# Patient Record
Sex: FEMALE | Race: BLACK OR AFRICAN AMERICAN | NOT HISPANIC OR LATINO | Employment: OTHER | URBAN - METROPOLITAN AREA
[De-identification: names, ages, dates, MRNs, and addresses within clinical notes are randomized per-mention and may not be internally consistent; named-entity substitution may affect disease eponyms.]

---

## 2017-01-09 ENCOUNTER — HOSPITAL ENCOUNTER (EMERGENCY)
Facility: HOSPITAL | Age: 47
Discharge: HOME | End: 2017-01-09
Attending: EMERGENCY MEDICINE
Payer: MEDICARE

## 2017-01-09 VITALS
BODY MASS INDEX: 50.02 KG/M2 | HEIGHT: 64 IN | WEIGHT: 293 LBS | DIASTOLIC BLOOD PRESSURE: 89 MMHG | TEMPERATURE: 98 F | SYSTOLIC BLOOD PRESSURE: 165 MMHG | HEART RATE: 60 BPM | RESPIRATION RATE: 20 BRPM | OXYGEN SATURATION: 93 %

## 2017-01-09 DIAGNOSIS — R51.9 CHRONIC NONINTRACTABLE HEADACHE, UNSPECIFIED HEADACHE TYPE: Primary | ICD-10-CM

## 2017-01-09 DIAGNOSIS — G89.29 CHRONIC NONINTRACTABLE HEADACHE, UNSPECIFIED HEADACHE TYPE: Primary | ICD-10-CM

## 2017-01-09 LAB
ALBUMIN SERPL BCP-MCNC: 3.3 G/DL
ALP SERPL-CCNC: 74 U/L
ALT SERPL W/O P-5'-P-CCNC: 15 U/L
AMORPH CRY URNS QL MICRO: NORMAL
AMPHET+METHAMPHET UR QL: NEGATIVE
ANION GAP SERPL CALC-SCNC: 9 MMOL/L
AST SERPL-CCNC: 12 U/L
B-HCG UR QL: NEGATIVE
BACTERIA #/AREA URNS HPF: NORMAL /HPF
BARBITURATES UR QL SCN>200 NG/ML: NORMAL
BASOPHILS # BLD AUTO: 0.04 K/UL
BASOPHILS NFR BLD: 0.9 %
BENZODIAZ UR QL SCN>200 NG/ML: NEGATIVE
BILIRUB SERPL-MCNC: 0.3 MG/DL
BILIRUB UR QL STRIP: NEGATIVE
BUN SERPL-MCNC: 11 MG/DL
BZE UR QL SCN: NEGATIVE
CALCIUM SERPL-MCNC: 8.8 MG/DL
CANNABINOIDS UR QL SCN: NEGATIVE
CHLORIDE SERPL-SCNC: 110 MMOL/L
CLARITY UR: CLEAR
CO2 SERPL-SCNC: 23 MMOL/L
COLOR UR: YELLOW
CREAT SERPL-MCNC: 1.1 MG/DL
CREAT UR-MCNC: 84.1 MG/DL
CTP QC/QA: YES
DIFFERENTIAL METHOD: ABNORMAL
EOSINOPHIL # BLD AUTO: 0.3 K/UL
EOSINOPHIL NFR BLD: 6.1 %
ERYTHROCYTE [DISTWIDTH] IN BLOOD BY AUTOMATED COUNT: 19.4 %
EST. GFR  (AFRICAN AMERICAN): >60 ML/MIN/1.73 M^2
EST. GFR  (NON AFRICAN AMERICAN): >60 ML/MIN/1.73 M^2
GLUCOSE SERPL-MCNC: 110 MG/DL
GLUCOSE UR QL STRIP: NEGATIVE
HCT VFR BLD AUTO: 35.2 %
HGB BLD-MCNC: 10.5 G/DL
HGB UR QL STRIP: ABNORMAL
KETONES UR QL STRIP: NEGATIVE
LEUKOCYTE ESTERASE UR QL STRIP: NEGATIVE
LYMPHOCYTES # BLD AUTO: 1.7 K/UL
LYMPHOCYTES NFR BLD: 36.9 %
MCH RBC QN AUTO: 23.2 PG
MCHC RBC AUTO-ENTMCNC: 29.8 %
MCV RBC AUTO: 78 FL
METHADONE UR QL SCN>300 NG/ML: NEGATIVE
MICROSCOPIC COMMENT: NORMAL
MONOCYTES # BLD AUTO: 0.6 K/UL
MONOCYTES NFR BLD: 12.2 %
NEUTROPHILS # BLD AUTO: 2 K/UL
NEUTROPHILS NFR BLD: 43.9 %
NITRITE UR QL STRIP: NEGATIVE
OPIATES UR QL SCN: NEGATIVE
PCP UR QL SCN>25 NG/ML: NEGATIVE
PH UR STRIP: 6 [PH] (ref 5–8)
PLATELET # BLD AUTO: 371 K/UL
PMV BLD AUTO: 8.9 FL
POTASSIUM SERPL-SCNC: 3.8 MMOL/L
PROT SERPL-MCNC: 7 G/DL
PROT UR QL STRIP: NEGATIVE
RBC # BLD AUTO: 4.53 M/UL
RBC #/AREA URNS HPF: 0 /HPF (ref 0–4)
SODIUM SERPL-SCNC: 142 MMOL/L
SP GR UR STRIP: 1.01 (ref 1–1.03)
SQUAMOUS #/AREA URNS HPF: 80 /HPF
TOXICOLOGY INFORMATION: NORMAL
URN SPEC COLLECT METH UR: ABNORMAL
UROBILINOGEN UR STRIP-ACNC: NEGATIVE EU/DL
WBC # BLD AUTO: 4.58 K/UL

## 2017-01-09 PROCEDURE — 80053 COMPREHEN METABOLIC PANEL: CPT

## 2017-01-09 PROCEDURE — 96361 HYDRATE IV INFUSION ADD-ON: CPT

## 2017-01-09 PROCEDURE — 96375 TX/PRO/DX INJ NEW DRUG ADDON: CPT

## 2017-01-09 PROCEDURE — 85025 COMPLETE CBC W/AUTO DIFF WBC: CPT

## 2017-01-09 PROCEDURE — 25000003 PHARM REV CODE 250: Performed by: EMERGENCY MEDICINE

## 2017-01-09 PROCEDURE — 82570 ASSAY OF URINE CREATININE: CPT

## 2017-01-09 PROCEDURE — 81025 URINE PREGNANCY TEST: CPT | Performed by: EMERGENCY MEDICINE

## 2017-01-09 PROCEDURE — 63600175 PHARM REV CODE 636 W HCPCS: Performed by: EMERGENCY MEDICINE

## 2017-01-09 PROCEDURE — 96365 THER/PROPH/DIAG IV INF INIT: CPT

## 2017-01-09 PROCEDURE — 81000 URINALYSIS NONAUTO W/SCOPE: CPT

## 2017-01-09 PROCEDURE — 99284 EMERGENCY DEPT VISIT MOD MDM: CPT | Mod: 25

## 2017-01-09 RX ORDER — PROMETHAZINE HYDROCHLORIDE 25 MG/1
25 TABLET ORAL EVERY 6 HOURS PRN
Qty: 15 TABLET | Refills: 0 | Status: SHIPPED | OUTPATIENT
Start: 2017-01-09 | End: 2017-01-11

## 2017-01-09 RX ORDER — HYDROMORPHONE HYDROCHLORIDE 2 MG/ML
1 INJECTION, SOLUTION INTRAMUSCULAR; INTRAVENOUS; SUBCUTANEOUS
Status: COMPLETED | OUTPATIENT
Start: 2017-01-09 | End: 2017-01-09

## 2017-01-09 RX ORDER — PRIMIDONE 50 MG/1
TABLET ORAL
Qty: 90 TABLET | Refills: 0 | Status: SHIPPED | OUTPATIENT
Start: 2017-01-09 | End: 2017-01-17 | Stop reason: SDUPTHER

## 2017-01-09 RX ADMIN — PROMETHAZINE HYDROCHLORIDE 12.5 MG: 25 INJECTION INTRAMUSCULAR; INTRAVENOUS at 10:01

## 2017-01-09 RX ADMIN — HYDROMORPHONE HYDROCHLORIDE 1 MG: 2 INJECTION, SOLUTION INTRAMUSCULAR; INTRAVENOUS; SUBCUTANEOUS at 10:01

## 2017-01-09 RX ADMIN — SODIUM CHLORIDE 1000 ML: 0.9 INJECTION, SOLUTION INTRAVENOUS at 10:01

## 2017-01-09 NOTE — ED AVS SNAPSHOT
[facility]  [address] [place] [address]               Yanni Kaiser   2017  9:37 AM   ED    Description:  Female : 1970   Department:  [facility]           Your Care was Coordinated By:     Provider Role From To    Basilia Lauren MD Attending Provider 17 0937 --      Reason for Visit     Seizures           Diagnoses this Visit        Comments    Chronic nonintractable headache, unspecified headache type    -  Primary       ED Disposition     ED Disposition Condition Comment    Discharge             To Do List           Follow-up Information     Follow up with Darrion Fuentes MD. Schedule an appointment as soon as possible for a visit in 2 days.    Specialty:  Neurology    Contact information:    [address]  [address]  [place] [address]  [phone]         These Medications        Disp Refills Start End    promethazine (PHENERGAN) 25 MG tablet 15 tablet 0 2017     Take 1 tablet (25 mg total) by mouth every 6 (six) hours as needed for Nausea. - Oral    Pharmacy: [facility] [address] [place], [place] [address] AT [facility] Ph #: [phone]         [facility]     [facility] -  Assistance  Registered nurses in the [facility] provide clinical advisement, health education, appointment booking, and other advisory services.  Call for this free service at [phone].             Medications           Message regarding Medications     Verify the changes and/or additions to your medication regime listed below are the same as discussed with your clinician today.  If any of these changes or additions are incorrect, please notify your healthcare provider.        START taking these NEW medications        Refills    promethazine (PHENERGAN) 25 MG tablet 0    Sig: Take 1 tablet (25 mg total) by mouth every 6 (six) hours as needed for Nausea.    Class:  Print    Route: Oral      These medications were administered today        Dose Freq    hydromorphone (PF) injection 1 mg 1 mg ED 1 Time    Sig: Inject 0.5 mLs (1 mg total) into the vein ED 1 Time.    Class: Normal    Route: Intravenous    promethazine (PHENERGAN) 12.5 mg in dextrose 5 % 50 mL IVPB 12.5 mg ED 1 Time    Sig: Inject 12.5 mg into the vein ED 1 Time.    Class: Normal    Route: Intravenous    sodium chloride 0.9% bolus 1,000 mL 1,000 mL ED 1 Time    Sig: Inject 1,000 mLs into the vein ED 1 Time.    Class: Normal    Route: Intravenous           Verify that the below list of medications is an accurate representation of the medications you are currently taking.  If none reported, the list may be blank. If incorrect, please contact your healthcare provider. Carry this list with you in case of emergency.           Current Medications     LINZESS 290 mcg Cap TK ONE CAPSULE PO D ON AN EMPTY STOMACH    metformin (GLUCOPHAGE-XR) 500 MG 24 hr tablet TAKE 1 TABLET BY MOUTH DAILY WITH BREAKFAST    pantoprazole (PROTONIX) 20 MG tablet TK 1 T PO  D    TAZTIA  mg CpSR TK ONE CAPSULE PO D    topiramate (TOPAMAX) 200 MG Tab Take 1 tablet (200 mg total) by mouth 2 (two) times daily.    acetaZOLAMIDE (DIAMOX) 500 mg CpSR Take 500 mg by mouth 2 (two) times daily.    albuterol (PROVENTIL) 2.5 mg /3 mL (0.083 %) nebulizer solution Take 4 mLs (3.3333 mg total) by nebulization every 4 (four) hours as needed for Wheezing or Shortness of Breath.    atorvastatin (LIPITOR) 20 MG tablet Take 1 tablet (20 mg total) by mouth once daily.    butalbital-acetaminophen-caffeine -40 mg (FIORICET, ESGIC) -40 mg per tablet TK 1 T PO Q 8 H PRF PAIN OR HEADACHES    capsaicin 0.1 % Crea Apply 1 application topically 2 (two) times daily.    EPIPEN 2-RHODA 0.3 mg/0.3 mL (1:1,000) AtIn     fluticasone (FLONASE) 50 mcg/actuation nasal spray 1 spray by Each Nare route daily as needed.    gabapentin (NEURONTIN) 300 MG capsule Take 2  "capsules (600 mg total) by mouth 3 (three) times daily.    lamotrigine (LAMICTAL) 25 MG tablet Take 1 tablet (25 mg total) by mouth 2 (two) times daily.    losartan-hydrochlorothiazide 100-25 mg (HYZAAR) 100-25 mg per tablet Take 1 tablet by mouth once daily.    montelukast (SINGULAIR) 10 mg tablet Take by mouth once daily.     norethindrone (AYGESTIN) 5 mg Tab Take 1 tablet (5 mg total) by mouth once daily. TAKE D 1-10 Q MONTH    nystatin (MYCOSTATIN) powder Apply topically 2 (two) times daily.    nystatin-triamcinolone (MYCOLOG) ointment Apply topically 2 (two) times daily.    oxycodone (ROXICODONE) 15 MG Tab Take 1 tablet (15 mg total) by mouth every 6 (six) hours as needed.    pantoprazole (PROTONIX) 40 MG tablet once daily.     polyethylene glycol (COLYTE) 240-22.72-6.72 -5.84 gram SolR MIX AND DRINK UTD    primidone (MYSOLINE) 50 MG Tab Take 1 tablet (50 mg total) by mouth every evening.    primidone (MYSOLINE) 50 MG Tab TAKE 1 TABLET BY MOUTH EVERY EVENING    promethazine (PHENERGAN) 25 MG tablet Take 1 tablet (25 mg total) by mouth every 6 (six) hours as needed for Nausea.    SYMBICORT 160-4.5 mcg/actuation HFAA 2 puffs every 12 (twelve) hours.     venlafaxine (EFFEXOR-XR) 75 MG 24 hr capsule Take 1 capsule (75 mg total) by mouth every evening. Take half tablet           Clinical Reference Information           Your Vitals Were     BP Pulse Temp Resp Height Weight    143/86 52 97.8 °F (36.6 °C) (Oral) 20 5' 4" (1.626 m) 167.8 kg (370 lb)    Last Period SpO2 BMI          10/13/2016 (Approximate) 96% 63.51 kg/m2        Allergies as of 1/9/2017     No Known Allergies      Immunizations Administered on Date of Encounter - 1/9/2017     None      ED Micro, Lab, POCT     Start Ordered       Status Ordering Provider    01/09/17 0941 01/09/17 0940  POCT urine pregnancy  Once      Final result     01/09/17 0941 01/09/17 0940  Urinalysis  STAT      Final result     01/09/17 0941 01/09/17 0940    STAT,   Status:  " Canceled      Canceled     01/09/17 0940 01/09/17 0940  CBC auto differential  STAT      Final result     01/09/17 0940 01/09/17 0940  Comprehensive metabolic panel  STAT      Final result     01/09/17 0940 01/09/17 0940  Drug screen panel, emergency  Once      Final result     01/09/17 0940 01/09/17 0940  Urinalysis Microscopic  Once      Final result       ED Imaging Orders     None        Discharge Instructions         Take medications as directed.  Follow-up with your neurologist.  Return for any new or worsening complaints.  Self-Care for Headaches  Most headaches aren't serious and can be relieved with self-care. But some headaches may be a sign of another health problem like eye trouble or high blood pressure. To find the best treatment, learn what kind of headaches you get. For tension headaches, self-care will usually help. To treat migraines, ask your healthcare provider for advice. It is also possible to get both tension and migraine headaches. Self-care involves relieving the pain and avoiding headache triggers if you can.    Ways to reduce pain and tension  Try these steps:  · Apply a cold compress or ice pack to the pain site.  · Drink fluids. If nausea makes it hard to drink, try sucking on ice.  · Rest. Protect yourself from bright light and loud noises.  · Calm your emotions by imagining a peaceful scene.  · Massage tight neck, shoulder, and head muscles.  · To relax muscles, soak in a hot bath or use a hot shower.  Use medicines  Aspirin or aspirin substitutes, such as ibuprofen and acetaminophen, can relieve headache. Remember: Never give aspirin to anyone 18 years old or younger because of the risk of developing Reye syndrome. Use pain medicines only when necessary.  Track your headaches  Keeping a headache diary can help you and your healthcare provider identify what's causing your headaches:  · Note when each headache happens.  · Identify your activities and the foods you've eaten 6 to  "8 hours before the headache began.  · Look for any trends or "triggers."  Signs of tension headache  Any of the following can be signs:  · Dull pain or feeling of pressure in a tight band around your head  · Pain in your neck or shoulders  · Headache without a definite beginning or end  · Headache after an activity such as driving or working on a computer  Signs of migraine  Any of the following can be signs:  · Throbbing pain on one or both sides of your head  · Nausea or vomiting  · Extreme sensitivity to light, sound, and smells  · Bright spots, flashes, or other visual changes  · Pain or nausea so severe that you can't continue your daily activities  Call your healthcare provider   If you have any of the following symptoms, contact your healthcare provider:  · A headache that lingers after a recent injury or bump to the head.  · A fever with a stiff neck or pain when you bend your head toward your chest.  · A headache along with slurred speech, changes in your vision, or numbness or weakness in your arms or legs.  · A headache for longer than 3 days.  · Frequent headaches, especially in the morning.  · Headaches with seizures   · Seek immediate medical attention if you have a headache that you would call "the worst headache you have ever had."   © 3019-4289 [employer]. [address] [place] [address], [place] [address]. All rights reserved. This information is not intended as a substitute for professional medical care. Always follow your healthcare professional's instructions.          Your Scheduled Appointments     Mar 09, 2017  1:30 PM CST   Established Patient Visit with Rosalva Taylor DPM   [facility] ([place])    [address] [place], [place] [address] [place] [address] [phone]              [facility] Sign-Up     Activating your [facility] account is as easy as 1-2-3!     1) Visit [email or web address], select Sign Up Now, enter this activation code and your date of birth, then select " Next.  [email or web address]  Expires: 2/23/2017  1:23 PM      2) Create a username and password to use when you visit [facility] in the future and select a security question in case you lose your password and select Next.    3) Enter your e-mail address and click Sign Up!    Additional Information  If you have questions, please e-mail [email or web address] or call [phone] to talk to our [facility] staff. Remember, [facility] is NOT to be used for urgent needs. For medical emergencies, dial 911.         Smoking Cessation     If you would like to quit smoking:   You may be eligible for free services if you are a [place] resident and started smoking cigarettes before September 1, 1988.  Call the Smoking Cessation Trust (SCT) toll free at (774) 839-0990 or (555) 634-9079.   Call 4-440-QUIT-NOW if you do not meet the above criteria.             [facility] complies with applicable Federal civil rights laws and does not discriminate on the basis of race, color, national origin, age, disability, or sex.        Language Assistance Services     ATTENTION: Language assistance services are available, free of charge. Please call [phone].      ATENCIÓN: Si habla osmanellen, tiene a lee disposición servicios gratuitos de asistencia lingüística. Llame al [phone].     CHÚ Ý: N?u b?n nói Ti?ng Vi?t, có các d?ch v? h? tr? ngôn ng? mi?n phí dành cho b?n. G?i s? [phone].

## 2017-01-09 NOTE — DISCHARGE INSTRUCTIONS
"  Take medications as directed.  Follow-up with your neurologist.  Return for any new or worsening complaints.  Self-Care for Headaches  Most headaches aren't serious and can be relieved with self-care. But some headaches may be a sign of another health problem like eye trouble or high blood pressure. To find the best treatment, learn what kind of headaches you get. For tension headaches, self-care will usually help. To treat migraines, ask your healthcare provider for advice. It is also possible to get both tension and migraine headaches. Self-care involves relieving the pain and avoiding headache triggers if you can.    Ways to reduce pain and tension  Try these steps:  · Apply a cold compress or ice pack to the pain site.  · Drink fluids. If nausea makes it hard to drink, try sucking on ice.  · Rest. Protect yourself from bright light and loud noises.  · Calm your emotions by imagining a peaceful scene.  · Massage tight neck, shoulder, and head muscles.  · To relax muscles, soak in a hot bath or use a hot shower.  Use medicines  Aspirin or aspirin substitutes, such as ibuprofen and acetaminophen, can relieve headache. Remember: Never give aspirin to anyone 18 years old or younger because of the risk of developing Reye syndrome. Use pain medicines only when necessary.  Track your headaches  Keeping a headache diary can help you and your healthcare provider identify what's causing your headaches:  · Note when each headache happens.  · Identify your activities and the foods you've eaten 6 to 8 hours before the headache began.  · Look for any trends or "triggers."  Signs of tension headache  Any of the following can be signs:  · Dull pain or feeling of pressure in a tight band around your head  · Pain in your neck or shoulders  · Headache without a definite beginning or end  · Headache after an activity such as driving or working on a computer  Signs of migraine  Any of the following can be signs:  · Throbbing pain " "on one or both sides of your head  · Nausea or vomiting  · Extreme sensitivity to light, sound, and smells  · Bright spots, flashes, or other visual changes  · Pain or nausea so severe that you can't continue your daily activities  Call your healthcare provider   If you have any of the following symptoms, contact your healthcare provider:  · A headache that lingers after a recent injury or bump to the head.  · A fever with a stiff neck or pain when you bend your head toward your chest.  · A headache along with slurred speech, changes in your vision, or numbness or weakness in your arms or legs.  · A headache for longer than 3 days.  · Frequent headaches, especially in the morning.  · Headaches with seizures   · Seek immediate medical attention if you have a headache that you would call "the worst headache you have ever had."   © 3197-5836 [employer]. [address] [place] [address], [place] [address]. All rights reserved. This information is not intended as a substitute for professional medical care. Always follow your healthcare professional's instructions.        "

## 2017-01-09 NOTE — ED NOTES
Pt sleeping. Pt arouses and states she is still nauseated and rates pain 10/10 when asked. Pt then immediately falls back asleep. Will continue to monitor. Call light within reach.

## 2017-01-09 NOTE — ED TRIAGE NOTES
"Pt. With PMH of brain lesion presents with headache that began last night. Pt. Reports she takes Diamox and Topamax. Pt. Reports nausea and left side of body "feels numb". Pt. States, 'I have constant pain behind my left eye and I am supposed to see a neurologist soon". Pt. Reports the last episode of seizures was the day before yesterday. No trauma reported from seizure.   "

## 2017-01-09 NOTE — ED PROVIDER NOTES
Encounter Date: 1/9/2017    SCRIBE #1 NOTE: I, Mica Collins, am scribing for, and in the presence of,  Basilia Lauren MD. I have scribed the following portions of the note - Other sections scribed: HPI/ROS.       History     Chief Complaint   Patient presents with    Seizures     arrived via [facility],called to home for c/o witness seizure activity by , hx of seizure, c/o HA since last night, seizure while sitting on chair, no fall      Review of patient's allergies indicates:  No Known Allergies  HPI Comments: CC: Seizures    HPI: 46 y.o. F with idiopathic intracranial HTN, NID-DM, seizures, migraine HA, and HTN presents to the ED via EMS. Pt is c/o a constant, severe HA with intermittent L eye pain, nausea, and intermittent L-sided weakness since last night. Pt states symptoms align with her usual migraine HA. She reports daily migraine HA. Today's HA is not new or worse from baseline.  Pt's last seizure was 3 days ago. She reports both absence and tonic-clonic seizures. No fall. Pt's reports irregular menstrual cycles at baseline. Pt is scheduled for an LP under fluoroscopy soon and has f/u with opthalmology soon for the above. Also to f/u with NS for possible shunt placement. No prior tx today. Pt denies fever, chills, CP, SOB, and emesis. Her  is at bedside and is also checking in as a patient for different complaint    The history is provided by the patient and the EMS personnel.     Past Medical History   Diagnosis Date    Allergy     Asthma     Diabetes mellitus     Diabetes mellitus, type 2     GERD (gastroesophageal reflux disease)     Hypertension     Seizures     Sleep apnea      No past medical history pertinent negatives.  Past Surgical History   Procedure Laterality Date    Cholecystectomy      Sinus surgery      Gallbladder surgery       Family History   Problem Relation Age of Onset    Cancer Mother     Hypertension Mother     Diabetes Mother     Stroke Father     Heart  disease Father     COPD Father     Heart attack Father     Cancer Maternal Grandmother      Social History   Substance Use Topics    Smoking status: Current Every Day Smoker     Packs/day: 1.00     Years: 15.00     Types: Cigarettes     Last attempt to quit: 6/10/2015    Smokeless tobacco: Never Used    Alcohol use No     Review of Systems   Constitutional: Negative for activity change, appetite change, chills and fever.   HENT: Negative for congestion, dental problem, drooling, facial swelling, postnasal drip, rhinorrhea, sinus pressure, sore throat and trouble swallowing.    Eyes: Positive for pain (intermittent L eye pain, chronic). Negative for redness and visual disturbance.   Respiratory: Negative for apnea, cough, chest tightness and shortness of breath.    Cardiovascular: Negative for chest pain and palpitations.   Gastrointestinal: Positive for nausea. Negative for abdominal distention, abdominal pain, anal bleeding, constipation, diarrhea and vomiting.   Endocrine: Negative for polydipsia.   Genitourinary: Negative for difficulty urinating, dysuria, flank pain, frequency and hematuria.   Musculoskeletal: Negative for arthralgias, back pain, gait problem, joint swelling, myalgias, neck pain and neck stiffness.   Skin: Negative for color change, rash and wound.   Neurological: Positive for seizures and headaches. Negative for dizziness, tremors, syncope, weakness, light-headedness and numbness.   Hematological: Does not bruise/bleed easily.   Psychiatric/Behavioral: Negative for agitation, behavioral problems, confusion and decreased concentration.       Physical Exam   Initial Vitals   BP Pulse Resp Temp SpO2   01/09/17 0938 01/09/17 0938 01/09/17 0938 01/09/17 0938 01/09/17 0938   138/83 71 20 97.8 °F (36.6 °C) 99 %     Physical Exam    Nursing note and vitals reviewed.  Constitutional: She appears well-developed and well-nourished. She is not diaphoretic. No distress.   HENT:   Head: Normocephalic  and atraumatic.   Right Ear: External ear normal.   Left Ear: External ear normal.   Nose: Nose normal.   Mouth/Throat: Oropharynx is clear and moist. No oropharyngeal exudate.   Unable to identify any papilledema on exam   Eyes: Conjunctivae and EOM are normal. Pupils are equal, round, and reactive to light. Right eye exhibits no discharge. Left eye exhibits no discharge. No scleral icterus.   Neck: Normal range of motion. Neck supple. No thyromegaly present. No tracheal deviation present. No JVD present.   Cardiovascular: Normal rate, regular rhythm, normal heart sounds and intact distal pulses. Exam reveals no gallop and no friction rub.    No murmur heard.  Pulmonary/Chest: Breath sounds normal. No stridor. No respiratory distress. She has no wheezes. She has no rhonchi. She has no rales.   Abdominal: Soft. Bowel sounds are normal. She exhibits no distension. There is no tenderness. There is no rebound and no guarding.   Musculoskeletal: Normal range of motion. She exhibits no edema or tenderness.   Neurological: She is alert and oriented to person, place, and time. She has normal strength. No cranial nerve deficit or sensory deficit.   Skin: Skin is warm and dry. No erythema. No pallor.   Psychiatric: She has a normal mood and affect. Her behavior is normal. Thought content normal.         ED Course   Procedures  Labs Reviewed   CBC W/ AUTO DIFFERENTIAL - Abnormal; Notable for the following:        Result Value    Hemoglobin 10.5 (*)     Hematocrit 35.2 (*)     MCV 78 (*)     MCH 23.2 (*)     MCHC 29.8 (*)     RDW 19.4 (*)     Platelets 371 (*)     MPV 8.9 (*)     All other components within normal limits   COMPREHENSIVE METABOLIC PANEL - Abnormal; Notable for the following:     Albumin 3.3 (*)     All other components within normal limits   URINALYSIS - Abnormal; Notable for the following:     Occult Blood UA 1+ (*)     All other components within normal limits   DRUG SCREEN PANEL, URINE EMERGENCY   URINALYSIS  MICROSCOPIC   POCT URINE PREGNANCY             Medical Decision Makinyo F with multiple medical problems presents c/o HA and eye pain consistent with chronic symptoms. Last seizure three days ago per patient.  at bedside is unable to provide additional details and is sitting in a chair, occasionally speaking but mostly moaning. DDx includes but not limited to migraine HA, tension HA, benign ICH, dehydration, pregnancy, URI, seasonal allergies. I have considered but doubt sepsis, ICH, skull fx, CVA, meningitis. Labs reviewed. She is UPT negative. GIven single dose of pain medication and anti-emetics and she is asleep. Pain resolved. Has f/u with IR, neurology, NS and eye clinic. No indication for emergent LP, imaging or admission. Return precautions given. She agrees with plan.            Scribe Attestation:   Scribe #1: I performed the above scribed service and the documentation accurately describes the services I performed. I attest to the accuracy of the note.    Attending Attestation:           Physician Attestation for Scribe:  Physician Attestation Statement for Scribe #1: I, Basilia Lauren MD, reviewed documentation, as scribed by Mica Collins in my presence, and it is both accurate and complete.                 ED Course     Clinical Impression:   The encounter diagnosis was Chronic nonintractable headache, unspecified headache type.          Basilia Lauren MD  [phone]

## 2017-01-10 ENCOUNTER — TELEPHONE (OUTPATIENT)
Dept: NEUROLOGY | Facility: CLINIC | Age: 47
End: 2017-01-10

## 2017-01-11 ENCOUNTER — OFFICE VISIT (OUTPATIENT)
Dept: FAMILY MEDICINE | Facility: CLINIC | Age: 47
End: 2017-01-11
Payer: MEDICARE

## 2017-01-11 ENCOUNTER — LAB VISIT (OUTPATIENT)
Dept: LAB | Facility: HOSPITAL | Age: 47
End: 2017-01-11
Attending: FAMILY MEDICINE
Payer: MEDICARE

## 2017-01-11 VITALS
DIASTOLIC BLOOD PRESSURE: 88 MMHG | SYSTOLIC BLOOD PRESSURE: 126 MMHG | BODY MASS INDEX: 50.02 KG/M2 | TEMPERATURE: 99 F | WEIGHT: 293 LBS | OXYGEN SATURATION: 97 % | HEART RATE: 69 BPM | HEIGHT: 64 IN

## 2017-01-11 DIAGNOSIS — G93.2 PSEUDOTUMOR CEREBRI: Primary | Chronic | ICD-10-CM

## 2017-01-11 DIAGNOSIS — E11.69 COMBINED HYPERLIPIDEMIA ASSOCIATED WITH TYPE 2 DIABETES MELLITUS: ICD-10-CM

## 2017-01-11 DIAGNOSIS — E78.2 COMBINED HYPERLIPIDEMIA ASSOCIATED WITH TYPE 2 DIABETES MELLITUS: ICD-10-CM

## 2017-01-11 DIAGNOSIS — E11.9 CONTROLLED TYPE 2 DIABETES MELLITUS WITHOUT COMPLICATION, WITHOUT LONG-TERM CURRENT USE OF INSULIN: Chronic | ICD-10-CM

## 2017-01-11 DIAGNOSIS — M72.2 PLANTAR FASCIITIS OF RIGHT FOOT: ICD-10-CM

## 2017-01-11 DIAGNOSIS — M79.676 GREAT TOE PAIN, UNSPECIFIED LATERALITY: ICD-10-CM

## 2017-01-11 DIAGNOSIS — I10 ESSENTIAL HYPERTENSION: Chronic | ICD-10-CM

## 2017-01-11 LAB — URATE SERPL-MCNC: 5.7 MG/DL

## 2017-01-11 PROCEDURE — 3044F HG A1C LEVEL LT 7.0%: CPT | Mod: S$GLB,,, | Performed by: FAMILY MEDICINE

## 2017-01-11 PROCEDURE — 84550 ASSAY OF BLOOD/URIC ACID: CPT

## 2017-01-11 PROCEDURE — 83036 HEMOGLOBIN GLYCOSYLATED A1C: CPT

## 2017-01-11 PROCEDURE — 99999 PR PBB SHADOW E&M-EST. PATIENT-LVL III: CPT | Mod: PBBFAC,,, | Performed by: FAMILY MEDICINE

## 2017-01-11 PROCEDURE — 99214 OFFICE O/P EST MOD 30 MIN: CPT | Mod: S$GLB,,, | Performed by: FAMILY MEDICINE

## 2017-01-11 PROCEDURE — 4010F ACE/ARB THERAPY RXD/TAKEN: CPT | Mod: S$GLB,,, | Performed by: FAMILY MEDICINE

## 2017-01-11 PROCEDURE — 99499 UNLISTED E&M SERVICE: CPT | Mod: S$GLB,,, | Performed by: FAMILY MEDICINE

## 2017-01-11 PROCEDURE — 3079F DIAST BP 80-89 MM HG: CPT | Mod: S$GLB,,, | Performed by: FAMILY MEDICINE

## 2017-01-11 PROCEDURE — 1159F MED LIST DOCD IN RCRD: CPT | Mod: S$GLB,,, | Performed by: FAMILY MEDICINE

## 2017-01-11 PROCEDURE — 3074F SYST BP LT 130 MM HG: CPT | Mod: S$GLB,,, | Performed by: FAMILY MEDICINE

## 2017-01-11 PROCEDURE — 36415 COLL VENOUS BLD VENIPUNCTURE: CPT

## 2017-01-11 RX ORDER — ATORVASTATIN CALCIUM 20 MG/1
20 TABLET, FILM COATED ORAL DAILY
Qty: 90 TABLET | Refills: 1 | Status: SHIPPED | OUTPATIENT
Start: 2017-01-11 | End: 2018-01-22

## 2017-01-11 RX ORDER — METFORMIN HYDROCHLORIDE 500 MG/1
500 TABLET, EXTENDED RELEASE ORAL DAILY
Qty: 90 TABLET | Refills: 1 | Status: SHIPPED | OUTPATIENT
Start: 2017-01-11 | End: 2017-02-13

## 2017-01-11 RX ORDER — OXYCODONE HYDROCHLORIDE 15 MG/1
15 TABLET ORAL EVERY 6 HOURS PRN
Qty: 40 TABLET | Refills: 0 | Status: SHIPPED | OUTPATIENT
Start: 2017-01-11 | End: 2017-01-30 | Stop reason: SDUPTHER

## 2017-01-11 NOTE — PROGRESS NOTES
Routine Office Visit    Patient Name: Yanni Kaiser    : 1970  MRN: [id number]    Subjective:  Yanni is a 46 y.o. female who presents today for:    1. Headache  Patient presenting today with continuous headache that is worsened with light and sound.  She has been given fioricet in the past for migraines, but states it is not helping.  She has also been taking extra strength tylenol for headaches and that is not helping as well.  She has been diagnosed with migraines and pseudotumor cerebri.  She has already had to lumbar punctures to relieve pressure, but reports symptoms improving for only a few days.  She states that she is suppose to be referred to neurosurgery for possible shunt placement, but that her neurologist also wanted her to see ophthalmology and interventional radiology.  She denies any numbness, tingling, weakness, or changes in speech.  Patient does have epilepsy and states that her last seizure was a couple weeks ago.  She takes her medications as prescribed.  Her  reports that most of her seizures are while she sleeps.    2.  Right foot pain  Patient has been diagnosed with right plantar fasciitis in the past by podiatry.  Her podiatrist is currently on maternity leave, but patient wanted some pain medication to help with this until she can see her again.  She was offered injections, but patient declined.  She states that the pain gets severe at times.      Past Medical History  Past Medical History   Diagnosis Date    Allergy     Asthma     Diabetes mellitus     Diabetes mellitus, type 2     GERD (gastroesophageal reflux disease)     Hypertension     Seizures     Sleep apnea        Past Surgical History  Past Surgical History   Procedure Laterality Date    Cholecystectomy      Sinus surgery      Gallbladder surgery         Family History  Family History   Problem Relation Age of Onset    Cancer Mother     Hypertension Mother     Diabetes Mother     Stroke Father      Heart disease Father     COPD Father     Heart attack Father     Cancer Maternal Grandmother        Social History  Social History     Social History    Marital status:      Spouse name: N/A    Number of children: N/A    Years of education: N/A     Occupational History    Not on file.     Social History Main Topics    Smoking status: Current Every Day Smoker     Packs/day: 1.00     Years: 15.00     Types: Cigarettes     Last attempt to quit: 6/10/2015    Smokeless tobacco: Never Used    Alcohol use No    Drug use: No    Sexual activity: Yes     Partners: Male     Birth control/ protection: None     Other Topics Concern    Not on file     Social History Narrative       Current Medications  Current Outpatient Prescriptions on File Prior to Visit   Medication Sig Dispense Refill    acetaZOLAMIDE (DIAMOX) 500 mg CpSR Take 500 mg by mouth 2 (two) times daily.      butalbital-acetaminophen-caffeine -40 mg (FIORICET, ESGIC) -40 mg per tablet TK 1 T PO Q 8 H PRF PAIN OR HEADACHES  2    fluticasone (FLONASE) 50 mcg/actuation nasal spray 1 spray by Each Nare route daily as needed. 16 g 1    gabapentin (NEURONTIN) 300 MG capsule Take 2 capsules (600 mg total) by mouth 3 (three) times daily. 180 capsule 11    lamotrigine (LAMICTAL) 25 MG tablet Take 1 tablet (25 mg total) by mouth 2 (two) times daily. 60 tablet 11    LINZESS 290 mcg Cap TK ONE CAPSULE PO D ON AN EMPTY STOMACH  5    losartan-hydrochlorothiazide 100-25 mg (HYZAAR) 100-25 mg per tablet Take 1 tablet by mouth once daily. 90 tablet 4    montelukast (SINGULAIR) 10 mg tablet Take by mouth once daily.   5    pantoprazole (PROTONIX) 40 MG tablet once daily.   0    primidone (MYSOLINE) 50 MG Tab Take 1 tablet (50 mg total) by mouth every evening. 90 tablet 3    primidone (MYSOLINE) 50 MG Tab TAKE 1 TABLET BY MOUTH EVERY EVENING 90 tablet 0    SYMBICORT 160-4.5 mcg/actuation HFAA 2 puffs every 12 (twelve) hours.   2     TAZTIA  mg CpSR TK ONE CAPSULE PO D  2    topiramate (TOPAMAX) 200 MG Tab Take 1 tablet (200 mg total) by mouth 2 (two) times daily. 60 tablet 11    venlafaxine (EFFEXOR-XR) 75 MG 24 hr capsule Take 1 capsule (75 mg total) by mouth every evening. Take half tablet 90 capsule 3    [DISCONTINUED] atorvastatin (LIPITOR) 20 MG tablet Take 1 tablet (20 mg total) by mouth once daily. 90 tablet 1    [DISCONTINUED] oxycodone (ROXICODONE) 15 MG Tab Take 1 tablet (15 mg total) by mouth every 6 (six) hours as needed. 40 tablet 0    [DISCONTINUED] pantoprazole (PROTONIX) 20 MG tablet TK 1 T PO  D  3    albuterol (PROVENTIL) 2.5 mg /3 mL (0.083 %) nebulizer solution Take 4 mLs (3.3333 mg total) by nebulization every 4 (four) hours as needed for Wheezing or Shortness of Breath.  0    capsaicin 0.1 % Crea Apply 1 application topically 2 (two) times daily. 56.6 g 1    EPIPEN 2-RHODA 0.3 mg/0.3 mL (1:1,000) AtIn   1    norethindrone (AYGESTIN) 5 mg Tab Take 1 tablet (5 mg total) by mouth once daily. TAKE D 1-10 Q MONTH 10 tablet 12    nystatin-triamcinolone (MYCOLOG) ointment Apply topically 2 (two) times daily. 60 g 2    [DISCONTINUED] metformin (GLUCOPHAGE-XR) 500 MG 24 hr tablet TAKE 1 TABLET BY MOUTH DAILY WITH BREAKFAST 90 tablet 1    [DISCONTINUED] nystatin (MYCOSTATIN) powder Apply topically 2 (two) times daily. 60 g 3    [DISCONTINUED] polyethylene glycol (COLYTE) 240-22.72-6.72 -5.84 gram SolR MIX AND DRINK UTD  0    [DISCONTINUED] promethazine (PHENERGAN) 25 MG tablet Take 1 tablet (25 mg total) by mouth every 6 (six) hours as needed for Nausea. 15 tablet 0     No current facility-administered medications on file prior to visit.        Allergies   Review of patient's allergies indicates:  No Known Allergies    Review of Systems (Pertinent positives)  Review of Systems   Constitutional: Negative.    Eyes: Positive for photophobia.   Respiratory: Negative.    Cardiovascular: Negative.    Gastrointestinal:  "Negative.    Genitourinary: Negative.    Skin: Positive for itching and rash.   Neurological: Positive for headaches.         Visit Vitals    /88 (BP Location: Right arm, Patient Position: Sitting, BP Method: Manual)    Pulse 69    Temp 98.8 °F (37.1 °C) (Oral)    Ht 5' 4" (1.626 m)    Wt (!) 167.7 kg (369 lb 11.4 oz)    LMP 10/13/2016 (Approximate)    SpO2 97%    BMI 63.46 kg/m2       GENERAL APPEARANCE: in no apparent distress and well developed and well nourished  HEENT: PERRL, EOMI, Sclera clear, anicteric, Oropharynx clear, no lesions, Neck supple with midline trachea  NECK: normal, supple, no adenopathy, thyroid normal in size  RESPIRATORY: appears well, vitals normal, no respiratory distress, acyanotic, normal RR, chest clear, no wheezing, crepitations, rhonchi, normal symmetric air entry  HEART: regular rate and rhythm, S1, S2 normal, no murmur, click, rub or gallop.    ABDOMEN: abdomen is soft without tenderness, no masses, no hernias, no organomegaly, no rebound, no guarding. Suprapubic tenderness absent. No CVA tenderness.  NEUROLOGIC: normal without focal findings, CN II-XII are intact.    SKIN: no rashes, no wounds, no other lesions  PSYCH: Alert, oriented x 3, thought content appropriate, speech normal, pleasant and cooperative, good eye contact, well groomed    Assessment/Plan:  Yanni Kaiser is a 46 y.o. female who presents today for :    Yanni was seen today for establish care and headache.    Diagnoses and all orders for this visit:    Pseudotumor cerebri  -     oxycodone (ROXICODONE) 15 MG Tab; Take 1 tablet (15 mg total) by mouth every 6 (six) hours as needed.  -     Ambulatory referral to Neurology    Combined hyperlipidemia associated with type 2 diabetes mellitus  -     atorvastatin (LIPITOR) 20 MG tablet; Take 1 tablet (20 mg total) by mouth once daily.    Essential hypertension    Controlled type 2 diabetes mellitus without complication, without long-term current use " of insulin  -     metformin (GLUCOPHAGE-XR) 500 MG 24 hr tablet; Take 1 tablet (500 mg total) by mouth once daily.  -     Hemoglobin A1c; Future    Great toe pain, unspecified laterality  -     Uric acid; Future    Plantar fasciitis of right foot  -     oxycodone (ROXICODONE) 15 MG Tab; Take 1 tablet (15 mg total) by mouth every 6 (six) hours as needed.    Body mass index 60.0-69.9, adult      1.  Patient to take medications as prescribed  2.  Last a1c showed diabetes to be stable  3.  Will check uric acid and a1c today  4.  Refilled medications  5.  Referred to second neurologist for second opinion  6.  Headaches could be secondary to increased pressure from pseudotumor  7.  Blood pressure currently controlled  8.  Patient should be monitored closely for seizure activity    Carlyle Gordillo MD

## 2017-01-11 NOTE — MR AVS SNAPSHOT
[facility]  [address]  [place] [address]  Phone: [phone]                  Yanni Kaiser   2017 10:15 AM   Office Visit    Description:  Female : 1970   Provider:  Carlyle Gordillo MD   Department:  [facility]           Reason for Visit     Establish Care     Headache           Diagnoses this Visit        Comments    Pseudotumor cerebri    -  Primary     Combined hyperlipidemia associated with type 2 diabetes mellitus         Essential hypertension         Controlled type 2 diabetes mellitus without complication, without long-term current use of insulin                To Do List           Future Appointments        Provider Department Dept Phone    3/9/2017 1:30 PM Rosalva Taylor DPM [facility] - Podiatry [phone]      Goals (5 Years of Data)     None      Follow-Up and Disposition     Return in about 3 months (around 2017).       These Medications        Disp Refills Start End    atorvastatin (LIPITOR) 20 MG tablet 90 tablet 1 2017    Take 1 tablet (20 mg total) by mouth once daily. - Oral    Pharmacy: [facility] [address] AT [facility] Ph #: [phone]       Notes to Pharmacy: Discontinue 30 day supply    metformin (GLUCOPHAGE-XR) 500 MG 24 hr tablet 90 tablet 1 2017     Take 1 tablet (500 mg total) by mouth once daily. - Oral    Pharmacy: [facility] [address] AT [facility] Ph #: [phone]       oxycodone (ROXICODONE) 15 MG Tab 40 tablet 0 2017     Take 1 tablet (15 mg total) by mouth every 6 (six) hours as needed. - Oral    Pharmacy: [facility] [address] AT [facility] Ph #: [phone]         [facility] On Call     [facility] On Call Nurse Care Line -  Assistance  Registered nurses in the [facility] On Call Center provide clinical advisement,  health education, appointment booking, and other advisory services.  Call for this free service at [phone].             Medications           Message regarding Medications     Verify the changes and/or additions to your medication regime listed below are the same as discussed with your clinician today.  If any of these changes or additions are incorrect, please notify your healthcare provider.        CHANGE how you are taking these medications     Start Taking Instead of    metformin (GLUCOPHAGE-XR) 500 MG 24 hr tablet metformin (GLUCOPHAGE-XR) 500 MG 24 hr tablet    Dosage:  Take 1 tablet (500 mg total) by mouth once daily. Dosage:  TAKE 1 TABLET BY MOUTH DAILY WITH BREAKFAST    Reason for Change:  Reorder       STOP taking these medications     nystatin (MYCOSTATIN) powder Apply topically 2 (two) times daily.    promethazine (PHENERGAN) 25 MG tablet Take 1 tablet (25 mg total) by mouth every 6 (six) hours as needed for Nausea.    polyethylene glycol (COLYTE) 240-22.72-6.72 -5.84 gram SolR MIX AND DRINK UTD           Verify that the below list of medications is an accurate representation of the medications you are currently taking.  If none reported, the list may be blank. If incorrect, please contact your healthcare provider. Carry this list with you in case of emergency.           Current Medications     acetaZOLAMIDE (DIAMOX) 500 mg CpSR Take 500 mg by mouth 2 (two) times daily.    atorvastatin (LIPITOR) 20 MG tablet Take 1 tablet (20 mg total) by mouth once daily.    butalbital-acetaminophen-caffeine -40 mg (FIORICET, ESGIC) -40 mg per tablet TK 1 T PO Q 8 H PRF PAIN OR HEADACHES    fluticasone (FLONASE) 50 mcg/actuation nasal spray 1 spray by Each Nare route daily as needed.    FLUTICASONE/SALMETEROL (ADVAIR DISKUS INHL) Inhale into the lungs.    gabapentin (NEURONTIN) 300 MG capsule Take 2 capsules (600 mg total) by mouth 3 (three) times daily.    lamotrigine (LAMICTAL) 25 MG tablet Take 1  "tablet (25 mg total) by mouth 2 (two) times daily.    LINZESS 290 mcg Cap TK ONE CAPSULE PO D ON AN EMPTY STOMACH    losartan-hydrochlorothiazide 100-25 mg (HYZAAR) 100-25 mg per tablet Take 1 tablet by mouth once daily.    montelukast (SINGULAIR) 10 mg tablet Take by mouth once daily.     oxycodone (ROXICODONE) 15 MG Tab Take 1 tablet (15 mg total) by mouth every 6 (six) hours as needed.    pantoprazole (PROTONIX) 40 MG tablet once daily.     primidone (MYSOLINE) 50 MG Tab Take 1 tablet (50 mg total) by mouth every evening.    primidone (MYSOLINE) 50 MG Tab TAKE 1 TABLET BY MOUTH EVERY EVENING    SYMBICORT 160-4.5 mcg/actuation HFAA 2 puffs every 12 (twelve) hours.     TAZTIA  mg CpSR TK ONE CAPSULE PO D    topiramate (TOPAMAX) 200 MG Tab Take 1 tablet (200 mg total) by mouth 2 (two) times daily.    venlafaxine (EFFEXOR-XR) 75 MG 24 hr capsule Take 1 capsule (75 mg total) by mouth every evening. Take half tablet    albuterol (PROVENTIL) 2.5 mg /3 mL (0.083 %) nebulizer solution Take 4 mLs (3.3333 mg total) by nebulization every 4 (four) hours as needed for Wheezing or Shortness of Breath.    capsaicin 0.1 % Crea Apply 1 application topically 2 (two) times daily.    EPIPEN 2-RHODA 0.3 mg/0.3 mL (1:1,000) AtIn     metformin (GLUCOPHAGE-XR) 500 MG 24 hr tablet Take 1 tablet (500 mg total) by mouth once daily.    norethindrone (AYGESTIN) 5 mg Tab Take 1 tablet (5 mg total) by mouth once daily. TAKE D 1-10 Q MONTH    nystatin-triamcinolone (MYCOLOG) ointment Apply topically 2 (two) times daily.           Clinical Reference Information           Vital Signs - Last Recorded  Most recent update: 1/11/2017 10:23 AM by Kathleen Dhaliwal MA    BP Pulse Temp Ht    126/88 (BP Location: Right arm, Patient Position: Sitting, BP Method: Manual) 69 98.8 °F (37.1 °C) (Oral) 5' 4" (1.626 m)    Wt LMP SpO2 BMI    (!) 167.7 kg (369 lb 11.4 oz) 10/13/2016 (Approximate) 97% 63.46 kg/m2      Blood Pressure          Most Recent " Value    BP  126/88      Allergies as of 1/11/2017     No Known Allergies      Immunizations Administered on Date of Encounter - 1/11/2017     None      [facility] Sign-Up     Activating your [facility] account is as easy as 1-2-3!     1) Visit [email or web address], select Sign Up Now, enter this activation code and your date of birth, then select Next.  [email or web address]  Expires: 2/23/2017  1:23 PM      2) Create a username and password to use when you visit [facility] in the future and select a security question in case you lose your password and select Next.    3) Enter your e-mail address and click Sign Up!    Additional Information  If you have questions, please e-mail [email or web address] or call [phone] to talk to our [facility] staff. Remember, [facility] is NOT to be used for urgent needs. For medical emergencies, dial 911.         Smoking Cessation     If you would like to quit smoking:   You may be eligible for free services if you are a [place] resident and started smoking cigarettes before September 1, 1988.  Call the [employer]oking [employer]essation [employer] (S[employer]) toll free at (062) 878-4905 or (393) 095-7244.   Call 7-800-QUIT-NOW if you do not meet the above criteria.

## 2017-01-12 ENCOUNTER — TELEPHONE (OUTPATIENT)
Dept: NEUROLOGY | Facility: CLINIC | Age: 47
End: 2017-01-12

## 2017-01-12 LAB
ESTIMATED AVG GLUCOSE: 146 MG/DL
HBA1C MFR BLD HPLC: 6.7 %

## 2017-01-12 NOTE — TELEPHONE ENCOUNTER
----- Message from Sindhu Aceves sent at 1/12/2017  9:21 AM CST -----  Contact: self  Patient states need to speak with nurse to schedule procedure.   Please call pt for more detail info [phone]

## 2017-01-12 NOTE — TELEPHONE ENCOUNTER
Called Interventional Radiology spoke with May to schedule patient for LP. She will call patient to schedule.

## 2017-01-12 NOTE — TELEPHONE ENCOUNTER
Returned patient call, ESTRELLA to please call clinic and leave message with a good time to call her back today.

## 2017-01-13 ENCOUNTER — TELEPHONE (OUTPATIENT)
Dept: FAMILY MEDICINE | Facility: CLINIC | Age: 47
End: 2017-01-13

## 2017-01-13 ENCOUNTER — CLINICAL SUPPORT (OUTPATIENT)
Dept: OPHTHALMOLOGY | Facility: CLINIC | Age: 47
End: 2017-01-13
Payer: MEDICARE

## 2017-01-13 ENCOUNTER — TELEPHONE (OUTPATIENT)
Dept: NEUROLOGY | Facility: CLINIC | Age: 47
End: 2017-01-13

## 2017-01-13 ENCOUNTER — INITIAL CONSULT (OUTPATIENT)
Dept: OPHTHALMOLOGY | Facility: CLINIC | Age: 47
End: 2017-01-13
Payer: MEDICARE

## 2017-01-13 DIAGNOSIS — G93.2 IDIOPATHIC INTRACRANIAL HYPERTENSION: Primary | Chronic | ICD-10-CM

## 2017-01-13 DIAGNOSIS — H47.11 PAPILLEDEMA ASSOCIATED WITH INCREASED INTRACRANIAL PRESSURE: ICD-10-CM

## 2017-01-13 PROCEDURE — 92004 COMPRE OPH EXAM NEW PT 1/>: CPT | Mod: S$GLB,,, | Performed by: OPHTHALMOLOGY

## 2017-01-13 PROCEDURE — 92083 EXTENDED VISUAL FIELD XM: CPT | Mod: S$GLB,,, | Performed by: OPHTHALMOLOGY

## 2017-01-13 PROCEDURE — 92133 CPTRZD OPH DX IMG PST SGM ON: CPT | Mod: S$GLB,,, | Performed by: OPHTHALMOLOGY

## 2017-01-13 PROCEDURE — 99999 PR PBB SHADOW E&M-EST. PATIENT-LVL II: CPT | Mod: PBBFAC,,, | Performed by: OPHTHALMOLOGY

## 2017-01-13 NOTE — LETTER
January 13, 2017      Darrion Fuentes MD  [address]  [address]  [facility] [address]           [facility] - Ophthalmology  [address]  [place] [address]  Phone: [phone]  Fax: [phone]          Patient: Yanni Kaiser   MR Number: [id number]   YOB: 1970   Date of Visit: 1/13/2017       Dear Dr. Darrion Fuentes:    Thank you for referring Yanni Kaiser to me for evaluation. Attached you will find relevant portions of my assessment and plan of care.    If you have questions, please do not hesitate to call me. I look forward to following Yanni Kaiser along with you.    Sincerely,    Steve Canela MD    Enclosure  CC:  No Recipients    If you would like to receive this communication electronically, please contact [email or web address] or [phone] to request more information on [facility] access.    For providers and/or their staff who would like to refer a patient to [facility], please contact us through our one-stop-shop provider referral line, [facility], at [phone].    If you feel you have received this communication in error or would no longer like to receive these types of communications, please e-mail [email or web address]

## 2017-01-13 NOTE — PROGRESS NOTES
HPI     Referred by  ([facility])  Hx of migraines. Pt states dx w/(IIH) x years now.  Pt states OU decrease vision at dist and near. Pt states OS>OD burning and   painful past few weeks.  Constant headaches and left eye pain past few weeks. Vision seem to dim   and blurry.  LBS: undercontrol    I have personally interviewed the patient, reviewed the history and   examined the patient and agree with the technician's exam.    LP 3/9/2016 had opening pressure of 24 cm water. MRI 3/21/2016 normal. She   was first diagnosed with IIH in 2006. She had an eye exam six months ago;   she was told she had persistent optic disc swelling.       Last edited by Steve Canela MD on 1/13/2017  2:10 PM.     ROS     Positive for: Gastrointestinal, Neurological, Endocrine, Cardiovascular,   Eyes, Respiratory, Heme/Lymph    Negative for: Constitutional, Skin, Genitourinary, Musculoskeletal, HENT,   Psychiatric, Allergic/Imm    Last edited by Steve Canela MD on 1/13/2017  1:51 PM. (History)        Assessment /Plan     For exam results, see Encounter Report.    Idiopathic intracranial hypertension  -     Tate Visual Field - OU - Extended - Both Eyes  -     Posterior Segment OCT Optic Nerve- Both eyes    Papilledema associated with increased intracranial pressure  -     Posterior Segment OCT Optic Nerve- Both eyes      Both optic discs had mild haze of the disc margins. OCT testing demonstrated no edema in either disc. Ms. 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 The findings today would suggest that her intracranial pressure currently is normal. I will repeat her exam and visual field testing in six months.

## 2017-01-13 NOTE — PROGRESS NOTES
Reliable-------Fair---OD  Fixation-------Fair---OD  Coop---------Good---OD    24-2 SF HVF Done OD could not acquire OS severe blinking----[facility]  Dr. Canela

## 2017-01-13 NOTE — TELEPHONE ENCOUNTER
----- Message from Carlyle Gordillo MD sent at 1/12/2017 12:24 PM CST -----  Please let patient know that her diabetes is really well controlled and that her gout is not flared up.      Thanks,  Dr. Gordillo

## 2017-01-13 NOTE — TELEPHONE ENCOUNTER
Called to let patient know that we got her message about going to ER and that IR will be calling her to set up LP. LVM for her to please call clinic and to please leave message with a good time to reach her today.

## 2017-01-13 NOTE — TELEPHONE ENCOUNTER
----- Message from Anabel Waggoner sent at 1/13/2017  8:27 AM CST -----  Contact: SELF  Patient went to the ER for a migraine .She was told to inform her neurologist .     [phone]    LL

## 2017-01-13 NOTE — MR AVS SNAPSHOT
[facility] - Ophthalmology  [address]  [facility] [address]  Phone: [phone]  Fax: [phone]                  Yanni Kaiser   2017 1:00 PM   Initial consult    Description:  Female : 1970   Provider:  Steve Canela MD   Department:  [facility] - Ophthalmology           Reason for Visit     Concerns About Ocular Health           Diagnoses this Visit        Comments    Idiopathic intracranial hypertension    -  Primary     Papilledema associated with increased intracranial pressure                To Do List           Future Appointments        Provider Department Dept Phone    2/15/2017 9:40 AM MD Diego Lucas [facility] - Neurology [phone]    3/9/2017 1:30 PM Rosalva Taylor DPM [facility] - Podiatry [phone]      Goals (5 Years of Data)     None      Follow-Up and Disposition     Return in about 6 months (around 2017) for Exam and HVF.      [facility] On Call     [facility] On Call Nurse [facility] -  Assistance  Registered nurses in the [facility] On Call Center provide clinical advisement, health education, appointment booking, and other advisory services.  Call for this free service at [phone].             Medications           Message regarding Medications     Verify the changes and/or additions to your medication regime listed below are the same as discussed with your clinician today.  If any of these changes or additions are incorrect, please notify your healthcare provider.             Verify that the below list of medications is an accurate representation of the medications you are currently taking.  If none reported, the list may be blank. If incorrect, please contact your healthcare provider. Carry this list with you in case of emergency.           Current Medications     acetaZOLAMIDE (DIAMOX) 500 mg CpSR Take 500 mg by mouth 2 (two) times daily.    albuterol (PROVENTIL) 2.5 mg /3 mL (0.083 %) nebulizer solution Take 4 mLs (3.3333 mg total) by  nebulization every 4 (four) hours as needed for Wheezing or Shortness of Breath.    atorvastatin (LIPITOR) 20 MG tablet Take 1 tablet (20 mg total) by mouth once daily.    butalbital-acetaminophen-caffeine -40 mg (FIORICET, ESGIC) -40 mg per tablet TK 1 T PO Q 8 H PRF PAIN OR HEADACHES    capsaicin 0.1 % Crea Apply 1 application topically 2 (two) times daily.    EPIPEN 2-RHODA 0.3 mg/0.3 mL (1:1,000) AtIn     fluticasone (FLONASE) 50 mcg/actuation nasal spray 1 spray by Each Nare route daily as needed.    FLUTICASONE/SALMETEROL (ADVAIR DISKUS INHL) Inhale into the lungs.    gabapentin (NEURONTIN) 300 MG capsule Take 2 capsules (600 mg total) by mouth 3 (three) times daily.    lamotrigine (LAMICTAL) 25 MG tablet Take 1 tablet (25 mg total) by mouth 2 (two) times daily.    LINZESS 290 mcg Cap TK ONE CAPSULE PO D ON AN EMPTY STOMACH    losartan-hydrochlorothiazide 100-25 mg (HYZAAR) 100-25 mg per tablet Take 1 tablet by mouth once daily.    metformin (GLUCOPHAGE-XR) 500 MG 24 hr tablet Take 1 tablet (500 mg total) by mouth once daily.    montelukast (SINGULAIR) 10 mg tablet Take by mouth once daily.     oxycodone (ROXICODONE) 15 MG Tab Take 1 tablet (15 mg total) by mouth every 6 (six) hours as needed.    pantoprazole (PROTONIX) 40 MG tablet once daily.     primidone (MYSOLINE) 50 MG Tab Take 1 tablet (50 mg total) by mouth every evening.    primidone (MYSOLINE) 50 MG Tab TAKE 1 TABLET BY MOUTH EVERY EVENING    SYMBICORT 160-4.5 mcg/actuation HFAA 2 puffs every 12 (twelve) hours.     TAZTIA  mg CpSR TK ONE CAPSULE PO D    topiramate (TOPAMAX) 200 MG Tab Take 1 tablet (200 mg total) by mouth 2 (two) times daily.    venlafaxine (EFFEXOR-XR) 75 MG 24 hr capsule Take 1 capsule (75 mg total) by mouth every evening. Take half tablet    norethindrone (AYGESTIN) 5 mg Tab Take 1 tablet (5 mg total) by mouth once daily. TAKE D 1-10 Q MONTH    nystatin-triamcinolone (MYCOLOG) ointment Apply topically 2 (two) times  daily.           Clinical Reference Information           Vital Signs - Last Recorded     LMP                   10/13/2016 (Approximate)           Allergies as of 1/13/2017     No Known Allergies      Immunizations Administered on Date of Encounter - 1/13/2017     None      Orders Placed During Today's Visit      Normal Orders This Visit    Tate Visual Field - OU - Extended - Both Eyes     Posterior Segment OCT Optic Nerve- Both eyes       [facility] Sign-Up     Activating your [facility] account is as easy as 1-2-3!     1) Visit [email or web address], select Sign Up Now, enter this activation code and your date of birth, then select Next.  -[email or web address]  Expires: 2/23/2017  1:23 PM      2) Create a username and password to use when you visit [facility] in the future and select a security question in case you lose your password and select Next.    3) Enter your e-mail address and click Sign Up!    Additional Information  If you have questions, please e-mail [email or web address] or call [phone] to talk to our [facility] staff. Remember, [facility] is NOT to be used for urgent needs. For medical emergencies, dial 911.         Instructions    Repeat exam and visual field testing in six months.       Smoking Cessation     If you would like to quit smoking:   You may be eligible for free services if you are a [place] resident and started smoking cigarettes before September 1, 1988.  Call the Smoking Cessation Trust (SCT) toll free at (761) 205-5150 or (677) 230-3225.   Call 9-800-QUIT-NOW if you do not meet the above criteria.

## 2017-01-16 ENCOUNTER — HOSPITAL ENCOUNTER (EMERGENCY)
Facility: HOSPITAL | Age: 47
Discharge: HOME | End: 2017-01-16
Attending: EMERGENCY MEDICINE
Payer: MEDICARE

## 2017-01-16 ENCOUNTER — OFFICE VISIT (OUTPATIENT)
Dept: FAMILY MEDICINE | Facility: CLINIC | Age: 47
End: 2017-01-16
Payer: MEDICARE

## 2017-01-16 VITALS
HEART RATE: 90 BPM | OXYGEN SATURATION: 99 % | RESPIRATION RATE: 16 BRPM | TEMPERATURE: 98 F | HEIGHT: 64 IN | SYSTOLIC BLOOD PRESSURE: 117 MMHG | DIASTOLIC BLOOD PRESSURE: 59 MMHG | WEIGHT: 293 LBS | BODY MASS INDEX: 50.02 KG/M2

## 2017-01-16 VITALS
WEIGHT: 293 LBS | DIASTOLIC BLOOD PRESSURE: 92 MMHG | TEMPERATURE: 99 F | BODY MASS INDEX: 50.02 KG/M2 | HEART RATE: 109 BPM | HEIGHT: 64 IN | OXYGEN SATURATION: 94 % | SYSTOLIC BLOOD PRESSURE: 132 MMHG

## 2017-01-16 DIAGNOSIS — R06.2 WHEEZING: Primary | ICD-10-CM

## 2017-01-16 DIAGNOSIS — R06.02 SHORTNESS OF BREATH: ICD-10-CM

## 2017-01-16 DIAGNOSIS — Z72.0 TOBACCO USE: ICD-10-CM

## 2017-01-16 DIAGNOSIS — R06.02 SOB (SHORTNESS OF BREATH): ICD-10-CM

## 2017-01-16 DIAGNOSIS — J45.901 ASTHMA EXACERBATION: Primary | ICD-10-CM

## 2017-01-16 DIAGNOSIS — E11.8 CONTROLLED TYPE 2 DIABETES MELLITUS WITH COMPLICATION, WITHOUT LONG-TERM CURRENT USE OF INSULIN: Chronic | ICD-10-CM

## 2017-01-16 DIAGNOSIS — R06.4 LABORED BREATHING: ICD-10-CM

## 2017-01-16 LAB
ALBUMIN SERPL BCP-MCNC: 3.3 G/DL
ALP SERPL-CCNC: 77 U/L
ALT SERPL W/O P-5'-P-CCNC: 16 U/L
ANION GAP SERPL CALC-SCNC: 6 MMOL/L
AST SERPL-CCNC: 12 U/L
BASOPHILS # BLD AUTO: 0.04 K/UL
BASOPHILS NFR BLD: 0.6 %
BILIRUB SERPL-MCNC: 0.3 MG/DL
BUN SERPL-MCNC: 6 MG/DL
CALCIUM SERPL-MCNC: 8.8 MG/DL
CHLORIDE SERPL-SCNC: 111 MMOL/L
CO2 SERPL-SCNC: 23 MMOL/L
CREAT SERPL-MCNC: 1.1 MG/DL
DIFFERENTIAL METHOD: ABNORMAL
EOSINOPHIL # BLD AUTO: 0.3 K/UL
EOSINOPHIL NFR BLD: 4.4 %
ERYTHROCYTE [DISTWIDTH] IN BLOOD BY AUTOMATED COUNT: 20.4 %
EST. GFR  (AFRICAN AMERICAN): >60 ML/MIN/1.73 M^2
EST. GFR  (NON AFRICAN AMERICAN): >60 ML/MIN/1.73 M^2
GLUCOSE SERPL-MCNC: 118 MG/DL (ref 70–110)
GLUCOSE SERPL-MCNC: 134 MG/DL
HCT VFR BLD AUTO: 34 %
HGB BLD-MCNC: 10 G/DL
LYMPHOCYTES # BLD AUTO: 1 K/UL
LYMPHOCYTES NFR BLD: 16.5 %
MCH RBC QN AUTO: 22.9 PG
MCHC RBC AUTO-ENTMCNC: 29.4 %
MCV RBC AUTO: 78 FL
MONOCYTES # BLD AUTO: 0.4 K/UL
MONOCYTES NFR BLD: 6 %
NEUTROPHILS # BLD AUTO: 4.5 K/UL
NEUTROPHILS NFR BLD: 72 %
PLATELET # BLD AUTO: 315 K/UL
PMV BLD AUTO: 9.1 FL
POTASSIUM SERPL-SCNC: 3.8 MMOL/L
PROT SERPL-MCNC: 7.1 G/DL
RBC # BLD AUTO: 4.36 M/UL
SODIUM SERPL-SCNC: 140 MMOL/L
WBC # BLD AUTO: 6.19 K/UL

## 2017-01-16 PROCEDURE — 25000003 PHARM REV CODE 250: Performed by: EMERGENCY MEDICINE

## 2017-01-16 PROCEDURE — 99214 OFFICE O/P EST MOD 30 MIN: CPT | Mod: 25,S$GLB,, | Performed by: NURSE PRACTITIONER

## 2017-01-16 PROCEDURE — 94640 AIRWAY INHALATION TREATMENT: CPT | Mod: S$GLB,,, | Performed by: NURSE PRACTITIONER

## 2017-01-16 PROCEDURE — 93005 ELECTROCARDIOGRAM TRACING: CPT

## 2017-01-16 PROCEDURE — 3075F SYST BP GE 130 - 139MM HG: CPT | Mod: S$GLB,,, | Performed by: NURSE PRACTITIONER

## 2017-01-16 PROCEDURE — 63600175 PHARM REV CODE 636 W HCPCS: Performed by: EMERGENCY MEDICINE

## 2017-01-16 PROCEDURE — 4010F ACE/ARB THERAPY RXD/TAKEN: CPT | Mod: S$GLB,,, | Performed by: NURSE PRACTITIONER

## 2017-01-16 PROCEDURE — 96372 THER/PROPH/DIAG INJ SC/IM: CPT | Mod: 59,S$GLB,, | Performed by: NURSE PRACTITIONER

## 2017-01-16 PROCEDURE — 96375 TX/PRO/DX INJ NEW DRUG ADDON: CPT

## 2017-01-16 PROCEDURE — 80053 COMPREHEN METABOLIC PANEL: CPT

## 2017-01-16 PROCEDURE — 99285 EMERGENCY DEPT VISIT HI MDM: CPT | Mod: 25

## 2017-01-16 PROCEDURE — 96365 THER/PROPH/DIAG IV INF INIT: CPT

## 2017-01-16 PROCEDURE — 85025 COMPLETE CBC W/AUTO DIFF WBC: CPT

## 2017-01-16 PROCEDURE — 94644 CONT INHLJ TX 1ST HOUR: CPT

## 2017-01-16 PROCEDURE — 3044F HG A1C LEVEL LT 7.0%: CPT | Mod: S$GLB,,, | Performed by: NURSE PRACTITIONER

## 2017-01-16 PROCEDURE — 99999 PR PBB SHADOW E&M-EST. PATIENT-LVL IV: CPT | Mod: PBBFAC,,, | Performed by: NURSE PRACTITIONER

## 2017-01-16 PROCEDURE — 1159F MED LIST DOCD IN RCRD: CPT | Mod: S$GLB,,, | Performed by: NURSE PRACTITIONER

## 2017-01-16 PROCEDURE — 25000242 PHARM REV CODE 250 ALT 637 W/ HCPCS: Performed by: EMERGENCY MEDICINE

## 2017-01-16 PROCEDURE — 99499 UNLISTED E&M SERVICE: CPT | Mod: S$GLB,,, | Performed by: NURSE PRACTITIONER

## 2017-01-16 PROCEDURE — 82948 REAGENT STRIP/BLOOD GLUCOSE: CPT | Mod: S$GLB,,, | Performed by: NURSE PRACTITIONER

## 2017-01-16 PROCEDURE — 3078F DIAST BP <80 MM HG: CPT | Mod: S$GLB,,, | Performed by: NURSE PRACTITIONER

## 2017-01-16 RX ORDER — PREDNISONE 50 MG/1
50 TABLET ORAL DAILY
Qty: 4 TABLET | Refills: 0 | Status: SHIPPED | OUTPATIENT
Start: 2017-01-16 | End: 2017-01-20

## 2017-01-16 RX ORDER — BENZONATATE 100 MG/1
100 CAPSULE ORAL 3 TIMES DAILY PRN
Qty: 20 CAPSULE | Refills: 0 | Status: SHIPPED | OUTPATIENT
Start: 2017-01-16 | End: 2017-01-26

## 2017-01-16 RX ORDER — ALBUTEROL SULFATE 0.83 MG/ML
2.5 SOLUTION RESPIRATORY (INHALATION)
Status: COMPLETED | OUTPATIENT
Start: 2017-01-16 | End: 2017-01-16

## 2017-01-16 RX ORDER — ALBUTEROL SULFATE 2.5 MG/.5ML
15 SOLUTION RESPIRATORY (INHALATION) CONTINUOUS
Status: DISCONTINUED | OUTPATIENT
Start: 2017-01-16 | End: 2017-01-16 | Stop reason: HOSPADM

## 2017-01-16 RX ORDER — BUTALBITAL, ACETAMINOPHEN AND CAFFEINE 50; 325; 40 MG/1; MG/1; MG/1
2 TABLET ORAL
Status: COMPLETED | OUTPATIENT
Start: 2017-01-16 | End: 2017-01-16

## 2017-01-16 RX ORDER — IPRATROPIUM BROMIDE 0.5 MG/2.5ML
1 SOLUTION RESPIRATORY (INHALATION) CONTINUOUS
Status: DISCONTINUED | OUTPATIENT
Start: 2017-01-16 | End: 2017-01-16 | Stop reason: HOSPADM

## 2017-01-16 RX ORDER — METOCLOPRAMIDE HYDROCHLORIDE 5 MG/ML
10 INJECTION INTRAMUSCULAR; INTRAVENOUS
Status: COMPLETED | OUTPATIENT
Start: 2017-01-16 | End: 2017-01-16

## 2017-01-16 RX ORDER — LOSARTAN POTASSIUM AND HYDROCHLOROTHIAZIDE 12.5; 5 MG/1; MG/1
2 TABLET ORAL DAILY
Status: DISCONTINUED | OUTPATIENT
Start: 2017-01-17 | End: 2017-01-16

## 2017-01-16 RX ORDER — MAGNESIUM SULFATE 1 G/100ML
2 INJECTION INTRAVENOUS
Status: COMPLETED | OUTPATIENT
Start: 2017-01-16 | End: 2017-01-16

## 2017-01-16 RX ORDER — DOXYCYCLINE 100 MG/1
100 CAPSULE ORAL 2 TIMES DAILY
Qty: 20 CAPSULE | Refills: 0 | Status: SHIPPED | OUTPATIENT
Start: 2017-01-16 | End: 2017-01-23

## 2017-01-16 RX ORDER — HYDRALAZINE HYDROCHLORIDE 20 MG/ML
10 INJECTION INTRAMUSCULAR; INTRAVENOUS
Status: DISCONTINUED | OUTPATIENT
Start: 2017-01-16 | End: 2017-01-16

## 2017-01-16 RX ADMIN — MAGNESIUM SULFATE IN DEXTROSE 2 G: 10 INJECTION, SOLUTION INTRAVENOUS at 03:01

## 2017-01-16 RX ADMIN — ALBUTEROL SULFATE 15 MG: 2.5 SOLUTION RESPIRATORY (INHALATION) at 02:01

## 2017-01-16 RX ADMIN — IPRATROPIUM BROMIDE 1 MG: 0.5 SOLUTION RESPIRATORY (INHALATION) at 02:01

## 2017-01-16 RX ADMIN — ALBUTEROL SULFATE 2.5 MG: 0.83 SOLUTION RESPIRATORY (INHALATION) at 12:01

## 2017-01-16 RX ADMIN — BUTALBITAL, ACETAMINOPHEN AND CAFFEINE 2 TABLET: 50; 325; 40 TABLET ORAL at 03:01

## 2017-01-16 RX ADMIN — METOCLOPRAMIDE 10 MG: 5 INJECTION, SOLUTION INTRAMUSCULAR; INTRAVENOUS at 03:01

## 2017-01-16 RX ADMIN — SODIUM CHLORIDE 1000 ML: 0.9 INJECTION, SOLUTION INTRAVENOUS at 03:01

## 2017-01-16 NOTE — LETTER
January 16, 2017      Yanni Kaiser  [address] [place] [address]             [facility]  [address]  [place] [address]  Phone: [phone]  Fax: [phone] Ms. Kaiser    Was treated here on 01/16/2017    May Return to work/school on 01/18/2017    No Restrictions            [facility]

## 2017-01-16 NOTE — MR AVS SNAPSHOT
[facility]  [address] [place] [address]  Phone: [phone]  Fax: [phone]                  Yanni Kaiser   2017 12:15 PM   Office Visit    Description:  Female : 1970   Provider:  [facility]   Department:  [facility] - Family Medicine           Reason for Visit     Shortness of Breath     weezing     Cough           Diagnoses this Visit        Comments    Wheezing    -  Primary     SOB (shortness of breath)         Labored breathing         Controlled type 2 diabetes mellitus with complication, without long-term current use of insulin                To Do List           Future Appointments        Provider Department Dept Phone    2017 1:45 PM Carlyle Gordillo MD [facility] [phone]    2/15/2017 9:40 AM Riccardo Moore MD [facility] - Neurology [phone]    3/9/2017 1:30 PM Rosalva Taylor DPM [facility] Podiatry [phone]      Goals (5 Years of Data)     None      [facility] On [facility]     [facility] On Call Nurse [facility] - 24/7 Assistance  Registered nurses in the [facility] On Call Center provide clinical advisement, health education, appointment booking, and other advisory services.  Call for this free service at [phone].             Medications           Message regarding Medications     Verify the changes and/or additions to your medication regime listed below are the same as discussed with your clinician today.  If any of these changes or additions are incorrect, please notify your healthcare provider.        These medications were administered today        Dose Freq    albuterol nebulizer solution 2.5 mg 2.5 mg Clinic/HOD 1 time    Sig: Take 3 mLs (2.5 mg total) by nebulization one time.    Class: Normal    Route: Nebulization    methylPREDNISolone sod suc(PF) 125 mg/2 mL injection 125 mg 125 mg Clinic/HOD 1 time    Sig: Inject 125 mg into the vein one time.    Class: Normal    Route: Intravenous           Verify that  the below list of medications is an accurate representation of the medications you are currently taking.  If none reported, the list may be blank. If incorrect, please contact your healthcare provider. Carry this list with you in case of emergency.           Current Medications     acetaZOLAMIDE (DIAMOX) 500 mg CpSR Take 500 mg by mouth 2 (two) times daily.    albuterol (PROVENTIL) 2.5 mg /3 mL (0.083 %) nebulizer solution Take 4 mLs (3.3333 mg total) by nebulization every 4 (four) hours as needed for Wheezing or Shortness of Breath.    atorvastatin (LIPITOR) 20 MG tablet Take 1 tablet (20 mg total) by mouth once daily.    butalbital-acetaminophen-caffeine -40 mg (FIORICET, ESGIC) -40 mg per tablet TK 1 T PO Q 8 H PRF PAIN OR HEADACHES    EPIPEN 2-RHODA 0.3 mg/0.3 mL (1:1,000) AtIn     fluticasone (FLONASE) 50 mcg/actuation nasal spray 1 spray by Each Nare route daily as needed.    FLUTICASONE/SALMETEROL (ADVAIR DISKUS INHL) Inhale into the lungs.    gabapentin (NEURONTIN) 300 MG capsule Take 2 capsules (600 mg total) by mouth 3 (three) times daily.    lamotrigine (LAMICTAL) 25 MG tablet Take 1 tablet (25 mg total) by mouth 2 (two) times daily.    LINZESS 290 mcg Cap TK ONE CAPSULE PO D ON AN EMPTY STOMACH    losartan-hydrochlorothiazide 100-25 mg (HYZAAR) 100-25 mg per tablet Take 1 tablet by mouth once daily.    metformin (GLUCOPHAGE-XR) 500 MG 24 hr tablet Take 1 tablet (500 mg total) by mouth once daily.    montelukast (SINGULAIR) 10 mg tablet Take by mouth once daily.     oxycodone (ROXICODONE) 15 MG Tab Take 1 tablet (15 mg total) by mouth every 6 (six) hours as needed.    pantoprazole (PROTONIX) 40 MG tablet once daily.     primidone (MYSOLINE) 50 MG Tab Take 1 tablet (50 mg total) by mouth every evening.    SYMBICORT 160-4.5 mcg/actuation HFAA 2 puffs every 12 (twelve) hours.     TAZTIA  mg CpSR TK ONE CAPSULE PO D    topiramate (TOPAMAX) 200 MG Tab Take 1 tablet (200 mg total) by mouth 2  "(two) times daily.    venlafaxine (EFFEXOR-XR) 75 MG 24 hr capsule Take 1 capsule (75 mg total) by mouth every evening. Take half tablet    capsaicin 0.1 % Crea Apply 1 application topically 2 (two) times daily.    norethindrone (AYGESTIN) 5 mg Tab Take 1 tablet (5 mg total) by mouth once daily. TAKE D 1-10 Q MONTH    nystatin-triamcinolone (MYCOLOG) ointment Apply topically 2 (two) times daily.    primidone (MYSOLINE) 50 MG Tab TAKE 1 TABLET BY MOUTH EVERY EVENING           Clinical Reference Information           Vital Signs - Last Recorded  Most recent update: 1/16/2017 12:30 PM by Shoshana Canchola    BP Pulse Temp Ht Wt LMP    (!) 132/92 (BP Location: Right arm, Patient Position: Sitting) 109 98.9 °F (37.2 °C) (Oral) 5' 4" (1.626 m) (!) 166.7 kg (367 lb 9.9 oz) 10/13/2016 (Approximate)    SpO2 BMI             (!) 94% 63.1 kg/m2         Blood Pressure          Most Recent Value    BP  (!)  132/92      Allergies as of 1/16/2017     No Known Allergies      Immunizations Administered on Date of Encounter - 1/16/2017     None      Orders Placed During Today's Visit      Normal Orders This Visit    POCT glucose          1/16/2017 12:43 PM - Karely Chavez LPN      Component Results     Component Value Flag Ref Range Units Status    POC Glucose 118 (A) 70 - 110 mg/dL Final            Administrations This Visit     albuterol nebulizer solution 2.5 mg     Admin Date Action Dose Route Administered By             01/16/2017 Given 2.5 mg Nebulization Karely Chavez LPN                    methylPREDNISolone sod suc(PF) 125 mg/2 mL injection 125 mg     Admin Date Action Dose Route Administered By             01/16/2017 Given 125 mg Intravenous Karely Chavez LPN                      [facility] Sign-Up     Activating your [facility] account is as easy as 1-2-3!     1) Visit [email or web address], select Sign Up Now, enter this activation code and your date of birth, then select Next.  -[email or web address]  Expires: 2/23/2017  1:23 PM  "     2) Create a username and password to use when you visit [facility] in the future and select a security question in case you lose your password and select Next.    3) Enter your e-mail address and click Sign Up!    Additional Information  If you have questions, please e-mail [email or web address] or call [phone] to talk to our [facility] staff. Remember, [facility] is NOT to be used for urgent needs. For medical emergencies, dial 911.         Smoking Cessation     If you would like to quit smoking:   You may be eligible for free services if you are a [place] resident and started smoking cigarettes before September 1, 1988.  Call the Smoking Cessation Trust (SCT) toll free at (059) 660-7656 or ([phone] 090-1533.   Call 9-320-QUIT-NOW if you do not meet the above criteria.

## 2017-01-16 NOTE — ED TRIAGE NOTES
Pt arrived to ED via EMS from [facility] urgent care with c/o shortness of breath and cough secondary to an asthma exacerbation.   Pt states she has had a productive cough for three days and has white sputum.  Pt has audible wheezes and a raspy voice.  Pt is lethargic and states she also has head congestion.

## 2017-01-16 NOTE — ED PROVIDER NOTES
Encounter Date: 1/16/2017    SCRIBE #1 NOTE: I, Deepa Garcia, am scribing for, and in the presence of,  Bryant Acevedo MD. I have scribed the following portions of the note - Other sections scribed: HPI/ROS.       History     Chief Complaint   Patient presents with    Shortness of Breath     sent from [facility] for SOB r/t asthma, recieved 1 albuterol treatment and 125mg Solumederol     Review of patient's allergies indicates:  No Known Allergies  HPI Comments: CC: Shortness of Breath    HPI: This 46 y.o. female with asthma, GERD, hypertension, DM II and a PMHx of seizures presents to the ED c/o a three day history of worsening shortness of breath with associated wheezing, cough and bilateral leg swelling.  The patient reports she was sent from a [facility] clinic where she received one albuterol treatment and 125 mg of Solumedrol.  Treatment was also attempted at home with three breathing treatments per day and her three inhalers which provided no relief.  She smokes and has not been on steroids recently.  Her last attack was about two months ago and she has never been in ICU due to an asthma attack.  The patient denies fever, chills, emesis or any other associated symptoms.        The history is provided by the patient.     Past Medical History   Diagnosis Date    Allergy     Asthma     Diabetes mellitus     Diabetes mellitus, type 2     GERD (gastroesophageal reflux disease)     Hypertension     Seizures     Sleep apnea      No past medical history pertinent negatives.  Past Surgical History   Procedure Laterality Date    Cholecystectomy      Sinus surgery      Gallbladder surgery       Family History   Problem Relation Age of Onset    Cancer Mother     Hypertension Mother     Diabetes Mother     Stroke Father     Heart disease Father     COPD Father     Heart attack Father     Cancer Maternal Grandmother      Social History   Substance Use Topics    Smoking status: Current Every Day  Smoker     Packs/day: 1.00     Years: 15.00     Types: Cigarettes     Last attempt to quit: 6/10/2015    Smokeless tobacco: Never Used    Alcohol use No     Review of Systems   Constitutional: Negative for chills and fever.   HENT: Negative for ear pain and sore throat.    Eyes: Negative for visual disturbance.   Respiratory: Positive for cough, shortness of breath and wheezing.    Cardiovascular: Positive for leg swelling. Negative for chest pain.   Gastrointestinal: Negative for abdominal pain, nausea and vomiting.   Genitourinary: Negative for dysuria and hematuria.   Musculoskeletal: Negative for back pain.   Skin: Negative for rash.   Neurological: Positive for headaches.       Physical Exam   Initial Vitals   BP Pulse Resp Temp SpO2   01/16/17 1319 01/16/17 1319 01/16/17 1319 01/16/17 1319 01/16/17 1319   137/72 94 20 98.7 °F (37.1 °C) 92 %     Physical Exam    Nursing note and vitals reviewed.  Constitutional: She appears well-developed and well-nourished. She is not diaphoretic. No distress.   HENT:   Head: Normocephalic and atraumatic.   Right Ear: External ear normal.   Left Ear: External ear normal.   Nose: Nose normal.   Mouth/Throat: Oropharynx is clear and moist.   Eyes: Conjunctivae and EOM are normal. Pupils are equal, round, and reactive to light. Right eye exhibits no discharge. Left eye exhibits no discharge. No scleral icterus.   Neck: Normal range of motion. Neck supple.   Cardiovascular: Normal rate, regular rhythm, normal heart sounds and intact distal pulses.   No murmur heard.  Pulmonary/Chest: Breath sounds normal. No respiratory distress. She has no wheezes. She has no rhonchi. She has no rales.   Patient is in mild respiratory distress.  She has audible wheezing with expiration bilaterally.  Breath sounds symmetric.  There are scattered rhonchi.   Abdominal: Soft. Bowel sounds are normal. She exhibits no distension and no mass. There is no tenderness. There is no rebound and no  guarding.   Musculoskeletal: Normal range of motion. She exhibits no edema or tenderness.   No lower extremity edema.   Neurological: She is alert and oriented to person, place, and time. She has normal strength. No cranial nerve deficit or sensory deficit.   Skin: Skin is warm and dry. No rash noted. No erythema. No pallor.   Psychiatric: She has a normal mood and affect. Her behavior is normal. Judgment and thought content normal.         ED Course   Procedures  Labs Reviewed   CBC W/ AUTO DIFFERENTIAL - Abnormal; Notable for the following:        Result Value    Hemoglobin 10.0 (*)     Hematocrit 34.0 (*)     MCV 78 (*)     MCH 22.9 (*)     MCHC 29.4 (*)     RDW 20.4 (*)     MPV 9.1 (*)     Lymph% 16.5 (*)     All other components within normal limits   COMPREHENSIVE METABOLIC PANEL - Abnormal; Notable for the following:     Chloride 111 (*)     Glucose 134 (*)     Albumin 3.3 (*)     Anion Gap 6 (*)     All other components within normal limits     EKG Readings: (Independently Interpreted)   Initial Reading: No STEMI.   EKG reviewed and interpreted by me shows sinus rhythm at a rate of 86.  RI, QRS, QT intervals within normal limits.  Is a right axis deviation.  There is normal R-wave progression.  There areST segment or T-wave ischemic findings.       X-Rays:   Independently Interpreted Readings:   Other Readings:  Chest x-ray reviewed and interpreted by me shows no evidence of pulmonary edema, pneumothorax, or consolidations/ infiltrates.    Medical Decision Making:   ED Management:  This is the emergent evaluation of a 46-year-old female presents the emergency department complaining of shortness of breath and cough for 3 days.  Differential diagnosis at the time of initial evaluation included, but was not limited to: Asthma exacerbation, COPD, CHF, pneumonia, viral illness, pneumothorax.  I consider but doubt pulmonary embolus.  I also doubt ischemic cardiac causes.  The patient has audible wheezing on  examination.  She is coughing.    She improved significantly with IV fluids, magnesium, continuous DuoNeb treatments.  She also received Solu-Medrol at the outside facility.  I believe this is likely an asthma exacerbation in the setting of a viral URI.  I have advised the patient to continue all medications including using rescue inhaler as needed every 4 hours.  She is advised to use steroids.  Given her age and the fact that she is a smoker and COPD has not been ruled out, I will treat her also with antibiotics for chronic colonization.  She was advised to follow-up with her PCP this week and return of her symptoms worsen or she develops new symptoms.  The patient had an outlying reading of severe hypertension but when this was repeated correctly, her blood pressure has not significantly changed in the emergency department.                Scribe Attestation:   Scribe #1: I performed the above scribed service and the documentation accurately describes the services I performed. I attest to the accuracy of the note.    Attending Attestation:           Physician Attestation for Scribe:  Physician Attestation Statement for Scribe #1: I, Bryant Acevedo MD, reviewed documentation, as scribed by Deepa Garcia in my presence, and it is both accurate and complete.                 ED Course     Clinical Impression:   The primary encounter diagnosis was Asthma exacerbation. Diagnoses of Shortness of breath and Tobacco use were also pertinent to this visit.          Bryant Acevedo Jr., MD  01/16/17 1947

## 2017-01-16 NOTE — ED AVS SNAPSHOT
[facility]  [place] [address]               Yanni Kaiser   2017  2:19 PM   ED    Description:  Female : 1970   Department:  [facility]           Your Care was Coordinated By:     Provider Role From To    Bryant Acevedo Jr., MD Attending Provider 17 1423 --      Reason for Visit     Shortness of Breath           Diagnoses this Visit        Comments    Asthma exacerbation    -  Primary     Shortness of breath         Tobacco use           ED Disposition     None           To Do List           Follow-up Information     Follow up with Carlyle Gordillo MD. Schedule an appointment as soon as possible for a visit in 2 days.    Specialty:  Family Medicine    Why:  Follow-up with your PCP this week.  Please use albuterol breathing treatments every 4 hours as needed.  Continue steroids and antibiotics as prescribed.  Return for new or worsening symptoms such as worsening shortness of breath.    Contact information:    [address] [place] [address]  [phone]         These Medications        Disp Refills Start End    predniSONE (DELTASONE) 50 MG Tab 4 tablet 0 2017    Take 1 tablet (50 mg total) by mouth once daily. - Oral    Pharmacy: [facility] [address] [place] AT [facility] Ph #: [phone]       benzonatate (TESSALON) 100 MG capsule 20 capsule 0 2017    Take 1 capsule (100 mg total) by mouth 3 (three) times daily as needed for Cough. - Oral    Pharmacy: [facility] [address] [place] AT [facility] Ph #: [phone]       doxycycline (VIBRAMYCIN) 100 MG Cap 20 capsule 0 2017    Take 1 capsule (100 mg total) by mouth 2 (two) times daily. - Oral    Pharmacy: [facility] [address] [place] AT [facility] Ph #: [phone]         [facility]     [facility] Care [facility] - 24/7 Assistance  Registered nurses in the [facility] provide clinical advisement, health education, appointment booking, and other advisory services.  Call for this free service at [phone].             Medications           Message regarding Medications     Verify the changes and/or additions to your medication regime listed below are the same as discussed with your clinician today.  If any of these changes or additions are incorrect, please notify your healthcare provider.        START taking these NEW medications        Refills    predniSONE (DELTASONE) 50 MG Tab 0    Sig: Take 1 tablet (50 mg total) by mouth once daily.    Class: Print    Route: Oral    benzonatate (TESSALON) 100 MG capsule 0    Sig: Take 1 capsule (100 mg total) by mouth 3 (three) times daily as needed for Cough.    Class: Print    Route: Oral    doxycycline (VIBRAMYCIN) 100 MG Cap 0    Sig: Take 1 capsule (100 mg total) by mouth 2 (two) times daily.    Class: Print    Route: Oral      These medications were administered today        Dose Freq    albuterol sulfate nebulizer solution 15 mg 15 mg Continuous    Sig: Take 15 mg by nebulization continuous.    Class: Normal    Route: Nebulization    ipratropium 0.02 % nebulizer solution 1 mg 1 mg Continuous    Sig: Take 5 mLs (1 mg total) by nebulization continuous.    Class: Normal    Route: Nebulization    sodium chloride 0.9% bolus 1,000 mL 1,000 mL ED 1 Time    Sig: Inject 1,000 mLs into the vein ED 1 Time.    Class: Normal    Route: Intravenous    magnesium sulfate in dextrose IVPB (premix) 2 g 2 g ED 1 Time    Sig: Inject 200 mLs (2 g total) into the vein ED 1 Time.    Class: Normal    Route: Intravenous    butalbital-acetaminophen-caffeine -40 mg per tablet 2 tablet 2 tablet ED 1 Time    Sig: Take 2 tablets by mouth ED 1 Time.    Class: Normal    Route: Oral    metoclopramide HCl injection 10 mg 10 mg ED 1 Time    Sig:  Inject 2 mLs (10 mg total) into the vein ED 1 Time.    Class: Normal    Route: Intravenous      STOP taking these medications     venlafaxine (EFFEXOR-XR) 75 MG 24 hr capsule Take 1 capsule (75 mg total) by mouth every evening. Take half tablet           Verify that the below list of medications is an accurate representation of the medications you are currently taking.  If none reported, the list may be blank. If incorrect, please contact your healthcare provider. Carry this list with you in case of emergency.           Current Medications     acetaZOLAMIDE (DIAMOX) 500 mg CpSR Take 500 mg by mouth 2 (two) times daily.    albuterol (PROVENTIL) 2.5 mg /3 mL (0.083 %) nebulizer solution Take 4 mLs (3.3333 mg total) by nebulization every 4 (four) hours as needed for Wheezing or Shortness of Breath.    atorvastatin (LIPITOR) 20 MG tablet Take 1 tablet (20 mg total) by mouth once daily.    butalbital-acetaminophen-caffeine -40 mg (FIORICET, ESGIC) -40 mg per tablet TK 1 T PO Q 8 H PRF PAIN OR HEADACHES    capsaicin 0.1 % Crea Apply 1 application topically 2 (two) times daily.    EPIPEN 2-RHODA 0.3 mg/0.3 mL (1:1,000) AtIn     fluticasone (FLONASE) 50 mcg/actuation nasal spray 1 spray by Each Nare route daily as needed.    FLUTICASONE/SALMETEROL (ADVAIR DISKUS INHL) Inhale into the lungs.    gabapentin (NEURONTIN) 300 MG capsule Take 2 capsules (600 mg total) by mouth 3 (three) times daily.    lamotrigine (LAMICTAL) 25 MG tablet Take 1 tablet (25 mg total) by mouth 2 (two) times daily.    losartan-hydrochlorothiazide 100-25 mg (HYZAAR) 100-25 mg per tablet Take 1 tablet by mouth once daily.    metformin (GLUCOPHAGE-XR) 500 MG 24 hr tablet Take 1 tablet (500 mg total) by mouth once daily.    montelukast (SINGULAIR) 10 mg tablet Take by mouth once daily.     oxycodone (ROXICODONE) 15 MG Tab Take 1 tablet (15 mg total) by mouth every 6 (six) hours as needed.    pantoprazole (PROTONIX) 40 MG tablet once daily.      "primidone (MYSOLINE) 50 MG Tab Take 1 tablet (50 mg total) by mouth every evening.    primidone (MYSOLINE) 50 MG Tab TAKE 1 TABLET BY MOUTH EVERY EVENING    SYMBICORT 160-4.5 mcg/actuation HFAA 2 puffs every 12 (twelve) hours.     TAZTIA  mg CpSR TK ONE CAPSULE PO D    topiramate (TOPAMAX) 200 MG Tab Take 1 tablet (200 mg total) by mouth 2 (two) times daily.    albuterol sulfate nebulizer solution 15 mg Take 15 mg by nebulization continuous.    benzonatate (TESSALON) 100 MG capsule Take 1 capsule (100 mg total) by mouth 3 (three) times daily as needed for Cough.    doxycycline (VIBRAMYCIN) 100 MG Cap Take 1 capsule (100 mg total) by mouth 2 (two) times daily.    ipratropium 0.02 % nebulizer solution 1 mg Take 5 mLs (1 mg total) by nebulization continuous.    LINZESS 290 mcg Cap TK ONE CAPSULE PO D ON AN EMPTY STOMACH    norethindrone (AYGESTIN) 5 mg Tab Take 1 tablet (5 mg total) by mouth once daily. TAKE D 1-10 Q MONTH    nystatin-triamcinolone (MYCOLOG) ointment Apply topically 2 (two) times daily.    predniSONE (DELTASONE) 50 MG Tab Take 1 tablet (50 mg total) by mouth once daily.           Clinical Reference Information           Your Vitals Were     BP Pulse Temp Resp Height Weight    117/59 90 98.7 °F (37.1 °C) (Oral) 21 5' 4" (1.626 m) 167.8 kg (370 lb)    Last Period SpO2 BMI          10/13/2016 (Approximate) 99% 63.51 kg/m2        Allergies as of 1/16/2017     No Known Allergies      Immunizations Administered on Date of Encounter - 1/16/2017     None      ED Micro, Lab, POCT     Start Ordered       Status Ordering Provider    01/16/17 1432 01/16/17 1432  CBC auto differential  Once      Final result     01/16/17 1432 01/16/17 1432  Comprehensive metabolic panel  STAT      Final result       ED Imaging Orders     Start Ordered       Status Ordering Provider    01/16/17 1431 01/16/17 1432  X-Ray Chest AP Portable  1 time imaging      Final result       Discharge References/Attachments     ASTHMA, ACUTE " (ADULT) (ENGLISH)      Your Scheduled Appointments     Jan 17, 2017  1:45 PM CST   Established Patient Visit with Carlyle Gordillo MD   [facility] ([facility])    [address]  [place] [id number] [phone]            Feb 15, 2017  9:40 AM CST   Consult with Riccardo Moore MD   [facility] ([facility] )    [address]  [place] [id number] [phone]            Mar 09, 2017  1:30 PM CST   Established Patient Visit with Rosalva Taylor DPM   [facility] ([place])    [address]  [place] [address] [phone]              [facility] Sign-Up     Activating your [facility] account is as easy as 1-2-3!     1) Visit [email or web address], select Sign Up Now, enter this activation code and your date of birth, then select Next.  [email or web address]  Expires: 2/23/2017  1:23 PM      2) Create a username and password to use when you visit [facility] in the future and select a security question in case you lose your password and select Next.    3) Enter your e-mail address and click Sign Up!    Additional Information  If you have questions, please e-mail [email or web address] or call [phone] to talk to our [facility] staff. Remember, [facility] is NOT to be used for urgent needs. For medical emergencies, dial 911.         Smoking Cessation     If you would like to quit smoking:   You may be eligible for free services if you are a [place] resident and started smoking cigarettes before September 1, 1988.  Call the Smoking Cessation Trust ([employer]) toll free at ([phone] [phone] or ([phone] 186-2629.   Call 9-651-QUIT-NOW if you do not meet the above criteria.             [facility] complies with applicable Federal civil rights laws and does not discriminate on the basis of race, color, national origin, age, disability, or sex.        Language Assistance Services     ATTENTION: Language assistance services are available, free of charge.  Please call [phone].      ATENCIÓN: Si habla español, tiene a lee disposición servicios gratuitos de asistencia lingüística. Llame al [phone].     CHÚ Ý: N?u b?n nói Ti?ng Vi?t, có các d?ch v? h? tr? ngôn ng? mi?n phí dành cho b?n. G?i s? [phone].

## 2017-01-17 ENCOUNTER — OFFICE VISIT (OUTPATIENT)
Dept: FAMILY MEDICINE | Facility: CLINIC | Age: 47
End: 2017-01-17
Payer: MEDICARE

## 2017-01-17 VITALS
OXYGEN SATURATION: 97 % | DIASTOLIC BLOOD PRESSURE: 90 MMHG | BODY MASS INDEX: 50.02 KG/M2 | WEIGHT: 293 LBS | TEMPERATURE: 99 F | HEIGHT: 64 IN | SYSTOLIC BLOOD PRESSURE: 140 MMHG | HEART RATE: 106 BPM

## 2017-01-17 PROCEDURE — 3080F DIAST BP >= 90 MM HG: CPT | Mod: S$GLB,,, | Performed by: FAMILY MEDICINE

## 2017-01-17 PROCEDURE — 99999 PR PBB SHADOW E&M-EST. PATIENT-LVL III: CPT | Mod: PBBFAC,,, | Performed by: FAMILY MEDICINE

## 2017-01-17 PROCEDURE — 99499 UNLISTED E&M SERVICE: CPT | Mod: S$GLB,,, | Performed by: FAMILY MEDICINE

## 2017-01-17 PROCEDURE — 99214 OFFICE O/P EST MOD 30 MIN: CPT | Mod: 25,S$GLB,, | Performed by: FAMILY MEDICINE

## 2017-01-17 PROCEDURE — 94640 AIRWAY INHALATION TREATMENT: CPT | Mod: S$GLB,,, | Performed by: FAMILY MEDICINE

## 2017-01-17 PROCEDURE — 3077F SYST BP >= 140 MM HG: CPT | Mod: S$GLB,,, | Performed by: FAMILY MEDICINE

## 2017-01-17 PROCEDURE — 1159F MED LIST DOCD IN RCRD: CPT | Mod: S$GLB,,, | Performed by: FAMILY MEDICINE

## 2017-01-17 RX ORDER — IPRATROPIUM BROMIDE 0.5 MG/2.5ML
500 SOLUTION RESPIRATORY (INHALATION) 4 TIMES DAILY
Qty: 100 VIAL | Refills: 0 | Status: SHIPPED | OUTPATIENT
Start: 2017-01-17 | End: 2017-03-23

## 2017-01-17 RX ORDER — LEVALBUTEROL INHALATION SOLUTION 1.25 MG/3ML
1.25 SOLUTION RESPIRATORY (INHALATION)
Status: COMPLETED | OUTPATIENT
Start: 2017-01-17 | End: 2017-01-17

## 2017-01-17 RX ORDER — LEVALBUTEROL 1.25 MG/.5ML
1 SOLUTION, CONCENTRATE RESPIRATORY (INHALATION) EVERY 4 HOURS PRN
Qty: 100 EACH | Refills: 2 | Status: SHIPPED | OUTPATIENT
Start: 2017-01-17 | End: 2017-03-23

## 2017-01-17 RX ORDER — FLUTICASONE FUROATE AND VILANTEROL 200; 25 UG/1; UG/1
1 POWDER RESPIRATORY (INHALATION) DAILY
Qty: 1 EACH | Refills: 3 | Status: SHIPPED | OUTPATIENT
Start: 2017-01-17 | End: 2018-01-04 | Stop reason: ALTCHOICE

## 2017-01-17 RX ORDER — IPRATROPIUM BROMIDE 0.5 MG/2.5ML
0.5 SOLUTION RESPIRATORY (INHALATION)
Status: COMPLETED | OUTPATIENT
Start: 2017-01-17 | End: 2017-01-17

## 2017-01-17 RX ADMIN — LEVALBUTEROL INHALATION SOLUTION 1.25 MG: 1.25 SOLUTION RESPIRATORY (INHALATION) at 02:01

## 2017-01-17 RX ADMIN — IPRATROPIUM BROMIDE 0.5 MG: 0.5 SOLUTION RESPIRATORY (INHALATION) at 02:01

## 2017-01-17 NOTE — MR AVS SNAPSHOT
[facility]  [address]  [place] [address]  Phone: [phone]                  Yanni Kaiser   2017 1:45 PM   Office Visit    Description:  Female : 1970   Provider:  Carlyle Gordillo MD   Department:  [facility]           Reason for Visit     Asthma     Headache           Diagnoses this Visit        Comments    Moderate intermittent asthma, with acute exacerbation    -  Primary            To Do List           Future Appointments        Provider Department Dept Phone    2/15/2017 9:40 AM Riccardo Moore MD [facility] - Neurology [phone]    3/9/2017 1:30 PM Rosalva Taylor DPM [facility] [phone]      Goals (5 Years of Data)     None      Follow-Up and Disposition     Return if symptoms worsen or fail to improve.       These Medications        Disp Refills Start End    levalbuterol (XOPENEX) 1.25 mg/0.5 mL nebulizer solution 100 each 2 2017    Take 0.5 mLs (1.25 mg total) by nebulization every 4 (four) hours as needed for Wheezing. - Nebulization    Pharmacy: [facility] [address] [place] [address] [place] AT [facility] Ph #: [phone]       ipratropium (ATROVENT) 0.02 % nebulizer solution 100 vial 0 2017    Take 2.5 mLs (500 mcg total) by nebulization 4 (four) times daily. - Nebulization    Pharmacy: [facility] [address] [place] [address]  [facility] [place] AT [facility] Ph #: [phone]       fluticasone-vilanterol (BREO ELLIPTA) 200-25 mcg/dose DsDv diskus inhaler 1 each 3 2017     Inhale 1 puff into the lungs once daily. - Inhalation    Pharmacy: [facility] [address] [place] [address]  [facility] [place] AT [facility] Ph #: [phone]         [facility]     [facility] Nurse Care Line -  Assistance  Registered nurses in the [facility] provide clinical advisement, health education, appointment booking,  and other advisory services.  Call for this free service at [phone].             Medications           Message regarding Medications     Verify the changes and/or additions to your medication regime listed below are the same as discussed with your clinician today.  If any of these changes or additions are incorrect, please notify your healthcare provider.        START taking these NEW medications        Refills    levalbuterol (XOPENEX) 1.25 mg/0.5 mL nebulizer solution 2    Sig: Take 0.5 mLs (1.25 mg total) by nebulization every 4 (four) hours as needed for Wheezing.    Class: Normal    Route: Nebulization    ipratropium (ATROVENT) 0.02 % nebulizer solution 0    Sig: Take 2.5 mLs (500 mcg total) by nebulization 4 (four) times daily.    Class: Normal    Route: Nebulization    fluticasone-vilanterol (BREO ELLIPTA) 200-25 mcg/dose DsDv diskus inhaler 3    Sig: Inhale 1 puff into the lungs once daily.    Class: Normal    Route: Inhalation      These medications were administered today        Dose Freq    levalbuterol nebulizer solution 1.25 mg 1.25 mg Clinic/HOD 1 time    Sig: Take 3 mLs (1.25 mg total) by nebulization one time.    Class: Normal    Route: Nebulization    ipratropium 0.02 % nebulizer solution 0.5 mg 0.5 mg Clinic/HOD 1 time    Sig: Take 2.5 mLs (0.5 mg total) by nebulization one time.    Class: Normal    Route: Nebulization      STOP taking these medications     SYMBICORT 160-4.5 mcg/actuation HFAA 2 puffs every 12 (twelve) hours.            Verify that the below list of medications is an accurate representation of the medications you are currently taking.  If none reported, the list may be blank. If incorrect, please contact your healthcare provider. Carry this list with you in case of emergency.           Current Medications     acetaZOLAMIDE (DIAMOX) 500 mg CpSR Take 500 mg by mouth 2 (two) times daily.    albuterol (PROVENTIL) 2.5 mg /3 mL (0.083 %) nebulizer solution Take 4 mLs (3.3333 mg  total) by nebulization every 4 (four) hours as needed for Wheezing or Shortness of Breath.    atorvastatin (LIPITOR) 20 MG tablet Take 1 tablet (20 mg total) by mouth once daily.    benzonatate (TESSALON) 100 MG capsule Take 1 capsule (100 mg total) by mouth 3 (three) times daily as needed for Cough.    butalbital-acetaminophen-caffeine -40 mg (FIORICET, ESGIC) -40 mg per tablet TK 1 T PO Q 8 H PRF PAIN OR HEADACHES    capsaicin 0.1 % Crea Apply 1 application topically 2 (two) times daily.    doxycycline (VIBRAMYCIN) 100 MG Cap Take 1 capsule (100 mg total) by mouth 2 (two) times daily.    fluticasone (FLONASE) 50 mcg/actuation nasal spray 1 spray by Each Nare route daily as needed.    FLUTICASONE/SALMETEROL (ADVAIR DISKUS INHL) Inhale into the lungs.    gabapentin (NEURONTIN) 300 MG capsule Take 2 capsules (600 mg total) by mouth 3 (three) times daily.    lamotrigine (LAMICTAL) 25 MG tablet Take 1 tablet (25 mg total) by mouth 2 (two) times daily.    LINZESS 290 mcg Cap TK ONE CAPSULE PO D ON AN EMPTY STOMACH    losartan-hydrochlorothiazide 100-25 mg (HYZAAR) 100-25 mg per tablet Take 1 tablet by mouth once daily.    metformin (GLUCOPHAGE-XR) 500 MG 24 hr tablet Take 1 tablet (500 mg total) by mouth once daily.    montelukast (SINGULAIR) 10 mg tablet Take by mouth once daily.     oxycodone (ROXICODONE) 15 MG Tab Take 1 tablet (15 mg total) by mouth every 6 (six) hours as needed.    pantoprazole (PROTONIX) 40 MG tablet once daily.     predniSONE (DELTASONE) 50 MG Tab Take 1 tablet (50 mg total) by mouth once daily.    primidone (MYSOLINE) 50 MG Tab Take 1 tablet (50 mg total) by mouth every evening.    TAZTIA  mg CpSR TK ONE CAPSULE PO D    topiramate (TOPAMAX) 200 MG Tab Take 1 tablet (200 mg total) by mouth 2 (two) times daily.    EPIPEN 2-RHODA 0.3 mg/0.3 mL (1:1,000) AtIn     fluticasone-vilanterol (BREO ELLIPTA) 200-25 mcg/dose DsDv diskus inhaler Inhale 1 puff into the lungs once daily.     "ipratropium (ATROVENT) 0.02 % nebulizer solution Take 2.5 mLs (500 mcg total) by nebulization 4 (four) times daily.    levalbuterol (XOPENEX) 1.25 mg/0.5 mL nebulizer solution Take 0.5 mLs (1.25 mg total) by nebulization every 4 (four) hours as needed for Wheezing.    norethindrone (AYGESTIN) 5 mg Tab Take 1 tablet (5 mg total) by mouth once daily. TAKE D 1-10 Q MONTH    nystatin-triamcinolone (MYCOLOG) ointment Apply topically 2 (two) times daily.           Clinical Reference Information           Vital Signs - Last Recorded  Most recent update: 1/17/2017  2:14 PM by Kathleen Dhaliwal MA    BP Pulse Temp Ht    (!) 140/90 (BP Location: Right arm, Patient Position: Sitting, BP Method: Manual) 106 98.9 °F (37.2 °C) (Oral) 5' 4" (1.626 m)    Wt LMP SpO2 BMI    (!) 170.3 kg (375 lb 7.1 oz) 10/13/2016 (Approximate) 97% 64.44 kg/m2      Blood Pressure          Most Recent Value    BP  (!)  140/90      Allergies as of 1/17/2017     No Known Allergies      Immunizations Administered on Date of Encounter - 1/17/2017     None      Orders Placed During Today's Visit      Normal Orders This Visit    Ambulatory referral to Pulmonology       Administrations This Visit     ipratropium 0.02 % nebulizer solution 0.5 mg     Admin Date Action Dose Route Administered By             01/17/2017 Given 0.5 mg Nebulization Evelina Clark LPN                    levalbuterol nebulizer solution 1.25 mg     Admin Date Action Dose Route Administered By             01/17/2017 Given 1.25 mg Nebulization Evelina Clark LPN                      [facility] Sign-Up     Activating your [facility] account is as easy as 1-2-3!     1) Visit [email or web address], select Sign Up Now, enter this activation code and your date of birth, then select Next.  [email or web address]  Expires: 2/23/2017  1:23 PM      2) Create a username and password to use when you visit [facility] in the future and select a security question in case you lose your password and select " Next.    3) Enter your e-mail address and click Sign Up!    Additional Information  If you have questions, please e-mail [email or web address] or call [phone] to talk to our [facility] staff. Remember, [facility] is NOT to be used for urgent needs. For medical emergencies, dial 911.         Instructions    1.  Patient to stop symbicort and advair  2.  Start breo in 4 days and stop the albuterol and ipratropium  3.  Albuterol nebulizer every 4 hours  4.  Ipratropium nebulizer every 6 hours         Smoking Cessation     If you would like to quit smoking:   You may be eligible for free services if you are a [place] resident and started smoking cigarettes before September 1, 1988.  Call the Smoking Cessation Trust ([facility]) toll free at [phone] or [phone].   Call 7-896-QUIT-NOW if you do not meet the above criteria.

## 2017-01-17 NOTE — PROGRESS NOTES
Xopenex and Atrovent was given per order dose via nebulizer treatment. Patient tolerated will. Patient was advise to wait 15 min for any possible reactions.

## 2017-01-18 RX ORDER — MONTELUKAST SODIUM 10 MG/1
10 TABLET ORAL DAILY
Qty: 30 TABLET | Refills: 5 | Status: SHIPPED | OUTPATIENT
Start: 2017-01-18 | End: 2017-05-08

## 2017-01-18 NOTE — PROGRESS NOTES
"Routine Office Visit    Patient Name: Yanni Kaiser    : 1970  MRN: [id number]    Subjective:  Yanni is a 46 y.o. female who presents today for:    1. Shortness of breath  Patient states that she was in the ED yesterday for an asthma attack.  She states that she was given two bags of magnesium and several nebulizer treatments on top of steroids.  She states that all the medication kept her awake last night and kept her having tremors.  There has been no fevers or chills.  She states that it "has been so bad I haven't been able to smoke".  Patient was concerned about potentially having a seizure because she has not been able to sleep.  She continues to have significant shortness of breath.  She states that she has not taken any of her medications today due to being afraid it would keep her up at night.     Past Medical History  Past Medical History   Diagnosis Date    Allergy     Asthma     Diabetes mellitus     Diabetes mellitus, type 2     GERD (gastroesophageal reflux disease)     Hypertension     Seizures     Sleep apnea        Past Surgical History  Past Surgical History   Procedure Laterality Date    Cholecystectomy      Sinus surgery      Gallbladder surgery         Family History  Family History   Problem Relation Age of Onset    Cancer Mother     Hypertension Mother     Diabetes Mother     Stroke Father     Heart disease Father     COPD Father     Heart attack Father     Cancer Maternal Grandmother        Social History  Social History     Social History    Marital status:      Spouse name: N/A    Number of children: N/A    Years of education: N/A     Occupational History    Not on file.     Social History Main Topics    Smoking status: Current Every Day Smoker     Packs/day: 1.00     Years: 15.00     Types: Cigarettes     Last attempt to quit: 6/10/2015    Smokeless tobacco: Never Used    Alcohol use No    Drug use: No    Sexual activity: Yes     Partners: " Male     Birth control/ protection: None     Other Topics Concern    Not on file     Social History Narrative       Current Medications  Current Outpatient Prescriptions on File Prior to Visit   Medication Sig Dispense Refill    acetaZOLAMIDE (DIAMOX) 500 mg CpSR Take 500 mg by mouth 2 (two) times daily.      albuterol (PROVENTIL) 2.5 mg /3 mL (0.083 %) nebulizer solution Take 4 mLs (3.3333 mg total) by nebulization every 4 (four) hours as needed for Wheezing or Shortness of Breath.  0    atorvastatin (LIPITOR) 20 MG tablet Take 1 tablet (20 mg total) by mouth once daily. 90 tablet 1    benzonatate (TESSALON) 100 MG capsule Take 1 capsule (100 mg total) by mouth 3 (three) times daily as needed for Cough. 20 capsule 0    butalbital-acetaminophen-caffeine -40 mg (FIORICET, ESGIC) -40 mg per tablet TK 1 T PO Q 8 H PRF PAIN OR HEADACHES  2    capsaicin 0.1 % Crea Apply 1 application topically 2 (two) times daily. 56.6 g 1    doxycycline (VIBRAMYCIN) 100 MG Cap Take 1 capsule (100 mg total) by mouth 2 (two) times daily. 20 capsule 0    fluticasone (FLONASE) 50 mcg/actuation nasal spray 1 spray by Each Nare route daily as needed. 16 g 1    FLUTICASONE/SALMETEROL (ADVAIR DISKUS INHL) Inhale into the lungs.      gabapentin (NEURONTIN) 300 MG capsule Take 2 capsules (600 mg total) by mouth 3 (three) times daily. 180 capsule 11    lamotrigine (LAMICTAL) 25 MG tablet Take 1 tablet (25 mg total) by mouth 2 (two) times daily. 60 tablet 11    LINZESS 290 mcg Cap TK ONE CAPSULE PO D ON AN EMPTY STOMACH  5    losartan-hydrochlorothiazide 100-25 mg (HYZAAR) 100-25 mg per tablet Take 1 tablet by mouth once daily. 90 tablet 4    metformin (GLUCOPHAGE-XR) 500 MG 24 hr tablet Take 1 tablet (500 mg total) by mouth once daily. 90 tablet 1    montelukast (SINGULAIR) 10 mg tablet Take by mouth once daily.   5    oxycodone (ROXICODONE) 15 MG Tab Take 1 tablet (15 mg total) by mouth every 6 (six) hours as needed.  "40 tablet 0    pantoprazole (PROTONIX) 40 MG tablet once daily.   0    predniSONE (DELTASONE) 50 MG Tab Take 1 tablet (50 mg total) by mouth once daily. 4 tablet 0    primidone (MYSOLINE) 50 MG Tab Take 1 tablet (50 mg total) by mouth every evening. 90 tablet 3    TAZTIA  mg CpSR TK ONE CAPSULE PO D  2    topiramate (TOPAMAX) 200 MG Tab Take 1 tablet (200 mg total) by mouth 2 (two) times daily. 60 tablet 11    EPIPEN 2-RHODA 0.3 mg/0.3 mL (1:1,000) AtIn   1    norethindrone (AYGESTIN) 5 mg Tab Take 1 tablet (5 mg total) by mouth once daily. TAKE D 1-10 Q MONTH 10 tablet 12    nystatin-triamcinolone (MYCOLOG) ointment Apply topically 2 (two) times daily. 60 g 2     No current facility-administered medications on file prior to visit.        Allergies   Review of patient's allergies indicates:  No Known Allergies    Review of Systems (Pertinent positives)  Review of Systems   Constitutional: Negative.    HENT: Negative.    Eyes: Negative.    Respiratory: Positive for cough, sputum production, shortness of breath and wheezing. Negative for hemoptysis.    Cardiovascular: Negative.    Gastrointestinal: Negative.    Musculoskeletal: Negative.    Skin: Negative.          Visit Vitals    BP (!) 140/90 (BP Location: Right arm, Patient Position: Sitting, BP Method: Manual)    Pulse 106    Temp 98.9 °F (37.2 °C) (Oral)    Ht 5' 4" (1.626 m)    Wt (!) 170.3 kg (375 lb 7.1 oz)    LMP 10/13/2016 (Approximate)    SpO2 97%    BMI 64.44 kg/m2       GENERAL APPEARANCE: appears to be in distress from shortness of breath  HEENT: PERRL, EOMI, Sclera clear, anicteric, Oropharynx clear, no lesions, Neck supple with midline trachea  NECK: normal, supple, no adenopathy, thyroid normal in size  RESPIRATORY: appears short of breath and is coughing; wheezing throughout in all fields; no crackles; no retractions seen  HEART: regular rate and rhythm, S1, S2 normal, no murmur, click, rub or gallop.    ABDOMEN: abdomen is soft " without tenderness, no masses, no hernias, no organomegaly, no rebound, no guarding. Suprapubic tenderness absent. No CVA tenderness.  SKIN: no rashes, no wounds, no other lesions  PSYCH: Alert, oriented x 3, thought content appropriate, speech normal, pleasant and cooperative, good eye contact    Assessment/Plan:  Yanni Kaiser is a 46 y.o. female who presents today for :    Yanni was seen today for asthma and headache.    Diagnoses and all orders for this visit:    Moderate intermittent asthma, with acute exacerbation  -     levalbuterol nebulizer solution 1.25 mg; Take 3 mLs (1.25 mg total) by nebulization one time.  -     ipratropium 0.02 % nebulizer solution 0.5 mg; Take 2.5 mLs (0.5 mg total) by nebulization one time.  -     Ambulatory referral to Pulmonology  -     levalbuterol (XOPENEX) 1.25 mg/0.5 mL nebulizer solution; Take 0.5 mLs (1.25 mg total) by nebulization every 4 (four) hours as needed for Wheezing.  -     ipratropium (ATROVENT) 0.02 % nebulizer solution; Take 2.5 mLs (500 mcg total) by nebulization 4 (four) times daily.  -     fluticasone-vilanterol (BREO ELLIPTA) 200-25 mcg/dose DsDv diskus inhaler; Inhale 1 puff into the lungs once daily.      1.  Patient to take inhalers and nebulizers as prescribed  2.  Patient to follow up in 3 months or sooner if needed  3.  She is to go to the ED should symptoms worsen'  4.  Patient to stop taking both advair and symbicort  5.  Will start on breo per her request  6.  Patient to abstain from smoking  7.  Referring to pulmonology for further management of asthma    Carlyle Gordillo MD

## 2017-01-18 NOTE — TELEPHONE ENCOUNTER
Patient states that Dr. Gordillo has to sign off on one of her medications. Called [facility] and was told that one the patient's insurance company will not pay for the Xopenex until 01/20/2017 and the Atrovent will be in stock today. Patient advised.  Patient also requested a refill on Singulair.

## 2017-01-18 NOTE — TELEPHONE ENCOUNTER
----- Message from Edna Minor sent at 1/17/2017  4:30 PM CST -----  Contact: Self  Pt calling regarding medication. Please call [phone]

## 2017-01-19 ENCOUNTER — TELEPHONE (OUTPATIENT)
Dept: FAMILY MEDICINE | Facility: CLINIC | Age: 47
End: 2017-01-19

## 2017-01-19 NOTE — PROGRESS NOTES
Subjective:       Patient ID: Yanni Kaiser is a 46 y.o. female.    Chief Complaint: Shortness of Breath (x3 days); weezing (x3days); and Cough (x 3days)    Shortness of Breath   This is a recurrent problem. The current episode started in the past 7 days. The problem occurs constantly. The problem has been gradually worsening. Associated symptoms include coryza, headaches, leg swelling (bilateral) and wheezing. Pertinent negatives include no abdominal pain, chest pain, claudication, ear pain, fever, neck pain, rhinorrhea, sore throat, sputum production or swollen glands. The symptoms are aggravated by any activity. The patient has no known risk factors for DVT/PE. She has tried beta agonist inhalers for the symptoms. The treatment provided no relief. Her past medical history is significant for asthma.   Cough   This is a new problem. The current episode started in the past 7 days. The problem has been gradually worsening. The problem occurs every few minutes. Associated symptoms include headaches, nasal congestion, shortness of breath and wheezing. Pertinent negatives include no chest pain, chills, ear congestion, ear pain, fever, myalgias, postnasal drip, rhinorrhea, sore throat or weight loss. Nothing aggravates the symptoms. She has tried a beta-agonist inhaler for the symptoms. Her past medical history is significant for asthma.     Review of Systems   Constitutional: Negative for chills, fever and weight loss.   HENT: Negative for ear pain, postnasal drip, rhinorrhea, sinus pressure, sneezing and sore throat.    Respiratory: Positive for cough, chest tightness, shortness of breath and wheezing. Negative for sputum production.    Cardiovascular: Positive for leg swelling (bilateral). Negative for chest pain, palpitations and claudication.   Gastrointestinal: Negative for abdominal pain.   Musculoskeletal: Negative for myalgias and neck pain.   Skin: Negative for color change.   Neurological: Positive for  headaches. Negative for dizziness and weakness.   Hematological: Negative for adenopathy. Does not bruise/bleed easily.       Objective:      Physical Exam   Constitutional: She appears well-developed and well-nourished. No distress.   HENT:   Head: Normocephalic and atraumatic.   Right Ear: Tympanic membrane, external ear and ear canal normal.   Left Ear: Tympanic membrane, external ear and ear canal normal.   Nose: Mucosal edema present.   Mouth/Throat: Oropharynx is clear and moist. Mucous membranes are not dry. No oropharyngeal exudate, posterior oropharyngeal edema or posterior oropharyngeal erythema.   Cardiovascular: Normal rate and regular rhythm.    No murmur heard.  Pulmonary/Chest: Accessory muscle usage present. She is in respiratory distress (mild). She has wheezes (audible). She has rhonchi (scattered). She has no rales.   Lymphadenopathy:     She has no cervical adenopathy.   Skin: Skin is warm and dry.   Nursing note and vitals reviewed.      Assessment:       1. Wheezing    2. SOB (shortness of breath)    3. Labored breathing    4. Controlled type 2 diabetes mellitus with complication, without long-term current use of insulin        Plan:       Yanni was seen today for shortness of breath, weezing and cough.    Diagnoses and all orders for this visit:    Wheezing  -     albuterol nebulizer solution 2.5 mg; Take 3 mLs (2.5 mg total) by nebulization one time.  -     methylPREDNISolone sod suc(PF) 125 mg/2 mL injection 125 mg; Inject 125 mg into the vein one time.    SOB (shortness of breath)  -     albuterol nebulizer solution 2.5 mg; Take 3 mLs (2.5 mg total) by nebulization one time.  -     methylPREDNISolone sod suc(PF) 125 mg/2 mL injection 125 mg; Inject 125 mg into the vein one time.    Labored breathing  -     albuterol nebulizer solution 2.5 mg; Take 3 mLs (2.5 mg total) by nebulization one time.  -     methylPREDNISolone sod suc(PF) 125 mg/2 mL injection 125 mg; Inject 125 mg into the vein  one time.    Controlled type 2 diabetes mellitus with complication, without long-term current use of insulin  -     POCT glucose    Patient sent to ED for further evaluation of cough and SOB after given the a DuoNeb and solumedrol patient still continue to be in respiratory distress and audible wheezing bilaterally.

## 2017-01-20 ENCOUNTER — HOSPITAL ENCOUNTER (OUTPATIENT)
Dept: INTERVENTIONAL RADIOLOGY/VASCULAR | Facility: HOSPITAL | Age: 47
Discharge: HOME | End: 2017-01-20
Attending: NEUROLOGICAL SURGERY
Payer: MEDICARE

## 2017-01-20 DIAGNOSIS — G89.29 CHRONIC NONINTRACTABLE HEADACHE, UNSPECIFIED HEADACHE TYPE: ICD-10-CM

## 2017-01-20 DIAGNOSIS — R51.9 CHRONIC NONINTRACTABLE HEADACHE, UNSPECIFIED HEADACHE TYPE: ICD-10-CM

## 2017-01-20 DIAGNOSIS — G93.2 IIH (IDIOPATHIC INTRACRANIAL HYPERTENSION): ICD-10-CM

## 2017-01-20 NOTE — H&P
This patient exceeds the weight limit (300 lbs) for WB flouro table. She previously underwent LP at [facility]. She will be rescheduled there.

## 2017-01-25 ENCOUNTER — HOSPITAL ENCOUNTER (OUTPATIENT)
Dept: RADIOLOGY | Facility: HOSPITAL | Age: 47
Discharge: HOME | End: 2017-01-25
Attending: NEUROLOGICAL SURGERY
Payer: MEDICARE

## 2017-01-25 DIAGNOSIS — R56.9 CONVULSIONS, UNSPECIFIED CONVULSION TYPE: ICD-10-CM

## 2017-01-25 DIAGNOSIS — G93.2 IIH (IDIOPATHIC INTRACRANIAL HYPERTENSION): ICD-10-CM

## 2017-01-25 DIAGNOSIS — G89.29 CHRONIC NONINTRACTABLE HEADACHE, UNSPECIFIED HEADACHE TYPE: ICD-10-CM

## 2017-01-25 DIAGNOSIS — R51.9 CHRONIC NONINTRACTABLE HEADACHE, UNSPECIFIED HEADACHE TYPE: ICD-10-CM

## 2017-01-25 LAB
CLARITY CSF: CLEAR
COLOR CSF: COLORLESS
LYMPHOCYTES NFR CSF MANUAL: 72 %
MONOS+MACROS NFR CSF MANUAL: 21 %
NEUTROPHILS NFR CSF MANUAL: 7 %
PROT CSF-MCNC: 56 MG/DL
RBC # CSF: 146 /CU MM
SPECIMEN VOL CSF: 2 ML
WBC # CSF: 3 /CU MM

## 2017-01-25 PROCEDURE — 62270 DX LMBR SPI PNXR: CPT | Mod: TC

## 2017-01-25 PROCEDURE — 62270 DX LMBR SPI PNXR: CPT | Mod: ,,, | Performed by: RADIOLOGY

## 2017-01-25 PROCEDURE — 77003 FLUOROGUIDE FOR SPINE INJECT: CPT | Mod: 26,,, | Performed by: RADIOLOGY

## 2017-01-25 PROCEDURE — 89051 BODY FLUID CELL COUNT: CPT

## 2017-01-25 PROCEDURE — 84157 ASSAY OF PROTEIN OTHER: CPT

## 2017-01-25 NOTE — PROCEDURES
Radiology Post-Procedure Note    Pre Op Diagnosis: idiopathic intracranial hypertension    Post Op Diagnosis: Same    Procedure: lumbar puncture    Procedure performed by: Ugo Blackmon MD (resident); Elias Churchill MD (supervising staff)      Written Informed Consent Obtained: Yes    Opening Pressure: 14 cm H2O    Closing pressure: 12 cm H2O    Specimen Removed: 10 mL CSF    Estimated Blood Loss: Minimal    Findings: Following written informed consent and sterile prep and drape, a 22 gauge spinal needle was inserted at L2 - L3 intralaminar space under fluoroscopic surveillance.  10 mL clear CSF removed and sent to the lab for further analysis.  There were no complications.    Patient tolerated procedure well.    Ugo Blackmon MD  Resident  Department of Radiology  Pager: [phone]

## 2017-01-30 DIAGNOSIS — G93.2 PSEUDOTUMOR CEREBRI: Chronic | ICD-10-CM

## 2017-01-30 DIAGNOSIS — M72.2 PLANTAR FASCIITIS OF RIGHT FOOT: ICD-10-CM

## 2017-01-30 NOTE — TELEPHONE ENCOUNTER
----- Message from [facility] SVETLANA Collins sent at 1/30/2017  1:34 PM CST -----  Contact: self  Medication Refill. Pt can be reach at [phone]. Thanks!    oxycodone (ROXICODONE) 15 MG [facility] [address] [place] [address] EXPY AT [facility] OF [facility]

## 2017-01-31 RX ORDER — OXYCODONE HYDROCHLORIDE 15 MG/1
15 TABLET ORAL EVERY 6 HOURS PRN
Qty: 40 TABLET | Refills: 0 | Status: ON HOLD | OUTPATIENT
Start: 2017-01-31 | End: 2017-02-23 | Stop reason: HOSPADM

## 2017-02-06 ENCOUNTER — APPOINTMENT (OUTPATIENT)
Dept: RADIOLOGY | Facility: HOSPITAL | Age: 47
End: 2017-02-06
Attending: FAMILY MEDICINE
Payer: MEDICARE

## 2017-02-06 ENCOUNTER — OFFICE VISIT (OUTPATIENT)
Dept: FAMILY MEDICINE | Facility: CLINIC | Age: 47
End: 2017-02-06
Payer: MEDICARE

## 2017-02-06 VITALS
HEIGHT: 64 IN | TEMPERATURE: 98 F | BODY MASS INDEX: 50.02 KG/M2 | DIASTOLIC BLOOD PRESSURE: 88 MMHG | WEIGHT: 293 LBS | SYSTOLIC BLOOD PRESSURE: 140 MMHG | OXYGEN SATURATION: 94 % | HEART RATE: 78 BPM

## 2017-02-06 DIAGNOSIS — R10.9 RIGHT FLANK PAIN: ICD-10-CM

## 2017-02-06 DIAGNOSIS — R10.9 RIGHT FLANK PAIN: Primary | ICD-10-CM

## 2017-02-06 PROCEDURE — 74000 XR ABDOMEN AP 1 VIEW: CPT | Mod: TC,PN

## 2017-02-06 PROCEDURE — 74000 XR ABDOMEN AP 1 VIEW: CPT | Mod: 26,,, | Performed by: RADIOLOGY

## 2017-02-06 PROCEDURE — 3079F DIAST BP 80-89 MM HG: CPT | Mod: S$GLB,,, | Performed by: FAMILY MEDICINE

## 2017-02-06 PROCEDURE — 99999 PR PBB SHADOW E&M-EST. PATIENT-LVL III: CPT | Mod: PBBFAC,,, | Performed by: FAMILY MEDICINE

## 2017-02-06 PROCEDURE — 3077F SYST BP >= 140 MM HG: CPT | Mod: S$GLB,,, | Performed by: FAMILY MEDICINE

## 2017-02-06 PROCEDURE — 99214 OFFICE O/P EST MOD 30 MIN: CPT | Mod: S$GLB,,, | Performed by: FAMILY MEDICINE

## 2017-02-06 NOTE — MR AVS SNAPSHOT
[facility]  [address]  [place] [address]  Phone: [phone]                  Yanni Kaiser   2017 9:30 AM   Office Visit    Description:  Female : 1970   Provider:  Carlyle Gordillo MD   Department:  [facility]           Reason for Visit     Foot Pain     Back Pain           Diagnoses this Visit        Comments    Right flank pain    -  Primary            To Do List           Future Appointments        Provider Department Dept Phone    2/15/2017 9:40 AM Riccardo Moore MD [facility] - Neurology [phone]    3/9/2017 1:30 PM Rosalva Taylor DPM [facility] Podiatry [phone]    3/24/2017 10:30 AM Pj Harrell MD [facility] [phone]      Goals (5 Years of Data)     None      Follow-Up and Disposition     Return if symptoms worsen or fail to improve.      [facility] On Call     [facility] On Call Nurse Care Line -  Assistance  Registered nurses in the [facility] On Call Center provide clinical advisement, health education, appointment booking, and other advisory services.  Call for this free service at [phone].             Medications           Message regarding Medications     Verify the changes and/or additions to your medication regime listed below are the same as discussed with your clinician today.  If any of these changes or additions are incorrect, please notify your healthcare provider.        STOP taking these medications     albuterol (PROVENTIL) 2.5 mg /3 mL (0.083 %) nebulizer solution Take 4 mLs (3.3333 mg total) by nebulization every 4 (four) hours as needed for Wheezing or Shortness of Breath.           Verify that the below list of medications is an accurate representation of the medications you are currently taking.  If none reported, the list may be blank. If incorrect, please contact your healthcare provider. Carry this list with you in case of emergency.           Current Medications     acetaZOLAMIDE  (DIAMOX) 500 mg CpSR Take 500 mg by mouth 2 (two) times daily.    atorvastatin (LIPITOR) 20 MG tablet Take 1 tablet (20 mg total) by mouth once daily.    butalbital-acetaminophen-caffeine -40 mg (FIORICET, ESGIC) -40 mg per tablet TK 1 T PO Q 8 H PRF PAIN OR HEADACHES    fluticasone (FLONASE) 50 mcg/actuation nasal spray 1 spray by Each Nare route daily as needed.    fluticasone-vilanterol (BREO ELLIPTA) 200-25 mcg/dose DsDv diskus inhaler Inhale 1 puff into the lungs once daily.    gabapentin (NEURONTIN) 300 MG capsule Take 2 capsules (600 mg total) by mouth 3 (three) times daily.    lamotrigine (LAMICTAL) 25 MG tablet Take 1 tablet (25 mg total) by mouth 2 (two) times daily.    LINZESS 290 mcg Cap TK ONE CAPSULE PO D ON AN EMPTY STOMACH    losartan-hydrochlorothiazide 100-25 mg (HYZAAR) 100-25 mg per tablet Take 1 tablet by mouth once daily.    montelukast (SINGULAIR) 10 mg tablet Take 1 tablet (10 mg total) by mouth once daily.    oxycodone (ROXICODONE) 15 MG Tab Take 1 tablet (15 mg total) by mouth every 6 (six) hours as needed.    pantoprazole (PROTONIX) 40 MG tablet once daily.     primidone (MYSOLINE) 50 MG Tab Take 1 tablet (50 mg total) by mouth every evening.    TAZTIA  mg CpSR TK ONE CAPSULE PO D    topiramate (TOPAMAX) 200 MG Tab Take 1 tablet (200 mg total) by mouth 2 (two) times daily.    capsaicin 0.1 % Crea Apply 1 application topically 2 (two) times daily.    EPIPEN 2-RHODA 0.3 mg/0.3 mL (1:1,000) AtIn     FLUTICASONE/SALMETEROL (ADVAIR DISKUS INHL) Inhale into the lungs.    ipratropium (ATROVENT) 0.02 % nebulizer solution Take 2.5 mLs (500 mcg total) by nebulization 4 (four) times daily.    levalbuterol (XOPENEX) 1.25 mg/0.5 mL nebulizer solution Take 0.5 mLs (1.25 mg total) by nebulization every 4 (four) hours as needed for Wheezing.    metformin (GLUCOPHAGE-XR) 500 MG 24 hr tablet Take 1 tablet (500 mg total) by mouth once daily.    norethindrone (AYGESTIN) 5 mg Tab Take 1 tablet  "(5 mg total) by mouth once daily. TAKE D 1-10 Q MONTH    nystatin-triamcinolone (MYCOLOG) ointment Apply topically 2 (two) times daily.           Clinical Reference Information           Your Vitals Were     BP Pulse Temp Height Weight SpO2    140/88 (BP Location: Right arm, Patient Position: Sitting, BP Method: Manual) 78 98.4 °F (36.9 °C) (Oral) 5' 4" (1.626 m) 166.5 kg (367 lb 1.1 oz) 94%    BMI                63.01 kg/m2          Blood Pressure          Most Recent Value    BP  (!)  140/88      Allergies as of 2/6/2017     No Known Allergies      Immunizations Administered on Date of Encounter - 2/6/2017     None      Orders Placed During Today's Visit      Normal Orders This Visit    POCT URINE DIPSTICK WITHOUT MICROSCOPE     Future Labs/Procedures Expected by Expires    X-Ray Abdomen AP 1 View  2/6/2017 2/6/2018      [facility] Sign-Up     Activating your [facility] account is as easy as 1-2-3!     1) Visit [email or web address], select Sign Up Now, enter this activation code and your date of birth, then select Next.  [email or web address]  Expires: 2/23/2017  1:23 PM      2) Create a username and password to use when you visit [facility] in the future and select a security question in case you lose your password and select Next.    3) Enter your e-mail address and click Sign Up!    Additional Information  If you have questions, please e-mail [email or web address] or call [phone] to talk to our [facility] staff. Remember, [facility] is NOT to be used for urgent needs. For medical emergencies, dial 911.         Smoking Cessation     If you would like to quit smoking:   You may be eligible for free services if you are a [place] resident and started smoking cigarettes before September 1, 1988.  Call the Smoking Cessation [employer] (SCT) toll free at (912) 957-5277 or (888) 731-3628.   Call 8-796-QUIT-NOW if you do not meet the above criteria.            Language Assistance Services     ATTENTION: Language assistance " services are available, free of charge. Please call [phone].      ATENCIÓN: Si habla kirsty, tiene a lee disposición servicios gratuitos de asistencia lingüística. Llame al [phone].     CHÚ Ý: N?u b?n nói Ti?ng Vi?t, có các d?ch v? h? tr? ngôn ng? mi?n phí dành cho b?n. G?i s? [phone].         [facility] complies with applicable Federal civil rights laws and does not discriminate on the basis of race, color, national origin, age, disability, or sex.

## 2017-02-07 ENCOUNTER — TELEPHONE (OUTPATIENT)
Dept: FAMILY MEDICINE | Facility: CLINIC | Age: 47
End: 2017-02-07

## 2017-02-07 DIAGNOSIS — R10.9 FLANK PAIN, ACUTE: Primary | ICD-10-CM

## 2017-02-07 NOTE — TELEPHONE ENCOUNTER
Patient notified of charted report. Patient is requesting the Ultasound to be ordered.  She said that now the pain is on both sides.

## 2017-02-07 NOTE — PROGRESS NOTES
Routine Office Visit    Patient Name: Yanni Kaiser    : 1970  MRN: [id number]    Subjective:  Yanni is a 46 y.o. female who presents today for:    1. Right flank pain  Patient presenting with several days of right flank pain that is intermittent.  There has been no fevers or chills.  She denies any dysuria, increased frequency, or hematuria.  She states that the flank pain worsens when urinating.  She denies a history of kidney stones.      Past Medical History  Past Medical History   Diagnosis Date    Allergy     Asthma     Diabetes mellitus     Diabetes mellitus, type 2     GERD (gastroesophageal reflux disease)     Hypertension     Seizures     Sleep apnea        Past Surgical History  Past Surgical History   Procedure Laterality Date    Cholecystectomy      Sinus surgery      Gallbladder surgery         Family History  Family History   Problem Relation Age of Onset    Cancer Mother     Hypertension Mother     Diabetes Mother     Stroke Father     Heart disease Father     COPD Father     Heart attack Father     Cancer Maternal Grandmother        Social History  Social History     Social History    Marital status:      Spouse name: N/A    Number of children: N/A    Years of education: N/A     Occupational History    Not on file.     Social History Main Topics    Smoking status: Current Every Day Smoker     Packs/day: 1.00     Years: 15.00     Types: Cigarettes     Last attempt to quit: 6/10/2015    Smokeless tobacco: Never Used    Alcohol use No    Drug use: No    Sexual activity: Yes     Partners: Male     Birth control/ protection: None     Other Topics Concern    Not on file     Social History Narrative       Current Medications  Current Outpatient Prescriptions on File Prior to Visit   Medication Sig Dispense Refill    acetaZOLAMIDE (DIAMOX) 500 mg CpSR Take 500 mg by mouth 2 (two) times daily.      atorvastatin (LIPITOR) 20 MG tablet Take 1 tablet (20 mg  total) by mouth once daily. 90 tablet 1    butalbital-acetaminophen-caffeine -40 mg (FIORICET, ESGIC) -40 mg per tablet TK 1 T PO Q 8 H PRF PAIN OR HEADACHES  2    fluticasone (FLONASE) 50 mcg/actuation nasal spray 1 spray by Each Nare route daily as needed. 16 g 1    fluticasone-vilanterol (BREO ELLIPTA) 200-25 mcg/dose DsDv diskus inhaler Inhale 1 puff into the lungs once daily. 1 each 3    gabapentin (NEURONTIN) 300 MG capsule Take 2 capsules (600 mg total) by mouth 3 (three) times daily. 180 capsule 11    lamotrigine (LAMICTAL) 25 MG tablet Take 1 tablet (25 mg total) by mouth 2 (two) times daily. 60 tablet 11    LINZESS 290 mcg Cap TK ONE CAPSULE PO D ON AN EMPTY STOMACH  5    losartan-hydrochlorothiazide 100-25 mg (HYZAAR) 100-25 mg per tablet Take 1 tablet by mouth once daily. 90 tablet 4    montelukast (SINGULAIR) 10 mg tablet Take 1 tablet (10 mg total) by mouth once daily. 30 tablet 5    oxycodone (ROXICODONE) 15 MG Tab Take 1 tablet (15 mg total) by mouth every 6 (six) hours as needed. 40 tablet 0    pantoprazole (PROTONIX) 40 MG tablet once daily.   0    primidone (MYSOLINE) 50 MG Tab Take 1 tablet (50 mg total) by mouth every evening. 90 tablet 3    TAZTIA  mg CpSR TK ONE CAPSULE PO D  2    topiramate (TOPAMAX) 200 MG Tab Take 1 tablet (200 mg total) by mouth 2 (two) times daily. 60 tablet 11    capsaicin 0.1 % Crea Apply 1 application topically 2 (two) times daily. 56.6 g 1    EPIPEN 2-RHODA 0.3 mg/0.3 mL (1:1,000) AtIn   1    FLUTICASONE/SALMETEROL (ADVAIR DISKUS INHL) Inhale into the lungs.      ipratropium (ATROVENT) 0.02 % nebulizer solution Take 2.5 mLs (500 mcg total) by nebulization 4 (four) times daily. 100 vial 0    levalbuterol (XOPENEX) 1.25 mg/0.5 mL nebulizer solution Take 0.5 mLs (1.25 mg total) by nebulization every 4 (four) hours as needed for Wheezing. 100 each 2    metformin (GLUCOPHAGE-XR) 500 MG 24 hr tablet Take 1 tablet (500 mg total) by mouth  "once daily. 90 tablet 1    norethindrone (AYGESTIN) 5 mg Tab Take 1 tablet (5 mg total) by mouth once daily. TAKE D 1-10 Q MONTH 10 tablet 12    nystatin-triamcinolone (MYCOLOG) ointment Apply topically 2 (two) times daily. 60 g 2    [DISCONTINUED] albuterol (PROVENTIL) 2.5 mg /3 mL (0.083 %) nebulizer solution Take 4 mLs (3.3333 mg total) by nebulization every 4 (four) hours as needed for Wheezing or Shortness of Breath.  0     No current facility-administered medications on file prior to visit.        Allergies   Review of patient's allergies indicates:  No Known Allergies    Review of Systems (Pertinent positives)  Review of Systems   Constitutional: Negative.    HENT: Negative.    Eyes: Negative.    Respiratory: Negative.    Cardiovascular: Negative.    Gastrointestinal: Negative.    Genitourinary: Positive for flank pain. Negative for dysuria, frequency, hematuria and urgency.   Musculoskeletal: Negative for back pain, joint pain, myalgias and neck pain.   Skin: Negative.    Neurological: Negative.          Visit Vitals    BP (!) 140/88 (BP Location: Right arm, Patient Position: Sitting, BP Method: Manual)    Pulse 78    Temp 98.4 °F (36.9 °C) (Oral)    Ht 5' 4" (1.626 m)    Wt (!) 166.5 kg (367 lb 1.1 oz)    SpO2 (!) 94%    BMI 63.01 kg/m2       GENERAL APPEARANCE: in no apparent distress and well developed and well nourished  HEENT: PERRL, EOMI, Sclera clear, anicteric, Oropharynx clear, no lesions, Neck supple with midline trachea  NECK: normal, supple, no adenopathy, thyroid normal in size  RESPIRATORY: appears well, vitals normal, no respiratory distress, acyanotic, normal RR, chest clear, no wheezing, crepitations, rhonchi, normal symmetric air entry  HEART: regular rate and rhythm, S1, S2 normal, no murmur, click, rub or gallop.    ABDOMEN: abdomen is soft without tenderness, no masses, no hernias, no organomegaly, no rebound, no guarding. Suprapubic tenderness absent. No CVA tenderness.  SKIN: " no rashes, no wounds, no other lesions  PSYCH: Alert, oriented x 3, thought content appropriate, speech normal, pleasant and cooperative, good eye contact, well groomed    Assessment/Plan:  Yanni Kaiser is a 46 y.o. female who presents today for :    Yanni was seen today for foot pain and back pain.    Diagnoses and all orders for this visit:    Right flank pain  -     POCT URINE DIPSTICK WITHOUT MICROSCOPE  -     X-Ray Abdomen AP 1 View; Future      1.  Trace urine seen on urine dip  2.  KUB negative for evidence of stone  3.  Patient to call if symptoms persist  4.  Follow up as needed    Carlyle Gordillo MD

## 2017-02-07 NOTE — TELEPHONE ENCOUNTER
----- Message from Carlyle Gordillo MD sent at 2/7/2017  3:06 PM CST -----  Please let patient know that labs are negative.  If she continues to have flank pain, she may need an ultrasound.    Thanks,  Dr. Gordillo

## 2017-02-13 ENCOUNTER — HOSPITAL ENCOUNTER (OUTPATIENT)
Dept: RADIOLOGY | Facility: HOSPITAL | Age: 47
Discharge: HOME | End: 2017-02-13
Attending: FAMILY MEDICINE
Payer: MEDICARE

## 2017-02-13 DIAGNOSIS — R10.9 FLANK PAIN, ACUTE: ICD-10-CM

## 2017-02-13 PROCEDURE — 76770 US EXAM ABDO BACK WALL COMP: CPT | Mod: 26,,, | Performed by: RADIOLOGY

## 2017-02-13 PROCEDURE — 76770 US EXAM ABDO BACK WALL COMP: CPT | Mod: TC

## 2017-02-13 RX ORDER — METFORMIN HYDROCHLORIDE 500 MG/1
TABLET, EXTENDED RELEASE ORAL
Qty: 90 TABLET | Refills: 0 | Status: SHIPPED | OUTPATIENT
Start: 2017-02-13 | End: 2017-03-09 | Stop reason: SDUPTHER

## 2017-02-13 RX ORDER — GABAPENTIN 300 MG/1
CAPSULE ORAL
Qty: 180 CAPSULE | Refills: 0 | Status: SHIPPED | OUTPATIENT
Start: 2017-02-13 | End: 2017-02-21

## 2017-02-14 ENCOUNTER — TELEPHONE (OUTPATIENT)
Dept: FAMILY MEDICINE | Facility: CLINIC | Age: 47
End: 2017-02-14

## 2017-02-14 NOTE — TELEPHONE ENCOUNTER
----- Message from Carlyle Gordillo MD sent at 2/14/2017  8:13 AM CST -----  Please let patient know that her ultrasound was negative for any issues with her kidney.    Thanks,  Dr. Gordillo

## 2017-02-15 ENCOUNTER — OFFICE VISIT (OUTPATIENT)
Dept: NEUROLOGY | Facility: CLINIC | Age: 47
End: 2017-02-15
Payer: MEDICARE

## 2017-02-15 VITALS
WEIGHT: 293 LBS | HEART RATE: 83 BPM | SYSTOLIC BLOOD PRESSURE: 144 MMHG | HEIGHT: 64 IN | BODY MASS INDEX: 50.02 KG/M2 | DIASTOLIC BLOOD PRESSURE: 91 MMHG

## 2017-02-15 DIAGNOSIS — G93.2 IIH (IDIOPATHIC INTRACRANIAL HYPERTENSION): Primary | ICD-10-CM

## 2017-02-15 PROCEDURE — 3077F SYST BP >= 140 MM HG: CPT | Mod: S$GLB,,, | Performed by: PSYCHIATRY & NEUROLOGY

## 2017-02-15 PROCEDURE — 99499 UNLISTED E&M SERVICE: CPT | Mod: S$PBB,,, | Performed by: PSYCHIATRY & NEUROLOGY

## 2017-02-15 PROCEDURE — 99999 PR PBB SHADOW E&M-EST. PATIENT-LVL III: CPT | Mod: PBBFAC,,, | Performed by: PSYCHIATRY & NEUROLOGY

## 2017-02-15 PROCEDURE — 99213 OFFICE O/P EST LOW 20 MIN: CPT | Mod: S$GLB,,, | Performed by: PSYCHIATRY & NEUROLOGY

## 2017-02-15 PROCEDURE — 3080F DIAST BP >= 90 MM HG: CPT | Mod: S$GLB,,, | Performed by: PSYCHIATRY & NEUROLOGY

## 2017-02-15 NOTE — MR AVS SNAPSHOT
[facility] - Neurology  [address]  [facility] [address]  Phone: [phone]  Fax: [phone]                  Yanni Kaiser   2/15/2017 9:40 AM   Office Visit    Description:  Female : 1970   Provider:  Riccardo Moore MD   Department:  [facility]           Reason for Visit     Consult                To Do List           Future Appointments        Provider Department Dept Phone    2017 8:30 AM Rosalva Taylor DPM [facility] - Podiatry [phone]    3/24/2017 10:30 AM Pj Harrell MD [facility] - Sleep Clinic [phone]      Goals (5 Years of Data)     None      Follow-Up and Disposition     Return in about 3 months (around 5/15/2017).      [facility]     [facility] On Call Nurse [facility] -  Assistance  Registered nurses in the [facility] On Call Center provide clinical advisement, health education, appointment booking, and other advisory services.  Call for this free service at [phone].             Medications           Message regarding Medications     Verify the changes and/or additions to your medication regime listed below are the same as discussed with your clinician today.  If any of these changes or additions are incorrect, please notify your healthcare provider.             Verify that the below list of medications is an accurate representation of the medications you are currently taking.  If none reported, the list may be blank. If incorrect, please contact your healthcare provider. Carry this list with you in case of emergency.           Current Medications     acetaZOLAMIDE (DIAMOX) 500 mg CpSR Take 500 mg by mouth 2 (two) times daily.    atorvastatin (LIPITOR) 20 MG tablet Take 1 tablet (20 mg total) by mouth once daily.    butalbital-acetaminophen-caffeine -40 mg (FIORICET, ESGIC) -40 mg per tablet TK 1 T PO Q 8 H PRF PAIN OR HEADACHES    capsaicin 0.1 % Crea Apply 1 application topically 2 (two) times daily.    EPIPEN 2-RHODA 0.3  "mg/0.3 mL (1:1,000) AtIn     fluticasone (FLONASE) 50 mcg/actuation nasal spray 1 spray by Each Nare route daily as needed.    fluticasone-vilanterol (BREO ELLIPTA) 200-25 mcg/dose DsDv diskus inhaler Inhale 1 puff into the lungs once daily.    FLUTICASONE/SALMETEROL (ADVAIR DISKUS INHL) Inhale into the lungs.    gabapentin (NEURONTIN) 300 MG capsule Take 2 capsules (600 mg total) by mouth 3 (three) times daily.    gabapentin (NEURONTIN) 300 MG capsule TAKE 2 CAPSULES BY MOUTH THREE TIMES DAILY    ipratropium (ATROVENT) 0.02 % nebulizer solution Take 2.5 mLs (500 mcg total) by nebulization 4 (four) times daily.    lamotrigine (LAMICTAL) 25 MG tablet Take 1 tablet (25 mg total) by mouth 2 (two) times daily.    levalbuterol (XOPENEX) 1.25 mg/0.5 mL nebulizer solution Take 0.5 mLs (1.25 mg total) by nebulization every 4 (four) hours as needed for Wheezing.    LINZESS 290 mcg Cap TK ONE CAPSULE PO D ON AN EMPTY STOMACH    losartan-hydrochlorothiazide 100-25 mg (HYZAAR) 100-25 mg per tablet Take 1 tablet by mouth once daily.    metformin (GLUCOPHAGE-XR) 500 MG 24 hr tablet TAKE 1 TABLET BY MOUTH DAILY WITH BREAKFAST    montelukast (SINGULAIR) 10 mg tablet Take 1 tablet (10 mg total) by mouth once daily.    oxycodone (ROXICODONE) 15 MG Tab Take 1 tablet (15 mg total) by mouth every 6 (six) hours as needed.    pantoprazole (PROTONIX) 40 MG tablet once daily.     primidone (MYSOLINE) 50 MG Tab Take 1 tablet (50 mg total) by mouth every evening.    TAZTIA  mg CpSR TK ONE CAPSULE PO D    topiramate (TOPAMAX) 200 MG Tab Take 1 tablet (200 mg total) by mouth 2 (two) times daily.    norethindrone (AYGESTIN) 5 mg Tab Take 1 tablet (5 mg total) by mouth once daily. TAKE D 1-10 Q MONTH    nystatin-triamcinolone (MYCOLOG) ointment Apply topically 2 (two) times daily.           Clinical Reference Information           Your Vitals Were     BP Pulse Height Weight BMI    144/91 83 5' 4" (1.626 m) 165.2 kg (364 lb 3.2 oz) 62.51 " kg/m2      Blood Pressure          Most Recent Value    BP  (!)  144/91      Allergies as of 2/15/2017     No Known Allergies      Immunizations Administered on Date of Encounter - 2/15/2017     None      [facility] Sign-Up     Activating your [facility] account is as easy as 1-2-3!     1) Visit [email or web address], select Sign Up Now, enter this activation code and your date of birth, then select Next.  [email or web address]  Expires: 2/23/2017  1:23 PM      2) Create a username and password to use when you visit [facility] in the future and select a security question in case you lose your password and select Next.    3) Enter your e-mail address and click Sign Up!    Additional Information  If you have questions, please e-mail [email or web address] or call [phone] to talk to our [facility] staff. Remember, [facility] is NOT to be used for urgent needs. For medical emergencies, dial 911.         Smoking Cessation     If you would like to quit smoking:   You may be eligible for free services if you are a [place] resident and started smoking cigarettes before September 1, 1988.  Call the Smoking Cessation Trust ([facility]) toll free at [phone] or [phone].   Call 2-080-QUIT-NOW if you do not meet the above criteria.            Language Assistance Services     ATTENTION: Language assistance services are available, free of charge. Please call [phone].      ATENCIÓN: Si habla español, tiene a lee disposición servicios gratuitos de asistencia lingüística. Llame al [phone].     CHÚ Ý: N?u b?n nói Ti?ng Vi?t, có các d?ch v? h? tr? ngôn ng? mi?n phí dành cho b?n. G?i s? [phone].         [facility] complies with applicable Federal civil rights laws and does not discriminate on the basis of race, color, national origin, age, disability, or sex.

## 2017-02-15 NOTE — LETTER
February 15, 2017      Carlyle Gordillo MD  [address] [place] [address]           [facility]  [address]  [place] [address]  Phone: [phone]  Fax: [phone]          Patient: Yanni Kaiser   MR Number: [id number]   YOB: 1970   Date of Visit: 2/15/2017       Dear Dr. Carlyle KELLY Page:    Thank you for referring Yanni Kaiser to me for evaluation. Attached you will find relevant portions of my assessment and plan of care.    If you have questions, please do not hesitate to call me. I look forward to following Yanni Kaiser along with you.    Sincerely,    Riccardo Moore MD    Enclosure  CC:  No Recipients    If you would like to receive this communication electronically, please contact [email or web address] or [phone] to request more information on [facility] Link access.    For providers and/or their staff who would like to refer a patient to [facility], please contact us through our one-stop-shop provider referral line, [facility], at [phone].    If you feel you have received this communication in error or would no longer like to receive these types of communications, please e-mail [email or web address]

## 2017-02-15 NOTE — PROGRESS NOTES
[facility] - NEUROLOGY  [facility]    Date: February 15, 2017   Patient Name: Yanni Kaiser   MRN: [id number]   PCP: Carlyle Gordillo  Referring Provider: Carlyle Gordillo MD    Assessment:      This is Yanni Kaiser, 46 y.o. female with morbid obesity, history of IIH, and seizures.  LP 1/2017 with OP 14 and 3/2016 with OP 24, recent optho exam without evidence of ongoing increased ICP.  IIH does not appear to be an active problem despite ongoing daily headache.  We discussed the importance of weight loss and possible bariatric surgery given her history but she declined referral.  Her seizure history has not been clearly documented, would recommend EMU admit for characterization should symptoms change.      Plan:      Continue current meds, would not further adjust until sleep issues addressed- consider stopping LTG in the future  Referral for trigger point injections  Continue ophtho appointments for serial OCT to monitor symptoms     Return in 3 months       I discussed side effects of the medications. I asked the patient to  stop the medication if She notices serious adverse effects as we discussed and to seek immediate medical attention at an ER.     Riccardo Moore MD  [facility]   Department of Neurology    Subjective:   Baseline 5/10 headache with L>R shooting pains and photophobia.  Currently taking diamox, TPM, GBP, LTG although not sure of doses.  Endorses poor sleep due to frequent awakenings to urinate    Reports initial seizure was GTC on Julian Gr[place] day 2002.  Reports GTC typically occur from sleep but none in over a year.  Reports staring spells but does not know how frequent they.  Previously on keppra.    PAST MEDICAL HISTORY:  Past Medical History   Diagnosis Date    Allergy     Asthma     Diabetes mellitus     Diabetes mellitus, type 2     GERD (gastroesophageal reflux disease)     Hypertension     Seizures     Sleep apnea         PAST SURGICAL HISTORY:  Past Surgical History   Procedure Laterality Date    Cholecystectomy      Sinus surgery      Gallbladder surgery         CURRENT MEDS:  Current Outpatient Prescriptions   Medication Sig Dispense Refill    acetaZOLAMIDE (DIAMOX) 500 mg CpSR Take 500 mg by mouth 2 (two) times daily.      atorvastatin (LIPITOR) 20 MG tablet Take 1 tablet (20 mg total) by mouth once daily. 90 tablet 1    butalbital-acetaminophen-caffeine -40 mg (FIORICET, ESGIC) -40 mg per tablet TK 1 T PO Q 8 H PRF PAIN OR HEADACHES  2    capsaicin 0.1 % Crea Apply 1 application topically 2 (two) times daily. 56.6 g 1    EPIPEN 2-RHODA 0.3 mg/0.3 mL (1:1,000) AtIn   1    fluticasone (FLONASE) 50 mcg/actuation nasal spray 1 spray by Each Nare route daily as needed. 16 g 1    fluticasone-vilanterol (BREO ELLIPTA) 200-25 mcg/dose DsDv diskus inhaler Inhale 1 puff into the lungs once daily. 1 each 3    FLUTICASONE/SALMETEROL (ADVAIR DISKUS INHL) Inhale into the lungs.      gabapentin (NEURONTIN) 300 MG capsule Take 2 capsules (600 mg total) by mouth 3 (three) times daily. 180 capsule 11    gabapentin (NEURONTIN) 300 MG capsule TAKE 2 CAPSULES BY MOUTH THREE TIMES DAILY 180 capsule 0    ipratropium (ATROVENT) 0.02 % nebulizer solution Take 2.5 mLs (500 mcg total) by nebulization 4 (four) times daily. 100 vial 0    lamotrigine (LAMICTAL) 25 MG tablet Take 1 tablet (25 mg total) by mouth 2 (two) times daily. 60 tablet 11    levalbuterol (XOPENEX) 1.25 mg/0.5 mL nebulizer solution Take 0.5 mLs (1.25 mg total) by nebulization every 4 (four) hours as needed for Wheezing. 100 each 2    LINZESS 290 mcg Cap TK ONE CAPSULE PO D ON AN EMPTY STOMACH  5    losartan-hydrochlorothiazide 100-25 mg (HYZAAR) 100-25 mg per tablet Take 1 tablet by mouth once daily. 90 tablet 4    metformin (GLUCOPHAGE-XR) 500 MG 24 hr tablet TAKE 1 TABLET BY MOUTH DAILY WITH BREAKFAST 90 tablet 0    montelukast (SINGULAIR) 10 mg  "tablet Take 1 tablet (10 mg total) by mouth once daily. 30 tablet 5    oxycodone (ROXICODONE) 15 MG Tab Take 1 tablet (15 mg total) by mouth every 6 (six) hours as needed. 40 tablet 0    pantoprazole (PROTONIX) 40 MG tablet once daily.   0    primidone (MYSOLINE) 50 MG Tab Take 1 tablet (50 mg total) by mouth every evening. 90 tablet 3    TAZTIA  mg CpSR TK ONE CAPSULE PO D  2    topiramate (TOPAMAX) 200 MG Tab Take 1 tablet (200 mg total) by mouth 2 (two) times daily. 60 tablet 11    norethindrone (AYGESTIN) 5 mg Tab Take 1 tablet (5 mg total) by mouth once daily. TAKE D 1-10 Q MONTH 10 tablet 12    nystatin-triamcinolone (MYCOLOG) ointment Apply topically 2 (two) times daily. 60 g 2     No current facility-administered medications for this visit.        ALLERGIES:  Review of patient's allergies indicates:  No Known Allergies    FAMILY HISTORY:  Family History   Problem Relation Age of Onset    Cancer Mother     Hypertension Mother     Diabetes Mother     Stroke Father     Heart disease Father     COPD Father     Heart attack Father     Cancer Maternal Grandmother        SOCIAL HISTORY:  Social History   Substance Use Topics    Smoking status: Current Every Day Smoker     Packs/day: 1.00     Years: 15.00     Types: Cigarettes     Last attempt to quit: 6/10/2015    Smokeless tobacco: Never Used    Alcohol use No       Review of Systems:  12 review of systems is negative except for the symptoms mentioned in HPI.        Objective:     Vitals:    02/15/17 0914   BP: (!) 144/91   Pulse: 83   Weight: (!) 165.2 kg (364 lb 3.2 oz)   Height: 5' 4" (1.626 m)       General: NAD, well nourished   Eyes: no tearing, discharge, no erythema   ENT: moist mucous membranes of the oral cavity, nares patent    Neck: Supple, full range of motion  Cardiovascular: Warm and well perfused, pulses equal and symmetrical  Lungs: Normal work of breathing, normal chest wall excursions  Skin: No rash, lesions, or breakdown " on exposed skin  Psychiatry: frequently closes eyes, appears very apathetic   Abdomen: soft, non tender, non distended  Extremeties: No cyanosis, clubbing or edema.    Neurological   MENTAL STATUS: Alert and oriented to person, place, and time. Attention and concentration within normal limits. Speech without dysarthria, able to name and repeat without difficulty. Recent and remote memory within normal limits   CRANIAL NERVES: Visual fields intact. PERRL. EOMI. Facial sensation intact. Face symmetrical. Hearing grossly intact. Full shoulder shrug bilaterally. Tongue protrudes midline   SENSORY: Sensation is intact to light touch throughout.  Joint position perception intact. Negative Romberg.   MOTOR: Normal bulk and tone. No pronator drift.  Give-away weakness throughout but 5/5 deltoid, biceps, triceps, interosseous, hand  bilaterally. 5/5 iliopsoas, knee extension/flexion, foot dorsi/plantarflexion bilaterally.    CEREBELLAR/COORDINATION/GAIT: Gait steady with normal arm swing and stride length.

## 2017-02-20 ENCOUNTER — OFFICE VISIT (OUTPATIENT)
Dept: PODIATRY | Facility: CLINIC | Age: 47
DRG: 638 | End: 2017-02-20
Payer: MEDICARE

## 2017-02-20 VITALS — WEIGHT: 293 LBS | HEIGHT: 64 IN | BODY MASS INDEX: 50.02 KG/M2

## 2017-02-20 DIAGNOSIS — M20.5X9 HALLUX LIMITUS, UNSPECIFIED LATERALITY: ICD-10-CM

## 2017-02-20 DIAGNOSIS — M79.671 FOOT PAIN, BILATERAL: ICD-10-CM

## 2017-02-20 DIAGNOSIS — M72.2 PLANTAR FASCIITIS: ICD-10-CM

## 2017-02-20 DIAGNOSIS — M21.42 PES PLANUS OF BOTH FEET: ICD-10-CM

## 2017-02-20 DIAGNOSIS — M21.41 PES PLANUS OF BOTH FEET: ICD-10-CM

## 2017-02-20 DIAGNOSIS — E11.9 CONTROLLED TYPE 2 DIABETES MELLITUS WITHOUT COMPLICATION, WITHOUT LONG-TERM CURRENT USE OF INSULIN: Primary | ICD-10-CM

## 2017-02-20 DIAGNOSIS — M79.672 FOOT PAIN, BILATERAL: ICD-10-CM

## 2017-02-20 DIAGNOSIS — M20.41 HAMMER TOES OF BOTH FEET: ICD-10-CM

## 2017-02-20 DIAGNOSIS — M20.42 HAMMER TOES OF BOTH FEET: ICD-10-CM

## 2017-02-20 PROCEDURE — 20600 DRAIN/INJ JOINT/BURSA W/O US: CPT | Mod: S$GLB,,, | Performed by: PODIATRIST

## 2017-02-20 PROCEDURE — 1160F RVW MEDS BY RX/DR IN RCRD: CPT | Mod: S$GLB,,, | Performed by: PODIATRIST

## 2017-02-20 PROCEDURE — 4010F ACE/ARB THERAPY RXD/TAKEN: CPT | Mod: S$GLB,,, | Performed by: PODIATRIST

## 2017-02-20 PROCEDURE — 99999 PR PBB SHADOW E&M-EST. PATIENT-LVL III: CPT | Mod: PBBFAC,,, | Performed by: PODIATRIST

## 2017-02-20 PROCEDURE — 99499 UNLISTED E&M SERVICE: CPT | Mod: S$PBB,,, | Performed by: PODIATRIST

## 2017-02-20 PROCEDURE — 3044F HG A1C LEVEL LT 7.0%: CPT | Mod: S$GLB,,, | Performed by: PODIATRIST

## 2017-02-20 PROCEDURE — 99213 OFFICE O/P EST LOW 20 MIN: CPT | Mod: 25,S$GLB,, | Performed by: PODIATRIST

## 2017-02-20 RX ADMIN — TRIAMCINOLONE ACETONIDE 40 MG: 40 INJECTION, SUSPENSION INTRA-ARTICULAR; INTRAMUSCULAR at 09:02

## 2017-02-20 RX ADMIN — BUPIVACAINE HYDROCHLORIDE 7.5 MG: 5 INJECTION, SOLUTION EPIDURAL; INTRACAUDAL at 09:02

## 2017-02-20 RX ADMIN — DEXAMETHASONE SODIUM PHOSPHATE 4 MG: 4 INJECTION, SOLUTION INTRA-ARTICULAR; INTRALESIONAL; INTRAMUSCULAR; INTRAVENOUS; SOFT TISSUE at 09:02

## 2017-02-20 RX ADMIN — LIDOCAINE HYDROCHLORIDE 30 MG: 20 INJECTION, SOLUTION EPIDURAL; INFILTRATION; INTRACAUDAL; PERINEURAL at 09:02

## 2017-02-20 NOTE — MR AVS SNAPSHOT
[facility]  [address] [facility]  [place] [address]  Phone: [phone]                  Yanni Kaiser   2017 8:30 AM   Office Visit    Description:  Female : 1970   Provider:  Rosalva Taylor DPM   Department:  [facility]           Reason for Visit     Diabetes Mellitus     Diabetic Foot Exam     Nail Care     Foot Pain           Diagnoses this Visit        Comments    Controlled type 2 diabetes mellitus without complication, without long-term current use of insulin    -  Primary     Foot pain, bilateral         Pes planus of both feet         Plantar fasciitis         Hallux limitus, unspecified laterality         Hammer toes of both feet                To Do List           Future Appointments        Provider Department Dept Phone    2017 9:00 AM Carlyle Gordillo MD [facility] [phone]    2017 8:00 AM MICA Chowdhury [facility] Neurology [phone]    3/24/2017 10:30 AM Pj Harrell MD [facility] [phone]    5/15/2017 9:40 AM Riccardo Moore MD [facility] Neurology [phone]      Goals (5 Years of Data)     None      [facility]     [facility] On Call Nurse [facility] -  Assistance  Registered nurses in the [facility] Call Center provide clinical advisement, health education, appointment booking, and other advisory services.  Call for this free service at [phone].             Medications           Message regarding Medications     Verify the changes and/or additions to your medication regime listed below are the same as discussed with your clinician today.  If any of these changes or additions are incorrect, please notify your healthcare provider.             Verify that the below list of medications is an accurate representation of the medications you are currently taking.  If none reported, the list may be blank. If incorrect, please contact your healthcare provider. Carry this list with you in  case of emergency.           Current Medications     acetaZOLAMIDE (DIAMOX) 500 mg CpSR Take 500 mg by mouth 2 (two) times daily.    atorvastatin (LIPITOR) 20 MG tablet Take 1 tablet (20 mg total) by mouth once daily.    butalbital-acetaminophen-caffeine -40 mg (FIORICET, ESGIC) -40 mg per tablet TK 1 T PO Q 8 H PRF PAIN OR HEADACHES    capsaicin 0.1 % Crea Apply 1 application topically 2 (two) times daily.    EPIPEN 2-RHODA 0.3 mg/0.3 mL (1:1,000) AtIn     fluticasone (FLONASE) 50 mcg/actuation nasal spray 1 spray by Each Nare route daily as needed.    fluticasone-vilanterol (BREO ELLIPTA) 200-25 mcg/dose DsDv diskus inhaler Inhale 1 puff into the lungs once daily.    FLUTICASONE/SALMETEROL (ADVAIR DISKUS INHL) Inhale into the lungs.    gabapentin (NEURONTIN) 300 MG capsule Take 2 capsules (600 mg total) by mouth 3 (three) times daily.    gabapentin (NEURONTIN) 300 MG capsule TAKE 2 CAPSULES BY MOUTH THREE TIMES DAILY    ipratropium (ATROVENT) 0.02 % nebulizer solution Take 2.5 mLs (500 mcg total) by nebulization 4 (four) times daily.    lamotrigine (LAMICTAL) 25 MG tablet Take 1 tablet (25 mg total) by mouth 2 (two) times daily.    levalbuterol (XOPENEX) 1.25 mg/0.5 mL nebulizer solution Take 0.5 mLs (1.25 mg total) by nebulization every 4 (four) hours as needed for Wheezing.    LINZESS 290 mcg Cap TK ONE CAPSULE PO D ON AN EMPTY STOMACH    losartan-hydrochlorothiazide 100-25 mg (HYZAAR) 100-25 mg per tablet Take 1 tablet by mouth once daily.    metformin (GLUCOPHAGE-XR) 500 MG 24 hr tablet TAKE 1 TABLET BY MOUTH DAILY WITH BREAKFAST    montelukast (SINGULAIR) 10 mg tablet Take 1 tablet (10 mg total) by mouth once daily.    oxycodone (ROXICODONE) 15 MG Tab Take 1 tablet (15 mg total) by mouth every 6 (six) hours as needed.    pantoprazole (PROTONIX) 40 MG tablet once daily.     primidone (MYSOLINE) 50 MG Tab Take 1 tablet (50 mg total) by mouth every evening.    TAZTIA  mg CpSR TK ONE CAPSULE PO D  "   topiramate (TOPAMAX) 200 MG Tab Take 1 tablet (200 mg total) by mouth 2 (two) times daily.    norethindrone (AYGESTIN) 5 mg Tab Take 1 tablet (5 mg total) by mouth once daily. TAKE D 1-10 Q MONTH    nystatin-triamcinolone (MYCOLOG) ointment Apply topically 2 (two) times daily.           Clinical Reference Information           Your Vitals Were     Height Weight BMI          5' 4" (1.626 m) 165.1 kg (364 lb) 62.48 kg/m2        Allergies as of 2/20/2017     No Known Allergies      Immunizations Administered on Date of Encounter - 2/20/2017     None      Orders Placed During Today's Visit      Normal Orders This Visit    Ambulatory Referral to Physical/Occupational Therapy     DIABETIC SHOES FOR HOME USE       [facility] Sign-Up     Activating your [facility] account is as easy as 1-2-3!     1) Visit [email or web address], select Sign Up Now, enter this activation code and your date of birth, then select Next.  [email or web address]  Expires: 2/23/2017  1:23 PM      2) Create a username and password to use when you visit [facility] in the future and select a security question in case you lose your password and select Next.    3) Enter your e-mail address and click Sign Up!    Additional Information  If you have questions, please e-mail [email or web address] or call [phone] to talk to our [facility] staff. Remember, [facility] is NOT to be used for urgent needs. For medical emergencies, dial 911.         Instructions    Recommend lotions: eucerin, aquaphor, A&D ointment, gold bond for diabetics, sween    Shoe recommendations: (try 6pm.com, zappos.com , nordstromrack.com, or shoes.[facility] for discounted prices) you can visit DSW shoes in [place] as well    Asics (GT 1000 or gel foundations), new balance, saucony (stabil c3),  Gray (transcend), vionic, propet (tennis shoe)    soft brand, clarks, crocs, aerosoles, naturalizers, SAS, ecco, pearl, donna lew, rockports (dress shoes)    Vijustyn, volitiles, burkenstocks, fitflops, propet " (sandals)    Loye comfort thong sandals, crocs (house shoes)    Nail Home remedy:  Vicks Vapor rub OR Listerine and apple cider vinegar in a spray bottle to nails    Occasional soaks for 15-20 mins in luke warm water with 1 cup of listerine and 1 cup of apple cider vinegar are ok You may add several drops of oil of oregano or tea tree oil as well      Understanding Plantar Fasciitis    Plantar fasciitis is a condition that causes foot and heel pain. The plantar fascia is a tough band of tissue that runs across the bottom of the foot from the heel to the toes. This tissue pulls on the heel bone. It supports the arch of the foot as it pushes off the ground. If the tissue becomes irritated or red and swollen (inflamed), it is called plantar fasciitis.  How to say it  PLAN-tuhr fa-see-IY-tis   What causes plantar fasciitis?  Plantar fasciitis most often occurs from overusing the plantar fascia. The tissue may become damaged from activities that put repeated stress on the heel and foot. Or it may wear down over time with age and ankle stiffness. You are more likely to have plantar fasciitis if you:  · Do activities that require a lot of running, jumping, or dancing  · Have a job that requires being on your feet for long periods  · Are overweight or obese  · Have certain foot problems, such as a tight Achilles tendon, flat feet, or high arches  · Often wear poorly fitting shoes  Symptoms of plantar fasciitis  The condition most often causes pain in the heel and the bottom of the foot. The pain may occur when you take your first steps in the morning. It may get better as you walk throughout the day. But as you continue to put weight on the foot, the pain often returns. Pain may also occur after standing or sitting for long periods.  Treating plantar fasciitis  Treatments for plantar fasciitis include:  · Resting the foot. This involves limiting movements that make your foot hurt. You may also need to avoid certain sports  and types of work for a time.  · Using cold packs. Put an ice pack on the heel and foot to help reduce pain and swelling.  · Taking pain medicines. Prescription and over-the-counter pain medicines can help relieve pain and swelling.  · Using heel cups or foot inserts (orthotics). These are placed in the shoes to help support the heel or arch and cushion the heel. You may also be told to buy proper-fitting shoes with good arch support and cushioned soles.  · Taping the foot. This supports the arch and limits the movement of the plantar fascia to help relieve symptoms.  · Wearing a night splint. This stretches the plantar fascia and leg muscles while you sleep. This may help relieve pain.  · Doing exercises and physical therapy. These stretch and strengthen the plantar fascia and the muscles in the leg that support the heel and foot.  · Getting shots of medicine into the foot. These may help relieve symptoms for a time.  · Having surgery. This may be needed if other treatments fail to relieve symptoms. During surgery, the surgeon may partially cut the plantar fascia to release tension.  Possible complications of plantar fasciitis  Without proper care and treatment, healing may take longer than normal. Also, symptoms may continue or get worse. Over time, the plantar fascia may be damaged. This can make it hard to walk or even stand without pain.  When to call your healthcare provider  Call your healthcare provider right away if you have any of these:  · Fever of 100.4°F (38°C) or higher, or as directed  · Symptoms that dont get better with treatment, or get worse  · New symptoms, such as numbness, tingling, or weakness in the foot   © 5376-7395 [employer]. [address] [place] [address], [place] [address]. All rights reserved. This information is not intended as a substitute for professional medical care. Always follow your healthcare professional's instructions.        Treating Plantar Fasciitis  First, your  healthcare provider tries to determine the cause of your problem in order to suggest ways to relieve pain. If your pain is due to poor foot mechanics, custom-made shoe inserts (orthoses) may help.    Reduce symptoms  · To relieve mild symptoms, try aspirin, ibuprofen, or other medicines as directed. Rubbing ice on the affected area may also help.  · To reduce severe pain and swelling, your healthcare provider may prescribe pills or injections or a walking cast in some instances. Physical therapy, such as ultrasound or a daily stretching program, may also be recommended. Surgery is rarely required.  · To reduce symptoms caused by poor foot mechanics, your foot may be taped. This supports the arch and temporarily controls movement. Night splints may also help by stretching the fascia.  Control movement  If taping helps, your healthcare provider may prescribe orthoses. Built from plaster casts of your feet, these inserts control the way your foot moves. As a result, your symptoms should go away.  Reduce overuse  Every time your foot strikes the ground, the plantar fascia is stretched. You can reduce the strain on the plantar fascia and the possibility of overuse by following these suggestions:  · Lose any excess weight.  · Avoid running on hard or uneven ground.  · Use orthoses at all times in your shoes and house slippers.  If surgery is needed  Your healthcare provider may consider surgery if other types of treatment don't control your pain. During surgery, the plantar fascia is partially cut to release tension. As you heal, fibrous tissue fills the space between the heel bone and the plantar fascia.   © 3167-8935 [employer]. [address] [place] [address], [place] [address]. All rights reserved. This information is not intended as a substitute for professional medical care. Always follow your healthcare professional's instructions.        Wearing Proper Shoes                    You walk on your feet every  day, forcing them to support the weight of your body. Repeated stress on your feet can cause damage over time. The right shoes can help protect your feet. The wrong shoes can cause more foot problems. Read the information below to help you find a shoe that fits your foot needs.     A good shoe fit will cover your foot outline.       A shoe that doesnt cover the outline is a bad fit.      Whats Your Foot Shape?  To get a good fit, you need to know the shape of your foot. Do this simple test: While standing, place your foot on a piece of paper and trace around it. Is your foot straight or curved? Do you have a foot problem, such as a bunion, that causes your foot outline to show a bulge on the side of your big toe?  Finding Your Fit  Bring your foot outline to the shore store to help you find the right shoe. Place a shoe you like on top of the outline to see if it matches the shape. The shoe should cover the outline. (If you have a bunion, the shoe may not cover the bulge on the outline. Look for soft leather shoes to stretch over the bunion.) Once youve found a pair of proper shoes, put them on. Walk around. Be sure the shoes dont rub or pinch. If the shoes feel good, youve found your fit!  The Right Shoe for You  A good shoe has features that provide comfort and support. It must also be the right size and shape for your feet. Look for a shoe made of breathable fabric and lining, such as leather or canvas. Be sure that shoes have enough tread to prevent slipping. Go to a good shoe store for help finding the right shoe.  Good Shoe Features  An ideal shoe has the following:  · Laces for support. If tying laces is a problem for you, try shoes with Velcro fasteners or jenna.  · A front of the shoe (toe box) with ½ inch space in front of your longest toes.  · An arch shape that supports your foot.  · No more than 1½ inches of heel.  · A stiff, snug back of the shoe to keep your foot from sliding around.  · A smooth  lining with no rough seams.  Shoe Shopping Tips  Below are some dos and donts for when you go to the shoe store.  Do:  · Select the shoes that feel right. Wear them around the house. Then bring them to your foot doctor to check for fit. If they dont fit well, return them.  · Shop late in the day, when your feet will be slightly bigger.  · Each time you buy shoes, have both your feet measured while you are standing. Foot size changes with time.  · Pick shoes to suit their purpose. High heels are okay for an occasional night on the town. But for everyday wear, choose a more sensible shoe.  · Try on shoes while wearing any inserts specially made for your feet (orthoses).  · Try on both the right and left shoes. If your feet are different sizes, pick a pair that fits the larger foot.  Dont:  · Dont buy shoes based on shoe size alone. Always try on shoes, as sizes differ from brand to brand and within brands.  · Dont expect shoes to break in. If they dont fit at the store, dont buy them.  · Dont buy a shoe that doesnt match your foot shape.  What About Socks?  Always wear socks with shoes. Socks help absorb sweat and reduce friction and blistering. When shopping for shoes, choose soft, padded socks with seams that dont irritate your feet.  If You Have Foot Problems  Some foot problems cause deformities. This can make it hard to find a good fit. Look for shoes made of soft leather to stretch over the deformity. If you have bunions, buy shoes with a wider toe box. To fit hammertoes, look for shoes with a tall toe box. If you have arch problems, you may need inserts. In some cases, youll need to have custom footwear or orthoses made for your feet.  Suggested Footwear  Ask your healthcare provider what kind of footwear you need. He or she may recommend a certain brand or shoe store.  © 3475-1793 [employer]. [address] [place] [address], [place] [address]. All rights reserved. This information is not  intended as a substitute for professional medical care. Always follow your healthcare professional's instructions.        Toe Extension (Flexibility)    These instructions are for your right foot. Switch sides for your left foot.  1. Sit in a chair. Rest your right ankle on your left knee.  2. Hold your toes with your right hand. Gently bend the toes backward. Feel a stretch in the undersides of the toes and ball of the foot. Hold for 30 to 60 seconds.  3. Then gently bend the toes in the other direction. Gently press on them until your foot is pointed. Hold for 30 to 60 seconds.  4. Repeat 5 times, or as instructed.  © 1147-7271 [employer]. [address] [place] [address], [place] [address]. All rights reserved. This information is not intended as a substitute for professional medical care. Always follow your healthcare professional's instructions.               Smoking Cessation     If you would like to quit smoking:   You may be eligible for free services if you are a [place] resident and started smoking cigarettes before September 1, 1988.  Call the Smoking Cessation Trust ([facility]) toll free at (172) 431-5157 or (515) 650-9937.   Call 2-694-QUIT-NOW if you do not meet the above criteria.            Language Assistance Services     ATTENTION: Language assistance services are available, free of charge. Please call [phone].      ATENCIÓN: Si habla español, tiene a lee disposición servicios gratuitos de asistencia lingüística. Llame al [phone].     [facility] Ý: N?u b?n nói Ti?ng Vi?t, có các d?ch v? h? tr? ngôn ng? mi?n phí dành cho b?n. G?i s? [phone].         [facility] complies with applicable Federal civil rights laws and does not discriminate on the basis of race, color, national origin, age, disability, or sex.

## 2017-02-20 NOTE — PATIENT INSTRUCTIONS
Recommend lotions: eucerin, aquaphor, A&D ointment, gold bond for diabetics, sween    Shoe recommendations: (try [email or web address], [email or web address][email or web address] , [email or web address][email or web address], or shoes[employer] for discounted prices) you can visit [employer] shoes in [place] as well    Asics (GT 1000 or gel foundations), new balance, saucony (stabil c3),  Gray (transcend), vionic, propet (tennis shoe)    soft brand, clarks, crocs, aerosoles, naturalizers, SAS, ecco, pearl, donna lew, rockports (dress shoes)    Vionic, volitiles, burkenstocks, fitflops, propet (sandals)    Nike comfort thong sandals, crocs (house shoes)    Nail Home remedy:  Vicks Vapor rub OR Listerine and apple cider vinegar in a spray bottle to nails    Occasional soaks for 15-20 mins in luke warm water with 1 cup of listerine and 1 cup of apple cider vinegar are ok You may add several drops of oil of oregano or tea tree oil as well      Understanding Plantar Fasciitis    Plantar fasciitis is a condition that causes foot and heel pain. The plantar fascia is a tough band of tissue that runs across the bottom of the foot from the heel to the toes. This tissue pulls on the heel bone. It supports the arch of the foot as it pushes off the ground. If the tissue becomes irritated or red and swollen (inflamed), it is called plantar fasciitis.  How to say it  PLAN-tuhr fa-see-IY-[id number]   What causes plantar fasciitis?  Plantar fasciitis most often occurs from overusing the plantar fascia. The tissue may become damaged from activities that put repeated stress on the heel and foot. Or it may wear down over time with age and ankle stiffness. You are more likely to have plantar fasciitis if you:  · Do activities that require a lot of running, jumping, or dancing  · Have a job that requires being on your feet for long periods  · Are overweight or obese  · Have certain foot problems, such as a tight Achilles tendon, flat feet, or high arches  · Often wear poorly fitting shoes  Symptoms of plantar fasciitis  The  condition most often causes pain in the heel and the bottom of the foot. The pain may occur when you take your first steps in the morning. It may get better as you walk throughout the day. But as you continue to put weight on the foot, the pain often returns. Pain may also occur after standing or sitting for long periods.  Treating plantar fasciitis  Treatments for plantar fasciitis include:  · Resting the foot. This involves limiting movements that make your foot hurt. You may also need to avoid certain sports and types of work for a time.  · Using cold packs. Put an ice pack on the heel and foot to help reduce pain and swelling.  · Taking pain medicines. Prescription and over-the-counter pain medicines can help relieve pain and swelling.  · Using heel cups or foot inserts (orthotics). These are placed in the shoes to help support the heel or arch and cushion the heel. You may also be told to buy proper-fitting shoes with good arch support and cushioned soles.  · Taping the foot. This supports the arch and limits the movement of the plantar fascia to help relieve symptoms.  · Wearing a night splint. This stretches the plantar fascia and leg muscles while you sleep. This may help relieve pain.  · Doing exercises and physical therapy. These stretch and strengthen the plantar fascia and the muscles in the leg that support the heel and foot.  · Getting shots of medicine into the foot. These may help relieve symptoms for a time.  · Having surgery. This may be needed if other treatments fail to relieve symptoms. During surgery, the surgeon may partially cut the plantar fascia to release tension.  Possible complications of plantar fasciitis  Without proper care and treatment, healing may take longer than normal. Also, symptoms may continue or get worse. Over time, the plantar fascia may be damaged. This can make it hard to walk or even stand without pain.  When to call your healthcare provider  Call your healthcare  provider right away if you have any of these:  · Fever of 100.4°F (38°C) or higher, or as directed  · Symptoms that dont get better with treatment, or get worse  · New symptoms, such as numbness, tingling, or weakness in the foot   © 9911-6367 [employer]. [address] [place] [address], [place] [address]. All rights reserved. This information is not intended as a substitute for professional medical care. Always follow your healthcare professional's instructions.        Treating Plantar Fasciitis  First, your healthcare provider tries to determine the cause of your problem in order to suggest ways to relieve pain. If your pain is due to poor foot mechanics, custom-made shoe inserts (orthoses) may help.    Reduce symptoms  · To relieve mild symptoms, try aspirin, ibuprofen, or other medicines as directed. Rubbing ice on the affected area may also help.  · To reduce severe pain and swelling, your healthcare provider may prescribe pills or injections or a walking cast in some instances. Physical therapy, such as ultrasound or a daily stretching program, may also be recommended. Surgery is rarely required.  · To reduce symptoms caused by poor foot mechanics, your foot may be taped. This supports the arch and temporarily controls movement. Night splints may also help by stretching the fascia.  Control movement  If taping helps, your healthcare provider may prescribe orthoses. Built from plaster casts of your feet, these inserts control the way your foot moves. As a result, your symptoms should go away.  Reduce overuse  Every time your foot strikes the ground, the plantar fascia is stretched. You can reduce the strain on the plantar fascia and the possibility of overuse by following these suggestions:  · Lose any excess weight.  · Avoid running on hard or uneven ground.  · Use orthoses at all times in your shoes and house slippers.  If surgery is needed  Your healthcare provider may consider surgery if other types  of treatment don't control your pain. During surgery, the plantar fascia is partially cut to release tension. As you heal, fibrous tissue fills the space between the heel bone and the plantar fascia.   © 1005-6348 [employer]. [address] [place] [address], [place] [address]. All rights reserved. This information is not intended as a substitute for professional medical care. Always follow your healthcare professional's instructions.        Wearing Proper Shoes                    You walk on your feet every day, forcing them to support the weight of your body. Repeated stress on your feet can cause damage over time. The right shoes can help protect your feet. The wrong shoes can cause more foot problems. Read the information below to help you find a shoe that fits your foot needs.     A good shoe fit will cover your foot outline.       A shoe that doesnt cover the outline is a bad fit.      Whats Your Foot Shape?  To get a good fit, you need to know the shape of your foot. Do this simple test: While standing, place your foot on a piece of paper and trace around it. Is your foot straight or curved? Do you have a foot problem, such as a bunion, that causes your foot outline to show a bulge on the side of your big toe?  Finding Your Fit  Bring your foot outline to the [employer] store to help you find the right shoe. Place a shoe you like on top of the outline to see if it matches the shape. The shoe should cover the outline. (If you have a bunion, the shoe may not cover the bulge on the outline. Look for soft leather shoes to stretch over the bunion.) Once youve found a pair of proper shoes, put them on. Walk around. Be sure the shoes dont rub or pinch. If the shoes feel good, youve found your fit!  The Right Shoe for You  A good shoe has features that provide comfort and support. It must also be the right size and shape for your feet. Look for a shoe made of breathable fabric and lining, such as leather or  canvas. Be sure that shoes have enough tread to prevent slipping. Go to a good shoe store for help finding the right shoe.  Good Shoe Features  An ideal shoe has the following:  · Laces for support. If tying laces is a problem for you, try shoes with Velcro fasteners or jenna.  · A front of the shoe (toe box) with ½ inch space in front of your longest toes.  · An arch shape that supports your foot.  · No more than 1½ inches of heel.  · A stiff, snug back of the shoe to keep your foot from sliding around.  · A smooth lining with no rough seams.  Shoe Shopping Tips  Below are some dos and donts for when you go to the shoe store.  Do:  · Select the shoes that feel right. Wear them around the house. Then bring them to your foot doctor to check for fit. If they dont fit well, return them.  · Shop late in the day, when your feet will be slightly bigger.  · Each time you buy shoes, have both your feet measured while you are standing. Foot size changes with time.  · Pick shoes to suit their purpose. High heels are okay for an occasional night on the town. But for everyday wear, choose a more sensible shoe.  · Try on shoes while wearing any inserts specially made for your feet (orthoses).  · Try on both the right and left shoes. If your feet are different sizes, pick a pair that fits the larger foot.  Dont:  · Dont buy shoes based on shoe size alone. Always try on shoes, as sizes differ from brand to brand and within brands.  · Dont expect shoes to break in. If they dont fit at the store, dont buy them.  · Dont buy a shoe that doesnt match your foot shape.  What About Socks?  Always wear socks with shoes. Socks help absorb sweat and reduce friction and blistering. When shopping for shoes, choose soft, padded socks with seams that dont irritate your feet.  If You Have Foot Problems  Some foot problems cause deformities. This can make it hard to find a good fit. Look for shoes made of soft leather to stretch  over the deformity. If you have bunions, buy shoes with a wider toe box. To fit hammertoes, look for shoes with a tall toe box. If you have arch problems, you may need inserts. In some cases, youll need to have custom footwear or orthoses made for your feet.  Suggested Footwear  Ask your healthcare provider what kind of footwear you need. He or she may recommend a certain brand or shoe store.  © 4727-8747 [employer]. [address] [place] [address]. All rights reserved. This information is not intended as a substitute for professional medical care. Always follow your healthcare professional's instructions.        Toe Extension (Flexibility)    These instructions are for your right foot. Switch sides for your left foot.  1. Sit in a chair. Rest your right ankle on your left knee.  2. Hold your toes with your right hand. Gently bend the toes backward. Feel a stretch in the undersides of the toes and ball of the foot. Hold for 30 to 60 seconds.  3. Then gently bend the toes in the other direction. Gently press on them until your foot is pointed. Hold for 30 to 60 seconds.  4. Repeat 5 times, or as instructed.  © 6075-6467 [employer]. [address] [place] [address], [place] [address]. All rights reserved. This information is not intended as a substitute for professional medical care. Always follow your healthcare professional's instructions.

## 2017-02-20 NOTE — PROGRESS NOTES
Subjective:      Patient ID: Yanni Kaiser is a 46 y.o. female.    Chief Complaint: Diabetes Mellitus (pcp Dr. Gordillo 2-6-17); Diabetic Foot Exam; Nail Care; and Foot Pain (right foot )    Yanni is a 46 y.o. female who RTC for follow up of bilateral foot pain, R>>>L.  Plantar medial heel, especially with the first step in the morning. The pain is described as Aching and Throbbing. The onset of the pain was gradual and has worsened over the past several months. Yanni Kaiser rates the pain as 10/10. she denies a history of trauma. she relates pain began without inciting event    She also relates neuropathic pain to the feet throughout the day. She takes gabapentin 300mg TID.    Shoe gear: slide on shoes, no arch support    Patient admits to not being complaint with instruction.     This patient has documented high risk feet requiring routine maintenance secondary to diabetes mellitis and those secondary complications of diabetes, as mentioned..    PCP: Carlyle Gordillo MD    Date Last Seen by PCP:   Chief Complaint   Patient presents with    Diabetes Mellitus     pcp Dr. Gordillo 2-6-17    Diabetic Foot Exam    Nail Care    Foot Pain     right foot      Current shoe gear:  rx shoes    Hemoglobin A1C   Date Value Ref Range Status   02/21/2017 10.3 (H) 4.5 - 6.2 % Final     Comment:     According to ADA guidelines, hemoglobin A1C <7.0% represents  optimal control in non-pregnant diabetic patients.  Different  metrics may apply to specific populations.   Standards of Medical Care in Diabetes - 2016.  For the purpose of screening for the presence of diabetes:  <5.7%     Consistent with the absence of diabetes  5.7-6.4%  Consistent with increasing risk for diabetes   (prediabetes)  >or=6.5%  Consistent with diabetes  Currently no consensus exists for use of hemoglobin A1C  for diagnosis of diabetes for children.     01/11/2017 6.7 (H) 4.5 - 6.2 % Final     Comment:     According to ADA guidelines,  hemoglobin A1C <7.0% represents  optimal control in non-pregnant diabetic patients.  Different  metrics may apply to specific populations.   Standards of Medical Care in Diabetes - 2016.  For the purpose of screening for the presence of diabetes:  <5.7%     Consistent with the absence of diabetes  5.7-6.4%  Consistent with increasing risk for diabetes   (prediabetes)  >or=6.5%  Consistent with diabetes  Currently no consensus exists for use of hemoglobin A1C  for diagnosis of diabetes for children.     10/17/2016 6.6 (H) 4.5 - 6.2 % Final     Comment:     According to ADA guidelines, hemoglobin A1C <7.0% represents  optimal control in non-pregnant diabetic patients.  Different  metrics may apply to specific populations.   Standards of Medical Care in Diabetes - 2016.  For the purpose of screening for the presence of diabetes:  <5.7%     Consistent with the absence of diabetes  5.7-6.4%  Consistent with increasing risk for diabetes   (prediabetes)  >or=6.5%  Consistent with diabetes  Currently no consensus exists for use of hemoglobin A1C  for diagnosis of diabetes for children.       Patient Active Problem List   Diagnosis    Body mass index 60.0-69.9, adult    Mild intermittent asthma    SHARATH (obstructive sleep apnea)    Leg varices    Low back pain    Seizure disorder    Type 2 diabetes mellitus, controlled    Migraine    Morbid obesity with BMI of 60.0-69.9, adult    Tobacco abuse    Anemia of chronic disease    Mild protein malnutrition    Weakness    Allergic rhinitis    Idiopathic intracranial hypertension    Essential hypertension    Combined hyperlipidemia associated with type 2 diabetes mellitus    Depression    Seizure    Hyperlipidemia    GERD (gastroesophageal reflux disease)    Epilepsy    Plantar fasciitis of right foot    Papilledema associated with increased intracranial pressure    Diabetic ketoacidosis without coma associated with type 2 diabetes mellitus    DUSTIN (acute  kidney injury)     No current facility-administered medications on file prior to visit.      Current Outpatient Prescriptions on File Prior to Visit   Medication Sig Dispense Refill    acetaZOLAMIDE (DIAMOX) 500 mg CpSR Take 500 mg by mouth 2 (two) times daily.      atorvastatin (LIPITOR) 20 MG tablet Take 1 tablet (20 mg total) by mouth once daily. 90 tablet 1    butalbital-acetaminophen-caffeine -40 mg (FIORICET, ESGIC) -40 mg per tablet TK 1 T PO Q 8 H PRF PAIN OR HEADACHES  2    capsaicin 0.1 % Crea Apply 1 application topically 2 (two) times daily. 56.6 g 1    EPIPEN 2-RHODA 0.3 mg/0.3 mL (1:1,000) AtIn   1    fluticasone (FLONASE) 50 mcg/actuation nasal spray 1 spray by Each Nare route daily as needed. 16 g 1    fluticasone-vilanterol (BREO ELLIPTA) 200-25 mcg/dose DsDv diskus inhaler Inhale 1 puff into the lungs once daily. 1 each 3    gabapentin (NEURONTIN) 300 MG capsule Take 2 capsules (600 mg total) by mouth 3 (three) times daily. 180 capsule 11    ipratropium (ATROVENT) 0.02 % nebulizer solution Take 2.5 mLs (500 mcg total) by nebulization 4 (four) times daily. 100 vial 0    lamotrigine (LAMICTAL) 25 MG tablet Take 1 tablet (25 mg total) by mouth 2 (two) times daily. 60 tablet 11    levalbuterol (XOPENEX) 1.25 mg/0.5 mL nebulizer solution Take 0.5 mLs (1.25 mg total) by nebulization every 4 (four) hours as needed for Wheezing. 100 each 2    LINZESS 290 mcg Cap TK ONE CAPSULE PO D ON AN EMPTY STOMACH  5    losartan-hydrochlorothiazide 100-25 mg (HYZAAR) 100-25 mg per tablet Take 1 tablet by mouth once daily. 90 tablet 4    metformin (GLUCOPHAGE-XR) 500 MG 24 hr tablet TAKE 1 TABLET BY MOUTH DAILY WITH BREAKFAST 90 tablet 0    montelukast (SINGULAIR) 10 mg tablet Take 1 tablet (10 mg total) by mouth once daily. 30 tablet 5    oxycodone (ROXICODONE) 15 MG Tab Take 1 tablet (15 mg total) by mouth every 6 (six) hours as needed. 40 tablet 0    pantoprazole (PROTONIX) 40 MG tablet  once daily.   0    primidone (MYSOLINE) 50 MG Tab Take 1 tablet (50 mg total) by mouth every evening. 90 tablet 3    TAZTIA  mg CpSR TK ONE CAPSULE PO D  2    topiramate (TOPAMAX) 200 MG Tab Take 1 tablet (200 mg total) by mouth 2 (two) times daily. 60 tablet 11    norethindrone (AYGESTIN) 5 mg Tab Take 1 tablet (5 mg total) by mouth once daily. TAKE D 1-10 Q MONTH 10 tablet 12    nystatin-triamcinolone (MYCOLOG) ointment Apply topically 2 (two) times daily. 60 g 2     Review of patient's allergies indicates:  No Known Allergies  Past Surgical History   Procedure Laterality Date    Cholecystectomy      Sinus surgery      Gallbladder surgery       Family History   Problem Relation Age of Onset    Cancer Mother     Hypertension Mother     Diabetes Mother     Stroke Father     Heart disease Father     COPD Father     Heart attack Father     Cancer Maternal Grandmother      Social History     Social History    Marital status:      Spouse name: N/A    Number of children: N/A    Years of education: N/A     Occupational History    Not on file.     Social History Main Topics    Smoking status: Current Every Day Smoker     Packs/day: 1.00     Years: 15.00     Types: Cigarettes     Last attempt to quit: 6/10/2015    Smokeless tobacco: Never Used    Alcohol use No    Drug use: No    Sexual activity: Yes     Partners: Male     Birth control/ protection: None     Other Topics Concern    Not on file     Social History Narrative        Review of Systems   Constitution: Negative for chills, fever and weakness.   Cardiovascular: Positive for leg swelling. Negative for chest pain and claudication.   Respiratory: Negative for cough and shortness of breath.    Skin: Positive for dry skin and nail changes. Negative for itching and rash.   Musculoskeletal: Positive for arthritis, joint pain and myalgias. Negative for falls, joint swelling and muscle weakness.   Gastrointestinal: Negative for  "diarrhea, nausea and vomiting.   Neurological: Positive for numbness and paresthesias. Negative for tremors.   Psychiatric/Behavioral: Negative for altered mental status and hallucinations.           Objective:       Vitals:    02/20/17 0853   Weight: (!) 165.1 kg (364 lb)   Height: 5' 4" (1.626 m)   PainSc: 10-Worst pain ever   PainLoc: Foot       Physical Exam   Constitutional:   General: Pt. is well-developed, well-nourished, appears stated age, in no acute distress, alert and oriented x 3. No evidence of depression, anxiety, or agitation. Calm, cooperative, and communicative. Appropriate interactions and affect.       Cardiovascular:   Pulses:       Dorsalis pedis pulses are 1+ on the right side, and 1+ on the left side.        Posterior tibial pulses are 1+ on the right side, and 1+ on the left side.   Dorsalis pedis and posterior tibial pulses are diminished Bilaterally. Toes are cool to touch. Feet are warm proximally.There is decreased digital hair. Skin is atrophic, hyperpigmented, and mildly edematous       Musculoskeletal:        Right ankle: She exhibits decreased range of motion and swelling. No tenderness. No lateral malleolus, no medial malleolus, no CF ligament, no head of 5th metatarsal and no proximal fibula tenderness found. Achilles tendon exhibits no pain and no defect.        Left ankle: She exhibits decreased range of motion and swelling. No tenderness. No lateral malleolus, no medial malleolus, no head of 5th metatarsal and no proximal fibula tenderness found. Achilles tendon exhibits no pain and no defect.        Right foot: There is normal range of motion and no tenderness.        Left foot: There is tenderness (sub 2nd MTPJ). There is normal range of motion.   Biomechanical exam: Pain on palpation bilateral medial calcaneal tubercle at origin of plantar fascia. There is equinus deformity bilateral with decreased dorsiflexion at the ankle joint bilateral. No tenderness with compression of " heel. Negative tinels sign. Gait analysis reveals excessive pronation through midstance and propulsion with early heel off. Shoes reveals lateral heel counter wear bilateral     Muscle strength is 5/5 in all groups bilaterally.    Decreased stride, station of gait.  apropulsive toe off.  Increased angle and base of gait.    Patient has hammertoes of digits 2-5 bilateral partially reducible without symptom today.    Decreased first MPJ range of motion both weightbearing and nonweightbearing, no crepitus observed the first MP joint,+ dorsal flag sign. Mild  bunion deformity is observed .    Decreased arch height noted b/l.    Neurological: A sensory deficit is present.   [place] 5.07 monofilamant testing is diminished Maciej feet. Sharp/dull sensation diminished Bilaterally.     Paresthesias, and hyperesthesia bilateral feet with no clearly identified trigger or source.     Skin: Skin is warm, dry and intact. No abrasion, no ecchymosis, no lesion and no rash noted. She is not diaphoretic. No cyanosis or erythema. No pallor. Nails show no clubbing.   Toenails 1-5 bilaterally neatly trimmed and painted thickened by 2-3 mm,     Interdigital Spaces clean, dry and without evidence of break in skin integrity   Psychiatric: She has a normal mood and affect. Her speech is normal.   Nursing note and vitals reviewed.            Assessment:       Encounter Diagnoses   Name Primary?    Controlled type 2 diabetes mellitus without complication, without long-term current use of insulin Yes    Foot pain, bilateral     Pes planus of both feet     Plantar fasciitis     Hallux limitus, unspecified laterality     Hammer toes of both feet          Plan:       Yanni was seen today for diabetes mellitus, diabetic foot exam, nail care and foot pain.    Diagnoses and all orders for this visit:    Controlled type 2 diabetes mellitus without complication, without long-term current use of insulin  -     DIABETIC SHOES FOR HOME  USE    Foot pain, bilateral  -     Ambulatory Referral to Physical/Occupational Therapy    Pes planus of both feet  -     Ambulatory Referral to Physical/Occupational Therapy  -     DIABETIC SHOES FOR HOME USE    Plantar fasciitis  -     Ambulatory Referral to Physical/Occupational Therapy  -     DIABETIC SHOES FOR HOME USE    Hallux limitus, unspecified laterality  -     Ambulatory Referral to Physical/Occupational Therapy  -     DIABETIC SHOES FOR HOME USE    Hammer toes of both feet  -     Ambulatory Referral to Physical/Occupational Therapy  -     DIABETIC SHOES FOR HOME USE    Other orders  -     bupivacaine (PF) 0.5% (5 mg/ml) injection 7.5 mg; 1.5 mLs (7.5 mg total) by Intrapleural route once.  -     dexamethasone injection 4 mg; 1 mL (4 mg total) by Other route one time.  -     lidocaine (PF) 20 mg/ml (2%) injection 30 mg; 1.5 mLs (30 mg total) by Other route once.  -     triamcinolone acetonide injection 40 mg; 1 mL (40 mg total) by Other route one time.      I counseled the patient on her conditions, their implications and medical management.     - Shoe inspection. Diabetic Foot Education. Patient reminded of the importance of good nutrition and blood sugar control to help prevent podiatric complications of diabetes. Patient instructed on proper foot hygeine. We discussed wearing proper shoe gear, daily foot inspections, never walking without protective shoe gear, never putting sharp instruments to feet.     Discussed different treatment options for foot pain. I gave written and verbal instructions on stretching exercises. Patient expressed understanding. Discussed icing the affected area as needed and also wearing appropriate shoe gear and avoiding flats, slippers, sandals, and going barefoot. My recommendation for OTC supports is [facility]. Patient instructed on adequate icing techniques. Patient should ice the affected area at least once per day x 10 minutes for 10 days . I advised the patient  that extra icing would also be beneficial to ensure adequate anti inflammatory effect. We also discussed cortisone injections and NSAID therapy.     Patient would like injection today. Informed her that steroid can raise glucose. With patient's permission,  a cortisone injection was performed at the right foot, medial approach, using 3ccs of mixture of (1cc 1% plain Lidocaine : 1cc 0.5% Marcaine plain:  0.5cc kenalog-40 : 0.5cc dexamethasone).   This was well tolerated.         Referral made to PT    When I offered NSAID, patient asks for a refill of Roxicodone. I reminded patient that she was given this medication by her PCP and that it is not used to treat heel pain for a long term treatment    RTC in 8-10 weeks if no improvement

## 2017-02-21 ENCOUNTER — HOSPITAL ENCOUNTER (INPATIENT)
Facility: HOSPITAL | Age: 47
LOS: 2 days | Discharge: HOME | DRG: 638 | End: 2017-02-23
Attending: EMERGENCY MEDICINE | Admitting: INTERNAL MEDICINE
Payer: MEDICARE

## 2017-02-21 ENCOUNTER — TELEPHONE (OUTPATIENT)
Dept: FAMILY MEDICINE | Facility: CLINIC | Age: 47
End: 2017-02-21

## 2017-02-21 DIAGNOSIS — G43.909 MIGRAINE WITHOUT STATUS MIGRAINOSUS, NOT INTRACTABLE, UNSPECIFIED MIGRAINE TYPE: Chronic | ICD-10-CM

## 2017-02-21 DIAGNOSIS — R56.9 CONVULSIONS, UNSPECIFIED CONVULSION TYPE: ICD-10-CM

## 2017-02-21 DIAGNOSIS — E11.69 COMBINED HYPERLIPIDEMIA ASSOCIATED WITH TYPE 2 DIABETES MELLITUS: ICD-10-CM

## 2017-02-21 DIAGNOSIS — E78.2 COMBINED HYPERLIPIDEMIA ASSOCIATED WITH TYPE 2 DIABETES MELLITUS: ICD-10-CM

## 2017-02-21 DIAGNOSIS — G93.2 IDIOPATHIC INTRACRANIAL HYPERTENSION: Chronic | ICD-10-CM

## 2017-02-21 DIAGNOSIS — D63.8 ANEMIA OF CHRONIC DISEASE: Chronic | ICD-10-CM

## 2017-02-21 DIAGNOSIS — E44.1 MILD PROTEIN MALNUTRITION: Chronic | ICD-10-CM

## 2017-02-21 DIAGNOSIS — G40.909 SEIZURE DISORDER: Chronic | ICD-10-CM

## 2017-02-21 DIAGNOSIS — G47.33 OSA (OBSTRUCTIVE SLEEP APNEA): ICD-10-CM

## 2017-02-21 DIAGNOSIS — R53.1 WEAKNESS: ICD-10-CM

## 2017-02-21 DIAGNOSIS — J45.20 MILD INTERMITTENT ASTHMA WITHOUT COMPLICATION: ICD-10-CM

## 2017-02-21 DIAGNOSIS — E11.10 DIABETIC KETOACIDOSIS WITHOUT COMA ASSOCIATED WITH TYPE 2 DIABETES MELLITUS: Primary | ICD-10-CM

## 2017-02-21 DIAGNOSIS — N17.9 AKI (ACUTE KIDNEY INJURY): ICD-10-CM

## 2017-02-21 DIAGNOSIS — R20.0 NUMBNESS OF RIGHT HAND: ICD-10-CM

## 2017-02-21 DIAGNOSIS — Z72.0 TOBACCO ABUSE: Chronic | ICD-10-CM

## 2017-02-21 DIAGNOSIS — E66.01 MORBID OBESITY WITH BMI OF 60.0-69.9, ADULT: Chronic | ICD-10-CM

## 2017-02-21 DIAGNOSIS — R56.9 SEIZURE: ICD-10-CM

## 2017-02-21 DIAGNOSIS — E86.0 DEHYDRATION: ICD-10-CM

## 2017-02-21 DIAGNOSIS — M72.2 PLANTAR FASCIITIS OF RIGHT FOOT: ICD-10-CM

## 2017-02-21 DIAGNOSIS — I83.90: ICD-10-CM

## 2017-02-21 DIAGNOSIS — H47.11 PAPILLEDEMA ASSOCIATED WITH INCREASED INTRACRANIAL PRESSURE: ICD-10-CM

## 2017-02-21 DIAGNOSIS — I10 ESSENTIAL HYPERTENSION: Chronic | ICD-10-CM

## 2017-02-21 LAB
ALBUMIN SERPL BCP-MCNC: 3.8 G/DL
ALLENS TEST: ABNORMAL
ALP SERPL-CCNC: 125 U/L
ALT SERPL W/O P-5'-P-CCNC: 21 U/L
ANION GAP SERPL CALC-SCNC: 13 MMOL/L
ANION GAP SERPL CALC-SCNC: 9 MMOL/L
AST SERPL-CCNC: 11 U/L
B-HCG UR QL: NEGATIVE
B-OH-BUTYR BLD STRIP-SCNC: 2.3 MMOL/L
BACTERIA #/AREA URNS HPF: ABNORMAL /HPF
BASOPHILS # BLD AUTO: 0.01 K/UL
BASOPHILS NFR BLD: 0.1 %
BILIRUB SERPL-MCNC: 0.5 MG/DL
BILIRUB UR QL STRIP: NEGATIVE
BUN SERPL-MCNC: 16 MG/DL
BUN SERPL-MCNC: 19 MG/DL
CALCIUM SERPL-MCNC: 9.5 MG/DL
CALCIUM SERPL-MCNC: 9.6 MG/DL
CHLORIDE SERPL-SCNC: 105 MMOL/L
CHLORIDE SERPL-SCNC: 96 MMOL/L
CLARITY UR: CLEAR
CO2 SERPL-SCNC: 19 MMOL/L
CO2 SERPL-SCNC: 23 MMOL/L
COLOR UR: COLORLESS
CREAT SERPL-MCNC: 1.4 MG/DL
CREAT SERPL-MCNC: 1.8 MG/DL
CTP QC/QA: YES
DELSYS: ABNORMAL
DIFFERENTIAL METHOD: ABNORMAL
EOSINOPHIL # BLD AUTO: 0 K/UL
EOSINOPHIL NFR BLD: 0 %
ERYTHROCYTE [DISTWIDTH] IN BLOOD BY AUTOMATED COUNT: 19.5 %
EST. GFR  (AFRICAN AMERICAN): 39 ML/MIN/1.73 M^2
EST. GFR  (AFRICAN AMERICAN): 53 ML/MIN/1.73 M^2
EST. GFR  (NON AFRICAN AMERICAN): 34 ML/MIN/1.73 M^2
EST. GFR  (NON AFRICAN AMERICAN): 46 ML/MIN/1.73 M^2
GLUCOSE SERPL-MCNC: 466 MG/DL
GLUCOSE SERPL-MCNC: 852 MG/DL
GLUCOSE UR QL STRIP: ABNORMAL
HCO3 UR-SCNC: 19.9 MMOL/L (ref 24–28)
HCT VFR BLD AUTO: 37.6 %
HGB BLD-MCNC: 11.4 G/DL
HGB UR QL STRIP: NEGATIVE
KETONES UR QL STRIP: ABNORMAL
LEUKOCYTE ESTERASE UR QL STRIP: ABNORMAL
LYMPHOCYTES # BLD AUTO: 1.1 K/UL
LYMPHOCYTES NFR BLD: 14.4 %
MCH RBC QN AUTO: 24.2 PG
MCHC RBC AUTO-ENTMCNC: 30.3 %
MCV RBC AUTO: 80 FL
MICROSCOPIC COMMENT: ABNORMAL
MODE: ABNORMAL
MONOCYTES # BLD AUTO: 0.4 K/UL
MONOCYTES NFR BLD: 5.1 %
NEUTROPHILS # BLD AUTO: 6.2 K/UL
NEUTROPHILS NFR BLD: 80.4 %
NITRITE UR QL STRIP: NEGATIVE
PCO2 BLDA: 40.9 MMHG (ref 35–45)
PH SMN: 7.29 [PH] (ref 7.35–7.45)
PH UR STRIP: 6 [PH] (ref 5–8)
PLATELET # BLD AUTO: 348 K/UL
PMV BLD AUTO: 10 FL
PO2 BLDA: 71 MMHG (ref 80–100)
POC BE: -6 MMOL/L
POC SATURATED O2: 92 % (ref 95–100)
POC TCO2: 21 MMOL/L (ref 23–27)
POCT GLUCOSE: 370 MG/DL (ref 70–110)
POCT GLUCOSE: 394 MG/DL (ref 70–110)
POCT GLUCOSE: 431 MG/DL (ref 70–110)
POCT GLUCOSE: 444 MG/DL (ref 70–110)
POCT GLUCOSE: 453 MG/DL (ref 70–110)
POCT GLUCOSE: 485 MG/DL (ref 70–110)
POCT GLUCOSE: >500 MG/DL (ref 70–110)
POCT GLUCOSE: >500 MG/DL (ref 70–110)
POTASSIUM SERPL-SCNC: 4.3 MMOL/L
POTASSIUM SERPL-SCNC: 4.7 MMOL/L
PROT SERPL-MCNC: 7.6 G/DL
PROT UR QL STRIP: NEGATIVE
RBC # BLD AUTO: 4.72 M/UL
SAMPLE: ABNORMAL
SITE: ABNORMAL
SODIUM SERPL-SCNC: 128 MMOL/L
SODIUM SERPL-SCNC: 137 MMOL/L
SP GR UR STRIP: 1.03 (ref 1–1.03)
SP02: 95
SQUAMOUS #/AREA URNS HPF: ABNORMAL /HPF
URN SPEC COLLECT METH UR: ABNORMAL
UROBILINOGEN UR STRIP-ACNC: NEGATIVE EU/DL
WBC # BLD AUTO: 7.66 K/UL
WBC #/AREA URNS HPF: 20 /HPF (ref 0–5)
YEAST URNS QL MICRO: ABNORMAL

## 2017-02-21 PROCEDURE — 83036 HEMOGLOBIN GLYCOSYLATED A1C: CPT

## 2017-02-21 PROCEDURE — 80048 BASIC METABOLIC PNL TOTAL CA: CPT

## 2017-02-21 PROCEDURE — 96365 THER/PROPH/DIAG IV INF INIT: CPT | Mod: XS

## 2017-02-21 PROCEDURE — 96361 HYDRATE IV INFUSION ADD-ON: CPT

## 2017-02-21 PROCEDURE — 81025 URINE PREGNANCY TEST: CPT | Performed by: EMERGENCY MEDICINE

## 2017-02-21 PROCEDURE — 63600175 PHARM REV CODE 636 W HCPCS: Performed by: EMERGENCY MEDICINE

## 2017-02-21 PROCEDURE — 96376 TX/PRO/DX INJ SAME DRUG ADON: CPT

## 2017-02-21 PROCEDURE — 25000003 PHARM REV CODE 250: Performed by: EMERGENCY MEDICINE

## 2017-02-21 PROCEDURE — 99900035 HC TECH TIME PER 15 MIN (STAT)

## 2017-02-21 PROCEDURE — 96375 TX/PRO/DX INJ NEW DRUG ADDON: CPT

## 2017-02-21 PROCEDURE — 96366 THER/PROPH/DIAG IV INF ADDON: CPT

## 2017-02-21 PROCEDURE — 25000003 PHARM REV CODE 250: Performed by: INTERNAL MEDICINE

## 2017-02-21 PROCEDURE — 63600175 PHARM REV CODE 636 W HCPCS: Performed by: INTERNAL MEDICINE

## 2017-02-21 PROCEDURE — 82962 GLUCOSE BLOOD TEST: CPT

## 2017-02-21 PROCEDURE — 36600 WITHDRAWAL OF ARTERIAL BLOOD: CPT

## 2017-02-21 PROCEDURE — 85025 COMPLETE CBC W/AUTO DIFF WBC: CPT

## 2017-02-21 PROCEDURE — 99291 CRITICAL CARE FIRST HOUR: CPT | Mod: 25

## 2017-02-21 PROCEDURE — 81000 URINALYSIS NONAUTO W/SCOPE: CPT

## 2017-02-21 PROCEDURE — 82010 KETONE BODYS QUAN: CPT

## 2017-02-21 PROCEDURE — 82803 BLOOD GASES ANY COMBINATION: CPT

## 2017-02-21 PROCEDURE — 80053 COMPREHEN METABOLIC PANEL: CPT

## 2017-02-21 PROCEDURE — 21400001 HC TELEMETRY ROOM

## 2017-02-21 RX ORDER — ACETAZOLAMIDE 500 MG/1
500 CAPSULE, EXTENDED RELEASE ORAL 2 TIMES DAILY
Status: DISCONTINUED | OUTPATIENT
Start: 2017-02-21 | End: 2017-02-23 | Stop reason: HOSPADM

## 2017-02-21 RX ORDER — PANTOPRAZOLE SODIUM 40 MG/1
40 TABLET, DELAYED RELEASE ORAL DAILY
Status: DISCONTINUED | OUTPATIENT
Start: 2017-02-21 | End: 2017-02-23 | Stop reason: HOSPADM

## 2017-02-21 RX ORDER — OXYCODONE HYDROCHLORIDE 5 MG/1
5 TABLET ORAL EVERY 4 HOURS PRN
Status: DISCONTINUED | OUTPATIENT
Start: 2017-02-21 | End: 2017-02-21

## 2017-02-21 RX ORDER — GLUCAGON 1 MG
1 KIT INJECTION
Status: DISCONTINUED | OUTPATIENT
Start: 2017-02-21 | End: 2017-02-22

## 2017-02-21 RX ORDER — ONDANSETRON 2 MG/ML
4 INJECTION INTRAMUSCULAR; INTRAVENOUS
Status: COMPLETED | OUTPATIENT
Start: 2017-02-21 | End: 2017-02-21

## 2017-02-21 RX ORDER — INSULIN ASPART 100 [IU]/ML
1-18 INJECTION, SOLUTION INTRAVENOUS; SUBCUTANEOUS
Status: DISCONTINUED | OUTPATIENT
Start: 2017-02-21 | End: 2017-02-22

## 2017-02-21 RX ORDER — OXYCODONE HYDROCHLORIDE 5 MG/1
10 TABLET ORAL EVERY 4 HOURS PRN
Status: DISCONTINUED | OUTPATIENT
Start: 2017-02-21 | End: 2017-02-21

## 2017-02-21 RX ORDER — LAMOTRIGINE 25 MG/1
25 TABLET ORAL 2 TIMES DAILY
Status: DISCONTINUED | OUTPATIENT
Start: 2017-02-21 | End: 2017-02-23 | Stop reason: HOSPADM

## 2017-02-21 RX ORDER — POTASSIUM CHLORIDE 7.45 MG/ML
10 INJECTION INTRAVENOUS
Status: DISPENSED | OUTPATIENT
Start: 2017-02-21 | End: 2017-02-21

## 2017-02-21 RX ORDER — ENOXAPARIN SODIUM 100 MG/ML
40 INJECTION SUBCUTANEOUS EVERY 24 HOURS
Status: DISCONTINUED | OUTPATIENT
Start: 2017-02-21 | End: 2017-02-23 | Stop reason: HOSPADM

## 2017-02-21 RX ORDER — SODIUM CHLORIDE 9 MG/ML
INJECTION, SOLUTION INTRAVENOUS CONTINUOUS
Status: DISCONTINUED | OUTPATIENT
Start: 2017-02-21 | End: 2017-02-22

## 2017-02-21 RX ORDER — OXYCODONE HYDROCHLORIDE 5 MG/1
5 TABLET ORAL EVERY 6 HOURS PRN
Status: DISCONTINUED | OUTPATIENT
Start: 2017-02-21 | End: 2017-02-22

## 2017-02-21 RX ORDER — SODIUM CHLORIDE 9 MG/ML
1000 INJECTION, SOLUTION INTRAVENOUS
Status: COMPLETED | OUTPATIENT
Start: 2017-02-21 | End: 2017-02-21

## 2017-02-21 RX ORDER — GABAPENTIN 300 MG/1
600 CAPSULE ORAL 3 TIMES DAILY
Status: DISCONTINUED | OUTPATIENT
Start: 2017-02-21 | End: 2017-02-23

## 2017-02-21 RX ORDER — ATORVASTATIN CALCIUM 10 MG/1
20 TABLET, FILM COATED ORAL DAILY
Status: DISCONTINUED | OUTPATIENT
Start: 2017-02-21 | End: 2017-02-23 | Stop reason: HOSPADM

## 2017-02-21 RX ORDER — ONDANSETRON 2 MG/ML
4 INJECTION INTRAMUSCULAR; INTRAVENOUS EVERY 12 HOURS PRN
Status: DISCONTINUED | OUTPATIENT
Start: 2017-02-21 | End: 2017-02-23 | Stop reason: HOSPADM

## 2017-02-21 RX ORDER — POLYETHYLENE GLYCOL 3350 17 G/17G
17 POWDER, FOR SOLUTION ORAL DAILY
Status: DISCONTINUED | OUTPATIENT
Start: 2017-02-21 | End: 2017-02-23 | Stop reason: HOSPADM

## 2017-02-21 RX ORDER — PANTOPRAZOLE SODIUM 40 MG/1
40 TABLET, DELAYED RELEASE ORAL DAILY
Status: DISCONTINUED | OUTPATIENT
Start: 2017-02-21 | End: 2017-02-21

## 2017-02-21 RX ORDER — TOPIRAMATE 100 MG/1
200 TABLET, FILM COATED ORAL 2 TIMES DAILY
Status: DISCONTINUED | OUTPATIENT
Start: 2017-02-21 | End: 2017-02-23 | Stop reason: HOSPADM

## 2017-02-21 RX ORDER — LOSARTAN POTASSIUM AND HYDROCHLOROTHIAZIDE 12.5; 5 MG/1; MG/1
2 TABLET ORAL DAILY
Status: DISCONTINUED | OUTPATIENT
Start: 2017-02-21 | End: 2017-02-21

## 2017-02-21 RX ORDER — ACETAMINOPHEN 325 MG/1
650 TABLET ORAL EVERY 6 HOURS PRN
Status: DISCONTINUED | OUTPATIENT
Start: 2017-02-21 | End: 2017-02-23 | Stop reason: HOSPADM

## 2017-02-21 RX ADMIN — GABAPENTIN 600 MG: 300 CAPSULE ORAL at 01:02

## 2017-02-21 RX ADMIN — INSULIN ASPART 18 UNITS: 100 INJECTION, SOLUTION INTRAVENOUS; SUBCUTANEOUS at 10:02

## 2017-02-21 RX ADMIN — INSULIN HUMAN 12 UNITS: 100 INJECTION, SOLUTION PARENTERAL at 08:02

## 2017-02-21 RX ADMIN — INSULIN ASPART 15 UNITS: 100 INJECTION, SOLUTION INTRAVENOUS; SUBCUTANEOUS at 06:02

## 2017-02-21 RX ADMIN — ACETAZOLAMIDE 500 MG: 500 CAPSULE, EXTENDED RELEASE ORAL at 10:02

## 2017-02-21 RX ADMIN — OXYCODONE HYDROCHLORIDE 5 MG: 5 TABLET ORAL at 10:02

## 2017-02-21 RX ADMIN — ONDANSETRON 4 MG: 2 INJECTION INTRAMUSCULAR; INTRAVENOUS at 08:02

## 2017-02-21 RX ADMIN — OXYCODONE HYDROCHLORIDE 5 MG: 5 TABLET ORAL at 02:02

## 2017-02-21 RX ADMIN — SODIUM CHLORIDE 1000 ML: 0.9 INJECTION, SOLUTION INTRAVENOUS at 08:02

## 2017-02-21 RX ADMIN — SODIUM CHLORIDE, SODIUM LACTATE, POTASSIUM CHLORIDE, AND CALCIUM CHLORIDE 1000 ML: .6; .31; .03; .02 INJECTION, SOLUTION INTRAVENOUS at 07:02

## 2017-02-21 RX ADMIN — SODIUM CHLORIDE: 0.9 INJECTION, SOLUTION INTRAVENOUS at 02:02

## 2017-02-21 RX ADMIN — ENOXAPARIN SODIUM 40 MG: 100 INJECTION SUBCUTANEOUS at 01:02

## 2017-02-21 RX ADMIN — INSULIN DETEMIR 10 UNITS: 100 INJECTION, SOLUTION SUBCUTANEOUS at 10:02

## 2017-02-21 RX ADMIN — INSULIN HUMAN 12 UNITS: 100 INJECTION, SOLUTION PARENTERAL at 10:02

## 2017-02-21 RX ADMIN — GABAPENTIN 600 MG: 300 CAPSULE ORAL at 10:02

## 2017-02-21 RX ADMIN — LAMOTRIGINE 25 MG: 25 TABLET ORAL at 10:02

## 2017-02-21 RX ADMIN — TOPIRAMATE 200 MG: 100 TABLET, FILM COATED ORAL at 10:02

## 2017-02-21 RX ADMIN — INSULIN DETEMIR 10 UNITS: 100 INJECTION, SOLUTION SUBCUTANEOUS at 01:02

## 2017-02-21 RX ADMIN — POTASSIUM CHLORIDE 10 MEQ: 10 INJECTION, SOLUTION INTRAVENOUS at 09:02

## 2017-02-21 RX ADMIN — PANTOPRAZOLE SODIUM 40 MG: 40 TABLET, DELAYED RELEASE ORAL at 07:02

## 2017-02-21 RX ADMIN — ATORVASTATIN CALCIUM 20 MG: 10 TABLET, FILM COATED ORAL at 01:02

## 2017-02-21 NOTE — ED NOTES
Pt report recieved from Marely MORENO. PT in the room lying in bed. Resp. even and unlabored. Skin warm and dry. Pt. AAOx3. Pt in room talking on phone no NAD noted at this time will continue to monitor.

## 2017-02-21 NOTE — PROGRESS NOTES
Results for VANI CAMPUZANO (MRN [id number]) as of 2/21/2017 08:25   Ref. Range 2/21/2017 08:15   POC PH Latest Ref Range: 7.35 - 7.45  7.295 (L)   POC PCO2 Latest Ref Range: 35 - 45 mmHg 40.9   POC PO2 Latest Ref Range: 80 - 100 mmHg 71 (L)   POC BE Latest Ref Range: -2 to 2 mmol/L -6   POC HCO3 Latest Ref Range: 24 - 28 mmol/L 19.9 (L)   POC SATURATED O2 Latest Ref Range: 95 - 100 % 92 (L)   POC TCO2 Latest Ref Range: 23 - 27 mmol/L 21 (L)   Sample Unknown ARTERIAL   DelSys Unknown Room Air   Allens Test Unknown Pass   Site Unknown RB   Mode Unknown SPONT   Sp02 Unknown 95   Dr Mukherjee aware pt remains on room air

## 2017-02-21 NOTE — IP AVS SNAPSHOT
[place] [address] [place] [address]  Phone: [phone]           Patient Discharge Instructions     Our goal is to set you up for success. This packet includes information on your condition, medications, and your home care. It will help you to care for yourself so you don't get sicker and need to go back to the hospital.     Please ask your nurse if you have any questions.        There are many details to remember when preparing to leave the hospital. Here is what you will need to do:    1. Take your medicine. If you are prescribed medications, review your Medication List in the following pages. You may have new medications to  at the pharmacy and others that you'll need to stop taking. Review the instructions for how and when to take your medications. Talk with your doctor or nurses if you are unsure of what to do.     2. Go to your follow-up appointments. Specific follow-up information is listed in the following pages. Your may be contacted by a transition nurse or clinical provider about future appointments. Be sure we have all of the phone numbers to reach you, if needed. Please contact your provider's office if you are unable to make an appointment.     3. Watch for warning signs. Your doctor or nurse will give you detailed warning signs to watch for and when to call for assistance. These instructions may also include educational information about your condition. If you experience any of warning signs to your health, call your doctor.               [facility]  Unless otherwise directed by your provider, please contact [facility], our nurse care line that is available for 24/7 assistance.     [phone] (toll-free)    Registered nurses in the [facility] provide clinical advisement, health education, appointment booking, and other advisory services.                    ** Verify the list of medication(s) below is accurate and up to date.  Carry this with you in case of emergency. If your medications have changed, please notify your healthcare provider.             Medication List      START taking these medications        Additional Info                      blood sugar diagnostic Strp   Quantity:  200 each   Refills:  11   Dose:  1 each    Instructions:  1 each by Misc.(Non-Drug; Combo Route) route 4 (four) times daily before meals and nightly.     Begin Date    AM    Noon    PM    Bedtime       blood-glucose meter Misc   Quantity:  1 each   Refills:  0   Dose:  1 each    Instructions:  1 each by Misc.(Non-Drug; Combo Route) route 4 (four) times daily before meals and nightly.     Begin Date    AM    Noon    PM    Bedtime       insulin aspart 100 unit/mL injection   Commonly known as:  NOVOLOG   Quantity:  13.5 mL   Refills:  11   Dose:  15 Units    Instructions:  Inject 15 Units into the skin 3 (three) times daily before meals.     Begin Date    AM    Noon    PM    Bedtime       insulin detemir 100 unit/mL (3 mL) Inpn pen   Commonly known as:  LEVEMIR FLEXTOUCH   Quantity:  13.5 mL   Refills:  11   Dose:  45 Units    Last time this was given:  40 Units on 2/22/2017  9:41 PM   Instructions:  Inject 45 Units into the skin every evening.     Begin Date    AM    Noon    PM    Bedtime       lancets 25 gauge Misc   Quantity:  200 each   Refills:  11   Dose:  1 lancet    Instructions:  1 lancet by Misc.(Non-Drug; Combo Route) route 4 (four) times daily before meals and nightly.     Begin Date    AM    Noon    PM    Bedtime       losartan 100 MG tablet   Commonly known as:  COZAAR   Quantity:  90 tablet   Refills:  3   Dose:  100 mg    Last time this was given:  12.5 mg on 2/22/2017  9:55 AM   Instructions:  Take 1 tablet (100 mg total) by mouth once daily.     Begin Date    AM    Noon    PM    Bedtime         CHANGE how you take these medications        Additional Info                      gabapentin 300 MG capsule   Commonly known as:  NEURONTIN    Quantity:  180 capsule   Refills:  11   Dose:  600 mg   What changed:    - when to take this  - Another medication with the same name was removed. Continue taking this medication, and follow the directions you see here.    Last time this was given:  600 mg on 2/22/2017  9:43 PM   Instructions:  Take 2 capsules (600 mg total) by mouth every evening.     Begin Date    AM    Noon    PM    Bedtime         CONTINUE taking these medications        Additional Info                      acetaZOLAMIDE 500 mg Cpsr   Commonly known as:  DIAMOX   Refills:  0   Dose:  500 mg    Last time this was given:  500 mg on 2/23/2017  9:20 AM   Instructions:  Take 500 mg by mouth 2 (two) times daily.     Begin Date    AM    Noon    PM    Bedtime       atorvastatin 20 MG tablet   Commonly known as:  LIPITOR   Quantity:  90 tablet   Refills:  1   Dose:  20 mg   Comments:  Discontinue 30 day supply    Last time this was given:  20 mg on 2/23/2017  9:21 AM   Instructions:  Take 1 tablet (20 mg total) by mouth once daily.     Begin Date    AM    Noon    PM    Bedtime       butalbital-acetaminophen-caffeine -40 mg -40 mg per tablet   Commonly known as:  FIORICET, ESGIC   Refills:  2    Instructions:  TK 1 T PO Q 8 H PRF PAIN OR HEADACHES     Begin Date    AM    Noon    PM    Bedtime       capsaicin 0.1 % Crea   Quantity:  56.6 g   Refills:  1   Dose:  1 application    Instructions:  Apply 1 application topically 2 (two) times daily.     Begin Date    AM    Noon    PM    Bedtime       EPIPEN 2-RHODA 0.3 mg/0.3 mL Atin   Refills:  1   Generic drug:  epinephrine      Begin Date    AM    Noon    PM    Bedtime       fluticasone 50 mcg/actuation nasal spray   Commonly known as:  FLONASE   Quantity:  16 g   Refills:  1   Dose:  1 spray    Instructions:  1 spray by Each Nare route daily as needed.     Begin Date    AM    Noon    PM    Bedtime       fluticasone-vilanterol 200-25 mcg/dose Dsdv diskus inhaler   Commonly known as:  BREO ELLIPTA    Quantity:  1 each   Refills:  3   Dose:  1 puff    Instructions:  Inhale 1 puff into the lungs once daily.     Begin Date    AM    Noon    PM    Bedtime       ipratropium 0.02 % nebulizer solution   Commonly known as:  ATROVENT   Quantity:  100 vial   Refills:  0   Dose:  500 mcg    Instructions:  Take 2.5 mLs (500 mcg total) by nebulization 4 (four) times daily.     Begin Date    AM    Noon    PM    Bedtime       lamotrigine 25 MG tablet   Commonly known as:  LAMICTAL   Quantity:  60 tablet   Refills:  11   Dose:  25 mg    Last time this was given:  25 mg on 2/23/2017  9:21 AM   Instructions:  Take 1 tablet (25 mg total) by mouth 2 (two) times daily.     Begin Date    AM    Noon    PM    Bedtime       levalbuterol 1.25 mg/0.5 mL nebulizer solution   Commonly known as:  XOPENEX   Quantity:  100 each   Refills:  2   Dose:  1 ampule    Instructions:  Take 0.5 mLs (1.25 mg total) by nebulization every 4 (four) hours as needed for Wheezing.     Begin Date    AM    Noon    PM    Bedtime       LINZESS 290 mcg Cap   Refills:  5   Generic drug:  linaclotide    Instructions:  TK ONE CAPSULE PO D ON AN EMPTY STOMACH     Begin Date    AM    Noon    PM    Bedtime       metformin 500 MG 24 hr tablet   Commonly known as:  GLUCOPHAGE-XR   Quantity:  90 tablet   Refills:  0    Instructions:  TAKE 1 TABLET BY MOUTH DAILY WITH BREAKFAST     Begin Date    AM    Noon    PM    Bedtime       montelukast 10 mg tablet   Commonly known as:  SINGULAIR   Quantity:  30 tablet   Refills:  5   Dose:  10 mg    Instructions:  Take 1 tablet (10 mg total) by mouth once daily.     Begin Date    AM    Noon    PM    Bedtime       nystatin-triamcinolone ointment   Commonly known as:  MYCOLOG   Quantity:  60 g   Refills:  2    Instructions:  Apply topically 2 (two) times daily.     Begin Date    AM    Noon    PM    Bedtime       pantoprazole 40 MG tablet   Commonly known as:  PROTONIX   Refills:  0    Last time this was given:  40 mg on 2/23/2017  9:21  AM   Instructions:  once daily.     Begin Date    AM    Noon    PM    Bedtime       primidone 50 MG Tab   Commonly known as:  MYSOLINE   Quantity:  90 tablet   Refills:  3   Dose:  50 mg    Instructions:  Take 1 tablet (50 mg total) by mouth every evening.     Begin Date    AM    Noon    PM    Bedtime       topiramate 200 MG Tab   Commonly known as:  TOPAMAX   Quantity:  60 tablet   Refills:  11   Dose:  200 mg    Last time this was given:  200 mg on 2/23/2017  9:20 AM   Instructions:  Take 1 tablet (200 mg total) by mouth 2 (two) times daily.     Begin Date    AM    Noon    PM    Bedtime         STOP taking these medications     losartan-hydrochlorothiazide 100-25 mg 100-25 mg per tablet   Commonly known as:  HYZAAR       oxycodone 15 MG Tab   Commonly known as:  ROXICODONE         ASK your doctor about these medications        Additional Info                      norethindrone 5 mg Tab   Commonly known as:  AYGESTIN   Quantity:  10 tablet   Refills:  12   Dose:  5 mg    Instructions:  Take 1 tablet (5 mg total) by mouth once daily. TAKE D 1-10 Q MONTH     Begin Date    AM    Noon    PM    Bedtime       TAZTIA  MG Cpsr   Refills:  2   Generic drug:  diltiaZEM    Instructions:  TK ONE CAPSULE PO D     Begin Date    AM    Noon    PM    Bedtime            Where to Get Your Medications      [facility] [address] [place] [address] [address] [address]    Hours:  24-hours Phone:  [phone]     blood sugar diagnostic Strp    blood-glucose meter Misc    insulin aspart 100 unit/mL injection    insulin detemir 100 unit/mL (3 mL) Inpn pen    lancets 25 gauge Misc    losartan 100 MG tablet         Information about where to get these medications is not yet available     ! Ask your nurse or doctor about these medications     gabapentin 300 MG capsule                  Please bring to all follow up appointments:    1. A copy  of your discharge instructions.  2. All medicines you are currently taking in their original bottles.  3. Identification and insurance card.    Please arrive 15 minutes ahead of scheduled appointment time.    Please call 24 hours in advance if you must reschedule your appointment and/or time.        Your Scheduled Appointments     Feb 24, 2017  2:45 PM CST   Established Patient Visit with Carlyle Gordillo MD   [facility] ([facility])    [address]  [place] [address]   [phone]            Feb 27, 2017  8:00 AM CST   New Patient with MICA Chowdhury - Neurology ([facility] )    [address]  [place] [address]   [phone]            Mar 03, 2017  8:00 AM CST   New Physical Therapy Patient with Gosia Gastelum PT   [facility] ([facility])    [address]  [address]  [place] [address]   [phone]            Mar 24, 2017 10:30 AM CDT   New Patient with Pj Harrell MD   [facility]    [address]  [place] [address]   [phone]            May 15, 2017  9:40 AM CDT   Neurology - Established Patient with MD Diego Lucas steve - Neurology ([facility] )    [address]  [place] [address]   [phone]              Referrals     Future Orders    Ambulatory consult to Diabetic Education     Questions:    Diagnosis:      Reason For Referral (Please Check Appropriate Boxes):      Diabetes Self-Management Education Program:          Discharge References/Attachments     OXYCODONE TABLETS OR CAPSULES (ENGLISH)    HYPERGLYCEMIA (HIGH BLOOD SUGAR) (ENGLISH)    DIABETIC KETOACIDOSIS (ENGLISH)        Primary Diagnosis     Your primary diagnosis was:  Diabetic Ketoacidosis Without Coma Associated With Type 2 Diabetes Mellitus      Admission Information     Date & Time Provider Department [facility]    2/21/2017  6:44 AM Deepa Wilson MD [facility]  [id number]      Care Providers     Provider Role Specialty Primary office phone    Deepa Wilson MD Attending Provider Hospitalist [phone]      Your Vitals Were     BP Pulse Temp Resp Height Weight    132/81 (BP Location: Right arm, Patient Position: Lying, BP Method: Automatic) 90 99 °F (37.2 °C) (Oral) 18 5' 4" (1.626 m) 164.2 kg (361 lb 14.4 oz)    SpO2 BMI             97% 62.12 kg/m2         Recent Lab Values        4/1/2014 10/19/2015 2/1/2016 10/17/2016 1/11/2017 2/21/2017            8:55 AM  9:26 AM  8:56 AM  3:08 PM 11:29 AM  7:00 AM      A1C 6.4 (H) 6.1 6.5 (H) 6.6 (H) 6.7 (H) 10.3 (H)      Comment for A1C at  8:55 AM on 4/1/2014:           Increased risk for diabetes: 5.7 - 6.4         Diabetes: >6.4         Glycemic control for adults with diabetes: <7.0    Comment for A1C at  3:08 PM on 10/17/2016:  According to ADA guidelines, hemoglobin A1C <7.0% represents  optimal control in non-pregnant diabetic patients.  Different  metrics may apply to specific populations.   Standards of Medical Care in Diabetes - 2016.  For the purpose of screening for the presence of diabetes:  <5.7%     Consistent with the absence of diabetes  5.7-6.4%  Consistent with increasing risk for diabetes   (prediabetes)  >or=6.5%  Consistent with diabetes  Currently no consensus exists for use of hemoglobin A1C  for diagnosis of diabetes for children.      Comment for A1C at 11:29 AM on 1/11/2017:  According to ADA guidelines, hemoglobin A1C <7.0% represents  optimal control in non-pregnant diabetic patients.  Different  metrics may apply to specific populations.   Standards of Medical Care in Diabetes - 2016.  For the purpose of screening for the presence of diabetes:  <5.7%     Consistent with the absence of diabetes  5.7-6.4%  Consistent with increasing risk for diabetes   (prediabetes)  >or=6.5%  Consistent with diabetes  Currently no consensus exists for use of hemoglobin A1C  for diagnosis of diabetes for children.      " Comment for A1C at  7:00 AM on 2/21/2017:  According to ADA guidelines, hemoglobin A1C <7.0% represents  optimal control in non-pregnant diabetic patients.  Different  metrics may apply to specific populations.   Standards of Medical Care in Diabetes - 2016.  For the purpose of screening for the presence of diabetes:  <5.7%     Consistent with the absence of diabetes  5.7-6.4%  Consistent with increasing risk for diabetes   (prediabetes)  >or=6.5%  Consistent with diabetes  Currently no consensus exists for use of hemoglobin A1C  for diagnosis of diabetes for children.        Allergies as of 2/23/2017     No Known Allergies      Advance Directives     An advance directive is a document which, in the event you are no longer able to make decisions for yourself, tells your healthcare team what kind of treatment you do or do not want to receive, or who you would like to make those decisions for you.  If you do not currently have an advance directive, [facility] encourages you to create one.  For more information call:  [phone]), [phone] ([phone]),  or log on to [email or web address].        Smoking Cessation     If you would like to quit smoking:   You may be eligible for free services if you are a [place] resident and started smoking cigarettes before September 1, 1988.  Call the Smoking Cessation Trust ([facility]) toll free at (715) [phone] or ([phone] [phone].   Call 8-879-QUIT-NOW if you do not meet the above criteria.            Language Assistance Services     ATTENTION: Language assistance services are available, free of charge. Please call [phone].      ATENCIÓN: Si habla español, tiene a lee disposición servicios gratuitos de asistencia lingüística. Llame al [phone].     CHÚ Ý: N?u b?n nói Ti?ng Vi?t, có các d?ch v? h? tr? ngôn ng? mi?n phí dành cho b?n. G?i s? [phone].        Chronic Kindey Disease Education             Diabetes Discharge Instructions                                    [facility] Sign-Up     Activating your [facility] account is as easy as 1-2-3!     1) Visit [email or web address], select Sign Up Now, enter this activation code and your date of birth, then select Next.  [email or web address]  Expires: 4/9/2017  1:47 PM      2) Create a username and password to use when you visit [facility] in the future and select a security question in case you lose your password and select Next.    3) Enter your e-mail address and click Sign Up!    Additional Information  If you have questions, please e-mail [email or web address] or call [phone] to talk to our [facility] staff. Remember, [facility] is NOT to be used for urgent needs. For medical emergencies, dial 911.          [facility] complies with applicable Federal civil rights laws and does not discriminate on the basis of race, color, national origin, age, disability, or sex.

## 2017-02-21 NOTE — PLAN OF CARE
02/21/17 1711   Discharge Assessment   Assessment Type Discharge Planning Assessment   Confirmed/corrected address and phone number on facesheet? Yes   Assessment information obtained from? Patient   Communicated expected length of stay with patient/caregiver no   Type of Healthcare Directive Received (N/A)   Prior to hospitilization cognitive status: Alert/Oriented   Prior to hospitalization functional status: Independent   Current cognitive status: Alert/Oriented   Current Functional Status: Independent   Lives With spouse;child(christal), adult;grandchild(christal)   Able to Return to Prior Arrangements yes   Is patient able to care for self after discharge? Yes   How many people do you have in your home that can help with your care after discharge? 2   Who are your caregiver(s) and their phone number(s)? Spouse   Patient's perception of discharge disposition home or selfcare   Readmission Within The Last 30 Days no previous admission in last 30 days   Patient currently being followed by outpatient case management? No   Patient currently receives home health services? No   Does the patient currently use HME? No   Patient currently receives private duty nursing? No   Patient currently receives any other outside agency services? No   Do you have any problems affording any of your prescribed medications? No  (Sometimes)   Is the patient taking medications as prescribed? yes   Do you have any financial concerns preventing you from receiving the healthcare you need? No   Does the patient have transportation to healthcare appointments? Yes   Transportation Available family or friend will provide   On Dialysis? No   Does the patient receive services at the Coumadin Clinic? No   Are there any open cases? No   Discharge Plan A Home with family   Discharge Plan B Home with family   Patient/Family In Agreement With Plan yes

## 2017-02-21 NOTE — ED TRIAGE NOTES
Pt presents to Ed with c/o nausea, headaches, dry mouth, and frequent urination since Friday. Pt states today her cbg was 600 at home. Pt states she take metformin for her blood sugagr and recently got the script filled after being out 3-4 days. CBG in triage was 465 today

## 2017-02-21 NOTE — ED PROVIDER NOTES
Encounter Date: 2/21/2017    SCRIBE #1 NOTE: I, Beulah Sanchez, am scribing for, and in the presence of,  Gordo Mukherjee III, MD. I have scribed the following portions of the note - Other sections scribed: HPI and ROS.       History     Chief Complaint   Patient presents with    Hyperglycemia     Pt states her cbg was 600 at home and she feels nauseated and has been thirsty .     Review of patient's allergies indicates:  No Known Allergies  HPI Comments: CC: Hyperglycemia    HPI: This 46 y.o. Female who has asthma, GERD, HTN, Seizures, Sleep apnea, DM, and Hypercholesteremia presents to the ED for an evaluation for an elevated blood sugar.  Pt reports checking her blood sugar this morning at home and reports a CBG greater than 600.  Pt reports a 5 day h/o acute onset, constant increase in polydipsia and polyuria, which prompted her to check her blood sugar this morning.  Pt also reports of associated nausea and mild epigastric abdominal pain.   Pt denies fever, chills, emesis, diarrhea, constipation, chest pain, back pain, shortness of breath, rash, or any other associated symptoms.  No prior tx.  No alleviating factors.    The history is provided by the patient. No  was used.     Past Medical History   Diagnosis Date    Allergy     Asthma     Diabetes mellitus     Diabetes mellitus, type 2     GERD (gastroesophageal reflux disease)     High cholesterol     Hypertension     Seizures     Sleep apnea      No past medical history pertinent negatives.  Past Surgical History   Procedure Laterality Date    Cholecystectomy      Sinus surgery      Gallbladder surgery       Family History   Problem Relation Age of Onset    Cancer Mother     Hypertension Mother     Diabetes Mother     Stroke Father     Heart disease Father     COPD Father     Heart attack Father     Cancer Maternal Grandmother      Social History   Substance Use Topics    Smoking status: Current Every Day Smoker      Packs/day: 1.00     Years: 15.00     Types: Cigarettes     Last attempt to quit: 6/10/2015    Smokeless tobacco: Never Used    Alcohol use No     Review of Systems   Constitutional: Negative for chills and fever.   HENT: Negative for ear pain and sore throat.    Eyes: Negative for pain.   Respiratory: Negative for cough and shortness of breath.    Cardiovascular: Negative for chest pain.   Gastrointestinal: Positive for abdominal pain and nausea. Negative for diarrhea and vomiting.   Endocrine: Positive for polydipsia and polyuria.   Genitourinary: Negative for dysuria.   Musculoskeletal: Negative for back pain and neck pain.   Skin: Negative for rash.   Neurological: Negative for weakness, numbness and headaches.       Physical Exam   Initial Vitals   BP Pulse Resp Temp SpO2   02/21/17 0631 02/21/17 0631 02/21/17 0631 02/21/17 0631 02/21/17 0631   134/82 94 18 98.8 °F (37.1 °C) 94 %     Physical Exam    Nursing note and vitals reviewed.  Constitutional: She appears well-developed and well-nourished. She is not diaphoretic. No distress.   HENT:   Head: Normocephalic and atraumatic.   Nose: Nose normal.   Mouth/Throat: Oropharynx is clear and moist. No oropharyngeal exudate.   Eyes: Conjunctivae and EOM are normal. Pupils are equal, round, and reactive to light. No scleral icterus.   Neck: Normal range of motion. Neck supple. No thyromegaly present. No tracheal deviation present.   Cardiovascular: Normal rate, regular rhythm and normal heart sounds. Exam reveals no gallop and no friction rub.    No murmur heard.  Pulmonary/Chest: Breath sounds normal. No respiratory distress. She has no wheezes. She has no rhonchi. She has no rales.   Abdominal: Soft. Bowel sounds are normal. She exhibits no distension and no mass. There is no tenderness. There is no rebound and no guarding.   Musculoskeletal: Normal range of motion. She exhibits no edema or tenderness.   Lymphadenopathy:     She has no cervical adenopathy.    Neurological: She is alert and oriented to person, place, and time. She has normal strength. No cranial nerve deficit or sensory deficit.   Skin: Skin is warm and dry. No rash noted. No erythema. No pallor.   Psychiatric: She has a normal mood and affect. Her behavior is normal. Thought content normal.         ED Course   Critical Care  Date/Time: 2/21/2017 9:14 AM  Performed by: ORACIO HERRERA III  Authorized by: ORACIO HERRERA III   Direct patient critical care time: 20 minutes  Additional history critical care time: 3 minutes  Ordering / reviewing critical care time: 8 minutes  Documentation critical care time: 7 minutes  Consulting other physicians critical care time: 8 minutes  Total critical care time (exclusive of procedural time) : 46 minutes  Critical care time was exclusive of teaching time.  Critical care was necessary to treat or prevent imminent or life-threatening deterioration of the following conditions: metabolic crisis and endocrine crisis.        Labs Reviewed   CBC W/ AUTO DIFFERENTIAL - Abnormal; Notable for the following:        Result Value    Hemoglobin 11.4 (*)     MCV 80 (*)     MCH 24.2 (*)     MCHC 30.3 (*)     RDW 19.5 (*)     Gran% 80.4 (*)     Lymph% 14.4 (*)     All other components within normal limits   COMPREHENSIVE METABOLIC PANEL - Abnormal; Notable for the following:     Sodium 128 (*)     CO2 19 (*)     Glucose 852 (*)     Creatinine 1.8 (*)     eGFR if  39 (*)     eGFR if non  34 (*)     All other components within normal limits    Narrative:      Glucose  critical result(s) called and verbal readback obtained from   Marisa London, 02/21/2017 07:48   URINALYSIS - Abnormal; Notable for the following:     Color, UA Colorless (*)     Glucose, UA 3+ (*)     Ketones, UA 1+ (*)     Leukocytes, UA Trace (*)     All other components within normal limits   BETA - HYDROXYBUTYRATE, SERUM - Abnormal; Notable for the following:     Beta-Hydroxybutyrate  2.3 (*)     All other components within normal limits   URINALYSIS MICROSCOPIC - Abnormal; Notable for the following:     WBC, UA 20 (*)     All other components within normal limits   BASIC METABOLIC PANEL - Abnormal; Notable for the following:     Glucose 466 (*)     eGFR if  53 (*)     eGFR if non  46 (*)     All other components within normal limits    Narrative:      Glucose   critical result(s) called and verbal readback obtained   from Berto Goode , 02/21/2017 12:17   POCT GLUCOSE - Abnormal; Notable for the following:     POCT Glucose 485 (*)     All other components within normal limits   ISTAT PROCEDURE - Abnormal; Notable for the following:     POC PH 7.295 (*)     POC PO2 71 (*)     POC HCO3 19.9 (*)     POC SATURATED O2 92 (*)     POC TCO2 21 (*)     All other components within normal limits   POCT GLUCOSE - Abnormal; Notable for the following:     POCT Glucose >500 (*)     All other components within normal limits   POCT GLUCOSE - Abnormal; Notable for the following:     POCT Glucose >500 (*)     All other components within normal limits   POCT GLUCOSE - Abnormal; Notable for the following:     POCT Glucose 453 (*)     All other components within normal limits   POCT URINE PREGNANCY             Medical Decision Making:   Initial Assessment:   46-year-old female with diabetes on metformin presents complaining that she has dry mouth and polyuria and that she checked her blood glucose at home this morning and it was >600.  Physical examination essentially unremarkable, with well-appearing patient, no abdominal tenderness, normal breath sounds, no peripheral edema.   Differential Diagnosis:   Unlikely DKA, HHS, but will screen for these.  Will also screen for dehydration, left side abnormality, renal insufficiency.  ED Management:  Patient's laboratory evaluation reveals severe hyperglycemia with an elevated beta hydroxybutyrate level, and acidemia.  Will treat for  mild/early DKA with IV insulin, IV fluids, potassium repletion.  I have discussed this case with Dr. Hinojosa, and we agreed that this patient does not require ICU admission.             Scribe Attestation:   Scribe #1: I performed the above scribed service and the documentation accurately describes the services I performed. I attest to the accuracy of the note.    Attending Attestation:           Physician Attestation for Scribe:  Physician Attestation Statement for Scribe #1: I, Gordo Mukherjee III, MD, reviewed documentation, as scribed by Beulah Sanchez in my presence, and it is both accurate and complete.                 ED Course     Clinical Impression:   The primary encounter diagnosis was Diabetic ketoacidosis without coma associated with type 2 diabetes mellitus. Diagnoses of Dehydration and Convulsions, unspecified convulsion type were also pertinent to this visit.          Gordo Mukherjee III, MD  02/24/17 0312

## 2017-02-21 NOTE — NURSING
Received patient from ER to room via WC. Patient accompanied by transport and family. Transferred patient to bed. Evaluated general patient appearance and condition. Admit assessment initiated. Tele monitoring initiated. Saline lock at R Hand and RAC Intact. No apparent distress noted at this time.

## 2017-02-22 PROBLEM — R20.0 NUMBNESS OF RIGHT HAND: Status: ACTIVE | Noted: 2017-02-22

## 2017-02-22 LAB
ALBUMIN SERPL BCP-MCNC: 3.4 G/DL
ALP SERPL-CCNC: 105 U/L
ALT SERPL W/O P-5'-P-CCNC: 18 U/L
ANION GAP SERPL CALC-SCNC: 7 MMOL/L
ANION GAP SERPL CALC-SCNC: 7 MMOL/L
AST SERPL-CCNC: 10 U/L
BASOPHILS # BLD AUTO: 0.04 K/UL
BASOPHILS NFR BLD: 0.5 %
BILIRUB SERPL-MCNC: 0.3 MG/DL
BUN SERPL-MCNC: 20 MG/DL
BUN SERPL-MCNC: 20 MG/DL
CALCIUM SERPL-MCNC: 8.7 MG/DL
CALCIUM SERPL-MCNC: 8.7 MG/DL
CHLORIDE SERPL-SCNC: 105 MMOL/L
CHLORIDE SERPL-SCNC: 105 MMOL/L
CO2 SERPL-SCNC: 25 MMOL/L
CO2 SERPL-SCNC: 25 MMOL/L
CREAT SERPL-MCNC: 1.3 MG/DL
CREAT SERPL-MCNC: 1.3 MG/DL
DIFFERENTIAL METHOD: ABNORMAL
EOSINOPHIL # BLD AUTO: 0.1 K/UL
EOSINOPHIL NFR BLD: 1.2 %
ERYTHROCYTE [DISTWIDTH] IN BLOOD BY AUTOMATED COUNT: 20.1 %
EST. GFR  (AFRICAN AMERICAN): 58 ML/MIN/1.73 M^2
EST. GFR  (AFRICAN AMERICAN): 58 ML/MIN/1.73 M^2
EST. GFR  (NON AFRICAN AMERICAN): 50 ML/MIN/1.73 M^2
EST. GFR  (NON AFRICAN AMERICAN): 50 ML/MIN/1.73 M^2
ESTIMATED AVG GLUCOSE: 249 MG/DL
GLUCOSE SERPL-MCNC: 364 MG/DL
GLUCOSE SERPL-MCNC: 364 MG/DL
HBA1C MFR BLD HPLC: 10.3 %
HCT VFR BLD AUTO: 34.5 %
HGB BLD-MCNC: 10.5 G/DL
LYMPHOCYTES # BLD AUTO: 2.3 K/UL
LYMPHOCYTES NFR BLD: 27.7 %
MCH RBC QN AUTO: 23.8 PG
MCHC RBC AUTO-ENTMCNC: 30.4 %
MCV RBC AUTO: 78 FL
MONOCYTES # BLD AUTO: 0.8 K/UL
MONOCYTES NFR BLD: 9.9 %
NEUTROPHILS # BLD AUTO: 5.1 K/UL
NEUTROPHILS NFR BLD: 60.2 %
PLATELET # BLD AUTO: 385 K/UL
PMV BLD AUTO: 10 FL
POCT GLUCOSE: 348 MG/DL (ref 70–110)
POCT GLUCOSE: 395 MG/DL (ref 70–110)
POCT GLUCOSE: 415 MG/DL (ref 70–110)
POCT GLUCOSE: 445 MG/DL (ref 70–110)
POCT GLUCOSE: 446 MG/DL (ref 70–110)
POCT GLUCOSE: 466 MG/DL (ref 70–110)
POCT GLUCOSE: >500 MG/DL (ref 70–110)
POTASSIUM SERPL-SCNC: 3.9 MMOL/L
POTASSIUM SERPL-SCNC: 3.9 MMOL/L
PROT SERPL-MCNC: 7.3 G/DL
RBC # BLD AUTO: 4.41 M/UL
SODIUM SERPL-SCNC: 137 MMOL/L
SODIUM SERPL-SCNC: 137 MMOL/L
WBC # BLD AUTO: 8.39 K/UL

## 2017-02-22 PROCEDURE — 63600175 PHARM REV CODE 636 W HCPCS: Performed by: INTERNAL MEDICINE

## 2017-02-22 PROCEDURE — 63600175 PHARM REV CODE 636 W HCPCS: Performed by: EMERGENCY MEDICINE

## 2017-02-22 PROCEDURE — 25000003 PHARM REV CODE 250: Performed by: EMERGENCY MEDICINE

## 2017-02-22 PROCEDURE — 25000003 PHARM REV CODE 250: Performed by: INTERNAL MEDICINE

## 2017-02-22 PROCEDURE — 63600175 PHARM REV CODE 636 W HCPCS: Performed by: HOSPITALIST

## 2017-02-22 PROCEDURE — 80053 COMPREHEN METABOLIC PANEL: CPT

## 2017-02-22 PROCEDURE — 25000003 PHARM REV CODE 250: Performed by: HOSPITALIST

## 2017-02-22 PROCEDURE — 21400001 HC TELEMETRY ROOM

## 2017-02-22 PROCEDURE — 85025 COMPLETE CBC W/AUTO DIFF WBC: CPT

## 2017-02-22 PROCEDURE — 36415 COLL VENOUS BLD VENIPUNCTURE: CPT

## 2017-02-22 RX ORDER — DIPHENHYDRAMINE HCL 50 MG
50 CAPSULE ORAL ONCE
Status: COMPLETED | OUTPATIENT
Start: 2017-02-22 | End: 2017-02-22

## 2017-02-22 RX ORDER — OXYCODONE HYDROCHLORIDE 5 MG/1
5 TABLET ORAL EVERY 6 HOURS PRN
Status: DISCONTINUED | OUTPATIENT
Start: 2017-02-22 | End: 2017-02-23

## 2017-02-22 RX ORDER — LIDOCAINE HYDROCHLORIDE 20 MG/ML
1.5 INJECTION, SOLUTION EPIDURAL; INFILTRATION; INTRACAUDAL; PERINEURAL ONCE
Status: COMPLETED | OUTPATIENT
Start: 2017-02-22 | End: 2017-02-20

## 2017-02-22 RX ORDER — IBUPROFEN 200 MG
16 TABLET ORAL
Status: DISCONTINUED | OUTPATIENT
Start: 2017-02-22 | End: 2017-02-23 | Stop reason: HOSPADM

## 2017-02-22 RX ORDER — IBUPROFEN 200 MG
24 TABLET ORAL
Status: DISCONTINUED | OUTPATIENT
Start: 2017-02-22 | End: 2017-02-23 | Stop reason: HOSPADM

## 2017-02-22 RX ORDER — INSULIN ASPART 100 [IU]/ML
12 INJECTION, SOLUTION INTRAVENOUS; SUBCUTANEOUS
Status: DISCONTINUED | OUTPATIENT
Start: 2017-02-22 | End: 2017-02-23 | Stop reason: HOSPADM

## 2017-02-22 RX ORDER — GLUCAGON 1 MG
1 KIT INJECTION
Status: DISCONTINUED | OUTPATIENT
Start: 2017-02-22 | End: 2017-02-23 | Stop reason: HOSPADM

## 2017-02-22 RX ORDER — DIPHENHYDRAMINE HCL 12.5MG/5ML
6.25 ELIXIR ORAL EVERY 6 HOURS PRN
Status: DISCONTINUED | OUTPATIENT
Start: 2017-02-22 | End: 2017-02-23 | Stop reason: HOSPADM

## 2017-02-22 RX ORDER — INSULIN ASPART 100 [IU]/ML
1-10 INJECTION, SOLUTION INTRAVENOUS; SUBCUTANEOUS
Status: DISCONTINUED | OUTPATIENT
Start: 2017-02-22 | End: 2017-02-23 | Stop reason: HOSPADM

## 2017-02-22 RX ORDER — BUPIVACAINE HYDROCHLORIDE 5 MG/ML
1.5 INJECTION, SOLUTION EPIDURAL; INTRACAUDAL ONCE
Status: COMPLETED | OUTPATIENT
Start: 2017-02-22 | End: 2017-02-20

## 2017-02-22 RX ORDER — DEXAMETHASONE SODIUM PHOSPHATE 4 MG/ML
4 INJECTION, SOLUTION INTRA-ARTICULAR; INTRALESIONAL; INTRAMUSCULAR; INTRAVENOUS; SOFT TISSUE
Status: COMPLETED | OUTPATIENT
Start: 2017-02-22 | End: 2017-02-20

## 2017-02-22 RX ORDER — TRIAMCINOLONE ACETONIDE 40 MG/ML
40 INJECTION, SUSPENSION INTRA-ARTICULAR; INTRAMUSCULAR
Status: COMPLETED | OUTPATIENT
Start: 2017-02-22 | End: 2017-02-20

## 2017-02-22 RX ADMIN — GABAPENTIN 600 MG: 300 CAPSULE ORAL at 05:02

## 2017-02-22 RX ADMIN — INSULIN HUMAN 11 UNITS: 100 INJECTION, SOLUTION PARENTERAL at 02:02

## 2017-02-22 RX ADMIN — INSULIN ASPART 18 UNITS: 100 INJECTION, SOLUTION INTRAVENOUS; SUBCUTANEOUS at 10:02

## 2017-02-22 RX ADMIN — LOSARTAN POTASSIUM 12.5 MG: 25 TABLET, FILM COATED ORAL at 09:02

## 2017-02-22 RX ADMIN — LAMOTRIGINE 25 MG: 25 TABLET ORAL at 09:02

## 2017-02-22 RX ADMIN — PANTOPRAZOLE SODIUM 40 MG: 40 TABLET, DELAYED RELEASE ORAL at 09:02

## 2017-02-22 RX ADMIN — ACETAZOLAMIDE 500 MG: 500 CAPSULE, EXTENDED RELEASE ORAL at 10:02

## 2017-02-22 RX ADMIN — INSULIN ASPART 10 UNITS: 100 INJECTION, SOLUTION INTRAVENOUS; SUBCUTANEOUS at 04:02

## 2017-02-22 RX ADMIN — GABAPENTIN 600 MG: 300 CAPSULE ORAL at 01:02

## 2017-02-22 RX ADMIN — DIPHENHYDRAMINE HYDROCHLORIDE 50 MG: 50 CAPSULE ORAL at 02:02

## 2017-02-22 RX ADMIN — GABAPENTIN 600 MG: 300 CAPSULE ORAL at 09:02

## 2017-02-22 RX ADMIN — TOPIRAMATE 200 MG: 100 TABLET, FILM COATED ORAL at 09:02

## 2017-02-22 RX ADMIN — SODIUM CHLORIDE: 0.9 INJECTION, SOLUTION INTRAVENOUS at 12:02

## 2017-02-22 RX ADMIN — INSULIN ASPART 12 UNITS: 100 INJECTION, SOLUTION INTRAVENOUS; SUBCUTANEOUS at 01:02

## 2017-02-22 RX ADMIN — INSULIN ASPART 8 UNITS: 100 INJECTION, SOLUTION INTRAVENOUS; SUBCUTANEOUS at 09:02

## 2017-02-22 RX ADMIN — INSULIN ASPART 5 UNITS: 100 INJECTION, SOLUTION INTRAVENOUS; SUBCUTANEOUS at 01:02

## 2017-02-22 RX ADMIN — ATORVASTATIN CALCIUM 20 MG: 10 TABLET, FILM COATED ORAL at 09:02

## 2017-02-22 RX ADMIN — ENOXAPARIN SODIUM 40 MG: 100 INJECTION SUBCUTANEOUS at 01:02

## 2017-02-22 RX ADMIN — INSULIN ASPART 12 UNITS: 100 INJECTION, SOLUTION INTRAVENOUS; SUBCUTANEOUS at 04:02

## 2017-02-22 RX ADMIN — ACETAZOLAMIDE 500 MG: 500 CAPSULE, EXTENDED RELEASE ORAL at 09:02

## 2017-02-22 NOTE — NURSING
Spoke with Dr Rubin and explained what patient reports to me about her itching, glucose of 445. Orders received for benadryl 50 PO once, and REGULAR insulin 11 units now, recheck accucheck one hour after dose.

## 2017-02-22 NOTE — SUBJECTIVE & OBJECTIVE
Past Medical History   Diagnosis Date    Allergy     Asthma     Diabetes mellitus     Diabetes mellitus, type 2     GERD (gastroesophageal reflux disease)     High cholesterol     Hypertension     Seizures     Sleep apnea        Past Surgical History   Procedure Laterality Date    Cholecystectomy      Sinus surgery      Gallbladder surgery         Review of patient's allergies indicates:  No Known Allergies    No current facility-administered medications on file prior to encounter.      Current Outpatient Prescriptions on File Prior to Encounter   Medication Sig    acetaZOLAMIDE (DIAMOX) 500 mg CpSR Take 500 mg by mouth 2 (two) times daily.    atorvastatin (LIPITOR) 20 MG tablet Take 1 tablet (20 mg total) by mouth once daily.    butalbital-acetaminophen-caffeine -40 mg (FIORICET, ESGIC) -40 mg per tablet TK 1 T PO Q 8 H PRF PAIN OR HEADACHES    fluticasone (FLONASE) 50 mcg/actuation nasal spray 1 spray by Each Nare route daily as needed.    fluticasone-vilanterol (BREO ELLIPTA) 200-25 mcg/dose DsDv diskus inhaler Inhale 1 puff into the lungs once daily.    gabapentin (NEURONTIN) 300 MG capsule Take 2 capsules (600 mg total) by mouth 3 (three) times daily.    ipratropium (ATROVENT) 0.02 % nebulizer solution Take 2.5 mLs (500 mcg total) by nebulization 4 (four) times daily.    lamotrigine (LAMICTAL) 25 MG tablet Take 1 tablet (25 mg total) by mouth 2 (two) times daily.    levalbuterol (XOPENEX) 1.25 mg/0.5 mL nebulizer solution Take 0.5 mLs (1.25 mg total) by nebulization every 4 (four) hours as needed for Wheezing.    LINZESS 290 mcg Cap TK ONE CAPSULE PO D ON AN EMPTY STOMACH    losartan-hydrochlorothiazide 100-25 mg (HYZAAR) 100-25 mg per tablet Take 1 tablet by mouth once daily.    metformin (GLUCOPHAGE-XR) 500 MG 24 hr tablet TAKE 1 TABLET BY MOUTH DAILY WITH BREAKFAST    montelukast (SINGULAIR) 10 mg tablet Take 1 tablet (10 mg total) by mouth once daily.    oxycodone  (ROXICODONE) 15 MG Tab Take 1 tablet (15 mg total) by mouth every 6 (six) hours as needed.    pantoprazole (PROTONIX) 40 MG tablet once daily.     primidone (MYSOLINE) 50 MG Tab Take 1 tablet (50 mg total) by mouth every evening.    TAZTIA  mg CpSR TK ONE CAPSULE PO D    topiramate (TOPAMAX) 200 MG Tab Take 1 tablet (200 mg total) by mouth 2 (two) times daily.    capsaicin 0.1 % Crea Apply 1 application topically 2 (two) times daily.    EPIPEN 2-RHODA 0.3 mg/0.3 mL (1:1,000) AtIn     norethindrone (AYGESTIN) 5 mg Tab Take 1 tablet (5 mg total) by mouth once daily. TAKE D 1-10 Q MONTH    nystatin-triamcinolone (MYCOLOG) ointment Apply topically 2 (two) times daily.    [DISCONTINUED] FLUTICASONE/SALMETEROL (ADVAIR DISKUS INHL) Inhale into the lungs.    [DISCONTINUED] gabapentin (NEURONTIN) 300 MG capsule TAKE 2 CAPSULES BY MOUTH THREE TIMES DAILY     Family History     Problem Relation (Age of Onset)    COPD Father    Cancer Mother, Maternal Grandmother    Diabetes Mother    Heart attack Father    Heart disease Father    Hypertension Mother    Stroke Father        Social History Main Topics    Smoking status: Current Every Day Smoker     Packs/day: 1.00     Years: 15.00     Types: Cigarettes     Last attempt to quit: 6/10/2015    Smokeless tobacco: Never Used    Alcohol use No    Drug use: No    Sexual activity: Yes     Partners: Male     Birth control/ protection: None     Review of Systems   Constitutional: Negative for chills and fever.   HENT: Negative.    Eyes: Negative.    Respiratory: Negative.    Cardiovascular: Negative.    Gastrointestinal: Positive for abdominal pain and nausea. Negative for diarrhea and vomiting.   Endocrine: Positive for polydipsia and polyuria.   Genitourinary: Negative.    Musculoskeletal: Negative.    Allergic/Immunologic: Negative.    Neurological: Negative.    Hematological: Negative.    Psychiatric/Behavioral: Negative.      Objective:     Vital Signs (Most  Recent):  Temp: 97.3 °F (36.3 °C) (02/21/17 1700)  Pulse: 77 (02/21/17 1700)  Resp: 19 (02/21/17 1700)  BP: 118/72 (02/21/17 1700)  SpO2: 96 % (02/21/17 1053) Vital Signs (24h Range):  Temp:  [96.4 °F (35.8 °C)-98.8 °F (37.1 °C)] 97.3 °F (36.3 °C)  Pulse:  [77-94] 77  Resp:  [18-20] 19  SpO2:  [94 %-96 %] 96 %  BP: ()/(52-93) 118/72     Weight: (!) 164.7 kg (363 lb)  Body mass index is 62.31 kg/(m^2).    Physical Exam   Constitutional: She is oriented to person, place, and time. She appears well-developed. No distress.   HENT:   Head: Normocephalic and atraumatic.   Mouth/Throat: Oropharynx is clear and moist.   Eyes: Conjunctivae and EOM are normal. No scleral icterus.   Neck: Normal range of motion. Neck supple.   Cardiovascular: Normal rate and regular rhythm.    Pulmonary/Chest: Effort normal and breath sounds normal.   Abdominal: Soft. Bowel sounds are normal. She exhibits no distension. There is no tenderness.   Musculoskeletal: Normal range of motion.   Neurological: She is alert and oriented to person, place, and time.   Skin: Skin is warm and dry. She is not diaphoretic. No pallor.   Psychiatric: She has a normal mood and affect. Her behavior is normal. Judgment and thought content normal.   Vitals reviewed.       Significant Labs: All pertinent labs within the past 24 hours have been reviewed.    Significant Imaging: I have reviewed all pertinent imaging results/findings within the past 24 hours.

## 2017-02-22 NOTE — NURSING
"Offered patient her night medicine, but she wants to go walk to visit a friend instead. Patient then tells me, "Why you didn't come when I called? I want to be unhooked form the iv I called 3 times." Apologized to patient and explained I was on the phone with the doctor. Patient does not appear happy.   "

## 2017-02-22 NOTE — NURSING
Patient requesting private room,  and family member with patient. Notified Charge Nurse. Patient aware there are no open rooms at this time and we would accommodate her if possible.

## 2017-02-22 NOTE — ASSESSMENT & PLAN NOTE
A mild case of DKA that responded well to insulin given in the ED. Currently adjusting insulin for better BG control. Goal is 140-180 while inpatient. A1c not reliable given chronic anemia that is at baseline. Hold HCTZ as it can also cause hyperglycemia

## 2017-02-22 NOTE — ASSESSMENT & PLAN NOTE
This was addressed as outpatient. Not interested in evaluation by bariatrics. Will need nutrition consult as outpatient. Would benefit from [facility] weight loss program

## 2017-02-22 NOTE — SUBJECTIVE & OBJECTIVE
Interval History: right hand numbness, but no paresthesia or pain. IV access in on area of complaint. Hyperglycemia    Review of Systems   Respiratory: Negative.    Cardiovascular: Negative.    Gastrointestinal: Negative.    Endocrine: Negative.    Neurological:        Paresthesia     Objective:     Vital Signs (Most Recent):  Temp: 97.4 °F (36.3 °C) (02/22/17 1652)  Pulse: 83 (02/22/17 1652)  Resp: 20 (02/22/17 1652)  BP: 124/86 (02/22/17 1652)  SpO2: 96 % (02/22/17 1652) Vital Signs (24h Range):  Temp:  [97.4 °F (36.3 °C)-98.8 °F (37.1 °C)] 97.4 °F (36.3 °C)  Pulse:  [76-86] 83  Resp:  [18-20] 20  SpO2:  [91 %-97 %] 96 %  BP: (109-136)/(54-90) 124/86     Weight: (!) 163.3 kg (360 lb)  Body mass index is 61.79 kg/(m^2).    Intake/Output Summary (Last 24 hours) at 02/22/17 1739  Last data filed at 02/22/17 0933   Gross per 24 hour   Intake           3472.5 ml   Output              400 ml   Net           3072.5 ml      Physical Exam   Constitutional: She is oriented to person, place, and time. She appears well-developed. No distress.   Cardiovascular: Normal rate and regular rhythm.    Pulmonary/Chest: Effort normal and breath sounds normal.   Abdominal: Soft. Bowel sounds are normal.   Musculoskeletal: Normal range of motion. She exhibits edema (mild, on right dorsum of hand).   Neurological: She is alert and oriented to person, place, and time.   Numbness on dorsum of right hand   Skin: She is not diaphoretic.   Vitals reviewed.      Significant Labs: All pertinent labs within the past 24 hours have been reviewed.    Significant Imaging: I have reviewed all pertinent imaging results/findings within the past 24 hours.

## 2017-02-22 NOTE — ASSESSMENT & PLAN NOTE
This was addressed as outpatient. Not interested in evaluation by bariatrics. Will need nutrition consult as outpatient. Would benefit from [place] weight loss program

## 2017-02-22 NOTE — H&P
[facility] Medicine  History & Physical    Patient Name: Yanni Kaiser  MRN: [id number]  Admission Date: 2/21/2017  Attending Physician: Deepa Wilson MD   Primary Care Provider: Carlyle Gordillo MD         Patient information was obtained from patient and ER records.     Subjective:     Principal Problem:Diabetic ketoacidosis without coma associated with type 2 diabetes mellitus    Chief Complaint:   Chief Complaint   Patient presents with    Hyperglycemia     Pt states her cbg was 600 at home and she feels nauseated and has been thirsty .        HPI: 46 year old female who is morbidly obese and has diabetes mellitus type 2 (reported controlled), idiopathic intracranial HTN, seizure disorder, essential HTN and mild asthma who presented with nausea and abdominal discomfort. This was associated with one week of polyuria and polydipsia. Was found to be severely hyperglycemic to 800s and with a mild case of DKA that responded to insulin aspart given in the ED. Also with mild elevation of Cr from baseline. Is on HCTZ and losartan for BP control, and on metformin for DM2. Last A1c appeared to show controlled diabetes, however patient has chronic anemia. Denied any other symptoms.    Past Medical History   Diagnosis Date    Allergy     Asthma     Diabetes mellitus     Diabetes mellitus, type 2     GERD (gastroesophageal reflux disease)     High cholesterol     Hypertension     Seizures     Sleep apnea        Past Surgical History   Procedure Laterality Date    Cholecystectomy      Sinus surgery      Gallbladder surgery         Review of patient's allergies indicates:  No Known Allergies    No current facility-administered medications on file prior to encounter.      Current Outpatient Prescriptions on File Prior to Encounter   Medication Sig    acetaZOLAMIDE (DIAMOX) 500 mg CpSR Take 500 mg by mouth 2 (two) times daily.    atorvastatin (LIPITOR) 20 MG tablet Take 1  tablet (20 mg total) by mouth once daily.    butalbital-acetaminophen-caffeine -40 mg (FIORICET, ESGIC) -40 mg per tablet TK 1 T PO Q 8 H PRF PAIN OR HEADACHES    fluticasone (FLONASE) 50 mcg/actuation nasal spray 1 spray by Each Nare route daily as needed.    fluticasone-vilanterol (BREO ELLIPTA) 200-25 mcg/dose DsDv diskus inhaler Inhale 1 puff into the lungs once daily.    gabapentin (NEURONTIN) 300 MG capsule Take 2 capsules (600 mg total) by mouth 3 (three) times daily.    ipratropium (ATROVENT) 0.02 % nebulizer solution Take 2.5 mLs (500 mcg total) by nebulization 4 (four) times daily.    lamotrigine (LAMICTAL) 25 MG tablet Take 1 tablet (25 mg total) by mouth 2 (two) times daily.    levalbuterol (XOPENEX) 1.25 mg/0.5 mL nebulizer solution Take 0.5 mLs (1.25 mg total) by nebulization every 4 (four) hours as needed for Wheezing.    LINZESS 290 mcg Cap TK ONE CAPSULE PO D ON AN EMPTY STOMACH    losartan-hydrochlorothiazide 100-25 mg (HYZAAR) 100-25 mg per tablet Take 1 tablet by mouth once daily.    metformin (GLUCOPHAGE-XR) 500 MG 24 hr tablet TAKE 1 TABLET BY MOUTH DAILY WITH BREAKFAST    montelukast (SINGULAIR) 10 mg tablet Take 1 tablet (10 mg total) by mouth once daily.    oxycodone (ROXICODONE) 15 MG Tab Take 1 tablet (15 mg total) by mouth every 6 (six) hours as needed.    pantoprazole (PROTONIX) 40 MG tablet once daily.     primidone (MYSOLINE) 50 MG Tab Take 1 tablet (50 mg total) by mouth every evening.    TAZTIA  mg CpSR TK ONE CAPSULE PO D    topiramate (TOPAMAX) 200 MG Tab Take 1 tablet (200 mg total) by mouth 2 (two) times daily.    capsaicin 0.1 % Crea Apply 1 application topically 2 (two) times daily.    EPIPEN 2-RHODA 0.3 mg/0.3 mL (1:1,000) AtIn     norethindrone (AYGESTIN) 5 mg Tab Take 1 tablet (5 mg total) by mouth once daily. TAKE D 1-10 Q MONTH    nystatin-triamcinolone (MYCOLOG) ointment Apply topically 2 (two) times daily.    [DISCONTINUED]  FLUTICASONE/SALMETEROL (ADVAIR DISKUS INHL) Inhale into the lungs.    [DISCONTINUED] gabapentin (NEURONTIN) 300 MG capsule TAKE 2 CAPSULES BY MOUTH THREE TIMES DAILY     Family History     Problem Relation (Age of Onset)    COPD Father    Cancer Mother, Maternal Grandmother    Diabetes Mother    Heart attack Father    Heart disease Father    Hypertension Mother    Stroke Father        Social History Main Topics    Smoking status: Current Every Day Smoker     Packs/day: 1.00     Years: 15.00     Types: Cigarettes     Last attempt to quit: 6/10/2015    Smokeless tobacco: Never Used    Alcohol use No    Drug use: No    Sexual activity: Yes     Partners: Male     Birth control/ protection: None     Review of Systems   Constitutional: Negative for chills and fever.   HENT: Negative.    Eyes: Negative.    Respiratory: Negative.    Cardiovascular: Negative.    Gastrointestinal: Positive for abdominal pain and nausea. Negative for diarrhea and vomiting.   Endocrine: Positive for polydipsia and polyuria.   Genitourinary: Negative.    Musculoskeletal: Negative.    Allergic/Immunologic: Negative.    Neurological: Negative.    Hematological: Negative.    Psychiatric/Behavioral: Negative.      Objective:     Vital Signs (Most Recent):  Temp: 97.3 °F (36.3 °C) (02/21/17 1700)  Pulse: 77 (02/21/17 1700)  Resp: 19 (02/21/17 1700)  BP: 118/72 (02/21/17 1700)  SpO2: 96 % (02/21/17 1053) Vital Signs (24h Range):  Temp:  [96.4 °F (35.8 °C)-98.8 °F (37.1 °C)] 97.3 °F (36.3 °C)  Pulse:  [77-94] 77  Resp:  [18-20] 19  SpO2:  [94 %-96 %] 96 %  BP: ()/(52-93) 118/72     Weight: (!) 164.7 kg (363 lb)  Body mass index is 62.31 kg/(m^2).    Physical Exam   Constitutional: She is oriented to person, place, and time. She appears well-developed. No distress.   HENT:   Head: Normocephalic and atraumatic.   Mouth/Throat: Oropharynx is clear and moist.   Eyes: Conjunctivae and EOM are normal. No scleral icterus.   Neck: Normal range of  motion. Neck supple.   Cardiovascular: Normal rate and regular rhythm.    Pulmonary/Chest: Effort normal and breath sounds normal.   Abdominal: Soft. Bowel sounds are normal. She exhibits no distension. There is no tenderness.   Musculoskeletal: Normal range of motion.   Neurological: She is alert and oriented to person, place, and time.   Skin: Skin is warm and dry. She is not diaphoretic. No pallor.   Psychiatric: She has a normal mood and affect. Her behavior is normal. Judgment and thought content normal.   Vitals reviewed.       Significant Labs: All pertinent labs within the past 24 hours have been reviewed.    Significant Imaging: I have reviewed all pertinent imaging results/findings within the past 24 hours.    Assessment/Plan:     * Diabetic ketoacidosis without coma associated with type 2 diabetes mellitus  A mild case of DKA that responded well to insulin given in the ED. Currently adjusting insulin for better BG control. Goal is 140-180 while inpatient. A1c not reliable given chronic anemia that is at baseline. Hold HCTZ as it can also cause hyperglycemia      DUSTIN (acute kidney injury)  Likely pre-renal. IVFs      Seizure disorder  Continue home meds      Migraine  Continue home meds      Morbid obesity with BMI of 60.0-69.9, adult  This was addressed as outpatient. Not interested in evaluation by bariatrics. Will need nutrition consult as outpatient. Would benefit from [place] weight loss program      Anemia of chronic disease  At baseline      Idiopathic intracranial hypertension  Continue home meds      Essential hypertension  Hold losartan and HCTZ for now. BP currently at goal      GERD (gastroesophageal reflux disease)  Home PPI      VTE Risk Mitigation         Ordered     enoxaparin injection 40 mg  Daily     Route:  Subcutaneous        02/21/17 1319     Medium Risk of VTE  Once      02/21/17 1319     Place sequential compression device  Until discontinued      02/21/17 1319     Place DANA hose   Until discontinued      02/21/17 2203        Deepa Hinojosa MD  Department of Hospital Medicine   [facility]

## 2017-02-22 NOTE — NURSING
"Patient called me back in to the room to report she is itching. Awaiting call from Dr Rubin. Explained this to patient. Patient states, "But I'm itching, you need to do something." Asked patient if there was something specific I could do for her other than medicine. She said "I don't know." Explained to patient I would let her know what Dr Rubin wants to do as soon as he calls back.  "

## 2017-02-22 NOTE — NURSING
Patient states she is finished walking around and ready to be hooked up to her iv fluids. States she is not ready for her pain medicine. Plan to bring it in with her night medicines at 2200, patient agrees with plan.

## 2017-02-22 NOTE — PLAN OF CARE
Problem: Diabetes, Type 2 (Adult)  Intervention: Support/Optimize Psychosocial Response to Condition    02/21/17 2011   Coping/Psychosocial Interventions   Supportive Measures active listening utilized;counseling provided   Environmental Support calm environment promoted       Intervention: Optimize Glycemic Control    02/21/17 2011   Nutrition Interventions   Glycemic Management blood glucose monitoring;supplemental insulin given;oral hydration promoted         Goal: Signs and Symptoms of Listed Potential Problems Will be Absent, Minimized or Managed (Diabetes, Type 2)  Signs and symptoms of listed potential problems will be absent, minimized or managed by discharge/transition of care (reference Diabetes, Type 2 (Adult) CPG).   Outcome: Ongoing (interventions implemented as appropriate)    02/21/17 2011   Diabetes, Type 2   Problems Assessed (Type 2 Diabetes) all   Problems Present (Type 2 Diabetes) hyperglycemia

## 2017-02-22 NOTE — ASSESSMENT & PLAN NOTE
A mild case of DKA that responded well to insulin given in the ED. Currently adjusting insulin for better BG control. Goal is 140-180 while inpatient. A1c not reliable given chronic anemia that is at baseline, but it is actually higher this time, meeting criteria for insulin as outaptient. Ok to also continue metformin at discharge. Hold HCTZ as it can also cause hyperglycemia

## 2017-02-22 NOTE — NURSING
"Patient complains of itching, requesting the nurse to bring her something. Accucheck done, patient glucose 441. Education handouts given to patient on ketoacidosis, patient not receptive to teaching. Body cream given to patient. Patient threw the cream on her bedside table. States, "What you going to do, rub cream all over my body? I'm itching, bring me something." Patient states she does not have allergies to medicine. Received oxycodone @ 22:45. Patient does not think she is itching because of the oxycodone or her sugar. "Im itching because you gave me insulin. I'm peeing because of the medicine you giving me. Why I need these fluids." Informed patient I will call the doctor to let them know what she feels.   "

## 2017-02-22 NOTE — PROGRESS NOTES
[facility] Medicine  Progress Note    Patient Name: Yanni Kaiser  MRN: [id number]  Patient Class: IP- Inpatient   Admission Date: 2/21/2017  Length of Stay: 1 days  Attending Physician: Deepa Wilson MD  Primary Care Provider: Carlyle Gordillo MD        Subjective:     Principal Problem:Diabetic ketoacidosis without coma associated with type 2 diabetes mellitus    HPI:  46 year old female who is morbidly obese and has diabetes mellitus type 2 (reported controlled), idiopathic intracranial HTN, seizure disorder, essential HTN and mild asthma who presented with nausea and abdominal discomfort. This was associated with one week of polyuria and polydipsia. Was found to be severely hyperglycemic to 800s and with a mild case of DKA that responded to insulin aspart given in the ED. Also with mild elevation of Cr from baseline. Is on HCTZ and losartan for BP control, and on metformin for DM2. Last A1c appeared to show controlled diabetes, however patient has chronic anemia. Denied any other symptoms.    Hospital Course:  Ms Kaiser presented with a mild case of DKA and significant hyperglycemia. Is on metformin only but has shown not to be compliant with much of her medical therapy. Is morbidly obese and is not on a specific diet. Gap and metabolic acidosis both quickly responded to 24 units of aspart given in the ED. Hyperglycemia remained an issue    Interval History: right hand numbness, but no paresthesia or pain. IV access in on area of complaint. Hyperglycemia    Review of Systems   Respiratory: Negative.    Cardiovascular: Negative.    Gastrointestinal: Negative.    Endocrine: Negative.    Neurological:        Paresthesia     Objective:     Vital Signs (Most Recent):  Temp: 97.4 °F (36.3 °C) (02/22/17 1652)  Pulse: 83 (02/22/17 1652)  Resp: 20 (02/22/17 1652)  BP: 124/86 (02/22/17 1652)  SpO2: 96 % (02/22/17 1652) Vital Signs (24h Range):  Temp:  [97.4 °F (36.3 °C)-98.8 °F (37.1  °C)] 97.4 °F (36.3 °C)  Pulse:  [76-86] 83  Resp:  [18-20] 20  SpO2:  [91 %-97 %] 96 %  BP: (109-136)/(54-90) 124/86     Weight: (!) 163.3 kg (360 lb)  Body mass index is 61.79 kg/(m^2).    Intake/Output Summary (Last 24 hours) at 02/22/17 1739  Last data filed at 02/22/17 0933   Gross per 24 hour   Intake           3472.5 ml   Output              400 ml   Net           3072.5 ml      Physical Exam   Constitutional: She is oriented to person, place, and time. She appears well-developed. No distress.   Cardiovascular: Normal rate and regular rhythm.    Pulmonary/Chest: Effort normal and breath sounds normal.   Abdominal: Soft. Bowel sounds are normal.   Musculoskeletal: Normal range of motion. She exhibits edema (mild, on right dorsum of hand).   Neurological: She is alert and oriented to person, place, and time.   Numbness on dorsum of right hand   Skin: She is not diaphoretic.   Vitals reviewed.      Significant Labs: All pertinent labs within the past 24 hours have been reviewed.    Significant Imaging: I have reviewed all pertinent imaging results/findings within the past 24 hours.    Assessment/Plan:      * Diabetic ketoacidosis without coma associated with type 2 diabetes mellitus  A mild case of DKA that responded well to insulin given in the ED. Currently adjusting insulin for better BG control. Goal is 140-180 while inpatient. A1c not reliable given chronic anemia that is at baseline, but it is actually higher this time, meeting criteria for insulin as outaptient. Ok to also continue metformin at discharge. Hold HCTZ as it can also cause hyperglycemia      DUSTIN (acute kidney injury)  Likely pre-renal. IVFs      Mild intermittent asthma  No acute issues. Continue home meds      Seizure disorder  Continue home meds      Migraine  Continue home meds      Morbid obesity with BMI of 60.0-69.9, adult  This was addressed as outpatient. Not interested in evaluation by bariatrics. Will need nutrition consult as  outpatient. Would benefit from [place] weight loss program      Anemia of chronic disease  At baseline      Idiopathic intracranial hypertension  Continue home meds      Essential hypertension  Restart losartan. Hold HCTZ for now. BP currently at goal      GERD (gastroesophageal reflux disease)  Home PPI      Numbness of right hand  Present at area where IV access is at. Will discontinue access and re-assess      VTE Risk Mitigation         Ordered     enoxaparin injection 40 mg  Daily     Route:  Subcutaneous        02/21/17 1319     Medium Risk of VTE  Once      02/21/17 1319     Place sequential compression device  Until discontinued      02/21/17 1319     Place DANA hose  Until discontinued      02/21/17 1319        Likely home tomorrow. Will need insulin, glucose meter, lancets, strips and diabetes education. Instructed patient to do own insulin injection while inpatient. Nursing aware    Deepa Hinojosa MD  Department of Hospital Medicine   [facility]

## 2017-02-23 VITALS
DIASTOLIC BLOOD PRESSURE: 81 MMHG | BODY MASS INDEX: 50.02 KG/M2 | SYSTOLIC BLOOD PRESSURE: 132 MMHG | RESPIRATION RATE: 18 BRPM | OXYGEN SATURATION: 97 % | HEART RATE: 90 BPM | WEIGHT: 293 LBS | HEIGHT: 64 IN | TEMPERATURE: 99 F

## 2017-02-23 LAB
ANION GAP SERPL CALC-SCNC: 8 MMOL/L
BUN SERPL-MCNC: 14 MG/DL
CALCIUM SERPL-MCNC: 8.6 MG/DL
CHLORIDE SERPL-SCNC: 107 MMOL/L
CO2 SERPL-SCNC: 26 MMOL/L
CREAT SERPL-MCNC: 1.2 MG/DL
EST. GFR  (AFRICAN AMERICAN): >60 ML/MIN/1.73 M^2
EST. GFR  (NON AFRICAN AMERICAN): 55 ML/MIN/1.73 M^2
GLUCOSE SERPL-MCNC: 284 MG/DL
POCT GLUCOSE: 314 MG/DL (ref 70–110)
POCT GLUCOSE: 372 MG/DL (ref 70–110)
POCT GLUCOSE: 381 MG/DL (ref 70–110)
POCT GLUCOSE: 416 MG/DL (ref 70–110)
POTASSIUM SERPL-SCNC: 4 MMOL/L
SODIUM SERPL-SCNC: 141 MMOL/L

## 2017-02-23 PROCEDURE — 80048 BASIC METABOLIC PNL TOTAL CA: CPT

## 2017-02-23 PROCEDURE — 25000003 PHARM REV CODE 250: Performed by: INTERNAL MEDICINE

## 2017-02-23 PROCEDURE — 36415 COLL VENOUS BLD VENIPUNCTURE: CPT

## 2017-02-23 PROCEDURE — 25000003 PHARM REV CODE 250: Performed by: EMERGENCY MEDICINE

## 2017-02-23 PROCEDURE — 63600175 PHARM REV CODE 636 W HCPCS: Performed by: EMERGENCY MEDICINE

## 2017-02-23 RX ORDER — GABAPENTIN 300 MG/1
600 CAPSULE ORAL NIGHTLY
Qty: 180 CAPSULE | Refills: 11
Start: 2017-02-23 | End: 2018-02-22

## 2017-02-23 RX ORDER — DEXTROSE 4 G
1 TABLET,CHEWABLE ORAL
Qty: 1 EACH | Refills: 0 | Status: SHIPPED | OUTPATIENT
Start: 2017-02-23 | End: 2017-03-06 | Stop reason: SDUPTHER

## 2017-02-23 RX ORDER — LOSARTAN POTASSIUM 25 MG/1
100 TABLET ORAL DAILY
Qty: 120 TABLET | Refills: 0 | Status: SHIPPED | OUTPATIENT
Start: 2017-02-23 | End: 2017-02-23 | Stop reason: HOSPADM

## 2017-02-23 RX ORDER — LOSARTAN POTASSIUM 100 MG/1
100 TABLET ORAL DAILY
Qty: 90 TABLET | Refills: 3 | Status: SHIPPED | OUTPATIENT
Start: 2017-02-23 | End: 2018-03-26 | Stop reason: SDUPTHER

## 2017-02-23 RX ORDER — INSULIN ASPART 100 [IU]/ML
15 INJECTION, SOLUTION INTRAVENOUS; SUBCUTANEOUS
Qty: 13.5 ML | Refills: 11 | Status: SHIPPED | OUTPATIENT
Start: 2017-02-23 | End: 2017-04-05 | Stop reason: ALTCHOICE

## 2017-02-23 RX ADMIN — INSULIN ASPART 12 UNITS: 100 INJECTION, SOLUTION INTRAVENOUS; SUBCUTANEOUS at 09:02

## 2017-02-23 RX ADMIN — INSULIN ASPART 10 UNITS: 100 INJECTION, SOLUTION INTRAVENOUS; SUBCUTANEOUS at 12:02

## 2017-02-23 RX ADMIN — ENOXAPARIN SODIUM 40 MG: 100 INJECTION SUBCUTANEOUS at 12:02

## 2017-02-23 RX ADMIN — ACETAMINOPHEN 650 MG: 325 TABLET ORAL at 01:02

## 2017-02-23 RX ADMIN — TOPIRAMATE 200 MG: 100 TABLET, FILM COATED ORAL at 09:02

## 2017-02-23 RX ADMIN — ATORVASTATIN CALCIUM 20 MG: 10 TABLET, FILM COATED ORAL at 09:02

## 2017-02-23 RX ADMIN — PANTOPRAZOLE SODIUM 40 MG: 40 TABLET, DELAYED RELEASE ORAL at 09:02

## 2017-02-23 RX ADMIN — LAMOTRIGINE 25 MG: 25 TABLET ORAL at 09:02

## 2017-02-23 RX ADMIN — ACETAZOLAMIDE 500 MG: 500 CAPSULE, EXTENDED RELEASE ORAL at 09:02

## 2017-02-23 RX ADMIN — INSULIN ASPART 12 UNITS: 100 INJECTION, SOLUTION INTRAVENOUS; SUBCUTANEOUS at 12:02

## 2017-02-23 RX ADMIN — INSULIN ASPART 8 UNITS: 100 INJECTION, SOLUTION INTRAVENOUS; SUBCUTANEOUS at 09:02

## 2017-02-23 NOTE — NURSING
In preparation for discharge, d/c'ed patient's tele monitor, NSR prior to removal, d/c'ed patient's saline lock, applied pressure to site, secured site with tape and gauze. Discharge instructions given to patient and spouse at bedside. Patient verbalized understanding of instructions. Patient states willingness to comply.

## 2017-02-23 NOTE — PROGRESS NOTES
Notified Dr. Rubin of blood sugar of 466 and explained that there are two orders for HS Aspart insulin. Ordered to give pt 18 Units. Orders imitated immediately.

## 2017-02-23 NOTE — NURSING
Patient escorted by RN to family vehicle for discharge home. Patient accompanied by spouse. No apparent distress noted.

## 2017-02-23 NOTE — PLAN OF CARE
Problem: Diabetes, Type 2 (Adult)  Intervention: Support/Optimize Psychosocial Response to Condition    02/22/17 0451 02/22/17 2055   Coping/Psychosocial Interventions   Supportive Measures active listening utilized;counseling provided;positive reinforcement provided;relaxation techniques promoted;self-reflection promoted;self-care encouraged;self-responsibility promoted;verbalization of feelings encouraged --    Environmental Support --  calm environment promoted;environmental consistency promoted;personal routine supported       Intervention: Optimize Glycemic Control    02/22/17 2055   Nutrition Interventions   Glycemic Management blood glucose monitoring;supplemental insulin given         Goal: Signs and Symptoms of Listed Potential Problems Will be Absent, Minimized or Managed (Diabetes, Type 2)  Signs and symptoms of listed potential problems will be absent, minimized or managed by discharge/transition of care (reference Diabetes, Type 2 (Adult) CPG).   Outcome: Ongoing (interventions implemented as appropriate)    02/22/17 2055   Diabetes, Type 2   Problems Assessed (Type 2 Diabetes) all   Problems Present (Type 2 Diabetes) hyperglycemia

## 2017-02-23 NOTE — PROGRESS NOTES
Upon entering room to give morning meds pt needed a sternal rub to awake. AAO X 4. States that she is experiencing numbness and tingling to left leg and now both hands are numb. +2 all pulses, weak hand . Pt not able to perform flexion or plantar flexion. Stuttering still noted in speech. Notified Dr. Wilson. Ordered STAT MRI. Orders initiated.

## 2017-02-23 NOTE — NURSING
Call received from [facility], [place] ([phone]). Asked to verify order for Insulin Asparte wether flexpen or vial. Spoke with MD Hinojosa, confirmed order is for flexpen. [place] notified.

## 2017-02-23 NOTE — PROGRESS NOTES
PCT reported that pt says that she feels like she is about to have seizer. Upon entering room pt non responsive to pain, non-verbal, pupils reactive, all VS within normal limits, no change on telemetry, blood sugar 376. Sat pt up to 90 degree position, oxygen applied via NC @ 2 lpm. When pt was able to respond to questions, AAO X 4, slurred speech and stuttering noted, which is new. Hand  weaker than earlier, with right weaker than left. VS taken again after episode, BP elevated at 169/113, all other vitals WNL. Notified Dr. Rubin of events. Ordered CT of head without contrast. Orders initiated immediately.  called to come to hospital. Accompanied pt downstairs with charge nurse, Neal Trejo RN. Holding pt's hand for comfort and twitching noted intermediately. Pt states that she feels like her right hand is jumping and it still feels numb.  waiting in room when came back to floor from radiology. Asked if pt normally stutters when upset. Mr. Kaiser states that his wife only stutters after having a seizure. Pt's VS taken, bed alarm active, yellow non-skid socks in place, call bell at side.  remains at bedside, atentative to pt.

## 2017-02-23 NOTE — PLAN OF CARE
02/23/17 1614   Final Note   Assessment Type Final Discharge Note   Discharge Disposition Home   Hospital Follow Up  Appt(s) scheduled? Yes   Offered [facility] -- Bedside Delivery? n/a   Discharge/Hospital Encounter Summary to ([facility]) PCP n/a   Referral to Outpatient Case Management complete? Yes  (Patient will need referral to outpatient case management)   Right Care Referral Info   Post Acute Recommendation No Care     SW unable to meet with patient to provide prices for prescriptions due to patient ready to leave hospital and not willing to wait.

## 2017-02-23 NOTE — PROGRESS NOTES
PHOEBE contacted [facility] @ [phone] to obtain prices for prescriptions, PHOEBE spoke to Halina Simons pens $3.70  Novolog pens $3.70  Glucometer no charge  Test Strips $15.00  Lancets $4.00  Losartan no charge    Nurse Ruthann came to PHOEBE and stated patient no longer wanted to wait and was ready to discharge out of the hospital, Ruthann stated patient called and scheduled all of her own doctor appointments.  informed nurse Beavers she can let patient go because insulin was confirmed with [facility] pharmacy and total cost will be $26.44.

## 2017-02-23 NOTE — PLAN OF CARE
Problem: Fall Risk (Adult)  Intervention: Reduce Risk/Promote Restraint Free Environment    17 115   Safety Interventions   Environmental Safety Modification assistive device/personal items within reach;clutter free environment maintained;lighting adjusted   Prevent  Drop/Fall   Safety/Security Measures bed alarm set;family to remain at bedside       Intervention: Review Medications/Identify Contributors to Fall Risk    17 115   Safety Interventions   Medication Review/Management medications reviewed       Intervention: Patient Rounds    17 1100   Safety Interventions   Patient Rounds bed in low position;bed wheels locked;call light in reach;clutter free environment maintained;ID band on;placement of personal items at bedside;toileting offered;visualized patient       Intervention: Safety Promotion/Fall Prevention    17 1100   Safety Interventions   Safety Promotion/Fall Prevention nonskid shoes/slippers when out of bed;side rails raised x 2         Goal: Identify Related Risk Factors and Signs and Symptoms  Related risk factors and signs and symptoms are identified upon initiation of Human Response Clinical Practice Guideline (CPG)   Outcome: Ongoing (interventions implemented as appropriate)    17   Fall Risk   Related Risk Factors (Fall Risk) age-related changes;fatigue/slow reaction;gait/mobility problems;environment unfamiliar       Goal: Absence of Falls  Patient will demonstrate the desired outcomes by discharge/transition of care.   Outcome: Ongoing (interventions implemented as appropriate)    17   Fall Risk (Adult)   Absence of Falls making progress toward outcome

## 2017-02-23 NOTE — NURSING
Report received from Ambika ENLSON LPN. Patient resting in bed, appears asleep. Patient awakened for assessment, patient lethargic, responds to touch, patient complains of headache, tingling in Left leg and right hand. Notified MD schwarz that stat MRI cannot be completed due to patients weight and of patients condition. MD to assess patient. Patient and spouse instructed to stay in bed to prevent falls while lethargic. Vitals stable, no acute distress noted. Will continue to monitor.

## 2017-02-24 ENCOUNTER — OFFICE VISIT (OUTPATIENT)
Dept: FAMILY MEDICINE | Facility: CLINIC | Age: 47
End: 2017-02-24
Payer: MEDICARE

## 2017-02-24 ENCOUNTER — LAB VISIT (OUTPATIENT)
Dept: LAB | Facility: HOSPITAL | Age: 47
End: 2017-02-24
Attending: FAMILY MEDICINE
Payer: MEDICARE

## 2017-02-24 ENCOUNTER — OUTPATIENT CASE MANAGEMENT (OUTPATIENT)
Dept: ADMINISTRATIVE | Facility: OTHER | Age: 47
End: 2017-02-24

## 2017-02-24 VITALS
OXYGEN SATURATION: 96 % | HEIGHT: 64 IN | TEMPERATURE: 99 F | DIASTOLIC BLOOD PRESSURE: 76 MMHG | RESPIRATION RATE: 17 BRPM | BODY MASS INDEX: 50.02 KG/M2 | WEIGHT: 293 LBS | SYSTOLIC BLOOD PRESSURE: 140 MMHG | HEART RATE: 86 BPM

## 2017-02-24 DIAGNOSIS — Z91.199 MEDICAL NON-COMPLIANCE: Primary | ICD-10-CM

## 2017-02-24 DIAGNOSIS — E11.21 TYPE 2 DIABETES WITH NEPHROPATHY: Primary | ICD-10-CM

## 2017-02-24 DIAGNOSIS — E11.10 DIABETIC KETOACIDOSIS WITHOUT COMA ASSOCIATED WITH TYPE 2 DIABETES MELLITUS: Primary | ICD-10-CM

## 2017-02-24 DIAGNOSIS — E11.21 TYPE 2 DIABETES WITH NEPHROPATHY: ICD-10-CM

## 2017-02-24 LAB
ALBUMIN SERPL BCP-MCNC: 3.2 G/DL
ALP SERPL-CCNC: 94 U/L
ALT SERPL W/O P-5'-P-CCNC: 22 U/L
ANION GAP SERPL CALC-SCNC: 5 MMOL/L
AST SERPL-CCNC: 14 U/L
BILIRUB SERPL-MCNC: 0.3 MG/DL
BUN SERPL-MCNC: 12 MG/DL
CALCIUM SERPL-MCNC: 8.9 MG/DL
CHLORIDE SERPL-SCNC: 109 MMOL/L
CO2 SERPL-SCNC: 25 MMOL/L
CREAT SERPL-MCNC: 1.2 MG/DL
EST. GFR  (AFRICAN AMERICAN): >60 ML/MIN/1.73 M^2
EST. GFR  (NON AFRICAN AMERICAN): 55 ML/MIN/1.73 M^2
GLUCOSE SERPL-MCNC: 263 MG/DL
POTASSIUM SERPL-SCNC: 3.6 MMOL/L
PROT SERPL-MCNC: 7 G/DL
SODIUM SERPL-SCNC: 139 MMOL/L

## 2017-02-24 PROCEDURE — 99214 OFFICE O/P EST MOD 30 MIN: CPT | Mod: S$GLB,,, | Performed by: FAMILY MEDICINE

## 2017-02-24 PROCEDURE — 99499 UNLISTED E&M SERVICE: CPT | Mod: S$PBB,,, | Performed by: FAMILY MEDICINE

## 2017-02-24 PROCEDURE — 86341 ISLET CELL ANTIBODY: CPT

## 2017-02-24 PROCEDURE — 4010F ACE/ARB THERAPY RXD/TAKEN: CPT | Mod: S$GLB,,, | Performed by: FAMILY MEDICINE

## 2017-02-24 PROCEDURE — 3046F HEMOGLOBIN A1C LEVEL >9.0%: CPT | Mod: S$GLB,,, | Performed by: FAMILY MEDICINE

## 2017-02-24 PROCEDURE — 36415 COLL VENOUS BLD VENIPUNCTURE: CPT

## 2017-02-24 PROCEDURE — 1160F RVW MEDS BY RX/DR IN RCRD: CPT | Mod: S$GLB,,, | Performed by: FAMILY MEDICINE

## 2017-02-24 PROCEDURE — 99999 PR PBB SHADOW E&M-EST. PATIENT-LVL III: CPT | Mod: PBBFAC,,, | Performed by: FAMILY MEDICINE

## 2017-02-24 PROCEDURE — 3077F SYST BP >= 140 MM HG: CPT | Mod: S$GLB,,, | Performed by: FAMILY MEDICINE

## 2017-02-24 PROCEDURE — 86341 ISLET CELL ANTIBODY: CPT | Mod: 91

## 2017-02-24 PROCEDURE — 3078F DIAST BP <80 MM HG: CPT | Mod: S$GLB,,, | Performed by: FAMILY MEDICINE

## 2017-02-24 PROCEDURE — 80053 COMPREHEN METABOLIC PANEL: CPT

## 2017-02-24 PROCEDURE — 84681 ASSAY OF C-PEPTIDE: CPT

## 2017-02-24 NOTE — PROGRESS NOTES
Please note the following patient has been assigned to Makenzie Delgado RN, CCM, with Outpatient Complex Care Mgmt for screening.    Please contact [facility] at ext [phone] with questions.    Thank you    Ansley Conde, SSC

## 2017-02-24 NOTE — DISCHARGE SUMMARY
[facility] Hospital Medicine  Discharge Summary      Patient Name: Yanni Kaiser  MRN: [id number]  Admission Date: 2/21/2017  Hospital Length of Stay: 2 days  Discharge Date and Time:  02/23/2017 2:53 PM  Attending Physician: Ness att. providers found   Discharging Provider: Deepa Hinojosa MD  Primary Care Provider: Carlyle Gordillo MD      HPI:   46 year old female who is morbidly obese and has diabetes mellitus type 2 (reported controlled), idiopathic intracranial HTN, seizure disorder, essential HTN and mild asthma who presented with nausea and abdominal discomfort. This was associated with one week of polyuria and polydipsia. Was found to be severely hyperglycemic to 800s and with a mild case of DKA that responded to insulin aspart given in the ED. Also with mild elevation of Cr from baseline. Is on HCTZ and losartan for BP control, and on metformin for DM2. Last A1c appeared to show controlled diabetes, however patient has chronic anemia. Denied any other symptoms.    * No surgery found *      Indwelling Lines/Drains at time of discharge:   Lines/Drains/Airways          No matching active lines, drains, or airways        Hospital Course:   Ms Kaiser presented with a mild case of DKA and significant hyperglycemia. Is on metformin only but has shown not to be compliant with much of her medical therapy. Is morbidly obese and is not on a specific diet. HgbA1c 10.3%. Gap and metabolic acidosis both quickly responded to 24 units of aspart given in the ED. Hyperglycemia remained an issue, therefore insulin continued to be adjusted. Participated on self injections while inpatient. Unfortunately we do not have inpatient diabetes education, therefore referred as outpatient. Understands how to do self injection and to monitor BG. Her  is diabetic himself and is familiar with insulin use. Discharged on 45 U of long acting and 15 U of short acting, flex pen. Glucose meter, lancets and strips sent  to pharmacy. Is to follow up with PCP at date listed below.    Of note, Ms Kaiser became very lethargic night prior to discharge. Per  Ms Kaiser does not take narcotics and gabapentin is QHS, not TID as it was given here. A head CT was done stat without acute abnormalities seen. This resolved on its own. Narcotics discontinued and gabapentin dose adjusted. This was no longer an issue.      Consults:         Pending Diagnostic Studies:     None        Final Active Diagnoses:    Diagnosis Date Noted POA    PRINCIPAL PROBLEM:  Diabetic ketoacidosis without coma associated with type 2 diabetes mellitus [E13.10] 02/21/2017 Yes    DUSTIN (acute kidney injury) [N17.9] 02/21/2017 Yes    Numbness of right hand [R20.0] 02/22/2017 Yes    GERD (gastroesophageal reflux disease) [K21.9] 07/30/2016 Yes     Chronic    Idiopathic intracranial hypertension [G93.2] 02/04/2016 Yes     Chronic    Essential hypertension [I10] 02/04/2016 Yes     Chronic    Anemia of chronic disease [D63.8] 06/10/2015 Yes     Chronic    Seizure disorder [G40.909] 09/03/2014 Yes     Chronic    Migraine [G43.909] 09/03/2014 Yes     Chronic    Morbid obesity with BMI of 60.0-69.9, adult [E66.01, Z68.44] 09/03/2014 Not Applicable     Chronic    Mild intermittent asthma [J45.20] 03/27/2014 Yes      Problems Resolved During this Admission:    Diagnosis Date Noted Date Resolved POA      No new Assessment & Plan notes have been filed under this hospital service since the last note was generated.  Service: Hospital Medicine      Disposition: Home or Self Care    Follow Up:    Patient Instructions:     Ambulatory consult to Diabetic Education   Referral Priority: Routine Referral Type: Consultation   Referral Reason: Specialty Services Required    Requested Specialty: Diabetes    Number of Visits Requested: 1        Medications:  Reconciled Home Medications:   Discharge Medication List as of 2/23/2017  2:35 PM      START taking these medications     Details   blood sugar diagnostic Strp 1 each by Misc.(Non-Drug; Combo Route) route 4 (four) times daily before meals and nightly., Starting 2/23/2017, Until Discontinued, Normal      blood-glucose meter Misc 1 each by Misc.(Non-Drug; Combo Route) route 4 (four) times daily before meals and nightly., Starting 2/23/2017, Until Fri 2/23/18, Normal      insulin aspart (NOVOLOG) 100 unit/mL injection Inject 15 Units into the skin 3 (three) times daily before meals., Starting 2/23/2017, Until Fri 2/23/18, Normal      insulin detemir (LEVEMIR FLEXTOUCH) 100 unit/mL (3 mL) SubQ InPn pen Inject 45 Units into the skin every evening., Starting 2/23/2017, Until Fri 2/23/18, Normal      lancets 25 gauge Misc 1 lancet by Misc.(Non-Drug; Combo Route) route 4 (four) times daily before meals and nightly., Starting 2/23/2017, Until Discontinued, Normal         CONTINUE these medications which have CHANGED    Details   gabapentin (NEURONTIN) 300 MG capsule Take 2 capsules (600 mg total) by mouth every evening., Starting 2/23/2017, Until Fri 2/23/18, No Print      losartan (COZAAR) 100 MG tablet Take 1 tablet (100 mg total) by mouth once daily., Starting 2/23/2017, Until Fri 2/23/18, Normal         CONTINUE these medications which have NOT CHANGED    Details   acetaZOLAMIDE (DIAMOX) 500 mg CpSR Take 500 mg by mouth 2 (two) times daily., Until Discontinued, Historical Med      atorvastatin (LIPITOR) 20 MG tablet Take 1 tablet (20 mg total) by mouth once daily., Starting 1/11/2017, Until Thu 1/11/18, Normal      butalbital-acetaminophen-caffeine -40 mg (FIORICET, ESGIC) -40 mg per tablet TK 1 T PO Q 8 H PRF PAIN OR HEADACHES, Historical Med      fluticasone (FLONASE) 50 mcg/actuation nasal spray 1 spray by Each Nare route daily as needed., Starting 2/27/2016, Until Discontinued, Normal      fluticasone-vilanterol (BREO ELLIPTA) 200-25 mcg/dose DsDv diskus inhaler Inhale 1 puff into the lungs once daily., Starting 1/17/2017,  Until Discontinued, Normal      ipratropium (ATROVENT) 0.02 % nebulizer solution Take 2.5 mLs (500 mcg total) by nebulization 4 (four) times daily., Starting 1/17/2017, Until Wed 1/17/18, Normal      lamotrigine (LAMICTAL) 25 MG tablet Take 1 tablet (25 mg total) by mouth 2 (two) times daily., Starting 12/20/2016, Until Wed 12/20/17, Normal      levalbuterol (XOPENEX) 1.25 mg/0.5 mL nebulizer solution Take 0.5 mLs (1.25 mg total) by nebulization every 4 (four) hours as needed for Wheezing., Starting 1/17/2017, Until Wed 1/17/18, Normal      LINZESS 290 mcg Cap TK ONE CAPSULE PO D ON AN EMPTY STOMACH, Historical Med      metformin (GLUCOPHAGE-XR) 500 MG 24 hr tablet TAKE 1 TABLET BY MOUTH DAILY WITH BREAKFAST, Normal      montelukast (SINGULAIR) 10 mg tablet Take 1 tablet (10 mg total) by mouth once daily., Starting 1/18/2017, Until Discontinued, Normal      pantoprazole (PROTONIX) 40 MG tablet once daily. , Starting 6/23/2015, Until Discontinued, Historical Med      primidone (MYSOLINE) 50 MG Tab Take 1 tablet (50 mg total) by mouth every evening., Starting 12/20/2016, Until Discontinued, Normal      TAZTIA  mg CpSR TK ONE CAPSULE PO D, Historical Med      topiramate (TOPAMAX) 200 MG Tab Take 1 tablet (200 mg total) by mouth 2 (two) times daily., Starting 12/20/2016, Until Wed 12/20/17, Normal      capsaicin 0.1 % Crea Apply 1 application topically 2 (two) times daily., Starting 11/10/2016, Until Discontinued, Normal      EPIPEN 2-RHODA 0.3 mg/0.3 mL (1:1,000) AtIn Starting 12/3/2015, Until Discontinued, Historical Med      norethindrone (AYGESTIN) 5 mg Tab Take 1 tablet (5 mg total) by mouth once daily. TAKE D 1-10 Q MONTH, Starting 8/23/2016, Until Thu 9/22/16, Normal      nystatin-triamcinolone (MYCOLOG) ointment Apply topically 2 (two) times daily., Starting 10/17/2016, Until Wed 11/16/16, Normal         STOP taking these medications       losartan-hydrochlorothiazide 100-25 mg (HYZAAR) 100-25 mg per tablet  Comments:   Reason for Stopping:         oxycodone (ROXICODONE) 15 MG Tab Comments:   Reason for Stopping:             Time spent on the discharge of patient: > 35 minutes    Deepa Hinojosa MD  Department of Hospital Medicine  [facility]

## 2017-02-27 ENCOUNTER — PATIENT OUTREACH (OUTPATIENT)
Dept: ADMINISTRATIVE | Facility: CLINIC | Age: 47
End: 2017-02-27
Payer: MEDICARE

## 2017-02-27 RX ORDER — VENLAFAXINE HYDROCHLORIDE 75 MG/1
75 CAPSULE, EXTENDED RELEASE ORAL DAILY
COMMUNITY
End: 2017-12-19 | Stop reason: SDUPTHER

## 2017-02-27 NOTE — Clinical Note
Patient reporting she has no needles for new insulin pens; Please reach out to patient. She can be reached at .  Thanking you in advance,  Respectfully,  Annie Rodríguez RN [facility]

## 2017-02-27 NOTE — Clinical Note
Please forward this important TCC information to your provider in order to maximize the post discharge care delivery of this patient.  C3 nurse attempted to contact Yanni Schwartz Job  for a TCC post hospital discharge follow up call. No answer. C3 nurse left a voice message and OOC # given. The patient does not have a HOSFU appointment with a Provider within 7-14 days post hospital discharge date 2/23. Please contact patient and schedule follow up appointment using HOSFU visit type on or before 3/9.  Respectfully, Annie Rodríguez RN  [facility] C3 [email or web address]

## 2017-02-27 NOTE — PROGRESS NOTES
C3 nurse attempted to contact patient. No answer. The following message was left for the patient to return the call:  Good MORNING I am a nurse calling on behalf of [facility] from the [facility].  This is a Transitional Care Call for Yanni Kaiesr . When you have a moment please contact us at [phone] or [phone] Monday through Friday, between the hours of 8 am to 4 pm. We look forward to speaking with you. On behalf of [facility] have a nice day.    The patient does not have a scheduled HOSFU appointment within 7-14 days post hospital discharge date 2/23. Message sent to Physician staff to assist with HOSFU appointment scheduling.

## 2017-02-27 NOTE — PATIENT INSTRUCTIONS
Discharge Instructions for Diabetic Foot Ulcers  You have been diagnosed with foot ulcers related to diabetes, a disease that increases your chances of developing foot problems. Any minor problem can become infected. Infections can become life-threatening if they are not treated. Infections in the bones can also travel throughout your foot and up your leg.  Your doctor wants you to practice good diabetic foot care. Along with your doctors care, this can ensure that hot spots, small cracks, or sores are treated before they get infected. If infection is already present, medications may be prescribed. Follow the tips on this sheet to take better care of your feet.  After Surgery  If you have had surgery, change your dressings every 12 hours to prevent infection. Regular wound care helps keep your foot free of infection and aids healing.  Wash your hands.  Put on disposable gloves if your foot is infected.  Gently remove the old dressing and discard it in a plastic bag.  Take off the gloves.  Wash your hands again.  Put on new gloves.  Clean and dress the wound as directed by your doctor or nurse.  Discard any used materials or trash in a plastic bag before placing in a trash can.  Dispose of sharp objects in specially designated sharps containers.  Daily Foot Check   With a mirror, look at the bottom of your feet every day. This way, you can catch small skin changes before they turn into bigger problems, such as ulcers, or before they become infected.  Call your doctor right away if you notice any of the following: hot spots, red streaks, swelling, cracks, sores, injuries, or foreign objects embedded in your foot.  Before putting on shoes, check the soles and insides for ayana or splinters. Remove these as they could break the skin or put added pressure on your feet.  Foot Care  Wash your feet every day; use lukewarm (not hot) water and mild soap. Make sure to wash between your toes.  Use a soft towel to dry your  feet well, especially between the toes. Pat gently; don't rub.  Apply a cream or lanolin lotion, especially on the heels, to keep the skin smooth. If it is cracked, talk to your doctor about how to treat it.  Dust your feet with nonmedicated powder before putting on shoes, socks, or stockings. This will help keep them dry.  Talk to your doctor before treating calluses, corns, or bunions.  To prevent ingrown toenails, cut toenails straight across. Try soaking your toenails in warm water to soften them before cutting.  Try to keep your feet from getting too hot or too cold.  Don't go barefoot.  Avoid rough surfaces or surfaces with sharp objects.  Don't wear shoes that are too tight or uncomfortable.  Don't test the temperature of the bathtub water with your feet if you have diminished sensation.  Follow your doctors instructions about walking and other activity. There may be some restrictions depending on the condition of your feet.  Take all medications exactly as directed.  Follow-Up  Make a follow-up appointment as directed by our staff. Your doctor may refer you to a special wound-care center for treatment.    When to Call Your Doctor  Call your doctor immediately if you have any of the following:  Fever above 101.0°F  Redness, swelling, or pain in the foot that doesn't go away  Numbness or tingling in any part of your foot  Chills, light-headedness, or fainting  Odor from wounds or swollen areas   © 3295-5859 [employer]. [address] [place] [address], [place] [address]. All rights reserved. This information is not intended as a substitute for professional medical care. Always follow your healthcare professional's instructions.

## 2017-03-01 DIAGNOSIS — G89.29 CHRONIC LOW BACK PAIN WITHOUT SCIATICA, UNSPECIFIED BACK PAIN LATERALITY: Primary | ICD-10-CM

## 2017-03-01 DIAGNOSIS — B37.31 VAGINAL CANDIDIASIS: ICD-10-CM

## 2017-03-01 DIAGNOSIS — M54.50 CHRONIC LOW BACK PAIN WITHOUT SCIATICA, UNSPECIFIED BACK PAIN LATERALITY: Primary | ICD-10-CM

## 2017-03-01 LAB — GAD65 AB SER-SCNC: 0 NMOL/L

## 2017-03-01 RX ORDER — OXYCODONE AND ACETAMINOPHEN 5; 325 MG/1; MG/1
1 TABLET ORAL EVERY 8 HOURS PRN
Qty: 90 TABLET | Refills: 0 | Status: SHIPPED | OUTPATIENT
Start: 2017-03-01 | End: 2017-04-05

## 2017-03-01 RX ORDER — FLUCONAZOLE 150 MG/1
150 TABLET ORAL ONCE
Qty: 1 TABLET | Refills: 0 | Status: SHIPPED | OUTPATIENT
Start: 2017-03-01 | End: 2017-03-01

## 2017-03-01 NOTE — PROGRESS NOTES
Routine Office Visit    Patient Name: Yanni Kaiser    : 1970  MRN: [id number]    Subjective:  Yanni is a 46 y.o. female who presents today for:    1. Follow up  Patient recently admitted to the hospital for first time with DKA.  Patient states that she has never had her blood sugar get over 300 and doesn't know why it did this time.  She was under the impression that something may have been missed when she had her physical.  Since her physical patient has had trouble with asthma exacerbations requiring steroid therapy.  She states that her asthma is now well controlled.  She has no other complaints at this time.  She is currently taking insulin which she has not had to take before.  Her blood sugars are still not coming below 200mg/dl.  She denies any polydipsia or polyuria.  No chest pain or shortness of breath.     Past Medical History  Past Medical History:   Diagnosis Date    Allergy     Asthma     Diabetes mellitus     Diabetes mellitus, type 2     GERD (gastroesophageal reflux disease)     High cholesterol     Hypertension     Seizures     Sleep apnea        Past Surgical History  Past Surgical History:   Procedure Laterality Date    CHOLECYSTECTOMY      GALLBLADDER SURGERY      SINUS SURGERY         Family History  Family History   Problem Relation Age of Onset    Cancer Mother     Hypertension Mother     Diabetes Mother     Stroke Father     Heart disease Father     COPD Father     Heart attack Father     Cancer Maternal Grandmother        Social History  Social History     Social History    Marital status:      Spouse name: N/A    Number of children: N/A    Years of education: N/A     Occupational History    Not on file.     Social History Main Topics    Smoking status: Current Every Day Smoker     Packs/day: 1.00     Years: 15.00     Types: Cigarettes     Last attempt to quit: 6/10/2015    Smokeless tobacco: Never Used    Alcohol use No    Drug use: No     Sexual activity: Yes     Partners: Male     Birth control/ protection: None     Other Topics Concern    Not on file     Social History Narrative       Current Medications  Current Outpatient Prescriptions on File Prior to Visit   Medication Sig Dispense Refill    acetaZOLAMIDE (DIAMOX) 500 mg CpSR Take 500 mg by mouth 2 (two) times daily.      atorvastatin (LIPITOR) 20 MG tablet Take 1 tablet (20 mg total) by mouth once daily. 90 tablet 1    blood sugar diagnostic Strp 1 each by Misc.(Non-Drug; Combo Route) route 4 (four) times daily before meals and nightly. 200 each 11    blood-glucose meter Misc 1 each by Misc.(Non-Drug; Combo Route) route 4 (four) times daily before meals and nightly. 1 each 0    butalbital-acetaminophen-caffeine -40 mg (FIORICET, ESGIC) -40 mg per tablet TK 1 T PO Q 8 H PRF PAIN OR HEADACHES  2    capsaicin 0.1 % Crea Apply 1 application topically 2 (two) times daily. 56.6 g 1    EPIPEN 2-RHODA 0.3 mg/0.3 mL (1:1,000) AtIn   1    fluticasone (FLONASE) 50 mcg/actuation nasal spray 1 spray by Each Nare route daily as needed. 16 g 1    fluticasone-vilanterol (BREO ELLIPTA) 200-25 mcg/dose DsDv diskus inhaler Inhale 1 puff into the lungs once daily. 1 each 3    gabapentin (NEURONTIN) 300 MG capsule Take 2 capsules (600 mg total) by mouth every evening. 180 capsule 11    insulin aspart (NOVOLOG) 100 unit/mL injection Inject 15 Units into the skin 3 (three) times daily before meals. 13.5 mL 11    insulin detemir (LEVEMIR FLEXTOUCH) 100 unit/mL (3 mL) SubQ InPn pen Inject 45 Units into the skin every evening. 13.5 mL 11    ipratropium (ATROVENT) 0.02 % nebulizer solution Take 2.5 mLs (500 mcg total) by nebulization 4 (four) times daily. 100 vial 0    lamotrigine (LAMICTAL) 25 MG tablet Take 1 tablet (25 mg total) by mouth 2 (two) times daily. 60 tablet 11    lancets 25 gauge Misc 1 lancet by Misc.(Non-Drug; Combo Route) route 4 (four) times daily before meals and nightly. 200  "each 11    levalbuterol (XOPENEX) 1.25 mg/0.5 mL nebulizer solution Take 0.5 mLs (1.25 mg total) by nebulization every 4 (four) hours as needed for Wheezing. 100 each 2    LINZESS 290 mcg Cap TK ONE CAPSULE PO D ON AN EMPTY STOMACH  5    losartan (COZAAR) 100 MG tablet Take 1 tablet (100 mg total) by mouth once daily. 90 tablet 3    metformin (GLUCOPHAGE-XR) 500 MG 24 hr tablet TAKE 1 TABLET BY MOUTH DAILY WITH BREAKFAST 90 tablet 0    montelukast (SINGULAIR) 10 mg tablet Take 1 tablet (10 mg total) by mouth once daily. 30 tablet 5    norethindrone (AYGESTIN) 5 mg Tab Take 1 tablet (5 mg total) by mouth once daily. TAKE D 1-10 Q MONTH 10 tablet 12    pantoprazole (PROTONIX) 40 MG tablet once daily.   0    primidone (MYSOLINE) 50 MG Tab Take 1 tablet (50 mg total) by mouth every evening. 90 tablet 3    TAZTIA  mg CpSR TK ONE CAPSULE PO D  2    topiramate (TOPAMAX) 200 MG Tab Take 1 tablet (200 mg total) by mouth 2 (two) times daily. 60 tablet 11    nystatin-triamcinolone (MYCOLOG) ointment Apply topically 2 (two) times daily. 60 g 2     No current facility-administered medications on file prior to visit.        Allergies   Review of patient's allergies indicates:  No Known Allergies    Review of Systems (Pertinent positives)  Review of Systems   Constitutional: Negative.    HENT: Negative.    Eyes: Negative.    Respiratory: Negative.    Cardiovascular: Negative.    Gastrointestinal: Negative.    Genitourinary: Negative.    Musculoskeletal: Negative.    Skin: Negative.          BP (!) 140/76 (BP Location: Left arm, Patient Position: Sitting, BP Method: Manual)  Pulse 86  Temp 98.7 °F (37.1 °C) (Oral)   Resp 17  Ht 5' 4" (1.626 m)  Wt (!) 163 kg (359 lb 5.6 oz)  LMP 10/24/2016  SpO2 96%  BMI 61.68 kg/m2    GENERAL APPEARANCE: in no apparent distress and well developed and well nourished  HEENT: PERRLA, EOMI, Sclera clear, anicteric, Oropharynx clear, no lesions, Neck supple with midline " trachea  NECK: normal, supple, no adenopathy, thyroid normal in size  RESPIRATORY: appears well, vitals normal, no respiratory distress, acyanotic, normal RR, chest clear, no wheezing, crepitations, rhonchi, normal symmetric air entry  HEART: regular rate and rhythm, S1, S2 normal, no murmur, click, rub or gallop.    ABDOMEN: abdomen is soft without tenderness, no masses, no hernias, no organomegaly, no rebound, no guarding. Suprapubic tenderness absent. No CVA tenderness.  SKIN: no rashes, no wounds, no other lesions  PSYCH: Alert, oriented x 3, thought content appropriate, speech normal, pleasant and cooperative, good eye contact, well groomed    Assessment/Plan:  Yanni Kaiser is a 46 y.o. female who presents today for :    Yanni was seen today for hospital follow up.    Diagnoses and all orders for this visit:    Type 2 diabetes with nephropathy  -     Comprehensive metabolic panel; Future  -     C-PEPTIDE; Future  -     GLUTAMIC ACID DECARBOXYLASE; Future  -     ANTI-ISLET CELL ANTIBODY; Future  -     Ambulatory referral to Endocrinology      1.  Labs done today  2.  Referral placed to endocrine NP  3.  She is to continue insulin as prescribed  4.  Will adjust as needed  5.  Will likely need to see diabetic educator for education of diet    Carlyle Gordillo MD

## 2017-03-02 ENCOUNTER — TELEPHONE (OUTPATIENT)
Dept: FAMILY MEDICINE | Facility: CLINIC | Age: 47
End: 2017-03-02

## 2017-03-02 LAB — C PEPTIDE SERPL-MCNC: 1.9 NG/ML

## 2017-03-02 NOTE — TELEPHONE ENCOUNTER
Attempt made to contact patient voicemail unavailable on contact number in message and no answer on mobile.

## 2017-03-02 NOTE — TELEPHONE ENCOUNTER
----- Message from Annie Rodríguez RN sent at 2/27/2017  4:23 PM CST -----  Patient reporting she has no needles for new insulin pens; Please reach out to patient. She can be reached at .    Thanking you in advance,    Respectfully,   Annie Rodríguez RN  [facility]

## 2017-03-03 LAB — PANC ISLET CELL IGG SER-ACNC: <5 JDF UNITS

## 2017-03-07 RX ORDER — LANCETS
1 EACH MISCELLANEOUS 4 TIMES DAILY
Qty: 200 EACH | Refills: 3 | Status: SHIPPED | OUTPATIENT
Start: 2017-03-07 | End: 2017-03-21 | Stop reason: SDUPTHER

## 2017-03-07 RX ORDER — DEXTROSE 4 G
1 TABLET,CHEWABLE ORAL
Qty: 1 EACH | Refills: 0 | Status: SHIPPED | OUTPATIENT
Start: 2017-03-07 | End: 2017-05-16 | Stop reason: SDUPTHER

## 2017-03-08 ENCOUNTER — TELEPHONE (OUTPATIENT)
Dept: FAMILY MEDICINE | Facility: CLINIC | Age: 47
End: 2017-03-08

## 2017-03-08 ENCOUNTER — CLINICAL SUPPORT (OUTPATIENT)
Dept: REHABILITATION | Facility: HOSPITAL | Age: 47
End: 2017-03-08
Attending: PODIATRIST
Payer: MEDICARE

## 2017-03-08 ENCOUNTER — TELEPHONE (OUTPATIENT)
Dept: DIABETES | Facility: CLINIC | Age: 47
End: 2017-03-08

## 2017-03-08 DIAGNOSIS — M62.81 MUSCLE WEAKNESS: ICD-10-CM

## 2017-03-08 DIAGNOSIS — M72.2 PLANTAR FASCIITIS, BILATERAL: Primary | ICD-10-CM

## 2017-03-08 DIAGNOSIS — M62.89 MUSCLE TIGHTNESS: ICD-10-CM

## 2017-03-08 DIAGNOSIS — N18.30 TYPE 2 DIABETES MELLITUS WITH STAGE 3 CHRONIC KIDNEY DISEASE, WITHOUT LONG-TERM CURRENT USE OF INSULIN: Primary | ICD-10-CM

## 2017-03-08 DIAGNOSIS — E11.22 TYPE 2 DIABETES MELLITUS WITH STAGE 3 CHRONIC KIDNEY DISEASE, WITHOUT LONG-TERM CURRENT USE OF INSULIN: Primary | ICD-10-CM

## 2017-03-08 PROBLEM — E11.10 DIABETIC KETOACIDOSIS WITHOUT COMA ASSOCIATED WITH TYPE 2 DIABETES MELLITUS: Status: RESOLVED | Noted: 2017-02-21 | Resolved: 2017-03-08

## 2017-03-08 PROCEDURE — 97110 THERAPEUTIC EXERCISES: CPT | Mod: PN

## 2017-03-08 PROCEDURE — G8979 MOBILITY GOAL STATUS: HCPCS | Mod: CK,PN

## 2017-03-08 PROCEDURE — 97161 PT EVAL LOW COMPLEX 20 MIN: CPT | Mod: PN

## 2017-03-08 PROCEDURE — G8978 MOBILITY CURRENT STATUS: HCPCS | Mod: CL,PN

## 2017-03-08 NOTE — PLAN OF CARE
"  TIME RECORD    Date: 03/08/2017    Start Time:  1120  Stop Time:  1200      OUTPATIENT PHYSICAL THERAPY   PATIENT EVALUATION  Onset Date: about 1 year prior  Primary Diagnosis:   1. Muscle tightness       Treatment Diagnosis: decreased ROM, weakness of foot, poor balance, muscle balance  Past Medical History:   Diagnosis Date    Allergy     Asthma     Diabetes mellitus     Diabetes mellitus, type 2     GERD (gastroesophageal reflux disease)     High cholesterol     Hypertension     Seizures     Sleep apnea      Precautions: none  Prior Therapy: for feet and lower back   Medications: Yanni Kaiser has a current medication list which includes the following prescription(s): acetazolamide, atorvastatin, blood sugar diagnostic, blood-glucose meter, butalbital-acetaminophen-caffeine -40 mg, capsaicin, epipen 2-ronan, fluticasone, fluticasone-vilanterol, gabapentin, insulin aspart, insulin detemir, ipratropium, lamotrigine, lancets, lancets, levalbuterol, linzess, losartan, metformin, montelukast, norethindrone, nystatin-triamcinolone, oxycodone-acetaminophen, pantoprazole, primidone, taztia xt, topiramate, and venlafaxine.  Nutrition:  Obese  History of Present Illness: Pt with 1 year history of bilateral plantar foot pain. Pt wears diabetic shoes with custom mold of the sole, has been wearing since 2015. Pt denies treatment for foot pain prior. Pt with injection into the R foot with good relief.   Prior Level of Function: Independent  Social History: on disability - playing with grandchildren   Place of Residence (Steps/Adaptations): lives with daughter and  and grandson - 1 story home - 0 steps  Functional Deficits Leading to Referral/Nature of Injury: difficulty with sit to stand transitions, walking for long periods of time, standing for long periods of time  Patient Therapy Goals: " get my feet strong than what they are"    Subjective     Yanni Kaiser states her pain has " gradually been increasing, however, pain is intermittent. Pt reporting she thinks her LLE is weaker than the RLE. Pt reports pain will wake her up at night, she must take pain medication or rub her feet to get back to sleep. Pt denies drop foot, loss of B/B control, unexplained weight gain/loss, fever, chills, or night.     Pain:  Location: plantar surface of bilateral feet   Description: Aching, Tingling and Sharp  Activities Which Increase Pain: Standing, Walking and Getting out of bed/chair  Activities Which Decrease Pain: tylenol, rest, ice  Pain Scale: 4/10 at best 4/10 now  10/10 at worst    Objective     Observation/posture: bilateral navicular drop, endomorph body type  Ankle    Right     Left    Pain/Dysfunction with Movement      AROM  PROM  MMT  AROM  PROM  MMT     Plantarflexion  45  4+ 3 15 4-    Dorsiflexion  -10 WNL 4 45  4    Inversion  35 40 4 27 35* 4    Eversion  0 5* 4- 12 15 4-      toe ROM and strength: ROM WNL   R toe extension: 3+/5; flexion: 4-/5   L toe extension: 3+/5; flexion: 4-/5  Sensation/Reflexes: grossly intact to light touch throughout BLE  Palpation:   L foot: tenderness to palpation along medial aspect of foot, peroneus group, extensor digitorum longus   R foot: plantar surface of calcaneous, medial aspect of foot  Tests:                  SLS: x5 seconds RLE, x2 second LLE (knee buckling)  Gait: mild antalgic gait favoring R LE  knee screen:  R L   Flex 4 3+   Ext 4 4-  FOTO: 63% limitation  Current G-Code:  CL 60-80%  Goal G-Code:  CK 40-60%    today's treatment x25 minutes:   education: educated in evaluation findings and POC.  Educated in expectations of treatment, provider's contact information (email and phone) given to pt for communication of any questions and concerns.   HEP instruction and ther ex: instructed and performed following:    Seated ankle ABCs x2 rounds    Seated towel scrunch 2x10    Seated great to extension stretch 2x20 seconds    Standing gastroc  stretch 2x30 seconds leaning against table (runner stretch)        Assessment       Initial Assessment (Pertinent finding, problem list and factors affecting outcome): Ms. Shruthi Kaiser is a 47 y/o female referred to outpatient PT for bilateral foot pain. Pt presenting with mild limitations in bilateral ankle ROM with provocation of pain, weakness of bilateral ankle and toes and LLE (comapred to RLE), poor balance, muscle tightness in BLE, and bilateral navicular drop. Pt signs and symptoms consistent with referring diagnosis. Pt would benefit from skilled PT to address above stated problems, as well as, achieve pt goals within a timely manner. Pt has set realistic goals and has verbalized good understanding and agreement with reported diagnosis, prognosis and treatment. Pt demonstrates no additional cultural, spiritual or educational need and currently has no barriers to learning.      History  Co-morbidities and personal factors that may impact the plan of care Examination  Body Structures and Functions, activity limitations and participation restrictions that may impact the plan of care Clinical Presentation   Decision Making/ Complexity Score   Co-morbidities:      obesity, HTN, seizure disorder            Personal Factors:   Assist in taking care of her grandson Body Regions: bilateral LE and bilateral feet  weakness, gait instability, impaired balance, pain, decreased ROM, impaired joint extensibility and impaired muscle length  Body Systems:   MSK      Activity limitations:   Unable to walk >30 minutes, stand >30 minute, squat, lift    Participation Restrictions: (W/D/S)  diffiulcty running errands for family  Unable to work secondary to pain       stable  low             Rehab Potiential: fair    Short Term Goals (4 Weeks):   1. PT will report 20% reduction in bilateral foot pain for ease with playing with grandson  2. Pt will demonstrate 1/3 MMT improvement in bilateral ankle and foot strength for ease with  assisting with ADL's  3. Pt will demonstrate SLS on each foot x10 seconds for ease with climbing stairs  Long Term Goals (8 Weeks):   1. Pt will report 50% reduction in bilateral foot pain for ease with ADL's  2. PT will demonstrate full ROM and strength in bilateral feet/ankles for ease with taking grandson to the park  3. Pt will report being independent with HEP for maintenance of improvements gained during therapy sessions  4. Pt will demonstrate SLS on ea LE x15 seconds for ease with playing with grandson  5. Pt will score <60% limitation on the FOTO for ease with return to participation within the community    Plan     Certification Period: 3/8/17 to 6/8/17  Recommended Treatment Plan: 2 times per week for 8 weeks: Gait Training, Manual Therapy, Moist Heat/ Ice, Neuromuscular Re-ed, Patient Education, Therapeutic Exercise and Ultrasound/Phonophoresis  Other Recommendations: none      Therapist: Lesli Montelongo, PT    I CERTIFY THE NEED FOR THESE SERVICES FURNISHED UNDER THIS PLAN OF TREATMENT AND WHILE UNDER MY CARE    Physician's comments: ________________________________________________________________________________________________________________________________________________      Physician's Name: ___________________________________

## 2017-03-08 NOTE — TELEPHONE ENCOUNTER
----- Message from Carlyle Gordillo MD sent at 3/8/2017  2:57 PM CST -----  Please let patient know she can take the fluid pill she has for the next week, but I will want to get blood work on her to make sure kidney function is still ok.    Thanks,  Dr. Gordillo

## 2017-03-08 NOTE — PROGRESS NOTES
"  TIME RECORD    Date: 03/08/2017    Start Time:  1120  Stop Time:  1200      OUTPATIENT PHYSICAL THERAPY   PATIENT EVALUATION  Onset Date: about 1 year prior  Primary Diagnosis:   1. Muscle tightness       Treatment Diagnosis: decreased ROM, weakness of foot, poor balance, muscle balance  Past Medical History:   Diagnosis Date    Allergy     Asthma     Diabetes mellitus     Diabetes mellitus, type 2     GERD (gastroesophageal reflux disease)     High cholesterol     Hypertension     Seizures     Sleep apnea      Precautions: none  Prior Therapy: for feet and lower back   Medications: Yanni Kaiser has a current medication list which includes the following prescription(s): acetazolamide, atorvastatin, blood sugar diagnostic, blood-glucose meter, butalbital-acetaminophen-caffeine -40 mg, capsaicin, epipen 2-ronan, fluticasone, fluticasone-vilanterol, gabapentin, insulin aspart, insulin detemir, ipratropium, lamotrigine, lancets, lancets, levalbuterol, linzess, losartan, metformin, montelukast, norethindrone, nystatin-triamcinolone, oxycodone-acetaminophen, pantoprazole, primidone, taztia xt, topiramate, and venlafaxine.  Nutrition:  Obese  History of Present Illness: Pt with 1 year history of bilateral plantar foot pain. Pt wears diabetic shoes with custom mold of the sole, has been wearing since 2015. Pt denies treatment for foot pain prior. Pt with injection into the R foot with good relief.   Prior Level of Function: Independent  Social History: on disability - playing with grandchildren   Place of Residence (Steps/Adaptations): lives with daughter and  and grandson - 1 story home - 0 steps  Functional Deficits Leading to Referral/Nature of Injury: difficulty with sit to stand transitions, walking for long periods of time, standing for long periods of time  Patient Therapy Goals: " get my feet strong than what they are"    Subjective     Yanni Kaiser states her pain has " gradually been increasing, however, pain is intermittent. Pt reporting she thinks her LLE is weaker than the RLE. Pt reports pain will wake her up at night, she must take pain medication or rub her feet to get back to sleep. Pt denies drop foot, loss of B/B control, unexplained weight gain/loss, fever, chills, or night.     Pain:  Location: plantar surface of bilateral feet   Description: Aching, Tingling and Sharp  Activities Which Increase Pain: Standing, Walking and Getting out of bed/chair  Activities Which Decrease Pain: tylenol, rest, ice  Pain Scale: 4/10 at best 4/10 now  10/10 at worst    Objective     Observation/posture: bilateral navicular drop, endomorph body type  Ankle    Right     Left    Pain/Dysfunction with Movement      AROM  PROM  MMT  AROM  PROM  MMT     Plantarflexion  45  4+ 3 15 4-    Dorsiflexion  -10 WNL 4 45  4    Inversion  35 40 4 27 35* 4    Eversion  0 5* 4- 12 15 4-      toe ROM and strength: ROM WNL   R toe extension: 3+/5; flexion: 4-/5   L toe extension: 3+/5; flexion: 4-/5  Sensation/Reflexes: grossly intact to light touch throughout BLE  Palpation:   L foot: tenderness to palpation along medial aspect of foot, peroneus group, extensor digitorum longus   R foot: plantar surface of calcaneous, medial aspect of foot  Tests:                  SLS: x5 seconds RLE, x2 second LLE (knee buckling)  Gait: mild antalgic gait favoring R LE  knee screen:  R L   Flex 4 3+   Ext 4 4-  FOTO: 63% limitation  Current G-Code:  CL 60-80%  Goal G-Code:  CK 40-60%    today's treatment x25 minutes:   education: educated in evaluation findings and POC.  Educated in expectations of treatment, provider's contact information (email and phone) given to pt for communication of any questions and concerns.   HEP instruction and ther ex: instructed and performed following:    Seated ankle ABCs x2 rounds    Seated towel scrunch 2x10    Seated great to extension stretch 2x20 seconds    Standing gastroc  stretch 2x30 seconds leaning against table (runner stretch)        Assessment       Initial Assessment (Pertinent finding, problem list and factors affecting outcome): Ms. Shruthi Kaiser is a 45 y/o female referred to outpatient PT for bilateral foot pain. Pt presenting with mild limitations in bilateral ankle ROM with provocation of pain, weakness of bilateral ankle and toes and LLE (comapred to RLE), poor balance, muscle tightness in BLE, and bilateral navicular drop. Pt signs and symptoms consistent with referring diagnosis. Pt would benefit from skilled PT to address above stated problems, as well as, achieve pt goals within a timely manner. Pt has set realistic goals and has verbalized good understanding and agreement with reported diagnosis, prognosis and treatment. Pt demonstrates no additional cultural, spiritual or educational need and currently has no barriers to learning.      History  Co-morbidities and personal factors that may impact the plan of care Examination  Body Structures and Functions, activity limitations and participation restrictions that may impact the plan of care Clinical Presentation   Decision Making/ Complexity Score   Co-morbidities:      obesity, HTN, seizure disorder            Personal Factors:   Assist in taking care of her grandson Body Regions: bilateral LE and bilateral feet  weakness, gait instability, impaired balance, pain, decreased ROM, impaired joint extensibility and impaired muscle length  Body Systems:   MSK      Activity limitations:   Unable to walk >30 minutes, stand >30 minute, squat, lift    Participation Restrictions: (W/D/S)  diffiulcty running errands for family  Unable to work secondary to pain       stable  low             Rehab Potiential: fair    Short Term Goals (4 Weeks):   1. PT will report 20% reduction in bilateral foot pain for ease with playing with grandson  2. Pt will demonstrate 1/3 MMT improvement in bilateral ankle and foot strength for ease with  assisting with ADL's  3. Pt will demonstrate SLS on each foot x10 seconds for ease with climbing stairs  Long Term Goals (8 Weeks):   1. Pt will report 50% reduction in bilateral foot pain for ease with ADL's  2. PT will demonstrate full ROM and strength in bilateral feet/ankles for ease with taking grandson to the park  3. Pt will report being independent with HEP for maintenance of improvements gained during therapy sessions  4. Pt will demonstrate SLS on ea LE x15 seconds for ease with playing with grandson  5. Pt will score <60% limitation on the FOTO for ease with return to participation within the community    Plan     Certification Period: 3/8/17 to 6/8/17  Recommended Treatment Plan: 2 times per week for 8 weeks: Gait Training, Manual Therapy, Moist Heat/ Ice, Neuromuscular Re-ed, Patient Education, Therapeutic Exercise and Ultrasound/Phonophoresis  Other Recommendations: none      Therapist: Lesli Montelongo, PT    I CERTIFY THE NEED FOR THESE SERVICES FURNISHED UNDER THIS PLAN OF TREATMENT AND WHILE UNDER MY CARE    Physician's comments: ________________________________________________________________________________________________________________________________________________      Physician's Name: ___________________________________

## 2017-03-09 ENCOUNTER — OUTPATIENT CASE MANAGEMENT (OUTPATIENT)
Dept: ADMINISTRATIVE | Facility: OTHER | Age: 47
End: 2017-03-09

## 2017-03-09 ENCOUNTER — OFFICE VISIT (OUTPATIENT)
Dept: DIABETES | Facility: CLINIC | Age: 47
End: 2017-03-09
Payer: MEDICARE

## 2017-03-09 VITALS
DIASTOLIC BLOOD PRESSURE: 64 MMHG | SYSTOLIC BLOOD PRESSURE: 106 MMHG | WEIGHT: 293 LBS | BODY MASS INDEX: 50.02 KG/M2 | HEIGHT: 64 IN | HEART RATE: 66 BPM

## 2017-03-09 DIAGNOSIS — I10 ESSENTIAL HYPERTENSION: ICD-10-CM

## 2017-03-09 DIAGNOSIS — E11.65 UNCONTROLLED TYPE 2 DIABETES MELLITUS WITH COMPLICATION, UNSPECIFIED LONG TERM INSULIN USE STATUS: Primary | ICD-10-CM

## 2017-03-09 DIAGNOSIS — G47.30 SLEEP APNEA, UNSPECIFIED TYPE: ICD-10-CM

## 2017-03-09 DIAGNOSIS — Z72.0 TOBACCO ABUSE: ICD-10-CM

## 2017-03-09 DIAGNOSIS — G62.9 NEUROPATHY: ICD-10-CM

## 2017-03-09 DIAGNOSIS — E11.8 UNCONTROLLED TYPE 2 DIABETES MELLITUS WITH COMPLICATION, UNSPECIFIED LONG TERM INSULIN USE STATUS: Primary | ICD-10-CM

## 2017-03-09 DIAGNOSIS — E66.01 MORBID OBESITY, UNSPECIFIED OBESITY TYPE: ICD-10-CM

## 2017-03-09 DIAGNOSIS — H53.9 VISUAL DISTURBANCE: ICD-10-CM

## 2017-03-09 PROCEDURE — 3078F DIAST BP <80 MM HG: CPT | Mod: S$GLB,,, | Performed by: NURSE PRACTITIONER

## 2017-03-09 PROCEDURE — 99204 OFFICE O/P NEW MOD 45 MIN: CPT | Mod: S$GLB,,, | Performed by: NURSE PRACTITIONER

## 2017-03-09 PROCEDURE — 3074F SYST BP LT 130 MM HG: CPT | Mod: S$GLB,,, | Performed by: NURSE PRACTITIONER

## 2017-03-09 PROCEDURE — 1160F RVW MEDS BY RX/DR IN RCRD: CPT | Mod: S$GLB,,, | Performed by: NURSE PRACTITIONER

## 2017-03-09 PROCEDURE — 4010F ACE/ARB THERAPY RXD/TAKEN: CPT | Mod: S$GLB,,, | Performed by: NURSE PRACTITIONER

## 2017-03-09 PROCEDURE — 99999 PR PBB SHADOW E&M-EST. PATIENT-LVL III: CPT | Mod: PBBFAC,,, | Performed by: NURSE PRACTITIONER

## 2017-03-09 PROCEDURE — 99499 UNLISTED E&M SERVICE: CPT | Mod: S$GLB,,, | Performed by: NURSE PRACTITIONER

## 2017-03-09 PROCEDURE — 3046F HEMOGLOBIN A1C LEVEL >9.0%: CPT | Mod: S$GLB,,, | Performed by: NURSE PRACTITIONER

## 2017-03-09 RX ORDER — METFORMIN HYDROCHLORIDE 500 MG/1
1000 TABLET, EXTENDED RELEASE ORAL 2 TIMES DAILY WITH MEALS
Qty: 360 TABLET | Refills: 0 | Status: SHIPPED | OUTPATIENT
Start: 2017-03-09 | End: 2019-01-11 | Stop reason: SDUPTHER

## 2017-03-09 NOTE — PATIENT INSTRUCTIONS
1. Starting with dinner tonight, resume metformin  mg.   2. Reduce Novolog to 10 units before meals starting with dinner with tonight.   3. Reduce Levemir to 35 units every night starting tonight.   4. Continue to test glucoses before meals and bedtime.   5. Send a glucose log in 1 week.  6. Call with any issues, especially if your glucoses are less than 70 for no known reason.

## 2017-03-09 NOTE — LETTER
March 9, 2017    Yanni Kaiser  [address] [facility] [address] [place] [id number]             [facility] [address] [place] [address] Dear Mrs. Kaiser:    I have enclosed the education materials we discussed during our recent phone conversation.       If you have any questions or concerns, please don't hesitate to call.    Sincerely,        Makenzie Delgado RN

## 2017-03-09 NOTE — PROGRESS NOTES
"CC: This 46 y.o. Black or  female  is here for evaluation of  T2DM along with comorbidities indicated in the Visit Diagnosis section of this encounter.    HPI: Yanni Kaiser was diagnosed with T2DM by her eye doctor ~2010. Metformin started at the time of diagnosis.     New to Endocrine. Referred by Dr. Gordillo. Two weeks ago, she was admitted to [facility] on 2/21/17 for 2 days for DKA w/ BG of 800 upon admit. She was discharged on insulin and has been on it since. Had not been on any other meds for diabetes other than metformin 500 mg/day until now. However, she stopped taking metformin at the same time that she started insulin.   Prior to DKA episode she had received steroids at an ED visit on 1/16/17 for an asthma attack. Then she got a steroid shot for her foot pain on 2/20/17.   Smokes 0.5 ppd.   Followed by Dr. Horner, Neurology, for idiopathic intracranial HTN and chronic HA.      PMXH: asthma, pseduotumor cerebri,  seizures without recent episodes, sleep apnea not on CPAP d/t discomfort. Anemia.     LAST DIABETES EDUCATION: never     HOSPITALIZED FOR DIABETES -  Yes - in 2017 for the first time, as above.    PRESCRIBED DIABETES MEDICATIONS: Levemir Flextouch 45 units every night at 10 pm, Novolog Flexpen 15 units ac    Misses medication doses - No    DM COMPLICATIONS: peripheral neuropathy    SIGNIFICANT DIABETES MED HISTORY: denies    SELF MONITORING BLOOD GLUCOSE: tests bg 4x/day.             HYPOGLYCEMIC EPISODES: denies     CURRENT DIET: 3 meals/day. Drinks sprite zeros and water.     CURRENT EXERCISE: none      /64  Pulse 66  Ht 5' 4" (1.626 m)  Wt (!) 162.8 kg (358 lb 14.5 oz)  LMP 10/24/2016  BMI 61.61 kg/m2      ROS:   CONSTITUTIONAL: Appetite good, denies fatigue  SKIN: No rash or pruritis   EYES: + visual disturbances osman with recent rise in glucoses   RESPIRATORY: No shortness of breath or cough  CARDIAC: No chest pain or palpitations  GI: + occ nausea; " no vomiting, or diarrhea  : + urinary frequency on Diamox; no dysuria   MS: + foot pain   NEURO: + paresthesias to feet; no tremors.   PSYCH: No depression  OTHER: n/a      PHYSICAL EXAM:  GENERAL: Well developed, well nourished. No acute distress.   PSYCH: AAOx3, appropriate mood and affect, conversant, well-groomed. Judgement and insight good.   NEURO: Cranial nerves grossly intact. Speech clear, no tremor.   NECK: Trachea midline, no thyromegaly or lymphadenopathy.   CHEST: Respirations even and unlabored. CTA bilaterally.  CARDIOVASCULAR: Regular rate and rhythm. No bruits. No murmur. Trace pitting pretibial edema.   ABDOMEN: Soft, non-tender, non-distended. Bowel sounds present.   MS: Gait steady. No clubbing.   SKIN: Normal skin turgor. Skin warm and dry. No areas of breakdown. + nuchal acanthosis nigricans.      Hemoglobin A1C   Date Value Ref Range Status   02/21/2017 10.3 (H) 4.5 - 6.2 % Final     Comment:     According to ADA guidelines, hemoglobin A1C <7.0% represents  optimal control in non-pregnant diabetic patients.  Different  metrics may apply to specific populations.   Standards of Medical Care in Diabetes - 2016.  For the purpose of screening for the presence of diabetes:  <5.7%     Consistent with the absence of diabetes  5.7-6.4%  Consistent with increasing risk for diabetes   (prediabetes)  >or=6.5%  Consistent with diabetes  Currently no consensus exists for use of hemoglobin A1C  for diagnosis of diabetes for children.     01/11/2017 6.7 (H) 4.5 - 6.2 % Final     Comment:     According to ADA guidelines, hemoglobin A1C <7.0% represents  optimal control in non-pregnant diabetic patients.  Different  metrics may apply to specific populations.   Standards of Medical Care in Diabetes - 2016.  For the purpose of screening for the presence of diabetes:  <5.7%     Consistent with the absence of diabetes  5.7-6.4%  Consistent with increasing risk for diabetes   (prediabetes)  >or=6.5%  Consistent  with diabetes  Currently no consensus exists for use of hemoglobin A1C  for diagnosis of diabetes for children.     10/17/2016 6.6 (H) 4.5 - 6.2 % Final     Comment:     According to ADA guidelines, hemoglobin A1C <7.0% represents  optimal control in non-pregnant diabetic patients.  Different  metrics may apply to specific populations.   Standards of Medical Care in Diabetes - 2016.  For the purpose of screening for the presence of diabetes:  <5.7%     Consistent with the absence of diabetes  5.7-6.4%  Consistent with increasing risk for diabetes   (prediabetes)  >or=6.5%  Consistent with diabetes  Currently no consensus exists for use of hemoglobin A1C  for diagnosis of diabetes for children.             Chemistry        Component Value Date/Time     02/24/2017 1504    K 3.6 02/24/2017 1504     02/24/2017 1504    CO2 25 02/24/2017 1504    BUN 12 02/24/2017 1504    CREATININE 1.2 02/24/2017 1504     (H) 02/24/2017 1504        Component Value Date/Time    CALCIUM 8.9 02/24/2017 1504    ALKPHOS 94 02/24/2017 1504    AST 14 02/24/2017 1504    ALT 22 02/24/2017 1504    BILITOT 0.3 02/24/2017 1504             Ref. Range 2/22/2017 05:23 2/23/2017 06:55 2/24/2017 15:04   BUN, Bld Latest Ref Range: 6 - 20 mg/dL 20 14 12   Creatinine Latest Ref Range: 0.5 - 1.4 mg/dL 1.3 1.2 1.2   eGFR if non African American Latest Ref Range: >60 mL/min/1.73 m^2 50 (A) 55 (A) 55 (A)   eGFR if African American Latest Ref Range: >60 mL/min/1.73 m^2 58 (A) >60 >60       Lab Results   Component Value Date    LDLCALC 80.0 07/30/2016        Ref. Range 7/30/2016 05:34   Cholesterol Latest Ref Range: 120 - 199 mg/dL 145   HDL Latest Ref Range: 40 - 75 mg/dL 52   LDL Cholesterol Latest Ref Range: 63.0 - 159.0 mg/dL 80.0   Total Cholesterol/HDL Ratio Latest Ref Range: 2.0 - 5.0  2.8   Triglycerides Latest Ref Range: 30 - 150 mg/dL 65     Lab Results   Component Value Date    MICALBCREAT 3.7 10/19/2015           STANDARDS of CARE:         ASA:               ACEI/ARB:         Statin:         Last eye exam:       ASSESSMENT and PLAN:    A1C goal: <7%  Fasting/premeal blood glucose goal:   2 hour post-meal blood glucose goal: less than 180      1. Uncontrolled type 2 diabetes mellitus with complication, unspecified long term insulin use status  Recent DKA episode d/t recent steroid therapy. She continues to be in a highly insulin resistant, glucose-toxic state. Will continue insulin and add metformin back with goal of weaning off of insulin. Advised pt -     1. Starting with dinner tonight, resume metformin  mg.   2. Reduce Novolog to 10 units before meals starting with dinner with tonight.   3. Reduce Levemir to 35 units every night starting tonight.   4. Continue to test glucoses before meals and bedtime.   5. Send a glucose log in 1 week.  6. Call with any issues, especially if your glucoses are less than 70 for no known reason.     rtc in 4 weeks.      2. Neuropathy  Followed by Podiatry  Improve glycemic control.      3. Essential hypertension  controlled   4. Morbid obesity, unspecified obesity type  Increases insulin resistance.      5. Tobacco abuse  Advised her to stop smoking.    6. Sleep apnea, unspecified type  Encouraged her to get refitted for a mask. Reviewed complications of untreated SHARATH.    7. Visual disturbance  Improve glycemic control. Will likely return to baseline with glycemic control.        No orders of the defined types were placed in this encounter.       Return in about 4 weeks (around 4/6/2017).     Thank you very much for allowing me to participate in Yanni Kaiser's care.

## 2017-03-09 NOTE — PATIENT INSTRUCTIONS
Hypoglycemia (Low Blood Sugar)     Fast-acting sugar includes a cup of nonfat milk.     Too little sugar (glucose) in your blood is called hypoglycemia or low blood sugar. Low blood sugar usually means anything lower than 70 mg/dL. Talk with your healthcare provider about your target range and what level is too low for you. Diabetes itself doesnt cause low blood sugar. But some of the treatments for diabetes, such as pills or insulin, may raise your risk for it. Low blood sugar may cause you to pass out or have a seizure. So always treat low blood sugar right away, but don't overeat.  Special note: Always carry a source of fast-acting sugar and a snack in case of hypoglycemia.   What you may notice  If you have low blood sugar, you may have one or more of these symptoms:  · Shakiness or dizziness  · Cold, clammy skin or sweating  · Feelings of hunger  · Headache  · Nervousness  · A hard, fast heartbeat  · Weakness  · Confusion or irritability  · Blurred vision  · Having nightmares or waking up confused or sweating  · Numbness or tingling in the lips or tongue  What you should do  Here are tips to follow if you have hypoglycemia:   · First check your blood sugar. If it is too low (out of your target range), eat or drink 15 to 20 grams of fast-acting sugar. This may be 3 to 4 glucose tablets, 4 ounces (half a cup) of fruit juice or regular (nondiet) soda, 8 ounces (1 cup) of fat-free milk, or 1 tablespoon of honey. Dont take more than this, or your blood sugar may go too high.  · Wait 15 minutes. Then recheck your blood sugar if you can.  · If your blood sugar is still too low, repeat the steps above and check your blood sugar again. If your blood sugar still has not returned to your target range, contact your healthcare provider or seek emergency care.  · Once your blood sugar returns to target range, eat a snack or meal.  Preventing low blood sugar  Things you can do include the following:   · If your condition  needs a strict treatment plan, eat your meals and snacks at the same times each day. Dont skip meals!  · If your treatment plan lets you change when you eat and what you eat, learn how to change the time and dose of your rapid-acting insulin to match this.   · Ask your healthcare provider if it is safe for you to drink alcohol. Never drink on an empty stomach.  · Take your medicine at the prescribed times.  · Always carry a source of fast-acting sugar and a snack when youre away from home.  Other things to do  Additional tips include the following:  · Carry a medical ID card, a compact USB drive, or wear a medical alert bracelet or necklace. It should say that you have diabetes. It should also say what to do if you pass out or have a seizure.  · Make sure your family, friends, and coworkers know the signs of low blood sugar. Tell them what to do if your blood sugar falls very low and you cant treat yourself.  · Keep a glucagon emergency kit handy. Be sure your family, friends, and coworkers know how and when to use it. Check it regularly and replace the glucagon before it expires.  · Talk with your health care team about other things you can do to prevent low blood sugar.     If you have unexplained hypoglycemia or hypoglycemia several times, call your healthcare provider.   Date Last Reviewed: 5/1/2016 © 2000-2016 [employer]. [address] [place] [address], [place] [address]. All rights reserved. This information is not intended as a substitute for professional medical care. Always follow your healthcare professional's instructions.        Diabetes with High Blood Sugar  You have been treated for high blood sugar (hyperglycemia). This may be because of an infection or other illness, eating too many sweets or starches, or not taking enough insulin.  Home care  Monitor and write down your blood sugar level at least twice a day. Do this before breakfast and before dinner. If you take insulin, record your  "routine insulin dose as well. Also record any additional doses required based on your sliding scale. Do this for the next 3 to 5 days.  High blood sugar may cause symptoms that you can learn to recognize, such as:  · Frequent urination  · Thirst  · Dizziness  · Headache  · Shortness of breath  · Breath that smells fruity  · Nausea or vomiting  · Abdominal pain  · Drowsiness or loss of consciousness  If you have high-blood-sugar symptoms, use a blood or urine test to find out what your blood sugar level is. If it is above your usual range, use the "sliding scale" regular insulin dose prescribed by your healthcare provider. If you were not given a range for your insulin dose, contact your healthcare provider for more advice.  Follow-up care  Follow up with your healthcare provider, or as advised. You may need to meet with your healthcare provider in the next week. You will likely review your blood sugar records together. You may also talk to your provider about adjusting your dose of insulin or other medicine for blood sugar.  When to seek medical advice  Call your healthcare provider right away if these occur:  · High blood sugar symptoms (Symptoms are described above.)  · Blood sugar over 300 mg/dl If you cant reach your healthcare provider, go to a hospital emergency room or urgent care center  Date Last Reviewed: 6/1/2016  © 7156-6337 [employer]. [address] [place] [address], [place] [address]. All rights reserved. This information is not intended as a substitute for professional medical care. Always follow your healthcare professional's instructions.        Long-Term Complications of Diabetes    Diabetes can cause health problems over time. These are called complications. They are more likely to happen if your blood sugar is often too high. Over time, high blood sugar can damage blood vessels in your body. It is important to keep your blood sugar in your target range. This can help prevent or delay " complications from diabetes.  Possible complications  Complications of diabetes include:  · Eye problems, including damage to the blood vessels in the eyes (retinopathy), pressure in the eye (glaucoma), and clouding of the eyes lens (a cataract). Eye problems can eventually lead to irreversible blindness.   · Tooth and gum problems (periodontal disease), causing loss of teeth and bone  · Blood vessel (vascular) disease leading to circulation problems, heart attack or stroke, or a need for amputation of a limb   · Problems with sexual function leading to erectile dysfunction in men and sexual discomfort in women   · Kidney disease (nephropathy) can eventually lead to kidney failure, which may require dialysis or kidney transplant   · Nerve problems (neuropathy), causing pain or loss of feeling in your feet and other parts of your body, potentially leading to an amputation of a limb   · High blood pressure (hypertension), putting strain on your heart and blood vessels  · Serious infections, possibly leading to loss of toes, feet, or limbs  How to avoid complications  The serious consequences of these complications may be avoidable for most people with diabetes by managing your blood glucose, blood pressure, and cholesterol levels. This can help you feel better and stay healthy. You can manage diabetes by tracking your blood sugar. You can also eat healthy and exercise to avoid gaining weight. And you should take medicine if directed by your healthcare provider.  Date Last Reviewed: 5/1/2016 © 2000-2016 [email or web address]. [address] [place] [address], [place] [address]. All rights reserved. This information is not intended as a substitute for professional medical care. Always follow your healthcare professional's instructions.        Managing Diabetes: The A1C Test       Healthy red blood cells have some glucose stuck to them. A high A1C means that unhealthy amounts of glucose are stuck to the cells.   What is the  A1C test?  Using your meter helps you track your blood sugar every day. But your glucose meter tells you the value at the time of testing only. You also need to know if your treatment plan is keeping you healthy over time. The hemoglobin A1C (or glycated hemoglobin) test can help. This test measures your average blood sugar level over a few months. A higher A1C result means that you have a higher risk of developing complications.  The A1C test  The A1C is a blood test done by your healthcare provider. You will likely have an A1C test every 3 to 6 months.  Your blood glucose goal  A1C has been shown as a percentage. But it can also be shown as a number representing the estimated Average Glucose (eAG). Unlike the A1C percentage, eAG is a number similar to the numbers listed on your daily glucose monitor. Both A1C and eAG measure the amount of glucose stuck to a protein called hemoglobin in red blood cells. Your healthcare provider will help you figure out what your ideal A1C or eAG should be. Your target number will depend on your age, general health, and other factors. If your current number is too high, your treatment plan may need changes, such as different medicines.  Sample results  Most people aim for an A1c lower than 7%. Thats an eAG less than 154 mg/dL. Or, your healthcare provider may want you to aim for an A1C of 6%. Thats an eAG of 126 mg/dL.     Glucose calculator  Visit http://[email or web address].diabetes.org/diapro/glucose_calc for a chart that helps convert your A1C percentages into eAG numbers.   Date Last Reviewed: 6/1/2016  © 4224-3656 [employer]. [address] [place] [address], [place] [address]. All rights reserved. This information is not intended as a substitute for professional medical care. Always follow your healthcare professional's instructions.        Diabetes: Keeping Feet Healthy     Have your feet checked every time you see your healthcare provider, and at least once a year.      Diabetes can damage nerves in your feet and cause neuropathy. This condition makes it hard for you to feel injuries or sore spots. Diabetes can also change blood flow, making it harder for small problems, like a blister, to heal properly. In fact, minor injuries can quickly become serious infections that send you to the hospital. Practice self-care to protect your feet and keep them healthy.   Take special care  Here are tips for taking special care of your feet:  · Inspect your feet daily for problems such as redness, blisters, cracks, dry skin, or numbness. Use a mirror to see the bottoms of your feet. Or, ask for help.  · Manage your diabetes. Monitor and control your blood sugar. Take all your medicines as prescribed.  · Avoid walking barefoot, even indoors. Always wear socks inside your shoes.   · Wash your feet with warm water and mild soap. Dry well, especially between toes.  · Dont treat corns or calluses yourself. Talk to your healthcare provider or podiatrist (a healthcare provider who specializes in foot care) if you need assistance trimming your toenails.  · Use moisturizing cream or lotion if you have dry skin, but dont use it between toes.  · Dont use heating pads on your feet. If you have neuropathy, you could get a burn and not feel it.  · Stop smoking. Smoking restricts blood flow and can make it harder for wounds to heal.  · Don't use sharp blades to trim your nails. Use a nail clipper and file instead.   Have regular checkups  Foot problems can develop quickly. So be sure to follow your healthcare teams schedule for regular checkups. During office visits, take off your shoes and socks as soon as you get in the exam room. Ask your healthcare provider to examine your feet for problems. This will make it easier to find and treat small skin irritations before they get worse. Regular checkups can also help keep track of the blood flow and feeling in your feet. If you have neuropathy, you may  need to have checkups more often.  Wear proper footwear  Wearing proper footwear is very important. If areas of your feet have been damaged by too much pressure, your healthcare provider may recommend changing your footwear. In some cases, avoiding high heels or tight work boots may be all thats needed. Or, your healthcare provider may recommend special shoes or custom inserts. These help protect your feet and keep existing irritations from getting worse. If you need special footwear, ask your healthcare provider if you qualify for Medicare's custom-molded and extra-depth diabetic shoe and insert program.   Make sure shoes and socks fit  Any pair of shoes--new or old--should feel comfortable as soon as you put them on. There shouldnt be any rubbing when you walk. Wear the right shoe for any activity. For instance, a running shoe is designed to keep your feet injury-free while jogging. Buy shoes at the end of the day, when your feet are larger. Make sure they provide support without feeling too loose. Make sure your socks fit, too. Wear soft, seamless, well-padded socks for activity. Cotton or microfiber socks are best to help to absorb sweat. To protect your feet, avoid shoes that are open-toed or open-heeled. If you have questions about what kinds of shoes and socks are best, talk to your healthcare team.  Get regular exercise  Regular exercise improves blood flow in your feet. It also increases foot strength and flexibility. Gentle exercises, like walking or riding a stationary bicycle, are best. You can also do special foot exercises. Just be sure to talk with your healthcare provider before starting any exercise program. Also mention if any exercise causes pain, redness, or other signs of foot problems.     Note: If you have any kind of break in the skin of your foot or ankle, keep the area clean. Then call your healthcare provider--especially if the area doesnt appear to be healing.   Date Last Reviewed:  6/1/2016 © 2000-2016 [employer]. [address] [place] [address], [place] [address]. All rights reserved. This information is not intended as a substitute for professional medical care. Always follow your healthcare professional's instructions.        Diabetes: Sick-Day Plan  Infections, the flu, and even a cold, can cause your blood sugar to rise. And, eating less, nausea, and vomiting may cause your blood glucose to fall (hypoglycemia). Ask your healthcare provider to help you develop a sick-day plan. The following information can help.    Donts  Don'ts include the following:  · Diabetes medicines. Dont stop taking your diabetes medicine.  · Other medicines. Dont take other medicines, such as those for colds or the flu, without checking with your healthcare provider.  Dos  Do's include the following:  · Eating. Stick to your meal plan. If you cant eat, try fruit juice, regular gelatin, or frozen juice bars as directed by your healthcare provider.  · Drinking. Drink at least 1 glass of liquid every hour. If youre eating, these liquids should be sugar-free.  · Blood glucose. Check your blood sugar as often as directed by your healthcare provider. You may need to check it more often than usual.  · Blood or urine ketones. Check your blood or urine for ketones. Ketones are the waste from burning fat instead of glucose for energy. Ketones are a warning sign of ketoacidosis. Ketoacidosis is a medical emergency.  · Diabetes medicines.  ¨ Adjust your insulin according to your sick-day plan. Don't skip insulin. You need insulin even if you can't eat your normal meals.  ¨ If you take pills for diabetes (oral medicines), take your normal dose unless your healthcare provider tells you something different.  · Sugar-free medicines. Look for sugar-free cough drops and other medicines. Ask your healthcare provider if its OK for you to take these.  · Getting help. If you're alone, ask someone to check on you several  times a day.  Try to get all these supplies together before you need them.  Call your healthcare provider  Call your healthcare provider if you have any of the following:  · You vomit or have diarrhea for more than 6 hours.  · Your blood glucose level is higher than usual or over 250 mg/dL after you have taken extra insulin (if recommended in your sick-day plan).  · You take oral medicine for diabetes, and your blood sugar is higher than usual or over 250 mg/dL, before a meal and stays that high for more than 24 hours.  · Your blood glucose is lower than usual or less than 70 mg/dL  · You have moderate to large amounts of ketones in your blood or urine.  · You arent better after 2 days.  Date Last Reviewed: 6/1/2016  © 4260-8693 [employer]. [address] [place] [address], [place] [address]. All rights reserved. This information is not intended as a substitute for professional medical care. Always follow your healthcare professional's instructions.

## 2017-03-09 NOTE — MR AVS SNAPSHOT
[facility]  [address]  [address]  [place] [address]  Phone: [phone]  Fax: [phone]                  Yanni Kaiser   3/9/2017 10:00 AM   Office Visit    Description:  Female : 1970   Provider:  Jessica Dwyer NP   Department:  [facility]                To Do List           Future Appointments        Provider Department Dept Phone    3/10/2017 9:30 AM Claudia Haney PTA [facility] [phone]    3/15/2017 2:00 PM Lesli Montelongo, PT [facility] [phone]    3/17/2017 1:30 PM Claudia Haney PTA [facility] [phone]    3/22/2017 1:30 PM Lesli Montelongo, PT [facility] [phone]    3/24/2017 10:30 AM Pj Harrell MD [facility] [phone]      Goals (5 Years of Data)     None      Follow-Up and Disposition     Return in about 4 weeks (around 2017).       These Medications        Disp Refills Start End    metformin (GLUCOPHAGE-XR) 500 MG 24 hr tablet 360 tablet 0 3/9/2017     Take 2 tablets (1,000 mg total) by mouth 2 (two) times daily with meals. - Oral    Pharmacy: [facility] [address] [place] [address] [place]PY AT [facility] Ph #: [phone]         [facility] On [facility]     [facility] Care [facility] -  Assistance  Registered nurses in the [facility] Center provide clinical advisement, health education, appointment booking, and other advisory services.  Call for this free service at [phone].             Medications           Message regarding Medications     Verify the changes and/or additions to your medication regime listed below are the same as discussed with your clinician today.  If any of these changes or additions are incorrect, please notify your healthcare provider.        CHANGE how you are taking these medications     Start Taking Instead of    metformin  (GLUCOPHAGE-XR) 500 MG 24 hr tablet metformin (GLUCOPHAGE-XR) 500 MG 24 hr tablet    Dosage:  Take 2 tablets (1,000 mg total) by mouth 2 (two) times daily with meals. Dosage:  TAKE 1 TABLET BY MOUTH DAILY WITH BREAKFAST    Reason for Change:  Reorder       STOP taking these medications     norethindrone (AYGESTIN) 5 mg Tab Take 1 tablet (5 mg total) by mouth once daily. TAKE D 1-10 Q MONTH           Verify that the below list of medications is an accurate representation of the medications you are currently taking.  If none reported, the list may be blank. If incorrect, please contact your healthcare provider. Carry this list with you in case of emergency.           Current Medications     acetaZOLAMIDE (DIAMOX) 500 mg CpSR Take 500 mg by mouth 2 (two) times daily.    atorvastatin (LIPITOR) 20 MG tablet Take 1 tablet (20 mg total) by mouth once daily.    blood sugar diagnostic Strp 1 each by Misc.(Non-Drug; Combo Route) route 4 (four) times daily before meals and nightly. ACCU CHEK ELVIA PLUS METER    blood-glucose meter Misc 1 each by Misc.(Non-Drug; Combo Route) route 4 (four) times daily before meals and nightly. ACCU CHEK ELVIA PLUS METER    butalbital-acetaminophen-caffeine -40 mg (FIORICET, ESGIC) -40 mg per tablet TK 1 T PO Q 8 H PRF PAIN OR HEADACHES    capsaicin 0.1 % Crea Apply 1 application topically 2 (two) times daily.    EPIPEN 2-RHODA 0.3 mg/0.3 mL (1:1,000) AtIn     fluticasone (FLONASE) 50 mcg/actuation nasal spray 1 spray by Each Nare route daily as needed.    fluticasone-vilanterol (BREO ELLIPTA) 200-25 mcg/dose DsDv diskus inhaler Inhale 1 puff into the lungs once daily.    gabapentin (NEURONTIN) 300 MG capsule Take 2 capsules (600 mg total) by mouth every evening.    insulin aspart (NOVOLOG) 100 unit/mL injection Inject 15 Units into the skin 3 (three) times daily before meals.    insulin detemir (LEVEMIR FLEXTOUCH) 100 unit/mL (3 mL) SubQ InPn pen Inject 45 Units into the skin every  "evening.    ipratropium (ATROVENT) 0.02 % nebulizer solution Take 2.5 mLs (500 mcg total) by nebulization 4 (four) times daily.    lamotrigine (LAMICTAL) 25 MG tablet Take 1 tablet (25 mg total) by mouth 2 (two) times daily.    lancets (ACCU-CHEK SOFTCLIX LANCETS) Misc 1 Device by Misc.(Non-Drug; Combo Route) route 4 (four) times daily.    lancets 25 gauge Misc 1 lancet by Misc.(Non-Drug; Combo Route) route 4 (four) times daily before meals and nightly.    levalbuterol (XOPENEX) 1.25 mg/0.5 mL nebulizer solution Take 0.5 mLs (1.25 mg total) by nebulization every 4 (four) hours as needed for Wheezing.    LINZESS 290 mcg Cap TK ONE CAPSULE PO D ON AN EMPTY STOMACH    losartan (COZAAR) 100 MG tablet Take 1 tablet (100 mg total) by mouth once daily.    metformin (GLUCOPHAGE-XR) 500 MG 24 hr tablet Take 2 tablets (1,000 mg total) by mouth 2 (two) times daily with meals.    montelukast (SINGULAIR) 10 mg tablet Take 1 tablet (10 mg total) by mouth once daily.    nystatin-triamcinolone (MYCOLOG) ointment Apply topically 2 (two) times daily.    oxycodone-acetaminophen (PERCOCET) 5-325 mg per tablet Take 1 tablet by mouth every 8 (eight) hours as needed for Pain.    pantoprazole (PROTONIX) 40 MG tablet once daily.     primidone (MYSOLINE) 50 MG Tab Take 1 tablet (50 mg total) by mouth every evening.    TAZTIA  mg CpSR TK ONE CAPSULE PO D    topiramate (TOPAMAX) 200 MG Tab Take 1 tablet (200 mg total) by mouth 2 (two) times daily.    venlafaxine (EFFEXOR-XR) 75 MG 24 hr capsule Take 75 mg by mouth once daily.           Clinical Reference Information           Your Vitals Were     BP Pulse Height Weight Last Period BMI    106/64 66 5' 4" (1.626 m) 162.8 kg (358 lb 14.5 oz) 10/24/2016 61.61 kg/m2      Blood Pressure          Most Recent Value    BP  106/64      Allergies as of 3/9/2017     No Known Allergies      Immunizations Administered on Date of Encounter - 3/9/2017     None      [facility] Sign-Up     Activating your " [facility] account is as easy as 1-2-3!     1) Visit [email or web address], select Sign Up Now, enter this activation code and your date of birth, then select Next.  [email or web address]  Expires: 4/9/2017  1:47 PM      2) Create a username and password to use when you visit [facility] in the future and select a security question in case you lose your password and select Next.    3) Enter your e-mail address and click Sign Up!    Additional Information  If you have questions, please e-mail [email or web address] or call [phone] to talk to our [facility] staff. Remember, [facility] is NOT to be used for urgent needs. For medical emergencies, dial 911.         Instructions    1. Starting with dinner tonight, resume metformin  mg.   2. Reduce Novolog to 10 units before meals starting with dinner with tonight.   3. Reduce Levemir to 35 units every night starting tonight.   4. Continue to test glucoses before meals and bedtime.   5. Send a glucose log in 1 week.  6. Call with any issues, especially if your glucoses are less than 70 for no known reason.          Smoking Cessation     If you would like to quit smoking:   You may be eligible for free services if you are a [place] resident and started smoking cigarettes before September 1, 1988.  Call the Smoking Cessation Trust ([facility]) toll free at (434) [phone] or ([phone].   Call 1-800-QUIT-NOW if you do not meet the above criteria.            Language Assistance Services     ATTENTION: Language assistance services are available, free of charge. Please call [phone].      ATENCIÓN: Si habla español, tiene a lee disposición servicios gratuitos de asistencia lingüística. Llame al [phone].     CHÚ Ý: N?u b?n nói Ti?ng Vi?t, có các d?ch v? h? tr? ngôn ng? mi?n phí dành cho b?n. G?i s? [phone].         [facility] complies with applicable Federal civil rights laws and does not discriminate on the basis of race, color, national  origin, age, disability, or sex.

## 2017-03-09 NOTE — PROGRESS NOTES
Assessment completed with pt. Discussed pt's A1C of 10.3, up from 6.7 1 month ago. Pt states she received steroids for Asthma exacerbation and also had a steroid shot for her foot during that time. She states she has not changed her diet. She states she tries to take her medications as directed. She consistently takes her seizure medications. Pt admits to forgetting refills at times. Discussed mail order pharmacy. Pt states she used in the past and she doesn't like it. She would prefer to continue using [facility]. Pt monitors her blood sugar 4x/day. Pt smokes about 1/2 ppd. She states she cut back from 1 ppd. Discussed smoking cessation program. Pt states she took Chantix in the past and had hallucinations. She has the phone number for the program and will consider making an appt. Pt had appt with endocrine this morning. Will mail education materials to pt and follow up in 1 week.

## 2017-03-09 NOTE — LETTER
March 9, 2017      Carlyle Gordillo MD  [address] [facility] [address]           [place] [facility]  [address] [facility]  [address]  [facility] [address]  Phone: [phone]  Fax: [phone]          Patient: Yanni Kaiser   MR Number: [id number]   YOB: 1970   Date of Visit: 3/9/2017       Dear Dr. Carlyle KELLY Page:    Thank you for referring Yanni Kaiser to me for evaluation. Attached you will find relevant portions of my assessment and plan of care.    If you have questions, please do not hesitate to call me. I look forward to following Yanni Kaiser along with you.    Sincerely,    Jessica Dwyer, ARPITA    Enclosure  CC:  No Recipients    If you would like to receive this communication electronically, please contact [email or web address] or [phone] to request more information on [employer] [employer] access.    For providers and/or their staff who would like to refer a patient to [facility], please contact us through our one-stop-shop provider referral line, [facility], at [phone].    If you feel you have received this communication in error or would no longer like to receive these types of communications, please e-mail [email or web address]

## 2017-03-10 ENCOUNTER — CLINICAL SUPPORT (OUTPATIENT)
Dept: REHABILITATION | Facility: HOSPITAL | Age: 47
End: 2017-03-10
Attending: PODIATRIST
Payer: MEDICARE

## 2017-03-10 DIAGNOSIS — M62.89 MUSCLE TIGHTNESS: Primary | ICD-10-CM

## 2017-03-10 DIAGNOSIS — M72.2 PLANTAR FASCIITIS, BILATERAL: ICD-10-CM

## 2017-03-10 DIAGNOSIS — M62.81 MUSCLE WEAKNESS: ICD-10-CM

## 2017-03-10 PROCEDURE — 97110 THERAPEUTIC EXERCISES: CPT | Mod: PN

## 2017-03-10 PROCEDURE — 97140 MANUAL THERAPY 1/> REGIONS: CPT | Mod: 59,PN

## 2017-03-10 NOTE — PROGRESS NOTES
Physical Therapy Daily Note     Name: Yanni Kaiser  Clinic Number: [id number]  Diagnosis:   Encounter Diagnoses   Name Primary?    Muscle tightness Yes    Muscle weakness     Plantar fasciitis, bilateral      Physician: Rosalva Taylor DPM  Precautions: standard  Visit #: 2 of 20  PTA Visit #: 1    Time In: 0923  Time Out: 1020  Total Treatment Time: 53 minutes (1:1 with PTA 40 minutes of treatment session)    Subjective     Pt reports: Patient states her feet actually feel okay this morning. Patient states her left leg is a little sore. Patient reports compliance with HEP so far.  Pain Scale: Yanni rates pain on a scale of 0-10 to be 0 currently in bilateral feet.     Objective     All exercises performed on BLE    Yanni received individual therapeutic exercises to develop strength, endurance, ROM, flexibility and posture for 45 minutes including:  Upright bike x 5 minutes   Standing gastroc stretch 2x30 seconds leaning against table (runner stretch)  4 way ankle (YTB) 2x10 all 4 directions  Seated ankle ABCs x 2 rounds  Seated arch doming 2x10  Seated towel scrunch 2x10  Seated marble  x 1 trial  Seated great to extension stretch 2x20 seconds  Seated heel raises 2x10  Seated tennis ball roll x 2 minutes    Yanni received the following manual therapy techniques: Soft tissue Mobilization and manual stretching were applied to the: bilateral plantar fascia for 10 minutes including:    Written Home Exercises Provided: Reviewed current HEP with patient.   Pt demo good understanding of the education provided. Yanni demonstrated good return demonstration of activities.     Education provided re:Yanni verbalized good understanding of education provided.   No spiritual or educational barriers to learning provided    Assessment     Yanni tolerated treatment well today. Patient presents with increased discomfort with direct palpation to  bilateral plantar fascia (R>L) with positive response to manual therapy techniques noted. Patient able to progress to gentle ankle strengthening exercises today with verbal and tactile cueing needed to correct hip compensation and promote isolated ankle motion. Patient demonstrates initial difficulty with marble pick ups indication decreased toe flexion ROM but improvements noted as exercise progressed.   This is a 46 y.o. female referred to outpatient physical therapy and presents with a medical diagnosis of bilateral foot pain and demonstrates limitations as described in the problem list. Pt prognosis is Good. Pt will continue to benefit from skilled outpatient physical therapy to address the deficits listed in the problem list, provide pt/family education and to maximize pt's level of independence in the home and community environment.     Goals as follows:  Short Term Goals (4 Weeks):   1. PT will report 20% reduction in bilateral foot pain for ease with playing with grandson  2. Pt will demonstrate 1/3 MMT improvement in bilateral ankle and foot strength for ease with assisting with ADL's  3. Pt will demonstrate SLS on each foot x10 seconds for ease with climbing stairs  Long Term Goals (8 Weeks):   1. Pt will report 50% reduction in bilateral foot pain for ease with ADL's  2. PT will demonstrate full ROM and strength in bilateral feet/ankles for ease with taking grandson to the park  3. Pt will report being independent with HEP for maintenance of improvements gained during therapy sessions  4. Pt will demonstrate SLS on ea LE x15 seconds for ease with playing with grandson  5. Pt will score <60% limitation on the FOTO for ease with return to participation within the community     Plan     Continue with established Plan of Care towards PT goals.    Therapist: Claudia Haney, PTA  3/10/2017

## 2017-03-16 ENCOUNTER — OUTPATIENT CASE MANAGEMENT (OUTPATIENT)
Dept: ADMINISTRATIVE | Facility: OTHER | Age: 47
End: 2017-03-16

## 2017-03-16 ENCOUNTER — TELEPHONE (OUTPATIENT)
Dept: ENDOCRINOLOGY | Facility: CLINIC | Age: 47
End: 2017-03-16

## 2017-03-16 NOTE — PROGRESS NOTES
"Follow up with pt. Pt is monitoring blood sugar and keeping a log. She spoke to Jessica Dwyer NP today and new orders given. Pt states she is afraid to increase Metformin to 2000 mg/day. She states she is going to continue 1000 mg bid and keep monitoring. She states, "I can see if I was taking steroids, but I am not". She states Jessica is aware that she is going to stay at the previous Metformin dose. Pt reports her blood sugars are . Pt cont with PT. She states she is working on following diabetic diet. Will continue to follow.   "

## 2017-03-16 NOTE — TELEPHONE ENCOUNTER
bg log received. Spoke to pt and suggested increasing metformin er from 1000 mg/day to 2000mg/day and titrate Novolog and Levemir doses down. However, she would like to get off of insulin more quickly. Advised her instead to increase metformin ER to 1000 mg bid, stop Novolog, and continue Lvemir 35 units hs. She will send bg log on Monday.

## 2017-03-17 ENCOUNTER — OUTPATIENT CASE MANAGEMENT (OUTPATIENT)
Dept: ADMINISTRATIVE | Facility: OTHER | Age: 47
End: 2017-03-17

## 2017-03-17 NOTE — LETTER
March 17, 2017    Yanni Kaiser  [facility] [address] [place] [address]             Outpatient Case Management  [facility] [address] Dear Yanni Kaiser:    We understand that receiving many services from different doctors and healthcare providers is overwhelming. There are appointments to make, transportation to arrange, dietary instructions to understand, and new medications to obtain.    This is where [facility] can help.     You are eligible to receive Outpatient Case Management services when you have healthcare needs that require the coordination of many providers, treatments, and services. You also qualify if you need assistance with a new treatment plan.     There is no charge for this support. You may have been referred to this program from your doctor(s), hospital staff member(s), or insurance company but you always have a choice to participate or not participate. To participate, you must give us your permission to be enrolled.     When you are enrolled in the [facility] program, the  who is assigned to you is    Makenzie Delgado, RN  [phone]    Depending on your needs and wishes, your  may speak with you by phone, visit you at your place of living (for example your home, skilled nursing facility, or rehabilitation facility), or meet you at your doctors office.     Your  will tell you why you have been selected to participate in the program and will complete an assessment of your needs. Then a personalized plan of care will be developed with you and or your caregiver.             Here are examples of the services your [facility]  provides.     Coordinate communication among multiple providers.   Arrange for transportation, doctors visits, durable medical equipment, home care services, and special clinics.    Provide coaching on how to manage your health  condition.    Answer questions about your health condition.   Help you understand your doctors treatment plan.    Provide additional instruction about your health condition, treatments, and medications.    Help you obtain information about your insurance coverage.    Advocate for your individual needs.    Request a Licensed Clinical  (LCSW) to visit you if you need their services. LCSWs help with long term planning (discussing placement options, advanced planning directives), financial planning, and assistance (for example rent, utilities, medication funding).     Your  will coordinate their activities with other outpatient services you are receiving. All services provided by [facility]  are coordinated with and communicated to your primary care physician.    Our goal is to help you manage your health condition(s) safely within your living environment, whether that is your home or a medical facility. We want to help you function at the healthiest and highest level possible.     Sincerely,      Martin Morales MD  Medical Director    Enclosures:    Frequently Asked Questions  Patient Rights and Responsibilities   Reporting a Grievance or Complaint  Consent/Release of Information  Stamped Addressed Envelope                  Frequently Asked Questions about [facility]    What is [facility]?  Outpatient Case management is not Home healthcare services. [facility]  do not provide hands-on care. [facility]  will work with your doctor to arrange for home health services, if needed. Home health services have a limited duration and there are some restrictions as to who can get these services. There is no prescribed limit to the amount of time you receive [facility] services. [facility]  are not agents of your insurance company. However,  [facility]  can help you obtain information from your insurance company.     Who are the [facility] ?  [facility]  are Registered Nurses and Social Workers. It is important to remember that you and your  are a team that works together with your primary care physician to create your individualized plan of care. The ultimate goal of your care plan is to help you implement your doctors treatment plan and to help you function at the highest level of health possible.     What are my rights as a patient?  It is important for you to know and understand your rights and responsibilities while receiving services from the [facility] program. We have enclosed a complete description of your rights and responsibilities. You can help to make your care more effective when you understand your right and responsibilities.     What is needed to be enrolled in the program?  You are only enrolled in the [facility] Program when you give us your consent to participate. You will find enclosed a consent form. You are receiving this letter because you or your caregivers have given us a verbal consent to enroll you in [facility] Management Program. We ask that you sign and return the enclosed written consent in the stamped self-addressed envelope.                           Patient Rights and Responsibilities    We consider you a partner in your care. When you are well informed, participate in treatment decisions and communicate openly with your doctor and other healthcare professionals, you help make your care more effective.     While you are in the Outpatient Case Management Program, your rights include the following:     You have a right to be provided services in a non-discriminatory manner in accordance with the provisions of Title VI of the Civil Rights Act of 1964, Section 504 of the Rehabilitation  Act of 1973, the Age Discrimination Act of 1975, the Americans with Disabilities Act as well as any other applicable Federal and State laws and regulations.     You have the right to a reasonable, timely response to your request or need for care, as well as the right to considerate and respectful care including an environment that preserves dignity and contributes to a positive self-image. You are responsible for being considerate and respectful of our staff.     You have a right to information regarding patient rights, advocacy services and complaint mechanisms, and the right to prompt resolution of any complaint. You or a designee has the right to participate in the resolution of ethical issues surrounding your care. You have a right to file a complaint if you feel that your rights have been infringed, without fear or penalty from [facility] or the federal government. You may file a complaint with the Director of Outpatient Case Management by calling [phone]. At any time, you may lodge a grievance with the [facility] and [facility] by calling [phone], or the Joint Commission on Accreditation of Healthcare Organizations at [phone].     You, or someone acting on your behalf, have the right to understandable information on your health status, treatment and progress in order to make decisions. You have the right to know the nature, risks and alternatives to treatment. You have the right to be informed, when appropriate, regarding the outcome of the care that has been provided. You have the right to refuse treatment to the extent permitted by law, and the right to be informed of the alternatives and consequences of refusing treatment.     You, in collaboration with your physician, have the right to make decisions regarding care and the right to participate in the development and implementation of the plan of care and effective pain management. You have the right to know the name and  professional status of those responsible for the delivery of your care and treatment.       You have a right, within legal guidelines, to have a guardian, next-of-kin or legal designee exercises your patient rights when you are unable to do so. You have the right for your wishes regarding end-of-life decisions to be addressed by the healthcare team through advance directives. You have the right to personal privacy and confidentiality and to expect confidentiality of all records and communications pertaining to your care.      You have the right to receive communications about your health information confidentially. You have the right to request restrictions on the uses and disclosures of your health information. You have the right to inspect, copy, request amendments and receive an accounting of to whom we have disclosed your health information.     You have the right to be provided with interpretation services if you do not speak English; to alternative communication techniques if you are hearing or vision impaired; and to have any other resources needed on your behalf to ensure effective communication. These services are provided free of charge.     You have a right to personal safety (free from mental, physical, sexual and verbal abuse, neglect and exploitation). You have the right to access protective and advocacy services.     Advance Directives  A Patient Advocate is available to meet with patients to answer questions regarding advance directives.    Living Will  A document that outlines what medical treatment the patient does or does not want in the event the patient becomes unable to make those decisions at the appropriate time.    Durable Medical Power of   A document by which the patient designates an individual to be responsible for making medical decisions in the event the patient becomes unable to do so.    HIPAA Notice of Privacy Practices  Your medical information is governed by federal  privacy laws. HIPAA protects private medical information and how that information is disclosed. If you have a question regarding the HIPAA Notice of Privacy Practices, or if you believe your privacy rights have been violated, you may call our designated hotline at ([phone].            Quality Improvement  Because we consistently strive to improve the care and service provided to our patients, we welcome your feedback. Your comments are an important part of our quality improvement process, as we like to know what we are doing right and which areas are in need of improvement. Our policy is to listen, be responsive and provide you with an appropriate and timely follow-up to your questions or concerns. Our goal is active patient and family involvement in all aspects of the care process.                                                                                  Reporting a Grievance or Complaint    During your time with the [facility] team you may have a grievance or complaint with our services. Your Patients Bill of Rights gives patients, families, and caregivers the right to express concerns and grievances and the right to expect a reasonable and timely response.     Your presentation of your concerns is not viewed negatively. It is an opportunity for us to improve the quality of our care and services we provide to you.     You may report your concerns directly to your , or you can phone in a complaint to:     Director of Outpatient Case Management  [phone]    You may also send a complaint letter to:    Director of Outpatient Case Management Services  [address] [place] [address]    Tell us the details of your complaint and provide us with a contact phone number so we can contact you to obtain additional information. We will return a call to you within two business days of our receipt of your complaint, and to request additional information as  needed. If you choose to mail a letter, your complaint may take a few days longer to reach us.     All grievances will be addressed as quickly as possible. A grievance or complaint that involves situations or practices which place patients in immediate danger will be addressed as an urgent matter. We will work to resolve all other complaints within seven days of receipt. By that time, you will receive a phone call with either the resolution of your complaint, or a plan for corrective action. A formal written response will be sent to you within 30 days of receipt of your grievance.     If a resolution cannot be completed within 30 days, a letter will be sent to you or your family member with an estimated time for the final response.    Additionally, all patients have the right to file complaints with external agencies, without exception. Complaints/grievances can be addressed to the following agencies:            Patient Safety or Quality of Care Concerns  Office of Quality Monitoring   [facility] [place] [address]  [phone] Toll Free    HIPPA Privacy/Security Concerns  Office for Civil Rights Region IV  [place] Department of Health & Human Services  [address] [place] [address], [address]  [place] [address] [phone] Phone  [phone] TDD  [phone] Toll Free    Medicare/Medicaid Billing Concerns  [place] for Medicare & Medicaid Services  Region [address]  [address] [place] [address], [address]  [place] [address] [phone] Phone  [phone] Toll Free    General Concerns  [place] [facility] ([facility])  [phone] Toll Free Complaint Hotline                                                              Consent Form/Release of Information    By signing--     (1) I agree I have read the Outpatient Case Management information provided to me;     (2) I agree to voluntarily participate in the Outpatient Case Management program;     (3) I understand I  must consent to participation in the Outpatient Case Management program during my first interview with my ;    (4) I consent to the discussion and release of my personal health information to my healthcare team (including my personal physician, my medical home care team, any specialty physician(s), and my [facility] team);     (5) I agree my consent is valid for the length of time I am receiving Outpatient Case Management;    (6) I agree to referrals to community resources which my Case Management team recommends for me. I agree to the release of my personal information and personal health information as necessary to referral sources.    ___________________________________________________________________  Patients Printed Name     ___________________________________________________________________  Patients Signature       Date    If patient is in being cared for, please complete this section:     ___________________________________________________________________  Printed Name of Person Caring For Patient   Relationship To Patient    ___________________________________________________________________   Signature of Person Caring For Patient     Date    PLEASE SIGN AND RETURN IN THE ENCLOSED PRE-ADDRESSED ENVELOPE.

## 2017-03-17 NOTE — PROGRESS NOTES
An OPCM welcome Packet and consent form was created and mailed to the patient on 3/17/17        Thank you,  Claudia Hastings, [facility]  OPCM Ext. [phone]

## 2017-03-21 RX ORDER — LANCETS
1 EACH MISCELLANEOUS 4 TIMES DAILY
Qty: 200 EACH | Refills: 3 | Status: SHIPPED | OUTPATIENT
Start: 2017-03-21 | End: 2023-01-26 | Stop reason: SDUPTHER

## 2017-03-22 ENCOUNTER — CLINICAL SUPPORT (OUTPATIENT)
Dept: REHABILITATION | Facility: HOSPITAL | Age: 47
End: 2017-03-22
Attending: PODIATRIST
Payer: MEDICARE

## 2017-03-22 ENCOUNTER — TELEPHONE (OUTPATIENT)
Dept: ENDOCRINOLOGY | Facility: CLINIC | Age: 47
End: 2017-03-22

## 2017-03-22 ENCOUNTER — TELEPHONE (OUTPATIENT)
Dept: FAMILY MEDICINE | Facility: CLINIC | Age: 47
End: 2017-03-22

## 2017-03-22 DIAGNOSIS — M62.81 MUSCLE WEAKNESS: ICD-10-CM

## 2017-03-22 DIAGNOSIS — M62.89 MUSCLE TIGHTNESS: Primary | ICD-10-CM

## 2017-03-22 DIAGNOSIS — M72.2 PLANTAR FASCIITIS, BILATERAL: ICD-10-CM

## 2017-03-22 PROCEDURE — 97110 THERAPEUTIC EXERCISES: CPT | Mod: PN

## 2017-03-22 NOTE — PROGRESS NOTES
Physical Therapy Daily Note     Name: Yanni Kaiser  Clinic Number: [id number]  Diagnosis:   Encounter Diagnoses   Name Primary?    Muscle tightness Yes    Plantar fasciitis, bilateral     Muscle weakness      Physician: Rosalva Taylor DPM  Precautions: standard  Visit #: 3 of 20  PTA Visit #: 1    Time In: 1340  Time Out: 1430  Total Treatment Time: 50 minutes (1:1 with PT 30 minutes of treatment session)    Subjective     Pt reports: Patient states she is tired as she has been running around all morning. Patient states her hands and feet are swollen. Patient reports compliance with HEP so far.  Pain Scale: Yanni rates pain on a scale of 0-10 to be 5 currently in bilateral feet.     Objective     All exercises performed on BLE    Yanni received individual therapeutic exercises to develop strength, endurance, ROM, flexibility and posture for 50 minutes including:  Upright bike x 5 minutes   Standing gastroc stretch 2x30 seconds leaning against table (runner stretch)  4 way ankle (YTB) 2x10 all 4 directions  Seated ankle ABCs x 2 rounds  Seated arch doming 2x10  Seated towel scrunch 2x10  Seated marble  x 1 trial  Seated windshield wipers 2x10  Seated great to extension stretch 2x20 seconds  Seated heel raises 2x10  Seated tennis ball roll x 2 minutes    Yanni received the following manual therapy techniques: Soft tissue Mobilization and manual stretching were applied to the: bilateral plantar fascia for 0 minutes including:    Written Home Exercises Provided: Reviewed current HEP with patient.   Pt demo good understanding of the education provided. Yanni demonstrated good return demonstration of activities.     Education provided re:Yanni verbalized good understanding of education provided.   No spiritual or educational barriers to learning provided    Assessment     Yanni tolerated treatment well today. Patient with excellent  tolerance to all exercises performed this session with training effect achieved and without the provocation of pain. Pt reports greatest relief of discomfort with self myofascial release using tennis ball. Pt presents with improved toe ROM and strength with marble pick ups compared to previous session. Plan to continue improving ankle and foot strength and ROM in the following sessions as tolerated.   This is a 46 y.o. female referred to outpatient physical therapy and presents with a medical diagnosis of bilateral foot pain and demonstrates limitations as described in the problem list. Pt prognosis is Good. Pt will continue to benefit from skilled outpatient physical therapy to address the deficits listed in the problem list, provide pt/family education and to maximize pt's level of independence in the home and community environment.     Goals as follows:  Short Term Goals (4 Weeks):   1. PT will report 20% reduction in bilateral foot pain for ease with playing with grandson  2. Pt will demonstrate 1/3 MMT improvement in bilateral ankle and foot strength for ease with assisting with ADL's  3. Pt will demonstrate SLS on each foot x10 seconds for ease with climbing stairs  Long Term Goals (8 Weeks):   1. Pt will report 50% reduction in bilateral foot pain for ease with ADL's  2. PT will demonstrate full ROM and strength in bilateral feet/ankles for ease with taking grandson to the park  3. Pt will report being independent with HEP for maintenance of improvements gained during therapy sessions  4. Pt will demonstrate SLS on ea LE x15 seconds for ease with playing with grandson  5. Pt will score <60% limitation on the FOTO for ease with return to participation within the community     Plan     Continue with established Plan of Care towards PT goals.    Therapist: Lseli Montelongo, PT  3/22/2017

## 2017-03-22 NOTE — TELEPHONE ENCOUNTER
bg log received. Despite instructions from prior phone call last week, pt is actually taking novolog 6 units ac and metformin 500 mg bid with no Levemir.   Advised her to stop Novolog and levemir as well. Increase metformin er to 2 tabs bid. Start this evening. Send bg log on Monday or on Friday if bgs are uncontrolled. She verbalized understanding.

## 2017-03-23 ENCOUNTER — OFFICE VISIT (OUTPATIENT)
Dept: FAMILY MEDICINE | Facility: CLINIC | Age: 47
End: 2017-03-23
Payer: MEDICARE

## 2017-03-23 ENCOUNTER — APPOINTMENT (OUTPATIENT)
Dept: RADIOLOGY | Facility: HOSPITAL | Age: 47
End: 2017-03-23
Attending: FAMILY MEDICINE
Payer: MEDICARE

## 2017-03-23 ENCOUNTER — TELEPHONE (OUTPATIENT)
Dept: FAMILY MEDICINE | Facility: CLINIC | Age: 47
End: 2017-03-23

## 2017-03-23 VITALS
DIASTOLIC BLOOD PRESSURE: 76 MMHG | WEIGHT: 293 LBS | TEMPERATURE: 100 F | RESPIRATION RATE: 18 BRPM | HEIGHT: 64 IN | BODY MASS INDEX: 50.02 KG/M2 | SYSTOLIC BLOOD PRESSURE: 138 MMHG | OXYGEN SATURATION: 96 % | HEART RATE: 108 BPM

## 2017-03-23 DIAGNOSIS — J45.901 ASTHMA EXACERBATION: Primary | ICD-10-CM

## 2017-03-23 DIAGNOSIS — J45.901 ASTHMA EXACERBATION: ICD-10-CM

## 2017-03-23 PROCEDURE — 3078F DIAST BP <80 MM HG: CPT | Mod: S$GLB,,, | Performed by: FAMILY MEDICINE

## 2017-03-23 PROCEDURE — 71020 XR CHEST PA AND LATERAL: CPT | Mod: TC,PN

## 2017-03-23 PROCEDURE — 1160F RVW MEDS BY RX/DR IN RCRD: CPT | Mod: S$GLB,,, | Performed by: FAMILY MEDICINE

## 2017-03-23 PROCEDURE — 99999 PR PBB SHADOW E&M-EST. PATIENT-LVL III: CPT | Mod: PBBFAC,,, | Performed by: FAMILY MEDICINE

## 2017-03-23 PROCEDURE — 71020 XR CHEST PA AND LATERAL: CPT | Mod: 26,,, | Performed by: RADIOLOGY

## 2017-03-23 PROCEDURE — 99214 OFFICE O/P EST MOD 30 MIN: CPT | Mod: 25,S$GLB,, | Performed by: FAMILY MEDICINE

## 2017-03-23 PROCEDURE — 99499 UNLISTED E&M SERVICE: CPT | Mod: S$GLB,,, | Performed by: FAMILY MEDICINE

## 2017-03-23 PROCEDURE — 94640 AIRWAY INHALATION TREATMENT: CPT | Mod: S$GLB,,, | Performed by: FAMILY MEDICINE

## 2017-03-23 PROCEDURE — 3075F SYST BP GE 130 - 139MM HG: CPT | Mod: S$GLB,,, | Performed by: FAMILY MEDICINE

## 2017-03-23 RX ORDER — GUAIFENESIN 1200 MG/1
1 TABLET, EXTENDED RELEASE ORAL 2 TIMES DAILY
Qty: 30 TABLET | Refills: 0 | Status: SHIPPED | OUTPATIENT
Start: 2017-03-23 | End: 2017-04-02

## 2017-03-23 RX ORDER — ALBUTEROL SULFATE 1.25 MG/3ML
1.25 SOLUTION RESPIRATORY (INHALATION) EVERY 6 HOURS PRN
Qty: 100 ML | Refills: 0 | Status: SHIPPED | OUTPATIENT
Start: 2017-03-23 | End: 2017-11-28 | Stop reason: SDUPTHER

## 2017-03-23 RX ORDER — IPRATROPIUM BROMIDE AND ALBUTEROL SULFATE 2.5; .5 MG/3ML; MG/3ML
3 SOLUTION RESPIRATORY (INHALATION) EVERY 6 HOURS PRN
Qty: 100 ML | Refills: 0 | Status: SHIPPED | OUTPATIENT
Start: 2017-03-23 | End: 2018-01-04 | Stop reason: SDUPTHER

## 2017-03-23 RX ORDER — AZITHROMYCIN 250 MG/1
TABLET, FILM COATED ORAL
Qty: 6 TABLET | Refills: 0 | Status: SHIPPED | OUTPATIENT
Start: 2017-03-23 | End: 2017-03-28

## 2017-03-23 RX ORDER — PREDNISONE 20 MG/1
40 TABLET ORAL DAILY
Qty: 10 TABLET | Refills: 0 | Status: SHIPPED | OUTPATIENT
Start: 2017-03-23 | End: 2017-03-28

## 2017-03-23 RX ORDER — IPRATROPIUM BROMIDE AND ALBUTEROL SULFATE 2.5; .5 MG/3ML; MG/3ML
3 SOLUTION RESPIRATORY (INHALATION)
Status: COMPLETED | OUTPATIENT
Start: 2017-03-23 | End: 2017-03-23

## 2017-03-23 RX ADMIN — IPRATROPIUM BROMIDE AND ALBUTEROL SULFATE 3 ML: 2.5; .5 SOLUTION RESPIRATORY (INHALATION) at 02:03

## 2017-03-23 NOTE — TELEPHONE ENCOUNTER
----- Message from Carlyle Gordillo MD sent at 3/23/2017  3:05 PM CDT -----  Please let patient know that xrays were normal with no evidence of pneumonia.    Thanks,  Dr. Gordillo

## 2017-03-23 NOTE — MR AVS SNAPSHOT
[facility]  [address] [place] [place] [address]  Phone: [phone]                  Yanni Kaiser   3/23/2017 2:00 PM   Office Visit    Description:  Female : 1970   Provider:  Carlyle Gordillo MD   Department:  [facility]           Reason for Visit     URI           Diagnoses this Visit        Comments    Asthma exacerbation    -  Primary            To Do List           Future Appointments        Provider Department Dept Phone    3/23/2017 3:00 PM [facility] [place] - Radiology [phone]    3/24/2017 10:30 AM Pj Harrell MD [facility] - Sleep Clinic [phone]    3/24/2017 1:30 PM Lesli Montelongo, PT [facility] [phone]    3/29/2017 1:30 PM Lesli Montelongo, PT [facility] [phone]    3/31/2017 1:30 PM Lesli Montelongo, PT [facility] [phone]      Goals (5 Years of Data)     Patient/caregiver will have an action plan in place to manage and prevent complications of diabetes prior to discharge from [facility].     Notes - Note edited  3/16/2017  2:17 PM by Makenzie Delgado RN    Overall Time to Completion  2 months from 2017    Short Term Goals  Patient/caregiver will measure and record the capillary blood glucose 4 times per day for 1 week.  Interventions   · Recognize and provide educational material (OMI).  · Assess for availability of working glucometer in home setting.   Status  · Partially met   · Met    Patient/caregiver will replace carbohydrate with fruit/vegetable for 2 meals per day within 1 week.  Interventions   · Recognize and provide educational material (TRINIDADMES).   Status  · Partially met   · Met    Patient/caregiver will verbalize 3 red flags of Hypoglycemia Hyperglycemia and know when to contact Physician within 2 weeks.  Interventions   · Recognize and provide educational material (KRAMES).   Status  · Partially met    Patient/caregiver will verbalize 3 signs  and symptoms of Hypotension Hypertension within 2 weeks.   Interventions   · Recognize and provide educational material (OMI).   Status  · Partially met    Patient/caregiver will verbalize 3 ways of preventing complications due to disease process within 2 weeks.  Interventions   · Recognize and provide educational material (OMI).  · Encourage Dietary Compliance.  · Review eating/nutrition habits.  · Complete medication reconciliation.  · Encourage Medication Compliance.   Status  · Partially met          Patient/caregiver will have an action plan in place to manage and prevent complications of falls prior to discharge from [facility].     Notes - Note edited  3/16/2017  2:17 PM by Makenzie Delgado RN    Overall Time to Completion  2 months from 03/09/2017    Short Term Goals  Patient/caregiver will verbalize importance of having a safe home environment by keeping room free of clutter, making sure rooms are well lit and removing throw rugs within 1 week.  Interventions   · Recognize and provide educational material (OMI).   Status  · Partially met   · Met            Follow-Up and Disposition     Return if symptoms worsen or fail to improve.       These Medications        Disp Refills Start End    predniSONE (DELTASONE) 20 MG tablet 10 tablet 0 3/23/2017 3/28/2017    Take 2 tablets (40 mg total) by mouth once daily. - Oral    Pharmacy: [facility] [address] [place] [address] [place] AT [facility] Ph #: [phone]       azithromycin (Z-RHODA) 250 MG tablet 6 tablet 0 3/23/2017 3/28/2017    Take 2 tablets by mouth on day 1; Take 1 tablet by mouth on days 2-5    Pharmacy: [facility] [address] [place] [address] [place] AT [facility] Ph #: [phone]       albuterol-ipratropium 2.5mg-0.5mg/3mL (DUO-NEB) 0.5 mg-3 mg(2.5 mg base)/3 mL nebulizer solution 100 mL 0 3/23/2017 3/23/2018    Take 3 mLs by nebulization every 6 (six) hours as needed for Wheezing or  Shortness of Breath. Rescue - Nebulization    Pharmacy: [address] [place] AT [facility] Ph #: [phone]       albuterol (ACCUNEB) 1.25 mg/3 mL Nebu 100 mL 0 3/23/2017 3/23/2018    Take 3 mLs (1.25 mg total) by nebulization every 6 (six) hours as needed. Rescue - Nebulization    Pharmacy: [address] [place] AT [facility] Ph #: [phone]       guaifenesin (MUCINEX) 1,200 mg Ta12 30 tablet 0 3/23/2017 4/2/2017    Take 1 tablet by mouth 2 (two) times daily. - Oral    Pharmacy: [address] [place] AT [facility] Ph #: [phone]         [facility]     [facility] On Call Nurse [facility] - 24/7 Assistance  Registered nurses in the [facility] On Call Center provide clinical advisement, health education, appointment booking, and other advisory services.  Call for this free service at [phone].             Medications           Message regarding Medications     Verify the changes and/or additions to your medication regime listed below are the same as discussed with your clinician today.  If any of these changes or additions are incorrect, please notify your healthcare provider.        START taking these NEW medications        Refills    predniSONE (DELTASONE) 20 MG tablet 0    Sig: Take 2 tablets (40 mg total) by mouth once daily.    Class: Normal    Route: Oral    azithromycin (Z-RHODA) 250 MG tablet 0    Sig: Take 2 tablets by mouth on day 1; Take 1 tablet by mouth on days 2-5    Class: Normal    albuterol-ipratropium 2.5mg-0.5mg/3mL (DUO-NEB) 0.5 mg-3 mg(2.5 mg base)/3 mL nebulizer solution 0    Sig: Take 3 mLs by nebulization every 6 (six) hours as needed for Wheezing or Shortness of Breath. Rescue    Class: Normal    Route: Nebulization    albuterol (ACCUNEB) 1.25 mg/3 mL Nebu 0    Sig: Take 3 mLs (1.25 mg total) by nebulization every 6 (six) hours as  needed. Rescue    Class: Normal    Route: Nebulization    guaifenesin (MUCINEX) 1,200 mg Ta12 0    Sig: Take 1 tablet by mouth 2 (two) times daily.    Class: Normal    Route: Oral      These medications were administered today        Dose Freq    albuterol-ipratropium 2.5mg-0.5mg/3mL nebulizer solution 3 mL 3 mL Clinic/HOD 1 time    Sig: Take 3 mLs by nebulization one time.    Class: Normal    Route: Nebulization      STOP taking these medications     ipratropium (ATROVENT) 0.02 % nebulizer solution Take 2.5 mLs (500 mcg total) by nebulization 4 (four) times daily.    levalbuterol (XOPENEX) 1.25 mg/0.5 mL nebulizer solution Take 0.5 mLs (1.25 mg total) by nebulization every 4 (four) hours as needed for Wheezing.           Verify that the below list of medications is an accurate representation of the medications you are currently taking.  If none reported, the list may be blank. If incorrect, please contact your healthcare provider. Carry this list with you in case of emergency.           Current Medications     acetaZOLAMIDE (DIAMOX) 500 mg CpSR Take 500 mg by mouth 2 (two) times daily.    atorvastatin (LIPITOR) 20 MG tablet Take 1 tablet (20 mg total) by mouth once daily.    blood sugar diagnostic Strp 1 each by Misc.(Non-Drug; Combo Route) route 4 (four) times daily before meals and nightly. ACCU CHEK ELVIA PLUS METER    blood-glucose meter Misc 1 each by Misc.(Non-Drug; Combo Route) route 4 (four) times daily before meals and nightly. ACCU CHEK ELVIA PLUS METER    butalbital-acetaminophen-caffeine -40 mg (FIORICET, ESGIC) -40 mg per tablet TK 1 T PO Q 8 H PRF PAIN OR HEADACHES    capsaicin 0.1 % Crea Apply 1 application topically 2 (two) times daily.    EPIPEN 2-RHODA 0.3 mg/0.3 mL (1:1,000) AtIn     fluticasone (FLONASE) 50 mcg/actuation nasal spray 1 spray by Each Nare route daily as needed.    fluticasone-vilanterol (BREO ELLIPTA) 200-25 mcg/dose DsDv diskus inhaler Inhale 1 puff into the lungs once  daily.    gabapentin (NEURONTIN) 300 MG capsule Take 2 capsules (600 mg total) by mouth every evening.    insulin aspart (NOVOLOG) 100 unit/mL injection Inject 15 Units into the skin 3 (three) times daily before meals.    insulin detemir (LEVEMIR FLEXTOUCH) 100 unit/mL (3 mL) SubQ InPn pen Inject 45 Units into the skin every evening.    lamotrigine (LAMICTAL) 25 MG tablet Take 1 tablet (25 mg total) by mouth 2 (two) times daily.    lancets (ACCU-CHEK SOFTCLIX LANCETS) Misc 1 Device by Misc.(Non-Drug; Combo Route) route 4 (four) times daily.    lancets 25 gauge Misc 1 lancet by Misc.(Non-Drug; Combo Route) route 4 (four) times daily before meals and nightly.    LINZESS 290 mcg Cap TK ONE CAPSULE PO D ON AN EMPTY STOMACH    losartan (COZAAR) 100 MG tablet Take 1 tablet (100 mg total) by mouth once daily.    metformin (GLUCOPHAGE-XR) 500 MG 24 hr tablet Take 2 tablets (1,000 mg total) by mouth 2 (two) times daily with meals.    montelukast (SINGULAIR) 10 mg tablet Take 1 tablet (10 mg total) by mouth once daily.    oxycodone-acetaminophen (PERCOCET) 5-325 mg per tablet Take 1 tablet by mouth every 8 (eight) hours as needed for Pain.    pantoprazole (PROTONIX) 40 MG tablet once daily.     primidone (MYSOLINE) 50 MG Tab Take 1 tablet (50 mg total) by mouth every evening.    TAZTIA  mg CpSR TK ONE CAPSULE PO D    topiramate (TOPAMAX) 200 MG Tab Take 1 tablet (200 mg total) by mouth 2 (two) times daily.    venlafaxine (EFFEXOR-XR) 75 MG 24 hr capsule Take 75 mg by mouth once daily.    albuterol (ACCUNEB) 1.25 mg/3 mL Nebu Take 3 mLs (1.25 mg total) by nebulization every 6 (six) hours as needed. Rescue    albuterol-ipratropium 2.5mg-0.5mg/3mL (DUO-NEB) 0.5 mg-3 mg(2.5 mg base)/3 mL nebulizer solution Take 3 mLs by nebulization every 6 (six) hours as needed for Wheezing or Shortness of Breath. Rescue    azithromycin (Z-RHODA) 250 MG tablet Take 2 tablets by mouth on day 1; Take 1 tablet by mouth on days 2-5     "guaifenesin (MUCINEX) 1,200 mg Ta12 Take 1 tablet by mouth 2 (two) times daily.    nystatin-triamcinolone (MYCOLOG) ointment Apply topically 2 (two) times daily.    predniSONE (DELTASONE) 20 MG tablet Take 2 tablets (40 mg total) by mouth once daily.           Clinical Reference Information           Your Vitals Were     BP Pulse Temp Resp Height Weight    138/76 (BP Location: Left arm, Patient Position: Sitting, BP Method: Manual) 108 99.7 °F (37.6 °C) (Oral) 18 5' 4" (1.626 m) 166 kg (365 lb 15.4 oz)    Last Period SpO2 BMI          10/24/2016 96% 62.82 kg/m2        Blood Pressure          Most Recent Value    BP  138/76      Allergies as of 3/23/2017     No Known Allergies      Immunizations Administered on Date of Encounter - 3/23/2017     None      Orders Placed During Today's Visit      Normal Orders This Visit    POCT Influenza A/B     Future Labs/Procedures Expected by Expires    X-Ray Chest PA And Lateral  3/23/2017 3/23/2018      Administrations This Visit     albuterol-ipratropium 2.5mg-0.5mg/3mL nebulizer solution 3 mL     Admin Date Action Dose Route Administered By             03/23/2017 Given 3 mL Nebulization Mariam Tran LPN                      [facility] Sign-Up     Activating your [facility] account is as easy as 1-2-3!     1) Visit [email or web address], select Sign Up Now, enter this activation code and your date of birth, then select Next.  [email or web address]  Expires: 4/9/2017  2:47 PM      2) Create a username and password to use when you visit [facility] in the future and select a security question in case you lose your password and select Next.    3) Enter your e-mail address and click Sign Up!    Additional Information  If you have questions, please e-mail [email or web address] or call [phone] to talk to our [facility] staff. Remember, [facility] is NOT to be used for urgent needs. For medical emergencies, dial 911.         Smoking Cessation     If you would like to quit smoking:   You may be " eligible for free services if you are a [place] resident and started smoking cigarettes before September 1, 1988.  Call the Smoking Cessation Trust ([facility]) toll free at (714) [phone] or (269) [phone].   Call 1-800-QUIT-NOW if you do not meet the above criteria.            Language Assistance Services     ATTENTION: Language assistance services are available, free of charge. Please call [phone].      ATENCIÓN: Si habla español, tiene a lee disposición servicios gratuitos de asistencia lingüística. Llame al [phone].     CHÚ Ý: N?u b?n nói Ti?ng Vi?t, có các d?ch v? h? tr? ngôn ng? mi?n phí dành cho b?n. G?i s? [phone].         [facility] complies with applicable Federal civil rights laws and does not discriminate on the basis of race, color, national origin, age, disability, or sex.

## 2017-03-23 NOTE — PROGRESS NOTES
Patient was administered breathing treatment as ordered, patient tolerated well. No complaints, no reactions noted.

## 2017-03-24 ENCOUNTER — OFFICE VISIT (OUTPATIENT)
Dept: SLEEP MEDICINE | Facility: CLINIC | Age: 47
End: 2017-03-24
Payer: MEDICARE

## 2017-03-24 VITALS
HEART RATE: 76 BPM | DIASTOLIC BLOOD PRESSURE: 79 MMHG | OXYGEN SATURATION: 98 % | WEIGHT: 293 LBS | SYSTOLIC BLOOD PRESSURE: 119 MMHG | BODY MASS INDEX: 61.68 KG/M2

## 2017-03-24 DIAGNOSIS — Z72.0 TOBACCO ABUSE: ICD-10-CM

## 2017-03-24 DIAGNOSIS — J45.40 ASTHMA, MODERATE PERSISTENT, POORLY-CONTROLLED: Primary | ICD-10-CM

## 2017-03-24 DIAGNOSIS — E66.01 MORBID OBESITY DUE TO EXCESS CALORIES: ICD-10-CM

## 2017-03-24 PROCEDURE — 99999 PR PBB SHADOW E&M-EST. PATIENT-LVL III: CPT | Mod: PBBFAC,,, | Performed by: INTERNAL MEDICINE

## 2017-03-24 PROCEDURE — 1160F RVW MEDS BY RX/DR IN RCRD: CPT | Mod: S$GLB,,, | Performed by: INTERNAL MEDICINE

## 2017-03-24 PROCEDURE — 99204 OFFICE O/P NEW MOD 45 MIN: CPT | Mod: S$GLB,,, | Performed by: INTERNAL MEDICINE

## 2017-03-24 PROCEDURE — 3078F DIAST BP <80 MM HG: CPT | Mod: S$GLB,,, | Performed by: INTERNAL MEDICINE

## 2017-03-24 PROCEDURE — 99499 UNLISTED E&M SERVICE: CPT | Mod: S$GLB,,, | Performed by: INTERNAL MEDICINE

## 2017-03-24 PROCEDURE — 3074F SYST BP LT 130 MM HG: CPT | Mod: S$GLB,,, | Performed by: INTERNAL MEDICINE

## 2017-03-24 NOTE — PROGRESS NOTES
Yanni Kaiser  was seen as a new patient at the request  Page, Carlyle KELLY MD for the evaluation of  Cough and sob.    CHIEF COMPLAINT:  Asthma      HISTORY OF PRESENT ILLNESS: Yanni Kaiser is a 46 y.o. female with pmh of obesity, asthma, dm2, htn, tobacco abuse, seizure d/o, gerd, dyslipidemia, ibs, dyslipidemia, pseudo tumor cerebri is here for pulmonary evaluation.  Patient smoke 1/2 since age 22.  Patient was diagnosed with asthma during childhood.  Patient is currently on breo and albuterol.  Patient with more frequent exacerbation during spring season.  Patient presented to pcp yesterday with cough and sob require prednisone, albuterol and azithromycin.  Per patient dyspnea and cough improve.  +chills but denied fever.  +nasal congestion.  GERD control with prilosec.  +alvarez x 2 blocks.      PAST MEDICAL HISTORY:    Active Ambulatory Problems     Diagnosis Date Noted    Body mass index 60.0-69.9, adult 03/27/2014    Mild intermittent asthma 03/27/2014    SHARATH (obstructive sleep apnea) 03/27/2014    Leg varices 03/27/2014    Low back pain 05/08/2014    Seizure disorder 09/03/2014    Type 2 diabetes mellitus, controlled 09/03/2014    Migraine 09/03/2014    Morbid obesity with BMI of 60.0-69.9, adult 09/03/2014    Tobacco abuse 09/03/2014    Anemia of chronic disease 06/10/2015    Mild protein malnutrition 06/10/2015    Weakness 06/28/2015    Allergic rhinitis 02/04/2016    Idiopathic intracranial hypertension 02/04/2016    Essential hypertension 02/04/2016    Combined hyperlipidemia associated with type 2 diabetes mellitus 02/04/2016    Depression 04/29/2016    Seizure 07/30/2016    Hyperlipidemia 07/30/2016    GERD (gastroesophageal reflux disease) 07/30/2016    Epilepsy 07/30/2016    Plantar fasciitis, bilateral 01/11/2017    Papilledema associated with increased intracranial pressure 01/13/2017    DUSTIN (acute kidney injury) 02/21/2017    Numbness of right hand 02/22/2017     Muscle tightness 2017    Muscle weakness 2017    Type 2 diabetes mellitus with stage 3 chronic kidney disease, without long-term current use of insulin 2017     Resolved Ambulatory Problems     Diagnosis Date Noted    Essential hypertension, benign 2014    Body mass index 60.0-69.9, adult 2014    Type 2 diabetes mellitus with renal manifestations, controlled 2014    Chronic kidney disease, stage II (mild) 2014    Asthma with acute exacerbation 2015    Acute chest pain 2016    Altered mental status 2016    Diabetic ketoacidosis without coma associated with type 2 diabetes mellitus 2017     Past Medical History:   Diagnosis Date    Allergy     Asthma     Diabetes mellitus, type 2     GERD (gastroesophageal reflux disease)     High cholesterol     Hypertension     Pseudotumor cerebri     Seizures     Sleep apnea                 PAST SURGICAL HISTORY:    Past Surgical History:   Procedure Laterality Date     SECTION      CHOLECYSTECTOMY      SINUS SURGERY           FAMILY HISTORY:                Family History   Problem Relation Age of Onset    Cancer Mother     Hypertension Mother     Diabetes Mother     Stroke Father     Heart disease Father     COPD Father     Heart attack Father     Cancer Maternal Grandmother        SOCIAL HISTORY:          Tobacco:   History   Smoking Status    Current Every Day Smoker    Packs/day: 1.00    Years: 15.00    Types: Cigarettes    Last attempt to quit: 6/10/2015   Smokeless Tobacco    Never Used     alcohol use:    History   Alcohol Use No               Occupation:  unemployed    ALLERGIES:  Review of patient's allergies indicates:  No Known Allergies    CURRENT MEDICATIONS:    Current Outpatient Prescriptions   Medication Sig Dispense Refill    acetaZOLAMIDE (DIAMOX) 500 mg CpSR Take 500 mg by mouth 2 (two) times daily.      albuterol (ACCUNEB) 1.25 mg/3 mL Nebu Take 3 mLs  (1.25 mg total) by nebulization every 6 (six) hours as needed. Rescue 100 mL 0    albuterol-ipratropium 2.5mg-0.5mg/3mL (DUO-NEB) 0.5 mg-3 mg(2.5 mg base)/3 mL nebulizer solution Take 3 mLs by nebulization every 6 (six) hours as needed for Wheezing or Shortness of Breath. Rescue 100 mL 0    atorvastatin (LIPITOR) 20 MG tablet Take 1 tablet (20 mg total) by mouth once daily. 90 tablet 1    azithromycin (Z-RHODA) 250 MG tablet Take 2 tablets by mouth on day 1; Take 1 tablet by mouth on days 2-5 6 tablet 0    blood sugar diagnostic Strp 1 each by Misc.(Non-Drug; Combo Route) route 4 (four) times daily before meals and nightly. ACCU CHEK ELVIA PLUS METER 200 each 3    blood-glucose meter Misc 1 each by Misc.(Non-Drug; Combo Route) route 4 (four) times daily before meals and nightly. ACCU CHEK ELVIA PLUS METER 1 each 0    butalbital-acetaminophen-caffeine -40 mg (FIORICET, ESGIC) -40 mg per tablet TK 1 T PO Q 8 H PRF PAIN OR HEADACHES  2    capsaicin 0.1 % Crea Apply 1 application topically 2 (two) times daily. 56.6 g 1    EPIPEN 2-RHODA 0.3 mg/0.3 mL (1:1,000) AtIn   1    fluticasone (FLONASE) 50 mcg/actuation nasal spray 1 spray by Each Nare route daily as needed. 16 g 1    fluticasone-vilanterol (BREO ELLIPTA) 200-25 mcg/dose DsDv diskus inhaler Inhale 1 puff into the lungs once daily. 1 each 3    gabapentin (NEURONTIN) 300 MG capsule Take 2 capsules (600 mg total) by mouth every evening. 180 capsule 11    guaifenesin (MUCINEX) 1,200 mg Ta12 Take 1 tablet by mouth 2 (two) times daily. 30 tablet 0    insulin aspart (NOVOLOG) 100 unit/mL injection Inject 15 Units into the skin 3 (three) times daily before meals. 13.5 mL 11    insulin detemir (LEVEMIR FLEXTOUCH) 100 unit/mL (3 mL) SubQ InPn pen Inject 45 Units into the skin every evening. 13.5 mL 11    lamotrigine (LAMICTAL) 25 MG tablet Take 1 tablet (25 mg total) by mouth 2 (two) times daily. 60 tablet 11    lancets (ACCU-CHEK SOFTCLIX LANCETS)  Misc 1 Device by Misc.(Non-Drug; Combo Route) route 4 (four) times daily. 200 each 3    lancets 25 gauge Misc 1 lancet by Misc.(Non-Drug; Combo Route) route 4 (four) times daily before meals and nightly. 200 each 11    LINZESS 290 mcg Cap TK ONE CAPSULE PO D ON AN EMPTY STOMACH  5    losartan (COZAAR) 100 MG tablet Take 1 tablet (100 mg total) by mouth once daily. 90 tablet 3    metformin (GLUCOPHAGE-XR) 500 MG 24 hr tablet Take 2 tablets (1,000 mg total) by mouth 2 (two) times daily with meals. 360 tablet 0    montelukast (SINGULAIR) 10 mg tablet Take 1 tablet (10 mg total) by mouth once daily. 30 tablet 5    oxycodone-acetaminophen (PERCOCET) 5-325 mg per tablet Take 1 tablet by mouth every 8 (eight) hours as needed for Pain. 90 tablet 0    pantoprazole (PROTONIX) 40 MG tablet once daily.   0    predniSONE (DELTASONE) 20 MG tablet Take 2 tablets (40 mg total) by mouth once daily. 10 tablet 0    primidone (MYSOLINE) 50 MG Tab Take 1 tablet (50 mg total) by mouth every evening. 90 tablet 3    TAZTIA  mg CpSR TK ONE CAPSULE PO D  2    topiramate (TOPAMAX) 200 MG Tab Take 1 tablet (200 mg total) by mouth 2 (two) times daily. 60 tablet 11    venlafaxine (EFFEXOR-XR) 75 MG 24 hr capsule Take 75 mg by mouth once daily.      nystatin-triamcinolone (MYCOLOG) ointment Apply topically 2 (two) times daily. 60 g 2     No current facility-administered medications for this visit.                   REVIEW OF SYSTEMS:     Pulmonary related symptoms as per HPI.  Gen:  no weight loss, no fever, no night sweat  HEENT:  + visual changes, no sore throat, no hearing loss  CV:  No chest pain, no orthopnea, no PND. Intermittent palpitation  GI:  no melena, no hematochezia, no diarhea, no constipation.  :  no dysuria, no hematuria, no hesistancy, no dribbling  Neuro:  no syncope, +occasional vertigo, no tinitus  Psych:  No homocide or suicide ideation; no depression.  Endocrine:  No heat or cold intolerance.  Sleep:   Was diagnosed with carole at [place] but did not tolerate titration  Otherwise, a balance of systems reviewed is negative.          PHYSICAL EXAM:  Vitals:    03/24/17 1045   BP: 119/79   Pulse: 76   SpO2: 98%   Weight: (!) 163 kg (359 lb 5.6 oz)   PainSc: 0-No pain     Body mass index is 61.68 kg/(m^2).     GENERAL:  well develop; no apparent distress  HEENT:  no nasal congestion; no discharge noted; class 4 modified mallampatti.   NECK:  supple; no palpable masses.  CARDIO: regular rate and rhythm  PULM:  +wheezing bilaterally; no intercostals retractions; no accessory muscle usage   ABDOMEN:  soft nontender/nondistended.  +bowel sound  EXTREMITIES no cce  NEURO:  CN II-XII intact.  5/5 motor in all extremities.  sensation grossly intact   to light touch.  PSYCH:  normal affect.  Alert and oriented x 4    LABS  Pulmonary Functions Testing Results: none  ABG 2/21/17 7.29/41/71/20 on room air  CXR:  3/23/17 no effusion or consolidation  CT CHEST:  None  4/29/16  CONCLUSIONS     1 - Normal left ventricular systolic function (EF 60-65%).     2 - The estimated PA systolic pressure is 36 mmHg.     3 - Trivial mitral regurgitation.     4 - Trivial tricuspid regurgitation.     5 - Trivial pulmonic regurgitation.   No evidence of stress induced myocardial ischemia.     Prior sleep study not available.      ASSESSMENT    ICD-10-CM ICD-9-CM    1. Asthma, moderate persistent, poorly-controlled J45.40 493.90    2. Tobacco abuse Z72.0 305.1    3. Morbid obesity due to excess calories E66.01 278.01        PLAN:  carole - s/p sleep study at .  Will try to get record from .      Obesity - aware of need for drastic weight loss.  Patient is candidate for bariatric surgery.  Will refer.    Asthma - encourage smoking cessation.  Continue with prednisone and azithromycin.  Continue with breo.  Optimize nasal congestion. May consider allergy.      Sinusitis - currently on azithromycin.  Sinus irrigation      Patient will Return in about  8 weeks (around 5/19/2017).      CC: Send copy of this note to Carlyle Gordillo MD

## 2017-03-24 NOTE — MR AVS SNAPSHOT
[facility]  [address] [place] [address]  Phone: [phone]  Fax: [phone]                  Yanni Kaiser   3/24/2017 10:30 AM   Office Visit    Description:  Female : 1970   Provider:  Pj Harrell MD   Department:  [facility]           Reason for Visit     Asthma           Diagnoses this Visit        Comments    Asthma, moderate persistent, poorly-controlled    -  Primary     Tobacco abuse         Morbid obesity due to excess calories                To Do List           Future Appointments        Provider Department Dept Phone    3/24/2017 1:30 PM Lesli Montelongo, PT [facility] [phone]    3/29/2017 1:30 PM Lesli Montelongo, PT [facility] [phone]    3/31/2017 1:30 PM Lesli Montelongo, PT [facility] [phone]    2017 10:00 AM Jessica Dwyer NP [place] - Endo/Diabetes [phone]    5/15/2017 9:40 AM Riccardo Moore MD [facility] - Neurology [phone]      Goals (5 Years of Data)     Patient/caregiver will have an action plan in place to manage and prevent complications of diabetes prior to discharge from [facility].     Notes - Note edited  3/16/2017  2:17 PM by Makenzie Delgado RN    Overall Time to Completion  2 months from 2017    Short Term Goals  Patient/caregiver will measure and record the capillary blood glucose 4 times per day for 1 week.  Interventions   · Recognize and provide educational material (OMI).  · Assess for availability of working glucometer in home setting.   Status  · Partially met   · Met    Patient/caregiver will replace carbohydrate with fruit/vegetable for 2 meals per day within 1 week.  Interventions   · Recognize and provide educational material (OMI).   Status  · Partially met   · Met    Patient/caregiver will verbalize 3 red flags of Hypoglycemia Hyperglycemia and know when to contact Physician within 2 weeks.  Interventions    · Recognize and provide educational material (KRAMES).   Status  · Partially met    Patient/caregiver will verbalize 3 signs and symptoms of Hypotension Hypertension within 2 weeks.   Interventions   · Recognize and provide educational material (KRAMES).   Status  · Partially met    Patient/caregiver will verbalize 3 ways of preventing complications due to disease process within 2 weeks.  Interventions   · Recognize and provide educational material (KRAMES).  · Encourage Dietary Compliance.  · Review eating/nutrition habits.  · Complete medication reconciliation.  · Encourage Medication Compliance.   Status  · Partially met          Patient/caregiver will have an action plan in place to manage and prevent complications of falls prior to discharge from [facility].     Notes - Note edited  3/16/2017  2:17 PM by Makenzie Delgado RN    Overall Time to Completion  2 months from 03/09/2017    Short Term Goals  Patient/caregiver will verbalize importance of having a safe home environment by keeping room free of clutter, making sure rooms are well lit and removing throw rugs within 1 week.  Interventions   · Recognize and provide educational material (KRAMES).   Status  · Partially met   · Met            Follow-Up and Disposition     Return in about 8 weeks (around 5/19/2017).      [facility]     [facility] On [facility] Nurse Care [facility] - 24/7 Assistance  Registered nurses in the [facility] provide clinical advisement, health education, appointment booking, and other advisory services.  Call for this free service at [phone].             Medications           Message regarding Medications     Verify the changes and/or additions to your medication regime listed below are the same as discussed with your clinician today.  If any of these changes or additions are incorrect, please notify your healthcare provider.             Verify that the below list of medications is an accurate representation of the medications you  are currently taking.  If none reported, the list may be blank. If incorrect, please contact your healthcare provider. Carry this list with you in case of emergency.           Current Medications     acetaZOLAMIDE (DIAMOX) 500 mg CpSR Take 500 mg by mouth 2 (two) times daily.    albuterol (ACCUNEB) 1.25 mg/3 mL Nebu Take 3 mLs (1.25 mg total) by nebulization every 6 (six) hours as needed. Rescue    albuterol-ipratropium 2.5mg-0.5mg/3mL (DUO-NEB) 0.5 mg-3 mg(2.5 mg base)/3 mL nebulizer solution Take 3 mLs by nebulization every 6 (six) hours as needed for Wheezing or Shortness of Breath. Rescue    atorvastatin (LIPITOR) 20 MG tablet Take 1 tablet (20 mg total) by mouth once daily.    azithromycin (Z-RHODA) 250 MG tablet Take 2 tablets by mouth on day 1; Take 1 tablet by mouth on days 2-5    blood sugar diagnostic Strp 1 each by Misc.(Non-Drug; Combo Route) route 4 (four) times daily before meals and nightly. ACCU CHEK ELVIA PLUS METER    blood-glucose meter Misc 1 each by Misc.(Non-Drug; Combo Route) route 4 (four) times daily before meals and nightly. ACCU CHEK ELVIA PLUS METER    butalbital-acetaminophen-caffeine -40 mg (FIORICET, ESGIC) -40 mg per tablet TK 1 T PO Q 8 H PRF PAIN OR HEADACHES    capsaicin 0.1 % Crea Apply 1 application topically 2 (two) times daily.    EPIPEN 2-RHODA 0.3 mg/0.3 mL (1:1,000) AtIn     fluticasone (FLONASE) 50 mcg/actuation nasal spray 1 spray by Each Nare route daily as needed.    fluticasone-vilanterol (BREO ELLIPTA) 200-25 mcg/dose DsDv diskus inhaler Inhale 1 puff into the lungs once daily.    gabapentin (NEURONTIN) 300 MG capsule Take 2 capsules (600 mg total) by mouth every evening.    guaifenesin (MUCINEX) 1,200 mg Ta12 Take 1 tablet by mouth 2 (two) times daily.    insulin aspart (NOVOLOG) 100 unit/mL injection Inject 15 Units into the skin 3 (three) times daily before meals.    insulin detemir (LEVEMIR FLEXTOUCH) 100 unit/mL (3 mL) SubQ InPn pen Inject 45 Units into  the skin every evening.    lamotrigine (LAMICTAL) 25 MG tablet Take 1 tablet (25 mg total) by mouth 2 (two) times daily.    lancets (ACCU-CHEK SOFTCLIX LANCETS) Misc 1 Device by Misc.(Non-Drug; Combo Route) route 4 (four) times daily.    lancets 25 gauge Misc 1 lancet by Misc.(Non-Drug; Combo Route) route 4 (four) times daily before meals and nightly.    LINZESS 290 mcg Cap TK ONE CAPSULE PO D ON AN EMPTY STOMACH    losartan (COZAAR) 100 MG tablet Take 1 tablet (100 mg total) by mouth once daily.    metformin (GLUCOPHAGE-XR) 500 MG 24 hr tablet Take 2 tablets (1,000 mg total) by mouth 2 (two) times daily with meals.    montelukast (SINGULAIR) 10 mg tablet Take 1 tablet (10 mg total) by mouth once daily.    oxycodone-acetaminophen (PERCOCET) 5-325 mg per tablet Take 1 tablet by mouth every 8 (eight) hours as needed for Pain.    pantoprazole (PROTONIX) 40 MG tablet once daily.     predniSONE (DELTASONE) 20 MG tablet Take 2 tablets (40 mg total) by mouth once daily.    primidone (MYSOLINE) 50 MG Tab Take 1 tablet (50 mg total) by mouth every evening.    TAZTIA  mg CpSR TK ONE CAPSULE PO D    topiramate (TOPAMAX) 200 MG Tab Take 1 tablet (200 mg total) by mouth 2 (two) times daily.    venlafaxine (EFFEXOR-XR) 75 MG 24 hr capsule Take 75 mg by mouth once daily.    nystatin-triamcinolone (MYCOLOG) ointment Apply topically 2 (two) times daily.           Clinical Reference Information           Your Vitals Were     BP Pulse Weight Last Period SpO2 BMI    119/79 76 163 kg (359 lb 5.6 oz) 10/24/2016 98% 61.68 kg/m2      Blood Pressure          Most Recent Value    BP  119/79      Allergies as of 3/24/2017     No Known Allergies      Immunizations Administered on Date of Encounter - 3/24/2017     None      Qonfdarwin Sign-Up     Activating your [facility] account is as easy as 1-2-3!     1) Visit [email or web address], select Sign Up Now, enter this activation code and your date of birth, then select  Next.  [email or web address]  Expires: 4/9/2017  2:47 PM      2) Create a username and password to use when you visit [facility] in the future and select a security question in case you lose your password and select Next.    3) Enter your e-mail address and click Sign Up!    Additional Information  If you have questions, please e-mail [email or web address] or call [phone] to talk to our [facility] staff. Remember, [facility] is NOT to be used for urgent needs. For medical emergencies, dial 911.         Instructions    1.  NeilMed Sinus Rinse Regular Kit    Recipe 1/4 teaspoon of salt and 1/4 teaspoon of baking soda in distilled water.  Be sure to tilt head forward as shown in picture.         Bariatric surgery [phone]        Smoking Cessation     If you would like to quit smoking:   You may be eligible for free services if you are a [place] resident and started smoking cigarettes before September 1, 1988.  Call the Smoking Cessation Trust ([facility]) toll free at ([phone]) 716-2395 or [phone].   Call 7-476-QUIT-NOW if you do not meet the above criteria.            Language Assistance Services     ATTENTION: Language assistance services are available, free of charge. Please call [phone].      ATENCIÓN: Si habla español, tiene a lee disposición servicios gratuitos de asistencia lingüística. Llame al [phone].     CHÚ Ý: N?u b?n nói Ti?ng Vi?t, có các d?ch v? h? tr? ngôn ng? mi?n phí dành cho b?n. G?i s? [phone].         [facility] complies with applicable Federal civil rights laws and does not discriminate on the basis of race, color, national origin, age, disability, or sex.

## 2017-03-24 NOTE — LETTER
March 24, 2017      Carlyle Gordillo MD  [address]  [place] [address]           [facility]  [address]  [place] [address]  Phone: [phone]  Fax: [phone]          Patient: Yanni Kaiser   MR Number: [id number]   YOB: 1970   Date of Visit: 3/24/2017       Dear Dr. Carlyle KELLY Page:    Thank you for referring Yanni Kaiser to me for evaluation. Attached you will find relevant portions of my assessment and plan of care.    If you have questions, please do not hesitate to call me. I look forward to following Yanni Kaiser along with you.    Sincerely,    Pj Harrell MD    Enclosure  CC:  No Recipients    If you would like to receive this communication electronically, please contact [email or web address] or [phone] to request more information on [employer] Link access.    For providers and/or their staff who would like to refer a patient to [facility], please contact us through our one-stop-shop provider referral line, [facility], at [phone].    If you feel you have received this communication in error or would no longer like to receive these types of communications, please e-mail [email or web address]

## 2017-03-24 NOTE — PATIENT INSTRUCTIONS
1.  NeilMed Sinus Rinse Regular Kit    Recipe 1/4 teaspoon of salt and 1/4 teaspoon of baking soda in distilled water.  Be sure to tilt head forward as shown in picture.         Bariatric surgery [phone]

## 2017-03-29 ENCOUNTER — TELEPHONE (OUTPATIENT)
Dept: ENDOCRINOLOGY | Facility: CLINIC | Age: 47
End: 2017-03-29

## 2017-03-29 ENCOUNTER — TELEPHONE (OUTPATIENT)
Dept: FAMILY MEDICINE | Facility: CLINIC | Age: 47
End: 2017-03-29

## 2017-03-29 NOTE — TELEPHONE ENCOUNTER
She completed a 5 day course of prednisone for her asthma exacerbation and has been using Novolog ss (Use on premeal readings: 150-200=+2, 201-250=+4; 251-300=+6; 301-350=+8, over 350=+10 units). Advised her to continue metformin 1000 mg bid, Novolog ss; and no Levemir. Keep appt with me next week. She verbalized understanding.

## 2017-03-29 NOTE — TELEPHONE ENCOUNTER
Spoke with patient and she informed me that this morning when she took her blood sugar (fasting) her level was 103. She said that she ate a egg and sausage  Croissant and a hash brown. She took her Metformin and that is all she ate today. Her blood sugar at 4:18pm is now 289. Patient denies any s/s of Hyperglycemia. Patient was informed that provider has gone for the day but will give him the message. Informed her to continue to monitor blood sugar and if blood sugar continue to escalate, she will need to be assess at the emergency room.

## 2017-03-30 NOTE — TELEPHONE ENCOUNTER
She should do her sliding scale for anything great than 200mg/dl.  If continues to have high blood sugars, she needs to let Jessica know.    Thanks,  Dr. Gordillo

## 2017-03-31 ENCOUNTER — TELEPHONE (OUTPATIENT)
Dept: FAMILY MEDICINE | Facility: CLINIC | Age: 47
End: 2017-03-31

## 2017-03-31 ENCOUNTER — OUTPATIENT CASE MANAGEMENT (OUTPATIENT)
Dept: ADMINISTRATIVE | Facility: OTHER | Age: 47
End: 2017-03-31

## 2017-03-31 NOTE — TELEPHONE ENCOUNTER
Spoke with Hanh- pharmacist at [facility], she states patient received three bottles of the singular on 02/17/17.     Left message for patient to contact clinic in regards of prescription and ankles and feet swelling.

## 2017-03-31 NOTE — TELEPHONE ENCOUNTER
----- Message from Makenzie Delgado RN sent at 3/31/2017  1:34 PM CDT -----  Yanni Christianson reports swelling to her feet and ankles. She said she called the office to report and hasn't heard back yet.     Also, [facility] is refusing to refill Singulair. They are telling her it's too early but she said she last filled on 2/18/17 for a 1 month supply.     Please advise patient.     Thanks,     Makenzie Delgado RN, [facility]  Outpatient Case Management  [facility]

## 2017-03-31 NOTE — PROGRESS NOTES
Follow up with pt. She states she has not been contacted by Bariatrics yet. She reports she has been sick with sinus problems and has been cancelling therapy. She completed Prednisone. She is monitoring her blood sugars daily. She takes Metformin 100 mg bid with S/S Novolog. Pt states she continues to smoke but has cut back since she's been sick. Pt reports edema to bilateral lower extremities. She also reports the pharmacy told her it's too early to fill Singulair. She states her last refill was 2/18. Will message Dr. Gordillo regarding above.

## 2017-04-04 ENCOUNTER — TELEPHONE (OUTPATIENT)
Dept: ENDOCRINOLOGY | Facility: CLINIC | Age: 47
End: 2017-04-04

## 2017-04-04 NOTE — TELEPHONE ENCOUNTER
Called and confirmed appointment with patient's  for Wed 4/5/17 at 10:00a. Patient's  verbalized confirmation. Also left voicemail on patient's cell phone.

## 2017-04-05 ENCOUNTER — OFFICE VISIT (OUTPATIENT)
Dept: ENDOCRINOLOGY | Facility: CLINIC | Age: 47
End: 2017-04-05
Payer: MEDICARE

## 2017-04-05 ENCOUNTER — OFFICE VISIT (OUTPATIENT)
Dept: FAMILY MEDICINE | Facility: CLINIC | Age: 47
End: 2017-04-05
Payer: MEDICARE

## 2017-04-05 ENCOUNTER — LAB VISIT (OUTPATIENT)
Dept: LAB | Facility: HOSPITAL | Age: 47
End: 2017-04-05
Attending: FAMILY MEDICINE
Payer: MEDICARE

## 2017-04-05 VITALS
SYSTOLIC BLOOD PRESSURE: 144 MMHG | DIASTOLIC BLOOD PRESSURE: 86 MMHG | HEART RATE: 76 BPM | HEIGHT: 64 IN | WEIGHT: 293 LBS | BODY MASS INDEX: 50.02 KG/M2

## 2017-04-05 VITALS
BODY MASS INDEX: 50.02 KG/M2 | HEIGHT: 64 IN | TEMPERATURE: 99 F | RESPIRATION RATE: 20 BRPM | OXYGEN SATURATION: 98 % | WEIGHT: 293 LBS | HEART RATE: 73 BPM | SYSTOLIC BLOOD PRESSURE: 118 MMHG | DIASTOLIC BLOOD PRESSURE: 84 MMHG

## 2017-04-05 DIAGNOSIS — G44.039 EPISODIC PAROXYSMAL HEMICRANIA, NOT INTRACTABLE: ICD-10-CM

## 2017-04-05 DIAGNOSIS — G47.30 SLEEP APNEA, UNSPECIFIED TYPE: ICD-10-CM

## 2017-04-05 DIAGNOSIS — R06.02 SHORTNESS OF BREATH: ICD-10-CM

## 2017-04-05 DIAGNOSIS — R23.2 HOT FLASHES: ICD-10-CM

## 2017-04-05 DIAGNOSIS — R53.83 FATIGUE, UNSPECIFIED TYPE: ICD-10-CM

## 2017-04-05 DIAGNOSIS — J45.20 MILD INTERMITTENT ASTHMA WITHOUT COMPLICATION: ICD-10-CM

## 2017-04-05 DIAGNOSIS — R60.0 LOCALIZED EDEMA: ICD-10-CM

## 2017-04-05 DIAGNOSIS — M79.641 RIGHT HAND PAIN: ICD-10-CM

## 2017-04-05 DIAGNOSIS — G44.039 EPISODIC PAROXYSMAL HEMICRANIA, NOT INTRACTABLE: Primary | ICD-10-CM

## 2017-04-05 DIAGNOSIS — G93.2 IIH (IDIOPATHIC INTRACRANIAL HYPERTENSION): ICD-10-CM

## 2017-04-05 DIAGNOSIS — E11.8 CONTROLLED TYPE 2 DIABETES MELLITUS WITH COMPLICATION, WITHOUT LONG-TERM CURRENT USE OF INSULIN: Primary | ICD-10-CM

## 2017-04-05 LAB — TSH SERPL DL<=0.005 MIU/L-ACNC: 1.84 UIU/ML

## 2017-04-05 PROCEDURE — 99213 OFFICE O/P EST LOW 20 MIN: CPT | Mod: S$GLB,,, | Performed by: NURSE PRACTITIONER

## 2017-04-05 PROCEDURE — 99999 PR PBB SHADOW E&M-EST. PATIENT-LVL IV: CPT | Mod: PBBFAC,,, | Performed by: NURSE PRACTITIONER

## 2017-04-05 PROCEDURE — 4010F ACE/ARB THERAPY RXD/TAKEN: CPT | Mod: S$GLB,,, | Performed by: NURSE PRACTITIONER

## 2017-04-05 PROCEDURE — 99499 UNLISTED E&M SERVICE: CPT | Mod: S$GLB,,, | Performed by: NURSE PRACTITIONER

## 2017-04-05 PROCEDURE — 84443 ASSAY THYROID STIM HORMONE: CPT

## 2017-04-05 PROCEDURE — 36415 COLL VENOUS BLD VENIPUNCTURE: CPT

## 2017-04-05 PROCEDURE — 83002 ASSAY OF GONADOTROPIN (LH): CPT

## 2017-04-05 PROCEDURE — 1160F RVW MEDS BY RX/DR IN RCRD: CPT | Mod: S$GLB,,, | Performed by: NURSE PRACTITIONER

## 2017-04-05 PROCEDURE — 83001 ASSAY OF GONADOTROPIN (FSH): CPT

## 2017-04-05 PROCEDURE — 3079F DIAST BP 80-89 MM HG: CPT | Mod: S$GLB,,, | Performed by: FAMILY MEDICINE

## 2017-04-05 PROCEDURE — 1160F RVW MEDS BY RX/DR IN RCRD: CPT | Mod: S$GLB,,, | Performed by: FAMILY MEDICINE

## 2017-04-05 PROCEDURE — 99215 OFFICE O/P EST HI 40 MIN: CPT | Mod: S$GLB,,, | Performed by: FAMILY MEDICINE

## 2017-04-05 PROCEDURE — 3046F HEMOGLOBIN A1C LEVEL >9.0%: CPT | Mod: S$GLB,,, | Performed by: NURSE PRACTITIONER

## 2017-04-05 PROCEDURE — 99999 PR PBB SHADOW E&M-EST. PATIENT-LVL IV: CPT | Mod: PBBFAC,,, | Performed by: FAMILY MEDICINE

## 2017-04-05 PROCEDURE — 3077F SYST BP >= 140 MM HG: CPT | Mod: S$GLB,,, | Performed by: NURSE PRACTITIONER

## 2017-04-05 PROCEDURE — 3074F SYST BP LT 130 MM HG: CPT | Mod: S$GLB,,, | Performed by: FAMILY MEDICINE

## 2017-04-05 PROCEDURE — 3079F DIAST BP 80-89 MM HG: CPT | Mod: S$GLB,,, | Performed by: NURSE PRACTITIONER

## 2017-04-05 PROCEDURE — 99499 UNLISTED E&M SERVICE: CPT | Mod: S$GLB,,, | Performed by: FAMILY MEDICINE

## 2017-04-05 RX ORDER — OXYCODONE AND ACETAMINOPHEN 10; 325 MG/1; MG/1
1 TABLET ORAL EVERY 4 HOURS PRN
Qty: 60 TABLET | Refills: 0 | Status: SHIPPED | OUTPATIENT
Start: 2017-04-05 | End: 2017-04-05 | Stop reason: SDUPTHER

## 2017-04-05 RX ORDER — ALBUTEROL SULFATE 90 UG/1
2 AEROSOL, METERED RESPIRATORY (INHALATION) EVERY 6 HOURS PRN
Qty: 18 G | Refills: 0 | Status: SHIPPED | OUTPATIENT
Start: 2017-04-05 | End: 2017-11-28 | Stop reason: SDUPTHER

## 2017-04-05 RX ORDER — OXYCODONE AND ACETAMINOPHEN 10; 325 MG/1; MG/1
1 TABLET ORAL EVERY 4 HOURS PRN
Qty: 60 TABLET | Refills: 0 | Status: SHIPPED | OUTPATIENT
Start: 2017-04-05 | End: 2017-05-26 | Stop reason: SDUPTHER

## 2017-04-05 RX ORDER — FUROSEMIDE 20 MG/1
20 TABLET ORAL DAILY
Qty: 5 TABLET | Refills: 0 | Status: SHIPPED | OUTPATIENT
Start: 2017-04-05 | End: 2017-07-17

## 2017-04-05 NOTE — MR AVS SNAPSHOT
[facility]  [address]  [place] [address]  Phone: [phone]                  Yanni Kaiser   2017 2:00 PM   Office Visit    Description:  Female : 1970   Provider:  Carlyle Gordillo MD   Department:  [facility]           Reason for Visit     Headache     Cough           Diagnoses this Visit        Comments    Episodic paroxysmal hemicrania, not intractable    -  Primary     Fatigue, unspecified type         Hot flashes         IIH (idiopathic intracranial hypertension)         Localized edema         Mild intermittent asthma without complication         Right hand pain                To Do List           Future Appointments        Provider Department Dept Phone    5/15/2017 9:40 AM Riccardo Moore MD [facility] - Neurology [phone]    2017 1:45 PM Rosalva Taylor DPM [facility] - Podiatry [phone]      Goals (5 Years of Data)              3/31/17    Patient/caregiver will have an action plan in place to manage and prevent complications of diabetes prior to discharge from [facility].   On track    Notes - Note edited  3/16/2017  2:17 PM by Makenzie Delgado RN    Overall Time to Completion  2 months from 2017    Short Term Goals  Patient/caregiver will measure and record the capillary blood glucose 4 times per day for 1 week.  Interventions   · Recognize and provide educational material (OMI).  · Assess for availability of working glucometer in home setting.   Status  · Partially met   · Met    Patient/caregiver will replace carbohydrate with fruit/vegetable for 2 meals per day within 1 week.  Interventions   · Recognize and provide educational material (OMI).   Status  · Partially met   · Met    Patient/caregiver will verbalize 3 red flags of Hypoglycemia Hyperglycemia and know when to contact Physician within 2 weeks.  Interventions   · Recognize and provide educational material (OMI).   Status  · Partially  met    Patient/caregiver will verbalize 3 signs and symptoms of Hypotension Hypertension within 2 weeks.   Interventions   · Recognize and provide educational material (OMI).   Status  · Partially met    Patient/caregiver will verbalize 3 ways of preventing complications due to disease process within 2 weeks.  Interventions   · Recognize and provide educational material (OMI).  · Encourage Dietary Compliance.  · Review eating/nutrition habits.  · Complete medication reconciliation.  · Encourage Medication Compliance.   Status  · Partially met          Patient/caregiver will have an action plan in place to manage and prevent complications of falls prior to discharge from [facility].   On track    Notes - Note edited  3/16/2017  2:17 PM by Makenzie Delgado RN    Overall Time to Completion  2 months from 03/09/2017    Short Term Goals  Patient/caregiver will verbalize importance of having a safe home environment by keeping room free of clutter, making sure rooms are well lit and removing throw rugs within 1 week.  Interventions   · Recognize and provide educational material (OMI).   Status  · Partially met   · Met            Follow-Up and Disposition     Return if symptoms worsen or fail to improve.       These Medications        Disp Refills Start End    furosemide (LASIX) 20 MG tablet 5 tablet 0 4/5/2017 4/10/2017    Take 1 tablet (20 mg total) by mouth once daily. - Oral    Pharmacy: [email or web address] [address] [place] [address] [place] AT [facility] Ph #: [phone]       albuterol 90 mcg/actuation inhaler 18 g 0 4/5/2017 4/5/2018    Inhale 2 puffs into the lungs every 6 (six) hours as needed for Wheezing. Rescue - Inhalation    Pharmacy: [email or web address] [address] [place] [address] [address] [place] AT [facility] Ph #: [phone]       oxycodone-acetaminophen (PERCOCET)  mg per tablet 60 tablet 0 4/5/2017     Take 1 tablet by mouth every 4 (four) hours as needed for  Pain. - Oral    Pharmacy: [facility] [id number]  [place] [address] [place] AT [facility] Ph #: [phone]         [facility]     [facility] On Call Nurse Care Line - 24/7 Assistance  Unless otherwise directed by your provider, please contact [facility], our nurse care line that is available for 24/7 assistance.     Registered nurses in the [facility] provide: appointment scheduling, clinical advisement, health education, and other advisory services.  Call: [phone] (toll free)               Medications           Message regarding Medications     Verify the changes and/or additions to your medication regime listed below are the same as discussed with your clinician today.  If any of these changes or additions are incorrect, please notify your healthcare provider.        START taking these NEW medications        Refills    furosemide (LASIX) 20 MG tablet 0    Sig: Take 1 tablet (20 mg total) by mouth once daily.    Class: Normal    Route: Oral    albuterol 90 mcg/actuation inhaler 0    Sig: Inhale 2 puffs into the lungs every 6 (six) hours as needed for Wheezing. Rescue    Class: Normal    Route: Inhalation    oxycodone-acetaminophen (PERCOCET)  mg per tablet 0    Sig: Take 1 tablet by mouth every 4 (four) hours as needed for Pain.    Class: Print    Route: Oral      STOP taking these medications     butalbital-acetaminophen-caffeine -40 mg (FIORICET, ESGIC) -40 mg per tablet TK 1 T PO Q 8 H PRF PAIN OR HEADACHES    oxycodone-acetaminophen (PERCOCET) 5-325 mg per tablet Take 1 tablet by mouth every 8 (eight) hours as needed for Pain.           Verify that the below list of medications is an accurate representation of the medications you are currently taking.  If none reported, the list may be blank. If incorrect, please contact your healthcare provider. Carry this list with you in case of emergency.           Current Medications     acetaZOLAMIDE  (DIAMOX) 500 mg CpSR Take 500 mg by mouth 2 (two) times daily.    albuterol (ACCUNEB) 1.25 mg/3 mL Nebu Take 3 mLs (1.25 mg total) by nebulization every 6 (six) hours as needed. Rescue    albuterol-ipratropium 2.5mg-0.5mg/3mL (DUO-NEB) 0.5 mg-3 mg(2.5 mg base)/3 mL nebulizer solution Take 3 mLs by nebulization every 6 (six) hours as needed for Wheezing or Shortness of Breath. Rescue    atorvastatin (LIPITOR) 20 MG tablet Take 1 tablet (20 mg total) by mouth once daily.    blood sugar diagnostic Strp 1 each by Misc.(Non-Drug; Combo Route) route 4 (four) times daily before meals and nightly. ACCU CHEK ELVIA PLUS METER    blood-glucose meter Misc 1 each by Misc.(Non-Drug; Combo Route) route 4 (four) times daily before meals and nightly. ACCU CHEK ELVIA PLUS METER    capsaicin 0.1 % Crea Apply 1 application topically 2 (two) times daily.    EPIPEN 2-RHODA 0.3 mg/0.3 mL (1:1,000) AtIn     fluticasone (FLONASE) 50 mcg/actuation nasal spray 1 spray by Each Nare route daily as needed.    fluticasone-vilanterol (BREO ELLIPTA) 200-25 mcg/dose DsDv diskus inhaler Inhale 1 puff into the lungs once daily.    gabapentin (NEURONTIN) 300 MG capsule Take 2 capsules (600 mg total) by mouth every evening.    lamotrigine (LAMICTAL) 25 MG tablet Take 1 tablet (25 mg total) by mouth 2 (two) times daily.    lancets (ACCU-CHEK SOFTCLIX LANCETS) Misc 1 Device by Misc.(Non-Drug; Combo Route) route 4 (four) times daily.    lancets 25 gauge Misc 1 lancet by Misc.(Non-Drug; Combo Route) route 4 (four) times daily before meals and nightly.    LINZESS 290 mcg Cap TK ONE CAPSULE PO D ON AN EMPTY STOMACH    losartan (COZAAR) 100 MG tablet Take 1 tablet (100 mg total) by mouth once daily.    metformin (GLUCOPHAGE-XR) 500 MG 24 hr tablet Take 2 tablets (1,000 mg total) by mouth 2 (two) times daily with meals.    montelukast (SINGULAIR) 10 mg tablet Take 1 tablet (10 mg total) by mouth once daily.    pantoprazole (PROTONIX) 40 MG tablet once daily.      "primidone (MYSOLINE) 50 MG Tab Take 1 tablet (50 mg total) by mouth every evening.    TAZTIA  mg CpSR TK ONE CAPSULE PO D    topiramate (TOPAMAX) 200 MG Tab Take 1 tablet (200 mg total) by mouth 2 (two) times daily.    venlafaxine (EFFEXOR-XR) 75 MG 24 hr capsule Take 75 mg by mouth once daily.    albuterol 90 mcg/actuation inhaler Inhale 2 puffs into the lungs every 6 (six) hours as needed for Wheezing. Rescue    furosemide (LASIX) 20 MG tablet Take 1 tablet (20 mg total) by mouth once daily.    nystatin-triamcinolone (MYCOLOG) ointment Apply topically 2 (two) times daily.    oxycodone-acetaminophen (PERCOCET)  mg per tablet Take 1 tablet by mouth every 4 (four) hours as needed for Pain.           Clinical Reference Information           Your Vitals Were     BP Pulse Temp Resp Height Weight    118/84 (BP Location: Right arm, Patient Position: Sitting, BP Method: Manual) 73 98.7 °F (37.1 °C) (Oral) 20 5' 4" (1.626 m) 163.7 kg (361 lb)    Last Period SpO2 BMI          (Exact Date) 98% 61.97 kg/m2        Blood Pressure          Most Recent Value    BP  118/84      Allergies as of 4/5/2017     No Known Allergies      Immunizations Administered on Date of Encounter - 4/5/2017     None      Orders Placed During Today's Visit      Normal Orders This Visit    Ambulatory referral to Orthopedics     Future Labs/Procedures Expected by Expires    Follicle stimulating hormone  4/5/2017 6/4/2018    Luteinizing hormone  4/5/2017 6/4/2018    TSH  4/5/2017 4/5/2018      [facility] Sign-Up     Activating your [facility] account is as easy as 1-2-3!     1) Visit [email or web address], select Sign Up Now, enter this activation code and your date of birth, then select Next.  [email or web address]  Expires: 4/9/2017  2:47 PM      2) Create a username and password to use when you visit [facility] in the future and select a security question in case you lose your password and select Next.    3) Enter your e-mail address and click Sign " Up!    Additional Information  If you have questions, please e-mail [email or web address] or call [phone] to talk to our [facility] staff. Remember, [facility] is NOT to be used for urgent needs. For medical emergencies, dial 911.         Smoking Cessation     If you would like to quit smoking:   You may be eligible for free services if you are a [place] resident and started smoking cigarettes before September 1, 1988.  Call the Smoking Cessation Trust ([facility]) toll free at [phone] or [phone].   Call 8-800-QUIT-NOW if you do not meet the above criteria.   Contact us via email: [email or web address]   View our website for more information: [email or web address]        Language Assistance Services     ATTENTION: Language assistance services are available, free of charge. Please call [phone].      ATENCIÓN: Si habla español, tiene a lee disposición servicios gratuitos de asistencia lingüística. Llame al [phone].     CHÚ Ý: N?u b?n nói Ti?ng Vi?t, có các d?ch v? h? tr? ngôn ng? mi?n phí dành cho b?n. G?i s? [phone].         [facility] complies with applicable Federal civil rights laws and does not discriminate on the basis of race, color, national origin, age, disability, or sex.

## 2017-04-05 NOTE — PATIENT INSTRUCTIONS
A1C goal: <7%   Fasting/premeal blood glucose goal:   2 hour post-meal blood glucose goal: less than 180

## 2017-04-05 NOTE — PROGRESS NOTES
CC: This 46 y.o. Black or  female  is here for evaluation of  T2DM along with comorbidities indicated in the Visit Diagnosis section of this encounter.    HPI: Yanni Kaiser was diagnosed with T2DM by her eye doctor ~2010. Metformin started at the time of diagnosis.     Initial visit on 3/9/17  New to Endocrine. Referred by Dr. Gordillo. Two weeks ago, she was admitted to [facility] on 2/21/17 for 2 days for DKA w/ BG of 800 upon admit. She was discharged on insulin and has been on it since. Had not been on any other meds for diabetes other than metformin 500 mg/day until now. However, she stopped taking metformin at the same time that she started insulin.   Prior to DKA episode she had received steroids at an ED visit on 1/16/17 for an asthma attack. Then she got a steroid shot for her foot pain on 2/20/17.   Smokes 0.5 ppd.   Followed by Dr. Horner, Neurology, for idiopathic intracranial HTN and chronic HA.    Plan Recent DKA episode d/t recent steroid therapy. She continues to be in a highly insulin resistant, glucose-toxic state. Will continue insulin and add metformin back with goal of weaning off of insulin. Advised pt -   1. Starting with dinner tonight, resume metformin  mg.   2. Reduce Novolog to 10 units before meals starting with dinner with tonight.   3. Reduce Levemir to 35 units every night starting tonight.   4. Continue to test glucoses before meals and bedtime.   5. Send a glucose log in 1 week.  6. Call with any issues, especially if your glucoses are less than 70 for no known reason.   rtc in 4 weeks.     Interval history  Vision has improved but still gets worse when bg rises. Yesterday forgot metformin and had snoball. bg maria isabel to 238.     PMXH: asthma, pseduotumor cerebri,  seizures without recent episodes, sleep apnea not on CPAP d/t discomfort. Anemia.     LAST DIABETES EDUCATION: never     HOSPITALIZED FOR DIABETES -  Yes - in 2017 for the first time, as  "above.     PRESCRIBED DIABETES MEDICATIONS: metformin er 1000 mg bid     Misses medication doses - No    DM COMPLICATIONS: peripheral neuropathy    SIGNIFICANT DIABETES MED HISTORY: denies    SELF MONITORING BLOOD GLUCOSE: tests bg 4x/day. Glucoses mostly 90-130s. Highest was in the 200s r/t dietary indiscretions or shortly after prednisone course.       HYPOGLYCEMIC EPISODES: denies     CURRENT DIET: 3 meals/day. Drinks sprite zeros and water.     CURRENT EXERCISE: none      BP (!) 144/86 (BP Location: Right arm, Patient Position: Sitting, BP Method: Automatic)  Pulse 76  Ht 5' 4" (1.626 m)  Wt (!) 163.9 kg (361 lb 5.3 oz)  BMI 62.02 kg/m2      ROS:   CONSTITUTIONAL: Appetite good, denies fatigue  EYES: no visual disturbances unless bgs are high   RESPIRATORY: + shortness of breath or cough  OTHER: n/a      PHYSICAL EXAM:  GENERAL: Well developed, well nourished. No acute distress.   PSYCH: AAOx3, appropriate mood and affect, conversant, well-groomed. Judgement and insight good.   NEURO: Cranial nerves grossly intact. Speech clear, no tremor.       Hemoglobin A1C   Date Value Ref Range Status   02/21/2017 10.3 (H) 4.5 - 6.2 % Final     Comment:     According to ADA guidelines, hemoglobin A1C <7.0% represents  optimal control in non-pregnant diabetic patients.  Different  metrics may apply to specific populations.   Standards of Medical Care in Diabetes - 2016.  For the purpose of screening for the presence of diabetes:  <5.7%     Consistent with the absence of diabetes  5.7-6.4%  Consistent with increasing risk for diabetes   (prediabetes)  >or=6.5%  Consistent with diabetes  Currently no consensus exists for use of hemoglobin A1C  for diagnosis of diabetes for children.     01/11/2017 6.7 (H) 4.5 - 6.2 % Final     Comment:     According to ADA guidelines, hemoglobin A1C <7.0% represents  optimal control in non-pregnant diabetic patients.  Different  metrics may apply to specific populations.   Standards of " Medical Care in Diabetes - 2016.  For the purpose of screening for the presence of diabetes:  <5.7%     Consistent with the absence of diabetes  5.7-6.4%  Consistent with increasing risk for diabetes   (prediabetes)  >or=6.5%  Consistent with diabetes  Currently no consensus exists for use of hemoglobin A1C  for diagnosis of diabetes for children.     10/17/2016 6.6 (H) 4.5 - 6.2 % Final     Comment:     According to ADA guidelines, hemoglobin A1C <7.0% represents  optimal control in non-pregnant diabetic patients.  Different  metrics may apply to specific populations.   Standards of Medical Care in Diabetes - 2016.  For the purpose of screening for the presence of diabetes:  <5.7%     Consistent with the absence of diabetes  5.7-6.4%  Consistent with increasing risk for diabetes   (prediabetes)  >or=6.5%  Consistent with diabetes  Currently no consensus exists for use of hemoglobin A1C  for diagnosis of diabetes for children.             Chemistry        Component Value Date/Time     02/24/2017 1504    K 3.6 02/24/2017 1504     02/24/2017 1504    CO2 25 02/24/2017 1504    BUN 12 02/24/2017 1504    CREATININE 1.2 02/24/2017 1504     (H) 02/24/2017 1504        Component Value Date/Time    CALCIUM 8.9 02/24/2017 1504    ALKPHOS 94 02/24/2017 1504    AST 14 02/24/2017 1504    ALT 22 02/24/2017 1504    BILITOT 0.3 02/24/2017 1504             Ref. Range 2/22/2017 05:23 2/23/2017 06:55 2/24/2017 15:04   BUN, Bld Latest Ref Range: 6 - 20 mg/dL 20 14 12   Creatinine Latest Ref Range: 0.5 - 1.4 mg/dL 1.3 1.2 1.2   eGFR if non African American Latest Ref Range: >60 mL/min/1.73 m^2 50 (A) 55 (A) 55 (A)   eGFR if African American Latest Ref Range: >60 mL/min/1.73 m^2 58 (A) >60 >60       Lab Results   Component Value Date    LDLCALC 80.0 07/30/2016        Ref. Range 7/30/2016 05:34   Cholesterol Latest Ref Range: 120 - 199 mg/dL 145   HDL Latest Ref Range: 40 - 75 mg/dL 52   LDL Cholesterol Latest Ref Range:  63.0 - 159.0 mg/dL 80.0   Total Cholesterol/HDL Ratio Latest Ref Range: 2.0 - 5.0  2.8   Triglycerides Latest Ref Range: 30 - 150 mg/dL 65     Lab Results   Component Value Date    LIZYLBCLESLIEAT 3.7 10/19/2015           STANDARDS of CARE:        ASA:               ACEI/ARB:         Statin:         Last eye exam:       ASSESSMENT and PLAN:    A1C goal: <7%  Fasting/premeal blood glucose goal:   2 hour post-meal blood glucose goal: less than 180    1. Controlled type 2 diabetes mellitus with complication, without long-term current use of insulin  Continue metformin. No insulin needed. Maintain good die. rtc prn and will transfer pt back to Dr. Gordillo for chronic DM management.    2. Sleep apnea, unspecified type  F/u with Dr. Harrell.    3. Shortness of breath  She has a f/u appt with Dr. Gordillo today already.          No orders of the defined types were placed in this encounter.       Return if symptoms worsen or fail to improve.

## 2017-04-05 NOTE — PROGRESS NOTES
Routine Office Visit    Patient Name: Yanni Kaiser    : 1970  MRN: [id number]    Subjective:  Yanni is a 46 y.o. female who presents today for:    1. Edema in legs  Patient presenting with edema in bilateral lower extremities.  She states that she was on losartan-hctz for the the fluid, but was taken off due to blood pressure getting too low.  There has been no chest pain.  Patient does smoke regularly and does have asthma.  She does not take any CCB's for HTN.  She does not take her inhalers as prescribed resulting in uncontrolled asthma (in addition to being a smoker).      2.  Left sided headache  Patient states that she has IIH and his followed by neurology.  She states that she has been having persistent headaches that effect the left side of her head and face.  There has been no blurred vision, numbness, or weakness.  She states that she has had 2 LP's in the past to remove pressure.  She states that she takes Diamox twice daily as prescribed.  She states that she probably needs to see her neurologist again soon.      3.  Asthma  Patient states that she sees Dr. hart with pulmonary and that he told her to stay on Breo.  She states that the Breo is not working because it did not help when she had her asthma exacerbation a few weeks ago.  She states that she only takes it when she gets short of breath.  She states that she will usually take Advair (not Breo), singulair, and albuterol when she begins to get short of breath and that usually helps.  She continues to have fatigue since her last exacerbation and also has a productive cough.  She is not taking mucinex anymore    4.  Hot flashes  Patient has been having irregularies in her menstrual cycle and is now having hot flashes.  She is seen by OBGYN and was told she may need a hysterectomy, but has not done so yet.  She states that she is scheduled to see OBGYN soon.  She denies heavy periods or chronic pelvic pain.      6.  Right hand  pain  Patient presenting today with right hand pain that she states is mainly in the palm, thumb, and first 3 fingers.  She states that there are days she cant make a fist due to weakness.  She has never been tested for carpal tunnel.  She denies any injury to the hand.  There is no numbness or tingling.      Past Medical History  Past Medical History:   Diagnosis Date    Allergy     Asthma     Diabetes mellitus, type 2     GERD (gastroesophageal reflux disease)     High cholesterol     Hypertension     Pseudotumor cerebri     Seizures     Sleep apnea        Past Surgical History  Past Surgical History:   Procedure Laterality Date     SECTION      CHOLECYSTECTOMY      SINUS SURGERY         Family History  Family History   Problem Relation Age of Onset    Cancer Mother     Hypertension Mother     Diabetes Mother     Stroke Father     Heart disease Father     COPD Father     Heart attack Father     Cancer Maternal Grandmother        Social History  Social History     Social History    Marital status:      Spouse name: N/A    Number of children: N/A    Years of education: N/A     Occupational History    Not on file.     Social History Main Topics    Smoking status: Current Every Day Smoker     Packs/day: 1.00     Years: 15.00     Types: Cigarettes     Last attempt to quit: 6/10/2015    Smokeless tobacco: Never Used    Alcohol use No    Drug use: No    Sexual activity: Yes     Partners: Male     Birth control/ protection: None     Other Topics Concern    Not on file     Social History Narrative     I have reviewed history    Current Medications  Current Outpatient Prescriptions on File Prior to Visit   Medication Sig Dispense Refill    acetaZOLAMIDE (DIAMOX) 500 mg CpSR Take 500 mg by mouth 2 (two) times daily.      albuterol (ACCUNEB) 1.25 mg/3 mL Nebu Take 3 mLs (1.25 mg total) by nebulization every 6 (six) hours as needed. Rescue 100 mL 0    albuterol-ipratropium  2.5mg-0.5mg/3mL (DUO-NEB) 0.5 mg-3 mg(2.5 mg base)/3 mL nebulizer solution Take 3 mLs by nebulization every 6 (six) hours as needed for Wheezing or Shortness of Breath. Rescue 100 mL 0    atorvastatin (LIPITOR) 20 MG tablet Take 1 tablet (20 mg total) by mouth once daily. 90 tablet 1    blood sugar diagnostic Strp 1 each by Misc.(Non-Drug; Combo Route) route 4 (four) times daily before meals and nightly. ACCU CHEK ELVIA PLUS METER 200 each 3    blood-glucose meter Misc 1 each by Misc.(Non-Drug; Combo Route) route 4 (four) times daily before meals and nightly. ACCU CHEK ELVIA PLUS METER 1 each 0    capsaicin 0.1 % Crea Apply 1 application topically 2 (two) times daily. 56.6 g 1    EPIPEN 2-RHODA 0.3 mg/0.3 mL (1:1,000) AtIn   1    fluticasone (FLONASE) 50 mcg/actuation nasal spray 1 spray by Each Nare route daily as needed. 16 g 1    fluticasone-vilanterol (BREO ELLIPTA) 200-25 mcg/dose DsDv diskus inhaler Inhale 1 puff into the lungs once daily. 1 each 3    gabapentin (NEURONTIN) 300 MG capsule Take 2 capsules (600 mg total) by mouth every evening. 180 capsule 11    lamotrigine (LAMICTAL) 25 MG tablet Take 1 tablet (25 mg total) by mouth 2 (two) times daily. 60 tablet 11    lancets (ACCU-CHEK SOFTCLIX LANCETS) Misc 1 Device by Misc.(Non-Drug; Combo Route) route 4 (four) times daily. 200 each 3    lancets 25 gauge Misc 1 lancet by Misc.(Non-Drug; Combo Route) route 4 (four) times daily before meals and nightly. 200 each 11    LINZESS 290 mcg Cap TK ONE CAPSULE PO D ON AN EMPTY STOMACH  5    losartan (COZAAR) 100 MG tablet Take 1 tablet (100 mg total) by mouth once daily. 90 tablet 3    metformin (GLUCOPHAGE-XR) 500 MG 24 hr tablet Take 2 tablets (1,000 mg total) by mouth 2 (two) times daily with meals. 360 tablet 0    montelukast (SINGULAIR) 10 mg tablet Take 1 tablet (10 mg total) by mouth once daily. 30 tablet 5    pantoprazole (PROTONIX) 40 MG tablet once daily.   0    primidone (MYSOLINE) 50 MG Tab  "Take 1 tablet (50 mg total) by mouth every evening. 90 tablet 3    TAZTIA  mg CpSR TK ONE CAPSULE PO D  2    topiramate (TOPAMAX) 200 MG Tab Take 1 tablet (200 mg total) by mouth 2 (two) times daily. 60 tablet 11    venlafaxine (EFFEXOR-XR) 75 MG 24 hr capsule Take 75 mg by mouth once daily.      [DISCONTINUED] butalbital-acetaminophen-caffeine -40 mg (FIORICET, ESGIC) -40 mg per tablet TK 1 T PO Q 8 H PRF PAIN OR HEADACHES  2    [DISCONTINUED] oxycodone-acetaminophen (PERCOCET) 5-325 mg per tablet Take 1 tablet by mouth every 8 (eight) hours as needed for Pain. 90 tablet 0    nystatin-triamcinolone (MYCOLOG) ointment Apply topically 2 (two) times daily. 60 g 2    [DISCONTINUED] insulin aspart (NOVOLOG) 100 unit/mL injection Inject 15 Units into the skin 3 (three) times daily before meals. 13.5 mL 11    [DISCONTINUED] insulin detemir (LEVEMIR FLEXTOUCH) 100 unit/mL (3 mL) SubQ InPn pen Inject 45 Units into the skin every evening. 13.5 mL 11     No current facility-administered medications on file prior to visit.      I have reviewed medications    Allergies   Review of patient's allergies indicates:  No Known Allergies    Review of Systems (Pertinent positives)  Review of Systems   Constitutional: Positive for malaise/fatigue.   HENT: Positive for congestion.    Eyes: Negative.    Respiratory: Positive for cough and sputum production. Negative for hemoptysis and wheezing.    Cardiovascular: Positive for leg swelling.   Gastrointestinal: Negative.    Musculoskeletal: Negative.    Skin: Negative.    Neurological: Positive for headaches.         /84 (BP Location: Right arm, Patient Position: Sitting, BP Method: Manual)  Pulse 73  Temp 98.7 °F (37.1 °C) (Oral)   Resp 20  Ht 5' 4" (1.626 m)  Wt (!) 163.7 kg (361 lb)  LMP  (Exact Date)  SpO2 98%  BMI 61.97 kg/m2    GENERAL APPEARANCE: in no apparent distress and well developed and well nourished  HEENT: PERRL, EOMI, Sclera clear, " anicteric, Oropharynx clear, no lesions, Neck supple with midline trachea  NECK: normal, supple, no adenopathy, thyroid normal in size  RESPIRATORY: appears well, vitals normal, no respiratory distress, acyanotic, normal RR, chest clear, no wheezing, crepitations, rhonchi, normal symmetric air entry  HEART: regular rate and rhythm, S1, S2 normal, no murmur, click, rub or gallop.    ABDOMEN: abdomen is soft without tenderness, no masses, no hernias, no organomegaly, no rebound, no guarding. Suprapubic tenderness absent. No CVA tenderness.  NEUROLOGIC: normal without focal findings, CN II-XII are intact.    Extremities: warm/well perfused.  No abnormal hair patterns.  No clubbing, cyanosis; +1 edema in bilateral lower extremities; negative Tinnels in right hand  SKIN: no rashes, no wounds, no other lesions  PSYCH: Alert, oriented x 3, thought content appropriate, speech normal, pleasant and cooperative, good eye contact, well groomed    Assessment/Plan:  Yanni Kaiser is a 46 y.o. female who presents today for :    Yanni was seen today for headache and cough.    Diagnoses and all orders for this visit:    Episodic paroxysmal hemicrania, not intractable  -     TSH; Future  -     Follicle stimulating hormone; Future  -     Luteinizing hormone; Future  -     Discontinue: oxycodone-acetaminophen (PERCOCET)  mg per tablet; Take 1 tablet by mouth every 4 (four) hours as needed for Pain.  -     oxycodone-acetaminophen (PERCOCET)  mg per tablet; Take 1 tablet by mouth every 4 (four) hours as needed for Pain.    Fatigue, unspecified type  -     TSH; Future  -     Follicle stimulating hormone; Future  -     Luteinizing hormone; Future    Hot flashes  -     TSH; Future  -     Follicle stimulating hormone; Future  -     Luteinizing hormone; Future    IIH (idiopathic intracranial hypertension)  -     Discontinue: oxycodone-acetaminophen (PERCOCET)  mg per tablet; Take 1 tablet by mouth every 4 (four)  hours as needed for Pain.  -     oxycodone-acetaminophen (PERCOCET)  mg per tablet; Take 1 tablet by mouth every 4 (four) hours as needed for Pain.    Localized edema  -     furosemide (LASIX) 20 MG tablet; Take 1 tablet (20 mg total) by mouth once daily.    Mild intermittent asthma without complication  -     albuterol 90 mcg/actuation inhaler; Inhale 2 puffs into the lungs every 6 (six) hours as needed for Wheezing. Rescue    Right hand pain  -     Ambulatory referral to Orthopedics      1.  Labs to be done today  2.  Patient has IIH followed by neurology.  Was told to contact neurologist for persistent headaches  3.  Lasix for edema  4.  Albuterol for asthma and encouraged her to quit smoking   5.  mucinex for persistent cough  6.  She is to follow up with pulmonary for asthma  7.  Referring to ortho for right hand pain    Carlyle Gordillo MD

## 2017-04-06 LAB
FSH SERPL-ACNC: 61.5 MIU/ML
LH SERPL-ACNC: 31 MIU/ML

## 2017-04-07 ENCOUNTER — LAB VISIT (OUTPATIENT)
Dept: LAB | Facility: HOSPITAL | Age: 47
End: 2017-04-07
Attending: FAMILY MEDICINE
Payer: MEDICARE

## 2017-04-07 DIAGNOSIS — N18.30 TYPE 2 DIABETES MELLITUS WITH STAGE 3 CHRONIC KIDNEY DISEASE, WITHOUT LONG-TERM CURRENT USE OF INSULIN: ICD-10-CM

## 2017-04-07 DIAGNOSIS — E11.22 TYPE 2 DIABETES MELLITUS WITH STAGE 3 CHRONIC KIDNEY DISEASE, WITHOUT LONG-TERM CURRENT USE OF INSULIN: ICD-10-CM

## 2017-04-07 LAB
ALBUMIN SERPL BCP-MCNC: 3.2 G/DL
ALP SERPL-CCNC: 63 U/L
ALT SERPL W/O P-5'-P-CCNC: 14 U/L
ANION GAP SERPL CALC-SCNC: 8 MMOL/L
AST SERPL-CCNC: 11 U/L
BILIRUB SERPL-MCNC: 0.3 MG/DL
BUN SERPL-MCNC: 12 MG/DL
CALCIUM SERPL-MCNC: 8.8 MG/DL
CHLORIDE SERPL-SCNC: 109 MMOL/L
CO2 SERPL-SCNC: 24 MMOL/L
CREAT SERPL-MCNC: 1.1 MG/DL
EST. GFR  (AFRICAN AMERICAN): >60 ML/MIN/1.73 M^2
EST. GFR  (NON AFRICAN AMERICAN): >60 ML/MIN/1.73 M^2
GLUCOSE SERPL-MCNC: 83 MG/DL
POTASSIUM SERPL-SCNC: 4.3 MMOL/L
PROT SERPL-MCNC: 7.2 G/DL
SODIUM SERPL-SCNC: 141 MMOL/L

## 2017-04-07 PROCEDURE — 36415 COLL VENOUS BLD VENIPUNCTURE: CPT

## 2017-04-07 PROCEDURE — 80053 COMPREHEN METABOLIC PANEL: CPT

## 2017-04-10 ENCOUNTER — OUTPATIENT CASE MANAGEMENT (OUTPATIENT)
Dept: ADMINISTRATIVE | Facility: OTHER | Age: 47
End: 2017-04-10

## 2017-04-10 NOTE — LETTER
April 10, 2017    Yanni Kaiser  [address] [facility] [address] [place] [id number]             [facility] [address] [place] [address] Dear Ms. Kaiser:    As per our conversation today, I am sending you the phone number for [facility]'s smoking cessation program. Please call [phone] to schedule an appointment.       If you have any questions or concerns, please don't hesitate to call.  My number is [phone].    Sincerely,        Makenzie Delgado RN

## 2017-04-10 NOTE — PROGRESS NOTES
Follow up with pt. Pt states she was taken off insulin. She is only taking Metformin now. She states she was released back to Dr. Gordillo for diabetes management. She state she is have sinus congestion, sinus headache noted blood when she blows her nose. She states she has a call to Dr. Gordillo's office. Pt continue to smoke. Discussed importance of quitting to improve her resp status. Pt agrees to schedule appt with smoking cessation. She states she is in the car and cannot write the number. She requests the information be mailed to her home. Will place pt in monitoring status.

## 2017-04-11 ENCOUNTER — TELEPHONE (OUTPATIENT)
Dept: FAMILY MEDICINE | Facility: CLINIC | Age: 47
End: 2017-04-11

## 2017-04-11 NOTE — TELEPHONE ENCOUNTER
----- Message from Carlyle Gordillo MD sent at 4/11/2017  2:00 PM CDT -----  Please let patient know that it is normal to have nose bleeds especially if blowing her nose a lot.  She should get over the counter nasal saline spray and this will help.    Thanks,  Dr. Gordillo     ----- Message -----     From: Evelina Clark LPN     Sent: 4/10/2017  12:20 PM       To: Carlyle Gordillo MD        ----- Message -----     From: Bridget Oseguera     Sent: 4/10/2017   9:33 AM       To: Rand Tadeo Staff    Patient states maybe sinuses are bleeding from the inside. Patient states there were streaks of blood in her mucus. Please call at [phone] Thank you!

## 2017-04-12 NOTE — TELEPHONE ENCOUNTER
Patient notified of charted test result and recommendation for nose bleed. Patient said that she has been using the over the counter nasal saline and this has helped.

## 2017-04-12 NOTE — TELEPHONE ENCOUNTER
----- Message from Carlyle Gordillo MD sent at 4/12/2017  7:34 AM CDT -----  Please let patient know all of her labs are normal.    Thanks,  Dr. Gordillo

## 2017-04-15 ENCOUNTER — HOSPITAL ENCOUNTER (EMERGENCY)
Facility: HOSPITAL | Age: 47
Discharge: HOME | End: 2017-04-15
Attending: EMERGENCY MEDICINE
Payer: MEDICARE

## 2017-04-15 VITALS
HEIGHT: 64 IN | WEIGHT: 293 LBS | TEMPERATURE: 98 F | OXYGEN SATURATION: 98 % | RESPIRATION RATE: 20 BRPM | DIASTOLIC BLOOD PRESSURE: 82 MMHG | SYSTOLIC BLOOD PRESSURE: 134 MMHG | BODY MASS INDEX: 50.02 KG/M2 | HEART RATE: 76 BPM

## 2017-04-15 DIAGNOSIS — G40.909 SEIZURE DISORDER: Primary | Chronic | ICD-10-CM

## 2017-04-15 LAB
ALBUMIN SERPL BCP-MCNC: 3.2 G/DL
ALP SERPL-CCNC: 66 U/L
ALT SERPL W/O P-5'-P-CCNC: 23 U/L
AMPHET+METHAMPHET UR QL: NEGATIVE
ANION GAP SERPL CALC-SCNC: 10 MMOL/L
AST SERPL-CCNC: 18 U/L
B-HCG UR QL: NEGATIVE
B-HCG UR QL: NEGATIVE
BARBITURATES UR QL SCN>200 NG/ML: NORMAL
BASOPHILS # BLD AUTO: 0.06 K/UL
BASOPHILS NFR BLD: 0.8 %
BENZODIAZ UR QL SCN>200 NG/ML: NEGATIVE
BILIRUB SERPL-MCNC: 0.2 MG/DL
BUN SERPL-MCNC: 8 MG/DL
BZE UR QL SCN: NEGATIVE
CALCIUM SERPL-MCNC: 8.9 MG/DL
CANNABINOIDS UR QL SCN: NEGATIVE
CHLORIDE SERPL-SCNC: 110 MMOL/L
CO2 SERPL-SCNC: 22 MMOL/L
CREAT SERPL-MCNC: 0.9 MG/DL
CREAT UR-MCNC: 164.6 MG/DL
CTP QC/QA: YES
DIFFERENTIAL METHOD: ABNORMAL
EOSINOPHIL # BLD AUTO: 0.5 K/UL
EOSINOPHIL NFR BLD: 7.3 %
ERYTHROCYTE [DISTWIDTH] IN BLOOD BY AUTOMATED COUNT: 18.7 %
EST. GFR  (AFRICAN AMERICAN): >60 ML/MIN/1.73 M^2
EST. GFR  (NON AFRICAN AMERICAN): >60 ML/MIN/1.73 M^2
GLUCOSE SERPL-MCNC: 98 MG/DL
HCT VFR BLD AUTO: 31.8 %
HGB BLD-MCNC: 9.8 G/DL
LYMPHOCYTES # BLD AUTO: 1.7 K/UL
LYMPHOCYTES NFR BLD: 23.5 %
MAGNESIUM SERPL-MCNC: 2 MG/DL
MCH RBC QN AUTO: 24.4 PG
MCHC RBC AUTO-ENTMCNC: 30.8 %
MCV RBC AUTO: 79 FL
METHADONE UR QL SCN>300 NG/ML: NEGATIVE
MONOCYTES # BLD AUTO: 0.9 K/UL
MONOCYTES NFR BLD: 12 %
NEUTROPHILS # BLD AUTO: 4.2 K/UL
NEUTROPHILS NFR BLD: 56.4 %
OPIATES UR QL SCN: NEGATIVE
PCP UR QL SCN>25 NG/ML: NEGATIVE
PLATELET # BLD AUTO: 387 K/UL
PMV BLD AUTO: 9.3 FL
POTASSIUM SERPL-SCNC: 3.9 MMOL/L
PROT SERPL-MCNC: 6.4 G/DL
RBC # BLD AUTO: 4.02 M/UL
SODIUM SERPL-SCNC: 142 MMOL/L
TOXICOLOGY INFORMATION: NORMAL
WBC # BLD AUTO: 7.39 K/UL

## 2017-04-15 PROCEDURE — 99284 EMERGENCY DEPT VISIT MOD MDM: CPT | Mod: 25

## 2017-04-15 PROCEDURE — 85025 COMPLETE CBC W/AUTO DIFF WBC: CPT

## 2017-04-15 PROCEDURE — 80307 DRUG TEST PRSMV CHEM ANLYZR: CPT

## 2017-04-15 PROCEDURE — 96374 THER/PROPH/DIAG INJ IV PUSH: CPT

## 2017-04-15 PROCEDURE — 82570 ASSAY OF URINE CREATININE: CPT

## 2017-04-15 PROCEDURE — 81025 URINE PREGNANCY TEST: CPT | Performed by: EMERGENCY MEDICINE

## 2017-04-15 PROCEDURE — 81025 URINE PREGNANCY TEST: CPT

## 2017-04-15 PROCEDURE — 63600175 PHARM REV CODE 636 W HCPCS: Performed by: EMERGENCY MEDICINE

## 2017-04-15 PROCEDURE — 83735 ASSAY OF MAGNESIUM: CPT

## 2017-04-15 PROCEDURE — 80053 COMPREHEN METABOLIC PANEL: CPT

## 2017-04-15 RX ORDER — BUTALBITAL, ACETAMINOPHEN AND CAFFEINE 50; 325; 40 MG/1; MG/1; MG/1
1 TABLET ORAL EVERY 4 HOURS PRN
COMMUNITY
End: 2017-05-25

## 2017-04-15 RX ORDER — AMOXICILLIN AND CLAVULANATE POTASSIUM 875; 125 MG/1; MG/1
1 TABLET, FILM COATED ORAL
COMMUNITY
End: 2017-04-19

## 2017-04-15 RX ORDER — HYDROMORPHONE HYDROCHLORIDE 2 MG/ML
1 INJECTION, SOLUTION INTRAMUSCULAR; INTRAVENOUS; SUBCUTANEOUS
Status: COMPLETED | OUTPATIENT
Start: 2017-04-15 | End: 2017-04-15

## 2017-04-15 RX ADMIN — HYDROMORPHONE HYDROCHLORIDE 1 MG: 2 INJECTION INTRAMUSCULAR; INTRAVENOUS; SUBCUTANEOUS at 05:04

## 2017-04-15 NOTE — DISCHARGE INSTRUCTIONS
Discharge Instructions for Epilepsy  You have been diagnosed with epilepsy, a disorder of recurring seizures. When you have a seizure, an electrical disturbance happens in your brain. There are different kinds of seizures, and each patient may have one or many types of seizures. Here are some guidelines for you and your family.  If you have a seizure  Ask friends and family members to learn seizure management. Also, tell them to do the following if you have a seizure:  · Clear the area to prevent injury.  · Position you on a flat, carpeted surface, if possible.  · Dont try to restrain you.  · Dont put anything in your mouth.  · Turn you onto your side if you start to vomit.  · Keep track of the date and time the seizure started, how long it lasted, whether or not you lost consciousness, a description of your body movements, what provoked the seizure (if known), and any injuries you suffered. Using a watch may help keep correct time of events.    · Stay with you until you regain consciousness.  · Call 911 if the seizure is longer than 5 minutes, if there are multiple seizures, or if you do not begin to wake up after the seizure stops.    Activities  Following are some things to consider:  · Enjoy your normal activities. Most people with epilepsy lead normal lives.  · Avoid hazardous activities, such as mountain climbing or scuba diving. A seizure under these conditions could lead to a fatal accident.  · Do not swim alone or participate in other similar activities without others nearby.  · Ask your healthcare provider about any restrictions on driving or other activities.  · Check with your state department of public safety to learn whether there are any driving limitations based on your condition.  Other home care  Other considerations:  · Take your medicine exactly as directed. Skipping doses can affect the way your body handles the medicine, which could cause you to have a seizure.  · Dont drink alcohol or use  any medicine without talking with your healthcare provider first.  · Seizure medicines may interact with other medicines. Make sure all of your healthcare providers have a list of all your medicines.   · Birth control pills may not work as effectively when taking seizure medicines. Ask your healthcare provider if a change in birth control is needed.   · Wear a medical alert pendant or bracelet that alerts others to your condition, especially if you are allergic to seizure medicine.  · Join a local support group. Ask your healthcare provider for names and phone numbers.  .  When to seek medical attention  Tell your family members or friends to call 911 right away if you have:  · Seizure that lasts more than 5 minutes  · Multiple seizures in a row  · No recovery of consciousness after the seizure stops  Otherwise, have them call your healthcare provider right away if you have:  · Seizures that are getting longer and worse  · Seizures that are different from those youve had in the past  · Seizures strong enough to cause injury  · Skin rash  · Fever of 100.4°F (38°C) or higher, or as directed by your healthcare provider   Date Last Reviewed: 11/5/2015  © 9059-5424 [employer]. [address] [place] [address], [place] [address]. All rights reserved. This information is not intended as a substitute for professional medical care. Always follow your healthcare professional's instructions.

## 2017-04-15 NOTE — ED PROVIDER NOTES
Encounter Date: 4/15/2017    SCRIBE #1 NOTE: I, Deepa Garcia, am scribing for, and in the presence of,  Flako Birght MD. I have scribed the following portions of the note - Other sections scribed: HPI/ROS.       History     Chief Complaint   Patient presents with    Seizures     seizure x 1, pt postical on EMS arrival     Review of patient's allergies indicates:  No Known Allergies  HPI Comments: CC: Seizures    HPI: This 46 y.o. female with pseudotumor cerebri, seizures, GERD, hypertension, asthma, DM and high cholesterol presents to the ED via EMS with her  after a seizure that occurred just prior to arrival.  The family reports the patient was not responding as usual and had a syncopal episode.  Per EMS she has been lethargic since arrival.  The  states this is the typical for her during a seizure.  He also reports she had been c/o a headache for the past 4 days.  No reported treatment was attempted prior to arrival.  No other symptoms reported.       The history is provided by the spouse.     Past Medical History:   Diagnosis Date    Allergy     Asthma     Diabetes mellitus, type 2     GERD (gastroesophageal reflux disease)     High cholesterol     Hypertension     Pseudotumor cerebri     Seizures     Sleep apnea      Past Surgical History:   Procedure Laterality Date     SECTION      CHOLECYSTECTOMY      SINUS SURGERY       Family History   Problem Relation Age of Onset    Cancer Mother     Hypertension Mother     Diabetes Mother     Stroke Father     Heart disease Father     COPD Father     Heart attack Father     Cancer Maternal Grandmother      Social History   Substance Use Topics    Smoking status: Current Every Day Smoker     Packs/day: 1.00     Years: 15.00     Types: Cigarettes     Last attempt to quit: 6/10/2015    Smokeless tobacco: Never Used    Alcohol use No     Review of Systems   Constitutional: Negative for fever.   HENT: Negative for ear pain.     Eyes: Negative for visual disturbance.   Respiratory: Negative for cough and shortness of breath.    Cardiovascular: Negative for chest pain.   Gastrointestinal: Negative for nausea and vomiting.   Genitourinary: Negative for difficulty urinating and dysuria.   Musculoskeletal: Negative for back pain.   Skin: Negative for rash.   Neurological: Positive for seizures, syncope and headaches.       Physical Exam   Initial Vitals   BP Pulse Resp Temp SpO2   04/15/17 1406 04/15/17 1406 04/15/17 1406 04/15/17 1406 04/15/17 1406   144/91 77 16 98.8 °F (37.1 °C) 94 %     Physical Exam    Nursing note and vitals reviewed.  Constitutional:   Obese, postictal, chronically ill-appearing female   HENT:   Head: Normocephalic and atraumatic.   Eyes: EOM are normal. Pupils are equal, round, and reactive to light.   Cardiovascular: Normal rate, regular rhythm, normal heart sounds and intact distal pulses.   Pulmonary/Chest: Breath sounds normal. No respiratory distress. She has no wheezes. She has no rhonchi. She has no rales.   Abdominal: Soft. Bowel sounds are normal. She exhibits no distension. There is no tenderness. There is no rebound and no guarding.   Musculoskeletal: Normal range of motion. She exhibits no edema or tenderness.   Skin: Skin is warm and dry.   Psychiatric: She has a normal mood and affect.         ED Course   Procedures  Labs Reviewed   CBC W/ AUTO DIFFERENTIAL - Abnormal; Notable for the following:        Result Value    Hemoglobin 9.8 (*)     Hematocrit 31.8 (*)     MCV 79 (*)     MCH 24.4 (*)     MCHC 30.8 (*)     RDW 18.7 (*)     Platelets 387 (*)     All other components within normal limits   COMPREHENSIVE METABOLIC PANEL - Abnormal; Notable for the following:     CO2 22 (*)     Albumin 3.2 (*)     All other components within normal limits   MAGNESIUM   DRUG SCREEN PANEL, URINE EMERGENCY   PREGNANCY TEST, URINE RAPID   POCT URINE PREGNANCY            MEDICAL DECISION MAKING    This is an emergent  evaluation of the patient's symptoms.  Differential diagnoses includes: Epilepsy, intracranial hemorrhage, hyponatremia, drug intoxication. Room air pulse oximetry interpreted by me as normal. A decision was made to obtain the patient's old medical records.  If they were available, these records were reviewed.  Significant findings include prior evaluation for pseudotumor cerebri.  Additional history obtained from EMS and patient's family.  No leukocytosis or evidence of anemia.  Unremarkable metabolic panel.  She is not pregnant.  CT performed for further evaluation. No acute findings noted. This is an acute exacerbation of the patient's chronic seizure disorder. Recommend outpatient follow as needed.             Scribe Attestation:   Scribe #1: I performed the above scribed service and the documentation accurately describes the services I performed. I attest to the accuracy of the note.    Attending Attestation:           Physician Attestation for Scribe:  Physician Attestation Statement for Scribe #1: I, Flako Bright MD, reviewed documentation, as scribed by Deepa Garcia in my presence, and it is both accurate and complete.                 ED Course     Clinical Impression:   The encounter diagnosis was Seizure disorder.          Flako Bright MD  04/15/17 [id number]

## 2017-04-15 NOTE — ED AVS SNAPSHOT
[facility]  [address] [place] [address]               Yanni Kaiser   4/15/2017  2:17 PM   ED    Description:  Female : 1970   Department:  [facility]           Your Care was Coordinated By:     Provider Role From To    Flako Bright MD Attending Provider 04/15/17 1418 --      Reason for Visit     Seizures           Diagnoses this Visit        Comments    Seizure disorder    -  Primary       ED Disposition     None           To Do List           Follow-up Information     Follow up with Carlyle Gordillo MD. Call in 2 days.    Specialty:  Family Medicine    Contact information:    [address] [place] [address]  [phone]        [facility]     [facility] On Call Nurse Care Line -  Assistance  Unless otherwise directed by your provider, please contact [facility], our nurse care line that is available for  assistance.     Registered nurses in the [facility] provide: appointment scheduling, clinical advisement, health education, and other advisory services.  Call: [phone] (toll free)               Medications           Message regarding Medications     Verify the changes and/or additions to your medication regime listed below are the same as discussed with your clinician today.  If any of these changes or additions are incorrect, please notify your healthcare provider.             Verify that the below list of medications is an accurate representation of the medications you are currently taking.  If none reported, the list may be blank. If incorrect, please contact your healthcare provider. Carry this list with you in case of emergency.           Current Medications     amoxicillin-clavulanate 875-125mg (AUGMENTIN) 875-125 mg per tablet Take 1 tablet by mouth every 12 (twelve) hours.    atorvastatin (LIPITOR) 20 MG tablet Take 1 tablet (20 mg total) by mouth once daily.     butalbital-acetaminophen-caffeine -40 mg (FIORICET, ESGIC) -40 mg per tablet Take 1 tablet by mouth every 4 (four) hours as needed for Pain.    furosemide (LASIX) 20 MG tablet Take 1 tablet (20 mg total) by mouth once daily.    gabapentin (NEURONTIN) 300 MG capsule Take 2 capsules (600 mg total) by mouth every evening.    lamotrigine (LAMICTAL) 25 MG tablet Take 1 tablet (25 mg total) by mouth 2 (two) times daily.    montelukast (SINGULAIR) 10 mg tablet Take 1 tablet (10 mg total) by mouth once daily.    pantoprazole (PROTONIX) 40 MG tablet once daily.     primidone (MYSOLINE) 50 MG Tab Take 1 tablet (50 mg total) by mouth every evening.    topiramate (TOPAMAX) 200 MG Tab Take 1 tablet (200 mg total) by mouth 2 (two) times daily.    venlafaxine (EFFEXOR-XR) 75 MG 24 hr capsule Take 75 mg by mouth once daily.    acetaZOLAMIDE (DIAMOX) 500 mg CpSR Take 500 mg by mouth 2 (two) times daily.    albuterol (ACCUNEB) 1.25 mg/3 mL Nebu Take 3 mLs (1.25 mg total) by nebulization every 6 (six) hours as needed. Rescue    albuterol 90 mcg/actuation inhaler Inhale 2 puffs into the lungs every 6 (six) hours as needed for Wheezing. Rescue    albuterol-ipratropium 2.5mg-0.5mg/3mL (DUO-NEB) 0.5 mg-3 mg(2.5 mg base)/3 mL nebulizer solution Take 3 mLs by nebulization every 6 (six) hours as needed for Wheezing or Shortness of Breath. Rescue    blood sugar diagnostic Strp 1 each by Misc.(Non-Drug; Combo Route) route 4 (four) times daily before meals and nightly. ACCU CHEK ELVIA PLUS METER    blood-glucose meter Misc 1 each by Misc.(Non-Drug; Combo Route) route 4 (four) times daily before meals and nightly. ACCU CHEK ELVIA PLUS METER    capsaicin 0.1 % Crea Apply 1 application topically 2 (two) times daily.    EPIPEN 2-RHODA 0.3 mg/0.3 mL (1:1,000) AtIn     fluticasone (FLONASE) 50 mcg/actuation nasal spray 1 spray by Each Nare route daily as needed.    fluticasone-vilanterol (BREO ELLIPTA) 200-25 mcg/dose DsDv diskus inhaler  "Inhale 1 puff into the lungs once daily.    lancets (ACCU-CHEK SOFTCLIX LANCETS) Misc 1 Device by Misc.(Non-Drug; Combo Route) route 4 (four) times daily.    lancets 25 gauge Misc 1 lancet by Misc.(Non-Drug; Combo Route) route 4 (four) times daily before meals and nightly.    LINZESS 290 mcg Cap TK ONE CAPSULE PO D ON AN EMPTY STOMACH    losartan (COZAAR) 100 MG tablet Take 1 tablet (100 mg total) by mouth once daily.    metformin (GLUCOPHAGE-XR) 500 MG 24 hr tablet Take 2 tablets (1,000 mg total) by mouth 2 (two) times daily with meals.    nystatin-triamcinolone (MYCOLOG) ointment Apply topically 2 (two) times daily.    oxycodone-acetaminophen (PERCOCET)  mg per tablet Take 1 tablet by mouth every 4 (four) hours as needed for Pain.    TAZTIA  mg CpSR TK ONE CAPSULE PO D           Clinical Reference Information           Your Vitals Were     BP Pulse Temp Resp Height Weight    151/70 80 98.8 °F (37.1 °C) (Oral) 18 5' 4" (1.626 m) 160.1 kg (353 lb)    Last Period SpO2 BMI          (Exact Date) 98% 60.59 kg/m2        Allergies as of 4/15/2017     No Known Allergies      Immunizations Administered on Date of Encounter - 4/15/2017     None      ED Micro, Lab, POCT     Start Ordered       Status Ordering Provider    04/15/17 1422 04/15/17 1421  CBC W/ AUTO DIFFERENTIAL  STAT      Final result     04/15/17 1422 04/15/17 1421  Comprehensive metabolic panel  STAT      Final result     04/15/17 1422 04/15/17 1421  Magnesium  STAT      Final result     04/15/17 1422 04/15/17 1421  Drug screen panel, emergency  STAT      In process     04/15/17 1422 04/15/17 1421  Pregnancy, urine rapid  STAT      In process       ED Imaging Orders     Start Ordered       Status Ordering Provider    04/15/17 1422 04/15/17 1421  CT head without contrast  1 time imaging      In process         Discharge Instructions         Discharge Instructions for Epilepsy  You have been diagnosed with epilepsy, a disorder of recurring seizures. " When you have a seizure, an electrical disturbance happens in your brain. There are different kinds of seizures, and each patient may have one or many types of seizures. Here are some guidelines for you and your family.  If you have a seizure  Ask friends and family members to learn seizure management. Also, tell them to do the following if you have a seizure:  · Clear the area to prevent injury.  · Position you on a flat, carpeted surface, if possible.  · Dont try to restrain you.  · Dont put anything in your mouth.  · Turn you onto your side if you start to vomit.  · Keep track of the date and time the seizure started, how long it lasted, whether or not you lost consciousness, a description of your body movements, what provoked the seizure (if known), and any injuries you suffered. Using a watch may help keep correct time of events.    · Stay with you until you regain consciousness.  · Call 911 if the seizure is longer than 5 minutes, if there are multiple seizures, or if you do not begin to wake up after the seizure stops.    Activities  Following are some things to consider:  · Enjoy your normal activities. Most people with epilepsy lead normal lives.  · Avoid hazardous activities, such as mountain climbing or scuba diving. A seizure under these conditions could lead to a fatal accident.  · Do not swim alone or participate in other similar activities without others nearby.  · Ask your healthcare provider about any restrictions on driving or other activities.  · Check with your state department of public safety to learn whether there are any driving limitations based on your condition.  Other home care  Other considerations:  · Take your medicine exactly as directed. Skipping doses can affect the way your body handles the medicine, which could cause you to have a seizure.  · Dont drink alcohol or use any medicine without talking with your healthcare provider first.  · Seizure medicines may interact with other  medicines. Make sure all of your healthcare providers have a list of all your medicines.   · Birth control pills may not work as effectively when taking seizure medicines. Ask your healthcare provider if a change in birth control is needed.   · Wear a medical alert pendant or bracelet that alerts others to your condition, especially if you are allergic to seizure medicine.  · Join a local support group. Ask your healthcare provider for names and phone numbers.  .  When to seek medical attention  Tell your family members or friends to call 911 right away if you have:  · Seizure that lasts more than 5 minutes  · Multiple seizures in a row  · No recovery of consciousness after the seizure stops  Otherwise, have them call your healthcare provider right away if you have:  · Seizures that are getting longer and worse  · Seizures that are different from those youve had in the past  · Seizures strong enough to cause injury  · Skin rash  · Fever of 100.4°F (38°C) or higher, or as directed by your healthcare provider   Date Last Reviewed: 11/5/2015  © 4175-9100 [employer]. [address] [place] [address]. All rights reserved. This information is not intended as a substitute for professional medical care. Always follow your healthcare professional's instructions.          Your Scheduled Appointments     May 15, 2017  9:40 AM CDT   Neurology - Established Patient with MD Diego Lucas - Neurology ([facility] )    [address]  [facility] [address]   [phone]            Jun 05, 2017  1:45 PM CDT   Established Patient Visit with Rosalva Taylor DPM   [facility] - [facility]iatry ([facility])    [address] [facility] [place]  [place] [address] [phone]              [facility] Sign-Up     Activating your [facility] account is as easy as 1-2-3!     1) Visit [email or web address], select Sign Up Now, enter this activation code and your date of birth, then select  Next.  [email or web address]  Expires: 5/30/2017  4:38 PM      2) Create a username and password to use when you visit [facility] in the future and select a security question in case you lose your password and select Next.    3) Enter your e-mail address and click Sign Up!    Additional Information  If you have questions, please e-mail [email or web address] or call [phone] to talk to our [facility] staff. Remember, [facility] is NOT to be used for urgent needs. For medical emergencies, dial 911.         Smoking Cessation     If you would like to quit smoking:   You may be eligible for free services if you are a [place] resident and started smoking cigarettes before September 1, 1988.  Call the Smoking Cessation Trust (SCT) toll free at (930) 078-8368 or (448) 358-0311.   Call 1-800-QUIT-NOW if you do not meet the above criteria.   Contact us via email: [email or web address]   View our website for more information: [email or web address]         [facility] complies with applicable Federal civil rights laws and does not discriminate on the basis of race, color, national origin, age, disability, or sex.        Language Assistance Services     ATTENTION: Language assistance services are available, free of charge. Please call [phone].      ATENCIÓN: Si habla español, tiene a lee disposición servicios gratuitos de asistencia lingüística. Llame al [phone].     CHÚ Ý: N?u b?n nói Ti?ng Vi?t, có các d?ch v? h? tr? ngôn ng? mi?n phí dành cho b?n. G?i s? [phone].

## 2017-04-15 NOTE — ED TRIAGE NOTES
"Pt arrived to ED via EMS from home.  Pt family states she has had a headache for a week and had a "grand mal seizure" that involved slight shaking of her arm and loss of consciousness for about a minute.  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  Pt has Hx of seizures, DM, HLP, and HTN.  Pt was responsive to voice but would not answer questions in triage.   "

## 2017-04-19 ENCOUNTER — OFFICE VISIT (OUTPATIENT)
Dept: FAMILY MEDICINE | Facility: CLINIC | Age: 47
End: 2017-04-19
Payer: MEDICARE

## 2017-04-19 VITALS
TEMPERATURE: 98 F | SYSTOLIC BLOOD PRESSURE: 134 MMHG | BODY MASS INDEX: 50.02 KG/M2 | OXYGEN SATURATION: 96 % | HEIGHT: 64 IN | RESPIRATION RATE: 20 BRPM | HEART RATE: 67 BPM | WEIGHT: 293 LBS | DIASTOLIC BLOOD PRESSURE: 82 MMHG

## 2017-04-19 DIAGNOSIS — J32.1 CHRONIC FRONTAL SINUSITIS: Primary | ICD-10-CM

## 2017-04-19 DIAGNOSIS — T63.441A BEE STING, ACCIDENTAL OR UNINTENTIONAL, INITIAL ENCOUNTER: ICD-10-CM

## 2017-04-19 PROCEDURE — 1160F RVW MEDS BY RX/DR IN RCRD: CPT | Mod: S$GLB,,, | Performed by: FAMILY MEDICINE

## 2017-04-19 PROCEDURE — 99999 PR PBB SHADOW E&M-EST. PATIENT-LVL III: CPT | Mod: PBBFAC,,, | Performed by: FAMILY MEDICINE

## 2017-04-19 PROCEDURE — 99499 UNLISTED E&M SERVICE: CPT | Mod: S$GLB,,, | Performed by: FAMILY MEDICINE

## 2017-04-19 PROCEDURE — 3079F DIAST BP 80-89 MM HG: CPT | Mod: S$GLB,,, | Performed by: FAMILY MEDICINE

## 2017-04-19 PROCEDURE — 99214 OFFICE O/P EST MOD 30 MIN: CPT | Mod: S$GLB,,, | Performed by: FAMILY MEDICINE

## 2017-04-19 PROCEDURE — 3075F SYST BP GE 130 - 139MM HG: CPT | Mod: S$GLB,,, | Performed by: FAMILY MEDICINE

## 2017-04-19 RX ORDER — HYDROCORTISONE 25 MG/G
CREAM TOPICAL 2 TIMES DAILY
Qty: 20 G | Refills: 0 | Status: SHIPPED | OUTPATIENT
Start: 2017-04-19 | End: 2018-01-22

## 2017-04-19 RX ORDER — CEFDINIR 300 MG/1
300 CAPSULE ORAL 2 TIMES DAILY
Qty: 20 CAPSULE | Refills: 0 | Status: SHIPPED | OUTPATIENT
Start: 2017-04-19 | End: 2017-04-25

## 2017-04-19 RX ORDER — DESLORATADINE 5 MG/1
5 TABLET ORAL DAILY
Qty: 30 TABLET | Refills: 11 | Status: SHIPPED | OUTPATIENT
Start: 2017-04-19 | End: 2017-06-05

## 2017-04-19 NOTE — PROGRESS NOTES
Routine Office Visit    Patient Name: Yanni Kaiser    : 1970  MRN: [id number]    Subjective:  Yanni is a 46 y.o. female who presents today for:    1. Facial pain and headache  Patient presenting today with continued facial pain and congestion for the past 3-4 weeks.  She states that she is also having trouble with seasonal allergies and asthma.  She has been using her nebulizer machine more frequently.  She is followed by Dr. Harrell with pulmonary and states she just saw him.  She states that the congestion has worsened since she was last seen.  No fevers or chills.  No shortness of breath today.     Past Medical History  Past Medical History:   Diagnosis Date    Allergy     Asthma     Diabetes mellitus, type 2     GERD (gastroesophageal reflux disease)     High cholesterol     Hypertension     Pseudotumor cerebri     Seizures     Sleep apnea        Past Surgical History  Past Surgical History:   Procedure Laterality Date     SECTION      CHOLECYSTECTOMY      SINUS SURGERY         Family History  Family History   Problem Relation Age of Onset    Cancer Mother     Hypertension Mother     Diabetes Mother     Stroke Father     Heart disease Father     COPD Father     Heart attack Father     Cancer Maternal Grandmother        Social History  Social History     Social History    Marital status:      Spouse name: N/A    Number of children: N/A    Years of education: N/A     Occupational History    Not on file.     Social History Main Topics    Smoking status: Current Every Day Smoker     Packs/day: 1.00     Years: 15.00     Types: Cigarettes     Last attempt to quit: 6/10/2015    Smokeless tobacco: Never Used    Alcohol use No    Drug use: No    Sexual activity: Yes     Partners: Male     Birth control/ protection: None     Other Topics Concern    Not on file     Social History Narrative       Current Medications  Current Outpatient Prescriptions on File Prior to  Visit   Medication Sig Dispense Refill    acetaZOLAMIDE (DIAMOX) 500 mg CpSR Take 500 mg by mouth 2 (two) times daily.      albuterol (ACCUNEB) 1.25 mg/3 mL Nebu Take 3 mLs (1.25 mg total) by nebulization every 6 (six) hours as needed. Rescue 100 mL 0    albuterol 90 mcg/actuation inhaler Inhale 2 puffs into the lungs every 6 (six) hours as needed for Wheezing. Rescue 18 g 0    albuterol-ipratropium 2.5mg-0.5mg/3mL (DUO-NEB) 0.5 mg-3 mg(2.5 mg base)/3 mL nebulizer solution Take 3 mLs by nebulization every 6 (six) hours as needed for Wheezing or Shortness of Breath. Rescue 100 mL 0    atorvastatin (LIPITOR) 20 MG tablet Take 1 tablet (20 mg total) by mouth once daily. 90 tablet 1    blood sugar diagnostic Strp 1 each by Misc.(Non-Drug; Combo Route) route 4 (four) times daily before meals and nightly. ACCU CHEK ELVIA PLUS METER 200 each 3    blood-glucose meter Misc 1 each by Misc.(Non-Drug; Combo Route) route 4 (four) times daily before meals and nightly. ACCU CHEK ELVIA PLUS METER 1 each 0    butalbital-acetaminophen-caffeine -40 mg (FIORICET, ESGIC) -40 mg per tablet Take 1 tablet by mouth every 4 (four) hours as needed for Pain.      capsaicin 0.1 % Crea Apply 1 application topically 2 (two) times daily. 56.6 g 1    EPIPEN 2-RHODA 0.3 mg/0.3 mL (1:1,000) AtIn   1    fluticasone (FLONASE) 50 mcg/actuation nasal spray 1 spray by Each Nare route daily as needed. 16 g 1    fluticasone-vilanterol (BREO ELLIPTA) 200-25 mcg/dose DsDv diskus inhaler Inhale 1 puff into the lungs once daily. 1 each 3    gabapentin (NEURONTIN) 300 MG capsule Take 2 capsules (600 mg total) by mouth every evening. 180 capsule 11    lamotrigine (LAMICTAL) 25 MG tablet Take 1 tablet (25 mg total) by mouth 2 (two) times daily. 60 tablet 11    lancets (ACCU-CHEK SOFTCLIX LANCETS) Misc 1 Device by Misc.(Non-Drug; Combo Route) route 4 (four) times daily. 200 each 3    lancets 25 gauge Misc 1 lancet by Misc.(Non-Drug; Combo  Route) route 4 (four) times daily before meals and nightly. 200 each 11    LINZESS 290 mcg Cap TK ONE CAPSULE PO D ON AN EMPTY STOMACH  5    losartan (COZAAR) 100 MG tablet Take 1 tablet (100 mg total) by mouth once daily. 90 tablet 3    metformin (GLUCOPHAGE-XR) 500 MG 24 hr tablet Take 2 tablets (1,000 mg total) by mouth 2 (two) times daily with meals. 360 tablet 0    montelukast (SINGULAIR) 10 mg tablet Take 1 tablet (10 mg total) by mouth once daily. 30 tablet 5    oxycodone-acetaminophen (PERCOCET)  mg per tablet Take 1 tablet by mouth every 4 (four) hours as needed for Pain. 60 tablet 0    pantoprazole (PROTONIX) 40 MG tablet once daily.   0    primidone (MYSOLINE) 50 MG Tab Take 1 tablet (50 mg total) by mouth every evening. 90 tablet 3    TAZTIA  mg CpSR TK ONE CAPSULE PO D  2    topiramate (TOPAMAX) 200 MG Tab Take 1 tablet (200 mg total) by mouth 2 (two) times daily. 60 tablet 11    venlafaxine (EFFEXOR-XR) 75 MG 24 hr capsule Take 75 mg by mouth once daily.      [DISCONTINUED] amoxicillin-clavulanate 875-125mg (AUGMENTIN) 875-125 mg per tablet Take 1 tablet by mouth every 12 (twelve) hours.      furosemide (LASIX) 20 MG tablet Take 1 tablet (20 mg total) by mouth once daily. 5 tablet 0    nystatin-triamcinolone (MYCOLOG) ointment Apply topically 2 (two) times daily. 60 g 2     No current facility-administered medications on file prior to visit.        Allergies   Review of patient's allergies indicates:  No Known Allergies    Review of Systems (Pertinent positives)  Review of Systems   Constitutional: Positive for malaise/fatigue. Negative for fever.   HENT: Positive for congestion.    Eyes: Negative.    Respiratory: Negative.    Cardiovascular: Negative.    Gastrointestinal: Negative.    Genitourinary: Negative.    Musculoskeletal: Negative.    Skin: Negative.    Neurological: Positive for headaches.         /82 (BP Location: Right arm, Patient Position: Sitting, BP Method:  "Manual)  Pulse 67  Temp 98.3 °F (36.8 °C) (Oral)   Resp 20  Ht 5' 4" (1.626 m)  Wt (!) 162 kg (357 lb 2.3 oz)  LMP  (Exact Date)  SpO2 96%  BMI 61.3 kg/m2    GENERAL APPEARANCE: in no apparent distress and well developed and well nourished  HEENT: PERRL, EOMI, Sclera clear, anicteric, Oropharynx clear, no lesions, Neck supple with midline trachea; pain on palpation of frontal sinuses  NECK: normal, supple, no adenopathy, thyroid normal in size  RESPIRATORY: appears well, vitals normal, no respiratory distress, acyanotic, normal RR, chest clear, no wheezing, crepitations, rhonchi, normal symmetric air entry  HEART: regular rate and rhythm, S1, S2 normal, no murmur, click, rub or gallop.    ABDOMEN: abdomen is soft without tenderness, no masses, no hernias, no organomegaly, no rebound, no guarding. Suprapubic tenderness absent. No CVA tenderness.  SKIN: no rashes, no wounds, no other lesions (patient reports being stung by a bee in the lobby, but no evidence on exam)  PSYCH: Alert, oriented x 3, thought content appropriate, speech normal, pleasant and cooperative, good eye contact, well groomed    Assessment/Plan:  Yanni Kaiser is a 46 y.o. female who presents today for :    Yanni was seen today for eye pain, er follow-up and headache.    Diagnoses and all orders for this visit:    Chronic frontal sinusitis  -     cefdinir (OMNICEF) 300 MG capsule; Take 1 capsule (300 mg total) by mouth 2 (two) times daily.  -     desloratadine (CLARINEX) 5 mg tablet; Take 1 tablet (5 mg total) by mouth once daily.    Bee sting, accidental or unintentional, initial encounter  -     hydrocortisone 2.5 % cream; Apply topically 2 (two) times daily.      1.  Patient to take medications as prescribed  2. Hydrocortisone cream for possible bee sting  3.  Follow up in 3 months or sooner if needed  4.  Patient told to go to the ED should jeevan Gordillo MD    "

## 2017-04-19 NOTE — MR AVS SNAPSHOT
[facility]  [address]  [place] [address]  Phone: [phone]                  Yanni Kaiser   2017 8:15 AM   Office Visit    Description:  Female : 1970   Provider:  Carlyle Gordillo MD   Department:  [facility]           Reason for Visit     Eye Pain     ER Follow-up     Headache           Diagnoses this Visit        Comments    Chronic frontal sinusitis    -  Primary     Bee sting, accidental or unintentional, initial encounter                To Do List           Future Appointments        Provider Department Dept Phone    5/15/2017 9:40 AM Riccardo Moore MD [facility] - Neurology [phone]    2017 1:45 PM Rosalva Taylor DPM [facility] - Podiatry [phone]      Goals (5 Years of Data)              3/31/17    Patient/caregiver will accept life style changes to manage and improve asthma prior to discharge from OPCM.       Notes - Note created  4/10/2017 12:08 PM by Makenzie Delgado RN    Overall Time to Completion  2 months from 04/10/2017    Short Term Goals  Patient/caregiver will have contact information for identified community resources IE:Smoking Cessation Dept. for follow-up within 1 month.  Interventions   · Encourage Smoking Cessation.   · Mail information on smoking cessation program.    Status  · Partially met          Patient/caregiver will have an action plan in place to manage and prevent complications of diabetes prior to discharge from OPCM.   On track    Notes - Note edited  3/16/2017  2:17 PM by Makenzie Delgado RN    Overall Time to Completion  2 months from 2017    Short Term Goals  Patient/caregiver will measure and record the capillary blood glucose 4 times per day for 1 week.  Interventions   · Recognize and provide educational material (OMI).  · Assess for availability of working glucometer in home setting.   Status  · Partially met   · Met    Patient/caregiver will replace carbohydrate with fruit/vegetable  for 2 meals per day within 1 week.  Interventions   · Recognize and provide educational material (KRAMES).   Status  · Partially met   · Met    Patient/caregiver will verbalize 3 red flags of Hypoglycemia Hyperglycemia and know when to contact Physician within 2 weeks.  Interventions   · Recognize and provide educational material (KRAMES).   Status  · Partially met    Patient/caregiver will verbalize 3 signs and symptoms of Hypotension Hypertension within 2 weeks.   Interventions   · Recognize and provide educational material (KRAMES).   Status  · Partially met    Patient/caregiver will verbalize 3 ways of preventing complications due to disease process within 2 weeks.  Interventions   · Recognize and provide educational material (KRAMES).  · Encourage Dietary Compliance.  · Review eating/nutrition habits.  · Complete medication reconciliation.  · Encourage Medication Compliance.   Status  · Partially met          Patient/caregiver will have an action plan in place to manage and prevent complications of falls prior to discharge from OPCM.   On track    Notes - Note edited  3/16/2017  2:17 PM by Makenzie Delgado RN    Overall Time to Completion  2 months from 03/09/2017    Short Term Goals  Patient/caregiver will verbalize importance of having a safe home environment by keeping room free of clutter, making sure rooms are well lit and removing throw rugs within 1 week.  Interventions   · Recognize and provide educational material (KRAMES).   Status  · Partially met   · Met            Follow-Up and Disposition     Return if symptoms worsen or fail to improve.       These Medications        Disp Refills Start End    cefdinir (OMNICEF) 300 MG capsule 20 capsule 0 4/19/2017 4/29/2017    Take 1 capsule (300 mg total) by mouth 2 (two) times daily. - Oral    Pharmacy: [facility] [address] [place] [address] [address] AT [facility] Ph #: [phone]       hydrocortisone 2.5 % cream 20 g 0  4/19/2017 4/29/2017    Apply topically 2 (two) times daily. - Topical (Top)    Pharmacy: [facility] [address] AT [facility] Ph #: [phone]       desloratadine (CLARINEX) 5 mg tablet 30 tablet 11 4/19/2017 4/19/2018    Take 1 tablet (5 mg total) by mouth once daily. - Oral    Pharmacy: [facility] [address] AT [facility] Ph #: [phone]         [facility]     [facility] Nurse Care Line - 24/7 Assistance  Unless otherwise directed by your provider, please contact [facility], our nurse care line that is available for 24/7 assistance.     Registered nurses in the [facility] Center provide: appointment scheduling, clinical advisement, health education, and other advisory services.  Call: [phone] (toll free)               Medications           Message regarding Medications     Verify the changes and/or additions to your medication regime listed below are the same as discussed with your clinician today.  If any of these changes or additions are incorrect, please notify your healthcare provider.        START taking these NEW medications        Refills    cefdinir (OMNICEF) 300 MG capsule 0    Sig: Take 1 capsule (300 mg total) by mouth 2 (two) times daily.    Class: Normal    Route: Oral    hydrocortisone 2.5 % cream 0    Sig: Apply topically 2 (two) times daily.    Class: Normal    Route: Topical (Top)    desloratadine (CLARINEX) 5 mg tablet 11    Sig: Take 1 tablet (5 mg total) by mouth once daily.    Class: Normal    Route: Oral      STOP taking these medications     amoxicillin-clavulanate 875-125mg (AUGMENTIN) 875-125 mg per tablet Take 1 tablet by mouth every 12 (twelve) hours.           Verify that the below list of medications is an accurate representation of the medications you are currently taking.  If none reported, the list may be blank. If incorrect, please contact your  healthcare provider. Carry this list with you in case of emergency.           Current Medications     acetaZOLAMIDE (DIAMOX) 500 mg CpSR Take 500 mg by mouth 2 (two) times daily.    albuterol (ACCUNEB) 1.25 mg/3 mL Nebu Take 3 mLs (1.25 mg total) by nebulization every 6 (six) hours as needed. Rescue    albuterol 90 mcg/actuation inhaler Inhale 2 puffs into the lungs every 6 (six) hours as needed for Wheezing. Rescue    albuterol-ipratropium 2.5mg-0.5mg/3mL (DUO-NEB) 0.5 mg-3 mg(2.5 mg base)/3 mL nebulizer solution Take 3 mLs by nebulization every 6 (six) hours as needed for Wheezing or Shortness of Breath. Rescue    atorvastatin (LIPITOR) 20 MG tablet Take 1 tablet (20 mg total) by mouth once daily.    blood sugar diagnostic Strp 1 each by Misc.(Non-Drug; Combo Route) route 4 (four) times daily before meals and nightly. ACCU CHEK ELVIA PLUS METER    blood-glucose meter Misc 1 each by Misc.(Non-Drug; Combo Route) route 4 (four) times daily before meals and nightly. ACCU CHEK ELVIA PLUS METER    butalbital-acetaminophen-caffeine -40 mg (FIORICET, ESGIC) -40 mg per tablet Take 1 tablet by mouth every 4 (four) hours as needed for Pain.    capsaicin 0.1 % Crea Apply 1 application topically 2 (two) times daily.    EPIPEN 2-RHODA 0.3 mg/0.3 mL (1:1,000) AtIn     fluticasone (FLONASE) 50 mcg/actuation nasal spray 1 spray by Each Nare route daily as needed.    fluticasone-vilanterol (BREO ELLIPTA) 200-25 mcg/dose DsDv diskus inhaler Inhale 1 puff into the lungs once daily.    gabapentin (NEURONTIN) 300 MG capsule Take 2 capsules (600 mg total) by mouth every evening.    lamotrigine (LAMICTAL) 25 MG tablet Take 1 tablet (25 mg total) by mouth 2 (two) times daily.    lancets (ACCU-CHEK SOFTCLIX LANCETS) Misc 1 Device by Misc.(Non-Drug; Combo Route) route 4 (four) times daily.    lancets 25 gauge Misc 1 lancet by Misc.(Non-Drug; Combo Route) route 4 (four) times daily before meals and nightly.    LINZESS 290 mcg Cap  "TK ONE CAPSULE PO D ON AN EMPTY STOMACH    losartan (COZAAR) 100 MG tablet Take 1 tablet (100 mg total) by mouth once daily.    metformin (GLUCOPHAGE-XR) 500 MG 24 hr tablet Take 2 tablets (1,000 mg total) by mouth 2 (two) times daily with meals.    montelukast (SINGULAIR) 10 mg tablet Take 1 tablet (10 mg total) by mouth once daily.    oxycodone-acetaminophen (PERCOCET)  mg per tablet Take 1 tablet by mouth every 4 (four) hours as needed for Pain.    pantoprazole (PROTONIX) 40 MG tablet once daily.     primidone (MYSOLINE) 50 MG Tab Take 1 tablet (50 mg total) by mouth every evening.    TAZTIA  mg CpSR TK ONE CAPSULE PO D    topiramate (TOPAMAX) 200 MG Tab Take 1 tablet (200 mg total) by mouth 2 (two) times daily.    venlafaxine (EFFEXOR-XR) 75 MG 24 hr capsule Take 75 mg by mouth once daily.    cefdinir (OMNICEF) 300 MG capsule Take 1 capsule (300 mg total) by mouth 2 (two) times daily.    desloratadine (CLARINEX) 5 mg tablet Take 1 tablet (5 mg total) by mouth once daily.    furosemide (LASIX) 20 MG tablet Take 1 tablet (20 mg total) by mouth once daily.    hydrocortisone 2.5 % cream Apply topically 2 (two) times daily.    nystatin-triamcinolone (MYCOLOG) ointment Apply topically 2 (two) times daily.           Clinical Reference Information           Your Vitals Were     BP Pulse Temp Resp Height Weight    134/82 (BP Location: Right arm, Patient Position: Sitting, BP Method: Manual) 67 98.3 °F (36.8 °C) (Oral) 20 5' 4" (1.626 m) 162 kg (357 lb 2.3 oz)    Last Period SpO2 BMI          (Exact Date) 96% 61.3 kg/m2        Blood Pressure          Most Recent Value    BP  134/82      Allergies as of 4/19/2017     No Known Allergies      Immunizations Administered on Date of Encounter - 4/19/2017     None      [facility] Sign-Up     Activating your [facility] account is as easy as 1-2-3!     1) Visit [email or web address], select Sign Up Now, enter this activation code and your date of birth, then select " Next.  [email or web address]  Expires: 5/30/2017  4:38 PM      2) Create a username and password to use when you visit [facility] in the future and select a security question in case you lose your password and select Next.    3) Enter your e-mail address and click Sign Up!    Additional Information  If you have questions, please e-mail [email or web address] or call [phone] to talk to our [facility] staff. Remember, [facility] is NOT to be used for urgent needs. For medical emergencies, dial 911.         Smoking Cessation     If you would like to quit smoking:   You may be eligible for free services if you are a [place] resident and started smoking cigarettes before September 1, 1988.  Call the Smoking Cessation Trust (SCT) toll free at (421) 774-2091 or (229) 597-9080.   Call 1-800-QUIT-NOW if you do not meet the above criteria.   Contact us via email: [email or web address]   View our website for more information: [email or web address]        Language Assistance Services     ATTENTION: Language assistance services are available, free of charge. Please call [phone].      ATENCIÓN: Si habla español, tiene a lee disposición servicios gratuitos de asistencia lingüística. Llame al [phone].     CHÚ Ý: N?u b?n nói Ti?ng Vi?t, có các d?ch v? h? tr? ngôn ng? mi?n phí dành cho b?n. G?i s? [phone].         [facility] complies with applicable Federal civil rights laws and does not discriminate on the basis of race, color, national origin, age, disability, or sex.

## 2017-04-21 ENCOUNTER — TELEPHONE (OUTPATIENT)
Dept: FAMILY MEDICINE | Facility: CLINIC | Age: 47
End: 2017-04-21

## 2017-04-21 NOTE — TELEPHONE ENCOUNTER
----- Message from Elizabeth Grullon sent at 4/21/2017  7:47 AM CDT -----  Contact: self  Patient requesting a call back regarding forms for Social Security. Please contact her at [phone].    Thanks!

## 2017-04-25 ENCOUNTER — OFFICE VISIT (OUTPATIENT)
Dept: FAMILY MEDICINE | Facility: CLINIC | Age: 47
End: 2017-04-25
Payer: MEDICARE

## 2017-04-25 VITALS
SYSTOLIC BLOOD PRESSURE: 126 MMHG | HEIGHT: 64 IN | OXYGEN SATURATION: 97 % | TEMPERATURE: 99 F | BODY MASS INDEX: 50.02 KG/M2 | WEIGHT: 293 LBS | HEART RATE: 89 BPM | DIASTOLIC BLOOD PRESSURE: 80 MMHG

## 2017-04-25 DIAGNOSIS — Z23 IMMUNIZATION DUE: ICD-10-CM

## 2017-04-25 DIAGNOSIS — S90.811A ABRASION, RIGHT FOOT, INITIAL ENCOUNTER: ICD-10-CM

## 2017-04-25 DIAGNOSIS — J32.1 CHRONIC FRONTAL SINUSITIS: Primary | ICD-10-CM

## 2017-04-25 DIAGNOSIS — Z12.31 ENCOUNTER FOR SCREENING MAMMOGRAM FOR MALIGNANT NEOPLASM OF BREAST: ICD-10-CM

## 2017-04-25 DIAGNOSIS — R10.13 EPIGASTRIC PAIN: ICD-10-CM

## 2017-04-25 DIAGNOSIS — Z23 NEED FOR 23-POLYVALENT PNEUMOCOCCAL POLYSACCHARIDE VACCINE: ICD-10-CM

## 2017-04-25 DIAGNOSIS — Z12.39 BREAST CANCER SCREENING: ICD-10-CM

## 2017-04-25 PROCEDURE — 3079F DIAST BP 80-89 MM HG: CPT | Mod: S$GLB,,, | Performed by: FAMILY MEDICINE

## 2017-04-25 PROCEDURE — 99499 UNLISTED E&M SERVICE: CPT | Mod: S$GLB,,, | Performed by: FAMILY MEDICINE

## 2017-04-25 PROCEDURE — 99215 OFFICE O/P EST HI 40 MIN: CPT | Mod: S$GLB,,, | Performed by: FAMILY MEDICINE

## 2017-04-25 PROCEDURE — 90732 PPSV23 VACC 2 YRS+ SUBQ/IM: CPT | Mod: S$GLB,,, | Performed by: FAMILY MEDICINE

## 2017-04-25 PROCEDURE — 3074F SYST BP LT 130 MM HG: CPT | Mod: S$GLB,,, | Performed by: FAMILY MEDICINE

## 2017-04-25 PROCEDURE — G0009 ADMIN PNEUMOCOCCAL VACCINE: HCPCS | Mod: S$GLB,,, | Performed by: FAMILY MEDICINE

## 2017-04-25 PROCEDURE — 1160F RVW MEDS BY RX/DR IN RCRD: CPT | Mod: S$GLB,,, | Performed by: FAMILY MEDICINE

## 2017-04-25 PROCEDURE — 99999 PR PBB SHADOW E&M-EST. PATIENT-LVL IV: CPT | Mod: PBBFAC,,, | Performed by: FAMILY MEDICINE

## 2017-04-25 RX ORDER — MUPIROCIN 20 MG/G
OINTMENT TOPICAL 2 TIMES DAILY
Qty: 22 G | Refills: 0 | Status: SHIPPED | OUTPATIENT
Start: 2017-04-25 | End: 2017-05-08

## 2017-04-25 RX ORDER — LEVOFLOXACIN 750 MG/1
750 TABLET ORAL DAILY
Qty: 10 TABLET | Refills: 0 | Status: SHIPPED | OUTPATIENT
Start: 2017-04-25 | End: 2017-05-05

## 2017-04-25 NOTE — PROGRESS NOTES
Patient received Pneumococcal 23 vaccine and tolerated it well. Patient received VIS information. Advise to wait 15 min for any possible reactions.

## 2017-04-25 NOTE — MR AVS SNAPSHOT
[facility]  [address]  [place] [address]  Phone: [phone]                  Yanni Kaiser   2017 1:15 PM   Office Visit    Description:  Female : 1970   Provider:  Carlyle Gordillo MD   Department:  [facility]           Reason for Visit     Follow-up           Diagnoses this Visit        Comments    Chronic frontal sinusitis    -  Primary     Immunization due         Breast cancer screening         Encounter for screening mammogram for malignant neoplasm of breast         Epigastric pain                To Do List           Future Appointments        Provider Department Dept Phone    5/15/2017 9:40 AM Riccardo Moore MD [facility] - Neurology [phone]    2017 1:45 PM [facility] Taylor DPM [facility] Podiatry [phone]      Goals (5 Years of Data)              3/31/17    Patient/caregiver will accept life style changes to manage and improve asthma prior to discharge from OPCM.       Notes - Note created  4/10/2017 12:08 PM by Makenzie Delgado RN    Overall Time to Completion  2 months from 04/10/2017    Short Term Goals  Patient/caregiver will have contact information for identified community resources IE:Smoking Cessation Dept. for follow-up within 1 month.  Interventions   · Encourage Smoking Cessation.   · Mail information on smoking cessation program.    Status  · Partially met          Patient/caregiver will have an action plan in place to manage and prevent complications of diabetes prior to discharge from OPCM.   On track    Notes - Note edited  3/16/2017  2:17 PM by Makenzie Delgado RN    Overall Time to Completion  2 months from 2017    Short Term Goals  Patient/caregiver will measure and record the capillary blood glucose 4 times per day for 1 week.  Interventions   · Recognize and provide educational material (OMI).  · Assess for availability of working glucometer in home setting.   Status  · Partially met    · Met    Patient/caregiver will replace carbohydrate with fruit/vegetable for 2 meals per day within 1 week.  Interventions   · Recognize and provide educational material (KRAMES).   Status  · Partially met   · Met    Patient/caregiver will verbalize 3 red flags of Hypoglycemia Hyperglycemia and know when to contact Physician within 2 weeks.  Interventions   · Recognize and provide educational material (KRAMES).   Status  · Partially met    Patient/caregiver will verbalize 3 signs and symptoms of Hypotension Hypertension within 2 weeks.   Interventions   · Recognize and provide educational material (KRAMES).   Status  · Partially met    Patient/caregiver will verbalize 3 ways of preventing complications due to disease process within 2 weeks.  Interventions   · Recognize and provide educational material (KRAMES).  · Encourage Dietary Compliance.  · Review eating/nutrition habits.  · Complete medication reconciliation.  · Encourage Medication Compliance.   Status  · Partially met          Patient/caregiver will have an action plan in place to manage and prevent complications of falls prior to discharge from OPCM.   On track    Notes - Note edited  3/16/2017  2:17 PM by Makenzie Delgado RN    Overall Time to Completion  2 months from 03/09/2017    Short Term Goals  Patient/caregiver will verbalize importance of having a safe home environment by keeping room free of clutter, making sure rooms are well lit and removing throw rugs within 1 week.  Interventions   · Recognize and provide educational material (KRAMES).   Status  · Partially met   · Met            Follow-Up and Disposition     Return if symptoms worsen or fail to improve.       These Medications        Disp Refills Start End    levoFLOXacin (LEVAQUIN) 750 MG tablet 10 tablet 0 4/25/2017 5/5/2017    Take 1 tablet (750 mg total) by mouth once daily. - Oral    Pharmacy: [facility] [address] [place] [address] [address] AT [facility]   #: [phone]         [facility]     [facility] On Call Nurse Care Line - 24/7 Assistance  Unless otherwise directed by your provider, please contact [facility], our nurse care line that is available for 24/7 assistance.     Registered nurses in the [facility] provide: appointment scheduling, clinical advisement, health education, and other advisory services.  Call: [phone] (toll free)               Medications           Message regarding Medications     Verify the changes and/or additions to your medication regime listed below are the same as discussed with your clinician today.  If any of these changes or additions are incorrect, please notify your healthcare provider.        START taking these NEW medications        Refills    levoFLOXacin (LEVAQUIN) 750 MG tablet 0    Sig: Take 1 tablet (750 mg total) by mouth once daily.    Class: Normal    Route: Oral      STOP taking these medications     cefdinir (OMNICEF) 300 MG capsule Take 1 capsule (300 mg total) by mouth 2 (two) times daily.           Verify that the below list of medications is an accurate representation of the medications you are currently taking.  If none reported, the list may be blank. If incorrect, please contact your healthcare provider. Carry this list with you in case of emergency.           Current Medications     acetaZOLAMIDE (DIAMOX) 500 mg CpSR Take 500 mg by mouth 2 (two) times daily.    albuterol (ACCUNEB) 1.25 mg/3 mL Nebu Take 3 mLs (1.25 mg total) by nebulization every 6 (six) hours as needed. Rescue    albuterol 90 mcg/actuation inhaler Inhale 2 puffs into the lungs every 6 (six) hours as needed for Wheezing. Rescue    albuterol-ipratropium 2.5mg-0.5mg/3mL (DUO-NEB) 0.5 mg-3 mg(2.5 mg base)/3 mL nebulizer solution Take 3 mLs by nebulization every 6 (six) hours as needed for Wheezing or Shortness of Breath. Rescue    atorvastatin (LIPITOR) 20 MG tablet Take 1 tablet (20 mg total) by mouth once daily.     blood sugar diagnostic Strp 1 each by Misc.(Non-Drug; Combo Route) route 4 (four) times daily before meals and nightly. ACCU CHEK ELVIA PLUS METER    blood-glucose meter Misc 1 each by Misc.(Non-Drug; Combo Route) route 4 (four) times daily before meals and nightly. ACCU CHEK ELVIA PLUS METER    butalbital-acetaminophen-caffeine -40 mg (FIORICET, ESGIC) -40 mg per tablet Take 1 tablet by mouth every 4 (four) hours as needed for Pain.    capsaicin 0.1 % Crea Apply 1 application topically 2 (two) times daily.    desloratadine (CLARINEX) 5 mg tablet Take 1 tablet (5 mg total) by mouth once daily.    EPIPEN 2-RHODA 0.3 mg/0.3 mL (1:1,000) AtIn     fluticasone (FLONASE) 50 mcg/actuation nasal spray 1 spray by Each Nare route daily as needed.    fluticasone-vilanterol (BREO ELLIPTA) 200-25 mcg/dose DsDv diskus inhaler Inhale 1 puff into the lungs once daily.    gabapentin (NEURONTIN) 300 MG capsule Take 2 capsules (600 mg total) by mouth every evening.    hydrocortisone 2.5 % cream Apply topically 2 (two) times daily.    lamotrigine (LAMICTAL) 25 MG tablet Take 1 tablet (25 mg total) by mouth 2 (two) times daily.    lancets (ACCU-CHEK SOFTCLIX LANCETS) Misc 1 Device by Misc.(Non-Drug; Combo Route) route 4 (four) times daily.    lancets 25 gauge Misc 1 lancet by Misc.(Non-Drug; Combo Route) route 4 (four) times daily before meals and nightly.    LINZESS 290 mcg Cap TK ONE CAPSULE PO D ON AN EMPTY STOMACH    losartan (COZAAR) 100 MG tablet Take 1 tablet (100 mg total) by mouth once daily.    metformin (GLUCOPHAGE-XR) 500 MG 24 hr tablet Take 2 tablets (1,000 mg total) by mouth 2 (two) times daily with meals.    montelukast (SINGULAIR) 10 mg tablet Take 1 tablet (10 mg total) by mouth once daily.    oxycodone-acetaminophen (PERCOCET)  mg per tablet Take 1 tablet by mouth every 4 (four) hours as needed for Pain.    pantoprazole (PROTONIX) 40 MG tablet once daily.     primidone (MYSOLINE) 50 MG Tab Take 1  "tablet (50 mg total) by mouth every evening.    TAZTIA  mg CpSR TK ONE CAPSULE PO D    topiramate (TOPAMAX) 200 MG Tab Take 1 tablet (200 mg total) by mouth 2 (two) times daily.    venlafaxine (EFFEXOR-XR) 75 MG 24 hr capsule Take 75 mg by mouth once daily.    furosemide (LASIX) 20 MG tablet Take 1 tablet (20 mg total) by mouth once daily.    levoFLOXacin (LEVAQUIN) 750 MG tablet Take 1 tablet (750 mg total) by mouth once daily.    nystatin-triamcinolone (MYCOLOG) ointment Apply topically 2 (two) times daily.           Clinical Reference Information           Your Vitals Were     BP Pulse Temp Height Weight Last Period    126/80 89 99.3 °F (37.4 °C) (Oral) 5' 4" (1.626 m) 164.1 kg (361 lb 12.4 oz) (Exact Date)    SpO2 BMI             97% 62.1 kg/m2         Blood Pressure          Most Recent Value    BP  126/80      Allergies as of 4/25/2017     No Known Allergies      Immunizations Administered on Date of Encounter - 4/25/2017     Name Date Dose VIS Date Route    Pneumococcal Polysaccharide - 23 Valent  Incomplete 0.5 mL 4/24/2015 Intramuscular      Orders Placed During Today's Visit      Normal Orders This Visit    Ambulatory referral to ENT     Pneumococcal Polysaccharide Vaccine (23 Valent) (SQ/IM)     Future Labs/Procedures Expected by Expires    Lipase  4/25/2017 4/25/2018    Mammo Digital Screening Bilat with CAD  4/25/2017 6/25/2018      [facility] Sign-Up     Activating your [facility] account is as easy as 1-2-3!     1) Visit [email or web address], select Sign Up Now, enter this activation code and your date of birth, then select Next.  [email or web address]  Expires: 5/30/2017  4:38 PM      2) Create a username and password to use when you visit [facility] in the future and select a security question in case you lose your password and select Next.    3) Enter your e-mail address and click Sign Up!    Additional Information  If you have questions, please e-mail [email or web address] or call [phone] to talk to " our [facility] staff. Remember, [facility] is NOT to be used for urgent needs. For medical emergencies, dial 911.         Smoking Cessation     If you would like to quit smoking:   You may be eligible for free services if you are a [place] resident and started smoking cigarettes before September 1, 1988.  Call the Smoking Cessation Trust ([facility]) toll free at (929) 990-3695 or (554) 453-8484.   Call 1-800-QUIT-NOW if you do not meet the above criteria.   Contact us via email: [email or web address]   View our website for more information: [email or web address]        Language Assistance Services     ATTENTION: Language assistance services are available, free of charge. Please call [phone].      ATENCIÓN: Si merry kirsty, tiene a lee disposición servicios gratuitos de asistencia lingüística. Llame al [phone].     [facility] Ý: N?u b?n nói Ti?ng Vi?t, có các d?ch v? h? tr? ngôn ng? mi?n phí dành cho b?n. G?i s? [phone].         [facility] complies with applicable Federal civil rights laws and does not discriminate on the basis of race, color, national origin, age, disability, or sex.

## 2017-04-26 ENCOUNTER — HOSPITAL ENCOUNTER (OUTPATIENT)
Dept: RADIOLOGY | Facility: HOSPITAL | Age: 47
Discharge: HOME | End: 2017-04-26
Attending: FAMILY MEDICINE
Payer: MEDICARE

## 2017-04-26 DIAGNOSIS — Z12.31 ENCOUNTER FOR SCREENING MAMMOGRAM FOR MALIGNANT NEOPLASM OF BREAST: ICD-10-CM

## 2017-04-26 DIAGNOSIS — Z12.39 BREAST CANCER SCREENING: ICD-10-CM

## 2017-04-26 PROCEDURE — 77063 BREAST TOMOSYNTHESIS BI: CPT | Mod: 26,,, | Performed by: RADIOLOGY

## 2017-04-26 PROCEDURE — 77067 SCR MAMMO BI INCL CAD: CPT | Mod: TC

## 2017-04-26 PROCEDURE — 77067 SCR MAMMO BI INCL CAD: CPT | Mod: 26,,, | Performed by: RADIOLOGY

## 2017-04-26 NOTE — PROGRESS NOTES
"Routine Office Visit    Patient Name: Yanni Kaiser    : 1970  MRN: [id number]    Subjective:  Yanni is a 46 y.o. female who presents today for:    1. Sinuses  Patient presenting today with continued right frontal sinus pain.  She states that none of her medications have helped.  She did have a CT of the head done last month showing acute on chronic sinusitis and states that since then she has been having this pain.  She was started on Omnicef last week, but no improvement.  There has been no fevers, but she has had fatigue.  She does have a history of a sinus surgery done by Dr. Mendes several years ago, but she is not sure which procedure was done.  She states that it had helped out a lot until recently.     2.  Stomach pain  Patient presenting with several days of upper abdominal pain that is sharp.  She states that it is a sharp pain that is continuous.  It is worsened with movement and mildly improves at rest.  There has been no n/v/d.  There has been no blood in stool.  She denies any fevers or chills.  She does have a history of GERD, but states this feels different.  There has been no weight loss.    3.  Foot laceration  Patient states that she has a cut on the bottom of her right foot.  She states that the skin "just split".  The cut is painful when she walks stating that it stings a lot.  There has been no redness around it.  She is concerned due to her diabetes.  She denies any drainage from the wound.     Past Medical History  Past Medical History:   Diagnosis Date    Allergy     Asthma     Diabetes mellitus, type 2     GERD (gastroesophageal reflux disease)     High cholesterol     Hypertension     Pseudotumor cerebri     Seizures     Sleep apnea        Past Surgical History  Past Surgical History:   Procedure Laterality Date     SECTION      CHOLECYSTECTOMY      SINUS SURGERY         Family History  Family History   Problem Relation Age of Onset    Cancer Mother     " Hypertension Mother     Diabetes Mother     Stroke Father     Heart disease Father     COPD Father     Heart attack Father     Cancer Maternal Grandmother        Social History  Social History     Social History    Marital status:      Spouse name: N/A    Number of children: N/A    Years of education: N/A     Occupational History    Not on file.     Social History Main Topics    Smoking status: Current Every Day Smoker     Packs/day: 1.00     Years: 15.00     Types: Cigarettes     Last attempt to quit: 6/10/2015    Smokeless tobacco: Never Used    Alcohol use No    Drug use: No    Sexual activity: Yes     Partners: Male     Birth control/ protection: None     Other Topics Concern    Not on file     Social History Narrative     Medical, social, and surgical histories reviewed    Current Medications  Current Outpatient Prescriptions on File Prior to Visit   Medication Sig Dispense Refill    acetaZOLAMIDE (DIAMOX) 500 mg CpSR Take 500 mg by mouth 2 (two) times daily.      albuterol (ACCUNEB) 1.25 mg/3 mL Nebu Take 3 mLs (1.25 mg total) by nebulization every 6 (six) hours as needed. Rescue 100 mL 0    albuterol 90 mcg/actuation inhaler Inhale 2 puffs into the lungs every 6 (six) hours as needed for Wheezing. Rescue 18 g 0    albuterol-ipratropium 2.5mg-0.5mg/3mL (DUO-NEB) 0.5 mg-3 mg(2.5 mg base)/3 mL nebulizer solution Take 3 mLs by nebulization every 6 (six) hours as needed for Wheezing or Shortness of Breath. Rescue 100 mL 0    atorvastatin (LIPITOR) 20 MG tablet Take 1 tablet (20 mg total) by mouth once daily. 90 tablet 1    blood sugar diagnostic Strp 1 each by Misc.(Non-Drug; Combo Route) route 4 (four) times daily before meals and nightly. ACCU CHEK ELVIA PLUS METER 200 each 3    blood-glucose meter Misc 1 each by Misc.(Non-Drug; Combo Route) route 4 (four) times daily before meals and nightly. ACCU CHEK ELVIA PLUS METER 1 each 0    butalbital-acetaminophen-caffeine -40 mg  (FIORICET, ESGIC) -40 mg per tablet Take 1 tablet by mouth every 4 (four) hours as needed for Pain.      capsaicin 0.1 % Crea Apply 1 application topically 2 (two) times daily. 56.6 g 1    desloratadine (CLARINEX) 5 mg tablet Take 1 tablet (5 mg total) by mouth once daily. 30 tablet 11    EPIPEN 2-RHODA 0.3 mg/0.3 mL (1:1,000) AtIn   1    fluticasone (FLONASE) 50 mcg/actuation nasal spray 1 spray by Each Nare route daily as needed. 16 g 1    fluticasone-vilanterol (BREO ELLIPTA) 200-25 mcg/dose DsDv diskus inhaler Inhale 1 puff into the lungs once daily. 1 each 3    gabapentin (NEURONTIN) 300 MG capsule Take 2 capsules (600 mg total) by mouth every evening. 180 capsule 11    hydrocortisone 2.5 % cream Apply topically 2 (two) times daily. 20 g 0    lamotrigine (LAMICTAL) 25 MG tablet Take 1 tablet (25 mg total) by mouth 2 (two) times daily. 60 tablet 11    lancets (ACCU-CHEK SOFTCLIX LANCETS) Misc 1 Device by Misc.(Non-Drug; Combo Route) route 4 (four) times daily. 200 each 3    lancets 25 gauge Misc 1 lancet by Misc.(Non-Drug; Combo Route) route 4 (four) times daily before meals and nightly. 200 each 11    LINZESS 290 mcg Cap TK ONE CAPSULE PO D ON AN EMPTY STOMACH  5    losartan (COZAAR) 100 MG tablet Take 1 tablet (100 mg total) by mouth once daily. 90 tablet 3    metformin (GLUCOPHAGE-XR) 500 MG 24 hr tablet Take 2 tablets (1,000 mg total) by mouth 2 (two) times daily with meals. 360 tablet 0    montelukast (SINGULAIR) 10 mg tablet Take 1 tablet (10 mg total) by mouth once daily. 30 tablet 5    oxycodone-acetaminophen (PERCOCET)  mg per tablet Take 1 tablet by mouth every 4 (four) hours as needed for Pain. 60 tablet 0    pantoprazole (PROTONIX) 40 MG tablet once daily.   0    primidone (MYSOLINE) 50 MG Tab Take 1 tablet (50 mg total) by mouth every evening. 90 tablet 3    TAZTIA  mg CpSR TK ONE CAPSULE PO D  2    topiramate (TOPAMAX) 200 MG Tab Take 1 tablet (200 mg total) by  "mouth 2 (two) times daily. 60 tablet 11    venlafaxine (EFFEXOR-XR) 75 MG 24 hr capsule Take 75 mg by mouth once daily.      furosemide (LASIX) 20 MG tablet Take 1 tablet (20 mg total) by mouth once daily. 5 tablet 0    nystatin-triamcinolone (MYCOLOG) ointment Apply topically 2 (two) times daily. 60 g 2     No current facility-administered medications on file prior to visit.      Medications reviewed    Allergies   Review of patient's allergies indicates:  No Known Allergies    Review of Systems (Pertinent positives)  Review of Systems   Constitutional: Positive for malaise/fatigue.   HENT: Positive for congestion.    Respiratory: Positive for cough.    Cardiovascular: Negative.    Gastrointestinal: Positive for abdominal pain and nausea. Negative for blood in stool, constipation, diarrhea, melena and vomiting.   Genitourinary: Negative.    Musculoskeletal: Negative.    Skin:        +cut on right foot   Neurological: Positive for headaches. Negative for tingling, tremors, sensory change and speech change.         /80  Pulse 89  Temp 99.3 °F (37.4 °C) (Oral)   Ht 5' 4" (1.626 m)  Wt (!) 164.1 kg (361 lb 12.4 oz)  LMP  (Exact Date)  SpO2 97%  BMI 62.1 kg/m2    GENERAL APPEARANCE: in no apparent distress and well developed and well nourished  HEENT: PERRL, EOMI, Sclera clear, anicteric, Oropharynx clear, no lesions, Neck supple with midline trachea; pain on palpation of right frontal sinus  NECK: normal, supple, no adenopathy, thyroid normal in size  RESPIRATORY: appears well, vitals normal, no respiratory distress, acyanotic, normal RR, chest clear, no wheezing, crepitations, rhonchi, normal symmetric air entry  HEART: regular rate and rhythm, S1, S2 normal, no murmur, click, rub or gallop.    ABDOMEN: abdomen is soft with epigastric tenderness, no masses, no hernias, no organomegaly, no rebound, no guarding. Suprapubic tenderness absent. No CVA tenderness.  NEUROLOGIC: normal without focal findings, " CN II-XII are intact.   Extremities: warm/well perfused.  No abnormal hair patterns.  No clubbing, cyanosis 1+ edema bilaterally in lower extremities.  no muscular tenderness noted, full range of motion without pain.   SKIN: no rashes, no wounds, no other lesions  PSYCH: Alert, oriented x 3, thought content appropriate, speech normal, pleasant and cooperative, good eye contact, well groomed      Assessment/Plan:  Yanni Kaiser is a 46 y.o. female who presents today for :    Yanni was seen today for follow-up.    Diagnoses and all orders for this visit:    Chronic frontal sinusitis  -     Ambulatory referral to ENT  -     levoFLOXacin (LEVAQUIN) 750 MG tablet; Take 1 tablet (750 mg total) by mouth once daily.    Immunization due  -     Pneumococcal Polysaccharide Vaccine (23 Valent) (SQ/IM)    Breast cancer screening  -     Mammo Digital Screening Bilat with Tomosynthesis_CAD; Future    Encounter for screening mammogram for malignant neoplasm of breast   -     Mammo Digital Screening Bilat with Tomosynthesis_CAD; Future    Epigastric pain  -     Lipase; Future    Abrasion, right foot, initial encounter  -     mupirocin (BACTROBAN) 2 % ointment; Apply topically 2 (two) times daily.    Need for 23-polyvalent pneumococcal polysaccharide vaccine      1.  Stop omnicef and start levaquin as patients symptoms have not improved  2.  Refer to ENT for chronic sinusitis eval  3.  Mammogram ordered as she is due  4.  Due to persistent epigastric pain will get lipase done  5.  For abrasion on foot, will give bactroban to be used twice daily  6.  Patient to follow up as needed  7.  VSS  8.  Patient due for mammogram, so will order that today  9.  With her smoking history and dx of asthma i recommended strongly for pneumovax and she will get that done    Carlyle Gordillo MD

## 2017-04-27 ENCOUNTER — TELEPHONE (OUTPATIENT)
Dept: FAMILY MEDICINE | Facility: CLINIC | Age: 47
End: 2017-04-27

## 2017-04-27 NOTE — TELEPHONE ENCOUNTER
----- Message from Carlyle Gordillo MD sent at 4/27/2017  2:41 PM CDT -----  Please let patient know that mammogram was negative.    Thanks,  Dr. Gordillo

## 2017-05-01 ENCOUNTER — OFFICE VISIT (OUTPATIENT)
Dept: PODIATRY | Facility: CLINIC | Age: 47
End: 2017-05-01
Payer: MEDICARE

## 2017-05-01 VITALS — BODY MASS INDEX: 50.02 KG/M2 | WEIGHT: 293 LBS | HEIGHT: 64 IN

## 2017-05-01 DIAGNOSIS — R23.4 FISSURE IN SKIN OF FOOT: ICD-10-CM

## 2017-05-01 DIAGNOSIS — R20.2 PARESTHESIAS: ICD-10-CM

## 2017-05-01 DIAGNOSIS — M21.40 PES PLANUS, UNSPECIFIED LATERALITY: ICD-10-CM

## 2017-05-01 DIAGNOSIS — M79.672 HEEL PAIN, BILATERAL: ICD-10-CM

## 2017-05-01 DIAGNOSIS — R60.0 BILATERAL LOWER EXTREMITY EDEMA: ICD-10-CM

## 2017-05-01 DIAGNOSIS — M79.671 HEEL PAIN, BILATERAL: ICD-10-CM

## 2017-05-01 PROCEDURE — 3046F HEMOGLOBIN A1C LEVEL >9.0%: CPT | Mod: S$GLB,,, | Performed by: PODIATRIST

## 2017-05-01 PROCEDURE — 99499 UNLISTED E&M SERVICE: CPT | Mod: S$GLB,,, | Performed by: PODIATRIST

## 2017-05-01 PROCEDURE — 99999 PR PBB SHADOW E&M-EST. PATIENT-LVL II: CPT | Mod: PBBFAC,,, | Performed by: PODIATRIST

## 2017-05-01 PROCEDURE — 4010F ACE/ARB THERAPY RXD/TAKEN: CPT | Mod: S$GLB,,, | Performed by: PODIATRIST

## 2017-05-01 PROCEDURE — 1160F RVW MEDS BY RX/DR IN RCRD: CPT | Mod: S$GLB,,, | Performed by: PODIATRIST

## 2017-05-01 PROCEDURE — 99213 OFFICE O/P EST LOW 20 MIN: CPT | Mod: S$GLB,,, | Performed by: PODIATRIST

## 2017-05-01 RX ORDER — INSULIN ASPART 100 [IU]/ML
INJECTION, SOLUTION INTRAVENOUS; SUBCUTANEOUS
Refills: 11 | COMMUNITY
Start: 2017-04-25 | End: 2017-05-08

## 2017-05-01 RX ORDER — TRAMADOL HYDROCHLORIDE 50 MG/1
50 TABLET ORAL EVERY 6 HOURS PRN
Qty: 40 TABLET | Refills: 0 | Status: SHIPPED | OUTPATIENT
Start: 2017-05-01 | End: 2017-05-31

## 2017-05-01 NOTE — PROGRESS NOTES
Subjective:      Patient ID: Yanni Kaiser is a 46 y.o. female.    Chief Complaint: Foot Pain (bilateral arch and ankle pain/burning/ sticking pain Pcp Dr. Gordillo 04/25/2017); Ankle Pain; and Heel Pain    Yanni is a 46 y.o. female who RTC for follow up of bilateral foot and heel pain. Pain has improved slightly with icing, voltaren gel however she only uses this occasionally. Only thing that helps improve pain is Roxicodone. Also has small fissure/cut on the R heel which hurts. Present for 2 weeks. No other complaints today. Previous heel injection (R) helped some but now the area is worse pain than before the injection.      This patient has documented high risk feet requiring routine maintenance secondary to diabetes mellitis and those secondary complications of diabetes, as mentioned..    PCP: Carlyle Gordillo MD    Date Last Seen by PCP:   Chief Complaint   Patient presents with    Foot Pain     bilateral arch and ankle pain/burning/ sticking pain Pcp Dr. Gordillo 04/25/2017    Ankle Pain    Heel Pain     Current shoe gear:  Tennis shoes    Hemoglobin A1C   Date Value Ref Range Status   02/21/2017 10.3 (H) 4.5 - 6.2 % Final     Comment:     According to ADA guidelines, hemoglobin A1C <7.0% represents  optimal control in non-pregnant diabetic patients.  Different  metrics may apply to specific populations.   Standards of Medical Care in Diabetes - 2016.  For the purpose of screening for the presence of diabetes:  <5.7%     Consistent with the absence of diabetes  5.7-6.4%  Consistent with increasing risk for diabetes   (prediabetes)  >or=6.5%  Consistent with diabetes  Currently no consensus exists for use of hemoglobin A1C  for diagnosis of diabetes for children.     01/11/2017 6.7 (H) 4.5 - 6.2 % Final     Comment:     According to ADA guidelines, hemoglobin A1C <7.0% represents  optimal control in non-pregnant diabetic patients.  Different  metrics may apply to specific populations.   Standards of  Medical Care in Diabetes - 2016.  For the purpose of screening for the presence of diabetes:  <5.7%     Consistent with the absence of diabetes  5.7-6.4%  Consistent with increasing risk for diabetes   (prediabetes)  >or=6.5%  Consistent with diabetes  Currently no consensus exists for use of hemoglobin A1C  for diagnosis of diabetes for children.     10/17/2016 6.6 (H) 4.5 - 6.2 % Final     Comment:     According to ADA guidelines, hemoglobin A1C <7.0% represents  optimal control in non-pregnant diabetic patients.  Different  metrics may apply to specific populations.   Standards of Medical Care in Diabetes - 2016.  For the purpose of screening for the presence of diabetes:  <5.7%     Consistent with the absence of diabetes  5.7-6.4%  Consistent with increasing risk for diabetes   (prediabetes)  >or=6.5%  Consistent with diabetes  Currently no consensus exists for use of hemoglobin A1C  for diagnosis of diabetes for children.       Patient Active Problem List   Diagnosis    Body mass index 60.0-69.9, adult    Mild intermittent asthma    SHARATH (obstructive sleep apnea)    Leg varices    Low back pain    Seizure disorder    Type 2 diabetes mellitus, controlled    Migraine    Morbid obesity with BMI of 60.0-69.9, adult    Tobacco abuse    Anemia of chronic disease    Mild protein malnutrition    Weakness    Allergic rhinitis    Idiopathic intracranial hypertension    Essential hypertension    Combined hyperlipidemia associated with type 2 diabetes mellitus    Depression    Seizure    Hyperlipidemia    GERD (gastroesophageal reflux disease)    Epilepsy    Plantar fasciitis, bilateral    Papilledema associated with increased intracranial pressure    DUSTIN (acute kidney injury)    Numbness of right hand    Muscle tightness    Muscle weakness    Type 2 diabetes mellitus with stage 3 chronic kidney disease, without long-term current use of insulin     Current Outpatient Prescriptions on File Prior  to Visit   Medication Sig Dispense Refill    acetaZOLAMIDE (DIAMOX) 500 mg CpSR Take 500 mg by mouth 2 (two) times daily.      albuterol (ACCUNEB) 1.25 mg/3 mL Nebu Take 3 mLs (1.25 mg total) by nebulization every 6 (six) hours as needed. Rescue 100 mL 0    albuterol 90 mcg/actuation inhaler Inhale 2 puffs into the lungs every 6 (six) hours as needed for Wheezing. Rescue 18 g 0    albuterol-ipratropium 2.5mg-0.5mg/3mL (DUO-NEB) 0.5 mg-3 mg(2.5 mg base)/3 mL nebulizer solution Take 3 mLs by nebulization every 6 (six) hours as needed for Wheezing or Shortness of Breath. Rescue 100 mL 0    atorvastatin (LIPITOR) 20 MG tablet Take 1 tablet (20 mg total) by mouth once daily. 90 tablet 1    blood sugar diagnostic Strp 1 each by Misc.(Non-Drug; Combo Route) route 4 (four) times daily before meals and nightly. ACCU CHEK ELVIA PLUS METER 200 each 3    blood-glucose meter Misc 1 each by Misc.(Non-Drug; Combo Route) route 4 (four) times daily before meals and nightly. ACCU CHEK ELVIA PLUS METER 1 each 0    butalbital-acetaminophen-caffeine -40 mg (FIORICET, ESGIC) -40 mg per tablet Take 1 tablet by mouth every 4 (four) hours as needed for Pain.      capsaicin 0.1 % Crea Apply 1 application topically 2 (two) times daily. 56.6 g 1    desloratadine (CLARINEX) 5 mg tablet Take 1 tablet (5 mg total) by mouth once daily. 30 tablet 11    EPIPEN 2-RHODA 0.3 mg/0.3 mL (1:1,000) AtIn   1    fluticasone (FLONASE) 50 mcg/actuation nasal spray 1 spray by Each Nare route daily as needed. 16 g 1    fluticasone-vilanterol (BREO ELLIPTA) 200-25 mcg/dose DsDv diskus inhaler Inhale 1 puff into the lungs once daily. 1 each 3    gabapentin (NEURONTIN) 300 MG capsule Take 2 capsules (600 mg total) by mouth every evening. 180 capsule 11    lamotrigine (LAMICTAL) 25 MG tablet Take 1 tablet (25 mg total) by mouth 2 (two) times daily. 60 tablet 11    lancets (ACCU-CHEK SOFTCLIX LANCETS) Misc 1 Device by Misc.(Non-Drug; Combo  Route) route 4 (four) times daily. 200 each 3    lancets 25 gauge Misc 1 lancet by Misc.(Non-Drug; Combo Route) route 4 (four) times daily before meals and nightly. 200 each 11    levoFLOXacin (LEVAQUIN) 750 MG tablet Take 1 tablet (750 mg total) by mouth once daily. 10 tablet 0    LINZESS 290 mcg Cap TK ONE CAPSULE PO D ON AN EMPTY STOMACH  5    losartan (COZAAR) 100 MG tablet Take 1 tablet (100 mg total) by mouth once daily. 90 tablet 3    metformin (GLUCOPHAGE-XR) 500 MG 24 hr tablet Take 2 tablets (1,000 mg total) by mouth 2 (two) times daily with meals. 360 tablet 0    montelukast (SINGULAIR) 10 mg tablet Take 1 tablet (10 mg total) by mouth once daily. 30 tablet 5    mupirocin (BACTROBAN) 2 % ointment Apply topically 2 (two) times daily. 22 g 0    oxycodone-acetaminophen (PERCOCET)  mg per tablet Take 1 tablet by mouth every 4 (four) hours as needed for Pain. 60 tablet 0    pantoprazole (PROTONIX) 40 MG tablet once daily.   0    primidone (MYSOLINE) 50 MG Tab Take 1 tablet (50 mg total) by mouth every evening. 90 tablet 3    TAZTIA  mg CpSR TK ONE CAPSULE PO D  2    topiramate (TOPAMAX) 200 MG Tab Take 1 tablet (200 mg total) by mouth 2 (two) times daily. 60 tablet 11    venlafaxine (EFFEXOR-XR) 75 MG 24 hr capsule Take 75 mg by mouth once daily.      furosemide (LASIX) 20 MG tablet Take 1 tablet (20 mg total) by mouth once daily. 5 tablet 0    hydrocortisone 2.5 % cream Apply topically 2 (two) times daily. 20 g 0    nystatin-triamcinolone (MYCOLOG) ointment Apply topically 2 (two) times daily. 60 g 2     No current facility-administered medications on file prior to visit.      Review of patient's allergies indicates:  No Known Allergies  Past Surgical History:   Procedure Laterality Date     SECTION      CHOLECYSTECTOMY      SINUS SURGERY       Family History   Problem Relation Age of Onset    Cancer Mother     Hypertension Mother     Diabetes Mother     Stroke  "Father     Heart disease Father     COPD Father     Heart attack Father     Cancer Maternal Grandmother      Social History     Social History    Marital status:      Spouse name: N/A    Number of children: N/A    Years of education: N/A     Occupational History    Not on file.     Social History Main Topics    Smoking status: Current Every Day Smoker     Packs/day: 1.00     Years: 15.00     Types: Cigarettes     Last attempt to quit: 6/10/2015    Smokeless tobacco: Never Used    Alcohol use No    Drug use: No    Sexual activity: Yes     Partners: Male     Birth control/ protection: None     Other Topics Concern    Not on file     Social History Narrative        Review of Systems   Constitution: Negative for chills, fever and weakness.   Cardiovascular: Positive for leg swelling. Negative for chest pain and claudication.   Respiratory: Negative for cough and shortness of breath.    Skin: Positive for dry skin and nail changes. Negative for itching and rash.   Musculoskeletal: Positive for arthritis, joint pain and myalgias. Negative for falls, joint swelling and muscle weakness.   Gastrointestinal: Negative for diarrhea, nausea and vomiting.   Neurological: Positive for numbness and paresthesias. Negative for tremors.   Psychiatric/Behavioral: Negative for altered mental status and hallucinations.           Objective:       Vitals:    05/01/17 1533   Weight: (!) 163.7 kg (361 lb)   Height: 5' 4" (1.626 m)   PainSc: 10-Worst pain ever   PainLoc: Foot       Physical Exam   Constitutional:   General: Pt. is well-developed, well-nourished, appears stated age, in no acute distress, alert and oriented x 3. No evidence of depression, anxiety, or agitation. Calm, cooperative, and communicative. Appropriate interactions and affect.       Cardiovascular:   Pulses:       Dorsalis pedis pulses are 1+ on the right side, and 1+ on the left side.        Posterior tibial pulses are 1+ on the right side, and 1+ " on the left side.   Dorsalis pedis and posterior tibial pulses are diminished Bilaterally. Toes are cool to touch. Feet are warm proximally.There is decreased digital hair. Skin is atrophic, hyperpigmented, and mildly edematous       Musculoskeletal:        Right ankle: She exhibits decreased range of motion and swelling. No tenderness. No lateral malleolus, no medial malleolus, no CF ligament, no head of 5th metatarsal and no proximal fibula tenderness found. Achilles tendon exhibits no pain and no defect.        Left ankle: She exhibits decreased range of motion and swelling. No tenderness. No lateral malleolus, no medial malleolus, no head of 5th metatarsal and no proximal fibula tenderness found. Achilles tendon exhibits no pain and no defect.        Right foot: There is tenderness (Plantar medial calcaneal tuber. mild pain to palpation of 5th met hase and resistance to eversion). There is normal range of motion.        Left foot: There is tenderness (plantar calcaneal medial tuber. mild pain with palpation of 5th met base and eversion resistance. ). There is normal range of motion.   Biomechanical exam: Pain on palpation bilateral medial calcaneal tubercle at origin of plantar fascia. There is equinus deformity bilateral with decreased dorsiflexion at the ankle joint bilateral. No tenderness with compression of heel. Negative tinels sign. Gait analysis reveals excessive pronation through midstance and propulsion with early heel off. Shoes reveals lateral heel counter wear bilateral     Muscle strength is 5/5 in all groups bilaterally.    Decreased stride, station of gait.  apropulsive toe off.  Increased angle and base of gait.    Patient has hammertoes of digits 2-5 bilateral partially reducible without symptom today.    Decreased first MPJ range of motion both weightbearing and nonweightbearing, no crepitus observed the first MP joint,+ dorsal flag sign. Mild  bunion deformity is observed .    Decreased arch  height noted b/l.    Neurological: A sensory deficit is present.   Petal-Arjun 5.07 monofilamant testing is diminished Maciej feet. Sharp/dull sensation diminished Bilaterally.     Paresthesias, and hyperesthesia bilateral feet with no clearly identified trigger or source.     Skin: Skin is warm, dry and intact. No abrasion, no ecchymosis, no lesion and no rash noted. She is not diaphoretic. No cyanosis or erythema. No pallor. Nails show no clubbing.   Toenails 1-5 bilaterally neatly trimmed and painted thickened by 2-3 mm,     Interdigital Spaces clean, dry and without evidence of break in skin integrity    Small < 1cm fissure noted to plantar medial R heel. Stable without signs of infection. No erythema or drainage noted.    Psychiatric: She has a normal mood and affect. Her speech is normal.   Nursing note and vitals reviewed.            Assessment:       Encounter Diagnoses   Name Primary?    Uncontrolled type 2 diabetes with neuropathy Yes    Heel pain, bilateral     Pes planus, unspecified laterality     Fissure in skin of foot - Right Foot     Bilateral lower extremity edema     Paresthesias          Plan:       Yanni was seen today for foot pain, ankle pain and heel pain.    Diagnoses and all orders for this visit:    Uncontrolled type 2 diabetes with neuropathy    Heel pain, bilateral    Pes planus, unspecified laterality    Fissure in skin of foot - Right Foot    Bilateral lower extremity edema    Paresthesias    Other orders  -     tramadol (ULTRAM) 50 mg tablet; Take 1 tablet (50 mg total) by mouth every 6 (six) hours as needed for Pain.      I counseled the patient on her conditions, their implications and medical management.     - Shoe inspection. Diabetic Foot Education. Patient reminded of the importance of good nutrition and blood sugar control to help prevent podiatric complications of diabetes. Patient instructed on proper foot hygeine. We discussed wearing proper shoe gear, daily foot  inspections, never walking without protective shoe gear, never putting sharp instruments to feet. A1c moderately elevated. Discuss with PCP.     Discussed different treatment options for foot pain. I gave written and verbal instructions on stretching exercises. Patient expressed understanding. Discussed icing the affected area as needed and also wearing appropriate shoe gear and avoiding flats, slippers, sandals, and going barefoot. My recommendation for OTC supports is [employer]. Patient instructed on adequate icing techniques. Patient should ice the affected area at least once per day x 10 minutes for 10 days . I advised the patient that extra icing would also be beneficial to ensure adequate anti inflammatory effect. We also discussed cortisone injections and NSAID therapy. She would like to avoid injection today.     Rx tramadol 50mg q6-8h for pain.  Advised to take as directed only when pain is severe. I reminded patient that this will not be refilled for long term treatment. If this type of pain medication is required for heel pain, she will be referred to PT for further treatment. She verbalized understanding.     RTC in 8-10 weeks if no improvement

## 2017-05-01 NOTE — MR AVS SNAPSHOT
[facility]  [address] [facility]  [place] [address]  Phone: [phone]                  Yanni Kaiser   2017 3:15 PM   Office Visit    Description:  Female : 1970   Provider:  Vladimir Fay DPM   Department:  [facility]           Reason for Visit     Foot Pain     Ankle Pain     Heel Pain           Diagnoses this Visit        Comments    Uncontrolled type 2 diabetes with neuropathy    -  Primary     Heel pain, bilateral         Pes planus, unspecified laterality                To Do List           Future Appointments        Provider Department Dept Phone    5/15/2017 9:40 AM Riccardo Moore MD [facility] [phone]    2017 1:45 PM Rosalva Taylor DPM [facility] [phone]    7/3/2017 2:15 PM Rosalva Taylor DPM [facility] [phone]      Goals (5 Years of Data)              3/31/17    Patient/caregiver will accept life style changes to manage and improve asthma prior to discharge from [facility].       Notes - Note created  4/10/2017 12:08 PM by Makenzie Delgado RN    Overall Time to Completion  2 months from 04/10/2017    Short Term Goals  Patient/caregiver will have contact information for identified community resources IE:Smoking Cessation Dept. for follow-up within 1 month.  Interventions   · Encourage Smoking Cessation.   · Mail information on smoking cessation program.    Status  · Partially met          Patient/caregiver will have an action plan in place to manage and prevent complications of diabetes prior to discharge from [facility].   On track    Notes - Note edited  3/16/2017  2:17 PM by Makenzie Delgado RN    Overall Time to Completion  2 months from 2017    Short Term Goals  Patient/caregiver will measure and record the capillary blood glucose 4 times per day for 1 week.  Interventions   · Recognize and provide educational material (OMI).  · Assess for availability of working glucometer in home setting.   Status  · Partially met    · Met    Patient/caregiver will replace carbohydrate with fruit/vegetable for 2 meals per day within 1 week.  Interventions   · Recognize and provide educational material (KRAMES).   Status  · Partially met   · Met    Patient/caregiver will verbalize 3 red flags of Hypoglycemia Hyperglycemia and know when to contact Physician within 2 weeks.  Interventions   · Recognize and provide educational material (KRAMES).   Status  · Partially met    Patient/caregiver will verbalize 3 signs and symptoms of Hypotension Hypertension within 2 weeks.   Interventions   · Recognize and provide educational material (KRAMES).   Status  · Partially met    Patient/caregiver will verbalize 3 ways of preventing complications due to disease process within 2 weeks.  Interventions   · Recognize and provide educational material (KRAMES).  · Encourage Dietary Compliance.  · Review eating/nutrition habits.  · Complete medication reconciliation.  · Encourage Medication Compliance.   Status  · Partially met          Patient/caregiver will have an action plan in place to manage and prevent complications of falls prior to discharge from [facility].   On track    Notes - Note edited  3/16/2017  2:17 PM by Makenzie Delgado RN    Overall Time to Completion  2 months from 03/09/2017    Short Term Goals  Patient/caregiver will verbalize importance of having a safe home environment by keeping room free of clutter, making sure rooms are well lit and removing throw rugs within 1 week.  Interventions   · Recognize and provide educational material (TRINIDADMES).   Status  · Partially met   · Met             These Medications        Disp Refills Start End    tramadol (ULTRAM) 50 mg tablet 40 tablet 0 5/1/2017 5/31/2017    Take 1 tablet (50 mg total) by mouth every 6 (six) hours as needed for Pain. - Oral    Pharmacy: [facility] [address] [place] [address] EXPY AT [facility] Ph #: [phone]         [facility]     [facility] Nurse Care [facility] - 24/7 Assistance  Unless otherwise directed by your provider, please contact [facility], our nurse care line that is available for 24/7 assistance.     Registered nurses in the [facility] provide: appointment scheduling, clinical advisement, health education, and other advisory services.  Call: [phone] (toll free)               Medications           Message regarding Medications     Verify the changes and/or additions to your medication regime listed below are the same as discussed with your clinician today.  If any of these changes or additions are incorrect, please notify your healthcare provider.        START taking these NEW medications        Refills    tramadol (ULTRAM) 50 mg tablet 0    Sig: Take 1 tablet (50 mg total) by mouth every 6 (six) hours as needed for Pain.    Class: Print    Route: Oral           Verify that the below list of medications is an accurate representation of the medications you are currently taking.  If none reported, the list may be blank. If incorrect, please contact your healthcare provider. Carry this list with you in case of emergency.           Current Medications     acetaZOLAMIDE (DIAMOX) 500 mg CpSR Take 500 mg by mouth 2 (two) times daily.    albuterol (ACCUNEB) 1.25 mg/3 mL Nebu Take 3 mLs (1.25 mg total) by nebulization every 6 (six) hours as needed. Rescue    albuterol 90 mcg/actuation inhaler Inhale 2 puffs into the lungs every 6 (six) hours as needed for Wheezing. Rescue    albuterol-ipratropium 2.5mg-0.5mg/3mL (DUO-NEB) 0.5 mg-3 mg(2.5 mg base)/3 mL nebulizer solution Take 3 mLs by nebulization every 6 (six) hours as needed for Wheezing or Shortness of Breath. Rescue    atorvastatin (LIPITOR) 20 MG tablet Take 1 tablet (20 mg total) by mouth once daily.    blood sugar diagnostic Strp 1 each by Misc.(Non-Drug; Combo Route) route 4 (four) times daily before meals and nightly. ACCU CHEK ELVIA PLUS METER    blood-glucose meter  Misc 1 each by Misc.(Non-Drug; Combo Route) route 4 (four) times daily before meals and nightly. ACCU CHEK ELVIA PLUS METER    butalbital-acetaminophen-caffeine -40 mg (FIORICET, ESGIC) -40 mg per tablet Take 1 tablet by mouth every 4 (four) hours as needed for Pain.    capsaicin 0.1 % Crea Apply 1 application topically 2 (two) times daily.    desloratadine (CLARINEX) 5 mg tablet Take 1 tablet (5 mg total) by mouth once daily.    EPIPEN 2-RHODA 0.3 mg/0.3 mL (1:1,000) AtIn     fluticasone (FLONASE) 50 mcg/actuation nasal spray 1 spray by Each Nare route daily as needed.    fluticasone-vilanterol (BREO ELLIPTA) 200-25 mcg/dose DsDv diskus inhaler Inhale 1 puff into the lungs once daily.    furosemide (LASIX) 20 MG tablet Take 1 tablet (20 mg total) by mouth once daily.    gabapentin (NEURONTIN) 300 MG capsule Take 2 capsules (600 mg total) by mouth every evening.    hydrocortisone 2.5 % cream Apply topically 2 (two) times daily.    lamotrigine (LAMICTAL) 25 MG tablet Take 1 tablet (25 mg total) by mouth 2 (two) times daily.    lancets (ACCU-CHEK SOFTCLIX LANCETS) Misc 1 Device by Misc.(Non-Drug; Combo Route) route 4 (four) times daily.    lancets 25 gauge Misc 1 lancet by Misc.(Non-Drug; Combo Route) route 4 (four) times daily before meals and nightly.    levoFLOXacin (LEVAQUIN) 750 MG tablet Take 1 tablet (750 mg total) by mouth once daily.    LINZESS 290 mcg Cap TK ONE CAPSULE PO D ON AN EMPTY STOMACH    losartan (COZAAR) 100 MG tablet Take 1 tablet (100 mg total) by mouth once daily.    metformin (GLUCOPHAGE-XR) 500 MG 24 hr tablet Take 2 tablets (1,000 mg total) by mouth 2 (two) times daily with meals.    montelukast (SINGULAIR) 10 mg tablet Take 1 tablet (10 mg total) by mouth once daily.    mupirocin (BACTROBAN) 2 % ointment Apply topically 2 (two) times daily.    NOVOLOG FLEXPEN 100 unit/mL InPn pen ADM 15 UNITS SC TID B MEALS    nystatin-triamcinolone (MYCOLOG) ointment Apply topically 2 (two) times  "daily.    oxycodone-acetaminophen (PERCOCET)  mg per tablet Take 1 tablet by mouth every 4 (four) hours as needed for Pain.    pantoprazole (PROTONIX) 40 MG tablet once daily.     primidone (MYSOLINE) 50 MG Tab Take 1 tablet (50 mg total) by mouth every evening.    TAZTIA  mg CpSR TK ONE CAPSULE PO D    topiramate (TOPAMAX) 200 MG Tab Take 1 tablet (200 mg total) by mouth 2 (two) times daily.    tramadol (ULTRAM) 50 mg tablet Take 1 tablet (50 mg total) by mouth every 6 (six) hours as needed for Pain.    venlafaxine (EFFEXOR-XR) 75 MG 24 hr capsule Take 75 mg by mouth once daily.           Clinical Reference Information           Your Vitals Were     Height Weight Last Period BMI       5' 4" (1.626 m) 163.7 kg (361 lb) (Exact Date) 61.97 kg/m2       Allergies as of 5/1/2017     No Known Allergies      Immunizations Administered on Date of Encounter - 5/1/2017     None      [facility] Sign-Up     Activating your [facility] account is as easy as 1-2-3!     1) Visit [email or web address], select Sign Up Now, enter this activation code and your date of birth, then select Next.  [email or web address]  Expires: 5/30/2017  4:38 PM      2) Create a username and password to use when you visit [facility] in the future and select a security question in case you lose your password and select Next.    3) Enter your e-mail address and click Sign Up!    Additional Information  If you have questions, please e-mail [email or web address] or call [phone] to talk to our [facility] staff. Remember, [facility] is NOT to be used for urgent needs. For medical emergencies, dial 911.         Smoking Cessation     If you would like to quit smoking:   You may be eligible for free services if you are a [place] resident and started smoking cigarettes before September 1, 1988.  Call the Smoking Cessation Trust ([facility]) toll free at ([phone] or [phone].   Call 4-800-QUIT-NOW if you do not meet the above criteria.   Contact us via " email: [email or web address]   View our website for more information: [email or web address]        Language Assistance Services     ATTENTION: Language assistance services are available, free of charge. Please call [phone].      ATENCIÓN: Si habla kirsty, tiene a lee disposición servicios gratuitos de asistencia lingüística. Llame al [phone].     CHÚ Ý: N?u b?n nói Ti?ng Vi?t, có các d?ch v? h? tr? ngôn ng? mi?n phí dành cho b?n. G?i s? [phone].         [facility] complies with applicable Federal civil rights laws and does not discriminate on the basis of race, color, national origin, age, disability, or sex.

## 2017-05-08 ENCOUNTER — OFFICE VISIT (OUTPATIENT)
Dept: FAMILY MEDICINE | Facility: CLINIC | Age: 47
End: 2017-05-08
Payer: MEDICARE

## 2017-05-08 VITALS
HEIGHT: 64 IN | RESPIRATION RATE: 18 BRPM | HEART RATE: 82 BPM | OXYGEN SATURATION: 97 % | BODY MASS INDEX: 50.02 KG/M2 | WEIGHT: 293 LBS | TEMPERATURE: 98 F

## 2017-05-08 DIAGNOSIS — R25.1 TREMOR OF BOTH HANDS: Primary | ICD-10-CM

## 2017-05-08 PROCEDURE — 1160F RVW MEDS BY RX/DR IN RCRD: CPT | Mod: S$GLB,,, | Performed by: FAMILY MEDICINE

## 2017-05-08 PROCEDURE — 99999 PR PBB SHADOW E&M-EST. PATIENT-LVL III: CPT | Mod: PBBFAC,,, | Performed by: FAMILY MEDICINE

## 2017-05-08 PROCEDURE — 99214 OFFICE O/P EST MOD 30 MIN: CPT | Mod: S$GLB,,, | Performed by: FAMILY MEDICINE

## 2017-05-08 RX ORDER — PROPRANOLOL HYDROCHLORIDE 20 MG/1
20 TABLET ORAL 2 TIMES DAILY
Qty: 60 TABLET | Refills: 0 | Status: SHIPPED | OUTPATIENT
Start: 2017-05-08 | End: 2018-08-21

## 2017-05-10 ENCOUNTER — OUTPATIENT CASE MANAGEMENT (OUTPATIENT)
Dept: ADMINISTRATIVE | Facility: OTHER | Age: 47
End: 2017-05-10

## 2017-05-10 DIAGNOSIS — M79.673 FOOT PAIN: Primary | ICD-10-CM

## 2017-05-10 NOTE — PROGRESS NOTES
Follow up with pt. Reviewed upcoming appts. Noted Dr. Gordillo ordered PT/OT today. Pt has appt with Dr. Lew for sinusitis. PT has wound/fissure to right foot. Bactroban ordered. Reinforced teaching on foot care. Pt instructed to keep feet moisturized and to avoid walking without shoes. Pt verbalized understanding. Will follow up in 2 weeks.

## 2017-05-19 ENCOUNTER — TELEPHONE (OUTPATIENT)
Dept: PULMONOLOGY | Facility: CLINIC | Age: 47
End: 2017-05-19

## 2017-05-19 ENCOUNTER — TELEPHONE (OUTPATIENT)
Dept: CRITICAL CARE MEDICINE | Facility: HOSPITAL | Age: 47
End: 2017-05-19

## 2017-05-19 DIAGNOSIS — G47.33 OSA (OBSTRUCTIVE SLEEP APNEA): Primary | ICD-10-CM

## 2017-05-19 NOTE — TELEPHONE ENCOUNTER
Spoken with pt stating she will give the clinic a call back to give the number to the sleep clinic were she had her sleep study done

## 2017-05-19 NOTE — TELEPHONE ENCOUNTER
----- Message from Pj Harrell MD sent at 5/1/2017  9:16 AM CDT -----  Regarding: FW: sleep record from [facility]  Please attempt to contact [facility]/patient to get record of sleep study.  Please document your findings.    thanks  ----- Message -----     From: Pj Harrell MD     Sent: 3/24/2017  11:15 AM       To: Pj Harrell MD  Subject: sleep record from [facility]

## 2017-05-21 NOTE — PROGRESS NOTES
"Routine Office Visit    Patient Name: Yanni Kaiser    : 1970  MRN: [id number]    Subjective:  Yanni is a 46 y.o. female who presents today for:    1. Tremor in both hands  Patient presenting today with tremor in both hands.  She states that this episode has been ongoing for several days.  She has been having this off and on for several years.  She has discussed it with her neurologist, but no cause has been found per patient.  She states that she drops things on occasion after holding them for "a while".  There is no numbness or tingling.  The tremor occurs at random and she doesn't know anything that makes them occur or makes them go away.  She states she has never taken any medication to help them.  She is scheduled to see her neurologist in the next couple of weeks.      Past Medical History  Past Medical History:   Diagnosis Date    Allergy     Asthma     Diabetes mellitus, type 2     GERD (gastroesophageal reflux disease)     High cholesterol     Hypertension     Pseudotumor cerebri     Seizures     Sleep apnea        Past Surgical History  Past Surgical History:   Procedure Laterality Date     SECTION      CHOLECYSTECTOMY      SINUS SURGERY         Family History  Family History   Problem Relation Age of Onset    Cancer Mother     Hypertension Mother     Diabetes Mother     Stroke Father     Heart disease Father     COPD Father     Heart attack Father     Cancer Maternal Grandmother        Social History  Social History     Social History    Marital status:      Spouse name: N/A    Number of children: N/A    Years of education: N/A     Occupational History    Not on file.     Social History Main Topics    Smoking status: Current Every Day Smoker     Packs/day: 1.00     Years: 15.00     Types: Cigarettes     Last attempt to quit: 6/10/2015    Smokeless tobacco: Never Used    Alcohol use No    Drug use: No    Sexual activity: Yes     Partners: Male     " Birth control/ protection: None     Other Topics Concern    Not on file     Social History Narrative    No narrative on file       Current Medications  Current Outpatient Prescriptions on File Prior to Visit   Medication Sig Dispense Refill    acetaZOLAMIDE (DIAMOX) 500 mg CpSR Take 500 mg by mouth 2 (two) times daily.      albuterol (ACCUNEB) 1.25 mg/3 mL Nebu Take 3 mLs (1.25 mg total) by nebulization every 6 (six) hours as needed. Rescue 100 mL 0    albuterol 90 mcg/actuation inhaler Inhale 2 puffs into the lungs every 6 (six) hours as needed for Wheezing. Rescue 18 g 0    albuterol-ipratropium 2.5mg-0.5mg/3mL (DUO-NEB) 0.5 mg-3 mg(2.5 mg base)/3 mL nebulizer solution Take 3 mLs by nebulization every 6 (six) hours as needed for Wheezing or Shortness of Breath. Rescue 100 mL 0    atorvastatin (LIPITOR) 20 MG tablet Take 1 tablet (20 mg total) by mouth once daily. 90 tablet 1    blood sugar diagnostic Strp 1 each by Misc.(Non-Drug; Combo Route) route 4 (four) times daily before meals and nightly. ACCU CHEK ELVIA PLUS METER 200 each 3    butalbital-acetaminophen-caffeine -40 mg (FIORICET, ESGIC) -40 mg per tablet Take 1 tablet by mouth every 4 (four) hours as needed for Pain.      capsaicin 0.1 % Crea Apply 1 application topically 2 (two) times daily. 56.6 g 1    fluticasone (FLONASE) 50 mcg/actuation nasal spray 1 spray by Each Nare route daily as needed. 16 g 1    fluticasone-vilanterol (BREO ELLIPTA) 200-25 mcg/dose DsDv diskus inhaler Inhale 1 puff into the lungs once daily. 1 each 3    gabapentin (NEURONTIN) 300 MG capsule Take 2 capsules (600 mg total) by mouth every evening. 180 capsule 11    lamotrigine (LAMICTAL) 25 MG tablet Take 1 tablet (25 mg total) by mouth 2 (two) times daily. 60 tablet 11    lancets (ACCU-CHEK SOFTCLIX LANCETS) Misc 1 Device by Misc.(Non-Drug; Combo Route) route 4 (four) times daily. 200 each 3    lancets 25 gauge Misc 1 lancet by Misc.(Non-Drug; Combo  Route) route 4 (four) times daily before meals and nightly. 200 each 11    LINZESS 290 mcg Cap TK ONE CAPSULE PO D ON AN EMPTY STOMACH  5    losartan (COZAAR) 100 MG tablet Take 1 tablet (100 mg total) by mouth once daily. 90 tablet 3    metformin (GLUCOPHAGE-XR) 500 MG 24 hr tablet Take 2 tablets (1,000 mg total) by mouth 2 (two) times daily with meals. 360 tablet 0    oxycodone-acetaminophen (PERCOCET)  mg per tablet Take 1 tablet by mouth every 4 (four) hours as needed for Pain. 60 tablet 0    pantoprazole (PROTONIX) 40 MG tablet once daily.   0    primidone (MYSOLINE) 50 MG Tab Take 1 tablet (50 mg total) by mouth every evening. 90 tablet 3    TAZTIA  mg CpSR TK ONE CAPSULE PO D  2    topiramate (TOPAMAX) 200 MG Tab Take 1 tablet (200 mg total) by mouth 2 (two) times daily. 60 tablet 11    tramadol (ULTRAM) 50 mg tablet Take 1 tablet (50 mg total) by mouth every 6 (six) hours as needed for Pain. 40 tablet 0    venlafaxine (EFFEXOR-XR) 75 MG 24 hr capsule Take 75 mg by mouth once daily.      desloratadine (CLARINEX) 5 mg tablet Take 1 tablet (5 mg total) by mouth once daily. 30 tablet 11    EPIPEN 2-RHODA 0.3 mg/0.3 mL (1:1,000) AtIn   1    furosemide (LASIX) 20 MG tablet Take 1 tablet (20 mg total) by mouth once daily. 5 tablet 0    hydrocortisone 2.5 % cream Apply topically 2 (two) times daily. 20 g 0    nystatin-triamcinolone (MYCOLOG) ointment Apply topically 2 (two) times daily. 60 g 2     No current facility-administered medications on file prior to visit.        Allergies   Review of patient's allergies indicates:  No Known Allergies    Review of Systems (Pertinent positives)  Review of Systems   Constitutional: Negative.    Eyes: Negative.    Respiratory: Negative.    Cardiovascular: Negative.    Gastrointestinal: Negative.    Musculoskeletal: Negative.    Skin: Negative.    Neurological: Positive for tremors and headaches.         Pulse 82   Temp 98 °F (36.7 °C) (Oral)   Resp  "18   Ht 5' 4" (1.626 m)   Wt (!) 163 kg (359 lb 5.6 oz)   LMP 04/26/2017 (Approximate)   SpO2 97%   BMI 61.68 kg/m²     GENERAL APPEARANCE: in no apparent distress and well developed and well nourished  HEENT: PERRL, EOMI, Sclera clear, anicteric, Oropharynx clear, no lesions, Neck supple with midline trachea  NECK: normal, supple, no adenopathy, thyroid normal in size  RESPIRATORY: appears well, vitals normal, no respiratory distress, acyanotic, normal RR, chest clear, no wheezing, crepitations, rhonchi, normal symmetric air entry  HEART: regular rate and rhythm, S1, S2 normal, no murmur, click, rub or gallop.    ABDOMEN: abdomen is soft without tenderness, no masses, no hernias, no organomegaly, no rebound, no guarding. Suprapubic tenderness absent. No CVA tenderness.  NEUROLOGIC: normal without focal findings, CN II-XII are intact. Strength 5/5 in all extremities; tremor occurs at random during exam and appears to disappear when patient is distracted  SKIN: no rashes, no wounds, no other lesions  PSYCH: Alert, oriented x 3, thought content appropriate, speech normal, pleasant and cooperative, good eye contact, well groomed,    Assessment/Plan:  Yanni Kaiser is a 46 y.o. female who presents today for :    Yanni was seen today for back pain and shaking.    Diagnoses and all orders for this visit:    Tremor of both hands  -     propranolol (INDERAL) 20 MG tablet; Take 1 tablet (20 mg total) by mouth 2 (two) times daily.      1.  Patient to take propanolol for tremors until seen by neurology  2.  Patient has paperwork for long term disability, but was told again I don't do long term disability  3.  She is to discuss her tremors with neurology  4.  She is to follow up as needed  5.  Discussed that propanolol may worsen asthma symptoms, so she is to let me know if any of her symptoms worsen    Carlyle Gordillo MD    "

## 2017-05-23 ENCOUNTER — OUTPATIENT CASE MANAGEMENT (OUTPATIENT)
Dept: ADMINISTRATIVE | Facility: OTHER | Age: 47
End: 2017-05-23

## 2017-05-24 ENCOUNTER — OUTPATIENT CASE MANAGEMENT (OUTPATIENT)
Dept: ADMINISTRATIVE | Facility: OTHER | Age: 47
End: 2017-05-24

## 2017-05-24 NOTE — PROGRESS NOTES
Please note this patient was mailed a Patient Satisfaction Discharge Survey on 5-24-17        Thank you,      Mirela Encarnacion, SSC

## 2017-05-25 ENCOUNTER — OFFICE VISIT (OUTPATIENT)
Dept: NEUROLOGY | Facility: CLINIC | Age: 47
End: 2017-05-25
Payer: MEDICARE

## 2017-05-25 VITALS
WEIGHT: 293 LBS | HEART RATE: 84 BPM | BODY MASS INDEX: 50.02 KG/M2 | DIASTOLIC BLOOD PRESSURE: 91 MMHG | SYSTOLIC BLOOD PRESSURE: 140 MMHG | HEIGHT: 64 IN

## 2017-05-25 DIAGNOSIS — G93.2 IDIOPATHIC INTRACRANIAL HYPERTENSION: Chronic | ICD-10-CM

## 2017-05-25 DIAGNOSIS — G40.909 SEIZURE DISORDER: Primary | Chronic | ICD-10-CM

## 2017-05-25 DIAGNOSIS — G43.909 MIGRAINE WITHOUT STATUS MIGRAINOSUS, NOT INTRACTABLE, UNSPECIFIED MIGRAINE TYPE: Chronic | ICD-10-CM

## 2017-05-25 DIAGNOSIS — G40.909 NONINTRACTABLE EPILEPSY WITHOUT STATUS EPILEPTICUS, UNSPECIFIED EPILEPSY TYPE: Chronic | ICD-10-CM

## 2017-05-25 PROCEDURE — 99999 PR PBB SHADOW E&M-EST. PATIENT-LVL III: CPT | Mod: PBBFAC,,, | Performed by: PSYCHIATRY & NEUROLOGY

## 2017-05-25 PROCEDURE — 99499 UNLISTED E&M SERVICE: CPT | Mod: S$GLB,,, | Performed by: PSYCHIATRY & NEUROLOGY

## 2017-05-25 PROCEDURE — 99213 OFFICE O/P EST LOW 20 MIN: CPT | Mod: S$GLB,,, | Performed by: PSYCHIATRY & NEUROLOGY

## 2017-05-25 NOTE — LETTER
May 25, 2017      Carlyle Gordillo MD  [address] [place] [address]           [facility]  [address] [facility] [address]  Phone: [phone]  Fax: [phone]          Patient: Yanni Kaiser   MR Number: [id number]   YOB: 1970   Date of Visit: 5/25/2017       Dear Dr. Carlyle KELLY Page:    Thank you for referring Yanni Kaiser to me for evaluation. Attached you will find relevant portions of my assessment and plan of care.    If you have questions, please do not hesitate to call me. I look forward to following Yanni Kaiser along with you.    Sincerely,    Riccardo Moore MD    Enclosure  CC:  No Recipients    If you would like to receive this communication electronically, please contact [email or web address] or [phone] to request more information on [facility] access.    For providers and/or their staff who would like to refer a patient to [facility], please contact us through our one-stop-shop provider referral line, [facility], at [phone].    If you feel you have received this communication in error or would no longer like to receive these types of communications, please e-mail [email or web address]

## 2017-05-25 NOTE — PATIENT INSTRUCTIONS
Stop primidone (mysoline) and lamictal (lamotrigine)    Follow up for epilepsy monitoring unit admit

## 2017-05-25 NOTE — PROGRESS NOTES
"[facility] - NEUROLOGY  [facility] [place]    Date: May 25, 2017   Patient Name: Yanni Kaiser   MRN: [id number]   PCP: Carlyle Gordillo  Referring Provider: Carlyle Gordillo MD    Assessment:      This is Yanni Kaiser, 46 y.o. female with morbid obesity, history of IIH, and seizures.  LP 1/2017 with OP 14 and 3/2016 with OP 24, recent optho exam without evidence of ongoing increased ICP.  IIH does not appear to be an active problem despite ongoing daily headache which has a medication over use component.  Her excessive sedation is likely due in part to SHARATH which is untreated, currently following with sleep, but her medication regiment is unnecessarily extensive.  Will attempt to wean medications.  Her seizure history has not been clearly documented, will plan for EMU admit for characterization.    Will need to re-address bariatric surgery, previously declined referral    Plan:      Stop primidone and lamictal, consider stopping gabapentin on EMU discharge  Continue to follow with sleep for SHARATH  Continue ophtho appointments for serial OCT to monitor symptoms     Return after EMU       I discussed side effects of the medications. I asked the patient to  stop the medication if She notices serious adverse effects as we discussed and to seek immediate medical attention at an ER.     Riccardo Moore MD  [facility]   Department of Neurology    Subjective:   ED presentation for syncopal episode 4/2017, admit for DKA 2/2017 which she attributes to steroids for asthma exacerbation  Reports staring spell about every two weeks, unsure when last convulsion was  Continues with headaches "every other day", takes daily tylenol and excedrine migraine about twice a week  Unsure what medication has been most helpful for headache    2/2017  Baseline 5/10 headache with L>R shooting pains and photophobia.  Currently taking diamox, TPM, GBP, LTG although not sure of doses.  " Endorses poor sleep due to frequent awakenings to urinate  Reports initial seizure was GTC on Julian Gr[place] day 2002.  Reports GTC typically occur from sleep but none in over a year.  Reports staring spells but does not know how frequent they.  Previously on keppra.    PAST MEDICAL HISTORY:  Past Medical History:   Diagnosis Date    Allergy     Asthma     Diabetes mellitus, type 2     GERD (gastroesophageal reflux disease)     High cholesterol     Hypertension     Pseudotumor cerebri     Seizures     Sleep apnea        PAST SURGICAL HISTORY:  Past Surgical History:   Procedure Laterality Date     SECTION      CHOLECYSTECTOMY      SINUS SURGERY         CURRENT MEDS:  Current Outpatient Prescriptions   Medication Sig Dispense Refill    ACCU-CHEK ELVIA PLUS METER Misc TEST FOUR TIMES DAILY BEFORE MEALS AND EVERY NIGHT 90 each 1    acetaZOLAMIDE (DIAMOX) 500 mg CpSR Take 500 mg by mouth 2 (two) times daily.      albuterol (ACCUNEB) 1.25 mg/3 mL Nebu Take 3 mLs (1.25 mg total) by nebulization every 6 (six) hours as needed. Rescue 100 mL 0    albuterol 90 mcg/actuation inhaler Inhale 2 puffs into the lungs every 6 (six) hours as needed for Wheezing. Rescue 18 g 0    albuterol-ipratropium 2.5mg-0.5mg/3mL (DUO-NEB) 0.5 mg-3 mg(2.5 mg base)/3 mL nebulizer solution Take 3 mLs by nebulization every 6 (six) hours as needed for Wheezing or Shortness of Breath. Rescue 100 mL 0    atorvastatin (LIPITOR) 20 MG tablet Take 1 tablet (20 mg total) by mouth once daily. 90 tablet 1    blood sugar diagnostic Strp 1 each by Misc.(Non-Drug; Combo Route) route 4 (four) times daily before meals and nightly. ACCU CHEK ELVIA PLUS METER 200 each 3    butalbital-acetaminophen-caffeine -40 mg (FIORICET, ESGIC) -40 mg per tablet Take 1 tablet by mouth every 4 (four) hours as needed for Pain.      capsaicin 0.1 % Crea Apply 1 application topically 2 (two) times daily. 56.6 g 1    desloratadine (CLARINEX) 5 mg  tablet Take 1 tablet (5 mg total) by mouth once daily. 30 tablet 11    EPIPEN 2-RHODA 0.3 mg/0.3 mL (1:1,000) AtIn   1    fluticasone (FLONASE) 50 mcg/actuation nasal spray 1 spray by Each Nare route daily as needed. 16 g 1    fluticasone-vilanterol (BREO ELLIPTA) 200-25 mcg/dose DsDv diskus inhaler Inhale 1 puff into the lungs once daily. 1 each 3    gabapentin (NEURONTIN) 300 MG capsule Take 2 capsules (600 mg total) by mouth every evening. 180 capsule 11    lamotrigine (LAMICTAL) 25 MG tablet Take 1 tablet (25 mg total) by mouth 2 (two) times daily. 60 tablet 11    lancets (ACCU-CHEK SOFTCLIX LANCETS) Misc 1 Device by Misc.(Non-Drug; Combo Route) route 4 (four) times daily. 200 each 3    lancets 25 gauge Misc 1 lancet by Misc.(Non-Drug; Combo Route) route 4 (four) times daily before meals and nightly. 200 each 11    LINZESS 290 mcg Cap TK ONE CAPSULE PO D ON AN EMPTY STOMACH  5    losartan (COZAAR) 100 MG tablet Take 1 tablet (100 mg total) by mouth once daily. 90 tablet 3    metformin (GLUCOPHAGE-XR) 500 MG 24 hr tablet Take 2 tablets (1,000 mg total) by mouth 2 (two) times daily with meals. 360 tablet 0    oxycodone-acetaminophen (PERCOCET)  mg per tablet Take 1 tablet by mouth every 4 (four) hours as needed for Pain. 60 tablet 0    pantoprazole (PROTONIX) 40 MG tablet once daily.   0    primidone (MYSOLINE) 50 MG Tab Take 1 tablet (50 mg total) by mouth every evening. 90 tablet 3    propranolol (INDERAL) 20 MG tablet Take 1 tablet (20 mg total) by mouth 2 (two) times daily. 60 tablet 0    TAZTIA  mg CpSR TK ONE CAPSULE PO D  2    topiramate (TOPAMAX) 200 MG Tab Take 1 tablet (200 mg total) by mouth 2 (two) times daily. 60 tablet 11    tramadol (ULTRAM) 50 mg tablet Take 1 tablet (50 mg total) by mouth every 6 (six) hours as needed for Pain. 40 tablet 0    venlafaxine (EFFEXOR-XR) 75 MG 24 hr capsule Take 75 mg by mouth once daily.      furosemide (LASIX) 20 MG tablet Take 1 tablet  "(20 mg total) by mouth once daily. 5 tablet 0    hydrocortisone 2.5 % cream Apply topically 2 (two) times daily. 20 g 0    nystatin-triamcinolone (MYCOLOG) ointment Apply topically 2 (two) times daily. 60 g 2     No current facility-administered medications for this visit.        ALLERGIES:  Review of patient's allergies indicates:  No Known Allergies    FAMILY HISTORY:  Family History   Problem Relation Age of Onset    Cancer Mother     Hypertension Mother     Diabetes Mother     Stroke Father     Heart disease Father     COPD Father     Heart attack Father     Cancer Maternal Grandmother        SOCIAL HISTORY:  Social History   Substance Use Topics    Smoking status: Current Every Day Smoker     Packs/day: 1.00     Years: 15.00     Types: Cigarettes     Last attempt to quit: 6/10/2015    Smokeless tobacco: Never Used    Alcohol use No       Review of Systems:  12 review of systems is negative except for the symptoms mentioned in HPI.        Objective:     Vitals:    05/25/17 0908   BP: (!) 140/91   Pulse: 84   Weight: (!) 165.2 kg (364 lb 3.2 oz)   Height: 5' 4" (1.626 m)       General: NAD, well nourished   Eyes: no tearing, discharge, no erythema   ENT: moist mucous membranes of the oral cavity, nares patent    Neck: Supple, full range of motion  Cardiovascular: Warm and well perfused, pulses equal and symmetrical  Lungs: Normal work of breathing, normal chest wall excursions  Skin: No rash, lesions, or breakdown on exposed skin  Psychiatry: frequently closes eyes, appears very apathetic   Abdomen: soft, non tender, non distended  Extremeties: No cyanosis, clubbing or edema.    Neurological   MENTAL STATUS: Alert and oriented to person, place, and time. Attention and concentration within normal limits. Speech without dysarthria, able to name and repeat without difficulty. Recent and remote memory within normal limits   CRANIAL NERVES: Visual fields intact. PERRL. EOMI. Facial sensation intact. Face " symmetrical. Hearing grossly intact. Full shoulder shrug bilaterally. Tongue protrudes midline.    Keeps eyes mostly closed with right > left eyelid fluttering  SENSORY: Sensation is intact to light touch throughout.  MOTOR: Normal bulk and tone. No pronator drift.  Give-away weakness throughout but 5/5 deltoid, biceps, triceps, interosseous, hand  bilaterally. 5/5 iliopsoas, knee extension/flexion, foot dorsi/plantarflexion bilaterally.  Variable postural tremor of bilateral upper extremities.  CEREBELLAR/COORDINATION/GAIT: Gait steady with normal arm swing and stride length.

## 2017-05-26 ENCOUNTER — CLINICAL SUPPORT (OUTPATIENT)
Dept: REHABILITATION | Facility: HOSPITAL | Age: 47
End: 2017-05-26
Attending: FAMILY MEDICINE
Payer: MEDICARE

## 2017-05-26 ENCOUNTER — TELEPHONE (OUTPATIENT)
Dept: FAMILY MEDICINE | Facility: CLINIC | Age: 47
End: 2017-05-26

## 2017-05-26 ENCOUNTER — OFFICE VISIT (OUTPATIENT)
Dept: FAMILY MEDICINE | Facility: CLINIC | Age: 47
End: 2017-05-26
Payer: MEDICARE

## 2017-05-26 VITALS
OXYGEN SATURATION: 97 % | BODY MASS INDEX: 50.02 KG/M2 | HEIGHT: 64 IN | RESPIRATION RATE: 16 BRPM | DIASTOLIC BLOOD PRESSURE: 90 MMHG | WEIGHT: 293 LBS | HEART RATE: 77 BPM | TEMPERATURE: 98 F | SYSTOLIC BLOOD PRESSURE: 144 MMHG

## 2017-05-26 DIAGNOSIS — G93.2 IIH (IDIOPATHIC INTRACRANIAL HYPERTENSION): ICD-10-CM

## 2017-05-26 DIAGNOSIS — M62.81 MUSCLE WEAKNESS: ICD-10-CM

## 2017-05-26 DIAGNOSIS — G44.039 EPISODIC PAROXYSMAL HEMICRANIA, NOT INTRACTABLE: ICD-10-CM

## 2017-05-26 DIAGNOSIS — M79.602 LEFT ARM PAIN: ICD-10-CM

## 2017-05-26 DIAGNOSIS — M79.672 PAIN IN BOTH FEET: Primary | ICD-10-CM
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
## 2017-05-26 DIAGNOSIS — M72.2 PLANTAR FASCIITIS, BILATERAL: ICD-10-CM

## 2017-05-26 DIAGNOSIS — M79.671 PAIN IN BOTH FEET: Primary | ICD-10-CM

## 2017-05-26 PROBLEM — M62.89 MUSCLE TIGHTNESS: Status: RESOLVED | Noted: 2017-03-08 | Resolved: 2017-05-26

## 2017-05-26 PROCEDURE — G8978 MOBILITY CURRENT STATUS: HCPCS | Mod: CL,PN | Performed by: PHYSICAL THERAPIST

## 2017-05-26 PROCEDURE — 97161 PT EVAL LOW COMPLEX 20 MIN: CPT | Mod: PN | Performed by: PHYSICAL THERAPIST

## 2017-05-26 PROCEDURE — 99214 OFFICE O/P EST MOD 30 MIN: CPT | Mod: S$GLB,,, | Performed by: FAMILY MEDICINE

## 2017-05-26 PROCEDURE — G8979 MOBILITY GOAL STATUS: HCPCS | Mod: CK,PN | Performed by: PHYSICAL THERAPIST

## 2017-05-26 PROCEDURE — 97110 THERAPEUTIC EXERCISES: CPT | Mod: PN | Performed by: PHYSICAL THERAPIST

## 2017-05-26 PROCEDURE — 99999 PR PBB SHADOW E&M-EST. PATIENT-LVL IV: CPT | Mod: PBBFAC,,, | Performed by: FAMILY MEDICINE

## 2017-05-26 RX ORDER — OXYCODONE AND ACETAMINOPHEN 10; 325 MG/1; MG/1
1 TABLET ORAL EVERY 4 HOURS PRN
Qty: 60 TABLET | Refills: 0 | Status: SHIPPED | OUTPATIENT
Start: 2017-05-26 | End: 2017-06-05

## 2017-05-26 RX ORDER — TIZANIDINE 4 MG/1
4 TABLET ORAL EVERY 8 HOURS
Qty: 30 TABLET | Refills: 0 | Status: SHIPPED | OUTPATIENT
Start: 2017-05-26 | End: 2017-06-05

## 2017-05-26 NOTE — PLAN OF CARE
TIME RECORD    Date: 2017    Start Time:  1350  Stop Time:  1440      OUTPATIENT PHYSICAL THERAPY   PATIENT EVALUATION  Onset Date: about 1 year prior  Primary Diagnosis:   Foot pain  Treatment Diagnosis: decreased ROM, weakness of foot, poor balance, muscle balance  Past Medical History:   Diagnosis Date    Allergy     Asthma     Diabetes mellitus, type 2     GERD (gastroesophageal reflux disease)     High cholesterol     Hypertension     Pseudotumor cerebri     Seizures     Sleep apnea      Past Surgical History:   Procedure Laterality Date     SECTION      CHOLECYSTECTOMY      SINUS SURGERY       Precautions: falls  Prior Therapy: yes, for feet and lower back   Medications: Yanni Kaiser has a current medication list which includes the following prescription(s): accu-chek nereida plus meter, acetazolamide, albuterol, albuterol, albuterol-ipratropium 2.5mg-0.5mg/3ml, atorvastatin, blood sugar diagnostic, capsaicin, desloratadine, epipen 2-ronan, fluticasone, fluticasone-vilanterol, furosemide, gabapentin, hydrocortisone, lancets, lancets, linzess, losartan, metformin, nystatin-triamcinolone, oxycodone-acetaminophen, pantoprazole, propranolol, taztia xt, topiramate, tramadol, and venlafaxine.  Nutrition:  Obese  History of Present Illness: Pt with 1 year history of bilateral plantar foot pain.   Prior Level of Function: Independent  Social History: on disability - playing with grandchildren   Place of Residence (Steps/Adaptations): lives with daughter and  and grandson - 1 story home - 0 steps  Functional Deficits Leading to Referral/Nature of Injury: difficulty with sit to stand transitions, walking for long periods of time, standing for long periods of time  Patient Therapy Goals: reports she tried to get out of coming back to therapy but was recommended by Dr. Lisa Kaiser states her pain in B feet increasing since last time in PT. She reports  some improvements in PT but was unable to continue due to asthma flare up. Pt reports 3 falls in the last month. Pt reporting she thinks her LLE is weaker than the RLE causing her to put more weight on the right foot. Pt denies drop foot, loss of B/B control, unexplained weight gain/loss, fever, chills, or night. She is due for a new pair of diabetic shoes. She requested that I send today's progress note to her podiatrist Dr. Taylor.     Pain:  Location: plantar surface of bilateral feet, R>L  Description: Aching, Tingling and Sharp  Activities Which Increase Pain: Standing, Walking and Getting out of bed/chair  Activities Which Decrease Pain: nothing  Pain Scale: 4/10 at best 4/10 now  10/10 at worst    Objective     Observation/posture: bilateral navicular drop, endomorph body type, BLE edema   Sensation/Reflexes: grossly intact to light touch throughout BLE  Palpation:   L foot: tenderness to palpation along medial aspect of foot, peroneus group, extensor digitorum longus   R foot: plantar surface of calcaneous, medial aspect of foot  Ankle    Right     Left    Pain/Dysfunction with Movement      AROM  PROM  MMT  AROM  PROM  MMT     Plantarflexion  45 45 4+ 45 45 4-    Dorsiflexion  -15 WNL 4- 3 15 4    Inversion  20 40 2+ 30 40 4    Eversion  5 15 3+ 12 15 4-      toe ROM and strength: ROM WNL   R toe extension: 3+/5; flexion: 4-/5   L toe extension: 3+/5; flexion: 4-/5  hip screen:  R L   Flex 2+ 2   ER 4- 3+   IR 4 4-  Knee screen:   Flex 4 3+   Ext 4 4-    Balance:                 SLS: unable to perform B, LOB with min A to recover (<10 sec indicates high fall risk)  Tandem stance: unable to perform B  Gait: antalgic gait R  FOTO: 72% limitation  Treatment: Physical Therapist educated patient in a home exercise program and issued handout. Patient demonstrating good understanding of education provided and able to return demonstration of correct technique. Patient given the following exercises:       Seated ankle  ABCs x2 rounds    Seated towel crunches 2x10    Seated great to extension stretch 3x30 seconds    Seated gastroc stretch 2x30     Ankle PF (YTB) 2x10     Assessment       Initial Assessment: Ms. Shruthi Kaiser is a 47 y/o female referred to outpatient PT for bilateral foot pain. Pt presenting with the following impairments upon initial evaluation: decreased bilateral ankle ROM, weakness of bilateral ankles and LE's, poor balance, muscle tightness in BLE, and bilateral navicular drop.  Pt would benefit from skilled PT to address above stated problems and maximize functional independence. Pt has set realistic goals and has verbalized good understanding and agreement with reported diagnosis, prognosis and treatment. Pt demonstrates no additional cultural, spiritual or educational need and currently has no barriers to learning.      History  Co-morbidities and personal factors that may impact the plan of care Examination  Body Structures and Functions, activity limitations and participation restrictions that may impact the plan of care Clinical Presentation   Decision Making/ Complexity Score   Co-morbidities:      obesity, HTN, seizure disorder            Personal Factors:   Assist in taking care of her grandson Body Regions: bilateral LE and bilateral feet    Body Systems:   Musculoskeletal:  weakness, gait instability, impaired balance, pain, decreased ROM, impaired joint extensibility and impaired muscle length    Activity limitations:   Unable to walk >30 minutes, stand >30 minute, squat, lift    Participation Restrictions: (W/D/S)  diffiulcty running errands for family  Unable to work secondary to pain       stable  low     Current G-Code:  CL 60-80%  Goal G-Code:  CK 40-60%    Rehab Potiential: fair    Short Term Goals (4 Weeks):   1. PT will report 20% reduction in bilateral foot pain for ease with playing with grandson  2. Pt will demonstrate 1/3 MMT improvement in bilateral ankle and foot strength for  ease with assisting with ADL's  3. Pt will demonstrate SLS on each foot x10 seconds for ease with climbing stairs and decreased risk for falls  4. Patient to be independent with home exercise program for improved self management of condition  Long Term Goals (8 Weeks):   1. Pt will demonstrate improvements in B hip/knee strength by 1/3 MMT for ease with ADL's such as stair climbing  2. PT will demonstrate full ROM and strength in bilateral feet/ankles for ease with taking grandson to the park  3. Pt will report being independent with HEP for maintenance of improvements gained during therapy sessions  4. Pt will score <60% limitation on the FOTO for ease with return to participation within the community    Plan     Certification Period: 5/26/2017 to 7/26/2017  Recommended Treatment Plan: 2 times per week for 8 weeks: Gait Training, Manual Therapy, Moist Heat/ Ice, Neuromuscular Re-ed, Patient Education, Therapeutic Exercise and Ultrasound/Phonophoresis  Other Recommendations: none      Therapist: Alivia Trinh, PT

## 2017-05-26 NOTE — PROGRESS NOTES
Routine Office Visit    Patient Name: Yanni Kaiser    : 1970  MRN: [id number]    Subjective:  Yanni is a 46 y.o. female who presents today for:    1. Fall  Patient presenting today after falling out of the bed 2 nights ago.  States that this is the 3rd time this month she has woken up on the floor while asleep.  She states that she did make her neurologist aware of this yesterday.  Today, she is mainly concerned with pain in her right arm.  There has been no no swelling and no decreased ROM, but pain is worsened with movement of the arm.      Past Medical History  Past Medical History:   Diagnosis Date    Allergy     Asthma     Diabetes mellitus, type 2     GERD (gastroesophageal reflux disease)     High cholesterol     Hypertension     Pseudotumor cerebri     Seizures     Sleep apnea        Past Surgical History  Past Surgical History:   Procedure Laterality Date     SECTION      CHOLECYSTECTOMY      SINUS SURGERY         Family History  Family History   Problem Relation Age of Onset    Cancer Mother     Hypertension Mother     Diabetes Mother     Stroke Father     Heart disease Father     COPD Father     Heart attack Father     Cancer Maternal Grandmother        Social History  Social History     Social History    Marital status:      Spouse name: N/A    Number of children: N/A    Years of education: N/A     Occupational History    Not on file.     Social History Main Topics    Smoking status: Current Every Day Smoker     Packs/day: 1.00     Years: 15.00     Types: Cigarettes     Last attempt to quit: 6/10/2015    Smokeless tobacco: Never Used    Alcohol use No    Drug use: No    Sexual activity: Yes     Partners: Male     Birth control/ protection: None     Other Topics Concern    Not on file     Social History Narrative    No narrative on file       Current Medications  Current Outpatient Prescriptions on File Prior to Visit   Medication Sig Dispense  Refill    ACCU-CHEK ELVIA PLUS METER Misc TEST FOUR TIMES DAILY BEFORE MEALS AND EVERY NIGHT 90 each 1    acetaZOLAMIDE (DIAMOX) 500 mg CpSR Take 500 mg by mouth 2 (two) times daily.      albuterol (ACCUNEB) 1.25 mg/3 mL Nebu Take 3 mLs (1.25 mg total) by nebulization every 6 (six) hours as needed. Rescue 100 mL 0    albuterol 90 mcg/actuation inhaler Inhale 2 puffs into the lungs every 6 (six) hours as needed for Wheezing. Rescue 18 g 0    albuterol-ipratropium 2.5mg-0.5mg/3mL (DUO-NEB) 0.5 mg-3 mg(2.5 mg base)/3 mL nebulizer solution Take 3 mLs by nebulization every 6 (six) hours as needed for Wheezing or Shortness of Breath. Rescue 100 mL 0    atorvastatin (LIPITOR) 20 MG tablet Take 1 tablet (20 mg total) by mouth once daily. 90 tablet 1    blood sugar diagnostic Strp 1 each by Misc.(Non-Drug; Combo Route) route 4 (four) times daily before meals and nightly. ACCU CHEK ELVIA PLUS METER 200 each 3    capsaicin 0.1 % Crea Apply 1 application topically 2 (two) times daily. 56.6 g 1    desloratadine (CLARINEX) 5 mg tablet Take 1 tablet (5 mg total) by mouth once daily. 30 tablet 11    fluticasone (FLONASE) 50 mcg/actuation nasal spray 1 spray by Each Nare route daily as needed. 16 g 1    fluticasone-vilanterol (BREO ELLIPTA) 200-25 mcg/dose DsDv diskus inhaler Inhale 1 puff into the lungs once daily. 1 each 3    gabapentin (NEURONTIN) 300 MG capsule Take 2 capsules (600 mg total) by mouth every evening. 180 capsule 11    hydrocortisone 2.5 % cream Apply topically 2 (two) times daily. 20 g 0    lancets (ACCU-CHEK SOFTCLIX LANCETS) Misc 1 Device by Misc.(Non-Drug; Combo Route) route 4 (four) times daily. 200 each 3    lancets 25 gauge Misc 1 lancet by Misc.(Non-Drug; Combo Route) route 4 (four) times daily before meals and nightly. 200 each 11    LINZESS 290 mcg Cap TK ONE CAPSULE PO D ON AN EMPTY STOMACH  5    losartan (COZAAR) 100 MG tablet Take 1 tablet (100 mg total) by mouth once daily. 90 tablet  "3    metformin (GLUCOPHAGE-XR) 500 MG 24 hr tablet Take 2 tablets (1,000 mg total) by mouth 2 (two) times daily with meals. 360 tablet 0    pantoprazole (PROTONIX) 40 MG tablet once daily.   0    propranolol (INDERAL) 20 MG tablet Take 1 tablet (20 mg total) by mouth 2 (two) times daily. 60 tablet 0    TAZTIA  mg CpSR TK ONE CAPSULE PO D  2    topiramate (TOPAMAX) 200 MG Tab Take 1 tablet (200 mg total) by mouth 2 (two) times daily. 60 tablet 11    tramadol (ULTRAM) 50 mg tablet Take 1 tablet (50 mg total) by mouth every 6 (six) hours as needed for Pain. 40 tablet 0    venlafaxine (EFFEXOR-XR) 75 MG 24 hr capsule Take 75 mg by mouth once daily.      [DISCONTINUED] oxycodone-acetaminophen (PERCOCET)  mg per tablet Take 1 tablet by mouth every 4 (four) hours as needed for Pain. 60 tablet 0    EPIPEN 2-RHODA 0.3 mg/0.3 mL (1:1,000) AtIn   1    furosemide (LASIX) 20 MG tablet Take 1 tablet (20 mg total) by mouth once daily. 5 tablet 0    nystatin-triamcinolone (MYCOLOG) ointment Apply topically 2 (two) times daily. 60 g 2     No current facility-administered medications on file prior to visit.        Allergies   Review of patient's allergies indicates:  No Known Allergies    Review of Systems (Pertinent positives)  Review of Systems   Constitutional: Negative.    HENT: Negative.    Eyes: Negative.    Respiratory: Negative.    Cardiovascular: Negative.    Musculoskeletal: Positive for myalgias.   Skin: Negative.    Neurological: Negative for dizziness, tingling, sensory change and loss of consciousness.         BP (!) 144/90 (BP Location: Left arm, Patient Position: Sitting, BP Method: Manual)   Pulse 77   Temp 98.3 °F (36.8 °C) (Oral)   Resp 16   Ht 5' 4" (1.626 m)   Wt (!) 165.5 kg (364 lb 13.8 oz)   LMP 04/26/2017 (Approximate)   SpO2 97%   BMI 62.63 kg/m²     GENERAL APPEARANCE: in no apparent distress and well developed and well nourished  HEENT: PERRL, EOMI, Sclera clear, anicteric, " Oropharynx clear, no lesions, Neck supple with midline trachea  NECK: normal, supple, no adenopathy, thyroid normal in size  RESPIRATORY: appears well, vitals normal, no respiratory distress, acyanotic, normal RR, chest clear, no wheezing, crepitations, rhonchi, normal symmetric air entry  HEART: regular rate and rhythm, S1, S2 normal, no murmur, click, rub or gallop.    ABDOMEN: abdomen is soft without tenderness, no masses, no hernias, no organomegaly, no rebound, no guarding. Suprapubic tenderness absent. No CVA tenderness.  NEUROLOGIC: normal without focal findings, CN II-XII are intact.    Extremities: warm/well perfused.  No abnormal hair patterns.  No clubbing, cyanosis or edema.  Pain on palpation of left lateral bicep; no deformities  SKIN: no rashes, no wounds, no other lesions  PSYCH: Alert, oriented x 3, thought content appropriate, speech normal, pleasant and cooperative, good eye contact, well groomed    Assessment/Plan:  Yanni Kaiser is a 46 y.o. female who presents today for :    Yanni was seen today for fall, diarrhea and headache.    Diagnoses and all orders for this visit:    Fall, initial encounter    Left arm pain  -     X-Ray Humerus 2 View Left; Future  -     oxycodone-acetaminophen (PERCOCET)  mg per tablet; Take 1 tablet by mouth every 4 (four) hours as needed for Pain.  -     tizanidine (ZANAFLEX) 4 MG tablet; Take 1 tablet (4 mg total) by mouth every 8 (eight) hours.    Episodic paroxysmal hemicrania, not intractable  -     oxycodone-acetaminophen (PERCOCET)  mg per tablet; Take 1 tablet by mouth every 4 (four) hours as needed for Pain.    IIH (idiopathic intracranial hypertension)  -     oxycodone-acetaminophen (PERCOCET)  mg per tablet; Take 1 tablet by mouth every 4 (four) hours as needed for Pain.        1.  Xray to be done today  2.  Pain medicaiton as prescribed  3.  Follow up as needed  4.  She is to keep appointment for sleep study as scheduled  5.   Patient to call with any concerns    Carlyle Gordillo MD

## 2017-05-26 NOTE — TELEPHONE ENCOUNTER
----- Message from Carlyle Gordillo MD sent at 5/26/2017  2:21 PM CDT -----  Please let patient know that xray was normal with no evidence of break    Thanks,  Dr. Gordillo

## 2017-05-28 ENCOUNTER — HOSPITAL ENCOUNTER (EMERGENCY)
Facility: OTHER | Age: 47
Discharge: HOME | End: 2017-05-28
Attending: EMERGENCY MEDICINE
Payer: MEDICARE

## 2017-05-28 VITALS
WEIGHT: 293 LBS | HEIGHT: 64 IN | BODY MASS INDEX: 50.02 KG/M2 | HEART RATE: 80 BPM | SYSTOLIC BLOOD PRESSURE: 142 MMHG | TEMPERATURE: 98 F | RESPIRATION RATE: 16 BRPM | OXYGEN SATURATION: 97 % | DIASTOLIC BLOOD PRESSURE: 94 MMHG

## 2017-05-28 DIAGNOSIS — M79.673 FOOT PAIN: ICD-10-CM
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
## 2017-05-28 DIAGNOSIS — S90.31XA CONTUSION OF RIGHT FOOT, INITIAL ENCOUNTER: ICD-10-CM

## 2017-05-28 PROCEDURE — 25000003 PHARM REV CODE 250: Performed by: EMERGENCY MEDICINE

## 2017-05-28 PROCEDURE — 99283 EMERGENCY DEPT VISIT LOW MDM: CPT

## 2017-05-28 RX ORDER — HYDROCODONE BITARTRATE AND ACETAMINOPHEN 5; 325 MG/1; MG/1
1 TABLET ORAL
Status: COMPLETED | OUTPATIENT
Start: 2017-05-28 | End: 2017-05-28

## 2017-05-28 RX ORDER — ACETAMINOPHEN 325 MG/1
650 TABLET ORAL
Status: COMPLETED | OUTPATIENT
Start: 2017-05-28 | End: 2017-05-28

## 2017-05-28 RX ORDER — IBUPROFEN 600 MG/1
600 TABLET ORAL EVERY 8 HOURS PRN
Qty: 15 TABLET | Refills: 0 | Status: SHIPPED | OUTPATIENT
Start: 2017-05-28 | End: 2017-07-24

## 2017-05-28 RX ADMIN — HYDROCODONE BITARTRATE AND ACETAMINOPHEN 1 TABLET: 5; 325 TABLET ORAL at 07:05

## 2017-05-28 RX ADMIN — ACETAMINOPHEN 650 MG: 325 TABLET ORAL at 04:05

## 2017-05-28 NOTE — ED PROVIDER NOTES
Encounter Date: 2017       History     Chief Complaint   Patient presents with    Fall     Pt states her R foot/ankle gave out and she fell. No loc. Denies head trauma. Pt has no other complaints other than R heel/foot pain.      Review of patient's allergies indicates:  No Known Allergies  Ms Kaiser reports fall ~10:30am at [employer]. Reports mechanical fall, states landed on ankle and foot. Reports she went home and took one of her 's morphine pills and ate then took a nap and awoke and continued to have pain.  Reports pain in ankle diffusely and heel of foot.  Worse when bearing weight on foot.  Denies numbness or tingling.  Remembers all events.  Denies loss of consciousness or head injury      The history is provided by the patient.     Past Medical History:   Diagnosis Date    Allergy     Asthma     Diabetes mellitus, type 2     GERD (gastroesophageal reflux disease)     High cholesterol     Hypertension     Pseudotumor cerebri     Seizures     Sleep apnea      Past Surgical History:   Procedure Laterality Date     SECTION      CHOLECYSTECTOMY      SINUS SURGERY       Family History   Problem Relation Age of Onset    Cancer Mother     Hypertension Mother     Diabetes Mother     Stroke Father     Heart disease Father     COPD Father     Heart attack Father     Cancer Maternal Grandmother      Social History   Substance Use Topics    Smoking status: Current Every Day Smoker     Packs/day: 1.00     Years: 15.00     Types: Cigarettes     Last attempt to quit: 6/10/2015    Smokeless tobacco: Never Used    Alcohol use No     Review of Systems   Constitutional: Negative.    Gastrointestinal: Negative.    Musculoskeletal: Positive for gait problem.        Positive R medial ankle and heel pain.    Skin: Negative.    All other systems reviewed and are negative.      Physical Exam     Initial Vitals [17 1555]   BP Pulse Resp Temp SpO2   (!) 149/87 88 17 98 °F (36.7 °C) 99 %      Physical Exam    Nursing note and vitals reviewed.  Constitutional: She appears well-developed and well-nourished. She is not diaphoretic. No distress.   HENT:   Head: Normocephalic and atraumatic.   Eyes: EOM are normal. Pupils are equal, round, and reactive to light.   Neck: Normal range of motion. Neck supple.   Pulmonary/Chest: No respiratory distress.   Musculoskeletal: She exhibits tenderness. She exhibits no edema.   Pain with full active range of motion and right ankle.  No swelling, deformity, perfusion problems.  Normal 2+ DP pulses bilaterally.  Positive diffuse tenderness to palpation to right heel and right medial inferior ankle.   Neurological: She is alert and oriented to person, place, and time.   Skin: Skin is warm and dry. Capillary refill takes less than 2 seconds.   Psychiatric: She has a normal mood and affect. Her behavior is normal. Thought content normal.         ED Course   Procedures  Labs Reviewed - No data to display          Medical Decision Making:   Differential Diagnosis:   Fx. Contusion, sprain, straing  Clinical Tests:   Radiological Study: Ordered and Reviewed  ED Management:  Xray negative for fx. Pt given ace wrap and crutches. Questions answered. She will f/u w pcp for recheck. Will treat as contusion, as it sound more like the mechanism given clinical picture. We discussed worrisome signs that should prmpt need to return to er should they occur. There is no indication for further emergent intervention or evaluation at this time.        Imaging Results          X-Ray Foot Complete Left (Final result)  Result time 05/28/17 18:18:13    Final result by Manuel Fritz MD (05/28/17 18:18:13)                 Narrative:    Study Desc:   XR FOOT COMPLETE 3 VIEW RIGHT  Clinical History: Twisted ankle and foot.  Comparison: None     FINDINGS:  3 views of the right foot are performed.     No acute fracture identified.  Small plantar calcaneal enthesophyte is noted.  Normal   alignment  without dislocation.     No regional soft tissue swelling. No radiopaque foreign bodies or soft tissue gas.     IMPRESSION:  No acute osseous abnormality of the right foot.     SL 24: Signed by: Manuel Fritz MD  2017-05-28 18:18:11[-0500]                             X-Ray Ankle Complete Right (Final result)  Result time 05/28/17 17:14:31    Final result by Koko Reynolds MD (05/28/17 17:14:31)                 Narrative:    Study Desc:   XR ANKLE COMPLETE 3 VIEW RIGHT  Clinical History: Twisted ankle.     3 views right ankle.     Findings:  There is no acute fracture or dislocation.  There is mild soft tissue swelling   surrounding the right ankle.  Ankle mortise appears symmetric on these nonstress views.    Small bony densities noted in the anterior and posterior surface of the tibia most likely   chronic.  There is a small plantar calcaneal spur.  The visualized joint spaces appear   intact.     If there is persistent clinical concern, follow-up radiographs or MRI is recommended.     Impression:  No acute fractures.     SL: 24 Signed by: Koko Reynolds MD  2017-05-28 17:14:30 [CDT]                                             ED Course     Clinical Impression:   The primary encounter diagnosis was Fall. Diagnoses of Contusion of right foot, initial encounter and Foot pain were also pertinent to this visit.          Jo Milligan MD  06/01/17 0537       Jo Milligan MD  06/01/17 0537

## 2017-05-29 DIAGNOSIS — G40.219 PARTIAL SYMPTOMATIC EPILEPSY WITH COMPLEX PARTIAL SEIZURES, INTRACTABLE, WITHOUT STATUS EPILEPTICUS: Primary | ICD-10-CM

## 2017-06-05 ENCOUNTER — LAB VISIT (OUTPATIENT)
Dept: LAB | Facility: HOSPITAL | Age: 47
End: 2017-06-05
Attending: FAMILY MEDICINE
Payer: MEDICARE

## 2017-06-05 ENCOUNTER — OFFICE VISIT (OUTPATIENT)
Dept: PODIATRY | Facility: CLINIC | Age: 47
End: 2017-06-05
Payer: MEDICARE

## 2017-06-05 VITALS — HEIGHT: 64 IN | WEIGHT: 293 LBS | BODY MASS INDEX: 50.02 KG/M2

## 2017-06-05 DIAGNOSIS — M79.672 HEEL PAIN, BILATERAL: ICD-10-CM

## 2017-06-05 DIAGNOSIS — M79.671 HEEL PAIN, BILATERAL: ICD-10-CM

## 2017-06-05 DIAGNOSIS — M21.40 PES PLANUS, UNSPECIFIED LATERALITY: ICD-10-CM

## 2017-06-05 DIAGNOSIS — R23.4 FISSURE IN SKIN OF FOOT: ICD-10-CM

## 2017-06-05 DIAGNOSIS — L29.9 ITCHING: Primary | ICD-10-CM

## 2017-06-05 DIAGNOSIS — R10.13 EPIGASTRIC PAIN: ICD-10-CM

## 2017-06-05 LAB — LIPASE SERPL-CCNC: 43 U/L

## 2017-06-05 PROCEDURE — 99999 PR PBB SHADOW E&M-EST. PATIENT-LVL II: CPT | Mod: PBBFAC,,, | Performed by: PODIATRIST

## 2017-06-05 PROCEDURE — 99499 UNLISTED E&M SERVICE: CPT | Mod: S$GLB,,, | Performed by: PODIATRIST

## 2017-06-05 PROCEDURE — 4010F ACE/ARB THERAPY RXD/TAKEN: CPT | Mod: S$GLB,,, | Performed by: PODIATRIST

## 2017-06-05 PROCEDURE — 99214 OFFICE O/P EST MOD 30 MIN: CPT | Mod: S$GLB,,, | Performed by: PODIATRIST

## 2017-06-05 PROCEDURE — 3046F HEMOGLOBIN A1C LEVEL >9.0%: CPT | Mod: S$GLB,,, | Performed by: PODIATRIST

## 2017-06-05 RX ORDER — HYDROXYZINE PAMOATE 50 MG/1
50 CAPSULE ORAL EVERY 8 HOURS PRN
Qty: 30 CAPSULE | Refills: 1 | Status: SHIPPED | OUTPATIENT
Start: 2017-06-05 | End: 2017-09-01 | Stop reason: SDUPTHER

## 2017-06-05 RX ORDER — MONTELUKAST SODIUM 10 MG/1
TABLET ORAL
Refills: 1 | COMMUNITY
Start: 2017-06-01 | End: 2018-01-04 | Stop reason: ALTCHOICE

## 2017-06-05 NOTE — PROGRESS NOTES
"Subjective:      Patient ID: Yanni Kaiser is a 46 y.o. female.    Chief Complaint: Foot Pain (right foot pcp Dr. Gordillo 5-26-17)    Yanni is a 46 y.o. female who RTC for follow up of bilateral foot and heel pain. Nothing helping with pain, tramadol dispensed by my colleague did not provide relief. PT is "ok."  Only thing that helped improve pain in the past is Roxicodone.     Extreme pain, moaning, 4 falls in last month, EEG scheduled with neurology next month     This patient has documented high risk feet requiring routine maintenance secondary to diabetes mellitis and those secondary complications of diabetes, as mentioned..    PCP: Carlyle Gordillo MD    Date Last Seen by PCP:   Chief Complaint   Patient presents with    Foot Pain     right foot pcp Dr. Gordillo 5-26-17     Current shoe gear:  barbi    Hemoglobin A1C   Date Value Ref Range Status   02/21/2017 10.3 (H) 4.5 - 6.2 % Final     Comment:     According to ADA guidelines, hemoglobin A1C <7.0% represents  optimal control in non-pregnant diabetic patients.  Different  metrics may apply to specific populations.   Standards of Medical Care in Diabetes - 2016.  For the purpose of screening for the presence of diabetes:  <5.7%     Consistent with the absence of diabetes  5.7-6.4%  Consistent with increasing risk for diabetes   (prediabetes)  >or=6.5%  Consistent with diabetes  Currently no consensus exists for use of hemoglobin A1C  for diagnosis of diabetes for children.     01/11/2017 6.7 (H) 4.5 - 6.2 % Final     Comment:     According to ADA guidelines, hemoglobin A1C <7.0% represents  optimal control in non-pregnant diabetic patients.  Different  metrics may apply to specific populations.   Standards of Medical Care in Diabetes - 2016.  For the purpose of screening for the presence of diabetes:  <5.7%     Consistent with the absence of diabetes  5.7-6.4%  Consistent with increasing risk for diabetes   (prediabetes)  >or=6.5%  Consistent with " diabetes  Currently no consensus exists for use of hemoglobin A1C  for diagnosis of diabetes for children.     10/17/2016 6.6 (H) 4.5 - 6.2 % Final     Comment:     According to ADA guidelines, hemoglobin A1C <7.0% represents  optimal control in non-pregnant diabetic patients.  Different  metrics may apply to specific populations.   Standards of Medical Care in Diabetes - 2016.  For the purpose of screening for the presence of diabetes:  <5.7%     Consistent with the absence of diabetes  5.7-6.4%  Consistent with increasing risk for diabetes   (prediabetes)  >or=6.5%  Consistent with diabetes  Currently no consensus exists for use of hemoglobin A1C  for diagnosis of diabetes for children.       Patient Active Problem List   Diagnosis    Body mass index 60.0-69.9, adult    Mild intermittent asthma    SHARATH (obstructive sleep apnea)    Leg varices    Low back pain    Seizure disorder    Type 2 diabetes mellitus, controlled    Migraine    Morbid obesity with BMI of 60.0-69.9, adult    Tobacco abuse    Anemia of chronic disease    Mild protein malnutrition    Weakness    Allergic rhinitis    Idiopathic intracranial hypertension    Essential hypertension    Combined hyperlipidemia associated with type 2 diabetes mellitus    Depression    Seizure    Hyperlipidemia    GERD (gastroesophageal reflux disease)    Epilepsy    Plantar fasciitis, bilateral    Papilledema associated with increased intracranial pressure    DUSTIN (acute kidney injury)    Numbness of right hand    Type 2 diabetes mellitus with stage 3 chronic kidney disease, without long-term current use of insulin     Current Outpatient Prescriptions on File Prior to Visit   Medication Sig Dispense Refill    ACCU-CHEK ELVIA PLUS METER Misc TEST FOUR TIMES DAILY BEFORE MEALS AND EVERY NIGHT 90 each 1    acetaZOLAMIDE (DIAMOX) 500 mg CpSR Take 500 mg by mouth 2 (two) times daily.      albuterol (ACCUNEB) 1.25 mg/3 mL Nebu Take 3 mLs (1.25 mg  total) by nebulization every 6 (six) hours as needed. Rescue 100 mL 0    albuterol 90 mcg/actuation inhaler Inhale 2 puffs into the lungs every 6 (six) hours as needed for Wheezing. Rescue 18 g 0    albuterol-ipratropium 2.5mg-0.5mg/3mL (DUO-NEB) 0.5 mg-3 mg(2.5 mg base)/3 mL nebulizer solution Take 3 mLs by nebulization every 6 (six) hours as needed for Wheezing or Shortness of Breath. Rescue 100 mL 0    atorvastatin (LIPITOR) 20 MG tablet Take 1 tablet (20 mg total) by mouth once daily. 90 tablet 1    blood sugar diagnostic Strp 1 each by Misc.(Non-Drug; Combo Route) route 4 (four) times daily before meals and nightly. ACCU CHEK ELVIA PLUS METER 200 each 3    capsaicin 0.1 % Crea Apply 1 application topically 2 (two) times daily. 56.6 g 1    EPIPEN 2-RHODA 0.3 mg/0.3 mL (1:1,000) AtIn   1    fluticasone (FLONASE) 50 mcg/actuation nasal spray 1 spray by Each Nare route daily as needed. 16 g 1    fluticasone-vilanterol (BREO ELLIPTA) 200-25 mcg/dose DsDv diskus inhaler Inhale 1 puff into the lungs once daily. 1 each 3    gabapentin (NEURONTIN) 300 MG capsule Take 2 capsules (600 mg total) by mouth every evening. 180 capsule 11    ibuprofen (ADVIL,MOTRIN) 600 MG tablet Take 1 tablet (600 mg total) by mouth every 8 (eight) hours as needed for Pain. 15 tablet 0    lancets (ACCU-CHEK SOFTCLIX LANCETS) Misc 1 Device by Misc.(Non-Drug; Combo Route) route 4 (four) times daily. 200 each 3    lancets 25 gauge Misc 1 lancet by Misc.(Non-Drug; Combo Route) route 4 (four) times daily before meals and nightly. 200 each 11    LINZESS 290 mcg Cap TK ONE CAPSULE PO D ON AN EMPTY STOMACH  5    losartan (COZAAR) 100 MG tablet Take 1 tablet (100 mg total) by mouth once daily. 90 tablet 3    metformin (GLUCOPHAGE-XR) 500 MG 24 hr tablet Take 2 tablets (1,000 mg total) by mouth 2 (two) times daily with meals. 360 tablet 0    pantoprazole (PROTONIX) 40 MG tablet once daily.   0    propranolol (INDERAL) 20 MG tablet Take 1  tablet (20 mg total) by mouth 2 (two) times daily. 60 tablet 0    TAZTIA  mg CpSR TK ONE CAPSULE PO D  2    [] tizanidine (ZANAFLEX) 4 MG tablet Take 1 tablet (4 mg total) by mouth every 8 (eight) hours. 30 tablet 0    topiramate (TOPAMAX) 200 MG Tab Take 1 tablet (200 mg total) by mouth 2 (two) times daily. 60 tablet 11    venlafaxine (EFFEXOR-XR) 75 MG 24 hr capsule Take 75 mg by mouth once daily.      furosemide (LASIX) 20 MG tablet Take 1 tablet (20 mg total) by mouth once daily. 5 tablet 0    hydrocortisone 2.5 % cream Apply topically 2 (two) times daily. 20 g 0    nystatin-triamcinolone (MYCOLOG) ointment Apply topically 2 (two) times daily. 60 g 2     No current facility-administered medications on file prior to visit.      Review of patient's allergies indicates:  No Known Allergies  Past Surgical History:   Procedure Laterality Date     SECTION      CHOLECYSTECTOMY      SINUS SURGERY       Family History   Problem Relation Age of Onset    Cancer Mother     Hypertension Mother     Diabetes Mother     Stroke Father     Heart disease Father     COPD Father     Heart attack Father     Cancer Maternal Grandmother      Social History     Social History    Marital status:      Spouse name: N/A    Number of children: N/A    Years of education: N/A     Occupational History    Not on file.     Social History Main Topics    Smoking status: Current Every Day Smoker     Packs/day: 1.00     Years: 15.00     Types: Cigarettes     Last attempt to quit: 6/10/2015    Smokeless tobacco: Never Used    Alcohol use No    Drug use: No    Sexual activity: Yes     Partners: Male     Birth control/ protection: None     Other Topics Concern    Not on file     Social History Narrative    No narrative on file        Review of Systems   Constitution: Negative for chills, fever and weakness.   Cardiovascular: Positive for leg swelling. Negative for chest pain and claudication.  "  Respiratory: Negative for cough and shortness of breath.    Skin: Positive for dry skin and nail changes. Negative for itching and rash.   Musculoskeletal: Positive for arthritis, joint pain and myalgias. Negative for falls, joint swelling and muscle weakness.   Gastrointestinal: Negative for diarrhea, nausea and vomiting.   Neurological: Positive for numbness and paresthesias. Negative for tremors.   Psychiatric/Behavioral: Negative for altered mental status and hallucinations.           Objective:       Vitals:    06/05/17 1403   Weight: (!) 156.9 kg (346 lb)   Height: 5' 4" (1.626 m)   PainSc: 10-Worst pain ever   PainLoc: Foot       Physical Exam   Constitutional:   General: Pt. is well-developed, well-nourished, appears stated age, in no acute distress, alert and oriented x 3. No evidence of depression, anxiety, or agitation. Calm, cooperative, and communicative. Appropriate interactions and affect.       Cardiovascular:   Pulses:       Dorsalis pedis pulses are 1+ on the right side, and 1+ on the left side.        Posterior tibial pulses are 1+ on the right side, and 1+ on the left side.   Dorsalis pedis and posterior tibial pulses are diminished Bilaterally. Toes are cool to touch. Feet are warm proximally.There is decreased digital hair. Skin is atrophic, hyperpigmented, and mildly edematous       Musculoskeletal:        Right ankle: She exhibits decreased range of motion and swelling. No tenderness. No lateral malleolus, no medial malleolus, no CF ligament, no head of 5th metatarsal and no proximal fibula tenderness found. Achilles tendon exhibits no pain and no defect.        Left ankle: She exhibits decreased range of motion and swelling. No tenderness. No lateral malleolus, no medial malleolus, no head of 5th metatarsal and no proximal fibula tenderness found. Achilles tendon exhibits no pain and no defect.        Right foot: There is tenderness (Plantar medial calcaneal tuber. mild pain to palpation " of 5th met hase and resistance to eversion). There is normal range of motion.        Left foot: There is tenderness (plantar calcaneal medial tuber. mild pain with palpation of 5th met base and eversion resistance. ). There is normal range of motion.   Biomechanical exam: Pain on palpation bilateral medial calcaneal tubercle at origin of plantar fascia. There is equinus deformity bilateral with decreased dorsiflexion at the ankle joint bilateral. No tenderness with compression of heel. Negative tinels sign. Gait analysis reveals excessive pronation through midstance and propulsion with early heel off. Shoes reveals lateral heel counter wear bilateral     Muscle strength is 5/5 in all groups bilaterally.    Decreased stride, station of gait.  apropulsive toe off.  Increased angle and base of gait.    Patient has hammertoes of digits 2-5 bilateral partially reducible without symptom today.    Decreased first MPJ range of motion both weightbearing and nonweightbearing, no crepitus observed the first MP joint,+ dorsal flag sign. Mild  bunion deformity is observed .    Decreased arch height noted b/l.    Neurological: A sensory deficit is present.   [place]jun 5.07 monofilamant testing is diminished Maciej feet. Sharp/dull sensation diminished Bilaterally.     Paresthesias, and hyperesthesia bilateral feet with no clearly identified trigger or source.     Skin: Skin is warm, dry and intact. No abrasion, no ecchymosis, no lesion and no rash noted. She is not diaphoretic. No cyanosis or erythema. No pallor. Nails show no clubbing.   Toenails 1-5 bilaterally neatly trimmed and painted thickened by 2-3 mm,     Interdigital Spaces clean, dry and without evidence of break in skin integrity    Small < 1cm fissure noted to plantar medial R heel. Stable without signs of infection. No erythema or drainage noted.    Psychiatric: She has a normal mood and affect. Her speech is normal.   Nursing note and vitals reviewed.             Assessment:       Encounter Diagnoses   Name Primary?    Uncontrolled type 2 diabetes with neuropathy Yes    Heel pain, bilateral     Pes planus, unspecified laterality     Fissure in skin of foot - Right Foot          Plan:       Yanni was seen today for foot pain.    Diagnoses and all orders for this visit:    Uncontrolled type 2 diabetes with neuropathy  -     MRI Lower Extremity W WO Contrast Right; Future    Heel pain, bilateral  -     MRI Lower Extremity W WO Contrast Right; Future    Pes planus, unspecified laterality  -     MRI Lower Extremity W WO Contrast Right; Future    Fissure in skin of foot - Right Foot  -     MRI Lower Extremity W WO Contrast Right; Future      I counseled the patient on her conditions, their implications and medical management.     - Shoe inspection. Diabetic Foot Education. Patient reminded of the importance of good nutrition and blood sugar control to help prevent podiatric complications of diabetes. Patient instructed on proper foot hygeine. We discussed wearing proper shoe gear, daily foot inspections, never walking without protective shoe gear, never putting sharp instruments to feet. A1c elevation may lay a role in prolonged PF    Discussed different treatment options for foot pain. I gave written and verbal instructions on stretching exercises. Patient expressed understanding. Discussed icing the affected area as needed and also wearing appropriate shoe gear and avoiding flats, slippers, sandals, and going barefoot. My recommendation for OTC supports is [employer]. Patient instructed on adequate icing techniques. Patient should ice the affected area at least once per day x 10 minutes for 10 days . I advised the patient that extra icing would also be beneficial to ensure adequate anti inflammatory effect. We also discussed cortisone injections and NSAID therapy. She would like to avoid injection today.     At this point patient requires surgical consult with  my colleague and MRI to evaluated soft tissue structure (xrays unremarkable).  I cannot continue to prescribe Roxicodone for plantar fasciitis.

## 2017-06-06 ENCOUNTER — DOCUMENTATION ONLY (OUTPATIENT)
Dept: REHABILITATION | Facility: HOSPITAL | Age: 47
End: 2017-06-06

## 2017-06-06 ENCOUNTER — TELEPHONE (OUTPATIENT)
Dept: FAMILY MEDICINE | Facility: CLINIC | Age: 47
End: 2017-06-06

## 2017-06-06 DIAGNOSIS — E11.9 DIABETES MELLITUS WITHOUT COMPLICATION: ICD-10-CM

## 2017-06-06 NOTE — TELEPHONE ENCOUNTER
----- Message from Bhavik Orellana sent at 6/5/2017  4:10 PM CDT -----  [facility]  Called regarding incomplete orders that were received for pt. Breanne can be reached  [phone].

## 2017-06-06 NOTE — PROGRESS NOTES
Name: Yanni Kaiser   [facility] Number: [id number]   Age: 46 y.o.   Diagnosis: Foot pain  Physician: Dr. Taylor  Original Orders : PT eval and treat  Initial visit: 5/26/2017  Date of Last visit: 5/26/2017  Date of Discharge Note:  6/6/2017  Total Visits Received: 1  Missed Visits: 1    Subjective: Pt no showed for today's visit. Per phone call, pt wishes to be discharged from therapy at this time. She has an MRI scheduled for her foot and says that she might need surgery.     Objective:see initial eval on 5/26/17 for status at time of last visit    Assessment:Goals not met as pt did not return for follow up care.    Discharge reason : Pt returned to MD for further medical care    Discharge plan :Continue HEP and Pt to follow-up with MD as planned    Plan:  This patient is discharged from Physical Therapy Services.     Alivia Trinh, PT  6/6/2017

## 2017-06-06 NOTE — TELEPHONE ENCOUNTER
Contacted Breanne, from shoe store re: diabetic shoes for pt. They will send us form to be filled out by Dr. Gordillo and request orders from Dr. Taylor. We will then send packet to [phone] attn: Breanne.

## 2017-06-06 NOTE — TELEPHONE ENCOUNTER
----- Message from Carlyle Gordillo MD sent at 6/6/2017  8:25 AM CDT -----  Please let patient know that labs are normal.    Thanks,  Dr. Gordillo

## 2017-06-07 ENCOUNTER — OUTPATIENT CASE MANAGEMENT (OUTPATIENT)
Dept: ADMINISTRATIVE | Facility: OTHER | Age: 47
End: 2017-06-07

## 2017-06-07 NOTE — PROGRESS NOTES
For your Information:    Please note the following patient has been assigned to Makenzie Delgado RN with [facility] for screening.    Reason:  High Risk    Diabetes mellitus without complication [E11.9]    [phone] questions.    Thank you    Mirela Encarnacion, SSC

## 2017-06-08 ENCOUNTER — OUTPATIENT CASE MANAGEMENT (OUTPATIENT)
Dept: ADMINISTRATIVE | Facility: OTHER | Age: 47
End: 2017-06-08

## 2017-06-08 NOTE — LETTER
June 8, 2017    Efrainmichelleivan Roon  [address] [facility] [address] [place] [id number]             [facility] [address] [place] [address] Dear Mrs. Kaiser:    I have enclosed the education materials we discussed during our recent phone conversation.       If you have any questions or concerns, please don't hesitate to call.  I can be reached at [phone].    Sincerely,        Makenzie Delgado RN

## 2017-06-08 NOTE — PATIENT INSTRUCTIONS
Know the Signs and Symptoms of Depression  Everyone feels down at times. The blues are a natural part of life. But an unhappy period thats intense or lasts for more than a couple of weeks can be a sign of depression. Depression is a serious illness. It can disrupt the lives of family and friends. If you know someone you think may be depressed, find out what you can do to help.    Recognizing signs of depression  People who are depressed may:  · Feel unhappy, sad, blue, down, or miserable nearly every day  · Feel helpless, hopeless, or worthless  · Lose interest in hobbies, friends, and activities that used to give pleasure  · Not sleep well or sleep too much  · Gain or lose weight  · Feel low on energy or constantly tired  · Have a hard time concentrating or making decisions  · Lose interest in sex  · Have physical symptoms, such as stomachaches, headaches, or backaches  Know the serious signals  Warning signals for suicide include:  · Threats or talk of suicide  · Statements such as I wont be a problem much longer or Nothing matters  · Giving away possessions or making a will or  arrangements  · Buying a gun or other weapon  · Sudden, unexplained cheerfulness or calm after a period of depression  If you notice any of these signs, get help right away. Call a health care professional, mental health clinic, or suicide hotline and ask what action to take. In an emergency, dont hesitate to call the police.  Resources:  · National Institutes of Mental Zrjvml695-732-7342uko.[facility].nih.gov  · National [place] on Mental Jvirgkb770-020-3768evt.tate.org   · Mental Health Demwtbr111-036-9905pqd.nmha.org  · National Suicide Azpfixg547-936-1029 (800-SUICIDE)  · National Suicide Prevention Vyhhanyd500-316-1665 (137-360-HXNP)www.wuicidepreventionlifeline.org   Date Last Reviewed: 2015  © 8874-9566 [employer]. [address] [place] [address], [place] [address]. All rights reserved. This information is not  intended as a substitute for professional medical care. Always follow your healthcare professional's instructions.        Depression: Tips to Help Yourself  As your health care providers help treat your depression, you can also help yourself. Keep in mind that your illness affects you emotionally, physically, mentally, and socially. So full recovery will take time. Take care of your body and your soul, and be patient with yourself as you get better.    Be with others  Dont isolate yourself--youll only feel worse. Try to be with other people. And take part in fun activities when you can. Go to a movie, Docstocgame, Voodoo service, or social event. Talk openly with people you can trust. And accept help when its offered.  Keep your perspective  · Depression can cloud your judgment. So wait until you feel better before making major life decisions, such as changing jobs, moving, or getting  or .  · This illness is not your fault. Dont blame yourself for your depression.  · Recovering from depression is a process. Dont be discouraged if it takes some time to feel better.  · Depression saps your energy and concentration. So you wont be able to do all the things you used to do. Set small goals and do what you can.  Take care of your body  People with depression often lose the desire to take care of themselves. That only makes their problems worse. During treatment and afterward, make a point to:  · Exercise. Its a great way to take care of your body. And studies have shown that exercise helps fight depression.  · Avoid drugs and alcohol. These may ease the pain in the short term. But theyll only make your problems worse in the long run.  · Get relief from stress. Ask your healthcare provider for relaxation exercises and techniques to help relieve stress.  · Eat right. A balanced and healthy diet helps keep your body healthy.  Date Last Reviewed: 1/19/2015  © 8354-5383 [employer] [address]  [place] [address]. All rights reserved. This information is not intended as a substitute for professional medical care. Always follow your healthcare professional's instructions.

## 2017-06-08 NOTE — PROGRESS NOTES
"Chart reviewed. Pt known to this RNCM. Initial assessment completed. Pt states she is depressed and sad about all the medications she is taking and her health problems. Pt states she is no longer monitoring her blood sugars because "I just don't care anymore". Pt is taking Effexor for depression. PHQ 9 score of 19. Will notify Dr. Gordillo. Pt states she has thoughts that she would be better off dead but does not have a suicide plan and states she will not do anything to harm herself. Provided pt with the phone number for the crisis line and for Cassie Rockweiler, LCSW. Will refer pt to \[facility]\"" LCSW. Pt agrees to schedule appt for counseling within the next week. Pt agrees to monitor her blood sugar 2 x/day for the next week. Will education materials to pt.   "

## 2017-06-09 ENCOUNTER — OUTPATIENT CASE MANAGEMENT (OUTPATIENT)
Dept: ADMINISTRATIVE | Facility: OTHER | Age: 47
End: 2017-06-09

## 2017-06-09 NOTE — PROGRESS NOTES
Please note an Outpatient Complex Care Management welcome packet and consent form was created and mailed to the patient on 6-9-17    Please contact [facility] at [phone]. [phone] with any questions.    Thank you,      Mirela Encarnacion, SSC

## 2017-06-09 NOTE — LETTER
June 9, 2017    Yanni Kaiser  [address] [facility] [address] [place] [phone]             Outpatient Case Management  [facility] [facility] [address] [address] Dear Yanni Kaiser:    We understand that receiving many services from different doctors and healthcare providers is overwhelming. There are appointments to make, transportation to arrange, dietary instructions to understand, and new medications to obtain.    This is where [facility] can help.     You are eligible to receive Outpatient Case Management services when you have healthcare needs that require the coordination of many providers, treatments, and services. You also qualify if you need assistance with a new treatment plan.     There is no charge for this support. You may have been referred to this program from your doctor(s), hospital staff member(s), or insurance company but you always have a choice to participate or not participate. To participate, you must give us your permission to be enrolled.     When you are enrolled in the [facility] program, the  who is assigned to you is                                               Makenzie Delgado, RN                                                [phone]      Depending on your needs and wishes, your  may speak with you by phone, visit you at your place of living (for example your home, skilled nursing facility, or rehabilitation facility), or meet you at your doctors office.     Your  will tell you why you have been selected to participate in the program and will complete an assessment of your needs. Then a personalized plan of care will be developed with you and or your caregiver.             Here are examples of the services your [facility]  provides.     Coordinate communication among multiple providers.   Arrange for transportation, doctors visits, durable medical equipment, home  care services, and special clinics.    Provide coaching on how to manage your health condition.    Answer questions about your health condition.   Help you understand your doctors treatment plan.    Provide additional instruction about your health condition, treatments, and medications.    Help you obtain information about your insurance coverage.    Advocate for your individual needs.    Request a Licensed Clinical  (LCSW) to visit you if you need their services. LCSWs help with long term planning (discussing placement options, advanced planning directives), financial planning, and assistance (for example rent, utilities, medication funding).     Your  will coordinate their activities with other outpatient services you are receiving. All services provided by [facility]  are coordinated with and communicated to your primary care physician.    Our goal is to help you manage your health condition(s) safely within your living environment, whether that is your home or a medical facility. We want to help you function at the healthiest and highest level possible.     Sincerely,      Martin Morales MD  Medical Director    Enclosures:    Frequently Asked Questions  Patient Rights and Responsibilities   Reporting a Grievance or Complaint  Consent/Release of Information  Stamped Addressed Envelope                  Frequently Asked Questions about [facility]    What is [facility]?  Outpatient Case management is not Home healthcare services. [facility]  do not provide hands-on care. [facility]  will work with your doctor to arrange for home health services, if needed. Home health services have a limited duration and there are some restrictions as to who can get these services. There is no prescribed limit to the amount of time you receive [facility] services.  [facility]  are not agents of your insurance company. However, [facility]  can help you obtain information from your insurance company.     Who are the [facility] ?  [facility]  are Registered Nurses and Social Workers. It is important to remember that you and your  are a team that works together with your primary care physician to create your individualized plan of care. The ultimate goal of your care plan is to help you implement your doctors treatment plan and to help you function at the highest level of health possible.     What are my rights as a patient?  It is important for you to know and understand your rights and responsibilities while receiving services from the [facility] program. We have enclosed a complete description of your rights and responsibilities. You can help to make your care more effective when you understand your right and responsibilities.     What is needed to be enrolled in the program?  You are only enrolled in the [facility] Program when you give us your consent to participate. You will find enclosed a consent form. You are receiving this letter because you or your caregivers have given us a verbal consent to enroll you in [facility] Management Program. We ask that you sign and return the enclosed written consent in the stamped self-addressed envelope.                           Patient Rights and Responsibilities    We consider you a partner in your care. When you are well informed, participate in treatment decisions and communicate openly with your doctor and other healthcare professionals, you help make your care more effective.     While you are in the Outpatient Case Management Program, your rights include the following:     You have a right to be provided services in a non-discriminatory manner in accordance with the  provisions of Title VI of the Civil Rights Act of 1964, Section 504 of the Rehabilitation Act of 1973, the Age Discrimination Act of 1975, the Americans with Disabilities Act as well as any other applicable Federal and State laws and regulations.     You have the right to a reasonable, timely response to your request or need for care, as well as the right to considerate and respectful care including an environment that preserves dignity and contributes to a positive self-image. You are responsible for being considerate and respectful of our staff.     You have a right to information regarding patient rights, advocacy services and complaint mechanisms, and the right to prompt resolution of any complaint. You or a designee has the right to participate in the resolution of ethical issues surrounding your care. You have a right to file a complaint if you feel that your rights have been infringed, without fear or penalty from [facility] or the federal government. You may file a complaint with the Director of Outpatient Case Management by calling [phone]. At any time, you may lodge a grievance with the [facility]als by calling [phone], or the Joint Commission on Accreditation of Healthcare Organizations at [phone].     You, or someone acting on your behalf, have the right to understandable information on your health status, treatment and progress in order to make decisions. You have the right to know the nature, risks and alternatives to treatment. You have the right to be informed, when appropriate, regarding the outcome of the care that has been provided. You have the right to refuse treatment to the extent permitted by law, and the right to be informed of the alternatives and consequences of refusing treatment.     You, in collaboration with your physician, have the right to make decisions regarding care and the right to participate in the development and implementation of  the plan of care and effective pain management. You have the right to know the name and professional status of those responsible for the delivery of your care and treatment.       You have a right, within legal guidelines, to have a guardian, next-of-kin or legal designee exercises your patient rights when you are unable to do so. You have the right for your wishes regarding end-of-life decisions to be addressed by the healthcare team through advance directives. You have the right to personal privacy and confidentiality and to expect confidentiality of all records and communications pertaining to your care.      You have the right to receive communications about your health information confidentially. You have the right to request restrictions on the uses and disclosures of your health information. You have the right to inspect, copy, request amendments and receive an accounting of to whom we have disclosed your health information.     You have the right to be provided with interpretation services if you do not speak English; to alternative communication techniques if you are hearing or vision impaired; and to have any other resources needed on your behalf to ensure effective communication. These services are provided free of charge.     You have a right to personal safety (free from mental, physical, sexual and verbal abuse, neglect and exploitation). You have the right to access protective and advocacy services.     Advance Directives  A Patient Advocate is available to meet with patients to answer questions regarding advance directives.    Living Will  A document that outlines what medical treatment the patient does or does not want in the event the patient becomes unable to make those decisions at the appropriate time.    Durable Medical Power of   A document by which the patient designates an individual to be responsible for making medical decisions in the event the patient becomes unable to do  so.    HIPAA Notice of Privacy Practices  Your medical information is governed by federal privacy laws. HIPAA protects private medical information and how that information is disclosed. If you have a question regarding the HIPAA Notice of Privacy Practices, or if you believe your privacy rights have been violated, you may call our designated hotline at [phone].            Quality Improvement  Because we consistently strive to improve the care and service provided to our patients, we welcome your feedback. Your comments are an important part of our quality improvement process, as we like to know what we are doing right and which areas are in need of improvement. Our policy is to listen, be responsive and provide you with an appropriate and timely follow-up to your questions or concerns. Our goal is active patient and family involvement in all aspects of the care process.                                                                                  Reporting a Grievance or Complaint    During your time with the [facility] team you may have a grievance or complaint with our services. Your Patients Bill of Rights gives patients, families, and caregivers the right to express concerns and grievances and the right to expect a reasonable and timely response.     Your presentation of your concerns is not viewed negatively. It is an opportunity for us to improve the quality of our care and services we provide to you.     You may report your concerns directly to your , or you can phone in a complaint to:     Director of Outpatient Case Management  [phone]    You may also send a complaint letter to:    Director of Outpatient Case Management Services  [address] [place] [id number]    Tell us the details of your complaint and provide us with a contact phone number so we can contact you to obtain additional information. We will return a call to you within two  business days of our receipt of your complaint, and to request additional information as needed. If you choose to mail a letter, your complaint may take a few days longer to reach us.     All grievances will be addressed as quickly as possible. A grievance or complaint that involves situations or practices which place patients in immediate danger will be addressed as an urgent matter. We will work to resolve all other complaints within seven days of receipt. By that time, you will receive a phone call with either the resolution of your complaint, or a plan for corrective action. A formal written response will be sent to you within 30 days of receipt of your grievance.     If a resolution cannot be completed within 30 days, a letter will be sent to you or your family member with an estimated time for the final response.    Additionally, all patients have the right to file complaints with external agencies, without exception. Complaints/grievances can be addressed to the following agencies:            Patient Safety or Quality of Care Concerns  Office of Quality Monitoring   [place] [address]  [phone] Toll Free    HIPPA Privacy/Security Concerns  Office for Civil Rights Region IV  [place] Department of Health & Human Services  [address] [facility], [address]  [place], [place] [address] [phone] Phone  [phone] TDD  [phone] Toll Free    Medicare/Medicaid Billing Concerns  [place] for Medicare & Medicaid Services  Region [address]  [address] [place] [address], [address]  [place] [address] [phone] Phone  [phone] Toll Free    General Concerns  [facility] and [facility] in the [place] ([facility])  [phone] Toll Free Complaint Hotline                                                                Consent Form/Release of Information    By signing--     (1) I agree I have read the Outpatient Case Management information provided to me;     (2) I agree to  voluntarily participate in the Outpatient Case Management program;     (3) I understand I must consent to participation in the Outpatient Case Management program during my first interview with my ;    (4) I consent to the discussion and release of my personal health information to my healthcare team (including my personal physician, my medical home care team, any specialty physician(s), and my [facility] team);     (5) I agree my consent is valid for the length of time I am receiving Outpatient Case Management;    (6) I agree to referrals to community resources which my Case Management team recommends for me. I agree to the release of my personal information and personal health information as necessary to referral sources.    ___________________________________________________________________  Patients Printed Name     ___________________________________________________________________  Patients Signature       Date    If patient is in being cared for, please complete this section:     ___________________________________________________________________  Printed Name of Person Caring For Patient   Relationship To Patient    ___________________________________________________________________   Signature of Person Caring For Patient     Date    PLEASE SIGN AND RETURN IN THE ENCLOSED PRE-ADDRESSED ENVELOPE.

## 2017-06-12 ENCOUNTER — TELEPHONE (OUTPATIENT)
Dept: ADMINISTRATIVE | Facility: HOSPITAL | Age: 47
End: 2017-06-12

## 2017-06-12 NOTE — TELEPHONE ENCOUNTER
----- Message from Mirela Encarnacion MA sent at 6/7/2017 12:27 PM CDT -----  For your Information:    Please note the following patient has been assigned to Makenzie Delgado RN with Outpatient Complex Care Mgmt for screening.    Reason:  High Risk    Diabetes mellitus without complication [E11.9]    Please contact [facility] at ext [phone] with questions.    Thank you    ROBERTO Vargas

## 2017-06-13 ENCOUNTER — OUTPATIENT CASE MANAGEMENT (OUTPATIENT)
Dept: ADMINISTRATIVE | Facility: OTHER | Age: 47
End: 2017-06-13

## 2017-06-13 NOTE — PROGRESS NOTES
Chart reviewed.  Patient referred by [facility] Makenzie Delgado for psychosocial support.  Patient scored 19 on the PHQ9 assessment completed by Ms. Sandy with patient on 6/8/17.  Patient states that she has thought about dying in the past two weeks but she does not have an active plan.  Patient has hx of attempted suicide x3, with the last episode being approximately 5 years ago.  Patient gave a verbal contract that she would not harm herself.  She denies H/I.  Patient said she takes Effexor but has not noticed that it is helpful.  She said she estelita through prayer. [facility].  Patient was encouraged to participate in all mental/medical health appointments. She was also give the [employer] Crisis Intervention Hotline telephone number. Patient encouraged to call 911 if she believes she is going to harm herself or others.  Support group information mailed to patient.  Patient states that she lives with her adult daughter and her minor grandchildren.  She said her daughter is on maternity leave presently and able to provide transportation and assist with patient's needs.  Patient reports financial difficulty with affording utilities.  Community resources provided telephonically and mailed to patient.  [employer] literature on depression also mailed.  Patient states that she will contact [facility] to register at the number provided.  [facility] via [employer] mail.  LCSW will continue to follow.

## 2017-06-15 DIAGNOSIS — F32.A DEPRESSION, UNSPECIFIED DEPRESSION TYPE: Primary | ICD-10-CM

## 2017-06-15 NOTE — PROGRESS NOTES
Per case management request, a referral to psychiatry has been placed due to patients increased depression.  Patient was referred to [facility] at last office visit and was given contact information as to how to schedule an appointment.  Referral to Dr. De Paz has been placed as she is on numerous medications that could potentially react with one another.    Carlyle Gordillo MD

## 2017-06-20 ENCOUNTER — TELEPHONE (OUTPATIENT)
Dept: PODIATRY | Facility: CLINIC | Age: 47
End: 2017-06-20

## 2017-06-20 ENCOUNTER — HOSPITAL ENCOUNTER (OUTPATIENT)
Dept: RADIOLOGY | Facility: HOSPITAL | Age: 47
Discharge: HOME | End: 2017-06-20
Attending: PODIATRIST
Payer: MEDICARE

## 2017-06-20 DIAGNOSIS — M79.671 HEEL PAIN, BILATERAL: ICD-10-CM

## 2017-06-20 DIAGNOSIS — M21.40 PES PLANUS, UNSPECIFIED LATERALITY: ICD-10-CM

## 2017-06-20 DIAGNOSIS — M79.672 HEEL PAIN, BILATERAL: ICD-10-CM

## 2017-06-20 DIAGNOSIS — R23.4 FISSURE IN SKIN OF FOOT: ICD-10-CM

## 2017-06-20 PROCEDURE — 73718 MRI LOWER EXTREMITY W/O DYE: CPT | Mod: TC,RT

## 2017-06-20 PROCEDURE — 73718 MRI LOWER EXTREMITY W/O DYE: CPT | Mod: 26,RT,, | Performed by: RADIOLOGY

## 2017-06-21 ENCOUNTER — OFFICE VISIT (OUTPATIENT)
Dept: FAMILY MEDICINE | Facility: CLINIC | Age: 47
End: 2017-06-21
Payer: MEDICARE

## 2017-06-21 ENCOUNTER — OUTPATIENT CASE MANAGEMENT (OUTPATIENT)
Dept: ADMINISTRATIVE | Facility: OTHER | Age: 47
End: 2017-06-21

## 2017-06-21 ENCOUNTER — OFFICE VISIT (OUTPATIENT)
Dept: PODIATRY | Facility: CLINIC | Age: 47
End: 2017-06-21
Payer: MEDICARE

## 2017-06-21 VITALS
OXYGEN SATURATION: 97 % | WEIGHT: 293 LBS | HEART RATE: 76 BPM | HEIGHT: 64 IN | TEMPERATURE: 99 F | DIASTOLIC BLOOD PRESSURE: 86 MMHG | RESPIRATION RATE: 18 BRPM | BODY MASS INDEX: 50.02 KG/M2 | SYSTOLIC BLOOD PRESSURE: 124 MMHG

## 2017-06-21 VITALS — HEIGHT: 64 IN | BODY MASS INDEX: 50.02 KG/M2 | WEIGHT: 293 LBS

## 2017-06-21 DIAGNOSIS — M54.50 CHRONIC BILATERAL LOW BACK PAIN WITHOUT SCIATICA: Primary | ICD-10-CM

## 2017-06-21 DIAGNOSIS — M79.671 CHRONIC HEEL PAIN, RIGHT: ICD-10-CM

## 2017-06-21 DIAGNOSIS — M72.2 PLANTAR FASCIITIS: Primary | ICD-10-CM

## 2017-06-21 DIAGNOSIS — G89.29 CHRONIC HEEL PAIN, RIGHT: ICD-10-CM

## 2017-06-21 DIAGNOSIS — N18.30 TYPE 2 DIABETES MELLITUS WITH STAGE 3 CHRONIC KIDNEY DISEASE, WITHOUT LONG-TERM CURRENT USE OF INSULIN: ICD-10-CM

## 2017-06-21 DIAGNOSIS — M72.2 PLANTAR FASCIITIS, BILATERAL: ICD-10-CM

## 2017-06-21 DIAGNOSIS — F33.3 SEVERE EPISODE OF RECURRENT MAJOR DEPRESSIVE DISORDER, WITH PSYCHOTIC FEATURES: ICD-10-CM

## 2017-06-21 DIAGNOSIS — G89.29 CHRONIC BILATERAL LOW BACK PAIN WITHOUT SCIATICA: Primary | ICD-10-CM

## 2017-06-21 DIAGNOSIS — M62.9 NONTRAUMATIC TEAR OF PLANTAR FASCIA: ICD-10-CM

## 2017-06-21 DIAGNOSIS — E11.22 TYPE 2 DIABETES MELLITUS WITH STAGE 3 CHRONIC KIDNEY DISEASE, WITHOUT LONG-TERM CURRENT USE OF INSULIN: ICD-10-CM

## 2017-06-21 PROCEDURE — 3046F HEMOGLOBIN A1C LEVEL >9.0%: CPT | Mod: S$GLB,,, | Performed by: FAMILY MEDICINE

## 2017-06-21 PROCEDURE — 99215 OFFICE O/P EST HI 40 MIN: CPT | Mod: S$GLB,,, | Performed by: FAMILY MEDICINE

## 2017-06-21 PROCEDURE — 4010F ACE/ARB THERAPY RXD/TAKEN: CPT | Mod: S$GLB,,, | Performed by: PODIATRIST

## 2017-06-21 PROCEDURE — 2022F DILAT RTA XM EVC RTNOPTHY: CPT | Mod: S$GLB,,, | Performed by: FAMILY MEDICINE

## 2017-06-21 PROCEDURE — 99499 UNLISTED E&M SERVICE: CPT | Mod: S$GLB,,, | Performed by: FAMILY MEDICINE

## 2017-06-21 PROCEDURE — 99999 PR PBB SHADOW E&M-EST. PATIENT-LVL IV: CPT | Mod: PBBFAC,,, | Performed by: FAMILY MEDICINE

## 2017-06-21 PROCEDURE — 99499 UNLISTED E&M SERVICE: CPT | Mod: S$GLB,,, | Performed by: PODIATRIST

## 2017-06-21 PROCEDURE — 3046F HEMOGLOBIN A1C LEVEL >9.0%: CPT | Mod: S$GLB,,, | Performed by: PODIATRIST

## 2017-06-21 PROCEDURE — 99999 PR PBB SHADOW E&M-EST. PATIENT-LVL II: CPT | Mod: PBBFAC,,, | Performed by: PODIATRIST

## 2017-06-21 PROCEDURE — 4010F ACE/ARB THERAPY RXD/TAKEN: CPT | Mod: S$GLB,,, | Performed by: FAMILY MEDICINE

## 2017-06-21 PROCEDURE — 99213 OFFICE O/P EST LOW 20 MIN: CPT | Mod: S$GLB,,, | Performed by: PODIATRIST

## 2017-06-21 RX ORDER — QUETIAPINE FUMARATE 25 MG/1
25 TABLET, FILM COATED ORAL NIGHTLY
Qty: 30 TABLET | Refills: 0 | Status: SHIPPED | OUTPATIENT
Start: 2017-06-21 | End: 2018-09-10 | Stop reason: SDUPTHER

## 2017-06-21 RX ORDER — HYDROXYZINE PAMOATE 50 MG/1
CAPSULE ORAL
Refills: 1 | Status: ON HOLD | COMMUNITY
Start: 2017-06-05 | End: 2017-07-10 | Stop reason: HOSPADM

## 2017-06-21 NOTE — PROGRESS NOTES
Routine Office Visit    Patient Name: Yanni Kaiser    : 1970  MRN: [id number]    Subjective:  Yanni is a 46 y.o. female who presents today for:    1. Foot and back pain  Patient presenting with chronic lower back pain and bilateral foot pain.  She has been seen by podiatry and has been recommended to either see pain management or have surgery (per patient reporting).  She states that the pain has been severe.  She has has not had any weakness in the lower extremities.  There has been no incontinence or loss of bowel control.  Patient is morbidly obese and states she does know that her weight does contribute to her pain.  She does suffer with diabetes and would like to see pain management first before having surgery as she is afraid of getting infections in her feet after having the surgery.    2.  Depression  Patient presenting today with complaints of severe depression.  I was contacted also by case management who had concerns as patient had reported having thoughts of harming herself.  Patient and her  report her being admitted to a psychiatric hospital in the past due to depression and suicidal thoughts.  Patient reports being on multiple different antidepressants in the past.  She states that she doesn't remember what effects each had, but that the Effexor she is on now has helped control her hallucinations.  She states that when she is not taking the medication she has visual hallucinations.  She states that her depression got so bad last week that she almost checked herself into [facility].  Patient is scheduled to see [facility] on 2017 and states she will be fine until then.  She states that she doesn't feel like doing anything.      Past Medical History  Past Medical History:   Diagnosis Date    Allergy     Asthma     Diabetes mellitus, type 2     GERD (gastroesophageal reflux disease)     High cholesterol     Hypertension     Pseudotumor cerebri      Seizures     Sleep apnea        Past Surgical History  Past Surgical History:   Procedure Laterality Date     SECTION      CHOLECYSTECTOMY      SINUS SURGERY         Family History  Family History   Problem Relation Age of Onset    Cancer Mother     Hypertension Mother     Diabetes Mother     Stroke Father     Heart disease Father     COPD Father     Heart attack Father     Cancer Maternal Grandmother        Social History  Social History     Social History    Marital status:      Spouse name: N/A    Number of children: N/A    Years of education: N/A     Occupational History    Not on file.     Social History Main Topics    Smoking status: Current Every Day Smoker     Packs/day: 1.00     Years: 15.00     Types: Cigarettes     Last attempt to quit: 6/10/2015    Smokeless tobacco: Never Used    Alcohol use No    Drug use: No    Sexual activity: Yes     Partners: Male     Birth control/ protection: None     Other Topics Concern    Not on file     Social History Narrative    No narrative on file       Current Medications  Current Outpatient Prescriptions on File Prior to Visit   Medication Sig Dispense Refill    ACCU-CHEK ELVIA PLUS METER Misc TEST FOUR TIMES DAILY BEFORE MEALS AND EVERY NIGHT 90 each 1    acetaZOLAMIDE (DIAMOX) 500 mg CpSR Take 500 mg by mouth 2 (two) times daily.      albuterol (ACCUNEB) 1.25 mg/3 mL Nebu Take 3 mLs (1.25 mg total) by nebulization every 6 (six) hours as needed. Rescue 100 mL 0    albuterol 90 mcg/actuation inhaler Inhale 2 puffs into the lungs every 6 (six) hours as needed for Wheezing. Rescue 18 g 0    albuterol-ipratropium 2.5mg-0.5mg/3mL (DUO-NEB) 0.5 mg-3 mg(2.5 mg base)/3 mL nebulizer solution Take 3 mLs by nebulization every 6 (six) hours as needed for Wheezing or Shortness of Breath. Rescue 100 mL 0    atorvastatin (LIPITOR) 20 MG tablet Take 1 tablet (20 mg total) by mouth once daily. 90 tablet 1    blood sugar diagnostic Strp 1  each by Misc.(Non-Drug; Combo Route) route 4 (four) times daily before meals and nightly. ACCU CHEK ELVIA PLUS METER 200 each 3    capsaicin 0.1 % Crea Apply 1 application topically 2 (two) times daily. 56.6 g 1    EPIPEN 2-RHODA 0.3 mg/0.3 mL (1:1,000) AtIn   1    fluticasone (FLONASE) 50 mcg/actuation nasal spray 1 spray by Each Nare route daily as needed. 16 g 1    fluticasone-vilanterol (BREO ELLIPTA) 200-25 mcg/dose DsDv diskus inhaler Inhale 1 puff into the lungs once daily. 1 each 3    gabapentin (NEURONTIN) 300 MG capsule Take 2 capsules (600 mg total) by mouth every evening. 180 capsule 11    hydrocortisone 2.5 % cream Apply topically 2 (two) times daily. 20 g 0    hydrOXYzine pamoate (VISTARIL) 50 MG Cap Take 1 capsule (50 mg total) by mouth every 8 (eight) hours as needed (itching). 30 capsule 1    hydrOXYzine pamoate (VISTARIL) 50 MG Cap   1    ibuprofen (ADVIL,MOTRIN) 600 MG tablet Take 1 tablet (600 mg total) by mouth every 8 (eight) hours as needed for Pain. 15 tablet 0    lancets (ACCU-CHEK SOFTCLIX LANCETS) Misc 1 Device by Misc.(Non-Drug; Combo Route) route 4 (four) times daily. 200 each 3    lancets 25 gauge Misc 1 lancet by Misc.(Non-Drug; Combo Route) route 4 (four) times daily before meals and nightly. 200 each 11    LINZESS 290 mcg Cap TK ONE CAPSULE PO D ON AN EMPTY STOMACH  5    losartan (COZAAR) 100 MG tablet Take 1 tablet (100 mg total) by mouth once daily. 90 tablet 3    metformin (GLUCOPHAGE-XR) 500 MG 24 hr tablet Take 2 tablets (1,000 mg total) by mouth 2 (two) times daily with meals. 360 tablet 0    montelukast (SINGULAIR) 10 mg tablet   1    pantoprazole (PROTONIX) 40 MG tablet once daily.   0    propranolol (INDERAL) 20 MG tablet Take 1 tablet (20 mg total) by mouth 2 (two) times daily. 60 tablet 0    topiramate (TOPAMAX) 200 MG Tab Take 1 tablet (200 mg total) by mouth 2 (two) times daily. 60 tablet 11    venlafaxine (EFFEXOR-XR) 75 MG 24 hr capsule Take 75 mg by  "mouth once daily.      furosemide (LASIX) 20 MG tablet Take 1 tablet (20 mg total) by mouth once daily. 5 tablet 0    nystatin-triamcinolone (MYCOLOG) ointment Apply topically 2 (two) times daily. 60 g 2    TAZTIA  mg CpSR TK ONE CAPSULE PO D  2     No current facility-administered medications on file prior to visit.        Allergies   Review of patient's allergies indicates:  No Known Allergies    Review of Systems (Pertinent positives)  Review of Systems   Constitutional: Negative.    HENT: Negative.    Eyes: Negative.    Respiratory: Negative.    Cardiovascular: Negative.    Gastrointestinal: Negative.    Musculoskeletal: Positive for back pain and myalgias.   Skin: Negative.    Psychiatric/Behavioral: Positive for depression. Negative for suicidal ideas.         /86 (BP Location: Left arm, Patient Position: Sitting, BP Method: Manual)   Pulse 76   Temp 98.6 °F (37 °C) (Oral)   Resp 18   Ht 5' 4" (1.626 m)   Wt (!) 160.9 kg (354 lb 11.5 oz)   SpO2 97%   BMI 60.89 kg/m²     GENERAL APPEARANCE: in no apparent distress and well developed and well nourished  HEENT: PERRL, EOMI, Sclera clear, anicteric, Oropharynx clear, no lesions, Neck supple with midline trachea  NECK: normal, supple, no adenopathy, thyroid normal in size  RESPIRATORY: appears well, vitals normal, no respiratory distress, acyanotic, normal RR, chest clear, no wheezing, crepitations, rhonchi, normal symmetric air entry  HEART: regular rate and rhythm, S1, S2 normal, no murmur, click, rub or gallop.    ABDOMEN: abdomen is soft without tenderness, no masses, no hernias, no organomegaly, no rebound, no guarding. Suprapubic tenderness absent. No CVA tenderness.  MS:  Pain on palpation across lower back, but not on palpation of spinous processes; pain on palpation of bilateral plantar surfaces  SKIN: no rashes, no wounds, no other lesions  PSYCH: Alert, oriented x 3, thought content appropriate, speech normal, pleasant and " cooperative, good eye contact, well groomed, no SI/HI at this time    Assessment/Plan:  Yanni Kaiser is a 46 y.o. female who presents today for :    Yanni was seen today for pre-op exam and back pain.    Diagnoses and all orders for this visit:    Chronic bilateral low back pain without sciatica  -     Ambulatory Referral to Pain Clinic    Plantar fasciitis, bilateral  -     Ambulatory Referral to Pain Clinic    Severe episode of recurrent major depressive disorder, with psychotic features  -     quetiapine (SEROQUEL) 25 MG Tab; Take 1 tablet (25 mg total) by mouth every evening.    Type 2 diabetes mellitus with stage 3 chronic kidney disease, without long-term current use of insulin  -     Hemoglobin A1c; Future      1.  Patient referred to pain management and will hold on surgery at this time  2.  Patient started on seroquel for depression/hallucinations  3.  She has a referral to see Dr. De Paz and is to call and schedule appointment as soon as possible  4.  She is to keep appointment with Cassandra Rockweiler, LCSW on July 5, 2017   5.  Patient to go to the ED or call 911 should she begin to have SI or HI again  6.  She is to contineu Effexor as prescribed  7.  Patient to have a1c done on Monday and follow up with endocrine as scheduled      Was with patient for one hour today all of which was face to face.    Carlyle Gordillo MD

## 2017-06-21 NOTE — PROGRESS NOTES
Subjective:      Patient ID: Yanni Kaiser is a 46 y.o. female.    Chief Complaint: Follow-up (bilateral / pt states MRI was done on right foot only ) and Foot Pain    Yanni is a 46 y.o. female who RTC for follow up of bilateral heel pain R>L. Here for further assessment, treatment and to discuss MRI results. Continues to have 8-10/10 pain in the R heel. Left heel now starting to bother her. Has been through PT for several weeks as well as a steroid injection in the past both of which helped temporarily. Relates pain continuous to the R heel and worse when she walks on it. Also relates swelling. Has not gotten DM shoes yet.      This patient has documented high risk feet requiring routine maintenance secondary to diabetes mellitis and those secondary complications of diabetes, as mentioned..    PCP: Carlyle Gordillo MD    Date Last Seen by PCP:   Chief Complaint   Patient presents with    Follow-up     bilateral / pt states MRI was done on right foot only     Foot Pain     Current shoe gear:  miCab    Hemoglobin A1C   Date Value Ref Range Status   02/21/2017 10.3 (H) 4.5 - 6.2 % Final     Comment:     According to ADA guidelines, hemoglobin A1C <7.0% represents  optimal control in non-pregnant diabetic patients.  Different  metrics may apply to specific populations.   Standards of Medical Care in Diabetes - 2016.  For the purpose of screening for the presence of diabetes:  <5.7%     Consistent with the absence of diabetes  5.7-6.4%  Consistent with increasing risk for diabetes   (prediabetes)  >or=6.5%  Consistent with diabetes  Currently no consensus exists for use of hemoglobin A1C  for diagnosis of diabetes for children.     01/11/2017 6.7 (H) 4.5 - 6.2 % Final     Comment:     According to ADA guidelines, hemoglobin A1C <7.0% represents  optimal control in non-pregnant diabetic patients.  Different  metrics may apply to specific populations.   Standards of Medical Care in Diabetes - 2016.  For  the purpose of screening for the presence of diabetes:  <5.7%     Consistent with the absence of diabetes  5.7-6.4%  Consistent with increasing risk for diabetes   (prediabetes)  >or=6.5%  Consistent with diabetes  Currently no consensus exists for use of hemoglobin A1C  for diagnosis of diabetes for children.     10/17/2016 6.6 (H) 4.5 - 6.2 % Final     Comment:     According to ADA guidelines, hemoglobin A1C <7.0% represents  optimal control in non-pregnant diabetic patients.  Different  metrics may apply to specific populations.   Standards of Medical Care in Diabetes - 2016.  For the purpose of screening for the presence of diabetes:  <5.7%     Consistent with the absence of diabetes  5.7-6.4%  Consistent with increasing risk for diabetes   (prediabetes)  >or=6.5%  Consistent with diabetes  Currently no consensus exists for use of hemoglobin A1C  for diagnosis of diabetes for children.       Patient Active Problem List   Diagnosis    Body mass index 60.0-69.9, adult    Mild intermittent asthma    SHARATH (obstructive sleep apnea)    Leg varices    Low back pain    Seizure disorder    Type 2 diabetes mellitus, controlled    Migraine    Morbid obesity with BMI of 60.0-69.9, adult    Tobacco abuse    Anemia of chronic disease    Mild protein malnutrition    Weakness    Allergic rhinitis    Idiopathic intracranial hypertension    Essential hypertension    Combined hyperlipidemia associated with type 2 diabetes mellitus    Depression    Seizure    Hyperlipidemia    GERD (gastroesophageal reflux disease)    Epilepsy    Plantar fasciitis, bilateral    Papilledema associated with increased intracranial pressure    DUSTIN (acute kidney injury)    Numbness of right hand    Type 2 diabetes mellitus with stage 3 chronic kidney disease, without long-term current use of insulin     Current Outpatient Prescriptions on File Prior to Visit   Medication Sig Dispense Refill    ACCU-CHEK ELVIA PLUS METER [facility]  TEST FOUR TIMES DAILY BEFORE MEALS AND EVERY NIGHT 90 each 1    acetaZOLAMIDE (DIAMOX) 500 mg CpSR Take 500 mg by mouth 2 (two) times daily.      albuterol (ACCUNEB) 1.25 mg/3 mL Nebu Take 3 mLs (1.25 mg total) by nebulization every 6 (six) hours as needed. Rescue 100 mL 0    albuterol 90 mcg/actuation inhaler Inhale 2 puffs into the lungs every 6 (six) hours as needed for Wheezing. Rescue 18 g 0    albuterol-ipratropium 2.5mg-0.5mg/3mL (DUO-NEB) 0.5 mg-3 mg(2.5 mg base)/3 mL nebulizer solution Take 3 mLs by nebulization every 6 (six) hours as needed for Wheezing or Shortness of Breath. Rescue 100 mL 0    atorvastatin (LIPITOR) 20 MG tablet Take 1 tablet (20 mg total) by mouth once daily. 90 tablet 1    blood sugar diagnostic Strp 1 each by Misc.(Non-Drug; Combo Route) route 4 (four) times daily before meals and nightly. ACCU CHEK ELVIA PLUS METER 200 each 3    capsaicin 0.1 % Crea Apply 1 application topically 2 (two) times daily. 56.6 g 1    EPIPEN 2-RHODA 0.3 mg/0.3 mL (1:1,000) AtIn   1    fluticasone (FLONASE) 50 mcg/actuation nasal spray 1 spray by Each Nare route daily as needed. 16 g 1    fluticasone-vilanterol (BREO ELLIPTA) 200-25 mcg/dose DsDv diskus inhaler Inhale 1 puff into the lungs once daily. 1 each 3    furosemide (LASIX) 20 MG tablet Take 1 tablet (20 mg total) by mouth once daily. 5 tablet 0    gabapentin (NEURONTIN) 300 MG capsule Take 2 capsules (600 mg total) by mouth every evening. 180 capsule 11    hydrocortisone 2.5 % cream Apply topically 2 (two) times daily. 20 g 0    hydrOXYzine pamoate (VISTARIL) 50 MG Cap Take 1 capsule (50 mg total) by mouth every 8 (eight) hours as needed (itching). 30 capsule 1    ibuprofen (ADVIL,MOTRIN) 600 MG tablet Take 1 tablet (600 mg total) by mouth every 8 (eight) hours as needed for Pain. 15 tablet 0    lancets (ACCU-CHEK SOFTCLIX LANCETS) Misc 1 Device by Misc.(Non-Drug; Combo Route) route 4 (four) times daily. 200 each 3    lancets 25 gauge  Misc 1 lancet by Misc.(Non-Drug; Combo Route) route 4 (four) times daily before meals and nightly. 200 each 11    LINZESS 290 mcg Cap TK ONE CAPSULE PO D ON AN EMPTY STOMACH  5    losartan (COZAAR) 100 MG tablet Take 1 tablet (100 mg total) by mouth once daily. 90 tablet 3    metformin (GLUCOPHAGE-XR) 500 MG 24 hr tablet Take 2 tablets (1,000 mg total) by mouth 2 (two) times daily with meals. 360 tablet 0    montelukast (SINGULAIR) 10 mg tablet   1    nystatin-triamcinolone (MYCOLOG) ointment Apply topically 2 (two) times daily. 60 g 2    pantoprazole (PROTONIX) 40 MG tablet once daily.   0    propranolol (INDERAL) 20 MG tablet Take 1 tablet (20 mg total) by mouth 2 (two) times daily. 60 tablet 0    TAZTIA  mg CpSR TK ONE CAPSULE PO D  2    topiramate (TOPAMAX) 200 MG Tab Take 1 tablet (200 mg total) by mouth 2 (two) times daily. 60 tablet 11    venlafaxine (EFFEXOR-XR) 75 MG 24 hr capsule Take 75 mg by mouth once daily.       No current facility-administered medications on file prior to visit.      Review of patient's allergies indicates:  No Known Allergies  Past Surgical History:   Procedure Laterality Date     SECTION      CHOLECYSTECTOMY      SINUS SURGERY       Family History   Problem Relation Age of Onset    Cancer Mother     Hypertension Mother     Diabetes Mother     Stroke Father     Heart disease Father     COPD Father     Heart attack Father     Cancer Maternal Grandmother      Social History     Social History    Marital status:      Spouse name: N/A    Number of children: N/A    Years of education: N/A     Occupational History    Not on file.     Social History Main Topics    Smoking status: Current Every Day Smoker     Packs/day: 1.00     Years: 15.00     Types: Cigarettes     Last attempt to quit: 6/10/2015    Smokeless tobacco: Never Used    Alcohol use No    Drug use: No    Sexual activity: Yes     Partners: Male     Birth control/ protection:  "None     Other Topics Concern    Not on file     Social History Narrative    No narrative on file        Review of Systems   Constitution: Negative for chills, fever and weakness.   Cardiovascular: Positive for leg swelling. Negative for chest pain and claudication.   Respiratory: Negative for cough and shortness of breath.    Skin: Positive for dry skin and nail changes. Negative for itching and rash.   Musculoskeletal: Positive for arthritis, joint pain and myalgias. Negative for falls, joint swelling and muscle weakness.   Gastrointestinal: Negative for diarrhea, nausea and vomiting.   Neurological: Positive for numbness and paresthesias. Negative for tremors.   Psychiatric/Behavioral: Negative for altered mental status and hallucinations.           Objective:       Vitals:    06/21/17 1022   Weight: (!) 156.9 kg (346 lb)   Height: 5' 4" (1.626 m)   PainSc: 10-Worst pain ever   PainLoc: Foot       Physical Exam   Constitutional:   General: Pt. is well-developed, well-nourished, appears stated age, in no acute distress, alert and oriented x 3. No evidence of depression, anxiety, or agitation. Calm, cooperative, and communicative. Appropriate interactions and affect.       Cardiovascular:   Pulses:       Dorsalis pedis pulses are 1+ on the right side, and 1+ on the left side.        Posterior tibial pulses are 1+ on the right side, and 1+ on the left side.   Dorsalis pedis and posterior tibial pulses are diminished Bilaterally. Toes are cool to touch. Feet are warm proximally.There is decreased digital hair. Skin is atrophic, hyperpigmented, and mildly edematous       Musculoskeletal:        Right ankle: She exhibits decreased range of motion and swelling. No tenderness. No lateral malleolus, no medial malleolus, no CF ligament, no head of 5th metatarsal and no proximal fibula tenderness found. Achilles tendon exhibits no pain and no defect.        Left ankle: She exhibits decreased range of motion and swelling. " No tenderness. No lateral malleolus, no medial malleolus, no head of 5th metatarsal and no proximal fibula tenderness found. Achilles tendon exhibits no pain and no defect.        Right foot: There is tenderness (Plantar medial calcaneal tuber. mild pain to palpation of 5th met hase and resistance to eversion). There is normal range of motion.        Left foot: There is tenderness (plantar calcaneal medial tuber. mild pain with palpation of 5th met base and eversion resistance. ). There is normal range of motion.   Biomechanical exam: Pain on palpation bilateral medial calcaneal tubercle at origin of plantar fascia. There is equinus deformity bilateral with decreased dorsiflexion at the ankle joint bilateral. No tenderness with compression of heel. Negative tinels sign. Gait analysis reveals excessive pronation through midstance and propulsion with early heel off. Shoes reveals lateral heel counter wear bilateral     Muscle strength is 5/5 in all groups bilaterally.    Decreased stride, station of gait.  apropulsive toe off.  Increased angle and base of gait.    Patient has hammertoes of digits 2-5 bilateral partially reducible without symptom today.    Decreased first MPJ range of motion both weightbearing and nonweightbearing, no crepitus observed the first MP joint,+ dorsal flag sign. Mild  bunion deformity is observed .    Decreased arch height noted b/l.    Neurological: A sensory deficit is present.   Bison-Arjun 5.07 monofilamant testing is diminished Maciej feet. Sharp/dull sensation diminished Bilaterally.     Paresthesias, and hyperesthesia bilateral feet with no clearly identified trigger or source.     Skin: Skin is warm, dry and intact. No abrasion, no ecchymosis, no lesion and no rash noted. She is not diaphoretic. No cyanosis or erythema. No pallor. Nails show no clubbing.   Toenails 1-5 bilaterally neatly trimmed and painted thickened by 2-3 mm,     Interdigital Spaces clean, dry and without  evidence of break in skin integrity    Small < 1cm fissure noted to plantar medial R heel. Stable without signs of infection. No erythema or drainage noted.    Psychiatric: She has a normal mood and affect. Her speech is normal.   Nursing note and vitals reviewed.            Assessment:       Encounter Diagnoses   Name Primary?    Plantar fasciitis Yes    Chronic heel pain, right     Nontraumatic tear of plantar fascia     Uncontrolled type 2 diabetes with neuropathy          Plan:       Yanni was seen today for follow-up and foot pain.    Diagnoses and all orders for this visit:    Plantar fasciitis    Chronic heel pain, right    Nontraumatic tear of plantar fascia    Uncontrolled type 2 diabetes with neuropathy      I counseled the patient on her conditions, their implications and medical management.     - Shoe inspection. Diabetic Foot Education. Patient reminded of the importance of good nutrition and blood sugar control to help prevent podiatric complications of diabetes. Patient instructed on proper foot hygeine. We discussed wearing proper shoe gear, daily foot inspections, never walking without protective shoe gear, never putting sharp instruments to feet. A1c elevation may lay a role in prolonged PF    Discussed different treatment options for foot pain. I gave written and verbal instructions on stretching exercises. Patient expressed understanding. Discussed icing the affected area as needed and also wearing appropriate shoe gear and avoiding flats, slippers, sandals, and going barefoot. My recommendation for OTC supports is [employer]. Patient instructed on adequate icing techniques. Patient should ice the affected area at least once per day x 10 minutes for 10 days . I advised the patient that extra icing would also be beneficial to ensure adequate anti inflammatory effect. We also discussed cortisone injections and NSAID therapy.     Also discussed pain management consult to help aide in  pain control. Her  would like to hold off on pain management and consider surgery since this has been bothering her for some time. Patient is unsure if she would like to consider surgery.     Long discussion with patient regarding surgery for Plantar fasciotomy. All perioperative expectations, risks, alternatives and complications of such a procedure discussed in detail. She verbalized understanding and she will think about starting the process for surgery. She will get clearance from PCP as soon as she is ready and return after for further discussion about surgery and to sign consents, schedule case.     She would like to avoid injection today. She will continue previous and above treatment options for now.     RTC once surgical clearance obtained per PCP and neurologist.

## 2017-06-21 NOTE — PROGRESS NOTES
Chart reviewed.  Attempt to contact patient.  Left voice mail message requesting return call.  LCSW will call back at a later date.

## 2017-06-22 ENCOUNTER — OUTPATIENT CASE MANAGEMENT (OUTPATIENT)
Dept: ADMINISTRATIVE | Facility: OTHER | Age: 47
End: 2017-06-22

## 2017-06-26 ENCOUNTER — LAB VISIT (OUTPATIENT)
Dept: LAB | Facility: HOSPITAL | Age: 47
End: 2017-06-26
Attending: FAMILY MEDICINE
Payer: MEDICARE

## 2017-06-26 DIAGNOSIS — N18.30 TYPE 2 DIABETES MELLITUS WITH STAGE 3 CHRONIC KIDNEY DISEASE, WITHOUT LONG-TERM CURRENT USE OF INSULIN: ICD-10-CM

## 2017-06-26 DIAGNOSIS — E11.22 TYPE 2 DIABETES MELLITUS WITH STAGE 3 CHRONIC KIDNEY DISEASE, WITHOUT LONG-TERM CURRENT USE OF INSULIN: ICD-10-CM

## 2017-06-26 PROCEDURE — 83036 HEMOGLOBIN GLYCOSYLATED A1C: CPT

## 2017-06-26 PROCEDURE — 36415 COLL VENOUS BLD VENIPUNCTURE: CPT | Mod: PN

## 2017-06-27 ENCOUNTER — OUTPATIENT CASE MANAGEMENT (OUTPATIENT)
Dept: ADMINISTRATIVE | Facility: OTHER | Age: 47
End: 2017-06-27

## 2017-06-28 ENCOUNTER — OUTPATIENT CASE MANAGEMENT (OUTPATIENT)
Dept: ADMINISTRATIVE | Facility: OTHER | Age: 47
End: 2017-06-28

## 2017-06-28 ENCOUNTER — TELEPHONE (OUTPATIENT)
Dept: FAMILY MEDICINE | Facility: CLINIC | Age: 47
End: 2017-06-28

## 2017-06-28 LAB
ESTIMATED AVG GLUCOSE: 126 MG/DL
HBA1C MFR BLD HPLC: 6 %

## 2017-06-28 NOTE — PROGRESS NOTES
Spoke with pt. Reviewed upcoming appts. Pt states she forgot the time for the appt with LCSW. Call placed to dept. Pt's appt is 7/5 at 11 am. Pt states she will be there. Pt had Hgb A1C drawn on 6/26. A1C is down to 6.0 from 10.3. Postive reinforcement provided. Pt states she has been doing better with diet and monitoring her blood sugars. Encouraged pt to continue. Pt cannot state s/s hypo/hyperglycemia.     Interventions performed:   Review upcoming appts.   Call psych to confirm appt time for pt on 7/5  Reviewed BG monitoring  Reviewed diabetic diet   Reviewed s/s hypo/hyperglycemia    Plan:     Follow up on appt with LCSW  Continue education

## 2017-06-28 NOTE — TELEPHONE ENCOUNTER
----- Message from Carlyle Gordillo MD sent at 6/28/2017 11:41 AM CDT -----  Please let patient know that her blood sugars are back to normal.    Thanks,  Dr. Gordillo

## 2017-06-30 ENCOUNTER — OUTPATIENT CASE MANAGEMENT (OUTPATIENT)
Dept: ADMINISTRATIVE | Facility: OTHER | Age: 47
End: 2017-06-30

## 2017-06-30 NOTE — PROGRESS NOTES
Chart reviewed.  Referral to Dr. De Paz, [facility], by Dr. Gordillo noted.  Telephonic follow-up with patient.  Patient has received and reviewed resource information mailed to her.  She had no questions about the information.  Patient reminded of her upcoming appointment with Cassandra Rockweiller on 7/5/17 at 11:00 AM.  Patient said she intends on making the appointment.  Patient's other upcoming appointments reviewed.  Patient has questions about one of them.  She was directed to the appointment desk for an answer to her question.  Patient said she has not yet hear from Dr. Díaz about scheduling an appointment.  Patient was encouraged to call Dr. Díaz's office to schedule the visit.  She said she had the phone number.  Patent states that she is doing okay with depression.  Patient informed that Dr. Gordillo has referred patient to Dr. De Paz.  The telephone number for [facility] provided and patient will call to schedule appointment.  Patient encouraged to keep her upcoming appointment with Ms. Rockweiller and discuss the referral to Dr. De Paz at that time.    Follow-up to:  Ensure that mental health appointments are scheduled and attended.  Provide ongoing support as needed.

## 2017-07-03 ENCOUNTER — TELEPHONE (OUTPATIENT)
Dept: PODIATRY | Facility: CLINIC | Age: 47
End: 2017-07-03

## 2017-07-05 ENCOUNTER — TELEPHONE (OUTPATIENT)
Dept: NEUROLOGY | Facility: CLINIC | Age: 47
End: 2017-07-05

## 2017-07-05 ENCOUNTER — OFFICE VISIT (OUTPATIENT)
Dept: PSYCHIATRY | Facility: CLINIC | Age: 47
DRG: 101 | End: 2017-07-05
Payer: MEDICARE

## 2017-07-05 DIAGNOSIS — F33.3 SEVERE EPISODE OF RECURRENT MAJOR DEPRESSIVE DISORDER, WITH PSYCHOTIC FEATURES: Primary | ICD-10-CM

## 2017-07-05 DIAGNOSIS — F41.1 ANXIETY STATE, UNSPECIFIED: ICD-10-CM

## 2017-07-05 PROCEDURE — 90791 PSYCH DIAGNOSTIC EVALUATION: CPT | Mod: S$GLB,,, | Performed by: SOCIAL WORKER

## 2017-07-05 NOTE — TELEPHONE ENCOUNTER
Spoke to pt and advised EMU 7/6 is approved and instructions will be emailed as requested. Pt confirmed appt.

## 2017-07-05 NOTE — PROGRESS NOTES
"Psychiatry Initial Visit (PhD/LCSW)  Diagnostic Interview - CPT 30588    Date: 7/5/2017     Site: [facility]    Referral source: Dr. Gordillo    Clinical status of patient: Outpatient    Yanni Kaiser, a 46 y.o. female, for initial evaluation visit.  Met with patient.    Chief complaint/reason for encounter: depression and anxiety    History of present illness: Patient is a referral from Dr. Gordillo due to increased depression. Broke down with Dr. Gordillo and told him that she didn't want to be "here" anymore. Reports that she was tired. Reports that she has been suffering with depression for a long time. Endorses feelings of hopelessness, helplessness, and sadness. Reports that she has tried to kill herself 3 times before. Reports that all three times were by taking pills. Denies current thoughts of suicide. Reports that she has been in the psychiatric hospital twice at [facility]. Was seeing a psychiatrist in the past but is no long seeing anyone. Previously seeing doctor at [facility]. Reports in the past she would have crying spells but isn't crying recently. States that she feels like she wants to cry but can't. Reports that when she is upset she will hit things (walls, doors, self). Endorses problems with thinking, slowed thoughts, can't concentration. Reports that she doesn't know if she has hallucinations, reports she doesn't know if she is hearing things. Diagnosed in the past with anxiety and depression. Endorses restlessness.     Reports that sleep is "not good". Reports that she has "spells" where she doesn't sleep. Reports trouble with falling asleep and staying asleep. Wakes up and can't go back to sleep. Has to sleep with the TV on, states that if it is off her thoughts are all over the place. Appetite is "messed up". Appetite will be non-existent for a couple days. Denies problems with ADL's. Discussed process and frequency of therapy. Discussed limits of confidentiality.       Pain: " "noncontributory    Symptoms:   · Mood: depressed mood, diminished interest, insomnia, fatigue and social isolation  · Anxiety: excessive anxiety/worry, restlessness/keyed up and irritability  · Substance abuse: denied  · Cognitive functioning: denied  · Health behaviors: noncontributory    Psychiatric history: prior inpatient treatment, psychotropic management by PCP, prior suicide attempt(s) and has participated in counseling/psychotherapy on an outpatient basis in the past    Medical history: diabetes    Family history of psychiatric illness:  - schizophrenia    Social history (marriage, employment, etc.): Born and raised in [place]. Childhood was "fine". Raised by both parents. Parents worked. 1 older brother, 1 half brother. 9th grade education, got GED. Associates degree in accounting/business administration. Dropped out of school because she had a baby at 17.  x1, 4 children (29, 27, 25, 24). Lives in [place] with , youngest daughter and 2 children live with patient. Use to work in customer service, various jobs. Draws SSI and disability,  also has SSI and disability. Financial stressors, "always need more money". Never arrested, never in . Patient reports no hobbies. Goes to [employer], [employer]    Substance use:   Alcohol: none   Drugs: none   Tobacco: smoke, 1.5 ppd, has been down to 1/2 pack a day   Caffeine: occasional coffee    Current medications and drug reactions (include OTC, herbal): see medication list    Strengths and liabilities: Strength: Patient accepts guidance/feedback, Liability: Patient lacks coping skills.    Current Evaluation:     Mental Status Exam:  General Appearance:  unremarkable, age appropriate, obese   Speech: slowed, delayed, soft      Level of Cooperation: guarded      Thought Processes: blocked   Mood: depressed      Thought Content: normal, no suicidality, no homicidality, delusions, or paranoia   Affect: congruent and appropriate "   Orientation: Oriented x3   Memory: recent >  intact, remote >  intact   Attention Span & Concentration: intact   Fund of General Knowledge: intact and appropriate to age and level of education   Abstract Reasoning: did not assess   Judgment & Insight: limited     Language  intact     Diagnostic Impression - Plan:       ICD-10-CM ICD-9-CM   1. Severe episode of recurrent major depressive disorder, with psychotic features F33.3 296.34   2. Anxiety state, unspecified F41.1 300.00       Plan:individual psychotherapy    Return to Clinic: 2 weeks, 1 month    Length of Service (minutes): 45     Cassandra Rockweiler, LCSW

## 2017-07-06 ENCOUNTER — HOSPITAL ENCOUNTER (INPATIENT)
Facility: HOSPITAL | Age: 47
LOS: 4 days | Discharge: HOME | DRG: 101 | End: 2017-07-10
Attending: PSYCHIATRY & NEUROLOGY | Admitting: PSYCHIATRY & NEUROLOGY
Payer: MEDICARE

## 2017-07-06 DIAGNOSIS — G40.219 COMPLEX PARTIAL EPILEPSY, INTRACTABLE: ICD-10-CM

## 2017-07-06 DIAGNOSIS — R51.9 CHRONIC NONINTRACTABLE HEADACHE, UNSPECIFIED HEADACHE TYPE: ICD-10-CM

## 2017-07-06 DIAGNOSIS — G40.909 SEIZURE DISORDER: Primary | Chronic | ICD-10-CM

## 2017-07-06 DIAGNOSIS — G40.219 PARTIAL SYMPTOMATIC EPILEPSY WITH COMPLEX PARTIAL SEIZURES, INTRACTABLE, WITHOUT STATUS EPILEPTICUS: ICD-10-CM

## 2017-07-06 DIAGNOSIS — G89.29 CHRONIC NONINTRACTABLE HEADACHE, UNSPECIFIED HEADACHE TYPE: ICD-10-CM

## 2017-07-06 DIAGNOSIS — G93.2 IIH (IDIOPATHIC INTRACRANIAL HYPERTENSION): ICD-10-CM

## 2017-07-06 DIAGNOSIS — R56.9 SEIZURES: ICD-10-CM

## 2017-07-06 DIAGNOSIS — G40.219 LOCALZ-RLTD SYMPTOMATIC EPILEPSY W CMPLX PART SZ, INTRACT, WO STATUS: ICD-10-CM

## 2017-07-06 LAB
ALBUMIN SERPL BCP-MCNC: 3.2 G/DL
ALP SERPL-CCNC: 77 U/L
ALT SERPL W/O P-5'-P-CCNC: 12 U/L
ANION GAP SERPL CALC-SCNC: 7 MMOL/L
AST SERPL-CCNC: 10 U/L
B-HCG UR QL: NEGATIVE
BASOPHILS # BLD AUTO: 0.05 K/UL
BASOPHILS NFR BLD: 0.9 %
BILIRUB SERPL-MCNC: 0.4 MG/DL
BILIRUB UR QL STRIP: NEGATIVE
BUN SERPL-MCNC: 12 MG/DL
CALCIUM SERPL-MCNC: 8.9 MG/DL
CHLORIDE SERPL-SCNC: 112 MMOL/L
CLARITY UR REFRACT.AUTO: CLEAR
CO2 SERPL-SCNC: 24 MMOL/L
COLOR UR AUTO: YELLOW
CREAT SERPL-MCNC: 1.1 MG/DL
DIFFERENTIAL METHOD: ABNORMAL
EOSINOPHIL # BLD AUTO: 0.5 K/UL
EOSINOPHIL NFR BLD: 8.6 %
ERYTHROCYTE [DISTWIDTH] IN BLOOD BY AUTOMATED COUNT: 19.5 %
EST. GFR  (AFRICAN AMERICAN): >60 ML/MIN/1.73 M^2
EST. GFR  (NON AFRICAN AMERICAN): >60 ML/MIN/1.73 M^2
GLUCOSE SERPL-MCNC: 95 MG/DL
GLUCOSE UR QL STRIP: NEGATIVE
HCT VFR BLD AUTO: 33.3 %
HGB BLD-MCNC: 10.1 G/DL
HGB UR QL STRIP: NEGATIVE
KETONES UR QL STRIP: NEGATIVE
LEUKOCYTE ESTERASE UR QL STRIP: NEGATIVE
LYMPHOCYTES # BLD AUTO: 1.8 K/UL
LYMPHOCYTES NFR BLD: 32.8 %
MCH RBC QN AUTO: 23.8 PG
MCHC RBC AUTO-ENTMCNC: 30.3 %
MCV RBC AUTO: 79 FL
MONOCYTES # BLD AUTO: 0.5 K/UL
MONOCYTES NFR BLD: 9.6 %
NEUTROPHILS # BLD AUTO: 2.6 K/UL
NEUTROPHILS NFR BLD: 47.9 %
NITRITE UR QL STRIP: NEGATIVE
PH UR STRIP: 5 [PH] (ref 5–8)
PLATELET # BLD AUTO: 310 K/UL
PMV BLD AUTO: 8.6 FL
POCT GLUCOSE: 112 MG/DL (ref 70–110)
POTASSIUM SERPL-SCNC: 3.6 MMOL/L
PROT SERPL-MCNC: 7.1 G/DL
PROT UR QL STRIP: NEGATIVE
RBC # BLD AUTO: 4.24 M/UL
SODIUM SERPL-SCNC: 143 MMOL/L
SP GR UR STRIP: 1.01 (ref 1–1.03)
URN SPEC COLLECT METH UR: NORMAL
UROBILINOGEN UR STRIP-ACNC: NEGATIVE EU/DL
WBC # BLD AUTO: 5.33 K/UL

## 2017-07-06 PROCEDURE — 95957 EEG DIGITAL ANALYSIS: CPT

## 2017-07-06 PROCEDURE — 25000003 PHARM REV CODE 250: Performed by: PHYSICIAN ASSISTANT

## 2017-07-06 PROCEDURE — 81003 URINALYSIS AUTO W/O SCOPE: CPT

## 2017-07-06 PROCEDURE — 95951 PR EEG MONITORING/VIDEORECORD: CPT | Mod: 26,,, | Performed by: PSYCHIATRY & NEUROLOGY

## 2017-07-06 PROCEDURE — 99233 SBSQ HOSP IP/OBS HIGH 50: CPT | Mod: ,,, | Performed by: PSYCHIATRY & NEUROLOGY

## 2017-07-06 PROCEDURE — 36415 COLL VENOUS BLD VENIPUNCTURE: CPT

## 2017-07-06 PROCEDURE — 81025 URINE PREGNANCY TEST: CPT

## 2017-07-06 PROCEDURE — 80053 COMPREHEN METABOLIC PANEL: CPT

## 2017-07-06 PROCEDURE — 95951 HC EEG MONITORING/VIDEO RECORD: CPT

## 2017-07-06 PROCEDURE — 93005 ELECTROCARDIOGRAM TRACING: CPT

## 2017-07-06 PROCEDURE — 20600001 HC STEP DOWN PRIVATE ROOM

## 2017-07-06 PROCEDURE — 80307 DRUG TEST PRSMV CHEM ANLYZR: CPT

## 2017-07-06 PROCEDURE — 93010 ELECTROCARDIOGRAM REPORT: CPT | Mod: ,,, | Performed by: INTERNAL MEDICINE

## 2017-07-06 PROCEDURE — 85025 COMPLETE CBC W/AUTO DIFF WBC: CPT

## 2017-07-06 RX ORDER — ONDANSETRON 2 MG/ML
4 INJECTION INTRAMUSCULAR; INTRAVENOUS EVERY 12 HOURS PRN
Status: DISCONTINUED | OUTPATIENT
Start: 2017-07-06 | End: 2017-07-10 | Stop reason: HOSPADM

## 2017-07-06 RX ORDER — VENLAFAXINE HYDROCHLORIDE 75 MG/1
75 CAPSULE, EXTENDED RELEASE ORAL NIGHTLY
Status: DISCONTINUED | OUTPATIENT
Start: 2017-07-06 | End: 2017-07-10 | Stop reason: HOSPADM

## 2017-07-06 RX ORDER — MONTELUKAST SODIUM 10 MG/1
10 TABLET ORAL NIGHTLY
Status: DISCONTINUED | OUTPATIENT
Start: 2017-07-06 | End: 2017-07-10 | Stop reason: HOSPADM

## 2017-07-06 RX ORDER — INSULIN ASPART 100 [IU]/ML
0-5 INJECTION, SOLUTION INTRAVENOUS; SUBCUTANEOUS
Status: DISCONTINUED | OUTPATIENT
Start: 2017-07-06 | End: 2017-07-10 | Stop reason: HOSPADM

## 2017-07-06 RX ORDER — QUETIAPINE FUMARATE 25 MG/1
25 TABLET, FILM COATED ORAL NIGHTLY
Status: DISCONTINUED | OUTPATIENT
Start: 2017-07-06 | End: 2017-07-07

## 2017-07-06 RX ORDER — IBUPROFEN 200 MG
16 TABLET ORAL
Status: DISCONTINUED | OUTPATIENT
Start: 2017-07-06 | End: 2017-07-10 | Stop reason: HOSPADM

## 2017-07-06 RX ORDER — PANTOPRAZOLE SODIUM 40 MG/1
40 TABLET, DELAYED RELEASE ORAL DAILY
Status: DISCONTINUED | OUTPATIENT
Start: 2017-07-07 | End: 2017-07-10 | Stop reason: HOSPADM

## 2017-07-06 RX ORDER — IBUPROFEN 200 MG
24 TABLET ORAL
Status: DISCONTINUED | OUTPATIENT
Start: 2017-07-06 | End: 2017-07-10 | Stop reason: HOSPADM

## 2017-07-06 RX ORDER — ATORVASTATIN CALCIUM 20 MG/1
20 TABLET, FILM COATED ORAL NIGHTLY
Status: DISCONTINUED | OUTPATIENT
Start: 2017-07-06 | End: 2017-07-10 | Stop reason: HOSPADM

## 2017-07-06 RX ORDER — ONDANSETRON 8 MG/1
8 TABLET, ORALLY DISINTEGRATING ORAL EVERY 8 HOURS PRN
Status: DISCONTINUED | OUTPATIENT
Start: 2017-07-06 | End: 2017-07-10 | Stop reason: HOSPADM

## 2017-07-06 RX ORDER — GLUCAGON 1 MG
1 KIT INJECTION
Status: DISCONTINUED | OUTPATIENT
Start: 2017-07-06 | End: 2017-07-10 | Stop reason: HOSPADM

## 2017-07-06 RX ORDER — ENOXAPARIN SODIUM 100 MG/ML
40 INJECTION SUBCUTANEOUS EVERY 24 HOURS
Status: DISCONTINUED | OUTPATIENT
Start: 2017-07-06 | End: 2017-07-10 | Stop reason: HOSPADM

## 2017-07-06 RX ORDER — PROPRANOLOL HYDROCHLORIDE 10 MG/1
20 TABLET ORAL NIGHTLY
Status: DISCONTINUED | OUTPATIENT
Start: 2017-07-06 | End: 2017-07-10 | Stop reason: HOSPADM

## 2017-07-06 RX ORDER — SODIUM CHLORIDE 0.9 % (FLUSH) 0.9 %
3 SYRINGE (ML) INJECTION EVERY 8 HOURS
Status: DISCONTINUED | OUTPATIENT
Start: 2017-07-06 | End: 2017-07-10 | Stop reason: HOSPADM

## 2017-07-06 RX ORDER — IBUPROFEN 200 MG
1 TABLET ORAL DAILY
Status: DISCONTINUED | OUTPATIENT
Start: 2017-07-07 | End: 2017-07-10 | Stop reason: HOSPADM

## 2017-07-06 RX ORDER — ACETAMINOPHEN 325 MG/1
650 TABLET ORAL EVERY 4 HOURS PRN
Status: DISCONTINUED | OUTPATIENT
Start: 2017-07-06 | End: 2017-07-10 | Stop reason: HOSPADM

## 2017-07-06 RX ORDER — LOSARTAN POTASSIUM 50 MG/1
100 TABLET ORAL NIGHTLY
Status: DISCONTINUED | OUTPATIENT
Start: 2017-07-06 | End: 2017-07-10 | Stop reason: HOSPADM

## 2017-07-06 RX ORDER — DOCUSATE SODIUM 100 MG/1
100 CAPSULE, LIQUID FILLED ORAL DAILY PRN
Status: DISCONTINUED | OUTPATIENT
Start: 2017-07-06 | End: 2017-07-10 | Stop reason: HOSPADM

## 2017-07-06 RX ORDER — ACETAZOLAMIDE 500 MG/1
500 CAPSULE, EXTENDED RELEASE ORAL 2 TIMES DAILY
Status: DISCONTINUED | OUTPATIENT
Start: 2017-07-06 | End: 2017-07-10 | Stop reason: HOSPADM

## 2017-07-06 RX ADMIN — ATORVASTATIN CALCIUM 20 MG: 20 TABLET, FILM COATED ORAL at 10:07

## 2017-07-06 RX ADMIN — QUETIAPINE FUMARATE 25 MG: 25 TABLET, FILM COATED ORAL at 10:07

## 2017-07-06 RX ADMIN — LOSARTAN POTASSIUM 100 MG: 50 TABLET, FILM COATED ORAL at 10:07

## 2017-07-06 RX ADMIN — ACETAZOLAMIDE 500 MG: 500 CAPSULE, EXTENDED RELEASE ORAL at 10:07

## 2017-07-06 RX ADMIN — VENLAFAXINE HYDROCHLORIDE 75 MG: 75 CAPSULE, EXTENDED RELEASE ORAL at 10:07

## 2017-07-06 RX ADMIN — MONTELUKAST SODIUM 10 MG: 10 TABLET, FILM COATED ORAL at 10:07

## 2017-07-06 RX ADMIN — PROPRANOLOL HYDROCHLORIDE 20 MG: 10 TABLET ORAL at 10:07

## 2017-07-06 NOTE — HPI
History of Present Illness:  The patient is a 46 y.o.  RHAAF with a PMH of HTN, HLD, Diabetes, SHARATH, IIH, migraines, Asthma, and Tobacco abuse  The patient was accompanied by her significant other who provided some of the history.      Patient started having evens in 2002. She reports 2 types of events. The first type she describes as whole body stiffening and jerking during sleep. Endorses tounge biting and incontinence with these events. She has not had one of these events in over 2 years. The second type she describes as staring spells. She reports that these occur a couple times a month. She is currently on Topamax 200 mg BID and Gabapentin 600 mg BID.     Seizure Triggers  Sleep Deprivation - None  Other medications - None  Psych/stress - None  Photic stimulation - None  Hyperventilation - None  Medical Problems - None  Menses - No  Sensory Stimulation (light, sound, etc) - None  Missed dose of meds - None    AED Treatments  Present regimen  Topamax  Gabapentin    Prior treatments  Lamictal     Not tried  acetazolamide (Diamox, AZM)  amantadine  carbamazepine (Tegretol, CBZ)  clobezam (Onfi or Frizium, CLB)  ethosuximide (Zarontin, ESM)  eslicarbazine (Aptiom, ESL)  felbamate (felbatol, FBM)  lacosamide (Vimpat, LCS)   levetiracetam (Keppra, LEV)  methsuximide (Celontin, MSM)  methyphenytoin (Mesantion, MHT)  oxcarbazepine (Trileptal OXC)  perampanel (Fycompa, FCP)   phenobarbital (Pb)  phenytoin (Dilantin, PHT)  pregabalin (Lyrica, PGB)  primidone (Mysoline, PRM)  retigabine (Potiga, RTG)  rufinamide (Banzel, RUF)  tiagabine (Gabatril,  TGB)  viagabatrin, (Sabril, VGB)  vagal nerve stimulator (VNS)  valproic acid (Depakote, VPA)  zonisamide (Zonegran, ZNA)  Benzodiazepines  diazepam - rectal (Diastatl)  diazepam - oral (Valium, DZ)  clonazepam (Klonopin, CZP)  clorazepate (Tranxene, CLZ)  Ativan  Brain Stimulation  Vagal Nerve Stimulation-n/a  DBS- n/a    Compliance method  Memory - yes  Mom or Spouse -  Yes  Pill Box - no  Matthew calendar - no  Turn over medication bottle - no  Phone alarm - no    Seizure Evaluation  EEG Routine -   7/15/15  Interpretation: This is a normal EEG in an awake, drowsy, and asleep adult.  Please be aware that a normal EEG does not exclude the possibility of an underlying seizure disorder.  EEG Ambulatory -   EEG\Video Monitoring -   MRI/MRA -   3/21/16  Unremarkable motion limited MRI brain specifically without evidence for acute infarction or enhancing lesion.  CT/CTA Scan -   PET Scan -   Neuropsychological evaluation -   DEXA Scan    Potential Epilepsy Risk Factors:   Pregnancy/Labor/Delivery - full term uncomplicated pregnancy labor and vaginal delivery  Febrile seizures - none  Head injury  - none  CNS infection - none     Stroke - none  Family Hx of Sz - none

## 2017-07-06 NOTE — ASSESSMENT & PLAN NOTE
Focal seizures with rare secondary generalization vs non-epilepticevents     - Start VEEG  - Hold Topamax and gabapentin   - Seizure precautions  - Activation procedures per protocol  - IV Valium PRN for GTC greater than 5 min - hospital medicine to call epilepsy on call before administering

## 2017-07-06 NOTE — PROGRESS NOTES
Pt arrived to rm 747. Pt oriented to room, call light within reach. EEG monitoring already in place. VSS. NAD.

## 2017-07-06 NOTE — H&P
[facility]  Neurology-Epilepsy  History & Physical    Patient Name: Yanni Kaiser  MRN: [id number]   Admission Date: (Not on file)  Code Status: Prior   Attending Provider: VERENA Reed MD   Primary Care Physician: Carlyle Gordillo MD  Principal Problem:Localz-rltd symptomatic epilepsy w cmplx part sz, intract, wo status    Subjective:     History of Present Illness:  The patient is a 46 y.o.  RHAAF with a PMH of HTN, HLD, Diabetes, SHARATH, IIH, migraines, Asthma, and Tobacco abuse  The patient was accompanied by her significant other who provided some of the history.      Patient started having evens in 2002. She reports 2 types of events. The first type she describes as whole body stiffening and jerking during sleep. Endorses tounge biting and incontinence with these events. She has not had one of these events in over 2 years. The second type she describes as staring spells. She reports that these occur a couple times a month. She si currently on Topamax 200 mg BID and Gabapentin 600 mg BID.     Seizure Triggers  Sleep Deprivation - None  Other medications - None  Psych/stress - None  Photic stimulation - None  Hyperventilation - None  Medical Problems - None  Menses - No  Sensory Stimulation (light, sound, etc) - None  Missed dose of meds - None    AED Treatments  Present regimen  Topamax  Gabapentin    Prior treatments  Lamictal     Not tried  acetazolamide (Diamox, AZM)  amantadine  carbamazepine (Tegretol, CBZ)  clobezam (Onfi or Frizium, CLB)  ethosuximide (Zarontin, ESM)  eslicarbazine (Aptiom, ESL)  felbamate (felbatol, FBM)  lacosamide (Vimpat, LCS)   levetiracetam (Keppra, LEV)  methsuximide (Celontin, MSM)  methyphenytoin (Mesantion, MHT)  oxcarbazepine (Trileptal OXC)  perampanel (Fycompa, FCP)   phenobarbital (Pb)  phenytoin (Dilantin, PHT)  pregabalin (Lyrica, PGB)  primidone (Mysoline, PRM)  retigabine (Potiga, RTG)  rufinamide (Banzel, RUF)  tiagabine (Gabatril,   TGB)  viagabatrin, (Sabril, VGB)  vagal nerve stimulator (VNS)  valproic acid (Depakote, VPA)  zonisamide (Zonegran, ZNA)  Benzodiazepines  diazepam - rectal (Diastatl)  diazepam - oral (Valium, DZ)  clonazepam (Klonopin, CZP)  clorazepate (Tranxene, CLZ)  Ativan  Brain Stimulation  Vagal Nerve Stimulation-n/a  DBS- n/a    Compliance method  Memory - yes  Mom or Spouse - Yes  Pill Box - no  Matthew calendar - no  Turn over medication bottle - no  Phone alarm - no    Seizure Evaluation  EEG Routine -   7/15/15  Interpretation: This is a normal EEG in an awake, drowsy, and asleep adult.  Please be aware that a normal EEG does not exclude the possibility of an underlying seizure disorder.  EEG Ambulatory -   EEG\Video Monitoring -   MRI/MRA -   3/21/16  Unremarkable motion limited MRI brain specifically without evidence for acute infarction or enhancing lesion.  CT/CTA Scan -   PET Scan -   Neuropsychological evaluation -   DEXA Scan    Potential Epilepsy Risk Factors:   Pregnancy/Labor/Delivery - full term uncomplicated pregnancy labor and vaginal delivery  Febrile seizures - none  Head injury  - none  CNS infection - none     Stroke - none  Family Hx of Sz - none      Past Medical History:   Diagnosis Date    Allergy     Asthma     Diabetes mellitus, type 2     GERD (gastroesophageal reflux disease)     High cholesterol     Hypertension     Pseudotumor cerebri     Seizures     Sleep apnea        Past Surgical History:   Procedure Laterality Date     SECTION      CHOLECYSTECTOMY      SINUS SURGERY         Review of patient's allergies indicates:  No Known Allergies    No current facility-administered medications on file prior to encounter.      Current Outpatient Prescriptions on File Prior to Encounter   Medication Sig    acetaZOLAMIDE (DIAMOX) 500 mg CpSR Take 500 mg by mouth 2 (two) times daily.    atorvastatin (LIPITOR) 20 MG tablet Take 1 tablet (20 mg total) by mouth once daily.    gabapentin  (NEURONTIN) 300 MG capsule Take 2 capsules (600 mg total) by mouth every evening.    LINZESS 290 mcg Cap TK ONE CAPSULE PO D ON AN EMPTY STOMACH    losartan (COZAAR) 100 MG tablet Take 1 tablet (100 mg total) by mouth once daily.    metformin (GLUCOPHAGE-XR) 500 MG 24 hr tablet Take 2 tablets (1,000 mg total) by mouth 2 (two) times daily with meals.    pantoprazole (PROTONIX) 40 MG tablet once daily.     propranolol (INDERAL) 20 MG tablet Take 1 tablet (20 mg total) by mouth 2 (two) times daily.    topiramate (TOPAMAX) 200 MG Tab Take 1 tablet (200 mg total) by mouth 2 (two) times daily.    venlafaxine (EFFEXOR-XR) 75 MG 24 hr capsule Take 75 mg by mouth once daily.    ACCU-CHEK ELVIA PLUS METER Misc TEST FOUR TIMES DAILY BEFORE MEALS AND EVERY NIGHT    albuterol (ACCUNEB) 1.25 mg/3 mL Nebu Take 3 mLs (1.25 mg total) by nebulization every 6 (six) hours as needed. Rescue    albuterol 90 mcg/actuation inhaler Inhale 2 puffs into the lungs every 6 (six) hours as needed for Wheezing. Rescue    albuterol-ipratropium 2.5mg-0.5mg/3mL (DUO-NEB) 0.5 mg-3 mg(2.5 mg base)/3 mL nebulizer solution Take 3 mLs by nebulization every 6 (six) hours as needed for Wheezing or Shortness of Breath. Rescue    blood sugar diagnostic Strp 1 each by Misc.(Non-Drug; Combo Route) route 4 (four) times daily before meals and nightly. ACCU CHEK EVLIA PLUS METER    capsaicin 0.1 % Crea Apply 1 application topically 2 (two) times daily.    EPIPEN 2-RHODA 0.3 mg/0.3 mL (1:1,000) AtIn     fluticasone (FLONASE) 50 mcg/actuation nasal spray 1 spray by Each Nare route daily as needed.    fluticasone-vilanterol (BREO ELLIPTA) 200-25 mcg/dose DsDv diskus inhaler Inhale 1 puff into the lungs once daily.    furosemide (LASIX) 20 MG tablet Take 1 tablet (20 mg total) by mouth once daily.    hydrocortisone 2.5 % cream Apply topically 2 (two) times daily.    ibuprofen (ADVIL,MOTRIN) 600 MG tablet Take 1 tablet (600 mg total) by mouth every 8  (eight) hours as needed for Pain.    lancets (ACCU-CHEK SOFTCLIX LANCETS) Misc 1 Device by Misc.(Non-Drug; Combo Route) route 4 (four) times daily.    lancets 25 gauge Misc 1 lancet by Misc.(Non-Drug; Combo Route) route 4 (four) times daily before meals and nightly.    nystatin-triamcinolone (MYCOLOG) ointment Apply topically 2 (two) times daily.    TAZTIA  mg CpSR TK ONE CAPSULE PO D     Continuous Infusions:    Family History     Problem Relation (Age of Onset)    COPD Father    Cancer Mother, Maternal Grandmother    Diabetes Mother    Heart attack Father    Heart disease Father    Hypertension Mother    Stroke Father        Social History Main Topics    Smoking status: Current Every Day Smoker     Packs/day: 1.00     Years: 15.00     Types: Cigarettes     Last attempt to quit: 6/10/2015    Smokeless tobacco: Never Used    Alcohol use No    Drug use: No    Sexual activity: Yes     Partners: Male     Birth control/ protection: None     Review of Systems   Constitutional: Negative for chills and fever.   HENT: Negative for congestion and sinus pressure.    Eyes: Negative for discharge and visual disturbance.   Respiratory: Negative for cough and shortness of breath.    Cardiovascular: Negative for chest pain and palpitations.   Gastrointestinal: Negative for nausea and vomiting.   Endocrine: Negative for cold intolerance and heat intolerance.   Genitourinary: Negative for dysuria and hematuria.   Musculoskeletal: Negative for arthralgias and myalgias.   Neurological: Positive for seizures. Negative for dizziness, tremors, weakness and headaches.   Psychiatric/Behavioral: Negative for agitation and behavioral problems.     Objective:     Vital Signs (Most Recent):    Vital Signs (24h Range):  BP: ()/()   Arterial Line BP: ()/()         There is no height or weight on file to calculate BMI.    Physical Exam   Constitutional: She is oriented to person, place, and time. She appears well-developed and  well-nourished.   HENT:   Head: Normocephalic and atraumatic.   Eyes: EOM are normal. Pupils are equal, round, and reactive to light.   Cardiovascular: Normal rate and regular rhythm.    Pulmonary/Chest: Effort normal. She has wheezes.   Abdominal: Soft.   Neurological: She is alert and oriented to person, place, and time. She has a normal Finger-Nose-Finger Test.   Skin: Skin is warm and dry.   Psychiatric: She has a normal mood and affect. Her speech is normal.       NEUROLOGICAL EXAMINATION:     MENTAL STATUS   Oriented to person, place, and time.   Attention: normal. Concentration: normal.   Speech: speech is normal   Level of consciousness: alert  Knowledge: good.   Normal comprehension.     CRANIAL NERVES   Cranial nerves II through XII intact.     CN III, IV, VI   Pupils are equal, round, and reactive to light.  Extraocular motions are normal.     MOTOR EXAM   Muscle bulk: normal  Overall muscle tone: normal    Strength   Right neck flexion: 4/5  Left neck flexion: 4/5  Right neck extension: 4/5  Left neck extension: 4/5  Right deltoid: 4/5  Left deltoid: 4/5  Right biceps: 4/5  Left biceps: 4/5  Right triceps: 4/5  Left triceps: 4/5  Right wrist flexion: 4/5  Left wrist flexion: 4/5  Right wrist extension: 4/5  Left wrist extension: 4/5  Right interossei: 4/5  Left interossei: 4/5  Right abdominals: 4/5  Left abdominals: 4/5  Right iliopsoas: 4/5  Left iliopsoas: 4/5  Right quadriceps: 4/5  Left quadriceps: 4/5  Right hamstrin/5  Left hamstrin/5  Right glutei: 4/5  Left glutei: 4/5  Right anterior tibial: 4/5  Left anterior tibial: 4/5  Right posterior tibial: 4/5  Left posterior tibial: 4/5  Right peroneal: 4/5  Left peroneal: 4/5  Right gastroc: 4/5  Left gastroc: 4/5    SENSORY EXAM   Light touch normal.   Proprioception normal.     GAIT AND COORDINATION     Gait  Gait: wide-based     Coordination   Finger to nose coordination: normal    Tremor   Resting tremor: absent  Intention tremor:  absent  Action tremor: absent    Significant Labs: CBC: No results for input(s): WBC, HGB, HCT, PLT in the last 48 hours.  CMP: No results for input(s): GLU, NA, K, CL, CO2, BUN, CREATININE, CALCIUM, MG, PROT, ALBUMIN, BILITOT, ALKPHOS, AST, ALT, ANIONGAP, EGFRNONAA in the last 48 hours.    Significant Studies: I have reviewed and interpreted all pertinent imaging results/findings within the past 24 hours.    Assessment and Plan:     * Localz-rltd symptomatic epilepsy w cmplx part sz, intract, wo status    Focal seizures with rare secondary generalization vs non-epilepticevents     - Start VEEG  - Hold Topamax and gabapentin   - Seizure precautions  - Activation procedures per protocol  - IV Valium PRN for GTC greater than 5 min - hospital medicine to call epilepsy on call before administering            Type 2 diabetes mellitus with stage 3 chronic kidney disease, without long-term current use of insulin    - On Metformin 500 mg BID at home, holding will put on low dose sliding scale for now         GERD (gastroesophageal reflux disease)    - Continue home Protonix 40 mg daily        Hyperlipidemia    - Continue home Lipitor 20 mg QHS        Depression    - Continue home Seroquel 25 mg QHS        Essential hypertension    - Continue home propranolol 20 mg QHS  - Continue home losartan 100 mg QHS        Migraine    - Holding Topamax for EEG monitoring  - Continue Effexor 75 mg QHS  - Continue Diamox 500 mg BID        SHARATH (obstructive sleep apnea)    - Patient not on CPAP at home            VTE Risk Mitigation     None          Nicolle Zhou PA-C  Neurology-Epilepsy  [facility]

## 2017-07-06 NOTE — HOSPITAL COURSE
Patient admitted to EMU for event characterization. Topamax and gabapentin held on admit. NO events thus far. No seizures or epileptic discharges on EEG.  7/7-7/8- No events- sleep deprived  7/8-7/9- no events  7/9-7/10- no events- patient requesting to leave EMU. We discussed that it is important to capture events. Patient stated she is too uncomfortable and does not want to stay. She is adamant about leaving.

## 2017-07-06 NOTE — SUBJECTIVE & OBJECTIVE
Past Medical History:   Diagnosis Date    Allergy     Asthma     Diabetes mellitus, type 2     GERD (gastroesophageal reflux disease)     High cholesterol     Hypertension     Pseudotumor cerebri     Seizures     Sleep apnea        Past Surgical History:   Procedure Laterality Date     SECTION      CHOLECYSTECTOMY      SINUS SURGERY         Review of patient's allergies indicates:  No Known Allergies    No current facility-administered medications on file prior to encounter.      Current Outpatient Prescriptions on File Prior to Encounter   Medication Sig    acetaZOLAMIDE (DIAMOX) 500 mg CpSR Take 500 mg by mouth 2 (two) times daily.    atorvastatin (LIPITOR) 20 MG tablet Take 1 tablet (20 mg total) by mouth once daily.    gabapentin (NEURONTIN) 300 MG capsule Take 2 capsules (600 mg total) by mouth every evening.    LINZESS 290 mcg Cap TK ONE CAPSULE PO D ON AN EMPTY STOMACH    losartan (COZAAR) 100 MG tablet Take 1 tablet (100 mg total) by mouth once daily.    metformin (GLUCOPHAGE-XR) 500 MG 24 hr tablet Take 2 tablets (1,000 mg total) by mouth 2 (two) times daily with meals.    pantoprazole (PROTONIX) 40 MG tablet once daily.     propranolol (INDERAL) 20 MG tablet Take 1 tablet (20 mg total) by mouth 2 (two) times daily.    topiramate (TOPAMAX) 200 MG Tab Take 1 tablet (200 mg total) by mouth 2 (two) times daily.    venlafaxine (EFFEXOR-XR) 75 MG 24 hr capsule Take 75 mg by mouth once daily.    ACCU-CHEK ELVIA PLUS METER Misc TEST FOUR TIMES DAILY BEFORE MEALS AND EVERY NIGHT    albuterol (ACCUNEB) 1.25 mg/3 mL Nebu Take 3 mLs (1.25 mg total) by nebulization every 6 (six) hours as needed. Rescue    albuterol 90 mcg/actuation inhaler Inhale 2 puffs into the lungs every 6 (six) hours as needed for Wheezing. Rescue    albuterol-ipratropium 2.5mg-0.5mg/3mL (DUO-NEB) 0.5 mg-3 mg(2.5 mg base)/3 mL nebulizer solution Take 3 mLs by nebulization every 6 (six) hours as needed for Wheezing  or Shortness of Breath. Rescue    blood sugar diagnostic Strp 1 each by Misc.(Non-Drug; Combo Route) route 4 (four) times daily before meals and nightly. ACCU CHEK ELVIA PLUS METER    capsaicin 0.1 % Crea Apply 1 application topically 2 (two) times daily.    EPIPEN 2-RHODA 0.3 mg/0.3 mL (1:1,000) AtIn     fluticasone (FLONASE) 50 mcg/actuation nasal spray 1 spray by Each Nare route daily as needed.    fluticasone-vilanterol (BREO ELLIPTA) 200-25 mcg/dose DsDv diskus inhaler Inhale 1 puff into the lungs once daily.    furosemide (LASIX) 20 MG tablet Take 1 tablet (20 mg total) by mouth once daily.    hydrocortisone 2.5 % cream Apply topically 2 (two) times daily.    ibuprofen (ADVIL,MOTRIN) 600 MG tablet Take 1 tablet (600 mg total) by mouth every 8 (eight) hours as needed for Pain.    lancets (ACCU-CHEK SOFTCLIX LANCETS) Misc 1 Device by Misc.(Non-Drug; Combo Route) route 4 (four) times daily.    lancets 25 gauge Misc 1 lancet by Misc.(Non-Drug; Combo Route) route 4 (four) times daily before meals and nightly.    nystatin-triamcinolone (MYCOLOG) ointment Apply topically 2 (two) times daily.    TAZTIA  mg CpSR TK ONE CAPSULE PO D     Continuous Infusions:    Family History     Problem Relation (Age of Onset)    COPD Father    Cancer Mother, Maternal Grandmother    Diabetes Mother    Heart attack Father    Heart disease Father    Hypertension Mother    Stroke Father        Social History Main Topics    Smoking status: Current Every Day Smoker     Packs/day: 1.00     Years: 15.00     Types: Cigarettes     Last attempt to quit: 6/10/2015    Smokeless tobacco: Never Used    Alcohol use No    Drug use: No    Sexual activity: Yes     Partners: Male     Birth control/ protection: None     Review of Systems   Constitutional: Negative for chills and fever.   HENT: Negative for congestion and sinus pressure.    Eyes: Negative for discharge and visual disturbance.   Respiratory: Negative for cough and  shortness of breath.    Cardiovascular: Negative for chest pain and palpitations.   Gastrointestinal: Negative for nausea and vomiting.   Endocrine: Negative for cold intolerance and heat intolerance.   Genitourinary: Negative for dysuria and hematuria.   Musculoskeletal: Negative for arthralgias and myalgias.   Neurological: Positive for seizures. Negative for dizziness, tremors, weakness and headaches.   Psychiatric/Behavioral: Negative for agitation and behavioral problems.     Objective:     Vital Signs (Most Recent):    Vital Signs (24h Range):  BP: ()/()   Arterial Line BP: ()/()         There is no height or weight on file to calculate BMI.    Physical Exam   Constitutional: She is oriented to person, place, and time. She appears well-developed and well-nourished.   HENT:   Head: Normocephalic and atraumatic.   Eyes: EOM are normal. Pupils are equal, round, and reactive to light.   Cardiovascular: Normal rate and regular rhythm.    Pulmonary/Chest: Effort normal. She has wheezes.   Abdominal: Soft.   Neurological: She is alert and oriented to person, place, and time. She has a normal Finger-Nose-Finger Test.   Skin: Skin is warm and dry.   Psychiatric: She has a normal mood and affect. Her speech is normal.       NEUROLOGICAL EXAMINATION:     MENTAL STATUS   Oriented to person, place, and time.   Attention: normal. Concentration: normal.   Speech: speech is normal   Level of consciousness: alert  Knowledge: good.   Normal comprehension.     CRANIAL NERVES   Cranial nerves II through XII intact.     CN III, IV, VI   Pupils are equal, round, and reactive to light.  Extraocular motions are normal.     MOTOR EXAM   Muscle bulk: normal  Overall muscle tone: normal    Strength   Right neck flexion: 4/5  Left neck flexion: 4/5  Right neck extension: 4/5  Left neck extension: 4/5  Right deltoid: 4/5  Left deltoid: 4/5  Right biceps: 4/5  Left biceps: 4/5  Right triceps: 4/5  Left triceps: 4/5  Right wrist flexion:  4/5  Left wrist flexion: 4/5  Right wrist extension: 4/5  Left wrist extension: 4/5  Right interossei: 4/5  Left interossei: 4/5  Right abdominals: 4/5  Left abdominals: 4/5  Right iliopsoas: 4/5  Left iliopsoas: 4/5  Right quadriceps: 4/5  Left quadriceps: 4/5  Right hamstrin/5  Left hamstrin/5  Right glutei: 4/5  Left glutei: 4/5  Right anterior tibial: 4/5  Left anterior tibial: 4/5  Right posterior tibial: 4/5  Left posterior tibial: 4/5  Right peroneal: 4/5  Left peroneal: 4/5  Right gastroc: 4/5  Left gastroc: 4/5    SENSORY EXAM   Light touch normal.   Proprioception normal.     GAIT AND COORDINATION     Gait  Gait: wide-based     Coordination   Finger to nose coordination: normal    Tremor   Resting tremor: absent  Intention tremor: absent  Action tremor: absent    Significant Labs: CBC: No results for input(s): WBC, HGB, HCT, PLT in the last 48 hours.  CMP: No results for input(s): GLU, NA, K, CL, CO2, BUN, CREATININE, CALCIUM, MG, PROT, ALBUMIN, BILITOT, ALKPHOS, AST, ALT, ANIONGAP, EGFRNONAA in the last 48 hours.    Significant Studies: I have reviewed and interpreted all pertinent imaging results/findings within the past 24 hours.

## 2017-07-07 LAB
AMPHET+METHAMPHET UR QL: NEGATIVE
BARBITURATES UR QL SCN>200 NG/ML: NEGATIVE
BENZODIAZ UR QL SCN>200 NG/ML: NEGATIVE
BZE UR QL SCN: NEGATIVE
CANNABINOIDS UR QL SCN: NEGATIVE
CREAT UR-MCNC: 139 MG/DL
METHADONE UR QL SCN>300 NG/ML: NEGATIVE
OPIATES UR QL SCN: NEGATIVE
PCP UR QL SCN>25 NG/ML: NEGATIVE
POCT GLUCOSE: 100 MG/DL (ref 70–110)
POCT GLUCOSE: 108 MG/DL (ref 70–110)
POCT GLUCOSE: 138 MG/DL (ref 70–110)
POCT GLUCOSE: 90 MG/DL (ref 70–110)
TOXICOLOGY INFORMATION: NORMAL

## 2017-07-07 PROCEDURE — 20600001 HC STEP DOWN PRIVATE ROOM

## 2017-07-07 PROCEDURE — 95951 PR EEG MONITORING/VIDEORECORD: CPT | Mod: 26,,, | Performed by: PSYCHIATRY & NEUROLOGY

## 2017-07-07 PROCEDURE — 63600175 PHARM REV CODE 636 W HCPCS: Performed by: PHYSICIAN ASSISTANT

## 2017-07-07 PROCEDURE — 25000003 PHARM REV CODE 250: Performed by: PHYSICIAN ASSISTANT

## 2017-07-07 PROCEDURE — 95951 HC EEG MONITORING/VIDEO RECORD: CPT

## 2017-07-07 PROCEDURE — 99233 SBSQ HOSP IP/OBS HIGH 50: CPT | Mod: ,,, | Performed by: PSYCHIATRY & NEUROLOGY

## 2017-07-07 PROCEDURE — 95957 EEG DIGITAL ANALYSIS: CPT

## 2017-07-07 RX ORDER — DIPHENHYDRAMINE HCL 50 MG
50 CAPSULE ORAL ONCE
Status: COMPLETED | OUTPATIENT
Start: 2017-07-08 | End: 2017-07-08

## 2017-07-07 RX ORDER — TRAMADOL HYDROCHLORIDE 50 MG/1
100 TABLET ORAL ONCE
Status: COMPLETED | OUTPATIENT
Start: 2017-07-08 | End: 2017-07-08

## 2017-07-07 RX ORDER — QUETIAPINE FUMARATE 25 MG/1
25 TABLET, FILM COATED ORAL NIGHTLY
Status: DISCONTINUED | OUTPATIENT
Start: 2017-07-08 | End: 2017-07-10 | Stop reason: HOSPADM

## 2017-07-07 RX ADMIN — ATORVASTATIN CALCIUM 20 MG: 20 TABLET, FILM COATED ORAL at 09:07

## 2017-07-07 RX ADMIN — ACETAMINOPHEN 650 MG: 325 TABLET ORAL at 12:07

## 2017-07-07 RX ADMIN — ENOXAPARIN SODIUM 40 MG: 100 INJECTION SUBCUTANEOUS at 05:07

## 2017-07-07 RX ADMIN — Medication 3 ML: at 09:07

## 2017-07-07 RX ADMIN — Medication 3 ML: at 02:07

## 2017-07-07 RX ADMIN — LOSARTAN POTASSIUM 100 MG: 50 TABLET, FILM COATED ORAL at 09:07

## 2017-07-07 RX ADMIN — PROPRANOLOL HYDROCHLORIDE 20 MG: 10 TABLET ORAL at 09:07

## 2017-07-07 RX ADMIN — ACETAZOLAMIDE 500 MG: 500 CAPSULE, EXTENDED RELEASE ORAL at 09:07

## 2017-07-07 RX ADMIN — MONTELUKAST SODIUM 10 MG: 10 TABLET, FILM COATED ORAL at 09:07

## 2017-07-07 RX ADMIN — PANTOPRAZOLE SODIUM 40 MG: 40 TABLET, DELAYED RELEASE ORAL at 10:07

## 2017-07-07 RX ADMIN — ACETAZOLAMIDE 500 MG: 500 CAPSULE, EXTENDED RELEASE ORAL at 12:07

## 2017-07-07 RX ADMIN — NICOTINE 1 PATCH: 21 PATCH, EXTENDED RELEASE TRANSDERMAL at 10:07

## 2017-07-07 NOTE — NURSING
Called central tele to request continuous pulse ox attachment.    0828: Spoke to MERA Zhou PA-C re: awaiting continuous pulse ox from central tele. Made her aware that tele stated they would send sensor up when they have one available. States to place pt on continuous pulse ox as soon as sensor becomes available. Will continue to monitor.

## 2017-07-07 NOTE — PROGRESS NOTES
[facility]  Neurology-Epilepsy  Progress Note    Patient Name: Yanni Kaiser  MRN: [id number]  Admission Date: 7/6/2017  Hospital Length of Stay: 1 days  Code Status: Full Code   Attending Provider: VERENA Reed MD  Primary Care Physician: Carlyle Gordillo MD   Principal Problem:Localz-rltd symptomatic epilepsy w cmplx part sz, intract, wo status    Subjective:     Hospital Course:   Patient admitted to EMU for event characterization. Topamax and gabapentin held on admit. NO evenst thus far. No seizures or epileptic discharges on EEG.    Interval History: No acute events overnight.     Current Facility-Administered Medications   Medication Dose Route Frequency Provider Last Rate Last Dose    acetaminophen tablet 650 mg  650 mg Oral Q4H PRN Nicolle Zhou PA-C   650 mg at 07/07/17 1204    acetaZOLAMIDE 12 hr capsule 500 mg  500 mg Oral BID Nicolle Zhou PA-C   500 mg at 07/07/17 1204    atorvastatin tablet 20 mg  20 mg Oral QHS MONIKA Wade-C   20 mg at 07/06/17 2226    dextrose 50% injection 12.5 g  12.5 g Intravenous PRN Nicolle Zhou PA-C        dextrose 50% injection 25 g  25 g Intravenous PRN Nicolle hZou PA-C        docusate sodium capsule 100 mg  100 mg Oral Daily PRN Nicolle Zhou PA-C        enoxaparin injection 40 mg  40 mg Subcutaneous Daily Nicolle Zhou PA-C        glucagon (human recombinant) injection 1 mg  1 mg Intramuscular PRN Nicolle Zhou PA-C        glucose chewable tablet 16 g  16 g Oral PRN Nicolle Zhou PA-C        glucose chewable tablet 24 g  24 g Oral PRN Nicolle Zhou PA-C        insulin aspart pen 0-5 Units  0-5 Units Subcutaneous QID (AC + HS) PRN Nicolle Zhou PA-C        losartan tablet 100 mg  100 mg Oral QHS Nicolle Zhou PA-C   100 mg at 07/06/17 2224    montelukast tablet 10 mg  10 mg Oral QHS Nicolle Zohu PA-C   10 mg at 07/06/17 2225    nicotine 21 mg/24 hr 1 patch  1  patch Transdermal Daily Nicolle Zhou PA-C   1 patch at 07/07/17 1017    ondansetron disintegrating tablet 8 mg  8 mg Oral Q8H PRN Nicolle Zhou PA-C        ondansetron injection 4 mg  4 mg Intravenous Q12H PRN Nicolle Zhou PA-C        pantoprazole EC tablet 40 mg  40 mg Oral Daily Nicolle Zhou PA-C   40 mg at 07/07/17 1016    propranolol tablet 20 mg  20 mg Oral QHS Nicolle Zhou PA-C   20 mg at 07/06/17 2225    quetiapine tablet 25 mg  25 mg Oral QHS Nicolle Zhou PA-C   25 mg at 07/06/17 2225    sodium chloride 0.9% flush 3 mL  3 mL Intravenous Q8H Nicolle Zhou PA-C        venlafaxine 24 hr capsule 75 mg  75 mg Oral QHS Nicolle Zhou PA-C   75 mg at 07/06/17 2225     Continuous Infusions:     Review of Systems   Constitutional: Negative for chills and fever.   Eyes: Negative for discharge and visual disturbance.   Respiratory: Negative for cough and shortness of breath.    Cardiovascular: Negative for chest pain and palpitations.   Neurological: Negative for dizziness, tremors, seizures, weakness and headaches.     Objective:     Vital Signs (Most Recent):  Temp: 98.1 °F (36.7 °C) (07/07/17 1204)  Pulse: 71 (07/07/17 1204)  Resp: 18 (07/07/17 1204)  BP: (!) 107/52 (07/07/17 1204)  SpO2: (!) 91 % (07/07/17 1204) Vital Signs (24h Range):  Temp:  [97.6 °F (36.4 °C)-98.6 °F (37 °C)] 98.1 °F (36.7 °C)  Pulse:  [55-87] 71  Resp:  [18-20] 18  SpO2:  [91 %-100 %] 91 %  BP: ()/(50-79) 107/52        There is no height or weight on file to calculate BMI.    Physical Exam   Constitutional: She is oriented to person, place, and time. She appears well-developed and well-nourished.   HENT:   Head: Normocephalic and atraumatic.   Eyes: EOM are normal. Pupils are equal, round, and reactive to light.   Cardiovascular: Normal rate and regular rhythm.    Pulmonary/Chest: Effort normal.   Neurological: She is alert and oriented to person, place, and time. She has a normal  Finger-Nose-Finger Test.   Psychiatric: Her speech is normal.   Nursing note and vitals reviewed.      NEUROLOGICAL EXAMINATION:     MENTAL STATUS   Oriented to person, place, and time.   Speech: speech is normal   Level of consciousness: alert    CRANIAL NERVES   Cranial nerves II through XII intact.     CN III, IV, VI   Pupils are equal, round, and reactive to light.  Extraocular motions are normal.     MOTOR EXAM   Muscle bulk: normal  Overall muscle tone: normal    Strength   Right neck flexion: 4/5  Left neck flexion: 4/5  Right neck extension: 4/5  Left neck extension: 4/5  Right deltoid: 4/5  Left deltoid: 4/5  Right biceps: 4/5  Left biceps: 4/5  Right triceps: 4/5  Left triceps: 4/5  Right wrist flexion: 4/5  Left wrist flexion: 4/5  Right wrist extension: 4/5  Left wrist extension: 4/5  Right interossei: 4/5  Left interossei: 4/5  Right abdominals: 4/5  Left abdominals: 4/5  Right iliopsoas: 4/5  Left iliopsoas: 4/5  Right quadriceps: 4/5  Left quadriceps: 4/5  Right hamstrin/5  Left hamstrin/5  Right glutei: 4/5  Left glutei: 4/5  Right anterior tibial: 4/5  Left anterior tibial: 4/5  Right posterior tibial: 4/5  Left posterior tibial: 4/5  Right peroneal: 4/5  Left peroneal: 4/5  Right gastroc: 4/5  Left gastroc: 4/5    SENSORY EXAM   Light touch normal.     GAIT AND COORDINATION     Gait  Gait: wide-based     Coordination   Finger to nose coordination: normal    Tremor   Resting tremor: absent  Intention tremor: absent  Action tremor: absent      Significant Labs:   CBC:   Recent Labs  Lab 17  1804   WBC 5.33   HGB 10.1*   HCT 33.3*        CMP:   Recent Labs  Lab 17  1804   GLU 95      K 3.6   *   CO2 24   BUN 12   CREATININE 1.1   CALCIUM 8.9   PROT 7.1   ALBUMIN 3.2*   BILITOT 0.4   ALKPHOS 77   AST 10   ALT 12   ANIONGAP 7*   EGFRNONAA >60.0       Significant Studies: EEG: I have reviewed all pertinent results/findings within the past 24 hours    Assessment and  Plan:     * Localz-rltd symptomatic epilepsy w cmplx part sz, intract, wo status    Focal seizures with rare secondary generalization vs non-epileptic events     EE/6-: no seizures, no epileptic discharges     - Continue VEEG  - Hold Topamax and gabapentin   - Seizure precautions  - Activation procedures per protocol - sleep deprivation tonight, benadryl 50 mg and tramadol 100 mg tomorrow morning   - IV Valium PRN for GTC greater than 5 min - hospital medicine to call epilepsy on call before administering            Type 2 diabetes mellitus with stage 3 chronic kidney disease, without long-term current use of insulin    - On Metformin 500 mg BID at home, holding will put on low dose sliding scale for now         GERD (gastroesophageal reflux disease)    - Continue home Protonix 40 mg daily        Hyperlipidemia    - Continue home Lipitor 20 mg QHS        Depression    - Holding home Seroquel 25 mg QHS for sleep deprivation tonight        Essential hypertension    - Continue home propranolol 20 mg QHS  - Continue home losartan 100 mg QHS        Migraine    - Holding Topamax for EEG monitoring  - Continue Effexor 75 mg QHS  - Continue Diamox 500 mg BID        SHARATH (obstructive sleep apnea)    - Patient not on CPAP at home            VTE Risk Mitigation         Ordered     enoxaparin injection 40 mg  Daily     Route:  Subcutaneous        17     Medium Risk of VTE  Once      17 1746     Place DANA hose  Until discontinued      17 174     Place sequential compression device  Until discontinued      17          Nicolle Zhou PA-C  Neurology-Epilepsy  [facility]

## 2017-07-07 NOTE — ASSESSMENT & PLAN NOTE
Focal seizures with rare secondary generalization vs non-epileptic events     EE/6-: no seizures, no epileptic discharges     - Continue VEEG  - Hold Topamax and gabapentin   - Seizure precautions  - Activation procedures per protocol - sleep deprivation tonight, benadryl 50 mg and tramadol 100 mg tomorrow morning   - IV Valium PRN for GTC greater than 5 min - hospital medicine to call epilepsy on call before administering

## 2017-07-07 NOTE — SUBJECTIVE & OBJECTIVE
Interval History: No acute events overnight.     Current Facility-Administered Medications   Medication Dose Route Frequency Provider Last Rate Last Dose    acetaminophen tablet 650 mg  650 mg Oral Q4H PRN Nicolle Zhou PA-C   650 mg at 07/07/17 1204    acetaZOLAMIDE 12 hr capsule 500 mg  500 mg Oral BID Nicolle Zhou PA-C   500 mg at 07/07/17 1204    atorvastatin tablet 20 mg  20 mg Oral QHS Nicolle Zhou PA-C   20 mg at 07/06/17 2226    dextrose 50% injection 12.5 g  12.5 g Intravenous PRN Nicolle Zhou PA-C        dextrose 50% injection 25 g  25 g Intravenous PRN Nicolle Zhou PA-C        docusate sodium capsule 100 mg  100 mg Oral Daily PRN Nicolle Zhou PA-C        enoxaparin injection 40 mg  40 mg Subcutaneous Daily Nicolle Zhou PA-C        glucagon (human recombinant) injection 1 mg  1 mg Intramuscular PRN Nicolle Zhou PA-C        glucose chewable tablet 16 g  16 g Oral PRN Nicolle Zhou PA-C        glucose chewable tablet 24 g  24 g Oral PRN Nicolle Zhou PA-C        insulin aspart pen 0-5 Units  0-5 Units Subcutaneous QID (AC + HS) PRN Nicolle Zhou PA-C        losartan tablet 100 mg  100 mg Oral QHS Nicolle Zhou PA-C   100 mg at 07/06/17 2224    montelukast tablet 10 mg  10 mg Oral QHS Nicolle Zhou PA-C   10 mg at 07/06/17 2225    nicotine 21 mg/24 hr 1 patch  1 patch Transdermal Daily Nicolle Zhou PA-C   1 patch at 07/07/17 1017    ondansetron disintegrating tablet 8 mg  8 mg Oral Q8H PRN Nicolle Zhou PA-C        ondansetron injection 4 mg  4 mg Intravenous Q12H PRN Nicolle Zhou PA-C        pantoprazole EC tablet 40 mg  40 mg Oral Daily Nicolle Zhou PA-C   40 mg at 07/07/17 1016    propranolol tablet 20 mg  20 mg Oral QHS Nicolle Zhou PA-C   20 mg at 07/06/17 2225    quetiapine tablet 25 mg  25 mg Oral QHS Nicolle Zhou PA-C   25 mg at 07/06/17 7609    sodium chloride 0.9% flush 3 mL   3 mL Intravenous Q8H Nicolle Zhou PA-C        venlafaxine 24 hr capsule 75 mg  75 mg Oral QHS Nicolle Zhou PA-C   75 mg at 07/06/17 2225     Continuous Infusions:     Review of Systems   Constitutional: Negative for chills and fever.   Eyes: Negative for discharge and visual disturbance.   Respiratory: Negative for cough and shortness of breath.    Cardiovascular: Negative for chest pain and palpitations.   Neurological: Negative for dizziness, tremors, seizures, weakness and headaches.     Objective:     Vital Signs (Most Recent):  Temp: 98.1 °F (36.7 °C) (07/07/17 1204)  Pulse: 71 (07/07/17 1204)  Resp: 18 (07/07/17 1204)  BP: (!) 107/52 (07/07/17 1204)  SpO2: (!) 91 % (07/07/17 1204) Vital Signs (24h Range):  Temp:  [97.6 °F (36.4 °C)-98.6 °F (37 °C)] 98.1 °F (36.7 °C)  Pulse:  [55-87] 71  Resp:  [18-20] 18  SpO2:  [91 %-100 %] 91 %  BP: ()/(50-79) 107/52        There is no height or weight on file to calculate BMI.    Physical Exam   Constitutional: She is oriented to person, place, and time. She appears well-developed and well-nourished.   HENT:   Head: Normocephalic and atraumatic.   Eyes: EOM are normal. Pupils are equal, round, and reactive to light.   Cardiovascular: Normal rate and regular rhythm.    Pulmonary/Chest: Effort normal.   Neurological: She is alert and oriented to person, place, and time. She has a normal Finger-Nose-Finger Test.   Psychiatric: Her speech is normal.   Nursing note and vitals reviewed.      NEUROLOGICAL EXAMINATION:     MENTAL STATUS   Oriented to person, place, and time.   Speech: speech is normal   Level of consciousness: alert    CRANIAL NERVES   Cranial nerves II through XII intact.     CN III, IV, VI   Pupils are equal, round, and reactive to light.  Extraocular motions are normal.     MOTOR EXAM   Muscle bulk: normal  Overall muscle tone: normal    Strength   Right neck flexion: 4/5  Left neck flexion: 4/5  Right neck extension: 4/5  Left neck  extension: 4/5  Right deltoid: 4/5  Left deltoid: 4/5  Right biceps: 4/5  Left biceps: 4/5  Right triceps: 4/5  Left triceps: 4/5  Right wrist flexion: 4/5  Left wrist flexion: 4/5  Right wrist extension: 4/5  Left wrist extension: 4/5  Right interossei: 4/5  Left interossei: 4/5  Right abdominals: 4/5  Left abdominals: 4/5  Right iliopsoas: 4/5  Left iliopsoas: 4/5  Right quadriceps: 4/5  Left quadriceps: 4/5  Right hamstrin/5  Left hamstrin/5  Right glutei: 4/5  Left glutei: 4/5  Right anterior tibial: 4/5  Left anterior tibial: 4/5  Right posterior tibial: 4/5  Left posterior tibial: 4/5  Right peroneal: 4/5  Left peroneal: 4/5  Right gastroc: 4/5  Left gastroc: 4/5    SENSORY EXAM   Light touch normal.     GAIT AND COORDINATION     Gait  Gait: wide-based     Coordination   Finger to nose coordination: normal    Tremor   Resting tremor: absent  Intention tremor: absent  Action tremor: absent      Significant Labs:   CBC:   Recent Labs  Lab 17  1804   WBC 5.33   HGB 10.1*   HCT 33.3*        CMP:   Recent Labs  Lab 17  1804   GLU 95      K 3.6   *   CO2 24   BUN 12   CREATININE 1.1   CALCIUM 8.9   PROT 7.1   ALBUMIN 3.2*   BILITOT 0.4   ALKPHOS 77   AST 10   ALT 12   ANIONGAP 7*   EGFRNONAA >60.0       Significant Studies: EEG: I have reviewed all pertinent results/findings within the past 24 hours

## 2017-07-07 NOTE — NURSING
Spoke to MD with epilepsy re: pt's  requesting to see MDs. States he will made MICA Valverde, aware. WCTM.

## 2017-07-07 NOTE — PLAN OF CARE
Problem: Patient Care Overview  Goal: Plan of Care Review  Outcome: Ongoing (interventions implemented as appropriate)  Pt on continuous eeg monitoring, no complaints at this time

## 2017-07-07 NOTE — PLAN OF CARE
07/07/17 1114   Discharge Assessment   Assessment Type Discharge Planning Assessment   Confirmed/corrected address and phone number on facesheet? Yes   Assessment information obtained from? Patient   Expected Length of Stay (days) 3   Communicated expected length of stay with patient/caregiver yes   Prior to hospitilization cognitive status: Alert/Oriented   Prior to hospitalization functional status: Independent   Current cognitive status: Alert/Oriented   Current Functional Status: Independent   Arrived From admitted as an inpatient   Lives With spouse   Able to Return to Prior Arrangements yes   Is patient able to care for self after discharge? Yes   How many people do you have in your home that can help with your care after discharge? 1   Who are your caregiver(s) and their phone number(s)? Mathieu Kaiser, [phone]   Patient's perception of discharge disposition home or selfcare   Readmission Within The Last 30 Days no previous admission in last 30 days   Patient currently being followed by outpatient case management? No   Patient currently receives home health services? No   Does the patient currently use HME? No   Patient currently receives private duty nursing? N/A   Patient currently receives any other outside agency services? No   Equipment Currently Used at Home none   Do you have any problems affording any of your prescribed medications? No   Is the patient taking medications as prescribed? yes   Do you have any financial concerns preventing you from receiving the healthcare you need? No   Does the patient have transportation to healthcare appointments? Yes   Transportation Available family or friend will provide   On Dialysis? No   Does the patient receive services at the Coumadin Clinic? No   Are there any open cases? No   Discharge Plan A Home with family   Discharge Plan B Home with family;Home Health   Patient/Family In Agreement With Plan yes     Carlyle Gordillo MD  [id number] [place]  [id number]  Extended Emergency Contact Information  Primary Emergency Contact: Mathieu Kaiser  Address: [address] [place] [address] [address] [place] [id number] [place]  Home Phone: [phone]  Mobile Phone: [phone]  Relation: Spouse  Payor: [employer] MANAGED MEDICARE / Plan: [employer] MEDICARE HMO / Product Type: Capitation /   Future Appointments  Date Time Provider Department Center   7/17/2017 10:00 AM SERG ROTH [facility]

## 2017-07-08 LAB
POCT GLUCOSE: 101 MG/DL (ref 70–110)
POCT GLUCOSE: 127 MG/DL (ref 70–110)
POCT GLUCOSE: 86 MG/DL (ref 70–110)

## 2017-07-08 PROCEDURE — 25000003 PHARM REV CODE 250: Performed by: PHYSICIAN ASSISTANT

## 2017-07-08 PROCEDURE — 63600175 PHARM REV CODE 636 W HCPCS: Performed by: PHYSICIAN ASSISTANT

## 2017-07-08 PROCEDURE — 95957 EEG DIGITAL ANALYSIS: CPT

## 2017-07-08 PROCEDURE — 95951 HC EEG MONITORING/VIDEO RECORD: CPT

## 2017-07-08 PROCEDURE — 99233 SBSQ HOSP IP/OBS HIGH 50: CPT | Mod: ,,, | Performed by: PSYCHIATRY & NEUROLOGY

## 2017-07-08 PROCEDURE — 95951 PR EEG MONITORING/VIDEORECORD: CPT | Mod: 26,,, | Performed by: PSYCHIATRY & NEUROLOGY

## 2017-07-08 PROCEDURE — 20600001 HC STEP DOWN PRIVATE ROOM

## 2017-07-08 RX ADMIN — ENOXAPARIN SODIUM 40 MG: 100 INJECTION SUBCUTANEOUS at 04:07

## 2017-07-08 RX ADMIN — PANTOPRAZOLE SODIUM 40 MG: 40 TABLET, DELAYED RELEASE ORAL at 09:07

## 2017-07-08 RX ADMIN — ACETAMINOPHEN 650 MG: 325 TABLET ORAL at 09:07

## 2017-07-08 RX ADMIN — PROPRANOLOL HYDROCHLORIDE 20 MG: 10 TABLET ORAL at 09:07

## 2017-07-08 RX ADMIN — ATORVASTATIN CALCIUM 20 MG: 20 TABLET, FILM COATED ORAL at 09:07

## 2017-07-08 RX ADMIN — MONTELUKAST SODIUM 10 MG: 10 TABLET, FILM COATED ORAL at 09:07

## 2017-07-08 RX ADMIN — Medication 3 ML: at 07:07

## 2017-07-08 RX ADMIN — QUETIAPINE FUMARATE 25 MG: 25 TABLET, FILM COATED ORAL at 09:07

## 2017-07-08 RX ADMIN — DIPHENHYDRAMINE HYDROCHLORIDE 50 MG: 50 CAPSULE ORAL at 07:07

## 2017-07-08 RX ADMIN — TRAMADOL HYDROCHLORIDE 100 MG: 50 TABLET, FILM COATED ORAL at 07:07

## 2017-07-08 RX ADMIN — ACETAZOLAMIDE 500 MG: 500 CAPSULE, EXTENDED RELEASE ORAL at 09:07

## 2017-07-08 RX ADMIN — Medication 3 ML: at 04:07

## 2017-07-08 RX ADMIN — LOSARTAN POTASSIUM 100 MG: 50 TABLET, FILM COATED ORAL at 09:07

## 2017-07-08 RX ADMIN — ACETAMINOPHEN 650 MG: 325 TABLET ORAL at 04:07

## 2017-07-08 RX ADMIN — NICOTINE 1 PATCH: 21 PATCH, EXTENDED RELEASE TRANSDERMAL at 07:07

## 2017-07-08 RX ADMIN — VENLAFAXINE HYDROCHLORIDE 75 MG: 75 CAPSULE, EXTENDED RELEASE ORAL at 09:07

## 2017-07-08 RX ADMIN — Medication 3 ML: at 09:07

## 2017-07-08 NOTE — NURSING
Spoke to Dr. Fry re: if pt needs hospitalist consult while on epilepsy unit. MD states Epilepsy Team is managing all care for pt at this time. Also discussed if pt needs labs ordered, MD states not at this time. Will continue to monitor.

## 2017-07-08 NOTE — PLAN OF CARE
"Problem: Patient Care Overview  Goal: Plan of Care Review  Outcome: Ongoing (interventions implemented as appropriate)  POC reviewed with pt, pt verbalizes understanding. Pt aaox4, sleeping intermittently through the day, arousable to repeated voice and touch- baseline per pt's , who stated "I always have to shake her to wake her up." Attempted to keep pt awake this afternoon, pt ordered sleep deprivation til 0400. Pt aware. Pt with c/o HA this AM, pain controlled with tylenol per prn order. Pt on tele, SB in the 50s-NSR. Pt on RA. Pt tolerating diabetic diet, CBGs AC&HS. Pt with IV to L arm SL. Pt voiding in toilet, LBM yesterday per pt. Pt OOB with SBA. Pt currently resting in bed, call bell within reach,  at bedside, bed alarm on, will continue to monitor.      "

## 2017-07-08 NOTE — SUBJECTIVE & OBJECTIVE
Interval History: Was sleep deprived last night. Patient denies any acute events overnight.      Current Facility-Administered Medications   Medication Dose Route Frequency Provider Last Rate Last Dose    acetaminophen tablet 650 mg  650 mg Oral Q4H PRN Nicolle Zhou PA-C   650 mg at 07/08/17 0948    acetaZOLAMIDE 12 hr capsule 500 mg  500 mg Oral BID Nicolle Zhou PA-C   500 mg at 07/07/17 2142    atorvastatin tablet 20 mg  20 mg Oral QHS Nicolle Zhou PA-C   20 mg at 07/07/17 2141    dextrose 50% injection 12.5 g  12.5 g Intravenous PRN Nicolle Zhou PA-C        dextrose 50% injection 25 g  25 g Intravenous PRN Nicolle Zhou PA-C        docusate sodium capsule 100 mg  100 mg Oral Daily PRN Nicolle Zhou PA-C        enoxaparin injection 40 mg  40 mg Subcutaneous Daily Nicolle Zhou PA-C   40 mg at 07/07/17 1759    glucagon (human recombinant) injection 1 mg  1 mg Intramuscular PRN Nicolle Zhou PA-C        glucose chewable tablet 16 g  16 g Oral PRN Nicolle Zhou PA-C        glucose chewable tablet 24 g  24 g Oral PRN Nicolle Zhou PA-C        insulin aspart pen 0-5 Units  0-5 Units Subcutaneous QID (AC + HS) PRN Nicolle Zhou PA-C        losartan tablet 100 mg  100 mg Oral QHS Nicolle Zhou PA-C   100 mg at 07/07/17 2142    montelukast tablet 10 mg  10 mg Oral QHS Nicolle Zhou PA-C   10 mg at 07/07/17 2142    nicotine 21 mg/24 hr 1 patch  1 patch Transdermal Daily Nicolle Zhou PA-C   1 patch at 07/08/17 0737    ondansetron disintegrating tablet 8 mg  8 mg Oral Q8H PRN Nicolle Zhou PA-C        ondansetron injection 4 mg  4 mg Intravenous Q12H PRN Nicolle Zhou PA-C        pantoprazole EC tablet 40 mg  40 mg Oral Daily Nicolle Zhou PA-C   40 mg at 07/08/17 0948    propranolol tablet 20 mg  20 mg Oral QHS Nicolle Zhou PA-C   20 mg at 07/07/17 2142    quetiapine tablet 25 mg  25 mg Oral QHS Nicolle PETTIT  MICA Zhou        sodium chloride 0.9% flush 3 mL  3 mL Intravenous Q8H Nicolle E. MICA Zhou   3 mL at 07/08/17 0703    venlafaxine 24 hr capsule 75 mg  75 mg Oral QHS Nicolle PETTIT MICA Zhou   75 mg at 07/06/17 2225     Continuous Infusions:     Review of Systems   Constitutional: Negative for chills and fever.   Eyes: Negative for discharge and visual disturbance.   Respiratory: Negative for cough and shortness of breath.    Cardiovascular: Negative for chest pain and palpitations.   Neurological: Negative for dizziness, tremors, seizures, weakness and headaches.     Objective:     Vital Signs (Most Recent):  Temp: 97.9 °F (36.6 °C) (07/08/17 0723)  Pulse: (!) 53 (07/08/17 1100)  Resp: 14 (07/08/17 0723)  BP: 135/86 (07/08/17 0723)  SpO2: 96 % (07/08/17 1100) Vital Signs (24h Range):  Temp:  [97.9 °F (36.6 °C)-99 °F (37.2 °C)] 97.9 °F (36.6 °C)  Pulse:  [53-71] 53  Resp:  [14-18] 14  SpO2:  [91 %-100 %] 96 %  BP: (102-135)/(52-86) 135/86        There is no height or weight on file to calculate BMI.    Physical Exam   Constitutional: She is oriented to person, place, and time. She appears well-developed and well-nourished.   HENT:   Head: Normocephalic and atraumatic.   Eyes: EOM are normal. Pupils are equal, round, and reactive to light.   Cardiovascular: Normal rate and regular rhythm.    Pulmonary/Chest: Effort normal.   Neurological: She is alert and oriented to person, place, and time. She has normal strength. She has a normal Finger-Nose-Finger Test.   Psychiatric: Her speech is normal.   Nursing note and vitals reviewed.      NEUROLOGICAL EXAMINATION:     MENTAL STATUS   Oriented to person, place, and time.   Speech: speech is normal   Level of consciousness: alert    CRANIAL NERVES   Cranial nerves II through XII intact.     CN III, IV, VI   Pupils are equal, round, and reactive to light.  Extraocular motions are normal.     MOTOR EXAM   Muscle bulk: normal  Overall muscle tone: normal    Strength    Strength 5/5 throughout.   Right neck flexion: 4/5  Left neck flexion: 4/5  Right neck extension: 4/5  Left neck extension: 4/5  Right deltoid: 4/5  Left deltoid: 4/5  Right biceps: 4/5  Left biceps: 4/5  Right triceps: 4/5  Left triceps: 4/5  Right wrist flexion: 4/5  Left wrist flexion: 4/5  Right wrist extension: 4/5  Left wrist extension: 4/5  Right interossei: 4/5  Left interossei: 4/5  Right abdominals: 4/5  Left abdominals: 4/5  Right iliopsoas: 4/5  Left iliopsoas: 4/5  Right quadriceps: 4/5  Left quadriceps: 4/5  Right hamstrin/5  Left hamstrin/5  Right glutei: 4/5  Left glutei: 4/5  Right anterior tibial: 4/5  Left anterior tibial: 4/5  Right posterior tibial: 4/5  Left posterior tibial: 4/5  Right peroneal: 4/5  Left peroneal: 4/5  Right gastroc: 4/5  Left gastroc: 4/5       Effort based motor exam.      SENSORY EXAM   Light touch normal.     GAIT AND COORDINATION     Gait  Gait: wide-based     Coordination   Finger to nose coordination: normal    Tremor   Resting tremor: absent  Intention tremor: absent  Action tremor: absent      Significant Labs:   Recent Lab Results       17  0729 17  2150 17  1758 17  1318      POCT Glucose 101 90 138(H) 100         All pertinent lab results from the past 24 hours have been reviewed.    Significant Studies: I have reviewed all pertinent imaging results/findings within the past 24 hours.

## 2017-07-08 NOTE — ASSESSMENT & PLAN NOTE
Focal seizures with rare secondary generalization vs non-epileptic events     EE/6-: no seizures, no epileptic discharges   - no seizures    - Continue VEEG  - Hold Topamax and gabapentin   - Seizure precautions  - IV Valium PRN for GTC greater than 5 min - hospital medicine to call epilepsy on call before administering

## 2017-07-08 NOTE — PROGRESS NOTES
[facility]  Neurology-Epilepsy  Progress Note    Patient Name: Yanni Kaiser  MRN: [id number]  Admission Date: 7/6/2017  Hospital Length of Stay: 2 days  Code Status: Full Code   Attending Provider: VERENA Reed MD  Primary Care Physician: Carlyle Gordillo MD   Principal Problem:Localz-rltd symptomatic epilepsy w cmplx part sz, intract, wo status    Subjective:     Hospital Course:   Patient admitted to EMU for event characterization. Topamax and gabapentin held on admit. NO events thus far. No seizures or epileptic discharges on EEG.    Interval History: Was sleep deprived last night. Patient denies any acute events overnight.      Current Facility-Administered Medications   Medication Dose Route Frequency Provider Last Rate Last Dose    acetaminophen tablet 650 mg  650 mg Oral Q4H PRN Nicolle Zhou PA-C   650 mg at 07/08/17 0948    acetaZOLAMIDE 12 hr capsule 500 mg  500 mg Oral BID Nicolle Zhou PA-C   500 mg at 07/07/17 2142    atorvastatin tablet 20 mg  20 mg Oral QHS MONIKA Wade-PINEDA   20 mg at 07/07/17 2141    dextrose 50% injection 12.5 g  12.5 g Intravenous PRN Nicolle Zhou PA-C        dextrose 50% injection 25 g  25 g Intravenous PRN Nicolle Zhou PA-C        docusate sodium capsule 100 mg  100 mg Oral Daily PRN Nicolle Zhou PA-C        enoxaparin injection 40 mg  40 mg Subcutaneous Daily Nicolle Zhou PA-C   40 mg at 07/07/17 1759    glucagon (human recombinant) injection 1 mg  1 mg Intramuscular PRN Nicolle Zhou PA-C        glucose chewable tablet 16 g  16 g Oral PRN Nicolle Zhou PA-C        glucose chewable tablet 24 g  24 g Oral PRN Nicolle Zhou PA-C        insulin aspart pen 0-5 Units  0-5 Units Subcutaneous QID (AC + HS) PRN Nicolle Zhou PA-C        losartan tablet 100 mg  100 mg Oral QHS Nicolle Zhou PA-C   100 mg at 07/07/17 2142    montelukast tablet 10 mg  10 mg Oral QHS Nicolle PETTIT  MICA Zhou   10 mg at 07/07/17 2142    nicotine 21 mg/24 hr 1 patch  1 patch Transdermal Daily Nicolle Zhou PA-C   1 patch at 07/08/17 0737    ondansetron disintegrating tablet 8 mg  8 mg Oral Q8H PRN Nicolle Zhou PA-C        ondansetron injection 4 mg  4 mg Intravenous Q12H PRN Nicolle Zhou PA-C        pantoprazole EC tablet 40 mg  40 mg Oral Daily Nicolle Zhou PA-C   40 mg at 07/08/17 0948    propranolol tablet 20 mg  20 mg Oral QHS Nicolle Zhou PA-C   20 mg at 07/07/17 2142    quetiapine tablet 25 mg  25 mg Oral QHS Nicolle Zhou PA-C        sodium chloride 0.9% flush 3 mL  3 mL Intravenous Q8H Nicolle Zhou PA-C   3 mL at 07/08/17 0703    venlafaxine 24 hr capsule 75 mg  75 mg Oral QHS Nicolle Zhou PA-C   75 mg at 07/06/17 2225     Continuous Infusions:     Review of Systems   Constitutional: Negative for chills and fever.   Eyes: Negative for discharge and visual disturbance.   Respiratory: Negative for cough and shortness of breath.    Cardiovascular: Negative for chest pain and palpitations.   Neurological: Negative for dizziness, tremors, seizures, weakness and headaches.     Objective:     Vital Signs (Most Recent):  Temp: 97.9 °F (36.6 °C) (07/08/17 0723)  Pulse: (!) 53 (07/08/17 1100)  Resp: 14 (07/08/17 0723)  BP: 135/86 (07/08/17 0723)  SpO2: 96 % (07/08/17 1100) Vital Signs (24h Range):  Temp:  [97.9 °F (36.6 °C)-99 °F (37.2 °C)] 97.9 °F (36.6 °C)  Pulse:  [53-71] 53  Resp:  [14-18] 14  SpO2:  [91 %-100 %] 96 %  BP: (102-135)/(52-86) 135/86        There is no height or weight on file to calculate BMI.    Physical Exam   Constitutional: She is oriented to person, place, and time. She appears well-developed and well-nourished.   HENT:   Head: Normocephalic and atraumatic.   Eyes: EOM are normal. Pupils are equal, round, and reactive to light.   Cardiovascular: Normal rate and regular rhythm.    Pulmonary/Chest: Effort normal.   Neurological: She is  alert and oriented to person, place, and time. She has normal strength. She has a normal Finger-Nose-Finger Test.   Psychiatric: Her speech is normal.   Nursing note and vitals reviewed.      NEUROLOGICAL EXAMINATION:     MENTAL STATUS   Oriented to person, place, and time.   Speech: speech is normal   Level of consciousness: alert    CRANIAL NERVES   Cranial nerves II through XII intact.     CN III, IV, VI   Pupils are equal, round, and reactive to light.  Extraocular motions are normal.     MOTOR EXAM   Muscle bulk: normal  Overall muscle tone: normal    Strength   Strength 5/5 throughout.   Right neck flexion: 4/5  Left neck flexion: 4/5  Right neck extension: 4/5  Left neck extension: 4/5  Right deltoid: 4/5  Left deltoid: 4/5  Right biceps: 4/5  Left biceps: 4/5  Right triceps: 4/5  Left triceps: 4/5  Right wrist flexion: 4/5  Left wrist flexion: 4/5  Right wrist extension: 4/5  Left wrist extension: 4/5  Right interossei: 4/5  Left interossei: 4/5  Right abdominals: 4/5  Left abdominals: 4/5  Right iliopsoas: 4/5  Left iliopsoas: 4/5  Right quadriceps: 4/5  Left quadriceps: 4/5  Right hamstrin/5  Left hamstrin/5  Right glutei: 4/5  Left glutei: 4/5  Right anterior tibial: 4/5  Left anterior tibial: 4/5  Right posterior tibial: 4/5  Left posterior tibial: 4/5  Right peroneal: 4/5  Left peroneal: 4/5  Right gastroc: 4/5  Left gastroc: 4/5       Effort based motor exam.      SENSORY EXAM   Light touch normal.     GAIT AND COORDINATION     Gait  Gait: wide-based     Coordination   Finger to nose coordination: normal    Tremor   Resting tremor: absent  Intention tremor: absent  Action tremor: absent      Significant Labs:   Recent Lab Results       17  0729 17  2150 17  1758 17  1318      POCT Glucose 101 90 138(H) 100         All pertinent lab results from the past 24 hours have been reviewed.    Significant Studies: I have reviewed all pertinent imaging results/findings within the  past 24 hours.    Assessment and Plan:     Type 2 diabetes mellitus with stage 3 chronic kidney disease, without long-term current use of insulin    - On Metformin 500 mg BID at home, holding will put on low dose sliding scale for now         GERD (gastroesophageal reflux disease)    - Continue home Protonix 40 mg daily        Hyperlipidemia    - Continue home Lipitor 20 mg QHS        Depression    - Holding home Seroquel 25 mg QHS for sleep deprivation tonight        Essential hypertension    - Continue home propranolol 20 mg QHS  - Continue home losartan 100 mg QHS        Migraine    - Holding Topamax for EEG monitoring  - Continue Effexor 75 mg QHS  - Continue Diamox 500 mg BID        SHARATH (obstructive sleep apnea)    - Patient not on CPAP at home        * Localz-rltd symptomatic epilepsy w cmplx part sz, intract, wo status    Focal seizures with rare secondary generalization vs non-epileptic events     EE/6-: no seizures, no epileptic discharges   - no seizures    - Continue VEEG  - Hold Topamax and gabapentin   - Seizure precautions  - IV Valium PRN for GTC greater than 5 min - hospital medicine to call epilepsy on call before administering                VTE Risk Mitigation         Ordered     enoxaparin injection 40 mg  Daily     Route:  Subcutaneous        17     Medium Risk of VTE  Once      17 1746     Place DANA hose  Until discontinued      17 174     Place sequential compression device  Until discontinued      17 174          Herb Fry MD  Neurology-Epilepsy  [facility]

## 2017-07-09 LAB
POCT GLUCOSE: 109 MG/DL (ref 70–110)
POCT GLUCOSE: 96 MG/DL (ref 70–110)
POCT GLUCOSE: 99 MG/DL (ref 70–110)

## 2017-07-09 PROCEDURE — 95957 EEG DIGITAL ANALYSIS: CPT

## 2017-07-09 PROCEDURE — 99233 SBSQ HOSP IP/OBS HIGH 50: CPT | Mod: ,,, | Performed by: PSYCHIATRY & NEUROLOGY

## 2017-07-09 PROCEDURE — 95951 PR EEG MONITORING/VIDEORECORD: CPT | Mod: 26,,, | Performed by: PSYCHIATRY & NEUROLOGY

## 2017-07-09 PROCEDURE — 95951 HC EEG MONITORING/VIDEO RECORD: CPT

## 2017-07-09 PROCEDURE — 63600175 PHARM REV CODE 636 W HCPCS: Performed by: PHYSICIAN ASSISTANT

## 2017-07-09 PROCEDURE — 20600001 HC STEP DOWN PRIVATE ROOM

## 2017-07-09 PROCEDURE — 25000003 PHARM REV CODE 250: Performed by: PHYSICIAN ASSISTANT

## 2017-07-09 RX ADMIN — Medication 3 ML: at 10:07

## 2017-07-09 RX ADMIN — Medication 3 ML: at 06:07

## 2017-07-09 RX ADMIN — NICOTINE 1 PATCH: 21 PATCH, EXTENDED RELEASE TRANSDERMAL at 08:07

## 2017-07-09 RX ADMIN — ACETAZOLAMIDE 500 MG: 500 CAPSULE, EXTENDED RELEASE ORAL at 09:07

## 2017-07-09 RX ADMIN — PANTOPRAZOLE SODIUM 40 MG: 40 TABLET, DELAYED RELEASE ORAL at 08:07

## 2017-07-09 RX ADMIN — LOSARTAN POTASSIUM 100 MG: 50 TABLET, FILM COATED ORAL at 09:07

## 2017-07-09 RX ADMIN — QUETIAPINE FUMARATE 25 MG: 25 TABLET, FILM COATED ORAL at 09:07

## 2017-07-09 RX ADMIN — ACETAZOLAMIDE 500 MG: 500 CAPSULE, EXTENDED RELEASE ORAL at 10:07

## 2017-07-09 RX ADMIN — ATORVASTATIN CALCIUM 20 MG: 20 TABLET, FILM COATED ORAL at 09:07

## 2017-07-09 RX ADMIN — ENOXAPARIN SODIUM 40 MG: 100 INJECTION SUBCUTANEOUS at 05:07

## 2017-07-09 RX ADMIN — VENLAFAXINE HYDROCHLORIDE 75 MG: 75 CAPSULE, EXTENDED RELEASE ORAL at 09:07

## 2017-07-09 RX ADMIN — Medication 3 ML: at 03:07

## 2017-07-09 NOTE — PROGRESS NOTES
[facility]  Neurology-Epilepsy  Progress Note    Patient Name: Yanni Kaiser  MRN: [id number]  Admission Date: 7/6/2017  Hospital Length of Stay: 3 days  Code Status: Full Code   Attending Provider: VERENA Reed MD  Primary Care Physician: Carlyle Gordillo MD   Principal Problem:Localz-rltd symptomatic epilepsy w cmplx part sz, intract, wo status    Subjective:     Hospital Course:   Patient admitted to EMU for event characterization. Topamax and gabapentin held on admit. NO events thus far. No seizures or epileptic discharges on EEG.  7/7-7/8- No events- sleep deprived  7/8-7/9- no events    Interval History: Patient resting comfortably in bed. No events concerning for seizure    Current Facility-Administered Medications   Medication Dose Route Frequency Provider Last Rate Last Dose    acetaminophen tablet 650 mg  650 mg Oral Q4H PRN Nicolle Zhou PA-C   650 mg at 07/08/17 1622    acetaZOLAMIDE 12 hr capsule 500 mg  500 mg Oral BID Nicolle Zhou PA-C   500 mg at 07/09/17 1000    atorvastatin tablet 20 mg  20 mg Oral QHS Nicolle Zhou PA-C   20 mg at 07/08/17 2145    dextrose 50% injection 12.5 g  12.5 g Intravenous PRN Nicolle Zhou PA-C        dextrose 50% injection 25 g  25 g Intravenous PRN Nicolle Zhou PA-C        docusate sodium capsule 100 mg  100 mg Oral Daily PRN Nicolle Zhou PA-C        enoxaparin injection 40 mg  40 mg Subcutaneous Daily Nicolle Zhou PA-C   40 mg at 07/08/17 1623    glucagon (human recombinant) injection 1 mg  1 mg Intramuscular PRN Nicolle Zhou PA-C        glucose chewable tablet 16 g  16 g Oral PRN Nicolle Zhou PA-C        glucose chewable tablet 24 g  24 g Oral PRN Nicolle Zhou PA-C        insulin aspart pen 0-5 Units  0-5 Units Subcutaneous QID (AC + HS) PRN Nicolle Zhou PA-C        losartan tablet 100 mg  100 mg Oral QHS Nicolle Zhou PA-C   100 mg at 07/08/17 2145     montelukast tablet 10 mg  10 mg Oral QHS Nicolle Zhou PA-C   10 mg at 07/08/17 2145    nicotine 21 mg/24 hr 1 patch  1 patch Transdermal Daily Nicolle Zhou PA-C   1 patch at 07/09/17 0829    ondansetron disintegrating tablet 8 mg  8 mg Oral Q8H PRN Nicolle Zhou PA-C        ondansetron injection 4 mg  4 mg Intravenous Q12H PRN Nicolle Zhou PA-C        pantoprazole EC tablet 40 mg  40 mg Oral Daily Nicolle Zhou PA-C   40 mg at 07/09/17 0829    propranolol tablet 20 mg  20 mg Oral QHS Nicolle Zhou PA-C   20 mg at 07/08/17 2145    quetiapine tablet 25 mg  25 mg Oral QHS Nicolle Zhou PA-C   25 mg at 07/08/17 2145    sodium chloride 0.9% flush 3 mL  3 mL Intravenous Q8H Nicolle Zhou PA-C   3 mL at 07/09/17 0622    venlafaxine 24 hr capsule 75 mg  75 mg Oral QHS Nicolle Zhou PA-C   75 mg at 07/08/17 2145     Continuous Infusions:     Review of Systems  Objective:     Vital Signs (Most Recent):  Temp: 96.7 °F (35.9 °C) (07/09/17 0800)  Pulse: (!) 58 (07/09/17 0800)  Resp: 16 (07/09/17 0800)  BP: 135/68 (07/09/17 0800)  SpO2: 97 % (07/09/17 0800) Vital Signs (24h Range):  Temp:  [96.7 °F (35.9 °C)-98.5 °F (36.9 °C)] 96.7 °F (35.9 °C)  Pulse:  [53-69] 58  Resp:  [16-18] 16  SpO2:  [94 %-98 %] 97 %  BP: (124-166)/(58-96) 135/68        There is no height or weight on file to calculate BMI.    Physical Exam        Constitutional  Well-developed, well-nourished, appears stated age, drowsy this am.       Cardiovascular  Radial pulses 2+ and symmetric, no LE edema bilaterally   Neurological    * Mental status      - Orientation  Oriented to person, place, time, and situation     - Memory   Intact recent and remote     - Attention/concentration  Attentive, vigilant during exam     - Language  Naming & repetition intact, +2-step commands     - Fund of knowledge  Aware of current events     - Executive  Well-organized thoughts     - Other                                          * Motor  Muscle bulk normal, strength 5/5 throughout   * Sensory   Intact to temperature and vibration throughout   * Coordination  No dysmetria with finger-to-nose or heel-to-shin   * Gait  did not test   * Deep tendon reflexes  2+ and symmetric throughout          Significant Labs: None    Significant Studies: I have reviewed all pertinent imaging results/findings within the past 24 hours.    Assessment and Plan:     Type 2 diabetes mellitus with stage 3 chronic kidney disease, without long-term current use of insulin    - On Metformin 500 mg BID at home, holding will put on low dose sliding scale for now         GERD (gastroesophageal reflux disease)    - Continue home Protonix 40 mg daily        Hyperlipidemia    - Continue home Lipitor 20 mg QHS        Depression    - Holding home Seroquel 25 mg QHS for sleep deprivation tonight        Essential hypertension    - Continue home propranolol 20 mg QHS  - Continue home losartan 100 mg QHS        Migraine    - Holding Topamax for EEG monitoring  - Continue Effexor 75 mg QHS  - Continue Diamox 500 mg BID        SHARATH (obstructive sleep apnea)    - Patient not on CPAP at home        * Localz-rltd symptomatic epilepsy w cmplx part sz, intract, wo status    Focal seizures with rare secondary generalization vs non-epileptic events     EE/6-: no seizures, no epileptic discharges   -- no seizures  -- no seizures    - Continue VEEG  - Hold Topamax and gabapentin   - Seizure precautions  - IV Valium PRN for GTC greater than 5 min - hospital medicine to call epilepsy on call before administering                VTE Risk Mitigation         Ordered     enoxaparin injection 40 mg  Daily     Route:  Subcutaneous        17     Medium Risk of VTE  Once      17 174     Place DANA hose  Until discontinued      17 174     Place sequential compression device  Until discontinued      17          Herb Fry,  MD  Neurology-Epilepsy  [facility]

## 2017-07-09 NOTE — ASSESSMENT & PLAN NOTE
Focal seizures with rare secondary generalization vs non-epileptic events     EE/6-: no seizures, no epileptic discharges   - no seizures  -- no seizures    - Continue VEEG  - Hold Topamax and gabapentin   - Seizure precautions  - IV Valium PRN for GTC greater than 5 min - hospital medicine to call epilepsy on call before administering

## 2017-07-09 NOTE — PLAN OF CARE
"Problem: Patient Care Overview  Goal: Plan of Care Review  Outcome: Ongoing (interventions implemented as appropriate)  POC reviewed with pt, pt verbalizes understanding. Pt aaox4, slept through most of the morning and early afternoon, arousable to repeated voice and touch- baseline per pt's , who stated yesterday "I always have to shake her to wake her up." Pt awake and alert this evening. No seizure events this shift. Pt with c/o HA, controlled with tylenol per prn order, [facility] care, room kept quiet and dark per pt request. Pt on tele, SB in the 50s-NSR. Pt on RA. Pt tolerating diabetic diet, CBGs AC&HS. Pt with IV to R arm SL. Pt voiding in toilet. Pt OOB with SBA. Pt currently resting in bed, call bell within reach, bed alarm on, will continue to monitor.      "

## 2017-07-09 NOTE — NURSING
Patient rounds made more than every hour.  No complaint of headaches or seizure activity this shift.  No complaints of cardiac or respiratory problems this shift.  Vital signs stable.  Afebrile.  Assisted to the bathroom several tomes this shift.  Awake most of the night playing games on her cell phone.  Cooperative throughout the shift.

## 2017-07-09 NOTE — SUBJECTIVE & OBJECTIVE
Interval History: Patient resting comfortably in bed. No events concerning for seizure    Current Facility-Administered Medications   Medication Dose Route Frequency Provider Last Rate Last Dose    acetaminophen tablet 650 mg  650 mg Oral Q4H PRN Nicolle Zhou PA-C   650 mg at 07/08/17 1622    acetaZOLAMIDE 12 hr capsule 500 mg  500 mg Oral BID Nicolle Zhou PA-C   500 mg at 07/09/17 1000    atorvastatin tablet 20 mg  20 mg Oral QHS Nicolle Zhou PA-C   20 mg at 07/08/17 2145    dextrose 50% injection 12.5 g  12.5 g Intravenous PRN Nicolle Zhou PA-C        dextrose 50% injection 25 g  25 g Intravenous PRN Nicolle Zhou PA-C        docusate sodium capsule 100 mg  100 mg Oral Daily PRN Nicolle Zhou PA-C        enoxaparin injection 40 mg  40 mg Subcutaneous Daily Nicolle Zhou PA-C   40 mg at 07/08/17 1623    glucagon (human recombinant) injection 1 mg  1 mg Intramuscular PRN Nicolle Zhou PA-C        glucose chewable tablet 16 g  16 g Oral PRN Nicolle Zhou PA-C        glucose chewable tablet 24 g  24 g Oral PRN Nicolle Zhou PA-C        insulin aspart pen 0-5 Units  0-5 Units Subcutaneous QID (AC + HS) PRN Nicolle Zhou PA-C        losartan tablet 100 mg  100 mg Oral QHS Nicolle Zhou PA-C   100 mg at 07/08/17 2145    montelukast tablet 10 mg  10 mg Oral QHS Nicolle Zhou PA-C   10 mg at 07/08/17 2145    nicotine 21 mg/24 hr 1 patch  1 patch Transdermal Daily Nicolle Zhou PA-C   1 patch at 07/09/17 0829    ondansetron disintegrating tablet 8 mg  8 mg Oral Q8H PRN Nicolle Zhou PA-C        ondansetron injection 4 mg  4 mg Intravenous Q12H PRN Nicolle Zhou PA-C        pantoprazole EC tablet 40 mg  40 mg Oral Daily Nicolle Zhou PA-C   40 mg at 07/09/17 0829    propranolol tablet 20 mg  20 mg Oral QHS Nicolle Zhou PA-C   20 mg at 07/08/17 2145    quetiapine tablet 25 mg  25 mg Oral QHS Nicolle Zhou,  PA-C   25 mg at 07/08/17 2145    sodium chloride 0.9% flush 3 mL  3 mL Intravenous Q8H Nicolle PETTIT Abelardo, PA-C   3 mL at 07/09/17 0622    venlafaxine 24 hr capsule 75 mg  75 mg Oral QHS Nicolle Zhou MICA   75 mg at 07/08/17 2145     Continuous Infusions:     Review of Systems  Objective:     Vital Signs (Most Recent):  Temp: 96.7 °F (35.9 °C) (07/09/17 0800)  Pulse: (!) 58 (07/09/17 0800)  Resp: 16 (07/09/17 0800)  BP: 135/68 (07/09/17 0800)  SpO2: 97 % (07/09/17 0800) Vital Signs (24h Range):  Temp:  [96.7 °F (35.9 °C)-98.5 °F (36.9 °C)] 96.7 °F (35.9 °C)  Pulse:  [53-69] 58  Resp:  [16-18] 16  SpO2:  [94 %-98 %] 97 %  BP: (124-166)/(58-96) 135/68        There is no height or weight on file to calculate BMI.    Physical Exam        Constitutional  Well-developed, well-nourished, appears stated age, drowsy this am.       Cardiovascular  Radial pulses 2+ and symmetric, no LE edema bilaterally   Neurological    * Mental status      - Orientation  Oriented to person, place, time, and situation     - Memory   Intact recent and remote     - Attention/concentration  Attentive, vigilant during exam     - Language  Naming & repetition intact, +2-step commands     - Fund of knowledge  Aware of current events     - Executive  Well-organized thoughts     - Other                                         * Motor  Muscle bulk normal, strength 5/5 throughout   * Sensory   Intact to temperature and vibration throughout   * Coordination  No dysmetria with finger-to-nose or heel-to-shin   * Gait  did not test   * Deep tendon reflexes  2+ and symmetric throughout          Significant Labs: None    Significant Studies: I have reviewed all pertinent imaging results/findings within the past 24 hours.

## 2017-07-10 VITALS
DIASTOLIC BLOOD PRESSURE: 69 MMHG | HEART RATE: 70 BPM | HEIGHT: 64 IN | OXYGEN SATURATION: 100 % | RESPIRATION RATE: 18 BRPM | WEIGHT: 293 LBS | SYSTOLIC BLOOD PRESSURE: 122 MMHG | BODY MASS INDEX: 50.02 KG/M2 | TEMPERATURE: 96 F

## 2017-07-10 PROBLEM — G40.219 LOCALZ-RLTD SYMPTOMATIC EPILEPSY W CMPLX PART SZ, INTRACT, WO STATUS: Status: ACTIVE | Noted: 2017-07-10

## 2017-07-10 LAB
POCT GLUCOSE: 101 MG/DL (ref 70–110)
POCT GLUCOSE: 108 MG/DL (ref 70–110)

## 2017-07-10 PROCEDURE — 99239 HOSP IP/OBS DSCHRG MGMT >30: CPT | Mod: ,,, | Performed by: PSYCHIATRY & NEUROLOGY

## 2017-07-10 PROCEDURE — 25000003 PHARM REV CODE 250: Performed by: PHYSICIAN ASSISTANT

## 2017-07-10 PROCEDURE — 95813 EEG EXTND MNTR 61-119 MIN: CPT | Mod: 26,,, | Performed by: PSYCHIATRY & NEUROLOGY

## 2017-07-10 RX ORDER — TOPIRAMATE 100 MG/1
300 TABLET, FILM COATED ORAL 2 TIMES DAILY
Qty: 90 TABLET | Refills: 11 | OUTPATIENT
Start: 2017-07-10 | End: 2018-08-09 | Stop reason: SDUPTHER

## 2017-07-10 RX ORDER — TOPIRAMATE 100 MG/1
300 TABLET, FILM COATED ORAL 2 TIMES DAILY
Qty: 90 TABLET | Refills: 11 | Status: SHIPPED | OUTPATIENT
Start: 2017-07-10 | End: 2017-07-10

## 2017-07-10 RX ADMIN — ACETAMINOPHEN 650 MG: 325 TABLET ORAL at 03:07

## 2017-07-10 RX ADMIN — Medication 3 ML: at 06:07

## 2017-07-10 RX ADMIN — NICOTINE 1 PATCH: 21 PATCH, EXTENDED RELEASE TRANSDERMAL at 09:07

## 2017-07-10 RX ADMIN — PANTOPRAZOLE SODIUM 40 MG: 40 TABLET, DELAYED RELEASE ORAL at 09:07

## 2017-07-10 RX ADMIN — ACETAZOLAMIDE 500 MG: 500 CAPSULE, EXTENDED RELEASE ORAL at 09:07

## 2017-07-10 NOTE — PROCEDURES
EEG Extended Monitoring greater than one hour  Date/Time: 7/9/2017 7:49 PM  Performed by: SUSANNAH VALENZUELA  Authorized by: VERENA ACEVEDO       EPILEPSY MONITORING UNIT  EEG/VIDEO TELEMETRY REPORT  DATE OF SERVICE: 7/717-7/9/17  EEG NUMBER: -2, 3  REQUESTED BY: Brianna   [place] [facility] [place]      Electroencephalographic (EEG) is recorded with electrodes placed according to the International 10-20 placement system.  Thirty Two (32) channels of digital signal including the T1 and T2 electrodes are simultaneously recorded from the scalp and also including EKG, EMG  and/or eye movement monitors.  Recording band pass was 0.1 to 512 hz.  Digital video recording of the patient is simultaneously recorded with the EEG.  The patient is instructed report clinical symptoms which may occur during the recording session.  EEG and video recording is stored and archived in digital format. Activation procedures which include photic stimulation, hyperventilation and instructing patients to perform simple task are done in selected patients.         The EEG is displayed on a monitor screen and can be reformatted into different montages for evaluation.  The entire recoding is submitted for computer assisted analysis to detect spike and electrographic seizure activity.  The entire recording is visually reviewed and the times identified by computer analysis as being spikes or seizures are reviewed again.  Compresses spectral analysis (CSA) is also performed on the activity recorded from each individual channel.  This is displayed as a power display of frequencies from 0 to 30 Hz over time.   The CSA analysis is done and displayed continuously.  This is reviewed for asymmetries in power between homologous areas of the scalp and for presence of changes in power which canbe seen when seizures occur.  Sections of suspected abnormalities on the CSA is then compared with the original EEG recording.      [employer] software  was also utilized in the review of this study.  This software suite analyzes the EEG recording in multiple domains.  Coherence and rhythmicity is computed to identify EEG sections which may contain organized seizures.  Each channel undergoes analysis to detect presence of spike and sharp waves which have special and morphological characteristic of epileptic activity.  The routine EEG recording is converted from spacial into frequency domain.  This is then displayed comparing homologous areas to identify areas of significant asymmetry.  Algorithm to identify non-cortically generated artifact is used to separate eye movement, EMG and other artifact from the EEG.      Recording Times  Start on 7/7/17 at 07:00:25  Stop on 7/9/17 at 06:59:58  A total of 47 hours and 59 minutes of EEG/Video telemetry was recorded.    ELECTROENCEPHALOGRAM  INTERICTAL: The record shows a good  organization at rest, consisting of a 25-45 uV  10 Hz posterior dominant rhythm with good  reactivity. There is mild bilateral beta activity.    Drowsiness is characterized by attenuation of the background, vertex waves, and bilateral theta slowing. Stage II sleep is characterized by slowing, vertex waves, and symmetric sleep spindles. Slow wave and REM sleep are recorded.    EKG recording shows a sinus rhythm.    There is no push button or clinical event.    ICTAL: No electrographic seizures are recorded.    INTERPRETATION:  ELECTROENCEPHALOGRAM: This is a normal adult EEG captured in the awake, drowsy, and asleep states. Please note that a typical patient event is not captured in this portion of the recording.    Satya Marte MD  Department of Neurology  [facility]

## 2017-07-10 NOTE — ASSESSMENT & PLAN NOTE
Focal seizures with rare secondary generalization vs non-epileptic events     EE/6-: no seizures, no epileptic discharges   - no seizures  -- no seizures  -7/10- no seizures- requesting to be discharged.     - d/c VEEG  - restart Topamax and gabapentin upon d/c  - Increasing topamax to 300 mg BID- gradual increase with plan explained to patient in writing.   F/u with Dr. Venegas in 6 weeks  - avoid medications that lower seizure threshold

## 2017-07-10 NOTE — SUBJECTIVE & OBJECTIVE
Interval History: Patient with no events overnight into this am. Requesting to leave and is adamant about not staying additional day.     Current Facility-Administered Medications   Medication Dose Route Frequency Provider Last Rate Last Dose    acetaminophen tablet 650 mg  650 mg Oral Q4H PRN Nicolle Zhou PA-C   650 mg at 07/10/17 0343    acetaZOLAMIDE 12 hr capsule 500 mg  500 mg Oral BID Nicolle Zhou PA-C   500 mg at 07/10/17 0931    atorvastatin tablet 20 mg  20 mg Oral QHS Nicolle Zhou PA-C   20 mg at 07/09/17 2151    dextrose 50% injection 12.5 g  12.5 g Intravenous PRN Nicolle Zhou PA-C        dextrose 50% injection 25 g  25 g Intravenous PRN Nicolle Zhou PA-C        docusate sodium capsule 100 mg  100 mg Oral Daily PRN Nicolle Zhou PA-C        enoxaparin injection 40 mg  40 mg Subcutaneous Daily Nicolle Zhou PA-C   40 mg at 07/09/17 1711    glucagon (human recombinant) injection 1 mg  1 mg Intramuscular PRN Nicolle Zhou PA-C        glucose chewable tablet 16 g  16 g Oral PRN Nicolle Zhou PA-C        glucose chewable tablet 24 g  24 g Oral PRN Nicolle Zhou PA-C        insulin aspart pen 0-5 Units  0-5 Units Subcutaneous QID (AC + HS) PRN Nicolle Zhou PA-C        losartan tablet 100 mg  100 mg Oral QHS Nicolle Zhou PA-C   100 mg at 07/09/17 2150    montelukast tablet 10 mg  10 mg Oral QHS Nicolle Zhou PA-C   10 mg at 07/08/17 2145    nicotine 21 mg/24 hr 1 patch  1 patch Transdermal Daily Nicolle Zhou PA-C   1 patch at 07/10/17 0932    ondansetron disintegrating tablet 8 mg  8 mg Oral Q8H PRN Nicolle Zhou PA-C        ondansetron injection 4 mg  4 mg Intravenous Q12H PRN Nicolle Zhou PA-C        pantoprazole EC tablet 40 mg  40 mg Oral Daily Nicolle Zhou PA-C   40 mg at 07/10/17 0931    propranolol tablet 20 mg  20 mg Oral QHS Nicolle Zhou PA-C   20 mg at 07/08/17 2145    quetiapine  tablet 25 mg  25 mg Oral QHS Nicolle Zhou PA-C   25 mg at 07/09/17 2151    sodium chloride 0.9% flush 3 mL  3 mL Intravenous Q8H iNcolle Zhou PA-C   3 mL at 07/10/17 0600    venlafaxine 24 hr capsule 75 mg  75 mg Oral QHS Nicolle Zhou PA-C   75 mg at 07/09/17 2151     Continuous Infusions:     Review of Systems   Gastrointestinal: Positive for nausea.   Musculoskeletal: Positive for back pain.   Neurological: Positive for headaches.   All other systems reviewed and are negative.    Objective:     Vital Signs (Most Recent):  Temp: 96.4 °F (35.8 °C) (07/10/17 0826)  Pulse: 69 (07/10/17 0826)  Resp: 18 (07/10/17 0826)  BP: 122/69 (07/10/17 0826)  SpO2: 100 % (07/10/17 0826) Vital Signs (24h Range):  Temp:  [96.4 °F (35.8 °C)-98.2 °F (36.8 °C)] 96.4 °F (35.8 °C)  Pulse:  [63-74] 69  Resp:  [17-20] 18  SpO2:  [95 %-100 %] 100 %  BP: (104-133)/(59-76) 122/69     Weight: (!) 160.6 kg (354 lb)  Body mass index is 60.76 kg/m².    Physical Exam    Constitutional  Morbidly obese, mild distress due to discomfort       Cardiovascular  Radial pulses 2+ and symmetric, no LE edema bilaterally   Neurological    * Mental status      - Orientation  Oriented to person, place, time, and situation     - Memory   Intact recent and remote     - Attention/concentration  Attentive, vigilant during exam     - Language  Naming & repetition intact, +2-step commands     - Fund of knowledge  Aware of current events     - Executive  Well-organized thoughts     - Other                                         * Motor  Muscle bulk normal, strength 5/5 throughout   * Sensory   Intact to temperature and vibration throughout   * Coordination  No dysmetria with finger-to-nose or heel-to-shin   * Gait  not performed   * Deep tendon reflexes  1+ and symmetric throughout               Significant Labs:   Recent Lab Results       07/09/17  2308 07/09/17  1822      POCT Glucose 109 108           Significant Studies: I have reviewed all  pertinent imaging results/findings within the past 24 hours.

## 2017-07-10 NOTE — ASSESSMENT & PLAN NOTE
- Patient not on CPAP at home, does not want to use machine  - Urged patient multiple times during stay that she needs to use CPAP.

## 2017-07-10 NOTE — ASSESSMENT & PLAN NOTE
Focal seizures with rare secondary generalization vs non-epileptic events     EE/6-: no seizures, no epileptic discharges   - no seizures  -- no seizures  -7/10- no seizures- requesting to be discharged.     - d/c VEEG  - restart Topamax and gabapentin upon d/c  - Increasing topamax to 300 mg BID.  F/u with Dr. Venegas in 6 weeks  - avoid medications that lower seizure threshold

## 2017-07-10 NOTE — NURSING
Patient resting comfortably in bed, discharge instructions given by MURTAZA Buckner, patient currently awaiting transportation home. Giovanna Bradford RN

## 2017-07-10 NOTE — DISCHARGE SUMMARY
[facility]  Neurology-Epilepsy  Discharge Summary      Patient Name: Yanni Kaiser  MRN: [id number]  Admission Date: 7/6/2017  Hospital Length of Stay: 4 days  Discharge Date and Time: No discharge date for patient encounter.  Attending Physician: Neal Duncan Jr., MD   Discharging Provider: Herb Fry MD  Primary Care Physician: Carlyle Gordillo MD    HPI:   History of Present Illness:  The patient is a 46 y.o.  RHAAF with a PMH of HTN, HLD, Diabetes, SHARATH, IIH, migraines, Asthma, and Tobacco abuse  The patient was accompanied by her significant other who provided some of the history.      Patient started having evens in 2002. She reports 2 types of events. The first type she describes as whole body stiffening and jerking during sleep. Endorses tounge biting and incontinence with these events. She has not had one of these events in over 2 years. The second type she describes as staring spells. She reports that these occur a couple times a month. She is currently on Topamax 200 mg BID and Gabapentin 600 mg BID.     Seizure Triggers  Sleep Deprivation - None  Other medications - None  Psych/stress - None  Photic stimulation - None  Hyperventilation - None  Medical Problems - None  Menses - No  Sensory Stimulation (light, sound, etc) - None  Missed dose of meds - None    AED Treatments  Present regimen  Topamax  Gabapentin    Prior treatments  Lamictal     Not tried  acetazolamide (Diamox, AZM)  amantadine  carbamazepine (Tegretol, CBZ)  clobezam (Onfi or Frizium, CLB)  ethosuximide (Zarontin, ESM)  eslicarbazine (Aptiom, ESL)  felbamate (felbatol, FBM)  lacosamide (Vimpat, LCS)   levetiracetam (Keppra, LEV)  methsuximide (Celontin, MSM)  methyphenytoin (Mesantion, MHT)  oxcarbazepine (Trileptal OXC)  perampanel (Fycompa, FCP)   phenobarbital (Pb)  phenytoin (Dilantin, PHT)  pregabalin (Lyrica, PGB)  primidone (Mysoline, PRM)  retigabine (Potiga, RTG)  rufinamide (Banzel,  RUF)  tiagabine (Gabatril,  TGB)  viagabatrin, (Sabril, VGB)  vagal nerve stimulator (VNS)  valproic acid (Depakote, VPA)  zonisamide (Zonegran, ZNA)  Benzodiazepines  diazepam - rectal (Diastatl)  diazepam - oral (Valium, DZ)  clonazepam (Klonopin, CZP)  clorazepate (Tranxene, CLZ)  Ativan  Brain Stimulation  Vagal Nerve Stimulation-n/a  DBS- n/a    Compliance method  Memory - yes  Mom or Spouse - Yes  Pill Box - no  Matthew calendar - no  Turn over medication bottle - no  Phone alarm - no    Seizure Evaluation  EEG Routine -   7/15/15  Interpretation: This is a normal EEG in an awake, drowsy, and asleep adult.  Please be aware that a normal EEG does not exclude the possibility of an underlying seizure disorder.  EEG Ambulatory -   EEG\Video Monitoring -   MRI/MRA -   3/21/16  Unremarkable motion limited MRI brain specifically without evidence for acute infarction or enhancing lesion.  CT/CTA Scan -   PET Scan -   Neuropsychological evaluation -   DEXA Scan    Potential Epilepsy Risk Factors:   Pregnancy/Labor/Delivery - full term uncomplicated pregnancy labor and vaginal delivery  Febrile seizures - none  Head injury  - none  CNS infection - none     Stroke - none  Family Hx of Sz - none      * No surgery found *     Indwelling Lines/Drains at time of discharge:   Lines/Drains/Airways          No matching active lines, drains, or airways        Hospital Course:   Patient admitted to EMU for event characterization. Topamax and gabapentin held on admit. NO events thus far. No seizures or epileptic discharges on EEG.  7/7-7/8- No events- sleep deprived  7/8-7/9- no events  7/9-7/10- no events- patient requesting to leave EMU. We discussed that it is important to capture events. Patient stated she is too uncomfortable and does not want to stay. She is adamant about leaving.     Consults:     Significant Labs: All pertinent lab results from the past 24 hours have been reviewed.    Significant Studies: I have reviewed all  pertinent imaging results/findings within the past 24 hours.    Pending Diagnostic Studies:     None        Final Active Diagnoses:    Diagnosis Date Noted POA    PRINCIPAL PROBLEM:  Localz-rltd symptomatic epilepsy w cmplx part sz, intract, wo status [G40.219] 07/10/2017 Yes    Complex partial epilepsy, intractable [G40.219] 2017 Yes    Type 2 diabetes mellitus with stage 3 chronic kidney disease, without long-term current use of insulin [E11.22, N18.3] 2017 Yes    Hyperlipidemia [E78.5] 2016 Yes     Chronic    GERD (gastroesophageal reflux disease) [K21.9] 2016 Yes     Chronic    Depression [F32.9] 2016 Yes    Essential hypertension [I10] 2016 Yes     Chronic    Migraine [G43.909] 2014 Yes     Chronic    SHARATH (obstructive sleep apnea) [G47.33] 2014 Yes      Problems Resolved During this Admission:    Diagnosis Date Noted Date Resolved POA       GERD (gastroesophageal reflux disease)    - Continue home Protonix 40 mg daily        Hyperlipidemia    - Continue home Lipitor 20 mg QHS        Depression    - restart home seroquel upon discharge        Essential hypertension    - Continue home propranolol 20 mg QHS  - Continue home losartan 100 mg QHS        Migraine    - Restart topamax upon discharge  - Continue Effexor 75 mg QHS  - Continue Diamox 500 mg BID        SHARATH (obstructive sleep apnea)    - Patient not on CPAP at home, does not want to use machine  - Urged patient multiple times during stay that she needs to use CPAP.         * Localz-rltd symptomatic epilepsy w cmplx part sz, intract, wo status    Focal seizures with rare secondary generalization vs non-epileptic events     EE/6-: no seizures, no epileptic discharges   -- no seizures  -- no seizures  -7/10- no seizures- requesting to be discharged.     - d/c VEEG  - restart Topamax and gabapentin upon d/c  - Increasing topamax to 300 mg BID.  F/u with Dr. Venegas in 6 weeks  -  avoid medications that lower seizure threshold              Discharged Condition: fair    Disposition: Home or Self Care    Follow Up:  Follow-up Information     Riccardo Moore MD In 6 weeks.    Specialty:  Neurology  Contact information:  [address]  [facility] [address] [phone]                 Patient Instructions:     Diet general   Order Specific Question Answer Comments   Additional restrictions: Cardiac (Low Na/Chol)      Activity as tolerated     Call MD for:  persistent nausea and vomiting or diarrhea     Call MD for:  severe uncontrolled pain     Call MD for:  difficulty breathing or increased cough     Call MD for:  persistent dizziness, light-headedness, or visual disturbances     Call MD for:  increased confusion or weakness         Medications:  Reconciled Home Medications:   Current Discharge Medication List      CONTINUE these medications which have CHANGED    Details   topiramate (TOPAMAX) 100 MG tablet Take 3 tablets (300 mg total) by mouth 2 (two) times daily. Please start taking 2 tablet (200 mg) twice a day for the next week. Then increase to additional tablet in evenings (3 tablets at night and 2 in morning) for 1 week. Then increase to 3 tablets in morning and 3 tablets in evening.  Qty: 90 tablet, Refills: 11    Associated Diagnoses: IIH (idiopathic intracranial hypertension); Chronic nonintractable headache, unspecified headache type         CONTINUE these medications which have NOT CHANGED    Details   acetaZOLAMIDE (DIAMOX) 500 mg CpSR Take 500 mg by mouth 2 (two) times daily.      atorvastatin (LIPITOR) 20 MG tablet Take 1 tablet (20 mg total) by mouth once daily.  Qty: 90 tablet, Refills: 1    Comments: Discontinue 30 day supply  Associated Diagnoses: Combined hyperlipidemia associated with type 2 diabetes mellitus      gabapentin (NEURONTIN) 300 MG capsule Take 2 capsules (600 mg total) by mouth every evening.  Qty: 180 capsule, Refills: 11    Associated Diagnoses:  Convulsions, unspecified convulsion type      hydrOXYzine pamoate (VISTARIL) 50 MG Cap Take 1 capsule (50 mg total) by mouth every 8 (eight) hours as needed (itching).  Qty: 30 capsule, Refills: 1    Associated Diagnoses: Itching      LINZESS 290 mcg Cap TK ONE CAPSULE PO D ON AN EMPTY STOMACH  Refills: 5      losartan (COZAAR) 100 MG tablet Take 1 tablet (100 mg total) by mouth once daily.  Qty: 90 tablet, Refills: 3      metformin (GLUCOPHAGE-XR) 500 MG 24 hr tablet Take 2 tablets (1,000 mg total) by mouth 2 (two) times daily with meals.  Qty: 360 tablet, Refills: 0      montelukast (SINGULAIR) 10 mg tablet Refills: 1      pantoprazole (PROTONIX) 40 MG tablet once daily.   Refills: 0      propranolol (INDERAL) 20 MG tablet Take 1 tablet (20 mg total) by mouth 2 (two) times daily.  Qty: 60 tablet, Refills: 0    Associated Diagnoses: Tremor of both hands      quetiapine (SEROQUEL) 25 MG Tab Take 1 tablet (25 mg total) by mouth every evening.  Qty: 30 tablet, Refills: 0    Associated Diagnoses: Severe episode of recurrent major depressive disorder, with psychotic features      venlafaxine (EFFEXOR-XR) 75 MG 24 hr capsule Take 75 mg by mouth once daily.      ACCU-CHEK ELVIA PLUS METER Misc TEST FOUR TIMES DAILY BEFORE MEALS AND EVERY NIGHT  Qty: 90 each, Refills: 1      albuterol (ACCUNEB) 1.25 mg/3 mL Nebu Take 3 mLs (1.25 mg total) by nebulization every 6 (six) hours as needed. Rescue  Qty: 100 mL, Refills: 0    Associated Diagnoses: Asthma exacerbation      albuterol 90 mcg/actuation inhaler Inhale 2 puffs into the lungs every 6 (six) hours as needed for Wheezing. Rescue  Qty: 18 g, Refills: 0    Associated Diagnoses: Mild intermittent asthma without complication      albuterol-ipratropium 2.5mg-0.5mg/3mL (DUO-NEB) 0.5 mg-3 mg(2.5 mg base)/3 mL nebulizer solution Take 3 mLs by nebulization every 6 (six) hours as needed for Wheezing or Shortness of Breath. Rescue  Qty: 100 mL, Refills: 0    Associated Diagnoses:  Asthma exacerbation      blood sugar diagnostic Strp 1 each by Misc.(Non-Drug; Combo Route) route 4 (four) times daily before meals and nightly. ACCU CHEK ELVIA PLUS METER  Qty: 200 each, Refills: 3      capsaicin 0.1 % Crea Apply 1 application topically 2 (two) times daily.  Qty: 56.6 g, Refills: 1    Associated Diagnoses: Foot pain, bilateral      EPIPEN 2-RHODA 0.3 mg/0.3 mL (1:1,000) AtIn Refills: 1      fluticasone (FLONASE) 50 mcg/actuation nasal spray 1 spray by Each Nare route daily as needed.  Qty: 16 g, Refills: 1    Associated Diagnoses: Nasal congestion      fluticasone-vilanterol (BREO ELLIPTA) 200-25 mcg/dose DsDv diskus inhaler Inhale 1 puff into the lungs once daily.  Qty: 1 each, Refills: 3    Associated Diagnoses: Moderate intermittent asthma, with acute exacerbation      furosemide (LASIX) 20 MG tablet Take 1 tablet (20 mg total) by mouth once daily.  Qty: 5 tablet, Refills: 0    Associated Diagnoses: Localized edema      hydrocortisone 2.5 % cream Apply topically 2 (two) times daily.  Qty: 20 g, Refills: 0    Associated Diagnoses: Bee sting, accidental or unintentional, initial encounter      ibuprofen (ADVIL,MOTRIN) 600 MG tablet Take 1 tablet (600 mg total) by mouth every 8 (eight) hours as needed for Pain.  Qty: 15 tablet, Refills: 0      !! lancets (ACCU-CHEK SOFTCLIX LANCETS) Misc 1 Device by Misc.(Non-Drug; Combo Route) route 4 (four) times daily.  Qty: 200 each, Refills: 3      !! lancets 25 gauge Misc 1 lancet by Misc.(Non-Drug; Combo Route) route 4 (four) times daily before meals and nightly.  Qty: 200 each, Refills: 11      nystatin-triamcinolone (MYCOLOG) ointment Apply topically 2 (two) times daily.  Qty: 60 g, Refills: 2    Associated Diagnoses: Intertrigo      TAZTIA  mg CpSR TK ONE CAPSULE PO D  Refills: 2       !! - Potential duplicate medications found. Please discuss with provider.        Time spent on the discharge of patient: 45 minutes    Herb Fry,  MD  Neurology-Epilepsy  [facility]

## 2017-07-11 NOTE — PROCEDURES
Date of service  7/9/2017-7/10/2017    EEG #:  EMU -4  EMU -5    Introduction  Electroencephalographic (EEG) is recorded with electrodes placed according to the International 10-20 placement system.  Thirty two (32) channels  of digital signal (sampling rate of 512/sec), including T1 and T2, were simultaneously recorded from the scalp and may include EKG, EMG, and/or eye monitors.  Recording band pass was 0.1 to 512 Hz.  Digital video recording of the patient is simultaneously recorded with the EEG.  The patient is instructed to report clinical symptoms which may occur during the recording session.  EEG and video recording are stored and archived in digital format.  Activation procedures, which include photic stimulation, hyperventilation and instructing patients to perform simple tasks, are done in selected patients.    The EEG is displayed on a monitor screen and can be reviewed using different montages.  Computer-assisted analysis is employed to detect spike and electrographic seizure activity.   The entire record is submitted for computer analysis.  The entire recording is visually reviewed, and the times identified by computer analysis as being spikes or seizures are reviewed again.      Compressed spectral analysis (CSA) is also performed on the activity recorded from each individual channel.  This is displayed as a power display of frequencies from 0 to 30 Hz over time.   The CSA is reviewed looking for asymmetries in power between homologous areas of the scalp, then compared with the original EEG recording.      [employer] software was also utilized in the review of this study.  This software suite analyzes the EEG recording in multiple domains.  Coherence and rhythmicity is computed to identify EEG sections which may contain organized seizures.  Each channel undergoes analysis to detect presence of spike and sharp waves which have special and morphological characteristic of epileptic activity.  The  routine EEG recording is converted from spacial into frequency domain.  This is then displayed comparing homologous areas to identify areas of significant asymmetry.  Algorithm to identify non-cortically generated artifact is used to separate eye movement, EMG and other artifact from the EEG.      Recording Times  Start on 7/9/17 at 07:00:22  Stop on 7/10/17 at 07:00:02  Start on 7/10/17 at 07:00:25  Stop on 7/10/17 at 10:55:45  A total of 27 hours, 4 minutes, and 43 seconds of EEG/video telemetry was recorded.  This portion of the study represents the final portion of an ongoing evaluation.    Findings  The patient's background consists of a posteriorly dominant 10 Hz alpha rhythm, which is well formed, well modulated, and abolishes with eye opening.  Anteriorly, the patient's background consists of predominantly beta range frequencies.  There is no focal slowing.  There are no focal, lateralized, or epileptiform transients.  No electrographic seizures are seen.    During the course of the recording, the patient is noted to be awake, and subsequently becomes drowsy.  Ultimately, stage N3 sleep is appreciated.    Hyperventilation and photic stimulation did not result in pathologic changes to the EEG.    EKG demonstrates sinus rhythm.    The patient had no clinical events during this portion of the recording.    Interpretation  This is a normal LTM-EEG study demonstrating the awake, drowsy, and asleep states.  This recording provides no evidence of epilepsy.  Please be aware that a normal video EEG monitoring study that does not capture the events of interest cannot fully exclude the possibility of an underlying seizure disorder.    Comprehensive Summary  As noted above, this study represents a continuation of an ongoing recording.  Review of the reports related to prior portions of this evaluation indicate that the patient had a normal EEG with no clinical events.  Taken together, this is a normal LTM-EEG study  demonstrating the awake, drowsy, and asleep states.  This recording provides no evidence of epilepsy.  Please be aware that a normal video EEG monitoring study that does not capture the events of interest cannot definitively the possibility of an underlying seizure disorder.

## 2017-07-12 ENCOUNTER — PATIENT OUTREACH (OUTPATIENT)
Dept: ADMINISTRATIVE | Facility: CLINIC | Age: 47
End: 2017-07-12

## 2017-07-12 NOTE — PATIENT INSTRUCTIONS
Discharge Instructions for Epilepsy  You have been diagnosed with epilepsy, a disorder of recurring seizures. When you have a seizure, an electrical disturbance happens in your brain. There are different kinds of seizures, and each patient may have one or many types of seizures. Here are some guidelines for you and your family.  If you have a seizure  Ask friends and family members to learn seizure management. Also, tell them to do the following if you have a seizure:  · Clear the area to prevent injury.  · Position you on a flat, carpeted surface, if possible.  · Dont try to restrain you.  · Dont put anything in your mouth.  · Turn you onto your side if you start to vomit.  · Keep track of the date and time the seizure started, how long it lasted, whether or not you lost consciousness, a description of your body movements, what provoked the seizure (if known), and any injuries you suffered. Using a watch may help keep correct time of events.    · Stay with you until you regain consciousness.  · Call 911 if the seizure is longer than 5 minutes, if there are multiple seizures, or if you do not begin to wake up after the seizure stops.    Activities  Following are some things to consider:  · Enjoy your normal activities. Most people with epilepsy lead normal lives.  · Avoid hazardous activities, such as mountain climbing or scuba diving. A seizure under these conditions could lead to a fatal accident.  · Do not swim alone or participate in other similar activities without others nearby.  · Ask your healthcare provider about any restrictions on driving or other activities.  · Check with your state department of public safety to learn whether there are any driving limitations based on your condition.  Other home care  Other considerations:  · Take your medicine exactly as directed. Skipping doses can affect the way your body handles the medicine, which could cause you to have a seizure.  · Dont drink alcohol or use  any medicine without talking with your healthcare provider first.  · Seizure medicines may interact with other medicines. Make sure all of your healthcare providers have a list of all your medicines.   · Birth control pills may not work as effectively when taking seizure medicines. Ask your healthcare provider if a change in birth control is needed.   · Wear a medical alert pendant or bracelet that alerts others to your condition, especially if you are allergic to seizure medicine.  · Join a local support group. Ask your healthcare provider for names and phone numbers.  .  When to seek medical attention  Tell your family members or friends to call 911 right away if you have:  · Seizure that lasts more than 5 minutes  · Multiple seizures in a row  · No recovery of consciousness after the seizure stops  Otherwise, have them call your healthcare provider right away if you have:  · Seizures that are getting longer and worse  · Seizures that are different from those youve had in the past  · Seizures strong enough to cause injury  · Skin rash  · Fever of 100.4°F (38°C) or higher, or as directed by your healthcare provider   Date Last Reviewed: 11/5/2015  © 1508-7825 [employer]. [address] [place] [address], [place] [address]. All rights reserved. This information is not intended as a substitute for professional medical care. Always follow your healthcare professional's instructions.

## 2017-07-13 ENCOUNTER — OUTPATIENT CASE MANAGEMENT (OUTPATIENT)
Dept: ADMINISTRATIVE | Facility: OTHER | Age: 47
End: 2017-07-13

## 2017-07-13 NOTE — PROGRESS NOTES
Follow up with pt. Pt was admitted to EMU. Pt did not stay to complete testing. Pt denies seizures since she has been home and is taking medications as prescribed. Pt is scheduled for HRA on Monday. She states she will be there.    Interventions performed:      Encourage compliance with treatment plan  Discuss blood glucose monitoring   Educate on hyperglycemia and hypoglycemia    Plan:   Follow up on understanding of education provided.  Follow up appts

## 2017-07-16 NOTE — PROGRESS NOTES
"Yanni Kaiser presented for a  Medicare AWV and comprehensive Health Risk Assessment today. The following components were reviewed and updated:    · Medical history  · Family History  · Social history  · Allergies and Current Medications  · Health Risk Assessment  · Health Maintenance  · Care Team     ** See Completed Assessments for Annual Wellness Visit within the encounter summary.**       The following assessments were completed:  · Living Situation  · CAGE  · Depression Screening  · Timed Get Up and Go  · Whisper Test  · Cognitive Function Screening  · Nutrition Screening  · ADL Screening  · PAQ Screening    Vitals:    07/17/17 1001   BP: 134/82   BP Location: Right arm   Patient Position: Sitting   BP Method: Manual   Pulse: 83   Temp: 98.6 °F (37 °C)   TempSrc: Oral   SpO2: 97%   Weight: (!) 161.6 kg (356 lb 4.2 oz)   Height: 5' 4" (1.626 m)     Body mass index is 61.15 kg/m².  Physical Exam   Constitutional: She is oriented to person, place, and time. She appears well-developed and well-nourished.   HENT:   Head: Normocephalic and atraumatic.   Cardiovascular: Normal rate, regular rhythm and normal heart sounds.    Pulmonary/Chest: Effort normal and breath sounds normal. No respiratory distress. She has no decreased breath sounds.   Neurological: She is alert and oriented to person, place, and time.   Skin: She is not diaphoretic. No pallor.   Psychiatric: She has a normal mood and affect. Her speech is normal and behavior is normal.         Diagnoses and health risks identified today and associated recommendations/orders:    1. Encounter for preventive health examination    2. Partial symptomatic epilepsy with complex partial seizures, intractable, without status epilepticus    3. Essential hypertension    4. Hyperlipidemia, unspecified hyperlipidemia type    5. Controlled type 2 diabetes mellitus with complication, without long-term current use of insulin    6. Type 2 diabetes mellitus with stage 3 " chronic kidney disease, without long-term current use of insulin    7. Seizure disorder    8. Morbid obesity with BMI of 60.0-69.9, adult    9. Localz-rltd symptomatic epilepsy w cmplx part sz, intract, wo status    10. Gastroesophageal reflux disease without esophagitis      Problem List Items Addressed This Visit     Type 2 diabetes mellitus, controlled (Chronic)    Current Assessment & Plan     Stable and controlled. Continue current treatment plan as previously prescribed with your PCP.   The patient is asked to make an attempt to improve diet and exercise patterns to aid in medical management of this problem.  Followed by PCP           Type 2 diabetes mellitus with stage 3 chronic kidney disease, without long-term current use of insulin    Current Assessment & Plan     Stable and controlled. Continue current treatment plan as previously prescribed with your PCP.   The patient is asked to make an attempt to improve diet and exercise patterns to aid in medical management of this problem.  Followed by PCP           Seizure disorder (Chronic)    Current Assessment & Plan     Stable and controlled. Continue current treatment plan as previously prescribed with your PCP.   Taking medications as prescribed           Morbid obesity with BMI of 60.0-69.9, adult (Chronic)    Current Assessment & Plan     The patient is asked to make an attempt to improve diet and exercise patterns to aid in medical management of this problem.           Localz-rltd symptomatic epilepsy w cmplx part sz, intract, wo status    Current Assessment & Plan     Stable and controlled. Continue current treatment plan as previously prescribed with your PCP.   Continue current medication regimen             Hyperlipidemia (Chronic)    Current Assessment & Plan     Stable and controlled. Continue current treatment plan as previously prescribed with your PCP.   The patient is asked to make an attempt to improve diet and exercise patterns to aid in  medical management of this problem.             GERD (gastroesophageal reflux disease) (Chronic)    Current Assessment & Plan     Stable and controlled. Continue current treatment plan as previously prescribed with your PCP.              Essential hypertension (Chronic)    Current Assessment & Plan     Stable and controlled. Continue current treatment plan as previously prescribed with your PCP.   The patient is asked to make an attempt to improve diet and exercise patterns to aid in medical management of this problem.             Complex partial epilepsy, intractable    Current Assessment & Plan     Stable and controlled. Continue current treatment plan as previously prescribed with your PCP.   Taking medications as prescribed             Other Visit Diagnoses     Encounter for preventive health examination    -  Primary          Provided Vertrice with a 5-10 year written screening schedule and personal prevention plan. Recommendations were developed using the USPSTF age appropriate recommendations. Education, counseling, and referrals were provided as needed. After Visit Summary printed and given to patient which includes a list of additional screenings\tests needed.    Return in about 1 year (around 7/17/2018).    KAMILA Stewart-C

## 2017-07-17 ENCOUNTER — TELEPHONE (OUTPATIENT)
Dept: NEUROLOGY | Facility: CLINIC | Age: 47
End: 2017-07-17

## 2017-07-17 ENCOUNTER — OUTPATIENT CASE MANAGEMENT (OUTPATIENT)
Dept: ADMINISTRATIVE | Facility: OTHER | Age: 47
End: 2017-07-17

## 2017-07-17 ENCOUNTER — OFFICE VISIT (OUTPATIENT)
Dept: FAMILY MEDICINE | Facility: CLINIC | Age: 47
End: 2017-07-17
Payer: MEDICARE

## 2017-07-17 VITALS
TEMPERATURE: 99 F | HEART RATE: 83 BPM | SYSTOLIC BLOOD PRESSURE: 134 MMHG | WEIGHT: 293 LBS | HEIGHT: 64 IN | BODY MASS INDEX: 50.02 KG/M2 | DIASTOLIC BLOOD PRESSURE: 82 MMHG | OXYGEN SATURATION: 97 %

## 2017-07-17 DIAGNOSIS — E66.01 MORBID OBESITY WITH BMI OF 60.0-69.9, ADULT: Chronic | ICD-10-CM

## 2017-07-17 DIAGNOSIS — N18.30 TYPE 2 DIABETES MELLITUS WITH STAGE 3 CHRONIC KIDNEY DISEASE, WITHOUT LONG-TERM CURRENT USE OF INSULIN: ICD-10-CM

## 2017-07-17 DIAGNOSIS — E11.8 CONTROLLED TYPE 2 DIABETES MELLITUS WITH COMPLICATION, WITHOUT LONG-TERM CURRENT USE OF INSULIN: Chronic | ICD-10-CM

## 2017-07-17 DIAGNOSIS — I10 ESSENTIAL HYPERTENSION: Chronic | ICD-10-CM

## 2017-07-17 DIAGNOSIS — G40.909 SEIZURE DISORDER: Chronic | ICD-10-CM

## 2017-07-17 DIAGNOSIS — E78.5 HYPERLIPIDEMIA, UNSPECIFIED HYPERLIPIDEMIA TYPE: Chronic | ICD-10-CM

## 2017-07-17 DIAGNOSIS — E11.22 TYPE 2 DIABETES MELLITUS WITH STAGE 3 CHRONIC KIDNEY DISEASE, WITHOUT LONG-TERM CURRENT USE OF INSULIN: ICD-10-CM

## 2017-07-17 DIAGNOSIS — Z00.00 ENCOUNTER FOR PREVENTIVE HEALTH EXAMINATION: Primary | ICD-10-CM

## 2017-07-17 DIAGNOSIS — G40.219 LOCALZ-RLTD SYMPTOMATIC EPILEPSY W CMPLX PART SZ, INTRACT, WO STATUS: ICD-10-CM

## 2017-07-17 DIAGNOSIS — G40.219 PARTIAL SYMPTOMATIC EPILEPSY WITH COMPLEX PARTIAL SEIZURES, INTRACTABLE, WITHOUT STATUS EPILEPTICUS: ICD-10-CM

## 2017-07-17 DIAGNOSIS — K21.9 GASTROESOPHAGEAL REFLUX DISEASE WITHOUT ESOPHAGITIS: Chronic | ICD-10-CM

## 2017-07-17 PROCEDURE — G0439 PPPS, SUBSEQ VISIT: HCPCS | Mod: S$GLB,,, | Performed by: NURSE PRACTITIONER

## 2017-07-17 PROCEDURE — 99999 PR PBB SHADOW E&M-EST. PATIENT-LVL V: CPT | Mod: PBBFAC,,, | Performed by: NURSE PRACTITIONER

## 2017-07-17 RX ORDER — INSULIN ASPART 100 [IU]/ML
INJECTION, SOLUTION INTRAVENOUS; SUBCUTANEOUS
Refills: 11 | Status: ON HOLD | COMMUNITY
Start: 2017-07-05 | End: 2018-09-04

## 2017-07-17 NOTE — PROGRESS NOTES
"Met with pt at A appt at clinic. Pt drowsy during visit. Pt states she is drowsy all day when she takes Seroquel. Pt had appt with Rockweiler, LCSW. Pt has not scheduled appt with psych. Pt states she has no energy. Pt feels she has no motivation to take care of herself. She is noncompliant with treatment plan. She is taking Topamax for seizures. She states "I don't think I've had any seizures recently".     Interventions performed:   Encourage pt to schedule appt with psych  Encourage follow up with counseling  Encourage compliance with medications and appts  Collaborate with GIGI    Plan:     Follow up on psych appt  Continue education on diabetes.   "

## 2017-07-17 NOTE — TELEPHONE ENCOUNTER
----- Message from Satya Ravi sent at 7/14/2017  9:56 AM CDT -----  Contact: Cassie with Pharmacy [phone]  Caller is calling to get clarity on the direction ( topiramate (TOPAMAX) 100 MG tablet )     [facility] [address] [place], [place] [address] AT [address]  [place] [address]  Phone: [phone] Fax: [phone]

## 2017-07-17 NOTE — ASSESSMENT & PLAN NOTE
Stable and controlled. Continue current treatment plan as previously prescribed with your PCP.   Taking medications as prescribed

## 2017-07-17 NOTE — PATIENT INSTRUCTIONS
Counseling and Referral of Other Preventative  (Italic type indicates deductible and co-insurance are waived)    Patient Name: Yanni Kaiser  Today's Date: 7/17/2017      SERVICE LIMITATIONS RECOMMENDATION    Vaccines    · Pneumococcal (once after 65)    · Influenza (annually)    · Hepatitis B (if medium/high risk)    · Prevnar 13      Hepatitis B medium/high risk factors:       - End-stage renal disease       - Hemophiliacs who received Factor VII or         IX concentrates       - Clients of institutions for the mentally             retarded       - Persons who live in the same house as          a HepB carrier       - Homosexual men       - Illicit injectable drug abusers     Pneumococcal: N/A     Influenza: Done, repeat in one year     Hepatitis B: N/A     Prevnar 13: Done 06/13/2015    Mammogram (biennial age 50-74)  Annually (age 40 or over)  Last done 04/26/2017, recommend to repeat every 1  years    Pap (up to age 70 and after 70 if unknown history or abnormal study last 10 years)    Last pap smear was 08/23/2016.      The USPSTF recommends against screening for cervical cancer in women older than age 65 years who have had adequate prior screening and are not otherwise at high risk for cervical cancer.      Colorectal cancer screening (to age 75)    · Fecal occult blood test (annual)  · Flexible sigmoidoscopy (5y)  · Screening colonoscopy (10y)  · Barium enema   N/A    Diabetes self-management training (no USPSTF recommendations)  Requires referral by treating physician for patient with diabetes or renal disease. 10 hours of initial DSMT sessions of no less than 30 minutes each in a continuous 12-month period. 2 hours of follow-up DSMT in subsequent years.  N/A    Bone mass measurements (age 65 & older, biennial)  Requires diagnosis related to osteoporosis or estrogen deficiency. Biennial benefit unless patient has history of long-term glucocorticoid  N/A    Glaucoma screening (no USPSTF recommendation)   Diabetes mellitus, family history   , age 50 or over    American, age 65 or over  Annual eye exams    Medical nutrition therapy for diabetes or renal disease (no recommended schedule)  Requires referral by treating physician for patient with diabetes or renal disease or kidney transplant within the past 3 years.  Can be provided in same year as diabetes self-management training (DSMT), and CMS recommends medical nutrition therapy take place after DSMT. Up to 3 hours for initial year and 2 hours in subsequent years.  N/A    Cardiovascular screening blood tests (every 5 years)  · Fasting lipid panel  Order as a panel if possible  Last done 07/30/2016, recommend to repeat every 1  years    Diabetes screening tests (at least every 3 years, Medicare covers annually or at 6-month intervals for prediabetic patients)  · Fasting blood sugar (FBS) or glucose tolerance test (GTT)  Patient must be diagnosed with one of the following:       - Hypertension       - Dyslipidemia       - Obesity (BMI 30kg/m2)       - Previous elevated impaired FBS or GTT       ... or any two of the following:       - Overweight (BMI 25 but <30)       - Family history of diabetes       - Age 65 or older       - History of gestational diabetes or birth of baby weighing more than 9 pounds  Last done 07/06/2017, recommend to repeat every 1  years    Abdominal aortic aneurysm screening (once)  · Sonogram   Limited to patients who meet one of the following criteria:       - Men who are 65-75 years old and have smoked more than 100 cigarette in their lifetime       - Anyone with a family history of abdominal aortic aneurysm       - Anyone recommended for screening by the USPSTF  N/A    HIV screening (annually for increased risk patients)  · HIV-1 and HIV-2 by EIA, or MP, rapid antibody test or oral mucosa transudate  Patients must be at increased risk for HIV infection per USPSTF guidelines or pregnant. Tests covered annually for  patient at increased risk or as requested by the patient. Pregnant patients may receive up to 3 tests during pregnancy.  Risks discussed, screening is not recommended    Smoking cessation counseling (up to 8 sessions per year)  Patients must be asymptomatic of tobacco-related conditions to receive as a preventative service.  Recommended    Subsequent annual wellness visit  At least 12 months since last AWV  Return in one year     The following information is provided to all patients.  This information is to help you find resources for any of the problems found today that may be affecting your health:                Living healthy guide: www.[facility].[place]      Understanding Diabetes: www.diabetes.org      Eating healthy: www.cdc.gov/healthyweight      [facility] home safety checklist: www.cdc.gov/steadi/patient.html      Agency on Aging: www.goea.[place]      Alcoholics anonymous (AA): www.aa.org      Physical Activity: www.janis.nih.gov/qk8oswu      Tobacco use: www.quitwithusla.org

## 2017-07-17 NOTE — ASSESSMENT & PLAN NOTE
Stable and controlled. Continue current treatment plan as previously prescribed with your PCP.   The patient is asked to make an attempt to improve diet and exercise patterns to aid in medical management of this problem.  Followed by PCP

## 2017-07-17 NOTE — ASSESSMENT & PLAN NOTE
Stable and controlled. Continue current treatment plan as previously prescribed with your PCP.   Continue current medication regimen

## 2017-07-18 ENCOUNTER — OUTPATIENT CASE MANAGEMENT (OUTPATIENT)
Dept: ADMINISTRATIVE | Facility: OTHER | Age: 47
End: 2017-07-18

## 2017-07-18 NOTE — PROGRESS NOTES
"Chart reviewed.  Telephonic follow-up with patient.  Patient reports that she is still "somewhat" depressed but denies S/I/H/I.  Patient visited LCSW Rockweiller on 7/5/17 for psychotherapy, but has not scheduled the follow-up visit.  Patient will call to schedule follow-up appointment with Ms. Rockweiller within 72 hours.  Patient will discuss whether or not she should schedule an appointment with Dr. De Paz, [facility], at her next appointment with Ms. Rockweiller. Patient reports that she is compliant with medication.  Education provided on depression and anxiety.  Examples of ways to reduce s/s of depression and anxiety discussed.  Patient will devise a plan to engage in more activities/socialization prior to next follow-up call.  Patient reminded to utilize the [facility] Crisis Intervention Hotline as needed.  Patient advised to call 911 and go to the hospital if she has S/I/H/I.      Follow-up to:  Ensure that mental health appointments are scheduled and attended.  Review patient's plan to engage in more activities/socialization to reduce s/s depression/anxiety.  Provide ongoing support as needed.    "

## 2017-07-24 ENCOUNTER — OFFICE VISIT (OUTPATIENT)
Dept: FAMILY MEDICINE | Facility: CLINIC | Age: 47
End: 2017-07-24
Payer: MEDICARE

## 2017-07-24 VITALS
DIASTOLIC BLOOD PRESSURE: 80 MMHG | BODY MASS INDEX: 50.02 KG/M2 | WEIGHT: 293 LBS | RESPIRATION RATE: 14 BRPM | HEIGHT: 64 IN | SYSTOLIC BLOOD PRESSURE: 134 MMHG | HEART RATE: 80 BPM | TEMPERATURE: 98 F | OXYGEN SATURATION: 98 %

## 2017-07-24 DIAGNOSIS — M72.2 BILATERAL PLANTAR FASCIITIS: Primary | ICD-10-CM

## 2017-07-24 PROCEDURE — 99999 PR PBB SHADOW E&M-EST. PATIENT-LVL III: CPT | Mod: PBBFAC,,, | Performed by: FAMILY MEDICINE

## 2017-07-24 PROCEDURE — 99214 OFFICE O/P EST MOD 30 MIN: CPT | Mod: S$GLB,,, | Performed by: FAMILY MEDICINE

## 2017-07-24 RX ORDER — HYDROCODONE BITARTRATE AND IBUPROFEN 7.5; 2 MG/1; MG/1
1 TABLET, FILM COATED ORAL EVERY 8 HOURS PRN
Qty: 30 TABLET | Refills: 0 | Status: SHIPPED | OUTPATIENT
Start: 2017-07-24 | End: 2017-07-27 | Stop reason: SINTOL

## 2017-07-25 ENCOUNTER — OUTPATIENT CASE MANAGEMENT (OUTPATIENT)
Dept: ADMINISTRATIVE | Facility: OTHER | Age: 47
End: 2017-07-25

## 2017-07-25 NOTE — PROGRESS NOTES
Chart reviewed.  Attempted to contact patient for follow-up but patient's phone was not working properly (a ring and then a fast busy signal).  LCSW will attempt to contact at a later date.    Follow-up to:  Ensure that mental health appointments are scheduled and attended.  Review patient's plan to engage in more activities/socialization to reduce s/s depression/anxiety.  Provide ongoing support as needed.

## 2017-07-25 NOTE — PROGRESS NOTES
Routine Office Visit    Patient Name: Yanni Kaiser    : 1970  MRN: [id number]    Subjective:  Yanni is a 46 y.o. female who presents today for:    1. Bilateral foot pain  Patient presenting today with bilateral foot pain.  She is seen by podiatry and states that she is suppose to have surgery in the next few months to fix her plantar fasciitis.  She had an MRI of her right foot that showed multiple problems with the foot including torn fibers and plantar fasciitis.  She states that the pain is causing her to walk differently and that is hurting her back.  There has been no sudden weakness or incontinence.  She states that ibuprofen has not helped with with the pain and she has been out of controlled substances for the pain.      Past Medical History  Past Medical History:   Diagnosis Date    Allergy     Asthma     Diabetes mellitus, type 2     GERD (gastroesophageal reflux disease)     High cholesterol     Hypertension     Pseudotumor cerebri     Seizures     Sleep apnea     Type 2 diabetes mellitus        Past Surgical History  Past Surgical History:   Procedure Laterality Date     SECTION      CHOLECYSTECTOMY      SINUS SURGERY         Family History  Family History   Problem Relation Age of Onset    Cancer Mother     Hypertension Mother     Diabetes Mother     Stroke Father     Heart disease Father     COPD Father     Heart attack Father     Cancer Maternal Grandmother        Social History  Social History     Social History    Marital status:      Spouse name: N/A    Number of children: N/A    Years of education: N/A     Occupational History    Not on file.     Social History Main Topics    Smoking status: Current Every Day Smoker     Packs/day: 1.00     Years: 15.00     Types: Cigarettes     Last attempt to quit: 6/10/2015    Smokeless tobacco: Never Used    Alcohol use No    Drug use: No    Sexual activity: Yes     Partners: Male     Birth control/  protection: None     Other Topics Concern    Not on file     Social History Narrative    No narrative on file       Current Medications  Current Outpatient Prescriptions on File Prior to Visit   Medication Sig Dispense Refill    ACCU-CHEK ELVIA PLUS METER Misc TEST FOUR TIMES DAILY BEFORE MEALS AND EVERY NIGHT 90 each 1    acetaZOLAMIDE (DIAMOX) 500 mg CpSR Take 500 mg by mouth 2 (two) times daily.      albuterol (ACCUNEB) 1.25 mg/3 mL Nebu Take 3 mLs (1.25 mg total) by nebulization every 6 (six) hours as needed. Rescue 100 mL 0    albuterol 90 mcg/actuation inhaler Inhale 2 puffs into the lungs every 6 (six) hours as needed for Wheezing. Rescue 18 g 0    albuterol-ipratropium 2.5mg-0.5mg/3mL (DUO-NEB) 0.5 mg-3 mg(2.5 mg base)/3 mL nebulizer solution Take 3 mLs by nebulization every 6 (six) hours as needed for Wheezing or Shortness of Breath. Rescue 100 mL 0    atorvastatin (LIPITOR) 20 MG tablet Take 1 tablet (20 mg total) by mouth once daily. 90 tablet 1    blood sugar diagnostic Strp 1 each by Misc.(Non-Drug; Combo Route) route 4 (four) times daily before meals and nightly. ACCU CHEK ELVIA PLUS METER 200 each 3    capsaicin 0.1 % Crea Apply 1 application topically 2 (two) times daily. 56.6 g 1    fluticasone (FLONASE) 50 mcg/actuation nasal spray 1 spray by Each Nare route daily as needed. 16 g 1    fluticasone-vilanterol (BREO ELLIPTA) 200-25 mcg/dose DsDv diskus inhaler Inhale 1 puff into the lungs once daily. 1 each 3    gabapentin (NEURONTIN) 300 MG capsule Take 2 capsules (600 mg total) by mouth every evening. 180 capsule 11    hydrOXYzine pamoate (VISTARIL) 50 MG Cap Take 1 capsule (50 mg total) by mouth every 8 (eight) hours as needed (itching). 30 capsule 1    lancets (ACCU-CHEK SOFTCLIX LANCETS) Misc 1 Device by Misc.(Non-Drug; Combo Route) route 4 (four) times daily. 200 each 3    lancets 25 gauge Misc 1 lancet by Misc.(Non-Drug; Combo Route) route 4 (four) times daily before meals and  nightly. 200 each 11    LINZESS 290 mcg Cap TK ONE CAPSULE PO D ON AN EMPTY STOMACH  5    losartan (COZAAR) 100 MG tablet Take 1 tablet (100 mg total) by mouth once daily. 90 tablet 3    metformin (GLUCOPHAGE-XR) 500 MG 24 hr tablet Take 2 tablets (1,000 mg total) by mouth 2 (two) times daily with meals. 360 tablet 0    montelukast (SINGULAIR) 10 mg tablet   1    NOVOLOG FLEXPEN 100 unit/mL InPn pen ADM 15 UNITS SC TID B MEALS  11    pantoprazole (PROTONIX) 40 MG tablet once daily.   0    propranolol (INDERAL) 20 MG tablet Take 1 tablet (20 mg total) by mouth 2 (two) times daily. 60 tablet 0    quetiapine (SEROQUEL) 25 MG Tab Take 1 tablet (25 mg total) by mouth every evening. 30 tablet 0    TAZTIA  mg CpSR TK ONE CAPSULE PO D  2    topiramate (TOPAMAX) 100 MG tablet Take 3 tablets (300 mg total) by mouth 2 (two) times daily. Please start taking 2 tablet (200 mg) twice a day for the next week. Then increase to additional tablet in evenings (3 tablets at night and 2 in morning) for 1 week. Then increase to 3 tablets in morning and 3 tablets in evening. 90 tablet 11    venlafaxine (EFFEXOR-XR) 75 MG 24 hr capsule Take 75 mg by mouth once daily.      [DISCONTINUED] ibuprofen (ADVIL,MOTRIN) 600 MG tablet Take 1 tablet (600 mg total) by mouth every 8 (eight) hours as needed for Pain. 15 tablet 0    hydrocortisone 2.5 % cream Apply topically 2 (two) times daily. 20 g 0     No current facility-administered medications on file prior to visit.        Allergies   Review of patient's allergies indicates:  No Known Allergies    Review of Systems (Pertinent positives)  Review of Systems   Constitutional: Negative.    HENT: Negative.    Eyes: Negative.    Respiratory: Negative.    Cardiovascular: Negative.    Gastrointestinal: Negative.    Musculoskeletal: Positive for back pain and myalgias.   Skin: Negative.    Neurological: Negative.          /80 (BP Location: Right arm, Patient Position: Sitting, BP  "Method: Manual)   Pulse 80   Temp 98.3 °F (36.8 °C) (Oral)   Resp 14   Ht 5' 4" (1.626 m)   Wt (!) 160.1 kg (352 lb 15.3 oz)   LMP  (LMP Unknown)   SpO2 98%   BMI 60.58 kg/m²     GENERAL APPEARANCE: in no apparent distress and well developed and well nourished  HEENT: PERRL, EOMI, Sclera clear, anicteric, Oropharynx clear, no lesions, Neck supple with midline trachea  NECK: normal, supple, no adenopathy, thyroid normal in size  RESPIRATORY: appears well, vitals normal, no respiratory distress, acyanotic, normal RR, chest clear, no wheezing, crepitations, rhonchi, normal symmetric air entry  HEART: regular rate and rhythm, S1, S2 normal, no murmur, click, rub or gallop.    ABDOMEN: abdomen is soft without tenderness, no masses, no hernias, no organomegaly, no rebound, no guarding. Suprapubic tenderness absent. No CVA tenderness.  MS:  Pain on palpation of plantar surface of bilateral feet  SKIN: no rashes, no wounds, no other lesions  PSYCH: Alert, oriented x 3, thought content appropriate, speech normal, pleasant and cooperative, good eye contact, well groomed1.     Assessment/Plan:  Yanni Kaiser is a 46 y.o. female who presents today for :    Yanni was seen today for back pain and headache.    Diagnoses and all orders for this visit:    Bilateral plantar fasciitis  -     hydrocodone-ibuprofen 7.5-200 mg (VICOPROFEN) 7.5-200 mg per tablet; Take 1 tablet by mouth every 8 (eight) hours as needed for Pain.    1.  Patient to take medications as prescribed  2.  Follow up with podiatry  3.  She is to call with any concerns      Carlyle Gordillo MD      "

## 2017-07-26 ENCOUNTER — TELEPHONE (OUTPATIENT)
Dept: FAMILY MEDICINE | Facility: CLINIC | Age: 47
End: 2017-07-26

## 2017-07-26 NOTE — TELEPHONE ENCOUNTER
----- Message from Jenniffer Mack sent at 7/26/2017 11:37 AM CDT -----  Contact: SELF  Pt stated that the script for hydrocodone-ibuprofen 7.5-200 mg (VICOPROFEN) 7.5-200 mg per tablet is making her sick. Please advise. [phone]

## 2017-07-27 DIAGNOSIS — M72.2 PLANTAR FASCIITIS, BILATERAL: Primary | ICD-10-CM

## 2017-07-27 RX ORDER — HYDROCODONE BITARTRATE AND ACETAMINOPHEN 5; 325 MG/1; MG/1
1 TABLET ORAL EVERY 8 HOURS PRN
Qty: 90 TABLET | Refills: 0 | Status: SHIPPED | OUTPATIENT
Start: 2017-07-27 | End: 2017-09-01

## 2017-07-27 NOTE — TELEPHONE ENCOUNTER
Patient came into the office with c/o of nausea and vomiting while take Vicophren; she stated that she can not take it. Dr. Gordillo reordered a script of Tres.

## 2017-07-31 ENCOUNTER — OUTPATIENT CASE MANAGEMENT (OUTPATIENT)
Dept: ADMINISTRATIVE | Facility: OTHER | Age: 47
End: 2017-07-31

## 2017-07-31 NOTE — PROGRESS NOTES
"Chart reviewed.  Telephonic follow-up with patient.  Patient states that she continues to have "ups and downs" with depression/anxiety but feels she knows how to cope during the "downs."  Patient said she did not call Cassandra Rockweiller to schedule and an appointment because she did not have time. She said she felt the last meeting was beneficial and she would like to schedule a follow-up visit. The telephone number was again provided and patient will call to schedule appointment.  Patient will discuss whether or not she should schedule an appointment with Dr. De Paz, [facility], at her next appointment with Ms. Rockweiller.  Patient said she has devised a plan to engage in more activities/socialization.  She said she has been to Worship every day this week and is again singing in the choir.      Follow-up to:  Ensure that mental health appointments are scheduled and attended.  Provide ongoing support as needed.    "

## 2017-08-03 ENCOUNTER — TELEPHONE (OUTPATIENT)
Dept: FAMILY MEDICINE | Facility: CLINIC | Age: 47
End: 2017-08-03

## 2017-08-03 ENCOUNTER — OUTPATIENT CASE MANAGEMENT (OUTPATIENT)
Dept: ADMINISTRATIVE | Facility: OTHER | Age: 47
End: 2017-08-03

## 2017-08-03 DIAGNOSIS — E11.9 TYPE 2 DIABETES MELLITUS WITHOUT COMPLICATION: ICD-10-CM

## 2017-08-03 NOTE — TELEPHONE ENCOUNTER
----- Message from Makenzie Delgado RN sent at 8/3/2017  1:44 PM CDT -----  I just spoke with Ian Berry. She is faxing a form that needs Dr. Gordillo's signature, time and date so she can order pt's shoes. She is faxing it over today.     Thanks,     Makenzie Delgado RN, [facility]  Outpatient Case Management  [facility]

## 2017-08-10 RX ORDER — TRAMADOL HYDROCHLORIDE 50 MG/1
TABLET ORAL
Qty: 40 TABLET | Refills: 0 | OUTPATIENT
Start: 2017-08-10

## 2017-08-11 ENCOUNTER — OUTPATIENT CASE MANAGEMENT (OUTPATIENT)
Dept: ADMINISTRATIVE | Facility: OTHER | Age: 47
End: 2017-08-11

## 2017-08-11 NOTE — PROGRESS NOTES
Chart reviewed.  Telephonic follow-up with patient.  [facility] but there was no answer.  Patient states that she is coping adequately at this time.  She denies S/I/H/I.  [facility] informed patient that she would send an Epic message to Ms. Rockweiller and request that she contact patient to schedule a follow-up visit.  Epic in-basket message sent to Cassandra Rockweiller.  All resources/referrals are in place.  All goals have been met.  Discharge discussed with patient.  Patient will call if additional support is needed in the future.  Case closed.  OPCM RN notified via Epic mail.

## 2017-08-16 ENCOUNTER — OFFICE VISIT (OUTPATIENT)
Dept: PSYCHIATRY | Facility: CLINIC | Age: 47
End: 2017-08-16
Payer: MEDICARE

## 2017-08-16 DIAGNOSIS — F33.3 SEVERE EPISODE OF RECURRENT MAJOR DEPRESSIVE DISORDER, WITH PSYCHOTIC FEATURES: Primary | ICD-10-CM

## 2017-08-16 DIAGNOSIS — F41.1 ANXIETY STATE, UNSPECIFIED: ICD-10-CM

## 2017-08-16 PROCEDURE — 99499 UNLISTED E&M SERVICE: CPT | Mod: S$PBB,,, | Performed by: SOCIAL WORKER

## 2017-08-16 PROCEDURE — 90834 PSYTX W PT 45 MINUTES: CPT | Mod: S$PBB,,, | Performed by: SOCIAL WORKER

## 2017-08-16 PROCEDURE — 90834 PSYTX W PT 45 MINUTES: CPT | Mod: PBBFAC,PO | Performed by: SOCIAL WORKER

## 2017-08-16 NOTE — PROGRESS NOTES
"Individual Psychotherapy (PhD/LCSW)    8/16/2017    Site:  [facility]         Therapeutic Intervention: Met with patient.  Outpatient - Insight oriented psychotherapy 45 min - CPT code 96499 and Outpatient - Supportive psychotherapy 45 min - CPT Code 49307    Chief complaint/reason for encounter: depression and anxiety     Interval history and content of current session: Patient returned to clinic for follow up visit. Patient reports that she is doing "okay". Stated that she has been struggling with sleep a lot recently. Reports that when she does get to sleep she is having very upsetting nightmares. Reports that she is unable to remember the dream but emotions are so high that she is unable to return to sleep. Discussed that she has been having this issue for about a year. Reports that she didn't start taking any new medication at the time. Denies any incident happening that has her upset. Reports that she wakes up at 3 and is unable to return to bed. Gets up and does stuff around the house. States that she finally gets tired between 1-3 PM and takes a nap. Reports that then later she lies in bed before being able to fall asleep.  Discussed the way habits are formed. Discussed how to get the best sleep. Discussed moving nap earlier in order to not interfere with night time sleep. Discussed getting a dream journal to write down her dreams and feelings that accompany them. Discussed meditation apps.     Treatment plan:  · Target symptoms: depression, anxiety   · Why chosen therapy is appropriate versus another modality: relevant to diagnosis, evidence based practice  · Outcome monitoring methods: self-report, observation  · Therapeutic intervention type: insight oriented psychotherapy, supportive psychotherapy    Risk parameters:  Patient reports no suicidal ideation  Patient reports no homicidal ideation  Patient reports no self-injurious behavior  Patient reports no violent behavior    Verbal deficits: " None    Patient's response to intervention:  The patient's response to intervention is accepting.    Progress toward goals and other mental status changes:  The patient's progress toward goals is fair .    Diagnosis:     ICD-10-CM ICD-9-CM   1. Severe episode of recurrent major depressive disorder, with psychotic features F33.3 296.34   2. Anxiety state, unspecified F41.1 300.00       Plan:  individual psychotherapy    Return to clinic: 2 weeks, 1 month    Length of Service (minutes): 45     Cassandra Rockweiler, LCSW

## 2017-08-21 ENCOUNTER — OUTPATIENT CASE MANAGEMENT (OUTPATIENT)
Dept: ADMINISTRATIVE | Facility: OTHER | Age: 47
End: 2017-08-21

## 2017-08-21 NOTE — PROGRESS NOTES
All needs addressed. Order for diabetic shoes signed. Pt had 1st appt with LCSW for counseling. Pt has [facility] contact information for any future needs. Pt dis-enrolled from [facility]

## 2017-08-30 NOTE — PROCEDURES
EPILEPSY MONITORING UNIT  EEG/VIDEO TELEMETRY REPORT    DATE OF PROCEDURE:  07/06/2017 through 07/07/2017.    EEG#:  [id number].      METHODOLOGY:   Electroencephalographic (EEG) is recorded with electrodes placed   according to the International 10-20 placement system.  Thirty Two (32) channels   of digital signal, including T1 and T2 electrodes, are simultaneously recorded   from the scalp and may also include EKG, EMG and/or eye movement monitors.    Recording band pass was 0.1 to 512 Hz.  Digital video recording of the patient   is simultaneously recorded with the EEG.  The patient is instructed to report   clinical symptoms which may occur during the recording session.  EEG and video   recording are stored and archived in digital format. Activation procedures,   which include photic stimulation, hyperventilation and instructing patients to   perform simple tasks, are done in selected patients.   The EEG is displayed on a monitor screen and can be reformatted into different   montages for evaluation.  The entire recoding is submitted for computer-assisted   analysis to detect spike and electrographic seizure activity.  The entire   recording is visually reviewed, and the times identified by computer analysis as   being spikes or seizures are reviewed again.  Compressed spectral analysis   (CSA) is also performed on the activity recorded from each individual channel.    This is displayed as a power display of frequencies from 0 to 30 Hz over time.     The CSA analysis is done and displayed continuously.  This is reviewed for   asymmetries in power between homologous areas of the scalp and for presence of   changes in power which can be seen when seizures occur.  Sections of suspected   abnormalities on the CSA are then compared with the original EEG recording.     [employer] software was also utilized in the review of this study.  This software   suite analyzes the EEG recording in multiple domains.  Coherence and  rhythmicity   are computed to identify EEG sections which may contain organized seizures.    Each channel undergoes analysis to detect presence of spike and sharp waves   which have special and morphological characteristics of epileptic activity.  The   routine EEG recording is converted from special into frequency domain.  This is   then displayed comparing homologous areas to identify areas of significant   asymmetry.  Algorithm to identify non-cortically generated artifact is used to   separate artifact from the EEG.      RECORDING TIMES:  Start on 07/06/2017 at 17:41:20.  Stop on 07/07/2017 at  07:00:02.  A total of 13 hours 17 minutes and 54 seconds of EEG/video telemetry was   recorded.    FINDINGS:  The patient's background consists of a posteriorly dominant 10 Hz   alpha rhythm, which is well formed, well modulated and abolishes with eye   opening.  Anteriorly, the patient's background consists of mixed alpha to beta   range frequencies.  There is no focal slowing.  There are no focal, lateralized,   or epileptiform transients.  No electrographic seizures are seen.  At various   times during the study, the patient is noted to be in awake, drowsy and asleep   states with stage N2 sleep being observed.  Provocative maneuvers including   hyperventilation and photic stimulation were not noted on the record is having   been performed.    EKG demonstrates sinus rhythm.    INTERPRETATION:  This is a normal long-term video EEG monitoring study in an   awake, drowsy, and asleep adult.  None of the patient's events of interest   occurred during this portion of the recording.  Please see the reports for   subsequent parts of the study.      NICK  dd: 08/29/2017 17:21:10 (CDT)  td: 08/29/2017 22:35:09 (CDT)  Doc ID   #[id number]  Job ID #[id number]    CC:

## 2017-09-01 ENCOUNTER — OFFICE VISIT (OUTPATIENT)
Dept: FAMILY MEDICINE | Facility: CLINIC | Age: 47
End: 2017-09-01
Payer: MEDICARE

## 2017-09-01 VITALS
OXYGEN SATURATION: 98 % | RESPIRATION RATE: 20 BRPM | HEIGHT: 64 IN | SYSTOLIC BLOOD PRESSURE: 122 MMHG | HEART RATE: 80 BPM | TEMPERATURE: 99 F | WEIGHT: 293 LBS | BODY MASS INDEX: 50.02 KG/M2 | DIASTOLIC BLOOD PRESSURE: 76 MMHG

## 2017-09-01 DIAGNOSIS — M62.830 MUSCLE SPASM OF BACK: Primary | ICD-10-CM

## 2017-09-01 DIAGNOSIS — M54.41 CHRONIC BILATERAL LOW BACK PAIN WITH BILATERAL SCIATICA: ICD-10-CM

## 2017-09-01 DIAGNOSIS — G89.29 CHRONIC BILATERAL LOW BACK PAIN WITH BILATERAL SCIATICA: ICD-10-CM

## 2017-09-01 DIAGNOSIS — L30.9 DERMATITIS: ICD-10-CM

## 2017-09-01 DIAGNOSIS — E11.8 CONTROLLED TYPE 2 DIABETES MELLITUS WITH COMPLICATION, WITHOUT LONG-TERM CURRENT USE OF INSULIN: Chronic | ICD-10-CM

## 2017-09-01 DIAGNOSIS — M54.42 CHRONIC BILATERAL LOW BACK PAIN WITH BILATERAL SCIATICA: ICD-10-CM

## 2017-09-01 DIAGNOSIS — J32.1 CHRONIC FRONTAL SINUSITIS: ICD-10-CM

## 2017-09-01 PROBLEM — N17.9 AKI (ACUTE KIDNEY INJURY): Status: RESOLVED | Noted: 2017-02-21 | Resolved: 2017-09-01

## 2017-09-01 LAB — GLUCOSE SERPL-MCNC: 122 MG/DL (ref 70–110)

## 2017-09-01 PROCEDURE — 3074F SYST BP LT 130 MM HG: CPT | Mod: S$GLB,,, | Performed by: FAMILY MEDICINE

## 2017-09-01 PROCEDURE — 3078F DIAST BP <80 MM HG: CPT | Mod: S$GLB,,, | Performed by: FAMILY MEDICINE

## 2017-09-01 PROCEDURE — 99999 PR PBB SHADOW E&M-EST. PATIENT-LVL IV: CPT | Mod: PBBFAC,,, | Performed by: FAMILY MEDICINE

## 2017-09-01 PROCEDURE — 82948 REAGENT STRIP/BLOOD GLUCOSE: CPT | Mod: S$GLB,,, | Performed by: FAMILY MEDICINE

## 2017-09-01 PROCEDURE — 3008F BODY MASS INDEX DOCD: CPT | Mod: S$GLB,,, | Performed by: FAMILY MEDICINE

## 2017-09-01 PROCEDURE — 99214 OFFICE O/P EST MOD 30 MIN: CPT | Mod: S$GLB,,, | Performed by: FAMILY MEDICINE

## 2017-09-01 PROCEDURE — 99499 UNLISTED E&M SERVICE: CPT | Mod: S$GLB,,, | Performed by: FAMILY MEDICINE

## 2017-09-01 RX ORDER — AMOXICILLIN AND CLAVULANATE POTASSIUM 875; 125 MG/1; MG/1
1 TABLET, FILM COATED ORAL 2 TIMES DAILY
Qty: 20 TABLET | Refills: 0 | Status: SHIPPED | OUTPATIENT
Start: 2017-09-01 | End: 2017-09-11

## 2017-09-01 RX ORDER — TIZANIDINE HYDROCHLORIDE 6 MG/1
6 CAPSULE, GELATIN COATED ORAL 3 TIMES DAILY
Qty: 30 CAPSULE | Refills: 0 | Status: SHIPPED | OUTPATIENT
Start: 2017-09-01 | End: 2017-09-11

## 2017-09-01 RX ORDER — HYDROXYZINE PAMOATE 50 MG/1
100 CAPSULE ORAL EVERY 8 HOURS PRN
Qty: 90 CAPSULE | Refills: 0 | Status: SHIPPED | OUTPATIENT
Start: 2017-09-01 | End: 2018-01-04

## 2017-09-01 RX ORDER — OXYCODONE AND ACETAMINOPHEN 7.5; 325 MG/1; MG/1
1 TABLET ORAL EVERY 8 HOURS PRN
Qty: 90 TABLET | Refills: 0 | Status: SHIPPED | OUTPATIENT
Start: 2017-09-01 | End: 2017-11-08

## 2017-09-01 NOTE — PROGRESS NOTES
"Routine Office Visit    Patient Name: Yanni Kaiser    : 1970  MRN: [id number]    Subjective:  Yanni is a 47 y.o. female who presents today for:    1. Congestion  Patient presenting with one week of sinus congestion and pain.  There has been no fever, but she has had fatigue.  She has a chronic cough that has worsened.  She is diagnosed with asthma and does continue to smoke.  She has not been using her inhalers as prescribed.  There has been some wheezing in the past few days, but not today.  No shortness of breath at this time.  Her cough today has been dry.  No hemoptysis or cough induced emesis.    2.  Lower back pain  Patient has chronic low back pain that has been exacerbated the past few days.  She states that the pain radiates into both buttock. There has been no weakness or incontinence.  She states that she has not taken anything other than ibuprofen for the pain.  There has been no numbness or tingling in her legs.      3.  Chronic itching  Patient suffers with chronic recurring itching "of my whole body".  She does have diabetes that has been well controlled recently.  She denies any known food or environmental allergies.  There have been no hives.  She states that occasionally her skin will be red, but that resolves spontaneously.    Past Medical History  Past Medical History:   Diagnosis Date    Allergy     Asthma     Diabetes mellitus, type 2     GERD (gastroesophageal reflux disease)     High cholesterol     Hypertension     Pseudotumor cerebri     Seizures     Sleep apnea     Type 2 diabetes mellitus        Past Surgical History  Past Surgical History:   Procedure Laterality Date     SECTION      CHOLECYSTECTOMY      SINUS SURGERY         Family History  Family History   Problem Relation Age of Onset    Cancer Mother     Hypertension Mother     Diabetes Mother     Stroke Father     Heart disease Father     COPD Father     Heart attack Father     Cancer " Maternal Grandmother        Social History  Social History     Social History    Marital status:      Spouse name: N/A    Number of children: N/A    Years of education: N/A     Occupational History    Not on file.     Social History Main Topics    Smoking status: Current Every Day Smoker     Packs/day: 1.00     Years: 15.00     Types: Cigarettes     Last attempt to quit: 6/10/2015    Smokeless tobacco: Never Used    Alcohol use No    Drug use: No    Sexual activity: Yes     Partners: Male     Birth control/ protection: None     Other Topics Concern    Not on file     Social History Narrative    No narrative on file       Current Medications  Current Outpatient Prescriptions on File Prior to Visit   Medication Sig Dispense Refill    ACCU-CHEK ELVIA PLUS METER Misc TEST FOUR TIMES DAILY BEFORE MEALS AND EVERY NIGHT 90 each 1    acetaZOLAMIDE (DIAMOX) 500 mg CpSR Take 500 mg by mouth 2 (two) times daily.      albuterol (ACCUNEB) 1.25 mg/3 mL Nebu Take 3 mLs (1.25 mg total) by nebulization every 6 (six) hours as needed. Rescue 100 mL 0    albuterol 90 mcg/actuation inhaler Inhale 2 puffs into the lungs every 6 (six) hours as needed for Wheezing. Rescue 18 g 0    albuterol-ipratropium 2.5mg-0.5mg/3mL (DUO-NEB) 0.5 mg-3 mg(2.5 mg base)/3 mL nebulizer solution Take 3 mLs by nebulization every 6 (six) hours as needed for Wheezing or Shortness of Breath. Rescue 100 mL 0    atorvastatin (LIPITOR) 20 MG tablet Take 1 tablet (20 mg total) by mouth once daily. 90 tablet 1    blood sugar diagnostic Strp 1 each by Misc.(Non-Drug; Combo Route) route 4 (four) times daily before meals and nightly. ACCU CHEK ELVIA PLUS METER 200 each 3    capsaicin 0.1 % Crea Apply 1 application topically 2 (two) times daily. 56.6 g 1    fluticasone (FLONASE) 50 mcg/actuation nasal spray 1 spray by Each Nare route daily as needed. 16 g 1    fluticasone-vilanterol (BREO ELLIPTA) 200-25 mcg/dose DsDv diskus inhaler Inhale 1  puff into the lungs once daily. 1 each 3    gabapentin (NEURONTIN) 300 MG capsule Take 2 capsules (600 mg total) by mouth every evening. 180 capsule 11    lancets (ACCU-CHEK SOFTCLIX LANCETS) Misc 1 Device by Misc.(Non-Drug; Combo Route) route 4 (four) times daily. 200 each 3    lancets 25 gauge Misc 1 lancet by Misc.(Non-Drug; Combo Route) route 4 (four) times daily before meals and nightly. 200 each 11    LINZESS 290 mcg Cap TK ONE CAPSULE PO D ON AN EMPTY STOMACH  5    losartan (COZAAR) 100 MG tablet Take 1 tablet (100 mg total) by mouth once daily. 90 tablet 3    metformin (GLUCOPHAGE-XR) 500 MG 24 hr tablet Take 2 tablets (1,000 mg total) by mouth 2 (two) times daily with meals. 360 tablet 0    montelukast (SINGULAIR) 10 mg tablet   1    NOVOLOG FLEXPEN 100 unit/mL InPn pen ADM 15 UNITS SC TID B MEALS  11    pantoprazole (PROTONIX) 40 MG tablet once daily.   0    propranolol (INDERAL) 20 MG tablet Take 1 tablet (20 mg total) by mouth 2 (two) times daily. 60 tablet 0    quetiapine (SEROQUEL) 25 MG Tab Take 1 tablet (25 mg total) by mouth every evening. 30 tablet 0    TAZTIA  mg CpSR TK ONE CAPSULE PO D  2    topiramate (TOPAMAX) 100 MG tablet Take 3 tablets (300 mg total) by mouth 2 (two) times daily. Please start taking 2 tablet (200 mg) twice a day for the next week. Then increase to additional tablet in evenings (3 tablets at night and 2 in morning) for 1 week. Then increase to 3 tablets in morning and 3 tablets in evening. 90 tablet 11    venlafaxine (EFFEXOR-XR) 75 MG 24 hr capsule Take 75 mg by mouth once daily.      [DISCONTINUED] hydrocodone-acetaminophen 5-325mg (NORCO) 5-325 mg per tablet Take 1 tablet by mouth every 8 (eight) hours as needed for Pain. 90 tablet 0    [DISCONTINUED] hydrOXYzine pamoate (VISTARIL) 50 MG Cap Take 1 capsule (50 mg total) by mouth every 8 (eight) hours as needed (itching). 30 capsule 1    hydrocortisone 2.5 % cream Apply topically 2 (two) times daily.  "20 g 0     No current facility-administered medications on file prior to visit.        Allergies   Review of patient's allergies indicates:  No Known Allergies    Review of Systems (Pertinent positives)  Review of Systems   Constitutional: Positive for malaise/fatigue.   HENT: Positive for congestion.    Eyes: Negative.    Respiratory: Positive for cough and wheezing. Negative for hemoptysis, sputum production and shortness of breath.    Cardiovascular: Negative.    Gastrointestinal: Negative.    Musculoskeletal: Positive for back pain and myalgias. Negative for falls, joint pain and neck pain.   Skin: Negative.    Neurological: Negative.          /76 (BP Location: Right arm, Patient Position: Sitting, BP Method: Large (Manual))   Pulse 80   Temp 98.7 °F (37.1 °C) (Oral)   Resp 20   Ht 5' 4" (1.626 m)   Wt (!) 156.7 kg (345 lb 7.4 oz)   SpO2 98%   BMI 59.30 kg/m²     GENERAL APPEARANCE: in no apparent distress and well developed and well nourished  HEENT: PERRL, EOMI, Sclera clear, anicteric, Oropharynx clear, no lesions, Neck supple with midline trachea  NECK: normal, supple, no adenopathy, thyroid normal in size  RESPIRATORY: appears well, vitals normal, no respiratory distress, acyanotic, normal RR, chest clear, no wheezing, crepitations, rhonchi, normal symmetric air entry  HEART: regular rate and rhythm, S1, S2 normal, no murmur, click, rub or gallop.    ABDOMEN: abdomen is soft without tenderness, no masses, no hernias, no organomegaly, no rebound, no guarding. Suprapubic tenderness absent. No CVA tenderness.  NEUROLOGIC: normal without focal findings, CN II-XII are intact.  Sensation in tact  MS:  Pain on palpation of paraspinal muscles and psoas muscles  SKIN: no rashes, no wounds, no other lesions  PSYCH: Alert, oriented x 3, thought content appropriate, speech normal, pleasant and cooperative, good eye contact, well groomed    Assessment/Plan:  Yanni Kaiser is a 47 y.o. female who " presents today for :    Yanni was seen today for chest congestion and rash.    Diagnoses and all orders for this visit:    Muscle spasm of back  -     tizanidine (ZANAFLEX) 6 mg capsule; Take 1 capsule (6 mg total) by mouth 3 (three) times daily.    Dermatitis  -     hydrOXYzine pamoate (VISTARIL) 50 MG Cap; Take 2 capsules (100 mg total) by mouth every 8 (eight) hours as needed (itching).  -     Ambulatory referral to Dermatology    Chronic frontal sinusitis  -     amoxicillin-clavulanate 875-125mg (AUGMENTIN) 875-125 mg per tablet; Take 1 tablet by mouth 2 (two) times daily.    Chronic bilateral low back pain with bilateral sciatica  -     oxycodone-acetaminophen (PERCOCET) 7.5-325 mg per tablet; Take 1 tablet by mouth every 8 (eight) hours as needed for Pain.      1.  Lung exam benign  2.  Will refill medication for chronic itching and refer to derm  3. augmentin for sinus infection  4.  Percocet as prescribed for chronic back pain  5.  She is to call with any concerns.

## 2017-09-05 ENCOUNTER — TELEPHONE (OUTPATIENT)
Dept: FAMILY MEDICINE | Facility: CLINIC | Age: 47
End: 2017-09-05

## 2017-09-05 NOTE — TELEPHONE ENCOUNTER
----- Message from Edna Minor sent at 9/5/2017 10:12 AM CDT -----  Contact: Luiz  Pharmacist calling to change med below. Please call [phone].      tizanidine (ZANAFLEX) 6 mg capsule

## 2017-09-05 NOTE — TELEPHONE ENCOUNTER
Tizanidine 6mg is not covered by the [employer] Plan but cyclobenzaprine is. Please send an rx of the covered medication to the [facility]'s on file.

## 2017-09-06 ENCOUNTER — TELEPHONE (OUTPATIENT)
Dept: FAMILY MEDICINE | Facility: CLINIC | Age: 47
End: 2017-09-06

## 2017-09-06 NOTE — TELEPHONE ENCOUNTER
----- Message from Iesha Oseguera sent at 9/6/2017  9:39 AM CDT -----  Contact: Chandrakant Dee   Patient's insurance will not cover the strength for patient's medication.     tizanidine (ZANAFLEX) 6 mg capsule      Luiz [phone]

## 2017-09-06 NOTE — TELEPHONE ENCOUNTER
[facility] called stating they need to change the patient's zanaflex to 2mg...taking 3 pills 3 times a day,given 90 pills for a 10 day supply for the insurance company to cover it. Per Dr. Gordillo he ok me to change it. Sending to him to sign off. Thanks

## 2017-09-18 DIAGNOSIS — E11.9 CONTROLLED TYPE 2 DIABETES MELLITUS WITHOUT COMPLICATION, WITHOUT LONG-TERM CURRENT USE OF INSULIN: Chronic | ICD-10-CM

## 2017-09-18 RX ORDER — METFORMIN HYDROCHLORIDE 500 MG/1
TABLET, EXTENDED RELEASE ORAL
Qty: 90 TABLET | Refills: 0 | Status: SHIPPED | OUTPATIENT
Start: 2017-09-18 | End: 2018-01-22 | Stop reason: DRUGHIGH

## 2017-09-21 ENCOUNTER — OFFICE VISIT (OUTPATIENT)
Dept: FAMILY MEDICINE | Facility: CLINIC | Age: 47
End: 2017-09-21
Payer: MEDICARE

## 2017-09-21 ENCOUNTER — APPOINTMENT (OUTPATIENT)
Dept: RADIOLOGY | Facility: HOSPITAL | Age: 47
End: 2017-09-21
Attending: FAMILY MEDICINE
Payer: MEDICARE

## 2017-09-21 VITALS
DIASTOLIC BLOOD PRESSURE: 88 MMHG | BODY MASS INDEX: 50.02 KG/M2 | SYSTOLIC BLOOD PRESSURE: 120 MMHG | TEMPERATURE: 98 F | WEIGHT: 293 LBS | OXYGEN SATURATION: 95 % | HEART RATE: 82 BPM | RESPIRATION RATE: 24 BRPM | HEIGHT: 64 IN

## 2017-09-21 DIAGNOSIS — Z72.0 TOBACCO ABUSE: Chronic | ICD-10-CM

## 2017-09-21 DIAGNOSIS — N18.30 TYPE 2 DIABETES MELLITUS WITH STAGE 3 CHRONIC KIDNEY DISEASE, WITHOUT LONG-TERM CURRENT USE OF INSULIN: ICD-10-CM

## 2017-09-21 DIAGNOSIS — J41.0 SIMPLE CHRONIC BRONCHITIS: Primary | ICD-10-CM

## 2017-09-21 DIAGNOSIS — Z23 IMMUNIZATION DUE: ICD-10-CM

## 2017-09-21 DIAGNOSIS — E11.22 TYPE 2 DIABETES MELLITUS WITH STAGE 3 CHRONIC KIDNEY DISEASE, WITHOUT LONG-TERM CURRENT USE OF INSULIN: ICD-10-CM

## 2017-09-21 DIAGNOSIS — J41.0 SIMPLE CHRONIC BRONCHITIS: ICD-10-CM

## 2017-09-21 DIAGNOSIS — Z23 NEEDS FLU SHOT: ICD-10-CM

## 2017-09-21 PROCEDURE — 90686 IIV4 VACC NO PRSV 0.5 ML IM: CPT | Mod: S$GLB,,, | Performed by: FAMILY MEDICINE

## 2017-09-21 PROCEDURE — 3044F HG A1C LEVEL LT 7.0%: CPT | Mod: S$GLB,,, | Performed by: FAMILY MEDICINE

## 2017-09-21 PROCEDURE — 71020 XR CHEST PA AND LATERAL: CPT | Mod: TC,PN

## 2017-09-21 PROCEDURE — 3079F DIAST BP 80-89 MM HG: CPT | Mod: S$GLB,,, | Performed by: FAMILY MEDICINE

## 2017-09-21 PROCEDURE — 99999 PR PBB SHADOW E&M-EST. PATIENT-LVL III: CPT | Mod: PBBFAC,,, | Performed by: FAMILY MEDICINE

## 2017-09-21 PROCEDURE — 99499 UNLISTED E&M SERVICE: CPT | Mod: S$GLB,,, | Performed by: FAMILY MEDICINE

## 2017-09-21 PROCEDURE — 71020 XR CHEST PA AND LATERAL: CPT | Mod: 26,,, | Performed by: RADIOLOGY

## 2017-09-21 PROCEDURE — 99214 OFFICE O/P EST MOD 30 MIN: CPT | Mod: S$GLB,,, | Performed by: FAMILY MEDICINE

## 2017-09-21 PROCEDURE — 4010F ACE/ARB THERAPY RXD/TAKEN: CPT | Mod: S$GLB,,, | Performed by: FAMILY MEDICINE

## 2017-09-21 PROCEDURE — 3008F BODY MASS INDEX DOCD: CPT | Mod: S$GLB,,, | Performed by: FAMILY MEDICINE

## 2017-09-21 PROCEDURE — G0008 ADMIN INFLUENZA VIRUS VAC: HCPCS | Mod: S$GLB,,, | Performed by: FAMILY MEDICINE

## 2017-09-21 PROCEDURE — 3074F SYST BP LT 130 MM HG: CPT | Mod: S$GLB,,, | Performed by: FAMILY MEDICINE

## 2017-09-21 RX ORDER — FLUCONAZOLE 150 MG/1
150 TABLET ORAL ONCE
Qty: 1 TABLET | Refills: 0 | Status: SHIPPED | OUTPATIENT
Start: 2017-09-21 | End: 2017-09-21

## 2017-09-21 RX ORDER — AZITHROMYCIN 250 MG/1
TABLET, FILM COATED ORAL
Qty: 6 TABLET | Refills: 0 | Status: SHIPPED | OUTPATIENT
Start: 2017-09-21 | End: 2017-09-26

## 2017-09-21 NOTE — PROGRESS NOTES
Routine Office Visit    Patient Name: Yanni Kaiser    : 1970  MRN: [id number]    Subjective:  Yanni is a 47 y.o. female who presents today for:    1. Cough and fatigue  Patient presenting today with cough and fatigue since .  No documented fevers.  The cough is occasionally productive for phlegm, but no hemoptysis or cough induced emesis.  She is asthmatic and does continue to smoke.  She has not been using inhalers as prescribed.  She states that she has been taking all of there other medications as prescribed.  She does complain of muscle aches as well.  There has been no n/v/d.     Past Medical History  Past Medical History:   Diagnosis Date    Allergy     Asthma     Diabetes mellitus, type 2     GERD (gastroesophageal reflux disease)     High cholesterol     Hypertension     Pseudotumor cerebri     Seizures     Sleep apnea     Type 2 diabetes mellitus        Past Surgical History  Past Surgical History:   Procedure Laterality Date     SECTION      CHOLECYSTECTOMY      SINUS SURGERY         Family History  Family History   Problem Relation Age of Onset    Cancer Mother     Hypertension Mother     Diabetes Mother     Stroke Father     Heart disease Father     COPD Father     Heart attack Father     Cancer Maternal Grandmother        Social History  Social History     Social History    Marital status:      Spouse name: N/A    Number of children: N/A    Years of education: N/A     Occupational History    Not on file.     Social History Main Topics    Smoking status: Current Every Day Smoker     Packs/day: 1.00     Years: 15.00     Types: Cigarettes     Last attempt to quit: 6/10/2015    Smokeless tobacco: Never Used    Alcohol use No    Drug use: No    Sexual activity: Yes     Partners: Male     Birth control/ protection: None     Other Topics Concern    Not on file     Social History Narrative    No narrative on file       Current  Medications  Current Outpatient Prescriptions on File Prior to Visit   Medication Sig Dispense Refill    ACCU-CHEK ELVIA PLUS METER Misc TEST FOUR TIMES DAILY BEFORE MEALS AND EVERY NIGHT 90 each 1    acetaZOLAMIDE (DIAMOX) 500 mg CpSR Take 500 mg by mouth 2 (two) times daily.      albuterol (ACCUNEB) 1.25 mg/3 mL Nebu Take 3 mLs (1.25 mg total) by nebulization every 6 (six) hours as needed. Rescue 100 mL 0    albuterol 90 mcg/actuation inhaler Inhale 2 puffs into the lungs every 6 (six) hours as needed for Wheezing. Rescue 18 g 0    albuterol-ipratropium 2.5mg-0.5mg/3mL (DUO-NEB) 0.5 mg-3 mg(2.5 mg base)/3 mL nebulizer solution Take 3 mLs by nebulization every 6 (six) hours as needed for Wheezing or Shortness of Breath. Rescue 100 mL 0    atorvastatin (LIPITOR) 20 MG tablet Take 1 tablet (20 mg total) by mouth once daily. 90 tablet 1    blood sugar diagnostic Strp 1 each by Misc.(Non-Drug; Combo Route) route 4 (four) times daily before meals and nightly. ACCU CHEK ELVIA PLUS METER 200 each 3    capsaicin 0.1 % Crea Apply 1 application topically 2 (two) times daily. 56.6 g 1    fluticasone (FLONASE) 50 mcg/actuation nasal spray 1 spray by Each Nare route daily as needed. 16 g 1    fluticasone-vilanterol (BREO ELLIPTA) 200-25 mcg/dose DsDv diskus inhaler Inhale 1 puff into the lungs once daily. 1 each 3    gabapentin (NEURONTIN) 300 MG capsule Take 2 capsules (600 mg total) by mouth every evening. 180 capsule 11    hydrOXYzine pamoate (VISTARIL) 50 MG Cap Take 2 capsules (100 mg total) by mouth every 8 (eight) hours as needed (itching). 90 capsule 0    lancets (ACCU-CHEK SOFTCLIX LANCETS) Misc 1 Device by Misc.(Non-Drug; Combo Route) route 4 (four) times daily. 200 each 3    lancets 25 gauge Misc 1 lancet by Misc.(Non-Drug; Combo Route) route 4 (four) times daily before meals and nightly. 200 each 11    LINZESS 290 mcg Cap TK ONE CAPSULE PO D ON AN EMPTY STOMACH  5    losartan (COZAAR) 100 MG tablet  Take 1 tablet (100 mg total) by mouth once daily. 90 tablet 3    metformin (GLUCOPHAGE-XR) 500 MG 24 hr tablet Take 2 tablets (1,000 mg total) by mouth 2 (two) times daily with meals. 360 tablet 0    metformin (GLUCOPHAGE-XR) 500 MG 24 hr tablet TAKE 1 TABLET(500 MG) BY MOUTH EVERY DAY 90 tablet 0    montelukast (SINGULAIR) 10 mg tablet   1    NOVOLOG FLEXPEN 100 unit/mL InPn pen ADM 15 UNITS SC TID B MEALS  11    oxycodone-acetaminophen (PERCOCET) 7.5-325 mg per tablet Take 1 tablet by mouth every 8 (eight) hours as needed for Pain. 90 tablet 0    pantoprazole (PROTONIX) 40 MG tablet once daily.   0    propranolol (INDERAL) 20 MG tablet Take 1 tablet (20 mg total) by mouth 2 (two) times daily. 60 tablet 0    quetiapine (SEROQUEL) 25 MG Tab Take 1 tablet (25 mg total) by mouth every evening. 30 tablet 0    TAZTIA  mg CpSR TK ONE CAPSULE PO D  2    topiramate (TOPAMAX) 100 MG tablet Take 3 tablets (300 mg total) by mouth 2 (two) times daily. Please start taking 2 tablet (200 mg) twice a day for the next week. Then increase to additional tablet in evenings (3 tablets at night and 2 in morning) for 1 week. Then increase to 3 tablets in morning and 3 tablets in evening. 90 tablet 11    venlafaxine (EFFEXOR-XR) 75 MG 24 hr capsule Take 75 mg by mouth once daily.      hydrocortisone 2.5 % cream Apply topically 2 (two) times daily. 20 g 0     No current facility-administered medications on file prior to visit.        Allergies   Review of patient's allergies indicates:  No Known Allergies    Review of Systems (Pertinent positives)  Review of Systems   Constitutional: Positive for malaise/fatigue. Negative for chills and fever.   HENT: Negative.    Eyes: Negative.    Respiratory: Positive for cough and sputum production. Negative for hemoptysis, shortness of breath and wheezing.    Cardiovascular: Negative.    Gastrointestinal: Negative.    Genitourinary: Negative.    Musculoskeletal: Positive for  "myalgias.   Skin: Negative.          /88   Pulse 82   Temp 98.3 °F (36.8 °C) (Oral)   Resp (!) 24   Ht 5' 4" (1.626 m)   Wt (!) 158 kg (348 lb 5.2 oz)   SpO2 95%   BMI 59.79 kg/m²     GENERAL APPEARANCE: in no apparent distress and well developed and well nourished  HEENT: PERRL, EOMI, Sclera clear, anicteric, Oropharynx clear, no lesions, Neck supple with midline trachea  NECK: normal, supple, no adenopathy, thyroid normal in size  RESPIRATORY: appears well, vitals normal, no respiratory distress, acyanotic, normal RR, chest clear, no wheezing, crepitations, rhonchi, normal symmetric air entry  HEART: regular rate and rhythm, S1, S2 normal, no murmur, click, rub or gallop.    ABDOMEN: abdomen is soft without tenderness, no masses, no hernias, no organomegaly, no rebound, no guarding. Suprapubic tenderness absent. No CVA tenderness.  SKIN: no rashes, no wounds, no other lesions  PSYCH: Alert, oriented x 3, thought content appropriate, speech normal, pleasant and cooperative, good eye contact, well groomed    Assessment/Plan:  Yanni Kaiser is a 47 y.o. female who presents today for :    Yanni was seen today for cough and results.    Diagnoses and all orders for this visit:    Simple chronic bronchitis  -     X-Ray Chest PA And Lateral; Future  -     azithromycin (Z-RHODA) 250 MG tablet; Take 2 tablets by mouth on day 1; Take 1 tablet by mouth on days 2-5  -     CBC auto differential; Future  -     fluconazole (DIFLUCAN) 150 MG Tab; Take 1 tablet (150 mg total) by mouth once.    Type 2 diabetes mellitus with stage 3 chronic kidney disease, without long-term current use of insulin  -     Lipid panel; Future    Tobacco abuse    Immunization due  -     Influenza - Quadrivalent (3 years & older) (PF)    Needs flu shot      1.  Labs and xray to be done today  2.  Flu shot given as she has no fever and is at high risk for complications  3.  Follow up in 3 months or sooner if needed  4.  She was " instructed to use inhalers as prescribed    Carlyle Gordillo MD

## 2017-09-22 ENCOUNTER — TELEPHONE (OUTPATIENT)
Dept: FAMILY MEDICINE | Facility: CLINIC | Age: 47
End: 2017-09-22

## 2017-09-22 NOTE — TELEPHONE ENCOUNTER
----- Message from Carlyle Gordillo MD sent at 9/21/2017  1:58 PM CDT -----  Please let patient know that her labs were fine and that her xray was negative.    Dr. Gordillo

## 2017-10-10 ENCOUNTER — TELEPHONE (OUTPATIENT)
Dept: FAMILY MEDICINE | Facility: CLINIC | Age: 47
End: 2017-10-10

## 2017-10-10 DIAGNOSIS — Z02.71 DISABILITY EXAMINATION: Primary | ICD-10-CM

## 2017-10-10 NOTE — TELEPHONE ENCOUNTER
Pt. Is needing referral for Occupational Medicine for Dr. Bower. Per patient, she checked with insurance and Dr. Bower is covered under her insurance.

## 2017-10-11 ENCOUNTER — TELEPHONE (OUTPATIENT)
Dept: FAMILY MEDICINE | Facility: CLINIC | Age: 47
End: 2017-10-11

## 2017-10-11 NOTE — TELEPHONE ENCOUNTER
Pt. Called re: paperwork for student loan company stating she is disabled. Per Dr. Gordillo, he does not consider her disabled therefore he cannot fill out the form. Told patient to give form to  Dr. Díaz, her pain management doctor to fill out.

## 2017-10-11 NOTE — TELEPHONE ENCOUNTER
----- Message from Deepa Gutiérrez sent at 10/11/2017  3:20 PM CDT -----  Contact: self  Pt calling to speak with Manda about paperwork. Pt states its not about referral and she knows what she is talking about. Pt was very rude. Please call [phone]

## 2017-11-08 ENCOUNTER — OFFICE VISIT (OUTPATIENT)
Dept: FAMILY MEDICINE | Facility: CLINIC | Age: 47
End: 2017-11-08
Payer: MEDICARE

## 2017-11-08 VITALS
BODY MASS INDEX: 50.02 KG/M2 | RESPIRATION RATE: 14 BRPM | HEIGHT: 64 IN | TEMPERATURE: 98 F | HEART RATE: 79 BPM | OXYGEN SATURATION: 95 % | WEIGHT: 293 LBS

## 2017-11-08 DIAGNOSIS — J32.0 CHRONIC MAXILLARY SINUSITIS: Primary | ICD-10-CM

## 2017-11-08 DIAGNOSIS — E11.22 TYPE 2 DIABETES MELLITUS WITH STAGE 3 CHRONIC KIDNEY DISEASE, WITH LONG-TERM CURRENT USE OF INSULIN: ICD-10-CM

## 2017-11-08 DIAGNOSIS — L75.2: ICD-10-CM

## 2017-11-08 DIAGNOSIS — Z79.4 TYPE 2 DIABETES MELLITUS WITH STAGE 3 CHRONIC KIDNEY DISEASE, WITH LONG-TERM CURRENT USE OF INSULIN: ICD-10-CM

## 2017-11-08 DIAGNOSIS — N18.30 TYPE 2 DIABETES MELLITUS WITH STAGE 3 CHRONIC KIDNEY DISEASE, WITH LONG-TERM CURRENT USE OF INSULIN: ICD-10-CM

## 2017-11-08 DIAGNOSIS — G89.29 CHRONIC LEFT-SIDED LOW BACK PAIN WITHOUT SCIATICA: ICD-10-CM

## 2017-11-08 DIAGNOSIS — M54.50 CHRONIC LEFT-SIDED LOW BACK PAIN WITHOUT SCIATICA: ICD-10-CM

## 2017-11-08 PROCEDURE — 99499 UNLISTED E&M SERVICE: CPT | Mod: S$GLB,,, | Performed by: FAMILY MEDICINE

## 2017-11-08 PROCEDURE — 99214 OFFICE O/P EST MOD 30 MIN: CPT | Mod: S$GLB,,, | Performed by: FAMILY MEDICINE

## 2017-11-08 PROCEDURE — 99999 PR PBB SHADOW E&M-EST. PATIENT-LVL V: CPT | Mod: PBBFAC,,, | Performed by: FAMILY MEDICINE

## 2017-11-08 RX ORDER — MOMETASONE FUROATE 1 MG/G
CREAM TOPICAL
COMMUNITY
Start: 2017-09-20 | End: 2019-08-19

## 2017-11-08 RX ORDER — TIZANIDINE 4 MG/1
4 TABLET ORAL EVERY 6 HOURS PRN
Qty: 30 TABLET | Refills: 1 | Status: SHIPPED | OUTPATIENT
Start: 2017-11-08 | End: 2017-11-18

## 2017-11-08 RX ORDER — DEXTROSE 4 G
TABLET,CHEWABLE ORAL
Qty: 90 EACH | Refills: 1 | Status: SHIPPED | OUTPATIENT
Start: 2017-11-08 | End: 2023-01-26 | Stop reason: SDUPTHER

## 2017-11-08 RX ORDER — ZONISAMIDE 50 MG/1
CAPSULE ORAL
COMMUNITY
Start: 2017-10-31 | End: 2018-08-21

## 2017-11-08 RX ORDER — OXYCODONE AND ACETAMINOPHEN 10; 325 MG/1; MG/1
1 TABLET ORAL EVERY 6 HOURS PRN
COMMUNITY
Start: 2017-10-31 | End: 2018-10-19

## 2017-11-08 RX ORDER — PROPRANOLOL HYDROCHLORIDE 10 MG/1
TABLET ORAL
COMMUNITY
Start: 2017-09-05 | End: 2017-11-08

## 2017-11-08 RX ORDER — ZONISAMIDE 25 MG/1
CAPSULE ORAL
COMMUNITY
Start: 2017-09-05 | End: 2017-11-08

## 2017-11-08 RX ORDER — TIZANIDINE 2 MG/1
TABLET ORAL
COMMUNITY
Start: 2017-09-06 | End: 2017-11-08

## 2017-11-08 RX ORDER — LEVOFLOXACIN 750 MG/1
750 TABLET ORAL DAILY
Qty: 10 TABLET | Refills: 0 | Status: SHIPPED | OUTPATIENT
Start: 2017-11-08 | End: 2018-01-04 | Stop reason: ALTCHOICE

## 2017-11-08 RX ORDER — TRIAMCINOLONE ACETONIDE 1 MG/G
OINTMENT TOPICAL
COMMUNITY
Start: 2017-09-12 | End: 2018-08-08 | Stop reason: CLARIF

## 2017-11-08 NOTE — PROGRESS NOTES
Routine Office Visit    Patient Name: Yanni Kaiser    : 1970  MRN: [id number]    Subjective:  Yanni is a 47 y.o. female who presents today for:    1. Sinus infection  Patient is a 46 yo AAF who smokes and suffers with asthma.  She presents today with recurring sinus infection and itching skin.  Was seen by dermatology and told she needed to see an allergist.  She continues to smoke daily and take benadryl as we as use topical emolient for itching.  Patient is diabetic and states her blood sugars have been doing well on her current regimen.  Patient has been using her families glucometers to check as hers is broken.        2.  Right lower back pain  Patient is having recurring right lower back pain.  She states that the pain is like a spasm in the lower back.  It hurts to press on the area.  No changes in urinary habits.  No fevers or chills.  No blood seen in urine.    Past Medical History  Past Medical History:   Diagnosis Date    Allergy     Asthma     Diabetes mellitus, type 2     GERD (gastroesophageal reflux disease)     High cholesterol     Hypertension     Pseudotumor cerebri     Seizures     Sleep apnea     Type 2 diabetes mellitus        Past Surgical History  Past Surgical History:   Procedure Laterality Date     SECTION      CHOLECYSTECTOMY      SINUS SURGERY         Family History  Family History   Problem Relation Age of Onset    Cancer Mother     Hypertension Mother     Diabetes Mother     Stroke Father     Heart disease Father     COPD Father     Heart attack Father     Cancer Maternal Grandmother        Social History  Social History     Social History    Marital status:      Spouse name: N/A    Number of children: N/A    Years of education: N/A     Occupational History    Not on file.     Social History Main Topics    Smoking status: Current Every Day Smoker     Packs/day: 1.00     Years: 15.00     Types: Cigarettes     Last attempt to quit:  6/10/2015    Smokeless tobacco: Never Used    Alcohol use No    Drug use: No    Sexual activity: Yes     Partners: Male     Birth control/ protection: None     Other Topics Concern    Not on file     Social History Narrative    No narrative on file       Current Medications  Current Outpatient Prescriptions on File Prior to Visit   Medication Sig Dispense Refill    acetaZOLAMIDE (DIAMOX) 500 mg CpSR Take 500 mg by mouth 2 (two) times daily.      albuterol (ACCUNEB) 1.25 mg/3 mL Nebu Take 3 mLs (1.25 mg total) by nebulization every 6 (six) hours as needed. Rescue 100 mL 0    albuterol 90 mcg/actuation inhaler Inhale 2 puffs into the lungs every 6 (six) hours as needed for Wheezing. Rescue 18 g 0    albuterol-ipratropium 2.5mg-0.5mg/3mL (DUO-NEB) 0.5 mg-3 mg(2.5 mg base)/3 mL nebulizer solution Take 3 mLs by nebulization every 6 (six) hours as needed for Wheezing or Shortness of Breath. Rescue 100 mL 0    atorvastatin (LIPITOR) 20 MG tablet Take 1 tablet (20 mg total) by mouth once daily. 90 tablet 1    blood sugar diagnostic Strp 1 each by Misc.(Non-Drug; Combo Route) route 4 (four) times daily before meals and nightly. ACCU CHEK ELVIA PLUS METER 200 each 3    capsaicin 0.1 % Crea Apply 1 application topically 2 (two) times daily. 56.6 g 1    fluticasone (FLONASE) 50 mcg/actuation nasal spray 1 spray by Each Nare route daily as needed. 16 g 1    fluticasone-vilanterol (BREO ELLIPTA) 200-25 mcg/dose DsDv diskus inhaler Inhale 1 puff into the lungs once daily. 1 each 3    gabapentin (NEURONTIN) 300 MG capsule Take 2 capsules (600 mg total) by mouth every evening. 180 capsule 11    hydrOXYzine pamoate (VISTARIL) 50 MG Cap Take 2 capsules (100 mg total) by mouth every 8 (eight) hours as needed (itching). 90 capsule 0    lancets (ACCU-CHEK SOFTCLIX LANCETS) Misc 1 Device by Misc.(Non-Drug; Combo Route) route 4 (four) times daily. 200 each 3    lancets 25 gauge Misc 1 lancet by Misc.(Non-Drug; Combo  Route) route 4 (four) times daily before meals and nightly. 200 each 11    LINZESS 290 mcg Cap TK ONE CAPSULE PO D ON AN EMPTY STOMACH  5    losartan (COZAAR) 100 MG tablet Take 1 tablet (100 mg total) by mouth once daily. 90 tablet 3    metformin (GLUCOPHAGE-XR) 500 MG 24 hr tablet Take 2 tablets (1,000 mg total) by mouth 2 (two) times daily with meals. 360 tablet 0    metformin (GLUCOPHAGE-XR) 500 MG 24 hr tablet TAKE 1 TABLET(500 MG) BY MOUTH EVERY DAY 90 tablet 0    montelukast (SINGULAIR) 10 mg tablet   1    NOVOLOG FLEXPEN 100 unit/mL InPn pen ADM 15 UNITS SC TID B MEALS  11    pantoprazole (PROTONIX) 40 MG tablet once daily.   0    propranolol (INDERAL) 20 MG tablet Take 1 tablet (20 mg total) by mouth 2 (two) times daily. 60 tablet 0    quetiapine (SEROQUEL) 25 MG Tab Take 1 tablet (25 mg total) by mouth every evening. 30 tablet 0    TAZTIA  mg CpSR TK ONE CAPSULE PO D  2    topiramate (TOPAMAX) 100 MG tablet Take 3 tablets (300 mg total) by mouth 2 (two) times daily. Please start taking 2 tablet (200 mg) twice a day for the next week. Then increase to additional tablet in evenings (3 tablets at night and 2 in morning) for 1 week. Then increase to 3 tablets in morning and 3 tablets in evening. 90 tablet 11    venlafaxine (EFFEXOR-XR) 75 MG 24 hr capsule Take 75 mg by mouth once daily.      [DISCONTINUED] ACCU-CHEK ELVIA PLUS METER Misc TEST FOUR TIMES DAILY BEFORE MEALS AND EVERY NIGHT 90 each 1    hydrocortisone 2.5 % cream Apply topically 2 (two) times daily. 20 g 0    [DISCONTINUED] oxycodone-acetaminophen (PERCOCET) 7.5-325 mg per tablet Take 1 tablet by mouth every 8 (eight) hours as needed for Pain. 90 tablet 0     No current facility-administered medications on file prior to visit.        Allergies   Review of patient's allergies indicates:  No Known Allergies    Review of Systems (Pertinent positives)  Review of Systems   Constitutional: Negative.    HENT: Positive for congestion  "and sinus pain.    Eyes: Negative.    Respiratory: Negative.    Cardiovascular: Negative.    Gastrointestinal: Negative.    Genitourinary: Negative.    Musculoskeletal: Positive for back pain and myalgias.   Skin: Negative.          Pulse 79   Temp 97.9 °F (36.6 °C) (Oral)   Resp 14   Ht 5' 4" (1.626 m)   Wt (!) 156.5 kg (345 lb 0.3 oz)   SpO2 95%   BMI 59.22 kg/m²     GENERAL APPEARANCE: in no apparent distress and well developed and well nourished  HEENT: PERRL, EOMI, Sclera clear, anicteric, Oropharynx clear, no lesions, Neck supple with midline trachea  NECK: normal, supple, no adenopathy, thyroid normal in size  RESPIRATORY: appears well, vitals normal, no respiratory distress, acyanotic, normal RR, chest clear, no wheezing, crepitations, rhonchi, normal symmetric air entry  HEART: regular rate and rhythm, S1, S2 normal, no murmur, click, rub or gallop.    ABDOMEN: abdomen is soft without tenderness, no masses, no hernias, no organomegaly, no rebound, no guarding. Suprapubic tenderness absent. No CVA tenderness.  NEUROLOGIC: normal without focal findings, CN II-XII are intact.  r  SKIN: no rashes, no wounds, no other lesions  PSYCH: Alert, oriented x 3, thought content appropriate, speech normal, pleasant and cooperative, good eye contact, well groomed    Assessment/Plan:  Yanni Kaiser is a 47 y.o. female who presents today for :    Yanni was seen today for follow-up, sinusitis, back pain and chest pain.    Diagnoses and all orders for this visit:    Chronic maxillary sinusitis  -     Ambulatory referral/consult to Allergy  -     levoFLOXacin (LEVAQUIN) 750 MG tablet; Take 1 tablet (750 mg total) by mouth once daily.    Chronic itching papular eruption of axillae and pubes  -     Ambulatory referral/consult to Allergy    Chronic left-sided low back pain without sciatica  -     tiZANidine (ZANAFLEX) 4 MG tablet; Take 1 tablet (4 mg total) by mouth every 6 (six) hours as needed.    Type 2 " diabetes mellitus with stage 3 chronic kidney disease, with long-term current use of insulin  -     blood-glucose meter (ACCU-CHEK ELVIA PLUS METER) Misc; TEST FOUR TIMES DAILY BEFORE MEALS AND EVERY NIGHT      1.  Patient to take all medications as prescribed  2.  Refer to allergy for rash and recurring sinusitis  3.  Encouraged to quit smoking again  4.  Glucometer ordered  5.  She is to take zanaflex

## 2017-11-28 ENCOUNTER — HOSPITAL ENCOUNTER (EMERGENCY)
Facility: OTHER | Age: 47
Discharge: HOME | End: 2017-11-28
Attending: INTERNAL MEDICINE
Payer: MEDICARE

## 2017-11-28 VITALS
RESPIRATION RATE: 22 BRPM | DIASTOLIC BLOOD PRESSURE: 64 MMHG | OXYGEN SATURATION: 99 % | TEMPERATURE: 98 F | SYSTOLIC BLOOD PRESSURE: 141 MMHG | HEART RATE: 76 BPM

## 2017-11-28 DIAGNOSIS — J45.901 EXACERBATION OF ASTHMA, UNSPECIFIED ASTHMA SEVERITY, UNSPECIFIED WHETHER PERSISTENT: Primary | ICD-10-CM

## 2017-11-28 PROCEDURE — 63600175 PHARM REV CODE 636 W HCPCS: Performed by: INTERNAL MEDICINE

## 2017-11-28 PROCEDURE — 99283 EMERGENCY DEPT VISIT LOW MDM: CPT | Mod: 25

## 2017-11-28 PROCEDURE — 25000242 PHARM REV CODE 250 ALT 637 W/ HCPCS: Performed by: INTERNAL MEDICINE

## 2017-11-28 PROCEDURE — 94640 AIRWAY INHALATION TREATMENT: CPT

## 2017-11-28 PROCEDURE — 96372 THER/PROPH/DIAG INJ SC/IM: CPT

## 2017-11-28 RX ORDER — KETOROLAC TROMETHAMINE 30 MG/ML
60 INJECTION, SOLUTION INTRAMUSCULAR; INTRAVENOUS
Status: COMPLETED | OUTPATIENT
Start: 2017-11-28 | End: 2017-11-28

## 2017-11-28 RX ORDER — IPRATROPIUM BROMIDE 0.5 MG/2.5ML
0.5 SOLUTION RESPIRATORY (INHALATION) ONCE
Status: DISCONTINUED | OUTPATIENT
Start: 2017-11-28 | End: 2017-11-28

## 2017-11-28 RX ORDER — IPRATROPIUM BROMIDE 0.5 MG/2.5ML
0.5 SOLUTION RESPIRATORY (INHALATION) ONCE
Status: COMPLETED | OUTPATIENT
Start: 2017-11-28 | End: 2017-11-28

## 2017-11-28 RX ORDER — ALBUTEROL SULFATE 90 UG/1
2 AEROSOL, METERED RESPIRATORY (INHALATION) EVERY 6 HOURS PRN
Qty: 1 INHALER | Refills: 0 | Status: SHIPPED | OUTPATIENT
Start: 2017-11-28 | End: 2018-01-04 | Stop reason: SDUPTHER

## 2017-11-28 RX ORDER — PREDNISONE 20 MG/1
60 TABLET ORAL DAILY
Qty: 15 TABLET | Refills: 0 | Status: SHIPPED | OUTPATIENT
Start: 2017-11-28 | End: 2017-12-03

## 2017-11-28 RX ORDER — LEVALBUTEROL 1.25 MG/.5ML
1.25 SOLUTION, CONCENTRATE RESPIRATORY (INHALATION) ONCE
Status: DISCONTINUED | OUTPATIENT
Start: 2017-11-28 | End: 2017-11-28

## 2017-11-28 RX ORDER — PREDNISONE 20 MG/1
60 TABLET ORAL
Status: COMPLETED | OUTPATIENT
Start: 2017-11-28 | End: 2017-11-28

## 2017-11-28 RX ORDER — LEVALBUTEROL 1.25 MG/.5ML
1.25 SOLUTION, CONCENTRATE RESPIRATORY (INHALATION) ONCE
Status: COMPLETED | OUTPATIENT
Start: 2017-11-28 | End: 2017-11-28

## 2017-11-28 RX ADMIN — KETOROLAC TROMETHAMINE 60 MG: 60 INJECTION, SOLUTION INTRAMUSCULAR at 08:11

## 2017-11-28 RX ADMIN — LEVALBUTEROL 1.25 MG: 1.25 SOLUTION, CONCENTRATE RESPIRATORY (INHALATION) at 07:11

## 2017-11-28 RX ADMIN — IPRATROPIUM BROMIDE 0.5 MG: 0.5 SOLUTION RESPIRATORY (INHALATION) at 07:11

## 2017-11-28 RX ADMIN — PREDNISONE 60 MG: 20 TABLET ORAL at 07:11

## 2017-11-29 NOTE — ED PROVIDER NOTES
Encounter Date: 2017       History     Chief Complaint   Patient presents with    Shortness of Breath     47-year-old female presents to the emergency department with sudden onset of cough and wheezing.  Patient brought her  to the emergency Department secondary to chest pain and during triage of her , she began to cough and developed mild wheezes.      The history is provided by the patient. No  was used.   Shortness of Breath   This is a recurrent problem. The average episode lasts 10 minutes. The problem occurs continuously.The current episode started less than 1 hour ago. The problem has been gradually improving. Associated symptoms include cough and wheezing. Pertinent negatives include no fever, no chest pain and no leg swelling. It is unknown what precipitated the problem. She has tried nothing for the symptoms. She has had prior ED visits. Associated medical issues include asthma.     Review of patient's allergies indicates:  No Known Allergies  Past Medical History:   Diagnosis Date    Allergy     Asthma     Diabetes mellitus, type 2     GERD (gastroesophageal reflux disease)     High cholesterol     Hypertension     Pseudotumor cerebri     Seizures     Sleep apnea     Type 2 diabetes mellitus      Past Surgical History:   Procedure Laterality Date     SECTION      CHOLECYSTECTOMY      SINUS SURGERY       Family History   Problem Relation Age of Onset    Cancer Mother     Hypertension Mother     Diabetes Mother     Stroke Father     Heart disease Father     COPD Father     Heart attack Father     Cancer Maternal Grandmother      Social History   Substance Use Topics    Smoking status: Current Every Day Smoker     Packs/day: 1.00     Years: 15.00     Types: Cigarettes     Last attempt to quit: 6/10/2015    Smokeless tobacco: Never Used    Alcohol use No     Review of Systems   Constitutional: Negative for fever.   Respiratory: Positive  for cough, shortness of breath and wheezing. Negative for chest tightness.    Cardiovascular: Negative for chest pain, palpitations and leg swelling.   All other systems reviewed and are negative.      Physical Exam     Initial Vitals   BP Pulse Resp Temp SpO2   11/28/17 1935 11/28/17 1931 11/28/17 1931 11/28/17 1935 11/28/17 1935   (!) 140/88 78 20 98.2 °F (36.8 °C) 100 %      MAP       11/28/17 1935       105.33         Physical Exam    Nursing note and vitals reviewed.  Constitutional: She appears well-developed.   Obese   HENT:   Head: Normocephalic and atraumatic.   Right Ear: External ear normal.   Left Ear: External ear normal.   Nose: Nose normal.   Mouth/Throat: Oropharynx is clear and moist. No oropharyngeal exudate.   Eyes: EOM are normal.   Neck: Normal range of motion.   Cardiovascular: Normal rate and regular rhythm.   Pulmonary/Chest: She has wheezes (Occasional expiratory).   Abdominal: Soft. Bowel sounds are normal.   Musculoskeletal: Normal range of motion.   Neurological: She is alert and oriented to person, place, and time. She has normal strength.   Skin: Skin is warm.   Psychiatric: She has a normal mood and affect. Her behavior is normal. Thought content normal.         ED Course   Procedures  Labs Reviewed - No data to display          Medical Decision Making:   Initial Assessment:   47-year-old female presents to the emergency department with sudden onset of cough and wheezing.  Patient brought her  to the emergency Department secondary to chest pain and during triage of her , she began to cough and developed mild wheezes.    Differential Diagnosis:   Asthma exacerbation  Pneumonia  Bronchitis  ALLERGIC reaction  ED Management:  Patient was given albuterol/Atrovent nebulizer treatment as well as prednisone in the emergency department.  Symptoms resolved to discharge and oxygen sats remained  % on room air.  She was given a prescription for an albuterol HFA as well as a  prednisone burst.  She was advised to follow-up with her primary care physician tomorrow for reevaluation.                   ED Course      Clinical Impression:   The encounter diagnosis was Exacerbation of asthma, unspecified asthma severity, unspecified whether persistent.    Disposition:   Disposition: Discharged  Condition: Stable                        Eduardo Quezada MD  11/28/17 2040

## 2017-11-30 ENCOUNTER — OFFICE VISIT (OUTPATIENT)
Dept: FAMILY MEDICINE | Facility: CLINIC | Age: 47
End: 2017-11-30
Payer: MEDICARE

## 2017-11-30 VITALS
TEMPERATURE: 98 F | DIASTOLIC BLOOD PRESSURE: 82 MMHG | SYSTOLIC BLOOD PRESSURE: 120 MMHG | BODY MASS INDEX: 50.02 KG/M2 | RESPIRATION RATE: 16 BRPM | HEART RATE: 72 BPM | HEIGHT: 64 IN | OXYGEN SATURATION: 98 % | WEIGHT: 293 LBS

## 2017-11-30 DIAGNOSIS — G44.221 CHRONIC TENSION-TYPE HEADACHE, INTRACTABLE: Primary | ICD-10-CM

## 2017-11-30 PROCEDURE — 96372 THER/PROPH/DIAG INJ SC/IM: CPT | Mod: S$GLB,,, | Performed by: FAMILY MEDICINE

## 2017-11-30 PROCEDURE — 99999 PR PBB SHADOW E&M-EST. PATIENT-LVL IV: CPT | Mod: PBBFAC,,, | Performed by: FAMILY MEDICINE

## 2017-11-30 PROCEDURE — 99214 OFFICE O/P EST MOD 30 MIN: CPT | Mod: 25,S$GLB,, | Performed by: FAMILY MEDICINE

## 2017-11-30 RX ORDER — DIPHENHYDRAMINE HYDROCHLORIDE 50 MG/ML
25 INJECTION INTRAMUSCULAR; INTRAVENOUS
Status: COMPLETED | OUTPATIENT
Start: 2017-11-30 | End: 2017-11-30

## 2017-11-30 RX ORDER — KETOROLAC TROMETHAMINE 30 MG/ML
30 INJECTION, SOLUTION INTRAMUSCULAR; INTRAVENOUS
Status: COMPLETED | OUTPATIENT
Start: 2017-11-30 | End: 2017-11-30

## 2017-11-30 RX ORDER — OMEPRAZOLE 20 MG/1
CAPSULE, DELAYED RELEASE ORAL
COMMUNITY
Start: 2017-11-08 | End: 2018-01-22

## 2017-11-30 RX ADMIN — KETOROLAC TROMETHAMINE 30 MG: 30 INJECTION, SOLUTION INTRAMUSCULAR; INTRAVENOUS at 10:11

## 2017-11-30 RX ADMIN — DIPHENHYDRAMINE HYDROCHLORIDE 25 MG: 50 INJECTION INTRAMUSCULAR; INTRAVENOUS at 10:11

## 2017-11-30 NOTE — PROGRESS NOTES
Dephenhydrmine and Ketorolac 30mg.....was given to the pt as ordered by the MD. The patient tolerated well, I advised the patient to wait 15 minuets to observe for any vaccine reactions. The pt. Expressed an understanding.

## 2017-11-30 NOTE — PROGRESS NOTES
Routine Office Visit    Patient Name: Yanni Kaiser    : 1970  MRN: [id number]    Subjective:  Yanni is a 47 y.o. female who presents today for:    1. Headache  Patient presenting today with recurring headaches x 1 month.  She alvarez suffer with ICH and is followed by Dr. Hays (per patient).  There has been no blurred vision, weakness, or numbness.  She states that she has been taking excedrin migraine for her headache that has helped.  She has been hospitalized for seizures and for her ICH in the past.  No worsening of headaches recently.  She does also take percocet as needed for chronic foot pain.  She has not notified her neurologist of these recurring headaches in the past month.     Past Medical History  Past Medical History:   Diagnosis Date    Allergy     Asthma     Diabetes mellitus, type 2     GERD (gastroesophageal reflux disease)     High cholesterol     Hypertension     Pseudotumor cerebri     Seizures     Sleep apnea     Type 2 diabetes mellitus        Past Surgical History  Past Surgical History:   Procedure Laterality Date     SECTION      CHOLECYSTECTOMY      SINUS SURGERY         Family History  Family History   Problem Relation Age of Onset    Cancer Mother     Hypertension Mother     Diabetes Mother     Stroke Father     Heart disease Father     COPD Father     Heart attack Father     Cancer Maternal Grandmother        Social History  Social History     Social History    Marital status:      Spouse name: N/A    Number of children: N/A    Years of education: N/A     Occupational History    Not on file.     Social History Main Topics    Smoking status: Current Every Day Smoker     Packs/day: 1.00     Years: 15.00     Types: Cigarettes     Last attempt to quit: 6/10/2015    Smokeless tobacco: Never Used    Alcohol use No    Drug use: No    Sexual activity: Yes     Partners: Male     Birth control/ protection: None     Other Topics Concern     Not on file     Social History Narrative    No narrative on file       Current Medications  Current Outpatient Prescriptions on File Prior to Visit   Medication Sig Dispense Refill    acetaZOLAMIDE (DIAMOX) 500 mg CpSR Take 500 mg by mouth 2 (two) times daily.      albuterol 90 mcg/actuation inhaler Inhale 2 puffs into the lungs every 6 (six) hours as needed for Wheezing. Rescue 1 Inhaler 0    albuterol-ipratropium 2.5mg-0.5mg/3mL (DUO-NEB) 0.5 mg-3 mg(2.5 mg base)/3 mL nebulizer solution Take 3 mLs by nebulization every 6 (six) hours as needed for Wheezing or Shortness of Breath. Rescue 100 mL 0    atorvastatin (LIPITOR) 20 MG tablet Take 1 tablet (20 mg total) by mouth once daily. 90 tablet 1    blood sugar diagnostic Strp 1 each by Misc.(Non-Drug; Combo Route) route 4 (four) times daily before meals and nightly. ACCU CHEK ELVIA PLUS METER 200 each 3    blood-glucose meter (ACCU-CHEK ELVIA PLUS METER) Misc TEST FOUR TIMES DAILY BEFORE MEALS AND EVERY NIGHT 90 each 1    capsaicin 0.1 % Crea Apply 1 application topically 2 (two) times daily. 56.6 g 1    fluticasone (FLONASE) 50 mcg/actuation nasal spray 1 spray by Each Nare route daily as needed. 16 g 1    fluticasone-vilanterol (BREO ELLIPTA) 200-25 mcg/dose DsDv diskus inhaler Inhale 1 puff into the lungs once daily. 1 each 3    gabapentin (NEURONTIN) 300 MG capsule Take 2 capsules (600 mg total) by mouth every evening. 180 capsule 11    hydrOXYzine pamoate (VISTARIL) 50 MG Cap Take 2 capsules (100 mg total) by mouth every 8 (eight) hours as needed (itching). 90 capsule 0    lancets (ACCU-CHEK SOFTCLIX LANCETS) Misc 1 Device by Misc.(Non-Drug; Combo Route) route 4 (four) times daily. 200 each 3    lancets 25 gauge Misc 1 lancet by Misc.(Non-Drug; Combo Route) route 4 (four) times daily before meals and nightly. 200 each 11    levoFLOXacin (LEVAQUIN) 750 MG tablet Take 1 tablet (750 mg total) by mouth once daily. 10 tablet 0    LINZESS 290 mcg  Cap TK ONE CAPSULE PO D ON AN EMPTY STOMACH  5    losartan (COZAAR) 100 MG tablet Take 1 tablet (100 mg total) by mouth once daily. 90 tablet 3    metformin (GLUCOPHAGE-XR) 500 MG 24 hr tablet Take 2 tablets (1,000 mg total) by mouth 2 (two) times daily with meals. 360 tablet 0    metformin (GLUCOPHAGE-XR) 500 MG 24 hr tablet TAKE 1 TABLET(500 MG) BY MOUTH EVERY DAY 90 tablet 0    mometasone 0.1% (ELOCON) 0.1 % cream       montelukast (SINGULAIR) 10 mg tablet   1    NOVOLOG FLEXPEN 100 unit/mL InPn pen ADM 15 UNITS SC TID B MEALS  11    oxyCODONE-acetaminophen (PERCOCET)  mg per tablet       predniSONE (DELTASONE) 20 MG tablet Take 3 tablets (60 mg total) by mouth once daily. 15 tablet 0    propranolol (INDERAL) 20 MG tablet Take 1 tablet (20 mg total) by mouth 2 (two) times daily. 60 tablet 0    quetiapine (SEROQUEL) 25 MG Tab Take 1 tablet (25 mg total) by mouth every evening. 30 tablet 0    TAZTIA  mg CpSR TK ONE CAPSULE PO D  2    topiramate (TOPAMAX) 100 MG tablet Take 3 tablets (300 mg total) by mouth 2 (two) times daily. Please start taking 2 tablet (200 mg) twice a day for the next week. Then increase to additional tablet in evenings (3 tablets at night and 2 in morning) for 1 week. Then increase to 3 tablets in morning and 3 tablets in evening. 90 tablet 11    triamcinolone acetonide 0.1% (KENALOG) 0.1 % ointment       venlafaxine (EFFEXOR-XR) 75 MG 24 hr capsule Take 75 mg by mouth once daily.      zonisamide (ZONEGRAN) 50 MG Cap       hydrocortisone 2.5 % cream Apply topically 2 (two) times daily. 20 g 0    [DISCONTINUED] pantoprazole (PROTONIX) 40 MG tablet once daily.   0     No current facility-administered medications on file prior to visit.        Allergies   Review of patient's allergies indicates:  No Known Allergies    Review of Systems (Pertinent positives)  Review of Systems   Constitutional: Negative.    HENT: Negative.    Eyes: Positive for photophobia.  "  Respiratory: Negative.    Cardiovascular: Negative.    Gastrointestinal: Negative.    Musculoskeletal: Negative.    Skin: Negative.    Neurological: Positive for headaches.         /82 (BP Location: Right arm, Patient Position: Sitting, BP Method: Large (Manual))   Pulse 72   Temp 98.3 °F (36.8 °C) (Oral)   Resp 16   Ht 5' 4" (1.626 m)   Wt (!) 157.1 kg (346 lb 5.5 oz)   SpO2 98%   BMI 59.45 kg/m²     GENERAL APPEARANCE: in no apparent distress and well developed and well nourished  HEENT: PERRLA, EOMI, Sclera clear, anicteric, Oropharynx clear, no lesions, Neck supple with midline trachea  NECK: normal, supple, no adenopathy, thyroid normal in size  RESPIRATORY: appears well, vitals normal, no respiratory distress, acyanotic, normal RR, chest clear, no wheezing, crepitations, rhonchi, normal symmetric air entry  HEART: regular rate and rhythm, S1, S2 normal, no murmur, click, rub or gallop.    ABDOMEN: abdomen is soft without tenderness, no masses, no hernias, no organomegaly, no rebound, no guarding. Suprapubic tenderness absent. No CVA tenderness.  NEUROLOGIC: normal without focal findings, CN II-XII are intact.   SKIN: no rashes, no wounds, no other lesions  PSYCH: Alert, oriented x 3, thought content appropriate, speech normal, pleasant and cooperative, good eye contact, well groomed.    Assessment/Plan:  Yanni Kaiser is a 47 y.o. female who presents today for :    Yanni was seen today for headache and hospital followup.    Diagnoses and all orders for this visit:    Chronic tension-type headache, intractable  -     ketorolac injection 30 mg; Inject 30 mg into the muscle one time.  -     diphenhydrAMINE injection 25 mg; Inject 0.5 mLs (25 mg total) into the muscle one time.  -     Ambulatory referral to Neurology      1.  toradol and benadryl for the headache  2.  Will refer to Dr. Ochoa for evaluation of headache  3.  She is to call with any concerns  4.  She is to notify her current " neurologist with these symptoms      Carlyle Gordillo MD

## 2017-12-19 DIAGNOSIS — G89.29 CHRONIC NONINTRACTABLE HEADACHE, UNSPECIFIED HEADACHE TYPE: ICD-10-CM

## 2017-12-19 DIAGNOSIS — R51.9 CHRONIC NONINTRACTABLE HEADACHE, UNSPECIFIED HEADACHE TYPE: ICD-10-CM

## 2017-12-19 DIAGNOSIS — G93.2 IIH (IDIOPATHIC INTRACRANIAL HYPERTENSION): ICD-10-CM

## 2017-12-19 RX ORDER — VENLAFAXINE HYDROCHLORIDE 75 MG/1
CAPSULE, EXTENDED RELEASE ORAL
Qty: 90 CAPSULE | Refills: 0 | Status: SHIPPED | OUTPATIENT
Start: 2017-12-19 | End: 2018-06-21 | Stop reason: SDUPTHER

## 2017-12-19 RX ORDER — VENLAFAXINE HYDROCHLORIDE 75 MG/1
75 CAPSULE, EXTENDED RELEASE ORAL DAILY
Qty: 90 CAPSULE | Refills: 3 | Status: SHIPPED | OUTPATIENT
Start: 2017-12-19 | End: 2018-01-22 | Stop reason: SDUPTHER

## 2017-12-20 ENCOUNTER — OFFICE VISIT (OUTPATIENT)
Dept: FAMILY MEDICINE | Facility: CLINIC | Age: 47
End: 2017-12-20
Payer: MEDICARE

## 2017-12-20 VITALS
HEIGHT: 64 IN | WEIGHT: 293 LBS | RESPIRATION RATE: 36 BRPM | DIASTOLIC BLOOD PRESSURE: 80 MMHG | SYSTOLIC BLOOD PRESSURE: 136 MMHG | OXYGEN SATURATION: 98 % | BODY MASS INDEX: 50.02 KG/M2 | HEART RATE: 84 BPM | TEMPERATURE: 99 F

## 2017-12-20 DIAGNOSIS — E11.22 TYPE 2 DIABETES MELLITUS WITH STAGE 3 CHRONIC KIDNEY DISEASE, WITH LONG-TERM CURRENT USE OF INSULIN: ICD-10-CM

## 2017-12-20 DIAGNOSIS — J45.901 ACUTE EXACERBATION OF COPD WITH ASTHMA: Primary | ICD-10-CM

## 2017-12-20 DIAGNOSIS — J44.1 ACUTE EXACERBATION OF COPD WITH ASTHMA: Primary | ICD-10-CM

## 2017-12-20 DIAGNOSIS — Z79.4 TYPE 2 DIABETES MELLITUS WITH STAGE 3 CHRONIC KIDNEY DISEASE, WITH LONG-TERM CURRENT USE OF INSULIN: ICD-10-CM

## 2017-12-20 DIAGNOSIS — N18.30 TYPE 2 DIABETES MELLITUS WITH STAGE 3 CHRONIC KIDNEY DISEASE, WITH LONG-TERM CURRENT USE OF INSULIN: ICD-10-CM

## 2017-12-20 DIAGNOSIS — R51.9 SINUS HEADACHE: ICD-10-CM

## 2017-12-20 LAB — GLUCOSE SERPL-MCNC: 129 MG/DL (ref 70–110)

## 2017-12-20 PROCEDURE — 82948 REAGENT STRIP/BLOOD GLUCOSE: CPT | Mod: S$GLB,,, | Performed by: FAMILY MEDICINE

## 2017-12-20 PROCEDURE — 99999 PR PBB SHADOW E&M-EST. PATIENT-LVL IV: CPT | Mod: PBBFAC,,, | Performed by: FAMILY MEDICINE

## 2017-12-20 PROCEDURE — 94640 AIRWAY INHALATION TREATMENT: CPT | Mod: S$GLB,,, | Performed by: FAMILY MEDICINE

## 2017-12-20 PROCEDURE — 99214 OFFICE O/P EST MOD 30 MIN: CPT | Mod: 25,S$GLB,, | Performed by: FAMILY MEDICINE

## 2017-12-20 PROCEDURE — 96372 THER/PROPH/DIAG INJ SC/IM: CPT | Mod: 59,S$GLB,, | Performed by: FAMILY MEDICINE

## 2017-12-20 PROCEDURE — 99499 UNLISTED E&M SERVICE: CPT | Mod: S$PBB,,, | Performed by: FAMILY MEDICINE

## 2017-12-20 RX ORDER — TIZANIDINE 4 MG/1
4 TABLET ORAL EVERY 8 HOURS
COMMUNITY
Start: 2017-12-10 | End: 2019-08-19

## 2017-12-20 RX ORDER — IBUPROFEN 600 MG/1
600 TABLET ORAL
Status: COMPLETED | OUTPATIENT
Start: 2017-12-20 | End: 2017-12-20

## 2017-12-20 RX ORDER — AZITHROMYCIN 250 MG/1
TABLET, FILM COATED ORAL
Qty: 6 TABLET | Refills: 0 | Status: SHIPPED | OUTPATIENT
Start: 2017-12-20 | End: 2017-12-25

## 2017-12-20 RX ORDER — CEFTRIAXONE 1 G/1
1 INJECTION, POWDER, FOR SOLUTION INTRAMUSCULAR; INTRAVENOUS
Status: COMPLETED | OUTPATIENT
Start: 2017-12-20 | End: 2017-12-20

## 2017-12-20 RX ORDER — ALBUTEROL SULFATE 1.25 MG/3ML
1.25 SOLUTION RESPIRATORY (INHALATION)
Status: COMPLETED | OUTPATIENT
Start: 2017-12-20 | End: 2017-12-20

## 2017-12-20 RX ADMIN — CEFTRIAXONE 1 G: 1 INJECTION, POWDER, FOR SOLUTION INTRAMUSCULAR; INTRAVENOUS at 04:12

## 2017-12-20 RX ADMIN — IBUPROFEN 600 MG: 600 TABLET ORAL at 04:12

## 2017-12-20 RX ADMIN — ALBUTEROL SULFATE 1.25 MG: 1.25 SOLUTION RESPIRATORY (INHALATION) at 04:12

## 2017-12-20 NOTE — PROGRESS NOTES
Albuterol nebulizer treatment was given to the pt. As directed by the md. The pt. Tolerated well.   Ceftriazone 1gm was administered to the pt. As ordered by the provider. The pt. Was notified to wait 15 min before leaving the office observe for a vaccine reaction. The pt. Expressed an understanding.   600 mg of ibuprofen was given to the pt. Po. The pt. Tolerated well.

## 2017-12-21 NOTE — PROGRESS NOTES
Routine Office Visit    Patient Name: Yanni Kaiser    : 1970  MRN: [id number]    Subjective:  Yanni is a 47 y.o. female who presents today for:    1. Cough and shortness of breath  Patient presenting today with shortness of breath and productive cough for over a week.  She has been using her inhalers and nebulizers as directed per patient.  She tried to get seen by me last week, but was unable to due to my schedule being full.  She states that she was told to go to a provider at our [facility] clinic, but decided to go home instead.  She states that she was going to go to the ED had her symptoms worsened.  She has been diagnosed with asthma in the past, but does continue to smoke.  There has been no hemoptysis or cough induced emesis.  Patient states that she last smoked yesterday.      Past Medical History  Past Medical History:   Diagnosis Date    Allergy     Asthma     Diabetes mellitus, type 2     GERD (gastroesophageal reflux disease)     High cholesterol     Hypertension     Pseudotumor cerebri     Seizures     Sleep apnea     Type 2 diabetes mellitus        Past Surgical History  Past Surgical History:   Procedure Laterality Date     SECTION      CHOLECYSTECTOMY      SINUS SURGERY         Family History  Family History   Problem Relation Age of Onset    Cancer Mother     Hypertension Mother     Diabetes Mother     Stroke Father     Heart disease Father     COPD Father     Heart attack Father     Cancer Maternal Grandmother        Social History  Social History     Social History    Marital status:      Spouse name: N/A    Number of children: N/A    Years of education: N/A     Occupational History    Not on file.     Social History Main Topics    Smoking status: Current Every Day Smoker     Packs/day: 1.00     Years: 15.00     Types: Cigarettes     Last attempt to quit: 6/10/2015    Smokeless tobacco: Never Used    Alcohol use No    Drug use: No     Sexual activity: Yes     Partners: Male     Birth control/ protection: None     Other Topics Concern    Not on file     Social History Narrative    No narrative on file       Current Medications  Current Outpatient Prescriptions on File Prior to Visit   Medication Sig Dispense Refill    acetaZOLAMIDE (DIAMOX) 500 mg CpSR Take 500 mg by mouth 2 (two) times daily.      albuterol 90 mcg/actuation inhaler Inhale 2 puffs into the lungs every 6 (six) hours as needed for Wheezing. Rescue 1 Inhaler 0    albuterol-ipratropium 2.5mg-0.5mg/3mL (DUO-NEB) 0.5 mg-3 mg(2.5 mg base)/3 mL nebulizer solution Take 3 mLs by nebulization every 6 (six) hours as needed for Wheezing or Shortness of Breath. Rescue 100 mL 0    atorvastatin (LIPITOR) 20 MG tablet Take 1 tablet (20 mg total) by mouth once daily. 90 tablet 1    blood sugar diagnostic Strp 1 each by Misc.(Non-Drug; Combo Route) route 4 (four) times daily before meals and nightly. ACCU CHEK ELVIA PLUS METER 200 each 3    blood-glucose meter (ACCU-CHEK ELVIA PLUS METER) Misc TEST FOUR TIMES DAILY BEFORE MEALS AND EVERY NIGHT 90 each 1    capsaicin 0.1 % Crea Apply 1 application topically 2 (two) times daily. 56.6 g 1    fluticasone (FLONASE) 50 mcg/actuation nasal spray 1 spray by Each Nare route daily as needed. 16 g 1    fluticasone-vilanterol (BREO ELLIPTA) 200-25 mcg/dose DsDv diskus inhaler Inhale 1 puff into the lungs once daily. 1 each 3    gabapentin (NEURONTIN) 300 MG capsule Take 2 capsules (600 mg total) by mouth every evening. 180 capsule 11    hydrOXYzine pamoate (VISTARIL) 50 MG Cap Take 2 capsules (100 mg total) by mouth every 8 (eight) hours as needed (itching). 90 capsule 0    lancets (ACCU-CHEK SOFTCLIX LANCETS) Misc 1 Device by Misc.(Non-Drug; Combo Route) route 4 (four) times daily. 200 each 3    lancets 25 gauge Misc 1 lancet by Misc.(Non-Drug; Combo Route) route 4 (four) times daily before meals and nightly. 200 each 11    levoFLOXacin  (LEVAQUIN) 750 MG tablet Take 1 tablet (750 mg total) by mouth once daily. 10 tablet 0    LINZESS 290 mcg Cap TK ONE CAPSULE PO D ON AN EMPTY STOMACH  5    losartan (COZAAR) 100 MG tablet Take 1 tablet (100 mg total) by mouth once daily. 90 tablet 3    metformin (GLUCOPHAGE-XR) 500 MG 24 hr tablet Take 2 tablets (1,000 mg total) by mouth 2 (two) times daily with meals. 360 tablet 0    metformin (GLUCOPHAGE-XR) 500 MG 24 hr tablet TAKE 1 TABLET(500 MG) BY MOUTH EVERY DAY 90 tablet 0    mometasone 0.1% (ELOCON) 0.1 % cream       montelukast (SINGULAIR) 10 mg tablet   1    NOVOLOG FLEXPEN 100 unit/mL InPn pen ADM 15 UNITS SC TID B MEALS  11    omeprazole (PRILOSEC) 20 MG capsule       oxyCODONE-acetaminophen (PERCOCET)  mg per tablet       propranolol (INDERAL) 20 MG tablet Take 1 tablet (20 mg total) by mouth 2 (two) times daily. 60 tablet 0    quetiapine (SEROQUEL) 25 MG Tab Take 1 tablet (25 mg total) by mouth every evening. 30 tablet 0    TAZTIA  mg CpSR TK ONE CAPSULE PO D  2    topiramate (TOPAMAX) 100 MG tablet Take 3 tablets (300 mg total) by mouth 2 (two) times daily. Please start taking 2 tablet (200 mg) twice a day for the next week. Then increase to additional tablet in evenings (3 tablets at night and 2 in morning) for 1 week. Then increase to 3 tablets in morning and 3 tablets in evening. 90 tablet 11    triamcinolone acetonide 0.1% (KENALOG) 0.1 % ointment       venlafaxine (EFFEXOR-XR) 75 MG 24 hr capsule TAKE ONE CAPSULE BY MOUTH EVERY EVENING 90 capsule 0    venlafaxine (EFFEXOR-XR) 75 MG 24 hr capsule Take 1 capsule (75 mg total) by mouth once daily. 90 capsule 3    zonisamide (ZONEGRAN) 50 MG Cap       hydrocortisone 2.5 % cream Apply topically 2 (two) times daily. 20 g 0     No current facility-administered medications on file prior to visit.        Allergies   Review of patient's allergies indicates:  No Known Allergies    Review of Systems (Pertinent  "positives)  Review of Systems   Constitutional: Negative.    HENT: Positive for congestion.    Eyes: Negative.    Respiratory: Positive for cough, sputum production, shortness of breath and wheezing. Negative for hemoptysis.    Cardiovascular: Negative.    Gastrointestinal: Negative.    Genitourinary: Negative.    Musculoskeletal: Negative.          /80 (BP Location: Right arm, Patient Position: Sitting, BP Method: Large (Manual))   Pulse 84   Temp 98.6 °F (37 °C) (Oral)   Resp (!) 36   Ht 5' 4" (1.626 m)   Wt (!) 155.5 kg (342 lb 13 oz)   SpO2 98%   BMI 58.84 kg/m²     GENERAL APPEARANCE: in no apparent distress and well developed and well nourished  HEENT: PERRL, EOMI, Sclera clear, anicteric, Oropharynx clear, no lesions, Neck supple with midline trachea  NECK: normal, supple, no adenopathy, thyroid normal in size  RESPIRATORY: appears well, vitals normal, no respiratory distress, acyanotic, normal RR, mild wheezing throughout lung fields.  No retractions  HEART: regular rate and rhythm, S1, S2 normal, no murmur, click, rub or gallop.    ABDOMEN: abdomen is soft without tenderness, no masses, no hernias, no organomegaly, no rebound, no guarding. Suprapubic tenderness absent. No CVA tenderness.  SKIN: no rashes, no wounds, no other lesions  PSYCH: Alert, oriented x 3, thought content appropriate, speech normal, pleasant and cooperative, good eye contact, well groomed    Assessment/Plan:  Yanni Kaiser is a 47 y.o. female who presents today for :    Yanni was seen today for cough.    Diagnoses and all orders for this visit:    Acute exacerbation of COPD with asthma  -     azithromycin (Z-RHODA) 250 MG tablet; Take 2 tablets by mouth on day 1; Take 1 tablet by mouth on days 2-5  -     Ambulatory referral to Pulmonology  -     albuterol nebulizer solution 1.25 mg; Take 3 mLs (1.25 mg total) by nebulization one time.  -     cefTRIAXone injection 1 g; Inject 1 g into the muscle one time.    Type 2 " diabetes mellitus with stage 3 chronic kidney disease, with long-term current use of insulin  -     POCT Glucose    Sinus headache  -     ibuprofen tablet 600 mg; Take 1 tablet (600 mg total) by mouth one time.    1.  Patient had multiple coughing fits while this MD was present in the room, but none after I exited  2.  Due to previous admissions will treat at an exacerbation and educated on the need to quit smoking  3.  Refer to pulm as patient is taking inhalers, but I feel she is not taking the correctly  4.  Glucose <130mg/dl today in the office  5.  Patient complained of a headache that is likely from such forceful coughing  6.  Patient to follow up as needed    Carlyle Gordillo MD

## 2018-01-04 ENCOUNTER — LAB VISIT (OUTPATIENT)
Dept: LAB | Facility: HOSPITAL | Age: 48
End: 2018-01-04
Attending: STUDENT IN AN ORGANIZED HEALTH CARE EDUCATION/TRAINING PROGRAM
Payer: MEDICARE

## 2018-01-04 ENCOUNTER — OFFICE VISIT (OUTPATIENT)
Dept: ALLERGY | Facility: CLINIC | Age: 48
End: 2018-01-04
Payer: MEDICARE

## 2018-01-04 VITALS
BODY MASS INDEX: 50.02 KG/M2 | WEIGHT: 293 LBS | DIASTOLIC BLOOD PRESSURE: 72 MMHG | HEIGHT: 64 IN | HEART RATE: 72 BPM | OXYGEN SATURATION: 99 % | SYSTOLIC BLOOD PRESSURE: 140 MMHG

## 2018-01-04 DIAGNOSIS — J31.0 RHINITIS, UNSPECIFIED CHRONICITY, UNSPECIFIED TYPE: ICD-10-CM

## 2018-01-04 DIAGNOSIS — J45.41 MODERATE PERSISTENT ASTHMA WITH EXACERBATION: ICD-10-CM

## 2018-01-04 DIAGNOSIS — L29.9 ITCHING: ICD-10-CM

## 2018-01-04 DIAGNOSIS — L50.3 DERMATOGRAPHISM: ICD-10-CM

## 2018-01-04 DIAGNOSIS — Z72.0 TOBACCO ABUSE: ICD-10-CM

## 2018-01-04 DIAGNOSIS — J45.41 MODERATE PERSISTENT ASTHMA WITH ACUTE EXACERBATION: ICD-10-CM

## 2018-01-04 PROCEDURE — 99204 OFFICE O/P NEW MOD 45 MIN: CPT | Mod: S$GLB,,, | Performed by: STUDENT IN AN ORGANIZED HEALTH CARE EDUCATION/TRAINING PROGRAM

## 2018-01-04 PROCEDURE — 86003 ALLG SPEC IGE CRUDE XTRC EA: CPT

## 2018-01-04 PROCEDURE — 82785 ASSAY OF IGE: CPT

## 2018-01-04 PROCEDURE — 36415 COLL VENOUS BLD VENIPUNCTURE: CPT

## 2018-01-04 PROCEDURE — 99499 UNLISTED E&M SERVICE: CPT | Mod: S$GLB,,, | Performed by: STUDENT IN AN ORGANIZED HEALTH CARE EDUCATION/TRAINING PROGRAM

## 2018-01-04 PROCEDURE — 99999 PR PBB SHADOW E&M-EST. PATIENT-LVL IV: CPT | Mod: PBBFAC,,, | Performed by: STUDENT IN AN ORGANIZED HEALTH CARE EDUCATION/TRAINING PROGRAM

## 2018-01-04 PROCEDURE — 86003 ALLG SPEC IGE CRUDE XTRC EA: CPT | Mod: 59

## 2018-01-04 RX ORDER — CETIRIZINE HYDROCHLORIDE 10 MG/1
10 TABLET ORAL 2 TIMES DAILY
Qty: 60 TABLET | Refills: 3 | Status: SHIPPED | OUTPATIENT
Start: 2018-01-04 | End: 2018-12-18 | Stop reason: ALTCHOICE

## 2018-01-04 RX ORDER — MOMETASONE FUROATE 50 UG/1
2 SPRAY, METERED NASAL DAILY
Qty: 1 EACH | Refills: 11 | Status: SHIPPED | OUTPATIENT
Start: 2018-01-04 | End: 2018-10-19

## 2018-01-04 RX ORDER — IPRATROPIUM BROMIDE AND ALBUTEROL SULFATE 2.5; .5 MG/3ML; MG/3ML
3 SOLUTION RESPIRATORY (INHALATION) EVERY 6 HOURS PRN
Qty: 100 ML | Refills: 0 | Status: ON HOLD | OUTPATIENT
Start: 2018-01-04 | End: 2018-09-06 | Stop reason: SDUPTHER

## 2018-01-04 RX ORDER — PANTOPRAZOLE SODIUM 40 MG/1
40 TABLET, DELAYED RELEASE ORAL DAILY
Status: ON HOLD | COMMUNITY
End: 2018-09-06 | Stop reason: HOSPADM

## 2018-01-04 RX ORDER — ALBUTEROL SULFATE 90 UG/1
2 AEROSOL, METERED RESPIRATORY (INHALATION) EVERY 6 HOURS PRN
Qty: 1 INHALER | Refills: 0 | Status: SHIPPED | OUTPATIENT
Start: 2018-01-04 | End: 2018-09-10 | Stop reason: SDUPTHER

## 2018-01-04 RX ORDER — BUDESONIDE AND FORMOTEROL FUMARATE DIHYDRATE 160; 4.5 UG/1; UG/1
2 AEROSOL RESPIRATORY (INHALATION) EVERY 12 HOURS
Qty: 1 INHALER | Refills: 5 | Status: SHIPPED | OUTPATIENT
Start: 2018-01-04 | End: 2018-09-10 | Stop reason: SDUPTHER

## 2018-01-04 NOTE — PROGRESS NOTES
Allergy Clinic Note  [facility]    Subjective:      Patient ID: Yanni Kaiser is a 47 y.o. female.    Chief Complaint: Itching and Nasal Congestion      Referring Provider: Self, Aaareferral    History of Present Illness: 48 yo female with asthma is here following an E.D. visit for same 11/28/2017.  She is accompanied by her  and is a fair historian    Patient's chief complaint is diffuse itching for 6-12 months.  The itching is sometimes followed by redness of the skin or raised linear welts.  Itching is only partially controlled by high doses of Benadryl, 75 mg at a time.    She also reports hyperpigmented skin in areas of previous scratching, EKG leads, or IV sites. These dark areas can last anywhere from several months to several years.    Workup has included dermatology evaluation including skin scraping for scabies and patch testing, both reportedly normal.    With respect to her asthma she reports current asthma symptoms 2-3 times a week with nighttime awakenings approximately twice a week.  She states she is taking Breo, Singulair and Advair as well as albuterol MDI and albuterol ipratropium by nebulizer.      She also reports rhinitis symptoms manifested by nasal congestion, itchy nose, itchy eyes, and itchy ears.  She has taken fluticasone in the past with partial benefit at unknown dose.  She is on no nasal sprays at present.    Additional History:   Past medical history is significant for  psuedotumor cerebri, HTN, DM, and seizure disorder. She is s/p sinus surgery.  Family history is significant for diabetes, hypertension, COPD, and heart attack.  Client reports  reports that she has been smoking Cigarettes.  She has a 15.00 pack-year smoking history. She has never used smokeless tobacco.  She is , disabled, and lives in [place].    Patient Active Problem List   Diagnosis    Body mass index 60.0-69.9, adult    Mild intermittent asthma    SHARATH (obstructive sleep  apnea)    Leg varices    Low back pain    Seizure disorder    Type 2 diabetes mellitus, controlled    Migraine    Morbid obesity with BMI of 60.0-69.9, adult    Tobacco abuse    Exacerbation of asthma    Anemia of chronic disease    Mild protein malnutrition    Weakness    Allergic rhinitis    Idiopathic intracranial hypertension    Essential hypertension    Combined hyperlipidemia associated with type 2 diabetes mellitus    Depression    Seizure    Hyperlipidemia    GERD (gastroesophageal reflux disease)    Epilepsy    Plantar fasciitis, bilateral    Papilledema associated with increased intracranial pressure    Numbness of right hand    Type 2 diabetes mellitus with stage 3 chronic kidney disease, with long-term current use of insulin    Localz-rltd symptomatic epilepsy w cmplx part sz, intract, wo status    Complex partial epilepsy, intractable     Current Outpatient Prescriptions on File Prior to Visit   Medication Sig Dispense Refill    acetaZOLAMIDE (DIAMOX) 500 mg CpSR Take 500 mg by mouth 2 (two) times daily.      atorvastatin (LIPITOR) 20 MG tablet Take 1 tablet (20 mg total) by mouth once daily. 90 tablet 1    blood sugar diagnostic Strp 1 each by Misc.(Non-Drug; Combo Route) route 4 (four) times daily before meals and nightly. ACCU CHEK ELVIA PLUS METER 200 each 3    blood-glucose meter (ACCU-CHEK ELVIA PLUS METER) Misc TEST FOUR TIMES DAILY BEFORE MEALS AND EVERY NIGHT 90 each 1    capsaicin 0.1 % Crea Apply 1 application topically 2 (two) times daily. 56.6 g 1    gabapentin (NEURONTIN) 300 MG capsule Take 2 capsules (600 mg total) by mouth every evening. 180 capsule 11    lancets (ACCU-CHEK SOFTCLIX LANCETS) Misc 1 Device by Misc.(Non-Drug; Combo Route) route 4 (four) times daily. 200 each 3    lancets 25 gauge Misc 1 lancet by Misc.(Non-Drug; Combo Route) route 4 (four) times daily before meals and nightly. 200 each 11    LINZESS 290 mcg Cap TK ONE CAPSULE PO D ON AN  EMPTY STOMACH  5    losartan (COZAAR) 100 MG tablet Take 1 tablet (100 mg total) by mouth once daily. 90 tablet 3    metformin (GLUCOPHAGE-XR) 500 MG 24 hr tablet Take 2 tablets (1,000 mg total) by mouth 2 (two) times daily with meals. 360 tablet 0    metformin (GLUCOPHAGE-XR) 500 MG 24 hr tablet TAKE 1 TABLET(500 MG) BY MOUTH EVERY DAY 90 tablet 0    mometasone 0.1% (ELOCON) 0.1 % cream       NOVOLOG FLEXPEN 100 unit/mL InPn pen ADM 15 UNITS SC TID B MEALS  11    quetiapine (SEROQUEL) 25 MG Tab Take 1 tablet (25 mg total) by mouth every evening. 30 tablet 0    tiZANidine (ZANAFLEX) 4 MG tablet       topiramate (TOPAMAX) 100 MG tablet Take 3 tablets (300 mg total) by mouth 2 (two) times daily. Please start taking 2 tablet (200 mg) twice a day for the next week. Then increase to additional tablet in evenings (3 tablets at night and 2 in morning) for 1 week. Then increase to 3 tablets in morning and 3 tablets in evening. 90 tablet 11    triamcinolone acetonide 0.1% (KENALOG) 0.1 % ointment       venlafaxine (EFFEXOR-XR) 75 MG 24 hr capsule TAKE ONE CAPSULE BY MOUTH EVERY EVENING 90 capsule 0    venlafaxine (EFFEXOR-XR) 75 MG 24 hr capsule Take 1 capsule (75 mg total) by mouth once daily. 90 capsule 3    zonisamide (ZONEGRAN) 50 MG Cap       [DISCONTINUED] albuterol 90 mcg/actuation inhaler Inhale 2 puffs into the lungs every 6 (six) hours as needed for Wheezing. Rescue 1 Inhaler 0    [DISCONTINUED] albuterol-ipratropium 2.5mg-0.5mg/3mL (DUO-NEB) 0.5 mg-3 mg(2.5 mg base)/3 mL nebulizer solution Take 3 mLs by nebulization every 6 (six) hours as needed for Wheezing or Shortness of Breath. Rescue 100 mL 0    [DISCONTINUED] fluticasone-vilanterol (BREO ELLIPTA) 200-25 mcg/dose DsDv diskus inhaler Inhale 1 puff into the lungs once daily. 1 each 3    hydrocortisone 2.5 % cream Apply topically 2 (two) times daily. 20 g 0    omeprazole (PRILOSEC) 20 MG capsule       oxyCODONE-acetaminophen (PERCOCET)   "mg per tablet       propranolol (INDERAL) 20 MG tablet Take 1 tablet (20 mg total) by mouth 2 (two) times daily. 60 tablet 0    TAZTIA  mg CpSR TK ONE CAPSULE PO D  2    [DISCONTINUED] fluticasone (FLONASE) 50 mcg/actuation nasal spray 1 spray by Each Nare route daily as needed. 16 g 1    [DISCONTINUED] hydrOXYzine pamoate (VISTARIL) 50 MG Cap Take 2 capsules (100 mg total) by mouth every 8 (eight) hours as needed (itching). 90 capsule 0    [DISCONTINUED] levoFLOXacin (LEVAQUIN) 750 MG tablet Take 1 tablet (750 mg total) by mouth once daily. 10 tablet 0    [DISCONTINUED] montelukast (SINGULAIR) 10 mg tablet   1     No current facility-administered medications on file prior to visit.          Review of Systems   Constitutional: Negative for chills, fever and malaise/fatigue.   HENT: Positive for congestion. Negative for ear discharge.    Eyes: Negative for pain and discharge.   Respiratory: Positive for cough, shortness of breath and wheezing. Negative for hemoptysis and stridor.    Cardiovascular: Negative for chest pain and palpitations.   Gastrointestinal: Negative for abdominal pain, blood in stool, nausea and vomiting.   Genitourinary: Negative for dysuria, flank pain and hematuria.   Skin: Positive for itching and rash.   Neurological: Negative for seizures and loss of consciousness.       Objective:   BP (!) 140/72 (BP Location: Right arm, Patient Position: Sitting, BP Method: Large (Manual))   Pulse 72   Ht 5' 4" (1.626 m)   Wt (!) 159.7 kg (352 lb 1.2 oz)   SpO2 99%   BMI 60.43 kg/m²     Patient demonstrates extremely poor use of her metered-dose inhaler    Physical Exam   Constitutional: She is well-developed, well-nourished, and in no distress. No distress.   Obese   HENT:   Right Ear: Tympanic membrane normal.   Left Ear: Tympanic membrane normal.   Nose: No nasal deformity. No epistaxis.  No foreign bodies.   Mouth/Throat: No uvula swelling. No oropharyngeal exudate or tonsillar " abscesses.   Cobblestoning on posterior tongue.  ALLERGIC crease on nose.   Eyes: Conjunctivae are normal. Right eye exhibits no discharge. Left eye exhibits no discharge.   Cardiovascular:   Heart sounds barely audible regular rate and rhythm   Pulmonary/Chest: Effort normal and breath sounds normal. No stridor. She has no wheezes. She has no rales.   Abdominal: Soft. There is no tenderness.   Musculoskeletal: She exhibits no edema.   Lymphadenopathy:     She has no cervical adenopathy.   Neurological: She is alert. GCS score is 15.   Skin: Skin is warm and dry. Rash noted.   1.  Area of redness after scratching left antecubital fossa  2.  multiple areas of hyperpigmentation, including flat, linear marks on back and circular macules on chest and arms.   Psychiatric: Affect and judgment normal.     Data:   Record review:  In ED, occasional expiratory wheezes documented. Pulse 78, sat % on unknown O2.    Eo count 500 on September 2017 CBC.    CxR (PA and lat, 9/21/2017): normal by report.    No PFT's available in EPIC     Assessment:     1. Itching    2. Moderate persistent asthma with acute exacerbation    3. Rhinitis, unspecified chronicity, unspecified type    4. Dermatographism    5. Tobacco abuse    6. Moderate persistent asthma with exacerbation          Comorbidities:  Poor inhaler technique  Question of adherence  HTN  Beta blocker  DM  Smoking  GERD    Plan:     Yanni was seen today for itching and nasal congestion.    Diagnoses and all orders for this visit:    Itching  -     Shrimp IgE; Future  -     Crayfish, freshwater IgE; Future  -     Crab IgE; Future  -     Lobster IgE; Future  -     cetirizine (ZYRTEC) 10 MG tablet; Take 1 tablet (10 mg total) by mouth 2 (two) times daily.         - Continue Benadryl as needed    Moderate persistent asthma, uncontrolled  -     albuterol 90 mcg/actuation inhaler; Inhale 2 puffs into the lungs every 6 (six) hours as needed for Wheezing. Rescue  -      budesonide-formoterol 160-4.5 mcg (SYMBICORT) 160-4.5 mcg/actuation HFAA; Inhale 2 puffs into the lungs every 12 (twelve) hours. Controller  -     mometasone (NASONEX) 50 mcg/actuation nasal spray; 2 sprays by Nasal route once daily.  -     albuterol-ipratropium 2.5mg-0.5mg/3mL (DUO-NEB) 0.5 mg-3 mg(2.5 mg base)/3 mL nebulizer solution; Take 3 mLs by nebulization every 6 (six) hours as needed for Wheezing or Shortness of Breath. Rescue    Rhinitis, unspecified chronicity, unspecified type  -     IgE; Future  -     Dermatophagoides Leland; Future  -     Dermatophagoides Pteronyssinus; Future  -     [place]; Future  -     Valentino; Future  -     [place]; Future  -     English Plantain; Future  -     Oak Pecan; Future  -     Marsh Elder; Future  -     Ragweed; Future  -     Alternaria; Future  -     Aspergillus; Future  -     Cat; Future  -     Cockroach; Future  -     Dog; Future    Dermatographism - noted.  Treat as for itching above.    Tobacco abuse  Quit smoking    Incorrect use of inhaler  MD taught use.  Will review again next visit.          Patient Instructions   Allergy testing today      ITCHING  Zyrtec = cetirizine:  1 tablet twice a day  Extra Benadryl as often as needed.      ASTHMA  Red Symbicort 2 puffs twice a day  Blue Ventolin or machine as often as needed  (Do not use Advair, Breo or Singulair for now.)    NOSE SYMPTOMS  Mometasone nasal spray:  2 squirts each nostril twice a day   Remember to aim out toward your ear.   Needs to be used regularly 5-14 days for full effect.      Follow up in 1-3 weeks.  Bring all inhalers with you.      Return in about 2 weeks (around 1/18/2018).    Rafia Hart MD

## 2018-01-04 NOTE — PATIENT INSTRUCTIONS
Allergy testing today      ITCHING  Zyrtec = cetirizine:  1 tablet twice a day  Extra Benadryl as often as needed.      ASTHMA  Red Symbicort 2 puffs twice a day  Blue Ventolin or machine as often as needed  (Do not use Advair, Breo or Singulair for now.)    NOSE SYMPTOMS  Mometasone nasal spray:  2 squirts each nostril twice a day   Remember to aim out toward your ear.   Needs to be used regularly 5-14 days for full effect.      Follow up in 1-3 weeks.  Bring all inhalers with you.

## 2018-01-05 DIAGNOSIS — E11.9 TYPE 2 DIABETES MELLITUS WITHOUT COMPLICATION: ICD-10-CM

## 2018-01-05 LAB — IGE SERPL-ACNC: 59 IU/ML

## 2018-01-08 LAB
A ALTERNATA IGE QN: <0.35 KU/L
A FUMIGATUS IGE QN: <0.35 KU/L
BERMUDA GRASS IGE QN: <0.35 KU/L
CAT DANDER IGE QN: <0.35 KU/L
CEDAR IGE QN: <0.35 KU/L
CRAB IGE QN: <0.35 KU/L
CRAWFISH IGE QN: <0.35 KU/L
D FARINAE IGE QN: 0.43 KU/L
D PTERONYSS IGE QN: 0.43 KU/L
DEPRECATED A ALTERNATA IGE RAST QL: NORMAL
DEPRECATED A FUMIGATUS IGE RAST QL: NORMAL
DEPRECATED BERMUDA GRASS IGE RAST QL: NORMAL
DEPRECATED CAT DANDER IGE RAST QL: NORMAL
DEPRECATED CEDAR IGE RAST QL: NORMAL
DEPRECATED CRAB IGE RAST QL: NORMAL
DEPRECATED CRAWFISH IGE RAST QL: NORMAL
DEPRECATED D FARINAE IGE RAST QL: ABNORMAL
DEPRECATED D PTERONYSS IGE RAST QL: ABNORMAL
DEPRECATED DOG DANDER IGE RAST QL: NORMAL
DEPRECATED ENGL PLANTAIN IGE RAST QL: NORMAL
DEPRECATED LOBSTER IGE RAST QL: NORMAL
DEPRECATED MARSH ELDER IGE RAST QL: NORMAL
DEPRECATED ROACH IGE RAST QL: NORMAL
DEPRECATED SHRIMP IGE RAST QL: NORMAL
DEPRECATED TIMOTHY IGE RAST QL: NORMAL
DEPRECATED WHITE OAK IGE RAST QL: NORMAL
DOG DANDER IGE QN: <0.35 KU/L
ENGL PLANTAIN IGE QN: <0.35 KU/L
LOBSTER IGE QN: <0.35 KU/L
MARSH ELDER IGE QN: <0.35 KU/L
RAGWEED, WESTERN IGE: <0.35 KU/L
RAGWEED, WESTERN, CLASS: NORMAL
ROACH IGE QN: <0.35 KU/L
SHRIMP IGE QN: <0.35 KU/L
TIMOTHY IGE QN: <0.35 KU/L
WHITE OAK IGE QN: <0.35 KU/L

## 2018-01-11 ENCOUNTER — PES CALL (OUTPATIENT)
Dept: ADMINISTRATIVE | Facility: CLINIC | Age: 48
End: 2018-01-11

## 2018-01-22 ENCOUNTER — OFFICE VISIT (OUTPATIENT)
Dept: FAMILY MEDICINE | Facility: CLINIC | Age: 48
End: 2018-01-22
Payer: MEDICARE

## 2018-01-22 ENCOUNTER — LAB VISIT (OUTPATIENT)
Dept: LAB | Facility: HOSPITAL | Age: 48
End: 2018-01-22
Attending: FAMILY MEDICINE
Payer: MEDICARE

## 2018-01-22 VITALS
BODY MASS INDEX: 50.02 KG/M2 | HEART RATE: 84 BPM | DIASTOLIC BLOOD PRESSURE: 96 MMHG | TEMPERATURE: 98 F | WEIGHT: 293 LBS | HEIGHT: 64 IN | SYSTOLIC BLOOD PRESSURE: 150 MMHG | RESPIRATION RATE: 16 BRPM | OXYGEN SATURATION: 97 %

## 2018-01-22 DIAGNOSIS — H47.11 PAPILLEDEMA ASSOCIATED WITH INCREASED INTRACRANIAL PRESSURE: ICD-10-CM

## 2018-01-22 DIAGNOSIS — J41.0 SIMPLE CHRONIC BRONCHITIS: ICD-10-CM

## 2018-01-22 DIAGNOSIS — E78.2 COMBINED HYPERLIPIDEMIA ASSOCIATED WITH TYPE 2 DIABETES MELLITUS: ICD-10-CM

## 2018-01-22 DIAGNOSIS — E11.22 TYPE 2 DIABETES MELLITUS WITH STAGE 3 CHRONIC KIDNEY DISEASE, WITH LONG-TERM CURRENT USE OF INSULIN: ICD-10-CM

## 2018-01-22 DIAGNOSIS — N18.30 TYPE 2 DIABETES MELLITUS WITH STAGE 3 CHRONIC KIDNEY DISEASE, WITH LONG-TERM CURRENT USE OF INSULIN: ICD-10-CM

## 2018-01-22 DIAGNOSIS — I10 ESSENTIAL HYPERTENSION: Chronic | ICD-10-CM

## 2018-01-22 DIAGNOSIS — E11.9 TYPE 2 DIABETES MELLITUS WITHOUT COMPLICATION: ICD-10-CM

## 2018-01-22 DIAGNOSIS — G40.219 LOCALZ-RLTD SYMPTOMATIC EPILEPSY W CMPLX PART SZ, INTRACT, WO STATUS: ICD-10-CM

## 2018-01-22 DIAGNOSIS — F33.42 RECURRENT MAJOR DEPRESSIVE DISORDER, IN FULL REMISSION: ICD-10-CM

## 2018-01-22 DIAGNOSIS — Z79.4 TYPE 2 DIABETES MELLITUS WITH STAGE 3 CHRONIC KIDNEY DISEASE, WITH LONG-TERM CURRENT USE OF INSULIN: ICD-10-CM

## 2018-01-22 DIAGNOSIS — E66.01 MORBID OBESITY WITH BMI OF 60.0-69.9, ADULT: Chronic | ICD-10-CM

## 2018-01-22 DIAGNOSIS — G40.909 NONINTRACTABLE EPILEPSY WITHOUT STATUS EPILEPTICUS, UNSPECIFIED EPILEPSY TYPE: Chronic | ICD-10-CM

## 2018-01-22 DIAGNOSIS — E11.69 COMBINED HYPERLIPIDEMIA ASSOCIATED WITH TYPE 2 DIABETES MELLITUS: ICD-10-CM

## 2018-01-22 DIAGNOSIS — E44.1 MILD PROTEIN MALNUTRITION: Chronic | ICD-10-CM

## 2018-01-22 DIAGNOSIS — E11.8 CONTROLLED TYPE 2 DIABETES MELLITUS WITH COMPLICATION, WITHOUT LONG-TERM CURRENT USE OF INSULIN: Chronic | ICD-10-CM

## 2018-01-22 DIAGNOSIS — Z72.0 TOBACCO ABUSE: Chronic | ICD-10-CM

## 2018-01-22 DIAGNOSIS — G40.909 SEIZURE DISORDER: Chronic | ICD-10-CM

## 2018-01-22 DIAGNOSIS — G47.33 OSA (OBSTRUCTIVE SLEEP APNEA): ICD-10-CM

## 2018-01-22 DIAGNOSIS — Z00.00 ENCOUNTER FOR PREVENTIVE HEALTH EXAMINATION: ICD-10-CM

## 2018-01-22 DIAGNOSIS — E78.5 HYPERLIPIDEMIA, UNSPECIFIED HYPERLIPIDEMIA TYPE: Chronic | ICD-10-CM

## 2018-01-22 DIAGNOSIS — D63.8 ANEMIA OF CHRONIC DISEASE: Chronic | ICD-10-CM

## 2018-01-22 LAB
ANION GAP SERPL CALC-SCNC: 2 MMOL/L
BUN SERPL-MCNC: 12 MG/DL
CALCIUM SERPL-MCNC: 8.8 MG/DL
CHLORIDE SERPL-SCNC: 111 MMOL/L
CHOLEST SERPL-MCNC: 201 MG/DL
CHOLEST/HDLC SERPL: 3.6 {RATIO}
CO2 SERPL-SCNC: 27 MMOL/L
CREAT SERPL-MCNC: 1.1 MG/DL
EST. GFR  (AFRICAN AMERICAN): >60 ML/MIN/1.73 M^2
EST. GFR  (NON AFRICAN AMERICAN): >60 ML/MIN/1.73 M^2
GLUCOSE SERPL-MCNC: 96 MG/DL
HDLC SERPL-MCNC: 56 MG/DL
HDLC SERPL: 27.9 %
LDLC SERPL CALC-MCNC: 124.2 MG/DL
NONHDLC SERPL-MCNC: 145 MG/DL
POTASSIUM SERPL-SCNC: 4.2 MMOL/L
SODIUM SERPL-SCNC: 140 MMOL/L
TRIGL SERPL-MCNC: 104 MG/DL

## 2018-01-22 PROCEDURE — 99999 PR PBB SHADOW E&M-EST. PATIENT-LVL III: CPT | Mod: PBBFAC,,, | Performed by: NURSE PRACTITIONER

## 2018-01-22 PROCEDURE — 80061 LIPID PANEL: CPT

## 2018-01-22 PROCEDURE — 99499 UNLISTED E&M SERVICE: CPT | Mod: S$GLB,,, | Performed by: NURSE PRACTITIONER

## 2018-01-22 PROCEDURE — 80048 BASIC METABOLIC PNL TOTAL CA: CPT

## 2018-01-22 PROCEDURE — 36415 COLL VENOUS BLD VENIPUNCTURE: CPT | Mod: PN

## 2018-01-22 PROCEDURE — 83036 HEMOGLOBIN GLYCOSYLATED A1C: CPT

## 2018-01-22 PROCEDURE — G0402 INITIAL PREVENTIVE EXAM: HCPCS | Mod: S$GLB,,, | Performed by: NURSE PRACTITIONER

## 2018-01-22 RX ORDER — ACETAZOLAMIDE 500 MG/1
500 CAPSULE, EXTENDED RELEASE ORAL 2 TIMES DAILY
Qty: 60 CAPSULE | Refills: 2 | Status: SHIPPED | OUTPATIENT
Start: 2018-01-22 | End: 2018-06-21 | Stop reason: SDUPTHER

## 2018-01-22 RX ORDER — BUPROPION HYDROCHLORIDE 150 MG/1
150 TABLET ORAL DAILY
Qty: 30 TABLET | Refills: 0 | Status: SHIPPED | OUTPATIENT
Start: 2018-01-22 | End: 2018-09-10

## 2018-01-22 NOTE — PROGRESS NOTES
"Yanni Kaiser presented for a  Medicare AWV and comprehensive Health Risk Assessment today. The following components were reviewed and updated:    · Medical history  · Family History  · Social history  · Allergies and Current Medications  · Health Risk Assessment  · Health Maintenance  · Care Team     ** See Completed Assessments for Annual Wellness Visit within the encounter summary.**       The following assessments were completed:  · Living Situation  · CAGE  · Depression Screening  · Timed Get Up and Go  · Whisper Test  · Cognitive Function Screening  · Nutrition Screening  · ADL Screening  · PAQ Screening    Vitals:    01/22/18 0856   BP: (!) 150/96   Pulse: 84   Resp: 16   Temp: 98.3 °F (36.8 °C)   TempSrc: Oral   SpO2: 97%   Weight: (!) 161.5 kg (356 lb 0.7 oz)   Height: 5' 4" (1.626 m)     Body mass index is 61.11 kg/m².  Physical Exam    She reports feeling more depressed over the last 1-2 months, denies SI/HI, she stays in bed all day and "just wants to sleep"  She also reports increased snacking late at night. She states her dad passed away in 2011 just before Christmas, and his birthday is in November, and he's been "on her mind" lately.     Diagnoses and health risks identified today and associated recommendations/orders:  Anemia of chronic disease  Comments:  controlled, continue to monitor    Hyperlipidemia, unspecified hyperlipidemia type  Comments:  controlled, continue to monitor    Essential hypertension  Comments:  controlled, continue to monitor, continue current treatment losartan    Nonintractable epilepsy without status epilepticus, unspecified epilepsy type  Comments:  controlled, continue to monitor, continue current treatment topamax, propanolol    Localz-rltd symptomatic epilepsy w cmplx part sz, intract, wo status  Comments:  controlled, continue to monitor, continue current treatment topamax, propanolol    Recurrent major depressive disorder, in full remission  Comments:  controlled, " continue to monitor, continue current treatment effexor and seroquel daily, add wellbutrin  Orders:  -     buPROPion (WELLBUTRIN XL) 150 MG TB24 tablet; Take 1 tablet (150 mg total) by mouth once daily.  Dispense: 30 tablet; Refill: 0  She will f/u with her PCP next week to assess the effects.    SHARATH (obstructive sleep apnea)  Comments:  controlled, continue to monitor, continue current treatment    Controlled type 2 diabetes mellitus with complication, without long-term current use of insulin  Comments:  controlled, continue to monitor, continue current treatment metformin daily, novolog prn    Type 2 diabetes mellitus with stage 3 chronic kidney disease, with long-term current use of insulin  Comments:  controlled, continue to monitor, continue current treatment metformin daily, novolog prn  Orders:  -     Basic metabolic panel; Future; Expected date: 01/22/2018    Seizure disorder  Comments:  controlled, continue to monitor, continue current treatment propanolol    Papilledema associated with increased intracranial pressure  Comments:  controlled, continue to monitor, continue current treatment diamox  Orders:  -     acetaZOLAMIDE (DIAMOX) 500 mg CpSR; Take 1 capsule (500 mg total) by mouth 2 (two) times daily.  Dispense: 60 capsule; Refill: 2    Mild protein malnutrition  Comments:  controlled, continue to monitor    Morbid obesity with BMI of 60.0-69.9, adult  Comments:  Encouraged healthy diet and exercise    Combined hyperlipidemia associated with type 2 diabetes mellitus  Comments:  controlled, continue to monitor  Orders:  -     Lipid panel; Future; Expected date: 01/22/2018    Simple chronic bronchitis  Comments:  controlled, continue to monitor, continue current treatment: symbicort, and nebs prn    Tobacco abuse  Comments:  Encouraged cessation          Provided Efraintrice with a 5-10 year written screening schedule and personal prevention plan. Recommendations were developed using the USPSTF age  appropriate recommendations. Education, counseling, and referrals were provided as needed. After Visit Summary printed and given to patient which includes a list of additional screenings\tests needed.    No Follow-up on file.    LARISSA JensenC

## 2018-01-23 LAB
ESTIMATED AVG GLUCOSE: 120 MG/DL
HBA1C MFR BLD HPLC: 5.8 %

## 2018-01-23 NOTE — PROGRESS NOTES
Allergy Clinic Note  [facility]    Subjective:      Patient ID: Yanni Kaiser is a 47 y.o. female.    Chief Complaint: Follow-up        History of Present Illness: Yanni Kaiser is a 47 y.o. female  has a past medical history of rhinitis (mostly nonallergic) and GERD. Client returns at my request to follow up symptoms of asthma, itching and rash and to review test results.      At last visit, for her itching,  she was prescribed Zyrtec 10 mg twice a day with extra Benadryl if needed.  She reports her itching is unchanged on this regimen.  She continues to have outbreaks of pink patches at areas of clubbing and tightness.  She has had no new brown areas.  On her skin she is using an unknown salve calls petrolatum with benefit.    For her asthma she was instructed to use Symbicort 2 puffs twice a day and was taught correct use of inhaler.  She states greater than 50% compliance with her controller medication.  She is  overall improved with no nebulizer use this week, no nocturnal awakenings.  She estimates her exercise tolerance at maybe 1-2 blocks.    For her rhinitis she was instructed to use mometasone (Nasonex) 2 squirts each nostril twice a day.  She continues to complain of head congestion, awakening with puffy and crusty eyelids bilaterally, and nasal congestion.  She also complains of cough at the level of her throat.  She states weather is her major trigger.    Additional History:   Past medical history is unchanged and significant for  diabetes and GERD.  She is status post sinus surgery  Client   reports that she has been smoking Cigarettes.  She has a 15.00 pack-year smoking history. She has never used smokeless tobacco.      Patient Active Problem List   Diagnosis    Mild intermittent asthma    SHARATH (obstructive sleep apnea)    Leg varices    Low back pain    Seizure disorder    Type 2 diabetes mellitus, controlled    Migraine    Morbid obesity with BMI of 60.0-69.9,  adult    Tobacco abuse    Exacerbation of asthma    Anemia of chronic disease    Mild protein malnutrition    Weakness    Allergic rhinitis    Idiopathic intracranial hypertension    Essential hypertension    Combined hyperlipidemia associated with type 2 diabetes mellitus    Depression    Hyperlipidemia    GERD (gastroesophageal reflux disease)    Epilepsy    Plantar fasciitis, bilateral    Numbness of right hand    Type 2 diabetes mellitus with stage 3 chronic kidney disease, with long-term current use of insulin    Localz-rltd symptomatic epilepsy w cmplx part sz, intract, wo status    Complex partial epilepsy, intractable    Recurrent major depressive disorder, in full remission    Simple chronic bronchitis     Current Outpatient Prescriptions on File Prior to Visit   Medication Sig Dispense Refill    acetaZOLAMIDE (DIAMOX) 500 mg CpSR Take 1 capsule (500 mg total) by mouth 2 (two) times daily. 60 capsule 2    albuterol 90 mcg/actuation inhaler Inhale 2 puffs into the lungs every 6 (six) hours as needed for Wheezing. Rescue 1 Inhaler 0    albuterol-ipratropium 2.5mg-0.5mg/3mL (DUO-NEB) 0.5 mg-3 mg(2.5 mg base)/3 mL nebulizer solution Take 3 mLs by nebulization every 6 (six) hours as needed for Wheezing or Shortness of Breath. Rescue 100 mL 0    blood sugar diagnostic Strp 1 each by Misc.(Non-Drug; Combo Route) route 4 (four) times daily before meals and nightly. ACCU CHEK ELVIA PLUS METER 200 each 3    blood-glucose meter (ACCU-CHEK ELVIA PLUS METER) Misc TEST FOUR TIMES DAILY BEFORE MEALS AND EVERY NIGHT 90 each 1    budesonide-formoterol 160-4.5 mcg (SYMBICORT) 160-4.5 mcg/actuation HFAA Inhale 2 puffs into the lungs every 12 (twelve) hours. Controller 1 Inhaler 5    buPROPion (WELLBUTRIN XL) 150 MG TB24 tablet Take 1 tablet (150 mg total) by mouth once daily. 30 tablet 0    cetirizine (ZYRTEC) 10 MG tablet Take 1 tablet (10 mg total) by mouth 2 (two) times daily. 60 tablet 3     gabapentin (NEURONTIN) 300 MG capsule Take 2 capsules (600 mg total) by mouth every evening. 180 capsule 11    lancets (ACCU-CHEK SOFTCLIX LANCETS) Misc 1 Device by Misc.(Non-Drug; Combo Route) route 4 (four) times daily. 200 each 3    lancets 25 gauge Misc 1 lancet by Misc.(Non-Drug; Combo Route) route 4 (four) times daily before meals and nightly. 200 each 11    LINZESS 290 mcg Cap TK ONE CAPSULE PO D ON AN EMPTY STOMACH  5    losartan (COZAAR) 100 MG tablet Take 1 tablet (100 mg total) by mouth once daily. 90 tablet 3    metformin (GLUCOPHAGE-XR) 500 MG 24 hr tablet Take 2 tablets (1,000 mg total) by mouth 2 (two) times daily with meals. 360 tablet 0    mometasone (NASONEX) 50 mcg/actuation nasal spray 2 sprays by Nasal route once daily. 1 each 11    mometasone 0.1% (ELOCON) 0.1 % cream       NOVOLOG FLEXPEN 100 unit/mL InPn pen ADM 15 UNITS SC TID B MEALS  11    oxyCODONE-acetaminophen (PERCOCET)  mg per tablet       pantoprazole (PROTONIX) 40 MG tablet Take 40 mg by mouth once daily.      propranolol (INDERAL) 20 MG tablet Take 1 tablet (20 mg total) by mouth 2 (two) times daily. 60 tablet 0    quetiapine (SEROQUEL) 25 MG Tab Take 1 tablet (25 mg total) by mouth every evening. 30 tablet 0    TAZTIA  mg CpSR TK ONE CAPSULE PO D  2    tiZANidine (ZANAFLEX) 4 MG tablet       topiramate (TOPAMAX) 100 MG tablet Take 3 tablets (300 mg total) by mouth 2 (two) times daily. Please start taking 2 tablet (200 mg) twice a day for the next week. Then increase to additional tablet in evenings (3 tablets at night and 2 in morning) for 1 week. Then increase to 3 tablets in morning and 3 tablets in evening. 90 tablet 11    triamcinolone acetonide 0.1% (KENALOG) 0.1 % ointment       venlafaxine (EFFEXOR-XR) 75 MG 24 hr capsule TAKE ONE CAPSULE BY MOUTH EVERY EVENING 90 capsule 0    zonisamide (ZONEGRAN) 50 MG Cap       capsaicin 0.1 % Crea Apply 1 application topically 2 (two) times daily. 56.6 g 1  "    No current facility-administered medications on file prior to visit.          Review of Systems   Constitutional: Negative for chills, fever and malaise/fatigue.   HENT: Positive for congestion. Negative for ear discharge.    Eyes: Positive for discharge. Negative for pain.   Respiratory: Positive for cough. Negative for hemoptysis, shortness of breath, wheezing and stridor.    Cardiovascular: Negative for chest pain and palpitations.   Gastrointestinal: Negative for abdominal pain, blood in stool, nausea and vomiting.   Genitourinary: Negative for dysuria, flank pain and hematuria.   Musculoskeletal: Positive for back pain.   Skin: Positive for itching and rash.   Neurological: Negative for seizures and loss of consciousness.       Objective:   /84 (BP Location: Left arm, Patient Position: Sitting)   Ht 5' 4" (1.626 m)   Wt (!) 159.6 kg (351 lb 13.7 oz)   BMI 60.40 kg/m²       Physical Exam   Constitutional: She is oriented to person, place, and time and well-developed, well-nourished, and in no distress.   HENT:   TMs clear bilaterally.  Nares pink with no significant swelling.  Oropharynx benign without exudate.  Tongue is not coated.  There is cobblestoning on her posterior tongue.   Eyes: Conjunctivae are normal. Pupils are equal, round, and reactive to light.   Neck: Neck supple.   Cardiovascular: Normal rate and regular rhythm.    Pulmonary/Chest: Effort normal. No respiratory distress. She has no wheezes. She has no rales.   Breath sounds decreased   Musculoskeletal: She exhibits no deformity.   Lymphadenopathy:     She has no cervical adenopathy.   Neurological: She is alert and oriented to person, place, and time.   Skin: No rash noted. No erythema.   Psychiatric: Affect normal.       Data: Specific IgE by Immunocap method is negative for crustaceans and for all aeroallergens except class I reactivity to dust mites      Assessment:     1. Persistent asthma without complication, unspecified " asthma severity    2. Itching    3. Other chronic rhinitis        Plan:     Yanni was seen today for follow-up.    Diagnoses and all orders for this visit:    Persistent asthma without complication, unspecified asthma severity  -Control improving.  Technique improving.  Continue Symbicort 2 puffs twice a day.  Refilled albuterol inhaler.  Anticipate pulmonary function testing at future visit    Itching - unchanged  Will increase H1 antihistamines to high-dose Atarax at bedtime with or without Zyrtec.  Patient requests 50 mg tablets (rather than my usual 25 mg) in order to decrease the number of pills taken.  -     Discontinue: hydrOXYzine HCl (ATARAX) 25 MG tablet; 1 to 8 tablets at bedtime.  Start with 3 tablets.  Increase or decrease as needed until itching is controlled or a maximum of 8 tablets.  -     hydrOXYzine (ATARAX) 50 MG tablet; Take 1-4 at night for itching.    Rhinitis-mostly nonallergic; low level mite sensitivity-still asthmatic   Dust proof pillow cover - patient states she already has one.  Continue Nasonex  Optional Neomed sinus rinse every morning        Patient Instructions   Not allergic to seafood  Mildly allergic to house dust mites.  Asthma improved.      Recommend:    1.  No food restrictions    2.  For asthma:  Continue Symbicort 2 puffs twice a day no matter what  For rescue, use pump or machine as often as needed    3.  For nose and eyes  Get a dust proof cover.  Continue nasal spray  Optional: Add Neillmed sinus rinse in the morning (before your nose spray)   Can use packets or can make your own salt water solution.     Nasal rinse recipe   1 cup DISTILLED water (warm or room temperature)   1/2 teaspoon salt   1/4 teaspoon salt     (It's important not to use tap or regular bottled water.  If you don't have distilled  water, you can boil tap water to sterilize it, then let it cool.)    4.  For itching  At bedtime, take hydroxyzine (50mg) 1-4 tablets   Recommend starting at 1-2 tablets  tonight.  Continue Zyrtec if it is helping.  Stop if it is not.  Extra Benadryl as needed.    Return in 4-6 weeks  Return sooner if itching is not controlled.                  Follow-up in about 4 weeks (around 2/22/2018).   Call or return sooner if itching is not controlled.    Rafia Hart MD    Education: Reviewed test results in detail  Dust avoidance measures packet given.  Nasal rinse method taught by demonstration  Written instructions for saline solution with distilled water given.  Reviewed inhaler technique.  Plan to review again next visit.

## 2018-01-25 ENCOUNTER — OFFICE VISIT (OUTPATIENT)
Dept: ALLERGY | Facility: CLINIC | Age: 48
End: 2018-01-25
Payer: MEDICARE

## 2018-01-25 VITALS
BODY MASS INDEX: 50.02 KG/M2 | WEIGHT: 293 LBS | HEIGHT: 64 IN | SYSTOLIC BLOOD PRESSURE: 128 MMHG | DIASTOLIC BLOOD PRESSURE: 84 MMHG

## 2018-01-25 DIAGNOSIS — J31.0 OTHER CHRONIC RHINITIS: ICD-10-CM

## 2018-01-25 DIAGNOSIS — L29.9 ITCHING: ICD-10-CM

## 2018-01-25 DIAGNOSIS — J45.909 PERSISTENT ASTHMA WITHOUT COMPLICATION, UNSPECIFIED ASTHMA SEVERITY: Primary | ICD-10-CM

## 2018-01-25 PROCEDURE — 99499 UNLISTED E&M SERVICE: CPT | Mod: S$GLB,,, | Performed by: STUDENT IN AN ORGANIZED HEALTH CARE EDUCATION/TRAINING PROGRAM

## 2018-01-25 PROCEDURE — 99999 PR PBB SHADOW E&M-EST. PATIENT-LVL III: CPT | Mod: PBBFAC,,, | Performed by: STUDENT IN AN ORGANIZED HEALTH CARE EDUCATION/TRAINING PROGRAM

## 2018-01-25 PROCEDURE — 99214 OFFICE O/P EST MOD 30 MIN: CPT | Mod: S$GLB,,, | Performed by: STUDENT IN AN ORGANIZED HEALTH CARE EDUCATION/TRAINING PROGRAM

## 2018-01-25 RX ORDER — HYDROXYZINE HYDROCHLORIDE 50 MG/1
TABLET, FILM COATED ORAL
Qty: 120 TABLET | Refills: 3 | Status: SHIPPED | OUTPATIENT
Start: 2018-01-25 | End: 2019-01-11 | Stop reason: SDUPTHER

## 2018-01-25 RX ORDER — HYDROXYZINE HYDROCHLORIDE 25 MG/1
TABLET, FILM COATED ORAL
Qty: 240 TABLET | Refills: 3 | Status: SHIPPED | OUTPATIENT
Start: 2018-01-25 | End: 2018-01-25

## 2018-01-25 NOTE — PATIENT INSTRUCTIONS
Not allergic to seafood  Mildly allergic to house dust mites.  Asthma improved.      Recommend:    1.  No food restrictions    2.  For asthma:  Continue Symbicort 2 puffs twice a day no matter what  For rescue, use pump or machine as often as needed    3.  For nose and eyes  Get a dust proof cover.  Continue nasal spray  Optional: Add Neillmed sinus rinse in the morning (before your nose spray)   Can use packets or can make your own salt water solution.     Nasal rinse recipe   1 cup DISTILLED water (warm or room temperature)   1/2 teaspoon salt   1/4 teaspoon salt     (It's important not to use tap or regular bottled water.  If you don't have distilled  water, you can boil tap water to sterilize it, then let it cool.)    4.  For itching  At bedtime, take hydroxyzine (50mg) 1-4 tablets   Recommend starting at 1-2 tablets tonight.  Continue Zyrtec if it is helping.  Stop if it is not.  Extra Benadryl as needed.    Return in 4-6 weeks  Return sooner if itching is not controlled.

## 2018-01-30 ENCOUNTER — TELEPHONE (OUTPATIENT)
Dept: FAMILY MEDICINE | Facility: CLINIC | Age: 48
End: 2018-01-30

## 2018-01-30 NOTE — TELEPHONE ENCOUNTER
----- Message from Carlyle Gordillo MD sent at 1/25/2018  8:45 AM CST -----  Please let patient know that labs look good.    Thanks,  Dr. Gordillo

## 2018-01-31 ENCOUNTER — OFFICE VISIT (OUTPATIENT)
Dept: FAMILY MEDICINE | Facility: CLINIC | Age: 48
End: 2018-01-31
Payer: MEDICARE

## 2018-01-31 VITALS
TEMPERATURE: 98 F | HEART RATE: 73 BPM | DIASTOLIC BLOOD PRESSURE: 90 MMHG | OXYGEN SATURATION: 98 % | RESPIRATION RATE: 20 BRPM | WEIGHT: 293 LBS | BODY MASS INDEX: 60.17 KG/M2 | SYSTOLIC BLOOD PRESSURE: 142 MMHG

## 2018-01-31 DIAGNOSIS — J32.1 CHRONIC FRONTAL SINUSITIS: Primary | ICD-10-CM

## 2018-01-31 PROCEDURE — 99999 PR PBB SHADOW E&M-EST. PATIENT-LVL III: CPT | Mod: PBBFAC,,, | Performed by: FAMILY MEDICINE

## 2018-01-31 PROCEDURE — 99499 UNLISTED E&M SERVICE: CPT | Mod: S$PBB,,, | Performed by: FAMILY MEDICINE

## 2018-01-31 PROCEDURE — S0119 ONDANSETRON 4 MG: HCPCS | Mod: S$GLB,,, | Performed by: FAMILY MEDICINE

## 2018-01-31 PROCEDURE — 99214 OFFICE O/P EST MOD 30 MIN: CPT | Mod: S$GLB,,, | Performed by: FAMILY MEDICINE

## 2018-01-31 PROCEDURE — 3008F BODY MASS INDEX DOCD: CPT | Mod: S$GLB,,, | Performed by: FAMILY MEDICINE

## 2018-01-31 RX ORDER — ONDANSETRON 4 MG/1
4 TABLET, ORALLY DISINTEGRATING ORAL
Status: COMPLETED | OUTPATIENT
Start: 2018-01-31 | End: 2018-01-31

## 2018-01-31 RX ORDER — CEFDINIR 300 MG/1
300 CAPSULE ORAL 2 TIMES DAILY
Qty: 20 CAPSULE | Refills: 0 | Status: SHIPPED | OUTPATIENT
Start: 2018-01-31 | End: 2018-02-10

## 2018-01-31 RX ADMIN — ONDANSETRON 4 MG: 4 TABLET, ORALLY DISINTEGRATING ORAL at 09:01

## 2018-01-31 NOTE — PROGRESS NOTES
Routine Office Visit    Patient Name: Yanni Kaiser    : 1970  MRN: [id number]    Subjective:  Yanni is a 47 y.o. female who presents today for:    1. Sinus pressure and cough  Patient presenting today with exacerbation of chronic sinusitis.  Patient suffers from asthma and smokes regularly.  She has been having these symptoms for several days now and does have some fatigue associated.  There has been no fevers or chills.  No muscle aches.  She states that the fatigue comes and goes.      Past Medical History  Past Medical History:   Diagnosis Date    Allergy     Anemia     Asthma     Depression     Diabetes mellitus, type 2     Diabetes with neurologic complications     GERD (gastroesophageal reflux disease)     High cholesterol     Hypertension     Pseudotumor cerebri     Seizures     Sleep apnea     Type 2 diabetes mellitus        Past Surgical History  Past Surgical History:   Procedure Laterality Date     SECTION      CHOLECYSTECTOMY      SINUS SURGERY         Family History  Family History   Problem Relation Age of Onset    Cancer Mother     Hypertension Mother     Diabetes Mother     Stroke Father     Heart disease Father     COPD Father     Heart attack Father     Cancer Maternal Grandmother        Social History  Social History     Social History    Marital status:      Spouse name: N/A    Number of children: N/A    Years of education: N/A     Occupational History    Not on file.     Social History Main Topics    Smoking status: Current Every Day Smoker     Packs/day: 1.00     Years: 15.00     Types: Cigarettes     Last attempt to quit: 6/10/2015    Smokeless tobacco: Never Used    Alcohol use No    Drug use: No    Sexual activity: Yes     Partners: Male     Birth control/ protection: None     Other Topics Concern    Not on file     Social History Narrative    No narrative on file       Current Medications  Current Outpatient Prescriptions on  File Prior to Visit   Medication Sig Dispense Refill    acetaZOLAMIDE (DIAMOX) 500 mg CpSR Take 1 capsule (500 mg total) by mouth 2 (two) times daily. 60 capsule 2    albuterol 90 mcg/actuation inhaler Inhale 2 puffs into the lungs every 6 (six) hours as needed for Wheezing. Rescue 1 Inhaler 0    albuterol-ipratropium 2.5mg-0.5mg/3mL (DUO-NEB) 0.5 mg-3 mg(2.5 mg base)/3 mL nebulizer solution Take 3 mLs by nebulization every 6 (six) hours as needed for Wheezing or Shortness of Breath. Rescue 100 mL 0    blood sugar diagnostic Strp 1 each by Misc.(Non-Drug; Combo Route) route 4 (four) times daily before meals and nightly. ACCU CHEK ELVIA PLUS METER 200 each 3    blood-glucose meter (ACCU-CHEK ELVIA PLUS METER) Misc TEST FOUR TIMES DAILY BEFORE MEALS AND EVERY NIGHT 90 each 1    budesonide-formoterol 160-4.5 mcg (SYMBICORT) 160-4.5 mcg/actuation HFAA Inhale 2 puffs into the lungs every 12 (twelve) hours. Controller 1 Inhaler 5    buPROPion (WELLBUTRIN XL) 150 MG TB24 tablet Take 1 tablet (150 mg total) by mouth once daily. 30 tablet 0    capsaicin 0.1 % Crea Apply 1 application topically 2 (two) times daily. 56.6 g 1    cetirizine (ZYRTEC) 10 MG tablet Take 1 tablet (10 mg total) by mouth 2 (two) times daily. 60 tablet 3    gabapentin (NEURONTIN) 300 MG capsule Take 2 capsules (600 mg total) by mouth every evening. 180 capsule 11    hydrOXYzine (ATARAX) 50 MG tablet Take 1-4 at night for itching. 120 tablet 3    lancets (ACCU-CHEK SOFTCLIX LANCETS) Misc 1 Device by Misc.(Non-Drug; Combo Route) route 4 (four) times daily. 200 each 3    lancets 25 gauge Misc 1 lancet by Misc.(Non-Drug; Combo Route) route 4 (four) times daily before meals and nightly. 200 each 11    LINZESS 290 mcg Cap TK ONE CAPSULE PO D ON AN EMPTY STOMACH  5    losartan (COZAAR) 100 MG tablet Take 1 tablet (100 mg total) by mouth once daily. 90 tablet 3    metformin (GLUCOPHAGE-XR) 500 MG 24 hr tablet Take 2 tablets (1,000 mg total) by  mouth 2 (two) times daily with meals. 360 tablet 0    mometasone (NASONEX) 50 mcg/actuation nasal spray 2 sprays by Nasal route once daily. 1 each 11    mometasone 0.1% (ELOCON) 0.1 % cream       NOVOLOG FLEXPEN 100 unit/mL InPn pen ADM 15 UNITS SC TID B MEALS  11    oxyCODONE-acetaminophen (PERCOCET)  mg per tablet       pantoprazole (PROTONIX) 40 MG tablet Take 40 mg by mouth once daily.      propranolol (INDERAL) 20 MG tablet Take 1 tablet (20 mg total) by mouth 2 (two) times daily. 60 tablet 0    quetiapine (SEROQUEL) 25 MG Tab Take 1 tablet (25 mg total) by mouth every evening. 30 tablet 0    TAZTIA  mg CpSR TK ONE CAPSULE PO D  2    tiZANidine (ZANAFLEX) 4 MG tablet       topiramate (TOPAMAX) 100 MG tablet Take 3 tablets (300 mg total) by mouth 2 (two) times daily. Please start taking 2 tablet (200 mg) twice a day for the next week. Then increase to additional tablet in evenings (3 tablets at night and 2 in morning) for 1 week. Then increase to 3 tablets in morning and 3 tablets in evening. 90 tablet 11    triamcinolone acetonide 0.1% (KENALOG) 0.1 % ointment       venlafaxine (EFFEXOR-XR) 75 MG 24 hr capsule TAKE ONE CAPSULE BY MOUTH EVERY EVENING 90 capsule 0    zonisamide (ZONEGRAN) 50 MG Cap        No current facility-administered medications on file prior to visit.        Allergies   Review of patient's allergies indicates:  No Known Allergies    Review of Systems (Pertinent positives)  Review of Systems   Constitutional: Positive for malaise/fatigue.   HENT: Positive for congestion and sinus pain.    Eyes: Negative.    Respiratory: Negative.    Cardiovascular: Negative.    Gastrointestinal: Negative.    Musculoskeletal: Negative.    Skin: Negative.          BP (!) 142/90 (BP Location: Right arm, Patient Position: Sitting, BP Method: X-Large (Manual))   Pulse 73   Temp 98.2 °F (36.8 °C) (Oral)   Resp 20   Wt (!) 159 kg (350 lb 8.5 oz)   SpO2 98%   BMI 60.17 kg/m²     GENERAL  APPEARANCE: in no apparent distress and well developed and well nourished  HEENT: PERRL, EOMI, Sclera clear, anicteric, Oropharynx clear, no lesions, Neck supple with midline trachea; pain on palpation of frontal sinuses  NECK: normal, supple, no adenopathy, thyroid normal in size  RESPIRATORY: appears well, vitals normal, no respiratory distress, acyanotic, normal RR, mild wheezing heard throughout lung fields  HEART: regular rate and rhythm, S1, S2 normal, no murmur, click, rub or gallop.    ABDOMEN: abdomen is soft without tenderness, no masses, no hernias, no organomegaly, no rebound, no guarding. Suprapubic tenderness absent. No CVA tenderness.  SKIN: no rashes, no wounds, no other lesions  PSYCH: Alert, oriented x 3, thought content appropriate, speech normal, pleasant and cooperative, good eye contact, well groomed    Assessment/Plan:  Yanni Kaiser is a 47 y.o. female who presents today for :    Yanni was seen today for follow-up.    Diagnoses and all orders for this visit:    Chronic frontal sinusitis  -     cefdinir (OMNICEF) 300 MG capsule; Take 1 capsule (300 mg total) by mouth 2 (two) times daily.  -     ondansetron disintegrating tablet 4 mg; Take 1 tablet (4 mg total) by mouth one time.      1.  Patient is having some nausea in the office today, so will give zofran  2.  omnicef for sinusitis  3.  Follow up as needed  4.  Encouraged smoking cessation    Carlyle Gordillo MD

## 2018-01-31 NOTE — PROGRESS NOTES
(9:35 AM) - Zofran 4 mg ODT tab was given to pt by mouth. Tolerated well. Left office after administration.

## 2018-02-22 ENCOUNTER — OFFICE VISIT (OUTPATIENT)
Dept: NEUROLOGY | Facility: CLINIC | Age: 48
End: 2018-02-22
Payer: MEDICARE

## 2018-02-22 VITALS
WEIGHT: 293 LBS | SYSTOLIC BLOOD PRESSURE: 136 MMHG | HEART RATE: 83 BPM | HEIGHT: 64 IN | DIASTOLIC BLOOD PRESSURE: 92 MMHG | BODY MASS INDEX: 50.02 KG/M2

## 2018-02-22 DIAGNOSIS — G43.009 MIGRAINE WITHOUT AURA AND WITHOUT STATUS MIGRAINOSUS, NOT INTRACTABLE: Primary | Chronic | ICD-10-CM

## 2018-02-22 PROCEDURE — 99499 UNLISTED E&M SERVICE: CPT | Mod: S$PBB,,, | Performed by: PSYCHIATRY & NEUROLOGY

## 2018-02-22 PROCEDURE — 3008F BODY MASS INDEX DOCD: CPT | Mod: S$GLB,,, | Performed by: PSYCHIATRY & NEUROLOGY

## 2018-02-22 PROCEDURE — 99213 OFFICE O/P EST LOW 20 MIN: CPT | Mod: S$GLB,,, | Performed by: PSYCHIATRY & NEUROLOGY

## 2018-02-22 PROCEDURE — 99999 PR PBB SHADOW E&M-EST. PATIENT-LVL III: CPT | Mod: PBBFAC,,, | Performed by: PSYCHIATRY & NEUROLOGY

## 2018-02-22 RX ORDER — MELOXICAM 15 MG/1
15 TABLET ORAL DAILY PRN
Qty: 15 TABLET | Refills: 5 | Status: SHIPPED | OUTPATIENT
Start: 2018-02-22 | End: 2018-03-17 | Stop reason: SDUPTHER

## 2018-02-22 RX ORDER — LANOLIN ALCOHOL/MO/W.PET/CERES
400 CREAM (GRAM) TOPICAL DAILY
Qty: 90 TABLET | Refills: 3 | Status: SHIPPED | OUTPATIENT
Start: 2018-02-22 | End: 2018-08-21 | Stop reason: SDUPTHER

## 2018-02-22 NOTE — PROGRESS NOTES
"[facility] - NEUROLOGY  [facility] [place]    Date: February 22, 2018   Patient Name: Yanni Kaiser   MRN: [id number]   PCP: Carlyle Gordillo  Referring Provider: Self, Aaareferral    Assessment:      This is Yanni Kaiser, 47 y.o. female with morbid obesity, history of IIH, and seizures.  LP 1/2017 with OP 14 and 3/2016 with OP 24, optho exam without evidence of ongoing increased ICP.  IIH does not appear to be an active problem despite ongoing  headache which has a medication over use component with underlying migraines.  Her excessive sedation is likely due in part to SHARATH which is untreated, currently following with sleep, but her medication regiment is unnecessarily extensive.  She had a non-diagnostic EMU admit 7/2017.    Plan:      Mobic and zanaflex prn  Stop GBP, may be worsening edema    Follow up 6 months       I discussed side effects of the medications. I asked the patient to  stop the medication if she notices serious adverse effects as we discussed and to seek immediate medical attention at an ER.     Riccardo Moore MD  [facility]   Department of Neurology    Subjective:   Presents today due to left periorbital headache with +photo/phonophobia and nausea beginning two days ago, has taken tylenol without relief.  Reports this will happen about once a month.    5/2017  ED presentation for syncopal episode 4/2017, admit for DKA 2/2017 which she attributes to steroids for asthma exacerbation  Reports staring spell about every two weeks, unsure when last convulsion was  Continues with headaches "every other day", takes daily tylenol and excedrine migraine about twice a week  Unsure what medication has been most helpful for headache    2/2017  Baseline 5/10 headache with L>R shooting pains and photophobia.  Currently taking diamox, TPM, GBP, LTG although not sure of doses.  Endorses poor sleep due to frequent awakenings to urinate  Reports initial " seizure was GTC on [place] day 2002.  Reports GTC typically occur from sleep but none in over a year.  Reports staring spells but does not know how frequent they.  Previously on keppra.    PAST MEDICAL HISTORY:  Past Medical History:   Diagnosis Date    Allergy     Anemia     Asthma     Depression     Diabetes mellitus, type 2     Diabetes with neurologic complications     GERD (gastroesophageal reflux disease)     High cholesterol     Hypertension     Pseudotumor cerebri     Seizures     Sleep apnea     Type 2 diabetes mellitus        PAST SURGICAL HISTORY:  Past Surgical History:   Procedure Laterality Date     SECTION      CHOLECYSTECTOMY      SINUS SURGERY         CURRENT MEDS:  Current Outpatient Prescriptions   Medication Sig Dispense Refill    acetaZOLAMIDE (DIAMOX) 500 mg CpSR Take 1 capsule (500 mg total) by mouth 2 (two) times daily. 60 capsule 2    albuterol 90 mcg/actuation inhaler Inhale 2 puffs into the lungs every 6 (six) hours as needed for Wheezing. Rescue 1 Inhaler 0    albuterol-ipratropium 2.5mg-0.5mg/3mL (DUO-NEB) 0.5 mg-3 mg(2.5 mg base)/3 mL nebulizer solution Take 3 mLs by nebulization every 6 (six) hours as needed for Wheezing or Shortness of Breath. Rescue 100 mL 0    blood sugar diagnostic Strp 1 each by Misc.(Non-Drug; Combo Route) route 4 (four) times daily before meals and nightly. ACCU CHEK ELVIA PLUS METER 200 each 3    blood-glucose meter (ACCU-CHEK ELVIA PLUS METER) Misc TEST FOUR TIMES DAILY BEFORE MEALS AND EVERY NIGHT 90 each 1    budesonide-formoterol 160-4.5 mcg (SYMBICORT) 160-4.5 mcg/actuation HFAA Inhale 2 puffs into the lungs every 12 (twelve) hours. Controller 1 Inhaler 5    buPROPion (WELLBUTRIN XL) 150 MG TB24 tablet Take 1 tablet (150 mg total) by mouth once daily. 30 tablet 0    capsaicin 0.1 % Crea Apply 1 application topically 2 (two) times daily. 56.6 g 1    cetirizine (ZYRTEC) 10 MG tablet Take 1 tablet (10 mg total) by mouth 2  (two) times daily. 60 tablet 3    gabapentin (NEURONTIN) 300 MG capsule Take 2 capsules (600 mg total) by mouth every evening. 180 capsule 11    hydrOXYzine (ATARAX) 50 MG tablet Take 1-4 at night for itching. 120 tablet 3    lancets (ACCU-CHEK SOFTCLIX LANCETS) Misc 1 Device by Misc.(Non-Drug; Combo Route) route 4 (four) times daily. 200 each 3    lancets 25 gauge Misc 1 lancet by Misc.(Non-Drug; Combo Route) route 4 (four) times daily before meals and nightly. 200 each 11    LINZESS 290 mcg Cap TK ONE CAPSULE PO D ON AN EMPTY STOMACH  5    losartan (COZAAR) 100 MG tablet Take 1 tablet (100 mg total) by mouth once daily. 90 tablet 3    metformin (GLUCOPHAGE-XR) 500 MG 24 hr tablet Take 2 tablets (1,000 mg total) by mouth 2 (two) times daily with meals. 360 tablet 0    mometasone (NASONEX) 50 mcg/actuation nasal spray 2 sprays by Nasal route once daily. 1 each 11    mometasone 0.1% (ELOCON) 0.1 % cream       NOVOLOG FLEXPEN 100 unit/mL InPn pen ADM 15 UNITS SC TID B MEALS  11    oxyCODONE-acetaminophen (PERCOCET)  mg per tablet       pantoprazole (PROTONIX) 40 MG tablet Take 40 mg by mouth once daily.      propranolol (INDERAL) 20 MG tablet Take 1 tablet (20 mg total) by mouth 2 (two) times daily. 60 tablet 0    quetiapine (SEROQUEL) 25 MG Tab Take 1 tablet (25 mg total) by mouth every evening. 30 tablet 0    TAZTIA  mg CpSR TK ONE CAPSULE PO D  2    tiZANidine (ZANAFLEX) 4 MG tablet       topiramate (TOPAMAX) 100 MG tablet Take 3 tablets (300 mg total) by mouth 2 (two) times daily. Please start taking 2 tablet (200 mg) twice a day for the next week. Then increase to additional tablet in evenings (3 tablets at night and 2 in morning) for 1 week. Then increase to 3 tablets in morning and 3 tablets in evening. 90 tablet 11    triamcinolone acetonide 0.1% (KENALOG) 0.1 % ointment       venlafaxine (EFFEXOR-XR) 75 MG 24 hr capsule TAKE ONE CAPSULE BY MOUTH EVERY EVENING 90 capsule 0     "zonisamide (ZONEGRAN) 50 MG Cap        No current facility-administered medications for this visit.        ALLERGIES:  Review of patient's allergies indicates:  No Known Allergies    FAMILY HISTORY:  Family History   Problem Relation Age of Onset    Cancer Mother     Hypertension Mother     Diabetes Mother     Stroke Father     Heart disease Father     COPD Father     Heart attack Father     Cancer Maternal Grandmother        SOCIAL HISTORY:  Social History   Substance Use Topics    Smoking status: Current Every Day Smoker     Packs/day: 1.00     Years: 15.00     Types: Cigarettes     Last attempt to quit: 6/10/2015    Smokeless tobacco: Never Used    Alcohol use No       Review of Systems:  12 review of systems is negative except for the symptoms mentioned in HPI.        Objective:     Vitals:    02/22/18 0823   BP: (!) 136/92   Pulse: 83   Weight: (!) 162 kg (357 lb 2.3 oz)   Height: 5' 4" (1.626 m)       General: NAD, well nourished   Eyes: no tearing, discharge, no erythema   ENT: moist mucous membranes of the oral cavity, nares patent    Neck: Supple, full range of motion  Cardiovascular: Warm and well perfused, pulses equal and symmetrical  Lungs: Normal work of breathing, normal chest wall excursions  Skin: No rash, lesions, or breakdown on exposed skin  Psychiatry: frequently closes eyes, appears very apathetic   Abdomen: soft, non tender, non distended  Extremeties: No cyanosis, clubbing or edema.    Neurological   MENTAL STATUS: Alert and oriented to person, place, and time. Attention and concentration within normal limits. Speech without dysarthria, able to name and repeat without difficulty. Recent and remote memory within normal limits   CRANIAL NERVES: Visual fields intact. PERRL, unable to visualize fundus. EOMI. Facial sensation intact. Face symmetrical. Hearing grossly intact. Full shoulder shrug bilaterally. Tongue protrudes midline.    SENSORY: Sensation is intact to light touch " throughout.  MOTOR: Normal bulk and tone. No pronator drift.  Give-away weakness throughout but 5/5 deltoid, biceps, triceps, interosseous, hand  bilaterally. 5/5 iliopsoas, knee extension/flexion, foot dorsi/plantarflexion bilaterally.  Variable postural tremor of bilateral upper extremities.  CEREBELLAR/COORDINATION/GAIT: Gait steady with normal arm swing and stride length.

## 2018-02-28 ENCOUNTER — OFFICE VISIT (OUTPATIENT)
Dept: FAMILY MEDICINE | Facility: CLINIC | Age: 48
End: 2018-02-28
Payer: MEDICARE

## 2018-02-28 VITALS
BODY MASS INDEX: 50.02 KG/M2 | RESPIRATION RATE: 12 BRPM | OXYGEN SATURATION: 98 % | TEMPERATURE: 98 F | SYSTOLIC BLOOD PRESSURE: 126 MMHG | HEART RATE: 63 BPM | HEIGHT: 64 IN | DIASTOLIC BLOOD PRESSURE: 84 MMHG | WEIGHT: 293 LBS

## 2018-02-28 DIAGNOSIS — R60.0 BILATERAL LEG EDEMA: Primary | ICD-10-CM

## 2018-02-28 PROCEDURE — 99214 OFFICE O/P EST MOD 30 MIN: CPT | Mod: S$GLB,,, | Performed by: FAMILY MEDICINE

## 2018-02-28 PROCEDURE — 99999 PR PBB SHADOW E&M-EST. PATIENT-LVL III: CPT | Mod: PBBFAC,,, | Performed by: FAMILY MEDICINE

## 2018-02-28 RX ORDER — HYDROCHLOROTHIAZIDE 12.5 MG/1
12.5 TABLET ORAL DAILY
Qty: 30 TABLET | Refills: 0 | Status: SHIPPED | OUTPATIENT
Start: 2018-02-28 | End: 2018-03-28

## 2018-02-28 NOTE — PROGRESS NOTES
"Routine Office Visit    Patient Name: Yanni Kaiser    : 1970  MRN: [id number]    Subjective:  Yanni is a 47 y.o. female who presents today for:    1. Fluid  Patient presenting today for excess fluid in hands and feet.  She states that she was seen by neurology for chronic pain and was given mobic.  She states that he told her to follow up with me for a "water pill".  Patient has not had shortness of breath recently.  She does not have a history of CAD, but does have diabetes and is morbidly obese. She states she would like a water pill as her feet are very swollen and barely fit in her shoes.  There hs been no chest pain, blurred vision, or sudden weakness.    Past Medical History  Past Medical History:   Diagnosis Date    Allergy     Anemia     Asthma     Depression     Diabetes mellitus, type 2     Diabetes with neurologic complications     GERD (gastroesophageal reflux disease)     High cholesterol     Hypertension     Pseudotumor cerebri     Seizures     Sleep apnea     Type 2 diabetes mellitus        Past Surgical History  Past Surgical History:   Procedure Laterality Date     SECTION      CHOLECYSTECTOMY      SINUS SURGERY         Family History  Family History   Problem Relation Age of Onset    Cancer Mother     Hypertension Mother     Diabetes Mother     Stroke Father     Heart disease Father     COPD Father     Heart attack Father     Cancer Maternal Grandmother        Social History  Social History     Social History    Marital status:      Spouse name: N/A    Number of children: N/A    Years of education: N/A     Occupational History    Not on file.     Social History Main Topics    Smoking status: Current Every Day Smoker     Packs/day: 1.00     Years: 15.00     Types: Cigarettes     Last attempt to quit: 6/10/2015    Smokeless tobacco: Never Used    Alcohol use No    Drug use: No    Sexual activity: Yes     Partners: Male     Birth control/ " protection: None     Other Topics Concern    Not on file     Social History Narrative    No narrative on file       Current Medications  Current Outpatient Prescriptions on File Prior to Visit   Medication Sig Dispense Refill    acetaZOLAMIDE (DIAMOX) 500 mg CpSR Take 1 capsule (500 mg total) by mouth 2 (two) times daily. 60 capsule 2    albuterol 90 mcg/actuation inhaler Inhale 2 puffs into the lungs every 6 (six) hours as needed for Wheezing. Rescue 1 Inhaler 0    albuterol-ipratropium 2.5mg-0.5mg/3mL (DUO-NEB) 0.5 mg-3 mg(2.5 mg base)/3 mL nebulizer solution Take 3 mLs by nebulization every 6 (six) hours as needed for Wheezing or Shortness of Breath. Rescue 100 mL 0    blood sugar diagnostic Strp 1 each by Misc.(Non-Drug; Combo Route) route 4 (four) times daily before meals and nightly. ACCU CHEK ELVIA PLUS METER 200 each 3    blood-glucose meter (ACCU-CHEK ELVIA PLUS METER) Mis TEST FOUR TIMES DAILY BEFORE MEALS AND EVERY NIGHT 90 each 1    budesonide-formoterol 160-4.5 mcg (SYMBICORT) 160-4.5 mcg/actuation HFAA Inhale 2 puffs into the lungs every 12 (twelve) hours. Controller 1 Inhaler 5    buPROPion (WELLBUTRIN XL) 150 MG TB24 tablet Take 1 tablet (150 mg total) by mouth once daily. 30 tablet 0    capsaicin 0.1 % Crea Apply 1 application topically 2 (two) times daily. 56.6 g 1    cetirizine (ZYRTEC) 10 MG tablet Take 1 tablet (10 mg total) by mouth 2 (two) times daily. 60 tablet 3    hydrOXYzine (ATARAX) 50 MG tablet Take 1-4 at night for itching. 120 tablet 3    lancets (ACCU-CHEK SOFTCLIX LANCETS) Misc 1 Device by Misc.(Non-Drug; Combo Route) route 4 (four) times daily. 200 each 3    lancets 25 gauge Misc 1 lancet by Misc.(Non-Drug; Combo Route) route 4 (four) times daily before meals and nightly. 200 each 11    LINZESS 290 mcg Cap TK ONE CAPSULE PO D ON AN EMPTY STOMACH  5    magnesium oxide (MAG-OX) 400 mg tablet Take 1 tablet (400 mg total) by mouth once daily. 90 tablet 3    meloxicam  (MOBIC) 15 MG tablet Take 1 tablet (15 mg total) by mouth daily as needed (Headache). 15 tablet 5    metformin (GLUCOPHAGE-XR) 500 MG 24 hr tablet Take 2 tablets (1,000 mg total) by mouth 2 (two) times daily with meals. 360 tablet 0    mometasone (NASONEX) 50 mcg/actuation nasal spray 2 sprays by Nasal route once daily. 1 each 11    mometasone 0.1% (ELOCON) 0.1 % cream       NOVOLOG FLEXPEN 100 unit/mL InPn pen ADM 15 UNITS SC TID B MEALS  11    oxyCODONE-acetaminophen (PERCOCET)  mg per tablet       pantoprazole (PROTONIX) 40 MG tablet Take 40 mg by mouth once daily.      propranolol (INDERAL) 20 MG tablet Take 1 tablet (20 mg total) by mouth 2 (two) times daily. 60 tablet 0    quetiapine (SEROQUEL) 25 MG Tab Take 1 tablet (25 mg total) by mouth every evening. 30 tablet 0    TAZTIA  mg CpSR TK ONE CAPSULE PO D  2    tiZANidine (ZANAFLEX) 4 MG tablet       topiramate (TOPAMAX) 100 MG tablet Take 3 tablets (300 mg total) by mouth 2 (two) times daily. Please start taking 2 tablet (200 mg) twice a day for the next week. Then increase to additional tablet in evenings (3 tablets at night and 2 in morning) for 1 week. Then increase to 3 tablets in morning and 3 tablets in evening. 90 tablet 11    triamcinolone acetonide 0.1% (KENALOG) 0.1 % ointment       venlafaxine (EFFEXOR-XR) 75 MG 24 hr capsule TAKE ONE CAPSULE BY MOUTH EVERY EVENING 90 capsule 0    zonisamide (ZONEGRAN) 50 MG Cap       losartan (COZAAR) 100 MG tablet Take 1 tablet (100 mg total) by mouth once daily. 90 tablet 3     No current facility-administered medications on file prior to visit.        Allergies   Review of patient's allergies indicates:  No Known Allergies    Review of Systems (Pertinent positives)  Review of Systems   Constitutional: Negative.    HENT: Negative.    Respiratory: Negative.    Cardiovascular: Positive for leg swelling.   Gastrointestinal: Negative.    Genitourinary: Negative.    Musculoskeletal:  "Positive for back pain and myalgias.   Skin: Negative.    Neurological: Negative.          /84 (BP Location: Right arm, Patient Position: Sitting, BP Method: Medium (Manual))   Pulse 63   Temp 98 °F (36.7 °C) (Oral)   Resp 12   Ht 5' 4" (1.626 m)   Wt (!) 162 kg (357 lb 2.3 oz)   SpO2 98%   BMI 61.30 kg/m²     GENERAL APPEARANCE: in no apparent distress and well developed and well nourished  HEENT: PERRL, EOMI, Sclera clear, anicteric, Oropharynx clear, no lesions, Neck supple with midline trachea  NECK: normal, supple, no adenopathy, thyroid normal in size  RESPIRATORY: appears well, vitals normal, no respiratory distress, acyanotic, normal RR, chest clear, no wheezing, crepitations, rhonchi, normal symmetric air entry  HEART: regular rate and rhythm, S1, S2 normal, no murmur, click, rub or gallop.    ABDOMEN: abdomen is soft without tenderness, no masses, no hernias, no organomegaly, no rebound, no guarding. Suprapubic tenderness absent. No CVA tenderness.  NEUROLOGIC: normal without focal findings, CN II-XII are intact.    Extremities: warm/well perfused.  No abnormal hair patterns.  No clubbing, cyanosis; 2+ edema in bilateral lower extremities  SKIN: no rashes, no wounds, no other lesions  PSYCH: Alert, oriented x 3, thought content appropriate, speech normal, pleasant and cooperative, good eye contact, well groomed    Assessment/Plan:  Yanni Kaiser is a 47 y.o. female who presents today for :    Yanni was seen today for leg swelling, cough, vaginal pain and back pain.    Diagnoses and all orders for this visit:    Bilateral leg edema  -     hydroCHLOROthiazide (HYDRODIURIL) 12.5 MG Tab; Take 1 tablet (12.5 mg total) by mouth once daily.      1.  hctz for swelling  2.  Follow up in 4 weeks for monitoring of kidney function  3.  Encouraged weight loss and she would like medication for this.  Will need to consider options given her extensive medication regimen  4.  She is to call with any " concerns      Carlyle Gordillo MD

## 2018-03-07 ENCOUNTER — OFFICE VISIT (OUTPATIENT)
Dept: PODIATRY | Facility: CLINIC | Age: 48
End: 2018-03-07
Payer: MEDICARE

## 2018-03-07 VITALS
WEIGHT: 293 LBS | SYSTOLIC BLOOD PRESSURE: 116 MMHG | BODY MASS INDEX: 50.02 KG/M2 | HEIGHT: 64 IN | DIASTOLIC BLOOD PRESSURE: 78 MMHG

## 2018-03-07 DIAGNOSIS — R20.2 PARESTHESIAS: ICD-10-CM

## 2018-03-07 DIAGNOSIS — M20.5X2 HALLUX LIMITUS, ACQUIRED, LEFT: ICD-10-CM

## 2018-03-07 DIAGNOSIS — G89.29 CHRONIC HEEL PAIN, RIGHT: ICD-10-CM

## 2018-03-07 DIAGNOSIS — M21.40 PES PLANUS, UNSPECIFIED LATERALITY: ICD-10-CM

## 2018-03-07 DIAGNOSIS — M20.5X1 HALLUX LIMITUS, ACQUIRED, RIGHT: ICD-10-CM

## 2018-03-07 DIAGNOSIS — E11.49 TYPE II DIABETES MELLITUS WITH NEUROLOGICAL MANIFESTATIONS: Primary | ICD-10-CM

## 2018-03-07 DIAGNOSIS — M72.2 PLANTAR FASCIITIS: ICD-10-CM

## 2018-03-07 DIAGNOSIS — M79.671 CHRONIC HEEL PAIN, RIGHT: ICD-10-CM

## 2018-03-07 DIAGNOSIS — M20.42 HAMMER TOES OF BOTH FEET: ICD-10-CM

## 2018-03-07 DIAGNOSIS — M20.41 HAMMER TOES OF BOTH FEET: ICD-10-CM

## 2018-03-07 DIAGNOSIS — R09.89 DECREASED PEDAL PULSES: ICD-10-CM

## 2018-03-07 PROCEDURE — 99999 PR PBB SHADOW E&M-EST. PATIENT-LVL III: CPT | Mod: PBBFAC,,, | Performed by: PODIATRIST

## 2018-03-07 PROCEDURE — 3074F SYST BP LT 130 MM HG: CPT | Mod: CPTII,S$GLB,, | Performed by: PODIATRIST

## 2018-03-07 PROCEDURE — 99214 OFFICE O/P EST MOD 30 MIN: CPT | Mod: S$GLB,,, | Performed by: PODIATRIST

## 2018-03-07 PROCEDURE — 3078F DIAST BP <80 MM HG: CPT | Mod: CPTII,S$GLB,, | Performed by: PODIATRIST

## 2018-03-07 PROCEDURE — 99499 UNLISTED E&M SERVICE: CPT | Mod: S$GLB,,, | Performed by: PODIATRIST

## 2018-03-07 NOTE — PATIENT INSTRUCTIONS
Recommend lotions: eucerin, eucerin for diabetics, aquaphor, A&D ointment, gold bond for diabetics, sween, [place]'s Bees all purpose baby ointment,  urea 40 with aloe (found on amazon.com)    Shoe recommendations: (try 6pm.com, zappos.com , nordstromrack.Downtyme, or shoes.Downtyme for discounted prices) you can visit [employer]SW shoes in [place]  or [facility] in the [facility] (there are also several shoe brand outlets in the [facility])    Asics (GT 2000 or gel foundations), new balance stability type shoes, saucony (stabil c3),  Gray (GTS or Beast or transcend), vionic, propet (tennis shoe)    sofft brand, clarks, crocs, aerosoles, naturalizers, SAS, ecco, born, donna lwe, rockports (dress shoes)    Vionic, burkenstocks, fitflops, propet (sandals)  Nike comfort thong sandals, crocs, propet (house shoes)    Nail Home remedy:  Vicks Vapor rub to nails for easier managability    Occasional soaks for 15-20 mins in luke warm water with 1 cup of listerine and 1 cup of apple cider vinegar are ok You may add several drops of oil of oregano or tea tree oil as well        Diabetes: Inspecting Your Feet  Diabetes increases your chances of developing foot problems. So inspect your feet every day. This helps you find small skin irritations before they become serious infections. If you have trouble seeing the bottoms of your feet, use a mirror or ask a family member or friend to help.     Pressure spots on the bottom of the foot are common areas where problems develop.   How to check your feet  Below are tips to help you look for foot problems. Try to check your feet at the same time each day, such as when you get out of bed in the morning:  · Check the top of each foot. The tops of toes, back of the heel, and outer edge of the foot can get a lot of rubbing from poor-fitting shoes.  · Check the bottom of each foot. Daily wear and tear often leads to problems at pressure spots.  · Check the toes and nails. Fungal infections often occur  between toes. Toenail problems can also be a sign of fungal infections or lead to breaks in the skin.  · Check your shoes, too. Loose objects inside a shoe can injure the foot. Use your hand to feel inside your shoes for things like ayana, loose stitching, or rough areas that could irritate your skin.  Warning signs  Look for any color changes in the foot. Redness with streaks can signal a severe infection, which needs immediate medical attention. Tell your doctor right away if you have any of these problems:  · Swelling, sometimes with color changes, may be a sign of poor blood flow or infection. Symptoms include tenderness and an increase in the size of your foot.  · Warm or hot areas on your feet may be signs of infection. A foot that is cold may not be getting enough blood.  · Sensations such as burning, tingling, or pins and needles can be signs of a problem. Also check for areas that may be numb.  · Hot spots are caused by friction or pressure. Look for hot spots in areas that get a lot of rubbing. Hot spots can turn into blisters, calluses, or sores.  · Cracks and sores are caused by dry or irritated skin. They are a sign that the skin is breaking down, which can lead to infection.  · Toenail problems to watch for include nails growing into the skin (ingrown toenail) and causing redness or pain. Thick, yellow, or discolored nails can signal a fungal infection.  · Drainage and odor can develop from untreated sores and ulcers. Call your doctor right away if you notice white or yellow drainage, bleeding, or unpleasant odor.   © 3349-6443 [employer]. [address] [place] [address]. All rights reserved. This information is not intended as a substitute for professional medical care. Always follow your healthcare professional's instructions.        Step-by-Step:  Inspecting Your Feet (Diabetes)    Date Last Reviewed: 10/1/2016  © 9512-9270 [employer]. [address] [place] [address]  Road, [place]IKA [place] [address]. All rights reserved. This information is not intended as a substitute for professional medical care. Always follow your healthcare professional's instructions.

## 2018-03-07 NOTE — PROGRESS NOTES
Subjective:      Patient ID: Yanni Kaiser is a 47 y.o. female.    Chief Complaint: Diabetes Mellitus (pcp Dr. Gordillo 2-28-18); Diabetic Foot Exam; and Nail Care    Yanni Kaiser is a 47 y.o. femalewho presents to the clinic for evaluation and treatment of high risk feet. Yanni Kaiser   has a past medical history of Allergy; Anemia; Asthma; Depression; Diabetes mellitus, type 2; Diabetes with neurologic complications; GERD (gastroesophageal reflux disease); High cholesterol; Hypertension; Pseudotumor cerebri; Seizures; Sleep apnea; and Type 2 diabetes mellitus.   The patient's chief complaint is the feeling of swelling to the feet and continuedright heel pain.     PCP: Carlyle Gordillo MD    Date Last Seen by PCP:   Chief Complaint   Patient presents with    Diabetes Mellitus     pcp Dr. Gordillo 2-28-18    Diabetic Foot Exam    Nail Care       Current shoe gear:   champions boat shoes with inserts     This patient has documented high risk feet requiring routine maintenance secondary to diabetes mellitis and those secondary complications of diabetes, as mentioned..      Hemoglobin A1C   Date Value Ref Range Status   01/22/2018 5.8 (H) 4.0 - 5.6 % Final     Comment:     According to ADA guidelines, hemoglobin A1c <7.0% represents  optimal control in non-pregnant diabetic patients. Different  metrics may apply to specific patient populations.   Standards of Medical Care in Diabetes-2016.  For the purpose of screening for the presence of diabetes:  <5.7%     Consistent with the absence of diabetes  5.7-6.4%  Consistent with increasing risk for diabetes   (prediabetes)  >or=6.5%  Consistent with diabetes  Currently, no consensus exists for use of hemoglobin A1c  for diagnosis of diabetes for children.  This Hemoglobin A1c assay has significant interference with fetal   hemoglobin   (HbF). The results are invalid for patients with abnormal amounts of   HbF,   including those with known Hereditary  Persistence   of Fetal Hemoglobin. Heterozygous hemoglobin variants (HbAS, HbAC,   HbAD, HbAE, HbA2) do not significantly interfere with this assay;   however, presence of multiple variants in a sample may impact the %   interference.     06/26/2017 6.0 (H) 4.0 - 5.6 % Final     Comment:     According to ADA guidelines, hemoglobin A1c <7.0% represents  optimal control in non-pregnant diabetic patients. Different  metrics may apply to specific patient populations.   Standards of Medical Care in Diabetes-2016.  For the purpose of screening for the presence of diabetes:  <5.7%     Consistent with the absence of diabetes  5.7-6.4%  Consistent with increasing risk for diabetes   (prediabetes)  >or=6.5%  Consistent with diabetes  Currently, no consensus exists for use of hemoglobin A1c  for diagnosis of diabetes for children.  This Hemoglobin A1c assay has significant interference with fetal   hemoglobin   (HbF). The results are invalid for patients with abnormal amounts of   HbF,   including those with known Hereditary Persistence   of Fetal Hemoglobin. Heterozygous hemoglobin variants (HbAS, HbAC,   HbAD, HbAE, HbA2) do not significantly interfere with this assay;   however, presence of multiple variants in a sample may impact the %   interference.     02/21/2017 10.3 (H) 4.5 - 6.2 % Final     Comment:     According to ADA guidelines, hemoglobin A1C <7.0% represents  optimal control in non-pregnant diabetic patients.  Different  metrics may apply to specific populations.   Standards of Medical Care in Diabetes - 2016.  For the purpose of screening for the presence of diabetes:  <5.7%     Consistent with the absence of diabetes  5.7-6.4%  Consistent with increasing risk for diabetes   (prediabetes)  >or=6.5%  Consistent with diabetes  Currently no consensus exists for use of hemoglobin A1C  for diagnosis of diabetes for children.       Patient Active Problem List   Diagnosis    Mild intermittent asthma    SHARATH  (obstructive sleep apnea)    Leg varices    Low back pain    Seizure disorder    Type 2 diabetes mellitus, controlled    Migraine    Morbid obesity with BMI of 60.0-69.9, adult    Tobacco abuse    Exacerbation of asthma    Anemia of chronic disease    Mild protein malnutrition    Weakness    Allergic rhinitis    Idiopathic intracranial hypertension    Essential hypertension    Combined hyperlipidemia associated with type 2 diabetes mellitus    Depression    Hyperlipidemia    GERD (gastroesophageal reflux disease)    Epilepsy    Plantar fasciitis, bilateral    Numbness of right hand    Type 2 diabetes mellitus with stage 3 chronic kidney disease, with long-term current use of insulin    Localz-rltd symptomatic epilepsy w cmplx part sz, intract, wo status    Complex partial epilepsy, intractable    Recurrent major depressive disorder, in full remission    Simple chronic bronchitis     Current Outpatient Prescriptions on File Prior to Visit   Medication Sig Dispense Refill    acetaZOLAMIDE (DIAMOX) 500 mg CpSR Take 1 capsule (500 mg total) by mouth 2 (two) times daily. 60 capsule 2    albuterol 90 mcg/actuation inhaler Inhale 2 puffs into the lungs every 6 (six) hours as needed for Wheezing. Rescue 1 Inhaler 0    albuterol-ipratropium 2.5mg-0.5mg/3mL (DUO-NEB) 0.5 mg-3 mg(2.5 mg base)/3 mL nebulizer solution Take 3 mLs by nebulization every 6 (six) hours as needed for Wheezing or Shortness of Breath. Rescue 100 mL 0    blood sugar diagnostic Strp 1 each by Misc.(Non-Drug; Combo Route) route 4 (four) times daily before meals and nightly. ACCU CHEK ELVIA PLUS METER 200 each 3    blood-glucose meter (ACCU-CHEK ELVIA PLUS METER) Misc TEST FOUR TIMES DAILY BEFORE MEALS AND EVERY NIGHT 90 each 1    budesonide-formoterol 160-4.5 mcg (SYMBICORT) 160-4.5 mcg/actuation HFAA Inhale 2 puffs into the lungs every 12 (twelve) hours. Controller 1 Inhaler 5    buPROPion (WELLBUTRIN XL) 150 MG TB24  tablet Take 1 tablet (150 mg total) by mouth once daily. 30 tablet 0    capsaicin 0.1 % Crea Apply 1 application topically 2 (two) times daily. 56.6 g 1    cetirizine (ZYRTEC) 10 MG tablet Take 1 tablet (10 mg total) by mouth 2 (two) times daily. 60 tablet 3    hydroCHLOROthiazide (HYDRODIURIL) 12.5 MG Tab Take 1 tablet (12.5 mg total) by mouth once daily. 30 tablet 0    hydrOXYzine (ATARAX) 50 MG tablet Take 1-4 at night for itching. 120 tablet 3    lancets (ACCU-CHEK SOFTCLIX LANCETS) Misc 1 Device by Misc.(Non-Drug; Combo Route) route 4 (four) times daily. 200 each 3    lancets 25 gauge Misc 1 lancet by Misc.(Non-Drug; Combo Route) route 4 (four) times daily before meals and nightly. 200 each 11    LINZESS 290 mcg Cap TK ONE CAPSULE PO D ON AN EMPTY STOMACH  5    magnesium oxide (MAG-OX) 400 mg tablet Take 1 tablet (400 mg total) by mouth once daily. 90 tablet 3    meloxicam (MOBIC) 15 MG tablet Take 1 tablet (15 mg total) by mouth daily as needed (Headache). 15 tablet 5    metformin (GLUCOPHAGE-XR) 500 MG 24 hr tablet Take 2 tablets (1,000 mg total) by mouth 2 (two) times daily with meals. 360 tablet 0    mometasone (NASONEX) 50 mcg/actuation nasal spray 2 sprays by Nasal route once daily. 1 each 11    mometasone 0.1% (ELOCON) 0.1 % cream       NOVOLOG FLEXPEN 100 unit/mL InPn pen ADM 15 UNITS SC TID B MEALS  11    oxyCODONE-acetaminophen (PERCOCET)  mg per tablet       pantoprazole (PROTONIX) 40 MG tablet Take 40 mg by mouth once daily.      propranolol (INDERAL) 20 MG tablet Take 1 tablet (20 mg total) by mouth 2 (two) times daily. 60 tablet 0    quetiapine (SEROQUEL) 25 MG Tab Take 1 tablet (25 mg total) by mouth every evening. 30 tablet 0    TAZTIA  mg CpSR TK ONE CAPSULE PO D  2    tiZANidine (ZANAFLEX) 4 MG tablet       topiramate (TOPAMAX) 100 MG tablet Take 3 tablets (300 mg total) by mouth 2 (two) times daily. Please start taking 2 tablet (200 mg) twice a day for the next  week. Then increase to additional tablet in evenings (3 tablets at night and 2 in morning) for 1 week. Then increase to 3 tablets in morning and 3 tablets in evening. 90 tablet 11    triamcinolone acetonide 0.1% (KENALOG) 0.1 % ointment       venlafaxine (EFFEXOR-XR) 75 MG 24 hr capsule TAKE ONE CAPSULE BY MOUTH EVERY EVENING 90 capsule 0    zonisamide (ZONEGRAN) 50 MG Cap       losartan (COZAAR) 100 MG tablet Take 1 tablet (100 mg total) by mouth once daily. 90 tablet 3     No current facility-administered medications on file prior to visit.      Review of patient's allergies indicates:  No Known Allergies  Past Surgical History:   Procedure Laterality Date     SECTION      CHOLECYSTECTOMY      SINUS SURGERY       Family History   Problem Relation Age of Onset    Cancer Mother     Hypertension Mother     Diabetes Mother     Stroke Father     Heart disease Father     COPD Father     Heart attack Father     Cancer Maternal Grandmother      Social History     Social History    Marital status:      Spouse name: N/A    Number of children: N/A    Years of education: N/A     Occupational History    Not on file.     Social History Main Topics    Smoking status: Current Every Day Smoker     Packs/day: 1.00     Years: 15.00     Types: Cigarettes     Last attempt to quit: 6/10/2015    Smokeless tobacco: Never Used    Alcohol use No    Drug use: No    Sexual activity: Yes     Partners: Male     Birth control/ protection: None     Other Topics Concern    Not on file     Social History Narrative    No narrative on file        Review of Systems   Constitution: Negative for chills, fever and weakness.   Cardiovascular: Positive for leg swelling. Negative for chest pain and claudication.   Respiratory: Negative for cough and shortness of breath.    Skin: Positive for dry skin and nail changes. Negative for itching and rash.   Musculoskeletal: Positive for arthritis, joint pain, muscle cramps  "and myalgias. Negative for falls, joint swelling and muscle weakness.   Gastrointestinal: Negative for diarrhea, nausea and vomiting.   Neurological: Positive for numbness and paresthesias. Negative for tremors.   Psychiatric/Behavioral: Negative for altered mental status and hallucinations.           Objective:       Vitals:    03/07/18 0848   BP: 116/78   Weight: (!) 161.9 kg (357 lb)   Height: 5' 4" (1.626 m)   PainSc: 10-Worst pain ever   PainLoc: Foot       Physical Exam   Constitutional:   General: Pt. is well-developed, well-nourished, appears stated age, in no acute distress, alert and oriented x 3. No evidence of depression, anxiety, or agitation. Calm, cooperative, and communicative. Appropriate interactions and affect.       Cardiovascular:   Pulses:       Dorsalis pedis pulses are 1+ on the right side, and 1+ on the left side.        Posterior tibial pulses are 1+ on the right side, and 1+ on the left side.   Dorsalis pedis and posterior tibial pulses are diminished Bilaterally. Toes are cool to touch. Feet are warm proximally.There is decreased digital hair. Skin is atrophic, hyperpigmented, and mildly edematous       Musculoskeletal:        Right ankle: She exhibits decreased range of motion and swelling. No tenderness. No lateral malleolus, no medial malleolus, no CF ligament, no head of 5th metatarsal and no proximal fibula tenderness found. Achilles tendon exhibits no pain and no defect.        Left ankle: She exhibits decreased range of motion and swelling. No tenderness. No lateral malleolus, no medial malleolus, no head of 5th metatarsal and no proximal fibula tenderness found. Achilles tendon exhibits no pain and no defect.        Right foot: There is tenderness (Plantar medial calcaneal tuber. mild pain to palpation of 5th met hase and resistance to eversion). There is normal range of motion.        Left foot: There is tenderness (plantar calcaneal medial tuber. mild pain with palpation of 5th " met base and eversion resistance. ). There is normal range of motion.   Biomechanical exam: Pain on palpation bilateral medial calcaneal tubercle at origin of plantar fascia. There is equinus deformity bilateral with decreased dorsiflexion at the ankle joint bilateral. No tenderness with compression of heel. Negative tinels sign. Gait analysis reveals excessive pronation through midstance and propulsion with early heel off. Shoes reveals lateral heel counter wear bilateral     Muscle strength is 5/5 in all groups bilaterally.    Decreased stride, station of gait.  apropulsive toe off.  Increased angle and base of gait.    Patient has hammertoes of digits 2-5 bilateral partially reducible without symptom today.    Decreased first MPJ range of motion both weightbearing and nonweightbearing, no crepitus observed the first MP joint,+ dorsal flag sign. Mild  bunion deformity is observed .    Decreased arch height noted b/l.    Neurological: A sensory deficit is present.   Nunez-Arjun 5.07 monofilamant testing is diminished Maciej feet. Sharp/dull sensation diminished Bilaterally.     Paresthesias, and hyperesthesia bilateral feet with no clearly identified trigger or source.     Skin: Skin is warm, dry and intact. No abrasion, no ecchymosis, no lesion and no rash noted. She is not diaphoretic. No cyanosis or erythema. No pallor. Nails show no clubbing.   Toenails 1-5 bilaterally neatly trimmed and painted thickened by 2-3 mm,     Interdigital Spaces clean, dry and without evidence of break in skin integrity    Small < 1cm fissure noted to plantar medial R heel. Stable without signs of infection. No erythema or drainage noted.    Psychiatric: She has a normal mood and affect. Her speech is normal.   Nursing note and vitals reviewed.            Assessment:       Encounter Diagnoses   Name Primary?    Type II diabetes mellitus with neurological manifestations Yes    Chronic heel pain, right     Plantar fasciitis      Pes planus, unspecified laterality     Hammer toes of both feet     Hallux limitus, acquired, right     Hallux limitus, acquired, left     Paresthesias     Decreased pedal pulses          Plan:       Yanni was seen today for diabetes mellitus, diabetic foot exam and nail care.    Diagnoses and all orders for this visit:    Type II diabetes mellitus with neurological manifestations  -     US Ankle Brachial Indices Ext LTD WO Str; Future  -     DIABETIC SHOES FOR HOME USE    Chronic heel pain, right  -     US Ankle Brachial Indices Ext LTD WO Str; Future  -     DIABETIC SHOES FOR HOME USE    Plantar fasciitis  -     US Ankle Brachial Indices Ext LTD WO Str; Future  -     DIABETIC SHOES FOR HOME USE    Pes planus, unspecified laterality  -     US Ankle Brachial Indices Ext LTD WO Str; Future  -     DIABETIC SHOES FOR HOME USE    Hammer toes of both feet  -     US Ankle Brachial Indices Ext LTD WO Str; Future  -     DIABETIC SHOES FOR HOME USE    Hallux limitus, acquired, right  -     US Ankle Brachial Indices Ext LTD WO Str; Future  -     DIABETIC SHOES FOR HOME USE    Hallux limitus, acquired, left  -     US Ankle Brachial Indices Ext LTD WO Str; Future  -     DIABETIC SHOES FOR HOME USE    Paresthesias    Decreased pedal pulses  -     US Ankle Brachial Indices Ext LTD WO Str; Future  -     DIABETIC SHOES FOR HOME USE      I counseled the patient on her conditions, their implications and medical management.     - Shoe inspection. Diabetic Foot Education. Patient reminded of the importance of good nutrition and blood sugar control to help prevent podiatric complications of diabetes. Patient instructed on proper foot hygeine. We discussed wearing proper shoe gear, daily foot inspections, never walking without protective shoe gear, never putting sharp instruments to feet.     Discussed different treatment options for foot pain. I gave written and verbal instructions on stretching exercises. Patient expressed  understanding. Discussed icing the affected area as needed and also wearing appropriate shoe gear and avoiding flats, slippers, sandals, and going barefoot. My recommendation for OTC supports is [employer]. Patient instructed on adequate icing techniques. Patient should ice the affected area at least once per day x 10 minutes for 10 days . I advised the patient that extra icing would also be beneficial to ensure adequate anti inflammatory effect. We also discussed cortisone injections and NSAID therapy. She would like to avoid injection today.     Rx diabetic shoes for protection and support    RC to evaluate decreased pedal pulses    RTC in 6-12 months, sooner PRN

## 2018-03-12 ENCOUNTER — HOSPITAL ENCOUNTER (OUTPATIENT)
Dept: RADIOLOGY | Facility: HOSPITAL | Age: 48
Discharge: HOME | End: 2018-03-12
Attending: PODIATRIST
Payer: MEDICARE

## 2018-03-12 DIAGNOSIS — M20.41 HAMMER TOES OF BOTH FEET: ICD-10-CM

## 2018-03-12 DIAGNOSIS — M79.671 CHRONIC HEEL PAIN, RIGHT: ICD-10-CM

## 2018-03-12 DIAGNOSIS — M21.40 PES PLANUS, UNSPECIFIED LATERALITY: ICD-10-CM

## 2018-03-12 DIAGNOSIS — G89.29 CHRONIC HEEL PAIN, RIGHT: ICD-10-CM

## 2018-03-12 DIAGNOSIS — M20.5X2 HALLUX LIMITUS, ACQUIRED, LEFT: ICD-10-CM

## 2018-03-12 DIAGNOSIS — M20.5X1 HALLUX LIMITUS, ACQUIRED, RIGHT: ICD-10-CM

## 2018-03-12 DIAGNOSIS — E11.49 TYPE II DIABETES MELLITUS WITH NEUROLOGICAL MANIFESTATIONS: ICD-10-CM

## 2018-03-12 DIAGNOSIS — R09.89 DECREASED PEDAL PULSES: ICD-10-CM

## 2018-03-12 DIAGNOSIS — M20.42 HAMMER TOES OF BOTH FEET: ICD-10-CM

## 2018-03-12 DIAGNOSIS — M72.2 PLANTAR FASCIITIS: ICD-10-CM

## 2018-03-12 PROCEDURE — 93922 UPR/L XTREMITY ART 2 LEVELS: CPT | Mod: 26,,, | Performed by: RADIOLOGY

## 2018-03-12 PROCEDURE — 93922 UPR/L XTREMITY ART 2 LEVELS: CPT | Mod: TC

## 2018-03-13 ENCOUNTER — TELEPHONE (OUTPATIENT)
Dept: PODIATRY | Facility: CLINIC | Age: 48
End: 2018-03-13

## 2018-03-13 NOTE — TELEPHONE ENCOUNTER
----- Message from Rosalva Taylor DPM sent at 3/12/2018  6:09 PM CDT -----  Please inform patient of RC results:   normal

## 2018-03-15 DIAGNOSIS — E11.9 TYPE 2 DIABETES MELLITUS WITHOUT COMPLICATION: ICD-10-CM

## 2018-03-17 DIAGNOSIS — G43.009 MIGRAINE WITHOUT AURA AND WITHOUT STATUS MIGRAINOSUS, NOT INTRACTABLE: Chronic | ICD-10-CM

## 2018-03-19 RX ORDER — MELOXICAM 15 MG/1
TABLET ORAL
Qty: 90 TABLET | Refills: 1 | Status: SHIPPED | OUTPATIENT
Start: 2018-03-19 | End: 2018-07-30

## 2018-03-26 RX ORDER — LOSARTAN POTASSIUM 100 MG/1
100 TABLET ORAL DAILY
Qty: 90 TABLET | Refills: 0 | Status: SHIPPED | OUTPATIENT
Start: 2018-03-26 | End: 2018-07-02 | Stop reason: SDUPTHER

## 2018-03-28 ENCOUNTER — OFFICE VISIT (OUTPATIENT)
Dept: FAMILY MEDICINE | Facility: CLINIC | Age: 48
End: 2018-03-28
Payer: MEDICARE

## 2018-03-28 VITALS
DIASTOLIC BLOOD PRESSURE: 88 MMHG | OXYGEN SATURATION: 94 % | SYSTOLIC BLOOD PRESSURE: 124 MMHG | HEART RATE: 65 BPM | TEMPERATURE: 98 F | HEIGHT: 64 IN

## 2018-03-28 DIAGNOSIS — R60.0 BILATERAL LEG EDEMA: Primary | ICD-10-CM

## 2018-03-28 PROCEDURE — 99214 OFFICE O/P EST MOD 30 MIN: CPT | Mod: S$GLB,,, | Performed by: FAMILY MEDICINE

## 2018-03-28 PROCEDURE — 3079F DIAST BP 80-89 MM HG: CPT | Mod: CPTII,S$GLB,, | Performed by: FAMILY MEDICINE

## 2018-03-28 PROCEDURE — 99999 PR PBB SHADOW E&M-EST. PATIENT-LVL III: CPT | Mod: PBBFAC,,, | Performed by: FAMILY MEDICINE

## 2018-03-28 PROCEDURE — 3074F SYST BP LT 130 MM HG: CPT | Mod: CPTII,S$GLB,, | Performed by: FAMILY MEDICINE

## 2018-03-28 RX ORDER — HYDROCHLOROTHIAZIDE 25 MG/1
25 TABLET ORAL DAILY
Qty: 30 TABLET | Refills: 11 | Status: SHIPPED | OUTPATIENT
Start: 2018-03-28 | End: 2019-11-15

## 2018-03-28 NOTE — PROGRESS NOTES
Routine Office Visit    Patient Name: Yanni Kaiser    : 1970  MRN: [id number]    Subjective:  Yanni is a 47 y.o. female who presents today for:    1. Swelling  Patient presenting with full body muscle aches and swelling.  She has been taking her hctz, but no increased fluid intake.  She took one of her husbands diuretics and didn't help.  She was told not to do that again.  Patient is morbidly obese and does not stay active.  She states that her swelling is all fluid and is not related to her weight.     Past Medical History  Past Medical History:   Diagnosis Date    Allergy     Anemia     Asthma     Depression     Diabetes mellitus, type 2     Diabetes with neurologic complications     GERD (gastroesophageal reflux disease)     High cholesterol     Hypertension     Pseudotumor cerebri     Seizures     Sleep apnea     Type 2 diabetes mellitus        Past Surgical History  Past Surgical History:   Procedure Laterality Date     SECTION      CHOLECYSTECTOMY      SINUS SURGERY         Family History  Family History   Problem Relation Age of Onset    Cancer Mother     Hypertension Mother     Diabetes Mother     Stroke Father     Heart disease Father     COPD Father     Heart attack Father     Cancer Maternal Grandmother        Social History  Social History     Social History    Marital status:      Spouse name: N/A    Number of children: N/A    Years of education: N/A     Occupational History    Not on file.     Social History Main Topics    Smoking status: Current Every Day Smoker     Packs/day: 1.00     Years: 15.00     Types: Cigarettes     Last attempt to quit: 6/10/2015    Smokeless tobacco: Never Used    Alcohol use No    Drug use: No    Sexual activity: Yes     Partners: Male     Birth control/ protection: None     Other Topics Concern    Not on file     Social History Narrative    No narrative on file       Current Medications  Current Outpatient  Prescriptions on File Prior to Visit   Medication Sig Dispense Refill    acetaZOLAMIDE (DIAMOX) 500 mg CpSR Take 1 capsule (500 mg total) by mouth 2 (two) times daily. 60 capsule 2    albuterol 90 mcg/actuation inhaler Inhale 2 puffs into the lungs every 6 (six) hours as needed for Wheezing. Rescue 1 Inhaler 0    albuterol-ipratropium 2.5mg-0.5mg/3mL (DUO-NEB) 0.5 mg-3 mg(2.5 mg base)/3 mL nebulizer solution Take 3 mLs by nebulization every 6 (six) hours as needed for Wheezing or Shortness of Breath. Rescue 100 mL 0    blood sugar diagnostic Strp 1 each by Misc.(Non-Drug; Combo Route) route 4 (four) times daily before meals and nightly. ACCU CHEK ELVIA PLUS METER 200 each 3    blood-glucose meter (ACCU-CHEK ELVIA PLUS METER) Misc TEST FOUR TIMES DAILY BEFORE MEALS AND EVERY NIGHT 90 each 1    budesonide-formoterol 160-4.5 mcg (SYMBICORT) 160-4.5 mcg/actuation HFAA Inhale 2 puffs into the lungs every 12 (twelve) hours. Controller 1 Inhaler 5    buPROPion (WELLBUTRIN XL) 150 MG TB24 tablet Take 1 tablet (150 mg total) by mouth once daily. 30 tablet 0    capsaicin 0.1 % Crea Apply 1 application topically 2 (two) times daily. 56.6 g 1    cetirizine (ZYRTEC) 10 MG tablet Take 1 tablet (10 mg total) by mouth 2 (two) times daily. 60 tablet 3    hydrOXYzine (ATARAX) 50 MG tablet Take 1-4 at night for itching. 120 tablet 3    lancets (ACCU-CHEK SOFTCLIX LANCETS) Misc 1 Device by Misc.(Non-Drug; Combo Route) route 4 (four) times daily. 200 each 3    lancets 25 gauge Misc 1 lancet by Misc.(Non-Drug; Combo Route) route 4 (four) times daily before meals and nightly. 200 each 11    LINZESS 290 mcg Cap TK ONE CAPSULE PO D ON AN EMPTY STOMACH  5    losartan (COZAAR) 100 MG tablet Take 1 tablet (100 mg total) by mouth once daily. 90 tablet 0    magnesium oxide (MAG-OX) 400 mg tablet Take 1 tablet (400 mg total) by mouth once daily. 90 tablet 3    meloxicam (MOBIC) 15 MG tablet TAKE 1 TABLET(15 MG) BY MOUTH DAILY AS  NEEDED FOR HEADACHE 90 tablet 1    metformin (GLUCOPHAGE-XR) 500 MG 24 hr tablet Take 2 tablets (1,000 mg total) by mouth 2 (two) times daily with meals. 360 tablet 0    mometasone (NASONEX) 50 mcg/actuation nasal spray 2 sprays by Nasal route once daily. 1 each 11    mometasone 0.1% (ELOCON) 0.1 % cream       NOVOLOG FLEXPEN 100 unit/mL InPn pen ADM 15 UNITS SC TID B MEALS  11    oxyCODONE-acetaminophen (PERCOCET)  mg per tablet       pantoprazole (PROTONIX) 40 MG tablet Take 40 mg by mouth once daily.      propranolol (INDERAL) 20 MG tablet Take 1 tablet (20 mg total) by mouth 2 (two) times daily. 60 tablet 0    quetiapine (SEROQUEL) 25 MG Tab Take 1 tablet (25 mg total) by mouth every evening. 30 tablet 0    TAZTIA  mg CpSR TK ONE CAPSULE PO D  2    tiZANidine (ZANAFLEX) 4 MG tablet       topiramate (TOPAMAX) 100 MG tablet Take 3 tablets (300 mg total) by mouth 2 (two) times daily. Please start taking 2 tablet (200 mg) twice a day for the next week. Then increase to additional tablet in evenings (3 tablets at night and 2 in morning) for 1 week. Then increase to 3 tablets in morning and 3 tablets in evening. 90 tablet 11    triamcinolone acetonide 0.1% (KENALOG) 0.1 % ointment       venlafaxine (EFFEXOR-XR) 75 MG 24 hr capsule TAKE ONE CAPSULE BY MOUTH EVERY EVENING 90 capsule 0    zonisamide (ZONEGRAN) 50 MG Cap       [DISCONTINUED] hydroCHLOROthiazide (HYDRODIURIL) 12.5 MG Tab Take 1 tablet (12.5 mg total) by mouth once daily. 30 tablet 0     No current facility-administered medications on file prior to visit.        Allergies   Review of patient's allergies indicates:  No Known Allergies    Review of Systems (Pertinent positives)  Review of Systems   Constitutional: Negative.    HENT: Negative.    Eyes: Negative.    Respiratory: Negative.    Cardiovascular: Positive for leg swelling.   Gastrointestinal: Negative.    Musculoskeletal: Negative.    Skin: Negative.          There were no  vitals taken for this visit.    GENERAL APPEARANCE: in no apparent distress and well developed and well nourished  HEENT: PERRL, EOMI, Sclera clear, anicteric, Oropharynx clear, no lesions, Neck supple with midline trachea  NECK: normal, supple, no adenopathy, thyroid normal in size  RESPIRATORY: appears well, vitals normal, no respiratory distress, acyanotic, normal RR, chest clear, no wheezing, crepitations, rhonchi, normal symmetric air entry  HEART: regular rate and rhythm, S1, S2 normal, no murmur, click, rub or gallop.    ABDOMEN: abdomen is soft without tenderness, no masses, no hernias, no organomegaly, no rebound, no guarding. Suprapubic tenderness absent. No CVA tenderness.  NEUROLOGIC: normal without focal findings, CN II-XII are intact.    Extremities: warm/well perfused.  No abnormal hair patterns. 1+ edema in bilateral lower extremities  SKIN: no rashes, no wounds, no other lesions  PSYCH: Alert, oriented x 3, thought content appropriate, speech normal, pleasant and cooperative, good eye contact, well groomed    Assessment/Plan:  Yanni Kaiser is a 47 y.o. female who presents today for :    Diagnoses and all orders for this visit:    Bilateral leg edema  -     Comprehensive metabolic panel; Future  -     CK; Future  -     hydroCHLOROthiazide (HYDRODIURIL) 25 MG tablet; Take 1 tablet (25 mg total) by mouth once daily.      1.  Labs to be done today  2.  Instructed patient to not use her husbands heart failure diuretics  3.  Very little edema noticed on exam  4.  Encouraged weight loss  5.  Labs to be done in the next week  6.  Follow up as needed    Carlyle Gordillo MD

## 2018-04-02 DIAGNOSIS — G43.009 MIGRAINE WITHOUT AURA AND WITHOUT STATUS MIGRAINOSUS, NOT INTRACTABLE: Chronic | ICD-10-CM

## 2018-04-02 RX ORDER — MELOXICAM 15 MG/1
TABLET ORAL
Qty: 90 TABLET | Refills: 1 | Status: SHIPPED | OUTPATIENT
Start: 2018-04-02 | End: 2020-05-13

## 2018-04-05 ENCOUNTER — LAB VISIT (OUTPATIENT)
Dept: LAB | Facility: HOSPITAL | Age: 48
End: 2018-04-05
Attending: FAMILY MEDICINE
Payer: MEDICARE

## 2018-04-05 DIAGNOSIS — R60.0 BILATERAL LEG EDEMA: ICD-10-CM

## 2018-04-05 LAB
ALBUMIN SERPL BCP-MCNC: 3.4 G/DL
ALP SERPL-CCNC: 67 U/L
ALT SERPL W/O P-5'-P-CCNC: 10 U/L
ANION GAP SERPL CALC-SCNC: 5 MMOL/L
AST SERPL-CCNC: 9 U/L
BILIRUB SERPL-MCNC: 0.3 MG/DL
BUN SERPL-MCNC: 17 MG/DL
CALCIUM SERPL-MCNC: 8.9 MG/DL
CHLORIDE SERPL-SCNC: 112 MMOL/L
CK SERPL-CCNC: 96 U/L
CO2 SERPL-SCNC: 24 MMOL/L
CREAT SERPL-MCNC: 1.3 MG/DL
EST. GFR  (AFRICAN AMERICAN): 57 ML/MIN/1.73 M^2
EST. GFR  (NON AFRICAN AMERICAN): 50 ML/MIN/1.73 M^2
GLUCOSE SERPL-MCNC: 80 MG/DL
POTASSIUM SERPL-SCNC: 4.3 MMOL/L
PROT SERPL-MCNC: 6.8 G/DL
SODIUM SERPL-SCNC: 141 MMOL/L

## 2018-04-05 PROCEDURE — 36415 COLL VENOUS BLD VENIPUNCTURE: CPT | Mod: PN

## 2018-04-05 PROCEDURE — 80053 COMPREHEN METABOLIC PANEL: CPT

## 2018-04-05 PROCEDURE — 82550 ASSAY OF CK (CPK): CPT

## 2018-04-10 ENCOUNTER — TELEPHONE (OUTPATIENT)
Dept: FAMILY MEDICINE | Facility: CLINIC | Age: 48
End: 2018-04-10

## 2018-04-10 NOTE — TELEPHONE ENCOUNTER
----- Message from Carlyle Gordillo MD sent at 4/7/2018  4:43 PM CDT -----  Please let patient know that she needs to increase water intake as she is mildly dehydrated.  Otherwise, labs were all fine    Thanks,  Dr. Gordillo

## 2018-04-11 DIAGNOSIS — G93.2 IIH (IDIOPATHIC INTRACRANIAL HYPERTENSION): ICD-10-CM

## 2018-04-11 DIAGNOSIS — G89.29 CHRONIC NONINTRACTABLE HEADACHE, UNSPECIFIED HEADACHE TYPE: ICD-10-CM

## 2018-04-11 DIAGNOSIS — R51.9 CHRONIC NONINTRACTABLE HEADACHE, UNSPECIFIED HEADACHE TYPE: ICD-10-CM

## 2018-04-11 RX ORDER — TOPIRAMATE 100 MG/1
TABLET, FILM COATED ORAL
Qty: 540 TABLET | Refills: 9 | OUTPATIENT
Start: 2018-04-11

## 2018-05-04 DIAGNOSIS — G89.29 CHRONIC NONINTRACTABLE HEADACHE, UNSPECIFIED HEADACHE TYPE: ICD-10-CM

## 2018-05-04 DIAGNOSIS — G93.2 IIH (IDIOPATHIC INTRACRANIAL HYPERTENSION): ICD-10-CM

## 2018-05-04 DIAGNOSIS — R51.9 CHRONIC NONINTRACTABLE HEADACHE, UNSPECIFIED HEADACHE TYPE: ICD-10-CM

## 2018-05-04 RX ORDER — TOPIRAMATE 100 MG/1
TABLET, FILM COATED ORAL
Qty: 540 TABLET | Refills: 9 | OUTPATIENT
Start: 2018-05-04

## 2018-05-29 ENCOUNTER — OFFICE VISIT (OUTPATIENT)
Dept: FAMILY MEDICINE | Facility: CLINIC | Age: 48
End: 2018-05-29
Payer: MEDICARE

## 2018-05-29 VITALS
DIASTOLIC BLOOD PRESSURE: 76 MMHG | HEART RATE: 66 BPM | SYSTOLIC BLOOD PRESSURE: 110 MMHG | TEMPERATURE: 98 F | OXYGEN SATURATION: 97 % | HEIGHT: 64 IN

## 2018-05-29 DIAGNOSIS — R10.84 GENERALIZED ABDOMINAL PAIN: Primary | ICD-10-CM

## 2018-05-29 PROCEDURE — 3074F SYST BP LT 130 MM HG: CPT | Mod: CPTII,S$GLB,, | Performed by: FAMILY MEDICINE

## 2018-05-29 PROCEDURE — 99999 PR PBB SHADOW E&M-EST. PATIENT-LVL III: CPT | Mod: PBBFAC,,, | Performed by: FAMILY MEDICINE

## 2018-05-29 PROCEDURE — 99213 OFFICE O/P EST LOW 20 MIN: CPT | Mod: S$GLB,,, | Performed by: FAMILY MEDICINE

## 2018-05-29 PROCEDURE — 3078F DIAST BP <80 MM HG: CPT | Mod: CPTII,S$GLB,, | Performed by: FAMILY MEDICINE

## 2018-05-29 RX ORDER — ZONISAMIDE 100 MG/1
CAPSULE ORAL
Refills: 0 | COMMUNITY
Start: 2018-04-17 | End: 2018-08-21

## 2018-05-29 RX ORDER — ZONISAMIDE 25 MG/1
CAPSULE ORAL
Refills: 1 | COMMUNITY
Start: 2018-04-11 | End: 2018-08-21

## 2018-05-29 NOTE — PATIENT INSTRUCTIONS
Viral Gastroenteritis (Adult)    Gastroenteritis is commonly called the stomach flu. It is most often caused by a virus that affects the stomach and intestinal tract and usually lasts from 2 to 7 days. Common viruses causing gastroenteritis include norovirus, rotavirus, and hepatitis A. Non-viral causes of gastroenteritis include bacteria, parasites, and toxins.  The danger from repeated vomiting or diarrhea is dehydration. This is the loss of too much fluid from the body. When this occurs, body fluids must be replaced. Antibiotics do not help with this illness because it is usually viral.Simple home treatment will be helpful.  Symptoms of viral gastroenteritis may include:  · Watery, loose stools  · Stomach pain or abdominal cramps  · Fever and chills  · Nausea and vomiting  · Loss of bowel control  · Headache  Home care  Gastroenteritis is transmitted by contact with the stool or vomit of an infected person. This can occur from person to person or from contact with a contaminated surface.  Follow these guidelines when caring for yourself at home:  · If symptoms are severe, rest at home for the next 24 hours or until you are feeling better.  · Wash your hands with soap and water or use alcohol-based  to prevent the spread of infection. Wash your hands after touching anyone who is sick.  · Wash your hands or use alcohol-based  after using the toilet and before meals. Clean the toilet after each use.  Remember these tips when preparing food:  · People with diarrhea should not prepare or serve food to others. When preparing foods, wash your hands before and after.  · Wash your hands after using cutting boards, countertops, knives, or utensils that have been in contact with raw food.  · Keep uncooked meats away from cooked and ready-to-eat foods.  Medicine  You may use acetaminophen or NSAID medicines like ibuprofen or naproxen to control fever unless another medicine was given. If you have chronic  liver or kidney disease, talk with your healthcare provider before using these medicines. Also talk with your provider if you've had a stomach ulcer or gastrointestinal bleeding. Don't give aspirin to anyone under 18 years of age who is ill with a fever. It may cause severe liver damage. Don't use NSAIDS is you are already taking one for another condition (like arthritis) or are on aspirin (such as for heart disease or after a stroke).  If medicine for vomiting or diarrhea are prescribed, take these only as directed. Do not take over-the-counter medicines for vomiting or diarrhea unless instructed by your healthcare provider.  Diet  Follow these guidelines for food:  · Water and liquids are important so you don't get dehydrated. Drink a small amount at a time or suck on ice chips if you are vomiting.  · If you eat, avoid fatty, greasy, spicy, or fried foods.  · Don't eat dairy if you have diarrhea. This can make diarrhea worse.  · Avoid tobacco, alcohol, and caffeine which may worsen symptoms.  During the first 24 hours (the first full day), follow the diet below:  · Beverages. Sports drinks, soft drinks without caffeine, ginger ale, mineral water (plain or flavored), decaffeinated tea and coffee. If you are very dehydrated, sports drinks aren't a good choice. They have too much sugar and not enough electrolytes. In this case, commercially available products called oral rehydration solutions, are best.  · Soups. Eat clear broth, consommé, and bouillon.  · Desserts. Eat gelatin, popsicles, and fruit juice bars.  During the next 24 hours (the second day), you may add the following to the above:  · Hot cereal, plain toast, bread, rolls, and crackers  · Plain noodles, rice, mashed potatoes, chicken noodle or rice soup  · Unsweetened canned fruit (avoid pineapple), bananas  · Limit fat intake to less than 15 grams per day. Do this by avoiding margarine, butter, oils, mayonnaise, sauces, gravies, fried foods, peanut  butter, meat, poultry, and fish.  · Limit fiber and avoid raw or cooked vegetables, fresh fruits (except bananas), and bran cereals.  · Limit caffeine and chocolate. Don't use spices or seasonings other than salt.  · Limit dairy products.  · Avoid alcohol.  During the next 24 hours:  · Gradually resume a normal diet as you feel better and your symptoms improve.  · If at any time it starts getting worse again, go back to clear liquids until you feel better.  Follow-up care  Follow up with your healthcare provider, or as advised. Call your provider if you don't get better within 24 hours or if diarrhea lasts more than a week. Also follow up if you are unable to keep down liquids and get dehydrated. If a stool (diarrhea) sample was taken, call as directed for the results.  Call 911  Call 911 if any of these occur:  · Trouble breathing  · Chest pain  · Confused  · Severe drowsiness or trouble awakening  · Fainting or loss of consciousness  · Rapid heart rate  · Seizure  · Stiff neck  When to seek medical advice  Call your healthcare provider right away if any of these occur:  · Abdominal pain that gets worse  · Continued vomiting (unable to keep liquids down)  · Frequent diarrhea (more than 5 times a day)  · Blood in vomit or stool (black or red color)  · Dark urine, reduced urine output, or extreme thirst  · Weakness or dizziness  · Drowsiness  · Fever of 100.4°F (38°C) oral or higher that does not get better with fever medicine  · New rash  Date Last Reviewed: 1/3/2016  © 6833-8185 [employer]. [address] [place] [address], [place] [address]. All rights reserved. This information is not intended as a substitute for professional medical care. Always follow your healthcare professional's instructions.

## 2018-05-29 NOTE — PROGRESS NOTES
Chief Complaint   Patient presents with    Abdominal Pain     since last week       HPI    Yanni Kaiser is 47 y.o. female. The encounter diagnosis was Generalized abdominal pain.    47 year old female comes to clinic with compliant of abdominal pain.  Patient reports that she has been having pain top left quadrant for about 1 week.  She reports pain is cramping in nature.  She has been taking Linzess and having regular bowel movements.  She denies blood or mucous with these stools.  She notes decreased appetite, nausea without vomiting.  She reports only been able to keep down liquids and chips.     Patient reports excessive fatigue that has worsened over the last week.  She also reports discolored urine with foul odor.  Patient does admit that her grandchildren did have a diarrheal illness about 1 week ago as well.    Review of Systems   Constitutional: Positive for fatigue. Negative for activity change, chills, diaphoresis and fever.   Respiratory: Negative for shortness of breath.    Cardiovascular: Negative for chest pain.   Gastrointestinal: Positive for abdominal pain and nausea. Negative for blood in stool, constipation, diarrhea and vomiting.   Genitourinary: Positive for dysuria. Negative for decreased urine volume, frequency, urgency and vaginal discharge.   Musculoskeletal: Negative for gait problem.   Psychiatric/Behavioral: Negative for suicidal ideas.           Current Outpatient Prescriptions:     acetaZOLAMIDE (DIAMOX) 500 mg CpSR, Take 1 capsule (500 mg total) by mouth 2 (two) times daily., Disp: 60 capsule, Rfl: 2    albuterol 90 mcg/actuation inhaler, Inhale 2 puffs into the lungs every 6 (six) hours as needed for Wheezing. Rescue, Disp: 1 Inhaler, Rfl: 0    albuterol-ipratropium 2.5mg-0.5mg/3mL (DUO-NEB) 0.5 mg-3 mg(2.5 mg base)/3 mL nebulizer solution, Take 3 mLs by nebulization every 6 (six) hours as needed for Wheezing or Shortness of Breath. Rescue, Disp: 100 mL, Rfl: 0    blood  sugar diagnostic Strp, 1 each by Misc.(Non-Drug; Combo Route) route 4 (four) times daily before meals and nightly. ACCU CHEK ELVIA PLUS METER, Disp: 200 each, Rfl: 3    blood-glucose meter (ACCU-CHEK ELVIA PLUS METER) [facility] [place], TEST FOUR TIMES DAILY BEFORE MEALS AND EVERY NIGHT, Disp: 90 each, Rfl: 1    budesonide-formoterol 160-4.5 mcg (SYMBICORT) 160-4.5 mcg/actuation HFAA, Inhale 2 puffs into the lungs every 12 (twelve) hours. Controller, Disp: 1 Inhaler, Rfl: 5    buPROPion (WELLBUTRIN XL) 150 MG TB24 tablet, Take 1 tablet (150 mg total) by mouth once daily., Disp: 30 tablet, Rfl: 0    capsaicin 0.1 % Crea, Apply 1 application topically 2 (two) times daily., Disp: 56.6 g, Rfl: 1    cetirizine (ZYRTEC) 10 MG tablet, Take 1 tablet (10 mg total) by mouth 2 (two) times daily., Disp: 60 tablet, Rfl: 3    hydroCHLOROthiazide (HYDRODIURIL) 25 MG tablet, Take 1 tablet (25 mg total) by mouth once daily., Disp: 30 tablet, Rfl: 11    hydrOXYzine (ATARAX) 50 MG tablet, Take 1-4 at night for itching., Disp: 120 tablet, Rfl: 3    lancets (ACCU-CHEK SOFTCLIX LANCETS) Misc, 1 Device by Misc.(Non-Drug; Combo Route) route 4 (four) times daily., Disp: 200 each, Rfl: 3    lancets 25 gauge Misc, 1 lancet by Misc.(Non-Drug; Combo Route) route 4 (four) times daily before meals and nightly., Disp: 200 each, Rfl: 11    LINZESS 290 mcg Cap, TK ONE CAPSULE PO D ON AN EMPTY STOMACH, Disp: , Rfl: 5    losartan (COZAAR) 100 MG tablet, Take 1 tablet (100 mg total) by mouth once daily., Disp: 90 tablet, Rfl: 0    magnesium oxide (MAG-OX) 400 mg tablet, Take 1 tablet (400 mg total) by mouth once daily., Disp: 90 tablet, Rfl: 3    meloxicam (MOBIC) 15 MG tablet, TAKE 1 TABLET(15 MG) BY MOUTH DAILY AS NEEDED FOR HEADACHE, Disp: 90 tablet, Rfl: 1    meloxicam (MOBIC) 15 MG tablet, TAKE 1 TABLET(15 MG) BY MOUTH DAILY AS NEEDED FOR HEADACHE, Disp: 90 tablet, Rfl: 1    metformin (GLUCOPHAGE-XR) 500 MG 24 hr tablet, Take 2 tablets (1,000 mg  "total) by mouth 2 (two) times daily with meals., Disp: 360 tablet, Rfl: 0    mometasone (NASONEX) 50 mcg/actuation nasal spray, 2 sprays by Nasal route once daily., Disp: 1 each, Rfl: 11    mometasone 0.1% (ELOCON) 0.1 % cream, , Disp: , Rfl:     NOVOLOG FLEXPEN 100 unit/mL InPn pen, ADM 15 UNITS SC TID B MEALS, Disp: , Rfl: 11    oxyCODONE-acetaminophen (PERCOCET)  mg per tablet, , Disp: , Rfl:     pantoprazole (PROTONIX) 40 MG tablet, Take 40 mg by mouth once daily., Disp: , Rfl:     propranolol (INDERAL) 20 MG tablet, Take 1 tablet (20 mg total) by mouth 2 (two) times daily., Disp: 60 tablet, Rfl: 0    quetiapine (SEROQUEL) 25 MG Tab, Take 1 tablet (25 mg total) by mouth every evening., Disp: 30 tablet, Rfl: 0    TAZTIA  mg CpSR, TK ONE CAPSULE PO D, Disp: , Rfl: 2    tiZANidine (ZANAFLEX) 4 MG tablet, , Disp: , Rfl:     topiramate (TOPAMAX) 100 MG tablet, Take 3 tablets (300 mg total) by mouth 2 (two) times daily. Please start taking 2 tablet (200 mg) twice a day for the next week. Then increase to additional tablet in evenings (3 tablets at night and 2 in morning) for 1 week. Then increase to 3 tablets in morning and 3 tablets in evening., Disp: 90 tablet, Rfl: 11    triamcinolone acetonide 0.1% (KENALOG) 0.1 % ointment, , Disp: , Rfl:     venlafaxine (EFFEXOR-XR) 75 MG 24 hr capsule, TAKE ONE CAPSULE BY MOUTH EVERY EVENING, Disp: 90 capsule, Rfl: 0    zonisamide (ZONEGRAN) 100 MG Cap, TK ONE C PO TID, Disp: , Rfl: 0    zonisamide (ZONEGRAN) 25 MG Cap, , Disp: , Rfl: 1    zonisamide (ZONEGRAN) 50 MG Cap, , Disp: , Rfl:       Blood pressure 110/76, pulse 66, temperature 98 °F (36.7 °C), height 5' 4" (1.626 m), SpO2 97 %.    Physical Exam   Constitutional: Vital signs are normal. She appears well-developed.   Cardiovascular: Normal heart sounds.    No murmur heard.  Pulmonary/Chest: Effort normal and breath sounds normal.   Psychiatric: She has a normal mood and affect. Her behavior is " normal.       Lab Visit on 04/05/2018   Component Date Value Ref Range Status    Sodium 04/05/2018 141  136 - 145 mmol/L Final    Potassium 04/05/2018 4.3  3.5 - 5.1 mmol/L Final    Chloride 04/05/2018 112* 95 - 110 mmol/L Final    CO2 04/05/2018 24  23 - 29 mmol/L Final    Glucose 04/05/2018 80  70 - 110 mg/dL Final    BUN, Bld 04/05/2018 17  6 - 20 mg/dL Final    Creatinine 04/05/2018 1.3  0.5 - 1.4 mg/dL Final    Calcium 04/05/2018 8.9  8.7 - 10.5 mg/dL Final    Total Protein 04/05/2018 6.8  6.0 - 8.4 g/dL Final    Albumin 04/05/2018 3.4* 3.5 - 5.2 g/dL Final    Total Bilirubin 04/05/2018 0.3  0.1 - 1.0 mg/dL Final    Alkaline Phosphatase 04/05/2018 67  55 - 135 U/L Final    AST 04/05/2018 9* 10 - 40 U/L Final    ALT 04/05/2018 10  10 - 44 U/L Final    Anion Gap 04/05/2018 5* 8 - 16 mmol/L Final    eGFR if African American 04/05/2018 57* >60 mL/min/1.73 m^2 Final    eGFR if non African American 04/05/2018 50* >60 mL/min/1.73 m^2 Final    CPK 04/05/2018 96  20 - 180 U/L Final   ]    Assessment:    1. Generalized abdominal pain          Yanni was seen today for abdominal pain.    Diagnoses and all orders for this visit:    Generalized abdominal pain  -     POCT URINE DIPSTICK WITHOUT MICROSCOPE  - New problem.  Obtain urine to rule out UTI.  Likely AGE due to multiple sick contacts with diarrheal illness.  - Instructed patient to remain well hydrated.  Use Imodium if diarrhea develops. [place] diet to reduce nausea.      FOLLOW UP: Follow-up in about 1 week (around 6/5/2018), or if symptoms worsen or fail to improve.

## 2018-05-30 ENCOUNTER — LAB VISIT (OUTPATIENT)
Dept: LAB | Facility: HOSPITAL | Age: 48
End: 2018-05-30
Attending: FAMILY MEDICINE
Payer: MEDICARE

## 2018-05-30 DIAGNOSIS — E11.9 TYPE 2 DIABETES MELLITUS WITHOUT COMPLICATION: ICD-10-CM

## 2018-05-30 PROCEDURE — 82043 UR ALBUMIN QUANTITATIVE: CPT

## 2018-05-31 DIAGNOSIS — G93.2 IIH (IDIOPATHIC INTRACRANIAL HYPERTENSION): ICD-10-CM

## 2018-05-31 DIAGNOSIS — G89.29 CHRONIC NONINTRACTABLE HEADACHE, UNSPECIFIED HEADACHE TYPE: ICD-10-CM

## 2018-05-31 DIAGNOSIS — R51.9 CHRONIC NONINTRACTABLE HEADACHE, UNSPECIFIED HEADACHE TYPE: ICD-10-CM

## 2018-05-31 LAB
CREAT UR-MCNC: 147 MG/DL
MICROALBUMIN UR DL<=1MG/L-MCNC: 5 UG/ML
MICROALBUMIN/CREATININE RATIO: 3.4 UG/MG

## 2018-05-31 RX ORDER — TOPIRAMATE 100 MG/1
TABLET, FILM COATED ORAL
Qty: 90 TABLET | Refills: 0 | OUTPATIENT
Start: 2018-05-31

## 2018-06-07 DIAGNOSIS — G93.2 IIH (IDIOPATHIC INTRACRANIAL HYPERTENSION): ICD-10-CM

## 2018-06-07 DIAGNOSIS — R51.9 CHRONIC NONINTRACTABLE HEADACHE, UNSPECIFIED HEADACHE TYPE: ICD-10-CM

## 2018-06-07 DIAGNOSIS — G89.29 CHRONIC NONINTRACTABLE HEADACHE, UNSPECIFIED HEADACHE TYPE: ICD-10-CM

## 2018-06-12 RX ORDER — TOPIRAMATE 100 MG/1
TABLET, FILM COATED ORAL
Qty: 90 TABLET | Refills: 0 | OUTPATIENT
Start: 2018-06-12

## 2018-06-14 ENCOUNTER — TELEPHONE (OUTPATIENT)
Dept: FAMILY MEDICINE | Facility: CLINIC | Age: 48
End: 2018-06-14

## 2018-06-14 NOTE — TELEPHONE ENCOUNTER
----- Message from Mariely Sabillon sent at 6/14/2018 10:20 AM CDT -----  Contact: Yanni [phone]  Patient is requesting a call back. She would like an appointment today if possible. She is experiencing some body aches and she thinks her asthma is giving her issues. Please call at your earliest convenience.

## 2018-06-18 ENCOUNTER — NURSE TRIAGE (OUTPATIENT)
Dept: ADMINISTRATIVE | Facility: CLINIC | Age: 48
End: 2018-06-18

## 2018-06-18 NOTE — TELEPHONE ENCOUNTER
Reason for Disposition   MILD difficulty breathing (e.g., minimal/no SOB at rest, SOB with walking, pulse < 100) of new onset or worse than normal    Protocols used: ST BREATHING DIFFICULTY-A-OH  pt reports shortness of breath on and off for a week or so. Shortness of breath gets worse with exertion which patient states is new for her. Pt has been using her inhaler often as well. Advised that she needed to be see now. Contact patient to further advise

## 2018-06-19 ENCOUNTER — TELEPHONE (OUTPATIENT)
Dept: FAMILY MEDICINE | Facility: CLINIC | Age: 48
End: 2018-06-19

## 2018-06-19 ENCOUNTER — HOSPITAL ENCOUNTER (EMERGENCY)
Facility: HOSPITAL | Age: 48
Discharge: HOME | End: 2018-06-19
Attending: EMERGENCY MEDICINE
Payer: MEDICARE

## 2018-06-19 VITALS
DIASTOLIC BLOOD PRESSURE: 86 MMHG | BODY MASS INDEX: 50.02 KG/M2 | OXYGEN SATURATION: 97 % | TEMPERATURE: 98 F | WEIGHT: 293 LBS | HEIGHT: 64 IN | SYSTOLIC BLOOD PRESSURE: 166 MMHG | RESPIRATION RATE: 20 BRPM | HEART RATE: 82 BPM

## 2018-06-19 DIAGNOSIS — G93.2 IDIOPATHIC INTRACRANIAL HYPERTENSION: ICD-10-CM

## 2018-06-19 DIAGNOSIS — J45.901 SEVERE ASTHMA WITH ACUTE EXACERBATION, UNSPECIFIED WHETHER PERSISTENT: Primary | ICD-10-CM

## 2018-06-19 DIAGNOSIS — R06.00 DYSPNEA: ICD-10-CM

## 2018-06-19 LAB
ALBUMIN SERPL BCP-MCNC: 3.1 G/DL
ALLENS TEST: ABNORMAL
ALP SERPL-CCNC: 70 U/L
ALT SERPL W/O P-5'-P-CCNC: 11 U/L
ANION GAP SERPL CALC-SCNC: 10 MMOL/L
AST SERPL-CCNC: 11 U/L
B-HCG UR QL: NEGATIVE
BASOPHILS # BLD AUTO: 0.05 K/UL
BASOPHILS NFR BLD: 0.9 %
BILIRUB SERPL-MCNC: 0.4 MG/DL
BUN SERPL-MCNC: 17 MG/DL
CALCIUM SERPL-MCNC: 8.9 MG/DL
CHLORIDE SERPL-SCNC: 113 MMOL/L
CO2 SERPL-SCNC: 19 MMOL/L
CREAT SERPL-MCNC: 1.2 MG/DL
CTP QC/QA: YES
D DIMER PPP IA.FEU-MCNC: 0.62 MG/L FEU
DELSYS: ABNORMAL
DIFFERENTIAL METHOD: ABNORMAL
EOSINOPHIL # BLD AUTO: 0.5 K/UL
EOSINOPHIL NFR BLD: 9.7 %
ERYTHROCYTE [DISTWIDTH] IN BLOOD BY AUTOMATED COUNT: 16.8 %
ERYTHROCYTE [SEDIMENTATION RATE] IN BLOOD BY WESTERGREN METHOD: 20 MM/H
EST. GFR  (AFRICAN AMERICAN): >60 ML/MIN/1.73 M^2
EST. GFR  (NON AFRICAN AMERICAN): 55 ML/MIN/1.73 M^2
FLOW: 8
GLUCOSE CSF-MCNC: 80 MG/DL
GLUCOSE SERPL-MCNC: 106 MG/DL
HCO3 UR-SCNC: 22.6 MMOL/L (ref 24–28)
HCT VFR BLD AUTO: 35.7 %
HGB BLD-MCNC: 11.3 G/DL
LYMPHOCYTES # BLD AUTO: 2.3 K/UL
LYMPHOCYTES NFR BLD: 40.5 %
MCH RBC QN AUTO: 27.2 PG
MCHC RBC AUTO-ENTMCNC: 31.7 G/DL
MCV RBC AUTO: 86 FL
MODE: ABNORMAL
MONOCYTES # BLD AUTO: 0.5 K/UL
MONOCYTES NFR BLD: 9.4 %
NEUTROPHILS # BLD AUTO: 2.2 K/UL
NEUTROPHILS NFR BLD: 39.5 %
PCO2 BLDA: 41.9 MMHG (ref 35–45)
PH SMN: 7.34 [PH] (ref 7.35–7.45)
PLATELET # BLD AUTO: 328 K/UL
PMV BLD AUTO: 9.1 FL
PO2 BLDA: 159 MMHG (ref 80–100)
POC BE: -3 MMOL/L
POC SATURATED O2: 99 % (ref 95–100)
POC TCO2: 24 MMOL/L (ref 23–27)
POCT GLUCOSE: 163 MG/DL (ref 70–110)
POTASSIUM SERPL-SCNC: 3.8 MMOL/L
PROT CSF-MCNC: 54 MG/DL
PROT SERPL-MCNC: 6.8 G/DL
RBC # BLD AUTO: 4.15 M/UL
SAMPLE: ABNORMAL
SITE: ABNORMAL
SODIUM SERPL-SCNC: 142 MMOL/L
SP02: 100
WBC # BLD AUTO: 5.56 K/UL

## 2018-06-19 PROCEDURE — 93005 ELECTROCARDIOGRAM TRACING: CPT

## 2018-06-19 PROCEDURE — 94644 CONT INHLJ TX 1ST HOUR: CPT

## 2018-06-19 PROCEDURE — 99285 EMERGENCY DEPT VISIT HI MDM: CPT | Mod: 25

## 2018-06-19 PROCEDURE — 85379 FIBRIN DEGRADATION QUANT: CPT

## 2018-06-19 PROCEDURE — 63600175 PHARM REV CODE 636 W HCPCS: Performed by: EMERGENCY MEDICINE

## 2018-06-19 PROCEDURE — 25000242 PHARM REV CODE 250 ALT 637 W/ HCPCS: Performed by: EMERGENCY MEDICINE

## 2018-06-19 PROCEDURE — 84157 ASSAY OF PROTEIN OTHER: CPT

## 2018-06-19 PROCEDURE — 89051 BODY FLUID CELL COUNT: CPT

## 2018-06-19 PROCEDURE — 62272 THER SPI PNXR DRG CSF: CPT

## 2018-06-19 PROCEDURE — 99000 SPECIMEN HANDLING OFFICE-LAB: CPT

## 2018-06-19 PROCEDURE — 87070 CULTURE OTHR SPECIMN AEROBIC: CPT | Mod: 59

## 2018-06-19 PROCEDURE — 87205 SMEAR GRAM STAIN: CPT

## 2018-06-19 PROCEDURE — 81025 URINE PREGNANCY TEST: CPT | Performed by: EMERGENCY MEDICINE

## 2018-06-19 PROCEDURE — 25000003 PHARM REV CODE 250: Performed by: EMERGENCY MEDICINE

## 2018-06-19 PROCEDURE — 93010 ELECTROCARDIOGRAM REPORT: CPT | Mod: ,,, | Performed by: INTERNAL MEDICINE

## 2018-06-19 PROCEDURE — 36600 WITHDRAWAL OF ARTERIAL BLOOD: CPT | Mod: 59

## 2018-06-19 PROCEDURE — 94761 N-INVAS EAR/PLS OXIMETRY MLT: CPT

## 2018-06-19 PROCEDURE — 82962 GLUCOSE BLOOD TEST: CPT

## 2018-06-19 PROCEDURE — 87040 BLOOD CULTURE FOR BACTERIA: CPT | Mod: 59

## 2018-06-19 PROCEDURE — 25500020 PHARM REV CODE 255: Performed by: EMERGENCY MEDICINE

## 2018-06-19 PROCEDURE — 85025 COMPLETE CBC W/AUTO DIFF WBC: CPT

## 2018-06-19 PROCEDURE — 96375 TX/PRO/DX INJ NEW DRUG ADDON: CPT | Mod: 59

## 2018-06-19 PROCEDURE — 80053 COMPREHEN METABOLIC PANEL: CPT

## 2018-06-19 PROCEDURE — 82803 BLOOD GASES ANY COMBINATION: CPT

## 2018-06-19 PROCEDURE — 82945 GLUCOSE OTHER FLUID: CPT

## 2018-06-19 PROCEDURE — 27000221 HC OXYGEN, UP TO 24 HOURS

## 2018-06-19 PROCEDURE — 99900035 HC TECH TIME PER 15 MIN (STAT)

## 2018-06-19 PROCEDURE — 96365 THER/PROPH/DIAG IV INF INIT: CPT | Mod: 59

## 2018-06-19 RX ORDER — BUTALBITAL, ACETAMINOPHEN AND CAFFEINE 50; 325; 40 MG/1; MG/1; MG/1
1 TABLET ORAL EVERY 4 HOURS PRN
Qty: 18 TABLET | Refills: 0 | Status: SHIPPED | OUTPATIENT
Start: 2018-06-19 | End: 2018-06-22

## 2018-06-19 RX ORDER — PREDNISONE 20 MG/1
60 TABLET ORAL DAILY
Qty: 6 TABLET | Refills: 0 | Status: SHIPPED | OUTPATIENT
Start: 2018-06-19 | End: 2018-06-21

## 2018-06-19 RX ORDER — ALBUTEROL SULFATE 2.5 MG/.5ML
15 SOLUTION RESPIRATORY (INHALATION)
Status: COMPLETED | OUTPATIENT
Start: 2018-06-19 | End: 2018-06-19

## 2018-06-19 RX ORDER — ACETAMINOPHEN 500 MG
1000 TABLET ORAL
Status: COMPLETED | OUTPATIENT
Start: 2018-06-19 | End: 2018-06-19

## 2018-06-19 RX ORDER — FUROSEMIDE 10 MG/ML
40 INJECTION INTRAMUSCULAR; INTRAVENOUS
Status: DISCONTINUED | OUTPATIENT
Start: 2018-06-19 | End: 2018-06-19

## 2018-06-19 RX ORDER — FUROSEMIDE 10 MG/ML
20 INJECTION INTRAMUSCULAR; INTRAVENOUS
Status: COMPLETED | OUTPATIENT
Start: 2018-06-19 | End: 2018-06-19

## 2018-06-19 RX ORDER — MAGNESIUM SULFATE HEPTAHYDRATE 40 MG/ML
2 INJECTION, SOLUTION INTRAVENOUS ONCE
Status: COMPLETED | OUTPATIENT
Start: 2018-06-19 | End: 2018-06-19

## 2018-06-19 RX ORDER — LIDOCAINE HYDROCHLORIDE 20 MG/ML
5 INJECTION, SOLUTION INFILTRATION; PERINEURAL
Status: COMPLETED | OUTPATIENT
Start: 2018-06-19 | End: 2018-06-19

## 2018-06-19 RX ORDER — METHYLPREDNISOLONE SOD SUCC 125 MG
125 VIAL (EA) INJECTION
Status: COMPLETED | OUTPATIENT
Start: 2018-06-19 | End: 2018-06-19

## 2018-06-19 RX ADMIN — METHYLPREDNISOLONE SODIUM SUCCINATE 125 MG: 125 INJECTION, POWDER, FOR SOLUTION INTRAMUSCULAR; INTRAVENOUS at 06:06

## 2018-06-19 RX ADMIN — ALBUTEROL SULFATE 15 MG: 2.5 SOLUTION RESPIRATORY (INHALATION) at 06:06

## 2018-06-19 RX ADMIN — LIDOCAINE HYDROCHLORIDE 5 ML: 20 INJECTION, SOLUTION INFILTRATION; PERINEURAL at 11:06

## 2018-06-19 RX ADMIN — FUROSEMIDE 20 MG: 10 INJECTION, SOLUTION INTRAMUSCULAR; INTRAVENOUS at 12:06

## 2018-06-19 RX ADMIN — MAGNESIUM SULFATE IN WATER 2 G: 40 INJECTION, SOLUTION INTRAVENOUS at 06:06

## 2018-06-19 RX ADMIN — IOHEXOL 100 ML: 350 INJECTION, SOLUTION INTRAVENOUS at 09:06

## 2018-06-19 RX ADMIN — ACETAMINOPHEN 1000 MG: 500 TABLET, FILM COATED ORAL at 08:06

## 2018-06-19 NOTE — ED NOTES
"Pt. Rating headache 7/10, currently still having some blurred vision but states "not as much as before", Dr. Contreras made aware.   "

## 2018-06-19 NOTE — ED TRIAGE NOTES
Pt presents to er with complaints of sob. States that she had an asthma attack last night and had to use her inhaler last night. States that she has a cough and coughs up clear to thick white mucus intermittently

## 2018-06-19 NOTE — TELEPHONE ENCOUNTER
Patient came to office seeking an appointment. No appointments were available at the time patient requested. Patient office visit scheduled for 6/27. Patient insisted on an earlier appointment. Patient has OV scheduled for 10am on 6/20/18 at [facility] with ARPITA Forde.  No further questions or concerns at this time.

## 2018-06-19 NOTE — ED NOTES
Care handoff received from JOSH Heredia. Pt. Currently undergoing breathing tx, reports headache 10/10 and tightness in chest 7/10, informed pt. Will let MD know pain levels. Pt. Calm and cooperative, family at bedside.

## 2018-06-19 NOTE — ED PROVIDER NOTES
"Encounter Date: 2018    SCRIBE #1 NOTE: I, NoemíHeather Zeferino, am scribing for, and in the presence of,  Will Brown MD. I have scribed the following portions of the note - Other sections scribed: HPI, ROS, PE.       History     Chief Complaint   Patient presents with    Shortness of Breath     pt reports SOB and dry cough ongoing for 1 week; pt has hx of asthma and has been using nebulizer without relief; last treatment was last night; pt has audible wheezing;     Asthma     CC: SOB    46 y/o female with anemia, asthma, DM type II with neurologic complications, GERD, high cholesterol, HTN, Anemia, Pseudotumor cerebri, seizures, , cholecystectomy and sinus surgery presents to the ED for emergent evaluation of SOB, cough, and wheezing. The symptoms are severe (10/10). The patient reports hx of hospital admission due to asthma. Otherwise, the HPI and ROS are limited due to acuity of conditions.    Upon reevaluation, the patient reports x1 wk hx of SOB and productive "cloudy yellow/white" cough. The patient also reports subjective fever, chills, diaphoresis at night, voice hoarseness, bilateral leg pain, and x2 day hx of dizziness with standing. The patient noticed x1 ep of hemoptysis a few days ago that resolved. The patient reports smoking cigarettes. The patient denies drinking EtOH. The patient denies hx of surgeries. No other symptoms reported.      The history is provided by the patient. No  was used.     Review of patient's allergies indicates:  No Known Allergies  Past Medical History:   Diagnosis Date    Allergy     Anemia     Asthma     Depression     Diabetes mellitus, type 2     Diabetes with neurologic complications     GERD (gastroesophageal reflux disease)     High cholesterol     Hypertension     Pseudotumor cerebri     Seizures     Sleep apnea     Type 2 diabetes mellitus      Past Surgical History:   Procedure Laterality Date     SECTION   " "   CHOLECYSTECTOMY      SINUS SURGERY       Family History   Problem Relation Age of Onset    Cancer Mother     Hypertension Mother     Diabetes Mother     Stroke Father     Heart disease Father     COPD Father     Heart attack Father     Cancer Maternal Grandmother      Social History   Substance Use Topics    Smoking status: Current Every Day Smoker     Packs/day: 1.00     Years: 15.00     Types: Cigarettes     Last attempt to quit: 6/10/2015    Smokeless tobacco: Never Used    Alcohol use No     Review of Systems   Constitutional: Positive for chills, diaphoresis (at night) and fever (subjective).   HENT: Positive for voice change (hoarseness). Negative for congestion, ear pain, rhinorrhea and sore throat.    Eyes: Negative for redness.   Respiratory: Positive for cough (productive "cloudy white/yellow"), shortness of breath and wheezing.         (+) x1 ep of hemoptysis   Cardiovascular: Negative for chest pain.   Gastrointestinal: Negative for abdominal pain, diarrhea, nausea and vomiting.   Genitourinary: Negative for decreased urine volume, difficulty urinating, dysuria, frequency, hematuria and urgency.   Musculoskeletal: Negative for back pain and neck pain.        (+) bilateral leg pain   Skin: Negative for rash.   Neurological: Positive for dizziness (with standing). Negative for headaches.       Physical Exam     Initial Vitals [06/19/18 0629]   BP Pulse Resp Temp SpO2   (!) 142/96 75 (!) 24 99.1 °F (37.3 °C) 97 %      MAP       --         Physical Exam    Nursing note and vitals reviewed.  Constitutional: She appears well-developed and well-nourished. She appears distressed.   Patient unable to speak in full sentences.   HENT:   Right Ear: External ear normal.   Left Ear: External ear normal.   Nose: Nose normal.   Mouth/Throat: Oropharynx is clear and moist.   Eyes: Conjunctivae and EOM are normal. Pupils are equal, round, and reactive to light.   Neck: Normal range of motion. Neck supple. "   Cardiovascular: Normal rate, regular rhythm and normal heart sounds.   Pulmonary/Chest: She has wheezes (bilateral expiratory).   Abdominal: Soft. Bowel sounds are normal.   Musculoskeletal: Normal range of motion.   Neurological: She is alert and oriented to person, place, and time. She has normal strength.   Non-focal neurologic exam.   Skin: Skin is warm and dry.   Psychiatric: She has a normal mood and affect. Her behavior is normal. Judgment and thought content normal.         ED Course   Lumbar Puncture  Date/Time: 6/19/2018 12:04 PM  Location procedure was performed: [facility] EMERGENCY DEPARTMENT  Performed by: SELAM TAVAREZ  Authorized by: SELAM TAVAREZ   Pre-operative diagnosis:  IIH  Post-operative diagnosis: IIH  Consent Done: Yes  Indications: treatment of IIH.  Anesthesia: local infiltration    Anesthesia:  Local Anesthetic: lidocaine 2% without epinephrine  Anesthetic total: 2 mL  Patient sedated: no  Lumbar space: L3-L4 interspace  Patient's position: sitting  Needle gauge: 20  Needle type: julianne point  Needle length: 3.5 in  Number of attempts: 2  Opening pressure: 295 cm H2O  Closing pressure: 170 cm H2O  Fluid appearance: clear  Tubes of fluid: 4  Total volume: 7 ml  Post-procedure: adhesive bandage applied  Complications: No  Specimens: No  Implants: No  Patient tolerance: Patient tolerated the procedure well with no immediate complications        Labs Reviewed   CBC W/ AUTO DIFFERENTIAL - Abnormal; Notable for the following:        Result Value    Hemoglobin 11.3 (*)     Hematocrit 35.7 (*)     MCHC 31.7 (*)     RDW 16.8 (*)     MPV 9.1 (*)     Eosinophil% 9.7 (*)     All other components within normal limits   COMPREHENSIVE METABOLIC PANEL - Abnormal; Notable for the following:     Chloride 113 (*)     CO2 19 (*)     Albumin 3.1 (*)     eGFR if non  55 (*)     All other components within normal limits   D DIMER, QUANTITATIVE - Abnormal; Notable for the following:      D-Dimer 0.62 (*)     All other components within normal limits   ISTAT PROCEDURE - Abnormal; Notable for the following:     POC PH 7.340 (*)     POC PO2 159 (*)     POC HCO3 22.6 (*)     All other components within normal limits   POCT GLUCOSE - Abnormal; Notable for the following:     POCT Glucose 163 (*)     All other components within normal limits   CULTURE, BLOOD   CULTURE, BLOOD   CULTURE, CSF  (INCLUDES STAIN)   GLUCOSE, CSF   PROTEIN, CSF   CSF CELL COUNT WITH DIFFERENTIAL   FREEZE AND HOLD -    POCT URINE PREGNANCY          Imaging Results          CTA Chest Non-Coronary (PE Study) (Final result)  Result time 06/19/18 09:57:40    Final result by Armond Julian DO (06/19/18 09:57:40)                 Impression:      No evidence for central pulmonary embolism please note there is severe limitation of the segmental pulmonary arteries specifically in the upper and lower lobes cannot exclude segmental pulmonary emboli.  Clinical correlation and follow-up as warranted.    Heterogeneous mosaic ground-glass attenuation in the lungs which may represent underlying small airway disease.  No focal lung consolidation.  No pleural effusion or pneumothorax.    Scattered prominent axillary lymph nodes more prominent on the right.      Electronically signed by: Armond Julian DO  Date:    06/19/2018  Time:    09:57             Narrative:    EXAMINATION:  CTA CHEST NON CORONARY    CLINICAL HISTORY:  Chest pain, acute, PE suspected, intermed prob, positive D-dimer;    TECHNIQUE:  Low dose axial images, sagittal and coronal reformations were obtained from the thoracic inlet to the lung bases following the IV administration of 100 mL of Omnipaque 350.  Contrast timing was optimized to evaluate the pulmonary arteries.  MIP images were performed.    COMPARISON:  Chest x-ray earlier same day 06/19/2018    FINDINGS:  There is no evidence for central pulmonary embolism.  Please note there is severe motion limitation of the upper  and lower lobe segmental pulmonary arteries cannot exclude segmental pulmonary artery embolism in light of the motion.    There is no focal lung consolidation.  There is patchy ground-glass attenuation in the lungs which may represent small airway disease.  Few linear opacities in lung bases suggesting atelectasis versus scarring.  There is no hilar or mediastinal mass or adenopathy.    Surgical clips in the gallbladder fossa compatible with prior cholecystectomy.  No aneurysmal dilatation of the thoracic aorta.  Few nonspecific axillary lymph nodes bilaterally.  No evidence for acute fracture or subluxation visualized spine                               X-Ray Chest AP Portable (Final result)  Result time 06/19/18 08:22:59    Final result by Guillermo Correa MD (06/19/18 08:22:59)                 Impression:      No acute abnormality.  No significant change.      Electronically signed by: Guillermo Correa MD  Date:    06/19/2018  Time:    08:22             Narrative:    EXAMINATION:  XR CHEST AP PORTABLE    CLINICAL HISTORY:  Asthma;    TECHNIQUE:  Single frontal view of the chest was performed.    COMPARISON:  September 21, 2017    FINDINGS:  Lungs are clear.  No effusion or pneumothorax.  No displaced fracture.                              X-Rays:   Independently Interpreted Readings:   Other Readings:  EXAMINATION:  XR CHEST AP PORTABLE    CLINICAL HISTORY:  Asthma;    TECHNIQUE:  Single frontal view of the chest was performed.    COMPARISON:  September 21, 2017    FINDINGS:  Lungs are clear.  No effusion or pneumothorax.  No displaced fracture.  Impression       No acute abnormality.  No significant change.      Electronically signed by: Guillermo Correa MD  Date: 06/19/2018  Time: 08:22    EXAMINATION:  CTA CHEST NON CORONARY    CLINICAL HISTORY:  Chest pain, acute, PE suspected, intermed prob, positive D-dimer;    TECHNIQUE:  Low dose axial images, sagittal and coronal reformations were obtained from the thoracic  inlet to the lung bases following the IV administration of 100 mL of Omnipaque 350.  Contrast timing was optimized to evaluate the pulmonary arteries.  MIP images were performed.    COMPARISON:  Chest x-ray earlier same day 06/19/2018    FINDINGS:  There is no evidence for central pulmonary embolism.  Please note there is severe motion limitation of the upper and lower lobe segmental pulmonary arteries cannot exclude segmental pulmonary artery embolism in light of the motion.    There is no focal lung consolidation.  There is patchy ground-glass attenuation in the lungs which may represent small airway disease.  Few linear opacities in lung bases suggesting atelectasis versus scarring.  There is no hilar or mediastinal mass or adenopathy.    Surgical clips in the gallbladder fossa compatible with prior cholecystectomy.  No aneurysmal dilatation of the thoracic aorta.  Few nonspecific axillary lymph nodes bilaterally.  No evidence for acute fracture or subluxation visualized spine  Impression       No evidence for central pulmonary embolism please note there is severe limitation of the segmental pulmonary arteries specifically in the upper and lower lobes cannot exclude segmental pulmonary emboli.  Clinical correlation and follow-up as warranted.    Heterogeneous mosaic ground-glass attenuation in the lungs which may represent underlying small airway disease.  No focal lung consolidation.  No pleural effusion or pneumothorax.    Scattered prominent axillary lymph nodes more prominent on the right.      Electronically signed by: Armond Julian DO  Date: 06/19/2018  Time: 09:57          Medical Decision Making:   History:   Old Medical Records: I decided to obtain old medical records.  Clinical Tests:   Lab Tests: Ordered and Reviewed       <> Summary of Lab: Results for VANI CAMPUZANO (MRN [id number]) as of 6/19/2018 10:04    6/19/2018 06:55  WBC: 5.56  RBC: 4.15  Hemoglobin: 11.3 (L)  Hematocrit: 35.7 (L)  MCV:  86  MCH: 27.2  MCHC: 31.7 (L)  RDW: 16.8 (H)  Platelets: 328  MPV: 9.1 (L)  Gran%: 39.5  Gran # (ANC): 2.2  Lymph%: 40.5  Lymph #: 2.3  Mono%: 9.4  Mono #: 0.5  Eosinophil%: 9.7 (H)  Eos #: 0.5  Basophil%: 0.9  Baso #: 0.05  D-Dimer: 0.62 (H)  Sodium: 142  Potassium: 3.8  Chloride: 113 (H)  CO2: 19 (L)  Anion Gap: 10  BUN, Bld: 17  Creatinine: 1.2  eGFR if non : 55 (A)  eGFR if African American: >60  Glucose: 106  Calcium: 8.9  Alkaline Phosphatase: 70  Total Protein: 6.8  Albumin: 3.1 (L)  Total Bilirubin: 0.4  AST: 11  ALT: 11    6/19/2018 07:19  POC PH: 7.340 (L)  POC PCO2: 41.9  POC PO2: 159 (H)  POC BE: -3  POC HCO3: 22.6 (L)  POC SATURATED O2: 99  POC TCO2: 24  Flow: 8  Sample: ARTERIAL  DelSys: Aero Mask  Allens Test: Pass  Site: RR  Mode: SPONT  Rate: 20    6/19/2018 07:25  CULTURE, BLOOD: Rpt    6/19/2018 07:30  CULTURE, BLOOD: Rpt    6/19/2018 09:12  Preg Test, Ur: Negative  Results for VANI CAMPUZANO (MRN [id number]) as of 6/19/2018 13:29    6/19/2018 12:04  Color, CSF: Colorless  Heme [place]: 4.0  Appearance, CSF: Clear  WBC, CSF: 4  Glucose, CSF: 80 (H)  Protein, CSF: 54 (H)    Radiological Study: Ordered  Medical Tests: Ordered  ED Management:  0853h feeling better chest clear   1100h patient had a severe headache 9/10 especially moving her eyes. She has a H/O IIH and has been tapped for this before.  1203h Patient had an LP and had an OP of 295.   Patient has an adequate amount of symbicort.  1236h H/A down to a 7 and blurred vision improving.  1329h CSF cell count 4 WBC glucose and protein high not consistent with meningitis (even though I did the tap to take of CSF)            Scribe Attestation:   Scribe #1: I performed the above scribed service and the documentation accurately describes the services I performed. I attest to the accuracy of the note.    Attending Attestation:           Physician Attestation for Scribe:  Physician Attestation Statement for Scribe #1: Will MARTINEZ  SANDRA Brown MD, reviewed documentation, as scribed by Denise Mcgarry in my presence, and it is both accurate and complete.                    Clinical Impression:   The primary encounter diagnosis was Severe asthma with acute exacerbation, unspecified whether persistent. Diagnoses of Dyspnea and Idiopathic intracranial hypertension were also pertinent to this visit.      Disposition:   Disposition: Discharged  Condition: Stable                        Will Contreras MD  06/19/18 7935

## 2018-06-20 ENCOUNTER — OFFICE VISIT (OUTPATIENT)
Dept: FAMILY MEDICINE | Facility: CLINIC | Age: 48
End: 2018-06-20
Payer: MEDICARE

## 2018-06-20 ENCOUNTER — TELEPHONE (OUTPATIENT)
Dept: FAMILY MEDICINE | Facility: CLINIC | Age: 48
End: 2018-06-20

## 2018-06-20 ENCOUNTER — HOSPITAL ENCOUNTER (OUTPATIENT)
Dept: RADIOLOGY | Facility: HOSPITAL | Age: 48
Discharge: HOME | End: 2018-06-20
Attending: NURSE PRACTITIONER
Payer: MEDICARE

## 2018-06-20 VITALS
TEMPERATURE: 98 F | DIASTOLIC BLOOD PRESSURE: 83 MMHG | HEIGHT: 64 IN | BODY MASS INDEX: 50.02 KG/M2 | WEIGHT: 293 LBS | SYSTOLIC BLOOD PRESSURE: 128 MMHG | OXYGEN SATURATION: 97 % | HEART RATE: 81 BPM

## 2018-06-20 DIAGNOSIS — E78.5 HYPERLIPIDEMIA, UNSPECIFIED HYPERLIPIDEMIA TYPE: Chronic | ICD-10-CM

## 2018-06-20 DIAGNOSIS — G44.201 ACUTE INTRACTABLE TENSION-TYPE HEADACHE: ICD-10-CM

## 2018-06-20 DIAGNOSIS — G97.1 HEADACHE AFTER SPINAL PUNCTURE: Primary | ICD-10-CM

## 2018-06-20 DIAGNOSIS — I10 ESSENTIAL HYPERTENSION: Chronic | ICD-10-CM

## 2018-06-20 DIAGNOSIS — Z12.39 SCREENING FOR MALIGNANT NEOPLASM OF BREAST: ICD-10-CM

## 2018-06-20 DIAGNOSIS — J45.21 MILD INTERMITTENT ASTHMA WITH ACUTE EXACERBATION: ICD-10-CM

## 2018-06-20 DIAGNOSIS — Z79.4 TYPE 2 DIABETES MELLITUS WITH STAGE 3 CHRONIC KIDNEY DISEASE, WITH LONG-TERM CURRENT USE OF INSULIN: ICD-10-CM

## 2018-06-20 DIAGNOSIS — E11.22 TYPE 2 DIABETES MELLITUS WITH STAGE 3 CHRONIC KIDNEY DISEASE, WITH LONG-TERM CURRENT USE OF INSULIN: ICD-10-CM

## 2018-06-20 DIAGNOSIS — E11.8 CONTROLLED TYPE 2 DIABETES MELLITUS WITH COMPLICATION, WITHOUT LONG-TERM CURRENT USE OF INSULIN: Chronic | ICD-10-CM

## 2018-06-20 DIAGNOSIS — N18.30 TYPE 2 DIABETES MELLITUS WITH STAGE 3 CHRONIC KIDNEY DISEASE, WITH LONG-TERM CURRENT USE OF INSULIN: ICD-10-CM

## 2018-06-20 DIAGNOSIS — G43.009 MIGRAINE WITHOUT AURA AND WITHOUT STATUS MIGRAINOSUS, NOT INTRACTABLE: Chronic | ICD-10-CM

## 2018-06-20 LAB
CLARITY CSF: CLEAR
COLOR CSF: COLORLESS
POCT GLUCOSE: 172 MG/DL (ref 70–110)
RBC # CSF: 106 /CU MM
SPECIMEN VOL CSF: 4 ML
WBC # CSF: 4 /CU MM

## 2018-06-20 PROCEDURE — 77067 SCR MAMMO BI INCL CAD: CPT | Mod: TC,PO

## 2018-06-20 PROCEDURE — 77063 BREAST TOMOSYNTHESIS BI: CPT | Mod: 26,,, | Performed by: RADIOLOGY

## 2018-06-20 PROCEDURE — 3074F SYST BP LT 130 MM HG: CPT | Mod: CPTII,S$GLB,, | Performed by: NURSE PRACTITIONER

## 2018-06-20 PROCEDURE — 99499 UNLISTED E&M SERVICE: CPT | Mod: S$GLB,,, | Performed by: NURSE PRACTITIONER

## 2018-06-20 PROCEDURE — 99214 OFFICE O/P EST MOD 30 MIN: CPT | Mod: S$GLB,,, | Performed by: NURSE PRACTITIONER

## 2018-06-20 PROCEDURE — 99999 PR PBB SHADOW E&M-EST. PATIENT-LVL V: CPT | Mod: PBBFAC,,, | Performed by: NURSE PRACTITIONER

## 2018-06-20 PROCEDURE — 3079F DIAST BP 80-89 MM HG: CPT | Mod: CPTII,S$GLB,, | Performed by: NURSE PRACTITIONER

## 2018-06-20 PROCEDURE — 3008F BODY MASS INDEX DOCD: CPT | Mod: CPTII,S$GLB,, | Performed by: NURSE PRACTITIONER

## 2018-06-20 PROCEDURE — 3044F HG A1C LEVEL LT 7.0%: CPT | Mod: CPTII,S$GLB,, | Performed by: NURSE PRACTITIONER

## 2018-06-20 PROCEDURE — 77067 SCR MAMMO BI INCL CAD: CPT | Mod: 26,,, | Performed by: RADIOLOGY

## 2018-06-20 NOTE — PROGRESS NOTES
History of Present Illness   Yanni Kaiser is a 47 y.o. woman with medical history as listed below who presents today for ER follow-up, asthma exacerbation. She presented with a one week history of asthma flare with wheezing, SOB, and productive cough despite using her home rescue inhalers. I have reviewed all labs and procedures from the ER visit. She had CBC, CMP, D dimer, and blood cultures. Her D dimer was elevated prompting a CT of the chest and chest x-ray which were negative for PE or other cardiopulmonary processes. She also had a lumbar puncture performed given her headaches which revealed clear CSF with elevated glucose similar to prior. Her EKG was unchanged from prior. She received IV magnesium, solumedrol, and nebulizer treatment. She was discharged home with oral prednisone and Fioricet for headaches and instructed to follow-up with her PCP office.    She states since discharge, her asthma flare has improved. She reports continued headaches consistent with tension type headaches. These headaches are unchanged from prior. She reports some dizziness and sensitivity to light with headaches. She has no nausea or vomiting. She also states her blood sugars have been elevated with the steroids. She has a follow-up with her neurologist scheduled for tomorrow morning. She has no additional complaints and is otherwise healthy on today's visit.      Past Medical History:   Diagnosis Date    Allergy     Anemia     Asthma     Depression     Diabetes mellitus, type 2     Diabetes with neurologic complications     GERD (gastroesophageal reflux disease)     High cholesterol     Hypertension     Pseudotumor cerebri     Seizures     Sleep apnea     Type 2 diabetes mellitus        Past Surgical History:   Procedure Laterality Date    BREAST CYST ASPIRATION       SECTION      CHOLECYSTECTOMY      SINUS SURGERY         Social History     Social History    Marital status:      Spouse  "name: N/A    Number of children: N/A    Years of education: N/A     Social History Main Topics    Smoking status: Current Every Day Smoker     Packs/day: 1.00     Years: 15.00     Types: Cigarettes     Last attempt to quit: 6/10/2015    Smokeless tobacco: Never Used    Alcohol use No    Drug use: No    Sexual activity: Yes     Partners: Male     Birth control/ protection: None     Other Topics Concern    None     Social History Narrative    None       Family History   Problem Relation Age of Onset    Cancer Mother     Hypertension Mother     Diabetes Mother     Stroke Father     Heart disease Father     COPD Father     Heart attack Father     Cancer Maternal Grandmother     Breast cancer Maternal Grandmother        Review of Systems  Review of Systems   Constitutional: Negative for chills, fever and malaise/fatigue.   HENT: Positive for congestion.    Eyes: Negative for blurred vision and double vision.   Respiratory: Positive for cough. Negative for shortness of breath and wheezing.    Cardiovascular: Negative for chest pain, palpitations and leg swelling.   Gastrointestinal: Negative for nausea and vomiting.   Neurological: Positive for dizziness and headaches. Negative for tingling, sensory change, focal weakness and loss of consciousness.     A complete review of systems was otherwise negative.    Physical Exam  /83 (BP Location: Right arm, Patient Position: Sitting, BP Method: X-Large (Manual))   Pulse 81   Temp 98.4 °F (36.9 °C) (Oral)   Ht 5' 4" (1.626 m)   Wt (!) 161.5 kg (356 lb)   SpO2 97%   BMI 61.11 kg/m²   General appearance: alert, appears stated age, cooperative and no distress  Eyes: negative findings: lids and lashes normal, conjunctivae and sclerae normal, pupils equal, round, reactive to light and accomodation and negative for nystagmus  Lungs: clear to auscultation bilaterally and no wheezes, rales, or rhonchi  Heart: regular rate and rhythm, S1, S2 normal, no " murmur, click, rub or gallop  Extremities: extremities normal, atraumatic, no cyanosis or edema  Pulses: 2+ and symmetric  Skin: Skin color, texture, turgor normal. No rashes or lesions  Lymph nodes: Cervical, supraclavicular, and axillary nodes normal.  Neurologic: Mental status: Alert, oriented, thought content appropriate  Cranial nerves: normal  Sensory: normal  Motor:grossly normal  Reflexes: 2+ and symmetric  Coordination: normal  Gait: Normal    Assessment/Plan  Headache after spinal puncture  We discussed that her symptoms are actually more consistent with tension type headache, not complication with her lumbar puncture.  Take your headache medication and rest in a dark, quiet room.  Stay well hydrated.  Follow-up with your neurologist as scheduled tomorrow.  ER precautions discussed with patient, no red flags on exam today.  Unremarkable neuro exam today, with appropriate mentation.    Acute intractable tension-type headache  As above.    Migraine without aura and without status migrainosus, not intractable  As above.    Mild intermittent asthma with acute exacerbation  Doing with since discharge.  Continue the prednisone, monitor CBG and contact me if >250 while on steroids.    Essential hypertension  The current medical regimen is effective;  continue present plan and medications.    Hyperlipidemia, unspecified hyperlipidemia type  The current medical regimen is effective;  continue present plan and medications.    Controlled type 2 diabetes mellitus with complication, without long-term current use of insulin  The current medical regimen is effective;  continue present plan and medications.  Monitor CBG while on steroids.    Type 2 diabetes mellitus with stage 3 chronic kidney disease, with long-term current use of insulin  As above.    Screening for malignant neoplasm of breast  Due for screening, ordered today.  -     Cancel: Mammo Digital Screening Bilateral With CAD; Future; Expected date:  06/20/2018    She has verbalized understanding and is in agreement with plan of care.    Follow-up in about 1 day (around 6/21/2018) for follow-up with neurology.

## 2018-06-20 NOTE — PATIENT INSTRUCTIONS
Tension Headache    A muscle tension headache is a very common cause of head pain. Its also called a stress headache. When some people are under stress, they tense the muscles of their shoulder, neck, and scalp without knowing it. If this tension lasts long enough, a headache can occur. A tension headache can be quite painful. It can last for hours or even days.  Home care  Follow these tips when caring for yourself at home:  · Dont drive yourself home if you were given pain medicine for your headache. Instead, have someone else drive you home. Try to sleep when you get home. You should feel much better when you wake up.  · Put heat on the back of your neck to help ease neck spasm.  · Drink only clear liquids or eat a light diet until your symptoms get better. This will help you avoid nausea or vomiting.  How to prevent headaches  · Figure out what is causing stress in your life. Learn new ways to handle your stress. Ideas include regular exercise, biofeedback, self-hypnosis, yoga, and meditation. Talk with your healthcare provider to find out more information about managing stress. Many books and digital media are also available on this subject.  · Take time out at the first sign of a tension headache, if possible. Take yourself out of the stressful situation. Find a quiet, comfortable place to sit or lie down and let yourself relax. Heat and deep massage of the tight areas in the neck and shoulders may help ease muscle spasm. You may also get relief from a medicine like ibuprofen or a prescribed muscle relaxant.  Follow-up care  Follow up with your healthcare provider, or as advised. Talk with your provider if you have frequent headaches. He or she can figure out a treatment plan. Ask if you can have medicine to take at home the next time you get a bad headache. This may keep you from having to visit the emergency department in the future. You may need to see a headache specialist (neurologist) if you continue  to have headaches.  When to seek medical advice  Call your healthcare provider right away if any of these occur:  · Your head pain gets worse during sexual intercourse or strenuous activity  · Your head pain doesnt get better within 24 hours  · You arent able to keep liquids down (repeated vomiting)  · Fever of 100.4ºF (38ºC) or higher, or as directed by your healthcare provider  · Stiff neck  · Extreme drowsiness, confusion, or fainting  · Dizziness or dizziness with spinning sensation (vertigo)  · Weakness in an arm or leg or one side of your face  · You have difficulty speaking  · Your vision changes  Date Last Reviewed: 8/1/2016  © 4315-9103 [employer]. [address] [place] [address], [place] [address]. All rights reserved. This information is not intended as a substitute for professional medical care. Always follow your healthcare professional's instructions.

## 2018-06-21 ENCOUNTER — OFFICE VISIT (OUTPATIENT)
Dept: NEUROLOGY | Facility: CLINIC | Age: 48
End: 2018-06-21
Payer: MEDICARE

## 2018-06-21 VITALS — HEIGHT: 64 IN | BODY MASS INDEX: 50.02 KG/M2 | WEIGHT: 293 LBS

## 2018-06-21 DIAGNOSIS — G89.29 CHRONIC NONINTRACTABLE HEADACHE, UNSPECIFIED HEADACHE TYPE: ICD-10-CM

## 2018-06-21 DIAGNOSIS — G93.2 IIH (IDIOPATHIC INTRACRANIAL HYPERTENSION): ICD-10-CM

## 2018-06-21 DIAGNOSIS — H47.11 PAPILLEDEMA ASSOCIATED WITH INCREASED INTRACRANIAL PRESSURE: ICD-10-CM

## 2018-06-21 DIAGNOSIS — M54.50 CHRONIC BILATERAL LOW BACK PAIN WITHOUT SCIATICA: ICD-10-CM

## 2018-06-21 DIAGNOSIS — G93.2 IDIOPATHIC INTRACRANIAL HYPERTENSION: Primary | Chronic | ICD-10-CM

## 2018-06-21 DIAGNOSIS — I82.90 THROMBUS: ICD-10-CM

## 2018-06-21 DIAGNOSIS — R51.9 CHRONIC NONINTRACTABLE HEADACHE, UNSPECIFIED HEADACHE TYPE: ICD-10-CM

## 2018-06-21 DIAGNOSIS — G89.29 CHRONIC BILATERAL LOW BACK PAIN WITHOUT SCIATICA: ICD-10-CM

## 2018-06-21 DIAGNOSIS — G08: ICD-10-CM

## 2018-06-21 PROCEDURE — 99999 PR PBB SHADOW E&M-EST. PATIENT-LVL III: CPT | Mod: PBBFAC,,, | Performed by: PSYCHIATRY & NEUROLOGY

## 2018-06-21 PROCEDURE — 99499 UNLISTED E&M SERVICE: CPT | Mod: S$GLB,,, | Performed by: PSYCHIATRY & NEUROLOGY

## 2018-06-21 PROCEDURE — 3008F BODY MASS INDEX DOCD: CPT | Mod: CPTII,S$GLB,, | Performed by: PSYCHIATRY & NEUROLOGY

## 2018-06-21 PROCEDURE — 99213 OFFICE O/P EST LOW 20 MIN: CPT | Mod: S$GLB,,, | Performed by: PSYCHIATRY & NEUROLOGY

## 2018-06-21 RX ORDER — ACETAZOLAMIDE 500 MG/1
500 CAPSULE, EXTENDED RELEASE ORAL 2 TIMES DAILY
Qty: 360 CAPSULE | Refills: 1 | Status: SHIPPED | OUTPATIENT
Start: 2018-06-21 | End: 2020-08-19

## 2018-06-21 RX ORDER — VENLAFAXINE HYDROCHLORIDE 75 MG/1
75 CAPSULE, EXTENDED RELEASE ORAL NIGHTLY
Qty: 90 CAPSULE | Refills: 1 | Status: SHIPPED | OUTPATIENT
Start: 2018-06-21 | End: 2018-12-27 | Stop reason: SDUPTHER

## 2018-06-21 NOTE — LETTER
June 21, 2018      Carlyle Gordillo MD  [address] [place] [address]           [facility]  [address]  [place] [address]  Phone: [phone]  Fax: [phone]          Patient: Yanni Kaiser   MR Number: [id number]   YOB: 1970   Date of Visit: 6/21/2018       Dear Dr. Carlyle KELLY Page:    Thank you for referring Yanni Kaiser to me for evaluation. Attached you will find relevant portions of my assessment and plan of care.    If you have questions, please do not hesitate to call me. I look forward to following Yanni Kaiser along with you.    Sincerely,    Riccardo Moore MD    Enclosure  CC:  No Recipients    If you would like to receive this communication electronically, please contact [email or web address] or [phone] to request more information on [employer] Link access.    For providers and/or their staff who would like to refer a patient to [facility], please contact us through our one-stop-shop provider referral line, [facility], at [phone].    If you feel you have received this communication in error or would no longer like to receive these types of communications, please e-mail [email or web address]

## 2018-06-21 NOTE — PROGRESS NOTES
"[facility] - NEUROLOGY  [facility] [place]    Date: June 21, 2018   Patient Name: Yanni Kaiser   MRN: [id number]   PCP: Carlyle Gordillo  Referring Provider: Carlyle Gordillo MD    Assessment:      This is Yanni Kaiser, 47 y.o. female with morbid obesity, history of IIH, and seizures.  LP  3/2016 with OP 24, 1/2017 with OP 14, 6/2018 with OP 29.5 although it should be noted that the patient was not positioned appropriately for accurate measurement of ICP.  Optho exam 1/2017 without evidence of ongoing increased ICP.  I would recommend against repeating LP in the future unless there is some indication to suspect change in ICP, repeated LP will put her at risk for low pressure headache and further back pain.  Ongoing headache more likely related to migraines which has a medication over use component.      Her excessive sedation is likely due in part to SHARATH which is untreated, currently following with sleep, but her medication regiment is unnecessarily extensive.  She had a non-diagnostic EMU admit 7/2017.    Plan:      MRV brain to assess for stenosis  MRI lumbar  Increase diamox to 1000mg bid  Continue TPM 300mg bid  Will need repeat visual fields and NFL testing  Mobic and zanaflex prn    Patient was sent to the ED for further assessment and management of asthma    Follow up 6 months       I discussed side effects of the medications. I asked the patient to stop the medication if she notices serious adverse effects as we discussed and to seek immediate medical attention at an ER.     Riccardo Moore MD  [facility]   Department of Neurology    Subjective:   Patient has had severe worsening of asthma for the past two weeks and underwent LP when she presented to the ED for shortness of breath two days  Reports headache with pain on eye movement  Unable to speak in full sentences  Reports she has issues with "too much fluid" causing back pain among other issues " "and states this is responsible for her weight    2018  Presents today due to left periorbital headache with +photo/phonophobia and nausea beginning two days ago, has taken tylenol without relief.  Reports this will happen about once a month.    2017  ED presentation for syncopal episode 2017, admit for DKA 2017 which she attributes to steroids for asthma exacerbation  Reports staring spell about every two weeks, unsure when last convulsion was  Continues with headaches "every other day", takes daily tylenol and excedrine migraine about twice a week  Unsure what medication has been most helpful for headache    2017  Baseline 5/10 headache with L>R shooting pains and photophobia.  Currently taking diamox, TPM, GBP, LTG although not sure of doses.  Endorses poor sleep due to frequent awakenings to urinate  Reports initial seizure was GTC on Julian Gras day 2002.  Reports GTC typically occur from sleep but none in over a year.  Reports staring spells but does not know how frequent they are.  Previously on keppra.    PAST MEDICAL HISTORY:  Past Medical History:   Diagnosis Date    Allergy     Anemia     Asthma     Depression     Diabetes mellitus, type 2     Diabetes with neurologic complications     GERD (gastroesophageal reflux disease)     High cholesterol     Hypertension     Pseudotumor cerebri     Seizures     Sleep apnea     Type 2 diabetes mellitus        PAST SURGICAL HISTORY:  Past Surgical History:   Procedure Laterality Date    BREAST CYST ASPIRATION       SECTION      CHOLECYSTECTOMY      SINUS SURGERY         CURRENT MEDS:  Current Outpatient Prescriptions   Medication Sig Dispense Refill    acetaZOLAMIDE (DIAMOX) 500 mg CpSR Take 1 capsule (500 mg total) by mouth 2 (two) times daily. 360 capsule 1    albuterol 90 mcg/actuation inhaler Inhale 2 puffs into the lungs every 6 (six) hours as needed for Wheezing. Rescue 1 Inhaler 0    albuterol-ipratropium 2.5mg-0.5mg/3mL " (DUO-NEB) 0.5 mg-3 mg(2.5 mg base)/3 mL nebulizer solution Take 3 mLs by nebulization every 6 (six) hours as needed for Wheezing or Shortness of Breath. Rescue 100 mL 0    blood sugar diagnostic Strp 1 each by Misc.(Non-Drug; Combo Route) route 4 (four) times daily before meals and nightly. ACCU CHEK ELVIA PLUS METER 200 each 3    blood-glucose meter (ACCU-CHEK ELVIA PLUS METER) [facility] TEST FOUR TIMES DAILY BEFORE MEALS AND EVERY NIGHT 90 each 1    budesonide-formoterol 160-4.5 mcg (SYMBICORT) 160-4.5 mcg/actuation HFAA Inhale 2 puffs into the lungs every 12 (twelve) hours. Controller 1 Inhaler 5    buPROPion (WELLBUTRIN XL) 150 MG TB24 tablet Take 1 tablet (150 mg total) by mouth once daily. 30 tablet 0    butalbital-acetaminophen-caffeine -40 mg (FIORICET, ESGIC) -40 mg per tablet Take 1 tablet by mouth every 4 (four) hours as needed for Pain. 18 tablet 0    capsaicin 0.1 % Crea Apply 1 application topically 2 (two) times daily. 56.6 g 1    cetirizine (ZYRTEC) 10 MG tablet Take 1 tablet (10 mg total) by mouth 2 (two) times daily. 60 tablet 3    hydroCHLOROthiazide (HYDRODIURIL) 25 MG tablet Take 1 tablet (25 mg total) by mouth once daily. 30 tablet 11    hydrOXYzine (ATARAX) 50 MG tablet Take 1-4 at night for itching. 120 tablet 3    lancets (ACCU-CHEK SOFTCLIX LANCETS) Misc 1 Device by Misc.(Non-Drug; Combo Route) route 4 (four) times daily. 200 each 3    lancets 25 gauge Misc 1 lancet by Misc.(Non-Drug; Combo Route) route 4 (four) times daily before meals and nightly. 200 each 11    LINZESS 290 mcg Cap TK ONE CAPSULE PO D ON AN EMPTY STOMACH  5    losartan (COZAAR) 100 MG tablet Take 1 tablet (100 mg total) by mouth once daily. 90 tablet 0    magnesium oxide (MAG-OX) 400 mg tablet Take 1 tablet (400 mg total) by mouth once daily. 90 tablet 3    meloxicam (MOBIC) 15 MG tablet TAKE 1 TABLET(15 MG) BY MOUTH DAILY AS NEEDED FOR HEADACHE 90 tablet 1    meloxicam (MOBIC) 15 MG tablet TAKE 1  TABLET(15 MG) BY MOUTH DAILY AS NEEDED FOR HEADACHE 90 tablet 1    metformin (GLUCOPHAGE-XR) 500 MG 24 hr tablet Take 2 tablets (1,000 mg total) by mouth 2 (two) times daily with meals. 360 tablet 0    mometasone (NASONEX) 50 mcg/actuation nasal spray 2 sprays by Nasal route once daily. 1 each 11    mometasone 0.1% (ELOCON) 0.1 % cream       NOVOLOG FLEXPEN 100 unit/mL InPn pen ADM 15 UNITS SC TID B MEALS  11    oxyCODONE-acetaminophen (PERCOCET)  mg per tablet       pantoprazole (PROTONIX) 40 MG tablet Take 40 mg by mouth once daily.      predniSONE (DELTASONE) 20 MG tablet Take 3 tablets (60 mg total) by mouth once daily. With food and start this prescription on 6/20/2018 6 tablet 0    propranolol (INDERAL) 20 MG tablet Take 1 tablet (20 mg total) by mouth 2 (two) times daily. 60 tablet 0    quetiapine (SEROQUEL) 25 MG Tab Take 1 tablet (25 mg total) by mouth every evening. 30 tablet 0    TAZTIA  mg CpSR TK ONE CAPSULE PO D  2    tiZANidine (ZANAFLEX) 4 MG tablet       topiramate (TOPAMAX) 100 MG tablet Take 3 tablets (300 mg total) by mouth 2 (two) times daily. Please start taking 2 tablet (200 mg) twice a day for the next week. Then increase to additional tablet in evenings (3 tablets at night and 2 in morning) for 1 week. Then increase to 3 tablets in morning and 3 tablets in evening. 90 tablet 11    triamcinolone acetonide 0.1% (KENALOG) 0.1 % ointment       venlafaxine (EFFEXOR-XR) 75 MG 24 hr capsule Take 1 capsule (75 mg total) by mouth every evening. 90 capsule 1    zonisamide (ZONEGRAN) 100 MG Cap TK ONE C PO TID  0    zonisamide (ZONEGRAN) 25 MG Cap   1    zonisamide (ZONEGRAN) 50 MG Cap        No current facility-administered medications for this visit.        ALLERGIES:  Review of patient's allergies indicates:  No Known Allergies    FAMILY HISTORY:  Family History   Problem Relation Age of Onset    Cancer Mother     Hypertension Mother     Diabetes Mother     Stroke  "Father     Heart disease Father     COPD Father     Heart attack Father     Cancer Maternal Grandmother     Breast cancer Maternal Grandmother        SOCIAL HISTORY:  Social History   Substance Use Topics    Smoking status: Current Every Day Smoker     Packs/day: 1.00     Years: 15.00     Types: Cigarettes     Last attempt to quit: 6/10/2015    Smokeless tobacco: Never Used    Alcohol use No       Review of Systems:  12 review of systems is negative except for the symptoms mentioned in HPI.        Objective:     Vitals:    06/21/18 1348   Weight: (!) 162.6 kg (358 lb 7.5 oz)   Height: 5' 4" (1.626 m)       General: + distraught  Eyes: no tearing, discharge, no erythema   ENT: moist mucous membranes of the oral cavity, nares patent    Neck: Supple, full range of motion  Cardiovascular: Warm and well perfused, pulses equal and symmetrical  Lungs: decreased air movement in left lung  Skin: No rash, lesions, or breakdown on exposed skin  Psychiatry: frequently closes eyes, appears very apathetic   Abdomen: soft, non tender, non distended  Extremeties: No cyanosis, clubbing or edema.    Neurological   MENTAL STATUS: Alert and oriented to person, place, and time. Attention and concentration within normal limits. Speech without dysarthria, able to name and repeat without difficulty. Recent and remote memory within normal limits   CRANIAL NERVES: Visual fields intact. PERRL, unable to visualize fundus. EOMI. Facial sensation intact. Face symmetrical. Hearing grossly intact. Full shoulder shrug bilaterally. Tongue protrudes midline.    SENSORY: Sensation is intact to light touch throughout.  MOTOR: Normal bulk and tone. No pronator drift.  Give-away weakness throughout but 5/5 deltoid, biceps, triceps, interosseous, hand  bilaterally. 5/5 iliopsoas, knee extension/flexion, foot dorsi/plantarflexion bilaterally.  Variable postural tremor of bilateral upper extremities.  CEREBELLAR/COORDINATION/GAIT: Gait steady " with normal arm swing and stride length.

## 2018-06-22 LAB
BACTERIA CSF CULT: NO GROWTH
GRAM STN SPEC: NORMAL

## 2018-06-24 LAB
BACTERIA BLD CULT: NORMAL
BACTERIA BLD CULT: NORMAL

## 2018-06-27 ENCOUNTER — TELEPHONE (OUTPATIENT)
Dept: FAMILY MEDICINE | Facility: CLINIC | Age: 48
End: 2018-06-27

## 2018-06-27 ENCOUNTER — LAB VISIT (OUTPATIENT)
Dept: LAB | Facility: HOSPITAL | Age: 48
End: 2018-06-27
Attending: FAMILY MEDICINE
Payer: MEDICARE

## 2018-06-27 ENCOUNTER — OFFICE VISIT (OUTPATIENT)
Dept: FAMILY MEDICINE | Facility: CLINIC | Age: 48
End: 2018-06-27
Payer: MEDICARE

## 2018-06-27 VITALS
RESPIRATION RATE: 28 BRPM | DIASTOLIC BLOOD PRESSURE: 73 MMHG | WEIGHT: 293 LBS | TEMPERATURE: 98 F | SYSTOLIC BLOOD PRESSURE: 119 MMHG | HEART RATE: 69 BPM | BODY MASS INDEX: 50.02 KG/M2 | HEIGHT: 64 IN | OXYGEN SATURATION: 98 %

## 2018-06-27 DIAGNOSIS — Z79.891 CHRONIC PRESCRIPTION OPIATE USE: ICD-10-CM

## 2018-06-27 DIAGNOSIS — J45.21 MILD INTERMITTENT ASTHMA WITH ACUTE EXACERBATION: ICD-10-CM

## 2018-06-27 DIAGNOSIS — G93.2 IDIOPATHIC INTRACRANIAL HYPERTENSION: Chronic | ICD-10-CM

## 2018-06-27 DIAGNOSIS — Z72.0 TOBACCO ABUSE: Chronic | ICD-10-CM

## 2018-06-27 DIAGNOSIS — J45.21 MILD INTERMITTENT ASTHMA WITH ACUTE EXACERBATION: Primary | ICD-10-CM

## 2018-06-27 LAB — BNP SERPL-MCNC: 47 PG/ML

## 2018-06-27 PROCEDURE — 36415 COLL VENOUS BLD VENIPUNCTURE: CPT | Mod: PN

## 2018-06-27 PROCEDURE — 99999 PR PBB SHADOW E&M-EST. PATIENT-LVL IV: CPT | Mod: PBBFAC,,, | Performed by: FAMILY MEDICINE

## 2018-06-27 PROCEDURE — 83880 ASSAY OF NATRIURETIC PEPTIDE: CPT

## 2018-06-27 PROCEDURE — 99214 OFFICE O/P EST MOD 30 MIN: CPT | Mod: S$GLB,,, | Performed by: FAMILY MEDICINE

## 2018-06-27 PROCEDURE — 80307 DRUG TEST PRSMV CHEM ANLYZR: CPT

## 2018-06-27 PROCEDURE — 3078F DIAST BP <80 MM HG: CPT | Mod: CPTII,S$GLB,, | Performed by: FAMILY MEDICINE

## 2018-06-27 PROCEDURE — 3074F SYST BP LT 130 MM HG: CPT | Mod: CPTII,S$GLB,, | Performed by: FAMILY MEDICINE

## 2018-06-27 RX ORDER — TIOTROPIUM BROMIDE 18 UG/1
18 CAPSULE ORAL; RESPIRATORY (INHALATION) DAILY
Qty: 30 CAPSULE | Refills: 3 | Status: ON HOLD | OUTPATIENT
Start: 2018-06-27 | End: 2018-09-06 | Stop reason: HOSPADM

## 2018-06-27 NOTE — TELEPHONE ENCOUNTER
Patient made aware of the need for the MRI prior to her Neuro Surgery consult. Patient verbalized understanding. The referrals dept will send patient appointment letter reminder for upcoming appointment with Neuro Surgery. No further questions or concerns at this time.

## 2018-06-27 NOTE — PROGRESS NOTES
"Routine Office Visit    Patient Name: Yanni Kaiser    : 1970  MRN: [id number]    Subjective:  Yanni is a 47 y.o. female who presents today for:    1. Asthma  Patient following up today after being seen in the ED for asthma exacerbation.  She states that she doesn't feel much better.  She states that she is using symbicort twice a day and albuterol as needed.  She continues to smoke and is willing to try smoking cessation stating she knows this is not helping her asthma.  She denies any wheezing at this time, but does feel tight.      2.  Intracranial HTN  Patient is seen by neurology for her intracranial HTN and states she has now been told by the ED and neurology that she needs a shunt.  She is requesting a referral to neurosurgery to discuss her options.  She states that she has a spinal tap recently and that the pressure was "very high">      Past Medical History  Past Medical History:   Diagnosis Date    Allergy     Anemia     Asthma     Depression     Diabetes mellitus, type 2     Diabetes with neurologic complications     GERD (gastroesophageal reflux disease)     High cholesterol     Hypertension     Pseudotumor cerebri     Seizures     Sleep apnea     Type 2 diabetes mellitus        Past Surgical History  Past Surgical History:   Procedure Laterality Date    BREAST CYST ASPIRATION       SECTION      CHOLECYSTECTOMY      SINUS SURGERY         Family History  Family History   Problem Relation Age of Onset    Cancer Mother     Hypertension Mother     Diabetes Mother     Stroke Father     Heart disease Father     COPD Father     Heart attack Father     Cancer Maternal Grandmother     Breast cancer Maternal Grandmother        Social History  Social History     Social History    Marital status:      Spouse name: N/A    Number of children: N/A    Years of education: N/A     Occupational History    Not on file.     Social History Main Topics    Smoking " status: Current Every Day Smoker     Packs/day: 1.00     Years: 15.00     Types: Cigarettes     Last attempt to quit: 6/10/2015    Smokeless tobacco: Never Used    Alcohol use No    Drug use: No    Sexual activity: Yes     Partners: Male     Birth control/ protection: None     Other Topics Concern    Not on file     Social History Narrative    No narrative on file       Current Medications  Current Outpatient Prescriptions on File Prior to Visit   Medication Sig Dispense Refill    acetaZOLAMIDE (DIAMOX) 500 mg CpSR Take 1 capsule (500 mg total) by mouth 2 (two) times daily. 360 capsule 1    albuterol 90 mcg/actuation inhaler Inhale 2 puffs into the lungs every 6 (six) hours as needed for Wheezing. Rescue 1 Inhaler 0    albuterol-ipratropium 2.5mg-0.5mg/3mL (DUO-NEB) 0.5 mg-3 mg(2.5 mg base)/3 mL nebulizer solution Take 3 mLs by nebulization every 6 (six) hours as needed for Wheezing or Shortness of Breath. Rescue 100 mL 0    blood sugar diagnostic Strp 1 each by Misc.(Non-Drug; Combo Route) route 4 (four) times daily before meals and nightly. ACCU CHEK ELVIA PLUS METER 200 each 3    blood-glucose meter (ACCU-CHEK ELVIA PLUS METER) Misc TEST FOUR TIMES DAILY BEFORE MEALS AND EVERY NIGHT 90 each 1    budesonide-formoterol 160-4.5 mcg (SYMBICORT) 160-4.5 mcg/actuation HFAA Inhale 2 puffs into the lungs every 12 (twelve) hours. Controller 1 Inhaler 5    buPROPion (WELLBUTRIN XL) 150 MG TB24 tablet Take 1 tablet (150 mg total) by mouth once daily. 30 tablet 0    capsaicin 0.1 % Crea Apply 1 application topically 2 (two) times daily. 56.6 g 1    cetirizine (ZYRTEC) 10 MG tablet Take 1 tablet (10 mg total) by mouth 2 (two) times daily. 60 tablet 3    hydroCHLOROthiazide (HYDRODIURIL) 25 MG tablet Take 1 tablet (25 mg total) by mouth once daily. 30 tablet 11    hydrOXYzine (ATARAX) 50 MG tablet Take 1-4 at night for itching. 120 tablet 3    lancets (ACCU-CHEK SOFTCLIX LANCETS) Misc 1 Device by  Misc.(Non-Drug; Combo Route) route 4 (four) times daily. 200 each 3    lancets 25 gauge Misc 1 lancet by Misc.(Non-Drug; Combo Route) route 4 (four) times daily before meals and nightly. 200 each 11    LINZESS 290 mcg Cap TK ONE CAPSULE PO D ON AN EMPTY STOMACH  5    losartan (COZAAR) 100 MG tablet Take 1 tablet (100 mg total) by mouth once daily. 90 tablet 0    magnesium oxide (MAG-OX) 400 mg tablet Take 1 tablet (400 mg total) by mouth once daily. 90 tablet 3    meloxicam (MOBIC) 15 MG tablet TAKE 1 TABLET(15 MG) BY MOUTH DAILY AS NEEDED FOR HEADACHE 90 tablet 1    meloxicam (MOBIC) 15 MG tablet TAKE 1 TABLET(15 MG) BY MOUTH DAILY AS NEEDED FOR HEADACHE 90 tablet 1    metformin (GLUCOPHAGE-XR) 500 MG 24 hr tablet Take 2 tablets (1,000 mg total) by mouth 2 (two) times daily with meals. 360 tablet 0    mometasone (NASONEX) 50 mcg/actuation nasal spray 2 sprays by Nasal route once daily. 1 each 11    mometasone 0.1% (ELOCON) 0.1 % cream       NOVOLOG FLEXPEN 100 unit/mL InPn pen ADM 15 UNITS SC TID B MEALS  11    oxyCODONE-acetaminophen (PERCOCET)  mg per tablet       pantoprazole (PROTONIX) 40 MG tablet Take 40 mg by mouth once daily.      TAZTIA  mg CpSR TK ONE CAPSULE PO D  2    tiZANidine (ZANAFLEX) 4 MG tablet       topiramate (TOPAMAX) 100 MG tablet Take 3 tablets (300 mg total) by mouth 2 (two) times daily. Please start taking 2 tablet (200 mg) twice a day for the next week. Then increase to additional tablet in evenings (3 tablets at night and 2 in morning) for 1 week. Then increase to 3 tablets in morning and 3 tablets in evening. 90 tablet 11    triamcinolone acetonide 0.1% (KENALOG) 0.1 % ointment       venlafaxine (EFFEXOR-XR) 75 MG 24 hr capsule Take 1 capsule (75 mg total) by mouth every evening. 90 capsule 1    zonisamide (ZONEGRAN) 100 MG Cap TK ONE C PO TID  0    zonisamide (ZONEGRAN) 25 MG Cap   1    zonisamide (ZONEGRAN) 50 MG Cap       propranolol (INDERAL) 20 MG  "tablet Take 1 tablet (20 mg total) by mouth 2 (two) times daily. 60 tablet 0    quetiapine (SEROQUEL) 25 MG Tab Take 1 tablet (25 mg total) by mouth every evening. 30 tablet 0     No current facility-administered medications on file prior to visit.        Allergies   Review of patient's allergies indicates:  No Known Allergies    Review of Systems (Pertinent positives)  Review of Systems   Constitutional: Negative.    HENT: Negative.    Eyes: Negative.    Respiratory: Positive for cough and shortness of breath. Negative for hemoptysis and sputum production.    Cardiovascular: Positive for leg swelling.   Gastrointestinal: Negative.    Musculoskeletal: Positive for joint pain and myalgias.   Skin: Negative.          /73 (BP Location: Right arm, Patient Position: Sitting, BP Method: Large (Automatic))   Pulse 69   Temp 98.1 °F (36.7 °C) (Oral)   Resp (!) 28   Ht 5' 4" (1.626 m)   Wt (!) 165.9 kg (365 lb 11.9 oz)   SpO2 98%   BMI 62.78 kg/m²     GENERAL APPEARANCE: in no apparent distress and well developed and well nourished  HEENT: PERRL, EOMI, Sclera clear, anicteric, Oropharynx clear, no lesions, Neck supple with midline trachea  NECK: normal, supple, no adenopathy, thyroid normal in size  RESPIRATORY: appears well, vitals normal, no respiratory distress, acyanotic, normal RR, chest clear, no wheezing, crepitations, rhonchi, normal symmetric air entry  HEART: regular rate and rhythm, S1, S2 normal, no murmur, click, rub or gallop.    ABDOMEN: abdomen is soft without tenderness, no masses, no hernias, no organomegaly, no rebound, no guarding. Suprapubic tenderness absent. No CVA tenderness.  SKIN: no rashes, no wounds, no other lesions  PSYCH: Alert, oriented x 3, thought content appropriate, speech normal, pleasant and cooperative, slow eye opening when talking    Assessment/Plan:  Yanni Kaiser is a 47 y.o. female who presents today for :    Yanni was seen today for e.r. followup and " edema.    Diagnoses and all orders for this visit:    Mild intermittent asthma with acute exacerbation  -     Ambulatory referral to Smoking Cessation Program  -     tiotropium (SPIRIVA) 18 mcg inhalation capsule; Inhale 1 capsule (18 mcg total) into the lungs once daily. Controller  -     Brain natriuretic peptide; Future    Tobacco abuse  -     Ambulatory referral to Smoking Cessation Program    Chronic prescription opiate use  -     DRUGS OF ABUSE SCREEN, BLOOD; Future    Idiopathic intracranial hypertension  -     Ambulatory consult to Neurosurgery      1.  Labs to be done today  2.  Due to patient behavior in office will get toxicology  3.  Refer to neurosurgery per her request  4.  Referred to smoking cessation per patient request  5.  Follow up as needed    Carlyle Gordillo MD      **Patient has discontinued seeing psych stating it wasn't helping.**

## 2018-06-27 NOTE — TELEPHONE ENCOUNTER
LVM instructing patient to call the clinic in regards to her Neuro surgery referral. Patient will need to have MRI done before she can be seen by the Neuro surgeon. Patient has had 2 appointments to have MRI done. Patient has cancelled both appointments.

## 2018-06-29 LAB
AMPHETAMINES SERPL QL: NEGATIVE
BARBITURATES SERPL QL SCN: POSITIVE
BENZODIAZ SERPL QL: NEGATIVE
CANNABINOIDS SERPL QL: NEGATIVE
COCAINE, BLOOD: NEGATIVE
ETHANOL SERPL-MCNC: NEGATIVE MG/DL
METHADONE SERPL QL SCN: NEGATIVE
OPIATES SERPL QL SCN: NEGATIVE
PCP SERPL QL SCN: NEGATIVE
PROPOXYPH SERPL QL: NEGATIVE

## 2018-07-02 ENCOUNTER — CLINICAL SUPPORT (OUTPATIENT)
Dept: SMOKING CESSATION | Facility: CLINIC | Age: 48
End: 2018-07-02
Payer: COMMERCIAL

## 2018-07-02 DIAGNOSIS — F17.200 NICOTINE DEPENDENCE: Primary | ICD-10-CM

## 2018-07-02 PROCEDURE — 99404 PREV MED CNSL INDIV APPRX 60: CPT | Mod: S$GLB,,,

## 2018-07-02 RX ORDER — IBUPROFEN 200 MG
1 TABLET ORAL DAILY
Qty: 14 PATCH | Refills: 0 | Status: SHIPPED | OUTPATIENT
Start: 2018-07-02 | End: 2018-07-16

## 2018-07-02 NOTE — Clinical Note
Patient will be participating in biweekly tobacco cessation meetings and will begin the prescribed tobacco cessation medication regime of  21 mg nicotine patch QD .  She currently smokes 20 cigarettes per day.  Pt started on rate reduction and wait time of 15 min prior to smoking. Pt's exhaled carbon monoxide level was  9 ppm as per [employer]. (non- smoker = 0-5 ppm.) Will see pt back in office in 2 weeks.

## 2018-07-03 RX ORDER — LOSARTAN POTASSIUM 100 MG/1
TABLET ORAL
Qty: 90 TABLET | Refills: 0 | Status: SHIPPED | OUTPATIENT
Start: 2018-07-03 | End: 2018-10-02 | Stop reason: SDUPTHER

## 2018-07-10 ENCOUNTER — TELEPHONE (OUTPATIENT)
Dept: GASTROENTEROLOGY | Facility: CLINIC | Age: 48
End: 2018-07-10

## 2018-07-11 ENCOUNTER — HOSPITAL ENCOUNTER (OUTPATIENT)
Dept: RADIOLOGY | Facility: HOSPITAL | Age: 48
Discharge: HOME | End: 2018-07-11
Attending: PSYCHIATRY & NEUROLOGY
Payer: MEDICARE

## 2018-07-11 ENCOUNTER — OFFICE VISIT (OUTPATIENT)
Dept: OBSTETRICS AND GYNECOLOGY | Facility: CLINIC | Age: 48
End: 2018-07-11
Payer: MEDICARE

## 2018-07-11 VITALS
HEIGHT: 64 IN | WEIGHT: 293 LBS | DIASTOLIC BLOOD PRESSURE: 86 MMHG | BODY MASS INDEX: 50.02 KG/M2 | SYSTOLIC BLOOD PRESSURE: 124 MMHG

## 2018-07-11 DIAGNOSIS — G93.2 IIH (IDIOPATHIC INTRACRANIAL HYPERTENSION): ICD-10-CM

## 2018-07-11 DIAGNOSIS — N91.2 AMENORRHEA: ICD-10-CM

## 2018-07-11 DIAGNOSIS — G08: ICD-10-CM

## 2018-07-11 DIAGNOSIS — I82.90 THROMBUS: ICD-10-CM

## 2018-07-11 DIAGNOSIS — Z01.419 ENCOUNTER FOR GYNECOLOGICAL EXAMINATION WITHOUT ABNORMAL FINDING: Primary | ICD-10-CM

## 2018-07-11 DIAGNOSIS — R10.2 PELVIC PAIN IN FEMALE: ICD-10-CM

## 2018-07-11 LAB
BILIRUB UR QL STRIP: NEGATIVE
C TRACH DNA SPEC QL NAA+PROBE: NOT DETECTED
CLARITY UR REFRACT.AUTO: CLEAR
COLOR UR AUTO: YELLOW
GLUCOSE UR QL STRIP: NEGATIVE
HGB UR QL STRIP: NEGATIVE
KETONES UR QL STRIP: NEGATIVE
LEUKOCYTE ESTERASE UR QL STRIP: NEGATIVE
N GONORRHOEA DNA SPEC QL NAA+PROBE: NOT DETECTED
NITRITE UR QL STRIP: NEGATIVE
PH UR STRIP: 5 [PH] (ref 5–8)
PROT UR QL STRIP: NEGATIVE
SP GR UR STRIP: 1.03 (ref 1–1.03)
URN SPEC COLLECT METH UR: NORMAL
UROBILINOGEN UR STRIP-ACNC: 2 EU/DL

## 2018-07-11 PROCEDURE — 99999 PR PBB SHADOW E&M-EST. PATIENT-LVL III: CPT | Mod: PBBFAC,,, | Performed by: OBSTETRICS & GYNECOLOGY

## 2018-07-11 PROCEDURE — 87086 URINE CULTURE/COLONY COUNT: CPT

## 2018-07-11 PROCEDURE — 70544 MR ANGIOGRAPHY HEAD W/O DYE: CPT | Mod: 26,,, | Performed by: RADIOLOGY

## 2018-07-11 PROCEDURE — 88175 CYTOPATH C/V AUTO FLUID REDO: CPT

## 2018-07-11 PROCEDURE — G0101 CA SCREEN;PELVIC/BREAST EXAM: HCPCS | Mod: S$GLB,,, | Performed by: OBSTETRICS & GYNECOLOGY

## 2018-07-11 PROCEDURE — 70544 MR ANGIOGRAPHY HEAD W/O DYE: CPT | Mod: TC

## 2018-07-11 PROCEDURE — 81003 URINALYSIS AUTO W/O SCOPE: CPT

## 2018-07-11 PROCEDURE — 87491 CHLMYD TRACH DNA AMP PROBE: CPT

## 2018-07-11 RX ORDER — NAPROXEN 500 MG/1
500 TABLET ORAL 3 TIMES DAILY PRN
Qty: 30 TABLET | Refills: 3 | Status: ON HOLD | OUTPATIENT
Start: 2018-07-11 | End: 2018-09-06 | Stop reason: HOSPADM

## 2018-07-12 LAB — BACTERIA UR CULT: NORMAL

## 2018-07-13 ENCOUNTER — HOSPITAL ENCOUNTER (OUTPATIENT)
Dept: RADIOLOGY | Facility: OTHER | Age: 48
Discharge: HOME | End: 2018-07-13
Attending: OBSTETRICS & GYNECOLOGY
Payer: MEDICARE

## 2018-07-13 DIAGNOSIS — R10.2 PELVIC PAIN IN FEMALE: ICD-10-CM

## 2018-07-13 PROCEDURE — 76856 US EXAM PELVIC COMPLETE: CPT | Mod: 26,,, | Performed by: INTERNAL MEDICINE

## 2018-07-13 PROCEDURE — 76830 TRANSVAGINAL US NON-OB: CPT | Mod: 26,,, | Performed by: INTERNAL MEDICINE

## 2018-07-13 PROCEDURE — 76830 TRANSVAGINAL US NON-OB: CPT | Mod: TC

## 2018-07-13 PROCEDURE — 76856 US EXAM PELVIC COMPLETE: CPT | Mod: TC

## 2018-07-16 ENCOUNTER — TELEPHONE (OUTPATIENT)
Dept: OBSTETRICS AND GYNECOLOGY | Facility: CLINIC | Age: 48
End: 2018-07-16

## 2018-07-16 NOTE — TELEPHONE ENCOUNTER
----- Message from Glenn Black Jr., MD sent at 7/16/2018 12:57 PM CDT -----  CALL WITH [facility] U/S. PROLACTIN HORMONE ELEVATED, WILL REPEAT 12 MONTHS.  [facility] MENOPAUSAL.

## 2018-07-30 ENCOUNTER — OFFICE VISIT (OUTPATIENT)
Dept: FAMILY MEDICINE | Facility: CLINIC | Age: 48
End: 2018-07-30
Payer: MEDICARE

## 2018-07-30 ENCOUNTER — LAB VISIT (OUTPATIENT)
Dept: LAB | Facility: HOSPITAL | Age: 48
End: 2018-07-30
Attending: FAMILY MEDICINE
Payer: MEDICARE

## 2018-07-30 VITALS
TEMPERATURE: 98 F | RESPIRATION RATE: 14 BRPM | HEART RATE: 73 BPM | BODY MASS INDEX: 50.02 KG/M2 | SYSTOLIC BLOOD PRESSURE: 120 MMHG | OXYGEN SATURATION: 96 % | HEIGHT: 64 IN | DIASTOLIC BLOOD PRESSURE: 84 MMHG | WEIGHT: 293 LBS

## 2018-07-30 DIAGNOSIS — N61.1 ABSCESS OF RIGHT BREAST: ICD-10-CM

## 2018-07-30 DIAGNOSIS — E11.8 CONTROLLED TYPE 2 DIABETES MELLITUS WITH COMPLICATION, WITHOUT LONG-TERM CURRENT USE OF INSULIN: Chronic | ICD-10-CM

## 2018-07-30 DIAGNOSIS — M79.10 MYALGIA: ICD-10-CM

## 2018-07-30 DIAGNOSIS — J32.1 CHRONIC FRONTAL SINUSITIS: Primary | ICD-10-CM

## 2018-07-30 PROCEDURE — 36415 COLL VENOUS BLD VENIPUNCTURE: CPT | Mod: PN

## 2018-07-30 PROCEDURE — 3008F BODY MASS INDEX DOCD: CPT | Mod: CPTII,S$GLB,, | Performed by: FAMILY MEDICINE

## 2018-07-30 PROCEDURE — 96372 THER/PROPH/DIAG INJ SC/IM: CPT | Mod: S$GLB,,, | Performed by: FAMILY MEDICINE

## 2018-07-30 PROCEDURE — 83036 HEMOGLOBIN GLYCOSYLATED A1C: CPT

## 2018-07-30 PROCEDURE — 99214 OFFICE O/P EST MOD 30 MIN: CPT | Mod: 25,S$GLB,, | Performed by: FAMILY MEDICINE

## 2018-07-30 PROCEDURE — 3074F SYST BP LT 130 MM HG: CPT | Mod: CPTII,S$GLB,, | Performed by: FAMILY MEDICINE

## 2018-07-30 PROCEDURE — 3044F HG A1C LEVEL LT 7.0%: CPT | Mod: CPTII,S$GLB,, | Performed by: FAMILY MEDICINE

## 2018-07-30 PROCEDURE — 3079F DIAST BP 80-89 MM HG: CPT | Mod: CPTII,S$GLB,, | Performed by: FAMILY MEDICINE

## 2018-07-30 PROCEDURE — 99999 PR PBB SHADOW E&M-EST. PATIENT-LVL IV: CPT | Mod: PBBFAC,,, | Performed by: FAMILY MEDICINE

## 2018-07-30 RX ORDER — DOXYCYCLINE 100 MG/1
100 CAPSULE ORAL EVERY 12 HOURS
Qty: 20 CAPSULE | Refills: 0 | Status: SHIPPED | OUTPATIENT
Start: 2018-07-30 | End: 2018-08-09

## 2018-07-30 RX ORDER — PROMETHAZINE HYDROCHLORIDE 25 MG/ML
25 INJECTION, SOLUTION INTRAMUSCULAR; INTRAVENOUS
Status: COMPLETED | OUTPATIENT
Start: 2018-07-30 | End: 2018-07-30

## 2018-07-30 RX ADMIN — PROMETHAZINE HYDROCHLORIDE 25 MG: 25 INJECTION, SOLUTION INTRAMUSCULAR; INTRAVENOUS at 10:07

## 2018-07-30 NOTE — PROGRESS NOTES
"Routine Office Visit    Patient Name: Yanni Kaiser    : 1970  MRN: [id number]    Subjective:  Yanni is a 47 y.o. female who presents today for:    1. Congestion  Patient presenting today with congestion "for forever".  She states that sh has been taking her daily antihistamine and it is not helping.  She is getting headaches above her left eye that are constant.  She does have asthma and does continue to smoke.  She complains of intermittent dry cough.  No fevers, chills, or sweats.  She complains of feeling swollen constantly and body aches all over.  She states she doesn't know what is causing all of this pain.  She had labs done in the past to evaluate muscle inflammation that were negative.      2.  Right breast bump  Patient complaining of bump under right breast that has drained twice now.  No current drainage.  No fevers, chills, or sweats.  She states she has had abscesses before.  No trauma to the effected area.  She states that it is tender to palpation.      Past Medical History  Past Medical History:   Diagnosis Date    Allergy     Anemia     Asthma     Depression     Diabetes mellitus, type 2     Diabetes with neurologic complications     GERD (gastroesophageal reflux disease)     High cholesterol     Hypertension     Pseudotumor cerebri     Seizures     Sleep apnea     Type 2 diabetes mellitus        Past Surgical History  Past Surgical History:   Procedure Laterality Date    BREAST CYST ASPIRATION       SECTION      CHOLECYSTECTOMY      SINUS SURGERY         Family History  Family History   Problem Relation Age of Onset    Cancer Mother     Hypertension Mother     Diabetes Mother     Stroke Father     Heart disease Father     COPD Father     Heart attack Father     Cancer Maternal Grandmother     Breast cancer Maternal Grandmother        Social History  Social History     Social History    Marital status:      Spouse name: N/A    Number of " children: N/A    Years of education: N/A     Occupational History    Not on file.     Social History Main Topics    Smoking status: Current Every Day Smoker     Packs/day: 1.00     Years: 15.00     Types: Cigarettes    Smokeless tobacco: Never Used    Alcohol use No    Drug use: No    Sexual activity: Yes     Partners: Male     Birth control/ protection: None     Other Topics Concern    Not on file     Social History Narrative    No narrative on file       Current Medications  Current Outpatient Prescriptions on File Prior to Visit   Medication Sig Dispense Refill    acetaZOLAMIDE (DIAMOX) 500 mg CpSR Take 1 capsule (500 mg total) by mouth 2 (two) times daily. 360 capsule 1    albuterol 90 mcg/actuation inhaler Inhale 2 puffs into the lungs every 6 (six) hours as needed for Wheezing. Rescue 1 Inhaler 0    albuterol-ipratropium 2.5mg-0.5mg/3mL (DUO-NEB) 0.5 mg-3 mg(2.5 mg base)/3 mL nebulizer solution Take 3 mLs by nebulization every 6 (six) hours as needed for Wheezing or Shortness of Breath. Rescue 100 mL 0    blood sugar diagnostic Strp 1 each by Misc.(Non-Drug; Combo Route) route 4 (four) times daily before meals and nightly. ACCU CHEK ELVIA PLUS METER 200 each 3    blood-glucose meter (ACCU-CHEK ELVIA PLUS METER) Misc TEST FOUR TIMES DAILY BEFORE MEALS AND EVERY NIGHT 90 each 1    budesonide-formoterol 160-4.5 mcg (SYMBICORT) 160-4.5 mcg/actuation HFAA Inhale 2 puffs into the lungs every 12 (twelve) hours. Controller 1 Inhaler 5    buPROPion (WELLBUTRIN XL) 150 MG TB24 tablet Take 1 tablet (150 mg total) by mouth once daily. 30 tablet 0    capsaicin 0.1 % Crea Apply 1 application topically 2 (two) times daily. 56.6 g 1    cetirizine (ZYRTEC) 10 MG tablet Take 1 tablet (10 mg total) by mouth 2 (two) times daily. 60 tablet 3    hydroCHLOROthiazide (HYDRODIURIL) 25 MG tablet Take 1 tablet (25 mg total) by mouth once daily. 30 tablet 11    hydrOXYzine (ATARAX) 50 MG tablet Take 1-4 at night for  itching. 120 tablet 3    lancets (ACCU-CHEK SOFTCLIX LANCETS) Misc 1 Device by Misc.(Non-Drug; Combo Route) route 4 (four) times daily. 200 each 3    lancets 25 gauge Misc 1 lancet by Misc.(Non-Drug; Combo Route) route 4 (four) times daily before meals and nightly. 200 each 11    LINZESS 290 mcg Cap TK ONE CAPSULE PO D ON AN EMPTY STOMACH  5    losartan (COZAAR) 100 MG tablet TAKE 1 TABLET BY MOUTH EVERY DAY 90 tablet 0    magnesium oxide (MAG-OX) 400 mg tablet Take 1 tablet (400 mg total) by mouth once daily. 90 tablet 3    meloxicam (MOBIC) 15 MG tablet TAKE 1 TABLET(15 MG) BY MOUTH DAILY AS NEEDED FOR HEADACHE 90 tablet 1    metformin (GLUCOPHAGE-XR) 500 MG 24 hr tablet Take 2 tablets (1,000 mg total) by mouth 2 (two) times daily with meals. 360 tablet 0    mometasone (NASONEX) 50 mcg/actuation nasal spray 2 sprays by Nasal route once daily. 1 each 11    mometasone 0.1% (ELOCON) 0.1 % cream       naproxen (NAPROSYN) 500 MG tablet Take 1 tablet (500 mg total) by mouth 3 (three) times daily as needed. 30 tablet 3    NOVOLOG FLEXPEN 100 unit/mL InPn pen ADM 15 UNITS SC TID B MEALS  11    oxyCODONE-acetaminophen (PERCOCET)  mg per tablet       pantoprazole (PROTONIX) 40 MG tablet Take 40 mg by mouth once daily.      TAZTIA  mg CpSR TK ONE CAPSULE PO D  2    tiotropium (SPIRIVA) 18 mcg inhalation capsule Inhale 1 capsule (18 mcg total) into the lungs once daily. Controller 30 capsule 3    tiZANidine (ZANAFLEX) 4 MG tablet       triamcinolone acetonide 0.1% (KENALOG) 0.1 % ointment       venlafaxine (EFFEXOR-XR) 75 MG 24 hr capsule Take 1 capsule (75 mg total) by mouth every evening. 90 capsule 1    zonisamide (ZONEGRAN) 100 MG Cap TK ONE C PO TID  0    zonisamide (ZONEGRAN) 25 MG Cap   1    zonisamide (ZONEGRAN) 50 MG Cap       [DISCONTINUED] meloxicam (MOBIC) 15 MG tablet TAKE 1 TABLET(15 MG) BY MOUTH DAILY AS NEEDED FOR HEADACHE 90 tablet 1    propranolol (INDERAL) 20 MG tablet  "Take 1 tablet (20 mg total) by mouth 2 (two) times daily. 60 tablet 0    quetiapine (SEROQUEL) 25 MG Tab Take 1 tablet (25 mg total) by mouth every evening. 30 tablet 0    topiramate (TOPAMAX) 100 MG tablet Take 3 tablets (300 mg total) by mouth 2 (two) times daily. Please start taking 2 tablet (200 mg) twice a day for the next week. Then increase to additional tablet in evenings (3 tablets at night and 2 in morning) for 1 week. Then increase to 3 tablets in morning and 3 tablets in evening. 90 tablet 11     No current facility-administered medications on file prior to visit.        Allergies   Review of patient's allergies indicates:  No Known Allergies    Review of Systems (Pertinent positives)  Review of Systems   Constitutional: Positive for malaise/fatigue.   HENT: Positive for congestion and sinus pain.    Eyes: Negative.    Respiratory: Positive for cough. Negative for hemoptysis, sputum production, shortness of breath and wheezing.    Cardiovascular: Positive for leg swelling.   Gastrointestinal: Negative.    Musculoskeletal: Positive for myalgias.   Skin:        +bump under right breast         /84 (BP Location: Left arm, Patient Position: Sitting, BP Method: Large (Manual))   Pulse 73   Temp 98.2 °F (36.8 °C) (Oral)   Resp 14   Ht 5' 4" (1.626 m)   Wt (!) 163.9 kg (361 lb 5.3 oz)   SpO2 96%   BMI 62.02 kg/m²     GENERAL APPEARANCE: in no apparent distress and well developed and well nourished  HEENT: PERRL, EOMI, Sclera clear, anicteric, Oropharynx clear, no lesions, Neck supple with midline trachea  NECK: normal, supple, no adenopathy, thyroid normal in size  RESPIRATORY: appears well, vitals normal, no respiratory distress, acyanotic, normal RR, chest clear, no wheezing, crepitations, rhonchi, normal symmetric air entry  HEART: regular rate and rhythm, S1, S2 normal, no murmur, click, rub or gallop.    ABDOMEN: abdomen is soft without tenderness, no masses, no hernias, no organomegaly, no " rebound, no guarding. Suprapubic tenderness absent. No CVA tenderness.  NEUROLOGIC: normal without focal findings, CN II-XII are intact.   Extremities: warm/well perfused.  No abnormal hair patterns.  No clubbing, cyanosis +1 edema in bilateral lower extremities  SKIN: abscess under right breast that is firm. No drainage.  No fluctuance  PSYCH: Alert, oriented x 3, thought content appropriate, speech normal, pleasant and cooperative, good eye contact, well groomed    Assessment/Plan:  Yanni Kaiser is a 47 y.o. female who presents today for :    Yanni was seen today for nausea, headache, sinus problem and cyst.    Diagnoses and all orders for this visit:    Chronic frontal sinusitis  -     doxycycline (VIBRAMYCIN) 100 MG Cap; Take 1 capsule (100 mg total) by mouth every 12 (twelve) hours. for 10 days  -     promethazine injection 25 mg; Inject 1 mL (25 mg total) into the muscle one time.    Controlled type 2 diabetes mellitus with complication, without long-term current use of insulin  -     Hemoglobin A1c; Future    Myalgia  -     Ambulatory referral to Rheumatology    Abscess of right breast      1.  Doxycycline for sinusitis and abscess  2.  Labs to be done today  3.  Refer to myalgias for chronic myalgias  4.  Follow up as needed    Carlyle Gordillo MD

## 2018-07-31 LAB
ESTIMATED AVG GLUCOSE: 120 MG/DL
HBA1C MFR BLD HPLC: 5.8 %

## 2018-08-01 ENCOUNTER — TELEPHONE (OUTPATIENT)
Dept: SMOKING CESSATION | Facility: CLINIC | Age: 48
End: 2018-08-01

## 2018-08-01 NOTE — TELEPHONE ENCOUNTER
[facility]- called to check on patient, no show for intake appointment. Left message to call back to reschedule [phone] or  [phone].

## 2018-08-06 ENCOUNTER — OFFICE VISIT (OUTPATIENT)
Dept: NEUROSURGERY | Facility: CLINIC | Age: 48
End: 2018-08-06
Payer: MEDICARE

## 2018-08-06 VITALS
HEART RATE: 73 BPM | TEMPERATURE: 99 F | BODY MASS INDEX: 62.14 KG/M2 | DIASTOLIC BLOOD PRESSURE: 71 MMHG | SYSTOLIC BLOOD PRESSURE: 141 MMHG | WEIGHT: 293 LBS

## 2018-08-06 DIAGNOSIS — Z72.0 TOBACCO ABUSE: Chronic | ICD-10-CM

## 2018-08-06 DIAGNOSIS — G43.009 MIGRAINE WITHOUT AURA AND WITHOUT STATUS MIGRAINOSUS, NOT INTRACTABLE: Primary | Chronic | ICD-10-CM

## 2018-08-06 DIAGNOSIS — E66.01 MORBID OBESITY WITH BMI OF 60.0-69.9, ADULT: Chronic | ICD-10-CM

## 2018-08-06 PROCEDURE — 3008F BODY MASS INDEX DOCD: CPT | Mod: CPTII,S$GLB,, | Performed by: NEUROLOGICAL SURGERY

## 2018-08-06 PROCEDURE — 3077F SYST BP >= 140 MM HG: CPT | Mod: CPTII,S$GLB,, | Performed by: NEUROLOGICAL SURGERY

## 2018-08-06 PROCEDURE — 3078F DIAST BP <80 MM HG: CPT | Mod: CPTII,S$GLB,, | Performed by: NEUROLOGICAL SURGERY

## 2018-08-06 PROCEDURE — 99204 OFFICE O/P NEW MOD 45 MIN: CPT | Mod: S$GLB,,, | Performed by: NEUROLOGICAL SURGERY

## 2018-08-06 PROCEDURE — 99999 PR PBB SHADOW E&M-EST. PATIENT-LVL III: CPT | Mod: PBBFAC,,, | Performed by: NEUROLOGICAL SURGERY

## 2018-08-06 NOTE — LETTER
August 8, 2018      Carlyle Gordillo MD  [address]  [place] [address]           [facility] - Neurosurgery 7th Fl  [address]  [facility] [address]  Phone: [phone]          Patient: Yanni Kaiser   MR Number: [id number]   YOB: 1970   Date of Visit: 8/6/2018       Dear Dr. Carlyle KELLY Page:    Thank you for referring Yanni Kaiser to me for evaluation. Attached you will find relevant portions of my assessment and plan of care.    If you have questions, please do not hesitate to call me. I look forward to following Yanni Kaiser along with you.    Sincerely,    Armen Hameed MD    Enclosure  CC:  No Recipients    If you would like to receive this communication electronically, please contact [email or web address] or [phone] to request more information on [employer] access.    For providers and/or their staff who would like to refer a patient to [facility], please contact us through our one-stop-shop provider referral line, [facility] , at [phone].    If you feel you have received this communication in error or would no longer like to receive these types of communications, please e-mail [email or web address]

## 2018-08-06 NOTE — PROGRESS NOTES
"History & Physical    I, Charanjit Collins, attest that this documentation has been prepared under the direction and in the presence of Armen Hameed MD.    08/08/2018    Chief Complaint   Patient presents with    Follow-up       History of Present Illness:  Yanni Kaiser is a 48 y.o. patient who presents by referral from Dr. Carlyle Gordillo MD for rule out of pseudotumor.     Patient seen previously by Dr. Moore and Dr. Gordillo for her headaches. Patient's headaches have been present since 2002 when she was hospitalized for seizures and associated pseudotumor. Patient complains of headaches that are mostly 8/10 in intensity but can be 10/10, associated with photophobia and phonophobia and neck pain. The headaches are descried as throbbing and stabbing to the left parietal area. Patient also reports intermittent "foggy" vision despite bifocal glasses. Patient is taking diamox (since 2002), Topamax, bupropion, as well as percocet (prescribed for foot pain).   Patient had an eye exam in 2017 that did not show any papilledema, but blurred disk edges, suggesting prior papilledema. She has had several equivocal spinal taps with normal ICPs. . Most recently patient underwent MRV and was referred to me for further evaluation.     Review of patient's allergies indicates:  No Known Allergies    Current Outpatient Prescriptions   Medication Sig Dispense Refill    acetaZOLAMIDE (DIAMOX) 500 mg CpSR Take 1 capsule (500 mg total) by mouth 2 (two) times daily. 360 capsule 1    albuterol 90 mcg/actuation inhaler Inhale 2 puffs into the lungs every 6 (six) hours as needed for Wheezing. Rescue 1 Inhaler 0    albuterol-ipratropium 2.5mg-0.5mg/3mL (DUO-NEB) 0.5 mg-3 mg(2.5 mg base)/3 mL nebulizer solution Take 3 mLs by nebulization every 6 (six) hours as needed for Wheezing or Shortness of Breath. Rescue 100 mL 0    blood sugar diagnostic Strp 1 each by Misc.(Non-Drug; Combo Route) route 4 (four) times daily before meals " and nightly. ACCU CHEK ELVIA PLUS METER 200 each 3    blood-glucose meter (ACCU-CHEK ELVIA PLUS METER) [facility] TEST FOUR TIMES DAILY BEFORE MEALS AND EVERY NIGHT 90 each 1    budesonide-formoterol 160-4.5 mcg (SYMBICORT) 160-4.5 mcg/actuation HFAA Inhale 2 puffs into the lungs every 12 (twelve) hours. Controller 1 Inhaler 5    buPROPion (WELLBUTRIN XL) 150 MG TB24 tablet Take 1 tablet (150 mg total) by mouth once daily. 30 tablet 0    capsaicin 0.1 % Crea Apply 1 application topically 2 (two) times daily. 56.6 g 1    cetirizine (ZYRTEC) 10 MG tablet Take 1 tablet (10 mg total) by mouth 2 (two) times daily. 60 tablet 3    doxycycline (VIBRAMYCIN) 100 MG Cap Take 1 capsule (100 mg total) by mouth every 12 (twelve) hours. for 10 days 20 capsule 0    hydroCHLOROthiazide (HYDRODIURIL) 25 MG tablet Take 1 tablet (25 mg total) by mouth once daily. 30 tablet 11    hydrOXYzine (ATARAX) 50 MG tablet Take 1-4 at night for itching. 120 tablet 3    lancets (ACCU-CHEK SOFTCLIX LANCETS) Misc 1 Device by Misc.(Non-Drug; Combo Route) route 4 (four) times daily. 200 each 3    lancets 25 gauge Misc 1 lancet by Misc.(Non-Drug; Combo Route) route 4 (four) times daily before meals and nightly. 200 each 11    LINZESS 290 mcg Cap TK ONE CAPSULE PO D ON AN EMPTY STOMACH  5    losartan (COZAAR) 100 MG tablet TAKE 1 TABLET BY MOUTH EVERY DAY 90 tablet 0    magnesium oxide (MAG-OX) 400 mg tablet Take 1 tablet (400 mg total) by mouth once daily. 90 tablet 3    meloxicam (MOBIC) 15 MG tablet TAKE 1 TABLET(15 MG) BY MOUTH DAILY AS NEEDED FOR HEADACHE 90 tablet 1    metformin (GLUCOPHAGE-XR) 500 MG 24 hr tablet Take 2 tablets (1,000 mg total) by mouth 2 (two) times daily with meals. 360 tablet 0    mometasone (NASONEX) 50 mcg/actuation nasal spray 2 sprays by Nasal route once daily. 1 each 11    mometasone 0.1% (ELOCON) 0.1 % cream       naproxen (NAPROSYN) 500 MG tablet Take 1 tablet (500 mg total) by mouth 3 (three) times daily as  needed. 30 tablet 3    NOVOLOG FLEXPEN 100 unit/mL InPn pen ADM 15 UNITS SC TID B MEALS  11    oxyCODONE-acetaminophen (PERCOCET)  mg per tablet       pantoprazole (PROTONIX) 40 MG tablet Take 40 mg by mouth once daily.      TAZTIA  mg CpSR TK ONE CAPSULE PO D  2    tiotropium (SPIRIVA) 18 mcg inhalation capsule Inhale 1 capsule (18 mcg total) into the lungs once daily. Controller 30 capsule 3    tiZANidine (ZANAFLEX) 4 MG tablet       venlafaxine (EFFEXOR-XR) 75 MG 24 hr capsule Take 1 capsule (75 mg total) by mouth every evening. 90 capsule 1    zonisamide (ZONEGRAN) 100 MG Cap TK ONE C PO TID  0    zonisamide (ZONEGRAN) 25 MG Cap   1    zonisamide (ZONEGRAN) 50 MG Cap       propranolol (INDERAL) 20 MG tablet Take 1 tablet (20 mg total) by mouth 2 (two) times daily. 60 tablet 0    quetiapine (SEROQUEL) 25 MG Tab Take 1 tablet (25 mg total) by mouth every evening. 30 tablet 0    topiramate (TOPAMAX) 100 MG tablet Take 3 tablets (300 mg total) by mouth 2 (two) times daily. Please start taking 2 tablet (200 mg) twice a day for the next week. Then increase to additional tablet in evenings (3 tablets at night and 2 in morning) for 1 week. Then increase to 3 tablets in morning and 3 tablets in evening. 90 tablet 11     Current Facility-Administered Medications   Medication Dose Route Frequency Provider Last Rate Last Dose    dexamethasone injection 2 mg  2 mg Other Once Laura Barnes DPM           Past Medical History:   Diagnosis Date    Allergy     Anemia     Asthma     Depression     Diabetes mellitus, type 2     Diabetes with neurologic complications     GERD (gastroesophageal reflux disease)     High cholesterol     Hypertension     Pseudotumor cerebri     Seizures     Sleep apnea     Type 2 diabetes mellitus        Past Surgical History:   Procedure Laterality Date    BREAST CYST ASPIRATION       SECTION      CHOLECYSTECTOMY      SINUS SURGERY         Family  History   Problem Relation Age of Onset    Cancer Mother     Hypertension Mother     Diabetes Mother     Stroke Father     Heart disease Father     COPD Father     Heart attack Father     Cancer Maternal Grandmother     Breast cancer Maternal Grandmother        Social History   Substance Use Topics    Smoking status: Current Every Day Smoker     Packs/day: 1.00     Years: 15.00     Types: Cigarettes    Smokeless tobacco: Current User    Alcohol use No        Review of Systems:  Review of Systems   Constitutional: Negative for fever.   HENT: Negative for sore throat.         Positive for phonophobia.    Eyes: Positive for photophobia.   Respiratory: Negative for shortness of breath.    Cardiovascular: Negative for chest pain.   Gastrointestinal: Negative for nausea.   Genitourinary: Negative for dysuria.   Musculoskeletal: Negative for back pain.   Skin: Negative for rash.   Neurological: Positive for headaches. Negative for weakness.   Hematological: Does not bruise/bleed easily.       Vital Signs (Most Recent)  Temp: 98.8 °F (37.1 °C) (08/06/18 1330)  Pulse: 73 (08/06/18 1330)  BP: (!) 141/71 (08/06/18 1330)     (!) 164.2 kg (362 lb)       Physical Exam:  Physical Exam:    Constitutional: She appears well-developed.     Eyes: Pupils are equal, round, and reactive to light. EOM are normal. Right eye exhibits no discharge. Left eye exhibits no discharge.     Abdominal: Soft.     Skin: Skin displays no rash on trunk and no rash on extremities.     Psych/Behavior: She is alert. She is oriented to person, place, and time.     Musculoskeletal:        Neck: There is no tenderness.        Back: Range of motion is full.        Right Upper Extremities: Range of motion is full. Muscle strength is 5/5. Tone is normal.        Left Upper Extremities: Range of motion is full. Muscle strength is 5/5. Tone is normal.       Right Lower Extremities: Range of motion is full. Muscle strength is 5/5. Tone is normal.         Left Lower Extremities: Range of motion is full. Muscle strength is 5/5. Tone is normal.     Neurological:        Coordination: She has a normal Romberg Test and normal tandem walking coordination.        Sensory: There is no sensory deficit in the trunk. There is no sensory deficit in the extremities.        DTRs: DTRs are DTRS NORMAL AND SYMMETRICnormal and symmetric. Tricep reflexes are 2+ on the right side and 2+ on the left side. Bicep reflexes are 2+ on the right side and 2+ on the left side. Brachioradialis reflexes are 2+ on the right side and 2+ on the left side. Patellar reflexes are 2+ on the right side and 2+ on the left side. Achilles reflexes are 2+ on the right side and 2+ on the left side. She displays no Babinski's sign on the right side. She displays no Babinski's sign on the left side.        Cranial nerves: Cranial nerve(s) II, III, IV, V, VI, VII, VIII, IX, X, XI and XII are intact.     Laboratory  CBC: Reviewed  CMP: Reviewed    Diagnostic Results:  MRI: Reviewed   MRV Brain Without Contrast, dated 711/2018: Reviewed personally and explained them to the patient.   FINDINGS:  No evidence of dural venous sinus thrombosis. No definite tapering at the transverse/sigmoid sinus junctions to suggest increased intracranial pressure (pseudotumor cerebri).      ASSESSMENT/PLAN:       ICD-10-CM ICD-9-CM   1. Migraine without aura and without status migrainosus, not intractable G43.009 346.10   2. Tobacco abuse Z72.0 305.1   3. Morbid obesity with BMI of 60.0-69.9, adult E66.01 278.01    Z68.44 V85.44       PLAN:    1. Rule out for pseudotumor. At this point, she has had several equivocal LPs, all of them indicating normal ICPs. Patient also has had ophthalmology evaluation on 01/17 (showing no papilledema but optic nerve blurring suggesting previous papilledema). Patient has also been taking Diamox since 2002. At this point, considering all of the above I will refer to Ophthalmology for a new fundoscopic  exam. I have also explained that the most definitive way to evaluate if indeed there is high intracranial pressure is to place a bolt monitor and stop the diamox. Risks/benefits of bolt ICP monitor have been explained. Patient is unsure if she wants to proceed with this since I will have to shave part of her head to do the procedure. I do not recommend any further LPs for the same reasons of Dr. Moore notes on 2/22/2018.     2. I spent 35 minutes with the patient, 50% of time in counselingabout the above mentioned issues.    3. I have encouraged the patient to loose weight and to stop smoking. Patient is not interested at this time.     Yanni was seen today for follow-up.    Diagnoses and all orders for this visit:    Migraine without aura and without status migrainosus, not intractable    Tobacco abuse    Morbid obesity with BMI of 60.0-69.9, adult      I, Dr. Armen Hameed, personally performed the services described in this documentation. All medical record entries made by the scribe were at my direction and in my presence.  I have reviewed the chart and agree that the record reflects my personal performance and is accurate and complete. Armen Hameed MD.  11:03 AM 08/08/2018

## 2018-08-07 ENCOUNTER — OFFICE VISIT (OUTPATIENT)
Dept: PODIATRY | Facility: CLINIC | Age: 48
End: 2018-08-07
Payer: MEDICARE

## 2018-08-07 VITALS
SYSTOLIC BLOOD PRESSURE: 136 MMHG | DIASTOLIC BLOOD PRESSURE: 86 MMHG | HEIGHT: 64 IN | WEIGHT: 293 LBS | BODY MASS INDEX: 50.02 KG/M2

## 2018-08-07 DIAGNOSIS — M21.40 PES PLANUS, UNSPECIFIED LATERALITY: ICD-10-CM

## 2018-08-07 DIAGNOSIS — M72.2 PLANTAR FASCIITIS: ICD-10-CM

## 2018-08-07 DIAGNOSIS — E11.49 TYPE II DIABETES MELLITUS WITH NEUROLOGICAL MANIFESTATIONS: Primary | ICD-10-CM

## 2018-08-07 PROCEDURE — 3075F SYST BP GE 130 - 139MM HG: CPT | Mod: CPTII,,, | Performed by: PODIATRIST

## 2018-08-07 PROCEDURE — 3044F HG A1C LEVEL LT 7.0%: CPT | Mod: CPTII,,, | Performed by: PODIATRIST

## 2018-08-07 PROCEDURE — 3079F DIAST BP 80-89 MM HG: CPT | Mod: CPTII,,, | Performed by: PODIATRIST

## 2018-08-07 PROCEDURE — 3008F BODY MASS INDEX DOCD: CPT | Mod: CPTII,,, | Performed by: PODIATRIST

## 2018-08-07 PROCEDURE — 20550 NJX 1 TENDON SHEATH/LIGAMENT: CPT | Mod: S$PBB,LT,, | Performed by: PODIATRIST

## 2018-08-07 PROCEDURE — 99999 PR PBB SHADOW E&M-EST. PATIENT-LVL V: CPT | Mod: PBBFAC,,, | Performed by: PODIATRIST

## 2018-08-07 PROCEDURE — 99212 OFFICE O/P EST SF 10 MIN: CPT | Mod: 25,S$PBB,, | Performed by: PODIATRIST

## 2018-08-07 PROCEDURE — 20550 NJX 1 TENDON SHEATH/LIGAMENT: CPT | Mod: PBBFAC,PO | Performed by: PODIATRIST

## 2018-08-07 RX ADMIN — TRIAMCINOLONE ACETONIDE 20 MG: 40 INJECTION, SUSPENSION INTRA-ARTICULAR; INTRAMUSCULAR at 04:08

## 2018-08-07 RX ADMIN — LIDOCAINE HYDROCHLORIDE 20 MG: 20 INJECTION, SOLUTION EPIDURAL; INFILTRATION; INTRACAUDAL; PERINEURAL at 04:08

## 2018-08-08 ENCOUNTER — OFFICE VISIT (OUTPATIENT)
Dept: RHEUMATOLOGY | Facility: CLINIC | Age: 48
End: 2018-08-08
Payer: MEDICARE

## 2018-08-08 ENCOUNTER — HOSPITAL ENCOUNTER (OUTPATIENT)
Dept: RADIOLOGY | Facility: HOSPITAL | Age: 48
Discharge: HOME | End: 2018-08-08
Attending: INTERNAL MEDICINE
Payer: MEDICARE

## 2018-08-08 VITALS
DIASTOLIC BLOOD PRESSURE: 85 MMHG | SYSTOLIC BLOOD PRESSURE: 153 MMHG | HEIGHT: 64 IN | WEIGHT: 293 LBS | BODY MASS INDEX: 50.02 KG/M2 | HEART RATE: 80 BPM

## 2018-08-08 DIAGNOSIS — M25.50 ARTHRALGIA, UNSPECIFIED JOINT: ICD-10-CM

## 2018-08-08 DIAGNOSIS — M79.10 MYALGIA: ICD-10-CM

## 2018-08-08 DIAGNOSIS — R53.83 FATIGUE, UNSPECIFIED TYPE: ICD-10-CM

## 2018-08-08 DIAGNOSIS — G89.29 CHRONIC NONINTRACTABLE HEADACHE, UNSPECIFIED HEADACHE TYPE: ICD-10-CM

## 2018-08-08 DIAGNOSIS — Z55.9 EDUCATIONAL CIRCUMSTANCE: ICD-10-CM

## 2018-08-08 DIAGNOSIS — G62.9 NEUROPATHY: ICD-10-CM

## 2018-08-08 DIAGNOSIS — M54.42 CHRONIC BILATERAL LOW BACK PAIN WITH LEFT-SIDED SCIATICA: ICD-10-CM

## 2018-08-08 DIAGNOSIS — Z11.4 ENCOUNTER FOR SCREENING FOR HIV: ICD-10-CM

## 2018-08-08 DIAGNOSIS — R29.898 WEAKNESS OF LEFT LEG: ICD-10-CM

## 2018-08-08 DIAGNOSIS — F17.200 CURRENT SMOKER: ICD-10-CM

## 2018-08-08 DIAGNOSIS — M48.00 SPINAL STENOSIS, UNSPECIFIED SPINAL REGION: ICD-10-CM

## 2018-08-08 DIAGNOSIS — R51.9 CHRONIC NONINTRACTABLE HEADACHE, UNSPECIFIED HEADACHE TYPE: ICD-10-CM

## 2018-08-08 DIAGNOSIS — G89.29 CHRONIC BILATERAL LOW BACK PAIN WITH LEFT-SIDED SCIATICA: ICD-10-CM

## 2018-08-08 DIAGNOSIS — M25.50 ARTHRALGIA, UNSPECIFIED JOINT: Primary | ICD-10-CM

## 2018-08-08 DIAGNOSIS — G93.2 IIH (IDIOPATHIC INTRACRANIAL HYPERTENSION): ICD-10-CM

## 2018-08-08 PROCEDURE — 3079F DIAST BP 80-89 MM HG: CPT | Mod: CPTII,GC,S$GLB, | Performed by: INTERNAL MEDICINE

## 2018-08-08 PROCEDURE — 3077F SYST BP >= 140 MM HG: CPT | Mod: CPTII,GC,S$GLB, | Performed by: INTERNAL MEDICINE

## 2018-08-08 PROCEDURE — 77077 JOINT SURVEY SINGLE VIEW: CPT | Mod: 26,,, | Performed by: RADIOLOGY

## 2018-08-08 PROCEDURE — 77077 JOINT SURVEY SINGLE VIEW: CPT | Mod: TC

## 2018-08-08 PROCEDURE — 99999 PR PBB SHADOW E&M-EST. PATIENT-LVL V: CPT | Mod: PBBFAC,GC,, | Performed by: INTERNAL MEDICINE

## 2018-08-08 PROCEDURE — 99204 OFFICE O/P NEW MOD 45 MIN: CPT | Mod: GC,S$GLB,, | Performed by: INTERNAL MEDICINE

## 2018-08-08 PROCEDURE — 3008F BODY MASS INDEX DOCD: CPT | Mod: CPTII,GC,S$GLB, | Performed by: INTERNAL MEDICINE

## 2018-08-08 RX ORDER — TOPIRAMATE 100 MG/1
TABLET, FILM COATED ORAL
Qty: 90 TABLET | Refills: 0 | OUTPATIENT
Start: 2018-08-08

## 2018-08-08 RX ORDER — TRIAMCINOLONE ACETONIDE 40 MG/ML
20 INJECTION, SUSPENSION INTRA-ARTICULAR; INTRAMUSCULAR ONCE
Status: DISCONTINUED | OUTPATIENT
Start: 2018-08-08 | End: 2018-08-08

## 2018-08-08 RX ORDER — DEXAMETHASONE SODIUM PHOSPHATE 4 MG/ML
2 INJECTION, SOLUTION INTRA-ARTICULAR; INTRALESIONAL; INTRAMUSCULAR; INTRAVENOUS; SOFT TISSUE ONCE
Status: DISCONTINUED | OUTPATIENT
Start: 2018-08-08 | End: 2018-09-06 | Stop reason: HOSPADM

## 2018-08-08 RX ORDER — TRIAMCINOLONE ACETONIDE 40 MG/ML
20 INJECTION, SUSPENSION INTRA-ARTICULAR; INTRAMUSCULAR ONCE
Status: COMPLETED | OUTPATIENT
Start: 2018-08-08 | End: 2018-08-07

## 2018-08-08 RX ORDER — LIDOCAINE HYDROCHLORIDE 20 MG/ML
1 INJECTION, SOLUTION EPIDURAL; INFILTRATION; INTRACAUDAL; PERINEURAL ONCE
Status: COMPLETED | OUTPATIENT
Start: 2018-08-08 | End: 2018-08-07

## 2018-08-08 SDOH — SOCIAL DETERMINANTS OF HEALTH (SDOH): PROBLEMS RELATED TO EDUCATION AND LITERACY, UNSPECIFIED: Z55.9

## 2018-08-08 ASSESSMENT — ROUTINE ASSESSMENT OF PATIENT INDEX DATA (RAPID3)
TOTAL RAPID3 SCORE: 8.33
WHEN YOU AWAKENED IN THE MORNING OVER THE LAST WEEK, PLEASE INDICATE THE AMOUNT OF TIME IT TAKES UNTIL YOU ARE AS LIMBER AS YOU WILL BE FOR THE DAY: 4 HOURS
PATIENT GLOBAL ASSESSMENT SCORE: 9
AM STIFFNESS SCORE: 1, YES
PSYCHOLOGICAL DISTRESS SCORE: 6.6
PAIN SCORE: 10
FATIGUE SCORE: 10
MDHAQ FUNCTION SCORE: 1.8

## 2018-08-08 NOTE — PROGRESS NOTES
Subjective:      Patient ID: Yanni Kaiser is a 48 y.o. female.    Chief Complaint: Foot Pain (left worse/ pcp page )    Yanni Kaiser is a 48 y.o. femalewho presents to the clinic for evaluation and treatment of high risk feet. Yanni Kaiser   has a past medical history of Allergy; Anemia; Asthma; Depression; Diabetes mellitus, type 2; Diabetes with neurologic complications; GERD (gastroesophageal reflux disease); High cholesterol; Hypertension; Pseudotumor cerebri; Seizures; Sleep apnea; and Type 2 diabetes mellitus.   The patient's chief complaint is the feeling of swelling to the feet and continuedright heel pain.     8/8/18: Presents for worsening LEFT heel pain. Reports pain to right heel has improved. Currently on percocet prescribed by pain management.     PCP: Carlyle Gordillo MD    Date Last Seen by PCP:   Chief Complaint   Patient presents with    Foot Pain     left worse/ pcp page        Current shoe gear:   Blackfootat shoes with inserts     This patient has documented high risk feet requiring routine maintenance secondary to diabetes mellitis and those secondary complications of diabetes, as mentioned..      Hemoglobin A1C   Date Value Ref Range Status   07/30/2018 5.8 (H) 4.0 - 5.6 % Final     Comment:     ADA Screening Guidelines:  5.7-6.4%  Consistent with prediabetes  >or=6.5%  Consistent with diabetes  High levels of fetal hemoglobin interfere with the HbA1C  assay. Heterozygous hemoglobin variants (HbS, HgC, etc)do  not significantly interfere with this assay.   However, presence of multiple variants may affect accuracy.     01/22/2018 5.8 (H) 4.0 - 5.6 % Final     Comment:     According to ADA guidelines, hemoglobin A1c <7.0% represents  optimal control in non-pregnant diabetic patients. Different  metrics may apply to specific patient populations.   Standards of Medical Care in Diabetes-2016.  For the purpose of screening for the presence of diabetes:  <5.7%      Consistent with the absence of diabetes  5.7-6.4%  Consistent with increasing risk for diabetes   (prediabetes)  >or=6.5%  Consistent with diabetes  Currently, no consensus exists for use of hemoglobin A1c  for diagnosis of diabetes for children.  This Hemoglobin A1c assay has significant interference with fetal   hemoglobin   (HbF). The results are invalid for patients with abnormal amounts of   HbF,   including those with known Hereditary Persistence   of Fetal Hemoglobin. Heterozygous hemoglobin variants (HbAS, HbAC,   HbAD, HbAE, HbA2) do not significantly interfere with this assay;   however, presence of multiple variants in a sample may impact the %   interference.     06/26/2017 6.0 (H) 4.0 - 5.6 % Final     Comment:     According to ADA guidelines, hemoglobin A1c <7.0% represents  optimal control in non-pregnant diabetic patients. Different  metrics may apply to specific patient populations.   Standards of Medical Care in Diabetes-2016.  For the purpose of screening for the presence of diabetes:  <5.7%     Consistent with the absence of diabetes  5.7-6.4%  Consistent with increasing risk for diabetes   (prediabetes)  >or=6.5%  Consistent with diabetes  Currently, no consensus exists for use of hemoglobin A1c  for diagnosis of diabetes for children.  This Hemoglobin A1c assay has significant interference with fetal   hemoglobin   (HbF). The results are invalid for patients with abnormal amounts of   HbF,   including those with known Hereditary Persistence   of Fetal Hemoglobin. Heterozygous hemoglobin variants (HbAS, HbAC,   HbAD, HbAE, HbA2) do not significantly interfere with this assay;   however, presence of multiple variants in a sample may impact the %   interference.       Patient Active Problem List   Diagnosis    Mild intermittent asthma    SHARATH (obstructive sleep apnea)    Leg varices    Low back pain    Seizure disorder    Type 2 diabetes mellitus, controlled    Migraine    Morbid obesity  with BMI of 60.0-69.9, adult    Tobacco abuse    Exacerbation of asthma    Anemia of chronic disease    Mild protein malnutrition    Weakness    Allergic rhinitis    Idiopathic intracranial hypertension    Essential hypertension    Combined hyperlipidemia associated with type 2 diabetes mellitus    Depression    Hyperlipidemia    GERD (gastroesophageal reflux disease)    Epilepsy    Plantar fasciitis, bilateral    Numbness of right hand    Type 2 diabetes mellitus with stage 3 chronic kidney disease, with long-term current use of insulin    Localz-rltd symptomatic epilepsy w cmplx part sz, intract, wo status    Complex partial epilepsy, intractable    Recurrent major depressive disorder, in full remission    Simple chronic bronchitis     Current Outpatient Prescriptions on File Prior to Visit   Medication Sig Dispense Refill    acetaZOLAMIDE (DIAMOX) 500 mg CpSR Take 1 capsule (500 mg total) by mouth 2 (two) times daily. 360 capsule 1    albuterol 90 mcg/actuation inhaler Inhale 2 puffs into the lungs every 6 (six) hours as needed for Wheezing. Rescue 1 Inhaler 0    albuterol-ipratropium 2.5mg-0.5mg/3mL (DUO-NEB) 0.5 mg-3 mg(2.5 mg base)/3 mL nebulizer solution Take 3 mLs by nebulization every 6 (six) hours as needed for Wheezing or Shortness of Breath. Rescue 100 mL 0    blood sugar diagnostic Strp 1 each by Misc.(Non-Drug; Combo Route) route 4 (four) times daily before meals and nightly. ACCU CHEK ELVIA PLUS METER 200 each 3    blood-glucose meter (ACCU-CHEK ELVIA PLUS METER) Misc TEST FOUR TIMES DAILY BEFORE MEALS AND EVERY NIGHT 90 each 1    budesonide-formoterol 160-4.5 mcg (SYMBICORT) 160-4.5 mcg/actuation HFAA Inhale 2 puffs into the lungs every 12 (twelve) hours. Controller 1 Inhaler 5    buPROPion (WELLBUTRIN XL) 150 MG TB24 tablet Take 1 tablet (150 mg total) by mouth once daily. 30 tablet 0    capsaicin 0.1 % Crea Apply 1 application topically 2 (two) times daily. 56.6 g 1     cetirizine (ZYRTEC) 10 MG tablet Take 1 tablet (10 mg total) by mouth 2 (two) times daily. 60 tablet 3    doxycycline (VIBRAMYCIN) 100 MG Cap Take 1 capsule (100 mg total) by mouth every 12 (twelve) hours. for 10 days 20 capsule 0    hydroCHLOROthiazide (HYDRODIURIL) 25 MG tablet Take 1 tablet (25 mg total) by mouth once daily. 30 tablet 11    hydrOXYzine (ATARAX) 50 MG tablet Take 1-4 at night for itching. 120 tablet 3    lancets (ACCU-CHEK SOFTCLIX LANCETS) Misc 1 Device by Misc.(Non-Drug; Combo Route) route 4 (four) times daily. 200 each 3    lancets 25 gauge Misc 1 lancet by Misc.(Non-Drug; Combo Route) route 4 (four) times daily before meals and nightly. 200 each 11    LINZESS 290 mcg Cap TK ONE CAPSULE PO D ON AN EMPTY STOMACH  5    losartan (COZAAR) 100 MG tablet TAKE 1 TABLET BY MOUTH EVERY DAY 90 tablet 0    magnesium oxide (MAG-OX) 400 mg tablet Take 1 tablet (400 mg total) by mouth once daily. 90 tablet 3    meloxicam (MOBIC) 15 MG tablet TAKE 1 TABLET(15 MG) BY MOUTH DAILY AS NEEDED FOR HEADACHE 90 tablet 1    metformin (GLUCOPHAGE-XR) 500 MG 24 hr tablet Take 2 tablets (1,000 mg total) by mouth 2 (two) times daily with meals. 360 tablet 0    mometasone (NASONEX) 50 mcg/actuation nasal spray 2 sprays by Nasal route once daily. 1 each 11    mometasone 0.1% (ELOCON) 0.1 % cream       naproxen (NAPROSYN) 500 MG tablet Take 1 tablet (500 mg total) by mouth 3 (three) times daily as needed. 30 tablet 3    NOVOLOG FLEXPEN 100 unit/mL InPn pen ADM 15 UNITS SC TID B MEALS  11    oxyCODONE-acetaminophen (PERCOCET)  mg per tablet       pantoprazole (PROTONIX) 40 MG tablet Take 40 mg by mouth once daily.      TAZTIA  mg CpSR TK ONE CAPSULE PO D  2    tiotropium (SPIRIVA) 18 mcg inhalation capsule Inhale 1 capsule (18 mcg total) into the lungs once daily. Controller 30 capsule 3    tiZANidine (ZANAFLEX) 4 MG tablet       venlafaxine (EFFEXOR-XR) 75 MG 24 hr capsule Take 1 capsule (75  mg total) by mouth every evening. 90 capsule 1    zonisamide (ZONEGRAN) 100 MG Cap TK ONE C PO TID  0    zonisamide (ZONEGRAN) 25 MG Cap   1    zonisamide (ZONEGRAN) 50 MG Cap       [DISCONTINUED] triamcinolone acetonide 0.1% (KENALOG) 0.1 % ointment       propranolol (INDERAL) 20 MG tablet Take 1 tablet (20 mg total) by mouth 2 (two) times daily. 60 tablet 0    quetiapine (SEROQUEL) 25 MG Tab Take 1 tablet (25 mg total) by mouth every evening. 30 tablet 0    topiramate (TOPAMAX) 100 MG tablet Take 3 tablets (300 mg total) by mouth 2 (two) times daily. Please start taking 2 tablet (200 mg) twice a day for the next week. Then increase to additional tablet in evenings (3 tablets at night and 2 in morning) for 1 week. Then increase to 3 tablets in morning and 3 tablets in evening. 90 tablet 11     No current facility-administered medications on file prior to visit.      Review of patient's allergies indicates:  No Known Allergies  Past Surgical History:   Procedure Laterality Date    BREAST CYST ASPIRATION       SECTION      CHOLECYSTECTOMY      SINUS SURGERY       Family History   Problem Relation Age of Onset    Cancer Mother     Hypertension Mother     Diabetes Mother     Stroke Father     Heart disease Father     COPD Father     Heart attack Father     Cancer Maternal Grandmother     Breast cancer Maternal Grandmother      Social History     Social History    Marital status:      Spouse name: N/A    Number of children: N/A    Years of education: N/A     Occupational History    Not on file.     Social History Main Topics    Smoking status: Current Every Day Smoker     Packs/day: 1.00     Years: 15.00     Types: Cigarettes    Smokeless tobacco: Current User    Alcohol use No    Drug use: No    Sexual activity: Yes     Partners: Male     Birth control/ protection: None     Other Topics Concern    Not on file     Social History Narrative    No narrative on file       "  Review of Systems   Constitution: Negative for chills, fever and weakness.   Cardiovascular: Positive for leg swelling. Negative for chest pain and claudication.   Respiratory: Negative for cough and shortness of breath.    Skin: Positive for dry skin and nail changes. Negative for itching and rash.   Musculoskeletal: Positive for arthritis, joint pain, muscle cramps and myalgias. Negative for falls, joint swelling and muscle weakness.   Gastrointestinal: Negative for diarrhea, nausea and vomiting.   Neurological: Positive for numbness and paresthesias. Negative for tremors.   Psychiatric/Behavioral: Negative for altered mental status and hallucinations.           Objective:       Vitals:    08/07/18 1526   BP: 136/86   Weight: (!) 164.2 kg (361 lb 15.9 oz)   Height: 5' 4" (1.626 m)   PainSc: 10-Worst pain ever   PainLoc: Foot       Physical Exam   Constitutional:   General: Pt. is well-developed, well-nourished, appears stated age, in no acute distress, alert and oriented x 3. No evidence of depression, anxiety, or agitation. Calm, cooperative, and communicative. Appropriate interactions and affect.       Cardiovascular:   Pulses:       Dorsalis pedis pulses are 1+ on the right side, and 1+ on the left side.        Posterior tibial pulses are 1+ on the right side, and 1+ on the left side.   Dorsalis pedis and posterior tibial pulses are diminished Bilaterally. Toes are cool to touch. Feet are warm proximally.There is decreased digital hair. Skin is atrophic, hyperpigmented, and mildly edematous       Musculoskeletal:        Right ankle: She exhibits decreased range of motion and swelling. No tenderness. No lateral malleolus, no medial malleolus, no CF ligament, no head of 5th metatarsal and no proximal fibula tenderness found. Achilles tendon exhibits no pain and no defect.        Left ankle: She exhibits decreased range of motion and swelling. No tenderness. No lateral malleolus, no medial malleolus, no head of " 5th metatarsal and no proximal fibula tenderness found. Achilles tendon exhibits no pain and no defect.        Right foot: There is tenderness (Plantar medial calcaneal tuber. mild pain to palpation of 5th met hase and resistance to eversion). There is normal range of motion.        Left foot: There is tenderness (plantar calcaneal medial tuber. mild pain with palpation of 5th met base and eversion resistance. ). There is normal range of motion.   Biomechanical exam: Pain on palpation bilateral medial calcaneal tubercle at origin of plantar fascia. L>R,  There is equinus deformity bilateral with decreased dorsiflexion at the ankle joint bilateral. No tenderness with compression of heel. Negative tinels sign.     Muscle strength is 5/5 in all groups bilaterally.    Decreased stride, station of gait.  apropulsive toe off.  Increased angle and base of gait.    Patient has hammertoes of digits 2-5 bilateral partially reducible without symptom today.    Decreased first MPJ range of motion both weightbearing and nonweightbearing, no crepitus observed the first MP joint,+ dorsal flag sign. Mild  bunion deformity is observed .    Decreased arch height noted b/l.    Neurological: A sensory deficit is present.   [place]jun 5.07 monofilamant testing is diminished Maciej feet. Sharp/dull sensation diminished Bilaterally.     Paresthesias, and hyperesthesia bilateral feet with no clearly identified trigger or source.     Skin: Skin is warm, dry and intact. No abrasion, no ecchymosis, no lesion and no rash noted. She is not diaphoretic. No cyanosis or erythema. No pallor. Nails show no clubbing.   Toenails 1-5 bilaterally neatly trimmed and painted thickened by 2-3 mm,     Interdigital Spaces clean, dry and without evidence of break in skin integrity    Small < 1cm fissure noted to plantar medial R heel. Stable without signs of infection. No erythema or drainage noted.    Psychiatric: She has a normal mood and affect. Her  speech is normal.   Nursing note and vitals reviewed.            Assessment:       Encounter Diagnoses   Name Primary?    Type II diabetes mellitus with neurological manifestations Yes    Pes planus, unspecified laterality     Plantar fasciitis - Left Foot          Plan:       Yanni was seen today for foot pain.    Diagnoses and all orders for this visit:    Type II diabetes mellitus with neurological manifestations    Pes planus, unspecified laterality    Plantar fasciitis - Left Foot    Other orders  -     dexamethasone injection 2 mg; 0.5 mLs (2 mg total) by Other route once.  -     triamcinolone acetonide injection 20 mg; 0.5 mLs (20 mg total) by Other route once.  -     lidocaine (PF) 20 mg/ml (2%) injection 20 mg; 1 mL (20 mg total) by Other route once.  -     Discontinue: triamcinolone acetonide injection 20 mg; 0.5 mLs (20 mg total) by Other route once.      I counseled the patient on her conditions, their implications and medical management.     - Shoe inspection. Diabetic Foot Education. Patient reminded of the importance of good nutrition and blood sugar control to help prevent podiatric complications of diabetes. Patient instructed on proper foot hygeine. We discussed wearing proper shoe gear, daily foot inspections, never walking without protective shoe gear, never putting sharp instruments to feet.     Discussed different treatment options for foot pain. I gave written and verbal instructions on stretching exercises. Patient expressed understanding. Discussed icing the affected area as needed and also wearing appropriate shoe gear and avoiding flats, slippers, sandals, and going barefoot. My recommendation for OTC supports is [employer]. Patient instructed on adequate icing techniques. Patient should ice the affected area at least once per day x 10 minutes for 10 days . I advised the patient that extra icing would also be beneficial to ensure adequate anti inflammatory effect. We also  discussed cortisone injections and NSAID therapy. She would like to injection today.       Patient would like injection today.  With patient's permission,  a cortisone injection was performed at the Left plantar foot, medial approach, using 3ccs of mixture of (1cc 1% plain Lidocaine : 1cc 0.75% Marcaine plain:  0.5cc kenalog-40 : 0.5cc dexamethasone).   This was well tolerated.         RTC in 3 months, sooner PRN    Laura Barnes DPM
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
## 2018-08-08 NOTE — PATIENT INSTRUCTIONS
Please call [phone] for psychiatry to speak with someone to set up an appointment.

## 2018-08-08 NOTE — PROGRESS NOTES
RHEUMATOLOGY ATTENDING:  Patient presented, personally interviewed and examined, medical records reviewed.  48 yr old lady with multiple morbidities (that include DMII, SHARATH, seizure disorder, MDD, migraine HBP, dyslipidemia, among others), sent for consultation on pain all over. Worse pain is low back (w LSS on CT); has non restorative sleep, fatigue and depression. Denies sxs c/w CTD;  Exam with morbid obesity (BMI 62.55) & multiple tender points; no synovitis.  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  Doubt inflammatory connective tissue disorder  Lumbar spinal stenosis and severe neuroforaminal narrowing on CT LS  Rx: in an abundance of caution we will w/u further with labs and imaging.  Discussed psych evaluation and patient is willing to call and make an appointment..  Suggest consider switching from venlafaxine to duloxetine.  We will contact patient if she needs to be seen again by us.   Findings discussed with patient and Dr. Contreras whose note and assessment I agree with.

## 2018-08-09 DIAGNOSIS — G93.2 IIH (IDIOPATHIC INTRACRANIAL HYPERTENSION): ICD-10-CM

## 2018-08-09 DIAGNOSIS — R51.9 CHRONIC NONINTRACTABLE HEADACHE, UNSPECIFIED HEADACHE TYPE: ICD-10-CM

## 2018-08-09 DIAGNOSIS — G89.29 CHRONIC NONINTRACTABLE HEADACHE, UNSPECIFIED HEADACHE TYPE: ICD-10-CM

## 2018-08-09 RX ORDER — TOPIRAMATE 100 MG/1
300 TABLET, FILM COATED ORAL 2 TIMES DAILY
Qty: 180 TABLET | Refills: 5 | Status: SHIPPED | OUTPATIENT
Start: 2018-08-09 | End: 2018-08-10 | Stop reason: SDUPTHER

## 2018-08-09 NOTE — TELEPHONE ENCOUNTER
----- Message from Herb Gray sent at 8/9/2018 10:32 AM CDT -----  Rx Refill/Request     Is this a Refill or New Rx:  Refill  Rx Name and Strength:  topiramate (TOPAMAX) 100 MG tablet  Preferred Pharmacy with phone number: See below  Communication Preference: [phone]  Additional Information:     [facility] [address] [place] [address] [place] [address] AT [address]  [place] [address]  Phone: [phone] Fax: [phone]

## 2018-08-10 DIAGNOSIS — R51.9 CHRONIC NONINTRACTABLE HEADACHE, UNSPECIFIED HEADACHE TYPE: ICD-10-CM

## 2018-08-10 DIAGNOSIS — G89.29 CHRONIC NONINTRACTABLE HEADACHE, UNSPECIFIED HEADACHE TYPE: ICD-10-CM

## 2018-08-10 DIAGNOSIS — G93.2 IIH (IDIOPATHIC INTRACRANIAL HYPERTENSION): ICD-10-CM

## 2018-08-10 RX ORDER — TOPIRAMATE 100 MG/1
300 TABLET, FILM COATED ORAL 2 TIMES DAILY
Qty: 180 TABLET | Refills: 5 | Status: SHIPPED | OUTPATIENT
Start: 2018-08-10 | End: 2018-08-21 | Stop reason: SDUPTHER

## 2018-08-15 ENCOUNTER — TELEPHONE (OUTPATIENT)
Dept: OBSTETRICS AND GYNECOLOGY | Facility: CLINIC | Age: 48
End: 2018-08-15

## 2018-08-15 NOTE — TELEPHONE ENCOUNTER
----- Message from Echo Jeffers sent at 8/15/2018  4:38 PM CDT -----  Contact: self  Pt needing a call back, she is having some bleeding and have questions and/or concerns and can be reached at [phone].

## 2018-08-15 NOTE — TELEPHONE ENCOUNTER
Spoke with pt. Pt states she has been bleeding today. When pt's wipes she is bleeding. Not heavy. Pt offered appt tomorrow at [facility] location. Pt verbalized understanding and appt booked.

## 2018-08-16 ENCOUNTER — TELEPHONE (OUTPATIENT)
Dept: RHEUMATOLOGY | Facility: HOSPITAL | Age: 48
End: 2018-08-16

## 2018-08-16 ENCOUNTER — OFFICE VISIT (OUTPATIENT)
Dept: OBSTETRICS AND GYNECOLOGY | Facility: CLINIC | Age: 48
End: 2018-08-16
Payer: MEDICARE

## 2018-08-16 VITALS
SYSTOLIC BLOOD PRESSURE: 126 MMHG | BODY MASS INDEX: 50.02 KG/M2 | WEIGHT: 293 LBS | DIASTOLIC BLOOD PRESSURE: 92 MMHG | HEIGHT: 64 IN

## 2018-08-16 DIAGNOSIS — N93.8 DUB (DYSFUNCTIONAL UTERINE BLEEDING): Primary | ICD-10-CM

## 2018-08-16 LAB
B-HCG UR QL: NEGATIVE
CTP QC/QA: YES

## 2018-08-16 PROCEDURE — 99999 PR PBB SHADOW E&M-EST. PATIENT-LVL III: CPT | Mod: PBBFAC,,, | Performed by: OBSTETRICS & GYNECOLOGY

## 2018-08-16 PROCEDURE — 88305 TISSUE EXAM BY PATHOLOGIST: CPT | Performed by: PATHOLOGY

## 2018-08-16 PROCEDURE — 3008F BODY MASS INDEX DOCD: CPT | Mod: CPTII,S$GLB,, | Performed by: OBSTETRICS & GYNECOLOGY

## 2018-08-16 PROCEDURE — 3074F SYST BP LT 130 MM HG: CPT | Mod: CPTII,S$GLB,, | Performed by: OBSTETRICS & GYNECOLOGY

## 2018-08-16 PROCEDURE — 88305 TISSUE EXAM BY PATHOLOGIST: CPT | Mod: 26,,, | Performed by: PATHOLOGY

## 2018-08-16 PROCEDURE — 99214 OFFICE O/P EST MOD 30 MIN: CPT | Mod: 25,S$GLB,, | Performed by: OBSTETRICS & GYNECOLOGY

## 2018-08-16 PROCEDURE — 81025 URINE PREGNANCY TEST: CPT | Mod: S$GLB,,, | Performed by: OBSTETRICS & GYNECOLOGY

## 2018-08-16 PROCEDURE — 3080F DIAST BP >= 90 MM HG: CPT | Mod: CPTII,S$GLB,, | Performed by: OBSTETRICS & GYNECOLOGY

## 2018-08-16 PROCEDURE — 58100 BIOPSY OF UTERUS LINING: CPT | Mod: S$GLB,,, | Performed by: OBSTETRICS & GYNECOLOGY

## 2018-08-16 RX ORDER — HYDROXYZINE HYDROCHLORIDE 25 MG/1
TABLET, FILM COATED ORAL
Refills: 3 | Status: ON HOLD | COMMUNITY
Start: 2018-08-08 | End: 2018-09-06 | Stop reason: HOSPADM

## 2018-08-16 NOTE — PROCEDURES
Biopsy (Gynecological)  Date/Time: 2018 2:08 PM  Performed by: Glenn Black Jr., MD  Authorized by: Glenn Black Jr., MD     Consent Done?:  Yes (Written)  Local anesthesia used?: No      Biopsy Location:  Uterus  Estimated blood loss (cc):  0   Patient tolerated the procedure well with no immediate complications.        CC: ENDOMETRIAL BIOPSPY    Yanni Kaiser is a 48 y.o. female  presents for an endometrial biopsy secondary to AUB.   UPT is negative.    PRE ENDOMETRIAL BIOPSY COUNSELING:  The patient was informed of the risk of bleeding, infection, uterine perforation and pain and that the test will rule-out endometrial cancer with accuracy greater than 95%. She was counseled on the alternatives to endometrial biopsy and agrees to proceed.    TIME OUT PERFORMED.  The cervix was visualized with a speculum.  A single tooth tenaculum was placed on the anterior lip prior to the biopsy.  A sterile endometrial pipelle was passed without difficulty to a depth of 4 cm.  Scant endometrial tissue was obtained.    The specimen was placed in formalyn and sent to Pathology for histology evaluation. The patient tolerated the procedure well    ASSESSMENT: AUB    POST ENDOMETRIAL BIOPSY COUNSELING:  Manage post biopsy cramping with NSAIDs or Tyleno.  Expect spotting or light bleeding for a few days.  Report bleeding heavier than a period, fever > 101.0 F, worsening pain or a foul smelling vaginal discharge.    Counseling lasted approximately 15 minutes and all her questions were answered.    FOLLOW-UP: Pending biopsy results.

## 2018-08-16 NOTE — TELEPHONE ENCOUNTER
Spoke with patient on the phone and relayed labs and clinical picture not consistent with connective tissue disease.  Most likely cause of pain is central sensitivity. Pt informed to take vitamin D3 7072-5416 units a day for her low vitamin D. patient to follow up with psych to consider switching to cymbalta and follow up with her PCP.

## 2018-08-21 ENCOUNTER — OFFICE VISIT (OUTPATIENT)
Dept: SLEEP MEDICINE | Facility: CLINIC | Age: 48
End: 2018-08-21
Payer: MEDICARE

## 2018-08-21 ENCOUNTER — OFFICE VISIT (OUTPATIENT)
Dept: NEUROLOGY | Facility: CLINIC | Age: 48
End: 2018-08-21
Payer: MEDICARE

## 2018-08-21 VITALS
HEIGHT: 64 IN | WEIGHT: 293 LBS | SYSTOLIC BLOOD PRESSURE: 162 MMHG | BODY MASS INDEX: 50.02 KG/M2 | HEART RATE: 75 BPM | DIASTOLIC BLOOD PRESSURE: 103 MMHG

## 2018-08-21 DIAGNOSIS — G47.33 OSA (OBSTRUCTIVE SLEEP APNEA): Primary | ICD-10-CM

## 2018-08-21 DIAGNOSIS — G89.29 CHRONIC NONINTRACTABLE HEADACHE, UNSPECIFIED HEADACHE TYPE: ICD-10-CM

## 2018-08-21 DIAGNOSIS — R51.9 CHRONIC NONINTRACTABLE HEADACHE, UNSPECIFIED HEADACHE TYPE: ICD-10-CM

## 2018-08-21 DIAGNOSIS — G43.009 MIGRAINE WITHOUT AURA AND WITHOUT STATUS MIGRAINOSUS, NOT INTRACTABLE: Primary | Chronic | ICD-10-CM

## 2018-08-21 DIAGNOSIS — G47.33 OSA (OBSTRUCTIVE SLEEP APNEA): ICD-10-CM

## 2018-08-21 DIAGNOSIS — G89.29 OTHER CHRONIC PAIN: ICD-10-CM

## 2018-08-21 DIAGNOSIS — T14.91XA SUICIDE ATTEMPT: ICD-10-CM

## 2018-08-21 PROBLEM — G40.219 COMPLEX PARTIAL EPILEPSY, INTRACTABLE: Status: RESOLVED | Noted: 2017-07-06 | Resolved: 2018-08-21

## 2018-08-21 PROBLEM — G40.219 LOCALZ-RLTD SYMPTOMATIC EPILEPSY W CMPLX PART SZ, INTRACT, WO STATUS: Status: RESOLVED | Noted: 2017-07-10 | Resolved: 2018-08-21

## 2018-08-21 PROCEDURE — 99214 OFFICE O/P EST MOD 30 MIN: CPT | Mod: S$GLB,,, | Performed by: PSYCHIATRY & NEUROLOGY

## 2018-08-21 PROCEDURE — 99999 PR PBB SHADOW E&M-EST. PATIENT-LVL III: CPT | Mod: PBBFAC,,, | Performed by: PSYCHIATRY & NEUROLOGY

## 2018-08-21 PROCEDURE — 3080F DIAST BP >= 90 MM HG: CPT | Mod: CPTII,S$GLB,, | Performed by: PSYCHIATRY & NEUROLOGY

## 2018-08-21 PROCEDURE — 3008F BODY MASS INDEX DOCD: CPT | Mod: CPTII,S$GLB,, | Performed by: PSYCHIATRY & NEUROLOGY

## 2018-08-21 PROCEDURE — 99204 OFFICE O/P NEW MOD 45 MIN: CPT | Mod: S$GLB,,, | Performed by: PSYCHIATRY & NEUROLOGY

## 2018-08-21 PROCEDURE — 99499 UNLISTED E&M SERVICE: CPT | Mod: S$GLB,,, | Performed by: PSYCHIATRY & NEUROLOGY

## 2018-08-21 PROCEDURE — 3077F SYST BP >= 140 MM HG: CPT | Mod: CPTII,S$GLB,, | Performed by: PSYCHIATRY & NEUROLOGY

## 2018-08-21 RX ORDER — SUMATRIPTAN 50 MG/1
TABLET, FILM COATED ORAL
Qty: 9 TABLET | Refills: 5 | Status: SHIPPED | OUTPATIENT
Start: 2018-08-21 | End: 2019-01-11 | Stop reason: SDUPTHER

## 2018-08-21 RX ORDER — TOPIRAMATE 100 MG/1
300 TABLET, FILM COATED ORAL 2 TIMES DAILY
Qty: 180 TABLET | Refills: 5 | Status: SHIPPED | OUTPATIENT
Start: 2018-08-21 | End: 2019-04-02 | Stop reason: SDUPTHER

## 2018-08-21 RX ORDER — LANOLIN ALCOHOL/MO/W.PET/CERES
400 CREAM (GRAM) TOPICAL DAILY
Qty: 90 TABLET | Refills: 3 | Status: SHIPPED | OUTPATIENT
Start: 2018-08-21 | End: 2018-09-10 | Stop reason: ALTCHOICE

## 2018-08-21 NOTE — PROGRESS NOTES
"[facility] - NEUROLOGY  [facility] [place]    Date: August 21, 2018   Patient Name: Yanni Kaiser   MRN: [id number]   PCP: Carlyle Gordillo  Referring Provider: No ref. provider found    Assessment:      This is Yanni Kaiser, 48 y.o. female with morbid obesity, history of IIH, and seizures.  LP  3/2016 with OP 24, 1/2017 with OP 14, 6/2018 with OP 29.5 although it should be noted that the patient was not positioned appropriately for accurate measurement of ICP.  Optho exam 1/2017 without evidence of ongoing increased ICP.  I would recommend against repeating LP in the future unless there is some indication to suspect change in ICP, repeated LP will put her at risk for low pressure headache and further back pain.  Ongoing headache more likely related to migraines with contribution of untreated SHARATH.  We discussed treatment for migraines including oral pps medication, botox, and Aimovig.    She had a non-diagnostic EMU admit 7/2017, low suspicion for epilepsy.    Plan:      Start aimovig  Referral to sleep medicine  MRI lumbar  Continue diamox to 1000mg bid  Continue TPM 300mg bid, Mg supplment  Will need repeat visual fields and NFL testing per neurosurgery  Imitrex, mobic, and zanaflex prn    Follow up 3 months       I discussed side effects of the medications. I asked the patient to stop the medication if she notices serious adverse effects as we discussed and to seek immediate medical attention at an ER.     Riccardo Moore MD  [facility]   Department of Neurology    Subjective:   Reports daily left sided headache with scalp sensitivity for the past several months, reports she presents back early after being by rheumatology that diffuse pain is due to "nerves"    Patient checked in 15 minutes past time for 20 minute appointment    6/2018  Patient has had severe worsening of asthma for the past two weeks and underwent LP when she presented to the ED for " "shortness of breath two days  Reports headache with pain on eye movement  Unable to speak in full sentences  Reports she has issues with "too much fluid" causing back pain among other issues and states this is responsible for her weight    2018  Presents today due to left periorbital headache with +photo/phonophobia and nausea beginning two days ago, has taken tylenol without relief.  Reports this will happen about once a month.    2017  ED presentation for syncopal episode 2017, admit for DKA 2017 which she attributes to steroids for asthma exacerbation  Reports staring spell about every two weeks, unsure when last convulsion was  Continues with headaches "every other day", takes daily tylenol and excedrine migraine about twice a week  Unsure what medication has been most helpful for headache    2017  Baseline 5/10 headache with L>R shooting pains and photophobia.  Currently taking diamox, TPM, GBP, LTG although not sure of doses.  Endorses poor sleep due to frequent awakenings to urinate  Reports initial seizure was GTC on Julian Gr[place] day 2002.  Reports GTC typically occur from sleep but none in over a year.  Reports staring spells but does not know how frequent they are.  Previously on keppra.    PAST MEDICAL HISTORY:  Past Medical History:   Diagnosis Date    Allergy     Anemia     Asthma     Depression     Diabetes mellitus, type 2     Diabetes with neurologic complications     GERD (gastroesophageal reflux disease)     High cholesterol     Hyperprolactinemia     Hypertension     Pseudotumor cerebri     Seizures     Sleep apnea     Type 2 diabetes mellitus        PAST SURGICAL HISTORY:  Past Surgical History:   Procedure Laterality Date    BREAST CYST ASPIRATION       SECTION      X 1    CHOLECYSTECTOMY      SINUS SURGERY         CURRENT MEDS:  Current Outpatient Medications   Medication Sig Dispense Refill    acetaZOLAMIDE (DIAMOX) 500 mg CpSR Take 1 capsule (500 mg total) " by mouth 2 (two) times daily. 360 capsule 1    albuterol 90 mcg/actuation inhaler Inhale 2 puffs into the lungs every 6 (six) hours as needed for Wheezing. Rescue 1 Inhaler 0    albuterol-ipratropium 2.5mg-0.5mg/3mL (DUO-NEB) 0.5 mg-3 mg(2.5 mg base)/3 mL nebulizer solution Take 3 mLs by nebulization every 6 (six) hours as needed for Wheezing or Shortness of Breath. Rescue 100 mL 0    blood sugar diagnostic Strp 1 each by Misc.(Non-Drug; Combo Route) route 4 (four) times daily before meals and nightly. ACCU CHEK ELVIA PLUS METER 200 each 3    blood-glucose meter (ACCU-CHEK ELVIA PLUS METER) [facility] [place] TEST FOUR TIMES DAILY BEFORE MEALS AND EVERY NIGHT 90 each 1    budesonide-formoterol 160-4.5 mcg (SYMBICORT) 160-4.5 mcg/actuation HFAA Inhale 2 puffs into the lungs every 12 (twelve) hours. Controller 1 Inhaler 5    buPROPion (WELLBUTRIN XL) 150 MG TB24 tablet Take 1 tablet (150 mg total) by mouth once daily. 30 tablet 0    capsaicin 0.1 % Crea Apply 1 application topically 2 (two) times daily. 56.6 g 1    cetirizine (ZYRTEC) 10 MG tablet Take 1 tablet (10 mg total) by mouth 2 (two) times daily. 60 tablet 3    hydroCHLOROthiazide (HYDRODIURIL) 25 MG tablet Take 1 tablet (25 mg total) by mouth once daily. 30 tablet 11    hydrOXYzine (ATARAX) 50 MG tablet Take 1-4 at night for itching. 120 tablet 3    hydrOXYzine HCl (ATARAX) 25 MG tablet TK 1 TO 8 TS PO HS. START WITH 3 TS THEN INCREASE OR DECREASE AS NEEDED UNTIL ITCHING IS CONTROLLED OR MAX OF 8 TABLETS  3    lancets (ACCU-CHEK SOFTCLIX LANCETS) Misc 1 Device by Misc.(Non-Drug; Combo Route) route 4 (four) times daily. 200 each 3    lancets 25 gauge Misc 1 lancet by Misc.(Non-Drug; Combo Route) route 4 (four) times daily before meals and nightly. 200 each 11    LINZESS 290 mcg Cap TK ONE CAPSULE PO D ON AN EMPTY STOMACH  5    losartan (COZAAR) 100 MG tablet TAKE 1 TABLET BY MOUTH EVERY DAY 90 tablet 0    magnesium oxide (MAG-OX) 400 mg tablet Take 1 tablet  (400 mg total) by mouth once daily. 90 tablet 3    meloxicam (MOBIC) 15 MG tablet TAKE 1 TABLET(15 MG) BY MOUTH DAILY AS NEEDED FOR HEADACHE 90 tablet 1    metformin (GLUCOPHAGE-XR) 500 MG 24 hr tablet Take 2 tablets (1,000 mg total) by mouth 2 (two) times daily with meals. 360 tablet 0    mometasone (NASONEX) 50 mcg/actuation nasal spray 2 sprays by Nasal route once daily. 1 each 11    mometasone 0.1% (ELOCON) 0.1 % cream       naproxen (NAPROSYN) 500 MG tablet Take 1 tablet (500 mg total) by mouth 3 (three) times daily as needed. 30 tablet 3    NOVOLOG FLEXPEN 100 unit/mL InPn pen ADM 15 UNITS SC TID B MEALS  11    oxyCODONE-acetaminophen (PERCOCET)  mg per tablet       pantoprazole (PROTONIX) 40 MG tablet Take 40 mg by mouth once daily.      propranolol (INDERAL) 20 MG tablet Take 1 tablet (20 mg total) by mouth 2 (two) times daily. 60 tablet 0    quetiapine (SEROQUEL) 25 MG Tab Take 1 tablet (25 mg total) by mouth every evening. 30 tablet 0    TAZTIA  mg CpSR TK ONE CAPSULE PO D  2    tiotropium (SPIRIVA) 18 mcg inhalation capsule Inhale 1 capsule (18 mcg total) into the lungs once daily. Controller 30 capsule 3    tiZANidine (ZANAFLEX) 4 MG tablet       topiramate (TOPAMAX) 100 MG tablet Take 3 tablets (300 mg total) by mouth 2 (two) times daily. 180 tablet 5    venlafaxine (EFFEXOR-XR) 75 MG 24 hr capsule Take 1 capsule (75 mg total) by mouth every evening. 90 capsule 1    zonisamide (ZONEGRAN) 100 MG Cap TK ONE C PO TID  0    zonisamide (ZONEGRAN) 25 MG Cap   1    zonisamide (ZONEGRAN) 50 MG Cap        Current Facility-Administered Medications   Medication Dose Route Frequency Provider Last Rate Last Dose    dexamethasone injection 2 mg  2 mg Other Once Laura Barnes DPM           ALLERGIES:  Review of patient's allergies indicates:  No Known Allergies    FAMILY HISTORY:  Family History   Problem Relation Age of Onset    Cancer Mother     Hypertension Mother     Diabetes  Mother     Stroke Father     Heart disease Father     COPD Father     Heart attack Father     Cancer Maternal Grandmother     Breast cancer Maternal Grandmother        SOCIAL HISTORY:  Social History     Tobacco Use    Smoking status: Current Every Day Smoker     Packs/day: 1.00     Years: 15.00     Pack years: 15.00     Types: Cigarettes    Smokeless tobacco: Current User   Substance Use Topics    Alcohol use: No     Alcohol/week: 0.0 oz    Drug use: No       Review of Systems:  12 review of systems is negative except for the symptoms mentioned in HPI.        Objective:     There were no vitals filed for this visit.    General: + distraught  Eyes: no tearing, discharge, no erythema   ENT: moist mucous membranes of the oral cavity, nares patent    Neck: Supple, full range of motion  Cardiovascular: Warm and well perfused, pulses equal and symmetrical  Lungs: decreased air movement in left lung  Skin: No rash, lesions, or breakdown on exposed skin  Psychiatry: frequently closes eyes, appears very apathetic   Abdomen: soft, non tender, non distended  Extremeties: No cyanosis, clubbing or edema.    Neurological   MENTAL STATUS: Alert and oriented to person, place, and time. Attention and concentration within normal limits. Speech without dysarthria, able to name and repeat without difficulty. Recent and remote memory within normal limits   CRANIAL NERVES: Visual fields intact. PERRL, unable to visualize fundus. EOMI. Facial sensation intact. Face symmetrical. Hearing grossly intact. Full shoulder shrug bilaterally. Tongue protrudes midline.    SENSORY: Sensation is intact to light touch throughout.  MOTOR: Normal bulk and tone. No pronator drift.  Give-away weakness throughout but 5/5 deltoid, biceps, triceps, interosseous, hand  bilaterally. 5/5 iliopsoas, knee extension/flexion, foot dorsi/plantarflexion bilaterally.  Variable postural tremor of bilateral upper  extremities.  CEREBELLAR/COORDINATION/GAIT: Gait steady with normal arm swing and stride length.

## 2018-08-21 NOTE — PATIENT INSTRUCTIONS
YOU WILL BE CONTACTED ABOUT STARTING AIMOVIG MONTHLY INJECTIONS FOR MIGRAINE    RESUME TOPMAX 300MG TWICE DAILY AND MAGNESIUM 400MG DAILY    FOLLOW UP FOR MRI LUMBAR SPINE    FOLLOW UP WITH SLEEP MEDICINE TO DISCUSS CPAP AS SLEEP APNEA IS LIKELY CONTRIBUTING TO YOUR HEADACHES    USE IMITREX AS NEEDED

## 2018-08-21 NOTE — PROGRESS NOTES
Yanni Kaiser  was seen at the request of  Self, Aaareferral for sleep evaluation.    08/21/2018 INITIAL HISTORY OF PRESENT ILLNESS:  Yanni Kaiser is a 48 y.o. female is here to be evaluated for a sleep disorder.       CHIEF COMPLAINT:      The patient's complaints include excessive daytime sleepiness, excessive daytime fatigue, snoring,  witnessed breathing pauses,  gasping for air in sleep and interrupted sleep.  She was fisrt diagnosed with SHARATH over 10 years ago; most recent study 2-3 years ago in [place] ([facility]). She could not tolerate CPAp during titration and did not follow up.  Still reports significant daytime sleepiness. At some point she saw Dr. Harrell - for asthma. PSG was requested from [facility], but I do not see it in [facility].  The patient is very reluctant to repeat a sleep study (found the experience traumatic), and would like us to keep trying to obtain the results from [facility].       EPWORTH SLEEPINESS SCALE 8/21/2018   Sitting and reading 3   Watching TV 2   Sitting, inactive in a public place (e.g. a theatre or a meeting) 1   As a passenger in a car for an hour without a break 3   Lying down to rest in the afternoon when circumstances permit 2   Sitting and talking to someone 1   Sitting quietly after a lunch without alcohol 3   In a car, while stopped for a few minutes in traffic 2   Total score 17       SLEEP ROUTINE AND LIFESTYLE 08/21/2018 :  Sleep Clinic New Patient 8/21/2018   What time do you go to bed on a week day? (Give a range) 930-10pm   What time do you go to bed on a day off? (Give a range) 930-10pm   How long does it take you to fall asleep? (Give a range) instant-never   On average, how many times per night do you wake up? 3-4   How long does it take you to fall back into sleep? (Give a range) instant   What time do you wake up to start your day on a week day? (Give a range) 4am   What time do you wake up to start your day on a day off? (Give a  range) 7-8am   What time do you get out of bed? (Give a range) 7-8   On average, how many hours do you sleep? 4-5   On average, how many naps do you take per day? 0   Rate your sleep quality from 0 to 5 (0-poor, 5-great). 3   Have you experienced:  Weight gain?   How much weight have you lost or gained (in lbs.) in the last year? 10   On average, how many times per night do you go to the bathroom?  3   Have you ever had a sleep study/CPAP machine/surgery for sleep apnea? Yes   Have you ever had a CPAP machine for sleep apnea? Yes   Have you ever had surgery for sleep apnea? No       Sleep Clinic ROS  8/21/2018   Difficulty breathing through the nose?  Sometimes   Sore throat or dry mouth in the morning? Yes   Irregular or very fast heart beat?  No   Shortness of breath?  Yes   Acid reflux? Yes   Body aches and pains?  Yes   Morning headaches? Yes   Dizziness? Yes   Mood changes?  Yes   Do you exercise?  No   Do you feel like moving your legs a lot?  Yes           Social History:      Occupation:  Bed partner:        PREVIOUS SLEEP STUDIES:   Trying to obtainfrom [facility].      DME:       PAST MEDICAL HISTORY:    Active Ambulatory Problems     Diagnosis Date Noted    Mild intermittent asthma 03/27/2014    SHARATH (obstructive sleep apnea) 03/27/2014    Leg varices 03/27/2014    Low back pain 05/08/2014    Type 2 diabetes mellitus, controlled 09/03/2014    Migraine 09/03/2014    Morbid obesity with BMI of 60.0-69.9, adult 09/03/2014    Tobacco abuse 09/03/2014    Exacerbation of asthma 06/09/2015    Anemia of chronic disease 06/10/2015    Mild protein malnutrition 06/10/2015    Weakness 06/28/2015    Allergic rhinitis 02/04/2016    Idiopathic intracranial hypertension 02/04/2016    Essential hypertension 02/04/2016    Combined hyperlipidemia associated with type 2 diabetes mellitus 02/04/2016    Depression 04/29/2016    Hyperlipidemia 07/30/2016    GERD (gastroesophageal  reflux disease) 2016    Epilepsy 2016    Plantar fasciitis, bilateral 2017    Numbness of right hand 2017    Type 2 diabetes mellitus with stage 3 chronic kidney disease, with long-term current use of insulin 2017    Recurrent major depressive disorder, in full remission 2018    Simple chronic bronchitis 2018    Other chronic pain 2018    Suicide attempt 2018     Resolved Ambulatory Problems     Diagnosis Date Noted    Body mass index 60.0-69.9, adult 2014    Essential hypertension, benign 2014    Body mass index 60.0-69.9, adult 2014    Type 2 diabetes mellitus with renal manifestations, controlled 2014    Chronic kidney disease, stage II (mild) 2014    Seizure disorder 2014    Acute chest pain 2016    Altered mental status 2016    Seizure 2016    Papilledema associated with increased intracranial pressure 2017    Diabetic ketoacidosis without coma associated with type 2 diabetes mellitus 2017    DUSTIN (acute kidney injury) 2017    Muscle tightness 2017    Muscle weakness 2017    Localz-rltd symptomatic epilepsy w cmplx part sz, intract, wo status 07/10/2017    Complex partial epilepsy, intractable 2017     Past Medical History:   Diagnosis Date    Allergy     Anemia     Asthma     Depression     Diabetes mellitus, type 2     Diabetes with neurologic complications     GERD (gastroesophageal reflux disease)     High cholesterol     Hyperprolactinemia     Hypertension     Pseudotumor cerebri     Seizures     Sleep apnea     Type 2 diabetes mellitus                 PAST SURGICAL HISTORY:    Past Surgical History:   Procedure Laterality Date    BREAST CYST ASPIRATION       SECTION      X 1    CHOLECYSTECTOMY      SINUS SURGERY           FAMILY HISTORY:                Family History   Problem Relation Age of Onset    Cancer Mother      Hypertension Mother     Diabetes Mother     Stroke Father     Heart disease Father     COPD Father     Heart attack Father     Cancer Maternal Grandmother     Breast cancer Maternal Grandmother        SOCIAL HISTORY:          Tobacco:   Social History     Tobacco Use   Smoking Status Current Every Day Smoker    Packs/day: 1.00    Years: 15.00    Pack years: 15.00    Types: Cigarettes   Smokeless Tobacco Current User       alcohol use:    Social History     Substance and Sexual Activity   Alcohol Use No    Alcohol/week: 0.0 oz                   ALLERGIES:  Review of patient's allergies indicates:  No Known Allergies    CURRENT MEDICATIONS:    Current Outpatient Medications   Medication Sig Dispense Refill    acetaZOLAMIDE (DIAMOX) 500 mg CpSR Take 1 capsule (500 mg total) by mouth 2 (two) times daily. 360 capsule 1    albuterol 90 mcg/actuation inhaler Inhale 2 puffs into the lungs every 6 (six) hours as needed for Wheezing. Rescue 1 Inhaler 0    albuterol-ipratropium 2.5mg-0.5mg/3mL (DUO-NEB) 0.5 mg-3 mg(2.5 mg base)/3 mL nebulizer solution Take 3 mLs by nebulization every 6 (six) hours as needed for Wheezing or Shortness of Breath. Rescue 100 mL 0    blood sugar diagnostic Strp 1 each by Misc.(Non-Drug; Combo Route) route 4 (four) times daily before meals and nightly. ACCU CHEK ELVIA PLUS METER 200 each 3    blood-glucose meter (ACCU-CHEK ELVIA PLUS METER) Misc TEST FOUR TIMES DAILY BEFORE MEALS AND EVERY NIGHT 90 each 1    budesonide-formoterol 160-4.5 mcg (SYMBICORT) 160-4.5 mcg/actuation HFAA Inhale 2 puffs into the lungs every 12 (twelve) hours. Controller 1 Inhaler 5    buPROPion (WELLBUTRIN XL) 150 MG TB24 tablet Take 1 tablet (150 mg total) by mouth once daily. 30 tablet 0    capsaicin 0.1 % Crea Apply 1 application topically 2 (two) times daily. 56.6 g 1    cetirizine (ZYRTEC) 10 MG tablet Take 1 tablet (10 mg total) by mouth 2 (two) times daily. 60 tablet 3    hydroCHLOROthiazide  (HYDRODIURIL) 25 MG tablet Take 1 tablet (25 mg total) by mouth once daily. 30 tablet 11    hydrOXYzine (ATARAX) 50 MG tablet Take 1-4 at night for itching. 120 tablet 3    hydrOXYzine HCl (ATARAX) 25 MG tablet TK 1 TO 8 TS PO HS. START WITH 3 TS THEN INCREASE OR DECREASE AS NEEDED UNTIL ITCHING IS CONTROLLED OR MAX OF 8 TABLETS  3    lancets (ACCU-CHEK SOFTCLIX LANCETS) Misc 1 Device by Misc.(Non-Drug; Combo Route) route 4 (four) times daily. 200 each 3    lancets 25 gauge Misc 1 lancet by Misc.(Non-Drug; Combo Route) route 4 (four) times daily before meals and nightly. 200 each 11    LINZESS 290 mcg Cap TK ONE CAPSULE PO D ON AN EMPTY STOMACH  5    losartan (COZAAR) 100 MG tablet TAKE 1 TABLET BY MOUTH EVERY DAY 90 tablet 0    magnesium oxide (MAG-OX) 400 mg tablet Take 1 tablet (400 mg total) by mouth once daily. 90 tablet 3    meloxicam (MOBIC) 15 MG tablet TAKE 1 TABLET(15 MG) BY MOUTH DAILY AS NEEDED FOR HEADACHE 90 tablet 1    metformin (GLUCOPHAGE-XR) 500 MG 24 hr tablet Take 2 tablets (1,000 mg total) by mouth 2 (two) times daily with meals. 360 tablet 0    mometasone (NASONEX) 50 mcg/actuation nasal spray 2 sprays by Nasal route once daily. 1 each 11    mometasone 0.1% (ELOCON) 0.1 % cream       naproxen (NAPROSYN) 500 MG tablet Take 1 tablet (500 mg total) by mouth 3 (three) times daily as needed. 30 tablet 3    NOVOLOG FLEXPEN 100 unit/mL InPn pen ADM 15 UNITS SC TID B MEALS  11    oxyCODONE-acetaminophen (PERCOCET)  mg per tablet       pantoprazole (PROTONIX) 40 MG tablet Take 40 mg by mouth once daily.      sumatriptan (IMITREX) 50 MG tablet Take 1 tab at onset of headaches.  If no improvement in 2 hours, take another.  Do not take more than 2 tabs in 24 hours. 9 tablet 5    TAZTIA  mg CpSR TK ONE CAPSULE PO D  2    tiotropium (SPIRIVA) 18 mcg inhalation capsule Inhale 1 capsule (18 mcg total) into the lungs once daily. Controller 30 capsule 3    tiZANidine (ZANAFLEX) 4  MG tablet       topiramate (TOPAMAX) 100 MG tablet Take 3 tablets (300 mg total) by mouth 2 (two) times daily. 180 tablet 5    venlafaxine (EFFEXOR-XR) 75 MG 24 hr capsule Take 1 capsule (75 mg total) by mouth every evening. 90 capsule 1    quetiapine (SEROQUEL) 25 MG Tab Take 1 tablet (25 mg total) by mouth every evening. 30 tablet 0     Current Facility-Administered Medications   Medication Dose Route Frequency Provider Last Rate Last Dose    dexamethasone injection 2 mg  2 mg Other Once Laura Barnes DPM                          PHYSICAL EXAM:  There were no vitals taken for this visit.  GENERAL: Overweight body habitus, well groomed.  HEENT:   HEENT:  Conjunctivae are non-erythematous; Pupils equal, round, and reactive to light; Nose is symmetrical; Nasal mucosa is pink and moist; Septum is midline; Inferior turbinates are hypertrophied; Nasal airflow is diminshed; Posterior pharynx is pink; Modified Mallampati:III-IV; Posterior palate is elevated; Tonsils not visualized; Uvula is wide and elongated;Tongue is enlarged; Dentition is fair; No TMJ tenderness; Jaw opening and protrusion without click and without discomfort.  NECK: Supple. Neck circumference is 15 inches. No thyromegaly. No palpable nodes.     SKIN: On face and neck: No abrasions, no rashes, no lesions.  No subcutaneous nodules are palpable.  RESPIRATORY: Chest is clear to auscultation.  Normal chest expansion and non-labored breathing at rest.  CARDIOVASCULAR: Normal S1, S2.  No murmurs, gallops or rubs. No carotid bruits bilaterally.  No edema. No clubbing. No cyanosis.    NEURO: Oriented to time, place and person. Normal attention span and concentration. Gait normal.    PSYCH: Affect is full. Mood is normal  MUSCULOSKELETAL: Moves 4 extremities. Gait normal.         Using [facility]: pending      ASSESSMENT:    1. SHARATH (obstructive sleep apnea). The patient symptomatically has  excessive daytime sleepiness, snoring,  witnessed breathing  "pauses, excessive daytime fatigue, gasping for air in sleep, interrupted sleep and nocturia  with exam findings of "a crowded oral airway and elevated body mass index. The patient has medical co-morbidities of diabetes, migraine, seizure disorder which can be worsened by SHARATH. This warrants treatment.The patient's complaints include excessive daytime sleepiness, excessive daytime fatigue, snoring,  witnessed breathing pauses,  gasping for air in sleep and interrupted sleep.  She was fisrt diagnosed with SHARATH over 10 years ago; most recent study 2-3 years ago in [place] ([facility]). She could not tolerate CPAp during titration and did not follow up.  Still reports significant daytime sleepiness. At some point she saw Dr. Harrell - for asthma. PSG was requested from [facility], but I do not see it in [facility].  The patient is very reluctant to repeat a sleep study (found the experience traumatic), and would like us to keep trying to obtain the results from [facility].           PLAN:    We will repeat our request for sleep study report from [place] [facility] and contact the patient for the follow up.      More than 25 minutes of this 45 minutes visit was spent in counseling: during our discussion today, we talked about the etiology of  SHARATH (CPAP, OA for SHARATH, positional therapy, weight loss) as well as the potential ramifications of untreated sleep apnea, which could include daytime sleepiness, hypertension, heart disease and/or stroke.  We discussed potential treatment options, which could include weight loss, body positioning, continuous positive airway pressure (CPAP), or referral for surgical consideration. Meanwhile, she  is urged to avoid supine sleep, weight gain and alcoholic beverages since all of these can worsen SHARATH.     Precautions: The patient was advised to abstain from driving should he feel sleepy or drowsy.    Follow up: MD Elizalde)  in 2 months.     Thank you for allowing me " the opportunity to participate in the care of your patient.    This visit summary will be sent to referring provider via [employer]

## 2018-08-28 ENCOUNTER — HOSPITAL ENCOUNTER (OUTPATIENT)
Dept: RADIOLOGY | Facility: HOSPITAL | Age: 48
Discharge: HOME | End: 2018-08-28
Attending: PSYCHIATRY & NEUROLOGY
Payer: MEDICARE

## 2018-08-28 DIAGNOSIS — G89.29 CHRONIC BILATERAL LOW BACK PAIN WITHOUT SCIATICA: ICD-10-CM

## 2018-08-28 DIAGNOSIS — M54.50 CHRONIC BILATERAL LOW BACK PAIN WITHOUT SCIATICA: ICD-10-CM

## 2018-08-28 PROCEDURE — 72148 MRI LUMBAR SPINE W/O DYE: CPT | Mod: 26,,, | Performed by: RADIOLOGY

## 2018-08-28 PROCEDURE — 72148 MRI LUMBAR SPINE W/O DYE: CPT | Mod: TC

## 2018-09-03 ENCOUNTER — HOSPITAL ENCOUNTER (INPATIENT)
Facility: HOSPITAL | Age: 48
LOS: 3 days | Discharge: HOME | DRG: 189 | End: 2018-09-06
Attending: EMERGENCY MEDICINE | Admitting: HOSPITALIST
Payer: MEDICARE

## 2018-09-03 DIAGNOSIS — R09.02 HYPOXEMIA: ICD-10-CM

## 2018-09-03 DIAGNOSIS — R06.02 SHORTNESS OF BREATH: ICD-10-CM

## 2018-09-03 DIAGNOSIS — J45.901 ASTHMA WITH SEVERE EXACERBATION: Primary | ICD-10-CM

## 2018-09-03 DIAGNOSIS — R05.9 COUGH: ICD-10-CM

## 2018-09-03 DIAGNOSIS — R07.9 CHEST PAIN: ICD-10-CM

## 2018-09-03 DIAGNOSIS — R50.9 FEVER, UNSPECIFIED FEVER CAUSE: ICD-10-CM

## 2018-09-03 DIAGNOSIS — J45.41 MODERATE PERSISTENT ASTHMA WITH EXACERBATION: ICD-10-CM

## 2018-09-03 PROCEDURE — 25000242 PHARM REV CODE 250 ALT 637 W/ HCPCS: Performed by: PHYSICIAN ASSISTANT

## 2018-09-03 PROCEDURE — 99291 CRITICAL CARE FIRST HOUR: CPT | Mod: 25

## 2018-09-03 PROCEDURE — 80048 BASIC METABOLIC PNL TOTAL CA: CPT

## 2018-09-03 PROCEDURE — 12000002 HC ACUTE/MED SURGE SEMI-PRIVATE ROOM

## 2018-09-03 PROCEDURE — 94640 AIRWAY INHALATION TREATMENT: CPT

## 2018-09-03 PROCEDURE — 85025 COMPLETE CBC W/AUTO DIFF WBC: CPT

## 2018-09-03 RX ORDER — IPRATROPIUM BROMIDE AND ALBUTEROL SULFATE 2.5; .5 MG/3ML; MG/3ML
3 SOLUTION RESPIRATORY (INHALATION)
Status: DISCONTINUED | OUTPATIENT
Start: 2018-09-03 | End: 2018-09-04

## 2018-09-03 RX ORDER — IPRATROPIUM BROMIDE AND ALBUTEROL SULFATE 2.5; .5 MG/3ML; MG/3ML
3 SOLUTION RESPIRATORY (INHALATION)
Status: COMPLETED | OUTPATIENT
Start: 2018-09-03 | End: 2018-09-03

## 2018-09-03 RX ADMIN — IPRATROPIUM BROMIDE AND ALBUTEROL SULFATE 3 ML: .5; 2.5 SOLUTION RESPIRATORY (INHALATION) at 11:09

## 2018-09-04 PROBLEM — J45.901 ASTHMA WITH SEVERE EXACERBATION: Status: ACTIVE | Noted: 2018-09-04

## 2018-09-04 PROBLEM — R07.9 CHEST PAIN: Status: ACTIVE | Noted: 2018-09-04

## 2018-09-04 PROBLEM — F11.20 OPIOID DEPENDENCE: Status: ACTIVE | Noted: 2018-09-04

## 2018-09-04 PROBLEM — J96.01 ACUTE RESPIRATORY FAILURE WITH HYPOXIA: Status: ACTIVE | Noted: 2018-09-04

## 2018-09-04 PROBLEM — M48.061 LUMBAR STENOSIS: Status: ACTIVE | Noted: 2018-09-04

## 2018-09-04 LAB
ALLENS TEST: ABNORMAL
ANION GAP SERPL CALC-SCNC: 11 MMOL/L
ANION GAP SERPL CALC-SCNC: 9 MMOL/L
BASOPHILS # BLD AUTO: 0 K/UL
BASOPHILS # BLD AUTO: 0.01 K/UL
BASOPHILS NFR BLD: 0 %
BASOPHILS NFR BLD: 0.2 %
BUN SERPL-MCNC: 10 MG/DL
BUN SERPL-MCNC: 10 MG/DL
CALCIUM SERPL-MCNC: 8.6 MG/DL
CALCIUM SERPL-MCNC: 8.7 MG/DL
CHLORIDE SERPL-SCNC: 108 MMOL/L
CHLORIDE SERPL-SCNC: 109 MMOL/L
CO2 SERPL-SCNC: 19 MMOL/L
CO2 SERPL-SCNC: 20 MMOL/L
CREAT SERPL-MCNC: 1.1 MG/DL
CREAT SERPL-MCNC: 1.1 MG/DL
DELSYS: ABNORMAL
DIFFERENTIAL METHOD: ABNORMAL
DIFFERENTIAL METHOD: ABNORMAL
EOSINOPHIL # BLD AUTO: 0 K/UL
EOSINOPHIL # BLD AUTO: 0.3 K/UL
EOSINOPHIL NFR BLD: 0.2 %
EOSINOPHIL NFR BLD: 4.7 %
ERYTHROCYTE [DISTWIDTH] IN BLOOD BY AUTOMATED COUNT: 16 %
ERYTHROCYTE [DISTWIDTH] IN BLOOD BY AUTOMATED COUNT: 16.1 %
ERYTHROCYTE [SEDIMENTATION RATE] IN BLOOD BY WESTERGREN METHOD: 21 MM/H
EST. GFR  (AFRICAN AMERICAN): >60 ML/MIN/1.73 M^2
EST. GFR  (AFRICAN AMERICAN): >60 ML/MIN/1.73 M^2
EST. GFR  (NON AFRICAN AMERICAN): >60 ML/MIN/1.73 M^2
EST. GFR  (NON AFRICAN AMERICAN): >60 ML/MIN/1.73 M^2
FLOW: 4
FLUAV AG SPEC QL IA: NEGATIVE
FLUBV AG SPEC QL IA: NEGATIVE
GLUCOSE SERPL-MCNC: 117 MG/DL
GLUCOSE SERPL-MCNC: 212 MG/DL
HCO3 UR-SCNC: 21.6 MMOL/L (ref 24–28)
HCT VFR BLD AUTO: 36.5 %
HCT VFR BLD AUTO: 36.8 %
HGB BLD-MCNC: 11.7 G/DL
HGB BLD-MCNC: 11.8 G/DL
LYMPHOCYTES # BLD AUTO: 0.5 K/UL
LYMPHOCYTES # BLD AUTO: 0.9 K/UL
LYMPHOCYTES NFR BLD: 15.8 %
LYMPHOCYTES NFR BLD: 7.1 %
MCH RBC QN AUTO: 27.1 PG
MCH RBC QN AUTO: 27.1 PG
MCHC RBC AUTO-ENTMCNC: 32.1 G/DL
MCHC RBC AUTO-ENTMCNC: 32.1 G/DL
MCV RBC AUTO: 84 FL
MCV RBC AUTO: 85 FL
MODE: ABNORMAL
MONOCYTES # BLD AUTO: 0 K/UL
MONOCYTES # BLD AUTO: 0.5 K/UL
MONOCYTES NFR BLD: 0.5 %
MONOCYTES NFR BLD: 9.3 %
NEUTROPHILS # BLD AUTO: 4 K/UL
NEUTROPHILS # BLD AUTO: 5.8 K/UL
NEUTROPHILS NFR BLD: 69.8 %
NEUTROPHILS NFR BLD: 92.2 %
PCO2 BLDA: 42.6 MMHG (ref 35–45)
PH SMN: 7.31 [PH] (ref 7.35–7.45)
PLATELET # BLD AUTO: 289 K/UL
PLATELET # BLD AUTO: 347 K/UL
PMV BLD AUTO: 8.9 FL
PMV BLD AUTO: 9.6 FL
PO2 BLDA: 76 MMHG (ref 80–100)
POC BE: -4 MMOL/L
POC SATURATED O2: 94 % (ref 95–100)
POC TCO2: 23 MMOL/L (ref 23–27)
POCT GLUCOSE: 169 MG/DL (ref 70–110)
POCT GLUCOSE: 187 MG/DL (ref 70–110)
POCT GLUCOSE: 194 MG/DL (ref 70–110)
POTASSIUM SERPL-SCNC: 4.1 MMOL/L
POTASSIUM SERPL-SCNC: 4.1 MMOL/L
RBC # BLD AUTO: 4.31 M/UL
RBC # BLD AUTO: 4.36 M/UL
SAMPLE: ABNORMAL
SITE: ABNORMAL
SODIUM SERPL-SCNC: 137 MMOL/L
SODIUM SERPL-SCNC: 139 MMOL/L
SP02: 91
SPECIMEN SOURCE: NORMAL
TROPONIN I SERPL DL<=0.01 NG/ML-MCNC: <0.006 NG/ML
WBC # BLD AUTO: 5.69 K/UL
WBC # BLD AUTO: 6.32 K/UL

## 2018-09-04 PROCEDURE — 36600 WITHDRAWAL OF ARTERIAL BLOOD: CPT

## 2018-09-04 PROCEDURE — 94644 CONT INHLJ TX 1ST HOUR: CPT

## 2018-09-04 PROCEDURE — 20000000 HC ICU ROOM

## 2018-09-04 PROCEDURE — 96365 THER/PROPH/DIAG IV INF INIT: CPT

## 2018-09-04 PROCEDURE — 25000003 PHARM REV CODE 250: Performed by: EMERGENCY MEDICINE

## 2018-09-04 PROCEDURE — 82803 BLOOD GASES ANY COMBINATION: CPT

## 2018-09-04 PROCEDURE — 63600175 PHARM REV CODE 636 W HCPCS: Performed by: INTERNAL MEDICINE

## 2018-09-04 PROCEDURE — 85025 COMPLETE CBC W/AUTO DIFF WBC: CPT

## 2018-09-04 PROCEDURE — 93010 ELECTROCARDIOGRAM REPORT: CPT | Mod: ,,, | Performed by: INTERNAL MEDICINE

## 2018-09-04 PROCEDURE — S4991 NICOTINE PATCH NONLEGEND: HCPCS | Performed by: INTERNAL MEDICINE

## 2018-09-04 PROCEDURE — 80048 BASIC METABOLIC PNL TOTAL CA: CPT

## 2018-09-04 PROCEDURE — 63600175 PHARM REV CODE 636 W HCPCS: Performed by: EMERGENCY MEDICINE

## 2018-09-04 PROCEDURE — 96372 THER/PROPH/DIAG INJ SC/IM: CPT | Mod: 59

## 2018-09-04 PROCEDURE — 99900035 HC TECH TIME PER 15 MIN (STAT)

## 2018-09-04 PROCEDURE — 96375 TX/PRO/DX INJ NEW DRUG ADDON: CPT

## 2018-09-04 PROCEDURE — 94640 AIRWAY INHALATION TREATMENT: CPT

## 2018-09-04 PROCEDURE — 25000003 PHARM REV CODE 250: Performed by: INTERNAL MEDICINE

## 2018-09-04 PROCEDURE — 27000221 HC OXYGEN, UP TO 24 HOURS

## 2018-09-04 PROCEDURE — 25000242 PHARM REV CODE 250 ALT 637 W/ HCPCS: Performed by: EMERGENCY MEDICINE

## 2018-09-04 PROCEDURE — 93005 ELECTROCARDIOGRAM TRACING: CPT

## 2018-09-04 PROCEDURE — 84484 ASSAY OF TROPONIN QUANT: CPT

## 2018-09-04 PROCEDURE — 96376 TX/PRO/DX INJ SAME DRUG ADON: CPT

## 2018-09-04 PROCEDURE — 87400 INFLUENZA A/B EACH AG IA: CPT

## 2018-09-04 PROCEDURE — 96366 THER/PROPH/DIAG IV INF ADDON: CPT

## 2018-09-04 RX ORDER — TIZANIDINE 2 MG/1
2 TABLET ORAL EVERY 8 HOURS PRN
Status: DISCONTINUED | OUTPATIENT
Start: 2018-09-04 | End: 2018-09-06 | Stop reason: HOSPADM

## 2018-09-04 RX ORDER — ENOXAPARIN SODIUM 100 MG/ML
30 INJECTION SUBCUTANEOUS EVERY 24 HOURS
Status: DISCONTINUED | OUTPATIENT
Start: 2018-09-04 | End: 2018-09-04 | Stop reason: DRUGHIGH

## 2018-09-04 RX ORDER — IPRATROPIUM BROMIDE AND ALBUTEROL SULFATE 2.5; .5 MG/3ML; MG/3ML
3 SOLUTION RESPIRATORY (INHALATION) EVERY 4 HOURS
Status: DISCONTINUED | OUTPATIENT
Start: 2018-09-04 | End: 2018-09-05

## 2018-09-04 RX ORDER — ENOXAPARIN SODIUM 100 MG/ML
40 INJECTION SUBCUTANEOUS EVERY 12 HOURS
Status: DISCONTINUED | OUTPATIENT
Start: 2018-09-04 | End: 2018-09-06 | Stop reason: HOSPADM

## 2018-09-04 RX ORDER — SODIUM CHLORIDE 9 MG/ML
INJECTION, SOLUTION INTRAVENOUS CONTINUOUS
Status: DISCONTINUED | OUTPATIENT
Start: 2018-09-04 | End: 2018-09-04

## 2018-09-04 RX ORDER — ACETAZOLAMIDE 500 MG/1
500 CAPSULE, EXTENDED RELEASE ORAL 2 TIMES DAILY
Status: DISCONTINUED | OUTPATIENT
Start: 2018-09-04 | End: 2018-09-06 | Stop reason: HOSPADM

## 2018-09-04 RX ORDER — LOSARTAN POTASSIUM 25 MG/1
50 TABLET ORAL DAILY
Status: DISCONTINUED | OUTPATIENT
Start: 2018-09-04 | End: 2018-09-06 | Stop reason: HOSPADM

## 2018-09-04 RX ORDER — IBUPROFEN 200 MG
24 TABLET ORAL
Status: DISCONTINUED | OUTPATIENT
Start: 2018-09-04 | End: 2018-09-06 | Stop reason: HOSPADM

## 2018-09-04 RX ORDER — HYDROCHLOROTHIAZIDE 25 MG/1
25 TABLET ORAL DAILY
Status: DISCONTINUED | OUTPATIENT
Start: 2018-09-04 | End: 2018-09-06 | Stop reason: HOSPADM

## 2018-09-04 RX ORDER — METHYLPREDNISOLONE SOD SUCC 125 MG
125 VIAL (EA) INJECTION
Status: COMPLETED | OUTPATIENT
Start: 2018-09-04 | End: 2018-09-04

## 2018-09-04 RX ORDER — INSULIN ASPART 100 [IU]/ML
0-5 INJECTION, SOLUTION INTRAVENOUS; SUBCUTANEOUS
Status: DISCONTINUED | OUTPATIENT
Start: 2018-09-04 | End: 2018-09-06 | Stop reason: HOSPADM

## 2018-09-04 RX ORDER — ALBUTEROL SULFATE 2.5 MG/.5ML
2.5 SOLUTION RESPIRATORY (INHALATION)
Status: DISCONTINUED | OUTPATIENT
Start: 2018-09-04 | End: 2018-09-05

## 2018-09-04 RX ORDER — VENLAFAXINE HYDROCHLORIDE 75 MG/1
75 CAPSULE, EXTENDED RELEASE ORAL NIGHTLY
Status: DISCONTINUED | OUTPATIENT
Start: 2018-09-04 | End: 2018-09-06 | Stop reason: HOSPADM

## 2018-09-04 RX ORDER — GLUCAGON 1 MG
1 KIT INJECTION
Status: DISCONTINUED | OUTPATIENT
Start: 2018-09-04 | End: 2018-09-06 | Stop reason: HOSPADM

## 2018-09-04 RX ORDER — ALBUTEROL SULFATE 2.5 MG/.5ML
2.5 SOLUTION RESPIRATORY (INHALATION)
Status: COMPLETED | OUTPATIENT
Start: 2018-09-04 | End: 2018-09-04

## 2018-09-04 RX ORDER — ONDANSETRON 2 MG/ML
4 INJECTION INTRAMUSCULAR; INTRAVENOUS EVERY 8 HOURS PRN
Status: DISCONTINUED | OUTPATIENT
Start: 2018-09-04 | End: 2018-09-06 | Stop reason: HOSPADM

## 2018-09-04 RX ORDER — ACETAMINOPHEN 500 MG
1000 TABLET ORAL
Status: COMPLETED | OUTPATIENT
Start: 2018-09-04 | End: 2018-09-04

## 2018-09-04 RX ORDER — TOPIRAMATE 100 MG/1
300 TABLET, FILM COATED ORAL 2 TIMES DAILY
Status: DISCONTINUED | OUTPATIENT
Start: 2018-09-04 | End: 2018-09-06 | Stop reason: HOSPADM

## 2018-09-04 RX ORDER — QUETIAPINE FUMARATE 25 MG/1
25 TABLET, FILM COATED ORAL NIGHTLY
Status: DISCONTINUED | OUTPATIENT
Start: 2018-09-04 | End: 2018-09-06 | Stop reason: HOSPADM

## 2018-09-04 RX ORDER — CETIRIZINE HYDROCHLORIDE 10 MG/1
10 TABLET ORAL 2 TIMES DAILY PRN
Status: DISCONTINUED | OUTPATIENT
Start: 2018-09-04 | End: 2018-09-06 | Stop reason: HOSPADM

## 2018-09-04 RX ORDER — IBUPROFEN 400 MG/1
400 TABLET ORAL EVERY 6 HOURS PRN
Status: DISCONTINUED | OUTPATIENT
Start: 2018-09-04 | End: 2018-09-06 | Stop reason: HOSPADM

## 2018-09-04 RX ORDER — MAGNESIUM SULFATE HEPTAHYDRATE 40 MG/ML
2 INJECTION, SOLUTION INTRAVENOUS
Status: COMPLETED | OUTPATIENT
Start: 2018-09-04 | End: 2018-09-04

## 2018-09-04 RX ORDER — EPINEPHRINE 1 MG/ML
0.3 INJECTION, SOLUTION INTRACARDIAC; INTRAMUSCULAR; INTRAVENOUS; SUBCUTANEOUS
Status: COMPLETED | OUTPATIENT
Start: 2018-09-04 | End: 2018-09-04

## 2018-09-04 RX ORDER — HYDROXYZINE HYDROCHLORIDE 10 MG/1
10 TABLET, FILM COATED ORAL 3 TIMES DAILY PRN
Status: DISCONTINUED | OUTPATIENT
Start: 2018-09-04 | End: 2018-09-06 | Stop reason: HOSPADM

## 2018-09-04 RX ORDER — IBUPROFEN 200 MG
16 TABLET ORAL
Status: DISCONTINUED | OUTPATIENT
Start: 2018-09-04 | End: 2018-09-06 | Stop reason: HOSPADM

## 2018-09-04 RX ORDER — FAMOTIDINE 20 MG/1
20 TABLET, FILM COATED ORAL 2 TIMES DAILY
Status: DISCONTINUED | OUTPATIENT
Start: 2018-09-04 | End: 2018-09-06 | Stop reason: HOSPADM

## 2018-09-04 RX ORDER — OXYCODONE AND ACETAMINOPHEN 7.5; 325 MG/1; MG/1
1 TABLET ORAL EVERY 6 HOURS PRN
Status: DISCONTINUED | OUTPATIENT
Start: 2018-09-04 | End: 2018-09-06 | Stop reason: HOSPADM

## 2018-09-04 RX ORDER — IBUPROFEN 200 MG
1 TABLET ORAL DAILY
Status: DISCONTINUED | OUTPATIENT
Start: 2018-09-04 | End: 2018-09-06 | Stop reason: HOSPADM

## 2018-09-04 RX ORDER — ACETAMINOPHEN 325 MG/1
650 TABLET ORAL EVERY 8 HOURS PRN
Status: DISCONTINUED | OUTPATIENT
Start: 2018-09-04 | End: 2018-09-06 | Stop reason: HOSPADM

## 2018-09-04 RX ORDER — ALBUTEROL SULFATE 2.5 MG/.5ML
10 SOLUTION RESPIRATORY (INHALATION) CONTINUOUS
Status: DISCONTINUED | OUTPATIENT
Start: 2018-09-04 | End: 2018-09-04

## 2018-09-04 RX ORDER — INSULIN ASPART 100 [IU]/ML
5 INJECTION, SOLUTION INTRAVENOUS; SUBCUTANEOUS
Status: DISCONTINUED | OUTPATIENT
Start: 2018-09-04 | End: 2018-09-05

## 2018-09-04 RX ADMIN — TOPIRAMATE 300 MG: 100 TABLET, FILM COATED ORAL at 09:09

## 2018-09-04 RX ADMIN — VENLAFAXINE HYDROCHLORIDE 75 MG: 75 CAPSULE, EXTENDED RELEASE ORAL at 09:09

## 2018-09-04 RX ADMIN — ENOXAPARIN SODIUM 40 MG: 100 INJECTION SUBCUTANEOUS at 09:09

## 2018-09-04 RX ADMIN — ALBUTEROL SULFATE 2.5 MG: 2.5 SOLUTION RESPIRATORY (INHALATION) at 12:09

## 2018-09-04 RX ADMIN — TIZANIDINE 2 MG: 2 TABLET ORAL at 09:09

## 2018-09-04 RX ADMIN — INSULIN DETEMIR 10 UNITS: 100 INJECTION, SOLUTION SUBCUTANEOUS at 09:09

## 2018-09-04 RX ADMIN — FAMOTIDINE 20 MG: 20 TABLET ORAL at 09:09

## 2018-09-04 RX ADMIN — IPRATROPIUM BROMIDE AND ALBUTEROL SULFATE 3 ML: .5; 2.5 SOLUTION RESPIRATORY (INHALATION) at 03:09

## 2018-09-04 RX ADMIN — METHYLPREDNISOLONE SODIUM SUCCINATE 125 MG: 125 INJECTION, POWDER, FOR SOLUTION INTRAMUSCULAR; INTRAVENOUS at 12:09

## 2018-09-04 RX ADMIN — NICOTINE 1 PATCH: 21 PATCH, EXTENDED RELEASE TRANSDERMAL at 01:09

## 2018-09-04 RX ADMIN — METHYLPREDNISOLONE SODIUM SUCCINATE 40 MG: 40 INJECTION, POWDER, FOR SOLUTION INTRAMUSCULAR; INTRAVENOUS at 08:09

## 2018-09-04 RX ADMIN — IPRATROPIUM BROMIDE AND ALBUTEROL SULFATE 3 ML: .5; 2.5 SOLUTION RESPIRATORY (INHALATION) at 07:09

## 2018-09-04 RX ADMIN — LOSARTAN POTASSIUM 50 MG: 25 TABLET, FILM COATED ORAL at 01:09

## 2018-09-04 RX ADMIN — IBUPROFEN 400 MG: 400 TABLET ORAL at 08:09

## 2018-09-04 RX ADMIN — ALBUTEROL SULFATE 10 MG: 2.5 SOLUTION RESPIRATORY (INHALATION) at 02:09

## 2018-09-04 RX ADMIN — ACETAZOLAMIDE 500 MG: 500 CAPSULE, EXTENDED RELEASE ORAL at 09:09

## 2018-09-04 RX ADMIN — METHYLPREDNISOLONE SODIUM SUCCINATE 40 MG: 40 INJECTION, POWDER, FOR SOLUTION INTRAMUSCULAR; INTRAVENOUS at 09:09

## 2018-09-04 RX ADMIN — IPRATROPIUM BROMIDE AND ALBUTEROL SULFATE 3 ML: .5; 2.5 SOLUTION RESPIRATORY (INHALATION) at 11:09

## 2018-09-04 RX ADMIN — INSULIN ASPART 5 UNITS: 100 INJECTION, SOLUTION INTRAVENOUS; SUBCUTANEOUS at 04:09

## 2018-09-04 RX ADMIN — EPINEPHRINE 0.3 MG: 1 INJECTION, SOLUTION, CONCENTRATE INTRAVENOUS at 01:09

## 2018-09-04 RX ADMIN — ACETAMINOPHEN 1000 MG: 500 TABLET, FILM COATED ORAL at 02:09

## 2018-09-04 RX ADMIN — QUETIAPINE FUMARATE 25 MG: 25 TABLET ORAL at 09:09

## 2018-09-04 RX ADMIN — ACETAMINOPHEN 650 MG: 325 TABLET, FILM COATED ORAL at 03:09

## 2018-09-04 RX ADMIN — MAGNESIUM SULFATE IN WATER 2 G: 40 INJECTION, SOLUTION INTRAVENOUS at 01:09

## 2018-09-04 RX ADMIN — HYDROCHLOROTHIAZIDE 25 MG: 25 TABLET ORAL at 01:09

## 2018-09-04 RX ADMIN — ACETAZOLAMIDE 500 MG: 500 CAPSULE, EXTENDED RELEASE ORAL at 01:09

## 2018-09-04 RX ADMIN — INSULIN ASPART 5 UNITS: 100 INJECTION, SOLUTION INTRAVENOUS; SUBCUTANEOUS at 02:09

## 2018-09-04 RX ADMIN — TOPIRAMATE 300 MG: 100 TABLET, FILM COATED ORAL at 01:09

## 2018-09-04 RX ADMIN — SODIUM CHLORIDE: 0.9 INJECTION, SOLUTION INTRAVENOUS at 04:09

## 2018-09-04 RX ADMIN — METHYLPREDNISOLONE SODIUM SUCCINATE 40 MG: 40 INJECTION, POWDER, FOR SOLUTION INTRAMUSCULAR; INTRAVENOUS at 01:09

## 2018-09-04 RX ADMIN — ONDANSETRON 4 MG: 2 INJECTION INTRAMUSCULAR; INTRAVENOUS at 09:09

## 2018-09-04 NOTE — NURSING
Patient arrived from the ED via stretcher by JOSH Bales. Patient had cardiac monitor on in use while being transported to the ICU. Patient was able to transport from the stretcher to the bed, SOB and use of abdominal muscles noted. Patient assessed upon arrival, vital signs stable, afebrile, expiratory wheezing, on 5L of O2, frequent coughing, dry.

## 2018-09-04 NOTE — ASSESSMENT & PLAN NOTE
Still smoking  Will offer nicotine patch  Will discuss cessation once able to participate more with our discussion

## 2018-09-04 NOTE — H&P
[facility] [place] [facility]  History & Physical    Patient Name: Yanni Kaiser  MRN: [id number]  Admission Date: 9/3/2018  Attending Physician: Deepa Wilson MD   Primary Care Provider: Carlyle Gordillo MD         Patient information was obtained from patient, past medical records and ER records.     Subjective:     Principal Problem:Acute respiratory failure with hypoxia    Chief Complaint:   Chief Complaint   Patient presents with    Asthma     O2 sats 89% at triage, unable to speak at triage, RR 36        HPI: 49 yo female with SHARATH (not on CPAP), morbid obesity, tobacco use disorder, asthma, diabetes mellitus type 2, hypertension, narcotic dependence, chronic back pain (Lumbar spine stenosis), depression, migraine headaches, pseudotumor cerebri who presented with worsening shortness of breath of one day in evolution. Patient stated she was recently exposed to children who had cough and sore throat. She stated she developed sore throat and cough, which eventually progressed to wheezing and shortness of breath despite use of bronchodilators. Also reported left sided chest pain. No Hx of MI or respiratory failure requiring intubation. Still smokes. Not on CPAP at home due to intolerance to titration and did not follow up (at [facility]). Is chronically fatigued and sleepy during daytime. Is on percocet and muscle relaxants for chronic back pain. Denied rhinorrhea, rash, confusion, LOC, hemoptysis, nausea, vomiting, abdominal pain, diarrhea.     In the ED, patient was noted to be febrile to 101.1F, in distress, hypertensive and hypoxic to 89%. ABG with metabolic acidosis and mild acidemia with no respiratory compensation. Evidently wheezing. Placed on a mask (not sure if simple or non rebreather, but per chart review is definitely not BiPAP or CPAP), given solumedrol, continuous nebs, epinephrine 0.3 mg IM and Mag 2 mg IV. No EKG or troponin ordered in the ED. Patient showed some  improvement after this. Patient was admitted to ICU for close observation.     Past Medical History:   Diagnosis Date    Allergy     Anemia     Asthma     Depression     Diabetes mellitus, type 2     Diabetes with neurologic complications     GERD (gastroesophageal reflux disease)     High cholesterol     Hyperprolactinemia     Hypertension     Pseudotumor cerebri     Seizures     Sleep apnea     Type 2 diabetes mellitus        Past Surgical History:   Procedure Laterality Date    BREAST CYST ASPIRATION       SECTION      X 1    CHOLECYSTECTOMY      SINUS SURGERY         Review of patient's allergies indicates:  No Known Allergies    No current facility-administered medications on file prior to encounter.      Current Outpatient Medications on File Prior to Encounter   Medication Sig    albuterol 90 mcg/actuation inhaler Inhale 2 puffs into the lungs every 6 (six) hours as needed for Wheezing. Rescue    albuterol-ipratropium 2.5mg-0.5mg/3mL (DUO-NEB) 0.5 mg-3 mg(2.5 mg base)/3 mL nebulizer solution Take 3 mLs by nebulization every 6 (six) hours as needed for Wheezing or Shortness of Breath. Rescue    acetaZOLAMIDE (DIAMOX) 500 mg CpSR Take 1 capsule (500 mg total) by mouth 2 (two) times daily.    blood sugar diagnostic Strp 1 each by Misc.(Non-Drug; Combo Route) route 4 (four) times daily before meals and nightly. ACCU CHEK ELVIA PLUS METER    blood-glucose meter (ACCU-CHEK ELVIA PLUS METER) Misc TEST FOUR TIMES DAILY BEFORE MEALS AND EVERY NIGHT    budesonide-formoterol 160-4.5 mcg (SYMBICORT) 160-4.5 mcg/actuation HFAA Inhale 2 puffs into the lungs every 12 (twelve) hours. Controller    buPROPion (WELLBUTRIN XL) 150 MG TB24 tablet Take 1 tablet (150 mg total) by mouth once daily.    capsaicin 0.1 % Crea Apply 1 application topically 2 (two) times daily.    cetirizine (ZYRTEC) 10 MG tablet Take 1 tablet (10 mg total) by mouth 2 (two) times daily.    hydroCHLOROthiazide  (HYDRODIURIL) 25 MG tablet Take 1 tablet (25 mg total) by mouth once daily.    hydrOXYzine (ATARAX) 50 MG tablet Take 1-4 at night for itching.    hydrOXYzine HCl (ATARAX) 25 MG tablet TK 1 TO 8 TS PO HS. START WITH 3 TS THEN INCREASE OR DECREASE AS NEEDED UNTIL ITCHING IS CONTROLLED OR MAX OF 8 TABLETS    lancets (ACCU-CHEK SOFTCLIX LANCETS) Misc 1 Device by Misc.(Non-Drug; Combo Route) route 4 (four) times daily.    lancets 25 gauge Misc 1 lancet by Misc.(Non-Drug; Combo Route) route 4 (four) times daily before meals and nightly.    LINZESS 290 mcg Cap TK ONE CAPSULE PO D ON AN EMPTY STOMACH    losartan (COZAAR) 100 MG tablet TAKE 1 TABLET BY MOUTH EVERY DAY    magnesium oxide (MAG-OX) 400 mg tablet Take 1 tablet (400 mg total) by mouth once daily.    meloxicam (MOBIC) 15 MG tablet TAKE 1 TABLET(15 MG) BY MOUTH DAILY AS NEEDED FOR HEADACHE    metformin (GLUCOPHAGE-XR) 500 MG 24 hr tablet Take 2 tablets (1,000 mg total) by mouth 2 (two) times daily with meals.    mometasone (NASONEX) 50 mcg/actuation nasal spray 2 sprays by Nasal route once daily.    mometasone 0.1% (ELOCON) 0.1 % cream     naproxen (NAPROSYN) 500 MG tablet Take 1 tablet (500 mg total) by mouth 3 (three) times daily as needed.    NOVOLOG FLEXPEN 100 unit/mL InPn pen ADM 15 UNITS SC TID B MEALS    oxyCODONE-acetaminophen (PERCOCET)  mg per tablet     pantoprazole (PROTONIX) 40 MG tablet Take 40 mg by mouth once daily.    quetiapine (SEROQUEL) 25 MG Tab Take 1 tablet (25 mg total) by mouth every evening.    sumatriptan (IMITREX) 50 MG tablet Take 1 tab at onset of headaches.  If no improvement in 2 hours, take another.  Do not take more than 2 tabs in 24 hours.    TAZTIA  mg CpSR TK ONE CAPSULE PO D    tiotropium (SPIRIVA) 18 mcg inhalation capsule Inhale 1 capsule (18 mcg total) into the lungs once daily. Controller    tiZANidine (ZANAFLEX) 4 MG tablet     topiramate (TOPAMAX) 100 MG tablet Take 3 tablets (300 mg  total) by mouth 2 (two) times daily.    venlafaxine (EFFEXOR-XR) 75 MG 24 hr capsule Take 1 capsule (75 mg total) by mouth every evening.     Family History     Problem Relation (Age of Onset)    Breast cancer Maternal Grandmother    COPD Father    Cancer Mother, Maternal Grandmother    Diabetes Mother    Heart attack Father    Heart disease Father    Hypertension Mother    Stroke Father        Tobacco Use    Smoking status: Current Every Day Smoker     Packs/day: 1.00     Years: 15.00     Pack years: 15.00     Types: Cigarettes    Smokeless tobacco: Current User   Substance and Sexual Activity    Alcohol use: No     Alcohol/week: 0.0 oz    Drug use: No    Sexual activity: Yes     Partners: Male     Birth control/protection: None     Review of Systems   Constitutional: Positive for fever.   HENT: Positive for sore throat.    Respiratory: Positive for cough, shortness of breath and wheezing.    Cardiovascular: Positive for chest pain. Negative for palpitations and leg swelling.   Gastrointestinal: Negative.    Genitourinary: Negative.    Musculoskeletal: Negative.    Neurological: Negative.    Hematological: Negative.    Psychiatric/Behavioral: Negative.      Objective:     Vital Signs (Most Recent):  Temp: 98.3 °F (36.8 °C) (09/04/18 0711)  Pulse: 90 (09/04/18 0845)  Resp: (!) 23 (09/04/18 0745)  BP: (!) 144/92 (09/04/18 0845)  SpO2: 96 % (09/04/18 0845) Vital Signs (24h Range):  Temp:  [98.3 °F (36.8 °C)-101.1 °F (38.4 °C)] 98.3 °F (36.8 °C)  Pulse:  [] 90  Resp:  [18-36] 23  SpO2:  [87 %-96 %] 96 %  BP: (139-180)/() 144/92     Weight: (!) 172.8 kg (380 lb 15.3 oz)  Body mass index is 65.39 kg/m².    Physical Exam   Constitutional: She is oriented to person, place, and time. She appears well-developed. No distress.   Morbid obesity  Non toxic appearing   Eyes: Conjunctivae and EOM are normal.   Neck: Normal range of motion.   Cardiovascular: Normal rate and regular rhythm.   Pulmonary/Chest: No  stridor. No respiratory distress. She has wheezes. She has no rales.   Speaking mid sentences with use of accessory muscles   On low flow NC   Abdominal: Soft. Bowel sounds are normal.   Musculoskeletal: Normal range of motion. She exhibits no edema.   Neurological: She is alert and oriented to person, place, and time.   Skin: Skin is warm and dry. Capillary refill takes less than 2 seconds. She is not diaphoretic.   Psychiatric: She has a normal mood and affect. Her behavior is normal. Judgment and thought content normal.   Nursing note and vitals reviewed.        CRANIAL NERVES     CN III, IV, VI   Extraocular motions are normal.        Significant Labs: All pertinent labs within the past 24 hours have been reviewed.    Significant Imaging: I have reviewed all pertinent imaging results/findings within the past 24 hours.  I have reviewed and interpreted all pertinent imaging results/findings within the past 24 hours.    Assessment/Plan:     * Acute respiratory failure with hypoxia    Secondary to severe asthma exacerbation secondary to viral URI  Hypoxia improved with bronchodilators, supplemental O2, Magnesium IV and solumedrol, but still wheezing plenty  Will continue with solumedrol 40 mg TID, duo-nebs every 4 hours and supplemental O2  Will add BiPAP for night use but I am concerned patient will refuse this given her experience with CPAP   Continuous O2 sats  Patient is chronically on muscles relaxants, seroquel and narcotics. Use judiciously to avoid hypoventilation  Needs to lose weight (BMI > 60)  Observe in ICU overnight.               Asthma with severe exacerbation    As above  Plan to resume home symbicort and spiriva once better from wheezing and SOB          Chest pain    May be secondary to reactive airways disease but given her underlying DM2 and cardiovascular disease, MI needs to be ruled out. Will obtain EKG and troponin now.           Tobacco use disorder, moderate, dependence    Still  "smoking  Will offer nicotine patch  Will discuss cessation once able to participate more with our discussion          SHARATH (obstructive sleep apnea)    Not compliant with CPAP due to "traumatic experience" with CPAP titration as outpatient  This was done at [facility]  Will try BiPAP tonight.          Lumbar stenosis    No acute issues  Bed rest for now  Home Percocet and muscle relaxant judiciously           Opioid dependence    As above          Type 2 diabetes mellitus with stage 3 chronic kidney disease, with long-term current use of insulin    Renal function stable  Hold metformin while inpatient  Start insulin and adjust as needed for goal BG < 180  Is on solumedrol for severe asthma exacerbation          GERD (gastroesophageal reflux disease)    No acute issues  Agree with famotidine          Depression    No acute issues  Resume effexor and seroquel (patient not taking wellbutrin)            Combined hyperlipidemia associated with type 2 diabetes mellitus    Resume statin          Essential hypertension    Initially hypertensive but now better  Will resume home losartan and HCTZ          Idiopathic intracranial hypertension    No acute issues  Resume home diamox          Morbid obesity with BMI of 60.0-69.9, adult    Evidently causing a negative impact in patient's health  Needs to address weight loss options with PCP          Migraine    No acute issues  Resume home topamx            VTE Risk Mitigation (From admission, onward)        Ordered     enoxaparin injection 40 mg  Every 12 hours      09/04/18 0514     IP VTE HIGH RISK PATIENT  Once      09/04/18 0356        Critical care time spent on the evaluation and treatment of severe organ dysfunction, review of pertinent labs and imaging studies, discussions with consulting providers and discussions with patient/family: > 35 minutes.     Deepa Hinojosa MD  Department of Hospital Medicine   [facility] [place]  "

## 2018-09-04 NOTE — ASSESSMENT & PLAN NOTE
Secondary to severe asthma exacerbation secondary to viral URI  Hypoxia improved with bronchodilators, supplemental O2, Magnesium IV and solumedrol, but still wheezing plenty  Will continue with solumedrol 40 mg TID, duo-nebs every 4 hours and supplemental O2  Will add BiPAP for night use but I am concerned patient will refuse this given her experience with CPAP   Continuous O2 sats  Patient is chronically on muscles relaxants, seroquel and narcotics. Use judiciously to avoid hypoventilation  Needs to lose weight (BMI > 60)  Observe in ICU overnight.

## 2018-09-04 NOTE — HPI
47 yo female with SHARATH (not on CPAP), morbid obesity, tobacco use disorder, asthma, diabetes mellitus type 2, hypertension, narcotic dependence, chronic back pain (Lumbar spine stenosis), depression, migraine headaches, pseudotumor cerebri who presented with worsening shortness of breath of one day in evolution. Patient stated she was recently exposed to children who had cough and sore throat. She stated she developed sore throat and cough, which eventually progressed to wheezing and shortness of breath despite use of bronchodilators. Also reported left sided chest pain. No Hx of MI or respiratory failure requiring intubation. Still smokes. Not on CPAP at home due to intolerance to titration and did not follow up (at [facility]). Is chronically fatigued and sleepy during daytime. Is on percocet and muscle relaxants for chronic back pain. Denied rhinorrhea, rash, confusion, LOC, hemoptysis, nausea, vomiting, abdominal pain, diarrhea.     In the ED, patient was noted to be febrile to 101.1F, in distress, hypertensive and hypoxic to 89%. ABG with metabolic acidosis and mild acidemia with no respiratory compensation. Evidently wheezing. Placed on a mask (not sure if simple or non rebreather, but per chart review is definitely not BiPAP or CPAP), given solumedrol, continuous nebs, epinephrine 0.3 mg IM and Mag 2 mg IV. No EKG or troponin ordered in the ED. Patient showed some improvement after this. Patient was admitted to ICU for close observation.

## 2018-09-04 NOTE — ASSESSMENT & PLAN NOTE
Renal function stable  Hold metformin while inpatient  Start insulin and adjust as needed for goal BG < 180  Is on solumedrol for severe asthma exacerbation

## 2018-09-04 NOTE — ED PROVIDER NOTES
Encounter Date: 9/3/2018    SCRIBE #1 NOTE: I, Glenndieudonne Corbin II, am scribing for, and in the presence of,  Jerome Sánchez MD. I have scribed the following portions of the note - Other sections scribed: HPI, ROS, PE.       History     Chief Complaint   Patient presents with    Asthma     O2 sats 89% at triage, unable to speak at triage, RR 36     CC: Asthma     HPI: This 48 y.o. female presents to the ED c/o acute onset, asthma with cough, SOB, wheezing, headache, nausea, sore throat, congestion, and fever x1 day. Pt reports her flu like symptoms have aggravated her asthma. She reports using her inhaler along with breathing treatments and reports no alleviation. Pt denies vomiting, diarrhea, back pain, and syncope.       PMHx: allergy, asthma, GERD, HTN, seizures, sleep apnea, NIDDM, high cholesterol, pseudotumor cerebri, depression, anemia, Hyperprolactinemia      The history is provided by the patient. No  was used.     Review of patient's allergies indicates:  No Known Allergies  Past Medical History:   Diagnosis Date    Allergy     Anemia     Asthma     Depression     Diabetes mellitus, type 2     Diabetes with neurologic complications     GERD (gastroesophageal reflux disease)     High cholesterol     Hyperprolactinemia     Hypertension     Pseudotumor cerebri     Seizures     Sleep apnea     Type 2 diabetes mellitus      Past Surgical History:   Procedure Laterality Date    BREAST CYST ASPIRATION       SECTION      X 1    CHOLECYSTECTOMY      SINUS SURGERY       Family History   Problem Relation Age of Onset    Cancer Mother     Hypertension Mother     Diabetes Mother     Stroke Father     Heart disease Father     COPD Father     Heart attack Father     Cancer Maternal Grandmother     Breast cancer Maternal Grandmother      Social History     Tobacco Use    Smoking status: Current Every Day Smoker     Packs/day: 1.00     Years: 15.00     Pack years:  15.00     Types: Cigarettes    Smokeless tobacco: Current User   Substance Use Topics    Alcohol use: No     Alcohol/week: 0.0 oz    Drug use: No     Review of Systems   Constitutional: Positive for fever. Negative for chills.   HENT: Negative for congestion, ear pain, rhinorrhea and sore throat.    Eyes: Negative for pain and visual disturbance.   Respiratory: Positive for cough, shortness of breath and wheezing.    Cardiovascular: Negative for chest pain.   Gastrointestinal: Negative for abdominal pain, diarrhea, nausea and vomiting.   Genitourinary: Negative for dysuria.   Musculoskeletal: Negative for back pain and neck pain.   Skin: Negative for rash.   Neurological: Positive for headaches.       Physical Exam     Initial Vitals [09/03/18 2337]   BP Pulse Resp Temp SpO2   (!) 139/99 (!) 117 (!) 36 (!) 101.1 °F (38.4 °C) (!) 89 %      MAP       --         Physical Exam    Nursing note and vitals reviewed.  Constitutional: She appears well-developed and well-nourished. She is cooperative.  Non-toxic appearance. No distress.   HENT:   Head: Normocephalic and atraumatic.   Mouth/Throat: Oropharynx is clear and moist.   Eyes: Conjunctivae and EOM are normal. Pupils are equal, round, and reactive to light.   Neck: Normal range of motion and full passive range of motion without pain. Neck supple. No thyromegaly present. No JVD present.   Cardiovascular: Normal rate, regular rhythm, normal heart sounds and normal pulses.   Pulmonary/Chest: Effort normal. No respiratory distress. She has wheezes. She has no rales.   Coarse breath sounds bilaterally   Abdominal: Soft. Normal appearance and bowel sounds are normal. She exhibits no distension and no mass. There is no tenderness.   Musculoskeletal: Normal range of motion.   Neurological: She is alert and oriented to person, place, and time. She has normal strength. No cranial nerve deficit or sensory deficit.   Skin: Skin is warm, dry and intact. No rash noted.    Psychiatric: She has a normal mood and affect. Her speech is normal and behavior is normal. Judgment and thought content normal.         ED Course   Critical Care  Date/Time: 9/4/2018 2:38 AM  Performed by: Jerome Sánchez MD  Authorized by: Jerome Sánchez MD   Direct patient critical care time: 15 minutes  Additional history critical care time: 5 minutes  Ordering / reviewing critical care time: 10 minutes  Documentation critical care time: 10 minutes  Consulting other physicians critical care time: 10 minutes  Consult with family critical care time: 5 minutes  Total critical care time (exclusive of procedural time) : 55 minutes  Critical care was time spent personally by me on the following activities: discussions with consultants, examination of patient, ordering and performing treatments and interventions, re-evaluation of patient's condition, review of old charts, pulse oximetry and ordering and review of laboratory studies.        Labs Reviewed   CBC W/ AUTO DIFFERENTIAL - Abnormal; Notable for the following components:       Result Value    Hemoglobin 11.8 (*)     Hematocrit 36.8 (*)     RDW 16.0 (*)     Lymph # 0.9 (*)     Lymph% 15.8 (*)     All other components within normal limits   BASIC METABOLIC PANEL - Abnormal; Notable for the following components:    CO2 19 (*)     Glucose 117 (*)     Calcium 8.6 (*)     All other components within normal limits   ISTAT PROCEDURE - Abnormal; Notable for the following components:    POC PH 7.313 (*)     POC PO2 76 (*)     POC HCO3 21.6 (*)     POC SATURATED O2 94 (*)     All other components within normal limits   INFLUENZA A AND B ANTIGEN          Imaging Results          X-Ray Chest 1 View (Final result)  Result time 09/04/18 01:17:40    Final result by Tiago Mendenhall MD (09/04/18 01:17:40)                 Impression:      No acute cardiopulmonary process identified.      Electronically signed by: Tiago Mendenhall MD  Date:    09/04/2018  Time:    01:17              Narrative:    EXAMINATION:  XR CHEST 1 VIEW    CLINICAL HISTORY:  Cough    TECHNIQUE:  Single frontal view of the chest was performed.    COMPARISON:  06/19/2018.    FINDINGS:  Cardiac silhouette is normal in size.  Lungs are slightly hypoinflated.  No evidence of focal consolidative process, pneumothorax, or significant effusion.  No acute osseous abnormality identified.                                 Medical Decision Making:   Differential Diagnosis:   Differential diagnosis includes asthma, pneumonia, and bronchitis.  Clinical Tests:   Lab Tests: Reviewed  The following lab test(s) were unremarkable: CBC and BMP  Radiological Study: Reviewed  ED Management:  Still wheezing after multiple nebs. WIll give epinephrine and magnesium. Will admit. D/w Dr. Rubin. Symptoms c/w URI and asthma. No sepsis workup necessary.        improvement in WOB but still has course wheezing mostly on right. Hypoxic in 5 liters on ABG with normal pCO2 on ABG which is concerning for hypoventilation. Will admit to ICU. D/w Dr. Rubin.            Scribe Attestation:   Scribe #1: I performed the above scribed service and the documentation accurately describes the services I performed. I attest to the accuracy of the note.    Attending Attestation:           Physician Attestation for Scribe:  Physician Attestation Statement for Scribe #1: I, Jerome Sánchez MD, reviewed documentation, as scribed by Glenn Corbin II in my presence, and it is both accurate and complete.                    Clinical Impression:   The primary encounter diagnosis was Asthma with severe exacerbation. Diagnoses of Cough, Fever, unspecified fever cause, Hypoxemia, and Shortness of breath were also pertinent to this visit.      Disposition:   Disposition: Admitted  Condition: Serious                        Jerome Sánchez MD  09/04/18 0239

## 2018-09-04 NOTE — ASSESSMENT & PLAN NOTE
"Not compliant with CPAP due to "traumatic experience" with CPAP titration as outpatient  [facility]    "

## 2018-09-04 NOTE — ED TRIAGE NOTES
Arrived via personal transportation. SOB that started yesterday. PMH of Asthma. Left sided chest pain that started yesterday. Also reports nausea

## 2018-09-04 NOTE — ASSESSMENT & PLAN NOTE
May be secondary to reactive airways disease but given her underlying DM2 and cardiovascular disease, MI needs to be ruled out. Will obtain EKG and troponin now.

## 2018-09-04 NOTE — NURSING
Patient awake, alert and oriented to person, place, time and situation. Patient vital signs are stable, afebrile, pain assessed on scale of 0-10. Patient family at the bedside throughout the day. Patient remains free of falls, injuries and breakdown throughout shift.

## 2018-09-04 NOTE — ASSESSMENT & PLAN NOTE
Evidently causing a negative impact in patient's health  Needs to address weight loss options with PCP

## 2018-09-04 NOTE — SUBJECTIVE & OBJECTIVE
Past Medical History:   Diagnosis Date    Allergy     Anemia     Asthma     Depression     Diabetes mellitus, type 2     Diabetes with neurologic complications     GERD (gastroesophageal reflux disease)     High cholesterol     Hyperprolactinemia     Hypertension     Pseudotumor cerebri     Seizures     Sleep apnea     Type 2 diabetes mellitus        Past Surgical History:   Procedure Laterality Date    BREAST CYST ASPIRATION       SECTION      X 1    CHOLECYSTECTOMY      SINUS SURGERY         Review of patient's allergies indicates:  No Known Allergies    No current facility-administered medications on file prior to encounter.      Current Outpatient Medications on File Prior to Encounter   Medication Sig    albuterol 90 mcg/actuation inhaler Inhale 2 puffs into the lungs every 6 (six) hours as needed for Wheezing. Rescue    albuterol-ipratropium 2.5mg-0.5mg/3mL (DUO-NEB) 0.5 mg-3 mg(2.5 mg base)/3 mL nebulizer solution Take 3 mLs by nebulization every 6 (six) hours as needed for Wheezing or Shortness of Breath. Rescue    acetaZOLAMIDE (DIAMOX) 500 mg CpSR Take 1 capsule (500 mg total) by mouth 2 (two) times daily.    blood sugar diagnostic Strp 1 each by Misc.(Non-Drug; Combo Route) route 4 (four) times daily before meals and nightly. ACCU CHEK ELVIA PLUS METER    blood-glucose meter (ACCU-CHEK ELVIA PLUS METER) Misc TEST FOUR TIMES DAILY BEFORE MEALS AND EVERY NIGHT    budesonide-formoterol 160-4.5 mcg (SYMBICORT) 160-4.5 mcg/actuation HFAA Inhale 2 puffs into the lungs every 12 (twelve) hours. Controller    buPROPion (WELLBUTRIN XL) 150 MG TB24 tablet Take 1 tablet (150 mg total) by mouth once daily.    capsaicin 0.1 % Crea Apply 1 application topically 2 (two) times daily.    cetirizine (ZYRTEC) 10 MG tablet Take 1 tablet (10 mg total) by mouth 2 (two) times daily.    hydroCHLOROthiazide (HYDRODIURIL) 25 MG tablet Take 1 tablet (25 mg total) by mouth once daily.     hydrOXYzine (ATARAX) 50 MG tablet Take 1-4 at night for itching.    hydrOXYzine HCl (ATARAX) 25 MG tablet TK 1 TO 8 TS PO HS. START WITH 3 TS THEN INCREASE OR DECREASE AS NEEDED UNTIL ITCHING IS CONTROLLED OR MAX OF 8 TABLETS    lancets (ACCU-CHEK SOFTCLIX LANCETS) Misc 1 Device by Misc.(Non-Drug; Combo Route) route 4 (four) times daily.    lancets 25 gauge Misc 1 lancet by Misc.(Non-Drug; Combo Route) route 4 (four) times daily before meals and nightly.    LINZESS 290 mcg Cap TK ONE CAPSULE PO D ON AN EMPTY STOMACH    losartan (COZAAR) 100 MG tablet TAKE 1 TABLET BY MOUTH EVERY DAY    magnesium oxide (MAG-OX) 400 mg tablet Take 1 tablet (400 mg total) by mouth once daily.    meloxicam (MOBIC) 15 MG tablet TAKE 1 TABLET(15 MG) BY MOUTH DAILY AS NEEDED FOR HEADACHE    metformin (GLUCOPHAGE-XR) 500 MG 24 hr tablet Take 2 tablets (1,000 mg total) by mouth 2 (two) times daily with meals.    mometasone (NASONEX) 50 mcg/actuation nasal spray 2 sprays by Nasal route once daily.    mometasone 0.1% (ELOCON) 0.1 % cream     naproxen (NAPROSYN) 500 MG tablet Take 1 tablet (500 mg total) by mouth 3 (three) times daily as needed.    NOVOLOG FLEXPEN 100 unit/mL InPn pen ADM 15 UNITS SC TID B MEALS    oxyCODONE-acetaminophen (PERCOCET)  mg per tablet     pantoprazole (PROTONIX) 40 MG tablet Take 40 mg by mouth once daily.    quetiapine (SEROQUEL) 25 MG Tab Take 1 tablet (25 mg total) by mouth every evening.    sumatriptan (IMITREX) 50 MG tablet Take 1 tab at onset of headaches.  If no improvement in 2 hours, take another.  Do not take more than 2 tabs in 24 hours.    TAZTIA  mg CpSR TK ONE CAPSULE PO D    tiotropium (SPIRIVA) 18 mcg inhalation capsule Inhale 1 capsule (18 mcg total) into the lungs once daily. Controller    tiZANidine (ZANAFLEX) 4 MG tablet     topiramate (TOPAMAX) 100 MG tablet Take 3 tablets (300 mg total) by mouth 2 (two) times daily.    venlafaxine (EFFEXOR-XR) 75 MG 24 hr  capsule Take 1 capsule (75 mg total) by mouth every evening.     Family History     Problem Relation (Age of Onset)    Breast cancer Maternal Grandmother    COPD Father    Cancer Mother, Maternal Grandmother    Diabetes Mother    Heart attack Father    Heart disease Father    Hypertension Mother    Stroke Father        Tobacco Use    Smoking status: Current Every Day Smoker     Packs/day: 1.00     Years: 15.00     Pack years: 15.00     Types: Cigarettes    Smokeless tobacco: Current User   Substance and Sexual Activity    Alcohol use: No     Alcohol/week: 0.0 oz    Drug use: No    Sexual activity: Yes     Partners: Male     Birth control/protection: None     Review of Systems   Constitutional: Positive for fever.   HENT: Positive for sore throat.    Respiratory: Positive for cough, shortness of breath and wheezing.    Cardiovascular: Positive for chest pain. Negative for palpitations and leg swelling.   Gastrointestinal: Negative.    Genitourinary: Negative.    Musculoskeletal: Negative.    Neurological: Negative.    Hematological: Negative.    Psychiatric/Behavioral: Negative.      Objective:     Vital Signs (Most Recent):  Temp: 98.3 °F (36.8 °C) (09/04/18 0711)  Pulse: 90 (09/04/18 0845)  Resp: (!) 23 (09/04/18 0745)  BP: (!) 144/92 (09/04/18 0845)  SpO2: 96 % (09/04/18 0845) Vital Signs (24h Range):  Temp:  [98.3 °F (36.8 °C)-101.1 °F (38.4 °C)] 98.3 °F (36.8 °C)  Pulse:  [] 90  Resp:  [18-36] 23  SpO2:  [87 %-96 %] 96 %  BP: (139-180)/() 144/92     Weight: (!) 172.8 kg (380 lb 15.3 oz)  Body mass index is 65.39 kg/m².    Physical Exam   Constitutional: She is oriented to person, place, and time. She appears well-developed. No distress.   Morbid obesity  Non toxic appearing   Eyes: Conjunctivae and EOM are normal.   Neck: Normal range of motion.   Cardiovascular: Normal rate and regular rhythm.   Pulmonary/Chest: No stridor. No respiratory distress. She has wheezes. She has no rales.   Speaking  mid sentences with use of accessory muscles   On low flow NC   Abdominal: Soft. Bowel sounds are normal.   Musculoskeletal: Normal range of motion. She exhibits no edema.   Neurological: She is alert and oriented to person, place, and time.   Skin: Skin is warm and dry. Capillary refill takes less than 2 seconds. She is not diaphoretic.   Psychiatric: She has a normal mood and affect. Her behavior is normal. Judgment and thought content normal.   Nursing note and vitals reviewed.        CRANIAL NERVES     CN III, IV, VI   Extraocular motions are normal.        Significant Labs: All pertinent labs within the past 24 hours have been reviewed.    Significant Imaging: I have reviewed all pertinent imaging results/findings within the past 24 hours.  I have reviewed and interpreted all pertinent imaging results/findings within the past 24 hours.

## 2018-09-05 LAB
POCT GLUCOSE: 155 MG/DL (ref 70–110)
POCT GLUCOSE: 191 MG/DL (ref 70–110)
POCT GLUCOSE: 192 MG/DL (ref 70–110)
POCT GLUCOSE: 218 MG/DL (ref 70–110)

## 2018-09-05 PROCEDURE — 25000003 PHARM REV CODE 250: Performed by: INTERNAL MEDICINE

## 2018-09-05 PROCEDURE — 25000003 PHARM REV CODE 250: Performed by: EMERGENCY MEDICINE

## 2018-09-05 PROCEDURE — S4991 NICOTINE PATCH NONLEGEND: HCPCS | Performed by: INTERNAL MEDICINE

## 2018-09-05 PROCEDURE — 63600175 PHARM REV CODE 636 W HCPCS: Performed by: INTERNAL MEDICINE

## 2018-09-05 PROCEDURE — 94761 N-INVAS EAR/PLS OXIMETRY MLT: CPT

## 2018-09-05 PROCEDURE — 93005 ELECTROCARDIOGRAM TRACING: CPT

## 2018-09-05 PROCEDURE — 25000242 PHARM REV CODE 250 ALT 637 W/ HCPCS: Performed by: INTERNAL MEDICINE

## 2018-09-05 PROCEDURE — 25000242 PHARM REV CODE 250 ALT 637 W/ HCPCS: Performed by: EMERGENCY MEDICINE

## 2018-09-05 PROCEDURE — 94640 AIRWAY INHALATION TREATMENT: CPT

## 2018-09-05 PROCEDURE — 12000002 HC ACUTE/MED SURGE SEMI-PRIVATE ROOM

## 2018-09-05 PROCEDURE — 63600175 PHARM REV CODE 636 W HCPCS: Performed by: EMERGENCY MEDICINE

## 2018-09-05 RX ORDER — PREDNISONE 20 MG/1
20 TABLET ORAL DAILY
Status: DISCONTINUED | OUTPATIENT
Start: 2018-09-05 | End: 2018-09-05

## 2018-09-05 RX ORDER — FLUTICASONE FUROATE AND VILANTEROL 200; 25 UG/1; UG/1
1 POWDER RESPIRATORY (INHALATION) DAILY
Status: DISCONTINUED | OUTPATIENT
Start: 2018-09-05 | End: 2018-09-06 | Stop reason: HOSPADM

## 2018-09-05 RX ORDER — IPRATROPIUM BROMIDE AND ALBUTEROL SULFATE 2.5; .5 MG/3ML; MG/3ML
3 SOLUTION RESPIRATORY (INHALATION) EVERY 4 HOURS PRN
Status: DISCONTINUED | OUTPATIENT
Start: 2018-09-05 | End: 2018-09-06 | Stop reason: HOSPADM

## 2018-09-05 RX ORDER — PREDNISONE 20 MG/1
20 TABLET ORAL DAILY
Status: DISCONTINUED | OUTPATIENT
Start: 2018-09-05 | End: 2018-09-06 | Stop reason: HOSPADM

## 2018-09-05 RX ADMIN — TOPIRAMATE 300 MG: 100 TABLET, FILM COATED ORAL at 08:09

## 2018-09-05 RX ADMIN — PREDNISONE 20 MG: 20 TABLET ORAL at 01:09

## 2018-09-05 RX ADMIN — ACETAZOLAMIDE 500 MG: 500 CAPSULE, EXTENDED RELEASE ORAL at 08:09

## 2018-09-05 RX ADMIN — INSULIN ASPART 5 UNITS: 100 INJECTION, SOLUTION INTRAVENOUS; SUBCUTANEOUS at 07:09

## 2018-09-05 RX ADMIN — METHYLPREDNISOLONE SODIUM SUCCINATE 40 MG: 40 INJECTION, POWDER, FOR SOLUTION INTRAMUSCULAR; INTRAVENOUS at 06:09

## 2018-09-05 RX ADMIN — ENOXAPARIN SODIUM 40 MG: 100 INJECTION SUBCUTANEOUS at 09:09

## 2018-09-05 RX ADMIN — CETIRIZINE HYDROCHLORIDE 10 MG: 10 TABLET, FILM COATED ORAL at 08:09

## 2018-09-05 RX ADMIN — ENOXAPARIN SODIUM 40 MG: 100 INJECTION SUBCUTANEOUS at 08:09

## 2018-09-05 RX ADMIN — FAMOTIDINE 20 MG: 20 TABLET ORAL at 08:09

## 2018-09-05 RX ADMIN — FAMOTIDINE 20 MG: 20 TABLET ORAL at 09:09

## 2018-09-05 RX ADMIN — QUETIAPINE FUMARATE 25 MG: 25 TABLET ORAL at 09:09

## 2018-09-05 RX ADMIN — IPRATROPIUM BROMIDE AND ALBUTEROL SULFATE 3 ML: .5; 2.5 SOLUTION RESPIRATORY (INHALATION) at 04:09

## 2018-09-05 RX ADMIN — HYDROCHLOROTHIAZIDE 25 MG: 25 TABLET ORAL at 08:09

## 2018-09-05 RX ADMIN — VENLAFAXINE HYDROCHLORIDE 75 MG: 75 CAPSULE, EXTENDED RELEASE ORAL at 09:09

## 2018-09-05 RX ADMIN — LOSARTAN POTASSIUM 50 MG: 25 TABLET, FILM COATED ORAL at 08:09

## 2018-09-05 RX ADMIN — NICOTINE 1 PATCH: 21 PATCH, EXTENDED RELEASE TRANSDERMAL at 08:09

## 2018-09-05 RX ADMIN — IPRATROPIUM BROMIDE AND ALBUTEROL SULFATE 3 ML: .5; 2.5 SOLUTION RESPIRATORY (INHALATION) at 07:09

## 2018-09-05 RX ADMIN — TOPIRAMATE 300 MG: 100 TABLET, FILM COATED ORAL at 09:09

## 2018-09-05 RX ADMIN — INSULIN ASPART 1 UNITS: 100 INJECTION, SOLUTION INTRAVENOUS; SUBCUTANEOUS at 09:09

## 2018-09-05 RX ADMIN — FLUTICASONE FUROATE AND VILANTEROL TRIFENATATE 1 PUFF: 200; 25 POWDER RESPIRATORY (INHALATION) at 11:09

## 2018-09-05 NOTE — SUBJECTIVE & OBJECTIVE
Interval History: still wheezing but feels better and able to ambulate without difficulty. Is at RA. States her sinuses are dry. No complaints of chest pain toda. EKG and trop done yesterday did not suggest ACS.     Review of Systems   Constitutional: Negative for fever.   HENT: Negative for rhinorrhea and sore throat.    Respiratory: Positive for wheezing. Negative for cough and shortness of breath.    Cardiovascular: Negative for chest pain, palpitations and leg swelling.   Gastrointestinal: Negative.    Genitourinary: Negative.    Musculoskeletal: Negative.    Neurological: Negative.    Hematological: Negative.    Psychiatric/Behavioral: Negative.      Objective:     Vital Signs (Most Recent):  Temp: 98.6 °F (37 °C) (09/05/18 1115)  Pulse: 91 (09/05/18 1200)  Resp: 20 (09/05/18 1200)  BP: 131/77 (09/05/18 1200)  SpO2: 97 % (09/05/18 1215) Vital Signs (24h Range):  Temp:  [98.2 °F (36.8 °C)-98.6 °F (37 °C)] 98.6 °F (37 °C)  Pulse:  [] 91  Resp:  [12-45] 20  SpO2:  [90 %-100 %] 97 %  BP: (118-164)/() 131/77     Weight: (!) 165.2 kg (364 lb 3.2 oz)  Body mass index is 62.51 kg/m².    Intake/Output Summary (Last 24 hours) at 9/5/2018 1349  Last data filed at 9/5/2018 1200  Gross per 24 hour   Intake 350 ml   Output --   Net 350 ml      Physical Exam   Constitutional: She is oriented to person, place, and time. She appears well-developed. No distress.   Morbid obesity  Non toxic appearing   Eyes: Conjunctivae and EOM are normal.   Neck: Normal range of motion.   Cardiovascular: Normal rate and regular rhythm.   Pulmonary/Chest: Effort normal. No stridor. No respiratory distress. She has wheezes. She has no rales.   Speaking full sentences with no accessory muscle use  At room air   Abdominal: Soft. Bowel sounds are normal.   Musculoskeletal: Normal range of motion. She exhibits no edema.   Neurological: She is alert and oriented to person, place, and time.   Skin: Skin is warm and dry. Capillary refill  takes less than 2 seconds. She is not diaphoretic.   Psychiatric: She has a normal mood and affect. Her behavior is normal. Judgment and thought content normal.   Nursing note and vitals reviewed.      Significant Labs: All pertinent labs within the past 24 hours have been reviewed.    Significant Imaging: I have reviewed all pertinent imaging results/findings within the past 24 hours.  I have reviewed and interpreted all pertinent imaging results/findings within the past 24 hours.

## 2018-09-05 NOTE — PROGRESS NOTES
[facility]  Progress Note    Patient Name: Yanni Kaiser  MRN: [id number]  Patient Class: IP- Inpatient   Admission Date: 9/3/2018  Length of Stay: 1 days  Attending Physician: Deepa Wilson MD  Primary Care Provider: Carlyle Gordillo MD        Subjective:     Principal Problem:Acute respiratory failure with hypoxia    HPI:  47 yo female with SHARATH (not on CPAP), morbid obesity, tobacco use disorder, asthma, diabetes mellitus type 2, hypertension, narcotic dependence, chronic back pain (Lumbar spine stenosis), depression, migraine headaches, pseudotumor cerebri who presented with worsening shortness of breath of one day in evolution. Patient stated she was recently exposed to children who had cough and sore throat. She stated she developed sore throat and cough, which eventually progressed to wheezing and shortness of breath despite use of bronchodilators. Also reported left sided chest pain. No Hx of MI or respiratory failure requiring intubation. Still smokes. Not on CPAP at home due to intolerance to titration and did not follow up (at [facility]). Is chronically fatigued and sleepy during daytime. Is on percocet and muscle relaxants for chronic back pain. Denied rhinorrhea, rash, confusion, LOC, hemoptysis, nausea, vomiting, abdominal pain, diarrhea.     In the ED, patient was noted to be febrile to 101.1F, in distress, hypertensive and hypoxic to 89%. ABG with metabolic acidosis and mild acidemia with no respiratory compensation. Evidently wheezing. Placed on a mask (not sure if simple or non rebreather, but per chart review is definitely not BiPAP or CPAP), given solumedrol, continuous nebs, epinephrine 0.3 mg IM and Mag 2 mg IV. No EKG or troponin ordered in the ED. Patient showed some improvement after this. Patient was admitted to ICU for close observation.     Hospital Course:  Ms Kaiser presented with acute respiratory failure with hypoxia secondary to asthma  exacerbation. Likely exacerbated by viral upper respiratory tract infection. Also an active cigarette smoker, is morbidly obese and has SHARATH not treated with CPAP. Respiratory distress improved with supplemental O2, solumedrol, continuous nebs, epinephrine 0.3 mg IM and Mag 2 mg IV but still with plenty of residual wheezing. Admitted to ICU for close observation. Continued on scheduled duo-nebs q 4 hrs, solumedrol 40 mg TID and supplemental O2 via low flow NC, and left on bed rest status. Weaned off NC to room air. Still wheezing but overall felt better and able to ambulate short distances. Is very motivated to quit smoking and will enroll in smoking cessation program (she already contacted them to make an appointment). Steroids de-escalated to prednisone 20 mg daily (40 mg would make BG too high), ICS/LABA and changed duo-nebs to PRN. Stepped down to floor on 9/5.         Interval History: still wheezing but feels better and able to ambulate without difficulty. Is at RA. States her sinuses are dry. No complaints of chest pain toda. EKG and trop done yesterday did not suggest ACS.     Review of Systems   Constitutional: Negative for fever.   HENT: Negative for rhinorrhea and sore throat.    Respiratory: Positive for wheezing. Negative for cough and shortness of breath.    Cardiovascular: Negative for chest pain, palpitations and leg swelling.   Gastrointestinal: Negative.    Genitourinary: Negative.    Musculoskeletal: Negative.    Neurological: Negative.    Hematological: Negative.    Psychiatric/Behavioral: Negative.      Objective:     Vital Signs (Most Recent):  Temp: 98.6 °F (37 °C) (09/05/18 1115)  Pulse: 91 (09/05/18 1200)  Resp: 20 (09/05/18 1200)  BP: 131/77 (09/05/18 1200)  SpO2: 97 % (09/05/18 1215) Vital Signs (24h Range):  Temp:  [98.2 °F (36.8 °C)-98.6 °F (37 °C)] 98.6 °F (37 °C)  Pulse:  [] 91  Resp:  [12-45] 20  SpO2:  [90 %-100 %] 97 %  BP: (118-164)/() 131/77     Weight: (!) 165.2 kg  (364 lb 3.2 oz)  Body mass index is 62.51 kg/m².    Intake/Output Summary (Last 24 hours) at 9/5/2018 1349  Last data filed at 9/5/2018 1200  Gross per 24 hour   Intake 350 ml   Output --   Net 350 ml      Physical Exam   Constitutional: She is oriented to person, place, and time. She appears well-developed. No distress.   Morbid obesity  Non toxic appearing   Eyes: Conjunctivae and EOM are normal.   Neck: Normal range of motion.   Cardiovascular: Normal rate and regular rhythm.   Pulmonary/Chest: Effort normal. No stridor. No respiratory distress. She has wheezes. She has no rales.   Speaking full sentences with no accessory muscle use  At room air   Abdominal: Soft. Bowel sounds are normal.   Musculoskeletal: Normal range of motion. She exhibits no edema.   Neurological: She is alert and oriented to person, place, and time.   Skin: Skin is warm and dry. Capillary refill takes less than 2 seconds. She is not diaphoretic.   Psychiatric: She has a normal mood and affect. Her behavior is normal. Judgment and thought content normal.   Nursing note and vitals reviewed.      Significant Labs: All pertinent labs within the past 24 hours have been reviewed.    Significant Imaging: I have reviewed all pertinent imaging results/findings within the past 24 hours.  I have reviewed and interpreted all pertinent imaging results/findings within the past 24 hours.    Assessment/Plan:      * Acute respiratory failure with hypoxia    due to severe asthma exacerbation secondary to viral URI  Hypoxia improved with bronchodilators, supplemental O2, Magnesium IV and solumedrol  Will de-esc steroids to prednisone 20 mg (40 mg would make BG too high), change duo-nebs to PRN and start LABA/ICS.   Smoking cessation very important. Is motivated to quit  Allow patient to ambulate to see how she feels at room air  Needs to lose weight (BMI > 60)                Asthma with severe exacerbation    As above            Chest pain    May be  "secondary to reactive airways disease   EKG and trop do not suggest ACS          Tobacco use disorder, moderate, dependence    Still smoking but very motivated to quit  She called smoking cessation program to make appointment  Will prescribe nicotine patch on discharge          SHARATH (obstructive sleep apnea)    Not compliant with CPAP due to "traumatic experience" with CPAP titration as outpatient  This was done at [facility]            Lumbar stenosis    No acute issues  Bed rest for now  Home Percocet and muscle relaxant judiciously           Opioid dependence    As above          Type 2 diabetes mellitus with stage 3 chronic kidney disease, with long-term current use of insulin    Renal function stable  Hold metformin while inpatient  insulin and adjust as needed for goal BG < 180  Is on steroids for severe asthma exacerbation          GERD (gastroesophageal reflux disease)    No acute issues  Agree with famotidine          Depression    No acute issues  Resume effexor and seroquel (patient not taking wellbutrin)            Combined hyperlipidemia associated with type 2 diabetes mellitus    Resume statin          Essential hypertension    Initially hypertensive but now better  Continue home losartan and HCTZ          Idiopathic intracranial hypertension    No acute issues  Resume home diamox          Morbid obesity with BMI of 60.0-69.9, adult    Evidently causing a negative impact in patient's health  Needs to address weight loss options with PCP          Migraine    No acute issues  Resume home topamx            VTE Risk Mitigation (From admission, onward)        Ordered     enoxaparin injection 40 mg  Every 12 hours      09/04/18 0514     IP VTE HIGH RISK PATIENT  Once      09/04/18 0356        Stable for step down to floor today    Critical care time spent on the evaluation and treatment of severe organ dysfunction, review of pertinent labs and imaging studies, discussions with consulting providers and " discussions with patient/family: > 35 minutes.    Deepa Hinojosa MD  Department of Hospital Medicine   [facility]

## 2018-09-05 NOTE — PLAN OF CARE
Problem: Patient Care Overview  Goal: Plan of Care Review  Outcome: Ongoing (interventions implemented as appropriate)   09/05/18 1241   Coping/Psychosocial   Plan Of Care Reviewed With patient       Problem: Asthma (Adult)  Goal: Signs and Symptoms of Listed Potential Problems Will be Absent, Minimized or Managed (Asthma)  Signs and symptoms of listed potential problems will be absent, minimized or managed by discharge/transition of care (reference Asthma (Adult) CPG).  Outcome: Ongoing (interventions implemented as appropriate)   09/05/18 0624   Asthma   Problems Assessed (Asthma) hypoxia/hypoxemia;recurrent exacerbation   Problems Present (Asthma) other (see comments)  (SOB with exertion)

## 2018-09-05 NOTE — ASSESSMENT & PLAN NOTE
due to severe asthma exacerbation secondary to viral URI  Hypoxia improved with bronchodilators, supplemental O2, Magnesium IV and solumedrol  Will de-esc steroids to prednisone 20 mg (40 mg would make BG too high), change duo-nebs to PRN and start LABA/ICS.   Smoking cessation very important. Is motivated to quit  Allow patient to ambulate to see how she feels at room air  Needs to lose weight (BMI > 60)

## 2018-09-05 NOTE — NURSING TRANSFER
Nursing Transfer Note      9/5/2018   @ 1350  Transfer To: 415b    Transfer via bed    Transfer with none    Transported by transport    Medicines sent: yes    Chart send with patient: Yes    Notified: spouse    Patient reassessed at:upon arrival     Upon arrival to floor: see arrival note

## 2018-09-05 NOTE — ASSESSMENT & PLAN NOTE
"Not compliant with CPAP due to "traumatic experience" with CPAP titration as outpatient  This was done at [facility]"

## 2018-09-05 NOTE — ASSESSMENT & PLAN NOTE
Renal function stable  Hold metformin while inpatient  insulin and adjust as needed for goal BG < 180  Is on steroids for severe asthma exacerbation

## 2018-09-05 NOTE — ASSESSMENT & PLAN NOTE
Still smoking but very motivated to quit  She called smoking cessation program to make appointment  Will prescribe nicotine patch on discharge

## 2018-09-05 NOTE — PLAN OF CARE
Problem: Patient Care Overview  Goal: Plan of Care Review  Outcome: Ongoing (interventions implemented as appropriate)  Plan of care reviewed. No falls/injuries this shift. Skin intact. Pt transferring to med surg room 415 b. On room air. Tolerating po diet. Able to ambulate independently with nurse in room. Pt with wheezing auscultated, per md medications adjusted.

## 2018-09-05 NOTE — PLAN OF CARE
"   09/05/18 1400   Discharge Assessment   Assessment Type Discharge Planning Assessment   Confirmed/corrected address and phone number on facesheet? Yes   Assessment information obtained from? Patient   Expected Length of Stay (days) 2   Communicated expected length of stay with patient/caregiver yes   Prior to hospitilization cognitive status: Alert/Oriented   Prior to hospitalization functional status: Independent   Current cognitive status: Alert/Oriented   Current Functional Status: Needs Assistance   Facility Arrived From: home   Lives With spouse   Able to Return to Prior Arrangements yes   Is patient able to care for self after discharge? Yes   Who are your caregiver(s) and their phone number(s)?  Mathieu [phone], daughters if calls them   Patient's perception of discharge disposition home or selfcare   Readmission Within The Last 30 Days no previous admission in last 30 days   Patient currently being followed by outpatient case management? No   Patient currently receives any other outside agency services? No   Equipment Currently Used at Home glucometer;nebulizer   Do you have any problems affording any of your prescribed medications? No   Is the patient taking medications as prescribed? yes   Does the patient have transportation home? Yes   Transportation Available family or friend will provide   Discharge Plan A Home with family   Discharge Plan B Other  (to be determined if condition changes)   Patient/Family In Agreement With Plan yes   PHOEBE met with patient and  in ICU, explained role of SW/CM with treatment team, provided contact information with the "discharge planning begins on admission" checklist handout.   Confirmed information in demographics: updating.   PHOEBE reviewed the discharge planning folder, explained to leave in room with patient so that team/patient can place all written discharge information throughout hospital stay. Provided education regarding the importance of using written " discharge information to help manage health care at home.   SW provided education regarding the importance of obtaining, taking all medications at discharge. Preferred pharmacy is   [id number] - [place] [address] AT [facility] & [address]  [place] [address]  Phone: [phone] Fax: [phone]    [facility] - [facility] [address]  [address] [facility] [address]  Phone: [phone] Fax: [phone]    [facility] [place] - [address] [facility] [address] [facility] [address]  [address]  [facility] [address]  Phone: [phone] Fax: [phone]  .  SW provided education regarding importance of going to all follow up appointments to help manage health care at home.     SW provided written education regarding symptoms of problems with asthma to seek medical care. Patient able to articulate all listed     SW will follow in ICU and assist as needed.

## 2018-09-06 VITALS
HEIGHT: 64 IN | WEIGHT: 293 LBS | HEART RATE: 92 BPM | OXYGEN SATURATION: 92 % | BODY MASS INDEX: 50.02 KG/M2 | TEMPERATURE: 99 F | RESPIRATION RATE: 18 BRPM | SYSTOLIC BLOOD PRESSURE: 148 MMHG | DIASTOLIC BLOOD PRESSURE: 90 MMHG

## 2018-09-06 PROBLEM — J96.01 ACUTE RESPIRATORY FAILURE WITH HYPOXIA: Status: RESOLVED | Noted: 2018-09-04 | Resolved: 2018-09-06

## 2018-09-06 LAB
POCT GLUCOSE: 122 MG/DL (ref 70–110)
POCT GLUCOSE: 154 MG/DL (ref 70–110)
POCT GLUCOSE: 92 MG/DL (ref 70–110)

## 2018-09-06 PROCEDURE — 63600175 PHARM REV CODE 636 W HCPCS: Performed by: INTERNAL MEDICINE

## 2018-09-06 PROCEDURE — 94640 AIRWAY INHALATION TREATMENT: CPT

## 2018-09-06 PROCEDURE — S4991 NICOTINE PATCH NONLEGEND: HCPCS | Performed by: INTERNAL MEDICINE

## 2018-09-06 PROCEDURE — 94761 N-INVAS EAR/PLS OXIMETRY MLT: CPT

## 2018-09-06 PROCEDURE — 25000003 PHARM REV CODE 250: Performed by: INTERNAL MEDICINE

## 2018-09-06 PROCEDURE — 25000003 PHARM REV CODE 250: Performed by: EMERGENCY MEDICINE

## 2018-09-06 PROCEDURE — 63600175 PHARM REV CODE 636 W HCPCS: Performed by: EMERGENCY MEDICINE

## 2018-09-06 PROCEDURE — 25000242 PHARM REV CODE 250 ALT 637 W/ HCPCS: Performed by: INTERNAL MEDICINE

## 2018-09-06 RX ORDER — IPRATROPIUM BROMIDE AND ALBUTEROL SULFATE 2.5; .5 MG/3ML; MG/3ML
3 SOLUTION RESPIRATORY (INHALATION) EVERY 6 HOURS PRN
Qty: 100 ML | Refills: 3 | Status: SHIPPED | OUTPATIENT
Start: 2018-09-06 | End: 2021-01-06

## 2018-09-06 RX ORDER — PREDNISONE 20 MG/1
20 TABLET ORAL DAILY
Qty: 4 TABLET | Refills: 0 | Status: SHIPPED | OUTPATIENT
Start: 2018-09-07 | End: 2018-09-10 | Stop reason: ALTCHOICE

## 2018-09-06 RX ORDER — IBUPROFEN 200 MG
1 TABLET ORAL DAILY
Refills: 0 | COMMUNITY
Start: 2018-09-07 | End: 2018-09-06

## 2018-09-06 RX ORDER — IBUPROFEN 200 MG
1 TABLET ORAL DAILY
Qty: 30 PATCH | Refills: 0 | Status: SHIPPED | OUTPATIENT
Start: 2018-09-07 | End: 2018-10-07

## 2018-09-06 RX ADMIN — ACETAZOLAMIDE 500 MG: 500 CAPSULE, EXTENDED RELEASE ORAL at 06:09

## 2018-09-06 RX ADMIN — IPRATROPIUM BROMIDE AND ALBUTEROL SULFATE 3 ML: .5; 2.5 SOLUTION RESPIRATORY (INHALATION) at 06:09

## 2018-09-06 RX ADMIN — ENOXAPARIN SODIUM 40 MG: 100 INJECTION SUBCUTANEOUS at 08:09

## 2018-09-06 RX ADMIN — NICOTINE 1 PATCH: 21 PATCH, EXTENDED RELEASE TRANSDERMAL at 08:09

## 2018-09-06 RX ADMIN — FAMOTIDINE 20 MG: 20 TABLET ORAL at 08:09

## 2018-09-06 RX ADMIN — LOSARTAN POTASSIUM 50 MG: 25 TABLET, FILM COATED ORAL at 08:09

## 2018-09-06 RX ADMIN — IPRATROPIUM BROMIDE AND ALBUTEROL SULFATE 3 ML: .5; 2.5 SOLUTION RESPIRATORY (INHALATION) at 01:09

## 2018-09-06 RX ADMIN — PREDNISONE 20 MG: 20 TABLET ORAL at 08:09

## 2018-09-06 RX ADMIN — TOPIRAMATE 300 MG: 100 TABLET, FILM COATED ORAL at 08:09

## 2018-09-06 RX ADMIN — ACETAZOLAMIDE 500 MG: 500 CAPSULE, EXTENDED RELEASE ORAL at 08:09

## 2018-09-06 RX ADMIN — HYDROCHLOROTHIAZIDE 25 MG: 25 TABLET ORAL at 08:09

## 2018-09-06 NOTE — PLAN OF CARE
09/06/18 1118   Discharge Reassessment   Assessment Type Discharge Planning Reassessment   Do you have any problems affording any of your prescribed medications? No   Discharge Plan A Home with family   Discharge Plan B (ICU stepdown. Monitoring.)   Patient choice form signed by patient/caregiver N/A   Can the patient answer the patient profile reliably? Yes, cognitively intact   How does the patient rate their overall health at the present time? Good   Describe the patient's ability to walk at the present time. No restrictions   How often would a person be available to care for the patient? Whenever needed   Number of comorbid conditions (as recorded on the chart) Four

## 2018-09-06 NOTE — PLAN OF CARE
Problem: Patient Care Overview  Goal: Plan of Care Review  Outcome: Ongoing (interventions implemented as appropriate)  Resting well this shift. Wheezing improved but cough still present. Elevated blood sugar managed with scheduled and sliding scale insulin. VSSAF. Remains free from falls or trauma.

## 2018-09-06 NOTE — PHYSICIAN QUERY
PT Name: Yanni Kaiser  MR #: [id number]    Physician Query Form - Asthma Clarification      CDS/: Nadine Butler RN CDI      Contact information: [email or web address]  This form is a permanent document in the medical record.    Query Date: September 6, 2018    By submitting this query, we are merely seeking further clarification of documentation. Please utilize your independent clinical judgment when addressing the question(s) below.    The Medical Record contains the following:     Indicators Supporting Clinical Findings Location in Medical Record   X Asthma or Reactive Airway Disease documented Asthma with severe exacerbation    As above  Plan to resume home symbicort and spiriva once better from wheezing and SOB   H&P   X Acute/Chronic Illness SHARATH (not on CPAP)  morbid obesity   tobacco use disorder  Asthma  diabetes mellitus type 2   hypertension   narcotic dependence  chronic back pain (Lumbar spine stenosis),  Depression  migraine headaches   pseudotumor cerebri      H&P   X Radiology Findings Cardiac silhouette is normal in size.  Lungs are slightly hypoinflated.  No evidence of focal consolidative process, pneumothorax, or significant effusion.  No acute osseous abnormality identified.   Chest xray 9/4   X RR     Blood Gases     O2 sats RR - 36  O2 sat 89% room air    9/4 0223  PH - 7.313  PCO2 - 42.6  PO2 - 76  HCO3 - 21.6  Sat 94 Initial er vital signs   X BiPAP/Intubation/Supplemental O2 Will add BiPAP for night use but I am concerned patient will refuse this given her experience with CPAP and  Supplemental O2   H&P   X SOB, Wheezing, Productive Cough, Use of Accessory Muscles, Respiratory Distress, Hypoxia, etc. Pulmonary/Chest: No stridor. No respiratory distress. She has wheezes. She has no rales.   Speaking mid sentences with use of accessory muscles      H&P   X Treatment Hypoxia improved with bronchodilators, supplemental O2, Magnesium IV and solumedrol, but still wheezing  plenty  Will continue with solumedrol 40 mg TID, duo-nebs every 4 hours and supplemental O2  Will add BiPAP for night use but I am concerned patient will refuse this given her experience with CPAP   Continuous O2 sats  Patient is chronically on muscles relaxants, seroquel and narcotics. Use judiciously to avoid hypoventilation  Needs to lose weight (BMI > 60)  Observe in ICU overnight.  H&P    Other     Provider, please specify diagnosis or diagnoses associated with above clinical findings.      [  ]  Mild intermittent asthma, with acute exacerbation  [  ]  Mild intermittent asthma, with status asthmaticus      [  ]  Mild persistent asthma, with acute exacerbation  [  ]  Mild persistent asthma, with status asthmaticus      [  ]  Moderate persistent asthma, with acute exacerbation  [  ]  Moderate persistent asthma, with status asthmaticus      [  ]  Severe persistent asthma, with acute exacerbation  [  ]  Severe persistent asthma, with status asthmaticus      [ x ]  Other asthma type (please specify): persistent astham with unknown severity presenting with status asthmaticus _______________________________________________    [  ]  Clinically Undetermined    Please document in your progress notes daily for the duration of treatment until resolved and include in your discharge summary.

## 2018-09-06 NOTE — PLAN OF CARE
Problem: Patient Care Overview  Goal: Plan of Care Review  Outcome: Ongoing (interventions implemented as appropriate)  Continue with respiratory treatments. Ambulating around unit. Coughs frequently- deep productive cough. No c/o pain or discomfort.

## 2018-09-07 NOTE — NURSING
Discharge teaching done with patient and family. Verbalizes understanding. Paper prescriptions given. PIV discontinued. Refused wheelchair transport. Ambulated off unit with family. NAD.

## 2018-09-10 ENCOUNTER — PATIENT OUTREACH (OUTPATIENT)
Dept: ADMINISTRATIVE | Facility: CLINIC | Age: 48
End: 2018-09-10

## 2018-09-10 ENCOUNTER — OFFICE VISIT (OUTPATIENT)
Dept: FAMILY MEDICINE | Facility: CLINIC | Age: 48
End: 2018-09-10
Payer: MEDICARE

## 2018-09-10 ENCOUNTER — TELEPHONE (OUTPATIENT)
Dept: OBSTETRICS AND GYNECOLOGY | Facility: CLINIC | Age: 48
End: 2018-09-10

## 2018-09-10 VITALS
TEMPERATURE: 98 F | SYSTOLIC BLOOD PRESSURE: 157 MMHG | WEIGHT: 293 LBS | OXYGEN SATURATION: 95 % | HEART RATE: 66 BPM | BODY MASS INDEX: 50.02 KG/M2 | HEIGHT: 64 IN | RESPIRATION RATE: 16 BRPM | DIASTOLIC BLOOD PRESSURE: 92 MMHG

## 2018-09-10 DIAGNOSIS — I10 ESSENTIAL HYPERTENSION: Chronic | ICD-10-CM

## 2018-09-10 DIAGNOSIS — F17.200 TOBACCO USE DISORDER, MODERATE, DEPENDENCE: ICD-10-CM

## 2018-09-10 DIAGNOSIS — F11.20 UNCOMPLICATED OPIOID DEPENDENCE: ICD-10-CM

## 2018-09-10 DIAGNOSIS — F33.3 SEVERE EPISODE OF RECURRENT MAJOR DEPRESSIVE DISORDER, WITH PSYCHOTIC FEATURES: ICD-10-CM

## 2018-09-10 DIAGNOSIS — Z23 IMMUNIZATION DUE: ICD-10-CM

## 2018-09-10 DIAGNOSIS — N93.8 DUB (DYSFUNCTIONAL UTERINE BLEEDING): Primary | ICD-10-CM

## 2018-09-10 DIAGNOSIS — E66.01 MORBID OBESITY WITH BMI OF 60.0-69.9, ADULT: Chronic | ICD-10-CM

## 2018-09-10 DIAGNOSIS — J45.50 SEVERE PERSISTENT ASTHMA, UNSPECIFIED WHETHER COMPLICATED: Primary | ICD-10-CM

## 2018-09-10 PROCEDURE — 99499 UNLISTED E&M SERVICE: CPT | Mod: S$GLB,,, | Performed by: FAMILY MEDICINE

## 2018-09-10 PROCEDURE — 90686 IIV4 VACC NO PRSV 0.5 ML IM: CPT | Mod: PBBFAC,PN

## 2018-09-10 PROCEDURE — 99496 TRANSJ CARE MGMT HIGH F2F 7D: CPT | Mod: PBBFAC,PN | Performed by: FAMILY MEDICINE

## 2018-09-10 PROCEDURE — 99214 OFFICE O/P EST MOD 30 MIN: CPT | Mod: PBBFAC,PN | Performed by: FAMILY MEDICINE

## 2018-09-10 PROCEDURE — 99496 TRANSJ CARE MGMT HIGH F2F 7D: CPT | Mod: S$PBB,,, | Performed by: FAMILY MEDICINE

## 2018-09-10 PROCEDURE — 99999 PR PBB SHADOW E&M-EST. PATIENT-LVL IV: CPT | Mod: PBBFAC,,, | Performed by: FAMILY MEDICINE

## 2018-09-10 RX ORDER — TIOTROPIUM BROMIDE 18 UG/1
18 CAPSULE ORAL; RESPIRATORY (INHALATION) DAILY
Qty: 30 CAPSULE | Refills: 11 | Status: SHIPPED | OUTPATIENT
Start: 2018-09-10 | End: 2019-11-15

## 2018-09-10 RX ORDER — QUETIAPINE FUMARATE 25 MG/1
25 TABLET, FILM COATED ORAL NIGHTLY
Qty: 30 TABLET | Refills: 0 | Status: SHIPPED | OUTPATIENT
Start: 2018-09-10 | End: 2018-09-10 | Stop reason: SDUPTHER

## 2018-09-10 RX ORDER — BUDESONIDE AND FORMOTEROL FUMARATE DIHYDRATE 160; 4.5 UG/1; UG/1
2 AEROSOL RESPIRATORY (INHALATION) EVERY 12 HOURS
Qty: 1 INHALER | Refills: 5 | Status: SHIPPED | OUTPATIENT
Start: 2018-09-10 | End: 2019-03-22 | Stop reason: SDUPTHER

## 2018-09-10 RX ORDER — ALBUTEROL SULFATE 90 UG/1
2 AEROSOL, METERED RESPIRATORY (INHALATION) EVERY 6 HOURS PRN
Qty: 1 INHALER | Refills: 0 | Status: SHIPPED | OUTPATIENT
Start: 2018-09-10 | End: 2018-09-10 | Stop reason: SDUPTHER

## 2018-09-10 RX ORDER — ALBUTEROL SULFATE 90 UG/1
2 AEROSOL, METERED RESPIRATORY (INHALATION) EVERY 6 HOURS PRN
Qty: 1 INHALER | Refills: 0 | Status: SHIPPED | OUTPATIENT
Start: 2018-09-10 | End: 2019-03-22 | Stop reason: SDUPTHER

## 2018-09-10 RX ORDER — QUETIAPINE FUMARATE 25 MG/1
TABLET, FILM COATED ORAL
Qty: 90 TABLET | Refills: 0 | Status: SHIPPED | OUTPATIENT
Start: 2018-09-10 | End: 2018-12-08 | Stop reason: SDUPTHER

## 2018-09-10 NOTE — PATIENT INSTRUCTIONS
Care for COPD  You have been diagnosed with chronic obstructive pulmonary disease (COPD). This is a name given to a group of diseases that limit the flow of air in and out of your lungs, making it harder to breathe. With COPD, you are also more likely to get lung infections. COPD includes chronic bronchitis and emphysema, which are most often caused by heavy, long-term cigarette smoking.  Home Care  Break the smoking habit.  Enroll in a stop-smoking program to increase your chances of success.  Ask your doctor about medications or other methods to help you quit.  Ask family members to quit smoking as well.  Don't allow people to smoke in your home or when they are around you.  Protect yourself from infection.  Wash your hands often. Do your best to keep your hands away from your face. Most germs are spread from your hands to your mouth.  Get a flu shot every year. Ask your doctor about a pneumonia vaccination.  Avoid crowds. It's especially important to do this in the winter when more people have colds and flu.  Get enough sleep, exercise regularly, and eat a balanced diet.  Learn postural drainage and percussion, techniques that can help you cough up extra mucus. This extra mucus can trap germs in your lungs.  Take your medications exactly as directed. Don't skip doses.  Stress can make COPD worse. Use these stress-management techniques:  Find a quiet place and sit or lie in a comfortable position.  Close your eyes and perform breathing exercises for several minutes.  Follow-Up  Make a follow-up appointment as directed by our staff.    When to Call Your Doctor  Call your doctor immediately if you have any of the following:  Shortness of breath, wheezing, or coughing  Increased mucus  Yellow, green, bloody, or smelly mucus  Fever or chills  Tightness in your chest that does not go away with rest or medication  An irregular heartbeat  Swollen ankles                                     © [id number] [address]  [place] [address]. All rights reserved. This information is not intended as a substitute for professional medical care. Always follow your healthcare professional's instructions.

## 2018-09-10 NOTE — PROGRESS NOTES
Transitional Care Note  Subjective:       Patient ID: Yanni Kaiser is a 48 y.o. female.  Chief Complaint: hospital followup (asthma) and Chest Pain (comes and goes. sometimes dull and sometimes sharp )    Family and/or Caretaker present at visit?  No.  Diagnostic tests reviewed/disposition: No diagnosic tests pending after this hospitalization.  Disease/illness education: Chronic asthma  Home health/community services discussion/referrals: Patient does not have home health established from hospital visit.  They do not need home health.  If needed, we will set up home health for the patient.   Establishment or re-establishment of referral orders for community resources: No other necessary community resources.   Discussion with other health care providers: No discussion with other health care providers necessary.   Patient presenting today after being admitted to ICU for asthma exacerbation.  Since discharge she reports not smoking at all.  She has been using her patches and is wearing one today.  She has not used any inhalers stating none were prescribed at the time of discharge.  She states that she feels much better.  She did get short of breath yesterday after getting overheated at home.  She states that she used her rescue inhaler and felt better.  She has been coughing up phlegm (no blood) since being in the hospital. No fevers, chills, or sweats.  She is scheduled to see smoking cessation Wednesday.        Review of Systems   Constitutional: Negative for fever.   HENT: Negative for congestion, rhinorrhea and trouble swallowing.    Eyes: Negative for discharge.   Respiratory: Positive for cough. Negative for choking, chest tightness, shortness of breath and wheezing.    Cardiovascular: Negative for chest pain and leg swelling.   Gastrointestinal: Negative for constipation, diarrhea, nausea and vomiting.   Musculoskeletal: Positive for arthralgias and joint swelling.   Neurological: Negative for  dizziness, facial asymmetry and headaches.       Objective:      Physical Exam   Constitutional: She is oriented to person, place, and time.   HENT:   Head: Normocephalic and atraumatic.   Mouth/Throat: Oropharynx is clear and moist.   Eyes: Conjunctivae and EOM are normal. Pupils are equal, round, and reactive to light.   Neck: Normal range of motion. Neck supple.   Cardiovascular: Normal rate, regular rhythm and normal heart sounds.   Pulmonary/Chest: No stridor. No respiratory distress. She has wheezes. She exhibits no tenderness.   Abdominal: Soft. Bowel sounds are normal.   Neurological: She is alert and oriented to person, place, and time.   Skin: Skin is warm and dry.       Assessment:       1. Severe persistent asthma, unspecified whether complicated    2. Morbid obesity with BMI of 60.0-69.9, adult    3. Tobacco use disorder, moderate, dependence    4. Uncomplicated opioid dependence    5. Essential hypertension    6. Immunization due    7. Severe episode of recurrent major depressive disorder, with psychotic features        Plan:       1.  Inhalers refilled  2.  Flu vaccine given  3.  Monitor blood sugars as directed and notify me if they are not improving by Friday  4.  Take all medications as prescribed  5.  Attend smoking cessation classes ass cheduled  6.  Encouraged weight loss  7.  Refilled Seroquel per her request.  She denies any SI/HI or AH/VH at this time      Carlyle Gordillo MD

## 2018-09-12 ENCOUNTER — CLINICAL SUPPORT (OUTPATIENT)
Dept: SMOKING CESSATION | Facility: CLINIC | Age: 48
End: 2018-09-12
Payer: COMMERCIAL

## 2018-09-12 DIAGNOSIS — F17.200 NICOTINE DEPENDENCE: Primary | ICD-10-CM

## 2018-09-12 PROCEDURE — 99404 PREV MED CNSL INDIV APPRX 60: CPT | Mod: S$GLB,,,

## 2018-09-12 PROCEDURE — 99999 PR PBB SHADOW E&M-EST. PATIENT-LVL I: CPT | Mod: PBBFAC,,,

## 2018-09-13 ENCOUNTER — TELEPHONE (OUTPATIENT)
Dept: FAMILY MEDICINE | Facility: CLINIC | Age: 48
End: 2018-09-13

## 2018-09-15 NOTE — PROGRESS NOTES
Patient will be participating in biweekly tobacco cessation meetings and will continue the prescribed tobacco cessation medication regime of  21 mg nicotine patch QD .  She is currently tobacco free she used patches while she was in the hospital. . Pt's exhaled carbon monoxide level was  0 ppm as per Smokerlyzer. (non- smoker = 0-5 ppm.) Will see pt back in office in 2 weeks.

## 2018-09-18 ENCOUNTER — OFFICE VISIT (OUTPATIENT)
Dept: PODIATRY | Facility: CLINIC | Age: 48
End: 2018-09-18
Payer: MEDICARE

## 2018-09-18 VITALS
DIASTOLIC BLOOD PRESSURE: 76 MMHG | BODY MASS INDEX: 50.02 KG/M2 | SYSTOLIC BLOOD PRESSURE: 132 MMHG | WEIGHT: 293 LBS | HEIGHT: 64 IN

## 2018-09-18 DIAGNOSIS — M79.673 CHRONIC HEEL PAIN, UNSPECIFIED LATERALITY: ICD-10-CM

## 2018-09-18 DIAGNOSIS — M72.2 PLANTAR FASCIITIS: Primary | ICD-10-CM

## 2018-09-18 DIAGNOSIS — E11.49 TYPE II DIABETES MELLITUS WITH NEUROLOGICAL MANIFESTATIONS: ICD-10-CM

## 2018-09-18 DIAGNOSIS — G89.29 CHRONIC HEEL PAIN, UNSPECIFIED LATERALITY: ICD-10-CM

## 2018-09-18 PROCEDURE — 3044F HG A1C LEVEL LT 7.0%: CPT | Mod: CPTII,,, | Performed by: PODIATRIST

## 2018-09-18 PROCEDURE — 3075F SYST BP GE 130 - 139MM HG: CPT | Mod: CPTII,,, | Performed by: PODIATRIST

## 2018-09-18 PROCEDURE — 99213 OFFICE O/P EST LOW 20 MIN: CPT | Mod: S$PBB,,, | Performed by: PODIATRIST

## 2018-09-18 PROCEDURE — 3078F DIAST BP <80 MM HG: CPT | Mod: CPTII,,, | Performed by: PODIATRIST

## 2018-09-18 PROCEDURE — 99215 OFFICE O/P EST HI 40 MIN: CPT | Mod: PBBFAC,PO | Performed by: PODIATRIST

## 2018-09-18 PROCEDURE — 99999 PR PBB SHADOW E&M-EST. PATIENT-LVL V: CPT | Mod: PBBFAC,,, | Performed by: PODIATRIST

## 2018-09-18 PROCEDURE — 3008F BODY MASS INDEX DOCD: CPT | Mod: CPTII,,, | Performed by: PODIATRIST

## 2018-09-19 ENCOUNTER — CLINICAL SUPPORT (OUTPATIENT)
Dept: SMOKING CESSATION | Facility: CLINIC | Age: 48
End: 2018-09-19
Payer: COMMERCIAL

## 2018-09-19 DIAGNOSIS — F17.200 NICOTINE DEPENDENCE: Primary | ICD-10-CM

## 2018-09-19 PROCEDURE — 99999 PR PBB SHADOW E&M-EST. PATIENT-LVL I: CPT | Mod: PBBFAC,,,

## 2018-09-19 PROCEDURE — 99403 PREV MED CNSL INDIV APPRX 45: CPT | Mod: S$GLB,,,

## 2018-09-19 RX ORDER — DM/P-EPHED/ACETAMINOPH/DOXYLAM 30-7.5/3
2 LIQUID (ML) ORAL
Qty: 168 LOZENGE | Refills: 0 | Status: SHIPPED | OUTPATIENT
Start: 2018-09-19 | End: 2019-08-19

## 2018-09-19 RX ORDER — DM/P-EPHED/ACETAMINOPH/DOXYLAM 30-7.5/3
2 LIQUID (ML) ORAL
Qty: 168 LOZENGE | Refills: 0 | Status: SHIPPED | OUTPATIENT
Start: 2018-09-19 | End: 2018-09-19

## 2018-09-19 NOTE — Clinical Note
Patient started smoking again, she also has not worn her patch since Sunday . Pt continues to smoke 10 cigs/day. Patient contributed her smoking again to stress and anxiety.  Reviewed with patient diaphragmatic breathing and encouraged her to practice whenever she feels anxious or wants a cigarette. She agrees to start wearing her patches again and also added 2 mg nicotine lozenge to use in place of a cigarette for breakthrough crave. Reviewed coping strategies/habitual behavior/relapse prevention with patient. Exhaled carbon monoxide level was 5 ppm per Smokerlyzer  ( non- smoker = 0-5 ppm .)  Will see pt back in office next week.

## 2018-09-19 NOTE — PROGRESS NOTES
Individual Follow-Up Form    9/19/2018    Quit Date:     Clinical Status of Patient: Outpatient    Length of Service: 45 minutes    Continuing Medication: no    Other Medications:      Target Symptoms: Withdrawal and medication side effects. The following were rated moderate (3) to severe (4) on TCRS:  · Moderate (3): irritable, crave  · Severe (4): none     Comments: Patient started smoking again, she also has not worn her patch since Sunday . Pt continues to smoke 10 cigs/day. Patient contributed her smoking again to stress and anxiety.  Reviewed with patient diaphragmatic breathing and encouraged her to practice whenever she feels anxious or wants a cigarette. She agrees to start wearing her patches again and also added 2 mg nicotine lozenge to use in place of a cigarette for breakthrough crave. Reviewed coping strategies/habitual behavior/relapse prevention with patient. Exhaled carbon monoxide level was 5 ppm per Smokerlyzer  ( non- smoker = 0-5 ppm .)  Will see pt back in office next week.       Diagnosis: F17.200    Next Visit: 1 week

## 2018-09-20 NOTE — PROGRESS NOTES
Subjective:      Patient ID: Yanni Kaiser is a 48 y.o. female.    Chief Complaint: Foot Pain (bilateral foot pain/pcp Dr Gordillo) and Foot Problem    Yanni Kaiser is a 48 y.o. femalewho presents to the clinic for evaluation and treatment of high risk feet. Yanni Kaiser   has a past medical history of Allergy, Anemia, Asthma, Depression, Diabetes mellitus, type 2, Diabetes with neurologic complications, GERD (gastroesophageal reflux disease), High cholesterol, Hyperprolactinemia, Hypertension, Pseudotumor cerebri, Seizures, Sleep apnea, Type 2 diabetes mellitus, and Type 2 diabetes mellitus, controlled (9/3/2014).   The patient's chief complaint is the feeling of swelling to the feet and continuedright heel pain.     8/8/18: Presents for worsening LEFT heel pain. Reports pain to right heel has improved. Currently on percocet prescribed by pain management.     9/20/18: Reports pain is 20% improved to left heel. States she has started wearing tennis shoes. S/p injection LEFT heel. Reports pain to right heel well controlled.     PCP: Carlyle Gordillo MD    Date Last Seen by PCP:   Chief Complaint   Patient presents with    Foot Pain     bilateral foot pain/pcp Dr Gordillo    Foot Problem       Current shoe gear:   Grapeword boat shoes with inserts     This patient has documented high risk feet requiring routine maintenance secondary to diabetes mellitis and those secondary complications of diabetes, as mentioned..      Hemoglobin A1C   Date Value Ref Range Status   07/30/2018 5.8 (H) 4.0 - 5.6 % Final     Comment:     ADA Screening Guidelines:  5.7-6.4%  Consistent with prediabetes  >or=6.5%  Consistent with diabetes  High levels of fetal hemoglobin interfere with the HbA1C  assay. Heterozygous hemoglobin variants (HbS, HgC, etc)do  not significantly interfere with this assay.   However, presence of multiple variants may affect accuracy.     01/22/2018 5.8 (H) 4.0 - 5.6 % Final     Comment:      According to ADA guidelines, hemoglobin A1c <7.0% represents  optimal control in non-pregnant diabetic patients. Different  metrics may apply to specific patient populations.   Standards of Medical Care in Diabetes-2016.  For the purpose of screening for the presence of diabetes:  <5.7%     Consistent with the absence of diabetes  5.7-6.4%  Consistent with increasing risk for diabetes   (prediabetes)  >or=6.5%  Consistent with diabetes  Currently, no consensus exists for use of hemoglobin A1c  for diagnosis of diabetes for children.  This Hemoglobin A1c assay has significant interference with fetal   hemoglobin   (HbF). The results are invalid for patients with abnormal amounts of   HbF,   including those with known Hereditary Persistence   of Fetal Hemoglobin. Heterozygous hemoglobin variants (HbAS, HbAC,   HbAD, HbAE, HbA2) do not significantly interfere with this assay;   however, presence of multiple variants in a sample may impact the %   interference.     06/26/2017 6.0 (H) 4.0 - 5.6 % Final     Comment:     According to ADA guidelines, hemoglobin A1c <7.0% represents  optimal control in non-pregnant diabetic patients. Different  metrics may apply to specific patient populations.   Standards of Medical Care in Diabetes-2016.  For the purpose of screening for the presence of diabetes:  <5.7%     Consistent with the absence of diabetes  5.7-6.4%  Consistent with increasing risk for diabetes   (prediabetes)  >or=6.5%  Consistent with diabetes  Currently, no consensus exists for use of hemoglobin A1c  for diagnosis of diabetes for children.  This Hemoglobin A1c assay has significant interference with fetal   hemoglobin   (HbF). The results are invalid for patients with abnormal amounts of   HbF,   including those with known Hereditary Persistence   of Fetal Hemoglobin. Heterozygous hemoglobin variants (HbAS, HbAC,   HbAD, HbAE, HbA2) do not significantly interfere with this assay;   however, presence of multiple  variants in a sample may impact the %   interference.       Patient Active Problem List   Diagnosis    Mild intermittent asthma    SHARATH (obstructive sleep apnea)    Leg varices    Low back pain    Migraine    Morbid obesity with BMI of 60.0-69.9, adult    Tobacco use disorder, moderate, dependence    Exacerbation of asthma    Anemia of chronic disease    Mild protein malnutrition    Weakness    Allergic rhinitis    Idiopathic intracranial hypertension    Essential hypertension    Combined hyperlipidemia associated with type 2 diabetes mellitus    Depression    Hyperlipidemia    GERD (gastroesophageal reflux disease)    Epilepsy    Plantar fasciitis, bilateral    Numbness of right hand    Type 2 diabetes mellitus with stage 3 chronic kidney disease, with long-term current use of insulin    Recurrent major depressive disorder, in full remission    Simple chronic bronchitis    Other chronic pain    Suicide attempt    Asthma with severe exacerbation    Opioid dependence    Lumbar stenosis    Chest pain     Current Outpatient Medications on File Prior to Visit   Medication Sig Dispense Refill    acetaZOLAMIDE (DIAMOX) 500 mg CpSR Take 1 capsule (500 mg total) by mouth 2 (two) times daily. 360 capsule 1    albuterol 90 mcg/actuation inhaler Inhale 2 puffs into the lungs every 6 (six) hours as needed for Wheezing. Rescue 1 Inhaler 0    albuterol-ipratropium (DUO-NEB) 2.5 mg-0.5 mg/3 mL nebulizer solution Take 3 mLs by nebulization every 6 (six) hours as needed for Wheezing or Shortness of Breath. Rescue 100 mL 3    blood sugar diagnostic Strp 1 each by Misc.(Non-Drug; Combo Route) route 4 (four) times daily before meals and nightly. ACCU CHEK ELVIA PLUS METER 200 each 3    blood-glucose meter (ACCU-CHEK ELVIA PLUS METER) Misc TEST FOUR TIMES DAILY BEFORE MEALS AND EVERY NIGHT 90 each 1    budesonide-formoterol 160-4.5 mcg (SYMBICORT) 160-4.5 mcg/actuation HFAA Inhale 2 puffs into the  lungs every 12 (twelve) hours. Controller 1 Inhaler 5    capsaicin 0.1 % Crea Apply 1 application topically 2 (two) times daily. 56.6 g 1    cetirizine (ZYRTEC) 10 MG tablet Take 1 tablet (10 mg total) by mouth 2 (two) times daily. 60 tablet 3    hydroCHLOROthiazide (HYDRODIURIL) 25 MG tablet Take 1 tablet (25 mg total) by mouth once daily. 30 tablet 11    hydrOXYzine (ATARAX) 50 MG tablet Take 1-4 at night for itching. 120 tablet 3    insulin detemir U-100 (LEVEMIR FLEXTOUCH) 100 unit/mL (3 mL) SubQ InPn pen Inject 5 Units into the skin every evening. 1.5 mL 11    lancets (ACCU-CHEK SOFTCLIX LANCETS) Misc 1 Device by Misc.(Non-Drug; Combo Route) route 4 (four) times daily. 200 each 3    LINZESS 290 mcg Cap TK ONE CAPSULE PO D ON AN EMPTY STOMACH  5    losartan (COZAAR) 100 MG tablet TAKE 1 TABLET BY MOUTH EVERY DAY 90 tablet 0    meloxicam (MOBIC) 15 MG tablet TAKE 1 TABLET(15 MG) BY MOUTH DAILY AS NEEDED FOR HEADACHE 90 tablet 1    metformin (GLUCOPHAGE-XR) 500 MG 24 hr tablet Take 2 tablets (1,000 mg total) by mouth 2 (two) times daily with meals. 360 tablet 0    mometasone (NASONEX) 50 mcg/actuation nasal spray 2 sprays by Nasal route once daily. 1 each 11    mometasone 0.1% (ELOCON) 0.1 % cream       nicotine (NICODERM CQ) 21 mg/24 hr Place 1 patch onto the skin once daily. 30 patch 0    oxyCODONE-acetaminophen (PERCOCET)  mg per tablet 1 tablet every 6 (six) hours as needed.       QUEtiapine (SEROQUEL) 25 MG Tab TAKE 1 TABLET(25 MG) BY MOUTH EVERY EVENING 90 tablet 0    sumatriptan (IMITREX) 50 MG tablet Take 1 tab at onset of headaches.  If no improvement in 2 hours, take another.  Do not take more than 2 tabs in 24 hours. 9 tablet 5    tiotropium (SPIRIVA) 18 mcg inhalation capsule Inhale 1 capsule (18 mcg total) into the lungs once daily. Controller 30 capsule 11    tiZANidine (ZANAFLEX) 4 MG tablet Take 4 mg by mouth every 8 (eight) hours.       topiramate (TOPAMAX) 100 MG tablet  Take 3 tablets (300 mg total) by mouth 2 (two) times daily. 180 tablet 5    venlafaxine (EFFEXOR-XR) 75 MG 24 hr capsule Take 1 capsule (75 mg total) by mouth every evening. 90 capsule 1     No current facility-administered medications on file prior to visit.      Review of patient's allergies indicates:  No Known Allergies  Past Surgical History:   Procedure Laterality Date    BREAST CYST ASPIRATION       SECTION      X 1    CHOLECYSTECTOMY      SINUS SURGERY       Family History   Problem Relation Age of Onset    Cancer Mother     Hypertension Mother     Diabetes Mother     Stroke Father     Heart disease Father     COPD Father     Heart attack Father     Cancer Maternal Grandmother     Breast cancer Maternal Grandmother      Social History     Socioeconomic History    Marital status:      Spouse name: Not on file    Number of children: Not on file    Years of education: Not on file    Highest education level: Not on file   Social Needs    Financial resource strain: Not on file    Food insecurity - worry: Not on file    Food insecurity - inability: Not on file    Transportation needs - medical: Not on file    Transportation needs - non-medical: Not on file   Occupational History    Not on file   Tobacco Use    Smoking status: Current Every Day Smoker     Packs/day: 1.00     Years: 15.00     Pack years: 15.00     Types: Cigarettes    Smokeless tobacco: Never Used   Substance and Sexual Activity    Alcohol use: No     Alcohol/week: 0.0 oz    Drug use: No    Sexual activity: Yes     Partners: Male     Birth control/protection: None   Other Topics Concern    Not on file   Social History Narrative    Not on file        Review of Systems   Constitution: Negative for chills, fever and weakness.   Cardiovascular: Positive for leg swelling. Negative for chest pain and claudication.   Respiratory: Negative for cough and shortness of breath.    Skin: Positive for dry skin and  "nail changes. Negative for itching and rash.   Musculoskeletal: Positive for arthritis, joint pain, muscle cramps and myalgias. Negative for falls, joint swelling and muscle weakness.   Gastrointestinal: Negative for diarrhea, nausea and vomiting.   Neurological: Positive for numbness and paresthesias. Negative for tremors.   Psychiatric/Behavioral: Negative for altered mental status and hallucinations.           Objective:       Vitals:    09/18/18 1514   BP: 132/76   Weight: (!) 165.1 kg (364 lb)   Height: 5' 4" (1.626 m)   PainSc:   6       Physical Exam   Constitutional:   General: Pt. is well-developed, well-nourished, appears stated age, in no acute distress, alert and oriented x 3. No evidence of depression, anxiety, or agitation. Calm, cooperative, and communicative. Appropriate interactions and affect.       Cardiovascular:   Pulses:       Dorsalis pedis pulses are 1+ on the right side, and 1+ on the left side.        Posterior tibial pulses are 1+ on the right side, and 1+ on the left side.   Dorsalis pedis and posterior tibial pulses are diminished Bilaterally. Toes are cool to touch. Feet are warm proximally.There is decreased digital hair. Skin is atrophic, hyperpigmented, and mildly edematous       Musculoskeletal:        Right ankle: She exhibits decreased range of motion and swelling. No tenderness. No lateral malleolus, no medial malleolus, no CF ligament, no head of 5th metatarsal and no proximal fibula tenderness found. Achilles tendon exhibits no pain and no defect.        Left ankle: She exhibits decreased range of motion and swelling. No tenderness. No lateral malleolus, no medial malleolus, no head of 5th metatarsal and no proximal fibula tenderness found. Achilles tendon exhibits no pain and no defect.        Right foot: There is tenderness (Plantar medial calcaneal tuber. mild pain to palpation of 5th met hase and resistance to eversion). There is normal range of motion.        Left foot: " There is tenderness (plantar calcaneal medial tuber. mild pain with palpation of 5th met base and eversion resistance. ). There is normal range of motion.   Biomechanical exam: Pain on palpation bilateral medial calcaneal tubercle at origin of plantar fascia. L>R,  There is equinus deformity bilateral with decreased dorsiflexion at the ankle joint bilateral. No tenderness with compression of heel. Negative tinels sign.     Muscle strength is 5/5 in all groups bilaterally.    Decreased stride, station of gait.  apropulsive toe off.  Increased angle and base of gait.    Patient has hammertoes of digits 2-5 bilateral partially reducible without symptom today.    Decreased first MPJ range of motion both weightbearing and nonweightbearing, no crepitus observed the first MP joint,+ dorsal flag sign. Mild  bunion deformity is observed .    Decreased arch height noted b/l.    Neurological: A sensory deficit is present.   [place]jun 5.07 monofilamant testing is diminished Maciej feet. Sharp/dull sensation diminished Bilaterally.     Paresthesias, and hyperesthesia bilateral feet with no clearly identified trigger or source.     Skin: Skin is warm, dry and intact. No abrasion, no ecchymosis, no lesion and no rash noted. She is not diaphoretic. No cyanosis or erythema. No pallor. Nails show no clubbing.   Toenails 1-5 bilaterally neatly trimmed and painted thickened by 2-3 mm,     Interdigital Spaces clean, dry and without evidence of break in skin integrity    Small < 1cm fissure noted to plantar medial R heel. Stable without signs of infection. No erythema or drainage noted.    Psychiatric: She has a normal mood and affect. Her speech is normal.   Nursing note and vitals reviewed.            Assessment:       Encounter Diagnoses   Name Primary?    Plantar fasciitis Yes    Type II diabetes mellitus with neurological manifestations     Chronic heel pain, unspecified laterality          Plan:       Yanni was seen today  for foot pain and foot problem.    Diagnoses and all orders for this visit:    Plantar fasciitis  -     Ambulatory Referral to Physical/Occupational Therapy    Type II diabetes mellitus with neurological manifestations    Chronic heel pain, unspecified laterality      I counseled the patient on her conditions, their implications and medical management.     Pain to Left heel improved.     - Shoe inspection. Diabetic Foot Education. Patient reminded of the importance of good nutrition and blood sugar control to help prevent podiatric complications of diabetes. Patient instructed on proper foot hygeine. We discussed wearing proper shoe gear, daily foot inspections, never walking without protective shoe gear, never putting sharp instruments to feet.     Discussed different treatment options for foot pain. I gave written and verbal instructions on stretching exercises. Patient expressed understanding. Discussed icing the affected area as needed and also wearing appropriate shoe gear and avoiding flats, slippers, sandals, and going barefoot. My recommendation for OTC supports is [employer]. Patient instructed on adequate icing techniques. Patient should ice the affected area at least once per day x 10 minutes for 10 days . I advised the patient that extra icing would also be beneficial to ensure adequate anti inflammatory effect. We also discussed cortisone injections and NSAID therapy.       Referral placed to PT      RTC in 6 weeks. sooner PRSVETLANA Barnes DPM

## 2018-09-24 NOTE — PATIENT INSTRUCTIONS
.avs    Plantar Fasciitis  Plantar fasciitis is a painful swelling of the plantar fascia. The plantar fascia is a thick, fibrous layer of tissue. It covers the bones on the bottom of your foot. And it supports the foot bones in an arched position.  This can happen gradually or suddenly. It usually affects one foot at a time. Heel pain can be sharp, like a knife sticking into the bottom of your foot. You may feel pain after exercising, long-distance jogging, stair climbing, long periods of standing, or after standing up.  Risk factors include: non-active lifestyle, arthritis, diabetes, obesity or recent weight gain, flat foot, high arch. Wearing high heels, loose shoes, or shoes with poor arch support for long periods of time adds to the risk. This problem is commonly found in runners and dancers. It also found in people who stand on hard surfaces for long periods of time.  Foot pain from this condition is usually worse in the morning. But it improves with walking. By the end of the day there may be a dull aching. Treatment requires short-term rest and controlling swelling. It may take up to 9 months before all symptoms go away. Rarely, a steroid injection into the foot, or surgery, may be needed.  Home care  · If you are overweight, lose weight to help healing.  · Choose supportive shoes with good arch support and shock absorbency. Replace athletic shoes when they become worn out. Dont walk or run barefoot.  · Premade or custom-fitted shoe inserts may be helpful. Inserts made of silicone seem to be the most effective. Custom-made inserts can be provided by a podiatrist or foot specialist, physical therapist, or orthopedist.  · Premade or custom-made night splints keep the heel stretched out while you sleep. They may prevent morning pain.  · Avoid activities that stress the feet: jogging, prolonged standing or walking, contact sports, etc.  · First thing in the morning and before sports, stretch the bottom of your  feet. Gently flex your ankle so the toes move toward your knee.  · Icing may help control heel pain. Apply an ice pack to the heel for 10-20 minutes as a preventive. Or ice your heel after a severe flare-up of symptoms. You may repeat this every 1-2 hours as needed.  · You may use over-the-counter pain medicine to control pain, unless another medicine was prescribed. Anti-inflammatory pain medicines, such as ibuprofen or naproxen, may work better than acetaminophen. If you have chronic liver or kidney disease or ever had a stomach ulcer or GI bleeding, talk with your healthcare provider before using these medicines.  Follow-up care  Follow up with your healthcare provider, physical therapist, or podiatrist or foot specialist as advised.  Call for an appointment if pain worsens or there is no relief after a few weeks of home treatment. Shoe inserts, a night splint, or a special boot may be required.  If X-rays were taken, you will be told of any new findings that may affect your care.  When to seek medical advice  Call your healthcare provider right away if any of these occur:  · Foot swelling  · Redness with increasing pain  Date Last Reviewed: 11/21/2015  © 0166-1711 [employer]. [address] [place] [address], [place] [address]. All rights reserved. This information is not intended as a substitute for professional medical care. Always follow your healthcare professional's instructions.        Treating Plantar Fasciitis  First, your healthcare provider tries to determine the cause of your problem in order to suggest ways to relieve pain. If your pain is due to poor foot mechanics, custom-made shoe inserts (orthoses) may help.    Reduce symptoms  · To relieve mild symptoms, try aspirin, ibuprofen, or other medicines as directed. Rubbing ice on the affected area may also help.  · To reduce severe pain and swelling, your healthcare provider may prescribe pills or injections or a walking cast in some instances.  Physical therapy, such as ultrasound or a daily stretching program, may also be recommended. Surgery is rarely required.  · To reduce symptoms caused by poor foot mechanics, your foot may be taped. This supports the arch and temporarily controls movement. Night splints may also help by stretching the fascia.  Control movement  If taping helps, your healthcare provider may prescribe orthoses. Built from plaster casts of your feet, these inserts control the way your foot moves. As a result, your symptoms should go away.  Reduce overuse  Every time your foot strikes the ground, the plantar fascia is stretched. You can reduce the strain on the plantar fascia and the possibility of overuse by following these suggestions:  · Lose any excess weight.  · Avoid running on hard or uneven ground.  · Use orthoses at all times in your shoes and house slippers.  If surgery is needed  Your healthcare provider may consider surgery if other types of treatment don't control your pain. During surgery, the plantar fascia is partially cut to release tension. As you heal, fibrous tissue fills the space between the heel bone and the plantar fascia.   Date Last Reviewed: 10/14/2015  © 2164-2982 [employer]. [address] [place] [address]. All rights reserved. This information is not intended as a substitute for professional medical care. Always follow your healthcare professional's instructions.         Patient declined information

## 2018-09-25 ENCOUNTER — TELEPHONE (OUTPATIENT)
Dept: GASTROENTEROLOGY | Facility: CLINIC | Age: 48
End: 2018-09-25

## 2018-09-25 NOTE — DISCHARGE SUMMARY
[facility] [place] [facility]  Discharge Summary      Patient Name: Yanni Kaiser  MRN: [id number]  Admission Date: 9/3/2018  Hospital Length of Stay: 3 days  Discharge Date and Time: 9/6/2018  Attending Physician: No att. providers found   Discharging Provider: Annabelle Munguia MD  Primary Care Provider: Carlyle Gordillo MD      HPI:   47 yo female with SHARATH (not on CPAP), morbid obesity, tobacco use disorder, asthma, diabetes mellitus type 2, hypertension, narcotic dependence, chronic back pain (Lumbar spine stenosis), depression, migraine headaches, pseudotumor cerebri who presented with worsening shortness of breath of one day in evolution. Patient stated she was recently exposed to children who had cough and sore throat. She stated she developed sore throat and cough, which eventually progressed to wheezing and shortness of breath despite use of bronchodilators. Also reported left sided chest pain. No Hx of MI or respiratory failure requiring intubation. Still smokes. Not on CPAP at home due to intolerance to titration and did not follow up (at [facility]). Is chronically fatigued and sleepy during daytime. Is on percocet and muscle relaxants for chronic back pain. Denied rhinorrhea, rash, confusion, LOC, hemoptysis, nausea, vomiting, abdominal pain, diarrhea.     In the ED, patient was noted to be febrile to 101.1F, in distress, hypertensive and hypoxic to 89%. ABG with metabolic acidosis and mild acidemia with no respiratory compensation. Evidently wheezing. Placed on a mask (not sure if simple or non rebreather, but per chart review is definitely not BiPAP or CPAP), given solumedrol, continuous nebs, epinephrine 0.3 mg IM and Mag 2 mg IV. No EKG or troponin ordered in the ED. Patient showed some improvement after this. Patient was admitted to ICU for close observation.     * No surgery found *      Hospital Course:   Ms Kaiser presented with acute respiratory failure with hypoxia  secondary to asthma exacerbation. Likely exacerbated by viral upper respiratory tract infection. Also an active cigarette smoker, is morbidly obese and has SHARATH not treated with CPAP. Respiratory distress improved with supplemental O2, solumedrol, continuous nebs, epinephrine 0.3 mg IM and Mag 2 mg IV but still with plenty of residual wheezing. Admitted to ICU for close observation. Continued on scheduled duo-nebs q 4 hrs, solumedrol 40 mg TID and supplemental O2 via low flow NC, and left on bed rest status. Weaned off NC to room air. Still wheezing but overall felt better and able to ambulate short distances. Is very motivated to quit smoking and will enroll in smoking cessation program (she already contacted them to make an appointment). Steroids de-escalated to prednisone 20 mg daily (40 mg would make BG too high), ICS/LABA and changed duo-nebs to PRN. Stepped down to floor on 9/5.     9/6- pt did well with weaning of steroids and ok for dc home; pt given prescription for nebulizer solution and increased insulin regimen to basal-bolus insulin with oral agents; needs close follow up PCP and pulmonology; Counseled on smoking cessation.      Consults:     No new Assessment & Plan notes have been filed under this hospital service since the last note was generated.  Service: Hospital Medicine    Final Active Diagnoses:    Diagnosis Date Noted POA    Asthma with severe exacerbation [J45.901] 09/04/2018 Yes    Opioid dependence [F11.20] 09/04/2018 Yes    Lumbar stenosis [M48.061] 09/04/2018 Yes    Chest pain [R07.9] 09/04/2018 Yes    Type 2 diabetes mellitus with stage 3 chronic kidney disease, with long-term current use of insulin [E11.22, N18.3, Z79.4] 03/08/2017 Not Applicable    GERD (gastroesophageal reflux disease) [K21.9] 07/30/2016 Yes     Chronic    Depression [F32.9] 04/29/2016 Yes    Combined hyperlipidemia associated with type 2 diabetes mellitus [E11.69, E78.2] 02/04/2016 Yes    Essential  hypertension [I10] 02/04/2016 Yes     Chronic    Idiopathic intracranial hypertension [G93.2] 02/04/2016 Yes     Chronic    Migraine [G43.909] 09/03/2014 Yes     Chronic    Tobacco use disorder, moderate, dependence [F17.200] 09/03/2014 Yes    Morbid obesity with BMI of 60.0-69.9, adult [E66.01, Z68.44] 09/03/2014 Not Applicable     Chronic    SHARATH (obstructive sleep apnea) [G47.33] 03/27/2014 Yes      Problems Resolved During this Admission:    Diagnosis Date Noted Date Resolved POA    PRINCIPAL PROBLEM:  Acute respiratory failure with hypoxia [J96.01] 09/04/2018 09/06/2018 Yes    Type 2 diabetes mellitus, controlled [E11.9] 09/03/2014 09/04/2018 Yes     Chronic       Discharged Condition: good    Disposition: Home or Self Care    Follow Up:  Follow-up Information     Carlyle Gordillo MD.    Specialty:  Family Medicine  Contact information:  [address] [place] [address]  [place]NIS [address] [phone]                 Patient Instructions:      Diet diabetic     Diet Cardiac     Notify your health care provider if you experience any of the following:  temperature >100.4     Notify your health care provider if you experience any of the following:  difficulty breathing or increased cough     Activity as tolerated           Pending Diagnostic Studies:     None         Medications:  Reconciled Home Medications:      Medication List      START taking these medications    insulin detemir U-100 100 unit/mL (3 mL) Inpn pen  Commonly known as:  LEVEMIR FLEXTOUCH  Inject 5 Units into the skin every evening.     nicotine 21 mg/24 hr  Commonly known as:  NICODERM CQ  Place 1 patch onto the skin once daily.        CHANGE how you take these medications    hydrOXYzine 50 MG tablet  Commonly known as:  ATARAX  Take 1-4 at night for itching.  What changed:  Another medication with the same name was removed. Continue taking this medication, and follow the directions you see here.     lancets Misc  Commonly known as:  ACCU-CHEK SOFTCLIX  LANCETS  1 Device by Misc.(Non-Drug; Combo Route) route 4 (four) times daily.  What changed:  Another medication with the same name was removed. Continue taking this medication, and follow the directions you see here.        CONTINUE taking these medications    acetaZOLAMIDE 500 mg Cpsr  Commonly known as:  DIAMOX  Take 1 capsule (500 mg total) by mouth 2 (two) times daily.     albuterol-ipratropium 2.5 mg-0.5 mg/3 mL nebulizer solution  Commonly known as:  DUO-NEB  Take 3 mLs by nebulization every 6 (six) hours as needed for Wheezing or Shortness of Breath. Rescue     blood sugar diagnostic Strp  1 each by Misc.(Non-Drug; Combo Route) route 4 (four) times daily before meals and nightly. ACCU CHEK ELVIA PLUS METER     blood-glucose meter Misc  Commonly known as:  ACCU-CHEK ELVIA PLUS METER  TEST FOUR TIMES DAILY BEFORE MEALS AND EVERY NIGHT     capsaicin 0.1 % Crea  Apply 1 application topically 2 (two) times daily.     cetirizine 10 MG tablet  Commonly known as:  ZYRTEC  Take 1 tablet (10 mg total) by mouth 2 (two) times daily.     hydroCHLOROthiazide 25 MG tablet  Commonly known as:  HYDRODIURIL  Take 1 tablet (25 mg total) by mouth once daily.     LINZESS 290 mcg Cap  Generic drug:  linaclotide  TK ONE CAPSULE PO D ON AN EMPTY STOMACH     losartan 100 MG tablet  Commonly known as:  COZAAR  TAKE 1 TABLET BY MOUTH EVERY DAY     meloxicam 15 MG tablet  Commonly known as:  MOBIC  TAKE 1 TABLET(15 MG) BY MOUTH DAILY AS NEEDED FOR HEADACHE     metFORMIN 500 MG 24 hr tablet  Commonly known as:  GLUCOPHAGE-XR  Take 2 tablets (1,000 mg total) by mouth 2 (two) times daily with meals.     * mometasone 0.1% 0.1 % cream  Commonly known as:  ELOCON     * mometasone 50 mcg/actuation nasal spray  Commonly known as:  NASONEX  2 sprays by Nasal route once daily.     oxyCODONE-acetaminophen  mg per tablet  Commonly known as:  PERCOCET  1 tablet every 6 (six) hours as needed.     sumatriptan 50 MG tablet  Commonly known as:   IMITREX  Take 1 tab at onset of headaches.  If no improvement in 2 hours, take another.  Do not take more than 2 tabs in 24 hours.     tiZANidine 4 MG tablet  Commonly known as:  ZANAFLEX  Take 4 mg by mouth every 8 (eight) hours.     topiramate 100 MG tablet  Commonly known as:  TOPAMAX  Take 3 tablets (300 mg total) by mouth 2 (two) times daily.     venlafaxine 75 MG 24 hr capsule  Commonly known as:  EFFEXOR-XR  Take 1 capsule (75 mg total) by mouth every evening.         * This list has 2 medication(s) that are the same as other medications prescribed for you. Read the directions carefully, and ask your doctor or other care provider to review them with you.            STOP taking these medications    buPROPion 150 MG TB24 tablet  Commonly known as:  WELLBUTRIN XL     naproxen 500 MG tablet  Commonly known as:  NAPROSYN     NovoLOG Flexpen U-100 Insulin 100 unit/mL Inpn pen  Generic drug:  insulin aspart U-100     pantoprazole 40 MG tablet  Commonly known as:  PROTONIX     TAZTIA  MG Cs24  Generic drug:  diltiaZEM     tiotropium 18 mcg inhalation capsule  Commonly known as:  SPIRIVA            Indwelling Lines/Drains at time of discharge:   Lines/Drains/Airways          None          Time spent on the discharge of patient: 35minutes  Patient was seen and examined on the date of discharge and determined to be suitable for discharge.         Annabelle Munguia MD  Department of Hospital Medicine  [facility]

## 2018-09-25 NOTE — TELEPHONE ENCOUNTER
----- Message from Chelsey Mcnulty sent at 9/25/2018  3:23 PM CDT -----  Contact: [facility]- [phone]  Narayan Polanco called to check the status of the pts referral to see Dr. Sweeney- being referred by Dr. Trejo for gastroparesis- please contact Leanna at [phone]

## 2018-09-27 ENCOUNTER — CLINICAL SUPPORT (OUTPATIENT)
Dept: SMOKING CESSATION | Facility: CLINIC | Age: 48
End: 2018-09-27
Payer: COMMERCIAL

## 2018-09-27 DIAGNOSIS — F17.200 NICOTINE DEPENDENCE: Primary | ICD-10-CM

## 2018-09-27 PROCEDURE — 99407 BEHAV CHNG SMOKING > 10 MIN: CPT | Mod: S$GLB,,,

## 2018-10-03 RX ORDER — LOSARTAN POTASSIUM 100 MG/1
TABLET ORAL
Qty: 90 TABLET | Refills: 0 | Status: SHIPPED | OUTPATIENT
Start: 2018-10-03 | End: 2019-01-03 | Stop reason: SDUPTHER

## 2018-10-04 ENCOUNTER — CLINICAL SUPPORT (OUTPATIENT)
Dept: REHABILITATION | Facility: HOSPITAL | Age: 48
End: 2018-10-04
Attending: PODIATRIST
Payer: MEDICARE

## 2018-10-04 ENCOUNTER — CLINICAL SUPPORT (OUTPATIENT)
Dept: SMOKING CESSATION | Facility: CLINIC | Age: 48
End: 2018-10-04
Payer: COMMERCIAL

## 2018-10-04 DIAGNOSIS — M79.672 FOOT PAIN, BILATERAL: ICD-10-CM

## 2018-10-04 DIAGNOSIS — F17.200 NICOTINE DEPENDENCE: Primary | ICD-10-CM

## 2018-10-04 DIAGNOSIS — R26.89 ANTALGIC GAIT: ICD-10-CM

## 2018-10-04 DIAGNOSIS — M62.81 MUSCLE WEAKNESS: Primary | ICD-10-CM

## 2018-10-04 DIAGNOSIS — M79.671 FOOT PAIN, BILATERAL: ICD-10-CM

## 2018-10-04 DIAGNOSIS — M62.89 MUSCLE TIGHTNESS: ICD-10-CM

## 2018-10-04 PROCEDURE — 99999 PR PBB SHADOW E&M-EST. PATIENT-LVL I: CPT | Mod: PBBFAC,,,

## 2018-10-04 PROCEDURE — 99402 PREV MED CNSL INDIV APPRX 30: CPT | Mod: S$GLB,,,

## 2018-10-04 PROCEDURE — 97110 THERAPEUTIC EXERCISES: CPT | Mod: PN

## 2018-10-04 PROCEDURE — G8978 MOBILITY CURRENT STATUS: HCPCS | Mod: CL,PN

## 2018-10-04 PROCEDURE — G8979 MOBILITY GOAL STATUS: HCPCS | Mod: CK,PN

## 2018-10-04 PROCEDURE — 97161 PT EVAL LOW COMPLEX 20 MIN: CPT | Mod: PN

## 2018-10-04 NOTE — PLAN OF CARE
Physical Therapy Evaluation    Name: Yanni Kaiser  [facility] Number: [id number]    Diagnosis:   Encounter Diagnoses   Name Primary?    Muscle weakness Yes    Muscle tightness     Foot pain, bilateral     Antalgic gait      Physician: Laura Barnes DPM  Treatment Orders: PT Eval and Treat    Past Medical History:   Diagnosis Date    Allergy     Anemia     Asthma     Depression     Diabetes mellitus, type 2     Diabetes with neurologic complications     GERD (gastroesophageal reflux disease)     High cholesterol     Hyperprolactinemia     Hypertension     Pseudotumor cerebri     Seizures     Sleep apnea     Type 2 diabetes mellitus     Type 2 diabetes mellitus, controlled 9/3/2014     Current Outpatient Medications   Medication Sig    acetaZOLAMIDE (DIAMOX) 500 mg CpSR Take 1 capsule (500 mg total) by mouth 2 (two) times daily.    albuterol 90 mcg/actuation inhaler Inhale 2 puffs into the lungs every 6 (six) hours as needed for Wheezing. Rescue    albuterol-ipratropium (DUO-NEB) 2.5 mg-0.5 mg/3 mL nebulizer solution Take 3 mLs by nebulization every 6 (six) hours as needed for Wheezing or Shortness of Breath. Rescue    blood sugar diagnostic Strp 1 each by Misc.(Non-Drug; Combo Route) route 4 (four) times daily before meals and nightly. ACCU CHEK ELVIA PLUS METER    blood-glucose meter (ACCU-CHEK ELVIA PLUS METER) Misc TEST FOUR TIMES DAILY BEFORE MEALS AND EVERY NIGHT    budesonide-formoterol 160-4.5 mcg (SYMBICORT) 160-4.5 mcg/actuation HFAA Inhale 2 puffs into the lungs every 12 (twelve) hours. Controller    capsaicin 0.1 % Crea Apply 1 application topically 2 (two) times daily.    cetirizine (ZYRTEC) 10 MG tablet Take 1 tablet (10 mg total) by mouth 2 (two) times daily.    hydroCHLOROthiazide (HYDRODIURIL) 25 MG tablet Take 1 tablet (25 mg total) by mouth once daily.    hydrOXYzine (ATARAX) 50 MG tablet Take 1-4 at night for itching.    insulin detemir U-100 (LEVEMIR FLEXTOUCH)  100 unit/mL (3 mL) SubQ InPn pen Inject 5 Units into the skin every evening.    lancets (ACCU-CHEK SOFTCLIX LANCETS) Misc 1 Device by Misc.(Non-Drug; Combo Route) route 4 (four) times daily.    LINZESS 290 mcg Cap TK ONE CAPSULE PO D ON AN EMPTY STOMACH    losartan (COZAAR) 100 MG tablet TAKE 1 TABLET BY MOUTH EVERY DAY    meloxicam (MOBIC) 15 MG tablet TAKE 1 TABLET(15 MG) BY MOUTH DAILY AS NEEDED FOR HEADACHE    metformin (GLUCOPHAGE-XR) 500 MG 24 hr tablet Take 2 tablets (1,000 mg total) by mouth 2 (two) times daily with meals.    mometasone (NASONEX) 50 mcg/actuation nasal spray 2 sprays by Nasal route once daily.    mometasone 0.1% (ELOCON) 0.1 % cream     nicotine (NICODERM CQ) 21 mg/24 hr Place 1 patch onto the skin once daily.    nicotine polacrilex 2 MG Lozg Take 1 lozenge (2 mg total) by mouth as needed.    oxyCODONE-acetaminophen (PERCOCET)  mg per tablet 1 tablet every 6 (six) hours as needed.     QUEtiapine (SEROQUEL) 25 MG Tab TAKE 1 TABLET(25 MG) BY MOUTH EVERY EVENING    sumatriptan (IMITREX) 50 MG tablet Take 1 tab at onset of headaches.  If no improvement in 2 hours, take another.  Do not take more than 2 tabs in 24 hours.    tiotropium (SPIRIVA) 18 mcg inhalation capsule Inhale 1 capsule (18 mcg total) into the lungs once daily. Controller    tiZANidine (ZANAFLEX) 4 MG tablet Take 4 mg by mouth every 8 (eight) hours.     topiramate (TOPAMAX) 100 MG tablet Take 3 tablets (300 mg total) by mouth 2 (two) times daily.    venlafaxine (EFFEXOR-XR) 75 MG 24 hr capsule Take 1 capsule (75 mg total) by mouth every evening.     No current facility-administered medications for this visit.      Review of patient's allergies indicates:  No Known Allergies  Precautions: Standard     Evaluation Date: 10/4/2018   Visit # authorized: 1  Authorization period: 9/18/2019      Lisa Liao is a 48 y.o. female that presents to [facility] secondary to bilateral foot pain.  Patient reports it stops and comes back and currently it is painful.       Patient c/o: continuous/intermittent symptoms  Radicular symptoms: present on the bottom of the feet, patient does have a history of diabetes   Onset:: insidious/sudden/gradual   Pain Scale: Rates pain on a scale of 0-10 to be 8 at worst; 8 currently; 5 at best .  Aggravating factors: I don't know, all I know what makes it flares up and it starts to hurt - all day pain, but when she first wakes up in the morning it is hard to walk on, if I get up out a chair or sit down period (mostly the left)   Relieving factors: sometimes just goes away on its own   Previous treatment: no help with therapy in the past   Imaging: none present in chart for current episode   Past surgical history: Gallbladder   Functional deficits: Everything, daily activities, sometimes it hurts to even put a shoe on the foot   Prior level of function: Over a year with these symptoms, patient reports that there are no changes in her activities in the last year   Occupation: Patient does not work in a couple of years   Environment: Patient indicates problems with her feet and knees   No cultural or spiritual barriers identified to treatment or learning.  Patient's goals: See if it helps my feet     Objective     Observation/posture: bilateral navicular drop, endomorph body type, BLE edema   Sensation/Reflexes: grossly intact to light touch throughout BLE  Palpation:   L foot: tenderness to palpation along medial aspect of foot, peroneus group, extensor digitorum longus   R foot: plantar surface of calcaneous, medial aspect of foot  Ankle    Right     Left    Pain/Dysfunction with Movement      AROM  PROM  MMT  AROM  PROM  MMT     Plantarflexion  45 45 4+ 45 45 4-    Dorsiflexion  -15 WNL 4+ -3 5 4-    Inversion  20 40 4+ 40 40 4-    Eversion  5 15 4+  0 15 4-      toe ROM and strength: ROM WNL   R toe extension: 3+/5; flexion: 4-/5   L toe extension: 3+/5; flexion: 4-/5  hip  screen:  R L   Flex 4- 3-   ER 4- 3+   IR 4- 3+  Knee screen:    Flex 4- 4-   Ext 4- 4-    Balance:                 SLS: unable to perform B, LOB with min A to recover (<10 sec indicates high fall risk)  Tandem stance: unable to perform B  Gait: antalgic gait L    Functional Limitations Reports - G Codes  Category: Mobility   Intake 70% Current Status CL -    Predicted 53% Goal Status+ CK -     PT Evaluation Completed? Yes  Discussed Plan of Care with patient: Yes    Treatment: Physical Therapist educated patient in a home exercise program and issued handout. Patient demonstrating good understanding of education provided and able to return demonstration of correct technique. Patient given the following exercises:       Seated ankle ABCs x2 rounds    Seated towel crunches 2x10    Seated great to extension stretch 3x30 seconds    Seated gastroc stretch 2x30     Ankle PF (YTB) 2x10     Assessment       Initial Assessment: Ms. Shruthi Kaiser is a 45 y/o female referred to outpatient PT for bilateral foot pain. Pt presenting with the following impairments upon initial evaluation: decreased bilateral ankle ROM, weakness of bilateral ankles and LE's, poor balance, muscle tightness in BLE, and bilateral navicular drop. Patient has been seen for two additional episodes of physical therapy without much improvements, according to patient. Patient was given HEP following today's treatment to stretch ankle and LE. Patient will benefit from activities to strength LE to improve base of support as well as improve her balance. Patient was educated on the impact of her weight on the joints of her LE and feet and was encouraged to try and get into a fitness routine to improve her standing tolerance. Patient was also encouraged to stretch first thing in the morning in order to improve her gait and reduce pain symptoms.  Pt would benefit from skilled PT to address above stated problems and maximize functional independence. Pt  has set realistic goals and has verbalized good understanding and agreement with reported diagnosis, prognosis and treatment. Pt demonstrates no additional cultural, spiritual or educational need and currently has no barriers to learning.      History  Co-morbidities and personal factors that may impact the plan of care Examination  Body Structures and Functions, activity limitations and participation restrictions that may impact the plan of care Clinical Presentation   Decision Making/ Complexity Score   Co-morbidities:      obesity, HTN, seizure disorder            Personal Factors:   Assist in taking care of her grandson Body Regions: bilateral LE and bilateral feet    Body Systems:   Musculoskeletal:  weakness, gait instability, impaired balance, pain, decreased ROM, impaired joint extensibility and impaired muscle length    Activity limitations:   Unable to walk >30 minutes, stand >30 minute, squat, lift    Participation Restrictions: (W/D/S)  diffiulcty running errands for family  Unable to work secondary to pain       stable  low     Prognosis: Fair     Medical necessity is demonstrated by the following IMPAIRMENTS/PROBLEM LIST:     1) Increase in pain level limiting function   2) Decreased ROM of left ankle   3) Decreased mobility and function   4) Muscle weakness    5) Lack of HEP   6) Poor balance     Short Term Goals (4 Weeks):   1. Patient will report 20% reduction in bilateral foot pain for ease with playing with grandson  2. Pt will demonstrate 1/3 MMT improvement in bilateral ankle and foot strength for ease with assisting with ADL's  3. Pt will demonstrate SLS on each foot x10 seconds for ease with climbing stairs and decreased risk for falls  4. Patient to be independent with home exercise program for improved self management of condition  Long Term Goals (8 Weeks):   1. Pt will demonstrate improvements in B hip/knee strength by 1/3 MMT for ease with ADL's such as stair climbing  2. PT will  demonstrate full ROM and strength in bilateral feet/ankles for ease with taking grandson to the park  3. Pt will report being independent with HEP for maintenance of improvements gained during therapy sessions  4. Pt will score <60% limitation on the FOTO for ease with return to participation within the community    Plan     Patient will be treated by physical therapy 1-2 times a week for 12 weeks for Electrical Stimulation PRN, Iontophoresis (with dexamethasone PRN), Manual Therapy, Moist Heat/ Ice, Neuromuscular Re-ed, Patient Education, Therapeutic Activites, Therapeutic Exercise and Other therapeutic taping, dry needling, aquatic therapy to achieve established goals. Yanni may at times be seen by a PTA as part of the Rehab Team.      Cont PT 1-2 times a week for 12 weeks during the certification period (10/4/2018 - 1/4/2018) PN Due: (11/4/2018)       I certify the need for these services furnished under this plan of treatment and while under my care.______________________________ Physician/Referring Practitioner  Date of Signature      Nick Valencia, PT  10/4/2018

## 2018-10-04 NOTE — PROGRESS NOTES
See full Physical Therapy Evaluation in POC     Review of patient's allergies indicates:  No Known Allergies  Precautions: Standard     Evaluation Date: 10/4/2018   Visit # authorized: 1  Authorization period: 9/18/2019    Treatment: Physical Therapist educated patient in a home exercise program and issued handout. Patient demonstrating good understanding of education provided and able to return demonstration of correct technique. Patient given the following exercises:       Seated ankle ABCs x2 rounds    Seated towel crunches 2x10    Seated great to extension stretch 3x30 seconds    Seated gastroc stretch 2x30     Ankle PF (YTB) 2x10     Prognosis: Fair     Medical necessity is demonstrated by the following IMPAIRMENTS/PROBLEM LIST:     1) Increase in pain level limiting function   2) Decreased ROM of left ankle   3) Decreased mobility and function   4) Muscle weakness    5) Lack of HEP   6) Poor balance     Short Term Goals (4 Weeks):   1. Patient will report 20% reduction in bilateral foot pain for ease with playing with grandson  2. Pt will demonstrate 1/3 MMT improvement in bilateral ankle and foot strength for ease with assisting with ADL's  3. Pt will demonstrate SLS on each foot x10 seconds for ease with climbing stairs and decreased risk for falls  4. Patient to be independent with home exercise program for improved self management of condition  Long Term Goals (8 Weeks):   1. Pt will demonstrate improvements in B hip/knee strength by 1/3 MMT for ease with ADL's such as stair climbing  2. PT will demonstrate full ROM and strength in bilateral feet/ankles for ease with taking grandson to the park  3. Pt will report being independent with HEP for maintenance of improvements gained during therapy sessions  4. Pt will score <60% limitation on the FOTO for ease with return to participation within the community    Plan     Cont PT 1-2 times a week for 12 weeks during the certification period (10/4/2018 -  1/4/2018) PN Due: (11/4/2018)

## 2018-10-04 NOTE — Clinical Note
Pt continues to smoke, she said she doesn't know how many per day.   Pt remains on tobacco cessation medication of  21 mg nicotine patch QD and 2 mg nicotine lozenge  PRN (1-2 per hour in place of cigarettes.)  But admits she does not use them consistently.  She has a lot of family stressors at this time, new grand baby born yesterday and in NICU.  Tried best to motivate patient to get back on program , she seemed a little more motivated , she agreed to start attending group when suggested this could help her stay on track.   Reviewed coping strategies/habitual behavior/relapse prevention with patient. Exhaled carbon monoxide level was 4 ppm per [employer]  ( non- smoker = 0-5 ppm .)  Will see pt back in group next week.  She understands she can call any time.

## 2018-10-04 NOTE — PROGRESS NOTES
Individual Follow-Up Form    10/4/2018    Quit Date:     Clinical Status of Patient: Outpatient    Length of Service: 30 minutes    Continuing Medication: yes  Patches    Other Medications:      Target Symptoms: Withdrawal and medication side effects. The following were rated moderate (3) to severe (4) on TCRS:  · Moderate (3):none  · Severe (4): none    Comments: Pt continues to smoke, she said she doesn't know how many per day.   Pt remains on tobacco cessation medication of  21 mg nicotine patch QD and 2 mg nicotine lozenge  PRN (1-2 per hour in place of cigarettes.)  But admits she does not use them consistently.  She has a lot of family stressors at this time, new grand baby born yesterday and in NICU.  Tried best to motivate patient to get back on program , she seemed a little more motivated , she agreed to start attending group when suggested this could help her stay on track.   Reviewed coping strategies/habitual behavior/relapse prevention with patient. Exhaled carbon monoxide level was 4 ppm per [employer]  ( non- smoker = 0-5 ppm .)  Will see pt back in group next week.   She understands she can call any time.       Diagnosis: F17.200    Next Visit: 2 weeks

## 2018-10-08 ENCOUNTER — CLINICAL SUPPORT (OUTPATIENT)
Dept: SMOKING CESSATION | Facility: CLINIC | Age: 48
End: 2018-10-08
Payer: COMMERCIAL

## 2018-10-08 DIAGNOSIS — F17.200 NICOTINE DEPENDENCE: Primary | ICD-10-CM

## 2018-10-08 PROCEDURE — 99406 BEHAV CHNG SMOKING 3-10 MIN: CPT | Mod: S$GLB,,,

## 2018-10-10 ENCOUNTER — OFFICE VISIT (OUTPATIENT)
Dept: OBSTETRICS AND GYNECOLOGY | Facility: CLINIC | Age: 48
End: 2018-10-10
Payer: MEDICARE

## 2018-10-10 ENCOUNTER — ANESTHESIA EVENT (OUTPATIENT)
Dept: SURGERY | Facility: OTHER | Age: 48
End: 2018-10-10
Payer: MEDICARE

## 2018-10-10 ENCOUNTER — HOSPITAL ENCOUNTER (OUTPATIENT)
Dept: PREADMISSION TESTING | Facility: OTHER | Age: 48
Discharge: HOME | End: 2018-10-10
Attending: OBSTETRICS & GYNECOLOGY
Payer: MEDICARE

## 2018-10-10 VITALS
WEIGHT: 293 LBS | SYSTOLIC BLOOD PRESSURE: 142 MMHG | HEART RATE: 67 BPM | HEIGHT: 64 IN | TEMPERATURE: 98 F | OXYGEN SATURATION: 97 % | BODY MASS INDEX: 50.02 KG/M2 | DIASTOLIC BLOOD PRESSURE: 91 MMHG

## 2018-10-10 DIAGNOSIS — N93.8 DUB (DYSFUNCTIONAL UTERINE BLEEDING): Primary | ICD-10-CM

## 2018-10-10 LAB
ANION GAP SERPL CALC-SCNC: 7 MMOL/L
BASOPHILS # BLD AUTO: 0.03 K/UL
BASOPHILS NFR BLD: 0.9 %
BUN SERPL-MCNC: 12 MG/DL
CALCIUM SERPL-MCNC: 8.8 MG/DL
CHLORIDE SERPL-SCNC: 109 MMOL/L
CO2 SERPL-SCNC: 23 MMOL/L
CREAT SERPL-MCNC: 1.1 MG/DL
DIFFERENTIAL METHOD: ABNORMAL
EOSINOPHIL # BLD AUTO: 0.2 K/UL
EOSINOPHIL NFR BLD: 6 %
ERYTHROCYTE [DISTWIDTH] IN BLOOD BY AUTOMATED COUNT: 16.1 %
EST. GFR  (AFRICAN AMERICAN): >60 ML/MIN/1.73 M^2
EST. GFR  (NON AFRICAN AMERICAN): >60 ML/MIN/1.73 M^2
GLUCOSE SERPL-MCNC: 101 MG/DL
HCT VFR BLD AUTO: 38 %
HGB BLD-MCNC: 11.6 G/DL
LYMPHOCYTES # BLD AUTO: 1.5 K/UL
LYMPHOCYTES NFR BLD: 42.1 %
MCH RBC QN AUTO: 26.4 PG
MCHC RBC AUTO-ENTMCNC: 30.5 G/DL
MCV RBC AUTO: 86 FL
MONOCYTES # BLD AUTO: 0.4 K/UL
MONOCYTES NFR BLD: 10.9 %
NEUTROPHILS # BLD AUTO: 1.4 K/UL
NEUTROPHILS NFR BLD: 40.1 %
PLATELET # BLD AUTO: 319 K/UL
PMV BLD AUTO: 9.2 FL
POTASSIUM SERPL-SCNC: 3.7 MMOL/L
RBC # BLD AUTO: 4.4 M/UL
SODIUM SERPL-SCNC: 139 MMOL/L
WBC # BLD AUTO: 3.49 K/UL

## 2018-10-10 PROCEDURE — 85025 COMPLETE CBC W/AUTO DIFF WBC: CPT

## 2018-10-10 PROCEDURE — 36415 COLL VENOUS BLD VENIPUNCTURE: CPT

## 2018-10-10 PROCEDURE — 80048 BASIC METABOLIC PNL TOTAL CA: CPT

## 2018-10-10 PROCEDURE — 99999 PR PBB SHADOW E&M-EST. PATIENT-LVL I: CPT | Mod: PBBFAC,,, | Performed by: OBSTETRICS & GYNECOLOGY

## 2018-10-10 PROCEDURE — 99211 OFF/OP EST MAY X REQ PHY/QHP: CPT | Mod: PBBFAC | Performed by: OBSTETRICS & GYNECOLOGY

## 2018-10-10 PROCEDURE — 99499 UNLISTED E&M SERVICE: CPT | Mod: S$PBB,,, | Performed by: OBSTETRICS & GYNECOLOGY

## 2018-10-10 RX ORDER — SODIUM CHLORIDE, SODIUM LACTATE, POTASSIUM CHLORIDE, CALCIUM CHLORIDE 600; 310; 30; 20 MG/100ML; MG/100ML; MG/100ML; MG/100ML
INJECTION, SOLUTION INTRAVENOUS CONTINUOUS
Status: CANCELLED | OUTPATIENT
Start: 2018-10-10

## 2018-10-10 RX ORDER — FAMOTIDINE 20 MG/1
20 TABLET, FILM COATED ORAL
Status: CANCELLED | OUTPATIENT
Start: 2018-10-10 | End: 2018-10-10

## 2018-10-10 RX ORDER — LIDOCAINE HYDROCHLORIDE 10 MG/ML
1 INJECTION, SOLUTION EPIDURAL; INFILTRATION; INTRACAUDAL; PERINEURAL ONCE
Status: CANCELLED | OUTPATIENT
Start: 2018-10-10 | End: 2018-10-10

## 2018-10-10 RX ORDER — MISOPROSTOL 200 UG/1
TABLET ORAL
Qty: 4 TABLET | Refills: 0 | Status: SHIPPED | OUTPATIENT
Start: 2018-10-10 | End: 2018-10-19

## 2018-10-10 RX ORDER — LIDOCAINE HYDROCHLORIDE 10 MG/ML
0.5 INJECTION, SOLUTION EPIDURAL; INFILTRATION; INTRACAUDAL; PERINEURAL ONCE
Status: CANCELLED | OUTPATIENT
Start: 2018-10-10 | End: 2018-10-10

## 2018-10-10 RX ORDER — ALBUTEROL SULFATE 0.83 MG/ML
2.5 SOLUTION RESPIRATORY (INHALATION)
Status: CANCELLED | OUTPATIENT
Start: 2018-10-10 | End: 2018-10-10

## 2018-10-10 RX ORDER — MIDAZOLAM HYDROCHLORIDE 1 MG/ML
2 INJECTION INTRAMUSCULAR; INTRAVENOUS ONCE AS NEEDED
Status: DISCONTINUED | OUTPATIENT
Start: 2018-10-16 | End: 2018-10-11 | Stop reason: HOSPADM

## 2018-10-10 NOTE — ANESTHESIA PREPROCEDURE EVALUATION
10/10/2018  Yanni Kaiser is a 48 y.o., female.    Anesthesia Evaluation    I have reviewed the Patient Summary Reports.    I have reviewed the Nursing Notes.   I have reviewed the Medications.     Review of Systems  Anesthesia Hx:  No problems with previous Anesthesia  Denies Family Hx of Anesthesia complications.    Social:  Smoker    Hematology/Oncology:     Oncology Normal    -- Anemia:   EENT/Dental:   chronic allergic rhinitis   Cardiovascular:   Hypertension, well controlled    Pulmonary:   Asthma moderate Sleep Apnea    Renal/:   Chronic Renal Disease, CRI    Hepatic/GI:   GERD, well controlled    Musculoskeletal:  Musculoskeletal Normal    Neurological:   Headaches Seizures (pseudotumor cerebri), well controlled    Endocrine:   Diabetes, well controlled    Dermatological:  Skin Normal    Psych:   Psychiatric History depression          Physical Exam  General:  Morbid Obesity    Airway/Jaw/Neck:  Airway Findings: Mouth Opening: Normal Tongue: Normal  General Airway Assessment: Adult  Mallampati: II  TM Distance: Normal, at least 6 cm  Jaw/Neck Findings:     Neck ROM: Normal ROM      Dental:  Dental Findings: In tact             Anesthesia Plan  Type of Anesthesia, risks & benefits discussed:  Anesthesia Type:  general  Patient's Preference:   Intra-op Monitoring Plan: standard ASA monitors  Intra-op Monitoring Plan Comments:   Post Op Pain Control Plan:   Post Op Pain Control Plan Comments:   Induction:   IV  Beta Blocker:         Informed Consent: Patient understands risks and agrees with Anesthesia plan.  Questions answered. Anesthesia consent signed with patient.  ASA Score: 3     Day of Surgery Review of History & Physical:    H&P update referred to the surgeon.     Anesthesia Plan Notes: Labs ordered, breathing tx ordered.    Day of surgery update: hct 38        Ready For Surgery From  Anesthesia Perspective.

## 2018-10-10 NOTE — H&P (VIEW-ONLY)
PT HERE WITH NO BLEEDING X 3 YEARS AND STARTED BLEEDING YESTERDAY. SOME CRAMPS. NOT HEAVY. NO CLOTS.  NOW NO BLEEDING BUT CON'T WITH LOW ABD CRAMPS.  HAS  SX'S IN GENERAL ARE SEVERE.       7/11/2018    TSH 1.108   hCG Quant <1.2   FSH 36.20   Prolactin 42.2 (H)      07/11/18 U/S  FINDINGS:  There is poor visualization of the uterus and pelvic structures due to patient body habitus and on transabdominal imaging incomplete bladder distension.  The uterus is enlarged in size and measures 12.8 x 6.8 x 7.8 cm.  The endometrial stripe is not visualized.  The ovaries are not visualized.   Impression      Nonvisualization of the ovaries.  Limited evaluation of the uterus demonstrates enlargement.      ROS:  GENERAL: No fever, chills, fatigability or weight loss.  VULVAR: No pain, no lesions and no itching.  VAGINAL: No relaxation, no itching, no discharge, no abnormal bleeding and no lesions.  ABDOMEN: No abdominal pain. Denies nausea. Denies vomiting. No diarrhea. No constipation  BREAST: Denies pain. No lumps. No discharge.  URINARY: No incontinence, no nocturia, no frequency and no dysuria.  CARDIOVASCULAR: No chest pain. No shortness of breath. No leg cramps.  NEUROLOGICAL: No headaches. No vision changes.  The remainder of the review of systems was negative.    PE:  General Appearance: obese And Well developed. Well nourished. In no acute distress.  Vulva: Lesions: No.  Urethral Meatus: Normal size. Normal location. No lesions. No prolapse.  Urethra: No masses. No tenderness. No prolapse. No scarring.  Bladder: No masses. No tenderness.  Vagina: Mucosa NI:yes discharge no, atrophy no, cystocele no or rectocele no.  MOD MENSES  Cervix: Lesion: no  Stenotic: yes Cervical motion tenderness: no  Uterus:  UTD BMI  Adnexa: UTD BMI  Abdomen: UTD BMI  CHEST: CTA B  HEART: RRR  EXT: NO EDEMA        PROCEDURES:    -FINAL PATHOLOGIC DIAGNOSIS  Endometrium, biopsy:  - Minute fragment of benign ectocervical tissue.  - No  endometrium identified.  - See comment.  COMMENT: There is no endometrium identified within this biopsy.    PLAN:      DIAGNOSIS:  1. DUB (dysfunctional uterine bleeding)    2. Body mass index 60.0-69.9, adult        20 MIN D/W PT ON RISKS D&C HSCOPE

## 2018-10-10 NOTE — DISCHARGE INSTRUCTIONS
PRE-ADMIT TESTING -  [phone]    [address]  [place] [facility]          Your surgery has been scheduled at [facility]. We are pleased to have the opportunity to serve you. For Further Information please call [phone].    On the day of surgery please report to the Information Desk on the 1st floor.    · CONTACT YOUR PHYSICIAN'S OFFICE THE DAY PRIOR TO YOUR SURGERY TO OBTAIN YOUR ARRIVAL TIME.     · The evening before surgery do not eat anything after 9 p.m. ( this includes hard candy, chewing gum and mints).  You may only have GATORADE, POWERADE AND WATER  from 9 p.m. until you leave your home.   DO NOT DRINK ANY LIQUIDS ON THE WAY TO THE HOSPITAL.      SPECIAL MEDICATION INSTRUCTIONS: TAKE medications checked off by the Anesthesiologist on your Medication List.    Angiogram Patients: Take medications as instructed by your physician, including aspirin.     Surgery Patients:    If you take ASPIRIN - Your PHYSICIAN/SURGEON will need to inform you IF/OR when you need to stop taking aspirin prior to your surgery.     Do Not take any medications containing IBUPROFEN.  Do Not Wear any make-up or dark nail polish   (especially eye make-up) to surgery. If you come to surgery with makeup on you will be required to remove the makeup or nail polish.    Do not shave your surgical area at least 5 days prior to your surgery. The surgical prep will be performed at the hospital according to Infection Control regulations.    Leave all valuables at home.   Do Not wear any jewelry or watches, including any metal in body piercings.  Contact Lens must be removed before surgery. Either do not wear the contact lens or bring a case and solution for storage.  Please bring a container for eyeglasses or dentures as required.  Bring any paperwork your physician has provided, such as consent forms,  history and physicals, doctor's orders, etc.   Bring comfortable clothes that are loose fitting to wear upon  discharge. Take into consideration the type of surgery being performed.  Maintain your diet as advised per your physician the day prior to surgery.      Adequate rest the night before surgery is advised.   Park in the Parking lot behind the hospital or in the [facility] across the street from the parking lot. Parking is complimentary.  If you will be discharged the same day as your procedure, please arrange for a responsible adult to drive you home or to accompany you if traveling by taxi.   YOU WILL NOT BE PERMITTED TO DRIVE OR TO LEAVE THE HOSPITAL ALONE AFTER SURGERY.   It is strongly recommended that you arrange for someone to remain with you for the first 24 hrs following your surgery.       Thank you for your cooperation.  The Staff of [facility].        Bathing Instructions                                                                 Please shower the evening before and morning of your procedure with    ANTIBACTERIAL SOAP. ( DIAL, etc )  Concentrate on the surgical area   for at least 3 minutes and rinse completely. Dry off as usual.   Do not use any deodorant, powder, body lotions, perfume, after shave or    cologne.

## 2018-10-16 ENCOUNTER — ANESTHESIA (OUTPATIENT)
Dept: SURGERY | Facility: OTHER | Age: 48
End: 2018-10-16
Payer: MEDICARE

## 2018-10-16 ENCOUNTER — HOSPITAL ENCOUNTER (OUTPATIENT)
Facility: OTHER | Age: 48
Discharge: HOME | End: 2018-10-16
Attending: OBSTETRICS & GYNECOLOGY | Admitting: OBSTETRICS & GYNECOLOGY
Payer: MEDICARE

## 2018-10-16 VITALS
HEIGHT: 64 IN | BODY MASS INDEX: 50.02 KG/M2 | RESPIRATION RATE: 16 BRPM | HEART RATE: 77 BPM | SYSTOLIC BLOOD PRESSURE: 151 MMHG | TEMPERATURE: 98 F | WEIGHT: 293 LBS | DIASTOLIC BLOOD PRESSURE: 81 MMHG | OXYGEN SATURATION: 97 %

## 2018-10-16 DIAGNOSIS — N93.8 DYSFUNCTIONAL UTERINE BLEEDING: ICD-10-CM

## 2018-10-16 DIAGNOSIS — Z98.890 S/P D&C (STATUS POST DILATION AND CURETTAGE): Primary | ICD-10-CM

## 2018-10-16 DIAGNOSIS — N93.8 DUB (DYSFUNCTIONAL UTERINE BLEEDING): ICD-10-CM

## 2018-10-16 LAB
B-HCG UR QL: NEGATIVE
CTP QC/QA: YES
POCT GLUCOSE: 108 MG/DL (ref 70–110)

## 2018-10-16 PROCEDURE — 25000242 PHARM REV CODE 250 ALT 637 W/ HCPCS: Performed by: ANESTHESIOLOGY

## 2018-10-16 PROCEDURE — 37000009 HC ANESTHESIA EA ADD 15 MINS: Performed by: OBSTETRICS & GYNECOLOGY

## 2018-10-16 PROCEDURE — 25000003 PHARM REV CODE 250: Performed by: ANESTHESIOLOGY

## 2018-10-16 PROCEDURE — 71000015 HC POSTOP RECOV 1ST HR: Performed by: OBSTETRICS & GYNECOLOGY

## 2018-10-16 PROCEDURE — 82962 GLUCOSE BLOOD TEST: CPT | Performed by: OBSTETRICS & GYNECOLOGY

## 2018-10-16 PROCEDURE — 63600175 PHARM REV CODE 636 W HCPCS: Performed by: ANESTHESIOLOGY

## 2018-10-16 PROCEDURE — 63600175 PHARM REV CODE 636 W HCPCS: Performed by: NURSE ANESTHETIST, CERTIFIED REGISTERED

## 2018-10-16 PROCEDURE — 00952 ANES VAG PX HYSTSC&/HSG: CPT | Performed by: OBSTETRICS & GYNECOLOGY

## 2018-10-16 PROCEDURE — 37000008 HC ANESTHESIA 1ST 15 MINUTES: Performed by: OBSTETRICS & GYNECOLOGY

## 2018-10-16 PROCEDURE — 94640 AIRWAY INHALATION TREATMENT: CPT

## 2018-10-16 PROCEDURE — 94761 N-INVAS EAR/PLS OXIMETRY MLT: CPT

## 2018-10-16 PROCEDURE — C1782 MORCELLATOR: HCPCS | Performed by: OBSTETRICS & GYNECOLOGY

## 2018-10-16 PROCEDURE — 71000033 HC RECOVERY, INTIAL HOUR: Performed by: OBSTETRICS & GYNECOLOGY

## 2018-10-16 PROCEDURE — 27201423 OPTIME MED/SURG SUP & DEVICES STERILE SUPPLY: Performed by: OBSTETRICS & GYNECOLOGY

## 2018-10-16 PROCEDURE — 88305 TISSUE EXAM BY PATHOLOGIST: CPT | Mod: 59 | Performed by: PATHOLOGY

## 2018-10-16 PROCEDURE — 36000707: Performed by: OBSTETRICS & GYNECOLOGY

## 2018-10-16 PROCEDURE — 81025 URINE PREGNANCY TEST: CPT | Performed by: ANESTHESIOLOGY

## 2018-10-16 PROCEDURE — 58558 HYSTEROSCOPY BIOPSY: CPT | Mod: ,,, | Performed by: OBSTETRICS & GYNECOLOGY

## 2018-10-16 PROCEDURE — 88305 TISSUE EXAM BY PATHOLOGIST: CPT | Mod: 26,,, | Performed by: PATHOLOGY

## 2018-10-16 PROCEDURE — 71000016 HC POSTOP RECOV ADDL HR: Performed by: OBSTETRICS & GYNECOLOGY

## 2018-10-16 PROCEDURE — 25000003 PHARM REV CODE 250: Performed by: NURSE ANESTHETIST, CERTIFIED REGISTERED

## 2018-10-16 PROCEDURE — 25000003 PHARM REV CODE 250: Performed by: OBSTETRICS & GYNECOLOGY

## 2018-10-16 PROCEDURE — 36000706: Performed by: OBSTETRICS & GYNECOLOGY

## 2018-10-16 RX ORDER — ONDANSETRON 2 MG/ML
INJECTION INTRAMUSCULAR; INTRAVENOUS
Status: DISCONTINUED | OUTPATIENT
Start: 2018-10-16 | End: 2018-10-16

## 2018-10-16 RX ORDER — ONDANSETRON 8 MG/1
8 TABLET, ORALLY DISINTEGRATING ORAL EVERY 8 HOURS PRN
Status: DISCONTINUED | OUTPATIENT
Start: 2018-10-16 | End: 2018-10-16 | Stop reason: HOSPADM

## 2018-10-16 RX ORDER — FENTANYL CITRATE 50 UG/ML
25 INJECTION, SOLUTION INTRAMUSCULAR; INTRAVENOUS EVERY 5 MIN PRN
Status: DISCONTINUED | OUTPATIENT
Start: 2018-10-16 | End: 2018-10-16 | Stop reason: HOSPADM

## 2018-10-16 RX ORDER — ACETAMINOPHEN 10 MG/ML
INJECTION, SOLUTION INTRAVENOUS
Status: DISCONTINUED | OUTPATIENT
Start: 2018-10-16 | End: 2018-10-16

## 2018-10-16 RX ORDER — SODIUM CHLORIDE, SODIUM LACTATE, POTASSIUM CHLORIDE, CALCIUM CHLORIDE 600; 310; 30; 20 MG/100ML; MG/100ML; MG/100ML; MG/100ML
INJECTION, SOLUTION INTRAVENOUS CONTINUOUS
Status: DISCONTINUED | OUTPATIENT
Start: 2018-10-16 | End: 2018-10-16 | Stop reason: HOSPADM

## 2018-10-16 RX ORDER — ALBUTEROL SULFATE 0.83 MG/ML
2.5 SOLUTION RESPIRATORY (INHALATION)
Status: COMPLETED | OUTPATIENT
Start: 2018-10-16 | End: 2018-10-16

## 2018-10-16 RX ORDER — IBUPROFEN 800 MG/1
800 TABLET ORAL EVERY 8 HOURS PRN
Qty: 30 TABLET | Refills: 0 | Status: SHIPPED | OUTPATIENT
Start: 2018-10-16 | End: 2018-10-19

## 2018-10-16 RX ORDER — LIDOCAINE HYDROCHLORIDE 10 MG/ML
0.5 INJECTION, SOLUTION EPIDURAL; INFILTRATION; INTRACAUDAL; PERINEURAL ONCE
Status: DISCONTINUED | OUTPATIENT
Start: 2018-10-16 | End: 2018-10-16 | Stop reason: HOSPADM

## 2018-10-16 RX ORDER — LIDOCAINE HYDROCHLORIDE 10 MG/ML
1 INJECTION, SOLUTION EPIDURAL; INFILTRATION; INTRACAUDAL; PERINEURAL ONCE
Status: DISCONTINUED | OUTPATIENT
Start: 2018-10-16 | End: 2018-10-16 | Stop reason: HOSPADM

## 2018-10-16 RX ORDER — DIPHENHYDRAMINE HCL 25 MG
25 CAPSULE ORAL EVERY 4 HOURS PRN
Status: DISCONTINUED | OUTPATIENT
Start: 2018-10-16 | End: 2018-10-16 | Stop reason: HOSPADM

## 2018-10-16 RX ORDER — HYDROMORPHONE HYDROCHLORIDE 2 MG/ML
0.4 INJECTION, SOLUTION INTRAMUSCULAR; INTRAVENOUS; SUBCUTANEOUS EVERY 5 MIN PRN
Status: DISCONTINUED | OUTPATIENT
Start: 2018-10-16 | End: 2018-10-16 | Stop reason: HOSPADM

## 2018-10-16 RX ORDER — MIDAZOLAM HYDROCHLORIDE 1 MG/ML
INJECTION INTRAMUSCULAR; INTRAVENOUS
Status: DISCONTINUED | OUTPATIENT
Start: 2018-10-16 | End: 2018-10-16

## 2018-10-16 RX ORDER — SODIUM CHLORIDE 0.9 % (FLUSH) 0.9 %
3 SYRINGE (ML) INJECTION
Status: DISCONTINUED | OUTPATIENT
Start: 2018-10-16 | End: 2018-10-16 | Stop reason: HOSPADM

## 2018-10-16 RX ORDER — OXYCODONE HYDROCHLORIDE 5 MG/1
5 TABLET ORAL
Status: DISCONTINUED | OUTPATIENT
Start: 2018-10-16 | End: 2018-10-16 | Stop reason: HOSPADM

## 2018-10-16 RX ORDER — ONDANSETRON 2 MG/ML
4 INJECTION INTRAMUSCULAR; INTRAVENOUS DAILY PRN
Status: DISCONTINUED | OUTPATIENT
Start: 2018-10-16 | End: 2018-10-16 | Stop reason: HOSPADM

## 2018-10-16 RX ORDER — OXYCODONE AND ACETAMINOPHEN 5; 325 MG/1; MG/1
1 TABLET ORAL EVERY 4 HOURS PRN
Qty: 10 TABLET | Refills: 0 | Status: SHIPPED | OUTPATIENT
Start: 2018-10-16 | End: 2019-01-11

## 2018-10-16 RX ORDER — HYDROCODONE BITARTRATE AND ACETAMINOPHEN 5; 325 MG/1; MG/1
1 TABLET ORAL EVERY 4 HOURS PRN
Status: DISCONTINUED | OUTPATIENT
Start: 2018-10-16 | End: 2018-10-16 | Stop reason: HOSPADM

## 2018-10-16 RX ORDER — ROCURONIUM BROMIDE 10 MG/ML
INJECTION, SOLUTION INTRAVENOUS
Status: DISCONTINUED | OUTPATIENT
Start: 2018-10-16 | End: 2018-10-16

## 2018-10-16 RX ORDER — SUCCINYLCHOLINE CHLORIDE 20 MG/ML
INJECTION INTRAMUSCULAR; INTRAVENOUS
Status: DISCONTINUED | OUTPATIENT
Start: 2018-10-16 | End: 2018-10-16

## 2018-10-16 RX ORDER — FENTANYL CITRATE 50 UG/ML
INJECTION, SOLUTION INTRAMUSCULAR; INTRAVENOUS
Status: DISCONTINUED | OUTPATIENT
Start: 2018-10-16 | End: 2018-10-16

## 2018-10-16 RX ORDER — FAMOTIDINE 20 MG/1
20 TABLET, FILM COATED ORAL
Status: COMPLETED | OUTPATIENT
Start: 2018-10-16 | End: 2018-10-16

## 2018-10-16 RX ORDER — MEPERIDINE HYDROCHLORIDE 50 MG/ML
12.5 INJECTION INTRAMUSCULAR; INTRAVENOUS; SUBCUTANEOUS ONCE AS NEEDED
Status: DISCONTINUED | OUTPATIENT
Start: 2018-10-16 | End: 2018-10-16 | Stop reason: HOSPADM

## 2018-10-16 RX ORDER — PROPOFOL 10 MG/ML
VIAL (ML) INTRAVENOUS
Status: DISCONTINUED | OUTPATIENT
Start: 2018-10-16 | End: 2018-10-16

## 2018-10-16 RX ORDER — LIDOCAINE HCL/PF 100 MG/5ML
SYRINGE (ML) INTRAVENOUS
Status: DISCONTINUED | OUTPATIENT
Start: 2018-10-16 | End: 2018-10-16

## 2018-10-16 RX ORDER — DIPHENHYDRAMINE HYDROCHLORIDE 50 MG/ML
12.5 INJECTION INTRAMUSCULAR; INTRAVENOUS EVERY 30 MIN PRN
Status: DISCONTINUED | OUTPATIENT
Start: 2018-10-16 | End: 2018-10-16 | Stop reason: HOSPADM

## 2018-10-16 RX ORDER — DIPHENHYDRAMINE HYDROCHLORIDE 50 MG/ML
25 INJECTION INTRAMUSCULAR; INTRAVENOUS EVERY 4 HOURS PRN
Status: DISCONTINUED | OUTPATIENT
Start: 2018-10-16 | End: 2018-10-16 | Stop reason: HOSPADM

## 2018-10-16 RX ADMIN — ONDANSETRON HYDROCHLORIDE 4 MG: 2 INJECTION INTRAMUSCULAR; INTRAVENOUS at 11:10

## 2018-10-16 RX ADMIN — ACETAMINOPHEN 1000 MG: 10 INJECTION, SOLUTION INTRAVENOUS at 10:10

## 2018-10-16 RX ADMIN — FAMOTIDINE 20 MG: 20 TABLET ORAL at 09:10

## 2018-10-16 RX ADMIN — SODIUM CHLORIDE, SODIUM LACTATE, POTASSIUM CHLORIDE, AND CALCIUM CHLORIDE: 600; 310; 30; 20 INJECTION, SOLUTION INTRAVENOUS at 09:10

## 2018-10-16 RX ADMIN — PROPOFOL 200 MG: 10 INJECTION, EMULSION INTRAVENOUS at 09:10

## 2018-10-16 RX ADMIN — MIDAZOLAM HYDROCHLORIDE 2 MG: 1 INJECTION, SOLUTION INTRAMUSCULAR; INTRAVENOUS at 09:10

## 2018-10-16 RX ADMIN — SUCCINYLCHOLINE CHLORIDE 160 MG: 20 INJECTION, SOLUTION INTRAMUSCULAR; INTRAVENOUS at 09:10

## 2018-10-16 RX ADMIN — LIDOCAINE HYDROCHLORIDE 100 MG: 20 INJECTION, SOLUTION INTRAVENOUS at 09:10

## 2018-10-16 RX ADMIN — ROCURONIUM BROMIDE 5 MG: 10 INJECTION, SOLUTION INTRAVENOUS at 09:10

## 2018-10-16 RX ADMIN — ONDANSETRON 4 MG: 2 INJECTION INTRAMUSCULAR; INTRAVENOUS at 10:10

## 2018-10-16 RX ADMIN — FENTANYL CITRATE 50 MCG: 50 INJECTION, SOLUTION INTRAMUSCULAR; INTRAVENOUS at 09:10

## 2018-10-16 RX ADMIN — CARBOXYMETHYLCELLULOSE SODIUM 2 DROP: 2.5 SOLUTION/ DROPS OPHTHALMIC at 09:10

## 2018-10-16 RX ADMIN — ALBUTEROL SULFATE 2.5 MG: 2.5 SOLUTION RESPIRATORY (INHALATION) at 08:10

## 2018-10-16 NOTE — OP NOTE
OPERATIVE REPORT    PREOPERATIVE DIAGNOSIS  1. Dysfunctional uterine bleeding    POSTOPERATIVE DIAGNOSIS  1. S/P D&C/Hysteroscopy    PROCEDURE:  1. Hysteroscopy  2. Dilation and curettage     SURGEON: Glenn Black MD    ASSISTANT: Giovanna Fraser MD - PGY1    ANESTHESIA: General    COMPLICATIONS: None    EBL: Minimal, less than 10 cc    IV FLUIDS: 200  cc    URINE OUTPUT: 50  cc drained via in-and-out catheterization prior to procedure    Hysteroscopy fluid deficit: 10cc    SPECIMENS:  1. Endometrial curettage    PROCEDURE:   Patient was taken to the operating room where general anesthesia was administered and found to be adequate.  She was placed in the dorsal lithotomy position using Los stirrups, then prepped and draped in the usual sterile fashion. Bladder was drained via in-and-out catheterization prior to procedure.  A surgical timeout was performed with patient's name, date of birth, procedure to be performed, and allergies verbalized. All OR staff in agreement. No preoperative antibiotics were administered as none were indicated.    Attention was then turned to the vagina where a weighted sterile speculum was placed in the posterior aspect, and a right angle retractor was placed in the anterior aspect.  THE CVX WAS VERY HIGH AND DIFFICULT TO ISOLATE INITIALLY. The anterior lip of the cervix was grasped with a single tooth tenaculum.  The uterus was sounded to approximately 10 cm. The cervix was sufficiently dilated to accommodate the hysteroscope. The 5mm hysteroscope was advanced through the cervical os and the uterus wa s distended with normal saline.  The endometrium was inspected and found to be normal appearing.  The tubal ostia were identified without difficulty, and appeared normal. The hysteroscope was withdrawn without difficulty.     The uterus was then curetted in a clockwise fashion until gritty feeling was noted in all aspects of the uterus. The endometrial scrapings were sent to pathology.  The tenaculum was removed and hemostasis was noted at the puncture sites in the cervix.     Sponge and instrument counts were correct x 2. The patient tolerated the procedure well and was awakened without difficulty. She was taken to the recovery room in stable condition.      Giovanna Fraser MD  OBGYN PGY-1      Glenn Black MD

## 2018-10-16 NOTE — DISCHARGE INSTRUCTIONS
Home Care Instruction D&C Hysteroscopy             ACTIVITY LEVEL:  If you received sedation and/or an anesthetic, you may feel sleepy for several hours. Rest until you feel more  awake. Gradually resume your normal activities.    DIET:  At home, continue with liquids. If there is no nausea, you may eat a soft diet and gradually resume a regular diet.    BATHING:  You may shower  as desired in one day.  You should avoid tub baths, hot tubs and swimming pools until seen by your physician for a post-op follow up.    CARE:  You can expect watery or bloody vaginal discharge for several days. Do not use tampons, please only use pads. Sexual activity is restricted as advised by your doctor.    MEDICATIONS:  You will receive instructions for any pain and/or antibiotic prescriptions. Pain medication should be taken only if needed and as directed. Antibiotics, if ordered, should be taken as directed until the entire prescription is completed.    ADDITIONAL INFORMATION:  __________________________________________________________________________________________  WHEN TO CALL THE DOCTOR:   For any heavy vaginal bleeding (soaking through more than one pad an hour for 2 consecutive hours)   Fever over 101°F (38.4°C)   Any lower abdominal pain not relieved by the pain mediation  RETURN APPOINTMENT:  __________________________________________________________________________________________  FOR EMERGENCIES:  If any unusual problems or difficulties occur, contact  __________________ or the resident at [phone] (page ) or the Clinic office, [phone].      Discharge Instructions: After Your Surgery  Youve just had surgery. During surgery, you were given medicine called anesthesia to keep you relaxed and free of pain. After surgery, you may have some pain or nausea. This is common. Here are some tips for feeling better and getting well after surgery.     Stay on schedule with your medicine.   Going  home  Your healthcare provider will show you how to take care of yourself when you go home. He or she will also answer your questions. Have an adult family member or friend drive you home. For the first 24 hours after your surgery:  · Do not drive or use heavy equipment.  · Do not make important decisions or sign legal papers.  · Do not drink alcohol.  · Have someone stay with you, if needed. He or she can watch for problems and help keep you safe.  Be sure to go to all follow-up visits with your healthcare provider. And rest after your surgery for as long as your healthcare provider tells you to.    Coping with pain  If you have pain after surgery, pain medicine will help you feel better. Take it as told, before pain becomes severe. Also, ask your healthcare provider or pharmacist about other ways to control pain. This might be with heat, ice, or relaxation. And follow any other instructions your surgeon or nurse gives you.    Tips for taking pain medicine  To get the best relief possible, remember these points:  · Pain medicines can upset your stomach. Taking them with a little food may help.  · Most pain relievers taken by mouth need at least 20 to 30 minutes to start to work.  · Taking medicine on a schedule can help you remember to take it. Try to time your medicine so that you can take it before starting an activity. This might be before you get dressed, go for a walk, or sit down for dinner.  · Constipation is a common side effect of pain medicines. Call your healthcare provider before taking any medicines such as laxatives or stool softeners to help ease constipation. Also ask if you should skip any foods. Drinking lots of fluids and eating foods such as fruits and vegetables that are high in fiber can also help. Remember, do not take laxatives unless your surgeon has prescribed them.  · Drinking alcohol and taking pain medicine can cause dizziness and slow your breathing. It can even be deadly. Do not drink  alcohol while taking pain medicine.  · Pain medicine can make you react more slowly to things. Do not drive or run machinery while taking pain medicine.  Your healthcare provider may tell you to take acetaminophen to help ease your pain. Ask him or her how much you are supposed to take each day. Acetaminophen or other pain relievers may interact with your prescription medicines or other over-the-counter (OTC) medicines. Some prescription medicines have acetaminophen and other ingredients. Using both prescription and OTC acetaminophen for pain can cause you to overdose. Read the labels on your OTC medicines with care. This will help you to clearly know the list of ingredients, how much to take, and any warnings. It may also help you not take too much acetaminophen. If you have questions or do not understand the information, ask your pharmacist or healthcare provider to explain it to you before you take the OTC medicine.    Managing nausea  Some people have an upset stomach after surgery. This is often because of anesthesia, pain, or pain medicine, or the stress of surgery. These tips will help you handle nausea and eat healthy foods as you get better. If you were on a special food plan before surgery, ask your healthcare provider if you should follow it while you get better. These tips may help:  · Do not push yourself to eat. Your body will tell you when to eat and how much.  · Start off with clear liquids and soup. They are easier to digest.  · Next try semi-solid foods, such as mashed potatoes, applesauce, and gelatin, as you feel ready.  · Slowly move to solid foods. Dont eat fatty, rich, or spicy foods at first.  · Do not force yourself to have 3 large meals a day. Instead eat smaller amounts more often.  · Take pain medicines with a small amount of solid food, such as crackers or toast, to avoid nausea.     Call your surgeon if  · You still have pain an hour after taking medicine. The medicine may not be  strong enough.  · You feel too sleepy, dizzy, or groggy. The medicine may be too strong.  · You have side effects like nausea, vomiting, or skin changes, such as rash, itching, or hives.       If you have obstructive sleep apnea  You were given anesthesia medicine during surgery to keep you comfortable and free of pain. After surgery, you may have more apnea spells because of this medicine and other medicines you were given. The spells may last longer than usual.   At home:  · Keep using the continuous positive airway pressure (CPAP) device when you sleep. Unless your healthcare provider tells you not to, use it when you sleep, day or night. CPAP is a common device used to treat obstructive sleep apnea.  · Talk with your provider before taking any pain medicine, muscle relaxants, or sedatives. Your provider will tell you about the possible dangers of taking these medicines.    Date Last Reviewed: 12/1/2016  © 0957-2568 [employer]. [address] [place] [address], [place] [address]. All rights reserved. This information is not intended as a substitute for professional medical care. Always follow your healthcare professional's instructions.

## 2018-10-16 NOTE — TRANSFER OF CARE
"Anesthesia Transfer of Care Note    Patient: Yanni Kaiser    Procedure(s) Performed: Procedure(s) (LRB):  HYSTEROSCOPY, WITH DILATION AND CURETTAGE OF UTERUS (N/A)    Patient location: PACU    Anesthesia Type: general    Transport from OR: Transported from OR on 2-3 L/min O2 by NC with adequate spontaneous ventilation    Post pain: adequate analgesia    Post assessment: no apparent anesthetic complications and tolerated procedure well    Post vital signs: stable    Level of consciousness: awake, alert and oriented    Nausea/Vomiting: no nausea/vomiting    Complications: none    Transfer of care protocol was followed      Last vitals:   Visit Vitals  BP (!) 176/79 (BP Location: Left arm, Patient Position: Sitting)   Pulse 71   Temp 36.8 °C (98.2 °F) (Oral)   Resp 18   Ht 5' 4" (1.626 m)   Wt (!) 160.6 kg (353 lb 15.9 oz)   LMP  (Within Months)   SpO2 95%   Breastfeeding? No   BMI 60.76 kg/m²     "

## 2018-10-16 NOTE — INTERVAL H&P NOTE
The patient has been examined and the H&P has been reviewed:    I concur with the findings and no changes have occurred since H&P was written.    Anesthesia/Surgery risks, benefits and alternative options discussed and understood by patient/family.    Nothing new since last visit with Dr. Black. All questions answered. To OR for planned procedure.    Active Hospital Problems    Diagnosis  POA    Dysfunctional uterine bleeding [N93.8]  Yes    DUB (dysfunctional uterine bleeding) [N93.8]  Yes      Resolved Hospital Problems   No resolved problems to display.     SEEN AND EXAMINED BY ME  AGREE WITH ABOVE    Glenn Black MD

## 2018-10-16 NOTE — DISCHARGE SUMMARY
[facility]  Brief Operative Note     SUMMARY     Surgery Date: 10/16/2018     Surgeon(s) and Role:     * Glenn Black Jr., MD - Primary    Assisting Surgeon: None    Pre-op Diagnosis:  DUB (dysfunctional uterine bleeding) [N93.8]    Post-op Diagnosis:  Post-Op Diagnosis Codes:     * DUB (dysfunctional uterine bleeding) [N93.8]    Procedure(s) (LRB):  HYSTEROSCOPY, WITH DILATION AND CURETTAGE OF UTERUS (N/A)    Anesthesia: General    Description of the findings of the procedure:   - - The patient was placed in lithotomy position and external genitalia was noted to be normal appearing.  - A weighted speculum was placed into the vagina with visualization of normal cervix.   - A single tooth tenaculum was placed on the anterior lip of the cervix and the uterus was sounded to 10 cm.  - The cervix was sufficiently dilated to accommodate the hysteroscope.  - The hysteroscope was introduced into the uterus. The endometrium was normal appearing. - 3 small polyps were noted. The first was fundal near the left tubal ostia. The next was on the anterior wall of the uterus. The third was near the endocervical junction.   - The truclear incisor device was introduced into the uterine cavity with removal of all 3 polyps. The specimens were sent to pathology.  - A sharp curette was advanced into the uterine cavity with scrapings of all 4 quadrants collected. Endometrial curette sent to pathology.  - The hysteroscope was again introduced into the uterine cavity with visualization of normal appearing endometrium and bilateral fallopian tube ostia.  - The hysteroscope was removed. The tenaculum was removed with hemostasis noted at puncture sites.    Estimated Blood Loss: Minimal, less than 10 cc    Specimens:   Specimen (12h ago, onward)    Start     Ordered    10/16/18 1024  Specimen to Pathology - Surgery  Once     Comments:  1. Uterine polyp2. Endometrial curettage     Start Status   10/16/18 1024 Collected  (10/16/18 1151)       10/16/18 1036          Discharge Note    SUMMARY     Admit Date: 10/16/2018    Discharge Date and Time:  10/16/2018 6:11 PM    Final Diagnosis: Post-Op Diagnosis Codes:     * DUB (dysfunctional uterine bleeding) [N93.8]    Disposition: Home or Self Care    Follow Up/Patient Instructions: Follow up with Dr. Black in 2 weeks.    Medications:  Reconciled Home Medications:      Medication List      START taking these medications    ibuprofen 800 MG tablet  Commonly known as:  ADVIL,MOTRIN  Take 1 tablet (800 mg total) by mouth every 8 (eight) hours as needed for Pain.        CHANGE how you take these medications    * oxyCODONE-acetaminophen  mg per tablet  Commonly known as:  PERCOCET  1 tablet every 6 (six) hours as needed.  What changed:  Another medication with the same name was added. Make sure you understand how and when to take each.     * oxyCODONE-acetaminophen 5-325 mg per tablet  Commonly known as:  PERCOCET  Take 1 tablet by mouth every 4 (four) hours as needed for Pain.  What changed:  You were already taking a medication with the same name, and this prescription was added. Make sure you understand how and when to take each.         * This list has 2 medication(s) that are the same as other medications prescribed for you. Read the directions carefully, and ask your doctor or other care provider to review them with you.            CONTINUE taking these medications    acetaZOLAMIDE 500 mg Cpsr  Commonly known as:  DIAMOX  Take 1 capsule (500 mg total) by mouth 2 (two) times daily.     albuterol 90 mcg/actuation inhaler  Commonly known as:  PROVENTIL/VENTOLIN HFA  Inhale 2 puffs into the lungs every 6 (six) hours as needed for Wheezing. Rescue     albuterol-ipratropium 2.5 mg-0.5 mg/3 mL nebulizer solution  Commonly known as:  DUO-NEB  Take 3 mLs by nebulization every 6 (six) hours as needed for Wheezing or Shortness of Breath. Rescue     blood sugar diagnostic Strp  1 each by  Misc.(Non-Drug; Combo Route) route 4 (four) times daily before meals and nightly. ACCU CHEK ELVIA PLUS METER     blood-glucose meter Misc  Commonly known as:  ACCU-CHEK ELVIA PLUS METER  TEST FOUR TIMES DAILY BEFORE MEALS AND EVERY NIGHT     budesonide-formoterol 160-4.5 mcg 160-4.5 mcg/actuation Hfaa  Commonly known as:  SYMBICORT  Inhale 2 puffs into the lungs every 12 (twelve) hours. Controller     capsaicin 0.1 % Crea  Apply 1 application topically 2 (two) times daily.     cetirizine 10 MG tablet  Commonly known as:  ZYRTEC  Take 1 tablet (10 mg total) by mouth 2 (two) times daily.     hydroCHLOROthiazide 25 MG tablet  Commonly known as:  HYDRODIURIL  Take 1 tablet (25 mg total) by mouth once daily.     hydrOXYzine 50 MG tablet  Commonly known as:  ATARAX  Take 1-4 at night for itching.     lancets Misc  Commonly known as:  ACCU-CHEK SOFTCLIX LANCETS  1 Device by Misc.(Non-Drug; Combo Route) route 4 (four) times daily.     LINZESS 290 mcg Cap  Generic drug:  linaclotide  TK ONE CAPSULE PO D ON AN EMPTY STOMACH     losartan 100 MG tablet  Commonly known as:  COZAAR  TAKE 1 TABLET BY MOUTH EVERY DAY     meloxicam 15 MG tablet  Commonly known as:  MOBIC  TAKE 1 TABLET(15 MG) BY MOUTH DAILY AS NEEDED FOR HEADACHE     metFORMIN 500 MG 24 hr tablet  Commonly known as:  GLUCOPHAGE-XR  Take 2 tablets (1,000 mg total) by mouth 2 (two) times daily with meals.     miSOPROStol 200 MCG Tab  Commonly known as:  CYTOTEC  Take 13, 9, 5, 1 hour before procedure     * mometasone 0.1% 0.1 % cream  Commonly known as:  ELOCON     * mometasone 50 mcg/actuation nasal spray  Commonly known as:  NASONEX  2 sprays by Nasal route once daily.     nicotine polacrilex 2 MG Lozg  Take 1 lozenge (2 mg total) by mouth as needed.     QUEtiapine 25 MG Tab  Commonly known as:  SEROQUEL  TAKE 1 TABLET(25 MG) BY MOUTH EVERY EVENING     sumatriptan 50 MG tablet  Commonly known as:  IMITREX  Take 1 tab at onset of headaches.  If no improvement in 2  hours, take another.  Do not take more than 2 tabs in 24 hours.     tiotropium 18 mcg inhalation capsule  Commonly known as:  SPIRIVA  Inhale 1 capsule (18 mcg total) into the lungs once daily. Controller     tiZANidine 4 MG tablet  Commonly known as:  ZANAFLEX  Take 4 mg by mouth every 8 (eight) hours.     topiramate 100 MG tablet  Commonly known as:  TOPAMAX  Take 3 tablets (300 mg total) by mouth 2 (two) times daily.     venlafaxine 75 MG 24 hr capsule  Commonly known as:  EFFEXOR-XR  Take 1 capsule (75 mg total) by mouth every evening.         * This list has 2 medication(s) that are the same as other medications prescribed for you. Read the directions carefully, and ask your doctor or other care provider to review them with you.              Discharge Procedure Orders   Diet general     Call MD for:   Order Comments: Heavy vaginal bleeding saturating more than 1 pad per hour for at least 2 hours.     Call MD for:  extreme fatigue     Call MD for:  persistent dizziness or light-headedness     Call MD for:  hives     Call MD for:  redness, tenderness, or signs of infection (pain, swelling, redness, odor or green/yellow discharge around incision site)     Call MD for:  difficulty breathing, headache or visual disturbances     Call MD for:  severe uncontrolled pain     Call MD for:  persistent nausea and vomiting     Call MD for:  temperature >100.4     No dressing needed     Activity as tolerated        Giovanna Fraser MD  OBGYN PGY-1      Glenn Black MD

## 2018-10-16 NOTE — ANESTHESIA POSTPROCEDURE EVALUATION
"Anesthesia Post Evaluation    Patient: Yanni Kaiser    Procedure(s) Performed: Procedure(s) (LRB):  HYSTEROSCOPY, WITH DILATION AND CURETTAGE OF UTERUS (N/A)    Final Anesthesia Type: general  Patient location during evaluation: PACU  Patient participation: Yes- Able to Participate  Level of consciousness: awake and alert  Post-procedure vital signs: reviewed and stable  Pain management: adequate  Airway patency: patent  PONV status at discharge: nausea (controlled)  Anesthetic complications: no      Cardiovascular status: blood pressure returned to baseline and stable  Respiratory status: unassisted, spontaneous ventilation and room air  Hydration status: euvolemic  Follow-up not needed.        Visit Vitals  BP (!) 156/82   Pulse 79   Temp 36.6 °C (97.8 °F) (Oral)   Resp 18   Ht 5' 4" (1.626 m)   Wt (!) 160.6 kg (353 lb 15.9 oz)   LMP  (Within Months)   SpO2 95%   Breastfeeding? No   BMI 60.76 kg/m²       Pain/Nida Score: Pain Assessment Performed: Yes (10/16/2018  8:45 AM)  Presence of Pain: denies (10/16/2018 11:17 AM)  Nida Score: 9 (10/16/2018 11:17 AM)        "

## 2018-10-19 ENCOUNTER — OFFICE VISIT (OUTPATIENT)
Dept: FAMILY MEDICINE | Facility: CLINIC | Age: 48
End: 2018-10-19
Payer: MEDICARE

## 2018-10-19 VITALS
HEART RATE: 71 BPM | WEIGHT: 293 LBS | TEMPERATURE: 99 F | DIASTOLIC BLOOD PRESSURE: 94 MMHG | BODY MASS INDEX: 50.02 KG/M2 | RESPIRATION RATE: 20 BRPM | SYSTOLIC BLOOD PRESSURE: 143 MMHG | HEIGHT: 64 IN | OXYGEN SATURATION: 96 %

## 2018-10-19 DIAGNOSIS — F17.200 TOBACCO USE DISORDER, MODERATE, DEPENDENCE: ICD-10-CM

## 2018-10-19 DIAGNOSIS — J30.2 SEASONAL ALLERGIES: Primary | ICD-10-CM

## 2018-10-19 DIAGNOSIS — J30.2 SEASONAL ALLERGIES: ICD-10-CM

## 2018-10-19 PROCEDURE — 3077F SYST BP >= 140 MM HG: CPT | Mod: CPTII,,, | Performed by: FAMILY MEDICINE

## 2018-10-19 PROCEDURE — 99999 PR PBB SHADOW E&M-EST. PATIENT-LVL III: CPT | Mod: PBBFAC,,, | Performed by: FAMILY MEDICINE

## 2018-10-19 PROCEDURE — 99213 OFFICE O/P EST LOW 20 MIN: CPT | Mod: PBBFAC,PN | Performed by: FAMILY MEDICINE

## 2018-10-19 PROCEDURE — 3080F DIAST BP >= 90 MM HG: CPT | Mod: CPTII,,, | Performed by: FAMILY MEDICINE

## 2018-10-19 PROCEDURE — 3008F BODY MASS INDEX DOCD: CPT | Mod: CPTII,,, | Performed by: FAMILY MEDICINE

## 2018-10-19 PROCEDURE — 99214 OFFICE O/P EST MOD 30 MIN: CPT | Mod: S$PBB,,, | Performed by: FAMILY MEDICINE

## 2018-10-19 RX ORDER — FLUTICASONE PROPIONATE 50 MCG
1 SPRAY, SUSPENSION (ML) NASAL DAILY
Qty: 16 G | Refills: 5 | Status: SHIPPED | OUTPATIENT
Start: 2018-10-19 | End: 2019-03-13

## 2018-10-19 RX ORDER — MONTELUKAST SODIUM 10 MG/1
10 TABLET ORAL NIGHTLY
Qty: 30 TABLET | Refills: 0 | Status: SHIPPED | OUTPATIENT
Start: 2018-10-19 | End: 2018-10-19 | Stop reason: SDUPTHER

## 2018-10-19 RX ORDER — NAPROXEN 500 MG/1
TABLET ORAL
Refills: 3 | COMMUNITY
Start: 2018-10-02 | End: 2018-11-14

## 2018-10-19 RX ORDER — PANTOPRAZOLE SODIUM 40 MG/1
TABLET, DELAYED RELEASE ORAL
Refills: 5 | COMMUNITY
Start: 2018-10-02 | End: 2019-02-05 | Stop reason: DRUGHIGH

## 2018-10-19 RX ORDER — MONTELUKAST SODIUM 10 MG/1
TABLET ORAL
Qty: 90 TABLET | Refills: 0 | Status: SHIPPED | OUTPATIENT
Start: 2018-10-19 | End: 2019-01-11

## 2018-10-19 NOTE — PROGRESS NOTES
Routine Office Visit    Patient Name: Yanni Kaiser    : 1970  MRN: [id number]    Subjective:  Yanni is a 48 y.o. female who presents today for:    1. Congestion and eye itching  Patient presenting today with 3-4 days of congestion, sneezing, itching, and itchy eyes.  She states that this started after she had a D&C.  There has been no other symptoms.  No wheezing or shortness of breath.  She has been taking benadryl, but no significant relief.  Patient does suffer with asthma and seasonal allergies.  She recently restarted smoking again.    Past Medical History  Past Medical History:   Diagnosis Date    Allergy     Anemia     Asthma     Depression     Diabetes with neurologic complications     GERD (gastroesophageal reflux disease)     High cholesterol     Hyperprolactinemia     Hypertension     Pseudotumor cerebri     Seizures     Sleep apnea        Past Surgical History  Past Surgical History:   Procedure Laterality Date    BREAST CYST ASPIRATION       SECTION      X 1    CHOLECYSTECTOMY      HYSTEROSCOPY WITH DILATION AND CURETTAGE OF UTERUS N/A 10/16/2018    Procedure: HYSTEROSCOPY, WITH DILATION AND CURETTAGE OF UTERUS;  Surgeon: Glenn Black Jr., MD;  Location: [facility];  Service: OB/GYN;  Laterality: N/A;    HYSTEROSCOPY, WITH DILATION AND CURETTAGE OF UTERUS N/A 10/16/2018    Performed by Glenn Black Jr., MD at [facility]    SINUS SURGERY         Family History  Family History   Problem Relation Age of Onset    Cancer Mother     Hypertension Mother     Diabetes Mother     Stroke Father     Heart disease Father     COPD Father     Heart attack Father     Cancer Maternal Grandmother     Breast cancer Maternal Grandmother        Social History  Social History     Socioeconomic History    Marital status:      Spouse name: Not on file    Number of children: Not on file    Years of education: Not on file    Highest education level: Not on file    Social Needs    Financial resource strain: Not on file    Food insecurity - worry: Not on file    Food insecurity - inability: Not on file    Transportation needs - medical: Not on file    Transportation needs - non-medical: Not on file   Occupational History    Not on file   Tobacco Use    Smoking status: Current Every Day Smoker     Packs/day: 1.00     Years: 15.00     Pack years: 15.00     Types: Cigarettes    Smokeless tobacco: Never Used   Substance and Sexual Activity    Alcohol use: No     Alcohol/week: 0.0 oz    Drug use: No    Sexual activity: Yes     Partners: Male     Birth control/protection: None   Other Topics Concern    Not on file   Social History Narrative    Not on file       Current Medications  Current Outpatient Medications on File Prior to Visit   Medication Sig Dispense Refill    acetaZOLAMIDE (DIAMOX) 500 mg CpSR Take 1 capsule (500 mg total) by mouth 2 (two) times daily. 360 capsule 1    albuterol 90 mcg/actuation inhaler Inhale 2 puffs into the lungs every 6 (six) hours as needed for Wheezing. Rescue 1 Inhaler 0    albuterol-ipratropium (DUO-NEB) 2.5 mg-0.5 mg/3 mL nebulizer solution Take 3 mLs by nebulization every 6 (six) hours as needed for Wheezing or Shortness of Breath. Rescue 100 mL 3    blood sugar diagnostic Strp 1 each by Misc.(Non-Drug; Combo Route) route 4 (four) times daily before meals and nightly. ACCU CHEK ELVIA PLUS METER 200 each 3    blood-glucose meter (ACCU-CHEK ELVIA PLUS METER) Misc TEST FOUR TIMES DAILY BEFORE MEALS AND EVERY NIGHT 90 each 1    budesonide-formoterol 160-4.5 mcg (SYMBICORT) 160-4.5 mcg/actuation HFAA Inhale 2 puffs into the lungs every 12 (twelve) hours. Controller 1 Inhaler 5    capsaicin 0.1 % Crea Apply 1 application topically 2 (two) times daily. 56.6 g 1    cetirizine (ZYRTEC) 10 MG tablet Take 1 tablet (10 mg total) by mouth 2 (two) times daily. 60 tablet 3    hydroCHLOROthiazide (HYDRODIURIL) 25 MG tablet Take 1 tablet  (25 mg total) by mouth once daily. 30 tablet 11    hydrOXYzine (ATARAX) 50 MG tablet Take 1-4 at night for itching. 120 tablet 3    lancets (ACCU-CHEK SOFTCLIX LANCETS) Misc 1 Device by Misc.(Non-Drug; Combo Route) route 4 (four) times daily. 200 each 3    LINZESS 290 mcg Cap TK ONE CAPSULE PO D ON AN EMPTY STOMACH  5    losartan (COZAAR) 100 MG tablet TAKE 1 TABLET BY MOUTH EVERY DAY 90 tablet 0    meloxicam (MOBIC) 15 MG tablet TAKE 1 TABLET(15 MG) BY MOUTH DAILY AS NEEDED FOR HEADACHE 90 tablet 1    metformin (GLUCOPHAGE-XR) 500 MG 24 hr tablet Take 2 tablets (1,000 mg total) by mouth 2 (two) times daily with meals. 360 tablet 0    mometasone 0.1% (ELOCON) 0.1 % cream       naproxen (NAPROSYN) 500 MG tablet   3    nicotine polacrilex 2 MG Lozg Take 1 lozenge (2 mg total) by mouth as needed. 168 lozenge 0    oxyCODONE-acetaminophen (PERCOCET) 5-325 mg per tablet Take 1 tablet by mouth every 4 (four) hours as needed for Pain. 10 tablet 0    pantoprazole (PROTONIX) 40 MG tablet TK 1 T PO QD 1 HOUR B MEALS  5    QUEtiapine (SEROQUEL) 25 MG Tab TAKE 1 TABLET(25 MG) BY MOUTH EVERY EVENING 90 tablet 0    sumatriptan (IMITREX) 50 MG tablet Take 1 tab at onset of headaches.  If no improvement in 2 hours, take another.  Do not take more than 2 tabs in 24 hours. 9 tablet 5    tiotropium (SPIRIVA) 18 mcg inhalation capsule Inhale 1 capsule (18 mcg total) into the lungs once daily. Controller 30 capsule 11    tiZANidine (ZANAFLEX) 4 MG tablet Take 4 mg by mouth every 8 (eight) hours.       topiramate (TOPAMAX) 100 MG tablet Take 3 tablets (300 mg total) by mouth 2 (two) times daily. 180 tablet 5    venlafaxine (EFFEXOR-XR) 75 MG 24 hr capsule Take 1 capsule (75 mg total) by mouth every evening. 90 capsule 1    [DISCONTINUED] mometasone (NASONEX) 50 mcg/actuation nasal spray 2 sprays by Nasal route once daily. 1 each 11    [DISCONTINUED] oxyCODONE-acetaminophen (PERCOCET)  mg per tablet 1 tablet every  "6 (six) hours as needed.       [DISCONTINUED] ibuprofen (ADVIL,MOTRIN) 800 MG tablet Take 1 tablet (800 mg total) by mouth every 8 (eight) hours as needed for Pain. 30 tablet 0    [DISCONTINUED] miSOPROStol (CYTOTEC) 200 MCG Tab Take 13, 9, 5, 1 hour before procedure 4 tablet 0     No current facility-administered medications on file prior to visit.        Allergies   Review of patient's allergies indicates:  No Known Allergies    Review of Systems (Pertinent positives)  Review of Systems   Constitutional: Positive for malaise/fatigue.   HENT: Positive for congestion.    Eyes: Negative.    Respiratory: Positive for cough. Negative for hemoptysis, sputum production, shortness of breath and wheezing.    Cardiovascular: Negative.    Gastrointestinal: Negative.    Skin: Positive for itching.         BP (!) 143/94 (BP Location: Left arm, Patient Position: Sitting, BP Method: Medium (Manual))   Pulse 71   Temp 98.7 °F (37.1 °C) (Oral)   Resp 20   Ht 5' 4" (1.626 m)   Wt (!) 169 kg (372 lb 9.2 oz)   SpO2 96%   BMI 63.95 kg/m²     GENERAL APPEARANCE: in no apparent distress and well developed and well nourished  HEENT: PERRL, EOMI, Sclera clear, anicteric, Oropharynx clear, no lesions, Neck supple with midline trachea  NECK: normal, supple, no adenopathy, thyroid normal in size  RESPIRATORY: appears well, vitals normal, no respiratory distress, acyanotic, normal RR, chest clear, no wheezing, crepitations, rhonchi, normal symmetric air entry  HEART: regular rate and rhythm, S1, S2 normal, no murmur, click, rub or gallop.    ABDOMEN: abdomen is soft without tenderness, no masses, no hernias, no organomegaly, no rebound, no guarding. Suprapubic tenderness absent. No CVA tenderness.  SKIN: no rashes, no wounds, no other lesions  PSYCH: Alert, oriented x 3, thought content appropriate, speech normal, pleasant and cooperative, good eye contact, well groomed    Assessment/Plan:  Yanni Kaiser is a 48 y.o. female " who presents today for :    Yanni was seen today for cough and nausea.    Diagnoses and all orders for this visit:    Seasonal allergies  -     fluticasone (FLONASE) 50 mcg/actuation nasal spray; 1 spray (50 mcg total) by Each Nare route once daily.  -     Discontinue: montelukast (SINGULAIR) 10 mg tablet; Take 1 tablet (10 mg total) by mouth every evening.    Tobacco use disorder, moderate, dependence      1.  flonase and singulair for symptom relief  2.  Continue regular inhalers  3.  Educated on the need for smoking cessation  4.  Call with any concerns      Carlyle Gordillo MD

## 2018-10-22 ENCOUNTER — CLINICAL SUPPORT (OUTPATIENT)
Dept: SMOKING CESSATION | Facility: CLINIC | Age: 48
End: 2018-10-22
Payer: COMMERCIAL

## 2018-10-22 DIAGNOSIS — F17.200 NICOTINE DEPENDENCE: Primary | ICD-10-CM

## 2018-10-22 PROCEDURE — 99407 BEHAV CHNG SMOKING > 10 MIN: CPT | Mod: S$GLB,,,

## 2018-10-22 RX ORDER — IBUPROFEN 200 MG
1 TABLET ORAL DAILY
Qty: 28 PATCH | Refills: 0 | Status: SHIPPED | OUTPATIENT
Start: 2018-10-22 | End: 2018-11-21

## 2018-10-24 ENCOUNTER — CLINICAL SUPPORT (OUTPATIENT)
Dept: SMOKING CESSATION | Facility: CLINIC | Age: 48
End: 2018-10-24
Payer: COMMERCIAL

## 2018-10-24 DIAGNOSIS — F17.200 NICOTINE DEPENDENCE: Primary | ICD-10-CM

## 2018-10-24 PROCEDURE — 99404 PREV MED CNSL INDIV APPRX 60: CPT | Mod: S$GLB,,,

## 2018-10-24 PROCEDURE — 99999 PR PBB SHADOW E&M-EST. PATIENT-LVL I: CPT | Mod: PBBFAC,,,

## 2018-10-24 NOTE — PROGRESS NOTES
Individual Follow-Up Form    10/24/2018    Quit Date:     Clinical Status of Patient: Outpatient    Length of Service: 60 minutes    Continuing Medication: yes  Patches    Other Medications: nicotine lozenge     Target Symptoms: Withdrawal and medication side effects. The following were  rated moderate (3) to severe (4) on TCRS:  · Moderate (3): none   · Severe (4): none    Comments: Pt continues to smoke  Pt remains on tobacco cessation medication of  21 mg nicotine patch QD and 2 mg nicotine lozenge PRN (1-2 per hour in place of cigarettes.) Had a long discussion with patient that she has to quit having cigarettes around her.  Discussed her reasons for wanting to quit , again encouraged her to come to group or to have her visit more frequently, discussed strategies that will help her with her habits.  Reviewed coping strategies/habitual behavior/relapse prevention with patient.  Will see pt back in office in 2 weeks and group next week.  She understands she can call me any time.       Diagnosis: F17.200    Next Visit: 2 weeks

## 2018-10-24 NOTE — Clinical Note
Pt continues to smoke  Pt remains on tobacco cessation medication of  21 mg nicotine patch QD and 2 mg nicotine lozenge PRN (1-2 per hour in place of cigarettes.) Had a long discussion with patient that she has to quit having cigarettes around her.  Discussed her reasons for wanting to quit , again encouraged her to come to group or to have her visit more frequently, discussed strategies that will help her with her habits.  Reviewed coping strategies/habitual behavior/relapse prevention with patient.  Will see pt back in office in 2 weeks and group next week.  She understands she can call me any time.

## 2018-10-25 ENCOUNTER — TELEPHONE (OUTPATIENT)
Dept: OBSTETRICS AND GYNECOLOGY | Facility: CLINIC | Age: 48
End: 2018-10-25

## 2018-10-25 RX ORDER — MEDROXYPROGESTERONE ACETATE 10 MG/1
10 TABLET ORAL DAILY
Qty: 12 TABLET | Refills: 12 | Status: SHIPPED | OUTPATIENT
Start: 2018-10-25 | End: 2019-08-19 | Stop reason: ALTCHOICE

## 2018-10-25 NOTE — TELEPHONE ENCOUNTER
----- Message from Glenn Black Jr., MD sent at 10/25/2018 12:37 PM CDT -----  CALL PT WITH PATHOLOGY. SHE NEEDS TO TAKE PROGESTERONE THE SAME 12 DAYS Q MONTH. I SENT RX.

## 2018-10-30 ENCOUNTER — OFFICE VISIT (OUTPATIENT)
Dept: PODIATRY | Facility: CLINIC | Age: 48
End: 2018-10-30
Payer: MEDICARE

## 2018-10-30 VITALS — HEIGHT: 64 IN | BODY MASS INDEX: 50.02 KG/M2 | WEIGHT: 293 LBS

## 2018-10-30 DIAGNOSIS — E11.49 TYPE II DIABETES MELLITUS WITH NEUROLOGICAL MANIFESTATIONS: ICD-10-CM

## 2018-10-30 DIAGNOSIS — M79.673 CHRONIC HEEL PAIN, UNSPECIFIED LATERALITY: ICD-10-CM

## 2018-10-30 DIAGNOSIS — G89.29 CHRONIC HEEL PAIN, UNSPECIFIED LATERALITY: ICD-10-CM

## 2018-10-30 DIAGNOSIS — M72.2 PLANTAR FASCIITIS: Primary | ICD-10-CM

## 2018-10-30 PROCEDURE — 99999 PR PBB SHADOW E&M-EST. PATIENT-LVL IV: CPT | Mod: PBBFAC,,, | Performed by: PODIATRIST

## 2018-10-30 PROCEDURE — 99212 OFFICE O/P EST SF 10 MIN: CPT | Mod: S$PBB,,, | Performed by: PODIATRIST

## 2018-10-30 PROCEDURE — 3008F BODY MASS INDEX DOCD: CPT | Mod: CPTII,,, | Performed by: PODIATRIST

## 2018-10-30 PROCEDURE — 99214 OFFICE O/P EST MOD 30 MIN: CPT | Mod: PBBFAC,PO | Performed by: PODIATRIST

## 2018-10-30 PROCEDURE — 3044F HG A1C LEVEL LT 7.0%: CPT | Mod: CPTII,,, | Performed by: PODIATRIST

## 2018-10-31 NOTE — PROGRESS NOTES
Subjective:      Patient ID: Yanni Kaiser is a 48 y.o. female.    Chief Complaint: Foot Pain (PCP Dr Gordillo) and Foot Problem    Yanni Kaiser is a 48 y.o. femalewho presents to the clinic for evaluation and treatment of high risk feet. Yanni Kaiser   has a past medical history of Allergy, Anemia, Asthma, Depression, Diabetes with neurologic complications, GERD (gastroesophageal reflux disease), High cholesterol, Hyperprolactinemia, Hypertension, Pseudotumor cerebri, Seizures, and Sleep apnea.   The patient's chief complaint is the feeling of swelling to the feet and continuedright heel pain.     8/8/18: Presents for worsening LEFT heel pain. Reports pain to right heel has improved. Currently on percocet prescribed by pain management.     9/20/18: Reports pain is 20% improved to left heel. States she has started wearing tennis shoes. S/p injection LEFT heel. Reports pain to right heel well controlled.     10/30/18: Reports Continued pain to B/L heel L>R. Completed one session of physical therapy however was not able to f/u as her  had recent surgery. Also reports discoloration of skin to site of prior left heel injection.     PCP: Carlyle Gordillo MD    Date Last Seen by PCP:   Chief Complaint   Patient presents with    Foot Pain     PCP Dr Gordillo    Foot Problem       Current shoe gear:   champions boat shoes with inserts     This patient has documented high risk feet requiring routine maintenance secondary to diabetes mellitis and those secondary complications of diabetes, as mentioned..      Hemoglobin A1C   Date Value Ref Range Status   07/30/2018 5.8 (H) 4.0 - 5.6 % Final     Comment:     ADA Screening Guidelines:  5.7-6.4%  Consistent with prediabetes  >or=6.5%  Consistent with diabetes  High levels of fetal hemoglobin interfere with the HbA1C  assay. Heterozygous hemoglobin variants (HbS, HgC, etc)do  not significantly interfere with this assay.   However, presence of multiple  variants may affect accuracy.     01/22/2018 5.8 (H) 4.0 - 5.6 % Final     Comment:     According to ADA guidelines, hemoglobin A1c <7.0% represents  optimal control in non-pregnant diabetic patients. Different  metrics may apply to specific patient populations.   Standards of Medical Care in Diabetes-2016.  For the purpose of screening for the presence of diabetes:  <5.7%     Consistent with the absence of diabetes  5.7-6.4%  Consistent with increasing risk for diabetes   (prediabetes)  >or=6.5%  Consistent with diabetes  Currently, no consensus exists for use of hemoglobin A1c  for diagnosis of diabetes for children.  This Hemoglobin A1c assay has significant interference with fetal   hemoglobin   (HbF). The results are invalid for patients with abnormal amounts of   HbF,   including those with known Hereditary Persistence   of Fetal Hemoglobin. Heterozygous hemoglobin variants (HbAS, HbAC,   HbAD, HbAE, HbA2) do not significantly interfere with this assay;   however, presence of multiple variants in a sample may impact the %   interference.     06/26/2017 6.0 (H) 4.0 - 5.6 % Final     Comment:     According to ADA guidelines, hemoglobin A1c <7.0% represents  optimal control in non-pregnant diabetic patients. Different  metrics may apply to specific patient populations.   Standards of Medical Care in Diabetes-2016.  For the purpose of screening for the presence of diabetes:  <5.7%     Consistent with the absence of diabetes  5.7-6.4%  Consistent with increasing risk for diabetes   (prediabetes)  >or=6.5%  Consistent with diabetes  Currently, no consensus exists for use of hemoglobin A1c  for diagnosis of diabetes for children.  This Hemoglobin A1c assay has significant interference with fetal   hemoglobin   (HbF). The results are invalid for patients with abnormal amounts of   HbF,   including those with known Hereditary Persistence   of Fetal Hemoglobin. Heterozygous hemoglobin variants (HbAS, HbAC,   HbAD, HbAE,  HbA2) do not significantly interfere with this assay;   however, presence of multiple variants in a sample may impact the %   interference.       Patient Active Problem List   Diagnosis    Mild intermittent asthma    SHARATH (obstructive sleep apnea)    Leg varices    Low back pain    Migraine    Morbid obesity with BMI of 60.0-69.9, adult    Tobacco use disorder, moderate, dependence    Exacerbation of asthma    Anemia of chronic disease    Mild protein malnutrition    Weakness    Allergic rhinitis    Idiopathic intracranial hypertension    Essential hypertension    Combined hyperlipidemia associated with type 2 diabetes mellitus    Depression    Hyperlipidemia    GERD (gastroesophageal reflux disease)    Epilepsy    Plantar fasciitis, bilateral    Numbness of right hand    Muscle tightness    Muscle weakness    Type 2 diabetes mellitus with stage 3 chronic kidney disease, with long-term current use of insulin    Recurrent major depressive disorder, in full remission    Simple chronic bronchitis    Other chronic pain    Suicide attempt    Asthma with severe exacerbation    Opioid dependence    Lumbar stenosis    Chest pain    Foot pain, bilateral    Antalgic gait    Dysfunctional uterine bleeding    DUB (dysfunctional uterine bleeding)    S/P D&C (status post dilation and curettage)     Current Outpatient Medications on File Prior to Visit   Medication Sig Dispense Refill    acetaZOLAMIDE (DIAMOX) 500 mg CpSR Take 1 capsule (500 mg total) by mouth 2 (two) times daily. 360 capsule 1    albuterol 90 mcg/actuation inhaler Inhale 2 puffs into the lungs every 6 (six) hours as needed for Wheezing. Rescue 1 Inhaler 0    albuterol-ipratropium (DUO-NEB) 2.5 mg-0.5 mg/3 mL nebulizer solution Take 3 mLs by nebulization every 6 (six) hours as needed for Wheezing or Shortness of Breath. Rescue 100 mL 3    blood sugar diagnostic Strp 1 each by Misc.(Non-Drug; Combo Route) route 4 (four) times  daily before meals and nightly. ACCU CHEK ELVIA PLUS METER 200 each 3    blood-glucose meter (ACCU-CHEK ELVIA PLUS METER) Misc TEST FOUR TIMES DAILY BEFORE MEALS AND EVERY NIGHT 90 each 1    budesonide-formoterol 160-4.5 mcg (SYMBICORT) 160-4.5 mcg/actuation HFAA Inhale 2 puffs into the lungs every 12 (twelve) hours. Controller 1 Inhaler 5    capsaicin 0.1 % Crea Apply 1 application topically 2 (two) times daily. 56.6 g 1    cetirizine (ZYRTEC) 10 MG tablet Take 1 tablet (10 mg total) by mouth 2 (two) times daily. 60 tablet 3    fluticasone (FLONASE) 50 mcg/actuation nasal spray 1 spray (50 mcg total) by Each Nare route once daily. 16 g 5    hydroCHLOROthiazide (HYDRODIURIL) 25 MG tablet Take 1 tablet (25 mg total) by mouth once daily. 30 tablet 11    hydrOXYzine (ATARAX) 50 MG tablet Take 1-4 at night for itching. 120 tablet 3    lancets (ACCU-CHEK SOFTCLIX LANCETS) Misc 1 Device by Misc.(Non-Drug; Combo Route) route 4 (four) times daily. 200 each 3    LINZESS 290 mcg Cap TK ONE CAPSULE PO D ON AN EMPTY STOMACH  5    losartan (COZAAR) 100 MG tablet TAKE 1 TABLET BY MOUTH EVERY DAY 90 tablet 0    medroxyPROGESTERone (PROVERA) 10 MG tablet Take 1 tablet (10 mg total) by mouth once daily. TAKE DAY 1-12 Q MONTH 12 tablet 12    meloxicam (MOBIC) 15 MG tablet TAKE 1 TABLET(15 MG) BY MOUTH DAILY AS NEEDED FOR HEADACHE 90 tablet 1    metformin (GLUCOPHAGE-XR) 500 MG 24 hr tablet Take 2 tablets (1,000 mg total) by mouth 2 (two) times daily with meals. 360 tablet 0    mometasone 0.1% (ELOCON) 0.1 % cream       montelukast (SINGULAIR) 10 mg tablet TAKE 1 TABLET(10 MG) BY MOUTH EVERY EVENING 90 tablet 0    naproxen (NAPROSYN) 500 MG tablet   3    nicotine (NICODERM CQ) 21 mg/24 hr Place 1 patch onto the skin once daily. 28 patch 0    nicotine polacrilex 2 MG Lozg Take 1 lozenge (2 mg total) by mouth as needed. 168 lozenge 0    oxyCODONE-acetaminophen (PERCOCET) 5-325 mg per tablet Take 1 tablet by mouth  every 4 (four) hours as needed for Pain. 10 tablet 0    pantoprazole (PROTONIX) 40 MG tablet TK 1 T PO QD 1 HOUR B MEALS  5    QUEtiapine (SEROQUEL) 25 MG Tab TAKE 1 TABLET(25 MG) BY MOUTH EVERY EVENING 90 tablet 0    sumatriptan (IMITREX) 50 MG tablet Take 1 tab at onset of headaches.  If no improvement in 2 hours, take another.  Do not take more than 2 tabs in 24 hours. 9 tablet 5    tiotropium (SPIRIVA) 18 mcg inhalation capsule Inhale 1 capsule (18 mcg total) into the lungs once daily. Controller 30 capsule 11    tiZANidine (ZANAFLEX) 4 MG tablet Take 4 mg by mouth every 8 (eight) hours.       topiramate (TOPAMAX) 100 MG tablet Take 3 tablets (300 mg total) by mouth 2 (two) times daily. 180 tablet 5    venlafaxine (EFFEXOR-XR) 75 MG 24 hr capsule Take 1 capsule (75 mg total) by mouth every evening. 90 capsule 1     No current facility-administered medications on file prior to visit.      Review of patient's allergies indicates:  No Known Allergies  Past Surgical History:   Procedure Laterality Date    BREAST CYST ASPIRATION       SECTION      X 1    CHOLECYSTECTOMY      HYSTEROSCOPY WITH DILATION AND CURETTAGE OF UTERUS N/A 10/16/2018    Procedure: HYSTEROSCOPY, WITH DILATION AND CURETTAGE OF UTERUS;  Surgeon: Glenn Black Jr., MD;  Location: [facility];  Service: OB/GYN;  Laterality: N/A;    HYSTEROSCOPY, WITH DILATION AND CURETTAGE OF UTERUS N/A 10/16/2018    Performed by Glenn Black Jr., MD at [facility], [place], operated by [facility] OR    SINUS SURGERY       Family History   Problem Relation Age of Onset    Cancer Mother     Hypertension Mother     Diabetes Mother     Stroke Father     Heart disease Father     COPD Father     Heart attack Father     Cancer Maternal Grandmother     Breast cancer Maternal Grandmother      Social History     Socioeconomic History    Marital status:      Spouse name: Not on file    Number of children: Not on file    Years of education: Not on file    Highest education  "level: Not on file   Social Needs    Financial resource strain: Not on file    Food insecurity - worry: Not on file    Food insecurity - inability: Not on file    Transportation needs - medical: Not on file    Transportation needs - non-medical: Not on file   Occupational History    Not on file   Tobacco Use    Smoking status: Current Every Day Smoker     Packs/day: 1.00     Years: 15.00     Pack years: 15.00     Types: Cigarettes    Smokeless tobacco: Never Used   Substance and Sexual Activity    Alcohol use: No     Alcohol/week: 0.0 oz    Drug use: No    Sexual activity: Yes     Partners: Male     Birth control/protection: None   Other Topics Concern    Not on file   Social History Narrative    Not on file        Review of Systems   Constitution: Negative for chills, fever and weakness.   Cardiovascular: Positive for leg swelling. Negative for chest pain and claudication.   Respiratory: Negative for cough and shortness of breath.    Skin: Positive for dry skin and nail changes. Negative for itching and rash.   Musculoskeletal: Positive for arthritis, joint pain, muscle cramps and myalgias. Negative for falls, joint swelling and muscle weakness.   Gastrointestinal: Negative for diarrhea, nausea and vomiting.   Neurological: Positive for numbness and paresthesias. Negative for tremors.   Psychiatric/Behavioral: Negative for altered mental status and hallucinations.           Objective:       Vitals:    10/30/18 1500   Weight: (!) 168.7 kg (372 lb)   Height: 5' 4" (1.626 m)   PainSc: 0-No pain       Physical Exam   Constitutional:   General: Pt. is well-developed, well-nourished, appears stated age, in no acute distress, alert and oriented x 3. No evidence of depression, anxiety, or agitation. Calm, cooperative, and communicative. Appropriate interactions and affect.       Cardiovascular:   Pulses:       Dorsalis pedis pulses are 1+ on the right side, and 1+ on the left side.        Posterior tibial " pulses are 1+ on the right side, and 1+ on the left side.   Dorsalis pedis and posterior tibial pulses are diminished Bilaterally. Toes are cool to touch. Feet are warm proximally.There is decreased digital hair. Skin is atrophic, hyperpigmented, and mildly edematous       Musculoskeletal:        Right ankle: She exhibits decreased range of motion and swelling. No tenderness. No lateral malleolus, no medial malleolus, no CF ligament, no head of 5th metatarsal and no proximal fibula tenderness found. Achilles tendon exhibits no pain and no defect.        Left ankle: She exhibits decreased range of motion and swelling. No tenderness. No lateral malleolus, no medial malleolus, no head of 5th metatarsal and no proximal fibula tenderness found. Achilles tendon exhibits no pain and no defect.        Right foot: There is tenderness (Plantar medial calcaneal tuber. mild pain to palpation of 5th met hase and resistance to eversion). There is normal range of motion.        Left foot: There is tenderness (plantar calcaneal medial tuber. mild pain with palpation of 5th met base and eversion resistance. ). There is normal range of motion.   Biomechanical exam: Pain on palpation bilateral medial calcaneal tubercle at origin of plantar fascia. L>R,  There is equinus deformity bilateral with decreased dorsiflexion at the ankle joint bilateral. No tenderness with compression of heel. Negative tinels sign.     Muscle strength is 5/5 in all groups bilaterally.    Decreased stride, station of gait.  apropulsive toe off.  Increased angle and base of gait.    Patient has hammertoes of digits 2-5 bilateral partially reducible without symptom today.    Decreased first MPJ range of motion both weightbearing and nonweightbearing, no crepitus observed the first MP joint,+ dorsal flag sign. Mild  bunion deformity is observed .    Decreased arch height noted b/l.    Neurological: A sensory deficit is present.   [place] 5.07  monofilamant testing is diminished Maciej feet. Sharp/dull sensation diminished Bilaterally.     Paresthesias, and hyperesthesia bilateral feet with no clearly identified trigger or source.     Skin: Skin is warm, dry and intact. No abrasion, no ecchymosis, no lesion and no rash noted. She is not diaphoretic. No cyanosis or erythema. No pallor. Nails show no clubbing.   Toenails 1-5 bilaterally neatly trimmed and painted thickened by 2-3 mm,     Interdigital Spaces clean, dry and without evidence of break in skin integrity    Small < 1cm fissure noted to plantar medial R heel. Stable without signs of infection. No erythema or drainage noted.     Hyperpigmentation noted to left medial heel. No ulceration noted. No drainage noted.    Psychiatric: She has a normal mood and affect. Her speech is normal.   Nursing note and vitals reviewed.            Assessment:       Encounter Diagnoses   Name Primary?    Plantar fasciitis Yes    Type II diabetes mellitus with neurological manifestations     Chronic heel pain, unspecified laterality          Plan:       Yanni was seen today for foot pain and foot problem.    Diagnoses and all orders for this visit:    Plantar fasciitis    Type II diabetes mellitus with neurological manifestations    Chronic heel pain, unspecified laterality      I counseled the patient on her conditions, their implications and medical management.       - Shoe inspection. Diabetic Foot Education. Patient reminded of the importance of good nutrition and blood sugar control to help prevent podiatric complications of diabetes. Patient instructed on proper foot hygeine. We discussed wearing proper shoe gear, daily foot inspections, never walking without protective shoe gear, never putting sharp instruments to feet.     Discussed different treatment options for foot pain. I gave written and verbal instructions on stretching exercises. Patient expressed understanding. Discussed icing the affected area as  needed and also wearing appropriate shoe gear and avoiding flats, slippers, sandals, and going barefoot. My recommendation for OTC supports is [employer]. Patient instructed on adequate icing techniques. Patient should ice the affected area at least once per day x 10 minutes for 10 days . I advised the patient that extra icing would also be beneficial to ensure adequate anti inflammatory effect. We also discussed cortisone injections and NSAID therapy.     Hyperpigmentation noted to left medial heel at site of prior injection, instructed to continue to monitor area.     CAM boot dispensed and applied to affected foot. Advised to use at all times while weightbearing and ambulating even for few minutes. Advised to use sneaker style shoe in opposite foot to maintain balance. Ok to remove  at night but reapply if patient is to get up in the middle of the night. Verbalized understanding. Advised to use for 3 -4 weeks.     Night splint dispensed.     Continue with PT    RTC in 6 weeks. sooner PRN    Laura Barnes DPM

## 2018-11-05 ENCOUNTER — LAB VISIT (OUTPATIENT)
Dept: LAB | Facility: HOSPITAL | Age: 48
End: 2018-11-05
Payer: MEDICARE

## 2018-11-05 ENCOUNTER — TELEPHONE (OUTPATIENT)
Dept: GASTROENTEROLOGY | Facility: CLINIC | Age: 48
End: 2018-11-05

## 2018-11-05 ENCOUNTER — NUTRITION (OUTPATIENT)
Dept: GASTROENTEROLOGY | Facility: CLINIC | Age: 48
End: 2018-11-05
Payer: MEDICARE

## 2018-11-05 ENCOUNTER — OFFICE VISIT (OUTPATIENT)
Dept: GASTROENTEROLOGY | Facility: CLINIC | Age: 48
End: 2018-11-05
Payer: MEDICARE

## 2018-11-05 VITALS
HEIGHT: 64 IN | HEART RATE: 67 BPM | DIASTOLIC BLOOD PRESSURE: 89 MMHG | BODY MASS INDEX: 50.02 KG/M2 | SYSTOLIC BLOOD PRESSURE: 156 MMHG | WEIGHT: 293 LBS

## 2018-11-05 DIAGNOSIS — D50.9 IRON DEFICIENCY ANEMIA, UNSPECIFIED IRON DEFICIENCY ANEMIA TYPE: ICD-10-CM

## 2018-11-05 DIAGNOSIS — Z80.0 FAMILY HISTORY OF COLON CANCER: ICD-10-CM

## 2018-11-05 DIAGNOSIS — K21.9 GASTROESOPHAGEAL REFLUX DISEASE WITHOUT ESOPHAGITIS: ICD-10-CM

## 2018-11-05 DIAGNOSIS — R68.81 EARLY SATIETY: ICD-10-CM

## 2018-11-05 DIAGNOSIS — R14.0 ABDOMINAL BLOATING: ICD-10-CM

## 2018-11-05 DIAGNOSIS — R13.19 ESOPHAGEAL DYSPHAGIA: ICD-10-CM

## 2018-11-05 DIAGNOSIS — E66.01 MORBID OBESITY WITH BMI OF 60.0-69.9, ADULT: ICD-10-CM

## 2018-11-05 DIAGNOSIS — R07.9 CHEST PAIN, UNSPECIFIED TYPE: Primary | ICD-10-CM

## 2018-11-05 DIAGNOSIS — R11.0 NAUSEA: ICD-10-CM

## 2018-11-05 DIAGNOSIS — K59.09 CONSTIPATION, CHRONIC: ICD-10-CM

## 2018-11-05 DIAGNOSIS — R10.12 LUQ ABDOMINAL PAIN: ICD-10-CM

## 2018-11-05 DIAGNOSIS — K21.9 GASTROESOPHAGEAL REFLUX DISEASE WITHOUT ESOPHAGITIS: Primary | ICD-10-CM

## 2018-11-05 DIAGNOSIS — R07.9 CHEST PAIN, UNSPECIFIED TYPE: ICD-10-CM

## 2018-11-05 LAB
ALBUMIN SERPL BCP-MCNC: 3.4 G/DL
ALP SERPL-CCNC: 64 U/L
ALT SERPL W/O P-5'-P-CCNC: 11 U/L
ANION GAP SERPL CALC-SCNC: 6 MMOL/L
AST SERPL-CCNC: 11 U/L
BASOPHILS # BLD AUTO: 0.1 K/UL
BASOPHILS NFR BLD: 1.9 %
BILIRUB SERPL-MCNC: 0.3 MG/DL
BUN SERPL-MCNC: 13 MG/DL
CALCIUM SERPL-MCNC: 9 MG/DL
CHLORIDE SERPL-SCNC: 105 MMOL/L
CO2 SERPL-SCNC: 24 MMOL/L
CREAT SERPL-MCNC: 0.9 MG/DL
DIFFERENTIAL METHOD: ABNORMAL
EOSINOPHIL # BLD AUTO: 0.8 K/UL
EOSINOPHIL NFR BLD: 14.3 %
ERYTHROCYTE [DISTWIDTH] IN BLOOD BY AUTOMATED COUNT: 16.1 %
EST. GFR  (AFRICAN AMERICAN): >60 ML/MIN/1.73 M^2
EST. GFR  (NON AFRICAN AMERICAN): >60 ML/MIN/1.73 M^2
FERRITIN SERPL-MCNC: 7 NG/ML
GLUCOSE SERPL-MCNC: 93 MG/DL
HCT VFR BLD AUTO: 36.9 %
HGB BLD-MCNC: 11.2 G/DL
IGA SERPL-MCNC: 214 MG/DL
IMM GRANULOCYTES # BLD AUTO: 0 K/UL
IMM GRANULOCYTES NFR BLD AUTO: 0 %
IRON SERPL-MCNC: 31 UG/DL
LYMPHOCYTES # BLD AUTO: 2 K/UL
LYMPHOCYTES NFR BLD: 38.1 %
MCH RBC QN AUTO: 26.8 PG
MCHC RBC AUTO-ENTMCNC: 30.4 G/DL
MCV RBC AUTO: 88 FL
MONOCYTES # BLD AUTO: 0.5 K/UL
MONOCYTES NFR BLD: 8.5 %
NEUTROPHILS # BLD AUTO: 2 K/UL
NEUTROPHILS NFR BLD: 37.2 %
NRBC BLD-RTO: 0 /100 WBC
PLATELET # BLD AUTO: 309 K/UL
PMV BLD AUTO: 9.4 FL
POTASSIUM SERPL-SCNC: 3.6 MMOL/L
PROT SERPL-MCNC: 7.1 G/DL
RBC # BLD AUTO: 4.18 M/UL
SATURATED IRON: 8 %
SODIUM SERPL-SCNC: 135 MMOL/L
TOTAL IRON BINDING CAPACITY: 408 UG/DL
TRANSFERRIN SERPL-MCNC: 276 MG/DL
TSH SERPL DL<=0.005 MIU/L-ACNC: 1.23 UIU/ML
WBC # BLD AUTO: 5.3 K/UL

## 2018-11-05 PROCEDURE — 99205 OFFICE O/P NEW HI 60 MIN: CPT | Mod: S$GLB,,, | Performed by: NURSE PRACTITIONER

## 2018-11-05 PROCEDURE — 3079F DIAST BP 80-89 MM HG: CPT | Mod: CPTII,S$GLB,, | Performed by: NURSE PRACTITIONER

## 2018-11-05 PROCEDURE — 82728 ASSAY OF FERRITIN: CPT

## 2018-11-05 PROCEDURE — 83540 ASSAY OF IRON: CPT

## 2018-11-05 PROCEDURE — 3077F SYST BP >= 140 MM HG: CPT | Mod: CPTII,S$GLB,, | Performed by: NURSE PRACTITIONER

## 2018-11-05 PROCEDURE — 36415 COLL VENOUS BLD VENIPUNCTURE: CPT

## 2018-11-05 PROCEDURE — 85025 COMPLETE CBC W/AUTO DIFF WBC: CPT

## 2018-11-05 PROCEDURE — 84443 ASSAY THYROID STIM HORMONE: CPT

## 2018-11-05 PROCEDURE — 99999 PR PBB SHADOW E&M-EST. PATIENT-LVL IV: CPT | Mod: PBBFAC,,, | Performed by: NURSE PRACTITIONER

## 2018-11-05 PROCEDURE — 80053 COMPREHEN METABOLIC PANEL: CPT

## 2018-11-05 PROCEDURE — 99999 PR PBB SHADOW E&M-EST. PATIENT-LVL I: CPT | Mod: PBBFAC,,,

## 2018-11-05 PROCEDURE — 3008F BODY MASS INDEX DOCD: CPT | Mod: CPTII,S$GLB,, | Performed by: NURSE PRACTITIONER

## 2018-11-05 PROCEDURE — 83516 IMMUNOASSAY NONANTIBODY: CPT

## 2018-11-05 PROCEDURE — 82784 ASSAY IGA/IGD/IGG/IGM EACH: CPT

## 2018-11-05 RX ORDER — ONDANSETRON HYDROCHLORIDE 8 MG/1
8 TABLET, FILM COATED ORAL EVERY 8 HOURS PRN
Qty: 90 TABLET | Refills: 5 | Status: SHIPPED | OUTPATIENT
Start: 2018-11-05 | End: 2019-07-29

## 2018-11-05 NOTE — TELEPHONE ENCOUNTER
GI procedures:  Cardiac evaluation/clearance for chest pain first. Then,    Day 1: EGD/colonoscopy. +/- pyloric botox. 2 nd floor d/t elevated BMI, gastroparesis. 3 days full liquid diet.    TTjeremy NP

## 2018-11-05 NOTE — PATIENT INSTRUCTIONS
Eliminate all forms of dairy/lactose (milk, cheese, butter, creamers, ice cream etc) and artificial sweeteners (splenda, equal, stevia, truvia, crystal lite, diet sodas, sugar free candy/gum etc) from your diet for 14 days to see if the gas and bloating improve.      Take over the counter FDgard for nausea and abdominal pain.     Stop  Linzess and start trulance for constipation.

## 2018-11-05 NOTE — PROGRESS NOTES
[facility] Consultation Note    Reason for Consult:    Chief Complaint   Patient presents with    Heartburn    Chest Pain    Dysphagia    Nausea    Abdominal Pain    Gas    Bloated    Constipation    Weight Loss         PCP:   Carlyle Gordillo   [address] [facility] GASTRO[facility]*    Referring MD:  Flako Trejo Md  [address] [facility]  [address]  [facility]  [place] [address]      HPI:  Yanni Kaiser is a 48 y.o. female with a PMH of anemia, arthritis, back pain, plantar fascitis, asthma,  anxiety, MDD, DM2 , HTN, migraines, seizures, SHARATH referred to motility clinic for second opinion regarding the following problems:    GERD.  Patient reports bothersome heartburn  Retrosternal pyrosis:yes  Regurgitation:yes  Belching:yes  Onset: over 5 years  Frequency: every other day  Improve with: some improvement with protonix 40 mg daily and tums prn. Some improvement with prilosec OTC 40 mg once daily in the past. Has not tried nexium, dexilant, aciphex, unsure if she has tried prevacid.   Upright symptoms: sometimes  Nocturnal symptoms:  usually  Hoarseness:no  Cough:no  Throat clearing:no  Food Triggers:spicy and highly seasoned foods, red gravy.   Caffeine intake:1-2 cans tea daily.   Sleeps with head of the bed elevated.   Avoids eating prior to bedtime.      Chest pain. Reports bothersome chest pain. pressure Does not occur with activity. No SOB. No diaphoresis.   Onset: few weeks  Frequency: every other day       Triggers (cold fluids, caffeine, smoking, ETOH):feels like has to belch  Consumes mostly soft foods and liquids: regular diet  Negative cardiac workup:  none    Improvement with belching  Has not tried:   nifedipine   diltiazem   isosorrbide dinitrate   peppermint oil   sildenafil   trazodone   Botox    Dysphagia.  Reports difficulty swallowing.  Onset of symptoms:5 yrs  Problems with  solids:mostly  Problems with liquids:sometimes  Choking while eating:no  Coughing while eating:no  Location: upper esophagus  Frequency:  Almost daily  Improves with:washing with liquids  History of food impactions requiring ED visit:none  History of allergies:no  History of seasonal allergies:yes  History of food allergies:no  History of eczema:no    Nausea.    Frequency:daily  Onset: over 5 yrs    Early satiety.  Frequency:daily  Onset:over 5 years    No vomiting    Improves with:  Sometimes better with chewing spearmint gum  Inconsistent results with with zofran PRN  Some improvement with phenergan IV  Has not tried compazine    Cannot recall if reglan helped; unable to tolerate d/t hand twitiching  Has not tried domperidone  Has not tried scopolamine patch.      Gastroparesis diagnosed:  Possible 2016 with brandon  Etiology:    Dm-yes type 2  Idopathic  Postsurgical- s/s began after nichole  Parkinsonism-no  Amyloidosis-no  Paraneoplastic disease-no  Scleroderma-no  Mesenteric ischemia-no    History of:  Prior gastric or bariatric surgery: no  Diabetes mellitus: yes  Thyroid dysfunction: no  Neurological disease: no   Autoimmune disorders: no    Number of GP related hospitalization in the past year: none  Number of GP related ED visit in the past year: none    Interventions: (pyloroplasty, botox, gastric stimulator, gastroparesis diet):none    Curenlty on following medications that may affect gastric emptying:   Narcotics (morphine, oxycodone, tapentadol, less with tramadol):percocet 5-325 mg once weekly for planar fasciitis since 2/2018  Anticholinergics: no  Cyclosporine:no  Diabetic medications (GLP-1 analogs, Exenatide, Lixisenatide, Livaglutide, Albiglutide, DulaglutatidePramlintide - SymlinPen (injection)):no    DM 2. Dx 10 year ago. BS running 90-120s. Last hba1c 5.8 in 7/2018 per pcp. Previously followed by endocrinologist. Taking metformin    Abdominal pain. Reports abdominal pain  Character:dull ache  and pressure  Location:LUQ  Frequency: daily   Duration:several hours  Onset:over 5 yrs ago  Worse with:meals  Improves with:nothing in particular  Associated with Bm: no  Nocturnal pain: yes  Has not tried IBgard,  Bentyl, Levsin, Levbid.  Antidepressants:seroquel, effexor  Using narcotic pain medication: percocet  Nsaids: meloxicam 15 mg HS    Gas and bloating.   Bloating: yes  Excessive gas: yes  Abdominal distension: unsure  Symptoms get worse after meals:sometimes  Symptoms get worse as the day progresses:  no  Consumes lactose:tries to avoid, but eat ice cream sometimes  Consumes artificial sugars:none    Constipation.  Reports bothersome constipation.  Lumpy and hard, difficult to pass stools:bristol type 1,7  Urgency and sensation of incomplete evacuation:yes  Straining during defecation:yes  Sensation of anorectal blockage:yes  Rectal prolapse:possibly sometimes  Fewer than three spontaneous bowel movements per week:yes  Frequency:1-2 BM/week  Symptoms started: 5 years ago  Uses manual maneuvers to facilitate defecation:no    Problems in early childhood: no  History of perineal trauma: no    Previous evaluation: no  Previous pelvic muscle retraining: no    No improvement with miralax once daily x couple months  Some  improvement with Linzess 290 mcg daily  No improvement with  Amitiza ?dose? BID  Has not tried Trulance, lactulose, senna, colase, fiber  Takes daily probiotics x 1 month.    Weight fluctuates.     H/o h. Pylori in 2016. Unsure if treated    Anemia. Low hgb.     Fatty liver.     Family history of colon cancer. Mother dx in 50s, MGM dx ?    Anxiety. Depression. Some improvement with seroquel 25 mg HS, also takes effexor 75 mg HS (for HAs). Followed by PCP. Has seen psych in the past.     Insomnia. Not on meds. Diagnosed with SHARATH. Not on cpap/does not like it.     Migraines. Seizures. Some improvement with tompiramate 300 mg  BID, meloxicam 15 mg daily, imitrex 50 mg PRN, also taking effexor 75  mg HS, once monthly shot (?emovig?). Per neruology.    Plantar fascitis. Taking percocet 5-325 mg once weekly since 2/2018. Was on a different narcotic for one month prior to this. Followed by pain management and podietry    Joint pain. Joint swelling. Knees. Hands. Back.  Seen by rheumatology w/ negative work up.    Denies diarrhea, BRBPR, melena    Total visit time was 90 minutes, more than 50% of which was spent in face-to-face counseling with patient regarding symptoms, diagnostic results, prognosis, risks and benefits of treatment options, instructions for management, importance of compliance with chosen treatment options, risk factor reduction, stress reduction, coping strategies.      Previous Studies:   EGD 12/9/16: nl esophagus. Gastric erythema and granularity (gastritis + hpylori). Nl duodenum.   SmartPill 10/5/16: GET delayed 5 hr 50 min, sbtt nl 5 hr 8min, CTT nl 12 hr 13 min, SLBTT nl 19 hr 22 min, WGTT nl 25 hr 13 min  CT abd/pelvis 4/29/16: unremarkable  * colon 2016:?  Abd us 3/11/08: technically limited study. fatty liver. s/p nichole  Abd us 3/5/08: GB stones w/ + bullards sign.     Labs:  10/10/18: hgb low 11.6, RDW high 16.1, BMP unremarkable  8/8/18 : vit d low 12  7/11/18: TSH nl  6/19/18: hgb low 11.3, RDW high 16.8, cl high 113, albumin low 3.1  CCP ab IgG-  RF-  Taylor-  Sjogrens taylor -  Hep A ab IgG+  Hep c ab-  Hep b s ab-  Hep b c ab-  Hep b sag-  Quant gold tb -    Relevant Surgeries:  Cholecystectomy (3/7/2008)    * per pt report    ROS:  ROS   Constitutional: + fever, chills, night sweats, and weight loss  ENT: + congestion, rhinorrhea, and chronic sinus problems  CV: +Chest pain, no palpitations  Pulm: + cough, + shortness of breath  Ophtho: + blurry vision, + eye redness  GI: see HPI  Derm: No rash  Heme: No lymphadenopathy, + bruising  MSK: + joint pain, + joint swelling, no Raynauds  : + dysuria, + frequent urination, no blood in urine  Endo: + hot or cold intolerance  Neuro: No  dizziness, no syncope, no seizure  Psych: No anxiety, + depression        Medical History:   Past Medical History:   Diagnosis Date    Allergy     Anemia     Asthma     Depression     Diabetes with neurologic complications     GERD (gastroesophageal reflux disease)     High cholesterol     Hyperprolactinemia     Hypertension     Pseudotumor cerebri     Seizures     Sleep apnea         Surgical History:   Past Surgical History:   Procedure Laterality Date    BREAST CYST ASPIRATION       SECTION      X 1    CHOLECYSTECTOMY      COLONOSCOPY      HYSTEROSCOPY WITH DILATION AND CURETTAGE OF UTERUS N/A 10/16/2018    Procedure: HYSTEROSCOPY, WITH DILATION AND CURETTAGE OF UTERUS;  Surgeon: Glenn Black Jr., MD;  Location: [facility] OR;  Service: OB/GYN;  Laterality: N/A;    HYSTEROSCOPY, WITH DILATION AND CURETTAGE OF UTERUS N/A 10/16/2018    Performed by Glenn Black Jr., MD at [facility] OR    SINUS SURGERY      UPPER GASTROINTESTINAL ENDOSCOPY          Family History:   Family History   Problem Relation Age of Onset    Hypertension Mother     Diabetes Mother     Colon cancer Mother 50    Colon polyps Mother     Heart disease Father     COPD Father     Heart attack Father     Hypertension Father     Ulcers Father     Cancer Maternal Grandmother     Breast cancer Maternal Grandmother     Colon cancer Maternal Grandmother     Colon polyps Maternal Grandmother     Celiac disease Neg Hx     Cirrhosis Neg Hx     Crohn's disease Neg Hx     Cystic fibrosis Neg Hx     Esophageal cancer Neg Hx     Hemochromatosis Neg Hx     Inflammatory bowel disease Neg Hx     Irritable bowel syndrome Neg Hx     Liver cancer Neg Hx     Liver disease Neg Hx     Rectal cancer Neg Hx     Stomach cancer Neg Hx     Ulcerative colitis Neg Hx     Jonnie's disease Neg Hx     Tuberculosis Neg Hx     Lymphoma Neg Hx     Scleroderma Neg Hx     Rheum arthritis Neg Hx     Melanoma Neg Hx     Multiple  sclerosis Neg Hx     Psoriasis Neg Hx     Lupus Neg Hx     Skin cancer Neg Hx         Social History:   Social History     Socioeconomic History    Marital status:      Spouse name: None    Number of children: None    Years of education: None    Highest education level: None   Social Needs    Financial resource strain: None    Food insecurity - worry: None    Food insecurity - inability: None    Transportation needs - medical: None    Transportation needs - non-medical: None   Occupational History    None   Tobacco Use    Smoking status: Current Every Day Smoker     Packs/day: 1.00     Years: 15.00     Pack years: 15.00     Types: Cigarettes    Smokeless tobacco: Never Used   Substance and Sexual Activity    Alcohol use: No     Alcohol/week: 0.0 oz    Drug use: No    Sexual activity: Yes     Partners: Male     Birth control/protection: None   Other Topics Concern    None   Social History Narrative    None        Review of patient's allergies indicates:  No Known Allergies    Current Outpatient Medications   Medication Sig Dispense Refill    acetaZOLAMIDE (DIAMOX) 500 mg CpSR Take 1 capsule (500 mg total) by mouth 2 (two) times daily. 360 capsule 1    albuterol 90 mcg/actuation inhaler Inhale 2 puffs into the lungs every 6 (six) hours as needed for Wheezing. Rescue 1 Inhaler 0    albuterol-ipratropium (DUO-NEB) 2.5 mg-0.5 mg/3 mL nebulizer solution Take 3 mLs by nebulization every 6 (six) hours as needed for Wheezing or Shortness of Breath. Rescue 100 mL 3    blood sugar diagnostic Strp 1 each by Misc.(Non-Drug; Combo Route) route 4 (four) times daily before meals and nightly. ACCU CHEK ELVIA PLUS METER 200 each 3    blood-glucose meter (ACCU-CHEK ELVIA PLUS METER) Misc TEST FOUR TIMES DAILY BEFORE MEALS AND EVERY NIGHT 90 each 1    budesonide-formoterol 160-4.5 mcg (SYMBICORT) 160-4.5 mcg/actuation HFAA Inhale 2 puffs into the lungs every 12 (twelve) hours. Controller 1 Inhaler 5     capsaicin 0.1 % Crea Apply 1 application topically 2 (two) times daily. 56.6 g 1    cetirizine (ZYRTEC) 10 MG tablet Take 1 tablet (10 mg total) by mouth 2 (two) times daily. 60 tablet 3    fluticasone (FLONASE) 50 mcg/actuation nasal spray 1 spray (50 mcg total) by Each Nare route once daily. 16 g 5    hydroCHLOROthiazide (HYDRODIURIL) 25 MG tablet Take 1 tablet (25 mg total) by mouth once daily. 30 tablet 11    hydrOXYzine (ATARAX) 50 MG tablet Take 1-4 at night for itching. 120 tablet 3    lancets (ACCU-CHEK SOFTCLIX LANCETS) Misc 1 Device by Misc.(Non-Drug; Combo Route) route 4 (four) times daily. 200 each 3    losartan (COZAAR) 100 MG tablet TAKE 1 TABLET BY MOUTH EVERY DAY 90 tablet 0    medroxyPROGESTERone (PROVERA) 10 MG tablet Take 1 tablet (10 mg total) by mouth once daily. TAKE DAY 1-12 Q MONTH 12 tablet 12    meloxicam (MOBIC) 15 MG tablet TAKE 1 TABLET(15 MG) BY MOUTH DAILY AS NEEDED FOR HEADACHE 90 tablet 1    metformin (GLUCOPHAGE-XR) 500 MG 24 hr tablet Take 2 tablets (1,000 mg total) by mouth 2 (two) times daily with meals. 360 tablet 0    mometasone 0.1% (ELOCON) 0.1 % cream       montelukast (SINGULAIR) 10 mg tablet TAKE 1 TABLET(10 MG) BY MOUTH EVERY EVENING 90 tablet 0    nicotine (NICODERM CQ) 21 mg/24 hr Place 1 patch onto the skin once daily. 28 patch 0    nicotine polacrilex 2 MG Lozg Take 1 lozenge (2 mg total) by mouth as needed. 168 lozenge 0    oxyCODONE-acetaminophen (PERCOCET) 5-325 mg per tablet Take 1 tablet by mouth every 4 (four) hours as needed for Pain. 10 tablet 0    pantoprazole (PROTONIX) 40 MG tablet TK 1 T PO QD 1 HOUR B MEALS  5    QUEtiapine (SEROQUEL) 25 MG Tab TAKE 1 TABLET(25 MG) BY MOUTH EVERY EVENING 90 tablet 0    sumatriptan (IMITREX) 50 MG tablet Take 1 tab at onset of headaches.  If no improvement in 2 hours, take another.  Do not take more than 2 tabs in 24 hours. 9 tablet 5    tiotropium (SPIRIVA) 18 mcg inhalation capsule Inhale 1 capsule (18  "mcg total) into the lungs once daily. Controller 30 capsule 11    tiZANidine (ZANAFLEX) 4 MG tablet Take 4 mg by mouth every 8 (eight) hours.       topiramate (TOPAMAX) 100 MG tablet Take 3 tablets (300 mg total) by mouth 2 (two) times daily. 180 tablet 5    venlafaxine (EFFEXOR-XR) 75 MG 24 hr capsule Take 1 capsule (75 mg total) by mouth every evening. 90 capsule 1    naproxen (NAPROSYN) 500 MG tablet   3    ondansetron (ZOFRAN) 8 MG tablet Take 1 tablet (8 mg total) by mouth every 8 (eight) hours as needed for Nausea. 90 tablet 5    plecanatide (TRULANCE) 3 mg Tab Take 3 mg by mouth once daily. 30 tablet 6     No current facility-administered medications for this visit.         Objective Findings:  Vital Signs:  BP (!) 156/89   Pulse 67   Ht 5' 4" (1.626 m)   Wt (!) 168.5 kg (371 lb 7.6 oz)   BMI 63.76 kg/m²   Body mass index is 63.76 kg/m².    Physical Exam:  General appearance: alert, cooperative, no distress  HENT: Normocephalic, atraumatic, neck symmetrical, no nasal discharge  Eyes: conjunctivae/corneas clear, PERRL, EOM's intact  Lungs: clear to auscultation bilaterally, no dullness to percussion bilaterally  Heart: regular rate and rhythm without rub; no displacement of the PMI  Abdomen: soft, non-tender; bowel sounds normoactive; no organomegaly  Extremities: extremities symmetric; no clubbing, cyanosis, or edema  Integument: Skin color, texture, turgor normal; no rashes; hair distrubution normal  Neurologic: Alert and oriented X 3, normal strength, normal coordination and gait  Psychiatric: no pressured speech; normal affect; no evidence of impaired cognition    Labs:  Lab Results   Component Value Date    WBC 5.30 11/05/2018    HGB 11.2 (L) 11/05/2018    HCT 36.9 (L) 11/05/2018    MCV 88 11/05/2018     11/05/2018     Lab Results   Component Value Date    FERRITIN 7 (L) 11/05/2018     Lab Results   Component Value Date     (L) 11/05/2018    K 3.6 11/05/2018     11/05/2018    " CO2 24 11/05/2018    GLU 93 11/05/2018    BUN 13 11/05/2018    CREATININE 0.9 11/05/2018    CALCIUM 9.0 11/05/2018    PROT 7.1 11/05/2018    ALBUMIN 3.4 (L) 11/05/2018    BILITOT 0.3 11/05/2018    ALKPHOS 64 11/05/2018    AST 11 11/05/2018    ALT 11 11/05/2018     Lab Results   Component Value Date    TSH 1.229 11/05/2018     Lab Results   Component Value Date    SEDRATE 55 (H) 08/08/2018     Lab Results   Component Value Date    CRP 11.4 (H) 08/08/2018     Lab Results   Component Value Date    HGBA1C 5.8 (H) 07/30/2018           Assessment and Plan:  Yanni Kaiser is a 48 y.o. female with a PMH of anemia, arthritis, back pain, plantar fascitis, asthma,  anxiety, MDD, DM2 , HTN, migraines, seizures, SHARATH referred to motility clinic for second opinion regarding the following problems:    GERD.    Some improvement with prilosec OTC 40 mg once daily in the past.  -Continue pantoprazole 40 mg daily.   -Will consider increasing PPI to BID  -Check EGD w/ e/g/d bx and pyloric botox. High risk case. 2nd floor procedure due to BMI>50, gastroparesis. To be scheduled after chest pain cleared per cardiology.  -Has not tried nexium, dexilant, aciphex, unsure if she has tried prevacid.      Chest pain. Reports bothersome chest pain. Occurring for a few weeks. No previous cardiac work up.   Improvement with belching  -Referral to cardiology prior to procedures.    Dysphagia.    -EGD    Nausea.  Early satiety. No vomiting. Dm Gastroparesis (smartPill in 2016 with dalayed  gastric emptying).    Is on percocet weekly  Unable to tolerate reglan due to had twitching.   Sometimes better with chewing spearmint gum  Some improvement with phenergan IV  -Start zofran 8 mg TID  -Check labs  -EGD w botox  -Discussed pathophysiology, prognosis, workup and treatment of gastroparesis, including surgical options.  Provided handout.   -Discussed gastroparesis diet, provided handout.    -Referral to GI dietitian  -Has not tried compazine,  domperidone, scopolamine patch        DM type 2. Diagnosed 10 year ago. BS running 90-120s. Last hba1c 5.8 in 7/2018.   Taking metformin 1000 mg BID.   Previously followed by endocrinologist, currently followed by PCP.     Abdominal pain. Nocturnal pain.  On seroquel, effexor  On percocet  On melaxoicam  -Start trulance  -Start OTC FDgard  -Check CT abd/pelvis  -EGD  -Check labs  -Has not tried IBgard, Bentyl, Levsin, Levbid.    Gas and bloating. ?distenition?  Avoids artificial sugars.  -Eliminate lactose  -Will consider send out SIBO testing    Constipation.  Normal colon transit.    On daily probiotic x 1 month.  Will advise to stop at next visit  No improvement with miralax once daily  No improvement with  Amitiza ?dose? BID  -Stop Linzess 290 mcg daily  -Start trulance 3 mg daily. If not covered, will RX linzess 290 mcg daily and miralax 2-4 times daily  -Check labs  -Will consider MRI defecography  -Will consider ARM    Possible prolapse.  -Will consider MRD at next visit     Weight fluctuates.     H/o h. Pylori in 2016. Unsure if treated.  -Check EGD. If biopsies negative for h pylori, will check stools for h pylori off PPI.     Anemia. Low hgb.   -Check EGD/colonoscopy  -Check labs    CT w possible diverticulitis vs epiploic appendagitis. Will start augmentin tid x 10days to treat possible diverticulitis.  This will also cover SIBO.    Pls schedule EGD/colon in >10 weeks to assure healing on diverticulitis    Fatty liver.     Family history of colon cancer. Mother dx in 50s, MGM ?dx ?  -Check colonoscopy    Anxiety. Depression.   Some improvement with seroquel 25 mg HS, also takes effexor 75 mg HS (for HAs).   Has seen psych in the past. Followed by PCP.    Insomnia. Not on meds. Diagnosed with SHARATH. Not on cpap/does not like it.     Migraines. Seizures. Some improvement with tompiramate 300 mg  BID, meloxicam 15 mg daily, imitrex 50 mg PRN, also taking effexor 75 mg HS, and aimovig once monthly Per  neruology.    Plantar fascitis. Taking percocet 5-325 mg once weekly since 2/2018. Was on a different narcotic for one month prior to this.   -Discussed the role of narcotic pain medication in motility and functional gastrointestinal disorders.  Will attempt to limit narcotic use.   -Followed by pain management and podietry    Joint pain. Joint swelling. Knees. Hands. Back. ESR/CRP high  Seen by rheumatology w/ negative work up.    Follow-up in about 3 months (around 2/5/2019) for Motility w/ ARPITA Saldaña.    1. Chest pain, unspecified type    2. Esophageal dysphagia    3. Gastroesophageal reflux disease without esophagitis    4. LUQ abdominal pain    5. Nausea    6. Early satiety    7. Abdominal bloating    8. Constipation, chronic    9. Iron deficiency anemia, unspecified iron deficiency anemia type    10. Family history of colon cancer          Order summary:  Orders Placed This Encounter    CT Abdomen Pelvis W Wo Contrast    TSH    CBC auto differential    Comprehensive metabolic panel    TISSUE TRANSGLUTAMINASE (TTG), IGA    IgA    Iron and TIBC    Ferritin    Ambulatory referral to Cardiology    ondansetron (ZOFRAN) 8 MG tablet    plecanatide (TRULANCE) 3 mg Tab    Case request GI: EGD (ESOPHAGOGASTRODUODENOSCOPY), COLONOSCOPY         Thank you so much for allowing me to participate in the care of Yanni Kaiser          BEENA Carpenter, FNP-C    I have personally reviewed history, performed physical exam, and educated the patient.  I have reviewed and agree with today's findings and the care plan outlined by Ciara Pinto NP.       Jagruti Sweeney MD

## 2018-11-06 ENCOUNTER — OFFICE VISIT (OUTPATIENT)
Dept: CARDIOLOGY | Facility: CLINIC | Age: 48
End: 2018-11-06
Payer: MEDICARE

## 2018-11-06 ENCOUNTER — HOSPITAL ENCOUNTER (OUTPATIENT)
Dept: RADIOLOGY | Facility: HOSPITAL | Age: 48
Discharge: HOME | End: 2018-11-06
Attending: NURSE PRACTITIONER
Payer: MEDICARE

## 2018-11-06 VITALS
RESPIRATION RATE: 15 BRPM | DIASTOLIC BLOOD PRESSURE: 78 MMHG | WEIGHT: 293 LBS | HEART RATE: 82 BPM | BODY MASS INDEX: 50.02 KG/M2 | SYSTOLIC BLOOD PRESSURE: 124 MMHG | HEIGHT: 64 IN | OXYGEN SATURATION: 98 %

## 2018-11-06 DIAGNOSIS — E78.2 COMBINED HYPERLIPIDEMIA ASSOCIATED WITH TYPE 2 DIABETES MELLITUS: ICD-10-CM

## 2018-11-06 DIAGNOSIS — R14.0 ABDOMINAL BLOATING: ICD-10-CM

## 2018-11-06 DIAGNOSIS — K59.09 CONSTIPATION, CHRONIC: ICD-10-CM

## 2018-11-06 DIAGNOSIS — E11.22 TYPE 2 DIABETES MELLITUS WITH STAGE 3 CHRONIC KIDNEY DISEASE, WITH LONG-TERM CURRENT USE OF INSULIN: ICD-10-CM

## 2018-11-06 DIAGNOSIS — I10 ESSENTIAL HYPERTENSION: Chronic | ICD-10-CM

## 2018-11-06 DIAGNOSIS — E66.01 MORBID OBESITY WITH BMI OF 60.0-69.9, ADULT: Chronic | ICD-10-CM

## 2018-11-06 DIAGNOSIS — Z79.4 TYPE 2 DIABETES MELLITUS WITH STAGE 3 CHRONIC KIDNEY DISEASE, WITH LONG-TERM CURRENT USE OF INSULIN: ICD-10-CM

## 2018-11-06 DIAGNOSIS — E11.69 COMBINED HYPERLIPIDEMIA ASSOCIATED WITH TYPE 2 DIABETES MELLITUS: ICD-10-CM

## 2018-11-06 DIAGNOSIS — R07.9 CHEST PAIN, UNSPECIFIED TYPE: ICD-10-CM

## 2018-11-06 DIAGNOSIS — N18.30 TYPE 2 DIABETES MELLITUS WITH STAGE 3 CHRONIC KIDNEY DISEASE, WITH LONG-TERM CURRENT USE OF INSULIN: ICD-10-CM

## 2018-11-06 DIAGNOSIS — R11.0 NAUSEA: ICD-10-CM

## 2018-11-06 DIAGNOSIS — R06.09 DOE (DYSPNEA ON EXERTION): ICD-10-CM

## 2018-11-06 DIAGNOSIS — F17.200 TOBACCO USE DISORDER, MODERATE, DEPENDENCE: ICD-10-CM

## 2018-11-06 DIAGNOSIS — Z01.810 PREOP CARDIOVASCULAR EXAM: Primary | ICD-10-CM

## 2018-11-06 DIAGNOSIS — R68.81 EARLY SATIETY: ICD-10-CM

## 2018-11-06 DIAGNOSIS — R10.12 LUQ ABDOMINAL PAIN: ICD-10-CM

## 2018-11-06 DIAGNOSIS — G47.33 OSA (OBSTRUCTIVE SLEEP APNEA): ICD-10-CM

## 2018-11-06 PROCEDURE — 25500020 PHARM REV CODE 255: Performed by: NURSE PRACTITIONER

## 2018-11-06 PROCEDURE — 99214 OFFICE O/P EST MOD 30 MIN: CPT | Mod: S$GLB,,, | Performed by: INTERNAL MEDICINE

## 2018-11-06 PROCEDURE — 3008F BODY MASS INDEX DOCD: CPT | Mod: CPTII,S$GLB,, | Performed by: INTERNAL MEDICINE

## 2018-11-06 PROCEDURE — 3078F DIAST BP <80 MM HG: CPT | Mod: CPTII,S$GLB,, | Performed by: INTERNAL MEDICINE

## 2018-11-06 PROCEDURE — 3074F SYST BP LT 130 MM HG: CPT | Mod: CPTII,S$GLB,, | Performed by: INTERNAL MEDICINE

## 2018-11-06 PROCEDURE — 74177 CT ABD & PELVIS W/CONTRAST: CPT | Mod: 26,,, | Performed by: RADIOLOGY

## 2018-11-06 PROCEDURE — 74177 CT ABD & PELVIS W/CONTRAST: CPT | Mod: TC

## 2018-11-06 PROCEDURE — 99999 PR PBB SHADOW E&M-EST. PATIENT-LVL III: CPT | Mod: PBBFAC,,, | Performed by: INTERNAL MEDICINE

## 2018-11-06 PROCEDURE — 93000 ELECTROCARDIOGRAM COMPLETE: CPT | Mod: S$GLB,,, | Performed by: INTERNAL MEDICINE

## 2018-11-06 PROCEDURE — 3044F HG A1C LEVEL LT 7.0%: CPT | Mod: CPTII,S$GLB,, | Performed by: INTERNAL MEDICINE

## 2018-11-06 RX ADMIN — IOHEXOL 100 ML: 350 INJECTION, SOLUTION INTRAVENOUS at 12:11

## 2018-11-06 RX ADMIN — IOHEXOL 15 ML: 350 INJECTION, SOLUTION INTRAVENOUS at 12:11

## 2018-11-06 RX ADMIN — IOHEXOL 15 ML: 350 INJECTION, SOLUTION INTRAVENOUS at 11:11

## 2018-11-06 NOTE — PROGRESS NOTES
CARDIOVASCULAR PROGRESS NOTE    REASON FOR CONSULT:   Yanni Kaiser is a 48 y.o. female who presents for eval of CP.    PCP: Page  Req: Millie (GI NP)  HISTORY OF PRESENT ILLNESS:   Last seen by Dr. Rivero 16.  Had neg YAS at that time.    The patient presents at the request of the GI service for evaluation of chest pain.  She is somewhat circuitous in her description of her chest discomfort, but does describe chest pressure occurring over the last month.  This tends to occur both at rest and with exertion, as well as associated with dyspnea.  She does describe significant nausea, although this appears to be related more to food as opposed to exertion.  She denies any palpitations, lightheadedness, dizziness, or syncope.  There has been no PND, orthopnea, lower extremity edema.  She denies melena, hematuria, or claudicant symptoms.    CARDIOVASCULAR HISTORY:   none    PAST MEDICAL HISTORY:     Past Medical History:   Diagnosis Date    Allergy     Anemia     Asthma     Depression     Diabetes with neurologic complications     GERD (gastroesophageal reflux disease)     High cholesterol     Hyperprolactinemia     Hypertension     Pseudotumor cerebri     Seizures     Sleep apnea        PAST SURGICAL HISTORY:     Past Surgical History:   Procedure Laterality Date    BREAST CYST ASPIRATION       SECTION      X 1    CHOLECYSTECTOMY      COLONOSCOPY      HYSTEROSCOPY WITH DILATION AND CURETTAGE OF UTERUS N/A 10/16/2018    Procedure: HYSTEROSCOPY, WITH DILATION AND CURETTAGE OF UTERUS;  Surgeon: Glenn Black Jr., MD;  Location: [facility];  Service: OB/GYN;  Laterality: N/A;    HYSTEROSCOPY, WITH DILATION AND CURETTAGE OF UTERUS N/A 10/16/2018    Performed by Glenn Black Jr., MD at [facility] OR    SINUS SURGERY      UPPER GASTROINTESTINAL ENDOSCOPY         ALLERGIES AND MEDICATION:   Review of patient's allergies indicates:  No Known Allergies     Medication List           Accurate as  of 11/6/18  3:19 PM. If you have any questions, ask your nurse or doctor.               CONTINUE taking these medications    acetaZOLAMIDE 500 mg Cpsr  Commonly known as:  DIAMOX  Take 1 capsule (500 mg total) by mouth 2 (two) times daily.     albuterol 90 mcg/actuation inhaler  Commonly known as:  PROVENTIL/VENTOLIN HFA  Inhale 2 puffs into the lungs every 6 (six) hours as needed for Wheezing. Rescue     albuterol-ipratropium 2.5 mg-0.5 mg/3 mL nebulizer solution  Commonly known as:  DUO-NEB  Take 3 mLs by nebulization every 6 (six) hours as needed for Wheezing or Shortness of Breath. Rescue     blood sugar diagnostic Strp  1 each by Misc.(Non-Drug; Combo Route) route 4 (four) times daily before meals and nightly. ACCU CHEK ELVIA PLUS METER     blood-glucose meter Misc  Commonly known as:  ACCU-CHEK ELVIA PLUS METER  TEST FOUR TIMES DAILY BEFORE MEALS AND EVERY NIGHT     budesonide-formoterol 160-4.5 mcg 160-4.5 mcg/actuation Hfaa  Commonly known as:  SYMBICORT  Inhale 2 puffs into the lungs every 12 (twelve) hours. Controller     capsaicin 0.1 % Crea  Apply 1 application topically 2 (two) times daily.     cetirizine 10 MG tablet  Commonly known as:  ZYRTEC  Take 1 tablet (10 mg total) by mouth 2 (two) times daily.     fluticasone 50 mcg/actuation nasal spray  Commonly known as:  FLONASE  1 spray (50 mcg total) by Each Nare route once daily.     hydroCHLOROthiazide 25 MG tablet  Commonly known as:  HYDRODIURIL  Take 1 tablet (25 mg total) by mouth once daily.     hydrOXYzine 50 MG tablet  Commonly known as:  ATARAX  Take 1-4 at night for itching.     lancets Misc  Commonly known as:  ACCU-CHEK SOFTCLIX LANCETS  1 Device by Misc.(Non-Drug; Combo Route) route 4 (four) times daily.     losartan 100 MG tablet  Commonly known as:  COZAAR  TAKE 1 TABLET BY MOUTH EVERY DAY     medroxyPROGESTERone 10 MG tablet  Commonly known as:  PROVERA  Take 1 tablet (10 mg total) by mouth once daily. TAKE DAY 1-12 Q MONTH     meloxicam  15 MG tablet  Commonly known as:  MOBIC  TAKE 1 TABLET(15 MG) BY MOUTH DAILY AS NEEDED FOR HEADACHE     metFORMIN 500 MG 24 hr tablet  Commonly known as:  GLUCOPHAGE-XR  Take 2 tablets (1,000 mg total) by mouth 2 (two) times daily with meals.     mometasone 0.1% 0.1 % cream  Commonly known as:  ELOCON     montelukast 10 mg tablet  Commonly known as:  SINGULAIR  TAKE 1 TABLET(10 MG) BY MOUTH EVERY EVENING     naproxen 500 MG tablet  Commonly known as:  NAPROSYN     nicotine 21 mg/24 hr  Commonly known as:  NICODERM CQ  Place 1 patch onto the skin once daily.     nicotine polacrilex 2 MG Lozg  Take 1 lozenge (2 mg total) by mouth as needed.     ondansetron 8 MG tablet  Commonly known as:  ZOFRAN  Take 1 tablet (8 mg total) by mouth every 8 (eight) hours as needed for Nausea.     oxyCODONE-acetaminophen 5-325 mg per tablet  Commonly known as:  PERCOCET  Take 1 tablet by mouth every 4 (four) hours as needed for Pain.     pantoprazole 40 MG tablet  Commonly known as:  PROTONIX     plecanatide 3 mg Tab  Commonly known as:  TRULANCE  Take 3 mg by mouth once daily.     QUEtiapine 25 MG Tab  Commonly known as:  SEROQUEL  TAKE 1 TABLET(25 MG) BY MOUTH EVERY EVENING     sumatriptan 50 MG tablet  Commonly known as:  IMITREX  Take 1 tab at onset of headaches.  If no improvement in 2 hours, take another.  Do not take more than 2 tabs in 24 hours.     tiotropium 18 mcg inhalation capsule  Commonly known as:  SPIRIVA  Inhale 1 capsule (18 mcg total) into the lungs once daily. Controller     tiZANidine 4 MG tablet  Commonly known as:  ZANAFLEX     topiramate 100 MG tablet  Commonly known as:  TOPAMAX  Take 3 tablets (300 mg total) by mouth 2 (two) times daily.     venlafaxine 75 MG 24 hr capsule  Commonly known as:  EFFEXOR-XR  Take 1 capsule (75 mg total) by mouth every evening.            SOCIAL HISTORY:     Social History     Socioeconomic History    Marital status:      Spouse name: Not on file    Number of children:  Not on file    Years of education: Not on file    Highest education level: Not on file   Social Needs    Financial resource strain: Not on file    Food insecurity - worry: Not on file    Food insecurity - inability: Not on file    Transportation needs - medical: Not on file    Transportation needs - non-medical: Not on file   Occupational History    Not on file   Tobacco Use    Smoking status: Current Every Day Smoker     Packs/day: 1.00     Years: 15.00     Pack years: 15.00     Types: Cigarettes    Smokeless tobacco: Never Used   Substance and Sexual Activity    Alcohol use: No     Alcohol/week: 0.0 oz    Drug use: No    Sexual activity: Yes     Partners: Male     Birth control/protection: None   Other Topics Concern    Not on file   Social History Narrative    Not on file       FAMILY HISTORY:     Family History   Problem Relation Age of Onset    Hypertension Mother     Diabetes Mother     Colon cancer Mother 50    Colon polyps Mother     Heart disease Father     COPD Father     Heart attack Father     Hypertension Father     Ulcers Father     Cancer Maternal Grandmother     Breast cancer Maternal Grandmother     Colon cancer Maternal Grandmother     Colon polyps Maternal Grandmother     Celiac disease Neg Hx     Cirrhosis Neg Hx     Crohn's disease Neg Hx     Cystic fibrosis Neg Hx     Esophageal cancer Neg Hx     Hemochromatosis Neg Hx     Inflammatory bowel disease Neg Hx     Irritable bowel syndrome Neg Hx     Liver cancer Neg Hx     Liver disease Neg Hx     Rectal cancer Neg Hx     Stomach cancer Neg Hx     Ulcerative colitis Neg Hx     Jonnie's disease Neg Hx     Tuberculosis Neg Hx     Lymphoma Neg Hx     Scleroderma Neg Hx     Rheum arthritis Neg Hx     Melanoma Neg Hx     Multiple sclerosis Neg Hx     Psoriasis Neg Hx     Lupus Neg Hx     Skin cancer Neg Hx        REVIEW OF SYSTEMS:   Review of Systems   Constitutional: Negative for chills, diaphoresis  "and fever.   HENT: Negative for nosebleeds.    Eyes: Negative for blurred vision, double vision and photophobia.   Respiratory: Positive for shortness of breath. Negative for hemoptysis and wheezing.    Cardiovascular: Positive for chest pain. Negative for palpitations, orthopnea, claudication, leg swelling and PND.   Gastrointestinal: Negative for abdominal pain, blood in stool, heartburn, melena, nausea and vomiting.   Genitourinary: Negative for flank pain and hematuria.   Musculoskeletal: Negative for falls, myalgias and neck pain.   Skin: Negative for rash.   Neurological: Negative for dizziness, seizures, loss of consciousness, weakness and headaches.   Endo/Heme/Allergies: Negative for polydipsia. Does not bruise/bleed easily.   Psychiatric/Behavioral: Negative for depression and memory loss. The patient is not nervous/anxious.        PHYSICAL EXAM:     Vitals:    11/06/18 1502   BP: 124/78   Pulse: 82   Resp: 15    Body mass index is 63.08 kg/m².  Weight: (!) 166.7 kg (367 lb 8.1 oz)   Height: 5' 4" (162.6 cm)     Physical Exam   Constitutional: She is oriented to person, place, and time. She appears well-developed and well-nourished. She is cooperative.  Non-toxic appearance. No distress.   HENT:   Head: Normocephalic and atraumatic.   Eyes: Conjunctivae and EOM are normal. Pupils are equal, round, and reactive to light. No scleral icterus.   Neck: Trachea normal and normal range of motion. Neck supple. Normal carotid pulses and no JVD present. Carotid bruit is not present. No neck rigidity. No tracheal deviation and no edema present. No thyromegaly present.   Cardiovascular: Normal rate, regular rhythm, S1 normal and S2 normal. PMI is not displaced. Exam reveals no gallop and no friction rub.   No murmur heard.  Pulses:       Carotid pulses are 2+ on the right side, and 2+ on the left side.  Pulmonary/Chest: Effort normal and breath sounds normal. No stridor. No respiratory distress. She has no wheezes. " She has no rales. She exhibits no tenderness.   Abdominal: Soft. She exhibits no distension. There is no hepatosplenomegaly.   obese   Musculoskeletal: She exhibits edema. She exhibits no tenderness.   Feet:   Right Foot:   Skin Integrity: Negative for ulcer.   Left Foot:   Skin Integrity: Negative for ulcer.   Neurological: She is alert and oriented to person, place, and time. No cranial nerve deficit.   Skin: Skin is warm and dry. No rash noted. No erythema.   Psychiatric: She has a normal mood and affect. Her speech is normal and behavior is normal.   Vitals reviewed.      DATA:   EKG: (personally reviewed tracing)  11/6/18 SR 82, PRWP, NSSTTW changes, similar to 9/4/18    Laboratory:  CBC:  Recent Labs   Lab 09/04/18  1030 10/10/18  0953 11/05/18  1205   WHITE BLOOD CELL COUNT 6.32 3.49 L 5.30   HEMOGLOBIN 11.7 L 11.6 L 11.2 L   HEMATOCRIT 36.5 L 38.0 36.9 L   PLATELETS 289 319 309       CHEMISTRIES:  Recent Labs   Lab 07/29/16  1438 07/30/16  0534  04/15/17  1455  09/04/18  1030 10/10/18  0953 11/05/18  1205   GLUCOSE 113 H 99   < > 98   < > 212 H 101 93   SODIUM 137 137   < > 142   < > 137 139 135 L   POTASSIUM 3.6 3.3 L   < > 3.9   < > 4.1 3.7 3.6   BUN BLD 10 10   < > 8   < > 10 12 13   CREATININE 1.1 1.1   < > 0.9   < > 1.1 1.1 0.9   EGFR IF AFRICAN AMERICAN >60 >60   < > >60   < > >60 >60 >60.0   EGFR IF NON- >60 >60   < > >60   < > >60 >60 >60.0   CALCIUM 9.2 8.9   < > 8.9   < > 8.7 8.8 9.0   MAGNESIUM 2.1 2.1  --  2.0  --   --   --   --     < > = values in this interval not displayed.       CARDIAC BIOMARKERS:  Recent Labs   Lab 07/30/16  0534 07/30/16  1313 10/24/16  0331 04/05/18  0950 08/08/18  1149 09/04/18  1030   CPK  --  182 H  --  96 122  --    TROPONIN I <0.006  --  <0.006  --   --  <0.006       COAGS:  Recent Labs   Lab 04/28/16  1525 07/29/16  1438 10/24/16  0331   INR 1.0 1.0 1.0       LIPIDS/LFTS:  Recent Labs   Lab 07/30/16  0534  09/21/17  0920 01/22/18  0942   06/19/18  0655 08/08/18  1149 11/05/18  1205   CHOLESTEROL 145  --  228 H 201 H  --   --   --   --    TRIGLYCERIDES 65  --  101 104  --   --   --   --    HDL 52  --  51 56  --   --   --   --    LDL CHOLESTEROL 80.0  --  156.8 124.2  --   --   --   --    NON-HDL CHOLESTEROL 93  --  177 145  --   --   --   --    AST 12   < >  --   --    < > 11 9 L 11   ALT 12   < >  --   --    < > 11 9 L 11    < > = values in this interval not displayed.       Cardiovascular Testing:  DSE 4/29/16  The patient received a graduated infusion of Dobutamine beginning at 10.00 mcg/Kg/min to a peak dose of 40 mcg/Kg/min, achieving a peak heart rate of 151 bpm, which is 90% of the age predicted maximum heart rate. The patient also received a total of 0.85 mg of Atropine.   Peak Imaging:  Images taken at peak infusion showed left ventricular function augmenting.   No dobutamine induced wall motion abnormalities were identified.   CONCLUSIONS     1 - Normal left ventricular systolic function (EF 60-65%).     2 - The estimated PA systolic pressure is 36 mmHg.     3 - Trivial mitral regurgitation.     4 - Trivial tricuspid regurgitation.     5 - Trivial pulmonic regurgitation.   No evidence of stress induced myocardial ischemia.    RC 3/12/18  #1. Right RC: 1.09. This is within normal limits.  #2. Left RC:1.04. This is within normal limits.    ASSESSMENT:   # preop for EGD  # CP, atyp  # NOWAK  # preop for EGD  # HTN, controlled  # DM  # BMI 63  # tob abuse, currently in smoking cessation class    PLAN:   Cont med rx  Mei MPI  Echo  Consider statin rx given hx of DM  Consider SHARATH eval if not performed in past  Diet/exericse/weight loss, ?bariatric eval  RTC 1 month for review of above  No prohibitive cardiac contraindication to EGD.  Above testing does not need to delay EGD procedure if deemed urgent.      Abdiaziz Flores MD, FACC

## 2018-11-06 NOTE — LETTER
November 6, 2018      Carlyle Gordillo MD  [address]  [place] [address]           [facility]  [address]  [place] [address]  Phone: [phone]          Patient: Yanni Kaiser   MR Number: [id number]   YOB: 1970   Date of Visit: 11/6/2018       Dear Ciara Pinto:    Thank you for referring Yanni Kaiser to me for evaluation. Attached you will find relevant portions of my assessment and plan of care.    If you have questions, please do not hesitate to call me. I look forward to following Yanni Kaiser along with you.    Sincerely,    Abdiaziz Flores MD    Enclosure  CC:  Ciara Pinto NP    If you would like to receive this communication electronically, please contact [email or web address] or [phone] to request more information on [employer] access.    For providers and/or their staff who would like to refer a patient to [facility], please contact us through our one-stop-shop provider referral line, [facility] , at [phone].    If you feel you have received this communication in error or would no longer like to receive these types of communications, please e-mail [email or web address]

## 2018-11-07 LAB — TTG IGA SER-ACNC: 5 UNITS

## 2018-11-09 ENCOUNTER — TELEPHONE (OUTPATIENT)
Dept: GASTROENTEROLOGY | Facility: CLINIC | Age: 48
End: 2018-11-09

## 2018-11-09 DIAGNOSIS — Z12.11 SCREEN FOR COLON CANCER: Primary | ICD-10-CM

## 2018-11-09 DIAGNOSIS — Z12.11 COLON CANCER SCREENING: ICD-10-CM

## 2018-11-09 RX ORDER — AMOXICILLIN AND CLAVULANATE POTASSIUM 500; 125 MG/1; MG/1
1 TABLET, FILM COATED ORAL 3 TIMES DAILY
Qty: 30 TABLET | Refills: 0 | Status: SHIPPED | OUTPATIENT
Start: 2018-11-09 | End: 2018-11-19

## 2018-11-09 RX ORDER — POLYETHYLENE GLYCOL 3350, SODIUM SULFATE ANHYDROUS, SODIUM BICARBONATE, SODIUM CHLORIDE, POTASSIUM CHLORIDE 236; 22.74; 6.74; 5.86; 2.97 G/4L; G/4L; G/4L; G/4L; G/4L
4 POWDER, FOR SOLUTION ORAL ONCE
Qty: 4000 ML | Refills: 0 | Status: SHIPPED | OUTPATIENT
Start: 2018-11-09 | End: 2018-11-09

## 2018-11-09 NOTE — TELEPHONE ENCOUNTER
CT w possible diverticulitis vs epiploic appendagitis.     Spoke w pt.  She reports chronic abd pain.  Mostly LUQ.  Some diffuse pain.  Paint became more intense and frequent 2-4 months ago.  No fever.  Recently developed chills.      Will start augmentin tid x 10days to treat possible diverticulitis.  This will also cover SIBO.      Pls schedule EGD/colon in >10 weeks to assure healing on diverticulitis

## 2018-11-11 DIAGNOSIS — J30.2 SEASONAL ALLERGIES: ICD-10-CM

## 2018-11-12 ENCOUNTER — NUTRITION (OUTPATIENT)
Dept: GASTROENTEROLOGY | Facility: CLINIC | Age: 48
End: 2018-11-12
Payer: MEDICARE

## 2018-11-12 VITALS — HEIGHT: 64 IN | BODY MASS INDEX: 50.02 KG/M2 | WEIGHT: 293 LBS

## 2018-11-12 DIAGNOSIS — R68.81 EARLY SATIETY: ICD-10-CM

## 2018-11-12 DIAGNOSIS — Z79.4 TYPE 2 DIABETES MELLITUS WITH STAGE 3 CHRONIC KIDNEY DISEASE, WITH LONG-TERM CURRENT USE OF INSULIN: ICD-10-CM

## 2018-11-12 DIAGNOSIS — E66.01 MORBID OBESITY WITH BMI OF 60.0-69.9, ADULT: ICD-10-CM

## 2018-11-12 DIAGNOSIS — F32.A DEPRESSION, UNSPECIFIED DEPRESSION TYPE: ICD-10-CM

## 2018-11-12 DIAGNOSIS — K21.9 GASTROESOPHAGEAL REFLUX DISEASE WITHOUT ESOPHAGITIS: Primary | ICD-10-CM

## 2018-11-12 DIAGNOSIS — E11.22 TYPE 2 DIABETES MELLITUS WITH STAGE 3 CHRONIC KIDNEY DISEASE, WITH LONG-TERM CURRENT USE OF INSULIN: ICD-10-CM

## 2018-11-12 DIAGNOSIS — K59.09 CONSTIPATION, CHRONIC: ICD-10-CM

## 2018-11-12 DIAGNOSIS — N18.30 TYPE 2 DIABETES MELLITUS WITH STAGE 3 CHRONIC KIDNEY DISEASE, WITH LONG-TERM CURRENT USE OF INSULIN: ICD-10-CM

## 2018-11-12 DIAGNOSIS — R14.0 ABDOMINAL BLOATING: ICD-10-CM

## 2018-11-12 DIAGNOSIS — R13.19 ESOPHAGEAL DYSPHAGIA: ICD-10-CM

## 2018-11-12 DIAGNOSIS — D50.9 IRON DEFICIENCY ANEMIA, UNSPECIFIED IRON DEFICIENCY ANEMIA TYPE: ICD-10-CM

## 2018-11-12 PROCEDURE — 99999 PR PBB SHADOW E&M-EST. PATIENT-LVL II: CPT | Mod: PBBFAC,,,

## 2018-11-12 RX ORDER — MONTELUKAST SODIUM 10 MG/1
TABLET ORAL
Qty: 30 TABLET | Refills: 0 | Status: SHIPPED | OUTPATIENT
Start: 2018-11-12 | End: 2018-12-08 | Stop reason: SDUPTHER

## 2018-11-12 NOTE — PROGRESS NOTES
Referring Provider :  Jagruti Sweeney MD   Patient 20 minutes late for 45 min appt   Type of Visit : an initial evaluation  Reason for Visit:  GERD and gastroparesis and reports difficulty with swallowing dry foods but sounds more like combination of mouth drying effects of meds , inadequate fluid intake and choice of dry foods - chips for snacks     Clinical Signs and Symptoms:   reflux symptoms, nausea and delayed gastri emptying     Bowel :   constipated    Work up:   GERD.  Patient reports bothersome heartburn  Retrosternal pyrosis:yes  Regurgitation:yes  Belching:yes  Onset: over 5 years  Frequency: every other day  Improve with: some improvement with protonix 40 mg daily and tums prn. Some improvement with prilosec OTC 40 mg once daily in the past. Has not tried nexium, dexilant, aciphex, unsure if she has tried prevacid.   Upright symptoms: sometimes  Nocturnal symptoms:  usually  Hoarseness:no  Cough:no  Throat clearing:no  Food Triggers:spicy and highly seasoned foods, red gravy.   Caffeine intake:1-2 cans tea daily.   Sleeps with head of the bed elevated.   Avoids eating prior to bedtime.       Chest pain. Reports bothersome chest pain. pressure Does not occur with activity. No SOB. No diaphoresis.   Onset: few weeks  Frequency: every other day       Triggers (cold fluids, caffeine, smoking, ETOH):feels like has to belch  Consumes mostly soft foods and liquids: regular diet  Negative cardiac workup:  none     Improvement with belching  Has not tried:   nifedipine   diltiazem   isosorrbide dinitrate   peppermint oil   sildenafil   trazodone   Botox     Dysphagia.  Reports difficulty swallowing.  Onset of symptoms:5 yrs  Problems with solids:mostly  Problems with liquids:sometimes  Choking while eating:no  Coughing while eating:no  Location: upper esophagus  Frequency:  Almost daily  Improves with:washing with liquids  History of food impactions requiring ED visit:none  History of allergies:no  History of  seasonal allergies:yes  History of food allergies:no  History of eczema:no     Nausea.    Frequency:daily  Onset: over 5 yrs     Early satiety.  Frequency:daily  Onset:over 5 years     No vomiting     Improves with:  Sometimes better with chewing spearmint gum  Inconsistent results with with zofran PRN  Some improvement with phenergan IV  Has not tried compazine    Cannot recall if reglan helped; unable to tolerate d/t hand twitiching  Has not tried domperidone  Has not tried scopolamine patch.       Gastroparesis diagnosed:  Possible 2016 with brandon  Etiology:    Dm-yes type 2  Idopathic  Postsurgical- s/s began after nichole  Parkinsonism-no  Amyloidosis-no  Paraneoplastic disease-no  Scleroderma-no  Mesenteric ischemia-no     History of:  Prior gastric or bariatric surgery: no  Diabetes mellitus: yes  Thyroid dysfunction: no  Neurological disease: no   Autoimmune disorders: no       Pertinent Social History:  Marital Status:   Occupation: disabled due to seizure disorder   Activity/nutrition: limited by plantar fasciitis for which she is wearing a surgical boot today   Living situation: with family , daughter, 3 grandchildren for whom she cares (3yrs, 18 mo, 23 months )   Tobacco/alcohol/caffeine: caffeine intake: 2 cans/bottles of caffeinated soda pop per day(s)    Previous Medical History:  Past Medical History:   Diagnosis Date    Allergy     Anemia     Asthma     Depression     Diabetes with neurologic complications     GERD (gastroesophageal reflux disease)     High cholesterol     Hyperprolactinemia     Hypertension     Pseudotumor cerebri     Seizures     Sleep apnea        Previous Surgical History:  Past Surgical History:   Procedure Laterality Date    BREAST CYST ASPIRATION       SECTION      X 1    CHOLECYSTECTOMY      COLONOSCOPY      HYSTEROSCOPY WITH DILATION AND CURETTAGE OF UTERUS N/A 10/16/2018    Procedure: HYSTEROSCOPY, WITH DILATION AND CURETTAGE OF  UTERUS;  Surgeon: Glenn Black Jr., MD;  Location: [facility] OR;  Service: OB/GYN;  Laterality: N/A;    HYSTEROSCOPY, WITH DILATION AND CURETTAGE OF UTERUS N/A 10/16/2018    Performed by Glenn Black Jr., MD at [facility] OR    SINUS SURGERY      UPPER GASTROINTESTINAL ENDOSCOPY         Medication:    Current Outpatient Medications:     acetaZOLAMIDE (DIAMOX) 500 mg CpSR, Take 1 capsule (500 mg total) by mouth 2 (two) times daily., Disp: 360 capsule, Rfl: 1    albuterol 90 mcg/actuation inhaler, Inhale 2 puffs into the lungs every 6 (six) hours as needed for Wheezing. Rescue, Disp: 1 Inhaler, Rfl: 0    albuterol-ipratropium (DUO-NEB) 2.5 mg-0.5 mg/3 mL nebulizer solution, Take 3 mLs by nebulization every 6 (six) hours as needed for Wheezing or Shortness of Breath. Rescue, Disp: 100 mL, Rfl: 3    amoxicillin-clavulanate 500-125mg (AUGMENTIN) 500-125 mg Tab, Take 1 tablet (500 mg total) by mouth 3 (three) times daily. for 10 days, Disp: 30 tablet, Rfl: 0    blood sugar diagnostic Strp, 1 each by Misc.(Non-Drug; Combo Route) route 4 (four) times daily before meals and nightly. ACCU CHEK ELVIA PLUS METER, Disp: 200 each, Rfl: 3    blood-glucose meter (ACCU-CHEK ELVIA PLUS METER) Misc, TEST FOUR TIMES DAILY BEFORE MEALS AND EVERY NIGHT, Disp: 90 each, Rfl: 1    budesonide-formoterol 160-4.5 mcg (SYMBICORT) 160-4.5 mcg/actuation HFAA, Inhale 2 puffs into the lungs every 12 (twelve) hours. Controller, Disp: 1 Inhaler, Rfl: 5    capsaicin 0.1 % Crea, Apply 1 application topically 2 (two) times daily., Disp: 56.6 g, Rfl: 1    cetirizine (ZYRTEC) 10 MG tablet, Take 1 tablet (10 mg total) by mouth 2 (two) times daily., Disp: 60 tablet, Rfl: 3    fluticasone (FLONASE) 50 mcg/actuation nasal spray, 1 spray (50 mcg total) by Each Nare route once daily., Disp: 16 g, Rfl: 5    hydroCHLOROthiazide (HYDRODIURIL) 25 MG tablet, Take 1 tablet (25 mg total) by mouth once daily., Disp: 30 tablet, Rfl: 11    hydrOXYzine (ATARAX)  50 MG tablet, Take 1-4 at night for itching., Disp: 120 tablet, Rfl: 3    lancets (ACCU-CHEK SOFTCLIX LANCETS) Misc, 1 Device by Misc.(Non-Drug; Combo Route) route 4 (four) times daily., Disp: 200 each, Rfl: 3    losartan (COZAAR) 100 MG tablet, TAKE 1 TABLET BY MOUTH EVERY DAY, Disp: 90 tablet, Rfl: 0    medroxyPROGESTERone (PROVERA) 10 MG tablet, Take 1 tablet (10 mg total) by mouth once daily. TAKE DAY 1-12 Q MONTH, Disp: 12 tablet, Rfl: 12    meloxicam (MOBIC) 15 MG tablet, TAKE 1 TABLET(15 MG) BY MOUTH DAILY AS NEEDED FOR HEADACHE, Disp: 90 tablet, Rfl: 1    metformin (GLUCOPHAGE-XR) 500 MG 24 hr tablet, Take 2 tablets (1,000 mg total) by mouth 2 (two) times daily with meals., Disp: 360 tablet, Rfl: 0    mometasone 0.1% (ELOCON) 0.1 % cream, , Disp: , Rfl:     montelukast (SINGULAIR) 10 mg tablet, TAKE 1 TABLET(10 MG) BY MOUTH EVERY EVENING, Disp: 90 tablet, Rfl: 0    montelukast (SINGULAIR) 10 mg tablet, TAKE 1 TABLET(10 MG) BY MOUTH EVERY EVENING, Disp: 30 tablet, Rfl: 0    naproxen (NAPROSYN) 500 MG tablet, , Disp: , Rfl: 3    nicotine (NICODERM CQ) 21 mg/24 hr, Place 1 patch onto the skin once daily., Disp: 28 patch, Rfl: 0    nicotine polacrilex 2 MG Lozg, Take 1 lozenge (2 mg total) by mouth as needed., Disp: 168 lozenge, Rfl: 0    ondansetron (ZOFRAN) 8 MG tablet, Take 1 tablet (8 mg total) by mouth every 8 (eight) hours as needed for Nausea., Disp: 90 tablet, Rfl: 5    oxyCODONE-acetaminophen (PERCOCET) 5-325 mg per tablet, Take 1 tablet by mouth every 4 (four) hours as needed for Pain., Disp: 10 tablet, Rfl: 0    pantoprazole (PROTONIX) 40 MG tablet, TK 1 T PO QD 1 HOUR B MEALS, Disp: , Rfl: 5    plecanatide (TRULANCE) 3 mg Tab, Take 3 mg by mouth once daily., Disp: 30 tablet, Rfl: 6    QUEtiapine (SEROQUEL) 25 MG Tab, TAKE 1 TABLET(25 MG) BY MOUTH EVERY EVENING, Disp: 90 tablet, Rfl: 0    sumatriptan (IMITREX) 50 MG tablet, Take 1 tab at onset of headaches.  If no improvement in 2  hours, take another.  Do not take more than 2 tabs in 24 hours., Disp: 9 tablet, Rfl: 5    tiotropium (SPIRIVA) 18 mcg inhalation capsule, Inhale 1 capsule (18 mcg total) into the lungs once daily. Controller, Disp: 30 capsule, Rfl: 11    tiZANidine (ZANAFLEX) 4 MG tablet, Take 4 mg by mouth every 8 (eight) hours. , Disp: , Rfl:     topiramate (TOPAMAX) 100 MG tablet, Take 3 tablets (300 mg total) by mouth 2 (two) times daily., Disp: 180 tablet, Rfl: 5    venlafaxine (EFFEXOR-XR) 75 MG 24 hr capsule, Take 1 capsule (75 mg total) by mouth every evening., Disp: 90 capsule, Rfl: 1      Vitamin/Supplements/Herbs: none     Potential interactions with food    Allergies:  Review of patient's allergies indicates:  No Known Allergies    Labs:Last Labs:  Does not regularly check BG   Glucose   Date Value Ref Range Status   11/05/2018 93 70 - 110 mg/dL Final   10/10/2018 101 70 - 110 mg/dL Final     BUN, Bld   Date Value Ref Range Status   11/05/2018 13 6 - 20 mg/dL Final   10/10/2018 12 6 - 20 mg/dL Final     Creatinine   Date Value Ref Range Status   11/05/2018 0.9 0.5 - 1.4 mg/dL Final   10/10/2018 1.1 0.5 - 1.4 mg/dL Final     Sodium   Date Value Ref Range Status   11/05/2018 135 (L) 136 - 145 mmol/L Final   10/10/2018 139 136 - 145 mmol/L Final     Potassium   Date Value Ref Range Status   11/05/2018 3.6 3.5 - 5.1 mmol/L Final   10/10/2018 3.7 3.5 - 5.1 mmol/L Final     Phosphorus   Date Value Ref Range Status   07/30/2016 3.5 2.7 - 4.5 mg/dL Final   06/11/2015 2.1 (L) 2.7 - 4.5 mg/dL Final     Calcium   Date Value Ref Range Status   11/05/2018 9.0 8.7 - 10.5 mg/dL Final   10/10/2018 8.8 8.7 - 10.5 mg/dL Final     No results found for: PREALBUMIN  Total Protein   Date Value Ref Range Status   11/05/2018 7.1 6.0 - 8.4 g/dL Final   08/08/2018 7.2 6.0 - 8.4 g/dL Final     Cholesterol   Date Value Ref Range Status   01/22/2018 201 (H) 120 - 199 mg/dL Final     Comment:     The National Cholesterol Education Program (NCEP)  has set the  following guidelines (reference ranges) for Cholesterol:  Optimal.....................<200 mg/dL  Borderline High.............200-239 mg/dL  High........................> or = 240 mg/dL     09/21/2017 228 (H) 120 - 199 mg/dL Final     Comment:     The National Cholesterol Education Program (NCEP) has set the  following guidelines (reference ranges) for Cholesterol:  Optimal.....................<200 mg/dL  Borderline High.............200-239 mg/dL  High........................> or = 240 mg/dL       Hemoglobin A1C   Date Value Ref Range Status   07/30/2018 5.8 (H) 4.0 - 5.6 % Final     Comment:     ADA Screening Guidelines:  5.7-6.4%  Consistent with prediabetes  >or=6.5%  Consistent with diabetes  High levels of fetal hemoglobin interfere with the HbA1C  assay. Heterozygous hemoglobin variants (HbS, HgC, etc)do  not significantly interfere with this assay.   However, presence of multiple variants may affect accuracy.     01/22/2018 5.8 (H) 4.0 - 5.6 % Final     Comment:     According to ADA guidelines, hemoglobin A1c <7.0% represents  optimal control in non-pregnant diabetic patients. Different  metrics may apply to specific patient populations.   Standards of Medical Care in Diabetes-2016.  For the purpose of screening for the presence of diabetes:  <5.7%     Consistent with the absence of diabetes  5.7-6.4%  Consistent with increasing risk for diabetes   (prediabetes)  >or=6.5%  Consistent with diabetes  Currently, no consensus exists for use of hemoglobin A1c  for diagnosis of diabetes for children.  This Hemoglobin A1c assay has significant interference with fetal   hemoglobin   (HbF). The results are invalid for patients with abnormal amounts of   HbF,   including those with known Hereditary Persistence   of Fetal Hemoglobin. Heterozygous hemoglobin variants (HbAS, HbAC,   HbAD, HbAE, HbA2) do not significantly interfere with this assay;   however, presence of multiple variants in a sample may  "impact the %   interference.       Hemoglobin   Date Value Ref Range Status   2018 11.2 (L) 12.0 - 16.0 g/dL Final   10/10/2018 11.6 (L) 12.0 - 16.0 g/dL Final     Hematocrit   Date Value Ref Range Status   2018 36.9 (L) 37.0 - 48.5 % Final   10/10/2018 38.0 37.0 - 48.5 % Final     Iron   Date Value Ref Range Status   2018 31 30 - 160 ug/dL Final     No components found for: FROLATE  Vit D, 25-Hydroxy   Date Value Ref Range Status   2018 12 (L) 30 - 96 ng/mL Final     Comment:     Vitamin D deficiency.........<10 ng/mL                              Vitamin D insufficiency......10-29 ng/mL       Vitamin D sufficiency........> or equal to 30 ng/mL  Vitamin D toxicity............>100 ng/mL       WBC   Date Value Ref Range Status   2018 5.30 3.90 - 12.70 K/uL Final   10/10/2018 3.49 (L) 3.90 - 12.70 K/uL Final       : 1970  Age: 48 y.o.    Anthropometrics    Estimated body mass index is 62.93 kg/m² as calculated from the following:    Height as of this encounter: 5' 4" (1.626 m).    Weight as of this encounter: 166.3 kg (366 lb 10 oz).    For Adults 20 Years and Older:    BMI Weight Status   Below 18.5 Underweight   18.6-24.9 Normal/Healthy   25.0-29.9 Overweight   30.0 & Above Obese     Ideal Weight Range for Your Height: 5'4" = 110 - 144 lbs    Weight History:  Wt Readings from Last 12 Encounters:   18 (!) 166.3 kg (366 lb 10 oz)   18 (!) 166.7 kg (367 lb 8.1 oz)   18 (!) 168.5 kg (371 lb 7.6 oz)   10/30/18 (!) 168.7 kg (372 lb)   10/19/18 (!) 169 kg (372 lb 9.2 oz)   10/15/18 (!) 160.6 kg (353 lb 15.9 oz)   10/10/18 (!) 160.6 kg (354 lb)   18 (!) 165.1 kg (364 lb)   09/10/18 (!) 165.4 kg (364 lb 10.3 oz)   18 (!) 165.2 kg (364 lb 3.2 oz)   18 (!) 162.3 kg (357 lb 12.9 oz)   18 (!) 163.9 kg (361 lb 5.3 oz)   ]      Weight Changes/Trend:   on diuretics +/-  5#  Currently stable      Estimated Nutrition Needs:   Energy Needs:  (MSJ x  PAL) " 1800 calories   Protein Needs: ( gm Protein/kg) 80 grams   Fluid Needs:   (1 mL/calorie) 1800cc     Nutrition Focused Physical Findings:   Morbid obesity ; belching as we sat talking ; difficulty resolving plantar fasciitis due to caring for little ones and weight        Nutrition History   Uses chewing gum to settle stomach ; drinks using straws,  consumes snack foods ( cookies, chips) to maintain wakefulness ; occasional chocolate, reports milk poorly tolerated ( gas and diarrhea) but tolerates cheese      Meal Pattern: 1 meal per day but multiple snacks   Breakfast: may drink sweet tea or sprite all day and skip meal due to constant Reflux symptoms   Lunch: main meal prepared and consumed midday - beans , rice gravy   Snacks: chips and cookies consumed in evening while reading due to poor sleep habits   Beverage Intake: sweetened tea, sprite     Food Intolerance: Milk, fatty foods, mint, dry foods due to limited fluid intake     Meal Preparation: self   Dining Out: rarely     Dentition: Difficulty chewing or swallowing? Swallowing dry boluses unless adequately moistened   Activity Level/Physical Limitations : plantar fasciitis pain limits mobility     Motivation to make Changes :   precontemplative- food is source of comfort ; discussed mental health support options at no cost on [place]     Anticipated barriers to making changes and/ or expected compliance   social stressors, household issues and poor sleep habits  Food and nutrition-related knowledge deficit   Emotional eating       Nutrition Diagnosis  Nutrition Problem  Altered GI Function  Inadequate fluid intake  Related to (etiology):   GERD  Gastroparesis   Signs and Symptoms (as evidenced by):   Gulping air when consuming liquids by using straw or consuming meals rapidly   Use or portable snacks consumed at a fast pace with inadequate moistening   Symptoms are clearly aggravated by stressful situations.    Nutrition Diagnosis Status:    New      Recommendations/Interventions/Goals:  Decrease soda or juice intake, increase water intake, reduce fast food intake and consider use of meal replacement to better manage Ge reflux, volume regulation of meals and gastric emptying   caffeine reduction, dietary changes, elevate HOB, NPO after supper, tobacco cessation, weight loss    Follow up with sleep specialist regarding SHARATH     Discussed approaches to managing Gastroparesis - liquids empty more quickly than solids ; CPAP machine may improve reflux symptoms, sleep and food choices to maintain alertness ; stress management and boundary setting with others will improve tendency for emotional eating     Texture-modified diet and Diets modified for specific foods or ingedients   Reviewed guidelines for GE Reflux and Gastroparesis - liquids empty more quickly ; use of crock pot, avoidance of dry , whole grain foods ; adequate moistening of foods ; meal replacments discussed   Reviewed drug: nutrient interaction with Topamax - adequate fluid necessary to prevent kidney stones       Comprehension: of recommendations :   Good based upon verbalization of no straws, chewing gum non mint ; sugar free liquids ; meal replacement would provide a go to if reflux is a problem from overeating high fat meals   Will follow up with seeing one of several free/low cost mental health community services   Will see in a month due to time limitations today with lateness     Monitoring : will follow up in one month   Routed to Dr Sweeney     Consultation Time:60 minutes.      Follow Up:1 months.

## 2018-11-14 ENCOUNTER — OFFICE VISIT (OUTPATIENT)
Dept: OBSTETRICS AND GYNECOLOGY | Facility: CLINIC | Age: 48
End: 2018-11-14
Payer: MEDICARE

## 2018-11-14 VITALS
SYSTOLIC BLOOD PRESSURE: 130 MMHG | BODY MASS INDEX: 50.02 KG/M2 | DIASTOLIC BLOOD PRESSURE: 96 MMHG | WEIGHT: 293 LBS | HEIGHT: 64 IN

## 2018-11-14 DIAGNOSIS — N85.01 SIMPLE ENDOMETRIAL HYPERPLASIA: ICD-10-CM

## 2018-11-14 DIAGNOSIS — Z98.890 POST-OPERATIVE STATE: Primary | ICD-10-CM

## 2018-11-14 PROCEDURE — 99024 POSTOP FOLLOW-UP VISIT: CPT | Mod: HCNC,S$GLB,, | Performed by: OBSTETRICS & GYNECOLOGY

## 2018-11-14 PROCEDURE — 99999 PR PBB SHADOW E&M-EST. PATIENT-LVL II: CPT | Mod: PBBFAC,,, | Performed by: OBSTETRICS & GYNECOLOGY

## 2018-11-14 RX ORDER — POLYETHYLENE GLYCOL-3350 AND ELECTROLYTES 236; 6.74; 5.86; 2.97; 22.74 G/274.31G; G/274.31G; G/274.31G; G/274.31G; G/274.31G
POWDER, FOR SOLUTION ORAL
Refills: 0 | COMMUNITY
Start: 2018-11-09 | End: 2019-08-19

## 2018-11-14 RX ORDER — NAPROXEN 500 MG/1
500 TABLET ORAL 2 TIMES DAILY
Qty: 30 TABLET | Refills: 3 | Status: ON HOLD | OUTPATIENT
Start: 2018-11-14 | End: 2019-03-21 | Stop reason: HOSPADM

## 2018-11-14 NOTE — PROGRESS NOTES
PT HERE S/P D&C H-SCOPE IS DOING WELL WITH NO BLEEDING.  TOOK PROGESTERONE D 1-12 WITH NO BLEEDING. DOES HAVE CRAMPS.    -FINAL PATHOLOGIC DIAGNOSIS  1. ENDOMETRIUM: BENIGN ENDOMETRIAL POLYP WITH SIMPLE HYPERPLASIA; NO EVIDENCE OF  ATYPIA OR MALIGNANCY.  2. ENDOMETRIUM: FRAGMENTS OF DISORDERED PROLIFERATIVE ENDOMETRIUM; NO EVIDENCE OF  ATYPIA OR MALIGNANCY.      PE:  General Appearance: obese And Well developed. Well nourished. In no acute distress.  No tenderness.      PROCEDURES:    PLAN:     DIAGNOSIS:  1. Post-operative state    2. Simple endometrial hyperplasia        MEDICATIONS & ORDERS:       FOLLOW-UP: With me ANNUAL.

## 2018-11-16 ENCOUNTER — HOSPITAL ENCOUNTER (OUTPATIENT)
Dept: RADIOLOGY | Facility: HOSPITAL | Age: 48
Discharge: HOME | End: 2018-11-16
Attending: INTERNAL MEDICINE
Payer: MEDICARE

## 2018-11-16 ENCOUNTER — HOSPITAL ENCOUNTER (OUTPATIENT)
Dept: CARDIOLOGY | Facility: HOSPITAL | Age: 48
Discharge: HOME | End: 2018-11-16
Attending: INTERNAL MEDICINE
Payer: MEDICARE

## 2018-11-16 VITALS — WEIGHT: 293 LBS | BODY MASS INDEX: 50.02 KG/M2 | HEIGHT: 64 IN

## 2018-11-16 DIAGNOSIS — R06.09 DOE (DYSPNEA ON EXERTION): ICD-10-CM

## 2018-11-16 DIAGNOSIS — R07.9 CHEST PAIN, UNSPECIFIED TYPE: ICD-10-CM

## 2018-11-16 DIAGNOSIS — Z01.810 PREOP CARDIOVASCULAR EXAM: ICD-10-CM

## 2018-11-16 LAB
AORTIC ROOT ANNULUS: 2.88 CM
AORTIC VALVE CUSP SEPERATION: 2.21 CM
ASCENDING AORTA: 2.87 CM
AV MEAN GRADIENT: 5.26 MMHG
AV PEAK GRADIENT: 10.63 MMHG
AV VALVE AREA: 2.63 CM2
BSA FOR ECHO PROCEDURE: 2.74 M2
CV ECHO LV RWT: 0.44 CM
CV STRESS BASE HR: 56
DIASTOLIC BLOOD PRESSURE: 94
DOP CALC AO PEAK VEL: 1.63 M/S
DOP CALC AO VTI: 33.96 CM
DOP CALC LVOT AREA: 3.08 CM2
DOP CALC LVOT DIAMETER: 1.98 CM
DOP CALC LVOT STROKE VOLUME: 89.31 CM3
DOP CALCLVOT PEAK VEL VTI: 29.02 CM
E WAVE DECELERATION TIME: 205.72 MSEC
E/A RATIO: 1.03
E/E' RATIO: 9.38
ECHO LV POSTERIOR WALL: 1.17 CM (ref 0.6–1.1)
FRACTIONAL SHORTENING: 32 % (ref 28–44)
INTERVENTRICULAR SEPTUM: 1.2 CM (ref 0.6–1.1)
IVRT: 0.14 MSEC
LA MAJOR: 5.45 CM
LA MINOR: 5.39 CM
LA WIDTH: 4.31 CM
LEFT ATRIUM SIZE: 4.83 CM
LEFT ATRIUM VOLUME INDEX: 35 ML/M2
LEFT ATRIUM VOLUME: 95.9 CM3
LEFT INTERNAL DIMENSION IN SYSTOLE: 3.62 CM (ref 2.1–4)
LEFT VENTRICLE DIASTOLIC VOLUME INDEX: 49.8 ML/M2
LEFT VENTRICLE DIASTOLIC VOLUME: 136.44 ML
LEFT VENTRICLE MASS INDEX: 92.6 G/M2
LEFT VENTRICLE SYSTOLIC VOLUME INDEX: 20.1 ML/M2
LEFT VENTRICLE SYSTOLIC VOLUME: 55.1 ML
LEFT VENTRICULAR INTERNAL DIMENSION IN DIASTOLE: 5.32 CM (ref 3.5–6)
LEFT VENTRICULAR MASS: 253.68 G
LV LATERAL E/E' RATIO: 9.38
LV SEPTAL E/E' RATIO: 9.38
MV PEAK A VEL: 0.73 M/S
MV PEAK E VEL: 0.75 M/S
NUC REST EJECTION FRACTION: 67
OHS CV CPX 85 PERCENT MAX PREDICTED HEART RATE MALE: 141
OHS CV CPX MAX PREDICTED HEART RATE: 166
OHS CV CPX PATIENT IS FEMALE: 1
OHS CV CPX PATIENT IS MALE: 0
OHS CV CPX PEAK DIASTOLIC BLOOD PRESSURE: 93 MMHG
OHS CV CPX PEAK HEAR RATE: 88
OHS CV CPX PEAK RATE PRESSURE PRODUCT: NORMAL
OHS CV CPX PEAK SYSTOLIC BLOOD PRESSURE: 152
OHS CV CPX PERCENT MAX PREDICTED HEART RATE ACHIEVED: 53
OHS CV CPX RATE PRESSURE PRODUCT PRESENTING: 7840
PISA TR MAX VEL: 1.1 M/S
PV PEAK VELOCITY: 0.96 CM/S
RA MAJOR: 4.85 CM
RA PRESSURE: 3 MMHG
RA WIDTH: 3.6 CM
RIGHT VENTRICULAR END-DIASTOLIC DIMENSION: 3.27 CM
RV TISSUE DOPPLER FREE WALL SYSTOLIC VELOCITY 1 (APICAL 4 CHAMBER VIEW): 11.57 M/S
SINUS: 3.05 CM
STJ: 2.61 CM
SYSTOLIC BLOOD PRESSURE: 140
TDI LATERAL: 0.08
TDI SEPTAL: 0.08
TDI: 0.08
TR MAX PG: 4.84 MMHG
TRICUSPID ANNULAR PLANE SYSTOLIC EXCURSION: 2.43 CM
TV REST PULMONARY ARTERY PRESSURE: 7.84 MMHG

## 2018-11-16 PROCEDURE — 93306 TTE W/DOPPLER COMPLETE: CPT | Mod: 26,HCNC,, | Performed by: INTERNAL MEDICINE

## 2018-11-16 PROCEDURE — 93016 CV STRESS TEST SUPVJ ONLY: CPT | Mod: HCNC,,, | Performed by: INTERNAL MEDICINE

## 2018-11-16 PROCEDURE — 63600175 PHARM REV CODE 636 W HCPCS: Mod: HCNC

## 2018-11-16 PROCEDURE — 78452 HT MUSCLE IMAGE SPECT MULT: CPT | Mod: 26,HCNC,, | Performed by: INTERNAL MEDICINE

## 2018-11-16 PROCEDURE — 93018 CV STRESS TEST I&R ONLY: CPT | Mod: HCNC,,, | Performed by: INTERNAL MEDICINE

## 2018-11-16 PROCEDURE — 93306 TTE W/DOPPLER COMPLETE: CPT | Mod: HCNC

## 2018-11-16 PROCEDURE — 78452 HT MUSCLE IMAGE SPECT MULT: CPT | Mod: HCNC

## 2018-11-16 RX ORDER — REGADENOSON 0.08 MG/ML
INJECTION, SOLUTION INTRAVENOUS
Status: COMPLETED
Start: 2018-11-16 | End: 2018-11-16

## 2018-11-16 RX ADMIN — REGADENOSON: 0.08 INJECTION, SOLUTION INTRAVENOUS at 09:11

## 2018-12-04 ENCOUNTER — NUTRITION (OUTPATIENT)
Dept: GASTROENTEROLOGY | Facility: CLINIC | Age: 48
End: 2018-12-04
Payer: MEDICARE

## 2018-12-04 VITALS — HEIGHT: 64 IN | BODY MASS INDEX: 50.02 KG/M2 | WEIGHT: 293 LBS

## 2018-12-04 DIAGNOSIS — N18.30 TYPE 2 DIABETES MELLITUS WITH STAGE 3 CHRONIC KIDNEY DISEASE, WITH LONG-TERM CURRENT USE OF INSULIN: ICD-10-CM

## 2018-12-04 DIAGNOSIS — F32.A DEPRESSION, UNSPECIFIED DEPRESSION TYPE: ICD-10-CM

## 2018-12-04 DIAGNOSIS — K21.9 GASTROESOPHAGEAL REFLUX DISEASE WITHOUT ESOPHAGITIS: Primary | ICD-10-CM

## 2018-12-04 DIAGNOSIS — E11.22 TYPE 2 DIABETES MELLITUS WITH STAGE 3 CHRONIC KIDNEY DISEASE, WITH LONG-TERM CURRENT USE OF INSULIN: ICD-10-CM

## 2018-12-04 DIAGNOSIS — E66.01 MORBID OBESITY WITH BMI OF 60.0-69.9, ADULT: ICD-10-CM

## 2018-12-04 DIAGNOSIS — Z79.4 TYPE 2 DIABETES MELLITUS WITH STAGE 3 CHRONIC KIDNEY DISEASE, WITH LONG-TERM CURRENT USE OF INSULIN: ICD-10-CM

## 2018-12-04 PROCEDURE — 99999 PR PBB SHADOW E&M-EST. PATIENT-LVL I: CPT | Mod: PBBFAC,,,

## 2018-12-04 NOTE — PROGRESS NOTES
Nutrition Assessment for Medical Nutrition Therapy    Referring professional: Jagruti Sweeney MD  Follow up appt from initial of      Reason for Visit:  GERD and gastroparesis and reports difficulty with swallowing dry foods but sounds more like combination of mouth drying effects of meds , inadequate fluid intake and choice of dry foods - chips for snacks      Clinical Signs and Symptoms:   reflux symptoms, nausea and delayed gastri emptying      Bowel :   Constipated due to inadequate fluid intake     Pertinent Social History: stressors with family expectations and little time for self care continue to exacerbate GI issues . Discussed at length benefits of seeing mental health support and provided low cost/free resources to follow up   Medical History:     Past Medical History:   Diagnosis Date    Allergy     Anemia     Asthma     Depression     Diabetes with neurologic complications     GERD (gastroesophageal reflux disease)     High cholesterol     Hyperprolactinemia     Hypertension     Pseudotumor cerebri     Seizures     Simple endometrial hyperplasia 2018    Sleep apnea     Smoker        Past Surgical History:   Procedure Laterality Date    BREAST CYST ASPIRATION       SECTION      X 1    CHOLECYSTECTOMY      COLONOSCOPY      HYSTEROSCOPY WITH DILATION AND CURETTAGE OF UTERUS N/A 10/16/2018    [facility]    HYSTEROSCOPY, WITH DILATION AND CURETTAGE OF UTERUS N/A 10/16/2018    Performed by Glenn Black Jr[facility] at [facility] OR    SINUS SURGERY      UPPER GASTROINTESTINAL ENDOSCOPY            Pertinent Medications:   Current Outpatient Medications on File Prior to Visit   Medication Sig Dispense Refill    acetaZOLAMIDE (DIAMOX) 500 mg CpSR Take 1 capsule (500 mg total) by mouth 2 (two) times daily. 360 capsule 1    albuterol 90 mcg/actuation inhaler Inhale 2 puffs into the lungs every 6 (six) hours as needed for Wheezing. Rescue 1 Inhaler 0    albuterol-ipratropium (DUO-NEB) 2.5  mg-0.5 mg/3 mL nebulizer solution Take 3 mLs by nebulization every 6 (six) hours as needed for Wheezing or Shortness of Breath. Rescue 100 mL 3    blood sugar diagnostic Strp 1 each by Misc.(Non-Drug; Combo Route) route 4 (four) times daily before meals and nightly. ACCU CHEK ELVIA PLUS METER 200 each 3    blood-glucose meter (ACCU-CHEK ELVIA PLUS METER) [facility] [place] TIMES DAILY BEFORE MEALS AND EVERY NIGHT 90 each 1    budesonide-formoterol 160-4.5 mcg (SYMBICORT) 160-4.5 mcg/actuation HFAA Inhale 2 puffs into the lungs every 12 (twelve) hours. Controller 1 Inhaler 5    capsaicin 0.1 % Crea Apply 1 application topically 2 (two) times daily. 56.6 g 1    cetirizine (ZYRTEC) 10 MG tablet Take 1 tablet (10 mg total) by mouth 2 (two) times daily. 60 tablet 3    fluticasone (FLONASE) 50 mcg/actuation nasal spray 1 spray (50 mcg total) by Each Nare route once daily. 16 g 5    GAVILYTE-G 236-22.74-6.74 -5.86 gram suspension   0    hydroCHLOROthiazide (HYDRODIURIL) 25 MG tablet Take 1 tablet (25 mg total) by mouth once daily. 30 tablet 11    hydrOXYzine (ATARAX) 50 MG tablet Take 1-4 at night for itching. 120 tablet 3    lancets (ACCU-CHEK SOFTCLIX LANCETS) Misc 1 Device by Misc.(Non-Drug; Combo Route) route 4 (four) times daily. 200 each 3    losartan (COZAAR) 100 MG tablet TAKE 1 TABLET BY MOUTH EVERY DAY 90 tablet 0    medroxyPROGESTERone (PROVERA) 10 MG tablet Take 1 tablet (10 mg total) by mouth once daily. TAKE DAY 1-12 Q MONTH 12 tablet 12    meloxicam (MOBIC) 15 MG tablet TAKE 1 TABLET(15 MG) BY MOUTH DAILY AS NEEDED FOR HEADACHE 90 tablet 1    metformin (GLUCOPHAGE-XR) 500 MG 24 hr tablet Take 2 tablets (1,000 mg total) by mouth 2 (two) times daily with meals. 360 tablet 0    mometasone 0.1% (ELOCON) 0.1 % cream       montelukast (SINGULAIR) 10 mg tablet TAKE 1 TABLET(10 MG) BY MOUTH EVERY EVENING 90 tablet 0    montelukast (SINGULAIR) 10 mg tablet TAKE 1 TABLET(10 MG) BY MOUTH EVERY EVENING 30  tablet 0    naproxen (EC NAPROSYN) 500 MG EC tablet Take 1 tablet (500 mg total) by mouth 2 (two) times daily. 30 tablet 3    nicotine polacrilex 2 MG Lozg Take 1 lozenge (2 mg total) by mouth as needed. 168 lozenge 0    ondansetron (ZOFRAN) 8 MG tablet Take 1 tablet (8 mg total) by mouth every 8 (eight) hours as needed for Nausea. 90 tablet 5    oxyCODONE-acetaminophen (PERCOCET) 5-325 mg per tablet Take 1 tablet by mouth every 4 (four) hours as needed for Pain. 10 tablet 0    pantoprazole (PROTONIX) 40 MG tablet TK 1 T PO QD 1 HOUR B MEALS  5    plecanatide (TRULANCE) 3 mg Tab Take 3 mg by mouth once daily. 30 tablet 6    QUEtiapine (SEROQUEL) 25 MG Tab TAKE 1 TABLET(25 MG) BY MOUTH EVERY EVENING 90 tablet 0    sumatriptan (IMITREX) 50 MG tablet Take 1 tab at onset of headaches.  If no improvement in 2 hours, take another.  Do not take more than 2 tabs in 24 hours. 9 tablet 5    tiotropium (SPIRIVA) 18 mcg inhalation capsule Inhale 1 capsule (18 mcg total) into the lungs once daily. Controller 30 capsule 11    tiZANidine (ZANAFLEX) 4 MG tablet Take 4 mg by mouth every 8 (eight) hours.       topiramate (TOPAMAX) 100 MG tablet Take 3 tablets (300 mg total) by mouth 2 (two) times daily. 180 tablet 5    venlafaxine (EFFEXOR-XR) 75 MG 24 hr capsule Take 1 capsule (75 mg total) by mouth every evening. 90 capsule 1     No current facility-administered medications on file prior to visit.        Vitamins/Supplements/Herbs: taking gummy vits     Food intolerances or allergies: milk and red gravy ; tried meal replacement Premier Protein but did not like the milk base; discussed premier protein clear to try   Review of patient's allergies indicates:  No Known Allergies    Labs:      Chemistry        Component Value Date/Time     (L) 11/05/2018 1205    K 3.6 11/05/2018 1205     11/05/2018 1205    CO2 24 11/05/2018 1205    BUN 13 11/05/2018 1205    CREATININE 0.9 11/05/2018 1205    GLU 93 11/05/2018 1205         Component Value Date/Time    CALCIUM 9.0 11/05/2018 1205    ALKPHOS 64 11/05/2018 1205    AST 11 11/05/2018 1205    ALT 11 11/05/2018 1205    BILITOT 0.3 11/05/2018 1205    ESTGFRAFRICA >60.0 11/05/2018 1205    EGFRNONAA >60.0 11/05/2018 1205          Lab Results   Component Value Date    CHOL 201 (H) 01/22/2018    CHOL 228 (H) 09/21/2017    CHOL 145 07/30/2016     Lab Results   Component Value Date    HDL 56 01/22/2018    HDL 51 09/21/2017    HDL 52 07/30/2016     Lab Results   Component Value Date    LDLCALC 124.2 01/22/2018    LDLCALC 156.8 09/21/2017    LDLCALC 80.0 07/30/2016     Lab Results   Component Value Date    TRIG 104 01/22/2018    TRIG 101 09/21/2017    TRIG 65 07/30/2016     Lab Results   Component Value Date    CHOLHDL 27.9 01/22/2018    CHOLHDL 22.4 09/21/2017    CHOLHDL 35.9 07/30/2016     Lab Results   Component Value Date    HGBA1C 5.8 (H) 07/30/2018                  Last 3 Weights:   Wt Readings from Last 3 Encounters:   12/04/18 (!) 168.7 kg (371 lb 14.7 oz)   11/16/18 (!) 166.5 kg (367 lb)   11/14/18 (!) 165.8 kg (365 lb 8.4 oz)     BMI: Body mass index is 63.84 kg/m².    Weight status: consistently increasing secondary to using salty snack foods to cope with stressors     MNT Recommendations/Interventions/Goals:  Premier Clear meal replacement to try   Low sugar liquids /water   Crock pot use to add moisture to foods and to enhance digestibility   Reviewed community mental health resources again and encouraged follow up     Comprehensiongood   Patient demonstrated understanding: clear understanding of food as coping mechanism for stressors and reviewed ways to calm self without food     Nutrition follow-up:  As needed , issues are more mental health than nutrition but will continue to provide nutrition supports and encouragement   Routed   Counseling time: 45 Minutes

## 2018-12-06 ENCOUNTER — OFFICE VISIT (OUTPATIENT)
Dept: CARDIOLOGY | Facility: CLINIC | Age: 48
End: 2018-12-06
Payer: MEDICARE

## 2018-12-06 VITALS
SYSTOLIC BLOOD PRESSURE: 134 MMHG | BODY MASS INDEX: 50.02 KG/M2 | RESPIRATION RATE: 15 BRPM | OXYGEN SATURATION: 98 % | DIASTOLIC BLOOD PRESSURE: 78 MMHG | HEIGHT: 64 IN | HEART RATE: 86 BPM | WEIGHT: 293 LBS

## 2018-12-06 DIAGNOSIS — E11.69 COMBINED HYPERLIPIDEMIA ASSOCIATED WITH TYPE 2 DIABETES MELLITUS: ICD-10-CM

## 2018-12-06 DIAGNOSIS — F17.200 TOBACCO USE DISORDER, MODERATE, DEPENDENCE: ICD-10-CM

## 2018-12-06 DIAGNOSIS — I10 ESSENTIAL HYPERTENSION: Chronic | ICD-10-CM

## 2018-12-06 DIAGNOSIS — Z79.4 TYPE 2 DIABETES MELLITUS WITH STAGE 3 CHRONIC KIDNEY DISEASE, WITH LONG-TERM CURRENT USE OF INSULIN: ICD-10-CM

## 2018-12-06 DIAGNOSIS — E66.01 MORBID OBESITY WITH BMI OF 60.0-69.9, ADULT: Chronic | ICD-10-CM

## 2018-12-06 DIAGNOSIS — E78.2 COMBINED HYPERLIPIDEMIA ASSOCIATED WITH TYPE 2 DIABETES MELLITUS: ICD-10-CM

## 2018-12-06 DIAGNOSIS — E11.22 TYPE 2 DIABETES MELLITUS WITH STAGE 3 CHRONIC KIDNEY DISEASE, WITH LONG-TERM CURRENT USE OF INSULIN: ICD-10-CM

## 2018-12-06 DIAGNOSIS — Z01.810 PREOP CARDIOVASCULAR EXAM: ICD-10-CM

## 2018-12-06 DIAGNOSIS — N18.30 TYPE 2 DIABETES MELLITUS WITH STAGE 3 CHRONIC KIDNEY DISEASE, WITH LONG-TERM CURRENT USE OF INSULIN: ICD-10-CM

## 2018-12-06 DIAGNOSIS — R07.9 CHEST PAIN, UNSPECIFIED TYPE: Primary | ICD-10-CM

## 2018-12-06 PROCEDURE — 99999 PR PBB SHADOW E&M-EST. PATIENT-LVL III: CPT | Mod: PBBFAC,HCNC,, | Performed by: INTERNAL MEDICINE

## 2018-12-06 PROCEDURE — 99214 OFFICE O/P EST MOD 30 MIN: CPT | Mod: HCNC,S$GLB,, | Performed by: INTERNAL MEDICINE

## 2018-12-06 PROCEDURE — 99213 OFFICE O/P EST LOW 20 MIN: CPT | Mod: PBBFAC,PO | Performed by: INTERNAL MEDICINE

## 2018-12-06 NOTE — PROGRESS NOTES
CARDIOVASCULAR PROGRESS NOTE    REASON FOR CONSULT:   Yanni Kaiser is a 48 y.o. female who presents for f/u of CP, testing.    PCP: Page  Req: Millie (GI NP)  HISTORY OF PRESENT ILLNESS:   The patient returns for follow-up of her testing.  She continues to complain of intercurrent nonexertional chest discomfort.  She has had no shortness of breath.  She also complains of generalized edema.  She has had no palpitations, lightheadedness, dizziness, or syncope.  She denies PND, orthopnea, melena, hematuria, or claudicant symptoms.  EGD is currently scheduled for next month.    CARDIOVASCULAR HISTORY:   none    PAST MEDICAL HISTORY:     Past Medical History:   Diagnosis Date    Allergy     Anemia     Asthma     Depression     Diabetes with neurologic complications     GERD (gastroesophageal reflux disease)     High cholesterol     Hyperprolactinemia     Hypertension     Pseudotumor cerebri     Seizures     Simple endometrial hyperplasia 2018    Sleep apnea     Smoker        PAST SURGICAL HISTORY:     Past Surgical History:   Procedure Laterality Date    BREAST CYST ASPIRATION       SECTION      X 1    CHOLECYSTECTOMY      COLONOSCOPY      HYSTEROSCOPY WITH DILATION AND CURETTAGE OF UTERUS N/A 10/16/2018    [facility]    HYSTEROSCOPY, WITH DILATION AND CURETTAGE OF UTERUS N/A 10/16/2018    Performed by Glenn Black Jr., MD at [facility] OR    SINUS SURGERY      UPPER GASTROINTESTINAL ENDOSCOPY         ALLERGIES AND MEDICATION:   Review of patient's allergies indicates:  No Known Allergies     Medication List           Accurate as of 18  3:27 PM. If you have any questions, ask your nurse or doctor.               CONTINUE taking these medications    acetaZOLAMIDE 500 mg Cpsr  Commonly known as:  DIAMOX  Take 1 capsule (500 mg total) by mouth 2 (two) times daily.     albuterol 90 mcg/actuation inhaler  Commonly known as:  PROVENTIL/VENTOLIN HFA  Inhale 2 puffs into the lungs every 6 (six)  hours as needed for Wheezing. Rescue     albuterol-ipratropium 2.5 mg-0.5 mg/3 mL nebulizer solution  Commonly known as:  DUO-NEB  Take 3 mLs by nebulization every 6 (six) hours as needed for Wheezing or Shortness of Breath. Rescue     blood sugar diagnostic Strp  1 each by Misc.(Non-Drug; Combo Route) route 4 (four) times daily before meals and nightly. ACCU CHEK ELVIA PLUS METER     blood-glucose meter Misc  Commonly known as:  ACCU-CHEK ELVIA PLUS METER  TEST FOUR TIMES DAILY BEFORE MEALS AND EVERY NIGHT     budesonide-formoterol 160-4.5 mcg 160-4.5 mcg/actuation Hfaa  Commonly known as:  SYMBICORT  Inhale 2 puffs into the lungs every 12 (twelve) hours. Controller     capsaicin 0.1 % Crea  Apply 1 application topically 2 (two) times daily.     cetirizine 10 MG tablet  Commonly known as:  ZYRTEC  Take 1 tablet (10 mg total) by mouth 2 (two) times daily.     fluticasone 50 mcg/actuation nasal spray  Commonly known as:  FLONASE  1 spray (50 mcg total) by Each Nare route once daily.     GAVILYTE-G 236-22.74-6.74 -5.86 gram suspension  Generic drug:  polyethylene glycol     hydroCHLOROthiazide 25 MG tablet  Commonly known as:  HYDRODIURIL  Take 1 tablet (25 mg total) by mouth once daily.     hydrOXYzine 50 MG tablet  Commonly known as:  ATARAX  Take 1-4 at night for itching.     lancets Misc  Commonly known as:  ACCU-CHEK SOFTCLIX LANCETS  1 Device by Misc.(Non-Drug; Combo Route) route 4 (four) times daily.     losartan 100 MG tablet  Commonly known as:  COZAAR  TAKE 1 TABLET BY MOUTH EVERY DAY     medroxyPROGESTERone 10 MG tablet  Commonly known as:  PROVERA  Take 1 tablet (10 mg total) by mouth once daily. TAKE DAY 1-12 Q MONTH     meloxicam 15 MG tablet  Commonly known as:  MOBIC  TAKE 1 TABLET(15 MG) BY MOUTH DAILY AS NEEDED FOR HEADACHE     metFORMIN 500 MG 24 hr tablet  Commonly known as:  GLUCOPHAGE-XR  Take 2 tablets (1,000 mg total) by mouth 2 (two) times daily with meals.     mometasone 0.1% 0.1 %  cream  Commonly known as:  ELOCON     * montelukast 10 mg tablet  Commonly known as:  SINGULAIR  TAKE 1 TABLET(10 MG) BY MOUTH EVERY EVENING     * montelukast 10 mg tablet  Commonly known as:  SINGULAIR  TAKE 1 TABLET(10 MG) BY MOUTH EVERY EVENING     naproxen 500 MG EC tablet  Commonly known as:  EC NAPROSYN  Take 1 tablet (500 mg total) by mouth 2 (two) times daily.     nicotine polacrilex 2 MG Lozg  Take 1 lozenge (2 mg total) by mouth as needed.     ondansetron 8 MG tablet  Commonly known as:  ZOFRAN  Take 1 tablet (8 mg total) by mouth every 8 (eight) hours as needed for Nausea.     oxyCODONE-acetaminophen 5-325 mg per tablet  Commonly known as:  PERCOCET  Take 1 tablet by mouth every 4 (four) hours as needed for Pain.     pantoprazole 40 MG tablet  Commonly known as:  PROTONIX     plecanatide 3 mg Tab  Commonly known as:  TRULANCE  Take 3 mg by mouth once daily.     QUEtiapine 25 MG Tab  Commonly known as:  SEROQUEL  TAKE 1 TABLET(25 MG) BY MOUTH EVERY EVENING     sumatriptan 50 MG tablet  Commonly known as:  IMITREX  Take 1 tab at onset of headaches.  If no improvement in 2 hours, take another.  Do not take more than 2 tabs in 24 hours.     tiotropium 18 mcg inhalation capsule  Commonly known as:  SPIRIVA  Inhale 1 capsule (18 mcg total) into the lungs once daily. Controller     tiZANidine 4 MG tablet  Commonly known as:  ZANAFLEX     topiramate 100 MG tablet  Commonly known as:  TOPAMAX  Take 3 tablets (300 mg total) by mouth 2 (two) times daily.     venlafaxine 75 MG 24 hr capsule  Commonly known as:  EFFEXOR-XR  Take 1 capsule (75 mg total) by mouth every evening.         * This list has 2 medication(s) that are the same as other medications prescribed for you. Read the directions carefully, and ask your doctor or other care provider to review them with you.                SOCIAL HISTORY:     Social History     Socioeconomic History    Marital status:      Spouse name: Not on file    Number of  children: Not on file    Years of education: Not on file    Highest education level: Not on file   Social Needs    Financial resource strain: Not on file    Food insecurity - worry: Not on file    Food insecurity - inability: Not on file    Transportation needs - medical: Not on file    Transportation needs - non-medical: Not on file   Occupational History    Not on file   Tobacco Use    Smoking status: Current Every Day Smoker     Packs/day: 1.00     Years: 15.00     Pack years: 15.00     Types: Cigarettes    Smokeless tobacco: Never Used   Substance and Sexual Activity    Alcohol use: No     Alcohol/week: 0.0 oz    Drug use: No    Sexual activity: Yes     Partners: Male     Birth control/protection: None   Other Topics Concern    Not on file   Social History Narrative    Not on file       FAMILY HISTORY:     Family History   Problem Relation Age of Onset    Hypertension Mother     Diabetes Mother     Colon cancer Mother 50    Colon polyps Mother     Heart disease Father     COPD Father     Heart attack Father     Hypertension Father     Ulcers Father     Cancer Maternal Grandmother     Breast cancer Maternal Grandmother     Colon cancer Maternal Grandmother     Colon polyps Maternal Grandmother     Celiac disease Neg Hx     Cirrhosis Neg Hx     Crohn's disease Neg Hx     Cystic fibrosis Neg Hx     Esophageal cancer Neg Hx     Hemochromatosis Neg Hx     Inflammatory bowel disease Neg Hx     Irritable bowel syndrome Neg Hx     Liver cancer Neg Hx     Liver disease Neg Hx     Rectal cancer Neg Hx     Stomach cancer Neg Hx     Ulcerative colitis Neg Hx     Jonnie's disease Neg Hx     Tuberculosis Neg Hx     Lymphoma Neg Hx     Scleroderma Neg Hx     Rheum arthritis Neg Hx     Melanoma Neg Hx     Multiple sclerosis Neg Hx     Psoriasis Neg Hx     Lupus Neg Hx     Skin cancer Neg Hx        REVIEW OF SYSTEMS:   Review of Systems   Constitutional: Negative for chills,  "diaphoresis and fever.   HENT: Negative for nosebleeds.    Eyes: Negative for blurred vision, double vision and photophobia.   Respiratory: Negative for hemoptysis, shortness of breath and wheezing.    Cardiovascular: Positive for chest pain and leg swelling. Negative for palpitations, orthopnea, claudication and PND.   Gastrointestinal: Negative for abdominal pain, blood in stool, heartburn, melena, nausea and vomiting.   Genitourinary: Negative for flank pain and hematuria.   Musculoskeletal: Negative for falls, myalgias and neck pain.   Skin: Negative for rash.   Neurological: Negative for dizziness, seizures, loss of consciousness, weakness and headaches.   Endo/Heme/Allergies: Negative for polydipsia. Does not bruise/bleed easily.   Psychiatric/Behavioral: Negative for depression and memory loss. The patient is not nervous/anxious.        PHYSICAL EXAM:     Vitals:    12/06/18 1504   BP: 134/78   Pulse: 86   Resp: 15    Body mass index is 63.73 kg/m².  Weight: (!) 168.4 kg (371 lb 4.1 oz)   Height: 5' 4" (162.6 cm)     Physical Exam   Constitutional: She is oriented to person, place, and time. She appears well-developed and well-nourished. She is cooperative.  Non-toxic appearance. No distress.   HENT:   Head: Normocephalic and atraumatic.   Eyes: Conjunctivae and EOM are normal. Pupils are equal, round, and reactive to light. No scleral icterus.   Neck: Trachea normal and normal range of motion. Neck supple. Normal carotid pulses and no JVD present. Carotid bruit is not present. No neck rigidity. No tracheal deviation and no edema present. No thyromegaly present.   Cardiovascular: Normal rate, regular rhythm, S1 normal and S2 normal. PMI is not displaced. Exam reveals no gallop and no friction rub.   No murmur heard.  Pulses:       Carotid pulses are 2+ on the right side, and 2+ on the left side.  Pulmonary/Chest: Effort normal and breath sounds normal. No stridor. No respiratory distress. She has no wheezes. " She has no rales. She exhibits no tenderness.   Abdominal: Soft. She exhibits no distension. There is no hepatosplenomegaly.   obese   Musculoskeletal: She exhibits edema. She exhibits no tenderness.   Feet:   Right Foot:   Skin Integrity: Negative for ulcer.   Left Foot:   Skin Integrity: Negative for ulcer.   Neurological: She is alert and oriented to person, place, and time. No cranial nerve deficit.   Skin: Skin is warm and dry. No rash noted. No erythema.   Psychiatric: She has a normal mood and affect. Her speech is normal and behavior is normal.   Vitals reviewed.      DATA:   EKG: (personally reviewed tracing)  11/6/18 SR 82, PRWP, NSSTTW changes, similar to 9/4/18    Laboratory:  CBC:  Recent Labs   Lab 09/04/18  1030 10/10/18  0953 11/05/18  1205   WHITE BLOOD CELL COUNT 6.32 3.49 L 5.30   HEMOGLOBIN 11.7 L 11.6 L 11.2 L   HEMATOCRIT 36.5 L 38.0 36.9 L   PLATELETS 289 319 309       CHEMISTRIES:  Recent Labs   Lab 07/29/16  1438 07/30/16  0534  04/15/17  1455  09/04/18  1030 10/10/18  0953 11/05/18  1205   GLUCOSE 113 H 99   < > 98   < > 212 H 101 93   SODIUM 137 137   < > 142   < > 137 139 135 L   POTASSIUM 3.6 3.3 L   < > 3.9   < > 4.1 3.7 3.6   BUN BLD 10 10   < > 8   < > 10 12 13   CREATININE 1.1 1.1   < > 0.9   < > 1.1 1.1 0.9   EGFR IF AFR[place] AMERICAN >60 >60   < > >60   < > >60 >60 >60.0   EGFR IF NON- >60 >60   < > >60   < > >60 >60 >60.0   CALCIUM 9.2 8.9   < > 8.9   < > 8.7 8.8 9.0   MAGNESIUM 2.1 2.1  --  2.0  --   --   --   --     < > = values in this interval not displayed.       CARDIAC BIOMARKERS:  Recent Labs   Lab 07/30/16  0534 07/30/16  1313 10/24/16  0331 04/05/18  0950 08/08/18  1149 09/04/18  1030   CPK  --  182 H  --  96 122  --    TROPONIN I <0.006  --  <0.006  --   --  <0.006       COAGS:  Recent Labs   Lab 04/28/16  1525 07/29/16  1438 10/24/16  0331   INR 1.0 1.0 1.0       LIPIDS/LFTS:  Recent Labs   Lab 07/30/16  0534  09/21/17  0920 01/22/18  0942   06/19/18  0655 08/08/18  1149 11/05/18  1205   CHOLESTEROL 145  --  228 H 201 H  --   --   --   --    TRIGLYCERIDES 65  --  101 104  --   --   --   --    HDL 52  --  51 56  --   --   --   --    LDL CHOLESTEROL 80.0  --  156.8 124.2  --   --   --   --    NON-HDL CHOLESTEROL 93  --  177 145  --   --   --   --    AST 12   < >  --   --    < > 11 9 L 11   ALT 12   < >  --   --    < > 11 9 L 11    < > = values in this interval not displayed.       Cardiovascular Testing:  L MPI 11/16/18 (images pers rev)  · The perfusion scan is free of evidence from myocardial ischemia or injury.  · There is a mild intensity defect in the wall of the left ventricle, secondary to breast attenuation.  · An ejection fraction of 67 % at rest    Echo 11/16/18  · Normal left ventricular systolic function. The estimated ejection fraction is 60%  · Normal LV diastolic function.  · Normal right ventricular systolic function.  · Trace mitral regurgitation.  · Moderate left atrial enlargement.    DSE 4/29/16  The patient received a graduated infusion of Dobutamine beginning at 10.00 mcg/Kg/min to a peak dose of 40 mcg/Kg/min, achieving a peak heart rate of 151 bpm, which is 90% of the age predicted maximum heart rate. The patient also received a total of 0.85 mg of Atropine.   Peak Imaging:  Images taken at peak infusion showed left ventricular function augmenting.   No dobutamine induced wall motion abnormalities were identified.   CONCLUSIONS     1 - Normal left ventricular systolic function (EF 60-65%).     2 - The estimated PA systolic pressure is 36 mmHg.     3 - Trivial mitral regurgitation.     4 - Trivial tricuspid regurgitation.     5 - Trivial pulmonic regurgitation.   No evidence of stress induced myocardial ischemia.    RC 3/12/18  #1. Right RC: 1.09. This is within normal limits.  #2. Left RC:1.04. This is within normal limits.    ASSESSMENT:   # CP, atyp.  MPI/echo 11/2018 neg.  # preop for EGD.  Pt is at low cardiac risk for this  procedure and anesthesia as deemed necessary.  # HTN, controlled  # DM  # BMI 64, up 1 unit vs last OV  # tob abuse, currently in smoking cessation class    PLAN:   Cont med rx  Consider statin rx given hx of DM  Consider SHARATH eval if not performed in past  Consider substitution of lasix for HCTZ if swelling (?subjective) persists   Diet/exericse/weight loss, ?bariatric eval  RTC prn    Abdiaziz Flores MD, FACC

## 2018-12-08 DIAGNOSIS — J30.2 SEASONAL ALLERGIES: ICD-10-CM

## 2018-12-08 DIAGNOSIS — F33.3 SEVERE EPISODE OF RECURRENT MAJOR DEPRESSIVE DISORDER, WITH PSYCHOTIC FEATURES: ICD-10-CM

## 2018-12-10 RX ORDER — MONTELUKAST SODIUM 10 MG/1
TABLET ORAL
Qty: 30 TABLET | Refills: 0 | Status: SHIPPED | OUTPATIENT
Start: 2018-12-10 | End: 2019-02-12 | Stop reason: SDUPTHER

## 2018-12-10 RX ORDER — QUETIAPINE FUMARATE 25 MG/1
TABLET, FILM COATED ORAL
Qty: 90 TABLET | Refills: 0 | Status: SHIPPED | OUTPATIENT
Start: 2018-12-10 | End: 2019-01-11 | Stop reason: SDUPTHER

## 2018-12-18 ENCOUNTER — OFFICE VISIT (OUTPATIENT)
Dept: FAMILY MEDICINE | Facility: CLINIC | Age: 48
End: 2018-12-18
Payer: MEDICARE

## 2018-12-18 VITALS
HEART RATE: 80 BPM | SYSTOLIC BLOOD PRESSURE: 144 MMHG | TEMPERATURE: 100 F | HEIGHT: 64 IN | RESPIRATION RATE: 20 BRPM | OXYGEN SATURATION: 98 % | WEIGHT: 293 LBS | DIASTOLIC BLOOD PRESSURE: 80 MMHG | BODY MASS INDEX: 50.02 KG/M2

## 2018-12-18 DIAGNOSIS — J32.1 CHRONIC FRONTAL SINUSITIS: Primary | ICD-10-CM

## 2018-12-18 PROCEDURE — 3079F DIAST BP 80-89 MM HG: CPT | Mod: CPTII,HCNC,S$GLB, | Performed by: NURSE PRACTITIONER

## 2018-12-18 PROCEDURE — 3077F SYST BP >= 140 MM HG: CPT | Mod: CPTII,HCNC,S$GLB, | Performed by: NURSE PRACTITIONER

## 2018-12-18 PROCEDURE — 3008F BODY MASS INDEX DOCD: CPT | Mod: CPTII,HCNC,S$GLB, | Performed by: NURSE PRACTITIONER

## 2018-12-18 PROCEDURE — 99999 PR PBB SHADOW E&M-EST. PATIENT-LVL III: CPT | Mod: PBBFAC,HCNC,, | Performed by: NURSE PRACTITIONER

## 2018-12-18 PROCEDURE — 99214 OFFICE O/P EST MOD 30 MIN: CPT | Mod: HCNC,S$GLB,, | Performed by: NURSE PRACTITIONER

## 2018-12-18 RX ORDER — AMOXICILLIN AND CLAVULANATE POTASSIUM 875; 125 MG/1; MG/1
1 TABLET, FILM COATED ORAL EVERY 12 HOURS
Qty: 20 TABLET | Refills: 0 | Status: SHIPPED | OUTPATIENT
Start: 2018-12-18 | End: 2018-12-28

## 2018-12-18 RX ORDER — LEVOCETIRIZINE DIHYDROCHLORIDE 5 MG/1
5 TABLET, FILM COATED ORAL NIGHTLY
Qty: 30 TABLET | Refills: 11 | Status: SHIPPED | OUTPATIENT
Start: 2018-12-18 | End: 2019-11-28 | Stop reason: SDUPTHER

## 2018-12-18 NOTE — PROGRESS NOTES
"Routine Office Visit    Patient Name: Yanni Kaiser    : 1970  MRN: [id number]    Chief Complaint:  Congestion, Sinus Pain    Subjective:  Yanni is a 48 y.o. female who presents today for:    1. One day history of ears hurting, sore throat, nonproductive cough, chills, and sinus pain. Associated symptoms include "a little" shortness of breath and "a little" wheezing. She has tried theraflu and throat lozenges for the symptoms. She states that she has some burning "at the back of my sinuses." She states that she has grandchildren that are sick at home. She denies any fevers at home. She does continue to smoke 3-4 cigarettes per day.     Past Medical History  Past Medical History:   Diagnosis Date    Allergy     Anemia     Asthma     Depression     Diabetes with neurologic complications     GERD (gastroesophageal reflux disease)     High cholesterol     Hyperprolactinemia     Hypertension     Pseudotumor cerebri     Seizures     Simple endometrial hyperplasia 2018    Sleep apnea     Smoker        Past Surgical History  Past Surgical History:   Procedure Laterality Date    BREAST CYST ASPIRATION       SECTION      X 1    CHOLECYSTECTOMY      COLONOSCOPY      HYSTEROSCOPY WITH DILATION AND CURETTAGE OF UTERUS N/A 10/16/2018    [facility]    HYSTEROSCOPY, WITH DILATION AND CURETTAGE OF UTERUS N/A 10/16/2018    Performed by [facility] Jr[facility] at [facility] OR    SINUS SURGERY      UPPER GASTROINTESTINAL ENDOSCOPY         Family History  Family History   Problem Relation Age of Onset    Hypertension Mother     Diabetes Mother     Colon cancer Mother 50    Colon polyps Mother     Heart disease Father     COPD Father     Heart attack Father     Hypertension Father     Ulcers Father     Cancer Maternal Grandmother     Breast cancer Maternal Grandmother     Colon cancer Maternal Grandmother     Colon polyps Maternal Grandmother     Celiac disease Neg Hx     Cirrhosis Neg Hx  "    Crohn's disease Neg Hx     Cystic fibrosis Neg Hx     Esophageal cancer Neg Hx     Hemochromatosis Neg Hx     Inflammatory bowel disease Neg Hx     Irritable bowel syndrome Neg Hx     Liver cancer Neg Hx     Liver disease Neg Hx     Rectal cancer Neg Hx     Stomach cancer Neg Hx     Ulcerative colitis Neg Hx     Jonnie's disease Neg Hx     Tuberculosis Neg Hx     Lymphoma Neg Hx     Scleroderma Neg Hx     Rheum arthritis Neg Hx     Melanoma Neg Hx     Multiple sclerosis Neg Hx     Psoriasis Neg Hx     Lupus Neg Hx     Skin cancer Neg Hx        Social History  Social History     Socioeconomic History    Marital status:      Spouse name: Not on file    Number of children: Not on file    Years of education: Not on file    Highest education level: Not on file   Social Needs    Financial resource strain: Not on file    Food insecurity - worry: Not on file    Food insecurity - inability: Not on file    Transportation needs - medical: Not on file    Transportation needs - non-medical: Not on file   Occupational History    Not on file   Tobacco Use    Smoking status: Current Every Day Smoker     Packs/day: 1.00     Years: 15.00     Pack years: 15.00     Types: Cigarettes    Smokeless tobacco: Never Used   Substance and Sexual Activity    Alcohol use: No     Alcohol/week: 0.0 oz    Drug use: No    Sexual activity: Yes     Partners: Male     Birth control/protection: None   Other Topics Concern    Not on file   Social History Narrative    Not on file       Current Medications  Current Outpatient Medications on File Prior to Visit   Medication Sig Dispense Refill    acetaZOLAMIDE (DIAMOX) 500 mg CpSR Take 1 capsule (500 mg total) by mouth 2 (two) times daily. 360 capsule 1    albuterol 90 mcg/actuation inhaler Inhale 2 puffs into the lungs every 6 (six) hours as needed for Wheezing. Rescue 1 Inhaler 0    albuterol-ipratropium (DUO-NEB) 2.5 mg-0.5 mg/3 mL nebulizer solution  Take 3 mLs by nebulization every 6 (six) hours as needed for Wheezing or Shortness of Breath. Rescue 100 mL 3    blood sugar diagnostic Strp 1 each by Misc.(Non-Drug; Combo Route) route 4 (four) times daily before meals and nightly. ACCU CHEK ELVIA PLUS METER 200 each 3    blood-glucose meter (ACCU-CHEK ELVIA PLUS METER) [facility] [place] TEST FOUR TIMES DAILY BEFORE MEALS AND EVERY NIGHT 90 each 1    budesonide-formoterol 160-4.5 mcg (SYMBICORT) 160-4.5 mcg/actuation HFAA Inhale 2 puffs into the lungs every 12 (twelve) hours. Controller 1 Inhaler 5    fluticasone (FLONASE) 50 mcg/actuation nasal spray 1 spray (50 mcg total) by Each Nare route once daily. 16 g 5    GAVILYTE-G 236-22.74-6.74 -5.86 gram suspension   0    hydroCHLOROthiazide (HYDRODIURIL) 25 MG tablet Take 1 tablet (25 mg total) by mouth once daily. 30 tablet 11    hydrOXYzine (ATARAX) 50 MG tablet Take 1-4 at night for itching. 120 tablet 3    lancets (ACCU-CHEK SOFTCLIX LANCETS) Misc 1 Device by Misc.(Non-Drug; Combo Route) route 4 (four) times daily. 200 each 3    losartan (COZAAR) 100 MG tablet TAKE 1 TABLET BY MOUTH EVERY DAY 90 tablet 0    medroxyPROGESTERone (PROVERA) 10 MG tablet Take 1 tablet (10 mg total) by mouth once daily. TAKE DAY 1-12 Q MONTH 12 tablet 12    metformin (GLUCOPHAGE-XR) 500 MG 24 hr tablet Take 2 tablets (1,000 mg total) by mouth 2 (two) times daily with meals. 360 tablet 0    mometasone 0.1% (ELOCON) 0.1 % cream       montelukast (SINGULAIR) 10 mg tablet TAKE 1 TABLET(10 MG) BY MOUTH EVERY EVENING 90 tablet 0    naproxen (EC NAPROSYN) 500 MG EC tablet Take 1 tablet (500 mg total) by mouth 2 (two) times daily. 30 tablet 3    nicotine polacrilex 2 MG Lozg Take 1 lozenge (2 mg total) by mouth as needed. 168 lozenge 0    ondansetron (ZOFRAN) 8 MG tablet Take 1 tablet (8 mg total) by mouth every 8 (eight) hours as needed for Nausea. 90 tablet 5    oxyCODONE-acetaminophen (PERCOCET) 5-325 mg per tablet Take 1 tablet by mouth  every 4 (four) hours as needed for Pain. 10 tablet 0    pantoprazole (PROTONIX) 40 MG tablet TK 1 T PO QD 1 HOUR B MEALS  5    QUEtiapine (SEROQUEL) 25 MG Tab TAKE 1 TABLET(25 MG) BY MOUTH EVERY EVENING 90 tablet 0    sumatriptan (IMITREX) 50 MG tablet Take 1 tab at onset of headaches.  If no improvement in 2 hours, take another.  Do not take more than 2 tabs in 24 hours. 9 tablet 5    tiotropium (SPIRIVA) 18 mcg inhalation capsule Inhale 1 capsule (18 mcg total) into the lungs once daily. Controller 30 capsule 11    tiZANidine (ZANAFLEX) 4 MG tablet Take 4 mg by mouth every 8 (eight) hours.       topiramate (TOPAMAX) 100 MG tablet Take 3 tablets (300 mg total) by mouth 2 (two) times daily. 180 tablet 5    venlafaxine (EFFEXOR-XR) 75 MG 24 hr capsule Take 1 capsule (75 mg total) by mouth every evening. 90 capsule 1    [DISCONTINUED] cetirizine (ZYRTEC) 10 MG tablet Take 1 tablet (10 mg total) by mouth 2 (two) times daily. 60 tablet 3    capsaicin 0.1 % Crea Apply 1 application topically 2 (two) times daily. 56.6 g 1    meloxicam (MOBIC) 15 MG tablet TAKE 1 TABLET(15 MG) BY MOUTH DAILY AS NEEDED FOR HEADACHE 90 tablet 1    montelukast (SINGULAIR) 10 mg tablet TAKE 1 TABLET(10 MG) BY MOUTH EVERY EVENING 30 tablet 0    plecanatide (TRULANCE) 3 mg Tab Take 3 mg by mouth once daily. 30 tablet 6     No current facility-administered medications on file prior to visit.        Allergies   Review of patient's allergies indicates:  No Known Allergies    Review of Systems (Pertinent positives)  Review of Systems   Constitutional: Positive for chills, fever and malaise/fatigue. Negative for diaphoresis and weight loss.   HENT: Positive for congestion, sinus pain and sore throat.    Eyes: Negative.    Respiratory: Positive for cough, shortness of breath and wheezing. Negative for hemoptysis, sputum production and stridor.    Cardiovascular: Negative for chest pain, palpitations, orthopnea, claudication and PND.  "  Gastrointestinal: Negative for abdominal pain, diarrhea, heartburn, nausea and vomiting.   Genitourinary: Negative.    Neurological: Negative.  Negative for weakness.   Endo/Heme/Allergies: Negative.    Psychiatric/Behavioral: Negative.      BP (!) 144/80 (BP Location: Right arm, Patient Position: Sitting, BP Method: Large (Manual))   Pulse 80   Temp 99.6 °F (37.6 °C) (Oral)   Resp 20   Ht 5' 4" (1.626 m)   Wt (!) 168 kg (370 lb 6 oz)   LMP 09/12/2018   SpO2 98%   BMI 63.57 kg/m²     Physical Exam   Constitutional: She is oriented to person, place, and time. She appears well-developed and well-nourished. She appears distressed.   HENT:   Head: Normocephalic and atraumatic.   Right Ear: Tympanic membrane, external ear and ear canal normal.   Left Ear: Tympanic membrane, external ear and ear canal normal.   Nose: Mucosal edema, rhinorrhea and sinus tenderness present. Right sinus exhibits frontal sinus tenderness. Right sinus exhibits no maxillary sinus tenderness. Left sinus exhibits frontal sinus tenderness. Left sinus exhibits no maxillary sinus tenderness.   Mouth/Throat: Uvula is midline and mucous membranes are normal. Posterior oropharyngeal erythema present.   Eyes: Conjunctivae are normal. Pupils are equal, round, and reactive to light.   Neck: Normal range of motion. Neck supple.   Cardiovascular: Normal rate, regular rhythm, normal heart sounds and intact distal pulses. Exam reveals no gallop and no friction rub.   No murmur heard.  Pulmonary/Chest: Effort normal and breath sounds normal. No stridor. No respiratory distress. She has no wheezes. She has no rales. She exhibits no tenderness.   Abdominal: Soft. Bowel sounds are normal.   Musculoskeletal: Normal range of motion.   Neurological: She is alert and oriented to person, place, and time.   Skin: Skin is warm and dry. Capillary refill takes less than 2 seconds. She is not diaphoretic.        Assessment/Plan:  Yanni Kaiser is a 48 " y.o. female who presents today for :    Yanni was seen today for nasal congestion, cough and sore throat.    Diagnoses and all orders for this visit:    Chronic frontal sinusitis  -     amoxicillin-clavulanate 875-125mg (AUGMENTIN) 875-125 mg per tablet; Take 1 tablet by mouth every 12 (twelve) hours. for 10 days  -     levocetirizine (XYZAL) 5 MG tablet; Take 1 tablet (5 mg total) by mouth every evening.  -     POCT Rapid Strep A  -     POCT Influenza A/B    BS clear - no appreciable wheezing/rhonci/rales on exam. Normal RR and sats.   Frontal sinuses very tender, will treat as exacerbation of chronic sinusitis.  Drink plenty of fluids, alternate tylenol/ibuprofen for aches/pain, get plenty of rest.  Rapid POCT tests negative.   F/U with PCP as needed.        My collaborating physician is Dr. Jose Rausch.

## 2018-12-26 ENCOUNTER — OFFICE VISIT (OUTPATIENT)
Dept: PODIATRY | Facility: CLINIC | Age: 48
End: 2018-12-26
Payer: MEDICARE

## 2018-12-26 VITALS — BODY MASS INDEX: 50.02 KG/M2 | WEIGHT: 293 LBS | HEIGHT: 64 IN

## 2018-12-26 DIAGNOSIS — G89.29 CHRONIC HEEL PAIN, UNSPECIFIED LATERALITY: ICD-10-CM

## 2018-12-26 DIAGNOSIS — E11.49 TYPE II DIABETES MELLITUS WITH NEUROLOGICAL MANIFESTATIONS: Primary | ICD-10-CM

## 2018-12-26 DIAGNOSIS — M79.673 CHRONIC HEEL PAIN, UNSPECIFIED LATERALITY: ICD-10-CM

## 2018-12-26 DIAGNOSIS — M72.2 PLANTAR FASCIITIS: ICD-10-CM

## 2018-12-26 PROCEDURE — 99213 OFFICE O/P EST LOW 20 MIN: CPT | Mod: HCNC,S$GLB,, | Performed by: PODIATRIST

## 2018-12-26 PROCEDURE — 99999 PR PBB SHADOW E&M-EST. PATIENT-LVL IV: CPT | Mod: PBBFAC,HCNC,, | Performed by: PODIATRIST

## 2018-12-26 PROCEDURE — 3008F BODY MASS INDEX DOCD: CPT | Mod: CPTII,HCNC,S$GLB, | Performed by: PODIATRIST

## 2018-12-26 PROCEDURE — 3044F HG A1C LEVEL LT 7.0%: CPT | Mod: CPTII,HCNC,S$GLB, | Performed by: PODIATRIST

## 2018-12-26 NOTE — PROGRESS NOTES
Subjective:      Patient ID: Yanni Kaiser is a 48 y.o. female.    Chief Complaint: Foot Pain (PCP Dr Gordillo ) and Foot Problem    Yanni Kaiser is a 48 y.o. femalewho presents to the clinic for evaluation and treatment of high risk feet. Yanni Kaiser   has a past medical history of Allergy, Anemia, Asthma, Depression, Diabetes with neurologic complications, GERD (gastroesophageal reflux disease), High cholesterol, Hyperprolactinemia, Hypertension, Pseudotumor cerebri, Seizures, Simple endometrial hyperplasia (2018), Sleep apnea, and Smoker.   The patient's chief complaint is the feeling of swelling to the feet and continuedright heel pain.     8/8/18: Presents for worsening LEFT heel pain. Reports pain to right heel has improved. Currently on percocet prescribed by pain management.     9/20/18: Reports pain is 20% improved to left heel. States she has started wearing tennis shoes. S/p injection LEFT heel. Reports pain to right heel well controlled.     10/30/18: Reports Continued pain to B/L heel L>R. Completed one session of physical therapy however was not able to f/u as her  had recent surgery. Also reports discoloration of skin to site of prior left heel injection.     12/26/18: Reports continued left heel pain 8/10. Has not been able to go to PT due to personal issues. Presents today in diabetic she states are 4 years old. Also reports discoloration to skin right ankle.     PCP: Carlyle Gordillo MD    Date Last Seen by PCP:   Chief Complaint   Patient presents with    Foot Pain     PCP Dr Gordillo     Foot Problem       Current shoe gear:   champions boat shoes with inserts     This patient has documented high risk feet requiring routine maintenance secondary to diabetes mellitis and those secondary complications of diabetes, as mentioned..      Hemoglobin A1C   Date Value Ref Range Status   07/30/2018 5.8 (H) 4.0 - 5.6 % Final     Comment:     ADA Screening Guidelines:  5.7-6.4%   Consistent with prediabetes  >or=6.5%  Consistent with diabetes  High levels of fetal hemoglobin interfere with the HbA1C  assay. Heterozygous hemoglobin variants (HbS, HgC, etc)do  not significantly interfere with this assay.   However, presence of multiple variants may affect accuracy.     01/22/2018 5.8 (H) 4.0 - 5.6 % Final     Comment:     According to ADA guidelines, hemoglobin A1c <7.0% represents  optimal control in non-pregnant diabetic patients. Different  metrics may apply to specific patient populations.   Standards of Medical Care in Diabetes-2016.  For the purpose of screening for the presence of diabetes:  <5.7%     Consistent with the absence of diabetes  5.7-6.4%  Consistent with increasing risk for diabetes   (prediabetes)  >or=6.5%  Consistent with diabetes  Currently, no consensus exists for use of hemoglobin A1c  for diagnosis of diabetes for children.  This Hemoglobin A1c assay has significant interference with fetal   hemoglobin   (HbF). The results are invalid for patients with abnormal amounts of   HbF,   including those with known Hereditary Persistence   of Fetal Hemoglobin. Heterozygous hemoglobin variants (HbAS, HbAC,   HbAD, HbAE, HbA2) do not significantly interfere with this assay;   however, presence of multiple variants in a sample may impact the %   interference.     06/26/2017 6.0 (H) 4.0 - 5.6 % Final     Comment:     According to ADA guidelines, hemoglobin A1c <7.0% represents  optimal control in non-pregnant diabetic patients. Different  metrics may apply to specific patient populations.   Standards of Medical Care in Diabetes-2016.  For the purpose of screening for the presence of diabetes:  <5.7%     Consistent with the absence of diabetes  5.7-6.4%  Consistent with increasing risk for diabetes   (prediabetes)  >or=6.5%  Consistent with diabetes  Currently, no consensus exists for use of hemoglobin A1c  for diagnosis of diabetes for children.  This Hemoglobin A1c assay has  significant interference with fetal   hemoglobin   (HbF). The results are invalid for patients with abnormal amounts of   HbF,   including those with known Hereditary Persistence   of Fetal Hemoglobin. Heterozygous hemoglobin variants (HbAS, HbAC,   HbAD, HbAE, HbA2) do not significantly interfere with this assay;   however, presence of multiple variants in a sample may impact the %   interference.       Patient Active Problem List   Diagnosis    Mild intermittent asthma    SHARATH (obstructive sleep apnea)    Leg varices    Low back pain    Migraine    Morbid obesity with BMI of 60.0-69.9, adult    Tobacco use disorder, moderate, dependence    Exacerbation of asthma    Anemia of chronic disease    Mild protein malnutrition    Weakness    Allergic rhinitis    Idiopathic intracranial hypertension    Essential hypertension    Combined hyperlipidemia associated with type 2 diabetes mellitus    Depression    Hyperlipidemia    GERD (gastroesophageal reflux disease)    Epilepsy    Plantar fasciitis, bilateral    Numbness of right hand    Muscle tightness    Muscle weakness    Type 2 diabetes mellitus with stage 3 chronic kidney disease, with long-term current use of insulin    Recurrent major depressive disorder, in full remission    Simple chronic bronchitis    Other chronic pain    Suicide attempt    Asthma with severe exacerbation    Opioid dependence    Lumbar stenosis    Chest pain    Foot pain, bilateral    Antalgic gait    Dysfunctional uterine bleeding    DUB (dysfunctional uterine bleeding)    S/P D&C (status post dilation and curettage)    Simple endometrial hyperplasia     Current Outpatient Medications on File Prior to Visit   Medication Sig Dispense Refill    acetaZOLAMIDE (DIAMOX) 500 mg CpSR Take 1 capsule (500 mg total) by mouth 2 (two) times daily. 360 capsule 1    albuterol 90 mcg/actuation inhaler Inhale 2 puffs into the lungs every 6 (six) hours as needed for  Wheezing. Rescue 1 Inhaler 0    albuterol-ipratropium (DUO-NEB) 2.5 mg-0.5 mg/3 mL nebulizer solution Take 3 mLs by nebulization every 6 (six) hours as needed for Wheezing or Shortness of Breath. Rescue 100 mL 3    amoxicillin-clavulanate 875-125mg (AUGMENTIN) 875-125 mg per tablet Take 1 tablet by mouth every 12 (twelve) hours. for 10 days 20 tablet 0    blood sugar diagnostic Strp 1 each by Misc.(Non-Drug; Combo Route) route 4 (four) times daily before meals and nightly. ACCU CHEK ELVIA PLUS METER 200 each 3    blood-glucose meter (ACCU-CHEK ELVIA PLUS METER) [facility] [place] FOUR TIMES DAILY BEFORE MEALS AND EVERY NIGHT 90 each 1    budesonide-formoterol 160-4.5 mcg (SYMBICORT) 160-4.5 mcg/actuation HFAA Inhale 2 puffs into the lungs every 12 (twelve) hours. Controller 1 Inhaler 5    capsaicin 0.1 % Crea Apply 1 application topically 2 (two) times daily. 56.6 g 1    fluticasone (FLONASE) 50 mcg/actuation nasal spray 1 spray (50 mcg total) by Each Nare route once daily. 16 g 5    GAVILYTE-G 236-22.74-6.74 -5.86 gram suspension   0    hydroCHLOROthiazide (HYDRODIURIL) 25 MG tablet Take 1 tablet (25 mg total) by mouth once daily. 30 tablet 11    hydrOXYzine (ATARAX) 50 MG tablet Take 1-4 at night for itching. 120 tablet 3    lancets (ACCU-CHEK SOFTCLIX LANCETS) Misc 1 Device by Misc.(Non-Drug; Combo Route) route 4 (four) times daily. 200 each 3    levocetirizine (XYZAL) 5 MG tablet Take 1 tablet (5 mg total) by mouth every evening. 30 tablet 11    losartan (COZAAR) 100 MG tablet TAKE 1 TABLET BY MOUTH EVERY DAY 90 tablet 0    medroxyPROGESTERone (PROVERA) 10 MG tablet Take 1 tablet (10 mg total) by mouth once daily. TAKE DAY 1-12 Q MONTH 12 tablet 12    meloxicam (MOBIC) 15 MG tablet TAKE 1 TABLET(15 MG) BY MOUTH DAILY AS NEEDED FOR HEADACHE 90 tablet 1    metformin (GLUCOPHAGE-XR) 500 MG 24 hr tablet Take 2 tablets (1,000 mg total) by mouth 2 (two) times daily with meals. 360 tablet 0    mometasone 0.1%  (ELOCON) 0.1 % cream       montelukast (SINGULAIR) 10 mg tablet TAKE 1 TABLET(10 MG) BY MOUTH EVERY EVENING 90 tablet 0    montelukast (SINGULAIR) 10 mg tablet TAKE 1 TABLET(10 MG) BY MOUTH EVERY EVENING 30 tablet 0    naproxen (EC NAPROSYN) 500 MG EC tablet Take 1 tablet (500 mg total) by mouth 2 (two) times daily. 30 tablet 3    nicotine polacrilex 2 MG Lozg Take 1 lozenge (2 mg total) by mouth as needed. 168 lozenge 0    ondansetron (ZOFRAN) 8 MG tablet Take 1 tablet (8 mg total) by mouth every 8 (eight) hours as needed for Nausea. 90 tablet 5    oxyCODONE-acetaminophen (PERCOCET) 5-325 mg per tablet Take 1 tablet by mouth every 4 (four) hours as needed for Pain. 10 tablet 0    pantoprazole (PROTONIX) 40 MG tablet TK 1 T PO QD 1 HOUR B MEALS  5    plecanatide (TRULANCE) 3 mg Tab Take 3 mg by mouth once daily. 30 tablet 6    QUEtiapine (SEROQUEL) 25 MG Tab TAKE 1 TABLET(25 MG) BY MOUTH EVERY EVENING 90 tablet 0    sumatriptan (IMITREX) 50 MG tablet Take 1 tab at onset of headaches.  If no improvement in 2 hours, take another.  Do not take more than 2 tabs in 24 hours. 9 tablet 5    tiotropium (SPIRIVA) 18 mcg inhalation capsule Inhale 1 capsule (18 mcg total) into the lungs once daily. Controller 30 capsule 11    tiZANidine (ZANAFLEX) 4 MG tablet Take 4 mg by mouth every 8 (eight) hours.       topiramate (TOPAMAX) 100 MG tablet Take 3 tablets (300 mg total) by mouth 2 (two) times daily. 180 tablet 5    venlafaxine (EFFEXOR-XR) 75 MG 24 hr capsule Take 1 capsule (75 mg total) by mouth every evening. 90 capsule 1     No current facility-administered medications on file prior to visit.      Review of patient's allergies indicates:  No Known Allergies  Past Surgical History:   Procedure Laterality Date    BREAST CYST ASPIRATION       SECTION      X 1    CHOLECYSTECTOMY      COLONOSCOPY      HYSTEROSCOPY, WITH DILATION AND CURETTAGE OF UTERUS N/A 10/16/2018    Performed by Glenn Black  [facility]    SINUS SURGERY      UPPER GASTROINTESTINAL ENDOSCOPY       Family History   Problem Relation Age of Onset    Hypertension Mother     Diabetes Mother     Colon cancer Mother 50    Colon polyps Mother     Heart disease Father     COPD Father     Heart attack Father     Hypertension Father     Ulcers Father     Cancer Maternal Grandmother     Breast cancer Maternal Grandmother     Colon cancer Maternal Grandmother     Colon polyps Maternal Grandmother     Celiac disease Neg Hx     Cirrhosis Neg Hx     Crohn's disease Neg Hx     Cystic fibrosis Neg Hx     Esophageal cancer Neg Hx     Hemochromatosis Neg Hx     Inflammatory bowel disease Neg Hx     Irritable bowel syndrome Neg Hx     Liver cancer Neg Hx     Liver disease Neg Hx     Rectal cancer Neg Hx     Stomach cancer Neg Hx     Ulcerative colitis Neg Hx     Jonnie's disease Neg Hx     Tuberculosis Neg Hx     Lymphoma Neg Hx     Scleroderma Neg Hx     Rheum arthritis Neg Hx     Melanoma Neg Hx     Multiple sclerosis Neg Hx     Psoriasis Neg Hx     Lupus Neg Hx     Skin cancer Neg Hx      Social History     Socioeconomic History    Marital status:      Spouse name: Not on file    Number of children: Not on file    Years of education: Not on file    Highest education level: Not on file   Social Needs    Financial resource strain: Not on file    Food insecurity - worry: Not on file    Food insecurity - inability: Not on file    Transportation needs - medical: Not on file    Transportation needs - non-medical: Not on file   Occupational History    Not on file   Tobacco Use    Smoking status: Current Every Day Smoker     Packs/day: 1.00     Years: 15.00     Pack years: 15.00     Types: Cigarettes    Smokeless tobacco: Never Used   Substance and Sexual Activity    Alcohol use: No     Alcohol/week: 0.0 oz    Drug use: No    Sexual activity: Yes     Partners: Male     Birth control/protection:  "None   Other Topics Concern    Not on file   Social History Narrative    Not on file        Review of Systems   Constitution: Negative for chills, fever and weakness.   Cardiovascular: Positive for leg swelling. Negative for chest pain and claudication.   Respiratory: Negative for cough and shortness of breath.    Skin: Positive for dry skin and nail changes. Negative for itching and rash.   Musculoskeletal: Positive for arthritis, joint pain, muscle cramps and myalgias. Negative for falls, joint swelling and muscle weakness.   Gastrointestinal: Negative for diarrhea, nausea and vomiting.   Neurological: Positive for numbness and paresthesias. Negative for tremors.   Psychiatric/Behavioral: Negative for altered mental status and hallucinations.           Objective:       Vitals:    12/26/18 0936   Weight: (!) 167.8 kg (370 lb)   Height: 5' 4" (1.626 m)   PainSc: 0-No pain       Physical Exam   Constitutional:   General: Pt. is well-developed, well-nourished, appears stated age, in no acute distress, alert and oriented x 3. No evidence of depression, anxiety, or agitation. Calm, cooperative, and communicative. Appropriate interactions and affect.       Cardiovascular:   Pulses:       Dorsalis pedis pulses are 1+ on the right side, and 1+ on the left side.        Posterior tibial pulses are 1+ on the right side, and 1+ on the left side.   Dorsalis pedis and posterior tibial pulses are diminished Bilaterally. Toes are cool to touch. Feet are warm proximally.There is decreased digital hair. Skin is atrophic, hyperpigmented, and mildly edematous       Musculoskeletal:        Right ankle: She exhibits decreased range of motion and swelling. No tenderness. No lateral malleolus, no medial malleolus, no CF ligament, no head of 5th metatarsal and no proximal fibula tenderness found. Achilles tendon exhibits no pain and no defect.        Left ankle: She exhibits decreased range of motion and swelling. No tenderness. No " lateral malleolus, no medial malleolus, no head of 5th metatarsal and no proximal fibula tenderness found. Achilles tendon exhibits no pain and no defect.        Right foot: There is tenderness (Plantar medial calcaneal tuber. mild pain to palpation of 5th met hase and resistance to eversion). There is normal range of motion.        Left foot: There is tenderness (plantar calcaneal medial tuber. mild pain with palpation of 5th met base and eversion resistance. ). There is normal range of motion.   Biomechanical exam: Pain on palpation bilateral medial calcaneal tubercle at origin of plantar fascia. L>R,  There is equinus deformity bilateral with decreased dorsiflexion at the ankle joint bilateral. No tenderness with compression of heel. Negative tinels sign.     Muscle strength is 5/5 in all groups bilaterally.    Decreased stride, station of gait.  apropulsive toe off.  Increased angle and base of gait.    Patient has hammertoes of digits 2-5 bilateral partially reducible without symptom today.    Decreased first MPJ range of motion both weightbearing and nonweightbearing, no crepitus observed the first MP joint,+ dorsal flag sign. Mild  bunion deformity is observed .    Decreased arch height noted b/l.    Neurological: A sensory deficit is present.   [place] 5.07 monofilamant testing is diminished Maciej feet. Sharp/dull sensation diminished Bilaterally.     Paresthesias, and hyperesthesia bilateral feet with no clearly identified trigger or source.     Skin: Skin is warm, dry and intact. No abrasion, no ecchymosis, no lesion and no rash noted. She is not diaphoretic. No cyanosis or erythema. No pallor. Nails show no clubbing.   Toenails 1-5 bilaterally neatly trimmed and painted thickened by 2-3 mm,     Interdigital Spaces clean, dry and without evidence of break in skin integrity      Hyperpigmentation noted to left medial heel. No ulceration noted. No drainage noted.    Psychiatric: She has a normal mood  and affect. Her speech is normal.   Nursing note and vitals reviewed.            Assessment:       Encounter Diagnoses   Name Primary?    Type II diabetes mellitus with neurological manifestations Yes    Chronic heel pain, unspecified laterality     Plantar fasciitis          Plan:       Yanni was seen today for foot pain and foot problem.    Diagnoses and all orders for this visit:    Type II diabetes mellitus with neurological manifestations  -     DIABETIC SHOES FOR HOME USE    Chronic heel pain, unspecified laterality  -     DIABETIC SHOES FOR HOME USE    Plantar fasciitis  -     DIABETIC SHOES FOR HOME USE      I counseled the patient on her conditions, their implications and medical management.     Rx diabetic shoes with custom molded inserts to be worn at all times while ambulating. Prescription provided with list of local retailers.     Heel lifts dispensed.     - Shoe inspection. Diabetic Foot Education. Patient reminded of the importance of good nutrition and blood sugar control to help prevent podiatric complications of diabetes. Patient instructed on proper foot hygeine. We discussed wearing proper shoe gear, daily foot inspections, never walking without protective shoe gear, never putting sharp instruments to feet.     Discussed different treatment options for foot pain. I gave written and verbal instructions on stretching exercises. Patient expressed understanding. Discussed icing the affected area as needed and also wearing appropriate shoe gear and avoiding flats, slippers, sandals, and going barefoot. My recommendation for OTC supports is [employer]. Patient instructed on adequate icing techniques. Patient should ice the affected area at least once per day x 10 minutes for 10 days . I advised the patient that extra icing would also be beneficial to ensure adequate anti inflammatory effect. We also discussed cortisone injections and NSAID therapy.     Use previously dispensed CAM boot  PRN.     Night splint dispensed.- continue     Continue with PT    RTC in 3 months sooner PRN    aLura Barnes DPM

## 2018-12-27 DIAGNOSIS — R51.9 CHRONIC NONINTRACTABLE HEADACHE, UNSPECIFIED HEADACHE TYPE: ICD-10-CM

## 2018-12-27 DIAGNOSIS — G89.29 CHRONIC NONINTRACTABLE HEADACHE, UNSPECIFIED HEADACHE TYPE: ICD-10-CM

## 2018-12-27 DIAGNOSIS — G93.2 IIH (IDIOPATHIC INTRACRANIAL HYPERTENSION): ICD-10-CM

## 2018-12-27 RX ORDER — VENLAFAXINE HYDROCHLORIDE 75 MG/1
CAPSULE, EXTENDED RELEASE ORAL
Qty: 90 CAPSULE | Refills: 1 | Status: SHIPPED | OUTPATIENT
Start: 2018-12-27 | End: 2019-04-02 | Stop reason: SDUPTHER

## 2019-01-03 ENCOUNTER — CLINICAL SUPPORT (OUTPATIENT)
Dept: SMOKING CESSATION | Facility: CLINIC | Age: 49
End: 2019-01-03
Payer: COMMERCIAL

## 2019-01-03 DIAGNOSIS — F17.200 NICOTINE DEPENDENCE: Primary | ICD-10-CM

## 2019-01-03 PROCEDURE — 99407 BEHAV CHNG SMOKING > 10 MIN: CPT | Mod: S$GLB,,, | Performed by: INTERNAL MEDICINE

## 2019-01-03 PROCEDURE — 99407 PR TOBACCO USE CESSATION INTENSIVE >10 MINUTES: ICD-10-PCS | Mod: S$GLB,,, | Performed by: INTERNAL MEDICINE

## 2019-01-03 RX ORDER — LOSARTAN POTASSIUM 100 MG/1
TABLET ORAL
Qty: 90 TABLET | Refills: 0 | Status: SHIPPED | OUTPATIENT
Start: 2019-01-03 | End: 2019-04-04 | Stop reason: SDUPTHER

## 2019-01-03 NOTE — PROGRESS NOTES
Spoke with patient today in regard to smoking cessation progress for 3/6 month telephone follow up, she states not tobacco free. Patient states she has cut down on her cigarette intake to about 0.5 ppd using the patch. Commended patient on the accomplishment thus far. She states not ready to return to the program at this time. Informed patient of benefit period, future follow up, and contact information if any further help or support is needed. Will complete smart form for 3 and 6 month follow up on Quit attempt #1.

## 2019-01-10 ENCOUNTER — PATIENT MESSAGE (OUTPATIENT)
Dept: ADMINISTRATIVE | Facility: OTHER | Age: 49
End: 2019-01-10

## 2019-01-11 ENCOUNTER — OFFICE VISIT (OUTPATIENT)
Dept: FAMILY MEDICINE | Facility: CLINIC | Age: 49
End: 2019-01-11
Payer: MEDICARE

## 2019-01-11 VITALS
OXYGEN SATURATION: 95 % | TEMPERATURE: 99 F | HEIGHT: 64 IN | HEART RATE: 83 BPM | RESPIRATION RATE: 16 BRPM | WEIGHT: 293 LBS | SYSTOLIC BLOOD PRESSURE: 140 MMHG | DIASTOLIC BLOOD PRESSURE: 90 MMHG | BODY MASS INDEX: 50.02 KG/M2

## 2019-01-11 DIAGNOSIS — R51.9 CHRONIC NONINTRACTABLE HEADACHE, UNSPECIFIED HEADACHE TYPE: ICD-10-CM

## 2019-01-11 DIAGNOSIS — E11.9 TYPE 2 DIABETES MELLITUS: ICD-10-CM

## 2019-01-11 DIAGNOSIS — G89.29 CHRONIC NONINTRACTABLE HEADACHE, UNSPECIFIED HEADACHE TYPE: ICD-10-CM

## 2019-01-11 DIAGNOSIS — F33.3 SEVERE EPISODE OF RECURRENT MAJOR DEPRESSIVE DISORDER, WITH PSYCHOTIC FEATURES: ICD-10-CM

## 2019-01-11 DIAGNOSIS — J32.1 CHRONIC FRONTAL SINUSITIS: Primary | ICD-10-CM

## 2019-01-11 PROCEDURE — 99999 PR PBB SHADOW E&M-EST. PATIENT-LVL III: CPT | Mod: PBBFAC,HCNC,, | Performed by: FAMILY MEDICINE

## 2019-01-11 PROCEDURE — 3008F BODY MASS INDEX DOCD: CPT | Mod: CPTII,HCNC,S$GLB, | Performed by: FAMILY MEDICINE

## 2019-01-11 PROCEDURE — 99214 PR OFFICE/OUTPT VISIT, EST, LEVL IV, 30-39 MIN: ICD-10-PCS | Mod: HCNC,S$GLB,, | Performed by: FAMILY MEDICINE

## 2019-01-11 PROCEDURE — 3077F PR MOST RECENT SYSTOLIC BLOOD PRESSURE >= 140 MM HG: ICD-10-PCS | Mod: CPTII,HCNC,S$GLB, | Performed by: FAMILY MEDICINE

## 2019-01-11 PROCEDURE — 3077F SYST BP >= 140 MM HG: CPT | Mod: CPTII,HCNC,S$GLB, | Performed by: FAMILY MEDICINE

## 2019-01-11 PROCEDURE — 3008F PR BODY MASS INDEX (BMI) DOCUMENTED: ICD-10-PCS | Mod: CPTII,HCNC,S$GLB, | Performed by: FAMILY MEDICINE

## 2019-01-11 PROCEDURE — 99214 OFFICE O/P EST MOD 30 MIN: CPT | Mod: HCNC,S$GLB,, | Performed by: FAMILY MEDICINE

## 2019-01-11 PROCEDURE — 99999 PR PBB SHADOW E&M-EST. PATIENT-LVL III: ICD-10-PCS | Mod: PBBFAC,HCNC,, | Performed by: FAMILY MEDICINE

## 2019-01-11 PROCEDURE — 3080F PR MOST RECENT DIASTOLIC BLOOD PRESSURE >= 90 MM HG: ICD-10-PCS | Mod: CPTII,HCNC,S$GLB, | Performed by: FAMILY MEDICINE

## 2019-01-11 PROCEDURE — 3080F DIAST BP >= 90 MM HG: CPT | Mod: CPTII,HCNC,S$GLB, | Performed by: FAMILY MEDICINE

## 2019-01-11 RX ORDER — DOXYCYCLINE 100 MG/1
100 CAPSULE ORAL EVERY 12 HOURS
Qty: 20 CAPSULE | Refills: 0 | Status: SHIPPED | OUTPATIENT
Start: 2019-01-11 | End: 2019-01-21

## 2019-01-11 RX ORDER — QUETIAPINE FUMARATE 25 MG/1
TABLET, FILM COATED ORAL
Qty: 90 TABLET | Refills: 0 | Status: SHIPPED | OUTPATIENT
Start: 2019-01-11 | End: 2019-06-03 | Stop reason: SDUPTHER

## 2019-01-11 RX ORDER — HYDROXYZINE HYDROCHLORIDE 50 MG/1
TABLET, FILM COATED ORAL
Qty: 120 TABLET | Refills: 3 | Status: SHIPPED | OUTPATIENT
Start: 2019-01-11 | End: 2019-06-03 | Stop reason: SDUPTHER

## 2019-01-11 RX ORDER — METFORMIN HYDROCHLORIDE 500 MG/1
1000 TABLET, EXTENDED RELEASE ORAL 2 TIMES DAILY WITH MEALS
Qty: 360 TABLET | Refills: 0 | Status: SHIPPED | OUTPATIENT
Start: 2019-01-11 | End: 2019-04-04 | Stop reason: SDUPTHER

## 2019-01-11 RX ORDER — SUMATRIPTAN 50 MG/1
TABLET, FILM COATED ORAL
Qty: 9 TABLET | Refills: 5 | Status: SHIPPED | OUTPATIENT
Start: 2019-01-11 | End: 2020-01-27 | Stop reason: SDUPTHER

## 2019-01-11 RX ORDER — OXYCODONE AND ACETAMINOPHEN 10; 325 MG/1; MG/1
TABLET ORAL
Refills: 0 | Status: ON HOLD | COMMUNITY
Start: 2018-12-21 | End: 2020-01-18 | Stop reason: HOSPADM

## 2019-01-11 NOTE — PROGRESS NOTES
Routine Office Visit    Patient Name: Yanni Kaiser    : 1970  MRN: [id number]    Subjective:  Yanni is a 48 y.o. female who presents today for:    1. Sinus pain  Patient presenting today with continued left frontal sinus pain.  She was treated for allergies several weeks ago, but states this was not present at this time.  She has not had any fevers.  She does smoke despite having asthma.  There has been no rashes, n/v/d.      Past Medical History  Past Medical History:   Diagnosis Date    Allergy     Anemia     Asthma     Depression     Diabetes with neurologic complications     GERD (gastroesophageal reflux disease)     High cholesterol     Hyperprolactinemia     Hypertension     Pseudotumor cerebri     Seizures     Simple endometrial hyperplasia 2018    Sleep apnea     Smoker        Past Surgical History  Past Surgical History:   Procedure Laterality Date    BREAST CYST ASPIRATION       SECTION      X 1    CHOLECYSTECTOMY      COLONOSCOPY      HYSTEROSCOPY, WITH DILATION AND CURETTAGE OF UTERUS N/A 10/16/2018    Performed by Glenn Black Jr., MD at [facility] ([facility]    SINUS SURGERY      UPPER GASTROINTESTINAL ENDOSCOPY         Family History  Family History   Problem Relation Age of Onset    Hypertension Mother     Diabetes Mother     Colon cancer Mother 50    Colon polyps Mother     Heart disease Father     COPD Father     Heart attack Father     Hypertension Father     Ulcers Father     Cancer Maternal Grandmother     Breast cancer Maternal Grandmother     Colon cancer Maternal Grandmother     Colon polyps Maternal Grandmother     Celiac disease Neg Hx     Cirrhosis Neg Hx     Crohn's disease Neg Hx     Cystic fibrosis Neg Hx     Esophageal cancer Neg Hx     Hemochromatosis Neg Hx     Inflammatory bowel disease Neg Hx     Irritable bowel syndrome Neg Hx     Liver cancer Neg Hx     Liver disease Neg Hx     Rectal cancer Neg Hx     Stomach cancer Neg  Hx     Ulcerative colitis Neg Hx     Jonnie's disease Neg Hx     Tuberculosis Neg Hx     Lymphoma Neg Hx     Scleroderma Neg Hx     Rheum arthritis Neg Hx     Melanoma Neg Hx     Multiple sclerosis Neg Hx     Psoriasis Neg Hx     Lupus Neg Hx     Skin cancer Neg Hx        Social History  Social History     Socioeconomic History    Marital status:      Spouse name: Not on file    Number of children: Not on file    Years of education: Not on file    Highest education level: Not on file   Social Needs    Financial resource strain: Not on file    Food insecurity - worry: Not on file    Food insecurity - inability: Not on file    Transportation needs - medical: Not on file    Transportation needs - non-medical: Not on file   Occupational History    Not on file   Tobacco Use    Smoking status: Current Every Day Smoker     Packs/day: 1.00     Years: 15.00     Pack years: 15.00     Types: Cigarettes    Smokeless tobacco: Never Used   Substance and Sexual Activity    Alcohol use: No     Alcohol/week: 0.0 oz    Drug use: No    Sexual activity: Yes     Partners: Male     Birth control/protection: None   Other Topics Concern    Not on file   Social History Narrative    Not on file       Current Medications  Current Outpatient Medications on File Prior to Visit   Medication Sig Dispense Refill    acetaZOLAMIDE (DIAMOX) 500 mg CpSR Take 1 capsule (500 mg total) by mouth 2 (two) times daily. 360 capsule 1    albuterol 90 mcg/actuation inhaler Inhale 2 puffs into the lungs every 6 (six) hours as needed for Wheezing. Rescue 1 Inhaler 0    albuterol-ipratropium (DUO-NEB) 2.5 mg-0.5 mg/3 mL nebulizer solution Take 3 mLs by nebulization every 6 (six) hours as needed for Wheezing or Shortness of Breath. Rescue 100 mL 3    blood sugar diagnostic Strp 1 each by Misc.(Non-Drug; Combo Route) route 4 (four) times daily before meals and nightly. ACCU CHEK ELVIA PLUS METER 200 each 3    blood-glucose  meter (ACCU-CHEK ELVIA PLUS METER) Misc TEST FOUR TIMES DAILY BEFORE MEALS AND EVERY NIGHT 90 each 1    budesonide-formoterol 160-4.5 mcg (SYMBICORT) 160-4.5 mcg/actuation HFAA Inhale 2 puffs into the lungs every 12 (twelve) hours. Controller 1 Inhaler 5    capsaicin 0.1 % Crea Apply 1 application topically 2 (two) times daily. 56.6 g 1    fluticasone (FLONASE) 50 mcg/actuation nasal spray 1 spray (50 mcg total) by Each Nare route once daily. 16 g 5    GAVILYTE-G 236-22.74-6.74 -5.86 gram suspension   0    hydroCHLOROthiazide (HYDRODIURIL) 25 MG tablet Take 1 tablet (25 mg total) by mouth once daily. 30 tablet 11    lancets (ACCU-CHEK SOFTCLIX LANCETS) Misc 1 Device by Misc.(Non-Drug; Combo Route) route 4 (four) times daily. 200 each 3    levocetirizine (XYZAL) 5 MG tablet Take 1 tablet (5 mg total) by mouth every evening. 30 tablet 11    losartan (COZAAR) 100 MG tablet TAKE 1 TABLET BY MOUTH EVERY DAY 90 tablet 0    medroxyPROGESTERone (PROVERA) 10 MG tablet Take 1 tablet (10 mg total) by mouth once daily. TAKE DAY 1-12 Q MONTH 12 tablet 12    mometasone 0.1% (ELOCON) 0.1 % cream       naproxen (EC NAPROSYN) 500 MG EC tablet Take 1 tablet (500 mg total) by mouth 2 (two) times daily. 30 tablet 3    nicotine polacrilex 2 MG Lozg Take 1 lozenge (2 mg total) by mouth as needed. 168 lozenge 0    ondansetron (ZOFRAN) 8 MG tablet Take 1 tablet (8 mg total) by mouth every 8 (eight) hours as needed for Nausea. 90 tablet 5    oxyCODONE-acetaminophen (PERCOCET)  mg per tablet TAKE 1 TABLET BY MOUTH EVERY 8 HOURS AS NEEDED. MAY CAUSE DROWSINESS  0    pantoprazole (PROTONIX) 40 MG tablet TK 1 T PO QD 1 HOUR B MEALS  5    tiotropium (SPIRIVA) 18 mcg inhalation capsule Inhale 1 capsule (18 mcg total) into the lungs once daily. Controller 30 capsule 11    tiZANidine (ZANAFLEX) 4 MG tablet Take 4 mg by mouth every 8 (eight) hours.       topiramate (TOPAMAX) 100 MG tablet Take 3 tablets (300 mg total) by  mouth 2 (two) times daily. 180 tablet 5    venlafaxine (EFFEXOR-XR) 75 MG 24 hr capsule TAKE 1 CAPSULE(75 MG) BY MOUTH EVERY EVENING 90 capsule 1    [DISCONTINUED] hydrOXYzine (ATARAX) 50 MG tablet Take 1-4 at night for itching. 120 tablet 3    [DISCONTINUED] metformin (GLUCOPHAGE-XR) 500 MG 24 hr tablet Take 2 tablets (1,000 mg total) by mouth 2 (two) times daily with meals. 360 tablet 0    [DISCONTINUED] QUEtiapine (SEROQUEL) 25 MG Tab TAKE 1 TABLET(25 MG) BY MOUTH EVERY EVENING 90 tablet 0    meloxicam (MOBIC) 15 MG tablet TAKE 1 TABLET(15 MG) BY MOUTH DAILY AS NEEDED FOR HEADACHE 90 tablet 1    montelukast (SINGULAIR) 10 mg tablet TAKE 1 TABLET(10 MG) BY MOUTH EVERY EVENING 30 tablet 0    plecanatide (TRULANCE) 3 mg Tab Take 3 mg by mouth once daily. 30 tablet 6    [DISCONTINUED] montelukast (SINGULAIR) 10 mg tablet TAKE 1 TABLET(10 MG) BY MOUTH EVERY EVENING 90 tablet 0    [DISCONTINUED] oxyCODONE-acetaminophen (PERCOCET) 5-325 mg per tablet Take 1 tablet by mouth every 4 (four) hours as needed for Pain. 10 tablet 0    [DISCONTINUED] sumatriptan (IMITREX) 50 MG tablet Take 1 tab at onset of headaches.  If no improvement in 2 hours, take another.  Do not take more than 2 tabs in 24 hours. 9 tablet 5     No current facility-administered medications on file prior to visit.        Allergies   Review of patient's allergies indicates:  No Known Allergies    Review of Systems (Pertinent positives)  Review of Systems   Constitutional: Positive for malaise/fatigue.   HENT: Positive for ear pain, sinus pain and sore throat.    Eyes: Negative.    Respiratory: Positive for sputum production and shortness of breath. Negative for hemoptysis.    Cardiovascular: Negative for chest pain, leg swelling and PND.   Gastrointestinal: Negative for abdominal pain and vomiting.   Musculoskeletal: Positive for neck pain.   Skin: Negative for rash.   Neurological: Positive for headaches.         BP (!) 140/90 (BP Location: Left  "arm, Patient Position: Sitting, BP Method: Medium (Manual))   Pulse 83   Temp 99 °F (37.2 °C) (Oral)   Resp 16   Ht 5' 4" (1.626 m)   Wt (!) 167.6 kg (369 lb 7.9 oz)   SpO2 95%   BMI 63.42 kg/m²     GENERAL APPEARANCE: in no apparent distress and well developed and well nourished  HEENT: PERRLA, EOMI, Sclera clear, anicteric, Oropharynx clear, no lesions, Neck supple with midline trachea; pain on palpation of left frontal sinus  NECK: normal, supple, no adenopathy, thyroid normal in size  RESPIRATORY: appears well, vitals normal, no respiratory distress, acyanotic, normal RR, chest clear, no wheezing, crepitations, rhonchi, normal symmetric air entry  HEART: regular rate and rhythm, S1, S2 normal, no murmur, click, rub or gallop.    ABDOMEN: abdomen is soft without tenderness, no masses, no hernias, no organomegaly, no rebound, no guarding. Suprapubic tenderness absent. No CVA tenderness.  SKIN: no rashes, no wounds, no other lesions  PSYCH: Alert, oriented x 3, thought content appropriate, speech normal, pleasant and cooperative, good eye contact, well groomed    Assessment/Plan:  Yanni Kaiser is a 48 y.o. female who presents today for :    Yanni was seen today for cough, fatigue, arm pain, edema and headache.    Diagnoses and all orders for this visit:    Chronic frontal sinusitis  -     doxycycline (VIBRAMYCIN) 100 MG Cap; Take 1 capsule (100 mg total) by mouth every 12 (twelve) hours. for 10 days    Chronic nonintractable headache, unspecified headache type  -     sumatriptan (IMITREX) 50 MG tablet; Take 1 tab at onset of headaches.  If no improvement in 2 hours, take another.  Do not take more than 2 tabs in 24 hours.    Severe episode of recurrent major depressive disorder, with psychotic features  -     QUEtiapine (SEROQUEL) 25 MG Tab; TAKE 1 TABLET(25 MG) BY MOUTH EVERY EVENING    Other orders  -     metFORMIN (GLUCOPHAGE-XR) 500 MG 24 hr tablet; Take 2 tablets (1,000 mg total) by mouth 2 " (two) times daily with meals.  -     hydrOXYzine (ATARAX) 50 MG tablet; Take 1-4 at night for itching.      1.  Doxycycline for sinusitis  2.  imitrex refilled  3.  seroquel refilled  4.  Call Friday if feeling better and will order labs for fatigue      Carlyle Gordillo MD

## 2019-01-13 ENCOUNTER — ANESTHESIA EVENT (OUTPATIENT)
Dept: ENDOSCOPY | Facility: HOSPITAL | Age: 49
End: 2019-01-13
Payer: MEDICARE

## 2019-01-14 ENCOUNTER — ANESTHESIA (OUTPATIENT)
Dept: ENDOSCOPY | Facility: HOSPITAL | Age: 49
End: 2019-01-14
Payer: MEDICARE

## 2019-01-14 ENCOUNTER — HOSPITAL ENCOUNTER (OUTPATIENT)
Facility: HOSPITAL | Age: 49
Discharge: HOME | End: 2019-01-14
Attending: INTERNAL MEDICINE | Admitting: INTERNAL MEDICINE
Payer: MEDICARE

## 2019-01-14 VITALS
BODY MASS INDEX: 50.02 KG/M2 | OXYGEN SATURATION: 96 % | RESPIRATION RATE: 18 BRPM | DIASTOLIC BLOOD PRESSURE: 84 MMHG | HEIGHT: 64 IN | HEART RATE: 78 BPM | TEMPERATURE: 98 F | WEIGHT: 293 LBS | SYSTOLIC BLOOD PRESSURE: 142 MMHG

## 2019-01-14 DIAGNOSIS — D50.9 ANEMIA, IRON DEFICIENCY: ICD-10-CM

## 2019-01-14 DIAGNOSIS — D50.9 IRON DEFICIENCY ANEMIA, UNSPECIFIED IRON DEFICIENCY ANEMIA TYPE: Primary | ICD-10-CM

## 2019-01-14 LAB
B-HCG UR QL: NEGATIVE
CTP QC/QA: YES
POCT GLUCOSE: 120 MG/DL (ref 70–110)
POCT GLUCOSE: 93 MG/DL (ref 70–110)

## 2019-01-14 PROCEDURE — D9220A PRA ANESTHESIA: Mod: CRNA,,, | Performed by: NURSE ANESTHETIST, CERTIFIED REGISTERED

## 2019-01-14 PROCEDURE — 45380 COLONOSCOPY AND BIOPSY: CPT | Mod: GC,,, | Performed by: INTERNAL MEDICINE

## 2019-01-14 PROCEDURE — 82962 GLUCOSE BLOOD TEST: CPT | Performed by: INTERNAL MEDICINE

## 2019-01-14 PROCEDURE — 43239 EGD BIOPSY SINGLE/MULTIPLE: CPT | Performed by: INTERNAL MEDICINE

## 2019-01-14 PROCEDURE — 25000242 PHARM REV CODE 250 ALT 637 W/ HCPCS: Performed by: NURSE ANESTHETIST, CERTIFIED REGISTERED

## 2019-01-14 PROCEDURE — 25000003 PHARM REV CODE 250: Performed by: NURSE ANESTHETIST, CERTIFIED REGISTERED

## 2019-01-14 PROCEDURE — 00813 ANES UPR LWR GI NDSC PX: CPT | Performed by: INTERNAL MEDICINE

## 2019-01-14 PROCEDURE — 45380 PR COLONOSCOPY,BIOPSY: ICD-10-PCS | Mod: GC,,, | Performed by: INTERNAL MEDICINE

## 2019-01-14 PROCEDURE — 88305 TISSUE SPECIMEN TO PATHOLOGY - SURGERY: ICD-10-PCS | Mod: 26,,, | Performed by: PATHOLOGY

## 2019-01-14 PROCEDURE — 63600175 PHARM REV CODE 636 W HCPCS: Performed by: NURSE ANESTHETIST, CERTIFIED REGISTERED

## 2019-01-14 PROCEDURE — 88305 TISSUE EXAM BY PATHOLOGIST: CPT | Mod: 59 | Performed by: PATHOLOGY

## 2019-01-14 PROCEDURE — D9220A PRA ANESTHESIA: Mod: ANES,,, | Performed by: ANESTHESIOLOGY

## 2019-01-14 PROCEDURE — D9220A PRA ANESTHESIA: ICD-10-PCS | Mod: CRNA,,, | Performed by: NURSE ANESTHETIST, CERTIFIED REGISTERED

## 2019-01-14 PROCEDURE — 43239 EGD BIOPSY SINGLE/MULTIPLE: CPT | Mod: 51,,, | Performed by: INTERNAL MEDICINE

## 2019-01-14 PROCEDURE — 27201012 HC FORCEPS, HOT/COLD, DISP: Performed by: INTERNAL MEDICINE

## 2019-01-14 PROCEDURE — 43239 PR EGD, FLEX, W/BIOPSY, SGL/MULTI: ICD-10-PCS | Mod: 51,,, | Performed by: INTERNAL MEDICINE

## 2019-01-14 PROCEDURE — 81025 URINE PREGNANCY TEST: CPT | Performed by: INTERNAL MEDICINE

## 2019-01-14 PROCEDURE — 37000009 HC ANESTHESIA EA ADD 15 MINS: Performed by: INTERNAL MEDICINE

## 2019-01-14 PROCEDURE — 25000003 PHARM REV CODE 250: Performed by: INTERNAL MEDICINE

## 2019-01-14 PROCEDURE — 45380 COLONOSCOPY AND BIOPSY: CPT | Performed by: INTERNAL MEDICINE

## 2019-01-14 PROCEDURE — 37000008 HC ANESTHESIA 1ST 15 MINUTES: Performed by: INTERNAL MEDICINE

## 2019-01-14 PROCEDURE — D9220A PRA ANESTHESIA: ICD-10-PCS | Mod: ANES,,, | Performed by: ANESTHESIOLOGY

## 2019-01-14 PROCEDURE — 88305 TISSUE EXAM BY PATHOLOGIST: CPT | Mod: 26,,, | Performed by: PATHOLOGY

## 2019-01-14 RX ORDER — SODIUM CHLORIDE 0.9 % (FLUSH) 0.9 %
3 SYRINGE (ML) INJECTION
Status: DISCONTINUED | OUTPATIENT
Start: 2019-01-14 | End: 2019-01-14 | Stop reason: HOSPADM

## 2019-01-14 RX ORDER — ROCURONIUM BROMIDE 10 MG/ML
INJECTION, SOLUTION INTRAVENOUS
Status: DISCONTINUED | OUTPATIENT
Start: 2019-01-14 | End: 2019-01-14

## 2019-01-14 RX ORDER — LIDOCAINE HCL/PF 100 MG/5ML
SYRINGE (ML) INTRAVENOUS
Status: DISCONTINUED | OUTPATIENT
Start: 2019-01-14 | End: 2019-01-14

## 2019-01-14 RX ORDER — FENTANYL CITRATE 50 UG/ML
INJECTION, SOLUTION INTRAMUSCULAR; INTRAVENOUS
Status: DISCONTINUED | OUTPATIENT
Start: 2019-01-14 | End: 2019-01-14

## 2019-01-14 RX ORDER — SUCCINYLCHOLINE CHLORIDE 20 MG/ML
INJECTION INTRAMUSCULAR; INTRAVENOUS
Status: DISCONTINUED | OUTPATIENT
Start: 2019-01-14 | End: 2019-01-14

## 2019-01-14 RX ORDER — EPHEDRINE SULFATE 50 MG/ML
INJECTION, SOLUTION INTRAVENOUS
Status: DISCONTINUED | OUTPATIENT
Start: 2019-01-14 | End: 2019-01-14

## 2019-01-14 RX ORDER — DEXAMETHASONE SODIUM PHOSPHATE 4 MG/ML
INJECTION, SOLUTION INTRA-ARTICULAR; INTRALESIONAL; INTRAMUSCULAR; INTRAVENOUS; SOFT TISSUE
Status: DISCONTINUED | OUTPATIENT
Start: 2019-01-14 | End: 2019-01-14

## 2019-01-14 RX ORDER — PHENYLEPHRINE HYDROCHLORIDE 10 MG/ML
INJECTION INTRAVENOUS
Status: DISCONTINUED | OUTPATIENT
Start: 2019-01-14 | End: 2019-01-14

## 2019-01-14 RX ORDER — GLYCOPYRROLATE 0.2 MG/ML
INJECTION INTRAMUSCULAR; INTRAVENOUS
Status: DISCONTINUED | OUTPATIENT
Start: 2019-01-14 | End: 2019-01-14

## 2019-01-14 RX ORDER — ALBUTEROL SULFATE 90 UG/1
AEROSOL, METERED RESPIRATORY (INHALATION)
Status: DISCONTINUED | OUTPATIENT
Start: 2019-01-14 | End: 2019-01-14

## 2019-01-14 RX ORDER — SODIUM CHLORIDE 9 MG/ML
INJECTION, SOLUTION INTRAVENOUS CONTINUOUS
Status: DISCONTINUED | OUTPATIENT
Start: 2019-01-14 | End: 2019-01-14 | Stop reason: HOSPADM

## 2019-01-14 RX ORDER — ONDANSETRON 2 MG/ML
INJECTION INTRAMUSCULAR; INTRAVENOUS
Status: DISCONTINUED | OUTPATIENT
Start: 2019-01-14 | End: 2019-01-14

## 2019-01-14 RX ORDER — PROPOFOL 10 MG/ML
VIAL (ML) INTRAVENOUS
Status: DISCONTINUED | OUTPATIENT
Start: 2019-01-14 | End: 2019-01-14

## 2019-01-14 RX ADMIN — ALBUTEROL SULFATE 2 PUFF: 90 AEROSOL, METERED RESPIRATORY (INHALATION) at 11:01

## 2019-01-14 RX ADMIN — GLYCOPYRROLATE 0.2 MG: 0.2 INJECTION, SOLUTION INTRAMUSCULAR; INTRAVENOUS at 11:01

## 2019-01-14 RX ADMIN — EPHEDRINE SULFATE 10 MG: 50 INJECTION, SOLUTION INTRAMUSCULAR; INTRAVENOUS; SUBCUTANEOUS at 11:01

## 2019-01-14 RX ADMIN — SODIUM CHLORIDE: 0.9 INJECTION, SOLUTION INTRAVENOUS at 09:01

## 2019-01-14 RX ADMIN — PHENYLEPHRINE HYDROCHLORIDE 100 MCG: 10 INJECTION INTRAVENOUS at 11:01

## 2019-01-14 RX ADMIN — ONDANSETRON 4 MG: 2 INJECTION INTRAMUSCULAR; INTRAVENOUS at 11:01

## 2019-01-14 RX ADMIN — VASOPRESSIN 1 UNITS: 20 INJECTION INTRAVENOUS at 11:01

## 2019-01-14 RX ADMIN — SUCCINYLCHOLINE CHLORIDE 180 MG: 20 INJECTION, SOLUTION INTRAMUSCULAR; INTRAVENOUS at 10:01

## 2019-01-14 RX ADMIN — SODIUM CHLORIDE: 0.9 INJECTION, SOLUTION INTRAVENOUS at 11:01

## 2019-01-14 RX ADMIN — LIDOCAINE HYDROCHLORIDE 100 MG: 20 INJECTION, SOLUTION INTRAVENOUS at 10:01

## 2019-01-14 RX ADMIN — ROCURONIUM BROMIDE 5 MG: 10 INJECTION, SOLUTION INTRAVENOUS at 10:01

## 2019-01-14 RX ADMIN — ALBUTEROL SULFATE 2 PUFF: 90 AEROSOL, METERED RESPIRATORY (INHALATION) at 10:01

## 2019-01-14 RX ADMIN — FENTANYL CITRATE 100 MCG: 50 INJECTION, SOLUTION INTRAMUSCULAR; INTRAVENOUS at 10:01

## 2019-01-14 RX ADMIN — PROPOFOL 200 MG: 10 INJECTION, EMULSION INTRAVENOUS at 10:01

## 2019-01-14 RX ADMIN — EPHEDRINE SULFATE 10 MG: 50 INJECTION, SOLUTION INTRAMUSCULAR; INTRAVENOUS; SUBCUTANEOUS at 10:01

## 2019-01-14 RX ADMIN — DEXAMETHASONE SODIUM PHOSPHATE 8 MG: 4 INJECTION, SOLUTION INTRAMUSCULAR; INTRAVENOUS at 10:01

## 2019-01-14 NOTE — DISCHARGE INSTRUCTIONS

## 2019-01-14 NOTE — PROGRESS NOTES
Plan of care reviewed with pt & spouse, both verbalized understanding, pt progressing with plan of care, denies nausea & pain, tolerating PO, reviewed all DC instructions, home meds,when to call MD, when to follow-up, answered questions.

## 2019-01-14 NOTE — [employer] PATIENT INSTRUCTIONS
Discharge Summary/Instructions after an Endoscopic Procedure  Patient Name: Yanni Kaiser  Patient MRN: [id number]  Patient YOB: 1970 Monday, January 14, 2019  Jagruti Sweeney MD  RESTRICTIONS:  During your procedure today, you received medications for sedation.  These   medications may affect your judgment, balance and coordination.  Therefore,   for 24 hours, you have the following restrictions:   - DO NOT drive a car, operate machinery, make legal/financial decisions,   sign important papers or drink alcohol.    ACTIVITY:  Today: no heavy lifting, straining or running due to procedural   sedation/anesthesia.  The following day: return to full activity including work.  DIET:  Eat and drink normally unless instructed otherwise.     TREATMENT FOR COMMON SIDE EFFECTS:  - Mild abdominal pain, nausea, belching, bloating or excessive gas:  rest,   eat lightly and use a heating pad.  - Sore Throat: treat with throat lozenges and/or gargle with warm salt   water.  - Because air was used during the procedure, expelling large amounts of air   from your rectum or belching is normal.  - If a bowel prep was taken, you may not have a bowel movement for 1-3 days.    This is normal.  SYMPTOMS TO WATCH FOR AND REPORT TO YOUR PHYSICIAN:  1. Abdominal pain or bloating, other than gas cramps.  2. Chest pain.  3. Back pain.  4. Signs of infection such as: chills or fever occurring within 24 hours   after the procedure.  5. Rectal bleeding, which would show as bright red, maroon, or black stools.   (A tablespoon of blood from the rectum is not serious, especially if   hemorrhoids are present.)  6. Vomiting.  7. Weakness or dizziness.  GO DIRECTLY TO THE NEAREST EMERGENCY ROOM IF YOU HAVE ANY OF THE FOLLOWING:      Difficulty breathing              Chills and/or fever over 101 F   Persistent vomiting and/or vomiting blood   Severe abdominal pain   Severe chest pain   Black, tarry stools   Bleeding- more than one  tablespoon   Any other symptom or condition that you feel may need urgent attention  Your doctor recommends these additional instructions:  If any biopsies were taken, your doctors clinic will contact you in 1 to 2   weeks with any results.  - Discharge patient to home (ambulatory).   - Resume previous diet today.   - Repeat colonoscopy in 5 years for surveillance, Flex sig with polypectomy   of any of the sigmoid or rectosigmoid polyps come back as adenomas.  - Await pathology results.   - The findings and recommendations were discussed with the patient's family.     - Patient has a contact number available for emergencies.  The signs and   symptoms of potential delayed complications were discussed with the   patient.  Return to normal activities tomorrow.  Written discharge   instructions were provided to the patient.   - Continue present medications.  For questions, problems or results please call your physician - Jagruti Sweeney MD at Work:  [phone].  [facility], EMERGENCY ROOM PHONE NUMBER: [phone]  IF A COMPLICATION OR EMERGENCY SITUATION ARISES AND YOU ARE UNABLE TO REACH   YOUR PHYSICIAN - GO DIRECTLY TO THE EMERGENCY ROOM.  Jagruti Sweeney MD  1/14/2019 12:05:20 PM  This report has been verified and signed electronically.  [employer]

## 2019-01-14 NOTE — ANESTHESIA POSTPROCEDURE EVALUATION
"Anesthesia Post Evaluation    Patient: Yanni Kaiser    Procedure(s) Performed: Procedure(s) (LRB):  EGD (ESOPHAGOGASTRODUODENOSCOPY) (N/A)  COLONOSCOPY (N/A)    Final Anesthesia Type: general  Patient location during evaluation: PACU  Patient participation: Yes- Able to Participate  Level of consciousness: awake and alert  Post-procedure vital signs: reviewed and stable  Pain management: adequate  Airway patency: patent  PONV status at discharge: No PONV  Anesthetic complications: no      Cardiovascular status: blood pressure returned to baseline and hemodynamically stable  Respiratory status: unassisted, spontaneous ventilation and room air  Hydration status: euvolemic  Follow-up not needed.        Visit Vitals  BP (!) 142/84   Pulse 78   Temp 36.6 °C (97.9 °F)   Resp 18   Ht 5' 4" (1.626 m)   Wt (!) 169.2 kg (373 lb)   SpO2 96%   Breastfeeding? No   BMI 64.03 kg/m²       Pain/Nida Score: Nida Score: 10 (1/14/2019  1:29 PM)        "

## 2019-01-14 NOTE — [employer]ATION PATIENT INSTRUCTIONS
Discharge Summary/Instructions after an Endoscopic Procedure  Patient Name: Yanni Kaiser  Patient MRN: [id number]  Patient YOB: 1970 Monday, January 14, 2019  Jagruti Sweeney MD  RESTRICTIONS:  During your procedure today, you received medications for sedation.  These   medications may affect your judgment, balance and coordination.  Therefore,   for 24 hours, you have the following restrictions:   - DO NOT drive a car, operate machinery, make legal/financial decisions,   sign important papers or drink alcohol.    ACTIVITY:  Today: no heavy lifting, straining or running due to procedural   sedation/anesthesia.  The following day: return to full activity including work.  DIET:  Eat and drink normally unless instructed otherwise.     TREATMENT FOR COMMON SIDE EFFECTS:  - Mild abdominal pain, nausea, belching, bloating or excessive gas:  rest,   eat lightly and use a heating pad.  - Sore Throat: treat with throat lozenges and/or gargle with warm salt   water.  - Because air was used during the procedure, expelling large amounts of air   from your rectum or belching is normal.  - If a bowel prep was taken, you may not have a bowel movement for 1-3 days.    This is normal.  SYMPTOMS TO WATCH FOR AND REPORT TO YOUR PHYSICIAN:  1. Abdominal pain or bloating, other than gas cramps.  2. Chest pain.  3. Back pain.  4. Signs of infection such as: chills or fever occurring within 24 hours   after the procedure.  5. Rectal bleeding, which would show as bright red, maroon, or black stools.   (A tablespoon of blood from the rectum is not serious, especially if   hemorrhoids are present.)  6. Vomiting.  7. Weakness or dizziness.  GO DIRECTLY TO THE NEAREST EMERGENCY ROOM IF YOU HAVE ANY OF THE FOLLOWING:      Difficulty breathing              Chills and/or fever over 101 F   Persistent vomiting and/or vomiting blood   Severe abdominal pain   Severe chest pain   Black, tarry stools   Bleeding- more than one  tablespoon   Any other symptom or condition that you feel may need urgent attention  Your doctor recommends these additional instructions:  If any biopsies were taken, your doctors clinic will contact you in 1 to 2   weeks with any results.  - Await pathology results.   - Discharge patient to home (with escort).   - Resume previous diet.   - Continue present medications.   - Perform a colonoscopy today.   - The findings and recommendations were discussed with the patient.   - Patient has a contact number available for emergencies.  The signs and   symptoms of potential delayed complications were discussed with the   patient.  Return to normal activities tomorrow.  Written discharge   instructions were provided to the patient.  For questions, problems or results please call your physician - Jagruti Sweeney MD at Work:  [phone].  [facility], EMERGENCY ROOM PHONE NUMBER: [phone]  IF A COMPLICATION OR EMERGENCY SITUATION ARISES AND YOU ARE UNABLE TO REACH   YOUR PHYSICIAN - GO DIRECTLY TO THE EMERGENCY ROOM.  Jagruti Sweeney MD  1/14/2019 11:05:00 AM  This report has been verified and signed electronically.  [employer]

## 2019-01-14 NOTE — ANESTHESIA PREPROCEDURE EVALUATION
01/14/2019  Pre-operative evaluation for Procedure(s) (LRB):  EGD (ESOPHAGOGASTRODUODENOSCOPY) (N/A)  COLONOSCOPY (N/A)    Yanni Kaisre is a 48 y.o. female morbid obesity, SHARATH, asthma, CKD, DMII, tobacco abuse, seizure d/o.     Patient Active Problem List   Diagnosis    Mild intermittent asthma    SHARATH (obstructive sleep apnea)    Leg varices    Low back pain    Migraine    Morbid obesity with BMI of 60.0-69.9, adult    Tobacco use disorder, moderate, dependence    Exacerbation of asthma    Anemia of chronic disease    Mild protein malnutrition    Weakness    Allergic rhinitis    Idiopathic intracranial hypertension    Essential hypertension    Combined hyperlipidemia associated with type 2 diabetes mellitus    Depression    Hyperlipidemia    GERD (gastroesophageal reflux disease)    Epilepsy    Plantar fasciitis, bilateral    Numbness of right hand    Muscle tightness    Muscle weakness    Type 2 diabetes mellitus with stage 3 chronic kidney disease, with long-term current use of insulin    Recurrent major depressive disorder, in full remission    Simple chronic bronchitis    Other chronic pain    Suicide attempt    Asthma with severe exacerbation    Opioid dependence    Lumbar stenosis    Chest pain    Foot pain, bilateral    Antalgic gait    Dysfunctional uterine bleeding    DUB (dysfunctional uterine bleeding)    S/P D&C (status post dilation and curettage)    Simple endometrial hyperplasia       Review of patient's allergies indicates:  No Known Allergies    No current facility-administered medications on file prior to encounter.      Current Outpatient Medications on File Prior to Encounter   Medication Sig Dispense Refill    acetaZOLAMIDE (DIAMOX) 500 mg CpSR Take 1 capsule (500 mg total) by mouth 2 (two) times daily. 360 capsule 1    albuterol 90  mcg/actuation inhaler Inhale 2 puffs into the lungs every 6 (six) hours as needed for Wheezing. Rescue 1 Inhaler 0    albuterol-ipratropium (DUO-NEB) 2.5 mg-0.5 mg/3 mL nebulizer solution Take 3 mLs by nebulization every 6 (six) hours as needed for Wheezing or Shortness of Breath. Rescue 100 mL 3    blood sugar diagnostic Strp 1 each by Misc.(Non-Drug; Combo Route) route 4 (four) times daily before meals and nightly. ACCU CHEK ELVIA PLUS METER 200 each 3    blood-glucose meter (ACCU-CHEK ELVIA PLUS METER) Mis TEST FOUR TIMES DAILY BEFORE MEALS AND EVERY NIGHT 90 each 1    budesonide-formoterol 160-4.5 mcg (SYMBICORT) 160-4.5 mcg/actuation HFAA Inhale 2 puffs into the lungs every 12 (twelve) hours. Controller 1 Inhaler 5    capsaicin 0.1 % Crea Apply 1 application topically 2 (two) times daily. 56.6 g 1    fluticasone (FLONASE) 50 mcg/actuation nasal spray 1 spray (50 mcg total) by Each Nare route once daily. 16 g 5    hydroCHLOROthiazide (HYDRODIURIL) 25 MG tablet Take 1 tablet (25 mg total) by mouth once daily. 30 tablet 11    lancets (ACCU-CHEK SOFTCLIX LANCETS) Misc 1 Device by Misc.(Non-Drug; Combo Route) route 4 (four) times daily. 200 each 3    medroxyPROGESTERone (PROVERA) 10 MG tablet Take 1 tablet (10 mg total) by mouth once daily. TAKE DAY 1-12 Q MONTH 12 tablet 12    meloxicam (MOBIC) 15 MG tablet TAKE 1 TABLET(15 MG) BY MOUTH DAILY AS NEEDED FOR HEADACHE 90 tablet 1    mometasone 0.1% (ELOCON) 0.1 % cream       nicotine polacrilex 2 MG Lozg Take 1 lozenge (2 mg total) by mouth as needed. 168 lozenge 0    ondansetron (ZOFRAN) 8 MG tablet Take 1 tablet (8 mg total) by mouth every 8 (eight) hours as needed for Nausea. 90 tablet 5    pantoprazole (PROTONIX) 40 MG tablet TK 1 T PO QD 1 HOUR B MEALS  5    plecanatide (TRULANCE) 3 mg Tab Take 3 mg by mouth once daily. 30 tablet 6    tiotropium (SPIRIVA) 18 mcg inhalation capsule Inhale 1 capsule (18 mcg total) into the lungs once daily.  Controller 30 capsule 11    tiZANidine (ZANAFLEX) 4 MG tablet Take 4 mg by mouth every 8 (eight) hours.       topiramate (TOPAMAX) 100 MG tablet Take 3 tablets (300 mg total) by mouth 2 (two) times daily. 180 tablet 5       Past Surgical History:   Procedure Laterality Date    BREAST CYST ASPIRATION       SECTION      X 1    CHOLECYSTECTOMY      COLONOSCOPY      HYSTEROSCOPY, WITH DILATION AND CURETTAGE OF UTERUS N/A 10/16/2018    Performed by [facility] OR    SINUS SURGERY      UPPER GASTROINTESTINAL ENDOSCOPY         Social History     Socioeconomic History    Marital status:      Spouse name: Not on file    Number of children: Not on file    Years of education: Not on file    Highest education level: Not on file   Social Needs    Financial resource strain: Not on file    Food insecurity - worry: Not on file    Food insecurity - inability: Not on file    Transportation needs - medical: Not on file    Transportation needs - non-medical: Not on file   Occupational History    Not on file   Tobacco Use    Smoking status: Current Every Day Smoker     Packs/day: 1.00     Years: 15.00     Pack years: 15.00     Types: Cigarettes    Smokeless tobacco: Never Used   Substance and Sexual Activity    Alcohol use: No     Alcohol/week: 0.0 oz    Drug use: No    Sexual activity: Yes     Partners: Male     Birth control/protection: None   Other Topics Concern    Not on file   Social History Narrative    Not on file         CBC: No results for input(s): WBC, RBC, HGB, HCT, PLT, MCV, MCH, MCHC in the last 72 hours.    CMP: No results for input(s): NA, K, CL, CO2, BUN, CREATININE, GLU, MG, PHOS, CALCIUM, ALBUMIN, PROT, ALKPHOS, ALT, AST, BILITOT in the last 72 hours.    INR  No results for input(s): PT, INR, PROTIME, APTT in the last 72 hours.        Diagnostic Studies:      EKD Echo:  Results for orders placed or performed during the hospital encounter of  06/09/15   2D echo with color flow doppler   Result Value Ref Range    QEF 60 55 - 65    Diastolic Dysfunction No     Est. PA Systolic Pressure 12.73     Tricuspid Valve Regurgitation TRIVIAL          Anesthesia Evaluation    I have reviewed the Patient Summary Reports.     I have reviewed the Medications.     Review of Systems  Anesthesia Hx:  History of prior surgery of interest to airway management or planning: Denies Family Hx of Anesthesia complications.   Denies Personal Hx of Anesthesia complications.       Physical Exam  General:  Morbid Obesity    Airway/Jaw/Neck:  Airway Findings: Mouth Opening: Normal Tongue: Large  General Airway Assessment: Adult  Mallampati: II  Improves to II with phonation.  TM Distance: Normal, at least 6 cm  Jaw/Neck Findings:  Neck ROM: Normal ROM      Dental:  Dental Findings: In tact   Chest/Lungs:  Chest/Lungs Findings: Clear to auscultation, Normal Respiratory Rate         Mental Status:  Mental Status Findings:  Cooperative, Alert and Oriented         Anesthesia Plan  Type of Anesthesia, risks & benefits discussed:  Anesthesia Type:  general  Patient's Preference:   Intra-op Monitoring Plan: standard ASA monitors  Intra-op Monitoring Plan Comments:   Post Op Pain Control Plan: multimodal analgesia  Post Op Pain Control Plan Comments:   Induction:   IV  Beta Blocker:  Patient is not currently on a Beta-Blocker (No further documentation required).       Informed Consent: Patient understands risks and agrees with Anesthesia plan.  Questions answered. Anesthesia consent signed with patient.  ASA Score: 3     Day of Surgery Review of History & Physical:    H&P update referred to the provider.     Anesthesia Plan Notes: Plan is for GETA 2/2 reactive airway non compliant with inhaled medications, active tobacco abuse and hx/o significant gastroparesis. Inhaled albuterol prior to induction.         Ready For Surgery From Anesthesia Perspective.

## 2019-01-14 NOTE — TRANSFER OF CARE
"Anesthesia Transfer of Care Note    Patient: Yanni Kaiser    Procedure(s) Performed: Procedure(s) (LRB):  EGD (ESOPHAGOGASTRODUODENOSCOPY) (N/A)  COLONOSCOPY (N/A)    Patient location: [facility]    Anesthesia Type: general    Transport from OR: Transported from OR on 6-10 L/min O2 by face mask with adequate spontaneous ventilation    Post pain: adequate analgesia    Post assessment: no apparent anesthetic complications and tolerated procedure well    Post vital signs: stable    Level of consciousness: awake, alert and oriented    Nausea/Vomiting: no nausea/vomiting    Complications: none    Transfer of care protocol was followed      Last vitals:   Visit Vitals  /76   Pulse 65   Temp 36.6 °C (97.9 °F)   Resp 17   Ht 5' 4" (1.626 m)   Wt (!) 169.2 kg (373 lb)   SpO2 99%   Breastfeeding? No   BMI 64.03 kg/m²     "

## 2019-01-14 NOTE — PROGRESS NOTES
Report given to JOSH Cortez. Pt is ready to go, waiting for MD Sweeney to see pt at bedside & discharge order.

## 2019-01-17 ENCOUNTER — PATIENT MESSAGE (OUTPATIENT)
Dept: ADMINISTRATIVE | Facility: OTHER | Age: 49
End: 2019-01-17

## 2019-01-24 ENCOUNTER — TELEPHONE (OUTPATIENT)
Dept: FAMILY MEDICINE | Facility: CLINIC | Age: 49
End: 2019-01-24

## 2019-01-24 NOTE — TELEPHONE ENCOUNTER
----- Message from Pat Romero sent at 1/24/2019  8:58 AM CST -----  Contact: pt  Name of Who is Calling: pt      What is the request in detail: pt needs blood work orders. Please call pt      Can the clinic reply by [facility]: no      What Number to Call Back if not in [facility]: [phone]

## 2019-01-25 DIAGNOSIS — E11.9 TYPE 2 DIABETES MELLITUS WITHOUT COMPLICATION: ICD-10-CM

## 2019-01-30 ENCOUNTER — LAB VISIT (OUTPATIENT)
Dept: LAB | Facility: HOSPITAL | Age: 49
End: 2019-01-30
Attending: FAMILY MEDICINE
Payer: MEDICARE

## 2019-01-30 DIAGNOSIS — E11.9 TYPE 2 DIABETES MELLITUS WITHOUT COMPLICATION: ICD-10-CM

## 2019-01-30 LAB
CHOLEST SERPL-MCNC: 221 MG/DL
CHOLEST/HDLC SERPL: 3.5 {RATIO}
HDLC SERPL-MCNC: 64 MG/DL
HDLC SERPL: 29 %
LDLC SERPL CALC-MCNC: 136 MG/DL
NONHDLC SERPL-MCNC: 157 MG/DL
TRIGL SERPL-MCNC: 105 MG/DL

## 2019-01-30 PROCEDURE — 36415 COLL VENOUS BLD VENIPUNCTURE: CPT | Mod: HCNC,PN

## 2019-01-30 PROCEDURE — 80061 LIPID PANEL: CPT | Mod: HCNC

## 2019-02-05 ENCOUNTER — OFFICE VISIT (OUTPATIENT)
Dept: GASTROENTEROLOGY | Facility: CLINIC | Age: 49
End: 2019-02-05
Payer: MEDICARE

## 2019-02-05 VITALS
BODY MASS INDEX: 50.02 KG/M2 | WEIGHT: 293 LBS | HEART RATE: 72 BPM | SYSTOLIC BLOOD PRESSURE: 170 MMHG | DIASTOLIC BLOOD PRESSURE: 93 MMHG | HEIGHT: 64 IN

## 2019-02-05 DIAGNOSIS — R10.12 LUQ ABDOMINAL PAIN: ICD-10-CM

## 2019-02-05 DIAGNOSIS — Z86.19 HISTORY OF HELICOBACTER PYLORI INFECTION: Primary | ICD-10-CM

## 2019-02-05 DIAGNOSIS — K59.09 CHRONIC CONSTIPATION: ICD-10-CM

## 2019-02-05 DIAGNOSIS — R68.81 EARLY SATIETY: ICD-10-CM

## 2019-02-05 DIAGNOSIS — R11.0 NAUSEA: ICD-10-CM

## 2019-02-05 DIAGNOSIS — R13.19 ESOPHAGEAL DYSPHAGIA: ICD-10-CM

## 2019-02-05 DIAGNOSIS — R07.9 CHEST PAIN, UNSPECIFIED TYPE: ICD-10-CM

## 2019-02-05 DIAGNOSIS — K59.00 CONSTIPATION, UNSPECIFIED CONSTIPATION TYPE: ICD-10-CM

## 2019-02-05 DIAGNOSIS — R14.0 BLOATING: ICD-10-CM

## 2019-02-05 DIAGNOSIS — K21.9 GASTROESOPHAGEAL REFLUX DISEASE WITHOUT ESOPHAGITIS: ICD-10-CM

## 2019-02-05 PROCEDURE — 3080F PR MOST RECENT DIASTOLIC BLOOD PRESSURE >= 90 MM HG: ICD-10-PCS | Mod: HCNC,CPTII,S$GLB, | Performed by: NURSE PRACTITIONER

## 2019-02-05 PROCEDURE — 99215 OFFICE O/P EST HI 40 MIN: CPT | Mod: HCNC,S$GLB,, | Performed by: NURSE PRACTITIONER

## 2019-02-05 PROCEDURE — 3008F BODY MASS INDEX DOCD: CPT | Mod: HCNC,CPTII,S$GLB, | Performed by: NURSE PRACTITIONER

## 2019-02-05 PROCEDURE — 3077F PR MOST RECENT SYSTOLIC BLOOD PRESSURE >= 140 MM HG: ICD-10-PCS | Mod: HCNC,CPTII,S$GLB, | Performed by: NURSE PRACTITIONER

## 2019-02-05 PROCEDURE — 99999 PR PBB SHADOW E&M-EST. PATIENT-LVL V: CPT | Mod: PBBFAC,HCNC,, | Performed by: NURSE PRACTITIONER

## 2019-02-05 PROCEDURE — 99999 PR PBB SHADOW E&M-EST. PATIENT-LVL V: ICD-10-PCS | Mod: PBBFAC,HCNC,, | Performed by: NURSE PRACTITIONER

## 2019-02-05 PROCEDURE — 3008F PR BODY MASS INDEX (BMI) DOCUMENTED: ICD-10-PCS | Mod: HCNC,CPTII,S$GLB, | Performed by: NURSE PRACTITIONER

## 2019-02-05 PROCEDURE — 3080F DIAST BP >= 90 MM HG: CPT | Mod: HCNC,CPTII,S$GLB, | Performed by: NURSE PRACTITIONER

## 2019-02-05 PROCEDURE — 99215 PR OFFICE/OUTPT VISIT, EST, LEVL V, 40-54 MIN: ICD-10-PCS | Mod: HCNC,S$GLB,, | Performed by: NURSE PRACTITIONER

## 2019-02-05 PROCEDURE — 3077F SYST BP >= 140 MM HG: CPT | Mod: HCNC,CPTII,S$GLB, | Performed by: NURSE PRACTITIONER

## 2019-02-05 RX ORDER — PANTOPRAZOLE SODIUM 40 MG/1
40 TABLET, DELAYED RELEASE ORAL 2 TIMES DAILY
Qty: 180 TABLET | Refills: 3 | Status: SHIPPED | OUTPATIENT
Start: 2019-02-05 | End: 2019-05-06

## 2019-02-05 NOTE — PATIENT INSTRUCTIONS
For reflux increase protonix 40 mg to twice daily.    For nausea, take zofran three times daily.     For abdominal pain, take over the counter FDgard as needed.     Start the gastroparesis diet: 6 small soft-liquid meals daily. Avoiding fibrous foods (like raw vegetables, salads), and fatty foods. This will help nausea and abdominal pain.    For constipation take linzess 290 mcg daily and over the counter miralax 4 times daily.    I have ordered a breath test to see if you have intestinal bacterial overgrowth.     I have ordered a stool study to test for h. Pylori bacteria.    H. Pylori testing  This stool specimen is to check if you have the H. Pylori bacterial infection in your digestive tract.    Stool H. Pylori antigen needs to be done off the following medications for the following amount of time to ensure accuracy:    1. Off all Antibiotics for 4 (Four) weeks before stool collection.     2. Off all Proton Pump Inhibitors medications for 2 (Two) weeks before collecting stool for H. Pylori Antigen:    Nexium (esomeprazole)  Prilosec (omeprazole)   Protonix (pantoprazole)  Prevacid (lansoprazole)  Aciphex (rabeprazole)  Dexilant (dexlansoprazole)   Zegerid     3. Off all H2 blockers medications for 2 (Two) weeks before collecting stool for H. Pylori Antigen:    Zantac (ranitidine)  Tagamet (cimetadine)  Axid (nizatidine)   Pepcid (famotidine)    4. Off Pepto-Bismol for 4 (Four) weeks prior to collecting stool for H. Pylori Antigen.    You may  take Tums, Mylanta, or Maalox for reflux signs and symptoms as needed until stools collected.     Once you have turned in your specimen you may resume taking your medications as directed.

## 2019-02-05 NOTE — LETTER
February 5, 2019      Flako Trejo MD  [address] [place] [address]  [address]  [facility]  [address] [place] [address]           [facility]  [address]  [facility] [address]  Phone: [phone]  Fax: [phone]          Patient: Yanni Kaiser   MR Number: [id number]   YOB: 1970   Date of Visit: 2/5/2019       Dear Dr. Flako Trejo:    Thank you for referring Yanni Kaiser to me for evaluation. Attached you will find relevant portions of my assessment and plan of care.    If you have questions, please do not hesitate to call me. I look forward to following Yanni Kaiser along with you.    Sincerely,    Ciara Pinto, NP    Enclosure  CC:  No Recipients    If you would like to receive this communication electronically, please contact [email or web address] or [phone] to request more information on [employer] Link access.    For providers and/or their staff who would like to refer a patient to [facility], please contact us through our one-stop-shop provider referral line, [facility], at [phone].    If you feel you have received this communication in error or would no longer like to receive these types of communications, please e-mail [email or web address]

## 2019-02-05 NOTE — PROGRESS NOTES
[facility] Consultation Note    Reason for Consult:    Chief Complaint   Patient presents with    Heartburn    Chest Pain    Dysphagia    Nausea    Abdominal Pain    Gas    Bloated    Constipation         PCP:   Carlyle Gordillo   [address] [facility]*    Referring MD:  Flako Trejo Md  [address] [place] [address]  [address]  [facility]  [place] [address]      HPI:  Yanni Kaiser is a 48 y.o. female with a PMH of anemia, arthritis, back pain, plantar fascitis, asthma,  anxiety, MDD, DM2 , HTN, migraines, seizures, SHARATH referred to motility clinic for second opinion regarding the following problems:    GERD.  Worse. Taking protonix 40 mg once daily.  Retrosternal pyrosis:yes  Regurgitation:yes  Belching:yes  Onset: over 5 years  Frequency: every other day  Improve with: some improvement with protonix 40 mg daily and tums prn. Some improvement with prilosec OTC 40 mg once daily in the past. Has not tried nexium, dexilant, aciphex, unsure if she has tried prevacid.   Upright symptoms: sometimes  Nocturnal symptoms:  usually    Chest pain. Reports bothersome chest pain. pressure Does not occur with activity. No SOB. No diaphoresis.   Onset: few weeks  Frequency: every other day       Triggers (cold fluids, caffeine, smoking, ETOH):feels like has to belch  Consumes mostly soft foods and liquids: regular diet  Negative cardiac workup: yes    Improvement with belching  Has not tried:   nifedipine   diltiazem   isosorrbide dinitrate   peppermint oil   sildenafil   trazodone   Botox    Dysphagia.  Reports difficulty swallowing.  Onset of symptoms:5 yrs  Problems with solids:mostly  Problems with liquids:sometimes  Choking while eating:no  Coughing while eating:no  Location: upper esophagus  Frequency:  Almost daily  Improves with:washing with liquids    Nausea.  Unchanged.  Frequency:daily  Onset: over 5  yrs    Early satiety. Unchanged.   Frequency:daily  Onset:over 5 years    No vomiting    Improves with:  Sometimes better with chewing spearmint gum  Some improvement with zofran once daily  Some improvement with phenergan IV  Has not tried compazine    Cannot recall if reglan helped; unable to tolerate d/t hand twitiching  Has not tried domperidone  Has not tried scopolamine patch.      Gastroparesis diagnosed:  Possible 2016 with smartpill  Likely r/t DM 2    Number of GP related hospitalization in the past year: none  Number of GP related ED visit in the past year: none    Interventions: (pyloroplasty, botox, gastric stimulator, gastroparesis diet):none    Curenlty on following medications that may affect gastric emptying:   Narcotics (morphine, oxycodone, tapentadol, less with tramadol):percocet 5-325 mg once weekly for planar fasciitis since 2/2018  Anticholinergics: no  Cyclosporine:no  Diabetic medications (GLP-1 analogs, Exenatide, Lixisenatide, Livaglutide, Albiglutide, DulaglutatidePramlintide - SymlinPen (injection)):no    DM 2. Dx 10 year ago. BS running 90-120s. Last hba1c 5.8 in 7/2018 per pcp. Previously followed by endocrinologist. Taking metformin 1,000 mg BID    Abdominal pain. Reports abdominal pain. Worse.    Character:dull ache and pressure  Location:LUQ  Frequency: daily   Duration:several hours  Onset:over 5 yrs ago  Worse with:meals  Improves with:nothing in particular  Associated with Bm: no  Nocturnal pain: yes  Has not tried IBgard, Bentyl, Levsin, Levbid.  Antidepressants:seroquel, effexor  Using narcotic pain medication: percocet  Nsaids: meloxicam 15 mg HS  Not on GP diet. Has seen GI dietitian. Has not tried FDgard. Trulance  is not covered. Did not restart linzess with miralax as advised.     Gas and bloating. Unchanged. No improvement with eliminating dairy.   Bloating: yes  Excessive gas: yes  Abdominal distension: unsure  Symptoms get worse after meals:sometimes  Symptoms get worse  as the day progresses:  no  consumes artificial sugars:none    Constipation.  Reports bothersome constipation. Still bad. Worse with cheese.   Lumpy and hard, difficult to pass stools:bristol type 1  Urgency and sensation of incomplete evacuation:yes  Straining during defecation:yes  Sensation of anorectal blockage:yes  Rectal prolapse:possibly sometimes  Fewer than three spontaneous bowel movements per week:yes  Frequency:1-2 BM/week  Symptoms started: 5 years ago  Uses manual maneuvers to facilitate defecation:no    Problems in early childhood: no  History of perineal trauma: no    Previous evaluation: no  Previous pelvic muscle retraining: no    No improvement with miralax once daily x couple months  Some  improvement with Linzess 290 mcg daily  No improvement with  Amitiza ?dose? BID  Has not tried lactulose, senna, colase, fiber  Takes daily probiotics x 1 month.  No trulance d/t insurance did not cover. Has not restarted linzess/ miralax as advised.    Weight fluctuates.     H/o h. Pylori in 2016. Unsure if treated  EGD bx negative for h pylori.    Anemia. Low hgb. EGD/colon negative.    Diverticulitis on CT in 11/2018. Treated with abx. Subsequent colonoscopy okay.    Fatty liver.     Family history of colon cancer. Mother dx in 50s, MGM dx ?    Anxiety. Depression. Some improvement with seroquel 25 mg HS, also takes effexor 75 mg HS (for HAs). Followed by PCP. Has seen psych in the past.     Insomnia. Not on meds. Diagnosed with SHARATH. Not on cpap/does not like it.     Migraines. Seizures. Some improvement with tompiramate 300 mg  BID, meloxicam 15 mg daily, imitrex 50 mg PRN, also taking effexor 75 mg HS, and aimovig once monthly inj. Per neruology.    Plantar fascitis. Taking percocet 5-325 mg once weekly since 2/2018. Was on a different narcotic for one month prior to this. Followed by pain management and podietry    Joint pain. Joint swelling. Knees. Hands. Back.  Seen by rheumatology w/ negative work  up.    Denies diarrhea, BRBPR, melena    Total visit time was 40 minutes, more than 50% of which was spent in face-to-face counseling with patient regarding symptoms, diagnostic results, prognosis, risks and benefits of treatment options, instructions for management, importance of compliance with chosen treatment options, risk factor reduction, stress reduction, coping strategies.      Previous Studies:   Colonoscopy 1/14/19: EPTC. Hemorrhoids. 3 mm cecal polyp (TA). Med lipoma 14 mm in AC. Recto sigmoid and cecal tics Two 3-5 mm SC polyps (hp). Multiple small polyps in rectum and recto sigmoid colon (hp). Rpt 5 yrs.   EGD 1/14/19: nl esophagus (-). Clear gastric fluid. Erosive gastropathy (-). Nl duodenum (-).   Ct a/p 11/6/18: Slight pericolonic fat stranding at the junction of the sigmoid and descending colon concerning for mild uncomplicated diverticulitis or epiploic appendagitis.Other incidental findings as above including cholecystectomy, mild focal fatty infiltration liver, tiny splenule, and osseous degenerative changes.  EGD 12/9/16: nl esophagus. Gastric erythema and granularity (gastritis + hpylori). Nl duodenum.   SmartPill 10/5/16: GET delayed 5 hr 50 min, sbtt nl 5 hr 8min, CTT nl 12 hr 13 min, SLBTT nl 19 hr 22 min, WGTT nl 25 hr 13 min  CT abd/pelvis 4/29/16: unremarkable  * colon 2016:?  Abd us 3/11/08: technically limited study. fatty liver. s/p nichole  Abd us 3/5/08: GB stones w/ + bullards sign.     Labs:  10/10/18: hgb low 11.6, RDW high 16.1, BMP unremarkable  8/8/18 : vit d low 12  7/11/18: TSH nl  6/19/18: hgb low 11.3, RDW high 16.8, cl high 113, albumin low 3.1  CCP ab IgG-  RF-  Taylor-  Sjogrens taylor -  Hep A ab IgG+  Hep c ab-  Hep b s ab-  Hep b c ab-  Hep b sag-  Quant gold tb -    Relevant Surgeries:  Cholecystectomy (3/7/2008)    * per pt report    ROS:  ROS   Constitutional: + fever, chills, night sweats, and weight loss  ENT: + congestion, rhinorrhea, and chronic sinus problems  CV:  +Chest pain, no palpitations  Pulm: + cough, + shortness of breath  Ophtho: + blurry vision, + eye redness  GI: see HPI  Derm: No rash  Heme: No lymphadenopathy, + bruising  MSK: + joint pain, + joint swelling, no Raynauds  : + dysuria, + frequent urination, no blood in urine  Endo: + hot or cold intolerance  Neuro: No dizziness, no syncope, no seizure  Psych: No anxiety, + depression        Medical History:   Past Medical History:   Diagnosis Date    Allergy     Anemia     Asthma     Depression     Diabetes with neurologic complications     GERD (gastroesophageal reflux disease)     High cholesterol     Hyperprolactinemia     Hypertension     Pseudotumor cerebri     Seizures     Simple endometrial hyperplasia 2018    Sleep apnea     Smoker         Surgical History:   Past Surgical History:   Procedure Laterality Date    BREAST CYST ASPIRATION       SECTION      X 1    CHOLECYSTECTOMY      COLONOSCOPY      COLONOSCOPY N/A 2019    Performed by [facility]weeney [facility] at [facility] ENDO (2ND FLR)    EGD (ESOPHAGOGASTRODUODENOSCOPY) N/A 2019    Performed by [facility] at [facility] END[facility] (2ND FLR)    HYSTEROSCOPY, WITH DILATION AND CURETTAGE OF UTERUS N/A 10/16/2018    Performed by Glenn Black Jr., MD at [facility] [place], operated by [facility] OR    SINUS SURGERY      UPPER GASTROINTESTINAL ENDOSCOPY          Family History:   Family History   Problem Relation Age of Onset    Hypertension Mother     Diabetes Mother     Colon cancer Mother 50    Colon polyps Mother     Heart disease Father     COPD Father     Heart attack Father     Hypertension Father     Ulcers Father     Cancer Maternal Grandmother     Breast cancer Maternal Grandmother     Colon cancer Maternal Grandmother     Colon polyps Maternal Grandmother     Celiac disease Neg Hx     Cirrhosis Neg Hx     Crohn's disease Neg Hx     Cystic fibrosis Neg Hx     Esophageal cancer Neg Hx     Hemochromatosis Neg Hx     Inflammatory bowel  disease Neg Hx     Irritable bowel syndrome Neg Hx     Liver cancer Neg Hx     Liver disease Neg Hx     Rectal cancer Neg Hx     Stomach cancer Neg Hx     Ulcerative colitis Neg Hx     Jonnie's disease Neg Hx     Tuberculosis Neg Hx     Lymphoma Neg Hx     Scleroderma Neg Hx     Rheum arthritis Neg Hx     Melanoma Neg Hx     Multiple sclerosis Neg Hx     Psoriasis Neg Hx     Lupus Neg Hx     Skin cancer Neg Hx         Social History:   Social History     Socioeconomic History    Marital status:      Spouse name: None    Number of children: None    Years of education: None    Highest education level: None   Social Needs    Financial resource strain: None    Food insecurity - worry: None    Food insecurity - inability: None    Transportation needs - medical: None    Transportation needs - non-medical: None   Occupational History    None   Tobacco Use    Smoking status: Current Every Day Smoker     Packs/day: 1.00     Years: 15.00     Pack years: 15.00     Types: Cigarettes    Smokeless tobacco: Never Used   Substance and Sexual Activity    Alcohol use: No     Alcohol/week: 0.0 oz    Drug use: No    Sexual activity: Yes     Partners: Male     Birth control/protection: None   Other Topics Concern    None   Social History Narrative    None        Review of patient's allergies indicates:  No Known Allergies    Current Outpatient Medications   Medication Sig Dispense Refill    acetaZOLAMIDE (DIAMOX) 500 mg CpSR Take 1 capsule (500 mg total) by mouth 2 (two) times daily. 360 capsule 1    albuterol 90 mcg/actuation inhaler Inhale 2 puffs into the lungs every 6 (six) hours as needed for Wheezing. Rescue 1 Inhaler 0    albuterol-ipratropium (DUO-NEB) 2.5 mg-0.5 mg/3 mL nebulizer solution Take 3 mLs by nebulization every 6 (six) hours as needed for Wheezing or Shortness of Breath. Rescue 100 mL 3    blood sugar diagnostic Strp 1 each by Misc.(Non-Drug; Combo Route) route 4 (four)  times daily before meals and nightly. ACCU CHEK ELVIA PLUS METER 200 each 3    blood-glucose meter (ACCU-CHEK ELVIA PLUS METER) Misc TEST FOUR TIMES DAILY BEFORE MEALS AND EVERY NIGHT 90 each 1    budesonide-formoterol 160-4.5 mcg (SYMBICORT) 160-4.5 mcg/actuation HFAA Inhale 2 puffs into the lungs every 12 (twelve) hours. Controller 1 Inhaler 5    capsaicin 0.1 % Crea Apply 1 application topically 2 (two) times daily. 56.6 g 1    fluticasone (FLONASE) 50 mcg/actuation nasal spray 1 spray (50 mcg total) by Each Nare route once daily. 16 g 5    GAVILYTE-G 236-22.74-6.74 -5.86 gram suspension   0    hydroCHLOROthiazide (HYDRODIURIL) 25 MG tablet Take 1 tablet (25 mg total) by mouth once daily. 30 tablet 11    hydrOXYzine (ATARAX) 50 MG tablet Take 1-4 at night for itching. 120 tablet 3    lancets (ACCU-CHEK SOFTCLIX LANCETS) Misc 1 Device by Misc.(Non-Drug; Combo Route) route 4 (four) times daily. 200 each 3    levocetirizine (XYZAL) 5 MG tablet Take 1 tablet (5 mg total) by mouth every evening. 30 tablet 11    losartan (COZAAR) 100 MG tablet TAKE 1 TABLET BY MOUTH EVERY DAY 90 tablet 0    medroxyPROGESTERone (PROVERA) 10 MG tablet Take 1 tablet (10 mg total) by mouth once daily. TAKE DAY 1-12 Q MONTH 12 tablet 12    meloxicam (MOBIC) 15 MG tablet TAKE 1 TABLET(15 MG) BY MOUTH DAILY AS NEEDED FOR HEADACHE 90 tablet 1    metFORMIN (GLUCOPHAGE-XR) 500 MG 24 hr tablet Take 2 tablets (1,000 mg total) by mouth 2 (two) times daily with meals. 360 tablet 0    mometasone 0.1% (ELOCON) 0.1 % cream       montelukast (SINGULAIR) 10 mg tablet TAKE 1 TABLET(10 MG) BY MOUTH EVERY EVENING 30 tablet 0    naproxen (EC NAPROSYN) 500 MG EC tablet Take 1 tablet (500 mg total) by mouth 2 (two) times daily. 30 tablet 3    nicotine polacrilex 2 MG Lozg Take 1 lozenge (2 mg total) by mouth as needed. 168 lozenge 0    ondansetron (ZOFRAN) 8 MG tablet Take 1 tablet (8 mg total) by mouth every 8 (eight) hours as needed for  "Nausea. 90 tablet 5    oxyCODONE-acetaminophen (PERCOCET)  mg per tablet TAKE 1 TABLET BY MOUTH EVERY 8 HOURS AS NEEDED. MAY CAUSE DROWSINESS  0    QUEtiapine (SEROQUEL) 25 MG Tab TAKE 1 TABLET(25 MG) BY MOUTH EVERY EVENING 90 tablet 0    sumatriptan (IMITREX) 50 MG tablet Take 1 tab at onset of headaches.  If no improvement in 2 hours, take another.  Do not take more than 2 tabs in 24 hours. 9 tablet 5    tiotropium (SPIRIVA) 18 mcg inhalation capsule Inhale 1 capsule (18 mcg total) into the lungs once daily. Controller 30 capsule 11    tiZANidine (ZANAFLEX) 4 MG tablet Take 4 mg by mouth every 8 (eight) hours.       topiramate (TOPAMAX) 100 MG tablet Take 3 tablets (300 mg total) by mouth 2 (two) times daily. 180 tablet 5    venlafaxine (EFFEXOR-XR) 75 MG 24 hr capsule TAKE 1 CAPSULE(75 MG) BY MOUTH EVERY EVENING 90 capsule 1    linaclotide (LINZESS) 290 mcg Cap Take 1 capsule (290 mcg total) by mouth once daily. 90 capsule 3    pantoprazole (PROTONIX) 40 MG tablet Take 1 tablet (40 mg total) by mouth 2 (two) times daily. 180 tablet 3     No current facility-administered medications for this visit.         Objective Findings:  Vital Signs:  BP (!) 170/93   Pulse 72   Ht 5' 4" (1.626 m)   Wt (!) 168.4 kg (371 lb 4.1 oz)   BMI 63.73 kg/m²   Body mass index is 63.73 kg/m².    Physical Exam:  General appearance: alert, cooperative, no distress  HENT: Normocephalic, atraumatic, neck symmetrical, no nasal discharge  Eyes: conjunctivae/corneas clear, PERRL, EOM's intact  Lungs: clear to auscultation bilaterally, no dullness to percussion bilaterally  Heart: regular rate and rhythm without rub; no displacement of the PMI  Abdomen: soft, non-tender; bowel sounds normoactive; no organomegaly  Extremities: extremities symmetric; no clubbing, cyanosis, or edema  Integument: Skin color, texture, turgor normal; no rashes; hair distrubution normal  Neurologic: Alert and oriented X 3, normal strength, normal " coordination and gait  Psychiatric: no pressured speech; normal affect; no evidence of impaired cognition    Labs:  Lab Results   Component Value Date    WBC 5.30 11/05/2018    HGB 11.2 (L) 11/05/2018    HCT 36.9 (L) 11/05/2018    MCV 88 11/05/2018     11/05/2018     Lab Results   Component Value Date    FERRITIN 7 (L) 11/05/2018     Lab Results   Component Value Date     (L) 11/05/2018    K 3.6 11/05/2018     11/05/2018    CO2 24 11/05/2018    GLU 93 11/05/2018    BUN 13 11/05/2018    CREATININE 0.9 11/05/2018    CALCIUM 9.0 11/05/2018    PROT 7.1 11/05/2018    ALBUMIN 3.4 (L) 11/05/2018    BILITOT 0.3 11/05/2018    ALKPHOS 64 11/05/2018    AST 11 11/05/2018    ALT 11 11/05/2018     Lab Results   Component Value Date    TSH 1.229 11/05/2018     Lab Results   Component Value Date    SEDRATE 55 (H) 08/08/2018     Lab Results   Component Value Date    CRP 11.4 (H) 08/08/2018     Lab Results   Component Value Date    HGBA1C 5.8 (H) 07/30/2018           Assessment and Plan:  Yanni Kaiser is a 48 y.o. female with a PMH of anemia, arthritis, back pain, plantar fascitis, asthma,  anxiety, MDD, DM2 , HTN, migraines, seizures, SHARATH referred to motility clinic for second opinion regarding the following problems:    GERD.  EGD negative.  Some improvement with prilosec OTC 40 mg once daily in the past.  -Increase pantoprazole 40 mg to BID.   -Has not tried nexium, dexilant, aciphex, unsure if she has tried prevacid.      Chest pain. Reports bothersome chest pain. Occurring for a few weeks. Negative cardiac work up  Improvement with belching  -PPI BID  -Will consider esophageal manometry if no improvement with above.    Dysphagia.  EGD negative.  -PPI BID  -Will consider esophageal motility testing and esophagram with 13 mm BT if no improvement with above.    Nausea.  Early satiety. No vomiting. Dm Gastroparesis (smartPill in 2016 with dalayed  gastric emptying).  EGD with gastric fluid and pill in  stomach.   Is on percocet weekly  Unable to tolerate reglan due to had twitching.   Sometimes better with chewing spearmint gum  Some improvement with phenergan IV  -increase zofran 8 mg to TID  -Again discussed gastroparesis diet .  -Has seen GI dietitian  -Has not tried compazine, domperidone, scopolamine patch        DM type 2. Diagnosed 10 year ago. BS running 90-120s. Last hba1c 5.8 in 7/2018.   Taking metformin 1000 mg BID.   Previously followed by endocrinologist, currently followed by PCP.     Abdominal pain. Nocturnal pain. EGD negative. TTG/IgA negative. CT with diverticulitis.  On seroquel, effexor  On percocet  On melaxoicam  Insurance will not cover trulance.  -Start OTC FDgard as previously advised  -Restart linzess 290 mcg daily  -Has not tried IBgard, Bentyl, Levsin, Levbid.    Gas and bloating. ?distenition?  Avoids artificial sugars.  No improvement with lactose elimination  -Check send out SIBO testing    Constipation.  Normal colon transit.  Labs negative.   On daily probiotic x 1 month.  Will advise to stop at next visit  No improvement with miralax once daily  No improvement with  Amitiza ?dose? BID  -Insurance does not cover trulance.  -Start linzess 290 mcg daily and miralax 4 times daily   -Will consider MRI defecography  -Will consider ARM    Possible prolapse.  -Will consider MRI Defecogram at next visit     Weight fluctuates.     History of  h. Pylori in 2016. Unsure if treated. EGD negative.  -Check stools for h pylori off PPI.     Anemia. Low hgb. Labs with persistent, mild KASH. EGD/colon negative      CT w possible diverticulitis vs epiploic appendagitis. Treated with Augmentin tid x 10days to treat possible diverticulitis. This will also cover SIBO.  Had EGD/colon in >10 weeks after to assure healing of diverticulitis    Fatty liver.     Family history of colon cancer. Mother dx in 50s, MGM ?dx ?. Colonoscopy with 3 mm cecal TA and hyperplastic polyps. Repeat in 5 years  (2024).    Anxiety. Depression.   Some improvement with seroquel 25 mg HS, also takes effexor 75 mg HS (for HAs).   Has seen psych in the past. Followed by PCP.    Insomnia. Not on meds. Diagnosed with SHARATH. Not on cpap/does not like it.     Migraines. Seizures. Some improvement with tompiramate 300 mg  BID, meloxicam 15 mg daily, imitrex 50 mg PRN, also taking effexor 75 mg HS, and aimovig once monthly Per neruology.    Plantar fascitis. Taking percocet 5-325 mg once weekly since 2/2018. Was on a different narcotic for one month prior to this.   -Discussed the role of narcotic pain medication in motility and functional gastrointestinal disorders.  Will attempt to limit narcotic use.   -Followed by pain management and podietry    Joint pain. Joint swelling. Knees. Hands. Back. ESR/CRP high  Seen by rheumatology w/ negative work up.    Follow-up in about 2 months (around 4/5/2019) for Motility w Dr. Sweeney.    1. History of Helicobacter pylori infection    2. Chronic constipation    3. Gastroesophageal reflux disease without esophagitis    4. Esophageal dysphagia    5. Bloating    6. Nausea    7. Early satiety    8. Constipation, unspecified constipation type    9. LUQ abdominal pain    10. Chest pain, unspecified type          Order summary:  Orders Placed This Encounter    H. pylori antigen, stool    linaclotide (LINZESS) 290 mcg Cap    pantoprazole (PROTONIX) 40 MG tablet         Thank you so much for allowing me to participate in the care of Efrainmichelleivan Efren Pinto, APRN, FNP-C

## 2019-02-12 ENCOUNTER — LAB VISIT (OUTPATIENT)
Dept: LAB | Facility: HOSPITAL | Age: 49
End: 2019-02-12
Attending: NURSE PRACTITIONER
Payer: MEDICARE

## 2019-02-12 DIAGNOSIS — Z86.19 HISTORY OF HELICOBACTER PYLORI INFECTION: ICD-10-CM

## 2019-02-12 DIAGNOSIS — J30.2 SEASONAL ALLERGIES: ICD-10-CM

## 2019-02-12 PROCEDURE — 87338 HPYLORI STOOL AG IA: CPT | Mod: HCNC

## 2019-02-12 RX ORDER — MONTELUKAST SODIUM 10 MG/1
TABLET ORAL
Qty: 30 TABLET | Refills: 0 | Status: SHIPPED | OUTPATIENT
Start: 2019-02-12 | End: 2019-03-03 | Stop reason: SDUPTHER

## 2019-02-15 ENCOUNTER — PES CALL (OUTPATIENT)
Dept: ADMINISTRATIVE | Facility: CLINIC | Age: 49
End: 2019-02-15

## 2019-02-16 LAB — H PYLORI AG STL QL IA: DETECTED

## 2019-02-28 ENCOUNTER — DOCUMENTATION ONLY (OUTPATIENT)
Dept: GASTROENTEROLOGY | Facility: CLINIC | Age: 49
End: 2019-02-28

## 2019-02-28 ENCOUNTER — PATIENT MESSAGE (OUTPATIENT)
Dept: GASTROENTEROLOGY | Facility: CLINIC | Age: 49
End: 2019-02-28

## 2019-02-28 DIAGNOSIS — A04.8 H. PYLORI INFECTION: Primary | ICD-10-CM

## 2019-02-28 RX ORDER — CLARITHROMYCIN 500 MG/1
500 TABLET, FILM COATED ORAL 2 TIMES DAILY
Qty: 28 TABLET | Refills: 0 | Status: SHIPPED | OUTPATIENT
Start: 2019-02-28 | End: 2019-03-14

## 2019-02-28 RX ORDER — AMOXICILLIN 500 MG/1
1000 CAPSULE ORAL 2 TIMES DAILY
Qty: 56 CAPSULE | Refills: 0 | Status: SHIPPED | OUTPATIENT
Start: 2019-02-28 | End: 2019-03-14

## 2019-03-03 DIAGNOSIS — J30.2 SEASONAL ALLERGIES: ICD-10-CM

## 2019-03-04 ENCOUNTER — TELEPHONE (OUTPATIENT)
Dept: GASTROENTEROLOGY | Facility: CLINIC | Age: 49
End: 2019-03-04

## 2019-03-04 DIAGNOSIS — A04.8 H. PYLORI INFECTION: Primary | ICD-10-CM

## 2019-03-04 RX ORDER — MONTELUKAST SODIUM 10 MG/1
TABLET ORAL
Qty: 30 TABLET | Refills: 0 | Status: SHIPPED | OUTPATIENT
Start: 2019-03-04 | End: 2020-05-13

## 2019-03-04 NOTE — TELEPHONE ENCOUNTER
----- Message from Jennifer Mcfarlane sent at 3/4/2019  9:48 AM CST -----  Contact: America Dee#[phone]  Needs Advice    Reason for call:drug interaction with BIAXIN and Seroquel   Communication Preference:call    Additional Information:

## 2019-03-07 RX ORDER — METRONIDAZOLE 250 MG/1
250 TABLET ORAL 4 TIMES DAILY
Qty: 40 TABLET | Refills: 0 | Status: SHIPPED | OUTPATIENT
Start: 2019-03-07 | End: 2019-03-17

## 2019-03-07 RX ORDER — TETRACYCLINE HYDROCHLORIDE 250 MG/1
500 CAPSULE ORAL EVERY 6 HOURS
Qty: 80 CAPSULE | Refills: 0 | Status: SHIPPED | OUTPATIENT
Start: 2019-03-07 | End: 2019-03-14

## 2019-03-07 NOTE — TELEPHONE ENCOUNTER
Please inform pt: therapy to treat h. Pylori changed from clarithromycin and amoxicillin (since there is a potential drug interaction between clarithromycin and Seroquel)  to flagyl, tetracycline, pepto bismol and continue twice daily protonix.     Thanks,  Tiff, NP

## 2019-03-13 ENCOUNTER — OFFICE VISIT (OUTPATIENT)
Dept: FAMILY MEDICINE | Facility: CLINIC | Age: 49
End: 2019-03-13
Payer: MEDICARE

## 2019-03-13 VITALS
OXYGEN SATURATION: 100 % | TEMPERATURE: 98 F | HEIGHT: 64 IN | DIASTOLIC BLOOD PRESSURE: 84 MMHG | RESPIRATION RATE: 16 BRPM | SYSTOLIC BLOOD PRESSURE: 135 MMHG | HEART RATE: 74 BPM | WEIGHT: 293 LBS | BODY MASS INDEX: 50.02 KG/M2

## 2019-03-13 DIAGNOSIS — Z91.09 ENVIRONMENTAL ALLERGIES: Primary | ICD-10-CM

## 2019-03-13 DIAGNOSIS — J31.0 CHRONIC RHINITIS: ICD-10-CM

## 2019-03-13 PROCEDURE — 3075F PR MOST RECENT SYSTOLIC BLOOD PRESS GE 130-139MM HG: ICD-10-PCS | Mod: HCNC,CPTII,S$GLB, | Performed by: NURSE PRACTITIONER

## 2019-03-13 PROCEDURE — 3079F PR MOST RECENT DIASTOLIC BLOOD PRESSURE 80-89 MM HG: ICD-10-PCS | Mod: HCNC,CPTII,S$GLB, | Performed by: NURSE PRACTITIONER

## 2019-03-13 PROCEDURE — 3008F PR BODY MASS INDEX (BMI) DOCUMENTED: ICD-10-PCS | Mod: HCNC,CPTII,S$GLB, | Performed by: NURSE PRACTITIONER

## 2019-03-13 PROCEDURE — 99214 PR OFFICE/OUTPT VISIT, EST, LEVL IV, 30-39 MIN: ICD-10-PCS | Mod: HCNC,S$GLB,, | Performed by: NURSE PRACTITIONER

## 2019-03-13 PROCEDURE — 3008F BODY MASS INDEX DOCD: CPT | Mod: HCNC,CPTII,S$GLB, | Performed by: NURSE PRACTITIONER

## 2019-03-13 PROCEDURE — 99999 PR PBB SHADOW E&M-EST. PATIENT-LVL IV: ICD-10-PCS | Mod: PBBFAC,HCNC,, | Performed by: NURSE PRACTITIONER

## 2019-03-13 PROCEDURE — 3075F SYST BP GE 130 - 139MM HG: CPT | Mod: HCNC,CPTII,S$GLB, | Performed by: NURSE PRACTITIONER

## 2019-03-13 PROCEDURE — 99214 OFFICE O/P EST MOD 30 MIN: CPT | Mod: HCNC,S$GLB,, | Performed by: NURSE PRACTITIONER

## 2019-03-13 PROCEDURE — 99999 PR PBB SHADOW E&M-EST. PATIENT-LVL IV: CPT | Mod: PBBFAC,HCNC,, | Performed by: NURSE PRACTITIONER

## 2019-03-13 PROCEDURE — 3079F DIAST BP 80-89 MM HG: CPT | Mod: HCNC,CPTII,S$GLB, | Performed by: NURSE PRACTITIONER

## 2019-03-13 RX ORDER — FLUTICASONE PROPIONATE 50 MCG
1 SPRAY, SUSPENSION (ML) NASAL DAILY
Qty: 15.8 ML | Refills: 2 | Status: SHIPPED | OUTPATIENT
Start: 2019-03-13 | End: 2019-06-18 | Stop reason: SDUPTHER

## 2019-03-13 NOTE — PROGRESS NOTES
Routine Office Visit    Patient Name: Yanni Kaiser    : 1970  MRN: [id number]    Chief Complaint:  Sinus Pain    Subjective:  Yanni is a 48 y.o. female who presents today for:    1.  Sinus pain - patient reports today with a 2 day history of sinus pain. She states the pain started 2 days ago and worsened yesterday.  The pain is frontal and she feels it when she puts her glasses on.  She also reports some nasal congestion and postnasal drip as well, but denies any runny nose sore throat or cough.  Denies fevers or chills.  She has gotten the flu shot this year and denies any sick contacts.  She does report a history of allergies states she takes Claritin and Singulair daily.  She has not seen an ENT in some time.  Of note she is on amoxicillin 1 g 2 times per day for H. pylori infection.    Past Medical History  Past Medical History:   Diagnosis Date    Allergy     Anemia     Asthma     Depression     Diabetes with neurologic complications     GERD (gastroesophageal reflux disease)     High cholesterol     Hyperprolactinemia     Hypertension     Pseudotumor cerebri     Seizures     Simple endometrial hyperplasia 2018    Sleep apnea     Smoker        Past Surgical History  Past Surgical History:   Procedure Laterality Date    BREAST CYST ASPIRATION       SECTION      X 1    CHOLECYSTECTOMY      COLONOSCOPY      COLONOSCOPY N/A 2019    [facility] at [facility] (2ND FLR)    EGD (ESOPHAGOGASTRODUODENOSCOPY) N/A 2019    Performed by [facility] at [facility] ENDO (2ND FLR)    HYSTEROSCOPY, WITH DILATION AND CURETTAGE OF UTERUS N/A 10/16/2018    Performed by Glenn Black Jr., [facility] at [facility] OR    SINUS SURGERY      UPPER GASTROINTESTINAL ENDOSCOPY         Family History  Family History   Problem Relation Age of Onset    Hypertension Mother     Diabetes Mother     Colon cancer Mother 50    Colon polyps Mother     Heart disease Father      COPD Father     Heart attack Father     Hypertension Father     Ulcers Father     Cancer Maternal Grandmother     Breast cancer Maternal Grandmother     Colon cancer Maternal Grandmother     Colon polyps Maternal Grandmother     Celiac disease Neg Hx     Cirrhosis Neg Hx     Crohn's disease Neg Hx     Cystic fibrosis Neg Hx     Esophageal cancer Neg Hx     Hemochromatosis Neg Hx     Inflammatory bowel disease Neg Hx     Irritable bowel syndrome Neg Hx     Liver cancer Neg Hx     Liver disease Neg Hx     Rectal cancer Neg Hx     Stomach cancer Neg Hx     Ulcerative colitis Neg Hx     Jonnie's disease Neg Hx     Tuberculosis Neg Hx     Lymphoma Neg Hx     Scleroderma Neg Hx     Rheum arthritis Neg Hx     Melanoma Neg Hx     Multiple sclerosis Neg Hx     Psoriasis Neg Hx     Lupus Neg Hx     Skin cancer Neg Hx        Social History  Social History     Socioeconomic History    Marital status:      Spouse name: Not on file    Number of children: Not on file    Years of education: Not on file    Highest education level: Not on file   Social Needs    Financial resource strain: Not on file    Food insecurity - worry: Not on file    Food insecurity - inability: Not on file    Transportation needs - medical: Not on file    Transportation needs - non-medical: Not on file   Occupational History    Not on file   Tobacco Use    Smoking status: Current Every Day Smoker     Packs/day: 1.00     Years: 15.00     Pack years: 15.00     Types: Cigarettes    Smokeless tobacco: Never Used   Substance and Sexual Activity    Alcohol use: No     Alcohol/week: 0.0 oz    Drug use: No    Sexual activity: Yes     Partners: Male     Birth control/protection: None   Other Topics Concern    Not on file   Social History Narrative    Not on file       Current Medications  Current Outpatient Medications on File Prior to Visit   Medication Sig Dispense Refill    acetaZOLAMIDE (DIAMOX) 500 mg CpSR  Take 1 capsule (500 mg total) by mouth 2 (two) times daily. 360 capsule 1    albuterol 90 mcg/actuation inhaler Inhale 2 puffs into the lungs every 6 (six) hours as needed for Wheezing. Rescue 1 Inhaler 0    albuterol-ipratropium (DUO-NEB) 2.5 mg-0.5 mg/3 mL nebulizer solution Take 3 mLs by nebulization every 6 (six) hours as needed for Wheezing or Shortness of Breath. Rescue 100 mL 3    amoxicillin (AMOXIL) 500 MG capsule Take 2 capsules (1,000 mg total) by mouth 2 (two) times daily. for 14 days 56 capsule 0    bismuth subsalicylate (BISMUTH) 262 mg Tab Take 2 tablets by mouth 4 (four) times daily. for 10 days 80 tablet 0    blood sugar diagnostic Strp 1 each by Misc.(Non-Drug; Combo Route) route 4 (four) times daily before meals and nightly. ACCU CHEK ELVIA PLUS METER 200 each 3    blood-glucose meter (ACCU-CHEK ELVIA PLUS METER) Misc TEST FOUR TIMES DAILY BEFORE MEALS AND EVERY NIGHT 90 each 1    budesonide-formoterol 160-4.5 mcg (SYMBICORT) 160-4.5 mcg/actuation HFAA Inhale 2 puffs into the lungs every 12 (twelve) hours. Controller 1 Inhaler 5    capsaicin 0.1 % Crea Apply 1 application topically 2 (two) times daily. 56.6 g 1    clarithromycin (BIAXIN) 500 MG tablet Take 1 tablet (500 mg total) by mouth 2 (two) times daily. for 14 days 28 tablet 0    GAVILYTE-G 236-22.74-6.74 -5.86 gram suspension   0    hydroCHLOROthiazide (HYDRODIURIL) 25 MG tablet Take 1 tablet (25 mg total) by mouth once daily. 30 tablet 11    hydrOXYzine (ATARAX) 50 MG tablet Take 1-4 at night for itching. 120 tablet 3    lancets (ACCU-CHEK SOFTCLIX LANCETS) Misc 1 Device by Misc.(Non-Drug; Combo Route) route 4 (four) times daily. 200 each 3    levocetirizine (XYZAL) 5 MG tablet Take 1 tablet (5 mg total) by mouth every evening. 30 tablet 11    linaclotide (LINZESS) 290 mcg Cap Take 1 capsule (290 mcg total) by mouth once daily. 90 capsule 3    losartan (COZAAR) 100 MG tablet TAKE 1 TABLET BY MOUTH EVERY DAY 90 tablet 0     medroxyPROGESTERone (PROVERA) 10 MG tablet Take 1 tablet (10 mg total) by mouth once daily. TAKE DAY 1-12 Q MONTH 12 tablet 12    meloxicam (MOBIC) 15 MG tablet TAKE 1 TABLET(15 MG) BY MOUTH DAILY AS NEEDED FOR HEADACHE 90 tablet 1    metFORMIN (GLUCOPHAGE-XR) 500 MG 24 hr tablet Take 2 tablets (1,000 mg total) by mouth 2 (two) times daily with meals. 360 tablet 0    metroNIDAZOLE (FLAGYL) 250 MG tablet Take 1 tablet (250 mg total) by mouth 4 (four) times daily. for 10 days 40 tablet 0    mometasone 0.1% (ELOCON) 0.1 % cream       montelukast (SINGULAIR) 10 mg tablet TAKE 1 TABLET(10 MG) BY MOUTH EVERY EVENING 30 tablet 0    naproxen (EC NAPROSYN) 500 MG EC tablet Take 1 tablet (500 mg total) by mouth 2 (two) times daily. 30 tablet 3    nicotine polacrilex 2 MG Lozg Take 1 lozenge (2 mg total) by mouth as needed. 168 lozenge 0    ondansetron (ZOFRAN) 8 MG tablet Take 1 tablet (8 mg total) by mouth every 8 (eight) hours as needed for Nausea. 90 tablet 5    oxyCODONE-acetaminophen (PERCOCET)  mg per tablet TAKE 1 TABLET BY MOUTH EVERY 8 HOURS AS NEEDED. MAY CAUSE DROWSINESS  0    pantoprazole (PROTONIX) 40 MG tablet Take 1 tablet (40 mg total) by mouth 2 (two) times daily. 180 tablet 3    QUEtiapine (SEROQUEL) 25 MG Tab TAKE 1 TABLET(25 MG) BY MOUTH EVERY EVENING 90 tablet 0    sumatriptan (IMITREX) 50 MG tablet Take 1 tab at onset of headaches.  If no improvement in 2 hours, take another.  Do not take more than 2 tabs in 24 hours. 9 tablet 5    tetracycline (ACHROMYCIN,SUMYCIN) 250 MG capsule Take 2 capsules (500 mg total) by mouth every 6 (six) hours. for 10 days 80 capsule 0    tiotropium (SPIRIVA) 18 mcg inhalation capsule Inhale 1 capsule (18 mcg total) into the lungs once daily. Controller 30 capsule 11    tiZANidine (ZANAFLEX) 4 MG tablet Take 4 mg by mouth every 8 (eight) hours.       topiramate (TOPAMAX) 100 MG tablet Take 3 tablets (300 mg total) by mouth 2 (two) times daily. 180 tablet  "5    venlafaxine (EFFEXOR-XR) 75 MG 24 hr capsule TAKE 1 CAPSULE(75 MG) BY MOUTH EVERY EVENING 90 capsule 1    [DISCONTINUED] fluticasone (FLONASE) 50 mcg/actuation nasal spray 1 spray (50 mcg total) by Each Nare route once daily. 16 g 5     No current facility-administered medications on file prior to visit.        Allergies   Review of patient's allergies indicates:  No Known Allergies    Review of Systems (Pertinent positives)  Review of Systems   Constitutional: Negative for chills and fever.   HENT: Positive for congestion and sinus pain. Negative for ear discharge, ear pain, hearing loss, nosebleeds, sore throat and tinnitus.    Eyes: Negative.    Respiratory: Negative for cough, hemoptysis, sputum production, shortness of breath, wheezing and stridor.    Cardiovascular: Negative.    Gastrointestinal: Negative.    Genitourinary: Negative.    Musculoskeletal: Negative.    Neurological: Negative.    Endo/Heme/Allergies: Negative.    Psychiatric/Behavioral: Negative.        /84 (BP Location: Right arm, Patient Position: Sitting, BP Method: Large (Automatic))   Pulse 74   Temp 97.6 °F (36.4 °C)   Resp 16   Ht 5' 4" (1.626 m)   Wt (!) 168.6 kg (371 lb 12.8 oz)   SpO2 100%   BMI 63.82 kg/m²     Physical Exam   Constitutional: She is oriented to person, place, and time. She appears well-developed and well-nourished. No distress.   HENT:   Head: Normocephalic and atraumatic.   Right Ear: Tympanic membrane, external ear and ear canal normal.   Left Ear: Tympanic membrane, external ear and ear canal normal.   Nose: No mucosal edema or rhinorrhea. Right sinus exhibits frontal sinus tenderness. Right sinus exhibits no maxillary sinus tenderness. Left sinus exhibits frontal sinus tenderness. Left sinus exhibits no maxillary sinus tenderness.   Mouth/Throat: Uvula is midline, oropharynx is clear and moist and mucous membranes are normal.   No cervical LA   Eyes: Conjunctivae and EOM are normal. Pupils are " equal, round, and reactive to light.   Neck: Normal range of motion. Neck supple.   Cardiovascular: Normal rate, regular rhythm, normal heart sounds and intact distal pulses. Exam reveals no gallop and no friction rub.   No murmur heard.  Pulmonary/Chest: Effort normal and breath sounds normal. No stridor. No respiratory distress. She has no wheezes. She has no rales. She exhibits no tenderness.   Abdominal: Soft. Bowel sounds are normal. She exhibits no distension and no mass. There is no tenderness. There is no rebound and no guarding.   Musculoskeletal: Normal range of motion.   Neurological: She is alert and oriented to person, place, and time.   Skin: Skin is warm and dry. Capillary refill takes less than 2 seconds. She is not diaphoretic.        Assessment/Plan:  Yanni Kaiser is a 48 y.o. female who presents today for :    Yanni was seen today for cough, nasal congestion, otalgia, sinus problem and sinusitis.    Diagnoses and all orders for this visit:    Environmental allergies  -     fluticasone (FLONASE) 50 mcg/actuation nasal spray; 1 spray (50 mcg total) by Each Nare route once daily.  -     Ambulatory referral to ENT    Chronic rhinitis  -     fluticasone (FLONASE) 50 mcg/actuation nasal spray; 1 spray (50 mcg total) by Each Nare route once daily.  -     Ambulatory referral to ENT     No bacterial nidus on exam.  She does have a history of chronic allergies and has not seen an ENT in some time.  Will refer.  Will not treat with antibiotics at this time as she is already on amoxicillin daily.  She also reports not taking Flonase consistently.  Will fill that today, emphasized the importance of taking the Flonase every single day.  Continue Claritin and Singulair.  Drink plenty of water.  Follow-up if no relief in 1-2 weeks.        This office note has been dictated.  This dictation has been generated using M-Modal Fluency Direct dictation; some phonetic errors may occur.   My collaborating  physician is Dr. Jose Rausch.

## 2019-03-14 ENCOUNTER — TELEPHONE (OUTPATIENT)
Dept: GASTROENTEROLOGY | Facility: CLINIC | Age: 49
End: 2019-03-14

## 2019-03-14 DIAGNOSIS — A04.8 H. PYLORI INFECTION: Primary | ICD-10-CM

## 2019-03-14 RX ORDER — DOXYCYCLINE 100 MG/1
100 CAPSULE ORAL EVERY 12 HOURS
Qty: 28 CAPSULE | Refills: 0 | Status: SHIPPED | OUTPATIENT
Start: 2019-03-14 | End: 2019-03-28

## 2019-03-14 NOTE — TELEPHONE ENCOUNTER
Yes. I have sent a RX for doxycyline to replace the tetracycline. This should be more affordable    Cher Keys NP  ----- Message from Asia Mishra MA sent at 3/13/2019  2:27 PM CDT -----  Contact: [facility]       ----- Message -----  From: Maria Fernanda Damian  Sent: 3/13/2019   1:37 PM  To: Millie Urbina Staff    Millie    Needs Advice    Reason for call: states tetracycline is too expensive - can something else be called in         Communication Preference: [facility]     Additional Information:

## 2019-03-20 ENCOUNTER — HOSPITAL ENCOUNTER (OUTPATIENT)
Facility: HOSPITAL | Age: 49
Discharge: HOME | End: 2019-03-21
Attending: EMERGENCY MEDICINE | Admitting: HOSPITALIST
Payer: MEDICARE

## 2019-03-20 DIAGNOSIS — J45.21 EXACERBATION OF INTERMITTENT ASTHMA, UNSPECIFIED ASTHMA SEVERITY: ICD-10-CM

## 2019-03-20 DIAGNOSIS — R06.82 TACHYPNEA: ICD-10-CM

## 2019-03-20 DIAGNOSIS — J45.901 EXACERBATION OF ASTHMA, UNSPECIFIED ASTHMA SEVERITY, UNSPECIFIED WHETHER PERSISTENT: Primary | ICD-10-CM

## 2019-03-20 DIAGNOSIS — R06.02 SHORTNESS OF BREATH: ICD-10-CM

## 2019-03-20 LAB
ALBUMIN SERPL BCP-MCNC: 3.2 G/DL
ALP SERPL-CCNC: 79 U/L
ALT SERPL W/O P-5'-P-CCNC: 13 U/L
ANION GAP SERPL CALC-SCNC: 6 MMOL/L
AST SERPL-CCNC: 13 U/L
BASOPHILS # BLD AUTO: 0.04 K/UL
BASOPHILS NFR BLD: 1.1 %
BILIRUB SERPL-MCNC: 0.4 MG/DL
BNP SERPL-MCNC: <10 PG/ML
BUN SERPL-MCNC: 9 MG/DL
CALCIUM SERPL-MCNC: 9 MG/DL
CHLORIDE SERPL-SCNC: 109 MMOL/L
CO2 SERPL-SCNC: 25 MMOL/L
CREAT SERPL-MCNC: 1.3 MG/DL
DIFFERENTIAL METHOD: ABNORMAL
EOSINOPHIL # BLD AUTO: 0.3 K/UL
EOSINOPHIL NFR BLD: 7.6 %
ERYTHROCYTE [DISTWIDTH] IN BLOOD BY AUTOMATED COUNT: 16 %
EST. GFR  (AFRICAN AMERICAN): 57 ML/MIN/1.73 M^2
EST. GFR  (NON AFRICAN AMERICAN): 49 ML/MIN/1.73 M^2
GLUCOSE SERPL-MCNC: 119 MG/DL
HCT VFR BLD AUTO: 38.3 %
HGB BLD-MCNC: 12.1 G/DL
INR PPP: 1
LACTATE SERPL-SCNC: 1.1 MMOL/L
LYMPHOCYTES # BLD AUTO: 1 K/UL
LYMPHOCYTES NFR BLD: 26.6 %
MAGNESIUM SERPL-MCNC: 2.1 MG/DL
MCH RBC QN AUTO: 27.8 PG
MCHC RBC AUTO-ENTMCNC: 31.6 G/DL
MCV RBC AUTO: 88 FL
MONOCYTES # BLD AUTO: 0.5 K/UL
MONOCYTES NFR BLD: 13.7 %
NEUTROPHILS # BLD AUTO: 1.9 K/UL
NEUTROPHILS NFR BLD: 51 %
PLATELET # BLD AUTO: 298 K/UL
PMV BLD AUTO: 9.1 FL
POCT GLUCOSE: 179 MG/DL (ref 70–110)
POCT GLUCOSE: 239 MG/DL (ref 70–110)
POTASSIUM SERPL-SCNC: 3.5 MMOL/L
PROT SERPL-MCNC: 7.1 G/DL
PROTHROMBIN TIME: 10.2 SEC
RBC # BLD AUTO: 4.35 M/UL
SODIUM SERPL-SCNC: 140 MMOL/L
TROPONIN I SERPL DL<=0.01 NG/ML-MCNC: 0.01 NG/ML
TROPONIN I SERPL DL<=0.01 NG/ML-MCNC: 0.01 NG/ML
WBC # BLD AUTO: 3.8 K/UL

## 2019-03-20 PROCEDURE — 93010 ELECTROCARDIOGRAM REPORT: CPT | Mod: HCNC,,, | Performed by: INTERNAL MEDICINE

## 2019-03-20 PROCEDURE — 96375 TX/PRO/DX INJ NEW DRUG ADDON: CPT | Mod: HCNC

## 2019-03-20 PROCEDURE — 36415 COLL VENOUS BLD VENIPUNCTURE: CPT | Mod: HCNC

## 2019-03-20 PROCEDURE — 25000003 PHARM REV CODE 250: Mod: HCNC | Performed by: EMERGENCY MEDICINE

## 2019-03-20 PROCEDURE — G0378 HOSPITAL OBSERVATION PER HR: HCPCS | Mod: HCNC

## 2019-03-20 PROCEDURE — 25000242 PHARM REV CODE 250 ALT 637 W/ HCPCS: Mod: HCNC | Performed by: EMERGENCY MEDICINE

## 2019-03-20 PROCEDURE — 99291 CRITICAL CARE FIRST HOUR: CPT | Mod: 25,HCNC

## 2019-03-20 PROCEDURE — 83605 ASSAY OF LACTIC ACID: CPT | Mod: HCNC

## 2019-03-20 PROCEDURE — 94640 AIRWAY INHALATION TREATMENT: CPT | Mod: HCNC

## 2019-03-20 PROCEDURE — 83880 ASSAY OF NATRIURETIC PEPTIDE: CPT | Mod: HCNC

## 2019-03-20 PROCEDURE — 96360 HYDRATION IV INFUSION INIT: CPT | Mod: 59,HCNC

## 2019-03-20 PROCEDURE — 93005 ELECTROCARDIOGRAM TRACING: CPT | Mod: HCNC

## 2019-03-20 PROCEDURE — 93010 EKG 12-LEAD: ICD-10-PCS | Mod: HCNC,,, | Performed by: INTERNAL MEDICINE

## 2019-03-20 PROCEDURE — 25000242 PHARM REV CODE 250 ALT 637 W/ HCPCS: Mod: HCNC | Performed by: NURSE PRACTITIONER

## 2019-03-20 PROCEDURE — 96361 HYDRATE IV INFUSION ADD-ON: CPT | Mod: HCNC

## 2019-03-20 PROCEDURE — 80053 COMPREHEN METABOLIC PANEL: CPT | Mod: HCNC

## 2019-03-20 PROCEDURE — 87040 BLOOD CULTURE FOR BACTERIA: CPT | Mod: 59,HCNC

## 2019-03-20 PROCEDURE — 83036 HEMOGLOBIN GLYCOSYLATED A1C: CPT | Mod: HCNC

## 2019-03-20 PROCEDURE — 63600175 PHARM REV CODE 636 W HCPCS: Mod: HCNC | Performed by: EMERGENCY MEDICINE

## 2019-03-20 PROCEDURE — 96368 THER/DIAG CONCURRENT INF: CPT | Mod: HCNC

## 2019-03-20 PROCEDURE — 85610 PROTHROMBIN TIME: CPT | Mod: HCNC

## 2019-03-20 PROCEDURE — 96365 THER/PROPH/DIAG IV INF INIT: CPT | Mod: HCNC

## 2019-03-20 PROCEDURE — 83735 ASSAY OF MAGNESIUM: CPT | Mod: HCNC

## 2019-03-20 PROCEDURE — 63600175 PHARM REV CODE 636 W HCPCS: Mod: HCNC

## 2019-03-20 PROCEDURE — 96366 THER/PROPH/DIAG IV INF ADDON: CPT | Mod: HCNC

## 2019-03-20 PROCEDURE — 99900035 HC TECH TIME PER 15 MIN (STAT): Mod: HCNC

## 2019-03-20 PROCEDURE — 96376 TX/PRO/DX INJ SAME DRUG ADON: CPT

## 2019-03-20 PROCEDURE — 94761 N-INVAS EAR/PLS OXIMETRY MLT: CPT | Mod: HCNC

## 2019-03-20 PROCEDURE — 94644 CONT INHLJ TX 1ST HOUR: CPT | Mod: HCNC

## 2019-03-20 PROCEDURE — 96372 THER/PROPH/DIAG INJ SC/IM: CPT | Mod: 59

## 2019-03-20 PROCEDURE — 84484 ASSAY OF TROPONIN QUANT: CPT | Mod: 91,HCNC

## 2019-03-20 PROCEDURE — 94645 CONT INHLJ TX EACH ADDL HOUR: CPT | Mod: HCNC

## 2019-03-20 PROCEDURE — 25000003 PHARM REV CODE 250: Mod: HCNC | Performed by: PHYSICIAN ASSISTANT

## 2019-03-20 PROCEDURE — 85025 COMPLETE CBC W/AUTO DIFF WBC: CPT | Mod: HCNC

## 2019-03-20 PROCEDURE — 27000221 HC OXYGEN, UP TO 24 HOURS: Mod: HCNC

## 2019-03-20 RX ORDER — IBUPROFEN 200 MG
1 TABLET ORAL DAILY
Status: DISCONTINUED | OUTPATIENT
Start: 2019-03-21 | End: 2019-03-21 | Stop reason: HOSPADM

## 2019-03-20 RX ORDER — IPRATROPIUM BROMIDE 0.5 MG/2.5ML
1 SOLUTION RESPIRATORY (INHALATION)
Status: COMPLETED | OUTPATIENT
Start: 2019-03-20 | End: 2019-03-20

## 2019-03-20 RX ORDER — MAGNESIUM SULFATE HEPTAHYDRATE 40 MG/ML
INJECTION, SOLUTION INTRAVENOUS
Status: COMPLETED
Start: 2019-03-20 | End: 2019-03-20

## 2019-03-20 RX ORDER — ACETAZOLAMIDE 500 MG/1
500 CAPSULE, EXTENDED RELEASE ORAL 2 TIMES DAILY
Status: DISCONTINUED | OUTPATIENT
Start: 2019-03-20 | End: 2019-03-20

## 2019-03-20 RX ORDER — ONDANSETRON 4 MG/1
4 TABLET, ORALLY DISINTEGRATING ORAL EVERY 6 HOURS PRN
Status: DISCONTINUED | OUTPATIENT
Start: 2019-03-20 | End: 2019-03-21 | Stop reason: HOSPADM

## 2019-03-20 RX ORDER — ACETAZOLAMIDE 500 MG/1
500 CAPSULE, EXTENDED RELEASE ORAL 2 TIMES DAILY
Status: DISCONTINUED | OUTPATIENT
Start: 2019-03-20 | End: 2019-03-21 | Stop reason: HOSPADM

## 2019-03-20 RX ORDER — LOSARTAN POTASSIUM 25 MG/1
100 TABLET ORAL DAILY
Status: DISCONTINUED | OUTPATIENT
Start: 2019-03-21 | End: 2019-03-21 | Stop reason: HOSPADM

## 2019-03-20 RX ORDER — ALBUTEROL SULFATE 2.5 MG/.5ML
10 SOLUTION RESPIRATORY (INHALATION)
Status: COMPLETED | OUTPATIENT
Start: 2019-03-20 | End: 2019-03-20

## 2019-03-20 RX ORDER — IBUPROFEN 200 MG
16 TABLET ORAL
Status: DISCONTINUED | OUTPATIENT
Start: 2019-03-20 | End: 2019-03-21 | Stop reason: HOSPADM

## 2019-03-20 RX ORDER — METRONIDAZOLE 500 MG/1
500 TABLET ORAL 4 TIMES DAILY
Status: DISCONTINUED | OUTPATIENT
Start: 2019-03-20 | End: 2019-03-21 | Stop reason: HOSPADM

## 2019-03-20 RX ORDER — PANTOPRAZOLE SODIUM 40 MG/1
40 TABLET, DELAYED RELEASE ORAL 2 TIMES DAILY
Status: DISCONTINUED | OUTPATIENT
Start: 2019-03-20 | End: 2019-03-21 | Stop reason: HOSPADM

## 2019-03-20 RX ORDER — FLUTICASONE PROPIONATE 50 MCG
1 SPRAY, SUSPENSION (ML) NASAL DAILY
Status: DISCONTINUED | OUTPATIENT
Start: 2019-03-21 | End: 2019-03-21 | Stop reason: HOSPADM

## 2019-03-20 RX ORDER — METHYLPREDNISOLONE SOD SUCC 125 MG
125 VIAL (EA) INJECTION
Status: COMPLETED | OUTPATIENT
Start: 2019-03-20 | End: 2019-03-20

## 2019-03-20 RX ORDER — SODIUM CHLORIDE 0.9 % (FLUSH) 0.9 %
5 SYRINGE (ML) INJECTION
Status: DISCONTINUED | OUTPATIENT
Start: 2019-03-20 | End: 2019-03-21 | Stop reason: HOSPADM

## 2019-03-20 RX ORDER — PREDNISONE 20 MG/1
60 TABLET ORAL DAILY
Status: DISCONTINUED | OUTPATIENT
Start: 2019-03-21 | End: 2019-03-20

## 2019-03-20 RX ORDER — ACETAMINOPHEN 500 MG
500 TABLET ORAL EVERY 6 HOURS PRN
Status: DISCONTINUED | OUTPATIENT
Start: 2019-03-20 | End: 2019-03-21 | Stop reason: HOSPADM

## 2019-03-20 RX ORDER — INSULIN ASPART 100 [IU]/ML
1-10 INJECTION, SOLUTION INTRAVENOUS; SUBCUTANEOUS
Status: DISCONTINUED | OUTPATIENT
Start: 2019-03-20 | End: 2019-03-21 | Stop reason: HOSPADM

## 2019-03-20 RX ORDER — HYDROCHLOROTHIAZIDE 25 MG/1
25 TABLET ORAL DAILY
Status: DISCONTINUED | OUTPATIENT
Start: 2019-03-21 | End: 2019-03-21 | Stop reason: HOSPADM

## 2019-03-20 RX ORDER — TOPIRAMATE 100 MG/1
300 TABLET, FILM COATED ORAL 2 TIMES DAILY
Status: DISCONTINUED | OUTPATIENT
Start: 2019-03-20 | End: 2019-03-21 | Stop reason: HOSPADM

## 2019-03-20 RX ORDER — DOXYCYCLINE HYCLATE 100 MG
100 TABLET ORAL EVERY 12 HOURS
Status: DISCONTINUED | OUTPATIENT
Start: 2019-03-20 | End: 2019-03-21 | Stop reason: HOSPADM

## 2019-03-20 RX ORDER — GLUCAGON 1 MG
1 KIT INJECTION
Status: DISCONTINUED | OUTPATIENT
Start: 2019-03-20 | End: 2019-03-21 | Stop reason: HOSPADM

## 2019-03-20 RX ORDER — MAGNESIUM SULFATE HEPTAHYDRATE 40 MG/ML
2 INJECTION, SOLUTION INTRAVENOUS
Status: COMPLETED | OUTPATIENT
Start: 2019-03-20 | End: 2019-03-20

## 2019-03-20 RX ORDER — METHYLPREDNISOLONE SOD SUCC 125 MG
40 VIAL (EA) INJECTION EVERY 6 HOURS
Status: DISCONTINUED | OUTPATIENT
Start: 2019-03-21 | End: 2019-03-21 | Stop reason: HOSPADM

## 2019-03-20 RX ORDER — IBUPROFEN 200 MG
24 TABLET ORAL
Status: DISCONTINUED | OUTPATIENT
Start: 2019-03-20 | End: 2019-03-21 | Stop reason: HOSPADM

## 2019-03-20 RX ORDER — METHYLPREDNISOLONE SOD SUCC 125 MG
VIAL (EA) INJECTION
Status: COMPLETED
Start: 2019-03-20 | End: 2019-03-20

## 2019-03-20 RX ORDER — VENLAFAXINE HYDROCHLORIDE 75 MG/1
75 CAPSULE, EXTENDED RELEASE ORAL DAILY
Status: DISCONTINUED | OUTPATIENT
Start: 2019-03-21 | End: 2019-03-21 | Stop reason: HOSPADM

## 2019-03-20 RX ORDER — IPRATROPIUM BROMIDE AND ALBUTEROL SULFATE 2.5; .5 MG/3ML; MG/3ML
3 SOLUTION RESPIRATORY (INHALATION)
Status: COMPLETED | OUTPATIENT
Start: 2019-03-20 | End: 2019-03-21

## 2019-03-20 RX ORDER — QUETIAPINE FUMARATE 25 MG/1
50 TABLET, FILM COATED ORAL NIGHTLY
Status: DISCONTINUED | OUTPATIENT
Start: 2019-03-20 | End: 2019-03-21 | Stop reason: HOSPADM

## 2019-03-20 RX ORDER — FLUTICASONE FUROATE AND VILANTEROL 100; 25 UG/1; UG/1
1 POWDER RESPIRATORY (INHALATION) DAILY
Status: DISCONTINUED | OUTPATIENT
Start: 2019-03-21 | End: 2019-03-20

## 2019-03-20 RX ADMIN — DOXYCYCLINE HYCLATE 100 MG: 100 TABLET, COATED ORAL at 08:03

## 2019-03-20 RX ADMIN — SODIUM CHLORIDE 500 ML: 0.9 INJECTION, SOLUTION INTRAVENOUS at 02:03

## 2019-03-20 RX ADMIN — IPRATROPIUM BROMIDE 1 MG: 0.5 SOLUTION RESPIRATORY (INHALATION) at 02:03

## 2019-03-20 RX ADMIN — AZITHROMYCIN MONOHYDRATE 500 MG: 500 INJECTION, POWDER, LYOPHILIZED, FOR SOLUTION INTRAVENOUS at 06:03

## 2019-03-20 RX ADMIN — METHYLPREDNISOLONE SODIUM SUCCINATE 125 MG: 125 INJECTION, POWDER, FOR SOLUTION INTRAMUSCULAR; INTRAVENOUS at 03:03

## 2019-03-20 RX ADMIN — METHYLPREDNISOLONE SODIUM SUCCINATE 40 MG: 125 INJECTION, POWDER, FOR SOLUTION INTRAMUSCULAR; INTRAVENOUS at 11:03

## 2019-03-20 RX ADMIN — ACETAZOLAMIDE 500 MG: 500 CAPSULE, EXTENDED RELEASE ORAL at 10:03

## 2019-03-20 RX ADMIN — IPRATROPIUM BROMIDE AND ALBUTEROL SULFATE 3 ML: .5; 3 SOLUTION RESPIRATORY (INHALATION) at 09:03

## 2019-03-20 RX ADMIN — ALBUTEROL SULFATE 10 MG: 2.5 SOLUTION RESPIRATORY (INHALATION) at 02:03

## 2019-03-20 RX ADMIN — CEFTRIAXONE 2 G: 2 INJECTION, SOLUTION INTRAVENOUS at 04:03

## 2019-03-20 RX ADMIN — IPRATROPIUM BROMIDE 1 MG: 0.5 SOLUTION RESPIRATORY (INHALATION) at 05:03

## 2019-03-20 RX ADMIN — MAGNESIUM SULFATE HEPTAHYDRATE 2 G: 40 INJECTION, SOLUTION INTRAVENOUS at 03:03

## 2019-03-20 RX ADMIN — QUETIAPINE FUMARATE 50 MG: 25 TABLET ORAL at 08:03

## 2019-03-20 RX ADMIN — TOPIRAMATE 300 MG: 100 TABLET, FILM COATED ORAL at 08:03

## 2019-03-20 RX ADMIN — PANTOPRAZOLE SODIUM 40 MG: 40 TABLET, DELAYED RELEASE ORAL at 08:03

## 2019-03-20 RX ADMIN — ACETAMINOPHEN 500 MG: 500 TABLET, FILM COATED ORAL at 11:03

## 2019-03-20 RX ADMIN — ALBUTEROL SULFATE 10 MG: 2.5 SOLUTION RESPIRATORY (INHALATION) at 05:03

## 2019-03-20 RX ADMIN — INSULIN ASPART 2 UNITS: 100 INJECTION, SOLUTION INTRAVENOUS; SUBCUTANEOUS at 10:03

## 2019-03-20 RX ADMIN — METRONIDAZOLE 500 MG: 500 TABLET ORAL at 08:03

## 2019-03-20 RX ADMIN — Medication 125 MG: at 03:03

## 2019-03-20 NOTE — SUBJECTIVE & OBJECTIVE
Past Medical History:   Diagnosis Date    Allergy     Anemia     Asthma     Cigarette smoker     Depression     Diabetes with neurologic complications     GERD (gastroesophageal reflux disease)     High cholesterol     Hyperprolactinemia     Hypertension     Obese body habitus     Pseudotumor cerebri     Seizures     Simple endometrial hyperplasia 2018    Sleep apnea     Smoker        Past Surgical History:   Procedure Laterality Date    BREAST CYST ASPIRATION       SECTION      X 1    CHOLECYSTECTOMY      COLONOSCOPY      COLONOSCOPY N/A 2019    Performed by [facility] at [facility] ENDO (2ND FLR)    EGD (ESOPHAGOGASTRODUODENOSCOPY) N/A 2019    Performed by [facility]ey [facility] at [facility] ENDO (2ND FLR)    HYSTEROSCOPY, WITH DILATION AND CURETTAGE OF UTERUS N/A 10/16/2018    Performed by Glenn Black Jr., [facility] [place] [place]    SINUS SURGERY      UPPER GASTROINTESTINAL ENDOSCOPY         Review of patient's allergies indicates:  No Known Allergies    No current facility-administered medications on file prior to encounter.      Current Outpatient Medications on File Prior to Encounter   Medication Sig    albuterol 90 mcg/actuation inhaler Inhale 2 puffs into the lungs every 6 (six) hours as needed for Wheezing. Rescue    albuterol-ipratropium (DUO-NEB) 2.5 mg-0.5 mg/3 mL nebulizer solution Take 3 mLs by nebulization every 6 (six) hours as needed for Wheezing or Shortness of Breath. Rescue    blood sugar diagnostic Strp 1 each by Misc.(Non-Drug; Combo Route) route 4 (four) times daily before meals and nightly. ACCU CHEK ELVIA PLUS METER    blood-glucose meter (ACCU-CHEK ELVIA PLUS METER) Misc TEST FOUR TIMES DAILY BEFORE MEALS AND EVERY NIGHT    budesonide-formoterol 160-4.5 mcg (SYMBICORT) 160-4.5 mcg/actuation HFAA Inhale 2 puffs into the lungs every 12 (twelve) hours. Controller    capsaicin 0.1 % Crea Apply 1 application topically 2 (two) times daily.    doxycycline  (VIBRAMYCIN) 100 MG Cap Take 1 capsule (100 mg total) by mouth every 12 (twelve) hours. for 14 days    fluticasone (FLONASE) 50 mcg/actuation nasal spray 1 spray (50 mcg total) by Each Nare route once daily.    GAVILYTE-G 236-22.74-6.74 -5.86 gram suspension     hydroCHLOROthiazide (HYDRODIURIL) 25 MG tablet Take 1 tablet (25 mg total) by mouth once daily.    hydrOXYzine (ATARAX) 50 MG tablet Take 1-4 at night for itching.    lancets (ACCU-CHEK SOFTCLIX LANCETS) Misc 1 Device by Misc.(Non-Drug; Combo Route) route 4 (four) times daily.    levocetirizine (XYZAL) 5 MG tablet Take 1 tablet (5 mg total) by mouth every evening.    linaclotide (LINZESS) 290 mcg Cap Take 1 capsule (290 mcg total) by mouth once daily.    losartan (COZAAR) 100 MG tablet TAKE 1 TABLET BY MOUTH EVERY DAY    medroxyPROGESTERone (PROVERA) 10 MG tablet Take 1 tablet (10 mg total) by mouth once daily. TAKE DAY 1-12 Q MONTH    metFORMIN (GLUCOPHAGE-XR) 500 MG 24 hr tablet Take 2 tablets (1,000 mg total) by mouth 2 (two) times daily with meals.    mometasone 0.1% (ELOCON) 0.1 % cream     montelukast (SINGULAIR) 10 mg tablet TAKE 1 TABLET(10 MG) BY MOUTH EVERY EVENING    ondansetron (ZOFRAN) 8 MG tablet Take 1 tablet (8 mg total) by mouth every 8 (eight) hours as needed for Nausea.    oxyCODONE-acetaminophen (PERCOCET)  mg per tablet TAKE 1 TABLET BY MOUTH EVERY 8 HOURS AS NEEDED. MAY CAUSE DROWSINESS    pantoprazole (PROTONIX) 40 MG tablet Take 1 tablet (40 mg total) by mouth 2 (two) times daily.    QUEtiapine (SEROQUEL) 25 MG Tab TAKE 1 TABLET(25 MG) BY MOUTH EVERY EVENING    sumatriptan (IMITREX) 50 MG tablet Take 1 tab at onset of headaches.  If no improvement in 2 hours, take another.  Do not take more than 2 tabs in 24 hours.    tiotropium (SPIRIVA) 18 mcg inhalation capsule Inhale 1 capsule (18 mcg total) into the lungs once daily. Controller    tiZANidine (ZANAFLEX) 4 MG tablet Take 4 mg by mouth every 8 (eight) hours.      topiramate (TOPAMAX) 100 MG tablet Take 3 tablets (300 mg total) by mouth 2 (two) times daily.    venlafaxine (EFFEXOR-XR) 75 MG 24 hr capsule TAKE 1 CAPSULE(75 MG) BY MOUTH EVERY EVENING    acetaZOLAMIDE (DIAMOX) 500 mg CpSR Take 1 capsule (500 mg total) by mouth 2 (two) times daily.    meloxicam (MOBIC) 15 MG tablet TAKE 1 TABLET(15 MG) BY MOUTH DAILY AS NEEDED FOR HEADACHE    naproxen (EC NAPROSYN) 500 MG EC tablet Take 1 tablet (500 mg total) by mouth 2 (two) times daily.    nicotine polacrilex 2 MG Lozg Take 1 lozenge (2 mg total) by mouth as needed.     Family History     Problem Relation (Age of Onset)    Breast cancer Maternal Grandmother    COPD Father    Cancer Maternal Grandmother    Colon cancer Mother (50), Maternal Grandmother    Colon polyps Mother, Maternal Grandmother    Diabetes Mother    Heart attack Father    Heart disease Father    Hypertension Mother, Father    Ulcers Father        Tobacco Use    Smoking status: Current Every Day Smoker     Packs/day: 1.00     Years: 15.00     Pack years: 15.00     Types: Cigarettes    Smokeless tobacco: Never Used    Tobacco comment: 1 pack every other day   Substance and Sexual Activity    Alcohol use: No     Alcohol/week: 0.0 oz    Drug use: No    Sexual activity: Yes     Partners: Male     Birth control/protection: None     Review of Systems   Constitutional: Negative for chills and fever.   HENT: Negative for nosebleeds and tinnitus.    Eyes: Negative for photophobia and visual disturbance.   Respiratory: Positive for cough, shortness of breath and wheezing.    Cardiovascular: Negative for chest pain, palpitations and leg swelling.   Gastrointestinal: Positive for nausea. Negative for abdominal distention, abdominal pain, diarrhea and vomiting.   Genitourinary: Negative for dysuria, flank pain and hematuria.   Musculoskeletal: Negative for gait problem and joint swelling.   Skin: Negative for rash and wound.   Neurological: Negative for  seizures and syncope.     Objective:     Vital Signs (Most Recent):  Temp: 98.7 °F (37.1 °C) (03/20/19 1412)  Pulse: 78 (03/20/19 1731)  Resp: 18 (03/20/19 1701)  BP: (!) 123/55 (03/20/19 1731)  SpO2: 100 % (03/20/19 1731) Vital Signs (24h Range):  Temp:  [98.7 °F (37.1 °C)] 98.7 °F (37.1 °C)  Pulse:  [] 78  Resp:  [18-30] 18  SpO2:  [96 %-100 %] 100 %  BP: (123-172)/(55-91) 123/55     Weight: (!) 159.2 kg (351 lb)  Body mass index is 60.25 kg/m².    Physical Exam   Constitutional: She is oriented to person, place, and time. She appears well-developed and well-nourished. No distress.   On supplemental oxygen  Morbidly obese   HENT:   Head: Normocephalic and atraumatic.   Eyes: Conjunctivae and EOM are normal. Right eye exhibits no discharge. Left eye exhibits no discharge.   Neck: Normal range of motion. No thyromegaly present.   Cardiovascular: Normal rate and regular rhythm.   No murmur heard.  Pulmonary/Chest: No respiratory distress. She has wheezes.   Poor air movement at bases. Expiratory wheezes in mid lung fields and upper lung fields  Speaking in short sentences  Increased work of breathing   Abdominal: Soft. Bowel sounds are normal. She exhibits no distension and no mass. There is no tenderness.   Musculoskeletal: She exhibits no edema or deformity.   Neurological: She is alert and oriented to person, place, and time.   Skin: Skin is warm and dry.   Psychiatric: She has a normal mood and affect. Her behavior is normal.   Nursing note and vitals reviewed.        CRANIAL NERVES     CN III, IV, VI   Extraocular motions are normal.        Significant Labs:   CBC:   Recent Labs   Lab 03/20/19  1413   WBC 3.80*   HGB 12.1   HCT 38.3        CMP:   Recent Labs   Lab 03/20/19  1413      K 3.5      CO2 25   *   BUN 9   CREATININE 1.3   CALCIUM 9.0   PROT 7.1   ALBUMIN 3.2*   BILITOT 0.4   ALKPHOS 79   AST 13   ALT 13   ANIONGAP 6*   EGFRNONAA 49*     Cardiac Markers:   Recent Labs    Lab 03/20/19  1413   BNP <10     Coagulation:   Recent Labs   Lab 03/20/19  1413   INR 1.0     Lactic Acid:   Recent Labs   Lab 03/20/19  1648   LACTATE 1.1     Troponin:   Recent Labs   Lab 03/20/19  1413   TROPONINI 0.007       Significant Imaging:   Imaging Results          X-Ray Chest AP Portable (Final result)  Result time 03/20/19 15:10:13    Final result by Bob Noe MD (03/20/19 15:10:13)                 Impression:      Findings suggestive of a minimal subsegmental atelectasis or a developing consolidation in the medial right base.      Electronically signed by: Bob Noe MD  Date:    03/20/2019  Time:    15:10             Narrative:    EXAMINATION:  XR CHEST AP PORTABLE    CLINICAL HISTORY:  Shortness of breath    TECHNIQUE:  Single frontal view of the chest was performed.    COMPARISON:  Chest 09/04/2018    FINDINGS:  Heart size within normal limits.  Mediastinum is unremarkable.  Slight increased coarse markings in the medial aspect of the right lung base, suspicious for a minimal right basilar atelectasis or a developing consolidation.  Rest of the right lung is clear.  Left lung is clear.  There is no pleural effusion.  The visualized bony thorax is within normal limits.

## 2019-03-20 NOTE — ASSESSMENT & PLAN NOTE
. Sliding scale insulin, hypoglycemic protocol, POCT glucose checks. Diabetic diet. Will monitor BG closely while on steroids for asthma.

## 2019-03-20 NOTE — ASSESSMENT & PLAN NOTE
Recently diagnosed with H. Pylori outpt. On doxy for treatment and flagyl for treatment started by outpt GI. Will continue as will provide coverage for possible PNA seen on chest x-ray as well.

## 2019-03-20 NOTE — ED PROVIDER NOTES
Encounter Date: 3/20/2019    This is a SORT/MSE of a 48 y.o. female presenting to the ED with c/o shortness of breath with cough. Patient with difficulty speaking due to SOB. Patient distressed on exam. Care will be transferred to an alternate provider when patient is roomed for a full evaluation and final disposition. BEENA Mcdaniel, FNP-C 3/20/2019 2:00 PM    SCRIBE #1 NOTE: I, Pili Chavez , am scribing for, and in the presence of,  [place] MD . I have scribed the following portions of the note - Other sections scribed: HPI, ROS, PE.       History     Chief Complaint   Patient presents with    Shortness of Breath     Pt. arrives to ED SOB and gasping for air, oxygen saturation 99% audible wheezes heard. Pt. directly roomed, respiratory notfied. Reports hx of asthma and SOB has gotten increasingly worse in last few days. Denies any n/v/d/     CC: Shortness of Breath    HPI: 49 y/o F who  has a past medical history of Allergy, Anemia, Asthma, Depression, Diabetes with neurologic complications, GERD (gastroesophageal reflux disease), High cholesterol, Hyperprolactinemia, Hypertension, Pseudotumor cerebri, Seizures, Simple endometrial hyperplasia (2018), Sleep apnea, and Smoker presents to the ED with acute onset of shortness of breath, cough, wheezing, congestion, and chills which began one day ago. Pt has had similar symptoms in the past and believes her symptoms are due to her asthma. Pt states she has had asthma for more than ten years. Pt denies any new cleaning material. No abnormal outside activity. No know allergies. Past surgeries include Caesarean Section and gall bladder removal. Pt did get her flu shot this year. Pt is a smoker. She denies alcohol or drug use. No fever/chills, nausea/emesis, abdominal/back pain, headache/dizziness, weakness/numbness, urinary symptoms, abnormal bowels.     11/2018  ECHO  · Normal left ventricular systolic function. The estimated ejection fraction is 60%  · Normal LV  diastolic function.  · Normal right ventricular systolic function.  · Trace mitral regurgitation.  · Moderate left atrial enlargement.    Stress test  · The perfusion scan is free of evidence from myocardial ischemia or injury.  · There is a mild intensity defect in the wall of the left ventricle, secondary to breast attenuation.  · An ejection fraction of 67 % at rest      The history is provided by the patient. No  was used.     Review of patient's allergies indicates:  No Known Allergies  Past Medical History:   Diagnosis Date    Allergy     Anemia     Asthma     Depression     Diabetes with neurologic complications     GERD (gastroesophageal reflux disease)     High cholesterol     Hyperprolactinemia     Hypertension     Pseudotumor cerebri     Seizures     Simple endometrial hyperplasia 2018    Sleep apnea     Smoker      Past Surgical History:   Procedure Laterality Date    BREAST CYST ASPIRATION       SECTION      X 1    CHOLECYSTECTOMY      COLONOSCOPY      COLONOSCOPY N/A 2019    Performed by Jagruti Sweeney MD at [facility]O (2ND FLR)    EGD (ESOPHAGOGASTRODUODENOSCOPY) N/A 2019    Performed by Jagruti [facility]weeney MD at [facility] ENDO (2ND FLR)    HYSTEROSCOPY, WITH DILATION AND CURETTAGE OF UTERUS N/A 10/16/2018    [facility] by Glenn Black Jr., [facility] at [facility] OR    SINUS SURGERY      UPPER GASTROINTESTINAL ENDOSCOPY       Family History   Problem Relation Age of Onset    Hypertension Mother     Diabetes Mother     Colon cancer Mother 50    Colon polyps Mother     Heart disease Father     COPD Father     Heart attack Father     Hypertension Father     Ulcers Father     Cancer Maternal Grandmother     Breast cancer Maternal Grandmother     Colon cancer Maternal Grandmother     Colon polyps Maternal Grandmother     Celiac disease Neg Hx     Cirrhosis Neg Hx     Crohn's disease Neg Hx     Cystic fibrosis Neg Hx     Esophageal cancer Neg Hx      Hemochromatosis Neg Hx     Inflammatory bowel disease Neg Hx     Irritable bowel syndrome Neg Hx     Liver cancer Neg Hx     Liver disease Neg Hx     Rectal cancer Neg Hx     Stomach cancer Neg Hx     Ulcerative colitis Neg Hx     Jonnie's disease Neg Hx     Tuberculosis Neg Hx     Lymphoma Neg Hx     Scleroderma Neg Hx     Rheum arthritis Neg Hx     Melanoma Neg Hx     Multiple sclerosis Neg Hx     Psoriasis Neg Hx     Lupus Neg Hx     Skin cancer Neg Hx      Social History     Tobacco Use    Smoking status: Current Every Day Smoker     Packs/day: 1.00     Years: 15.00     Pack years: 15.00     Types: Cigarettes    Smokeless tobacco: Never Used    Tobacco comment: 1 pack every other day   Substance Use Topics    Alcohol use: No     Alcohol/week: 0.0 oz    Drug use: No     Review of Systems   Constitutional: Positive for chills. Negative for appetite change and fever.   HENT: Positive for congestion. Negative for rhinorrhea and sore throat.    Eyes: Negative for visual disturbance.   Respiratory: Positive for cough, shortness of breath and wheezing.    Cardiovascular: Negative for chest pain.   Gastrointestinal: Negative for abdominal pain.   Genitourinary: Negative for dysuria.   Musculoskeletal: Negative for gait problem.   Skin: Negative for rash.   Neurological: Negative for syncope.       Physical Exam     Initial Vitals   BP Pulse Resp Temp SpO2   03/20/19 1409 03/20/19 1409 03/20/19 1412 03/20/19 1412 03/20/19 1409   (!) 172/91 82 (!) 30 98.7 °F (37.1 °C) 96 %      MAP       --                Physical Exam    Nursing note and vitals reviewed.  Constitutional: She is not diaphoretic. She is Obese .   Talking in three or four sentences.    HENT:   Head: Normocephalic and atraumatic.   Mouth/Throat: Oropharynx is clear and moist.   Eyes: EOM are normal. Pupils are equal, round, and reactive to light. No scleral icterus.   Neck: Normal range of motion. Neck supple. No JVD present.    Cardiovascular: Normal rate and regular rhythm.   Pulmonary/Chest: No stridor. She is in respiratory distress (Mild-Moderate). She has decreased breath sounds. She has wheezes (Bilateral ).   Abdominal: Soft. Bowel sounds are normal. She exhibits no distension. There is no tenderness.   Musculoskeletal: Normal range of motion. She exhibits no edema or tenderness.   No leg swellings.    Neurological: She is alert and oriented to person, place, and time. She has normal strength. No cranial nerve deficit or sensory deficit.   Skin: Skin is warm and dry.   Psychiatric: She has a normal mood and affect.         ED Course   Procedures  Labs Reviewed   CBC W/ AUTO DIFFERENTIAL - Abnormal; Notable for the following components:       Result Value    WBC 3.80 (*)     MCHC 31.6 (*)     RDW 16.0 (*)     MPV 9.1 (*)     All other components within normal limits   COMPREHENSIVE METABOLIC PANEL - Abnormal; Notable for the following components:    Glucose 119 (*)     Albumin 3.2 (*)     Anion Gap 6 (*)     eGFR if  57 (*)     eGFR if non  49 (*)     All other components within normal limits   CULTURE, BLOOD   CULTURE, BLOOD   TROPONIN I   B-TYPE NATRIURETIC PEPTIDE   PROTIME-INR   MAGNESIUM   LACTIC ACID, PLASMA   POCT URINE PREGNANCY     EKG Readings: (Independently Interpreted)   EKG done at 2:19 p.m. showing normal sinus rhythm rate of 77.  Flat T-waves diffusely.  Normal axis and QRS.  Compared to previous EKG and unchanged.  Nonspecific EKG.       Imaging Results          X-Ray Chest AP Portable (Final result)  Result time 03/20/19 15:10:13    Final result by Bob Noe MD (03/20/19 15:10:13)                 Impression:      Findings suggestive of a minimal subsegmental atelectasis or a developing consolidation in the medial right base.      Electronically signed by: Bob Noe MD  Date:    03/20/2019  Time:    15:10             Narrative:    EXAMINATION:  XR CHEST AP PORTABLE    CLINICAL  HISTORY:  Shortness of breath    TECHNIQUE:  Single frontal view of the chest was performed.    COMPARISON:  Chest 09/04/2018    FINDINGS:  Heart size within normal limits.  Mediastinum is unremarkable.  Slight increased coarse markings in the medial aspect of the right lung base, suspicious for a minimal right basilar atelectasis or a developing consolidation.  Rest of the right lung is clear.  Left lung is clear.  There is no pleural effusion.  The visualized bony thorax is within normal limits.                                 Medical Decision Making:   Initial Assessment:   48 year old patient presenting with sob and wheezing consistent with an asthma exacerbation.  Patient has no signs of AMS but is using accessory muscle use with high risk of respiratory compromise. Patient has received 10 mg of albuterol and 1 mg of ipratropium. Patient received 125 mg of steroids. Patient also received 2 mg of Magnesium. Considered bipap and will use if respiratory status worsens. Patient also received 500 mL of fluids while in the Emergency Department. Discussed patient with Will and admitted patient to Dr Alford service.     Also considered but less likely:     Pneumonia: no fever, unilateral ronchi, or signs concerning of pneumonia.  Chest x-ray either showed atelectasis or developing pneumonia.  I lean towards atelectasis but will cover patient with ceftriaxone and azithromycin.  COPD: no history of COPD but smoking hx. possible  ACS: presentation atypical and more consistent with asthma  Pneumothorax: bilateral breath sounds and wheezing bilaterally  CHF: no signs of fluid overload and no history    Still wheezing after continuous treatment so giving more breathing treatments.  Patient agreeable with admission.  Added on lactic acid and blood culture after seeing chest x-ray.  Do not believe patient is septic.    Please put in 35 minutes of critical care due to patient having a high risk of respiratory failure.    Separate from teaching and exclusive of procedure and ekg time  Includes:  Time at bedside  Time reviewing test results  Time discussing case with staff  Time documenting the medical record  Time spent with family members  Time spent with consults  Management    Patient understands and agrees with plan.  All questions answered.     Clinical Tests:   Lab Tests: Ordered and Reviewed  Radiological Study: Reviewed and Ordered  Medical Tests: Ordered and Reviewed            Scribe Attestation:   Scribe #1: I performed the above scribed service and the documentation accurately describes the services I performed. I attest to the accuracy of the note.    Attending Attestation:           Physician Attestation for Scribe:  Physician Attestation Statement for Scribe #1: I, [place] MD , reviewed documentation, as scribed by Pili Chavez  in my presence, and it is both accurate and complete.                    Clinical Impression:       ICD-10-CM ICD-9-CM   1. Exacerbation of asthma, unspecified asthma severity, unspecified whether persistent J45.901 493.92   2. Shortness of breath R06.02 786.05   3. Tachypnea R06.82 786.06                                [place] MD  03/20/19 1616

## 2019-03-20 NOTE — HPI
Yanni Kaiser 48 y.o. age female with idiopathic intracranial hypertension, HTN, DM2, asthma, CKD, and depression presents to the hospital with a chief complaint of SOB. She began to develop SOB yesterday, but reports it progressively worsened to the point where she has difficulty ambulating in the room. She is not on home O2. She reports her trigger for asthma is when she becomes overheated. She has grandchildren who have been sick recently. She has no pets, but does have carpet at home. She reports this is similar in presentation to prior asthma exacerbations. She reports her symptoms have improved somewhat now. She finds she is able to talk some now. Patient endorses cough, SOB, wheezing, and nausea. She denies fever, chest pain, abdominal pain, dysuria, syncope.     In the ED, chest x-ray with concern for possible developing consolidation, still with increased work of breathing after steroids, magnesium, and breathing treatment, troponin negative, and EKG of NSR.

## 2019-03-20 NOTE — HOSPITAL COURSE
Yanni Kaiser 48 y.o. female admitted to observation for asthma. In the ED, chest x-ray with concern for possible developing consolidation, still with increased work of breathing after steroids, magnesium, and breathing treatment, troponin negative, and EKG of NSR.  Symptoms significantly improved over night and patient ready for discharge home.  O2 saturation remains 94-90% on room air.  Patient felt breathing at baseline. Patient stable for discharged home on prednisone and DuoNeb.

## 2019-03-20 NOTE — ED TRIAGE NOTES
Pt arrived via personal transportation from home. CC of SOB. Pt reports that she has hx of asthma and began to feel bad a few days ago. Pt reports that she is unable to speak without feeling SOB. Pt also reports that she was not able to get  Relief with home treatments.

## 2019-03-20 NOTE — ASSESSMENT & PLAN NOTE
No lipid panel on file. No statin on home medication list. Patient unsure of what she takes at home. Lipid panel pending. Will start statin when panel results.

## 2019-03-20 NOTE — PROGRESS NOTES
Pt states she is post menopausal and there is no chance of pregnancy. Pt was shielded for exam. -LGF

## 2019-03-20 NOTE — ASSESSMENT & PLAN NOTE
Greater than 3 minutes spent counseling patient on dangers of continued tobacco use. Will provide prn nicotine patch.

## 2019-03-21 ENCOUNTER — PATIENT MESSAGE (OUTPATIENT)
Dept: OPTOMETRY | Facility: CLINIC | Age: 49
End: 2019-03-21

## 2019-03-21 ENCOUNTER — NURSE TRIAGE (OUTPATIENT)
Dept: ADMINISTRATIVE | Facility: CLINIC | Age: 49
End: 2019-03-21

## 2019-03-21 VITALS
WEIGHT: 293 LBS | OXYGEN SATURATION: 96 % | RESPIRATION RATE: 18 BRPM | SYSTOLIC BLOOD PRESSURE: 169 MMHG | BODY MASS INDEX: 50.02 KG/M2 | HEART RATE: 85 BPM | DIASTOLIC BLOOD PRESSURE: 89 MMHG | HEIGHT: 64 IN | TEMPERATURE: 98 F

## 2019-03-21 DIAGNOSIS — J45.50 SEVERE PERSISTENT ASTHMA, UNSPECIFIED WHETHER COMPLICATED: ICD-10-CM

## 2019-03-21 LAB
ALBUMIN SERPL BCP-MCNC: 3.1 G/DL
ALP SERPL-CCNC: 80 U/L
ALT SERPL W/O P-5'-P-CCNC: 13 U/L
ANION GAP SERPL CALC-SCNC: 8 MMOL/L
AST SERPL-CCNC: 9 U/L
BASOPHILS # BLD AUTO: 0.01 K/UL
BASOPHILS NFR BLD: 0.2 %
BILIRUB SERPL-MCNC: 0.2 MG/DL
BUN SERPL-MCNC: 10 MG/DL
CALCIUM SERPL-MCNC: 9 MG/DL
CHLORIDE SERPL-SCNC: 109 MMOL/L
CO2 SERPL-SCNC: 22 MMOL/L
CREAT SERPL-MCNC: 1.2 MG/DL
DIFFERENTIAL METHOD: ABNORMAL
EOSINOPHIL # BLD AUTO: 0 K/UL
EOSINOPHIL NFR BLD: 0 %
ERYTHROCYTE [DISTWIDTH] IN BLOOD BY AUTOMATED COUNT: 16.2 %
EST. GFR  (AFRICAN AMERICAN): >60 ML/MIN/1.73 M^2
EST. GFR  (NON AFRICAN AMERICAN): 54 ML/MIN/1.73 M^2
ESTIMATED AVG GLUCOSE: 140 MG/DL
GLUCOSE SERPL-MCNC: 289 MG/DL
HBA1C MFR BLD HPLC: 6.5 %
HCT VFR BLD AUTO: 38.2 %
HGB BLD-MCNC: 11.8 G/DL
LYMPHOCYTES # BLD AUTO: 0.6 K/UL
LYMPHOCYTES NFR BLD: 11.4 %
MCH RBC QN AUTO: 27.3 PG
MCHC RBC AUTO-ENTMCNC: 30.9 G/DL
MCV RBC AUTO: 88 FL
MONOCYTES # BLD AUTO: 0.1 K/UL
MONOCYTES NFR BLD: 1.7 %
NEUTROPHILS # BLD AUTO: 4.5 K/UL
NEUTROPHILS NFR BLD: 86.7 %
PLATELET # BLD AUTO: 305 K/UL
PMV BLD AUTO: 9.2 FL
POCT GLUCOSE: 263 MG/DL (ref 70–110)
POCT GLUCOSE: 285 MG/DL (ref 70–110)
POTASSIUM SERPL-SCNC: 4.2 MMOL/L
PROT SERPL-MCNC: 7.1 G/DL
RBC # BLD AUTO: 4.33 M/UL
SODIUM SERPL-SCNC: 139 MMOL/L
TROPONIN I SERPL DL<=0.01 NG/ML-MCNC: <0.006 NG/ML
WBC # BLD AUTO: 5.19 K/UL

## 2019-03-21 PROCEDURE — 25000003 PHARM REV CODE 250: Mod: HCNC | Performed by: EMERGENCY MEDICINE

## 2019-03-21 PROCEDURE — 36415 COLL VENOUS BLD VENIPUNCTURE: CPT | Mod: HCNC

## 2019-03-21 PROCEDURE — 25000242 PHARM REV CODE 250 ALT 637 W/ HCPCS: Mod: HCNC | Performed by: EMERGENCY MEDICINE

## 2019-03-21 PROCEDURE — 94760 N-INVAS EAR/PLS OXIMETRY 1: CPT | Mod: HCNC

## 2019-03-21 PROCEDURE — 94640 AIRWAY INHALATION TREATMENT: CPT | Mod: HCNC

## 2019-03-21 PROCEDURE — 63600175 PHARM REV CODE 636 W HCPCS: Mod: HCNC | Performed by: PHYSICIAN ASSISTANT

## 2019-03-21 PROCEDURE — S4991 NICOTINE PATCH NONLEGEND: HCPCS | Mod: HCNC | Performed by: PHYSICIAN ASSISTANT

## 2019-03-21 PROCEDURE — 96376 TX/PRO/DX INJ SAME DRUG ADON: CPT

## 2019-03-21 PROCEDURE — G0378 HOSPITAL OBSERVATION PER HR: HCPCS | Mod: HCNC

## 2019-03-21 PROCEDURE — 25000003 PHARM REV CODE 250: Mod: HCNC | Performed by: PHYSICIAN ASSISTANT

## 2019-03-21 PROCEDURE — 80053 COMPREHEN METABOLIC PANEL: CPT | Mod: HCNC

## 2019-03-21 PROCEDURE — 25000003 PHARM REV CODE 250: Mod: HCNC | Performed by: INTERNAL MEDICINE

## 2019-03-21 PROCEDURE — 85025 COMPLETE CBC W/AUTO DIFF WBC: CPT | Mod: HCNC

## 2019-03-21 RX ORDER — METRONIDAZOLE 500 MG/1
500 TABLET ORAL 4 TIMES DAILY
Start: 2019-03-21 | End: 2019-08-19 | Stop reason: ALTCHOICE

## 2019-03-21 RX ORDER — PREDNISONE 20 MG/1
40 TABLET ORAL DAILY
Qty: 10 TABLET | Refills: 0 | Status: SHIPPED | OUTPATIENT
Start: 2019-03-21 | End: 2019-03-26

## 2019-03-21 RX ORDER — TRAMADOL HYDROCHLORIDE 50 MG/1
50 TABLET ORAL EVERY 6 HOURS PRN
Status: DISCONTINUED | OUTPATIENT
Start: 2019-03-21 | End: 2019-03-21 | Stop reason: HOSPADM

## 2019-03-21 RX ADMIN — NICOTINE 1 PATCH: 21 PATCH, EXTENDED RELEASE TRANSDERMAL at 08:03

## 2019-03-21 RX ADMIN — IPRATROPIUM BROMIDE AND ALBUTEROL SULFATE 3 ML: .5; 3 SOLUTION RESPIRATORY (INHALATION) at 07:03

## 2019-03-21 RX ADMIN — VENLAFAXINE HYDROCHLORIDE 75 MG: 75 CAPSULE, EXTENDED RELEASE ORAL at 08:03

## 2019-03-21 RX ADMIN — ACETAZOLAMIDE 500 MG: 500 CAPSULE, EXTENDED RELEASE ORAL at 08:03

## 2019-03-21 RX ADMIN — FLUTICASONE PROPIONATE 50 MCG: 50 SPRAY, METERED NASAL at 08:03

## 2019-03-21 RX ADMIN — TRAMADOL HYDROCHLORIDE 50 MG: 50 TABLET, FILM COATED ORAL at 12:03

## 2019-03-21 RX ADMIN — IPRATROPIUM BROMIDE AND ALBUTEROL SULFATE 3 ML: .5; 3 SOLUTION RESPIRATORY (INHALATION) at 11:03

## 2019-03-21 RX ADMIN — HYDROCHLOROTHIAZIDE 25 MG: 25 TABLET ORAL at 08:03

## 2019-03-21 RX ADMIN — METRONIDAZOLE 500 MG: 500 TABLET ORAL at 08:03

## 2019-03-21 RX ADMIN — DOXYCYCLINE HYCLATE 100 MG: 100 TABLET, COATED ORAL at 08:03

## 2019-03-21 RX ADMIN — LOSARTAN POTASSIUM 100 MG: 25 TABLET, FILM COATED ORAL at 08:03

## 2019-03-21 RX ADMIN — PANTOPRAZOLE SODIUM 40 MG: 40 TABLET, DELAYED RELEASE ORAL at 08:03

## 2019-03-21 RX ADMIN — TOPIRAMATE 300 MG: 100 TABLET, FILM COATED ORAL at 08:03

## 2019-03-21 RX ADMIN — METHYLPREDNISOLONE SODIUM SUCCINATE 40 MG: 125 INJECTION, POWDER, FOR SOLUTION INTRAMUSCULAR; INTRAVENOUS at 06:03

## 2019-03-21 NOTE — ASSESSMENT & PLAN NOTE
Patient reports symptoms similar to previous exacerbations. Improved with steroids, magnesium, and duo-nebs in ED. Still with increased work of breathing.  Continue steroids, duo-nebs. PRN oxygen.  Will continue home doxy used for H. Pylori outpt. Will cover for possible pneumonia seen on chest x-ray

## 2019-03-21 NOTE — H&P
[facility] Medicine  History & Physical    Patient Name: Yanni Kaiser  MRN: [id number]  Admission Date: 3/20/2019  Attending Physician: Wei Alford MD   Primary Care Provider: Carlyle Gordillo MD         Patient information was obtained from patient, past medical records and ER records.     Subjective:     Principal Problem:Exacerbation of asthma    Chief Complaint:   Chief Complaint   Patient presents with    Shortness of Breath     Pt. arrives to ED SOB and gasping for air, oxygen saturation 99% audible wheezes heard. Pt. directly roomed, respiratory notfied. Reports hx of asthma and SOB has gotten increasingly worse in last few days. Denies any n/v/d/        HPI: Yanni Kaiser 48 y.o. age female with idiopathic intracranial hypertension, HTN, DM2, asthma, CKD, and depression presents to the hospital with a chief complaint of SOB. She began to develop SOB yesterday, but reports it progressively worsened to the point where she has difficulty ambulating in the room. She is not on home O2. She reports her trigger for asthma is when she becomes overheated. She has grandchildren who have been sick recently. She has no pets, but does have carpet at home. She reports this is similar in presentation to prior asthma exacerbations. She reports her symptoms have improved somewhat now. She finds she is able to talk some now. Patient endorses cough, SOB, wheezing, and nausea. She denies fever, chest pain, abdominal pain, dysuria, syncope.     In the ED, chest x-ray with concern for possible developing consolidation, still with increased work of breathing after steroids, magnesium, and breathing treatment, troponin negative, and EKG of NSR.     Past Medical History:   Diagnosis Date    Allergy     Anemia     Asthma     Cigarette smoker     Depression     Diabetes with neurologic complications     GERD (gastroesophageal reflux disease)     High cholesterol      Hyperprolactinemia     Hypertension     Obese body habitus     Pseudotumor cerebri     Seizures     Simple endometrial hyperplasia 2018    Sleep apnea     Smoker        Past Surgical History:   Procedure Laterality Date    BREAST CYST ASPIRATION       SECTION      X 1    CHOLECYSTECTOMY      COLONOSCOPY      COLONOSCOPY N/A 2019    Performed by Jagruti Sweeney MD at [facility] (2ND FLR)    EGD (ESOPHAGOGASTRODUODENOSCOPY) N/A 2019    Performed by Jagruti Sweeney MD at [facility] (2ND FLR)    HYSTEROSCOPY, WITH DILATION AND CURETTAGE OF UTERUS N/A 10/16/2018    Per[facility] by Glenn Black Jr., [facility] at [facility] OR    SINUS SURGERY      UPPER GASTROINTESTINAL ENDOSCOPY         Review of patient's allergies indicates:  No Known Allergies    No current facility-administered medications on file prior to encounter.      Current Outpatient Medications on File Prior to Encounter   Medication Sig    albuterol 90 mcg/actuation inhaler Inhale 2 puffs into the lungs every 6 (six) hours as needed for Wheezing. Rescue    albuterol-ipratropium (DUO-NEB) 2.5 mg-0.5 mg/3 mL nebulizer solution Take 3 mLs by nebulization every 6 (six) hours as needed for Wheezing or Shortness of Breath. Rescue    blood sugar diagnostic Strp 1 each by Misc.(Non-Drug; Combo Route) route 4 (four) times daily before meals and nightly. ACCU CHEK ELVIA PLUS METER    blood-glucose meter (ACCU-CHEK ELVIA PLUS METER) Misc TEST FOUR TIMES DAILY BEFORE MEALS AND EVERY NIGHT    budesonide-formoterol 160-4.5 mcg (SYMBICORT) 160-4.5 mcg/actuation HFAA Inhale 2 puffs into the lungs every 12 (twelve) hours. Controller    capsaicin 0.1 % Crea Apply 1 application topically 2 (two) times daily.    doxycycline (VIBRAMYCIN) 100 MG Cap Take 1 capsule (100 mg total) by mouth every 12 (twelve) hours. for 14 days    fluticasone (FLONASE) 50 mcg/actuation nasal spray 1 spray (50 mcg total) by Each Nare route once daily.    GAVILYTE-G  236-22.74-6.74 -5.86 gram suspension     hydroCHLOROthiazide (HYDRODIURIL) 25 MG tablet Take 1 tablet (25 mg total) by mouth once daily.    hydrOXYzine (ATARAX) 50 MG tablet Take 1-4 at night for itching.    lancets (ACCU-CHEK SOFTCLIX LANCETS) Misc 1 Device by Misc.(Non-Drug; Combo Route) route 4 (four) times daily.    levocetirizine (XYZAL) 5 MG tablet Take 1 tablet (5 mg total) by mouth every evening.    linaclotide (LINZESS) 290 mcg Cap Take 1 capsule (290 mcg total) by mouth once daily.    losartan (COZAAR) 100 MG tablet TAKE 1 TABLET BY MOUTH EVERY DAY    medroxyPROGESTERone (PROVERA) 10 MG tablet Take 1 tablet (10 mg total) by mouth once daily. TAKE DAY 1-12 Q MONTH    metFORMIN (GLUCOPHAGE-XR) 500 MG 24 hr tablet Take 2 tablets (1,000 mg total) by mouth 2 (two) times daily with meals.    mometasone 0.1% (ELOCON) 0.1 % cream     montelukast (SINGULAIR) 10 mg tablet TAKE 1 TABLET(10 MG) BY MOUTH EVERY EVENING    ondansetron (ZOFRAN) 8 MG tablet Take 1 tablet (8 mg total) by mouth every 8 (eight) hours as needed for Nausea.    oxyCODONE-acetaminophen (PERCOCET)  mg per tablet TAKE 1 TABLET BY MOUTH EVERY 8 HOURS AS NEEDED. MAY CAUSE DROWSINESS    pantoprazole (PROTONIX) 40 MG tablet Take 1 tablet (40 mg total) by mouth 2 (two) times daily.    QUEtiapine (SEROQUEL) 25 MG Tab TAKE 1 TABLET(25 MG) BY MOUTH EVERY EVENING    sumatriptan (IMITREX) 50 MG tablet Take 1 tab at onset of headaches.  If no improvement in 2 hours, take another.  Do not take more than 2 tabs in 24 hours.    tiotropium (SPIRIVA) 18 mcg inhalation capsule Inhale 1 capsule (18 mcg total) into the lungs once daily. Controller    tiZANidine (ZANAFLEX) 4 MG tablet Take 4 mg by mouth every 8 (eight) hours.     topiramate (TOPAMAX) 100 MG tablet Take 3 tablets (300 mg total) by mouth 2 (two) times daily.    venlafaxine (EFFEXOR-XR) 75 MG 24 hr capsule TAKE 1 CAPSULE(75 MG) BY MOUTH EVERY EVENING    acetaZOLAMIDE (DIAMOX)  500 mg CpSR Take 1 capsule (500 mg total) by mouth 2 (two) times daily.    meloxicam (MOBIC) 15 MG tablet TAKE 1 TABLET(15 MG) BY MOUTH DAILY AS NEEDED FOR HEADACHE    naproxen (EC NAPROSYN) 500 MG EC tablet Take 1 tablet (500 mg total) by mouth 2 (two) times daily.    nicotine polacrilex 2 MG Lozg Take 1 lozenge (2 mg total) by mouth as needed.     Family History     Problem Relation (Age of Onset)    Breast cancer Maternal Grandmother    COPD Father    Cancer Maternal Grandmother    Colon cancer Mother (50), Maternal Grandmother    Colon polyps Mother, Maternal Grandmother    Diabetes Mother    Heart attack Father    Heart disease Father    Hypertension Mother, Father    Ulcers Father        Tobacco Use    Smoking status: Current Every Day Smoker     Packs/day: 1.00     Years: 15.00     Pack years: 15.00     Types: Cigarettes    Smokeless tobacco: Never Used    Tobacco comment: 1 pack every other day   Substance and Sexual Activity    Alcohol use: No     Alcohol/week: 0.0 oz    Drug use: No    Sexual activity: Yes     Partners: Male     Birth control/protection: None     Review of Systems   Constitutional: Negative for chills and fever.   HENT: Negative for nosebleeds and tinnitus.    Eyes: Negative for photophobia and visual disturbance.   Respiratory: Positive for cough, shortness of breath and wheezing.    Cardiovascular: Negative for chest pain, palpitations and leg swelling.   Gastrointestinal: Positive for nausea. Negative for abdominal distention, abdominal pain, diarrhea and vomiting.   Genitourinary: Negative for dysuria, flank pain and hematuria.   Musculoskeletal: Negative for gait problem and joint swelling.   Skin: Negative for rash and wound.   Neurological: Negative for seizures and syncope.     Objective:     Vital Signs (Most Recent):  Temp: 98.7 °F (37.1 °C) (03/20/19 1412)  Pulse: 78 (03/20/19 1731)  Resp: 18 (03/20/19 1701)  BP: (!) 123/55 (03/20/19 1731)  SpO2: 100 % (03/20/19 1731)  Vital Signs (24h Range):  Temp:  [98.7 °F (37.1 °C)] 98.7 °F (37.1 °C)  Pulse:  [] 78  Resp:  [18-30] 18  SpO2:  [96 %-100 %] 100 %  BP: (123-172)/(55-91) 123/55     Weight: (!) 159.2 kg (351 lb)  Body mass index is 60.25 kg/m².    Physical Exam   Constitutional: She is oriented to person, place, and time. She appears well-developed and well-nourished. No distress.   On supplemental oxygen  Morbidly obese   HENT:   Head: Normocephalic and atraumatic.   Eyes: Conjunctivae and EOM are normal. Right eye exhibits no discharge. Left eye exhibits no discharge.   Neck: Normal range of motion. No thyromegaly present.   Cardiovascular: Normal rate and regular rhythm.   No murmur heard.  Pulmonary/Chest: No respiratory distress. She has wheezes.   Poor air movement at bases. Expiratory wheezes in mid lung fields and upper lung fields  Speaking in short sentences  Increased work of breathing   Abdominal: Soft. Bowel sounds are normal. She exhibits no distension and no mass. There is no tenderness.   Musculoskeletal: She exhibits no edema or deformity.   Neurological: She is alert and oriented to person, place, and time.   Skin: Skin is warm and dry.   Psychiatric: She has a normal mood and affect. Her behavior is normal.   Nursing note and vitals reviewed.        CRANIAL NERVES     CN III, IV, VI   Extraocular motions are normal.        Significant Labs:   CBC:   Recent Labs   Lab 03/20/19  1413   WBC 3.80*   HGB 12.1   HCT 38.3        CMP:   Recent Labs   Lab 03/20/19  1413      K 3.5      CO2 25   *   BUN 9   CREATININE 1.3   CALCIUM 9.0   PROT 7.1   ALBUMIN 3.2*   BILITOT 0.4   ALKPHOS 79   AST 13   ALT 13   ANIONGAP 6*   EGFRNONAA 49*     Cardiac Markers:   Recent Labs   Lab 03/20/19  1413   BNP <10     Coagulation:   Recent Labs   Lab 03/20/19  1413   INR 1.0     Lactic Acid:   Recent Labs   Lab 03/20/19  1648   LACTATE 1.1     Troponin:   Recent Labs   Lab 03/20/19  1413   TROPONINI 0.007        Significant Imaging:   Imaging Results          X-Ray Chest AP Portable (Final result)  Result time 03/20/19 15:10:13    Final result by Bob Noe MD (03/20/19 15:10:13)                 Impression:      Findings suggestive of a minimal subsegmental atelectasis or a developing consolidation in the medial right base.      Electronically signed by: Bob Noe MD  Date:    03/20/2019  Time:    15:10             Narrative:    EXAMINATION:  XR CHEST AP PORTABLE    CLINICAL HISTORY:  Shortness of breath    TECHNIQUE:  Single frontal view of the chest was performed.    COMPARISON:  Chest 09/04/2018    FINDINGS:  Heart size within normal limits.  Mediastinum is unremarkable.  Slight increased coarse markings in the medial aspect of the right lung base, suspicious for a minimal right basilar atelectasis or a developing consolidation.  Rest of the right lung is clear.  Left lung is clear.  There is no pleural effusion.  The visualized bony thorax is within normal limits.                                  Assessment/Plan:     * Exacerbation of asthma    Patient reports symptoms similar to previous exacerbations. Improved with steroids, magnesium, and duo-nebs in ED. Still with increased work of breathing.  Continue steroids, duo-nebs. PRN oxygen.  Will continue home doxy used for H. Pylori outpt. Will cover for possible pneumonia seen on chest x-ray     Type 2 diabetes mellitus with stage 3 chronic kidney disease, with long-term current use of insulin    . Sliding scale insulin, hypoglycemic protocol, POCT glucose checks. Diabetic diet. Will monitor BG closely while on steroids for asthma.     Epilepsy    Continue home topamax       GERD (gastroesophageal reflux disease)    Recently diagnosed with H. Pylori outpt. On doxy for treatment and flagyl for treatment started by outpt GI. Will continue as will provide coverage for possible PNA seen on chest x-ray as well.     Hyperlipidemia    No lipid panel on file.  No statin on home medication list. Patient unsure of what she takes at home. Lipid panel pending. Will start statin when panel results.       Depression    Continue home effexor and seroquel     Essential hypertension    Well controlled continue home losartan and hctz       Idiopathic intracranial hypertension    Continue home Acetazolamide       Tobacco use disorder, moderate, dependence    Greater than 3 minutes spent counseling patient on dangers of continued tobacco use. Will provide prn nicotine patch.       SHARATH (obstructive sleep apnea)    Not on cpap at home due to inability to tolerate mask. Declines tonight. Wishes to use nasal cannula         VTE Risk Mitigation (From admission, onward)        Ordered     IP VTE HIGH RISK PATIENT  Once      03/20/19 1816     Place sequential compression device  Until discontinued      03/20/19 1816     Place DANA hose  Until discontinued      03/20/19 1816         VTE: DANA/SCD  Code: full  Diet: diabetic  Dispo: pending improvement in respiratory status    Will Monsalve PA-C  Department of Hospital Medicine   [facility]

## 2019-03-21 NOTE — PROGRESS NOTES
[facility]    WRITTEN HEALTHCARE AND DISCHARGE INFORMATION    Follow-up Information     Carlyle Gordillo MD On 3/28/2019.    Specialties:  Family Medicine, Wound Care  Why:  @9:00am, for Hospital Follow up  Contact information:  [place] [address]  [phone]                     Help at Home           [phone]  After discharge for assistance [facility] On Call Nurse Care Line 24/7  Assistance     Things You are responsible For To Manage Your Care At Home:  1.    Getting your prescriptions filled   2.    Taking your medications as directed, DO NOT MISS ANY DOSES!  3.    Going to your follow-up doctor appointment. This is important because it  allow the doctor to monitor your progress and determine if  any changes need to made to your treatment plan.     Thank you for choosing [facility] for your care.  Please answer any calls you may receive from [facility] we want to continue to support you as you manage your healthcare needs. [facility] is happy to have the opportunity to serve you.      Sincerely,  Your [facility] Team,  JOSH Gould, ACM-RN; [facility]  [phone]

## 2019-03-21 NOTE — PLAN OF CARE
"   03/21/19 1140   Post-Acute Status   Post-Acute Authorization Other   Other Status No Post-Acute Service Needs   NurseAntohny notified that all CM needs are met    EDUCATION:  Ms Kaiser provided with educational information on GI.  Information reviewed and placed in :My Healthcare Packet" to be brought home for her to use as resource after discharge.  Information included:  signs and symptoms to look for and call the doctor if experiencing, and symptoms that may indicate a medical emergency: CALL 911.      All questions answered.  Teach back method used.    Patient stated, "I will come back if I bleed again from the rectum or vomit blood".        "

## 2019-03-21 NOTE — PLAN OF CARE
To patient's room to discuss patient managing her care at home.      TN Role Explained.  Patient identified by using 2 identifiers:  Name and date of birth    Patient stated that her  WILL HELP AT HOME WITH her RECOVERY.      TN name and contact info placed on the communication board    Preferred Pharmacy:  [id number] - [place] - [address]  [place] [address]  Phone: [phone] Fax: [phone]    [facility] - [place], [place] - [address]  [address] [address]  Phone: [phone] Fax: [phone]    [facility] - [place] - [address]  [address]  [place] [address]  Phone: [phone] Fax: [phone]    [facility] [place] - [place] - [address]  [address]  [place] [address]  Phone: [phone] Fax: [phone]       03/21/19 1100   Discharge Assessment   Assessment Type Discharge Planning Assessment   Confirmed/corrected address and phone number on facesheet? Yes   Assessment information obtained from? Patient   Expected Length of Stay (days) 2   Communicated expected length of stay with patient/caregiver yes   Prior to hospitilization cognitive status: Alert/Oriented   Prior to hospitalization functional status: Independent   Current cognitive status: Alert/Oriented   Current Functional Status: Independent   Lives With spouse   Able to Return to Prior Arrangements yes   Is patient able to care for self after discharge? Yes   Patient's perception of discharge disposition home or selfcare   Readmission Within the Last 30 Days no previous admission in last 30 days   Patient currently being followed by outpatient case management? No   Patient currently receives any other outside agency services? No   Equipment Currently Used at Home nebulizer;glucometer   Do you have any problems affording any of your prescribed  medications? No   Is the patient taking medications as prescribed? yes   Does the patient have transportation home? Yes   Transportation Anticipated family or friend will provide   Does the patient receive services at the [facility]? No   Discharge Plan A Home with family   DME Needed Upon Discharge  none   Patient/Family in Agreement with Plan yes

## 2019-03-21 NOTE — PLAN OF CARE
03/21/19 1808   Final Note   Assessment Type Final Discharge Note   Anticipated Discharge Disposition Home   What phone number can be called within the next 1-3 days to see how you are doing after discharge? ([phone])   Hospital Follow Up  Appt(s) scheduled? Yes   Discharge plans and expectations educations in teach back method with documentation complete? Yes   Right Care Referral Info   Post Acute Recommendation No Care

## 2019-03-21 NOTE — ASSESSMENT & PLAN NOTE
Not on cpap at home due to inability to tolerate mask. Declines tonight. Wishes to use nasal cannula

## 2019-03-22 DIAGNOSIS — J45.50 SEVERE PERSISTENT ASTHMA, UNSPECIFIED WHETHER COMPLICATED: ICD-10-CM

## 2019-03-22 RX ORDER — BUDESONIDE AND FORMOTEROL FUMARATE DIHYDRATE 160; 4.5 UG/1; UG/1
2 AEROSOL RESPIRATORY (INHALATION) EVERY 12 HOURS
Qty: 1 INHALER | Refills: 5 | Status: SHIPPED | OUTPATIENT
Start: 2019-03-22 | End: 2021-03-05 | Stop reason: SDUPTHER

## 2019-03-22 RX ORDER — ALBUTEROL SULFATE 90 UG/1
2 AEROSOL, METERED RESPIRATORY (INHALATION) EVERY 6 HOURS PRN
Qty: 1 INHALER | Refills: 0 | Status: SHIPPED | OUTPATIENT
Start: 2019-03-22 | End: 2019-03-22 | Stop reason: SDUPTHER

## 2019-03-22 RX ORDER — ALBUTEROL SULFATE 90 UG/1
AEROSOL, METERED RESPIRATORY (INHALATION)
Qty: 54 G | Refills: 0 | Status: SHIPPED | OUTPATIENT
Start: 2019-03-22 | End: 2019-10-28 | Stop reason: SDUPTHER

## 2019-03-22 NOTE — DISCHARGE SUMMARY
[facility] Hospital Medicine  Discharge Summary      Patient Name: Yanni Kaiser  MRN: [id number]  Admission Date: 3/20/2019  Hospital Length of Stay: 0 days  Discharge Date and Time:  03/21/2019 7:26 PM  Attending Physician: No att. providers found   Discharging Provider: Kiesha Dwyer NP  Primary Care Provider: Carlyle Gordillo MD      HPI:   Yanni Kaiser 48 y.o. age female with idiopathic intracranial hypertension, HTN, DM2, asthma, CKD, and depression presents to the hospital with a chief complaint of SOB. She began to develop SOB yesterday, but reports it progressively worsened to the point where she has difficulty ambulating in the room. She is not on home O2. She reports her trigger for asthma is when she becomes overheated. She has grandchildren who have been sick recently. She has no pets, but does have carpet at home. She reports this is similar in presentation to prior asthma exacerbations. She reports her symptoms have improved somewhat now. She finds she is able to talk some now. Patient endorses cough, SOB, wheezing, and nausea. She denies fever, chest pain, abdominal pain, dysuria, syncope.     In the ED, chest x-ray with concern for possible developing consolidation, still with increased work of breathing after steroids, magnesium, and breathing treatment, troponin negative, and EKG of NSR.     * No surgery found *      Hospital Course:   Yanni Kaiser 48 y.o. female admitted to observation for asthma. In the ED, chest x-ray with concern for possible developing consolidation, still with increased work of breathing after steroids, magnesium, and breathing treatment, troponin negative, and EKG of NSR.  Symptoms significantly improved over night and patient ready for discharge home.  O2 saturation remains 94-90% on room air.  Patient felt breathing at baseline. Patient stable for discharged home on prednisone 40 mg x5 days and continue home DuoNeb and  Symbicort.      Consults:     No new Assessment & Plan notes have been filed under this hospital service since the last note was generated.  Service: Hospital Medicine    Final Active Diagnoses:    Diagnosis Date Noted POA    PRINCIPAL PROBLEM:  Exacerbation of asthma [J45.901] 06/09/2015 Yes    Type 2 diabetes mellitus with stage 3 chronic kidney disease, with long-term current use of insulin [E11.22, N18.3, Z79.4] 03/08/2017 Not Applicable    GERD (gastroesophageal reflux disease) [K21.9] 07/30/2016 Yes     Chronic    Epilepsy [G40.909] 07/30/2016 Yes     Chronic    Hyperlipidemia [E78.5] 07/30/2016 Yes     Chronic    Depression [F32.9] 04/29/2016 Yes    Essential hypertension [I10] 02/04/2016 Yes     Chronic    Idiopathic intracranial hypertension [G93.2] 02/04/2016 Yes     Chronic    Tobacco use disorder, moderate, dependence [F17.200] 09/03/2014 Yes    SHARATH (obstructive sleep apnea) [G47.33] 03/27/2014 Yes      Problems Resolved During this Admission:       Discharged Condition: good    Disposition: Home or Self Care    Follow Up:  Follow-up Information     Carlyle Gordillo MD On 3/28/2019.    Specialties:  Family Medicine, Wound Care  Why:  @9:00am, for Hospital Follow up  Contact information:  [address]  [place] [address] [phone]                 Patient Instructions:      Notify your health care provider if you experience any of the following:  temperature >100.4     Notify your health care provider if you experience any of the following:  difficulty breathing or increased cough     Activity as tolerated         Pending Diagnostic Studies:     None         Medications:  Reconciled Home Medications:      Medication List      START taking these medications    metroNIDAZOLE 500 MG tablet  Commonly known as:  FLAGYL  Take 1 tablet (500 mg total) by mouth 4 (four) times daily. Home medication     predniSONE 20 MG tablet  Commonly known as:  DELTASONE  Take 2 tablets (40 mg total) by mouth once  daily. for 5 days        CONTINUE taking these medications    acetaZOLAMIDE 500 mg Cpsr  Commonly known as:  DIAMOX  Take 1 capsule (500 mg total) by mouth 2 (two) times daily.     albuterol 90 mcg/actuation inhaler  Commonly known as:  PROVENTIL/VENTOLIN HFA  Inhale 2 puffs into the lungs every 6 (six) hours as needed for Wheezing. Rescue     albuterol-ipratropium 2.5 mg-0.5 mg/3 mL nebulizer solution  Commonly known as:  DUO-NEB  Take 3 mLs by nebulization every 6 (six) hours as needed for Wheezing or Shortness of Breath. Rescue     blood sugar diagnostic Strp  1 each by Misc.(Non-Drug; Combo Route) route 4 (four) times daily before meals and nightly. ACCU CHEK ELVIA PLUS METER     blood-glucose meter Misc  Commonly known as:  ACCU-CHEK ELVIA PLUS METER  TEST FOUR TIMES DAILY BEFORE MEALS AND EVERY NIGHT     budesonide-formoterol 160-4.5 mcg 160-4.5 mcg/actuation Hfaa  Commonly known as:  SYMBICORT  Inhale 2 puffs into the lungs every 12 (twelve) hours. Controller     capsaicin 0.1 % Crea  Apply 1 application topically 2 (two) times daily.     doxycycline 100 MG Cap  Commonly known as:  VIBRAMYCIN  Take 1 capsule (100 mg total) by mouth every 12 (twelve) hours. for 14 days     fluticasone 50 mcg/actuation nasal spray  Commonly known as:  FLONASE  1 spray (50 mcg total) by Each Nare route once daily.     GAVILYTE-G 236-22.74-6.74 -5.86 gram suspension  Generic drug:  polyethylene glycol     hydroCHLOROthiazide 25 MG tablet  Commonly known as:  HYDRODIURIL  Take 1 tablet (25 mg total) by mouth once daily.     hydrOXYzine 50 MG tablet  Commonly known as:  ATARAX  Take 1-4 at night for itching.     lancets Misc  Commonly known as:  ACCU-CHEK SOFTCLIX LANCETS  1 Device by Misc.(Non-Drug; Combo Route) route 4 (four) times daily.     levocetirizine 5 MG tablet  Commonly known as:  XYZAL  Take 1 tablet (5 mg total) by mouth every evening.     linaclotide 290 mcg Cap  Commonly known as:  LINZESS  Take 1 capsule (290 mcg  total) by mouth once daily.     losartan 100 MG tablet  Commonly known as:  COZAAR  TAKE 1 TABLET BY MOUTH EVERY DAY     medroxyPROGESTERone 10 MG tablet  Commonly known as:  PROVERA  Take 1 tablet (10 mg total) by mouth once daily. TAKE DAY 1-12 Q MONTH     meloxicam 15 MG tablet  Commonly known as:  MOBIC  TAKE 1 TABLET(15 MG) BY MOUTH DAILY AS NEEDED FOR HEADACHE     metFORMIN 500 MG 24 hr tablet  Commonly known as:  GLUCOPHAGE-XR  Take 2 tablets (1,000 mg total) by mouth 2 (two) times daily with meals.     mometasone 0.1% 0.1 % cream  Commonly known as:  ELOCON     montelukast 10 mg tablet  Commonly known as:  SINGULAIR  TAKE 1 TABLET(10 MG) BY MOUTH EVERY EVENING     nicotine polacrilex 2 MG Lozg  Take 1 lozenge (2 mg total) by mouth as needed.     ondansetron 8 MG tablet  Commonly known as:  ZOFRAN  Take 1 tablet (8 mg total) by mouth every 8 (eight) hours as needed for Nausea.     oxyCODONE-acetaminophen  mg per tablet  Commonly known as:  PERCOCET  TAKE 1 TABLET BY MOUTH EVERY 8 HOURS AS NEEDED. MAY CAUSE DROWSINESS     pantoprazole 40 MG tablet  Commonly known as:  PROTONIX  Take 1 tablet (40 mg total) by mouth 2 (two) times daily.     QUEtiapine 25 MG Tab  Commonly known as:  SEROQUEL  TAKE 1 TABLET(25 MG) BY MOUTH EVERY EVENING     sumatriptan 50 MG tablet  Commonly known as:  IMITREX  Take 1 tab at onset of headaches.  If no improvement in 2 hours, take another.  Do not take more than 2 tabs in 24 hours.     tiotropium 18 mcg inhalation capsule  Commonly known as:  SPIRIVA  Inhale 1 capsule (18 mcg total) into the lungs once daily. Controller     tiZANidine 4 MG tablet  Commonly known as:  ZANAFLEX  Take 4 mg by mouth every 8 (eight) hours.     topiramate 100 MG tablet  Commonly known as:  TOPAMAX  Take 3 tablets (300 mg total) by mouth 2 (two) times daily.     venlafaxine 75 MG 24 hr capsule  Commonly known as:  EFFEXOR-XR  TAKE 1 CAPSULE(75 MG) BY MOUTH EVERY EVENING        STOP taking these  medications    naproxen 500 MG EC tablet  Commonly known as:  EC NAPROSYN            Indwelling Lines/Drains at time of discharge:   Lines/Drains/Airways          None          Time spent on the discharge of patient: 30 minutes  Patient was seen and examined on the date of discharge and determined to be suitable for discharge.         Kiesha Dwyer NP  Department of Hospital Medicine  [facility]

## 2019-03-22 NOTE — TELEPHONE ENCOUNTER
Have patient monitor blood sugars over the weekend and watch her carb intake.  She needs to call Monday and let me know how she is doing.  If she goes over 300, she will need to go to the ED for hydration    Thanks,  Dr. Gordillo

## 2019-03-22 NOTE — TELEPHONE ENCOUNTER
Was discharged earlier today, had been in the hospital for her asthma.  Her glucose is 397 now.  Wants to know if we can send her a rx for insulin.  She has not been on insulin since September.  She is thirsty, no other symptoms of hyperglycemia.  She is on metformin 1000mg po bid currently, but was on insulin aspart per ss while in the hospital.    Paged Dr. Conrado Guerra, on call provider for FM, awaiting response.  He will not write a rx for insulin, wants her to drink water, recheck in an hour, an dif greater than 350, she should go in to the ER.  Advised patient of the recommendations.    Reason for Disposition   [1] Blood glucose > 300 mg/dl (16.7 mmol/l) AND [2] two or more times in a row    Protocols used: DIABETES - HIGH BLOOD SUGAR-A-AH

## 2019-03-22 NOTE — TELEPHONE ENCOUNTER
"I spoke to the pt. She reports that she feels "so-so" her last glucose at 3:00am was 246. Please advise.  "

## 2019-03-22 NOTE — TELEPHONE ENCOUNTER
I spoke to the pt and she verbalizes understanding of below message. Pt needs refills of her Inhalers.

## 2019-03-25 LAB
BACTERIA BLD CULT: NORMAL
BACTERIA BLD CULT: NORMAL

## 2019-03-26 ENCOUNTER — OFFICE VISIT (OUTPATIENT)
Dept: OPTOMETRY | Facility: CLINIC | Age: 49
End: 2019-03-26
Payer: COMMERCIAL

## 2019-03-26 ENCOUNTER — OFFICE VISIT (OUTPATIENT)
Dept: PODIATRY | Facility: CLINIC | Age: 49
End: 2019-03-26
Payer: MEDICARE

## 2019-03-26 VITALS — HEIGHT: 64 IN | BODY MASS INDEX: 50.02 KG/M2 | WEIGHT: 293 LBS

## 2019-03-26 DIAGNOSIS — E11.49 TYPE II DIABETES MELLITUS WITH NEUROLOGICAL MANIFESTATIONS: Primary | ICD-10-CM

## 2019-03-26 DIAGNOSIS — M79.673 CHRONIC HEEL PAIN, UNSPECIFIED LATERALITY: ICD-10-CM

## 2019-03-26 DIAGNOSIS — M21.40 PES PLANUS, UNSPECIFIED LATERALITY: ICD-10-CM

## 2019-03-26 DIAGNOSIS — G89.29 CHRONIC HEEL PAIN, UNSPECIFIED LATERALITY: ICD-10-CM

## 2019-03-26 DIAGNOSIS — M21.619 BUNION: ICD-10-CM

## 2019-03-26 DIAGNOSIS — M20.40 HAMMER TOE, UNSPECIFIED LATERALITY: ICD-10-CM

## 2019-03-26 DIAGNOSIS — H52.7 REFRACTIVE ERROR: ICD-10-CM

## 2019-03-26 DIAGNOSIS — G93.2 IDIOPATHIC INTRACRANIAL HYPERTENSION: Chronic | ICD-10-CM

## 2019-03-26 DIAGNOSIS — Z01.00 DIABETIC EYE EXAM: Primary | ICD-10-CM

## 2019-03-26 DIAGNOSIS — E11.9 DIABETIC EYE EXAM: Primary | ICD-10-CM

## 2019-03-26 DIAGNOSIS — H04.123 DRY EYE SYNDROME, BILATERAL: ICD-10-CM

## 2019-03-26 LAB
LEFT EYE DM RETINOPATHY: NEGATIVE
RIGHT EYE DM RETINOPATHY: NEGATIVE

## 2019-03-26 PROCEDURE — 99999 PR PBB SHADOW E&M-EST. PATIENT-LVL IV: CPT | Mod: PBBFAC,HCNC,, | Performed by: PODIATRIST

## 2019-03-26 PROCEDURE — 99499 UNLISTED E&M SERVICE: CPT | Mod: HCNC,S$GLB,, | Performed by: PODIATRIST

## 2019-03-26 PROCEDURE — 99213 OFFICE O/P EST LOW 20 MIN: CPT | Mod: HCNC,S$GLB,, | Performed by: PODIATRIST

## 2019-03-26 PROCEDURE — 99999 PR PBB SHADOW E&M-EST. PATIENT-LVL I: CPT | Mod: PBBFAC,HCNC,, | Performed by: OPTOMETRIST

## 2019-03-26 PROCEDURE — 92015 DETERMINE REFRACTIVE STATE: CPT | Mod: HCNC,S$GLB,, | Performed by: OPTOMETRIST

## 2019-03-26 PROCEDURE — 99999 PR PBB SHADOW E&M-EST. PATIENT-LVL IV: ICD-10-PCS | Mod: PBBFAC,HCNC,, | Performed by: PODIATRIST

## 2019-03-26 PROCEDURE — 3008F BODY MASS INDEX DOCD: CPT | Mod: HCNC,CPTII,S$GLB, | Performed by: PODIATRIST

## 2019-03-26 PROCEDURE — 3008F PR BODY MASS INDEX (BMI) DOCUMENTED: ICD-10-PCS | Mod: HCNC,CPTII,S$GLB, | Performed by: PODIATRIST

## 2019-03-26 PROCEDURE — 99499 RISK ADDL DX/OHS AUDIT: ICD-10-PCS | Mod: HCNC,S$GLB,, | Performed by: PODIATRIST

## 2019-03-26 PROCEDURE — 3044F PR MOST RECENT HEMOGLOBIN A1C LEVEL <7.0%: ICD-10-PCS | Mod: HCNC,CPTII,S$GLB, | Performed by: PODIATRIST

## 2019-03-26 PROCEDURE — 92014 PR EYE EXAM, EST PATIENT,COMPREHESV: ICD-10-PCS | Mod: HCNC,S$GLB,, | Performed by: OPTOMETRIST

## 2019-03-26 PROCEDURE — 99999 PR PBB SHADOW E&M-EST. PATIENT-LVL I: ICD-10-PCS | Mod: PBBFAC,HCNC,, | Performed by: OPTOMETRIST

## 2019-03-26 PROCEDURE — 92015 PR REFRACTION: ICD-10-PCS | Mod: HCNC,S$GLB,, | Performed by: OPTOMETRIST

## 2019-03-26 PROCEDURE — 92014 COMPRE OPH EXAM EST PT 1/>: CPT | Mod: HCNC,S$GLB,, | Performed by: OPTOMETRIST

## 2019-03-26 PROCEDURE — 99213 PR OFFICE/OUTPT VISIT, EST, LEVL III, 20-29 MIN: ICD-10-PCS | Mod: HCNC,S$GLB,, | Performed by: PODIATRIST

## 2019-03-26 PROCEDURE — 3044F HG A1C LEVEL LT 7.0%: CPT | Mod: HCNC,CPTII,S$GLB, | Performed by: PODIATRIST

## 2019-03-26 NOTE — PROGRESS NOTES
Subjective:       Patient ID: Yanni Kaiser is a 48 y.o. female      Chief Complaint   Patient presents with    Diabetic Eye Exam     History of Present Illness  Dls: 1/13/17 Dr. Canela     49 y/o female presents today for diabetic eye exam.   Pt c/o blurry vision at distance and near ou. Pt wears pal's.      this am     No tearing  +itching  + burning  No pain  + ha's  + floaters  No flashes    Eye meds  otc visine ou prn    Hemoglobin A1C       Date                     Value               Ref Range           Status        03/20/2019               6.5 (H)             4.0 - 5.6 %         Final        07/30/2018               5.8 (H)             4.0 - 5.6 %         Final        01/22/2018               5.8 (H)             4.0 - 5.6 %         Final   ----------    IIH x 2002, pt being followed by neurology, last seen August 2018        Assessment/Plan:     1. Diabetic eye exam  [facility] vision.    No diabetic retinopathy. Discussed with pt the effects of diabetes on vision, importance of good blood sugar control, compliance with meds, and follow up care with PCP. Return in 1 year for dilated eye exam, sooner PRN.    2. Refractive error  Educated patient on refractive error and discussed lens options. Dispensed updated spectacle Rx. Educated about adaptation period to new specs.    Eyeglass Final Rx     Eyeglass Final Rx       Sphere Cylinder Axis Add    Right -0.25 +0.25 015 +1.75    Left -0.50 Sphere  +1.75    Expiration Date:  3/26/2020                3. Dry eye syndrome, bilateral  Recommend artificial tears (Systane/Refresh/Blink/Thera Tears). 1 drop 4x per day and PRN. Discussed different drop options - AT/PFAT/gel/ointment. Chronicity of disease and treatment discussed.    4.Idiopathic intracranial hypertension  Since 2002 per pt. Stable compared to previous exam. Pt being followed by neurology. States no changes in vision. Monitor.      Follow up in about 1 year (around 3/26/2020) for Diabetic Eye  Exam.

## 2019-03-26 NOTE — PROGRESS NOTES
Subjective:      Patient ID: Yanni Kaiser is a 48 y.o. female.    Chief Complaint: Diabetes Mellitus (right foot top of foot (PCP  Page 1/11/19)); Diabetic Foot Exam; and Nail Care    Yanni Kaiser is a 48 y.o. femalewho presents to the clinic for evaluation and treatment of high risk feet. Yanni Kaiser   has a past medical history of Allergy, Anemia, Asthma, Cigarette smoker, Depression, Diabetes with neurologic complications, GERD (gastroesophageal reflux disease), High cholesterol, Hyperprolactinemia, Hypertension, Obese body habitus, Pseudotumor cerebri, Seizures, Simple endometrial hyperplasia (2018), Sleep apnea, and Smoker.   The patient's chief complaint is the feeling of swelling to the feet and continuedright heel pain.     8/8/18: Presents for worsening LEFT heel pain. Reports pain to right heel has improved. Currently on percocet prescribed by pain management.     9/20/18: Reports pain is 20% improved to left heel. States she has started wearing tennis shoes. S/p injection LEFT heel. Reports pain to right heel well controlled.     10/30/18: Reports Continued pain to B/L heel L>R. Completed one session of physical therapy however was not able to f/u as her  had recent surgery. Also reports discoloration of skin to site of prior left heel injection.     12/26/18: Reports continued left heel pain 8/10. Has not been able to go to PT due to personal issues. Presents today in diabetic she states are 4 years old. Also reports discoloration to skin right ankle.     3/26/19: Reports continued pain to left heel. Declines PT. Presents in diabetic shoes once again that are 2 years old.     PCP: Carlyle Gordillo MD    Date Last Seen by PCP:   Chief Complaint   Patient presents with    Diabetes Mellitus     right foot top of foot (PCP  Page 1/11/19)    Diabetic Foot Exam    Nail Care       Current shoe gear:   champions boat shoes with inserts     This patient has documented high  risk feet requiring routine maintenance secondary to diabetes mellitis and those secondary complications of diabetes, as mentioned..      Hemoglobin A1C   Date Value Ref Range Status   03/20/2019 6.5 (H) 4.0 - 5.6 % Final     Comment:     ADA Screening Guidelines:  5.7-6.4%  Consistent with prediabetes  >or=6.5%  Consistent with diabetes  High levels of fetal hemoglobin interfere with the HbA1C  assay. Heterozygous hemoglobin variants (HbS, HgC, etc)do  not significantly interfere with this assay.   However, presence of multiple variants may affect accuracy.     07/30/2018 5.8 (H) 4.0 - 5.6 % Final     Comment:     ADA Screening Guidelines:  5.7-6.4%  Consistent with prediabetes  >or=6.5%  Consistent with diabetes  High levels of fetal hemoglobin interfere with the HbA1C  assay. Heterozygous hemoglobin variants (HbS, HgC, etc)do  not significantly interfere with this assay.   However, presence of multiple variants may affect accuracy.     01/22/2018 5.8 (H) 4.0 - 5.6 % Final     Comment:     According to ADA guidelines, hemoglobin A1c <7.0% represents  optimal control in non-pregnant diabetic patients. Different  metrics may apply to specific patient populations.   Standards of Medical Care in Diabetes-2016.  For the purpose of screening for the presence of diabetes:  <5.7%     Consistent with the absence of diabetes  5.7-6.4%  Consistent with increasing risk for diabetes   (prediabetes)  >or=6.5%  Consistent with diabetes  Currently, no consensus exists for use of hemoglobin A1c  for diagnosis of diabetes for children.  This Hemoglobin A1c assay has significant interference with fetal   hemoglobin   (HbF). The results are invalid for patients with abnormal amounts of   HbF,   including those with known Hereditary Persistence   of Fetal Hemoglobin. Heterozygous hemoglobin variants (HbAS, HbAC,   HbAD, HbAE, HbA2) do not significantly interfere with this assay;   however, presence of multiple variants in a sample may  impact the %   interference.       Patient Active Problem List   Diagnosis    Mild intermittent asthma    SHARATH (obstructive sleep apnea)    Leg varices    Low back pain    Migraine    Morbid obesity with BMI of 60.0-69.9, adult    Tobacco use disorder, moderate, dependence    Exacerbation of asthma    Anemia of chronic disease    Mild protein malnutrition    Weakness    Allergic rhinitis    Idiopathic intracranial hypertension    Essential hypertension    Combined hyperlipidemia associated with type 2 diabetes mellitus    Depression    Hyperlipidemia    GERD (gastroesophageal reflux disease)    Epilepsy    Plantar fasciitis, bilateral    Numbness of right hand    Muscle tightness    Muscle weakness    Type 2 diabetes mellitus with stage 3 chronic kidney disease, with long-term current use of insulin    Recurrent major depressive disorder, in full remission    Simple chronic bronchitis    Other chronic pain    Suicide attempt    Asthma with severe exacerbation    Opioid dependence    Lumbar stenosis    Chest pain    Foot pain, bilateral    Antalgic gait    Dysfunctional uterine bleeding    DUB (dysfunctional uterine bleeding)    S/P D&C (status post dilation and curettage)    Simple endometrial hyperplasia    Anemia, iron deficiency     Current Outpatient Medications on File Prior to Visit   Medication Sig Dispense Refill    acetaZOLAMIDE (DIAMOX) 500 mg CpSR Take 1 capsule (500 mg total) by mouth 2 (two) times daily. 360 capsule 1    albuterol (PROVENTIL/VENTOLIN HFA) 90 mcg/actuation inhaler INHALE 2 PUFFS BY MOUTH EVERY 6 HOURS AS NEEDED FOR WHEEZING 54 g 0    albuterol-ipratropium (DUO-NEB) 2.5 mg-0.5 mg/3 mL nebulizer solution Take 3 mLs by nebulization every 6 (six) hours as needed for Wheezing or Shortness of Breath. Rescue 100 mL 3    blood sugar diagnostic Strp 1 each by Misc.(Non-Drug; Combo Route) route 4 (four) times daily before meals and nightly. ACCU CHEK ELVIA  PLUS METER 200 each 3    blood-glucose meter (ACCU-CHEK ELVIA PLUS METER) Misc TEST FOUR TIMES DAILY BEFORE MEALS AND EVERY NIGHT 90 each 1    budesonide-formoterol 160-4.5 mcg (SYMBICORT) 160-4.5 mcg/actuation HFAA Inhale 2 puffs into the lungs every 12 (twelve) hours. Controller 1 Inhaler 5    capsaicin 0.1 % Crea Apply 1 application topically 2 (two) times daily. 56.6 g 1    doxycycline (VIBRAMYCIN) 100 MG Cap Take 1 capsule (100 mg total) by mouth every 12 (twelve) hours. for 14 days 28 capsule 0    fluticasone (FLONASE) 50 mcg/actuation nasal spray 1 spray (50 mcg total) by Each Nare route once daily. 15.8 mL 2    GAVILYTE-G 236-22.74-6.74 -5.86 gram suspension   0    hydroCHLOROthiazide (HYDRODIURIL) 25 MG tablet Take 1 tablet (25 mg total) by mouth once daily. 30 tablet 11    hydrOXYzine (ATARAX) 50 MG tablet Take 1-4 at night for itching. 120 tablet 3    lancets (ACCU-CHEK SOFTCLIX LANCETS) Misc 1 Device by Misc.(Non-Drug; Combo Route) route 4 (four) times daily. 200 each 3    levocetirizine (XYZAL) 5 MG tablet Take 1 tablet (5 mg total) by mouth every evening. 30 tablet 11    linaclotide (LINZESS) 290 mcg Cap Take 1 capsule (290 mcg total) by mouth once daily. 90 capsule 3    losartan (COZAAR) 100 MG tablet TAKE 1 TABLET BY MOUTH EVERY DAY 90 tablet 0    medroxyPROGESTERone (PROVERA) 10 MG tablet Take 1 tablet (10 mg total) by mouth once daily. TAKE DAY 1-12 Q MONTH 12 tablet 12    meloxicam (MOBIC) 15 MG tablet TAKE 1 TABLET(15 MG) BY MOUTH DAILY AS NEEDED FOR HEADACHE 90 tablet 1    metFORMIN (GLUCOPHAGE-XR) 500 MG 24 hr tablet Take 2 tablets (1,000 mg total) by mouth 2 (two) times daily with meals. 360 tablet 0    metroNIDAZOLE (FLAGYL) 500 MG tablet Take 1 tablet (500 mg total) by mouth 4 (four) times daily. Home medication      mometasone 0.1% (ELOCON) 0.1 % cream       montelukast (SINGULAIR) 10 mg tablet TAKE 1 TABLET(10 MG) BY MOUTH EVERY EVENING 30 tablet 0    nicotine polacrilex  2 MG Lozg Take 1 lozenge (2 mg total) by mouth as needed. 168 lozenge 0    ondansetron (ZOFRAN) 8 MG tablet Take 1 tablet (8 mg total) by mouth every 8 (eight) hours as needed for Nausea. 90 tablet 5    oxyCODONE-acetaminophen (PERCOCET)  mg per tablet TAKE 1 TABLET BY MOUTH EVERY 8 HOURS AS NEEDED. MAY CAUSE DROWSINESS  0    pantoprazole (PROTONIX) 40 MG tablet Take 1 tablet (40 mg total) by mouth 2 (two) times daily. 180 tablet 3    predniSONE (DELTASONE) 20 MG tablet Take 2 tablets (40 mg total) by mouth once daily. for 5 days 10 tablet 0    QUEtiapine (SEROQUEL) 25 MG Tab TAKE 1 TABLET(25 MG) BY MOUTH EVERY EVENING 90 tablet 0    sumatriptan (IMITREX) 50 MG tablet Take 1 tab at onset of headaches.  If no improvement in 2 hours, take another.  Do not take more than 2 tabs in 24 hours. 9 tablet 5    tiotropium (SPIRIVA) 18 mcg inhalation capsule Inhale 1 capsule (18 mcg total) into the lungs once daily. Controller 30 capsule 11    tiZANidine (ZANAFLEX) 4 MG tablet Take 4 mg by mouth every 8 (eight) hours.       topiramate (TOPAMAX) 100 MG tablet Take 3 tablets (300 mg total) by mouth 2 (two) times daily. 180 tablet 5    venlafaxine (EFFEXOR-XR) 75 MG 24 hr capsule TAKE 1 CAPSULE(75 MG) BY MOUTH EVERY EVENING 90 capsule 1     No current facility-administered medications on file prior to visit.      Review of patient's allergies indicates:  No Known Allergies  Past Surgical History:   Procedure Laterality Date    BREAST CYST ASPIRATION       SECTION      X 1    CHOLECYSTECTOMY      COLONOSCOPY      COLONOSCOPY N/A 2019    Performed [facility] (2ND F[facility])    EGD (ESOPHAGOGASTRODUODENOSCOPY) N/A 2019    Performed by [facility] at [facility] ENDO (2ND FLR)    HYSTEROSCOPY, WITH DILATION AND CURETTAGE OF UTERUS N/A 10/16/2018    [facility]    SINUS SURGERY      UPPER GASTROINTESTINAL ENDOSCOPY       Family History    Problem Relation Age of Onset    Hypertension Mother     Diabetes Mother     Colon cancer Mother 50    Colon polyps Mother     Heart disease Father     COPD Father     Heart attack Father     Hypertension Father     Ulcers Father     Cancer Maternal Grandmother     Breast cancer Maternal Grandmother     Colon cancer Maternal Grandmother     Colon polyps Maternal Grandmother     Celiac disease Neg Hx     Cirrhosis Neg Hx     Crohn's disease Neg Hx     Cystic fibrosis Neg Hx     Esophageal cancer Neg Hx     Hemochromatosis Neg Hx     Inflammatory bowel disease Neg Hx     Irritable bowel syndrome Neg Hx     Liver cancer Neg Hx     Liver disease Neg Hx     Rectal cancer Neg Hx     Stomach cancer Neg Hx     Ulcerative colitis Neg Hx     Jonnie's disease Neg Hx     Tuberculosis Neg Hx     Lymphoma Neg Hx     Scleroderma Neg Hx     Rheum arthritis Neg Hx     Melanoma Neg Hx     Multiple sclerosis Neg Hx     Psoriasis Neg Hx     Lupus Neg Hx     Skin cancer Neg Hx      Social History     Socioeconomic History    Marital status:      Spouse name: Not on file    Number of children: Not on file    Years of education: Not on file    Highest education level: Not on file   Occupational History    Not on file   Social Needs    Financial resource strain: Not on file    Food insecurity:     Worry: Not on file     Inability: Not on file    Transportation needs:     Medical: Not on file     Non-medical: Not on file   Tobacco Use    Smoking status: Current Every Day Smoker     Packs/day: 1.00     Years: 15.00     Pack years: 15.00     Types: Cigarettes    Smokeless tobacco: Never Used    Tobacco comment: 1 pack every other day   Substance and Sexual Activity    Alcohol use: No     Alcohol/week: 0.0 oz    Drug use: No    Sexual activity: Yes     Partners: Male     Birth control/protection: None   Lifestyle    Physical activity:     Days per week: Not on file     Minutes per  "session: Not on file    Stress: Not on file   Relationships    Social connections:     Talks on phone: Not on file     Gets together: Not on file     Attends L[employer] day service: Not on file     Active member of club or organization: Not on file     Attends meetings of clubs or organizations: Not on file     Relationship status: Not on file    Intimate partner violence:     Fear of current or ex partner: Not on file     Emotionally abused: Not on file     Physically abused: Not on file     Forced sexual activity: Not on file   Other Topics Concern    Not on file   Social History Narrative    Not on file        Review of Systems   Constitution: Negative for chills and fever.   Cardiovascular: Positive for leg swelling. Negative for chest pain and claudication.   Respiratory: Negative for cough and shortness of breath.    Skin: Positive for dry skin and nail changes. Negative for itching and rash.   Musculoskeletal: Positive for arthritis, joint pain, muscle cramps and myalgias. Negative for falls, joint swelling and muscle weakness.   Gastrointestinal: Negative for diarrhea, nausea and vomiting.   Neurological: Positive for numbness and paresthesias. Negative for tremors and weakness.   Psychiatric/Behavioral: Negative for altered mental status and hallucinations.           Objective:       Vitals:    03/26/19 0854   Weight: (!) 170.1 kg (375 lb)   Height: 5' 4" (1.626 m)   PainSc:   6       Physical Exam   Constitutional:   General: Pt. is well-developed, well-nourished, appears stated age, in no acute distress, alert and oriented x 3. No evidence of depression, anxiety, or agitation. Calm, cooperative, and communicative. Appropriate interactions and affect.       Cardiovascular:   Pulses:       Dorsalis pedis pulses are 1+ on the right side, and 1+ on the left side.        Posterior tibial pulses are 1+ on the right side, and 1+ on the left side.   Dorsalis pedis and posterior tibial pulses are diminished " Bilaterally. Toes are cool to touch. Feet are warm proximally.There is decreased digital hair. Skin is atrophic, hyperpigmented, and mildly edematous       Musculoskeletal:        Right ankle: She exhibits decreased range of motion and swelling. No tenderness. No lateral malleolus, no medial malleolus, no CF ligament, no head of 5th metatarsal and no proximal fibula tenderness found. Achilles tendon exhibits no pain and no defect.        Left ankle: She exhibits decreased range of motion and swelling. No tenderness. No lateral malleolus, no medial malleolus, no head of 5th metatarsal and no proximal fibula tenderness found. Achilles tendon exhibits no pain and no defect.        Right foot: There is tenderness (Plantar medial calcaneal tuber. mild pain to palpation of 5th met hase and resistance to eversion). There is normal range of motion.        Left foot: There is tenderness (plantar calcaneal medial tuber. mild pain with palpation of 5th met base and eversion resistance. ). There is normal range of motion.   Biomechanical exam: Pain on palpation bilateral medial calcaneal tubercle at origin of plantar fascia. L>R,  There is equinus deformity bilateral with decreased dorsiflexion at the ankle joint bilateral. No tenderness with compression of heel. Negative tinels sign.     Muscle strength is 5/5 in all groups bilaterally.    Decreased stride, station of gait.  apropulsive toe off.  Increased angle and base of gait.    Patient has hammertoes of digits 2-5 bilateral partially reducible without symptom today.    Decreased first MPJ range of motion both weightbearing and nonweightbearing, no crepitus observed the first MP joint,+ dorsal flag sign. Mild  bunion deformity is observed .    Decreased arch height noted b/l.    Neurological: A sensory deficit is present.   [place]-Arjun 5.07 monofilamant testing is diminished Maciej feet. Sharp/dull sensation diminished Bilaterally.     Paresthesias, and hyperesthesia  bilateral feet with no clearly identified trigger or source.     Skin: Skin is warm, dry and intact. No abrasion, no ecchymosis, no lesion and no rash noted. She is not diaphoretic. No cyanosis or erythema. No pallor. Nails show no clubbing.   Toenails 1-5 bilaterally neatly trimmed and painted thickened by 2-3 mm,     Interdigital Spaces clean, dry and without evidence of break in skin integrity      Hyperpigmentation noted to left medial heel. No ulceration noted. No drainage noted.    Psychiatric: She has a normal mood and affect. Her speech is normal.   Nursing note and vitals reviewed.            Assessment:       Encounter Diagnoses   Name Primary?    Type II diabetes mellitus with neurological manifestations Yes    Chronic heel pain, unspecified laterality     Pes planus, unspecified laterality     Bunion     Hammer toe, unspecified laterality          Plan:       Yanni was seen today for diabetes mellitus, diabetic foot exam and nail care.    Diagnoses and all orders for this visit:    Type II diabetes mellitus with neurological manifestations  -     DIABETIC SHOES FOR HOME USE    Chronic heel pain, unspecified laterality  -     DIABETIC SHOES FOR HOME USE    Pes planus, unspecified laterality    Bunion  -     DIABETIC SHOES FOR HOME USE    Hammer toe, unspecified laterality  -     DIABETIC SHOES FOR HOME USE      I counseled the patient on her conditions, their implications and medical management.     Declines PT, CAM boot.     Rx diabetic shoes with custom molded inserts to be worn at all times while ambulating. Prescription provided with list of local retailers.     - Shoe inspection. Diabetic Foot Education. Patient reminded of the importance of good nutrition and blood sugar control to help prevent podiatric complications of diabetes. Patient instructed on proper foot hygeine. We discussed wearing proper shoe gear, daily foot inspections, never walking without protective shoe gear, never putting  sharp instruments to feet.     Discussed different treatment options for foot pain. I gave written and verbal instructions on stretching exercises. Patient expressed understanding. Discussed icing the affected area as needed and also wearing appropriate shoe gear and avoiding flats, slippers, sandals, and going barefoot. My recommendation for OTC supports is [employer]. Patient instructed on adequate icing techniques. Patient should ice the affected area at least once per day x 10 minutes for 10 days . I advised the patient that extra icing would also be beneficial to ensure adequate anti inflammatory effect. We also discussed cortisone injections and NSAID therapy.       Night splint dispensed.- continue     RTC in 6 weeks  sooner PRN    Laura Barnes DPM

## 2019-03-26 NOTE — PROGRESS NOTES
Patient, Yanni Kaiser (MRN #[id number]), presented with a recorded BMI of 64.37 kg/m^2 consistent with the definition of morbid obesity (ICD-10 E66.01). The patient's morbid obesity was monitored, evaluated, addressed and/or treated. This addendum to the medical record is made on 03/26/2019.

## 2019-03-28 ENCOUNTER — OFFICE VISIT (OUTPATIENT)
Dept: FAMILY MEDICINE | Facility: CLINIC | Age: 49
End: 2019-03-28
Payer: MEDICARE

## 2019-03-28 VITALS
RESPIRATION RATE: 17 BRPM | BODY MASS INDEX: 50.02 KG/M2 | SYSTOLIC BLOOD PRESSURE: 118 MMHG | OXYGEN SATURATION: 96 % | HEIGHT: 64 IN | HEART RATE: 85 BPM | TEMPERATURE: 99 F | DIASTOLIC BLOOD PRESSURE: 84 MMHG | WEIGHT: 293 LBS

## 2019-03-28 DIAGNOSIS — G43.009 MIGRAINE WITHOUT AURA AND WITHOUT STATUS MIGRAINOSUS, NOT INTRACTABLE: Chronic | ICD-10-CM

## 2019-03-28 DIAGNOSIS — N18.30 TYPE 2 DIABETES MELLITUS WITH STAGE 3 CHRONIC KIDNEY DISEASE, WITH LONG-TERM CURRENT USE OF INSULIN: ICD-10-CM

## 2019-03-28 DIAGNOSIS — E11.22 TYPE 2 DIABETES MELLITUS WITH STAGE 3 CHRONIC KIDNEY DISEASE, WITH LONG-TERM CURRENT USE OF INSULIN: ICD-10-CM

## 2019-03-28 DIAGNOSIS — Z79.4 TYPE 2 DIABETES MELLITUS WITH STAGE 3 CHRONIC KIDNEY DISEASE, WITH LONG-TERM CURRENT USE OF INSULIN: ICD-10-CM

## 2019-03-28 DIAGNOSIS — G47.33 OSA (OBSTRUCTIVE SLEEP APNEA): ICD-10-CM

## 2019-03-28 DIAGNOSIS — I10 ESSENTIAL HYPERTENSION: Chronic | ICD-10-CM

## 2019-03-28 DIAGNOSIS — J45.41 MODERATE PERSISTENT ASTHMA WITH EXACERBATION: Primary | ICD-10-CM

## 2019-03-28 DIAGNOSIS — F33.42 RECURRENT MAJOR DEPRESSIVE DISORDER, IN FULL REMISSION: ICD-10-CM

## 2019-03-28 DIAGNOSIS — F11.20 UNCOMPLICATED OPIOID DEPENDENCE: ICD-10-CM

## 2019-03-28 DIAGNOSIS — F17.200 TOBACCO USE DISORDER, MODERATE, DEPENDENCE: ICD-10-CM

## 2019-03-28 DIAGNOSIS — G40.909 NONINTRACTABLE EPILEPSY WITHOUT STATUS EPILEPTICUS, UNSPECIFIED EPILEPSY TYPE: Chronic | ICD-10-CM

## 2019-03-28 DIAGNOSIS — M48.061 SPINAL STENOSIS OF LUMBAR REGION, UNSPECIFIED WHETHER NEUROGENIC CLAUDICATION PRESENT: ICD-10-CM

## 2019-03-28 DIAGNOSIS — E66.01 MORBID OBESITY WITH BMI OF 60.0-69.9, ADULT: Chronic | ICD-10-CM

## 2019-03-28 DIAGNOSIS — E44.1 MILD PROTEIN MALNUTRITION: Chronic | ICD-10-CM

## 2019-03-28 DIAGNOSIS — G93.2 IDIOPATHIC INTRACRANIAL HYPERTENSION: Chronic | ICD-10-CM

## 2019-03-28 DIAGNOSIS — E78.5 HYPERLIPIDEMIA, UNSPECIFIED HYPERLIPIDEMIA TYPE: Chronic | ICD-10-CM

## 2019-03-28 PROBLEM — J41.0 SIMPLE CHRONIC BRONCHITIS: Status: RESOLVED | Noted: 2018-01-22 | Resolved: 2019-03-28

## 2019-03-28 PROBLEM — R20.0 NUMBNESS OF RIGHT HAND: Status: RESOLVED | Noted: 2017-02-22 | Resolved: 2019-03-28

## 2019-03-28 PROBLEM — J45.901 ASTHMA WITH SEVERE EXACERBATION: Status: RESOLVED | Noted: 2018-09-04 | Resolved: 2019-03-28

## 2019-03-28 PROCEDURE — 99214 PR OFFICE/OUTPT VISIT, EST, LEVL IV, 30-39 MIN: ICD-10-PCS | Mod: HCNC,S$GLB,, | Performed by: FAMILY MEDICINE

## 2019-03-28 PROCEDURE — 3044F HG A1C LEVEL LT 7.0%: CPT | Mod: HCNC,CPTII,S$GLB, | Performed by: FAMILY MEDICINE

## 2019-03-28 PROCEDURE — 3044F PR MOST RECENT HEMOGLOBIN A1C LEVEL <7.0%: ICD-10-PCS | Mod: HCNC,CPTII,S$GLB, | Performed by: FAMILY MEDICINE

## 2019-03-28 PROCEDURE — 3074F PR MOST RECENT SYSTOLIC BLOOD PRESSURE < 130 MM HG: ICD-10-PCS | Mod: HCNC,CPTII,S$GLB, | Performed by: FAMILY MEDICINE

## 2019-03-28 PROCEDURE — 99999 PR PBB SHADOW E&M-EST. PATIENT-LVL IV: CPT | Mod: PBBFAC,HCNC,, | Performed by: FAMILY MEDICINE

## 2019-03-28 PROCEDURE — 3079F DIAST BP 80-89 MM HG: CPT | Mod: HCNC,CPTII,S$GLB, | Performed by: FAMILY MEDICINE

## 2019-03-28 PROCEDURE — 99999 PR PBB SHADOW E&M-EST. PATIENT-LVL IV: ICD-10-PCS | Mod: PBBFAC,HCNC,, | Performed by: FAMILY MEDICINE

## 2019-03-28 PROCEDURE — 3008F BODY MASS INDEX DOCD: CPT | Mod: HCNC,CPTII,S$GLB, | Performed by: FAMILY MEDICINE

## 2019-03-28 PROCEDURE — 99499 UNLISTED E&M SERVICE: CPT | Mod: HCNC,S$GLB,, | Performed by: FAMILY MEDICINE

## 2019-03-28 PROCEDURE — 99499 RISK ADDL DX/OHS AUDIT: ICD-10-PCS | Mod: HCNC,S$GLB,, | Performed by: FAMILY MEDICINE

## 2019-03-28 PROCEDURE — 3079F PR MOST RECENT DIASTOLIC BLOOD PRESSURE 80-89 MM HG: ICD-10-PCS | Mod: HCNC,CPTII,S$GLB, | Performed by: FAMILY MEDICINE

## 2019-03-28 PROCEDURE — 3074F SYST BP LT 130 MM HG: CPT | Mod: HCNC,CPTII,S$GLB, | Performed by: FAMILY MEDICINE

## 2019-03-28 PROCEDURE — 99214 OFFICE O/P EST MOD 30 MIN: CPT | Mod: HCNC,S$GLB,, | Performed by: FAMILY MEDICINE

## 2019-03-28 PROCEDURE — 3008F PR BODY MASS INDEX (BMI) DOCUMENTED: ICD-10-PCS | Mod: HCNC,CPTII,S$GLB, | Performed by: FAMILY MEDICINE

## 2019-03-28 NOTE — PROGRESS NOTES
Routine Office Visit    Patient Name: Yanni Kaiser    : 1970  MRN: [id number]    Subjective:  Yanni is a 48 y.o. female who presents today for:    1. Sinus pain  Patient presenting today with continued left frontal sinus pain.  She was recently admitted for asthma exacerbation and continues to smoke.  She was treated for allergies several weeks ago, but states this was not present at this time.  She has not had any fevers.  She does smoke despite having asthma.  There has been no rashes, n/v/d.      Past Medical History  Past Medical History:   Diagnosis Date    Allergy     Anemia     Asthma     Cigarette smoker     Depression     Diabetes with neurologic complications     GERD (gastroesophageal reflux disease)     High cholesterol     Hyperprolactinemia     Hypertension     Obese body habitus     Pseudotumor cerebri     Seizures     Simple endometrial hyperplasia 2018    Sleep apnea     Smoker        Past Surgical History  Past Surgical History:   Procedure Laterality Date    BREAST CYST ASPIRATION       SECTION      X 1    CHOLECYSTECTOMY      COLONOSCOPY      COLONOSCOPY N/A 2019    Performed by Jagruti Sweeney MD at [facility] (2ND FLR)    EGD (ESOPHAGOGASTRODUODENOSCOPY) N/A 2019    Performed by Jagruti Sweeney MD at [facility] (2ND FLR)    HYSTEROSCOPY, WITH DILATION AND CURETTAGE OF UTERUS N/A 10/16/2018    Performed by Glenn Black Jr., [facility] at [facility]    RETINAL LASER PROCEDURE Left     SINUS SURGERY      UPPER GASTROINTESTINAL ENDOSCOPY         Family History  Family History   Problem Relation Age of Onset    Hypertension Mother     Diabetes Mother     Colon cancer Mother 50    Colon polyps Mother     Heart disease Father     COPD Father     Heart attack Father     Hypertension Father     Ulcers Father     Cancer Maternal Grandmother     Breast cancer Maternal Grandmother     Colon cancer Maternal Grandmother     Colon polyps  Maternal Grandmother     No Known Problems Paternal Grandmother     No Known Problems Sister     No Known Problems Brother     No Known Problems Maternal Aunt     No Known Problems Maternal Uncle     No Known Problems Paternal Aunt     No Known Problems Paternal Uncle     No Known Problems Maternal Grandfather     No Known Problems Paternal Grandfather     Celiac disease Neg Hx     Cirrhosis Neg Hx     Crohn's disease Neg Hx     Cystic fibrosis Neg Hx     Esophageal cancer Neg Hx     Hemochromatosis Neg Hx     Inflammatory bowel disease Neg Hx     Irritable bowel syndrome Neg Hx     Liver cancer Neg Hx     Liver disease Neg Hx     Rectal cancer Neg Hx     Stomach cancer Neg Hx     Ulcerative colitis Neg Hx     Jonnie's disease Neg Hx     Tuberculosis Neg Hx     Lymphoma Neg Hx     Scleroderma Neg Hx     Rheum arthritis Neg Hx     Melanoma Neg Hx     Multiple sclerosis Neg Hx     Psoriasis Neg Hx     Lupus Neg Hx     Skin cancer Neg Hx     Amblyopia Neg Hx     Blindness Neg Hx     Cataracts Neg Hx     Glaucoma Neg Hx     Macular degeneration Neg Hx     Retinal detachment Neg Hx     Strabismus Neg Hx     Stroke Neg Hx     Thyroid disease Neg Hx        Social History  Social History     Socioeconomic History    Marital status:      Spouse name: Not on file    Number of children: Not on file    Years of education: Not on file    Highest education level: Not on file   Occupational History    Not on file   Social Needs    Financial resource strain: Not on file    Food insecurity:     Worry: Not on file     Inability: Not on file    Transportation needs:     Medical: Not on file     Non-medical: Not on file   Tobacco Use    Smoking status: Current Every Day Smoker     Packs/day: 1.00     Years: 15.00     Pack years: 15.00     Types: Cigarettes    Smokeless tobacco: Never Used    Tobacco comment: 1 pack every other day   Substance and Sexual Activity    Alcohol  use: No     Alcohol/week: 0.0 oz    Drug use: No    Sexual activity: Yes     Partners: Male     Birth control/protection: None   Lifestyle    Physical activity:     Days per week: Not on file     Minutes per session: Not on file    Stress: Not on file   Relationships    Social connections:     Talks on phone: Not on file     Gets together: Not on file     Attends Anabaptism service: Not on file     Active member of club or organization: Not on file     Attends meetings of clubs or organizations: Not on file     Relationship status: Not on file    Intimate partner violence:     Fear of current or ex partner: Not on file     Emotionally abused: Not on file     Physically abused: Not on file     Forced sexual activity: Not on file   Other Topics Concern    Not on file   Social History Narrative    Not on file       Current Medications  Current Outpatient Medications on File Prior to Visit   Medication Sig Dispense Refill    acetaZOLAMIDE (DIAMOX) 500 mg CpSR Take 1 capsule (500 mg total) by mouth 2 (two) times daily. 360 capsule 1    albuterol (PROVENTIL/VENTOLIN HFA) 90 mcg/actuation inhaler INHALE 2 PUFFS BY MOUTH EVERY 6 HOURS AS NEEDED FOR WHEEZING 54 g 0    albuterol-ipratropium (DUO-NEB) 2.5 mg-0.5 mg/3 mL nebulizer solution Take 3 mLs by nebulization every 6 (six) hours as needed for Wheezing or Shortness of Breath. Rescue 100 mL 3    blood sugar diagnostic Strp 1 each by Misc.(Non-Drug; Combo Route) route 4 (four) times daily before meals and nightly. ACCU CHEK ELVIA PLUS METER 200 each 3    blood-glucose meter (ACCU-CHEK ELVIA PLUS METER) Misc TEST FOUR TIMES DAILY BEFORE MEALS AND EVERY NIGHT 90 each 1    budesonide-formoterol 160-4.5 mcg (SYMBICORT) 160-4.5 mcg/actuation HFAA Inhale 2 puffs into the lungs every 12 (twelve) hours. Controller 1 Inhaler 5    capsaicin 0.1 % Crea Apply 1 application topically 2 (two) times daily. 56.6 g 1    doxycycline (VIBRAMYCIN) 100 MG Cap Take 1 capsule (100  mg total) by mouth every 12 (twelve) hours. for 14 days 28 capsule 0    fluticasone (FLONASE) 50 mcg/actuation nasal spray 1 spray (50 mcg total) by Each Nare route once daily. 15.8 mL 2    GAVILYTE-G 236-22.74-6.74 -5.86 gram suspension   0    hydroCHLOROthiazide (HYDRODIURIL) 25 MG tablet Take 1 tablet (25 mg total) by mouth once daily. 30 tablet 11    hydrOXYzine (ATARAX) 50 MG tablet Take 1-4 at night for itching. 120 tablet 3    lancets (ACCU-CHEK SOFTCLIX LANCETS) Misc 1 Device by Misc.(Non-Drug; Combo Route) route 4 (four) times daily. 200 each 3    levocetirizine (XYZAL) 5 MG tablet Take 1 tablet (5 mg total) by mouth every evening. 30 tablet 11    linaclotide (LINZESS) 290 mcg Cap Take 1 capsule (290 mcg total) by mouth once daily. 90 capsule 3    losartan (COZAAR) 100 MG tablet TAKE 1 TABLET BY MOUTH EVERY DAY 90 tablet 0    medroxyPROGESTERone (PROVERA) 10 MG tablet Take 1 tablet (10 mg total) by mouth once daily. TAKE DAY 1-12 Q MONTH 12 tablet 12    meloxicam (MOBIC) 15 MG tablet TAKE 1 TABLET(15 MG) BY MOUTH DAILY AS NEEDED FOR HEADACHE 90 tablet 1    metFORMIN (GLUCOPHAGE-XR) 500 MG 24 hr tablet Take 2 tablets (1,000 mg total) by mouth 2 (two) times daily with meals. 360 tablet 0    metroNIDAZOLE (FLAGYL) 500 MG tablet Take 1 tablet (500 mg total) by mouth 4 (four) times daily. Home medication      mometasone 0.1% (ELOCON) 0.1 % cream       montelukast (SINGULAIR) 10 mg tablet TAKE 1 TABLET(10 MG) BY MOUTH EVERY EVENING 30 tablet 0    nicotine polacrilex 2 MG Lozg Take 1 lozenge (2 mg total) by mouth as needed. 168 lozenge 0    ondansetron (ZOFRAN) 8 MG tablet Take 1 tablet (8 mg total) by mouth every 8 (eight) hours as needed for Nausea. 90 tablet 5    oxyCODONE-acetaminophen (PERCOCET)  mg per tablet TAKE 1 TABLET BY MOUTH EVERY 8 HOURS AS NEEDED. MAY CAUSE DROWSINESS  0    pantoprazole (PROTONIX) 40 MG tablet Take 1 tablet (40 mg total) by mouth 2 (two) times daily. 180  "tablet 3    QUEtiapine (SEROQUEL) 25 MG Tab TAKE 1 TABLET(25 MG) BY MOUTH EVERY EVENING 90 tablet 0    sumatriptan (IMITREX) 50 MG tablet Take 1 tab at onset of headaches.  If no improvement in 2 hours, take another.  Do not take more than 2 tabs in 24 hours. 9 tablet 5    tiotropium (SPIRIVA) 18 mcg inhalation capsule Inhale 1 capsule (18 mcg total) into the lungs once daily. Controller 30 capsule 11    tiZANidine (ZANAFLEX) 4 MG tablet Take 4 mg by mouth every 8 (eight) hours.       topiramate (TOPAMAX) 100 MG tablet Take 3 tablets (300 mg total) by mouth 2 (two) times daily. 180 tablet 5    venlafaxine (EFFEXOR-XR) 75 MG 24 hr capsule TAKE 1 CAPSULE(75 MG) BY MOUTH EVERY EVENING 90 capsule 1     No current facility-administered medications on file prior to visit.        Allergies   Review of patient's allergies indicates:  No Known Allergies    Review of Systems (Pertinent positives)  Review of Systems   Constitutional: Positive for malaise/fatigue.   HENT: Positive for congestion, ear pain, sinus pain and sore throat.    Eyes: Negative.    Respiratory: Positive for cough, sputum production and shortness of breath. Negative for hemoptysis.    Cardiovascular: Negative for chest pain, leg swelling and PND.   Gastrointestinal: Negative for abdominal pain and vomiting.   Musculoskeletal: Positive for neck pain.   Skin: Negative for rash.   Neurological: Positive for headaches.         /84 (BP Location: Left arm, Patient Position: Sitting, BP Method: Medium (Manual))   Pulse 85   Temp 98.7 °F (37.1 °C) (Oral)   Resp 17   Ht 5' 4" (1.626 m)   Wt (!) 165.3 kg (364 lb 6.7 oz)   LMP  (LMP Unknown)   SpO2 96%   BMI 62.55 kg/m²     GENERAL APPEARANCE: in no apparent distress and well developed and well nourished  HEENT: PERRL, EOMI, Sclera clear, anicteric, Oropharynx clear, no lesions, Neck supple with midline trachea; pain on palpation of left frontal sinus  NECK: normal, supple, no adenopathy, thyroid " normal in size  RESPIRATORY: appears well, vitals normal, no respiratory distress, acyanotic, normal RR, diffuse wheezing without retractions on exam  HEART: regular rate and rhythm, S1, S2 normal, no murmur, click, rub or gallop.    ABDOMEN: abdomen is soft without tenderness, no masses, no hernias, no organomegaly, no rebound, no guarding. Suprapubic tenderness absent. No CVA tenderness.  SKIN: no rashes, no wounds, no other lesions  PSYCH: Alert, oriented x 3, thought content appropriate, speech normal, pleasant and cooperative, good eye contact, well groomed    Assessment/Plan:  Yanni Kaiser is a 48 y.o. female who presents today for :    Yanni was seen today for asthma and fatigue.    Diagnoses and all orders for this visit:    Moderate persistent asthma with exacerbation  -     X-Ray Chest PA And Lateral; Future    SHARATH (obstructive sleep apnea)    Tobacco use disorder, moderate, dependence    Morbid obesity with BMI of 60.0-69.9, adult    Mild protein malnutrition    Type 2 diabetes mellitus with stage 3 chronic kidney disease, with long-term current use of insulin    Hyperlipidemia, unspecified hyperlipidemia type    Essential hypertension    Recurrent major depressive disorder, in full remission    Uncomplicated opioid dependence    Idiopathic intracranial hypertension    Spinal stenosis of lumbar region, unspecified whether neurogenic claudication present    Migraine without aura and without status migrainosus, not intractable    Nonintractable epilepsy without status epilepticus, unspecified epilepsy type      1.  Just finished doxycycline  2.  Will consider augmentin  3.  Will wait to see results of cxr to determine treatment options  4.  Follow up with pulmonary as scheduled  5.  Follow up with neurology for ICH and epilepsy   6.  Pain management for spinal stenosis      MD Carlyle Conde MD

## 2019-04-02 DIAGNOSIS — G89.29 CHRONIC NONINTRACTABLE HEADACHE, UNSPECIFIED HEADACHE TYPE: ICD-10-CM

## 2019-04-02 DIAGNOSIS — G93.2 IIH (IDIOPATHIC INTRACRANIAL HYPERTENSION): ICD-10-CM

## 2019-04-02 DIAGNOSIS — R51.9 CHRONIC NONINTRACTABLE HEADACHE, UNSPECIFIED HEADACHE TYPE: ICD-10-CM

## 2019-04-02 RX ORDER — VENLAFAXINE HYDROCHLORIDE 75 MG/1
CAPSULE, EXTENDED RELEASE ORAL
Qty: 90 CAPSULE | Refills: 3 | Status: SHIPPED | OUTPATIENT
Start: 2019-04-02 | End: 2019-12-30

## 2019-04-02 RX ORDER — TOPIRAMATE 100 MG/1
TABLET, FILM COATED ORAL
Qty: 180 TABLET | Refills: 11 | Status: SHIPPED | OUTPATIENT
Start: 2019-04-02 | End: 2020-08-19

## 2019-04-04 RX ORDER — LOSARTAN POTASSIUM 100 MG/1
100 TABLET ORAL DAILY
Qty: 90 TABLET | Refills: 0 | Status: SHIPPED | OUTPATIENT
Start: 2019-04-04 | End: 2019-07-01 | Stop reason: SDUPTHER

## 2019-04-04 RX ORDER — METFORMIN HYDROCHLORIDE 500 MG/1
1000 TABLET, EXTENDED RELEASE ORAL 2 TIMES DAILY WITH MEALS
Qty: 360 TABLET | Refills: 0 | Status: SHIPPED | OUTPATIENT
Start: 2019-04-04 | End: 2019-07-01 | Stop reason: SDUPTHER

## 2019-05-03 NOTE — PROGRESS NOTES
Spoke with pt. Pt c/o bilateral foot pain. Pt has dx of plantar fasciitis. Pt is taking Norco for pain. Pt states the pain is worse today. Pt is walking around a furniture store. Pt encouraged to use ice as directed by MD. Pt states she has not been contacted by [facility] regarding her diabetic shoes. Spoke to Kristi with [facility]. She states she is waiting for a signature, time and date from pt's PCP. She will refax today. Message sent to Dr. Gordillo.     Interventions performed:   Contact [facility]  Facilitate diabetic shoes.     Plan:   Continue diabetic education  Follow up on diabetic shoes.    WDL

## 2019-05-07 ENCOUNTER — OFFICE VISIT (OUTPATIENT)
Dept: PODIATRY | Facility: CLINIC | Age: 49
End: 2019-05-07
Payer: MEDICARE

## 2019-05-07 VITALS — HEIGHT: 64 IN | WEIGHT: 293 LBS | BODY MASS INDEX: 50.02 KG/M2

## 2019-05-07 DIAGNOSIS — G89.29 CHRONIC HEEL PAIN, UNSPECIFIED LATERALITY: ICD-10-CM

## 2019-05-07 DIAGNOSIS — E11.49 TYPE II DIABETES MELLITUS WITH NEUROLOGICAL MANIFESTATIONS: Primary | ICD-10-CM

## 2019-05-07 DIAGNOSIS — M79.672 CHRONIC HEEL PAIN, LEFT: ICD-10-CM

## 2019-05-07 DIAGNOSIS — M79.673 CHRONIC HEEL PAIN, UNSPECIFIED LATERALITY: ICD-10-CM

## 2019-05-07 DIAGNOSIS — G89.29 CHRONIC HEEL PAIN, LEFT: ICD-10-CM

## 2019-05-07 PROCEDURE — 99213 OFFICE O/P EST LOW 20 MIN: CPT | Mod: HCNC,S$GLB,, | Performed by: PODIATRIST

## 2019-05-07 PROCEDURE — 99999 PR PBB SHADOW E&M-EST. PATIENT-LVL IV: ICD-10-PCS | Mod: PBBFAC,HCNC,, | Performed by: PODIATRIST

## 2019-05-07 PROCEDURE — 3044F HG A1C LEVEL LT 7.0%: CPT | Mod: HCNC,CPTII,S$GLB, | Performed by: PODIATRIST

## 2019-05-07 PROCEDURE — 99213 PR OFFICE/OUTPT VISIT, EST, LEVL III, 20-29 MIN: ICD-10-PCS | Mod: HCNC,S$GLB,, | Performed by: PODIATRIST

## 2019-05-07 PROCEDURE — 99999 PR PBB SHADOW E&M-EST. PATIENT-LVL IV: CPT | Mod: PBBFAC,HCNC,, | Performed by: PODIATRIST

## 2019-05-07 PROCEDURE — 3044F PR MOST RECENT HEMOGLOBIN A1C LEVEL <7.0%: ICD-10-PCS | Mod: HCNC,CPTII,S$GLB, | Performed by: PODIATRIST

## 2019-05-07 PROCEDURE — 3008F BODY MASS INDEX DOCD: CPT | Mod: HCNC,CPTII,S$GLB, | Performed by: PODIATRIST

## 2019-05-07 PROCEDURE — 3008F PR BODY MASS INDEX (BMI) DOCUMENTED: ICD-10-PCS | Mod: HCNC,CPTII,S$GLB, | Performed by: PODIATRIST

## 2019-05-07 RX ORDER — DICLOFENAC SODIUM 10 MG/G
2 GEL TOPICAL DAILY
Qty: 100 G | Refills: 3 | Status: SHIPPED | OUTPATIENT
Start: 2019-05-07 | End: 2020-03-22 | Stop reason: SDUPTHER

## 2019-05-07 NOTE — PROGRESS NOTES
Subjective:      Patient ID: Yanin Kaiser is a 48 y.o. female.    Chief Complaint: Diabetes Mellitus (bialteral top inside of foot pain (PCP  Page 3/28/19)); Diabetic Foot Exam; and Nail Care    Yanni Kaiser is a 48 y.o. femalewho presents to the clinic for evaluation and treatment of high risk feet. Yanni Kaiser   has a past medical history of Allergy, Anemia, Asthma, Cigarette smoker, Depression, Diabetes with neurologic complications, GERD (gastroesophageal reflux disease), High cholesterol, Hyperprolactinemia, Hypertension, Obese body habitus, Pseudotumor cerebri, Seizures, Simple endometrial hyperplasia (2018), Sleep apnea, and Smoker.   The patient's chief complaint is the feeling of swelling to the feet and continuedright heel pain.     8/8/18: Presents for worsening LEFT heel pain. Reports pain to right heel has improved. Currently on percocet prescribed by pain management.     9/20/18: Reports pain is 20% improved to left heel. States she has started wearing tennis shoes. S/p injection LEFT heel. Reports pain to right heel well controlled.     10/30/18: Reports Continued pain to B/L heel L>R. Completed one session of physical therapy however was not able to f/u as her  had recent surgery. Also reports discoloration of skin to site of prior left heel injection.     12/26/18: Reports continued left heel pain 8/10. Has not been able to go to PT due to personal issues. Presents today in diabetic she states are 4 years old. Also reports discoloration to skin right ankle.     3/26/19: Reports continued pain to left heel. Declines PT. Presents in diabetic shoes once again that are 2 years old.     5/7/19: Presents for f/u continued pain to left heel. Has not obtained DM shoes. Declines PT.     PCP: Carlyle Gordillo MD    Date Last Seen by PCP:   Chief Complaint   Patient presents with    Diabetes Mellitus     bialteral top inside of foot pain (PCP  Page 3/28/19)    Diabetic  Foot Exam    Nail Care       Current shoe gear:   champions boat shoes with inserts     This patient has documented high risk feet requiring routine maintenance secondary to diabetes mellitis and those secondary complications of diabetes, as mentioned..      Hemoglobin A1C   Date Value Ref Range Status   03/20/2019 6.5 (H) 4.0 - 5.6 % Final     Comment:     ADA Screening Guidelines:  5.7-6.4%  Consistent with prediabetes  >or=6.5%  Consistent with diabetes  High levels of fetal hemoglobin interfere with the HbA1C  assay. Heterozygous hemoglobin variants (HbS, HgC, etc)do  not significantly interfere with this assay.   However, presence of multiple variants may affect accuracy.     07/30/2018 5.8 (H) 4.0 - 5.6 % Final     Comment:     ADA Screening Guidelines:  5.7-6.4%  Consistent with prediabetes  >or=6.5%  Consistent with diabetes  High levels of fetal hemoglobin interfere with the HbA1C  assay. Heterozygous hemoglobin variants (HbS, HgC, etc)do  not significantly interfere with this assay.   However, presence of multiple variants may affect accuracy.     01/22/2018 5.8 (H) 4.0 - 5.6 % Final     Comment:     According to ADA guidelines, hemoglobin A1c <7.0% represents  optimal control in non-pregnant diabetic patients. Different  metrics may apply to specific patient populations.   Standards of Medical Care in Diabetes-2016.  For the purpose of screening for the presence of diabetes:  <5.7%     Consistent with the absence of diabetes  5.7-6.4%  Consistent with increasing risk for diabetes   (prediabetes)  >or=6.5%  Consistent with diabetes  Currently, no consensus exists for use of hemoglobin A1c  for diagnosis of diabetes for children.  This Hemoglobin A1c assay has significant interference with fetal   hemoglobin   (HbF). The results are invalid for patients with abnormal amounts of   HbF,   including those with known Hereditary Persistence   of Fetal Hemoglobin. Heterozygous hemoglobin variants (HbAS, HbAC,    HbAD, HbAE, HbA2) do not significantly interfere with this assay;   however, presence of multiple variants in a sample may impact the %   interference.       Patient Active Problem List   Diagnosis    SHARATH (obstructive sleep apnea)    Leg varices    Low back pain    Migraine    Morbid obesity with BMI of 60.0-69.9, adult    Tobacco use disorder, moderate, dependence    Anemia of chronic disease    Mild protein malnutrition    Weakness    Allergic rhinitis    Idiopathic intracranial hypertension    Essential hypertension    Combined hyperlipidemia associated with type 2 diabetes mellitus    Hyperlipidemia    GERD (gastroesophageal reflux disease)    Epilepsy    Plantar fasciitis, bilateral    Muscle tightness    Muscle weakness    Type 2 diabetes mellitus with stage 3 chronic kidney disease, with long-term current use of insulin    Recurrent major depressive disorder, in full remission    Other chronic pain    Suicide attempt    Opioid dependence    Lumbar stenosis    Chest pain    Foot pain, bilateral    Antalgic gait    Dysfunctional uterine bleeding    DUB (dysfunctional uterine bleeding)    S/P D&C (status post dilation and curettage)    Simple endometrial hyperplasia    Anemia, iron deficiency    Refractive error     Current Outpatient Medications on File Prior to Visit   Medication Sig Dispense Refill    acetaZOLAMIDE (DIAMOX) 500 mg CpSR Take 1 capsule (500 mg total) by mouth 2 (two) times daily. 360 capsule 1    albuterol (PROVENTIL/VENTOLIN HFA) 90 mcg/actuation inhaler INHALE 2 PUFFS BY MOUTH EVERY 6 HOURS AS NEEDED FOR WHEEZING 54 g 0    albuterol-ipratropium (DUO-NEB) 2.5 mg-0.5 mg/3 mL nebulizer solution Take 3 mLs by nebulization every 6 (six) hours as needed for Wheezing or Shortness of Breath. Rescue 100 mL 3    blood sugar diagnostic Strp 1 each by Misc.(Non-Drug; Combo Route) route 4 (four) times daily before meals and nightly. ACCU CHEK ELVIA PLUS METER 200  each 3    blood-glucose meter (ACCU-CHEK ELVIA PLUS METER) Misc TEST FOUR TIMES DAILY BEFORE MEALS AND EVERY NIGHT 90 each 1    budesonide-formoterol 160-4.5 mcg (SYMBICORT) 160-4.5 mcg/actuation HFAA Inhale 2 puffs into the lungs every 12 (twelve) hours. Controller 1 Inhaler 5    capsaicin 0.1 % Crea Apply 1 application topically 2 (two) times daily. 56.6 g 1    fluticasone (FLONASE) 50 mcg/actuation nasal spray 1 spray (50 mcg total) by Each Nare route once daily. 15.8 mL 2    GAVILYTE-G 236-22.74-6.74 -5.86 gram suspension   0    hydrOXYzine (ATARAX) 50 MG tablet Take 1-4 at night for itching. 120 tablet 3    lancets (ACCU-CHEK SOFTCLIX LANCETS) Misc 1 Device by Misc.(Non-Drug; Combo Route) route 4 (four) times daily. 200 each 3    levocetirizine (XYZAL) 5 MG tablet Take 1 tablet (5 mg total) by mouth every evening. 30 tablet 11    linaclotide (LINZESS) 290 mcg Cap Take 1 capsule (290 mcg total) by mouth once daily. 90 capsule 3    losartan (COZAAR) 100 MG tablet Take 1 tablet (100 mg total) by mouth once daily. 90 tablet 0    medroxyPROGESTERone (PROVERA) 10 MG tablet Take 1 tablet (10 mg total) by mouth once daily. TAKE DAY 1-12 Q MONTH 12 tablet 12    meloxicam (MOBIC) 15 MG tablet TAKE 1 TABLET(15 MG) BY MOUTH DAILY AS NEEDED FOR HEADACHE 90 tablet 1    metFORMIN (GLUCOPHAGE-XR) 500 MG 24 hr tablet Take 2 tablets (1,000 mg total) by mouth 2 (two) times daily with meals. 360 tablet 0    metroNIDAZOLE (FLAGYL) 500 MG tablet Take 1 tablet (500 mg total) by mouth 4 (four) times daily. Home medication      mometasone 0.1% (ELOCON) 0.1 % cream       montelukast (SINGULAIR) 10 mg tablet TAKE 1 TABLET(10 MG) BY MOUTH EVERY EVENING 30 tablet 0    nicotine polacrilex 2 MG Lozg Take 1 lozenge (2 mg total) by mouth as needed. 168 lozenge 0    ondansetron (ZOFRAN) 8 MG tablet Take 1 tablet (8 mg total) by mouth every 8 (eight) hours as needed for Nausea. 90 tablet 5    oxyCODONE-acetaminophen (PERCOCET)   mg per tablet TAKE 1 TABLET BY MOUTH EVERY 8 HOURS AS NEEDED. MAY CAUSE DROWSINESS  0    QUEtiapine (SEROQUEL) 25 MG Tab TAKE 1 TABLET(25 MG) BY MOUTH EVERY EVENING 90 tablet 0    sumatriptan (IMITREX) 50 MG tablet Take 1 tab at onset of headaches.  If no improvement in 2 hours, take another.  Do not take more than 2 tabs in 24 hours. 9 tablet 5    tiotropium (SPIRIVA) 18 mcg inhalation capsule Inhale 1 capsule (18 mcg total) into the lungs once daily. Controller 30 capsule 11    tiZANidine (ZANAFLEX) 4 MG tablet Take 4 mg by mouth every 8 (eight) hours.       topiramate (TOPAMAX) 100 MG tablet TAKE 3 TABLETS BY MOUTH TWICE DAILY 180 tablet 11    venlafaxine (EFFEXOR-XR) 75 MG 24 hr capsule TAKE 1 CAPSULE(75 MG) BY MOUTH EVERY EVENING 90 capsule 3    hydroCHLOROthiazide (HYDRODIURIL) 25 MG tablet Take 1 tablet (25 mg total) by mouth once daily. 30 tablet 11    pantoprazole (PROTONIX) 40 MG tablet Take 1 tablet (40 mg total) by mouth 2 (two) times daily. 180 tablet 3     No current facility-administered medications on file prior to visit.      Review of patient's allergies indicates:  No Known Allergies  Past Surgical History:   Procedure Laterality Date    BREAST CYST ASPIRATION       SECTION      X 1    CHOLECYSTECTOMY      COLONOSCOPY      COLONOSCOPY N/A 2019    Performed by [facility] at [facility] (2ND F[facility])    EGD (ESOPHAGOGASTRODUODENOSCOPY) N/A 2019    Performed by [facility] at [facility] ENDO (2ND FLR)    HYSTEROSCOPY, WITH DILATION AND CURETTAGE OF UTERUS N/A 10/16/2018    Performed by Glenn Black Jr., [facility] at [facility] [place]    RETINAL LASER PROCEDURE Left     SINUS SURGERY      UPPER GASTROINTESTINAL ENDOSCOPY       Family History   Problem Relation Age of Onset    Hypertension Mother     Diabetes Mother     Colon cancer Mother 50    Colon polyps Mother     Heart disease Father     COPD Father     Heart attack Father     Hypertension Father      Ulcers Father     Cancer Maternal Grandmother     Breast cancer Maternal Grandmother     Colon cancer Maternal Grandmother     Colon polyps Maternal Grandmother     No Known Problems Paternal Grandmother     No Known Problems Sister     No Known Problems Brother     No Known Problems Maternal Aunt     No Known Problems Maternal Uncle     No Known Problems Paternal Aunt     No Known Problems Paternal Uncle     No Known Problems Maternal Grandfather     No Known Problems Paternal Grandfather     Celiac disease Neg Hx     Cirrhosis Neg Hx     Crohn's disease Neg Hx     Cystic fibrosis Neg Hx     Esophageal cancer Neg Hx     Hemochromatosis Neg Hx     Inflammatory bowel disease Neg Hx     Irritable bowel syndrome Neg Hx     Liver cancer Neg Hx     Liver disease Neg Hx     Rectal cancer Neg Hx     Stomach cancer Neg Hx     Ulcerative colitis Neg Hx     Jonnie's disease Neg Hx     Tuberculosis Neg Hx     Lymphoma Neg Hx     Scleroderma Neg Hx     Rheum arthritis Neg Hx     Melanoma Neg Hx     Multiple sclerosis Neg Hx     Psoriasis Neg Hx     Lupus Neg Hx     Skin cancer Neg Hx     Amblyopia Neg Hx     Blindness Neg Hx     Cataracts Neg Hx     Glaucoma Neg Hx     Macular degeneration Neg Hx     Retinal detachment Neg Hx     Strabismus Neg Hx     Stroke Neg Hx     Thyroid disease Neg Hx      Social History     Socioeconomic History    Marital status:      Spouse name: Not on file    Number of children: Not on file    Years of education: Not on file    Highest education level: Not on file   Occupational History    Not on file   Social Needs    Financial resource strain: Not on file    Food insecurity:     Worry: Not on file     Inability: Not on file    Transportation needs:     Medical: Not on file     Non-medical: Not on file   Tobacco Use    Smoking status: Current Every Day Smoker     Packs/day: 1.00     Years: 15.00     Pack years: 15.00     Types: Cigarettes  "   Smokeless tobacco: Never Used    Tobacco comment: 1 pack every other day   Substance and Sexual Activity    Alcohol use: No     Alcohol/week: 0.0 oz    Drug use: No    Sexual activity: Yes     Partners: Male     Birth control/protection: None   Lifestyle    Physical activity:     Days per week: Not on file     Minutes per session: Not on file    Stress: Not on file   Relationships    Social connections:     Talks on phone: Not on file     Gets together: Not on file     Attends Rastafari service: Not on file     Active member of club or organization: Not on file     Attends meetings of clubs or organizations: Not on file     Relationship status: Not on file   Other Topics Concern    Not on file   Social History Narrative    Not on file        Review of Systems   Constitution: Negative for chills and fever.   Cardiovascular: Positive for leg swelling. Negative for chest pain and claudication.   Respiratory: Negative for cough and shortness of breath.    Skin: Positive for dry skin and nail changes. Negative for itching and rash.   Musculoskeletal: Positive for arthritis, joint pain, muscle cramps and myalgias. Negative for falls, joint swelling and muscle weakness.   Gastrointestinal: Negative for diarrhea, nausea and vomiting.   Neurological: Positive for numbness and paresthesias. Negative for tremors and weakness.   Psychiatric/Behavioral: Negative for altered mental status and hallucinations.           Objective:       Vitals:    05/07/19 1035   Weight: (!) 165.1 kg (364 lb)   Height: 5' 4" (1.626 m)   PainSc:   8       Physical Exam   Constitutional:   General: Pt. is well-developed, well-nourished, appears stated age, in no acute distress, alert and oriented x 3. No evidence of depression, anxiety, or agitation. Calm, cooperative, and communicative. Appropriate interactions and affect.       Cardiovascular:   Pulses:       Dorsalis pedis pulses are 1+ on the right side, and 1+ on the left side.      "   Posterior tibial pulses are 1+ on the right side, and 1+ on the left side.   Dorsalis pedis and posterior tibial pulses are diminished Bilaterally. Toes are cool to touch. Feet are warm proximally.There is decreased digital hair. Skin is atrophic, hyperpigmented, and mildly edematous       Musculoskeletal:        Right ankle: She exhibits decreased range of motion and swelling. No tenderness. No lateral malleolus, no medial malleolus, no CF ligament, no head of 5th metatarsal and no proximal fibula tenderness found. Achilles tendon exhibits no pain and no defect.        Left ankle: She exhibits decreased range of motion and swelling. No tenderness. No lateral malleolus, no medial malleolus, no head of 5th metatarsal and no proximal fibula tenderness found. Achilles tendon exhibits no pain and no defect.        Right foot: There is tenderness (Plantar medial calcaneal tuber. mild pain to palpation of 5th met hase and resistance to eversion). There is normal range of motion.        Left foot: There is tenderness (plantar calcaneal medial tuber. mild pain with palpation of 5th met base and eversion resistance. ). There is normal range of motion.   Biomechanical exam: Pain on palpation bilateral medial calcaneal tubercle at origin of plantar fascia. L>R,  There is equinus deformity bilateral with decreased dorsiflexion at the ankle joint bilateral. No tenderness with compression of heel. Negative tinels sign.     Muscle strength is 5/5 in all groups bilaterally.    Decreased stride, station of gait.  apropulsive toe off.  Increased angle and base of gait.    Patient has hammertoes of digits 2-5 bilateral partially reducible without symptom today.    Decreased first MPJ range of motion both weightbearing and nonweightbearing, no crepitus observed the first MP joint,+ dorsal flag sign. Mild  bunion deformity is observed .    Decreased arch height noted b/l.    Neurological: A sensory deficit is present.    [place]-Arjun 5.07 monofilamant testing is diminished Maciej feet. Sharp/dull sensation diminished Bilaterally.     Paresthesias, and hyperesthesia bilateral feet with no clearly identified trigger or source.     Skin: Skin is warm, dry and intact. No abrasion, no ecchymosis, no lesion and no rash noted. She is not diaphoretic. No cyanosis or erythema. No pallor. Nails show no clubbing.   Toenails 1-5 bilaterally neatly trimmed and painted thickened by 2-3 mm,     Interdigital Spaces clean, dry and without evidence of break in skin integrity      Hyperpigmentation noted to left medial heel. No ulceration noted. No drainage noted.    Psychiatric: She has a normal mood and affect. Her speech is normal.   Nursing note and vitals reviewed.            Assessment:       Encounter Diagnoses   Name Primary?    Type II diabetes mellitus with neurological manifestations Yes    Chronic heel pain, unspecified laterality     Chronic heel pain, left          Plan:       Yanni was seen today for diabetes mellitus, diabetic foot exam and nail care.    Diagnoses and all orders for this visit:    Type II diabetes mellitus with neurological manifestations  -     MRI Foot ([facility]) Left Without Contrast; Future    Chronic heel pain, unspecified laterality    Chronic heel pain, left  -     MRI Foot ([facility]) Left Without Contrast; Future    Other orders  -     diclofenac sodium (VOLTAREN) 1 % Gel; Apply 2 g topically once daily.      I counseled the patient on her conditions, their implications and medical management.     Declines PT, CAM boot., injection.     MRI left foot ordered to eval for plantar fasciitis.     Declines surgical intervention currently.     Rx diabetic shoes with custom molded inserts to be worn at all times while ambulating. Prescription provided with list of local retailers.     - Shoe inspection. Diabetic Foot Education. Patient reminded of the importance of good nutrition and blood sugar control to  help prevent podiatric complications of diabetes. Patient instructed on proper foot hygeine. We discussed wearing proper shoe gear, daily foot inspections, never walking without protective shoe gear, never putting sharp instruments to feet.     Discussed different treatment options for foot pain. I gave written and verbal instructions on stretching exercises. Patient expressed understanding. Discussed icing the affected area as needed and also wearing appropriate shoe gear and avoiding flats, slippers, sandals, and going barefoot. My recommendation for OTC supports is [employer]. Patient instructed on adequate icing techniques. Patient should ice the affected area at least once per day x 10 minutes for 10 days . I advised the patient that extra icing would also be beneficial to ensure adequate anti inflammatory effect. We also discussed cortisone injections and NSAID therapy.       Rx Voltaren gel to be applied to affected area up to 3-4 x daily as needed for pain      RTC in 6 weeks  sooner PRN with Dr. Salas

## 2019-05-10 ENCOUNTER — OFFICE VISIT (OUTPATIENT)
Dept: OTOLARYNGOLOGY | Facility: CLINIC | Age: 49
End: 2019-05-10
Payer: MEDICARE

## 2019-05-10 VITALS
HEIGHT: 64 IN | DIASTOLIC BLOOD PRESSURE: 74 MMHG | SYSTOLIC BLOOD PRESSURE: 122 MMHG | WEIGHT: 293 LBS | BODY MASS INDEX: 50.02 KG/M2

## 2019-05-10 DIAGNOSIS — J32.9 CHRONIC SINUSITIS, UNSPECIFIED LOCATION: Primary | ICD-10-CM

## 2019-05-10 PROCEDURE — 99204 OFFICE O/P NEW MOD 45 MIN: CPT | Mod: S$GLB,,, | Performed by: OTOLARYNGOLOGY

## 2019-05-10 PROCEDURE — 3078F PR MOST RECENT DIASTOLIC BLOOD PRESSURE < 80 MM HG: ICD-10-PCS | Mod: CPTII,S$GLB,, | Performed by: OTOLARYNGOLOGY

## 2019-05-10 PROCEDURE — 3074F SYST BP LT 130 MM HG: CPT | Mod: CPTII,S$GLB,, | Performed by: OTOLARYNGOLOGY

## 2019-05-10 PROCEDURE — 99204 PR OFFICE/OUTPT VISIT, NEW, LEVL IV, 45-59 MIN: ICD-10-PCS | Mod: S$GLB,,, | Performed by: OTOLARYNGOLOGY

## 2019-05-10 PROCEDURE — 3074F PR MOST RECENT SYSTOLIC BLOOD PRESSURE < 130 MM HG: ICD-10-PCS | Mod: CPTII,S$GLB,, | Performed by: OTOLARYNGOLOGY

## 2019-05-10 PROCEDURE — 3078F DIAST BP <80 MM HG: CPT | Mod: CPTII,S$GLB,, | Performed by: OTOLARYNGOLOGY

## 2019-05-10 PROCEDURE — 3008F BODY MASS INDEX DOCD: CPT | Mod: CPTII,S$GLB,, | Performed by: OTOLARYNGOLOGY

## 2019-05-10 PROCEDURE — 3008F PR BODY MASS INDEX (BMI) DOCUMENTED: ICD-10-PCS | Mod: CPTII,S$GLB,, | Performed by: OTOLARYNGOLOGY

## 2019-05-10 NOTE — LETTER
May 20, 2019      Noe Prado, NP  [address]  [facility] [address]           [facility]  [address] [facility] [address]  [place] [address]  Phone: [phone]          Patient: Yanni Kaiser   MR Number: [id number]   YOB: 1970   Date of Visit: 5/10/2019       Dear Noe Prado:    Thank you for referring Yanni Kaiser to me for evaluation. Attached you will find relevant portions of my assessment and plan of care.    If you have questions, please do not hesitate to call me. I look forward to following Yanni Kaiser along with you.    Sincerely,    Gayle Mercado MD    Enclosure  CC:  No Recipients    If you would like to receive this communication electronically, please contact [email or web address] or [phone] to request more information on [employer] Link access.    For providers and/or their staff who would like to refer a patient to [facility], please contact us through our one-stop-shop provider referral line, [facility], at [phone].    If you feel you have received this communication in error or would no longer like to receive these types of communications, please e-mail [email or web address]

## 2019-05-13 ENCOUNTER — HOSPITAL ENCOUNTER (OUTPATIENT)
Dept: RADIOLOGY | Facility: HOSPITAL | Age: 49
Discharge: HOME | End: 2019-05-13
Attending: OTOLARYNGOLOGY
Payer: MEDICARE

## 2019-05-13 DIAGNOSIS — J32.9 CHRONIC SINUSITIS, UNSPECIFIED LOCATION: ICD-10-CM

## 2019-05-13 PROCEDURE — 70486 CT MEDTRONIC SINUSES WITHOUT: ICD-10-PCS | Mod: 26,HCNC,, | Performed by: RADIOLOGY

## 2019-05-13 PROCEDURE — 70486 CT MAXILLOFACIAL W/O DYE: CPT | Mod: 26,HCNC,, | Performed by: RADIOLOGY

## 2019-05-13 PROCEDURE — 70486 CT MAXILLOFACIAL W/O DYE: CPT | Mod: TC,HCNC

## 2019-05-17 ENCOUNTER — OFFICE VISIT (OUTPATIENT)
Dept: GASTROENTEROLOGY | Facility: CLINIC | Age: 49
End: 2019-05-17
Payer: MEDICARE

## 2019-05-17 ENCOUNTER — TELEPHONE (OUTPATIENT)
Dept: ENDOSCOPY | Facility: HOSPITAL | Age: 49
End: 2019-05-17

## 2019-05-17 ENCOUNTER — LAB VISIT (OUTPATIENT)
Dept: LAB | Facility: HOSPITAL | Age: 49
End: 2019-05-17
Attending: INTERNAL MEDICINE
Payer: MEDICARE

## 2019-05-17 ENCOUNTER — TELEPHONE (OUTPATIENT)
Dept: GASTROENTEROLOGY | Facility: CLINIC | Age: 49
End: 2019-05-17

## 2019-05-17 DIAGNOSIS — R13.10 DYSPHAGIA, UNSPECIFIED TYPE: ICD-10-CM

## 2019-05-17 DIAGNOSIS — R11.2 NAUSEA AND VOMITING, INTRACTABILITY OF VOMITING NOT SPECIFIED, UNSPECIFIED VOMITING TYPE: ICD-10-CM

## 2019-05-17 DIAGNOSIS — R14.0 BLOATING: ICD-10-CM

## 2019-05-17 DIAGNOSIS — A04.8 H. PYLORI INFECTION: ICD-10-CM

## 2019-05-17 DIAGNOSIS — K21.9 GASTROESOPHAGEAL REFLUX DISEASE WITHOUT ESOPHAGITIS: ICD-10-CM

## 2019-05-17 DIAGNOSIS — R10.9 ABDOMINAL PAIN, UNSPECIFIED ABDOMINAL LOCATION: ICD-10-CM

## 2019-05-17 DIAGNOSIS — K59.00 CONSTIPATION, UNSPECIFIED CONSTIPATION TYPE: ICD-10-CM

## 2019-05-17 DIAGNOSIS — R13.10 DYSPHAGIA, UNSPECIFIED TYPE: Primary | ICD-10-CM

## 2019-05-17 DIAGNOSIS — D50.9 IRON DEFICIENCY ANEMIA, UNSPECIFIED IRON DEFICIENCY ANEMIA TYPE: ICD-10-CM

## 2019-05-17 DIAGNOSIS — K21.9 GASTROESOPHAGEAL REFLUX DISEASE, ESOPHAGITIS PRESENCE NOT SPECIFIED: Primary | ICD-10-CM

## 2019-05-17 LAB
BASOPHILS # BLD AUTO: 0.07 K/UL (ref 0–0.2)
BASOPHILS NFR BLD: 1.5 % (ref 0–1.9)
DIFFERENTIAL METHOD: ABNORMAL
EOSINOPHIL # BLD AUTO: 0.6 K/UL (ref 0–0.5)
EOSINOPHIL NFR BLD: 11.9 % (ref 0–8)
ERYTHROCYTE [DISTWIDTH] IN BLOOD BY AUTOMATED COUNT: 14.5 % (ref 11.5–14.5)
FERRITIN SERPL-MCNC: 9 NG/ML (ref 20–300)
HCT VFR BLD AUTO: 40.2 % (ref 37–48.5)
HGB BLD-MCNC: 12.3 G/DL (ref 12–16)
IMM GRANULOCYTES # BLD AUTO: 0.01 K/UL (ref 0–0.04)
IMM GRANULOCYTES NFR BLD AUTO: 0.2 % (ref 0–0.5)
IRON SERPL-MCNC: 36 UG/DL (ref 30–160)
LYMPHOCYTES # BLD AUTO: 1.8 K/UL (ref 1–4.8)
LYMPHOCYTES NFR BLD: 39.7 % (ref 18–48)
MCH RBC QN AUTO: 27.8 PG (ref 27–31)
MCHC RBC AUTO-ENTMCNC: 30.6 G/DL (ref 32–36)
MCV RBC AUTO: 91 FL (ref 82–98)
MONOCYTES # BLD AUTO: 0.6 K/UL (ref 0.3–1)
MONOCYTES NFR BLD: 12.8 % (ref 4–15)
NEUTROPHILS # BLD AUTO: 1.6 K/UL (ref 1.8–7.7)
NEUTROPHILS NFR BLD: 33.9 % (ref 38–73)
NRBC BLD-RTO: 0 /100 WBC
PLATELET # BLD AUTO: 281 K/UL (ref 150–350)
PMV BLD AUTO: 8.7 FL (ref 9.2–12.9)
PREALB SERPL-MCNC: 14 MG/DL (ref 20–43)
RBC # BLD AUTO: 4.43 M/UL (ref 4–5.4)
SATURATED IRON: 10 % (ref 20–50)
TOTAL IRON BINDING CAPACITY: 366 UG/DL (ref 250–450)
TRANSFERRIN SERPL-MCNC: 247 MG/DL (ref 200–375)
WBC # BLD AUTO: 4.61 K/UL (ref 3.9–12.7)

## 2019-05-17 PROCEDURE — 99999 PR PBB SHADOW E&M-EST. PATIENT-LVL V: CPT | Mod: PBBFAC,HCNC,, | Performed by: INTERNAL MEDICINE

## 2019-05-17 PROCEDURE — 99215 PR OFFICE/OUTPT VISIT, EST, LEVL V, 40-54 MIN: ICD-10-PCS | Mod: HCNC,S$GLB,, | Performed by: INTERNAL MEDICINE

## 2019-05-17 PROCEDURE — 82728 ASSAY OF FERRITIN: CPT | Mod: HCNC

## 2019-05-17 PROCEDURE — 84134 ASSAY OF PREALBUMIN: CPT | Mod: HCNC

## 2019-05-17 PROCEDURE — 99499 UNLISTED E&M SERVICE: CPT | Mod: HCNC,S$GLB,, | Performed by: INTERNAL MEDICINE

## 2019-05-17 PROCEDURE — 85025 COMPLETE CBC W/AUTO DIFF WBC: CPT | Mod: HCNC

## 2019-05-17 PROCEDURE — 99999 PR PBB SHADOW E&M-EST. PATIENT-LVL V: ICD-10-PCS | Mod: PBBFAC,HCNC,, | Performed by: INTERNAL MEDICINE

## 2019-05-17 PROCEDURE — 83540 ASSAY OF IRON: CPT | Mod: HCNC

## 2019-05-17 PROCEDURE — 36415 COLL VENOUS BLD VENIPUNCTURE: CPT | Mod: HCNC

## 2019-05-17 PROCEDURE — 99215 OFFICE O/P EST HI 40 MIN: CPT | Mod: HCNC,S$GLB,, | Performed by: INTERNAL MEDICINE

## 2019-05-17 PROCEDURE — 99499 RISK ADDL DX/OHS AUDIT: ICD-10-PCS | Mod: HCNC,S$GLB,, | Performed by: INTERNAL MEDICINE

## 2019-05-17 NOTE — PATIENT INSTRUCTIONS
-Try gaviscon with alginate.  Chew 1-2 tablets after meals and at bedtime as needed (up to 4x a day).  For best results follow by a half glass of water or other liquid.   Gaviscon is available in liquid formulation.  Take 1-4 tablespoons 4x a day for Regular Strength and 1-4 teaspoonfuls 4x a day for Extra Strength.  The special european formulation with alginate appears more effective and is usually available on [employer].  You can try regular Gaviscon if you are unable to find the one with alginate      -Try FDguard 1 tablet three times per day as needed for abdominal pain, nausea, satiety, vomiting.  You can purchase this over the counter. Look for coupons on line.   -Stop probiotic   -Follow up with sleep specialist to help improve your sleep hygiene and start treatment for sleep apnea     TESTS  -Esophageal manometry and  esophagogram  to evaluate esophageal function  -BRAVO Ph test off acid suppressing medication to check level of acidity in your esophagus  -Check  Stool test for h. Pylori off protonix for 4 weeks

## 2019-05-17 NOTE — TELEPHONE ENCOUNTER
MOTILITY CLINIC PROCEDURE ORDERS    CLEARANCE FOR PROCEDURES:  No needed     PROCEDURES  EGD with EndoFlip +/- Botox  Esophageal manometry with impedance - dysphagia   Esophageal manometry with impedance - belching   EGD with BRAVO 48 hours     FLOOR:  4th Floor  -manometry  2nd Floor - egd    Reason for 2nd Floor:   Gastroparesis   BMI>50    PREP  Full liquid diet 5 days     MEDICATIONS   pH Studies  OFF PPI/H2 Blocker     Motility Studies (esophageal manometry/anorectal manometry)  Hold Narcotics x3 days     ORDER OF TESTING:  Day 1: manometry/esophagogram  Day 2: EGD w BRAVO  (at least 5 days later)  Can separate  - pt lives locally

## 2019-05-17 NOTE — TELEPHONE ENCOUNTER
Contacted patient regarding scheduling esophageal manometry and egd procedures ordered. No answer. Message left  With my direct contact info provided for patient to return call to schedule procedures.

## 2019-05-17 NOTE — TELEPHONE ENCOUNTER
Attempted to schedule patient from lobby. Patient left, will contact patient to schedule procedures ordered.

## 2019-05-17 NOTE — PROGRESS NOTES
[facility] Consultation Note    Reason for Consult:    Chief Complaint   Patient presents with    Heartburn    Chest Pain    Nausea    Abdominal Pain    Gas    Bloated    Diarrhea    Constipation         PCP:   Carlyle Gordillo       Referring MD:  Flako Trejo Md  [address] [place] [address]  [address]  [facility]  [place] [address]    HPI:  Yanni Kaiser is a 48 y.o. female with a PMH of anemia, arthritis, back pain, plantar fascitis, asthma,  anxiety, MDD, DM2 , HTN, migraines, seizures, SHARATH referred to motility clinic for second opinion regarding the following problems:    GERD. Not doing well.   Taking protonix 40 mg BID  Retrosternal pyrosis:yes  Regurgitation:yes  Belching:yes  Onset: over 5 years  Frequency: every day    Chest pain. Still w bothersome chest pain and pressure  Frequency: every day  Improvement with belching  Cardiology jameson negative.  Stress test negative. Cards alvarez snot think this is cardiac     Dysphagia.  Still w difficulty swallowing.  Onset of symptoms:5 yrs  Problems with solids:mostly  Location: upper esophagus  Frequency:  Almost daily    Nausea.  Daily, w every meal   Early satiety. daily  No vomiting  Onset:over 5 years    Diet: trying to follow GP diet. Seen by dietitian     MEDS:   zofran once daily prn, which helps  Did not try FDguard      Gastroparesis diagnosed:  Possible 2016 with smartpill    Number of GP related hospitalization in the past year: none  Number of GP related ED visit in the past year: none    Curenlty on following medications that may affect gastric emptying:   Narcotics (morphine, oxycodone, tapentadol, less with tramadol):percocet 5-325 mg once weekly for planar fasciitis since 2/2018.  Tried to limit     DM 2. A1C increased 6.5 Possibly related to steroid use w asthma episode   Dx 10 year ago.   BS running up to 120s.   Previously followed by endocrinologist.   Taking metformin  1,000 mg BID    Abdominal pain.   LUQ pain resolved.   w hp treatmetn and Linzess   Now w mostly epigastric pain   Onset:over 5 yrs ago  Using narcotic pain medication: percocet  Nsaids: meloxicam 15 mg HS - have not been using   Has not tried FDgard.     Gas and bloating. Not better   No improvement with eliminating dairy.   Bloating: yes  Excessive gas: yes    Constipation. Improved.  Sometimes gets diarrhea   [place] 3 from 1  BM every 2-3 per week   Still feels that is not evacuating compete   5 years ago  Some  improvement with Linzess 290 mcg daily  Takes daily probiotics x 1 month.    Weight fluctuates.     H/o h. Pylori in 2016. Unsure if treated in 2016. Hp stool + 2018. Treated w doxy, flagyl, pepto and ppi x 14 days recently.  Completed the entire course. Pain improved somewhat, but not completely resoled.      Anemia. No over bleeding.  No vaginal bleeding. EGD/colon negative.    Diverticulitis on CT in 11/2018. Treated with abx. Subsequent colonoscopy okay. Maybe this made sx better     Fatty liver.     Family history of colon cancer. Mother dx in 50s, MGM dx ?    Anxiety. Depression.   Some improvement with seroquel 25 mg HS, also takes effexor 75 mg HS (for HAs).   Followed by PCP. Has seen psych in the past.     Insomnia. Not on meds. Diagnosed with SHARATH. Not on cpap/does not like it.     Migraines. Seizures. Some improvement with tompiramate 300 mg  BID, meloxicam 15 mg daily, imitrex 50 mg PRN, also taking effexor 75 mg HS, and aimovig once monthly inj. Per neruology.    Plantar fascitis. Taking percocet 5-325 mg once weekly since 2/2018. Was on a different narcotic for one month prior to this. Followed by pain management and podietry    Joint pain. Joint swelling. Knees. Hands. Back.  Seen by rheumatology w/ negative work up.    Denies diarrhea, BRBPR, melena    Total visit time was 40 minutes, more than 50% of which was spent in face-to-face counseling with patient regarding symptoms, diagnostic  results, prognosis, risks and benefits of treatment options, instructions for management, importance of compliance with chosen treatment options, risk factor reduction, stress reduction, coping strategies.      Previous Studies:   Colonoscopy 1/14/19: EPTC. Hemorrhoids. 3 mm cecal polyp (TA). Med lipoma 14 mm in AC. Recto sigmoid and cecal tics Two 3-5 mm SC polyps (hp). Multiple small polyps in rectum and recto sigmoid colon (hp). Rpt 5 yrs.   EGD 1/14/19: nl esophagus (-). Clear gastric fluid. Erosive gastropathy (-). Nl duodenum (-).   Ct a/p 11/6/18: Slight pericolonic fat stranding at the junction of the sigmoid and descending colon concerning for mild uncomplicated diverticulitis or epiploic appendagitis.Other incidental findings as above including cholecystectomy, mild focal fatty infiltration liver, tiny splenule, and osseous degenerative changes.  EGD 12/9/16: nl esophagus. Gastric erythema and granularity (gastritis + hpylori). Nl duodenum.   SmartPill 10/5/16: GET delayed 5 hr 50 min, sbtt nl 5 hr 8min, CTT nl 12 hr 13 min, SLBTT nl 19 hr 22 min, WGTT nl 25 hr 13 min  CT abd/pelvis 4/29/16: unremarkable  * colon 2016:?  Abd us 3/11/08: technically limited study. fatty liver. s/p nichole  Abd us 3/5/08: GB stones w/ + bullards sign.     Labs:  10/10/18: hgb low 11.6, RDW high 16.1, BMP unremarkable  8/8/18 : vit d low 12  7/11/18: TSH nl  6/19/18: hgb low 11.3, RDW high 16.8, cl high 113, albumin low 3.1  CCP ab IgG-  RF-  Taylor-  Sjogrens taylor -  Hep A ab IgG+  Hep c ab-  Hep b s ab-  Hep b c ab-  Hep b sag-  Quant gold tb -    Relevant Surgeries:  Cholecystectomy (3/7/2008)    * per pt report    ROS:  ROS   Constitutional: no fever, chills, night sweats, and weight loss  ENT: + congestion, rhinorrhea, and chronic sinus problems  CV: +Chest pain, no palpitations  Pulm: + cough, + shortness of breath  Ophtho: + blurry vision, + eye redness  GI: see HPI  Derm: No rash  Heme: No lymphadenopathy, + bruising  MSK:  + joint pain, + joint swelling, no Raynauds  : + dysuria, + frequent urination, no blood in urine  Endo: + hot or cold intolerance  Neuro: No dizziness, no syncope, no seizure  Psych: No anxiety, + depression        Medical History:   Past Medical History:   Diagnosis Date    Allergy     Anemia     Asthma     Cigarette smoker     Depression     Diabetes with neurologic complications     GERD (gastroesophageal reflux disease)     High cholesterol     Hyperprolactinemia     Hypertension     Obese body habitus     Pseudotumor cerebri     Seizures     Simple endometrial hyperplasia 2018    Sleep apnea     Smoker         Surgical History:   Past Surgical History:   Procedure Laterality Date    BREAST CYST ASPIRATION       SECTION      X 1    CHOLECYSTECTOMY      COLONOSCOPY      COLONOSCOPY N/A 2019    Performed by [facility] at [facility] (2ND FLR)    EGD (ESOPHAGOGASTRODUODENOSCOPY) N/A 2019    Per[facility] by [facility] at [facility] (2ND FLR)    HYSTEROSCOPY, WITH DILATION AND CURETTAGE OF UTERUS N/A 10/16/2018    [facility] by Glenn Black Jr., [facility] at [facility]    RETINAL LASER PROCEDURE Left     SINUS SURGERY      UPPER GASTROINTESTINAL ENDOSCOPY          Family History:   Family History   Problem Relation Age of Onset    Hypertension Mother     Diabetes Mother     Colon cancer Mother 50    Colon polyps Mother     Heart disease Father     COPD Father     Heart attack Father     Hypertension Father     Ulcers Father     Cancer Maternal Grandmother     Breast cancer Maternal Grandmother     Colon cancer Maternal Grandmother     Colon polyps Maternal Grandmother     No Known Problems Paternal Grandmother     No Known Problems Sister     No Known Problems Brother     No Known Problems Maternal Aunt     No Known Problems Maternal Uncle     No Known Problems Paternal Aunt     No Known Problems Paternal Uncle     No Known Problems Maternal  Grandfather     No Known Problems Paternal Grandfather     Celiac disease Neg Hx     Cirrhosis Neg Hx     Crohn's disease Neg Hx     Cystic fibrosis Neg Hx     Esophageal cancer Neg Hx     Hemochromatosis Neg Hx     Inflammatory bowel disease Neg Hx     Irritable bowel syndrome Neg Hx     Liver cancer Neg Hx     Liver disease Neg Hx     Rectal cancer Neg Hx     Stomach cancer Neg Hx     Ulcerative colitis Neg Hx     Jonnie's disease Neg Hx     Tuberculosis Neg Hx     Lymphoma Neg Hx     Scleroderma Neg Hx     Rheum arthritis Neg Hx     Melanoma Neg Hx     Multiple sclerosis Neg Hx     Psoriasis Neg Hx     Lupus Neg Hx     Skin cancer Neg Hx     Amblyopia Neg Hx     Blindness Neg Hx     Cataracts Neg Hx     Glaucoma Neg Hx     Macular degeneration Neg Hx     Retinal detachment Neg Hx     Strabismus Neg Hx     Stroke Neg Hx     Thyroid disease Neg Hx         Social History:   Social History     Socioeconomic History    Marital status:      Spouse name: Not on file    Number of children: Not on file    Years of education: Not on file    Highest education level: Not on file   Occupational History    Not on file   Social Needs    Financial resource strain: Not on file    Food insecurity:     Worry: Not on file     Inability: Not on file    Transportation needs:     Medical: Not on file     Non-medical: Not on file   Tobacco Use    Smoking status: Current Every Day Smoker     Packs/day: 1.00     Years: 15.00     Pack years: 15.00     Types: Cigarettes    Smokeless tobacco: Never Used    Tobacco comment: 1 pack every other day   Substance and Sexual Activity    Alcohol use: No     Alcohol/week: 0.0 oz    Drug use: No    Sexual activity: Yes     Partners: Male     Birth control/protection: None   Lifestyle    Physical activity:     Days per week: Not on file     Minutes per session: Not on file    Stress: Not on file   Relationships    Social connections:     Talks on  phone: Not on file     Gets together: Not on file     Attends Yarsani service: Not on file     Active member of club or organization: Not on file     Attends meetings of clubs or organizations: Not on file     Relationship status: Not on file   Other Topics Concern    Not on file   Social History Narrative    Not on file        Review of patient's allergies indicates:  No Known Allergies    Current Outpatient Medications   Medication Sig Dispense Refill    acetaZOLAMIDE (DIAMOX) 500 mg CpSR Take 1 capsule (500 mg total) by mouth 2 (two) times daily. 360 capsule 1    albuterol (PROVENTIL/VENTOLIN HFA) 90 mcg/actuation inhaler INHALE 2 PUFFS BY MOUTH EVERY 6 HOURS AS NEEDED FOR WHEEZING 54 g 0    albuterol-ipratropium (DUO-NEB) 2.5 mg-0.5 mg/3 mL nebulizer solution Take 3 mLs by nebulization every 6 (six) hours as needed for Wheezing or Shortness of Breath. Rescue 100 mL 3    blood sugar diagnostic Strp 1 each by Misc.(Non-Drug; Combo Route) route 4 (four) times daily before meals and nightly. ACCU CHEK ELVIA PLUS METER 200 each 3    blood-glucose meter (ACCU-CHEK ELVIA PLUS METER) Misc TEST FOUR TIMES DAILY BEFORE MEALS AND EVERY NIGHT 90 each 1    budesonide-formoterol 160-4.5 mcg (SYMBICORT) 160-4.5 mcg/actuation HFAA Inhale 2 puffs into the lungs every 12 (twelve) hours. Controller 1 Inhaler 5    capsaicin 0.1 % Crea Apply 1 application topically 2 (two) times daily. 56.6 g 1    diclofenac sodium (VOLTAREN) 1 % Gel Apply 2 g topically once daily. 100 g 3    fluticasone (FLONASE) 50 mcg/actuation nasal spray 1 spray (50 mcg total) by Each Nare route once daily. 15.8 mL 2    GAVILYTE-G 236-22.74-6.74 -5.86 gram suspension   0    hydroCHLOROthiazide (HYDRODIURIL) 25 MG tablet Take 1 tablet (25 mg total) by mouth once daily. 30 tablet 11    hydrOXYzine (ATARAX) 50 MG tablet Take 1-4 at night for itching. 120 tablet 3    lancets (ACCU-CHEK SOFTCLIX LANCETS) Misc 1 Device by Misc.(Non-Drug; Combo Route)  route 4 (four) times daily. 200 each 3    levocetirizine (XYZAL) 5 MG tablet Take 1 tablet (5 mg total) by mouth every evening. 30 tablet 11    linaclotide (LINZESS) 290 mcg Cap Take 1 capsule (290 mcg total) by mouth once daily. 90 capsule 3    losartan (COZAAR) 100 MG tablet Take 1 tablet (100 mg total) by mouth once daily. 90 tablet 0    medroxyPROGESTERone (PROVERA) 10 MG tablet Take 1 tablet (10 mg total) by mouth once daily. TAKE DAY 1-12 Q MONTH 12 tablet 12    meloxicam (MOBIC) 15 MG tablet TAKE 1 TABLET(15 MG) BY MOUTH DAILY AS NEEDED FOR HEADACHE 90 tablet 1    metFORMIN (GLUCOPHAGE-XR) 500 MG 24 hr tablet Take 2 tablets (1,000 mg total) by mouth 2 (two) times daily with meals. 360 tablet 0    metroNIDAZOLE (FLAGYL) 500 MG tablet Take 1 tablet (500 mg total) by mouth 4 (four) times daily. Home medication      mometasone 0.1% (ELOCON) 0.1 % cream       montelukast (SINGULAIR) 10 mg tablet TAKE 1 TABLET(10 MG) BY MOUTH EVERY EVENING 30 tablet 0    nicotine polacrilex 2 MG Lozg Take 1 lozenge (2 mg total) by mouth as needed. 168 lozenge 0    ondansetron (ZOFRAN) 8 MG tablet Take 1 tablet (8 mg total) by mouth every 8 (eight) hours as needed for Nausea. 90 tablet 5    oxyCODONE-acetaminophen (PERCOCET)  mg per tablet TAKE 1 TABLET BY MOUTH EVERY 8 HOURS AS NEEDED. MAY CAUSE DROWSINESS  0    pantoprazole (PROTONIX) 40 MG tablet Take 1 tablet (40 mg total) by mouth 2 (two) times daily. 180 tablet 3    QUEtiapine (SEROQUEL) 25 MG Tab TAKE 1 TABLET(25 MG) BY MOUTH EVERY EVENING 90 tablet 0    sumatriptan (IMITREX) 50 MG tablet Take 1 tab at onset of headaches.  If no improvement in 2 hours, take another.  Do not take more than 2 tabs in 24 hours. 9 tablet 5    tiotropium (SPIRIVA) 18 mcg inhalation capsule Inhale 1 capsule (18 mcg total) into the lungs once daily. Controller 30 capsule 11    tiZANidine (ZANAFLEX) 4 MG tablet Take 4 mg by mouth every 8 (eight) hours.       topiramate  (TOPAMAX) 100 MG tablet TAKE 3 TABLETS BY MOUTH TWICE DAILY 180 tablet 11    venlafaxine (EFFEXOR-XR) 75 MG 24 hr capsule TAKE 1 CAPSULE(75 MG) BY MOUTH EVERY EVENING 90 capsule 3     No current facility-administered medications for this visit.         Objective Findings:  Vital Signs:  LMP  (LMP Unknown)   There is no height or weight on file to calculate BMI.    Physical Exam:  General appearance: alert, cooperative, no distress  HENT: Normocephalic, atraumatic, neck symmetrical, no nasal discharge  Eyes: conjunctivae/corneas clear, PERRL, EOM's intact  Lungs: clear to auscultation bilaterally, no dullness to percussion bilaterally  Heart: regular rate and rhythm without rub; no displacement of the PMI  Abdomen: soft, non-tender; bowel sounds normoactive; no organomegaly  Extremities: extremities symmetric; no clubbing, cyanosis, or edema  Integument: Skin color, texture, turgor normal; no rashes; hair distrubution normal  Neurologic: Alert and oriented X 3, normal strength, normal coordination and gait  Psychiatric: no pressured speech; normal affect; no evidence of impaired cognition    Labs:  Lab Results   Component Value Date    WBC 4.61 05/17/2019    HGB 12.3 05/17/2019    HCT 40.2 05/17/2019    MCV 91 05/17/2019     05/17/2019     Lab Results   Component Value Date    FERRITIN 9 (L) 05/17/2019     Lab Results   Component Value Date     03/21/2019    K 4.2 03/21/2019     03/21/2019    CO2 22 (L) 03/21/2019     (H) 03/21/2019    BUN 10 03/21/2019    CREATININE 1.2 03/21/2019    CALCIUM 9.0 03/21/2019    PROT 7.1 03/21/2019    ALBUMIN 3.1 (L) 03/21/2019    BILITOT 0.2 03/21/2019    ALKPHOS 80 03/21/2019    AST 9 (L) 03/21/2019    ALT 13 03/21/2019     Lab Results   Component Value Date    TSH 1.229 11/05/2018     Lab Results   Component Value Date    SEDRATE 55 (H) 08/08/2018     Lab Results   Component Value Date    CRP 11.4 (H) 08/08/2018     Lab Results   Component Value Date     HGBA1C 6.5 (H) 03/20/2019           Assessment and Plan:  Yanni Kaiser is a 48 y.o. female with a PMH of anemia, arthritis, back pain, plantar fascitis, asthma,  anxiety, MDD, DM2 , HTN, migraines, seizures, SHARATH referred to motility clinic for second opinion regarding the following problems:    GERD. Refractory  Some improvement with prilosec OTC 40 mg once daily in the past.  Some improvement w pantoprazole 40 mg to BID.   -Add gaviscon w alginate   -EGD w BRAVO pff PPI and EoE bx EndoFlip    Chest pain. Reports bothersome chest pain. Occurring for a few weeks. Negative cardiac work up  Improvement with belching  -PPI BID  -EGD w BRAVO pff PPI   -Manometry     Dysphagia.  EGD negative.  -PPI BID  -EGD w EoE bx EndoFlip  -Manometry   -esophagram with 13 mm BT     Nausea.  Early satiety. No vomiting. DM Gastroparesis (smartPill in 2016 with dalayed  gastric emptying).  EGD with gastric fluid and pill in stomach.   on percocet weekly  Unable to tolerate reglan due to twitching.   Some improvement with phenergan IV  -zofran 8 mg TID prn   -FDgard prn   -Again discussed gastroparesis diet. Has seen GI dietitian        DM type 2. Diagnosed 10 year ago. BS running 90-120s. Last hba1c 6.5  On metformin 1000 mg BID.   Previously followed by endocrinologist, currently followed by PCP.     Abdominal pain.  EGD negative. TTG/IgA negative.   Recent CT with possible diverticulitis, txed w abx - LLQ pain resolved  Still w some epigastric pain   On seroquel, effexor  On percocet  On melaxoicam  -FDgard prn     Gas and bloating.   Avoids artificial sugars.  No improvement with lactose elimination  Stop probiotic   Recently treated with Augmentin tid x 10days to treat possible diverticulitis.    Constipation.  Normal colon transit.    No improvement with miralax once daily  No improvement with  Amitiza ?dose? BID  Insurance does not cover trulance.  -linzess 290 mcg daily  -miralax 4 times daily prn   -Will consider MRI  defecography  -Will consider ARM    Possible prolapse. Pt denies at this time   -Will consider MRI Defecogram at next visit     Weight fluctuates.     H. Pylori in 2016. Unsure if treated. EGD negative.  Recent stools + s/p doxy/flagyl/pepto/ppix 14 days   -Check stools for h pylori off PPI.     Anemia. Low hgb. Labs with persistent, mild KASH. EGD/colon negative  -labs     CT w possible diverticulitis vs epiploic appendagitis. Treated with Augmentin tid x 10days.  Colon negative.  LUQ pain resolved after treatment.     Fatty liver.     Family history of colon cancer. Mother dx in 50s, MGM ?dx ?. Colonoscopy with 3 mm cecal TA and hyperplastic polyps.   Repeat in 5 years (2024).    Anxiety. Depression.   Some improvement with seroquel 25 mg HS, also takes effexor 75 mg HS (for HAs).   Has seen psych in the past. Followed by PCP.    Insomnia. Not on meds. Diagnosed with SHARATH. Not on cpap/does not like it.   -Disucussed the importance of sleep in functional GI disorders.  Will consider seeing a sleep specialist and CBT for sleep  -Fu w sleep     Migraines. Seizures.   Some improvement with tompiramate 300 mg  BID, meloxicam 15 mg daily, imitrex 50 mg PRN, also taking effexor 75 mg HS, and aimovig once monthly Per neruology.    Plantar fascitis.   Stilltaking percocet 5-325 mg once weekly since 2/2018. Was on a different narcotic for one month prior to this.  -Again discussed the role of narcotic pain medication in motility and functional gastrointestinal disorders.  Will attempt to limit narcotic use.   -Followed by pain management and podiatry    Joint pain. Joint swelling. Knees. Hands. Back. ESR/CRP high  Seen by rheumatology w/ negative work up.    Follow up in about 2 months (around 7/17/2019) for CARLOS adan NP, Dr. Sweeney 5 months .    1. Gastroesophageal reflux disease, esophagitis presence not specified    2. Dysphagia, unspecified type    3. Iron deficiency anemia, unspecified iron deficiency anemia type    4.  H. pylori infection    5. Nausea and vomiting, intractability of vomiting not specified, unspecified vomiting type    6. Gastroesophageal reflux disease without esophagitis    7. Bloating    8. Abdominal pain, unspecified abdominal location    9. Constipation, unspecified constipation type          Order summary:  Orders Placed This Encounter    H. pylori antigen, stool    CBC auto differential    Ferritin    Iron and TIBC    Prealbumin    Case request GI: ESOPHAGOGASTRODUODENOSCOPY (EGD) with BRAVO    Case request GI: MANOMETRY-ESOPHAGEAL-WITH IMPEDANCE         Thank you so much for allowing me to participate in the care of Yanni Sweeney MD

## 2019-05-20 ENCOUNTER — TELEPHONE (OUTPATIENT)
Dept: GASTROENTEROLOGY | Facility: CLINIC | Age: 49
End: 2019-05-20

## 2019-05-20 NOTE — PROGRESS NOTES
Chief Complaint   Patient presents with    Other     Sinus /Allergy and above eyes pressure and facial swelling/ Neck Pain         48 y.o. female presents with chronic history of sinus pressure and pain. She reports a previous sinus surgery by Dr. Eaton about 10 years ago. She had some improvement after the surgery, but she is now having several sinus infections every year requiring multiple courses of antibiotics. No recent CT sinus. On Flonase, nasal saline, and Singulair.      Review of Systems     Constitutional: Negative for fatigue and unexpected weight change.   HENT: per HPI.  Eyes: Negative for visual disturbance.   Respiratory: Negative for shortness of breath, hemoptysis  Cardiovascular: Negative for chest pain and palpitations.   Genitourinary: Negative for dysuria and difficulty urinating.   Musculoskeletal: Negative for decreased ROM, back pain.   Skin: Negative for rash, sunburn, itching.   Neurological: Negative for dizziness and seizures.   Hematological: Negative for adenopathy. Does not bruise/bleed easily.   Psychiatric/Behavioral: Negative for agitation. The patient is not nervous/anxious.   Endocrine: Negative for rapid weight loss/weight gain, heat/cold intolerance.     Past Medical History:   Diagnosis Date    Allergy     Anemia     Asthma     Cigarette smoker     Depression     Diabetes with neurologic complications     GERD (gastroesophageal reflux disease)     High cholesterol     Hyperprolactinemia     Hypertension     Obese body habitus     Pseudotumor cerebri     Seizures     Simple endometrial hyperplasia 2018    Sleep apnea     Smoker        Past Surgical History:   Procedure Laterality Date    BREAST CYST ASPIRATION       SECTION      X 1    CHOLECYSTECTOMY      COLONOSCOPY      COLONOSCOPY N/A 2019    Performed by Jagruti Sweeney MD at [facility] ENDO (2ND FLR)    EGD (ESOPHAGOGASTRODUODENOSCOPY) N/A 2019    Performed by Jagruti Sweeney MD at [facility]  ENDO (2ND FLR)    HYSTEROSCOPY, WITH DILATION AND CURETTAGE OF UTERUS N/A 10/16/2018    Performed by Glenn Black Jr[facility] at [facility] OR    RETINAL LASER PROCEDURE Left     SINUS SURGERY      UPPER GASTROINTESTINAL ENDOSCOPY         family history includes Breast cancer in her maternal grandmother; COPD in her father; Cancer in her maternal grandmother; Colon cancer in her maternal grandmother; Colon cancer (age of onset: 50) in her mother; Colon polyps in her maternal grandmother and mother; Diabetes in her mother; Heart attack in her father; Heart disease in her father; Hypertension in her father and mother; No Known Problems in her brother, maternal aunt, maternal grandfather, maternal uncle, paternal aunt, paternal grandfather, paternal grandmother, paternal uncle, and sister; Ulcers in her father.    Pt  reports that she has been smoking cigarettes.  She has a 15.00 pack-year smoking history. She has never used smokeless tobacco. She reports that she does not drink alcohol or use drugs.    Review of patient's allergies indicates:  No Known Allergies     Physical Exam    Vitals:    05/10/19 1511   BP: 122/74     Body mass index is 63.06 kg/m².    Physical Exam   Constitutional: She is oriented to person, place, and time. She appears well-developed and well-nourished. No distress.   HENT:   Head: Normocephalic and atraumatic.   Right Ear: Hearing, tympanic membrane, external ear and ear canal normal. Tympanic membrane mobility is normal. No middle ear effusion. No decreased hearing is noted.   Left Ear: Hearing, tympanic membrane, external ear and ear canal normal. Tympanic membrane mobility is normal.  No middle ear effusion. No decreased hearing is noted.   Nose: Mucosal edema present.   Mouth/Throat: Oropharynx is clear and moist.   Bilateral nasal cavities inspected, no polyps or purulent fluid seen.   Eyes: Pupils are equal, round, and reactive to light. Conjunctivae, EOM and lids are normal. Right eye  exhibits no discharge. Left eye exhibits no discharge.   Neck: Trachea normal, normal range of motion and phonation normal. Neck supple. No tracheal tenderness present. No tracheal deviation, no edema and no erythema present. No thyroid mass and no thyromegaly present.   Cardiovascular: Normal heart sounds.   Pulmonary/Chest: Breath sounds normal. No stridor.   Abdominal: Soft.   Lymphadenopathy:     She has no cervical adenopathy.   Neurological: She is alert and oriented to person, place, and time.   Skin: Skin is warm and dry. No rash noted. She is not diaphoretic. No erythema. No pallor.   Psychiatric: She has a normal mood and affect.   Nursing note and vitals reviewed.        Assessment     1. Chronic sinusitis, unspecified location          Plan  In summary, Ms. Kaiser is a 48 year old female with chronic sinusitis complaints and s/p ESS 10 years ago. Will obtain new CT sinus and she will follow up with either Dr. Stuart or Dr. Prado once the study is complete. All questions were answered and she is in agreement with the plan.

## 2019-05-20 NOTE — TELEPHONE ENCOUNTER
Lab results along w recommendations given to pt. Pt verbalized understanding. Dietician appt arranged    Pt would like for endoscopy schedulers to give her a call back.

## 2019-06-03 DIAGNOSIS — F33.3 SEVERE EPISODE OF RECURRENT MAJOR DEPRESSIVE DISORDER, WITH PSYCHOTIC FEATURES: ICD-10-CM

## 2019-06-03 RX ORDER — QUETIAPINE FUMARATE 25 MG/1
TABLET, FILM COATED ORAL
Qty: 90 TABLET | Refills: 0 | Status: SHIPPED | OUTPATIENT
Start: 2019-06-03 | End: 2019-09-03 | Stop reason: SDUPTHER

## 2019-06-03 RX ORDER — HYDROXYZINE HYDROCHLORIDE 50 MG/1
TABLET, FILM COATED ORAL
Qty: 120 TABLET | Refills: 3 | Status: SHIPPED | OUTPATIENT
Start: 2019-06-03 | End: 2019-11-29 | Stop reason: CLARIF

## 2019-06-11 ENCOUNTER — TELEPHONE (OUTPATIENT)
Dept: RADIOLOGY | Facility: HOSPITAL | Age: 49
End: 2019-06-11

## 2019-06-12 ENCOUNTER — HOSPITAL ENCOUNTER (OUTPATIENT)
Dept: RADIOLOGY | Facility: HOSPITAL | Age: 49
Discharge: HOME | End: 2019-06-12
Attending: PODIATRIST | Admitting: INTERNAL MEDICINE
Payer: MEDICARE

## 2019-06-12 ENCOUNTER — HOSPITAL ENCOUNTER (OUTPATIENT)
Dept: RADIOLOGY | Facility: HOSPITAL | Age: 49
Discharge: HOME | End: 2019-06-12
Attending: INTERNAL MEDICINE | Admitting: INTERNAL MEDICINE
Payer: MEDICARE

## 2019-06-12 DIAGNOSIS — E11.49 TYPE II DIABETES MELLITUS WITH NEUROLOGICAL MANIFESTATIONS: ICD-10-CM

## 2019-06-12 DIAGNOSIS — R13.10 DYSPHAGIA, UNSPECIFIED TYPE: ICD-10-CM

## 2019-06-12 DIAGNOSIS — M79.672 CHRONIC HEEL PAIN, LEFT: ICD-10-CM

## 2019-06-12 DIAGNOSIS — G89.29 CHRONIC HEEL PAIN, LEFT: ICD-10-CM

## 2019-06-12 PROCEDURE — 73718 MRI LOWER EXTREMITY W/O DYE: CPT | Mod: TC,HCNC,LT

## 2019-06-12 PROCEDURE — 73718 MRI LOWER EXTREMITY W/O DYE: CPT | Mod: 26,HCNC,LT, | Performed by: RADIOLOGY

## 2019-06-12 PROCEDURE — 74220 FL ESOPHAGRAM COMPLETE: ICD-10-PCS | Mod: 26,HCNC,, | Performed by: RADIOLOGY

## 2019-06-12 PROCEDURE — 74220 X-RAY XM ESOPHAGUS 1CNTRST: CPT | Mod: 26,HCNC,, | Performed by: RADIOLOGY

## 2019-06-12 PROCEDURE — 74220 X-RAY XM ESOPHAGUS 1CNTRST: CPT | Mod: TC,HCNC

## 2019-06-12 PROCEDURE — 73718 MRI FOOT (HINDFOOT) LEFT WITHOUT CONTRAST: ICD-10-PCS | Mod: 26,HCNC,LT, | Performed by: RADIOLOGY

## 2019-06-14 ENCOUNTER — TELEPHONE (OUTPATIENT)
Dept: BARIATRICS | Facility: CLINIC | Age: 49
End: 2019-06-14

## 2019-06-14 NOTE — TELEPHONE ENCOUNTER
Spoke with patient esther tapia to schedule her for a consult appointment with Dr. Salcedo for thursdays 04/11/2019 11:30am

## 2019-06-18 DIAGNOSIS — Z91.09 ENVIRONMENTAL ALLERGIES: ICD-10-CM

## 2019-06-18 DIAGNOSIS — J31.0 CHRONIC RHINITIS: ICD-10-CM

## 2019-06-19 RX ORDER — FLUTICASONE PROPIONATE 50 MCG
1 SPRAY, SUSPENSION (ML) NASAL DAILY
Qty: 15.8 ML | Refills: 2 | Status: SHIPPED | OUTPATIENT
Start: 2019-06-19 | End: 2020-08-19

## 2019-06-27 ENCOUNTER — OFFICE VISIT (OUTPATIENT)
Dept: FAMILY MEDICINE | Facility: CLINIC | Age: 49
End: 2019-06-27
Payer: MEDICARE

## 2019-06-27 VITALS
TEMPERATURE: 99 F | BODY MASS INDEX: 50.02 KG/M2 | HEART RATE: 72 BPM | RESPIRATION RATE: 17 BRPM | DIASTOLIC BLOOD PRESSURE: 76 MMHG | SYSTOLIC BLOOD PRESSURE: 132 MMHG | OXYGEN SATURATION: 95 % | HEIGHT: 64 IN | WEIGHT: 293 LBS

## 2019-06-27 DIAGNOSIS — R51.9 CHRONIC NONINTRACTABLE HEADACHE, UNSPECIFIED HEADACHE TYPE: Primary | ICD-10-CM

## 2019-06-27 DIAGNOSIS — G89.29 CHRONIC NONINTRACTABLE HEADACHE, UNSPECIFIED HEADACHE TYPE: Primary | ICD-10-CM

## 2019-06-27 DIAGNOSIS — R07.9 INTERMITTENT CHEST PAIN: ICD-10-CM

## 2019-06-27 PROCEDURE — 99406 BEHAV CHNG SMOKING 3-10 MIN: CPT | Mod: HCNC,S$GLB,, | Performed by: FAMILY MEDICINE

## 2019-06-27 PROCEDURE — 99999 PR PBB SHADOW E&M-EST. PATIENT-LVL IV: CPT | Mod: PBBFAC,HCNC,, | Performed by: FAMILY MEDICINE

## 2019-06-27 PROCEDURE — 3075F PR MOST RECENT SYSTOLIC BLOOD PRESS GE 130-139MM HG: ICD-10-PCS | Mod: HCNC,CPTII,S$GLB, | Performed by: FAMILY MEDICINE

## 2019-06-27 PROCEDURE — 3008F PR BODY MASS INDEX (BMI) DOCUMENTED: ICD-10-PCS | Mod: HCNC,CPTII,S$GLB, | Performed by: FAMILY MEDICINE

## 2019-06-27 PROCEDURE — 99999 PR PBB SHADOW E&M-EST. PATIENT-LVL IV: ICD-10-PCS | Mod: PBBFAC,HCNC,, | Performed by: FAMILY MEDICINE

## 2019-06-27 PROCEDURE — 3075F SYST BP GE 130 - 139MM HG: CPT | Mod: HCNC,CPTII,S$GLB, | Performed by: FAMILY MEDICINE

## 2019-06-27 PROCEDURE — 99214 OFFICE O/P EST MOD 30 MIN: CPT | Mod: HCNC,25,S$GLB, | Performed by: FAMILY MEDICINE

## 2019-06-27 PROCEDURE — 3078F PR MOST RECENT DIASTOLIC BLOOD PRESSURE < 80 MM HG: ICD-10-PCS | Mod: HCNC,CPTII,S$GLB, | Performed by: FAMILY MEDICINE

## 2019-06-27 PROCEDURE — 99406 PR TOBACCO USE CESSATION INTERMEDIATE 3-10 MINUTES: ICD-10-PCS | Mod: HCNC,S$GLB,, | Performed by: FAMILY MEDICINE

## 2019-06-27 PROCEDURE — 3008F BODY MASS INDEX DOCD: CPT | Mod: HCNC,CPTII,S$GLB, | Performed by: FAMILY MEDICINE

## 2019-06-27 PROCEDURE — 3078F DIAST BP <80 MM HG: CPT | Mod: HCNC,CPTII,S$GLB, | Performed by: FAMILY MEDICINE

## 2019-06-27 PROCEDURE — 99214 PR OFFICE/OUTPT VISIT, EST, LEVL IV, 30-39 MIN: ICD-10-PCS | Mod: HCNC,25,S$GLB, | Performed by: FAMILY MEDICINE

## 2019-06-30 NOTE — PROGRESS NOTES
"Routine Office Visit    Patient Name: Yanni Kaiser    : 1970  MRN: [id number]    Subjective:  Yanni is a 48 y.o. female who presents today for:    1. Headaches and chest pain  Patient presenting today with complaints of recurring headaches and left sided chest pain.  She states that the chest pain is recurring and happens at random.  She states it feels like a stabbing pain and she complains of increased swelling "throughout my body".  Patient is morbidly obese with history of asthma (she does continue to smoke 0.5-1ppd), type 2 DM that is controlled, pseudotumor cerebri (that has been followed by Dr. Hays), and HTN.  She states that the headaches happen several times a week and are severe.  They are mainly in the front of the head and cause photophobia and phonophobia.  There has been no change in headaches despite history of spinal taps and medications prescribed by neurology.  Patient does have a history of seizure disorder, but per neurology note no EEG has been positive for seizure activity.  Patient would like to get a second opinion about her headaches and would like to see cardiology about her chest pain.  When she does have either a headache or chest pain there is no associated numbness, tingling, weakness, slurred speech, or changes in vision.      Past Medical History  Past Medical History:   Diagnosis Date    Allergy     Anemia     Asthma     Cigarette smoker     Depression     Diabetes with neurologic complications     GERD (gastroesophageal reflux disease)     High cholesterol     Hyperprolactinemia     Hypertension     Obese body habitus     Pseudotumor cerebri     Seizures     Simple endometrial hyperplasia 2018    Sleep apnea     Smoker        Past Surgical History  Past Surgical History:   Procedure Laterality Date    BREAST CYST ASPIRATION       SECTION      X 1    CHOLECYSTECTOMY      COLONOSCOPY      COLONOSCOPY N/A 2019    Performed by Jagruti" FREYA Sweeney MD at [facility] ENDO (2ND FLR)    EGD (ESOPHAGOGASTRODUODENOSCOPY) N/A 1/14/2019    Performed by Jagruti Sweeney MD at [facility] ENDO (2ND FLR)    HYSTEROSCOPY, WITH DILATION AND CURETTAGE OF UTERUS N/A 10/16/2018    Performed by Glenn [facility] at [facility] OR    RETINAL LASER PROCEDURE Left     SINUS SURGERY      UPPER GASTROINTESTINAL ENDOSCOPY         Family History  Family History   Problem Relation Age of Onset    Hypertension Mother     Diabetes Mother     Colon cancer Mother 50    Colon polyps Mother     Heart disease Father     COPD Father     Heart attack Father     Hypertension Father     Ulcers Father     Cancer Maternal Grandmother     Breast cancer Maternal Grandmother     Colon cancer Maternal Grandmother     Colon polyps Maternal Grandmother     No Known Problems Paternal Grandmother     No Known Problems Sister     No Known Problems Brother     No Known Problems Maternal Aunt     No Known Problems Maternal Uncle     No Known Problems Paternal Aunt     No Known Problems Paternal Uncle     No Known Problems Maternal Grandfather     No Known Problems Paternal Grandfather     Celiac disease Neg Hx     Cirrhosis Neg Hx     Crohn's disease Neg Hx     Cystic fibrosis Neg Hx     Esophageal cancer Neg Hx     Hemochromatosis Neg Hx     Inflammatory bowel disease Neg Hx     Irritable bowel syndrome Neg Hx     Liver cancer Neg Hx     Liver disease Neg Hx     Rectal cancer Neg Hx     Stomach cancer Neg Hx     Ulcerative colitis Neg Hx     Jonnie's disease Neg Hx     Tuberculosis Neg Hx     Lymphoma Neg Hx     Scleroderma Neg Hx     Rheum arthritis Neg Hx     Melanoma Neg Hx     Multiple sclerosis Neg Hx     Psoriasis Neg Hx     Lupus Neg Hx     Skin cancer Neg Hx     Amblyopia Neg Hx     Blindness Neg Hx     Cataracts Neg Hx     Glaucoma Neg Hx     Macular degeneration Neg Hx     Retinal detachment Neg Hx     Strabismus Neg Hx     Stroke Neg Hx      Thyroid disease Neg Hx        Social History  Social History     Socioeconomic History    Marital status:      Spouse name: Not on file    Number of children: Not on file    Years of education: Not on file    Highest education level: Not on file   Occupational History    Not on file   Social Needs    Financial resource strain: Not on file    Food insecurity:     Worry: Not on file     Inability: Not on file    Transportation needs:     Medical: Not on file     Non-medical: Not on file   Tobacco Use    Smoking status: Current Every Day Smoker     Packs/day: 1.00     Years: 15.00     Pack years: 15.00     Types: Cigarettes    Smokeless tobacco: Never Used    Tobacco comment: 1 pack every other day   Substance and Sexual Activity    Alcohol use: No     Alcohol/week: 0.0 oz    Drug use: No    Sexual activity: Yes     Partners: Male     Birth control/protection: None   Lifestyle    Physical activity:     Days per week: Not on file     Minutes per session: Not on file    Stress: Not on file   Relationships    Social connections:     Talks on phone: Not on file     Gets together: Not on file     Attends [employer] service: Not on file     Active member of club or organization: Not on file     Attends meetings of clubs or organizations: Not on file     Relationship status: Not on file   Other Topics Concern    Not on file   Social History Narrative    Not on file       Current Medications  Current Outpatient Medications on File Prior to Visit   Medication Sig Dispense Refill    acetaZOLAMIDE (DIAMOX) 500 mg CpSR Take 1 capsule (500 mg total) by mouth 2 (two) times daily. 360 capsule 1    albuterol (PROVENTIL/VENTOLIN HFA) 90 mcg/actuation inhaler INHALE 2 PUFFS BY MOUTH EVERY 6 HOURS AS NEEDED FOR WHEEZING 54 g 0    albuterol-ipratropium (DUO-NEB) 2.5 mg-0.5 mg/3 mL nebulizer solution Take 3 mLs by nebulization every 6 (six) hours as needed for Wheezing or Shortness of Breath. Rescue 100 mL 3     blood sugar diagnostic Strp 1 each by Misc.(Non-Drug; Combo Route) route 4 (four) times daily before meals and nightly. ACCU CHEK ELVIA PLUS METER 200 each 3    blood-glucose meter (ACCU-CHEK ELVIA PLUS METER) [facility] TEST FOUR TIMES DAILY BEFORE MEALS AND EVERY NIGHT 90 each 1    budesonide-formoterol 160-4.5 mcg (SYMBICORT) 160-4.5 mcg/actuation HFAA Inhale 2 puffs into the lungs every 12 (twelve) hours. Controller 1 Inhaler 5    capsaicin 0.1 % Crea Apply 1 application topically 2 (two) times daily. 56.6 g 1    diclofenac sodium (VOLTAREN) 1 % Gel Apply 2 g topically once daily. 100 g 3    fluticasone propionate (FLONASE) 50 mcg/actuation nasal spray 1 spray (50 mcg total) by Each Nare route once daily. 15.8 mL 2    hydroCHLOROthiazide (HYDRODIURIL) 25 MG tablet Take 1 tablet (25 mg total) by mouth once daily. 30 tablet 11    hydrOXYzine (ATARAX) 50 MG tablet Take 1-4 at night for itching. 120 tablet 3    lancets (ACCU-CHEK SOFTCLIX LANCETS) Misc 1 Device by Misc.(Non-Drug; Combo Route) route 4 (four) times daily. 200 each 3    linaclotide (LINZESS) 290 mcg Cap Take 1 capsule (290 mcg total) by mouth once daily. 90 capsule 3    losartan (COZAAR) 100 MG tablet Take 1 tablet (100 mg total) by mouth once daily. 90 tablet 0    meloxicam (MOBIC) 15 MG tablet TAKE 1 TABLET(15 MG) BY MOUTH DAILY AS NEEDED FOR HEADACHE 90 tablet 1    metFORMIN (GLUCOPHAGE-XR) 500 MG 24 hr tablet Take 2 tablets (1,000 mg total) by mouth 2 (two) times daily with meals. 360 tablet 0    mometasone 0.1% (ELOCON) 0.1 % cream       ondansetron (ZOFRAN) 8 MG tablet Take 1 tablet (8 mg total) by mouth every 8 (eight) hours as needed for Nausea. 90 tablet 5    oxyCODONE-acetaminophen (PERCOCET)  mg per tablet TAKE 1 TABLET BY MOUTH EVERY 8 HOURS AS NEEDED. MAY CAUSE DROWSINESS  0    QUEtiapine (SEROQUEL) 25 MG Tab TAKE 1 TABLET(25 MG) BY MOUTH EVERY EVENING 90 tablet 0    sumatriptan (IMITREX) 50 MG tablet Take 1 tab at onset  of headaches.  If no improvement in 2 hours, take another.  Do not take more than 2 tabs in 24 hours. 9 tablet 5    tiotropium (SPIRIVA) 18 mcg inhalation capsule Inhale 1 capsule (18 mcg total) into the lungs once daily. Controller 30 capsule 11    tiZANidine (ZANAFLEX) 4 MG tablet Take 4 mg by mouth every 8 (eight) hours.       topiramate (TOPAMAX) 100 MG tablet TAKE 3 TABLETS BY MOUTH TWICE DAILY 180 tablet 11    venlafaxine (EFFEXOR-XR) 75 MG 24 hr capsule TAKE 1 CAPSULE(75 MG) BY MOUTH EVERY EVENING 90 capsule 3    GAVILYTE-G 236-22.74-6.74 -5.86 gram suspension   0    levocetirizine (XYZAL) 5 MG tablet Take 1 tablet (5 mg total) by mouth every evening. 30 tablet 11    medroxyPROGESTERone (PROVERA) 10 MG tablet Take 1 tablet (10 mg total) by mouth once daily. TAKE DAY 1-12 Q MONTH 12 tablet 12    metroNIDAZOLE (FLAGYL) 500 MG tablet Take 1 tablet (500 mg total) by mouth 4 (four) times daily. Home medication      montelukast (SINGULAIR) 10 mg tablet TAKE 1 TABLET(10 MG) BY MOUTH EVERY EVENING 30 tablet 0    nicotine polacrilex 2 MG Lozg Take 1 lozenge (2 mg total) by mouth as needed. 168 lozenge 0    pantoprazole (PROTONIX) 40 MG tablet Take 1 tablet (40 mg total) by mouth 2 (two) times daily. 180 tablet 3     No current facility-administered medications on file prior to visit.        Allergies   Review of patient's allergies indicates:  No Known Allergies    Review of Systems (Pertinent positives)  Review of Systems   Constitutional: Negative.    HENT: Negative.    Eyes: Positive for photophobia.   Respiratory: Negative.    Cardiovascular: Positive for chest pain and leg swelling.   Gastrointestinal: Negative.    Musculoskeletal: Positive for back pain and myalgias.   Skin: Negative.    Neurological: Positive for headaches. Negative for dizziness, tingling, tremors, sensory change and speech change.         /76 (BP Location: Left arm, Patient Position: Sitting, BP Method: Large (Automatic))    "Pulse 72   Temp 98.7 °F (37.1 °C) (Oral)   Resp 17   Ht 5' 4.02" (1.626 m)   Wt (!) 167.9 kg (370 lb 2.4 oz)   LMP  (LMP Unknown)   SpO2 95%   BMI 63.50 kg/m²     GENERAL APPEARANCE: in no apparent distress and well developed and well nourished  HEENT: PERRL, EOMI, Sclera clear, anicteric, Oropharynx clear, no lesions, Neck supple with midline trachea  NECK: normal, supple, no adenopathy, thyroid normal in size  RESPIRATORY: appears well, vitals normal, no respiratory distress, acyanotic, normal RR, chest clear, no wheezing, crepitations, rhonchi, normal symmetric air entry  HEART: regular rate and rhythm, S1, S2 normal, no murmur, click, rub or gallop.    ABDOMEN: abdomen is soft without tenderness, no masses, no hernias, no organomegaly, no rebound, no guarding. Suprapubic tenderness absent. No CVA tenderness.  NEUROLOGIC: normal without focal findings, CN II-XII are intact.    Extremities: warm/well perfused.  No abnormal hair patterns.  No clubbing, cyanosis; +1 edema in bilateral lower extremities  SKIN: no rashes, no wounds, no other lesions  PSYCH: Alert, oriented x 3, thought content appropriate, speech normal, pleasant and cooperative, good eye contact, well groomed, recall good, concentration/judgement good and apparently average intelligence.    Assessment/Plan:  Yanni Kaiser is a 48 y.o. female who presents today for :    Yanni was seen today for fatigue, migraine and chest pain.    Diagnoses and all orders for this visit:    Chronic nonintractable headache, unspecified headache type  -     Ambulatory referral to Neurology  -     Comprehensive metabolic panel; Future    Intermittent chest pain  -     Ambulatory referral to Cardiology  -     Troponin I; Future      1.  Blood pressure stable today   2.  Patient informed that her morbid obesity, smoking, and diabetes all put her at increased risk of cardiovascular disease and stroke.   -discusses going back to smoking cessation as well as " other alternatives such as hypnosis to help quit smoking   - patient will try to cut back on number of cigarettes she smokes per day as she is aware per past admission that her smoking flares her asthma and can be life threatening   - 5 minutes spent discussing smoking cessation  3.  She requests second opinion about headaches, so referral to Dr. Amador placed  4.  Cardiology referral placed  5.  She is to go to the ED for any future chest pain when it is occurring as nothing is present today  7.  She was instructed to quit smoking as she is an asthmatic as well  8.  Patient is high risk for life threatening events given her numerous medical conditions      Carlyle Gordillo MD

## 2019-07-01 ENCOUNTER — CLINICAL SUPPORT (OUTPATIENT)
Dept: SMOKING CESSATION | Facility: CLINIC | Age: 49
End: 2019-07-01
Payer: COMMERCIAL

## 2019-07-01 ENCOUNTER — PATIENT OUTREACH (OUTPATIENT)
Dept: ADMINISTRATIVE | Facility: OTHER | Age: 49
End: 2019-07-01

## 2019-07-01 DIAGNOSIS — F17.200 NICOTINE DEPENDENCE: Primary | ICD-10-CM

## 2019-07-01 DIAGNOSIS — Z12.31 ENCOUNTER FOR SCREENING MAMMOGRAM FOR MALIGNANT NEOPLASM OF BREAST: Primary | ICD-10-CM

## 2019-07-01 PROCEDURE — 99407 PR TOBACCO USE CESSATION INTENSIVE >10 MINUTES: ICD-10-PCS | Mod: S$GLB,,,

## 2019-07-01 PROCEDURE — 99407 BEHAV CHNG SMOKING > 10 MIN: CPT | Mod: S$GLB,,,

## 2019-07-01 RX ORDER — METFORMIN HYDROCHLORIDE 500 MG/1
1000 TABLET, EXTENDED RELEASE ORAL 2 TIMES DAILY WITH MEALS
Qty: 360 TABLET | Refills: 0 | Status: SHIPPED | OUTPATIENT
Start: 2019-07-01 | End: 2019-09-27 | Stop reason: SDUPTHER

## 2019-07-01 RX ORDER — LOSARTAN POTASSIUM 100 MG/1
100 TABLET ORAL DAILY
Qty: 90 TABLET | Refills: 0 | Status: SHIPPED | OUTPATIENT
Start: 2019-07-01 | End: 2019-09-27 | Stop reason: SDUPTHER

## 2019-07-01 NOTE — PROGRESS NOTES
Spoke with patient today in regard to smoking cessation progress 12 month phone follow up, states not tobacco free. Patient is not interested in returning to the smoking cessation program at this time. Will call when ready to schedule. Informed patient of benefit period, and contact information. Will complete smart form for 12 month phone follow up on Quit attempt #1.

## 2019-07-03 ENCOUNTER — OFFICE VISIT (OUTPATIENT)
Dept: PODIATRY | Facility: CLINIC | Age: 49
End: 2019-07-03
Payer: MEDICARE

## 2019-07-03 VITALS — BODY MASS INDEX: 50.02 KG/M2 | WEIGHT: 293 LBS | HEIGHT: 64 IN

## 2019-07-03 DIAGNOSIS — E11.51 DIABETES MELLITUS WITH PERIPHERAL CIRCULATORY DISORDER: ICD-10-CM

## 2019-07-03 DIAGNOSIS — E11.49 TYPE II DIABETES MELLITUS WITH NEUROLOGICAL MANIFESTATIONS: Primary | ICD-10-CM

## 2019-07-03 DIAGNOSIS — M21.619 BUNION: ICD-10-CM

## 2019-07-03 DIAGNOSIS — M20.40 HAMMER TOE, UNSPECIFIED LATERALITY: ICD-10-CM

## 2019-07-03 DIAGNOSIS — Q66.6 PES VALGUS: ICD-10-CM

## 2019-07-03 DIAGNOSIS — M72.2 PLANTAR FASCIITIS: ICD-10-CM

## 2019-07-03 PROCEDURE — 20550 NJX 1 TENDON SHEATH/LIGAMENT: CPT | Mod: HCNC,LT,S$GLB,

## 2019-07-03 PROCEDURE — 3008F PR BODY MASS INDEX (BMI) DOCUMENTED: ICD-10-PCS | Mod: HCNC,CPTII,S$GLB,

## 2019-07-03 PROCEDURE — 99214 PR OFFICE/OUTPT VISIT, EST, LEVL IV, 30-39 MIN: ICD-10-PCS | Mod: 25,HCNC,S$GLB,

## 2019-07-03 PROCEDURE — 99214 OFFICE O/P EST MOD 30 MIN: CPT | Mod: 25,HCNC,S$GLB,

## 2019-07-03 PROCEDURE — 3044F HG A1C LEVEL LT 7.0%: CPT | Mod: HCNC,CPTII,S$GLB,

## 2019-07-03 PROCEDURE — 3008F BODY MASS INDEX DOCD: CPT | Mod: HCNC,CPTII,S$GLB,

## 2019-07-03 PROCEDURE — 99999 PR PBB SHADOW E&M-EST. PATIENT-LVL III: CPT | Mod: PBBFAC,HCNC,,

## 2019-07-03 PROCEDURE — 99999 PR PBB SHADOW E&M-EST. PATIENT-LVL III: ICD-10-PCS | Mod: PBBFAC,HCNC,,

## 2019-07-03 PROCEDURE — 99499 RISK ADDL DX/OHS AUDIT: ICD-10-PCS | Mod: HCNC,S$GLB,,

## 2019-07-03 PROCEDURE — 3044F PR MOST RECENT HEMOGLOBIN A1C LEVEL <7.0%: ICD-10-PCS | Mod: HCNC,CPTII,S$GLB,

## 2019-07-03 PROCEDURE — 99499 UNLISTED E&M SERVICE: CPT | Mod: HCNC,S$GLB,,

## 2019-07-03 PROCEDURE — 20550 PR INJECT TENDON SHEATH/LIGAMENT: ICD-10-PCS | Mod: HCNC,LT,S$GLB,

## 2019-07-03 NOTE — LETTER
July 9, 2019      No Recipients           [facility]  [address] [address]  Phone: [phone]  Fax: [phone]          Patient: Yanni Kaiser   MR Number: [id number]   YOB: 1970   Date of Visit: 7/3/2019       Dear :    Thank you for referring Yanni Kaiser to me for evaluation. Attached you will find relevant portions of my assessment and plan of care.    If you have questions, please do not hesitate to call me. I look forward to following Yanni Kaiser along with you.    Sincerely,    Will Encarnacion, SANCHEZ    Enclosure  CC:  No Recipients    If you would like to receive this communication electronically, please contact [email or web address] or [phone] to request more information on [employer] access.    For providers and/or their staff who would like to refer a patient to [facility], please contact us through our one-stop-shop provider referral line, Yovani Oneal, at [phone].    If you feel you have received this communication in error or would no longer like to receive these types of communications, please e-mail [email or web address]

## 2019-07-03 NOTE — PROGRESS NOTES
Subjective:      Patient ID: Yanni Kaiser is a 48 y.o. female.    Chief Complaint: Foot Pain (Bilateral); Plantar Fasciitis; and Diabetes Mellitus (PCP: Carlyle Gordillo 06/27/2019  A1C: 03/20/2019 / 6.5)    Yanni is a 48 y.o. female who presents to the clinic for second opinion regarding heel pain right foot, being treated by Dr. Barnes for plantar fasciitis, tried orthotics, topical and oral pain meds, night splint, stretching.  MRI did show plantar fasciitis.  She is also diabetic with neuropathy.  . Yanni has a past medical history of Allergy, Anemia, Asthma, Cigarette smoker, Depression, Diabetes with neurologic complications, GERD (gastroesophageal reflux disease), High cholesterol, Hyperprolactinemia, Hypertension, Obese body habitus, Pseudotumor cerebri, Seizures, Simple endometrial hyperplasia (2018), Sleep apnea, and Smoker. This patient has documented high risk feet requiring routine maintenance secondary to peripheral neuropathy.    PCP: Carlyle Gordillo MD        Current shoe gear:  Affected Foot: Tennis shoes     Unaffected Foot: Tennis shoes    Hemoglobin A1C   Date Value Ref Range Status   03/20/2019 6.5 (H) 4.0 - 5.6 % Final     Comment:     ADA Screening Guidelines:  5.7-6.4%  Consistent with prediabetes  >or=6.5%  Consistent with diabetes  High levels of fetal hemoglobin interfere with the HbA1C  assay. Heterozygous hemoglobin variants (HbS, HgC, etc)do  not significantly interfere with this assay.   However, presence of multiple variants may affect accuracy.     07/30/2018 5.8 (H) 4.0 - 5.6 % Final     Comment:     ADA Screening Guidelines:  5.7-6.4%  Consistent with prediabetes  >or=6.5%  Consistent with diabetes  High levels of fetal hemoglobin interfere with the HbA1C  assay. Heterozygous hemoglobin variants (HbS, HgC, etc)do  not significantly interfere with this assay.   However, presence of multiple variants may affect accuracy.     01/22/2018 5.8 (H) 4.0 - 5.6 % Final      Comment:     According to ADA guidelines, hemoglobin A1c <7.0% represents  optimal control in non-pregnant diabetic patients. Different  metrics may apply to specific patient populations.   Standards of Medical Care in Diabetes-2016.  For the purpose of screening for the presence of diabetes:  <5.7%     Consistent with the absence of diabetes  5.7-6.4%  Consistent with increasing risk for diabetes   (prediabetes)  >or=6.5%  Consistent with diabetes  Currently, no consensus exists for use of hemoglobin A1c  for diagnosis of diabetes for children.  This Hemoglobin A1c assay has significant interference with fetal   hemoglobin   (HbF). The results are invalid for patients with abnormal amounts of   HbF,   including those with known Hereditary Persistence   of Fetal Hemoglobin. Heterozygous hemoglobin variants (HbAS, HbAC,   HbAD, HbAE, HbA2) do not significantly interfere with this assay;   however, presence of multiple variants in a sample may impact the %   interference.         Past Medical History:   Diagnosis Date    Allergy     Anemia     Asthma     Cigarette smoker     Depression     Diabetes with neurologic complications     GERD (gastroesophageal reflux disease)     High cholesterol     Hyperprolactinemia     Hypertension     Obese body habitus     Pseudotumor cerebri     Seizures     Simple endometrial hyperplasia 2018    Sleep apnea     Smoker        Past Surgical History:   Procedure Laterality Date    BREAST CYST ASPIRATION       SECTION      X 1    CHOLECYSTECTOMY      COLONOSCOPY      COLONOSCOPY N/A 2019    Performed by [facility] at [facility] (2ND FLR)    EGD (ESOPHAGOGASTRODUODENOSCOPY) N/A 2019    Performed by [facility] at [facility] ENDO (2ND FLR)    HYSTEROSCOPY, WITH DILATION AND CURETTAGE OF UTERUS N/A 10/16/2018    [facility] Glenn [facility] Jr[facility] at [facility] OR    RETINAL LASER PROCEDURE Left     SINUS SURGERY      UPPER GASTROINTESTINAL  ENDOSCOPY         Family History   Problem Relation Age of Onset    Hypertension Mother     Diabetes Mother     Colon cancer Mother 50    Colon polyps Mother     Heart disease Father     COPD Father     Heart attack Father     Hypertension Father     Ulcers Father     Cancer Maternal Grandmother     Breast cancer Maternal Grandmother     Colon cancer Maternal Grandmother     Colon polyps Maternal Grandmother     No Known Problems Paternal Grandmother     No Known Problems Sister     No Known Problems Brother     No Known Problems Maternal Aunt     No Known Problems Maternal Uncle     No Known Problems Paternal Aunt     No Known Problems Paternal Uncle     No Known Problems Maternal Grandfather     No Known Problems Paternal Grandfather     Celiac disease Neg Hx     Cirrhosis Neg Hx     Crohn's disease Neg Hx     Cystic fibrosis Neg Hx     Esophageal cancer Neg Hx     Hemochromatosis Neg Hx     Inflammatory bowel disease Neg Hx     Irritable bowel syndrome Neg Hx     Liver cancer Neg Hx     Liver disease Neg Hx     Rectal cancer Neg Hx     Stomach cancer Neg Hx     Ulcerative colitis Neg Hx     Jonnie's disease Neg Hx     Tuberculosis Neg Hx     Lymphoma Neg Hx     Scleroderma Neg Hx     Rheum arthritis Neg Hx     Melanoma Neg Hx     Multiple sclerosis Neg Hx     Psoriasis Neg Hx     Lupus Neg Hx     Skin cancer Neg Hx     Amblyopia Neg Hx     Blindness Neg Hx     Cataracts Neg Hx     Glaucoma Neg Hx     Macular degeneration Neg Hx     Retinal detachment Neg Hx     Strabismus Neg Hx     Stroke Neg Hx     Thyroid disease Neg Hx        Social History     Socioeconomic History    Marital status:      Spouse name: Not on file    Number of children: Not on file    Years of education: Not on file    Highest education level: Not on file   Occupational History    Not on file   Social Needs    Financial resource strain: Not on file    Food insecurity:      Worry: Not on file     Inability: Not on file    Transportation needs:     Medical: Not on file     Non-medical: Not on file   Tobacco Use    Smoking status: Current Every Day Smoker     Packs/day: 1.00     Years: 15.00     Pack years: 15.00     Types: Cigarettes    Smokeless tobacco: Never Used    Tobacco comment: 1 pack every other day   Substance and Sexual Activity    Alcohol use: No     Alcohol/week: 0.0 oz    Drug use: No    Sexual activity: Yes     Partners: Male     Birth control/protection: None   Lifestyle    Physical activity:     Days per week: Not on file     Minutes per session: Not on file    Stress: Not on file   Relationships    Social connections:     Talks on phone: Not on file     Gets together: Not on file     Attends Worship service: Not on file     Active member of club or organization: Not on file     Attends meetings of clubs or organizations: Not on file     Relationship status: Not on file   Other Topics Concern    Not on file   Social History Narrative    Not on file       Current Outpatient Medications   Medication Sig Dispense Refill    acetaZOLAMIDE (DIAMOX) 500 mg CpSR Take 1 capsule (500 mg total) by mouth 2 (two) times daily. 360 capsule 1    albuterol (PROVENTIL/VENTOLIN HFA) 90 mcg/actuation inhaler INHALE 2 PUFFS BY MOUTH EVERY 6 HOURS AS NEEDED FOR WHEEZING 54 g 0    albuterol-ipratropium (DUO-NEB) 2.5 mg-0.5 mg/3 mL nebulizer solution Take 3 mLs by nebulization every 6 (six) hours as needed for Wheezing or Shortness of Breath. Rescue 100 mL 3    blood sugar diagnostic Strp 1 each by Misc.(Non-Drug; Combo Route) route 4 (four) times daily before meals and nightly. ACCU CHEK ELVIA PLUS METER 200 each 3    blood-glucose meter (ACCU-CHEK ELVIA PLUS METER) Misc TEST FOUR TIMES DAILY BEFORE MEALS AND EVERY NIGHT 90 each 1    budesonide-formoterol 160-4.5 mcg (SYMBICORT) 160-4.5 mcg/actuation HFAA Inhale 2 puffs into the lungs every 12 (twelve) hours. Controller 1  Inhaler 5    capsaicin 0.1 % Crea Apply 1 application topically 2 (two) times daily. 56.6 g 1    diclofenac sodium (VOLTAREN) 1 % Gel Apply 2 g topically once daily. 100 g 3    fluticasone propionate (FLONASE) 50 mcg/actuation nasal spray 1 spray (50 mcg total) by Each Nare route once daily. 15.8 mL 2    GAVILYTE-G 236-22.74-6.74 -5.86 gram suspension   0    hydrOXYzine (ATARAX) 50 MG tablet Take 1-4 at night for itching. 120 tablet 3    lancets (ACCU-CHEK SOFTCLIX LANCETS) Misc 1 Device by Misc.(Non-Drug; Combo Route) route 4 (four) times daily. 200 each 3    levocetirizine (XYZAL) 5 MG tablet Take 1 tablet (5 mg total) by mouth every evening. 30 tablet 11    linaclotide (LINZESS) 290 mcg Cap Take 1 capsule (290 mcg total) by mouth once daily. 90 capsule 3    losartan (COZAAR) 100 MG tablet Take 1 tablet (100 mg total) by mouth once daily. 90 tablet 0    medroxyPROGESTERone (PROVERA) 10 MG tablet Take 1 tablet (10 mg total) by mouth once daily. TAKE DAY 1-12 Q MONTH 12 tablet 12    meloxicam (MOBIC) 15 MG tablet TAKE 1 TABLET(15 MG) BY MOUTH DAILY AS NEEDED FOR HEADACHE 90 tablet 1    metFORMIN (GLUCOPHAGE-XR) 500 MG 24 hr tablet Take 2 tablets (1,000 mg total) by mouth 2 (two) times daily with meals. 360 tablet 0    metroNIDAZOLE (FLAGYL) 500 MG tablet Take 1 tablet (500 mg total) by mouth 4 (four) times daily. Home medication      mometasone 0.1% (ELOCON) 0.1 % cream       montelukast (SINGULAIR) 10 mg tablet TAKE 1 TABLET(10 MG) BY MOUTH EVERY EVENING 30 tablet 0    nicotine polacrilex 2 MG Lozg Take 1 lozenge (2 mg total) by mouth as needed. 168 lozenge 0    ondansetron (ZOFRAN) 8 MG tablet Take 1 tablet (8 mg total) by mouth every 8 (eight) hours as needed for Nausea. 90 tablet 5    oxyCODONE-acetaminophen (PERCOCET)  mg per tablet TAKE 1 TABLET BY MOUTH EVERY 8 HOURS AS NEEDED. MAY CAUSE DROWSINESS  0    QUEtiapine (SEROQUEL) 25 MG Tab TAKE 1 TABLET(25 MG) BY MOUTH EVERY EVENING 90  tablet 0    sumatriptan (IMITREX) 50 MG tablet Take 1 tab at onset of headaches.  If no improvement in 2 hours, take another.  Do not take more than 2 tabs in 24 hours. 9 tablet 5    tiotropium (SPIRIVA) 18 mcg inhalation capsule Inhale 1 capsule (18 mcg total) into the lungs once daily. Controller 30 capsule 11    tiZANidine (ZANAFLEX) 4 MG tablet Take 4 mg by mouth every 8 (eight) hours.       topiramate (TOPAMAX) 100 MG tablet TAKE 3 TABLETS BY MOUTH TWICE DAILY 180 tablet 11    venlafaxine (EFFEXOR-XR) 75 MG 24 hr capsule TAKE 1 CAPSULE(75 MG) BY MOUTH EVERY EVENING 90 capsule 3    hydroCHLOROthiazide (HYDRODIURIL) 25 MG tablet Take 1 tablet (25 mg total) by mouth once daily. 30 tablet 11    pantoprazole (PROTONIX) 40 MG tablet Take 1 tablet (40 mg total) by mouth 2 (two) times daily. 180 tablet 3     No current facility-administered medications for this visit.        Review of patient's allergies indicates:  No Known Allergies      ROS  ROS:  Constitution: Negative for chills, fever, weakness and malaise/fatigue.   HEENT: Negative for headaches.   Cardiovascular: Negative for chest pain and claudication.   Respiratory: Negative for cough and shortness of breath.   Musculoskeletal: (+) for foot pain.  Negative for muscle cramps and muscle weakness.   Gastrointestinal: Negative for nausea and vomiting.   Neurological:(+) for numbness, tingling and paresthesias.   Dermatological:  - for skin rash, (--) for keratosis, (--) for fungal toenails, (--) for wound.          Objective:      Physical Exam  Constitutional:  Patient is oriented to person, place, and time. Vital signs are normal.  Appears well-developed and well-nourished.     Vascular:  Dorsalis pedis pulses are 1/4 on the right side, and 1/4 on the left side.   Posterior tibial pulses are 1/4 on the right side, and 1/4 on the left side.   (--) digital hair growth, capillary fill time to all toes <3 seconds, toes are cool touch, trace pedal  swelling    Skin/Dermatological:  Skin is warm and intact.  No cyanosis or clubbing.  No rashes noted.  No open wounds. Nails trim without thickening.  (--) keratosis     Musculoskeletal:      Pes planus, left > right plantar medial calcaneal tubercle pain.   Pedal rom within normal limits.  (+) ankle joint DF restriction with both knee flexed and extened.    Neurological:  (+) deficits to sharp/dull, light touch or vibratory sensation bilateral feet, ten points tested.   Muscle strength to tibialis anterior, extensor hallucis longus, extensor digitorum longus, peroneal muscles, flexor hallucis/digotorum longus, posterior tibial and gastrosoleal complex is 5/5, normal tone without assymmetry   Patellar reflexes are 2+ on the right side and 2+ on the left side.  Achilles reflexes are 2+ on the right side and 2+ on the left side.         EXAMINATION:  MRI FOOT (HINDFOOT) LEFT WITHOUT CONTRAST    CLINICAL HISTORY:  eval for plantar fasciitis;  Type 2 diabetes mellitus with other diabetic neurological complication    TECHNIQUE:  MRI left ankle performed per routine protocol without contrast.    COMPARISON:  Left foot radiograph 05/28/2017    FINDINGS:  Ligaments: Anterior and posterior inferior tibiofibular ligaments are intact.  Anterior talofibular, calcaneofibular and posterior talofibular ligaments are intact.  Deep and superficial components of deltoid ligament are intact.  Spring ligament complex and Lisfranc ligament are intact.    Tendons: Medial ankle flexor, dorsal ankle extensor, and peroneus tendons are intact.  The Achilles tendon appears thickened with increased signal which may reflect tendinosis or a partial tear.    Joints: Mild effusions at the tibiotalar and posterior subtalar joints.  Tibiotalar and subtalar cartilage maintained.    Bones:  No fracture or infiltrative process.  Mild degenerative change at the TMT joint with joint space narrowing and subchondral cystic change.    Miscellaneous: Sinus  tarsi and tarsal tunnel are unremarkable.  There is thickening and increased signal of the proximal plantar fascia with mild edema at its calcaneal insertion.  There is nonspecific circumferential subcutaneous edema about the distal left lower extremity and ankle.      Impression       Plantar fasciitis.    Achilles tendinosis versus partial tear.    Mild 5th tarsometatarsal degenerative change.    Nonspecific left lower extremity and ankle subcutaneous edema.    Electronically signed by resident: Mariam Butts  Date: 06/12/2019  Time: 16:32    Electronically signed by: Chrystal Vo MD  Date: 06/12/2019  Time: 16:47           Assessment:       Encounter Diagnoses   Name Primary?    Type II diabetes mellitus with neurological manifestations Yes    Plantar fasciitis     Pes valgus     Bunion     Hammer toe, unspecified laterality     Diabetes mellitus with peripheral circulatory disorder          Plan:       Yanni was seen today for foot pain, plantar fasciitis and diabetes mellitus.    Diagnoses and all orders for this visit:    Type II diabetes mellitus with neurological manifestations    Plantar fasciitis    Pes valgus    Bunion    Hammer toe, unspecified laterality    Diabetes mellitus with peripheral circulatory disorder      I counseled the patient on her conditions, their implications and medical management.    Shoe inspection. Diabetic Foot Education. Patient reminded of the importance of good nutrition and blood sugar control to help prevent podiatric complications of diabetes. Patient instructed on proper foot hygeine. We discussed wearing proper shoe gear, daily foot inspections, never walking without protective shoe gear, never putting sharp instruments to feet.  We also discussed padding and shoes with high toe boxes for foot deformities.      Reviewed MRI and pertinent findings.     Plantar fasciitis left foot: We discussed the etiology of the problem and the patient was given literature  regarding plantar fasciitis and stretching.  With the patient's permission, an injection of 12 mg of kenalog, 4 mg of dexamethasone phosphate and 1 cc of lidocaine 1% and 1 cc of marcaine 0.5% into the left medial plantar heel.  Patient tolerated well. We also discussed proper shoe gear.  .  Spenco orthotic supports were .  We discussed the possibility of another injection or physical therapy or surgery should this not resolve the problem.  Return to clinic 2 weeks.        Will Salas DPM

## 2019-07-08 ENCOUNTER — TELEPHONE (OUTPATIENT)
Dept: FAMILY MEDICINE | Facility: CLINIC | Age: 49
End: 2019-07-08

## 2019-07-09 RX ORDER — DEXAMETHASONE SODIUM PHOSPHATE 4 MG/ML
4 INJECTION, SOLUTION INTRA-ARTICULAR; INTRALESIONAL; INTRAMUSCULAR; INTRAVENOUS; SOFT TISSUE
Status: COMPLETED | OUTPATIENT
Start: 2019-07-09 | End: 2019-07-09

## 2019-07-09 RX ADMIN — DEXAMETHASONE SODIUM PHOSPHATE 4 MG: 4 INJECTION, SOLUTION INTRA-ARTICULAR; INTRALESIONAL; INTRAMUSCULAR; INTRAVENOUS; SOFT TISSUE at 01:07

## 2019-07-11 ENCOUNTER — INITIAL CONSULT (OUTPATIENT)
Dept: BARIATRICS | Facility: CLINIC | Age: 49
End: 2019-07-11
Payer: MEDICARE

## 2019-07-11 VITALS
WEIGHT: 293 LBS | SYSTOLIC BLOOD PRESSURE: 130 MMHG | BODY MASS INDEX: 50.02 KG/M2 | HEIGHT: 64 IN | HEART RATE: 79 BPM | DIASTOLIC BLOOD PRESSURE: 70 MMHG

## 2019-07-11 DIAGNOSIS — F17.200 TOBACCO USE DISORDER, MODERATE, DEPENDENCE: ICD-10-CM

## 2019-07-11 DIAGNOSIS — M48.061 SPINAL STENOSIS OF LUMBAR REGION, UNSPECIFIED WHETHER NEUROGENIC CLAUDICATION PRESENT: ICD-10-CM

## 2019-07-11 DIAGNOSIS — E78.2 COMBINED HYPERLIPIDEMIA ASSOCIATED WITH TYPE 2 DIABETES MELLITUS: ICD-10-CM

## 2019-07-11 DIAGNOSIS — I10 ESSENTIAL HYPERTENSION: ICD-10-CM

## 2019-07-11 DIAGNOSIS — E11.22 TYPE 2 DIABETES MELLITUS WITH STAGE 3 CHRONIC KIDNEY DISEASE, WITH LONG-TERM CURRENT USE OF INSULIN: ICD-10-CM

## 2019-07-11 DIAGNOSIS — Z79.4 TYPE 2 DIABETES MELLITUS WITH STAGE 3 CHRONIC KIDNEY DISEASE, WITH LONG-TERM CURRENT USE OF INSULIN: ICD-10-CM

## 2019-07-11 DIAGNOSIS — E66.01 CLASS 3 SEVERE OBESITY DUE TO EXCESS CALORIES WITH SERIOUS COMORBIDITY AND BODY MASS INDEX (BMI) OF 60.0 TO 69.9 IN ADULT: ICD-10-CM

## 2019-07-11 DIAGNOSIS — K21.9 GASTROESOPHAGEAL REFLUX DISEASE WITHOUT ESOPHAGITIS: ICD-10-CM

## 2019-07-11 DIAGNOSIS — F11.20 UNCOMPLICATED OPIOID DEPENDENCE: ICD-10-CM

## 2019-07-11 DIAGNOSIS — E11.69 COMBINED HYPERLIPIDEMIA ASSOCIATED WITH TYPE 2 DIABETES MELLITUS: ICD-10-CM

## 2019-07-11 DIAGNOSIS — N18.30 TYPE 2 DIABETES MELLITUS WITH STAGE 3 CHRONIC KIDNEY DISEASE, WITH LONG-TERM CURRENT USE OF INSULIN: ICD-10-CM

## 2019-07-11 PROCEDURE — 99999 PR PBB SHADOW E&M-EST. PATIENT-LVL III: CPT | Mod: PBBFAC,HCNC,, | Performed by: INTERNAL MEDICINE

## 2019-07-11 PROCEDURE — 3008F BODY MASS INDEX DOCD: CPT | Mod: HCNC,CPTII,S$GLB, | Performed by: INTERNAL MEDICINE

## 2019-07-11 PROCEDURE — 3044F PR MOST RECENT HEMOGLOBIN A1C LEVEL <7.0%: ICD-10-PCS | Mod: HCNC,CPTII,S$GLB, | Performed by: INTERNAL MEDICINE

## 2019-07-11 PROCEDURE — 3075F PR MOST RECENT SYSTOLIC BLOOD PRESS GE 130-139MM HG: ICD-10-PCS | Mod: HCNC,CPTII,S$GLB, | Performed by: INTERNAL MEDICINE

## 2019-07-11 PROCEDURE — 3044F HG A1C LEVEL LT 7.0%: CPT | Mod: HCNC,CPTII,S$GLB, | Performed by: INTERNAL MEDICINE

## 2019-07-11 PROCEDURE — 3078F PR MOST RECENT DIASTOLIC BLOOD PRESSURE < 80 MM HG: ICD-10-PCS | Mod: HCNC,CPTII,S$GLB, | Performed by: INTERNAL MEDICINE

## 2019-07-11 PROCEDURE — 99999 PR PBB SHADOW E&M-EST. PATIENT-LVL III: ICD-10-PCS | Mod: PBBFAC,HCNC,, | Performed by: INTERNAL MEDICINE

## 2019-07-11 PROCEDURE — 99215 PR OFFICE/OUTPT VISIT, EST, LEVL V, 40-54 MIN: ICD-10-PCS | Mod: HCNC,S$GLB,, | Performed by: INTERNAL MEDICINE

## 2019-07-11 PROCEDURE — 99215 OFFICE O/P EST HI 40 MIN: CPT | Mod: HCNC,S$GLB,, | Performed by: INTERNAL MEDICINE

## 2019-07-11 PROCEDURE — 3075F SYST BP GE 130 - 139MM HG: CPT | Mod: HCNC,CPTII,S$GLB, | Performed by: INTERNAL MEDICINE

## 2019-07-11 PROCEDURE — 3008F PR BODY MASS INDEX (BMI) DOCUMENTED: ICD-10-PCS | Mod: HCNC,CPTII,S$GLB, | Performed by: INTERNAL MEDICINE

## 2019-07-11 PROCEDURE — 3078F DIAST BP <80 MM HG: CPT | Mod: HCNC,CPTII,S$GLB, | Performed by: INTERNAL MEDICINE

## 2019-07-11 RX ORDER — PANTOPRAZOLE SODIUM 40 MG/1
40 TABLET, DELAYED RELEASE ORAL DAILY
Refills: 3 | COMMUNITY
Start: 2019-06-01 | End: 2021-03-08

## 2019-07-11 NOTE — PATIENT INSTRUCTIONS
Start Ozempic once a week. Start with 0.25mg once a week x 4 weeks, then 0.5 mg weekly.     Decrease portions as soon as you start Ozempic. Some nausea in the first 2 weeks is not unusual.     If you get pain across the upper abdomen and around to your back, please call the office.     Contact your health insurance about your free gym membership, specifically if there is a location with a pool.     Increase low impact activity as tolerated.  Avoid high impact activity, very heavy lifting or other exercise regimens that may cause discomfort.       Protein and fiber in every meal.    3 meals a day made up of the following:  Unlimited green vegetables, tomatoes, mushrooms, spaghetti squash, cauliflower, meat, poultry, seafood, eggs and hard cheeses.   Milk and plain yogurt  Dressings, seasonings, condiments, etc should have less than 2 g sugars.   Beans (1-1.5 cups) or nuts (1/4 cup) can have 1 x a day.   1-2 servings of citrus fruits, berries, pineapple or melon a day (1/2 cup)  Avoid fried foods    Avoid/limt sweets, grains, rice, pasta, potatoes, bread, corn, peas, oatmeal, grits, tortillas, crackers, chips    No soda, sweet tea, juices or lemonade. This including added sugar in coffee and tea. All drinks should be 5 calories or less.     Www.dietdoctor.com for recipes. Moderate carb intake      Meal Ideas for Regular Bariatric Diet  *Recipes and products available at www.bariatriceating.com      Breakfast: (15-20g protein)    - Egg white omelet: 2 egg whites or ½ cup Egg Beaters. (Optional proteins: cheese, shrimp, black beans, chicken, sliced turkey) (Optional veggies: tomatoes, salsa, spinach, mushrooms, onions, green peppers, or small slice avocado)     - Egg and sausage: 1 egg or ¼ cup Egg Beaters (any variety), with 1 amanda or 2 links of Turkey sausage or Veggie breakfast sausage (IndaBox or Atmosferiq)    - Crust-less breakfast quiche: To make a glass pie dish, mix 4oz part skim Ricotta, 1 cup skim milk,  and 2 eggs as your base. Add protein: shredded cheese, sliced lean ham or turkey, turkey davidson/sausage. Add veggies: tomato, onion, green onion, mushroom, green pepper, spinach, etc.    - Yogurt parfait: Mix 1 - 6oz container Dannon Light N Fit vanilla yogurt, with ¼ cup Kashi Go Lean cereal    - Cottage cheese and fruit: ½ cup part-skim cottage cheese or ricotta cheese topped with fresh fruit or sugar free preserves     - Julienne Darnell's Vanilla Egg custard* (add 2 Tbsp instant coffee granules to make Cappuccino Custard*)    - Hi-Protein café latte (skim milk, decaf coffee, 1 scoop protein powder). Optional to add Sugar free syrup or extract flavoring.    Lunch: (20-30g protein)    - ½ cup Black bean soup (Homemade or Progresso), with ¼ cup shredded low-fat cheese. Top with chopped tomato or fresh salsa.     - Lean deli turkey breast and low-fat sliced cheese, mustard or light loredo to moisten, rolled up together, or wrapped in a Chon lettuce leaf    - Chicken salad made from dinner leftovers, moisten with low-fat salad dressing or light loredo. Also try leftover salmon, shrimp, tuna or boiled eggs. Serve ½ cup over dark green salad    - Fat-free canned refried beans, topped with ¼ cup shredded low-fat cheese. Top with chopped tomato or fresh salsa.     - Greek salad: Top mixed greens with 1-2oz grilled chicken, tomatoes, red onions, 2-3 kalamata olives, and sprinkle lightly with feta cheese. Spritz with Balsamic vinegar to taste.     - Crust-less lunch quiche: To make a glass pie dish, mix 4oz part skim Ricotta, 1 cup skim milk, and 2 eggs as your base. Add protein: shredded cheese, sliced lean ham or turkey, shrimp, chicken. Add veggies: tomato, onion, green onion, mushroom, green pepper, spinach, artichoke, broccoli, etc.    - Pizza bake: tomato sauce, low-fat shredded mozzarella and turkey pepperoni or Moroccan davidson. Add any veggies.    - Cucumber crab bites: Spread ¼ cup crab dip (lump crabmeat + light cream  cheese and green onions) over sliced cucumber.     - Chicken with light spinach and artichoke dip*: Puree in : 6oz cooked and drained spinach, 2 cloves garlic, 1 can cannelloni beans, ½ cup chopped green onions, 1 can drained artichoke hearts (not marinated in oil), lemon juice and basil. Mix in 2oz chopped up chicken.    Supper: (20-30g protein)    - Serve grilled fish over dark green salad tossed with low-fat dressing, served with grilled asparagus lucas     - Rotisserie chicken salad: served with sliced strawberries, walnuts, fat-free feta cheese crumbles and 1 tbsp Pedrozas Own Light Raspberry [place] Boro Vinaigrette    - Shrimp cocktail: Dip cold boiled shrimp in homemade low-sugar cocktail sauce (1/2 cup Alec One Carb ketchup, 2 tbsp horseradish, 1/4 tsp hot sauce, 1 tsp Worcestershire sauce, 1 tbsp freshly-squeezed lemon juice). Serve with dark green salad, walnuts, and crumbled blue cheese drizzled with olive oil and Balsamic vinegar    - Tuna Melt: Spread tuna salad onto 2 thick slices of tomato. Top with low-fat cheese and broil until cheese is melted. May also be made with chicken salad of shrimp salad. [place] with different types of cheeses.    - Homemade low-fat Chili using extra lean ground beef or ground turkey. Top with shredded cheese and salsa as desired. May add dollop fat-free sour cream if desired    - Dinner Omelet with shrimp or chicken and onion, green peppers and chives.    - No noodle lasagna: Use sliced zucchini or eggplant in place of noodles.  Layer with part skim ricotta cheese and low sugar meat sauce (use very lean ground beef or ground turkey).    - Mexican chicken bake: Bake chunks of chicken breast or thigh with taco seasoning, Pace brand enchilada sauce, green onions and low-fat cheese. Serve with ¼ cup black beans or fat free refried beans topped with chopped tomatoes or salsa.    - Luis frozen meatballs, simmered in Classico Marinara sauce. Different flavors  of salsa or spaghetti sauce create different dishes! Sprinkle with parmesan cheese. Serve with grilled or steamed veggies, or a dark green salad.    - Simmer boneless skinless chicken thigh chunks in Classico Marinara sauce or roasted salsa until tender with chopped onion, bell pepper, garlic, mushrooms, spinach, etc.     - Hamburger, without the bun, dressed the way you like. Served with grilled or steamed veggies.    - Eggplant parmesan: Bake slices of eggplant at 350 degrees for 15 minutes. Layer tomato sauce, sliced eggplant and low-fat mozzarella cheese in a baking dish and cover with foil. Bake 30-40 more minutes or until bubbly. Uncover and bake at 400 degrees for about 15 more minutes, or until top is slightly crisp.    - Fish tacos: grilled/baked white fish, wrapped in Chon lettuce leaf, topped with salsa, shredded low-fat cheese, and light coleslaw.    Snacks: (100-200 calories; >5g protein)    - 1 low-fat cheese stick with 8 cherry tomatoes or 1 serving fresh fruit  - 4 thin slices fat-free turkey breast and 1 slice low-fat cheese  - 4 thin slices fat-free honey ham with wedge of melon  - 1/4 cup unsalted nuts with ½ cup fruit  - 6-oz container Dannon Light n Fit vanilla yogurt, topped with 1oz unsalted nuts         - apple, celery or baby carrots spread with 2 Tbsp natural peanut butter or almond butter   - apple slices with 1 oz slice low-fat cheese  - celery, cucumber, bell pepper or baby carrots dipped in ¼ cup hummus bean spread or light spinach and artichoke dip (*recipe in lunch section)  - 100 calorie bag microwave light popcorn with 3 tbsp grated parmesan cheese  - Sourav Links Beef Steak - 14g protein! (similar to beef jerky)  - 2 wedges [place] Cow - Light Herb & Garlic Cheese with sliced cucumber or green bell pepper  - 1/2 cup low-fat cottage cheese with ¼ cup fruit or ¼ cup salsa  - RTD Protein drinks: Atkins, Low Carb Slim Fast, EAS light, Muscle Milk Light, etc.  - Homemade Protein  drinks: [facility] Soy95, Isopure, Nectar, UNJURY, Whey Gourmet, etc. Mix 1 scoop powder with 8oz skim/1% milk or light soymilk.  - Protein bars: Atkins, EAS, Pure Protein, Think Thin, Detour, etc. Must have 0-4 grams sugar - Read the label.    Takeout Options: No more than twice/week  Deli - Salads (no pasta or rice), meats, cheeses. Roasted chicken. Lox (salmon)    Mexican - Platters which don't include tortillas, chips, or rice. Go easy on the beans. Example: Fajitas without the tortillas. Ask the  not to bring chips to the table if they are too tempting.    Greek - Meat or fish and vegetable, but no bread or rice. Including hummus, baba ganoush, etc, is OK. Most sit-down Greek restaurants can provide you with cucumber slices for dipping instead of carter bread.    Fast Food (Avoid as much as possible) - Salads (no croutons and limit salad dressing to 2 tbsp), grilled chicken sandwich without the bun and ask for no loredo. Americas low fat chili or Taco Bell pintos and cheese.    BBQ - The meats are fine if you ask for sauces on the side, but most of the traditional side dishes are loaded with carbs. Nito slaw, baked beans and BBQ sauce are typically made with sugar.    Chinese - Nothing deep-fried, no rice or noodles. Many Chinese sauces have starch and sugar in them, so you'll have to use your judgement. If you find that these sauces trigger cravings, or cause Dumping, you can ask for the sauce to be made without sugar or just use soy sauce.

## 2019-07-11 NOTE — LETTER
July 15, 2019      Alejandro Díaz MD  [address] [place] [address]           [facility]  [address]  [facility] [address]  Phone: [phone]  Fax: [phone]          Patient: Yanni Kaiser   MR Number: [id number]   YOB: 1970   Date of Visit: 7/11/2019       Dear Dr. Alejandro Díaz:    Thank you for referring Yanni Kaiser to me for evaluation. Attached you will find relevant portions of my assessment and plan of care.    If you have questions, please do not hesitate to call me. I look forward to following Yanni Kaiser along with you.    Sincerely,    Deepa Salcedo MD    Enclosure  CC:  No Recipients    If you would like to receive this communication electronically, please contact [email or web address] or [phone] to request more information on [employer] Link access.    For providers and/or their staff who would like to refer a patient to [facility], please contact us through our one-stop-shop provider referral line, [facility], at [phone].    If you feel you have received this communication in error or would no longer like to receive these types of communications, please e-mail [email or web address]

## 2019-07-11 NOTE — PROGRESS NOTES
Subjective:       Patient ID: Yanni Kaiser is a 48 y.o. female.    Chief Complaint: Consult    CC: Weight- States Dr. Díaz is concerned about it    Current attempts at weight loss: New pt to me, referred by Alejandro Díaz MD  [address] [facility] [place] [address] , with Patient Active Problem List:     SHARATH (obstructive sleep apnea)     Leg varices     Low back pain     Migraine     Morbid obesity with BMI of 60.0-69.9, adult     Tobacco use disorder, moderate, dependence     Anemia of chronic disease     Mild protein malnutrition     Weakness     Allergic rhinitis     Idiopathic intracranial hypertension     Essential hypertension     Combined hyperlipidemia associated with type 2 diabetes mellitus     Hyperlipidemia     GERD (gastroesophageal reflux disease)     Epilepsy     Plantar fasciitis, bilateral     Muscle tightness     Muscle weakness     Type 2 diabetes mellitus with stage 3 chronic kidney disease, with long-term current use of insulin- A1c increasing on Metfomin     Recurrent major depressive disorder, in full remission     Other chronic pain     Suicide attempt- Patient has hx of attempted suicide x3, with the last episode being approximately 7 years ago     Opioid dependence     Lumbar stenosis     Chest pain     Foot pain, bilateral     Antalgic gait     Dysfunctional uterine bleeding     DUB (dysfunctional uterine bleeding)     S/P D&C (status post dilation and curettage)     Simple endometrial hyperplasia     Anemia, iron deficiency     Refractive error     Lab Results       Component                Value               Date                       HGBA1C                   6.5 (H)             03/20/2019                 HGBA1C                   5.8 (H)             07/30/2018                 HGBA1C                   5.8 (H)             01/22/2018            Lab Results       Component                Value               Date                       MICROALBUR               7.2                "  04/01/2014                 LDLCALC                  136.0               01/30/2019                 CREATININE               1.2                 03/21/2019                     Previous diet attempts: Eating better. Going to nutritionist. OTC supplements.     History of medication for loss:   checked today. On chronic narcotics. On 300 mg topiramate and metformin currently.     Heaviest weight: 372#    Lightest weight: 200#, but not too sure.     Goal weight:  200#      Last eye exam:           March 2019. No glaucoma                            Provider:    Typical eating patterns: Disabled. Lives with  and daughter.  cooks. He has DM (sees me- on LCHF diet). Does not eat pork.   Breakfast: watermelon. May skip.     Lunch: may skip. May go out- Icelandic food- librado.     Dinner: if cooking- chicken with a vegetable.     Snacks: denies.     Beverages: snapple, tea and water. Coffee sometimes with creamer and sugar. Denies ETOH.     Willingness to change:  10/10    EKG:Normal sinus rhythm  Nonspecific T wave abnormality  Abnormal ECG  When compared with ECG of 16-NOV-2018 09:21,  No significant change was found    BMR: [id number]    Review of Systems   Constitutional: Negative for chills and fever.   Respiratory: Positive for apnea and shortness of breath.         Does not use CPAP machine. States did not like it.    Cardiovascular: Positive for chest pain and leg swelling.        With asthma sx   Gastrointestinal: Positive for constipation and diarrhea.        + GERD   Endocrine: Positive for cold intolerance.   Genitourinary: Negative for difficulty urinating and dysuria.           Musculoskeletal: Positive for arthralgias and back pain.   Neurological: Positive for dizziness and light-headedness.   Psychiatric/Behavioral: Positive for dysphoric mood. The patient is not nervous/anxious.        Objective:     /70   Pulse 79   Ht 5' 4" (1.626 m)   Wt (!) 168.9 kg (372 lb 5.7 oz)   LMP  (LMP " Unknown)   BMI 63.91 kg/m²     Physical Exam   Constitutional: She is oriented to person, place, and time. She appears well-developed. No distress.   Morbidly obese     HENT:   Head: Normocephalic and atraumatic.   Eyes: Pupils are equal, round, and reactive to light. EOM are normal. No scleral icterus.   Neck: Normal range of motion. Neck supple. No thyromegaly present.   Cardiovascular: Normal rate and normal heart sounds. Exam reveals no gallop and no friction rub.   No murmur heard.  Pulmonary/Chest: Effort normal and breath sounds normal. No respiratory distress. She has no wheezes.   Abdominal: Soft. Bowel sounds are normal. She exhibits no distension. There is no tenderness.   Musculoskeletal: Normal range of motion. She exhibits no edema.   Neurological: She is alert and oriented to person, place, and time. No cranial nerve deficit.   Skin: Skin is warm and dry. No erythema.   Psychiatric: She has a normal mood and affect. Her behavior is normal. Judgment normal.   Vitals reviewed.      Assessment:       1. Class 3 severe obesity due to excess calories with serious comorbidity and body mass index (BMI) of 60.0 to 69.9 in adult    2. Type 2 diabetes mellitus with stage 3 chronic kidney disease, with long-term current use of insulin    3. Essential hypertension    4. Gastroesophageal reflux disease without esophagitis    5. Spinal stenosis of lumbar region, unspecified whether neurogenic claudication present    6. Uncomplicated opioid dependence    7. Combined hyperlipidemia associated with type 2 diabetes mellitus    8. Tobacco use disorder, moderate, dependence        Plan:         1. Class 3 severe obesity due to excess calories with serious comorbidity and body mass index (BMI) of 60.0 to 69.9 in adult  Contact your health insurance about your free gym membership, specifically if there is a location with a pool.     Increase low impact activity as tolerated.  Avoid high impact activity, very heavy lifting  or other exercise regimens that may cause discomfort.       Protein and fiber in every meal.    3 meals a day made up of the following:  Unlimited green vegetables, tomatoes, mushrooms, spaghetti squash, cauliflower, meat, poultry, seafood, eggs and hard cheeses.   Milk and plain yogurt  Dressings, seasonings, condiments, etc should have less than 2 g sugars.   Beans (1-1.5 cups) or nuts (1/4 cup) can have 1 x a day.   1-2 servings of citrus fruits, berries, pineapple or melon a day (1/2 cup)  Avoid fried foods    Avoid/limt sweets, grains, rice, pasta, potatoes, bread, corn, peas, oatmeal, grits, tortillas, crackers, chips    No soda, sweet tea, juices or lemonade. This including added sugar in coffee and tea. All drinks should be 5 calories or less.     [email or web address] for recipes. Moderate carb intake          2. Type 2 diabetes mellitus with stage 3 chronic kidney disease, with long-term current use of insulin    - semaglutide (OZEMPIC) 0.25 mg or 0.5 mg(2 mg/1.5 mL) PnIj; Inject 0.5 mg into the skin every 7 days.  Dispense: 1.5 mL; Refill: 3  Start Ozempic once a week. Start with 0.25mg once a week x 4 weeks, then 0.5 mg weekly.     Decrease portions as soon as you start Ozempic. Some nausea in the first 2 weeks is not unusual.     If you get pain across the upper abdomen and around to your back, please call the office.       3. Essential hypertension  The current medical regimen is effective;  continue present plan and medications. Expect improvement with weight loss.     4. Gastroesophageal reflux disease without esophagitis  Expect improvement with weight loss  Attempt to wean PPI once 10% TBW lost.        6. Spinal stenosis of lumbar region, unspecified whether neurogenic claudication present  Increase low impact activity as tolerated.  Avoid high impact activity, very heavy lifting or other exercise regimens that may cause discomfort.      7. Uncomplicated opioid dependence  Avoid Contrave     8.  Combined hyperlipidemia associated with type 2 diabetes mellitus  Expect improvement with weight loss. Recheck after 10% TBW lost.      9. Tobacco use disorder, moderate, dependence  Did discuss with pt that she could consider weight loss surgery, but she would need to quit smoking.

## 2019-07-16 ENCOUNTER — HOSPITAL ENCOUNTER (OUTPATIENT)
Dept: RADIOLOGY | Facility: HOSPITAL | Age: 49
Discharge: HOME | End: 2019-07-16
Attending: FAMILY MEDICINE
Payer: MEDICARE

## 2019-07-16 DIAGNOSIS — Z12.31 ENCOUNTER FOR SCREENING MAMMOGRAM FOR MALIGNANT NEOPLASM OF BREAST: ICD-10-CM

## 2019-07-16 PROCEDURE — 77067 SCR MAMMO BI INCL CAD: CPT | Mod: TC,HCNC,PO

## 2019-07-16 PROCEDURE — 77063 MAMMO DIGITAL SCREENING BILAT WITH TOMOSYNTHESIS_CAD: ICD-10-PCS | Mod: 26,HCNC,, | Performed by: RADIOLOGY

## 2019-07-16 PROCEDURE — 77063 BREAST TOMOSYNTHESIS BI: CPT | Mod: 26,HCNC,, | Performed by: RADIOLOGY

## 2019-07-16 PROCEDURE — 77067 SCR MAMMO BI INCL CAD: CPT | Mod: 26,HCNC,, | Performed by: RADIOLOGY

## 2019-07-16 PROCEDURE — 77067 MAMMO DIGITAL SCREENING BILAT WITH TOMOSYNTHESIS_CAD: ICD-10-PCS | Mod: 26,HCNC,, | Performed by: RADIOLOGY

## 2019-07-29 ENCOUNTER — OFFICE VISIT (OUTPATIENT)
Dept: GASTROENTEROLOGY | Facility: CLINIC | Age: 49
End: 2019-07-29
Payer: MEDICARE

## 2019-07-29 VITALS
HEIGHT: 64 IN | WEIGHT: 293 LBS | RESPIRATION RATE: 16 BRPM | HEART RATE: 63 BPM | DIASTOLIC BLOOD PRESSURE: 100 MMHG | SYSTOLIC BLOOD PRESSURE: 146 MMHG | BODY MASS INDEX: 50.02 KG/M2

## 2019-07-29 DIAGNOSIS — R13.19 ESOPHAGEAL DYSPHAGIA: ICD-10-CM

## 2019-07-29 DIAGNOSIS — R07.89 OTHER CHEST PAIN: ICD-10-CM

## 2019-07-29 DIAGNOSIS — K59.00 CONSTIPATION, UNSPECIFIED CONSTIPATION TYPE: ICD-10-CM

## 2019-07-29 DIAGNOSIS — R11.2 NAUSEA AND VOMITING, INTRACTABILITY OF VOMITING NOT SPECIFIED, UNSPECIFIED VOMITING TYPE: ICD-10-CM

## 2019-07-29 DIAGNOSIS — G47.00 INSOMNIA, UNSPECIFIED TYPE: Primary | ICD-10-CM

## 2019-07-29 DIAGNOSIS — K21.9 GASTROESOPHAGEAL REFLUX DISEASE, ESOPHAGITIS PRESENCE NOT SPECIFIED: ICD-10-CM

## 2019-07-29 DIAGNOSIS — R14.0 BLOATING: ICD-10-CM

## 2019-07-29 DIAGNOSIS — G47.33 OSA (OBSTRUCTIVE SLEEP APNEA): ICD-10-CM

## 2019-07-29 DIAGNOSIS — R10.10 UPPER ABDOMINAL PAIN: ICD-10-CM

## 2019-07-29 PROCEDURE — 99499 RISK ADDL DX/OHS AUDIT: ICD-10-PCS | Mod: HCNC,S$GLB,, | Performed by: NURSE PRACTITIONER

## 2019-07-29 PROCEDURE — 3077F SYST BP >= 140 MM HG: CPT | Mod: HCNC,CPTII,S$GLB, | Performed by: NURSE PRACTITIONER

## 2019-07-29 PROCEDURE — 3080F PR MOST RECENT DIASTOLIC BLOOD PRESSURE >= 90 MM HG: ICD-10-PCS | Mod: HCNC,CPTII,S$GLB, | Performed by: NURSE PRACTITIONER

## 2019-07-29 PROCEDURE — 99499 UNLISTED E&M SERVICE: CPT | Mod: HCNC,S$GLB,, | Performed by: NURSE PRACTITIONER

## 2019-07-29 PROCEDURE — 99215 OFFICE O/P EST HI 40 MIN: CPT | Mod: HCNC,S$GLB,, | Performed by: NURSE PRACTITIONER

## 2019-07-29 PROCEDURE — 99215 PR OFFICE/OUTPT VISIT, EST, LEVL V, 40-54 MIN: ICD-10-PCS | Mod: HCNC,S$GLB,, | Performed by: NURSE PRACTITIONER

## 2019-07-29 PROCEDURE — 3080F DIAST BP >= 90 MM HG: CPT | Mod: HCNC,CPTII,S$GLB, | Performed by: NURSE PRACTITIONER

## 2019-07-29 PROCEDURE — 3008F BODY MASS INDEX DOCD: CPT | Mod: HCNC,CPTII,S$GLB, | Performed by: NURSE PRACTITIONER

## 2019-07-29 PROCEDURE — 99999 PR PBB SHADOW E&M-EST. PATIENT-LVL V: CPT | Mod: PBBFAC,HCNC,, | Performed by: NURSE PRACTITIONER

## 2019-07-29 PROCEDURE — 99999 PR PBB SHADOW E&M-EST. PATIENT-LVL V: ICD-10-PCS | Mod: PBBFAC,HCNC,, | Performed by: NURSE PRACTITIONER

## 2019-07-29 PROCEDURE — 3008F PR BODY MASS INDEX (BMI) DOCUMENTED: ICD-10-PCS | Mod: HCNC,CPTII,S$GLB, | Performed by: NURSE PRACTITIONER

## 2019-07-29 PROCEDURE — 3077F PR MOST RECENT SYSTOLIC BLOOD PRESSURE >= 140 MM HG: ICD-10-PCS | Mod: HCNC,CPTII,S$GLB, | Performed by: NURSE PRACTITIONER

## 2019-07-29 RX ORDER — ONDANSETRON HYDROCHLORIDE 8 MG/1
8 TABLET, FILM COATED ORAL EVERY 8 HOURS PRN
Qty: 90 TABLET | Refills: 5 | Status: SHIPPED | OUTPATIENT
Start: 2019-07-29 | End: 2020-03-23 | Stop reason: SDUPTHER

## 2019-07-29 NOTE — LETTER
July 31, 2019      Carlyle Gordillo MD  [address]  [place] [address]           [facility] - Gastroenterology  [address]  [place] [address]  Phone: [phone]  Fax: [phone]          Patient: Yanni Kaiser   MR Number: [id number]   YOB: 1970   Date of Visit: 7/29/2019       Dear Dr. Carlyle KELLY Page:    Thank you for referring Yanni Kaiser to me for evaluation. Attached you will find relevant portions of my assessment and plan of care.    If you have questions, please do not hesitate to call me. I look forward to following Yanni Kaiser along with you.    Sincerely,    Ciara Pinto, NP    Enclosure  CC:  No Recipients    If you would like to receive this communication electronically, please contact [email or web address] or [phone] to request more information on [employer] Link access.    For providers and/or their staff who would like to refer a patient to [facility], please contact us through our one-stop-shop provider referral line, [facility] , at [phone].    If you feel you have received this communication in error or would no longer like to receive these types of communications, please e-mail [email or web address]

## 2019-07-29 NOTE — PROGRESS NOTES
[facility] Consultation Note    Reason for Consult:    Chief Complaint   Patient presents with    Heartburn    Chest Pain    Dysphagia    Nausea    Abdominal Pain    Diarrhea    Constipation    Bloated    Gas         PCP:   Carlyle Gordillo       Referring MD:  Flako Trejo Md  [address] [place] [address]  [address]  [facility]  [place] [address]    HPI:  Yanni Kaiser is a 48 y.o. female with a PMH of anemia, arthritis, back pain, plantar fascitis, asthma,  anxiety, MDD, DM2 , HTN, migraines, seizures, SHARATH referred to motility clinic for second opinion regarding the following problems:    GERD. Bothersome reflux. Has not tried gaviscon w alginate as previously advised  Taking protonix 40 mg BID  Retrosternal pyrosis:yes  Regurgitation:yes  Belching:yes  Onset: over 5 years  Frequency: every day    Chest pain. Some improvement, but still occuring  Frequency: no longer daily; now occurring 2-3 days weekly  Improvement with belching  Cardiology jameson negative.  Stress test negative. Cards does not think this is cardiac     Dysphagia.  Still w difficulty swallowing.  Unchanged.  Onset of symptoms:5 yrs  Problems with solids:mostly  Location: upper esophagus  Frequency:  Almost daily  Attempting swallow precautions.    Nausea.  Daily, w every meal, unchanged  Early satiety. daily  No vomiting  Onset:over 5 years agp    Diet: trying to follow GP diet. Seen by dietitian     MEDS:   zofran almost  daily prn, which helps  Did not try FDguard as previously advised     Gastroparesis diagnosed:  Possibly in 2016 with smartpill    Number of GP related hospitalization in the past year: none  Number of GP related ED visit in the past year: none    Curenlty on following medications that may affect gastric emptying:   Narcotics (morphine, oxycodone, tapentadol, less with tramadol):percocet 5-325 mg once weekly for planar fasciitis since 2/2018.  Tries to  "limit     DM 2. Last A1C increased 6.5 (PT thinks possibly related to steroid use w asthma episode)  Dx 10 year ago.   BS running up to 90s.   Previously followed by endocrinologist.   Taking metformin 1,000 mg BID    Abdominal pain.   LUQ pain previously resolved after course of abx for suspected diverticulitis.  Now with epigastric and LUQ abd pain occurring almost daily, not as severe   Previously improved w hp treatment and Linzess   Onset:over 5 yrs ago  Using narcotic pain medication: percocet  Nsaids: meloxicam 15 mg HS - have not been using   Has not tried FDgard as previously advised    Gas and bloating. unchanged   No improvement with eliminating dairy.   Bloating: yes  Excessive gas: yes  Stopped probiotic    Constipation. Improved.  No longer gets diarrhea   Creola 4 or 3, previously type 1  BM 4 days per week   Still feels that is not evacuating competely   5 years ago  Some  improvement with Linzess 290 mcg daily and miralax few days weekly\  No longer taking probiotic    Weight fluctuates. Protein low. Is not taking protein shakes TID as advised d/t "tasted bad".    H/o h. Pylori in 2016. Unsure if treated in 2016. Hp stool + 2018. Treated w doxy, flagyl, pepto and ppi x 14 days recently.  Completed the entire course. Pain improved somewhat, but not completely resoled.  Has not turned in stool sample to test for cure as previously advised.    Anemia. No over bleeding.  No vaginal bleeding. EGD/colon negative.    Diverticulitis on CT in 11/2018. Treated with abx. Subsequent colonoscopy okay. Maybe this made sx better     Fatty liver.     Family history of colon cancer. Mother dx in 50s, MGM dx ?    Anxiety. Depression.   Some improvement with seroquel 25 mg HS, also takes effexor 75 mg HS (for HAs).   Followed by PCP. Has seen psych in the past.     Insomnia. Not on meds. Diagnosed with SHARATH. Not on cpap/does not like it.     Migraines. Seizures. Some improvement with tompiramate 300 mg  BID, meloxicam " 15 mg daily, imitrex 50 mg PRN, also taking effexor 75 mg HS, and aimovig once monthly inj. Per neruology.    Plantar fascitis. Taking percocet 5-325 mg once weekly since 2/2018. Was on a different narcotic for one month prior to this. Followed by pain management and podietry    Joint pain. Joint swelling. Knees. Hands. Back.  Seen by rheumatology w/ negative work up.    Denies diarrhea, BRBPR, melena    Total visit time was 40 minutes, more than 50% of which was spent in face-to-face counseling with patient regarding symptoms, diagnostic results, prognosis, risks and benefits of treatment options, instructions for management, importance of compliance with chosen treatment options, risk factor reduction, stress reduction, coping strategies.      Previous Studies:   Esophagram 06/12/2019:  Mild esophageal dysmotility.  13 mm barium tablet passed into the stomach after multiple sips of water and mildly delayed transit the GEJ.  Colonoscopy 1/14/19: EPTC. Hemorrhoids. 3 mm cecal polyp (TA). Med lipoma 14 mm in AC. Recto sigmoid and cecal tics Two 3-5 mm SC polyps (hp). Multiple small polyps in rectum and recto sigmoid colon (hp). Rpt 5 yrs.   EGD 1/14/19: nl esophagus (-). Clear gastric fluid. Erosive gastropathy (-). Nl duodenum (-).   Ct a/p 11/6/18: Slight pericolonic fat stranding at the junction of the sigmoid and descending colon concerning for mild uncomplicated diverticulitis or epiploic appendagitis.Other incidental findings as above including cholecystectomy, mild focal fatty infiltration liver, tiny splenule, and osseous degenerative changes.  EGD 12/9/16: nl esophagus. Gastric erythema and granularity (gastritis + hpylori). Nl duodenum.   SmartPill 10/5/16: GET delayed 5 hr 50 min, sbtt nl 5 hr 8min, CTT nl 12 hr 13 min, SLBTT nl 19 hr 22 min, WGTT nl 25 hr 13 min  CT abd/pelvis 4/29/16: unremarkable  * colon 2016:?  Abd us 3/11/08: technically limited study. fatty liver. s/p nichole  Abd us 3/5/08: GB stones  w/ + bullards sign.     Labs:  10/10/18: hgb low 11.6, RDW high 16.1, BMP unremarkable  18 : vit d low 12  18: TSH nl  18: hgb low 11.3, RDW high 16.8, cl high 113, albumin low 3.1  CCP ab IgG-  RF-  Taylor-  Sjogrens taylor -  Hep A ab IgG+  Hep c ab-  Hep b s ab-  Hep b c ab-  Hep b sag-  Quant gold tb -    Relevant Surgeries:  Cholecystectomy (3/7/2008)    * per pt report    ROS:  ROS   Constitutional: no fever, nochills, +night sweats, no weight loss  ENT: no congestion,no  rhinorrhea, + chronic sinus problems  CV: +Chest pain, no palpitations  Pulm: Nocough, + shortness of breath  Ophtho: + blurry vision, no eye redness  GI: see HPI  Derm: No rash  Heme: No lymphadenopathy, + bruising  MSK: + joint pain, + joint swelling, no Raynauds  : + dysuria, + frequent urination, no blood in urine  Endo: + hot or cold intolerance  Neuro: +dizziness, no syncope, no seizure  Psych: No anxiety, + depression        Medical History:   Past Medical History:   Diagnosis Date    Allergy     Anemia     Asthma     Cigarette smoker     Depression     Diabetes with neurologic complications     GERD (gastroesophageal reflux disease)     High cholesterol     Hyperprolactinemia     Hypertension     Obese body habitus     Pseudotumor cerebri     Seizures     Simple endometrial hyperplasia 2018    Sleep apnea     Smoker         Surgical History:   Past Surgical History:   Procedure Laterality Date    BREAST CYST ASPIRATION       SECTION      X 1    CHOLECYSTECTOMY      COLONOSCOPY      COLONOSCOPY N/A 2019    Performed by Jagruti Sweeney MD at [facility] ENDO (2ND FLR)    EGD (ESOPHAGOGASTRODUODENOSCOPY) N/A 2019    Performed by Jag[facility]weeney MD at [facility] ENDO (2ND FLR)    HYSTEROSCOPY, WITH DILATION AND CURETTAGE OF UTERUS N/A 10/16/2018    [facility] Glenn Black Jr., [facility] at [facility] OR    RETINAL LASER PROCEDURE Left     SINUS SURGERY      UPPER GASTROINTESTINAL ENDOSCOPY           Family History:   Family History   Problem Relation Age of Onset    Hypertension Mother     Diabetes Mother     Colon cancer Mother 50    Colon polyps Mother     Heart disease Father     COPD Father     Heart attack Father     Hypertension Father     Ulcers Father     Cancer Maternal Grandmother     Breast cancer Maternal Grandmother     Colon cancer Maternal Grandmother     Colon polyps Maternal Grandmother     No Known Problems Paternal Grandmother     No Known Problems Sister     No Known Problems Brother     No Known Problems Maternal Aunt     No Known Problems Maternal Uncle     No Known Problems Paternal Aunt     No Known Problems Paternal Uncle     No Known Problems Maternal Grandfather     No Known Problems Paternal Grandfather     Celiac disease Neg Hx     Cirrhosis Neg Hx     Crohn's disease Neg Hx     Cystic fibrosis Neg Hx     Esophageal cancer Neg Hx     Hemochromatosis Neg Hx     Inflammatory bowel disease Neg Hx     Irritable bowel syndrome Neg Hx     Liver cancer Neg Hx     Liver disease Neg Hx     Rectal cancer Neg Hx     Stomach cancer Neg Hx     Ulcerative colitis Neg Hx     Jnonie's disease Neg Hx     Tuberculosis Neg Hx     Lymphoma Neg Hx     Scleroderma Neg Hx     Rheum arthritis Neg Hx     Melanoma Neg Hx     Multiple sclerosis Neg Hx     Psoriasis Neg Hx     Lupus Neg Hx     Skin cancer Neg Hx     Amblyopia Neg Hx     Blindness Neg Hx     Cataracts Neg Hx     Glaucoma Neg Hx     Macular degeneration Neg Hx     Retinal detachment Neg Hx     Strabismus Neg Hx     Stroke Neg Hx     Thyroid disease Neg Hx         Social History:   Social History     Socioeconomic History    Marital status:      Spouse name: Not on file    Number of children: Not on file    Years of education: Not on file    Highest education level: Not on file   Occupational History    Not on file   Social Needs    Financial resource strain: Not on file     Food insecurity:     Worry: Not on file     Inability: Not on file    Transportation needs:     Medical: Not on file     Non-medical: Not on file   Tobacco Use    Smoking status: Current Every Day Smoker     Packs/day: 1.00     Years: 15.00     Pack years: 15.00     Types: Cigarettes    Smokeless tobacco: Never Used    Tobacco comment: 1 pack every other day   Substance and Sexual Activity    Alcohol use: No     Alcohol/week: 0.0 oz    Drug use: No    Sexual activity: Yes     Partners: Male     Birth control/protection: None   Lifestyle    Physical activity:     Days per week: Not on file     Minutes per session: Not on file    Stress: Not on file   Relationships    Social connections:     Talks on phone: Not on file     Gets together: Not on file     Attends Voodoo service: Not on file     Active member of club or organization: Not on file     Attends meetings of clubs or organizations: Not on file     Relationship status: Not on file   Other Topics Concern    Not on file   Social History Narrative    Not on file        Review of patient's allergies indicates:  No Known Allergies    Current Outpatient Medications   Medication Sig Dispense Refill    acetaZOLAMIDE (DIAMOX) 500 mg CpSR Take 1 capsule (500 mg total) by mouth 2 (two) times daily. 360 capsule 1    albuterol (PROVENTIL/VENTOLIN HFA) 90 mcg/actuation inhaler INHALE 2 PUFFS BY MOUTH EVERY 6 HOURS AS NEEDED FOR WHEEZING 54 g 0    albuterol-ipratropium (DUO-NEB) 2.5 mg-0.5 mg/3 mL nebulizer solution Take 3 mLs by nebulization every 6 (six) hours as needed for Wheezing or Shortness of Breath. Rescue 100 mL 3    blood sugar diagnostic Strp 1 each by Misc.(Non-Drug; Combo Route) route 4 (four) times daily before meals and nightly. ACCU CHEK ELVIA PLUS METER 200 each 3    blood-glucose meter (ACCU-CHEK ELVIA PLUS METER) Misc TEST FOUR TIMES DAILY BEFORE MEALS AND EVERY NIGHT 90 each 1    budesonide-formoterol 160-4.5 mcg (SYMBICORT) 160-4.5  mcg/actuation HFAA Inhale 2 puffs into the lungs every 12 (twelve) hours. Controller 1 Inhaler 5    capsaicin 0.1 % Crea Apply 1 application topically 2 (two) times daily. 56.6 g 1    diclofenac sodium (VOLTAREN) 1 % Gel Apply 2 g topically once daily. 100 g 3    fluticasone propionate (FLONASE) 50 mcg/actuation nasal spray 1 spray (50 mcg total) by Each Nare route once daily. 15.8 mL 2    hydroCHLOROthiazide (HYDRODIURIL) 25 MG tablet Take 1 tablet (25 mg total) by mouth once daily. 30 tablet 11    hydrOXYzine (ATARAX) 50 MG tablet Take 1-4 at night for itching. 120 tablet 3    lancets (ACCU-CHEK SOFTCLIX LANCETS) Misc 1 Device by Misc.(Non-Drug; Combo Route) route 4 (four) times daily. 200 each 3    levocetirizine (XYZAL) 5 MG tablet Take 1 tablet (5 mg total) by mouth every evening. 30 tablet 11    linaclotide (LINZESS) 290 mcg Cap Take 1 capsule (290 mcg total) by mouth once daily. 90 capsule 3    losartan (COZAAR) 100 MG tablet Take 1 tablet (100 mg total) by mouth once daily. 90 tablet 0    medroxyPROGESTERone (PROVERA) 10 MG tablet Take 1 tablet (10 mg total) by mouth once daily. TAKE DAY 1-12 Q MONTH 12 tablet 12    meloxicam (MOBIC) 15 MG tablet TAKE 1 TABLET(15 MG) BY MOUTH DAILY AS NEEDED FOR HEADACHE 90 tablet 1    metFORMIN (GLUCOPHAGE-XR) 500 MG 24 hr tablet Take 2 tablets (1,000 mg total) by mouth 2 (two) times daily with meals. 360 tablet 0    montelukast (SINGULAIR) 10 mg tablet TAKE 1 TABLET(10 MG) BY MOUTH EVERY EVENING 30 tablet 0    nicotine polacrilex 2 MG Lozg Take 1 lozenge (2 mg total) by mouth as needed. 168 lozenge 0    oxyCODONE-acetaminophen (PERCOCET)  mg per tablet TAKE 1 TABLET BY MOUTH EVERY 8 HOURS AS NEEDED. MAY CAUSE DROWSINESS  0    pantoprazole (PROTONIX) 40 MG tablet   3    QUEtiapine (SEROQUEL) 25 MG Tab TAKE 1 TABLET(25 MG) BY MOUTH EVERY EVENING 90 tablet 0    semaglutide (OZEMPIC) 0.25 mg or 0.5 mg(2 mg/1.5 mL) PnIrma Inject 0.5 mg into the skin every  "7 days. 1.5 mL 3    sumatriptan (IMITREX) 50 MG tablet Take 1 tab at onset of headaches.  If no improvement in 2 hours, take another.  Do not take more than 2 tabs in 24 hours. 9 tablet 5    tiotropium (SPIRIVA) 18 mcg inhalation capsule Inhale 1 capsule (18 mcg total) into the lungs once daily. Controller 30 capsule 11    tiZANidine (ZANAFLEX) 4 MG tablet Take 4 mg by mouth every 8 (eight) hours.       topiramate (TOPAMAX) 100 MG tablet TAKE 3 TABLETS BY MOUTH TWICE DAILY 180 tablet 11    venlafaxine (EFFEXOR-XR) 75 MG 24 hr capsule TAKE 1 CAPSULE(75 MG) BY MOUTH EVERY EVENING 90 capsule 3    GAVILYTE-G 236-22.74-6.74 -5.86 gram suspension   0    meloxicam (MOBIC) 7.5 MG tablet TAKE 1 TABLET(7.5 MG) BY MOUTH EVERY DAY 90 tablet 0    metroNIDAZOLE (FLAGYL) 500 MG tablet Take 1 tablet (500 mg total) by mouth 4 (four) times daily. Home medication      mometasone 0.1% (ELOCON) 0.1 % cream       ondansetron (ZOFRAN) 8 MG tablet Take 1 tablet (8 mg total) by mouth every 8 (eight) hours as needed for Nausea. 90 tablet 5     No current facility-administered medications for this visit.         Objective Findings:  Vital Signs:  BP (!) 146/100 (BP Location: Right arm, Patient Position: Sitting)   Pulse 63   Resp 16   Ht 5' 4" (1.626 m)   Wt (!) 165.9 kg (365 lb 11.9 oz)   LMP  (LMP Unknown)   BMI 62.78 kg/m²   Body mass index is 62.78 kg/m².    Physical Exam:  General appearance: alert, cooperative, no distress  HENT: Normocephalic, atraumatic, neck symmetrical, no nasal discharge  Eyes: conjunctivae/corneas clear, PERRL, EOM's intact  Lungs: clear to auscultation bilaterally, no dullness to percussion bilaterally  Heart: regular rate and rhythm without rub; no displacement of the PMI  Abdomen: soft, non-tender; bowel sounds normoactive; no organomegaly  Extremities: extremities symmetric; no clubbing, cyanosis, or edema  Integument: Skin color, texture, turgor normal; no rashes; hair distrubution " normal  Neurologic: Alert and oriented X 3, normal strength, normal coordination and gait  Psychiatric: no pressured speech; normal affect; no evidence of impaired cognition    Labs:  Lab Results   Component Value Date    WBC 4.61 05/17/2019    HGB 12.3 05/17/2019    HCT 40.2 05/17/2019    MCV 91 05/17/2019     05/17/2019     Lab Results   Component Value Date    FERRITIN 9 (L) 05/17/2019     Lab Results   Component Value Date     03/21/2019    K 4.2 03/21/2019     03/21/2019    CO2 22 (L) 03/21/2019     (H) 03/21/2019    BUN 10 03/21/2019    CREATININE 1.2 03/21/2019    CALCIUM 9.0 03/21/2019    PROT 7.1 03/21/2019    ALBUMIN 3.1 (L) 03/21/2019    BILITOT 0.2 03/21/2019    ALKPHOS 80 03/21/2019    AST 9 (L) 03/21/2019    ALT 13 03/21/2019     Lab Results   Component Value Date    TSH 1.229 11/05/2018     Lab Results   Component Value Date    SEDRATE 55 (H) 08/08/2018     Lab Results   Component Value Date    CRP 11.4 (H) 08/08/2018     Lab Results   Component Value Date    HGBA1C 6.5 (H) 03/20/2019           Assessment and Plan:  Yanni Kaiser is a 48 y.o. female with a PMH of anemia, arthritis, back pain, plantar fascitis, asthma,  anxiety, MDD, DM2 , HTN, migraines, seizures, SHARATH referred to motility clinic for second opinion regarding the following problems:    GERD. Refractory  Some improvement with prilosec OTC 40 mg once daily in the past.  Some improvement w pantoprazole 40 mg to BID.   -Add gaviscon w alginate as previously advised   -EGD w BRAVO pff PPI and EoE bx EndoFlip as previously ordered.    Chest pain. Reports bothersome chest pain. Negative cardiac work up  Improvement with belching  -Cont PPI BID  -EGD w BRAVO pff PPI as previously ordered  -Manometry as previously ordered    Dysphagia.  EGD negative. esophagram with mild esophageal dysmotility; 13 mm barium tablet passed into the stomach after multiple sips of water and mildly delayed transit the GEJ.  -Cont PPI  BID  -EGD w EoE bx EndoFlip as previously ordered.  -Manometry as previously ordered; endoscopically placed    Nausea.  Early satiety. No vomiting. DM Gastroparesis (smartPill in 2016 with dalayed  gastric emptying).  EGD with gastric fluid and pill in stomach.   on percocet weekly  Unable to tolerate reglan due to twitching.   Some improvement with phenergan IV  -Cont zofran 8 mg TID prn   -start FDgard prn as previously adivsed  -Cont  gastroparesis diet. Has seen GI dietitian        DM type 2. Diagnosed 10 year ago. BS running 90s. Last hba1c 6.5  On metformin 1000 mg BID.   Previously followed by endocrinologist, currently followed by PCP.     Abdominal pain.  EGD negative. TTG/IgA negative.   Previous CT with possible diverticulitis, txed w abx - LLQ pain resolved  Still w some epigastric and LUQ pain   On seroquel, effexor  On percocet  On melaxoicam  -start FDgard prn as previously adivsed    Gas and bloating.   Avoids artificial sugars.  No improvement with lactose elimination  No longer on probiotic.  Previously treated with Augmentin tid x 10days to treat possible diverticulitis.  -Check sibo breath test    Constipation.  Normal colon transit.  Better.  No improvement with miralax once daily  No improvement with  Amitiza ?dose? BID  Insurance does not cover trulance.  -Cont linzess 290 mcg daily  -Cont miralax 4 times daily prn   -Will consider MRI defecography  -Will consider ARM    Possible prolapse. Pt denies at this time   -Will consider MRI Defecogram     Weight fluctuates. Pre albumin low. PT to speak with  bariatric MD about recommendations on high protein shakes TID. Can see GI dietitian for help with this if needed.    H. Pylori in 2016. Unsure if treated. EGD negative.  Recent stools + s/p doxy/flagyl/pepto/ppix 14 days   -Check stools for h pylori off PPI as previously ordered    Anemia. Low hgb. Labs with persistent, mild KASH. EGD/colon negative  -Cont Iron BID    CT w possible diverticulitis  vs epiploic appendagitis. Treated with Augmentin tid x 10days.  Colon negative.  LUQ pain resolved after treatment.     Fatty liver.     Family history of colon cancer. Mother dx in 50s, MGM ?dx ?. Colonoscopy with 3 mm cecal TA and hyperplastic polyps.   Repeat in 5 years (2024).    Anxiety. Depression.   Some improvement with seroquel 25 mg HS, also takes effexor 75 mg HS (for HAs).   Has seen psych in the past. Followed by PCP.    Insomnia. Not on meds. Diagnosed with SHARATH. Not on cpap/does not like it.   -Disucussed the importance of sleep in functional GI disorders.  Will consider seeing a sleep specialist and CBT for sleep  -Fu w sleep; referral placed    Migraines. Seizures.   Some improvement with tompiramate 300 mg  BID, meloxicam 15 mg daily, imitrex 50 mg PRN, also taking effexor 75 mg HS, and aimovig once monthly Per neruology.    Plantar fascitis.   Stilltaking percocet 5-325 mg once weekly since 2/2018. Was on a different narcotic for one month prior to this.  -Again discussed the role of narcotic pain medication in motility and functional gastrointestinal disorders.  Will attempt to limit narcotic use.   -Followed by pain management and podiatry    Joint pain. Joint swelling. Knees. Hands. Back. ESR/CRP high  Seen by rheumatology w/ negative work up.    Follow up for Motility w Dr. Sweeney as previously scheduled..    1. Insomnia, unspecified type    2. SHARATH (obstructive sleep apnea)    3. Nausea and vomiting, intractability of vomiting not specified, unspecified vomiting type    4. Gastroesophageal reflux disease, esophagitis presence not specified    5. Bloating    6. Constipation, unspecified constipation type    7. Esophageal dysphagia    8. Other chest pain    9. Upper abdominal pain          Order summary:  Orders Placed This Encounter    Ambulatory referral to Sleep Disorders    ondansetron (ZOFRAN) 8 MG tablet         Thank you so much for allowing me to participate in the care of Yanni  Efren Pinto, APRN, FNP-C

## 2019-07-29 NOTE — PATIENT INSTRUCTIONS
Continue pantoprazole/protonix 40 mg twice daily for reflux. Add over the counter gaviscon with alginate 1-4 times daily as needed for reflux. You can order it online at amazon.com.    Move forward with your EGD, pH testing, and esophageal manometry as previously ordered.    Start over the counter FDgard as needed for abdominal pain, fullness, nausea. Continue zofran 8 mg three times daily as needed as well.    Continue the gastroparesis diet.     I have ordered a breath test for intestinal bacterial overgrowth.    Speak to your bariatric MD about high protein shakes that you can take (three times daily) since your protein level was low. We can also have you see your dietitian for help with this.    Start over the counter iron 325 mg twice daily since your iron level was low.    Turn in your stool sample for h. Pylori bacterial infection as previously advised.     I have referred you to a sleep specialist because treating insomnia will help improve your GI/gastro symptoms. Discuss alternatives to the CPAP with them to treat the sleep apnea.     Attempt to limit narcotic pain medication use because narcotic pain medications can negatively impact your GI tract and make your symptoms worse.

## 2019-07-30 ENCOUNTER — TELEPHONE (OUTPATIENT)
Dept: GASTROENTEROLOGY | Facility: CLINIC | Age: 49
End: 2019-07-30

## 2019-07-30 ENCOUNTER — OFFICE VISIT (OUTPATIENT)
Dept: PODIATRY | Facility: CLINIC | Age: 49
End: 2019-07-30
Payer: MEDICARE

## 2019-07-30 VITALS — BODY MASS INDEX: 50.02 KG/M2 | WEIGHT: 293 LBS | HEIGHT: 64 IN

## 2019-07-30 DIAGNOSIS — S86.311A PERONEAL TENDON TEAR, RIGHT, INITIAL ENCOUNTER: ICD-10-CM

## 2019-07-30 DIAGNOSIS — M67.88 LEFT PERONEAL TENDINOSIS: ICD-10-CM

## 2019-07-30 DIAGNOSIS — M72.2 PLANTAR FASCIITIS: ICD-10-CM

## 2019-07-30 DIAGNOSIS — S86.311A PERONEAL TENDON TEAR, RIGHT, INITIAL ENCOUNTER: Primary | ICD-10-CM

## 2019-07-30 PROCEDURE — 99214 OFFICE O/P EST MOD 30 MIN: CPT | Mod: HCNC,S$GLB,,

## 2019-07-30 PROCEDURE — 99499 RISK ADDL DX/OHS AUDIT: ICD-10-PCS | Mod: HCNC,S$GLB,,

## 2019-07-30 PROCEDURE — 99214 PR OFFICE/OUTPT VISIT, EST, LEVL IV, 30-39 MIN: ICD-10-PCS | Mod: HCNC,S$GLB,,

## 2019-07-30 PROCEDURE — 99999 PR PBB SHADOW E&M-EST. PATIENT-LVL IV: ICD-10-PCS | Mod: PBBFAC,HCNC,,

## 2019-07-30 PROCEDURE — 3008F BODY MASS INDEX DOCD: CPT | Mod: HCNC,CPTII,S$GLB,

## 2019-07-30 PROCEDURE — 99999 PR PBB SHADOW E&M-EST. PATIENT-LVL IV: CPT | Mod: PBBFAC,HCNC,,

## 2019-07-30 PROCEDURE — 3008F PR BODY MASS INDEX (BMI) DOCUMENTED: ICD-10-PCS | Mod: HCNC,CPTII,S$GLB,

## 2019-07-30 PROCEDURE — 99499 UNLISTED E&M SERVICE: CPT | Mod: HCNC,S$GLB,,

## 2019-07-30 RX ORDER — MELOXICAM 7.5 MG/1
TABLET ORAL
Qty: 90 TABLET | Refills: 0 | Status: SHIPPED | OUTPATIENT
Start: 2019-07-30 | End: 2019-11-22

## 2019-07-30 RX ORDER — MELOXICAM 7.5 MG/1
7.5 TABLET ORAL DAILY
Qty: 30 TABLET | Refills: 0 | Status: SHIPPED | OUTPATIENT
Start: 2019-07-30 | End: 2019-07-30 | Stop reason: SDUPTHER

## 2019-07-30 NOTE — PROGRESS NOTES
Subjective:      Patient ID: Yanni Kaiser is a 49 y.o. female.    Chief Complaint: Follow-up (Foot pain, Bilateral)    Patient returns to the clinic reporting continued b/l foot pain, had an injection for left plantar fasciitis which dod not fully resolve his problem and has persistent peroneal tendon pain, mri was negative for tears in these tendons.  Had a left hindfoot MRI showing Achilles tendinopathy and PF however achilles not bothering him.  He is also reporting lateral right foot pain as well as plantar fascial pain.  Been using the orthotics, icing, stretching and resting but not improving.    7/3/19 Yanni is a 49 y.o. female who presents to the clinic for second opinion regarding heel pain right foot, being treated by Dr. Barnes for plantar fasciitis, tried orthotics, topical and oral pain meds, night splint, stretching.  MRI did show plantar fasciitis.  She is also diabetic with neuropathy.  . Yanni has a past medical history of Allergy, Anemia, Asthma, Cigarette smoker, Depression, Diabetes with neurologic complications, GERD (gastroesophageal reflux disease), High cholesterol, Hyperprolactinemia, Hypertension, Obese body habitus, Pseudotumor cerebri, Seizures, Simple endometrial hyperplasia (2018), Sleep apnea, and Smoker. This patient has documented high risk feet requiring routine maintenance secondary to peripheral neuropathy.    PCP: Carlyle Gordillo MD        Current shoe gear:  Affected Foot: Tennis shoes     Unaffected Foot: Tennis shoes    Hemoglobin A1C   Date Value Ref Range Status   03/20/2019 6.5 (H) 4.0 - 5.6 % Final     Comment:     ADA Screening Guidelines:  5.7-6.4%  Consistent with prediabetes  >or=6.5%  Consistent with diabetes  High levels of fetal hemoglobin interfere with the HbA1C  assay. Heterozygous hemoglobin variants (HbS, HgC, etc)do  not significantly interfere with this assay.   However, presence of multiple variants may affect accuracy.     07/30/2018 5.8 (H)  4.0 - 5.6 % Final     Comment:     ADA Screening Guidelines:  5.7-6.4%  Consistent with prediabetes  >or=6.5%  Consistent with diabetes  High levels of fetal hemoglobin interfere with the HbA1C  assay. Heterozygous hemoglobin variants (HbS, HgC, etc)do  not significantly interfere with this assay.   However, presence of multiple variants may affect accuracy.     2018 5.8 (H) 4.0 - 5.6 % Final     Comment:     According to ADA guidelines, hemoglobin A1c <7.0% represents  optimal control in non-pregnant diabetic patients. Different  metrics may apply to specific patient populations.   Standards of Medical Care in Diabetes-2016.  For the purpose of screening for the presence of diabetes:  <5.7%     Consistent with the absence of diabetes  5.7-6.4%  Consistent with increasing risk for diabetes   (prediabetes)  >or=6.5%  Consistent with diabetes  Currently, no consensus exists for use of hemoglobin A1c  for diagnosis of diabetes for children.  This Hemoglobin A1c assay has significant interference with fetal   hemoglobin   (HbF). The results are invalid for patients with abnormal amounts of   HbF,   including those with known Hereditary Persistence   of Fetal Hemoglobin. Heterozygous hemoglobin variants (HbAS, HbAC,   HbAD, HbAE, HbA2) do not significantly interfere with this assay;   however, presence of multiple variants in a sample may impact the %   interference.         Past Medical History:   Diagnosis Date    Allergy     Anemia     Asthma     Cigarette smoker     Depression     Diabetes with neurologic complications     GERD (gastroesophageal reflux disease)     High cholesterol     Hyperprolactinemia     Hypertension     Obese body habitus     Pseudotumor cerebri     Seizures     Simple endometrial hyperplasia 2018    Sleep apnea     Smoker        Past Surgical History:   Procedure Laterality Date    BREAST CYST ASPIRATION       SECTION      X 1    CHOLECYSTECTOMY       COLONOSCOPY      COLONOSCOPY N/A 1/14/2019    Performed by Jagruti Sweeney MD at [facility] (2ND FLR)    EGD (ESOPHAGOGASTRODUODENOSCOPY) N/A 1/14/2019    Performed by Jag[facility]weeney MD at [facility] (2ND FLR)    HYSTEROSCOPY, WITH DILATION AND CURETTAGE OF UTERUS N/A 10/16/2018    [facility] Glenn [facility] Jr[facility] at [facility]    RETINAL LASER PROCEDURE Left     SINUS SURGERY      UPPER GASTROINTESTINAL ENDOSCOPY         Family History   Problem Relation Age of Onset    Hypertension Mother     Diabetes Mother     Colon cancer Mother 50    Colon polyps Mother     Heart disease Father     COPD Father     Heart attack Father     Hypertension Father     Ulcers Father     Cancer Maternal Grandmother     Breast cancer Maternal Grandmother     Colon cancer Maternal Grandmother     Colon polyps Maternal Grandmother     No Known Problems Paternal Grandmother     No Known Problems Sister     No Known Problems Brother     No Known Problems Maternal Aunt     No Known Problems Maternal Uncle     No Known Problems Paternal Aunt     No Known Problems Paternal Uncle     No Known Problems Maternal Grandfather     No Known Problems Paternal Grandfather     Celiac disease Neg Hx     Cirrhosis Neg Hx     Crohn's disease Neg Hx     Cystic fibrosis Neg Hx     Esophageal cancer Neg Hx     Hemochromatosis Neg Hx     Inflammatory bowel disease Neg Hx     Irritable bowel syndrome Neg Hx     Liver cancer Neg Hx     Liver disease Neg Hx     Rectal cancer Neg Hx     Stomach cancer Neg Hx     Ulcerative colitis Neg Hx     Jonnie's disease Neg Hx     Tuberculosis Neg Hx     Lymphoma Neg Hx     Scleroderma Neg Hx     Rheum arthritis Neg Hx     Melanoma Neg Hx     Multiple sclerosis Neg Hx     Psoriasis Neg Hx     Lupus Neg Hx     Skin cancer Neg Hx     Amblyopia Neg Hx     Blindness Neg Hx     Cataracts Neg Hx     Glaucoma Neg Hx     Macular degeneration Neg Hx     Retinal detachment Neg  Hx     Strabismus Neg Hx     Stroke Neg Hx     Thyroid disease Neg Hx        Social History     Socioeconomic History    Marital status:      Spouse name: Not on file    Number of children: Not on file    Years of education: Not on file    Highest education level: Not on file   Occupational History    Not on file   Social Needs    Financial resource strain: Not on file    Food insecurity:     Worry: Not on file     Inability: Not on file    Transportation needs:     Medical: Not on file     Non-medical: Not on file   Tobacco Use    Smoking status: Current Every Day Smoker     Packs/day: 1.00     Years: 15.00     Pack years: 15.00     Types: Cigarettes    Smokeless tobacco: Never Used    Tobacco comment: 1 pack every other day   Substance and Sexual Activity    Alcohol use: No     Alcohol/week: 0.0 oz    Drug use: No    Sexual activity: Yes     Partners: Male     Birth control/protection: None   Lifestyle    Physical activity:     Days per week: Not on file     Minutes per session: Not on file    Stress: Not on file   Relationships    Social connections:     Talks on phone: Not on file     Gets together: Not on file     Attends [employer] service: Not on file     Active member of club or organization: Not on file     Attends meetings of clubs or organizations: Not on file     Relationship status: Not on file   Other Topics Concern    Not on file   Social History Narrative    Not on file       Current Outpatient Medications   Medication Sig Dispense Refill    acetaZOLAMIDE (DIAMOX) 500 mg CpSR Take 1 capsule (500 mg total) by mouth 2 (two) times daily. 360 capsule 1    albuterol (PROVENTIL/VENTOLIN HFA) 90 mcg/actuation inhaler INHALE 2 PUFFS BY MOUTH EVERY 6 HOURS AS NEEDED FOR WHEEZING 54 g 0    albuterol-ipratropium (DUO-NEB) 2.5 mg-0.5 mg/3 mL nebulizer solution Take 3 mLs by nebulization every 6 (six) hours as needed for Wheezing or Shortness of Breath. Rescue 100 mL 3    blood  sugar diagnostic Strp 1 each by Misc.(Non-Drug; Combo Route) route 4 (four) times daily before meals and nightly. ACCU CHEK ELVIA PLUS METER 200 each 3    blood-glucose meter (ACCU-CHEK ELVIA PLUS METER) [facility] [place] TEST FOUR TIMES DAILY BEFORE MEALS AND EVERY NIGHT 90 each 1    budesonide-formoterol 160-4.5 mcg (SYMBICORT) 160-4.5 mcg/actuation HFAA Inhale 2 puffs into the lungs every 12 (twelve) hours. Controller 1 Inhaler 5    capsaicin 0.1 % Crea Apply 1 application topically 2 (two) times daily. 56.6 g 1    diclofenac sodium (VOLTAREN) 1 % Gel Apply 2 g topically once daily. 100 g 3    fluticasone propionate (FLONASE) 50 mcg/actuation nasal spray 1 spray (50 mcg total) by Each Nare route once daily. 15.8 mL 2    GAVILYTE-G 236-22.74-6.74 -5.86 gram suspension   0    hydrOXYzine (ATARAX) 50 MG tablet Take 1-4 at night for itching. 120 tablet 3    lancets (ACCU-CHEK SOFTCLIX LANCETS) Misc 1 Device by Misc.(Non-Drug; Combo Route) route 4 (four) times daily. 200 each 3    levocetirizine (XYZAL) 5 MG tablet Take 1 tablet (5 mg total) by mouth every evening. 30 tablet 11    linaclotide (LINZESS) 290 mcg Cap Take 1 capsule (290 mcg total) by mouth once daily. 90 capsule 3    losartan (COZAAR) 100 MG tablet Take 1 tablet (100 mg total) by mouth once daily. 90 tablet 0    medroxyPROGESTERone (PROVERA) 10 MG tablet Take 1 tablet (10 mg total) by mouth once daily. TAKE DAY 1-12 Q MONTH 12 tablet 12    meloxicam (MOBIC) 15 MG tablet TAKE 1 TABLET(15 MG) BY MOUTH DAILY AS NEEDED FOR HEADACHE 90 tablet 1    metFORMIN (GLUCOPHAGE-XR) 500 MG 24 hr tablet Take 2 tablets (1,000 mg total) by mouth 2 (two) times daily with meals. 360 tablet 0    metroNIDAZOLE (FLAGYL) 500 MG tablet Take 1 tablet (500 mg total) by mouth 4 (four) times daily. Home medication      mometasone 0.1% (ELOCON) 0.1 % cream       montelukast (SINGULAIR) 10 mg tablet TAKE 1 TABLET(10 MG) BY MOUTH EVERY EVENING 30 tablet 0    nicotine polacrilex 2  MG Lozg Take 1 lozenge (2 mg total) by mouth as needed. 168 lozenge 0    ondansetron (ZOFRAN) 8 MG tablet Take 1 tablet (8 mg total) by mouth every 8 (eight) hours as needed for Nausea. 90 tablet 5    oxyCODONE-acetaminophen (PERCOCET)  mg per tablet TAKE 1 TABLET BY MOUTH EVERY 8 HOURS AS NEEDED. MAY CAUSE DROWSINESS  0    pantoprazole (PROTONIX) 40 MG tablet   3    QUEtiapine (SEROQUEL) 25 MG Tab TAKE 1 TABLET(25 MG) BY MOUTH EVERY EVENING 90 tablet 0    semaglutide (OZEMPIC) 0.25 mg or 0.5 mg(2 mg/1.5 mL) PnIj Inject 0.5 mg into the skin every 7 days. 1.5 mL 3    sumatriptan (IMITREX) 50 MG tablet Take 1 tab at onset of headaches.  If no improvement in 2 hours, take another.  Do not take more than 2 tabs in 24 hours. 9 tablet 5    tiotropium (SPIRIVA) 18 mcg inhalation capsule Inhale 1 capsule (18 mcg total) into the lungs once daily. Controller 30 capsule 11    tiZANidine (ZANAFLEX) 4 MG tablet Take 4 mg by mouth every 8 (eight) hours.       topiramate (TOPAMAX) 100 MG tablet TAKE 3 TABLETS BY MOUTH TWICE DAILY 180 tablet 11    venlafaxine (EFFEXOR-XR) 75 MG 24 hr capsule TAKE 1 CAPSULE(75 MG) BY MOUTH EVERY EVENING 90 capsule 3    hydroCHLOROthiazide (HYDRODIURIL) 25 MG tablet Take 1 tablet (25 mg total) by mouth once daily. 30 tablet 11    meloxicam (MOBIC) 7.5 MG tablet TAKE 1 TABLET(7.5 MG) BY MOUTH EVERY DAY 90 tablet 0     No current facility-administered medications for this visit.        Review of patient's allergies indicates:  No Known Allergies      ROS  ROS:  Constitution: Negative for chills, fever, weakness and malaise/fatigue.   HEENT: Negative for headaches.   Cardiovascular: Negative for chest pain and claudication.   Respiratory: Negative for cough and shortness of breath.   Musculoskeletal: (+) for foot pain.  Negative for muscle cramps and muscle weakness.   Gastrointestinal: Negative for nausea and vomiting.   Neurological:(+) for numbness, tingling and paresthesias.    Dermatological:  - for skin rash, (--) for keratosis, (--) for fungal toenails, (--) for wound.          Objective:      Physical Exam  Constitutional:  Patient is oriented to person, place, and time. Vital signs are normal.  Appears well-developed and well-nourished.     Vascular:  Dorsalis pedis pulses are 1/4 on the right side, and 1/4 on the left side.   Posterior tibial pulses are 1/4 on the right side, and 1/4 on the left side.   (--) digital hair growth, capillary fill time to all toes <3 seconds, toes are cool touch, trace pedal swelling    Skin/Dermatological:  Skin is warm and intact.  No cyanosis or clubbing.  No rashes noted.  No open wounds. Nails trim without thickening.  (--) keratosis     Musculoskeletal:      Pes planus, left > right plantar medial calcaneal tubercle pain.   Pedal rom within normal limits.  (+) ankle joint DF restriction with both knee flexed and extened.  B/l PB/PL swelling and pain with inversion and eversion, tenderness distal to malleolus to PB insertion, no plantar pain except medial tubercles b/l  No achilles tenderness but thickened    Neurological:  (+) deficits to sharp/dull, light touch or vibratory sensation bilateral feet, ten points tested.   Muscle strength to tibialis anterior, extensor hallucis longus, extensor digitorum longus, peroneal muscles, flexor hallucis/digotorum longus, posterior tibial and gastrosoleal complex is 5/5, normal tone without assymmetry   Patellar reflexes are 2+ on the right side and 2+ on the left side.  Achilles reflexes are 2+ on the right side and 2+ on the left side.         EXAMINATION:  MRI FOOT (HINDFOOT) LEFT WITHOUT CONTRAST    CLINICAL HISTORY:  eval for plantar fasciitis;  Type 2 diabetes mellitus with other diabetic neurological complication    TECHNIQUE:  MRI left ankle performed per routine protocol without contrast.    COMPARISON:  Left foot radiograph 05/28/2017    FINDINGS:  Ligaments: Anterior and posterior inferior  tibiofibular ligaments are intact.  Anterior talofibular, calcaneofibular and posterior talofibular ligaments are intact.  Deep and superficial components of deltoid ligament are intact.  Spring ligament complex and Lisfranc ligament are intact.    Tendons: Medial ankle flexor, dorsal ankle extensor, and peroneus tendons are intact.  The Achilles tendon appears thickened with increased signal which may reflect tendinosis or a partial tear.    Joints: Mild effusions at the tibiotalar and posterior subtalar joints.  Tibiotalar and subtalar cartilage maintained.    Bones:  No fracture or infiltrative process.  Mild degenerative change at the TMT joint with joint space narrowing and subchondral cystic change.    Miscellaneous: Sinus tarsi and tarsal tunnel are unremarkable.  There is thickening and increased signal of the proximal plantar fascia with mild edema at its calcaneal insertion.  There is nonspecific circumferential subcutaneous edema about the distal left lower extremity and ankle.      Impression       Plantar fasciitis.    Achilles tendinosis versus partial tear.    Mild 5th tarsometatarsal degenerative change.    Nonspecific left lower extremity and ankle subcutaneous edema.    Electronically signed by resident: Mariam Butts  Date: 06/12/2019  Time: 16:32    Electronically signed by: Chrystal Vo MD  Date: 06/12/2019  Time: 16:47           Assessment:       Encounter Diagnoses   Name Primary?    Peroneal tendon tear, right, initial encounter Yes    Plantar fasciitis     Left peroneal tendinosis          Plan:       Yanni was seen today for follow-up.    Diagnoses and all orders for this visit:    Peroneal tendon tear, right, initial encounter  -     MRI Foot (Hindfoot) Right Without Contrast; Future  -     Discontinue: meloxicam (MOBIC) 7.5 MG tablet; Take 1 tablet (7.5 mg total) by mouth once daily.    Plantar fasciitis  -     MRI Foot (Hindfoot) Right Without Contrast; Future  -      Discontinue: meloxicam (MOBIC) 7.5 MG tablet; Take 1 tablet (7.5 mg total) by mouth once daily.  -     Case Request Operating Room: FASCIOTOMY, PLANTAR, ENDOSCOPIC, REPAIR, TENDON, LOWER EXTREMITY  PB/PL tendon repair secondary with PRP injection    Left peroneal tendinosis  -     Case Request Operating Room: FASCIOTOMY, PLANTAR, ENDOSCOPIC, REPAIR, TENDON, LOWER EXTREMITY  PB/PL tendon repair secondary with PRP injection      I counseled the patient on her conditions, their implications and medical management.    Shoe inspection. Diabetic Foot Education. Patient reminded of the importance of good nutrition and blood sugar control to help prevent podiatric complications of diabetes. Patient instructed on proper foot hygeine. We discussed wearing proper shoe gear, daily foot inspections, never walking without protective shoe gear, never putting sharp instruments to feet.  We also discussed padding and shoes with high toe boxes for foot deformities.    Reviewed  Left MRI again and pertinent findings.  Discussed clinical findings.    Decision for surgery was made for left foot. Ordered right hindfoot to assess PF and possible tendon tear.  We discussed the following procedure PB/PL tendon secondary repair with needling (97140) and PRP injection (67857) and an endoscopic plantar fasciotomy [address] [facility].  6 weeks HWB in boot.  The risks, benefits, complications, treatment options, expected outcomes, post-operative care including rupture of tendon, hematoma, complex regional pain syndrome, wound dehiscence, deep veinous thrombosis, pulmonary embolus, infection, need for revision surgery, permanent numbness, loss of limb and restrictions were discussed with the patient who verbalized understanding.  No guarantees implied or given.  The patient will proceed with the planned procedure.      Plantar fasciitis left foot: We discussed the etiology of the problem and the patient was given literature regarding plantar  fasciitis and stretching.  With the patient's permission, an injection of 12 mg of kenalog, 4 mg of dexamethasone phosphate and 1 cc of lidocaine 1% and 1 cc of marcaine 0.5% into the left medial plantar heel.  Patient tolerated well. We also discussed proper shoe gear.   Spenco orthotic supports were .  We discussed the possibility of another injection or physical therapy or surgery should this not resolve the problem.  Return to clinic 2 weeks.        Will Salas DPM

## 2019-08-01 ENCOUNTER — TELEPHONE (OUTPATIENT)
Dept: GASTROENTEROLOGY | Facility: CLINIC | Age: 49
End: 2019-08-01

## 2019-08-01 NOTE — TELEPHONE ENCOUNTER
MOTILITY CLINIC PROCEDURE ORDERS    CLEARANCE FOR PROCEDURES:  Not needed       PROCEDURES  EGD w BRAVO w endo flip  esophageal manometry, placed endoscopically    FLOOR:    2nd Floor    Reason for 2nd Floor:   Gastroparesis      BMI>50      PREP  Full liquid diet 5 days       MEDICATIONS   PH testing off PPI  Motility Studies (esophageal manometry/anorectal manometry)  Hold Narcotics x 1 days   Hold TCA x 1 days  Propofol only. No fentanyl or benzodiazepine during sedation. If additional sedation needed, discuss with Dr. Sweeney.    ORDER OF TESTING:  PT local. Can separate tests.

## 2019-08-05 RX ORDER — LIDOCAINE HYDROCHLORIDE 10 MG/ML
1 INJECTION, SOLUTION EPIDURAL; INFILTRATION; INTRACAUDAL; PERINEURAL ONCE
Status: CANCELLED | OUTPATIENT
Start: 2019-08-05 | End: 2019-08-05

## 2019-08-08 ENCOUNTER — TELEPHONE (OUTPATIENT)
Dept: ENDOSCOPY | Facility: HOSPITAL | Age: 49
End: 2019-08-08

## 2019-08-08 DIAGNOSIS — K21.9 GASTROESOPHAGEAL REFLUX DISEASE, ESOPHAGITIS PRESENCE NOT SPECIFIED: ICD-10-CM

## 2019-08-08 DIAGNOSIS — R14.2 BELCHING: ICD-10-CM

## 2019-08-08 DIAGNOSIS — R13.19 ESOPHAGEAL DYSPHAGIA: Primary | ICD-10-CM

## 2019-08-08 NOTE — TELEPHONE ENCOUNTER
Patient is scheduled for EGD with Bravo/endoflip on 11/5/19 with Dr. Sweeney.    Patient offered sooner date, but she requested to procedures to be scheduled in November due to upcoming MRI and foot surgery scheduled in October.    Patient refused to have Esophageal Manometry procedure scheduled, she stated she doesn't want to have procedure done.

## 2019-08-09 DIAGNOSIS — R13.19 ESOPHAGEAL DYSPHAGIA: Primary | ICD-10-CM

## 2019-08-09 NOTE — TELEPHONE ENCOUNTER
Ciara Pinto NP  You; Bria Quezada MA 11 minutes ago (3:17 PM)      Okay. I will place order and addend procedure orders to reflect esophageal manometry with endoscopic placement.     Thanks,   ARPITA Kwon    Routing comment

## 2019-08-09 NOTE — TELEPHONE ENCOUNTER
Pt states she is def not for someone putting anything down her nose while she is awake. Pt agrees w the latter.

## 2019-08-15 NOTE — TELEPHONE ENCOUNTER
MOTILITY CLINIC PROCEDURE ORDERS    CLEARANCE FOR PROCEDURES:  Not needed       PROCEDURES  EGD with EndoFlip +/- Botox  Esophageal manometry with impedance - dysphagia   Esophageal manometry with impedance - belching   Endoscopic placement of manometry probe  pH Impedance 24 hours off PPI    FLOOR:  4th Floor -manometry  2nd Floor-egd    Reason for 2nd Floor:   Gastroparesis   BMI>50  PREP  Full liquid diet 5 days       MEDICATIONS   pH Studies  OFF PPI/H2 Blocker     Motility Studies (esophageal manometry/anorectal manometry)  Hold Narcotics x 1 days   Hold TCA x 1 days  Propofol only. No fentanyl or benzodiazepine during sedation. If additional sedation needed, discuss with Dr. Sweeney.    ORDER OF TESTING:  Day 1: EGD w endoscopic placement of manometry catheter. PH impedence  Day 2:return ph impedence

## 2019-08-15 NOTE — TELEPHONE ENCOUNTER
Yes please.    Thank you,  Ainsley      Message   Received: Today   Message Contents   ARPITA Carpenter, RN   Caller: Unspecified (1 week ago,  9:07 AM)             Yes, lets change bravo to pH impedence off PPI. Do I need to redo the orders and telephone note?     Thanks,   Cher, NP

## 2019-08-15 NOTE — TELEPHONE ENCOUNTER
Ciara,    I received the message to schedule the manometry probe to be placed endoscopically, but the patient is also scheduled for EGD with Bravo placement.  The manometry probe and the Bravo capsule are unable to be placed at same time.  Would you like to change the Bravo order to 24 hr ph impedance?  Please advise.    Thank you,  Ainsley

## 2019-08-16 NOTE — TELEPHONE ENCOUNTER
Will do.    Thanks,  Ainsley Pinto NP  You 11 minutes ago (1:47 PM)      Proceed with egd and manometry. We will figure out pH testing later.     Thanks,   ARPITA Kwon    Routing comment

## 2019-08-19 ENCOUNTER — OFFICE VISIT (OUTPATIENT)
Dept: FAMILY MEDICINE | Facility: CLINIC | Age: 49
End: 2019-08-19
Payer: MEDICARE

## 2019-08-19 VITALS
SYSTOLIC BLOOD PRESSURE: 138 MMHG | BODY MASS INDEX: 50.02 KG/M2 | HEART RATE: 70 BPM | OXYGEN SATURATION: 97 % | WEIGHT: 293 LBS | RESPIRATION RATE: 16 BRPM | TEMPERATURE: 99 F | HEIGHT: 64 IN | DIASTOLIC BLOOD PRESSURE: 86 MMHG

## 2019-08-19 DIAGNOSIS — E11.22 TYPE 2 DIABETES MELLITUS WITH STAGE 3 CHRONIC KIDNEY DISEASE, WITH LONG-TERM CURRENT USE OF INSULIN: ICD-10-CM

## 2019-08-19 DIAGNOSIS — T14.91XA SUICIDE ATTEMPT: ICD-10-CM

## 2019-08-19 DIAGNOSIS — G93.2 IDIOPATHIC INTRACRANIAL HYPERTENSION: Chronic | ICD-10-CM

## 2019-08-19 DIAGNOSIS — M48.061 SPINAL STENOSIS OF LUMBAR REGION, UNSPECIFIED WHETHER NEUROGENIC CLAUDICATION PRESENT: ICD-10-CM

## 2019-08-19 DIAGNOSIS — G40.909 NONINTRACTABLE EPILEPSY WITHOUT STATUS EPILEPTICUS, UNSPECIFIED EPILEPSY TYPE: Chronic | ICD-10-CM

## 2019-08-19 DIAGNOSIS — F11.20 UNCOMPLICATED OPIOID DEPENDENCE: ICD-10-CM

## 2019-08-19 DIAGNOSIS — Z98.890 S/P D&C (STATUS POST DILATION AND CURETTAGE): ICD-10-CM

## 2019-08-19 DIAGNOSIS — E78.2 MIXED HYPERLIPIDEMIA: Chronic | ICD-10-CM

## 2019-08-19 DIAGNOSIS — R26.89 ANTALGIC GAIT: ICD-10-CM

## 2019-08-19 DIAGNOSIS — H52.7 REFRACTIVE ERROR: ICD-10-CM

## 2019-08-19 DIAGNOSIS — E44.1 MILD PROTEIN MALNUTRITION: Chronic | ICD-10-CM

## 2019-08-19 DIAGNOSIS — Z00.00 ENCOUNTER FOR PREVENTIVE HEALTH EXAMINATION: Primary | ICD-10-CM

## 2019-08-19 DIAGNOSIS — N18.30 TYPE 2 DIABETES MELLITUS WITH STAGE 3 CHRONIC KIDNEY DISEASE, WITH LONG-TERM CURRENT USE OF INSULIN: ICD-10-CM

## 2019-08-19 DIAGNOSIS — D63.8 ANEMIA OF CHRONIC DISEASE: Chronic | ICD-10-CM

## 2019-08-19 DIAGNOSIS — K21.9 GASTROESOPHAGEAL REFLUX DISEASE WITHOUT ESOPHAGITIS: Chronic | ICD-10-CM

## 2019-08-19 DIAGNOSIS — E66.01 MORBID OBESITY WITH BMI OF 60.0-69.9, ADULT: Chronic | ICD-10-CM

## 2019-08-19 DIAGNOSIS — I83.90 VARICOSE VEINS OF LOWER EXTREMITY, UNSPECIFIED LATERALITY, UNSPECIFIED WHETHER COMPLICATED: ICD-10-CM

## 2019-08-19 DIAGNOSIS — Z79.4 TYPE 2 DIABETES MELLITUS WITH STAGE 3 CHRONIC KIDNEY DISEASE, WITH LONG-TERM CURRENT USE OF INSULIN: ICD-10-CM

## 2019-08-19 DIAGNOSIS — I10 ESSENTIAL HYPERTENSION: Chronic | ICD-10-CM

## 2019-08-19 DIAGNOSIS — N85.01 SIMPLE ENDOMETRIAL HYPERPLASIA: ICD-10-CM

## 2019-08-19 DIAGNOSIS — Z23 NEED FOR TETANUS BOOSTER: ICD-10-CM

## 2019-08-19 DIAGNOSIS — G43.009 MIGRAINE WITHOUT AURA AND WITHOUT STATUS MIGRAINOSUS, NOT INTRACTABLE: Chronic | ICD-10-CM

## 2019-08-19 DIAGNOSIS — F33.42 RECURRENT MAJOR DEPRESSIVE DISORDER, IN FULL REMISSION: ICD-10-CM

## 2019-08-19 DIAGNOSIS — M72.2 PLANTAR FASCIITIS, BILATERAL: ICD-10-CM

## 2019-08-19 DIAGNOSIS — G47.33 OSA (OBSTRUCTIVE SLEEP APNEA): ICD-10-CM

## 2019-08-19 DIAGNOSIS — F17.210 CIGARETTE NICOTINE DEPENDENCE WITHOUT COMPLICATION: ICD-10-CM

## 2019-08-19 PROBLEM — N93.8 DUB (DYSFUNCTIONAL UTERINE BLEEDING): Status: RESOLVED | Noted: 2018-10-16 | Resolved: 2019-08-19

## 2019-08-19 PROCEDURE — 3044F PR MOST RECENT HEMOGLOBIN A1C LEVEL <7.0%: ICD-10-PCS | Mod: HCNC,CPTII,S$GLB, | Performed by: NURSE PRACTITIONER

## 2019-08-19 PROCEDURE — G0439 PPPS, SUBSEQ VISIT: HCPCS | Mod: HCNC,S$GLB,, | Performed by: NURSE PRACTITIONER

## 2019-08-19 PROCEDURE — 3075F SYST BP GE 130 - 139MM HG: CPT | Mod: HCNC,CPTII,S$GLB, | Performed by: NURSE PRACTITIONER

## 2019-08-19 PROCEDURE — 90714 TD VACCINE GREATER THAN OR EQUAL TO 7YO PRESERVATIVE FREE IM: ICD-10-PCS | Mod: HCNC,S$GLB,, | Performed by: NURSE PRACTITIONER

## 2019-08-19 PROCEDURE — 99999 PR PBB SHADOW E&M-EST. PATIENT-LVL IV: ICD-10-PCS | Mod: PBBFAC,HCNC,, | Performed by: NURSE PRACTITIONER

## 2019-08-19 PROCEDURE — 3075F PR MOST RECENT SYSTOLIC BLOOD PRESS GE 130-139MM HG: ICD-10-PCS | Mod: HCNC,CPTII,S$GLB, | Performed by: NURSE PRACTITIONER

## 2019-08-19 PROCEDURE — 3079F PR MOST RECENT DIASTOLIC BLOOD PRESSURE 80-89 MM HG: ICD-10-PCS | Mod: HCNC,CPTII,S$GLB, | Performed by: NURSE PRACTITIONER

## 2019-08-19 PROCEDURE — G0439 PR MEDICARE ANNUAL WELLNESS SUBSEQUENT VISIT: ICD-10-PCS | Mod: HCNC,S$GLB,, | Performed by: NURSE PRACTITIONER

## 2019-08-19 PROCEDURE — 99999 PR PBB SHADOW E&M-EST. PATIENT-LVL IV: CPT | Mod: PBBFAC,HCNC,, | Performed by: NURSE PRACTITIONER

## 2019-08-19 PROCEDURE — 90471 TD VACCINE GREATER THAN OR EQUAL TO 7YO PRESERVATIVE FREE IM: ICD-10-PCS | Mod: HCNC,S$GLB,, | Performed by: NURSE PRACTITIONER

## 2019-08-19 PROCEDURE — 3044F HG A1C LEVEL LT 7.0%: CPT | Mod: HCNC,CPTII,S$GLB, | Performed by: NURSE PRACTITIONER

## 2019-08-19 PROCEDURE — 90714 TD VACC NO PRESV 7 YRS+ IM: CPT | Mod: HCNC,S$GLB,, | Performed by: NURSE PRACTITIONER

## 2019-08-19 PROCEDURE — 90471 IMMUNIZATION ADMIN: CPT | Mod: HCNC,S$GLB,, | Performed by: NURSE PRACTITIONER

## 2019-08-19 PROCEDURE — 3079F DIAST BP 80-89 MM HG: CPT | Mod: HCNC,CPTII,S$GLB, | Performed by: NURSE PRACTITIONER

## 2019-08-19 RX ORDER — ATORVASTATIN CALCIUM 40 MG/1
40 TABLET, FILM COATED ORAL DAILY
Qty: 90 TABLET | Refills: 3 | Status: SHIPPED | OUTPATIENT
Start: 2019-08-19 | End: 2021-03-05 | Stop reason: SDUPTHER

## 2019-08-19 NOTE — PROGRESS NOTES
"Yanni Kaiser presented for a  Medicare AWV and comprehensive Health Risk Assessment today. The following components were reviewed and updated:    · Medical history  · Family History  · Social history  · Allergies and Current Medications  · Health Risk Assessment  · Health Maintenance  · Care Team     ** See Completed Assessments for Annual Wellness Visit within the encounter summary.**       The following assessments were completed:  · Living Situation  · CAGE  · Depression Screening  · Timed Get Up and Go  · Whisper Test  · Cognitive Function Screening  · Nutrition Screening  · ADL Screening  · PAQ Screening      Vitals:    08/19/19 1018 08/19/19 1105   BP:  138/86   BP Location:  Left arm   Patient Position:  Sitting   BP Method:  Large (Manual)   Pulse: 70    Resp: 16    Temp: 98.9 °F (37.2 °C)    TempSrc: Oral    SpO2: 97%    Weight: (!) 164.6 kg (362 lb 14 oz)    Height: 5' 4" (1.626 m)      Body mass index is 62.29 kg/m².      Protective Sensation (w/ 10 gram monofilament):  Right: Decreased  Left: Decreased    Visual Inspection:  Normal -  Bilateral    Pedal Pulses:   Right: Present  Left: Present    Posterior tibialis:   Right:Present  Left: Present      Diagnoses and health risks identified today and associated recommendations/orders:    1. Encounter for preventive health examination  Provided Yanni with a 5-10 year written screening schedule and personal prevention plan. Recommendations were developed using the USPSTF age appropriate recommendations. Education, counseling, and referrals were provided as needed. After Visit Summary printed and given to patient which includes a list of additional screenings\tests needed.    2. Mixed hyperlipidemia  - atorvastatin (LIPITOR) 40 MG tablet; Take 1 tablet (40 mg total) by mouth once daily.  Dispense: 90 tablet; Refill: 3  - See ASCVD risk score in problem list    3. Essential hypertension  - Hypertension Digital Medicine (HDMP) Enrollment Order  - " Hypertension Digital Medicine (HDMP): Assign Onboarding Questionnaires  - Discussed getting supplies and set up with O-  - BP controlled presently - reviewed anti-hypertensive regimen - continue current therapy    4. Need for tetanus booster  - (In Office Administered) Td Vaccine - Preservative Free  - Counseled regarding Td vaccination - administered    5. Migraine without aura and without status migrainosus, not intractable  - Stable. Continue present management.     6. Idiopathic intracranial hypertension  - Stable. Continue present management.     7. Nonintractable epilepsy without status epilepticus, unspecified epilepsy type  - Stable. Continue present management.     8. Spinal stenosis of lumbar region, unspecified whether neurogenic claudication present  - Stable. Continue present management.     9. Recurrent major depressive disorder, in full remission  - Stable. Continue present management.     10. Suicide attempt  - Noted. Years ago. Has been seen by psychiatry in the past.     11. Uncomplicated opioid dependence  - Stable. Continue present management.     12. Refractive error  - Stable. Followed by Dr. EVONNE Bosch.    13. Varicose veins of lower extremity, unspecified laterality, unspecified whether complicated  - Stable. Continue present management.     14. S/P D&C (status post dilation and curettage)  - Stable.     15. Simple endometrial hyperplasia  - Stable. Continue present management.     16. Anemia of chronic disease  - Stable. Continue present management.     17. Morbid obesity with BMI of 60.0-69.9, adult  - Stable. Continue present management.   - Counseled to exercise 150 minutes per week  - We discussed dietary interventions for the management of weight and reduction of risk for chronic metabolic disease    18. Mild protein malnutrition  - Stable. Continue present management.     19. Type 2 diabetes mellitus with stage 3 chronic kidney disease, with long-term current use of insulin  -  Stable. Continue present management.     20. Gastroesophageal reflux disease without esophagitis  - Stable. Continue present management.     21. Plantar fasciitis, bilateral  - Stable. Scheduled for plantar fascitis surgery 10/9/2019    22. SHARATH (obstructive sleep apnea)  - Stable. Continue present management.     23. Cigarette nicotine dependence without complication  - The patient was counseled on the dangers of tobacco use, and was Counseled for 3-10 minutes.. The patient was advised to quit and reluctant to quit Reviewed strategies to maximize success, including Chantix, Wellbutrin, Patches, Nicotine gum and Nicotine Lozenges.     24. Antalgic gait  - Stable. Scheduled for plantar fasciitis surgery 10/9/2019    I offered to discuss end of life issues, including information on how to make advance directives that the patient could use to name someone who would make medical decisions on their behalf if they became too ill to make themselves.    ___Patient declined  _X_Patient is interested, I provided paper work and offered to discuss.      Follow up in about 1 year (around 8/19/2020) for Annual Wellness Visit.    Eleanor Grimm, DNP

## 2019-08-19 NOTE — PATIENT INSTRUCTIONS
Counseling and Referral of Other Preventative  (Italic type indicates deductible and co-insurance are waived)    Patient Name: Yanni Kaiser  Today's Date: 8/19/2019    Health Maintenance       Date Due Completion Date    Low Dose Statin 08/04/1993 ---    Foot Exam 03/07/2019 3/7/2018 (Done)    Override on 3/7/2018: Done    Influenza Vaccine (1) 08/01/2019 9/10/2018    TETANUS VACCINE 12/18/2019 (Originally 8/4/1990) ---    Hemoglobin A1c 09/20/2019 3/20/2019    Override on 5/1/2015: Done    Lipid Panel 01/30/2020 1/30/2019    Eye Exam 03/26/2020 3/26/2019    Override on 3/26/2019: Done    Mammogram 07/16/2020 7/16/2019    Pap Smear with HPV Cotest 07/11/2021 7/11/2018    Override on 8/23/2016: Done        Orders Placed This Encounter   Procedures    (In Office Administered) Td Vaccine - Preservative Free    Hypertension Digital Medicine (HDMP) Enrollment Order     The following information is provided to all patients.  This information is to help you find resources for any of the problems found today that may be affecting your health:                Living healthy guide: www.[facility].[place].gov      Understanding Diabetes: www.diabetes.org      Eating healthy: www.cdc.gov/healthyweight      CDC home safety checklist: www.cdc.gov/steadi/patient.html      Agency on Aging: www.goea.[place].[facility]      Alcoholics anonymous (AA): www.aa.org      Physical Activity: www.janis.nih.gov/kf2rgvj      Tobacco use: www.quitwithusla.org

## 2019-08-22 ENCOUNTER — HOSPITAL ENCOUNTER (OUTPATIENT)
Dept: RADIOLOGY | Facility: HOSPITAL | Age: 49
Discharge: HOME | End: 2019-08-22
Payer: MEDICARE

## 2019-08-22 DIAGNOSIS — S86.311A PERONEAL TENDON TEAR, RIGHT, INITIAL ENCOUNTER: ICD-10-CM

## 2019-08-22 DIAGNOSIS — M72.2 PLANTAR FASCIITIS: ICD-10-CM

## 2019-08-22 PROCEDURE — 73718 MRI LOWER EXTREMITY W/O DYE: CPT | Mod: TC,HCNC,RT

## 2019-08-22 PROCEDURE — 73718 MRI LOWER EXTREMITY W/O DYE: CPT | Mod: 26,HCNC,RT, | Performed by: RADIOLOGY

## 2019-08-22 PROCEDURE — 73718 MRI FOOT (HINDFOOT) RIGHT WITHOUT CONTRAST: ICD-10-PCS | Mod: 26,HCNC,RT, | Performed by: RADIOLOGY

## 2019-08-29 ENCOUNTER — NURSE TRIAGE (OUTPATIENT)
Dept: ADMINISTRATIVE | Facility: CLINIC | Age: 49
End: 2019-08-29

## 2019-08-29 NOTE — TELEPHONE ENCOUNTER
Reason for Disposition   Systolic BP >= 160 OR Diastolic >= 100, and any cardiac or neurologic symptoms (e.g., chest pain, difficulty breathing, unsteady gait, blurred vision)    Additional Information   Negative: Sounds like a life-threatening emergency to the triager   Negative: Pregnant > 20 weeks and new hand or face swelling   Negative: Pregnant > 20 weeks and BP > 140/90    Protocols used: HIGH BLOOD PRESSURE-A-OH

## 2019-08-29 NOTE — TELEPHONE ENCOUNTER
Called patient several times to assess symptoms, no answer, LVM instructing patient to go to the ED for worsening symptoms.

## 2019-09-03 DIAGNOSIS — F33.3 SEVERE EPISODE OF RECURRENT MAJOR DEPRESSIVE DISORDER, WITH PSYCHOTIC FEATURES: ICD-10-CM

## 2019-09-03 RX ORDER — QUETIAPINE FUMARATE 25 MG/1
TABLET, FILM COATED ORAL
Qty: 90 TABLET | Refills: 0 | Status: SHIPPED | OUTPATIENT
Start: 2019-09-03 | End: 2019-11-28 | Stop reason: SDUPTHER

## 2019-09-06 ENCOUNTER — PATIENT MESSAGE (OUTPATIENT)
Dept: FAMILY MEDICINE | Facility: CLINIC | Age: 49
End: 2019-09-06

## 2019-09-06 ENCOUNTER — OFFICE VISIT (OUTPATIENT)
Dept: FAMILY MEDICINE | Facility: CLINIC | Age: 49
End: 2019-09-06
Payer: MEDICARE

## 2019-09-06 VITALS
RESPIRATION RATE: 16 BRPM | SYSTOLIC BLOOD PRESSURE: 140 MMHG | TEMPERATURE: 98 F | HEART RATE: 75 BPM | OXYGEN SATURATION: 98 % | DIASTOLIC BLOOD PRESSURE: 88 MMHG

## 2019-09-06 DIAGNOSIS — M79.672 ACUTE FOOT PAIN, LEFT: Primary | ICD-10-CM

## 2019-09-06 DIAGNOSIS — M72.2 PLANTAR FASCIITIS, BILATERAL: ICD-10-CM

## 2019-09-06 PROCEDURE — 99214 PR OFFICE/OUTPT VISIT, EST, LEVL IV, 30-39 MIN: ICD-10-PCS | Mod: HCNC,S$GLB,, | Performed by: NURSE PRACTITIONER

## 2019-09-06 PROCEDURE — 99214 OFFICE O/P EST MOD 30 MIN: CPT | Mod: HCNC,S$GLB,, | Performed by: NURSE PRACTITIONER

## 2019-09-06 PROCEDURE — 99999 PR PBB SHADOW E&M-EST. PATIENT-LVL III: CPT | Mod: PBBFAC,HCNC,, | Performed by: NURSE PRACTITIONER

## 2019-09-06 PROCEDURE — 3079F PR MOST RECENT DIASTOLIC BLOOD PRESSURE 80-89 MM HG: ICD-10-PCS | Mod: HCNC,CPTII,S$GLB, | Performed by: NURSE PRACTITIONER

## 2019-09-06 PROCEDURE — 3079F DIAST BP 80-89 MM HG: CPT | Mod: HCNC,CPTII,S$GLB, | Performed by: NURSE PRACTITIONER

## 2019-09-06 PROCEDURE — 99999 PR PBB SHADOW E&M-EST. PATIENT-LVL III: ICD-10-PCS | Mod: PBBFAC,HCNC,, | Performed by: NURSE PRACTITIONER

## 2019-09-06 PROCEDURE — 3077F PR MOST RECENT SYSTOLIC BLOOD PRESSURE >= 140 MM HG: ICD-10-PCS | Mod: HCNC,CPTII,S$GLB, | Performed by: NURSE PRACTITIONER

## 2019-09-06 PROCEDURE — 3077F SYST BP >= 140 MM HG: CPT | Mod: HCNC,CPTII,S$GLB, | Performed by: NURSE PRACTITIONER

## 2019-09-06 NOTE — PROGRESS NOTES
"Routine Office Visit    Patient Name: Yanni Kaiser    : 1970  MRN: [id number]    Chief Complaint:  Ft pain    Subjective:  Yanni is a 49 y.o. female who presents today for:    1.  Ft pain - patient reports today for acute left foot pain. She states that she was working out yesterday and stepped off of weights and states my foot went pop."  States that since the injury she has had constant pain to the foot that is bearable at rest but is mostly present and worsens to 10/10 in severity when walking.  Pain is aching in nature and does not radiate.  Denies any fevers or chills.  Denies hitting her head or any other parts of her body when she stepped down from the weights at her gym.  For the symptoms she has tried leftover Percocet as well as an over-the-counter cream but she has not been taking Mobic which was given to her previously.  She saw a podiatrist for her foot problems he recommended surgery but she states that she was not able to get the surgery done because her podiatrist passed away.  She has an appointment with another podiatrist in early October for her foot issues per her report.    Past Medical History  Past Medical History:   Diagnosis Date    Allergy     Anemia     Asthma     Back pain     Chronic bronchitis     Cigarette smoker     Depression     Diabetes with neurologic complications     DUB (dysfunctional uterine bleeding) 10/16/2018    GERD (gastroesophageal reflux disease)     High cholesterol     Hyperprolactinemia     Hypertension     Obese body habitus     Pseudotumor cerebri     Renal manifestation of secondary diabetes mellitus     Respiratory failure     Seizures     Simple endometrial hyperplasia 2018    Sleep apnea     Smoker     Tobacco dependence        Past Surgical History  Past Surgical History:   Procedure Laterality Date    BREAST CYST ASPIRATION       SECTION      X 1    CHOLECYSTECTOMY      COLONOSCOPY      COLONOSCOPY N/A " 1/14/2019    Per[facility] by [facility] (2ND F[facility])    EGD (ESOPHAGOGASTRODUODENOSCOPY) N/A 1/14/2019    [facility] at [facility] (2ND FLR)    HYSTEROSCOPY, WITH DILATION AND CURETTAGE OF UTERUS N/A 10/16/2018    [facility] Glenn [facility] Jr., [facility] at [facility] OR    RETINAL LASER PROCEDURE Left     SINUS SURGERY      UPPER GASTROINTESTINAL ENDOSCOPY         Family History  Family History   Problem Relation Age of Onset    Hypertension Mother     Diabetes Mother     Colon cancer Mother 50    Colon polyps Mother     Heart disease Father     COPD Father     Heart attack Father     Hypertension Father     Ulcers Father     Cancer Maternal Grandmother     Breast cancer Maternal Grandmother     Colon cancer Maternal Grandmother     Colon polyps Maternal Grandmother     No Known Problems Paternal Grandmother     No Known Problems Brother     No Known Problems Maternal Aunt     No Known Problems Maternal Uncle     No Known Problems Paternal Aunt     No Known Problems Paternal Uncle     No Known Problems Maternal Grandfather     No Known Problems Paternal Grandfather     Celiac disease Neg Hx     Cirrhosis Neg Hx     Crohn's disease Neg Hx     Cystic fibrosis Neg Hx     Esophageal cancer Neg Hx     Hemochromatosis Neg Hx     Inflammatory bowel disease Neg Hx     Irritable bowel syndrome Neg Hx     Liver cancer Neg Hx     Liver disease Neg Hx     Rectal cancer Neg Hx     Stomach cancer Neg Hx     Ulcerative colitis Neg Hx     Jonnie's disease Neg Hx     Tuberculosis Neg Hx     Lymphoma Neg Hx     Scleroderma Neg Hx     Rheum arthritis Neg Hx     Melanoma Neg Hx     Multiple sclerosis Neg Hx     Psoriasis Neg Hx     Lupus Neg Hx     Skin cancer Neg Hx     Amblyopia Neg Hx     Blindness Neg Hx     Cataracts Neg Hx     Glaucoma Neg Hx     Macular degeneration Neg Hx     Retinal detachment Neg Hx     Strabismus Neg Hx     Stroke Neg Hx      Thyroid disease Neg Hx        Social History  Social History     Socioeconomic History    Marital status:      Spouse name: Not on file    Number of children: Not on file    Years of education: Not on file    Highest education level: Not on file   Occupational History    Not on file   Social Needs    Financial resource strain: Not on file    Food insecurity:     Worry: Not on file     Inability: Not on file    Transportation needs:     Medical: Not on file     Non-medical: Not on file   Tobacco Use    Smoking status: Current Every Day Smoker     Packs/day: 1.00     Years: 27.00     Pack years: 27.00     Types: Cigarettes     Start date: 1/1/1992    Smokeless tobacco: Never Used   Substance and Sexual Activity    Alcohol use: No     Alcohol/week: 0.0 oz    Drug use: No    Sexual activity: Yes     Partners: Male     Birth control/protection: None   Lifestyle    Physical activity:     Days per week: Not on file     Minutes per session: Not on file    Stress: Not on file   Relationships    Social connections:     Talks on phone: Not on file     Gets together: Not on file     Attends [employer]ical service: Not on file     Active member of club or organization: Not on file     Attends meetings of clubs or organizations: Not on file     Relationship status: Not on file   Other Topics Concern    Not on file   Social History Narrative    Not on file       Current Medications  Current Outpatient Medications on File Prior to Visit   Medication Sig Dispense Refill    acetaZOLAMIDE (DIAMOX) 500 mg CpSR Take 1 capsule (500 mg total) by mouth 2 (two) times daily. 360 capsule 1    albuterol (PROVENTIL/VENTOLIN HFA) 90 mcg/actuation inhaler INHALE 2 PUFFS BY MOUTH EVERY 6 HOURS AS NEEDED FOR WHEEZING 54 g 0    albuterol-ipratropium (DUO-NEB) 2.5 mg-0.5 mg/3 mL nebulizer solution Take 3 mLs by nebulization every 6 (six) hours as needed for Wheezing or Shortness of Breath. Rescue 100 mL 3    atorvastatin  (LIPITOR) 40 MG tablet Take 1 tablet (40 mg total) by mouth once daily. 90 tablet 3    blood sugar diagnostic Strp 1 each by Misc.(Non-Drug; Combo Route) route 4 (four) times daily before meals and nightly. ACCU CHEK ELVIA PLUS METER 200 each 3    blood-glucose meter (ACCU-CHEK ELVIA PLUS METER) [facility] [place] TEST FOUR TIMES DAILY BEFORE MEALS AND EVERY NIGHT 90 each 1    budesonide-formoterol 160-4.5 mcg (SYMBICORT) 160-4.5 mcg/actuation HFAA Inhale 2 puffs into the lungs every 12 (twelve) hours. Controller 1 Inhaler 5    diclofenac sodium (VOLTAREN) 1 % Gel Apply 2 g topically once daily. 100 g 3    fluticasone propionate (FLONASE) 50 mcg/actuation nasal spray 1 spray (50 mcg total) by Each Nare route once daily. 15.8 mL 2    hydrOXYzine (ATARAX) 50 MG tablet Take 1-4 at night for itching. 120 tablet 3    lancets (ACCU-CHEK SOFTCLIX LANCETS) Misc 1 Device by Misc.(Non-Drug; Combo Route) route 4 (four) times daily. 200 each 3    levocetirizine (XYZAL) 5 MG tablet Take 1 tablet (5 mg total) by mouth every evening. 30 tablet 11    linaclotide (LINZESS) 290 mcg Cap Take 1 capsule (290 mcg total) by mouth once daily. 90 capsule 3    losartan (COZAAR) 100 MG tablet Take 1 tablet (100 mg total) by mouth once daily. 90 tablet 0    meloxicam (MOBIC) 15 MG tablet TAKE 1 TABLET(15 MG) BY MOUTH DAILY AS NEEDED FOR HEADACHE 90 tablet 1    meloxicam (MOBIC) 7.5 MG tablet TAKE 1 TABLET(7.5 MG) BY MOUTH EVERY DAY 90 tablet 0    metFORMIN (GLUCOPHAGE-XR) 500 MG 24 hr tablet Take 2 tablets (1,000 mg total) by mouth 2 (two) times daily with meals. 360 tablet 0    montelukast (SINGULAIR) 10 mg tablet TAKE 1 TABLET(10 MG) BY MOUTH EVERY EVENING 30 tablet 0    ondansetron (ZOFRAN) 8 MG tablet Take 1 tablet (8 mg total) by mouth every 8 (eight) hours as needed for Nausea. 90 tablet 5    oxyCODONE-acetaminophen (PERCOCET)  mg per tablet TAKE 1 TABLET BY MOUTH EVERY 8 HOURS AS NEEDED. MAY CAUSE DROWSINESS  0    pantoprazole  (PROTONIX) 40 MG tablet   3    QUEtiapine (SEROQUEL) 25 MG Tab TAKE 1 TABLET(25 MG) BY MOUTH EVERY EVENING 90 tablet 0    semaglutide (OZEMPIC) 0.25 mg or 0.5 mg(2 mg/1.5 mL) PnIj Inject 0.5 mg into the skin every 7 days. 1.5 mL 3    sumatriptan (IMITREX) 50 MG tablet Take 1 tab at onset of headaches.  If no improvement in 2 hours, take another.  Do not take more than 2 tabs in 24 hours. 9 tablet 5    tiotropium (SPIRIVA) 18 mcg inhalation capsule Inhale 1 capsule (18 mcg total) into the lungs once daily. Controller 30 capsule 11    topiramate (TOPAMAX) 100 MG tablet TAKE 3 TABLETS BY MOUTH TWICE DAILY 180 tablet 11    venlafaxine (EFFEXOR-XR) 75 MG 24 hr capsule TAKE 1 CAPSULE(75 MG) BY MOUTH EVERY EVENING 90 capsule 3    hydroCHLOROthiazide (HYDRODIURIL) 25 MG tablet Take 1 tablet (25 mg total) by mouth once daily. 30 tablet 11     No current facility-administered medications on file prior to visit.        Allergies   Review of patient's allergies indicates:  No Known Allergies    Review of Systems (Pertinent positives)  Review of Systems   Constitutional: Negative for chills, diaphoresis, fever, malaise/fatigue and weight loss.   HENT: Negative.    Eyes: Negative.    Respiratory: Negative.    Cardiovascular: Negative.    Gastrointestinal: Negative.    Genitourinary: Negative.    Musculoskeletal: Positive for joint pain and myalgias. Negative for back pain, falls and neck pain.   Skin: Negative.    Neurological: Negative.    Endo/Heme/Allergies: Negative.    Psychiatric/Behavioral: Negative.        BP (!) 140/88 (BP Location: Left arm, Patient Position: Sitting)   Pulse 75   Temp 98.1 °F (36.7 °C)   LMP 08/17/2019   SpO2 98%     Physical Exam   Constitutional: She is oriented to person, place, and time. She appears well-developed and well-nourished. No distress.   Eyes: Pupils are equal, round, and reactive to light. Conjunctivae and EOM are normal.   Neck: Normal range of motion. Neck supple.    Cardiovascular: Normal rate, regular rhythm, normal heart sounds and intact distal pulses. Exam reveals no gallop and no friction rub.   No murmur heard.  Pulses:       Popliteal pulses are 2+ on the right side, and 2+ on the left side.        Dorsalis pedis pulses are 2+ on the right side, and 2+ on the left side.   Pulmonary/Chest: Effort normal and breath sounds normal. No stridor. No respiratory distress. She has no wheezes. She has no rales. She exhibits no tenderness.   Abdominal: Soft. Bowel sounds are normal.   Musculoskeletal: Normal range of motion.        Right ankle: Normal.        Left ankle: Normal.        Right foot: Normal.        Left foot: There is tenderness. There is normal range of motion, no bony tenderness, no swelling, normal capillary refill, no crepitus, no deformity and no laceration.        Feet:    Left foot - normal strength and range of motion and normal strength of range of motion to left ankle; no visible bruising no bony tenderness; 2+ pulses in left foot no skin breakdown   Neurological: She is alert and oriented to person, place, and time.   Skin: Skin is warm and dry. Capillary refill takes less than 2 seconds. She is not diaphoretic.   Vitals reviewed.       Assessment/Plan:  Yanni Kaiser is a 49 y.o. female who presents today for :    Yanni was seen today for foot pain.    Diagnoses and all orders for this visit:    Acute foot pain, left  -     X-Ray Foot Complete Left; Future    Plantar fasciitis, bilateral     X-ray of the foot showed no fractures or dislocations.  The ankle and foot have normal strength and range of motion and and are neurovascularly intact.  Recommended RICE as well as the Mobic she was given previously. Patient was educated regarding the side effects of NSAIDs, including GI ulceration, kidney dysfunction, GI bleeding, and stomach upset.  Patient was instructed to stop the medication and call the clinic if the patient experiences any stomach  upset, black tarry stools, bloody stools, or vomiting while taking this medication.  She is wearing flats today so I recommended her to wear a more supportive shoe which offered some compression.  Can use ice or heat for the symptoms as well.  If the symptoms last 2-3 weeks despite conservative treatment patient should contact her podiatrist's office to seek a sooner follow-up appointment.  Patient verbalized understanding of instructions.        This office note has been dictated.  This dictation has been generated using M-Modal Fluency Direct dictation; some phonetic errors may occur.   My collaborating physician is Dr. Jose Rausch.

## 2019-09-12 ENCOUNTER — OFFICE VISIT (OUTPATIENT)
Dept: PODIATRY | Facility: CLINIC | Age: 49
End: 2019-09-12
Payer: MEDICARE

## 2019-09-12 ENCOUNTER — HOSPITAL ENCOUNTER (OUTPATIENT)
Dept: RADIOLOGY | Facility: HOSPITAL | Age: 49
Discharge: HOME | End: 2019-09-12
Attending: PODIATRIST
Payer: MEDICARE

## 2019-09-12 VITALS — HEIGHT: 64 IN | WEIGHT: 293 LBS | BODY MASS INDEX: 50.02 KG/M2

## 2019-09-12 DIAGNOSIS — R60.9 SWELLING: ICD-10-CM

## 2019-09-12 DIAGNOSIS — M79.672 LEFT FOOT PAIN: ICD-10-CM

## 2019-09-12 DIAGNOSIS — M79.672 LEFT FOOT PAIN: Primary | ICD-10-CM

## 2019-09-12 PROCEDURE — 73720 MRI FOOT (HINDFOOT) LEFT W W/O CONTRAST: ICD-10-PCS | Mod: 26,HCNC,LT, | Performed by: RADIOLOGY

## 2019-09-12 PROCEDURE — 3008F PR BODY MASS INDEX (BMI) DOCUMENTED: ICD-10-PCS | Mod: HCNC,CPTII,S$GLB, | Performed by: PODIATRIST

## 2019-09-12 PROCEDURE — 73720 MRI LWR EXTREMITY W/O&W/DYE: CPT | Mod: TC,HCNC,LT

## 2019-09-12 PROCEDURE — 3008F BODY MASS INDEX DOCD: CPT | Mod: HCNC,CPTII,S$GLB, | Performed by: PODIATRIST

## 2019-09-12 PROCEDURE — A9585 GADOBUTROL INJECTION: HCPCS | Mod: HCNC | Performed by: PODIATRIST

## 2019-09-12 PROCEDURE — 25500020 PHARM REV CODE 255: Mod: HCNC | Performed by: PODIATRIST

## 2019-09-12 PROCEDURE — 99999 PR PBB SHADOW E&M-EST. PATIENT-LVL III: CPT | Mod: PBBFAC,HCNC,, | Performed by: PODIATRIST

## 2019-09-12 PROCEDURE — 73720 MRI LWR EXTREMITY W/O&W/DYE: CPT | Mod: 26,HCNC,LT, | Performed by: RADIOLOGY

## 2019-09-12 PROCEDURE — 99213 OFFICE O/P EST LOW 20 MIN: CPT | Mod: HCNC,S$GLB,, | Performed by: PODIATRIST

## 2019-09-12 PROCEDURE — 99213 PR OFFICE/OUTPT VISIT, EST, LEVL III, 20-29 MIN: ICD-10-PCS | Mod: HCNC,S$GLB,, | Performed by: PODIATRIST

## 2019-09-12 PROCEDURE — 99999 PR PBB SHADOW E&M-EST. PATIENT-LVL III: ICD-10-PCS | Mod: PBBFAC,HCNC,, | Performed by: PODIATRIST

## 2019-09-12 RX ORDER — GADOBUTROL 604.72 MG/ML
10 INJECTION INTRAVENOUS
Status: COMPLETED | OUTPATIENT
Start: 2019-09-12 | End: 2019-09-12

## 2019-09-12 RX ADMIN — GADOBUTROL 10 ML: 604.72 INJECTION INTRAVENOUS at 12:09

## 2019-09-19 NOTE — PROGRESS NOTES
"Subjective:      Patient ID: Yanni Kaiser is a 49 y.o. female.    Chief Complaint: Foot Pain (Painful knot plantar of left foot) and Heel Pain (Left foot)    Pt presents c/o left heel pain. Pt states she head a "pop" noise that was followed by extreme pain a few days ago.     Review of Systems   Constitution: Negative for chills, fever and malaise/fatigue.   HENT: Negative for hearing loss.    Cardiovascular: Negative for claudication.   Respiratory: Negative for shortness of breath.    Skin: Negative for flushing and rash.   Musculoskeletal: Negative for joint pain and myalgias.   Neurological: Negative for loss of balance, numbness, paresthesias and sensory change.   Psychiatric/Behavioral: Negative for altered mental status.           Objective:      Physical Exam   Constitutional: She is oriented to person, place, and time. She appears well-developed and well-nourished.   Cardiovascular:   Pulses:       Dorsalis pedis pulses are 2+ on the right side, and 2+ on the left side.        Posterior tibial pulses are 2+ on the right side, and 2+ on the left side.   Musculoskeletal:        Right knee: She exhibits no swelling and no ecchymosis.        Left knee: She exhibits no swelling and no ecchymosis.        Right ankle: She exhibits normal range of motion, no swelling, no ecchymosis and normal pulse. No lateral malleolus, no medial malleolus and no head of 5th metatarsal tenderness found. Achilles tendon exhibits no pain, no defect and normal Copeland's test results.        Left ankle: She exhibits normal range of motion, no swelling, no ecchymosis and normal pulse. No lateral malleolus, no medial malleolus and no head of 5th metatarsal tenderness found. Achilles tendon exhibits no pain and normal Copeland's test results.        Right lower leg: She exhibits no tenderness, no bony tenderness, no swelling, no edema and no deformity.        Left lower leg: She exhibits no tenderness, no swelling and no edema. "        Right foot: There is normal range of motion and no deformity.        Left foot: There is normal range of motion and no deformity.   Pain in left posterior heel, some localized edema noted  No palpable dell noted   Feet:   Right Foot:   Protective Sensation: 5 sites tested. 5 sites sensed.   Left Foot:   Protective Sensation: 5 sites tested. 5 sites sensed.   Neurological: She is alert and oriented to person, place, and time.   Gross sensation intact to b/L lower extremities   Skin: Skin is warm. Capillary refill takes more than 3 seconds. No abrasion, no bruising, no burn and no ecchymosis noted.   No open lesions noted to b/L lower extremities.      Psychiatric: She has a normal mood and affect. Her speech is normal and behavior is normal. She is attentive.             Assessment:       Encounter Diagnoses   Name Primary?    Left foot pain Yes    Swelling          Plan:       Yanni was seen today for foot pain and heel pain.    Diagnoses and all orders for this visit:    Left foot pain  -     MRI Foot ([facility]) Left W W/O Contrast; Future    Swelling  -     MRI Foot ([facility]) Left W W/O Contrast; Future      I counseled the patient on her conditions, their implications and medical management.      MRI ordered for left foot  Pt advised on RICE and OTC NSAIDs for associated pain.     Pt dispensed CAM boot to be worn at all times while ambulating  Pt will RTC after MRI  .

## 2019-09-23 ENCOUNTER — OFFICE VISIT (OUTPATIENT)
Dept: FAMILY MEDICINE | Facility: CLINIC | Age: 49
End: 2019-09-23
Payer: MEDICARE

## 2019-09-23 VITALS
OXYGEN SATURATION: 98 % | BODY MASS INDEX: 50.02 KG/M2 | SYSTOLIC BLOOD PRESSURE: 122 MMHG | HEIGHT: 64 IN | WEIGHT: 293 LBS | TEMPERATURE: 99 F | DIASTOLIC BLOOD PRESSURE: 75 MMHG | HEART RATE: 74 BPM | RESPIRATION RATE: 17 BRPM

## 2019-09-23 DIAGNOSIS — R10.13 EPIGASTRIC PAIN: Primary | ICD-10-CM

## 2019-09-23 PROCEDURE — 3074F SYST BP LT 130 MM HG: CPT | Mod: HCNC,CPTII,S$GLB, | Performed by: FAMILY MEDICINE

## 2019-09-23 PROCEDURE — 3074F PR MOST RECENT SYSTOLIC BLOOD PRESSURE < 130 MM HG: ICD-10-PCS | Mod: HCNC,CPTII,S$GLB, | Performed by: FAMILY MEDICINE

## 2019-09-23 PROCEDURE — 3008F BODY MASS INDEX DOCD: CPT | Mod: HCNC,CPTII,S$GLB, | Performed by: FAMILY MEDICINE

## 2019-09-23 PROCEDURE — 99214 PR OFFICE/OUTPT VISIT, EST, LEVL IV, 30-39 MIN: ICD-10-PCS | Mod: HCNC,S$GLB,, | Performed by: FAMILY MEDICINE

## 2019-09-23 PROCEDURE — 3078F PR MOST RECENT DIASTOLIC BLOOD PRESSURE < 80 MM HG: ICD-10-PCS | Mod: HCNC,CPTII,S$GLB, | Performed by: FAMILY MEDICINE

## 2019-09-23 PROCEDURE — 99999 PR PBB SHADOW E&M-EST. PATIENT-LVL III: ICD-10-PCS | Mod: PBBFAC,HCNC,, | Performed by: FAMILY MEDICINE

## 2019-09-23 PROCEDURE — 99214 OFFICE O/P EST MOD 30 MIN: CPT | Mod: HCNC,S$GLB,, | Performed by: FAMILY MEDICINE

## 2019-09-23 PROCEDURE — 3078F DIAST BP <80 MM HG: CPT | Mod: HCNC,CPTII,S$GLB, | Performed by: FAMILY MEDICINE

## 2019-09-23 PROCEDURE — 3008F PR BODY MASS INDEX (BMI) DOCUMENTED: ICD-10-PCS | Mod: HCNC,CPTII,S$GLB, | Performed by: FAMILY MEDICINE

## 2019-09-23 PROCEDURE — 99999 PR PBB SHADOW E&M-EST. PATIENT-LVL III: CPT | Mod: PBBFAC,HCNC,, | Performed by: FAMILY MEDICINE

## 2019-09-23 NOTE — PROGRESS NOTES
Routine Office Visit    Patient Name: Yanni Kaiser    : 1970  MRN: [id number]    Subjective:  Yanni is a 49 y.o. female who presents today for:    1. Epigastric pain  Patient is having recurring epigastric pain since .  In , labs were ordered, but she never had them done.  She has chronic GERD and belches a lot.  She states that now she just has the burning pain in middle of her epigastric area.  She does get nauseated from time to time.  No unexplained weight loss.  She states she was placed on Ozempic in  and that along with exercise has allowed for her to lose weight.      Past Medical History  Past Medical History:   Diagnosis Date    Allergy     Anemia     Asthma     Back pain     Chronic bronchitis     Cigarette smoker     Depression     Diabetes with neurologic complications     DUB (dysfunctional uterine bleeding) 10/16/2018    GERD (gastroesophageal reflux disease)     High cholesterol     Hyperprolactinemia     Hypertension     Obese body habitus     Pseudotumor cerebri     Renal manifestation of secondary diabetes mellitus     Respiratory failure     Seizures     Simple endometrial hyperplasia 2018    Sleep apnea     Smoker     Tobacco dependence        Past Surgical History  Past Surgical History:   Procedure Laterality Date    BREAST CYST ASPIRATION       SECTION      X 1    CHOLECYSTECTOMY      COLONOSCOPY      COLONOSCOPY N/A 2019    Performed by Jagruti Sweeney MD at [facility] ENDO (2ND FLR)    EGD (ESOPHAGOGASTRODUODENOSCOPY) N/A 2019    Performed by Jagr[facility]weeney MD at [facility] ENDO (2ND FLR)    HYSTEROSCOPY, WITH DILATION AND CURETTAGE OF UTERUS N/A 10/16/2018    Performed by Glenn Black Jr., [facility] at [facility] OR    RETINAL LASER PROCEDURE Left     SINUS SURGERY      UPPER GASTROINTESTINAL ENDOSCOPY         Family History  Family History   Problem Relation Age of Onset    Hypertension Mother     Diabetes Mother     Colon  cancer Mother 50    Colon polyps Mother     Heart disease Father     COPD Father     Heart attack Father     Hypertension Father     Ulcers Father     Cancer Maternal Grandmother     Breast cancer Maternal Grandmother     Colon cancer Maternal Grandmother     Colon polyps Maternal Grandmother     No Known Problems Paternal Grandmother     No Known Problems Brother     No Known Problems Maternal Aunt     No Known Problems Maternal Uncle     No Known Problems Paternal Aunt     No Known Problems Paternal Uncle     No Known Problems Maternal Grandfather     No Known Problems Paternal Grandfather     Celiac disease Neg Hx     Cirrhosis Neg Hx     Crohn's disease Neg Hx     Cystic fibrosis Neg Hx     Esophageal cancer Neg Hx     Hemochromatosis Neg Hx     Inflammatory bowel disease Neg Hx     Irritable bowel syndrome Neg Hx     Liver cancer Neg Hx     Liver disease Neg Hx     Rectal cancer Neg Hx     Stomach cancer Neg Hx     Ulcerative colitis Neg Hx     Jonnie's disease Neg Hx     Tuberculosis Neg Hx     Lymphoma Neg Hx     Scleroderma Neg Hx     Rheum arthritis Neg Hx     Melanoma Neg Hx     Multiple sclerosis Neg Hx     Psoriasis Neg Hx     Lupus Neg Hx     Skin cancer Neg Hx     Amblyopia Neg Hx     Blindness Neg Hx     Cataracts Neg Hx     Glaucoma Neg Hx     Macular degeneration Neg Hx     Retinal detachment Neg Hx     Strabismus Neg Hx     Stroke Neg Hx     Thyroid disease Neg Hx        Social History  Social History     Socioeconomic History    Marital status:      Spouse name: Not on file    Number of children: Not on file    Years of education: Not on file    Highest education level: Not on file   Occupational History    Not on file   Social Needs    Financial resource strain: Not on file    Food insecurity:     Worry: Not on file     Inability: Not on file    Transportation needs:     Medical: Not on file     Non-medical: Not on file   Tobacco  Use    Smoking status: Current Every Day Smoker     Packs/day: 1.00     Years: 27.00     Pack years: 27.00     Types: Cigarettes     Start date: 1/1/1992    Smokeless tobacco: Never Used   Substance and Sexual Activity    Alcohol use: No     Alcohol/week: 0.0 standard drinks    Drug use: No    Sexual activity: Yes     Partners: Male     Birth control/protection: None   Lifestyle    Physical activity:     Days per week: Not on file     Minutes per session: Not on file    Stress: Not on file   Relationships    Social connections:     Talks on phone: Not on file     Gets together: Not on file     Attends Oriental orthodox service: Not on file     Active member of club or organization: Not on file     Attends meetings of clubs or organizations: Not on file     Relationship status: Not on file   Other Topics Concern    Not on file   Social History Narrative    Not on file       Current Medications  Current Outpatient Medications on File Prior to Visit   Medication Sig Dispense Refill    acetaZOLAMIDE (DIAMOX) 500 mg CpSR Take 1 capsule (500 mg total) by mouth 2 (two) times daily. 360 capsule 1    albuterol (PROVENTIL/VENTOLIN HFA) 90 mcg/actuation inhaler INHALE 2 PUFFS BY MOUTH EVERY 6 HOURS AS NEEDED FOR WHEEZING 54 g 0    albuterol-ipratropium (DUO-NEB) 2.5 mg-0.5 mg/3 mL nebulizer solution Take 3 mLs by nebulization every 6 (six) hours as needed for Wheezing or Shortness of Breath. Rescue 100 mL 3    atorvastatin (LIPITOR) 40 MG tablet Take 1 tablet (40 mg total) by mouth once daily. 90 tablet 3    blood sugar diagnostic Strp 1 each by Misc.(Non-Drug; Combo Route) route 4 (four) times daily before meals and nightly. ACCU CHEK ELVIA PLUS METER 200 each 3    fluticasone propionate (FLONASE) 50 mcg/actuation nasal spray 1 spray (50 mcg total) by Each Nare route once daily. 15.8 mL 2    hydroCHLOROthiazide (HYDRODIURIL) 25 MG tablet Take 1 tablet (25 mg total) by mouth once daily. 30 tablet 11    hydrOXYzine  (ATARAX) 50 MG tablet Take 1-4 at night for itching. 120 tablet 3    lancets (ACCU-CHEK SOFTCLIX LANCETS) Misc 1 Device by Misc.(Non-Drug; Combo Route) route 4 (four) times daily. 200 each 3    levocetirizine (XYZAL) 5 MG tablet Take 1 tablet (5 mg total) by mouth every evening. 30 tablet 11    linaclotide (LINZESS) 290 mcg Cap Take 1 capsule (290 mcg total) by mouth once daily. 90 capsule 3    losartan (COZAAR) 100 MG tablet Take 1 tablet (100 mg total) by mouth once daily. 90 tablet 0    meloxicam (MOBIC) 15 MG tablet TAKE 1 TABLET(15 MG) BY MOUTH DAILY AS NEEDED FOR HEADACHE 90 tablet 1    meloxicam (MOBIC) 7.5 MG tablet TAKE 1 TABLET(7.5 MG) BY MOUTH EVERY DAY 90 tablet 0    metFORMIN (GLUCOPHAGE-XR) 500 MG 24 hr tablet Take 2 tablets (1,000 mg total) by mouth 2 (two) times daily with meals. 360 tablet 0    montelukast (SINGULAIR) 10 mg tablet TAKE 1 TABLET(10 MG) BY MOUTH EVERY EVENING 30 tablet 0    ondansetron (ZOFRAN) 8 MG tablet Take 1 tablet (8 mg total) by mouth every 8 (eight) hours as needed for Nausea. 90 tablet 5    oxyCODONE-acetaminophen (PERCOCET)  mg per tablet TAKE 1 TABLET BY MOUTH EVERY 8 HOURS AS NEEDED. MAY CAUSE DROWSINESS  0    pantoprazole (PROTONIX) 40 MG tablet   3    QUEtiapine (SEROQUEL) 25 MG Tab TAKE 1 TABLET(25 MG) BY MOUTH EVERY EVENING 90 tablet 0    semaglutide (OZEMPIC) 0.25 mg or 0.5 mg(2 mg/1.5 mL) PnIj Inject 0.5 mg into the skin every 7 days. 1.5 mL 3    sumatriptan (IMITREX) 50 MG tablet Take 1 tab at onset of headaches.  If no improvement in 2 hours, take another.  Do not take more than 2 tabs in 24 hours. 9 tablet 5    tiotropium (SPIRIVA) 18 mcg inhalation capsule Inhale 1 capsule (18 mcg total) into the lungs once daily. Controller 30 capsule 11    topiramate (TOPAMAX) 100 MG tablet TAKE 3 TABLETS BY MOUTH TWICE DAILY 180 tablet 11    venlafaxine (EFFEXOR-XR) 75 MG 24 hr capsule TAKE 1 CAPSULE(75 MG) BY MOUTH EVERY EVENING 90 capsule 3     "blood-glucose meter (ACCU-CHEK ELVIA PLUS METER) Misc TEST FOUR TIMES DAILY BEFORE MEALS AND EVERY NIGHT 90 each 1    budesonide-formoterol 160-4.5 mcg (SYMBICORT) 160-4.5 mcg/actuation HFAA Inhale 2 puffs into the lungs every 12 (twelve) hours. Controller 1 Inhaler 5    diclofenac sodium (VOLTAREN) 1 % Gel Apply 2 g topically once daily. 100 g 3     No current facility-administered medications on file prior to visit.        Allergies   Review of patient's allergies indicates:  No Known Allergies    Review of Systems (Pertinent positives)  Review of Systems   Constitutional: Negative.    HENT: Negative.    Eyes: Negative.    Respiratory: Negative.    Cardiovascular: Negative.    Gastrointestinal: Positive for heartburn and nausea. Negative for blood in stool, constipation, diarrhea and melena.   Skin: Negative.    Neurological: Negative.            /75 (BP Location: Left arm, Patient Position: Sitting, BP Method: Medium (Automatic))   Pulse 74   Temp 98.7 °F (37.1 °C) (Oral)   Resp 17   Ht 5' 4.02" (1.626 m)   Wt (!) 159.2 kg (350 lb 15.6 oz)   LMP 08/17/2019   SpO2 98%   BMI 60.21 kg/m²     GENERAL APPEARANCE: in no apparent distress and well developed and well nourished  HEENT: PERRLA, EOMI, Sclera clear, anicteric, Oropharynx clear, no lesions, Neck supple with midline trachea  NECK: normal, supple, no adenopathy, thyroid normal in size  RESPIRATORY: appears well, vitals normal, no respiratory distress, acyanotic, normal RR, chest clear, no wheezing, crepitations, rhonchi, normal symmetric air entry  HEART: regular rate and rhythm, S1, S2 normal, no murmur, click, rub or gallop.    ABDOMEN: abdomen is soft without tenderness, no masses, no hernias, no organomegaly, no rebound,  SKIN: no rashes, no wounds, no other lesions  PSYCH: Alert, oriented x 3, thought content appropriate, speech normal, pleasant and cooperative, good eye contact, well groomed,    Assessment/Plan:  Yanni Kaiser is a " 49 y.o. female who presents today for :    Yanni was seen today for hypertension and chest pain.    Diagnoses and all orders for this visit:    Epigastric pain  -     Comprehensive metabolic panel; Future  -     Lipase; Future      1.  Lab closed today, but patient states she will come by in the morning  2.  She was told to notify  Me if symptoms worsen  3. Symptoms could be related to Ozempic, but need to verify no pancreatitis is occurring    Carlyle Gordillo MD

## 2019-09-24 ENCOUNTER — LAB VISIT (OUTPATIENT)
Dept: LAB | Facility: HOSPITAL | Age: 49
End: 2019-09-24
Attending: FAMILY MEDICINE
Payer: MEDICARE

## 2019-09-24 ENCOUNTER — PATIENT OUTREACH (OUTPATIENT)
Dept: ADMINISTRATIVE | Facility: OTHER | Age: 49
End: 2019-09-24

## 2019-09-24 DIAGNOSIS — E11.22 TYPE 2 DIABETES MELLITUS WITH STAGE 3 CHRONIC KIDNEY DISEASE, WITH LONG-TERM CURRENT USE OF INSULIN: Primary | ICD-10-CM

## 2019-09-24 DIAGNOSIS — N18.30 TYPE 2 DIABETES MELLITUS WITH STAGE 3 CHRONIC KIDNEY DISEASE, WITH LONG-TERM CURRENT USE OF INSULIN: Primary | ICD-10-CM

## 2019-09-24 DIAGNOSIS — Z79.4 TYPE 2 DIABETES MELLITUS WITH STAGE 3 CHRONIC KIDNEY DISEASE, WITH LONG-TERM CURRENT USE OF INSULIN: Primary | ICD-10-CM

## 2019-09-24 DIAGNOSIS — R10.13 EPIGASTRIC PAIN: ICD-10-CM

## 2019-09-24 LAB
ALBUMIN SERPL BCP-MCNC: 3.3 G/DL (ref 3.5–5.2)
ALP SERPL-CCNC: 63 U/L (ref 55–135)
ALT SERPL W/O P-5'-P-CCNC: 15 U/L (ref 10–44)
ANION GAP SERPL CALC-SCNC: 5 MMOL/L (ref 8–16)
AST SERPL-CCNC: 13 U/L (ref 10–40)
BILIRUB SERPL-MCNC: 0.3 MG/DL (ref 0.1–1)
BUN SERPL-MCNC: 10 MG/DL (ref 6–20)
CALCIUM SERPL-MCNC: 8.6 MG/DL (ref 8.7–10.5)
CHLORIDE SERPL-SCNC: 109 MMOL/L (ref 95–110)
CO2 SERPL-SCNC: 27 MMOL/L (ref 23–29)
CREAT SERPL-MCNC: 1.4 MG/DL (ref 0.5–1.4)
EST. GFR  (AFRICAN AMERICAN): 52 ML/MIN/1.73 M^2
EST. GFR  (NON AFRICAN AMERICAN): 45 ML/MIN/1.73 M^2
GLUCOSE SERPL-MCNC: 95 MG/DL (ref 70–110)
LIPASE SERPL-CCNC: 33 U/L (ref 4–60)
POTASSIUM SERPL-SCNC: 3.8 MMOL/L (ref 3.5–5.1)
PROT SERPL-MCNC: 6.6 G/DL (ref 6–8.4)
SODIUM SERPL-SCNC: 141 MMOL/L (ref 136–145)

## 2019-09-24 PROCEDURE — 80053 COMPREHEN METABOLIC PANEL: CPT | Mod: HCNC

## 2019-09-24 PROCEDURE — 83690 ASSAY OF LIPASE: CPT | Mod: HCNC

## 2019-09-24 PROCEDURE — 36415 COLL VENOUS BLD VENIPUNCTURE: CPT | Mod: HCNC,PN

## 2019-09-26 ENCOUNTER — OFFICE VISIT (OUTPATIENT)
Dept: PODIATRY | Facility: CLINIC | Age: 49
End: 2019-09-26
Payer: MEDICARE

## 2019-09-26 DIAGNOSIS — M72.2 PLANTAR FASCIITIS: Primary | ICD-10-CM

## 2019-09-26 PROCEDURE — 99999 PR PBB SHADOW E&M-EST. PATIENT-LVL II: ICD-10-PCS | Mod: PBBFAC,HCNC,, | Performed by: PODIATRIST

## 2019-09-26 PROCEDURE — 99214 PR OFFICE/OUTPT VISIT, EST, LEVL IV, 30-39 MIN: ICD-10-PCS | Mod: HCNC,S$GLB,, | Performed by: PODIATRIST

## 2019-09-26 PROCEDURE — 99999 PR PBB SHADOW E&M-EST. PATIENT-LVL II: CPT | Mod: PBBFAC,HCNC,, | Performed by: PODIATRIST

## 2019-09-26 PROCEDURE — 99214 OFFICE O/P EST MOD 30 MIN: CPT | Mod: HCNC,S$GLB,, | Performed by: PODIATRIST

## 2019-09-27 RX ORDER — LOSARTAN POTASSIUM 100 MG/1
100 TABLET ORAL DAILY
Qty: 90 TABLET | Refills: 0 | Status: SHIPPED | OUTPATIENT
Start: 2019-09-27 | End: 2019-12-30

## 2019-09-27 RX ORDER — METFORMIN HYDROCHLORIDE 500 MG/1
1000 TABLET, EXTENDED RELEASE ORAL 2 TIMES DAILY WITH MEALS
Qty: 360 TABLET | Refills: 0 | Status: SHIPPED | OUTPATIENT
Start: 2019-09-27 | End: 2019-12-30

## 2019-09-29 NOTE — PROGRESS NOTES
"Subjective:      Patient ID: Yanni Kaiser is a 49 y.o. female.    Chief Complaint: Pre-op Exam (pt would like to discuss surgery)    Pt presents c/o left heel pain. Pt states she head a "pop" noise that was followed by extreme pain a few days ago.     Review of Systems   Constitution: Negative for chills, fever and malaise/fatigue.   HENT: Negative for hearing loss.    Cardiovascular: Negative for claudication.   Respiratory: Negative for shortness of breath.    Skin: Negative for flushing and rash.   Musculoskeletal: Negative for joint pain and myalgias.   Neurological: Negative for loss of balance, numbness, paresthesias and sensory change.   Psychiatric/Behavioral: Negative for altered mental status.           Objective:      Physical Exam   Constitutional: She is oriented to person, place, and time. She appears well-developed and well-nourished.   Cardiovascular:   Pulses:       Dorsalis pedis pulses are 2+ on the right side, and 2+ on the left side.        Posterior tibial pulses are 2+ on the right side, and 2+ on the left side.   Musculoskeletal:        Right knee: She exhibits no swelling and no ecchymosis.        Left knee: She exhibits no swelling and no ecchymosis.        Right ankle: She exhibits normal range of motion, no swelling, no ecchymosis and normal pulse. No lateral malleolus, no medial malleolus and no head of 5th metatarsal tenderness found. Achilles tendon exhibits no pain, no defect and normal Copeland's test results.        Left ankle: She exhibits normal range of motion, no swelling, no ecchymosis and normal pulse. No lateral malleolus, no medial malleolus and no head of 5th metatarsal tenderness found. Achilles tendon exhibits no pain and normal Copeland's test results.        Right lower leg: She exhibits no tenderness, no bony tenderness, no swelling, no edema and no deformity.        Left lower leg: She exhibits no tenderness, no swelling and no edema.        Right foot: There " is normal range of motion and no deformity.        Left foot: There is normal range of motion and no deformity.   Pain in left posterior heel, some localized edema noted tenderness noted to the plantar medial calcaneal tubercle, left at the insertion site of the plantar fascia.  No palpable dell noted   Feet:   Right Foot:   Protective Sensation: 5 sites tested. 5 sites sensed.   Left Foot:   Protective Sensation: 5 sites tested. 5 sites sensed.   Neurological: She is alert and oriented to person, place, and time.   Gross sensation intact to b/L lower extremities   Skin: Skin is warm. Capillary refill takes more than 3 seconds. No abrasion, no bruising, no burn and no ecchymosis noted.   No open lesions noted to b/L lower extremities.      Psychiatric: She has a normal mood and affect. Her speech is normal and behavior is normal. She is attentive.             Assessment:       Encounter Diagnosis   Name Primary?    Plantar fasciitis - Left Foot Yes         Plan:       Yanni was seen today for pre-op exam.    Diagnoses and all orders for this visit:    Plantar fasciitis - Left Foot      I counseled the patient on her conditions, their implications and medical management.      The patient has decided to forego any further conservative treatment and opted for surgical intervention.  Alternative treatments, and benefits of surgery were discussed with the patient.  Risks and complications, including but not limited to: pain, swelling, numbness, infection, failure to achieve the stated goals, recurrence of problem, nerve and blood vessel damage, scar tissue formation, further need of surgery, loss of limb or life, was discussed in detail with the patient.  All questions asked were answered, and written informed consent was sign and received. The patient will undergo plantar fasciectomy left foot.    .

## 2019-10-02 ENCOUNTER — LAB VISIT (OUTPATIENT)
Dept: LAB | Facility: HOSPITAL | Age: 49
End: 2019-10-02
Attending: INTERNAL MEDICINE
Payer: MEDICARE

## 2019-10-02 DIAGNOSIS — A04.8 H. PYLORI INFECTION: ICD-10-CM

## 2019-10-02 PROCEDURE — 87338 HPYLORI STOOL AG IA: CPT | Mod: HCNC

## 2019-10-06 LAB — H PYLORI AG STL QL IA: DETECTED

## 2019-10-07 ENCOUNTER — TELEPHONE (OUTPATIENT)
Dept: GASTROENTEROLOGY | Facility: CLINIC | Age: 49
End: 2019-10-07

## 2019-10-07 DIAGNOSIS — A04.8 H. PYLORI INFECTION: Primary | ICD-10-CM

## 2019-10-07 RX ORDER — ESOMEPRAZOLE MAGNESIUM 40 MG/1
40 CAPSULE, DELAYED RELEASE ORAL
Qty: 28 CAPSULE | Refills: 0 | Status: SHIPPED | OUTPATIENT
Start: 2019-10-07 | End: 2019-10-28

## 2019-10-07 RX ORDER — CLARITHROMYCIN 500 MG/1
500 TABLET, FILM COATED ORAL 2 TIMES DAILY
Qty: 28 TABLET | Refills: 0 | Status: SHIPPED | OUTPATIENT
Start: 2019-10-07 | End: 2019-10-21

## 2019-10-07 RX ORDER — AMOXICILLIN 500 MG/1
1000 CAPSULE ORAL 2 TIMES DAILY
Qty: 56 CAPSULE | Refills: 0 | Status: SHIPPED | OUTPATIENT
Start: 2019-10-07 | End: 2019-10-21

## 2019-10-07 NOTE — TELEPHONE ENCOUNTER
"Pls let pt know that H. Pylori test is positive.      H. pylori infection occurs when a type of bacteria called "H. pylori" infects a person's stomach.  Many people have H. pylori infection. Most of the time, H. pylori infection does not lead to any problems or cause any symptoms. But in some people, H. pylori infection leads to problems that can cause symptoms. These problems can include: ulcers (open sores on the lining of a person's stomach or small intestine) and stomach cancer.  These conditions can sometimes cause pain in the upper belly, bloating, nausea, or vomiting. Doctors do not know why H. pylori infection leads to problems in some people and not others.      It is important that we eliminate this infection and I would like to start     Triple therapy   the following treatment for 14 days:  Omeprazole 40 mg twice daily (she should hold pantoprazole or any other PPI  x 14 days)   Clarithromycin 500 mg twice daily  Amoxicillin 1 g twice daily  Metronidazole 500 mg twice daily (can be substituted for amoxicillin in penicillin-allergic individuals.     We will need to check stool H. Pylori to assure the infection has resolved.      Please arrange next available follow up appointment with mid level provider.      Dr. Sweeney           "

## 2019-10-08 ENCOUNTER — TELEPHONE (OUTPATIENT)
Dept: PODIATRY | Facility: CLINIC | Age: 49
End: 2019-10-08

## 2019-10-08 NOTE — TELEPHONE ENCOUNTER
----- Message from Abena Kiser sent at 10/8/2019  9:20 AM CDT -----  Contact: Self   Pt would like to know when her surgery will be.     [phone]

## 2019-10-08 NOTE — TELEPHONE ENCOUNTER
Called ariela back to let her know that surgery is planned for Nov.18 at 1:00 pm, arrival time will be 11:00 am. Ariela told me her skin open up around her heel, told her that this is a totally new issue and she may want to call for an appointment to see Dr. Orourke. She understand and will do so

## 2019-10-15 ENCOUNTER — PATIENT OUTREACH (OUTPATIENT)
Dept: ADMINISTRATIVE | Facility: OTHER | Age: 49
End: 2019-10-15

## 2019-10-16 ENCOUNTER — OFFICE VISIT (OUTPATIENT)
Dept: PODIATRY | Facility: CLINIC | Age: 49
End: 2019-10-16
Payer: MEDICARE

## 2019-10-16 VITALS — HEART RATE: 78 BPM | SYSTOLIC BLOOD PRESSURE: 139 MMHG | DIASTOLIC BLOOD PRESSURE: 98 MMHG

## 2019-10-16 DIAGNOSIS — R23.4 FISSURE IN SKIN OF FOOT: Primary | ICD-10-CM

## 2019-10-16 PROCEDURE — 99999 PR PBB SHADOW E&M-EST. PATIENT-LVL IV: ICD-10-PCS | Mod: PBBFAC,HCNC,, | Performed by: PODIATRIST

## 2019-10-16 PROCEDURE — 99213 PR OFFICE/OUTPT VISIT, EST, LEVL III, 20-29 MIN: ICD-10-PCS | Mod: HCNC,S$GLB,, | Performed by: PODIATRIST

## 2019-10-16 PROCEDURE — 99999 PR PBB SHADOW E&M-EST. PATIENT-LVL IV: CPT | Mod: PBBFAC,HCNC,, | Performed by: PODIATRIST

## 2019-10-16 PROCEDURE — 3075F SYST BP GE 130 - 139MM HG: CPT | Mod: HCNC,CPTII,S$GLB, | Performed by: PODIATRIST

## 2019-10-16 PROCEDURE — 3075F PR MOST RECENT SYSTOLIC BLOOD PRESS GE 130-139MM HG: ICD-10-PCS | Mod: HCNC,CPTII,S$GLB, | Performed by: PODIATRIST

## 2019-10-16 PROCEDURE — 3080F DIAST BP >= 90 MM HG: CPT | Mod: HCNC,CPTII,S$GLB, | Performed by: PODIATRIST

## 2019-10-16 PROCEDURE — 3080F PR MOST RECENT DIASTOLIC BLOOD PRESSURE >= 90 MM HG: ICD-10-PCS | Mod: HCNC,CPTII,S$GLB, | Performed by: PODIATRIST

## 2019-10-16 PROCEDURE — 99213 OFFICE O/P EST LOW 20 MIN: CPT | Mod: HCNC,S$GLB,, | Performed by: PODIATRIST

## 2019-10-23 NOTE — PROGRESS NOTES
Subjective:      Patient ID: Yanni Kaiser is a 49 y.o. female.    Chief Complaint: Diabetic Foot Exam (Pcp  Page 9/23/19) and Foot Pain    Pt presents today c/o a painful area on her left heel.     Review of Systems   Constitution: Negative for chills, fever and malaise/fatigue.   HENT: Negative for hearing loss.    Cardiovascular: Negative for claudication.   Respiratory: Negative for shortness of breath.    Skin: Positive for dry skin. Negative for flushing and rash.   Musculoskeletal: Negative for joint pain and myalgias.   Neurological: Positive for paresthesias. Negative for loss of balance, numbness and sensory change.   Psychiatric/Behavioral: Negative for altered mental status.           Objective:      Physical Exam   Cardiovascular:   Pulses:       Dorsalis pedis pulses are 2+ on the right side, and 2+ on the left side.        Posterior tibial pulses are 2+ on the right side, and 2+ on the left side.   No edema noted to b/L LEs   Musculoskeletal:   Adequate joint ROM noted to all lower extremity muscle groups with no pain or crepitation noted. Muscle strength is 5/5 in all groups bilaterally.     Feet:   Right Foot:   Protective Sensation: 5 sites tested. 5 sites sensed.   Left Foot:   Protective Sensation: 5 sites tested. 5 sites sensed.   Skin:   Heel fissure noted to left foot, superficial opening noted.              Assessment:       Encounter Diagnosis   Name Primary?    Fissure in skin of foot Yes         Plan:       Yanni was seen today for diabetic foot exam and foot pain.    Diagnoses and all orders for this visit:    Fissure in skin of foot      I counseled the patient on her conditions, their implications and medical management.    Pt advised she has an open fissure on her left heel.   Abx ointment and band-aid applied to left heel.   Pt advised to keep covered until it heals.   Pt is scheduled sx with Dr HARDY next month.   Call or return to clinic prn if these symptoms worsen or  fail to improve as anticipated.      .

## 2019-10-28 ENCOUNTER — OFFICE VISIT (OUTPATIENT)
Dept: FAMILY MEDICINE | Facility: CLINIC | Age: 49
End: 2019-10-28
Payer: MEDICARE

## 2019-10-28 VITALS
BODY MASS INDEX: 50.02 KG/M2 | WEIGHT: 293 LBS | HEART RATE: 65 BPM | OXYGEN SATURATION: 99 % | RESPIRATION RATE: 17 BRPM | HEIGHT: 64 IN | DIASTOLIC BLOOD PRESSURE: 84 MMHG | TEMPERATURE: 99 F | SYSTOLIC BLOOD PRESSURE: 132 MMHG

## 2019-10-28 DIAGNOSIS — Z79.4 TYPE 2 DIABETES MELLITUS WITH STAGE 3 CHRONIC KIDNEY DISEASE, WITH LONG-TERM CURRENT USE OF INSULIN: ICD-10-CM

## 2019-10-28 DIAGNOSIS — J45.50 SEVERE PERSISTENT ASTHMA, UNSPECIFIED WHETHER COMPLICATED: ICD-10-CM

## 2019-10-28 DIAGNOSIS — N18.30 TYPE 2 DIABETES MELLITUS WITH STAGE 3 CHRONIC KIDNEY DISEASE, WITH LONG-TERM CURRENT USE OF INSULIN: ICD-10-CM

## 2019-10-28 DIAGNOSIS — Z23 IMMUNIZATION DUE: ICD-10-CM

## 2019-10-28 DIAGNOSIS — E11.22 TYPE 2 DIABETES MELLITUS WITH STAGE 3 CHRONIC KIDNEY DISEASE, WITH LONG-TERM CURRENT USE OF INSULIN: ICD-10-CM

## 2019-10-28 DIAGNOSIS — Z01.818 PREOP GENERAL PHYSICAL EXAM: Primary | ICD-10-CM

## 2019-10-28 DIAGNOSIS — E66.01 MORBID OBESITY WITH BMI OF 50.0-59.9, ADULT: ICD-10-CM

## 2019-10-28 PROCEDURE — 3044F PR MOST RECENT HEMOGLOBIN A1C LEVEL <7.0%: ICD-10-PCS | Mod: HCNC,CPTII,S$GLB, | Performed by: FAMILY MEDICINE

## 2019-10-28 PROCEDURE — 3044F HG A1C LEVEL LT 7.0%: CPT | Mod: HCNC,CPTII,S$GLB, | Performed by: FAMILY MEDICINE

## 2019-10-28 PROCEDURE — 3075F SYST BP GE 130 - 139MM HG: CPT | Mod: HCNC,CPTII,S$GLB, | Performed by: FAMILY MEDICINE

## 2019-10-28 PROCEDURE — 99499 RISK ADDL DX/OHS AUDIT: ICD-10-PCS | Mod: HCNC,S$GLB,, | Performed by: FAMILY MEDICINE

## 2019-10-28 PROCEDURE — 99999 PR PBB SHADOW E&M-EST. PATIENT-LVL IV: CPT | Mod: PBBFAC,HCNC,, | Performed by: FAMILY MEDICINE

## 2019-10-28 PROCEDURE — 3008F PR BODY MASS INDEX (BMI) DOCUMENTED: ICD-10-PCS | Mod: HCNC,CPTII,S$GLB, | Performed by: FAMILY MEDICINE

## 2019-10-28 PROCEDURE — 90686 IIV4 VACC NO PRSV 0.5 ML IM: CPT | Mod: HCNC,S$GLB,, | Performed by: FAMILY MEDICINE

## 2019-10-28 PROCEDURE — 3079F DIAST BP 80-89 MM HG: CPT | Mod: HCNC,CPTII,S$GLB, | Performed by: FAMILY MEDICINE

## 2019-10-28 PROCEDURE — 3079F PR MOST RECENT DIASTOLIC BLOOD PRESSURE 80-89 MM HG: ICD-10-PCS | Mod: HCNC,CPTII,S$GLB, | Performed by: FAMILY MEDICINE

## 2019-10-28 PROCEDURE — 3008F BODY MASS INDEX DOCD: CPT | Mod: HCNC,CPTII,S$GLB, | Performed by: FAMILY MEDICINE

## 2019-10-28 PROCEDURE — 3075F PR MOST RECENT SYSTOLIC BLOOD PRESS GE 130-139MM HG: ICD-10-PCS | Mod: HCNC,CPTII,S$GLB, | Performed by: FAMILY MEDICINE

## 2019-10-28 PROCEDURE — 99499 UNLISTED E&M SERVICE: CPT | Mod: HCNC,S$GLB,, | Performed by: FAMILY MEDICINE

## 2019-10-28 PROCEDURE — 99214 OFFICE O/P EST MOD 30 MIN: CPT | Mod: 25,HCNC,S$GLB, | Performed by: FAMILY MEDICINE

## 2019-10-28 PROCEDURE — G0008 ADMIN INFLUENZA VIRUS VAC: HCPCS | Mod: HCNC,S$GLB,, | Performed by: FAMILY MEDICINE

## 2019-10-28 PROCEDURE — G0008 FLU VACCINE (QUAD) GREATER THAN OR EQUAL TO 3YO PRESERVATIVE FREE IM: ICD-10-PCS | Mod: HCNC,S$GLB,, | Performed by: FAMILY MEDICINE

## 2019-10-28 PROCEDURE — 90686 FLU VACCINE (QUAD) GREATER THAN OR EQUAL TO 3YO PRESERVATIVE FREE IM: ICD-10-PCS | Mod: HCNC,S$GLB,, | Performed by: FAMILY MEDICINE

## 2019-10-28 PROCEDURE — 99214 PR OFFICE/OUTPT VISIT, EST, LEVL IV, 30-39 MIN: ICD-10-PCS | Mod: 25,HCNC,S$GLB, | Performed by: FAMILY MEDICINE

## 2019-10-28 PROCEDURE — 99999 PR PBB SHADOW E&M-EST. PATIENT-LVL IV: ICD-10-PCS | Mod: PBBFAC,HCNC,, | Performed by: FAMILY MEDICINE

## 2019-10-28 RX ORDER — ALBUTEROL SULFATE 90 UG/1
2 AEROSOL, METERED RESPIRATORY (INHALATION) EVERY 6 HOURS PRN
Qty: 54 G | Refills: 0 | Status: SHIPPED | OUTPATIENT
Start: 2019-10-28 | End: 2020-09-25 | Stop reason: SDUPTHER

## 2019-10-28 NOTE — PROGRESS NOTES
Routine Office Visit    Patient Name: Yanni Kasier    : 1970  MRN: [id number]    Subjective:  Yanni is a 49 y.o. female who presents today for:    1. preop  Patient presenting today for preop exam as she is scheduled to have fasciotomy by Dr. Orourke next month.  Patient is a non-compliant COPD patient that does continue to smoke.  She has not been using her inhalers correctly.  She has a cold today, but states she feels well overall.  Patient also does have type 2 DM that is controlled.  She has been losing weight intentionally has has lost 30 pounds in 3 months.  She is followed by Dr. Salcedo for weight loss.  Patient denies any chest pain at this time.  She has no blurred vision, weakness, or paresthesias.      Past Medical History  Past Medical History:   Diagnosis Date    Allergy     Anemia     Asthma     Back pain     Chronic bronchitis     Cigarette smoker     Depression     Diabetes with neurologic complications     DUB (dysfunctional uterine bleeding) 10/16/2018    GERD (gastroesophageal reflux disease)     High cholesterol     Hyperprolactinemia     Hypertension     Obese body habitus     Pseudotumor cerebri     Renal manifestation of secondary diabetes mellitus     Respiratory failure     Seizures     Simple endometrial hyperplasia 2018    Sleep apnea     Smoker     Tobacco dependence        Past Surgical History  Past Surgical History:   Procedure Laterality Date    BREAST CYST ASPIRATION       SECTION      X 1    CHOLECYSTECTOMY      COLONOSCOPY      COLONOSCOPY N/A 2019    Procedure: COLONOSCOPY;  Surgeon: Jagruti Sweeney MD;  Location: [address]);  Service: Endoscopy;  Laterality: N/A;  BMI is 63/gastroparesis/3 days full liquid diet 1 day clear liquid see telephone encounter by Ciara Brown     ESOPHAGOGASTRODUODENOSCOPY N/A 2019    Procedure: EGD (ESOPHAGOGASTRODUODENOSCOPY);  Surgeon: Jagruti Sweeney MD;  Location: [facility]  ENDO (2ND FLR);  Service: Endoscopy;  Laterality: N/A;  BMI is 63/gastroparesis/3 days full liquid diet 1 day clear liquid see telephone encounter by Ciara limon    HYSTEROSCOPY WITH DILATION AND CURETTAGE OF UTERUS N/A 10/16/2018    [facility]    RETINAL LASER PROCEDURE Left     SINUS SURGERY      UPPER GASTROINTESTINAL ENDOSCOPY         Family History  Family History   Problem Relation Age of Onset    Hypertension Mother     Diabetes Mother     Colon cancer Mother 50    Colon polyps Mother     Heart disease Father     COPD Father     Heart attack Father     Hypertension Father     Ulcers Father     Cancer Maternal Grandmother     Breast cancer Maternal Grandmother     Colon cancer Maternal Grandmother     Colon polyps Maternal Grandmother     No Known Problems Paternal Grandmother     No Known Problems Brother     No Known Problems Maternal Aunt     No Known Problems Maternal Uncle     No Known Problems Paternal Aunt     No Known Problems Paternal Uncle     No Known Problems Maternal Grandfather     No Known Problems Paternal Grandfather     Celiac disease Neg Hx     Cirrhosis Neg Hx     Crohn's disease Neg Hx     Cystic fibrosis Neg Hx     Esophageal cancer Neg Hx     Hemochromatosis Neg Hx     Inflammatory bowel disease Neg Hx     Irritable bowel syndrome Neg Hx     Liver cancer Neg Hx     Liver disease Neg Hx     Rectal cancer Neg Hx     Stomach cancer Neg Hx     Ulcerative colitis Neg Hx     Jonnie's disease Neg Hx     Tuberculosis Neg Hx     Lymphoma Neg Hx     Scleroderma Neg Hx     Rheum arthritis Neg Hx     Melanoma Neg Hx     Multiple sclerosis Neg Hx     Psoriasis Neg Hx     Lupus Neg Hx     Skin cancer Neg Hx     Amblyopia Neg Hx     Blindness Neg Hx     Cataracts Neg Hx     Glaucoma Neg Hx     Macular degeneration Neg Hx     Retinal detachment Neg Hx     Strabismus Neg Hx     Stroke Neg Hx     Thyroid disease Neg Hx        Social  History  Social History     Socioeconomic History    Marital status:      Spouse name: Not on file    Number of children: Not on file    Years of education: Not on file    Highest education level: Not on file   Occupational History    Not on file   Social Needs    Financial resource strain: Not on file    Food insecurity:     Worry: Not on file     Inability: Not on file    Transportation needs:     Medical: Not on file     Non-medical: Not on file   Tobacco Use    Smoking status: Current Every Day Smoker     Packs/day: 1.00     Years: 27.00     Pack years: 27.00     Types: Cigarettes     Start date: 1/1/1992    Smokeless tobacco: Never Used   Substance and Sexual Activity    Alcohol use: No     Alcohol/week: 0.0 standard drinks    Drug use: No    Sexual activity: Yes     Partners: Male     Birth control/protection: None   Lifestyle    Physical activity:     Days per week: Not on file     Minutes per session: Not on file    Stress: Not on file   Relationships    Social connections:     Talks on phone: Not on file     Gets together: Not on file     Attends Yazidism service: Not on file     Active member of club or organization: Not on file     Attends meetings of clubs or organizations: Not on file     Relationship status: Not on file   Other Topics Concern    Not on file   Social History Narrative    Not on file       Current Medications  Current Outpatient Medications on File Prior to Visit   Medication Sig Dispense Refill    acetaZOLAMIDE (DIAMOX) 500 mg CpSR Take 1 capsule (500 mg total) by mouth 2 (two) times daily. 360 capsule 1    albuterol-ipratropium (DUO-NEB) 2.5 mg-0.5 mg/3 mL nebulizer solution Take 3 mLs by nebulization every 6 (six) hours as needed for Wheezing or Shortness of Breath. Rescue 100 mL 3    atorvastatin (LIPITOR) 40 MG tablet Take 1 tablet (40 mg total) by mouth once daily. 90 tablet 3    blood sugar diagnostic Strp 1 each by Misc.(Non-Drug; Combo Route) route  4 (four) times daily before meals and nightly. ACCU CHEK ELVIA PLUS METER 200 each 3    blood-glucose meter (ACCU-CHEK ELVIA PLUS METER) Misc TEST FOUR TIMES DAILY BEFORE MEALS AND EVERY NIGHT 90 each 1    budesonide-formoterol 160-4.5 mcg (SYMBICORT) 160-4.5 mcg/actuation HFAA Inhale 2 puffs into the lungs every 12 (twelve) hours. Controller 1 Inhaler 5    diclofenac sodium (VOLTAREN) 1 % Gel Apply 2 g topically once daily. 100 g 3    fluticasone propionate (FLONASE) 50 mcg/actuation nasal spray 1 spray (50 mcg total) by Each Nare route once daily. 15.8 mL 2    hydroCHLOROthiazide (HYDRODIURIL) 25 MG tablet Take 1 tablet (25 mg total) by mouth once daily. 30 tablet 11    hydrOXYzine (ATARAX) 50 MG tablet Take 1-4 at night for itching. 120 tablet 3    lancets (ACCU-CHEK SOFTCLIX LANCETS) Misc 1 Device by Misc.(Non-Drug; Combo Route) route 4 (four) times daily. 200 each 3    levocetirizine (XYZAL) 5 MG tablet Take 1 tablet (5 mg total) by mouth every evening. 30 tablet 11    linaclotide (LINZESS) 290 mcg Cap Take 1 capsule (290 mcg total) by mouth once daily. 90 capsule 3    losartan (COZAAR) 100 MG tablet Take 1 tablet (100 mg total) by mouth once daily. 90 tablet 0    meloxicam (MOBIC) 15 MG tablet TAKE 1 TABLET(15 MG) BY MOUTH DAILY AS NEEDED FOR HEADACHE 90 tablet 1    meloxicam (MOBIC) 7.5 MG tablet TAKE 1 TABLET(7.5 MG) BY MOUTH EVERY DAY 90 tablet 0    metFORMIN (GLUCOPHAGE-XR) 500 MG 24 hr tablet Take 2 tablets (1,000 mg total) by mouth 2 (two) times daily with meals. 360 tablet 0    montelukast (SINGULAIR) 10 mg tablet TAKE 1 TABLET(10 MG) BY MOUTH EVERY EVENING 30 tablet 0    ondansetron (ZOFRAN) 8 MG tablet Take 1 tablet (8 mg total) by mouth every 8 (eight) hours as needed for Nausea. 90 tablet 5    oxyCODONE-acetaminophen (PERCOCET)  mg per tablet TAKE 1 TABLET BY MOUTH EVERY 8 HOURS AS NEEDED. MAY CAUSE DROWSINESS  0    pantoprazole (PROTONIX) 40 MG tablet   3    QUEtiapine  "(SEROQUEL) 25 MG Tab TAKE 1 TABLET(25 MG) BY MOUTH EVERY EVENING 90 tablet 0    semaglutide (OZEMPIC) 0.25 mg or 0.5 mg(2 mg/1.5 mL) PnIj Inject 0.5 mg into the skin every 7 days. 1.5 mL 3    sumatriptan (IMITREX) 50 MG tablet Take 1 tab at onset of headaches.  If no improvement in 2 hours, take another.  Do not take more than 2 tabs in 24 hours. 9 tablet 5    tiotropium (SPIRIVA) 18 mcg inhalation capsule Inhale 1 capsule (18 mcg total) into the lungs once daily. Controller 30 capsule 11    topiramate (TOPAMAX) 100 MG tablet TAKE 3 TABLETS BY MOUTH TWICE DAILY 180 tablet 11    venlafaxine (EFFEXOR-XR) 75 MG 24 hr capsule TAKE 1 CAPSULE(75 MG) BY MOUTH EVERY EVENING 90 capsule 3    [DISCONTINUED] albuterol (PROVENTIL/VENTOLIN HFA) 90 mcg/actuation inhaler INHALE 2 PUFFS BY MOUTH EVERY 6 HOURS AS NEEDED FOR WHEEZING 54 g 0    [DISCONTINUED] esomeprazole (NEXIUM) 40 MG capsule Take 1 capsule (40 mg total) by mouth 2 (two) times daily before meals. for 14 days 28 capsule 0     No current facility-administered medications on file prior to visit.        Allergies   Review of patient's allergies indicates:  No Known Allergies    Review of Systems (Pertinent positives)  Review of Systems   Constitutional: Negative.  Negative for chills, diaphoresis and fever.   HENT: Positive for congestion. Negative for sore throat.    Eyes: Negative.    Respiratory: Positive for cough and sputum production. Negative for hemoptysis, shortness of breath and wheezing.    Cardiovascular: Negative.    Gastrointestinal: Negative.    Skin: Negative.    Neurological: Negative.          /84 (BP Location: Left arm, Patient Position: Sitting, BP Method: X-Large (Manual))   Pulse 65   Temp 98.7 °F (37.1 °C) (Oral)   Resp 17   Ht 5' 4.02" (1.626 m)   Wt (!) 157.6 kg (347 lb 7.1 oz)   LMP 08/17/2019   SpO2 99%   BMI 59.61 kg/m²     GENERAL APPEARANCE: in no apparent distress and well developed and well nourished  HEENT: PERDEBORAH, " EOMI, Sclera clear, anicteric, Oropharynx clear, no lesions, Neck supple with midline trachea  NECK: normal, supple, no adenopathy, thyroid normal in size  RESPIRATORY: appears well, vitals normal, no respiratory distress, acyanotic, normal RR, chest clear, no wheezing, crepitations, rhonchi, normal symmetric air entry  HEART: regular rate and rhythm, S1, S2 normal, no murmur, click, rub or gallop.    ABDOMEN: abdomen is soft without tenderness, no masses, no hernias, no organomegaly, no rebound, no guarding. Suprapubic tenderness absent. No CVA tenderness.  NEUROLOGIC: normal without focal findings, CN II-XII are intact.   SKIN: no rashes, no wounds, no other lesions  PSYCH: Alert, oriented x 3, thought content appropriate, speech normal, pleasant and cooperative, good eye contact, well groomed    Assessment/Plan:  Yanni Kaiser is a 49 y.o. female who presents today for :    Yanni was seen today for pre-op exam.    Diagnoses and all orders for this visit:    Preop general physical exam  -     Comprehensive metabolic panel; Future  -     Hemoglobin A1c; Future  -     CBC auto differential; Future  -     SCHEDULED EKG 12-LEAD (to Muse); Future    Type 2 diabetes mellitus with stage 3 chronic kidney disease, with long-term current use of insulin  -     Comprehensive metabolic panel; Future  -     Hemoglobin A1c; Future    Morbid obesity with BMI of 50.0-59.9, adult  -     CBC auto differential; Future    Immunization due  -     Influenza - Quadrivalent (PF)    Severe persistent asthma, unspecified whether complicated  -     albuterol (PROVENTIL/VENTOLIN HFA) 90 mcg/actuation inhaler; Inhale 2 puffs into the lungs every 6 (six) hours as needed. Rescue      1.  Labs and EKG ordered  2.  Flu shot given  3.  Refilled albuterol  4.  paient encouraged to quit smoking yet again  5.  She is at risk for respiratory complications from general anesthesia given underlying COPD and continued use of tobaccos  products      Carlyle Gordillo MD

## 2019-10-29 ENCOUNTER — PATIENT OUTREACH (OUTPATIENT)
Dept: ADMINISTRATIVE | Facility: OTHER | Age: 49
End: 2019-10-29

## 2019-10-29 ENCOUNTER — NURSE TRIAGE (OUTPATIENT)
Dept: ADMINISTRATIVE | Facility: CLINIC | Age: 49
End: 2019-10-29

## 2019-10-29 NOTE — TELEPHONE ENCOUNTER
Pt states she received flu vaccine to L deltoid yesterday and today c/o itching to L side of body. Pt advised per protocol and pt verbalizes understanding.     Reason for Disposition   Taking prescription medication that could cause itching (e.g., codeine/morphine/other opiates, aspirin)    Additional Information   Negative: Difficulty with breathing or swallowing starts within 2 hours after injection   Negative: Difficult to awaken or acting confused (e.g., disoriented, slurred speech)   Negative: Unresponsive, passed out, or very weak   Negative: Sounds like a life-threatening emergency to the triager   Negative: Fever > 103 F (39.4 C)   Negative: Fever > 101 F (38.3 C) and over 60 years of age   Negative: Fever > 100.0 F (37.8 C) and has diabetes mellitus or a weak immune system (e.g., HIV positive, cancer chemotherapy, organ transplant, splenectomy, chronic steroids)   Negative: Fever > 100.0 F (37.8 C) and bedridden (e.g., nursing home patient, stroke, chronic illness, recovering from surgery)   Negative: Measles vaccine and purple/blood-colored rash (onset day 6-12)   Negative: Redness or red streak around the injection site begins > 48 hours after shot   Negative: Fever present > 3 days (72 hours)   Negative: Smallpox vaccine and eye pain, eye redness, or rash on eyelids   Negative: Deep lump follows (in 2 to 8 weeks) Td or TDaP shot, and becomes tender to the touch   Negative: Patient wants to be seen   Negative: Life-threatening reaction (anaphylaxis) in the past to similar substance (e.g., food, insect bite/sting, chemical, etc.) and < 2 hours since exposure   Negative: Difficulty breathing or wheezing   Negative: Difficulty swallowing or slurred speech and sudden onset   Negative: Sounds like a life-threatening emergency to the triager   Negative: Patient sounds very sick or weak to the triager   Negative: MODERATE-SEVERE widespread itching (i.e., interferes with sleep, normal  activities or school) and not improved after 24 hours of itching Care Advice   Negative: Patient wants to be seen    Protocols used: ITCHING - WIDESPREAD-A-OH, IMMUNIZATION [id number]

## 2019-10-29 NOTE — TELEPHONE ENCOUNTER
I spoke to the pt and she reports itching and burning to the left side of her body that started early this morning. Pt received her flu shot on her left deltoid yesterday. Denies any rash but reports slight swelling to her deltoid. Pt has taken two Benadryl with no relief. Pt advised because Benadryl is not helping and she is having increasing sx she should go to the UC or ER for evaluation.

## 2019-11-01 ENCOUNTER — HOSPITAL ENCOUNTER (OUTPATIENT)
Dept: CARDIOLOGY | Facility: HOSPITAL | Age: 49
Discharge: HOME | End: 2019-11-01
Attending: FAMILY MEDICINE
Payer: MEDICARE

## 2019-11-01 DIAGNOSIS — Z01.818 PREOP GENERAL PHYSICAL EXAM: ICD-10-CM

## 2019-11-01 PROCEDURE — 93010 ELECTROCARDIOGRAM REPORT: CPT | Mod: HCNC,,, | Performed by: INTERNAL MEDICINE

## 2019-11-01 PROCEDURE — 93005 ELECTROCARDIOGRAM TRACING: CPT | Mod: HCNC

## 2019-11-01 PROCEDURE — 93010 EKG 12-LEAD: ICD-10-PCS | Mod: HCNC,,, | Performed by: INTERNAL MEDICINE

## 2019-11-05 ENCOUNTER — ANESTHESIA EVENT (OUTPATIENT)
Dept: ENDOSCOPY | Facility: HOSPITAL | Age: 49
End: 2019-11-05
Payer: MEDICARE

## 2019-11-05 ENCOUNTER — ANESTHESIA (OUTPATIENT)
Dept: ENDOSCOPY | Facility: HOSPITAL | Age: 49
End: 2019-11-05
Payer: MEDICARE

## 2019-11-05 ENCOUNTER — HOSPITAL ENCOUNTER (OUTPATIENT)
Facility: HOSPITAL | Age: 49
Discharge: HOME | End: 2019-11-05
Attending: INTERNAL MEDICINE | Admitting: INTERNAL MEDICINE
Payer: MEDICARE

## 2019-11-05 VITALS
WEIGHT: 293 LBS | DIASTOLIC BLOOD PRESSURE: 98 MMHG | HEIGHT: 64 IN | HEART RATE: 73 BPM | TEMPERATURE: 99 F | SYSTOLIC BLOOD PRESSURE: 156 MMHG | OXYGEN SATURATION: 100 % | RESPIRATION RATE: 20 BRPM | BODY MASS INDEX: 50.02 KG/M2

## 2019-11-05 DIAGNOSIS — K21.9 GASTROESOPHAGEAL REFLUX DISEASE WITHOUT ESOPHAGITIS: Primary | Chronic | ICD-10-CM

## 2019-11-05 DIAGNOSIS — R13.10 DYSPHAGIA: ICD-10-CM

## 2019-11-05 LAB
POCT GLUCOSE: 68 MG/DL (ref 70–110)
POCT GLUCOSE: 76 MG/DL (ref 70–110)
POCT GLUCOSE: 77 MG/DL (ref 70–110)

## 2019-11-05 PROCEDURE — D9220A PRA ANESTHESIA: ICD-10-PCS | Mod: HCNC,ANES,, | Performed by: ANESTHESIOLOGY

## 2019-11-05 PROCEDURE — D9220A PRA ANESTHESIA: Mod: HCNC,ANES,, | Performed by: ANESTHESIOLOGY

## 2019-11-05 PROCEDURE — 43239 PR EGD, FLEX, W/BIOPSY, SGL/MULTI: ICD-10-PCS | Mod: HCNC,,, | Performed by: INTERNAL MEDICINE

## 2019-11-05 PROCEDURE — 25000003 PHARM REV CODE 250: Mod: HCNC | Performed by: INTERNAL MEDICINE

## 2019-11-05 PROCEDURE — D9220A PRA ANESTHESIA: Mod: HCNC,CRNA,, | Performed by: NURSE ANESTHETIST, CERTIFIED REGISTERED

## 2019-11-05 PROCEDURE — 88305 TISSUE SPECIMEN TO PATHOLOGY - SURGERY: ICD-10-PCS | Mod: 26,HCNC,, | Performed by: PATHOLOGY

## 2019-11-05 PROCEDURE — 43239 EGD BIOPSY SINGLE/MULTIPLE: CPT | Mod: HCNC | Performed by: INTERNAL MEDICINE

## 2019-11-05 PROCEDURE — 63600175 PHARM REV CODE 636 W HCPCS: Mod: HCNC | Performed by: NURSE ANESTHETIST, CERTIFIED REGISTERED

## 2019-11-05 PROCEDURE — 25000003 PHARM REV CODE 250: Mod: HCNC | Performed by: ANESTHESIOLOGY

## 2019-11-05 PROCEDURE — 91040 ESOPH BALLOON DISTENSION TST: CPT | Mod: HCNC | Performed by: INTERNAL MEDICINE

## 2019-11-05 PROCEDURE — 37000009 HC ANESTHESIA EA ADD 15 MINS: Mod: HCNC | Performed by: INTERNAL MEDICINE

## 2019-11-05 PROCEDURE — 82962 GLUCOSE BLOOD TEST: CPT | Mod: HCNC | Performed by: INTERNAL MEDICINE

## 2019-11-05 PROCEDURE — D9220A PRA ANESTHESIA: ICD-10-PCS | Mod: HCNC,CRNA,, | Performed by: NURSE ANESTHETIST, CERTIFIED REGISTERED

## 2019-11-05 PROCEDURE — 88305 TISSUE EXAM BY PATHOLOGIST: CPT | Mod: HCNC | Performed by: PATHOLOGY

## 2019-11-05 PROCEDURE — 43239 EGD BIOPSY SINGLE/MULTIPLE: CPT | Mod: HCNC,,, | Performed by: INTERNAL MEDICINE

## 2019-11-05 PROCEDURE — 37000008 HC ANESTHESIA 1ST 15 MINUTES: Mod: HCNC | Performed by: INTERNAL MEDICINE

## 2019-11-05 PROCEDURE — 00731 ANES UPR GI NDSC PX NOS: CPT | Mod: HCNC | Performed by: INTERNAL MEDICINE

## 2019-11-05 PROCEDURE — 63600175 PHARM REV CODE 636 W HCPCS: Mod: HCNC | Performed by: INTERNAL MEDICINE

## 2019-11-05 PROCEDURE — 88305 TISSUE EXAM BY PATHOLOGIST: CPT | Mod: 26,HCNC,, | Performed by: PATHOLOGY

## 2019-11-05 PROCEDURE — 27201012 HC FORCEPS, HOT/COLD, DISP: Mod: HCNC | Performed by: INTERNAL MEDICINE

## 2019-11-05 RX ORDER — LIDOCAINE HYDROCHLORIDE 20 MG/ML
JELLY TOPICAL ONCE
Status: COMPLETED | OUTPATIENT
Start: 2019-11-05 | End: 2019-11-05

## 2019-11-05 RX ORDER — SODIUM CHLORIDE 0.9 % (FLUSH) 0.9 %
10 SYRINGE (ML) INJECTION
Status: DISCONTINUED | OUTPATIENT
Start: 2019-11-05 | End: 2019-11-05 | Stop reason: HOSPADM

## 2019-11-05 RX ORDER — PROPOFOL 10 MG/ML
VIAL (ML) INTRAVENOUS
Status: DISCONTINUED | OUTPATIENT
Start: 2019-11-05 | End: 2019-11-05

## 2019-11-05 RX ORDER — PROPOFOL 10 MG/ML
VIAL (ML) INTRAVENOUS CONTINUOUS PRN
Status: DISCONTINUED | OUTPATIENT
Start: 2019-11-05 | End: 2019-11-05

## 2019-11-05 RX ORDER — SODIUM CHLORIDE 9 MG/ML
INJECTION, SOLUTION INTRAVENOUS CONTINUOUS
Status: DISCONTINUED | OUTPATIENT
Start: 2019-11-05 | End: 2019-11-05 | Stop reason: HOSPADM

## 2019-11-05 RX ORDER — LIDOCAINE HCL/PF 100 MG/5ML
SYRINGE (ML) INTRAVENOUS
Status: DISCONTINUED | OUTPATIENT
Start: 2019-11-05 | End: 2019-11-05

## 2019-11-05 RX ADMIN — PROPOFOL 200 MCG/KG/MIN: 10 INJECTION, EMULSION INTRAVENOUS at 02:11

## 2019-11-05 RX ADMIN — LIDOCAINE HYDROCHLORIDE 60 MG: 20 INJECTION, SOLUTION INTRAVENOUS at 02:11

## 2019-11-05 RX ADMIN — PROPOFOL 200 MG: 10 INJECTION, EMULSION INTRAVENOUS at 02:11

## 2019-11-05 RX ADMIN — DEXTROSE 200 ML/HR: 10 SOLUTION INTRAVENOUS at 02:11

## 2019-11-05 RX ADMIN — LIDOCAINE HYDROCHLORIDE 10 ML: 20 JELLY TOPICAL at 02:11

## 2019-11-05 RX ADMIN — SODIUM CHLORIDE: 0.9 INJECTION, SOLUTION INTRAVENOUS at 01:11

## 2019-11-05 NOTE — [employer] PATIENT INSTRUCTIONS
Discharge Summary/Instructions after an Endoscopic Procedure  Patient Name: Yanni Kaiser  Patient MRN: [id number]  Patient YOB: 1970 Tuesday, November 05, 2019  Jagruti Sweeney MD  RESTRICTIONS:  During your procedure today, you received medications for sedation.  These   medications may affect your judgment, balance and coordination.  Therefore,   for 24 hours, you have the following restrictions:   - DO NOT drive a car, operate machinery, make legal/financial decisions,   sign important papers or drink alcohol.    ACTIVITY:  Today: no heavy lifting, straining or running due to procedural   sedation/anesthesia.  The following day: return to full activity including work.  DIET:  Eat and drink normally unless instructed otherwise.     TREATMENT FOR COMMON SIDE EFFECTS:  - Mild abdominal pain, nausea, belching, bloating or excessive gas:  rest,   eat lightly and use a heating pad.  - Sore Throat: treat with throat lozenges and/or gargle with warm salt   water.  - Because air was used during the procedure, expelling large amounts of air   from your rectum or belching is normal.  - If a bowel prep was taken, you may not have a bowel movement for 1-3 days.    This is normal.  SYMPTOMS TO WATCH FOR AND REPORT TO YOUR PHYSICIAN:  1. Abdominal pain or bloating, other than gas cramps.  2. Chest pain.  3. Back pain.  4. Signs of infection such as: chills or fever occurring within 24 hours   after the procedure.  5. Rectal bleeding, which would show as bright red, maroon, or black stools.   (A tablespoon of blood from the rectum is not serious, especially if   hemorrhoids are present.)  6. Vomiting.  7. Weakness or dizziness.  GO DIRECTLY TO THE NEAREST EMERGENCY ROOM IF YOU HAVE ANY OF THE FOLLOWING:      Difficulty breathing              Chills and/or fever over 101 F   Persistent vomiting and/or vomiting blood   Severe abdominal pain   Severe chest pain   Black, tarry stools   Bleeding- more than one  tablespoon   Any other symptom or condition that you feel may need urgent attention  Your doctor recommends these additional instructions:  If any biopsies were taken, your doctors clinic will contact you in 1 to 2   weeks with any results.  - Discharge patient to home (with escort).   - Resume previous diet.   - Continue present medications.   - Await pathology results.   - Return to my office as previously scheduled.   - The findings and recommendations were discussed with the patient.   - Patient has a contact number available for emergencies.  The signs and   symptoms of potential delayed complications were discussed with the   patient.  Return to normal activities tomorrow.  Written discharge   instructions were provided to the patient.  For questions, problems or results please call your physician - Jagruti Sweeney MD at Work:  [phone].  [facility], EMERGENCY ROOM PHONE NUMBER: [phone]  IF A COMPLICATION OR EMERGENCY SITUATION ARISES AND YOU ARE UNABLE TO REACH   YOUR PHYSICIAN - GO DIRECTLY TO THE EMERGENCY ROOM.  Jagruti Sweeney MD  11/5/2019 2:57:37 PM  This report has been verified and signed electronically.  [employer]

## 2019-11-05 NOTE — ANESTHESIA PREPROCEDURE EVALUATION
2019  Yanni Kaiser is a 49 y.o., female here for EGD    Past Medical History:   Diagnosis Date    Allergy     Anemia     Asthma     Back pain     Chronic bronchitis     Cigarette smoker     Depression     Diabetes with neurologic complications     DUB (dysfunctional uterine bleeding) 10/16/2018    GERD (gastroesophageal reflux disease)     High cholesterol     Hyperprolactinemia     Hypertension     Obese body habitus     Pseudotumor cerebri     Renal manifestation of secondary diabetes mellitus     Respiratory failure     Seizures     Simple endometrial hyperplasia 2018    Sleep apnea     Smoker     Tobacco dependence      Past Surgical History:   Procedure Laterality Date    BREAST CYST ASPIRATION       SECTION      X 1    CHOLECYSTECTOMY      COLONOSCOPY      COLONOSCOPY N/A 2019    Procedure: COLONOSCOPY;  Surgeon: Jagruti Sweeney MD;  Location: [facility] ([address] [place] [address]);  Service: Endoscopy;  Laterality: N/A;  BMI is 63/gastroparesis/3 days full liquid diet 1 day clear liquid see telephone encounter by Ciara Brown     ESOPHAGOGASTRODUODENOSCOPY N/A 2019    Procedure: EGD (ESOPHAGOGASTRODUODENOSCOPY);  Surgeon: Jagruti Sweeney MD;  Location: [facility] ([address] [place] [address]);  Service: Endoscopy;  Laterality: N/A;  BMI is 63/gastroparesis/3 days full liquid diet 1 day clear liquid see telephone encounter by Ciara Pinto [facility]    HYSTEROSCOPY WITH DILATION AND CURETTAGE OF UTERUS N/A 10/16/2018    [facility]    RETINAL LASER PROCEDURE Left     SINUS SURGERY      UPPER GASTROINTESTINAL ENDOSCOPY       No current facility-administered medications on file prior to encounter.      Current Outpatient Medications on File Prior to Encounter   Medication Sig Dispense Refill    acetaZOLAMIDE (DIAMOX) 500 mg CpSR Take 1 capsule (500 mg total) by mouth 2 (two) times  daily. 360 capsule 1    albuterol-ipratropium (DUO-NEB) 2.5 mg-0.5 mg/3 mL nebulizer solution Take 3 mLs by nebulization every 6 (six) hours as needed for Wheezing or Shortness of Breath. Rescue 100 mL 3    blood sugar diagnostic Strp 1 each by Misc.(Non-Drug; Combo Route) route 4 (four) times daily before meals and nightly. ACCU CHEK ELVIA PLUS METER 200 each 3    blood-glucose meter (ACCU-CHEK ELVIA PLUS METER) [facility] [place] TEST FOUR TIMES DAILY BEFORE MEALS AND EVERY NIGHT 90 each 1    budesonide-formoterol 160-4.5 mcg (SYMBICORT) 160-4.5 mcg/actuation HFAA Inhale 2 puffs into the lungs every 12 (twelve) hours. Controller 1 Inhaler 5    diclofenac sodium (VOLTAREN) 1 % Gel Apply 2 g topically once daily. 100 g 3    fluticasone propionate (FLONASE) 50 mcg/actuation nasal spray 1 spray (50 mcg total) by Each Nare route once daily. 15.8 mL 2    hydroCHLOROthiazide (HYDRODIURIL) 25 MG tablet Take 1 tablet (25 mg total) by mouth once daily. 30 tablet 11    hydrOXYzine (ATARAX) 50 MG tablet Take 1-4 at night for itching. 120 tablet 3    lancets (ACCU-CHEK SOFTCLIX LANCETS) Misc 1 Device by Misc.(Non-Drug; Combo Route) route 4 (four) times daily. 200 each 3    levocetirizine (XYZAL) 5 MG tablet Take 1 tablet (5 mg total) by mouth every evening. 30 tablet 11    linaclotide (LINZESS) 290 mcg Cap Take 1 capsule (290 mcg total) by mouth once daily. 90 capsule 3    meloxicam (MOBIC) 15 MG tablet TAKE 1 TABLET(15 MG) BY MOUTH DAILY AS NEEDED FOR HEADACHE 90 tablet 1    meloxicam (MOBIC) 7.5 MG tablet TAKE 1 TABLET(7.5 MG) BY MOUTH EVERY DAY 90 tablet 0    montelukast (SINGULAIR) 10 mg tablet TAKE 1 TABLET(10 MG) BY MOUTH EVERY EVENING 30 tablet 0    ondansetron (ZOFRAN) 8 MG tablet Take 1 tablet (8 mg total) by mouth every 8 (eight) hours as needed for Nausea. 90 tablet 5    oxyCODONE-acetaminophen (PERCOCET)  mg per tablet TAKE 1 TABLET BY MOUTH EVERY 8 HOURS AS NEEDED. MAY CAUSE DROWSINESS  0    pantoprazole  (PROTONIX) 40 MG tablet   3    semaglutide (OZEMPIC) 0.25 mg or 0.5 mg(2 mg/1.5 mL) PnIj Inject 0.5 mg into the skin every 7 days. 1.5 mL 3    sumatriptan (IMITREX) 50 MG tablet Take 1 tab at onset of headaches.  If no improvement in 2 hours, take another.  Do not take more than 2 tabs in 24 hours. 9 tablet 5    tiotropium (SPIRIVA) 18 mcg inhalation capsule Inhale 1 capsule (18 mcg total) into the lungs once daily. Controller 30 capsule 11    topiramate (TOPAMAX) 100 MG tablet TAKE 3 TABLETS BY MOUTH TWICE DAILY 180 tablet 11    venlafaxine (EFFEXOR-XR) 75 MG 24 hr capsule TAKE 1 CAPSULE(75 MG) BY MOUTH EVERY EVENING 90 capsule 3     Anesthesia Evaluation    I have reviewed the Patient Summary Reports.    I have reviewed the Nursing Notes.   I have reviewed the Medications.     Review of Systems  Anesthesia Hx:  No problems with previous Anesthesia  History of prior surgery of interest to airway management or planning: Previous anesthesia: General   Cardiovascular:   Hypertension  Functional Capacity good / => 4 METS    Pulmonary:   Asthma Sleep Apnea    Renal/:   Chronic Renal Disease    Hepatic/GI:   GERD    Neurological:   Headaches Seizures    Endocrine:   Diabetes    Psych:   Psychiatric History          Physical Exam  General:  Well nourished, Morbid Obesity    Airway/Jaw/Neck:  Airway Findings: Mouth Opening: Normal Tongue: Normal  General Airway Assessment: Adult  Mallampati: III  TM Distance: 4 - 6 cm  Jaw/Neck Findings:  Neck ROM: Normal ROM     Eyes/Ears/Nose:  EYES/EARS/NOSE FINDINGS: Normal   Dental:  Dental Findings: In tact    Chest/Lungs:  Chest/Lungs Findings: Clear to auscultation, Normal Respiratory Rate     Heart/Vascular:  Heart Findings: Rate: Normal  Rhythm: Regular Rhythm  Sounds: Normal  Vascular Findings: Normal    Abdomen:  Abdomen Findings:  Normal, Soft, Nontender      Skin:  Skin Findings: Normal    Mental Status:  Mental Status Findings:  Cooperative, Alert and Oriented          Anesthesia Plan  Type of Anesthesia, risks & benefits discussed:  Anesthesia Type:  general  Patient's Preference:   Intra-op Monitoring Plan: standard ASA monitors  Intra-op Monitoring Plan Comments:   Post Op Pain Control Plan: multimodal analgesia, IV/PO Opioids PRN and per primary service following discharge from PACU  Post Op Pain Control Plan Comments:   Induction:   IV  Beta Blocker:  Patient is not currently on a Beta-Blocker (No further documentation required).       Informed Consent: Patient understands risks and agrees with Anesthesia plan.  Questions answered. Anesthesia consent signed with patient.  ASA Score: 3     Day of Surgery Review of History & Physical:    H&P update referred to the surgeon.         Ready For Surgery From Anesthesia Perspective.

## 2019-11-05 NOTE — H&P
Short Stay Endoscopy History and Physical    PCP - Carlyle Gordillo MD     Procedure - EGD/endoFlip  ASA - per anesthesia  Mallampati - per anesthesia  History of Anesthesia problems - no  Family history Anesthesia problems -  no   Plan of anesthesia - General    HPI:  This is a 49 y.o. female here for evaluation of dysphagia, gerd:     ROS:  Constitutional: No fevers, chills, No weight loss  CV: No chest pain  Pulm: No cough, No shortness of breath  Ophtho: No vision changes  GI: see HPI  Derm: No rash    Medical History:  has a past medical history of Allergy, Anemia, Asthma, Back pain, Chronic bronchitis, Cigarette smoker, Depression, Diabetes with neurologic complications, DUB (dysfunctional uterine bleeding) (10/16/2018), GERD (gastroesophageal reflux disease), High cholesterol, Hyperprolactinemia, Hypertension, Obese body habitus, Pseudotumor cerebri, Renal manifestation of secondary diabetes mellitus, Respiratory failure, Seizures, Simple endometrial hyperplasia (), Sleep apnea, Smoker, and Tobacco dependence.    Surgical History:  has a past surgical history that includes Cholecystectomy; Sinus surgery;  section; Breast cyst aspiration; Hysteroscopy with dilation and curettage of uterus (N/A, 10/16/2018); Colonoscopy; Upper gastrointestinal endoscopy; Esophagogastroduodenoscopy (N/A, 2019); Colonoscopy (N/A, 2019); and Retinal laser procedure (Left).    Family History: family history includes Breast cancer in her maternal grandmother; COPD in her father; Cancer in her maternal grandmother; Colon cancer in her maternal grandmother; Colon cancer (age of onset: 50) in her mother; Colon polyps in her maternal grandmother and mother; Diabetes in her mother; Heart attack in her father; Heart disease in her father; Hypertension in her father and mother; No Known Problems in her brother, maternal aunt, maternal grandfather, maternal uncle, paternal aunt, paternal grandfather, paternal  grandmother, and paternal uncle; Ulcers in her father.. Otherwise no colon cancer, inflammatory bowel disease, or GI malignancies.    Social History:  reports that she has been smoking cigarettes. She started smoking about 27 years ago. She has a 27.00 pack-year smoking history. She has never used smokeless tobacco. She reports that she does not drink alcohol or use drugs.    Review of patient's allergies indicates:  No Known Allergies    Medications:   Medications Prior to Admission   Medication Sig Dispense Refill Last Dose    albuterol (PROVENTIL/VENTOLIN HFA) 90 mcg/actuation inhaler Inhale 2 puffs into the lungs every 6 (six) hours as needed. Rescue 54 g 0 11/4/2019 at Unknown time    budesonide-formoterol 160-4.5 mcg (SYMBICORT) 160-4.5 mcg/actuation HFAA Inhale 2 puffs into the lungs every 12 (twelve) hours. Controller 1 Inhaler 5 11/4/2019 at Unknown time    fluticasone propionate (FLONASE) 50 mcg/actuation nasal spray 1 spray (50 mcg total) by Each Nare route once daily. 15.8 mL 2 Past Week at Unknown time    hydrOXYzine (ATARAX) 50 MG tablet Take 1-4 at night for itching. 120 tablet 3 11/4/2019 at Unknown time    losartan (COZAAR) 100 MG tablet Take 1 tablet (100 mg total) by mouth once daily. 90 tablet 0 11/4/2019 at Unknown time    meloxicam (MOBIC) 15 MG tablet TAKE 1 TABLET(15 MG) BY MOUTH DAILY AS NEEDED FOR HEADACHE 90 tablet 1 Past Week at Unknown time    meloxicam (MOBIC) 7.5 MG tablet TAKE 1 TABLET(7.5 MG) BY MOUTH EVERY DAY 90 tablet 0 Past Week at Unknown time    montelukast (SINGULAIR) 10 mg tablet TAKE 1 TABLET(10 MG) BY MOUTH EVERY EVENING 30 tablet 0 11/4/2019 at Unknown time    ondansetron (ZOFRAN) 8 MG tablet Take 1 tablet (8 mg total) by mouth every 8 (eight) hours as needed for Nausea. 90 tablet 5 11/4/2019 at Unknown time    pantoprazole (PROTONIX) 40 MG tablet   3 11/4/2019 at Unknown time    QUEtiapine (SEROQUEL) 25 MG Tab TAKE 1 TABLET(25 MG) BY MOUTH EVERY EVENING 90  tablet 0 11/4/2019 at Unknown time    topiramate (TOPAMAX) 100 MG tablet TAKE 3 TABLETS BY MOUTH TWICE DAILY 180 tablet 11 11/4/2019 at Unknown time    venlafaxine (EFFEXOR-XR) 75 MG 24 hr capsule TAKE 1 CAPSULE(75 MG) BY MOUTH EVERY EVENING 90 capsule 3 11/4/2019 at Unknown time    acetaZOLAMIDE (DIAMOX) 500 mg CpSR Take 1 capsule (500 mg total) by mouth 2 (two) times daily. 360 capsule 1 10/22/2019    albuterol-ipratropium (DUO-NEB) 2.5 mg-0.5 mg/3 mL nebulizer solution Take 3 mLs by nebulization every 6 (six) hours as needed for Wheezing or Shortness of Breath. Rescue 100 mL 3 Taking    atorvastatin (LIPITOR) 40 MG tablet Take 1 tablet (40 mg total) by mouth once daily. 90 tablet 3 Unknown at Unknown time    blood sugar diagnostic Strp 1 each by Misc.(Non-Drug; Combo Route) route 4 (four) times daily before meals and nightly. ACCU CHEK ELVIA PLUS METER 200 each 3 Taking    blood-glucose meter (ACCU-CHEK ELVIA PLUS METER) Misc TEST FOUR TIMES DAILY BEFORE MEALS AND EVERY NIGHT 90 each 1 Taking    diclofenac sodium (VOLTAREN) 1 % Gel Apply 2 g topically once daily. 100 g 3 Unknown at Unknown time    hydroCHLOROthiazide (HYDRODIURIL) 25 MG tablet Take 1 tablet (25 mg total) by mouth once daily. 30 tablet 11 Unknown at Unknown time    lancets (ACCU-CHEK SOFTCLIX LANCETS) Misc 1 Device by Misc.(Non-Drug; Combo Route) route 4 (four) times daily. 200 each 3 Taking    levocetirizine (XYZAL) 5 MG tablet Take 1 tablet (5 mg total) by mouth every evening. 30 tablet 11 Unknown at Unknown time    linaclotide (LINZESS) 290 mcg Cap Take 1 capsule (290 mcg total) by mouth once daily. 90 capsule 3 11/2/2019    metFORMIN (GLUCOPHAGE-XR) 500 MG 24 hr tablet Take 2 tablets (1,000 mg total) by mouth 2 (two) times daily with meals. 360 tablet 0 11/1/2019    oxyCODONE-acetaminophen (PERCOCET)  mg per tablet TAKE 1 TABLET BY MOUTH EVERY 8 HOURS AS NEEDED. MAY CAUSE DROWSINESS  0 11/1/2019    semaglutide (OZEMPIC)  0.25 mg or 0.5 mg(2 mg/1.5 mL) PnIj Inject 0.5 mg into the skin every 7 days. 1.5 mL 3 10/30/2019    sumatriptan (IMITREX) 50 MG tablet Take 1 tab at onset of headaches.  If no improvement in 2 hours, take another.  Do not take more than 2 tabs in 24 hours. 9 tablet 5 11/2/2019    tiotropium (SPIRIVA) 18 mcg inhalation capsule Inhale 1 capsule (18 mcg total) into the lungs once daily. Controller 30 capsule 11 Unknown at Unknown time       Physical Exam:    Vital Signs:   Vitals:    11/05/19 1250   BP: (!) 176/93   Pulse: 64   Resp: 17   Temp: 98.1 °F (36.7 °C)       General Appearance: Well appearing in no acute distress  Eyes:    No scleral icterus  Lungs: CTA anteriorly  Heart:  Regular rate, S1, S2 normal, no murmurs heard.  Abdomen: Soft, non tender, non distended with normal bowel sounds. No hepatosplenomegaly, ascites, or mass.  Extremities: No edema  Skin: No rash    Labs:  Lab Results   Component Value Date    WBC 4.01 11/01/2019    HGB 11.8 (L) 11/01/2019    HCT 37.2 11/01/2019     11/01/2019    CHOL 221 (H) 01/30/2019    TRIG 105 01/30/2019    HDL 64 01/30/2019    ALT 12 11/01/2019    AST 10 11/01/2019     11/01/2019    K 3.6 11/01/2019     (H) 11/01/2019    CREATININE 1.4 11/01/2019    BUN 14 11/01/2019    CO2 22 (L) 11/01/2019    TSH 1.229 11/05/2018    INR 1.0 03/20/2019    HGBA1C 5.6 11/01/2019    MICROALBUR 7.2 04/01/2014       I have explained the risks and benefits of endoscopy procedures to the patient including but not limited to bleeding, perforation, infection, and death.      Jagruti Sweeney MD

## 2019-11-05 NOTE — OR NURSING
Pt blood glucose is 68, then 77, asymptomatic, VSS. Anes. Aware. New orders received to hang 100ml D10 with a goal of pt's blood glucose to be 100. Per anes.

## 2019-11-05 NOTE — PLAN OF CARE
Motility catheter placed while patient under anesthesia for EGD. Patient complained of discomfort from catheter in recovery. Dr. Sweeney and myself spoke to patient about completing manometry and expedited recovery. When patient had been recovered and given discharge instructions, she stated that she would not be able to complete test. Motility catheter removed at 1525 and Dr. Sweeney notified.

## 2019-11-05 NOTE — TRANSFER OF CARE
"Anesthesia Transfer of Care Note    Patient: Yanni Kaiser    Procedure(s) Performed: Procedure(s) (LRB):  ESOPHAGOGASTRODUODENOSCOPY (EGD) (N/A)  MANOMETRY-ESOPHAGEAL-WITH IMPEDANCE (N/A)    Patient location: PACU    Anesthesia Type: general    Transport from OR: Transported from OR on 2-3 L/min O2 by NC with adequate spontaneous ventilation    Post pain: adequate analgesia    Post assessment: no apparent anesthetic complications and tolerated procedure well    Post vital signs: stable    Level of consciousness: awake and alert    Nausea/Vomiting: no nausea/vomiting    Complications: none    Transfer of care protocol was followed      Last vitals:   Visit Vitals  BP (!) 176/93 (BP Location: Left arm, Patient Position: Lying)   Pulse 64   Temp 36.7 °C (98.1 °F) (Temporal)   Resp 17   Ht 5' 4" (1.626 m)   Wt (!) 157.4 kg (347 lb)   LMP 08/17/2019   SpO2 100%   Breastfeeding? No   BMI 59.56 kg/m²     "

## 2019-11-05 NOTE — PROGRESS NOTES
Haritha RN with GI manometry at bedside to take patient for testing. Patient refused to keep tube in and asked for Haritha RN to take tube out immediately. Manometry was removed, and patient discharged. Left via wheelchair.

## 2019-11-05 NOTE — PLAN OF CARE
Patient tolerated procedure/anesthesia well, vss, no complications or concerns. Patient complains of irritation and a choking sensation from manometry tubing. Patient coughing up clear sputum. Patient denies nausea.  Consents with chart. RN reviewed discharge instructions with patient and spouse at bedside,verbalized understanding. IV discontinued.  JOSH Mg with GI manometry at bedside to take patient for testing.

## 2019-11-05 NOTE — DISCHARGE INSTRUCTIONS
Upper GI Endoscopy     During endoscopy, a long, flexible tube is used to view the inside of your upper GI tract.      Upper GI endoscopy allows your healthcare provider to look directly into the beginning of your gastrointestinal (GI) tract. The esophagus, stomach, and duodenum (the first part of the small intestine) make up the upper GI tract.   Before the exam  Follow these and any other instructions you are given before your endoscopy. If you dont follow the healthcare providers instructions carefully, the test may need to be canceled or done over:  · Don't eat or drink anything after midnight the night before your exam. If your exam is in the afternoon, drink only clear liquids in the morning. Don't eat or drink anything for 8 hours before the exam. In some cases, you may be able to take medicines with sips of water until 2 hours before the procedure. Speak with your healthcare provider about this.   · Bring your X-rays and any other test results you have.  · Because you will be sedated, arrange for an adult to drive you home after the exam.  · Tell your healthcare provider before the exam if you are taking any medicines or have any medical problems.  The procedure  Here is what to expect:  · You will lie on the endoscopy table. Usually patients lie on the left side.  · You will be monitored and given oxygen.  · Your throat may be numbed with a spray or gargle. You are given medicine through an intravenous (IV) line that will help you relax and remain comfortable. You may be awake or asleep during the procedure.  · The healthcare provider will put the endoscope in your mouth and down your esophagus. It is thinner than most pieces of food that you swallow. It will not affect your breathing. The medicine helps keep you from gagging.  · Air is put into your GI tract to expand it. It can make you burp.  · During the procedure, the healthcare provider can take biopsies (tissue samples), remove abnormalities,  such as polyps, or treat abnormalities through a variety of devices placed through the endoscope. You will not feel this.   · The endoscope carries images of your upper GI tract to a video screen. If you are awake, you may be able to look at the images.  · After the procedure is done, you will rest for a time. An adult must drive you home.  When to call your healthcare provider  Contact your healthcare provider if you have:  · Black or tarry stools, or blood in your stool  · Fever  · Pain in your belly that does not go away  · Nausea and vomiting, or vomiting blood   Date Last Reviewed: 7/1/2016  © 3082-5916 [employer]. [address] [place] [address], [place] [address]. All rights reserved. This information is not intended as a substitute for professional medical care. Always follow your healthcare professional's instructions.        Recovery After Procedural Sedation (Adult)  You have been given medicine by vein to make you sleep during your surgery. This may have included both a pain medicine and sleeping medicine. Most of the effects have worn off. But you may still have some drowsiness for the next 6 to 8 hours.  Home care  Follow these guidelines when you get home:  · For the next 8 hours, you should be watched by a responsible adult. This person should make sure your condition is not getting worse.  · Don't drink any alcohol for the next 24 hours.  · Don't drive, operate dangerous machinery, or make important business or personal decisions during the next 24 hours.  Note: Your healthcare provider may tell you not to take any medicine by mouth for pain or sleep in the next 4 hours. These medicines may react with the medicines you were given in the hospital. This could cause a much stronger response than usual.  Follow-up care  Follow up with your healthcare provider if you are not alert and back to your usual level of activity within 12 hours.  When to seek medical advice  Call your healthcare provider  right away if any of these occur:  · Drowsiness gets worse  · Weakness or dizziness gets worse  · Repeated vomiting  · You can't be awakened   Date Last Reviewed: 10/18/2016  © 0742-7558 [employer]. [address] [place] [address], [place] [address]. All rights reserved. This information is not intended as a substitute for professional medical care. Always follow your healthcare professional's instructions.

## 2019-11-06 NOTE — ANESTHESIA POSTPROCEDURE EVALUATION
Anesthesia Post Evaluation    Patient: Yanni Kaiser    Procedure(s) Performed: Procedure(s) (LRB):  ESOPHAGOGASTRODUODENOSCOPY (EGD) (N/A)  MANOMETRY-ESOPHAGEAL-WITH IMPEDANCE (N/A)    Final Anesthesia Type: general  Patient location during evaluation: PACU  Patient participation: Yes- Able to Participate  Level of consciousness: awake and alert and oriented  Post-procedure vital signs: reviewed and stable  Pain management: adequate  Airway patency: patent  PONV status at discharge: No PONV  Anesthetic complications: no      Cardiovascular status: blood pressure returned to baseline  Respiratory status: unassisted, spontaneous ventilation and room air  Hydration status: euvolemic  Follow-up not needed.          Vitals Value Taken Time   /91 11/5/2019  3:20 PM   Temp 37.1 °C (98.7 °F) 11/5/2019  2:59 PM   Pulse 71 11/5/2019  3:22 PM   Resp 20 11/5/2019  3:15 PM   SpO2 100 % 11/5/2019  3:22 PM   Vitals shown include unvalidated device data.      No case tracking events are documented in the log.      Pain/Nida Score: Nida Score: 10 (11/5/2019  3:19 PM)

## 2019-11-08 ENCOUNTER — OFFICE VISIT (OUTPATIENT)
Dept: FAMILY MEDICINE | Facility: CLINIC | Age: 49
End: 2019-11-08
Payer: MEDICARE

## 2019-11-08 VITALS
TEMPERATURE: 98 F | DIASTOLIC BLOOD PRESSURE: 88 MMHG | BODY MASS INDEX: 50.02 KG/M2 | WEIGHT: 293 LBS | HEART RATE: 68 BPM | OXYGEN SATURATION: 97 % | SYSTOLIC BLOOD PRESSURE: 138 MMHG | RESPIRATION RATE: 17 BRPM | HEIGHT: 64 IN

## 2019-11-08 DIAGNOSIS — R21 RASH: ICD-10-CM

## 2019-11-08 DIAGNOSIS — J32.8 OTHER CHRONIC SINUSITIS: Primary | ICD-10-CM

## 2019-11-08 PROCEDURE — 3079F PR MOST RECENT DIASTOLIC BLOOD PRESSURE 80-89 MM HG: ICD-10-PCS | Mod: HCNC,CPTII,S$GLB, | Performed by: FAMILY MEDICINE

## 2019-11-08 PROCEDURE — 3008F BODY MASS INDEX DOCD: CPT | Mod: HCNC,CPTII,S$GLB, | Performed by: FAMILY MEDICINE

## 2019-11-08 PROCEDURE — 3075F PR MOST RECENT SYSTOLIC BLOOD PRESS GE 130-139MM HG: ICD-10-PCS | Mod: HCNC,CPTII,S$GLB, | Performed by: FAMILY MEDICINE

## 2019-11-08 PROCEDURE — 99214 PR OFFICE/OUTPT VISIT, EST, LEVL IV, 30-39 MIN: ICD-10-PCS | Mod: HCNC,S$GLB,, | Performed by: FAMILY MEDICINE

## 2019-11-08 PROCEDURE — 99999 PR PBB SHADOW E&M-EST. PATIENT-LVL III: CPT | Mod: PBBFAC,HCNC,, | Performed by: FAMILY MEDICINE

## 2019-11-08 PROCEDURE — 3008F PR BODY MASS INDEX (BMI) DOCUMENTED: ICD-10-PCS | Mod: HCNC,CPTII,S$GLB, | Performed by: FAMILY MEDICINE

## 2019-11-08 PROCEDURE — 99214 OFFICE O/P EST MOD 30 MIN: CPT | Mod: HCNC,S$GLB,, | Performed by: FAMILY MEDICINE

## 2019-11-08 PROCEDURE — 3075F SYST BP GE 130 - 139MM HG: CPT | Mod: HCNC,CPTII,S$GLB, | Performed by: FAMILY MEDICINE

## 2019-11-08 PROCEDURE — 99999 PR PBB SHADOW E&M-EST. PATIENT-LVL III: ICD-10-PCS | Mod: PBBFAC,HCNC,, | Performed by: FAMILY MEDICINE

## 2019-11-08 PROCEDURE — 3079F DIAST BP 80-89 MM HG: CPT | Mod: HCNC,CPTII,S$GLB, | Performed by: FAMILY MEDICINE

## 2019-11-08 RX ORDER — TRIAMCINOLONE ACETONIDE 1 MG/G
OINTMENT TOPICAL 2 TIMES DAILY
Qty: 30 G | Refills: 0 | Status: SHIPPED | OUTPATIENT
Start: 2019-11-08 | End: 2020-05-13

## 2019-11-08 RX ORDER — DOXYCYCLINE 100 MG/1
100 CAPSULE ORAL EVERY 12 HOURS
Qty: 20 CAPSULE | Refills: 0 | Status: SHIPPED | OUTPATIENT
Start: 2019-11-08 | End: 2019-11-18

## 2019-11-08 RX ORDER — PREDNISONE 20 MG/1
40 TABLET ORAL DAILY
Qty: 10 TABLET | Refills: 0 | Status: SHIPPED | OUTPATIENT
Start: 2019-11-08 | End: 2019-11-13

## 2019-11-08 NOTE — PROGRESS NOTES
Routine Office Visit    Patient Name: Yanni Kaiser    : 1970  MRN: [id number]    Subjective:  Yanni is a 49 y.o. female who presents today for:    1. Cough and congestion  Patient presenting today with several days of sinus pressure, post nasal drip, cough, and wheezing.  She reports to be using inhalers as needed instead of daily.  She continues to smoke despite being told to stop.  She has not had a fevers, chills, sweats, or body aches, but has been having itching on her arms.      Past Medical History  Past Medical History:   Diagnosis Date    Allergy     Anemia     Asthma     Back pain     Chronic bronchitis     Cigarette smoker     Depression     Diabetes with neurologic complications     DUB (dysfunctional uterine bleeding) 10/16/2018    GERD (gastroesophageal reflux disease)     High cholesterol     Hyperprolactinemia     Hypertension     Obese body habitus     Pseudotumor cerebri     Renal manifestation of secondary diabetes mellitus     Respiratory failure     Seizures     Simple endometrial hyperplasia     Sleep apnea     Smoker     Tobacco dependence        Past Surgical History  Past Surgical History:   Procedure Laterality Date    BREAST CYST ASPIRATION       SECTION      X 1    CHOLECYSTECTOMY      COLONOSCOPY      COLONOSCOPY N/A 2019    Procedure: COLONOSCOPY;  Surgeon: Jagruti Sweeney MD;  Location: [address]);  Service: Endoscopy;  Laterality: N/A;  BMI is 63/gastroparesis/3 days full liquid diet 1 day clear liquid see telephone encounter by Ciara limon    ESOPHAGEAL MANOMETRY WITH MEASUREMENT OF IMPEDANCE N/A 2019    Procedure: MANOMETRY-ESOPHAGEAL-WITH IMPEDANCE;  Surgeon: Jagruti Sweeney MD;  Location: [address]);  Service: Endoscopy;  Laterality: N/A;  Hold Narcotics x 1 days   Hold TCA x 1 days  Propofol only. No fentanyl or benzodiazepine during sedation. If additional sedation needed, discuss with  Dr. Sweeney.  10/29 - pt confirmed appt    ESOPHAGOGASTRODUODENOSCOPY N/A 1/14/2019    Procedure: EGD (ESOPHAGOGASTRODUODENOSCOPY);  Surgeon: Jagruti Sweeney MD;  Location: [facility] (2ND FLR);  Service: Endoscopy;  Laterality: N/A;  BMI is 63/gastroparesis/3 days full liquid diet 1 day clear liquid see telephone encounter by Ciara limon    ESOPHAGOGASTRODUODENOSCOPY N/A 11/5/2019    Procedure: ESOPHAGOGASTRODUODENOSCOPY (EGD);  Surgeon: Jagruti Sweeney MD;  Location: [facility] (2ND FLR);  Service: Endoscopy;  Laterality: N/A;  EGD with EndoFlip /+/- Botox  2nd floor for gastroparesis/BMI 63 **367lbs**  Bariatric Stretcher needed  Manometry probe to be placed endoscopically during EGD procedure  Due to change in protocol, Full liquid diet for 3 days/ 1 day of clear liquids  Hi    HYSTEROSCOPY WITH DILATION AND CURETTAGE OF UTERUS N/A 10/16/2018    [facility]    RETINAL LASER PROCEDURE Left     SINUS SURGERY      UPPER GASTROINTESTINAL ENDOSCOPY         Family History  Family History   Problem Relation Age of Onset    Hypertension Mother     Diabetes Mother     Colon cancer Mother 50    Colon polyps Mother     Heart disease Father     COPD Father     Heart attack Father     Hypertension Father     Ulcers Father     Cancer Maternal Grandmother     Breast cancer Maternal Grandmother     Colon cancer Maternal Grandmother     Colon polyps Maternal Grandmother     No Known Problems Paternal Grandmother     No Known Problems Brother     No Known Problems Maternal Aunt     No Known Problems Maternal Uncle     No Known Problems Paternal Aunt     No Known Problems Paternal Uncle     No Known Problems Maternal Grandfather     No Known Problems Paternal Grandfather     Celiac disease Neg Hx     Cirrhosis Neg Hx     Crohn's disease Neg Hx     Cystic fibrosis Neg Hx     Esophageal cancer Neg Hx     Hemochromatosis Neg Hx     Inflammatory bowel disease Neg Hx     Irritable bowel syndrome Neg Hx      Liver cancer Neg Hx     Liver disease Neg Hx     Rectal cancer Neg Hx     Stomach cancer Neg Hx     Ulcerative colitis Neg Hx     Jonnie's disease Neg Hx     Tuberculosis Neg Hx     Lymphoma Neg Hx     Scleroderma Neg Hx     Rheum arthritis Neg Hx     Melanoma Neg Hx     Multiple sclerosis Neg Hx     Psoriasis Neg Hx     Lupus Neg Hx     Skin cancer Neg Hx     Amblyopia Neg Hx     Blindness Neg Hx     Cataracts Neg Hx     Glaucoma Neg Hx     Macular degeneration Neg Hx     Retinal detachment Neg Hx     Strabismus Neg Hx     Stroke Neg Hx     Thyroid disease Neg Hx        Social History  Social History     Socioeconomic History    Marital status:      Spouse name: Not on file    Number of children: Not on file    Years of education: Not on file    Highest education level: Not on file   Occupational History    Not on file   Social Needs    Financial resource strain: Not on file    Food insecurity:     Worry: Not on file     Inability: Not on file    Transportation needs:     Medical: Not on file     Non-medical: Not on file   Tobacco Use    Smoking status: Current Every Day Smoker     Packs/day: 1.00     Years: 27.00     Pack years: 27.00     Types: Cigarettes     Start date: 1/1/1992    Smokeless tobacco: Never Used   Substance and Sexual Activity    Alcohol use: No     Alcohol/week: 0.0 standard drinks    Drug use: No    Sexual activity: Yes     Partners: Male     Birth control/protection: None   Lifestyle    Physical activity:     Days per week: Not on file     Minutes per session: Not on file    Stress: Not on file   Relationships    Social connections:     Talks on phone: Not on file     Gets together: Not on file     Attends Orthodox service: Not on file     Active member of club or organization: Not on file     Attends meetings of clubs or organizations: Not on file     Relationship status: Not on file   Other Topics Concern    Not on file   Social History  Narrative    Not on file       Current Medications  Current Outpatient Medications on File Prior to Visit   Medication Sig Dispense Refill    acetaZOLAMIDE (DIAMOX) 500 mg CpSR Take 1 capsule (500 mg total) by mouth 2 (two) times daily. 360 capsule 1    albuterol (PROVENTIL/VENTOLIN HFA) 90 mcg/actuation inhaler Inhale 2 puffs into the lungs every 6 (six) hours as needed. Rescue 54 g 0    albuterol-ipratropium (DUO-NEB) 2.5 mg-0.5 mg/3 mL nebulizer solution Take 3 mLs by nebulization every 6 (six) hours as needed for Wheezing or Shortness of Breath. Rescue 100 mL 3    atorvastatin (LIPITOR) 40 MG tablet Take 1 tablet (40 mg total) by mouth once daily. 90 tablet 3    blood sugar diagnostic Strp 1 each by Misc.(Non-Drug; Combo Route) route 4 (four) times daily before meals and nightly. ACCU CHEK ELVIA PLUS METER 200 each 3    blood-glucose meter (ACCU-CHEK ELVIA PLUS METER) [facility] TEST FOUR TIMES DAILY BEFORE MEALS AND EVERY NIGHT 90 each 1    budesonide-formoterol 160-4.5 mcg (SYMBICORT) 160-4.5 mcg/actuation HFAA Inhale 2 puffs into the lungs every 12 (twelve) hours. Controller 1 Inhaler 5    diclofenac sodium (VOLTAREN) 1 % Gel Apply 2 g topically once daily. 100 g 3    fluticasone propionate (FLONASE) 50 mcg/actuation nasal spray 1 spray (50 mcg total) by Each Nare route once daily. 15.8 mL 2    hydroCHLOROthiazide (HYDRODIURIL) 25 MG tablet Take 1 tablet (25 mg total) by mouth once daily. 30 tablet 11    hydrOXYzine (ATARAX) 50 MG tablet Take 1-4 at night for itching. 120 tablet 3    lancets (ACCU-CHEK SOFTCLIX LANCETS) Misc 1 Device by Misc.(Non-Drug; Combo Route) route 4 (four) times daily. 200 each 3    levocetirizine (XYZAL) 5 MG tablet Take 1 tablet (5 mg total) by mouth every evening. 30 tablet 11    linaclotide (LINZESS) 290 mcg Cap Take 1 capsule (290 mcg total) by mouth once daily. 90 capsule 3    losartan (COZAAR) 100 MG tablet Take 1 tablet (100 mg total) by mouth once daily. 90 tablet 0     meloxicam (MOBIC) 15 MG tablet TAKE 1 TABLET(15 MG) BY MOUTH DAILY AS NEEDED FOR HEADACHE 90 tablet 1    meloxicam (MOBIC) 7.5 MG tablet TAKE 1 TABLET(7.5 MG) BY MOUTH EVERY DAY 90 tablet 0    metFORMIN (GLUCOPHAGE-XR) 500 MG 24 hr tablet Take 2 tablets (1,000 mg total) by mouth 2 (two) times daily with meals. 360 tablet 0    montelukast (SINGULAIR) 10 mg tablet TAKE 1 TABLET(10 MG) BY MOUTH EVERY EVENING 30 tablet 0    ondansetron (ZOFRAN) 8 MG tablet Take 1 tablet (8 mg total) by mouth every 8 (eight) hours as needed for Nausea. 90 tablet 5    oxyCODONE-acetaminophen (PERCOCET)  mg per tablet TAKE 1 TABLET BY MOUTH EVERY 8 HOURS AS NEEDED. MAY CAUSE DROWSINESS  0    pantoprazole (PROTONIX) 40 MG tablet   3    QUEtiapine (SEROQUEL) 25 MG Tab TAKE 1 TABLET(25 MG) BY MOUTH EVERY EVENING 90 tablet 0    semaglutide (OZEMPIC) 0.25 mg or 0.5 mg(2 mg/1.5 mL) PnIj Inject 0.5 mg into the skin every 7 days. 1.5 mL 3    sumatriptan (IMITREX) 50 MG tablet Take 1 tab at onset of headaches.  If no improvement in 2 hours, take another.  Do not take more than 2 tabs in 24 hours. 9 tablet 5    tiotropium (SPIRIVA) 18 mcg inhalation capsule Inhale 1 capsule (18 mcg total) into the lungs once daily. Controller 30 capsule 11    topiramate (TOPAMAX) 100 MG tablet TAKE 3 TABLETS BY MOUTH TWICE DAILY 180 tablet 11    venlafaxine (EFFEXOR-XR) 75 MG 24 hr capsule TAKE 1 CAPSULE(75 MG) BY MOUTH EVERY EVENING 90 capsule 3     No current facility-administered medications on file prior to visit.        Allergies   Review of patient's allergies indicates:  No Known Allergies    Review of Systems (Pertinent positives)  Review of Systems   Constitutional: Negative.    HENT: Positive for congestion and sinus pain.    Eyes: Negative.    Respiratory: Positive for cough, sputum production and wheezing. Negative for shortness of breath.    Cardiovascular: Negative.    Gastrointestinal: Negative.    Musculoskeletal: Negative.   "  Skin: Negative.    Neurological: Negative.          /88 (BP Location: Left arm, Patient Position: Sitting, BP Method: X-Large (Manual))   Pulse 68   Temp 98.1 °F (36.7 °C) (Oral)   Resp 17   Ht 5' 4.02" (1.626 m)   Wt (!) 156.6 kg (345 lb 3.9 oz)   LMP 08/17/2019   SpO2 97%   BMI 59.23 kg/m²     GENERAL APPEARANCE: in no apparent distress and well developed and well nourished  HEENT: PERRL, EOMI, Sclera clear, anicteric, Oropharynx clear, no lesions, Neck supple with midline trachea  NECK: normal, supple, no adenopathy, thyroid normal in size  RESPIRATORY: appears well, vitals normal, no respiratory distress, acyanotic, normal RR, mild wheezing throughout, but moving air well; no retractions  HEART: regular rate and rhythm, S1, S2 normal, no murmur, click, rub or gallop.    ABDOMEN: abdomen is soft without tenderness, no masses, no hernias, no organomegaly, no rebound, no guarding. Suprapubic tenderness absent. No CVA tenderness.  SKIN: no rashes, no wounds, no other lesions  PSYCH: Alert, oriented x 3, thought content appropriate, speech normal, pleasant and cooperative, good eye contact, well groomed    Assessment/Plan:  Yanni Kaiser is a 49 y.o. female who presents today for :    Yanni was seen today for cough and nasal congestion.    Diagnoses and all orders for this visit:    Other chronic sinusitis  -     doxycycline (VIBRAMYCIN) 100 MG Cap; Take 1 capsule (100 mg total) by mouth every 12 (twelve) hours. for 10 days  -     predniSONE (DELTASONE) 20 MG tablet; Take 2 tablets (40 mg total) by mouth once daily. for 5 days    Rash  -     triamcinolone acetonide 0.1% (KENALOG) 0.1 % ointment; Apply topically 2 (two) times daily. for 7 days      1.  Patient to take medications as prescribed  2.  Offer breathing treatment while here, but states she will do one at home  3.  She is to go to the ED for worsening symptoms  4.  Follow up as needed      Carlyle Gordillo MD    "

## 2019-11-13 ENCOUNTER — TELEPHONE (OUTPATIENT)
Dept: PODIATRY | Facility: CLINIC | Age: 49
End: 2019-11-13

## 2019-11-13 NOTE — TELEPHONE ENCOUNTER
Called pat to remind her on her upcoming surgery on 11/18/19 surgery time 1:00 pm, arrival time is 11:00 am. Told her pre op nurse will call her 1-2 days before surgery with all instructions and go over her meds with her

## 2019-11-15 ENCOUNTER — ANESTHESIA EVENT (OUTPATIENT)
Dept: SURGERY | Facility: HOSPITAL | Age: 49
End: 2019-11-15
Payer: MEDICARE

## 2019-11-15 NOTE — PRE-PROCEDURE INSTRUCTIONS
PREOP INSTRUCTIONS:No solid food ,milk or milk products for 8 hours prior to procedure.Clear liquids are allowed up to 2 hours before  procedure.Clear liquids are:water,apple juice,pedialyte,gatorade,& jello.Shower instructions as well as directions to the 2nd floor [facility] Center were given.Patient encouraged to wear loose fitting,comfortable clothing.Medication instructions for pm prior to and am of procedure reviewed.Instructed patient to avoid taking vitamins,supplements,aspirin and ibuprofen the morning of surgery.Patient stated an understanding.    Patient denies any side effects or issues with anesthesia or sedation.

## 2019-11-18 ENCOUNTER — ANESTHESIA (OUTPATIENT)
Dept: SURGERY | Facility: HOSPITAL | Age: 49
End: 2019-11-18
Payer: MEDICARE

## 2019-11-18 ENCOUNTER — HOSPITAL ENCOUNTER (OUTPATIENT)
Facility: HOSPITAL | Age: 49
Discharge: HOME | End: 2019-11-18
Attending: PODIATRIST | Admitting: PODIATRIST
Payer: MEDICARE

## 2019-11-18 VITALS
HEART RATE: 71 BPM | DIASTOLIC BLOOD PRESSURE: 70 MMHG | HEIGHT: 64 IN | BODY MASS INDEX: 50.02 KG/M2 | RESPIRATION RATE: 19 BRPM | OXYGEN SATURATION: 96 % | TEMPERATURE: 97 F | WEIGHT: 293 LBS | SYSTOLIC BLOOD PRESSURE: 138 MMHG

## 2019-11-18 DIAGNOSIS — M72.2 PLANTAR FASCIITIS: ICD-10-CM

## 2019-11-18 LAB
POCT GLUCOSE: 75 MG/DL (ref 70–110)
POCT GLUCOSE: 78 MG/DL (ref 70–110)

## 2019-11-18 PROCEDURE — 71000045 HC DOSC ROUTINE RECOVERY EA ADD'L HR: Mod: HCNC | Performed by: PODIATRIST

## 2019-11-18 PROCEDURE — D9220A PRA ANESTHESIA: ICD-10-PCS | Mod: HCNC,CRNA,, | Performed by: NURSE ANESTHETIST, CERTIFIED REGISTERED

## 2019-11-18 PROCEDURE — 82962 GLUCOSE BLOOD TEST: CPT | Mod: HCNC | Performed by: PODIATRIST

## 2019-11-18 PROCEDURE — D9220A PRA ANESTHESIA: Mod: HCNC,ANES,, | Performed by: ANESTHESIOLOGY

## 2019-11-18 PROCEDURE — 94761 N-INVAS EAR/PLS OXIMETRY MLT: CPT | Mod: HCNC

## 2019-11-18 PROCEDURE — D9220A PRA ANESTHESIA: ICD-10-PCS | Mod: HCNC,ANES,, | Performed by: ANESTHESIOLOGY

## 2019-11-18 PROCEDURE — 28008 INCISION OF FOOT FASCIA: CPT | Mod: HCNC,LT,, | Performed by: PODIATRIST

## 2019-11-18 PROCEDURE — 37000008 HC ANESTHESIA 1ST 15 MINUTES: Mod: HCNC | Performed by: PODIATRIST

## 2019-11-18 PROCEDURE — 36000706: Mod: HCNC | Performed by: PODIATRIST

## 2019-11-18 PROCEDURE — 20605 PR DRAIN/INJECT INTERMEDIATE JOINT/BURSA: ICD-10-PCS | Mod: 51,HCNC,LT, | Performed by: PODIATRIST

## 2019-11-18 PROCEDURE — 25000003 PHARM REV CODE 250: Mod: HCNC | Performed by: NURSE ANESTHETIST, CERTIFIED REGISTERED

## 2019-11-18 PROCEDURE — 20605 DRAIN/INJ JOINT/BURSA W/O US: CPT | Mod: 51,HCNC,LT, | Performed by: PODIATRIST

## 2019-11-18 PROCEDURE — 71000015 HC POSTOP RECOV 1ST HR: Mod: HCNC | Performed by: PODIATRIST

## 2019-11-18 PROCEDURE — 25000242 PHARM REV CODE 250 ALT 637 W/ HCPCS: Mod: HCNC

## 2019-11-18 PROCEDURE — 36000707: Mod: HCNC | Performed by: PODIATRIST

## 2019-11-18 PROCEDURE — 71000044 HC DOSC ROUTINE RECOVERY FIRST HOUR: Mod: HCNC | Performed by: PODIATRIST

## 2019-11-18 PROCEDURE — D9220A PRA ANESTHESIA: Mod: HCNC,CRNA,, | Performed by: NURSE ANESTHETIST, CERTIFIED REGISTERED

## 2019-11-18 PROCEDURE — 25000003 PHARM REV CODE 250: Mod: HCNC | Performed by: PODIATRIST

## 2019-11-18 PROCEDURE — 71000016 HC POSTOP RECOV ADDL HR: Mod: HCNC | Performed by: PODIATRIST

## 2019-11-18 PROCEDURE — 28008 PR INCISION OF FOOT/TOE FASCIA: ICD-10-PCS | Mod: HCNC,LT,, | Performed by: PODIATRIST

## 2019-11-18 PROCEDURE — 20550 NJX 1 TENDON SHEATH/LIGAMENT: CPT | Mod: 59,HCNC,LT, | Performed by: PODIATRIST

## 2019-11-18 PROCEDURE — 63600175 PHARM REV CODE 636 W HCPCS: Mod: HCNC | Performed by: ANESTHESIOLOGY

## 2019-11-18 PROCEDURE — 37000009 HC ANESTHESIA EA ADD 15 MINS: Mod: HCNC | Performed by: PODIATRIST

## 2019-11-18 PROCEDURE — 94640 AIRWAY INHALATION TREATMENT: CPT | Mod: HCNC

## 2019-11-18 PROCEDURE — 01470 ANES PX NRV MSC LW L/A/F NOS: CPT | Mod: HCNC | Performed by: PODIATRIST

## 2019-11-18 PROCEDURE — 63600175 PHARM REV CODE 636 W HCPCS: Mod: HCNC | Performed by: NURSE ANESTHETIST, CERTIFIED REGISTERED

## 2019-11-18 PROCEDURE — 20550 PR INJECT TENDON SHEATH/LIGAMENT: ICD-10-PCS | Mod: 59,HCNC,LT, | Performed by: PODIATRIST

## 2019-11-18 RX ORDER — DIPHENHYDRAMINE HYDROCHLORIDE 50 MG/ML
25 INJECTION INTRAMUSCULAR; INTRAVENOUS EVERY 6 HOURS PRN
Status: DISCONTINUED | OUTPATIENT
Start: 2019-11-18 | End: 2019-11-18 | Stop reason: HOSPADM

## 2019-11-18 RX ORDER — CEFAZOLIN SODIUM 1 G/3ML
INJECTION, POWDER, FOR SOLUTION INTRAMUSCULAR; INTRAVENOUS
Status: DISCONTINUED | OUTPATIENT
Start: 2019-11-18 | End: 2019-11-18

## 2019-11-18 RX ORDER — TRAMADOL HYDROCHLORIDE 100 MG/1
100 TABLET, EXTENDED RELEASE ORAL DAILY PRN
Qty: 14 TABLET | Refills: 0 | Status: SHIPPED | OUTPATIENT
Start: 2019-11-18 | End: 2020-05-13

## 2019-11-18 RX ORDER — PROPOFOL 10 MG/ML
VIAL (ML) INTRAVENOUS CONTINUOUS PRN
Status: DISCONTINUED | OUTPATIENT
Start: 2019-11-18 | End: 2019-11-18

## 2019-11-18 RX ORDER — SODIUM CHLORIDE 0.9 % (FLUSH) 0.9 %
3 SYRINGE (ML) INJECTION
Status: DISCONTINUED | OUTPATIENT
Start: 2019-11-18 | End: 2019-11-18 | Stop reason: HOSPADM

## 2019-11-18 RX ORDER — PROPOFOL 10 MG/ML
VIAL (ML) INTRAVENOUS
Status: DISCONTINUED | OUTPATIENT
Start: 2019-11-18 | End: 2019-11-18

## 2019-11-18 RX ORDER — MIDAZOLAM HYDROCHLORIDE 1 MG/ML
INJECTION, SOLUTION INTRAMUSCULAR; INTRAVENOUS
Status: DISCONTINUED | OUTPATIENT
Start: 2019-11-18 | End: 2019-11-18

## 2019-11-18 RX ORDER — IPRATROPIUM BROMIDE AND ALBUTEROL SULFATE 2.5; .5 MG/3ML; MG/3ML
SOLUTION RESPIRATORY (INHALATION)
Status: COMPLETED
Start: 2019-11-18 | End: 2019-11-18

## 2019-11-18 RX ORDER — LIDOCAINE HYDROCHLORIDE 10 MG/ML
INJECTION, SOLUTION EPIDURAL; INFILTRATION; INTRACAUDAL; PERINEURAL
Status: DISCONTINUED | OUTPATIENT
Start: 2019-11-18 | End: 2019-11-18 | Stop reason: HOSPADM

## 2019-11-18 RX ORDER — ONDANSETRON 2 MG/ML
INJECTION INTRAMUSCULAR; INTRAVENOUS
Status: DISCONTINUED | OUTPATIENT
Start: 2019-11-18 | End: 2019-11-18

## 2019-11-18 RX ORDER — LIDOCAINE HCL/PF 100 MG/5ML
SYRINGE (ML) INTRAVENOUS
Status: DISCONTINUED | OUTPATIENT
Start: 2019-11-18 | End: 2019-11-18

## 2019-11-18 RX ORDER — FENTANYL CITRATE 50 UG/ML
INJECTION, SOLUTION INTRAMUSCULAR; INTRAVENOUS
Status: DISCONTINUED | OUTPATIENT
Start: 2019-11-18 | End: 2019-11-18

## 2019-11-18 RX ORDER — SODIUM CHLORIDE 9 MG/ML
INJECTION, SOLUTION INTRAVENOUS CONTINUOUS PRN
Status: DISCONTINUED | OUTPATIENT
Start: 2019-11-18 | End: 2019-11-18

## 2019-11-18 RX ORDER — FENTANYL CITRATE 50 UG/ML
25 INJECTION, SOLUTION INTRAMUSCULAR; INTRAVENOUS EVERY 5 MIN PRN
Status: DISCONTINUED | OUTPATIENT
Start: 2019-11-18 | End: 2019-11-18 | Stop reason: HOSPADM

## 2019-11-18 RX ORDER — HYDROMORPHONE HYDROCHLORIDE 1 MG/ML
0.2 INJECTION, SOLUTION INTRAMUSCULAR; INTRAVENOUS; SUBCUTANEOUS EVERY 5 MIN PRN
Status: DISCONTINUED | OUTPATIENT
Start: 2019-11-18 | End: 2019-11-18 | Stop reason: HOSPADM

## 2019-11-18 RX ORDER — IPRATROPIUM BROMIDE AND ALBUTEROL SULFATE 2.5; .5 MG/3ML; MG/3ML
3 SOLUTION RESPIRATORY (INHALATION) ONCE
Status: COMPLETED | OUTPATIENT
Start: 2019-11-18 | End: 2019-11-18

## 2019-11-18 RX ADMIN — SODIUM CHLORIDE, SODIUM GLUCONATE, SODIUM ACETATE, POTASSIUM CHLORIDE, MAGNESIUM CHLORIDE, SODIUM PHOSPHATE, DIBASIC, AND POTASSIUM PHOSPHATE: .53; .5; .37; .037; .03; .012; .00082 INJECTION, SOLUTION INTRAVENOUS at 01:11

## 2019-11-18 RX ADMIN — FENTANYL CITRATE 25 MCG: 50 INJECTION, SOLUTION INTRAMUSCULAR; INTRAVENOUS at 01:11

## 2019-11-18 RX ADMIN — PROPOFOL 75 MCG/KG/MIN: 10 INJECTION, EMULSION INTRAVENOUS at 01:11

## 2019-11-18 RX ADMIN — FENTANYL CITRATE 25 MCG: 50 INJECTION INTRAMUSCULAR; INTRAVENOUS at 02:11

## 2019-11-18 RX ADMIN — PROPOFOL 40 MG: 10 INJECTION, EMULSION INTRAVENOUS at 01:11

## 2019-11-18 RX ADMIN — ONDANSETRON 4 MG: 2 INJECTION INTRAMUSCULAR; INTRAVENOUS at 01:11

## 2019-11-18 RX ADMIN — CEFAZOLIN 3 G: 330 INJECTION, POWDER, FOR SOLUTION INTRAMUSCULAR; INTRAVENOUS at 01:11

## 2019-11-18 RX ADMIN — FENTANYL CITRATE 25 MCG: 50 INJECTION INTRAMUSCULAR; INTRAVENOUS at 03:11

## 2019-11-18 RX ADMIN — PROPOFOL 30 MG: 10 INJECTION, EMULSION INTRAVENOUS at 01:11

## 2019-11-18 RX ADMIN — SODIUM CHLORIDE: 0.9 INJECTION, SOLUTION INTRAVENOUS at 12:11

## 2019-11-18 RX ADMIN — IPRATROPIUM BROMIDE AND ALBUTEROL SULFATE 3 ML: 2.5; .5 SOLUTION RESPIRATORY (INHALATION) at 03:11

## 2019-11-18 RX ADMIN — MIDAZOLAM HYDROCHLORIDE 2 MG: 1 INJECTION, SOLUTION INTRAMUSCULAR; INTRAVENOUS at 12:11

## 2019-11-18 RX ADMIN — LIDOCAINE HYDROCHLORIDE 100 MG: 20 INJECTION, SOLUTION INTRAVENOUS at 01:11

## 2019-11-18 RX ADMIN — IPRATROPIUM BROMIDE AND ALBUTEROL SULFATE 3 ML: .5; 3 SOLUTION RESPIRATORY (INHALATION) at 03:11

## 2019-11-18 NOTE — OP NOTE
Operative Note       Surgery Date: 11/18/2019     Surgeon(s) and Role:     * Valentino Orourke DPM - Primary     * Riccardo Ricci MD - Resident - Assisting    Pre-op Diagnosis:  Plantar fasciitis [M72.2]    Post-op Diagnosis: Post-Op Diagnosis Codes:     * Plantar fasciitis [M72.2]    Procedure(s) (LRB):  FASCIOTOMY, PLANTAR (Left)   Steroid injection of bursal sac left ankle and right plantar fascia    Anesthesia: General/MAC    Procedure in Detail/Findings:  The patient was brought to the operating room on a stretcher in a supine position. Following the successful induction of MAC anesthesia an ankle tourniquet was placed on the Left ankle.  An ankle block with 20cc of of 0.5% bupivicaine plain, and 1%Lidocaine plain was given.  The Foot was then prepped  in a sterile manner.    The Left foot was exsanguinated, and tourniquet was inflated to 250mmHg.  Attention was directed to the Left foot where a Linear incision was made with a #15 blade on the plantar foot anterior to origin of the plantar fascia on the calcaneous.  Blunt disection with tenotomy scissors was done to reveal the plantar fascia.  Rongeur was used to remove fatty tissue  Bleeding vessels were bovied as needed. A freer was used to distract the medial band of the plantar fascia, and a 15 blade was used to cut the plantar fascia.  When fasciotomy was confirmed with inspection, the wound was washed with copious amounts of saline.    Next it was injected with 1 mL Kenalog injection.  Next the left bursal sac of the retro talar head was injected with 1 mL of Kenalog.       Skin was reapproximated with3-0 monocryl and 3-0 nylon.  Tourniquet was let down.   skin glue was applied, and wound dressed with zeroform, 4x4's,kerlix, and ace    The right heel was then injected with 1 mL Kenalog.    The patient tolerated the procedure and anesthesia well. The patient was transported to recovery with vital signs stable and vascular status intact.       The patient  tolerated the procedure and anesthesia well. He was transferred to the recovery room with vital signs stable, vascular status intact, and capillary refill time < 3 seconds to the distal aspect of both feet. Following a period of post op monitoring, the patient will be discharged home with the following instructions:   1. Keep dressing dry and intact until Podiatry follow up.   2. Weight bearing as tolerated using camboot  3. All necessary prescriptions ordered and medical management will continue.       Estimated Blood Loss:less then 5mL         Specimens (From admission, onward)    None        Implants: * No implants in log *           Disposition: PACU - hemodynamically stable.           Condition: Good    Attestation:  I was present and scrubbed for the entire procedure.

## 2019-11-18 NOTE — DISCHARGE SUMMARY
[facility]  Podiatry  Discharge Summary      Patient Name: Yanni Kaiser  MRN: [id number]  Admission Date: 11/18/2019  Hospital Length of Stay: 0 days  Discharge Date and Time:  11/18/2019 2:08 PM  Attending Physician: Valentino Orourke DPM   Discharging Provider: Riccardo Cadena MD  Primary Care Provider: Carlyle Gordillo MD    HPI: Yanni Kaiser is a 49 y.o. female who  has a past medical history of Allergy, Anemia, Asthma, Back pain, Chronic bronchitis, Cigarette smoker, Depression, Diabetes with neurologic complications, DUB (dysfunctional uterine bleeding), GERD (gastroesophageal reflux disease), High cholesterol, Hyperprolactinemia, Hypertension, Obese body habitus, Pseudotumor cerebri, Renal manifestation of secondary diabetes mellitus, Respiratory failure, Seizures, Simple endometrial hyperplasia, Sleep apnea, Smoker, and Tobacco dependence.    Patient placed in outpatient surgery for below procedures.  Patient denies F/C/N/V      Procedure(s) (LRB):  FASCIOTOMY, PLANTAR (Left)      Hospital Course: Had above procedures done without complication          Significant Diagnostic Studies: Labs: All labs within the past 24 hours have been reviewed    Pending Diagnostic Studies:     None        Final Active Diagnoses:    Diagnosis Date Noted POA    Plantar fasciitis [M72.2] 11/18/2019 Yes      Problems Resolved During this Admission:      Discharged Condition: good    Disposition: Home or Self Care    Follow Up:    Patient Instructions:      Diet general     Ice to affected area     Keep surgical extremity elevated     Call MD for:  extreme fatigue     Call MD for:  persistent dizziness or light-headedness     Call MD for:  hives     Call MD for:  redness, tenderness, or signs of infection (pain, swelling, redness, odor or green/yellow discharge around incision site)     Call MD for:  difficulty breathing, headache or visual disturbances     Call MD for:  severe uncontrolled pain      Call MD for:  persistent nausea and vomiting     Call MD for:  temperature >100.4     Sponge bath only until clinic visit     Leave dressing on - Keep it clean, dry, and intact until clinic visit     Weight bearing restrictions (specify)   Order Comments: Weight bearing to cam boot for distances less then 30 feet     Medications:  Reconciled Home Medications:      Medication List      START taking these medications    traMADol 100 MG Tb24  Commonly known as:  ULTRAM-ER  Take 1 tablet (100 mg total) by mouth daily as needed.        CONTINUE taking these medications    acetaZOLAMIDE 500 mg Cpsr  Commonly known as:  DIAMOX  Take 1 capsule (500 mg total) by mouth 2 (two) times daily.     albuterol 90 mcg/actuation inhaler  Commonly known as:  PROVENTIL/VENTOLIN HFA  Inhale 2 puffs into the lungs every 6 (six) hours as needed. Rescue     albuterol-ipratropium 2.5 mg-0.5 mg/3 mL nebulizer solution  Commonly known as:  DUO-NEB  Take 3 mLs by nebulization every 6 (six) hours as needed for Wheezing or Shortness of Breath. Rescue     atorvastatin 40 MG tablet  Commonly known as:  LIPITOR  Take 1 tablet (40 mg total) by mouth once daily.     blood sugar diagnostic Strp  1 each by Misc.(Non-Drug; Combo Route) route 4 (four) times daily before meals and nightly. ACCU CHEK ELVIA PLUS METER     blood-glucose meter Misc  Commonly known as:  Accu-Chek Elvia Plus Meter  TEST FOUR TIMES DAILY BEFORE MEALS AND EVERY NIGHT     budesonide-formoterol 160-4.5 mcg 160-4.5 mcg/actuation Hfaa  Commonly known as:  SYMBICORT  Inhale 2 puffs into the lungs every 12 (twelve) hours. Controller     diclofenac sodium 1 % Gel  Commonly known as:  Voltaren  Apply 2 g topically once daily.     doxycycline 100 MG Cap  Commonly known as:  VIBRAMYCIN  Take 1 capsule (100 mg total) by mouth every 12 (twelve) hours. for 10 days     fluticasone propionate 50 mcg/actuation nasal spray  Commonly known as:  FLONASE  1 spray (50 mcg total) by Each Nare route  once daily.     hydrOXYzine 50 MG tablet  Commonly known as:  ATARAX  Take 1-4 at night for itching.     lancets Misc  Commonly known as:  Accu-Chek Softclix Lancets  1 Device by Misc.(Non-Drug; Combo Route) route 4 (four) times daily.     levocetirizine 5 MG tablet  Commonly known as:  XYZAL  Take 1 tablet (5 mg total) by mouth every evening.     linaCLOtide 290 mcg Cap capsule  Commonly known as:  Linzess  Take 1 capsule (290 mcg total) by mouth once daily.     losartan 100 MG tablet  Commonly known as:  COZAAR  Take 1 tablet (100 mg total) by mouth once daily.     * meloxicam 15 MG tablet  Commonly known as:  MOBIC  TAKE 1 TABLET(15 MG) BY MOUTH DAILY AS NEEDED FOR HEADACHE     * meloxicam 7.5 MG tablet  Commonly known as:  MOBIC  TAKE 1 TABLET(7.5 MG) BY MOUTH EVERY DAY     metFORMIN 500 MG 24 hr tablet  Commonly known as:  GLUCOPHAGE-XR  Take 2 tablets (1,000 mg total) by mouth 2 (two) times daily with meals.     montelukast 10 mg tablet  Commonly known as:  SINGULAIR  TAKE 1 TABLET(10 MG) BY MOUTH EVERY EVENING     ondansetron 8 MG tablet  Commonly known as:  Zofran  Take 1 tablet (8 mg total) by mouth every 8 (eight) hours as needed for Nausea.     oxyCODONE-acetaminophen  mg per tablet  Commonly known as:  PERCOCET  TAKE 1 TABLET BY MOUTH EVERY 8 HOURS AS NEEDED. MAY CAUSE DROWSINESS     pantoprazole 40 MG tablet  Commonly known as:  PROTONIX     QUEtiapine 25 MG Tab  Commonly known as:  SEROQUEL  TAKE 1 TABLET(25 MG) BY MOUTH EVERY EVENING     semaglutide 0.25 mg or 0.5 mg(2 mg/1.5 mL) Pnij  Commonly known as:  Ozempic  Inject 0.5 mg into the skin every 7 days.     sumatriptan 50 MG tablet  Commonly known as:  IMITREX  Take 1 tab at onset of headaches.  If no improvement in 2 hours, take another.  Do not take more than 2 tabs in 24 hours.     topiramate 100 MG tablet  Commonly known as:  TOPAMAX  TAKE 3 TABLETS BY MOUTH TWICE DAILY     triamcinolone acetonide 0.1% 0.1 % ointment  Commonly known as:   KENALOG  Apply topically 2 (two) times daily. for 7 days     venlafaxine 75 MG 24 hr capsule  Commonly known as:  EFFEXOR-XR  TAKE 1 CAPSULE(75 MG) BY MOUTH EVERY EVENING         * This list has 2 medication(s) that are the same as other medications prescribed for you. Read the directions carefully, and ask your doctor or other care provider to review them with you.              Time spent on the discharge of patient: 30 minutes    Riccardo Cadena MD  Podiatry  [facility]

## 2019-11-18 NOTE — H&P
"Subjective:      Patient ID: Yanni Kaiser is a 49 y.o. female.    Chief Complaint: No chief complaint on file.    Pt presents c/o left heel pain. Pt states she head a "pop" noise that was followed by extreme pain a few days ago.     11/18 patient here for surgery, denies F/c/N/v    Review of Systems   Constitution: Negative for chills, fever and malaise/fatigue.   HENT: Negative for hearing loss.    Cardiovascular: Negative for claudication.   Respiratory: Negative for shortness of breath.    Skin: Negative for flushing and rash.   Musculoskeletal: Negative for joint pain and myalgias.   Neurological: Negative for loss of balance, numbness, paresthesias and sensory change.   Psychiatric/Behavioral: Negative for altered mental status.           Objective:      Physical Exam   Constitutional: She is oriented to person, place, and time. She appears well-developed and well-nourished.   Cardiovascular:   Pulses:       Dorsalis pedis pulses are 2+ on the right side, and 2+ on the left side.        Posterior tibial pulses are 2+ on the right side, and 2+ on the left side.   Musculoskeletal:        Right knee: She exhibits no swelling and no ecchymosis.        Left knee: She exhibits no swelling and no ecchymosis.        Right ankle: She exhibits normal range of motion, no swelling, no ecchymosis and normal pulse. No lateral malleolus, no medial malleolus and no head of 5th metatarsal tenderness found. Achilles tendon exhibits no pain, no defect and normal Copeland's test results.        Left ankle: She exhibits normal range of motion, no swelling, no ecchymosis and normal pulse. No lateral malleolus, no medial malleolus and no head of 5th metatarsal tenderness found. Achilles tendon exhibits no pain and normal Copeland's test results.        Right lower leg: She exhibits no tenderness, no bony tenderness, no swelling, no edema and no deformity.        Left lower leg: She exhibits no tenderness, no swelling and no " edema.        Right foot: There is normal range of motion and no deformity.        Left foot: There is normal range of motion and no deformity.   Pain in left posterior heel, some localized edema noted tenderness noted to the plantar medial calcaneal tubercle, left at the insertion site of the plantar fascia.  No palpable dell noted   Feet:   Right Foot:   Protective Sensation: 5 sites tested. 5 sites sensed.   Left Foot:   Protective Sensation: 5 sites tested. 5 sites sensed.   Neurological: She is alert and oriented to person, place, and time.   Gross sensation intact to b/L lower extremities   Skin: Skin is warm. Capillary refill takes more than 3 seconds. No abrasion, no bruising, no burn and no ecchymosis noted.   No open lesions noted to b/L lower extremities.      Psychiatric: She has a normal mood and affect. Her speech is normal and behavior is normal. She is attentive.             Assessment:       Encounter Diagnosis   Name Primary?    Plantar fasciitis - Left Foot Yes         Plan:       Yanni was seen today for pre-op exam.    Diagnoses and all orders for this visit:    Plantar fasciitis - Left Foot      I counseled the patient on her conditions, their implications and medical management.      The patient has decided to forego any further conservative treatment and opted for surgical intervention.  Alternative treatments, and benefits of surgery were discussed with the patient.  Risks and complications, including but not limited to: pain, swelling, numbness, infection, failure to achieve the stated goals, recurrence of problem, nerve and blood vessel damage, scar tissue formation, further need of surgery, loss of limb or life, was discussed in detail with the patient.  All questions asked were answered, and written informed consent was sign and received. The patient will undergo plantar fasciectomy left foot.      11/18  Plan to do Left foot EPF with b/l heel injection.  .

## 2019-11-18 NOTE — PROGRESS NOTES
1400 Pt LW=961/119 KSU=261, HR=68. MD Hayes notified per CRNA and planned to come to [facility].    1407 While MD Hayes at BS, pt complained of chest pain, pointing to left breast area. All VSS and telemetry reading wnl. Pt still mildly sedated. Plan to give prn pain meds and re-evaluate. WCTM    1420 Pt has awaken complaining of mild pain to the surgical foot. Pt starts to cough aggressively and is noted to start wheezing. Pt has hx of asthma and chronic bronchitis> MD Damico with anesthesia made aware. Order for Duoneb placed STAT. Ethel in PACU made aware, en route.

## 2019-11-18 NOTE — ANESTHESIA PREPROCEDURE EVALUATION
11/18/2019  Pre-operative evaluation for Procedure(s) (LRB):  FASCIOTOMY, PLANTAR (Left)    Yanni Kaiser is a 49 y.o. female     Patient Active Problem List   Diagnosis    SHARATH (obstructive sleep apnea)    Leg varices    Migraine    Morbid obesity with BMI of 50.0-59.9, adult    Cigarette nicotine dependence without complication    Anemia of chronic disease    Mild protein malnutrition    Idiopathic intracranial hypertension    Essential hypertension    Hyperlipidemia    GERD (gastroesophageal reflux disease)    Epilepsy    Plantar fasciitis, bilateral    Type 2 diabetes mellitus with stage 3 chronic kidney disease, with long-term current use of insulin    Recurrent major depressive disorder, in full remission    Suicide attempt    Opioid dependence    Lumbar stenosis    Antalgic gait    S/P D&C (status post dilation and curettage)    Simple endometrial hyperplasia    Refractive error    Dysphagia       Review of patient's allergies indicates:  No Known Allergies    No current facility-administered medications on file prior to encounter.      Current Outpatient Medications on File Prior to Encounter   Medication Sig Dispense Refill    acetaZOLAMIDE (DIAMOX) 500 mg CpSR Take 1 capsule (500 mg total) by mouth 2 (two) times daily. 360 capsule 1    albuterol-ipratropium (DUO-NEB) 2.5 mg-0.5 mg/3 mL nebulizer solution Take 3 mLs by nebulization every 6 (six) hours as needed for Wheezing or Shortness of Breath. Rescue 100 mL 3    atorvastatin (LIPITOR) 40 MG tablet Take 1 tablet (40 mg total) by mouth once daily. 90 tablet 3    budesonide-formoterol 160-4.5 mcg (SYMBICORT) 160-4.5 mcg/actuation HFAA Inhale 2 puffs into the lungs every 12 (twelve) hours. Controller 1 Inhaler 5    diclofenac sodium (VOLTAREN) 1 % Gel Apply 2 g topically once daily. 100 g 3    fluticasone  propionate (FLONASE) 50 mcg/actuation nasal spray 1 spray (50 mcg total) by Each Nare route once daily. 15.8 mL 2    hydrOXYzine (ATARAX) 50 MG tablet Take 1-4 at night for itching. 120 tablet 3    linaclotide (LINZESS) 290 mcg Cap Take 1 capsule (290 mcg total) by mouth once daily. 90 capsule 3    losartan (COZAAR) 100 MG tablet Take 1 tablet (100 mg total) by mouth once daily. 90 tablet 0    meloxicam (MOBIC) 15 MG tablet TAKE 1 TABLET(15 MG) BY MOUTH DAILY AS NEEDED FOR HEADACHE 90 tablet 1    meloxicam (MOBIC) 7.5 MG tablet TAKE 1 TABLET(7.5 MG) BY MOUTH EVERY DAY 90 tablet 0    metFORMIN (GLUCOPHAGE-XR) 500 MG 24 hr tablet Take 2 tablets (1,000 mg total) by mouth 2 (two) times daily with meals. 360 tablet 0    montelukast (SINGULAIR) 10 mg tablet TAKE 1 TABLET(10 MG) BY MOUTH EVERY EVENING 30 tablet 0    ondansetron (ZOFRAN) 8 MG tablet Take 1 tablet (8 mg total) by mouth every 8 (eight) hours as needed for Nausea. 90 tablet 5    oxyCODONE-acetaminophen (PERCOCET)  mg per tablet TAKE 1 TABLET BY MOUTH EVERY 8 HOURS AS NEEDED. MAY CAUSE DROWSINESS  0    pantoprazole (PROTONIX) 40 MG tablet   3    QUEtiapine (SEROQUEL) 25 MG Tab TAKE 1 TABLET(25 MG) BY MOUTH EVERY EVENING 90 tablet 0    semaglutide (OZEMPIC) 0.25 mg or 0.5 mg(2 mg/1.5 mL) PnIj Inject 0.5 mg into the skin every 7 days. 1.5 mL 3    sumatriptan (IMITREX) 50 MG tablet Take 1 tab at onset of headaches.  If no improvement in 2 hours, take another.  Do not take more than 2 tabs in 24 hours. 9 tablet 5    topiramate (TOPAMAX) 100 MG tablet TAKE 3 TABLETS BY MOUTH TWICE DAILY 180 tablet 11    venlafaxine (EFFEXOR-XR) 75 MG 24 hr capsule TAKE 1 CAPSULE(75 MG) BY MOUTH EVERY EVENING 90 capsule 3    blood sugar diagnostic Strp 1 each by Misc.(Non-Drug; Combo Route) route 4 (four) times daily before meals and nightly. ACCU CHEK ELVIA PLUS METER 200 each 3    blood-glucose meter (ACCU-CHEK ELVIA PLUS METER) Misc TEST FOUR TIMES DAILY  BEFORE MEALS AND EVERY NIGHT 90 each 1    lancets (ACCU-CHEK SOFTCLIX LANCETS) Misc 1 Device by Misc.(Non-Drug; Combo Route) route 4 (four) times daily. 200 each 3    levocetirizine (XYZAL) 5 MG tablet Take 1 tablet (5 mg total) by mouth every evening. 30 tablet 11       Past Surgical History:   Procedure Laterality Date    BREAST CYST ASPIRATION       SECTION      X 1    CHOLECYSTECTOMY      COLONOSCOPY      COLONOSCOPY N/A 2019    Procedure: COLONOSCOPY;  Surgeon: Jagruti Sweeney MD;  Location: [facility] (2ND FLR);  Service: Endoscopy;  Laterality: N/A;  BMI is 63/gastroparesis/3 days full liquid diet 1 day clear liquid see telephone encounter by Ciara Brown     ESOPHAGEAL MANOMETRY WITH MEASUREMENT OF IMPEDANCE N/A 2019    Procedure: MANOMETRY-ESOPHAGEAL-WITH IMPEDANCE;  Surgeon: Jagruti Sweeney MD;  Location: [facility] (2ND FLR);  Service: Endoscopy;  Laterality: N/A;  Hold Narcotics x 1 days   Hold TCA x 1 days  Propofol only. No fentanyl or benzodiazepine during sedation. If additional sedation needed, discuss with Dr. Sweeney.  10/29 - pt confirmed appt    ESOPHAGOGASTRODUODENOSCOPY N/A 2019    Procedure: EGD (ESOPHAGOGASTRODUODENOSCOPY);  Surgeon: Jagruti Sweeney MD;  Location: [facility] (2ND FLR);  Service: Endoscopy;  Laterality: N/A;  BMI is 63/gastroparesis/3 days full liquid diet 1 day clear liquid see telephone encounter by Ciara limon    ESOPHAGOGASTRODUODENOSCOPY N/A 2019    Procedure: ESOPHAGOGASTRODUODENOSCOPY (EGD);  Surgeon: Jagruti Sweeney MD;  Location: [facility] (2ND FLR);  Service: Endoscopy;  Laterality: N/A;  EGD with EndoFlip /+/- Botox  2nd floor for gastroparesis/BMI 63 **367lbs**  Bariatric Stretcher needed  Manometry probe to be placed endoscopically during EGD procedure  Due to change in protocol, Full liquid diet for 3 days/ 1 day of clear liquids  Hi    HYSTEROSCOPY WITH DILATION AND CURETTAGE OF UTERUS N/A 10/16/2018    [facility]     RETINAL LASER PROCEDURE Left     SINUS SURGERY      UPPER GASTROINTESTINAL ENDOSCOPY         Social History     Socioeconomic History    Marital status:      Spouse name: Not on file    Number of children: Not on file    Years of education: Not on file    Highest education level: Not on file   Occupational History    Not on file   Social Needs    Financial resource strain: Not on file    Food insecurity:     Worry: Not on file     Inability: Not on file    Transportation needs:     Medical: Not on file     Non-medical: Not on file   Tobacco Use    Smoking status: Current Every Day Smoker     Packs/day: 1.00     Years: 27.00     Pack years: 27.00     Types: Cigarettes     Start date: 1/1/1992    Smokeless tobacco: Never Used   Substance and Sexual Activity    Alcohol use: No     Alcohol/week: 0.0 standard drinks    Drug use: No    Sexual activity: Yes     Partners: Male     Birth control/protection: None   Lifestyle    Physical activity:     Days per week: Not on file     Minutes per session: Not on file    Stress: Not on file   Relationships    Social connections:     Talks on phone: Not on file     Gets together: Not on file     Attends Faith service: Not on file     Active member of club or organization: Not on file     Attends meetings of clubs or organizations: Not on file     Relationship status: Not on file   Other Topics Concern    Not on file   Social History Narrative    Not on file           2D Echo:  Results for orders placed or performed during the hospital encounter of 06/09/15   2D echo with color flow doppler   Result Value Ref Range    QEF 60 55 - 65    Diastolic Dysfunction No     Est. PA Systolic Pressure 12.73     Tricuspid Valve Regurgitation TRIVIAL          Anesthesia Evaluation    I have reviewed the Patient Summary Reports.    I have reviewed the Nursing Notes.   I have reviewed the Medications.     Review of Systems  Anesthesia Hx:  No problems with  previous Anesthesia    Cardiovascular:   Hypertension    Pulmonary:   Asthma Sleep Apnea    Renal/:   Chronic Renal Disease, CRI    Hepatic/GI:   GERD    Neurological:   Seizures    Endocrine:   Diabetes, type 2        Physical Exam  General:  Obesity    Airway/Jaw/Neck:  Airway Findings: Mouth Opening: Normal Tongue: Normal  General Airway Assessment: Adult  Mallampati: II  TM Distance: Normal, at least 6 cm       Chest/Lungs:  Chest/Lungs Findings: Clear to auscultation, Normal Respiratory Rate     Heart/Vascular:  Heart Findings: Rate: Normal  Rhythm: Regular Rhythm             Anesthesia Plan  Type of Anesthesia, risks & benefits discussed:  Anesthesia Type:  general, MAC  Patient's Preference:   Intra-op Monitoring Plan: standard ASA monitors  Intra-op Monitoring Plan Comments:   Post Op Pain Control Plan: IV/PO Opioids PRN  Post Op Pain Control Plan Comments:   Induction:   IV  Beta Blocker:         Informed Consent: Patient understands risks and agrees with Anesthesia plan.  Questions answered. Anesthesia consent signed with patient.  ASA Score: 3     Day of Surgery Review of History & Physical:            Ready For Surgery From Anesthesia Perspective.

## 2019-11-18 NOTE — DISCHARGE INSTRUCTIONS
Endoscopic Plantar Fasciotomy (EPF): After Surgery  Although you may feel fine when you are discharged, it is best to have someone drive you home. Your doctor may want you to rest and recover at home for a few days. Ask your doctor when you can start walking again. If a compression dressing is used to control swelling, you may need to wear a special surgical shoe. You may also need to wear a short leg brace for up to 3 weeks.  Recovering at home    Expect your foot to feel numb right after the surgery. Then, as the local anesthesia wears off, youll probably feel some pain. To limit pain and swelling, ice the foot for 10 to 15 minutes several times a day. Also, raise the foot above heart level as often as you can. If you've been given pain medicines, take them as directed  Your first post-op visit  Your doctor may want to see you the first 1 to 2 weeks after surgery. During this post-op visit, your incisions will be checked to make sure they are healing. The compression dressing may be replaced with a smaller surgical dressing. If this occurs, you can probably start wearing tennis shoes.  When to call your healthcare provider   Call your healthcare provider if you have any of these:  · The dressing is too tight or there is marked swelling or numbness of the toes  · Pain despite taking medicines  · More than a few drops of blood at an incision site  · Signs of an infection, including fever, chills, and redness near an incision  · Skin discoloration beyond the dressing   Date Last Reviewed: 7/1/2016  © 5216-7862 [employer]. [address] [place] [address], [place] [address]. All rights reserved. This information is not intended as a substitute for professional medical care. Always follow your healthcare professional's instructions.            Foot Surgery: Plantar Fascia Problems    Strained plantar fascia  Some heel problems can result from poor foot mechanics. If your foot moves incorrectly, your ligaments  and tendons can become strained, causing pain and swelling.  Your plantar fascia is the ligament that extends from the heel of your foot to the ball of your foot. If your foot flattens too much or too little as you move, you may strain your plantar fascia. The bottom of your foot may hurt when you stand up after resting, or after prolonged movement. While the vast majority of people with plantar fascia pain can be helped with nonsurgical treatments, surgery can be considered if these treatments fail.  Plantar fascia release  To release your plantar fascia from tension, it can be partially cut near the heel bone. To keep you from walking on your foot, you may have to use crutches for a few weeks. As you heal, fibrous tissue fills the space between your heel bone and your plantar fascia.    Plantar heel spur  A heel spur may form when your plantar fascia tugs on your heel bone. The heel spur can then cause painful walking. Also, a nerve may sometimes become trapped. This can also cause or increase foot pain and swelling.  Spur removal  Your plantar fascia first may be released. Then, if the nerve is trapped, it also can be released. If the heel spur affects your walking, it may be removed. You may need crutches for a few weeks. As you heal, fibrous tissue will grow between your heel bone and your plantar fascia.  Date Last Reviewed: 10/15/2015  © 6409-5229 [employer]. [address] [place], [place] [id number]. All rights reserved. This information is not intended as a substitute for professional medical care. Always follow your healthcare professional's instructions.        PATIENT INSTRUCTIONS  POST-ANESTHESIA    IMMEDIATELY FOLLOWING SURGERY:  Do not drive or operate machinery for the first twenty four hours after surgery.  Do not make any important decisions for twenty four hours after surgery or while taking narcotic pain medications or sedatives.  If you develop intractable nausea and vomiting or a  severe headache please notify your doctor immediately.    FOLLOW-UP:  Please make an appointment with your surgeon as instructed. You do not need to follow up with anesthesia unless specifically instructed to do so.    WOUND CARE INSTRUCTIONS (if applicable):  Keep a dry clean dressing on the anesthesia/puncture wound site if there is drainage.  Once the wound has quit draining you may leave it open to air.  Generally you should leave the bandage intact for twenty four hours unless there is drainage.  If the epidural site drains for more than 36-48 hours please call the anesthesia department.    QUESTIONS?:  Please feel free to call your physician or the hospital  if you have any questions, and they will be happy to assist you.       [facility] Anesthesia Department  [address] [facility]  [phone]

## 2019-11-18 NOTE — TRANSFER OF CARE
"Anesthesia Transfer of Care Note    Patient: Yanni Kaiser    Procedure(s) Performed: Procedure(s) (LRB):  FASCIOTOMY, PLANTAR (Left)    Patient location: PACU    Anesthesia Type: general    Transport from OR: Transported from OR on 6-10 L/min O2 by face mask with adequate spontaneous ventilation    Post pain: adequate analgesia    Post assessment: no apparent anesthetic complications and tolerated procedure well    Post vital signs: stable    Level of consciousness: awake    Nausea/Vomiting: no nausea/vomiting    Complications: none    Transfer of care protocol was followed      Last vitals:   Visit Vitals  BP (!) 163/86   Pulse 70   Temp 36.7 °C (98.1 °F) (Oral)   Resp 18   Ht 5' 4" (1.626 m)   Wt (!) 156.5 kg (345 lb)   LMP 08/17/2019   SpO2 100%   Breastfeeding? No   BMI 59.22 kg/m²     "

## 2019-11-19 NOTE — ANESTHESIA POSTPROCEDURE EVALUATION
Anesthesia Post Evaluation    Patient: Yanni Kaiser    Procedure(s) Performed: Procedure(s) (LRB):  FASCIOTOMY, PLANTAR (Left)    Final Anesthesia Type: general    Patient location during evaluation: PACU  Patient participation: Yes- Able to Participate  Level of consciousness: awake and alert  Post-procedure vital signs: reviewed and stable  Pain management: adequate  Airway patency: patent    PONV status at discharge: No PONV  Anesthetic complications: no      Cardiovascular status: hemodynamically stable  Respiratory status: unassisted  Follow-up not needed.          Vitals Value Taken Time   /70 11/18/2019  4:17 PM   Temp 36.3 °C (97.3 °F) 11/18/2019  4:25 PM   Pulse 70 11/18/2019  4:28 PM   Resp 13 11/18/2019  4:27 PM   SpO2 98 % 11/18/2019  4:28 PM   Vitals shown include unvalidated device data.      No case tracking events are documented in the log.      Pain/Nida Score: Pain Rating Prior to Med Admin: 10 (11/18/2019  3:40 PM)  Pain Rating Post Med Admin: 8 (11/18/2019  4:25 PM)

## 2019-11-22 ENCOUNTER — OFFICE VISIT (OUTPATIENT)
Dept: BARIATRICS | Facility: CLINIC | Age: 49
End: 2019-11-22
Payer: MEDICARE

## 2019-11-22 VITALS
BODY MASS INDEX: 50.02 KG/M2 | SYSTOLIC BLOOD PRESSURE: 106 MMHG | WEIGHT: 293 LBS | HEIGHT: 64 IN | DIASTOLIC BLOOD PRESSURE: 62 MMHG | HEART RATE: 78 BPM

## 2019-11-22 DIAGNOSIS — Z79.4 TYPE 2 DIABETES MELLITUS WITH STAGE 3 CHRONIC KIDNEY DISEASE, WITH LONG-TERM CURRENT USE OF INSULIN: Primary | ICD-10-CM

## 2019-11-22 DIAGNOSIS — E11.22 TYPE 2 DIABETES MELLITUS WITH STAGE 3 CHRONIC KIDNEY DISEASE, WITH LONG-TERM CURRENT USE OF INSULIN: Primary | ICD-10-CM

## 2019-11-22 DIAGNOSIS — N18.30 TYPE 2 DIABETES MELLITUS WITH STAGE 3 CHRONIC KIDNEY DISEASE, WITH LONG-TERM CURRENT USE OF INSULIN: Primary | ICD-10-CM

## 2019-11-22 DIAGNOSIS — E66.01 CLASS 3 SEVERE OBESITY DUE TO EXCESS CALORIES WITH SERIOUS COMORBIDITY AND BODY MASS INDEX (BMI) OF 50.0 TO 59.9 IN ADULT: ICD-10-CM

## 2019-11-22 PROCEDURE — 99213 OFFICE O/P EST LOW 20 MIN: CPT | Mod: HCNC,S$GLB,, | Performed by: INTERNAL MEDICINE

## 2019-11-22 PROCEDURE — 3044F HG A1C LEVEL LT 7.0%: CPT | Mod: HCNC,CPTII,S$GLB, | Performed by: INTERNAL MEDICINE

## 2019-11-22 PROCEDURE — 99999 PR PBB SHADOW E&M-EST. PATIENT-LVL III: ICD-10-PCS | Mod: PBBFAC,HCNC,, | Performed by: INTERNAL MEDICINE

## 2019-11-22 PROCEDURE — 3078F PR MOST RECENT DIASTOLIC BLOOD PRESSURE < 80 MM HG: ICD-10-PCS | Mod: HCNC,CPTII,S$GLB, | Performed by: INTERNAL MEDICINE

## 2019-11-22 PROCEDURE — 3008F BODY MASS INDEX DOCD: CPT | Mod: HCNC,CPTII,S$GLB, | Performed by: INTERNAL MEDICINE

## 2019-11-22 PROCEDURE — 99213 PR OFFICE/OUTPT VISIT, EST, LEVL III, 20-29 MIN: ICD-10-PCS | Mod: HCNC,S$GLB,, | Performed by: INTERNAL MEDICINE

## 2019-11-22 PROCEDURE — 99999 PR PBB SHADOW E&M-EST. PATIENT-LVL III: CPT | Mod: PBBFAC,HCNC,, | Performed by: INTERNAL MEDICINE

## 2019-11-22 PROCEDURE — 3044F PR MOST RECENT HEMOGLOBIN A1C LEVEL <7.0%: ICD-10-PCS | Mod: HCNC,CPTII,S$GLB, | Performed by: INTERNAL MEDICINE

## 2019-11-22 PROCEDURE — 3074F SYST BP LT 130 MM HG: CPT | Mod: HCNC,CPTII,S$GLB, | Performed by: INTERNAL MEDICINE

## 2019-11-22 PROCEDURE — 3078F DIAST BP <80 MM HG: CPT | Mod: HCNC,CPTII,S$GLB, | Performed by: INTERNAL MEDICINE

## 2019-11-22 PROCEDURE — 3008F PR BODY MASS INDEX (BMI) DOCUMENTED: ICD-10-PCS | Mod: HCNC,CPTII,S$GLB, | Performed by: INTERNAL MEDICINE

## 2019-11-22 PROCEDURE — 3074F PR MOST RECENT SYSTOLIC BLOOD PRESSURE < 130 MM HG: ICD-10-PCS | Mod: HCNC,CPTII,S$GLB, | Performed by: INTERNAL MEDICINE

## 2019-11-22 NOTE — PATIENT INSTRUCTIONS
Start Ozempic 1mg once a week.      Decrease portions as soon as you start Ozempic. Some nausea in the first 2 weeks is not unusual.     If you get pain across the upper abdomen and around to your back, please call the office.       Increase activity as cleared by podiatry/ortho.     No soda, sweet tea, juices or lemonade. All drinks should be 5 calories or less.      3 meals a day made up of the following:  Unlimited green vegetables, tomatoes, mushrooms, spaghetti squash, cauliflower, meat, poultry, seafood, eggs and hard cheeses.   Milk and plain yogurt  Dressings, seasonings, condiments, etc should have less than 2 g sugars.   Beans (1-1.5 cups) or nuts (1/4 cup) can have 1 x a day.   1-2 servings of citrus fruits, berries, pineapple or melon a day (1/2 cup)  Avoid fried foods    No sweets, grains, rice, pasta, potatoes, bread, corn, peas, oatmeal, grits, tortillas, crackers, chips    No soda, sweet tea, juices or lemonade. All drinks should be 5 calories or less.     Www.dietdoctor.Digitel for recipes. Moderate carb intake        Helpful tips to survive the holidays:  - Dont save yourself for the meal: Make sure you continue to eat high protein small meals every 3-4 hours to ensure to do not become over-hungry. Avoid temptation by not showing up to a holiday party or gathering hungry.   - Plan ahead. Bring a dish to a party if you know there may not be an appropriate option.   - Choose sugar-free drinks: Stick to water or other sugar-free beverages and make sure you are getting 6-8 cups of fluid each day (but not with meals!). Avoid alcohol, carbonated beverages, and high-fat/high-sugar beverages like hot chocolate and eggnog. Try sugar-free hot cocoa made with skim milk or water, or sugar-free spiced tea to add some holiday flair to your beverage (see sugar-free mulled cider recipe below)  - Take your time: Eat mindfully. Dont graze on food throughout the day. Sit down to enjoy your small meals. Chew slowly  and thoroughly. Cut your food into small pieces before eating.  - Listen to your body: Stop eating as soon as you feel full. Do not feel pressured to try certain (or all) foods or to eat all of the food on your plate. Listen to your hunger cues.   - REMEMBER: Make your holidays about spending time with family and friends instead of focusing gatherings around food.  - Keep up your exercise routine: Make sure you continue to get regular exercise throughout the holiday season. Encourage friends and family to be active by taking a walk together after a meal, to look at holiday lights, or to window-shop.    Good Holiday Meal Options:  - Roasted Turkey, NO skin. Use low sodium broth instead of gravy.   - Stuffed Bell Peppers made WITHOUT bread crumbs or Rice. Try using parmesan cheese instead  - Gumbo, NO rice. Try picking out mostly the meat/seafood and vegetables with little broth.   - Green Bean Casserole made with 98% fat free cream of mushroom soup and crushed almonds/pecans instead of fried onions  - Side salad w/ low fat dressing. Try a different kind of salad maybe use Kale or spinach.   - Roasted non-starchy vegetables like brussel sprouts, broccoli, green beans, zucchini, butternut squash, cauliflower  - Cauliflower Mash (steam or roast cauliflower, puree w/ low fat cheese, dash of fat free milk and 2-3 sprays of I cant believe its not butter spray. Add garlic powder and black pepper to season). Use Low sodium broth instead of gravy.   - Try Loaded Cauliflower Mash (Make cauliflower like above cauliflower mash. Top with diced turkey davidson, ¼ cup low fat cheddar cheese and bake @ 350* F for 5-10 minutes, until cheese is melted. Top with minced chives, black pepper and garlic to taste).   - Homemade cranberry sauce using Splenda or another alternative sweetener. Boil fresh cranberries and add splenda to taste. Boil until cranberries break open and then simmer until it reaches the consistency you want (less time  for more watery sauce and simmer for longer to create a thicker sauce).   - Deviled eggs: make using low fat loredo, mustard, DILL relish (not sweet relish).   - Vegetable tray w/ Greek yogurt Ranch Dip. Mix 1 packet of [place] ranch dip mix w/ 16 oz low fat plain greek yogurt.     Good Holiday Dessert Options:  - High protein Pumpkin Cheesecake (see recipe below)  - Pumpkin Whip (see recipe below)  - Quest Apple Pie or Cinnamon Roll flavored protein bar (warm in microwave for 10-15 seconds)  - Eggnog Protein shake (see recipe below)  - Fresh fruit w/ low fat cheese  - Sugar-free Jello Parfaits. Layer Red and Green sugar-free jello in cups and top w/ 2 tbsp Sugar-free cool-whip    Pumpkin Cheesecake    8 ounces fat free cream cheese, softened   2 scoops of vanilla protein powder (<4 g sugar per serving)   ¼ tsp Fine salt   2 eggs, at room temperature   1/3 cup fat free sour cream  1/3 cup fat free half and half  1 15 -ounce can pure pumpkin puree   1 tablespoon pumpkin pie spice, plus more for dusting   Unsalted nuts, crushed  *Add splenda to taste    Directions     1. Preheat the oven to 300 degrees F. Line 18 muffin cups with paper liners. Sprinkle 1 tsp crushed unsalted nuts at the bottom of each of muffin cup liner.     2. In a large bowl, beat the cream cheese, vanilla protein powder and 1/4 teaspoon fine salt on medium-high speed until smooth and creamy, 2 to 3 minutes. Scrape down the sides, reduce speed to low and beat in the eggs, 1 at a time, until combined. Beat in 1/3 cup fat free sour cream and fat free half and half. Stir in the pumpkin puree and pumpkin pie spice until smooth. Divide evenly among cookie-lined paper cups, filling almost all the way to the top.     3. Bake until the filling is just set, 40 to 45 minutes. A sharp knife inserted into the center will come out moist, but clean. Cool completely in tins on a wire rack. Refrigerate until cold, 4 hours, or overnight. Top with a dusting of  pumpkin pie spice.    Recipe altered from the following recipe: http://www.[employer].com/recipes/food-network-nora/mini-pumpkin-cheesecakes-recipe.print.html?oc=linkback    Pumpkin Whip    Box of sugar-free vanilla pudding  Can of pumpkin puree  Pumpkin Pie spice (sprinkle to taste)  ½ cup of sugar-free Cool Whip    Directions:  Make sugar-free pudding according to package directions using fat free or 1% milk. Stir in pumpkin and cool whip. Add pumpkin pie spice to taste.     Egg Nog Protein shake    8 oz skim or 1% milk  1 scoop vanilla protein powder  1 tbsp sugar-free vanilla pudding mix  ½ tsp butter flavor extract  ½ tsp rum extract  ½ tsp cinnamon     Shake together or blend with ice and serve.     Sugar-Free Mulled Cider    3 oz diet cran-apple juice  6 oz water  1 packet sugar-free apple cider mix  ½ tsp apple pie spice  ½ tsp butter flavor extract  1 tbsp Sugar-free Syrup    Mix together. Warm if needed and serve w/ orange wedge and cinnamon stick.

## 2019-11-22 NOTE — PROGRESS NOTES
"Subjective:       Patient ID: Yanni Kaiser is a 49 y.o. female.    Chief Complaint: Follow-up    Pt here today for follow-up. Has lost 28 lbs. Started LCHF diet and ozempic. She is in a low ortho boot. Had a foot surgery. State she is not eating much starch. Has not given up sweet tea. Denies SE with ozempic     Follow-up   Associated symptoms include arthralgias and chest pain. Pertinent negatives include no chills or fever.     Review of Systems   Constitutional: Negative for chills and fever.   Respiratory: Positive for apnea and shortness of breath.         Does not use CPAP machine. States did not like it.    Cardiovascular: Positive for chest pain and leg swelling.        With asthma sx   Gastrointestinal: Positive for constipation and diarrhea.        + GERD   Endocrine: Positive for cold intolerance.   Genitourinary: Negative for difficulty urinating and dysuria.           Musculoskeletal: Positive for arthralgias and back pain.   Neurological: Positive for dizziness and light-headedness.   Psychiatric/Behavioral: Positive for dysphoric mood. The patient is not nervous/anxious.        Objective:     /62   Pulse 78   Ht 5' 4" (1.626 m)   Wt (!) 156.1 kg (344 lb 2.2 oz)   LMP 08/17/2019   BMI 59.07 kg/m²     Physical Exam   Constitutional: She is oriented to person, place, and time. She appears well-developed. No distress.   Morbidly obese     HENT:   Head: Normocephalic and atraumatic.   Eyes: Pupils are equal, round, and reactive to light. EOM are normal. No scleral icterus.   Neck: Normal range of motion. Neck supple.   Cardiovascular: Normal rate.   Pulmonary/Chest: Effort normal.   Musculoskeletal: Normal range of motion. She exhibits no edema.   Neurological: She is alert and oriented to person, place, and time. No cranial nerve deficit.   Skin: Skin is warm and dry. No erythema.   Psychiatric: She has a normal mood and affect. Her behavior is normal. Judgment normal.   Vitals " reviewed.      Assessment:       1. Type 2 diabetes mellitus with stage 3 chronic kidney disease, with long-term current use of insulin    2. Class 3 severe obesity due to excess calories with serious comorbidity and body mass index (BMI) of 50.0 to 59.9 in adult        Plan:         1. Type 2 diabetes mellitus with stage 3 chronic kidney disease, with long-term current use of insulin    - semaglutide (OZEMPIC) 1 mg/dose (2 mg/1.5 mL) PnIj; Inject 1 mg subq weekly.  Dispense: 3 mL; Refill: 3  Start Ozempic 1mg once a week.      Decrease portions as soon as you start Ozempic. Some nausea in the first 2 weeks is not unusual.     If you get pain across the upper abdomen and around to your back, please call the office.         2. Class 3 severe obesity due to excess calories with serious comorbidity and body mass index (BMI) of 50.0 to 59.9 in adult   Increase activity as cleared by podiatry/ortho.     No soda, sweet tea, juices or lemonade. All drinks should be 5 calories or less.      3 meals a day made up of the following:  Unlimited green vegetables, tomatoes, mushrooms, spaghetti squash, cauliflower, meat, poultry, seafood, eggs and hard cheeses.   Milk and plain yogurt  Dressings, seasonings, condiments, etc should have less than 2 g sugars.   Beans (1-1.5 cups) or nuts (1/4 cup) can have 1 x a day.   1-2 servings of citrus fruits, berries, pineapple or melon a day (1/2 cup)  Avoid fried foods    No sweets, grains, rice, pasta, potatoes, bread, corn, peas, oatmeal, grits, tortillas, crackers, chips    No soda, sweet tea, juices or lemonade. All drinks should be 5 calories or less.     Www.dietdoctor.com for recipes. Moderate carb intake    Holiday tips given

## 2019-11-25 ENCOUNTER — OFFICE VISIT (OUTPATIENT)
Dept: PODIATRY | Facility: CLINIC | Age: 49
End: 2019-11-25
Payer: MEDICARE

## 2019-11-25 VITALS
BODY MASS INDEX: 50.02 KG/M2 | HEART RATE: 57 BPM | WEIGHT: 293 LBS | HEIGHT: 64 IN | SYSTOLIC BLOOD PRESSURE: 153 MMHG | DIASTOLIC BLOOD PRESSURE: 94 MMHG

## 2019-11-25 DIAGNOSIS — M72.2 PLANTAR FASCIITIS: Primary | ICD-10-CM

## 2019-11-25 PROCEDURE — 99024 PR POST-OP FOLLOW-UP VISIT: ICD-10-PCS | Mod: HCNC,S$GLB,, | Performed by: PODIATRIST

## 2019-11-25 PROCEDURE — 99999 PR PBB SHADOW E&M-EST. PATIENT-LVL III: CPT | Mod: PBBFAC,HCNC,, | Performed by: PODIATRIST

## 2019-11-25 PROCEDURE — 99024 POSTOP FOLLOW-UP VISIT: CPT | Mod: HCNC,S$GLB,, | Performed by: PODIATRIST

## 2019-11-25 PROCEDURE — 99999 PR PBB SHADOW E&M-EST. PATIENT-LVL III: ICD-10-PCS | Mod: PBBFAC,HCNC,, | Performed by: PODIATRIST

## 2019-11-25 RX ORDER — OXYCODONE AND ACETAMINOPHEN 5; 325 MG/1; MG/1
1 TABLET ORAL EVERY 12 HOURS PRN
Qty: 40 TABLET | Refills: 0 | Status: SHIPPED | OUTPATIENT
Start: 2019-11-25 | End: 2020-04-30

## 2019-11-28 DIAGNOSIS — F33.3 SEVERE EPISODE OF RECURRENT MAJOR DEPRESSIVE DISORDER, WITH PSYCHOTIC FEATURES: ICD-10-CM

## 2019-11-28 DIAGNOSIS — J32.1 CHRONIC FRONTAL SINUSITIS: ICD-10-CM

## 2019-11-29 DIAGNOSIS — J32.1 CHRONIC FRONTAL SINUSITIS: ICD-10-CM

## 2019-11-29 RX ORDER — LEVOCETIRIZINE DIHYDROCHLORIDE 5 MG/1
TABLET, FILM COATED ORAL
Qty: 30 TABLET | Refills: 0 | Status: SHIPPED | OUTPATIENT
Start: 2019-11-29 | End: 2019-11-29 | Stop reason: SDUPTHER

## 2019-11-29 RX ORDER — LEVOCETIRIZINE DIHYDROCHLORIDE 5 MG/1
TABLET, FILM COATED ORAL
Qty: 90 TABLET | Refills: 0 | Status: SHIPPED | OUTPATIENT
Start: 2019-11-29 | End: 2020-02-26

## 2019-11-29 RX ORDER — QUETIAPINE FUMARATE 25 MG/1
TABLET, FILM COATED ORAL
Qty: 90 TABLET | Refills: 0 | Status: SHIPPED | OUTPATIENT
Start: 2019-11-29 | End: 2020-02-26

## 2019-11-30 NOTE — PROGRESS NOTES
Approximately 5 days status post plantar fasciectomy.  She is doing well with some discomfort however ambulating the boot.  She has been resting icing and elevating keeping the area clean and dry.  Incision healing well no signs of infection.  Follow-up in 1 week.

## 2019-12-02 ENCOUNTER — OFFICE VISIT (OUTPATIENT)
Dept: PODIATRY | Facility: CLINIC | Age: 49
End: 2019-12-02
Payer: MEDICARE

## 2019-12-02 VITALS
DIASTOLIC BLOOD PRESSURE: 98 MMHG | HEART RATE: 69 BPM | BODY MASS INDEX: 50.02 KG/M2 | HEIGHT: 64 IN | SYSTOLIC BLOOD PRESSURE: 151 MMHG | WEIGHT: 293 LBS

## 2019-12-02 DIAGNOSIS — M72.2 PLANTAR FASCIITIS: Primary | ICD-10-CM

## 2019-12-02 PROCEDURE — 99999 PR PBB SHADOW E&M-EST. PATIENT-LVL III: CPT | Mod: PBBFAC,HCNC,, | Performed by: PODIATRIST

## 2019-12-02 PROCEDURE — 99024 POSTOP FOLLOW-UP VISIT: CPT | Mod: HCNC,S$GLB,, | Performed by: PODIATRIST

## 2019-12-02 PROCEDURE — 99024 PR POST-OP FOLLOW-UP VISIT: ICD-10-PCS | Mod: HCNC,S$GLB,, | Performed by: PODIATRIST

## 2019-12-02 PROCEDURE — 99999 PR PBB SHADOW E&M-EST. PATIENT-LVL III: ICD-10-PCS | Mod: PBBFAC,HCNC,, | Performed by: PODIATRIST

## 2019-12-04 NOTE — PROGRESS NOTES
Approximately 2 weeks status post plantar fasciectomy.  She is doing well with some discomfort however ambulating the boot.  She has been resting icing and elevating keeping the area clean and dry.  Incision healing well no signs of infection.  Follow-up in 1 week.

## 2019-12-09 ENCOUNTER — OFFICE VISIT (OUTPATIENT)
Dept: PODIATRY | Facility: CLINIC | Age: 49
End: 2019-12-09
Payer: MEDICARE

## 2019-12-09 VITALS
BODY MASS INDEX: 50.02 KG/M2 | WEIGHT: 293 LBS | HEIGHT: 64 IN | SYSTOLIC BLOOD PRESSURE: 134 MMHG | HEART RATE: 60 BPM | DIASTOLIC BLOOD PRESSURE: 85 MMHG

## 2019-12-09 DIAGNOSIS — M72.2 PLANTAR FASCIITIS: Primary | ICD-10-CM

## 2019-12-09 PROCEDURE — 99999 PR PBB SHADOW E&M-EST. PATIENT-LVL III: ICD-10-PCS | Mod: PBBFAC,HCNC,, | Performed by: PODIATRIST

## 2019-12-09 PROCEDURE — 99024 POSTOP FOLLOW-UP VISIT: CPT | Mod: HCNC,S$GLB,, | Performed by: PODIATRIST

## 2019-12-09 PROCEDURE — 99999 PR PBB SHADOW E&M-EST. PATIENT-LVL III: CPT | Mod: PBBFAC,HCNC,, | Performed by: PODIATRIST

## 2019-12-09 PROCEDURE — 99024 PR POST-OP FOLLOW-UP VISIT: ICD-10-PCS | Mod: HCNC,S$GLB,, | Performed by: PODIATRIST

## 2019-12-10 NOTE — PROGRESS NOTES
Approximately 3 weeks status post plantar fasciectomy.  She is doing well with some discomfort however ambulating the boot.  She has been resting icing and elevating keeping the area clean and dry.  Incision healing well no signs of infection.  Follow-up in 1 week for suture removal.

## 2019-12-16 ENCOUNTER — TELEPHONE (OUTPATIENT)
Dept: GASTROENTEROLOGY | Facility: CLINIC | Age: 49
End: 2019-12-16

## 2019-12-16 ENCOUNTER — PATIENT MESSAGE (OUTPATIENT)
Dept: GASTROENTEROLOGY | Facility: CLINIC | Age: 49
End: 2019-12-16

## 2019-12-16 ENCOUNTER — OFFICE VISIT (OUTPATIENT)
Dept: GASTROENTEROLOGY | Facility: CLINIC | Age: 49
End: 2019-12-16
Payer: MEDICARE

## 2019-12-16 VITALS
BODY MASS INDEX: 50.02 KG/M2 | HEART RATE: 67 BPM | HEIGHT: 64 IN | SYSTOLIC BLOOD PRESSURE: 140 MMHG | DIASTOLIC BLOOD PRESSURE: 94 MMHG | WEIGHT: 293 LBS

## 2019-12-16 DIAGNOSIS — R11.2 NAUSEA AND VOMITING, INTRACTABILITY OF VOMITING NOT SPECIFIED, UNSPECIFIED VOMITING TYPE: ICD-10-CM

## 2019-12-16 DIAGNOSIS — K21.9 GASTROESOPHAGEAL REFLUX DISEASE WITHOUT ESOPHAGITIS: ICD-10-CM

## 2019-12-16 DIAGNOSIS — K21.9 GASTROESOPHAGEAL REFLUX DISEASE, ESOPHAGITIS PRESENCE NOT SPECIFIED: Primary | ICD-10-CM

## 2019-12-16 DIAGNOSIS — A04.8 H. PYLORI INFECTION: ICD-10-CM

## 2019-12-16 DIAGNOSIS — D64.9 ANEMIA, UNSPECIFIED TYPE: ICD-10-CM

## 2019-12-16 DIAGNOSIS — R07.89 NON-CARDIAC CHEST PAIN: ICD-10-CM

## 2019-12-16 DIAGNOSIS — R13.10 DYSPHAGIA, UNSPECIFIED TYPE: ICD-10-CM

## 2019-12-16 DIAGNOSIS — R10.9 ABDOMINAL PAIN, UNSPECIFIED ABDOMINAL LOCATION: ICD-10-CM

## 2019-12-16 DIAGNOSIS — K59.00 CONSTIPATION, UNSPECIFIED CONSTIPATION TYPE: ICD-10-CM

## 2019-12-16 DIAGNOSIS — R14.0 BLOATING: ICD-10-CM

## 2019-12-16 DIAGNOSIS — G47.33 OSA ON CPAP: ICD-10-CM

## 2019-12-16 PROCEDURE — 99215 PR OFFICE/OUTPT VISIT, EST, LEVL V, 40-54 MIN: ICD-10-PCS | Mod: HCNC,S$GLB,, | Performed by: INTERNAL MEDICINE

## 2019-12-16 PROCEDURE — 3080F PR MOST RECENT DIASTOLIC BLOOD PRESSURE >= 90 MM HG: ICD-10-PCS | Mod: HCNC,CPTII,S$GLB, | Performed by: INTERNAL MEDICINE

## 2019-12-16 PROCEDURE — 99499 RISK ADDL DX/OHS AUDIT: ICD-10-PCS | Mod: HCNC,S$GLB,, | Performed by: INTERNAL MEDICINE

## 2019-12-16 PROCEDURE — 99999 PR PBB SHADOW E&M-EST. PATIENT-LVL V: CPT | Mod: PBBFAC,HCNC,, | Performed by: INTERNAL MEDICINE

## 2019-12-16 PROCEDURE — 3008F BODY MASS INDEX DOCD: CPT | Mod: HCNC,CPTII,S$GLB, | Performed by: INTERNAL MEDICINE

## 2019-12-16 PROCEDURE — 99999 PR PBB SHADOW E&M-EST. PATIENT-LVL V: ICD-10-PCS | Mod: PBBFAC,HCNC,, | Performed by: INTERNAL MEDICINE

## 2019-12-16 PROCEDURE — 3077F PR MOST RECENT SYSTOLIC BLOOD PRESSURE >= 140 MM HG: ICD-10-PCS | Mod: HCNC,CPTII,S$GLB, | Performed by: INTERNAL MEDICINE

## 2019-12-16 PROCEDURE — 99499 UNLISTED E&M SERVICE: CPT | Mod: HCNC,S$GLB,, | Performed by: INTERNAL MEDICINE

## 2019-12-16 PROCEDURE — 3077F SYST BP >= 140 MM HG: CPT | Mod: HCNC,CPTII,S$GLB, | Performed by: INTERNAL MEDICINE

## 2019-12-16 PROCEDURE — 3008F PR BODY MASS INDEX (BMI) DOCUMENTED: ICD-10-PCS | Mod: HCNC,CPTII,S$GLB, | Performed by: INTERNAL MEDICINE

## 2019-12-16 PROCEDURE — 3080F DIAST BP >= 90 MM HG: CPT | Mod: HCNC,CPTII,S$GLB, | Performed by: INTERNAL MEDICINE

## 2019-12-16 PROCEDURE — 99215 OFFICE O/P EST HI 40 MIN: CPT | Mod: HCNC,S$GLB,, | Performed by: INTERNAL MEDICINE

## 2019-12-16 NOTE — PROGRESS NOTES
[facility] Consultation Note    Reason for Consult:    Chief Complaint   Patient presents with    Nausea     vomiting    Gas     bloating    Constipation    Weight Loss         PCP:   Carlyle Gordillo       Referring MD:  Flako Trejo Md  [address] [place] [address]  [address]  [facility], [place], operated by [facility]  [place] [address]    HPI:  Yanni Kaiser is a 49 y.o. female with a PMH of anemia, arthritis, back pain, plantar fascitis, asthma,  anxiety, MDD, DM2 , HTN, migraines, seizures, SHARATH referred to motility clinic for second opinion regarding the following problems:    GERD. Still having some reflux.   Retrosternal pyrosis:yes  Regurgitation:yes  Belching:yes bothersome and painful  Sometimes occurs in sleep   Frequency: 2-3 per month on meds   Protonix 40 mg BID   Has not tried gaviscon w alginate as previously advised  Uses tums prn   Sleeps w head of the bed elevated   Does not eat prior to sleep      Chest pain. Not frequent at this point.  Occasionally w swallowing.   Frequency: few per week   Improvement with belching  Cardiology jameson negative.  Stress test negative. Cards does not think this is cardiac     Dysphagia.  Still w difficulty swallowing.   Problems with solids:mostly  Location: upper esophagus  Frequency:  Few per month, not really gettign stuck that often anymore    Unable to tolerate manometry catheter after it was placed endoscopically     Nausea.  Few per week   Early satiety. daily  No vomiting    Diet: Following GP diet - still needs to work on it. Seen by dietitian     MEDS:   zofran 4mg -  almost  daily prn  Did not try FDguard as previously advised     Gastroparesis diagnosed:  Possibly in 2016 with smartpill    Number of GP related hospitalization : none  Number of GP related ED visit: none    Curenlty on following medications that may affect gastric emptying:   Narcotics (morphine, oxycodone, tapentadol, less with tramadol): no longer  "taking percocet    DM 2. Last A1C increased 5.8  Dx 10 year ago.   BS running up to 90s.   Previously followed by endocrinologist.   Taking metformin 1,000 mg BID  Started ozempic for weight loss in May.  Not sure if this made symptoms worse, but not gettign much better     Abdominal pain. Better  LUQ pain previously resolved after course of abx for suspected diverticulitis.  No longer using meloxicam 15 mg HS   Has not tried FDgard as previously advised    Gas and bloating. still occurs   No improvement with  No art sugars, dairy, probiotic    Constipation. Improved.  No longer gets diarrhea   [place] 4 or 3, previously type 1  BM 1 per week   Still feels that is not evacuating competely   MEDS:   No longer taking Linzess 290 mcg bc causes diarrhea.  Takes it prn only    No longer taking miralax bc of diarrhea     Weight fluctuates. Protein low. Is not taking protein shakes TID as advised d/t "tasted bad".    H/o h. Pylori in 2016. Unsure if treated in 2016. Hp stool + 2018. Treated w doxy, flagyl, pepto and ppi x 14 days recently. Hp + 2019.  Completed the entire course.    Anemia. No over bleeding.  No vaginal bleeding. EGD/colon negative.    Diverticulitis on CT in 11/2018. Treated with abx. Subsequent colonoscopy okay. Maybe this made sx better     Fatty liver.     Family history of colon cancer. Mother dx in 50s, MGM dx ?    Anxiety. Depression.   Some improvement with seroquel 25 mg HS, also takes effexor 75 mg HS (for HAs).   Followed by PCP. Has seen psych in the past.     Insomnia. Not on meds. Diagnosed with SHARATH. Not on cpap/does not like it.     Migraines. Seizures. Some improvement with tompiramate 300 mg  BID, meloxicam 15 mg daily, imitrex 50 mg PRN, also taking effexor 75 mg HS, and aimovig once monthly inj. Per neruology.    Plantar fascitis. No longer taking percocet 5-325 mg.  Had foot surgery w imrpovemetn. Followed by pain management and podietry    Joint pain. Joint swelling. Knees. Hands. " Back.  Seen by rheumatology w/ negative work up.    Denies diarrhea, BRBPR, melena    Total visit time was 40 minutes, more than 50% of which was spent in face-to-face counseling with patient regarding symptoms, diagnostic results, prognosis, risks and benefits of treatment options, instructions for management, importance of compliance with chosen treatment options, risk factor reduction, stress reduction, coping strategies.      Previous Studies:   EGD 11/5/2019: - Normal esophagus (P/D bx-). Z-line regular, 40 cm from the incisors. Normal gastroesophageal junction. Normal stomach.  Normal examined duodenum. FLIP imaging performed, consistent with normal esophageal contractility.  Esophagram 06/12/2019:  Mild esophageal dysmotility.  13 mm barium tablet passed into the stomach after multiple sips of water and mildly delayed transit the GEJ.  Colonoscopy 1/14/19: EPTC. Hemorrhoids. 3 mm cecal polyp (TA). Med lipoma 14 mm in AC. Recto sigmoid and cecal tics Two 3-5 mm SC polyps (hp). Multiple small polyps in rectum and recto sigmoid colon (hp). Rpt 5 yrs.   EGD 1/14/19: nl esophagus (-). Clear gastric fluid. Erosive gastropathy (-). Nl duodenum (-).   Ct a/p 11/6/18: Slight pericolonic fat stranding at the junction of the sigmoid and descending colon concerning for mild uncomplicated diverticulitis or epiploic appendagitis.Other incidental findings as above including cholecystectomy, mild focal fatty infiltration liver, tiny splenule, and osseous degenerative changes.  EGD 12/9/16: nl esophagus. Gastric erythema and granularity (gastritis + hpylori). Nl duodenum.   SmartPill 10/5/16: GET delayed 5 hr 50 min, sbtt nl 5 hr 8min, CTT nl 12 hr 13 min, SLBTT nl 19 hr 22 min, WGTT nl 25 hr 13 min  CT abd/pelvis 4/29/16: unremarkable  * colon 2016:?  Abd us 3/11/08: technically limited study. fatty liver. s/p nichole  Abd us 3/5/08: GB stones w/ + bullards sign.     Labs:  10/10/18: hgb low 11.6, RDW high 16.1, BMP  unremarkable  18 : vit d low 12  18: TSH nl  18: hgb low 11.3, RDW high 16.8, cl high 113, albumin low 3.1  CCP ab IgG-  RF-  Taylor-  Sjogrens taylor -  Hep A ab IgG+  Hep c ab-  Hep b s ab-  Hep b c ab-  Hep b sag-  Quant gold tb -    Relevant Surgeries:  Cholecystectomy (3/7/2008)    * per pt report    ROS:  ROS   Constitutional: no fever, + chills, +night sweats, no weight loss  ENT: + congestion, +  rhinorrhea, + chronic sinus problems  CV: +Chest pain, no palpitations  Pulm: + cough, + shortness of breath  Ophtho: + blurry vision, no eye redness  GI: see HPI  Derm: No rash  Heme: No lymphadenopathy, + bruising  MSK: + joint pain, + joint swelling, no Raynauds  : + dysuria, + frequent urination, no blood in urine  Endo: + hot or cold intolerance  Neuro: no dizziness, no syncope, no seizure  Psych: + anxiety, + depression        Medical History:   Past Medical History:   Diagnosis Date    Allergy     Anemia     Asthma     Back pain     Chronic bronchitis     Cigarette smoker     Depression     Diabetes with neurologic complications     DUB (dysfunctional uterine bleeding) 10/16/2018    GERD (gastroesophageal reflux disease)     High cholesterol     Hyperprolactinemia     Hypertension     Obese body habitus     Pseudotumor cerebri     Renal manifestation of secondary diabetes mellitus     Respiratory failure     Seizures     Simple endometrial hyperplasia 2018    Sleep apnea     Smoker     Tobacco dependence         Surgical History:   Past Surgical History:   Procedure Laterality Date    BREAST CYST ASPIRATION       SECTION      X 1    CHOLECYSTECTOMY      COLONOSCOPY      COLONOSCOPY N/A 2019    Procedure: COLONOSCOPY;  Surgeon: Jagruti Sweeney MD;  Location: [facility] ([address] [place] [address]);  Service: Endoscopy;  Laterality: N/A;  BMI is 63/gastroparesis/3 days full liquid diet 1 day clear liquid see telephone encounter by Ciara limon    ESOPHAGEAL MANOMETRY WITH  MEASUREMENT OF IMPEDANCE N/A 11/5/2019    Procedure: MANOMETRY-ESOPHAGEAL-WITH IMPEDANCE;  Surgeon: Jagruti Sweeney MD;  Location: [facility] (2ND FLR);  Service: Endoscopy;  Laterality: N/A;  Hold Narcotics x 1 days   Hold TCA x 1 days  Propofol only. No fentanyl or benzodiazepine during sedation. If additional sedation needed, discuss with Dr. Sweeney.  10/29 - pt confirmed appt    ESOPHAGOGASTRODUODENOSCOPY N/A 1/14/2019    Procedure: EGD (ESOPHAGOGASTRODUODENOSCOPY);  Surgeon: Jagruti Sweeney MD;  Location: [facility] (2ND FLR);  Service: Endoscopy;  Laterality: N/A;  BMI is 63/gastroparesis/3 days full liquid diet 1 day clear liquid see telephone encounter by Ciara limon    ESOPHAGOGASTRODUODENOSCOPY N/A 11/5/2019    Procedure: ESOPHAGOGASTRODUODENOSCOPY (EGD);  Surgeon: Jagruti Sweeney MD;  [facility]  Service: Endoscopy;  Laterality: N/A;  EGD with EndoFlip /+/- Botox  2nd floor for gastroparesis/BMI 63 **367lbs**  Bariatric Stretcher needed  Manometry probe to be placed endoscopically during EGD procedure  Due to change in protocol, Full liquid diet for 3 days/ 1 day of clear liquids  Hi    FOOT FRACTURE SURGERY Left     HYSTEROSCOPY WITH DILATION AND CURETTAGE OF UTERUS N/A 10/16/2018    KJB    PLANTAR FASCIOTOMY Left 11/18/2019    Procedure: FASCIOTOMY, PLANTAR;  Surgeon: Valentino Orourke DPM;  Location: [facility] OR [address] [place] [address];  Service: Podiatry;  Laterality: Left;    RETINAL LASER PROCEDURE Left     SINUS SURGERY      UPPER GASTROINTESTINAL ENDOSCOPY          Family History:   Family History   Problem Relation Age of Onset    Hypertension Mother     Diabetes Mother     Colon cancer Mother 50    Colon polyps Mother     Heart disease Father     COPD Father     Heart attack Father     Hypertension Father     Ulcers Father     Cancer Maternal Grandmother     Breast cancer Maternal Grandmother     Colon cancer Maternal Grandmother     Colon polyps Maternal  Grandmother     No Known Problems Paternal Grandmother     No Known Problems Brother     No Known Problems Maternal Aunt     No Known Problems Maternal Uncle     No Known Problems Paternal Aunt     No Known Problems Paternal Uncle     No Known Problems Maternal Grandfather     No Known Problems Paternal Grandfather     Celiac disease Neg Hx     Cirrhosis Neg Hx     Crohn's disease Neg Hx     Cystic fibrosis Neg Hx     Esophageal cancer Neg Hx     Hemochromatosis Neg Hx     Inflammatory bowel disease Neg Hx     Irritable bowel syndrome Neg Hx     Liver cancer Neg Hx     Liver disease Neg Hx     Rectal cancer Neg Hx     Stomach cancer Neg Hx     Ulcerative colitis Neg Hx     Jonnie's disease Neg Hx     Tuberculosis Neg Hx     Lymphoma Neg Hx     Scleroderma Neg Hx     Rheum arthritis Neg Hx     Melanoma Neg Hx     Multiple sclerosis Neg Hx     Psoriasis Neg Hx     Lupus Neg Hx     Skin cancer Neg Hx     Amblyopia Neg Hx     Blindness Neg Hx     Cataracts Neg Hx     Glaucoma Neg Hx     Macular degeneration Neg Hx     Retinal detachment Neg Hx     Strabismus Neg Hx     Stroke Neg Hx     Thyroid disease Neg Hx         Social History:   Social History     Socioeconomic History    Marital status:      Spouse name: Not on file    Number of children: Not on file    Years of education: Not on file    Highest education level: Not on file   Occupational History    Not on file   Social Needs    Financial resource strain: Not on file    Food insecurity:     Worry: Not on file     Inability: Not on file    Transportation needs:     Medical: Not on file     Non-medical: Not on file   Tobacco Use    Smoking status: Current Every Day Smoker     Packs/day: 1.00     Years: 27.00     Pack years: 27.00     Types: Cigarettes     Start date: 1/1/1992    Smokeless tobacco: Never Used    Tobacco comment: 1/2 a pack if her days are good   Substance and Sexual Activity    Alcohol use:  No     Alcohol/week: 0.0 standard drinks    Drug use: No    Sexual activity: Yes     Partners: Male     Birth control/protection: None   Lifestyle    Physical activity:     Days per week: Not on file     Minutes per session: Not on file    Stress: Not on file   Relationships    Social connections:     Talks on phone: Not on file     Gets together: Not on file     Attends Hindu service: Not on file     Active member of club or organization: Not on file     Attends meetings of clubs or organizations: Not on file     Relationship status: Not on file   Other Topics Concern    Not on file   Social History Narrative    Not on file        Review of patient's allergies indicates:  No Known Allergies    Current Outpatient Medications   Medication Sig Dispense Refill    acetaZOLAMIDE (DIAMOX) 500 mg CpSR Take 1 capsule (500 mg total) by mouth 2 (two) times daily. 360 capsule 1    albuterol (PROVENTIL/VENTOLIN HFA) 90 mcg/actuation inhaler Inhale 2 puffs into the lungs every 6 (six) hours as needed. Rescue 54 g 0    atorvastatin (LIPITOR) 40 MG tablet Take 1 tablet (40 mg total) by mouth once daily. 90 tablet 3    blood sugar diagnostic Strp 1 each by Misc.(Non-Drug; Combo Route) route 4 (four) times daily before meals and nightly. ACCU CHEK ELVIA PLUS METER 200 each 3    blood-glucose meter (ACCU-CHEK ELVIA PLUS METER) [facility] TEST FOUR TIMES DAILY BEFORE MEALS AND EVERY NIGHT 90 each 1    budesonide-formoterol 160-4.5 mcg (SYMBICORT) 160-4.5 mcg/actuation HFAA Inhale 2 puffs into the lungs every 12 (twelve) hours. Controller 1 Inhaler 5    diclofenac sodium (VOLTAREN) 1 % Gel Apply 2 g topically once daily. 100 g 3    fluticasone propionate (FLONASE) 50 mcg/actuation nasal spray 1 spray (50 mcg total) by Each Nare route once daily. 15.8 mL 2    lancets (ACCU-CHEK SOFTCLIX LANCETS) Misc 1 Device by Misc.(Non-Drug; Combo Route) route 4 (four) times daily. 200 each 3    levocetirizine (XYZAL) 5 MG tablet TAKE  1 TABLET(5 MG) BY MOUTH EVERY EVENING 90 tablet 0    losartan (COZAAR) 100 MG tablet Take 1 tablet (100 mg total) by mouth once daily. 90 tablet 0    meloxicam (MOBIC) 15 MG tablet TAKE 1 TABLET(15 MG) BY MOUTH DAILY AS NEEDED FOR HEADACHE 90 tablet 1    metFORMIN (GLUCOPHAGE-XR) 500 MG 24 hr tablet Take 2 tablets (1,000 mg total) by mouth 2 (two) times daily with meals. 360 tablet 0    montelukast (SINGULAIR) 10 mg tablet TAKE 1 TABLET(10 MG) BY MOUTH EVERY EVENING 30 tablet 0    ondansetron (ZOFRAN) 8 MG tablet Take 1 tablet (8 mg total) by mouth every 8 (eight) hours as needed for Nausea. 90 tablet 5    oxyCODONE-acetaminophen (PERCOCET)  mg per tablet TAKE 1 TABLET BY MOUTH EVERY 8 HOURS AS NEEDED. MAY CAUSE DROWSINESS  0    oxyCODONE-acetaminophen (PERCOCET) 5-325 mg per tablet Take 1 tablet by mouth every 12 (twelve) hours as needed for Pain. 40 tablet 0    pantoprazole (PROTONIX) 40 MG tablet Take 40 mg by mouth once daily.   3    QUEtiapine (SEROQUEL) 25 MG Tab TAKE 1 TABLET(25 MG) BY MOUTH EVERY EVENING 90 tablet 0    semaglutide (OZEMPIC) 1 mg/dose (2 mg/1.5 mL) PnIj Inject 1 mg subq weekly. 3 mL 3    sumatriptan (IMITREX) 50 MG tablet Take 1 tab at onset of headaches.  If no improvement in 2 hours, take another.  Do not take more than 2 tabs in 24 hours. 9 tablet 5    topiramate (TOPAMAX) 100 MG tablet TAKE 3 TABLETS BY MOUTH TWICE DAILY 180 tablet 11    traMADol (ULTRAM-ER) 100 MG Tb24 Take 1 tablet (100 mg total) by mouth daily as needed. 14 tablet 0    venlafaxine (EFFEXOR-XR) 75 MG 24 hr capsule TAKE 1 CAPSULE(75 MG) BY MOUTH EVERY EVENING 90 capsule 3    albuterol-ipratropium (DUO-NEB) 2.5 mg-0.5 mg/3 mL nebulizer solution Take 3 mLs by nebulization every 6 (six) hours as needed for Wheezing or Shortness of Breath. Rescue 100 mL 3    prucalopride 2 mg Tab Take one tablet by mouth once daily. 30 tablet 6    triamcinolone acetonide 0.1% (KENALOG) 0.1 % ointment Apply topically 2  "(two) times daily. for 7 days 30 g 0     No current facility-administered medications for this visit.         Objective Findings:  Vital Signs:  BP (!) 140/94   Pulse 67   Ht 5' 4" (1.626 m)   Wt (!) 157.7 kg (347 lb 10.7 oz)   LMP 08/17/2019   BMI 59.68 kg/m²   Body mass index is 59.68 kg/m².    Physical Exam:  General appearance: alert, cooperative, no distress  HENT: Normocephalic, atraumatic, neck symmetrical, no nasal discharge  Eyes: conjunctivae/corneas clear, PERRL, EOM's intact  Lungs: clear to auscultation bilaterally, no dullness to percussion bilaterally  Heart: regular rate and rhythm without rub; no displacement of the PMI  Abdomen: soft, non-tender; bowel sounds normoactive; no organomegaly  Extremities: extremities symmetric; no clubbing, cyanosis, or edema  Integument: Skin color, texture, turgor normal; no rashes; hair distrubution normal  Neurologic: Alert and oriented X 3, normal strength, normal coordination and gait  Psychiatric: no pressured speech; normal affect; no evidence of impaired cognition    Labs:  Lab Results   Component Value Date    WBC 4.01 11/01/2019    HGB 11.8 (L) 11/01/2019    HCT 37.2 11/01/2019    MCV 91 11/01/2019     11/01/2019     Lab Results   Component Value Date    FERRITIN 9 (L) 05/17/2019     Lab Results   Component Value Date     11/01/2019    K 3.6 11/01/2019     (H) 11/01/2019    CO2 22 (L) 11/01/2019    GLU 92 11/01/2019    BUN 14 11/01/2019    CREATININE 1.4 11/01/2019    CALCIUM 8.7 11/01/2019    PROT 6.6 11/01/2019    ALBUMIN 3.5 11/01/2019    BILITOT 0.3 11/01/2019    ALKPHOS 61 11/01/2019    AST 10 11/01/2019    ALT 12 11/01/2019     Lab Results   Component Value Date    TSH 1.229 11/05/2018     Lab Results   Component Value Date    SEDRATE 55 (H) 08/08/2018     Lab Results   Component Value Date    CRP 11.4 (H) 08/08/2018     Lab Results   Component Value Date    HGBA1C 5.6 11/01/2019           Assessment and Plan:  Yanni Schwartz " Job is a 49 y.o. female with a PMH of anemia, arthritis, back pain, plantar fascitis, asthma,  anxiety, MDD, DM2 , HTN, migraines, seizures, SHARATH referred to motility clinic for second opinion regarding the following problems:    GERD. Refractory  Some improvement with prilosec OTC 40 mg once daily in the past.  -pantoprazole 40 mg to BID.   -Tums prn   -EGD w BRAVO off PPI +/- prophylactic dilation     Chest pain, sometimes w swallowing. Negative cardiac work up.  EGD w EndoFlip and esophagogram negative   Improvement with belching  Unable to comeplete manometry  -Cont PPI BID    Dysphagia.  EGD w EndoFlip and esophagogram negative. Esophagram with mild esophageal dysmotility; 13 mm barium tablet passed into the stomach after multiple sips of water and mildly delayed transit the GEJ.  Unable to comeplete manometry  -Cont PPI BID  -EGD w BRAVO +/- dilation     Nausea.  Early satiety. No vomiting. DM Gastroparesis (smartPill in 2016 with dalayed  gastric emptying).  EGD with gastric fluid and pill in stomach.   No longer on percocet  Unable to tolerate reglan due to twitching.   Some improvement with phenergan IV  -Cont zofran 8 mg TID prn   -Start FDgard prn as previously adivsed  -Cont  gastroparesis diet. Fu w GI dietitian  -Start prucalopride       -Discuss   -Will consider adding neuromodulator    DM type 2. Diagnosed 10 year ago. BS running 90s. Last hba1c 5.8  On metformin 1000 mg BID.   Previously followed by endocrinologist, currently followed by PCP.   -Discuss ozempic use w PCP/endocrinology - this may be havign negative impact on Gi symptoms/motility      Abdominal pain.    Improved - epigastric and LUQ pain   Previous CT with possible diverticulitis, txed w abx - LLQ pain resolved  On seroquel, effexor  No longer on percocet and melaxoicam  -start FDgard prn as previously adivsed    Gas and bloating.   Avoids artificial sugars.  No improvement with lactose elimination  No longer on  probiotic.  Previously treated with Augmentin tid x 10days to treat possible diverticulitis.  -Unclear if comeplted sibo breath test    Constipation.  Normal colon transit. .  No improvement with miralax once daily  No improvement with  Amitiza ?dose? BID  Insurance does not cover trulance.  Unable to tolerate linzess 290 mcg due to diarrhea   Unable to tolerate miralax due to diarrhea   -Start prucalopride   -Will consider MRI defecography  -Will consider ARM    Possible prolapse. Pt denies at this time   -Will consider MRI Defecogram     Weight fluctuates. Pre albumin/albumin low. Trying to loose weight   Unable to tolerate high protein shakes   -Fu w GI dietitian  -Start MVA daily     H. Pylori in 2016. Unsure if treated. EGD negative.    2/12/2019 stools + - doxy/flagyl/pepto/ppix 14 days   10/2019 stool + - Amox, clarith, PPI   -Check stools for h pylori off PPI    Anemia. Low hgb. Labs with persistent, mild KASH. EGD/colon negative  -Start iron BID  -Will check at next visit and consider VCE w ref GI     CT w possible diverticulitis vs epiploic appendagitis. Treated with Augmentin tid x 10days.  Colon negative.  LUQ pain resolved after treatment.     Fatty liver.   -Defer to ref GI     Family history of colon cancer. Mother dx in 50s, MGM ?dx ?. Colonoscopy with 3 mm cecal TA and hyperplastic polyps.   Repeat in 5 years (2024).    Anxiety. Depression.   Some improvement with seroquel 25 mg HS, also takes effexor 75 mg HS (for HAs).   Has seen psych in the past. Followed by PCP.    Insomnia. Not on meds. Diagnosed with SHARATH. Not on cpap/does not like it.   -Again disucussed the importance of sleep in functional GI disorders.  Will consider seeing a sleep specialist and CBT for sleep  -Fu w sleep - referral placed    Migraines. Seizures.   Some improvement with tompiramate 300 mg  BID, meloxicam 15 mg daily, imitrex 50 mg PRN, also taking effexor 75 mg HS, and aimovig once monthly Per neruology.    Plantar  fascitis. Had foot surgery w improvement  No longer taking percocet  -Followed by pain management and podiatry    Joint pain. Joint swelling. Knees. Hands. Back. ESR/CRP high  Seen by rheumatology w/ negative work up.    Follow up in about 3 months (around 3/16/2020) for Ciara Pinto NP, Dr. Sweeney in 6 months .    1. Gastroesophageal reflux disease, esophagitis presence not specified    2. SHARATH on CPAP    3. H. pylori infection    4. Constipation, unspecified constipation type    5. Dysphagia, unspecified type    6. Non-cardiac chest pain    7. Gastroesophageal reflux disease without esophagitis    8. Nausea and vomiting, intractability of vomiting not specified, unspecified vomiting type    9. Bloating    10. Abdominal pain, unspecified abdominal location    11. Anemia, unspecified type          Order summary:  Orders Placed This Encounter    H. pylori antigen, stool    Ambulatory referral to Sleep Disorders    prucalopride 2 mg Tab    Case request GI: ESOPHAGOGASTRODUODENOSCOPY (EGD) with BRAVO         Thank you so much for allowing me to participate in the care of Efrainkayla Sweeney MD

## 2019-12-16 NOTE — TELEPHONE ENCOUNTER
----- Message from Lavinia Harden sent at 12/16/2019  9:53 AM CST -----  Contact: pharmacy  [phone]-please call pharmacy on above patient at number in message have question about medication thanks.

## 2019-12-16 NOTE — TELEPHONE ENCOUNTER
Christelle from pharmacy called to let provider know that motegrity is non formulary and insurance won't cover.

## 2019-12-16 NOTE — PATIENT INSTRUCTIONS
-Complete EGD with BRAVO off protonix  For one week    -See GI dietitian to work on gastroparesis diet    -Try FDguard 1 tablet three times per day as needed for abdominal pain, nausea, satiety, vomiting.  You can purchase this over the counter. Look for coupons on line.   -Discuss ozempic with your PCP or Endocrinologist. this medication usually makes gastroparesis worse   -Check H. Pylori stool test off PPI   1. Off all Antibiotics for 4 (Four) weeks before stool collection.      2. Off all Proton Pump Inhibitors medications for 2 (Two) weeks before collecting stool for H. Pylori Antigen:  :  Nexium (esomeprazole)  Prilosec (omeprazole)   Protonix (pantoprazole)  Prevacid (lansoprazole)  Aciphex (rabeprazole)  Dexilant (dexlansoprazole)    Zegerid     3. Off all H2 blockers medications for 2 (Two) weeks before collecting stool for H. Pylori Antigen:    Zantac (ranitidine)  Tagamet (cimetadine)  Axid (nizatidine)   Pepcid (famotidine)    4. Off Pepto-Bismol for 4 (four) weeks prior to collecting stool for H. Pylori Antigen.    -I am referring you to Dr. Johnson to make sure you do not have a sleep disorder and to help improve your sleep hygiene   - Stop linzess  .-Start prucalopride 2mg daily (motegrity) for constipation.    -The most common side effects of prucalopride/motegrity include: headache, stomach area (abdominal) pain or bloating, nausea, diarrhea, dizziness, vomiting, gas and fatigue. If reported, diarrhea or headache typically occurred within the first week of treatment and resolved within a few days of continued use.   This medication may also have impact on your mood.  Stop taking Motegrity right away and tell your healthcare provider immediately if your depression gets worse, you feel sad, hopeless or begin to have thoughts of suicide, thoughts of hurting yourself or have tried to hurt yourself.   -If you need help with prescription coverage call  [phone] or look for information at  [email or web address]  Your labs show low iron level anemia. Please start over the counter iron 325 (65 Fe) MG twice per day   -Take women multivitamin daily

## 2019-12-16 NOTE — TELEPHONE ENCOUNTER
MOTILITY CLINIC PROCEDURE ORDERS    CLEARANCE FOR PROCEDURES:  No needed     PROCEDURES  EGD with BRAVO 48 hours     FLOOR:  2nd Floor    Reason for 2nd Floor:   Gastroparesis     PREP  Full liquid diet 3 days     MEDICATIONS  OFF PPI/H2 Blocker     Motility Studies (esophageal manometry/anorectal manometry)  Hold Narcotics x 1 days if able   Hold TCA x 1 days if able  Propofol only during sedation.  Discuss with Dr. Sweeney if additional sedation needed.

## 2019-12-19 ENCOUNTER — TELEPHONE (OUTPATIENT)
Dept: PHARMACY | Facility: CLINIC | Age: 49
End: 2019-12-19

## 2019-12-19 DIAGNOSIS — K59.00 CONSTIPATION, UNSPECIFIED CONSTIPATION TYPE: Primary | ICD-10-CM

## 2019-12-19 DIAGNOSIS — K59.00 CONSTIPATION, UNSPECIFIED CONSTIPATION TYPE: ICD-10-CM

## 2019-12-19 RX ORDER — LACTULOSE 10 G/15ML
10 SOLUTION ORAL; RECTAL 3 TIMES DAILY PRN
Qty: 473 ML | Refills: 3 | Status: SHIPPED | OUTPATIENT
Start: 2019-12-19 | End: 2019-12-19

## 2019-12-19 RX ORDER — LACTULOSE 10 G/15ML
SOLUTION ORAL; RECTAL
Qty: 473 ML | Refills: 6 | Status: SHIPPED | OUTPATIENT
Start: 2019-12-19 | End: 2020-07-14

## 2019-12-19 NOTE — TELEPHONE ENCOUNTER
Please let pt know that insurance did not approve prucalopride.  They want her to try lactulose first.  She can try lactulose daily and increase it up to TID as needed for constipationShe can also look into help with prescription coverage call  [phone] or look for information at [email or web address]

## 2019-12-20 ENCOUNTER — PATIENT MESSAGE (OUTPATIENT)
Dept: GASTROENTEROLOGY | Facility: CLINIC | Age: 49
End: 2019-12-20

## 2019-12-24 ENCOUNTER — OFFICE VISIT (OUTPATIENT)
Dept: PODIATRY | Facility: CLINIC | Age: 49
End: 2019-12-24
Payer: MEDICARE

## 2019-12-24 VITALS
SYSTOLIC BLOOD PRESSURE: 143 MMHG | HEIGHT: 64 IN | DIASTOLIC BLOOD PRESSURE: 92 MMHG | BODY MASS INDEX: 50.02 KG/M2 | HEART RATE: 68 BPM | WEIGHT: 293 LBS

## 2019-12-24 DIAGNOSIS — M72.2 PLANTAR FASCIITIS: Primary | ICD-10-CM

## 2019-12-24 PROCEDURE — 99999 PR PBB SHADOW E&M-EST. PATIENT-LVL III: ICD-10-PCS | Mod: PBBFAC,HCNC,, | Performed by: PODIATRIST

## 2019-12-24 PROCEDURE — 99999 PR PBB SHADOW E&M-EST. PATIENT-LVL III: CPT | Mod: PBBFAC,HCNC,, | Performed by: PODIATRIST

## 2019-12-24 PROCEDURE — 99024 PR POST-OP FOLLOW-UP VISIT: ICD-10-PCS | Mod: HCNC,S$GLB,, | Performed by: PODIATRIST

## 2019-12-24 PROCEDURE — 99024 POSTOP FOLLOW-UP VISIT: CPT | Mod: HCNC,S$GLB,, | Performed by: PODIATRIST

## 2019-12-27 DIAGNOSIS — G93.2 IIH (IDIOPATHIC INTRACRANIAL HYPERTENSION): ICD-10-CM

## 2019-12-27 DIAGNOSIS — G89.29 CHRONIC NONINTRACTABLE HEADACHE, UNSPECIFIED HEADACHE TYPE: ICD-10-CM

## 2019-12-27 DIAGNOSIS — R51.9 CHRONIC NONINTRACTABLE HEADACHE, UNSPECIFIED HEADACHE TYPE: ICD-10-CM

## 2019-12-29 NOTE — PROGRESS NOTES
Approximately 4 weeks status post plantar fasciectomy.  She is doing well with some discomfort however which she describes as more surgical pain rather than heel pain compared to preoperatively.  She is working herself out of the walking boot.  She will follow up in 2 weeks.  May consider physical therapy at that time.

## 2019-12-30 RX ORDER — VENLAFAXINE HYDROCHLORIDE 75 MG/1
CAPSULE, EXTENDED RELEASE ORAL
Qty: 90 CAPSULE | Refills: 3 | Status: SHIPPED | OUTPATIENT
Start: 2019-12-30 | End: 2020-07-15 | Stop reason: ALTCHOICE

## 2019-12-30 RX ORDER — METFORMIN HYDROCHLORIDE 500 MG/1
TABLET, EXTENDED RELEASE ORAL
Qty: 360 TABLET | Refills: 0 | Status: SHIPPED | OUTPATIENT
Start: 2019-12-30 | End: 2020-03-27

## 2019-12-30 RX ORDER — LOSARTAN POTASSIUM 100 MG/1
TABLET ORAL
Qty: 90 TABLET | Refills: 0 | Status: SHIPPED | OUTPATIENT
Start: 2019-12-30 | End: 2020-01-27 | Stop reason: SDUPTHER

## 2020-01-13 ENCOUNTER — OFFICE VISIT (OUTPATIENT)
Dept: PODIATRY | Facility: CLINIC | Age: 50
End: 2020-01-13
Payer: MEDICARE

## 2020-01-13 ENCOUNTER — TELEPHONE (OUTPATIENT)
Dept: PODIATRY | Facility: CLINIC | Age: 50
End: 2020-01-13

## 2020-01-13 VITALS
HEIGHT: 64 IN | BODY MASS INDEX: 50.02 KG/M2 | WEIGHT: 293 LBS | HEART RATE: 80 BPM | DIASTOLIC BLOOD PRESSURE: 86 MMHG | SYSTOLIC BLOOD PRESSURE: 135 MMHG

## 2020-01-13 DIAGNOSIS — M72.2 PLANTAR FASCIITIS: Primary | ICD-10-CM

## 2020-01-13 PROCEDURE — 99024 PR POST-OP FOLLOW-UP VISIT: ICD-10-PCS | Mod: HCNC,S$GLB,, | Performed by: PODIATRIST

## 2020-01-13 PROCEDURE — 99024 POSTOP FOLLOW-UP VISIT: CPT | Mod: HCNC,S$GLB,, | Performed by: PODIATRIST

## 2020-01-13 PROCEDURE — 99999 PR PBB SHADOW E&M-EST. PATIENT-LVL III: CPT | Mod: PBBFAC,HCNC,, | Performed by: PODIATRIST

## 2020-01-13 PROCEDURE — 99999 PR PBB SHADOW E&M-EST. PATIENT-LVL III: ICD-10-PCS | Mod: PBBFAC,HCNC,, | Performed by: PODIATRIST

## 2020-01-13 RX ORDER — OXYCODONE AND ACETAMINOPHEN 5; 325 MG/1; MG/1
1 TABLET ORAL
Qty: 30 TABLET | Refills: 0 | Status: ON HOLD | OUTPATIENT
Start: 2020-01-13 | End: 2020-01-18 | Stop reason: HOSPADM

## 2020-01-13 RX ORDER — METHYLPREDNISOLONE 4 MG/1
TABLET ORAL
Qty: 1 PACKAGE | Refills: 1 | Status: ON HOLD | OUTPATIENT
Start: 2020-01-13 | End: 2020-01-18 | Stop reason: HOSPADM

## 2020-01-13 NOTE — TELEPHONE ENCOUNTER
Wrote the name from ariela down and brought it over to clinic to inform them that pat is running late due to traffic

## 2020-01-13 NOTE — TELEPHONE ENCOUNTER
----- Message from Charly Galicia sent at 1/13/2020 10:53 AM CST -----  Contact: pt: [phone]  Pt called to notify clinic that she's going to be late to today's appt due to traffic       Please contact pt: [phone]

## 2020-01-14 ENCOUNTER — TELEPHONE (OUTPATIENT)
Dept: GASTROENTEROLOGY | Facility: CLINIC | Age: 50
End: 2020-01-14

## 2020-01-16 ENCOUNTER — OFFICE VISIT (OUTPATIENT)
Dept: FAMILY MEDICINE | Facility: CLINIC | Age: 50
DRG: 202 | End: 2020-01-16
Payer: MEDICARE

## 2020-01-16 ENCOUNTER — HOSPITAL ENCOUNTER (INPATIENT)
Facility: HOSPITAL | Age: 50
LOS: 2 days | Discharge: HOME | DRG: 202 | End: 2020-01-18
Attending: EMERGENCY MEDICINE | Admitting: HOSPITALIST
Payer: MEDICARE

## 2020-01-16 VITALS
OXYGEN SATURATION: 95 % | HEART RATE: 131 BPM | DIASTOLIC BLOOD PRESSURE: 96 MMHG | BODY MASS INDEX: 50.02 KG/M2 | WEIGHT: 293 LBS | HEIGHT: 64 IN | SYSTOLIC BLOOD PRESSURE: 146 MMHG | RESPIRATION RATE: 17 BRPM

## 2020-01-16 DIAGNOSIS — Z79.4 TYPE 2 DIABETES MELLITUS WITH STAGE 3 CHRONIC KIDNEY DISEASE, WITH LONG-TERM CURRENT USE OF INSULIN: ICD-10-CM

## 2020-01-16 DIAGNOSIS — N18.30 TYPE 2 DIABETES MELLITUS WITH STAGE 3 CHRONIC KIDNEY DISEASE, WITH LONG-TERM CURRENT USE OF INSULIN: ICD-10-CM

## 2020-01-16 DIAGNOSIS — J44.1 COPD EXACERBATION: ICD-10-CM

## 2020-01-16 DIAGNOSIS — J10.1 INFLUENZA A: Primary | ICD-10-CM

## 2020-01-16 DIAGNOSIS — J45.41 MODERATE PERSISTENT ASTHMA WITH ACUTE EXACERBATION: Primary | ICD-10-CM

## 2020-01-16 DIAGNOSIS — R05.9 COUGH: ICD-10-CM

## 2020-01-16 DIAGNOSIS — E11.22 TYPE 2 DIABETES MELLITUS WITH STAGE 3 CHRONIC KIDNEY DISEASE, WITH LONG-TERM CURRENT USE OF INSULIN: ICD-10-CM

## 2020-01-16 PROBLEM — R06.03 ACUTE RESPIRATORY DISTRESS: Status: ACTIVE | Noted: 2020-01-16

## 2020-01-16 LAB
ALBUMIN SERPL BCP-MCNC: 3.4 G/DL (ref 3.5–5.2)
ALLENS TEST: ABNORMAL
ALP SERPL-CCNC: 150 U/L (ref 55–135)
ALT SERPL W/O P-5'-P-CCNC: 233 U/L (ref 10–44)
ANION GAP SERPL CALC-SCNC: 8 MMOL/L (ref 8–16)
AST SERPL-CCNC: 190 U/L (ref 10–40)
BASOPHILS # BLD AUTO: 0.02 K/UL (ref 0–0.2)
BASOPHILS NFR BLD: 0.4 % (ref 0–1.9)
BILIRUB SERPL-MCNC: 0.3 MG/DL (ref 0.1–1)
BNP SERPL-MCNC: <10 PG/ML (ref 0–99)
BUN SERPL-MCNC: 17 MG/DL (ref 6–20)
CALCIUM SERPL-MCNC: 9.5 MG/DL (ref 8.7–10.5)
CHLORIDE SERPL-SCNC: 110 MMOL/L (ref 95–110)
CO2 SERPL-SCNC: 20 MMOL/L (ref 23–29)
CREAT SERPL-MCNC: 1.8 MG/DL (ref 0.5–1.4)
CTP QC/QA: YES
DIFFERENTIAL METHOD: ABNORMAL
EOSINOPHIL # BLD AUTO: 0 K/UL (ref 0–0.5)
EOSINOPHIL NFR BLD: 0.4 % (ref 0–8)
ERYTHROCYTE [DISTWIDTH] IN BLOOD BY AUTOMATED COUNT: 15.8 % (ref 11.5–14.5)
EST. GFR  (AFRICAN AMERICAN): 38 ML/MIN/1.73 M^2
EST. GFR  (NON AFRICAN AMERICAN): 33 ML/MIN/1.73 M^2
GLUCOSE SERPL-MCNC: 75 MG/DL (ref 70–110)
HCO3 UR-SCNC: 22.4 MMOL/L (ref 24–28)
HCT VFR BLD AUTO: 43.1 % (ref 37–48.5)
HGB BLD-MCNC: 13.6 G/DL (ref 12–16)
IMM GRANULOCYTES # BLD AUTO: 0.02 K/UL (ref 0–0.04)
IMM GRANULOCYTES NFR BLD AUTO: 0.4 % (ref 0–0.5)
LACTATE SERPL-SCNC: 1.5 MMOL/L (ref 0.5–2.2)
LIPASE SERPL-CCNC: 57 U/L (ref 4–60)
LYMPHOCYTES # BLD AUTO: 0.5 K/UL (ref 1–4.8)
LYMPHOCYTES NFR BLD: 8.6 % (ref 18–48)
MCH RBC QN AUTO: 29.2 PG (ref 27–31)
MCHC RBC AUTO-ENTMCNC: 31.6 G/DL (ref 32–36)
MCV RBC AUTO: 93 FL (ref 82–98)
MONOCYTES # BLD AUTO: 0.6 K/UL (ref 0.3–1)
MONOCYTES NFR BLD: 11.2 % (ref 4–15)
NEUTROPHILS # BLD AUTO: 4.2 K/UL (ref 1.8–7.7)
NEUTROPHILS NFR BLD: 79 % (ref 38–73)
NRBC BLD-RTO: 0 /100 WBC
PCO2 BLDA: 48.5 MMHG (ref 35–45)
PH SMN: 7.27 [PH] (ref 7.35–7.45)
PLATELET # BLD AUTO: 242 K/UL (ref 150–350)
PMV BLD AUTO: 8.6 FL (ref 9.2–12.9)
PO2 BLDA: 17 MMHG (ref 40–60)
POC BE: -5 MMOL/L
POC MOLECULAR INFLUENZA A AGN: POSITIVE
POC MOLECULAR INFLUENZA B AGN: NEGATIVE
POC SATURATED O2: 19 % (ref 95–100)
POC TCO2: 24 MMOL/L (ref 24–29)
POCT GLUCOSE: 204 MG/DL (ref 70–110)
POTASSIUM SERPL-SCNC: 3.5 MMOL/L (ref 3.5–5.1)
PROT SERPL-MCNC: 7.6 G/DL (ref 6–8.4)
RBC # BLD AUTO: 4.65 M/UL (ref 4–5.4)
SAMPLE: ABNORMAL
SITE: ABNORMAL
SODIUM SERPL-SCNC: 138 MMOL/L (ref 136–145)
TROPONIN I SERPL DL<=0.01 NG/ML-MCNC: 0.01 NG/ML (ref 0–0.03)
TROPONIN I SERPL DL<=0.01 NG/ML-MCNC: <0.006 NG/ML (ref 0–0.03)
WBC # BLD AUTO: 5.35 K/UL (ref 3.9–12.7)

## 2020-01-16 PROCEDURE — 25000003 PHARM REV CODE 250: Mod: HCNC | Performed by: HOSPITALIST

## 2020-01-16 PROCEDURE — 83690 ASSAY OF LIPASE: CPT | Mod: HCNC

## 2020-01-16 PROCEDURE — 99999 PR PBB SHADOW E&M-EST. PATIENT-LVL III: CPT | Mod: PBBFAC,HCNC,, | Performed by: FAMILY MEDICINE

## 2020-01-16 PROCEDURE — 99215 PR OFFICE/OUTPT VISIT, EST, LEVL V, 40-54 MIN: ICD-10-PCS | Mod: HCNC,25,S$GLB, | Performed by: FAMILY MEDICINE

## 2020-01-16 PROCEDURE — 93005 ELECTROCARDIOGRAM TRACING: CPT | Mod: HCNC

## 2020-01-16 PROCEDURE — 3077F SYST BP >= 140 MM HG: CPT | Mod: HCNC,CPTII,S$GLB, | Performed by: FAMILY MEDICINE

## 2020-01-16 PROCEDURE — 94640 AIRWAY INHALATION TREATMENT: CPT | Mod: HCNC

## 2020-01-16 PROCEDURE — 25000003 PHARM REV CODE 250: Mod: HCNC | Performed by: EMERGENCY MEDICINE

## 2020-01-16 PROCEDURE — 94640 PR INHAL RX, AIRWAY OBST/DX SPUTUM INDUCT: ICD-10-PCS | Mod: HCNC,S$GLB,, | Performed by: FAMILY MEDICINE

## 2020-01-16 PROCEDURE — 99999 PR PBB SHADOW E&M-EST. PATIENT-LVL III: ICD-10-PCS | Mod: PBBFAC,HCNC,, | Performed by: FAMILY MEDICINE

## 2020-01-16 PROCEDURE — 99900035 HC TECH TIME PER 15 MIN (STAT): Mod: HCNC

## 2020-01-16 PROCEDURE — 93010 ELECTROCARDIOGRAM REPORT: CPT | Mod: HCNC,,, | Performed by: INTERNAL MEDICINE

## 2020-01-16 PROCEDURE — 63600175 PHARM REV CODE 636 W HCPCS: Mod: HCNC | Performed by: HOSPITALIST

## 2020-01-16 PROCEDURE — 99215 OFFICE O/P EST HI 40 MIN: CPT | Mod: HCNC,25,S$GLB, | Performed by: FAMILY MEDICINE

## 2020-01-16 PROCEDURE — 94640 AIRWAY INHALATION TREATMENT: CPT | Mod: HCNC,S$GLB,, | Performed by: FAMILY MEDICINE

## 2020-01-16 PROCEDURE — 96375 TX/PRO/DX INJ NEW DRUG ADDON: CPT | Mod: HCNC

## 2020-01-16 PROCEDURE — 63600175 PHARM REV CODE 636 W HCPCS: Mod: HCNC | Performed by: EMERGENCY MEDICINE

## 2020-01-16 PROCEDURE — 3008F PR BODY MASS INDEX (BMI) DOCUMENTED: ICD-10-PCS | Mod: HCNC,CPTII,S$GLB, | Performed by: FAMILY MEDICINE

## 2020-01-16 PROCEDURE — 85025 COMPLETE CBC W/AUTO DIFF WBC: CPT | Mod: HCNC

## 2020-01-16 PROCEDURE — 87502 INFLUENZA DNA AMP PROBE: CPT | Mod: HCNC

## 2020-01-16 PROCEDURE — 83880 ASSAY OF NATRIURETIC PEPTIDE: CPT | Mod: HCNC

## 2020-01-16 PROCEDURE — 80053 COMPREHEN METABOLIC PANEL: CPT | Mod: HCNC

## 2020-01-16 PROCEDURE — 25000242 PHARM REV CODE 250 ALT 637 W/ HCPCS: Mod: HCNC | Performed by: EMERGENCY MEDICINE

## 2020-01-16 PROCEDURE — 84484 ASSAY OF TROPONIN QUANT: CPT | Mod: HCNC

## 2020-01-16 PROCEDURE — 99285 EMERGENCY DEPT VISIT HI MDM: CPT | Mod: 25,HCNC

## 2020-01-16 PROCEDURE — 93010 EKG 12-LEAD: ICD-10-PCS | Mod: HCNC,,, | Performed by: INTERNAL MEDICINE

## 2020-01-16 PROCEDURE — 3044F PR MOST RECENT HEMOGLOBIN A1C LEVEL <7.0%: ICD-10-PCS | Mod: HCNC,CPTII,S$GLB, | Performed by: FAMILY MEDICINE

## 2020-01-16 PROCEDURE — 83605 ASSAY OF LACTIC ACID: CPT | Mod: HCNC

## 2020-01-16 PROCEDURE — 3077F PR MOST RECENT SYSTOLIC BLOOD PRESSURE >= 140 MM HG: ICD-10-PCS | Mod: HCNC,CPTII,S$GLB, | Performed by: FAMILY MEDICINE

## 2020-01-16 PROCEDURE — 11000001 HC ACUTE MED/SURG PRIVATE ROOM: Mod: HCNC

## 2020-01-16 PROCEDURE — 82803 BLOOD GASES ANY COMBINATION: CPT | Mod: HCNC

## 2020-01-16 PROCEDURE — 96374 THER/PROPH/DIAG INJ IV PUSH: CPT | Mod: HCNC

## 2020-01-16 PROCEDURE — 3044F HG A1C LEVEL LT 7.0%: CPT | Mod: HCNC,CPTII,S$GLB, | Performed by: FAMILY MEDICINE

## 2020-01-16 PROCEDURE — 3080F DIAST BP >= 90 MM HG: CPT | Mod: HCNC,CPTII,S$GLB, | Performed by: FAMILY MEDICINE

## 2020-01-16 PROCEDURE — 3008F BODY MASS INDEX DOCD: CPT | Mod: HCNC,CPTII,S$GLB, | Performed by: FAMILY MEDICINE

## 2020-01-16 PROCEDURE — 3080F PR MOST RECENT DIASTOLIC BLOOD PRESSURE >= 90 MM HG: ICD-10-PCS | Mod: HCNC,CPTII,S$GLB, | Performed by: FAMILY MEDICINE

## 2020-01-16 RX ORDER — ONDANSETRON 2 MG/ML
4 INJECTION INTRAMUSCULAR; INTRAVENOUS
Status: COMPLETED | OUTPATIENT
Start: 2020-01-16 | End: 2020-01-16

## 2020-01-16 RX ORDER — ENOXAPARIN SODIUM 100 MG/ML
40 INJECTION SUBCUTANEOUS EVERY 24 HOURS
Status: DISCONTINUED | OUTPATIENT
Start: 2020-01-16 | End: 2020-01-18 | Stop reason: HOSPADM

## 2020-01-16 RX ORDER — LOSARTAN POTASSIUM 25 MG/1
100 TABLET ORAL DAILY
Status: DISCONTINUED | OUTPATIENT
Start: 2020-01-17 | End: 2020-01-18 | Stop reason: HOSPADM

## 2020-01-16 RX ORDER — ACETAMINOPHEN 325 MG/1
650 TABLET ORAL EVERY 4 HOURS PRN
Status: DISCONTINUED | OUTPATIENT
Start: 2020-01-16 | End: 2020-01-18 | Stop reason: HOSPADM

## 2020-01-16 RX ORDER — INSULIN ASPART 100 [IU]/ML
1-10 INJECTION, SOLUTION INTRAVENOUS; SUBCUTANEOUS
Status: DISCONTINUED | OUTPATIENT
Start: 2020-01-16 | End: 2020-01-18 | Stop reason: HOSPADM

## 2020-01-16 RX ORDER — METHYLPREDNISOLONE SOD SUCC 125 MG
80 VIAL (EA) INJECTION EVERY 8 HOURS
Status: DISCONTINUED | OUTPATIENT
Start: 2020-01-16 | End: 2020-01-18 | Stop reason: HOSPADM

## 2020-01-16 RX ORDER — TOPIRAMATE 100 MG/1
300 TABLET, FILM COATED ORAL 2 TIMES DAILY
Status: DISCONTINUED | OUTPATIENT
Start: 2020-01-16 | End: 2020-01-18 | Stop reason: HOSPADM

## 2020-01-16 RX ORDER — SODIUM CHLORIDE 9 MG/ML
INJECTION, SOLUTION INTRAVENOUS CONTINUOUS
Status: DISCONTINUED | OUTPATIENT
Start: 2020-01-16 | End: 2020-01-17

## 2020-01-16 RX ORDER — AZITHROMYCIN 250 MG/1
500 TABLET, FILM COATED ORAL
Status: COMPLETED | OUTPATIENT
Start: 2020-01-16 | End: 2020-01-16

## 2020-01-16 RX ORDER — OXYCODONE AND ACETAMINOPHEN 5; 325 MG/1; MG/1
1 TABLET ORAL EVERY 4 HOURS PRN
Status: DISCONTINUED | OUTPATIENT
Start: 2020-01-16 | End: 2020-01-18 | Stop reason: HOSPADM

## 2020-01-16 RX ORDER — FLUTICASONE FUROATE AND VILANTEROL 100; 25 UG/1; UG/1
1 POWDER RESPIRATORY (INHALATION) DAILY
Status: DISCONTINUED | OUTPATIENT
Start: 2020-01-17 | End: 2020-01-18 | Stop reason: HOSPADM

## 2020-01-16 RX ORDER — LEVOFLOXACIN 5 MG/ML
750 INJECTION, SOLUTION INTRAVENOUS
Status: DISCONTINUED | OUTPATIENT
Start: 2020-01-16 | End: 2020-01-18 | Stop reason: HOSPADM

## 2020-01-16 RX ORDER — GUAIFENESIN/DEXTROMETHORPHAN 100-10MG/5
5 SYRUP ORAL EVERY 6 HOURS
Status: DISCONTINUED | OUTPATIENT
Start: 2020-01-16 | End: 2020-01-18 | Stop reason: HOSPADM

## 2020-01-16 RX ORDER — IPRATROPIUM BROMIDE 0.5 MG/2.5ML
1 SOLUTION RESPIRATORY (INHALATION) ONCE
Status: COMPLETED | OUTPATIENT
Start: 2020-01-16 | End: 2020-01-16

## 2020-01-16 RX ORDER — QUETIAPINE FUMARATE 25 MG/1
25 TABLET, FILM COATED ORAL NIGHTLY
Status: DISCONTINUED | OUTPATIENT
Start: 2020-01-16 | End: 2020-01-18 | Stop reason: HOSPADM

## 2020-01-16 RX ORDER — ALBUTEROL SULFATE 2.5 MG/.5ML
5 SOLUTION RESPIRATORY (INHALATION)
Status: COMPLETED | OUTPATIENT
Start: 2020-01-16 | End: 2020-01-16

## 2020-01-16 RX ORDER — HYDROXYZINE HYDROCHLORIDE 50 MG/1
TABLET, FILM COATED ORAL
COMMUNITY
Start: 2019-12-30 | End: 2020-05-13

## 2020-01-16 RX ORDER — IPRATROPIUM BROMIDE AND ALBUTEROL SULFATE 2.5; .5 MG/3ML; MG/3ML
3 SOLUTION RESPIRATORY (INHALATION)
Status: DISCONTINUED | OUTPATIENT
Start: 2020-01-16 | End: 2020-01-16 | Stop reason: HOSPADM

## 2020-01-16 RX ORDER — GLUCAGON 1 MG
1 KIT INJECTION
Status: DISCONTINUED | OUTPATIENT
Start: 2020-01-16 | End: 2020-01-18 | Stop reason: HOSPADM

## 2020-01-16 RX ORDER — METHYLPREDNISOLONE SOD SUCC 125 MG
80 VIAL (EA) INJECTION
Status: COMPLETED | OUTPATIENT
Start: 2020-01-16 | End: 2020-01-16

## 2020-01-16 RX ORDER — CETIRIZINE HYDROCHLORIDE 5 MG/1
5 TABLET ORAL DAILY
Status: DISCONTINUED | OUTPATIENT
Start: 2020-01-17 | End: 2020-01-18 | Stop reason: HOSPADM

## 2020-01-16 RX ORDER — IPRATROPIUM BROMIDE AND ALBUTEROL SULFATE 2.5; .5 MG/3ML; MG/3ML
3 SOLUTION RESPIRATORY (INHALATION)
Status: DISCONTINUED | OUTPATIENT
Start: 2020-01-16 | End: 2020-01-18 | Stop reason: HOSPADM

## 2020-01-16 RX ORDER — ONDANSETRON 2 MG/ML
8 INJECTION INTRAMUSCULAR; INTRAVENOUS EVERY 6 HOURS PRN
Status: DISCONTINUED | OUTPATIENT
Start: 2020-01-16 | End: 2020-01-18 | Stop reason: HOSPADM

## 2020-01-16 RX ORDER — ACETAZOLAMIDE 500 MG/1
500 CAPSULE, EXTENDED RELEASE ORAL 2 TIMES DAILY
Status: DISCONTINUED | OUTPATIENT
Start: 2020-01-16 | End: 2020-01-17

## 2020-01-16 RX ORDER — IBUPROFEN 200 MG
24 TABLET ORAL
Status: DISCONTINUED | OUTPATIENT
Start: 2020-01-16 | End: 2020-01-18 | Stop reason: HOSPADM

## 2020-01-16 RX ORDER — IBUPROFEN 200 MG
16 TABLET ORAL
Status: DISCONTINUED | OUTPATIENT
Start: 2020-01-16 | End: 2020-01-18 | Stop reason: HOSPADM

## 2020-01-16 RX ORDER — PANTOPRAZOLE SODIUM 40 MG/1
40 TABLET, DELAYED RELEASE ORAL DAILY
Status: DISCONTINUED | OUTPATIENT
Start: 2020-01-17 | End: 2020-01-18 | Stop reason: HOSPADM

## 2020-01-16 RX ORDER — ACETAMINOPHEN 500 MG
1000 TABLET ORAL
Status: COMPLETED | OUTPATIENT
Start: 2020-01-16 | End: 2020-01-16

## 2020-01-16 RX ORDER — SODIUM CHLORIDE 0.9 % (FLUSH) 0.9 %
10 SYRINGE (ML) INJECTION
Status: DISCONTINUED | OUTPATIENT
Start: 2020-01-16 | End: 2020-01-16

## 2020-01-16 RX ORDER — OSELTAMIVIR PHOSPHATE 30 MG/1
30 CAPSULE ORAL 2 TIMES DAILY
Status: DISCONTINUED | OUTPATIENT
Start: 2020-01-16 | End: 2020-01-17

## 2020-01-16 RX ORDER — BENZONATATE 100 MG/1
200 CAPSULE ORAL 3 TIMES DAILY PRN
Status: DISCONTINUED | OUTPATIENT
Start: 2020-01-16 | End: 2020-01-18 | Stop reason: HOSPADM

## 2020-01-16 RX ORDER — METHYLPREDNISOLONE SOD SUCC 125 MG
80 VIAL (EA) INJECTION 2 TIMES DAILY
Status: DISCONTINUED | OUTPATIENT
Start: 2020-01-16 | End: 2020-01-16

## 2020-01-16 RX ORDER — POLYETHYLENE GLYCOL 3350 17 G/17G
17 POWDER, FOR SOLUTION ORAL 2 TIMES DAILY PRN
Status: DISCONTINUED | OUTPATIENT
Start: 2020-01-16 | End: 2020-01-18 | Stop reason: HOSPADM

## 2020-01-16 RX ORDER — ATORVASTATIN CALCIUM 40 MG/1
40 TABLET, FILM COATED ORAL DAILY
Status: DISCONTINUED | OUTPATIENT
Start: 2020-01-17 | End: 2020-01-18 | Stop reason: HOSPADM

## 2020-01-16 RX ADMIN — GUAIFENESIN AND DEXTROMETHORPHAN 5 ML: 100; 10 SYRUP ORAL at 06:01

## 2020-01-16 RX ADMIN — LEVOFLOXACIN 750 MG: 750 INJECTION, SOLUTION INTRAVENOUS at 06:01

## 2020-01-16 RX ADMIN — CEFTRIAXONE 1 G: 1 INJECTION, SOLUTION INTRAVENOUS at 03:01

## 2020-01-16 RX ADMIN — ACETAMINOPHEN 1000 MG: 500 TABLET ORAL at 02:01

## 2020-01-16 RX ADMIN — ENOXAPARIN SODIUM 40 MG: 100 INJECTION SUBCUTANEOUS at 07:01

## 2020-01-16 RX ADMIN — QUETIAPINE FUMARATE 25 MG: 25 TABLET ORAL at 09:01

## 2020-01-16 RX ADMIN — BENZONATATE 200 MG: 100 CAPSULE ORAL at 09:01

## 2020-01-16 RX ADMIN — IPRATROPIUM BROMIDE AND ALBUTEROL SULFATE 3 ML: 2.5; .5 SOLUTION RESPIRATORY (INHALATION) at 11:01

## 2020-01-16 RX ADMIN — IPRATROPIUM BROMIDE AND ALBUTEROL SULFATE 3 ML: .5; 3 SOLUTION RESPIRATORY (INHALATION) at 07:01

## 2020-01-16 RX ADMIN — AZITHROMYCIN MONOHYDRATE 500 MG: 250 TABLET ORAL at 03:01

## 2020-01-16 RX ADMIN — ONDANSETRON HYDROCHLORIDE 4 MG: 2 SOLUTION INTRAMUSCULAR; INTRAVENOUS at 12:01

## 2020-01-16 RX ADMIN — IPRATROPIUM BROMIDE 1 MG: 0.5 SOLUTION RESPIRATORY (INHALATION) at 12:01

## 2020-01-16 RX ADMIN — METHYLPREDNISOLONE SODIUM SUCCINATE 80 MG: 125 INJECTION, POWDER, FOR SOLUTION INTRAMUSCULAR; INTRAVENOUS at 02:01

## 2020-01-16 RX ADMIN — TOPIRAMATE 300 MG: 100 TABLET, FILM COATED ORAL at 09:01

## 2020-01-16 RX ADMIN — OXYCODONE HYDROCHLORIDE AND ACETAMINOPHEN 1 TABLET: 5; 325 TABLET ORAL at 09:01

## 2020-01-16 RX ADMIN — ALBUTEROL SULFATE 5 MG: 2.5 SOLUTION RESPIRATORY (INHALATION) at 12:01

## 2020-01-16 RX ADMIN — SODIUM CHLORIDE: 0.9 INJECTION, SOLUTION INTRAVENOUS at 06:01

## 2020-01-16 NOTE — HPI
"50 y/o female presents with shortness of breath.  Patient complaining of 3 days of fatigue, nausea, vomiting, diarrhea and shortness of breath.  Patient is a known asthmatic who does continue to smoke despite being admitted to ICU more than once for exacerbations.  She states the last time she smoked was "a couple days ago".  She has been having vomiting and diarrhea multiple times a day for 3 days.  She has been using her rescue inhaler multiple times with no improvement.  She feels tight and hears herself wheezing.  Also complaining of a productive cough.  She has been feeling hot and cold, but has not checked her temperature. She does have body aches as well.  She presented to ER where she was noted to be febrile and tachycardic.  No other complaints.  "

## 2020-01-16 NOTE — PROGRESS NOTES
"Routine Office Visit    Patient Name: Yanni Kaiser    : 1970  MRN: [id number]    Subjective:  Yanni is a 49 y.o. female who presents today for:    1. Shortness of breath, fatigue, n/v/d  Patient presenting today with 3 days of fatigue, n/v/d, and shortness of breath.  Patient is a known asthmatic who does continue to smoke despite being admitted to ICU more than once for exacerbations.  She states the last time she smoked was "a couple days ago".  She has been having vomiting and diarrhea multiple times a day for 3 days.  She has been using her rescue inhaler multiple times.  She feels tight and hears herself wheezing.  The cough is productive.  She has been feeling hot and cold, but has not checked her temperature.  Patient denies seeing blood in emesis or stool.  She does have body aches as well.  Patient is a controlled diabetic, but states she hasn't checked blood sugars in the past couple of days.    Past Medical History  Past Medical History:   Diagnosis Date    Allergy     Anemia     Asthma     Back pain     Chronic bronchitis     Cigarette smoker     Depression     Diabetes with neurologic complications     DUB (dysfunctional uterine bleeding) 10/16/2018    GERD (gastroesophageal reflux disease)     High cholesterol     Hyperprolactinemia     Hypertension     Obese body habitus     Pseudotumor cerebri     Renal manifestation of secondary diabetes mellitus     Respiratory failure     Seizures     Simple endometrial hyperplasia 2018    Sleep apnea     Smoker     Tobacco dependence        Past Surgical History  Past Surgical History:   Procedure Laterality Date    BREAST CYST ASPIRATION       SECTION      X 1    CHOLECYSTECTOMY      COLONOSCOPY      COLONOSCOPY N/A 2019    Procedure: COLONOSCOPY;  Surgeon: Jagruti Sweeney MD;  Location: [address]);  Service: Endoscopy;  Laterality: N/A;  BMI is 63/gastroparesis/3 days full liquid diet 1 day clear " liquid see telephone encounter by Ciara Pinto [facility]    ESOPHAGEAL MANOMETRY WITH MEASUREMENT OF IMPEDANCE N/A 11/5/2019    Procedure: MANOMETRY-ESOPHAGEAL-WITH IMPEDANCE;  Surgeon: Jagruti Sweeney MD;  Location: [facility] (2ND FLR);  Service: Endoscopy;  Laterality: N/A;  Hold Narcotics x 1 days   Hold TCA x 1 days  Propofol only. No fentanyl or benzodiazepine during sedation. If additional sedation needed, discuss with Dr. Sweeney.  10/29 - pt confirmed appt    ESOPHAGOGASTRODUODENOSCOPY N/A 1/14/2019    Procedure: EGD (ESOPHAGOGASTRODUODENOSCOPY);  Surgeon: Jagruti Sweeney MD;  Location: [facility] (2ND FLR);  Service: Endoscopy;  Laterality: N/A;  BMI is 63/gastroparesis/3 days full liquid diet 1 day clear liquid see telephone encounter by Ciara Pinto [facility]    ESOPHAGOGASTRODUODENOSCOPY N/A 11/5/2019    Procedure: ESOPHAGOGASTRODUODENOSCOPY (EGD);  Surgeon: Jagruti Sweeney MD;  Location: [facility] ([address] [place] [address]);  Service: Endoscopy;  Laterality: N/A;  EGD with EndoFlip /+/- Botox  2nd floor for gastroparesis/BMI 63 **367lbs**  Bariatric Stretcher needed  Manometry probe to be placed endoscopically during EGD procedure  Due to change in protocol, Full liquid diet for 3 days/ 1 day of clear liquids  Hi    FOOT FRACTURE SURGERY Left     HYSTEROSCOPY WITH DILATION AND CURETTAGE OF UTERUS N/A 10/16/2018    [facility]    PLANTAR FASCIOTOMY Left 11/18/2019    Procedure: FASCIOTOMY, PLANTAR;  Surgeon: Valentino Orourke DPM;  Location: [facility] OR [address] [place] [address];  Service: Podiatry;  Laterality: Left;    RETINAL LASER PROCEDURE Left     SINUS SURGERY      UPPER GASTROINTESTINAL ENDOSCOPY         Family History  Family History   Problem Relation Age of Onset    Hypertension Mother     Diabetes Mother     Colon cancer Mother 50    Colon polyps Mother     Heart disease Father     COPD Father     Heart attack Father     Hypertension Father     Ulcers Father     Cancer Maternal Grandmother     Breast cancer Maternal  Grandmother     Colon cancer Maternal Grandmother     Colon polyps Maternal Grandmother     No Known Problems Paternal Grandmother     No Known Problems Brother     No Known Problems Maternal Aunt     No Known Problems Maternal Uncle     No Known Problems Paternal Aunt     No Known Problems Paternal Uncle     No Known Problems Maternal Grandfather     No Known Problems Paternal Grandfather     Celiac disease Neg Hx     Cirrhosis Neg Hx     Crohn's disease Neg Hx     Cystic fibrosis Neg Hx     Esophageal cancer Neg Hx     Hemochromatosis Neg Hx     Inflammatory bowel disease Neg Hx     Irritable bowel syndrome Neg Hx     Liver cancer Neg Hx     Liver disease Neg Hx     Rectal cancer Neg Hx     Stomach cancer Neg Hx     Ulcerative colitis Neg Hx     Jonnie's disease Neg Hx     Tuberculosis Neg Hx     Lymphoma Neg Hx     Scleroderma Neg Hx     Rheum arthritis Neg Hx     Melanoma Neg Hx     Multiple sclerosis Neg Hx     Psoriasis Neg Hx     Lupus Neg Hx     Skin cancer Neg Hx     Amblyopia Neg Hx     Blindness Neg Hx     Cataracts Neg Hx     Glaucoma Neg Hx     Macular degeneration Neg Hx     Retinal detachment Neg Hx     Strabismus Neg Hx     Stroke Neg Hx     Thyroid disease Neg Hx        Social History  Social History     Socioeconomic History    Marital status:      Spouse name: Not on file    Number of children: Not on file    Years of education: Not on file    Highest education level: Not on file   Occupational History    Not on file   Social Needs    Financial resource strain: Not on file    Food insecurity:     Worry: Not on file     Inability: Not on file    Transportation needs:     Medical: Not on file     Non-medical: Not on file   Tobacco Use    Smoking status: Current Every Day Smoker     Packs/day: 1.00     Years: 27.00     Pack years: 27.00     Types: Cigarettes     Start date: 1/1/1992    Smokeless tobacco: Never Used    Tobacco comment: 1/2 therese  pack if her days are good   Substance and Sexual Activity    Alcohol use: No     Alcohol/week: 0.0 standard drinks    Drug use: No    Sexual activity: Yes     Partners: Male     Birth control/protection: None   Lifestyle    Physical activity:     Days per week: Not on file     Minutes per session: Not on file    Stress: Not on file   Relationships    Social connections:     Talks on phone: Not on file     Gets together: Not on file     Attends [employer] service: Not on file     Active member of club or organization: Not on file     Attends meetings of clubs or organizations: Not on file     Relationship status: Not on file   Other Topics Concern    Not on file   Social History Narrative    Not on file       Current Medications  Current Outpatient Medications on File Prior to Visit   Medication Sig Dispense Refill    acetaZOLAMIDE (DIAMOX) 500 mg CpSR Take 1 capsule (500 mg total) by mouth 2 (two) times daily. 360 capsule 1    albuterol (PROVENTIL/VENTOLIN HFA) 90 mcg/actuation inhaler Inhale 2 puffs into the lungs every 6 (six) hours as needed. Rescue 54 g 0    albuterol-ipratropium (DUO-NEB) 2.5 mg-0.5 mg/3 mL nebulizer solution Take 3 mLs by nebulization every 6 (six) hours as needed for Wheezing or Shortness of Breath. Rescue 100 mL 3    atorvastatin (LIPITOR) 40 MG tablet Take 1 tablet (40 mg total) by mouth once daily. 90 tablet 3    blood sugar diagnostic Strp 1 each by Misc.(Non-Drug; Combo Route) route 4 (four) times daily before meals and nightly. ACCU CHEK ELVIA PLUS METER 200 each 3    blood-glucose meter (ACCU-CHEK ELVIA PLUS METER) Misc TEST FOUR TIMES DAILY BEFORE MEALS AND EVERY NIGHT 90 each 1    budesonide-formoterol 160-4.5 mcg (SYMBICORT) 160-4.5 mcg/actuation HFAA Inhale 2 puffs into the lungs every 12 (twelve) hours. Controller 1 Inhaler 5    diclofenac sodium (VOLTAREN) 1 % Gel Apply 2 g topically once daily. 100 g 3    fluticasone propionate (FLONASE) 50 mcg/actuation nasal  spray 1 spray (50 mcg total) by Each Nare route once daily. 15.8 mL 2    hydrOXYzine (ATARAX) 50 MG tablet       lactulose (CHRONULAC) 10 gram/15 mL solution TAKE 15 MLS BY MOUTH THREE TIMES DAILY AS NEEDED 473 mL 6    lancets (ACCU-CHEK SOFTCLIX LANCETS) Misc 1 Device by Misc.(Non-Drug; Combo Route) route 4 (four) times daily. 200 each 3    levocetirizine (XYZAL) 5 MG tablet TAKE 1 TABLET(5 MG) BY MOUTH EVERY EVENING 90 tablet 0    losartan (COZAAR) 100 MG tablet TAKE 1 TABLET(100 MG) BY MOUTH EVERY DAY 90 tablet 0    meloxicam (MOBIC) 15 MG tablet TAKE 1 TABLET(15 MG) BY MOUTH DAILY AS NEEDED FOR HEADACHE 90 tablet 1    metFORMIN (GLUCOPHAGE-XR) 500 MG 24 hr tablet TAKE 2 TABLETS(1000 MG) BY MOUTH TWICE DAILY WITH MEALS 360 tablet 0    methylPREDNISolone (MEDROL DOSEPACK) 4 mg tablet use as directed 1 Package 1    montelukast (SINGULAIR) 10 mg tablet TAKE 1 TABLET(10 MG) BY MOUTH EVERY EVENING 30 tablet 0    ondansetron (ZOFRAN) 8 MG tablet Take 1 tablet (8 mg total) by mouth every 8 (eight) hours as needed for Nausea. 90 tablet 5    oxyCODONE-acetaminophen (PERCOCET)  mg per tablet TAKE 1 TABLET BY MOUTH EVERY 8 HOURS AS NEEDED. MAY CAUSE DROWSINESS  0    oxyCODONE-acetaminophen (PERCOCET) 5-325 mg per tablet Take 1 tablet by mouth every 12 (twelve) hours as needed for Pain. 40 tablet 0    oxyCODONE-acetaminophen (PERCOCET) 5-325 mg per tablet Take 1 tablet by mouth every 24 hours as needed for Pain. 30 tablet 0    pantoprazole (PROTONIX) 40 MG tablet Take 40 mg by mouth once daily.   3    prucalopride 2 mg Tab Take one tablet by mouth once daily. 30 tablet 6    QUEtiapine (SEROQUEL) 25 MG Tab TAKE 1 TABLET(25 MG) BY MOUTH EVERY EVENING 90 tablet 0    semaglutide (OZEMPIC) 1 mg/dose (2 mg/1.5 mL) PnIj Inject 1 mg subq weekly. 3 mL 3    sumatriptan (IMITREX) 50 MG tablet Take 1 tab at onset of headaches.  If no improvement in 2 hours, take another.  Do not take more than 2 tabs in 24 hours.  "9 tablet 5    topiramate (TOPAMAX) 100 MG tablet TAKE 3 TABLETS BY MOUTH TWICE DAILY 180 tablet 11    traMADol (ULTRAM-ER) 100 MG Tb24 Take 1 tablet (100 mg total) by mouth daily as needed. 14 tablet 0    triamcinolone acetonide 0.1% (KENALOG) 0.1 % ointment Apply topically 2 (two) times daily. for 7 days 30 g 0    venlafaxine (EFFEXOR-XR) 75 MG 24 hr capsule TAKE 1 CAPSULE(75 MG) BY MOUTH EVERY EVENING 90 capsule 3     No current facility-administered medications on file prior to visit.        Allergies   Review of patient's allergies indicates:  No Known Allergies    Review of Systems (Pertinent positives)  Review of Systems   Constitutional: Positive for chills and malaise/fatigue. Negative for fever and weight loss.   HENT: Negative for ear pain and sore throat.    Eyes: Negative.    Respiratory: Positive for cough, shortness of breath and wheezing. Negative for hemoptysis.    Cardiovascular: Negative for chest pain.   Gastrointestinal: Negative for heartburn.   Genitourinary: Negative.    Musculoskeletal: Positive for myalgias.   Skin: Negative for rash.   Neurological: Positive for headaches.         BP (!) 146/96 (BP Location: Left arm, Patient Position: Sitting, BP Method: Large (Automatic))   Pulse (!) 131   Resp 17   Ht 5' 4.02" (1.626 m)   Wt (!) 151.7 kg (334 lb 7 oz)   LMP 08/17/2019   SpO2 95%   BMI 57.38 kg/m²     GENERAL APPEARANCE: in no apparent distress and well developed and well nourished  HEENT: PERRL, EOMI, Sclera clear, anicteric, Oropharynx clear, no lesions, Neck supple with midline trachea  NECK: normal, supple, no adenopathy, thyroid normal in size  RESPIRATORY: appears well, vitals normal, no respiratory distress, acyanotic, normal RR, chest clear, no wheezing, crepitations, rhonchi, normal symmetric air entry  HEART: regular rate and rhythm, S1, S2 normal, no murmur, click, rub or gallop.    ABDOMEN: abdomen is soft without tenderness, no masses, no hernias, no organomegaly, " no rebound, no guarding. Suprapubic tenderness absent. No CVA tenderness.  NEUROLOGIC: normal without focal findings, CN II-XII are intact.    Extremities: warm/well perfused.  No abnormal hair patterns.  No clubbing, cyanosis or edema.    SKIN: no rashes, no wounds, no other lesions  PSYCH: Alert, oriented x 3, thought content appropriate, speech normal, pleasant and cooperative, good eye contact, well groomed    Assessment/Plan:  Yanni Kaiser is a 49 y.o. female who presents today for :    Yanni was seen today for cough, generalized body aches, vomiting and chills.    Diagnoses and all orders for this visit:    Moderate persistent asthma with acute exacerbation  -     albuterol-ipratropium 2.5 mg-0.5 mg/3 mL nebulizer solution 3 mL    Type 2 diabetes mellitus with stage 3 chronic kidney disease, with long-term current use of insulin      1.  duoneb given in office and patient stated she was beginning to feel weak  2.  Pulse ox stayed at 94% or higher while here  3.  Patient took zofran this morning  4.  She states she feels like she is going to faint  5.  Called EMS and will send to ED.  Report called to Dr. Bob  6.  Cannot r/o flu or other underlying cause to exacerbation in addition to her smoking      Carlyle Gordillo MD

## 2020-01-16 NOTE — ED PROVIDER NOTES
Encounter Date: 2020    SCRIBE #1 NOTE: I, Beulah Sanchez, am scribing for, and in the presence of,  Curly Méndez MD. I have scribed the following portions of the note - Other sections scribed: HPI, ROS, PE.       History     Chief Complaint   Patient presents with    Cough     EMS reports flu like symptoms x 3 days. cough, sob, and fever in triage. seen at clinic today and sent to ED for further eval      This is a 49 y.o female who has Asthma, GERD, HTN, Seizures, and Chronic bronchitis presents to the ED for an evaluation for a cough x 2 weeks.  Patient also reports of associated nasal congestion, post nasal drip, nausea, emesis, upper abdominal discomfort, and fatigue.  She reports she has been attempting treatment with a cough suppressant, nebulizing treatments, and her rescue inhaler.  She reports being evaluated by her PCP and states she was advised to come to the ED for further evaluation.  She states during her initial evaluation at her PCP, she received a nebulizing treatment.  Patient denies fever, chills, or any other associated symptoms. No alleviating factors.    The history is provided by the patient.     Review of patient's allergies indicates:  No Known Allergies  Past Medical History:   Diagnosis Date    Allergy     Anemia     Asthma     Back pain     Chronic bronchitis     Cigarette smoker     Depression     Diabetes with neurologic complications     DUB (dysfunctional uterine bleeding) 10/16/2018    GERD (gastroesophageal reflux disease)     High cholesterol     Hyperprolactinemia     Hypertension     Influenza A 2020    Obese body habitus     Pseudotumor cerebri     Renal manifestation of secondary diabetes mellitus     Respiratory failure     Seizures     Simple endometrial hyperplasia 2018    Sleep apnea     Smoker     Tobacco dependence      Past Surgical History:   Procedure Laterality Date    BREAST CYST ASPIRATION       SECTION      X 1     CHOLECYSTECTOMY      COLONOSCOPY      COLONOSCOPY N/A 1/14/2019    Procedure: COLONOSCOPY;  Surgeon: Jagruti Sweeney MD;  Location: [facility] (2ND FLR);  Service: Endoscopy;  Laterality: N/A;  BMI is 63/gastroparesis/3 days full liquid diet 1 day clear liquid see telephone encounter by Ciarasteve Pinto [facility]    ESOPHAGEAL MANOMETRY WITH MEASUREMENT OF IMPEDANCE N/A 11/5/2019    Procedure: MANOMETRY-ESOPHAGEAL-WITH IMPEDANCE;  Surgeon: Jagruti Sweeney MD;  Location: [facility] (2ND FLR);  Service: Endoscopy;  Laterality: N/A;  Hold Narcotics x 1 days   Hold TCA x 1 days  Propofol only. No fentanyl or benzodiazepine during sedation. If additional sedation needed, discuss with Dr. Sweeney.  10/29 - pt confirmed appt    ESOPHAGOGASTRODUODENOSCOPY N/A 1/14/2019    Procedure: EGD (ESOPHAGOGASTRODUODENOSCOPY);  Surgeon: Jagruti Sweeney MD;  Location: [facility] ([address] [place] [address]);  Service: Endoscopy;  Laterality: N/A;  BMI is 63/gastroparesis/3 days full liquid diet 1 day clear liquid see telephone encounter by Ciara Pinto [facility]    ESOPHAGOGASTRODUODENOSCOPY N/A 11/5/2019    Procedure: ESOPHAGOGASTRODUODENOSCOPY (EGD);  Surgeon: Jagruti Sweeney MD;  Location: [facility] ([address] [place] [address]);  Service: Endoscopy;  Laterality: N/A;  EGD with EndoFlip /+/- Botox  2nd floor for gastroparesis/BMI 63 **367lbs**  Bariatric Stretcher needed  Manometry probe to be placed endoscopically during EGD procedure  Due to change in protocol, Full liquid diet for 3 days/ 1 day of clear liquids  Hi    FOOT FRACTURE SURGERY Left     HYSTEROSCOPY WITH DILATION AND CURETTAGE OF UTERUS N/A 10/16/2018    KJB    PLANTAR FASCIOTOMY Left 11/18/2019    Procedure: FASCIOTOMY, PLANTAR;  Surgeon: Valentino Orourke DPM;  Location: [facility] OR [address] [place] [address];  Service: Podiatry;  Laterality: Left;    RETINAL LASER PROCEDURE Left     SINUS SURGERY      UPPER GASTROINTESTINAL ENDOSCOPY       Family History   Problem Relation Age of Onset    Hypertension Mother     Diabetes  Mother     Colon cancer Mother 50    Colon polyps Mother     Heart disease Father     COPD Father     Heart attack Father     Hypertension Father     Ulcers Father     Cancer Maternal Grandmother     Breast cancer Maternal Grandmother     Colon cancer Maternal Grandmother     Colon polyps Maternal Grandmother     No Known Problems Paternal Grandmother     No Known Problems Brother     No Known Problems Maternal Aunt     No Known Problems Maternal Uncle     No Known Problems Paternal Aunt     No Known Problems Paternal Uncle     No Known Problems Maternal Grandfather     No Known Problems Paternal Grandfather     Celiac disease Neg Hx     Cirrhosis Neg Hx     Crohn's disease Neg Hx     Cystic fibrosis Neg Hx     Esophageal cancer Neg Hx     Hemochromatosis Neg Hx     Inflammatory bowel disease Neg Hx     Irritable bowel syndrome Neg Hx     Liver cancer Neg Hx     Liver disease Neg Hx     Rectal cancer Neg Hx     Stomach cancer Neg Hx     Ulcerative colitis Neg Hx     Jonnie's disease Neg Hx     Tuberculosis Neg Hx     Lymphoma Neg Hx     Scleroderma Neg Hx     Rheum arthritis Neg Hx     Melanoma Neg Hx     Multiple sclerosis Neg Hx     Psoriasis Neg Hx     Lupus Neg Hx     Skin cancer Neg Hx     Amblyopia Neg Hx     Blindness Neg Hx     Cataracts Neg Hx     Glaucoma Neg Hx     Macular degeneration Neg Hx     Retinal detachment Neg Hx     Strabismus Neg Hx     Stroke Neg Hx     Thyroid disease Neg Hx      Social History     Tobacco Use    Smoking status: Current Every Day Smoker     Packs/day: 1.00     Years: 27.00     Pack years: 27.00     Types: Cigarettes     Start date: 1/1/1992    Smokeless tobacco: Never Used    Tobacco comment: 1/2 a pack if her days are good   Substance Use Topics    Alcohol use: Yes     Alcohol/week: 0.0 standard drinks     Comment: socially    Drug use: No     Review of Systems   Constitutional: Positive for fatigue.   HENT: Positive  for congestion and postnasal drip.    Eyes: Negative.    Respiratory: Positive for cough.    Cardiovascular: Negative.    Gastrointestinal: Positive for abdominal pain, nausea and vomiting.   Genitourinary: Negative.    Musculoskeletal: Negative.    Skin: Negative.    Neurological: Negative.        Physical Exam     Initial Vitals [01/16/20 1211]   BP Pulse Resp Temp SpO2   137/64 104 (!) 24 (!) 100.7 °F (38.2 °C) 97 %      MAP       --         Physical Exam    Constitutional: She appears well-developed and well-nourished. She is not diaphoretic. No distress.   HENT:   Head: Normocephalic and atraumatic.   Nose: Nose normal.   Eyes: EOM are normal. Pupils are equal, round, and reactive to light.   Neck: Normal range of motion. Neck supple. No JVD present.   Cardiovascular: Normal rate, regular rhythm and normal heart sounds.   No murmur heard.  Pulmonary/Chest: No stridor. No respiratory distress. She has wheezes. She has no rales.   Patient has wheezes to bilateral lower lung fields.   Abdominal: Soft. Bowel sounds are normal. She exhibits no distension. There is tenderness.   Patient has mild epigastric tenderness.   Musculoskeletal: Normal range of motion. She exhibits no edema or tenderness.   Neurological: She is alert and oriented to person, place, and time. No cranial nerve deficit.   Skin: Skin is warm and dry. Capillary refill takes less than 2 seconds. No rash noted. No erythema.         ED Course   Procedures  Labs Reviewed   CBC W/ AUTO DIFFERENTIAL - Abnormal; Notable for the following components:       Result Value    Mean Corpuscular Hemoglobin Conc 31.6 (*)     RDW 15.8 (*)     MPV 8.6 (*)     Lymph # 0.5 (*)     Gran% 79.0 (*)     Lymph% 8.6 (*)     All other components within normal limits   COMPREHENSIVE METABOLIC PANEL - Abnormal; Notable for the following components:    CO2 20 (*)     Creatinine 1.8 (*)     Albumin 3.4 (*)     Alkaline Phosphatase 150 (*)      (*)      (*)     eGFR  if  38 (*)     eGFR if non  33 (*)     All other components within normal limits   POCT INFLUENZA A/B MOLECULAR - Abnormal; Notable for the following components:    POC Molecular Influenza A Ag Positive (*)     POC Molecular Influenza B Ag Negative (*)     All other components within normal limits   ISTAT PROCEDURE - Abnormal; Notable for the following components:    POC PH 7.273 (*)     POC PCO2 48.5 (*)     POC PO2 17 (*)     POC HCO3 22.4 (*)     POC SATURATED O2 19 (*)     All other components within normal limits   LACTIC ACID, PLASMA   LIPASE   B-TYPE NATRIURETIC PEPTIDE   TROPONIN I   POCT GLUCOSE MONITORING CONTINUOUS        ECG Results          EKG 12-lead (Final result)  Result time 01/16/20 20:35:06    Final result by Interface, Lab In [facility] (01/16/20 20:35:06)                 Narrative:    Test Reason : R05,    Vent. Rate : 101 BPM     Atrial Rate : 101 BPM     P-R Int : 144 ms          QRS Dur : 076 ms      QT Int : 308 ms       P-R-T Axes : 036 028 027 degrees     QTc Int : 399 ms    Sinus tachycardia  Otherwise normal ECG  When compared with ECG of 01-NOV-2019 09:50,  Significant changes have occurred  Confirmed by Micah JUNIOR, Moi PADILLA ([id number]) on 1/16/2020 8:35:01 PM    Referred By: [email or web address]   SELF           Confirmed By:Moi Obrien MD                            Imaging Results          X-Ray Chest AP Portable (Final result)  Result time 01/16/20 15:00:12    Final result by Gordo Brenner MD (01/16/20 15:00:12)                 Impression:      See above      Electronically signed by: Gordo Brenner MD  Date:    01/16/2020  Time:    15:00             Narrative:    EXAMINATION:  XR CHEST AP PORTABLE    CLINICAL HISTORY:  Cough    TECHNIQUE:  Single frontal view of the chest was performed.    COMPARISON:  N 03/28/2019 one    FINDINGS:  Heart size normal.  Small subsegmental atelectatic changes noted at the right lung base.  Otherwise the lungs are clear.  No pleural  effusion                                            Scribe Attestation:   Scribe #1: I performed the above scribed service and the documentation accurately describes the services I performed. I attest to the accuracy of the note.      Pt presentation consistent with COPD exacerbation, Influenza A positive with concerns for post-viral pneumonia.  Pt noted to be tachycardic, tachypneic, and febrile.  Given IVF bolus, tylenol, albuterol neb, trial of Bipap, however patient not able to tolerate it.  Started on tamiflu, azithromycin/rocephin after blood cultures taken.  Pt noting to be improved upon reassessment, and discussed findings and plan for further treatment.  Discussed with inpatient team, and patient transferred in stable and improved condition to the floor.                        Clinical Impression:       ICD-10-CM ICD-9-CM   1. Influenza A J10.1 487.1   2. Cough R05 786.2   3. COPD exacerbation J44.1 491.21              I, Curly Méndez M.D., personally performed the services described in this documentation. All medical record entries made by the scribe were at my direction and in my presence. I have reviewed the chart and agree that the record reflects my personal performance and is accurate and complete.               Curly Méndez MD  01/17/20 1220

## 2020-01-16 NOTE — ED TRIAGE NOTES
"Pt presents to the ED via EMS reporting a cough x 3 weeks along with flu-like symptoms. Pt reports n/v/d along with epigastric abdominal pain and pt reports she "can't keep anything down." Pt also reporting chest pain along with SOB. Pt was given duo-neb tx PTA. Also reporting generalized weakness along with fevers and chills. Pt AAOx4.  "

## 2020-01-16 NOTE — SUBJECTIVE & OBJECTIVE
Past Medical History:   Diagnosis Date    Allergy     Anemia     Asthma     Back pain     Chronic bronchitis     Cigarette smoker     Depression     Diabetes with neurologic complications     DUB (dysfunctional uterine bleeding) 10/16/2018    GERD (gastroesophageal reflux disease)     High cholesterol     Hyperprolactinemia     Hypertension     Influenza A 2020    Obese body habitus     Pseudotumor cerebri     Renal manifestation of secondary diabetes mellitus     Respiratory failure     Seizures     Simple endometrial hyperplasia     Sleep apnea     Smoker     Tobacco dependence        Past Surgical History:   Procedure Laterality Date    BREAST CYST ASPIRATION       SECTION      X 1    CHOLECYSTECTOMY      COLONOSCOPY      COLONOSCOPY N/A 2019    Procedure: COLONOSCOPY;  Surgeon: Jagruti Sweeney MD;  Location: [facility] (2ND [facility]);  Service: Endoscopy;  Laterality: N/A;  BMI is 63/gastroparesis/3 days full liquid diet 1 day clear liquid see telephone encounter by Ciara Brown     ESOPHAGEAL MANOMETRY WITH MEASUREMENT OF IMPEDANCE N/A 2019    Procedure: MANOMETRY-ESOPHAGEAL-WITH IMPEDANCE;  Surgeon: Jagruti Sweeney MD;  Location: [facility] (2[facility]);  Service: Endoscopy;  Laterality: N/A;  Hold Narcotics x 1 days   Hold TCA x 1 days  Propofol only. No fentanyl or benzodiazepine during sedation. If additional sedation needed, discuss with Dr. Sweeney.  10/29 - pt confirmed appt    ESOPHAGOGASTRODUODENOSCOPY N/A 2019    Procedure: EGD (ESOPHAGOGASTRODUODENOSCOPY);  Surgeon: Jagruti Sweeney MD;  Location: [facility] ([facility]);  Service: Endoscopy;  Laterality: N/A;  BMI is 63/gastroparesis/3 days full liquid diet 1 day clear liquid see telephone encounter by Ciara limon    ESOPHAGOGASTRODUODENOSCOPY N/A 2019    Procedure: ESOPHAGOGASTRODUODENOSCOPY (EGD);  Surgeon: Jagruti Sweeney MD;  Location: [facility] (2[facility]);  Service:  Endoscopy;  Laterality: N/A;  EGD with EndoFlip /+/- Botox  2nd floor for gastroparesis/BMI 63 **367lbs**  Bariatric Stretcher needed  Manometry probe to be placed endoscopically during EGD procedure  Due to change in protocol, Full liquid diet for 3 days/ 1 day of clear liquids  Hi    FOOT FRACTURE SURGERY Left     HYSTEROSCOPY WITH DILATION AND CURETTAGE OF UTERUS N/A 10/16/2018    K[facility]    PLANTAR FASCIOTOMY Left 11/18/2019    Procedure: FASCIOTOMY, PLANTAR;  Surgeon: Valentino Orourke DPM;  Location: [facility] OR [address] [place] [address];  Service: Podiatry;  Laterality: Left;    RETINAL LASER PROCEDURE Left     SINUS SURGERY      UPPER GASTROINTESTINAL ENDOSCOPY         Review of patient's allergies indicates:  No Known Allergies    No current facility-administered medications on file prior to encounter.      Current Outpatient Medications on File Prior to Encounter   Medication Sig    acetaZOLAMIDE (DIAMOX) 500 mg CpSR Take 1 capsule (500 mg total) by mouth 2 (two) times daily.    albuterol (PROVENTIL/VENTOLIN HFA) 90 mcg/actuation inhaler Inhale 2 puffs into the lungs every 6 (six) hours as needed. Rescue    albuterol-ipratropium (DUO-NEB) 2.5 mg-0.5 mg/3 mL nebulizer solution Take 3 mLs by nebulization every 6 (six) hours as needed for Wheezing or Shortness of Breath. Rescue    atorvastatin (LIPITOR) 40 MG tablet Take 1 tablet (40 mg total) by mouth once daily.    blood sugar diagnostic Strp 1 each by Misc.(Non-Drug; Combo Route) route 4 (four) times daily before meals and nightly. ACCU CHEK ELVIA PLUS METER    blood-glucose meter (ACCU-CHEK ELVIA PLUS METER) Misc TEST FOUR TIMES DAILY BEFORE MEALS AND EVERY NIGHT    budesonide-formoterol 160-4.5 mcg (SYMBICORT) 160-4.5 mcg/actuation HFAA Inhale 2 puffs into the lungs every 12 (twelve) hours. Controller    diclofenac sodium (VOLTAREN) 1 % Gel Apply 2 g topically once daily.    fluticasone propionate (FLONASE) 50 mcg/actuation nasal spray 1 spray (50 mcg total) by  Each Nare route once daily.    hydrOXYzine (ATARAX) 50 MG tablet     lactulose (CHRONULAC) 10 gram/15 mL solution TAKE 15 MLS BY MOUTH THREE TIMES DAILY AS NEEDED    lancets (ACCU-CHEK SOFTCLIX LANCETS) Misc 1 Device by Misc.(Non-Drug; Combo Route) route 4 (four) times daily.    levocetirizine (XYZAL) 5 MG tablet TAKE 1 TABLET(5 MG) BY MOUTH EVERY EVENING    losartan (COZAAR) 100 MG tablet TAKE 1 TABLET(100 MG) BY MOUTH EVERY DAY    meloxicam (MOBIC) 15 MG tablet TAKE 1 TABLET(15 MG) BY MOUTH DAILY AS NEEDED FOR HEADACHE    metFORMIN (GLUCOPHAGE-XR) 500 MG 24 hr tablet TAKE 2 TABLETS(1000 MG) BY MOUTH TWICE DAILY WITH MEALS    methylPREDNISolone (MEDROL DOSEPACK) 4 mg tablet use as directed    montelukast (SINGULAIR) 10 mg tablet TAKE 1 TABLET(10 MG) BY MOUTH EVERY EVENING    ondansetron (ZOFRAN) 8 MG tablet Take 1 tablet (8 mg total) by mouth every 8 (eight) hours as needed for Nausea.    oxyCODONE-acetaminophen (PERCOCET)  mg per tablet TAKE 1 TABLET BY MOUTH EVERY 8 HOURS AS NEEDED. MAY CAUSE DROWSINESS    oxyCODONE-acetaminophen (PERCOCET) 5-325 mg per tablet Take 1 tablet by mouth every 12 (twelve) hours as needed for Pain.    oxyCODONE-acetaminophen (PERCOCET) 5-325 mg per tablet Take 1 tablet by mouth every 24 hours as needed for Pain.    pantoprazole (PROTONIX) 40 MG tablet Take 40 mg by mouth once daily.     prucalopride 2 mg Tab Take one tablet by mouth once daily.    QUEtiapine (SEROQUEL) 25 MG Tab TAKE 1 TABLET(25 MG) BY MOUTH EVERY EVENING    semaglutide (OZEMPIC) 1 mg/dose (2 mg/1.5 mL) PnIj Inject 1 mg subq weekly.    sumatriptan (IMITREX) 50 MG tablet Take 1 tab at onset of headaches.  If no improvement in 2 hours, take another.  Do not take more than 2 tabs in 24 hours.    topiramate (TOPAMAX) 100 MG tablet TAKE 3 TABLETS BY MOUTH TWICE DAILY    traMADol (ULTRAM-ER) 100 MG Tb24 Take 1 tablet (100 mg total) by mouth daily as needed.    triamcinolone acetonide 0.1% (KENALOG)  0.1 % ointment Apply topically 2 (two) times daily. for 7 days    venlafaxine (EFFEXOR-XR) 75 MG 24 hr capsule TAKE 1 CAPSULE(75 MG) BY MOUTH EVERY EVENING     Family History     Problem Relation (Age of Onset)    Breast cancer Maternal Grandmother    COPD Father    Cancer Maternal Grandmother    Colon cancer Mother (50), Maternal Grandmother    Colon polyps Mother, Maternal Grandmother    Diabetes Mother    Heart attack Father    Heart disease Father    Hypertension Mother, Father    No Known Problems Paternal Grandmother, Brother, Maternal Aunt, Maternal Uncle, Paternal Aunt, Paternal Uncle, Maternal Grandfather, Paternal Grandfather    Ulcers Father        Tobacco Use    Smoking status: Current Every Day Smoker     Packs/day: 1.00     Years: 27.00     Pack years: 27.00     Types: Cigarettes     Start date: 1/1/1992    Smokeless tobacco: Never Used    Tobacco comment: 1/2 a pack if her days are good   Substance and Sexual Activity    Alcohol use: Yes     Alcohol/week: 0.0 standard drinks     Comment: socially    Drug use: No    Sexual activity: Yes     Partners: Male     Birth control/protection: None     Review of Systems   Constitutional: Positive for chills and fatigue.   HENT: Negative for ear discharge and ear pain.    Eyes: Negative for pain and itching.   Respiratory: Positive for cough and shortness of breath.    Cardiovascular: Negative for chest pain and palpitations.   Gastrointestinal: Positive for nausea and vomiting.   Endocrine: Negative for polyphagia and polyuria.   Genitourinary: Negative for difficulty urinating and dysuria.   Musculoskeletal: Positive for arthralgias and myalgias.   Skin: Negative for rash and wound.   Neurological: Negative for seizures and syncope.   Psychiatric/Behavioral: Negative for agitation and hallucinations.     Objective:     Vital Signs (Most Recent):  Temp: 100.1 °F (37.8 °C) (01/16/20 1731)  Pulse: 104 (01/16/20 1731)  Resp: 18 (01/16/20 1731)  BP: 113/65  (01/16/20 1731)  SpO2: 96 % (01/16/20 1731) Vital Signs (24h Range):  Temp:  [100.1 °F (37.8 °C)-102.6 °F (39.2 °C)] 100.1 °F (37.8 °C)  Pulse:  [] 104  Resp:  [17-24] 18  SpO2:  [95 %-100 %] 96 %  BP: (113-146)/(64-96) 113/65     Weight: (!) 155.1 kg (342 lb)  Body mass index is 58.7 kg/m².    Physical Exam   Constitutional: She is oriented to person, place, and time. No distress.   HENT:   Head: Normocephalic and atraumatic.   Eyes: Conjunctivae are normal. Right eye exhibits no discharge. Left eye exhibits no discharge.   Neck: Neck supple. No tracheal deviation present.   Cardiovascular: Normal heart sounds.   Tachycardic   Pulmonary/Chest: She is in respiratory distress.   Tachypnea  Bilateral expiratory wheezing   Abdominal: Soft. Bowel sounds are normal.   Musculoskeletal: Normal range of motion. She exhibits no deformity.   Neurological: She is alert and oriented to person, place, and time.   Skin: Skin is warm and dry. She is not diaphoretic.           Significant Labs:   BMP:   Recent Labs   Lab 01/16/20  1242   GLU 75      K 3.5      CO2 20*   BUN 17   CREATININE 1.8*   CALCIUM 9.5     CBC:   Recent Labs   Lab 01/16/20  1242   WBC 5.35   HGB 13.6   HCT 43.1          Significant Imaging: I have reviewed all pertinent imaging results/findings within the past 24 hours.

## 2020-01-17 PROBLEM — J45.901 MODERATE ASTHMA WITH ACUTE EXACERBATION: Status: ACTIVE | Noted: 2020-01-17

## 2020-01-17 LAB
ALBUMIN SERPL BCP-MCNC: 2.9 G/DL (ref 3.5–5.2)
ALP SERPL-CCNC: 130 U/L (ref 55–135)
ALT SERPL W/O P-5'-P-CCNC: 158 U/L (ref 10–44)
ANION GAP SERPL CALC-SCNC: 6 MMOL/L (ref 8–16)
AST SERPL-CCNC: 69 U/L (ref 10–40)
BASOPHILS # BLD AUTO: 0 K/UL (ref 0–0.2)
BASOPHILS NFR BLD: 0 % (ref 0–1.9)
BILIRUB SERPL-MCNC: 0.2 MG/DL (ref 0.1–1)
BUN SERPL-MCNC: 17 MG/DL (ref 6–20)
CALCIUM SERPL-MCNC: 8.7 MG/DL (ref 8.7–10.5)
CHLORIDE SERPL-SCNC: 110 MMOL/L (ref 95–110)
CO2 SERPL-SCNC: 22 MMOL/L (ref 23–29)
CREAT SERPL-MCNC: 1.3 MG/DL (ref 0.5–1.4)
DIFFERENTIAL METHOD: ABNORMAL
EOSINOPHIL # BLD AUTO: 0 K/UL (ref 0–0.5)
EOSINOPHIL NFR BLD: 0 % (ref 0–8)
ERYTHROCYTE [DISTWIDTH] IN BLOOD BY AUTOMATED COUNT: 15.7 % (ref 11.5–14.5)
EST. GFR  (AFRICAN AMERICAN): 56 ML/MIN/1.73 M^2
EST. GFR  (NON AFRICAN AMERICAN): 49 ML/MIN/1.73 M^2
GLUCOSE SERPL-MCNC: 136 MG/DL (ref 70–110)
HCT VFR BLD AUTO: 40.7 % (ref 37–48.5)
HGB BLD-MCNC: 12.8 G/DL (ref 12–16)
IMM GRANULOCYTES # BLD AUTO: 0.01 K/UL (ref 0–0.04)
IMM GRANULOCYTES NFR BLD AUTO: 0.3 % (ref 0–0.5)
LYMPHOCYTES # BLD AUTO: 0.4 K/UL (ref 1–4.8)
LYMPHOCYTES NFR BLD: 11.8 % (ref 18–48)
MCH RBC QN AUTO: 29.6 PG (ref 27–31)
MCHC RBC AUTO-ENTMCNC: 31.4 G/DL (ref 32–36)
MCV RBC AUTO: 94 FL (ref 82–98)
MONOCYTES # BLD AUTO: 0.1 K/UL (ref 0.3–1)
MONOCYTES NFR BLD: 4.2 % (ref 4–15)
NEUTROPHILS # BLD AUTO: 2.8 K/UL (ref 1.8–7.7)
NEUTROPHILS NFR BLD: 83.7 % (ref 38–73)
NRBC BLD-RTO: 0 /100 WBC
PLATELET # BLD AUTO: 231 K/UL (ref 150–350)
PMV BLD AUTO: 8.9 FL (ref 9.2–12.9)
POCT GLUCOSE: 128 MG/DL (ref 70–110)
POCT GLUCOSE: 160 MG/DL (ref 70–110)
POCT GLUCOSE: 161 MG/DL (ref 70–110)
POTASSIUM SERPL-SCNC: 4.1 MMOL/L (ref 3.5–5.1)
PROT SERPL-MCNC: 6.9 G/DL (ref 6–8.4)
RBC # BLD AUTO: 4.33 M/UL (ref 4–5.4)
SODIUM SERPL-SCNC: 138 MMOL/L (ref 136–145)
TROPONIN I SERPL DL<=0.01 NG/ML-MCNC: <0.006 NG/ML (ref 0–0.03)
WBC # BLD AUTO: 3.31 K/UL (ref 3.9–12.7)

## 2020-01-17 PROCEDURE — 85025 COMPLETE CBC W/AUTO DIFF WBC: CPT | Mod: HCNC

## 2020-01-17 PROCEDURE — 25000003 PHARM REV CODE 250: Mod: HCNC | Performed by: HOSPITALIST

## 2020-01-17 PROCEDURE — 11000001 HC ACUTE MED/SURG PRIVATE ROOM: Mod: HCNC

## 2020-01-17 PROCEDURE — 36415 COLL VENOUS BLD VENIPUNCTURE: CPT | Mod: HCNC

## 2020-01-17 PROCEDURE — 94761 N-INVAS EAR/PLS OXIMETRY MLT: CPT | Mod: HCNC

## 2020-01-17 PROCEDURE — 84484 ASSAY OF TROPONIN QUANT: CPT | Mod: HCNC

## 2020-01-17 PROCEDURE — 94640 AIRWAY INHALATION TREATMENT: CPT | Mod: HCNC

## 2020-01-17 PROCEDURE — 80053 COMPREHEN METABOLIC PANEL: CPT | Mod: HCNC

## 2020-01-17 PROCEDURE — 63600175 PHARM REV CODE 636 W HCPCS: Mod: HCNC | Performed by: HOSPITALIST

## 2020-01-17 PROCEDURE — 25000242 PHARM REV CODE 250 ALT 637 W/ HCPCS: Mod: HCNC | Performed by: HOSPITALIST

## 2020-01-17 PROCEDURE — 25000242 PHARM REV CODE 250 ALT 637 W/ HCPCS: Mod: HCNC | Performed by: EMERGENCY MEDICINE

## 2020-01-17 PROCEDURE — 25000003 PHARM REV CODE 250: Mod: HCNC | Performed by: EMERGENCY MEDICINE

## 2020-01-17 RX ORDER — OSELTAMIVIR PHOSPHATE 75 MG/1
75 CAPSULE ORAL 2 TIMES DAILY
Status: DISCONTINUED | OUTPATIENT
Start: 2020-01-17 | End: 2020-01-18 | Stop reason: HOSPADM

## 2020-01-17 RX ADMIN — METHYLPREDNISOLONE SODIUM SUCCINATE 80 MG: 125 INJECTION, POWDER, FOR SOLUTION INTRAMUSCULAR; INTRAVENOUS at 09:01

## 2020-01-17 RX ADMIN — METHYLPREDNISOLONE SODIUM SUCCINATE 80 MG: 125 INJECTION, POWDER, FOR SOLUTION INTRAMUSCULAR; INTRAVENOUS at 12:01

## 2020-01-17 RX ADMIN — CETIRIZINE HYDROCHLORIDE 5 MG: 5 TABLET, FILM COATED ORAL at 09:01

## 2020-01-17 RX ADMIN — IPRATROPIUM BROMIDE AND ALBUTEROL SULFATE 3 ML: .5; 3 SOLUTION RESPIRATORY (INHALATION) at 11:01

## 2020-01-17 RX ADMIN — GUAIFENESIN AND DEXTROMETHORPHAN 5 ML: 100; 10 SYRUP ORAL at 06:01

## 2020-01-17 RX ADMIN — LEVOFLOXACIN 750 MG: 750 INJECTION, SOLUTION INTRAVENOUS at 05:01

## 2020-01-17 RX ADMIN — IPRATROPIUM BROMIDE AND ALBUTEROL SULFATE 3 ML: .5; 3 SOLUTION RESPIRATORY (INHALATION) at 02:01

## 2020-01-17 RX ADMIN — ENOXAPARIN SODIUM 40 MG: 100 INJECTION SUBCUTANEOUS at 05:01

## 2020-01-17 RX ADMIN — FLUTICASONE FUROATE AND VILANTEROL TRIFENATATE 1 PUFF: 100; 25 POWDER RESPIRATORY (INHALATION) at 08:01

## 2020-01-17 RX ADMIN — LOSARTAN POTASSIUM 100 MG: 25 TABLET ORAL at 09:01

## 2020-01-17 RX ADMIN — GUAIFENESIN AND DEXTROMETHORPHAN 5 ML: 100; 10 SYRUP ORAL at 05:01

## 2020-01-17 RX ADMIN — IPRATROPIUM BROMIDE AND ALBUTEROL SULFATE 3 ML: .5; 3 SOLUTION RESPIRATORY (INHALATION) at 07:01

## 2020-01-17 RX ADMIN — QUETIAPINE FUMARATE 25 MG: 25 TABLET ORAL at 08:01

## 2020-01-17 RX ADMIN — OSELTAMIVIR PHOSPHATE 30 MG: 30 CAPSULE ORAL at 12:01

## 2020-01-17 RX ADMIN — TOPIRAMATE 300 MG: 100 TABLET, FILM COATED ORAL at 09:01

## 2020-01-17 RX ADMIN — ACETAZOLAMIDE 500 MG: 500 CAPSULE, EXTENDED RELEASE ORAL at 09:01

## 2020-01-17 RX ADMIN — ATORVASTATIN CALCIUM 40 MG: 40 TABLET, FILM COATED ORAL at 09:01

## 2020-01-17 RX ADMIN — PANTOPRAZOLE SODIUM 40 MG: 40 TABLET, DELAYED RELEASE ORAL at 09:01

## 2020-01-17 RX ADMIN — IPRATROPIUM BROMIDE AND ALBUTEROL SULFATE 3 ML: .5; 3 SOLUTION RESPIRATORY (INHALATION) at 08:01

## 2020-01-17 RX ADMIN — GUAIFENESIN AND DEXTROMETHORPHAN 5 ML: 100; 10 SYRUP ORAL at 12:01

## 2020-01-17 RX ADMIN — METHYLPREDNISOLONE SODIUM SUCCINATE 80 MG: 125 INJECTION, POWDER, FOR SOLUTION INTRAMUSCULAR; INTRAVENOUS at 05:01

## 2020-01-17 RX ADMIN — OXYCODONE HYDROCHLORIDE AND ACETAMINOPHEN 1 TABLET: 5; 325 TABLET ORAL at 06:01

## 2020-01-17 RX ADMIN — ACETAZOLAMIDE 500 MG: 500 CAPSULE, EXTENDED RELEASE ORAL at 12:01

## 2020-01-17 RX ADMIN — OSELTAMIVIR PHOSPHATE 30 MG: 30 CAPSULE ORAL at 09:01

## 2020-01-17 RX ADMIN — OSELTAMIVIR PHOSPHATE 75 MG: 75 CAPSULE ORAL at 08:01

## 2020-01-17 RX ADMIN — METHYLPREDNISOLONE SODIUM SUCCINATE 80 MG: 125 INJECTION, POWDER, FOR SOLUTION INTRAMUSCULAR; INTRAVENOUS at 01:01

## 2020-01-17 RX ADMIN — TOPIRAMATE 300 MG: 100 TABLET, FILM COATED ORAL at 08:01

## 2020-01-17 NOTE — ASSESSMENT & PLAN NOTE
Patient presents in acute respiratory distress.  Met sepsis criteria on presentation with fever, tachycardia and tachypnea.  Symptoms probably combination of moderate intermittent asthma with acute exacerbation and Influenza A.  Provide nebs, oxygen, IV steroids and Levaquin.  Tamiflu for influenza.  Anti tussives and symptomatic care.

## 2020-01-17 NOTE — H&P
"[facility] Medicine  History & Physical    Patient Name: Yanni Kaiser  MRN: [id number]  Admission Date: 1/16/2020  Attending Physician: Wei Alford MD   Primary Care Provider: Carlyle Gordillo MD         Patient information was obtained from patient and ER records.     Subjective:     Principal Problem:Acute respiratory distress    Chief Complaint:   Chief Complaint   Patient presents with    Cough     EMS reports flu like symptoms x 3 days. cough, sob, and fever in triage. seen at clinic today and sent to ED for further eval         HPI: 48 y/o female presents with shortness of breath.  Patient complaining of 3 days of fatigue, nausea, vomiting, diarrhea and shortness of breath.  Patient is a known asthmatic who does continue to smoke despite being admitted to ICU more than once for exacerbations.  She states the last time she smoked was "a couple days ago".  She has been having vomiting and diarrhea multiple times a day for 3 days.  She has been using her rescue inhaler multiple times with no improvement.  She feels tight and hears herself wheezing.   Also complaining of a productive cough.  She has been feeling hot and cold, but has not checked her temperature. She does have body aches as well.  She presented to ER where she was noted to be febrile and tachycardic.  No other complaints.    Past Medical History:   Diagnosis Date    Allergy     Anemia     Asthma     Back pain     Chronic bronchitis     Cigarette smoker     Depression     Diabetes with neurologic complications     DUB (dysfunctional uterine bleeding) 10/16/2018    GERD (gastroesophageal reflux disease)     High cholesterol     Hyperprolactinemia     Hypertension     Influenza A 01/16/2020    Obese body habitus     Pseudotumor cerebri     Renal manifestation of secondary diabetes mellitus     Respiratory failure     Seizures     Simple endometrial hyperplasia 2018    Sleep apnea     " Smoker     Tobacco dependence        Past Surgical History:   Procedure Laterality Date    BREAST CYST ASPIRATION       SECTION      X 1    CHOLECYSTECTOMY      COLONOSCOPY      COLONOSCOPY N/A 2019    Procedure: COLONOSCOPY;  Surgeon: Jagruti Sweeney MD;  [facility]  Service: Endoscopy;  Laterality: N/A;  BMI is 63/gastroparesis/3 days full liquid diet 1 day clear liquid see telephone encounter by [facility]    ESOPHAGEAL MANOMETRY WITH MEASUREMENT OF IMPEDANCE N/A 2019    Procedure: MANOMETRY-ESOPHAGEAL-WITH IMPEDANCE;  Surgeon: Jagruti Sweeney MD;  Location: [facility] ([facility]  Service: Endoscopy;  Laterality: N/A;  Hold Narcotics x 1 days   Hold TCA x 1 days  Propofol only. No fentanyl or benzodiazepine during sedation. If additional sedation needed, discuss with Dr. Sweeney.  10/29 - pt confirmed appt    ESOPHAGOGASTRODUODENOSCOPY N/A 2019    Procedure: EGD (ESOPHAGOGASTRODUODENOSCOPY);  Surgeon: Jagruti Sweeney MD;  Location: [facility] ([address] [place] [address]);  Service: Endoscopy;  Laterality: N/A;  BMI is 63/gastroparesis/3 days full liquid diet 1 day clear liquid see telephone encounter by [facility]    ESOPHAGOGASTRODUODENOSCOPY N/A 2019    Procedure: ESOPHAGOGASTRODUODENOSCOPY (EGD);  Surgeon: Jagruti Sweeney MD;  Location: [facility] ([address] [place] [address]);  Service: Endoscopy;  Laterality: N/A;  EGD with EndoFlip /+/- Botox  2nd floor for gastroparesis/BMI 63 **367lbs**  Bariatric Stretcher needed  Manometry probe to be placed endoscopically during EGD procedure  Due to change in protocol, Full liquid diet for 3 days/ 1 day of clear liquids  Hi    FOOT FRACTURE SURGERY Left     HYSTEROSCOPY WITH DILATION AND CURETTAGE OF UTERUS N/A 10/16/2018    KJB    PLANTAR FASCIOTOMY Left 2019    Procedure: FASCIOTOMY, PLANTAR;  Surgeon: Valentino Orourke DPM;  Location: [facility] OR [address] [place] [address];  Service: Podiatry;  Laterality: Left;    RETINAL LASER PROCEDURE  Left     SINUS SURGERY      UPPER GASTROINTESTINAL ENDOSCOPY         Review of patient's allergies indicates:  No Known Allergies    No current facility-administered medications on file prior to encounter.      Current Outpatient Medications on File Prior to Encounter   Medication Sig    acetaZOLAMIDE (DIAMOX) 500 mg CpSR Take 1 capsule (500 mg total) by mouth 2 (two) times daily.    albuterol (PROVENTIL/VENTOLIN HFA) 90 mcg/actuation inhaler Inhale 2 puffs into the lungs every 6 (six) hours as needed. Rescue    albuterol-ipratropium (DUO-NEB) 2.5 mg-0.5 mg/3 mL nebulizer solution Take 3 mLs by nebulization every 6 (six) hours as needed for Wheezing or Shortness of Breath. Rescue    atorvastatin (LIPITOR) 40 MG tablet Take 1 tablet (40 mg total) by mouth once daily.    blood sugar diagnostic Strp 1 each by Misc.(Non-Drug; Combo Route) route 4 (four) times daily before meals and nightly. ACCU CHEK ELVIA PLUS METER    blood-glucose meter (ACCU-CHEK ELVIA PLUS METER) Misc TEST FOUR TIMES DAILY BEFORE MEALS AND EVERY NIGHT    budesonide-formoterol 160-4.5 mcg (SYMBICORT) 160-4.5 mcg/actuation HFAA Inhale 2 puffs into the lungs every 12 (twelve) hours. Controller    diclofenac sodium (VOLTAREN) 1 % Gel Apply 2 g topically once daily.    fluticasone propionate (FLONASE) 50 mcg/actuation nasal spray 1 spray (50 mcg total) by Each Nare route once daily.    hydrOXYzine (ATARAX) 50 MG tablet     lactulose (CHRONULAC) 10 gram/15 mL solution TAKE 15 MLS BY MOUTH THREE TIMES DAILY AS NEEDED    lancets (ACCU-CHEK SOFTCLIX LANCETS) Misc 1 Device by Misc.(Non-Drug; Combo Route) route 4 (four) times daily.    levocetirizine (XYZAL) 5 MG tablet TAKE 1 TABLET(5 MG) BY MOUTH EVERY EVENING    losartan (COZAAR) 100 MG tablet TAKE 1 TABLET(100 MG) BY MOUTH EVERY DAY    meloxicam (MOBIC) 15 MG tablet TAKE 1 TABLET(15 MG) BY MOUTH DAILY AS NEEDED FOR HEADACHE    metFORMIN (GLUCOPHAGE-XR) 500 MG 24 hr tablet TAKE 2  TABLETS(1000 MG) BY MOUTH TWICE DAILY WITH MEALS    methylPREDNISolone (MEDROL DOSEPACK) 4 mg tablet use as directed    montelukast (SINGULAIR) 10 mg tablet TAKE 1 TABLET(10 MG) BY MOUTH EVERY EVENING    ondansetron (ZOFRAN) 8 MG tablet Take 1 tablet (8 mg total) by mouth every 8 (eight) hours as needed for Nausea.    oxyCODONE-acetaminophen (PERCOCET)  mg per tablet TAKE 1 TABLET BY MOUTH EVERY 8 HOURS AS NEEDED. MAY CAUSE DROWSINESS    oxyCODONE-acetaminophen (PERCOCET) 5-325 mg per tablet Take 1 tablet by mouth every 12 (twelve) hours as needed for Pain.    oxyCODONE-acetaminophen (PERCOCET) 5-325 mg per tablet Take 1 tablet by mouth every 24 hours as needed for Pain.    pantoprazole (PROTONIX) 40 MG tablet Take 40 mg by mouth once daily.     prucalopride 2 mg Tab Take one tablet by mouth once daily.    QUEtiapine (SEROQUEL) 25 MG Tab TAKE 1 TABLET(25 MG) BY MOUTH EVERY EVENING    semaglutide (OZEMPIC) 1 mg/dose (2 mg/1.5 mL) PnIj Inject 1 mg subq weekly.    sumatriptan (IMITREX) 50 MG tablet Take 1 tab at onset of headaches.  If no improvement in 2 hours, take another.  Do not take more than 2 tabs in 24 hours.    topiramate (TOPAMAX) 100 MG tablet TAKE 3 TABLETS BY MOUTH TWICE DAILY    traMADol (ULTRAM-ER) 100 MG Tb24 Take 1 tablet (100 mg total) by mouth daily as needed.    triamcinolone acetonide 0.1% (KENALOG) 0.1 % ointment Apply topically 2 (two) times daily. for 7 days    venlafaxine (EFFEXOR-XR) 75 MG 24 hr capsule TAKE 1 CAPSULE(75 MG) BY MOUTH EVERY EVENING     Family History     Problem Relation (Age of Onset)    Breast cancer Maternal Grandmother    COPD Father    Cancer Maternal Grandmother    Colon cancer Mother (50), Maternal Grandmother    Colon polyps Mother, Maternal Grandmother    Diabetes Mother    Heart attack Father    Heart disease Father    Hypertension Mother, Father    No Known Problems Paternal Grandmother, Brother, Maternal Aunt, Maternal Uncle, Paternal Aunt,  Paternal Uncle, Maternal Grandfather, Paternal Grandfather    Ulcers Father        Tobacco Use    Smoking status: Current Every Day Smoker     Packs/day: 1.00     Years: 27.00     Pack years: 27.00     Types: Cigarettes     Start date: 1/1/1992    Smokeless tobacco: Never Used    Tobacco comment: 1/2 a pack if her days are good   Substance and Sexual Activity    Alcohol use: Yes     Alcohol/week: 0.0 standard drinks     Comment: socially    Drug use: No    Sexual activity: Yes     Partners: Male     Birth control/protection: None     Review of Systems   Constitutional: Positive for chills and fatigue.   HENT: Negative for ear discharge and ear pain.    Eyes: Negative for pain and itching.   Respiratory: Positive for cough and shortness of breath.    Cardiovascular: Negative for chest pain and palpitations.   Gastrointestinal: Positive for nausea and vomiting.   Endocrine: Negative for polyphagia and polyuria.   Genitourinary: Negative for difficulty urinating and dysuria.   Musculoskeletal: Positive for arthralgias and myalgias.   Skin: Negative for rash and wound.   Neurological: Negative for seizures and syncope.   Psychiatric/Behavioral: Negative for agitation and hallucinations.     Objective:     Vital Signs (Most Recent):  Temp: 100.1 °F (37.8 °C) (01/16/20 1731)  Pulse: 104 (01/16/20 1731)  Resp: 18 (01/16/20 1731)  BP: 113/65 (01/16/20 1731)  SpO2: 96 % (01/16/20 1731) Vital Signs (24h Range):  Temp:  [100.1 °F (37.8 °C)-102.6 °F (39.2 °C)] 100.1 °F (37.8 °C)  Pulse:  [] 104  Resp:  [17-24] 18  SpO2:  [95 %-100 %] 96 %  BP: (113-146)/(64-96) 113/65     Weight: (!) 155.1 kg (342 lb)  Body mass index is 58.7 kg/m².    Physical Exam   Constitutional: She is oriented to person, place, and time. No distress.   HENT:   Head: Normocephalic and atraumatic.   Eyes: Conjunctivae are normal. Right eye exhibits no discharge. Left eye exhibits no discharge.   Neck: Neck supple. No tracheal deviation present.    Cardiovascular: Normal heart sounds.   Tachycardic   Pulmonary/Chest: She is in respiratory distress.   Tachypnea  Bilateral expiratory wheezing   Abdominal: Soft. Bowel sounds are normal.   Musculoskeletal: Normal range of motion. She exhibits no deformity.   Neurological: She is alert and oriented to person, place, and time.   Skin: Skin is warm and dry. She is not diaphoretic.           Significant Labs:   BMP:   Recent Labs   Lab 01/16/20  1242   GLU 75      K 3.5      CO2 20*   BUN 17   CREATININE 1.8*   CALCIUM 9.5     CBC:   Recent Labs   Lab 01/16/20  1242   WBC 5.35   HGB 13.6   HCT 43.1          Significant Imaging: I have reviewed all pertinent imaging results/findings within the past 24 hours.    Assessment/Plan:     * Acute respiratory distress  Patient presents in acute respiratory distress.  Met sepsis criteria on presentation with fever, tachycardia and tachypnea.  Symptoms probably combination of moderate intermittent asthma with acute exacerbation and Influenza A.  Provide nebs, oxygen, IV steroids and Levaquin.  Tamiflu for influenza.  Anti tussives and symptomatic care.        Stage 3 chronic kidney disease  Creat slightly higher than baseline.  Avoid nephrotoxic medications.      Influenza A  Start Tamiflu    Type 2 diabetes mellitus with stage 3 chronic kidney disease, with long-term current use of insulin  Hold home Metformin.  Glucose may worsen while on steroids.  Diabetic diet and insulin sliding scale.      Hyperlipidemia  Continue home Statin.      Essential hypertension  Resume home Losartan.  Low Na diet.      Idiopathic intracranial hypertension  Resume home Acetazolamide.       Anemia of chronic disease  H/H similar to previous labs.  No evidence of bleeding.    Cigarette nicotine dependence without complication  Spent more than 3 minutes on smoking cessation counseling.      Morbid obesity with BMI of 50.0-59.9, adult          VTE Risk Mitigation (From admission,  onward)         Ordered     IP VTE HIGH RISK PATIENT  Once      01/16/20 1729     Place sequential compression device  Until discontinued      01/16/20 1729                   Wei Alford MD  Department of Hospital Medicine   [facility]

## 2020-01-17 NOTE — HOSPITAL COURSE
"50 y/o female presents with shortness of breath.  Patient complaining of 3 days of fatigue, nausea, vomiting, diarrhea and shortness of breath.  Patient is a known asthmatic who does continue to smoke despite being admitted to ICU more than once for exacerbations.  She states the last time she smoked was "a couple days ago".  She has been having vomiting and diarrhea multiple times a day for 3 days.  She has been using her rescue inhaler multiple times with no improvement.  She feels tight and hears herself wheezing.  Also complaining of a productive cough.  She has been feeling hot and cold, but has not checked her temperature. She does have body aches as well.  She presented to ER where she was noted to be febrile and tachycardic.she is on nebulizer and IV solumedrol for Asthma exacerbation,also on Tamiflu for influenza,her respiratody symtoms resolved,at DC time she was stable on RA,she has been discharged home with Tamiflu and PO Abx and follow up with PCP in next few days.  "

## 2020-01-17 NOTE — PLAN OF CARE
"SW met with patient to complete discharge needs assessment. SW reviewed with patient contents of "[facility]" including "help at home", "things patient responsible for to manage her health at home" and "preferences". Patient was able to verbalize her help at home is her spouse Mathieu. PHOEBE discussed with patient the things she will be responsible for to manage her health at home would be going on doctor appointments, taking medications as prescribed, and getting prescriptions filled. SW wrote name and phone number on white communication board. Patient prefers morning doctor appointments.          [id number] - [place], [address] AT [facility] OF [facility] & [address]  [place] [address]  Phone: [phone] Fax: [phone]    [facility] - [facility] [address] [facility]  [address] [facility] [address]  Phone: [phone] Fax: [phone]    [facility] - [place] - [address]  [address]  [place] [address]  Phone: [phone] Fax: [phone]    [facility] [place] - [place] - [address]  [address]  [place] [address]  Phone: [phone] Fax: [phone]         01/17/20 1456   Discharge Assessment   Assessment Type Discharge Planning Assessment   Confirmed/corrected address and phone number on facesheet? Yes   Assessment information obtained from? Patient   Communicated expected length of stay with patient/caregiver no   Prior to hospitilization cognitive status: Alert/Oriented;No Deficits   Prior to hospitalization functional status: Independent   Current cognitive status: Alert/Oriented;No Deficits   Current Functional Status: Independent   Facility Arrived From:   (Home)   Lives With spouse   Able to Return to Prior Arrangements yes   Is patient able to care for self after discharge? Yes   Who are your caregiver(s) and their phone number(s)?   (Mathieu (spouse) " [phone])   Patient's perception of discharge disposition home or selfcare   Readmission Within the Last 30 Days no previous admission in last 30 days   Patient currently being followed by outpatient case management? No   Patient currently receives any other outside agency services? No   Equipment Currently Used at Home glucometer;nebulizer   Do you have any problems affording any of your prescribed medications? No   Is the patient taking medications as prescribed? yes   Does the patient have transportation home? Yes   Transportation Anticipated car, drives self;family or friend will provide   Dialysis Name and Scheduled days   (N/A)   Does the patient receive services at the [facility]madin Clinic? No   Discharge Plan A Home with family  (PCP F/U)   Discharge Plan B Home with family   DME Needed Upon Discharge  none   Patient/Family in Agreement with Plan yes   Does the patient have transportation to healthcare appointments? Yes

## 2020-01-17 NOTE — PROGRESS NOTES
Pharmacist Renal Dose Adjustment Note    Yanni Kaiser is a 49 y.o. female being treated with the medication Lovenox    Patient Data:    Vital Signs (Most Recent):  Temp: 100.1 °F (37.8 °C) (01/16/20 1731)  Pulse: 104 (01/16/20 1731)  Resp: 18 (01/16/20 1731)  BP: 113/65 (01/16/20 1731)  SpO2: 96 % (01/16/20 1731) Vital Signs (72h Range):  Temp:  [100.1 °F (37.8 °C)-102.6 °F (39.2 °C)]   Pulse:  []   Resp:  [17-24]   BP: (113-146)/(64-96)   SpO2:  [95 %-100 %]      Recent Labs   Lab 01/16/20  1242   CREATININE 1.8*     Serum creatinine: 1.8 mg/dL (H) 01/16/20 1242  Estimated creatinine clearance: 57.9 mL/min (A)    Medication:Lovenox dose: 30 mg frequency q24h will be changed to medication:Lovenox dose:40mg frequency:q24h    Pharmacist's Name: Raul Augustine  Pharmacist's Extension:[phone]

## 2020-01-17 NOTE — SUBJECTIVE & OBJECTIVE
Past Medical History:   Diagnosis Date    Allergy     Anemia     Asthma     Back pain     Chronic bronchitis     Cigarette smoker     Depression     Diabetes with neurologic complications     DUB (dysfunctional uterine bleeding) 10/16/2018    GERD (gastroesophageal reflux disease)     High cholesterol     Hyperprolactinemia     Hypertension     Influenza A 2020    Obese body habitus     Pseudotumor cerebri     Renal manifestation of secondary diabetes mellitus     Respiratory failure     Seizures     Simple endometrial hyperplasia     Sleep apnea     Smoker     Tobacco dependence        Past Surgical History:   Procedure Laterality Date    BREAST CYST ASPIRATION       SECTION      X 1    CHOLECYSTECTOMY      COLONOSCOPY      COLONOSCOPY N/A 2019    Procedure: COLONOSCOPY;  Surgeon: Jagruti Sweeney MD;  Location: [facility] ([facility]);  Service: Endoscopy;  Laterality: N/A;  BMI is 63/gastroparesis/3 days full liquid diet 1 day clear liquid see telephone encounter by Ciara Brown     ESOPHAGEAL MANOMETRY WITH MEASUREMENT OF IMPEDANCE N/A 2019    Procedure: MANOMETRY-ESOPHAGEAL-WITH IMPEDANCE;  Surgeon: Jagruti Sweeney MD;  Location: [facility] ([facility]);  Service: Endoscopy;  Laterality: N/A;  Hold Narcotics x 1 days   Hold TCA x 1 days  Propofol only. No fentanyl or benzodiazepine during sedation. If additional sedation needed, discuss with Dr. Sweeney.  10/29 - pt confirmed appt    ESOPHAGOGASTRODUODENOSCOPY N/A 2019    Procedure: EGD (ESOPHAGOGASTRODUODENOSCOPY);  Surgeon: Jagruti Sweeney MD;  Location: [facility] ([facility]);  Service: Endoscopy;  Laterality: N/A;  BMI is 63/gastroparesis/3 days full liquid diet 1 day clear liquid see telephone encounter by Ciara limon    ESOPHAGOGASTRODUODENOSCOPY N/A 2019    Procedure: ESOPHAGOGASTRODUODENOSCOPY (EGD);  Surgeon: Jagruti Sweeney MD;  Location: [facility] ([facility]);  Service:  Endoscopy;  Laterality: N/A;  EGD with EndoFlip /+/- Botox  2nd floor for gastroparesis/BMI 63 **367lbs**  Bariatric Stretcher needed  Manometry probe to be placed endoscopically during EGD procedure  Due to change in protocol, Full liquid diet for 3 days/ 1 day of clear liquids  Hi    FOOT FRACTURE SURGERY Left     HYSTEROSCOPY WITH DILATION AND CURETTAGE OF UTERUS N/A 10/16/2018    KJB    PLANTAR FASCIOTOMY Left 11/18/2019    Procedure: FASCIOTOMY, PLANTAR;  Surgeon: aVlentino Orourke DPM;  Location: [facility] OR [address] [place] [address];  Service: Podiatry;  Laterality: Left;    RETINAL LASER PROCEDURE Left     SINUS SURGERY      UPPER GASTROINTESTINAL ENDOSCOPY         Review of patient's allergies indicates:  No Known Allergies    No current facility-administered medications on file prior to encounter.      Current Outpatient Medications on File Prior to Encounter   Medication Sig    acetaZOLAMIDE (DIAMOX) 500 mg CpSR Take 1 capsule (500 mg total) by mouth 2 (two) times daily.    albuterol (PROVENTIL/VENTOLIN HFA) 90 mcg/actuation inhaler Inhale 2 puffs into the lungs every 6 (six) hours as needed. Rescue    albuterol-ipratropium (DUO-NEB) 2.5 mg-0.5 mg/3 mL nebulizer solution Take 3 mLs by nebulization every 6 (six) hours as needed for Wheezing or Shortness of Breath. Rescue    atorvastatin (LIPITOR) 40 MG tablet Take 1 tablet (40 mg total) by mouth once daily.    blood sugar diagnostic Strp 1 each by Misc.(Non-Drug; Combo Route) route 4 (four) times daily before meals and nightly. ACCU CHEK ELVIA PLUS METER    blood-glucose meter (ACCU-CHEK ELVIA PLUS METER) Misc TEST FOUR TIMES DAILY BEFORE MEALS AND EVERY NIGHT    budesonide-formoterol 160-4.5 mcg (SYMBICORT) 160-4.5 mcg/actuation HFAA Inhale 2 puffs into the lungs every 12 (twelve) hours. Controller    diclofenac sodium (VOLTAREN) 1 % Gel Apply 2 g topically once daily.    fluticasone propionate (FLONASE) 50 mcg/actuation nasal spray 1 spray (50 mcg total) by  Each Nare route once daily.    hydrOXYzine (ATARAX) 50 MG tablet     lactulose (CHRONULAC) 10 gram/15 mL solution TAKE 15 MLS BY MOUTH THREE TIMES DAILY AS NEEDED    lancets (ACCU-CHEK SOFTCLIX LANCETS) Misc 1 Device by Misc.(Non-Drug; Combo Route) route 4 (four) times daily.    levocetirizine (XYZAL) 5 MG tablet TAKE 1 TABLET(5 MG) BY MOUTH EVERY EVENING    losartan (COZAAR) 100 MG tablet TAKE 1 TABLET(100 MG) BY MOUTH EVERY DAY    meloxicam (MOBIC) 15 MG tablet TAKE 1 TABLET(15 MG) BY MOUTH DAILY AS NEEDED FOR HEADACHE    metFORMIN (GLUCOPHAGE-XR) 500 MG 24 hr tablet TAKE 2 TABLETS(1000 MG) BY MOUTH TWICE DAILY WITH MEALS    methylPREDNISolone (MEDROL DOSEPACK) 4 mg tablet use as directed    montelukast (SINGULAIR) 10 mg tablet TAKE 1 TABLET(10 MG) BY MOUTH EVERY EVENING    ondansetron (ZOFRAN) 8 MG tablet Take 1 tablet (8 mg total) by mouth every 8 (eight) hours as needed for Nausea.    oxyCODONE-acetaminophen (PERCOCET)  mg per tablet TAKE 1 TABLET BY MOUTH EVERY 8 HOURS AS NEEDED. MAY CAUSE DROWSINESS    oxyCODONE-acetaminophen (PERCOCET) 5-325 mg per tablet Take 1 tablet by mouth every 12 (twelve) hours as needed for Pain.    oxyCODONE-acetaminophen (PERCOCET) 5-325 mg per tablet Take 1 tablet by mouth every 24 hours as needed for Pain.    pantoprazole (PROTONIX) 40 MG tablet Take 40 mg by mouth once daily.     prucalopride 2 mg Tab Take one tablet by mouth once daily.    QUEtiapine (SEROQUEL) 25 MG Tab TAKE 1 TABLET(25 MG) BY MOUTH EVERY EVENING    semaglutide (OZEMPIC) 1 mg/dose (2 mg/1.5 mL) PnIj Inject 1 mg subq weekly.    sumatriptan (IMITREX) 50 MG tablet Take 1 tab at onset of headaches.  If no improvement in 2 hours, take another.  Do not take more than 2 tabs in 24 hours.    topiramate (TOPAMAX) 100 MG tablet TAKE 3 TABLETS BY MOUTH TWICE DAILY    traMADol (ULTRAM-ER) 100 MG Tb24 Take 1 tablet (100 mg total) by mouth daily as needed.    triamcinolone acetonide 0.1% (KENALOG)  0.1 % ointment Apply topically 2 (two) times daily. for 7 days    venlafaxine (EFFEXOR-XR) 75 MG 24 hr capsule TAKE 1 CAPSULE(75 MG) BY MOUTH EVERY EVENING     Family History     Problem Relation (Age of Onset)    Breast cancer Maternal Grandmother    COPD Father    Cancer Maternal Grandmother    Colon cancer Mother (50), Maternal Grandmother    Colon polyps Mother, Maternal Grandmother    Diabetes Mother    Heart attack Father    Heart disease Father    Hypertension Mother, Father    No Known Problems Paternal Grandmother, Brother, Maternal Aunt, Maternal Uncle, Paternal Aunt, Paternal Uncle, Maternal Grandfather, Paternal Grandfather    Ulcers Father        Tobacco Use    Smoking status: Current Every Day Smoker     Packs/day: 1.00     Years: 27.00     Pack years: 27.00     Types: Cigarettes     Start date: 1/1/1992    Smokeless tobacco: Never Used    Tobacco comment: 1/2 a pack if her days are good   Substance and Sexual Activity    Alcohol use: Yes     Alcohol/week: 0.0 standard drinks     Comment: socially    Drug use: No    Sexual activity: Yes     Partners: Male     Birth control/protection: None     Review of Systems   Constitutional: Positive for chills. Negative for fatigue.   HENT: Negative for ear discharge and ear pain.    Eyes: Negative for pain and itching.   Respiratory: Positive for cough.    Cardiovascular: Negative for chest pain and palpitations.   Gastrointestinal: Negative for nausea and vomiting.   Endocrine: Negative for polyphagia and polyuria.   Genitourinary: Negative for difficulty urinating and dysuria.   Musculoskeletal: Negative for arthralgias and myalgias.   Skin: Negative for rash and wound.   Neurological: Negative for seizures and syncope.   Psychiatric/Behavioral: Negative for agitation and hallucinations.     Objective:     Vital Signs (Most Recent):  Temp: 97.8 °F (36.6 °C) (01/17/20 0745)  Pulse: 60 (01/17/20 0859)  Resp: 16 (01/17/20 0859)  BP: (!) 157/94 (01/17/20  0745)  SpO2: 98 % (01/17/20 0859) Vital Signs (24h Range):  Temp:  [97.8 °F (36.6 °C)-102.6 °F (39.2 °C)] 97.8 °F (36.6 °C)  Pulse:  [] 60  Resp:  [16-24] 16  SpO2:  [94 %-100 %] 98 %  BP: (109-157)/(57-96) 157/94     Weight: (!) 155.1 kg (342 lb)  Body mass index is 58.7 kg/m².    Physical Exam   Constitutional: She is oriented to person, place, and time. No distress.   HENT:   Head: Normocephalic and atraumatic.   Eyes: Conjunctivae are normal. Right eye exhibits no discharge. Left eye exhibits no discharge.   Neck: Neck supple. No tracheal deviation present.   Cardiovascular: Normal rate and normal heart sounds.   Pulmonary/Chest: No respiratory distress.   Mild expiratory wheezing,   Abdominal: Soft. Bowel sounds are normal.   Musculoskeletal: Normal range of motion. She exhibits no deformity.   Neurological: She is alert and oriented to person, place, and time.   Skin: Skin is warm and dry. She is not diaphoretic.           Significant Labs:   BMP:   Recent Labs   Lab 01/17/20  0447   *      K 4.1      CO2 22*   BUN 17   CREATININE 1.3   CALCIUM 8.7     CBC:   Recent Labs   Lab 01/16/20  1242 01/17/20  0447   WBC 5.35 3.31*   HGB 13.6 12.8   HCT 43.1 40.7    231       Significant Imaging: I have reviewed all pertinent imaging results/findings within the past 24 hours.

## 2020-01-17 NOTE — ASSESSMENT & PLAN NOTE
Hold home Metformin.  Glucose may worsen while on steroids.  Diabetic diet and insulin sliding scale.

## 2020-01-17 NOTE — PLAN OF CARE
PHOEBE scheduled PCP follow-up with Dr. Gordillo on 1/27/20 @ 1:20pm.         01/17/20 1413   Post-Acute Status   Post-Acute Authorization Other   Other Status No Post-Acute Service Needs

## 2020-01-17 NOTE — ASSESSMENT & PLAN NOTE
she is on nebulizer and IV solumedrol for Asthma exacerbation,also on Tamiflu for influenza,no sign of respiratory distress at this time,stable on RA

## 2020-01-17 NOTE — PROGRESS NOTES
"[facility]  Progress Note    Patient Name: Yanni Kaiser  MRN: [id number]  Patient Class: IP- Inpatient   Admission Date: 1/16/2020  Length of Stay: 1 days  Attending Physician: Al Fernández MD  Primary Care Provider: Carlyle Gordillo MD        Subjective:     Principal Problem:Acute respiratory distress        HPI:  48 y/o female presents with shortness of breath.  Patient complaining of 3 days of fatigue, nausea, vomiting, diarrhea and shortness of breath.  Patient is a known asthmatic who does continue to smoke despite being admitted to ICU more than once for exacerbations.  She states the last time she smoked was "a couple days ago".  She has been having vomiting and diarrhea multiple times a day for 3 days.  She has been using her rescue inhaler multiple times with no improvement.  She feels tight and hears herself wheezing.   Also complaining of a productive cough.  She has been feeling hot and cold, but has not checked her temperature. She does have body aches as well.  She presented to ER where she was noted to be febrile and tachycardic.  No other complaints.    Overview/Hospital Course:  48 y/o female presents with shortness of breath.  Patient complaining of 3 days of fatigue, nausea, vomiting, diarrhea and shortness of breath.  Patient is a known asthmatic who does continue to smoke despite being admitted to ICU more than once for exacerbations.  She states the last time she smoked was "a couple days ago".  She has been having vomiting and diarrhea multiple times a day for 3 days.  She has been using her rescue inhaler multiple times with no improvement.  She feels tight and hears herself wheezing.  Also complaining of a productive cough.  She has been feeling hot and cold, but has not checked her temperature. She does have body aches as well.  She presented to ER where she was noted to be febrile and tachycardic.she is on nebulizer and IV solumedrol " for Asthma exacerbation,also on Tamiflu for influenza,no sign of respiratory distress at this time,stable on RA.    Past Medical History:   Diagnosis Date    Allergy     Anemia     Asthma     Back pain     Chronic bronchitis     Cigarette smoker     Depression     Diabetes with neurologic complications     DUB (dysfunctional uterine bleeding) 10/16/2018    GERD (gastroesophageal reflux disease)     High cholesterol     Hyperprolactinemia     Hypertension     Influenza A 2020    Obese body habitus     Pseudotumor cerebri     Renal manifestation of secondary diabetes mellitus     Respiratory failure     Seizures     Simple endometrial hyperplasia 2018    Sleep apnea     Smoker     Tobacco dependence        Past Surgical History:   Procedure Laterality Date    BREAST CYST ASPIRATION       SECTION      X 1    CHOLECYSTECTOMY      COLONOSCOPY      COLONOSCOPY N/A 2019    Procedure: COLONOSCOPY;  Surgeon: Jagruti Sweeney MD;  Location: [facility] ([address] [place] [address]);  Service: Endoscopy;  Laterality: N/A;  BMI is 63/gastroparesis/3 days full liquid diet 1 day clear liquid see telephone encounter by Ciara limon    ESOPHAGEAL MANOMETRY WITH MEASUREMENT OF IMPEDANCE N/A 2019    Procedure: MANOMETRY-ESOPHAGEAL-WITH IMPEDANCE;  Surgeon: Jagruti Sweeney MD;  Location: [facility] ([address] [place] [address]);  Service: Endoscopy;  Laterality: N/A;  Hold Narcotics x 1 days   Hold TCA x 1 days  Propofol only. No fentanyl or benzodiazepine during sedation. If additional sedation needed, discuss with Dr. Sweeney.  10/29 - pt confirmed appt    ESOPHAGOGASTRODUODENOSCOPY N/A 2019    Procedure: EGD (ESOPHAGOGASTRODUODENOSCOPY);  Surgeon: Jagruti Sweeney MD;  Location: [facility] ([address] [place] [address]);  Service: Endoscopy;  Laterality: N/A;  BMI is 63/gastroparesis/3 days full liquid diet 1 day clear liquid see telephone encounter by Ciara limon    ESOPHAGOGASTRODUODENOSCOPY N/A 2019    Procedure:  ESOPHAGOGASTRODUODENOSCOPY (EGD);  Surgeon: Jagruti Sweeney MD;  Location: [facility] ([facility]);  Service: Endoscopy;  Laterality: N/A;  EGD with EndoFlip /+/- Botox  2nd floor for gastroparesis/BMI 63 **367lbs**  Bariatric Stretcher needed  Manometry probe to be placed endoscopically during EGD procedure  Due to change in protocol, Full liquid diet for 3 days/ 1 day of clear liquids  Hi    FOOT FRACTURE SURGERY Left     HYSTEROSCOPY WITH DILATION AND CURETTAGE OF UTERUS N/A 10/16/2018    KJB    PLANTAR FASCIOTOMY Left 11/18/2019    Procedure: FASCIOTOMY, PLANTAR;  Surgeon: Valentino Orourke DPM;  [facility];  Service: Podiatry;  Laterality: Left;    RETINAL LASER PROCEDURE Left     SINUS SURGERY      UPPER GASTROINTESTINAL ENDOSCOPY         Review of patient's allergies indicates:  No Known Allergies    No current facility-administered medications on file prior to encounter.      Current Outpatient Medications on File Prior to Encounter   Medication Sig    acetaZOLAMIDE (DIAMOX) 500 mg CpSR Take 1 capsule (500 mg total) by mouth 2 (two) times daily.    albuterol (PROVENTIL/VENTOLIN HFA) 90 mcg/actuation inhaler Inhale 2 puffs into the lungs every 6 (six) hours as needed. Rescue    albuterol-ipratropium (DUO-NEB) 2.5 mg-0.5 mg/3 mL nebulizer solution Take 3 mLs by nebulization every 6 (six) hours as needed for Wheezing or Shortness of Breath. Rescue    atorvastatin (LIPITOR) 40 MG tablet Take 1 tablet (40 mg total) by mouth once daily.    blood sugar diagnostic Strp 1 each by Misc.(Non-Drug; Combo Route) route 4 (four) times daily before meals and nightly. ACCU CHEK ELVIA PLUS METER    blood-glucose meter (ACCU-CHEK ELVIA PLUS METER) Misc TEST FOUR TIMES DAILY BEFORE MEALS AND EVERY NIGHT    budesonide-formoterol 160-4.5 mcg (SYMBICORT) 160-4.5 mcg/actuation HFAA Inhale 2 puffs into the lungs every 12 (twelve) hours. Controller    diclofenac sodium (VOLTAREN) 1 % Gel Apply 2 g topically  once daily.    fluticasone propionate (FLONASE) 50 mcg/actuation nasal spray 1 spray (50 mcg total) by Each Nare route once daily.    hydrOXYzine (ATARAX) 50 MG tablet     lactulose (CHRONULAC) 10 gram/15 mL solution TAKE 15 MLS BY MOUTH THREE TIMES DAILY AS NEEDED    lancets (ACCU-CHEK SOFTCLIX LANCETS) Misc 1 Device by Misc.(Non-Drug; Combo Route) route 4 (four) times daily.    levocetirizine (XYZAL) 5 MG tablet TAKE 1 TABLET(5 MG) BY MOUTH EVERY EVENING    losartan (COZAAR) 100 MG tablet TAKE 1 TABLET(100 MG) BY MOUTH EVERY DAY    meloxicam (MOBIC) 15 MG tablet TAKE 1 TABLET(15 MG) BY MOUTH DAILY AS NEEDED FOR HEADACHE    metFORMIN (GLUCOPHAGE-XR) 500 MG 24 hr tablet TAKE 2 TABLETS(1000 MG) BY MOUTH TWICE DAILY WITH MEALS    methylPREDNISolone (MEDROL DOSEPACK) 4 mg tablet use as directed    montelukast (SINGULAIR) 10 mg tablet TAKE 1 TABLET(10 MG) BY MOUTH EVERY EVENING    ondansetron (ZOFRAN) 8 MG tablet Take 1 tablet (8 mg total) by mouth every 8 (eight) hours as needed for Nausea.    oxyCODONE-acetaminophen (PERCOCET)  mg per tablet TAKE 1 TABLET BY MOUTH EVERY 8 HOURS AS NEEDED. MAY CAUSE DROWSINESS    oxyCODONE-acetaminophen (PERCOCET) 5-325 mg per tablet Take 1 tablet by mouth every 12 (twelve) hours as needed for Pain.    oxyCODONE-acetaminophen (PERCOCET) 5-325 mg per tablet Take 1 tablet by mouth every 24 hours as needed for Pain.    pantoprazole (PROTONIX) 40 MG tablet Take 40 mg by mouth once daily.     prucalopride 2 mg Tab Take one tablet by mouth once daily.    QUEtiapine (SEROQUEL) 25 MG Tab TAKE 1 TABLET(25 MG) BY MOUTH EVERY EVENING    semaglutide (OZEMPIC) 1 mg/dose (2 mg/1.5 mL) PnIj Inject 1 mg subq weekly.    sumatriptan (IMITREX) 50 MG tablet Take 1 tab at onset of headaches.  If no improvement in 2 hours, take another.  Do not take more than 2 tabs in 24 hours.    topiramate (TOPAMAX) 100 MG tablet TAKE 3 TABLETS BY MOUTH TWICE DAILY    traMADol (ULTRAM-ER) 100  MG Tb24 Take 1 tablet (100 mg total) by mouth daily as needed.    triamcinolone acetonide 0.1% (KENALOG) 0.1 % ointment Apply topically 2 (two) times daily. for 7 days    venlafaxine (EFFEXOR-XR) 75 MG 24 hr capsule TAKE 1 CAPSULE(75 MG) BY MOUTH EVERY EVENING     Family History     Problem Relation (Age of Onset)    Breast cancer Maternal Grandmother    COPD Father    Cancer Maternal Grandmother    Colon cancer Mother (50), Maternal Grandmother    Colon polyps Mother, Maternal Grandmother    Diabetes Mother    Heart attack Father    Heart disease Father    Hypertension Mother, Father    No Known Problems Paternal Grandmother, Brother, Maternal Aunt, Maternal Uncle, Paternal Aunt, Paternal Uncle, Maternal Grandfather, Paternal Grandfather    Ulcers Father        Tobacco Use    Smoking status: Current Every Day Smoker     Packs/day: 1.00     Years: 27.00     Pack years: 27.00     Types: Cigarettes     Start date: 1/1/1992    Smokeless tobacco: Never Used    Tobacco comment: 1/2 a pack if her days are good   Substance and Sexual Activity    Alcohol use: Yes     Alcohol/week: 0.0 standard drinks     Comment: socially    Drug use: No    Sexual activity: Yes     Partners: Male     Birth control/protection: None     Review of Systems   Constitutional: Positive for chills. Negative for fatigue.   HENT: Negative for ear discharge and ear pain.    Eyes: Negative for pain and itching.   Respiratory: Positive for cough.    Cardiovascular: Negative for chest pain and palpitations.   Gastrointestinal: Negative for nausea and vomiting.   Endocrine: Negative for polyphagia and polyuria.   Genitourinary: Negative for difficulty urinating and dysuria.   Musculoskeletal: Negative for arthralgias and myalgias.   Skin: Negative for rash and wound.   Neurological: Negative for seizures and syncope.   Psychiatric/Behavioral: Negative for agitation and hallucinations.     Objective:     Vital Signs (Most Recent):  Temp: 97.8 °F  (36.6 °C) (01/17/20 0745)  Pulse: 60 (01/17/20 0859)  Resp: 16 (01/17/20 0859)  BP: (!) 157/94 (01/17/20 0745)  SpO2: 98 % (01/17/20 0859) Vital Signs (24h Range):  Temp:  [97.8 °F (36.6 °C)-102.6 °F (39.2 °C)] 97.8 °F (36.6 °C)  Pulse:  [] 60  Resp:  [16-24] 16  SpO2:  [94 %-100 %] 98 %  BP: (109-157)/(57-96) 157/94     Weight: (!) 155.1 kg (342 lb)  Body mass index is 58.7 kg/m².    Physical Exam   Constitutional: She is oriented to person, place, and time. No distress.   HENT:   Head: Normocephalic and atraumatic.   Eyes: Conjunctivae are normal. Right eye exhibits no discharge. Left eye exhibits no discharge.   Neck: Neck supple. No tracheal deviation present.   Cardiovascular: Normal rate and normal heart sounds.   Pulmonary/Chest: No respiratory distress.   Mild expiratory wheezing,   Abdominal: Soft. Bowel sounds are normal.   Musculoskeletal: Normal range of motion. She exhibits no deformity.   Neurological: She is alert and oriented to person, place, and time.   Skin: Skin is warm and dry. She is not diaphoretic.           Significant Labs:   BMP:   Recent Labs   Lab 01/17/20  0447   *      K 4.1      CO2 22*   BUN 17   CREATININE 1.3   CALCIUM 8.7     CBC:   Recent Labs   Lab 01/16/20  1242 01/17/20  0447   WBC 5.35 3.31*   HGB 13.6 12.8   HCT 43.1 40.7    231       Significant Imaging: I have reviewed all pertinent imaging results/findings within the past 24 hours.      Assessment/Plan:      * Acute respiratory distress  Patient presents in acute respiratory distress.  Met sepsis criteria on presentation with fever, tachycardia and tachypnea.  Symptoms probably combination of moderate intermittent asthma with acute exacerbation and Influenza A.  Provide nebs, oxygen, IV steroids and Levaquin.  Tamiflu for influenza.  Anti tussives and symptomatic care.  she is on nebulizer and IV solumedrol for Asthma exacerbation,also on Tamiflu for influenza,no sign of respiratory  distress at this time,stable on RA      Moderate asthma with acute exacerbation  she is on nebulizer and IV solumedrol for Asthma exacerbation,also on Tamiflu for influenza,no sign of respiratory distress at this time,stable on RA      Stage 3 chronic kidney disease  Creat slightly higher than baseline.  Avoid nephrotoxic medications.      Influenza A  Start Tamiflu    Type 2 diabetes mellitus with stage 3 chronic kidney disease, with long-term current use of insulin  Hold home Metformin.  Glucose may worsen while on steroids.  Diabetic diet and insulin sliding scale.      Hyperlipidemia  Continue home Statin.      Essential hypertension  Resume home Losartan.  Low Na diet.      Idiopathic intracranial hypertension  Resume home Acetazolamide.       Anemia of chronic disease  H/H similar to previous labs.  No evidence of bleeding.    Cigarette nicotine dependence without complication  Spent more than 3 minutes on smoking cessation counseling.      Morbid obesity with BMI of 50.0-59.9, adult          VTE Risk Mitigation (From admission, onward)         Ordered     enoxaparin injection 40 mg  Daily      01/16/20 1805     IP VTE HIGH RISK PATIENT  Once      01/16/20 1729     Place sequential compression device  Until discontinued      01/16/20 1729                      Al Fernández MD  Department of Hospital Medicine   [facility]

## 2020-01-17 NOTE — ASSESSMENT & PLAN NOTE
Patient presents in acute respiratory distress.  Met sepsis criteria on presentation with fever, tachycardia and tachypnea.  Symptoms probably combination of moderate intermittent asthma with acute exacerbation and Influenza A.  Provide nebs, oxygen, IV steroids and Levaquin.  Tamiflu for influenza.  Anti tussives and symptomatic care.  she is on nebulizer and IV solumedrol for Asthma exacerbation,also on Tamiflu for influenza,no sign of respiratory distress at this time,stable on RA

## 2020-01-18 VITALS
SYSTOLIC BLOOD PRESSURE: 170 MMHG | TEMPERATURE: 98 F | HEIGHT: 64 IN | OXYGEN SATURATION: 98 % | HEART RATE: 51 BPM | RESPIRATION RATE: 18 BRPM | BODY MASS INDEX: 50.02 KG/M2 | WEIGHT: 293 LBS | DIASTOLIC BLOOD PRESSURE: 85 MMHG

## 2020-01-18 LAB
ALBUMIN SERPL BCP-MCNC: 2.9 G/DL (ref 3.5–5.2)
ALP SERPL-CCNC: 114 U/L (ref 55–135)
ALT SERPL W/O P-5'-P-CCNC: 108 U/L (ref 10–44)
ANION GAP SERPL CALC-SCNC: 9 MMOL/L (ref 8–16)
AST SERPL-CCNC: 24 U/L (ref 10–40)
BASOPHILS # BLD AUTO: 0 K/UL (ref 0–0.2)
BASOPHILS NFR BLD: 0 % (ref 0–1.9)
BILIRUB SERPL-MCNC: 0.2 MG/DL (ref 0.1–1)
BUN SERPL-MCNC: 16 MG/DL (ref 6–20)
CALCIUM SERPL-MCNC: 8.7 MG/DL (ref 8.7–10.5)
CHLORIDE SERPL-SCNC: 111 MMOL/L (ref 95–110)
CO2 SERPL-SCNC: 18 MMOL/L (ref 23–29)
CREAT SERPL-MCNC: 1.2 MG/DL (ref 0.5–1.4)
DIFFERENTIAL METHOD: ABNORMAL
EOSINOPHIL # BLD AUTO: 0 K/UL (ref 0–0.5)
EOSINOPHIL NFR BLD: 0 % (ref 0–8)
ERYTHROCYTE [DISTWIDTH] IN BLOOD BY AUTOMATED COUNT: 15.7 % (ref 11.5–14.5)
EST. GFR  (AFRICAN AMERICAN): >60 ML/MIN/1.73 M^2
EST. GFR  (NON AFRICAN AMERICAN): 54 ML/MIN/1.73 M^2
GLUCOSE SERPL-MCNC: 140 MG/DL (ref 70–110)
HCT VFR BLD AUTO: 41.3 % (ref 37–48.5)
HGB BLD-MCNC: 12.9 G/DL (ref 12–16)
IMM GRANULOCYTES # BLD AUTO: 0.03 K/UL (ref 0–0.04)
IMM GRANULOCYTES NFR BLD AUTO: 0.4 % (ref 0–0.5)
LYMPHOCYTES # BLD AUTO: 0.6 K/UL (ref 1–4.8)
LYMPHOCYTES NFR BLD: 7.7 % (ref 18–48)
MCH RBC QN AUTO: 29.1 PG (ref 27–31)
MCHC RBC AUTO-ENTMCNC: 31.2 G/DL (ref 32–36)
MCV RBC AUTO: 93 FL (ref 82–98)
MONOCYTES # BLD AUTO: 0.4 K/UL (ref 0.3–1)
MONOCYTES NFR BLD: 4.7 % (ref 4–15)
NEUTROPHILS # BLD AUTO: 6.9 K/UL (ref 1.8–7.7)
NEUTROPHILS NFR BLD: 87.2 % (ref 38–73)
NRBC BLD-RTO: 0 /100 WBC
PLATELET # BLD AUTO: 258 K/UL (ref 150–350)
PMV BLD AUTO: 8.9 FL (ref 9.2–12.9)
POCT GLUCOSE: 117 MG/DL (ref 70–110)
POCT GLUCOSE: 128 MG/DL (ref 70–110)
POCT GLUCOSE: 156 MG/DL (ref 70–110)
POTASSIUM SERPL-SCNC: 3.9 MMOL/L (ref 3.5–5.1)
PROT SERPL-MCNC: 6.8 G/DL (ref 6–8.4)
RBC # BLD AUTO: 4.43 M/UL (ref 4–5.4)
SODIUM SERPL-SCNC: 138 MMOL/L (ref 136–145)
WBC # BLD AUTO: 7.95 K/UL (ref 3.9–12.7)

## 2020-01-18 PROCEDURE — 94640 AIRWAY INHALATION TREATMENT: CPT | Mod: HCNC

## 2020-01-18 PROCEDURE — 25000003 PHARM REV CODE 250: Mod: HCNC | Performed by: HOSPITALIST

## 2020-01-18 PROCEDURE — 25000242 PHARM REV CODE 250 ALT 637 W/ HCPCS: Mod: HCNC | Performed by: EMERGENCY MEDICINE

## 2020-01-18 PROCEDURE — 36415 COLL VENOUS BLD VENIPUNCTURE: CPT | Mod: HCNC

## 2020-01-18 PROCEDURE — 80053 COMPREHEN METABOLIC PANEL: CPT | Mod: HCNC

## 2020-01-18 PROCEDURE — 85025 COMPLETE CBC W/AUTO DIFF WBC: CPT | Mod: HCNC

## 2020-01-18 PROCEDURE — 63600175 PHARM REV CODE 636 W HCPCS: Mod: HCNC | Performed by: HOSPITALIST

## 2020-01-18 RX ORDER — AMOXICILLIN AND CLAVULANATE POTASSIUM 875; 125 MG/1; MG/1
1 TABLET, FILM COATED ORAL 2 TIMES DAILY
Qty: 14 TABLET | Refills: 0 | Status: SHIPPED | OUTPATIENT
Start: 2020-01-18 | End: 2020-01-25

## 2020-01-18 RX ORDER — OSELTAMIVIR PHOSPHATE 75 MG/1
75 CAPSULE ORAL 2 TIMES DAILY
Qty: 8 CAPSULE | Refills: 0 | Status: SHIPPED | OUTPATIENT
Start: 2020-01-18 | End: 2020-01-22

## 2020-01-18 RX ADMIN — TOPIRAMATE 300 MG: 100 TABLET, FILM COATED ORAL at 09:01

## 2020-01-18 RX ADMIN — CETIRIZINE HYDROCHLORIDE 5 MG: 5 TABLET, FILM COATED ORAL at 09:01

## 2020-01-18 RX ADMIN — OSELTAMIVIR PHOSPHATE 75 MG: 75 CAPSULE ORAL at 09:01

## 2020-01-18 RX ADMIN — ATORVASTATIN CALCIUM 40 MG: 40 TABLET, FILM COATED ORAL at 09:01

## 2020-01-18 RX ADMIN — LOSARTAN POTASSIUM 100 MG: 25 TABLET ORAL at 09:01

## 2020-01-18 RX ADMIN — FLUTICASONE FUROATE AND VILANTEROL TRIFENATATE 1 PUFF: 100; 25 POWDER RESPIRATORY (INHALATION) at 08:01

## 2020-01-18 RX ADMIN — METHYLPREDNISOLONE SODIUM SUCCINATE 80 MG: 125 INJECTION, POWDER, FOR SOLUTION INTRAMUSCULAR; INTRAVENOUS at 06:01

## 2020-01-18 RX ADMIN — GUAIFENESIN AND DEXTROMETHORPHAN 5 ML: 100; 10 SYRUP ORAL at 06:01

## 2020-01-18 RX ADMIN — IPRATROPIUM BROMIDE AND ALBUTEROL SULFATE 3 ML: .5; 3 SOLUTION RESPIRATORY (INHALATION) at 08:01

## 2020-01-18 RX ADMIN — GUAIFENESIN AND DEXTROMETHORPHAN 5 ML: 100; 10 SYRUP ORAL at 12:01

## 2020-01-18 RX ADMIN — PANTOPRAZOLE SODIUM 40 MG: 40 TABLET, DELAYED RELEASE ORAL at 09:01

## 2020-01-18 NOTE — DISCHARGE SUMMARY
"[facility] Hospital Medicine  Discharge Summary      Patient Name: Yanni Kaiser  MRN: [id number]  Admission Date: 1/16/2020  Hospital Length of Stay: 2 days  Discharge Date and Time:  01/18/2020 9:25 AM  Attending Physician: Al Fernández MD   Discharging Provider: Al Fernández MD  Primary Care Provider: Carlyle Gordillo MD      HPI:   50 y/o female presents with shortness of breath.  Patient complaining of 3 days of fatigue, nausea, vomiting, diarrhea and shortness of breath.  Patient is a known asthmatic who does continue to smoke despite being admitted to ICU more than once for exacerbations.  She states the last time she smoked was "a couple days ago".  She has been having vomiting and diarrhea multiple times a day for 3 days.  She has been using her rescue inhaler multiple times with no improvement.  She feels tight and hears herself wheezing.   Also complaining of a productive cough.  She has been feeling hot and cold, but has not checked her temperature. She does have body aches as well.  She presented to ER where she was noted to be febrile and tachycardic.  No other complaints.    * No surgery found *      Hospital Course:   50 y/o female presents with shortness of breath.  Patient complaining of 3 days of fatigue, nausea, vomiting, diarrhea and shortness of breath.  Patient is a known asthmatic who does continue to smoke despite being admitted to ICU more than once for exacerbations.  She states the last time she smoked was "a couple days ago".  She has been having vomiting and diarrhea multiple times a day for 3 days.  She has been using her rescue inhaler multiple times with no improvement.  She feels tight and hears herself wheezing.  Also complaining of a productive cough.  She has been feeling hot and cold, but has not checked her temperature. She does have body aches as well.  She presented to ER where she was noted to be febrile and tachycardic.she is on " nebulizer and IV solumedrol for Asthma exacerbation,also on Tamiflu for influenza,her respiratody symtoms resolved,at DC time she was stable on RA,she has been discharged home with Tamiflu and PO Abx and follow up with PCP in next few days.     Consults:     No new Assessment & Plan notes have been filed under this hospital service since the last note was generated.  Service: Hospital Medicine    Final Active Diagnoses:    Diagnosis Date Noted POA    PRINCIPAL PROBLEM:  Acute respiratory distress [R06.03] 01/16/2020 Yes    Moderate asthma with acute exacerbation [J45.901] 01/17/2020 Yes    Influenza A [J10.1] 01/16/2020 Yes    Stage 3 chronic kidney disease [N18.3] 01/16/2020 Yes     Chronic    Type 2 diabetes mellitus with stage 3 chronic kidney disease, with long-term current use of insulin [E11.22, N18.3, Z79.4] 03/08/2017 Not Applicable    Hyperlipidemia [E78.5] 07/30/2016 Yes     Chronic    Essential hypertension [I10] 02/04/2016 Yes     Chronic    Idiopathic intracranial hypertension [G93.2] 02/04/2016 Yes     Chronic    Anemia of chronic disease [D63.8] 06/10/2015 Yes     Chronic    Cigarette nicotine dependence without complication [F17.210] 09/03/2014 Yes    Morbid obesity with BMI of 50.0-59.9, adult [E66.01, Z68.43] 09/03/2014 Not Applicable      Problems Resolved During this Admission:       Discharged Condition: stable    Disposition: Home or Self Care    Follow Up:  Follow-up Information     Carlyle Gordillo MD. Go on 1/27/2020.    Specialties:  Family Medicine, Wound Care  Why:  Outpatient Services, Primary Care Follow-up Appointment, Please arrive to clinic for 1:20PM  Contact information:  [place] [id number] [phone]             Carylle Gordillo MD In 1 week.    Specialties:  Family Medicine, Wound Care  Contact information:  [place] [id number] [phone]                 Patient Instructions:      Activity as tolerated       Significant Diagnostic  Studies: Labs:   BMP:   Recent Labs   Lab 01/16/20  1242 01/17/20  0447 01/18/20  0429   GLU 75 136* 140*    138 138   K 3.5 4.1 3.9    110 111*   CO2 20* 22* 18*   BUN 17 17 16   CREATININE 1.8* 1.3 1.2   CALCIUM 9.5 8.7 8.7   , CMP   Recent Labs   Lab 01/16/20  1242 01/17/20  0447 01/18/20  0429    138 138   K 3.5 4.1 3.9    110 111*   CO2 20* 22* 18*   GLU 75 136* 140*   BUN 17 17 16   CREATININE 1.8* 1.3 1.2   CALCIUM 9.5 8.7 8.7   PROT 7.6 6.9 6.8   ALBUMIN 3.4* 2.9* 2.9*   BILITOT 0.3 0.2 0.2   ALKPHOS 150* 130 114   * 69* 24   * 158* 108*   ANIONGAP 8 6* 9   ESTGFRAFRICA 38* 56* >60   EGFRNONAA 33* 49* 54*    and CBC   Recent Labs   Lab 01/16/20  1242 01/17/20  0447 01/18/20  0429   WBC 5.35 3.31* 7.95   HGB 13.6 12.8 12.9   HCT 43.1 40.7 41.3    231 258     Microbiology:   Blood Culture   Lab Results   Component Value Date    LABBLOO No growth after 5 days. 03/20/2019    LABBLOO No growth after 5 days. 03/20/2019     Radiology: X-Ray: CXR: X-Ray Chest 1 View (CXR): No results found for this visit on 01/16/20. and X-Ray Chest PA and Lateral (CXR): No results found for this visit on 01/16/20.    Pending Diagnostic Studies:     None         Medications:  Reconciled Home Medications:      Medication List      START taking these medications    amoxicillin-clavulanate 875-125mg 875-125 mg per tablet  Commonly known as:  AUGMENTIN  Take 1 tablet by mouth 2 (two) times daily. for 7 days     oseltamivir 75 MG capsule  Commonly known as:  TAMIFLU  Take 1 capsule (75 mg total) by mouth 2 (two) times daily. for 4 days        CHANGE how you take these medications    oxyCODONE-acetaminophen 5-325 mg per tablet  Commonly known as:  PERCOCET  Take 1 tablet by mouth every 12 (twelve) hours as needed for Pain.  What changed:  Another medication with the same name was removed. Continue taking this medication, and follow the directions you see here.        CONTINUE taking these  medications    acetaZOLAMIDE 500 mg Cpsr  Commonly known as:  DIAMOX  Take 1 capsule (500 mg total) by mouth 2 (two) times daily.     albuterol 90 mcg/actuation inhaler  Commonly known as:  PROVENTIL/VENTOLIN HFA  Inhale 2 puffs into the lungs every 6 (six) hours as needed. Rescue     albuterol-ipratropium 2.5 mg-0.5 mg/3 mL nebulizer solution  Commonly known as:  DUO-NEB  Take 3 mLs by nebulization every 6 (six) hours as needed for Wheezing or Shortness of Breath. Rescue     atorvastatin 40 MG tablet  Commonly known as:  LIPITOR  Take 1 tablet (40 mg total) by mouth once daily.     blood sugar diagnostic Strp  1 each by Misc.(Non-Drug; Combo Route) route 4 (four) times daily before meals and nightly. ACCU CHEK ELVIA PLUS METER     blood-glucose meter Misc  Commonly known as:  Accu-Chek Elvia Plus Meter  TEST FOUR TIMES DAILY BEFORE MEALS AND EVERY NIGHT     budesonide-formoterol 160-4.5 mcg 160-4.5 mcg/actuation Hfaa  Commonly known as:  SYMBICORT  Inhale 2 puffs into the lungs every 12 (twelve) hours. Controller     diclofenac sodium 1 % Gel  Commonly known as:  Voltaren  Apply 2 g topically once daily.     fluticasone propionate 50 mcg/actuation nasal spray  Commonly known as:  FLONASE  1 spray (50 mcg total) by Each Nare route once daily.     hydrOXYzine 50 MG tablet  Commonly known as:  ATARAX     lactulose 10 gram/15 mL solution  Commonly known as:  CHRONULAC  TAKE 15 MLS BY MOUTH THREE TIMES DAILY AS NEEDED     lancets Misc  Commonly known as:  Accu-Chek Softclix Lancets  1 Device by Misc.(Non-Drug; Combo Route) route 4 (four) times daily.     levocetirizine 5 MG tablet  Commonly known as:  XYZAL  TAKE 1 TABLET(5 MG) BY MOUTH EVERY EVENING     losartan 100 MG tablet  Commonly known as:  COZAAR  TAKE 1 TABLET(100 MG) BY MOUTH EVERY DAY     meloxicam 15 MG tablet  Commonly known as:  MOBIC  TAKE 1 TABLET(15 MG) BY MOUTH DAILY AS NEEDED FOR HEADACHE     metFORMIN 500 MG 24 hr tablet  Commonly known as:   GLUCOPHAGE-XR  TAKE 2 TABLETS(1000 MG) BY MOUTH TWICE DAILY WITH MEALS     montelukast 10 mg tablet  Commonly known as:  SINGULAIR  TAKE 1 TABLET(10 MG) BY MOUTH EVERY EVENING     ondansetron 8 MG tablet  Commonly known as:  Zofran  Take 1 tablet (8 mg total) by mouth every 8 (eight) hours as needed for Nausea.     pantoprazole 40 MG tablet  Commonly known as:  PROTONIX  Take 40 mg by mouth once daily.     prucalopride 2 mg Tab  Take one tablet by mouth once daily.     QUEtiapine 25 MG Tab  Commonly known as:  SEROQUEL  TAKE 1 TABLET(25 MG) BY MOUTH EVERY EVENING     semaglutide 1 mg/dose (2 mg/1.5 mL) Pnij  Commonly known as:  Ozempic  Inject 1 mg subq weekly.     sumatriptan 50 MG tablet  Commonly known as:  IMITREX  Take 1 tab at onset of headaches.  If no improvement in 2 hours, take another.  Do not take more than 2 tabs in 24 hours.     topiramate 100 MG tablet  Commonly known as:  TOPAMAX  TAKE 3 TABLETS BY MOUTH TWICE DAILY     traMADol 100 MG Tb24  Commonly known as:  ULTRAM-ER  Take 1 tablet (100 mg total) by mouth daily as needed.     triamcinolone acetonide 0.1% 0.1 % ointment  Commonly known as:  KENALOG  Apply topically 2 (two) times daily. for 7 days     venlafaxine 75 MG 24 hr capsule  Commonly known as:  EFFEXOR-XR  TAKE 1 CAPSULE(75 MG) BY MOUTH EVERY EVENING        STOP taking these medications    methylPREDNISolone 4 mg tablet  Commonly known as:  MEDROL DOSEPACK            Indwelling Lines/Drains at time of discharge:   Lines/Drains/Airways     None                 Time spent on the discharge of patient: over 30  minutes  Patient was seen and examined on the date of discharge and determined to be suitable for discharge.         Al Fernández MD  Department of Hospital Medicine  [facility]

## 2020-01-18 NOTE — PLAN OF CARE
Reviewed Plan of Care with patient.  Patient verbalized understanding.  Pt is receiving 2L/NC.  No resp distress noted.  Pt  exhibits non-productive coughing.  Pt is on precautions for flu.  VSS. NAD noted.  Safety measures in place.  No falls on injuries this shift.

## 2020-01-18 NOTE — PLAN OF CARE
01/18/20 1050   Final Note   Assessment Type Final Discharge Note   Anticipated Discharge Disposition Home   What phone number can be called within the next 1-3 days to see how you are doing after discharge?   ([phone])   Hospital Follow Up  Appt(s) scheduled? Yes   Discharge plans and expectations educations in teach back method with documentation complete? Yes   Right Care Referral Info   Post Acute Recommendation No Care

## 2020-01-18 NOTE — PROGRESS NOTES
[facility] CASE MANAGEMENT                  WRITTEN DISCHARGE INFORMATION      APPOINTMENTS AND RESOURCES TO HELP YOU MANAGE YOUR CARE AT HOME BASED ON YOUR PREFERENCES:  (If an appointment is not scheduled for you when you leave the hospital, call your doctor to schedule a follow up visit within a week)    Follow-up Information     Carlyle Gordillo MD. Go on 1/27/2020.    Specialties:  Family Medicine, Wound Care  Why:  Outpatient Services, Primary Care Follow-up Appointment, Please arrive to clinic for 1:20PM  Contact information:  [address] [place] [id number]  [phone]                                Healthy Living Instructions to HELP MANAGE YOUR CARE AT HOME:  Things You are responsible for:  1.    Getting your prescriptions filled   2.    Taking your medications as directed, DO NOT MISS ANY DOSES!  3.    Following the diet and exercise recommended by your doctor  4.    Going to your follow-up doctor appointment. This is important because it allows the doctor to monitor your progress and determine if any changes need to made to your treatment plan.  5. If you have any questions about MANAGING YOUR CARE AT HOME Call the Nurse Care Line for 24/7 Assistance [phone]       Please answer any calls you may receive from [facility]. We want to continue to support you as you manage your healthcare needs. [facility] is happy to have the opportunity to serve you.      Thank you for choosing [facility] for your healthcare needs!  Your [facility] Case Management Team,

## 2020-01-19 NOTE — PROGRESS NOTES
Approximately 6 weeks status post plantar fasciectomy.  She is doing well with some discomfort however which she describes as more surgical pain rather than heel pain compared to preoperatively.  She is working herself out of the walking boot.  She will follow up in 2 weeks.  Will order physical therapy at this point secondary to ongoing discomfort.  Follow-up after physical therapy.

## 2020-01-21 ENCOUNTER — NURSE TRIAGE (OUTPATIENT)
Dept: ADMINISTRATIVE | Facility: CLINIC | Age: 50
End: 2020-01-21

## 2020-01-21 NOTE — TELEPHONE ENCOUNTER
Unable to contact patient. Patient was advised by Nurse On call to return to the ED for current symptoms.

## 2020-01-21 NOTE — TELEPHONE ENCOUNTER
Called pt for [facility]CC call. C/o upper abd pain that has worsened since discharge. Rates 9/10. Denies n/v, but does report decreased appetite. Instructed to go to ER now. Pt agrees. Message to pcp  Reason for Disposition   Pain is mainly in upper abdomen (if needed ask: 'is it mainly above the belly button?')   SEVERE abdominal pain (e.g., excruciating)    Additional Information   Negative: Passed out (i.e., fainted, collapsed and was not responding)   Negative: Shock suspected (e.g., cold/pale/clammy skin, too weak to stand, low BP, rapid pulse)   Negative: Sounds like a life-threatening emergency to the triager   Negative: Chest pain   Negative: Passed out (i.e., fainted, collapsed and was not responding)   Negative: Shock suspected (e.g., cold/pale/clammy skin, too weak to stand, low BP, rapid pulse)   Negative: Visible sweat on face or sweat is dripping down   Negative: Chest pain    Protocols used: ABDOMINAL PAIN - FEMALE-A-OH, ABDOMINAL PAIN - UPPER-A-OH

## 2020-01-21 NOTE — TELEPHONE ENCOUNTER
Notified post care nurse pt ready for her call. Giovanna RN verbalizes she will call pt back now.     Reason for Disposition   Caller has already spoken with another triager and has no further questions    Additional Information   Negative: Caller has already spoken with the PCP (or office), and has no further questions    Protocols used: NO CONTACT OR DUPLICATE CONTACT CALL-A-OH

## 2020-01-27 ENCOUNTER — OFFICE VISIT (OUTPATIENT)
Dept: FAMILY MEDICINE | Facility: CLINIC | Age: 50
End: 2020-01-27
Payer: MEDICARE

## 2020-01-27 VITALS
RESPIRATION RATE: 17 BRPM | OXYGEN SATURATION: 97 % | HEART RATE: 86 BPM | BODY MASS INDEX: 50.02 KG/M2 | DIASTOLIC BLOOD PRESSURE: 86 MMHG | WEIGHT: 293 LBS | HEIGHT: 64 IN | TEMPERATURE: 99 F | SYSTOLIC BLOOD PRESSURE: 141 MMHG

## 2020-01-27 DIAGNOSIS — G93.2 IDIOPATHIC INTRACRANIAL HYPERTENSION: Chronic | ICD-10-CM

## 2020-01-27 DIAGNOSIS — R51.9 CHRONIC NONINTRACTABLE HEADACHE, UNSPECIFIED HEADACHE TYPE: ICD-10-CM

## 2020-01-27 DIAGNOSIS — G43.009 MIGRAINE WITHOUT AURA AND WITHOUT STATUS MIGRAINOSUS, NOT INTRACTABLE: Chronic | ICD-10-CM

## 2020-01-27 DIAGNOSIS — J10.1 INFLUENZA A: Primary | ICD-10-CM

## 2020-01-27 DIAGNOSIS — I10 BENIGN ESSENTIAL HTN: ICD-10-CM

## 2020-01-27 DIAGNOSIS — G89.29 CHRONIC NONINTRACTABLE HEADACHE, UNSPECIFIED HEADACHE TYPE: ICD-10-CM

## 2020-01-27 PROBLEM — J45.901 MODERATE ASTHMA WITH ACUTE EXACERBATION: Status: RESOLVED | Noted: 2020-01-17 | Resolved: 2020-01-27

## 2020-01-27 PROBLEM — Z91.51 HISTORY OF SUICIDE ATTEMPT: Status: ACTIVE | Noted: 2018-08-21

## 2020-01-27 PROCEDURE — 99495 TRANSJ CARE MGMT MOD F2F 14D: CPT | Mod: HCNC,S$GLB,, | Performed by: FAMILY MEDICINE

## 2020-01-27 PROCEDURE — 99999 PR PBB SHADOW E&M-EST. PATIENT-LVL IV: ICD-10-PCS | Mod: PBBFAC,HCNC,, | Performed by: FAMILY MEDICINE

## 2020-01-27 PROCEDURE — 99999 PR PBB SHADOW E&M-EST. PATIENT-LVL IV: CPT | Mod: PBBFAC,HCNC,, | Performed by: FAMILY MEDICINE

## 2020-01-27 PROCEDURE — 99495 TCM SERVICES (MODERATE COMPLEXITY): ICD-10-PCS | Mod: HCNC,S$GLB,, | Performed by: FAMILY MEDICINE

## 2020-01-27 RX ORDER — SUMATRIPTAN 50 MG/1
TABLET, FILM COATED ORAL
Qty: 9 TABLET | Refills: 5 | Status: SHIPPED | OUTPATIENT
Start: 2020-01-27 | End: 2020-06-23 | Stop reason: SDUPTHER

## 2020-01-27 RX ORDER — LOSARTAN POTASSIUM 100 MG/1
TABLET ORAL
Qty: 90 TABLET | Refills: 0 | Status: SHIPPED | OUTPATIENT
Start: 2020-01-27 | End: 2020-08-16

## 2020-01-27 NOTE — PROGRESS NOTES
Transitional Care Note  Subjective:       Patient ID: Yanni Kaiser is a 49 y.o. female.  Chief Complaint: Hospital Follow Up    Family and/or Caretaker present at visit?  No.  Diagnostic tests reviewed/disposition: I have reviewed all completed as well as pending diagnostic tests at the time of discharge.  Disease/illness education: chronic bronchtiis and tobacco abuse  Home health/community services discussion/referrals: Patient does not have home health established from hospital visit.  They do not need home health.  If needed, we will set up home health for the patient.   Establishment or re-establishment of referral orders for community resources: No other necessary community resources.   Discussion with other health care providers: No discussion with other health care providers necessary.   Patient presenting today for follow up after being admitted for asthma exacerbation secondary to influenza A.  She states that she is feeling much better and has only had to use her rescue inhaler twice since discharge.  She has cut back from 1ppd of cigarettes to 0.5ppd.  She has a mild dry cough.  No wheezing or shortness of breath today.  Her only complaint is recurring left sided headaches.  She has been seeing neurology at [facility] for ICH and epilepsy, but has not been able to get in.  She is afraid that increased pressure is causing her current headaches.  There has been no weakness, blurred vision, or slurred speech.      Review of Systems   Constitutional: Negative.    HENT: Negative.    Eyes: Negative.    Respiratory: Negative.    Cardiovascular: Negative.    Gastrointestinal: Negative.    Endocrine: Negative.    Genitourinary: Negative.    Musculoskeletal: Positive for myalgias.   Skin: Negative.    Neurological: Positive for headaches. Negative for seizures, facial asymmetry, speech difficulty, light-headedness and numbness.       Objective:      Physical Exam   Constitutional: She is oriented to  person, place, and time. She appears well-developed and well-nourished.   HENT:   Head: Normocephalic and atraumatic.   Eyes: Pupils are equal, round, and reactive to light. Conjunctivae and EOM are normal.   Neck: Normal range of motion. Neck supple.   Cardiovascular: Normal rate, regular rhythm and normal heart sounds.   Pulmonary/Chest: Effort normal and breath sounds normal.   Abdominal: Soft. Bowel sounds are normal.   Musculoskeletal: Normal range of motion.   Neurological: She is alert and oriented to person, place, and time.   Skin: Skin is warm and dry.       Assessment:       1. Influenza A    2. Chronic nonintractable headache, unspecified headache type    3. Idiopathic intracranial hypertension    4. Migraine without aura and without status migrainosus, not intractable    5. Benign essential HTN        Plan:       1.  Influenza A resolved  2.  Refer to neurology on [facility]  3.  Follow up as needed  4.  Refill htn medication per patient request.    5.  Continue cutting back on smoking  6.  Follow up with me in 3 months or sooner if needed      Carlyle Gordillo MD

## 2020-02-07 DIAGNOSIS — E11.9 TYPE 2 DIABETES MELLITUS WITHOUT COMPLICATION: ICD-10-CM

## 2020-02-20 ENCOUNTER — NURSE TRIAGE (OUTPATIENT)
Dept: ADMINISTRATIVE | Facility: CLINIC | Age: 50
End: 2020-02-20

## 2020-02-20 NOTE — TELEPHONE ENCOUNTER
Pt states she just left her dentist and they said her BP was 152/110, she denies any symptoms other than a slight headache, advised her to see a provider within 24 hours and to check her BP again once she gets home and call back if it is still raised or she has any symptoms or concerns, caller agreed    Reason for Disposition   Systolic BP  >= 160 OR Diastolic >= 100    Additional Information   Negative: Severe difficulty breathing (e.g., struggling for each breath, speaks in single words)   Negative: Difficult to awaken or acting confused (e.g., disoriented, slurred speech)   Negative: [1] Weakness of the face, arm or leg on one side of the body AND [2] new onset   Negative: [1] Numbness (i.e., loss of sensation) of the face, arm or leg on one side of the body AND [2] new onset   Negative: [1] Chest pain lasts > 5 minutes AND [2] history of heart disease  (i.e., heart attack, bypass surgery, angina, angioplasty, CHF)   Negative: [1] Chest pain AND [2] took nitrogylcerin AND [3] pain was not relieved   Negative: Sounds like a life-threatening emergency to the triager   Negative: Symptom is main concern  (e.g., headache, chest pain)   Negative: Low blood pressure is main concern   Negative: [1] Systolic BP  >= 160 OR Diastolic >= 100 AND [2] cardiac or neurologic symptoms (e.g., chest pain, difficulty breathing, unsteady gait, blurred vision)   Negative: [1] Pregnant > 20 weeks AND [2] new hand or face swelling   Negative: [1] Pregnant > 20 weeks AND [2] BP Systolic BP  >= 140 OR Diastolic >= 90   Negative: [1] Systolic BP  >= 200 OR Diastolic >= 120  AND [2] having NO cardiac or neurologic symptoms   Negative: [1] Postpartum < 6 weeks AND [2] BP Systolic BP  >= 140 OR Diastolic >= 90   Negative: [1] Systolic BP  >= 180 OR Diastolic >= 110 AND [2] missed most recent dose of blood pressure medication   Negative: Systolic BP  >= 180 OR Diastolic >= 110   Negative: Ran out of BP  medications    Protocols used: HIGH BLOOD PRESSURE-A-AH

## 2020-02-24 ENCOUNTER — OFFICE VISIT (OUTPATIENT)
Dept: PODIATRY | Facility: CLINIC | Age: 50
End: 2020-02-24
Payer: MEDICARE

## 2020-02-24 VITALS
HEIGHT: 64 IN | HEART RATE: 86 BPM | DIASTOLIC BLOOD PRESSURE: 86 MMHG | WEIGHT: 293 LBS | SYSTOLIC BLOOD PRESSURE: 140 MMHG | BODY MASS INDEX: 50.02 KG/M2

## 2020-02-24 DIAGNOSIS — M72.2 PLANTAR FASCIITIS: Primary | ICD-10-CM

## 2020-02-24 DIAGNOSIS — M76.60 ACHILLES TENDINITIS, UNSPECIFIED LATERALITY: ICD-10-CM

## 2020-02-24 PROCEDURE — 99999 PR PBB SHADOW E&M-EST. PATIENT-LVL III: CPT | Mod: PBBFAC,HCNC,, | Performed by: PODIATRIST

## 2020-02-24 PROCEDURE — 99999 PR PBB SHADOW E&M-EST. PATIENT-LVL III: ICD-10-PCS | Mod: PBBFAC,HCNC,, | Performed by: PODIATRIST

## 2020-02-24 PROCEDURE — 3077F PR MOST RECENT SYSTOLIC BLOOD PRESSURE >= 140 MM HG: ICD-10-PCS | Mod: HCNC,CPTII,S$GLB, | Performed by: PODIATRIST

## 2020-02-24 PROCEDURE — 3079F DIAST BP 80-89 MM HG: CPT | Mod: HCNC,CPTII,S$GLB, | Performed by: PODIATRIST

## 2020-02-24 PROCEDURE — 3077F SYST BP >= 140 MM HG: CPT | Mod: HCNC,CPTII,S$GLB, | Performed by: PODIATRIST

## 2020-02-24 PROCEDURE — 99214 OFFICE O/P EST MOD 30 MIN: CPT | Mod: HCNC,S$GLB,, | Performed by: PODIATRIST

## 2020-02-24 PROCEDURE — 3008F PR BODY MASS INDEX (BMI) DOCUMENTED: ICD-10-PCS | Mod: HCNC,CPTII,S$GLB, | Performed by: PODIATRIST

## 2020-02-24 PROCEDURE — 3079F PR MOST RECENT DIASTOLIC BLOOD PRESSURE 80-89 MM HG: ICD-10-PCS | Mod: HCNC,CPTII,S$GLB, | Performed by: PODIATRIST

## 2020-02-24 PROCEDURE — 99214 PR OFFICE/OUTPT VISIT, EST, LEVL IV, 30-39 MIN: ICD-10-PCS | Mod: HCNC,S$GLB,, | Performed by: PODIATRIST

## 2020-02-24 PROCEDURE — 3008F BODY MASS INDEX DOCD: CPT | Mod: HCNC,CPTII,S$GLB, | Performed by: PODIATRIST

## 2020-02-24 RX ORDER — METHYLPREDNISOLONE 4 MG/1
TABLET ORAL
Qty: 1 PACKAGE | Refills: 1 | Status: SHIPPED | OUTPATIENT
Start: 2020-02-24 | End: 2020-03-16

## 2020-02-26 ENCOUNTER — CLINICAL SUPPORT (OUTPATIENT)
Dept: REHABILITATION | Facility: HOSPITAL | Age: 50
End: 2020-02-26
Attending: PODIATRIST
Payer: MEDICARE

## 2020-02-26 DIAGNOSIS — F33.3 SEVERE EPISODE OF RECURRENT MAJOR DEPRESSIVE DISORDER, WITH PSYCHOTIC FEATURES: ICD-10-CM

## 2020-02-26 DIAGNOSIS — R29.898 ANKLE WEAKNESS: ICD-10-CM

## 2020-02-26 DIAGNOSIS — J32.1 CHRONIC FRONTAL SINUSITIS: ICD-10-CM

## 2020-02-26 DIAGNOSIS — M72.2 PLANTAR FASCIITIS: ICD-10-CM

## 2020-02-26 DIAGNOSIS — M62.89 TIGHTNESS OF LEFT GASTROCNEMIUS MUSCLE: ICD-10-CM

## 2020-02-26 DIAGNOSIS — R68.89 DECREASED STRENGTH, ENDURANCE, AND MOBILITY: ICD-10-CM

## 2020-02-26 DIAGNOSIS — R53.1 DECREASED STRENGTH, ENDURANCE, AND MOBILITY: ICD-10-CM

## 2020-02-26 DIAGNOSIS — Z74.09 DECREASED STRENGTH, ENDURANCE, AND MOBILITY: ICD-10-CM

## 2020-02-26 PROBLEM — M79.673 FOOT PAIN: Status: ACTIVE | Noted: 2020-02-26

## 2020-02-26 PROCEDURE — 97163 PT EVAL HIGH COMPLEX 45 MIN: CPT | Mod: HCNC,PN

## 2020-02-26 PROCEDURE — 97110 THERAPEUTIC EXERCISES: CPT | Mod: HCNC,PN

## 2020-02-26 RX ORDER — LEVOCETIRIZINE DIHYDROCHLORIDE 5 MG/1
TABLET, FILM COATED ORAL
Qty: 90 TABLET | Refills: 0 | Status: SHIPPED | OUTPATIENT
Start: 2020-02-26 | End: 2020-03-23 | Stop reason: SDUPTHER

## 2020-02-26 RX ORDER — QUETIAPINE FUMARATE 25 MG/1
TABLET, FILM COATED ORAL
Qty: 90 TABLET | Refills: 0 | Status: SHIPPED | OUTPATIENT
Start: 2020-02-26 | End: 2020-03-23 | Stop reason: SDUPTHER

## 2020-02-26 NOTE — PLAN OF CARE
[facility]   PATIENT EVALUATION    Name: Yanni Kaiser  Clinic Number: [id number]    Diagnosis:   Encounter Diagnoses   Name Primary?    Plantar fasciitis - Left Foot     Decreased strength, endurance, and mobility     Ankle weakness     Tightness of left gastrocnemius muscle      Physician: Valentino Orourke DPM  Treatment Orders: PT Eval and Treat    History     Past Medical History:   Diagnosis Date    Allergy     Anemia     Asthma     Back pain     Chronic bronchitis     Cigarette smoker     Depression     Diabetes with neurologic complications     DUB (dysfunctional uterine bleeding) 10/16/2018    GERD (gastroesophageal reflux disease)     High cholesterol     Hyperprolactinemia     Hypertension     Influenza A 01/16/2020    Obese body habitus     Pseudotumor cerebri     Renal manifestation of secondary diabetes mellitus     Respiratory failure     Seizures     Simple endometrial hyperplasia 2018    Sleep apnea     Smoker     Tobacco dependence      Current Outpatient Medications   Medication Sig    acetaZOLAMIDE (DIAMOX) 500 mg CpSR Take 1 capsule (500 mg total) by mouth 2 (two) times daily.    albuterol (PROVENTIL/VENTOLIN HFA) 90 mcg/actuation inhaler Inhale 2 puffs into the lungs every 6 (six) hours as needed. Rescue    albuterol-ipratropium (DUO-NEB) 2.5 mg-0.5 mg/3 mL nebulizer solution Take 3 mLs by nebulization every 6 (six) hours as needed for Wheezing or Shortness of Breath. Rescue    atorvastatin (LIPITOR) 40 MG tablet Take 1 tablet (40 mg total) by mouth once daily.    blood sugar diagnostic Strp 1 each by Misc.(Non-Drug; Combo Route) route 4 (four) times daily before meals and nightly. ACCU CHEK ELVIA PLUS METER    blood-glucose meter (ACCU-CHEK ELVIA PLUS METER) Misc TEST FOUR TIMES DAILY BEFORE MEALS AND EVERY NIGHT    budesonide-formoterol 160-4.5 mcg (SYMBICORT) 160-4.5 mcg/actuation HFAA Inhale 2 puffs into the lungs every 12 (twelve)  hours. Controller    diclofenac sodium (VOLTAREN) 1 % Gel Apply 2 g topically once daily.    fluticasone propionate (FLONASE) 50 mcg/actuation nasal spray 1 spray (50 mcg total) by Each Nare route once daily.    hydrOXYzine (ATARAX) 50 MG tablet     lactulose (CHRONULAC) 10 gram/15 mL solution TAKE 15 MLS BY MOUTH THREE TIMES DAILY AS NEEDED    lancets (ACCU-CHEK SOFTCLIX LANCETS) Misc 1 Device by Misc.(Non-Drug; Combo Route) route 4 (four) times daily.    levocetirizine (XYZAL) 5 MG tablet TAKE 1 TABLET(5 MG) BY MOUTH EVERY EVENING    losartan (COZAAR) 100 MG tablet TAKE 1 TABLET(100 MG) BY MOUTH EVERY DAY    meloxicam (MOBIC) 15 MG tablet TAKE 1 TABLET(15 MG) BY MOUTH DAILY AS NEEDED FOR HEADACHE    metFORMIN (GLUCOPHAGE-XR) 500 MG 24 hr tablet TAKE 2 TABLETS(1000 MG) BY MOUTH TWICE DAILY WITH MEALS    methylPREDNISolone (MEDROL DOSEPACK) 4 mg tablet use as directed    montelukast (SINGULAIR) 10 mg tablet TAKE 1 TABLET(10 MG) BY MOUTH EVERY EVENING    ondansetron (ZOFRAN) 8 MG tablet Take 1 tablet (8 mg total) by mouth every 8 (eight) hours as needed for Nausea.    oxyCODONE-acetaminophen (PERCOCET) 5-325 mg per tablet Take 1 tablet by mouth every 12 (twelve) hours as needed for Pain.    pantoprazole (PROTONIX) 40 MG tablet Take 40 mg by mouth once daily.     prucalopride 2 mg Tab Take one tablet by mouth once daily.    QUEtiapine (SEROQUEL) 25 MG Tab TAKE 1 TABLET(25 MG) BY MOUTH EVERY EVENING    semaglutide (OZEMPIC) 1 mg/dose (2 mg/1.5 mL) PnIj Inject 1 mg subq weekly.    sumatriptan (IMITREX) 50 MG tablet Take 1 tab at onset of headaches.  If no improvement in 2 hours, take another.  Do not take more than 2 tabs in 24 hours.    topiramate (TOPAMAX) 100 MG tablet TAKE 3 TABLETS BY MOUTH TWICE DAILY    traMADol (ULTRAM-ER) 100 MG Tb24 Take 1 tablet (100 mg total) by mouth daily as needed.    triamcinolone acetonide 0.1% (KENALOG) 0.1 % ointment Apply topically 2 (two) times daily. for 7 days     venlafaxine (EFFEXOR-XR) 75 MG 24 hr capsule TAKE 1 CAPSULE(75 MG) BY MOUTH EVERY EVENING     No current facility-administered medications for this visit.      Review of patient's allergies indicates:  No Known Allergies    Precautions: standard    Evaluation Date: 2/26/20  Start Time: 1545  Stop Time: 1635  Visit # authorized: 1/1  Authorization period: 3/25/20  Plan of care expiration: 5/26/20  MD referral: Valentino Orourke DPM    Hx of present illness: Pain began a few ago years ago B feet insidious onset. Pt came to PT 1-2x, did ice and pills and eventually had surgery (fasciotomy on the L 11/18/20). Pt has not been to PT since the surgery as she was in the hospital with the flu.      Subjective     Yanni Kaiser states she has B foot pain that is bad with first step in the morning and with walking. She has been going on walking trail 5 laps/1 mile with no issue but recently her feet started hurting more from going to [employer]. Patient reports she didn't do much PT prior to fasciotomy in November and was supposed to start again the month after, but ended up in the hospital with the flu. Patient reports she knows her L side is pretty weak and it has been that way since she started having seizures in 2002. She is unsure why her seizures began and she has had about 8 spinal taps to relive fluid from behind her eyes.    Occupation: none    Pain:  Location: B arch, heel and front  Description: aching  Activities Which Increase Pain: walking, first step in the morning   Activities Which Decrease Pain: tylenol   Pain Scale: 9/10 now 10/10 at worst 0/10 at best    Physical Therapy Goals: have less pain      Objective     Observation: (standing) Patient ambulated into clinic with no AD, in diabetic tennis shoes    Posture: pes planus, B knee hyperextension    Gait: B knee hyperextension L>R    SLS   L unable   R 3 seconds    Ankle Active/Passive ROM: (measured in degrees)   Ankle RLE LLE  "  Dorsiflexion with knees straight WNL Lacking 5/ 5   Great toe extension WNL WNL   Plantarflexion WNL 50/60 *pain at top of foot   Inversion WNL 20/30   Eversion WNL 10/20      Strength: (graded 1-5 out of 5)    RLE LLE   Hip flexion:  4+/5 3+/5   Hip ER 5/5 3/5   Hip IR 5/5 3/5   Knee extension: 5/5 3+/5   Ankle DF: 5/5 3-/5   Posterior fibers of Gluteus medius: 5/5 3-/5   Ankle IV: 5/5 3-/5   Ankle EV: 5/5 3+/5   Knee flexion:  5/5 3+/5   Ankle PF 4/5 3/5   Hip extension 5/5 3-/5       Joint Mobility: good    Palpation: hypersensitive to light touch from great toe to heel on LLE      PT reviewed FOTO scores for Yanni Kaiser on 20  FOTO score: 68% limited    Functional Limitations Reports - G Codes  Category: mobility  Tool: FOTO      Current ():  CL  Goal (): CK      TREATMENT time separate from evaluation    Time In: 1607  Time Out 1635    Therapeutic exercise: Yanni received therapeutic exercises to develop strength and endurance, flexibility for 28 minutes includin way ankle YTB x20  Great toe stretch 2x30" B  SLR 2x5 AAROM  Bridges 2x10  Towel scrunches x2 min B    Modalities: Pt. received cold pack x 5 min. to B feet following treatment.    Pt. Education: Instructed pt. regarding: proper technique with all exercises, body/gait mechanics, diagnosis, prognosis, goals, and POC. Pt demo good understanding of the education provided. Yanni demonstrated good return demonstration of activities. No cultural, environmental, or spiritual barriers identified to treatment or learning.        Assessment   Patient is a 49 y.o. female referred to outpatient physical therapy who presents with a PT diagnosis of B foot pain/ankle weakness demonstrating joint dysfunction and functional limitation as described below. Level of complexity is high;  based on patient's past medical history including the below co-morbidities and personal factors; functional limitations, and clinical presentation " directly impacting his/her plan of care. Pt demonstrates good rehab potential. Pt will benefit from physcial therapy services in order to maximize pain free functional mobility. The following goals were discussed with the patient and patient is in agreement with them as to be addressed in the treatment plan. Pt was given a HEP consisting of towel scrunches, great toe st, 4 way ankle, and bridges. Pt verbally understood the instructions as they were given and demonstrated proper form and technique during therapy. Pt was advised to perform these exercises free of pain, and to stop performing them if pain occurs.       History  Co-morbidities and personal factors that may impact the plan of care Examination  Body Structures and Functions, activity limitations and participation restrictions that may impact the plan of care Clinical Presentation   Decision Making/ Complexity Score   Co-morbidities:   Obesity, hx of foot surgery, epilepsy, CKD, SHARATH, back pain, diabetes            Personal Factors:   Not working, depressive hx Body Regions: B feet, low back, L side    Body Systems: musculoskeletal, genitourinary      Activity limitations: standing, walking      Participation Restrictions: exercise       unstable   high       Medical necessity is demonstrated by the following IMPAIRMENTS/PROBLEM LIST:   1) Pain limiting function   2) Gait abnormality   3) Hip/knee/ankle weakness   4) Decreased gastroc/soleus flexibility   5) Decreased ankle ROM   6) Lack of HEP    GOALS:   Short Term Goals:  4 weeks  1.Patient will be proficient and compliant with HEP.  2. Decrease pain at worst to no greater than 8/10.  3. Patient will demonstrate an increase of >/= 1/2 MMT LLE in order to improve functional mobility.      Long Term Goals: 12 weeks  1. Patient will demonstrate an increase of >/= 1 MMT LLE in order to improve functional mobility.  2. Patient will demonstrate full L ankle AROM in order to improve functional mobility.  3.  Patient will be </= 51% limited in mobility according to FOTO.    Plan     Pt will be treated by physical therapy 2 times a week for 12 weeks for Pt education, HEP, therapeutic exercises, neuromuscular re-education, manual therapy, dry needling; and modalities prn to achieve established goals. Yanni may at times be seen by a PTA as part of the Rehab Team.     I certify the need for these services furnished under this plan of treatment and while under my care.  ______________________________ Physician/Referring Practitioner  Date of Signature    Hermila Steiner, PT, DPT

## 2020-03-01 NOTE — PROGRESS NOTES
Patient is status post plantar fasciectomy.  She is having some issues with scar tissue.  She will begin physical therapy topical and begin a Medrol Dosepak, topical anti-inflammatory medication.  Follow-up in 6 weeks.

## 2020-03-02 ENCOUNTER — HOSPITAL ENCOUNTER (OUTPATIENT)
Dept: RADIOLOGY | Facility: HOSPITAL | Age: 50
Discharge: HOME | End: 2020-03-02
Attending: PODIATRIST
Payer: MEDICARE

## 2020-03-02 ENCOUNTER — TELEPHONE (OUTPATIENT)
Dept: PODIATRY | Facility: CLINIC | Age: 50
End: 2020-03-02

## 2020-03-02 DIAGNOSIS — M72.2 PLANTAR FASCIITIS: ICD-10-CM

## 2020-03-02 DIAGNOSIS — M76.60 ACHILLES TENDINITIS, UNSPECIFIED LATERALITY: ICD-10-CM

## 2020-03-02 PROCEDURE — 73610 XR ANKLE COMPLETE 3 VIEW BILATERAL: ICD-10-PCS | Mod: 26,50,HCNC, | Performed by: RADIOLOGY

## 2020-03-02 PROCEDURE — 73610 X-RAY EXAM OF ANKLE: CPT | Mod: TC,50,HCNC

## 2020-03-02 PROCEDURE — 73610 X-RAY EXAM OF ANKLE: CPT | Mod: 26,50,HCNC, | Performed by: RADIOLOGY

## 2020-03-02 NOTE — TELEPHONE ENCOUNTER
----- Message from Smita Macias sent at 3/2/2020 11:02 AM CST -----  Contact: self  Pt would like to know if she can do her x-rays today. Her appt is on 03/10/2020. Asking for a call back.        Contact info  [phone]

## 2020-03-03 ENCOUNTER — PES CALL (OUTPATIENT)
Dept: ADMINISTRATIVE | Facility: CLINIC | Age: 50
End: 2020-03-03

## 2020-03-03 ENCOUNTER — LAB VISIT (OUTPATIENT)
Dept: LAB | Facility: HOSPITAL | Age: 50
End: 2020-03-03
Payer: MEDICARE

## 2020-03-03 ENCOUNTER — OFFICE VISIT (OUTPATIENT)
Dept: GASTROENTEROLOGY | Facility: CLINIC | Age: 50
End: 2020-03-03
Payer: MEDICARE

## 2020-03-03 VITALS
WEIGHT: 293 LBS | SYSTOLIC BLOOD PRESSURE: 123 MMHG | DIASTOLIC BLOOD PRESSURE: 87 MMHG | HEART RATE: 66 BPM | HEIGHT: 60 IN | BODY MASS INDEX: 57.52 KG/M2

## 2020-03-03 DIAGNOSIS — R11.0 NAUSEA: ICD-10-CM

## 2020-03-03 DIAGNOSIS — K59.09 CHRONIC CONSTIPATION: Primary | ICD-10-CM

## 2020-03-03 DIAGNOSIS — R07.9 CHEST PAIN, UNSPECIFIED TYPE: ICD-10-CM

## 2020-03-03 DIAGNOSIS — D50.9 IRON DEFICIENCY ANEMIA, UNSPECIFIED IRON DEFICIENCY ANEMIA TYPE: ICD-10-CM

## 2020-03-03 DIAGNOSIS — R14.0 BLOATING: ICD-10-CM

## 2020-03-03 DIAGNOSIS — R10.9 ABDOMINAL PAIN, UNSPECIFIED ABDOMINAL LOCATION: ICD-10-CM

## 2020-03-03 DIAGNOSIS — R13.10 DYSPHAGIA, UNSPECIFIED TYPE: ICD-10-CM

## 2020-03-03 DIAGNOSIS — K21.9 GASTROESOPHAGEAL REFLUX DISEASE, ESOPHAGITIS PRESENCE NOT SPECIFIED: ICD-10-CM

## 2020-03-03 DIAGNOSIS — R68.81 EARLY SATIETY: ICD-10-CM

## 2020-03-03 LAB
BASOPHILS # BLD AUTO: 0.06 K/UL (ref 0–0.2)
BASOPHILS NFR BLD: 1.7 % (ref 0–1.9)
DIFFERENTIAL METHOD: ABNORMAL
EOSINOPHIL # BLD AUTO: 0.3 K/UL (ref 0–0.5)
EOSINOPHIL NFR BLD: 7.6 % (ref 0–8)
ERYTHROCYTE [DISTWIDTH] IN BLOOD BY AUTOMATED COUNT: 14.2 % (ref 11.5–14.5)
FERRITIN SERPL-MCNC: 8 NG/ML (ref 20–300)
HCT VFR BLD AUTO: 41.5 % (ref 37–48.5)
HGB BLD-MCNC: 12.8 G/DL (ref 12–16)
IMM GRANULOCYTES # BLD AUTO: 0.01 K/UL (ref 0–0.04)
IMM GRANULOCYTES NFR BLD AUTO: 0.3 % (ref 0–0.5)
IRON SERPL-MCNC: 48 UG/DL (ref 30–160)
LYMPHOCYTES # BLD AUTO: 1.6 K/UL (ref 1–4.8)
LYMPHOCYTES NFR BLD: 45.3 % (ref 18–48)
MCH RBC QN AUTO: 29.9 PG (ref 27–31)
MCHC RBC AUTO-ENTMCNC: 30.8 G/DL (ref 32–36)
MCV RBC AUTO: 97 FL (ref 82–98)
MONOCYTES # BLD AUTO: 0.4 K/UL (ref 0.3–1)
MONOCYTES NFR BLD: 12.2 % (ref 4–15)
NEUTROPHILS # BLD AUTO: 1.2 K/UL (ref 1.8–7.7)
NEUTROPHILS NFR BLD: 32.9 % (ref 38–73)
NRBC BLD-RTO: 0 /100 WBC
PLATELET # BLD AUTO: 309 K/UL (ref 150–350)
PMV BLD AUTO: 8.6 FL (ref 9.2–12.9)
RBC # BLD AUTO: 4.28 M/UL (ref 4–5.4)
SATURATED IRON: 15 % (ref 20–50)
TOTAL IRON BINDING CAPACITY: 314 UG/DL (ref 250–450)
TRANSFERRIN SERPL-MCNC: 212 MG/DL (ref 200–375)
WBC # BLD AUTO: 3.53 K/UL (ref 3.9–12.7)

## 2020-03-03 PROCEDURE — 99499 UNLISTED E&M SERVICE: CPT | Mod: S$GLB,,, | Performed by: NURSE PRACTITIONER

## 2020-03-03 PROCEDURE — 99499 RISK ADDL DX/OHS AUDIT: ICD-10-PCS | Mod: S$GLB,,, | Performed by: NURSE PRACTITIONER

## 2020-03-03 PROCEDURE — 83540 ASSAY OF IRON: CPT | Mod: HCNC

## 2020-03-03 PROCEDURE — 3074F PR MOST RECENT SYSTOLIC BLOOD PRESSURE < 130 MM HG: ICD-10-PCS | Mod: HCNC,CPTII,S$GLB, | Performed by: NURSE PRACTITIONER

## 2020-03-03 PROCEDURE — 3079F DIAST BP 80-89 MM HG: CPT | Mod: HCNC,CPTII,S$GLB, | Performed by: NURSE PRACTITIONER

## 2020-03-03 PROCEDURE — 99215 OFFICE O/P EST HI 40 MIN: CPT | Mod: HCNC,S$GLB,, | Performed by: NURSE PRACTITIONER

## 2020-03-03 PROCEDURE — 3008F BODY MASS INDEX DOCD: CPT | Mod: HCNC,CPTII,S$GLB, | Performed by: NURSE PRACTITIONER

## 2020-03-03 PROCEDURE — 99999 PR PBB SHADOW E&M-EST. PATIENT-LVL III: CPT | Mod: PBBFAC,HCNC,, | Performed by: NURSE PRACTITIONER

## 2020-03-03 PROCEDURE — 36415 COLL VENOUS BLD VENIPUNCTURE: CPT | Mod: HCNC

## 2020-03-03 PROCEDURE — 3074F SYST BP LT 130 MM HG: CPT | Mod: HCNC,CPTII,S$GLB, | Performed by: NURSE PRACTITIONER

## 2020-03-03 PROCEDURE — 85025 COMPLETE CBC W/AUTO DIFF WBC: CPT | Mod: HCNC

## 2020-03-03 PROCEDURE — 82728 ASSAY OF FERRITIN: CPT | Mod: HCNC

## 2020-03-03 PROCEDURE — 3079F PR MOST RECENT DIASTOLIC BLOOD PRESSURE 80-89 MM HG: ICD-10-PCS | Mod: HCNC,CPTII,S$GLB, | Performed by: NURSE PRACTITIONER

## 2020-03-03 PROCEDURE — 99215 PR OFFICE/OUTPT VISIT, EST, LEVL V, 40-54 MIN: ICD-10-PCS | Mod: HCNC,S$GLB,, | Performed by: NURSE PRACTITIONER

## 2020-03-03 PROCEDURE — 99999 PR PBB SHADOW E&M-EST. PATIENT-LVL III: ICD-10-PCS | Mod: PBBFAC,HCNC,, | Performed by: NURSE PRACTITIONER

## 2020-03-03 PROCEDURE — 3008F PR BODY MASS INDEX (BMI) DOCUMENTED: ICD-10-PCS | Mod: HCNC,CPTII,S$GLB, | Performed by: NURSE PRACTITIONER

## 2020-03-03 NOTE — PATIENT INSTRUCTIONS
H. Pylori testing  This stool specimen is to check if you have the H. Pylori bacterial infection in your digestive tract.    Stool H. Pylori antigen needs to be done off the following medications for the following amount of time to ensure accuracy:    1. Off all Antibiotics for 4 (Four) weeks before stool collection.     2. Off all Proton Pump Inhibitors medications for 2 (Two) weeks before collecting stool for H. Pylori Antigen:    Nexium (esomeprazole)  Prilosec (omeprazole)   Protonix (pantoprazole)  Prevacid (lansoprazole)  Aciphex (rabeprazole)  Dexilant (dexlansoprazole)   Zegerid     3. Off all H2 blockers medications for 2 (Two) weeks before collecting stool for H. Pylori Antigen:    Zantac (ranitidine)  Tagamet (cimetadine)  Axid (nizatidine)   Pepcid (famotidine)    4. Off Pepto-Bismol for 4 (Four) weeks prior to collecting stool for H. Pylori Antigen.    You may  take Tums, Mylanta, or Maalox for reflux signs and symptoms as needed until stools collected.    Once you have turned in your specimen you may resume taking your medications as directed.    See GI dietitian to work on gastroparesis diet .    Start a multivitamin daily.    Move forward with your appointment with the sleep specialist, and your EGD/pH testing.    Continue protonix 40 mg twice daily.    We have re ordered the SIBO breath test to test for small bowel bacterial overgrowth.    I have ordered labs to check on the anemia.     Since your insurance does not cover prucalopride (Motegrity) for constipation here are a few other options to help with the cost of the medication: when you start prucalopride, stop the linzess.    1. You can call  [phone] or look for information at [email or web address]://[email or web address].Apisphere for prescription coverage help.    2.  It can also be purchased in [place] at [facility].  The price may be lower at this pharmacy.       [phone].     [address] [facility], [place]   Please let us  know if you want us to fax the script to this pharmacy.     3. You can also contact [employer] [facility] to see if they can assist you with prescription coverage.   You will pay $50 per month for a prescription through this program   [phone]  [email or web address]      Please keep us posted.

## 2020-03-03 NOTE — PROGRESS NOTES
[facility] Consultation Note    Reason for Consult:    Chief Complaint   Patient presents with    Heartburn    Chest Pain    Dysphagia     trouble swallowing    Nausea    Abdominal Pain    Gas     bloating    Constipation    Weight Loss         PCP:   Carlyle Gordillo       Referring MD:  Flako Trejo Md  [address] [place] [address]  [address]  [facility]  [place] [address]    HPI:  Yanni Kaiser is a 49 y.o. female with a PMH of anemia, arthritis, back pain, plantar fascitis, asthma,  anxiety, MDD, DM2 , HTN, migraines, seizures, SHARATH referred to motility clinic for second opinion regarding the following problems:    GERD. Still having some reflux. Unchanged. Not too bad  Retrosternal pyrosis:yes  Regurgitation:yes  Belching:yes bothersome and painful  Sometimes occurs in sleep   Frequency: 2-3 per month on meds   Protonix 40 mg BID   Has not tried gaviscon w alginate as previously advised  Uses tums prn   Sleeps w head of the bed elevated   Does not eat prior to sleep      Chest pain. Not frequent at this point.  Pressure. Occasionally w swallowing. unchanged   Frequency: few per week   Improvement with belching  No improvement with gas ex  Cardiology jameson negative.  Stress test negative. Cards does not think this is cardiac     Dysphagia.  Still w difficulty swallowing. Unchanged.    Problems with solids:mostly  Location: upper esophagus  Frequency:  Few per month, not really getting stuck that often anymore    Unable to tolerate manometry catheter after it was placed endoscopically     Nausea.  Every other day.  Early satiety. daily  No vomiting    Diet: Following GP diet - still needs to work on it. Not seen by GI dietitian     MEDS:   zofran 8mg -  Every other day  No improvement with FDgard.     Gastroparesis diagnosed:  Possibly in 2016 with smartpill    Number of GP related hospitalization : none  Number of GP related ED visit:  "none    Curenlty on following medications that may affect gastric emptying:   Narcotics (morphine, oxycodone, tapentadol, less with tramadol): no longer taking percocet    DM 2. Last A1C  5.8  Dx 10 year ago.   BS running up to 90s.   Previously followed by endocrinologist.   Taking metformin 1,000 mg BID  Started ozempic for weight loss in May.  Not sure if this made symptoms worse, but not gettign much better     Abdominal pain. Occurs on occasion.   LUQ pain previously resolved after course of abx for suspected diverticulitis.  No longer using meloxicam 15 mg HS   No improvement with FDgard    Gas and bloating. still occurs. Unchanged.   No improvement with eliminating dairy  No art sugars, dairy, probiotic  States she did not receive the SIBO breath test in the mail    Constipation. Improved.  No longer gets diarrhea   [place] 2 or 3, previously type 1  BM 1 per week   Still feels that is not evacuating competely   MEDS:   Linzess 290 mcg  caused diarrhea in the past, but now takes it daily w/o diarrhea.    No longer taking miralax bc of diarrhea   Insurance would not cover prucalopride   Did not take lactulose as prescribed.    Weight fluctuates. Has lost about 20 lbs since 12/2019 on ozempic. Protein low. Is not taking protein shakes TID as advised d/t "tasted bad".    H/o h. Pylori in 2016. Unsure if treated in 2016. Hp stool + 2018. Treated w doxy, flagyl, pepto and ppi x 14 days recently. Hp + 2019.  Completed the entire course. Has not complete h. Pylori stool testing as ordered in 12/2019.    Anemia. No over bleeding.  No vaginal bleeding. EGD/colon negative.    Diverticulitis on CT in 11/2018. Treated with abx. Subsequent colonoscopy okay. Maybe this made sx better     Fatty liver.     Family history of colon cancer. Mother dx in 50s, MGM dx ?    Anxiety. Depression.   Some improvement with seroquel 25 mg HS, also takes effexor 75 mg HS (for HAs).   Followed by PCP. Has seen psych in the past. "     Insomnia. Not on meds. Diagnosed with SHARATH. Not on cpap/does not like it. Has appt coming up on 3/24/20 with sleep specialist.     Migraines. Seizures. Some improvement with tompiramate 300 mg  BID,  imitrex 50 mg PRN, also taking effexor 75 mg HS; no longer on  aimovig once monthly inj. No longer on meloxicam 15 mg daily. Per neruology.    Plantar fascitis. No longer taking percocet 5-325 mg.  Had foot surgery w imrpovemetn. Followed by pain management and podietry    Joint pain. Joint swelling. Knees. Hands. Back.  Seen by rheumatology w/ negative work up.    Denies diarrhea, BRBPR, melena    Total visit time was 40 minutes, more than 50% of which was spent in face-to-face counseling with patient regarding symptoms, diagnostic results, prognosis, risks and benefits of treatment options, instructions for management, importance of compliance with chosen treatment options, risk factor reduction, stress reduction, coping strategies.      Previous Studies:   EGD 11/5/2019: - Normal esophagus (P/D bx-). Z-line regular, 40 cm from the incisors. Normal gastroesophageal junction. Normal stomach.  Normal examined duodenum. FLIP imaging performed, consistent with normal esophageal contractility.  Esophagram 06/12/2019:  Mild esophageal dysmotility.  13 mm barium tablet passed into the stomach after multiple sips of water and mildly delayed transit the GEJ.  Colonoscopy 1/14/19: EPTC. Hemorrhoids. 3 mm cecal polyp (TA). Med lipoma 14 mm in AC. Recto sigmoid and cecal tics Two 3-5 mm SC polyps (hp). Multiple small polyps in rectum and recto sigmoid colon (hp). Rpt 5 yrs.   EGD 1/14/19: nl esophagus (-). Clear gastric fluid. Erosive gastropathy (-). Nl duodenum (-).   Ct a/p 11/6/18: Slight pericolonic fat stranding at the junction of the sigmoid and descending colon concerning for mild uncomplicated diverticulitis or epiploic appendagitis.Other incidental findings as above including cholecystectomy, mild focal fatty  infiltration liver, tiny splenule, and osseous degenerative changes.  EGD 12/9/16: nl esophagus. Gastric erythema and granularity (gastritis + hpylori). Nl duodenum.   SmartPill 10/5/16: GET delayed 5 hr 50 min, sbtt nl 5 hr 8min, CTT nl 12 hr 13 min, SLBTT nl 19 hr 22 min, WGTT nl 25 hr 13 min  CT abd/pelvis 4/29/16: unremarkable  * colon 2016:?  Abd us 3/11/08: technically limited study. fatty liver. s/p nichole  Abd us 3/5/08: GB stones w/ + bullards sign.     Labs:  10/10/18: hgb low 11.6, RDW high 16.1, BMP unremarkable  8/8/18 : vit d low 12  7/11/18: TSH nl  6/19/18: hgb low 11.3, RDW high 16.8, cl high 113, albumin low 3.1  CCP ab IgG-  RF-  Taylor-  Sjogrens taylor -  Hep A ab IgG+  Hep c ab-  Hep b s ab-  Hep b c ab-  Hep b sag-  Quant gold tb -    Relevant Surgeries:  Cholecystectomy (3/7/2008)    * per pt report    ROS:  ROS   Constitutional: no fever, + chills, +night sweats, no weight loss  ENT: + congestion, +  rhinorrhea, + chronic sinus problems  CV: +Chest pain, no palpitations  Pulm: + cough, + shortness of breath  Ophtho: no blurry vision, no eye redness  GI: see HPI  Derm: No rash  Heme: No lymphadenopathy, no bruising  MSK: no joint pain, no joint swelling, no Raynauds  : no dysuria, no frequent urination, no blood in urine  Endo: no hot or cold intolerance  Neuro: no dizziness, no syncope, no seizure  Psych: no anxiety, no depression        Medical History:   Past Medical History:   Diagnosis Date    Allergy     Anemia     Asthma     Back pain     Chronic bronchitis     Cigarette smoker     Depression     Diabetes with neurologic complications     DUB (dysfunctional uterine bleeding) 10/16/2018    GERD (gastroesophageal reflux disease)     High cholesterol     Hyperprolactinemia     Hypertension     Influenza A 01/16/2020    Obese body habitus     Pseudotumor cerebri     Renal manifestation of secondary diabetes mellitus     Respiratory failure     Seizures     Simple endometrial  hyperplasia 2018    Sleep apnea     Smoker     Tobacco dependence         Surgical History:   Past Surgical History:   Procedure Laterality Date    BREAST CYST ASPIRATION       SECTION      X 1    CHOLECYSTECTOMY      COLONOSCOPY      COLONOSCOPY N/A 2019    Procedure: COLONOSCOPY;  Surgeon: Jagruti Sweeney MD;  Location: [facility] ([address] [place] [address]);  Service: Endoscopy;  Laterality: N/A;  BMI is 63/gastroparesis/3 days full liquid diet 1 day clear liquid see telephone encounter by Ciara Pinto [facility]    ESOPHAGEAL MANOMETRY WITH MEASUREMENT OF IMPEDANCE N/A 2019    Procedure: MANOMETRY-ESOPHAGEAL-WITH IMPEDANCE;  Surgeon: aJgruti Sweeney MD;  [facility]  Service: Endoscopy;  Laterality: N/A;  Hold Narcotics x 1 days   Hold TCA x 1 days  Propofol only. No fentanyl or benzodiazepine during sedation. If additional sedation needed, discuss with Dr. Sweeney.  10/29 - pt confirmed appt    ESOPHAGOGASTRODUODENOSCOPY N/A 2019    Procedure: EGD (ESOPHAGOGASTRODUODENOSCOPY);  Surgeon: Jagruti Sweeney MD;  Location: [facility] ([address] [place] [address]);  Service: Endoscopy;  Laterality: N/A;  BMI is 63/gastroparesis/3 days full liquid diet 1 day clear liquid see telephone encounter by Ciara Pinto [facility]    ESOPHAGOGASTRODUODENOSCOPY N/A 2019    Procedure: ESOPHAGOGASTRODUODENOSCOPY (EGD);  Surgeon: Jagruti Sweeney MD;  Location: [facility] ([address] [place] [address]);  Service: Endoscopy;  Laterality: N/A;  EGD with EndoFlip /+/- Botox  2nd floor for gastroparesis/BMI 63 **367lbs**  Bariatric Stretcher needed  Manometry probe to be placed endoscopically during EGD procedure  Due to change in protocol, Full liquid diet for 3 days/ 1 day of clear liquids  Hi    FOOT FRACTURE SURGERY Left     HYSTEROSCOPY WITH DILATION AND CURETTAGE OF UTERUS N/A 10/16/2018    KJB    PLANTAR FASCIOTOMY Left 2019    Procedure: FASCIOTOMY, PLANTAR;  Surgeon: Valentino Orourke DPM;  Location[facility] OR [address] [place] [address];  Service:  Podiatry;  Laterality: Left;    RETINAL LASER PROCEDURE Left     SINUS SURGERY      UPPER GASTROINTESTINAL ENDOSCOPY          Family History:   Family History   Problem Relation Age of Onset    Hypertension Mother     Diabetes Mother     Colon cancer Mother 50    Colon polyps Mother     Heart disease Father     COPD Father     Heart attack Father     Hypertension Father     Ulcers Father     Cancer Maternal Grandmother     Breast cancer Maternal Grandmother     Colon cancer Maternal Grandmother     Colon polyps Maternal Grandmother     No Known Problems Paternal Grandmother     No Known Problems Brother     No Known Problems Maternal Aunt     No Known Problems Maternal Uncle     No Known Problems Paternal Aunt     No Known Problems Paternal Uncle     No Known Problems Maternal Grandfather     No Known Problems Paternal Grandfather     Celiac disease Neg Hx     Cirrhosis Neg Hx     Crohn's disease Neg Hx     Cystic fibrosis Neg Hx     Esophageal cancer Neg Hx     Hemochromatosis Neg Hx     Inflammatory bowel disease Neg Hx     Irritable bowel syndrome Neg Hx     Liver cancer Neg Hx     Liver disease Neg Hx     Rectal cancer Neg Hx     Stomach cancer Neg Hx     Ulcerative colitis Neg Hx     Jonnie's disease Neg Hx     Tuberculosis Neg Hx     Lymphoma Neg Hx     Scleroderma Neg Hx     Rheum arthritis Neg Hx     Melanoma Neg Hx     Multiple sclerosis Neg Hx     Psoriasis Neg Hx     Lupus Neg Hx     Skin cancer Neg Hx     Amblyopia Neg Hx     Blindness Neg Hx     Cataracts Neg Hx     Glaucoma Neg Hx     Macular degeneration Neg Hx     Retinal detachment Neg Hx     Strabismus Neg Hx     Stroke Neg Hx     Thyroid disease Neg Hx         Social History:   Social History     Socioeconomic History    Marital status:      Spouse name: Not on file    Number of children: Not on file    Years of education: Not on file    Highest education level: Not on file    Occupational History    Not on file   Social Needs    Financial resource strain: Not on file    Food insecurity:     Worry: Not on file     Inability: Not on file    Transportation needs:     Medical: Not on file     Non-medical: Not on file   Tobacco Use    Smoking status: Current Every Day Smoker     Packs/day: 1.00     Years: 27.00     Pack years: 27.00     Types: Cigarettes     Start date: 1/1/1992    Smokeless tobacco: Never Used    Tobacco comment: 1/2 a pack if her days are good   Substance and Sexual Activity    Alcohol use: Yes     Alcohol/week: 0.0 standard drinks     Comment: socially    Drug use: No    Sexual activity: Yes     Partners: Male     Birth control/protection: None   Lifestyle    Physical activity:     Days per week: Not on file     Minutes per session: Not on file    Stress: Not on file   Relationships    Social connections:     Talks on phone: Not on file     Gets together: Not on file     Attends [employer] service: Not on file     Active member of club or organization: Not on file     Attends meetings of clubs or organizations: Not on file     Relationship status: Not on file   Other Topics Concern    Not on file   Social History Narrative    Not on file        Review of patient's allergies indicates:  No Known Allergies    Current Outpatient Medications   Medication Sig Dispense Refill    acetaZOLAMIDE (DIAMOX) 500 mg CpSR Take 1 capsule (500 mg total) by mouth 2 (two) times daily. 360 capsule 1    albuterol (PROVENTIL/VENTOLIN HFA) 90 mcg/actuation inhaler Inhale 2 puffs into the lungs every 6 (six) hours as needed. Rescue 54 g 0    albuterol-ipratropium (DUO-NEB) 2.5 mg-0.5 mg/3 mL nebulizer solution Take 3 mLs by nebulization every 6 (six) hours as needed for Wheezing or Shortness of Breath. Rescue 100 mL 3    atorvastatin (LIPITOR) 40 MG tablet Take 1 tablet (40 mg total) by mouth once daily. 90 tablet 3    blood sugar diagnostic Strp 1 each by Misc.(Non-Drug;  Combo Route) route 4 (four) times daily before meals and nightly. ACCU CHEK ELVIA PLUS METER 200 each 3    blood-glucose meter (ACCU-CHEK ELVIA PLUS METER) Misc TEST FOUR TIMES DAILY BEFORE MEALS AND EVERY NIGHT 90 each 1    budesonide-formoterol 160-4.5 mcg (SYMBICORT) 160-4.5 mcg/actuation HFAA Inhale 2 puffs into the lungs every 12 (twelve) hours. Controller 1 Inhaler 5    diclofenac sodium (VOLTAREN) 1 % Gel Apply 2 g topically once daily. 100 g 3    fluticasone propionate (FLONASE) 50 mcg/actuation nasal spray 1 spray (50 mcg total) by Each Nare route once daily. 15.8 mL 2    hydrOXYzine (ATARAX) 50 MG tablet       lactulose (CHRONULAC) 10 gram/15 mL solution TAKE 15 MLS BY MOUTH THREE TIMES DAILY AS NEEDED (Patient not taking: Reported on 3/3/2020) 473 mL 6    lancets (ACCU-CHEK SOFTCLIX LANCETS) Misc 1 Device by Misc.(Non-Drug; Combo Route) route 4 (four) times daily. 200 each 3    levocetirizine (XYZAL) 5 MG tablet TAKE 1 TABLET(5 MG) BY MOUTH EVERY EVENING 90 tablet 0    losartan (COZAAR) 100 MG tablet TAKE 1 TABLET(100 MG) BY MOUTH EVERY DAY 90 tablet 0    meloxicam (MOBIC) 15 MG tablet TAKE 1 TABLET(15 MG) BY MOUTH DAILY AS NEEDED FOR HEADACHE 90 tablet 1    metFORMIN (GLUCOPHAGE-XR) 500 MG 24 hr tablet TAKE 2 TABLETS(1000 MG) BY MOUTH TWICE DAILY WITH MEALS 360 tablet 0    methylPREDNISolone (MEDROL DOSEPACK) 4 mg tablet use as directed 1 Package 1    montelukast (SINGULAIR) 10 mg tablet TAKE 1 TABLET(10 MG) BY MOUTH EVERY EVENING 30 tablet 0    ondansetron (ZOFRAN) 8 MG tablet Take 1 tablet (8 mg total) by mouth every 8 (eight) hours as needed for Nausea. 90 tablet 5    oxyCODONE-acetaminophen (PERCOCET) 5-325 mg per tablet Take 1 tablet by mouth every 12 (twelve) hours as needed for Pain. 40 tablet 0    pantoprazole (PROTONIX) 40 MG tablet Take 40 mg by mouth once daily.   3    prucalopride 2 mg Tab Take 2 mg by mouth once daily. 90 tablet 3    QUEtiapine (SEROQUEL) 25 MG Tab TAKE 1  TABLET(25 MG) BY MOUTH EVERY EVENING 90 tablet 0    semaglutide (OZEMPIC) 1 mg/dose (2 mg/1.5 mL) PnIj Inject 1 mg subq weekly. 3 mL 3    sumatriptan (IMITREX) 50 MG tablet Take 1 tab at onset of headaches.  If no improvement in 2 hours, take another.  Do not take more than 2 tabs in 24 hours. 9 tablet 5    topiramate (TOPAMAX) 100 MG tablet TAKE 3 TABLETS BY MOUTH TWICE DAILY 180 tablet 11    traMADol (ULTRAM-ER) 100 MG Tb24 Take 1 tablet (100 mg total) by mouth daily as needed. 14 tablet 0    triamcinolone acetonide 0.1% (KENALOG) 0.1 % ointment Apply topically 2 (two) times daily. for 7 days 30 g 0    venlafaxine (EFFEXOR-XR) 75 MG 24 hr capsule TAKE 1 CAPSULE(75 MG) BY MOUTH EVERY EVENING 90 capsule 3     No current facility-administered medications for this visit.         Objective Findings:  Vital Signs:  /87   Pulse 66   Ht 5' (1.524 m)   Wt (!) 148 kg (326 lb 4.8 oz)   BMI 63.73 kg/m²   Body mass index is 63.73 kg/m².    Physical Exam:  General appearance: alert, cooperative, no distress  HENT: Normocephalic, atraumatic, neck symmetrical, no nasal discharge  Eyes: conjunctivae/corneas clear,  EOM's intact  Lungs: clear to auscultation bilaterally,  Heart: regular rate and rhythm without rub; n  Abdomen: soft, mild upper abd tenderness; bowel sounds normoactive; no organomegaly  Extremities: extremities symmetric; no clubbing, cyanosis, or edema  Integument: Skin color, texture, turgor normal; no rashes; hair distrubution normal  Neurologic: Alert and oriented X 3, normal strength, normal coordination and gait  Psychiatric: no pressured speech; normal affect; no evidence of impaired cognition    Labs:  Lab Results   Component Value Date    WBC 3.53 (L) 03/03/2020    HGB 12.8 03/03/2020    HCT 41.5 03/03/2020    MCV 97 03/03/2020     03/03/2020     Lab Results   Component Value Date    FERRITIN 9 (L) 05/17/2019     Lab Results   Component Value Date     01/18/2020    K 3.9  01/18/2020     (H) 01/18/2020    CO2 18 (L) 01/18/2020     (H) 01/18/2020    BUN 16 01/18/2020    CREATININE 1.2 01/18/2020    CALCIUM 8.7 01/18/2020    PROT 6.8 01/18/2020    ALBUMIN 2.9 (L) 01/18/2020    BILITOT 0.2 01/18/2020    ALKPHOS 114 01/18/2020    AST 24 01/18/2020     (H) 01/18/2020     Lab Results   Component Value Date    TSH 1.229 11/05/2018     Lab Results   Component Value Date    SEDRATE 55 (H) 08/08/2018     Lab Results   Component Value Date    CRP 11.4 (H) 08/08/2018     Lab Results   Component Value Date    HGBA1C 5.6 11/01/2019           Assessment and Plan:  Yanni Kaiser is a 49 y.o. female with a PMH of anemia, arthritis, back pain, plantar fascitis, asthma,  anxiety, MDD, DM2 , HTN, migraines, seizures, SHARATH referred to motility clinic for second opinion regarding the following problems:    GERD. Refractory  Some improvement with prilosec OTC 40 mg once daily in the past.  -Cont pantoprazole 40 mg to BID.   -Tums prn   -Move forward with EGD w BRAVO off PPI +/- prophylactic dilation as scheduled for 4/16/20    Chest pain, sometimes w swallowing. Negative cardiac work up.  EGD w EndoFlip and esophagogram negative   Improvement with belching  Unable to comeplete manometry  -Cont PPI BID    Dysphagia.  EGD w EndoFlip and esophagogram negative. Esophagram with mild esophageal dysmotility; 13 mm barium tablet passed into the stomach after multiple sips of water and mildly delayed transit the GEJ.  Unable to comeplete manometry  -Cont PPI BID  -EGD w BRAVO +/- dilation as scheduled on 4/16/20    Nausea.  Early satiety. No vomiting. DM Gastroparesis (smartPill in 2016 with dalayed  gastric emptying).  EGD with gastric fluid and pill in stomach.   No longer on percocet  Unable to tolerate reglan due to twitching.   Some improvement with phenergan IV  No improvement w FDgard  -Cont zofran 8 mg TID prn   -Cont  gastroparesis diet. Fu w GI dietitian  -Start prucalopride as  previously advised    -Will consider adding neuromodulator    DM type 2. Diagnosed 10 year ago. BS running 90s. Last hba1c 5.8  On metformin 1000 mg BID.   Previously followed by endocrinologist, currently followed by PCP.   -Discuss ozempic use w PCP/endocrinology - this may be havign negative impact on Gi symptoms/motility      Abdominal pain.    Improved - epigastric and LUQ pain   Previous CT with possible diverticulitis, txed w abx - LLQ pain resolved  On seroquel, effexor  No longer on percocet and melaxoicam  No improvement w FDgard     Gas and bloating.   Avoids artificial sugars.  No improvement with lactose elimination  No longer on probiotic.  Previously treated with Augmentin tid x 10days to treat possible diverticulitis.  -Reorder SIBO breath test    Constipation.  Normal colon transit. .  No improvement with miralax once daily  No improvement with  Amitiza ?dose? BID  Insurance does not cover trulance.  Unable to tolerate linzess 290 mcg due to diarrhea; not helping  Unable to tolerate miralax due to diarrhea   PT states insurance will not cover lactulose  -Stop linzess  -Start prucalopride as previously advised. PT to use one of the prescription coverage assistance options.  -Will consider MRI defecography  -Will consider ARM    Possible prolapse. Pt denies at this time   -Will consider MRI Defecogram     Weight fluctuates. Pre albumin/albumin low. Trying to loose weight   Unable to tolerate high protein shakes   -Fu w GI dietitian  -Start MVA daily as previously advised    H. Pylori in 2016. Unsure if treated. EGD negative.    2/12/2019 stools + - doxy/flagyl/pepto/ppix 14 days   10/2019 stool + - Amox, clarith, PPI   -Check stools for h pylori off PPI as previously advised    Anemia. Low hgb. Labs with persistent, mild KASH. EGD/colon negative  -Start iron BID  -Check labs; and consider VCE w ref GI     CT w possible diverticulitis vs epiploic appendagitis. Treated with Augmentin tid x 10days.  Colon  negative.  LUQ pain resolved after treatment.     Fatty liver.   -Defer to ref GI     Family history of colon cancer. Mother dx in 50s, MGM ?dx ?. Colonoscopy with 3 mm cecal TA and hyperplastic polyps.   Repeat in 5 years (2024).    Anxiety. Depression.   Some improvement with seroquel 25 mg HS, also takes effexor 75 mg HS (for HAs).   Has seen psych in the past. Followed by PCP.    Insomnia. Not on meds. Diagnosed with SHARATH. Not on cpap/does not like it.   -Again disucussed the importance of sleep in functional GI disorders.  Will consider seeing a sleep specialist and CBT for sleep  -Fu w sleep as scheduled later this month    Migraines. Seizures.   Some improvement with tompiramate 300 mg  BID, imitrex 50 mg PRN, also taking effexor 75 mg HS, no longer on aimovig once monthly.  No longer on meloxicam 15 mg daily. Per neruology.    Plantar fascitis. Had foot surgery w improvement  No longer taking percocet  -Followed by pain management and podiatry    Joint pain. Joint swelling. Knees. Hands. Back. ESR/CRP high  Seen by rheumatology w/ negative work up.    Follow up in about 4 months (around 7/3/2020) for Motility w Dr. Sweeney.    1. Chronic constipation    2. Iron deficiency anemia, unspecified iron deficiency anemia type    3. Gastroesophageal reflux disease, esophagitis presence not specified    4. Dysphagia, unspecified type    5. Chest pain, unspecified type    6. Nausea    7. Abdominal pain, unspecified abdominal location    8. Bloating    9. Early satiety          Order summary:  Orders Placed This Encounter    CBC auto differential    Iron and TIBC    Ferritin    prucalopride 2 mg Tab         Thank you so much for allowing me to participate in the care of Efrainkayla Pinto, APRN, FNP-C

## 2020-03-04 ENCOUNTER — LAB VISIT (OUTPATIENT)
Dept: LAB | Facility: HOSPITAL | Age: 50
End: 2020-03-04
Attending: INTERNAL MEDICINE
Payer: MEDICARE

## 2020-03-04 DIAGNOSIS — A04.8 H. PYLORI INFECTION: ICD-10-CM

## 2020-03-04 PROCEDURE — 87338 HPYLORI STOOL AG IA: CPT | Mod: HCNC

## 2020-03-05 ENCOUNTER — CLINICAL SUPPORT (OUTPATIENT)
Dept: REHABILITATION | Facility: HOSPITAL | Age: 50
End: 2020-03-05
Attending: PODIATRIST
Payer: MEDICARE

## 2020-03-05 DIAGNOSIS — K59.09 CHRONIC CONSTIPATION: ICD-10-CM

## 2020-03-05 DIAGNOSIS — M62.89 TIGHTNESS OF LEFT GASTROCNEMIUS MUSCLE: ICD-10-CM

## 2020-03-05 DIAGNOSIS — M79.671 PAIN IN BOTH FEET: ICD-10-CM

## 2020-03-05 DIAGNOSIS — R29.898 ANKLE WEAKNESS: ICD-10-CM

## 2020-03-05 DIAGNOSIS — R68.89 DECREASED STRENGTH, ENDURANCE, AND MOBILITY: ICD-10-CM

## 2020-03-05 DIAGNOSIS — M79.672 PAIN IN BOTH FEET: ICD-10-CM

## 2020-03-05 DIAGNOSIS — Z74.09 DECREASED STRENGTH, ENDURANCE, AND MOBILITY: ICD-10-CM

## 2020-03-05 DIAGNOSIS — R53.1 DECREASED STRENGTH, ENDURANCE, AND MOBILITY: ICD-10-CM

## 2020-03-05 PROCEDURE — 97110 THERAPEUTIC EXERCISES: CPT | Mod: HCNC,PN

## 2020-03-05 PROCEDURE — 97140 MANUAL THERAPY 1/> REGIONS: CPT | Mod: HCNC,PN

## 2020-03-05 NOTE — PROGRESS NOTES
"                                                  Physical Therapy Daily Note     Date: 03/05/2020  Name: Yanni Kaiser  Clinic Number: [id number]  Diagnosis:   Encounter Diagnoses   Name Primary?    Pain in both feet     Decreased strength, endurance, and mobility     Ankle weakness     Tightness of left gastrocnemius muscle      Physician: Valentino Orourke DPM    Precautions: standard     Evaluation Date: 2/26/20  Start Time: 1545  Stop Time: 1635  Visit # authorized: 2/20  Authorization period: 9/5/20  Plan of care expiration: 5/26/20  MD referral: Valentino Orourke DPM     Hx of present illness: Pain began a few ago years ago B feet insidious onset. Pt came to PT 1-2x, did ice and pills and eventually had surgery (fasciotomy on the L 11/18/20). Pt has not been to PT since the surgery as she was in the hospital with the flu.    Subjective     Pt reports "just a little bit of soreness in my L foot." Reports compliance with HEP.    Objective       Patient received individual therapy to increase strength, endurance, ROM, flexibility and posture with activities as follows:     Yanni received therapeutic exercises to develop strength, endurance, ROM, flexibility and posture for 47 minutes including:     Nustep x6 min  Calf raises 2x10  Calf stretch x1 min knees bent and straight  Steamboats on foam 2x10 L  Step ups on foam LLE x20  Tandem stance 2x30" ea on foam  Step ups  4" 2x10 L  Step downs x20 L 4"  Sit to stands 18" box 2x10 with foam  L ankle alphabet x1 uppercase/lowercase  Great toe stretch 2x30" B  Towel scrunches x2 min B (same time)  3 way ankle RTB x30 L (no PF)        Yanni received the following manual therapy techniques for 8 minutes.     IASTM L plantar fascia      Written Home Exercises Provided: no new exercises provided this session  Pt demo good understanding of the education provided. Yanni demonstrated good return demonstration of activities.     Education provided: " ARCADIO Liao verbalized good understanding of education provided.   No spiritual or educational barriers to learning identified.    Assessment     Patient tolerated tx well. Added various exercises to improve strength/ROM. Patient able to tolerate much greater pressure to L foot and withstand manual therapy today-assess response next visit. Patient will continue to benefit from skilled PT in order to decrease pain, increase strength/ROM and improve functional mobility.    GOALS:   Short Term Goals:  4 weeks  1.Patient will be proficient and compliant with HEP.  2. Decrease pain at worst to no greater than 8/10.  3. Patient will demonstrate an increase of >/= 1/2 MMT LLE in order to improve functional mobility.        Long Term Goals: 12 weeks  1. Patient will demonstrate an increase of >/= 1 MMT LLE in order to improve functional mobility.  2. Patient will demonstrate full L ankle AROM in order to improve functional mobility.  3. Patient will be </= 51% limited in mobility according to FOTO.          Plan   Continue with established Plan of Care towards PT goals.      Therapist: Hermila Steiner, PT, DPT

## 2020-03-07 ENCOUNTER — TELEPHONE (OUTPATIENT)
Dept: GASTROENTEROLOGY | Facility: CLINIC | Age: 50
End: 2020-03-07

## 2020-03-07 ENCOUNTER — PATIENT MESSAGE (OUTPATIENT)
Dept: GASTROENTEROLOGY | Facility: CLINIC | Age: 50
End: 2020-03-07

## 2020-03-07 DIAGNOSIS — A04.8 H. PYLORI INFECTION: Primary | ICD-10-CM

## 2020-03-07 LAB — H PYLORI AG STL QL IA: DETECTED

## 2020-03-07 RX ORDER — PANTOPRAZOLE SODIUM 40 MG/1
40 TABLET, DELAYED RELEASE ORAL 2 TIMES DAILY
Qty: 28 TABLET | Refills: 0 | Status: SHIPPED | OUTPATIENT
Start: 2020-03-07 | End: 2020-03-08

## 2020-03-07 RX ORDER — AMOXICILLIN 500 MG/1
1000 CAPSULE ORAL 2 TIMES DAILY
Qty: 56 CAPSULE | Refills: 0 | Status: SHIPPED | OUTPATIENT
Start: 2020-03-07 | End: 2020-03-21

## 2020-03-07 RX ORDER — LEVOFLOXACIN 500 MG/1
500 TABLET, FILM COATED ORAL DAILY
Qty: 14 TABLET | Refills: 0 | Status: SHIPPED | OUTPATIENT
Start: 2020-03-07 | End: 2020-05-13

## 2020-03-08 DIAGNOSIS — A04.8 H. PYLORI INFECTION: ICD-10-CM

## 2020-03-08 RX ORDER — PANTOPRAZOLE SODIUM 40 MG/1
TABLET, DELAYED RELEASE ORAL
Qty: 60 TABLET | Refills: 0 | Status: SHIPPED | OUTPATIENT
Start: 2020-03-08 | End: 2020-04-14 | Stop reason: SDUPTHER

## 2020-03-08 NOTE — TELEPHONE ENCOUNTER
pls let pt know that she still has h. Pylori infection .    Confirm that she took the entire course of antibiotic  I would like to try the following treatment, It is very important that she completes all of it     Levofloxacin 500mg dily  Amoxicillin 1 g twice daily  Pantoprazole 40mg BID  For 14 days    We will need to check stool H. Pylori to assure the infection has resolved.

## 2020-03-09 NOTE — LETTER
November 6, 2018        Flako Trejo MD  [address] [place] [address] Bl  [address]  [facility]  [place] [address]             [facility]  [address]  [facility] [address]  Phone: [phone]  Fax: [phone]   Patient: Yanni Kaiser   MR Number: [id number]   YOB: 1970   Date of Visit: 11/5/2018       Dear Dr. Trejo:    Thank you for referring Yanni Kaiser to me for evaluation. Attached you will find relevant portions of my assessment and plan of care.    If you have questions, please do not hesitate to call me. I look forward to following Yanni Kaiser along with you.    Sincerely,                  CC  No Recipients    Enclosure         
No restrictions

## 2020-03-11 ENCOUNTER — TELEPHONE (OUTPATIENT)
Dept: PHARMACY | Facility: CLINIC | Age: 50
End: 2020-03-11

## 2020-03-11 ENCOUNTER — OFFICE VISIT (OUTPATIENT)
Dept: BARIATRICS | Facility: CLINIC | Age: 50
End: 2020-03-11
Payer: MEDICARE

## 2020-03-11 VITALS
SYSTOLIC BLOOD PRESSURE: 140 MMHG | WEIGHT: 293 LBS | HEIGHT: 60 IN | DIASTOLIC BLOOD PRESSURE: 84 MMHG | BODY MASS INDEX: 57.52 KG/M2 | HEART RATE: 73 BPM

## 2020-03-11 DIAGNOSIS — Z79.4 TYPE 2 DIABETES MELLITUS WITH STAGE 3 CHRONIC KIDNEY DISEASE, WITH LONG-TERM CURRENT USE OF INSULIN: ICD-10-CM

## 2020-03-11 DIAGNOSIS — E11.22 TYPE 2 DIABETES MELLITUS WITH STAGE 3 CHRONIC KIDNEY DISEASE, WITH LONG-TERM CURRENT USE OF INSULIN: ICD-10-CM

## 2020-03-11 DIAGNOSIS — N18.30 TYPE 2 DIABETES MELLITUS WITH STAGE 3 CHRONIC KIDNEY DISEASE, WITH LONG-TERM CURRENT USE OF INSULIN: ICD-10-CM

## 2020-03-11 DIAGNOSIS — E66.01 CLASS 3 SEVERE OBESITY DUE TO EXCESS CALORIES WITH SERIOUS COMORBIDITY AND BODY MASS INDEX (BMI) OF 60.0 TO 69.9 IN ADULT: ICD-10-CM

## 2020-03-11 PROCEDURE — 3077F PR MOST RECENT SYSTOLIC BLOOD PRESSURE >= 140 MM HG: ICD-10-PCS | Mod: HCNC,CPTII,S$GLB, | Performed by: INTERNAL MEDICINE

## 2020-03-11 PROCEDURE — 99999 PR PBB SHADOW E&M-EST. PATIENT-LVL III: ICD-10-PCS | Mod: PBBFAC,HCNC,, | Performed by: INTERNAL MEDICINE

## 2020-03-11 PROCEDURE — 3079F PR MOST RECENT DIASTOLIC BLOOD PRESSURE 80-89 MM HG: ICD-10-PCS | Mod: HCNC,CPTII,S$GLB, | Performed by: INTERNAL MEDICINE

## 2020-03-11 PROCEDURE — 3008F BODY MASS INDEX DOCD: CPT | Mod: HCNC,CPTII,S$GLB, | Performed by: INTERNAL MEDICINE

## 2020-03-11 PROCEDURE — 3079F DIAST BP 80-89 MM HG: CPT | Mod: HCNC,CPTII,S$GLB, | Performed by: INTERNAL MEDICINE

## 2020-03-11 PROCEDURE — 99213 OFFICE O/P EST LOW 20 MIN: CPT | Mod: HCNC,S$GLB,, | Performed by: INTERNAL MEDICINE

## 2020-03-11 PROCEDURE — 99213 PR OFFICE/OUTPT VISIT, EST, LEVL III, 20-29 MIN: ICD-10-PCS | Mod: HCNC,S$GLB,, | Performed by: INTERNAL MEDICINE

## 2020-03-11 PROCEDURE — 3044F PR MOST RECENT HEMOGLOBIN A1C LEVEL <7.0%: ICD-10-PCS | Mod: HCNC,CPTII,S$GLB, | Performed by: INTERNAL MEDICINE

## 2020-03-11 PROCEDURE — 3044F HG A1C LEVEL LT 7.0%: CPT | Mod: HCNC,CPTII,S$GLB, | Performed by: INTERNAL MEDICINE

## 2020-03-11 PROCEDURE — 99999 PR PBB SHADOW E&M-EST. PATIENT-LVL III: CPT | Mod: PBBFAC,HCNC,, | Performed by: INTERNAL MEDICINE

## 2020-03-11 PROCEDURE — 3077F SYST BP >= 140 MM HG: CPT | Mod: HCNC,CPTII,S$GLB, | Performed by: INTERNAL MEDICINE

## 2020-03-11 PROCEDURE — 3008F PR BODY MASS INDEX (BMI) DOCUMENTED: ICD-10-PCS | Mod: HCNC,CPTII,S$GLB, | Performed by: INTERNAL MEDICINE

## 2020-03-11 NOTE — PROGRESS NOTES
Subjective:       Patient ID: Yanni Kaiser is a 49 y.o. female.    Chief Complaint: Follow-up    Pt here today for follow-up. Has lost 14 lbs, 42 lbs. Started LCHF diet and ozempic. She is out of a low ortho boot from a foot surgery. Now walking at the gym. States her appetite is variable. State she is not eating much starch. Has given up sweet tea. Denies SE with ozempic.     Follow-up   Associated symptoms include arthralgias and chest pain. Pertinent negatives include no chills or fever.     Review of Systems   Constitutional: Negative for chills and fever.   Respiratory: Positive for apnea and shortness of breath.         Does not use CPAP machine. States did not like it.    Cardiovascular: Positive for chest pain and leg swelling.        With asthma sx   Gastrointestinal: Positive for constipation and diarrhea.        + GERD   Endocrine: Positive for cold intolerance.   Genitourinary: Negative for difficulty urinating and dysuria.           Musculoskeletal: Positive for arthralgias and back pain.   Neurological: Positive for dizziness and light-headedness.   Psychiatric/Behavioral: Positive for dysphoric mood. The patient is not nervous/anxious.        Objective:     BP (!) 140/84   Pulse 73   Ht 5' (1.524 m)   Wt (!) 150.1 kg (330 lb 14.6 oz)   BMI 64.63 kg/m²     Physical Exam   Constitutional: She is oriented to person, place, and time. She appears well-developed. No distress.   Morbidly obese     HENT:   Head: Normocephalic and atraumatic.   Eyes: Pupils are equal, round, and reactive to light. EOM are normal. No scleral icterus.   Neck: Normal range of motion. Neck supple.   Cardiovascular: Normal rate.   Pulmonary/Chest: Effort normal.   Musculoskeletal: Normal range of motion. She exhibits no edema.   Neurological: She is alert and oriented to person, place, and time. No cranial nerve deficit.   Skin: Skin is warm and dry. No erythema.   Psychiatric: She has a normal mood and affect. Her  behavior is normal. Judgment normal.   Vitals reviewed.      Assessment:       1. Type 2 diabetes mellitus with stage 3 chronic kidney disease, with long-term current use of insulin    2. Class 3 severe obesity due to excess calories with serious comorbidity and body mass index (BMI) of 60.0 to 69.9 in adult        Plan:         Yanni was seen today for follow-up.    Diagnoses and all orders for this visit:    Type 2 diabetes mellitus with stage 3 chronic kidney disease, with long-term current use of insulin  -     semaglutide (OZEMPIC) 1 mg/dose (2 mg/1.5 mL) PnIj; Inject 1 mg subq weekly.    Class 3 severe obesity due to excess calories with serious comorbidity and body mass index (BMI) of 60.0 to 69.9 in adult     Continue with Ozempic 1mg once a week.      Decrease portions as soon as you start Ozempic. Some nausea in the first 2 weeks is not unusual.     If you get pain across the upper abdomen and around to your back, please call the office.     Add some type of resistance training 2-3 days a week. These can be body weight exercises, light weight or elastic bands. [employer] and [employer] are great sources for free work out plans and videos.     3 meals a day made up of the following:  Unlimited green vegetables, tomatoes, mushrooms, spaghetti squash, cauliflower, meat, poultry, seafood, eggs and hard cheeses.   Milk and plain yogurt  Dressings, seasonings, condiments, etc should have less than 2 g sugars.   Beans (1-1.5 cups) or nuts (1/4 cup) can have 1 x a day.   1-2 servings of citrus fruits, berries, pineapple or melon a day (1/2 cup)  Avoid fried foods    No sweets, grains, rice, pasta, potatoes, bread, corn, peas, oatmeal, grits, tortillas, crackers, chips    No soda, sweet tea, juices or lemonade. All drinks should be 5 calories or less.     Www.dietdoctor.com for recipes. Moderate carb intake    30 min recipes given

## 2020-03-11 NOTE — PATIENT INSTRUCTIONS
Continue with Ozempic 1mg once a week.      Decrease portions as soon as you start Ozempic. Some nausea in the first 2 weeks is not unusual.     If you get pain across the upper abdomen and around to your back, please call the office.     Add some type of resistance training 2-3 days a week. These can be body weight exercises, light weight or elastic bands. [employer] and [employer] are great sources for free work out plans and videos.     3 meals a day made up of the following:  Unlimited green vegetables, tomatoes, mushrooms, spaghetti squash, cauliflower, meat, poultry, seafood, eggs and hard cheeses.   Milk and plain yogurt  Dressings, seasonings, condiments, etc should have less than 2 g sugars.   Beans (1-1.5 cups) or nuts (1/4 cup) can have 1 x a day.   1-2 servings of citrus fruits, berries, pineapple or melon a day (1/2 cup)  Avoid fried foods    No sweets, grains, rice, pasta, potatoes, bread, corn, peas, oatmeal, grits, tortillas, crackers, chips    No soda, sweet tea, juices or lemonade. All drinks should be 5 calories or less.     Www.dietdoctor.com for recipes. Moderate carb intake    Dinner in Under 30 minutes and 400 calories.     Baked Chicken breast with Broccoli Cheese Casserole  2-3 chicken breast (approximately 6-8 oz each)    2 tsp each garlic and herb Mrs. Dash seasoning   2 tsp your favorite creole seasoning  2 tablespoons of balsamic vinegar  2 - 10 oz packages of frozen broccoli  1 egg   ½ cup skim milk  ½ tsp paprika   ½ tsp cayenne pepper   1 can fat free cream of mushroom soup.   1 .5 cups of shredded cheddar cheese    Pre-heat oven to 450 degrees. Toss 2-3 chicken breast (approximately 6-8 oz each) in 2 tsp each garlic and herb Mrs. Dash seasoning, 2 tsp your favorite creole seasoning, and 2 tablespoons of balsamic vinegar. This can also be done up to 24 hours ahead of time, and the chicken can be left in the refrigerator to marinate. Place on a baking sheet sprayed with non-stick cooking  spray and bake on 450 degrees for 10 min, then reduce heat to 350 degrees and cook an addition 10-15 minutes until chicken is cooked through.   While chicken is baking, microwave safe dish, thaw 2 - 10 oz packages of frozen broccoli. Drain any excess water, season with salt, pepper, all-purpose seasoning of your choice. In another bowl, whisk together 1 egg, ½ cup skim milk, ½ tsp paprika, ½ tsp cayenne pepper and 1 can fat free cream of mushroom soup. Combine broccoli, soup mixture, 1 cup of shredded cheddar cheese in baking dish sprayed with cooking spray. Top with remaining cheese. Bake in the oven with chicken for about 20 min or until set cheese is melted and mohan.     Makes 4 servings. 389 calories per serving.10 g fat, 13 g carbs, 54g protein     Sheet Pan [place] Shrimp  1 pound large shrimp, shelled, peeled and deveined.   2 tablespoon olive oil  The zest and the juice of 1 lime  ½ tsp chili powder  ½-1 tsp cayenne pepper  1 tsp cumin  ½ tsp dry oregano  1 red bell pepper  1 green bell pepper  1 red onion  2 cups mushrooms, quartered  1 avocado  Whole carito lettuce leaves  Preheat oven to 375 degrees.  In a bowl combine shrimp, lime juice, lime zest, cumin, cayenne pepper, chili powder, and a pinch of salt. Set aside.   Slice peppers and onions into thin strips. Toss in 1 tablespoon of olive oil. Sprinkle with salt and pepper and arrange in a single layer over 1/3 of a large sheet pan  Toss mushrooms with remaining olive oil, oregano, salt and pepper. Arrange in single layer on sheet pan. Place sheet pan in oven for about 15-20 minutes until vegetables are softened, cooked about ¾ of the way through. Remove the sheet pan from oven.  Change setting on oven to low broil.  If needed, rearrange vegetables to make room for shrimp. Arrange the shrimp in a single layer on the sheet pan and return pan to oven. After 5 minutes, flip shrimp. Cook 3-5 additional minutes, or until  Shrimp are opaque.   Serve  shrimp and cooked vegetables on lettuce leaves with sliced avocado.   Makes 4 servings. Calories per servin; 23g fat, 19 g carbohydrates, 27g protein.    Zoodles Primavera with Seasoned Ricotta  8 cups zucchini noodles. These can be purchased already prepared, or you can make them on your own with whole zucchini and a vegetable spiralizer.   1.5 cups cherry tomatoes, quartered  2 cups sliced mushrooms  1 cup chopped fresh broccoli  1 cup frozen peas and carrots  2 cloves garlic, finely chopped  16 oz part skim ricotta cheese  2 oz Neufchatel cream cream cheese, cut into small cubes  Juice and zest of 1 lemon  1 pinch red pepper flakes    2 tsp Italian seasoning  4-5 leaves fresh basil, thinly sliced  1 tablespoon of olive oil  Parmesan cheese for topping (optional)     In a bowl, combine ricotta cheese, 1 tsp Italian seasoning, ½ teaspoon lemon zest. Season with salt and pepper to taste. Mix well. Fold in half of fresh basil. Set aside.   Heat a large skillet to medium high. Add olive oil. Sautee mushrooms until starting to become tender. Season them with salt and pepper while cooking.  Add broccoli and peas and carrots. Reduce heat to medium. Cover pan and cook for 4-5 minutes until broccoli is tender and peas and carrots are warmed through. Add Neufchatel cheese, Italian seasoning, red pepper flakes, 1 tsp lemon zest and lemon juice. Stir until a smooth sauce is formed. Add zucchini noodles (zoodles) to skillet and toss in sauce, then add cherry tomatoes.  Separate zoodle and vegetables into 4 equal portions. Serve each with a ¼ cup serving of seasoned ricotta cheese. Sprinkle with grated parmesan cheese or fresh basil,  if desired.   Makes 4 servings. Calories per servin calories, 32 g carbs, 33 g protein, 18 g fat

## 2020-03-16 ENCOUNTER — CLINICAL SUPPORT (OUTPATIENT)
Dept: REHABILITATION | Facility: HOSPITAL | Age: 50
End: 2020-03-16
Attending: PODIATRIST
Payer: MEDICARE

## 2020-03-16 DIAGNOSIS — Z74.09 DECREASED STRENGTH, ENDURANCE, AND MOBILITY: ICD-10-CM

## 2020-03-16 DIAGNOSIS — R68.89 DECREASED STRENGTH, ENDURANCE, AND MOBILITY: ICD-10-CM

## 2020-03-16 DIAGNOSIS — M62.89 TIGHTNESS OF LEFT GASTROCNEMIUS MUSCLE: ICD-10-CM

## 2020-03-16 DIAGNOSIS — M79.671 PAIN IN BOTH FEET: ICD-10-CM

## 2020-03-16 DIAGNOSIS — R53.1 DECREASED STRENGTH, ENDURANCE, AND MOBILITY: ICD-10-CM

## 2020-03-16 DIAGNOSIS — M79.672 PAIN IN BOTH FEET: ICD-10-CM

## 2020-03-16 DIAGNOSIS — R29.898 ANKLE WEAKNESS: ICD-10-CM

## 2020-03-16 PROCEDURE — 97110 THERAPEUTIC EXERCISES: CPT | Mod: HCNC,PN,CQ

## 2020-03-16 PROCEDURE — 97140 MANUAL THERAPY 1/> REGIONS: CPT | Mod: HCNC,PN,CQ

## 2020-03-16 NOTE — PROGRESS NOTES
"                                                  Physical Therapy Daily Note     Date: 03/16/2020  Name: Yanni Kaiser  Clinic Number: [id number]  Diagnosis:   Encounter Diagnoses   Name Primary?    Pain in both feet     Decreased strength, endurance, and mobility     Ankle weakness     Tightness of left gastrocnemius muscle      Physician: Valentino Orourke DPM    Precautions: standard     Evaluation Date: 2/26/20  Start Time: 1500  Stop Time: 1555  Visit # authorized: 3/20  Authorization period: 9/5/20  Plan of care expiration: 5/26/20  MD referral: Valentino Orourke DPM     Hx of present illness: Pain began a few ago years ago B feet insidious onset. Pt came to PT 1-2x, did ice and pills and eventually had surgery (fasciotomy on the L 11/18/20). Pt has not been to PT since the surgery as she was in the hospital with the flu.    Subjective     "I'm hurting today." she reports 7-8/10 pain in B feet.    Objective       Patient received individual therapy to increase strength, endurance, ROM, flexibility and posture with activities as follows:     Yanni received therapeutic exercises to develop strength, endurance, ROM, flexibility and posture for 45 minutes including:     Nustep x7 min  Calf raises 2x10  Calf stretch x1 min knees bent and straight  Steamboats on foam 2x10 L  Step ups on foam LLE x20  Tandem stance 2x30" ea on foam  Step ups  4" 2x10 L  Step downs x20 L 4"  Sit to stands 18" box 2x10 with foam  L ankle alphabet x1 uppercase/lowercase  Great toe stretch 2x30" B  Towel scrunches x2 min B (same time)  3 way ankle RTB x30 L (no PF)        Yanni received the following manual therapy techniques for 10 minutes.     IASTM L plantar fascia      Written Home Exercises Provided: no new exercises provided this session  Pt demo good understanding of the education provided. Yanni demonstrated good return demonstration of activities.     Education provided: ARCADIO Liao verbalized good " understanding of education provided.   No spiritual or educational barriers to learning identified.    Assessment     Pt tolerated today's session well despite initial reports of increased pain in B feet today. No adverse reaction to note throughout exercises. Most difficulty noted when incorporating balance and with proprioceptive therex on L side. Notable myofascial inconsistencies to L plantar fascia area and TTP.   Patient will continue to benefit from skilled PT in order to decrease pain, increase strength/ROM and improve functional mobility.    GOALS:   Short Term Goals:  4 weeks  1.Patient will be proficient and compliant with HEP.  2. Decrease pain at worst to no greater than 8/10.  3. Patient will demonstrate an increase of >/= 1/2 MMT LLE in order to improve functional mobility.        Long Term Goals: 12 weeks  1. Patient will demonstrate an increase of >/= 1 MMT LLE in order to improve functional mobility.  2. Patient will demonstrate full L ankle AROM in order to improve functional mobility.  3. Patient will be </= 51% limited in mobility according to FOTO.          Plan   Continue with established Plan of Care towards PT goals.      Therapist: Lesli Worthy, PTA

## 2020-03-18 ENCOUNTER — CLINICAL SUPPORT (OUTPATIENT)
Dept: REHABILITATION | Facility: HOSPITAL | Age: 50
End: 2020-03-18
Attending: PODIATRIST
Payer: MEDICARE

## 2020-03-18 DIAGNOSIS — Z74.09 DECREASED STRENGTH, ENDURANCE, AND MOBILITY: ICD-10-CM

## 2020-03-18 DIAGNOSIS — R68.89 DECREASED STRENGTH, ENDURANCE, AND MOBILITY: ICD-10-CM

## 2020-03-18 DIAGNOSIS — M79.671 PAIN IN BOTH FEET: ICD-10-CM

## 2020-03-18 DIAGNOSIS — M62.89 TIGHTNESS OF LEFT GASTROCNEMIUS MUSCLE: ICD-10-CM

## 2020-03-18 DIAGNOSIS — R29.898 ANKLE WEAKNESS: ICD-10-CM

## 2020-03-18 DIAGNOSIS — R53.1 DECREASED STRENGTH, ENDURANCE, AND MOBILITY: ICD-10-CM

## 2020-03-18 DIAGNOSIS — M79.672 PAIN IN BOTH FEET: ICD-10-CM

## 2020-03-18 PROCEDURE — 97110 THERAPEUTIC EXERCISES: CPT | Mod: HCNC,PN

## 2020-03-18 PROCEDURE — 97140 MANUAL THERAPY 1/> REGIONS: CPT | Mod: HCNC,PN

## 2020-03-18 NOTE — PROGRESS NOTES
"                                                  Physical Therapy Daily Note     Date: 03/18/2020  Name: Yanni Kaiser  Clinic Number: [id number]  Diagnosis:   Encounter Diagnoses   Name Primary?    Pain in both feet     Decreased strength, endurance, and mobility     Ankle weakness     Tightness of left gastrocnemius muscle      Physician: Valentino Orourke DPM    Precautions: standard     Evaluation Date: 2/26/20  Start Time: 1500  Stop Time: 1555  Visit # authorized: 4/20  Authorization period: 9/5/20  Plan of care expiration: 5/26/20  MD referral: Valentino Orourke DPM     Hx of present illness: Pain began a few ago years ago B feet insidious onset. Pt came to PT 1-2x, did ice and pills and eventually had surgery (fasciotomy on the L 11/18/20). Pt has not been to PT since the surgery as she was in the hospital with the flu.    Subjective     "My feet aren't good today. I've been doing a lot of running around because my son is sick and [facility]'ve [facility] and [facility]." Pain is 7.5/10 pre-tx.    Objective       Patient received individual therapy to increase strength, endurance, ROM, flexibility and posture with activities as follows:     Yanni received therapeutic exercises to develop strength, endurance, ROM, flexibility and posture for 45 minutes including:     Nustep x7 min  Calf raises 2x10  Calf stretch x1 min knees bent and straight  Steamboats on foam 2x10 L  Step ups on foam LLE x20  Tandem stance 2x30" ea on foam  Step ups  4" 2x10 L  Step downs x20 L 4"  Sit to stands 18" box 2x10 with foam under buttocks  L ankle alphabet x1 uppercase/lowercase  Great toe stretch 2x30" B  Towel scrunches x2 min B (same time)  3 way ankle RTB x30 L (no PF)          Yanni received the following manual therapy techniques for 10 minutes.     IASTM L plantar fascia/gastroc      Written Home Exercises Provided: pt provided with handout of all exercises listed above  Pt demo good " understanding of the education provided. Yanni demonstrated good return demonstration of activities.     Education provided: Mariama samantha Liao verbalized good understanding of education provided.   No spiritual or educational barriers to learning identified.    Assessment     Pt tolerated today's session well but with reports of muscle fatigue and burning in LLE during standing exercises. Reviewed HEP and patient provided with extensive handout of all above exercises. Patient with significant TTP L plantarfascia and muscle tightness L gastroc medial>lateral. Educated patient in self massage plantarfascia with use of tennis ball or frozen water bottle.   Patient will continue to benefit from skilled PT in order to decrease pain, increase strength/ROM and improve functional mobility.     Spoke with patient due to therapy following updates regarding COVID-19 closely and taking every precaution to ensure the safety of our patients, staff and community.  In an abundance of caution and in an effort to help reduce risk and limit community spread, we have decided to temporarily postpone appointments for patients who may be at increased risk to attend in-person therapy or where therapy is not critically needed at this time. Plan of care and home exercise program were reviewed and patient has what they need to continue therapy at home. All patient questions were answered. Also stated to patient that we are exploring virtual methods of providing care and will be in touch over the next few weeks. Patient verbalized understanding to all.      GOALS:   Short Term Goals:  4 weeks  1.Patient will be proficient and compliant with HEP.  2. Decrease pain at worst to no greater than 8/10.  3. Patient will demonstrate an increase of >/= 1/2 MMT LLE in order to improve functional mobility.        Long Term Goals: 12 weeks  1. Patient will demonstrate an increase of >/= 1 MMT LLE in order to improve functional mobility.  2.  Patient will demonstrate full L ankle AROM in order to improve functional mobility.  3. Patient will be </= 51% limited in mobility according to FOTO.          Plan   Continue with established Plan of Care towards PT goals.      Therapist: Hermila Steiner, PT

## 2020-03-20 ENCOUNTER — PATIENT MESSAGE (OUTPATIENT)
Dept: ADMINISTRATIVE | Facility: OTHER | Age: 50
End: 2020-03-20

## 2020-03-22 ENCOUNTER — PATIENT MESSAGE (OUTPATIENT)
Dept: PODIATRY | Facility: CLINIC | Age: 50
End: 2020-03-22

## 2020-03-22 DIAGNOSIS — F33.3 SEVERE EPISODE OF RECURRENT MAJOR DEPRESSIVE DISORDER, WITH PSYCHOTIC FEATURES: ICD-10-CM

## 2020-03-23 ENCOUNTER — TELEPHONE (OUTPATIENT)
Dept: PODIATRY | Facility: CLINIC | Age: 50
End: 2020-03-23

## 2020-03-23 ENCOUNTER — TELEPHONE (OUTPATIENT)
Dept: SLEEP MEDICINE | Facility: CLINIC | Age: 50
End: 2020-03-23

## 2020-03-23 DIAGNOSIS — R11.2 NAUSEA AND VOMITING, INTRACTABILITY OF VOMITING NOT SPECIFIED, UNSPECIFIED VOMITING TYPE: ICD-10-CM

## 2020-03-23 DIAGNOSIS — E11.9 TYPE 2 DIABETES MELLITUS: ICD-10-CM

## 2020-03-23 DIAGNOSIS — J32.1 CHRONIC FRONTAL SINUSITIS: ICD-10-CM

## 2020-03-23 RX ORDER — QUETIAPINE FUMARATE 25 MG/1
25 TABLET, FILM COATED ORAL DAILY
Qty: 90 TABLET | Refills: 0 | Status: SHIPPED | OUTPATIENT
Start: 2020-03-23 | End: 2020-06-23 | Stop reason: SDUPTHER

## 2020-03-23 RX ORDER — DICLOFENAC SODIUM 10 MG/G
2 GEL TOPICAL DAILY
Qty: 100 G | Refills: 3 | Status: SHIPPED | OUTPATIENT
Start: 2020-03-23 | End: 2020-06-12

## 2020-03-23 RX ORDER — ONDANSETRON HYDROCHLORIDE 8 MG/1
8 TABLET, FILM COATED ORAL EVERY 8 HOURS PRN
Qty: 90 TABLET | Refills: 5 | Status: SHIPPED | OUTPATIENT
Start: 2020-03-23 | End: 2020-10-13 | Stop reason: SDUPTHER

## 2020-03-23 RX ORDER — LEVOCETIRIZINE DIHYDROCHLORIDE 5 MG/1
5 TABLET, FILM COATED ORAL NIGHTLY
Qty: 90 TABLET | Refills: 0 | Status: SHIPPED | OUTPATIENT
Start: 2020-03-23 | End: 2020-05-13

## 2020-03-24 ENCOUNTER — OFFICE VISIT (OUTPATIENT)
Dept: PODIATRY | Facility: CLINIC | Age: 50
End: 2020-03-24
Payer: MEDICARE

## 2020-03-24 ENCOUNTER — HOSPITAL ENCOUNTER (OUTPATIENT)
Dept: SLEEP MEDICINE | Facility: HOSPITAL | Age: 50
Discharge: HOME | End: 2020-03-24
Attending: PSYCHIATRY & NEUROLOGY
Payer: MEDICARE

## 2020-03-24 ENCOUNTER — TELEPHONE (OUTPATIENT)
Dept: PODIATRY | Facility: CLINIC | Age: 50
End: 2020-03-24

## 2020-03-24 ENCOUNTER — OFFICE VISIT (OUTPATIENT)
Dept: SLEEP MEDICINE | Facility: CLINIC | Age: 50
End: 2020-03-24
Payer: MEDICARE

## 2020-03-24 VITALS
DIASTOLIC BLOOD PRESSURE: 94 MMHG | BODY MASS INDEX: 63.55 KG/M2 | SYSTOLIC BLOOD PRESSURE: 140 MMHG | HEIGHT: 60 IN | HEART RATE: 73 BPM

## 2020-03-24 VITALS
SYSTOLIC BLOOD PRESSURE: 158 MMHG | DIASTOLIC BLOOD PRESSURE: 101 MMHG | WEIGHT: 293 LBS | HEIGHT: 60 IN | HEART RATE: 69 BPM | BODY MASS INDEX: 57.52 KG/M2

## 2020-03-24 DIAGNOSIS — M79.671 FOOT PAIN, BILATERAL: ICD-10-CM

## 2020-03-24 DIAGNOSIS — G57.52 TARSAL TUNNEL SYNDROME OF LEFT SIDE: ICD-10-CM

## 2020-03-24 DIAGNOSIS — G47.33 OSA (OBSTRUCTIVE SLEEP APNEA): ICD-10-CM

## 2020-03-24 DIAGNOSIS — M79.672 FOOT PAIN, BILATERAL: ICD-10-CM

## 2020-03-24 DIAGNOSIS — G47.30 SLEEP APNEA, UNSPECIFIED TYPE: ICD-10-CM

## 2020-03-24 DIAGNOSIS — G47.30 SLEEP APNEA, UNSPECIFIED TYPE: Primary | ICD-10-CM

## 2020-03-24 DIAGNOSIS — M72.2 PLANTAR FASCIAL FIBROMATOSIS OF LEFT FOOT: Primary | ICD-10-CM

## 2020-03-24 PROCEDURE — 99999 PR PBB SHADOW E&M-EST. PATIENT-LVL III: ICD-10-PCS | Mod: PBBFAC,HCNC,, | Performed by: PSYCHIATRY & NEUROLOGY

## 2020-03-24 PROCEDURE — 3077F PR MOST RECENT SYSTOLIC BLOOD PRESSURE >= 140 MM HG: ICD-10-PCS | Mod: HCNC,CPTII,S$GLB, | Performed by: PODIATRIST

## 2020-03-24 PROCEDURE — 64450 NJX AA&/STRD OTHER PN/BRANCH: CPT | Mod: HCNC,LT,S$GLB, | Performed by: PODIATRIST

## 2020-03-24 PROCEDURE — 99999 PR PBB SHADOW E&M-EST. PATIENT-LVL III: CPT | Mod: PBBFAC,HCNC,, | Performed by: PSYCHIATRY & NEUROLOGY

## 2020-03-24 PROCEDURE — 99214 OFFICE O/P EST MOD 30 MIN: CPT | Mod: HCNC,S$GLB,, | Performed by: PSYCHIATRY & NEUROLOGY

## 2020-03-24 PROCEDURE — 99214 PR OFFICE/OUTPT VISIT, EST, LEVL IV, 30-39 MIN: ICD-10-PCS | Mod: HCNC,S$GLB,, | Performed by: PSYCHIATRY & NEUROLOGY

## 2020-03-24 PROCEDURE — 3080F DIAST BP >= 90 MM HG: CPT | Mod: HCNC,CPTII,S$GLB, | Performed by: PODIATRIST

## 2020-03-24 PROCEDURE — 99499 RISK ADDL DX/OHS AUDIT: ICD-10-PCS | Mod: S$GLB,,, | Performed by: PSYCHIATRY & NEUROLOGY

## 2020-03-24 PROCEDURE — 99214 OFFICE O/P EST MOD 30 MIN: CPT | Mod: 25,HCNC,S$GLB, | Performed by: PODIATRIST

## 2020-03-24 PROCEDURE — 3077F PR MOST RECENT SYSTOLIC BLOOD PRESSURE >= 140 MM HG: ICD-10-PCS | Mod: HCNC,CPTII,S$GLB, | Performed by: PSYCHIATRY & NEUROLOGY

## 2020-03-24 PROCEDURE — 3080F DIAST BP >= 90 MM HG: CPT | Mod: HCNC,CPTII,S$GLB, | Performed by: PSYCHIATRY & NEUROLOGY

## 2020-03-24 PROCEDURE — 99499 UNLISTED E&M SERVICE: CPT | Mod: S$GLB,,, | Performed by: PSYCHIATRY & NEUROLOGY

## 2020-03-24 PROCEDURE — 3008F BODY MASS INDEX DOCD: CPT | Mod: HCNC,CPTII,S$GLB, | Performed by: PODIATRIST

## 2020-03-24 PROCEDURE — 3077F SYST BP >= 140 MM HG: CPT | Mod: HCNC,CPTII,S$GLB, | Performed by: PODIATRIST

## 2020-03-24 PROCEDURE — 99214 PR OFFICE/OUTPT VISIT, EST, LEVL IV, 30-39 MIN: ICD-10-PCS | Mod: 25,HCNC,S$GLB, | Performed by: PODIATRIST

## 2020-03-24 PROCEDURE — 3077F SYST BP >= 140 MM HG: CPT | Mod: HCNC,CPTII,S$GLB, | Performed by: PSYCHIATRY & NEUROLOGY

## 2020-03-24 PROCEDURE — 3008F PR BODY MASS INDEX (BMI) DOCUMENTED: ICD-10-PCS | Mod: HCNC,CPTII,S$GLB, | Performed by: PSYCHIATRY & NEUROLOGY

## 2020-03-24 PROCEDURE — 3080F PR MOST RECENT DIASTOLIC BLOOD PRESSURE >= 90 MM HG: ICD-10-PCS | Mod: HCNC,CPTII,S$GLB, | Performed by: PSYCHIATRY & NEUROLOGY

## 2020-03-24 PROCEDURE — 95800 SLP STDY UNATTENDED: CPT | Mod: HCNC

## 2020-03-24 PROCEDURE — 64450 PR NERVE BLOCK INJ, ANES/STEROID, OTHER PERIPHERAL: ICD-10-PCS | Mod: HCNC,LT,S$GLB, | Performed by: PODIATRIST

## 2020-03-24 PROCEDURE — 3008F PR BODY MASS INDEX (BMI) DOCUMENTED: ICD-10-PCS | Mod: HCNC,CPTII,S$GLB, | Performed by: PODIATRIST

## 2020-03-24 PROCEDURE — 99999 PR PBB SHADOW E&M-EST. PATIENT-LVL III: ICD-10-PCS | Mod: PBBFAC,HCNC,, | Performed by: PODIATRIST

## 2020-03-24 PROCEDURE — 3008F BODY MASS INDEX DOCD: CPT | Mod: HCNC,CPTII,S$GLB, | Performed by: PSYCHIATRY & NEUROLOGY

## 2020-03-24 PROCEDURE — 99999 PR PBB SHADOW E&M-EST. PATIENT-LVL III: CPT | Mod: PBBFAC,HCNC,, | Performed by: PODIATRIST

## 2020-03-24 PROCEDURE — 3080F PR MOST RECENT DIASTOLIC BLOOD PRESSURE >= 90 MM HG: ICD-10-PCS | Mod: HCNC,CPTII,S$GLB, | Performed by: PODIATRIST

## 2020-03-24 RX ORDER — OXYCODONE AND ACETAMINOPHEN 5; 325 MG/1; MG/1
1 TABLET ORAL EVERY 4 HOURS PRN
Qty: 40 TABLET | Refills: 0 | Status: SHIPPED | OUTPATIENT
Start: 2020-03-24 | End: 2020-04-10 | Stop reason: SDUPTHER

## 2020-03-24 NOTE — PATIENT INSTRUCTIONS
SLEEP LAB ([facility] or [facility]) will contact you to schedulethe sleep study. Their number is [phone] (ext 2). Please call them if you do not hear from them in 2 weeks from now.  The [facility] Lab is located on 7th floor of the [facility]; [place] lab is located in [facility].    SLEEP CLINIC (my assistant) will call you when the sleep study results are ready - if you have not heard from us by 2 weeks from the date of the study, please call [phone] (ext 1) or you can use [facility] to contact me.    You are advised to abstain from driving should you feel sleepy or drowsy.

## 2020-03-24 NOTE — TELEPHONE ENCOUNTER
Please review message and advise    ----- Message from Smita Macias sent at 3/24/2020  4:31 PM CDT -----  Contact: self  Pt came in this morning and had her shot she is stating her foot has been numb since this morning. Asking for a call back.      Contact info [phone]

## 2020-03-24 NOTE — PROGRESS NOTES
The patient ID was verified.  She was instructed on how to turn the Home Sleep Testing device on and off, how to apply the sensors. She was encouraged to sleep on supine position and must have 6 hours of sleep. The patient was instructed not to get the device wet and return it back to us. All questions were answered prior to patient leaving. She was provided the after visit summary.

## 2020-03-24 NOTE — LETTER
March 24, 2020      Jagruti Sweeney MD  [address]  [facility] [address]           [id number]  [address]  [facility] [address]  Phone: [phone]          Patient: Yanni Kaiser   MR Number: [id number]   YOB: 1970   Date of Visit: 3/24/2020       Dear Dr. Jagruti Sweeney:    Thank you for referring Yanni Kaiser to me for evaluation. Attached you will find relevant portions of my assessment and plan of care.    If you have questions, please do not hesitate to call me. I look forward to following Yanni Kaiser along with you.    Sincerely,    Cecilia Johnson MD    Enclosure  CC:  No Recipients    If you would like to receive this communication electronically, please contact [email or web address] or [phone] to request more information on [employer] access.    For providers and/or their staff who would like to refer a patient to [facility], please contact us through our one-stop-shop provider referral line, [facility] [place], at [phone].    If you feel you have received this communication in error or would no longer like to receive these types of communications, please e-mail [email or web address]

## 2020-03-24 NOTE — PROGRESS NOTES
"Subjective:      Patient ID: Yanni Kaiser is a 49 y.o. female.    Chief Complaint: Foot Problem (bilateral) and Foot Pain    Pt presents c/o left heel pain. Pt states she head a "pop" noise that was followed by extreme pain a few days ago.     3/24/2020: Patient underwent plantar fasciotomy in 11/20/20. Wound healed and patient began physical therapy. She reports about a week ago she began having increased pain to the bottom of her left foot. Denies any trauma. PT placed her on a hiatus until the pain improves. She reports pain with weight bearing and also pain to both feet at night. Does have a history of back pain with sciatica to the L side.     Review of Systems   Constitution: Negative for chills, fever and malaise/fatigue.   HENT: Negative for hearing loss.    Cardiovascular: Negative for claudication.   Respiratory: Negative for shortness of breath.    Skin: Negative for flushing and rash.   Musculoskeletal: Negative for joint pain and myalgias.        Pain to both feet   Neurological: Negative for loss of balance, numbness, paresthesias and sensory change.   Psychiatric/Behavioral: Negative for altered mental status.           Objective:      Physical Exam   Constitutional: She is oriented to person, place, and time. She appears well-developed and well-nourished.   Cardiovascular:   Pulses:       Dorsalis pedis pulses are 2+ on the right side, and 2+ on the left side.        Posterior tibial pulses are 2+ on the right side, and 2+ on the left side.   Musculoskeletal:        Right knee: She exhibits no swelling and no ecchymosis.        Left knee: She exhibits no swelling and no ecchymosis.        Right ankle: She exhibits normal range of motion, no swelling, no ecchymosis and normal pulse. No lateral malleolus, no medial malleolus and no head of 5th metatarsal tenderness found. Achilles tendon exhibits no pain, no defect and normal Copeland's test results.        Left ankle: She exhibits normal " range of motion, no swelling, no ecchymosis and normal pulse. No lateral malleolus, no medial malleolus and no head of 5th metatarsal tenderness found. Achilles tendon exhibits no pain and normal Copeland's test results.        Right lower leg: She exhibits no tenderness, no bony tenderness, no swelling, no edema and no deformity.        Left lower leg: She exhibits no tenderness, no swelling and no edema.        Right foot: There is normal range of motion and no deformity.        Left foot: There is normal range of motion and no deformity.   Pain along the plantar aspect of the left foot. Maximal tenderness is along the small plantar fibroma at the distal medial band of the plantar fascia. Tenderness tracking proximally along the medial band and into the tarsal tunnel.    Right foot has tenderness along the plantar aspect of the foot and into the tarsal tunnel.   Feet:   Right Foot:   Protective Sensation: 5 sites tested. 5 sites sensed.   Left Foot:   Protective Sensation: 5 sites tested. 5 sites sensed.   Neurological: She is alert and oriented to person, place, and time.   Gross sensation intact to b/L lower extremities  (+) Tinel's sign: L PT, DP and R SP, DP, PT  Light touch intact.  MMT 5/5 DF, PF, Inv, Ev  Normal muscle tone.  No signs of atrophy.  No tremor or spasticity.     Skin: Skin is warm. Capillary refill takes more than 3 seconds. No abrasion, no bruising, no burn and no ecchymosis noted.   No open lesions noted to b/L lower extremities.     Examination of the skin reveals no evidence of significant rashes, open lesions, suspicious appearing nevi or other concerning lesions.      Psychiatric: She has a normal mood and affect. Her speech is normal and behavior is normal. She is attentive.             Assessment:       Encounter Diagnoses   Name Primary?    Plantar fascial fibromatosis of left foot Yes    Foot pain, bilateral     Tarsal tunnel syndrome of left side          Plan:       Yanni was  seen today for foot problem and foot pain.    Diagnoses and all orders for this visit:    Plantar fascial fibromatosis of left foot    Foot pain, bilateral    Tarsal tunnel syndrome of left side    Other orders  -     oxyCODONE-acetaminophen (PERCOCET) 5-325 mg per tablet; Take 1 tablet by mouth every 4 (four) hours as needed for Pain.      I counseled the patient on her conditions, their implications and medical management.    Patient to apply diclofenac gel and massage the plantar aspect of the left foot with frozen water bottle as instructed.    Continue stretching the plantar fascia b/l.     The area overlying the left tarsal tunnel/posterior tibial nerve nerve(s) was sterilely prepped, verbal consent was obtained, 2 cc's of 0.5% Marcaine plain was infiltrated around the affected nerve(s) for a diagnostic nerve block.  This was well tolerated with no complications.    Patient to follow up as specified.    Steve Ortiz, SANCEHZ, PGY-2    .

## 2020-03-26 PROBLEM — R53.1 DECREASED STRENGTH, ENDURANCE, AND MOBILITY: Status: RESOLVED | Noted: 2020-02-26 | Resolved: 2020-03-26

## 2020-03-26 PROBLEM — M62.89 TIGHTNESS OF LEFT GASTROCNEMIUS MUSCLE: Status: RESOLVED | Noted: 2020-02-26 | Resolved: 2020-03-26

## 2020-03-26 PROBLEM — R26.89 ANTALGIC GAIT: Status: RESOLVED | Noted: 2018-10-04 | Resolved: 2020-03-26

## 2020-03-26 PROBLEM — Z74.09 DECREASED STRENGTH, ENDURANCE, AND MOBILITY: Status: RESOLVED | Noted: 2020-02-26 | Resolved: 2020-03-26

## 2020-03-26 PROBLEM — M79.673 FOOT PAIN: Status: RESOLVED | Noted: 2020-02-26 | Resolved: 2020-03-26

## 2020-03-26 PROBLEM — R29.898 ANKLE WEAKNESS: Status: RESOLVED | Noted: 2020-02-26 | Resolved: 2020-03-26

## 2020-03-26 PROBLEM — R68.89 DECREASED STRENGTH, ENDURANCE, AND MOBILITY: Status: RESOLVED | Noted: 2020-02-26 | Resolved: 2020-03-26

## 2020-03-27 ENCOUNTER — PATIENT OUTREACH (OUTPATIENT)
Dept: ADMINISTRATIVE | Facility: HOSPITAL | Age: 50
End: 2020-03-27

## 2020-03-27 DIAGNOSIS — E11.9 TYPE 2 DIABETES MELLITUS WITHOUT COMPLICATION, UNSPECIFIED WHETHER LONG TERM INSULIN USE: ICD-10-CM

## 2020-03-27 RX ORDER — METFORMIN HYDROCHLORIDE 500 MG/1
TABLET, EXTENDED RELEASE ORAL
Qty: 360 TABLET | Refills: 0 | Status: SHIPPED | OUTPATIENT
Start: 2020-03-27 | End: 2020-07-12

## 2020-03-27 NOTE — HOSPITAL COURSE
Ms Kaiser presented with acute respiratory failure with hypoxia secondary to asthma exacerbation. Likely exacerbated by viral upper respiratory tract infection. Also an active cigarette smoker, is morbidly obese and has SHARATH not treated with CPAP. Respiratory distress improved with supplemental O2, solumedrol, continuous nebs, epinephrine 0.3 mg IM and Mag 2 mg IV but still with plenty of residual wheezing. Admitted to ICU for close observation. Continued on scheduled duo-nebs q 4 hrs, solumedrol 40 mg TID and supplemental O2 via low flow NC, and left on bed rest status. Weaned off NC to room air. Still wheezing but overall felt better and able to ambulate short distances. Is very motivated to quit smoking and will enroll in smoking cessation program (she already contacted them to make an appointment). Steroids de-escalated to prednisone 20 mg daily (40 mg would make BG too high), ICS/LABA and changed duo-nebs to PRN. Stepped down to floor on 9/5.     9/6- pt did well with weaning of steroids and ok for dc home; pt given prescription for nebulizer solution and increased insulin regimen to basal-bolus insulin with oral agents; needs close follow up PCP and pulmonology; Counseled on smoking cessation.    Walking

## 2020-03-29 PROCEDURE — 95800 SLP STDY UNATTENDED: CPT | Mod: 26,HCNC,, | Performed by: PSYCHIATRY & NEUROLOGY

## 2020-03-29 PROCEDURE — 95800 PR SLEEP STUDY, UNATTENDED, RECORD HEART RATE/O2 SAT/RESP ANAL/SLEEP TIME: ICD-10-PCS | Mod: 26,HCNC,, | Performed by: PSYCHIATRY & NEUROLOGY

## 2020-03-30 ENCOUNTER — TELEPHONE (OUTPATIENT)
Dept: PODIATRY | Facility: CLINIC | Age: 50
End: 2020-03-30

## 2020-03-30 ENCOUNTER — TELEPHONE (OUTPATIENT)
Dept: SLEEP MEDICINE | Facility: CLINIC | Age: 50
End: 2020-03-30

## 2020-03-30 ENCOUNTER — PATIENT MESSAGE (OUTPATIENT)
Dept: SLEEP MEDICINE | Facility: CLINIC | Age: 50
End: 2020-03-30

## 2020-03-30 NOTE — PROCEDURES
"See imported Sleep Study result in "Chart Review" under the "Media tab".      (This Sleep Study was interpreted by a Board Certified Sleep Specialist who conducted an epoch-by-epoch review of the entire raw data recording.)     (The indication for this sleep study was reviewed and deemed appropriate by [facility] Practice Parameters or other reasons by a Board Certified Sleep Specialist.)        PHYSICIAN INTERPRETATION AND COMMENTS: Findings are consistent with mild, obstructive sleep apnea (SHARATH)  (G47.33), by overall AHI (apnea hypopnea index). However, findings on this study suggest that the degree of sleep disordered  breathing is in the moderate severity range, when RDI is measured. This study was technically adequate to allow for interpretation.  CLINICAL HISTORY: 49 year old female presented with: 15 inch neck, BMI of 64, an [place]berry sleepiness score of 9, history of  hypertension, diabetes, depression, a previous diagnosis of SHARATH and symptoms of nocturnal snoring, waking up choking and  witnessed apneas. Based on the clinical history, the patient has a high pre-test probability of having severe SHARATH.  SLEEP STUDY FINDINGS: Patient underwent a one night Home Sleep Test and by behavioral criteria, slept for approximately  4.6 hours, with a sleep latency of 24 minutes and a sleep efficiency of 66.1%. Mild sleep disordered breathing (AHI=6) is noted  based on a 4% hypopnea desaturation criteria. The patient slept supine 35.4% of the night based on valid recording time of 4.63  hours and is 1.4 times as likely to have apneas/hypopneas when supine. When considering more subtle measures of sleep disordered  breathing, the overall respiratory disturbance index is also mild (RDI=16) based on a 1% hypopnea desaturation criteria with  confirmation by surrogate arousal indicators, predominantly in the supine position (23 events/hour). The apneas/hypopneas are  accompanied by minimal oxygen desaturation (percent time below 90% " SpO2: 0.0%, Min SpO2: 90.6%). The average desaturation  across all sleep disordered breathing events is 2.7%. Snoring occurs for 33.3% (30 dB) of the study, 27.1% is very loud. The mean  pulse rate is 89 BPM, with frequent pulse rate variability (49 events with >= 6 BPM increase/decrease per hour).  TREATMENT CONSIDERATIONS: Consider trial of Auto-titrating CPAP 6-20 cm, mask of patient's choice, and heated  humidification. If patient has difficulty with CPAP adherence or ongoing SHARATH symptoms or despite CPAP adherence, then consider  an in-lab titration sleep study in order to determine optimal fixed CPAP setting. Alternatively consider oral appliance fitted by a  dentist specializing in these devices, or surgical consultation for uvulopalatopharyngoplasty (UPPP) for treatment of obstructive sleep  apnea.  DISEASE MANAGEMENT CONSIDERATIONS: Definitive treatment for SHARATH is recommended. Consider Sleep Clinic  referral for SHARATH management.  Signature:

## 2020-03-30 NOTE — TELEPHONE ENCOUNTER
I called patient and left  message her phone .  to see how her foot is feeling. I was out of the office since 3/26/2020 returned on 03/30/2020 message was in my inbox while was off.

## 2020-03-30 NOTE — TELEPHONE ENCOUNTER
Dear MA,      Please call the patient to inform re: recent sleep study was positive for obstructive sleep apnea    I can order the machine (see back in 7-8 weeks), or can schedule to discuss results.    PLEASE REFER THE PATIENT TO  My [facility] to see the report and my explanations.      Please forward the message back to me if you could not reach the patient and could not leave a voicemail.    TY!

## 2020-04-01 ENCOUNTER — TELEPHONE (OUTPATIENT)
Dept: SLEEP MEDICINE | Facility: CLINIC | Age: 50
End: 2020-04-01

## 2020-04-02 ENCOUNTER — PATIENT OUTREACH (OUTPATIENT)
Dept: ADMINISTRATIVE | Facility: OTHER | Age: 50
End: 2020-04-02

## 2020-04-02 ENCOUNTER — TELEPHONE (OUTPATIENT)
Dept: GASTROENTEROLOGY | Facility: CLINIC | Age: 50
End: 2020-04-02

## 2020-04-02 ENCOUNTER — OFFICE VISIT (OUTPATIENT)
Dept: SLEEP MEDICINE | Facility: CLINIC | Age: 50
End: 2020-04-02
Payer: MEDICARE

## 2020-04-02 DIAGNOSIS — G47.33 OSA (OBSTRUCTIVE SLEEP APNEA): Primary | ICD-10-CM

## 2020-04-02 PROCEDURE — 99213 PR OFFICE/OUTPT VISIT, EST, LEVL III, 20-29 MIN: ICD-10-PCS | Mod: HCNC,95,, | Performed by: PSYCHIATRY & NEUROLOGY

## 2020-04-02 PROCEDURE — 99213 OFFICE O/P EST LOW 20 MIN: CPT | Mod: HCNC,95,, | Performed by: PSYCHIATRY & NEUROLOGY

## 2020-04-02 NOTE — PROGRESS NOTES
"   INTERVAL HISTORY:    04/02/2020    The patient location is: home  The chief complaint leading to consultation is: results follow up  Visit type: Virtual visit with synchronous audio and video  Total time spent with patient: 15  Each patient to whom he or she provides medical services by telemedicine is:  (1) informed of the relationship between the physician and patient and the respective role of any other health care provider with respect to management of the patient; and (2) notified that he or she may decline to receive medical services by telemedicine and may withdraw from such care at any time.    Notes:     Yanni Kaiser  Is being seen today to discuss her sleep study results. Recent HST showed ongoing SHARATH with significant improvement from her baseline - mild AHI, moderate RDi, and significantly improved SO2 min. She has lost 20 lbs since her baseline PSG and is intending to loose 20 more lbs in the next 3 months.  ESS today was 9/24.  She still wakes up 2-3 times per night and takes 5-15 minutes to return to sleep. Denied daytime sleepiness and fatigue.  Still has hard to control HTN - recently entered remote monitoring BP program through [facility].  Sleep study were discussed during this tele-visit with graphs and chart demonstration, all the questions were answered.     03/24/2020:  The patient has not presented any new complaints since the previous visit.   She was admitted for flu this year with flu/asthma exacerbation. Otherwise, her asthma is controlled.  Lost 40 lbs since last time. Sleepiness has improving - occasional sleepiness in the afternoon after a busy morning.   Denied drowsy driving.  Her HTN is hard to contol - her PMD sent her to re-visit SHARATH treatment.  Not sure of snoring, gasping - sleeps alone. I have discussed her previous study results from [facility] today. The patient has lost 40 lbs since that time (seeing Dr. Salcedo, taking Adipex).  She is still on the "weigh loss curve". " Reports improved sleep quality, and only occasional afternoon drowsiness.  She was still referred to the sleep clinic due to hard to control HTN.   ESS 9/24.      08/21/2018 INITIAL HISTORY OF PRESENT ILLNESS:  Yanni Kaiser is a 49 y.o. female is here to be evaluated for a sleep disorder.       CHIEF COMPLAINT:      The patient's complaints include excessive daytime sleepiness, excessive daytime fatigue, snoring,  witnessed breathing pauses,  gasping for air in sleep and interrupted sleep.  She was fisrt diagnosed with SHARATH over 10 years ago; most recent study 2-3 years ago in [place] ([facility]). She could not tolerate CPAp during titration and did not follow up.  Still reports significant daytime sleepiness. At some point she saw Dr. Harrell - for asthma. PSG was requested from [facility], but I do not see it in [facility].  The patient is very reluctant to repeat a sleep study (found the experience traumatic), and would like us to keep trying to obtain the results from [facility].       EPWORTH SLEEPINESS SCALE 8/21/2018   Sitting and reading 3   Watching TV 2   Sitting, inactive in a public place (e.g. a theatre or a meeting) 1   As a passenger in a car for an hour without a break 3   Lying down to rest in the afternoon when circumstances permit 2   Sitting and talking to someone 1   Sitting quietly after a lunch without alcohol 3   In a car, while stopped for a few minutes in traffic 2   Total score 17       SLEEP ROUTINE AND LIFESTYLE 04/02/2020 :  Sleep Clinic New Patient 8/21/2018   What time do you go to bed on a week day? (Give a range) 930-10pm   What time do you go to bed on a day off? (Give a range) 930-10pm   How long does it take you to fall asleep? (Give a range) instant-never   On average, how many times per night do you wake up? 3-4   How long does it take you to fall back into sleep? (Give a range) instant   What time do you wake up to start your day on a week day? (Give a range)  4am   What time do you wake up to start your day on a day off? (Give a range) 7-8am   What time do you get out of bed? (Give a range) 7-8   On average, how many hours do you sleep? 4-5   On average, how many naps do you take per day? 0   Rate your sleep quality from 0 to 5 (0-poor, 5-great). 3   Have you experienced:  Weight gain?   How much weight have you lost or gained (in lbs.) in the last year? 10   On average, how many times per night do you go to the bathroom?  3   Have you ever had a sleep study/CPAP machine/surgery for sleep apnea? Yes   Have you ever had a CPAP machine for sleep apnea? Yes   Have you ever had surgery for sleep apnea? No       Sleep Clinic ROS  8/21/2018   Difficulty breathing through the nose?  Sometimes   Sore throat or dry mouth in the morning? Yes   Irregular or very fast heart beat?  No   Shortness of breath?  Yes   Acid reflux? Yes   Body aches and pains?  Yes   Morning headaches? Yes   Dizziness? Yes   Mood changes?  Yes   Do you exercise?  No   Do you feel like moving your legs a lot?  Yes           Social History:      Occupation:  Bed partner:        PREVIOUS SLEEP STUDIES:   PSG 6/1/15: Significant Obstructive sleep apnea (SHARATH) with AHI (apnea hypopnea Index) of 27 and SaO2 of 71% (weight  n/a).  ([facility])    HST2/24: Significant Obstructive sleep apnea (SHARATH) with AHI (apnea hypopnea Index) of 6 ; RDI of 16and SaO2 of 90 (weight  325 lbs).        DME:       PAST MEDICAL HISTORY:    Active Ambulatory Problems     Diagnosis Date Noted    SHARATH (obstructive sleep apnea) 03/27/2014    Leg varices 03/27/2014    Migraine 09/03/2014    Morbid obesity with BMI of 50.0-59.9, adult 09/03/2014    Cigarette nicotine dependence without complication 09/03/2014    Anemia of chronic disease 06/10/2015    Mild protein malnutrition 06/10/2015    Idiopathic intracranial hypertension 02/04/2016    Essential hypertension 02/04/2016    Hyperlipidemia 07/30/2016    GERD (gastroesophageal  reflux disease) 07/30/2016    Epilepsy 07/30/2016    Plantar fasciitis, bilateral 01/11/2017    Type 2 diabetes mellitus with stage 3 chronic kidney disease, with long-term current use of insulin 03/08/2017    Recurrent major depressive disorder, in full remission 01/22/2018    History of suicide attempt 08/21/2018    Opioid dependence 09/04/2018    Lumbar stenosis 09/04/2018    S/P D&C (status post dilation and curettage) 10/16/2018    Simple endometrial hyperplasia 01/01/2018    Refractive error 03/26/2019    Dysphagia 11/05/2019    Plantar fasciitis 11/18/2019    Influenza A 01/16/2020    Acute respiratory distress 01/16/2020    Stage 3 chronic kidney disease 01/16/2020     Resolved Ambulatory Problems     Diagnosis Date Noted    Body mass index 60.0-69.9, adult 03/27/2014    Essential hypertension, benign 03/27/2014    Mild intermittent asthma 03/27/2014    Body mass index 60.0-69.9, adult 05/08/2014    Type 2 diabetes mellitus with renal manifestations, controlled 05/08/2014    Chronic kidney disease, stage II (mild) 05/08/2014    Seizure disorder 09/03/2014    Type 2 diabetes mellitus, controlled 09/03/2014    Exacerbation of asthma 06/09/2015    Acute chest pain 04/28/2016    Altered mental status 07/29/2016    Seizure 07/30/2016    Papilledema associated with increased intracranial pressure 01/13/2017    Diabetic ketoacidosis without coma associated with type 2 diabetes mellitus 02/21/2017    DUSTIN (acute kidney injury) 02/21/2017    Numbness of right hand 02/22/2017    Localz-rltd symptomatic epilepsy w cmplx part sz, intract, wo status 07/10/2017    Complex partial epilepsy, intractable 07/06/2017    Simple chronic bronchitis 01/22/2018    Asthma with severe exacerbation 09/04/2018    Acute respiratory failure with hypoxia 09/04/2018    Antalgic gait 10/04/2018    DUB (dysfunctional uterine bleeding) 10/16/2018    Moderate asthma with acute exacerbation 01/17/2020     Foot pain 2020    Decreased strength, endurance, and mobility 2020    Ankle weakness 2020    Tightness of left gastrocnemius muscle 2020     Past Medical History:   Diagnosis Date    Allergy     Anemia     Asthma     Back pain     Chronic bronchitis     Cigarette smoker     Depression     Diabetes with neurologic complications     High cholesterol     Hyperprolactinemia     Hypertension     Obese body habitus     Pseudotumor cerebri     Renal manifestation of secondary diabetes mellitus     Respiratory failure     Seizures     Sleep apnea     Smoker     Tobacco dependence                 PAST SURGICAL HISTORY:    Past Surgical History:   Procedure Laterality Date    BREAST CYST ASPIRATION       SECTION      X 1    CHOLECYSTECTOMY      COLONOSCOPY      COLONOSCOPY N/A 2019    Procedure: COLONOSCOPY;  Surgeon: Jagruti Sweeney MD;  Location: [facility] ([address] [place] [address]);  Service: Endoscopy;  Laterality: N/A;  BMI is 63/gastroparesis/3 days full liquid diet 1 day clear liquid see telephone encounter by Ciara Brown     ESOPHAGEAL MANOMETRY WITH MEASUREMENT OF IMPEDANCE N/A 2019    Procedure: MANOMETRY-ESOPHAGEAL-WITH IMPEDANCE;  Surgeon: Jagruti Sweeney MD;  Location: [facility] ([address] [place] [address]);  Service: Endoscopy;  Laterality: N/A;  Hold Narcotics x 1 days   Hold TCA x 1 days  Propofol only. No fentanyl or benzodiazepine during sedation. If additional sedation needed, discuss with Dr. Sweeney.  10/29 - pt confirmed appt    ESOPHAGOGASTRODUODENOSCOPY N/A 2019    Procedure: EGD (ESOPHAGOGASTRODUODENOSCOPY);  Surgeon: Jagruti Sweeney MD;  Location: [facility] ([address] [place] [address]);  Service: Endoscopy;  Laterality: N/A;  BMI is 63/gastroparesis/3 days full liquid diet 1 day clear liquid see telephone encounter by Ciara Brown     ESOPHAGOGASTRODUODENOSCOPY N/A 2019    Procedure: ESOPHAGOGASTRODUODENOSCOPY (EGD);  Surgeon: Jagruti Sweeney MD;  Location:  [facility] ENDO (2ND FLR);  Service: Endoscopy;  Laterality: N/A;  EGD with EndoFlip /+/- Botox  2nd floor for gastroparesis/BMI 63 **367lbs**  Bariatric Stretcher needed  Manometry probe to be placed endoscopically during EGD procedure  Due to change in protocol, Full liquid diet for 3 days/ 1 day of clear liquids  Hi    FOOT FRACTURE SURGERY Left     HYSTEROSCOPY WITH DILATION AND CURETTAGE OF UTERUS N/A 10/16/2018    [facility]    PLANTAR FASCIOTOMY Left 11/18/2019    Procedure: FASCIOTOMY, PLANTAR;  Surgeon: Valentino Orourke DPM;  Location: [facility] OR 2ND FLR;  Service: Podiatry;  Laterality: Left;    RETINAL LASER PROCEDURE Left     SINUS SURGERY      UPPER GASTROINTESTINAL ENDOSCOPY           FAMILY HISTORY:                Family History   Problem Relation Age of Onset    Hypertension Mother     Diabetes Mother     Colon cancer Mother 50    Colon polyps Mother     Heart disease Father     COPD Father     Heart attack Father     Hypertension Father     Ulcers Father     Cancer Maternal Grandmother     Breast cancer Maternal Grandmother     Colon cancer Maternal Grandmother     Colon polyps Maternal Grandmother     No Known Problems Paternal Grandmother     No Known Problems Brother     No Known Problems Maternal Aunt     No Known Problems Maternal Uncle     No Known Problems Paternal Aunt     No Known Problems Paternal Uncle     No Known Problems Maternal Grandfather     No Known Problems Paternal Grandfather     Celiac disease Neg Hx     Cirrhosis Neg Hx     Crohn's disease Neg Hx     Cystic fibrosis Neg Hx     Esophageal cancer Neg Hx     Hemochromatosis Neg Hx     Inflammatory bowel disease Neg Hx     Irritable bowel syndrome Neg Hx     Liver cancer Neg Hx     Liver disease Neg Hx     Rectal cancer Neg Hx     Stomach cancer Neg Hx     Ulcerative colitis Neg Hx     Jonnie's disease Neg Hx     Tuberculosis Neg Hx     Lymphoma Neg Hx     Scleroderma Neg Hx     Rheum arthritis Neg  Hx     Melanoma Neg Hx     Multiple sclerosis Neg Hx     Psoriasis Neg Hx     Lupus Neg Hx     Skin cancer Neg Hx     Amblyopia Neg Hx     Blindness Neg Hx     Cataracts Neg Hx     Glaucoma Neg Hx     Macular degeneration Neg Hx     Retinal detachment Neg Hx     Strabismus Neg Hx     Stroke Neg Hx     Thyroid disease Neg Hx        SOCIAL HISTORY:          Tobacco:   Social History     Tobacco Use   Smoking Status Current Every Day Smoker    Packs/day: 1.00    Years: 27.00    Pack years: 27.00    Types: Cigarettes    Start date: 1/1/1992   Smokeless Tobacco Never Used   Tobacco Comment    1/2 a pack if her days are good       alcohol use:    Social History     Substance and Sexual Activity   Alcohol Use Yes    Alcohol/week: 0.0 standard drinks    Frequency: Never    Comment: socially                   ALLERGIES:  Review of patient's allergies indicates:  No Known Allergies    CURRENT MEDICATIONS:    Current Outpatient Medications   Medication Sig Dispense Refill    acetaZOLAMIDE (DIAMOX) 500 mg CpSR Take 1 capsule (500 mg total) by mouth 2 (two) times daily. 360 capsule 1    albuterol (PROVENTIL/VENTOLIN HFA) 90 mcg/actuation inhaler Inhale 2 puffs into the lungs every 6 (six) hours as needed. Rescue 54 g 0    albuterol-ipratropium (DUO-NEB) 2.5 mg-0.5 mg/3 mL nebulizer solution Take 3 mLs by nebulization every 6 (six) hours as needed for Wheezing or Shortness of Breath. Rescue 100 mL 3    atorvastatin (LIPITOR) 40 MG tablet Take 1 tablet (40 mg total) by mouth once daily. 90 tablet 3    blood sugar diagnostic Strp 1 each by Misc.(Non-Drug; Combo Route) route 4 (four) times daily before meals and nightly. ACCU CHEK ELVIA PLUS METER 200 each 3    blood-glucose meter (ACCU-CHEK ELVIA PLUS METER) Misc TEST FOUR TIMES DAILY BEFORE MEALS AND EVERY NIGHT 90 each 1    budesonide-formoterol 160-4.5 mcg (SYMBICORT) 160-4.5 mcg/actuation HFAA Inhale 2 puffs into the lungs every 12 (twelve) hours.  Controller 1 Inhaler 5    diclofenac sodium (VOLTAREN) 1 % Gel Apply 2 g topically once daily. 100 g 3    fluticasone propionate (FLONASE) 50 mcg/actuation nasal spray 1 spray (50 mcg total) by Each Nare route once daily. 15.8 mL 2    hydrOXYzine (ATARAX) 50 MG tablet       lactulose (CHRONULAC) 10 gram/15 mL solution TAKE 15 MLS BY MOUTH THREE TIMES DAILY AS NEEDED 473 mL 6    lancets (ACCU-CHEK SOFTCLIX LANCETS) Misc 1 Device by Misc.(Non-Drug; Combo Route) route 4 (four) times daily. 200 each 3    levocetirizine (XYZAL) 5 MG tablet Take 1 tablet (5 mg total) by mouth every evening. 90 tablet 0    levoFLOXacin (LEVAQUIN) 500 MG tablet Take 1 tablet (500 mg total) by mouth once daily. 14 tablet 0    losartan (COZAAR) 100 MG tablet TAKE 1 TABLET(100 MG) BY MOUTH EVERY DAY 90 tablet 0    meloxicam (MOBIC) 15 MG tablet TAKE 1 TABLET(15 MG) BY MOUTH DAILY AS NEEDED FOR HEADACHE 90 tablet 1    metFORMIN (GLUCOPHAGE-XR) 500 MG XR 24hr tablet TAKE 2 TABLETS(1000 MG) BY MOUTH TWICE DAILY WITH MEALS 360 tablet 0    montelukast (SINGULAIR) 10 mg tablet TAKE 1 TABLET(10 MG) BY MOUTH EVERY EVENING 30 tablet 0    ondansetron (ZOFRAN) 8 MG tablet Take 1 tablet (8 mg total) by mouth every 8 (eight) hours as needed for Nausea. 90 tablet 5    oxyCODONE-acetaminophen (PERCOCET) 5-325 mg per tablet Take 1 tablet by mouth every 12 (twelve) hours as needed for Pain. 40 tablet 0    oxyCODONE-acetaminophen (PERCOCET) 5-325 mg per tablet Take 1 tablet by mouth every 4 (four) hours as needed for Pain. 40 tablet 0    pantoprazole (PROTONIX) 40 MG tablet Take 40 mg by mouth once daily.   3    pantoprazole (PROTONIX) 40 MG tablet TAKE 1 TABLET(40 MG) BY MOUTH TWICE DAILY 60 tablet 0    prucalopride 2 mg Tab Take 1 tablet by mouth once daily. 90 tablet 3    QUEtiapine (SEROQUEL) 25 MG Tab Take 1 tablet (25 mg total) by mouth once daily. 90 tablet 0    semaglutide (OZEMPIC) 1 mg/dose (2 mg/1.5 mL) PnIj Inject 1 mg subq  weekly. 9 mL 1    sumatriptan (IMITREX) 50 MG tablet Take 1 tab at onset of headaches.  If no improvement in 2 hours, take another.  Do not take more than 2 tabs in 24 hours. 9 tablet 5    topiramate (TOPAMAX) 100 MG tablet TAKE 3 TABLETS BY MOUTH TWICE DAILY 180 tablet 11    traMADol (ULTRAM-ER) 100 MG Tb24 Take 1 tablet (100 mg total) by mouth daily as needed. 14 tablet 0    triamcinolone acetonide 0.1% (KENALOG) 0.1 % ointment Apply topically 2 (two) times daily. for 7 days (Patient not taking: Reported on 3/11/2020) 30 g 0    venlafaxine (EFFEXOR-XR) 75 MG 24 hr capsule TAKE 1 CAPSULE(75 MG) BY MOUTH EVERY EVENING 90 capsule 3     No current facility-administered medications for this visit.                       PHYSICAL EXAM:  There were no vitals taken for this visit.  GENERAL: Overweight body habitus, well groomed.  HEENT:   HEENT:  Conjunctivae are non-erythematous; Pupils equal, round, and reactive to light; Nose is symmetrical; Nasal mucosa is pink and moist; Septum is midline; Inferior turbinates are hypertrophied; Nasal airflow is diminshed; Posterior pharynx is pink; Modified Mallampati:III-IV; Posterior palate is elevated; Tonsils not visualized; Uvula is wide and elongated;Tongue is enlarged; Dentition is fair; No TMJ tenderness; Jaw opening and protrusion without click and without discomfort.  NECK: Supple. Neck circumference is 15 inches. No thyromegaly. No palpable nodes.     SKIN: On face and neck: No abrasions, no rashes, no lesions.  No subcutaneous nodules are palpable.  RESPIRATORY: Chest is clear to auscultation.  Normal chest expansion and non-labored breathing at rest.  CARDIOVASCULAR: Normal S1, S2.  No murmurs, gallops or rubs. No carotid bruits bilaterally.  No edema. No clubbing. No cyanosis.    NEURO: Oriented to time, place and person. Normal attention span and concentration. Gait normal.    PSYCH: Affect is full. Mood is normal  MUSCULOSKELETAL: Moves 4 extremities. Gait normal.  "        Using [facility]: pending      ASSESSMENT:    1. SHARATH (obstructive sleep apnea). The patient symptomatically has  excessive daytime sleepiness, snoring,  witnessed breathing pauses, excessive daytime fatigue, gasping for air in sleep, interrupted sleep and nocturia  with exam findings of "a crowded oral airway and elevated body mass index. The patient has medical co-morbidities of diabetes, migraine, seizure disorder which can be worsened by SHARATH. This warrants treatment.The patient's complaints include excessive daytime sleepiness, excessive daytime fatigue, snoring,  witnessed breathing pauses,  gasping for air in sleep and interrupted sleep.  She was fisrt diagnosed with SHARATH over 10 years ago; most recent study 2-3 years ago in [place] ([facility]). She could not tolerate CPAp during titration and did not follow up.  Still reports significant daytime sleepiness. At some point she saw Dr. Harrell - for asthma. PSG was requested from [facility], but I do not see it in [facility]. Lost 40 lbs since the previous study. Recent HST reveled significant improvement with residual mild to moderate SHARATH .            PLAN:    Sleep study were discussed during this tele-visit with graphs and chart demonstration, all the questions were answered.   Treatment options were disussed  The patient opted to set a goal to loose  20 more lbs weight over the next 3 months and then redo HST  Will follow up in 3 months.    More than 25 minutes of this 45 minutes visit was spent in counseling: during our discussion today, we talked about the etiology of  SHARATH (CPAP, OA for SHARATH, positional therapy, weight loss) as well as the potential ramifications of untreated sleep apnea, which could include daytime sleepiness, hypertension, heart disease and/or stroke.  We discussed potential treatment options, which could include weight loss, body positioning, continuous positive airway pressure (CPAP), or referral for surgical " consideration. Meanwhile, she  is urged to avoid supine sleep, weight gain and alcoholic beverages since all of these can worsen SHARATH.     Precautions: The patient was advised to abstain from driving should he feel sleepy or drowsy.    Follow up: after the HST.    Thank you for allowing me the opportunity to participate in the care of your patient.    This visit summary will be sent to referring provider via inbasket

## 2020-04-02 NOTE — PROGRESS NOTES
Chart reviewed.   Immunizations: Triggered Imm Registry     Orders placed: n/a  Upcoming appts to satisfy KATE topics: n/a

## 2020-04-07 ENCOUNTER — TELEPHONE (OUTPATIENT)
Dept: GASTROENTEROLOGY | Facility: CLINIC | Age: 50
End: 2020-04-07

## 2020-04-08 DIAGNOSIS — N18.30 TYPE 2 DIABETES MELLITUS WITH STAGE 3 CHRONIC KIDNEY DISEASE, WITH LONG-TERM CURRENT USE OF INSULIN: ICD-10-CM

## 2020-04-08 DIAGNOSIS — E11.22 TYPE 2 DIABETES MELLITUS WITH STAGE 3 CHRONIC KIDNEY DISEASE, WITH LONG-TERM CURRENT USE OF INSULIN: ICD-10-CM

## 2020-04-08 DIAGNOSIS — Z79.4 TYPE 2 DIABETES MELLITUS WITH STAGE 3 CHRONIC KIDNEY DISEASE, WITH LONG-TERM CURRENT USE OF INSULIN: ICD-10-CM

## 2020-04-10 RX ORDER — OXYCODONE AND ACETAMINOPHEN 5; 325 MG/1; MG/1
1 TABLET ORAL EVERY 4 HOURS PRN
Qty: 40 TABLET | Refills: 0 | Status: SHIPPED | OUTPATIENT
Start: 2020-04-10 | End: 2020-04-30

## 2020-04-14 DIAGNOSIS — A04.8 H. PYLORI INFECTION: ICD-10-CM

## 2020-04-14 RX ORDER — PANTOPRAZOLE SODIUM 40 MG/1
40 TABLET, DELAYED RELEASE ORAL 2 TIMES DAILY
Qty: 60 TABLET | Refills: 11 | Status: SHIPPED | OUTPATIENT
Start: 2020-04-14 | End: 2020-05-13

## 2020-04-25 ENCOUNTER — PATIENT MESSAGE (OUTPATIENT)
Dept: ADMINISTRATIVE | Facility: OTHER | Age: 50
End: 2020-04-25

## 2020-04-26 DIAGNOSIS — G89.29 CHRONIC NONINTRACTABLE HEADACHE, UNSPECIFIED HEADACHE TYPE: ICD-10-CM

## 2020-04-26 DIAGNOSIS — R51.9 CHRONIC NONINTRACTABLE HEADACHE, UNSPECIFIED HEADACHE TYPE: ICD-10-CM

## 2020-04-26 RX ORDER — TOPIRAMATE 100 MG/1
TABLET, FILM COATED ORAL
Qty: 180 TABLET | Refills: 11 | OUTPATIENT
Start: 2020-04-26

## 2020-04-28 ENCOUNTER — PATIENT OUTREACH (OUTPATIENT)
Dept: ADMINISTRATIVE | Facility: OTHER | Age: 50
End: 2020-04-28

## 2020-04-29 ENCOUNTER — PATIENT OUTREACH (OUTPATIENT)
Dept: ADMINISTRATIVE | Facility: HOSPITAL | Age: 50
End: 2020-04-29

## 2020-04-30 ENCOUNTER — OFFICE VISIT (OUTPATIENT)
Dept: PODIATRY | Facility: CLINIC | Age: 50
End: 2020-04-30
Payer: MEDICARE

## 2020-04-30 VITALS
BODY MASS INDEX: 50.02 KG/M2 | SYSTOLIC BLOOD PRESSURE: 129 MMHG | HEIGHT: 64 IN | DIASTOLIC BLOOD PRESSURE: 87 MMHG | HEART RATE: 69 BPM | WEIGHT: 293 LBS

## 2020-04-30 DIAGNOSIS — G89.4 CHRONIC PAIN SYNDROME: Primary | ICD-10-CM

## 2020-04-30 PROCEDURE — 99999 PR PBB SHADOW E&M-EST. PATIENT-LVL III: ICD-10-PCS | Mod: PBBFAC,HCNC,, | Performed by: PODIATRIST

## 2020-04-30 PROCEDURE — 3074F SYST BP LT 130 MM HG: CPT | Mod: HCNC,CPTII,S$GLB, | Performed by: PODIATRIST

## 2020-04-30 PROCEDURE — 3008F BODY MASS INDEX DOCD: CPT | Mod: HCNC,CPTII,S$GLB, | Performed by: PODIATRIST

## 2020-04-30 PROCEDURE — 99999 PR PBB SHADOW E&M-EST. PATIENT-LVL III: CPT | Mod: PBBFAC,HCNC,, | Performed by: PODIATRIST

## 2020-04-30 PROCEDURE — 99214 PR OFFICE/OUTPT VISIT, EST, LEVL IV, 30-39 MIN: ICD-10-PCS | Mod: HCNC,S$GLB,, | Performed by: PODIATRIST

## 2020-04-30 PROCEDURE — 3074F PR MOST RECENT SYSTOLIC BLOOD PRESSURE < 130 MM HG: ICD-10-PCS | Mod: HCNC,CPTII,S$GLB, | Performed by: PODIATRIST

## 2020-04-30 PROCEDURE — 3079F DIAST BP 80-89 MM HG: CPT | Mod: HCNC,CPTII,S$GLB, | Performed by: PODIATRIST

## 2020-04-30 PROCEDURE — 3079F PR MOST RECENT DIASTOLIC BLOOD PRESSURE 80-89 MM HG: ICD-10-PCS | Mod: HCNC,CPTII,S$GLB, | Performed by: PODIATRIST

## 2020-04-30 PROCEDURE — 99214 OFFICE O/P EST MOD 30 MIN: CPT | Mod: HCNC,S$GLB,, | Performed by: PODIATRIST

## 2020-04-30 PROCEDURE — 3008F PR BODY MASS INDEX (BMI) DOCUMENTED: ICD-10-PCS | Mod: HCNC,CPTII,S$GLB, | Performed by: PODIATRIST

## 2020-04-30 RX ORDER — OXYCODONE AND ACETAMINOPHEN 7.5; 325 MG/1; MG/1
1 TABLET ORAL EVERY 12 HOURS PRN
Qty: 40 TABLET | Refills: 0 | Status: SHIPPED | OUTPATIENT
Start: 2020-04-30 | End: 2020-05-13 | Stop reason: SDUPTHER

## 2020-05-05 NOTE — PROGRESS NOTES
Subjective:      Patient ID: Yanni Kaiser is a 49 y.o. female.    Chief Complaint: Foot Pain (both feet ) and Diabetes Mellitus    She is here for follow-up bilateral foot pain.  She previously had a plantar fasciectomy.  She was doing quite well for some time and had a recent new injury.  She also was complaining of diffuse muscle pain throughout upper and lower extremities.  She states this has been going on for approximately 2 months.    Review of Systems   Constitution: Negative for chills, fever and malaise/fatigue.   HENT: Negative for hearing loss.    Cardiovascular: Negative for claudication.   Respiratory: Negative for shortness of breath.    Skin: Negative for flushing and rash.   Musculoskeletal: Negative for joint pain and myalgias.        Pain to both feet   Neurological: Negative for loss of balance, numbness, paresthesias and sensory change.   Psychiatric/Behavioral: Negative for altered mental status.           Objective:      Physical Exam   Constitutional: She is oriented to person, place, and time. She appears well-developed and well-nourished.   Cardiovascular:   Pulses:       Dorsalis pedis pulses are 2+ on the right side, and 2+ on the left side.        Posterior tibial pulses are 2+ on the right side, and 2+ on the left side.   Musculoskeletal:        Right knee: She exhibits no swelling and no ecchymosis.        Left knee: She exhibits no swelling and no ecchymosis.        Right ankle: She exhibits normal range of motion, no swelling, no ecchymosis and normal pulse. No lateral malleolus, no medial malleolus and no head of 5th metatarsal tenderness found. Achilles tendon exhibits no pain, no defect and normal Copeland's test results.        Left ankle: She exhibits normal range of motion, no swelling, no ecchymosis and normal pulse. No lateral malleolus, no medial malleolus and no head of 5th metatarsal tenderness found. Achilles tendon exhibits no pain and normal Copeland's test  results.        Right lower leg: She exhibits no tenderness, no bony tenderness, no swelling, no edema and no deformity.        Left lower leg: She exhibits no tenderness, no swelling and no edema.        Right foot: There is normal range of motion and no deformity.        Left foot: There is normal range of motion and no deformity.   Pain along the plantar aspect of the left foot. Maximal tenderness is along the small plantar fibroma at the distal medial band of the plantar fascia. Tenderness tracking proximally along the medial band and into the tarsal tunnel.    Right foot has tenderness along the plantar aspect of the foot and into the tarsal tunnel.  In addition, she has tenderness overlying the anterior medial anterior lateral and posterior compartments of both lower extremities.  Is tender with pinpoint palpation.   Feet:   Right Foot:   Protective Sensation: 5 sites tested. 5 sites sensed.   Left Foot:   Protective Sensation: 5 sites tested. 5 sites sensed.   Neurological: She is alert and oriented to person, place, and time.   Gross sensation intact to b/L lower extremities  (+) Tinel's sign: L PT, DP and R SP, DP, PT  Light touch intact.  MMT 5/5 DF, PF, Inv, Ev  Normal muscle tone.  No signs of atrophy.  No tremor or spasticity.     Skin: Skin is warm. Capillary refill takes more than 3 seconds. No abrasion, no bruising, no burn and no ecchymosis noted.   No open lesions noted to b/L lower extremities.     Examination of the skin reveals no evidence of significant rashes, open lesions, suspicious appearing nevi or other concerning lesions.      Psychiatric: She has a normal mood and affect. Her speech is normal and behavior is normal. She is attentive.             Assessment:       Encounter Diagnosis   Name Primary?    Chronic pain syndrome Yes         Plan:       Yanni was seen today for foot pain and diabetes mellitus.    Diagnoses and all orders for this visit:    Chronic pain syndrome  -      Ambulatory referral/consult to Rheumatology; Future  -     Ambulatory referral/consult to Physical Medicine Rehab; Future    Other orders  -     oxyCODONE-acetaminophen (PERCOCET) 7.5-325 mg per tablet; Take 1 tablet by mouth every 12 (twelve) hours as needed for Pain.      I counseled the patient on her conditions, their implications and medical management.    Patient to apply diclofenac gel and massage the plantar aspect of the left foot with frozen water bottle as instructed.    Continue stretching the plantar fascia b/l.     Referral to Rheumatology and pain management secondary to chronic pain symptoms and diffuse migratory myalgia.    .

## 2020-05-12 ENCOUNTER — PATIENT OUTREACH (OUTPATIENT)
Dept: ADMINISTRATIVE | Facility: OTHER | Age: 50
End: 2020-05-12

## 2020-05-12 NOTE — PROGRESS NOTES
Care Everywhere: updated  Immunization: n/a  Health Maintenance: n/a  Media Review: n/a  Legacy Review: n/a  Order placed: n/a  Upcoming appts:optometry 5.20.2020

## 2020-05-13 ENCOUNTER — OFFICE VISIT (OUTPATIENT)
Dept: FAMILY MEDICINE | Facility: CLINIC | Age: 50
End: 2020-05-13
Payer: MEDICARE

## 2020-05-13 DIAGNOSIS — M54.50 CHRONIC MIDLINE LOW BACK PAIN WITHOUT SCIATICA: ICD-10-CM

## 2020-05-13 DIAGNOSIS — B96.89 ACUTE BACTERIAL SINUSITIS: Primary | ICD-10-CM

## 2020-05-13 DIAGNOSIS — J01.90 ACUTE BACTERIAL SINUSITIS: Primary | ICD-10-CM

## 2020-05-13 DIAGNOSIS — G89.29 CHRONIC MIDLINE LOW BACK PAIN WITHOUT SCIATICA: ICD-10-CM

## 2020-05-13 PROCEDURE — 99214 OFFICE O/P EST MOD 30 MIN: CPT | Mod: HCNC,95,, | Performed by: FAMILY MEDICINE

## 2020-05-13 PROCEDURE — 99214 PR OFFICE/OUTPT VISIT, EST, LEVL IV, 30-39 MIN: ICD-10-PCS | Mod: HCNC,95,, | Performed by: FAMILY MEDICINE

## 2020-05-13 RX ORDER — DOXYCYCLINE 100 MG/1
100 CAPSULE ORAL EVERY 12 HOURS
Qty: 20 CAPSULE | Refills: 0 | Status: SHIPPED | OUTPATIENT
Start: 2020-05-13 | End: 2020-05-23

## 2020-05-13 RX ORDER — OXYCODONE AND ACETAMINOPHEN 7.5; 325 MG/1; MG/1
1 TABLET ORAL EVERY 12 HOURS PRN
Qty: 20 TABLET | Refills: 0 | Status: SHIPPED | OUTPATIENT
Start: 2020-05-13 | End: 2020-06-12

## 2020-05-13 RX ORDER — DESLORATADINE 5 MG/1
5 TABLET ORAL DAILY
Qty: 30 TABLET | Refills: 11 | Status: SHIPPED | OUTPATIENT
Start: 2020-05-13 | End: 2020-10-12

## 2020-05-13 NOTE — PROGRESS NOTES
The patient location is: car (passenger)  The chief complaint leading to consultation is: sinus infection and low back pain  Visit type: audiovisual  Total time spent with patient: 15  Each patient to whom he or she provides medical services by telemedicine is:  (1) informed of the relationship between the physician and patient and the respective role of any other health care provider with respect to management of the patient; and (2) notified that he or she may decline to receive medical services by telemedicine and may withdraw from such care at any time.    Notes:     Routine Office Visit    Patient Name: Yanni Kaiser    : 1970  MRN: [id number]    Subjective:  Yanni is a 49 y.o. female who presents today for:    1. Sinus infection  Patient presenting today with 1 month of sinus pressure, pain, and nasal congestion.  She reports to have been taking her xyzal.  She does continue smoke.  She has not had fevers, chills, or sweats.  She has a mild cough, but states it is from the post nasal drip.  She has tried flonase without any relief.  She has chronic environmental/seasonal allergies.  She states she was tested for COVID 19 and was negative    2.  Low back pain  Patient has been experiencing chronic low back pain for several weeks.  She states that she has been referred to rheumatology for chronic pain and swelling of legs an hands.  She had autoimmune work up in 2018 that was negative other than mild inflammation that could be secondary to weight and/or asthma.  The pain is localized to her lower back and does not radiate anywhere.  No weakness or numbness.  No loss of bladder or bowel control    Past Medical History  Past Medical History:   Diagnosis Date    Allergy     Anemia     Asthma     Back pain     Chronic bronchitis     Cigarette smoker     Depression     Diabetes with neurologic complications     DUB (dysfunctional uterine bleeding) 10/16/2018    GERD (gastroesophageal reflux  disease)     High cholesterol     Hyperprolactinemia     Hypertension     Influenza A 2020    Obese body habitus     Pseudotumor cerebri     Renal manifestation of secondary diabetes mellitus     Respiratory failure     Seizures     Simple endometrial hyperplasia 2018    Sleep apnea     Smoker     Tobacco dependence        Past Surgical History  Past Surgical History:   Procedure Laterality Date    BREAST CYST ASPIRATION       SECTION      X 1    CHOLECYSTECTOMY      COLONOSCOPY      COLONOSCOPY N/A 2019    Procedure: COLONOSCOPY;  Surgeon: Jagruti Sweeney MD;  Location: [facility] (2ND F[facility]);  Service: Endoscopy;  Laterality: N/A;  BMI is 63/gastroparesis/3 days full liquid diet 1 day clear liquid see telephone encounter by [facility]    ESOPHAGEAL MANOMETRY WITH MEASUREMENT OF IMPEDANCE N/A 2019    Procedure: MANOMETRY-ESOPHAGEAL-WITH IMPEDANCE;  Surgeon: Jagruti Sweeney MD;  Location: [facility] (2ND FLR);  Service: Endoscopy;  Laterality: N/A;  Hold Narcotics x 1 days   Hold TCA x 1 days  Propofol only. No fentanyl or benzodiazepine during sedation. If additional sedation needed, discuss with Dr. Sweeney.  10/29 - pt confirmed appt    ESOPHAGOGASTRODUODENOSCOPY N/A 2019    Procedure: EGD (ESOPHAGOGASTRODUODENOSCOPY);  Surgeon: Jagruti Sweeney MD;  Location: [facility] (2ND F[facility]);  Service: Endoscopy;  Laterality: N/A;  BMI is 63/gastroparesis/3 days full liquid diet 1 day clear liquid see telephone encounter by [facility]    ESOPHAGOGASTRODUODENOSCOPY N/A 2019    Procedure: ESOPHAGOGASTRODUODENOSCOPY (EGD);  Surgeon: Jagruti Sweeney MD;  Location: [facility] (2ND F[facility]);  Service: Endoscopy;  Laterality: N/A;  EGD with EndoFlip /+/- Botox  2nd floor for gastroparesis/BMI 63 **367lbs**  Bariatric Stretcher needed  Manometry probe to be placed endoscopically during EGD procedure  Due to change in protocol, Full liquid diet for 3 days/ 1 day of  clear liquids  Hi    FOOT FRACTURE SURGERY Left     HYSTEROSCOPY WITH DILATION AND CURETTAGE OF UTERUS N/A 10/16/2018    [facility]    PLANTAR FASCIOTOMY Left 11/18/2019    Procedure: FASCIOTOMY, PLANTAR;  Surgeon: Valentino Orourke DPM;  Location[facility] OR [address] [place] [address];  Service: Podiatry;  Laterality: Left;    RETINAL LASER PROCEDURE Left     SINUS SURGERY      UPPER GASTROINTESTINAL ENDOSCOPY         Family History  Family History   Problem Relation Age of Onset    Hypertension Mother     Diabetes Mother     Colon cancer Mother 50    Colon polyps Mother     Heart disease Father     COPD Father     Heart attack Father     Hypertension Father     Ulcers Father     Cancer Maternal Grandmother     Breast cancer Maternal Grandmother     Colon cancer Maternal Grandmother     Colon polyps Maternal Grandmother     No Known Problems Paternal Grandmother     No Known Problems Brother     No Known Problems Maternal Aunt     No Known Problems Maternal Uncle     No Known Problems Paternal Aunt     No Known Problems Paternal Uncle     No Known Problems Maternal Grandfather     No Known Problems Paternal Grandfather     Celiac disease Neg Hx     Cirrhosis Neg Hx     Crohn's disease Neg Hx     Cystic fibrosis Neg Hx     Esophageal cancer Neg Hx     Hemochromatosis Neg Hx     Inflammatory bowel disease Neg Hx     Irritable bowel syndrome Neg Hx     Liver cancer Neg Hx     Liver disease Neg Hx     Rectal cancer Neg Hx     Stomach cancer Neg Hx     Ulcerative colitis Neg Hx     Jonnie's disease Neg Hx     Tuberculosis Neg Hx     Lymphoma Neg Hx     Scleroderma Neg Hx     Rheum arthritis Neg Hx     Melanoma Neg Hx     Multiple sclerosis Neg Hx     Psoriasis Neg Hx     Lupus Neg Hx     Skin cancer Neg Hx     Amblyopia Neg Hx     Blindness Neg Hx     Cataracts Neg Hx     Glaucoma Neg Hx     Macular degeneration Neg Hx     Retinal detachment Neg Hx     Strabismus Neg Hx     Stroke Neg Hx      Thyroid disease Neg Hx        Social History  Social History     Socioeconomic History    Marital status:      Spouse name: Not on file    Number of children: Not on file    Years of education: Not on file    Highest education level: Not on file   Occupational History    Not on file   Social Needs    Financial resource strain: Very hard    Food insecurity:     Worry: Often true     Inability: Often true    Transportation needs:     Medical: No     Non-medical: No   Tobacco Use    Smoking status: Current Every Day Smoker     Packs/day: 1.00     Years: 27.00     Pack years: 27.00     Types: Cigarettes     Start date: 1/1/1992    Smokeless tobacco: Never Used    Tobacco comment: 1/2 a pack if her days are good   Substance and Sexual Activity    Alcohol use: Yes     Alcohol/week: 0.0 standard drinks     Frequency: Never     Comment: socially    Drug use: No    Sexual activity: Yes     Partners: Male     Birth control/protection: None   Lifestyle    Physical activity:     Days per week: Not on file     Minutes per session: Not on file    Stress: Not on file   Relationships    Social connections:     Talks on phone: More than three times a week     Gets together: Three times a week     Attends [employer] service: More than 4 times per year     Active member of club or organization: Yes     Attends meetings of clubs or organizations: More than 4 times per year     Relationship status:    Other Topics Concern    Not on file   Social History Narrative    Not on file       Current Medications  Current Outpatient Medications on File Prior to Visit   Medication Sig Dispense Refill    acetaZOLAMIDE (DIAMOX) 500 mg CpSR Take 1 capsule (500 mg total) by mouth 2 (two) times daily. 360 capsule 1    albuterol (PROVENTIL/VENTOLIN HFA) 90 mcg/actuation inhaler Inhale 2 puffs into the lungs every 6 (six) hours as needed. Rescue 54 g 0    albuterol-ipratropium (DUO-NEB) 2.5 mg-0.5 mg/3 mL nebulizer  solution Take 3 mLs by nebulization every 6 (six) hours as needed for Wheezing or Shortness of Breath. Rescue 100 mL 3    atorvastatin (LIPITOR) 40 MG tablet Take 1 tablet (40 mg total) by mouth once daily. 90 tablet 3    blood sugar diagnostic Strp 1 each by Misc.(Non-Drug; Combo Route) route 4 (four) times daily before meals and nightly. ACCU CHEK ELVIA PLUS METER 200 each 3    blood-glucose meter (ACCU-CHEK ELVIA PLUS METER) [facility] TEST FOUR TIMES DAILY BEFORE MEALS AND EVERY NIGHT 90 each 1    budesonide-formoterol 160-4.5 mcg (SYMBICORT) 160-4.5 mcg/actuation HFAA Inhale 2 puffs into the lungs every 12 (twelve) hours. Controller 1 Inhaler 5    diclofenac sodium (VOLTAREN) 1 % Gel Apply 2 g topically once daily. 100 g 3    fluticasone propionate (FLONASE) 50 mcg/actuation nasal spray 1 spray (50 mcg total) by Each Nare route once daily. 15.8 mL 2    lactulose (CHRONULAC) 10 gram/15 mL solution TAKE 15 MLS BY MOUTH THREE TIMES DAILY AS NEEDED 473 mL 6    lancets (ACCU-CHEK SOFTCLIX LANCETS) Misc 1 Device by Misc.(Non-Drug; Combo Route) route 4 (four) times daily. 200 each 3    losartan (COZAAR) 100 MG tablet TAKE 1 TABLET(100 MG) BY MOUTH EVERY DAY 90 tablet 0    metFORMIN (GLUCOPHAGE-XR) 500 MG XR 24hr tablet TAKE 2 TABLETS(1000 MG) BY MOUTH TWICE DAILY WITH MEALS 360 tablet 0    ondansetron (ZOFRAN) 8 MG tablet Take 1 tablet (8 mg total) by mouth every 8 (eight) hours as needed for Nausea. 90 tablet 5    pantoprazole (PROTONIX) 40 MG tablet Take 40 mg by mouth once daily.   3    prucalopride 2 mg Tab Take 1 tablet by mouth once daily. 90 tablet 3    QUEtiapine (SEROQUEL) 25 MG Tab Take 1 tablet (25 mg total) by mouth once daily. 90 tablet 0    semaglutide (OZEMPIC) 1 mg/dose (2 mg/1.5 mL) PnIj Inject 1 mg subq weekly. 9 mL 1    sumatriptan (IMITREX) 50 MG tablet Take 1 tab at onset of headaches.  If no improvement in 2 hours, take another.  Do not take more than 2 tabs in 24 hours. 9 tablet 5     topiramate (TOPAMAX) 100 MG tablet TAKE 3 TABLETS BY MOUTH TWICE DAILY 180 tablet 11    venlafaxine (EFFEXOR-XR) 75 MG 24 hr capsule TAKE 1 CAPSULE(75 MG) BY MOUTH EVERY EVENING 90 capsule 3    [DISCONTINUED] hydrOXYzine (ATARAX) 50 MG tablet       [DISCONTINUED] levocetirizine (XYZAL) 5 MG tablet Take 1 tablet (5 mg total) by mouth every evening. 90 tablet 0    [DISCONTINUED] levoFLOXacin (LEVAQUIN) 500 MG tablet Take 1 tablet (500 mg total) by mouth once daily. 14 tablet 0    [DISCONTINUED] meloxicam (MOBIC) 15 MG tablet TAKE 1 TABLET(15 MG) BY MOUTH DAILY AS NEEDED FOR HEADACHE 90 tablet 1    [DISCONTINUED] montelukast (SINGULAIR) 10 mg tablet TAKE 1 TABLET(10 MG) BY MOUTH EVERY EVENING 30 tablet 0    [DISCONTINUED] oxyCODONE-acetaminophen (PERCOCET) 7.5-325 mg per tablet Take 1 tablet by mouth every 12 (twelve) hours as needed for Pain. 40 tablet 0    [DISCONTINUED] pantoprazole (PROTONIX) 40 MG tablet Take 1 tablet (40 mg total) by mouth 2 (two) times daily. 60 tablet 11    [DISCONTINUED] traMADol (ULTRAM-ER) 100 MG Tb24 Take 1 tablet (100 mg total) by mouth daily as needed. 14 tablet 0    [DISCONTINUED] triamcinolone acetonide 0.1% (KENALOG) 0.1 % ointment Apply topically 2 (two) times daily. for 7 days (Patient not taking: Reported on 3/11/2020) 30 g 0     No current facility-administered medications on file prior to visit.        Allergies   Review of patient's allergies indicates:  No Known Allergies    Review of Systems (Pertinent positives)  Review of Systems   Constitutional: Negative.    HENT: Positive for congestion and sinus pain.    Skin: Negative.          There were no vitals taken for this visit.    GENERAL APPEARANCE: in no apparent distress and well developed and well nourished  HEENT: PERRL, EOMI, Sclera clear, anicteric,    RESPIRATORY: appears well, vitals normal, no respiratory distress, acyanotic, normal RR  SKIN: no rashes, no wounds, no other lesions  PSYCH: Alert, oriented x 3,  thought content appropriate, speech normal, pleasant and cooperative, good eye contact, well groomed    Assessment/Plan:  Yanni Kaiser is a 49 y.o. female who presents today for :    Diagnoses and all orders for this visit:    Acute bacterial sinusitis  -     doxycycline (VIBRAMYCIN) 100 MG Cap; Take 1 capsule (100 mg total) by mouth every 12 (twelve) hours. for 10 days  -     desloratadine (CLARINEX) 5 mg tablet; Take 1 tablet (5 mg total) by mouth once daily.    Chronic midline low back pain without sciatica  -     oxyCODONE-acetaminophen (PERCOCET) 7.5-325 mg per tablet; Take 1 tablet by mouth every 12 (twelve) hours as needed for Pain.    1.  Patient to take all medications as prescribed  2.  Follow up with rheum as scheduled  3.  Call if back pain persists  4.  She was encouraged to quit smoking  5.  Follow up 3 months or sooner if needed    Carlyle Gordillo MD

## 2020-05-15 ENCOUNTER — TELEPHONE (OUTPATIENT)
Dept: OPTOMETRY | Facility: CLINIC | Age: 50
End: 2020-05-15

## 2020-05-15 NOTE — TELEPHONE ENCOUNTER
[employer]    Member Name: VANI CAMPUZANO  Member ID: [id number]  Social Security Number: [id number]  YOB: 1970  Address: [address] [place] [address] SHAILESH BERGCLAUDIA DENNIS [facility]  Phone Number:   Gender: Female  Responsible Member: VANI CAMPUZANO    Network: [address] Medicare FF  Group: [employer] ([id number])  Benefit Level: 733

## 2020-05-18 ENCOUNTER — HOSPITAL ENCOUNTER (EMERGENCY)
Facility: HOSPITAL | Age: 50
Discharge: HOME | End: 2020-05-18
Attending: EMERGENCY MEDICINE
Payer: MEDICARE

## 2020-05-18 VITALS
OXYGEN SATURATION: 98 % | TEMPERATURE: 99 F | HEIGHT: 64 IN | RESPIRATION RATE: 18 BRPM | HEART RATE: 71 BPM | WEIGHT: 293 LBS | DIASTOLIC BLOOD PRESSURE: 82 MMHG | BODY MASS INDEX: 50.02 KG/M2 | SYSTOLIC BLOOD PRESSURE: 167 MMHG

## 2020-05-18 DIAGNOSIS — R10.9 LEFT FLANK PAIN: Primary | ICD-10-CM

## 2020-05-18 LAB
ALBUMIN SERPL BCP-MCNC: 3.2 G/DL (ref 3.5–5.2)
ALP SERPL-CCNC: 61 U/L (ref 55–135)
ALT SERPL W/O P-5'-P-CCNC: 11 U/L (ref 10–44)
ANION GAP SERPL CALC-SCNC: 7 MMOL/L (ref 8–16)
AST SERPL-CCNC: 10 U/L (ref 10–40)
B-HCG UR QL: NEGATIVE
BASOPHILS # BLD AUTO: 0.05 K/UL (ref 0–0.2)
BASOPHILS NFR BLD: 1 % (ref 0–1.9)
BILIRUB SERPL-MCNC: 0.4 MG/DL (ref 0.1–1)
BILIRUB UR QL STRIP: NEGATIVE
BUN SERPL-MCNC: 10 MG/DL (ref 6–20)
CALCIUM SERPL-MCNC: 8.8 MG/DL (ref 8.7–10.5)
CHLORIDE SERPL-SCNC: 112 MMOL/L (ref 95–110)
CLARITY UR: CLEAR
CO2 SERPL-SCNC: 22 MMOL/L (ref 23–29)
COLOR UR: YELLOW
CREAT SERPL-MCNC: 1.2 MG/DL (ref 0.5–1.4)
CTP QC/QA: YES
DIFFERENTIAL METHOD: NORMAL
EOSINOPHIL # BLD AUTO: 0.4 K/UL (ref 0–0.5)
EOSINOPHIL NFR BLD: 7.3 % (ref 0–8)
ERYTHROCYTE [DISTWIDTH] IN BLOOD BY AUTOMATED COUNT: 14.5 % (ref 11.5–14.5)
EST. GFR  (AFRICAN AMERICAN): >60 ML/MIN/1.73 M^2
EST. GFR  (NON AFRICAN AMERICAN): 54 ML/MIN/1.73 M^2
GLUCOSE SERPL-MCNC: 74 MG/DL (ref 70–110)
GLUCOSE UR QL STRIP: NEGATIVE
HCT VFR BLD AUTO: 40.5 % (ref 37–48.5)
HGB BLD-MCNC: 13.1 G/DL (ref 12–16)
HGB UR QL STRIP: NEGATIVE
IMM GRANULOCYTES # BLD AUTO: 0.02 K/UL (ref 0–0.04)
IMM GRANULOCYTES NFR BLD AUTO: 0.4 % (ref 0–0.5)
KETONES UR QL STRIP: NEGATIVE
LEUKOCYTE ESTERASE UR QL STRIP: NEGATIVE
LYMPHOCYTES # BLD AUTO: 1.8 K/UL (ref 1–4.8)
LYMPHOCYTES NFR BLD: 37.2 % (ref 18–48)
MCH RBC QN AUTO: 30.6 PG (ref 27–31)
MCHC RBC AUTO-ENTMCNC: 32.3 G/DL (ref 32–36)
MCV RBC AUTO: 95 FL (ref 82–98)
MONOCYTES # BLD AUTO: 0.5 K/UL (ref 0.3–1)
MONOCYTES NFR BLD: 9.4 % (ref 4–15)
NEUTROPHILS # BLD AUTO: 2.1 K/UL (ref 1.8–7.7)
NEUTROPHILS NFR BLD: 44.7 % (ref 38–73)
NITRITE UR QL STRIP: NEGATIVE
NRBC BLD-RTO: 0 /100 WBC
PH UR STRIP: 6 [PH] (ref 5–8)
PLATELET # BLD AUTO: 281 K/UL (ref 150–350)
PMV BLD AUTO: 9.3 FL (ref 9.2–12.9)
POTASSIUM SERPL-SCNC: 3.8 MMOL/L (ref 3.5–5.1)
PROT SERPL-MCNC: 7 G/DL (ref 6–8.4)
PROT UR QL STRIP: NEGATIVE
RBC # BLD AUTO: 4.28 M/UL (ref 4–5.4)
SODIUM SERPL-SCNC: 141 MMOL/L (ref 136–145)
SP GR UR STRIP: 1.01 (ref 1–1.03)
URN SPEC COLLECT METH UR: NORMAL
UROBILINOGEN UR STRIP-ACNC: NEGATIVE EU/DL
WBC # BLD AUTO: 4.78 K/UL (ref 3.9–12.7)

## 2020-05-18 PROCEDURE — 80053 COMPREHEN METABOLIC PANEL: CPT | Mod: HCNC

## 2020-05-18 PROCEDURE — 81003 URINALYSIS AUTO W/O SCOPE: CPT | Mod: HCNC

## 2020-05-18 PROCEDURE — 99284 EMERGENCY DEPT VISIT MOD MDM: CPT | Mod: 25,HCNC

## 2020-05-18 PROCEDURE — 63600175 PHARM REV CODE 636 W HCPCS: Mod: HCNC | Performed by: PHYSICIAN ASSISTANT

## 2020-05-18 PROCEDURE — 81025 URINE PREGNANCY TEST: CPT | Mod: HCNC | Performed by: PHYSICIAN ASSISTANT

## 2020-05-18 PROCEDURE — 96375 TX/PRO/DX INJ NEW DRUG ADDON: CPT | Mod: HCNC

## 2020-05-18 PROCEDURE — 96374 THER/PROPH/DIAG INJ IV PUSH: CPT | Mod: HCNC

## 2020-05-18 PROCEDURE — 85025 COMPLETE CBC W/AUTO DIFF WBC: CPT | Mod: HCNC

## 2020-05-18 RX ORDER — MORPHINE SULFATE 10 MG/ML
4 INJECTION INTRAMUSCULAR; INTRAVENOUS; SUBCUTANEOUS
Status: COMPLETED | OUTPATIENT
Start: 2020-05-18 | End: 2020-05-18

## 2020-05-18 RX ORDER — IBUPROFEN 800 MG/1
800 TABLET ORAL EVERY 6 HOURS PRN
Qty: 20 TABLET | Refills: 0 | Status: SHIPPED | OUTPATIENT
Start: 2020-05-18 | End: 2020-05-18 | Stop reason: SDUPTHER

## 2020-05-18 RX ORDER — IBUPROFEN 800 MG/1
800 TABLET ORAL EVERY 6 HOURS PRN
Qty: 20 TABLET | Refills: 0 | Status: SHIPPED | OUTPATIENT
Start: 2020-05-18 | End: 2020-06-12

## 2020-05-18 RX ORDER — ONDANSETRON 2 MG/ML
8 INJECTION INTRAMUSCULAR; INTRAVENOUS
Status: COMPLETED | OUTPATIENT
Start: 2020-05-18 | End: 2020-05-18

## 2020-05-18 RX ADMIN — ONDANSETRON HYDROCHLORIDE 8 MG: 2 SOLUTION INTRAMUSCULAR; INTRAVENOUS at 04:05

## 2020-05-18 RX ADMIN — MORPHINE SULFATE 4 MG: 10 INJECTION INTRAVENOUS at 04:05

## 2020-05-18 NOTE — DISCHARGE INSTRUCTIONS
You may take Ibuprofen 800 mg by mouth every 6 hours as needed for pain relief.   You may apply ice or heat compresses for 15 minutes every 2-4 hours.  Follow-up with your PCP in 2-3 days for re-evaluation.  Return to the ED for any concerning symptoms.    Our goal in the emergency department is to always give you outstanding care and exceptional service. You may receive a survey by mail or e-mail in the next week regarding your experience in our ED. We would greatly appreciate your completing and returning the survey. Your feedback provides us with a way to recognize our staff who give very good care and it helps us learn how to improve when your experience was below our aspiration of excellence.

## 2020-05-18 NOTE — ED PROVIDER NOTES
Encounter Date: 2020    SCRIBE #1 NOTE: I, July Kincaid, am scribing for, and in the presence of,  Giovanna Redmond PA-C. I have scribed the following portions of the note - Other sections scribed: HPI, ROS, PE, MDM.        History     Chief Complaint   Patient presents with    Flank Pain     pt c/o (L) sided flank pain x few days; says it radiates to her (L) side; denies N/V/D; pt tearful in triage     Efraintricivan Kaiser is an 49 y.o. female with a PMHx of asthma, chronic bronchitis, DM, gastroparesis, and sleep apnea presenting to the ED complaining of a sudden onset of worsening left-sided flank pain persisting for a few days. Patient reports symptoms of nausea that has since been relieved. Patient states complaint radiates across her left side and worsens when she lies down. Patient denies nausea, emesis, urinary problems, vaginal pain, vaginal discharge, fever, cough, chest pain, or trauma. Patient denies prior history of symptoms. Pertinent surgical history includes cholecystectomy. No known drug allergies. Patient endorses cigarette smoking (1 ppd) and social alcohol use.       The history is provided by the patient.     Review of patient's allergies indicates:  No Known Allergies  Past Medical History:   Diagnosis Date    Allergy     Anemia     Asthma     Back pain     Chronic bronchitis     Cigarette smoker     Depression     Diabetes with neurologic complications     DUB (dysfunctional uterine bleeding) 10/16/2018    GERD (gastroesophageal reflux disease)     High cholesterol     Hyperprolactinemia     Hypertension     Influenza A 2020    Obese body habitus     Pseudotumor cerebri     Renal manifestation of secondary diabetes mellitus     Respiratory failure     Seizures     Simple endometrial hyperplasia 2018    Sleep apnea     Smoker     Tobacco dependence      Past Surgical History:   Procedure Laterality Date    BREAST CYST ASPIRATION       SECTION       X 1    CHOLECYSTECTOMY      COLONOSCOPY      COLONOSCOPY N/A 1/14/2019    Procedure: COLONOSCOPY;  Surgeon: Jagruti Sweeney MD;  Location: [facility] (2ND FLR);  Service: Endoscopy;  Laterality: N/A;  BMI is 63/gastroparesis/3 days full liquid diet 1 day clear liquid see telephone encounter by [facility]    ESOPHAGEAL MANOMETRY WITH MEASUREMENT OF IMPEDANCE N/A 11/5/2019    Procedure: MANOMETRY-ESOPHAGEAL-WITH IMPEDANCE;  Surgeon: Jagruti Sweeney MD;  Location: [facility] (2ND FLR);  Service: Endoscopy;  Laterality: N/A;  Hold Narcotics x 1 days   Hold TCA x 1 days  Propofol only. No fentanyl or benzodiazepine during sedation. If additional sedation needed, discuss with Dr. Sweeney.  10/29 - pt confirmed appt    ESOPHAGOGASTRODUODENOSCOPY N/A 1/14/2019    Procedure: EGD (ESOPHAGOGASTRODUODENOSCOPY);  Surgeon: Jagruti Sweeney MD;  [facility]  Service: Endoscopy;  Laterality: N/A;  BMI is 63/gastroparesis/3 days full liquid diet 1 day clear liquid see telephone encounter by [facility]    ESOPHAGOGASTRODUODENOSCOPY N/A 11/5/2019    Procedure: ESOPHAGOGASTRODUODENOSCOPY (EGD);  Surgeon: Jagruti Sweeney MD;  Location: [facility] ([address] [place] [address]);  Service: Endoscopy;  Laterality: N/A;  EGD with EndoFlip /+/- Botox  2nd floor for gastroparesis/BMI 63 **367lbs**  Bariatric Stretcher needed  Manometry probe to be placed endoscopically during EGD procedure  Due to change in protocol, Full liquid diet for 3 days/ 1 day of clear liquids  Hi    FOOT FRACTURE SURGERY Left     HYSTEROSCOPY WITH DILATION AND CURETTAGE OF UTERUS N/A 10/16/2018    [facility]    PLANTAR FASCIOTOMY Left 11/18/2019    Procedure: FASCIOTOMY, PLANTAR;  Surgeon: Valentino Orourke DPM;  [facility];  Service: Podiatry;  Laterality: Left;    RETINAL LASER PROCEDURE Left     SINUS SURGERY      UPPER GASTROINTESTINAL ENDOSCOPY       Family History   Problem Relation Age of Onset    Hypertension Mother      Diabetes Mother     Colon cancer Mother 50    Colon polyps Mother     Heart disease Father     COPD Father     Heart attack Father     Hypertension Father     Ulcers Father     Cancer Maternal Grandmother     Breast cancer Maternal Grandmother     Colon cancer Maternal Grandmother     Colon polyps Maternal Grandmother     No Known Problems Paternal Grandmother     No Known Problems Brother     No Known Problems Maternal Aunt     No Known Problems Maternal Uncle     No Known Problems Paternal Aunt     No Known Problems Paternal Uncle     No Known Problems Maternal Grandfather     No Known Problems Paternal Grandfather     Celiac disease Neg Hx     Cirrhosis Neg Hx     Crohn's disease Neg Hx     Cystic fibrosis Neg Hx     Esophageal cancer Neg Hx     Hemochromatosis Neg Hx     Inflammatory bowel disease Neg Hx     Irritable bowel syndrome Neg Hx     Liver cancer Neg Hx     Liver disease Neg Hx     Rectal cancer Neg Hx     Stomach cancer Neg Hx     Ulcerative colitis Neg Hx     Jonnie's disease Neg Hx     Tuberculosis Neg Hx     Lymphoma Neg Hx     Scleroderma Neg Hx     Rheum arthritis Neg Hx     Melanoma Neg Hx     Multiple sclerosis Neg Hx     Psoriasis Neg Hx     Lupus Neg Hx     Skin cancer Neg Hx     Amblyopia Neg Hx     Blindness Neg Hx     Cataracts Neg Hx     Glaucoma Neg Hx     Macular degeneration Neg Hx     Retinal detachment Neg Hx     Strabismus Neg Hx     Stroke Neg Hx     Thyroid disease Neg Hx      Social History     Tobacco Use    Smoking status: Current Every Day Smoker     Packs/day: 1.00     Years: 27.00     Pack years: 27.00     Types: Cigarettes     Start date: 1/1/1992    Smokeless tobacco: Never Used    Tobacco comment: 1/2 a pack if her days are good   Substance Use Topics    Alcohol use: Yes     Alcohol/week: 0.0 standard drinks     Frequency: Never     Comment: socially    Drug use: No     Review of Systems   Constitutional: Negative  for chills and fever.   HENT: Negative for sore throat.    Eyes: Negative for visual disturbance.   Respiratory: Negative for cough and shortness of breath.    Cardiovascular: Negative for chest pain.   Gastrointestinal: Negative for abdominal pain, diarrhea, nausea and vomiting.   Genitourinary: Positive for flank pain. Negative for dysuria, vaginal discharge and vaginal pain.   Musculoskeletal: Negative for neck pain.   Skin: Negative for rash.   Allergic/Immunologic: Negative for immunocompromised state.   Neurological: Negative for headaches.   Psychiatric/Behavioral: Negative for dysphoric mood.       Physical Exam     Initial Vitals [05/18/20 1509]   BP Pulse Resp Temp SpO2   (!) 143/76 74 20 98.9 °F (37.2 °C) 99 %      MAP       --         Physical Exam    Nursing note and vitals reviewed.  Constitutional: She appears well-developed. She is not diaphoretic. She is Obese . No distress.   HENT:   Head: Normocephalic and atraumatic.   Mouth/Throat: Oropharynx is clear and moist. No oropharyngeal exudate.   Eyes: Conjunctivae and EOM are normal. Pupils are equal, round, and reactive to light.   Neck: Normal range of motion. Neck supple.   Cardiovascular: Normal rate, regular rhythm, normal heart sounds and intact distal pulses.   Pulmonary/Chest: Breath sounds normal. No respiratory distress. She has no wheezes. She has no rhonchi. She has no rales.   Abdominal: Soft. Bowel sounds are normal. She exhibits no distension. There is tenderness. There is CVA tenderness. There is no rebound and no guarding.   Abdomen soft, non-distended with tenderness to left upper quadrant as well as left costovertebral angle. No rebound or guarding.   Musculoskeletal: Normal range of motion.   Lymphadenopathy:     She has no cervical adenopathy.   Neurological: She is alert and oriented to person, place, and time. GCS score is 15. GCS eye subscore is 4. GCS verbal subscore is 5. GCS motor subscore is 6.   Skin: Skin is warm and dry.  Capillary refill takes less than 2 seconds.   Psychiatric: She has a normal mood and affect. Thought content normal.         ED Course   Procedures  Labs Reviewed   COMPREHENSIVE METABOLIC PANEL - Abnormal; Notable for the following components:       Result Value    Chloride 112 (*)     CO2 22 (*)     Albumin 3.2 (*)     Anion Gap 7 (*)     eGFR if non  54 (*)     All other components within normal limits   CBC W/ AUTO DIFFERENTIAL   URINALYSIS, REFLEX TO URINE CULTURE    Narrative:     Preferred Collection Type->Urine, Clean Catch   POCT URINE PREGNANCY          Imaging Results          CT Renal Stone Study ABD Pelvis WO (Final result)  Result time 05/18/20 16:41:21    Final result by Son Espinal MD (05/18/20 16:41:21)                 Impression:      No renal or ureteral calculi identified.    A few uncomplicated colonic diverticula are identified without evidence for acute diverticulitis.    S/p cholecystectomy.      Electronically signed by: Son Espinal MD  Date:    05/18/2020  Time:    16:41             Narrative:    EXAMINATION:  CT RENAL STONE STUDY ABD PELVIS WO    CLINICAL HISTORY:  Flank pain, stone disease suspected;    TECHNIQUE:  Low dose axial images, sagittal and coronal reformations were obtained from the lung bases to the pubic symphysis.  Contrast was not administered.    COMPARISON:  CT of the abdomen and pelvis with contrast dated 11/06/2018.    FINDINGS:  The lung bases are clear.  The bones are intact.  There is no evidence for acute fracture or bone destruction.  There are degenerative changes within the lumbar spine and lumbosacral junction.    The liver is normal in size and is homogeneous in density with no focal liver lesions identified.  The gallbladder is surgically absent.  There is no evidence for intrahepatic or extrahepatic biliary dilatation.  The spleen, stomach, and pancreas all appear grossly unremarkable.  The adrenal glands are not enlarged.  The kidneys  are normal in size, position, and contour.  There is no evidence for abnormal renal masses or hydronephrosis.  There is no evidence for renal or ureteral calculi.  The abdominal aorta tapers normally without aneurysmal dilatation.  No para-aortic lymphadenopathy is identified.  The appendix is present and appears unremarkable.  There are no dilated loops of bowel evident.  There are a few uncomplicated colonic diverticula present without radiographic evidence for acute diverticulitis.  The uterus is present.  No abnormal adnexal masses are identified.  The urinary bladder is decompressed.  There is no evidence for pelvic or inguinal lymphadenopathy.                                       APC / Resident Notes:   This is an evaluation of a 49 y.o. female that presents to the Emergency Department for Abdominal Pain. Physical Exam shows a non-toxic, afebrile, and obese female.     Vital Signs Are Reassuring. RESULTS:   CBC without leukocytosis, normal H&H.  CMP notable for chloride 112, bicarb 22. UPT negative.  UA without abnormality.  CT abdomen without renal or ureteral calculi, a few uncomplicated colonic diverticula noted without evidence for acute diverticulitis.  Pancreas appears grossly unremarkable.    My overall impression is Abdominal/Flank Pain - Unclear etiology.  Given the benign exam, the laboratory studies, and unremarkable CT, I have a very low suspicion for appendicitis, ischemic bowel, bowel perforation, bowel obstruction,  pancreatitis, UTI, pyelonephritis, kidney stone, diverticulitis, cholecystitis, or any other life threatening disease. Upon reassessment of the patient, she mentions that her nephew jumped on her back a few days ago, therefore pain may be musculoskeletal.      I have discussed with the patient the level of uncertainty with undifferentiated abdominal pain and clearly explained the need to follow-up as noted on the discharge instructions, or return to the Emergency Department  immediately if the pain worsens, develops fever, persistent and uncontrollable vomiting, or for any new symptoms or concerns.    ED Course: Morphine, Zofran. D/C Meds: Ibuprofen. Additional D/C Information: Abdominal Pain Precautions. The diagnosis, treatment plan, instructions for follow-up and reevaluation with her PCP as well as ED return precautions were discussed and understanding was verbalized. All questions or concerns have been addressed.     Of note, patient follows with gastroenterology with an EGD scheduled for 6/18/20.         Scribe Attestation:   Scribe #1: I performed the above scribed service and the documentation accurately describes the services I performed. I attest to the accuracy of the note.                          Clinical Impression:     1. Left flank pain            Disposition:   Disposition: Discharged  Condition: Stable     ED Disposition Condition    Discharge Stable        ED Prescriptions     Medication Sig Dispense Start Date End Date Auth. Provider    ibuprofen (ADVIL,MOTRIN) 800 MG tablet  (Status: Discontinued) Take 1 tablet (800 mg total) by mouth every 6 (six) hours as needed for Pain. 20 tablet 5/18/2020 5/18/2020 Giovanna Redmond PA-C    ibuprofen (ADVIL,MOTRIN) 800 MG tablet Take 1 tablet (800 mg total) by mouth every 6 (six) hours as needed for Pain. 20 tablet 5/18/2020  Giovanna Redmond PA-C        Follow-up Information     Follow up With Specialties Details Why Contact Info    Carlyle Gordillo MD Family Medicine, Wound Care In 2 days  [address] [facility] [address]  [phone]                                I, Giovanna Redmond, personally performed the services described in this documentation. All medical record entries made by the scribe were at my direction and in my presence. I have reviewed the chart and agree that the record reflects my personal performance and is accurate and complete.         Giovanna Redmond PA-C  05/18/20 2049

## 2020-05-18 NOTE — ED TRIAGE NOTES
"Pt presents to ED with c/o left side flank and abd pain  x 1 week. Reports flank pain radiates down back. Reports nausea; denies vomiting. Reports having issues with bowels and took meds this morning for relief. Reports large BM this AM. States " pain feels like when I had gallstones." Denies hx of kidney stones. NAD noted.    "

## 2020-05-19 ENCOUNTER — PATIENT OUTREACH (OUTPATIENT)
Dept: ADMINISTRATIVE | Facility: OTHER | Age: 50
End: 2020-05-19

## 2020-05-19 ENCOUNTER — TELEPHONE (OUTPATIENT)
Dept: FAMILY MEDICINE | Facility: CLINIC | Age: 50
End: 2020-05-19

## 2020-05-19 NOTE — TELEPHONE ENCOUNTER
I spoke to the pt and offered her an appointment with another provider and she declines. Pt scheduled for next available 06/25/2020 and added to the wait list.

## 2020-05-19 NOTE — TELEPHONE ENCOUNTER
----- Message from Flortherese Couch sent at 5/19/2020  1:43 PM CDT -----  Contact: Self [phone]  Type:  Sooner Appointment Request    Patient is requesting a sooner appointment.  Patient declined first available appointment listed as well as another facility and provider .  Patient will not accept being placed on the waitlist and is requesting a message be sent to doctor.    Name of Caller: Self    When is the first available appointment? 6/25    Symptoms: patient is in a lot of pain/Patient went to the ER on yesterday    Would the patient rather a call back or a response via My [facility]? Call back    Best Call Back Number: [phone]

## 2020-05-20 ENCOUNTER — OFFICE VISIT (OUTPATIENT)
Dept: RHEUMATOLOGY | Facility: CLINIC | Age: 50
End: 2020-05-20
Payer: MEDICARE

## 2020-05-20 ENCOUNTER — OFFICE VISIT (OUTPATIENT)
Dept: OPTOMETRY | Facility: CLINIC | Age: 50
End: 2020-05-20
Payer: MEDICARE

## 2020-05-20 VITALS
WEIGHT: 293 LBS | DIASTOLIC BLOOD PRESSURE: 90 MMHG | TEMPERATURE: 98 F | SYSTOLIC BLOOD PRESSURE: 135 MMHG | HEART RATE: 78 BPM | HEIGHT: 64 IN | BODY MASS INDEX: 50.02 KG/M2

## 2020-05-20 DIAGNOSIS — Z01.00 DIABETIC EYE EXAM: Primary | ICD-10-CM

## 2020-05-20 DIAGNOSIS — M79.7 FIBROMYALGIA: ICD-10-CM

## 2020-05-20 DIAGNOSIS — E11.9 DIABETIC EYE EXAM: Primary | ICD-10-CM

## 2020-05-20 DIAGNOSIS — M48.00 SPINAL STENOSIS, UNSPECIFIED SPINAL REGION: ICD-10-CM

## 2020-05-20 DIAGNOSIS — R20.8 SENSORY ABNORMALITY OF THORACIC DERMATOME DISTRIBUTION: ICD-10-CM

## 2020-05-20 DIAGNOSIS — M54.42 CHRONIC MIDLINE LOW BACK PAIN WITH BILATERAL SCIATICA: Primary | ICD-10-CM

## 2020-05-20 DIAGNOSIS — G89.29 CHRONIC MIDLINE LOW BACK PAIN WITH BILATERAL SCIATICA: Primary | ICD-10-CM

## 2020-05-20 DIAGNOSIS — M54.41 CHRONIC MIDLINE LOW BACK PAIN WITH BILATERAL SCIATICA: Primary | ICD-10-CM

## 2020-05-20 DIAGNOSIS — G93.2 IDIOPATHIC INTRACRANIAL HYPERTENSION: ICD-10-CM

## 2020-05-20 DIAGNOSIS — F17.200 CURRENT SMOKER: ICD-10-CM

## 2020-05-20 DIAGNOSIS — H52.7 REFRACTIVE ERROR: ICD-10-CM

## 2020-05-20 PROCEDURE — 92015 PR REFRACTION: ICD-10-PCS | Mod: HCNC,S$GLB,, | Performed by: OPTOMETRIST

## 2020-05-20 PROCEDURE — 99999 PR PBB SHADOW E&M-EST. PATIENT-LVL I: CPT | Mod: PBBFAC,HCNC,, | Performed by: OPTOMETRIST

## 2020-05-20 PROCEDURE — 3075F SYST BP GE 130 - 139MM HG: CPT | Mod: HCNC,CPTII,S$GLB, | Performed by: INTERNAL MEDICINE

## 2020-05-20 PROCEDURE — 99214 OFFICE O/P EST MOD 30 MIN: CPT | Mod: HCNC,S$GLB,, | Performed by: INTERNAL MEDICINE

## 2020-05-20 PROCEDURE — 92014 PR EYE EXAM, EST PATIENT,COMPREHESV: ICD-10-PCS | Mod: HCNC,S$GLB,, | Performed by: OPTOMETRIST

## 2020-05-20 PROCEDURE — 99999 PR PBB SHADOW E&M-EST. PATIENT-LVL IV: ICD-10-PCS | Mod: PBBFAC,HCNC,, | Performed by: INTERNAL MEDICINE

## 2020-05-20 PROCEDURE — 3008F BODY MASS INDEX DOCD: CPT | Mod: HCNC,CPTII,S$GLB, | Performed by: INTERNAL MEDICINE

## 2020-05-20 PROCEDURE — 3008F PR BODY MASS INDEX (BMI) DOCUMENTED: ICD-10-PCS | Mod: HCNC,CPTII,S$GLB, | Performed by: INTERNAL MEDICINE

## 2020-05-20 PROCEDURE — 3075F PR MOST RECENT SYSTOLIC BLOOD PRESS GE 130-139MM HG: ICD-10-PCS | Mod: HCNC,CPTII,S$GLB, | Performed by: INTERNAL MEDICINE

## 2020-05-20 PROCEDURE — 3080F PR MOST RECENT DIASTOLIC BLOOD PRESSURE >= 90 MM HG: ICD-10-PCS | Mod: HCNC,CPTII,S$GLB, | Performed by: INTERNAL MEDICINE

## 2020-05-20 PROCEDURE — 99214 PR OFFICE/OUTPT VISIT, EST, LEVL IV, 30-39 MIN: ICD-10-PCS | Mod: HCNC,S$GLB,, | Performed by: INTERNAL MEDICINE

## 2020-05-20 PROCEDURE — 3080F DIAST BP >= 90 MM HG: CPT | Mod: HCNC,CPTII,S$GLB, | Performed by: INTERNAL MEDICINE

## 2020-05-20 PROCEDURE — 99999 PR PBB SHADOW E&M-EST. PATIENT-LVL I: ICD-10-PCS | Mod: PBBFAC,HCNC,, | Performed by: OPTOMETRIST

## 2020-05-20 PROCEDURE — 92014 COMPRE OPH EXAM EST PT 1/>: CPT | Mod: HCNC,S$GLB,, | Performed by: OPTOMETRIST

## 2020-05-20 PROCEDURE — 99999 PR PBB SHADOW E&M-EST. PATIENT-LVL IV: CPT | Mod: PBBFAC,HCNC,, | Performed by: INTERNAL MEDICINE

## 2020-05-20 PROCEDURE — 92015 DETERMINE REFRACTIVE STATE: CPT | Mod: HCNC,S$GLB,, | Performed by: OPTOMETRIST

## 2020-05-20 NOTE — PROGRESS NOTES
Subjective:       Patient ID: Yanni Kaiser is a 49 y.o. female      Chief Complaint   Patient presents with    Concerns About Ocular Health    Diabetic Eye Exam     History of Present Illness  Dls: 3/26/19 Dr. Bosch     48 y/o female presents today for diabetic eye exam.   Pt c/o blurry vision at distance and near ou x a few months.   Pt wears bifocal glasses.     LBS ?     No tearing  + itching  No burning  No pain  + ha's  No floaters  No flashes    Eye meds  otc gtts ou prn     Hemoglobin A1C       Date                     Value               Ref Range           Status                11/01/2019               5.6                 4.0 - 5.6 %         Final          03/20/2019               6.5 (H)             4.0 - 5.6 %         Final         07/30/2018               5.8 (H)             4.0 - 5.6 %         Final      Assessment/Plan:     1. Diabetic eye exam  [facility] vision    No diabetic retinopathy. Discussed with pt the effects of diabetes on vision, importance of good blood sugar control, compliance with meds, and follow up care with PCP. Return in 1 year for dilated eye exam, sooner PRN.    2. Idiopathic intracranial hypertension  Ocular exam stable. Pt states has not seen neurology in a while and still having headaches, pt advised to schedule for follow up.     3. Refractive error  Refractive shift in Rx, pt advised may be due to BS changes. Educated patient on refractive error and discussed lens options. Dispensed updated spectacle Rx. Educated about adaptation period to new specs.    Eyeglass Final Rx     Eyeglass Final Rx       Sphere Cylinder Add    Right +0.25 Sphere +1.75    Left +0.25 Sphere +1.75    Expiration Date:  5/21/2021                  Follow up in about 1 year (around 5/20/2021) for Diabetic Eye Exam.

## 2020-05-20 NOTE — PROGRESS NOTES
Subjective:       Patient ID: Yanni Kaiser is a 49 y.o. female.    Chief Complaint: muscle aches, arthralgias and pain in her hands (palms) and bottom of feet  HPI   Pt is a 49 year old female with PMH of asthma, DM type 2 with neuropathy, GERD, HLD, HTN, pseudotumor cerebri (2002), seizures (2002), migraines, sleep apnea (not on CPAP) who presented initially for muscle aches in her b/l arms and legs over the last two years associated with numbness and pain (aching and stabbing at times) in her b/l palms and intermittent finger pain as well as pain on the bottom of her feet which has also been going on for the last two years.  Work up in 2018 was revealing for fibromyalgia and lumbar spinal stenosis and moderate neuroforaminal narrowing of L4-L5 on MRI lumbar spine.  Her labs at that time in 2018 showed negative JASON, negative SSA, negative RF/CCP, normal spep/JUNAID, normal cpk, normal aldolase, urine without proteinuria or hematuria.  Pt returns today for acute back pain.      Interval history:  Pt stated that she is having pain on her left mid-back which wraps around to her abdomen and she is having shooting pain as well in middle back.  She can't get comfortable sitting or laying down.  Pt admits to 10/10 pain.  She does admit to shooting pains down her legs as well, she also describes it as a burning sensation on her back around to her stomach, she denies rash.  This started last week and it is getting worse. No falls or trauma to her back.    Pt went to the ED at [facility] on 5/18/2020 and had CT scan showing No renal or ureteral calculi identified. A few uncomplicated colonic diverticula are identified without evidence for acute diverticulitis. S/p cholecystectomy.  UA without blood.    Pt previously saw Dr Johnson for her sleep apnea,     Pt also complaining of all over body pain in her arms and legs with sensitivity to touch.   No swelling of her joints that she has noticed.  Pt has stiffness in  the morning lasting about 2 hours. She admits to her back, knees and feet bothering her.  Pt has not been on cymbalta or lyrica before.      Pt has been seeing Dr Salcedo and has lost about 50 pounds with diet/exercise.    Pt has not done physical therapy for her lower back pain.  [facility], she stopped seeing him at the end of 2019.  She was given an injection in her back but per pt she was told that he would not give her any more injections until she lost weight.  Pt does recall that her back pain improved a little bit with the prior injection.    Initial HPI done 8/8/2018: Pt also admitted to pain in her b/l buttocks as well as lower back associated with shooting pain down her back and legs and knee pain.  The lower back pain has been ongoing for the last 2-3 years associated with sciatica pain at times and she has had incontinence for the last few years as well.  She currently has an MRI lumbar spine pending ordered by her neurologist.   Pt rates the pain a 10/10, with little relief percocet.  Meloxicam and zanaflex prescribed by Dr Lewis helps minimally as well.  Pt admits to head to toe pain.  Pt admitted to swelling of all of the joints over the last 2-3 years with pain, intermittently one or two joints may be swollen.  She admitted to stiffness in her shoulder and neck. Patient admitted to morning stiffness lasting 3-4 hours and stated her pain in general is worse at night.  The stiffness has also been going on for the last 2-3 years.  Pt admitted to feeling food gets stuck in her mid esophagus, pt was informed she has slow moving bowels and she has constipation. Pt sees GI Dr Pickett at [facility]. Pt admitted to intermittent chest pain over the past few months. Pt also admits to NOWAK with her asthma during the hot weather.  She has blurry vision of her left eye and sees double at times associated with pain at times, not associated with a migraine. Pt  also has itchy red eyes which come and go at times. No eye redness or pain currently. Pt is currently on doxycycline for abscess under right breast and sinusitis. Pt admitted to getting about 2-3 hours of sleep a night.  Wakes unrefreshed and feels fatigued. Pt used to take gabapentin for her diabetic neuropathy however she was taken off of the gabapentin a few months ago and admitted the pain is the same.   [facility] management [facility] [place] and she takes percocet.  Pt has been seeing him for the last 6 months.  Pt has not had any injections done at pain management. Pt had dexamethasone injection in her left ankle done by Podiatry Dr Barnes yesterday and previously had her right ankle injected by Dr Perera a podiatrist on the [facility].    Pts last menstrual cycle was about 3 years ago.  She has never had a miscarriage, has 4 children all full term and healthy. Pt is a current smoker for over 20 years, 1/2 ppd. No drinking or drugs.  Pt has no family history of lupus, RA, scleroderma, sarcoidosis, sjogrens, myositis, psoriais, UC or Crohns.    Review of Systems   Constitutional: Positive for fatigue. Negative for chills, fever and unexpected weight change.   HENT: Positive for trouble swallowing. Negative for congestion, facial swelling, hearing loss, mouth sores and nosebleeds.    Eyes: Negative for pain, redness and visual disturbance.   Respiratory: Negative for cough and shortness of breath.    Cardiovascular: Negative for chest pain.   Gastrointestinal: Positive for nausea. Negative for abdominal pain, constipation, diarrhea and vomiting.   Genitourinary: Negative for difficulty urinating, dysuria, genital sores and hematuria.   Musculoskeletal: Positive for arthralgias, back pain, joint swelling, myalgias, neck pain and neck stiffness.   Skin: Negative for color change and rash.   Neurological: Positive for seizures, weakness, numbness and headaches.   Hematological: Does not  "bruise/bleed easily.   Psychiatric/Behavioral: Positive for sleep disturbance. Negative for suicidal ideas. The patient is nervous/anxious.            Objective:   BP (!) 135/90 (BP Location: Right arm, Patient Position: Sitting)   Pulse 78   Temp 97.6 °F (36.4 °C) (Oral)   Ht 5' 4" (1.626 m)   Wt (!) 147.6 kg (325 lb 8 oz)   BMI 55.87 kg/m²      Physical Exam   Constitutional: She is oriented to person, place, and time and well-developed, well-nourished, and in no distress.   Pt in pain when getting on examining table   HENT:   Head: Normocephalic and atraumatic.   Right Ear: External ear normal.   Left Ear: External ear normal.   Mouth/Throat: Oropharynx is clear and moist. No oropharyngeal exudate.   Eyes: Conjunctivae and EOM are normal. Pupils are equal, round, and reactive to light. Right eye exhibits no discharge. Left eye exhibits no discharge. No scleral icterus.   Neck: Neck supple. No tracheal deviation present.   Cardiovascular: Normal rate, regular rhythm and normal heart sounds.  Exam reveals no gallop and no friction rub.    No murmur heard.  Pulmonary/Chest: Effort normal and breath sounds normal. No stridor. She has no wheezes. She has no rales.   Abdominal: Soft. Bowel sounds are normal. She exhibits no distension. There is no tenderness.   Neurological: She is alert and oriented to person, place, and time.   Skin: Skin is warm and dry. No rash noted. No erythema.     Musculoskeletal: She exhibits tenderness. She exhibits no edema.   Cspine + tenderness paraspinal muscles   Tspine + tenderness paraspinal muscles   Lspine  + tenderness paraspinal muscles   Shoulders: tenderness b/l   Elbows: FROM; no synovitis; no tophi or nodules  Wrists: FROM; no synovitis; no tenderness  MCPs: FROM; no synovitis; no metacarpalgia b/l  PIPs:FROM; no synovitis;   DIPs: FROM; no synovitis;   HIPS: FROM  Knees: b/; crepitus,  no synovitis; no instability; tenderness along medial joint line b/l  Ankles: FROM: no " synovitis, +tenderness b/l  Toes: ok; + metatarsalgia b/l     18/18 fibromyalgia tenderpoints    Patient with tenderness of proximal and distal upper and lower extremities at nonspecific areas             Ref. Range 6/19/2018 06:55   WBC Latest Ref Range: 3.90 - 12.70 K/uL 5.56   RBC Latest Ref Range: 4.00 - 5.40 M/uL 4.15   Hemoglobin Latest Ref Range: 12.0 - 16.0 g/dL 11.3 (L)   Hematocrit Latest Ref Range: 37.0 - 48.5 % 35.7 (L)   MCV Latest Ref Range: 82 - 98 fL 86   MCH Latest Ref Range: 27.0 - 31.0 pg 27.2   MCHC Latest Ref Range: 32.0 - 36.0 g/dL 31.7 (L)   RDW Latest Ref Range: 11.5 - 14.5 % 16.8 (H)   Platelets Latest Ref Range: 150 - 350 K/uL 328   MPV Latest Ref Range: 9.2 - 12.9 fL 9.1 (L)   Gran% Latest Ref Range: 38.0 - 73.0 % 39.5   Gran # (ANC) Latest Ref Range: 1.8 - 7.7 K/uL 2.2   Lymph% Latest Ref Range: 18.0 - 48.0 % 40.5   Lymph # Latest Ref Range: 1.0 - 4.8 K/uL 2.3   Mono% Latest Ref Range: 4.0 - 15.0 % 9.4   Mono # Latest Ref Range: 0.3 - 1.0 K/uL 0.5   Eosinophil% Latest Ref Range: 0.0 - 8.0 % 9.7 (H)   Eos # Latest Ref Range: 0.0 - 0.5 K/uL 0.5   Basophil% Latest Ref Range: 0.0 - 1.9 % 0.9   Baso # Latest Ref Range: 0.00 - 0.20 K/uL 0.05      Ref. Range 6/19/2018 06:55   Sodium Latest Ref Range: 136 - 145 mmol/L 142   Potassium Latest Ref Range: 3.5 - 5.1 mmol/L 3.8   Chloride Latest Ref Range: 95 - 110 mmol/L 113 (H)   CO2 Latest Ref Range: 23 - 29 mmol/L 19 (L)   Anion Gap Latest Ref Range: 8 - 16 mmol/L 10   BUN, Bld Latest Ref Range: 6 - 20 mg/dL 17   Creatinine Latest Ref Range: 0.5 - 1.4 mg/dL 1.2   eGFR if non African American Latest Ref Range: >60 mL/min/1.73 m^2 55 (A)   eGFR if African American Latest Ref Range: >60 mL/min/1.73 m^2 >60   Glucose Latest Ref Range: 70 - 110 mg/dL 106   Calcium Latest Ref Range: 8.7 - 10.5 mg/dL 8.9   Alkaline Phosphatase Latest Ref Range: 55 - 135 U/L 70   Total Protein Latest Ref Range: 6.0 - 8.4 g/dL 6.8   Albumin Latest Ref Range: 3.5 - 5.2 g/dL  3.1 (L)   Total Bilirubin Latest Ref Range: 0.1 - 1.0 mg/dL 0.4   AST Latest Ref Range: 10 - 40 U/L 11   ALT Latest Ref Range: 10 - 44 U/L 11     Assessment:       1. Chronic midline low back pain with bilateral sciatica    2. Fibromyalgia    3. Current smoker    4. Spinal stenosis, unspecified spinal region    5. Sensory abnormality of thoracic dermatome distribution        Pt is a 49 year old female with PMH of asthma, DM type 2 with neuropathy, GERD, HLD, HTN, pseudotumor cerebri (2002), seizures (2002), migraines, sleep apnea (not on CPAP) who presented initially for muscle aches in her b/l arms and legs over the last two years associated with numbness and pain (aching and stabbing at times) in her b/l palms and intermittent finger pain as well as pain on the bottom of her feet which has also been going on for the last two years.  Work up in 2018 was revealing for fibromyalgia and lumbar spinal stenosis and moderate neuroforaminal narrowing of L4-L5 on MRI lumbar spine.  Her labs at that time in 2018 showed negative JASON, negative SSA, negative RF/CCP, normal spep/JUNAID, normal cpk, normal aldolase, urine without proteinuria or hematuria.  Pt returns today for acute back pain.      Concern at this time given her acute onset pain is dermatomal and does not cross midline from midthoracic wrapping around to abdomen for possible early shingles given she describes the pain as a burning sensation.      In terms of her chronic lower back pain, she has concerning symptoms for sciatica with shooting pains down her legs, her last MRI was done in 2018. Will obtain further workup.    Exam consistent with fibromyalgia and DDD of spine. No acute synovitis.    Plan:   -obtain labs and urine as below.  -referral to PMR for fibromyalgia  -will send note to PCP out of concern that acute dermatomal pain may be early shingles developing.  - follow up with Dr Salcedo for continued weight loss  -referral to pain management for chronic  lower back pain  -obtain MRI lumbar spine  -pt to RTC in 6 weeks    1. Chronic midline low back pain with bilateral sciatica  - JASON Screen w/Reflex; Future  - Sjogrens syndrome-A extractable nuclear antibody; Future  - CBC auto differential; Standing  - Comprehensive metabolic panel; Standing  - C-Reactive Protein; Standing  - Sedimentation rate; Standing  - Urinalysis; Standing  - Protein / creatinine ratio, urine; Standing  - CK; Standing  - Ambulatory referral/consult to Physical Medicine Rehab; Future  - Ambulatory referral/consult to Pain Clinic; Future  - MRI Lumbar Spine Without Contrast; Future    2. Fibromyalgia  - JASON Screen w/Reflex; Future  - Sjogrens syndrome-A extractable nuclear antibody; Future  - CBC auto differential; Standing  - Comprehensive metabolic panel; Standing  - C-Reactive Protein; Standing  - Sedimentation rate; Standing  - Urinalysis; Standing  - Protein / creatinine ratio, urine; Standing  - CK; Standing  - Ambulatory referral/consult to Physical Medicine Rehab; Future    3. Current smoker    4. Spinal stenosis, unspecified spinal region  - MRI Lumbar Spine Without Contrast; Future    5. Sensory abnormality of thoracic dermatome distribution      Atleast 25 minutes of time was spent face to face with the patient with >50% discussing lab results, lifestyle changes, medications or counseling patient and coordinating care for their disease.  All patients questions answered appropriately and to the patients satisfaction.

## 2020-05-21 ENCOUNTER — TELEPHONE (OUTPATIENT)
Dept: RHEUMATOLOGY | Facility: CLINIC | Age: 50
End: 2020-05-21

## 2020-05-21 ENCOUNTER — HOSPITAL ENCOUNTER (OUTPATIENT)
Dept: RADIOLOGY | Facility: HOSPITAL | Age: 50
Discharge: HOME | End: 2020-05-21
Attending: INTERNAL MEDICINE
Payer: MEDICARE

## 2020-05-21 DIAGNOSIS — M54.42 CHRONIC MIDLINE LOW BACK PAIN WITH BILATERAL SCIATICA: ICD-10-CM

## 2020-05-21 DIAGNOSIS — G89.29 CHRONIC MIDLINE LOW BACK PAIN WITH BILATERAL SCIATICA: ICD-10-CM

## 2020-05-21 DIAGNOSIS — M54.41 CHRONIC MIDLINE LOW BACK PAIN WITH BILATERAL SCIATICA: ICD-10-CM

## 2020-05-21 DIAGNOSIS — M48.00 SPINAL STENOSIS, UNSPECIFIED SPINAL REGION: ICD-10-CM

## 2020-05-21 PROCEDURE — 72148 MRI LUMBAR SPINE W/O DYE: CPT | Mod: TC,HCNC

## 2020-05-21 PROCEDURE — 72148 MRI LUMBAR SPINE WITHOUT CONTRAST: ICD-10-PCS | Mod: 26,HCNC,, | Performed by: RADIOLOGY

## 2020-05-21 PROCEDURE — 72148 MRI LUMBAR SPINE W/O DYE: CPT | Mod: 26,HCNC,, | Performed by: RADIOLOGY

## 2020-05-22 ENCOUNTER — HOSPITAL ENCOUNTER (EMERGENCY)
Facility: HOSPITAL | Age: 50
Discharge: HOME | End: 2020-05-22
Attending: EMERGENCY MEDICINE
Payer: MEDICARE

## 2020-05-22 VITALS
BODY MASS INDEX: 50.02 KG/M2 | RESPIRATION RATE: 15 BRPM | HEART RATE: 59 BPM | SYSTOLIC BLOOD PRESSURE: 163 MMHG | TEMPERATURE: 99 F | HEIGHT: 64 IN | WEIGHT: 293 LBS | OXYGEN SATURATION: 100 % | DIASTOLIC BLOOD PRESSURE: 80 MMHG

## 2020-05-22 DIAGNOSIS — R10.9 RIGHT FLANK PAIN: ICD-10-CM

## 2020-05-22 DIAGNOSIS — R10.9 LEFT FLANK PAIN: ICD-10-CM

## 2020-05-22 DIAGNOSIS — M79.10 MUSCLE PAIN: Primary | ICD-10-CM

## 2020-05-22 LAB
ALBUMIN SERPL BCP-MCNC: 3.2 G/DL (ref 3.5–5.2)
ALP SERPL-CCNC: 59 U/L (ref 55–135)
ALT SERPL W/O P-5'-P-CCNC: 5 U/L (ref 10–44)
ANION GAP SERPL CALC-SCNC: 6 MMOL/L (ref 8–16)
AST SERPL-CCNC: 15 U/L (ref 10–40)
BACTERIA #/AREA URNS HPF: ABNORMAL /HPF
BASOPHILS # BLD AUTO: 0.06 K/UL (ref 0–0.2)
BASOPHILS NFR BLD: 1.5 % (ref 0–1.9)
BILIRUB SERPL-MCNC: 0.3 MG/DL (ref 0.1–1)
BILIRUB UR QL STRIP: NEGATIVE
BUN SERPL-MCNC: 12 MG/DL (ref 6–20)
CALCIUM SERPL-MCNC: 8.5 MG/DL (ref 8.7–10.5)
CHLORIDE SERPL-SCNC: 111 MMOL/L (ref 95–110)
CLARITY UR: CLEAR
CO2 SERPL-SCNC: 22 MMOL/L (ref 23–29)
COLOR UR: YELLOW
CREAT SERPL-MCNC: 1.5 MG/DL (ref 0.5–1.4)
DIFFERENTIAL METHOD: ABNORMAL
EOSINOPHIL # BLD AUTO: 0.4 K/UL (ref 0–0.5)
EOSINOPHIL NFR BLD: 9.3 % (ref 0–8)
ERYTHROCYTE [DISTWIDTH] IN BLOOD BY AUTOMATED COUNT: 14.6 % (ref 11.5–14.5)
EST. GFR  (AFRICAN AMERICAN): 47 ML/MIN/1.73 M^2
EST. GFR  (NON AFRICAN AMERICAN): 41 ML/MIN/1.73 M^2
GLUCOSE SERPL-MCNC: 95 MG/DL (ref 70–110)
GLUCOSE UR QL STRIP: NEGATIVE
HCT VFR BLD AUTO: 39.6 % (ref 37–48.5)
HGB BLD-MCNC: 12.5 G/DL (ref 12–16)
HGB UR QL STRIP: NEGATIVE
HYALINE CASTS #/AREA URNS LPF: 1 /LPF
IMM GRANULOCYTES # BLD AUTO: 0.01 K/UL (ref 0–0.04)
IMM GRANULOCYTES NFR BLD AUTO: 0.2 % (ref 0–0.5)
KETONES UR QL STRIP: NEGATIVE
LEUKOCYTE ESTERASE UR QL STRIP: NEGATIVE
LYMPHOCYTES # BLD AUTO: 1.6 K/UL (ref 1–4.8)
LYMPHOCYTES NFR BLD: 40 % (ref 18–48)
MCH RBC QN AUTO: 30.3 PG (ref 27–31)
MCHC RBC AUTO-ENTMCNC: 31.6 G/DL (ref 32–36)
MCV RBC AUTO: 96 FL (ref 82–98)
MICROSCOPIC COMMENT: ABNORMAL
MONOCYTES # BLD AUTO: 0.5 K/UL (ref 0.3–1)
MONOCYTES NFR BLD: 12 % (ref 4–15)
NEUTROPHILS # BLD AUTO: 1.5 K/UL (ref 1.8–7.7)
NEUTROPHILS NFR BLD: 37 % (ref 38–73)
NITRITE UR QL STRIP: NEGATIVE
NRBC BLD-RTO: 0 /100 WBC
PH UR STRIP: 5 [PH] (ref 5–8)
PLATELET # BLD AUTO: 285 K/UL (ref 150–350)
PMV BLD AUTO: 9.1 FL (ref 9.2–12.9)
POTASSIUM SERPL-SCNC: 4.4 MMOL/L (ref 3.5–5.1)
PROT SERPL-MCNC: 7.1 G/DL (ref 6–8.4)
PROT UR QL STRIP: NEGATIVE
RBC # BLD AUTO: 4.12 M/UL (ref 4–5.4)
RBC #/AREA URNS HPF: 0 /HPF (ref 0–4)
SODIUM SERPL-SCNC: 139 MMOL/L (ref 136–145)
SP GR UR STRIP: >1.03 (ref 1–1.03)
SQUAMOUS #/AREA URNS HPF: 2 /HPF
URN SPEC COLLECT METH UR: ABNORMAL
UROBILINOGEN UR STRIP-ACNC: NEGATIVE EU/DL
WBC # BLD AUTO: 4.07 K/UL (ref 3.9–12.7)
WBC #/AREA URNS HPF: 10 /HPF (ref 0–5)
WBC CASTS #/AREA URNS LPF: 1 /LPF

## 2020-05-22 PROCEDURE — 99284 EMERGENCY DEPT VISIT MOD MDM: CPT | Mod: 25,HCNC

## 2020-05-22 PROCEDURE — 96375 TX/PRO/DX INJ NEW DRUG ADDON: CPT | Mod: HCNC

## 2020-05-22 PROCEDURE — 25000003 PHARM REV CODE 250: Mod: HCNC | Performed by: EMERGENCY MEDICINE

## 2020-05-22 PROCEDURE — 63600175 PHARM REV CODE 636 W HCPCS: Mod: HCNC | Performed by: EMERGENCY MEDICINE

## 2020-05-22 PROCEDURE — 96374 THER/PROPH/DIAG INJ IV PUSH: CPT | Mod: HCNC

## 2020-05-22 PROCEDURE — 93010 EKG 12-LEAD: ICD-10-PCS | Mod: HCNC,,, | Performed by: INTERNAL MEDICINE

## 2020-05-22 PROCEDURE — 93010 ELECTROCARDIOGRAM REPORT: CPT | Mod: HCNC,,, | Performed by: INTERNAL MEDICINE

## 2020-05-22 PROCEDURE — 80053 COMPREHEN METABOLIC PANEL: CPT | Mod: HCNC

## 2020-05-22 PROCEDURE — 85025 COMPLETE CBC W/AUTO DIFF WBC: CPT | Mod: HCNC

## 2020-05-22 PROCEDURE — 93005 ELECTROCARDIOGRAM TRACING: CPT | Mod: HCNC

## 2020-05-22 PROCEDURE — 81000 URINALYSIS NONAUTO W/SCOPE: CPT | Mod: HCNC

## 2020-05-22 RX ORDER — METHOCARBAMOL 500 MG/1
1000 TABLET, FILM COATED ORAL
Status: COMPLETED | OUTPATIENT
Start: 2020-05-22 | End: 2020-05-22

## 2020-05-22 RX ORDER — METHOCARBAMOL 500 MG/1
1000 TABLET, FILM COATED ORAL 3 TIMES DAILY
Qty: 30 TABLET | Refills: 0 | Status: SHIPPED | OUTPATIENT
Start: 2020-05-22 | End: 2020-05-27

## 2020-05-22 RX ORDER — MORPHINE SULFATE 10 MG/ML
6 INJECTION INTRAMUSCULAR; INTRAVENOUS; SUBCUTANEOUS
Status: COMPLETED | OUTPATIENT
Start: 2020-05-22 | End: 2020-05-22

## 2020-05-22 RX ORDER — HYDROCODONE BITARTRATE AND ACETAMINOPHEN 5; 325 MG/1; MG/1
1 TABLET ORAL EVERY 6 HOURS PRN
Qty: 12 TABLET | Refills: 0 | Status: SHIPPED | OUTPATIENT
Start: 2020-05-22 | End: 2020-05-25

## 2020-05-22 RX ORDER — ONDANSETRON 2 MG/ML
8 INJECTION INTRAMUSCULAR; INTRAVENOUS
Status: COMPLETED | OUTPATIENT
Start: 2020-05-22 | End: 2020-05-22

## 2020-05-22 RX ORDER — LIDOCAINE 50 MG/G
1 PATCH TOPICAL
Status: DISCONTINUED | OUTPATIENT
Start: 2020-05-22 | End: 2020-05-22 | Stop reason: HOSPADM

## 2020-05-22 RX ADMIN — ONDANSETRON HYDROCHLORIDE 8 MG: 2 SOLUTION INTRAMUSCULAR; INTRAVENOUS at 11:05

## 2020-05-22 RX ADMIN — METHOCARBAMOL TABLETS 1000 MG: 500 TABLET, COATED ORAL at 09:05

## 2020-05-22 RX ADMIN — MORPHINE SULFATE 6 MG: 10 INJECTION INTRAVENOUS at 09:05

## 2020-05-22 RX ADMIN — SODIUM CHLORIDE 1000 ML: 0.9 INJECTION, SOLUTION INTRAVENOUS at 10:05

## 2020-05-22 RX ADMIN — LIDOCAINE 1 PATCH: 50 PATCH TOPICAL at 12:05

## 2020-05-22 NOTE — ED PROVIDER NOTES
Encounter Date: 5/22/2020    SCRIBE #1 NOTE: I, Shelly Cheney, am scribing for, and in the presence of,  Kacey Clark MD. I have scribed the following portions of the note - Other sections scribed: HPI, ROS, PE, Initial Impression, EKG.       History     Chief Complaint   Patient presents with    Back Pain     left lower back pain that radiates into left abd area.  + nausea. denies vomiting, diarrhea, or fever.   states less frequent urination.    denies painful or burning urination.     This is a 49 y.o. female with a PMHx of DM, Fibromyalgia, and Lumbar Stenosis who presents to the ED complaining of left-sided lower back pain that started 1 week ago. She reports that she came to this ED 4 days ago for similar symptoms and the pain has worsened. She notes having associated nausea with the pain. She states that the pain radiates to her left flank and is worse with movement. She reports that she has been taking Tylenol for treatment. She notes that her 4 y.o. grandson jumped on her back 2 weeks ago and she thinks this may have caused her pain. She reports that she had blood work done 2 days ago by her rheumatologist and was told yesterday that her kidney levels were up. She denies a PMHx of kidney stones, CVA, MI, or CHF. Denies leg swelling, dysuria, emesis, diarrhea, and weakness. No other associated symptoms.    The history is provided by the patient. No  was used.     Review of patient's allergies indicates:  No Known Allergies  Past Medical History:   Diagnosis Date    Allergy     Anemia     Asthma     Back pain     Chronic bronchitis     Cigarette smoker     Depression     Diabetes with neurologic complications     DUB (dysfunctional uterine bleeding) 10/16/2018    GERD (gastroesophageal reflux disease)     High cholesterol     Hyperprolactinemia     Hypertension     Influenza A 01/16/2020    Obese body habitus     Pseudotumor cerebri     Renal manifestation of secondary  diabetes mellitus     Respiratory failure     Seizures     Simple endometrial hyperplasia 2018    Sleep apnea     Smoker     Tobacco dependence      Past Surgical History:   Procedure Laterality Date    BREAST CYST ASPIRATION       SECTION      X 1    CHOLECYSTECTOMY      COLONOSCOPY      COLONOSCOPY N/A 2019    Procedure: COLONOSCOPY;  Surgeon: Jagruti Sweeney MD;  [facility]  Service: Endoscopy;  Laterality: N/A;  BMI is 63/gastroparesis/3 days full liquid diet 1 day clear liquid see telephone encounter by Ciara Pinto [facility]    ESOPHAGEAL MANOMETRY WITH MEASUREMENT OF IMPEDANCE N/A 2019    Procedure: MANOMETRY-ESOPHAGEAL-WITH IMPEDANCE;  Surgeon: Jagruti Sweeney MD;  Location: [facility] (2ND FLR);  Service: Endoscopy;  Laterality: N/A;  Hold Narcotics x 1 days   Hold TCA x 1 days  Propofol only. No fentanyl or benzodiazepine during sedation. If additional sedation needed, discuss with Dr. Sweeney.  10/29 - pt confirmed appt    ESOPHAGOGASTRODUODENOSCOPY N/A 2019    Procedure: EGD (ESOPHAGOGASTRODUODENOSCOPY);  Surgeon: Jagruti Sweeney MD;  Location: [facility] ([address] [place] [address]);  Service: Endoscopy;  Laterality: N/A;  BMI is 63/gastroparesis/3 days full liquid diet 1 day clear liquid see telephone encounter by Ciara Pinto [facility]    ESOPHAGOGASTRODUODENOSCOPY N/A 2019    Procedure: ESOPHAGOGASTRODUODENOSCOPY (EGD);  Surgeon: Jagruti Sweeney MD;  Location: [facility] ([address] [place] [address]);  Service: Endoscopy;  Laterality: N/A;  EGD with EndoFlip /+/- Botox  2nd floor for gastroparesis/BMI 63 **367lbs**  Bariatric Stretcher needed  Manometry probe to be placed endoscopically during EGD procedure  Due to change in protocol, Full liquid diet for 3 days/ 1 day of clear liquids  Hi    FOOT FRACTURE SURGERY Left     HYSTEROSCOPY WITH DILATION AND CURETTAGE OF UTERUS N/A 10/16/2018    KJB    PLANTAR FASCIOTOMY Left 2019    Procedure: FASCIOTOMY, PLANTAR;  Surgeon:  Valentino Orourke DPM;  Location: [facility] OR [address] [place] [address];  Service: Podiatry;  Laterality: Left;    RETINAL LASER PROCEDURE Left     SINUS SURGERY      UPPER GASTROINTESTINAL ENDOSCOPY       Family History   Problem Relation Age of Onset    Hypertension Mother     Diabetes Mother     Colon cancer Mother 50    Colon polyps Mother     Heart disease Father     COPD Father     Heart attack Father     Hypertension Father     Ulcers Father     Cancer Maternal Grandmother     Breast cancer Maternal Grandmother     Colon cancer Maternal Grandmother     Colon polyps Maternal Grandmother     No Known Problems Paternal Grandmother     No Known Problems Brother     No Known Problems Maternal Aunt     No Known Problems Maternal Uncle     No Known Problems Paternal Aunt     No Known Problems Paternal Uncle     No Known Problems Maternal Grandfather     No Known Problems Paternal Grandfather     Celiac disease Neg Hx     Cirrhosis Neg Hx     Crohn's disease Neg Hx     Cystic fibrosis Neg Hx     Esophageal cancer Neg Hx     Hemochromatosis Neg Hx     Inflammatory bowel disease Neg Hx     Irritable bowel syndrome Neg Hx     Liver cancer Neg Hx     Liver disease Neg Hx     Rectal cancer Neg Hx     Stomach cancer Neg Hx     Ulcerative colitis Neg Hx     Jonnie's disease Neg Hx     Tuberculosis Neg Hx     Lymphoma Neg Hx     Scleroderma Neg Hx     Rheum arthritis Neg Hx     Melanoma Neg Hx     Multiple sclerosis Neg Hx     Psoriasis Neg Hx     Lupus Neg Hx     Skin cancer Neg Hx     Amblyopia Neg Hx     Blindness Neg Hx     Cataracts Neg Hx     Glaucoma Neg Hx     Macular degeneration Neg Hx     Retinal detachment Neg Hx     Strabismus Neg Hx     Stroke Neg Hx     Thyroid disease Neg Hx      Social History     Tobacco Use    Smoking status: Current Every Day Smoker     Packs/day: 1.00     Years: 27.00     Pack years: 27.00     Types: Cigarettes     Start date: 1/1/1992    Smokeless  tobacco: Never Used    Tobacco comment: 1/2 a pack if her days are good   Substance Use Topics    Alcohol use: Yes     Alcohol/week: 0.0 standard drinks     Frequency: Never     Comment: socially    Drug use: No     Review of Systems   Constitutional: Negative for fever.   HENT: Negative for sore throat.    Respiratory: Negative for shortness of breath.    Cardiovascular: Negative for chest pain and leg swelling.   Gastrointestinal: Positive for nausea. Negative for diarrhea and vomiting.   Genitourinary: Positive for flank pain. Negative for dysuria.   Musculoskeletal: Positive for back pain.   Skin: Negative for rash.   Neurological: Negative for weakness.   Hematological: Does not bruise/bleed easily.       Physical Exam     Initial Vitals [05/22/20 0827]   BP Pulse Resp Temp SpO2   138/88 62 18 98.7 °F (37.1 °C) 99 %      MAP       --         Physical Exam    Constitutional: She appears well-developed and well-nourished. She is Obese . She does not appear ill.   HENT:   Head: Normocephalic.   Eyes: Conjunctivae are normal. No scleral icterus.   Neck: Normal range of motion. Neck supple.   Cardiovascular: Normal heart sounds and intact distal pulses.   No murmur heard.  Pulmonary/Chest: Breath sounds normal. No respiratory distress.   Abdominal: Soft. Bowel sounds are normal. She exhibits no distension.   Musculoskeletal: Normal range of motion. She exhibits tenderness. She exhibits no edema.   Tenderness on palpation to the paraspinal musculature on the left side of the mid to lower thoracic spine.  The tenderness reaches over to the mid axillary line and soreness that reaches around to the upper abdomen.   Neurological: She is alert.   Skin: Skin is warm and dry.   Psychiatric: She has a normal mood and affect. Her behavior is normal. Judgment and thought content normal.         ED Course   Procedures  Labs Reviewed   CBC W/ AUTO DIFFERENTIAL - Abnormal; Notable for the following components:       Result  Value    Mean Corpuscular Hemoglobin Conc 31.6 (*)     RDW 14.6 (*)     MPV 9.1 (*)     Gran # (ANC) 1.5 (*)     Gran% 37.0 (*)     Eosinophil% 9.3 (*)     All other components within normal limits   COMPREHENSIVE METABOLIC PANEL - Abnormal; Notable for the following components:    Chloride 111 (*)     CO2 22 (*)     Creatinine 1.5 (*)     Calcium 8.5 (*)     Albumin 3.2 (*)     ALT 5 (*)     Anion Gap 6 (*)     eGFR if  47 (*)     eGFR if non  41 (*)     All other components within normal limits   URINALYSIS, REFLEX TO URINE CULTURE - Abnormal; Notable for the following components:    Specific Gravity, UA >1.030 (*)     All other components within normal limits    Narrative:     Preferred Collection Type->Urine, Clean Catch   URINALYSIS MICROSCOPIC - Abnormal; Notable for the following components:    WBC, UA 10 (*)     Wbc Casts, Ua 1 (*)     All other components within normal limits    Narrative:     Preferred Collection Type->Urine, Clean Catch     EKG Readings: (Independently Interpreted)   Rhythm: Sinus Bradycardia. Heart Rate: 59.   No ST elevation or depression. Normal QT interval. Similar to previous EKG.     ECG Results          EKG 12-lead (In process)  Result time 05/22/20 12:54:57    In process by Interface, Lab In [facility] (05/22/20 12:54:57)                 Narrative:    Test Reason : R10.9,    Vent. Rate : 059 BPM     Atrial Rate : 059 BPM     P-R Int : 164 ms          QRS Dur : 084 ms      QT Int : 434 ms       P-R-T Axes : 050 051 028 degrees     QTc Int : 429 ms    Sinus bradycardia  Otherwise normal ECG  When compared with ECG of 16-JAN-2020 12:54,  Significant changes have occurred    Referred By: [email or web address]   SELF           Confirmed By:                   In process by Interface, Lab In [facility] (05/22/20 12:53:58)                 Narrative:    Test Reason : R10.9,    Vent. Rate : 059 BPM     Atrial Rate : 059 BPM     P-R Int : 164 ms          QRS Dur : 084 ms       QT Int : 434 ms       P-R-T Axes : 050 051 028 degrees     QTc Int : 429 ms    Sinus bradycardia  Otherwise normal ECG  When compared with ECG of 22-MAY-2020 09:20,  No significant change was found    Referred By: [email or web address]   SELF           Confirmed By:                             Imaging Results          US Retroperitoneal Complete (Final result)  Result time 05/22/20 11:58:30   Procedure changed from US Abdomen Pelvis Doppler Study Limited (retroperitoneal)     Final result by Anya Hays MD (05/22/20 11:58:30)                 Impression:      No significant abnormality.      Electronically signed by: Anya Hays MD  Date:    05/22/2020  Time:    11:58             Narrative:    EXAMINATION:  US RETROPERITONEAL COMPLETE    CLINICAL HISTORY:  pain; Unspecified abdominal pain    TECHNIQUE:  Ultrasound of the kidneys and urinary bladder was performed including color flow and Doppler evaluation of the kidneys.    COMPARISON:  None.    FINDINGS:  Right kidney: The right kidney measures 11.4 cm. No cortical thinning. No loss of corticomedullary distinction. Resistive index measures 0.6.  No mass. No renal stone. No hydronephrosis.    Left kidney: The left kidney measures 11.7 cm. No cortical thinning. No loss of corticomedullary distinction. Resistive index measures 0.6.  No mass. No renal stone. No hydronephrosis.    The bladder is partially distended at the time of scanning and has an unremarkable appearance.                                 Medical Decision Making:   Initial Assessment:   This is a 49 y.o. female with a PMHx of DM, Fibromyalgia, and Lumbar Stenosis who presents to the ED c/o left lower back pain that started 1 week ago.  Clinical Tests:   Lab Tests: Ordered and Reviewed  Medical Tests: Ordered and Reviewed    Differential includes DUSTIN, renal failure, infection, UTI, musculoskeletal pain.  ACS is considered but clinical picture is more consistent with muscular pain, and EKG is within normal  limits.  PE also considered but patient's vitals and history also not suggestive.  Labs today repeated and show an improvement in her creatinine.  Fluids ordered and administered.  Ultrasound of her kidneys are normal today.  Have discussed the findings with the patient today and have arranged for a follow-up appointment to repeat labs and check on her symptoms.  I have treated her pain which is not resolved but has greatly improved.  Patient able to move around the bed a little more comfortably at this time.  I do believe that her pain is more likely related to muscular spasm but have stressed the importance of follow-up for her kidneys.  Patient understands.  She will follow up on Monday at 11:20 a.m. with her PCPs office.  In the meantime if she gets worse has trouble urinating, has fevers chills nausea vomiting or any other concerns she is to return to the ER immediately.              Scribe Attestation:   Scribe #1: I performed the above scribed service and the documentation accurately describes the services I performed. I attest to the accuracy of the note.                          Clinical Impression:       ICD-10-CM ICD-9-CM   1. Muscle pain M79.10 729.1   2. Left flank pain R10.9 789.09   3. Right flank pain R10.9 789.09             ED Disposition Condition    Discharge Stable        ED Prescriptions     Medication Sig Dispense Start Date End Date Auth. Provider    HYDROcodone-acetaminophen (NORCO) 5-325 mg per tablet Take 1 tablet by mouth every 6 (six) hours as needed for Pain. 12 tablet 5/22/2020 5/25/2020 Kacey Clark MD    methocarbamoL (ROBAXIN) 500 MG Tab Take 2 tablets (1,000 mg total) by mouth 3 (three) times daily. for 5 days 30 tablet 5/22/2020 5/27/2020 Kacey Clark MD        Follow-up Information     Follow up With Specialties Details Why Contact Info    Carlyle Gordillo MD Family Medicine, Wound Care  follow up as arranged [address] [place] [address]  Swati DENNIS [address] [phone]       [facility] Emergency Medicine Go to  As needed, if symptoms worsen, or any other concerns [address] [place] [address] [phone]                      Scribe attestation: I, Kacey Clark, personally performed the services described in this documentation. All medical record entries made by the scribe were at my direction and in my presence.  I have reviewed the chart and agree that the record reflects my personal performance and is accurate and complete.                 Kacey Clark MD  05/22/20 [id number]

## 2020-05-22 NOTE — ED TRIAGE NOTES
Patient reports that she started to have left lower back pain, that radiates to the lower abdomen. that started last week. Was seen in ER on Monday without relief.

## 2020-05-25 ENCOUNTER — OFFICE VISIT (OUTPATIENT)
Dept: FAMILY MEDICINE | Facility: CLINIC | Age: 50
End: 2020-05-25
Attending: INTERNAL MEDICINE
Payer: MEDICARE

## 2020-05-25 ENCOUNTER — PATIENT MESSAGE (OUTPATIENT)
Dept: FAMILY MEDICINE | Facility: CLINIC | Age: 50
End: 2020-05-25

## 2020-05-25 ENCOUNTER — LAB VISIT (OUTPATIENT)
Dept: LAB | Facility: HOSPITAL | Age: 50
End: 2020-05-25
Attending: NURSE PRACTITIONER
Payer: MEDICARE

## 2020-05-25 VITALS
DIASTOLIC BLOOD PRESSURE: 85 MMHG | BODY MASS INDEX: 50.02 KG/M2 | HEIGHT: 64 IN | HEART RATE: 65 BPM | TEMPERATURE: 98 F | RESPIRATION RATE: 16 BRPM | WEIGHT: 293 LBS | OXYGEN SATURATION: 98 % | SYSTOLIC BLOOD PRESSURE: 126 MMHG

## 2020-05-25 DIAGNOSIS — N18.30 STAGE 3 CHRONIC KIDNEY DISEASE: Chronic | ICD-10-CM

## 2020-05-25 DIAGNOSIS — R10.9 LEFT FLANK PAIN: ICD-10-CM

## 2020-05-25 DIAGNOSIS — B02.9 HERPES ZOSTER WITHOUT COMPLICATION: Primary | ICD-10-CM

## 2020-05-25 LAB
ANION GAP SERPL CALC-SCNC: 7 MMOL/L (ref 8–16)
BUN SERPL-MCNC: 10 MG/DL (ref 6–20)
CALCIUM SERPL-MCNC: 9 MG/DL (ref 8.7–10.5)
CHLORIDE SERPL-SCNC: 109 MMOL/L (ref 95–110)
CO2 SERPL-SCNC: 24 MMOL/L (ref 23–29)
CREAT SERPL-MCNC: 1.3 MG/DL (ref 0.5–1.4)
EST. GFR  (AFRICAN AMERICAN): 56 ML/MIN/1.73 M^2
EST. GFR  (NON AFRICAN AMERICAN): 49 ML/MIN/1.73 M^2
GLUCOSE SERPL-MCNC: 80 MG/DL (ref 70–110)
POTASSIUM SERPL-SCNC: 4.4 MMOL/L (ref 3.5–5.1)
SODIUM SERPL-SCNC: 140 MMOL/L (ref 136–145)

## 2020-05-25 PROCEDURE — 80048 BASIC METABOLIC PNL TOTAL CA: CPT | Mod: HCNC

## 2020-05-25 PROCEDURE — 99999 PR PBB SHADOW E&M-EST. PATIENT-LVL IV: CPT | Mod: PBBFAC,HCNC,, | Performed by: NURSE PRACTITIONER

## 2020-05-25 PROCEDURE — 3079F DIAST BP 80-89 MM HG: CPT | Mod: HCNC,CPTII,S$GLB, | Performed by: NURSE PRACTITIONER

## 2020-05-25 PROCEDURE — 3079F PR MOST RECENT DIASTOLIC BLOOD PRESSURE 80-89 MM HG: ICD-10-PCS | Mod: HCNC,CPTII,S$GLB, | Performed by: NURSE PRACTITIONER

## 2020-05-25 PROCEDURE — 99999 PR PBB SHADOW E&M-EST. PATIENT-LVL IV: ICD-10-PCS | Mod: PBBFAC,HCNC,, | Performed by: NURSE PRACTITIONER

## 2020-05-25 PROCEDURE — 99214 OFFICE O/P EST MOD 30 MIN: CPT | Mod: HCNC,S$GLB,, | Performed by: NURSE PRACTITIONER

## 2020-05-25 PROCEDURE — 3074F SYST BP LT 130 MM HG: CPT | Mod: HCNC,CPTII,S$GLB, | Performed by: NURSE PRACTITIONER

## 2020-05-25 PROCEDURE — 3074F PR MOST RECENT SYSTOLIC BLOOD PRESSURE < 130 MM HG: ICD-10-PCS | Mod: HCNC,CPTII,S$GLB, | Performed by: NURSE PRACTITIONER

## 2020-05-25 PROCEDURE — 3008F PR BODY MASS INDEX (BMI) DOCUMENTED: ICD-10-PCS | Mod: HCNC,CPTII,S$GLB, | Performed by: NURSE PRACTITIONER

## 2020-05-25 PROCEDURE — 99214 PR OFFICE/OUTPT VISIT, EST, LEVL IV, 30-39 MIN: ICD-10-PCS | Mod: HCNC,S$GLB,, | Performed by: NURSE PRACTITIONER

## 2020-05-25 PROCEDURE — 36415 COLL VENOUS BLD VENIPUNCTURE: CPT | Mod: HCNC,PN

## 2020-05-25 PROCEDURE — 3008F BODY MASS INDEX DOCD: CPT | Mod: HCNC,CPTII,S$GLB, | Performed by: NURSE PRACTITIONER

## 2020-05-25 RX ORDER — GABAPENTIN 300 MG/1
300 CAPSULE ORAL 3 TIMES DAILY
Qty: 30 CAPSULE | Refills: 0 | Status: SHIPPED | OUTPATIENT
Start: 2020-05-25 | End: 2020-06-12

## 2020-05-25 RX ORDER — VALACYCLOVIR HYDROCHLORIDE 1 G/1
1000 TABLET, FILM COATED ORAL 3 TIMES DAILY
Qty: 21 TABLET | Refills: 0 | Status: SHIPPED | OUTPATIENT
Start: 2020-05-25 | End: 2020-06-12

## 2020-05-25 NOTE — PATIENT INSTRUCTIONS
Shingles (Herpes Zoster)     Talk to your healthcare provider about the shingles vaccine.     Shingles is also called herpes zoster. It is a painful skin rash caused by the herpes zoster virus. This is the same virus that causes chickenpox. After a person has chickenpox, the virus remains inactive in the nerve cells. Years later, the virus can become active again and travel to the skin. Most people have shingles only once, but it is possible to have it more than once.  What are the risk factors for shingles?  Anyone who has ever had chickenpox can develop shingles. But your risk is greater if you:  · Are 50 years of age or older  · Have an illness that weakens your immune system, such as HIV/AIDS  · Have cancer, especially Hodgkin disease or lymphoma  · Take medicines that weaken your immune system  What are the symptoms of shingles?  · The first sign of shingles is usually pain, burning, tingling, or itching on one part of your face or body. You may also feel as if you have the flu, with fever and chills.  · A red rash with small blisters appears within a few days. The rash may appear as follows:   ¨ The blisters can occur anywhere, but theyre most common on the back, chest, or abdomen.  ¨ They usually appear on only one side of the body, spreading along the nerve pathway where the virus was inactive.   ¨ The rash can also form around an eye, along one side of the face or neck, or in the mouth.  ¨ In a few people, usually those with weakened immune systems, shingles appear on more than one part of the body at once.  · After a few days, the blisters become dry and form a crust. The crust falls off in days to weeks. The blisters generally do not leave scars.  How is shingles treated?  For most people, shingles heals on its own in a few weeks. But treatment is recommended to help relieve pain, speed healing, and reduce the risk of complications. Antiviral medicines are prescribed within the first 72 hours of the  appearance of the rash. To lessen symptoms:  · Apply ice packs (wrapped in a thin towel) or cool compresses, or soak in a cool bath.  · Use calamine lotion to calm itchy skin.  · Ask your healthcare provider about over-the-counter pain relievers. If your pain is severe, your healthcare provider may prescribe stronger pain medicines.  What are the complications of shingles?  Shingles often goes away with no lasting effects. But some people have serious problems long after the blisters have healed:  · Postherpetic neuralgia. This is the most common complication. It is severe nerve pain at the place where the rash used to be. It can last for months, or even years after you have had shingles. Medicines can be prescribed to help relieve the pain and improve quality of life.  · Bacterial infection. Shingles blisters may become infected with bacteria. Antibiotic medicine is used to treat the infection.  · Eye problems. A person with shingles on the face should see his or her healthcare provider right away. Shingles can cause serious problems with vision, and even blindness.  Very rarely shingles can also lead to pneumonia, hearing problems, brain inflammation, or even death.   When to seek medical care  Contact your healthcare provider if you experience any of the following:  · Symptoms that dont go away with treatment  · A rash or blisters near your eye  · Increased drainage, fever, or rash after treatment, or severe pain that doesnt go away   How can shingles be prevented?  You can only get shingles if you have had chicken pox in the past. Those who have never had chickenpox can get the virus from you. Although instead of developing shingles, the person may get chickenpox. Until your blisters form scabs, avoid contact with others, especially the following:  · Pregnant women who have never had chickenpox or the vaccine  · Infants who were born early (prematurely) or who had low weight at birth  · People with weak immune  system (for example, people receiving chemotherapy for cancer, people who have had organ transplants, or people with HIV infections)     The shingles vaccine  If youre 60 years of age or older, ask your healthcare provider if you should receive the shingles vaccine. The vaccine makes it less likely that you will develop shingles. If you do develop shingles, your symptoms will likely be milder than if you hadnt been vaccinated. Note: Although the vaccine is licensed for people 50 years of age or older, the CDC does not recommend the vaccine for those who are 50 to 59 years old.   Date Last Reviewed: 10/1/2016  © 0975-4684 [employer]. [address] [place] [address], [place] [address]. All rights reserved. This information is not intended as a substitute for professional medical care. Always follow your healthcare professional's instructions.

## 2020-05-25 NOTE — PROGRESS NOTES
Routine Office Visit    Patient Name: Yanni Kaiser    : 1970  MRN: [id number]    Chief Complaint:  Back/side pain    Subjective:  Yanni is a 49 y.o. female who presents today for:    1.  Back/side pain - patient reports today for left-sided mid back/side pain the last 2 weeks.  She endorses this pain without any specific inciting event or injury.  She has been seen multiple times in the last week for this pain.  Urinalysis showed no UTI.  Retroperitoneal ultrasound showed no significant abnormality.  CT renal stone negative for acute processes.    She has tried multiple medications for this pain, including Tylenol as well as muscle relaxers.  She has also taken Percocet and was given morphine for the pain in the emergency room but she states that nothing has helped.  She reports having chronic kidney disease stage 3, but in the last week she did have an acute decrease in her GFR and increasing creatinine level.  She was prompted to go seek evaluation by her rheumatologist so she went to the emergency room where her kidney function had shown some improvement.  She was given IV fluids for this problem and was told to follow-up with her PCP.  She states that she has been drinking plenty of water at home as well.    She denies any numbness, tingling, or weakness of the bilateral upper or lower extremities.  She denies any urinary symptoms, groin paresthesias, or recent weight loss.    Of note, she states that in the last 2 days she has developed a red painful rash to the area where she has had the back/side pain.  She has not put anything on the rash.    Past Medical History  Past Medical History:   Diagnosis Date    Allergy     Anemia     Asthma     Back pain     Chronic bronchitis     Cigarette smoker     Depression     Diabetes with neurologic complications     DUB (dysfunctional uterine bleeding) 10/16/2018    GERD (gastroesophageal reflux disease)     High cholesterol      Hyperprolactinemia     Hypertension     Influenza A 2020    Obese body habitus     Pseudotumor cerebri     Renal manifestation of secondary diabetes mellitus     Respiratory failure     Seizures     Simple endometrial hyperplasia 2018    Sleep apnea     Smoker     Tobacco dependence        Past Surgical History  Past Surgical History:   Procedure Laterality Date    BREAST CYST ASPIRATION       SECTION      X 1    CHOLECYSTECTOMY      COLONOSCOPY      COLONOSCOPY N/A 2019    Procedure: COLONOSCOPY;  Surgeon: Jagruti Sweeney MD;  Location: [facility] (2ND FLR);  Service: Endoscopy;  Laterality: N/A;  BMI is 63/gastroparesis/3 days full liquid diet 1 day clear liquid see telephone encounter by Ciara Millie [facility]    ESOPHAGEAL MANOMETRY WITH MEASUREMENT OF IMPEDANCE N/A 2019    Procedure: MANOMETRY-ESOPHAGEAL-WITH IMPEDANCE;  Surgeon: Jagruti Sweeney MD;  Location: [facility] (2ND FLR);  Service: Endoscopy;  Laterality: N/A;  Hold Narcotics x 1 days   Hold TCA x 1 days  Propofol only. No fentanyl or benzodiazepine during sedation. If additional sedation needed, discuss with Dr. Sweeney.  10/29 - pt confirmed appt    ESOPHAGOGASTRODUODENOSCOPY N/A 2019    Procedure: EGD (ESOPHAGOGASTRODUODENOSCOPY);  Surgeon: Jagruti Sweeney MD;  Location: [facility] ([address] [place] [address]);  Service: Endoscopy;  Laterality: N/A;  BMI is 63/gastroparesis/3 days full liquid diet 1 day clear liquid see telephone encounter by Ciara Brown     ESOPHAGOGASTRODUODENOSCOPY N/A 2019    Procedure: ESOPHAGOGASTRODUODENOSCOPY (EGD);  Surgeon: Jagruti Sweeney MD;  Location: [facility] (2ND FLR);  Service: Endoscopy;  Laterality: N/A;  EGD with EndoFlip /+/- Botox  2nd floor for gastroparesis/BMI 63 **367lbs**  Bariatric Stretcher needed  Manometry probe to be placed endoscopically during EGD procedure  Due to change in protocol, Full liquid diet for 3 days/ 1 day of clear liquids  Hi    FOOT FRACTURE  SURGERY Left     HYSTEROSCOPY WITH DILATION AND CURETTAGE OF UTERUS N/A 10/16/2018    K[facility]    PLANTAR FASCIOTOMY Left 11/18/2019    Procedure: FASCIOTOMY, PLANTAR;  Surgeon: Valentino Orourke DPM;  Location: [facility] OR [address] [place] [address];  Service: Podiatry;  Laterality: Left;    RETINAL LASER PROCEDURE Left     SINUS SURGERY      UPPER GASTROINTESTINAL ENDOSCOPY         Family History  Family History   Problem Relation Age of Onset    Hypertension Mother     Diabetes Mother     Colon cancer Mother 50    Colon polyps Mother     Heart disease Father     COPD Father     Heart attack Father     Hypertension Father     Ulcers Father     Cancer Maternal Grandmother     Breast cancer Maternal Grandmother     Colon cancer Maternal Grandmother     Colon polyps Maternal Grandmother     No Known Problems Paternal Grandmother     No Known Problems Brother     No Known Problems Maternal Aunt     No Known Problems Maternal Uncle     No Known Problems Paternal Aunt     No Known Problems Paternal Uncle     No Known Problems Maternal Grandfather     No Known Problems Paternal Grandfather     Celiac disease Neg Hx     Cirrhosis Neg Hx     Crohn's disease Neg Hx     Cystic fibrosis Neg Hx     Esophageal cancer Neg Hx     Hemochromatosis Neg Hx     Inflammatory bowel disease Neg Hx     Irritable bowel syndrome Neg Hx     Liver cancer Neg Hx     Liver disease Neg Hx     Rectal cancer Neg Hx     Stomach cancer Neg Hx     Ulcerative colitis Neg Hx     Jonnie's disease Neg Hx     Tuberculosis Neg Hx     Lymphoma Neg Hx     Scleroderma Neg Hx     Rheum arthritis Neg Hx     Melanoma Neg Hx     Multiple sclerosis Neg Hx     Psoriasis Neg Hx     Lupus Neg Hx     Skin cancer Neg Hx     Amblyopia Neg Hx     Blindness Neg Hx     Cataracts Neg Hx     Glaucoma Neg Hx     Macular degeneration Neg Hx     Retinal detachment Neg Hx     Strabismus Neg Hx     Stroke Neg Hx     Thyroid disease Neg Hx         Social History  Social History     Socioeconomic History    Marital status:      Spouse name: Not on file    Number of children: Not on file    Years of education: Not on file    Highest education level: Not on file   Occupational History    Not on file   Social Needs    Financial resource strain: Very hard    Food insecurity:     Worry: Often true     Inability: Often true    Transportation needs:     Medical: No     Non-medical: No   Tobacco Use    Smoking status: Current Every Day Smoker     Packs/day: 1.00     Years: 27.00     Pack years: 27.00     Types: Cigarettes     Start date: 1/1/1992    Smokeless tobacco: Never Used    Tobacco comment: 1/2 a pack if her days are good   Substance and Sexual Activity    Alcohol use: Yes     Alcohol/week: 0.0 standard drinks     Frequency: Never     Comment: socially    Drug use: No    Sexual activity: Yes     Partners: Male     Birth control/protection: None   Lifestyle    Physical activity:     Days per week: Not on file     Minutes per session: Not on file    Stress: Not on file   Relationships    Social connections:     Talks on phone: More than three times a week     Gets together: Three times a week     Attends [employer] service: More than 4 times per year     Active member of club or organization: Yes     Attends meetings of clubs or organizations: More than 4 times per year     Relationship status:    Other Topics Concern    Not on file   Social History Narrative    Not on file       Current Medications  Current Outpatient Medications on File Prior to Visit   Medication Sig Dispense Refill    acetaZOLAMIDE (DIAMOX) 500 mg CpSR Take 1 capsule (500 mg total) by mouth 2 (two) times daily. 360 capsule 1    albuterol (PROVENTIL/VENTOLIN HFA) 90 mcg/actuation inhaler Inhale 2 puffs into the lungs every 6 (six) hours as needed. Rescue 54 g 0    blood sugar diagnostic Strp 1 each by Misc.(Non-Drug; Combo Route) route 4 (four) times  daily before meals and nightly. ACCU CHEK ELVIA PLUS METER 200 each 3    blood-glucose meter (ACCU-CHEK ELVIA PLUS METER) Misc TEST FOUR TIMES DAILY BEFORE MEALS AND EVERY NIGHT 90 each 1    desloratadine (CLARINEX) 5 mg tablet Take 1 tablet (5 mg total) by mouth once daily. 30 tablet 11    fluticasone propionate (FLONASE) 50 mcg/actuation nasal spray 1 spray (50 mcg total) by Each Nare route once daily. 15.8 mL 2    lactulose (CHRONULAC) 10 gram/15 mL solution TAKE 15 MLS BY MOUTH THREE TIMES DAILY AS NEEDED 473 mL 6    lancets (ACCU-CHEK SOFTCLIX LANCETS) Misc 1 Device by Misc.(Non-Drug; Combo Route) route 4 (four) times daily. 200 each 3    losartan (COZAAR) 100 MG tablet TAKE 1 TABLET(100 MG) BY MOUTH EVERY DAY 90 tablet 0    metFORMIN (GLUCOPHAGE-XR) 500 MG XR 24hr tablet TAKE 2 TABLETS(1000 MG) BY MOUTH TWICE DAILY WITH MEALS 360 tablet 0    methocarbamoL (ROBAXIN) 500 MG Tab Take 2 tablets (1,000 mg total) by mouth 3 (three) times daily. for 5 days 30 tablet 0    ondansetron (ZOFRAN) 8 MG tablet Take 1 tablet (8 mg total) by mouth every 8 (eight) hours as needed for Nausea. 90 tablet 5    oxyCODONE-acetaminophen (PERCOCET) 7.5-325 mg per tablet Take 1 tablet by mouth every 12 (twelve) hours as needed for Pain. 20 tablet 0    pantoprazole (PROTONIX) 40 MG tablet Take 40 mg by mouth once daily.   3    prucalopride 2 mg Tab Take 1 tablet by mouth once daily. 90 tablet 3    QUEtiapine (SEROQUEL) 25 MG Tab Take 1 tablet (25 mg total) by mouth once daily. 90 tablet 0    semaglutide (OZEMPIC) 1 mg/dose (2 mg/1.5 mL) PnIj Inject 1 mg subq weekly. 9 mL 1    sumatriptan (IMITREX) 50 MG tablet Take 1 tab at onset of headaches.  If no improvement in 2 hours, take another.  Do not take more than 2 tabs in 24 hours. 9 tablet 5    topiramate (TOPAMAX) 100 MG tablet TAKE 3 TABLETS BY MOUTH TWICE DAILY 180 tablet 11    venlafaxine (EFFEXOR-XR) 75 MG 24 hr capsule TAKE 1 CAPSULE(75 MG) BY MOUTH EVERY EVENING  90 capsule 3    albuterol-ipratropium (DUO-NEB) 2.5 mg-0.5 mg/3 mL nebulizer solution Take 3 mLs by nebulization every 6 (six) hours as needed for Wheezing or Shortness of Breath. Rescue 100 mL 3    atorvastatin (LIPITOR) 40 MG tablet Take 1 tablet (40 mg total) by mouth once daily. (Patient not taking: Reported on 5/25/2020) 90 tablet 3    budesonide-formoterol 160-4.5 mcg (SYMBICORT) 160-4.5 mcg/actuation HFAA Inhale 2 puffs into the lungs every 12 (twelve) hours. Controller 1 Inhaler 5    diclofenac sodium (VOLTAREN) 1 % Gel Apply 2 g topically once daily. (Patient not taking: Reported on 5/25/2020) 100 g 3    HYDROcodone-acetaminophen (NORCO) 5-325 mg per tablet Take 1 tablet by mouth every 6 (six) hours as needed for Pain. (Patient not taking: Reported on 5/25/2020) 12 tablet 0    ibuprofen (ADVIL,MOTRIN) 800 MG tablet Take 1 tablet (800 mg total) by mouth every 6 (six) hours as needed for Pain. (Patient not taking: Reported on 5/25/2020) 20 tablet 0     No current facility-administered medications on file prior to visit.        Allergies   Review of patient's allergies indicates:  No Known Allergies    Review of Systems (Pertinent positives)  Review of Systems   Constitutional: Negative for chills, diaphoresis, fever, malaise/fatigue and weight loss.   HENT: Negative.    Eyes: Negative.    Respiratory: Negative for cough, hemoptysis, sputum production, shortness of breath and wheezing.    Cardiovascular: Negative for chest pain, palpitations, orthopnea, claudication, leg swelling and PND.   Gastrointestinal: Negative.    Genitourinary: Negative.    Musculoskeletal: Positive for back pain and myalgias. Negative for falls, joint pain and neck pain.   Skin: Positive for itching and rash.   Neurological: Negative.    Endo/Heme/Allergies: Negative.    Psychiatric/Behavioral: Negative.        /85 (BP Location: Left arm, Patient Position: Sitting, BP Method: Large (Manual))   Pulse 65   Temp 98.4 °F  "(36.9 °C)   Resp 16   Ht 5' 4" (1.626 m)   Wt (!) 149.2 kg (328 lb 14.8 oz)   SpO2 98%   BMI 56.46 kg/m²     Physical Exam   Constitutional: She is oriented to person, place, and time. She appears well-developed and well-nourished.  Non-toxic appearance. She does not have a sickly appearance. She does not appear ill. No distress.   Cardiovascular: Normal rate, regular rhythm, normal heart sounds and intact distal pulses. Exam reveals no gallop and no friction rub.   No murmur heard.  Pulmonary/Chest: Effort normal and breath sounds normal. No stridor. No respiratory distress. She has no wheezes. She has no rales. She exhibits tenderness.       Abdominal: Soft. Bowel sounds are normal. She exhibits no distension and no mass. There is no tenderness. There is no rebound and no guarding. No hernia.   Musculoskeletal:        Thoracic back: Normal. She exhibits normal range of motion, no tenderness, no bony tenderness, no swelling and no edema.        Back:    Neurological: She is alert and oriented to person, place, and time.   Skin: Skin is warm and dry. Capillary refill takes less than 2 seconds. She is not diaphoretic.   Vitals reviewed.       Assessment/Plan:  Yanni Kaiser is a 49 y.o. female who presents today for :    Yanni was seen today for left sided pain and rash.    Diagnoses and all orders for this visit:    Herpes zoster without complication  -     valACYclovir (VALTREX) 1000 MG tablet; Take 1 tablet (1,000 mg total) by mouth 3 (three) times daily. for 7 days  -     gabapentin (NEURONTIN) 300 MG capsule; Take 1 capsule (300 mg total) by mouth 3 (three) times daily. for 10 days    Reviewed notes and test/labs done from previous week.  Pain is likely due to shingles.  Classic rash.  Treat with Valtrex.  Will avoid NSAIDs due to chronic kidney disease.  Will treat with gabapentin 3 times a day for pain.  Warned patient about potential effects of dizziness/drowsiness with gabapentin especially " when using together with topiramate.  Warned patient about potential for post herpetic neuralgia.    Stage 3 chronic kidney disease  -     Basic metabolic panel; Future    Check BMP today.    Left flank pain  -     Ambulatory referral/consult to Internal Medicine    As above.    Follow-up p.r.n..        This office note has been dictated.  This dictation has been generated using M-Modal Fluency Direct dictation; some phonetic errors may occur.   My collaborating physician is Dr. Jose Rausch.

## 2020-05-25 NOTE — LETTER
May 25, 2020      Kacey Clark MD  [facility]  [place] [address]           [facility]  [address]  [place] [address]  Phone: [phone]          Patient: Yanni Kaiser   MR Number: [id number]   YOB: 1970   Date of Visit: 5/25/2020       Dear Dr. Kacey Clark:    Thank you for referring Yanni Kaiser to me for evaluation. Attached you will find relevant portions of my assessment and plan of care.    If you have questions, please do not hesitate to call me. I look forward to following Yanni Kaiser along with you.    Sincerely,    Noe Prado, NP    Enclosure  CC:  No Recipients    If you would like to receive this communication electronically, please contact [email or web address] or [phone] to request more information on [facility] Link access.    For providers and/or their staff who would like to refer a patient to [facility], please contact us through our one-stop-shop provider referral line, [facility] , at [phone].    If you feel you have received this communication in error or would no longer like to receive these types of communications, please e-mail [email or web address]

## 2020-05-26 ENCOUNTER — TELEPHONE (OUTPATIENT)
Dept: FAMILY MEDICINE | Facility: CLINIC | Age: 50
End: 2020-05-26

## 2020-05-26 DIAGNOSIS — K21.9 GASTROESOPHAGEAL REFLUX DISEASE, ESOPHAGITIS PRESENCE NOT SPECIFIED: Primary | ICD-10-CM

## 2020-05-26 NOTE — TELEPHONE ENCOUNTER
The pt calls with c/o burning on the entire Left side of her body that started this AM. She states the burning is not only where she has the rash. She also c/o lesions inside her mouth on her bottom lip that started this am. She is asking if this normal and to be expected and is there any change in treatment? Please advise.

## 2020-05-29 ENCOUNTER — PATIENT MESSAGE (OUTPATIENT)
Dept: RHEUMATOLOGY | Facility: CLINIC | Age: 50
End: 2020-05-29

## 2020-05-29 ENCOUNTER — HOSPITAL ENCOUNTER (EMERGENCY)
Facility: HOSPITAL | Age: 50
Discharge: HOME | End: 2020-05-29
Attending: EMERGENCY MEDICINE
Payer: MEDICARE

## 2020-05-29 ENCOUNTER — TELEPHONE (OUTPATIENT)
Dept: RHEUMATOLOGY | Facility: CLINIC | Age: 50
End: 2020-05-29

## 2020-05-29 VITALS
TEMPERATURE: 99 F | HEART RATE: 65 BPM | DIASTOLIC BLOOD PRESSURE: 74 MMHG | HEIGHT: 64 IN | SYSTOLIC BLOOD PRESSURE: 161 MMHG | BODY MASS INDEX: 50.02 KG/M2 | RESPIRATION RATE: 15 BRPM | WEIGHT: 293 LBS | OXYGEN SATURATION: 97 %

## 2020-05-29 DIAGNOSIS — R20.0 NUMBNESS: Primary | ICD-10-CM

## 2020-05-29 DIAGNOSIS — M54.32 SCIATICA OF LEFT SIDE: ICD-10-CM

## 2020-05-29 LAB
ALBUMIN SERPL BCP-MCNC: 3.2 G/DL (ref 3.5–5.2)
ALP SERPL-CCNC: 58 U/L (ref 55–135)
ALT SERPL W/O P-5'-P-CCNC: 7 U/L (ref 10–44)
ANION GAP SERPL CALC-SCNC: 9 MMOL/L (ref 8–16)
AST SERPL-CCNC: 11 U/L (ref 10–40)
BASOPHILS # BLD AUTO: 0.07 K/UL (ref 0–0.2)
BASOPHILS NFR BLD: 1.4 % (ref 0–1.9)
BILIRUB SERPL-MCNC: 0.2 MG/DL (ref 0.1–1)
BILIRUB UR QL STRIP: NEGATIVE
BUN SERPL-MCNC: 13 MG/DL (ref 6–20)
CALCIUM SERPL-MCNC: 8.4 MG/DL (ref 8.7–10.5)
CHLORIDE SERPL-SCNC: 113 MMOL/L (ref 95–110)
CK SERPL-CCNC: 45 U/L (ref 20–180)
CLARITY UR: CLEAR
CO2 SERPL-SCNC: 21 MMOL/L (ref 23–29)
COLOR UR: YELLOW
CREAT SERPL-MCNC: 1.4 MG/DL (ref 0.5–1.4)
DIFFERENTIAL METHOD: ABNORMAL
EOSINOPHIL # BLD AUTO: 0.5 K/UL (ref 0–0.5)
EOSINOPHIL NFR BLD: 9.2 % (ref 0–8)
ERYTHROCYTE [DISTWIDTH] IN BLOOD BY AUTOMATED COUNT: 14.9 % (ref 11.5–14.5)
EST. GFR  (AFRICAN AMERICAN): 52 ML/MIN/1.73 M^2
EST. GFR  (NON AFRICAN AMERICAN): 45 ML/MIN/1.73 M^2
GLUCOSE SERPL-MCNC: 87 MG/DL (ref 70–110)
GLUCOSE UR QL STRIP: NEGATIVE
HCT VFR BLD AUTO: 38.5 % (ref 37–48.5)
HGB BLD-MCNC: 12.2 G/DL (ref 12–16)
HGB UR QL STRIP: NEGATIVE
IMM GRANULOCYTES # BLD AUTO: 0.02 K/UL (ref 0–0.04)
IMM GRANULOCYTES NFR BLD AUTO: 0.4 % (ref 0–0.5)
KETONES UR QL STRIP: NEGATIVE
LEUKOCYTE ESTERASE UR QL STRIP: NEGATIVE
LYMPHOCYTES # BLD AUTO: 1.9 K/UL (ref 1–4.8)
LYMPHOCYTES NFR BLD: 37.9 % (ref 18–48)
MAGNESIUM SERPL-MCNC: 2.4 MG/DL (ref 1.6–2.6)
MCH RBC QN AUTO: 30.7 PG (ref 27–31)
MCHC RBC AUTO-ENTMCNC: 31.7 G/DL (ref 32–36)
MCV RBC AUTO: 97 FL (ref 82–98)
MONOCYTES # BLD AUTO: 0.4 K/UL (ref 0.3–1)
MONOCYTES NFR BLD: 8.8 % (ref 4–15)
NEUTROPHILS # BLD AUTO: 2.1 K/UL (ref 1.8–7.7)
NEUTROPHILS NFR BLD: 42.3 % (ref 38–73)
NITRITE UR QL STRIP: NEGATIVE
NRBC BLD-RTO: 0 /100 WBC
PH UR STRIP: 5 [PH] (ref 5–8)
PLATELET # BLD AUTO: 280 K/UL (ref 150–350)
PMV BLD AUTO: 8.9 FL (ref 9.2–12.9)
POTASSIUM SERPL-SCNC: 4.1 MMOL/L (ref 3.5–5.1)
PROT SERPL-MCNC: 6.7 G/DL (ref 6–8.4)
PROT UR QL STRIP: NEGATIVE
RBC # BLD AUTO: 3.97 M/UL (ref 4–5.4)
SODIUM SERPL-SCNC: 143 MMOL/L (ref 136–145)
SP GR UR STRIP: 1.02 (ref 1–1.03)
URN SPEC COLLECT METH UR: NORMAL
UROBILINOGEN UR STRIP-ACNC: NEGATIVE EU/DL
WBC # BLD AUTO: 4.88 K/UL (ref 3.9–12.7)

## 2020-05-29 PROCEDURE — 96374 THER/PROPH/DIAG INJ IV PUSH: CPT | Mod: HCNC

## 2020-05-29 PROCEDURE — 83735 ASSAY OF MAGNESIUM: CPT | Mod: HCNC

## 2020-05-29 PROCEDURE — 82550 ASSAY OF CK (CPK): CPT | Mod: HCNC

## 2020-05-29 PROCEDURE — 80053 COMPREHEN METABOLIC PANEL: CPT | Mod: HCNC

## 2020-05-29 PROCEDURE — 25000003 PHARM REV CODE 250: Mod: HCNC | Performed by: EMERGENCY MEDICINE

## 2020-05-29 PROCEDURE — 99285 EMERGENCY DEPT VISIT HI MDM: CPT | Mod: 25,HCNC

## 2020-05-29 PROCEDURE — 81003 URINALYSIS AUTO W/O SCOPE: CPT | Mod: HCNC

## 2020-05-29 PROCEDURE — 63600175 PHARM REV CODE 636 W HCPCS: Mod: HCNC | Performed by: EMERGENCY MEDICINE

## 2020-05-29 PROCEDURE — 85025 COMPLETE CBC W/AUTO DIFF WBC: CPT | Mod: HCNC

## 2020-05-29 PROCEDURE — 96375 TX/PRO/DX INJ NEW DRUG ADDON: CPT | Mod: HCNC

## 2020-05-29 RX ORDER — LIDOCAINE 50 MG/G
1 PATCH TOPICAL DAILY
Qty: 15 PATCH | Refills: 0 | Status: SHIPPED | OUTPATIENT
Start: 2020-05-29 | End: 2023-02-09 | Stop reason: CLARIF

## 2020-05-29 RX ORDER — FENTANYL CITRATE 50 UG/ML
50 INJECTION, SOLUTION INTRAMUSCULAR; INTRAVENOUS
Status: COMPLETED | OUTPATIENT
Start: 2020-05-29 | End: 2020-05-29

## 2020-05-29 RX ORDER — KETOROLAC TROMETHAMINE 30 MG/ML
15 INJECTION, SOLUTION INTRAMUSCULAR; INTRAVENOUS
Status: COMPLETED | OUTPATIENT
Start: 2020-05-29 | End: 2020-05-29

## 2020-05-29 RX ORDER — ONDANSETRON 2 MG/ML
4 INJECTION INTRAMUSCULAR; INTRAVENOUS
Status: COMPLETED | OUTPATIENT
Start: 2020-05-29 | End: 2020-05-29

## 2020-05-29 RX ORDER — CYCLOBENZAPRINE HCL 10 MG
10 TABLET ORAL 3 TIMES DAILY PRN
Qty: 15 TABLET | Refills: 0 | Status: SHIPPED | OUTPATIENT
Start: 2020-05-29 | End: 2020-06-03

## 2020-05-29 RX ORDER — KETOROLAC TROMETHAMINE 10 MG/1
10 TABLET, FILM COATED ORAL EVERY 6 HOURS
Qty: 15 TABLET | Refills: 0 | Status: SHIPPED | OUTPATIENT
Start: 2020-05-29 | End: 2020-06-12

## 2020-05-29 RX ORDER — MORPHINE SULFATE 10 MG/ML
4 INJECTION INTRAMUSCULAR; INTRAVENOUS; SUBCUTANEOUS
Status: COMPLETED | OUTPATIENT
Start: 2020-05-29 | End: 2020-05-29

## 2020-05-29 RX ORDER — LIDOCAINE 50 MG/G
1 PATCH TOPICAL ONCE
Status: DISCONTINUED | OUTPATIENT
Start: 2020-05-29 | End: 2020-05-30 | Stop reason: HOSPADM

## 2020-05-29 RX ADMIN — KETOROLAC TROMETHAMINE 15 MG: 30 INJECTION, SOLUTION INTRAMUSCULAR at 09:05

## 2020-05-29 RX ADMIN — ONDANSETRON HYDROCHLORIDE 4 MG: 2 SOLUTION INTRAMUSCULAR; INTRAVENOUS at 07:05

## 2020-05-29 RX ADMIN — MORPHINE SULFATE 4 MG: 10 INJECTION INTRAVENOUS at 07:05

## 2020-05-29 RX ADMIN — LIDOCAINE 1 PATCH: 50 PATCH TOPICAL at 09:05

## 2020-05-29 RX ADMIN — FENTANYL CITRATE 50 MCG: 50 INJECTION INTRAMUSCULAR; INTRAVENOUS at 09:05

## 2020-05-29 NOTE — TELEPHONE ENCOUNTER
Called patient after received message about left sided numbness. Pt stated she developed left sided numbness/tingling in her entire left side leg and arm last night associated with weakness. She has not had this prior she stated. Given she has h/o DM, HTN and HLD with risk factors for stroke, informed pt to get to the ED for further evaluation of her new onset symptoms.

## 2020-05-29 NOTE — PROVIDER PROGRESS NOTES - EMERGENCY DEPT.
Emergency Department TeleTRIAGE Encounter Note      CHIEF COMPLAINT    Chief Complaint   Patient presents with    Numbness     left side numbness with pain radiating from neck to left toes,Pt's rheumotlogist told to to come to ED.Pt states she has the shingles x 1 week.Pt has no neuro deciefts       VITAL SIGNS   Initial Vitals [05/29/20 1734]   BP Pulse Resp Temp SpO2   131/77 86 18 98.6 °F (37 °C) 96 %      MAP       --            ALLERGIES    Review of patient's allergies indicates:  No Known Allergies    PROVIDER TRIAGE NOTE  Patient with past medical history diabetes, hypertension, hyperlipidemia presents to the ED for evaluation of numbness.  Patient reports waking up this morning with numbness to her left side.  She states that she has some numbness and tingling in the left side of her face as well.  She denies weakness or paralysis.  She denies difficulties speaking.  Patient states she also has shooting pains down her spine.  Patient was diagnosed with shingles 1 week ago and is on antiviral medication at this time.  Her rash is on the left side of her abdomen.      ORDERS  Labs Reviewed - No data to display    ED Orders (720h ago, onward)    Start Ordered     Status Ordering Provider    05/29/20 1757 05/29/20 1756  CBC auto differential  STAT      Ordered KATYAANDRE G.    05/29/20 1757 05/29/20 1756  Comprehensive metabolic panel  STAT      Ordered KATYAANDRE G.    05/29/20 1757 05/29/20 1756  Insert Saline lock IV  Once      Ordered ANDRE ALDANA G.            Virtual Visit Note: The provider triage portion of this emergency department evaluation and documentation was performed via [employer], a HIPAA-compliant telemedicine application, in concert with a tele-presenter in the room. A face to face patient evaluation with one of my colleagues will occur once the patient is placed in an emergency department room.      DISCLAIMER: This note was prepared with M*Modal voice recognition transcription  software. Garbled syntax, mangled pronouns, and other bizarre constructions may be attributed to that software system.

## 2020-05-30 NOTE — ED PROVIDER NOTES
Encounter Date: 2020    SCRIBE #1 NOTE: I, Vern Betancur, am scribing for, and in the presence of,  Curly Méndez. I have scribed the following portions of the note - Other sections scribed: HPI, ROS, PE.       History     Chief Complaint   Patient presents with    Numbness     left side numbness with pain radiating from neck to left toes,Pt's rheumotlogist told to to come to ED.Pt states she has the shingles x 1 week.Pt has no neuro deciefts     Time seen by provider: 7:18 PM on 2020  This is a 49 y.o. female who is referred to the ED by her rheumatologist for evaluation of c/o left leg weakness/numbness and neck pain radiating from neck to left toes that began this morning with sudden onset. Symptoms are worsening and rated 10/10 in severity. Symptoms worsen when the pt sits down for extended periods. No associated symptoms reported. Patient denies any recent falls or injury, and all other sxs at this time.   Pt states she has the shingles x 1 week.     The history is provided by the patient and medical records.     Review of patient's allergies indicates:  No Known Allergies  Past Medical History:   Diagnosis Date    Allergy     Anemia     Asthma     Back pain     Chronic bronchitis     Cigarette smoker     Depression     Diabetes with neurologic complications     DUB (dysfunctional uterine bleeding) 10/16/2018    GERD (gastroesophageal reflux disease)     High cholesterol     Hyperprolactinemia     Hypertension     Influenza A 2020    Obese body habitus     Pseudotumor cerebri     Renal manifestation of secondary diabetes mellitus     Respiratory failure     Seizures     Simple endometrial hyperplasia 2018    Sleep apnea     Smoker     Tobacco dependence      Past Surgical History:   Procedure Laterality Date    BREAST CYST ASPIRATION       SECTION      X 1    CHOLECYSTECTOMY      COLONOSCOPY      COLONOSCOPY N/A 2019    Procedure: COLONOSCOPY;   Surgeon: Jagruti Sweeney MD;  Location: [facility] (2ND FLR);  Service: Endoscopy;  Laterality: N/A;  BMI is 63/gastroparesis/3 days full liquid diet 1 day clear liquid see telephone encounter by Ciara Brown     ESOPHAGEAL MANOMETRY WITH MEASUREMENT OF IMPEDANCE N/A 11/5/2019    Procedure: MANOMETRY-ESOPHAGEAL-WITH IMPEDANCE;  Surgeon: Jagruti Sweeney MD;  Location: [facility] (2ND FLR);  Service: Endoscopy;  Laterality: N/A;  Hold Narcotics x 1 days   Hold TCA x 1 days  Propofol only. No fentanyl or benzodiazepine during sedation. If additional sedation needed, discuss with Dr. Sweeney.  10/29 - pt confirmed appt    ESOPHAGOGASTRODUODENOSCOPY N/A 1/14/2019    Procedure: EGD (ESOPHAGOGASTRODUODENOSCOPY);  Surgeon: Jagruti Sweeney MD;  Location: [facility] (2ND FLR);  Service: Endoscopy;  Laterality: N/A;  BMI is 63/gastroparesis/3 days full liquid diet 1 day clear liquid see telephone encounter by Ciara Brown     ESOPHAGOGASTRODUODENOSCOPY N/A 11/5/2019    Procedure: ESOPHAGOGASTRODUODENOSCOPY (EGD);  Surgeon: Jagruti Sweeney MD;  Location: [facility] (2ND FLR);  Service: Endoscopy;  Laterality: N/A;  EGD with EndoFlip /+/- Botox  2nd floor for gastroparesis/BMI 63 **367lbs**  Bariatric Stretcher needed  Manometry probe to be placed endoscopically during EGD procedure  Due to change in protocol, Full liquid diet for 3 days/ 1 day of clear liquids  Hi    FOOT FRACTURE SURGERY Left     HYSTEROSCOPY WITH DILATION AND CURETTAGE OF UTERUS N/A 10/16/2018    K[facility]    PLANTAR FASCIOTOMY Left 11/18/2019    Procedure: FASCIOTOMY, PLANTAR;  Surgeon: Valentino Orourke DPM;  [facility];  Service: Podiatry;  Laterality: Left;    RETINAL LASER PROCEDURE Left     SINUS SURGERY      UPPER GASTROINTESTINAL ENDOSCOPY       Family History   Problem Relation Age of Onset    Hypertension Mother     Diabetes Mother     Colon cancer Mother 50    Colon polyps Mother     Heart disease Father     COPD  Father     Heart attack Father     Hypertension Father     Ulcers Father     Cancer Maternal Grandmother     Breast cancer Maternal Grandmother     Colon cancer Maternal Grandmother     Colon polyps Maternal Grandmother     No Known Problems Paternal Grandmother     No Known Problems Brother     No Known Problems Maternal Aunt     No Known Problems Maternal Uncle     No Known Problems Paternal Aunt     No Known Problems Paternal Uncle     No Known Problems Maternal Grandfather     No Known Problems Paternal Grandfather     Celiac disease Neg Hx     Cirrhosis Neg Hx     Crohn's disease Neg Hx     Cystic fibrosis Neg Hx     Esophageal cancer Neg Hx     Hemochromatosis Neg Hx     Inflammatory bowel disease Neg Hx     Irritable bowel syndrome Neg Hx     Liver cancer Neg Hx     Liver disease Neg Hx     Rectal cancer Neg Hx     Stomach cancer Neg Hx     Ulcerative colitis Neg Hx     Jonnie's disease Neg Hx     Tuberculosis Neg Hx     Lymphoma Neg Hx     Scleroderma Neg Hx     Rheum arthritis Neg Hx     Melanoma Neg Hx     Multiple sclerosis Neg Hx     Psoriasis Neg Hx     Lupus Neg Hx     Skin cancer Neg Hx     Amblyopia Neg Hx     Blindness Neg Hx     Cataracts Neg Hx     Glaucoma Neg Hx     Macular degeneration Neg Hx     Retinal detachment Neg Hx     Strabismus Neg Hx     Stroke Neg Hx     Thyroid disease Neg Hx      Social History     Tobacco Use    Smoking status: Current Every Day Smoker     Packs/day: 1.00     Years: 27.00     Pack years: 27.00     Types: Cigarettes     Start date: 1/1/1992    Smokeless tobacco: Never Used    Tobacco comment: 1/2 a pack if her days are good   Substance Use Topics    Alcohol use: Yes     Alcohol/week: 0.0 standard drinks     Frequency: Never     Comment: socially    Drug use: No     Review of Systems   Constitutional: Negative.    HENT: Negative.    Eyes: Negative.    Respiratory: Negative.    Cardiovascular: Negative.     Gastrointestinal: Negative.    Genitourinary: Negative.    Musculoskeletal: Positive for myalgias and neck pain.   Skin: Negative.    Neurological: Positive for weakness and numbness.       Physical Exam     Initial Vitals [05/29/20 1734]   BP Pulse Resp Temp SpO2   131/77 86 18 98.6 °F (37 °C) 96 %      MAP       --         Physical Exam    Nursing note and vitals reviewed.  Constitutional: She appears well-developed. She is not diaphoretic. She appears distressed (mildly).   HENT:   Head: Normocephalic and atraumatic.   Right Ear: Tympanic membrane normal.   Left Ear: Tympanic membrane normal.   Nose: Nose normal.   Mouth/Throat: Uvula is midline, oropharynx is clear and moist and mucous membranes are normal.   Eyes: EOM are normal. Pupils are equal, round, and reactive to light.   Neck: Trachea normal, normal range of motion, full passive range of motion without pain and phonation normal. Neck supple. No stridor present. No spinous process tenderness and no muscular tenderness present. Normal range of motion present. No neck rigidity.   Cardiovascular: Normal rate, regular rhythm, normal heart sounds and intact distal pulses. Exam reveals no gallop and no friction rub.    No murmur heard.  Pulmonary/Chest: Effort normal and breath sounds normal. No respiratory distress. She has no wheezes. She has no rhonchi. She has no rales.   Abdominal: Soft. Bowel sounds are normal. There is no tenderness. There is no rebound and no guarding.   Musculoskeletal: Normal range of motion. She exhibits tenderness (over left lower lumbar paraspinal area, right trapezius border, + straight leg raise).   Gait antalgic   Neurological: She is alert and oriented to person, place, and time. She has normal strength. No cranial nerve deficit or sensory deficit.   Skin: Skin is warm and dry. Capillary refill takes less than 2 seconds. Rash (dry and crusted vesicular rash dermatomal over left parathoracic spine) noted.   Psychiatric: She  has a normal mood and affect.         ED Course   Procedures  Labs Reviewed   CBC W/ AUTO DIFFERENTIAL - Abnormal; Notable for the following components:       Result Value    RBC 3.97 (*)     Mean Corpuscular Hemoglobin Conc 31.7 (*)     RDW 14.9 (*)     MPV 8.9 (*)     Eosinophil% 9.2 (*)     All other components within normal limits   COMPREHENSIVE METABOLIC PANEL - Abnormal; Notable for the following components:    Chloride 113 (*)     CO2 21 (*)     Calcium 8.4 (*)     Albumin 3.2 (*)     ALT 7 (*)     eGFR if  52 (*)     eGFR if non  45 (*)     All other components within normal limits   CK   MAGNESIUM   URINALYSIS, REFLEX TO URINE CULTURE    Narrative:     Preferred Collection Type->Urine, Clean Catch          Imaging Results          X-Ray Chest AP Portable (Final result)  Result time 05/29/20 20:50:16    Final result by Froylan Jack MD (05/29/20 20:50:16)                 Impression:      1. Interstitial findings are likely related to habitus and shallow inspiratory effort, no large focal consolidation.      Electronically signed by: Froylan Jack MD  Date:    05/29/2020  Time:    20:50             Narrative:    EXAMINATION:  XR CHEST AP PORTABLE    CLINICAL HISTORY:  Anesthesia of skin    TECHNIQUE:  Single frontal view of the chest was performed.    COMPARISON:  01/16/2020    FINDINGS:  The cardiomediastinal silhouette is prominent, similar to the previous examination noting some magnification by technique.  There is no pleural effusion.  The trachea is midline.  The lungs are symmetrically expanded bilaterally with mildly coarse interstitial attenuation.  No large focal consolidation seen.  There is no pneumothorax.  The osseous structures are unremarkable.                               CT Head Without Contrast (Final result)  Result time 05/29/20 20:04:15    Final result by Froylan Jack MD (05/29/20 20:04:15)                 Impression:      1. No acute  intracranial abnormalities.      Electronically signed by: Froylan Jack MD  Date:    05/29/2020  Time:    20:04             Narrative:    EXAMINATION:  CT HEAD WITHOUT CONTRAST    CLINICAL HISTORY:  Syncope/fainting;    TECHNIQUE:  Low dose axial images were obtained through the head.  Coronal and sagittal reformations were also performed. Contrast was not administered.    COMPARISON:  MRI 07/11/2018, CT 04/15/2017    FINDINGS:  Please note, there is motion artifact.    There is no evidence of acute major vascular territory infarct, hemorrhage, or mass.  There is no hydrocephalus.  There are no abnormal extra-axial fluid collections.  The paranasal sinuses and mastoid air cells are clear, and there is no evidence of calvarial fracture.  The visualized soft tissues are unremarkable.                                 Medical Decision Making:   History:   Old Medical Records: I decided to obtain old medical records.  Initial Assessment:   Past medical records queried and reviewed.  This is a 49 y.o. female who is referred to the ED by her rheumatologist for evaluation of c/o left leg weakness/numbness and neck pain radiating from neck to left toes that began this morning with sudden onset. The patient was seen and examined. The history and physical exam was obtained. The nursing notes and vital signs were reviewed.     MDM:    49 y.o.female with PMHx as noted above presents with multiple complaints, primarily left leg weakness and lower back pain. Physical exam remarkable for mildly distressed appearing female, conversing with ease, abdomen soft, nt/nd, CTAB, no peripheral edema noted, no pedal edema noted, strength 5/5 in RLE with 4/5 strength in LLE suspected 2/2 volition or limited by pain, distal pulses intact, ttp over right trapezius border and over left lower lumbar area, with + straight leg raise, gait antalgic with limp on left side, however able to be mobile with no assistance.  ED workup remarkable for ua  unremarkable, CPK - 45, Mg - 2.4, CBC wnl, CMP - unremarkable, CXR - unremarkable, CT head unremarkable.  Pt presentation consistent with sciatica with left leg raise, without urinary retention, inability to walk/bear weight, or further red flags to suggest Neurosurgical emergency.  Pt with subjective weakness in LLE, as she was noted to be able to cross her left leg over her right leg and move her toes, however would not activate her muscles during exam.  Pt treated for her suspected lumbar strain with reassessment showing marked improvement in compliance with exam and no sensation deficits noted.  Discussed further sxm care at home including close f/u with her PCP/Rheumatologist later this week. At this time given patient's history, physical exam, and ED workup do not suspect disseminated zoster, encephalitis/meningitis, cauda equina, CVA/TIA, myositis, herniated disc, aortic dissection, or any further malignant cause. Discussed diagnosis and further treatment with patient, including f/u with PCP in the next week.  Return precautions given and all questions answered.  Patient in understanding of plan.  Pt discharged to home improved and stable.            Scribe Attestation:   Scribe #1: I performed the above scribed service and the documentation accurately describes the services I performed. I attest to the accuracy of the note.                          Clinical Impression:       ICD-10-CM ICD-9-CM   1. Numbness R20.0 782.0   2. Sciatica of left side M54.32 724.3             ED Disposition Condition    Discharge Stable        ED Prescriptions     Medication Sig Dispense Start Date End Date Auth. Provider    ketorolac (TORADOL) 10 mg tablet Take 1 tablet (10 mg total) by mouth every 6 (six) hours. 15 tablet 5/29/2020  Curly Méndez MD    lidocaine (LIDODERM) 5 % Place 1 patch onto the skin once daily. Remove & Discard patch within 12 hours or as directed by MD 15 patch 5/29/2020  Curly Méndez MD     cyclobenzaprine (FLEXERIL) 10 MG tablet Take 1 tablet (10 mg total) by mouth 3 (three) times daily as needed. 15 tablet 5/29/2020 6/3/2020 Curly Méndez MD        Follow-up Information     Follow up With Specialties Details Why Contact Info    [facility] Emergency Medicine Go to  If symptoms worsen [address] [facility] [id number] [phone]    Carlyle Gordillo MD Family Medicine, Wound Care Go in 1 week  [address] [facility] [address]  [phone]                         I, Curly Méndez M.D., personally performed the services described in this documentation. All medical record entries made by the scribe were at my direction and in my presence.  I have reviewed the chart and agree that the record reflects my personal performance and is accurate and complete                   Curly Méndez MD  05/30/20 1813

## 2020-06-04 NOTE — PROGRESS NOTES
7/11/2018    TSH 1.108   hCG Quant <1.2   FSH 36.20   Prolactin 42.2 (H)   INCREASED PROLACTIN---RECENT MRI NEG. PROLACTIN 1 YEAR.    07/11/18 U/S  FINDINGS:  There is poor visualization of the uterus and pelvic structures due to patient body habitus and on transabdominal imaging incomplete bladder distension.  The uterus is enlarged in size and measures 12.8 x 6.8 x 7.8 cm.  The endometrial stripe is not visualized.  The ovaries are not visualized.   Impression     Nonvisualization of the ovaries.  Limited evaluation of the uterus demonstrates enlargement.       PT HERE FOR ANNUAL.  HAS PELVIC PAIN FOR ? TIME>2 MONTHS.  NO MENSES IN 1 YEAR.  HAD ABCESS FORM UNDER R BREAST THAT HAS DRAINED AND NOW IS A LUMP  Location = B LOWER ABD AND PELVIS . Quality= ACHE AND SHARP INTERMITTENT  . -N/ -V.  -D/ -C.  -Uti sx. - Vag d/c.  -BRBPR.  -Dyschezia.  +++Dysparunia.  N/A Dysmenorhea.       ROS:  GENERAL: No fever, chills, fatigability or weight loss.  VULVAR: No pain, no lesions and no itching.  VAGINAL: No relaxation, no itching, no discharge, no abnormal bleeding and no lesions.  ABDOMEN: No abdominal pain. Denies nausea. Denies vomiting. No diarrhea. No constipation  BREAST: Denies pain. No lumps. No discharge.  URINARY: No incontinence, no nocturia, no frequency and no dysuria.  CARDIOVASCULAR: No chest pain. No shortness of breath. No leg cramps.  NEUROLOGICAL: No headaches. No vision changes.  The remainder of the review of systems was negative.    PE:  General Appearance: obese And Well developed. Well nourished. In no acute distress.  Vulva: Lesions: No.  Urethral Meatus: Normal size. Normal location. No lesions. No prolapse.  Urethra: No masses. No tenderness. No prolapse. No scarring.  Bladder: No masses. No tenderness.  Vagina: Mucosa NI:yes discharge no, atrophy yes, cystocele no or rectocele no.  Cervix: Lesion: no  Stenotic: no Cervical motion tenderness: no  Uterus: UNABLE DUE TO WEIGHT  Adnexa: UNABLE DUE  TO WEIGHT  Abdomen: obese   Breasts: No bilateral masses. No bilateral discharge. No bilateral tenderness. No bilateral fibrocystic changes.  Neck: No thyroid enlargement. No thyroid masses.  Skin: Rashes: No      PROCEDURES:    PLAN:     DIAGNOSIS:  1. Encounter for gynecological examination without abnormal finding    2. Amenorrhea    3. Pelvic pain in female    4. Body mass index 60.0-69.9, adult        MEDICATIONS & ORDERS:  Orders Placed This Encounter    Urine culture    C. trachomatis/N. gonorrhoeae by AMP DNA Cervix    US Pelvis Comp with Transvag NON-OB (xpd    hCG, quantitative    Follicle stimulating hormone    Prolactin    TSH    Urinalysis    Liquid-based pap smear, screening    naproxen (NAPROSYN) 500 MG tablet       Patient was counseled today on the new ACS guidelines for cervical cytology screening as well as the current recommendations for breast cancer screening. She was counseled to follow up with her PCP for other routine health maintenance. Counseling session lasted approximately 10 minutes, and all her questions were answered.         FOLLOW-UP: With me in PRN        Hgb 7.1 Hct 20.2 Plt 1

## 2020-06-08 ENCOUNTER — PATIENT OUTREACH (OUTPATIENT)
Dept: ADMINISTRATIVE | Facility: OTHER | Age: 50
End: 2020-06-08

## 2020-06-09 ENCOUNTER — OFFICE VISIT (OUTPATIENT)
Dept: PAIN MEDICINE | Facility: CLINIC | Age: 50
End: 2020-06-09
Payer: MEDICARE

## 2020-06-09 ENCOUNTER — TELEPHONE (OUTPATIENT)
Dept: PAIN MEDICINE | Facility: OTHER | Age: 50
End: 2020-06-09

## 2020-06-09 VITALS
DIASTOLIC BLOOD PRESSURE: 87 MMHG | OXYGEN SATURATION: 100 % | TEMPERATURE: 99 F | BODY MASS INDEX: 50.02 KG/M2 | WEIGHT: 293 LBS | HEIGHT: 64 IN | HEART RATE: 68 BPM | RESPIRATION RATE: 18 BRPM | SYSTOLIC BLOOD PRESSURE: 127 MMHG

## 2020-06-09 DIAGNOSIS — E11.42 DIABETIC POLYNEUROPATHY ASSOCIATED WITH TYPE 2 DIABETES MELLITUS: ICD-10-CM

## 2020-06-09 DIAGNOSIS — M54.41 CHRONIC MIDLINE LOW BACK PAIN WITH BILATERAL SCIATICA: ICD-10-CM

## 2020-06-09 DIAGNOSIS — M48.061 SPINAL STENOSIS OF LUMBAR REGION, UNSPECIFIED WHETHER NEUROGENIC CLAUDICATION PRESENT: ICD-10-CM

## 2020-06-09 DIAGNOSIS — M54.16 LUMBAR RADICULOPATHY: ICD-10-CM

## 2020-06-09 DIAGNOSIS — G89.4 CHRONIC PAIN DISORDER: ICD-10-CM

## 2020-06-09 DIAGNOSIS — M79.18 MYOFASCIAL PAIN: ICD-10-CM

## 2020-06-09 DIAGNOSIS — M54.42 CHRONIC MIDLINE LOW BACK PAIN WITH BILATERAL SCIATICA: ICD-10-CM

## 2020-06-09 DIAGNOSIS — G89.29 CHRONIC MIDLINE LOW BACK PAIN WITH BILATERAL SCIATICA: ICD-10-CM

## 2020-06-09 DIAGNOSIS — Z01.812 PRE-PROCEDURE LAB EXAM: Primary | ICD-10-CM

## 2020-06-09 DIAGNOSIS — M51.36 DDD (DEGENERATIVE DISC DISEASE), LUMBAR: ICD-10-CM

## 2020-06-09 DIAGNOSIS — M79.7 FIBROMYALGIA: ICD-10-CM

## 2020-06-09 DIAGNOSIS — F17.200 TOBACCO USE DISORDER: Primary | ICD-10-CM

## 2020-06-09 PROBLEM — M51.369 DDD (DEGENERATIVE DISC DISEASE), LUMBAR: Status: ACTIVE | Noted: 2020-06-09

## 2020-06-09 PROCEDURE — 3044F PR MOST RECENT HEMOGLOBIN A1C LEVEL <7.0%: ICD-10-PCS | Mod: HCNC,CPTII,S$GLB, | Performed by: ANESTHESIOLOGY

## 2020-06-09 PROCEDURE — 3079F DIAST BP 80-89 MM HG: CPT | Mod: HCNC,CPTII,S$GLB, | Performed by: ANESTHESIOLOGY

## 2020-06-09 PROCEDURE — 3079F PR MOST RECENT DIASTOLIC BLOOD PRESSURE 80-89 MM HG: ICD-10-PCS | Mod: HCNC,CPTII,S$GLB, | Performed by: ANESTHESIOLOGY

## 2020-06-09 PROCEDURE — 3074F SYST BP LT 130 MM HG: CPT | Mod: HCNC,CPTII,S$GLB, | Performed by: ANESTHESIOLOGY

## 2020-06-09 PROCEDURE — 99999 PR PBB SHADOW E&M-EST. PATIENT-LVL III: CPT | Mod: PBBFAC,HCNC,, | Performed by: ANESTHESIOLOGY

## 2020-06-09 PROCEDURE — 99499 RISK ADDL DX/OHS AUDIT: ICD-10-PCS | Mod: HCNC,S$GLB,, | Performed by: ANESTHESIOLOGY

## 2020-06-09 PROCEDURE — 3008F BODY MASS INDEX DOCD: CPT | Mod: HCNC,CPTII,S$GLB, | Performed by: ANESTHESIOLOGY

## 2020-06-09 PROCEDURE — 99204 OFFICE O/P NEW MOD 45 MIN: CPT | Mod: HCNC,S$GLB,, | Performed by: ANESTHESIOLOGY

## 2020-06-09 PROCEDURE — 3008F PR BODY MASS INDEX (BMI) DOCUMENTED: ICD-10-PCS | Mod: HCNC,CPTII,S$GLB, | Performed by: ANESTHESIOLOGY

## 2020-06-09 PROCEDURE — 3044F HG A1C LEVEL LT 7.0%: CPT | Mod: HCNC,CPTII,S$GLB, | Performed by: ANESTHESIOLOGY

## 2020-06-09 PROCEDURE — 99999 PR PBB SHADOW E&M-EST. PATIENT-LVL III: ICD-10-PCS | Mod: PBBFAC,HCNC,, | Performed by: ANESTHESIOLOGY

## 2020-06-09 PROCEDURE — 99499 UNLISTED E&M SERVICE: CPT | Mod: HCNC,S$GLB,, | Performed by: ANESTHESIOLOGY

## 2020-06-09 PROCEDURE — 99204 PR OFFICE/OUTPT VISIT, NEW, LEVL IV, 45-59 MIN: ICD-10-PCS | Mod: HCNC,S$GLB,, | Performed by: ANESTHESIOLOGY

## 2020-06-09 PROCEDURE — 3074F PR MOST RECENT SYSTOLIC BLOOD PRESSURE < 130 MM HG: ICD-10-PCS | Mod: HCNC,CPTII,S$GLB, | Performed by: ANESTHESIOLOGY

## 2020-06-09 RX ORDER — TIZANIDINE 4 MG/1
4 TABLET ORAL EVERY 6 HOURS PRN
Qty: 30 TABLET | Refills: 0 | Status: SHIPPED | OUTPATIENT
Start: 2020-06-09 | End: 2020-06-19

## 2020-06-09 RX ORDER — PREGABALIN 50 MG/1
50 CAPSULE ORAL 2 TIMES DAILY
Qty: 60 CAPSULE | Refills: 0 | Status: SHIPPED | OUTPATIENT
Start: 2020-06-09 | End: 2020-07-14

## 2020-06-09 NOTE — LETTER
June 9, 2020      Elysia Contreras, DO  [address]  [place] [address]           [facility]  [address]  [place] [address]  Phone: [phone]  Fax: [phone]          Patient: Yanni Kaiser   MR Number: [id number]   YOB: 1970   Date of Visit: 6/9/2020       Dear Dr. Elysia Contreras:    Thank you for referring Yanni Kaiser to me for evaluation. Attached you will find relevant portions of my assessment and plan of care.    If you have questions, please do not hesitate to call me. I look forward to following Yanni Kaiser along with you.    Sincerely,    Lawrence Marin MD    Enclosure  CC:  No Recipients    If you would like to receive this communication electronically, please contact [email or web address] or [phone] to request more information on [facility] Link access.    For providers and/or their staff who would like to refer a patient to [facility], please contact us through our one-stop-shop provider referral line, [facility], at [phone].    If you feel you have received this communication in error or would no longer like to receive these types of communications, please e-mail [email or web address]

## 2020-06-09 NOTE — PROGRESS NOTES
Chronic Pain - New Consult    Referring Physician: Elysia Contreras DO    Chief Complaint:   Chief Complaint   Patient presents with    Leg Pain    Low-back Pain        SUBJECTIVE:    Yanni Kaiser presents to the clinic for the evaluation of lower back pain. The pain started 3 years ago following no specific incident and symptoms have been worsening.The pain is located in the lumbar area and radiates to the left leg mostly but also into the right leg. Non-specific dermatomal distribution.  The pain is described as aching, sharp and shooting and is rated as 10/10. The pain is rated with a score of  10/10 on the BEST day and a score of 10/10 on the WORST day.  Symptoms interfere with daily activity and sleeping. The pain is exacerbated by Sitting, Walking, Lifting and Getting out of bed/chair.  The pain is mitigated by heat, ice, medications and physical therapy. She reports spending 4 hours per day reclining. The patient reports 0 hours of uninterrupted sleep per night.    Patient denies none.    Physical Therapy/Home Exercise: yes      Pain Disability Index Review:  Last 3 PDI Scores 6/9/2020   Pain Disability Index (PDI) 69       Pain Medications:    - Opioids: Percocet (Oxycodone/Acetaminophen)  - Adjuvant Medications: Advil,Motrin ( Ibuprofen)   Lidoderm patch, without benefit  Voltaren gel, without benefit  Gabapentin, side effects  Lamotrigine     report:  Reviewed and consistent with medication use as prescribed.    Pain Procedures: none    Imaging:   Narrative     EXAMINATION:  MRI LUMBAR SPINE WITHOUT CONTRAST    CLINICAL HISTORY:  spinal stenosis; Lumbago with sciatica, right side    TECHNIQUE:  Multiplanar, multisequence MR images were acquired from the thoracolumbar junction to the sacrum without contrast.    COMPARISON:  MRI lumbar spine 08/28/2018    FINDINGS:  Straightening of the normal lumbar lordosis.  Mild grade 1 retrolisthesis L5 on S1    Vertebral body heights are maintained.   Several T1 and T2 hyperintense lesions within the L2 and L3 vertebral body favored to represent osseous hemangiomas.  Otherwise normal marrow signal.  No fracture.    Multilevel partial disc desiccation throughout the lumbar spine.  Posterior annular fissuring at L2-L3.    Distal spinal cord is unremarkable.  Conus terminates at L1-L2.    Degenerative findings:    T12-L1: No disc herniation, spinal canal stenosis, or neural foraminal narrowing.    L1-L2: No disc herniation, spinal canal stenosis, or neural foraminal narrowing.    L2-L3: No disc herniation, spinal canal stenosis or neural foraminal narrowing.    L3-L4: Mild circumferential disc bulge without spinal canal stenosis or neural foraminal narrowing.    L4-L5: Circumferential disc bulge, facet arthropathy, and ligamentum flavum hypertrophy resulting in moderate spinal canal stenosis and moderate bilateral neural foraminal narrowing.    L5-S1: Circumferential disc bulge and facet arthropathy without spinal canal stenosis or neural foraminal narrowing.    Paraspinal muscles and soft tissues are unremarkable.      Impression       Multilevel lumbar spondylosis most prominent at L4-5 resulting in moderate spinal canal stenosis and moderate bilateral neural foraminal narrowing.  Findings have slightly progressed in comparison to prior study dated 08/28/2018.    Electronically signed by resident: Fady Reese  Date: 05/22/2020  Time: 08:27    Electronically signed by: Abelardo Gold MD  Date: 05/22/2020  Time: 08:57         Past Medical History:   Diagnosis Date    Allergy     Anemia     Asthma     Back pain     Chronic bronchitis     Cigarette smoker     Depression     Diabetes with neurologic complications     DUB (dysfunctional uterine bleeding) 10/16/2018    GERD (gastroesophageal reflux disease)     High cholesterol     Hyperprolactinemia     Hypertension     Influenza A 01/16/2020    Obese body habitus     Pseudotumor cerebri     Renal  manifestation of secondary diabetes mellitus     Respiratory failure     Seizures     Simple endometrial hyperplasia 2018    Sleep apnea     Smoker     Tobacco dependence      Past Surgical History:   Procedure Laterality Date    BREAST CYST ASPIRATION       SECTION      X 1    CHOLECYSTECTOMY      COLONOSCOPY      COLONOSCOPY N/A 2019    Procedure: COLONOSCOPY;  Surgeon: Jagruti Sweeney MD;  [facility]  Service: Endoscopy;  Laterality: N/A;  BMI is 63/gastroparesis/3 days full liquid diet 1 day clear liquid see telephone encounter by Ciara Pinto [facility]    ESOPHAGEAL MANOMETRY WITH MEASUREMENT OF IMPEDANCE N/A 2019    Procedure: MANOMETRY-ESOPHAGEAL-WITH IMPEDANCE;  Surgeon: Jagruti Sweeney MD;  Location: [facility] (2ND FLR);  Service: Endoscopy;  Laterality: N/A;  Hold Narcotics x 1 days   Hold TCA x 1 days  Propofol only. No fentanyl or benzodiazepine during sedation. If additional sedation needed, discuss with Dr. Sweeney.  10/29 - pt confirmed appt    ESOPHAGOGASTRODUODENOSCOPY N/A 2019    Procedure: EGD (ESOPHAGOGASTRODUODENOSCOPY);  Surgeon: Jagruti Sweeney MD;  [facility]  Service: Endoscopy;  Laterality: N/A;  BMI is 63/gastroparesis/3 days full liquid diet 1 day clear liquid see telephone encounter by Ciara Brown     ESOPHAGOGASTRODUODENOSCOPY N/A 2019    Procedure: ESOPHAGOGASTRODUODENOSCOPY (EGD);  Surgeon: Jagruti Sweeney MD;  [facility]  Service: Endoscopy;  Laterality: N/A;  EGD with EndoFlip /+/- Botox  2nd floor for gastroparesis/BMI 63 **367lbs**  Bariatric Stretcher needed  Manometry probe to be placed endoscopically during EGD procedure  Due to change in protocol, Full liquid diet for 3 days/ 1 day of clear liquids  Hi    FOOT FRACTURE SURGERY Left     HYSTEROSCOPY WITH DILATION AND CURETTAGE OF UTERUS N/A 10/16/2018    [facility]    PLANTAR FASCIOTOMY Left 2019    Procedure:  FASCIOTOMY, PLANTAR;  Surgeon: Valentino Orourke DPM;  Location: [facility] OR [address] [place] [address];  Service: Podiatry;  Laterality: Left;    RETINAL LASER PROCEDURE Left     SINUS SURGERY      UPPER GASTROINTESTINAL ENDOSCOPY       Social History     Socioeconomic History    Marital status:      Spouse name: Not on file    Number of children: Not on file    Years of education: Not on file    Highest education level: Not on file   Occupational History    Not on file   Social Needs    Financial resource strain: Very hard    Food insecurity:     Worry: Often true     Inability: Often true    Transportation needs:     Medical: No     Non-medical: No   Tobacco Use    Smoking status: Current Every Day Smoker     Packs/day: 1.00     Years: 27.00     Pack years: 27.00     Types: Cigarettes     Start date: 1/1/1992    Smokeless tobacco: Never Used    Tobacco comment: 1/2 a pack if her days are good   Substance and Sexual Activity    Alcohol use: Yes     Alcohol/week: 0.0 standard drinks     Frequency: Never     Comment: socially    Drug use: No    Sexual activity: Yes     Partners: Male     Birth control/protection: None   Lifestyle    Physical activity:     Days per week: Not on file     Minutes per session: Not on file    Stress: Not on file   Relationships    Social connections:     Talks on phone: More than three times a week     Gets together: Three times a week     Attends [employer] service: More than 4 times per year     Active member of club or organization: Yes     Attends meetings of clubs or organizations: More than 4 times per year     Relationship status:    Other Topics Concern    Not on file   Social History Narrative    Not on file     Family History   Problem Relation Age of Onset    Hypertension Mother     Diabetes Mother     Colon cancer Mother 50    Colon polyps Mother     Heart disease Father     COPD Father     Heart attack Father     Hypertension Father     Ulcers Father      Cancer Maternal Grandmother     Breast cancer Maternal Grandmother     Colon cancer Maternal Grandmother     Colon polyps Maternal Grandmother     No Known Problems Paternal Grandmother     No Known Problems Brother     No Known Problems Maternal Aunt     No Known Problems Maternal Uncle     No Known Problems Paternal Aunt     No Known Problems Paternal Uncle     No Known Problems Maternal Grandfather     No Known Problems Paternal Grandfather     Celiac disease Neg Hx     Cirrhosis Neg Hx     Crohn's disease Neg Hx     Cystic fibrosis Neg Hx     Esophageal cancer Neg Hx     Hemochromatosis Neg Hx     Inflammatory bowel disease Neg Hx     Irritable bowel syndrome Neg Hx     Liver cancer Neg Hx     Liver disease Neg Hx     Rectal cancer Neg Hx     Stomach cancer Neg Hx     Ulcerative colitis Neg Hx     Jonnie's disease Neg Hx     Tuberculosis Neg Hx     Lymphoma Neg Hx     Scleroderma Neg Hx     Rheum arthritis Neg Hx     Melanoma Neg Hx     Multiple sclerosis Neg Hx     Psoriasis Neg Hx     Lupus Neg Hx     Skin cancer Neg Hx     Amblyopia Neg Hx     Blindness Neg Hx     Cataracts Neg Hx     Glaucoma Neg Hx     Macular degeneration Neg Hx     Retinal detachment Neg Hx     Strabismus Neg Hx     Stroke Neg Hx     Thyroid disease Neg Hx        Review of patient's allergies indicates:  No Known Allergies    Current Outpatient Medications   Medication Sig    acetaZOLAMIDE (DIAMOX) 500 mg CpSR Take 1 capsule (500 mg total) by mouth 2 (two) times daily.    albuterol (PROVENTIL/VENTOLIN HFA) 90 mcg/actuation inhaler Inhale 2 puffs into the lungs every 6 (six) hours as needed. Rescue    blood sugar diagnostic Strp 1 each by Misc.(Non-Drug; Combo Route) route 4 (four) times daily before meals and nightly. ACCU CHEK ELVIA PLUS METER    blood-glucose meter (ACCU-CHEK ELVIA PLUS METER) Misc TEST FOUR TIMES DAILY BEFORE MEALS AND EVERY NIGHT    desloratadine (CLARINEX) 5 mg  tablet Take 1 tablet (5 mg total) by mouth once daily.    diclofenac sodium (VOLTAREN) 1 % Gel Apply 2 g topically once daily.    fluticasone propionate (FLONASE) 50 mcg/actuation nasal spray 1 spray (50 mcg total) by Each Nare route once daily.    ketorolac (TORADOL) 10 mg tablet Take 1 tablet (10 mg total) by mouth every 6 (six) hours.    lancets (ACCU-CHEK SOFTCLIX LANCETS) Misc 1 Device by Misc.(Non-Drug; Combo Route) route 4 (four) times daily.    lidocaine (LIDODERM) 5 % Place 1 patch onto the skin once daily. Remove & Discard patch within 12 hours or as directed by MD    losartan (COZAAR) 100 MG tablet TAKE 1 TABLET(100 MG) BY MOUTH EVERY DAY    metFORMIN (GLUCOPHAGE-XR) 500 MG XR 24hr tablet TAKE 2 TABLETS(1000 MG) BY MOUTH TWICE DAILY WITH MEALS    ondansetron (ZOFRAN) 8 MG tablet Take 1 tablet (8 mg total) by mouth every 8 (eight) hours as needed for Nausea.    oxyCODONE-acetaminophen (PERCOCET) 7.5-325 mg per tablet Take 1 tablet by mouth every 12 (twelve) hours as needed for Pain.    pantoprazole (PROTONIX) 40 MG tablet Take 40 mg by mouth once daily.     prucalopride 2 mg Tab Take 1 tablet by mouth once daily.    QUEtiapine (SEROQUEL) 25 MG Tab Take 1 tablet (25 mg total) by mouth once daily.    semaglutide (OZEMPIC) 1 mg/dose (2 mg/1.5 mL) PnIj Inject 1 mg subq weekly.    sumatriptan (IMITREX) 50 MG tablet Take 1 tab at onset of headaches.  If no improvement in 2 hours, take another.  Do not take more than 2 tabs in 24 hours.    topiramate (TOPAMAX) 100 MG tablet TAKE 3 TABLETS BY MOUTH TWICE DAILY    venlafaxine (EFFEXOR-XR) 75 MG 24 hr capsule TAKE 1 CAPSULE(75 MG) BY MOUTH EVERY EVENING    albuterol-ipratropium (DUO-NEB) 2.5 mg-0.5 mg/3 mL nebulizer solution Take 3 mLs by nebulization every 6 (six) hours as needed for Wheezing or Shortness of Breath. Rescue    atorvastatin (LIPITOR) 40 MG tablet Take 1 tablet (40 mg total) by mouth once daily. (Patient not taking: Reported on  "6/9/2020)    budesonide-formoterol 160-4.5 mcg (SYMBICORT) 160-4.5 mcg/actuation HFAA Inhale 2 puffs into the lungs every 12 (twelve) hours. Controller    gabapentin (NEURONTIN) 300 MG capsule Take 1 capsule (300 mg total) by mouth 3 (three) times daily. for 10 days    ibuprofen (ADVIL,MOTRIN) 800 MG tablet Take 1 tablet (800 mg total) by mouth every 6 (six) hours as needed for Pain. (Patient not taking: Reported on 6/9/2020)    lactulose (CHRONULAC) 10 gram/15 mL solution TAKE 15 MLS BY MOUTH THREE TIMES DAILY AS NEEDED (Patient not taking: Reported on 6/9/2020)    pregabalin (LYRICA) 50 MG capsule Take 1 capsule (50 mg total) by mouth 2 (two) times daily.    tiZANidine (ZANAFLEX) 4 MG tablet Take 1 tablet (4 mg total) by mouth every 6 (six) hours as needed.    valACYclovir (VALTREX) 1000 MG tablet Take 1 tablet (1,000 mg total) by mouth 3 (three) times daily. for 7 days     No current facility-administered medications for this visit.        REVIEW OF SYSTEMS:    GENERAL:  No weight loss, malaise or fevers.  HEENT:  + headaches  NECK:  Negative for lumps, goiter, pain and significant neck swelling.  RESPIRATORY:  Negative for cough, wheezing or shortness of breath. + SHARATH  CARDIOVASCULAR:  Negative for chest pain, leg swelling or palpitations.  GI:  Negative for abdominal discomfort, blood in stools or black stools or change in bowel habits.+ GERD  MUSCULOSKELETAL:  See HPI.  SKIN:  Negative for lesions, rash, and itching.  PSYCH:  + sleep disturbance and mood disorders  HEMATOLOGY/LYMPHOLOGY:  Negative for prolonged bleeding, bruising easily or swollen nodes.  NEURO:   No h/o syncope, paralysis. + h/o of seizures  All other reviewed and negative other than HPI.    OBJECTIVE:    /87   Pulse 68   Temp 98.5 °F (36.9 °C)   Resp 18   Ht 5' 4" (1.626 m)   Wt (!) 151 kg (332 lb 14.3 oz)   SpO2 100%   BMI 57.14 kg/m²     PHYSICAL EXAMINATION:    General appearance: Well appearing, in no acute distress, " alert and oriented x3.  Psych:  Mood and affect appropriate.  Skin: Skin color, texture, turgor normal, no rashes or lesions, in both upper and lower body.  Head/face:  Normocephalic, atraumatic. No palpable lymph nodes.  Neck: No pain to palpation over the cervical paraspinous muscles. Spurling Negative. No pain with neck flexion, extension, or lateral flexion.   Cor: RRR  Pulm: non-labored breathing  GI:  Soft and non-tender.  Difficult exam due to diffuse pain.  Back: Diffusely ttp over lower back and legs. Any ROM reproduced pain.  Extremities: Peripheral joint ROM is full and pain free without obvious instability or laxity in all four extremities. No deformities, edema, or skin discoloration. Good capillary refill.  Musculoskeletal: Bilateral upper and lower extremity strength is normal and symmetric except for weakness in left ADF>EHL and APF.  No atrophy or tone abnormalities are noted.  Neuro: Bilateral upper and lower extremity coordination and muscle stretch reflexes are physiologic and symmetric except Achilles 0/4 bilatrally.  Plantar response are downgoing. + abnormal sensation in legs full length.  Gait: normal.    ASSESSMENT: 49 y.o. year old female with low back and leg pain, consistent with lumbar radiculopathy     1. Tobacco use disorder     2. Chronic midline low back pain with bilateral sciatica  Ambulatory referral/consult to Pain Clinic    Ambulatory referral/consult to Physical/Occupational Therapy   3. Lumbar radiculopathy     4. Diabetic polyneuropathy associated with type 2 diabetes mellitus     5. Myofascial pain     6. Fibromyalgia           PLAN:     - I have stressed the importance of physical activity and a home exercise plan to help with pain and improve health.  - L4/L5 bilateral TFESI planned. Informed consent obtained today.  - Start Lyrica 50 mg and gradually increase to twice a day to help with the neuropathic pain.  She has tried and failed gabapentin in the past.  - Aquatherapy  for chronic low back pain  - Refer to functional restoration program/Chronic pain education.  - RTC for procedure, then 2 week f/u with [facility]LE[facility].  - Counseled patient regarding the importance of smoking cessation and relationship with pain.    The above plan and management options were discussed at length with patient. Patient is in agreement with the above and verbalized understanding. It will be communicated with the referring physician via electronic record, fax, or mail.    Lewis Bates  06/09/2020     I have reviewed and concur with the resident's history, physical, assessment, and plan.  I have personally interviewed and examined the patient at bedside.  See below addendum for my evaluation and additional findings.  49-year-old female with chronic lower back pain consistent with lumbar radiculopathy and lumbar spondylosis.  We will schedule her for bilateral L4/L5 transforaminal epidural steroid injection.  We will also refer her to physical therapy/aquatic therapy and refer to functional restoration program/chronic pain medication.  Will follow up with her 2 weeks after the procedure.  She has tried gabapentin in the past with no relief.  We will start her on Lyrica 50 mg at bedtime and increase gradually to 50 mg b.i.d.    Lawrence Marin MD

## 2020-06-11 ENCOUNTER — OFFICE VISIT (OUTPATIENT)
Dept: PAIN MEDICINE | Facility: OTHER | Age: 50
End: 2020-06-11
Payer: MEDICARE

## 2020-06-11 ENCOUNTER — CLINICAL SUPPORT (OUTPATIENT)
Dept: REHABILITATION | Facility: HOSPITAL | Age: 50
End: 2020-06-11
Attending: ANESTHESIOLOGY
Payer: MEDICARE

## 2020-06-11 DIAGNOSIS — M79.7 FIBROMYALGIA: ICD-10-CM

## 2020-06-11 DIAGNOSIS — M53.86 DECREASED RANGE OF MOTION OF LUMBAR SPINE: ICD-10-CM

## 2020-06-11 DIAGNOSIS — R29.898 WEAKNESS OF BOTH LOWER EXTREMITIES: ICD-10-CM

## 2020-06-11 DIAGNOSIS — G89.4 CHRONIC PAIN DISORDER: Primary | ICD-10-CM

## 2020-06-11 DIAGNOSIS — M54.41 CHRONIC MIDLINE LOW BACK PAIN WITH BILATERAL SCIATICA: ICD-10-CM

## 2020-06-11 DIAGNOSIS — Z78.9 IMPAIRED INSTRUMENTAL ACTIVITIES OF DAILY LIVING (IADL): ICD-10-CM

## 2020-06-11 DIAGNOSIS — R45.86 MOOD DISTURBANCE: ICD-10-CM

## 2020-06-11 DIAGNOSIS — M79.672 LEFT FOOT PAIN: ICD-10-CM

## 2020-06-11 DIAGNOSIS — G89.29 CHRONIC MIDLINE LOW BACK PAIN WITH BILATERAL SCIATICA: ICD-10-CM

## 2020-06-11 DIAGNOSIS — G89.29 CHRONIC BILATERAL LOW BACK PAIN WITH BILATERAL SCIATICA: ICD-10-CM

## 2020-06-11 DIAGNOSIS — M48.061 SPINAL STENOSIS OF LUMBAR REGION, UNSPECIFIED WHETHER NEUROGENIC CLAUDICATION PRESENT: ICD-10-CM

## 2020-06-11 DIAGNOSIS — M54.42 CHRONIC MIDLINE LOW BACK PAIN WITH BILATERAL SCIATICA: ICD-10-CM

## 2020-06-11 DIAGNOSIS — E11.42 DIABETIC POLYNEUROPATHY ASSOCIATED WITH TYPE 2 DIABETES MELLITUS: ICD-10-CM

## 2020-06-11 DIAGNOSIS — M72.2 PLANTAR FASCIITIS OF LEFT FOOT: ICD-10-CM

## 2020-06-11 DIAGNOSIS — G47.9 SLEEP DISTURBANCE: ICD-10-CM

## 2020-06-11 DIAGNOSIS — M54.16 LUMBAR RADICULOPATHY: ICD-10-CM

## 2020-06-11 DIAGNOSIS — M54.41 CHRONIC BILATERAL LOW BACK PAIN WITH BILATERAL SCIATICA: ICD-10-CM

## 2020-06-11 DIAGNOSIS — F17.200 TOBACCO USE DISORDER: ICD-10-CM

## 2020-06-11 DIAGNOSIS — M54.42 CHRONIC BILATERAL LOW BACK PAIN WITH BILATERAL SCIATICA: ICD-10-CM

## 2020-06-11 DIAGNOSIS — Z78.9 DECREASED ACTIVITIES OF DAILY LIVING (ADL): ICD-10-CM

## 2020-06-11 DIAGNOSIS — E66.9 OBESITY, UNSPECIFIED CLASSIFICATION, UNSPECIFIED OBESITY TYPE, UNSPECIFIED WHETHER SERIOUS COMORBIDITY PRESENT: ICD-10-CM

## 2020-06-11 PROCEDURE — 99499 UNLISTED E&M SERVICE: CPT | Mod: HCNC,,, | Performed by: NURSE PRACTITIONER

## 2020-06-11 PROCEDURE — 99215 OFFICE O/P EST HI 40 MIN: CPT | Mod: HCNC,,, | Performed by: NURSE PRACTITIONER

## 2020-06-11 PROCEDURE — 3044F HG A1C LEVEL LT 7.0%: CPT | Mod: HCNC,CPTII,, | Performed by: NURSE PRACTITIONER

## 2020-06-11 PROCEDURE — 97110 THERAPEUTIC EXERCISES: CPT | Mod: HCNC,PN

## 2020-06-11 PROCEDURE — 99499 RISK ADDL DX/OHS AUDIT: ICD-10-PCS | Mod: HCNC,,, | Performed by: NURSE PRACTITIONER

## 2020-06-11 PROCEDURE — 97161 PT EVAL LOW COMPLEX 20 MIN: CPT | Mod: HCNC,PN

## 2020-06-11 PROCEDURE — 99215 PR OFFICE/OUTPT VISIT, EST, LEVL V, 40-54 MIN: ICD-10-PCS | Mod: HCNC,,, | Performed by: NURSE PRACTITIONER

## 2020-06-11 PROCEDURE — 3044F PR MOST RECENT HEMOGLOBIN A1C LEVEL <7.0%: ICD-10-PCS | Mod: HCNC,CPTII,, | Performed by: NURSE PRACTITIONER

## 2020-06-11 NOTE — PROGRESS NOTES
Chronic Pain Education   NP Note- Visit #1    Subjective:       Chief Complaint Requiring Rehabilitation: chronic Pain    Consulted by: Dr. Marin (Pain Management)    Measures obtained:  PDI: 65/70  PCS: 37/52  Promis-29:     Yanni Kaiser is a 49 y.o. F who presents today with chronic pain and was referred by Dr. Marin for Chronic Pain Education (CPE). CPE was developed to enhance patients' understanding of their pain and its impact on health and to provide ongoing chronic pain self-management counseling, education, and support.     Yanni Kaiser has a history of chronic pain for many years. She denies an inciting event or trauma. She has a PMH of DM, chronic bronchitis, GERD, asthma, pseudotumor cerebri (2002), seizures (2002), migraines, sleep apnea (no CPAP). Per review of Rheumatology and Pain Mgmt notes pt has been dx with Fibromyalgia. She also has lumbar stenosis with moderate central and foraminal narrowing at L4-5 per MRI report (5/20/20). She is scheduled for a L-NAKITA with Dr. Marin on 6/24/20. Dr. Marin also referred her to outpatient PT and she has her initial eval today at 1:00 pm.     She also has a hx of left foot pain and is s/p Plantar Fasciotomy surgery 11/2019 with Dr. Haven DPM. Last visit with him was 4/20/20 where she was given percocet, referred to Rheumatology for chronic pain syndrome and advised to apply voltaren gel and massage foot with frozen water bottle.     She is followed by Bariatrics for weight loss and is taking medication to help with this. Education with regard to diet and exercise has also been stressed.     HEP:  None    Physical Therapy:  Scheduled to start today.     Pain Medications:         · Currently taking: lyrica, tizanidine, effexor, topamax, imitrex    · Has tried in the past: norco, percocet, tramadol, ibuprofen, mobic, neurontin, flexeril, voltaren gel    Interventional Procedures:  Scheduled for L-NAKITA 6/24/20 (Dr. Marin)    Relevant  Surgeries:  none    Affecting sleep?  Yes    Affecting daily activities?  Yes    Affecting mood?  Yes     Work status:  Disabled 2/2 seizures/pseudotumor cerebri     Pt does smoke cigarettes daily. Denies hx of substance abuse.       Past Medical History:   Diagnosis Date    Allergy     Anemia     Asthma     Back pain     Chronic bronchitis     Cigarette smoker     DDD (degenerative disc disease), lumbar 2020    Depression     Diabetes with neurologic complications     DUB (dysfunctional uterine bleeding) 10/16/2018    GERD (gastroesophageal reflux disease)     High cholesterol     Hyperprolactinemia     Hypertension     Influenza A 2020    Obese body habitus     Pseudotumor cerebri     Renal manifestation of secondary diabetes mellitus     Respiratory failure     Seizures     Simple endometrial hyperplasia 2018    Sleep apnea     Smoker     Tobacco dependence        Past Surgical History:   Procedure Laterality Date    BREAST CYST ASPIRATION       SECTION      X 1    CHOLECYSTECTOMY      COLONOSCOPY      COLONOSCOPY N/A 2019    Procedure: COLONOSCOPY;  Surgeon: Jagruti wSeeney MD;  Location: [facility] ([address] [place] [address]);  Service: Endoscopy;  Laterality: N/A;  BMI is 63/gastroparesis/3 days full liquid diet 1 day clear liquid see telephone encounter by Ciara Brown     ESOPHAGEAL MANOMETRY WITH MEASUREMENT OF IMPEDANCE N/A 2019    Procedure: MANOMETRY-ESOPHAGEAL-WITH IMPEDANCE;  Surgeon: Jagruti Sweeney MD;  Location: [facility] ([address] [place] [address]);  Service: Endoscopy;  Laterality: N/A;  Hold Narcotics x 1 days   Hold TCA x 1 days  Propofol only. No fentanyl or benzodiazepine during sedation. If additional sedation needed, discuss with Dr. Sweeney.  10/29 - pt confirmed appt    ESOPHAGOGASTRODUODENOSCOPY N/A 2019    Procedure: EGD (ESOPHAGOGASTRODUODENOSCOPY);  Surgeon: Jagruti Sweeney MD;  Location: [facility] ([address] [place] [address]);  Service: Endoscopy;  Laterality: N/A;   BMI is 63/gastroparesis/3 days full liquid diet 1 day clear liquid see telephone encounter by Ciara Brown     ESOPHAGOGASTRODUODENOSCOPY N/A 11/5/2019    Procedure: ESOPHAGOGASTRODUODENOSCOPY (EGD);  Surgeon: Jagruti Sweeney MD;  Location: [facility] (2ND FLR);  Service: Endoscopy;  Laterality: N/A;  EGD with EndoFlip /+/- Botox  2nd floor for gastroparesis/BMI 63 **367lbs**  Bariatric Stretcher needed  Manometry probe to be placed endoscopically during EGD procedure  Due to change in protocol, Full liquid diet for 3 days/ 1 day of clear liquids  Hi    FOOT FRACTURE SURGERY Left     HYSTEROSCOPY WITH DILATION AND CURETTAGE OF UTERUS N/A 10/16/2018    K[facility]    PLANTAR FASCIOTOMY Left 11/18/2019    Procedure: FASCIOTOMY, PLANTAR;  Surgeon: Valentino Orourke DPM;  Location: [facility] OR [address] [place] [address];  Service: Podiatry;  Laterality: Left;    RETINAL LASER PROCEDURE Left     SINUS SURGERY      UPPER GASTROINTESTINAL ENDOSCOPY         Review of patient's allergies indicates:  No Known Allergies    Current Outpatient Medications   Medication Sig Dispense Refill    acetaZOLAMIDE (DIAMOX) 500 mg CpSR Take 1 capsule (500 mg total) by mouth 2 (two) times daily. 360 capsule 1    albuterol (PROVENTIL/VENTOLIN HFA) 90 mcg/actuation inhaler Inhale 2 puffs into the lungs every 6 (six) hours as needed. Rescue 54 g 0    albuterol-ipratropium (DUO-NEB) 2.5 mg-0.5 mg/3 mL nebulizer solution Take 3 mLs by nebulization every 6 (six) hours as needed for Wheezing or Shortness of Breath. Rescue 100 mL 3    atorvastatin (LIPITOR) 40 MG tablet Take 1 tablet (40 mg total) by mouth once daily. (Patient not taking: Reported on 6/9/2020) 90 tablet 3    blood sugar diagnostic Strp 1 each by Misc.(Non-Drug; Combo Route) route 4 (four) times daily before meals and nightly. ACCU CHEK ELVIA PLUS METER 200 each 3    blood-glucose meter (ACCU-CHEK ELVIA PLUS METER) Misc TEST FOUR TIMES DAILY BEFORE MEALS AND EVERY NIGHT 90 each 1     budesonide-formoterol 160-4.5 mcg (SYMBICORT) 160-4.5 mcg/actuation HFAA Inhale 2 puffs into the lungs every 12 (twelve) hours. Controller 1 Inhaler 5    desloratadine (CLARINEX) 5 mg tablet Take 1 tablet (5 mg total) by mouth once daily. 30 tablet 11    diclofenac sodium (VOLTAREN) 1 % Gel Apply 2 g topically once daily. 100 g 3    fluticasone propionate (FLONASE) 50 mcg/actuation nasal spray 1 spray (50 mcg total) by Each Nare route once daily. 15.8 mL 2    gabapentin (NEURONTIN) 300 MG capsule Take 1 capsule (300 mg total) by mouth 3 (three) times daily. for 10 days 30 capsule 0    ibuprofen (ADVIL,MOTRIN) 800 MG tablet Take 1 tablet (800 mg total) by mouth every 6 (six) hours as needed for Pain. (Patient not taking: Reported on 6/9/2020) 20 tablet 0    ketorolac (TORADOL) 10 mg tablet Take 1 tablet (10 mg total) by mouth every 6 (six) hours. 15 tablet 00    lactulose (CHRONULAC) 10 gram/15 mL solution TAKE 15 MLS BY MOUTH THREE TIMES DAILY AS NEEDED (Patient not taking: Reported on 6/9/2020) 473 mL 6    lancets (ACCU-CHEK SOFTCLIX LANCETS) Misc 1 Device by Misc.(Non-Drug; Combo Route) route 4 (four) times daily. 200 each 3    lidocaine (LIDODERM) 5 % Place 1 patch onto the skin once daily. Remove & Discard patch within 12 hours or as directed by MD 15 patch 0    losartan (COZAAR) 100 MG tablet TAKE 1 TABLET(100 MG) BY MOUTH EVERY DAY 90 tablet 0    metFORMIN (GLUCOPHAGE-XR) 500 MG XR 24hr tablet TAKE 2 TABLETS(1000 MG) BY MOUTH TWICE DAILY WITH MEALS 360 tablet 0    ondansetron (ZOFRAN) 8 MG tablet Take 1 tablet (8 mg total) by mouth every 8 (eight) hours as needed for Nausea. 90 tablet 5    oxyCODONE-acetaminophen (PERCOCET) 7.5-325 mg per tablet Take 1 tablet by mouth every 12 (twelve) hours as needed for Pain. 20 tablet 0    pantoprazole (PROTONIX) 40 MG tablet Take 40 mg by mouth once daily.   3    pregabalin (LYRICA) 50 MG capsule Take 1 capsule (50 mg total) by mouth 2 (two) times daily. 60  capsule 0    prucalopride 2 mg Tab Take 1 tablet by mouth once daily. 90 tablet 3    QUEtiapine (SEROQUEL) 25 MG Tab Take 1 tablet (25 mg total) by mouth once daily. 90 tablet 0    semaglutide (OZEMPIC) 1 mg/dose (2 mg/1.5 mL) PnIj Inject 1 mg subq weekly. 9 mL 1    sumatriptan (IMITREX) 50 MG tablet Take 1 tab at onset of headaches.  If no improvement in 2 hours, take another.  Do not take more than 2 tabs in 24 hours. 9 tablet 5    tiZANidine (ZANAFLEX) 4 MG tablet Take 1 tablet (4 mg total) by mouth every 6 (six) hours as needed. 30 tablet 0    topiramate (TOPAMAX) 100 MG tablet TAKE 3 TABLETS BY MOUTH TWICE DAILY 180 tablet 11    valACYclovir (VALTREX) 1000 MG tablet Take 1 tablet (1,000 mg total) by mouth 3 (three) times daily. for 7 days 21 tablet 0    venlafaxine (EFFEXOR-XR) 75 MG 24 hr capsule TAKE 1 CAPSULE(75 MG) BY MOUTH EVERY EVENING 90 capsule 3     No current facility-administered medications for this visit.        Family History   Problem Relation Age of Onset    Hypertension Mother     Diabetes Mother     Colon cancer Mother 50    Colon polyps Mother     Heart disease Father     COPD Father     Heart attack Father     Hypertension Father     Ulcers Father     Cancer Maternal Grandmother     Breast cancer Maternal Grandmother     Colon cancer Maternal Grandmother     Colon polyps Maternal Grandmother     No Known Problems Paternal Grandmother     No Known Problems Brother     No Known Problems Maternal Aunt     No Known Problems Maternal Uncle     No Known Problems Paternal Aunt     No Known Problems Paternal Uncle     No Known Problems Maternal Grandfather     No Known Problems Paternal Grandfather     Celiac disease Neg Hx     Cirrhosis Neg Hx     Crohn's disease Neg Hx     Cystic fibrosis Neg Hx     Esophageal cancer Neg Hx     Hemochromatosis Neg Hx     Inflammatory bowel disease Neg Hx     Irritable bowel syndrome Neg Hx     Liver cancer Neg Hx     Liver  disease Neg Hx     Rectal cancer Neg Hx     Stomach cancer Neg Hx     Ulcerative colitis Neg Hx     Jonnie's disease Neg Hx     Tuberculosis Neg Hx     Lymphoma Neg Hx     Scleroderma Neg Hx     Rheum arthritis Neg Hx     Melanoma Neg Hx     Multiple sclerosis Neg Hx     Psoriasis Neg Hx     Lupus Neg Hx     Skin cancer Neg Hx     Amblyopia Neg Hx     Blindness Neg Hx     Cataracts Neg Hx     Glaucoma Neg Hx     Macular degeneration Neg Hx     Retinal detachment Neg Hx     Strabismus Neg Hx     Stroke Neg Hx     Thyroid disease Neg Hx        Social History     Socioeconomic History    Marital status:      Spouse name: Not on file    Number of children: Not on file    Years of education: Not on file    Highest education level: Not on file   Occupational History    Not on file   Social Needs    Financial resource strain: Very hard    Food insecurity:     Worry: Often true     Inability: Often true    Transportation needs:     Medical: No     Non-medical: No   Tobacco Use    Smoking status: Current Every Day Smoker     Packs/day: 1.00     Years: 27.00     Pack years: 27.00     Types: Cigarettes     Start date: 1/1/1992    Smokeless tobacco: Never Used    Tobacco comment: 1/2 a pack if her days are good   Substance and Sexual Activity    Alcohol use: Yes     Alcohol/week: 0.0 standard drinks     Frequency: Never     Comment: socially    Drug use: No    Sexual activity: Yes     Partners: Male     Birth control/protection: None   Lifestyle    Physical activity:     Days per week: Not on file     Minutes per session: Not on file    Stress: Not on file   Relationships    Social connections:     Talks on phone: More than three times a week     Gets together: Three times a week     Attends Gnosticist service: More than 4 times per year     Active member of club or organization: Yes     Attends meetings of clubs or organizations: More than 4 times per year     Relationship status:     Other Topics Concern    Not on file   Social History Narrative    Not on file              Objective:       Objective:     GEN: fully alert, oriented. Well developed, well nourished. No acute distress.   HEENT: No trauma. Mucous membranes moist. Nares patent bilaterally.  PSYCH: Normal affect. Thought content appropriate.  CHEST: Breathing symmetric. No audible wheezing.  SKIN: Warm, pink, dry. No rash on exposed areas.   EXT: No cyanosis, clubbing, or edema. No color change or changes in nail or hair growth  Gait: pt able to walk independently without assistive device or support.           Imaging:    CLINICAL HISTORY:  spinal stenosis; Lumbago with sciatica, right side     TECHNIQUE:  Multiplanar, multisequence MR images were acquired from the thoracolumbar junction to the sacrum without contrast.     COMPARISON:  MRI lumbar spine 08/28/2018     FINDINGS:  Straightening of the normal lumbar lordosis.  Mild grade 1 retrolisthesis L5 on S1     Vertebral body heights are maintained.  Several T1 and T2 hyperintense lesions within the L2 and L3 vertebral body favored to represent osseous hemangiomas.  Otherwise normal marrow signal.  No fracture.     Multilevel partial disc desiccation throughout the lumbar spine.  Posterior annular fissuring at L2-L3.     Distal spinal cord is unremarkable.  Conus terminates at L1-L2.     Degenerative findings:     T12-L1: No disc herniation, spinal canal stenosis, or neural foraminal narrowing.     L1-L2: No disc herniation, spinal canal stenosis, or neural foraminal narrowing.     L2-L3: No disc herniation, spinal canal stenosis or neural foraminal narrowing.     L3-L4: Mild circumferential disc bulge without spinal canal stenosis or neural foraminal narrowing.     L4-L5: Circumferential disc bulge, facet arthropathy, and ligamentum flavum hypertrophy resulting in moderate spinal canal stenosis and moderate bilateral neural foraminal narrowing.     L5-S1: Circumferential  disc bulge and facet arthropathy without spinal canal stenosis or neural foraminal narrowing.     Paraspinal muscles and soft tissues are unremarkable.     Impression:     Multilevel lumbar spondylosis most prominent at L4-5 resulting in moderate spinal canal stenosis and moderate bilateral neural foraminal narrowing.  Findings have slightly progressed in comparison to prior study dated 08/28/2018.     Electronically signed by resident: Fady Reese  Date:                                            05/22/2020  Time:                                           08:27     Electronically signed by: Abelardo Gold MD  Date:                                            05/22/2020  Time:                                           08:57  EXAMINATION:  MRI FOOT (HINDFOOT) LEFT W W/O CONTRAST     CLINICAL HISTORY:  Foot pain, chronic, plantar fasciitis suspected;  Pain in left foot     TECHNIQUE:  Routine MRI evaluation of the left hindfoot performed with and without the use of 10 cc of Gadavist IV contrast.     COMPARISON:  Radiograph 09/06/2019.     FINDINGS:  Tendons: There is fusiform thickening of the distal 5.4 cm of the Achilles tendon with low-grade partial-thickness interstitial tearing of the anterior distal fibers and associated peritendinitis.  Note is made of a 0.4 cm fluid filled gap within the anteromedial fibers of the distal Achilles tendon corresponding to aforementioned interstitial tear.  There is trace fluid within the peroneal tendon sheath suggesting tenosynovitis.  Posterior tibial, flexor digitorum longus, flexor hallucis longus and extensor tendons are normal.     Ligaments: There is slight irregularity at the fibular insertion of the anterior talofibular ligament, presumably related to remote trauma.  Posterior talofibular, calcaneofibular, deltoid, spring and visualized portions of the Lisfranc ligament are normal.  Bifurcate, dorsal talonavicular and dorsal calcaneocuboid ligaments are  intact.     Bones: No fractures.  No avascular necrosis.  No marrow infiltrative process.     Cartilage: No osteochondral lesions.  No partial or full-thickness cartilage defects.  No imaging findings to suggest a tarsal coalition.     Muscles: No accessory muscles.  Muscle bulk is preserved.     Miscellaneous: There is thickening and increased signal intensity of the central cord of the plantar fascia with associated edema of the calcaneus, flexor digitorum brevis and adjacent heel pad.  No full-thickness tear or retraction.  Tarsal tunnel is normal.  Sinus tarsi demonstrates slight increased signal intensity with grossly intact cervical and interosseous ligaments.  There is trace fluid within the retrocalcaneal bursa.     Impression:     1. MR imaging findings consistent with plantar fasciitis noting thickening and increased signal intensity within the central cord of the plantar fascia with associated edema of the flexor digitorum brevis, calcaneus and adjacent heel pad.  2. Distal Achilles tendinosis with low-grade interstitial tearing and associated peritendinitis.  Trace fluid within the retrocalcaneal bursa suggests bursitis.  3. Trace peroneal tenosynovitis.  4. Irregularity at the fibular insertion of the anterior talofibular ligament suggesting sequela of a remote injury.  No full-thickness tear.        Electronically signed by: Chapin Singleton MD  Date:                                            09/12/2019  Time:                                           13:11          Assessment:     Encounter Diagnoses   Name Primary?    Chronic pain disorder Yes    Fibromyalgia     Impaired instrumental activities of daily living (IADL)     Decreased activities of daily living (ADL)     Lumbar radiculopathy     Spinal stenosis of lumbar region, unspecified whether neurogenic claudication present     Tobacco use disorder     Diabetic polyneuropathy associated with type 2 diabetes mellitus     Left foot pain      Plantar fasciitis of left foot     Obesity, unspecified classification, unspecified obesity type, unspecified whether serious comorbidity present     Mood disturbance     Sleep disturbance        Plan:     Diagnoses and all orders for this visit:    Chronic pain disorder    Fibromyalgia    Impaired instrumental activities of daily living (IADL)    Decreased activities of daily living (ADL)    Lumbar radiculopathy    Spinal stenosis of lumbar region, unspecified whether neurogenic claudication present    Tobacco use disorder    Diabetic polyneuropathy associated with type 2 diabetes mellitus    Left foot pain    Plantar fasciitis of left foot    Obesity, unspecified classification, unspecified obesity type, unspecified whether serious comorbidity present    Mood disturbance    Sleep disturbance      Yanni Kaiser is a 49 y.o. F with a long-standing history of chronic pain that has impacted her function, mood, and sleep. Her quality of life has suffered as a result. She has lumbar stenosis for which she is followed by Pain Mgmt and will be having injection soon. She is followed by Podiatry for left foot pain and is s/p left plantar fasciectomy last year with some relief but still with some pain. She is followed by Bariatrics for obesity and is taking medication to help with weight loss and has been given instructions with regard to diet and exercise.     Extensive education and counseling was provided today about pain (basic neuroscience and acute vs. Chronic pain) in order to enhance understanding of her condition to increase compliance with both medical and lifestyle/behavioral pain management recommendations. We discussed the importance of taking a self-management and proactive approach to managing chronic pain with a focus on increasing physical activity and optimizing emotional health.     We discussed that at our next visit we will work together to set goals that aim to improve her physical  function and emotional health in order to help her with her chronic pain and chronic pain coping skills. After the second visit, we will continue to meet in person or virtually for continued support.     I spent a total of 60 minutes with the patient, and greater than 50% of the time was spent in counseling and education.     The above plan and management options were discussed at length with patient. Patient is in agreement with the above and verbalized understanding. It will be communicated with the referring physician via electronic record, fax, or mail.

## 2020-06-12 ENCOUNTER — OFFICE VISIT (OUTPATIENT)
Dept: FAMILY MEDICINE | Facility: CLINIC | Age: 50
End: 2020-06-12
Payer: MEDICARE

## 2020-06-12 ENCOUNTER — TELEPHONE (OUTPATIENT)
Dept: PAIN MEDICINE | Facility: OTHER | Age: 50
End: 2020-06-12

## 2020-06-12 VITALS
BODY MASS INDEX: 50.02 KG/M2 | SYSTOLIC BLOOD PRESSURE: 129 MMHG | RESPIRATION RATE: 17 BRPM | OXYGEN SATURATION: 97 % | HEART RATE: 69 BPM | TEMPERATURE: 97 F | HEIGHT: 64 IN | WEIGHT: 293 LBS | DIASTOLIC BLOOD PRESSURE: 83 MMHG

## 2020-06-12 DIAGNOSIS — G89.29 CHRONIC BILATERAL LOW BACK PAIN WITH BILATERAL SCIATICA: ICD-10-CM

## 2020-06-12 DIAGNOSIS — Z72.0 TOBACCO ABUSE: ICD-10-CM

## 2020-06-12 DIAGNOSIS — N18.30 TYPE 2 DIABETES MELLITUS WITH STAGE 3 CHRONIC KIDNEY DISEASE, WITH LONG-TERM CURRENT USE OF INSULIN: ICD-10-CM

## 2020-06-12 DIAGNOSIS — M54.41 CHRONIC BILATERAL LOW BACK PAIN WITH BILATERAL SCIATICA: ICD-10-CM

## 2020-06-12 DIAGNOSIS — E78.2 MIXED HYPERLIPIDEMIA: Chronic | ICD-10-CM

## 2020-06-12 DIAGNOSIS — E11.22 TYPE 2 DIABETES MELLITUS WITH STAGE 3 CHRONIC KIDNEY DISEASE, WITH LONG-TERM CURRENT USE OF INSULIN: ICD-10-CM

## 2020-06-12 DIAGNOSIS — Z79.4 TYPE 2 DIABETES MELLITUS WITH STAGE 3 CHRONIC KIDNEY DISEASE, WITH LONG-TERM CURRENT USE OF INSULIN: ICD-10-CM

## 2020-06-12 DIAGNOSIS — M54.42 CHRONIC BILATERAL LOW BACK PAIN WITH BILATERAL SCIATICA: ICD-10-CM

## 2020-06-12 DIAGNOSIS — E66.01 MORBID OBESITY WITH BMI OF 50.0-59.9, ADULT: ICD-10-CM

## 2020-06-12 DIAGNOSIS — I10 ESSENTIAL HYPERTENSION: Primary | Chronic | ICD-10-CM

## 2020-06-12 PROCEDURE — 99214 OFFICE O/P EST MOD 30 MIN: CPT | Mod: HCNC,S$GLB,, | Performed by: FAMILY MEDICINE

## 2020-06-12 PROCEDURE — 99999 PR PBB SHADOW E&M-EST. PATIENT-LVL V: ICD-10-PCS | Mod: PBBFAC,HCNC,, | Performed by: FAMILY MEDICINE

## 2020-06-12 PROCEDURE — 3044F PR MOST RECENT HEMOGLOBIN A1C LEVEL <7.0%: ICD-10-PCS | Mod: HCNC,CPTII,S$GLB, | Performed by: FAMILY MEDICINE

## 2020-06-12 PROCEDURE — 3044F HG A1C LEVEL LT 7.0%: CPT | Mod: HCNC,CPTII,S$GLB, | Performed by: FAMILY MEDICINE

## 2020-06-12 PROCEDURE — 99999 PR PBB SHADOW E&M-EST. PATIENT-LVL V: CPT | Mod: PBBFAC,HCNC,, | Performed by: FAMILY MEDICINE

## 2020-06-12 PROCEDURE — 3074F PR MOST RECENT SYSTOLIC BLOOD PRESSURE < 130 MM HG: ICD-10-PCS | Mod: HCNC,CPTII,S$GLB, | Performed by: FAMILY MEDICINE

## 2020-06-12 PROCEDURE — 3074F SYST BP LT 130 MM HG: CPT | Mod: HCNC,CPTII,S$GLB, | Performed by: FAMILY MEDICINE

## 2020-06-12 PROCEDURE — 99214 PR OFFICE/OUTPT VISIT, EST, LEVL IV, 30-39 MIN: ICD-10-PCS | Mod: HCNC,S$GLB,, | Performed by: FAMILY MEDICINE

## 2020-06-12 PROCEDURE — 99499 UNLISTED E&M SERVICE: CPT | Mod: HCNC,S$GLB,, | Performed by: FAMILY MEDICINE

## 2020-06-12 PROCEDURE — 99499 RISK ADDL DX/OHS AUDIT: ICD-10-PCS | Mod: HCNC,S$GLB,, | Performed by: FAMILY MEDICINE

## 2020-06-12 PROCEDURE — 3008F PR BODY MASS INDEX (BMI) DOCUMENTED: ICD-10-PCS | Mod: HCNC,CPTII,S$GLB, | Performed by: FAMILY MEDICINE

## 2020-06-12 PROCEDURE — 3079F DIAST BP 80-89 MM HG: CPT | Mod: HCNC,CPTII,S$GLB, | Performed by: FAMILY MEDICINE

## 2020-06-12 PROCEDURE — 3079F PR MOST RECENT DIASTOLIC BLOOD PRESSURE 80-89 MM HG: ICD-10-PCS | Mod: HCNC,CPTII,S$GLB, | Performed by: FAMILY MEDICINE

## 2020-06-12 PROCEDURE — 3008F BODY MASS INDEX DOCD: CPT | Mod: HCNC,CPTII,S$GLB, | Performed by: FAMILY MEDICINE

## 2020-06-12 RX ORDER — HYDROCODONE BITARTRATE AND ACETAMINOPHEN 5; 325 MG/1; MG/1
1 TABLET ORAL EVERY 8 HOURS PRN
Qty: 21 TABLET | Refills: 0 | Status: SHIPPED | OUTPATIENT
Start: 2020-06-12 | End: 2020-07-14

## 2020-06-15 ENCOUNTER — TELEPHONE (OUTPATIENT)
Dept: BARIATRICS | Facility: CLINIC | Age: 50
End: 2020-06-15

## 2020-06-16 NOTE — PROGRESS NOTES
Subjective:       Patient ID: Yanni Kaiser is a 49 y.o. female.    Chief Complaint: No chief complaint on file.    The patient location is: . home  The chief complaint leading to consultation is: Patient presents with:  Follow-up       Visit type: audiovisual    Face to Face time with patient: 10 min  17 minutes of total time spent on the encounter, which includes face to face time and non-face to face time preparing to see the patient (eg, review of tests), Obtaining and/or reviewing separately obtained history, Documenting clinical information in the electronic or other health record, Independently interpreting results (not separately reported) and communicating results to the patient/family/caregiver, or Care coordination (not separately reported).         Each patient to whom he or she provides medical services by telemedicine is:  (1) informed of the relationship between the physician and patient and the respective role of any other health care provider with respect to management of the patient; and (2) notified that he or she may decline to receive medical services by telemedicine and may withdraw from such care at any time.    Notes:  Pt here today for follow-up. Has gained 7 lbs, net neg 35 lbs. Started LCHF diet and ozempic. She is out of a low ortho boot from a foot surgery. Walking 1-2 miles a day. States her appetite is variable. State she is not eating much starch. Has given up sweet tea. Denies SE with ozempic. A1c continues to improve. She does state she had shingles. Did not have steroids.  ALso on 300 mg topiramate daily. Has had elevated BP readings as recently as 6 weeks. 2  She does feel she is eating more. Relates more to stress.   Prev 330. Last chart weight 337#.       Lab Results       Component                Value               Date                       HGBA1C                   5.3                 06/12/2020                 HGBA1C                   5.6                 11/01/2019                  HGBA1C                   6.5 (H)             03/20/2019            Lab Results       Component                Value               Date                       MICROALBUR               7.2                 04/01/2014                 LDLCALC                  144.6               06/12/2020                 CREATININE               1.3                 06/12/2020               Follow-up  Associated symptoms include arthralgias and chest pain. Pertinent negatives include no chills or fever.     Review of Systems   Constitutional: Negative for chills and fever.   Respiratory: Positive for apnea and shortness of breath.         Does not use CPAP machine. States did not like it.    Cardiovascular: Positive for chest pain and leg swelling.        With asthma sx   Gastrointestinal: Positive for constipation and diarrhea.        + GERD   Endocrine: Positive for cold intolerance.   Genitourinary: Negative for difficulty urinating and dysuria.           Musculoskeletal: Positive for arthralgias and back pain.   Neurological: Positive for dizziness and light-headedness.   Psychiatric/Behavioral: Positive for dysphoric mood. The patient is not nervous/anxious.        Objective:     There were no vitals taken for this visit.    Physical Exam  Vitals signs reviewed.   Constitutional:       General: She is not in acute distress.     Appearance: She is well-developed.      Comments: Morbidly obese     HENT:      Head: Normocephalic and atraumatic.   Pulmonary:      Effort: Pulmonary effort is normal.   Neurological:      Mental Status: She is alert and oriented to person, place, and time.      Cranial Nerves: No cranial nerve deficit.   Psychiatric:         Behavior: Behavior normal.         Judgment: Judgment normal.         Assessment:       1. Class 3 severe obesity due to excess calories with serious comorbidity and body mass index (BMI) of 50.0 to 59.9 in adult    2. Type 2 diabetes mellitus with stage 3 chronic  kidney disease, with long-term current use of insulin        Plan:         Yanni was seen today for follow-up.    Diagnoses and all orders for this visit:    Class 3 severe obesity due to excess calories with serious comorbidity and body mass index (BMI) of 50.0 to 59.9 in adult    Type 2 diabetes mellitus with stage 3 chronic kidney disease, with long-term current use of insulin  -     semaglutide (OZEMPIC) 1 mg/dose (2 mg/1.5 mL) PnIj; Inject 1 mg subq weekly.    Discussed strategies for developing new coping mechanisms and becoming more self aware of eating behavior.        Have a plan in place for coping with emotions if you get the urge to make poor food choices- Set a timer for 10-15 minutes and find a way to distract yourself. Take a walk, drink water, write down how you feel.     Eliminate temptation where possible. Limit foods available that you know you tend to overeat. Eat proteins first.       Consider keeping a food log.       Continue with Ozempic 1mg once a week.      Decrease portions as soon as you start Ozempic. Some nausea in the first 2 weeks is not unusual.     If you get pain across the upper abdomen and around to your back, please call the office.     Add some ty pe of resistance training 2-3 days a week. These can be body weight exercises, light weight or elastic bands. [employer] and [employer] are great sources for free work out plans and videos.     3 meals a day made up of the following:  Unlimited green vegetables, tomatoes, mushrooms, spaghetti squash, cauliflower, meat, poultry, seafood, eggs and hard cheeses.   Milk and plain yogurt  Dressings, seasonings, condiments, etc should have less than 2 g sugars.   Beans (1-1.5 cups) or nuts (1/4 cup) can have 1 x a day.   1-2 servings of citrus fruits, berries, pineapple or melon a day (1/2 cup)  Avoid fried foods    No sweets, grains, rice, pasta, potatoes, bread, corn, peas, oatmeal, grits, tortillas, crackers, chips    No soda, sweet tea,  juices or lemonade. All drinks should be 5 calories or less.     [email or web address] for recipes. Moderate carb intake    Quarantine tips given

## 2020-06-16 NOTE — H&P (VIEW-ONLY)
Subjective:       Patient ID: Yanni Kaiser is a 49 y.o. female.    Chief Complaint: No chief complaint on file.    The patient location is: . home  The chief complaint leading to consultation is: Patient presents with:  Follow-up       Visit type: audiovisual    Face to Face time with patient: 10 min  17 minutes of total time spent on the encounter, which includes face to face time and non-face to face time preparing to see the patient (eg, review of tests), Obtaining and/or reviewing separately obtained history, Documenting clinical information in the electronic or other health record, Independently interpreting results (not separately reported) and communicating results to the patient/family/caregiver, or Care coordination (not separately reported).         Each patient to whom he or she provides medical services by telemedicine is:  (1) informed of the relationship between the physician and patient and the respective role of any other health care provider with respect to management of the patient; and (2) notified that he or she may decline to receive medical services by telemedicine and may withdraw from such care at any time.    Notes:  Pt here today for follow-up. Has gained 7 lbs, net neg 35 lbs. Started LCHF diet and ozempic. She is out of a low ortho boot from a foot surgery. Walking 1-2 miles a day. States her appetite is variable. State she is not eating much starch. Has given up sweet tea. Denies SE with ozempic. A1c continues to improve. She does state she had shingles. Did not have steroids.  ALso on 300 mg topiramate daily. Has had elevated BP readings as recently as 6 weeks. 2  She does feel she is eating more. Relates more to stress.   Prev 330. Last chart weight 337#.       Lab Results       Component                Value               Date                       HGBA1C                   5.3                 06/12/2020                 HGBA1C                   5.6                 11/01/2019                  HGBA1C                   6.5 (H)             03/20/2019            Lab Results       Component                Value               Date                       MICROALBUR               7.2                 04/01/2014                 LDLCALC                  144.6               06/12/2020                 CREATININE               1.3                 06/12/2020               Follow-up  Associated symptoms include arthralgias and chest pain. Pertinent negatives include no chills or fever.     Review of Systems   Constitutional: Negative for chills and fever.   Respiratory: Positive for apnea and shortness of breath.         Does not use CPAP machine. States did not like it.    Cardiovascular: Positive for chest pain and leg swelling.        With asthma sx   Gastrointestinal: Positive for constipation and diarrhea.        + GERD   Endocrine: Positive for cold intolerance.   Genitourinary: Negative for difficulty urinating and dysuria.           Musculoskeletal: Positive for arthralgias and back pain.   Neurological: Positive for dizziness and light-headedness.   Psychiatric/Behavioral: Positive for dysphoric mood. The patient is not nervous/anxious.        Objective:     There were no vitals taken for this visit.    Physical Exam  Vitals signs reviewed.   Constitutional:       General: She is not in acute distress.     Appearance: She is well-developed.      Comments: Morbidly obese     HENT:      Head: Normocephalic and atraumatic.   Pulmonary:      Effort: Pulmonary effort is normal.   Neurological:      Mental Status: She is alert and oriented to person, place, and time.      Cranial Nerves: No cranial nerve deficit.   Psychiatric:         Behavior: Behavior normal.         Judgment: Judgment normal.         Assessment:       1. Class 3 severe obesity due to excess calories with serious comorbidity and body mass index (BMI) of 50.0 to 59.9 in adult    2. Type 2 diabetes mellitus with stage 3 chronic  kidney disease, with long-term current use of insulin        Plan:         Yanni was seen today for follow-up.    Diagnoses and all orders for this visit:    Class 3 severe obesity due to excess calories with serious comorbidity and body mass index (BMI) of 50.0 to 59.9 in adult    Type 2 diabetes mellitus with stage 3 chronic kidney disease, with long-term current use of insulin  -     semaglutide (OZEMPIC) 1 mg/dose (2 mg/1.5 mL) PnIj; Inject 1 mg subq weekly.    Discussed strategies for developing new coping mechanisms and becoming more self aware of eating behavior.        Have a plan in place for coping with emotions if you get the urge to make poor food choices- Set a timer for 10-15 minutes and find a way to distract yourself. Take a walk, drink water, write down how you feel.     Eliminate temptation where possible. Limit foods available that you know you tend to overeat. Eat proteins first.       Consider keeping a food log.       Continue with Ozempic 1mg once a week.      Decrease portions as soon as you start Ozempic. Some nausea in the first 2 weeks is not unusual.     If you get pain across the upper abdomen and around to your back, please call the office.     Add some ty pe of resistance training 2-3 days a week. These can be body weight exercises, light weight or elastic bands. [employer] and [employer] are great sources for free work out plans and videos.     3 meals a day made up of the following:  Unlimited green vegetables, tomatoes, mushrooms, spaghetti squash, cauliflower, meat, poultry, seafood, eggs and hard cheeses.   Milk and plain yogurt  Dressings, seasonings, condiments, etc should have less than 2 g sugars.   Beans (1-1.5 cups) or nuts (1/4 cup) can have 1 x a day.   1-2 servings of citrus fruits, berries, pineapple or melon a day (1/2 cup)  Avoid fried foods    No sweets, grains, rice, pasta, potatoes, bread, corn, peas, oatmeal, grits, tortillas, crackers, chips    No soda, sweet tea,  juices or lemonade. All drinks should be 5 calories or less.     [email or web address] for recipes. Moderate carb intake    Quarantine tips given

## 2020-06-17 ENCOUNTER — OFFICE VISIT (OUTPATIENT)
Dept: BARIATRICS | Facility: CLINIC | Age: 50
End: 2020-06-17
Payer: MEDICARE

## 2020-06-17 DIAGNOSIS — Z79.4 TYPE 2 DIABETES MELLITUS WITH STAGE 3 CHRONIC KIDNEY DISEASE, WITH LONG-TERM CURRENT USE OF INSULIN: ICD-10-CM

## 2020-06-17 DIAGNOSIS — E66.01 CLASS 3 SEVERE OBESITY DUE TO EXCESS CALORIES WITH SERIOUS COMORBIDITY AND BODY MASS INDEX (BMI) OF 50.0 TO 59.9 IN ADULT: Primary | ICD-10-CM

## 2020-06-17 DIAGNOSIS — N18.30 TYPE 2 DIABETES MELLITUS WITH STAGE 3 CHRONIC KIDNEY DISEASE, WITH LONG-TERM CURRENT USE OF INSULIN: ICD-10-CM

## 2020-06-17 DIAGNOSIS — E11.22 TYPE 2 DIABETES MELLITUS WITH STAGE 3 CHRONIC KIDNEY DISEASE, WITH LONG-TERM CURRENT USE OF INSULIN: ICD-10-CM

## 2020-06-17 PROCEDURE — 99213 PR OFFICE/OUTPT VISIT, EST, LEVL III, 20-29 MIN: ICD-10-PCS | Mod: 95,,, | Performed by: INTERNAL MEDICINE

## 2020-06-17 PROCEDURE — 3044F HG A1C LEVEL LT 7.0%: CPT | Mod: CPTII,95,, | Performed by: INTERNAL MEDICINE

## 2020-06-17 PROCEDURE — 99213 OFFICE O/P EST LOW 20 MIN: CPT | Mod: 95,,, | Performed by: INTERNAL MEDICINE

## 2020-06-17 PROCEDURE — 3044F PR MOST RECENT HEMOGLOBIN A1C LEVEL <7.0%: ICD-10-PCS | Mod: CPTII,95,, | Performed by: INTERNAL MEDICINE

## 2020-06-17 RX ORDER — SEMAGLUTIDE 1.34 MG/ML
INJECTION, SOLUTION SUBCUTANEOUS
Qty: 9 ML | Refills: 1 | Status: SHIPPED | OUTPATIENT
Start: 2020-06-17 | End: 2021-03-03 | Stop reason: SDUPTHER

## 2020-06-17 NOTE — PATIENT INSTRUCTIONS
Continue with Ozempic.     Discussed strategies for developing new coping mechanisms and becoming more self aware of eating behavior.        Have a plan in place for coping with emotions if you get the urge to make poor food choices- Set a timer for 10-15 minutes and find a way to distract yourself. Take a walk, drink water, write down how you feel.     Eliminate temptation where possible. Limit foods available that you know you tend to overeat. Eat proteins first.       Consider keeping a food log.       Add some ty pe of resistance training 2-3 days a week. These can be body weight exercises, light weight or elastic bands. [employer] and [employer] are great sources for free work out plans and videos.     3 meals a day made up of the following:  Unlimited green vegetables, tomatoes, mushrooms, spaghetti squash, cauliflower, meat, poultry, seafood, eggs and hard cheeses.   Milk and plain yogurt  Dressings, seasonings, condiments, etc should have less than 2 g sugars.   Beans (1-1.5 cups) or nuts (1/4 cup) can have 1 x a day.   1-2 servings of citrus fruits, berries, pineapple or melon a day (1/2 cup)  Avoid fried foods    No sweets, grains, rice, pasta, potatoes, bread, corn, peas, oatmeal, grits, tortillas, crackers, chips    No soda, sweet tea, juices or lemonade. All drinks should be 5 calories or less.     [email or web address] for recipes. Moderate carb intake      [facility] B[facility]ric Survival Guide  Tips to Stay on Track During COVID-19      DO DON'T   Keep up with your food log  Maintaining your caloric intake and diet quality is critical for achieving your health and weight loss goals.  Download the [employer] Tixers and use [facility] Code 26434 to track food and fluid intake Feel Discouraged - You can do this!  There are a lot of changes happening in our world but don't let them discourage you. Focus on the future and remind yourself of all the work and effort you've put in so far.     Keep  protein-rich foods stocked up  buy chicken and turkey in bulk and freeze them, keep dry or canned beans on hand, get the largest quantity of eggs available. Make sure you are getting your required protein intake (between  grams EACH DAY).   Drink fluid during meals or 30 minutes before/after eating  Your regular routine may have changed which may have caused some of your meal or snack times to change.  keep track of when you consume any liquid and time your meals accordingly. This will also help you make sure you are staying hydrated throughout the day   Continue with your protein drinks or bars  Order online or use grocery delivery service. Always make sure you order more WELL BEFORE you are running low, especially now when deliveries may take longer to arrive.  Remember to check to make sure your protein shakes or bars have 4 gms of sugar or less.     Keep table sugar around  You may be more tempted to add it to your drinks or food if it is visible and easily accessible.   Try new recipes  Use this time to experiment in the kitchen and find some different healthy dishes you enjoy - you can use the nutrition booklet to help guide you.  If you cannot find your copy, please download it from our website @ [email or web address]  Click here to download [facility]'s Nutrition Binder.   Add tough or crunchy foods back into your diet too quickly  Raw veggies are great snack foods but adding them in too quickly after surgery will cause pain and discomfort   Eat your meals slowly and intentionally  You shouldn't have to rush out to be anywhere so really take your time and aim for each meal to last around 20-30 minutes.   Drink sugary/bubbly drinks or alcohol    It may be tempting especially if you are surrounded by others without these limitations, but these beverages will prevent weight loss and cause gastric  pain/discomfort     Listen to your body - try to  recognize when you feel full.  Learning your body's signals can be difficult but it is a key step in your weight loss journey. While you are is this process of working on this step, be sure portion out the recommended quantities of foods and meals/snacks to prevent overeating.   Eat your meals using electronics  This is especially difficult at home where your use of computers, phones, and TVs are pretty much unregulated. Designate a meal spot or spots where there is limited distractions and you can focus on your food - maybe your kitchen table or on an outside porch or deck.   Continue taking vitamins and minerals  Take them at the same time each day and keep a log of when you take your supplements to make sure you don't miss any. These supplements are essential for preventing malnutrition and other health problems that will deter your progress.   'Save' your appetite   You may feel like holding out from food as long as possible so you can eat a large meal later on, but your body needs energy throughout the day in order to properly fuel itself and keep you alert.  Try eating small meals throughout the day - use these meals as mini breaks from work or projects you are doing at home.   Stay active!  It is so important for both your physical and mental health that you get regular physical activity. Even if you can no longer physically go to the gym or to workout classes there are tons of online resources to keep you moving. Incorporate a specific exercise time into your daily home routine and keep a journal of your activity.   Order food delivery or take out   Most places are now offering delivery services, but it can still be difficult to find and choose healthy options. Choose to do a grocery store  or delivery instead- cooking food at home is less expensive then eating out!   Review the resources you've been provided and keep in touch with your healthcare team.  Let them know if you are struggling or  experiencing any problems - they are here to help!  Call us to schedule a telehealth visit at [phone] Isolate yourself   It may be called 'social distancing' but that does not mean we can't still connect with one another. Phone calls or group video chats are great ways to keep in touch while staying at home. Any method of getting regular social time with friends and family will help to remind you that you're not in this alone.     Grocery List    Items to Keep Stocked in Your Kitchen  PROTEINS (Lean)    Eggs  Beans (canned and/or dried)  Skinless chicken/turkey   Tuna/[place] (canned and/or pouch)  Tofu or Tempeh  [employer] Veggie Burgers  Fish or shrimp (fresh or frozen)  Ground Beef (90% lean)  Steaks  Ch[employer]ani Greek Yogurt  Cabot Cottage Cheese  Hummus  Low-fat cheeses (Laughing Cow, Baby Bell, mozzarella string cheese)  [employer] Non-fat Milk    Vegetables (non-starchy)  *veggies can be fresh or frozen    Broccoli   Cauliflower   Carrots   Onions   Cabbage   Radishes   Zucchini   Okra   Greens   Peppers   Spinach   Turnips   Mushrooms   Tomatoes   Celery   Lettuce   Asparagus  Eggplant   Green Onions   Kale    Fruits  *Fruits can be fresh or frozen    Apples  Oranges  Pears  Kiwi  Melon  Berries  Peaches  Unsweetened Applesauce    *Avoid fruits canned In syrup  *Stick to 1-2 servings of fruit/day   Vitamins    Flintstones Complete  Chewables    Super B-Complex tablets with 50 mg Thiamine    Nature's Way liquid Calcium Citrate + Vit D     Sublingual Vitamin B12   Other    Sugar-free Popsicles    Sugar-free Jello    Crystal Lite    Low Fat Condiments     Decaf Coffee/Tea           Foods to Avoid Having Around the House  Butter/Margarine Cookies Candy Chips  Pretzels Grits  Granola Popcorn Winn Corn  Bread Alcohol Soda  Pasta  Rice  Cake/Pie Sausage Potatoes Ice Cream                 Tricks to Prevent Emotional Eating During Quarantine      Keep 'trigger foods' out of the house   Keep yourself distracted  with work, games, music, or whatever hobby you enjoy. This may be the time to try a new activity!   Try fighting stress with breathing techniques, yoga, meditation, or prayer   Mix up your meals with a variety of dishes   Keep up with your food diary   Call or video chat with a friend or family   Plan your meals for specific time and try for smaller meals throughout the day   Pre-portion all meals and snacks    Step outside for some fresh air or do a quick exercise activity to reset yourself    *Avoid negative thoughts about yourself - if you have a slip-up, you are not a failure. Forgive yourself and focus on learning from it so you can prevent it for the future              Ways to Stay Active While Staying Inside      Youtube Videos - free exercise and gym classes at any fitness level   Apps -tons of fitness apps are currently offering free trial periods and offer workouts that don't require equipment   Chores around the house, such as cleaning or gardening   Walk up and down the stairs   Video-chat with your regular workout muna and do an online class together   Make a playlist of your favorite fun songs and dance around - no need to worry about knowing any serious dance moves, just jump around get your heart rate up!    While you are limited to working out at home, you may find it easier to do short, mini workouts multiple times a day instead of all at once. Try to still get at least 30 minutes of physical activity in each day!   Physical Health = Mental Health    The health of both your mind and body are equally important -be sure you are taking the time to care for both. It is likely that the current health concerns and quarantine mandates caused significant changes to your normal routine. Although this can feel overwhelming and seem difficult to manage, there are ways you can take to manage these feelings and keep your mental and physical health journey on track. Here are some tips for  self-care during quarantine:     Meditate, take deep breaths - find any practice that will help you center yourself.   Move around your house throughout the day. Avoid staying in the same seat or room for too long and try to work in an area of your house that get lots of sunlight if possible.   Get fresh air for a bit every day - being outside is a great way to improve your mood! You don't necessarily have to go far from your house. You could even just hang out on your porch for a while or take a walk around the block.    Get good sleep and maintain a regular sleep schedule   Connect with others. While we aren't able to physically be with others right now it is still so important to socialize and interact with other people, even if it is being done remotely.    Keep yourself busy. Make a list of tasks that you want to complete around the house, start a new book, do some art projects, try journaling.

## 2020-06-18 ENCOUNTER — OFFICE VISIT (OUTPATIENT)
Dept: PAIN MEDICINE | Facility: OTHER | Age: 50
End: 2020-06-18
Payer: MEDICARE

## 2020-06-18 DIAGNOSIS — E11.42 DIABETIC POLYNEUROPATHY ASSOCIATED WITH TYPE 2 DIABETES MELLITUS: ICD-10-CM

## 2020-06-18 DIAGNOSIS — G89.4 CHRONIC PAIN DISORDER: Primary | ICD-10-CM

## 2020-06-18 DIAGNOSIS — E66.9 OBESITY, UNSPECIFIED CLASSIFICATION, UNSPECIFIED OBESITY TYPE, UNSPECIFIED WHETHER SERIOUS COMORBIDITY PRESENT: ICD-10-CM

## 2020-06-18 DIAGNOSIS — M79.7 FIBROMYALGIA: ICD-10-CM

## 2020-06-18 DIAGNOSIS — Z78.9 DECREASED ACTIVITIES OF DAILY LIVING (ADL): ICD-10-CM

## 2020-06-18 DIAGNOSIS — Z78.9 IMPAIRED INSTRUMENTAL ACTIVITIES OF DAILY LIVING (IADL): ICD-10-CM

## 2020-06-18 DIAGNOSIS — F17.200 TOBACCO USE DISORDER: ICD-10-CM

## 2020-06-18 DIAGNOSIS — M48.061 SPINAL STENOSIS OF LUMBAR REGION, UNSPECIFIED WHETHER NEUROGENIC CLAUDICATION PRESENT: ICD-10-CM

## 2020-06-18 DIAGNOSIS — G47.9 SLEEP DISTURBANCE: ICD-10-CM

## 2020-06-18 DIAGNOSIS — R45.86 MOOD DISTURBANCE: ICD-10-CM

## 2020-06-18 DIAGNOSIS — M72.2 PLANTAR FASCIITIS OF LEFT FOOT: ICD-10-CM

## 2020-06-18 DIAGNOSIS — M79.672 LEFT FOOT PAIN: ICD-10-CM

## 2020-06-18 DIAGNOSIS — M54.16 LUMBAR RADICULOPATHY: ICD-10-CM

## 2020-06-18 PROCEDURE — 99499 UNLISTED E&M SERVICE: CPT | Mod: HCNC,,, | Performed by: NURSE PRACTITIONER

## 2020-06-18 PROCEDURE — 3044F PR MOST RECENT HEMOGLOBIN A1C LEVEL <7.0%: ICD-10-PCS | Mod: HCNC,CPTII,, | Performed by: NURSE PRACTITIONER

## 2020-06-18 PROCEDURE — 99215 PR OFFICE/OUTPT VISIT, EST, LEVL V, 40-54 MIN: ICD-10-PCS | Mod: HCNC,,, | Performed by: NURSE PRACTITIONER

## 2020-06-18 PROCEDURE — 99215 OFFICE O/P EST HI 40 MIN: CPT | Mod: HCNC,,, | Performed by: NURSE PRACTITIONER

## 2020-06-18 PROCEDURE — 99499 RISK ADDL DX/OHS AUDIT: ICD-10-PCS | Mod: HCNC,,, | Performed by: NURSE PRACTITIONER

## 2020-06-18 PROCEDURE — 3044F HG A1C LEVEL LT 7.0%: CPT | Mod: HCNC,CPTII,, | Performed by: NURSE PRACTITIONER

## 2020-06-18 NOTE — PROGRESS NOTES
Chronic Pain Education   NP Note- Visit #2: PAIN SELF-MANAGEMENT GOAL SETTING    Subjective:       Chief Complaint Requiring Rehabilitation: chronic Pain    Consulted by: Dr. Marin (Pain Management)    Chronic Pain Education Visit #2:  Yanni Kaiser returns one week after CPE visit #1 for f/u and goal setting. We discussed 5 domains of health on which this Chronic Pain Self-Management program will focus:  1. Physical Health and Activity/Exercise (strengthening, stretching, aerobic exercises; address smoking and opioid use if pertinent)  2. Sleep/Sleep Hygiene  3. Mental/Emotional Health (including relaxation/meditation practice)  4. Nutrition   5. Socialization     She would like to focus for now on Physical Activity and Sleep as she sees these as two areas which significantly impact her function and QOL.     She said that since [facility] she has walked 3/7 days. She said that she did 5 laps around her neighborhood park the first day and went longer the second day. She also has used an exercise step-up and did stretches and squats. She has noted an increase in her left foot pain and attributes this to her walking.     We also talked about her sleep- she usually gets in bed around 9:30/10. she said that she reads scripture on her phone before going to bed and falls asleep listening to music. She takes topamax, lyrica, and zanaflex at bedtime. She usually wakes up around 1-2 AM to use the bathroom and has difficulty falling asleep after that. She said that she stays in bed when she can't fall back asleep because she knows if she wakes up her  will also wake when he realizes she is not there. She is followed my Sleep Medicine and LCV was 4/2/20- pt set a goal to lose 20 lbs and will f/u in 3 months for repeat home sleep testing. She does have a diagnosis of SHARATH and multiple co-morbid risk factors.       Chronic Pain Education Visit #1:    Measures obtained:  PDI: 65/70  PCS: 37/52  Promis-29:      Yanni Kaiser is a 49 y.o. F who presents today with chronic pain and was referred by Dr. Marin for Chronic Pain Education (CPE). CPE was developed to enhance patients' understanding of their pain and its impact on health and to provide ongoing chronic pain self-management counseling, education, and support.     Yanni Kaiser has a history of chronic pain for many years. She denies an inciting event or trauma. She has a PMH of DM, chronic bronchitis, GERD, asthma, pseudotumor cerebri (2002), seizures (2002), migraines, sleep apnea (no CPAP). Per review of Rheumatology and Pain Mgmt notes pt has been dx with Fibromyalgia. She also has lumbar stenosis with moderate central and foraminal narrowing at L4-5 per MRI report (5/20/20). She is scheduled for a L-NAKITA with Dr. Marin on 6/24/20. Dr. Marin also referred her to outpatient PT and she has her initial eval today at 1:00 pm.     She also has a hx of left foot pain and is s/p Plantar Fasciotomy surgery 11/2019 with Dr. Haven DPM. Last visit with him was 4/20/20 where she was given percocet, referred to Rheumatology for chronic pain syndrome and advised to apply voltaren gel and massage foot with frozen water bottle.     She is followed by Bariatrics for weight loss and is taking medication to help with this. Education with regard to diet and exercise has also been stressed.     HEP:  None    Physical Therapy:  Scheduled to start today.     Pain Medications:         · Currently taking: lyrica, tizanidine, effexor, topamax, imitrex    · Has tried in the past: norco, percocet, tramadol, ibuprofen, mobic, neurontin, flexeril, voltaren gel    Interventional Procedures:  Scheduled for L-NAKITA 6/24/20 (Dr. Marin)    Relevant Surgeries:  none    Affecting sleep?  Yes    Affecting daily activities?  Yes    Affecting mood?  Yes     Work status:  Disabled 2/2 seizures/pseudotumor cerebri     Pt does smoke cigarettes daily. Denies hx of substance  abuse.       Past Medical History:   Diagnosis Date    Allergy     Anemia     Asthma     Back pain     Chronic bronchitis     Cigarette smoker     DDD (degenerative disc disease), lumbar 2020    Depression     Diabetes with neurologic complications     DUB (dysfunctional uterine bleeding) 10/16/2018    GERD (gastroesophageal reflux disease)     High cholesterol     Hyperprolactinemia     Hypertension     Influenza A 2020    Obese body habitus     Pseudotumor cerebri     Renal manifestation of secondary diabetes mellitus     Respiratory failure     Seizures     Simple endometrial hyperplasia     Sleep apnea     Smoker     Tobacco dependence        Past Surgical History:   Procedure Laterality Date    BREAST CYST ASPIRATION       SECTION      X 1    CHOLECYSTECTOMY      COLONOSCOPY      COLONOSCOPY N/A 2019    Procedure: COLONOSCOPY;  Surgeon: Jagruti Sweeney MD;  Location: [facility] ([address] [place] [address]);  Service: Endoscopy;  Laterality: N/A;  BMI is 63/gastroparesis/3 days full liquid diet 1 day clear liquid see telephone encounter by Ciara limon    ESOPHAGEAL MANOMETRY WITH MEASUREMENT OF IMPEDANCE N/A 2019    Procedure: MANOMETRY-ESOPHAGEAL-WITH IMPEDANCE;  Surgeon: Jagruti Sweeney MD;  Location: [facility] ([address] [place] [address]);  Service: Endoscopy;  Laterality: N/A;  Hold Narcotics x 1 days   Hold TCA x 1 days  Propofol only. No fentanyl or benzodiazepine during sedation. If additional sedation needed, discuss with Dr. Sweeney.  10/29 - pt confirmed appt    ESOPHAGOGASTRODUODENOSCOPY N/A 2019    Procedure: EGD (ESOPHAGOGASTRODUODENOSCOPY);  Surgeon: Jagruti Sweeney MD;  Location: [facility] ([address] [place] [address]);  Service: Endoscopy;  Laterality: N/A;  BMI is 63/gastroparesis/3 days full liquid diet 1 day clear liquid see telephone encounter by Ciara limon    ESOPHAGOGASTRODUODENOSCOPY N/A 2019    Procedure: ESOPHAGOGASTRODUODENOSCOPY (EGD);  Surgeon:  Jagruti Sweeney MD;  Location: [facility] ENDO (2ND FLR);  Service: Endoscopy;  Laterality: N/A;  EGD with EndoFlip /+/- Botox  2nd floor for gastroparesis/BMI 63 **367lbs**  Bariatric Stretcher needed  Manometry probe to be placed endoscopically during EGD procedure  Due to change in protocol, Full liquid diet for 3 days/ 1 day of clear liquids  Hi    FOOT FRACTURE SURGERY Left     HYSTEROSCOPY WITH DILATION AND CURETTAGE OF UTERUS N/A 10/16/2018    KJB    PLANTAR FASCIOTOMY Left 11/18/2019    Procedure: FASCIOTOMY, PLANTAR;  Surgeon: Valentino Orourke DPM;  Location: [facility] OR 2ND FLR;  Service: Podiatry;  Laterality: Left;    RETINAL LASER PROCEDURE Left     SINUS SURGERY      UPPER GASTROINTESTINAL ENDOSCOPY         Review of patient's allergies indicates:   Allergen Reactions    Gabapentin      Makes her twitch       Current Outpatient Medications   Medication Sig Dispense Refill    acetaZOLAMIDE (DIAMOX) 500 mg CpSR Take 1 capsule (500 mg total) by mouth 2 (two) times daily. 360 capsule 1    albuterol (PROVENTIL/VENTOLIN HFA) 90 mcg/actuation inhaler Inhale 2 puffs into the lungs every 6 (six) hours as needed. Rescue 54 g 0    albuterol-ipratropium (DUO-NEB) 2.5 mg-0.5 mg/3 mL nebulizer solution Take 3 mLs by nebulization every 6 (six) hours as needed for Wheezing or Shortness of Breath. Rescue 100 mL 3    atorvastatin (LIPITOR) 40 MG tablet Take 1 tablet (40 mg total) by mouth once daily. (Patient not taking: Reported on 6/9/2020) 90 tablet 3    blood sugar diagnostic Strp 1 each by Misc.(Non-Drug; Combo Route) route 4 (four) times daily before meals and nightly. ACCU CHEK ELVIA PLUS METER 200 each 3    blood-glucose meter (ACCU-CHEK ELVIA PLUS METER) Misc TEST FOUR TIMES DAILY BEFORE MEALS AND EVERY NIGHT 90 each 1    budesonide-formoterol 160-4.5 mcg (SYMBICORT) 160-4.5 mcg/actuation HFAA Inhale 2 puffs into the lungs every 12 (twelve) hours. Controller 1 Inhaler 5    desloratadine (CLARINEX) 5 mg  tablet Take 1 tablet (5 mg total) by mouth once daily. 30 tablet 11    fluticasone propionate (FLONASE) 50 mcg/actuation nasal spray 1 spray (50 mcg total) by Each Nare route once daily. 15.8 mL 2    HYDROcodone-acetaminophen (NORCO) 5-325 mg per tablet Take 1 tablet by mouth every 8 (eight) hours as needed for Pain. 21 tablet 0    lactulose (CHRONULAC) 10 gram/15 mL solution TAKE 15 MLS BY MOUTH THREE TIMES DAILY AS NEEDED (Patient not taking: Reported on 6/9/2020) 473 mL 6    lancets (ACCU-CHEK SOFTCLIX LANCETS) Misc 1 Device by Misc.(Non-Drug; Combo Route) route 4 (four) times daily. 200 each 3    lidocaine (LIDODERM) 5 % Place 1 patch onto the skin once daily. Remove & Discard patch within 12 hours or as directed by MD 15 patch 0    losartan (COZAAR) 100 MG tablet TAKE 1 TABLET(100 MG) BY MOUTH EVERY DAY 90 tablet 0    metFORMIN (GLUCOPHAGE-XR) 500 MG XR 24hr tablet TAKE 2 TABLETS(1000 MG) BY MOUTH TWICE DAILY WITH MEALS 360 tablet 0    ondansetron (ZOFRAN) 8 MG tablet Take 1 tablet (8 mg total) by mouth every 8 (eight) hours as needed for Nausea. 90 tablet 5    pantoprazole (PROTONIX) 40 MG tablet Take 40 mg by mouth once daily.   3    pregabalin (LYRICA) 50 MG capsule Take 1 capsule (50 mg total) by mouth 2 (two) times daily. 60 capsule 0    prucalopride 2 mg Tab Take 1 tablet by mouth once daily. 90 tablet 3    QUEtiapine (SEROQUEL) 25 MG Tab Take 1 tablet (25 mg total) by mouth once daily. 90 tablet 0    semaglutide (OZEMPIC) 1 mg/dose (2 mg/1.5 mL) PnIj Inject 1 mg subq weekly. 9 mL 1    sumatriptan (IMITREX) 50 MG tablet Take 1 tab at onset of headaches.  If no improvement in 2 hours, take another.  Do not take more than 2 tabs in 24 hours. 9 tablet 5    topiramate (TOPAMAX) 100 MG tablet TAKE 3 TABLETS BY MOUTH TWICE DAILY 180 tablet 11    venlafaxine (EFFEXOR-XR) 75 MG 24 hr capsule TAKE 1 CAPSULE(75 MG) BY MOUTH EVERY EVENING 90 capsule 3     No current facility-administered medications  for this visit.        Family History   Problem Relation Age of Onset    Hypertension Mother     Diabetes Mother     Colon cancer Mother 50    Colon polyps Mother     Heart disease Father     COPD Father     Heart attack Father     Hypertension Father     Ulcers Father     Cancer Maternal Grandmother     Breast cancer Maternal Grandmother     Colon cancer Maternal Grandmother     Colon polyps Maternal Grandmother     No Known Problems Paternal Grandmother     No Known Problems Brother     No Known Problems Maternal Aunt     No Known Problems Maternal Uncle     No Known Problems Paternal Aunt     No Known Problems Paternal Uncle     No Known Problems Maternal Grandfather     No Known Problems Paternal Grandfather     Celiac disease Neg Hx     Cirrhosis Neg Hx     Crohn's disease Neg Hx     Cystic fibrosis Neg Hx     Esophageal cancer Neg Hx     Hemochromatosis Neg Hx     Inflammatory bowel disease Neg Hx     Irritable bowel syndrome Neg Hx     Liver cancer Neg Hx     Liver disease Neg Hx     Rectal cancer Neg Hx     Stomach cancer Neg Hx     Ulcerative colitis Neg Hx     Jonnie's disease Neg Hx     Tuberculosis Neg Hx     Lymphoma Neg Hx     Scleroderma Neg Hx     Rheum arthritis Neg Hx     Melanoma Neg Hx     Multiple sclerosis Neg Hx     Psoriasis Neg Hx     Lupus Neg Hx     Skin cancer Neg Hx     Amblyopia Neg Hx     Blindness Neg Hx     Cataracts Neg Hx     Glaucoma Neg Hx     Macular degeneration Neg Hx     Retinal detachment Neg Hx     Strabismus Neg Hx     Stroke Neg Hx     Thyroid disease Neg Hx        Social History     Socioeconomic History    Marital status:      Spouse name: Not on file    Number of children: Not on file    Years of education: Not on file    Highest education level: Not on file   Occupational History    Not on file   Social Needs    Financial resource strain: Very hard    Food insecurity     Worry: Often true      Inability: Often true    Transportation needs     Medical: No     Non-medical: No   Tobacco Use    Smoking status: Current Every Day Smoker     Packs/day: 1.00     Years: 27.00     Pack years: 27.00     Types: Cigarettes     Start date: 1/1/1992    Smokeless tobacco: Never Used    Tobacco comment: 1/2 a pack if her days are good   Substance and Sexual Activity    Alcohol use: Yes     Alcohol/week: 0.0 standard drinks     Frequency: Never     Comment: socially    Drug use: No    Sexual activity: Yes     Partners: Male     Birth control/protection: None   Lifestyle    Physical activity     Days per week: Not on file     Minutes per session: Not on file    Stress: Not on file   Relationships    Social connections     Talks on phone: More than three times a week     Gets together: Three times a week     Attends [employer] service: More than 4 times per year     Active member of club or organization: Yes     Attends meetings of clubs or organizations: More than 4 times per year     Relationship status:    Other Topics Concern    Not on file   Social History Narrative    Not on file              Objective:       Objective:     GEN: fully alert, oriented. Well developed, well nourished. No acute distress.   HEENT: No trauma. Mucous membranes moist. Nares patent bilaterally.  PSYCH: Normal affect. Thought content appropriate.  CHEST: Breathing symmetric. No audible wheezing.  SKIN: Warm, pink, dry. No rash on exposed areas.   EXT: No cyanosis, clubbing, or edema. No color change or changes in nail or hair growth  Gait: pt able to walk independently without assistive device or support.           Imaging:    CLINICAL HISTORY:  spinal stenosis; Lumbago with sciatica, right side     TECHNIQUE:  Multiplanar, multisequence MR images were acquired from the thoracolumbar junction to the sacrum without contrast.     COMPARISON:  MRI lumbar spine 08/28/2018     FINDINGS:  Straightening of the normal lumbar lordosis.   Mild grade 1 retrolisthesis L5 on S1     Vertebral body heights are maintained.  Several T1 and T2 hyperintense lesions within the L2 and L3 vertebral body favored to represent osseous hemangiomas.  Otherwise normal marrow signal.  No fracture.     Multilevel partial disc desiccation throughout the lumbar spine.  Posterior annular fissuring at L2-L3.     Distal spinal cord is unremarkable.  Conus terminates at L1-L2.     Degenerative findings:     T12-L1: No disc herniation, spinal canal stenosis, or neural foraminal narrowing.     L1-L2: No disc herniation, spinal canal stenosis, or neural foraminal narrowing.     L2-L3: No disc herniation, spinal canal stenosis or neural foraminal narrowing.     L3-L4: Mild circumferential disc bulge without spinal canal stenosis or neural foraminal narrowing.     L4-L5: Circumferential disc bulge, facet arthropathy, and ligamentum flavum hypertrophy resulting in moderate spinal canal stenosis and moderate bilateral neural foraminal narrowing.     L5-S1: Circumferential disc bulge and facet arthropathy without spinal canal stenosis or neural foraminal narrowing.     Paraspinal muscles and soft tissues are unremarkable.     Impression:     Multilevel lumbar spondylosis most prominent at L4-5 resulting in moderate spinal canal stenosis and moderate bilateral neural foraminal narrowing.  Findings have slightly progressed in comparison to prior study dated 08/28/2018.     Electronically signed by resident: Fady Reese  Date:                                            05/22/2020  Time:                                           08:27     Electronically signed by: Abelardo Gold MD  Date:                                            05/22/2020  Time:                                           08:57  EXAMINATION:  MRI FOOT (HINDFOOT) LEFT W W/O CONTRAST     CLINICAL HISTORY:  Foot pain, chronic, plantar fasciitis suspected;  Pain in left foot     TECHNIQUE:  Routine MRI evaluation of the  left hindfoot performed with and without the use of 10 cc of Gadavist IV contrast.     COMPARISON:  Radiograph 09/06/2019.     FINDINGS:  Tendons: There is fusiform thickening of the distal 5.4 cm of the Achilles tendon with low-grade partial-thickness interstitial tearing of the anterior distal fibers and associated peritendinitis.  Note is made of a 0.4 cm fluid filled gap within the anteromedial fibers of the distal Achilles tendon corresponding to aforementioned interstitial tear.  There is trace fluid within the peroneal tendon sheath suggesting tenosynovitis.  Posterior tibial, flexor digitorum longus, flexor hallucis longus and extensor tendons are normal.     Ligaments: There is slight irregularity at the fibular insertion of the anterior talofibular ligament, presumably related to remote trauma.  Posterior talofibular, calcaneofibular, deltoid, spring and visualized portions of the Lisfranc ligament are normal.  Bifurcate, dorsal talonavicular and dorsal calcaneocuboid ligaments are intact.     Bones: No fractures.  No avascular necrosis.  No marrow infiltrative process.     Cartilage: No osteochondral lesions.  No partial or full-thickness cartilage defects.  No imaging findings to suggest a tarsal coalition.     Muscles: No accessory muscles.  Muscle bulk is preserved.     Miscellaneous: There is thickening and increased signal intensity of the central cord of the plantar fascia with associated edema of the calcaneus, flexor digitorum brevis and adjacent heel pad.  No full-thickness tear or retraction.  Tarsal tunnel is normal.  Sinus tarsi demonstrates slight increased signal intensity with grossly intact cervical and interosseous ligaments.  There is trace fluid within the retrocalcaneal bursa.     Impression:     1. MR imaging findings consistent with plantar fasciitis noting thickening and increased signal intensity within the central cord of the plantar fascia with associated edema of the flexor  digitorum brevis, calcaneus and adjacent heel pad.  2. Distal Achilles tendinosis with low-grade interstitial tearing and associated peritendinitis.  Trace fluid within the retrocalcaneal bursa suggests bursitis.  3. Trace peroneal tenosynovitis.  4. Irregularity at the fibular insertion of the anterior talofibular ligament suggesting sequela of a remote injury.  No full-thickness tear.        Electronically signed by: Chapin Singleton MD  Date:                                            09/12/2019  Time:                                           13:11          Assessment:     Encounter Diagnoses   Name Primary?    Chronic pain disorder Yes    Fibromyalgia     Impaired instrumental activities of daily living (IADL)     Decreased activities of daily living (ADL)     Lumbar radiculopathy     Spinal stenosis of lumbar region, unspecified whether neurogenic claudication present     Tobacco use disorder     Diabetic polyneuropathy associated with type 2 diabetes mellitus     Left foot pain     Plantar fasciitis of left foot     Obesity, unspecified classification, unspecified obesity type, unspecified whether serious comorbidity present     Mood disturbance     Sleep disturbance        Plan:     Diagnoses and all orders for this visit:    Chronic pain disorder    Fibromyalgia    Impaired instrumental activities of daily living (IADL)    Decreased activities of daily living (ADL)    Lumbar radiculopathy    Spinal stenosis of lumbar region, unspecified whether neurogenic claudication present    Tobacco use disorder    Diabetic polyneuropathy associated with type 2 diabetes mellitus    Left foot pain    Plantar fasciitis of left foot    Obesity, unspecified classification, unspecified obesity type, unspecified whether serious comorbidity present    Mood disturbance    Sleep disturbance      Lyndseyivan aKiser discussed how her pain has affected her life and determined the following 4 Week Goals. The main  focus for the next 4 weeks will be on Physical Activity and Sleep. I also suggested she consider adding an Emotional Health goal that may enhance her compliance with her other goals. We discussed how mindfulness/meditation is a reasearch-proven pain and stress reducer and can be easily implemented into a daily routine with practice. Other domains of health were addressed as well, and notes have been made for long-term plan/goals.     Physical Health and Activity:  - Walking Program Tip Sheet provided.   - Walk daily- at least 10 minutes/day, working your way up to 30 min/day (use tip sheet for guidance)  - Strengthening exercises- pt is currently enrolled in PT which will be a great segue into HEP.     *going to a gym and/or group classes regularly is ideal, but given the COVID19 pandemic and pt's medium/high risk status, exercising at home or outside may be ideal.   - Will discuss tobacco cessation in greater detail at next encounter.     Sleep:  - Sleep Hygiene Tip Sheet provided.   - Read outside of the bedroom before bedtime. If you wake in the middle of the night and can't fall back asleep, leave the bedroom and coming back to bed when you are feeling tired again.    - Continue f/u with Sleep Medicine (next appt due July 2020)    Emotional/Mental Health:  - Mindfulness Tip Sheet and web resources provided.   - Practice mindful meditation once daily to promote relaxation and to help reduce stress and pain.   - Continue reading scripture and listening to music.   *Consider referral to LCSW or Psych for therapy in the future.    Nutrition:  - Review of recommendations from Bariatric Clinic Encouraged compliance and continued f/u.     Socialization:  - Pt notes that she has engaged and encouraged family members to walk with her. Commended her for this and encouraged that she continue to do this as it also promotes accountability.         I spent a total of 60 minutes with the patient, and greater than 50% of the  time was spent in counseling and education.     The above plan and management options were discussed at length with patient. Patient is in agreement with the above and verbalized understanding. It will be communicated with the referring physician via electronic record, fax, or mail.

## 2020-06-21 PROBLEM — Z72.0 TOBACCO ABUSE: Status: ACTIVE | Noted: 2020-06-21

## 2020-06-21 PROBLEM — R06.03 ACUTE RESPIRATORY DISTRESS: Status: RESOLVED | Noted: 2020-01-16 | Resolved: 2020-06-21

## 2020-06-21 NOTE — PROGRESS NOTES
"Routine Office Visit    Patient Name: Yanni Kaiser    : 1970  MRN: [id number]    Subjective:  Yanni is a 49 y.o. female who presents today for:    1. Follow up  Patient following up today for HTN, type 2 DM, and chronic lower back pain with sciatica.  She states she is taking medications as prescribed, but has not worked on weight loss as discussed at previous appointments.  She states that her lower back pain is now worse than before.  She has had foot surgery for plantar fasciitis, but states her feet continue to hurt.  She has asthma, but continues to smoke.  She states that she tried quitting, but "that didn't work out'.  She has done smoking cessation and does not want to do it again at this time.      Past Medical History  Past Medical History:   Diagnosis Date    Allergy     Anemia     Asthma     Back pain     Chronic bronchitis     Cigarette smoker     DDD (degenerative disc disease), lumbar 2020    Depression     Diabetes with neurologic complications     DUB (dysfunctional uterine bleeding) 10/16/2018    GERD (gastroesophageal reflux disease)     High cholesterol     Hyperprolactinemia     Hypertension     Influenza A 2020    Obese body habitus     Pseudotumor cerebri     Renal manifestation of secondary diabetes mellitus     Respiratory failure     Seizures     Simple endometrial hyperplasia 2018    Sleep apnea     Smoker     Tobacco dependence        Past Surgical History  Past Surgical History:   Procedure Laterality Date    BREAST CYST ASPIRATION       SECTION      X 1    CHOLECYSTECTOMY      COLONOSCOPY      COLONOSCOPY N/A 2019    Procedure: COLONOSCOPY;  Surgeon: Jagruti Sweeney MD;  Location: [address]);  Service: Endoscopy;  Laterality: N/A;  BMI is 63/gastroparesis/3 days full liquid diet 1 day clear liquid see telephone encounter by Ciara limon    ESOPHAGEAL MANOMETRY WITH MEASUREMENT OF IMPEDANCE N/A " 11/5/2019    Procedure: MANOMETRY-ESOPHAGEAL-WITH IMPEDANCE;  Surgeon: Jagruti Sweeney MD;  Location: [facility] ENDO (2ND FLR);  Service: Endoscopy;  Laterality: N/A;  Hold Narcotics x 1 days   Hold TCA x 1 days  Propofol only. No fentanyl or benzodiazepine during sedation. If additional sedation needed, discuss with Dr. Sweeney.  10/29 - pt confirmed appt    ESOPHAGOGASTRODUODENOSCOPY N/A 1/14/2019    Procedure: EGD (ESOPHAGOGASTRODUODENOSCOPY);  Surgeon: Jagruti Sweeney MD;  Location: [facility] (2ND FLR);  Service: Endoscopy;  Laterality: N/A;  BMI is 63/gastroparesis/3 days full liquid diet 1 day clear liquid see telephone encounter by Ciara limon    ESOPHAGOGASTRODUODENOSCOPY N/A 11/5/2019    Procedure: ESOPHAGOGASTRODUODENOSCOPY (EGD);  Surgeon: Jagruti Sweeney MD;  Location: [facility] (2ND FLR);  Service: Endoscopy;  Laterality: N/A;  EGD with EndoFlip /+/- Botox  2nd floor for gastroparesis/BMI 63 **367lbs**  Bariatric Stretcher needed  Manometry probe to be placed endoscopically during EGD procedure  Due to change in protocol, Full liquid diet for 3 days/ 1 day of clear liquids  Hi    FOOT FRACTURE SURGERY Left     HYSTEROSCOPY WITH DILATION AND CURETTAGE OF UTERUS N/A 10/16/2018    [facility]    PLANTAR FASCIOTOMY Left 11/18/2019    Procedure: FASCIOTOMY, PLANTAR;  Surgeon: Valentino Orourke DPM;  Location: [facility] OR 2ND FLR;  Service: Podiatry;  Laterality: Left;    RETINAL LASER PROCEDURE Left     SINUS SURGERY      UPPER GASTROINTESTINAL ENDOSCOPY         Family History  Family History   Problem Relation Age of Onset    Hypertension Mother     Diabetes Mother     Colon cancer Mother 50    Colon polyps Mother     Heart disease Father     COPD Father     Heart attack Father     Hypertension Father     Ulcers Father     Cancer Maternal Grandmother     Breast cancer Maternal Grandmother     Colon cancer Maternal Grandmother     Colon polyps Maternal Grandmother     No Known Problems  Paternal Grandmother     No Known Problems Brother     No Known Problems Maternal Aunt     No Known Problems Maternal Uncle     No Known Problems Paternal Aunt     No Known Problems Paternal Uncle     No Known Problems Maternal Grandfather     No Known Problems Paternal Grandfather     Celiac disease Neg Hx     Cirrhosis Neg Hx     Crohn's disease Neg Hx     Cystic fibrosis Neg Hx     Esophageal cancer Neg Hx     Hemochromatosis Neg Hx     Inflammatory bowel disease Neg Hx     Irritable bowel syndrome Neg Hx     Liver cancer Neg Hx     Liver disease Neg Hx     Rectal cancer Neg Hx     Stomach cancer Neg Hx     Ulcerative colitis Neg Hx     Jonnie's disease Neg Hx     Tuberculosis Neg Hx     Lymphoma Neg Hx     Scleroderma Neg Hx     Rheum arthritis Neg Hx     Melanoma Neg Hx     Multiple sclerosis Neg Hx     Psoriasis Neg Hx     Lupus Neg Hx     Skin cancer Neg Hx     Amblyopia Neg Hx     Blindness Neg Hx     Cataracts Neg Hx     Glaucoma Neg Hx     Macular degeneration Neg Hx     Retinal detachment Neg Hx     Strabismus Neg Hx     Stroke Neg Hx     Thyroid disease Neg Hx        Social History  Social History     Socioeconomic History    Marital status:      Spouse name: Not on file    Number of children: Not on file    Years of education: Not on file    Highest education level: Not on file   Occupational History    Not on file   Social Needs    Financial resource strain: Very hard    Food insecurity     Worry: Often true     Inability: Often true    Transportation needs     Medical: No     Non-medical: No   Tobacco Use    Smoking status: Current Every Day Smoker     Packs/day: 1.00     Years: 27.00     Pack years: 27.00     Types: Cigarettes     Start date: 1/1/1992    Smokeless tobacco: Never Used    Tobacco comment: 1/2 a pack if her days are good   Substance and Sexual Activity    Alcohol use: Yes     Alcohol/week: 0.0 standard drinks     Frequency: Never      Comment: socially    Drug use: No    Sexual activity: Yes     Partners: Male     Birth control/protection: None   Lifestyle    Physical activity     Days per week: Not on file     Minutes per session: Not on file    Stress: Not on file   Relationships    Social connections     Talks on phone: More than three times a week     Gets together: Three times a week     Attends [employer] service: More than 4 times per year     Active member of club or organization: Yes     Attends meetings of clubs or organizations: More than 4 times per year     Relationship status:    Other Topics Concern    Not on file   Social History Narrative    Not on file       Current Medications  Current Outpatient Medications on File Prior to Visit   Medication Sig Dispense Refill    acetaZOLAMIDE (DIAMOX) 500 mg CpSR Take 1 capsule (500 mg total) by mouth 2 (two) times daily. 360 capsule 1    albuterol (PROVENTIL/VENTOLIN HFA) 90 mcg/actuation inhaler Inhale 2 puffs into the lungs every 6 (six) hours as needed. Rescue 54 g 0    albuterol-ipratropium (DUO-NEB) 2.5 mg-0.5 mg/3 mL nebulizer solution Take 3 mLs by nebulization every 6 (six) hours as needed for Wheezing or Shortness of Breath. Rescue 100 mL 3    atorvastatin (LIPITOR) 40 MG tablet Take 1 tablet (40 mg total) by mouth once daily. (Patient not taking: Reported on 6/9/2020) 90 tablet 3    blood sugar diagnostic Strp 1 each by Misc.(Non-Drug; Combo Route) route 4 (four) times daily before meals and nightly. ACCU CHEK ELVIA PLUS METER 200 each 3    blood-glucose meter (ACCU-CHEK ELVIA PLUS METER) Misc TEST FOUR TIMES DAILY BEFORE MEALS AND EVERY NIGHT 90 each 1    budesonide-formoterol 160-4.5 mcg (SYMBICORT) 160-4.5 mcg/actuation HFAA Inhale 2 puffs into the lungs every 12 (twelve) hours. Controller 1 Inhaler 5    desloratadine (CLARINEX) 5 mg tablet Take 1 tablet (5 mg total) by mouth once daily. 30 tablet 11    fluticasone propionate (FLONASE) 50  mcg/actuation nasal spray 1 spray (50 mcg total) by Each Nare route once daily. 15.8 mL 2    lactulose (CHRONULAC) 10 gram/15 mL solution TAKE 15 MLS BY MOUTH THREE TIMES DAILY AS NEEDED (Patient not taking: Reported on 6/9/2020) 473 mL 6    lancets (ACCU-CHEK SOFTCLIX LANCETS) Misc 1 Device by Misc.(Non-Drug; Combo Route) route 4 (four) times daily. 200 each 3    lidocaine (LIDODERM) 5 % Place 1 patch onto the skin once daily. Remove & Discard patch within 12 hours or as directed by MD 15 patch 0    losartan (COZAAR) 100 MG tablet TAKE 1 TABLET(100 MG) BY MOUTH EVERY DAY 90 tablet 0    metFORMIN (GLUCOPHAGE-XR) 500 MG XR 24hr tablet TAKE 2 TABLETS(1000 MG) BY MOUTH TWICE DAILY WITH MEALS 360 tablet 0    ondansetron (ZOFRAN) 8 MG tablet Take 1 tablet (8 mg total) by mouth every 8 (eight) hours as needed for Nausea. 90 tablet 5    pantoprazole (PROTONIX) 40 MG tablet Take 40 mg by mouth once daily.   3    pregabalin (LYRICA) 50 MG capsule Take 1 capsule (50 mg total) by mouth 2 (two) times daily. 60 capsule 0    prucalopride 2 mg Tab Take 1 tablet by mouth once daily. 90 tablet 3    QUEtiapine (SEROQUEL) 25 MG Tab Take 1 tablet (25 mg total) by mouth once daily. 90 tablet 0    sumatriptan (IMITREX) 50 MG tablet Take 1 tab at onset of headaches.  If no improvement in 2 hours, take another.  Do not take more than 2 tabs in 24 hours. 9 tablet 5    topiramate (TOPAMAX) 100 MG tablet TAKE 3 TABLETS BY MOUTH TWICE DAILY 180 tablet 11    venlafaxine (EFFEXOR-XR) 75 MG 24 hr capsule TAKE 1 CAPSULE(75 MG) BY MOUTH EVERY EVENING 90 capsule 3     No current facility-administered medications on file prior to visit.        Allergies   Review of patient's allergies indicates:   Allergen Reactions    Gabapentin      Makes her twitch       Review of Systems (Pertinent positives)  Review of Systems   Constitutional: Negative.    HENT: Negative.    Eyes: Negative.    Respiratory: Negative.    Cardiovascular: Positive for  "leg swelling. Negative for chest pain, palpitations and orthopnea.   Gastrointestinal: Negative.    Genitourinary: Negative.    Musculoskeletal: Positive for back pain, joint pain and myalgias.   Skin: Negative.    Neurological: Negative.          /83 (BP Location: Left arm, Patient Position: Sitting, BP Method: Large (Automatic))   Pulse 69   Temp 96.9 °F (36.1 °C) (Temporal)   Resp 17   Ht 5' 4.02" (1.626 m)   Wt (!) 153 kg (337 lb 4.9 oz)   SpO2 97%   BMI 57.87 kg/m²     GENERAL APPEARANCE: in no apparent distress and well developed and well nourished  HEENT: PERRL, EOMI, Sclera clear, anicteric, Oropharynx clear, no lesions, Neck supple with midline trachea  NECK: normal, supple, no adenopathy, thyroid normal in size  RESPIRATORY: appears well, vitals normal, no respiratory distress, acyanotic, normal RR, chest clear, no wheezing, crepitations, rhonchi, normal symmetric air entry  HEART: regular rate and rhythm, S1, S2 normal, no murmur, click, rub or gallop.    ABDOMEN: abdomen is soft without tenderness, no masses, no hernias.  Extremities: warm/well perfused.  No abnormal hair patterns.  +2 edema BLE  SKIN: no rashes, no wounds, no other lesions  PSYCH: Alert, oriented x 3, thought content appropriate, speech normal, pleasant and cooperative, good eye contact  MS:  No pain on palpation of spinous processes, but pain with bending to bilateral lateral lower back regions    Assessment/Plan:  Yanni Kaiser is a 49 y.o. female who presents today for :    Yanni was seen today for follow-up.    Diagnoses and all orders for this visit:    Essential hypertension  -     Comprehensive metabolic panel; Future    Mixed hyperlipidemia  -     Lipid Panel; Future    Type 2 diabetes mellitus with stage 3 chronic kidney disease, with long-term current use of insulin  -     Comprehensive metabolic panel; Future  -     Hemoglobin A1C; Future    Chronic bilateral low back pain with bilateral sciatica  -    "  HYDROcodone-acetaminophen (NORCO) 5-325 mg per tablet; Take 1 tablet by mouth every 8 (eight) hours as needed for Pain.    Tobacco abuse    Morbid obesity with BMI of 50.0-59.9, adult        1.  Labs to be done today  2.  Refilled norco  3.  She was encouraged to lose weight  4.  She was encouraged to quit smoking  5.  Follow up with pain management as directed  6.  Call with any concerns  7.  Follow up 3 months or sooner if needed  8.  Patient warned of risk of dependence on opiate pain medication and to never mix with alcohol      Carlyle Gordillo MD

## 2020-06-22 ENCOUNTER — HOSPITAL ENCOUNTER (OUTPATIENT)
Dept: PREADMISSION TESTING | Facility: OTHER | Age: 50
Discharge: HOME | End: 2020-06-22
Attending: ANESTHESIOLOGY
Payer: MEDICARE

## 2020-06-22 DIAGNOSIS — Z01.812 PRE-PROCEDURE LAB EXAM: ICD-10-CM

## 2020-06-22 PROCEDURE — U0003 INFECTIOUS AGENT DETECTION BY NUCLEIC ACID (DNA OR RNA); SEVERE ACUTE RESPIRATORY SYNDROME CORONAVIRUS 2 (SARS-COV-2) (CORONAVIRUS DISEASE [COVID-19]), AMPLIFIED PROBE TECHNIQUE, MAKING USE OF HIGH THROUGHPUT TECHNOLOGIES AS DESCRIBED BY CMS-2020-01-R: HCPCS | Mod: HCNC

## 2020-06-23 ENCOUNTER — PATIENT MESSAGE (OUTPATIENT)
Dept: FAMILY MEDICINE | Facility: CLINIC | Age: 50
End: 2020-06-23

## 2020-06-23 LAB — SARS-COV-2 RNA RESP QL NAA+PROBE: NOT DETECTED

## 2020-06-24 ENCOUNTER — HOSPITAL ENCOUNTER (OUTPATIENT)
Facility: OTHER | Age: 50
Discharge: HOME | End: 2020-06-24
Attending: ANESTHESIOLOGY | Admitting: ANESTHESIOLOGY
Payer: MEDICARE

## 2020-06-24 VITALS
OXYGEN SATURATION: 98 % | TEMPERATURE: 98 F | HEART RATE: 59 BPM | SYSTOLIC BLOOD PRESSURE: 167 MMHG | WEIGHT: 293 LBS | RESPIRATION RATE: 18 BRPM | DIASTOLIC BLOOD PRESSURE: 87 MMHG | HEIGHT: 64 IN | BODY MASS INDEX: 50.02 KG/M2

## 2020-06-24 DIAGNOSIS — M54.16 LUMBAR RADICULOPATHY: Primary | ICD-10-CM

## 2020-06-24 DIAGNOSIS — M48.061 SPINAL STENOSIS OF LUMBAR REGION, UNSPECIFIED WHETHER NEUROGENIC CLAUDICATION PRESENT: ICD-10-CM

## 2020-06-24 DIAGNOSIS — G89.29 CHRONIC PAIN: ICD-10-CM

## 2020-06-24 LAB — POCT GLUCOSE: 84 MG/DL (ref 70–110)

## 2020-06-24 PROCEDURE — 25500020 PHARM REV CODE 255: Mod: HCNC | Performed by: ANESTHESIOLOGY

## 2020-06-24 PROCEDURE — 82947 ASSAY GLUCOSE BLOOD QUANT: CPT | Mod: HCNC | Performed by: ANESTHESIOLOGY

## 2020-06-24 PROCEDURE — 64483 NJX AA&/STRD TFRM EPI L/S 1: CPT | Mod: 50

## 2020-06-24 PROCEDURE — 64483 PR EPIDURAL INJ, ANES/STEROID, TRANSFORAMINAL, LUMB/SACR, SNGL LEVL: ICD-10-PCS | Mod: 50,HCNC,, | Performed by: ANESTHESIOLOGY

## 2020-06-24 PROCEDURE — 25000003 PHARM REV CODE 250: Mod: HCNC | Performed by: ANESTHESIOLOGY

## 2020-06-24 PROCEDURE — 63600175 PHARM REV CODE 636 W HCPCS: Mod: HCNC | Performed by: ANESTHESIOLOGY

## 2020-06-24 PROCEDURE — 64483 NJX AA&/STRD TFRM EPI L/S 1: CPT | Mod: 50,HCNC,, | Performed by: ANESTHESIOLOGY

## 2020-06-24 RX ORDER — DEXAMETHASONE SODIUM PHOSPHATE 10 MG/ML
INJECTION INTRAMUSCULAR; INTRAVENOUS
Status: DISCONTINUED | OUTPATIENT
Start: 2020-06-24 | End: 2020-06-24 | Stop reason: HOSPADM

## 2020-06-24 RX ORDER — MIDAZOLAM HYDROCHLORIDE 1 MG/ML
INJECTION INTRAMUSCULAR; INTRAVENOUS
Status: DISCONTINUED | OUTPATIENT
Start: 2020-06-24 | End: 2020-06-24 | Stop reason: HOSPADM

## 2020-06-24 RX ORDER — LIDOCAINE HYDROCHLORIDE 10 MG/ML
INJECTION INFILTRATION; PERINEURAL
Status: DISCONTINUED | OUTPATIENT
Start: 2020-06-24 | End: 2020-06-24 | Stop reason: HOSPADM

## 2020-06-24 RX ORDER — LIDOCAINE HYDROCHLORIDE 5 MG/ML
INJECTION, SOLUTION INFILTRATION; INTRAVENOUS
Status: DISCONTINUED | OUTPATIENT
Start: 2020-06-24 | End: 2020-06-24 | Stop reason: HOSPADM

## 2020-06-24 RX ORDER — FENTANYL CITRATE 50 UG/ML
INJECTION, SOLUTION INTRAMUSCULAR; INTRAVENOUS
Status: DISCONTINUED | OUTPATIENT
Start: 2020-06-24 | End: 2020-06-24 | Stop reason: HOSPADM

## 2020-06-24 RX ORDER — SODIUM CHLORIDE 9 MG/ML
500 INJECTION, SOLUTION INTRAVENOUS CONTINUOUS
Status: DISCONTINUED | OUTPATIENT
Start: 2020-06-24 | End: 2020-06-24 | Stop reason: HOSPADM

## 2020-06-24 RX ADMIN — SODIUM CHLORIDE 500 ML: 0.9 INJECTION, SOLUTION INTRAVENOUS at 08:06

## 2020-06-24 NOTE — OP NOTE
Patient Name: Yanni Kaiser  MRN: [id number]    INFORMED CONSENT: The procedure, risks, benefits and options were discussed with patient. There are no contraindications to the procedure. The patient expressed understanding and agreed to proceed. The personnel performing the procedure was discussed. I verify that I personally obtained Yanni's consent prior to the start of the procedure and the signed consent can be found on the patient's chart.    Procedure Date: 06/24/2020    Anesthesia: Topical    Pre Procedure diagnosis: Lumbar radiculopathy [M54.16]  1. Lumbar radiculopathy    2. Spinal stenosis of lumbar region, unspecified whether neurogenic claudication present    3. Chronic pain      Post-Procedure diagnosis: SAME      Sedation: Yes - Fentanyl 50 mcg and Midazolam 2 mg  Sedation time: Less than 15 minutes    PROCEDURE:Bilateral L4/L5   TRANSFORAMINAL EPIDURAL STEROID INJECTION        DESCRIPTION OF PROCEDURE: The patient was brought to the procedure room. After performing time out IV access was obtained prior to the procedure. The patient was positioned prone on the fluoroscopy table. Continuous hemodynamic monitoring was initiated including blood pressure, EKG, and pulse oximetry. . The skin was prepped with chlorhexidine three times and draped in a sterile fashion. Skin anesthesia was achieved using 3 mL of lidocaine 1% over the respective injection site.     An oblique fluoroscopic view was obtained, with the superior articular process of the inferior vertebral body aligned with the pedicle. The tip of a 22-gauge 5-inch Quincke-type spinal needle was advanced toward the 6 oclock position of the L4 pedicle under intermittent fluoroscopic guidance. Confirmation of proper needle position was made with AP, oblique, and lateral fluoroscopic views. Negative aspiration for blood or CSF was confirmed. 2 mL of Omnipaque 300 was injected. Live fluoroscopic imaging revealed a clear outline of the spinal  nerve with proximal spread of agent through the neural foramen into the anterior epidural space. A total combination of 3 mL of Bupivacaine 0.25% and 10 mg decadron was injected at each level. Contrast spread was noted from L3 to L5 level. There was no pain on injection. The needle was removed and bleeding was nil.  A sterile dressing was applied. Efraintrice was taken back to the recovery room for further observation.     Blood Loss: Nill  Specimen: None    The procedure is considered time sensitive and medically-necessary given the patient's current clinical condition with moderate to severe pain, and/or significant functional impairment, and /or lack of alternative that present less risk of adverse event such as but not limited to a medication-related adverse event or care needed in the emergency department or hospitalization  due to inadequate pain relief.       Lawrence Marin MD

## 2020-06-24 NOTE — DISCHARGE SUMMARY
Discharge Note  Short Stay      SUMMARY     Admit Date: 6/24/2020    Attending Physician: Rodney Jeff      Discharge Physician: Rodney Jeff      Discharge Date: 6/24/2020 9:30 AM    Procedure(s) (LRB):  INJECTION, STEROID, EPIDURAL, TRANSFORAMINAL APPROACH (Bilateral)    Final Diagnosis: Lumbar radiculopathy [M54.16]    Disposition: Home or self care    Patient Instructions:   Current Discharge Medication List      CONTINUE these medications which have NOT CHANGED    Details   acetaZOLAMIDE (DIAMOX) 500 mg CpSR Take 1 capsule (500 mg total) by mouth 2 (two) times daily.  Qty: 360 capsule, Refills: 1    Associated Diagnoses: Papilledema associated with increased intracranial pressure      albuterol (PROVENTIL/VENTOLIN HFA) 90 mcg/actuation inhaler Inhale 2 puffs into the lungs every 6 (six) hours as needed. Rescue  Qty: 54 g, Refills: 0    Comments: **Patient requests 90 days supply**  Associated Diagnoses: Severe persistent asthma, unspecified whether complicated      albuterol-ipratropium (DUO-NEB) 2.5 mg-0.5 mg/3 mL nebulizer solution Take 3 mLs by nebulization every 6 (six) hours as needed for Wheezing or Shortness of Breath. Rescue  Qty: 100 mL, Refills: 3    Associated Diagnoses: Moderate persistent asthma with exacerbation      atorvastatin (LIPITOR) 40 MG tablet Take 1 tablet (40 mg total) by mouth once daily.  Qty: 90 tablet, Refills: 3    Associated Diagnoses: Mixed hyperlipidemia      blood sugar diagnostic Strp 1 each by Misc.(Non-Drug; Combo Route) route 4 (four) times daily before meals and nightly. ACCU CHEK ELVIA PLUS METER  Qty: 200 each, Refills: 3      blood-glucose meter (ACCU-CHEK ELVIA PLUS METER) Misc TEST FOUR TIMES DAILY BEFORE MEALS AND EVERY NIGHT  Qty: 90 each, Refills: 1    Associated Diagnoses: Type 2 diabetes mellitus with stage 3 chronic kidney disease, with long-term current use of insulin      budesonide-formoterol 160-4.5 mcg (SYMBICORT) 160-4.5 mcg/actuation HFAA Inhale 2  puffs into the lungs every 12 (twelve) hours. Controller  Qty: 1 Inhaler, Refills: 5    Associated Diagnoses: Severe persistent asthma, unspecified whether complicated      desloratadine (CLARINEX) 5 mg tablet Take 1 tablet (5 mg total) by mouth once daily.  Qty: 30 tablet, Refills: 11    Associated Diagnoses: Acute bacterial sinusitis      fluticasone propionate (FLONASE) 50 mcg/actuation nasal spray 1 spray (50 mcg total) by Each Nare route once daily.  Qty: 15.8 mL, Refills: 2    Associated Diagnoses: Environmental allergies; Chronic rhinitis      HYDROcodone-acetaminophen (NORCO) 5-325 mg per tablet Take 1 tablet by mouth every 8 (eight) hours as needed for Pain.  Qty: 21 tablet, Refills: 0    Comments: Quantity prescribed more than 7 day supply? No  Associated Diagnoses: Chronic bilateral low back pain with bilateral sciatica      lactulose (CHRONULAC) 10 gram/15 mL solution TAKE 15 MLS BY MOUTH THREE TIMES DAILY AS NEEDED  Qty: 473 mL, Refills: 6    Comments: **Patient requests 90 days supply**  Associated Diagnoses: Constipation, unspecified constipation type      lancets (ACCU-CHEK SOFTCLIX LANCETS) Misc 1 Device by Misc.(Non-Drug; Combo Route) route 4 (four) times daily.  Qty: 200 each, Refills: 3      lidocaine (LIDODERM) 5 % Place 1 patch onto the skin once daily. Remove & Discard patch within 12 hours or as directed by MD  Qty: 15 patch, Refills: 0      losartan (COZAAR) 100 MG tablet TAKE 1 TABLET(100 MG) BY MOUTH EVERY DAY  Qty: 90 tablet, Refills: 0    Associated Diagnoses: Benign essential HTN      metFORMIN (GLUCOPHAGE-XR) 500 MG XR 24hr tablet TAKE 2 TABLETS(1000 MG) BY MOUTH TWICE DAILY WITH MEALS  Qty: 360 tablet, Refills: 0      ondansetron (ZOFRAN) 8 MG tablet Take 1 tablet (8 mg total) by mouth every 8 (eight) hours as needed for Nausea.  Qty: 90 tablet, Refills: 5    Associated Diagnoses: Nausea and vomiting, intractability of vomiting not specified, unspecified vomiting type       pantoprazole (PROTONIX) 40 MG tablet Take 40 mg by mouth once daily.   Refills: 3      pregabalin (LYRICA) 50 MG capsule Take 1 capsule (50 mg total) by mouth 2 (two) times daily.  Qty: 60 capsule, Refills: 0      prucalopride 2 mg Tab Take 1 tablet by mouth once daily.  Qty: 90 tablet, Refills: 3    Associated Diagnoses: Chronic constipation      QUEtiapine (SEROQUEL) 25 MG Tab Take 1 tablet (25 mg total) by mouth once daily.  Qty: 90 tablet, Refills: 0    Associated Diagnoses: Severe episode of recurrent major depressive disorder, with psychotic features      semaglutide (OZEMPIC) 1 mg/dose (2 mg/1.5 mL) PnIj Inject 1 mg subq weekly.  Qty: 9 mL, Refills: 1    Associated Diagnoses: Type 2 diabetes mellitus with stage 3 chronic kidney disease, with long-term current use of insulin      sumatriptan (IMITREX) 50 MG tablet Take 1 tab at onset of headaches.  If no improvement in 2 hours, take another.  Do not take more than 2 tabs in 24 hours.  Qty: 9 tablet, Refills: 5    Associated Diagnoses: Chronic nonintractable headache, unspecified headache type      topiramate (TOPAMAX) 100 MG tablet TAKE 3 TABLETS BY MOUTH TWICE DAILY  Qty: 180 tablet, Refills: 11    Associated Diagnoses: Chronic nonintractable headache, unspecified headache type      venlafaxine (EFFEXOR-XR) 75 MG 24 hr capsule TAKE 1 CAPSULE(75 MG) BY MOUTH EVERY EVENING  Qty: 90 capsule, Refills: 3    Associated Diagnoses: IIH (idiopathic intracranial hypertension); Chronic nonintractable headache, unspecified headache type                 Discharge Diagnosis: Lumbar radiculopathy [M54.16]  Condition on Discharge: Stable with no complications to procedure   Diet on Discharge: Same as before.  Activity: as per instruction sheet.  Discharge to: Home with a responsible adult.  Follow up: 2-4 weeks       Please call my office or pager at [phone] if experienced any weakness or loss of sensation, fever > 101.5, pain uncontrolled with oral medications,  persistent nausea/vomiting/or diarrhea, redness or drainage from the incisions, or any other worrisome concerns. If physician on call was not reached or could not communicate with our office for any reason please go to the nearest emergency department

## 2020-06-24 NOTE — DISCHARGE INSTRUCTIONS

## 2020-06-24 NOTE — INTERVAL H&P NOTE
The patient has been examined and the H&P has been reviewed:    I concur with the findings and no changes have occurred since H&P was written.     Patient has tested negative to Covid-19 within past 48 hours.      Anesthesia/Surgery risks, benefits and alternative options discussed and understood by patient/family.          Active Hospital Problems    Diagnosis  POA    Chronic pain [G89.29]  Yes      Resolved Hospital Problems   No resolved problems to display.

## 2020-06-24 NOTE — PLAN OF CARE
PATIENT TOLERATED PROCEDURE WELL. PT COMPLAINS OF 9 /10 PAIN. ASSISTED PATIENT UP FOR FIRST TIME. STEADY ON FEET AND DISCHARGE INSTRUCTIONS GIVEN.

## 2020-06-25 ENCOUNTER — CLINICAL SUPPORT (OUTPATIENT)
Dept: REHABILITATION | Facility: HOSPITAL | Age: 50
End: 2020-06-25
Attending: ANESTHESIOLOGY
Payer: MEDICARE

## 2020-06-25 ENCOUNTER — TELEPHONE (OUTPATIENT)
Dept: PAIN MEDICINE | Facility: OTHER | Age: 50
End: 2020-06-25

## 2020-06-25 DIAGNOSIS — M53.86 DECREASED RANGE OF MOTION OF LUMBAR SPINE: ICD-10-CM

## 2020-06-25 DIAGNOSIS — M54.41 CHRONIC BILATERAL LOW BACK PAIN WITH BILATERAL SCIATICA: ICD-10-CM

## 2020-06-25 DIAGNOSIS — R29.898 WEAKNESS OF BOTH LOWER EXTREMITIES: ICD-10-CM

## 2020-06-25 DIAGNOSIS — G89.29 CHRONIC BILATERAL LOW BACK PAIN WITH BILATERAL SCIATICA: ICD-10-CM

## 2020-06-25 DIAGNOSIS — M54.42 CHRONIC BILATERAL LOW BACK PAIN WITH BILATERAL SCIATICA: ICD-10-CM

## 2020-06-25 PROCEDURE — 97110 THERAPEUTIC EXERCISES: CPT | Mod: HCNC,PN,CQ

## 2020-06-25 NOTE — PROGRESS NOTES
Physical Therapy Daily Treatment Note     Name: Yanni Kaiser  Clinic Number: [id number]    Therapy Diagnosis:   Encounter Diagnoses   Name Primary?    Chronic bilateral low back pain with bilateral sciatica     Weakness of both lower extremities     Decreased range of motion of lumbar spine      Physician: Lawrence Marin MD    Visit Date: 6/25/2020    Physician Orders: PT Eval and Treat   Medical Diagnosis from Referral: Chronic midline low back pain with bilateral sciatica  Evaluation Date: 6/11/2020  Authorization Period Expiration: 7/11/2020  Plan of Care Expiration: 9/11/2020  Visit # / Visits authorized:1/ 20 (2 Visit)     Time In: 1517 pm  Time Out: 1558 pm  Total Billable Time: 41 minutes     Precautions: Standard    Subjective     Pt reports: receiving a shot in B LSpine for pain yesterday and a little sore.  She was compliant with home exercise program.  Response to previous treatment: improving  Functional change: ongoing    Pain: 7/10  Location: bilateral back      Objective     Yanni received therapeutic exercises to develop strength, endurance, ROM, flexibility, posture and core stabilization for 41 minutes including:    NuStep x 5'  Supine HS Stretch 2x1'  LTR x 3'  DKTC x 3'  TrA activation   Sciatica nerve glides (Seated) x 15  Supine Piriformis Stretch 2x 1'  Supine Hip Abd Belt x 3'  Supine Hip Add Ball x 3'  Seated AAROM QL Ball x 30    Supine marching     Home Exercises Provided and Patient Education Provided     Education provided:   Cont to perform HEP as provided.     Written Home Exercises Provided: Patient instructed to cont prior HEP.  Exercises were reviewed and Yanni was able to demonstrate them prior to the end of the session.  Yanni demonstrated good  understanding of the education provided.     See EMR under Patient Instructions for exercises provided 06/11/2020.    Assessment     Pt presents to therapy with pain in B LSpine from receiving injections yesterday.   Incorporated stretching and AAROM for improved mobility with reduced symptoms.  Utilized TrA activation for improved stability with carryover to ADLs.  Pt tolerated well without increase in symptoms and noted reduced symptoms upon standing.    Yanni is progressing well towards her goals.   Pt prognosis is Fair.     Pt will continue to benefit from skilled outpatient physical therapy to address the deficits listed in the problem list box on initial evaluation, provide pt/family education and to maximize pt's level of independence in the home and community environment.     Pt's spiritual, cultural and educational needs considered and pt agreeable to plan of care and goals.    Anticipated barriers to physical therapy: co-morbidities    Goals:   Short Term Goals:  4 weeks  1. Report decreased in pain at worse less than  <   / =  5  /10  to increase tolerance for functional activities.On going  2. Increased B LE MMT 1/2  to increase tolerance for ADL and work activities.On going  3. Pt to tolerate HEP to improve ROM and independence with ADL's.On going  4. Pt to improve  Lumbar range of motion by 25% to allow for improved functional mobility to allow for improvement in IADLs. On going     Long Term Goals: 8 weeks  1. Report decreased in pain at worse less than  <   / =  3  /10  to increase tolerance for functional mobility.   2. Increased B LE MMT 1 grade to increase tolerance for ADL and work activities.  3. Pt will report at 52% or less limitation on FOTO Lumbar spine survey  to demonstrate decrease in disability and improvement in back pain.  4. Pt to be Independent with HEP to improve ROM and independence with ADL's  5. Pt to demonstrate negative Bridge Test in order to show improved core strength for lumbar stabilization.   6. Pt to improve Lumbar  range of motion by 75% to allow for improved functional mobility to allow for improvement in IADLs.     Plan     Plan of care Certification: 6/11/2020 to  9/11/2020     Outpatient Physical Therapy 2 times weekly for 10 weeks to include the following interventions: Manual Therapy, Moist Heat/ Ice, Neuromuscular Re-ed, Patient Education, Therapeutic Activites and Therapeutic Exercise.     Cont skilled PT session towards PT and patient's goals.    Tay Lloyd, PTA   06/25/2020

## 2020-06-29 ENCOUNTER — TELEPHONE (OUTPATIENT)
Dept: ENDOSCOPY | Facility: HOSPITAL | Age: 50
End: 2020-06-29

## 2020-06-30 ENCOUNTER — PATIENT MESSAGE (OUTPATIENT)
Dept: RHEUMATOLOGY | Facility: CLINIC | Age: 50
End: 2020-06-30

## 2020-06-30 ENCOUNTER — TELEPHONE (OUTPATIENT)
Dept: GASTROENTEROLOGY | Facility: CLINIC | Age: 50
End: 2020-06-30

## 2020-07-02 ENCOUNTER — OFFICE VISIT (OUTPATIENT)
Dept: PODIATRY | Facility: CLINIC | Age: 50
End: 2020-07-02
Payer: MEDICARE

## 2020-07-02 VITALS
SYSTOLIC BLOOD PRESSURE: 158 MMHG | DIASTOLIC BLOOD PRESSURE: 97 MMHG | HEIGHT: 65 IN | WEIGHT: 293 LBS | HEART RATE: 86 BPM | BODY MASS INDEX: 48.82 KG/M2

## 2020-07-02 DIAGNOSIS — E11.49 TYPE II DIABETES MELLITUS WITH NEUROLOGICAL MANIFESTATIONS: ICD-10-CM

## 2020-07-02 DIAGNOSIS — G89.4 CHRONIC PAIN SYNDROME: Primary | ICD-10-CM

## 2020-07-02 PROCEDURE — 3008F BODY MASS INDEX DOCD: CPT | Mod: HCNC,CPTII,S$GLB, | Performed by: PODIATRIST

## 2020-07-02 PROCEDURE — 3044F HG A1C LEVEL LT 7.0%: CPT | Mod: HCNC,CPTII,S$GLB, | Performed by: PODIATRIST

## 2020-07-02 PROCEDURE — 3077F SYST BP >= 140 MM HG: CPT | Mod: HCNC,CPTII,S$GLB, | Performed by: PODIATRIST

## 2020-07-02 PROCEDURE — 3080F PR MOST RECENT DIASTOLIC BLOOD PRESSURE >= 90 MM HG: ICD-10-PCS | Mod: HCNC,CPTII,S$GLB, | Performed by: PODIATRIST

## 2020-07-02 PROCEDURE — 99214 OFFICE O/P EST MOD 30 MIN: CPT | Mod: HCNC,S$GLB,, | Performed by: PODIATRIST

## 2020-07-02 PROCEDURE — 99999 PR PBB SHADOW E&M-EST. PATIENT-LVL IV: CPT | Mod: PBBFAC,HCNC,, | Performed by: PODIATRIST

## 2020-07-02 PROCEDURE — 99999 PR PBB SHADOW E&M-EST. PATIENT-LVL IV: ICD-10-PCS | Mod: PBBFAC,HCNC,, | Performed by: PODIATRIST

## 2020-07-02 PROCEDURE — 3044F PR MOST RECENT HEMOGLOBIN A1C LEVEL <7.0%: ICD-10-PCS | Mod: HCNC,CPTII,S$GLB, | Performed by: PODIATRIST

## 2020-07-02 PROCEDURE — 3077F PR MOST RECENT SYSTOLIC BLOOD PRESSURE >= 140 MM HG: ICD-10-PCS | Mod: HCNC,CPTII,S$GLB, | Performed by: PODIATRIST

## 2020-07-02 PROCEDURE — 3080F DIAST BP >= 90 MM HG: CPT | Mod: HCNC,CPTII,S$GLB, | Performed by: PODIATRIST

## 2020-07-02 PROCEDURE — 99214 PR OFFICE/OUTPT VISIT, EST, LEVL IV, 30-39 MIN: ICD-10-PCS | Mod: HCNC,S$GLB,, | Performed by: PODIATRIST

## 2020-07-02 PROCEDURE — 3008F PR BODY MASS INDEX (BMI) DOCUMENTED: ICD-10-PCS | Mod: HCNC,CPTII,S$GLB, | Performed by: PODIATRIST

## 2020-07-02 RX ORDER — OXYCODONE AND ACETAMINOPHEN 10; 325 MG/1; MG/1
1 TABLET ORAL EVERY 12 HOURS PRN
Qty: 40 TABLET | Refills: 0 | Status: SHIPPED | OUTPATIENT
Start: 2020-07-02 | End: 2020-08-03 | Stop reason: SDUPTHER

## 2020-07-06 ENCOUNTER — CLINICAL SUPPORT (OUTPATIENT)
Dept: URGENT CARE | Facility: CLINIC | Age: 50
End: 2020-07-06
Payer: MEDICARE

## 2020-07-06 VITALS — TEMPERATURE: 98 F

## 2020-07-06 DIAGNOSIS — K21.9 GASTROESOPHAGEAL REFLUX DISEASE, ESOPHAGITIS PRESENCE NOT SPECIFIED: ICD-10-CM

## 2020-07-06 PROCEDURE — U0003 INFECTIOUS AGENT DETECTION BY NUCLEIC ACID (DNA OR RNA); SEVERE ACUTE RESPIRATORY SYNDROME CORONAVIRUS 2 (SARS-COV-2) (CORONAVIRUS DISEASE [COVID-19]), AMPLIFIED PROBE TECHNIQUE, MAKING USE OF HIGH THROUGHPUT TECHNOLOGIES AS DESCRIBED BY CMS-2020-01-R: HCPCS | Mod: HCNC

## 2020-07-07 ENCOUNTER — TELEPHONE (OUTPATIENT)
Dept: PAIN MEDICINE | Facility: OTHER | Age: 50
End: 2020-07-07

## 2020-07-07 LAB — SARS-COV-2 RNA RESP QL NAA+PROBE: NOT DETECTED

## 2020-07-07 NOTE — PROGRESS NOTES
Subjective:      Patient ID: Yanni Kaiser is a 49 y.o. female.    Chief Complaint: Diabetes Mellitus (06/12/2020 dr wendi ryan) and Diabetic Foot Exam    She is here for follow-up bilateral foot pain.  She previously had a plantar fasciectomy.  She was doing quite well for some time and had a recent new injury.  She also was complaining of diffuse muscle pain throughout upper and lower extremities.  These body aches and pains which are migratory seemed to be ongoing and not improving.  Review of Systems   Constitution: Negative for chills, fever and malaise/fatigue.   HENT: Negative for hearing loss.    Cardiovascular: Negative for claudication.   Respiratory: Negative for shortness of breath.    Skin: Negative for flushing and rash.   Musculoskeletal: Negative for joint pain and myalgias.        Pain to both feet   Neurological: Negative for loss of balance, numbness, paresthesias and sensory change.   Psychiatric/Behavioral: Negative for altered mental status.           Objective:      Physical Exam  Constitutional:       Appearance: She is well-developed.   Cardiovascular:      Pulses:           Dorsalis pedis pulses are 2+ on the right side and 2+ on the left side.        Posterior tibial pulses are 2+ on the right side and 2+ on the left side.   Musculoskeletal:      Right knee: She exhibits no swelling and no ecchymosis.      Left knee: She exhibits no swelling and no ecchymosis.      Right ankle: She exhibits normal range of motion, no swelling, no ecchymosis and normal pulse. No lateral malleolus, no medial malleolus and no head of 5th metatarsal tenderness found. Achilles tendon exhibits no pain, no defect and normal Copeland's test results.      Left ankle: She exhibits normal range of motion, no swelling, no ecchymosis and normal pulse. No lateral malleolus, no medial malleolus and no head of 5th metatarsal tenderness found. Achilles tendon exhibits no pain and normal Copeland's test results.       Right lower leg: She exhibits no tenderness, no bony tenderness, no swelling and no deformity. No edema.      Left lower leg: She exhibits no tenderness and no swelling. No edema.      Right foot: Normal range of motion. No deformity.      Left foot: Normal range of motion. No deformity.      Comments: Pain along the plantar aspect of the left foot. Maximal tenderness is along the small plantar fibroma at the distal medial band of the plantar fascia. Tenderness tracking proximally along the medial band and into the tarsal tunnel.    Right foot has tenderness along the plantar aspect of the foot and into the tarsal tunnel.  In addition, she has tenderness overlying the anterior medial anterior lateral and posterior compartments of both lower extremities.  Is tender with pinpoint palpation.   Feet:      Right foot:      Protective Sensation: 5 sites tested. 5 sites sensed.      Left foot:      Protective Sensation: 5 sites tested. 5 sites sensed.   Skin:     General: Skin is warm.      Capillary Refill: Capillary refill takes more than 3 seconds.      Findings: No abrasion, bruising, burn or ecchymosis.      Comments: No open lesions noted to b/L lower extremities.     Examination of the skin reveals no evidence of significant rashes, open lesions, suspicious appearing nevi or other concerning lesions.      Neurological:      Mental Status: She is alert and oriented to person, place, and time.      Comments: Gross sensation intact to b/L lower extremities  (+) Tinel's sign: L PT, DP and R SP, DP, PT  Light touch intact.  MMT 5/5 DF, PF, Inv, Ev  Normal muscle tone.  No signs of atrophy.  No tremor or spasticity.     Psychiatric:         Attention and Perception: She is attentive.         Speech: Speech normal.         Behavior: Behavior normal.               Assessment:       Encounter Diagnoses   Name Primary?    Chronic pain syndrome Yes    Type II diabetes mellitus with neurological manifestations           Plan:       Yanni was seen today for diabetes mellitus and diabetic foot exam.    Diagnoses and all orders for this visit:    Chronic pain syndrome    Type II diabetes mellitus with neurological manifestations    Other orders  -     oxyCODONE-acetaminophen (PERCOCET)  mg per tablet; Take 1 tablet by mouth every 12 (twelve) hours as needed for Pain.      I counseled the patient on her conditions, their implications and medical management.    Follow-up with pain management as scheduled regarding lumbar MRI.  Follow-up and 4-6 weeks.    .

## 2020-07-08 ENCOUNTER — TELEPHONE (OUTPATIENT)
Dept: ENDOSCOPY | Facility: HOSPITAL | Age: 50
End: 2020-07-08

## 2020-07-10 ENCOUNTER — OFFICE VISIT (OUTPATIENT)
Dept: RHEUMATOLOGY | Facility: CLINIC | Age: 50
End: 2020-07-10
Payer: MEDICARE

## 2020-07-10 ENCOUNTER — TELEPHONE (OUTPATIENT)
Dept: INTERNAL MEDICINE | Facility: CLINIC | Age: 50
End: 2020-07-10

## 2020-07-10 VITALS
SYSTOLIC BLOOD PRESSURE: 145 MMHG | WEIGHT: 293 LBS | TEMPERATURE: 99 F | HEART RATE: 77 BPM | HEIGHT: 64 IN | BODY MASS INDEX: 50.02 KG/M2 | OXYGEN SATURATION: 96 % | DIASTOLIC BLOOD PRESSURE: 91 MMHG

## 2020-07-10 DIAGNOSIS — M79.7 FIBROMYALGIA: Primary | ICD-10-CM

## 2020-07-10 DIAGNOSIS — Z55.9 EDUCATIONAL CIRCUMSTANCE: ICD-10-CM

## 2020-07-10 DIAGNOSIS — E66.01 CLASS 3 SEVERE OBESITY WITH BODY MASS INDEX (BMI) OF 50.0 TO 59.9 IN ADULT, UNSPECIFIED OBESITY TYPE, UNSPECIFIED WHETHER SERIOUS COMORBIDITY PRESENT: ICD-10-CM

## 2020-07-10 DIAGNOSIS — G43.809 OTHER MIGRAINE WITHOUT STATUS MIGRAINOSUS, NOT INTRACTABLE: ICD-10-CM

## 2020-07-10 DIAGNOSIS — M51.36 DDD (DEGENERATIVE DISC DISEASE), LUMBAR: ICD-10-CM

## 2020-07-10 PROCEDURE — 99214 PR OFFICE/OUTPT VISIT, EST, LEVL IV, 30-39 MIN: ICD-10-PCS | Mod: HCNC,S$GLB,, | Performed by: INTERNAL MEDICINE

## 2020-07-10 PROCEDURE — 3077F SYST BP >= 140 MM HG: CPT | Mod: HCNC,CPTII,S$GLB, | Performed by: INTERNAL MEDICINE

## 2020-07-10 PROCEDURE — 3080F PR MOST RECENT DIASTOLIC BLOOD PRESSURE >= 90 MM HG: ICD-10-PCS | Mod: HCNC,CPTII,S$GLB, | Performed by: INTERNAL MEDICINE

## 2020-07-10 PROCEDURE — 3077F PR MOST RECENT SYSTOLIC BLOOD PRESSURE >= 140 MM HG: ICD-10-PCS | Mod: HCNC,CPTII,S$GLB, | Performed by: INTERNAL MEDICINE

## 2020-07-10 PROCEDURE — 3080F DIAST BP >= 90 MM HG: CPT | Mod: HCNC,CPTII,S$GLB, | Performed by: INTERNAL MEDICINE

## 2020-07-10 PROCEDURE — 99999 PR PBB SHADOW E&M-EST. PATIENT-LVL V: CPT | Mod: PBBFAC,HCNC,, | Performed by: INTERNAL MEDICINE

## 2020-07-10 PROCEDURE — 3008F PR BODY MASS INDEX (BMI) DOCUMENTED: ICD-10-PCS | Mod: HCNC,CPTII,S$GLB, | Performed by: INTERNAL MEDICINE

## 2020-07-10 PROCEDURE — 99214 OFFICE O/P EST MOD 30 MIN: CPT | Mod: HCNC,S$GLB,, | Performed by: INTERNAL MEDICINE

## 2020-07-10 PROCEDURE — 3008F BODY MASS INDEX DOCD: CPT | Mod: HCNC,CPTII,S$GLB, | Performed by: INTERNAL MEDICINE

## 2020-07-10 PROCEDURE — 99999 PR PBB SHADOW E&M-EST. PATIENT-LVL V: ICD-10-PCS | Mod: PBBFAC,HCNC,, | Performed by: INTERNAL MEDICINE

## 2020-07-10 SDOH — SOCIAL DETERMINANTS OF HEALTH (SDOH): PROBLEMS RELATED TO EDUCATION AND LITERACY, UNSPECIFIED: Z55.9

## 2020-07-10 NOTE — ADDENDUM NOTE
Addended by: MICHELLE GREENBERG on: 3/7/2019 04:28 PM     Modules accepted: Orders     Getting better

## 2020-07-10 NOTE — TELEPHONE ENCOUNTER
Staff called to confirm 07/14/20 9 am in office visit with Laverne Taylor Np and to inform the patient of the above information:     -Please arrive 15 minutes prior to appointment time  -Please call [phone] upon arriving to building   -No Non essential visitors due to limited seating in waiting area  -Essential visitors will be seated in chairs outside of clinic until patient exam is complete.   -Do you require assistance? Inform staff   -Stop at temp station/ get mask if you do not have one (Ok to wear your own)      Patient confirmed and verbalized understanding and expressed thanks for the call.

## 2020-07-10 NOTE — PROGRESS NOTES
Subjective:       Patient ID: Yanni Kaiser is a 49 y.o. female.    Chief Complaint: muscle aches, arthralgias and pain in her hands (palms) and bottom of feet  HPI   Pt is a 49 year old female with PMH of asthma, DM type 2 with neuropathy, GERD, HLD, HTN, pseudotumor cerebri (2002), seizures (2002), migraines, sleep apnea (not on CPAP) who presented initially for muscle aches in her b/l arms and legs over the last two years associated with numbness and pain (aching and stabbing at times) in her b/l palms and intermittent finger pain as well as pain on the bottom of her feet which has also been going on for the last two years.  Work up in 2018 was revealing for fibromyalgia and lumbar spinal stenosis and moderate neuroforaminal narrowing of L4-L5 on MRI lumbar spine.  Her labs at that time in 2018 showed negative JASON, negative SSA, negative RF/CCP, normal spep/JUNAID, normal cpk, normal aldolase, urine without proteinuria or hematuria.  Pt returned to clinic on 5/20/2020 for lower back pain.      Interval history:  Pt complaining of headache currently and lower back pain.  She also has left foot pain. She rates her pain a 10/10.  She has had injection in lower back on 6/24/2020 with only 1-2 days of relief.  She then had return of her lower back pain return and her shooting pains down her legs. Pt is seeing pain management on 7/14/2020 for follow up.    Her headache is worse with loud noise, light and she has nausea with them.  She has had migraines for years. She takes imitrex for her headache but only gets brief relief.    She was diagnosed with shingles after her prior visit in May 2020 and her rash has resolved but she still has severe pain on the area where her rash was.    Pt is currently on Lyrica 50mg BID by pain management but she feels dizzy on it at times. Pt cant recall ever being on cymbalta.    Pt previously saw Dr Johnson for her sleep apnea    Pt also complaining of all over body pain in her  arms and legs with sensitivity to touch.  Pt has stiffness in the morning lasting about 2 hours. She stated her hands and legs are swollen when she wakes up.  Pt has not been on cymbalta or lyrica before.      Pt has been seeing Dr Salcedo.    Pt has not done physical therapy for her lower back pain.  [facility], she stopped seeing him at the end of 2019.  She was given an injection in her back but per pt she was told that he would not give her any more injections until she lost weight.  Pt does recall that her back pain improved a little bit with the prior injection.    Initial HPI done 8/8/2018: Pt also admitted to pain in her b/l buttocks as well as lower back associated with shooting pain down her back and legs and knee pain.  The lower back pain has been ongoing for the last 2-3 years associated with sciatica pain at times and she has had incontinence for the last few years as well.  She currently has an MRI lumbar spine pending ordered by her neurologist.   Pt rates the pain a 10/10, with little relief percocet.  Meloxicam and zanaflex prescribed by Dr Lewis helps minimally as well.  Pt admits to head to toe pain.  Pt admitted to swelling of all of the joints over the last 2-3 years with pain, intermittently one or two joints may be swollen.  She admitted to stiffness in her shoulder and neck. Patient admitted to morning stiffness lasting 3-4 hours and stated her pain in general is worse at night.  The stiffness has also been going on for the last 2-3 years. Pt admitted to feeling food gets stuck in her mid esophagus, pt was informed she has slow moving bowels and she has constipation. Pt sees GI Dr Pickett at [facility]. Pt admitted to intermittent chest pain over the past few months. Pt also admits to NOWAK with her asthma during the hot weather.  She has blurry vision of her left eye and sees double at times associated with pain at times, not associated  with a migraine. Pt also has itchy red eyes which come and go at times. No eye redness or pain currently. Pt is currently on doxycycline for abscess under right breast and sinusitis. Pt admitted to getting about 2-3 hours of sleep a night.  Wakes unrefreshed and feels fatigued. Pt used to take gabapentin for her diabetic neuropathy however she was taken off of the gabapentin a few months ago and admitted the pain is the same.   [facility] [place] and she takes percocet.  Pt has been seeing him for the last 6 months.  Pt has not had any injections done at pain management. Pt had dexamethasone injection in her left ankle done by Podiatry Dr Barnes yesterday and previously had her right ankle injected by Dr Perera a podiatrist on the [facility].    Pts last menstrual cycle was about 3 years ago.  She has never had a miscarriage, has 4 children all full term and healthy. Pt is a current smoker for over 20 years, 1/2 ppd. No drinking or drugs.  Pt has no family history of lupus, RA, scleroderma, sarcoidosis, sjogrens, myositis, psoriais, UC or Crohns.    Review of Systems   Constitutional: Positive for fatigue. Negative for chills, fever and unexpected weight change.   HENT: Positive for trouble swallowing. Negative for congestion, facial swelling, hearing loss, mouth sores and nosebleeds.    Eyes: Negative for pain, redness and visual disturbance.   Respiratory: Negative for cough and shortness of breath.    Cardiovascular: Negative for chest pain.   Gastrointestinal: Positive for nausea. Negative for abdominal pain, constipation, diarrhea and vomiting.   Genitourinary: Negative for difficulty urinating, dysuria, genital sores and hematuria.   Musculoskeletal: Positive for arthralgias, back pain, joint swelling, myalgias, neck pain and neck stiffness.   Skin: Negative for color change and rash.   Neurological: Positive for seizures, weakness, numbness and headaches.  "  Hematological: Does not bruise/bleed easily.   Psychiatric/Behavioral: Positive for sleep disturbance. Negative for suicidal ideas. The patient is nervous/anxious.            Objective:   BP (!) 145/91   Pulse 77   Temp 98.9 °F (37.2 °C)   Ht 5' 4" (1.626 m)   Wt (!) 149.9 kg (330 lb 6.4 oz)   SpO2 96%   BMI 56.71 kg/m²      Physical Exam   Constitutional: She is oriented to person, place, and time.   HENT:   Head: Normocephalic and atraumatic.   Right Ear: External ear normal.   Left Ear: External ear normal.   Mouth/Throat: Oropharynx is clear and moist. No oropharyngeal exudate.   Eyes: Conjunctivae and EOM are normal. Pupils are equal, round, and reactive to light. Right eye exhibits no discharge. Left eye exhibits no discharge. No scleral icterus.   Neck: Neck supple. No tracheal deviation present.   Cardiovascular: Normal rate, regular rhythm and normal heart sounds.  Exam reveals no gallop and no friction rub.    No murmur heard.  Pulmonary/Chest: Effort normal and breath sounds normal. No stridor. She has no wheezes. She has no rales.   Abdominal: Soft. Bowel sounds are normal. She exhibits no distension. There is no abdominal tenderness.   Neurological: She is alert and oriented to person, place, and time.   Skin: Skin is warm and dry. No rash noted. No erythema.     Musculoskeletal: Tenderness present. No edema.      Comments: No active synovitis, enthesitis, dactylitis or effusion noted on exam    +tenderness of paraspinal muscles lumbar spine.    18/18 fibromyalgia tenderpoints    Patient with tenderness of proximal and distal upper and lower extremities at nonspecific areas              Ref. Range 6/19/2018 06:55   WBC Latest Ref Range: 3.90 - 12.70 K/uL 5.56   RBC Latest Ref Range: 4.00 - 5.40 M/uL 4.15   Hemoglobin Latest Ref Range: 12.0 - 16.0 g/dL 11.3 (L)   Hematocrit Latest Ref Range: 37.0 - 48.5 % 35.7 (L)   MCV Latest Ref Range: 82 - 98 fL 86   MCH Latest Ref Range: 27.0 - 31.0 pg 27.2 "   MCHC Latest Ref Range: 32.0 - 36.0 g/dL 31.7 (L)   RDW Latest Ref Range: 11.5 - 14.5 % 16.8 (H)   Platelets Latest Ref Range: 150 - 350 K/uL 328   MPV Latest Ref Range: 9.2 - 12.9 fL 9.1 (L)   Gran% Latest Ref Range: 38.0 - 73.0 % 39.5   Gran # (ANC) Latest Ref Range: 1.8 - 7.7 K/uL 2.2   Lymph% Latest Ref Range: 18.0 - 48.0 % 40.5   Lymph # Latest Ref Range: 1.0 - 4.8 K/uL 2.3   Mono% Latest Ref Range: 4.0 - 15.0 % 9.4   Mono # Latest Ref Range: 0.3 - 1.0 K/uL 0.5   Eosinophil% Latest Ref Range: 0.0 - 8.0 % 9.7 (H)   Eos # Latest Ref Range: 0.0 - 0.5 K/uL 0.5   Basophil% Latest Ref Range: 0.0 - 1.9 % 0.9   Baso # Latest Ref Range: 0.00 - 0.20 K/uL 0.05      Ref. Range 6/19/2018 06:55   Sodium Latest Ref Range: 136 - 145 mmol/L 142   Potassium Latest Ref Range: 3.5 - 5.1 mmol/L 3.8   Chloride Latest Ref Range: 95 - 110 mmol/L 113 (H)   CO2 Latest Ref Range: 23 - 29 mmol/L 19 (L)   Anion Gap Latest Ref Range: 8 - 16 mmol/L 10   BUN, Bld Latest Ref Range: 6 - 20 mg/dL 17   Creatinine Latest Ref Range: 0.5 - 1.4 mg/dL 1.2   eGFR if non African American Latest Ref Range: >60 mL/min/1.73 m^2 55 (A)   eGFR if African American Latest Ref Range: >60 mL/min/1.73 m^2 >60   Glucose Latest Ref Range: 70 - 110 mg/dL 106   Calcium Latest Ref Range: 8.7 - 10.5 mg/dL 8.9   Alkaline Phosphatase Latest Ref Range: 55 - 135 U/L 70   Total Protein Latest Ref Range: 6.0 - 8.4 g/dL 6.8   Albumin Latest Ref Range: 3.5 - 5.2 g/dL 3.1 (L)   Total Bilirubin Latest Ref Range: 0.1 - 1.0 mg/dL 0.4   AST Latest Ref Range: 10 - 40 U/L 11   ALT Latest Ref Range: 10 - 44 U/L 11     Assessment:       1. Fibromyalgia    2. Other migraine without status migrainosus, not intractable    3. DDD (degenerative disc disease), lumbar    4. Class 3 severe obesity with body mass index (BMI) of 50.0 to 59.9 in adult, unspecified obesity type, unspecified whether serious comorbidity present    5. Educational circumstance        Pt is a 49 year old female with  PMH of asthma, DM type 2 with neuropathy, GERD, HLD, HTN, pseudotumor cerebri (2002), seizures (2002), migraines, sleep apnea (not on CPAP) who presented initially for muscle aches in her b/l arms and legs over the last two years associated with numbness and pain (aching and stabbing at times) in her b/l palms and intermittent finger pain as well as pain on the bottom of her feet which has also been going on for the last two years.  Work up in 2018 was revealing for fibromyalgia and lumbar spinal stenosis and moderate neuroforaminal narrowing of L4-L5 on MRI lumbar spine.  Her labs at that time in 2018 showed negative JASON, negative SSA, negative RF/CCP, normal spep/JUNAID, normal cpk, normal aldolase, urine without proteinuria or hematuria.  Pt returned to clinic on 5/20/2020 for lower back pain.      History and exam consistent with fibromyalgia and DDD of lumbar spine. No acute synovitis on exam to explain elevated CRP and sed rate.    Plan:   -refer to neurology for headaches.  -educated pt on fibromylgia  -referral to PMR for fibromyalgia  - follow up with Dr Salcedo for continued weight loss  -follow up with pain management for lower back pain, consider transitioning lyrica to cymbalta given pt not tolerating lyrica well however will defer to pain management.  -pt to RTC PRN.    1. Fibromyalgia  - Ambulatory referral/consult to Physical Medicine Rehab; Future    2. Other migraine without status migrainosus, not intractable  - Ambulatory referral/consult to Neurology; Future    3. DDD (degenerative disc disease), lumbar    4. Class 3 severe obesity with body mass index (BMI) of 50.0 to 59.9 in adult, unspecified obesity type, unspecified whether serious comorbidity present    5. Educational circumstance

## 2020-07-13 ENCOUNTER — TELEPHONE (OUTPATIENT)
Dept: PAIN MEDICINE | Facility: CLINIC | Age: 50
End: 2020-07-13

## 2020-07-13 NOTE — TELEPHONE ENCOUNTER
Staff spoke with patient to confirm appointment scheduled 7/14/20 at 9 am as in office visit with Laverne Roberts  and to inform patient of direct line to call upon arriving to the clinic also visitor policy patient verbalized understanding.

## 2020-07-14 ENCOUNTER — OFFICE VISIT (OUTPATIENT)
Dept: PAIN MEDICINE | Facility: CLINIC | Age: 50
End: 2020-07-14
Payer: MEDICARE

## 2020-07-14 ENCOUNTER — TELEPHONE (OUTPATIENT)
Dept: SLEEP MEDICINE | Facility: CLINIC | Age: 50
End: 2020-07-14

## 2020-07-14 VITALS
TEMPERATURE: 98 F | HEIGHT: 64 IN | DIASTOLIC BLOOD PRESSURE: 87 MMHG | RESPIRATION RATE: 18 BRPM | BODY MASS INDEX: 50.02 KG/M2 | HEART RATE: 73 BPM | WEIGHT: 293 LBS | SYSTOLIC BLOOD PRESSURE: 156 MMHG

## 2020-07-14 DIAGNOSIS — M54.16 LUMBAR RADICULOPATHY: Primary | ICD-10-CM

## 2020-07-14 DIAGNOSIS — Z78.9 DECREASED ACTIVITIES OF DAILY LIVING (ADL): ICD-10-CM

## 2020-07-14 DIAGNOSIS — M48.061 SPINAL STENOSIS OF LUMBAR REGION, UNSPECIFIED WHETHER NEUROGENIC CLAUDICATION PRESENT: ICD-10-CM

## 2020-07-14 PROCEDURE — 99213 PR OFFICE/OUTPT VISIT, EST, LEVL III, 20-29 MIN: ICD-10-PCS | Mod: HCNC,S$GLB,, | Performed by: NURSE PRACTITIONER

## 2020-07-14 PROCEDURE — 3077F PR MOST RECENT SYSTOLIC BLOOD PRESSURE >= 140 MM HG: ICD-10-PCS | Mod: HCNC,CPTII,S$GLB, | Performed by: NURSE PRACTITIONER

## 2020-07-14 PROCEDURE — 99499 RISK ADDL DX/OHS AUDIT: ICD-10-PCS | Mod: HCNC,S$GLB,, | Performed by: NURSE PRACTITIONER

## 2020-07-14 PROCEDURE — 3077F SYST BP >= 140 MM HG: CPT | Mod: HCNC,CPTII,S$GLB, | Performed by: NURSE PRACTITIONER

## 2020-07-14 PROCEDURE — 3008F PR BODY MASS INDEX (BMI) DOCUMENTED: ICD-10-PCS | Mod: HCNC,CPTII,S$GLB, | Performed by: NURSE PRACTITIONER

## 2020-07-14 PROCEDURE — 99213 OFFICE O/P EST LOW 20 MIN: CPT | Mod: HCNC,S$GLB,, | Performed by: NURSE PRACTITIONER

## 2020-07-14 PROCEDURE — 3008F BODY MASS INDEX DOCD: CPT | Mod: HCNC,CPTII,S$GLB, | Performed by: NURSE PRACTITIONER

## 2020-07-14 PROCEDURE — 3079F PR MOST RECENT DIASTOLIC BLOOD PRESSURE 80-89 MM HG: ICD-10-PCS | Mod: HCNC,CPTII,S$GLB, | Performed by: NURSE PRACTITIONER

## 2020-07-14 PROCEDURE — 99999 PR PBB SHADOW E&M-EST. PATIENT-LVL V: CPT | Mod: PBBFAC,HCNC,, | Performed by: NURSE PRACTITIONER

## 2020-07-14 PROCEDURE — 99999 PR PBB SHADOW E&M-EST. PATIENT-LVL V: ICD-10-PCS | Mod: PBBFAC,HCNC,, | Performed by: NURSE PRACTITIONER

## 2020-07-14 PROCEDURE — 99499 UNLISTED E&M SERVICE: CPT | Mod: HCNC,S$GLB,, | Performed by: NURSE PRACTITIONER

## 2020-07-14 PROCEDURE — 3079F DIAST BP 80-89 MM HG: CPT | Mod: HCNC,CPTII,S$GLB, | Performed by: NURSE PRACTITIONER

## 2020-07-14 NOTE — TELEPHONE ENCOUNTER
----- Message from Cuate Martinez MA sent at 7/10/2020  3:25 PM CDT -----  Ms Job Pedroza is scheduled to [place] on 09/02/2020. Do you all have anything sooner available?

## 2020-07-14 NOTE — PROGRESS NOTES
Chronic Pain - Follow Up    Referring Physician: No ref. provider found    Chief Complaint:   Chief Complaint   Patient presents with    Generalized Body Aches     fibromyalgia    Low-back Pain        SUBJECTIVE:    Interval History 7/14/2020:  The patient presents for follow up of lower back and leg pain.  She is s/p bilateral L4/5 TF NAKITA on 6/24/20 with limited benefit of leg pain.  She denies any respiratory changes such as fever, cough, or shortness of breath.  She continues to report pain that starts across the lower back with radiation down the side of both legs.  She has associated numbness and tingling to both legs.  Her pain worsens when she walks and when she is active.  She says that she had to PT appointments did not have follow-up appointments made.  The previous note indicates that she is to continue with PT she is seeing Snow [facility]azo for chronic pain as occasion.  She says that she has been taking Lyrica at night that is making her wake up in the middle of the night with dizziness.  She stops it as instructed by PCP.  She has an appointment with her neurologist tomorrow.  Her pain today is 10/10.    Previous Encounter:  Yanni Kaiser presents to the clinic for the evaluation of lower back pain. The pain started 3 years ago following no specific incident and symptoms have been worsening.The pain is located in the lumbar area and radiates to the left leg mostly but also into the right leg. Non-specific dermatomal distribution.  The pain is described as aching, sharp and shooting and is rated as 10/10. The pain is rated with a score of  10/10 on the BEST day and a score of 10/10 on the WORST day.  Symptoms interfere with daily activity and sleeping. The pain is exacerbated by Sitting, Walking, Lifting and Getting out of bed/chair.  The pain is mitigated by heat, ice, medications and physical therapy. She reports spending 4 hours per day reclining. The patient reports 0 hours of uninterrupted  sleep per night.    Patient denies none.    Physical Therapy/Home Exercise: yes      Pain Disability Index Review:  Last 3 PDI Scores 7/14/2020 6/9/2020   Pain Disability Index (PDI) 64 69       Pain Medications:    - Opioids: Percocet (Oxycodone/Acetaminophen)  - Adjuvant Medications: Advil,Motrin ( Ibuprofen)   Lidoderm patch, without benefit  Voltaren gel, without benefit  Gabapentin, side effects  Lamotrigine     report:  Reviewed and consistent with medication use as prescribed.    Pain Procedures:   6/4/20 Bilateral L4/5 TF NAKITA    Imaging:   Narrative     EXAMINATION:  MRI LUMBAR SPINE WITHOUT CONTRAST    CLINICAL HISTORY:  spinal stenosis; Lumbago with sciatica, right side    TECHNIQUE:  Multiplanar, multisequence MR images were acquired from the thoracolumbar junction to the sacrum without contrast.    COMPARISON:  MRI lumbar spine 08/28/2018    FINDINGS:  Straightening of the normal lumbar lordosis.  Mild grade 1 retrolisthesis L5 on S1    Vertebral body heights are maintained.  Several T1 and T2 hyperintense lesions within the L2 and L3 vertebral body favored to represent osseous hemangiomas.  Otherwise normal marrow signal.  No fracture.    Multilevel partial disc desiccation throughout the lumbar spine.  Posterior annular fissuring at L2-L3.    Distal spinal cord is unremarkable.  Conus terminates at L1-L2.    Degenerative findings:    T12-L1: No disc herniation, spinal canal stenosis, or neural foraminal narrowing.    L1-L2: No disc herniation, spinal canal stenosis, or neural foraminal narrowing.    L2-L3: No disc herniation, spinal canal stenosis or neural foraminal narrowing.    L3-L4: Mild circumferential disc bulge without spinal canal stenosis or neural foraminal narrowing.    L4-L5: Circumferential disc bulge, facet arthropathy, and ligamentum flavum hypertrophy resulting in moderate spinal canal stenosis and moderate bilateral neural foraminal narrowing.    L5-S1: Circumferential disc bulge  and facet arthropathy without spinal canal stenosis or neural foraminal narrowing.    Paraspinal muscles and soft tissues are unremarkable.      Impression       Multilevel lumbar spondylosis most prominent at L4-5 resulting in moderate spinal canal stenosis and moderate bilateral neural foraminal narrowing.  Findings have slightly progressed in comparison to prior study dated 2018.    Electronically signed by resident: Fady Reese  Date: 2020  Time: 08:27    Electronically signed by: Abelardo Gold MD  Date: 2020  Time: 08:57         Past Medical History:   Diagnosis Date    Allergy     Anemia     Asthma     Back pain     Chronic bronchitis     Cigarette smoker     DDD (degenerative disc disease), lumbar 2020    Depression     Diabetes with neurologic complications     DUB (dysfunctional uterine bleeding) 10/16/2018    GERD (gastroesophageal reflux disease)     High cholesterol     Hyperprolactinemia     Hypertension     Influenza A 2020    Obese body habitus     Pseudotumor cerebri     Renal manifestation of secondary diabetes mellitus     Respiratory failure     Seizures     Simple endometrial hyperplasia     Sleep apnea     Smoker     Tobacco dependence      Past Surgical History:   Procedure Laterality Date    BREAST CYST ASPIRATION       SECTION      X 1    CHOLECYSTECTOMY      COLONOSCOPY      COLONOSCOPY N/A 2019    Procedure: COLONOSCOPY;  Surgeon: Jagruti Sweeney MD;  Location: [facility] ([address] [place] [address]);  Service: Endoscopy;  Laterality: N/A;  BMI is 63/gastroparesis/3 days full liquid diet 1 day clear liquid see telephone encounter by Ciara limon    ESOPHAGEAL MANOMETRY WITH MEASUREMENT OF IMPEDANCE N/A 2019    Procedure: MANOMETRY-ESOPHAGEAL-WITH IMPEDANCE;  Surgeon: Jagruti Sweeney MD;  Location: [facility] ([address] [place] [address]);  Service: Endoscopy;  Laterality: N/A;  Hold Narcotics x 1 days   Hold TCA x 1 days  Propofol only.  No fentanyl or benzodiazepine during sedation. If additional sedation needed, discuss with Dr. Sweeney.  10/29 - pt confirmed appt    ESOPHAGOGASTRODUODENOSCOPY N/A 1/14/2019    Procedure: EGD (ESOPHAGOGASTRODUODENOSCOPY);  Surgeon: Jagruti Sweeney MD;  [address]  Service: Endoscopy;  Laterality: N/A;  BMI is 63/gastroparesis/3 days full liquid diet 1 day clear liquid see telephone encounter by Ciara Brown     ESOPHAGOGASTRODUODENOSCOPY N/A 11/5/2019    Procedure: ESOPHAGOGASTRODUODENOSCOPY (EGD);  Surgeon: Jagruti Sweeney MD;  [facility]  Service: Endoscopy;  Laterality: N/A;  EGD with EndoFlip /+/- Botox  2nd floor for gastroparesis/BMI 63 **367lbs**  Bariatric Stretcher needed  Manometry probe to be placed endoscopically during EGD procedure  Due to change in protocol, Full liquid diet for 3 days/ 1 day of clear liquids  Hi    FOOT FRACTURE SURGERY Left     HYSTEROSCOPY WITH DILATION AND CURETTAGE OF UTERUS N/A 10/16/2018    [facility]    PLANTAR FASCIOTOMY Left 11/18/2019    Procedure: FASCIOTOMY, PLANTAR;  Surgeon: Valentino Orourke DPM;  [facility];  Service: Podiatry;  Laterality: Left;    RETINAL LASER PROCEDURE Left     SINUS SURGERY      TRANSFORAMINAL EPIDURAL INJECTION OF STEROID Bilateral 6/24/2020    Procedure: INJECTION, STEROID, EPIDURAL, TRANSFORAMINAL APPROACH;  Surgeon: Lawrence Marni MD;  Location: [facility];  Service: Pain Management;  Laterality: Bilateral;    UPPER GASTROINTESTINAL ENDOSCOPY       Social History     Socioeconomic History    Marital status:      Spouse name: Not on file    Number of children: Not on file    Years of education: Not on file    Highest education level: Not on file   Occupational History    Not on file   Social Needs    Financial resource strain: Very hard    Food insecurity     Worry: Often true     Inability: Often true    Transportation needs     Medical: No     Non-medical:  No   Tobacco Use    Smoking status: Current Every Day Smoker     Packs/day: 1.00     Years: 27.00     Pack years: 27.00     Types: Cigarettes     Start date: 1/1/1992    Smokeless tobacco: Never Used    Tobacco comment: 1/2 a pack if her days are good   Substance and Sexual Activity    Alcohol use: Yes     Alcohol/week: 0.0 standard drinks     Frequency: Never     Comment: socially    Drug use: No    Sexual activity: Yes     Partners: Male     Birth control/protection: None   Lifestyle    Physical activity     Days per week: Not on file     Minutes per session: Not on file    Stress: Not on file   Relationships    Social connections     Talks on phone: More than three times a week     Gets together: Three times a week     Attends Buddhism service: More than 4 times per year     Active member of club or organization: Yes     Attends meetings of clubs or organizations: More than 4 times per year     Relationship status:    Other Topics Concern    Not on file   Social History Narrative    Not on file     Family History   Problem Relation Age of Onset    Hypertension Mother     Diabetes Mother     Colon cancer Mother 50    Colon polyps Mother     Heart disease Father     COPD Father     Heart attack Father     Hypertension Father     Ulcers Father     Cancer Maternal Grandmother     Breast cancer Maternal Grandmother     Colon cancer Maternal Grandmother     Colon polyps Maternal Grandmother     No Known Problems Paternal Grandmother     No Known Problems Brother     No Known Problems Maternal Aunt     No Known Problems Maternal Uncle     No Known Problems Paternal Aunt     No Known Problems Paternal Uncle     No Known Problems Maternal Grandfather     No Known Problems Paternal Grandfather     Celiac disease Neg Hx     Cirrhosis Neg Hx     Crohn's disease Neg Hx     Cystic fibrosis Neg Hx     Esophageal cancer Neg Hx     Hemochromatosis Neg Hx     Inflammatory bowel  disease Neg Hx     Irritable bowel syndrome Neg Hx     Liver cancer Neg Hx     Liver disease Neg Hx     Rectal cancer Neg Hx     Stomach cancer Neg Hx     Ulcerative colitis Neg Hx     Jonnie's disease Neg Hx     Tuberculosis Neg Hx     Lymphoma Neg Hx     Scleroderma Neg Hx     Rheum arthritis Neg Hx     Melanoma Neg Hx     Multiple sclerosis Neg Hx     Psoriasis Neg Hx     Lupus Neg Hx     Skin cancer Neg Hx     Amblyopia Neg Hx     Blindness Neg Hx     Cataracts Neg Hx     Glaucoma Neg Hx     Macular degeneration Neg Hx     Retinal detachment Neg Hx     Strabismus Neg Hx     Stroke Neg Hx     Thyroid disease Neg Hx        Review of patient's allergies indicates:   Allergen Reactions    Gabapentin      Makes her twitch       Current Outpatient Medications   Medication Sig    acetaZOLAMIDE (DIAMOX) 500 mg CpSR Take 1 capsule (500 mg total) by mouth 2 (two) times daily.    albuterol (PROVENTIL/VENTOLIN HFA) 90 mcg/actuation inhaler Inhale 2 puffs into the lungs every 6 (six) hours as needed. Rescue    budesonide-formoterol 160-4.5 mcg (SYMBICORT) 160-4.5 mcg/actuation HFAA Inhale 2 puffs into the lungs every 12 (twelve) hours. Controller    desloratadine (CLARINEX) 5 mg tablet Take 1 tablet (5 mg total) by mouth once daily.    fluticasone propionate (FLONASE) 50 mcg/actuation nasal spray 1 spray (50 mcg total) by Each Nare route once daily.    lidocaine (LIDODERM) 5 % Place 1 patch onto the skin once daily. Remove & Discard patch within 12 hours or as directed by MD    losartan (COZAAR) 100 MG tablet TAKE 1 TABLET(100 MG) BY MOUTH EVERY DAY    ondansetron (ZOFRAN) 8 MG tablet Take 1 tablet (8 mg total) by mouth every 8 (eight) hours as needed for Nausea.    oxyCODONE-acetaminophen (PERCOCET)  mg per tablet Take 1 tablet by mouth every 12 (twelve) hours as needed for Pain.    pantoprazole (PROTONIX) 40 MG tablet Take 40 mg by mouth once daily.     QUEtiapine (SEROQUEL) 25 MG  Tab Take 1 tablet (25 mg total) by mouth once daily.    semaglutide (OZEMPIC) 1 mg/dose (2 mg/1.5 mL) PnIj Inject 1 mg subq weekly.    sumatriptan (IMITREX) 50 MG tablet Take 1 tab at onset of headaches.  If no improvement in 2 hours, take another.  Do not take more than 2 tabs in 24 hours.    topiramate (TOPAMAX) 100 MG tablet TAKE 3 TABLETS BY MOUTH TWICE DAILY    venlafaxine (EFFEXOR-XR) 75 MG 24 hr capsule TAKE 1 CAPSULE(75 MG) BY MOUTH EVERY EVENING    albuterol-ipratropium (DUO-NEB) 2.5 mg-0.5 mg/3 mL nebulizer solution Take 3 mLs by nebulization every 6 (six) hours as needed for Wheezing or Shortness of Breath. Rescue    atorvastatin (LIPITOR) 40 MG tablet Take 1 tablet (40 mg total) by mouth once daily. (Patient not taking: Reported on 7/14/2020)    blood sugar diagnostic Strp 1 each by Misc.(Non-Drug; Combo Route) route 4 (four) times daily before meals and nightly. ACCU CHEK ELVIA PLUS METER (Patient not taking: Reported on 7/14/2020)    blood-glucose meter (ACCU-CHEK ELVIA PLUS METER) Misc TEST FOUR TIMES DAILY BEFORE MEALS AND EVERY NIGHT (Patient not taking: Reported on 7/14/2020)    HYDROcodone-acetaminophen (NORCO) 5-325 mg per tablet Take 1 tablet by mouth every 8 (eight) hours as needed for Pain. (Patient not taking: Reported on 7/14/2020)    lactulose (CHRONULAC) 10 gram/15 mL solution TAKE 15 MLS BY MOUTH THREE TIMES DAILY AS NEEDED (Patient not taking: Reported on 6/9/2020)    lancets (ACCU-CHEK SOFTCLIX LANCETS) Misc 1 Device by Misc.(Non-Drug; Combo Route) route 4 (four) times daily. (Patient not taking: Reported on 7/14/2020)    metFORMIN (GLUCOPHAGE-XR) 500 MG XR 24hr tablet TAKE 2 TABLETS(1000 MG) BY MOUTH TWICE DAILY WITH MEALS (Patient not taking: Reported on 7/14/2020)    pregabalin (LYRICA) 50 MG capsule Take 1 capsule (50 mg total) by mouth 2 (two) times daily. (Patient not taking: Reported on 7/14/2020)    prucalopride 2 mg Tab Take 1 tablet by mouth once daily.     No  "current facility-administered medications for this visit.        REVIEW OF SYSTEMS:    GENERAL:  No weight loss, malaise or fevers.  HEENT:  + headaches  NECK:  Negative for lumps, goiter, pain and significant neck swelling.  RESPIRATORY:  Negative for cough, wheezing or shortness of breath. + SHARATH  CARDIOVASCULAR:  Negative for chest pain, leg swelling or palpitations.  GI:  Negative for abdominal discomfort, blood in stools or black stools or change in bowel habits.+ GERD  MUSCULOSKELETAL:  See HPI.  SKIN:  Negative for lesions, rash, and itching.  PSYCH:  + sleep disturbance and mood disorders  HEMATOLOGY/LYMPHOLOGY:  Negative for prolonged bleeding, bruising easily or swollen nodes.  NEURO:   No h/o syncope, paralysis. + h/o of seizures  All other reviewed and negative other than HPI.    OBJECTIVE:    BP (!) 156/87   Pulse 73   Temp 97.5 °F (36.4 °C) (Oral)   Resp 18   Ht 5' 4" (1.626 m)   Wt (!) 150 kg (330 lb 11 oz)   BMI 56.76 kg/m²     PHYSICAL EXAMINATION:    General appearance: Well appearing, in no acute distress, alert and oriented x3.  Psych:  Mood and affect appropriate.  Skin: Skin color, texture, turgor normal, no rashes or lesions, in both upper and lower body.  Head/face:  Normocephalic, atraumatic. No palpable lymph nodes.  GI:  Soft and non-tender.  Back: Diffusely ttp over facet joint and paraspinal muscles.  Limited ROM with pain on flexion and extension.  Positive facet loading on the right.  SLR is positive at right L5 distribution.  Extremities: Peripheral joint ROM is full and pain free without obvious instability or laxity in all four extremities. No deformities, edema, or skin discoloration. Good capillary refill.  Musculoskeletal:   No atrophy or tone abnormalities are noted.  Neuro: Decreased sensation to BLE.  Gait: Antalgic.    ASSESSMENT: 49 y.o. year old female with low back and leg pain, consistent with lumbar radiculopathy     1. Lumbar radiculopathy     2. Spinal stenosis of " lumbar region, unspecified whether neurogenic claudication present     3. Decreased activities of daily living (ADL)           PLAN:     - I have stressed the importance of physical activity and a home exercise plan to help with pain and improve health.  - L4/L5 bilateral TFESI pdid not provide any benefit of pain.  Can consider IL NAKITA which she declined.  - She stopped Lyrica due to dizziness.  - Continue with PT.  She needs to call to schedule.  - RTC in 6 weeks or sooner if needed.  - Counseled patient regarding the importance of smoking cessation and relationship with pain.    The above plan and management options were discussed at length with patient. Patient is in agreement with the above and verbalized understanding.   Laverne Taylor  07/14/2020

## 2020-07-14 NOTE — PATIENT INSTRUCTIONS
Possible Causes of Low Back or Leg Pain    The symptoms in your back or leg may be due to pressure on a nerve. This pressure may be caused by a damaged disk or by abnormal bone growth. Either way, you may feel pain, burning, tingling, or numbness. If you have pressure on a nerve that connects to the sciatic nerve, pain may shoot down your leg.    Pressure from the disk  Constant wear and tear can weaken a disk over time and cause back pain. The disk can then be damaged by a sudden movement or injury. If its soft center begins to bulge, the disk may press on a nerve. Or the outside of the disk may tear, and the soft center may squeeze through and pinch a nerve.    Pressure from bone  As a disk wears out, the vertebrae right above and below the disk begin to touch. This can put pressure on a nerve. Often, abnormal bone (called bone spurs) grows where the vertebrae rub against each other. This can cause the foramen or the spinal canal to narrow (called stenosis) and press against a nerve.  Date Last Reviewed: 10/4/2015  © 4139-6483 [employer]. [address] [place] [address]. All rights reserved. This information is not intended as a substitute for professional medical care. Always follow your healthcare professional's instructions.        Sciatica    Sciatica is a condition that causes pain in the lower back that spreads down into the buttock, hip, and leg. Sometimes the leg pain can happen without any back pain. Sciatica happens when a spinal nerve is irritated or has pressure put on it as comes out of the spinal canal in the lower back. This most often happens when a bulge or rupture of a nearby spinal disk presses on the nerve. Sciatica can also be caused by a narrowing of the spinal canal (spinal stenosis) or spasm of the muscle in the buttocks that the sciatic nerve passes through (pyriform muscle). Sciatica is also called lumbar radiculopathy.  Sciatica may begin after a sudden  twisting or bending force, such as in a car accident. Or it can happen after a simple awkward movement. In either case, muscle spasm often also happens. Muscle spasm makes the pain worse.  A healthcare provider makes a diagnosis of sciatica from your symptoms and a physical exam. Unless you had an injury from a car accident or fall, you usually wont have X-rays taken at this time. This is because the nerves and disks in your back cant be seen on an X-ray. If the provider sees signs of a compressed nerve, you will need to schedule an MRI scan as an outpatient. Signs of a compressed nerve include loss of strength in a leg.  Most sciatica gets better with medicine, exercise, and physical therapy. If your symptoms continue after at least 3 months of medical treatment, you may need surgery or injections to your lower back.  Home care  Follow these tips when caring for yourself at home:  · You may need to stay in bed the first few days. But as soon as possible, begin sitting up or walking. This will help you avoid problems that come from staying in bed for long periods.  · When in bed, try to find a position that is comfortable. A firm mattress is best. Try lying flat on your back with pillows under your knees. You can also try lying on your side with your knees bent up toward your chest and a pillow between your knees.  · Avoid sitting for long periods. This puts more stress on your lower back than standing or walking.  · Use heat from a hot shower, hot bath, or heating pad to help ease pain. Massage can also help. You can also try using an ice pack. You can make your own ice pack by putting ice cubes in a plastic bag. Wrap the bag in a thin towel. Try both heat and cold to see which works best. Use the method that feels best for 20 minutes several times a day.  · You may use acetaminophen or ibuprofen to ease pain, unless another pain medicine was prescribed. Note: If you have chronic liver or kidney disease, talk  with your healthcare provider before taking these medicines. Also talk with your provider if youve had a stomach ulcer or gastrointestinal bleeding.  · Use safe lifting methods. Dont lift anything heavier than 15 pounds until all of the pain is gone.  Follow-up care  Follow up with your healthcare provider, or as advised. You may need physical therapy or additional tests.  If X-rays were taken, a radiologist will look at them. You will be told of any new findings that may affect your care.  When to seek medical advice  Call your healthcare provider right away if any of these occur:  · Pain gets worse even after taking prescribed medicine  · Weakness or numbness in 1 or both legs or hips  · Numbness in your groin or genital area  · You cant control your bowel or bladder  · Fever  · Redness or swelling over your back or spine   Date Last Reviewed: 8/1/2016  © 9400-5858 [employer]. [address] [place] [address]. All rights reserved. This information is not intended as a substitute for professional medical care. Always follow your healthcare professional's instructions.        Understanding Lumbar Radiculopathy    Lumbar radiculopathy is irritation or inflammation of a nerve root in the low back. It causes symptoms that spread out from the back down one or both legs. To understand this condition, it helps to understand the parts of the spine:  · Vertebrae. These are bones that stack to form the spine. The lumbar spine contains the 5 bottom vertebrae.  · Disks. These are soft pads of tissue between the vertebrae. They act as shock absorbers for the spine.  · Spinal canal. This is a tunnel formed within the stacked vertebrae. In the lumbar spine, nerves run through this canal.  · Nerves. These branch off and leave the spinal canal, traveling out to parts of the body. As they leave the spinal canal, nerves pass through openings between the vertebrae. The nerve root is the part of the nerve  that is closest to the spinal canal.  · Sciatic nerve. This is a large nerve formed from several nerve roots in the low back. This nerve extends down the back of the leg to the foot.  With lumbar radiculopathy, nerve roots in the low back become irritated. This leads to pain and symptoms. The sciatic nerve is commonly involved, so the condition is often called sciatica.  What causes lumbar radiculopathy?  Aging, injury, poor posture, extra body weight, and other issues can lead to problems in the low back. These problems may then irritate nerve roots. They include:  · Damage to a disk in the lumbar spine. The damaged disk may then press on nearby nerve roots.  · Degeneration from wear and tear, and aging. This can lead to narrowing (stenosis) of the openings between the vertebrae. The narrowed openings press on nerve roots as they leave the spinal canal.  · Unstable spine. This is when a vertebra slips forward. It can then press on a nerve root.  Other, less common things can put pressure on nerves in the low back. These include diabetes, infection, or a tumor.  Symptoms of lumbar radiculopathy  These include:  · Pain in the low back  · Pain, numbness, tingling, or weakness that travels into the buttocks, hip, groin, or leg  · Muscle spasms  Treatment for lumbar radiculopathy  In most cases, your healthcare provider will first try treatments that help relieve symptoms. These may include:  · Prescription and over-the-counter pain medicines. These help relieve pain, swelling, and irritation.  · Limits on positions and activities that increase pain. But lying in bed or avoiding all movement is only recommended for a short period of time.  · Physical therapy, including exercises and stretches. This helps decrease pain and increase movement and function.  · Steroid shots into the lower back. This may help relieve symptoms for a time.  · Weight-loss program. If you are overweight, losing extra pounds may help relieve  symptoms.  In some cases, you may need surgery to fix the underlying problem. This depends on the cause, the symptoms, and how long the pain has lasted.  Possible complications  Over time, an irritated and inflamed nerve may become damaged. This may lead to long-lasting (permanent) numbness or weakness in your legs and feet. If symptoms change suddenly or get worse, be sure to let your healthcare provider know.  When to call your healthcare provider  Call your healthcare provider right away if you have any of these:  · New pain or pain that gets worse  · New or increasing weakness, tingling, or numbness in your leg or foot  · Problems controlling your bladder or bowel   Date Last Reviewed: 3/10/2016  © 1031-1853 [employer]. [address] [place] [address], [place] [address]. All rights reserved. This information is not intended as a substitute for professional medical care. Always follow your healthcare professional's instructions.

## 2020-07-15 ENCOUNTER — OFFICE VISIT (OUTPATIENT)
Dept: NEUROLOGY | Facility: CLINIC | Age: 50
End: 2020-07-15
Payer: MEDICARE

## 2020-07-15 ENCOUNTER — TELEPHONE (OUTPATIENT)
Dept: PHARMACY | Facility: CLINIC | Age: 50
End: 2020-07-15

## 2020-07-15 VITALS
HEART RATE: 73 BPM | BODY MASS INDEX: 50.02 KG/M2 | WEIGHT: 293 LBS | DIASTOLIC BLOOD PRESSURE: 88 MMHG | SYSTOLIC BLOOD PRESSURE: 153 MMHG | HEIGHT: 64 IN

## 2020-07-15 DIAGNOSIS — G47.33 OBSTRUCTIVE SLEEP APNEA: ICD-10-CM

## 2020-07-15 DIAGNOSIS — G89.29 OTHER CHRONIC PAIN: ICD-10-CM

## 2020-07-15 DIAGNOSIS — G43.809 OTHER MIGRAINE WITHOUT STATUS MIGRAINOSUS, NOT INTRACTABLE: ICD-10-CM

## 2020-07-15 DIAGNOSIS — G93.2 IDIOPATHIC INTRACRANIAL HYPERTENSION: Chronic | ICD-10-CM

## 2020-07-15 DIAGNOSIS — G43.719 INTRACTABLE CHRONIC MIGRAINE WITHOUT AURA AND WITHOUT STATUS MIGRAINOSUS: Primary | ICD-10-CM

## 2020-07-15 PROCEDURE — 3077F SYST BP >= 140 MM HG: CPT | Mod: HCNC,CPTII,S$GLB, | Performed by: NURSE PRACTITIONER

## 2020-07-15 PROCEDURE — 3079F PR MOST RECENT DIASTOLIC BLOOD PRESSURE 80-89 MM HG: ICD-10-PCS | Mod: HCNC,CPTII,S$GLB, | Performed by: NURSE PRACTITIONER

## 2020-07-15 PROCEDURE — 99214 OFFICE O/P EST MOD 30 MIN: CPT | Mod: HCNC,S$GLB,, | Performed by: NURSE PRACTITIONER

## 2020-07-15 PROCEDURE — 3008F PR BODY MASS INDEX (BMI) DOCUMENTED: ICD-10-PCS | Mod: HCNC,CPTII,S$GLB, | Performed by: NURSE PRACTITIONER

## 2020-07-15 PROCEDURE — 99999 PR PBB SHADOW E&M-EST. PATIENT-LVL IV: ICD-10-PCS | Mod: PBBFAC,HCNC,, | Performed by: NURSE PRACTITIONER

## 2020-07-15 PROCEDURE — 99999 PR PBB SHADOW E&M-EST. PATIENT-LVL IV: CPT | Mod: PBBFAC,HCNC,, | Performed by: NURSE PRACTITIONER

## 2020-07-15 PROCEDURE — 3008F BODY MASS INDEX DOCD: CPT | Mod: HCNC,CPTII,S$GLB, | Performed by: NURSE PRACTITIONER

## 2020-07-15 PROCEDURE — 99214 PR OFFICE/OUTPT VISIT, EST, LEVL IV, 30-39 MIN: ICD-10-PCS | Mod: HCNC,S$GLB,, | Performed by: NURSE PRACTITIONER

## 2020-07-15 PROCEDURE — 3077F PR MOST RECENT SYSTOLIC BLOOD PRESSURE >= 140 MM HG: ICD-10-PCS | Mod: HCNC,CPTII,S$GLB, | Performed by: NURSE PRACTITIONER

## 2020-07-15 PROCEDURE — 3079F DIAST BP 80-89 MM HG: CPT | Mod: HCNC,CPTII,S$GLB, | Performed by: NURSE PRACTITIONER

## 2020-07-15 RX ORDER — ERENUMAB-AOOE 140 MG/ML
140 INJECTION, SOLUTION SUBCUTANEOUS
Qty: 1 ML | Refills: 11 | Status: SHIPPED | OUTPATIENT
Start: 2020-07-15 | End: 2020-10-05 | Stop reason: CLARIF

## 2020-07-15 RX ORDER — DULOXETIN HYDROCHLORIDE 30 MG/1
30 CAPSULE, DELAYED RELEASE ORAL DAILY
Qty: 30 CAPSULE | Refills: 3 | Status: SHIPPED | OUTPATIENT
Start: 2020-07-15 | End: 2020-11-06 | Stop reason: SDUPTHER

## 2020-07-15 NOTE — LETTER
July 15, 2020      Elysia Contreras, DO  [address] [facility]  [place] [address]           [facility] [address]  [facility] [address]  Phone: [phone]  Fax: [phone]          Patient: Yanni Kaiser   MR Number: [id number]   YOB: 1970   Date of Visit: 7/15/2020       Dear Dr. Elysia Contreras:    Thank you for referring Yanni Kaiser to me for evaluation. Attached you will find relevant portions of my assessment and plan of care.    If you have questions, please do not hesitate to call me. I look forward to following Yanni Kaiser along with you.    Sincerely,    Hanh Palmer, NP    Enclosure  CC:  No Recipients    If you would like to receive this communication electronically, please contact [email or web address] or [phone] to request more information on [employer] Link access.    For providers and/or their staff who would like to refer a patient to [facility], please contact us through our one-stop-shop provider referral line, [facility], at [phone].    If you feel you have received this communication in error or would no longer like to receive these types of communications, please e-mail [email or web address]

## 2020-07-15 NOTE — PROGRESS NOTES
"REFERRING PROVIDER: Self referred    REASON FOR CONSULT: Headaches     Last seen by neuro 2018, initial visit with me. She has untreated mild-moderate SHARATH. Recent HST revealing AHI 6(RDI 16). The past 30days having daily headaches, always left sided, retroorbital with blurry vision again. Recent eye exam normal except mild lens RX change. Body aches all over. Continues to take topamax 300mg bid and diamox. Weraing mask during prior sleep study 2015 caused asthma attack. Imitrex 50mg helps abort pain but she always rests afterward. Never taken daytime. Taking 2 ES tylenol qd + rest to help pain. Prior headaches occurring 1-2 x/week.       HIT-6 score= 66    ROS: Denies double vision. +uncomfortable feeling legs and hands, +body aches. Otherwise a balance review of 10-systems is negative.       PHYSICAL EXAM:   General: W/D, morbid obese, well groomed  BP (!) 153/88 (BP Location: Right arm, Patient Position: Sitting, BP Method: Medium (Automatic))   Pulse 73   Ht 5' 4" (1.626 m)   Wt (!) 150 kg (330 lb 11 oz)   BMI 56.76 kg/m²   Neurological: Awake, alert, oriented x 3. Speech fluent, no dysarthria. Good attention span. Good fund of knowledge.   Pupils equal, left eyelid ptosis. No facial asymmetry. Good hearing bilaterally. Good shoulder shrug  Gait: Normal       IMPRESSION:   Chronic migraines w/o aura  Idiopathhic intracranial hypertension on topamax and diamox  Hx seizures  SHARAHT, mild-moderate. Ready to begin PAP  HTN, morbid obesity, DM    PLAN   aimovig 140mg SC q28d, discussed purpose and s/e  Stop effexor and begin Cymbalta 30mg, f/u with pain mgt and PCP  See Dr. Moore ongoing mgt/sschedule f/u appt  Stop daily Tylnol use  Imitrex 50-100mg 1/2-1 tab PO q2h prn, max 200mg/24h   BEGIN APAP 5-14cm .[facility] DME  Encouraged to eat regular meals, get adequate sleep, avoid known triggers, and reduce stressors    RTC 2.5 month, sooner if needed. Instructed how to keep HA diaries accurate monitoring    "

## 2020-07-16 ENCOUNTER — TELEPHONE (OUTPATIENT)
Dept: SPINE | Facility: CLINIC | Age: 50
End: 2020-07-16

## 2020-07-16 NOTE — TELEPHONE ENCOUNTER
Left message on pt vm stating that she was referred to Dr. Negron for pain medication management but she's scheduled with Dr. Patel. I stated on patients vm that Dr. Patel does not prescribe pain medication. I also stated that she should keep her appt that's scheduled with Dr. Negron for pain medication management.

## 2020-07-16 NOTE — TELEPHONE ENCOUNTER
Reached patient for Aimovig initial fill. She will  the medication from [facility] today . Copay $3.90 at 004.Patient declined consult or link to injection video, stating the provider explained everything in office. She understands to reach out to [facility] if any questions arise. Discussed refill calls and supplies sent. Confirmed two patient identifiers - name and     Les Bone, PharmD  Clinical Pharmacist   [facility]   P: [phone]

## 2020-07-16 NOTE — TELEPHONE ENCOUNTER
Submitted PA for Aimovig via [facility] under Case ID [id number] on 07/16/2020.

## 2020-07-16 NOTE — TELEPHONE ENCOUNTER
Prior Authorization for Aimovig has been approved from 07/16/2020 - 10/14/2020  Patient co-pay: $3.90

## 2020-07-16 NOTE — TELEPHONE ENCOUNTER
----- Message from Migdalia Patel MD sent at 7/16/2020  4:22 PM CDT -----  She has an appointment with me on 7/22.  The referral is to see Dr. Negron for pain medication management, she is also seeing pain management.  Please call and let her know I do not do pain medication she is scheduled to see Dr. Negron 11/6. She can keep appointment if she wants but needs to be aware    Thanks  Migdalia Patel

## 2020-07-20 ENCOUNTER — PATIENT OUTREACH (OUTPATIENT)
Dept: ADMINISTRATIVE | Facility: OTHER | Age: 50
End: 2020-07-20

## 2020-07-20 DIAGNOSIS — Z12.31 BREAST CANCER SCREENING BY MAMMOGRAM: Primary | ICD-10-CM

## 2020-07-21 NOTE — PROGRESS NOTES
Chart reviewed.   Immunizations: Triggered Imm Registry     Orders placed: Mammo w/Cad  Upcoming appts to satisfy KATE topics: n/a  Task appt sent

## 2020-07-22 ENCOUNTER — OFFICE VISIT (OUTPATIENT)
Dept: SPINE | Facility: CLINIC | Age: 50
End: 2020-07-22
Attending: PHYSICAL MEDICINE & REHABILITATION
Payer: MEDICARE

## 2020-07-22 VITALS
WEIGHT: 293 LBS | HEIGHT: 64 IN | SYSTOLIC BLOOD PRESSURE: 134 MMHG | HEART RATE: 81 BPM | BODY MASS INDEX: 50.02 KG/M2 | DIASTOLIC BLOOD PRESSURE: 74 MMHG

## 2020-07-22 DIAGNOSIS — M54.41 CHRONIC MIDLINE LOW BACK PAIN WITH BILATERAL SCIATICA: ICD-10-CM

## 2020-07-22 DIAGNOSIS — M48.061 SPINAL STENOSIS OF LUMBAR REGION WITHOUT NEUROGENIC CLAUDICATION: Primary | ICD-10-CM

## 2020-07-22 DIAGNOSIS — M79.7 FIBROMYALGIA: ICD-10-CM

## 2020-07-22 DIAGNOSIS — G89.29 CHRONIC MIDLINE LOW BACK PAIN WITH BILATERAL SCIATICA: ICD-10-CM

## 2020-07-22 DIAGNOSIS — M54.42 CHRONIC MIDLINE LOW BACK PAIN WITH BILATERAL SCIATICA: ICD-10-CM

## 2020-07-22 PROCEDURE — 3078F DIAST BP <80 MM HG: CPT | Mod: HCNC,CPTII,S$GLB, | Performed by: PHYSICAL MEDICINE & REHABILITATION

## 2020-07-22 PROCEDURE — 3008F PR BODY MASS INDEX (BMI) DOCUMENTED: ICD-10-PCS | Mod: HCNC,CPTII,S$GLB, | Performed by: PHYSICAL MEDICINE & REHABILITATION

## 2020-07-22 PROCEDURE — 3075F SYST BP GE 130 - 139MM HG: CPT | Mod: HCNC,CPTII,S$GLB, | Performed by: PHYSICAL MEDICINE & REHABILITATION

## 2020-07-22 PROCEDURE — 99999 PR PBB SHADOW E&M-EST. PATIENT-LVL V: ICD-10-PCS | Mod: PBBFAC,HCNC,, | Performed by: PHYSICAL MEDICINE & REHABILITATION

## 2020-07-22 PROCEDURE — 99204 PR OFFICE/OUTPT VISIT, NEW, LEVL IV, 45-59 MIN: ICD-10-PCS | Mod: HCNC,S$GLB,, | Performed by: PHYSICAL MEDICINE & REHABILITATION

## 2020-07-22 PROCEDURE — 3075F PR MOST RECENT SYSTOLIC BLOOD PRESS GE 130-139MM HG: ICD-10-PCS | Mod: HCNC,CPTII,S$GLB, | Performed by: PHYSICAL MEDICINE & REHABILITATION

## 2020-07-22 PROCEDURE — 99999 PR PBB SHADOW E&M-EST. PATIENT-LVL V: CPT | Mod: PBBFAC,HCNC,, | Performed by: PHYSICAL MEDICINE & REHABILITATION

## 2020-07-22 PROCEDURE — 99204 OFFICE O/P NEW MOD 45 MIN: CPT | Mod: HCNC,S$GLB,, | Performed by: PHYSICAL MEDICINE & REHABILITATION

## 2020-07-22 PROCEDURE — 3078F PR MOST RECENT DIASTOLIC BLOOD PRESSURE < 80 MM HG: ICD-10-PCS | Mod: HCNC,CPTII,S$GLB, | Performed by: PHYSICAL MEDICINE & REHABILITATION

## 2020-07-22 PROCEDURE — 3008F BODY MASS INDEX DOCD: CPT | Mod: HCNC,CPTII,S$GLB, | Performed by: PHYSICAL MEDICINE & REHABILITATION

## 2020-07-22 NOTE — LETTER
July 22, 2020      Elyisa Contreras, DO  [address] [facility]  [place] [address]           [facility] [address], [address]  [facility] [address]  Phone: [phone]  Fax: [phone]          Patient: Yanni Kaiser   MR Number: [id number]   YOB: 1970   Date of Visit: 7/22/2020       Dear Dr. Elysia Contreras:    Thank you for referring Yanni Kaiser to me for evaluation. Attached you will find relevant portions of my assessment and plan of care.    If you have questions, please do not hesitate to call me. I look forward to following Yanni Kasier along with you.    Sincerely,    Migdalia Patel MD    Enclosure  CC:  No Recipients    If you would like to receive this communication electronically, please contact [email or web address] or [phone] to request more information on [employer] Link access.    For providers and/or their staff who would like to refer a patient to [facility], please contact us through our one-stop-shop provider referral line, [facility], at [phone].    If you feel you have received this communication in error or would no longer like to receive these types of communications, please e-mail [email or web address]

## 2020-07-22 NOTE — PROGRESS NOTES
Subjective:      Patient ID: Yanni Kaiser is a 49 y.o. female.    Chief Complaint: Low-back Pain    Ms Kaiser is a 50 yo female with fibromyalgia sent in consultation by Dr. Contreras for evaluation of back pain.  She has been seeing pain medicine and had injection and was sent to one visit of PT.  She has had the pain for a couple of years but feels like it is getting worse.  The pain is in the center of the lower back.  She will also have pain down the back of both legs left worse than right.  She has shooting pain up her spine as well.  The constant pain is in the back.  She has pain in the feet.  The left foot is worse than the right.  She was told it has to do with her back.  She did get a day worth of relief and she declined future injections.  The pain is with sitting too long, standing, too long, lying down too long, bending hurts.  She feels like everyday things make it hurt.  She will change position to ease the pain.  Bending and sitting are the worst.  The pain is all words.  The pain is achy and throbbing and tingling pain.  Pain is 9/10 now, worst 10+/10 sit too long or bending, best 7/10 sitting with legs elevated with a pillow behind the back and constantly adjusting.  She is taking cymbalta for the pain once a day.  She takes percocet as needed.  She cannot take nsaids due to her stomach.  She says she did not go to PT but she did not get scheduled.  She said no one told her to get scheduled    Pain Medications:     - Opioids: Percocet (Oxycodone/Acetaminophen)  - Adjuvant Medications: Advil,Motrin ( Ibuprofen)   Lidoderm patch, without benefit  Voltaren gel, without benefit  Gabapentin, side effects  Lamotrigine      report:  Reviewed and consistent with medication use as prescribed.     Pain Procedures:   6/4/20 Bilateral L4/5 TF NAKITA     Imaging:   Narrative      EXAMINATION:  MRI LUMBAR SPINE WITHOUT CONTRAST 5/21/20    CLINICAL HISTORY:  spinal stenosis; Lumbago with sciatica, right  side    TECHNIQUE:  Multiplanar, multisequence MR images were acquired from the thoracolumbar junction to the sacrum without contrast.    COMPARISON:  MRI lumbar spine 08/28/2018    FINDINGS:  Straightening of the normal lumbar lordosis.  Mild grade 1 retrolisthesis L5 on S1    Vertebral body heights are maintained.  Several T1 and T2 hyperintense lesions within the L2 and L3 vertebral body favored to represent osseous hemangiomas.  Otherwise normal marrow signal.  No fracture.    Multilevel partial disc desiccation throughout the lumbar spine.  Posterior annular fissuring at L2-L3.    Distal spinal cord is unremarkable.  Conus terminates at L1-L2.    Degenerative findings:    T12-L1: No disc herniation, spinal canal stenosis, or neural foraminal narrowing.    L1-L2: No disc herniation, spinal canal stenosis, or neural foraminal narrowing.    L2-L3: No disc herniation, spinal canal stenosis or neural foraminal narrowing.    L3-L4: Mild circumferential disc bulge without spinal canal stenosis or neural foraminal narrowing.    L4-L5: Circumferential disc bulge, facet arthropathy, and ligamentum flavum hypertrophy resulting in moderate spinal canal stenosis and moderate bilateral neural foraminal narrowing.    L5-S1: Circumferential disc bulge and facet arthropathy without spinal canal stenosis or neural foraminal narrowing.    Paraspinal muscles and soft tissues are unremarkable.     Impression        Multilevel lumbar spondylosis most prominent at L4-5 resulting in moderate spinal canal stenosis and moderate bilateral neural foraminal narrowing.  Findings have slightly progressed in comparison to prior study dated 08/28/2018.      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  10/16/2018: DUB  (dysfunctional uterine bleeding)  No date: GERD (gastroesophageal reflux disease)  No date: High cholesterol  No date: Hyperprolactinemia  No date: Hypertension  2020: Influenza A  No date: Obese body habitus  No date: Pseudotumor cerebri  No date: Renal manifestation of secondary diabetes mellitus  No date: Respiratory failure  No date: Seizures  2018: Simple endometrial hyperplasia  No date: Sleep apnea  No date: Smoker  No date: Tobacco dependence    Past Surgical History:  No date: BREAST CYST ASPIRATION  No date:  SECTION      Comment:  X 1  No date: CHOLECYSTECTOMY  No date: COLONOSCOPY  2019: COLONOSCOPY; N/A      Comment:  Procedure: COLONOSCOPY;  Surgeon: Jagruti Sweeney MD;                 Location: [facility] (2ND FLR);  Service: Endoscopy;                 Laterality: N/A;  BMI is 63/gastroparesis/3 days full                liquid diet 1 day clear liquid see telephone encounter by               Ciara Pinto [facility]  2019: ESOPHAGEAL MANOMETRY WITH MEASUREMENT OF IMPEDANCE; N/A      Comment:  Procedure: MANOMETRY-ESOPHAGEAL-WITH IMPEDANCE;                 Surgeon: Jagruti Sweeney MD;  Location: [facility] (2ND                FLR);  Service: Endoscopy;  Laterality: N/A;  Hold                Narcotics x 1 days Hold TCA x 1 daysPropofol only. No               fentanyl or benzodiazepine during sedation. If additional               sedation needed, discuss with Dr. Sweeney.10/29 - pt                confirmed appt  2019: ESOPHAGOGASTRODUODENOSCOPY; N/A      Comment:  Procedure: EGD (ESOPHAGOGASTRODUODENOSCOPY);  Surgeon:                Jagruti Sweeney MD;  [facility]                 Service: Endoscopy;  Laterality: N/A;  BMI is                63/gastroparesis/3 days full liquid diet 1 day clear                liquid see telephone encounter by Ciara Pinto [facility]  2019: ESOPHAGOGASTRODUODENOSCOPY; N/A      Comment:  Procedure: ESOPHAGOGASTRODUODENOSCOPY (EGD);   Surgeon:                Jagruti Sweeney MD;  Location: [facility] (2ND FLR);                 Service: Endoscopy;  Laterality: N/A;  EGD with EndoFlip                /+/- [id number] floor for gastroparesis/BMI 63                **367lbs**Bariatric Stretcher neededManometry probe                to be placed endoscopically during EGD procedureDue to                change in protocol, Full liquid diet for 3 days/ 1 day of               clear liquidsHi  No date: FOOT FRACTURE SURGERY; Left  10/16/2018: HYSTEROSCOPY WITH DILATION AND CURETTAGE OF UTERUS; N/A      Comment:  KJB  11/18/2019: PLANTAR FASCIOTOMY; Left      Comment:  Procedure: FASCIOTOMY, PLANTAR;  Surgeon: Valentino Orourke DPM;  Location: [facility] OR 2ND FLR;  Service:                Podiatry;  Laterality: Left;  No date: RETINAL LASER PROCEDURE; Left  No date: SINUS SURGERY  6/24/2020: TRANSFORAMINAL EPIDURAL INJECTION OF STEROID; Bilateral      Comment:  Procedure: INJECTION, STEROID, EPIDURAL, TRANSFORAMINAL                APPROACH;  Surgeon: Lawrence Marin MD;  Location: [facility]                PAIN MGT;  Service: Pain Management;  Laterality:                Bilateral;  No date: UPPER GASTROINTESTINAL ENDOSCOPY    Review of patient's family history indicates:  Problem: Hypertension      Relation: Mother          Age of Onset: (Not Specified)  Problem: Diabetes      Relation: Mother          Age of Onset: (Not Specified)  Problem: Colon cancer      Relation: Mother          Age of Onset: 50  Problem: Colon polyps      Relation: Mother          Age of Onset: (Not Specified)  Problem: Heart disease      Relation: Father          Age of Onset: (Not Specified)  Problem: COPD      Relation: Father          Age of Onset: (Not Specified)  Problem: Heart attack      Relation: Father          Age of Onset: (Not Specified)  Problem: Hypertension      Relation: Father          Age of Onset: (Not Specified)  Problem: Ulcers      Relation: Father          Age  of Onset: (Not Specified)  Problem: Cancer      Relation: Maternal Grandmother          Age of Onset: (Not Specified)  Problem: Breast cancer      Relation: Maternal Grandmother          Age of Onset: (Not Specified)  Problem: Colon cancer      Relation: Maternal Grandmother          Age of Onset: (Not Specified)  Problem: Colon polyps      Relation: Maternal Grandmother          Age of Onset: (Not Specified)  Problem: No Known Problems      Relation: Paternal Grandmother          Age of Onset: (Not Specified)  Problem: No Known Problems      Relation: Brother          Age of Onset: (Not Specified)  Problem: No Known Problems      Relation: Maternal Aunt          Age of Onset: (Not Specified)  Problem: No Known Problems      Relation: Maternal Uncle          Age of Onset: (Not Specified)  Problem: No Known Problems      Relation: Paternal Aunt          Age of Onset: (Not Specified)  Problem: No Known Problems      Relation: Paternal Uncle          Age of Onset: (Not Specified)  Problem: No Known Problems      Relation: Maternal Grandfather          Age of Onset: (Not Specified)  Problem: No Known Problems      Relation: Paternal Grandfather          Age of Onset: (Not Specified)  Problem: Celiac disease      Relation: Neg Hx          Age of Onset: (Not Specified)  Problem: Cirrhosis      Relation: Neg Hx          Age of Onset: (Not Specified)  Problem: Crohn's disease      Relation: Neg Hx          Age of Onset: (Not Specified)  Problem: Cystic fibrosis      Relation: Neg Hx          Age of Onset: (Not Specified)  Problem: Esophageal cancer      Relation: Neg Hx          Age of Onset: (Not Specified)  Problem: Hemochromatosis      Relation: Neg Hx          Age of Onset: (Not Specified)  Problem: Inflammatory bowel disease      Relation: Neg Hx          Age of Onset: (Not Specified)  Problem: Irritable bowel syndrome      Relation: Neg Hx          Age of Onset: (Not Specified)  Problem: Liver cancer      Relation:  Neg Hx          Age of Onset: (Not Specified)  Problem: Liver disease      Relation: Neg Hx          Age of Onset: (Not Specified)  Problem: Rectal cancer      Relation: Neg Hx          Age of Onset: (Not Specified)  Problem: Stomach cancer      Relation: Neg Hx          Age of Onset: (Not Specified)  Problem: Ulcerative colitis      Relation: Neg Hx          Age of Onset: (Not Specified)  Problem: Jonnie's disease      Relation: Neg Hx          Age of Onset: (Not Specified)  Problem: Tuberculosis      Relation: Neg Hx          Age of Onset: (Not Specified)  Problem: Lymphoma      Relation: Neg Hx          Age of Onset: (Not Specified)  Problem: Scleroderma      Relation: Neg Hx          Age of Onset: (Not Specified)  Problem: Rheum arthritis      Relation: Neg Hx          Age of Onset: (Not Specified)  Problem: Melanoma      Relation: Neg Hx          Age of Onset: (Not Specified)  Problem: Multiple sclerosis      Relation: Neg Hx          Age of Onset: (Not Specified)  Problem: Psoriasis      Relation: Neg Hx          Age of Onset: (Not Specified)  Problem: Lupus      Relation: Neg Hx          Age of Onset: (Not Specified)  Problem: Skin cancer      Relation: Neg Hx          Age of Onset: (Not Specified)  Problem: Amblyopia      Relation: Neg Hx          Age of Onset: (Not Specified)  Problem: Blindness      Relation: Neg Hx          Age of Onset: (Not Specified)  Problem: Cataracts      Relation: Neg Hx          Age of Onset: (Not Specified)  Problem: Glaucoma      Relation: Neg Hx          Age of Onset: (Not Specified)  Problem: Macular degeneration      Relation: Neg Hx          Age of Onset: (Not Specified)  Problem: Retinal detachment      Relation: Neg Hx          Age of Onset: (Not Specified)  Problem: Strabismus      Relation: Neg Hx          Age of Onset: (Not Specified)  Problem: Stroke      Relation: Neg Hx          Age of Onset: (Not Specified)  Problem: Thyroid disease      Relation: Neg Hx           Age of Onset: (Not Specified)      Social History    Socioeconomic History      Marital status:       Spouse name: Not on file      Number of children: Not on file      Years of education: Not on file      Highest education level: Not on file    Occupational History      Not on file    Social Needs      Financial resource strain: Very hard      Food insecurity        Worry: Often true        Inability: Often true      Transportation needs        Medical: No        Non-medical: No    Tobacco Use      Smoking status: Current Every Day Smoker        Packs/day: 1.00        Years: 27.00        Pack years: 27        Types: Cigarettes        Start date: 1/1/1992      Smokeless tobacco: Never Used      Tobacco comment: 1/2 a pack if her days are good    Substance and Sexual Activity      Alcohol use: Yes        Alcohol/week: 0.0 standard drinks        Frequency: Never        Comment: socially      Drug use: No      Sexual activity: Yes        Partners: Male        Birth control/protection: None    Lifestyle      Physical activity        Days per week: Not on file        Minutes per session: Not on file      Stress: Not on file    Relationships      Social connections        Talks on phone: More than three times a week        Gets together: Three times a week        Attends [employer] service: More than 4 times per year        Active member of club or organization: Yes        Attends meetings of clubs or organizations: More than 4 times per year        Relationship status:     Other Topics      Concerns:        Not on file    Social History Narrative      Not on file      Current Outpatient Medications:  acetaZOLAMIDE (DIAMOX) 500 mg CpSR, Take 1 capsule (500 mg total) by mouth 2 (two) times daily., Disp: 360 capsule, Rfl: 1  albuterol (PROVENTIL/VENTOLIN HFA) 90 mcg/actuation inhaler, Inhale 2 puffs into the lungs every 6 (six) hours as needed. Rescue, Disp: 54 g, Rfl: 0  albuterol-ipratropium (DUO-NEB) 2.5  mg-0.5 mg/3 mL nebulizer solution, Take 3 mLs by nebulization every 6 (six) hours as needed for Wheezing or Shortness of Breath. Rescue, Disp: 100 mL, Rfl: 3  atorvastatin (LIPITOR) 40 MG tablet, Take 1 tablet (40 mg total) by mouth once daily. (Patient not taking: Reported on 7/15/2020), Disp: 90 tablet, Rfl: 3  blood sugar diagnostic Strp, 1 each by Misc.(Non-Drug; Combo Route) route 4 (four) times daily before meals and nightly. ACCU CHEK ELVIA PLUS METER (Patient not taking: Reported on 7/14/2020), Disp: 200 each, Rfl: 3  blood-glucose meter (ACCU-CHEK ELVIA PLUS METER) [facility] [place], TEST FOUR TIMES DAILY BEFORE MEALS AND EVERY NIGHT (Patient not taking: Reported on 7/14/2020), Disp: 90 each, Rfl: 1  budesonide-formoterol 160-4.5 mcg (SYMBICORT) 160-4.5 mcg/actuation HFAA, Inhale 2 puffs into the lungs every 12 (twelve) hours. Controller, Disp: 1 Inhaler, Rfl: 5  desloratadine (CLARINEX) 5 mg tablet, Take 1 tablet (5 mg total) by mouth once daily., Disp: 30 tablet, Rfl: 11  DULoxetine (CYMBALTA) 30 MG capsule, Take 1 capsule (30 mg total) by mouth once daily., Disp: 30 capsule, Rfl: 3  erenumab-aooe (AIMOVIG AUTOINJECTOR) 140 mg/mL AtIn, Inject 140 mg into the skin every 28 days., Disp: 1 mL, Rfl: 11  fluticasone propionate (FLONASE) 50 mcg/actuation nasal spray, 1 spray (50 mcg total) by Each Nare route once daily., Disp: 15.8 mL, Rfl: 2  lancets (ACCU-CHEK SOFTCLIX LANCETS) Misc, 1 Device by Misc.(Non-Drug; Combo Route) route 4 (four) times daily. (Patient not taking: Reported on 7/14/2020), Disp: 200 each, Rfl: 3  lidocaine (LIDODERM) 5 %, Place 1 patch onto the skin once daily. Remove & Discard patch within 12 hours or as directed by MD, Disp: 15 patch, Rfl: 0  losartan (COZAAR) 100 MG tablet, TAKE 1 TABLET(100 MG) BY MOUTH EVERY DAY, Disp: 90 tablet, Rfl: 0  ondansetron (ZOFRAN) 8 MG tablet, Take 1 tablet (8 mg total) by mouth every 8 (eight) hours as needed for Nausea., Disp: 90 tablet, Rfl:  5  oxyCODONE-acetaminophen (PERCOCET)  mg per tablet, Take 1 tablet by mouth every 12 (twelve) hours as needed for Pain., Disp: 40 tablet, Rfl: 0  pantoprazole (PROTONIX) 40 MG tablet, Take 40 mg by mouth once daily. , Disp: , Rfl: 3  prucalopride 2 mg Tab, Take 1 tablet by mouth once daily., Disp: 90 tablet, Rfl: 3  QUEtiapine (SEROQUEL) 25 MG Tab, Take 1 tablet (25 mg total) by mouth once daily., Disp: 90 tablet, Rfl: 0  semaglutide (OZEMPIC) 1 mg/dose (2 mg/1.5 mL) PnIj, Inject 1 mg subq weekly., Disp: 9 mL, Rfl: 1  sumatriptan (IMITREX) 50 MG tablet, Take 1 tab at onset of headaches.  If no improvement in 2 hours, take another.  Do not take more than 2 tabs in 24 hours., Disp: 9 tablet, Rfl: 5  topiramate (TOPAMAX) 100 MG tablet, TAKE 3 TABLETS BY MOUTH TWICE DAILY, Disp: 180 tablet, Rfl: 11    No current facility-administered medications for this visit.       Review of patient's allergies indicates:   -- Gabapentin     --  Makes her twitch        Review of Systems   Constitution: Negative for weight gain and weight loss.   Cardiovascular: Negative for chest pain.   Respiratory: Negative for shortness of breath.    Musculoskeletal: Positive for back pain. Negative for joint pain and joint swelling.   Gastrointestinal: Positive for nausea. Negative for abdominal pain, bowel incontinence and vomiting.   Genitourinary: Negative for bladder incontinence.   Neurological: Positive for paresthesias (bilateral legs). Negative for numbness.         Objective:        General: Yanni is well-developed, well-nourished, appears stated age, in no acute distress, alert and oriented to time, place and person.     General    Vitals reviewed.  Constitutional: She is oriented to person, place, and time. She appears well-developed and well-nourished.   HENT:   Head: Normocephalic and atraumatic.   Pulmonary/Chest: Effort normal.   Neurological: She is alert and oriented to person, place, and time.   Psychiatric: She has a  normal mood and affect. Her behavior is normal. Judgment and thought content normal.     General Musculoskeletal Exam   Gait: normal         Right Hip Exam     Tenderness  Also right side trochanteric tenderness.  Left Hip Exam     Tenderness  Also left side trochanteric tenderness.      Back (L-Spine & T-Spine) / Neck (C-Spine) Exam     Tenderness   The patient is tender to palpation of the right trapezial, left trapezial, right side trochanteric and left side trochanteric. Right paramedian tenderness of the Upper C-Spine, Lower C-Spine, Upper T-Spine, Lower T-Spine, Upper L-Spine, Lower L-Spine and Sacrum. Left paramedian tenderness of the Sacrum, Lower L-Spine, Upper L-Spine, Lower T-Spine, Upper T-Spine, Lower C-Spine and Upper C-Spine.     Back (L-Spine & T-Spine) Range of Motion   Extension: 10 (with pain)   Flexion: 30 (with pain)   Lateral bend right: 10   Lateral bend left: 10   Rotation right: 30   Rotation left: 30     Spinal Sensation   Right Side Sensation  C-Spine Level: normal   L-Spine Level: normal  S-Spine Level: normal  Left Side Sensation  C-Spine Level: normal  L-Spine Level: normal  S-Spine Level: normal    Back (L-Spine & T-Spine) Tests   Right Side Tests  Straight leg raise:      Sitting SLR: > 70 degrees      Left Side Tests  Straight leg raise:     Sitting SLR: > 70 degrees          Other She has no scoliosis .  Spinal Kyphosis:  Absent    Suggestions for Possible Nonorganic Illness          Lower extremity breakaway weakness  Pain to light touch          Comments:  Pain with hip ROM      Muscle Strength   Right Upper Extremity   Biceps: 5/5/5   Deltoid:  5/5  Triceps:  5/5  Wrist extension: 5/5/5   Finger Flexors:  5/5  Left Upper Extremity  Biceps: 5/5/5   Deltoid:  5/5  Triceps:  5/5  Wrist extension: 5/5/5   Finger Flexors:  5/5  Right Lower Extremity   Hip Flexion: 5/5   Quadriceps:  5/5   Anterior tibial:  5/5/5  EHL:  5/5  Left Lower Extremity   Hip Flexion: 4/5   Quadriceps:  4/5    Anterior tibial:  4/5/5   EHL:  4/5    Reflexes     Left Side  Biceps:  2+  Triceps:  2+  Brachioradialis:  2+  Quadriceps:  2+  Achilles:  2+  Left Salazar's Sign:  Absent  Babinski Sign:  absent    Right Side   Biceps:  2+  Triceps:  2+  Brachioradialis:  2+  Quadriceps:  2+  Achilles:  2+  Right Salazar's Sign:  absent  Babinski Sign:  absent    Vascular Exam     Right Pulses        Carotid:                  2+    Left Pulses        Carotid:                  2+              Assessment:       1. Spinal stenosis of lumbar region without neurogenic claudication    2. Chronic midline low back pain with bilateral sciatica    3. Fibromyalgia           Plan:       Orders Placed This Encounter    Ambulatory referral/consult to Physical/Occupational Therapy     1. We discussed back pain and the nature of back pain.  We discussed that it will likely improve and that it is not one thing that causes the pain but an accumulation of multiple things that we do.  MRI of the lumbar spine reviewed with spinal stenosis at L4-5 but no relief with injections.  She was not interested in trying an IL.  We discussed her symptoms are not spinal stenosis symptoms, spinal stenosis most common MRI finding.  We discussed the fibromylagia  2. We discussed posture sitting and the importance of trying to sit better.   3. We discussed the benefits of therapy and exercise and continuing to move. She has fibromyalgia as well as back pain and that likely making the pain worse.  We discussed the importance of exercise and moving.  We discussed stretching and trying to get exercise.  We discussed pool, she does not like water.  We discussed the importance of movement.    4. PT for back and core strengthening, and extension exercises and Le strenghtening and HEP.  We did discuss healthy back as an option, but feel like it would be too hard at first.  Need to make sure to get visits scheduled before she leaves  5. Continue cymbalta  6. She will  follow with pain management  7. She is going to check in with Snow Collazo about participating in FRP and chronic pain options  8.  RTC 4 months, she is scheduled to see Dr. Negron.  She was told she can follow up or wait and see what he has to say.      More than 50% of the total time  of 45 minutes was spent face to face in counseling on diagnosis and treatment options. I also counseled patient  on common and most usual side effect of prescribed medications.  I reviewed Primary care , and other specialty's notes to better coordinate patient's care. All questions were answered, and patient voiced understanding.       A consultation note will be sent to Dr. Contreras through epic.  Thanks for consult      Follow-up: No follow-ups on file. If there are any questions prior to this, the patient was instructed to contact the office.

## 2020-07-29 ENCOUNTER — TELEPHONE (OUTPATIENT)
Dept: OBSTETRICS AND GYNECOLOGY | Facility: CLINIC | Age: 50
End: 2020-07-29

## 2020-07-29 NOTE — TELEPHONE ENCOUNTER
----- Message from Dipika Harden sent at 7/29/2020 12:56 PM CDT -----  Regarding: rt a missed call   Type:  Patient Returning Call    Who Called: VANI CAMPUZANO [[id number]]    Who Left Message for Patient:unknown     Does the patient know what this is regarding?yes    Best Call Back Number:[phone]    Additional Information:

## 2020-07-31 ENCOUNTER — PATIENT MESSAGE (OUTPATIENT)
Dept: PODIATRY | Facility: CLINIC | Age: 50
End: 2020-07-31

## 2020-07-31 RX ORDER — OXYCODONE AND ACETAMINOPHEN 10; 325 MG/1; MG/1
1 TABLET ORAL EVERY 12 HOURS PRN
Qty: 40 TABLET | Refills: 0 | OUTPATIENT
Start: 2020-07-31

## 2020-07-31 RX ORDER — HYDROCODONE BITARTRATE AND ACETAMINOPHEN 5; 325 MG/1; MG/1
1 TABLET ORAL EVERY 12 HOURS PRN
Qty: 40 TABLET | Refills: 0 | Status: SHIPPED | OUTPATIENT
Start: 2020-07-31 | End: 2020-08-19

## 2020-07-31 NOTE — TELEPHONE ENCOUNTER
I called patient she said she was not going get the pain pills because wants something stronger she will wait until Monday for the same pain pills she had last time

## 2020-08-01 ENCOUNTER — PATIENT OUTREACH (OUTPATIENT)
Dept: ADMINISTRATIVE | Facility: OTHER | Age: 50
End: 2020-08-01

## 2020-08-01 NOTE — PROGRESS NOTES
Care Everywhere: updated  Immunization:   Health Maintenance:   Media Review:   Legacy Review:   Order placed:   Upcoming appts:mammogram 8.5.2020

## 2020-08-03 ENCOUNTER — OFFICE VISIT (OUTPATIENT)
Dept: OBSTETRICS AND GYNECOLOGY | Facility: CLINIC | Age: 50
End: 2020-08-03
Payer: MEDICARE

## 2020-08-03 ENCOUNTER — HOSPITAL ENCOUNTER (OUTPATIENT)
Dept: RADIOLOGY | Facility: OTHER | Age: 50
Discharge: HOME | End: 2020-08-03
Attending: OBSTETRICS & GYNECOLOGY
Payer: MEDICARE

## 2020-08-03 VITALS
WEIGHT: 293 LBS | DIASTOLIC BLOOD PRESSURE: 82 MMHG | HEIGHT: 64 IN | SYSTOLIC BLOOD PRESSURE: 122 MMHG | BODY MASS INDEX: 50.02 KG/M2

## 2020-08-03 DIAGNOSIS — R10.2 PELVIC PAIN IN FEMALE: ICD-10-CM

## 2020-08-03 DIAGNOSIS — Z01.419 ENCOUNTER FOR GYNECOLOGICAL EXAMINATION WITHOUT ABNORMAL FINDING: Primary | ICD-10-CM

## 2020-08-03 PROCEDURE — G0101 PR CA SCREEN;PELVIC/BREAST EXAM: ICD-10-PCS | Mod: HCNC,S$GLB,, | Performed by: OBSTETRICS & GYNECOLOGY

## 2020-08-03 PROCEDURE — 76830 US PELVIS COMP WITH TRANSVAG NON-OB (XPD): ICD-10-PCS | Mod: 26,HCNC,, | Performed by: RADIOLOGY

## 2020-08-03 PROCEDURE — 99999 PR PBB SHADOW E&M-EST. PATIENT-LVL III: CPT | Mod: PBBFAC,HCNC,, | Performed by: OBSTETRICS & GYNECOLOGY

## 2020-08-03 PROCEDURE — 76830 TRANSVAGINAL US NON-OB: CPT | Mod: TC,HCNC

## 2020-08-03 PROCEDURE — 87086 URINE CULTURE/COLONY COUNT: CPT | Mod: HCNC

## 2020-08-03 PROCEDURE — 99999 PR PBB SHADOW E&M-EST. PATIENT-LVL III: ICD-10-PCS | Mod: PBBFAC,HCNC,, | Performed by: OBSTETRICS & GYNECOLOGY

## 2020-08-03 PROCEDURE — G0101 CA SCREEN;PELVIC/BREAST EXAM: HCPCS | Mod: HCNC,S$GLB,, | Performed by: OBSTETRICS & GYNECOLOGY

## 2020-08-03 PROCEDURE — 76856 US EXAM PELVIC COMPLETE: CPT | Mod: 26,HCNC,, | Performed by: RADIOLOGY

## 2020-08-03 PROCEDURE — 76856 US PELVIS COMP WITH TRANSVAG NON-OB (XPD): ICD-10-PCS | Mod: 26,HCNC,, | Performed by: RADIOLOGY

## 2020-08-03 PROCEDURE — 76830 TRANSVAGINAL US NON-OB: CPT | Mod: 26,HCNC,, | Performed by: RADIOLOGY

## 2020-08-03 PROCEDURE — 87491 CHLMYD TRACH DNA AMP PROBE: CPT | Mod: HCNC

## 2020-08-03 RX ORDER — IBUPROFEN 800 MG/1
800 TABLET ORAL EVERY 8 HOURS PRN
Qty: 30 TABLET | Refills: 5 | Status: SHIPPED | OUTPATIENT
Start: 2020-08-03 | End: 2020-08-25

## 2020-08-03 RX ORDER — OXYCODONE AND ACETAMINOPHEN 10; 325 MG/1; MG/1
1 TABLET ORAL EVERY 12 HOURS PRN
Qty: 40 TABLET | Refills: 0 | Status: SHIPPED | OUTPATIENT
Start: 2020-08-03 | End: 2020-08-28 | Stop reason: SDUPTHER

## 2020-08-03 NOTE — PROGRESS NOTES
08/03/20 U/S  Uterus:  Size: 9.5 cm x 6 cm x 6.3 cm  Masses: There is a single intramural uterine fibroid in the anterior body of the uterus measuring 1 cm x 9 mm x 9 mm.  The patient's uterus is retroverted.  Endometrium: Normal in this pre menopausal patient, measuring 2 mm.  Right ovary:  The right ovary is not visualized at this time however there are no adnexal abnormalities.  Left ovary:  The left ovary is not visualized at this time however there are no left adnexal abnormalities   Free Fluid:  None.  Impression:  1 cm uterine fibroid.  Otherwise normal exam.    PT HERE FOR ANNUAL.  NO MENSES SINCE LAST SEEING ME. +  SX. NOT ON  ANY HORMONES.  HAVING PELVIC CRAMPS FOR THE LAST MONTH. BEING TX'ED FOR IBS.      ROS:  GENERAL: No fever, chills, fatigability or weight loss.  VULVAR: No pain, no lesions and no itching.  VAGINAL: No relaxation, no itching, no discharge, no abnormal bleeding and no lesions.  ABDOMEN: No abdominal pain. Denies nausea. Denies vomiting. No diarrhea. No constipation  BREAST: Denies pain. No lumps. No discharge.  URINARY: No incontinence, no nocturia, no frequency and no dysuria.  CARDIOVASCULAR: No chest pain. No shortness of breath. No leg cramps.  NEUROLOGICAL: No headaches. No vision changes.  The remainder of the review of systems was negative.    PE:  General Appearance: obese And Well developed. Well nourished. In no acute distress.  Vulva: Lesions: No.  Urethral Meatus: Normal size. Normal location. No lesions. No prolapse.  Urethra: No masses. No tenderness. No prolapse. No scarring.  Bladder: No masses. No tenderness.  Vagina: Mucosa NI:yes discharge no, atrophy no, cystocele no or rectocele no.  Cervix: Lesion: no  Stenotic: no Cervical motion tenderness: no  Uterus: Uterus size: 7 weeks. Support good. Uterus size: Normal  Adnexa: Masses: No Tenderness: No CDS Nodularity: No  Abdomen: obese No masses. No tenderness.  Breasts: No bilateral masses. No bilateral discharge. No  bilateral tenderness. No bilateral fibrocystic changes.  Neck: No thyroid enlargement. No thyroid masses.  Skin: Rashes: No      PROCEDURES:    PLAN:     DIAGNOSIS:  1. Encounter for gynecological examination without abnormal finding    2. Body mass index 60.0-69.9, adult    3. Pelvic pain in female        MEDICATIONS & ORDERS:  Orders Placed This Encounter    Urine culture    US Pelvis Comp with Transvag NON-OB (xpd    ibuprofen (ADVIL,MOTRIN) 800 MG tablet       Patient was counseled today on the new ACS guidelines for cervical cytology screening as well as the current recommendations for breast cancer screening. She was counseled to follow up with her PCP for other routine health maintenance. Counseling session lasted approximately 10 minutes, and all her questions were answered.         FOLLOW-UP: With me AFTER U/S.

## 2020-08-04 LAB — BACTERIA UR CULT: NO GROWTH

## 2020-08-05 ENCOUNTER — HOSPITAL ENCOUNTER (OUTPATIENT)
Dept: RADIOLOGY | Facility: HOSPITAL | Age: 50
Discharge: HOME | End: 2020-08-05
Attending: FAMILY MEDICINE
Payer: MEDICARE

## 2020-08-05 DIAGNOSIS — Z12.31 BREAST CANCER SCREENING BY MAMMOGRAM: ICD-10-CM

## 2020-08-05 PROCEDURE — 77063 BREAST TOMOSYNTHESIS BI: CPT | Mod: 26,HCNC,, | Performed by: RADIOLOGY

## 2020-08-05 PROCEDURE — 77067 MAMMO DIGITAL SCREENING BILAT WITH TOMOSYNTHESIS_CAD: ICD-10-PCS | Mod: 26,HCNC,, | Performed by: RADIOLOGY

## 2020-08-05 PROCEDURE — 77067 SCR MAMMO BI INCL CAD: CPT | Mod: 26,HCNC,, | Performed by: RADIOLOGY

## 2020-08-05 PROCEDURE — 77067 SCR MAMMO BI INCL CAD: CPT | Mod: TC,HCNC,PO

## 2020-08-05 PROCEDURE — 77063 MAMMO DIGITAL SCREENING BILAT WITH TOMOSYNTHESIS_CAD: ICD-10-PCS | Mod: 26,HCNC,, | Performed by: RADIOLOGY

## 2020-08-06 ENCOUNTER — CLINICAL SUPPORT (OUTPATIENT)
Dept: REHABILITATION | Facility: HOSPITAL | Age: 50
End: 2020-08-06
Attending: PHYSICAL MEDICINE & REHABILITATION
Payer: MEDICARE

## 2020-08-06 DIAGNOSIS — G89.29 CHRONIC BILATERAL LOW BACK PAIN WITHOUT SCIATICA: Primary | ICD-10-CM

## 2020-08-06 DIAGNOSIS — M54.50 CHRONIC BILATERAL LOW BACK PAIN WITHOUT SCIATICA: Primary | ICD-10-CM

## 2020-08-06 DIAGNOSIS — M62.89 MUSCLE TIGHTNESS: ICD-10-CM

## 2020-08-06 DIAGNOSIS — R29.898 DECREASED STRENGTH OF TRUNK AND BACK: ICD-10-CM

## 2020-08-06 DIAGNOSIS — R29.898 DECREASED STRENGTH OF LOWER EXTREMITY: ICD-10-CM

## 2020-08-06 PROBLEM — M53.86 DECREASED RANGE OF MOTION OF LUMBAR SPINE: Status: RESOLVED | Noted: 2020-06-11 | Resolved: 2020-08-06

## 2020-08-06 PROBLEM — M54.42 CHRONIC BILATERAL LOW BACK PAIN WITH BILATERAL SCIATICA: Status: RESOLVED | Noted: 2020-06-11 | Resolved: 2020-08-06

## 2020-08-06 PROBLEM — M54.41 CHRONIC BILATERAL LOW BACK PAIN WITH BILATERAL SCIATICA: Status: RESOLVED | Noted: 2020-06-11 | Resolved: 2020-08-06

## 2020-08-06 LAB
C TRACH DNA SPEC QL NAA+PROBE: NOT DETECTED
N GONORRHOEA DNA SPEC QL NAA+PROBE: NOT DETECTED

## 2020-08-06 PROCEDURE — 97162 PT EVAL MOD COMPLEX 30 MIN: CPT | Mod: HCNC,PN

## 2020-08-06 NOTE — PLAN OF CARE
Physical Therapy Initial Evaluation     Name: Yanni Kaiser  Clinic Number: [id number]    Therapy Diagnosis:   Encounter Diagnoses   Name Primary?    Chronic bilateral low back pain without sciatica Yes    Decreased strength of trunk and back     Decreased strength of lower extremity     Muscle tightness      Physician: Migdalia Patel, *    Physician Orders: PT Eval and Treat   Medical Diagnosis from Referral: Chronic midline low back pain with bilateral sciatica  Evaluation Date: 2020  Authorization Period Expiration: 20  Plan of Care Expiration: 20  Visit # / Visits authorized:  pending    Time In: 3:00pm  Time Out: 3:45pm  Total Billable Time: 45 minutes    Precautions: Fibromyalgia, Chronic Bronchitis, T2DM with neurologic complications, Seizures    Subjective     Medical History:   Past Medical History:   Diagnosis Date    Allergy     Anemia     Asthma     Back pain     Chronic bronchitis     Cigarette smoker     DDD (degenerative disc disease), lumbar 2020    Depression     Diabetes with neurologic complications     DUB (dysfunctional uterine bleeding) 10/16/2018    GERD (gastroesophageal reflux disease)     High cholesterol     Hyperprolactinemia     Hypertension     Influenza A 2020    Obese body habitus     Pseudotumor cerebri     Renal manifestation of secondary diabetes mellitus     Respiratory failure     Seizures     Simple endometrial hyperplasia 2018    Sleep apnea     Smoker     Tobacco dependence        Surgical History:   Yanni Kaiser  has a past surgical history that includes Cholecystectomy; Sinus surgery;  section; Breast cyst aspiration; Hysteroscopy with dilation and curettage of uterus (N/A, 10/16/2018); Colonoscopy; Upper gastrointestinal endoscopy; Esophagogastroduodenoscopy (N/A, 2019); Colonoscopy (N/A, 2019); Retinal laser procedure (Left);  Esophagogastroduodenoscopy (N/A, 11/5/2019); Esophageal manometry with measurement of impedance (N/A, 11/5/2019); Plantar fasciotomy (Left, 11/18/2019); Foot fracture surgery (Left); and Transforaminal epidural injection of steroid (Bilateral, 6/24/2020).    Medications:   Yanni has a current medication list which includes the following prescription(s): acetazolamide, albuterol, albuterol-ipratropium, atorvastatin, blood sugar diagnostic, blood-glucose meter, budesonide-formoterol 160-4.5 mcg, desloratadine, duloxetine, aimovig autoinjector, fluticasone propionate, hydrocodone-acetaminophen, ibuprofen, lancets, lidocaine, losartan, ondansetron, oxycodone-acetaminophen, pantoprazole, prucalopride, quetiapine, ozempic, sumatriptan, and topiramate.    Allergies:   Review of patient's allergies indicates:   Allergen Reactions    Gabapentin      Makes her twitch        Date of onset: >5 years  History of current condition - Yanni reports: Chief complaint of B Low Back pain and L LE pain. Was coming to PT for same episode in June 2020, never completed episode. She had scheduling errors which caused her to stop attending.   Low back pain which goes into B LE (L LE > R LE). L low back into L gluteal and L foot pain. L LE is weaker than R LE. Did have plantar fasciitis on L LE with corrective surgery.She does have diagnosis of Fibromyalgia and has chronic pain.   Over 5 years pain. No surgeries to lumbar/BLEs beside L plantar fascia. Has numbness/tingling in B feet/hands which she relates to Diabetic Neuropathy. Last seizure was > 2 years ago. Endorses increased headaches.     Imaging  Multilevel lumbar spondylosis most prominent at L4-5 resulting in moderate spinal canal stenosis and moderate bilateral neural foraminal narrowing.  Findings have slightly progressed in comparison to prior study dated 08/28/2018.    Prior Therapy: Yes in June - didn't complete episode  Social History: lives single-story apartment, lives  with family  Occupation: Not working - disabled due to seizure  Prior Level of Function: indep  Bowel/Bladder Change:  DME owned/used: none  Current Level of Function: indep    Pain:  Current 8/10, worst 10/10, best 5/10   Location: B low back and L LE  Description: tight, shooting/stabbing, aching  Aggravating Factors: sitting unsupported,  Easing Factors: stretching - extending straight, gentle motion,    Pts goals: decrease pain, improve strength LE/back, return to walking for exercise    Objective     Posture Alignment: R side-bending in standing with R shoulder/pelvic drop and increased weightbearing through R LE; L3/4 hinge sign in prone, slouched posture  Palpation: easily irritable tenderness throughout B lumbar, gluteal, L posterior L LE    LUMBAR SPINE AROM:   Flexion: 15   Extension: 20   Left Sidebend: 25   Right Sidebend: 30   Left Rotation: 50% limited   Right Rotation: 50% limited     REPEATED MOTIONS - 5x  Flexion: Increased p!   Extension: Increased p!       SEGMENTAL MOBILITY: empty end-feel due to muscle guarding and pain L3-S1      LOWER EXTREMITY STRENGTH:   Left Right   Quadriceps 3+/5 4/5   Hamstrings 3+/5 5/5     Iliopsoas 3-/5 4/5   Glute Med 3+/5 3+/5   Hip Ext 3-/5 3-/5   Ankle DF 3+/5 5/5   Ankle PF 3+/5 5/5     Dermatomes: Sensation: Light Touch: Intact  Saddle Sensation: intact  Myotomes: grossly weak L LE > R LE - all muscle groups  Flexibility: Decreased HS, HF, Glutes    Special Tests:   Left Right   SLR Negative Negative   Crossed SLR Negative Negative   Sacral Thrust Positive Positive     GAIT: Yanni ambulates with no assistive device with independently.   GAIT DEVIATIONS: Yanni displays antalgic gait pattern with decreased stance phase on L LE, R pelvic drop +Trendelenburg, decreased stride length and gait speed    Pt/family was provided educational information, including: role of PT, goals for PT, scheduling - pt verbalized understanding. Discussed insurance limitations  "with pt.     Functional Limitations Reports - G Codes  Category: Mobility  Tool: FOTO Lumbar Survey  Score: 64% Limitation   Modifier  Impairment Limitation Restriction    CH  0 % impaired, limited or restricted    CI  @ least 1% but less than 20% impaired, limited or restricted    CJ  @ least 20%<40% impaired, limited or restricted    CK  @ least 40%<60% impaired, limited or restricted    CL  @ least 60% <80% impaired, limited or restricted    CM  @ least 80%<100% impaired limited or restricted    CN  100% impaired, limited or restricted     Current/: CL = 60-80% Limitation   Goal/ : CJ = 20-40% Limitation    TREATMENT     Treatment Time In: 3:35pm  Treatment Time Out: 3:45pm  Total Treatment time separate from Evaluation: 10 minutes    Yanni received therapeutic exercises to develop strength, endurance, ROM, flexibility, posture and core stabilization for 10 minutes including:    LTR 10x ea  SKTC c/ towel assist 2 x 15" hold ea LE  HS Str c/ strap 1x30" ea  Post Pelvic Tilt 10x 5" hold    Home Exercises and Patient Education Provided    Education provided:   Written Home Exercises Provided: yes.  Exercises were reviewed and Yanni was able to demonstrate them prior to the end of the session.  Yanni demonstrated good  understanding of the education provided.     See EMR under Patient Instructions for exercises provided 8/6/2020.    Assessment     Yanni is a 50 y.o. female referred to outpatient Physical Therapy with a medical diagnosis of Chronic midline low back pain with bilateral sciatica. with signs and symptoms including: increased low back pain, decreased lumbar ROM, decreased LE Strength, soft tissue dysfunction, postural imbalance,impaired joint mobility, and decreased tolerance to functional activities. Pain is poorly localized, with easily provoked pain/tenderness to light palpation throughout B lumbar paraspinal, B gluteal musculature/SIJ region, B piriformis, L HS and distally into " L calf. Significant weakness in L LE pain in hip, knee, and ankle musculature related to deconditioning and/or multi-level radicular pathology. Symptoms complicated due to diagnosis of Fibromyalgia and diabetic neuropathy with B UE/LE paresthesia. Pt with severe respiratory dysfunction and is at high risk to severe COVID-19 complications; PT and Pt agree to 1x/ frequency with high compliance to HEP in attempt to improve symptoms and functional activity, while maintaining decreased exposure risk.    Pt prognosis is Good.   Pt will benefit from skilled outpatient Physical Therapy to address the deficits stated above and in the chart below, provide pt/family education, and to maximize pt's level of independence.     Plan of care discussed with patient: Yes  Pt's spiritual, cultural and educational needs considered and patient is agreeable to the plan of care and goals as stated below:     Anticipated Barriers for therapy: COVID-19 Concern    Medical Necessity is demonstrated by the following  History  Co-morbidities and personal factors that may impact the plan of care Co-morbidities:   Fibromyalgia, Chronic Bronchitis, T2DM with neurologic complications, Seizures    Personal Factors:   BMI     high   Examination  Body Structures and Functions, activity limitations and participation restrictions that may impact the plan of care Body Regions:   back  lower extremities  trunk    Body Systems:    gross symmetry  ROM  strength  gross coordinated movement  transfers  transitions  motor control  motor learning    Participation Restrictions:   Bending, lifting, walking, squatting, reaching, carrying    Activity limitations:   Learning and applying knowledge  no deficits    General Tasks and Commands  no deficits    Communication  no deficits    Mobility  lifting and carrying objects  walking  driving (bike, car, motorcycle)    Self care  washing oneself (bathing, drying, washing hands)  caring for body parts (brushing teeth,  shaving, grooming)  dressing  looking after one's health    Domestic Life  shopping  cooking  doing house work (cleaning house, washing dishes, laundry)  assisting others    Interactions/Relationships  No deficits    Life Areas  No deficits    Community and Social Life  No deficits         moderate   Clinical Presentation evolving clinical presentation with changing clinical characteristics moderate   Decision Making/ Complexity Score: moderate     Pt's spiritual, cultural and educational needs considered and pt agreeable to plan of care and goals as stated below:     Short Term GOALS: 6 weeks. Pt agrees with goals set.  1. Patient demonstrates independence with HEP.   2. Patient demonstrates independence with Postural Awareness.   3. Patient demonstrates independence with body mechanics.   4. Patient will report pain of 7/10 at worst, on 0-10 pain scale, with all activity  5. Patient demonstrates increased thoracolumbar ROM by 5 degrees in all planes to improve tolerance to functional activities pain free.   6. Patient demonstrates increased strength BLE's by 1/3 muscle grade or greater to improve tolerance to functional activities pain free.    Long Term GOALS: 12 weeks. Pt agrees with goals set.  1. Patient demonstrates increased thoracolumbar flexion AROM by 15 degrees or greater to improve tolerance to functional activities pain free.   2. Patient demonstrates increased strength BLE's to 4/5 or greater to improve tolerance to functional activities pain free.   3. Patient demonstrates improved overall function per FOTO Lumbar Survey to 50% Limitation or less.   4. Patient will report pain of 5/10 at worst, on 0-10 pain scale, with all activity  5. Patient demonstrates ability to walk 2 blocks, appropriate gait pattern, no pain provocation      PLAN     Plan of care Certification: 8/6/2020 to 11/6/20.    Outpatient Physical Therapy 2 times weekly for 12 weeks to include the following interventions: Aquatic  Therapy, Cervical/Lumbar Traction, Electrical Stimulation IFC/NMES, Gait Training, Iontophoresis (with Dexamethasone), Manual Therapy, Moist Heat/ Ice, Neuromuscular Re-ed, Patient Education, Self Care, Therapeutic Activites, Therapeutic Exercise, Ultrasound and Dry Needling and Virtual Visits.  Pt may be seen by PTA as part of the rehabilitation team.     Ronni Dwyer, PT  8/6/2020    I have seen the patient, reviewed the therapist's plan of care, and I agree with the plan of care.      I certify the need for these services furnished under this plan of treatment and while under my care.     ___________________ ________ Physician/Referring Practitioner            ___________________________ Date of Signature

## 2020-08-06 NOTE — PROGRESS NOTES
"  Please See Full Physical Therapy Evaluation in Plan of Care                                                          Physical Therapy Initial Evaluation     Name: Yanni Kaiser  Clinic Number: [id number]    Therapy Diagnosis:   Encounter Diagnoses   Name Primary?    Chronic bilateral low back pain without sciatica Yes    Decreased strength of trunk and back     Decreased strength of lower extremity     Muscle tightness      Physician: Migdalia Patel, *    Physician Orders: PT Eval and Treat   Medical Diagnosis from Referral: Chronic midline low back pain with bilateral sciatica  Evaluation Date: 8/6/2020  Authorization Period Expiration: 9/6/20  Plan of Care Expiration: 11/6/20  Visit # / Visits authorized: 1/ pending    Time In: 3:00pm  Time Out: 3:45pm  Total Billable Time: 45 minutes    Precautions: Fibromyalgia, Chronic Bronchitis, T2DM with neurologic complications, Seizures      Functional Limitations Reports - G Codes  Category: Mobility  Tool: FOTO Lumbar Survey  Score: 64% Limitation   Modifier  Impairment Limitation Restriction    CH  0 % impaired, limited or restricted    CI  @ least 1% but less than 20% impaired, limited or restricted    CJ  @ least 20%<40% impaired, limited or restricted    CK  @ least 40%<60% impaired, limited or restricted    CL  @ least 60% <80% impaired, limited or restricted    CM  @ least 80%<100% impaired limited or restricted    CN  100% impaired, limited or restricted     Current/: CL = 60-80% Limitation   Goal/ : CJ = 20-40% Limitation    TREATMENT     Treatment Time In: 3:35pm  Treatment Time Out: 3:45pm  Total Treatment time separate from Evaluation: 10 minutes    Yanni received therapeutic exercises to develop strength, endurance, ROM, flexibility, posture and core stabilization for 10 minutes including:    LTR 10x ea  SKTC c/ towel assist 2 x 15" hold ea LE  HS Str c/ strap 1x30" ea  Post Pelvic Tilt 10x 5" hold    Home Exercises and Patient " Education Provided    Education provided:   Written Home Exercises Provided: yes.  Exercises were reviewed and Yanni was able to demonstrate them prior to the end of the session.  Lyndseye demonstrated good  understanding of the education provided.     See EMR under Patient Instructions for exercises provided 8/6/2020.    Assessment     Pt's spiritual, cultural and educational needs considered and pt agreeable to plan of care and goals as stated below:     Short Term GOALS: 6 weeks. Pt agrees with goals set.  1. Patient demonstrates independence with HEP.   2. Patient demonstrates independence with Postural Awareness.   3. Patient demonstrates independence with body mechanics.   4. Patient will report pain of 7/10 at worst, on 0-10 pain scale, with all activity  5. Patient demonstrates increased thoracolumbar ROM by 5 degrees in all planes to improve tolerance to functional activities pain free.   6. Patient demonstrates increased strength BLE's by 1/3 muscle grade or greater to improve tolerance to functional activities pain free.    Long Term GOALS: 12 weeks. Pt agrees with goals set.  1. Patient demonstrates increased thoracolumbar flexion AROM by 15 degrees or greater to improve tolerance to functional activities pain free.   2. Patient demonstrates increased strength BLE's to 4/5 or greater to improve tolerance to functional activities pain free.   3. Patient demonstrates improved overall function per FOTO Lumbar Survey to 50% Limitation or less.   4. Patient will report pain of 5/10 at worst, on 0-10 pain scale, with all activity  5. Patient demonstrates ability to walk 2 blocks, appropriate gait pattern, no pain provocation      PLAN     Plan of care Certification: 8/6/2020 to 11/6/20.    Outpatient Physical Therapy 2 times weekly for 12 weeks to include the following interventions: Aquatic Therapy, Cervical/Lumbar Traction, Electrical Stimulation IFC/NMES, Gait Training, Iontophoresis (with  Dexamethasone), Manual Therapy, Moist Heat/ Ice, Neuromuscular Re-ed, Patient Education, Self Care, Therapeutic Activites, Therapeutic Exercise, Ultrasound and Dry Needling and Virtual Visits.  Pt may be seen by PTA as part of the rehabilitation team.     Ronni Dwyer, PT  8/6/2020    I have seen the patient, reviewed the therapist's plan of care, and I agree with the plan of care.      I certify the need for these services furnished under this plan of treatment and while under my care.     ___________________ ________ Physician/Referring Practitioner            ___________________________ Date of Signature

## 2020-08-10 ENCOUNTER — OFFICE VISIT (OUTPATIENT)
Dept: PODIATRY | Facility: CLINIC | Age: 50
End: 2020-08-10
Payer: MEDICARE

## 2020-08-10 VITALS
WEIGHT: 293 LBS | SYSTOLIC BLOOD PRESSURE: 132 MMHG | DIASTOLIC BLOOD PRESSURE: 92 MMHG | HEART RATE: 77 BPM | BODY MASS INDEX: 56.76 KG/M2

## 2020-08-10 DIAGNOSIS — M72.2 PLANTAR FASCIAL FIBROMATOSIS OF LEFT FOOT: ICD-10-CM

## 2020-08-10 DIAGNOSIS — G89.4 CHRONIC PAIN SYNDROME: Primary | ICD-10-CM

## 2020-08-10 DIAGNOSIS — N18.30 TYPE 2 DIABETES MELLITUS WITH STAGE 3 CHRONIC KIDNEY DISEASE, WITH LONG-TERM CURRENT USE OF INSULIN: ICD-10-CM

## 2020-08-10 DIAGNOSIS — Z79.4 TYPE 2 DIABETES MELLITUS WITH STAGE 3 CHRONIC KIDNEY DISEASE, WITH LONG-TERM CURRENT USE OF INSULIN: ICD-10-CM

## 2020-08-10 DIAGNOSIS — E11.22 TYPE 2 DIABETES MELLITUS WITH STAGE 3 CHRONIC KIDNEY DISEASE, WITH LONG-TERM CURRENT USE OF INSULIN: ICD-10-CM

## 2020-08-10 PROCEDURE — 3075F SYST BP GE 130 - 139MM HG: CPT | Mod: HCNC,CPTII,S$GLB, | Performed by: PODIATRIST

## 2020-08-10 PROCEDURE — 3080F DIAST BP >= 90 MM HG: CPT | Mod: HCNC,CPTII,S$GLB, | Performed by: PODIATRIST

## 2020-08-10 PROCEDURE — 3044F PR MOST RECENT HEMOGLOBIN A1C LEVEL <7.0%: ICD-10-PCS | Mod: HCNC,CPTII,S$GLB, | Performed by: PODIATRIST

## 2020-08-10 PROCEDURE — 99999 PR PBB SHADOW E&M-EST. PATIENT-LVL IV: ICD-10-PCS | Mod: PBBFAC,HCNC,, | Performed by: PODIATRIST

## 2020-08-10 PROCEDURE — 3044F HG A1C LEVEL LT 7.0%: CPT | Mod: HCNC,CPTII,S$GLB, | Performed by: PODIATRIST

## 2020-08-10 PROCEDURE — 3008F PR BODY MASS INDEX (BMI) DOCUMENTED: ICD-10-PCS | Mod: HCNC,CPTII,S$GLB, | Performed by: PODIATRIST

## 2020-08-10 PROCEDURE — 3080F PR MOST RECENT DIASTOLIC BLOOD PRESSURE >= 90 MM HG: ICD-10-PCS | Mod: HCNC,CPTII,S$GLB, | Performed by: PODIATRIST

## 2020-08-10 PROCEDURE — 99214 PR OFFICE/OUTPT VISIT, EST, LEVL IV, 30-39 MIN: ICD-10-PCS | Mod: HCNC,S$GLB,, | Performed by: PODIATRIST

## 2020-08-10 PROCEDURE — 99999 PR PBB SHADOW E&M-EST. PATIENT-LVL IV: CPT | Mod: PBBFAC,HCNC,, | Performed by: PODIATRIST

## 2020-08-10 PROCEDURE — 99214 OFFICE O/P EST MOD 30 MIN: CPT | Mod: HCNC,S$GLB,, | Performed by: PODIATRIST

## 2020-08-10 PROCEDURE — 3008F BODY MASS INDEX DOCD: CPT | Mod: HCNC,CPTII,S$GLB, | Performed by: PODIATRIST

## 2020-08-10 PROCEDURE — 3075F PR MOST RECENT SYSTOLIC BLOOD PRESS GE 130-139MM HG: ICD-10-PCS | Mod: HCNC,CPTII,S$GLB, | Performed by: PODIATRIST

## 2020-08-11 ENCOUNTER — TELEPHONE (OUTPATIENT)
Dept: PHARMACY | Facility: CLINIC | Age: 50
End: 2020-08-11

## 2020-08-12 ENCOUNTER — CLINICAL SUPPORT (OUTPATIENT)
Dept: REHABILITATION | Facility: HOSPITAL | Age: 50
End: 2020-08-12
Attending: PHYSICAL MEDICINE & REHABILITATION
Payer: MEDICARE

## 2020-08-12 DIAGNOSIS — R29.898 DECREASED STRENGTH OF LOWER EXTREMITY: ICD-10-CM

## 2020-08-12 PROCEDURE — 97110 THERAPEUTIC EXERCISES: CPT | Mod: HCNC,PN

## 2020-08-12 NOTE — PROGRESS NOTES
"                                                  Physical Therapy Daily Note     Date: 08/12/2020  Name: Yanni Kaiser  Clinic Number: [id number]  Diagnosis:   Encounter Diagnosis   Name Primary?    Decreased strength of lower extremity      Physician: Carlyle Gordillo MD    Physician: Migdalia Patel, *     Physician Orders: PT Eval and Treat   Medical Diagnosis from Referral: Chronic midline low back pain with bilateral sciatica  Evaluation Date: 8/6/2020  Authorization Period Expiration: 9/6/20  Plan of Care Expiration: 2/21/21  Visit # / Visits authorized: 2/21    Time In: 1100  Time Out: 1145  Total Billable Time: 45 minutes     Precautions: Fibromyalgia, Chronic Bronchitis, T2DM with neurologic complications, Seizures      Subjective     Pt reports 9/10 low back pain that radiates down her legs both sides to her feet. L side is worse than R. Reports she hasn't been able to walk for exercise recently because of pain but might start again. Reports she tried HEP since eval but hasn't noticed a change.    Objective     Patient received individual therapy to increase strength, endurance, ROM, flexibility, posture and core stabilization with activities as follows:     Yanni received therapeutic exercises to develop strength, endurance, ROM, flexibility, posture and core stabilization for 40 minutes including:     Nustep x5 min  DKTC with SB x20  LTR 20x no SB  Pelvic tilt 20x5"  HL adduction squeeze 20x5"  Bridges with lat pull down, RTB 2x10  Clams 2x10  piriformis st 3x30"  sciatic nerve glides x20  HS Str c/ strap 2x30" ea    Yanni received the following manual therapy techniques for 5 minutes.     STM with stick B lumbar paraspinals and gluteal    Written Home Exercises Provided: no new exercises provided this session  Pt demo good understanding of the education provided. Yanni demonstrated good return demonstration of activities.     Education provided: importance of HEP  Efraintricivan " verbalized good understanding of education provided.   No spiritual or educational barriers to learning identified.    Assessment     Pt tolerated tx well. PT added various exercises to improve strength, ROM, core/LE strength. Pt challenged with all exercises, stating reports of muscle fatigue. Pt with hypersensitivity to touch B low back, L>R. Unable to get into musculature with STM stick on L side but did educate patient on desensitization.  Pt will continue to benefit from skilled PT in order to decrease pain, increase strength/ROM, and improve functional mobility.    Short Term GOALS: 6 weeks. Pt agrees with goals set.  1. Patient demonstrates independence with HEP.   2. Patient demonstrates independence with Postural Awareness.   3. Patient demonstrates independence with body mechanics.   4. Patient will report pain of 7/10 at worst, on 0-10 pain scale, with all activity  5. Patient demonstrates increased thoracolumbar ROM by 5 degrees in all planes to improve tolerance to functional activities pain free.   6. Patient demonstrates increased strength BLE's by 1/3 muscle grade or greater to improve tolerance to functional activities pain free.     Long Term GOALS: 12 weeks. Pt agrees with goals set.  1. Patient demonstrates increased thoracolumbar flexion AROM by 15 degrees or greater to improve tolerance to functional activities pain free.   2. Patient demonstrates increased strength BLE's to 4/5 or greater to improve tolerance to functional activities pain free.   3. Patient demonstrates improved overall function per FOTO Lumbar Survey to 50% Limitation or less.   4. Patient will report pain of 5/10 at worst, on 0-10 pain scale, with all activity  5. Patient demonstrates ability to walk 2 blocks, appropriate gait pattern, no pain provocation     Plan   Continue with established Plan of Care towards PT goals.      Therapist: Hermila Steiner, PT, DPT

## 2020-08-12 NOTE — PROGRESS NOTES
Subjective:      Patient ID: Yanni Kaiser is a 50 y.o. female.    Chief Complaint: Foot Pain (left foot )    She is here for follow-up bilateral foot pain.  She previously had a plantar fasciectomy.  She was doing quite well for some time and had a recent new injury.  She also was complaining of diffuse muscle pain throughout upper and lower extremities.  She has seen Endocrinology as well as Neurology and pain medicine.    Review of Systems   Constitution: Negative for chills, fever and malaise/fatigue.   HENT: Negative for hearing loss.    Cardiovascular: Negative for claudication.   Respiratory: Negative for shortness of breath.    Skin: Negative for flushing and rash.   Musculoskeletal: Negative for joint pain and myalgias.        Pain to both feet   Neurological: Negative for loss of balance, numbness, paresthesias and sensory change.   Psychiatric/Behavioral: Negative for altered mental status.           Objective:      Physical Exam  Constitutional:       Appearance: She is well-developed.   Cardiovascular:      Pulses:           Dorsalis pedis pulses are 2+ on the right side and 2+ on the left side.        Posterior tibial pulses are 2+ on the right side and 2+ on the left side.   Musculoskeletal:      Right knee: She exhibits no swelling and no ecchymosis.      Left knee: She exhibits no swelling and no ecchymosis.      Right ankle: She exhibits normal range of motion, no swelling, no ecchymosis and normal pulse. No lateral malleolus, no medial malleolus and no head of 5th metatarsal tenderness found. Achilles tendon exhibits no pain, no defect and normal Copeland's test results.      Left ankle: She exhibits normal range of motion, no swelling, no ecchymosis and normal pulse. No lateral malleolus, no medial malleolus and no head of 5th metatarsal tenderness found. Achilles tendon exhibits no pain and normal Copeland's test results.      Right lower leg: She exhibits no tenderness, no bony  tenderness, no swelling and no deformity. No edema.      Left lower leg: She exhibits no tenderness and no swelling. No edema.      Right foot: Normal range of motion. No deformity.      Left foot: Normal range of motion. No deformity.      Comments: Pain along the plantar aspect of the left foot. Maximal tenderness is along the small plantar fibroma at the distal medial band of the plantar fascia. Tenderness tracking proximally along the medial band and into the tarsal tunnel.    Right foot has tenderness along the plantar aspect of the foot and into the tarsal tunnel.  In addition, she has tenderness overlying the anterior medial anterior lateral and posterior compartments of both lower extremities.  This Is tender with pinpoint palpation.  She has multiple areas of pinpoint tenderness to both lower extremities.   Feet:      Right foot:      Protective Sensation: 5 sites tested. 5 sites sensed.      Left foot:      Protective Sensation: 5 sites tested. 5 sites sensed.   Skin:     General: Skin is warm.      Capillary Refill: Capillary refill takes more than 3 seconds.      Findings: No abrasion, bruising, burn or ecchymosis.      Comments: No open lesions noted to b/L lower extremities.     Examination of the skin reveals no evidence of significant rashes, open lesions, suspicious appearing nevi or other concerning lesions.      Neurological:      Mental Status: She is alert and oriented to person, place, and time.      Comments:   Light touch intact.  MMT 5/5 DF, PF, Inv, Ev  Normal muscle tone.  No signs of atrophy.  No tremor or spasticity.     Psychiatric:         Attention and Perception: She is attentive.         Speech: Speech normal.         Behavior: Behavior normal.               Assessment:       Encounter Diagnoses   Name Primary?    Chronic pain syndrome Yes    Plantar fascial fibromatosis of left foot     Type 2 diabetes mellitus with stage 3 chronic kidney disease, with long-term current use of  insulin          Plan:       Yanni was seen today for foot pain.    Diagnoses and all orders for this visit:    Chronic pain syndrome    Plantar fascial fibromatosis of left foot    Type 2 diabetes mellitus with stage 3 chronic kidney disease, with long-term current use of insulin      I counseled the patient on her conditions, their implications and medical management.    Continue medication for Neurology and pain management.  I believe this is pain from a more central sore secondary to lumbar stenosis and underlying fibromyalgia.  Recommend physical therapy and pain management at this point.    .

## 2020-08-19 ENCOUNTER — OFFICE VISIT (OUTPATIENT)
Dept: FAMILY MEDICINE | Facility: CLINIC | Age: 50
End: 2020-08-19
Payer: MEDICARE

## 2020-08-19 ENCOUNTER — CLINICAL SUPPORT (OUTPATIENT)
Dept: REHABILITATION | Facility: HOSPITAL | Age: 50
End: 2020-08-19
Attending: PHYSICAL MEDICINE & REHABILITATION
Payer: MEDICARE

## 2020-08-19 VITALS
DIASTOLIC BLOOD PRESSURE: 86 MMHG | TEMPERATURE: 98 F | WEIGHT: 293 LBS | BODY MASS INDEX: 50.02 KG/M2 | HEIGHT: 64 IN | OXYGEN SATURATION: 97 % | HEART RATE: 75 BPM | SYSTOLIC BLOOD PRESSURE: 125 MMHG | RESPIRATION RATE: 17 BRPM

## 2020-08-19 DIAGNOSIS — M79.10 MYALGIA: Primary | ICD-10-CM

## 2020-08-19 DIAGNOSIS — R29.898 DECREASED STRENGTH OF LOWER EXTREMITY: ICD-10-CM

## 2020-08-19 PROCEDURE — 3079F DIAST BP 80-89 MM HG: CPT | Mod: HCNC,CPTII,S$GLB, | Performed by: FAMILY MEDICINE

## 2020-08-19 PROCEDURE — 99999 PR PBB SHADOW E&M-EST. PATIENT-LVL IV: CPT | Mod: PBBFAC,HCNC,, | Performed by: FAMILY MEDICINE

## 2020-08-19 PROCEDURE — 3008F PR BODY MASS INDEX (BMI) DOCUMENTED: ICD-10-PCS | Mod: HCNC,CPTII,S$GLB, | Performed by: FAMILY MEDICINE

## 2020-08-19 PROCEDURE — 3008F BODY MASS INDEX DOCD: CPT | Mod: HCNC,CPTII,S$GLB, | Performed by: FAMILY MEDICINE

## 2020-08-19 PROCEDURE — 99214 OFFICE O/P EST MOD 30 MIN: CPT | Mod: HCNC,S$GLB,, | Performed by: FAMILY MEDICINE

## 2020-08-19 PROCEDURE — 3079F PR MOST RECENT DIASTOLIC BLOOD PRESSURE 80-89 MM HG: ICD-10-PCS | Mod: HCNC,CPTII,S$GLB, | Performed by: FAMILY MEDICINE

## 2020-08-19 PROCEDURE — 99999 PR PBB SHADOW E&M-EST. PATIENT-LVL IV: ICD-10-PCS | Mod: PBBFAC,HCNC,, | Performed by: FAMILY MEDICINE

## 2020-08-19 PROCEDURE — 3074F SYST BP LT 130 MM HG: CPT | Mod: HCNC,CPTII,S$GLB, | Performed by: FAMILY MEDICINE

## 2020-08-19 PROCEDURE — 3074F PR MOST RECENT SYSTOLIC BLOOD PRESSURE < 130 MM HG: ICD-10-PCS | Mod: HCNC,CPTII,S$GLB, | Performed by: FAMILY MEDICINE

## 2020-08-19 PROCEDURE — 99214 PR OFFICE/OUTPT VISIT, EST, LEVL IV, 30-39 MIN: ICD-10-PCS | Mod: HCNC,S$GLB,, | Performed by: FAMILY MEDICINE

## 2020-08-19 PROCEDURE — 97110 THERAPEUTIC EXERCISES: CPT | Mod: HCNC,PN

## 2020-08-19 RX ORDER — BACLOFEN 10 MG/1
10 TABLET ORAL 3 TIMES DAILY
Qty: 90 TABLET | Refills: 0 | Status: SHIPPED | OUTPATIENT
Start: 2020-08-19 | End: 2020-09-13

## 2020-08-19 RX ORDER — LEVOCETIRIZINE DIHYDROCHLORIDE 5 MG/1
TABLET, FILM COATED ORAL
COMMUNITY
Start: 2020-07-16 | End: 2020-10-12 | Stop reason: SDUPTHER

## 2020-08-19 NOTE — PROGRESS NOTES
"                                                    Physical Therapy Daily Note     Date: 08/19/2020  Name: Yanni Kaiser  [facility] Number: [id number]  Diagnosis:   Encounter Diagnosis   Name Primary?    Decreased strength of lower extremity      Physician: Carlyle Gordillo MD    Physician: Migdalia Patel, *     Physician Orders: PT Eval and Treat   Medical Diagnosis from Referral: Chronic midline low back pain with bilateral sciatica  Evaluation Date: 8/6/2020  Authorization Period Expiration: 9/6/20  Plan of Care Expiration: 2/21/21  Visit # / Visits authorized: 3/21    Time In: 1145  Time Out: 1233  Total Billable Time: 48 minutes     Precautions: Fibromyalgia, Chronic Bronchitis, T2DM with neurologic complications, Seizures      Subjective     Pt reports 9-10/10 low back pain and pain all over her body down to her feet. Reports she had a fall at [employer] on Sunday while just standing up. She isn't show how or why she fell but other [employer] goers told her she hit the pew, maybe with her head. She was unable to get out of bed on Monday but did try to do some PT exercises. She saw her PCP this morning and forgot to tell him about the fall but did mention the high levels of pain. 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 It usually causes headache and pain on L side of head.    Objective     CN screen: WNL except for ocular convergence (pt with pain in L eye and had to close eye)    Patient received individual therapy to increase strength, endurance, ROM, flexibility, posture and core stabilization with activities as follows:     Yanni received therapeutic exercises to develop strength, endurance, ROM, flexibility, posture and core stabilization for 44 minutes including:     Nustep x5 min  DKTC with SB x20  LTR 20x no SB  Pelvic tilt 20x5"  HL adduction squeeze 20x5" (only 15 today due to " "time)  Bridges with lat pull down, RTB 3x10  Clams 2x10  piriformis st 3x30"  sciatic nerve glides x20  HS Str c/ strap 2x30" ea NP secondary to time    Yanni received the following manual therapy techniques for 4 minutes.     STM with stick B lumbar paraspinals and gluteal    Written Home Exercises Provided: no new exercises provided this session  Pt demo good understanding of the education provided. Yanni demonstrated good return demonstration of activities.     Education provided: importance of HEP  Yanni verbalized good understanding of education provided.   No spiritual or educational barriers to learning identified.    Assessment     Pt tolerated tx well. PT screened for cranial nerves following a fall on Sunday in which patient hit her head. Pt saw her PCP earlier this morning but forgot to tell him so PT messaged him via Epic. Pt with increased pain today pre and post tx, tearing up with minimal pressure during STM. Pt with significant sensitivity and tightness in lumbar paraspinals but unable to tolerate STM.  Pt will continue to benefit from skilled PT in order to decrease pain, increase strength/ROM, and improve functional mobility.    Short Term GOALS: 6 weeks. Pt agrees with goals set.  1. Patient demonstrates independence with HEP.   2. Patient demonstrates independence with Postural Awareness.   3. Patient demonstrates independence with body mechanics.   4. Patient will report pain of 7/10 at worst, on 0-10 pain scale, with all activity  5. Patient demonstrates increased thoracolumbar ROM by 5 degrees in all planes to improve tolerance to functional activities pain free.   6. Patient demonstrates increased strength BLE's by 1/3 muscle grade or greater to improve tolerance to functional activities pain free.     Long Term GOALS: 12 weeks. Pt agrees with goals set.  1. Patient demonstrates increased thoracolumbar flexion AROM by 15 degrees or greater to improve tolerance to functional " activities pain free.   2. Patient demonstrates increased strength BLE's to 4/5 or greater to improve tolerance to functional activities pain free.   3. Patient demonstrates improved overall function per FOTO Lumbar Survey to 50% Limitation or less.   4. Patient will report pain of 5/10 at worst, on 0-10 pain scale, with all activity  5. Patient demonstrates ability to walk 2 blocks, appropriate gait pattern, no pain provocation     Plan   Continue with established Plan of Care towards PT goals.      Therapist: Hermila Steiner, PT, DPT

## 2020-08-19 NOTE — PROGRESS NOTES
Routine Office Visit    Patient Name: Yanni Kaiser    : 1970  MRN: [id number]    Subjective:  Yanni is a 50 y.o. female who presents today for:    1. Body aches  Patient presenting today with body aches x several weeks.  She states she hurts from her head to her toes and nothing is helping.  She reports that she has taken pain medicaitons without any relief.  The pain feels electrical and goes up her back.  There is no associated numbness or weakness.  There has been no fevers, chills, sweats or shortness of breath.      Past Medical History  Past Medical History:   Diagnosis Date    Allergy     Anemia     Asthma     Back pain     Chronic bronchitis     Cigarette smoker     DDD (degenerative disc disease), lumbar 2020    Depression     Diabetes with neurologic complications     DUB (dysfunctional uterine bleeding) 10/16/2018    GERD (gastroesophageal reflux disease)     High cholesterol     Hyperprolactinemia     Hypertension     Influenza A 2020    Obese body habitus     Pseudotumor cerebri     Renal manifestation of secondary diabetes mellitus     Respiratory failure     Seizures     Simple endometrial hyperplasia     Sleep apnea     Smoker     Tobacco dependence        Past Surgical History  Past Surgical History:   Procedure Laterality Date    BREAST CYST ASPIRATION       SECTION      X 1    CHOLECYSTECTOMY      COLONOSCOPY      COLONOSCOPY N/A 2019    Procedure: COLONOSCOPY;  Surgeon: Jagruti Sweeney MD;  Location: [facility] ([address] [place] [address]);  Service: Endoscopy;  Laterality: N/A;  BMI is 63/gastroparesis/3 days full liquid diet 1 day clear liquid see telephone encounter by Ciara limon    ESOPHAGEAL MANOMETRY WITH MEASUREMENT OF IMPEDANCE N/A 2019    Procedure: MANOMETRY-ESOPHAGEAL-WITH IMPEDANCE;  Surgeon: Jagruti Sweeney MD;  Location: [facility] ([address] [place] [address]);  Service: Endoscopy;  Laterality: N/A;  Hold Narcotics x 1 days   Hold  TCA x 1 days  Propofol only. No fentanyl or benzodiazepine during sedation. If additional sedation needed, discuss with Dr. Sweeney.  10/29 - pt confirmed appt    ESOPHAGOGASTRODUODENOSCOPY N/A 1/14/2019    Procedure: EGD (ESOPHAGOGASTRODUODENOSCOPY);  Surgeon: Jagruti Sweneey MD;  Location: [facility]);  Service: Endoscopy;  Laterality: N/A;  BMI is 63/gastroparesis/3 days full liquid diet 1 day clear liquid see telephone encounter by Ciara Brown     ESOPHAGOGASTRODUODENOSCOPY N/A 11/5/2019    Procedure: ESOPHAGOGASTRODUODENOSCOPY (EGD);  Surgeon: Jagruti Sweeney MD;  [facility]  Service: Endoscopy;  Laterality: N/A;  EGD with EndoFlip /+/- Botox  2nd floor for gastroparesis/BMI 63 **367lbs**  Bariatric Stretcher needed  Manometry probe to be placed endoscopically during EGD procedure  Due to change in protocol, Full liquid diet for 3 days/ 1 day of clear liquids  Hi    FOOT FRACTURE SURGERY Left     HYSTEROSCOPY WITH DILATION AND CURETTAGE OF UTERUS N/A 10/16/2018    K[facility]    PLANTAR FASCIOTOMY Left 11/18/2019    Procedure: FASCIOTOMY, PLANTAR;  Surgeon: Valentino Orourke DPM;  Location: [facility] OR [address] [place] [address];  Service: Podiatry;  Laterality: Left;    RETINAL LASER PROCEDURE Left     SINUS SURGERY      TRANSFORAMINAL EPIDURAL INJECTION OF STEROID Bilateral 6/24/2020    Procedure: INJECTION, STEROID, EPIDURAL, TRANSFORAMINAL APPROACH;  Surgeon: Lawrence Marin MD;  [place] [facility] [place]  Service: Pain Management;  Laterality: Bilateral;    UPPER GASTROINTESTINAL ENDOSCOPY         Family History  Family History   Problem Relation Age of Onset    Hypertension Mother     Diabetes Mother     Colon cancer Mother 50    Colon polyps Mother     Heart disease Father     COPD Father     Heart attack Father     Hypertension Father     Ulcers Father     Cancer Maternal Grandmother     Breast cancer Maternal Grandmother     Colon cancer Maternal Grandmother     Colon polyps  Maternal Grandmother     No Known Problems Paternal Grandmother     No Known Problems Brother     No Known Problems Maternal Aunt     No Known Problems Maternal Uncle     No Known Problems Paternal Aunt     No Known Problems Paternal Uncle     No Known Problems Maternal Grandfather     No Known Problems Paternal Grandfather     Celiac disease Neg Hx     Cirrhosis Neg Hx     Crohn's disease Neg Hx     Cystic fibrosis Neg Hx     Esophageal cancer Neg Hx     Hemochromatosis Neg Hx     Inflammatory bowel disease Neg Hx     Irritable bowel syndrome Neg Hx     Liver cancer Neg Hx     Liver disease Neg Hx     Rectal cancer Neg Hx     Stomach cancer Neg Hx     Ulcerative colitis Neg Hx     Jonnie's disease Neg Hx     Tuberculosis Neg Hx     Lymphoma Neg Hx     Scleroderma Neg Hx     Rheum arthritis Neg Hx     Melanoma Neg Hx     Multiple sclerosis Neg Hx     Psoriasis Neg Hx     Lupus Neg Hx     Skin cancer Neg Hx     Amblyopia Neg Hx     Blindness Neg Hx     Cataracts Neg Hx     Glaucoma Neg Hx     Macular degeneration Neg Hx     Retinal detachment Neg Hx     Strabismus Neg Hx     Stroke Neg Hx     Thyroid disease Neg Hx        Social History  Social History     Socioeconomic History    Marital status:      Spouse name: Not on file    Number of children: Not on file    Years of education: Not on file    Highest education level: Not on file   Occupational History    Not on file   Social Needs    Financial resource strain: Very hard    Food insecurity     Worry: Often true     Inability: Often true    Transportation needs     Medical: No     Non-medical: No   Tobacco Use    Smoking status: Current Every Day Smoker     Packs/day: 1.00     Years: 27.00     Pack years: 27.00     Types: Cigarettes     Start date: 1/1/1992    Smokeless tobacco: Never Used    Tobacco comment: 1/2 a pack if her days are good   Substance and Sexual Activity    Alcohol use: Not Currently      Alcohol/week: 0.0 standard drinks     Frequency: Never     Comment: socially    Drug use: No    Sexual activity: Yes     Partners: Male     Birth control/protection: None     Comment:    Lifestyle    Physical activity     Days per week: Not on file     Minutes per session: Not on file    Stress: Not on file   Relationships    Social connections     Talks on phone: More than three times a week     Gets together: Three times a week     Attends Zoroastrianism service: More than 4 times per year     Active member of club or organization: Yes     Attends meetings of clubs or organizations: More than 4 times per year     Relationship status:    Other Topics Concern    Not on file   Social History Narrative    Not on file       Current Medications  Current Outpatient Medications on File Prior to Visit   Medication Sig Dispense Refill    albuterol (PROVENTIL/VENTOLIN HFA) 90 mcg/actuation inhaler Inhale 2 puffs into the lungs every 6 (six) hours as needed. Rescue 54 g 0    albuterol-ipratropium (DUO-NEB) 2.5 mg-0.5 mg/3 mL nebulizer solution Take 3 mLs by nebulization every 6 (six) hours as needed for Wheezing or Shortness of Breath. Rescue (Patient not taking: Reported on 8/3/2020) 100 mL 3    atorvastatin (LIPITOR) 40 MG tablet Take 1 tablet (40 mg total) by mouth once daily. 90 tablet 3    blood sugar diagnostic Strp 1 each by Misc.(Non-Drug; Combo Route) route 4 (four) times daily before meals and nightly. ACCU CHEK ELVIA PLUS METER 200 each 3    blood-glucose meter (ACCU-CHEK ELVIA PLUS METER) Misc TEST FOUR TIMES DAILY BEFORE MEALS AND EVERY NIGHT 90 each 1    budesonide-formoterol 160-4.5 mcg (SYMBICORT) 160-4.5 mcg/actuation HFAA Inhale 2 puffs into the lungs every 12 (twelve) hours. Controller (Patient not taking: Reported on 8/3/2020) 1 Inhaler 5    desloratadine (CLARINEX) 5 mg tablet Take 1 tablet (5 mg total) by mouth once daily. 30 tablet 11    DULoxetine (CYMBALTA) 30 MG capsule Take 1  capsule (30 mg total) by mouth once daily. 30 capsule 3    erenumab-aooe (AIMOVIG AUTOINJECTOR) 140 mg/mL autoinjector Inject 140 mg into the skin every 28 days. 1 mL 11    ibuprofen (ADVIL,MOTRIN) 800 MG tablet Take 1 tablet (800 mg total) by mouth every 8 (eight) hours as needed for Pain. 30 tablet 5    lancets (ACCU-CHEK SOFTCLIX LANCETS) Misc 1 Device by Misc.(Non-Drug; Combo Route) route 4 (four) times daily. 200 each 3    levocetirizine (XYZAL) 5 MG tablet       lidocaine (LIDODERM) 5 % Place 1 patch onto the skin once daily. Remove & Discard patch within 12 hours or as directed by MD 15 patch 0    losartan (COZAAR) 100 MG tablet TAKE 1 TABLET(100 MG) BY MOUTH EVERY DAY 90 tablet 0    ondansetron (ZOFRAN) 8 MG tablet Take 1 tablet (8 mg total) by mouth every 8 (eight) hours as needed for Nausea. 90 tablet 5    oxyCODONE-acetaminophen (PERCOCET)  mg per tablet Take 1 tablet by mouth every 12 (twelve) hours as needed for Pain. 40 tablet 0    pantoprazole (PROTONIX) 40 MG tablet Take 40 mg by mouth once daily.   3    prucalopride 2 mg Tab Take 1 tablet by mouth once daily. 90 tablet 3    QUEtiapine (SEROQUEL) 25 MG Tab Take 1 tablet (25 mg total) by mouth once daily. 90 tablet 0    semaglutide (OZEMPIC) 1 mg/dose (2 mg/1.5 mL) PnIj Inject 1 mg subq weekly. 9 mL 1    sumatriptan (IMITREX) 50 MG tablet Take 1 tab at onset of headaches.  If no improvement in 2 hours, take another.  Do not take more than 2 tabs in 24 hours. 9 tablet 5    [DISCONTINUED] acetaZOLAMIDE (DIAMOX) 500 mg CpSR Take 1 capsule (500 mg total) by mouth 2 (two) times daily. 360 capsule 1    [DISCONTINUED] fluticasone propionate (FLONASE) 50 mcg/actuation nasal spray 1 spray (50 mcg total) by Each Nare route once daily. 15.8 mL 2    [DISCONTINUED] HYDROcodone-acetaminophen (NORCO) 5-325 mg per tablet Take 1 tablet by mouth every 12 (twelve) hours as needed for Pain. 40 tablet 0    [DISCONTINUED] topiramate (TOPAMAX) 100 MG  "tablet TAKE 3 TABLETS BY MOUTH TWICE DAILY 180 tablet 11     No current facility-administered medications on file prior to visit.        Allergies   Review of patient's allergies indicates:   Allergen Reactions    Gabapentin      Makes her twitch       Review of Systems (Pertinent positives)  Review of Systems   Constitutional: Negative.    HENT: Negative.    Eyes: Negative.    Respiratory: Negative.    Cardiovascular: Positive for leg swelling.   Gastrointestinal: Negative.    Genitourinary: Negative.    Musculoskeletal: Positive for myalgias.   Skin: Negative.          /86 (BP Location: Left arm, Patient Position: Sitting, BP Method: Large (Automatic))   Pulse 75   Temp 98 °F (36.7 °C) (Oral)   Resp 17   Ht 5' 4.02" (1.626 m)   Wt (!) 148 kg (326 lb 4.5 oz)   SpO2 97%   BMI 55.98 kg/m²     GENERAL APPEARANCE: in no apparent distress and well developed and well nourished  HEENT: PERRL, EOMI, Sclera clear, anicteric, Oropharynx clear, no lesions, Neck supple with midline trachea  NECK: normal, supple, no adenopathy, thyroid normal in size  RESPIRATORY: appears well, vitals normal, no respiratory distress, acyanotic, normal RR, chest clear, no wheezing, crepitations, rhonchi, normal symmetric air entry  HEART: regular rate and rhythm, S1, S2 normal, no murmur, click, rub or gallop.    ABDOMEN: abdomen is soft without tenderness, no masses, no hernias, no organomegaly, no rebound, no guarding. Suprapubic tenderness absent. No CVA tenderness.  NEUROLOGIC: normal without focal findings, CN II-XII are intact.   Extremities: warm/well perfused.  No abnormal hair patterns.  No clubbing, cyanosis or edema.  no muscular tenderness noted, no obvious edema present  SKIN: no rashes, no wounds, no other lesions  PSYCH: Alert, oriented x 3, thought content appropriate, speech normal, pleasant and cooperative, good eye contact, well groomed    Assessment/Plan:  Yanni Kaiser is a 50 y.o. female who presents " today for :    Yanni was seen today for pain.    Diagnoses and all orders for this visit:    Myalgia  -     baclofen (LIORESAL) 10 MG tablet; Take 1 tablet (10 mg total) by mouth 3 (three) times daily.      1.  Baclofen for possible muscle spasms  2.  Follow up as needed  3.  Call with any concerns  4.  Keep all scheduled appointments with specialists      Carlyle Gordillo MD

## 2020-08-24 ENCOUNTER — TELEPHONE (OUTPATIENT)
Dept: PAIN MEDICINE | Facility: CLINIC | Age: 50
End: 2020-08-24

## 2020-08-25 ENCOUNTER — OFFICE VISIT (OUTPATIENT)
Dept: PAIN MEDICINE | Facility: CLINIC | Age: 50
End: 2020-08-25
Payer: MEDICARE

## 2020-08-25 VITALS
SYSTOLIC BLOOD PRESSURE: 125 MMHG | TEMPERATURE: 99 F | RESPIRATION RATE: 18 BRPM | HEART RATE: 77 BPM | DIASTOLIC BLOOD PRESSURE: 86 MMHG | BODY MASS INDEX: 50.02 KG/M2 | WEIGHT: 293 LBS | HEIGHT: 64 IN

## 2020-08-25 DIAGNOSIS — M48.061 SPINAL STENOSIS OF LUMBAR REGION, UNSPECIFIED WHETHER NEUROGENIC CLAUDICATION PRESENT: ICD-10-CM

## 2020-08-25 DIAGNOSIS — Z78.9 DECREASED ACTIVITIES OF DAILY LIVING (ADL): ICD-10-CM

## 2020-08-25 DIAGNOSIS — G89.4 CHRONIC PAIN DISORDER: ICD-10-CM

## 2020-08-25 DIAGNOSIS — M54.16 LUMBAR RADICULOPATHY: Primary | ICD-10-CM

## 2020-08-25 PROCEDURE — 99213 PR OFFICE/OUTPT VISIT, EST, LEVL III, 20-29 MIN: ICD-10-PCS | Mod: HCNC,S$GLB,, | Performed by: NURSE PRACTITIONER

## 2020-08-25 PROCEDURE — 3008F BODY MASS INDEX DOCD: CPT | Mod: HCNC,CPTII,S$GLB, | Performed by: NURSE PRACTITIONER

## 2020-08-25 PROCEDURE — 3074F SYST BP LT 130 MM HG: CPT | Mod: HCNC,CPTII,S$GLB, | Performed by: NURSE PRACTITIONER

## 2020-08-25 PROCEDURE — 3079F PR MOST RECENT DIASTOLIC BLOOD PRESSURE 80-89 MM HG: ICD-10-PCS | Mod: HCNC,CPTII,S$GLB, | Performed by: NURSE PRACTITIONER

## 2020-08-25 PROCEDURE — 99999 PR PBB SHADOW E&M-EST. PATIENT-LVL V: ICD-10-PCS | Mod: PBBFAC,HCNC,, | Performed by: NURSE PRACTITIONER

## 2020-08-25 PROCEDURE — 3074F PR MOST RECENT SYSTOLIC BLOOD PRESSURE < 130 MM HG: ICD-10-PCS | Mod: HCNC,CPTII,S$GLB, | Performed by: NURSE PRACTITIONER

## 2020-08-25 PROCEDURE — 3079F DIAST BP 80-89 MM HG: CPT | Mod: HCNC,CPTII,S$GLB, | Performed by: NURSE PRACTITIONER

## 2020-08-25 PROCEDURE — 3008F PR BODY MASS INDEX (BMI) DOCUMENTED: ICD-10-PCS | Mod: HCNC,CPTII,S$GLB, | Performed by: NURSE PRACTITIONER

## 2020-08-25 PROCEDURE — 99213 OFFICE O/P EST LOW 20 MIN: CPT | Mod: HCNC,S$GLB,, | Performed by: NURSE PRACTITIONER

## 2020-08-25 PROCEDURE — 99999 PR PBB SHADOW E&M-EST. PATIENT-LVL V: CPT | Mod: PBBFAC,HCNC,, | Performed by: NURSE PRACTITIONER

## 2020-08-25 NOTE — PROGRESS NOTES
Chronic Pain - Follow Up    Referring Physician: No ref. provider found    Chief Complaint:   Chief Complaint   Patient presents with    Low-back Pain        SUBJECTIVE:    Interval History 8/25/2020:  The patient is here today for follow-up of chronic lower back pain.  The pain radiates down the posterolateral aspect of both legs to her shin.  She had limited benefit with bilateral L4/5 TF NAKITA in the past.  We previously discussed IL NAKITA which she would like to schedule.  Since her previous encounter, she has started physical therapy which she thinks is helpful.  She has increased her home activity regimen as previously discussed.  Her pain today is 9/10    Interval History 7/14/2020:  The patient presents for follow up of lower back and leg pain.  She is s/p bilateral L4/5 TF NAKITA on 6/24/20 with limited benefit of leg pain.  She denies any respiratory changes such as fever, cough, or shortness of breath.  She continues to report pain that starts across the lower back with radiation down the side of both legs.  She has associated numbness and tingling to both legs.  Her pain worsens when she walks and when she is active.  She says that she had to PT appointments did not have follow-up appointments made.  The previous note indicates that she is to continue with PT she is seeing [place] for chronic pain as occasion.  She says that she has been taking Lyrica at night that is making her wake up in the middle of the night with dizziness.  She stops it as instructed by PCP.  She has an appointment with her neurologist tomorrow.  Her pain today is 10/10.    Previous Encounter:  Yanni Kaiser presents to the clinic for the evaluation of lower back pain. The pain started 3 years ago following no specific incident and symptoms have been worsening.The pain is located in the lumbar area and radiates to the left leg mostly but also into the right leg. Non-specific dermatomal distribution.  The pain is described  as aching, sharp and shooting and is rated as 10/10. The pain is rated with a score of  10/10 on the BEST day and a score of 10/10 on the WORST day.  Symptoms interfere with daily activity and sleeping. The pain is exacerbated by Sitting, Walking, Lifting and Getting out of bed/chair.  The pain is mitigated by heat, ice, medications and physical therapy. She reports spending 4 hours per day reclining. The patient reports 0 hours of uninterrupted sleep per night.        Physical Therapy/Home Exercise: yes currently    Pain Disability Index Review:  Last 3 PDI Scores 8/25/2020 7/14/2020 6/9/2020   Pain Disability Index (PDI) 64 [phone]       Pain Medications:    - Opioids: Percocet (Oxycodone/Acetaminophen)  - Adjuvant Medications: Advil,Motrin ( Ibuprofen)   Lidoderm patch, without benefit  Voltaren gel, without benefit  Gabapentin, side effects  Lamotrigine     report:  Reviewed and consistent with medication use as prescribed.    Pain Procedures:   6/4/20 Bilateral L4/5 TF NAKITA- no relief    Imaging:   Narrative     EXAMINATION:  MRI LUMBAR SPINE WITHOUT CONTRAST    CLINICAL HISTORY:  spinal stenosis; Lumbago with sciatica, right side    TECHNIQUE:  Multiplanar, multisequence MR images were acquired from the thoracolumbar junction to the sacrum without contrast.    COMPARISON:  MRI lumbar spine 08/28/2018    FINDINGS:  Straightening of the normal lumbar lordosis.  Mild grade 1 retrolisthesis L5 on S1    Vertebral body heights are maintained.  Several T1 and T2 hyperintense lesions within the L2 and L3 vertebral body favored to represent osseous hemangiomas.  Otherwise normal marrow signal.  No fracture.    Multilevel partial disc desiccation throughout the lumbar spine.  Posterior annular fissuring at L2-L3.    Distal spinal cord is unremarkable.  Conus terminates at L1-L2.    Degenerative findings:    T12-L1: No disc herniation, spinal canal stenosis, or neural foraminal narrowing.    L1-L2: No disc herniation,  spinal canal stenosis, or neural foraminal narrowing.    L2-L3: No disc herniation, spinal canal stenosis or neural foraminal narrowing.    L3-L4: Mild circumferential disc bulge without spinal canal stenosis or neural foraminal narrowing.    L4-L5: Circumferential disc bulge, facet arthropathy, and ligamentum flavum hypertrophy resulting in moderate spinal canal stenosis and moderate bilateral neural foraminal narrowing.    L5-S1: Circumferential disc bulge and facet arthropathy without spinal canal stenosis or neural foraminal narrowing.    Paraspinal muscles and soft tissues are unremarkable.      Impression       Multilevel lumbar spondylosis most prominent at L4-5 resulting in moderate spinal canal stenosis and moderate bilateral neural foraminal narrowing.  Findings have slightly progressed in comparison to prior study dated 2018.    Electronically signed by resident: Fady Reese  Date: 2020  Time: 08:27    Electronically signed by: Abelardo Gold MD  Date: 2020  Time: 08:57         Past Medical History:   Diagnosis Date    Allergy     Anemia     Asthma     Back pain     Chronic bronchitis     Cigarette smoker     DDD (degenerative disc disease), lumbar 2020    Depression     Diabetes with neurologic complications     DUB (dysfunctional uterine bleeding) 10/16/2018    GERD (gastroesophageal reflux disease)     High cholesterol     Hyperprolactinemia     Hypertension     Influenza A 2020    Obese body habitus     Pseudotumor cerebri     Renal manifestation of secondary diabetes mellitus     Respiratory failure     Seizures     Simple endometrial hyperplasia 2018    Sleep apnea     Smoker     Tobacco dependence      Past Surgical History:   Procedure Laterality Date    BREAST CYST ASPIRATION       SECTION      X 1    CHOLECYSTECTOMY      COLONOSCOPY      COLONOSCOPY N/A 2019    Procedure: COLONOSCOPY;  Surgeon: Jagruti Sweeney MD;   Location: [facility] (2ND FLR);  Service: Endoscopy;  Laterality: N/A;  BMI is 63/gastroparesis/3 days full liquid diet 1 day clear liquid see telephone encounter by Ciara Brown     ESOPHAGEAL MANOMETRY WITH MEASUREMENT OF IMPEDANCE N/A 11/5/2019    Procedure: MANOMETRY-ESOPHAGEAL-WITH IMPEDANCE;  Surgeon: Jagruti Sweeney MD;  Location: [facility] (2ND FLR);  Service: Endoscopy;  Laterality: N/A;  Hold Narcotics x 1 days   Hold TCA x 1 days  Propofol only. No fentanyl or benzodiazepine during sedation. If additional sedation needed, discuss with Dr. Sweeney.  10/29 - pt confirmed appt    ESOPHAGOGASTRODUODENOSCOPY N/A 1/14/2019    Procedure: EGD (ESOPHAGOGASTRODUODENOSCOPY);  Surgeon: Jagruti Sweeney MD;  Location: [facility] (2ND FLR);  Service: Endoscopy;  Laterality: N/A;  BMI is 63/gastroparesis/3 days full liquid diet 1 day clear liquid see telephone encounter by Ciara limon    ESOPHAGOGASTRODUODENOSCOPY N/A 11/5/2019    Procedure: ESOPHAGOGASTRODUODENOSCOPY (EGD);  Surgeon: Jagruti Sweeney MD;  Location: [facility] (2ND FLR);  Service: Endoscopy;  Laterality: N/A;  EGD with EndoFlip /+/- Botox  2nd floor for gastroparesis/BMI 63 **367lbs**  Bariatric Stretcher needed  Manometry probe to be placed endoscopically during EGD procedure  Due to change in protocol, Full liquid diet for 3 days/ 1 day of clear liquids  Hi    FOOT FRACTURE SURGERY Left     HYSTEROSCOPY WITH DILATION AND CURETTAGE OF UTERUS N/A 10/16/2018    [facility]    PLANTAR FASCIOTOMY Left 11/18/2019    Procedure: FASCIOTOMY, PLANTAR;  Surgeon: Valentino Orourke DPM;  Location: [facility] OR 2ND FLR;  Service: Podiatry;  Laterality: Left;    RETINAL LASER PROCEDURE Left     SINUS SURGERY      TRANSFORAMINAL EPIDURAL INJECTION OF STEROID Bilateral 6/24/2020    Procedure: INJECTION, STEROID, EPIDURAL, TRANSFORAMINAL APPROACH;  Surgeon: Lawrence Marin MD;  Location: [facility];  Service: Pain Management;  Laterality: Bilateral;     UPPER GASTROINTESTINAL ENDOSCOPY       Social History     Socioeconomic History    Marital status:      Spouse name: Not on file    Number of children: Not on file    Years of education: Not on file    Highest education level: Not on file   Occupational History    Not on file   Social Needs    Financial resource strain: Very hard    Food insecurity     Worry: Often true     Inability: Often true    Transportation needs     Medical: No     Non-medical: No   Tobacco Use    Smoking status: Current Every Day Smoker     Packs/day: 1.00     Years: 27.00     Pack years: 27.00     Types: Cigarettes     Start date: 1/1/1992    Smokeless tobacco: Never Used    Tobacco comment: 1/2 a pack if her days are good   Substance and Sexual Activity    Alcohol use: Not Currently     Alcohol/week: 0.0 standard drinks     Frequency: Never     Comment: socially    Drug use: No    Sexual activity: Yes     Partners: Male     Birth control/protection: None     Comment:    Lifestyle    Physical activity     Days per week: Not on file     Minutes per session: Not on file    Stress: Not on file   Relationships    Social connections     Talks on phone: More than three times a week     Gets together: Three times a week     Attends Judaism service: More than 4 times per year     Active member of club or organization: Yes     Attends meetings of clubs or organizations: More than 4 times per year     Relationship status:    Other Topics Concern    Not on file   Social History Narrative    Not on file     Family History   Problem Relation Age of Onset    Hypertension Mother     Diabetes Mother     Colon cancer Mother 50    Colon polyps Mother     Heart disease Father     COPD Father     Heart attack Father     Hypertension Father     Ulcers Father     Cancer Maternal Grandmother     Breast cancer Maternal Grandmother     Colon cancer Maternal Grandmother     Colon polyps Maternal Grandmother      No Known Problems Paternal Grandmother     No Known Problems Brother     No Known Problems Maternal Aunt     No Known Problems Maternal Uncle     No Known Problems Paternal Aunt     No Known Problems Paternal Uncle     No Known Problems Maternal Grandfather     No Known Problems Paternal Grandfather     Celiac disease Neg Hx     Cirrhosis Neg Hx     Crohn's disease Neg Hx     Cystic fibrosis Neg Hx     Esophageal cancer Neg Hx     Hemochromatosis Neg Hx     Inflammatory bowel disease Neg Hx     Irritable bowel syndrome Neg Hx     Liver cancer Neg Hx     Liver disease Neg Hx     Rectal cancer Neg Hx     Stomach cancer Neg Hx     Ulcerative colitis Neg Hx     Jonnie's disease Neg Hx     Tuberculosis Neg Hx     Lymphoma Neg Hx     Scleroderma Neg Hx     Rheum arthritis Neg Hx     Melanoma Neg Hx     Multiple sclerosis Neg Hx     Psoriasis Neg Hx     Lupus Neg Hx     Skin cancer Neg Hx     Amblyopia Neg Hx     Blindness Neg Hx     Cataracts Neg Hx     Glaucoma Neg Hx     Macular degeneration Neg Hx     Retinal detachment Neg Hx     Strabismus Neg Hx     Stroke Neg Hx     Thyroid disease Neg Hx        Review of patient's allergies indicates:   Allergen Reactions    Gabapentin      Makes her twitch       Current Outpatient Medications   Medication Sig    albuterol (PROVENTIL/VENTOLIN HFA) 90 mcg/actuation inhaler Inhale 2 puffs into the lungs every 6 (six) hours as needed. Rescue    albuterol-ipratropium (DUO-NEB) 2.5 mg-0.5 mg/3 mL nebulizer solution Take 3 mLs by nebulization every 6 (six) hours as needed for Wheezing or Shortness of Breath. Rescue (Patient not taking: Reported on 8/3/2020)    atorvastatin (LIPITOR) 40 MG tablet Take 1 tablet (40 mg total) by mouth once daily.    baclofen (LIORESAL) 10 MG tablet Take 1 tablet (10 mg total) by mouth 3 (three) times daily.    blood sugar diagnostic Strp 1 each by Misc.(Non-Drug; Combo Route) route 4 (four) times daily before  meals and nightly. ACCU CHEK ELVIA PLUS METER    blood-glucose meter (ACCU-CHEK ELVIA PLUS METER) Misc TEST FOUR TIMES DAILY BEFORE MEALS AND EVERY NIGHT    budesonide-formoterol 160-4.5 mcg (SYMBICORT) 160-4.5 mcg/actuation HFAA Inhale 2 puffs into the lungs every 12 (twelve) hours. Controller (Patient not taking: Reported on 8/3/2020)    desloratadine (CLARINEX) 5 mg tablet Take 1 tablet (5 mg total) by mouth once daily.    DULoxetine (CYMBALTA) 30 MG capsule Take 1 capsule (30 mg total) by mouth once daily.    erenumab-aooe (AIMOVIG AUTOINJECTOR) 140 mg/mL autoinjector Inject 140 mg into the skin every 28 days.    ibuprofen (ADVIL,MOTRIN) 800 MG tablet Take 1 tablet (800 mg total) by mouth every 8 (eight) hours as needed for Pain.    lancets (ACCU-CHEK SOFTCLIX LANCETS) Misc 1 Device by Misc.(Non-Drug; Combo Route) route 4 (four) times daily.    levocetirizine (XYZAL) 5 MG tablet     lidocaine (LIDODERM) 5 % Place 1 patch onto the skin once daily. Remove & Discard patch within 12 hours or as directed by MD    losartan (COZAAR) 100 MG tablet TAKE 1 TABLET(100 MG) BY MOUTH EVERY DAY    ondansetron (ZOFRAN) 8 MG tablet Take 1 tablet (8 mg total) by mouth every 8 (eight) hours as needed for Nausea.    oxyCODONE-acetaminophen (PERCOCET)  mg per tablet Take 1 tablet by mouth every 12 (twelve) hours as needed for Pain.    pantoprazole (PROTONIX) 40 MG tablet Take 40 mg by mouth once daily.     prucalopride 2 mg Tab Take 1 tablet by mouth once daily.    QUEtiapine (SEROQUEL) 25 MG Tab Take 1 tablet (25 mg total) by mouth once daily.    semaglutide (OZEMPIC) 1 mg/dose (2 mg/1.5 mL) PnIj Inject 1 mg subq weekly.    sumatriptan (IMITREX) 50 MG tablet Take 1 tab at onset of headaches.  If no improvement in 2 hours, take another.  Do not take more than 2 tabs in 24 hours.     No current facility-administered medications for this visit.        REVIEW OF SYSTEMS:    GENERAL:  No weight loss, malaise or  "fevers.  HEENT:  + headaches  NECK:  Negative for lumps, goiter, pain and significant neck swelling.  RESPIRATORY:  Negative for cough, wheezing or shortness of breath. + SHARATH  CARDIOVASCULAR:  Negative for chest pain, leg swelling or palpitations.  GI:  Negative for abdominal discomfort, blood in stools or black stools or change in bowel habits.+ GERD  MUSCULOSKELETAL:  See HPI.  SKIN:  Negative for lesions, rash, and itching.  PSYCH:  + sleep disturbance and mood disorders  HEMATOLOGY/LYMPHOLOGY:  Negative for prolonged bleeding, bruising easily or swollen nodes.  NEURO:   No h/o syncope, paralysis. + h/o of seizures  All other reviewed and negative other than HPI.    OBJECTIVE:    /86   Pulse 77   Temp 98.8 °F (37.1 °C) (Oral)   Resp 18   Ht 5' 4" (1.626 m)   Wt (!) 145.6 kg (321 lb)   BMI 55.10 kg/m²     PHYSICAL EXAMINATION:    General appearance: Well appearing, in no acute distress, alert and oriented x3.  Psych:  Mood and affect appropriate.  Skin: Skin color, texture, turgor normal, no rashes or lesions, in both upper and lower body.  Head/face:  Normocephalic, atraumatic. No palpable lymph nodes.  GI:  Soft and non-tender.  Back: There is TTP over facet joint and paraspinal muscles bilaterally.  Limited ROM with pain on flexion and extension.  Positive facet loading bilaterally, R>L.  SLR is positive at right L5 distribution.  Extremities: Peripheral joint ROM is full and pain free without obvious instability or laxity in all four extremities. No deformities, edema, or skin discoloration. Good capillary refill.  Musculoskeletal:   No atrophy or tone abnormalities are noted.  Neuro: Decreased sensation to BLE at L4 and L5 distributions.  Gait: Antalgic- ambulates without assistance.    ASSESSMENT: 50 y.o. year old female with low back and leg pain, consistent with lumbar radiculopathy     1. Lumbar radiculopathy     2. Spinal stenosis of lumbar region, unspecified whether neurogenic claudication " present     3. Chronic pain disorder     4. Decreased activities of daily living (ADL)           PLAN:     - I have stressed the importance of physical activity and a home exercise plan to help with pain and improve health.  - Schedule for L4/5 IL NAKITA.  - Continue with PT.  I encouraged her to keep up with this as well as home exercise.  - RTC 2 weeks after NAKITA.  - Counseled patient regarding the importance of smoking cessation and relationship with pain.    The above plan and management options were discussed at length with patient. Patient is in agreement with the above and verbalized understanding.   Laverne Taylor  08/25/2020

## 2020-08-25 NOTE — H&P (VIEW-ONLY)
Chronic Pain - Follow Up    Referring Physician: No ref. provider found    Chief Complaint:   Chief Complaint   Patient presents with    Low-back Pain        SUBJECTIVE:    Interval History 8/25/2020:  The patient is here today for follow-up of chronic lower back pain.  The pain radiates down the posterolateral aspect of both legs to her shin.  She had limited benefit with bilateral L4/5 TF NAKITA in the past.  We previously discussed IL NAKITA which she would like to schedule.  Since her previous encounter, she has started physical therapy which she thinks is helpful.  She has increased her home activity regimen as previously discussed.  Her pain today is 9/10    Interval History 7/14/2020:  The patient presents for follow up of lower back and leg pain.  She is s/p bilateral L4/5 TF NAKITA on 6/24/20 with limited benefit of leg pain.  She denies any respiratory changes such as fever, cough, or shortness of breath.  She continues to report pain that starts across the lower back with radiation down the side of both legs.  She has associated numbness and tingling to both legs.  Her pain worsens when she walks and when she is active.  She says that she had to PT appointments did not have follow-up appointments made.  The previous note indicates that she is to continue with PT she is seeing [place] for chronic pain as occasion.  She says that she has been taking Lyrica at night that is making her wake up in the middle of the night with dizziness.  She stops it as instructed by PCP.  She has an appointment with her neurologist tomorrow.  Her pain today is 10/10.    Previous Encounter:  Yanni Kaiser presents to the clinic for the evaluation of lower back pain. The pain started 3 years ago following no specific incident and symptoms have been worsening.The pain is located in the lumbar area and radiates to the left leg mostly but also into the right leg. Non-specific dermatomal distribution.  The pain is described  as aching, sharp and shooting and is rated as 10/10. The pain is rated with a score of  10/10 on the BEST day and a score of 10/10 on the WORST day.  Symptoms interfere with daily activity and sleeping. The pain is exacerbated by Sitting, Walking, Lifting and Getting out of bed/chair.  The pain is mitigated by heat, ice, medications and physical therapy. She reports spending 4 hours per day reclining. The patient reports 0 hours of uninterrupted sleep per night.        Physical Therapy/Home Exercise: yes currently    Pain Disability Index Review:  Last 3 PDI Scores 8/25/2020 7/14/2020 6/9/2020   Pain Disability Index (PDI) 64 [phone] 69       Pain Medications:    - Opioids: Percocet (Oxycodone/Acetaminophen)  - Adjuvant Medications: Advil,Motrin ( Ibuprofen)   Lidoderm patch, without benefit  Voltaren gel, without benefit  Gabapentin, side effects  Lamotrigine     report:  Reviewed and consistent with medication use as prescribed.    Pain Procedures:   6/4/20 Bilateral L4/5 TF NAKITA- no relief    Imaging:   Narrative     EXAMINATION:  MRI LUMBAR SPINE WITHOUT CONTRAST    CLINICAL HISTORY:  spinal stenosis; Lumbago with sciatica, right side    TECHNIQUE:  Multiplanar, multisequence MR images were acquired from the thoracolumbar junction to the sacrum without contrast.    COMPARISON:  MRI lumbar spine 08/28/2018    FINDINGS:  Straightening of the normal lumbar lordosis.  Mild grade 1 retrolisthesis L5 on S1    Vertebral body heights are maintained.  Several T1 and T2 hyperintense lesions within the L2 and L3 vertebral body favored to represent osseous hemangiomas.  Otherwise normal marrow signal.  No fracture.    Multilevel partial disc desiccation throughout the lumbar spine.  Posterior annular fissuring at L2-L3.    Distal spinal cord is unremarkable.  Conus terminates at L1-L2.    Degenerative findings:    T12-L1: No disc herniation, spinal canal stenosis, or neural foraminal narrowing.    L1-L2: No disc herniation,  spinal canal stenosis, or neural foraminal narrowing.    L2-L3: No disc herniation, spinal canal stenosis or neural foraminal narrowing.    L3-L4: Mild circumferential disc bulge without spinal canal stenosis or neural foraminal narrowing.    L4-L5: Circumferential disc bulge, facet arthropathy, and ligamentum flavum hypertrophy resulting in moderate spinal canal stenosis and moderate bilateral neural foraminal narrowing.    L5-S1: Circumferential disc bulge and facet arthropathy without spinal canal stenosis or neural foraminal narrowing.    Paraspinal muscles and soft tissues are unremarkable.      Impression       Multilevel lumbar spondylosis most prominent at L4-5 resulting in moderate spinal canal stenosis and moderate bilateral neural foraminal narrowing.  Findings have slightly progressed in comparison to prior study dated 2018.    Electronically signed by resident: Fady Reese  Date: 2020  Time: 08:27    Electronically signed by: Abelardo Gold MD  Date: 2020  Time: 08:57         Past Medical History:   Diagnosis Date    Allergy     Anemia     Asthma     Back pain     Chronic bronchitis     Cigarette smoker     DDD (degenerative disc disease), lumbar 2020    Depression     Diabetes with neurologic complications     DUB (dysfunctional uterine bleeding) 10/16/2018    GERD (gastroesophageal reflux disease)     High cholesterol     Hyperprolactinemia     Hypertension     Influenza A 2020    Obese body habitus     Pseudotumor cerebri     Renal manifestation of secondary diabetes mellitus     Respiratory failure     Seizures     Simple endometrial hyperplasia 2018    Sleep apnea     Smoker     Tobacco dependence      Past Surgical History:   Procedure Laterality Date    BREAST CYST ASPIRATION       SECTION      X 1    CHOLECYSTECTOMY      COLONOSCOPY      COLONOSCOPY N/A 2019    Procedure: COLONOSCOPY;  Surgeon: Jagruti Sweeney MD;   Location: [facility] (2ND FLR);  Service: Endoscopy;  Laterality: N/A;  BMI is 63/gastroparesis/3 days full liquid diet 1 day clear liquid see telephone encounter by Ciara Brown     ESOPHAGEAL MANOMETRY WITH MEASUREMENT OF IMPEDANCE N/A 11/5/2019    Procedure: MANOMETRY-ESOPHAGEAL-WITH IMPEDANCE;  Surgeon: Jagruti Sweeney MD;  Location: [facility] (2ND FLR);  Service: Endoscopy;  Laterality: N/A;  Hold Narcotics x 1 days   Hold TCA x 1 days  Propofol only. No fentanyl or benzodiazepine during sedation. If additional sedation needed, discuss with Dr. Sweeney.  10/29 - pt confirmed appt    ESOPHAGOGASTRODUODENOSCOPY N/A 1/14/2019    Procedure: EGD (ESOPHAGOGASTRODUODENOSCOPY);  Surgeon: Jagruti Sweeney MD;  Location: [facility] (2ND FLR);  Service: Endoscopy;  Laterality: N/A;  BMI is 63/gastroparesis/3 days full liquid diet 1 day clear liquid see telephone encounter by Ciara limon    ESOPHAGOGASTRODUODENOSCOPY N/A 11/5/2019    Procedure: ESOPHAGOGASTRODUODENOSCOPY (EGD);  Surgeon: Jagruti Sweeney MD;  Location: [facility] (2ND FLR);  Service: Endoscopy;  Laterality: N/A;  EGD with EndoFlip /+/- Botox  2nd floor for gastroparesis/BMI 63 **367lbs**  Bariatric Stretcher needed  Manometry probe to be placed endoscopically during EGD procedure  Due to change in protocol, Full liquid diet for 3 days/ 1 day of clear liquids  Hi    FOOT FRACTURE SURGERY Left     HYSTEROSCOPY WITH DILATION AND CURETTAGE OF UTERUS N/A 10/16/2018    [facility]    PLANTAR FASCIOTOMY Left 11/18/2019    Procedure: FASCIOTOMY, PLANTAR;  Surgeon: Valentino Orourke DPM;  Location: [facility] OR 2ND FLR;  Service: Podiatry;  Laterality: Left;    RETINAL LASER PROCEDURE Left     SINUS SURGERY      TRANSFORAMINAL EPIDURAL INJECTION OF STEROID Bilateral 6/24/2020    Procedure: INJECTION, STEROID, EPIDURAL, TRANSFORAMINAL APPROACH;  Surgeon: Lawrence Marin MD;  Location: [facility]IN M[facility];  Service: Pain Management;  Laterality: Bilateral;     UPPER GASTROINTESTINAL ENDOSCOPY       Social History     Socioeconomic History    Marital status:      Spouse name: Not on file    Number of children: Not on file    Years of education: Not on file    Highest education level: Not on file   Occupational History    Not on file   Social Needs    Financial resource strain: Very hard    Food insecurity     Worry: Often true     Inability: Often true    Transportation needs     Medical: No     Non-medical: No   Tobacco Use    Smoking status: Current Every Day Smoker     Packs/day: 1.00     Years: 27.00     Pack years: 27.00     Types: Cigarettes     Start date: 1/1/1992    Smokeless tobacco: Never Used    Tobacco comment: 1/2 a pack if her days are good   Substance and Sexual Activity    Alcohol use: Not Currently     Alcohol/week: 0.0 standard drinks     Frequency: Never     Comment: socially    Drug use: No    Sexual activity: Yes     Partners: Male     Birth control/protection: None     Comment:    Lifestyle    Physical activity     Days per week: Not on file     Minutes per session: Not on file    Stress: Not on file   Relationships    Social connections     Talks on phone: More than three times a week     Gets together: Three times a week     Attends [employer] service: More than 4 times per year     Active member of club or organization: Yes     Attends meetings of clubs or organizations: More than 4 times per year     Relationship status:    Other Topics Concern    Not on file   Social History Narrative    Not on file     Family History   Problem Relation Age of Onset    Hypertension Mother     Diabetes Mother     Colon cancer Mother 50    Colon polyps Mother     Heart disease Father     COPD Father     Heart attack Father     Hypertension Father     Ulcers Father     Cancer Maternal Grandmother     Breast cancer Maternal Grandmother     Colon cancer Maternal Grandmother     Colon polyps Maternal Grandmother      No Known Problems Paternal Grandmother     No Known Problems Brother     No Known Problems Maternal Aunt     No Known Problems Maternal Uncle     No Known Problems Paternal Aunt     No Known Problems Paternal Uncle     No Known Problems Maternal Grandfather     No Known Problems Paternal Grandfather     Celiac disease Neg Hx     Cirrhosis Neg Hx     Crohn's disease Neg Hx     Cystic fibrosis Neg Hx     Esophageal cancer Neg Hx     Hemochromatosis Neg Hx     Inflammatory bowel disease Neg Hx     Irritable bowel syndrome Neg Hx     Liver cancer Neg Hx     Liver disease Neg Hx     Rectal cancer Neg Hx     Stomach cancer Neg Hx     Ulcerative colitis Neg Hx     Jonnie's disease Neg Hx     Tuberculosis Neg Hx     Lymphoma Neg Hx     Scleroderma Neg Hx     Rheum arthritis Neg Hx     Melanoma Neg Hx     Multiple sclerosis Neg Hx     Psoriasis Neg Hx     Lupus Neg Hx     Skin cancer Neg Hx     Amblyopia Neg Hx     Blindness Neg Hx     Cataracts Neg Hx     Glaucoma Neg Hx     Macular degeneration Neg Hx     Retinal detachment Neg Hx     Strabismus Neg Hx     Stroke Neg Hx     Thyroid disease Neg Hx        Review of patient's allergies indicates:   Allergen Reactions    Gabapentin      Makes her twitch       Current Outpatient Medications   Medication Sig    albuterol (PROVENTIL/VENTOLIN HFA) 90 mcg/actuation inhaler Inhale 2 puffs into the lungs every 6 (six) hours as needed. Rescue    albuterol-ipratropium (DUO-NEB) 2.5 mg-0.5 mg/3 mL nebulizer solution Take 3 mLs by nebulization every 6 (six) hours as needed for Wheezing or Shortness of Breath. Rescue (Patient not taking: Reported on 8/3/2020)    atorvastatin (LIPITOR) 40 MG tablet Take 1 tablet (40 mg total) by mouth once daily.    baclofen (LIORESAL) 10 MG tablet Take 1 tablet (10 mg total) by mouth 3 (three) times daily.    blood sugar diagnostic Strp 1 each by Misc.(Non-Drug; Combo Route) route 4 (four) times daily before  meals and nightly. ACCU CHEK ELVIA PLUS METER    blood-glucose meter (ACCU-CHEK ELVIA PLUS METER) Misc TEST FOUR TIMES DAILY BEFORE MEALS AND EVERY NIGHT    budesonide-formoterol 160-4.5 mcg (SYMBICORT) 160-4.5 mcg/actuation HFAA Inhale 2 puffs into the lungs every 12 (twelve) hours. Controller (Patient not taking: Reported on 8/3/2020)    desloratadine (CLARINEX) 5 mg tablet Take 1 tablet (5 mg total) by mouth once daily.    DULoxetine (CYMBALTA) 30 MG capsule Take 1 capsule (30 mg total) by mouth once daily.    erenumab-aooe (AIMOVIG AUTOINJECTOR) 140 mg/mL autoinjector Inject 140 mg into the skin every 28 days.    ibuprofen (ADVIL,MOTRIN) 800 MG tablet Take 1 tablet (800 mg total) by mouth every 8 (eight) hours as needed for Pain.    lancets (ACCU-CHEK SOFTCLIX LANCETS) Misc 1 Device by Misc.(Non-Drug; Combo Route) route 4 (four) times daily.    levocetirizine (XYZAL) 5 MG tablet     lidocaine (LIDODERM) 5 % Place 1 patch onto the skin once daily. Remove & Discard patch within 12 hours or as directed by MD    losartan (COZAAR) 100 MG tablet TAKE 1 TABLET(100 MG) BY MOUTH EVERY DAY    ondansetron (ZOFRAN) 8 MG tablet Take 1 tablet (8 mg total) by mouth every 8 (eight) hours as needed for Nausea.    oxyCODONE-acetaminophen (PERCOCET)  mg per tablet Take 1 tablet by mouth every 12 (twelve) hours as needed for Pain.    pantoprazole (PROTONIX) 40 MG tablet Take 40 mg by mouth once daily.     prucalopride 2 mg Tab Take 1 tablet by mouth once daily.    QUEtiapine (SEROQUEL) 25 MG Tab Take 1 tablet (25 mg total) by mouth once daily.    semaglutide (OZEMPIC) 1 mg/dose (2 mg/1.5 mL) PnIj Inject 1 mg subq weekly.    sumatriptan (IMITREX) 50 MG tablet Take 1 tab at onset of headaches.  If no improvement in 2 hours, take another.  Do not take more than 2 tabs in 24 hours.     No current facility-administered medications for this visit.        REVIEW OF SYSTEMS:    GENERAL:  No weight loss, malaise or  "fevers.  HEENT:  + headaches  NECK:  Negative for lumps, goiter, pain and significant neck swelling.  RESPIRATORY:  Negative for cough, wheezing or shortness of breath. + SHARATH  CARDIOVASCULAR:  Negative for chest pain, leg swelling or palpitations.  GI:  Negative for abdominal discomfort, blood in stools or black stools or change in bowel habits.+ GERD  MUSCULOSKELETAL:  See HPI.  SKIN:  Negative for lesions, rash, and itching.  PSYCH:  + sleep disturbance and mood disorders  HEMATOLOGY/LYMPHOLOGY:  Negative for prolonged bleeding, bruising easily or swollen nodes.  NEURO:   No h/o syncope, paralysis. + h/o of seizures  All other reviewed and negative other than HPI.    OBJECTIVE:    /86   Pulse 77   Temp 98.8 °F (37.1 °C) (Oral)   Resp 18   Ht 5' 4" (1.626 m)   Wt (!) 145.6 kg (321 lb)   BMI 55.10 kg/m²     PHYSICAL EXAMINATION:    General appearance: Well appearing, in no acute distress, alert and oriented x3.  Psych:  Mood and affect appropriate.  Skin: Skin color, texture, turgor normal, no rashes or lesions, in both upper and lower body.  Head/face:  Normocephalic, atraumatic. No palpable lymph nodes.  GI:  Soft and non-tender.  Back: There is TTP over facet joint and paraspinal muscles bilaterally.  Limited ROM with pain on flexion and extension.  Positive facet loading bilaterally, R>L.  SLR is positive at right L5 distribution.  Extremities: Peripheral joint ROM is full and pain free without obvious instability or laxity in all four extremities. No deformities, edema, or skin discoloration. Good capillary refill.  Musculoskeletal:   No atrophy or tone abnormalities are noted.  Neuro: Decreased sensation to BLE at L4 and L5 distributions.  Gait: Antalgic- ambulates without assistance.    ASSESSMENT: 50 y.o. year old female with low back and leg pain, consistent with lumbar radiculopathy     1. Lumbar radiculopathy     2. Spinal stenosis of lumbar region, unspecified whether neurogenic claudication " present     3. Chronic pain disorder     4. Decreased activities of daily living (ADL)           PLAN:     - I have stressed the importance of physical activity and a home exercise plan to help with pain and improve health.  - Schedule for L4/5 IL NAKITA.  - Continue with PT.  I encouraged her to keep up with this as well as home exercise.  - RTC 2 weeks after NAKITA.  - Counseled patient regarding the importance of smoking cessation and relationship with pain.    The above plan and management options were discussed at length with patient. Patient is in agreement with the above and verbalized understanding.   Laverne Taylor  08/25/2020

## 2020-08-26 ENCOUNTER — OFFICE VISIT (OUTPATIENT)
Dept: FAMILY MEDICINE | Facility: CLINIC | Age: 50
End: 2020-08-26
Payer: MEDICARE

## 2020-08-26 ENCOUNTER — CLINICAL SUPPORT (OUTPATIENT)
Dept: REHABILITATION | Facility: HOSPITAL | Age: 50
End: 2020-08-26
Attending: PHYSICAL MEDICINE & REHABILITATION
Payer: MEDICARE

## 2020-08-26 VITALS
OXYGEN SATURATION: 97 % | HEIGHT: 64 IN | BODY MASS INDEX: 50.02 KG/M2 | WEIGHT: 293 LBS | DIASTOLIC BLOOD PRESSURE: 82 MMHG | SYSTOLIC BLOOD PRESSURE: 110 MMHG | RESPIRATION RATE: 18 BRPM | HEART RATE: 86 BPM | TEMPERATURE: 99 F

## 2020-08-26 DIAGNOSIS — M79.604 PAIN IN BOTH LOWER EXTREMITIES: ICD-10-CM

## 2020-08-26 DIAGNOSIS — F32.A DEPRESSION, UNSPECIFIED DEPRESSION TYPE: ICD-10-CM

## 2020-08-26 DIAGNOSIS — M79.605 PAIN IN BOTH LOWER EXTREMITIES: ICD-10-CM

## 2020-08-26 DIAGNOSIS — R29.898 DECREASED STRENGTH OF LOWER EXTREMITY: ICD-10-CM

## 2020-08-26 DIAGNOSIS — Z00.00 ENCOUNTER FOR PREVENTIVE HEALTH EXAMINATION: Primary | ICD-10-CM

## 2020-08-26 DIAGNOSIS — Z72.0 TOBACCO ABUSE: ICD-10-CM

## 2020-08-26 PROCEDURE — G0439 PR MEDICARE ANNUAL WELLNESS SUBSEQUENT VISIT: ICD-10-PCS | Mod: HCNC,S$GLB,, | Performed by: NURSE PRACTITIONER

## 2020-08-26 PROCEDURE — 3074F PR MOST RECENT SYSTOLIC BLOOD PRESSURE < 130 MM HG: ICD-10-PCS | Mod: HCNC,CPTII,S$GLB, | Performed by: NURSE PRACTITIONER

## 2020-08-26 PROCEDURE — 3074F SYST BP LT 130 MM HG: CPT | Mod: HCNC,CPTII,S$GLB, | Performed by: NURSE PRACTITIONER

## 2020-08-26 PROCEDURE — 99999 PR PBB SHADOW E&M-EST. PATIENT-LVL V: ICD-10-PCS | Mod: PBBFAC,HCNC,, | Performed by: NURSE PRACTITIONER

## 2020-08-26 PROCEDURE — 3079F PR MOST RECENT DIASTOLIC BLOOD PRESSURE 80-89 MM HG: ICD-10-PCS | Mod: HCNC,CPTII,S$GLB, | Performed by: NURSE PRACTITIONER

## 2020-08-26 PROCEDURE — 99999 PR PBB SHADOW E&M-EST. PATIENT-LVL V: CPT | Mod: PBBFAC,HCNC,, | Performed by: NURSE PRACTITIONER

## 2020-08-26 PROCEDURE — 97110 THERAPEUTIC EXERCISES: CPT | Mod: HCNC,PN

## 2020-08-26 PROCEDURE — G0439 PPPS, SUBSEQ VISIT: HCPCS | Mod: HCNC,S$GLB,, | Performed by: NURSE PRACTITIONER

## 2020-08-26 PROCEDURE — 3079F DIAST BP 80-89 MM HG: CPT | Mod: HCNC,CPTII,S$GLB, | Performed by: NURSE PRACTITIONER

## 2020-08-26 NOTE — PATIENT INSTRUCTIONS
Counseling and Referral of Other Preventative  (Italic type indicates deductible and co-insurance are waived)    Patient Name: Yanni Kaiser  Today's Date: 8/26/2020    Health Maintenance       Date Due Completion Date    Sign Pain Contract 08/04/1990 ---    Foot Exam 08/19/2020 8/19/2019    Override on 3/7/2018: Done    Influenza Vaccine (1) 09/01/2020 10/28/2019    Hemoglobin A1c 12/12/2020 6/12/2020    Override on 5/1/2015: Done    Eye Exam 05/20/2021 5/20/2020    Lipid Panel 06/12/2021 6/12/2020    Cervical Cancer Screening 07/11/2021 7/11/2018    Mammogram 08/05/2021 8/5/2020    Colonoscopy 01/14/2024 1/14/2019    TETANUS VACCINE 08/19/2029 8/19/2019        No orders of the defined types were placed in this encounter.    The following information is provided to all patients.  This information is to help you find resources for any of the problems found today that may be affecting your health:                Living healthy guide: www.[facility].[place].[facility]      Understanding Diabetes: www.diabetes.org      Eating healthy: www.cdc.gov/healthyweight      CDC home safety checklist: www.cdc.gov/steadi/patient.html      Agency on Aging: www.goea.[place].gov      Alcoholics anonymous (AA): www.aa.org      Physical Activity: www.janis.nih.gov/eb5ollb      Tobacco use: www.quitwithusla.org

## 2020-08-26 NOTE — PROGRESS NOTES
"  Yanni Kaiser presented for a  Medicare AWV and comprehensive Health Risk Assessment today. The following components were reviewed and updated:    · Medical history  · Family History  · Social history  · Allergies and Current Medications  · Health Risk Assessment  · Health Maintenance  · Care Team     ** See Completed Assessments for Annual Wellness Visit within the encounter summary.**         The following assessments were completed:  · Living Situation  · CAGE  · Depression Screening  · Timed Get Up and Go  · Whisper Test  · Cognitive Function Screening  · Nutrition Screening  · ADL Screening  · PAQ Screening        Vitals:    08/26/20 0955   BP: 110/82   BP Location: Right arm   Patient Position: Sitting   BP Method: Large (Manual)   Pulse: 86   Resp: 18   Temp: 98.9 °F (37.2 °C)   TempSrc: Oral   SpO2: 97%   Weight: (!) 148 kg (326 lb 4.5 oz)   Height: 5' 4" (1.626 m)     Body mass index is 56.01 kg/m².  Physical Exam  Vitals signs reviewed.   Constitutional:       General: She is not in acute distress.     Appearance: Normal appearance. She is obese. She is not ill-appearing, toxic-appearing or diaphoretic.   Cardiovascular:      Rate and Rhythm: Normal rate and regular rhythm.      Pulses: Normal pulses.      Heart sounds: Normal heart sounds.   Pulmonary:      Effort: Pulmonary effort is normal.      Breath sounds: Normal breath sounds.   Skin:     General: Skin is warm and dry.   Neurological:      Mental Status: She is alert.   Psychiatric:         Mood and Affect: Mood normal.         Behavior: Behavior normal.                   Diagnoses and health risks identified today and associated recommendations/orders:    1. Encounter for preventive health examination  The patient was seen today for annual Medicare wellness exam.  Health maintenance and screening topics were discussed.  Proper diet and exercise recommendations reviewed.    2. Tobacco abuse  Counseled patient on dangers of cigarette smoking.  " Patient is amenable to seeing smoking cessation counselor again.  Will write referral  - Ambulatory referral/consult to Smoking Cessation Program; Future    3. Pain in both lower extremities  Patient reports significant pain in her bilateral legs.  She reports intermittent claudication on the left leg and some numbness and tingling on bilateral legs intermittently.  Given that patient is a smoker will check ABIs  - US Ankle Brachial Indices Ext LTD WO Str; Future    4. Depression, unspecified depression type  Patient with significant depressive symptoms. PHQ score 18. She does take Cymbalta daily.  She does state that in the last couple of weeks she has had thoughts that she would be better off dead, however she denies having any plan for a suicide attempt and states I have been down that road before and I am not going back.  She is amenable to seeing psychiatry will write this referral.  She was given the number for Psychiatry and was told that she needed to call to make this appointment herself.  She was recommended to go the emergency room if she develops any worsening suicidal thoughts  - Ambulatory referral/consult to Psychiatry; Future      Provided Efraintrice with a 5-10 year written screening schedule and personal prevention plan. Recommendations were developed using the USPSTF age appropriate recommendations. Education, counseling, and referrals were provided as needed. After Visit Summary printed and given to patient which includes a list of additional screenings\tests needed.    Follow up in about 1 year (around 8/26/2021) for AWV.    Noe Prado, ARPITA    I offered to discuss end of life issues, including information on how to make advance directives that the patient could use to name someone who would make medical decisions on their behalf if they became too ill to make themselves.    ___Patient declined  _X_Patient is interested, I provided paper work and offered to discuss.

## 2020-08-26 NOTE — PROGRESS NOTES
"                                                    Physical Therapy Daily Note     Date: 08/26/2020  Name: Yanni Kaiser  Clinic Number: [id number]  Diagnosis:   Encounter Diagnosis   Name Primary?    Decreased strength of lower extremity      Physician: Carlyle Gordillo MD    Physician: Migdalia Patel, *     Physician Orders: PT Eval and Treat   Medical Diagnosis from Referral: Chronic midline low back pain with bilateral sciatica  Evaluation Date: 8/6/2020  Authorization Period Expiration: 9/6/20  Plan of Care Expiration: 2/21/21  Visit # / Visits authorized: 4/21    Time In: 1145  Time Out: 1231  Total Billable Time: 46 minutes     Precautions: Fibromyalgia, Chronic Bronchitis, T2DM with neurologic complications, Seizures      Subjective     Pt reports 8.5/10 low back pain and pain down the back of both legs. Pt reports she took a couple fluid pills so relieve the pressure from her head so she is feeling better overall but still hurting. She felt like her muscles were throbbing as the fluid was coming off. Reports compliance with HEP.    Objective       Patient received individual therapy to increase strength, endurance, ROM, flexibility, posture and core stabilization with activities as follows:     Yanni received therapeutic exercises to develop strength, endurance, ROM, flexibility, posture and core stabilization for 41 minutes including:     Nustep x5 min  Calf st x1 min knees bent and straight  Standing plantar fascia st x30" ea  Ball rollouts 10"x3 ea direction  DKTC with SB x20 NP  LTR 20x no SB  Pelvic tilt 10x5"  HL adduction squeeze 20x5"   PPT with knee fall out 2x1'  Bridges with lat pull down, GTB 3x10  Clams 3x10  piriformis st 3x30" with towel  sciatic nerve glides x20 with towel  HS Str c/ strap 2x30" ea NP     Yanni received the following manual therapy techniques for 5 minutes.     STM with stick B lumbar paraspinals and gluteal    Written Home Exercises Provided: no new " exercises provided this session  Pt demo good understanding of the education provided. Yanni demonstrated good return demonstration of activities.     Education provided: importance of HEP  Lyndseye verbalized good understanding of education provided.   No spiritual or educational barriers to learning identified.    Assessment     Pt tolerated tx well. Progressed core exercises to improve stability, requiring max verbal cues for pelvic stability during BKFO. Pt continues to present with muscle tightness and hypersensitivity to palpation L low back and buttocks, but R side not as sensitive today during manual therapy. Pt with chronic pain and fibromyalgia likely slowing progress toward goals. Pt will continue to benefit from skilled PT in order to decrease pain, increase strength/ROM, and improve functional mobility.    Short Term GOALS: 6 weeks. Pt agrees with goals set.  1. Patient demonstrates independence with HEP.   2. Patient demonstrates independence with Postural Awareness.   3. Patient demonstrates independence with body mechanics.   4. Patient will report pain of 7/10 at worst, on 0-10 pain scale, with all activity  5. Patient demonstrates increased thoracolumbar ROM by 5 degrees in all planes to improve tolerance to functional activities pain free.   6. Patient demonstrates increased strength BLE's by 1/3 muscle grade or greater to improve tolerance to functional activities pain free.     Long Term GOALS: 12 weeks. Pt agrees with goals set.  1. Patient demonstrates increased thoracolumbar flexion AROM by 15 degrees or greater to improve tolerance to functional activities pain free.   2. Patient demonstrates increased strength BLE's to 4/5 or greater to improve tolerance to functional activities pain free.   3. Patient demonstrates improved overall function per FOTO Lumbar Survey to 50% Limitation or less.   4. Patient will report pain of 5/10 at worst, on 0-10 pain scale, with all activity  5. Patient  demonstrates ability to walk 2 blocks, appropriate gait pattern, no pain provocation     Plan   Continue with established Plan of Care towards PT goals.      Therapist: Hermila Steiner, PT, DPT

## 2020-09-10 ENCOUNTER — TELEPHONE (OUTPATIENT)
Dept: PHARMACY | Facility: CLINIC | Age: 50
End: 2020-09-10

## 2020-09-11 ENCOUNTER — HOSPITAL ENCOUNTER (OUTPATIENT)
Facility: OTHER | Age: 50
Discharge: HOME | End: 2020-09-11
Attending: ANESTHESIOLOGY | Admitting: ANESTHESIOLOGY
Payer: MEDICARE

## 2020-09-11 VITALS
TEMPERATURE: 99 F | SYSTOLIC BLOOD PRESSURE: 155 MMHG | HEIGHT: 64 IN | HEART RATE: 52 BPM | WEIGHT: 293 LBS | DIASTOLIC BLOOD PRESSURE: 85 MMHG | OXYGEN SATURATION: 99 % | BODY MASS INDEX: 50.02 KG/M2 | RESPIRATION RATE: 16 BRPM

## 2020-09-11 DIAGNOSIS — G89.29 CHRONIC PAIN: ICD-10-CM

## 2020-09-11 DIAGNOSIS — M54.16 LUMBAR RADICULOPATHY: Primary | ICD-10-CM

## 2020-09-11 LAB — POCT GLUCOSE: 82 MG/DL (ref 70–110)

## 2020-09-11 PROCEDURE — 62323 NJX INTERLAMINAR LMBR/SAC: CPT | Mod: HCNC,,, | Performed by: ANESTHESIOLOGY

## 2020-09-11 PROCEDURE — 62323 NJX INTERLAMINAR LMBR/SAC: CPT | Mod: HCNC | Performed by: ANESTHESIOLOGY

## 2020-09-11 PROCEDURE — 62323 PR INJ LUMBAR/SACRAL, W/IMAGING GUIDANCE: ICD-10-PCS | Mod: HCNC,,, | Performed by: ANESTHESIOLOGY

## 2020-09-11 PROCEDURE — 82947 ASSAY GLUCOSE BLOOD QUANT: CPT | Mod: HCNC | Performed by: ANESTHESIOLOGY

## 2020-09-11 PROCEDURE — 63600175 PHARM REV CODE 636 W HCPCS: Mod: HCNC | Performed by: ANESTHESIOLOGY

## 2020-09-11 PROCEDURE — 25500020 PHARM REV CODE 255: Mod: HCNC | Performed by: ANESTHESIOLOGY

## 2020-09-11 PROCEDURE — 25000003 PHARM REV CODE 250: Mod: HCNC | Performed by: ANESTHESIOLOGY

## 2020-09-11 RX ORDER — SODIUM CHLORIDE 9 MG/ML
500 INJECTION, SOLUTION INTRAVENOUS CONTINUOUS
Status: DISCONTINUED | OUTPATIENT
Start: 2020-09-11 | End: 2020-09-11 | Stop reason: HOSPADM

## 2020-09-11 RX ORDER — MIDAZOLAM HYDROCHLORIDE 1 MG/ML
INJECTION INTRAMUSCULAR; INTRAVENOUS
Status: DISCONTINUED | OUTPATIENT
Start: 2020-09-11 | End: 2020-09-11 | Stop reason: HOSPADM

## 2020-09-11 RX ORDER — LIDOCAINE HYDROCHLORIDE 5 MG/ML
INJECTION, SOLUTION INFILTRATION; INTRAVENOUS
Status: DISCONTINUED | OUTPATIENT
Start: 2020-09-11 | End: 2020-09-11 | Stop reason: HOSPADM

## 2020-09-11 RX ORDER — DEXAMETHASONE SODIUM PHOSPHATE 4 MG/ML
INJECTION, SOLUTION INTRA-ARTICULAR; INTRALESIONAL; INTRAMUSCULAR; INTRAVENOUS; SOFT TISSUE
Status: DISCONTINUED | OUTPATIENT
Start: 2020-09-11 | End: 2020-09-11 | Stop reason: HOSPADM

## 2020-09-11 RX ORDER — LIDOCAINE HYDROCHLORIDE 10 MG/ML
INJECTION INFILTRATION; PERINEURAL
Status: DISCONTINUED | OUTPATIENT
Start: 2020-09-11 | End: 2020-09-11 | Stop reason: HOSPADM

## 2020-09-11 RX ORDER — FENTANYL CITRATE 50 UG/ML
INJECTION, SOLUTION INTRAMUSCULAR; INTRAVENOUS
Status: DISCONTINUED | OUTPATIENT
Start: 2020-09-11 | End: 2020-09-11 | Stop reason: HOSPADM

## 2020-09-11 RX ADMIN — SODIUM CHLORIDE 500 ML: 0.9 INJECTION, SOLUTION INTRAVENOUS at 10:09

## 2020-09-11 NOTE — DISCHARGE SUMMARY
Discharge Note  Short Stay      SUMMARY     Admit Date: 9/11/2020    Attending Physician: Lawrence Marin      Discharge Physician: Reddy Clements      Discharge Date: 9/11/2020 11:06 AM    Procedure(s) (LRB):  INJECTION, STEROID, EPIDURAL, L5-S1 IL (N/A)    Final Diagnosis: Lumbar radiculopathy [M54.16]    Disposition: Home or self care    Patient Instructions:   Current Discharge Medication List      CONTINUE these medications which have NOT CHANGED    Details   albuterol (PROVENTIL/VENTOLIN HFA) 90 mcg/actuation inhaler Inhale 2 puffs into the lungs every 6 (six) hours as needed. Rescue  Qty: 54 g, Refills: 0    Comments: **Patient requests 90 days supply**  Associated Diagnoses: Severe persistent asthma, unspecified whether complicated      albuterol-ipratropium (DUO-NEB) 2.5 mg-0.5 mg/3 mL nebulizer solution Take 3 mLs by nebulization every 6 (six) hours as needed for Wheezing or Shortness of Breath. Rescue  Qty: 100 mL, Refills: 3    Associated Diagnoses: Moderate persistent asthma with exacerbation      atorvastatin (LIPITOR) 40 MG tablet Take 1 tablet (40 mg total) by mouth once daily.  Qty: 90 tablet, Refills: 3    Associated Diagnoses: Mixed hyperlipidemia      baclofen (LIORESAL) 10 MG tablet Take 1 tablet (10 mg total) by mouth 3 (three) times daily.  Qty: 90 tablet, Refills: 0    Associated Diagnoses: Myalgia      blood sugar diagnostic Strp 1 each by Misc.(Non-Drug; Combo Route) route 4 (four) times daily before meals and nightly. ACCU CHEK ELVIA PLUS METER  Qty: 200 each, Refills: 3      blood-glucose meter (ACCU-CHEK ELVIA PLUS METER) Misc TEST FOUR TIMES DAILY BEFORE MEALS AND EVERY NIGHT  Qty: 90 each, Refills: 1    Associated Diagnoses: Type 2 diabetes mellitus with stage 3 chronic kidney disease, with long-term current use of insulin      budesonide-formoterol 160-4.5 mcg (SYMBICORT) 160-4.5 mcg/actuation HFAA Inhale 2 puffs into the lungs every 12 (twelve) hours. Controller  Qty: 1 Inhaler,  Refills: 5    Associated Diagnoses: Severe persistent asthma, unspecified whether complicated      desloratadine (CLARINEX) 5 mg tablet Take 1 tablet (5 mg total) by mouth once daily.  Qty: 30 tablet, Refills: 11    Associated Diagnoses: Acute bacterial sinusitis      DULoxetine (CYMBALTA) 30 MG capsule Take 1 capsule (30 mg total) by mouth once daily.  Qty: 30 capsule, Refills: 3    Associated Diagnoses: Intractable chronic migraine without aura and without status migrainosus; Other chronic pain      erenumab-aooe (AIMOVIG AUTOINJECTOR) 140 mg/mL autoinjector Inject 140 mg into the skin every 28 days.  Qty: 1 mL, Refills: 11    Associated Diagnoses: Intractable chronic migraine without aura and without status migrainosus      lancets (ACCU-CHEK SOFTCLIX LANCETS) Misc 1 Device by Misc.(Non-Drug; Combo Route) route 4 (four) times daily.  Qty: 200 each, Refills: 3      levocetirizine (XYZAL) 5 MG tablet       lidocaine (LIDODERM) 5 % Place 1 patch onto the skin once daily. Remove & Discard patch within 12 hours or as directed by MD  Qty: 15 patch, Refills: 0      losartan (COZAAR) 100 MG tablet TAKE 1 TABLET(100 MG) BY MOUTH EVERY DAY  Qty: 90 tablet, Refills: 0    Associated Diagnoses: Benign essential HTN      ondansetron (ZOFRAN) 8 MG tablet Take 1 tablet (8 mg total) by mouth every 8 (eight) hours as needed for Nausea.  Qty: 90 tablet, Refills: 5    Associated Diagnoses: Nausea and vomiting, intractability of vomiting not specified, unspecified vomiting type      oxyCODONE-acetaminophen (PERCOCET)  mg per tablet Take 1 tablet by mouth every 12 (twelve) hours as needed for Pain.  Qty: 40 tablet, Refills: 0    Comments: Quantity prescribed more than 7 day supply? Yes, quantity medically necessary      pantoprazole (PROTONIX) 40 MG tablet Take 40 mg by mouth once daily.   Refills: 3      QUEtiapine (SEROQUEL) 25 MG Tab Take 1 tablet (25 mg total) by mouth once daily.  Qty: 90 tablet, Refills: 0    Associated  Diagnoses: Severe episode of recurrent major depressive disorder, with psychotic features      semaglutide (OZEMPIC) 1 mg/dose (2 mg/1.5 mL) PnIj Inject 1 mg subq weekly.  Qty: 9 mL, Refills: 1    Associated Diagnoses: Type 2 diabetes mellitus with stage 3 chronic kidney disease, with long-term current use of insulin      sumatriptan (IMITREX) 50 MG tablet Take 1 tab at onset of headaches.  If no improvement in 2 hours, take another.  Do not take more than 2 tabs in 24 hours.  Qty: 9 tablet, Refills: 5    Associated Diagnoses: Chronic nonintractable headache, unspecified headache type                 Discharge Diagnosis: Lumbar radiculopathy [M54.16]  Condition on Discharge: Stable with no complications to procedure   Diet on Discharge: Same as before.  Activity: as per instruction sheet.  Discharge to: Home with a responsible adult.  Follow up: 2-4 weeks

## 2020-09-11 NOTE — DISCHARGE INSTRUCTIONS
Thank you for allowing us to care for you today. You may receive a survey about the care we provided. Your feedback is valuable and helps us provide excellent care throughout the community.     Home Care Instructions for Pain Management:    1. DIET:   You may resume your normal diet today.   2. BATHING:   You may shower with luke warm water. No tub baths or anything that will soak injection sites under water for the next 24 hours.  3. DRESSING:   You may remove your bandage today.   4. ACTIVITY LEVEL:   You may resume your normal activities 24 hrs after your procedure. Nothing strenuous today.  5. MEDICATIONS:   You may resume your normal medications today. To restart blood thinners, ask your doctor.  6. DRIVING    If you have received any sedatives by mouth today, you may not drive for 12 hours.    If you have received any sedation through your IV, you may not drive for 24 hrs.   7. SPECIAL INSTRUCTIONS:   No heat to the injection site for 24 hrs including, hot bath or shower, heating pad, moist heat, or hot tubs.    Use ice pack to injection site for any pain or discomfort.  Apply ice packs for 20 minute intervals as needed.    IF you have diabetes, be sure to monitor your blood sugar more closely. IF your injection contained steroids your blood sugar levels may become higher than normal.    If you are still having pain upon discharge:  Your pain may improve over the next 48 hours. The anesthetic (numbing medication) works immediately to 48 hours. IF your injection contained a steroid (anti-inflammatory medication), it takes approximately 3 days to start feeling relief and 7-10 days to see your greatest results from the medication. It is possible you may need subsequent injections. This would be discussed at your follow up appointment with pain management or your referring doctor.    Please call the PAIN MANAGEMENT office at [phone] or ON CALL pager at [phone] if you experienced any:   -Weakness or  loss of sensation  -Fever > 101.5  -Pain uncontrolled with oral medications   -Persistent nausea, vomiting, or diarrhea  -Redness or drainage from the injection sites, or any other worrisome concerns.   If physician on call was not reached or could not communicate with our office for any reason please go to the nearest emergency department.  Adult Procedural Sedation Instructions    Recovery After Procedural Sedation (Adult)  You have been given medicine by vein to make you sleep during your surgery. This may have included both a pain medicine and sleeping medicine. Most of the effects have worn off. But you may still have some drowsiness for the next 6 to 8 hours.  Home care  Follow these guidelines when you get home:  · For the next 8 hours, you should be watched by a responsible adult. This person should make sure your condition is not getting worse.  · Don't drink any alcohol for the next 24 hours.  · Don't drive, operate dangerous machinery, or make important business or personal decisions during the next 24 hours.  Note: Your healthcare provider may tell you not to take any medicine by mouth for pain or sleep in the next 4 hours. These medicines may react with the medicines you were given in the hospital. This could cause a much stronger response than usual.  Follow-up care  Follow up with your healthcare provider if you are not alert and back to your usual level of activity within 12 hours.  When to seek medical advice  Call your healthcare provider right away if any of these occur:  · Drowsiness gets worse  · Weakness or dizziness gets worse  · Repeated vomiting  · You can't be awakened   Date Last Reviewed: 10/18/2016  © 7589-1624 [employer]. [address] [place] [address], [place] [address]. All rights reserved. This information is not intended as a substitute for professional medical care. Always follow your healthcare professional's instructions.

## 2020-09-11 NOTE — OP NOTE
"Patient Name: Yanni Kaiser  MRN: [id number]    INFORMED CONSENT: The procedure, risks, benefits and options were discussed with patient. There are no contraindications to the procedure. The patient expressed understanding and agreed to proceed. The personnel performing the procedure was discussed. I verify that I personally obtained Yanni's consent prior to the start of the procedure and the signed consent can be found on the patient's chart.    Procedure Date: 09/11/2020    Anesthesia: Topical    Pre Procedure diagnosis: Lumbar radiculopathy [M54.16]  1. Lumbar radiculopathy    2. Chronic pain      Post-Procedure diagnosis: SAME      Moderate Sedation: Yes - Fentanyl 50 mcg and Midazolam 2 mg  Sedation time: Less than 15 minutes  Conscious sedation ordered by MD. Patient reevaluated and sedation administered by MD and monitored by RN.      PROCEDURE: L5/S1 INTERLAMINAR EPIDURAL STEROID INJECTION - LUMBAR      DESCRIPTION OF PROCEDURE: The patient was brought to the procedure room.  After performing time out IV access was obtained prior to the procedure. The patient was positioned prone on the fluoroscopy table. Continuous hemodynamic monitoring was initiated including blood pressure, EKG, and pulse oximetry.   The area of the spine was prepped with chlorhexidine three times and draped in a sterile fashion.  Skin anesthesia was achieved using 3 mL of lidocaine 1% over the respective injection site. The L4/5 interspace was visualized under fluoroscopic imaging. Attempts to access the epidural space at this level were unsuccessful. The L5/S1 interspace was then visualized under fluoroscopic imaging. An 20 gauge 4 1/2" Tuohy needle was slowly inserted and advanced using loss of resistance to saline with AP and lateral fluoroscopic imaging for needle guidance. Negative aspiration for blood or CSF was confirmed. Epidural contrast spread was confirmed using 2mL of Omnipaque 300 contrast spreading in the lumbar " epidural space.6 mL of Lidocaine 0.5% and 10 mg decadron was injected. The needle was removed and bleeding was nil.  A sterile dressing was applied. Yanni was taken back to the recovery room for further observation.     Blood Loss: Nill  Specimen: None      Reddy Clements MD     I have reviewed and concur with the resident's history, physical, assessment, and plan.  I have personally interviewed and examined the patient at bedside.  See below addendum for my evaluation and additional findings.      Lawrence Marin MD

## 2020-09-14 ENCOUNTER — CLINICAL SUPPORT (OUTPATIENT)
Dept: REHABILITATION | Facility: HOSPITAL | Age: 50
End: 2020-09-14
Attending: ANESTHESIOLOGY
Payer: MEDICARE

## 2020-09-14 DIAGNOSIS — R29.898 DECREASED STRENGTH OF LOWER EXTREMITY: ICD-10-CM

## 2020-09-14 DIAGNOSIS — M54.50 CHRONIC BILATERAL LOW BACK PAIN WITHOUT SCIATICA: ICD-10-CM

## 2020-09-14 DIAGNOSIS — G89.29 CHRONIC BILATERAL LOW BACK PAIN WITHOUT SCIATICA: ICD-10-CM

## 2020-09-14 PROCEDURE — 97110 THERAPEUTIC EXERCISES: CPT | Mod: HCNC,PN

## 2020-09-14 NOTE — PROGRESS NOTES
"                                                    Physical Therapy Daily Note     Date: 09/14/2020  Name: Yanni Kaiser  Clinic Number: [id number]  Diagnosis:   Encounter Diagnoses   Name Primary?    Decreased strength of lower extremity     Chronic bilateral low back pain without sciatica      Physician: Carlyle Gordillo MD    Physician: Migdalia Patel, *     Physician Orders: PT Eval and Treat   Medical Diagnosis from Referral: Chronic midline low back pain with bilateral sciatica  Evaluation Date: 8/6/2020  Authorization Period Expiration: 9/6/20  Plan of Care Expiration: 2/21/21  Visit # / Visits authorized: 5/21    Time In: 1610  Time Out: 1704  Total Billable Time: 54 minutes     Precautions: Fibromyalgia, Chronic Bronchitis, T2DM with neurologic complications, Seizures      Subjective     Pt reports 7/10 low back pain with nothing down the legs pre-tx. Reports she got an injection in her low back Friday and that helped some but also fluid has been coming off her body naturally and that helps.      Objective     Patient received individual therapy to increase strength, endurance, ROM, flexibility, posture and core stabilization with activities as follows:     Yanni received therapeutic exercises to develop strength, endurance, ROM, flexibility, posture and core stabilization for 54 minutes including:     Nustep x5 min level 2  Calf st x1 min knees bent and straight NP  Standing plantar fascia st x30" ea NP  Ball rollouts 10"x3 ea direction  pallof press GTB x20 B  DKTC with SB x20 NP  LTR 20x no SB  Pelvic tilt 20x5"  HL adduction squeeze 20x5"   PPT with knee fall out 2x1'  PPT with marches 2x1'  Bridges with lat pull down, GTB 3x10  Clams 3x10   piriformis st 3x30" with towel  sciatic nerve glides x20 with towel  HS Str c/ strap 2x30" ea NP     Yanni received the following manual therapy techniques for 00 minutes.     STM with stick B lumbar paraspinals and gluteal    Written Home " "Exercises Provided: no new exercises provided this session  Pt demo good understanding of the education provided. Yanni demonstrated good return demonstration of activities.     Education provided: importance of HEP; fibromyalgia  Yanni verbalized good understanding of education provided.   No spiritual or educational barriers to learning identified.    Assessment     Pt tolerated tx well. Progressed core exercises with no adverse reaction; however, patient requires max verbal cues and demonstration to stabilize pelvis and activate TA. Patient has difficulty with body awareness and noticing which muscles are active. During clamshells, she felt "pain" in hamstrings area. Pt educated in course of fibromyalgia and how to work on desensitization and body awareness. Pt with improved radicular symptoms since an injection last week, however, reported pain levels still high. Pt will continue to benefit from skilled PT in order to decrease pain, increase strength/ROM, and improve functional mobility.    Short Term GOALS: 6 weeks. Pt agrees with goals set.  1. Patient demonstrates independence with HEP.   2. Patient demonstrates independence with Postural Awareness.   3. Patient demonstrates independence with body mechanics.   4. Patient will report pain of 7/10 at worst, on 0-10 pain scale, with all activity  5. Patient demonstrates increased thoracolumbar ROM by 5 degrees in all planes to improve tolerance to functional activities pain free.   6. Patient demonstrates increased strength BLE's by 1/3 muscle grade or greater to improve tolerance to functional activities pain free.     Long Term GOALS: 12 weeks. Pt agrees with goals set.  1. Patient demonstrates increased thoracolumbar flexion AROM by 15 degrees or greater to improve tolerance to functional activities pain free.   2. Patient demonstrates increased strength BLE's to 4/5 or greater to improve tolerance to functional activities pain free.   3. Patient " demonstrates improved overall function per FOTO Lumbar Survey to 50% Limitation or less.   4. Patient will report pain of 5/10 at worst, on 0-10 pain scale, with all activity  5. Patient demonstrates ability to walk 2 blocks, appropriate gait pattern, no pain provocation     Plan   Continue with established Plan of Care towards PT goals.      Therapist: Hermila Steiner, PT, DPT

## 2020-09-15 ENCOUNTER — PATIENT OUTREACH (OUTPATIENT)
Dept: ADMINISTRATIVE | Facility: OTHER | Age: 50
End: 2020-09-15

## 2020-09-15 NOTE — PROGRESS NOTES
Health Maintenance Due   Topic Date Due    Sign Pain Contract  08/04/1988    Influenza Vaccine (1) 08/01/2020     Updates were requested from care everywhere.  Chart was reviewed for overdue Proactive [facility] Encounters ([facility]) topics (CRS, Breast Cancer Screening, Eye exam)  Health Maintenance has been updated.  LINKS immunization registry triggered.  Immunizations were reconciled.

## 2020-09-16 ENCOUNTER — OFFICE VISIT (OUTPATIENT)
Dept: SLEEP MEDICINE | Facility: CLINIC | Age: 50
End: 2020-09-16
Payer: MEDICARE

## 2020-09-16 VITALS
SYSTOLIC BLOOD PRESSURE: 128 MMHG | DIASTOLIC BLOOD PRESSURE: 82 MMHG | WEIGHT: 293 LBS | BODY MASS INDEX: 50.02 KG/M2 | HEIGHT: 64 IN | HEART RATE: 76 BPM

## 2020-09-16 DIAGNOSIS — E66.01 MORBID OBESITY WITH BMI OF 50.0-59.9, ADULT: ICD-10-CM

## 2020-09-16 DIAGNOSIS — G93.2 IDIOPATHIC INTRACRANIAL HYPERTENSION: Chronic | ICD-10-CM

## 2020-09-16 DIAGNOSIS — G93.2 INCREASED INTRACRANIAL PRESSURE: Primary | ICD-10-CM

## 2020-09-16 DIAGNOSIS — E11.22 TYPE 2 DIABETES MELLITUS WITH STAGE 3 CHRONIC KIDNEY DISEASE, WITH LONG-TERM CURRENT USE OF INSULIN: ICD-10-CM

## 2020-09-16 DIAGNOSIS — N18.30 TYPE 2 DIABETES MELLITUS WITH STAGE 3 CHRONIC KIDNEY DISEASE, WITH LONG-TERM CURRENT USE OF INSULIN: ICD-10-CM

## 2020-09-16 DIAGNOSIS — Z79.4 TYPE 2 DIABETES MELLITUS WITH STAGE 3 CHRONIC KIDNEY DISEASE, WITH LONG-TERM CURRENT USE OF INSULIN: ICD-10-CM

## 2020-09-16 DIAGNOSIS — H47.10 UNSPECIFIED PAPILLEDEMA: ICD-10-CM

## 2020-09-16 DIAGNOSIS — G47.33 OSA (OBSTRUCTIVE SLEEP APNEA): ICD-10-CM

## 2020-09-16 PROCEDURE — 99214 OFFICE O/P EST MOD 30 MIN: CPT | Mod: HCNC,S$GLB,, | Performed by: NURSE PRACTITIONER

## 2020-09-16 PROCEDURE — 3008F PR BODY MASS INDEX (BMI) DOCUMENTED: ICD-10-PCS | Mod: HCNC,CPTII,S$GLB, | Performed by: NURSE PRACTITIONER

## 2020-09-16 PROCEDURE — 3079F PR MOST RECENT DIASTOLIC BLOOD PRESSURE 80-89 MM HG: ICD-10-PCS | Mod: HCNC,CPTII,S$GLB, | Performed by: NURSE PRACTITIONER

## 2020-09-16 PROCEDURE — 99999 PR PBB SHADOW E&M-EST. PATIENT-LVL IV: ICD-10-PCS | Mod: PBBFAC,HCNC,, | Performed by: NURSE PRACTITIONER

## 2020-09-16 PROCEDURE — 99214 PR OFFICE/OUTPT VISIT, EST, LEVL IV, 30-39 MIN: ICD-10-PCS | Mod: HCNC,S$GLB,, | Performed by: NURSE PRACTITIONER

## 2020-09-16 PROCEDURE — 3074F PR MOST RECENT SYSTOLIC BLOOD PRESSURE < 130 MM HG: ICD-10-PCS | Mod: HCNC,CPTII,S$GLB, | Performed by: NURSE PRACTITIONER

## 2020-09-16 PROCEDURE — 99999 PR PBB SHADOW E&M-EST. PATIENT-LVL IV: CPT | Mod: PBBFAC,HCNC,, | Performed by: NURSE PRACTITIONER

## 2020-09-16 PROCEDURE — 3008F BODY MASS INDEX DOCD: CPT | Mod: HCNC,CPTII,S$GLB, | Performed by: NURSE PRACTITIONER

## 2020-09-16 PROCEDURE — 3079F DIAST BP 80-89 MM HG: CPT | Mod: HCNC,CPTII,S$GLB, | Performed by: NURSE PRACTITIONER

## 2020-09-16 PROCEDURE — 3074F SYST BP LT 130 MM HG: CPT | Mod: HCNC,CPTII,S$GLB, | Performed by: NURSE PRACTITIONER

## 2020-09-16 RX ORDER — TOPIRAMATE 100 MG/1
300 TABLET, FILM COATED ORAL 2 TIMES DAILY
COMMUNITY
End: 2020-09-22

## 2020-09-16 NOTE — PROGRESS NOTES
"CC: F/u mgt of SHARATH/headaches    Since seen got setup with apap but reports choking sensations with mask on. Sleep was better overall but still some disruptions. Using nose pillow mask +chin strap. Didn't know to bring machine today.       HST revealing AHI 6(RDI 16)\  Continues to have left sided, retroorbital pressure/pain / Imitrex + rest helps, HA today but not yet taken med. Seeing new neuro next month. Continues to take topamax 300mg bid and diamox. Weraing mask during prior sleep study 2015 caused asthma attack. Still daily headache.     ROS: In addition to above. Otherwise a balance review of 10-systems is negative.       PHYSICAL EXAM:   General: W/D, morbid obese, well groomed  /82 (BP Location: Right arm, Patient Position: Sitting, BP Method: Medium (Automatic))   Pulse 76   Ht 5' 4" (1.626 m)   Wt (!) 149.1 kg (328 lb 11.3 oz)   BMI 56.42 kg/m²   Neurological: Awake, alert, oriented x 3. Speech fluent, no dysarthria. Good attention span. Good fund of knowledge.     HgBA1c 5.2    IMPRESSION:   Chronic migraines w/o aura  Idiopathiic intracranial hypertension on topamax and diamox  Hx seizures  SHARATH, mild-moderate. Adherent with pap reports some benefit from use, but having choking sensations affecting use  HTN, morbid obesity, DM    PLAN   Access ENCORe data tomorrow for PAP compliance  Continue aimovig 140mg SC q28d until sees neuro  Continue Imitrex 50-100mg 1/2-1 tab PO q2h prn, max 200mg/24h   Keep machine and attempt resuming  Use APAP 5-14cm , consider F30 mask future. THS DME  rtc prn  Alternative SHARATH treatments discussed ie: OA  Encouraged continued weight loss efforts for potential improvement of SHARATH and overall health benefits  See PCP re: DMHTN mgt/continue meds    REfER to ophthalmology assess papilledema  "

## 2020-09-22 ENCOUNTER — OFFICE VISIT (OUTPATIENT)
Dept: NEUROLOGY | Facility: CLINIC | Age: 50
End: 2020-09-22
Payer: MEDICARE

## 2020-09-22 ENCOUNTER — TELEPHONE (OUTPATIENT)
Dept: PHARMACY | Facility: CLINIC | Age: 50
End: 2020-09-22

## 2020-09-22 VITALS — WEIGHT: 293 LBS | HEIGHT: 64 IN | BODY MASS INDEX: 50.02 KG/M2

## 2020-09-22 DIAGNOSIS — G43.719 INTRACTABLE CHRONIC MIGRAINE WITHOUT AURA AND WITHOUT STATUS MIGRAINOSUS: Primary | ICD-10-CM

## 2020-09-22 PROCEDURE — 3008F PR BODY MASS INDEX (BMI) DOCUMENTED: ICD-10-PCS | Mod: HCNC,CPTII,S$GLB, | Performed by: NEUROLOGICAL SURGERY

## 2020-09-22 PROCEDURE — 99999 PR PBB SHADOW E&M-EST. PATIENT-LVL IV: CPT | Mod: PBBFAC,HCNC,, | Performed by: NEUROLOGICAL SURGERY

## 2020-09-22 PROCEDURE — 99215 PR OFFICE/OUTPT VISIT, EST, LEVL V, 40-54 MIN: ICD-10-PCS | Mod: HCNC,S$GLB,, | Performed by: NEUROLOGICAL SURGERY

## 2020-09-22 PROCEDURE — 99999 PR PBB SHADOW E&M-EST. PATIENT-LVL IV: ICD-10-PCS | Mod: PBBFAC,HCNC,, | Performed by: NEUROLOGICAL SURGERY

## 2020-09-22 PROCEDURE — 99215 OFFICE O/P EST HI 40 MIN: CPT | Mod: HCNC,S$GLB,, | Performed by: NEUROLOGICAL SURGERY

## 2020-09-22 PROCEDURE — 3008F BODY MASS INDEX DOCD: CPT | Mod: HCNC,CPTII,S$GLB, | Performed by: NEUROLOGICAL SURGERY

## 2020-09-22 RX ORDER — FREMANEZUMAB-VFRM 225 MG/1.5ML
225 INJECTION SUBCUTANEOUS
Qty: 1.5 ML | Refills: 5 | Status: SHIPPED | OUTPATIENT
Start: 2020-09-22 | End: 2020-10-05 | Stop reason: CLARIF

## 2020-09-22 RX ORDER — TOPIRAMATE 200 MG/1
600 CAPSULE, EXTENDED RELEASE ORAL DAILY
Qty: 90 CAPSULE | Refills: 1 | Status: SHIPPED | OUTPATIENT
Start: 2020-09-22 | End: 2020-10-27 | Stop reason: SDUPTHER

## 2020-09-24 ENCOUNTER — TELEPHONE (OUTPATIENT)
Dept: PAIN MEDICINE | Facility: CLINIC | Age: 50
End: 2020-09-24

## 2020-09-24 NOTE — TELEPHONE ENCOUNTER
Staff called to confirm 9/28/20 3 pm in office visit with Laverne Taylor Np. Patient informed of instructions via voice message.

## 2020-09-28 ENCOUNTER — TELEPHONE (OUTPATIENT)
Dept: PAIN MEDICINE | Facility: CLINIC | Age: 50
End: 2020-09-28

## 2020-09-28 NOTE — TELEPHONE ENCOUNTER
Staff spoke with patient regarding request to switch appointment to virtual it has been switched to 9/29/20 at 12:40pm as my chart virtual video with dain Roberts and patient was informed to ramonita in 15 minutes before visit also tech support number in case any issues occur patient verbalized understanding.

## 2020-09-29 ENCOUNTER — PATIENT MESSAGE (OUTPATIENT)
Dept: OTHER | Facility: OTHER | Age: 50
End: 2020-09-29

## 2020-09-29 ENCOUNTER — OFFICE VISIT (OUTPATIENT)
Dept: PAIN MEDICINE | Facility: CLINIC | Age: 50
End: 2020-09-29
Payer: MEDICARE

## 2020-09-29 DIAGNOSIS — M79.7 FIBROMYALGIA: ICD-10-CM

## 2020-09-29 DIAGNOSIS — M54.16 LUMBAR RADICULOPATHY: Primary | ICD-10-CM

## 2020-09-29 DIAGNOSIS — Z78.9 DECREASED ACTIVITIES OF DAILY LIVING (ADL): ICD-10-CM

## 2020-09-29 DIAGNOSIS — G89.29 OTHER CHRONIC PAIN: ICD-10-CM

## 2020-09-29 DIAGNOSIS — M48.061 SPINAL STENOSIS OF LUMBAR REGION, UNSPECIFIED WHETHER NEUROGENIC CLAUDICATION PRESENT: ICD-10-CM

## 2020-09-29 PROCEDURE — 99213 OFFICE O/P EST LOW 20 MIN: CPT | Mod: HCNC,95,, | Performed by: NURSE PRACTITIONER

## 2020-09-29 PROCEDURE — 99213 PR OFFICE/OUTPT VISIT, EST, LEVL III, 20-29 MIN: ICD-10-PCS | Mod: HCNC,95,, | Performed by: NURSE PRACTITIONER

## 2020-09-29 NOTE — H&P (VIEW-ONLY)
Chronic Pain-Tele-Medicine-Established Note (Follow up visit)        The patient location is: Home  The chief complaint leading to consultation is: pain  Visit type: Virtual visit with synchronous audio and video  Total time spent with patient: 8 min  Each patient to whom he or she provides medical services by telemedicine is:  (1) informed of the relationship between the physician and patient and the respective role of any other health care provider with respect to management of the patient; and (2) notified that he or she may decline to receive medical services by telemedicine and may withdraw from such care at any time.    Referring Physician: No ref. provider found    Chief Complaint:   No chief complaint on file.           SUBJECTIVE:    Interval History 9/29/2020:  The patient is a virtual appointment today for follow-up of back and leg pain.  She is now status post L4/5 interlaminar epidural steroid injection.  She also reports limited benefit from this procedure.  Her pain is mainly located across the lower back with intermittent radiation into the legs.  Her back pain is currently her greatest complaint.  It bothers her the most when she stands for a long time in changes positions.  She is still in physical therapy but states that she missed her appointment today due to her nephew being ill.  Her pain today is 10/10.    Interval History 8/25/2020:  The patient is here today for follow-up of chronic lower back pain.  The pain radiates down the posterolateral aspect of both legs to her shin.  She had limited benefit with bilateral L4/5 TF NAKITA in the past.  We previously discussed IL NAKITA which she would like to schedule.  Since her previous encounter, she has started physical therapy which she thinks is helpful.  She has increased her home activity regimen as previously discussed.  Her pain today is 9/10    Interval History 7/14/2020:  The patient presents for follow up of lower back and leg pain.  She is s/p  bilateral L4/5 TF NAKITA on 6/24/20 with limited benefit of leg pain.  She denies any respiratory changes such as fever, cough, or shortness of breath.  She continues to report pain that starts across the lower back with radiation down the side of both legs.  She has associated numbness and tingling to both legs.  Her pain worsens when she walks and when she is active.  She says that she had to PT appointments did not have follow-up appointments made.  The previous note indicates that she is to continue with PT she is seeing Snow Collazo for chronic pain as occasion.  She says that she has been taking Lyrica at night that is making her wake up in the middle of the night with dizziness.  She stops it as instructed by PCP.  She has an appointment with her neurologist tomorrow.  Her pain today is 10/10.    Previous Encounter:  Yanni Kaiser presents to the clinic for the evaluation of lower back pain. The pain started 3 years ago following no specific incident and symptoms have been worsening.The pain is located in the lumbar area and radiates to the left leg mostly but also into the right leg. Non-specific dermatomal distribution.  The pain is described as aching, sharp and shooting and is rated as 10/10. The pain is rated with a score of  10/10 on the BEST day and a score of 10/10 on the WORST day.  Symptoms interfere with daily activity and sleeping. The pain is exacerbated by Sitting, Walking, Lifting and Getting out of bed/chair.  The pain is mitigated by heat, ice, medications and physical therapy. She reports spending 4 hours per day reclining. The patient reports 0 hours of uninterrupted sleep per night.        Physical Therapy/Home Exercise: yes currently    Pain Disability Index Review:  Last 3 PDI Scores 8/25/2020 7/14/2020 6/9/2020   Pain Disability Index (PDI) 62 [phone]       Pain Medications:    - Opioids: Percocet (Oxycodone/Acetaminophen)  - Adjuvant Medications: Advil,Motrin ( Ibuprofen)    Lidoderm patch, without benefit  Voltaren gel, without benefit  Gabapentin, side effects  Lamotrigine     report:  Reviewed and consistent with medication use as prescribed.    Pain Procedures:   6/4/20 Bilateral L4/5 TF NAKITA- no relief    Imaging:   Narrative     EXAMINATION:  MRI LUMBAR SPINE WITHOUT CONTRAST    CLINICAL HISTORY:  spinal stenosis; Lumbago with sciatica, right side    TECHNIQUE:  Multiplanar, multisequence MR images were acquired from the thoracolumbar junction to the sacrum without contrast.    COMPARISON:  MRI lumbar spine 08/28/2018    FINDINGS:  Straightening of the normal lumbar lordosis.  Mild grade 1 retrolisthesis L5 on S1    Vertebral body heights are maintained.  Several T1 and T2 hyperintense lesions within the L2 and L3 vertebral body favored to represent osseous hemangiomas.  Otherwise normal marrow signal.  No fracture.    Multilevel partial disc desiccation throughout the lumbar spine.  Posterior annular fissuring at L2-L3.    Distal spinal cord is unremarkable.  Conus terminates at L1-L2.    Degenerative findings:    T12-L1: No disc herniation, spinal canal stenosis, or neural foraminal narrowing.    L1-L2: No disc herniation, spinal canal stenosis, or neural foraminal narrowing.    L2-L3: No disc herniation, spinal canal stenosis or neural foraminal narrowing.    L3-L4: Mild circumferential disc bulge without spinal canal stenosis or neural foraminal narrowing.    L4-L5: Circumferential disc bulge, facet arthropathy, and ligamentum flavum hypertrophy resulting in moderate spinal canal stenosis and moderate bilateral neural foraminal narrowing.    L5-S1: Circumferential disc bulge and facet arthropathy without spinal canal stenosis or neural foraminal narrowing.    Paraspinal muscles and soft tissues are unremarkable.      Impression       Multilevel lumbar spondylosis most prominent at L4-5 resulting in moderate spinal canal stenosis and moderate bilateral neural foraminal  narrowing.  Findings have slightly progressed in comparison to prior study dated 2018.    Electronically signed by resident: Fady Reese  Date: 2020  Time: 08:27    Electronically signed by: Abelardo Gold MD  Date: 2020  Time: 08:57         Past Medical History:   Diagnosis Date    Allergy     Anemia     Asthma     Back pain     Chronic bronchitis     Cigarette smoker     DDD (degenerative disc disease), lumbar 2020    Depression     Diabetes with neurologic complications     DUB (dysfunctional uterine bleeding) 10/16/2018    GERD (gastroesophageal reflux disease)     High cholesterol     Hyperprolactinemia     Hypertension     Influenza A 2020    Obese body habitus     Obesity     Pseudotumor cerebri     Renal manifestation of secondary diabetes mellitus     Respiratory failure     Seizures     Simple endometrial hyperplasia     Sleep apnea     Smoker     Tobacco dependence     Urinary incontinence      Past Surgical History:   Procedure Laterality Date    BREAST CYST ASPIRATION       SECTION      X 1    CHOLECYSTECTOMY      COLONOSCOPY      COLONOSCOPY N/A 2019    Procedure: COLONOSCOPY;  Surgeon: Jagruti Sweeney MD;  Location: [facility] ([address] [place] [address]);  Service: Endoscopy;  Laterality: N/A;  BMI is 63/gastroparesis/3 days full liquid diet 1 day clear liquid see telephone encounter by Ciara Brown     EPIDURAL STEROID INJECTION N/A 2020    Procedure: INJECTION, STEROID, EPIDURAL, L5-S1 IL;  Surgeon: Lawrence Marin MD;  Location: [facility];  Service: Pain Management;  Laterality: N/A;    ESOPHAGEAL MANOMETRY WITH MEASUREMENT OF IMPEDANCE N/A 2019    Procedure: MANOMETRY-ESOPHAGEAL-WITH IMPEDANCE;  Surgeon: Jagruti Sweeney MD;  [facility]  Service: Endoscopy;  Laterality: N/A;  Hold Narcotics x 1 days   Hold TCA x 1 days  Propofol only. No fentanyl or benzodiazepine during sedation. If additional  sedation needed, discuss with Dr. Sweeney.  10/29 - pt confirmed appt    ESOPHAGOGASTRODUODENOSCOPY N/A 1/14/2019    Procedure: EGD (ESOPHAGOGASTRODUODENOSCOPY);  Surgeon: Jagruti Sweeney MD;  Location: [facility]);  Service: Endoscopy;  Laterality: N/A;  BMI is 63/gastroparesis/3 days full liquid diet 1 day clear liquid see telephone encounter by Ciara Brown     ESOPHAGOGASTRODUODENOSCOPY N/A 11/5/2019    Procedure: ESOPHAGOGASTRODUODENOSCOPY (EGD);  Surgeon: Jagruti Sweeney MD;  Location: [facility] (2ND FLR);  Service: Endoscopy;  Laterality: N/A;  EGD with EndoFlip /+/- Botox  2nd floor for gastroparesis/BMI 63 **367lbs**  Bariatric Stretcher needed  Manometry probe to be placed endoscopically during EGD procedure  Due to change in protocol, Full liquid diet for 3 days/ 1 day of clear liquids  Hi    FOOT FRACTURE SURGERY Left     HYSTEROSCOPY WITH DILATION AND CURETTAGE OF UTERUS N/A 10/16/2018    [facility]    PLANTAR FASCIOTOMY Left 11/18/2019    Procedure: FASCIOTOMY, PLANTAR;  Surgeon: Valentino Orourke DPM;  [facility];  Service: Podiatry;  Laterality: Left;    RETINAL LASER PROCEDURE Left     SINUS SURGERY      TRANSFORAMINAL EPIDURAL INJECTION OF STEROID Bilateral 6/24/2020    Procedure: INJECTION, STEROID, EPIDURAL, TRANSFORAMINAL APPROACH;  Surgeon: Lawrence Marin MD;  Location: [facility];  Service: Pain Management;  Laterality: Bilateral;    UPPER GASTROINTESTINAL ENDOSCOPY       Social History     Socioeconomic History    Marital status:      Spouse name: Not on file    Number of children: Not on file    Years of education: Not on file    Highest education level: Not on file   Occupational History    Not on file   Social Needs    Financial resource strain: Very hard    Food insecurity     Worry: Often true     Inability: Often true    Transportation needs     Medical: No     Non-medical: No   Tobacco Use    Smoking status: Current Every Day Smoker      Packs/day: 1.00     Years: 27.00     Pack years: 27.00     Types: Cigarettes     Start date: 1/1/1992    Smokeless tobacco: Never Used    Tobacco comment: 1/2 a pack if her days are good   Substance and Sexual Activity    Alcohol use: Not Currently     Alcohol/week: 0.0 standard drinks     Frequency: Never     Comment: socially    Drug use: No    Sexual activity: Yes     Partners: Male     Birth control/protection: None     Comment:    Lifestyle    Physical activity     Days per week: Not on file     Minutes per session: Not on file    Stress: Not on file   Relationships    Social connections     Talks on phone: More than three times a week     Gets together: Three times a week     Attends Religion service: More than 4 times per year     Active member of club or organization: Yes     Attends meetings of clubs or organizations: More than 4 times per year     Relationship status:    Other Topics Concern    Not on file   Social History Narrative    Not on file     Family History   Problem Relation Age of Onset    Hypertension Mother     Diabetes Mother     Colon cancer Mother 50    Colon polyps Mother     Heart disease Father     COPD Father     Heart attack Father     Hypertension Father     Ulcers Father     Cancer Maternal Grandmother     Breast cancer Maternal Grandmother     Colon cancer Maternal Grandmother     Colon polyps Maternal Grandmother     No Known Problems Paternal Grandmother     No Known Problems Brother     No Known Problems Maternal Aunt     No Known Problems Maternal Uncle     No Known Problems Paternal Aunt     No Known Problems Paternal Uncle     No Known Problems Maternal Grandfather     No Known Problems Paternal Grandfather     Celiac disease Neg Hx     Cirrhosis Neg Hx     Crohn's disease Neg Hx     Cystic fibrosis Neg Hx     Esophageal cancer Neg Hx     Hemochromatosis Neg Hx     Inflammatory bowel disease Neg Hx     Irritable bowel  syndrome Neg Hx     Liver cancer Neg Hx     Liver disease Neg Hx     Rectal cancer Neg Hx     Stomach cancer Neg Hx     Ulcerative colitis Neg Hx     Jonnie's disease Neg Hx     Tuberculosis Neg Hx     Lymphoma Neg Hx     Scleroderma Neg Hx     Rheum arthritis Neg Hx     Melanoma Neg Hx     Multiple sclerosis Neg Hx     Psoriasis Neg Hx     Lupus Neg Hx     Skin cancer Neg Hx     Amblyopia Neg Hx     Blindness Neg Hx     Cataracts Neg Hx     Glaucoma Neg Hx     Macular degeneration Neg Hx     Retinal detachment Neg Hx     Strabismus Neg Hx     Stroke Neg Hx     Thyroid disease Neg Hx        Review of patient's allergies indicates:   Allergen Reactions    Gabapentin      Makes her twitch       Current Outpatient Medications   Medication Sig    albuterol (PROVENTIL/VENTOLIN HFA) 90 mcg/actuation inhaler Inhale 2 puffs into the lungs every 6 (six) hours as needed. Rescue    albuterol-ipratropium (DUO-NEB) 2.5 mg-0.5 mg/3 mL nebulizer solution Take 3 mLs by nebulization every 6 (six) hours as needed for Wheezing or Shortness of Breath. Rescue    atorvastatin (LIPITOR) 40 MG tablet Take 1 tablet (40 mg total) by mouth once daily.    baclofen (LIORESAL) 10 MG tablet TAKE 1 TABLET(10 MG) BY MOUTH THREE TIMES DAILY    blood sugar diagnostic Strp 1 each by Misc.(Non-Drug; Combo Route) route 4 (four) times daily before meals and nightly. ACCU CHEK LEVIA PLUS METER    blood-glucose meter (ACCU-CHEK ELVIA PLUS METER) Misc TEST FOUR TIMES DAILY BEFORE MEALS AND EVERY NIGHT    budesonide-formoterol 160-4.5 mcg (SYMBICORT) 160-4.5 mcg/actuation HFAA Inhale 2 puffs into the lungs every 12 (twelve) hours. Controller    desloratadine (CLARINEX) 5 mg tablet Take 1 tablet (5 mg total) by mouth once daily.    DULoxetine (CYMBALTA) 30 MG capsule Take 1 capsule (30 mg total) by mouth once daily.    erenumab-aooe (AIMOVIG AUTOINJECTOR) 140 mg/mL autoinjector Inject 140 mg into the skin every 28 days.     fremanezumab-vfrm (AJOVY AUTOINJECTOR) 225 mg/1.5 mL autoinjector Inject 1.5 mLs (225 mg total) into the skin every 28 days.    lancets (ACCU-CHEK SOFTCLIX LANCETS) Misc 1 Device by Misc.(Non-Drug; Combo Route) route 4 (four) times daily.    levocetirizine (XYZAL) 5 MG tablet     lidocaine (LIDODERM) 5 % Place 1 patch onto the skin once daily. Remove & Discard patch within 12 hours or as directed by MD    losartan (COZAAR) 100 MG tablet TAKE 1 TABLET(100 MG) BY MOUTH EVERY DAY    ondansetron (ZOFRAN) 8 MG tablet Take 1 tablet (8 mg total) by mouth every 8 (eight) hours as needed for Nausea.    oxyCODONE-acetaminophen (PERCOCET)  mg per tablet Take 1 tablet by mouth every 12 (twelve) hours as needed for Pain.    pantoprazole (PROTONIX) 40 MG tablet Take 40 mg by mouth once daily.     QUEtiapine (SEROQUEL) 25 MG Tab Take 1 tablet (25 mg total) by mouth once daily.    semaglutide (OZEMPIC) 1 mg/dose (2 mg/1.5 mL) PnIj Inject 1 mg subq weekly.    sumatriptan (IMITREX) 50 MG tablet Take 1 tab at onset of headaches.  If no improvement in 2 hours, take another.  Do not take more than 2 tabs in 24 hours.    topiramate (TROKENDI XR) 200 mg Cp24 Take 600 mg by mouth once daily.     No current facility-administered medications for this visit.        REVIEW OF SYSTEMS:    GENERAL:  No weight loss, malaise or fevers.  HEENT:  + headaches  NECK:  Negative for lumps, goiter, pain and significant neck swelling.  RESPIRATORY:  Negative for cough, wheezing or shortness of breath. + SHARATH  CARDIOVASCULAR:  Negative for chest pain, leg swelling or palpitations.  GI:  Negative for abdominal discomfort, blood in stools or black stools or change in bowel habits.+ GERD  MUSCULOSKELETAL:  See HPI.  SKIN:  Negative for lesions, rash, and itching.  PSYCH:  + sleep disturbance and mood disorders  HEMATOLOGY/LYMPHOLOGY:  Negative for prolonged bleeding, bruising easily or swollen nodes.  NEURO:   No h/o syncope, paralysis. + h/o  of seizures  All other reviewed and negative other than HPI.    OBJECTIVE:    PREVIOUS PHYSICAL EXAMINATION:    General appearance: Well appearing, in no acute distress, alert and oriented x3.  Psych:  Mood and affect appropriate.  Skin: Skin color, texture, turgor normal, no rashes or lesions, in both upper and lower body.  Head/face:  Normocephalic, atraumatic. No palpable lymph nodes.  GI:  Soft and non-tender.  Back: There is TTP over facet joint and paraspinal muscles bilaterally.  Limited ROM with pain on flexion and extension.  Positive facet loading bilaterally, R>L.  SLR is positive at right L5 distribution.  Extremities: Peripheral joint ROM is full and pain free without obvious instability or laxity in all four extremities. No deformities, edema, or skin discoloration. Good capillary refill.  Musculoskeletal:   No atrophy or tone abnormalities are noted.  Neuro: Decreased sensation to BLE at L4 and L5 distributions.  Gait: Antalgic- ambulates without assistance.    ASSESSMENT: 50 y.o. year old female with low back and leg pain, consistent with lumbar radiculopathy     1. Lumbar radiculopathy     2. Decreased activities of daily living (ADL)     3. Spinal stenosis of lumbar region, unspecified whether neurogenic claudication present     4. Fibromyalgia     5. Other chronic pain           PLAN:     - I have stressed the importance of physical activity and a home exercise plan to help with pain and improve health.  - She is s/p L4/5 IL NAKITA with limited benefit.   - Schedule for bilateral L3,4,5 MBB.  If significant benefit with repeat.  If again significant benefit will schedule for RFAs.  - Continue with PT.   - RTC after completion of procedures.  - Counseled patient regarding the importance of smoking cessation and relationship with pain.    The above plan and management options were discussed at length with patient. Patient is in agreement with the above and verbalized understanding.   Laverne DUNN  Claudia  09/29/2020

## 2020-09-29 NOTE — PROGRESS NOTES
Chronic Pain-Tele-Medicine-Established Note (Follow up visit)        The patient location is: Home  The chief complaint leading to consultation is: pain  Visit type: Virtual visit with synchronous audio and video  Total time spent with patient: 8 min  Each patient to whom he or she provides medical services by telemedicine is:  (1) informed of the relationship between the physician and patient and the respective role of any other health care provider with respect to management of the patient; and (2) notified that he or she may decline to receive medical services by telemedicine and may withdraw from such care at any time.    Referring Physician: No ref. provider found    Chief Complaint:   No chief complaint on file.           SUBJECTIVE:    Interval History 9/29/2020:  The patient is a virtual appointment today for follow-up of back and leg pain.  She is now status post L4/5 interlaminar epidural steroid injection.  She also reports limited benefit from this procedure.  Her pain is mainly located across the lower back with intermittent radiation into the legs.  Her back pain is currently her greatest complaint.  It bothers her the most when she stands for a long time in changes positions.  She is still in physical therapy but states that she missed her appointment today due to her nephew being ill.  Her pain today is 10/10.    Interval History 8/25/2020:  The patient is here today for follow-up of chronic lower back pain.  The pain radiates down the posterolateral aspect of both legs to her shin.  She had limited benefit with bilateral L4/5 TF NAKITA in the past.  We previously discussed IL NAKITA which she would like to schedule.  Since her previous encounter, she has started physical therapy which she thinks is helpful.  She has increased her home activity regimen as previously discussed.  Her pain today is 9/10    Interval History 7/14/2020:  The patient presents for follow up of lower back and leg pain.  She is s/p  bilateral L4/5 TF NAKITA on 6/24/20 with limited benefit of leg pain.  She denies any respiratory changes such as fever, cough, or shortness of breath.  She continues to report pain that starts across the lower back with radiation down the side of both legs.  She has associated numbness and tingling to both legs.  Her pain worsens when she walks and when she is active.  She says that she had to PT appointments did not have follow-up appointments made.  The previous note indicates that she is to continue with PT she is seeing Snow Collazo for chronic pain as occasion.  She says that she has been taking Lyrica at night that is making her wake up in the middle of the night with dizziness.  She stops it as instructed by PCP.  She has an appointment with her neurologist tomorrow.  Her pain today is 10/10.    Previous Encounter:  Yanni Kaiser presents to the clinic for the evaluation of lower back pain. The pain started 3 years ago following no specific incident and symptoms have been worsening.The pain is located in the lumbar area and radiates to the left leg mostly but also into the right leg. Non-specific dermatomal distribution.  The pain is described as aching, sharp and shooting and is rated as 10/10. The pain is rated with a score of  10/10 on the BEST day and a score of 10/10 on the WORST day.  Symptoms interfere with daily activity and sleeping. The pain is exacerbated by Sitting, Walking, Lifting and Getting out of bed/chair.  The pain is mitigated by heat, ice, medications and physical therapy. She reports spending 4 hours per day reclining. The patient reports 0 hours of uninterrupted sleep per night.        Physical Therapy/Home Exercise: yes currently    Pain Disability Index Review:  Last 3 PDI Scores 8/25/2020 7/14/2020 6/9/2020   Pain Disability Index (PDI) 62 [phone]       Pain Medications:    - Opioids: Percocet (Oxycodone/Acetaminophen)  - Adjuvant Medications: Advil,Motrin ( Ibuprofen)    Lidoderm patch, without benefit  Voltaren gel, without benefit  Gabapentin, side effects  Lamotrigine     report:  Reviewed and consistent with medication use as prescribed.    Pain Procedures:   6/4/20 Bilateral L4/5 TF NAKITA- no relief    Imaging:   Narrative     EXAMINATION:  MRI LUMBAR SPINE WITHOUT CONTRAST    CLINICAL HISTORY:  spinal stenosis; Lumbago with sciatica, right side    TECHNIQUE:  Multiplanar, multisequence MR images were acquired from the thoracolumbar junction to the sacrum without contrast.    COMPARISON:  MRI lumbar spine 08/28/2018    FINDINGS:  Straightening of the normal lumbar lordosis.  Mild grade 1 retrolisthesis L5 on S1    Vertebral body heights are maintained.  Several T1 and T2 hyperintense lesions within the L2 and L3 vertebral body favored to represent osseous hemangiomas.  Otherwise normal marrow signal.  No fracture.    Multilevel partial disc desiccation throughout the lumbar spine.  Posterior annular fissuring at L2-L3.    Distal spinal cord is unremarkable.  Conus terminates at L1-L2.    Degenerative findings:    T12-L1: No disc herniation, spinal canal stenosis, or neural foraminal narrowing.    L1-L2: No disc herniation, spinal canal stenosis, or neural foraminal narrowing.    L2-L3: No disc herniation, spinal canal stenosis or neural foraminal narrowing.    L3-L4: Mild circumferential disc bulge without spinal canal stenosis or neural foraminal narrowing.    L4-L5: Circumferential disc bulge, facet arthropathy, and ligamentum flavum hypertrophy resulting in moderate spinal canal stenosis and moderate bilateral neural foraminal narrowing.    L5-S1: Circumferential disc bulge and facet arthropathy without spinal canal stenosis or neural foraminal narrowing.    Paraspinal muscles and soft tissues are unremarkable.      Impression       Multilevel lumbar spondylosis most prominent at L4-5 resulting in moderate spinal canal stenosis and moderate bilateral neural foraminal  narrowing.  Findings have slightly progressed in comparison to prior study dated 2018.    Electronically signed by resident: Fady Reese  Date: 2020  Time: 08:27    Electronically signed by: Abelardo Gold MD  Date: 2020  Time: 08:57         Past Medical History:   Diagnosis Date    Allergy     Anemia     Asthma     Back pain     Chronic bronchitis     Cigarette smoker     DDD (degenerative disc disease), lumbar 2020    Depression     Diabetes with neurologic complications     DUB (dysfunctional uterine bleeding) 10/16/2018    GERD (gastroesophageal reflux disease)     High cholesterol     Hyperprolactinemia     Hypertension     Influenza A 2020    Obese body habitus     Obesity     Pseudotumor cerebri     Renal manifestation of secondary diabetes mellitus     Respiratory failure     Seizures     Simple endometrial hyperplasia     Sleep apnea     Smoker     Tobacco dependence     Urinary incontinence      Past Surgical History:   Procedure Laterality Date    BREAST CYST ASPIRATION       SECTION      X 1    CHOLECYSTECTOMY      COLONOSCOPY      COLONOSCOPY N/A 2019    Procedure: COLONOSCOPY;  Surgeon: Jagruti Sweeney MD;  Location: [facility] ([address] [place] [address]);  Service: Endoscopy;  Laterality: N/A;  BMI is 63/gastroparesis/3 days full liquid diet 1 day clear liquid see telephone encounter by Ciara Brown     EPIDURAL STEROID INJECTION N/A 2020    Procedure: INJECTION, STEROID, EPIDURAL, L5-S1 IL;  Surgeon: Lawrence Marin MD;  Location: [facility];  Service: Pain Management;  Laterality: N/A;    ESOPHAGEAL MANOMETRY WITH MEASUREMENT OF IMPEDANCE N/A 2019    Procedure: MANOMETRY-ESOPHAGEAL-WITH IMPEDANCE;  Surgeon: Jagruti Sweeney MD;  [facility]  Service: Endoscopy;  Laterality: N/A;  Hold Narcotics x 1 days   Hold TCA x 1 days  Propofol only. No fentanyl or benzodiazepine during sedation. If additional  sedation needed, discuss with Dr. Sweeney.  10/29 - pt confirmed appt    ESOPHAGOGASTRODUODENOSCOPY N/A 1/14/2019    Procedure: EGD (ESOPHAGOGASTRODUODENOSCOPY);  Surgeon: Jagruti Sweeney MD;  [facility]  Service: Endoscopy;  Laterality: N/A;  BMI is 63/gastroparesis/3 days full liquid diet 1 day clear liquid see telephone encounter by Ciara Brown     ESOPHAGOGASTRODUODENOSCOPY N/A 11/5/2019    Procedure: ESOPHAGOGASTRODUODENOSCOPY (EGD);  Surgeon: Jagruti Sweeney MD;  Location: [facility] (2ND FLR);  Service: Endoscopy;  Laterality: N/A;  EGD with EndoFlip /+/- Botox  2nd floor for gastroparesis/BMI 63 **367lbs**  Bariatric Stretcher needed  Manometry probe to be placed endoscopically during EGD procedure  Due to change in protocol, Full liquid diet for 3 days/ 1 day of clear liquids  Hi    FOOT FRACTURE SURGERY Left     HYSTEROSCOPY WITH DILATION AND CURETTAGE OF UTERUS N/A 10/16/2018    [facility]    PLANTAR FASCIOTOMY Left 11/18/2019    Procedure: FASCIOTOMY, PLANTAR;  Surgeon: Valentino Orourke DPM;  [facility];  Service: Podiatry;  Laterality: Left;    RETINAL LASER PROCEDURE Left     SINUS SURGERY      TRANSFORAMINAL EPIDURAL INJECTION OF STEROID Bilateral 6/24/2020    Procedure: INJECTION, STEROID, EPIDURAL, TRANSFORAMINAL APPROACH;  Surgeon: Lawrence Marin MD;  Location[facility];  Service: Pain Management;  Laterality: Bilateral;    UPPER GASTROINTESTINAL ENDOSCOPY       Social History     Socioeconomic History    Marital status:      Spouse name: Not on file    Number of children: Not on file    Years of education: Not on file    Highest education level: Not on file   Occupational History    Not on file   Social Needs    Financial resource strain: Very hard    Food insecurity     Worry: Often true     Inability: Often true    Transportation needs     Medical: No     Non-medical: No   Tobacco Use    Smoking status: Current Every Day Smoker      Packs/day: 1.00     Years: 27.00     Pack years: 27.00     Types: Cigarettes     Start date: 1/1/1992    Smokeless tobacco: Never Used    Tobacco comment: 1/2 a pack if her days are good   Substance and Sexual Activity    Alcohol use: Not Currently     Alcohol/week: 0.0 standard drinks     Frequency: Never     Comment: socially    Drug use: No    Sexual activity: Yes     Partners: Male     Birth control/protection: None     Comment:    Lifestyle    Physical activity     Days per week: Not on file     Minutes per session: Not on file    Stress: Not on file   Relationships    Social connections     Talks on phone: More than three times a week     Gets together: Three times a week     Attends [employer] service: More than 4 times per year     Active member of club or organization: Yes     Attends meetings of clubs or organizations: More than 4 times per year     Relationship status:    Other Topics Concern    Not on file   Social History Narrative    Not on file     Family History   Problem Relation Age of Onset    Hypertension Mother     Diabetes Mother     Colon cancer Mother 50    Colon polyps Mother     Heart disease Father     COPD Father     Heart attack Father     Hypertension Father     Ulcers Father     Cancer Maternal Grandmother     Breast cancer Maternal Grandmother     Colon cancer Maternal Grandmother     Colon polyps Maternal Grandmother     No Known Problems Paternal Grandmother     No Known Problems Brother     No Known Problems Maternal Aunt     No Known Problems Maternal Uncle     No Known Problems Paternal Aunt     No Known Problems Paternal Uncle     No Known Problems Maternal Grandfather     No Known Problems Paternal Grandfather     Celiac disease Neg Hx     Cirrhosis Neg Hx     Crohn's disease Neg Hx     Cystic fibrosis Neg Hx     Esophageal cancer Neg Hx     Hemochromatosis Neg Hx     Inflammatory bowel disease Neg Hx     Irritable bowel  syndrome Neg Hx     Liver cancer Neg Hx     Liver disease Neg Hx     Rectal cancer Neg Hx     Stomach cancer Neg Hx     Ulcerative colitis Neg Hx     Jonnie's disease Neg Hx     Tuberculosis Neg Hx     Lymphoma Neg Hx     Scleroderma Neg Hx     Rheum arthritis Neg Hx     Melanoma Neg Hx     Multiple sclerosis Neg Hx     Psoriasis Neg Hx     Lupus Neg Hx     Skin cancer Neg Hx     Amblyopia Neg Hx     Blindness Neg Hx     Cataracts Neg Hx     Glaucoma Neg Hx     Macular degeneration Neg Hx     Retinal detachment Neg Hx     Strabismus Neg Hx     Stroke Neg Hx     Thyroid disease Neg Hx        Review of patient's allergies indicates:   Allergen Reactions    Gabapentin      Makes her twitch       Current Outpatient Medications   Medication Sig    albuterol (PROVENTIL/VENTOLIN HFA) 90 mcg/actuation inhaler Inhale 2 puffs into the lungs every 6 (six) hours as needed. Rescue    albuterol-ipratropium (DUO-NEB) 2.5 mg-0.5 mg/3 mL nebulizer solution Take 3 mLs by nebulization every 6 (six) hours as needed for Wheezing or Shortness of Breath. Rescue    atorvastatin (LIPITOR) 40 MG tablet Take 1 tablet (40 mg total) by mouth once daily.    baclofen (LIORESAL) 10 MG tablet TAKE 1 TABLET(10 MG) BY MOUTH THREE TIMES DAILY    blood sugar diagnostic Strp 1 each by Misc.(Non-Drug; Combo Route) route 4 (four) times daily before meals and nightly. ACCU CHEK ELVIA PLUS METER    blood-glucose meter (ACCU-CHEK ELVIA PLUS METER) Misc TEST FOUR TIMES DAILY BEFORE MEALS AND EVERY NIGHT    budesonide-formoterol 160-4.5 mcg (SYMBICORT) 160-4.5 mcg/actuation HFAA Inhale 2 puffs into the lungs every 12 (twelve) hours. Controller    desloratadine (CLARINEX) 5 mg tablet Take 1 tablet (5 mg total) by mouth once daily.    DULoxetine (CYMBALTA) 30 MG capsule Take 1 capsule (30 mg total) by mouth once daily.    erenumab-aooe (AIMOVIG AUTOINJECTOR) 140 mg/mL autoinjector Inject 140 mg into the skin every 28 days.     fremanezumab-vfrm (AJOVY AUTOINJECTOR) 225 mg/1.5 mL autoinjector Inject 1.5 mLs (225 mg total) into the skin every 28 days.    lancets (ACCU-CHEK SOFTCLIX LANCETS) Misc 1 Device by Misc.(Non-Drug; Combo Route) route 4 (four) times daily.    levocetirizine (XYZAL) 5 MG tablet     lidocaine (LIDODERM) 5 % Place 1 patch onto the skin once daily. Remove & Discard patch within 12 hours or as directed by MD    losartan (COZAAR) 100 MG tablet TAKE 1 TABLET(100 MG) BY MOUTH EVERY DAY    ondansetron (ZOFRAN) 8 MG tablet Take 1 tablet (8 mg total) by mouth every 8 (eight) hours as needed for Nausea.    oxyCODONE-acetaminophen (PERCOCET)  mg per tablet Take 1 tablet by mouth every 12 (twelve) hours as needed for Pain.    pantoprazole (PROTONIX) 40 MG tablet Take 40 mg by mouth once daily.     QUEtiapine (SEROQUEL) 25 MG Tab Take 1 tablet (25 mg total) by mouth once daily.    semaglutide (OZEMPIC) 1 mg/dose (2 mg/1.5 mL) PnIj Inject 1 mg subq weekly.    sumatriptan (IMITREX) 50 MG tablet Take 1 tab at onset of headaches.  If no improvement in 2 hours, take another.  Do not take more than 2 tabs in 24 hours.    topiramate (TROKENDI XR) 200 mg Cp24 Take 600 mg by mouth once daily.     No current facility-administered medications for this visit.        REVIEW OF SYSTEMS:    GENERAL:  No weight loss, malaise or fevers.  HEENT:  + headaches  NECK:  Negative for lumps, goiter, pain and significant neck swelling.  RESPIRATORY:  Negative for cough, wheezing or shortness of breath. + SHARATH  CARDIOVASCULAR:  Negative for chest pain, leg swelling or palpitations.  GI:  Negative for abdominal discomfort, blood in stools or black stools or change in bowel habits.+ GERD  MUSCULOSKELETAL:  See HPI.  SKIN:  Negative for lesions, rash, and itching.  PSYCH:  + sleep disturbance and mood disorders  HEMATOLOGY/LYMPHOLOGY:  Negative for prolonged bleeding, bruising easily or swollen nodes.  NEURO:   No h/o syncope, paralysis. + h/o  of seizures  All other reviewed and negative other than HPI.    OBJECTIVE:    PREVIOUS PHYSICAL EXAMINATION:    General appearance: Well appearing, in no acute distress, alert and oriented x3.  Psych:  Mood and affect appropriate.  Skin: Skin color, texture, turgor normal, no rashes or lesions, in both upper and lower body.  Head/face:  Normocephalic, atraumatic. No palpable lymph nodes.  GI:  Soft and non-tender.  Back: There is TTP over facet joint and paraspinal muscles bilaterally.  Limited ROM with pain on flexion and extension.  Positive facet loading bilaterally, R>L.  SLR is positive at right L5 distribution.  Extremities: Peripheral joint ROM is full and pain free without obvious instability or laxity in all four extremities. No deformities, edema, or skin discoloration. Good capillary refill.  Musculoskeletal:   No atrophy or tone abnormalities are noted.  Neuro: Decreased sensation to BLE at L4 and L5 distributions.  Gait: Antalgic- ambulates without assistance.    ASSESSMENT: 50 y.o. year old female with low back and leg pain, consistent with lumbar radiculopathy     1. Lumbar radiculopathy     2. Decreased activities of daily living (ADL)     3. Spinal stenosis of lumbar region, unspecified whether neurogenic claudication present     4. Fibromyalgia     5. Other chronic pain           PLAN:     - I have stressed the importance of physical activity and a home exercise plan to help with pain and improve health.  - She is s/p L4/5 IL NAKITA with limited benefit.   - Schedule for bilateral L3,4,5 MBB.  If significant benefit with repeat.  If again significant benefit will schedule for RFAs.  - Continue with PT.   - RTC after completion of procedures.  - Counseled patient regarding the importance of smoking cessation and relationship with pain.    The above plan and management options were discussed at length with patient. Patient is in agreement with the above and verbalized understanding.   Laverne DUNN  Claudia  09/29/2020

## 2020-09-30 ENCOUNTER — TELEPHONE (OUTPATIENT)
Dept: PAIN MEDICINE | Facility: CLINIC | Age: 50
End: 2020-09-30

## 2020-09-30 NOTE — TELEPHONE ENCOUNTER
----- Message from KAMILA Crawford sent at 9/29/2020  1:02 PM CDT -----  Regarding: procedure  Please schedule for bilateral L3,4,5 MBB with Tomas.  No f/u after, can call with pain diary.

## 2020-10-01 DIAGNOSIS — G43.719 INTRACTABLE CHRONIC MIGRAINE WITHOUT AURA AND WITHOUT STATUS MIGRAINOSUS: Primary | ICD-10-CM

## 2020-10-02 ENCOUNTER — TELEPHONE (OUTPATIENT)
Dept: NEUROLOGY | Facility: CLINIC | Age: 50
End: 2020-10-02

## 2020-10-05 RX ORDER — GALCANEZUMAB 120 MG/ML
120 INJECTION, SOLUTION SUBCUTANEOUS
Qty: 1 ML | Refills: 5 | Status: SHIPPED | OUTPATIENT
Start: 2020-10-05 | End: 2021-03-05

## 2020-10-05 RX ORDER — GALCANEZUMAB 120 MG/ML
240 INJECTION, SOLUTION SUBCUTANEOUS ONCE
Qty: 2 ML | Refills: 0 | Status: SHIPPED | OUTPATIENT
Start: 2020-10-05 | End: 2020-10-05

## 2020-10-05 NOTE — PROGRESS NOTES
Chief Complaint   Patient presents with    Migraine     Aimovig is given her a rash        Yanni Kaiser is a 50 y.o. female with a history of multiple medical diagnoses as listed below that presents for evaluation of headaches.  The patient has a diagnosis of IH in addition to migraines.  Recently she was started on an increased dose Aimovig but finds that the medication was discontinued due to her persistent rash.  She has not noticed any significant improvement of her headaches since she still has been having headaches at least 20-25 days out of the month.  Most headaches are migraine but she also has other types of headaches intermixed throughout the course of the month.  She has been trying to refrain from using any over-the-counter medication without any significant improvement in her headache frequency, intensity, nor do her abortive therapies offer her any significant improvement.     PAST MEDICAL HISTORY:  Past Medical History:   Diagnosis Date    Allergy     Anemia     Asthma     Back pain     Chronic bronchitis     Cigarette smoker     DDD (degenerative disc disease), lumbar 2020    Depression     Diabetes with neurologic complications     DUB (dysfunctional uterine bleeding) 10/16/2018    GERD (gastroesophageal reflux disease)     High cholesterol     Hyperprolactinemia     Hypertension     Influenza A 2020    Obese body habitus     Obesity     Pseudotumor cerebri     Renal manifestation of secondary diabetes mellitus     Respiratory failure     Seizures     Simple endometrial hyperplasia 2018    Sleep apnea     Smoker     Tobacco dependence     Urinary incontinence        PAST SURGICAL HISTORY:  Past Surgical History:   Procedure Laterality Date    BREAST CYST ASPIRATION       SECTION      X 1    CHOLECYSTECTOMY      COLONOSCOPY      COLONOSCOPY N/A 2019    Procedure: COLONOSCOPY;  Surgeon: Jagruti Sweeney MD;  Location: [facility] ([facility]);  Service: Endoscopy;  Laterality: N/A;  BMI is 63/gastroparesis/3 days full liquid diet 1 day clear liquid see telephone encounter by Ciara Pinto [facility]    EPIDURAL STEROID INJECTION N/A 9/11/2020    Procedure: INJECTION, STEROID, EPIDURAL, L5-S1 IL;  Surgeon: Lawrence Marin MD;  Location: [facility];  Service: Pain Management;  Laterality: N/A;    ESOPHAGEAL MANOMETRY WITH MEASUREMENT OF IMPEDANCE N/A 11/5/2019    Procedure: MANOMETRY-ESOPHAGEAL-WITH IMPEDANCE;  Surgeon: Jagruti Sweeney MD;  Location: [facility] (2ND FLR);  Service: Endoscopy;  Laterality: N/A;  Hold Narcotics x 1 days   Hold TCA x 1 days  Propofol only. No fentanyl or benzodiazepine during sedation. If additional sedation needed, discuss with Dr. Sweeney.  10/29 - pt confirmed appt    ESOPHAGOGASTRODUODENOSCOPY N/A 1/14/2019    Procedure: EGD (ESOPHAGOGASTRODUODENOSCOPY);  Surgeon: Jagruti Sweeney MD;  [facility]  Service: Endoscopy;  Laterality: N/A;  BMI is 63/gastroparesis/3 days full liquid diet 1 day clear liquid see telephone encounter by Ciara Pinto [facility]    ESOPHAGOGASTRODUODENOSCOPY N/A 11/5/2019    Procedure: ESOPHAGOGASTRODUODENOSCOPY (EGD);  Surgeon: Jagruti Sweeney MD;  Location: [facility] ([address] [place] [address]);  Service: Endoscopy;  Laterality: N/A;  EGD with EndoFlip /+/- Botox  2nd floor for gastroparesis/BMI 63 **367lbs**  Bariatric Stretcher needed  Manometry probe to be placed endoscopically during EGD procedure  Due to change in protocol, Full liquid diet for 3 days/ 1 day of clear liquids  Hi    FOOT FRACTURE SURGERY Left     HYSTEROSCOPY WITH DILATION AND CURETTAGE OF UTERUS N/A 10/16/2018    [facility]    PLANTAR FASCIOTOMY Left 11/18/2019    Procedure: FASCIOTOMY, PLANTAR;  Surgeon: Valentino Orourke DPM;  [facility];  Service: Podiatry;  Laterality: Left;    RETINAL LASER PROCEDURE Left     SINUS SURGERY      TRANSFORAMINAL EPIDURAL INJECTION OF STEROID Bilateral 6/24/2020     Procedure: INJECTION, STEROID, EPIDURAL, TRANSFORAMINAL APPROACH;  Surgeon: Lawrence Marin MD;  Location: [facility];  Service: Pain Management;  Laterality: Bilateral;    UPPER GASTROINTESTINAL ENDOSCOPY         SOCIAL HISTORY:  Social History     Socioeconomic History    Marital status:      Spouse name: Not on file    Number of children: Not on file    Years of education: Not on file    Highest education level: Not on file   Occupational History    Not on file   Social Needs    Financial resource strain: Very hard    Food insecurity     Worry: Often true     Inability: Often true    Transportation needs     Medical: No     Non-medical: No   Tobacco Use    Smoking status: Current Every Day Smoker     Packs/day: 1.00     Years: 27.00     Pack years: 27.00     Types: Cigarettes     Start date: 1/1/1992    Smokeless tobacco: Never Used    Tobacco comment: 1/2 a pack if her days are good   Substance and Sexual Activity    Alcohol use: Not Currently     Alcohol/week: 0.0 standard drinks     Frequency: Never     Comment: socially    Drug use: No    Sexual activity: Yes     Partners: Male     Birth control/protection: None     Comment:    Lifestyle    Physical activity     Days per week: Not on file     Minutes per session: Not on file    Stress: Not on file   Relationships    Social connections     Talks on phone: More than three times a week     Gets together: Three times a week     Attends Latter day service: More than 4 times per year     Active member of club or organization: Yes     Attends meetings of clubs or organizations: More than 4 times per year     Relationship status:    Other Topics Concern    Not on file   Social History Narrative    Not on file       FAMILY HISTORY:  Family History   Problem Relation Age of Onset    Hypertension Mother     Diabetes Mother     Colon cancer Mother 50    Colon polyps Mother     Heart disease Father     COPD Father      Heart attack Father     Hypertension Father     Ulcers Father     Cancer Maternal Grandmother     Breast cancer Maternal Grandmother     Colon cancer Maternal Grandmother     Colon polyps Maternal Grandmother     No Known Problems Paternal Grandmother     No Known Problems Brother     No Known Problems Maternal Aunt     No Known Problems Maternal Uncle     No Known Problems Paternal Aunt     No Known Problems Paternal Uncle     No Known Problems Maternal Grandfather     No Known Problems Paternal Grandfather     Celiac disease Neg Hx     Cirrhosis Neg Hx     Crohn's disease Neg Hx     Cystic fibrosis Neg Hx     Esophageal cancer Neg Hx     Hemochromatosis Neg Hx     Inflammatory bowel disease Neg Hx     Irritable bowel syndrome Neg Hx     Liver cancer Neg Hx     Liver disease Neg Hx     Rectal cancer Neg Hx     Stomach cancer Neg Hx     Ulcerative colitis Neg Hx     Jonnie's disease Neg Hx     Tuberculosis Neg Hx     Lymphoma Neg Hx     Scleroderma Neg Hx     Rheum arthritis Neg Hx     Melanoma Neg Hx     Multiple sclerosis Neg Hx     Psoriasis Neg Hx     Lupus Neg Hx     Skin cancer Neg Hx     Amblyopia Neg Hx     Blindness Neg Hx     Cataracts Neg Hx     Glaucoma Neg Hx     Macular degeneration Neg Hx     Retinal detachment Neg Hx     Strabismus Neg Hx     Stroke Neg Hx     Thyroid disease Neg Hx        ALLERGIES AND MEDICATIONS: updated and reviewed.  Review of patient's allergies indicates:   Allergen Reactions    Gabapentin      Makes her twitch     Current Outpatient Medications   Medication Sig Dispense Refill    baclofen (LIORESAL) 10 MG tablet TAKE 1 TABLET(10 MG) BY MOUTH THREE TIMES DAILY 90 tablet 0    blood sugar diagnostic Strp 1 each by Misc.(Non-Drug; Combo Route) route 4 (four) times daily before meals and nightly. ACCU CHEK ELVIA PLUS METER 200 each 3    blood-glucose meter (ACCU-CHEK ELVIA PLUS METER) Misc TEST FOUR TIMES DAILY BEFORE MEALS AND  EVERY NIGHT 90 each 1    desloratadine (CLARINEX) 5 mg tablet Take 1 tablet (5 mg total) by mouth once daily. 30 tablet 11    erenumab-aooe (AIMOVIG AUTOINJECTOR) 140 mg/mL autoinjector Inject 140 mg into the skin every 28 days. 1 mL 11    lancets (ACCU-CHEK SOFTCLIX LANCETS) Misc 1 Device by Misc.(Non-Drug; Combo Route) route 4 (four) times daily. 200 each 3    levocetirizine (XYZAL) 5 MG tablet       lidocaine (LIDODERM) 5 % Place 1 patch onto the skin once daily. Remove & Discard patch within 12 hours or as directed by MD 15 patch 0    losartan (COZAAR) 100 MG tablet TAKE 1 TABLET(100 MG) BY MOUTH EVERY DAY 90 tablet 0    ondansetron (ZOFRAN) 8 MG tablet Take 1 tablet (8 mg total) by mouth every 8 (eight) hours as needed for Nausea. 90 tablet 5    pantoprazole (PROTONIX) 40 MG tablet Take 40 mg by mouth once daily.   3    QUEtiapine (SEROQUEL) 25 MG Tab Take 1 tablet (25 mg total) by mouth once daily. 90 tablet 0    semaglutide (OZEMPIC) 1 mg/dose (2 mg/1.5 mL) PnIj Inject 1 mg subq weekly. 9 mL 1    sumatriptan (IMITREX) 50 MG tablet Take 1 tab at onset of headaches.  If no improvement in 2 hours, take another.  Do not take more than 2 tabs in 24 hours. 9 tablet 5    albuterol (PROVENTIL/VENTOLIN HFA) 90 mcg/actuation inhaler Inhale 2 puffs into the lungs every 6 (six) hours as needed. Rescue 54 g 0    albuterol-ipratropium (DUO-NEB) 2.5 mg-0.5 mg/3 mL nebulizer solution Take 3 mLs by nebulization every 6 (six) hours as needed for Wheezing or Shortness of Breath. Rescue 100 mL 3    atorvastatin (LIPITOR) 40 MG tablet Take 1 tablet (40 mg total) by mouth once daily. 90 tablet 3    budesonide-formoterol 160-4.5 mcg (SYMBICORT) 160-4.5 mcg/actuation HFAA Inhale 2 puffs into the lungs every 12 (twelve) hours. Controller 1 Inhaler 5    DULoxetine (CYMBALTA) 30 MG capsule Take 1 capsule (30 mg total) by mouth once daily. 30 capsule 3    fremanezumab-vfrm (AJOVY AUTOINJECTOR) 225 mg/1.5 mL autoinjector  Inject 1.5 mLs (225 mg total) into the skin every 28 days. 1.5 mL 5    oxyCODONE-acetaminophen (PERCOCET)  mg per tablet Take 1 tablet by mouth every 12 (twelve) hours as needed for Pain. 40 tablet 0    topiramate (TROKENDI XR) 200 mg Cp24 Take 600 mg by mouth once daily. 90 capsule 1     No current facility-administered medications for this visit.        Review of Systems   Constitutional: Negative for activity change, appetite change, fever and unexpected weight change.   HENT: Negative for trouble swallowing and voice change.    Eyes: Positive for photophobia and visual disturbance.   Respiratory: Negative for apnea and shortness of breath.    Cardiovascular: Negative for chest pain and leg swelling.   Gastrointestinal: Positive for nausea and vomiting. Negative for constipation.   Genitourinary: Negative for difficulty urinating.   Musculoskeletal: Negative for back pain, gait problem and neck pain.   Skin: Negative for color change and pallor.   Neurological: Positive for headaches. Negative for dizziness, seizures, syncope, weakness and numbness.   Hematological: Negative for adenopathy.   Psychiatric/Behavioral: Negative for agitation, confusion and decreased concentration.       Neurologic Exam     Mental Status   Oriented to person, place, and time.   Registration: recalls 3 of 3 objects.   Attention: normal. Concentration: normal.   Speech: speech is normal   Level of consciousness: alert  Knowledge: good.     Cranial Nerves     CN II   Visual fields full to confrontation.   Right visual field deficit: none  Left visual field deficit: none     CN III, IV, VI   Pupils are equal, round, and reactive to light.  Extraocular motions are normal.   Right pupil: Size: 3 mm. Shape: regular. Accommodation: intact.   Left pupil: Size: 3 mm. Shape: regular. Accommodation: intact.   CN III: no CN III palsy  CN VI: no CN VI palsy  Nystagmus: none   Diplopia: none  Ophthalmoparesis: none  Upgaze:  normal  Downgaze: normal  Conjugate gaze: present    CN V   Facial sensation intact.   Right facial sensation deficit: none  Left facial sensation deficit: none    CN VII   Facial expression full, symmetric.   Right facial weakness: none  Left facial weakness: none    CN VIII   CN VIII normal.     CN IX, X   CN IX normal.   CN X normal.   Palate: symmetric    CN XI   CN XI normal.   Right sternocleidomastoid strength: normal  Left sternocleidomastoid strength: normal  Right trapezius strength: normal  Left trapezius strength: normal    CN XII   CN XII normal.   Tongue deviation: none    Motor Exam   Muscle bulk: normal  Overall muscle tone: normal  Right arm tone: normal  Left arm tone: normal  Right leg tone: normal  Left leg tone: normal    Strength   Strength 5/5 throughout.     Sensory Exam   Right arm light touch: normal  Left arm light touch: normal  Right leg light touch: normal  Left leg light touch: normal  Right arm vibration: normal  Left arm vibration: normal  Right leg vibration: normal  Left leg vibration: normal  Right arm proprioception: normal  Left arm proprioception: normal  Right leg proprioception: normal  Left leg proprioception: normal  Right arm pinprick: normal  Left arm pinprick: normal  Right leg pinprick: normal  Left leg pinprick: normal    Gait, Coordination, and Reflexes     Gait  Gait: normal    Coordination   Romberg: negative  Finger to nose coordination: normal  Heel to shin coordination: normal  Tandem walking coordination: normal    Tremor   Resting tremor: absent    Reflexes   Right brachioradialis: 2+  Left brachioradialis: 2+  Right biceps: 2+  Left biceps: 2+  Right triceps: 2+  Left triceps: 2+  Right patellar: 2+  Left patellar: 2+  Right achilles: 2+  Left achilles: 2+  Right plantar: normal  Left plantar: normal      Physical Exam  Vitals signs reviewed.   Constitutional:       Appearance: She is well-developed.   HENT:      Head: Normocephalic and atraumatic.   Eyes:      " Extraocular Movements: EOM normal.      Pupils: Pupils are equal, round, and reactive to light.   Neck:      Musculoskeletal: Normal range of motion.   Cardiovascular:      Rate and Rhythm: Normal rate.   Pulmonary:      Effort: Pulmonary effort is normal. No respiratory distress.   Musculoskeletal: Normal range of motion.   Skin:     General: Skin is warm and dry.   Neurological:      Mental Status: She is alert and oriented to person, place, and time.      Coordination: Finger-Nose-Finger Test, Heel to Shin Test and Romberg Test normal.      Gait: Gait is intact. Tandem walk normal.      Deep Tendon Reflexes: Strength normal.      Reflex Scores:       Tricep reflexes are 2+ on the right side and 2+ on the left side.       Bicep reflexes are 2+ on the right side and 2+ on the left side.       Brachioradialis reflexes are 2+ on the right side and 2+ on the left side.       Patellar reflexes are 2+ on the right side and 2+ on the left side.       Achilles reflexes are 2+ on the right side and 2+ on the left side.  Psychiatric:         Speech: Speech normal.         Behavior: Behavior normal.         Thought Content: Thought content normal.         Vitals:    09/22/20 1359   Weight: (!) 149.2 kg (328 lb 14.8 oz)   Height: 5' 4" (1.626 m)       Assessment & Plan:    Problem List Items Addressed This Visit     Migraine - Primary (Chronic)    Overview     Patient referred for Botox due to her poor response to multiple preventive medications in the past and her frequency of headache occurring almost daily         Relevant Medications    topiramate (TROKENDI XR) 200 mg Cp24    fremanezumab-vfrm (AJOVY AUTOINJECTOR) 225 mg/1.5 mL autoinjector    Other Relevant Orders    Prior authorization Order    Ambulatory referral/consult to Neurology        More than 50% of this 45 minute encounter was spent in counseling and coordinating care of headaches.    Follow-up: Follow up in about 2 months (around 11/22/2020).    This note " was done with the assistance of voice recognition software. Some errors may be present after proofreading.

## 2020-10-08 ENCOUNTER — OFFICE VISIT (OUTPATIENT)
Dept: FAMILY MEDICINE | Facility: CLINIC | Age: 50
End: 2020-10-08
Payer: MEDICARE

## 2020-10-08 VITALS
SYSTOLIC BLOOD PRESSURE: 136 MMHG | WEIGHT: 293 LBS | RESPIRATION RATE: 17 BRPM | DIASTOLIC BLOOD PRESSURE: 89 MMHG | OXYGEN SATURATION: 98 % | BODY MASS INDEX: 50.02 KG/M2 | HEART RATE: 71 BPM | HEIGHT: 64 IN | TEMPERATURE: 98 F

## 2020-10-08 DIAGNOSIS — J44.1 ACUTE EXACERBATION OF CHRONIC OBSTRUCTIVE PULMONARY DISEASE (COPD): Primary | ICD-10-CM

## 2020-10-08 DIAGNOSIS — J30.2 SEASONAL ALLERGIES: ICD-10-CM

## 2020-10-08 PROCEDURE — 94640 PR INHAL RX, AIRWAY OBST/DX SPUTUM INDUCT: ICD-10-PCS | Mod: 59,HCNC,S$GLB, | Performed by: FAMILY MEDICINE

## 2020-10-08 PROCEDURE — 96372 THER/PROPH/DIAG INJ SC/IM: CPT | Mod: HCNC,S$GLB,, | Performed by: FAMILY MEDICINE

## 2020-10-08 PROCEDURE — 3008F BODY MASS INDEX DOCD: CPT | Mod: HCNC,CPTII,S$GLB, | Performed by: FAMILY MEDICINE

## 2020-10-08 PROCEDURE — 3079F PR MOST RECENT DIASTOLIC BLOOD PRESSURE 80-89 MM HG: ICD-10-PCS | Mod: HCNC,CPTII,S$GLB, | Performed by: FAMILY MEDICINE

## 2020-10-08 PROCEDURE — 99214 OFFICE O/P EST MOD 30 MIN: CPT | Mod: HCNC,25,S$GLB, | Performed by: FAMILY MEDICINE

## 2020-10-08 PROCEDURE — 96372 PR INJECTION,THERAP/PROPH/DIAG2ST, IM OR SUBCUT: ICD-10-PCS | Mod: HCNC,S$GLB,, | Performed by: FAMILY MEDICINE

## 2020-10-08 PROCEDURE — 3075F PR MOST RECENT SYSTOLIC BLOOD PRESS GE 130-139MM HG: ICD-10-PCS | Mod: HCNC,CPTII,S$GLB, | Performed by: FAMILY MEDICINE

## 2020-10-08 PROCEDURE — 99214 PR OFFICE/OUTPT VISIT, EST, LEVL IV, 30-39 MIN: ICD-10-PCS | Mod: HCNC,25,S$GLB, | Performed by: FAMILY MEDICINE

## 2020-10-08 PROCEDURE — 94640 AIRWAY INHALATION TREATMENT: CPT | Mod: 59,HCNC,S$GLB, | Performed by: FAMILY MEDICINE

## 2020-10-08 PROCEDURE — 99999 PR PBB SHADOW E&M-EST. PATIENT-LVL V: ICD-10-PCS | Mod: PBBFAC,HCNC,, | Performed by: FAMILY MEDICINE

## 2020-10-08 PROCEDURE — 99999 PR PBB SHADOW E&M-EST. PATIENT-LVL V: CPT | Mod: PBBFAC,HCNC,, | Performed by: FAMILY MEDICINE

## 2020-10-08 PROCEDURE — 3075F SYST BP GE 130 - 139MM HG: CPT | Mod: HCNC,CPTII,S$GLB, | Performed by: FAMILY MEDICINE

## 2020-10-08 PROCEDURE — 3079F DIAST BP 80-89 MM HG: CPT | Mod: HCNC,CPTII,S$GLB, | Performed by: FAMILY MEDICINE

## 2020-10-08 PROCEDURE — 3008F PR BODY MASS INDEX (BMI) DOCUMENTED: ICD-10-PCS | Mod: HCNC,CPTII,S$GLB, | Performed by: FAMILY MEDICINE

## 2020-10-08 RX ORDER — AZITHROMYCIN 250 MG/1
TABLET, FILM COATED ORAL
Qty: 6 TABLET | Refills: 0 | Status: SHIPPED | OUTPATIENT
Start: 2020-10-08 | End: 2020-10-13

## 2020-10-08 RX ORDER — DIPHENHYDRAMINE HYDROCHLORIDE 50 MG/ML
50 INJECTION INTRAMUSCULAR; INTRAVENOUS
Status: COMPLETED | OUTPATIENT
Start: 2020-10-08 | End: 2020-10-08

## 2020-10-08 RX ORDER — IPRATROPIUM BROMIDE AND ALBUTEROL SULFATE 2.5; .5 MG/3ML; MG/3ML
3 SOLUTION RESPIRATORY (INHALATION)
Status: COMPLETED | OUTPATIENT
Start: 2020-10-08 | End: 2020-10-08

## 2020-10-08 RX ADMIN — IPRATROPIUM BROMIDE AND ALBUTEROL SULFATE 3 ML: 2.5; .5 SOLUTION RESPIRATORY (INHALATION) at 10:10

## 2020-10-08 RX ADMIN — DIPHENHYDRAMINE HYDROCHLORIDE 50 MG: 50 INJECTION INTRAMUSCULAR; INTRAVENOUS at 10:10

## 2020-10-08 NOTE — PROGRESS NOTES
Routine Office Visit    Patient Name: Yanni Kaiser    : 1970  MRN: [id number]    Subjective:  Yanni is a 50 y.o. female who presents today for:    1. Congestion and cough  Patient presenting today with congestion and cough x 2 weeks.  She states that she has drainage and coughing it up.  She continues to smoke.  There has been no fevers, chills, sweats, or loss of taste or smell.  She has been told numerous times that by her continuing to smoke puts her life at risk as she is asthmatic and has been admitted to ICU multiple times of COPD exacerbations.      Past Medical History  Past Medical History:   Diagnosis Date    Allergy     Anemia     Asthma     Back pain     Chronic bronchitis     Cigarette smoker     DDD (degenerative disc disease), lumbar 2020    Depression     Diabetes with neurologic complications     DUB (dysfunctional uterine bleeding) 10/16/2018    GERD (gastroesophageal reflux disease)     High cholesterol     Hyperprolactinemia     Hypertension     Influenza A 2020    Obese body habitus     Obesity     Pseudotumor cerebri     Renal manifestation of secondary diabetes mellitus     Respiratory failure     Seizures     Simple endometrial hyperplasia 2018    Sleep apnea     Smoker     Tobacco dependence     Urinary incontinence        Past Surgical History  Past Surgical History:   Procedure Laterality Date    BREAST CYST ASPIRATION       SECTION      X 1    CHOLECYSTECTOMY      COLONOSCOPY      COLONOSCOPY N/A 2019    Procedure: COLONOSCOPY;  Surgeon: Jagruti Sweeney MD;  Location: [facility] ENDO ([address] [place] [address]);  Service: Endoscopy;  Laterality: N/A;  BMI is 63/gastroparesis/3 days full liquid diet 1 day clear liquid see telephone encounter by Ciara Brown     EPIDURAL STEROID INJECTION N/A 2020    Procedure: INJECTION, STEROID, EPIDURAL, L5-S1 IL;  Surgeon: Lawrence Marin MD;  Location: [facility] PAIN MGT;  Service: Pain  Management;  Laterality: N/A;    ESOPHAGEAL MANOMETRY WITH MEASUREMENT OF IMPEDANCE N/A 11/5/2019    Procedure: MANOMETRY-ESOPHAGEAL-WITH IMPEDANCE;  Surgeon: Jagruti Sweeney MD;  Location: [facility] (2ND FLR);  Service: Endoscopy;  Laterality: N/A;  Hold Narcotics x 1 days   Hold TCA x 1 days  Propofol only. No fentanyl or benzodiazepine during sedation. If additional sedation needed, discuss with Dr. Sweeney.  10/29 - pt confirmed appt    ESOPHAGOGASTRODUODENOSCOPY N/A 1/14/2019    Procedure: EGD (ESOPHAGOGASTRODUODENOSCOPY);  Surgeon: Jagruti Sweeney MD;  Location: [facility] (2ND FLR);  Service: Endoscopy;  Laterality: N/A;  BMI is 63/gastroparesis/3 days full liquid diet 1 day clear liquid see telephone encounter by Ciara limon    ESOPHAGOGASTRODUODENOSCOPY N/A 11/5/2019    Procedure: ESOPHAGOGASTRODUODENOSCOPY (EGD);  Surgeon: Jagruti Sweeney MD;  [facility]  Service: Endoscopy;  Laterality: N/A;  EGD with EndoFlip /+/- Botox  2nd floor for gastroparesis/BMI 63 **367lbs**  Bariatric Stretcher needed  Manometry probe to be placed endoscopically during EGD procedure  Due to change in protocol, Full liquid diet for 3 days/ 1 day of clear liquids  Hi    FOOT FRACTURE SURGERY Left     HYSTEROSCOPY WITH DILATION AND CURETTAGE OF UTERUS N/A 10/16/2018    K[facility]    PLANTAR FASCIOTOMY Left 11/18/2019    Procedure: FASCIOTOMY, PLANTAR;  Surgeon: Valentino Orourke DPM;  [facility];  Service: Podiatry;  Laterality: Left;    RETINAL LASER PROCEDURE Left     SINUS SURGERY      TRANSFORAMINAL EPIDURAL INJECTION OF STEROID Bilateral 6/24/2020    Procedure: INJECTION, STEROID, EPIDURAL, TRANSFORAMINAL APPROACH;  Surgeon: Lawrence Marin MD;  Location: [facility] PAIN MGT;  Service: Pain Management;  Laterality: Bilateral;    UPPER GASTROINTESTINAL ENDOSCOPY         Family History  Family History   Problem Relation Age of Onset    Hypertension Mother     Diabetes Mother     Colon  cancer Mother 50    Colon polyps Mother     Heart disease Father     COPD Father     Heart attack Father     Hypertension Father     Ulcers Father     Cancer Maternal Grandmother     Breast cancer Maternal Grandmother     Colon cancer Maternal Grandmother     Colon polyps Maternal Grandmother     No Known Problems Paternal Grandmother     No Known Problems Brother     No Known Problems Maternal Aunt     No Known Problems Maternal Uncle     No Known Problems Paternal Aunt     No Known Problems Paternal Uncle     No Known Problems Maternal Grandfather     No Known Problems Paternal Grandfather     Celiac disease Neg Hx     Cirrhosis Neg Hx     Crohn's disease Neg Hx     Cystic fibrosis Neg Hx     Esophageal cancer Neg Hx     Hemochromatosis Neg Hx     Inflammatory bowel disease Neg Hx     Irritable bowel syndrome Neg Hx     Liver cancer Neg Hx     Liver disease Neg Hx     Rectal cancer Neg Hx     Stomach cancer Neg Hx     Ulcerative colitis Neg Hx     Jonnie's disease Neg Hx     Tuberculosis Neg Hx     Lymphoma Neg Hx     Scleroderma Neg Hx     Rheum arthritis Neg Hx     Melanoma Neg Hx     Multiple sclerosis Neg Hx     Psoriasis Neg Hx     Lupus Neg Hx     Skin cancer Neg Hx     Amblyopia Neg Hx     Blindness Neg Hx     Cataracts Neg Hx     Glaucoma Neg Hx     Macular degeneration Neg Hx     Retinal detachment Neg Hx     Strabismus Neg Hx     Stroke Neg Hx     Thyroid disease Neg Hx        Social History  Social History     Socioeconomic History    Marital status:      Spouse name: Not on file    Number of children: Not on file    Years of education: Not on file    Highest education level: Not on file   Occupational History    Not on file   Social Needs    Financial resource strain: Very hard    Food insecurity     Worry: Often true     Inability: Sometimes true    Transportation needs     Medical: Yes     Non-medical: Yes   Tobacco Use    Smoking  status: Current Every Day Smoker     Packs/day: 1.00     Years: 27.00     Pack years: 27.00     Types: Cigarettes     Start date: 1/1/1992    Smokeless tobacco: Never Used    Tobacco comment: 1/2 a pack if her days are good   Substance and Sexual Activity    Alcohol use: Not Currently     Alcohol/week: 0.0 standard drinks     Frequency: Never     Drinks per session: Patient refused     Binge frequency: Never     Comment: socially    Drug use: No    Sexual activity: Yes     Partners: Male     Birth control/protection: None     Comment:    Lifestyle    Physical activity     Days per week: 0 days     Minutes per session: 0 min    Stress: Very much   Relationships    Social connections     Talks on phone: Once a week     Gets together: Never     Attends [employer] service: More than 4 times per year     Active member of club or organization: Yes     Attends meetings of clubs or organizations: More than 4 times per year     Relationship status:    Other Topics Concern    Not on file   Social History Narrative    Not on file       Current Medications  Current Outpatient Medications on File Prior to Visit   Medication Sig Dispense Refill    albuterol (PROVENTIL/VENTOLIN HFA) 90 mcg/actuation inhaler Inhale 2 puffs into the lungs every 6 (six) hours as needed. Rescue 54 g 0    albuterol-ipratropium (DUO-NEB) 2.5 mg-0.5 mg/3 mL nebulizer solution Take 3 mLs by nebulization every 6 (six) hours as needed for Wheezing or Shortness of Breath. Rescue 100 mL 3    atorvastatin (LIPITOR) 40 MG tablet Take 1 tablet (40 mg total) by mouth once daily. 90 tablet 3    baclofen (LIORESAL) 10 MG tablet TAKE 1 TABLET(10 MG) BY MOUTH THREE TIMES DAILY 90 tablet 0    blood sugar diagnostic Strp 1 each by Misc.(Non-Drug; Combo Route) route 4 (four) times daily before meals and nightly. ACCU CHEK ELVIA PLUS METER 200 each 3    blood-glucose meter (ACCU-CHEK ELVIA PLUS METER) Misc TEST FOUR TIMES DAILY BEFORE MEALS  AND EVERY NIGHT 90 each 1    budesonide-formoterol 160-4.5 mcg (SYMBICORT) 160-4.5 mcg/actuation HFAA Inhale 2 puffs into the lungs every 12 (twelve) hours. Controller 1 Inhaler 5    desloratadine (CLARINEX) 5 mg tablet Take 1 tablet (5 mg total) by mouth once daily. 30 tablet 11    DULoxetine (CYMBALTA) 30 MG capsule Take 1 capsule (30 mg total) by mouth once daily. 30 capsule 3    galcanezumab-gnlm (EMGALITY PEN) 120 mg/mL PnIj Inject 120 mg into the skin every 28 days. 1 mL 5    lancets (ACCU-CHEK SOFTCLIX LANCETS) Misc 1 Device by Misc.(Non-Drug; Combo Route) route 4 (four) times daily. 200 each 3    levocetirizine (XYZAL) 5 MG tablet       lidocaine (LIDODERM) 5 % Place 1 patch onto the skin once daily. Remove & Discard patch within 12 hours or as directed by MD 15 patch 0    losartan (COZAAR) 100 MG tablet TAKE 1 TABLET(100 MG) BY MOUTH EVERY DAY 90 tablet 0    ondansetron (ZOFRAN) 8 MG tablet Take 1 tablet (8 mg total) by mouth every 8 (eight) hours as needed for Nausea. 90 tablet 5    oxyCODONE-acetaminophen (PERCOCET)  mg per tablet Take 1 tablet by mouth every 12 (twelve) hours as needed for Pain. 40 tablet 0    pantoprazole (PROTONIX) 40 MG tablet Take 40 mg by mouth once daily.   3    QUEtiapine (SEROQUEL) 25 MG Tab Take 1 tablet (25 mg total) by mouth once daily. 90 tablet 0    semaglutide (OZEMPIC) 1 mg/dose (2 mg/1.5 mL) PnIj Inject 1 mg subq weekly. 9 mL 1    sumatriptan (IMITREX) 50 MG tablet Take 1 tab at onset of headaches.  If no improvement in 2 hours, take another.  Do not take more than 2 tabs in 24 hours. 9 tablet 5    topiramate (TROKENDI XR) 200 mg Cp24 Take 600 mg by mouth once daily. 90 capsule 1     No current facility-administered medications on file prior to visit.        Allergies   Review of patient's allergies indicates:   Allergen Reactions    Gabapentin      Makes her twitch       Review of Systems (Pertinent positives)  Review of Systems   Constitutional:  "Negative for fever.   HENT: Negative for ear pain and sore throat.    Respiratory: Positive for sputum production, shortness of breath and wheezing. Negative for hemoptysis.    Cardiovascular: Positive for orthopnea, claudication, leg swelling and PND. Negative for chest pain.   Gastrointestinal: Negative for vomiting.   Musculoskeletal: Positive for neck pain.   Skin: Negative for rash.   Neurological: Positive for headaches.         /89 (BP Location: Left arm, Patient Position: Sitting, BP Method: Large (Automatic))   Pulse 71   Temp 97.5 °F (36.4 °C) (Oral)   Resp 17   Ht 5' 4.02" (1.626 m)   Wt (!) 148.6 kg (327 lb 9.7 oz)   SpO2 98%   BMI 56.20 kg/m²     GENERAL APPEARANCE: in no apparent distress and well developed and well nourished  HEENT: PERRL, EOMI, Sclera clear, anicteric, Oropharynx clear, no lesions, Neck supple with midline trachea  NECK: normal, supple, no adenopathy, thyroid normal in size  RESPIRATORY: appears well, vitals normal, no respiratory distress, acyanotic, normal RR, decreased air entry bilaterally with occasional wheeze; no crackles  HEART: regular rate and rhythm, S1, S2 normal, no murmur, click, rub or gallop.    ABDOMEN: abdomen is soft without tenderness, no masses, no hernias, no organomegaly, no rebound, no guarding. Suprapubic tenderness absent. No CVA tenderness.  SKIN: no rashes, no wounds, no other lesions  PSYCH: Alert, oriented x 3, thought content appropriate, speech normal, pleasant and cooperative, good eye contact, well groomed    Assessment/Plan:  Yanni Kaiser is a 50 y.o. female who presents today for :    Yanni was seen today for nasal congestion.    Diagnoses and all orders for this visit:    Acute exacerbation of chronic obstructive pulmonary disease (COPD)  -     azithromycin (Z-RHODA) 250 MG tablet; Take 2 tablets by mouth on day 1; Take 1 tablet by mouth on days 2-5  -     methylPREDNISolone sod suc(PF) injection 125 mg  -     " albuterol-ipratropium 2.5 mg-0.5 mg/3 mL nebulizer solution 3 mL    Seasonal allergies  -     diphenhydrAMINE injection 50 mg      1.  Will treat as copd exacerbation as well as exacerbation of environmental allergies  2.  Call with any concerns  3.  Go to the ED for any worsening symptoms  4.  Improved with duoneb  5.  Follow up as needed  6.  She is to monitor blood sugars after getting steroid shot and notify me if they get over 200mg/dl        Carlyle Gordillo MD

## 2020-10-08 NOTE — PROGRESS NOTES
Duo-neb given via Nebulizer as ordered. Pt tolerating well.     Pt tolerated injections well. Instructed to wait in the clinic for 15 minutes and report any adverse effects immediately to the nurse. Verbalized understanding.

## 2020-10-12 ENCOUNTER — PATIENT MESSAGE (OUTPATIENT)
Dept: GASTROENTEROLOGY | Facility: CLINIC | Age: 50
End: 2020-10-12

## 2020-10-12 RX ORDER — LEVOCETIRIZINE DIHYDROCHLORIDE 5 MG/1
5 TABLET, FILM COATED ORAL NIGHTLY
Qty: 90 TABLET | Refills: 0 | Status: SHIPPED | OUTPATIENT
Start: 2020-10-12 | End: 2020-12-03 | Stop reason: SDUPTHER

## 2020-10-13 ENCOUNTER — TELEPHONE (OUTPATIENT)
Dept: GASTROENTEROLOGY | Facility: CLINIC | Age: 50
End: 2020-10-13

## 2020-10-13 ENCOUNTER — PATIENT MESSAGE (OUTPATIENT)
Dept: GASTROENTEROLOGY | Facility: CLINIC | Age: 50
End: 2020-10-13

## 2020-10-13 ENCOUNTER — OFFICE VISIT (OUTPATIENT)
Dept: GASTROENTEROLOGY | Facility: CLINIC | Age: 50
End: 2020-10-13
Payer: MEDICARE

## 2020-10-13 DIAGNOSIS — R13.10 DYSPHAGIA, UNSPECIFIED TYPE: ICD-10-CM

## 2020-10-13 DIAGNOSIS — K59.00 CONSTIPATION, UNSPECIFIED CONSTIPATION TYPE: ICD-10-CM

## 2020-10-13 DIAGNOSIS — R11.2 NAUSEA AND VOMITING, INTRACTABILITY OF VOMITING NOT SPECIFIED, UNSPECIFIED VOMITING TYPE: ICD-10-CM

## 2020-10-13 DIAGNOSIS — E11.43 GASTROPARESIS DUE TO DM: ICD-10-CM

## 2020-10-13 DIAGNOSIS — K31.84 GASTROPARESIS DUE TO DM: ICD-10-CM

## 2020-10-13 DIAGNOSIS — A04.8 H. PYLORI INFECTION: ICD-10-CM

## 2020-10-13 DIAGNOSIS — R07.89 NON-CARDIAC CHEST PAIN: ICD-10-CM

## 2020-10-13 DIAGNOSIS — K21.9 GASTROESOPHAGEAL REFLUX DISEASE, UNSPECIFIED WHETHER ESOPHAGITIS PRESENT: Primary | ICD-10-CM

## 2020-10-13 DIAGNOSIS — R68.81 EARLY SATIETY: ICD-10-CM

## 2020-10-13 PROCEDURE — 3044F PR MOST RECENT HEMOGLOBIN A1C LEVEL <7.0%: ICD-10-PCS | Mod: HCNC,CPTII,95, | Performed by: INTERNAL MEDICINE

## 2020-10-13 PROCEDURE — 99215 PR OFFICE/OUTPT VISIT, EST, LEVL V, 40-54 MIN: ICD-10-PCS | Mod: HCNC,95,, | Performed by: INTERNAL MEDICINE

## 2020-10-13 PROCEDURE — 99499 UNLISTED E&M SERVICE: CPT | Mod: 95,,, | Performed by: INTERNAL MEDICINE

## 2020-10-13 PROCEDURE — 3044F HG A1C LEVEL LT 7.0%: CPT | Mod: HCNC,CPTII,95, | Performed by: INTERNAL MEDICINE

## 2020-10-13 PROCEDURE — 99499 RISK ADDL DX/OHS AUDIT: ICD-10-PCS | Mod: 95,,, | Performed by: INTERNAL MEDICINE

## 2020-10-13 PROCEDURE — 99215 OFFICE O/P EST HI 40 MIN: CPT | Mod: HCNC,95,, | Performed by: INTERNAL MEDICINE

## 2020-10-13 RX ORDER — ONDANSETRON HYDROCHLORIDE 8 MG/1
8 TABLET, FILM COATED ORAL EVERY 8 HOURS PRN
Qty: 90 TABLET | Refills: 11 | Status: SHIPPED | OUTPATIENT
Start: 2020-10-13
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
## 2020-10-13 NOTE — PATIENT INSTRUCTIONS
-See Gi dietitian to go over gastroparesis diet   -Take multivitamin daily   -Follow up with Dr. Burroughs to discuss ozempic as this may be making your Gi symptoms worse   Side Effects: nausea (11% to 20%), abdominal pain (6% to 11%), decreased appetite (oral: 6% to 9%), vomiting (5% to 9%), constipation (3% to 6%), abdominal distension (oral: 2% to 3%), flatulence (1% to 2%), gastritis (oral: 2%), gastroesophageal reflux disease (2%).   -Complete EGD w BRAVO off medication   -You may benefit from SIBO breath testing for bacterial overgrowth.    SIBO is a condition in which colonic-type bacteria - resembling bacteria normally found in the colon - proliferate in large numbers in the small intestine.  The symptoms of SIBO are abdominal pain, bloating, wind, constipation, and diarrhea. SIBO is treated with antibiotics  We do not offer this test at [facility] and work with [employer] to complete it at home.    This test is out of network with most insurances and cost $175.  Some insurances may agree to pay for some of the cost.   If you want to get tested, please call your insurance to inquire about SIBO breath test coverage.    You can contact [employer] at [phone] with any questions about this process.    Please call us back after your clarify cost and coverage with insurance and [employer] to let us know that you want us to order this test for you.  -Stop linzess   -Try IBguard 1 tablet three times per day as needed for abdominal pain, bloating.  You can purchase this over the counter. Look for coupons on line.   -Start amitiza 24 mcg twice per day for constipation.  This should help with constipation, abdominal pain and bloating. Most common side effect is diarrhea.  Let me know if this happens and we can decrease the dose.   -Check stool for H. Pylori off pantoprazole for 2 weeks  -Follow up with pain management to work on  eliminating narcotic pain medication as this is having negative impact on your Gi problems.    -Follow up with Maya for management of your chronic Gi problems.  Discuss your anemia and consider video capsule endoscopy.

## 2020-10-13 NOTE — TELEPHONE ENCOUNTER
MOTILITY CLINIC PROCEDURE ORDERS    CLEARANCE FOR PROCEDURES:  No needed     PROCEDURES  EGD w dilation   EGD with BRAVO 48 hours     FLOOR:  2nd Floor    Reason for 2nd Floor:   Gastroparesis       PREP  Full liquid diet 3 days     MEDICATIONS  OFF PPI/H2 Blocker

## 2020-10-15 ENCOUNTER — TELEPHONE (OUTPATIENT)
Dept: FAMILY MEDICINE | Facility: CLINIC | Age: 50
End: 2020-10-15

## 2020-10-15 NOTE — TELEPHONE ENCOUNTER
----- Message from Nupur Jones sent at 10/15/2020 10:32 AM CDT -----  Name of Who is Calling: VANI CAMPUZANO [[id number]]      What is the request in detail: Pt states she needs an order for a COVID19 placed in the system. Please contact to further discuss and advise.        Can the clinic reply by [facility]: N      What Number [facility] Back [facility]:  [phone]

## 2020-10-15 NOTE — TELEPHONE ENCOUNTER
I called and the pt grandchildren were exposed to COVID and they live with her. She currently has no sx. She was referred to a Community testing site for testing.

## 2020-10-19 ENCOUNTER — LAB VISIT (OUTPATIENT)
Dept: LAB | Facility: HOSPITAL | Age: 50
End: 2020-10-19
Attending: INTERNAL MEDICINE
Payer: MEDICARE

## 2020-10-19 DIAGNOSIS — A04.8 H. PYLORI INFECTION: ICD-10-CM

## 2020-10-19 PROCEDURE — 87338 HPYLORI STOOL AG IA: CPT | Mod: HCNC

## 2020-10-20 ENCOUNTER — TELEPHONE (OUTPATIENT)
Dept: ENDOSCOPY | Facility: HOSPITAL | Age: 50
End: 2020-10-20

## 2020-10-20 NOTE — TELEPHONE ENCOUNTER
Called patient regarding order for EGD with [facility] for Dr. Sweeney.  No answer and message was left with number to return call.

## 2020-10-22 DIAGNOSIS — Z01.818 PRE-OP TESTING: Primary | ICD-10-CM

## 2020-10-23 ENCOUNTER — HOSPITAL ENCOUNTER (OUTPATIENT)
Facility: OTHER | Age: 50
Discharge: HOME | End: 2020-10-23
Attending: ANESTHESIOLOGY | Admitting: ANESTHESIOLOGY
Payer: MEDICARE

## 2020-10-23 VITALS
OXYGEN SATURATION: 99 % | HEIGHT: 64 IN | BODY MASS INDEX: 50.02 KG/M2 | DIASTOLIC BLOOD PRESSURE: 91 MMHG | TEMPERATURE: 98 F | WEIGHT: 293 LBS | HEART RATE: 57 BPM | RESPIRATION RATE: 20 BRPM | SYSTOLIC BLOOD PRESSURE: 149 MMHG

## 2020-10-23 DIAGNOSIS — G89.29 CHRONIC PAIN: ICD-10-CM

## 2020-10-23 DIAGNOSIS — M79.10 MYALGIA: ICD-10-CM

## 2020-10-23 DIAGNOSIS — M47.816 LUMBAR SPONDYLOSIS: Primary | ICD-10-CM

## 2020-10-23 PROCEDURE — 99152 MOD SED SAME PHYS/QHP 5/>YRS: CPT | Mod: HCNC | Performed by: ANESTHESIOLOGY

## 2020-10-23 PROCEDURE — 99152 MOD SED SAME PHYS/QHP 5/>YRS: CPT | Mod: HCNC,,, | Performed by: ANESTHESIOLOGY

## 2020-10-23 PROCEDURE — 64493 INJ PARAVERT F JNT L/S 1 LEV: CPT | Mod: 50,HCNC,, | Performed by: ANESTHESIOLOGY

## 2020-10-23 PROCEDURE — 64494 PR INJ DX/THER AGNT PARAVERT FACET JOINT,IMG GUIDE,LUMBAR/SAC, 2ND LEVEL: ICD-10-PCS | Mod: 50,HCNC,, | Performed by: ANESTHESIOLOGY

## 2020-10-23 PROCEDURE — 64494 INJ PARAVERT F JNT L/S 2 LEV: CPT | Mod: 50,HCNC,, | Performed by: ANESTHESIOLOGY

## 2020-10-23 PROCEDURE — 25000003 PHARM REV CODE 250: Mod: HCNC | Performed by: ANESTHESIOLOGY

## 2020-10-23 PROCEDURE — 64493 INJ PARAVERT F JNT L/S 1 LEV: CPT | Mod: 50,HCNC | Performed by: ANESTHESIOLOGY

## 2020-10-23 PROCEDURE — 99152 PR MOD CONSCIOUS SEDATION, SAME PHYS, 5+ YRS, FIRST 15 MIN: ICD-10-PCS | Mod: HCNC,,, | Performed by: ANESTHESIOLOGY

## 2020-10-23 PROCEDURE — 82947 ASSAY GLUCOSE BLOOD QUANT: CPT | Mod: HCNC | Performed by: ANESTHESIOLOGY

## 2020-10-23 PROCEDURE — 64493 PR INJ DX/THER AGNT PARAVERT FACET JOINT,IMG GUIDE,LUMBAR/SAC,1ST LVL: ICD-10-PCS | Mod: 50,HCNC,, | Performed by: ANESTHESIOLOGY

## 2020-10-23 PROCEDURE — 63600175 PHARM REV CODE 636 W HCPCS: Mod: HCNC | Performed by: ANESTHESIOLOGY

## 2020-10-23 PROCEDURE — 64494 INJ PARAVERT F JNT L/S 2 LEV: CPT | Mod: 50,HCNC | Performed by: ANESTHESIOLOGY

## 2020-10-23 PROCEDURE — 25500020 PHARM REV CODE 255: Mod: HCNC | Performed by: ANESTHESIOLOGY

## 2020-10-23 RX ORDER — BUPIVACAINE HYDROCHLORIDE 2.5 MG/ML
INJECTION, SOLUTION EPIDURAL; INFILTRATION; INTRACAUDAL
Status: DISCONTINUED | OUTPATIENT
Start: 2020-10-23 | End: 2020-10-23 | Stop reason: HOSPADM

## 2020-10-23 RX ORDER — LIDOCAINE HYDROCHLORIDE 10 MG/ML
INJECTION INFILTRATION; PERINEURAL
Status: DISCONTINUED | OUTPATIENT
Start: 2020-10-23 | End: 2020-10-23 | Stop reason: HOSPADM

## 2020-10-23 RX ORDER — ALPRAZOLAM 0.5 MG/1
1 TABLET ORAL ONCE
Status: DISCONTINUED | OUTPATIENT
Start: 2020-10-23 | End: 2020-10-23 | Stop reason: HOSPADM

## 2020-10-23 RX ORDER — SODIUM CHLORIDE 9 MG/ML
INJECTION, SOLUTION INTRAVENOUS ONCE
Status: COMPLETED | OUTPATIENT
Start: 2020-10-23 | End: 2020-10-23

## 2020-10-23 RX ORDER — MIDAZOLAM HYDROCHLORIDE 1 MG/ML
INJECTION INTRAMUSCULAR; INTRAVENOUS
Status: DISCONTINUED | OUTPATIENT
Start: 2020-10-23 | End: 2020-10-23 | Stop reason: HOSPADM

## 2020-10-23 RX ORDER — FENTANYL CITRATE 50 UG/ML
INJECTION, SOLUTION INTRAMUSCULAR; INTRAVENOUS
Status: DISCONTINUED | OUTPATIENT
Start: 2020-10-23 | End: 2020-10-23 | Stop reason: HOSPADM

## 2020-10-23 RX ORDER — ALPRAZOLAM 0.5 MG/1
0.5 TABLET ORAL
Status: DISCONTINUED | OUTPATIENT
Start: 2020-10-23 | End: 2020-10-23 | Stop reason: HOSPADM

## 2020-10-23 RX ADMIN — SODIUM CHLORIDE: 0.9 INJECTION, SOLUTION INTRAVENOUS at 03:10

## 2020-10-23 NOTE — DISCHARGE SUMMARY
Discharge Note  Short Stay      SUMMARY     Admit Date: 10/23/2020    Attending Physician: Lawrence Marin      Discharge Physician: Lawrence Marin      Discharge Date: 10/23/2020 4:22 PM    Procedure(s) (LRB):  BLOCK, NERVE  bilateral L3,4,5 MBB (Bilateral)    Final Diagnosis: Lumbar spondylosis [M47.816]    Disposition: Home or self care    Patient Instructions:   Current Discharge Medication List      CONTINUE these medications which have NOT CHANGED    Details   albuterol (PROVENTIL/VENTOLIN HFA) 90 mcg/actuation inhaler Inhale 2 puffs into the lungs every 6 (six) hours as needed. Rescue  Qty: 54 g, Refills: 0    Comments: **Patient requests 90 days supply**  Associated Diagnoses: Severe persistent asthma, unspecified whether complicated      albuterol-ipratropium (DUO-NEB) 2.5 mg-0.5 mg/3 mL nebulizer solution Take 3 mLs by nebulization every 6 (six) hours as needed for Wheezing or Shortness of Breath. Rescue  Qty: 100 mL, Refills: 3    Associated Diagnoses: Moderate persistent asthma with exacerbation      atorvastatin (LIPITOR) 40 MG tablet Take 1 tablet (40 mg total) by mouth once daily.  Qty: 90 tablet, Refills: 3    Associated Diagnoses: Mixed hyperlipidemia      baclofen (LIORESAL) 10 MG tablet TAKE 1 TABLET(10 MG) BY MOUTH THREE TIMES DAILY  Qty: 90 tablet, Refills: 0    Associated Diagnoses: Myalgia      blood sugar diagnostic Strp 1 each by Misc.(Non-Drug; Combo Route) route 4 (four) times daily before meals and nightly. ACCU CHEK ELVIA PLUS METER  Qty: 200 each, Refills: 3      blood-glucose meter (ACCU-CHEK ELVIA PLUS METER) Misc TEST FOUR TIMES DAILY BEFORE MEALS AND EVERY NIGHT  Qty: 90 each, Refills: 1    Associated Diagnoses: Type 2 diabetes mellitus with stage 3 chronic kidney disease, with long-term current use of insulin      budesonide-formoterol 160-4.5 mcg (SYMBICORT) 160-4.5 mcg/actuation HFAA Inhale 2 puffs into the lungs every 12 (twelve) hours. Controller  Qty: 1 Inhaler, Refills: 5     Associated Diagnoses: Severe persistent asthma, unspecified whether complicated      DULoxetine (CYMBALTA) 30 MG capsule Take 1 capsule (30 mg total) by mouth once daily.  Qty: 30 capsule, Refills: 3    Associated Diagnoses: Intractable chronic migraine without aura and without status migrainosus; Other chronic pain      galcanezumab-gnlm (EMGALITY PEN) 120 mg/mL PnIj Inject 120 mg into the skin every 28 days.  Qty: 1 mL, Refills: 5    Associated Diagnoses: Intractable chronic migraine without aura and without status migrainosus      lancets (ACCU-CHEK SOFTCLIX LANCETS) Misc 1 Device by Misc.(Non-Drug; Combo Route) route 4 (four) times daily.  Qty: 200 each, Refills: 3      levocetirizine (XYZAL) 5 MG tablet Take 1 tablet (5 mg total) by mouth every evening.  Qty: 90 tablet, Refills: 0      lidocaine (LIDODERM) 5 % Place 1 patch onto the skin once daily. Remove & Discard patch within 12 hours or as directed by MD  Qty: 15 patch, Refills: 0      losartan (COZAAR) 100 MG tablet TAKE 1 TABLET(100 MG) BY MOUTH EVERY DAY  Qty: 90 tablet, Refills: 0    Associated Diagnoses: Benign essential HTN      ondansetron (ZOFRAN) 8 MG tablet Take 1 tablet (8 mg total) by mouth every 8 (eight) hours as needed for Nausea.  Qty: 90 tablet, Refills: 11    Associated Diagnoses: Nausea and vomiting, intractability of vomiting not specified, unspecified vomiting type      oxyCODONE-acetaminophen (PERCOCET)  mg per tablet Take 1 tablet by mouth every 12 (twelve) hours as needed for Pain.  Qty: 40 tablet, Refills: 0    Comments: Quantity prescribed more than 7 day supply? Yes, quantity medically necessary      pantoprazole (PROTONIX) 40 MG tablet Take 40 mg by mouth once daily.   Refills: 3      QUEtiapine (SEROQUEL) 25 MG Tab Take 1 tablet (25 mg total) by mouth once daily.  Qty: 90 tablet, Refills: 0    Associated Diagnoses: Severe episode of recurrent major depressive disorder, with psychotic features      semaglutide (OZEMPIC) 1  mg/dose (2 mg/1.5 mL) PnIj Inject 1 mg subq weekly.  Qty: 9 mL, Refills: 1    Associated Diagnoses: Type 2 diabetes mellitus with stage 3 chronic kidney disease, with long-term current use of insulin      sumatriptan (IMITREX) 50 MG tablet Take 1 tab at onset of headaches.  If no improvement in 2 hours, take another.  Do not take more than 2 tabs in 24 hours.  Qty: 9 tablet, Refills: 5    Associated Diagnoses: Chronic nonintractable headache, unspecified headache type      topiramate (TROKENDI XR) 200 mg Cp24 Take 600 mg by mouth once daily.  Qty: 90 capsule, Refills: 1    Associated Diagnoses: Intractable chronic migraine without aura and without status migrainosus                 Discharge Diagnosis: Lumbar spondylosis [M47.816]  Condition on Discharge: Stable with no complications to procedure   Diet on Discharge: Same as before.  Activity: as per instruction sheet.  Discharge to: Home with a responsible adult.  Follow up: 2-4 weeks

## 2020-10-23 NOTE — OP NOTE
"Patient Name: Yanni Kaiser  MRN: [id number]    INFORMED CONSENT: The procedure, risks, benefits and options were discussed with patient. There are no contraindications to the procedure. The patient expressed understanding and agreed to proceed. The personnel performing the procedure was discussed. I verify that I personally obtained Yanni's consent prior to the start of the procedure and the signed consent can be found on the patient's chart.    Procedure Date: 10/23/2020    Anesthesia: Topical    Pre Procedure diagnosis: Lumbar spondylosis [M47.816]  1. Lumbar spondylosis    2. Chronic pain      Post-Procedure diagnosis: SAME      Moderate Sedation: Yes - Fentanyl 50 mcg and Midazolam 2 mg  Sedation time: Less than 15 minutes    PROCEDURE: Bilateral L3, L4 and L5 LUMBAR FACET MEDIAL BRANCH NERVE BLOCK      DESCRIPTION OF PROCEDURE:The patient was brought to the procedure room. After performing time out. IV access was obtained prior to the procedure. The patient was positioned prone on the fluoroscopy table. Continuous hemodynamic monitoring was initiated including blood pressure, EKG, and pulse oximetry. The area of the lumbar spine was prepped chlorhexidine and draped into a sterile field. Fluoroscopy was used to identify the location of the bilateral side L3, L4, and L5 medial branch nerves at the junctions of the superior articular process and the transverse processes of L3, L4, L5, and the sacral ala respectively. Skin anesthesia was achieved using 5 cc of Lidocaine 1% over the injection sites. A 22 gauge, 5" spinal needle was slowly inserted at each level using AP, lateral and oblique fluoroscopic imaging. Negative aspiration for blood or CSF was confirmed. Total 0.5-1 cc of Omnipaque  dye was inserted to confirm placement.  6 ml bupivacaine 0.25% was injected at all sites. The needles were removed and bleeding was nil. A sterile dressing was applied. No specimens collected. Yanni was taken back to " the recovery room for further observation.     Blood Loss: Nill  Specimen: None    Lawrence Marin MD

## 2020-10-23 NOTE — DISCHARGE INSTRUCTIONS
Thank you for allowing us to care for you today. You may receive a survey about the care we provided. Your feedback is valuable and helps us provide excellent care throughout the community.     Home Care Instructions for Pain Management:    1. DIET:   You may resume your normal diet today.   2. BATHING:   You may shower with luke warm water. No tub baths or anything that will soak injection sites under water for the next 24 hours.  3. DRESSING:   You may remove your bandage today.   4. ACTIVITY LEVEL:   You may resume your normal activities 24 hrs after your procedure. Nothing strenuous today.  5. MEDICATIONS:   You may resume your normal medications today. To restart blood thinners, ask your doctor.  6. DRIVING    If you have received any sedatives by mouth today, you may not drive for 12 hours.    If you have received any sedation through your IV, you may not drive for 24 hrs.   7. SPECIAL INSTRUCTIONS:   No heat to the injection site for 24 hrs including, hot bath or shower, heating pad, moist heat, or hot tubs.    Use ice pack to injection site for any pain or discomfort.  Apply ice packs for 20 minute intervals as needed.    IF you have diabetes, be sure to monitor your blood sugar more closely. IF your injection contained steroids your blood sugar levels may become higher than normal.    If you are still having pain upon discharge:  Your pain may improve over the next 48 hours. The anesthetic (numbing medication) works immediately to 48 hours. IF your injection contained a steroid (anti-inflammatory medication), it takes approximately 3 days to start feeling relief and 7-10 days to see your greatest results from the medication. It is possible you may need subsequent injections. This would be discussed at your follow up appointment with pain management or your referring doctor.    Please call the PAIN MANAGEMENT office at [phone] or ON CALL pager at [phone] if you experienced any:   -Weakness or  loss of sensation  -Fever > 101.5  -Pain uncontrolled with oral medications   -Persistent nausea, vomiting, or diarrhea  -Redness or drainage from the injection sites, or any other worrisome concerns.   If physician on call was not reached or could not communicate with our office for any reason please go to the nearest emergency department.  Adult Procedural Sedation Instructions    Recovery After Procedural Sedation (Adult)  You have been given medicine by vein to make you sleep during your surgery. This may have included both a pain medicine and sleeping medicine. Most of the effects have worn off. But you may still have some drowsiness for the next 6 to 8 hours.  Home care  Follow these guidelines when you get home:  · For the next 8 hours, you should be watched by a responsible adult. This person should make sure your condition is not getting worse.  · Don't drink any alcohol for the next 24 hours.  · Don't drive, operate dangerous machinery, or make important business or personal decisions during the next 24 hours.  Note: Your healthcare provider may tell you not to take any medicine by mouth for pain or sleep in the next 4 hours. These medicines may react with the medicines you were given in the hospital. This could cause a much stronger response than usual.  Follow-up care  Follow up with your healthcare provider if you are not alert and back to your usual level of activity within 12 hours.  When to seek medical advice  Call your healthcare provider right away if any of these occur:  · Drowsiness gets worse  · Weakness or dizziness gets worse  · Repeated vomiting  · You can't be awakened   Date Last Reviewed: 10/18/2016  © 7272-8330 [employer]. [address] [place] [address], [place] [address]. All rights reserved. This information is not intended as a substitute for professional medical care. Always follow your healthcare professional's instructions.

## 2020-10-25 RX ORDER — BACLOFEN 10 MG/1
10 TABLET ORAL 3 TIMES DAILY
Qty: 90 TABLET | Refills: 0 | Status: SHIPPED | OUTPATIENT
Start: 2020-10-25 | End: 2021-09-23 | Stop reason: ALTCHOICE

## 2020-10-26 ENCOUNTER — OFFICE VISIT (OUTPATIENT)
Dept: FAMILY MEDICINE | Facility: CLINIC | Age: 50
End: 2020-10-26
Payer: MEDICARE

## 2020-10-26 VITALS
RESPIRATION RATE: 17 BRPM | OXYGEN SATURATION: 97 % | BODY MASS INDEX: 56.65 KG/M2 | WEIGHT: 293 LBS | SYSTOLIC BLOOD PRESSURE: 136 MMHG | TEMPERATURE: 98 F | DIASTOLIC BLOOD PRESSURE: 78 MMHG | HEART RATE: 81 BPM

## 2020-10-26 DIAGNOSIS — J44.1 ACUTE EXACERBATION OF COPD WITH ASTHMA: Primary | ICD-10-CM

## 2020-10-26 DIAGNOSIS — J30.2 SEASONAL ALLERGIES: ICD-10-CM

## 2020-10-26 DIAGNOSIS — J45.901 ACUTE EXACERBATION OF COPD WITH ASTHMA: Primary | ICD-10-CM

## 2020-10-26 LAB
H PYLORI AG STL QL IA: DETECTED
POCT GLUCOSE: 74 MG/DL (ref 70–110)

## 2020-10-26 PROCEDURE — 3008F BODY MASS INDEX DOCD: CPT | Mod: HCNC,CPTII,S$GLB, | Performed by: FAMILY MEDICINE

## 2020-10-26 PROCEDURE — 99214 OFFICE O/P EST MOD 30 MIN: CPT | Mod: HCNC,25,S$GLB, | Performed by: FAMILY MEDICINE

## 2020-10-26 PROCEDURE — 3008F PR BODY MASS INDEX (BMI) DOCUMENTED: ICD-10-PCS | Mod: HCNC,CPTII,S$GLB, | Performed by: FAMILY MEDICINE

## 2020-10-26 PROCEDURE — 99214 PR OFFICE/OUTPT VISIT, EST, LEVL IV, 30-39 MIN: ICD-10-PCS | Mod: HCNC,25,S$GLB, | Performed by: FAMILY MEDICINE

## 2020-10-26 PROCEDURE — 3078F PR MOST RECENT DIASTOLIC BLOOD PRESSURE < 80 MM HG: ICD-10-PCS | Mod: HCNC,CPTII,S$GLB, | Performed by: FAMILY MEDICINE

## 2020-10-26 PROCEDURE — 94640 PR INHAL RX, AIRWAY OBST/DX SPUTUM INDUCT: ICD-10-PCS | Mod: HCNC,S$GLB,, | Performed by: FAMILY MEDICINE

## 2020-10-26 PROCEDURE — 3075F SYST BP GE 130 - 139MM HG: CPT | Mod: HCNC,CPTII,S$GLB, | Performed by: FAMILY MEDICINE

## 2020-10-26 PROCEDURE — 3078F DIAST BP <80 MM HG: CPT | Mod: HCNC,CPTII,S$GLB, | Performed by: FAMILY MEDICINE

## 2020-10-26 PROCEDURE — 99999 PR PBB SHADOW E&M-EST. PATIENT-LVL III: ICD-10-PCS | Mod: PBBFAC,HCNC,, | Performed by: FAMILY MEDICINE

## 2020-10-26 PROCEDURE — 99999 PR PBB SHADOW E&M-EST. PATIENT-LVL III: CPT | Mod: PBBFAC,HCNC,, | Performed by: FAMILY MEDICINE

## 2020-10-26 PROCEDURE — 94640 AIRWAY INHALATION TREATMENT: CPT | Mod: HCNC,S$GLB,, | Performed by: FAMILY MEDICINE

## 2020-10-26 PROCEDURE — 3075F PR MOST RECENT SYSTOLIC BLOOD PRESS GE 130-139MM HG: ICD-10-PCS | Mod: HCNC,CPTII,S$GLB, | Performed by: FAMILY MEDICINE

## 2020-10-26 RX ORDER — IPRATROPIUM BROMIDE AND ALBUTEROL SULFATE 2.5; .5 MG/3ML; MG/3ML
3 SOLUTION RESPIRATORY (INHALATION)
Status: COMPLETED | OUTPATIENT
Start: 2020-10-26 | End: 2020-10-26

## 2020-10-26 RX ORDER — PREDNISONE 20 MG/1
40 TABLET ORAL DAILY
Qty: 10 TABLET | Refills: 0 | Status: SHIPPED | OUTPATIENT
Start: 2020-10-26 | End: 2020-10-31

## 2020-10-26 RX ORDER — MONTELUKAST SODIUM 10 MG/1
10 TABLET ORAL NIGHTLY
Qty: 30 TABLET | Refills: 3 | Status: SHIPPED | OUTPATIENT
Start: 2020-10-26 | End: 2021-03-05 | Stop reason: SDUPTHER

## 2020-10-26 RX ORDER — AMOXICILLIN AND CLAVULANATE POTASSIUM 875; 125 MG/1; MG/1
1 TABLET, FILM COATED ORAL 2 TIMES DAILY
Qty: 20 TABLET | Refills: 0 | Status: SHIPPED | OUTPATIENT
Start: 2020-10-26 | End: 2020-11-05

## 2020-10-26 RX ADMIN — IPRATROPIUM BROMIDE AND ALBUTEROL SULFATE 3 ML: 2.5; .5 SOLUTION RESPIRATORY (INHALATION) at 02:10

## 2020-10-26 NOTE — PROGRESS NOTES
Routine Office Visit    Patient Name: Yanni Kaiser    : 1970  MRN: [id number]    Subjective:  Yanni is a 50 y.o. female who presents today for:    1. Congestion and cough  Patient presenting today with congestion and cough x 4 weeks.  She states that she has drainage and coughing it up.  She continues to smoke.  There has been no fevers, but does have recurring chills that have bene ongoing for a long time.  No loss of taste or smell.  Was treated on 10/8 with zpak and solumedrol, but states that did not help for long..  She has been told numerous times that by her continuing to smoke puts her life at risk as she is asthmatic and has been admitted to ICU multiple times of COPD exacerbations.      Past Medical History  Past Medical History:   Diagnosis Date    Allergy     Anemia     Asthma     Back pain     Chronic bronchitis     Cigarette smoker     DDD (degenerative disc disease), lumbar 2020    Depression     Diabetes with neurologic complications     DUB (dysfunctional uterine bleeding) 10/16/2018    GERD (gastroesophageal reflux disease)     High cholesterol     Hyperprolactinemia     Hypertension     Influenza A 2020    Obese body habitus     Obesity     Pseudotumor cerebri     Renal manifestation of secondary diabetes mellitus     Respiratory failure     Seizures     Simple endometrial hyperplasia 2018    Sleep apnea     Smoker     Tobacco dependence     Urinary incontinence        Past Surgical History  Past Surgical History:   Procedure Laterality Date    BREAST CYST ASPIRATION       SECTION      X 1    CHOLECYSTECTOMY      COLONOSCOPY      COLONOSCOPY N/A 2019    Procedure: COLONOSCOPY;  Surgeon: Jagruti Sweeney MD;  Location: [address]);  Service: Endoscopy;  Laterality: N/A;  BMI is 63/gastroparesis/3 days full liquid diet 1 day clear liquid see telephone encounter by Ciara limon    EPIDURAL STEROID INJECTION N/A  9/11/2020    Procedure: INJECTION, STEROID, EPIDURAL, L5-S1 IL;  Surgeon: Lawrence Marin MD;  Location: [facility] PAIN MGT;  Service: Pain Management;  Laterality: N/A;    ESOPHAGEAL MANOMETRY WITH MEASUREMENT OF IMPEDANCE N/A 11/5/2019    Procedure: MANOMETRY-ESOPHAGEAL-WITH IMPEDANCE;  Surgeon: Jagruti Sweeney MD;  Location: [facility] ENDO (2ND FLR);  Service: Endoscopy;  Laterality: N/A;  Hold Narcotics x 1 days   Hold TCA x 1 days  Propofol only. No fentanyl or benzodiazepine during sedation. If additional sedation needed, discuss with Dr. Sweeney.  10/29 - pt confirmed appt    ESOPHAGOGASTRODUODENOSCOPY N/A 1/14/2019    Procedure: EGD (ESOPHAGOGASTRODUODENOSCOPY);  Surgeon: Jagruti Sweeney MD;  Location: [facility] (2ND FLR);  Service: Endoscopy;  Laterality: N/A;  BMI is 63/gastroparesis/3 days full liquid diet 1 day clear liquid see telephone encounter by Ciara limon    ESOPHAGOGASTRODUODENOSCOPY N/A 11/5/2019    Procedure: ESOPHAGOGASTRODUODENOSCOPY (EGD);  Surgeon: Jagruti Sweeney MD;  Location: [facility] (2ND FLR);  Service: Endoscopy;  Laterality: N/A;  EGD with EndoFlip /+/- Botox  2nd floor for gastroparesis/BMI 63 **367lbs**  Bariatric Stretcher needed  Manometry probe to be placed endoscopically during EGD procedure  Due to change in protocol, Full liquid diet for 3 days/ 1 day of clear liquids  Hi    FOOT FRACTURE SURGERY Left     HYSTEROSCOPY WITH DILATION AND CURETTAGE OF UTERUS N/A 10/16/2018    [facility]    INJECTION OF ANESTHETIC AGENT AROUND NERVE Bilateral 10/23/2020    Procedure: BLOCK, NERVE  bilateral L3,4,5 MBB;  Surgeon: Lawrence Marin MD;  Location: [facility] PAIN MGT;  Service: Pain Management;  Laterality: Bilateral;  bilateral L3,4,5 MBB   consent needed    PLANTAR FASCIOTOMY Left 11/18/2019    Procedure: FASCIOTOMY, PLANTAR;  Surgeon: Valentino Orourke DPM;  Location: [facility] OR 2ND FLR;  Service: Podiatry;  Laterality: Left;    RETINAL LASER PROCEDURE Left     SINUS SURGERY       TRANSFORAMINAL EPIDURAL INJECTION OF STEROID Bilateral 6/24/2020    Procedure: INJECTION, STEROID, EPIDURAL, TRANSFORAMINAL APPROACH;  Surgeon: Lawrence Marin MD;  Location: [facility] PAIN MGT;  Service: Pain Management;  Laterality: Bilateral;    UPPER GASTROINTESTINAL ENDOSCOPY         Family History  Family History   Problem Relation Age of Onset    Hypertension Mother     Diabetes Mother     Colon cancer Mother 50    Colon polyps Mother     Heart disease Father     COPD Father     Heart attack Father     Hypertension Father     Ulcers Father     Cancer Maternal Grandmother     Breast cancer Maternal Grandmother     Colon cancer Maternal Grandmother     Colon polyps Maternal Grandmother     No Known Problems Paternal Grandmother     No Known Problems Brother     No Known Problems Maternal Aunt     No Known Problems Maternal Uncle     No Known Problems Paternal Aunt     No Known Problems Paternal Uncle     No Known Problems Maternal Grandfather     No Known Problems Paternal Grandfather     Celiac disease Neg Hx     Cirrhosis Neg Hx     Crohn's disease Neg Hx     Cystic fibrosis Neg Hx     Esophageal cancer Neg Hx     Hemochromatosis Neg Hx     Inflammatory bowel disease Neg Hx     Irritable bowel syndrome Neg Hx     Liver cancer Neg Hx     Liver disease Neg Hx     Rectal cancer Neg Hx     Stomach cancer Neg Hx     Ulcerative colitis Neg Hx     Jonnie's disease Neg Hx     Tuberculosis Neg Hx     Lymphoma Neg Hx     Scleroderma Neg Hx     Rheum arthritis Neg Hx     Melanoma Neg Hx     Multiple sclerosis Neg Hx     Psoriasis Neg Hx     Lupus Neg Hx     Skin cancer Neg Hx     Amblyopia Neg Hx     Blindness Neg Hx     Cataracts Neg Hx     Glaucoma Neg Hx     Macular degeneration Neg Hx     Retinal detachment Neg Hx     Strabismus Neg Hx     Stroke Neg Hx     Thyroid disease Neg Hx        Social History  Social History     Socioeconomic History    Marital status:       Spouse name: Not on file    Number of children: Not on file    Years of education: Not on file    Highest education level: Not on file   Occupational History    Not on file   Social Needs    Financial resource strain: Very hard    Food insecurity     Worry: Often true     Inability: Sometimes true    Transportation needs     Medical: Yes     Non-medical: Yes   Tobacco Use    Smoking status: Current Every Day Smoker     Packs/day: 1.00     Years: 27.00     Pack years: 27.00     Types: Cigarettes     Start date: 1/1/1992    Smokeless tobacco: Never Used    Tobacco comment: 1/2 a pack if her days are good   Substance and Sexual Activity    Alcohol use: Not Currently     Alcohol/week: 0.0 standard drinks     Frequency: Never     Drinks per session: Patient refused     Binge frequency: Never     Comment: socially    Drug use: No    Sexual activity: Yes     Partners: Male     Birth control/protection: None     Comment:    Lifestyle    Physical activity     Days per week: 0 days     Minutes per session: 0 min    Stress: Very much   Relationships    Social connections     Talks on phone: Once a week     Gets together: Never     Attends [employer] service: More than 4 times per year     Active member of club or organization: Yes     Attends meetings of clubs or organizations: More than 4 times per year     Relationship status:    Other Topics Concern    Not on file   Social History Narrative    Not on file       Current Medications  Current Outpatient Medications on File Prior to Visit   Medication Sig Dispense Refill    albuterol (PROVENTIL/VENTOLIN HFA) 90 mcg/actuation inhaler Inhale 2 puffs into the lungs every 6 (six) hours as needed. Rescue 54 g 0    albuterol-ipratropium (DUO-NEB) 2.5 mg-0.5 mg/3 mL nebulizer solution Take 3 mLs by nebulization every 6 (six) hours as needed for Wheezing or Shortness of Breath. Rescue 100 mL 3    atorvastatin (LIPITOR) 40 MG tablet Take 1  tablet (40 mg total) by mouth once daily. 90 tablet 3    baclofen (LIORESAL) 10 MG tablet Take 1 tablet (10 mg total) by mouth 3 (three) times daily. 90 tablet 0    blood sugar diagnostic Strp 1 each by Misc.(Non-Drug; Combo Route) route 4 (four) times daily before meals and nightly. ACCU CHEK ELVIA PLUS METER 200 each 3    blood-glucose meter (ACCU-CHEK ELVIA PLUS METER) [facility] TEST FOUR TIMES DAILY BEFORE MEALS AND EVERY NIGHT 90 each 1    budesonide-formoterol 160-4.5 mcg (SYMBICORT) 160-4.5 mcg/actuation HFAA Inhale 2 puffs into the lungs every 12 (twelve) hours. Controller 1 Inhaler 5    DULoxetine (CYMBALTA) 30 MG capsule Take 1 capsule (30 mg total) by mouth once daily. 30 capsule 3    galcanezumab-gnlm (EMGALITY PEN) 120 mg/mL PnIj Inject 120 mg into the skin every 28 days. 1 mL 5    lancets (ACCU-CHEK SOFTCLIX LANCETS) Misc 1 Device by Misc.(Non-Drug; Combo Route) route 4 (four) times daily. 200 each 3    levocetirizine (XYZAL) 5 MG tablet Take 1 tablet (5 mg total) by mouth every evening. 90 tablet 0    lidocaine (LIDODERM) 5 % Place 1 patch onto the skin once daily. Remove & Discard patch within 12 hours or as directed by MD 15 patch 0    losartan (COZAAR) 100 MG tablet TAKE 1 TABLET(100 MG) BY MOUTH EVERY DAY 90 tablet 0    ondansetron (ZOFRAN) 8 MG tablet Take 1 tablet (8 mg total) by mouth every 8 (eight) hours as needed for Nausea. 90 tablet 11    oxyCODONE-acetaminophen (PERCOCET)  mg per tablet Take 1 tablet by mouth every 12 (twelve) hours as needed for Pain. 40 tablet 0    pantoprazole (PROTONIX) 40 MG tablet Take 40 mg by mouth once daily.   3    QUEtiapine (SEROQUEL) 25 MG Tab Take 1 tablet (25 mg total) by mouth once daily. 90 tablet 0    semaglutide (OZEMPIC) 1 mg/dose (2 mg/1.5 mL) PnIj Inject 1 mg subq weekly. 9 mL 1    sumatriptan (IMITREX) 50 MG tablet Take 1 tab at onset of headaches.  If no improvement in 2 hours, take another.  Do not take more than 2 tabs in 24  hours. 9 tablet 5    topiramate (TROKENDI XR) 200 mg Cp24 Take 600 mg by mouth once daily. 90 capsule 1     No current facility-administered medications on file prior to visit.        Allergies   Review of patient's allergies indicates:   Allergen Reactions    Gabapentin      Makes her twitch       Review of Systems (Pertinent positives)  Review of Systems   Constitutional: Negative for fever.   HENT: Negative for ear pain and sore throat.    Respiratory: Positive for sputum production, shortness of breath and wheezing. Negative for hemoptysis.    Cardiovascular: Positive for orthopnea, claudication, leg swelling and PND. Negative for chest pain.   Gastrointestinal: Negative for vomiting.   Musculoskeletal: Positive for neck pain.   Skin: Negative for rash.   Neurological: Positive for headaches.         /78 (BP Location: Right arm, Patient Position: Sitting, BP Method: Large (Automatic))   Pulse 81   Temp 98.1 °F (36.7 °C) (Oral)   Resp 17   Wt (!) 149.7 kg (330 lb 0.5 oz)   SpO2 97%   BMI 56.65 kg/m²     GENERAL APPEARANCE: in no apparent distress and well developed and well nourished  HEENT: PERRL, EOMI, Sclera clear, anicteric, Oropharynx clear, no lesions, Neck supple with midline trachea  NECK: normal, supple, no adenopathy, thyroid normal in size  RESPIRATORY: appears well, vitals normal, no respiratory distress, acyanotic, normal RR, decreased air entry bilaterally with diffuse mild wheezing; no crackles  HEART: regular rate and rhythm, S1, S2 normal, no murmur, click, rub or gallop.    ABDOMEN: abdomen is soft without tenderness, no masses, no hernias, no organomegaly, no rebound, no guarding. Suprapubic tenderness absent. No CVA tenderness.  SKIN: no rashes, no wounds, no other lesions  PSYCH: Alert, oriented x 3, thought content appropriate, speech normal, pleasant and cooperative, good eye contact, well groomed    Assessment/Plan:  Yanni Kaiser is a 50 y.o. female who presents  today for :    Yanni was seen today for chest congestion and wheezing.    Diagnoses and all orders for this visit:    Acute exacerbation of COPD with asthma  -     albuterol-ipratropium 2.5 mg-0.5 mg/3 mL nebulizer solution 3 mL  -     amoxicillin-clavulanate 875-125mg (AUGMENTIN) 875-125 mg per tablet; Take 1 tablet by mouth 2 (two) times daily. for 10 days  -     predniSONE (DELTASONE) 20 MG tablet; Take 2 tablets (40 mg total) by mouth once daily. for 5 days  -     X-Ray Chest PA And Lateral; Future    Seasonal allergies  -     montelukast (SINGULAIR) 10 mg tablet; Take 1 tablet (10 mg total) by mouth every evening.      1.  Will treat as copd exacerbation as well as exacerbation of environmental allergies  2.  Call with any concerns  3.  Go to the ED for any worsening symptoms  4.  Improved with duoneb  5.  Follow up as needed  6.  She is to monitor blood sugars after getting steroid shot and notify me if they get over 200mg/dl        Carlyle Gordillo MD

## 2020-10-26 NOTE — PROGRESS NOTES
Pt name and  verified. Allergies reviewed. Administered Duoneb treatment to pt. Pt tolerated well.

## 2020-10-27 DIAGNOSIS — G43.719 INTRACTABLE CHRONIC MIGRAINE WITHOUT AURA AND WITHOUT STATUS MIGRAINOSUS: ICD-10-CM

## 2020-10-28 RX ORDER — TOPIRAMATE 200 MG/1
600 CAPSULE, EXTENDED RELEASE ORAL DAILY
Qty: 90 CAPSULE | Refills: 1 | Status: SHIPPED | OUTPATIENT
Start: 2020-10-28 | End: 2021-01-20 | Stop reason: SDUPTHER

## 2020-10-30 RX ORDER — OXYCODONE AND ACETAMINOPHEN 10; 325 MG/1; MG/1
1 TABLET ORAL EVERY 12 HOURS PRN
Qty: 40 TABLET | Refills: 0 | OUTPATIENT
Start: 2020-10-30

## 2020-11-03 ENCOUNTER — CLINICAL SUPPORT (OUTPATIENT)
Dept: GASTROENTEROLOGY | Facility: CLINIC | Age: 50
End: 2020-11-03
Payer: MEDICARE

## 2020-11-03 DIAGNOSIS — E78.2 MIXED HYPERLIPIDEMIA: Chronic | ICD-10-CM

## 2020-11-03 DIAGNOSIS — K21.9 GASTROESOPHAGEAL REFLUX DISEASE WITHOUT ESOPHAGITIS: ICD-10-CM

## 2020-11-03 DIAGNOSIS — Z79.4 TYPE 2 DIABETES MELLITUS WITH STAGE 3 CHRONIC KIDNEY DISEASE, WITH LONG-TERM CURRENT USE OF INSULIN, UNSPECIFIED WHETHER STAGE 3A OR 3B CKD: Primary | ICD-10-CM

## 2020-11-03 DIAGNOSIS — E11.22 TYPE 2 DIABETES MELLITUS WITH STAGE 3 CHRONIC KIDNEY DISEASE, WITH LONG-TERM CURRENT USE OF INSULIN, UNSPECIFIED WHETHER STAGE 3A OR 3B CKD: Primary | ICD-10-CM

## 2020-11-03 DIAGNOSIS — N18.30 TYPE 2 DIABETES MELLITUS WITH STAGE 3 CHRONIC KIDNEY DISEASE, WITH LONG-TERM CURRENT USE OF INSULIN, UNSPECIFIED WHETHER STAGE 3A OR 3B CKD: Primary | ICD-10-CM

## 2020-11-03 DIAGNOSIS — R13.10 DYSPHAGIA, UNSPECIFIED TYPE: ICD-10-CM

## 2020-11-03 DIAGNOSIS — E66.01 MORBID OBESITY WITH BMI OF 50.0-59.9, ADULT: ICD-10-CM

## 2020-11-03 PROCEDURE — 99499 NO LOS: ICD-10-PCS | Mod: HCNC,95,, | Performed by: DIETITIAN, REGISTERED

## 2020-11-03 PROCEDURE — 99499 UNLISTED E&M SERVICE: CPT | Mod: HCNC,95,, | Performed by: DIETITIAN, REGISTERED

## 2020-11-03 NOTE — PROGRESS NOTES
Virtual Visit   The patient location is: home   The chief complaint leading to consultation is: Gastroparesis   Visit type: Virtual visit with synchronous audio and video  Total time spent with patient: 30 minutes ( patient with migraine and 15 minutes delayed in joining session)   Each patient to whom he or she provides medical services by telemedicine is:  (1) informed of the relationship between the physician and patient and the respective role of any other health care provider with respect to management of the patient; and (2) notified that he or she may decline to receive medical services by telemedicine and may withdraw from such care at any time.    Notes: patient seen multiple times in 2018 for education     GI NUTRITIONAL ASSESSMENT  Referring Provider: Dr. Amy Liao Efren Kasier is a 50 y.o. female referred regarding the following problems:  Reason for Visit: Nutrition assessment and recommendations related to:   Chief Complaint   Patient presents with    Diabetes Mellitus    Gastroparesis     Consumes some high fat products croissants as sandwiches at end of day as only meal     Chronic Constipation    Morbid Obesity     Prescribed Ozempic through Dr Salcedo        Symptoms: Fullness , Constipation, nausea  ( currently prescribed Ozempic for weight loss )    Patient Nutrition Goals :   Weight gain /loss with better food choices       Medical/Surgical History  Pertinent Social History:  Social History     Tobacco Use    Smoking status: Current Every Day Smoker     Packs/day: 1.00     Years: 27.00     Pack years: 27.00     Types: Cigarettes     Start date: 1/1/1992    Smokeless tobacco: Never Used    Tobacco comment: 1/2 a pack if her days are good   Substance Use Topics    Alcohol use: Not Currently     Alcohol/week: 0.0 standard drinks     Frequency: Never     Drinks per session: Patient refused     Binge frequency: Never     Comment: socially    Drug use: No        Previous  Medical History:  Past Medical History:   Diagnosis Date    Allergy     Anemia     Asthma     Back pain     Chronic bronchitis     Cigarette smoker     DDD (degenerative disc disease), lumbar 2020    Depression     Diabetes with neurologic complications     DUB (dysfunctional uterine bleeding) 10/16/2018    GERD (gastroesophageal reflux disease)     High cholesterol     Hyperprolactinemia     Hypertension     Influenza A 2020    Obese body habitus     Obesity     Pseudotumor cerebri     Renal manifestation of secondary diabetes mellitus     Respiratory failure     Seizures     Simple endometrial hyperplasia     Sleep apnea     Smoker     Tobacco dependence     Urinary incontinence        Previous Surgical History:  Past Surgical History:   Procedure Laterality Date    BREAST CYST ASPIRATION       SECTION      X 1    CHOLECYSTECTOMY      COLONOSCOPY      COLONOSCOPY N/A 2019    Procedure: COLONOSCOPY;  Surgeon: Jagruti Sweeney MD;  Location: [facility] ([address] [place] [address]);  Service: Endoscopy;  Laterality: N/A;  BMI is 63/gastroparesis/3 days full liquid diet 1 day clear liquid see telephone encounter by Ciara limon    EPIDURAL STEROID INJECTION N/A 2020    Procedure: INJECTION, STEROID, EPIDURAL, L5-S1 IL;  Surgeon: Lawrence Marin MD;  Location: [facility];  Service: Pain Management;  Laterality: N/A;    ESOPHAGEAL MANOMETRY WITH MEASUREMENT OF IMPEDANCE N/A 2019    Procedure: MANOMETRY-ESOPHAGEAL-WITH IMPEDANCE;  Surgeon: Jagruti Sweeney MD;  Location: [facility] ([address] [place] [address]);  Service: Endoscopy;  Laterality: N/A;  Hold Narcotics x 1 days   Hold TCA x 1 days  Propofol only. No fentanyl or benzodiazepine during sedation. If additional sedation needed, discuss with Dr. Sweeney.  10/29 - pt confirmed appt    ESOPHAGOGASTRODUODENOSCOPY N/A 2019    Procedure: EGD (ESOPHAGOGASTRODUODENOSCOPY);  Surgeon: Jagruti wSeeney MD;  Location: [facility]  "(2ND FLR);  Service: Endoscopy;  Laterality: N/A;  BMI is 63/gastroparesis/3 days full liquid diet 1 day clear liquid see telephone encounter by Ciara limon    ESOPHAGOGASTRODUODENOSCOPY N/A 11/5/2019    Procedure: ESOPHAGOGASTRODUODENOSCOPY (EGD);  Surgeon: Jagruti Sweeney MD;  Location: [facility] (2ND FLR);  Service: Endoscopy;  Laterality: N/A;  EGD with EndoFlip /+/- Botox  2nd floor for gastroparesis/BMI 63 **367lbs**  Bariatric Stretcher needed  Manometry probe to be placed endoscopically during EGD procedure  Due to change in protocol, Full liquid diet for 3 days/ 1 day of clear liquids  Hi    FOOT FRACTURE SURGERY Left     HYSTEROSCOPY WITH DILATION AND CURETTAGE OF UTERUS N/A 10/16/2018    KJB    INJECTION OF ANESTHETIC AGENT AROUND NERVE Bilateral 10/23/2020    Procedure: BLOCK, NERVE  bilateral L3,4,5 MBB;  Surgeon: Lawrence Marin MD;  Location: [facility]IN [facility];  Service: Pain Management;  Laterality: Bilateral;  bilateral L3,4,5 MBB   consent needed    PLANTAR FASCIOTOMY Left 11/18/2019    Procedure: FASCIOTOMY, PLANTAR;  Surgeon: Valentino Orourke DPM;  Location: [facility] OR 2ND FLR;  Service: Podiatry;  Laterality: Left;    RETINAL LASER PROCEDURE Left     SINUS SURGERY      TRANSFORAMINAL EPIDURAL INJECTION OF STEROID Bilateral 6/24/2020    Procedure: INJECTION, STEROID, EPIDURAL, TRANSFORAMINAL APPROACH;  Surgeon: Lawrence Marin MD;  Location: [facility]IN [facility];  Service: Pain Management;  Laterality: Bilateral;    UPPER GASTROINTESTINAL ENDOSCOPY        Anthropometric Data Usual Weight:   has been stable  Estimated body mass index is 56.65 kg/m² as calculated from the following:    Height as of 10/23/20: 5' 4" (1.626 m).    Weight as of 10/26/20: 149.7 kg (330 lb 0.5 oz).  Weight History:  Wt Readings from Last 12 Encounters:   10/26/20 (!) 149.7 kg (330 lb 0.5 oz)   10/23/20 (!) 145.2 kg (320 lb)   10/08/20 (!) 148.6 kg (327 lb 9.7 oz)   09/22/20 (!) 149.2 kg (328 lb 14.8 oz) "   09/16/20 (!) 149.1 kg (328 lb 11.3 oz)   09/11/20 (!) 147.9 kg (326 lb)   08/26/20 (!) 148 kg (326 lb 4.5 oz)   08/25/20 (!) 145.6 kg (321 lb)   08/19/20 (!) 148 kg (326 lb 4.5 oz)   08/10/20 (!) 150 kg (330 lb 11 oz)   08/03/20 (!) 150 kg (330 lb 11 oz)   07/22/20 (!) 150 kg (330 lb 11 oz)   ]  BMI: There is no height or weight on file to calculate BMI.  Calculated calorie needs :   Calculated Protein needs :  Nutrition Focused Physical Findings:   Unable to assess via video    Meds  and Biochemical Data   Labs  @BRIEFLAB(GLU,K,PHOS,MG,CHOL,HDL,LDL,TRIG,ALBUMIN,PREALBUMIN,AMMONIA,HGBA1C,CALCIUM)  Lab Results   Component Value Date    WBC 4.88 05/29/2020    HGB 12.2 05/29/2020    HCT 38.5 05/29/2020    MCV 97 05/29/2020     05/29/2020     Lab Results   Component Value Date    FERRITIN 8 (L) 03/03/2020     Lab Results   Component Value Date     06/12/2020    K 4.4 06/12/2020     (H) 06/12/2020    CO2 25 06/12/2020    GLU 89 06/12/2020    BUN 13 06/12/2020    CREATININE 1.3 06/12/2020    CALCIUM 8.4 (L) 06/12/2020    PROT 6.7 06/12/2020    ALBUMIN 3.2 (L) 06/12/2020    BILITOT 0.2 06/12/2020    ALKPHOS 58 06/12/2020    AST 9 (L) 06/12/2020    ALT 9 (L) 06/12/2020     Lab Results   Component Value Date    TSH 1.229 11/05/2018     Lab Results   Component Value Date    CRP 29.2 (H) 05/20/2020     Phosphorus   Date Value Ref Range Status   07/30/2016 3.5 2.7 - 4.5 mg/dL Final   06/11/2015 2.1 (L) 2.7 - 4.5 mg/dL Final     Prealbumin   Date Value Ref Range Status   05/17/2019 14 (L) 20 - 43 mg/dL Final     Cholesterol   Date Value Ref Range Status   06/12/2020 223 (H) 120 - 199 mg/dL Final     Comment:     The National Cholesterol Education Program (NCEP) has set the  following guidelines (reference ranges) for Cholesterol:  Optimal.....................<200 mg/dL  Borderline High.............200-239 mg/dL  High........................> or = 240 mg/dL     01/30/2019 221 (H) 120 - 199 mg/dL Final      Comment:     The National Cholesterol Education Program (NCEP) has set the  following guidelines (reference ranges) for Cholesterol:  Optimal.....................<200 mg/dL  Borderline High.............200-239 mg/dL  High........................> or = 240 mg/dL       Iron   Date Value Ref Range Status   03/03/2020 48 30 - 160 ug/dL Final   05/17/2019 36 30 - 160 ug/dL Final     Folate   Date Value Ref Range Status   09/03/2014 8.2 4.0 - 24.0 ng/mL Final     No components found for: [id number]  No components found for: ROGWUKRH80  No components found for: VITAMIND2  No components found for: VITAMIND    Lab Results   Component Value Date    FERRITIN 8 (L) 03/03/2020     Lab Results   Component Value Date    CRP 29.2 (H) 05/20/2020     Lab Results   Component Value Date    SEDRATE 53 (H) 05/20/2020     Assessment of Lab Values:   Medication:  Current Outpatient Medications   Medication Sig    albuterol (PROVENTIL/VENTOLIN HFA) 90 mcg/actuation inhaler Inhale 2 puffs into the lungs every 6 (six) hours as needed. Rescue    albuterol-ipratropium (DUO-NEB) 2.5 mg-0.5 mg/3 mL nebulizer solution Take 3 mLs by nebulization every 6 (six) hours as needed for Wheezing or Shortness of Breath. Rescue    amoxicillin-clavulanate 875-125mg (AUGMENTIN) 875-125 mg per tablet Take 1 tablet by mouth 2 (two) times daily. for 10 days    atorvastatin (LIPITOR) 40 MG tablet Take 1 tablet (40 mg total) by mouth once daily.    baclofen (LIORESAL) 10 MG tablet Take 1 tablet (10 mg total) by mouth 3 (three) times daily.    blood sugar diagnostic Strp 1 each by Misc.(Non-Drug; Combo Route) route 4 (four) times daily before meals and nightly. ACCU CHEK ELVIA PLUS METER    blood-glucose meter (ACCU-CHEK ELVIA PLUS METER) Misc TEST FOUR TIMES DAILY BEFORE MEALS AND EVERY NIGHT    budesonide-formoterol 160-4.5 mcg (SYMBICORT) 160-4.5 mcg/actuation HFAA Inhale 2 puffs into the lungs every 12 (twelve) hours. Controller    DULoxetine (CYMBALTA)  30 MG capsule Take 1 capsule (30 mg total) by mouth once daily.    galcanezumab-gnlm (EMGALITY PEN) 120 mg/mL PnIj Inject 120 mg into the skin every 28 days.    lancets (ACCU-CHEK SOFTCLIX LANCETS) Misc 1 Device by Misc.(Non-Drug; Combo Route) route 4 (four) times daily.    levocetirizine (XYZAL) 5 MG tablet Take 1 tablet (5 mg total) by mouth every evening.    lidocaine (LIDODERM) 5 % Place 1 patch onto the skin once daily. Remove & Discard patch within 12 hours or as directed by MD    losartan (COZAAR) 100 MG tablet TAKE 1 TABLET(100 MG) BY MOUTH EVERY DAY    montelukast (SINGULAIR) 10 mg tablet Take 1 tablet (10 mg total) by mouth every evening.    ondansetron (ZOFRAN) 8 MG tablet Take 1 tablet (8 mg total) by mouth every 8 (eight) hours as needed for Nausea.    oxyCODONE-acetaminophen (PERCOCET)  mg per tablet Take 1 tablet by mouth every 12 (twelve) hours as needed for Pain.    pantoprazole (PROTONIX) 40 MG tablet Take 40 mg by mouth once daily.     QUEtiapine (SEROQUEL) 25 MG Tab Take 1 tablet (25 mg total) by mouth once daily.    semaglutide (OZEMPIC) 1 mg/dose (2 mg/1.5 mL) PnIj Inject 1 mg subq weekly.    sumatriptan (IMITREX) 50 MG tablet Take 1 tab at onset of headaches.  If no improvement in 2 hours, take another.  Do not take more than 2 tabs in 24 hours.    topiramate (TROKENDI XR) 200 mg Cp24 Take 600 mg by mouth once daily.     No current facility-administered medications for this visit.      Nutritionally significant meds:  Vitamin/Supplements/Herbs:   Potential Food drug Interaction:   Allergies:  Review of patient's allergies indicates:   Allergen Reactions    Gabapentin      Makes her twitch      Dietary Data  Current Diet: Notes continued difficulty swallowing low moisture foods ( sandwiches, bread, rice , meat) ; using Sprite, sweetened tea and coffee as primary liquids ; continued nausea   Diet Recall: reports nausea irrespective of foods consumed   Meal patterns: 1-2 meals  daily  Fluid Intake /quantity:   Fruit: decreased volume  Vegetables: decreased volume  Meal preparation/shopping: self  Dining out: none   Foods poorly tolerated : low moisture foods   Milk tolerance: poor    Fiber tolerance : consuming fewer foods with fiber,, consuming more processed grains to facilitate emptying   Nuts/seeds: avoids   Fat tolerance : Croissants, etc continue to have an appeal despite the fact that they worsen bloating/nausea   Sugars and desserts; sweet tea primary sweetened product   Supplements/ MVI: MVI   Review of patient's allergies indicates:   Allergen Reactions    Gabapentin      Makes her twitch      Nutrition Diagnosis: Altered GI Function related to Gastroparesis with bloating related to consumption of processed cho and sugars .    Goals/Recommendations: Gastroparesis with bloating ... may have SIBO ... difficult to restrict/eliminate processed cho as they are her favorites and seem to empty quicker than whole grains   Education Provided by email : Gastro[aresis , SIBO guidelines to see if reduction in simple sugars helps   Patient and/or family comprehend instructions: yes  Outcome: Verbalizes understanding  Monitoring: bloating frequency with decreased sugar, starch intake , increased lean protein , soups with cooked vegetables ; use of crock pot or  to improve digestibilty of foods .  Follow-up: 2 months  Counseling Time: 30 minutes

## 2020-11-05 ENCOUNTER — PATIENT OUTREACH (OUTPATIENT)
Dept: ADMINISTRATIVE | Facility: OTHER | Age: 50
End: 2020-11-05

## 2020-11-06 ENCOUNTER — PATIENT MESSAGE (OUTPATIENT)
Dept: PHYSICAL MEDICINE AND REHAB | Facility: CLINIC | Age: 50
End: 2020-11-06

## 2020-11-06 ENCOUNTER — OFFICE VISIT (OUTPATIENT)
Dept: PHYSICAL MEDICINE AND REHAB | Facility: CLINIC | Age: 50
End: 2020-11-06
Payer: MEDICARE

## 2020-11-06 VITALS
SYSTOLIC BLOOD PRESSURE: 143 MMHG | HEART RATE: 70 BPM | WEIGHT: 293 LBS | HEIGHT: 64 IN | BODY MASS INDEX: 50.02 KG/M2 | DIASTOLIC BLOOD PRESSURE: 87 MMHG

## 2020-11-06 DIAGNOSIS — M47.26 OSTEOARTHRITIS OF SPINE WITH RADICULOPATHY, LUMBAR REGION: ICD-10-CM

## 2020-11-06 DIAGNOSIS — G89.29 CHRONIC MIDLINE LOW BACK PAIN WITH BILATERAL SCIATICA: ICD-10-CM

## 2020-11-06 DIAGNOSIS — M79.7 FIBROMYALGIA: ICD-10-CM

## 2020-11-06 DIAGNOSIS — E66.01 MORBID OBESITY WITH BMI OF 50.0-59.9, ADULT: ICD-10-CM

## 2020-11-06 DIAGNOSIS — Z79.891 CHRONICALLY ON OPIATE THERAPY: ICD-10-CM

## 2020-11-06 DIAGNOSIS — M48.061 SPINAL STENOSIS OF LUMBAR REGION, UNSPECIFIED WHETHER NEUROGENIC CLAUDICATION PRESENT: ICD-10-CM

## 2020-11-06 DIAGNOSIS — M54.41 CHRONIC MIDLINE LOW BACK PAIN WITH BILATERAL SCIATICA: ICD-10-CM

## 2020-11-06 DIAGNOSIS — M54.42 CHRONIC MIDLINE LOW BACK PAIN WITH BILATERAL SCIATICA: ICD-10-CM

## 2020-11-06 DIAGNOSIS — M79.7 FIBROMYALGIA SYNDROME: Primary | ICD-10-CM

## 2020-11-06 DIAGNOSIS — M48.061 NEURAL FORAMINAL STENOSIS OF LUMBAR SPINE: ICD-10-CM

## 2020-11-06 PROCEDURE — 3077F SYST BP >= 140 MM HG: CPT | Mod: HCNC,CPTII,S$GLB, | Performed by: PHYSICAL MEDICINE & REHABILITATION

## 2020-11-06 PROCEDURE — 3077F PR MOST RECENT SYSTOLIC BLOOD PRESSURE >= 140 MM HG: ICD-10-PCS | Mod: HCNC,CPTII,S$GLB, | Performed by: PHYSICAL MEDICINE & REHABILITATION

## 2020-11-06 PROCEDURE — 99215 OFFICE O/P EST HI 40 MIN: CPT | Mod: HCNC,S$GLB,, | Performed by: PHYSICAL MEDICINE & REHABILITATION

## 2020-11-06 PROCEDURE — 99215 PR OFFICE/OUTPT VISIT, EST, LEVL V, 40-54 MIN: ICD-10-PCS | Mod: HCNC,S$GLB,, | Performed by: PHYSICAL MEDICINE & REHABILITATION

## 2020-11-06 PROCEDURE — 3079F PR MOST RECENT DIASTOLIC BLOOD PRESSURE 80-89 MM HG: ICD-10-PCS | Mod: HCNC,CPTII,S$GLB, | Performed by: PHYSICAL MEDICINE & REHABILITATION

## 2020-11-06 PROCEDURE — 99999 PR PBB SHADOW E&M-EST. PATIENT-LVL III: CPT | Mod: PBBFAC,HCNC,, | Performed by: PHYSICAL MEDICINE & REHABILITATION

## 2020-11-06 PROCEDURE — 99999 PR PBB SHADOW E&M-EST. PATIENT-LVL III: ICD-10-PCS | Mod: PBBFAC,HCNC,, | Performed by: PHYSICAL MEDICINE & REHABILITATION

## 2020-11-06 PROCEDURE — 3079F DIAST BP 80-89 MM HG: CPT | Mod: HCNC,CPTII,S$GLB, | Performed by: PHYSICAL MEDICINE & REHABILITATION

## 2020-11-06 PROCEDURE — 3008F BODY MASS INDEX DOCD: CPT | Mod: HCNC,CPTII,S$GLB, | Performed by: PHYSICAL MEDICINE & REHABILITATION

## 2020-11-06 PROCEDURE — 3008F PR BODY MASS INDEX (BMI) DOCUMENTED: ICD-10-PCS | Mod: HCNC,CPTII,S$GLB, | Performed by: PHYSICAL MEDICINE & REHABILITATION

## 2020-11-06 RX ORDER — OXYCODONE AND ACETAMINOPHEN 7.5; 325 MG/1; MG/1
1 TABLET ORAL 3 TIMES DAILY PRN
Qty: 90 TABLET | Refills: 0 | Status: SHIPPED | OUTPATIENT
Start: 2020-11-06 | End: 2020-12-08 | Stop reason: SDUPTHER

## 2020-11-06 RX ORDER — DULOXETIN HYDROCHLORIDE 60 MG/1
60 CAPSULE, DELAYED RELEASE ORAL DAILY
Qty: 30 CAPSULE | Refills: 3 | Status: SHIPPED | OUTPATIENT
Start: 2020-11-06 | End: 2021-03-03

## 2020-11-06 NOTE — LETTER
November 6, 2020      Elysia Contreras, DO  [address] [facility]  [place] [address]           [facility] [address]  [address]  [place] [address]  Phone: [phone]          Patient: Yanni Kaiser   MR Number: [id number]   YOB: 1970   Date of Visit: 11/6/2020       Dear Dr. Elysia Contreras:    Thank you for referring Yanni Kaiser to me for evaluation. Attached you will find relevant portions of my assessment and plan of care.    If you have questions, please do not hesitate to call me. I look forward to following Yanni Kaiser along with you.    Sincerely,    Saar Negron MD    Enclosure  CC:  No Recipients    If you would like to receive this communication electronically, please contact [email or web address] or [phone] to request more information on [employer] access.    For providers and/or their staff who would like to refer a patient to [facility], please contact us through our one-stop-shop provider referral line, [facility], at [phone].    If you feel you have received this communication in error or would no longer like to receive these types of communications, please e-mail [email or web address]

## 2020-11-06 NOTE — PROGRESS NOTES
Subjective:       Patient ID: Yanni Kaiser is a 50 y.o. female.    Chief Complaint: No chief complaint on file.    HPI     Mrs. Kaiser is a 50 year CKD stage 3, seizure disorder, GERD, fibromyalgia, osteoarthritis and morbid obesity (BMI in the 50s).  She is coming to the clinic for help with management of fibromyalgia and chronic low back pain.    The patient has been complaining of generalized muscle and joint aching, stiffness, fatigue/poor energy and sleep impairment.  She was followed up recently by Rheumatology.  Her tests were negative for inflammatory arthritis.  He was diagnosed with fibromyalgia syndrome.  Her pain is diffuse but worse in her back.    The patient has been complaining of low back pain for few years.  She had intermittent symptoms of sciatica, worse on the left.  She had imaging studies of the lumbar spine showing DJD.  Her last MRI was done on 05/25/2020 and showed multilevel degenerative changes, most prominent at L4-5 with moderate spinal canal stenosis and moderate bilateral neural foraminal stenosis.  The patient has been followed up by Dr. Marin at the pain clinic.  On 06/24/2020 she underwent bilateral L4-L5 transforaminal Epidural Steroid Injection.  On 09/11/2020 she underwent the same procedure.  She reports limited relief.  On 10/23/2020 she underwent bilateral L3, L4 and L5 medial branch block.  She had fair relief lasting for couple days.  She has a follow-up appointment on 11/18/2020, likely for repeat procedure and consideration for RFA.    Currently her back pain is a constant aching and throbbing pain in the lower lumbar spine and across her back.  She has occasional shooting pain to the left foot with numbness and tingling.  He has less frequent/severe symptoms to the right foot.  Her pain is aggravated by prolonged sitting, getting up from a seated position, prolonged standing or walking.  It is better to some extent by lying down.  Her maximum pain is 9-10/10  and minimum 6/10.  Today it is 9/10.  Patient complains of mild left lower extremity weakness.  She denies any bowel or bladder incontinence.  She has occasional leg cramps, mostly at night but at times after ambulating for few blocks, relieved to some extent by leaning forward.    She is currently on Cymbalta 30 mg p.o. q.d..  However she has been out for couple weeks.  Takes baclofen 10 mg p.o. t.i.d. p.r.n. for muscle spasms.  He is on oxycodone/APAP 10/325 p.o. b.i.d. p.r.n..  It is prescribed by Podiatry for bilateral painful feet due to plantar fasciitis.  However she is interested in switching this prescription to our clinic.  She reports in the past hydrocodone/APAP did not help.  He is followed up by Bariatric Medicine for weight loss.      Past Medical History:   Diagnosis Date    Allergy     Anemia     Asthma     Back pain     Chronic bronchitis     Cigarette smoker     DDD (degenerative disc disease), lumbar 6/9/2020    Depression     Diabetes with neurologic complications     DUB (dysfunctional uterine bleeding) 10/16/2018    GERD (gastroesophageal reflux disease)     High cholesterol     Hyperprolactinemia     Hypertension     Influenza A 01/16/2020    Obese body habitus     Obesity     Pseudotumor cerebri     Renal manifestation of secondary diabetes mellitus     Respiratory failure     Seizures     Simple endometrial hyperplasia 2018    Sleep apnea     Smoker     Tobacco dependence     Urinary incontinence        Review of patient's allergies indicates:   Allergen Reactions    Gabapentin      Makes her twitch         Review of Systems   Constitutional: Positive for fatigue. Negative for chills and fever.   HENT: Negative for trouble swallowing.    Eyes: Negative for visual disturbance.   Respiratory: Negative for shortness of breath.    Cardiovascular: Negative for chest pain.   Gastrointestinal: Positive for constipation and nausea. Negative for vomiting.   Endocrine:  Positive for cold intolerance.   Genitourinary: Positive for difficulty urinating.   Musculoskeletal: Negative for arthralgias, back pain, gait problem, joint swelling, neck pain and neck stiffness.   Neurological: Positive for headaches. Negative for dizziness.   Psychiatric/Behavioral: Positive for sleep disturbance. Negative for behavioral problems and dysphoric mood.         Objective:      Physical Exam  Vitals signs reviewed.   Constitutional:       Appearance: She is well-developed.   HENT:      Head: Normocephalic and atraumatic.   Neck:      Comments: +ve tenderness C-spine.  Musculoskeletal:      Comments: BUE:  ROM:   RUE: full.   LUE: full.  Strength:    RUE: 4/5 at shoulder abduction, 5- elbow flexion, 5- elbow extension, 5- hand .   LUE: 4/5 at shoulder abduction, 5- elbow flexion, 5- elbow extension, 5- hand .  Sensation to pinprick:   RUE: intact.   LUE: intact.  DTR:    RUE: +1 biceps, +1 triceps.   LUE:  +1 biceps, +1 triceps.  Salazar:   RUE: -ve.   LUE: -ve.    BLE:  ROM:   RLE: full.   LLE: full.  Knee crepitus:   RLE: -ve.   LLE: -ve.   Strength:    RLE: 4-/5 at hip flexion, 4 knee extension, 4 ankle DF, 4 PF, 4 EHL.   LLE: 4-/5 at hip flexion, 4 knee extension, 4 ankle DF, 4 PF, 4 EHL.  Sensation to pinprick:     RLE: intact.      LLE: intact.   DTR:     RLE: +1 knee, +1 ankle.    LLE: +1 knee, +1 ankle.  Clonus:    Rt ankle: -ve.    Lt ankle: -ve.  SLR (sitting):      RLE: +ve.      LLE: +ve.    +ve tenderness over lumbar spine.  +ve diffuse tender points over the upper & lower back, anterior chest wall, hips, elbows & knees (18/18 fibromyalgia reference points)    Gait: slow josefina, waddling   Skin:     General: Skin is warm.   Neurological:      Mental Status: She is alert and oriented to person, place, and time.   Psychiatric:         Behavior: Behavior normal.         Assessment:       1. Fibromyalgia syndrome    2. Fibromyalgia    3. Chronic midline low back pain with bilateral  sciatica    4. Osteoarthritis of spine with radiculopathy, lumbar region    5. Spinal stenosis of lumbar region, unspecified whether neurogenic claudication present (moderate, L4-5)    6. Neural foraminal stenosis of lumbar spine (moderate, bilateral L4-5)    7. Morbid obesity with BMI of 50.0-59.9, adult    8. Chronically on opiate therapy        Plan:       - Restarted and increase: DULoxetine (CYMBALTA) 60 MG capsule; Take 1 capsule (60 mg total) by mouth once daily.  - Change: oxyCODONE-acetaminophen (PERCOCET) 7.5-325 mg per tablet; Take 1 tablet by mouth 3 (three) times daily as needed for Pain.  - Continue baclofen 10 mg p.o. t.i.d. p.r.n. for muscle spasms.  - Follow up with Dr. Marin at the Pain Clinic for spine injections (likely lumbar MBB followed by RFA).  - Regular home exercise program was encouraged.  - She was encouraged to continue to follow-up with Bariatric Medicine for help with weight loss.  - Follow up in about 4 months (around 3/6/2021).     This was a 45 minute visit, 50% of which was spent educating the patient about the diagnosis and the treatment plan.    This note was partly generated with [employer] voice recognition software. I apologize for any possible typographical errors.

## 2020-11-11 ENCOUNTER — OFFICE VISIT (OUTPATIENT)
Dept: SPINE | Facility: CLINIC | Age: 50
End: 2020-11-11
Payer: MEDICAID

## 2020-11-11 ENCOUNTER — HOSPITAL ENCOUNTER (OUTPATIENT)
Dept: RADIOLOGY | Facility: OTHER | Age: 50
Discharge: HOME | End: 2020-11-11
Attending: ANESTHESIOLOGY
Payer: MEDICARE

## 2020-11-11 VITALS
SYSTOLIC BLOOD PRESSURE: 123 MMHG | WEIGHT: 293 LBS | BODY MASS INDEX: 50.02 KG/M2 | HEIGHT: 64 IN | TEMPERATURE: 98 F | HEART RATE: 81 BPM | DIASTOLIC BLOOD PRESSURE: 80 MMHG

## 2020-11-11 DIAGNOSIS — M54.42 CHRONIC MIDLINE LOW BACK PAIN WITH BILATERAL SCIATICA: ICD-10-CM

## 2020-11-11 DIAGNOSIS — G89.29 CHRONIC MIDLINE LOW BACK PAIN WITH BILATERAL SCIATICA: ICD-10-CM

## 2020-11-11 DIAGNOSIS — M47.816 LUMBAR SPONDYLOSIS: ICD-10-CM

## 2020-11-11 DIAGNOSIS — M79.18 MYOFASCIAL PAIN: Primary | ICD-10-CM

## 2020-11-11 DIAGNOSIS — M54.9 DORSALGIA, UNSPECIFIED: ICD-10-CM

## 2020-11-11 DIAGNOSIS — M54.41 CHRONIC MIDLINE LOW BACK PAIN WITH BILATERAL SCIATICA: ICD-10-CM

## 2020-11-11 DIAGNOSIS — M48.061 SPINAL STENOSIS OF LUMBAR REGION WITHOUT NEUROGENIC CLAUDICATION: ICD-10-CM

## 2020-11-11 PROCEDURE — 99999 PR PBB SHADOW E&M-EST. PATIENT-LVL V: ICD-10-PCS | Mod: PBBFAC,HCNC,, | Performed by: ANESTHESIOLOGY

## 2020-11-11 PROCEDURE — 99999 PR PBB SHADOW E&M-EST. PATIENT-LVL V: CPT | Mod: PBBFAC,HCNC,, | Performed by: ANESTHESIOLOGY

## 2020-11-11 PROCEDURE — 99214 PR OFFICE/OUTPT VISIT, EST, LEVL IV, 30-39 MIN: ICD-10-PCS | Mod: S$PBB,HCNC,, | Performed by: ANESTHESIOLOGY

## 2020-11-11 PROCEDURE — 99214 OFFICE O/P EST MOD 30 MIN: CPT | Mod: S$PBB,HCNC,, | Performed by: ANESTHESIOLOGY

## 2020-11-11 PROCEDURE — 99215 OFFICE O/P EST HI 40 MIN: CPT | Mod: PBBFAC,HCNC | Performed by: ANESTHESIOLOGY

## 2020-11-11 RX ORDER — VENLAFAXINE HYDROCHLORIDE 75 MG/1
CAPSULE, EXTENDED RELEASE ORAL
COMMUNITY
Start: 2020-10-14 | End: 2021-03-03 | Stop reason: SDUPTHER

## 2020-11-11 RX ORDER — METFORMIN HYDROCHLORIDE 500 MG/1
TABLET, EXTENDED RELEASE ORAL
COMMUNITY
Start: 2020-10-14 | End: 2021-10-19 | Stop reason: CLARIF

## 2020-11-11 RX ORDER — DESLORATADINE 5 MG/1
TABLET ORAL
COMMUNITY
Start: 2020-11-09 | End: 2021-01-06

## 2020-11-11 NOTE — PROGRESS NOTES
Chronic patient Established Note (Follow up visit)      Interval History 11/11/2020:    Yanni Kaiser presents to the clinic for a follow-up appointment for low back pain that radiates down both legs. Since the last visit, Yanni Kaiser states the pain has been persistant. Current pain intensity is 10/10. She had bilateral L3,4,5 MBB on 10/23/20 and did not have any relief from it. She is currently in excruciating pain that started last night, predominantly in the lower back with radiation to the left leg down to her foot. She is sitting upright and any movement causes severe pain. She has tried percocet, baclofen and lidocaine patch since last night with no relief. She denies new weakness, sensory changes, bowel/bladder issues.     Interval History 9/29/2020:  The patient is a virtual appointment today for follow-up of back and leg pain.  She is now status post L4/5 interlaminar epidural steroid injection.  She also reports limited benefit from this procedure.  Her pain is mainly located across the lower back with intermittent radiation into the legs.  Her back pain is currently her greatest complaint.  It bothers her the most when she stands for a long time in changes positions.  She is still in physical therapy but states that she missed her appointment today due to her nephew being ill.  Her pain today is 10/10.     Interval History 8/25/2020:  The patient is here today for follow-up of chronic lower back pain.  The pain radiates down the posterolateral aspect of both legs to her shin.  She had limited benefit with bilateral L4/5 TF NAKITA in the past.  We previously discussed IL NAKITA which she would like to schedule.  Since her previous encounter, she has started physical therapy which she thinks is helpful.  She has increased her home activity regimen as previously discussed.  Her pain today is 9/10     Interval History 7/14/2020:  The patient presents for follow up of lower back and leg pain.  She  is s/p bilateral L4/5 TF NAKITA on 6/24/20 with limited benefit of leg pain.  She denies any respiratory changes such as fever, cough, or shortness of breath.  She continues to report pain that starts across the lower back with radiation down the side of both legs.  She has associated numbness and tingling to both legs.  Her pain worsens when she walks and when she is active.  She says that she had to PT appointments did not have follow-up appointments made.  The previous note indicates that she is to continue with PT she is seeing Snow [facility]azo for chronic pain as occasion.  She says that she has been taking Lyrica at night that is making her wake up in the middle of the night with dizziness.  She stops it as instructed by PCP.  She has an appointment with her neurologist tomorrow.  Her pain today is 10/10.     Previous Encounter:  Yanni Kaiser presents to the clinic for the evaluation of lower back pain. The pain started 3 years ago following no specific incident and symptoms have been worsening.The pain is located in the lumbar area and radiates to the left leg mostly but also into the right leg. Non-specific dermatomal distribution.  The pain is described as aching, sharp and shooting and is rated as 10/10. The pain is rated with a score of  10/10 on the BEST day and a score of 10/10 on the WORST day.  Symptoms interfere with daily activity and sleeping. The pain is exacerbated by Sitting, Walking, Lifting and Getting out of bed/chair.  The pain is mitigated by heat, ice, medications and physical therapy. She reports spending 4 hours per day reclining. The patient reports 0 hours of uninterrupted sleep per night.           Physical Therapy/Home Exercise: yes currently    Pain Disability Index Review:  Last 3 PDI Scores 11/11/2020 8/25/2020 7/14/2020   Pain Disability Index (PDI) 52 [phone] 64       Pain Medications:  - Opioids: Percocet (Oxycodone/Acetaminophen)  - Baclofen  -   - Adjuvant Medications:  Advil,Motrin ( Ibuprofen)   Lidoderm patch, without benefit  Voltaren gel, without benefit  Gabapentin, side effects  Lamotrigine    Opioid Contract: not applicable     report:  Not applicable    Pain Procedures:  11/11/2020 TPI  10/23/2020 Bilateral L3,4,5 MBB  9/11/2020 L5/S1 NAKITA  6/4/20 Bilateral L4/5 TF NAKITA- no relief    Physical Therapy/Home Exercise: yes    Imaging:  EXAMINATION:  MRI LUMBAR SPINE WITHOUT CONTRAST    CLINICAL HISTORY:  spinal stenosis; Lumbago with sciatica, right side    TECHNIQUE:  Multiplanar, multisequence MR images were acquired from the thoracolumbar junction to the sacrum without contrast.    COMPARISON:  MRI lumbar spine 08/28/2018    FINDINGS:  Straightening of the normal lumbar lordosis.  Mild grade 1 retrolisthesis L5 on S1    Vertebral body heights are maintained.  Several T1 and T2 hyperintense lesions within the L2 and L3 vertebral body favored to represent osseous hemangiomas.  Otherwise normal marrow signal.  No fracture.    Multilevel partial disc desiccation throughout the lumbar spine.  Posterior annular fissuring at L2-L3.    Distal spinal cord is unremarkable.  Conus terminates at L1-L2.    Degenerative findings:    T12-L1: No disc herniation, spinal canal stenosis, or neural foraminal narrowing.    L1-L2: No disc herniation, spinal canal stenosis, or neural foraminal narrowing.    L2-L3: No disc herniation, spinal canal stenosis or neural foraminal narrowing.    L3-L4: Mild circumferential disc bulge without spinal canal stenosis or neural foraminal narrowing.    L4-L5: Circumferential disc bulge, facet arthropathy, and ligamentum flavum hypertrophy resulting in moderate spinal canal stenosis and moderate bilateral neural foraminal narrowing.    L5-S1: Circumferential disc bulge and facet arthropathy without spinal canal stenosis or neural foraminal narrowing.    Paraspinal muscles and soft tissues are unremarkable.       Impression         Multilevel lumbar  spondylosis most prominent at L4-5 resulting in moderate spinal canal stenosis and moderate bilateral neural foraminal narrowing.  Findings have slightly progressed in comparison to prior study dated 08/28/2018.    Electronically signed by resident: Fady Reese  Date: 05/22/2020  Time: 08:27    Electronically signed by: Abelardo Gold MD  Date: 05/22/2020  Time: 08:57      Labs:  Lab Results   Component Value Date    WBC 4.88 05/29/2020    HGB 12.2 05/29/2020    HCT 38.5 05/29/2020    MCV 97 05/29/2020     05/29/2020     BMP  Lab Results   Component Value Date     06/12/2020    K 4.4 06/12/2020     (H) 06/12/2020    CO2 25 06/12/2020    BUN 13 06/12/2020    CREATININE 1.3 06/12/2020    CALCIUM 8.4 (L) 06/12/2020    ANIONGAP 3 (L) 06/12/2020    ESTGFRAFRICA 56 (A) 06/12/2020    EGFRNONAA 49 (A) 06/12/2020       Allergies:   Review of patient's allergies indicates:   Allergen Reactions    Gabapentin      Makes her twitch       Current Medications:   Current Outpatient Medications   Medication Sig Dispense Refill    albuterol (PROVENTIL/VENTOLIN HFA) 90 mcg/actuation inhaler Inhale 2 puffs into the lungs every 6 (six) hours as needed. Rescue 54 g 0    baclofen (LIORESAL) 10 MG tablet Take 1 tablet (10 mg total) by mouth 3 (three) times daily. 90 tablet 0    blood sugar diagnostic Strp 1 each by Misc.(Non-Drug; Combo Route) route 4 (four) times daily before meals and nightly. ACCU CHEK ELVIA PLUS METER 200 each 3    blood-glucose meter (ACCU-CHEK ELVIA PLUS METER) Misc TEST FOUR TIMES DAILY BEFORE MEALS AND EVERY NIGHT 90 each 1    DULoxetine (CYMBALTA) 60 MG capsule Take 1 capsule (60 mg total) by mouth once daily. 30 capsule 3    galcanezumab-gnlm (EMGALITY PEN) 120 mg/mL PnIj Inject 120 mg into the skin every 28 days. 1 mL 5    lancets (ACCU-CHEK SOFTCLIX LANCETS) Misc 1 Device by Misc.(Non-Drug; Combo Route) route 4 (four) times daily. 200 each 3    levocetirizine (XYZAL) 5 MG tablet  Take 1 tablet (5 mg total) by mouth every evening. 90 tablet 0    lidocaine (LIDODERM) 5 % Place 1 patch onto the skin once daily. Remove & Discard patch within 12 hours or as directed by MD 15 patch 0    losartan (COZAAR) 100 MG tablet TAKE 1 TABLET(100 MG) BY MOUTH EVERY DAY 90 tablet 0    montelukast (SINGULAIR) 10 mg tablet Take 1 tablet (10 mg total) by mouth every evening. 30 tablet 3    ondansetron (ZOFRAN) 8 MG tablet Take 1 tablet (8 mg total) by mouth every 8 (eight) hours as needed for Nausea. 90 tablet 11    oxyCODONE-acetaminophen (PERCOCET) 7.5-325 mg per tablet Take 1 tablet by mouth 3 (three) times daily as needed for Pain. 90 tablet 0    pantoprazole (PROTONIX) 40 MG tablet Take 40 mg by mouth once daily.   3    QUEtiapine (SEROQUEL) 25 MG Tab Take 1 tablet (25 mg total) by mouth once daily. 90 tablet 0    semaglutide (OZEMPIC) 1 mg/dose (2 mg/1.5 mL) PnIj Inject 1 mg subq weekly. 9 mL 1    sumatriptan (IMITREX) 50 MG tablet Take 1 tab at onset of headaches.  If no improvement in 2 hours, take another.  Do not take more than 2 tabs in 24 hours. 9 tablet 5    topiramate (TROKENDI XR) 200 mg Cp24 Take 600 mg by mouth once daily. 90 capsule 1    albuterol-ipratropium (DUO-NEB) 2.5 mg-0.5 mg/3 mL nebulizer solution Take 3 mLs by nebulization every 6 (six) hours as needed for Wheezing or Shortness of Breath. Rescue 100 mL 3    atorvastatin (LIPITOR) 40 MG tablet Take 1 tablet (40 mg total) by mouth once daily. 90 tablet 3    budesonide-formoterol 160-4.5 mcg (SYMBICORT) 160-4.5 mcg/actuation HFAA Inhale 2 puffs into the lungs every 12 (twelve) hours. Controller 1 Inhaler 5    desloratadine (CLARINEX) 5 mg tablet       metFORMIN (GLUCOPHAGE-XR) 500 MG ER 24hr tablet       venlafaxine (EFFEXOR-XR) 75 MG 24 hr capsule        No current facility-administered medications for this visit.        REVIEW OF SYSTEMS:    GENERAL:  No weight loss, malaise or fevers.  HEENT:  + headaches  NECK:   Negative for lumps, goiter, pain and significant neck swelling.  RESPIRATORY:  Negative for cough, wheezing or shortness of breath. + SHARATH  CARDIOVASCULAR:  Negative for chest pain, leg swelling or palpitations.  GI:  Negative for abdominal discomfort, blood in stools or black stools or change in bowel habits.+ GERD  MUSCULOSKELETAL:  See HPI.  SKIN:  Negative for lesions, rash, and itching.  PSYCH:  + sleep disturbance and mood disorders  HEMATOLOGY/LYMPHOLOGY:  Negative for prolonged bleeding, bruising easily or swollen nodes.  NEURO:   No h/o syncope, paralysis. + h/o of seizures  All other reviewed and negative other than HPI.    Past Medical History:  Past Medical History:   Diagnosis Date    Allergy     Anemia     Asthma     Back pain     Chronic bronchitis     Cigarette smoker     DDD (degenerative disc disease), lumbar 2020    Depression     Diabetes with neurologic complications     DUB (dysfunctional uterine bleeding) 10/16/2018    GERD (gastroesophageal reflux disease)     High cholesterol     Hyperprolactinemia     Hypertension     Influenza A 2020    Obese body habitus     Obesity     Pseudotumor cerebri     Renal manifestation of secondary diabetes mellitus     Respiratory failure     Seizures     Simple endometrial hyperplasia 2018    Sleep apnea     Smoker     Tobacco dependence     Urinary incontinence        Past Surgical History:  Past Surgical History:   Procedure Laterality Date    BREAST CYST ASPIRATION       SECTION      X 1    CHOLECYSTECTOMY      COLONOSCOPY      COLONOSCOPY N/A 2019    Procedure: COLONOSCOPY;  Surgeon: Jagruti Sweeney MD;  Location: [facility] ([address] [place] [address]);  Service: Endoscopy;  Laterality: N/A;  BMI is 63/gastroparesis/3 days full liquid diet 1 day clear liquid see telephone encounter by Ciara limon    EPIDURAL STEROID INJECTION N/A 2020    Procedure: INJECTION, STEROID, EPIDURAL, L5-S1 IL;  Surgeon: Lawrence  MD Tomas;  Location: [facility] PAIN MGT;  Service: Pain Management;  Laterality: N/A;    ESOPHAGEAL MANOMETRY WITH MEASUREMENT OF IMPEDANCE N/A 11/5/2019    Procedure: MANOMETRY-ESOPHAGEAL-WITH IMPEDANCE;  Surgeon: Jagruti Sweeney MD;  Location: [facility] (2ND FLR);  Service: Endoscopy;  Laterality: N/A;  Hold Narcotics x 1 days   Hold TCA x 1 days  Propofol only. No fentanyl or benzodiazepine during sedation. If additional sedation needed, discuss with Dr. Sweeney.  10/29 - pt confirmed appt    ESOPHAGOGASTRODUODENOSCOPY N/A 1/14/2019    Procedure: EGD (ESOPHAGOGASTRODUODENOSCOPY);  Surgeon: Jagruti Sweeney MD;  Location: [facility] (2ND FLR);  Service: Endoscopy;  Laterality: N/A;  BMI is 63/gastroparesis/3 days full liquid diet 1 day clear liquid see telephone encounter by Ciara limon    ESOPHAGOGASTRODUODENOSCOPY N/A 11/5/2019    Procedure: ESOPHAGOGASTRODUODENOSCOPY (EGD);  Surgeon: Jagruti Sweeney MD;  Location: [facility] (2ND FLR);  Service: Endoscopy;  Laterality: N/A;  EGD with EndoFlip /+/- Botox  2nd floor for gastroparesis/BMI 63 **367lbs**  Bariatric Stretcher needed  Manometry probe to be placed endoscopically during EGD procedure  Due to change in protocol, Full liquid diet for 3 days/ 1 day of clear liquids  Hi    FOOT FRACTURE SURGERY Left     HYSTEROSCOPY WITH DILATION AND CURETTAGE OF UTERUS N/A 10/16/2018    KJB    INJECTION OF ANESTHETIC AGENT AROUND NERVE Bilateral 10/23/2020    Procedure: BLOCK, NERVE  bilateral L3,4,5 MBB;  Surgeon: Lawrence Marin MD;  Location: [facility] PAIN MGT;  Service: Pain Management;  Laterality: Bilateral;  bilateral L3,4,5 MBB   consent needed    PLANTAR FASCIOTOMY Left 11/18/2019    Procedure: FASCIOTOMY, PLANTAR;  Surgeon: Valentino Orourke DPM;  Location: [facility] OR 2ND FLR;  Service: Podiatry;  Laterality: Left;    RETINAL LASER PROCEDURE Left     SINUS SURGERY      TRANSFORAMINAL EPIDURAL INJECTION OF STEROID Bilateral 6/24/2020    Procedure:  INJECTION, STEROID, EPIDURAL, TRANSFORAMINAL APPROACH;  Surgeon: Lawrence Marin MD;  Location: [facility] PAIN MGT;  Service: Pain Management;  Laterality: Bilateral;    UPPER GASTROINTESTINAL ENDOSCOPY         Family History:  Family History   Problem Relation Age of Onset    Hypertension Mother     Diabetes Mother     Colon cancer Mother 50    Colon polyps Mother     Heart disease Father     COPD Father     Heart attack Father     Hypertension Father     Ulcers Father     Cancer Maternal Grandmother     Breast cancer Maternal Grandmother     Colon cancer Maternal Grandmother     Colon polyps Maternal Grandmother     No Known Problems Paternal Grandmother     No Known Problems Brother     No Known Problems Maternal Aunt     No Known Problems Maternal Uncle     No Known Problems Paternal Aunt     No Known Problems Paternal Uncle     No Known Problems Maternal Grandfather     No Known Problems Paternal Grandfather     Celiac disease Neg Hx     Cirrhosis Neg Hx     Crohn's disease Neg Hx     Cystic fibrosis Neg Hx     Esophageal cancer Neg Hx     Hemochromatosis Neg Hx     Inflammatory bowel disease Neg Hx     Irritable bowel syndrome Neg Hx     Liver cancer Neg Hx     Liver disease Neg Hx     Rectal cancer Neg Hx     Stomach cancer Neg Hx     Ulcerative colitis Neg Hx     Jonnie's disease Neg Hx     Tuberculosis Neg Hx     Lymphoma Neg Hx     Scleroderma Neg Hx     Rheum arthritis Neg Hx     Melanoma Neg Hx     Multiple sclerosis Neg Hx     Psoriasis Neg Hx     Lupus Neg Hx     Skin cancer Neg Hx     Amblyopia Neg Hx     Blindness Neg Hx     Cataracts Neg Hx     Glaucoma Neg Hx     Macular degeneration Neg Hx     Retinal detachment Neg Hx     Strabismus Neg Hx     Stroke Neg Hx     Thyroid disease Neg Hx        Social History:  Social History     Socioeconomic History    Marital status:      Spouse name: Not on file    Number of children: Not on file     "Years of education: Not on file    Highest education level: Not on file   Occupational History    Not on file   Social Needs    Financial resource strain: Very hard    Food insecurity     Worry: Often true     Inability: Sometimes true    Transportation needs     Medical: Yes     Non-medical: Yes   Tobacco Use    Smoking status: Current Every Day Smoker     Packs/day: 1.00     Years: 27.00     Pack years: 27.00     Types: Cigarettes     Start date: 1/1/1992    Smokeless tobacco: Never Used    Tobacco comment: 1/2 a pack if her days are good   Substance and Sexual Activity    Alcohol use: Not Currently     Alcohol/week: 0.0 standard drinks     Frequency: Never     Drinks per session: Patient refused     Binge frequency: Never     Comment: socially    Drug use: No    Sexual activity: Yes     Partners: Male     Birth control/protection: None     Comment:    Lifestyle    Physical activity     Days per week: 0 days     Minutes per session: 0 min    Stress: Very much   Relationships    Social connections     Talks on phone: Once a week     Gets together: Never     Attends Spiritism service: More than 4 times per year     Active member of club or organization: Yes     Attends meetings of clubs or organizations: More than 4 times per year     Relationship status:    Other Topics Concern    Not on file   Social History Narrative    Not on file       OBJECTIVE:    /80 (BP Location: Right arm, Patient Position: Sitting)   Pulse 81   Temp 98.4 °F (36.9 °C)   Ht 5' 4" (1.626 m)   Wt (!) 149.4 kg (329 lb 5.9 oz)   BMI 56.54 kg/m²     PHYSICAL EXAMINATION:    General appearance: Well appearing, in no acute distress, alert and oriented x3.  Psych:  Mood and affect appropriate.  Skin: Skin color, texture, turgor normal, no rashes or lesions, in both upper and lower body.  Head/face:  Normocephalic, atraumatic. No palpable lymph nodes.  GI:  Soft and non-tender.  Back: Severe TTP over the " lumbar paraspinals and laterally towards the hips. Limited ROM with pain on flexion and extension.  Positive facet loading bilaterally.  SLR is positive on the left.  Extremities: Peripheral joint ROM is full and pain free without obvious instability or laxity in all four extremities. No deformities, edema, or skin discoloration. Good capillary refill.  Musculoskeletal:   No atrophy or tone abnormalities are noted.  Neuro: Decreased sensation to BLE at L4 and L5 distributions.  Gait: Antalgic- ambulates without assistance.    ASSESSMENT: 50 y.o. year old female with low back pain, consistent with the following diagnoses     1. Myofascial pain     2. Dorsalgia, unspecified  MRI Lumbar Spine Without Contrast    Ambulatory referral/consult to Neurosurgery   3. Chronic midline low back pain with bilateral sciatica  Ambulatory referral/consult to Neurosurgery   4. Spinal stenosis of lumbar region without neurogenic claudication     5. Lumbar spondylosis       Patient has a severe exacerbation of chronic low back pain, doesn't report myelopathic symptoms. Likely myofascial pain, but we will get a stat lumbar MRI. We will perform trigger point injections today at the office to help with the severe pain. She is also not responding to any of the interventions that we have attempted so far. We will consult neurosurgery for their opinion regarding surgical intervention. We will also refer her to the Functional Restoration Program at [facility], as this might be the best option to help with her pain. We also discussed possibly trying Spinal cord stimulator in the future if she has no relief.    PLAN:     - I have stressed the importance of physical activity and a home exercise plan to help with pain and improve health.  - Trigger point injections to lumbar paraspinal areas done in the office today  - Order MRI of the Lumbar Spine to help evaluate possible source of pain.  - Referral to the Functional Restoration Program  for Lumbar stabilization, core strengthening, and a home exercise program.  - Referral to neurosurgery for evaluation.  - Patient can continue with medications for now since they are providing benefits, using them appropriately, and without side effects.  - RTC in 2 months.  - Counseled patient regarding the importance of activity modification, constant sleeping habits and physical therapy.    Procedure:  Patient Name: Yanni Kaiser  MRN: [id number]     I have reviewed and concur with the resident's history, physical, assessment, and plan.  I have personally interviewed and examined the patient at bedside.  See below addendum for my evaluation and additional findings.     INFORMED CONSENT: The procedure, risks, benefits and options were discussed with patient. There are no contraindications to the procedure. The patient expressed understanding and agreed to proceed. The personnel performing the procedure was discussed. I verify that I personally obtained Yanni's consent prior to the start of the procedure and the signed consent can be found on the patient's chart.    Procedure Date: 11/11/2020    Anesthesia: Topical    Pre Procedure diagnosis:   1. Myofascial pain    2. Dorsalgia, unspecified    3. Chronic midline low back pain with bilateral sciatica    4. Spinal stenosis of lumbar region without neurogenic claudication    5. Lumbar spondylosis        Post-Procedure diagnosis: same      Sedation: None    PROCEDURE: TRIGGER POINT INJECTION  The patient was placed in a seated position and time out was perfomed. The site of pain and procedure were confirmed with the patient prior to starting the procedure. After performing time out. The patient's  trigger points were identified at bilateral lumbar paraspinal area. The skin was prepped with alcohol three times.   After performing time out A 27-gauge 1.5 inch  needle was advanced through the skin and subcutaneous tissues.  Aspiration for blood, air and CSF was  negative.  A total of 10 ml of Bupivacaine 0.25% and 10 mg Decadron was injected at all trigger point.  No complications were evident. No specimens collected.    Blood Loss: Nill  Specimen: None    Reddy Clements MD         The above plan and management options were discussed at length with patient. Patient is in agreement with the above and verbalized understanding.    Reddy Clements  11/11/2020     I have reviewed the notes, assessments, and/or procedures performed by fellow, I concur with her/his documentation of Yanni Kaiser.    I have reviewed and concur with the resident's history, physical, assessment, and plan.  I have personally interviewed and examined the patient at bedside.  See below addendum for my evaluation and additional findings.  50-year-old female with chronic lower back pain consistent with lumbar spinal stenosis with components of myofascial pain.  Patient reports worsening lower back pain with no relief from previous lumbar epidural steroid injections and lumbar medial branch blocks.  We will order lumbar spine MRI to further evaluate the etiology of her pain and refer her to Spine surgery for evaluation if she is a candidate for surgical intervention.  We will perform trigger point injection today for myofascial component of pain.  Will also place the referral to functional restoration program.     Lawrence Marin MD

## 2020-11-16 ENCOUNTER — TELEPHONE (OUTPATIENT)
Dept: PAIN MEDICINE | Facility: OTHER | Age: 50
End: 2020-11-16

## 2020-11-16 DIAGNOSIS — I10 BENIGN ESSENTIAL HTN: ICD-10-CM

## 2020-11-16 RX ORDER — LOSARTAN POTASSIUM 100 MG/1
100 TABLET ORAL DAILY
Qty: 90 TABLET | Refills: 0 | Status: SHIPPED | OUTPATIENT
Start: 2020-11-16 | End: 2021-01-06 | Stop reason: SDUPTHER

## 2020-11-20 ENCOUNTER — TELEPHONE (OUTPATIENT)
Dept: PAIN MEDICINE | Facility: OTHER | Age: 50
End: 2020-11-20

## 2020-11-24 ENCOUNTER — TELEPHONE (OUTPATIENT)
Dept: FAMILY MEDICINE | Facility: CLINIC | Age: 50
End: 2020-11-24

## 2020-11-24 NOTE — TELEPHONE ENCOUNTER
I called and spoke to the pt and advised PCP is not in the office and with SOB and cough she should go to UC today for evaluation and testing. Pt verbalizes understanding.

## 2020-11-24 NOTE — TELEPHONE ENCOUNTER
----- Message from Lavinia Madison sent at 11/24/2020  4:08 PM CST -----  Regarding: Requesting a call back  Pt called to speak with the nurse concerning being exposed to her grandson with covid.  Pt has a cough, shortness of breath,hoarsness.    Requesting an sooner  virtual appt please    Pt  can be reached at [phone]    Thank you!

## 2020-11-30 ENCOUNTER — OFFICE VISIT (OUTPATIENT)
Dept: PODIATRY | Facility: CLINIC | Age: 50
End: 2020-11-30
Payer: MEDICARE

## 2020-11-30 VITALS
DIASTOLIC BLOOD PRESSURE: 100 MMHG | BODY MASS INDEX: 57.41 KG/M2 | SYSTOLIC BLOOD PRESSURE: 147 MMHG | HEART RATE: 70 BPM | WEIGHT: 293 LBS

## 2020-11-30 DIAGNOSIS — G89.4 CHRONIC PAIN SYNDROME: Primary | ICD-10-CM

## 2020-11-30 DIAGNOSIS — M25.572 SINUS TARSI SYNDROME OF LEFT ANKLE: ICD-10-CM

## 2020-11-30 DIAGNOSIS — Q66.6 PES VALGUS: ICD-10-CM

## 2020-11-30 DIAGNOSIS — G57.52 TARSAL TUNNEL SYNDROME OF LEFT SIDE: ICD-10-CM

## 2020-11-30 PROCEDURE — 64450 PR NERVE BLOCK INJ, ANES/STEROID, OTHER PERIPHERAL: ICD-10-PCS | Mod: 59,HCNC,LT,S$GLB | Performed by: PODIATRIST

## 2020-11-30 PROCEDURE — 3072F LOW RISK FOR RETINOPATHY: CPT | Mod: HCNC,S$GLB,, | Performed by: PODIATRIST

## 2020-11-30 PROCEDURE — 3008F BODY MASS INDEX DOCD: CPT | Mod: HCNC,CPTII,S$GLB, | Performed by: PODIATRIST

## 2020-11-30 PROCEDURE — 3072F PR LOW RISK FOR RETINOPATHY: ICD-10-PCS | Mod: HCNC,S$GLB,, | Performed by: PODIATRIST

## 2020-11-30 PROCEDURE — 3008F PR BODY MASS INDEX (BMI) DOCUMENTED: ICD-10-PCS | Mod: HCNC,CPTII,S$GLB, | Performed by: PODIATRIST

## 2020-11-30 PROCEDURE — 99999 PR PBB SHADOW E&M-EST. PATIENT-LVL IV: CPT | Mod: PBBFAC,HCNC,, | Performed by: PODIATRIST

## 2020-11-30 PROCEDURE — 3077F PR MOST RECENT SYSTOLIC BLOOD PRESSURE >= 140 MM HG: ICD-10-PCS | Mod: HCNC,CPTII,S$GLB, | Performed by: PODIATRIST

## 2020-11-30 PROCEDURE — 64450 NJX AA&/STRD OTHER PN/BRANCH: CPT | Mod: 59,HCNC,LT,S$GLB | Performed by: PODIATRIST

## 2020-11-30 PROCEDURE — 99999 PR PBB SHADOW E&M-EST. PATIENT-LVL IV: ICD-10-PCS | Mod: PBBFAC,HCNC,, | Performed by: PODIATRIST

## 2020-11-30 PROCEDURE — 20605 DRAIN/INJ JOINT/BURSA W/O US: CPT | Mod: HCNC,LT,S$GLB, | Performed by: PODIATRIST

## 2020-11-30 PROCEDURE — 20605 PR DRAIN/INJECT INTERMEDIATE JOINT/BURSA: ICD-10-PCS | Mod: HCNC,LT,S$GLB, | Performed by: PODIATRIST

## 2020-11-30 PROCEDURE — 3080F DIAST BP >= 90 MM HG: CPT | Mod: HCNC,CPTII,S$GLB, | Performed by: PODIATRIST

## 2020-11-30 PROCEDURE — 1125F AMNT PAIN NOTED PAIN PRSNT: CPT | Mod: HCNC,S$GLB,, | Performed by: PODIATRIST

## 2020-11-30 PROCEDURE — 3077F SYST BP >= 140 MM HG: CPT | Mod: HCNC,CPTII,S$GLB, | Performed by: PODIATRIST

## 2020-11-30 PROCEDURE — 1125F PR PAIN SEVERITY QUANTIFIED, PAIN PRESENT: ICD-10-PCS | Mod: HCNC,S$GLB,, | Performed by: PODIATRIST

## 2020-11-30 PROCEDURE — 3080F PR MOST RECENT DIASTOLIC BLOOD PRESSURE >= 90 MM HG: ICD-10-PCS | Mod: HCNC,CPTII,S$GLB, | Performed by: PODIATRIST

## 2020-11-30 PROCEDURE — 99214 PR OFFICE/OUTPT VISIT, EST, LEVL IV, 30-39 MIN: ICD-10-PCS | Mod: 25,HCNC,S$GLB, | Performed by: PODIATRIST

## 2020-11-30 PROCEDURE — 99214 OFFICE O/P EST MOD 30 MIN: CPT | Mod: 25,HCNC,S$GLB, | Performed by: PODIATRIST

## 2020-11-30 RX ORDER — TRIAMCINOLONE ACETONIDE 40 MG/ML
40 INJECTION, SUSPENSION INTRA-ARTICULAR; INTRAMUSCULAR
Status: COMPLETED | OUTPATIENT
Start: 2020-11-30 | End: 2020-11-30

## 2020-11-30 RX ADMIN — TRIAMCINOLONE ACETONIDE 40 MG: 40 INJECTION, SUSPENSION INTRA-ARTICULAR; INTRAMUSCULAR at 02:11

## 2020-12-02 ENCOUNTER — INITIAL CONSULT (OUTPATIENT)
Dept: PAIN MEDICINE | Facility: OTHER | Age: 50
End: 2020-12-02
Payer: MEDICARE

## 2020-12-02 DIAGNOSIS — M54.42 CHRONIC MIDLINE LOW BACK PAIN WITH BILATERAL SCIATICA: ICD-10-CM

## 2020-12-02 DIAGNOSIS — G89.29 CHRONIC MIDLINE LOW BACK PAIN WITH BILATERAL SCIATICA: ICD-10-CM

## 2020-12-02 DIAGNOSIS — G89.29 CHRONIC BILATERAL LOW BACK PAIN WITHOUT SCIATICA: ICD-10-CM

## 2020-12-02 DIAGNOSIS — M79.18 MYOFASCIAL PAIN: ICD-10-CM

## 2020-12-02 DIAGNOSIS — M54.50 CHRONIC BILATERAL LOW BACK PAIN WITHOUT SCIATICA: ICD-10-CM

## 2020-12-02 DIAGNOSIS — R52 PAIN AGGRAVATED BY ACTIVITIES OF DAILY LIVING: ICD-10-CM

## 2020-12-02 DIAGNOSIS — M54.41 CHRONIC MIDLINE LOW BACK PAIN WITH BILATERAL SCIATICA: ICD-10-CM

## 2020-12-02 DIAGNOSIS — Z74.09 IMPAIRED FUNCTIONAL MOBILITY AND ACTIVITY TOLERANCE: ICD-10-CM

## 2020-12-02 DIAGNOSIS — G89.4 CHRONIC PAIN DISORDER: Primary | ICD-10-CM

## 2020-12-02 DIAGNOSIS — Z78.9 DECREASED ACTIVITIES OF DAILY LIVING (ADL): ICD-10-CM

## 2020-12-02 DIAGNOSIS — M51.36 DDD (DEGENERATIVE DISC DISEASE), LUMBAR: ICD-10-CM

## 2020-12-02 DIAGNOSIS — M47.816 LUMBAR SPONDYLOSIS: ICD-10-CM

## 2020-12-02 PROCEDURE — 99213 OFFICE O/P EST LOW 20 MIN: CPT | Mod: HCNC,,, | Performed by: NURSE PRACTITIONER

## 2020-12-02 PROCEDURE — 99213 PR OFFICE/OUTPT VISIT, EST, LEVL III, 20-29 MIN: ICD-10-PCS | Mod: HCNC,,, | Performed by: NURSE PRACTITIONER

## 2020-12-02 PROCEDURE — 99499 RISK ADDL DX/OHS AUDIT: ICD-10-PCS | Mod: ,,, | Performed by: NURSE PRACTITIONER

## 2020-12-02 PROCEDURE — 99499 UNLISTED E&M SERVICE: CPT | Mod: ,,, | Performed by: NURSE PRACTITIONER

## 2020-12-02 NOTE — LETTER
December 2, 2020      Lawrence Marin MD  [address]  [address]  [facility] [address]           [id number]  [address], [address]  [facility] [address]  Phone: [phone]  Fax: [phone]          Patient: Yanni Kaiser   MR Number: [id number]   YOB: 1970   Date of Visit: 12/2/2020       Dear Dr. Lawrence Marin:    Thank you for referring Yanni Kaiser to me for evaluation. Attached you will find relevant portions of my assessment and plan of care.    If you have questions, please do not hesitate to call me. I look forward to following Yanni Kaiser along with you.    Sincerely,    Snow Collazo, NP    Enclosure  CC:  No Recipients    If you would like to receive this communication electronically, please contact [email or web address] or [phone] to request more information on [email or web address] Link access.    For providers and/or their staff who would like to refer a patient to [facility], please contact us through our one-stop-shop provider referral line, [facility], at [phone].    If you feel you have received this communication in error or would no longer like to receive these types of communications, please e-mail [email or web address]

## 2020-12-02 NOTE — PROGRESS NOTES
Functional Restoration Program/Chronic Pain Education Visit    Subjective:       Chief Complaint Requiring Rehabilitation: chronic Pain    Consulted by: Dr. Marin (Pain Management)      HPI:   Yanni Kaiser has been seen by me in the June of this year for initial FRP eval/Chronic Pain Education. She did not return for recommended f/u visits. She returns today as she was re-referred by Dr. Marin.     LCV with Dr. Marin was 11/11/20- MRI L spine ordered, pt referred to FRP, Neurosurgery and given TPIs. Per his note, she had LMBB 10/23/20 with no relief.     She is scheduled to see Dr. Mercado 12/7.     Seen by Dr. Negron 11/6/20- he rx Cymbalta (re-started med this date), percocet 7.5 mg tid prn, baclofen tid prn; he also rec f/u with [facility] for inj and f/u with Bariatric Med.     Saw PCP 10/26/20 for COPD exacerbation. PCP notes that pt has been admitted to ICU multiple times for COPD exacerbations and continues to smoke despite counseling.     She says that her pain has been persistent. She has been doing HEP learned in PT. Last PT visit Sept- plan per note was to continue going, but pt did not go to any other appts.       Chronic Pain Education Visit #2:  Yanni Kaiser returns one week after CPE visit #1 for f/u and goal setting. We discussed 5 domains of health on which this Chronic Pain Self-Management program will focus:  1. Physical Health and Activity/Exercise (strengthening, stretching, aerobic exercises; address smoking and opioid use if pertinent)  2. Sleep/Sleep Hygiene  3. Mental/Emotional Health (including relaxation/meditation practice)  4. Nutrition   5. Socialization     She would like to focus for now on Physical Activity and Sleep as she sees these as two areas which significantly impact her function and QOL.     She said that since LCV she has walked 3/7 days. She said that she did 5 laps around her neighborhood park the first day and went longer the second day. She  also has used an exercise step-up and did stretches and squats. She has noted an increase in her left foot pain and attributes this to her walking.     We also talked about her sleep- she usually gets in bed around 9:30/10. she said that she reads scripture on her phone before going to bed and falls asleep listening to music. She takes topamax, lyrica, and zanaflex at bedtime. She usually wakes up around 1-2 AM to use the bathroom and has difficulty falling asleep after that. She said that she stays in bed when she can't fall back asleep because she knows if she wakes up her  will also wake when he realizes she is not there. She is followed my Sleep Medicine and LCV was 4/2/20- pt set a goal to lose 20 lbs and will f/u in 3 months for repeat home sleep testing. She does have a diagnosis of SHARATH and multiple co-morbid risk factors.       Chronic Pain Education Visit #1:    Measures obtained:  PDI: 65/70  PCS: 37/52  Promis-29:     Yanni Kaiser is a 50 y.o. F who presents today with chronic pain and was referred by Dr. Marin for Chronic Pain Education (CPE). CPE was developed to enhance patients' understanding of their pain and its impact on health and to provide ongoing chronic pain self-management counseling, education, and support.     Yanni Kaiser has a history of chronic pain for many years. She denies an inciting event or trauma. She has a PMH of DM, chronic bronchitis, GERD, asthma, pseudotumor cerebri (2002), seizures (2002), migraines, sleep apnea (no CPAP). Per review of Rheumatology and Pain Mgmt notes pt has been dx with Fibromyalgia. She also has lumbar stenosis with moderate central and foraminal narrowing at L4-5 per MRI report (5/20/20). She is scheduled for a L-NAKITA with Dr. Marin on 6/24/20. Dr. Marin also referred her to outpatient PT and she has her initial eval today at 1:00 pm.     She also has a hx of left foot pain and is s/p Plantar Fasciotomy surgery 11/2019  with Dr. Haven DPM. Last visit with him was 20 where she was given percocet, referred to Rheumatology for chronic pain syndrome and advised to apply voltaren gel and massage foot with frozen water bottle.     She is followed by Bariatrics for weight loss and is taking medication to help with this. Education with regard to diet and exercise has also been stressed.     HEP:  None    Physical Therapy:  Scheduled to start today.     Pain Medications:         · Currently taking: lyrica, tizanidine, effexor, topamax, imitrex    · Has tried in the past: norco, percocet, tramadol, ibuprofen, mobic, neurontin, flexeril, voltaren gel    Interventional Procedures:  Scheduled for L-NAKITA 20 (Dr. Marin)    Relevant Surgeries:  none    Affecting sleep?  Yes    Affecting daily activities?  Yes    Affecting mood?  Yes     Work status:  Disabled 2/2 seizures/pseudotumor cerebri     Pt does smoke cigarettes daily. Denies hx of substance abuse.       Past Medical History:   Diagnosis Date    Allergy     Anemia     Asthma     Back pain     Chronic bronchitis     Cigarette smoker     DDD (degenerative disc disease), lumbar 2020    Depression     Diabetes with neurologic complications     DUB (dysfunctional uterine bleeding) 10/16/2018    GERD (gastroesophageal reflux disease)     High cholesterol     Hyperprolactinemia     Hypertension     Influenza A 2020    Obese body habitus     Obesity     Pseudotumor cerebri     Renal manifestation of secondary diabetes mellitus     Respiratory failure     Seizures     Simple endometrial hyperplasia 2018    Sleep apnea     Smoker     Tobacco dependence     Urinary incontinence        Past Surgical History:   Procedure Laterality Date    BREAST CYST ASPIRATION       SECTION      X 1    CHOLECYSTECTOMY      COLONOSCOPY      COLONOSCOPY N/A 2019    Procedure: COLONOSCOPY;  Surgeon: Jagruti Sweeney MD;  Location: [facility] ([address] [place] [address]);   Service: Endoscopy;  Laterality: N/A;  BMI is 63/gastroparesis/3 days full liquid diet 1 day clear liquid see telephone encounter by [facility]    EPIDURAL STEROID INJECTION N/A 9/11/2020    Procedure: INJECTION, STEROID, EPIDURAL, L5-S1 IL;  Surgeon: Lawrence Marin MD;  Location: [facility]IN [facility]GT;  Service: Pain Management;  Laterality: N/A;    ESOPHAGEAL MANOMETRY WITH MEASUREMENT OF IMPEDANCE N/A 11/5/2019    Procedure: MANOMETRY-ESOPHAGEAL-WITH IMPEDANCE;  Surgeon: Jagruti Sweeney MD;  Location: [facility] ([facility]);  Service: Endoscopy;  Laterality: N/A;  Hold Narcotics x 1 days   Hold TCA x 1 days  Propofol only. No fentanyl or benzodiazepine during sedation. If additional sedation needed, discuss with Dr. Sweeney.  10/29 - pt confirmed appt    ESOPHAGOGASTRODUODENOSCOPY N/A 1/14/2019    Procedure: EGD (ESOPHAGOGASTRODUODENOSCOPY);  Surgeon: Jagruti Sweeney MD;  Location: [facility] ([address] [place] [address]);  Service: Endoscopy;  Laterality: N/A;  BMI is 63/gastroparesis/3 days full liquid diet 1 day clear liquid see telephone encounter by [facility]    ESOPHAGOGASTRODUODENOSCOPY N/A 11/5/2019    Procedure: ESOPHAGOGASTRODUODENOSCOPY (EGD);  Surgeon: Jagruti Sweeney MD;  Location: [facility] ([address] [place] [address]);  Service: Endoscopy;  Laterality: N/A;  EGD with EndoFlip /+/- Botox  2nd floor for gastroparesis/BMI 63 **367lbs**  Bariatric Stretcher needed  Manometry probe to be placed endoscopically during EGD procedure  Due to change in protocol, Full liquid diet for 3 days/ 1 day of clear liquids  Hi    FOOT FRACTURE SURGERY Left     HYSTEROSCOPY WITH DILATION AND CURETTAGE OF UTERUS N/A 10/16/2018    KJB    INJECTION OF ANESTHETIC AGENT AROUND NERVE Bilateral 10/23/2020    Procedure: BLOCK, NERVE  bilateral L3,4,5 MBB;  Surgeon: Lawrence Marin MD;  Location: [facility];  Service: Pain Management;  Laterality: Bilateral;  bilateral L3,4,5 MBB   consent needed    PLANTAR FASCIOTOMY Left 11/18/2019     Procedure: FASCIOTOMY, PLANTAR;  Surgeon: Valentino Orourke DPM;  Location: [facility] OR [address] [place] [address];  Service: Podiatry;  Laterality: Left;    RETINAL LASER PROCEDURE Left     SINUS SURGERY      TRANSFORAMINAL EPIDURAL INJECTION OF STEROID Bilateral 6/24/2020    Procedure: INJECTION, STEROID, EPIDURAL, TRANSFORAMINAL APPROACH;  Surgeon: Lawrence Marin MD;  [facility];  Service: Pain Management;  Laterality: Bilateral;    UPPER GASTROINTESTINAL ENDOSCOPY         Review of patient's allergies indicates:   Allergen Reactions    Gabapentin      Makes her twitch       Current Outpatient Medications   Medication Sig Dispense Refill    albuterol (PROVENTIL/VENTOLIN HFA) 90 mcg/actuation inhaler Inhale 2 puffs into the lungs every 6 (six) hours as needed. Rescue 54 g 0    albuterol-ipratropium (DUO-NEB) 2.5 mg-0.5 mg/3 mL nebulizer solution Take 3 mLs by nebulization every 6 (six) hours as needed for Wheezing or Shortness of Breath. Rescue 100 mL 3    atorvastatin (LIPITOR) 40 MG tablet Take 1 tablet (40 mg total) by mouth once daily. 90 tablet 3    baclofen (LIORESAL) 10 MG tablet Take 1 tablet (10 mg total) by mouth 3 (three) times daily. 90 tablet 0    blood sugar diagnostic Strp 1 each by Misc.(Non-Drug; Combo Route) route 4 (four) times daily before meals and nightly. ACCU CHEK ELVIA PLUS METER 200 each 3    blood-glucose meter (ACCU-CHEK ELVIA PLUS METER) Misc TEST FOUR TIMES DAILY BEFORE MEALS AND EVERY NIGHT 90 each 1    budesonide-formoterol 160-4.5 mcg (SYMBICORT) 160-4.5 mcg/actuation HFAA Inhale 2 puffs into the lungs every 12 (twelve) hours. Controller 1 Inhaler 5    desloratadine (CLARINEX) 5 mg tablet       DULoxetine (CYMBALTA) 60 MG capsule Take 1 capsule (60 mg total) by mouth once daily. 30 capsule 3    galcanezumab-gnlm (EMGALITY PEN) 120 mg/mL PnIj Inject 120 mg into the skin every 28 days. 1 mL 5    lancets (ACCU-CHEK SOFTCLIX LANCETS) Misc 1 Device by Misc.(Non-Drug; Combo  Route) route 4 (four) times daily. 200 each 3    levocetirizine (XYZAL) 5 MG tablet Take 1 tablet (5 mg total) by mouth every evening. 90 tablet 0    lidocaine (LIDODERM) 5 % Place 1 patch onto the skin once daily. Remove & Discard patch within 12 hours or as directed by MD 15 patch 0    losartan (COZAAR) 100 MG tablet Take 1 tablet (100 mg total) by mouth once daily. 90 tablet 0    metFORMIN (GLUCOPHAGE-XR) 500 MG ER 24hr tablet       ondansetron (ZOFRAN) 8 MG tablet Take 1 tablet (8 mg total) by mouth every 8 (eight) hours as needed for Nausea. 90 tablet 11    oxyCODONE-acetaminophen (PERCOCET) 7.5-325 mg per tablet Take 1 tablet by mouth 3 (three) times daily as needed for Pain. 90 tablet 0    pantoprazole (PROTONIX) 40 MG tablet Take 40 mg by mouth once daily.   3    QUEtiapine (SEROQUEL) 25 MG Tab Take 1 tablet (25 mg total) by mouth once daily. 90 tablet 0    semaglutide (OZEMPIC) 1 mg/dose (2 mg/1.5 mL) PnIj Inject 1 mg subq weekly. 9 mL 1    sumatriptan (IMITREX) 50 MG tablet Take 1 tab at onset of headaches.  If no improvement in 2 hours, take another.  Do not take more than 2 tabs in 24 hours. 9 tablet 5    topiramate (TROKENDI XR) 200 mg Cp24 Take 600 mg by mouth once daily. 90 capsule 1    venlafaxine (EFFEXOR-XR) 75 MG 24 hr capsule        No current facility-administered medications for this visit.        Family History   Problem Relation Age of Onset    Hypertension Mother     Diabetes Mother     Colon cancer Mother 50    Colon polyps Mother     Heart disease Father     COPD Father     Heart attack Father     Hypertension Father     Ulcers Father     Cancer Maternal Grandmother     Breast cancer Maternal Grandmother     Colon cancer Maternal Grandmother     Colon polyps Maternal Grandmother     No Known Problems Paternal Grandmother     No Known Problems Brother     No Known Problems Maternal Aunt     No Known Problems Maternal Uncle     No Known Problems Paternal Aunt      No Known Problems Paternal Uncle     No Known Problems Maternal Grandfather     No Known Problems Paternal Grandfather     Celiac disease Neg Hx     Cirrhosis Neg Hx     Crohn's disease Neg Hx     Cystic fibrosis Neg Hx     Esophageal cancer Neg Hx     Hemochromatosis Neg Hx     Inflammatory bowel disease Neg Hx     Irritable bowel syndrome Neg Hx     Liver cancer Neg Hx     Liver disease Neg Hx     Rectal cancer Neg Hx     Stomach cancer Neg Hx     Ulcerative colitis Neg Hx     Jonnie's disease Neg Hx     Tuberculosis Neg Hx     Lymphoma Neg Hx     Scleroderma Neg Hx     Rheum arthritis Neg Hx     Melanoma Neg Hx     Multiple sclerosis Neg Hx     Psoriasis Neg Hx     Lupus Neg Hx     Skin cancer Neg Hx     Amblyopia Neg Hx     Blindness Neg Hx     Cataracts Neg Hx     Glaucoma Neg Hx     Macular degeneration Neg Hx     Retinal detachment Neg Hx     Strabismus Neg Hx     Stroke Neg Hx     Thyroid disease Neg Hx        Social History     Socioeconomic History    Marital status:      Spouse name: Not on file    Number of children: Not on file    Years of education: Not on file    Highest education level: Not on file   Occupational History    Not on file   Social Needs    Financial resource strain: Very hard    Food insecurity     Worry: Often true     Inability: Sometimes true    Transportation needs     Medical: Yes     Non-medical: Yes   Tobacco Use    Smoking status: Current Every Day Smoker     Packs/day: 1.00     Years: 27.00     Pack years: 27.00     Types: Cigarettes     Start date: 1/1/1992    Smokeless tobacco: Never Used    Tobacco comment: 1/2 a pack if her days are good   Substance and Sexual Activity    Alcohol use: Not Currently     Alcohol/week: 0.0 standard drinks     Frequency: Never     Drinks per session: Patient refused     Binge frequency: Never     Comment: socially    Drug use: No    Sexual activity: Yes     Partners: Male     Birth  control/protection: None     Comment:    Lifestyle    Physical activity     Days per week: 0 days     Minutes per session: 0 min    Stress: Very much   Relationships    Social connections     Talks on phone: Once a week     Gets together: Never     Attends Christian service: More than 4 times per year     Active member of club or organization: Yes     Attends meetings of clubs or organizations: More than 4 times per year     Relationship status:    Other Topics Concern    Not on file   Social History Narrative    Not on file              Objective:       Objective:     GEN: fully alert, oriented. Well developed, well nourished. No acute distress.   HEENT: No trauma. Mucous membranes moist. Nares patent bilaterally.  PSYCH: Normal affect. Thought content appropriate.  CHEST: Breathing symmetric. No audible wheezing.  SKIN: Warm, pink, dry. No rash on exposed areas.   EXT: No cyanosis, clubbing, or edema. No color change or changes in nail or hair growth  Gait: pt able to walk independently without assistive device or support.           Imaging:    CLINICAL HISTORY:  spinal stenosis; Lumbago with sciatica, right side     TECHNIQUE:  Multiplanar, multisequence MR images were acquired from the thoracolumbar junction to the sacrum without contrast.     COMPARISON:  MRI lumbar spine 08/28/2018     FINDINGS:  Straightening of the normal lumbar lordosis.  Mild grade 1 retrolisthesis L5 on S1     Vertebral body heights are maintained.  Several T1 and T2 hyperintense lesions within the L2 and L3 vertebral body favored to represent osseous hemangiomas.  Otherwise normal marrow signal.  No fracture.     Multilevel partial disc desiccation throughout the lumbar spine.  Posterior annular fissuring at L2-L3.     Distal spinal cord is unremarkable.  Conus terminates at L1-L2.     Degenerative findings:     T12-L1: No disc herniation, spinal canal stenosis, or neural foraminal narrowing.     L1-L2: No disc  herniation, spinal canal stenosis, or neural foraminal narrowing.     L2-L3: No disc herniation, spinal canal stenosis or neural foraminal narrowing.     L3-L4: Mild circumferential disc bulge without spinal canal stenosis or neural foraminal narrowing.     L4-L5: Circumferential disc bulge, facet arthropathy, and ligamentum flavum hypertrophy resulting in moderate spinal canal stenosis and moderate bilateral neural foraminal narrowing.     L5-S1: Circumferential disc bulge and facet arthropathy without spinal canal stenosis or neural foraminal narrowing.     Paraspinal muscles and soft tissues are unremarkable.     Impression:     Multilevel lumbar spondylosis most prominent at L4-5 resulting in moderate spinal canal stenosis and moderate bilateral neural foraminal narrowing.  Findings have slightly progressed in comparison to prior study dated 08/28/2018.     Electronically signed by resident: Fady Reese  Date:                                            05/22/2020  Time:                                           08:27     Electronically signed by: Abelardo Gold MD  Date:                                            05/22/2020  Time:                                           08:57  EXAMINATION:  MRI FOOT (HINDFOOT) LEFT W W/O CONTRAST     CLINICAL HISTORY:  Foot pain, chronic, plantar fasciitis suspected;  Pain in left foot     TECHNIQUE:  Routine MRI evaluation of the left hindfoot performed with and without the use of 10 cc of Gadavist IV contrast.     COMPARISON:  Radiograph 09/06/2019.     FINDINGS:  Tendons: There is fusiform thickening of the distal 5.4 cm of the Achilles tendon with low-grade partial-thickness interstitial tearing of the anterior distal fibers and associated peritendinitis.  Note is made of a 0.4 cm fluid filled gap within the anteromedial fibers of the distal Achilles tendon corresponding to aforementioned interstitial tear.  There is trace fluid within the peroneal tendon sheath  suggesting tenosynovitis.  Posterior tibial, flexor digitorum longus, flexor hallucis longus and extensor tendons are normal.     Ligaments: There is slight irregularity at the fibular insertion of the anterior talofibular ligament, presumably related to remote trauma.  Posterior talofibular, calcaneofibular, deltoid, spring and visualized portions of the Lisfranc ligament are normal.  Bifurcate, dorsal talonavicular and dorsal calcaneocuboid ligaments are intact.     Bones: No fractures.  No avascular necrosis.  No marrow infiltrative process.     Cartilage: No osteochondral lesions.  No partial or full-thickness cartilage defects.  No imaging findings to suggest a tarsal coalition.     Muscles: No accessory muscles.  Muscle bulk is preserved.     Miscellaneous: There is thickening and increased signal intensity of the central cord of the plantar fascia with associated edema of the calcaneus, flexor digitorum brevis and adjacent heel pad.  No full-thickness tear or retraction.  Tarsal tunnel is normal.  Sinus tarsi demonstrates slight increased signal intensity with grossly intact cervical and interosseous ligaments.  There is trace fluid within the retrocalcaneal bursa.     Impression:     1. MR imaging findings consistent with plantar fasciitis noting thickening and increased signal intensity within the central cord of the plantar fascia with associated edema of the flexor digitorum brevis, calcaneus and adjacent heel pad.  2. Distal Achilles tendinosis with low-grade interstitial tearing and associated peritendinitis.  Trace fluid within the retrocalcaneal bursa suggests bursitis.  3. Trace peroneal tenosynovitis.  4. Irregularity at the fibular insertion of the anterior talofibular ligament suggesting sequela of a remote injury.  No full-thickness tear.        Electronically signed by: Chapin Singleton MD  Date:                                            09/12/2019  Time:                                            13:11          Assessment:     Encounter Diagnoses   Name Primary?    Chronic midline low back pain with bilateral sciatica     Myofascial pain     Chronic pain disorder Yes    Decreased activities of daily living (ADL)     Chronic bilateral low back pain without sciatica     DDD (degenerative disc disease), lumbar     Lumbar spondylosis     Pain aggravated by activities of daily living     Impaired functional mobility and activity tolerance        Plan:     Diagnoses and all orders for this visit:    Chronic pain disorder    Chronic midline low back pain with bilateral sciatica  -     Ambulatory referral/consult to [facility]    Myofascial pain  -     Ambulatory referral/consult to [facility]    Decreased activities of daily living (ADL)    Chronic bilateral low back pain without sciatica    DDD (degenerative disc disease), lumbar    Lumbar spondylosis    Pain aggravated by activities of daily living    Impaired functional mobility and activity tolerance       Yanni Kaiser continues to deal with chronic pain all over that impacts her function and QOL despite medical/interventional tx. She has been followed by Pain Mgmt and recent L-MBB provided no relief. She is scheduled for MRI tomorrow and will see Ortho Spine Surgery on 12/7. If no surgery is recommended, I would recommend she pursue FRP. She seems motivated to want to get better and help herself, but she has several barriers. She has a grandson who is autistic who she cares for regularly. She is unsure if she can commit to FRP given the schedule. She will consider it, and I will f/u with her after her appt with Dr. Mercado on 12/7. I do not think that she is a good candidate for Chronic Pain Education program because I do believe that she needs a more intensive/immersive program like FRP to be successful.       I spent a total of 60 minutes with the patient, and greater than 50% of the time was spent in  counseling and education.     The above plan and management options were discussed at length with patient. Patient is in agreement with the above and verbalized understanding. It will be communicated with the referring physician via electronic record, fax, or mail.

## 2020-12-03 ENCOUNTER — HOSPITAL ENCOUNTER (OUTPATIENT)
Dept: RADIOLOGY | Facility: HOSPITAL | Age: 50
Discharge: HOME | End: 2020-12-03
Attending: ANESTHESIOLOGY
Payer: MEDICARE

## 2020-12-03 PROCEDURE — 72148 MRI LUMBAR SPINE W/O DYE: CPT | Mod: TC,HCNC

## 2020-12-03 PROCEDURE — 72148 MRI LUMBAR SPINE WITHOUT CONTRAST: ICD-10-PCS | Mod: 26,HCNC,, | Performed by: RADIOLOGY

## 2020-12-03 PROCEDURE — 72148 MRI LUMBAR SPINE W/O DYE: CPT | Mod: 26,HCNC,, | Performed by: RADIOLOGY

## 2020-12-03 NOTE — PROGRESS NOTES
Subjective:      Patient ID: Yanni Kaiser is a 50 y.o. female.    Chief Complaint: Foot Pain (left foot )    She is here for follow-up bilateral foot pain.  She previously had a plantar fasciectomy.  She was doing quite well for some time and had a recent new injury.  She also was complaining of diffuse muscle pain throughout upper and lower extremities.  She is having more left pain the noted a right.  She is here for further re-evaluation.      Review of Systems   Constitution: Negative for chills, fever and malaise/fatigue.   HENT: Negative for hearing loss.    Cardiovascular: Negative for claudication.   Respiratory: Negative for shortness of breath.    Skin: Negative for flushing and rash.   Musculoskeletal: Negative for joint pain and myalgias.        Pain to both feet   Neurological: Negative for loss of balance, numbness, paresthesias and sensory change.   Psychiatric/Behavioral: Negative for altered mental status.           Objective:      Physical Exam  Constitutional:       Appearance: She is well-developed.   Cardiovascular:      Pulses:           Dorsalis pedis pulses are 2+ on the right side and 2+ on the left side.        Posterior tibial pulses are 2+ on the right side and 2+ on the left side.   Musculoskeletal:      Right knee: She exhibits no swelling and no ecchymosis.      Left knee: She exhibits no swelling and no ecchymosis.      Right ankle: She exhibits normal range of motion, no swelling, no ecchymosis and normal pulse. No lateral malleolus, no medial malleolus and no head of 5th metatarsal tenderness found. Achilles tendon exhibits no pain, no defect and normal Copeland's test results.      Left ankle: She exhibits normal range of motion, no swelling, no ecchymosis and normal pulse. No lateral malleolus, no medial malleolus and no head of 5th metatarsal tenderness found. Achilles tendon exhibits no pain and normal Copeland's test results.      Right lower leg: She exhibits no  tenderness, no bony tenderness, no swelling and no deformity. No edema.      Left lower leg: She exhibits no tenderness and no swelling. No edema.      Right foot: Normal range of motion. No deformity.      Left foot: Normal range of motion. No deformity.      Comments: Pain along the plantar aspect of the left foot. Maximal tenderness is along the small plantar fibroma at the distal medial band of the plantar fascia. Tenderness tracking proximally along the medial band and into the tarsal tunnel.    Right foot has tenderness along the plantar aspect of the foot and into the tarsal tunnel.  In addition, she has tenderness overlying the anterior medial anterior lateral and posterior compartments of both lower extremities.  This Is tender with pinpoint palpation.  She has multiple areas of pinpoint tenderness to both lower extremities.  Tenderness to the left sinus tarsi/subtalar joint increased with eversion and inversion of the joint.   Feet:      Right foot:      Protective Sensation: 5 sites tested. 5 sites sensed.      Left foot:      Protective Sensation: 5 sites tested. 5 sites sensed.   Skin:     General: Skin is warm.      Capillary Refill: Capillary refill takes more than 3 seconds.      Findings: No abrasion, bruising, burn or ecchymosis.      Comments: No open lesions noted to b/L lower extremities.     Examination of the skin reveals no evidence of significant rashes, open lesions, suspicious appearing nevi or other concerning lesions.      Neurological:      Mental Status: She is alert and oriented to person, place, and time.      Comments:   Light touch intact.  MMT 5/5 DF, PF, Inv, Ev  Normal muscle tone.  No signs of atrophy.  No tremor or spasticity.     Psychiatric:         Attention and Perception: She is attentive.         Speech: Speech normal.         Behavior: Behavior normal.               Assessment:       Encounter Diagnoses   Name Primary?    Chronic pain syndrome Yes    Tarsal tunnel  syndrome of left side     Pes valgus     Sinus tarsi syndrome of left ankle          Plan:       Yanni was seen today for foot pain.    Diagnoses and all orders for this visit:    Chronic pain syndrome    Tarsal tunnel syndrome of left side    Pes valgus    Sinus tarsi syndrome of left ankle    Other orders  -     triamcinolone acetonide injection 40 mg      I counseled the patient on her conditions, their implications and medical management.    A steroid injection was performed at left sinus tarsi using 1% plain Lidocaine and 1 mL/40 mg of Kenalog. This was well tolerated.  The area overlying the left tarsal tunnel nerve(s) was sterilely prepped, verbal consent was obtained, 2 cc's of 0.5% Marcaine plain was infiltrated around the affected nerve(s) for a diagnostic nerve block.  This was well tolerated with no complications.    Continue medication for Neurology and pain management.  Recommend custom orthotics and diabetic shoes for pes planus and sinus tarsi syndrome.  Follow-up once received in broken in to discuss potential modifications as necessary.  I I recommended to begin custom or n addition I recommend custom diabetic orthotic/arch supports with extra-depth shoes to help support.

## 2020-12-06 ENCOUNTER — PATIENT OUTREACH (OUTPATIENT)
Dept: ADMINISTRATIVE | Facility: OTHER | Age: 50
End: 2020-12-06

## 2020-12-06 DIAGNOSIS — Z79.4 TYPE 2 DIABETES MELLITUS WITH STAGE 3 CHRONIC KIDNEY DISEASE, WITH LONG-TERM CURRENT USE OF INSULIN, UNSPECIFIED WHETHER STAGE 3A OR 3B CKD: Primary | ICD-10-CM

## 2020-12-06 DIAGNOSIS — E11.22 TYPE 2 DIABETES MELLITUS WITH STAGE 3 CHRONIC KIDNEY DISEASE, WITH LONG-TERM CURRENT USE OF INSULIN, UNSPECIFIED WHETHER STAGE 3A OR 3B CKD: Primary | ICD-10-CM

## 2020-12-06 DIAGNOSIS — N18.30 TYPE 2 DIABETES MELLITUS WITH STAGE 3 CHRONIC KIDNEY DISEASE, WITH LONG-TERM CURRENT USE OF INSULIN, UNSPECIFIED WHETHER STAGE 3A OR 3B CKD: Primary | ICD-10-CM

## 2020-12-06 NOTE — PROGRESS NOTES
Requested updates within Care Everywhere.  Patient's chart was reviewed for overdue KATE topics.  Immunizations reconciled.    Orders placed:Hemoglobin A1c

## 2020-12-07 ENCOUNTER — TELEPHONE (OUTPATIENT)
Dept: GASTROENTEROLOGY | Facility: CLINIC | Age: 50
End: 2020-12-07

## 2020-12-07 ENCOUNTER — OFFICE VISIT (OUTPATIENT)
Dept: ORTHOPEDICS | Facility: CLINIC | Age: 50
End: 2020-12-07
Payer: MEDICARE

## 2020-12-07 ENCOUNTER — HOSPITAL ENCOUNTER (OUTPATIENT)
Dept: RADIOLOGY | Facility: HOSPITAL | Age: 50
Discharge: HOME | End: 2020-12-07
Attending: ORTHOPAEDIC SURGERY
Payer: MEDICARE

## 2020-12-07 VITALS — WEIGHT: 293 LBS | BODY MASS INDEX: 50.02 KG/M2 | TEMPERATURE: 98 F | HEIGHT: 64 IN

## 2020-12-07 DIAGNOSIS — M43.10 SPONDYLOLISTHESIS, ACQUIRED: Primary | ICD-10-CM

## 2020-12-07 DIAGNOSIS — M51.36 DDD (DEGENERATIVE DISC DISEASE), LUMBAR: ICD-10-CM

## 2020-12-07 PROCEDURE — 3008F PR BODY MASS INDEX (BMI) DOCUMENTED: ICD-10-PCS | Mod: HCNC,CPTII,S$GLB, | Performed by: ORTHOPAEDIC SURGERY

## 2020-12-07 PROCEDURE — 1125F AMNT PAIN NOTED PAIN PRSNT: CPT | Mod: HCNC,S$GLB,, | Performed by: ORTHOPAEDIC SURGERY

## 2020-12-07 PROCEDURE — 72100 XR LUMBAR SPINE AP AND LAT WITH FLEX/EXT: ICD-10-PCS | Mod: 26,HCNC,, | Performed by: RADIOLOGY

## 2020-12-07 PROCEDURE — 99204 OFFICE O/P NEW MOD 45 MIN: CPT | Mod: HCNC,S$GLB,, | Performed by: ORTHOPAEDIC SURGERY

## 2020-12-07 PROCEDURE — 99204 PR OFFICE/OUTPT VISIT, NEW, LEVL IV, 45-59 MIN: ICD-10-PCS | Mod: HCNC,S$GLB,, | Performed by: ORTHOPAEDIC SURGERY

## 2020-12-07 PROCEDURE — 99999 PR PBB SHADOW E&M-EST. PATIENT-LVL IV: ICD-10-PCS | Mod: PBBFAC,HCNC,, | Performed by: ORTHOPAEDIC SURGERY

## 2020-12-07 PROCEDURE — 99999 PR PBB SHADOW E&M-EST. PATIENT-LVL IV: CPT | Mod: PBBFAC,HCNC,, | Performed by: ORTHOPAEDIC SURGERY

## 2020-12-07 PROCEDURE — 72120 X-RAY BEND ONLY L-S SPINE: CPT | Mod: 26,HCNC,, | Performed by: RADIOLOGY

## 2020-12-07 PROCEDURE — 72120 X-RAY BEND ONLY L-S SPINE: CPT | Mod: TC,HCNC

## 2020-12-07 PROCEDURE — 72120 XR LUMBAR SPINE AP AND LAT WITH FLEX/EXT: ICD-10-PCS | Mod: 26,HCNC,, | Performed by: RADIOLOGY

## 2020-12-07 PROCEDURE — 72100 X-RAY EXAM L-S SPINE 2/3 VWS: CPT | Mod: 26,HCNC,, | Performed by: RADIOLOGY

## 2020-12-07 PROCEDURE — 3072F LOW RISK FOR RETINOPATHY: CPT | Mod: HCNC,S$GLB,, | Performed by: ORTHOPAEDIC SURGERY

## 2020-12-07 PROCEDURE — 1125F PR PAIN SEVERITY QUANTIFIED, PAIN PRESENT: ICD-10-PCS | Mod: HCNC,S$GLB,, | Performed by: ORTHOPAEDIC SURGERY

## 2020-12-07 PROCEDURE — 3072F PR LOW RISK FOR RETINOPATHY: ICD-10-PCS | Mod: HCNC,S$GLB,, | Performed by: ORTHOPAEDIC SURGERY

## 2020-12-07 PROCEDURE — 3008F BODY MASS INDEX DOCD: CPT | Mod: HCNC,CPTII,S$GLB, | Performed by: ORTHOPAEDIC SURGERY

## 2020-12-07 NOTE — PROGRESS NOTES
DATE: 12/7/2020  PATIENT: Yanni Kaiser    Attending Physician: Eduardo Mercado M.D.    CHIEF COMPLAINT: low back pain    HISTORY:  Yanni Kaiser is a 50 y.o. female who is here for initial evaluation of low back and left leg pain (Back - 5, Leg - 8). The pain has been present for many years but acutely worse over the last several months. She doesn't remember what precipitated the symptoms. She reports that she has a constant throbbing in her back and leg. She says the pain radiates to her hips. The patient describes the pain as sharp and radiating. Feels like a constant throbbing and electrical sensation.  The pain is worse with activity and improved by nothing per the patient. There is no associated numbness and tingling. There is no subjective weakness. Prior treatments have included PT, injections ( last injection was was in October) minimal improvement, but no surgery.  Patient has history of DM and gastroparesis.   Patient is disabled, previous history of seizures.   The Patient denies myelopathic symptoms such as handwriting changes or difficulty with buttons/coins/keys. Denies perineal paresthesias, bowel/bladder dysfunction.    PAST MEDICAL/SURGICAL HISTORY:  Past Medical History:   Diagnosis Date    Allergy     Anemia     Asthma     Back pain     Chronic bronchitis     Cigarette smoker     DDD (degenerative disc disease), lumbar 6/9/2020    Depression     Diabetes with neurologic complications     DUB (dysfunctional uterine bleeding) 10/16/2018    GERD (gastroesophageal reflux disease)     High cholesterol     Hyperprolactinemia     Hypertension     Influenza A 01/16/2020    Obese body habitus     Obesity     Pseudotumor cerebri     Renal manifestation of secondary diabetes mellitus     Respiratory failure     Seizures     Simple endometrial hyperplasia 2018    Sleep apnea     Smoker     Tobacco dependence     Urinary incontinence      Past Surgical History:    Procedure Laterality Date    BREAST CYST ASPIRATION       SECTION      X 1    CHOLECYSTECTOMY      COLONOSCOPY      COLONOSCOPY N/A 2019    Procedure: COLONOSCOPY;  Surgeon: Jagruti Sweeney MD;  Location: [facility] ([address] [place] [address]);  Service: Endoscopy;  Laterality: N/A;  BMI is 63/gastroparesis/3 days full liquid diet 1 day clear liquid see telephone encounter by Ciara Brown     EPIDURAL STEROID INJECTION N/A 2020    Procedure: INJECTION, STEROID, EPIDURAL, L5-S1 IL;  Surgeon: Lawrence Marin MD;  Location: [facility];  Service: Pain Management;  Laterality: N/A;    ESOPHAGEAL MANOMETRY WITH MEASUREMENT OF IMPEDANCE N/A 2019    Procedure: MANOMETRY-ESOPHAGEAL-WITH IMPEDANCE;  Surgeon: Jagruti Sweeney MD;  Location: [facility] [address]);  Service: Endoscopy;  Laterality: N/A;  Hold Narcotics x 1 days   Hold TCA x 1 days  Propofol only. No fentanyl or benzodiazepine during sedation. If additional sedation needed, discuss with Dr. Sweeney.  10/29 - pt confirmed appt    ESOPHAGOGASTRODUODENOSCOPY N/A 2019    Procedure: EGD (ESOPHAGOGASTRODUODENOSCOPY);  Surgeon: Jagruti Sweeney MD;  Location: [facility] [address]);  Service: Endoscopy;  Laterality: N/A;  BMI is 63/gastroparesis/3 days full liquid diet 1 day clear liquid see telephone encounter by Ciara Brown     ESOPHAGOGASTRODUODENOSCOPY N/A 2019    Procedure: ESOPHAGOGASTRODUODENOSCOPY (EGD);  Surgeon: Jagruti Sweeney MD;  Location: [facility] [address]);  Service: Endoscopy;  Laterality: N/A;  EGD with EndoFlip /+/- Botox  2nd floor for gastroparesis/BMI 63 **367lbs**  Bariatric Stretcher needed  Manometry probe to be placed endoscopically during EGD procedure  Due to change in protocol, Full liquid diet for 3 days/ 1 day of clear liquids  Hi    FOOT FRACTURE SURGERY Left     HYSTEROSCOPY WITH DILATION AND CURETTAGE OF UTERUS N/A 10/16/2018    KJB    INJECTION OF ANESTHETIC AGENT AROUND NERVE Bilateral  10/23/2020    Procedure: BLOCK, NERVE  bilateral L3,4,5 MBB;  Surgeon: Lawrence Marin MD;  Location: [facility];  Service: Pain Management;  Laterality: Bilateral;  bilateral L3,4,5 MBB   consent needed    PLANTAR FASCIOTOMY Left 11/18/2019    Procedure: FASCIOTOMY, PLANTAR;  Surgeon: Valentino Orourke DPM;  Location: [facility] OR [address] [place] [address];  Service: Podiatry;  Laterality: Left;    RETINAL LASER PROCEDURE Left     SINUS SURGERY      TRANSFORAMINAL EPIDURAL INJECTION OF STEROID Bilateral 6/24/2020    Procedure: INJECTION, STEROID, EPIDURAL, TRANSFORAMINAL APPROACH;  Surgeon: Lawrence Marin MD;  Location: [facility];  Service: Pain Management;  Laterality: Bilateral;    UPPER GASTROINTESTINAL ENDOSCOPY         Current Medications:   Current Outpatient Medications:     albuterol (PROVENTIL/VENTOLIN HFA) 90 mcg/actuation inhaler, Inhale 2 puffs into the lungs every 6 (six) hours as needed. Rescue, Disp: 54 g, Rfl: 0    baclofen (LIORESAL) 10 MG tablet, Take 1 tablet (10 mg total) by mouth 3 (three) times daily., Disp: 90 tablet, Rfl: 0    blood sugar diagnostic Strp, 1 each by Misc.(Non-Drug; Combo Route) route 4 (four) times daily before meals and nightly. ACCU CHEK ELVIA PLUS METER, Disp: 200 each, Rfl: 3    blood-glucose meter (ACCU-CHEK ELVIA PLUS METER) Misc, TEST FOUR TIMES DAILY BEFORE MEALS AND EVERY NIGHT, Disp: 90 each, Rfl: 1    desloratadine (CLARINEX) 5 mg tablet, , Disp: , Rfl:     galcanezumab-gnlm (EMGALITY PEN) 120 mg/mL PnIj, Inject 120 mg into the skin every 28 days., Disp: 1 mL, Rfl: 5    lancets (ACCU-CHEK SOFTCLIX LANCETS) Misc, 1 Device by Misc.(Non-Drug; Combo Route) route 4 (four) times daily., Disp: 200 each, Rfl: 3    levocetirizine (XYZAL) 5 MG tablet, Take 1 tablet (5 mg total) by mouth every evening., Disp: 90 tablet, Rfl: 0    lidocaine (LIDODERM) 5 %, Place 1 patch onto the skin once daily. Remove & Discard patch within 12 hours or as directed by MD, Disp: 15 patch, Rfl:  0    losartan (COZAAR) 100 MG tablet, Take 1 tablet (100 mg total) by mouth once daily., Disp: 90 tablet, Rfl: 0    metFORMIN (GLUCOPHAGE-XR) 500 MG ER 24hr tablet, , Disp: , Rfl:     ondansetron (ZOFRAN) 8 MG tablet, Take 1 tablet (8 mg total) by mouth every 8 (eight) hours as needed for Nausea., Disp: 90 tablet, Rfl: 11    oxyCODONE-acetaminophen (PERCOCET) 7.5-325 mg per tablet, Take 1 tablet by mouth 3 (three) times daily as needed for Pain., Disp: 90 tablet, Rfl: 0    pantoprazole (PROTONIX) 40 MG tablet, Take 40 mg by mouth once daily. , Disp: , Rfl: 3    QUEtiapine (SEROQUEL) 25 MG Tab, Take 1 tablet (25 mg total) by mouth once daily., Disp: 90 tablet, Rfl: 0    semaglutide (OZEMPIC) 1 mg/dose (2 mg/1.5 mL) PnIj, Inject 1 mg subq weekly., Disp: 9 mL, Rfl: 1    sumatriptan (IMITREX) 50 MG tablet, Take 1 tab at onset of headaches.  If no improvement in 2 hours, take another.  Do not take more than 2 tabs in 24 hours., Disp: 9 tablet, Rfl: 5    topiramate (TROKENDI XR) 200 mg Cp24, Take 600 mg by mouth once daily., Disp: 90 capsule, Rfl: 1    venlafaxine (EFFEXOR-XR) 75 MG 24 hr capsule, , Disp: , Rfl:     albuterol-ipratropium (DUO-NEB) 2.5 mg-0.5 mg/3 mL nebulizer solution, Take 3 mLs by nebulization every 6 (six) hours as needed for Wheezing or Shortness of Breath. Rescue, Disp: 100 mL, Rfl: 3    atorvastatin (LIPITOR) 40 MG tablet, Take 1 tablet (40 mg total) by mouth once daily., Disp: 90 tablet, Rfl: 3    budesonide-formoterol 160-4.5 mcg (SYMBICORT) 160-4.5 mcg/actuation HFAA, Inhale 2 puffs into the lungs every 12 (twelve) hours. Controller, Disp: 1 Inhaler, Rfl: 5    DULoxetine (CYMBALTA) 60 MG capsule, Take 1 capsule (60 mg total) by mouth once daily., Disp: 30 capsule, Rfl: 3    Social History:   Social History     Socioeconomic History    Marital status:      Spouse name: Not on file    Number of children: Not on file    Years of education: Not on file    Highest education  level: Not on file   Occupational History    Not on file   Social Needs    Financial resource strain: Very hard    Food insecurity     Worry: Often true     Inability: Sometimes true    Transportation needs     Medical: Yes     Non-medical: Yes   Tobacco Use    Smoking status: Current Every Day Smoker     Packs/day: 1.00     Years: 27.00     Pack years: 27.00     Types: Cigarettes     Start date: 1/1/1992    Smokeless tobacco: Never Used    Tobacco comment: 1/2 a pack if her days are good   Substance and Sexual Activity    Alcohol use: Not Currently     Alcohol/week: 0.0 standard drinks     Frequency: Never     Drinks per session: Patient refused     Binge frequency: Never     Comment: socially    Drug use: No    Sexual activity: Yes     Partners: Male     Birth control/protection: None     Comment:    Lifestyle    Physical activity     Days per week: 0 days     Minutes per session: 0 min    Stress: Very much   Relationships    Social connections     Talks on phone: Once a week     Gets together: Never     Attends Christian service: More than 4 times per year     Active member of club or organization: Yes     Attends meetings of clubs or organizations: More than 4 times per year     Relationship status:    Other Topics Concern    Not on file   Social History Narrative    Not on file       REVIEW OF SYSTEMS:  Constitution: Negative. Negative for chills, fever and night sweats.   Cardiovascular: Negative for chest pain and syncope.   Respiratory: Negative for cough and shortness of breath.   Gastrointestinal: See HPI. Negative for nausea/vomiting. Negative for abdominal pain.  Genitourinary: See HPI. Negative for discoloration or dysuria.  Hematologic/Lymphatic: negn for bleeding/clotting disorders.   Musculoskeletal: Negative for falls and muscle weakness.   Neurological: See HPI. pos history of seizures. neg history of cranial surgery or shunts.  Neurological: See HPI. No seizures.  "  Endocrine: Negative for polydipsia, polyphagia and polyuria.   Allergic/Immunologic: Negative for hives and persistent infections.     EXAM:  Temp 97.6 °F (36.4 °C) (Tympanic)   Ht 5' 4" (1.626 m)   Wt (!) 153.3 kg (338 lb)   BMI 58.02 kg/m²     PHYSICAL EXAMINATION:    General: The patient is a Obese 50 y.o. female in no apparent distress, the patient is orientatied to person, place and time.  Psych: Normal mood and affect  HEENT: Vision grossly intact, hearing intact to the spoken word.  Lungs: Respirations unlabored.  Gait: Normal station and gait, no difficulty with toe or heel walk.   Skin: Dorsal lumbar skin negative for rashes, lesions, hairy patches and surgical scars. There is no lumbar tenderness to palpation.  Range of motion: Lumbar range of motion is acceptable.  Spinal Balance: Global saggital and coronal spinal balance acceptable, no significant for scoliosis and kyphosis.  Musculoskeletal: No pain with the range of motion of the bilateral hips. No trochanteric tenderness to palpation.  Vascular: Bilateral lower extremities warm and well perfused, Dorsalis pedis pulses 2+ bilaterally.  Neurological: Normal strength and tone in all major motor groups in the bilateral lower extremities. Normal sensation to light touch in the L2-S1 dermatomes bilaterally.  Deep tendon reflexes symmetric  in the bilateral lower extremities.  Negative Babinski bilaterally. Straight leg raise negative bilaterally.    IMAGING:      Today I personally reviewed AP, Lat and Flex/Ex  upright L-spine that demonstrate L4-5 spondylolisthesis  MRI lumbar spine shows moderate stenosis primarily at L3-4, L4-5.       Body mass index is 58.02 kg/m².  Hemoglobin A1C   Date Value Ref Range Status   06/12/2020 5.3 4.0 - 5.6 % Final     Comment:     ADA Screening Guidelines:  5.7-6.4%  Consistent with prediabetes  >or=6.5%  Consistent with diabetes  High levels of fetal hemoglobin interfere with the HbA1C  assay. Heterozygous " hemoglobin variants (HbS, HgC, etc)do  not significantly interfere with this assay.   However, presence of multiple variants may affect accuracy.     11/01/2019 5.6 4.0 - 5.6 % Final     Comment:     ADA Screening Guidelines:  5.7-6.4%  Consistent with prediabetes  >or=6.5%  Consistent with diabetes  High levels of fetal hemoglobin interfere with the HbA1C  assay. Heterozygous hemoglobin variants (HbS, HgC, etc)do  not significantly interfere with this assay.   However, presence of multiple variants may affect accuracy.     03/20/2019 6.5 (H) 4.0 - 5.6 % Final     Comment:     ADA Screening Guidelines:  5.7-6.4%  Consistent with prediabetes  >or=6.5%  Consistent with diabetes  High levels of fetal hemoglobin interfere with the HbA1C  assay. Heterozygous hemoglobin variants (HbS, HgC, etc)do  not significantly interfere with this assay.   However, presence of multiple variants may affect accuracy.         ASSESSMENT/PLAN:    Yanni was seen today for pain.    Diagnoses and all orders for this visit:    Spondylolisthesis, acquired  -     Ambulatory referral/consult to Physical/Occupational Therapy; Future      No follow-ups on file.    Yanni Kaiser is a 50 y.o. female with lumbar radiculopathy  -Recommend weight loss  -Referral for formal PT for spine

## 2020-12-07 NOTE — TELEPHONE ENCOUNTER
H. Pylori still positive     H. Pylori in 2016. Unsure if treated. EGD negative.    2/12/2019 stools + - doxy/flagyl/pepto/ppix 14 days - Quad   10/2019 stool + - Amox, clarith, PPI - triple   3/2020: stool + levo+amox+pant x 14d - levo based triple     Inquiring re h.p culture and sensitivity vs rifabutin vs ID consult

## 2020-12-07 NOTE — LETTER
December 16, 2020      Lawrence Marin MD  [address]  [address]  [facility] [address]           [id number]  [address]  [place] [address]  Phone: [phone]          Patient: Yanni Kaiser   MR Number: [id number]   YOB: 1970   Date of Visit: 12/7/2020       Dear Dr. Lawrence Marin:    Thank you for referring Yanni Kaiser to me for evaluation. Attached you will find relevant portions of my assessment and plan of care.    If you have questions, please do not hesitate to call me. I look forward to following Yanni Kaiser along with you.    Sincerely,    Eduardo Mercado MD    Enclosure  CC:  No Recipients    If you would like to receive this communication electronically, please contact [email or web address] or [phone] to request more information on [facility] Link access.    For providers and/or their staff who would like to refer a patient to [facility], please contact us through our one-stop-shop provider referral line, [facility], at [phone].    If you feel you have received this communication in error or would no longer like to receive these types of communications, please e-mail [email or web address]

## 2020-12-08 ENCOUNTER — PATIENT MESSAGE (OUTPATIENT)
Dept: GASTROENTEROLOGY | Facility: CLINIC | Age: 50
End: 2020-12-08

## 2020-12-08 ENCOUNTER — TELEPHONE (OUTPATIENT)
Dept: GASTROENTEROLOGY | Facility: CLINIC | Age: 50
End: 2020-12-08

## 2020-12-08 ENCOUNTER — TELEPHONE (OUTPATIENT)
Dept: ENDOSCOPY | Facility: HOSPITAL | Age: 50
End: 2020-12-08

## 2020-12-08 DIAGNOSIS — A04.8 H. PYLORI INFECTION: Primary | ICD-10-CM

## 2020-12-08 NOTE — TELEPHONE ENCOUNTER
Spoke w pt. Results and recs discussed. Let her know the importance of taking antibiotics correctly as she still has it and we are running out of treatment options. Also informed pt Marco was not covered so waiting on  as to next step.  Will send the instructions through portal.    Pt states she needs on procedure on Monday. Msg routed to Hermila.

## 2020-12-08 NOTE — TELEPHONE ENCOUNTER
H. Pylori in 2016. Unsure if treated. EGD negative.    2/12/2019 stools + - doxy/flagyl/pepto/ppix 14 days - Quad   10/2019 stool + - Amox, clarith, PPI - triple   3/2020: stool + levo+amox+pant x 14d - levo based triple     Please let patient know that her labs show that she still has H pylori infection.  I would like to start a new medication to treat this.  I am sending Talroc[place]oa TID x 14 days to [facility] pharmacy as this will need prior authorization.      She should hold pantoprazole or any other PPI during the course of this treatment.      Please remind her that it is absolutely essential that she takes all of the medications as we are running out of treatment options for her H pylori.  She will need to have repeat stool H pylori antigen in the 12 weeks of PPI for 2 weeks    For hpylori testing    1. Off all Antibiotics for 4 (Four) weeks before stool collection.      2. Off all Proton Pump Inhibitors medications for 2 (Two) weeks before collecting stool for H. Pylori Antigen:  :  Nexium (esomeprazole)  Prilosec (omeprazole)   Protonix (pantoprazole)  Prevacid (lansoprazole)  Aciphex (rabeprazole)  Dexilant (dexlansoprazole)    Zegerid     3. Off all H2 blockers medications for 2 (Two) weeks before collecting stool for H. Pylori Antigen:    Zantac (ranitidine)  Tagamet (cimetadine)  Axid (nizatidine)   Pepcid (famotidine)    4. Off Pepto-Bismol for 4 (four) weeks prior to collecting stool for H. Pylori Antigen.

## 2020-12-08 NOTE — TELEPHONE ENCOUNTER
----- Message from Nilesh Ballard PharmD sent at 12/8/2020  5:49 PM CST -----  Regarding: RE: Talicia  Ok, no they only come in capsules. I will have the PA team work on getting authorization.   ----- Message -----  From: Jagruti Sweeney MD  Sent: 12/8/2020   5:25 PM CST  To: Jagruti Sweeney MD, Nilesh Ballard, [place]  Subject: RE: Talicia                                      Is rifabutin available at 50mg like in Talicia? I do not see it in epic, only 150 and 300.  Can we cut the 150? If not can you do a prior authorization for Talicia. She failed all other regimens    Sincerely,   Dr. Sweeney        ----- Message -----  From: Nilesh Ballard PharmD  Sent: 12/8/2020   1:25 PM CST  To: Jagruti Sweeney MD, [facility] Staff  Subject: Talicia                                          Good afternoon,    Mrs. Kaiser's immunization does not cover Talicia. If you would like to send over the medication not as a combination drug, we will be able to fill the medications under her insurance. If you have any questions please call ext [phone].    Thank you,  Nilesh Ballard, [place]

## 2020-12-09 ENCOUNTER — TELEPHONE (OUTPATIENT)
Dept: ENDOSCOPY | Facility: HOSPITAL | Age: 50
End: 2020-12-09

## 2020-12-09 NOTE — TELEPHONE ENCOUNTER
Called patient to confirm she had the instructions for procedure on Monday 12/14/20. No answer and left voicemail explaining instructions were already mailed to her house and sent via email.

## 2020-12-11 ENCOUNTER — PATIENT MESSAGE (OUTPATIENT)
Dept: OTHER | Facility: OTHER | Age: 50
End: 2020-12-11

## 2020-12-11 ENCOUNTER — LAB VISIT (OUTPATIENT)
Dept: FAMILY MEDICINE | Facility: CLINIC | Age: 50
End: 2020-12-11
Payer: MEDICARE

## 2020-12-11 DIAGNOSIS — Z01.818 PRE-OP TESTING: ICD-10-CM

## 2020-12-11 PROCEDURE — U0003 INFECTIOUS AGENT DETECTION BY NUCLEIC ACID (DNA OR RNA); SEVERE ACUTE RESPIRATORY SYNDROME CORONAVIRUS 2 (SARS-COV-2) (CORONAVIRUS DISEASE [COVID-19]), AMPLIFIED PROBE TECHNIQUE, MAKING USE OF HIGH THROUGHPUT TECHNOLOGIES AS DESCRIBED BY CMS-2020-01-R: HCPCS | Mod: HCNC

## 2020-12-12 LAB — SARS-COV-2 RNA RESP QL NAA+PROBE: NOT DETECTED

## 2020-12-14 ENCOUNTER — HOSPITAL ENCOUNTER (OUTPATIENT)
Facility: HOSPITAL | Age: 50
Discharge: HOME | End: 2020-12-14
Attending: INTERNAL MEDICINE | Admitting: INTERNAL MEDICINE
Payer: MEDICARE

## 2020-12-14 ENCOUNTER — ANESTHESIA (OUTPATIENT)
Dept: ENDOSCOPY | Facility: HOSPITAL | Age: 50
End: 2020-12-14
Payer: MEDICARE

## 2020-12-14 ENCOUNTER — TELEPHONE (OUTPATIENT)
Dept: PHARMACY | Facility: CLINIC | Age: 50
End: 2020-12-14

## 2020-12-14 ENCOUNTER — ANESTHESIA EVENT (OUTPATIENT)
Dept: ENDOSCOPY | Facility: HOSPITAL | Age: 50
End: 2020-12-14
Payer: MEDICARE

## 2020-12-14 VITALS
HEART RATE: 64 BPM | OXYGEN SATURATION: 98 % | RESPIRATION RATE: 16 BRPM | HEIGHT: 64 IN | DIASTOLIC BLOOD PRESSURE: 94 MMHG | TEMPERATURE: 98 F | WEIGHT: 293 LBS | BODY MASS INDEX: 50.02 KG/M2 | SYSTOLIC BLOOD PRESSURE: 157 MMHG

## 2020-12-14 DIAGNOSIS — K21.9 GERD (GASTROESOPHAGEAL REFLUX DISEASE): ICD-10-CM

## 2020-12-14 DIAGNOSIS — K21.9 GASTROESOPHAGEAL REFLUX DISEASE, UNSPECIFIED WHETHER ESOPHAGITIS PRESENT: Primary | ICD-10-CM

## 2020-12-14 LAB
POCT GLUCOSE: 77 MG/DL (ref 70–110)
POCT GLUCOSE: 86 MG/DL (ref 70–110)

## 2020-12-14 PROCEDURE — 37000008 HC ANESTHESIA 1ST 15 MINUTES: Mod: HCNC | Performed by: INTERNAL MEDICINE

## 2020-12-14 PROCEDURE — D9220A PRA ANESTHESIA: ICD-10-PCS | Mod: HCNC,ANES,, | Performed by: ANESTHESIOLOGY

## 2020-12-14 PROCEDURE — 43239 EGD BIOPSY SINGLE/MULTIPLE: CPT | Mod: HCNC,,, | Performed by: INTERNAL MEDICINE

## 2020-12-14 PROCEDURE — 25000003 PHARM REV CODE 250: Mod: HCNC | Performed by: NURSE ANESTHETIST, CERTIFIED REGISTERED

## 2020-12-14 PROCEDURE — D9220A PRA ANESTHESIA: ICD-10-PCS | Mod: HCNC,CRNA,, | Performed by: NURSE ANESTHETIST, CERTIFIED REGISTERED

## 2020-12-14 PROCEDURE — 25000003 PHARM REV CODE 250: Mod: HCNC | Performed by: INTERNAL MEDICINE

## 2020-12-14 PROCEDURE — 91035 G-ESOPH REFLX TST W/ELECTROD: CPT | Mod: HCNC | Performed by: INTERNAL MEDICINE

## 2020-12-14 PROCEDURE — 87081 CULTURE SCREEN ONLY: CPT | Mod: HCNC

## 2020-12-14 PROCEDURE — D9220A PRA ANESTHESIA: Mod: HCNC,ANES,, | Performed by: ANESTHESIOLOGY

## 2020-12-14 PROCEDURE — 43239 PR EGD, FLEX, W/BIOPSY, SGL/MULTI: ICD-10-PCS | Mod: HCNC,,, | Performed by: INTERNAL MEDICINE

## 2020-12-14 PROCEDURE — 63600175 PHARM REV CODE 636 W HCPCS: Mod: HCNC | Performed by: NURSE ANESTHETIST, CERTIFIED REGISTERED

## 2020-12-14 PROCEDURE — 27201012 HC FORCEPS, HOT/COLD, DISP: Mod: HCNC | Performed by: INTERNAL MEDICINE

## 2020-12-14 PROCEDURE — 27200942: Mod: HCNC | Performed by: INTERNAL MEDICINE

## 2020-12-14 PROCEDURE — D9220A PRA ANESTHESIA: Mod: HCNC,CRNA,, | Performed by: NURSE ANESTHETIST, CERTIFIED REGISTERED

## 2020-12-14 PROCEDURE — 30000890 MAYO MISCELLANEOUS TEST (REFLEX): Mod: HCNC

## 2020-12-14 PROCEDURE — 37000009 HC ANESTHESIA EA ADD 15 MINS: Mod: HCNC | Performed by: INTERNAL MEDICINE

## 2020-12-14 PROCEDURE — 82962 GLUCOSE BLOOD TEST: CPT | Mod: HCNC | Performed by: INTERNAL MEDICINE

## 2020-12-14 PROCEDURE — 43239 EGD BIOPSY SINGLE/MULTIPLE: CPT | Mod: HCNC | Performed by: INTERNAL MEDICINE

## 2020-12-14 RX ORDER — FENTANYL CITRATE 50 UG/ML
25 INJECTION, SOLUTION INTRAMUSCULAR; INTRAVENOUS EVERY 5 MIN PRN
Status: DISCONTINUED | OUTPATIENT
Start: 2020-12-14 | End: 2020-12-14 | Stop reason: HOSPADM

## 2020-12-14 RX ORDER — ONDANSETRON 2 MG/ML
4 INJECTION INTRAMUSCULAR; INTRAVENOUS ONCE AS NEEDED
Status: DISCONTINUED | OUTPATIENT
Start: 2020-12-14 | End: 2020-12-14 | Stop reason: HOSPADM

## 2020-12-14 RX ORDER — SODIUM CHLORIDE 0.9 % (FLUSH) 0.9 %
10 SYRINGE (ML) INJECTION
Status: DISCONTINUED | OUTPATIENT
Start: 2020-12-14 | End: 2020-12-14 | Stop reason: HOSPADM

## 2020-12-14 RX ORDER — PROPOFOL 10 MG/ML
VIAL (ML) INTRAVENOUS
Status: DISCONTINUED | OUTPATIENT
Start: 2020-12-14 | End: 2020-12-14

## 2020-12-14 RX ORDER — LIDOCAINE HYDROCHLORIDE 20 MG/ML
INJECTION, SOLUTION EPIDURAL; INFILTRATION; INTRACAUDAL; PERINEURAL
Status: DISCONTINUED | OUTPATIENT
Start: 2020-12-14 | End: 2020-12-14

## 2020-12-14 RX ORDER — SODIUM CHLORIDE 9 MG/ML
INJECTION, SOLUTION INTRAVENOUS CONTINUOUS
Status: DISCONTINUED | OUTPATIENT
Start: 2020-12-14 | End: 2020-12-14 | Stop reason: HOSPADM

## 2020-12-14 RX ORDER — ONDANSETRON 2 MG/ML
INJECTION INTRAMUSCULAR; INTRAVENOUS
Status: DISCONTINUED | OUTPATIENT
Start: 2020-12-14 | End: 2020-12-14

## 2020-12-14 RX ORDER — DIPHENHYDRAMINE HYDROCHLORIDE 50 MG/ML
25 INJECTION INTRAMUSCULAR; INTRAVENOUS EVERY 6 HOURS PRN
Status: DISCONTINUED | OUTPATIENT
Start: 2020-12-14 | End: 2020-12-14 | Stop reason: HOSPADM

## 2020-12-14 RX ORDER — HYDROMORPHONE HYDROCHLORIDE 1 MG/ML
0.2 INJECTION, SOLUTION INTRAMUSCULAR; INTRAVENOUS; SUBCUTANEOUS EVERY 5 MIN PRN
Status: DISCONTINUED | OUTPATIENT
Start: 2020-12-14 | End: 2020-12-14 | Stop reason: HOSPADM

## 2020-12-14 RX ORDER — SODIUM CHLORIDE 0.9 % (FLUSH) 0.9 %
3 SYRINGE (ML) INJECTION
Status: DISCONTINUED | OUTPATIENT
Start: 2020-12-14 | End: 2020-12-14 | Stop reason: HOSPADM

## 2020-12-14 RX ORDER — PROPOFOL 10 MG/ML
VIAL (ML) INTRAVENOUS CONTINUOUS PRN
Status: DISCONTINUED | OUTPATIENT
Start: 2020-12-14 | End: 2020-12-14

## 2020-12-14 RX ADMIN — PROPOFOL 200 MCG/KG/MIN: 10 INJECTION, EMULSION INTRAVENOUS at 01:12

## 2020-12-14 RX ADMIN — PROPOFOL 100 MG: 10 INJECTION, EMULSION INTRAVENOUS at 01:12

## 2020-12-14 RX ADMIN — SODIUM CHLORIDE 150 ML: 0.9 INJECTION, SOLUTION INTRAVENOUS at 01:12

## 2020-12-14 RX ADMIN — SODIUM CHLORIDE 50 ML: 0.9 INJECTION, SOLUTION INTRAVENOUS at 01:12

## 2020-12-14 RX ADMIN — LIDOCAINE HYDROCHLORIDE 30 MG: 20 INJECTION, SOLUTION EPIDURAL; INFILTRATION; INTRACAUDAL at 01:12

## 2020-12-14 RX ADMIN — SODIUM CHLORIDE 10 ML/HR: 0.9 INJECTION, SOLUTION INTRAVENOUS at 12:12

## 2020-12-14 RX ADMIN — ONDANSETRON 4 MG: 2 INJECTION INTRAMUSCULAR; INTRAVENOUS at 01:12

## 2020-12-14 RX ADMIN — LIDOCAINE HYDROCHLORIDE 100 MG: 20 INJECTION, SOLUTION EPIDURAL; INFILTRATION; INTRACAUDAL at 01:12

## 2020-12-14 RX ADMIN — PROPOFOL 150 MCG/KG/MIN: 10 INJECTION, EMULSION INTRAVENOUS at 01:12

## 2020-12-14 NOTE — ANESTHESIA POSTPROCEDURE EVALUATION
Anesthesia Post Evaluation    Patient: Yanni Kaiser    Procedure(s) Performed: Procedure(s) (LRB):  ESOPHAGOGASTRODUODENOSCOPY (EGD) with BRAVO (N/A)  PH MONITORING, ESOPHAGUS, WIRELESS, (OFF REFLUX MEDS) (N/A)    Final Anesthesia Type: general    Patient location during evaluation: [facility]  Patient participation: Yes- Able to Participate  Level of consciousness: awake and alert and oriented  Post-procedure vital signs: reviewed and stable  Pain management: adequate  Airway patency: patent    PONV status at discharge: No PONV  Anesthetic complications: no      Cardiovascular status: hemodynamically stable and blood pressure returned to baseline  Respiratory status: room air, unassisted and spontaneous ventilation  Hydration status: euvolemic  Follow-up not needed.          Vitals Value Taken Time   /94 12/14/20 1447   Temp 36.7 °C (98.1 °F) 12/14/20 1445   Pulse 62 12/14/20 1453   Resp 30 12/14/20 1452   SpO2 79 % 12/14/20 1453   Vitals shown include unvalidated device data.      No case tracking events are documented in the log.      Pain/Nida Score: Nida Score: 10 (12/14/2020  3:02 PM)

## 2020-12-14 NOTE — TELEPHONE ENCOUNTER
Patient was unable to answer because she was getting her procedure please call her and let her know about the fact that her recent prescription get authorized.  Please make sure that she knows exactly how to take this.

## 2020-12-14 NOTE — ANESTHESIA PREPROCEDURE EVALUATION
12/14/2020  Pre-operative evaluation for Procedure(s) (LRB):  ESOPHAGOGASTRODUODENOSCOPY (EGD) with BRAVO (N/A)  PH MONITORING, ESOPHAGUS, WIRELESS, (OFF REFLUX MEDS) (N/A)    Yanni Kaiser is a 50 y.o. female     Patient Active Problem List   Diagnosis    SHARATH (obstructive sleep apnea)    Leg varices    Migraine    Morbid obesity with BMI of 50.0-59.9, adult    Cigarette nicotine dependence without complication    Anemia of chronic disease    Mild protein malnutrition    Idiopathic intracranial hypertension    Essential hypertension    Hyperlipidemia    GERD (gastroesophageal reflux disease)    Epilepsy    Plantar fasciitis, bilateral    Type 2 diabetes mellitus with stage 3 chronic kidney disease, with long-term current use of insulin    Recurrent major depressive disorder, in full remission    History of suicide attempt    Opioid dependence    Lumbar stenosis    S/P D&C (status post dilation and curettage)    Simple endometrial hyperplasia    Refractive error    Dysphagia    Plantar fasciitis    Influenza A    Stage 3 chronic kidney disease    Decreased strength of trunk and back    Muscle tightness    Chronic pain disorder    DDD (degenerative disc disease), lumbar    Chronic bilateral low back pain without sciatica    Tobacco abuse    Chronic pain    Decreased strength of lower extremity       Review of patient's allergies indicates:   Allergen Reactions    Gabapentin      Makes her twitch       No current facility-administered medications on file prior to encounter.      Current Outpatient Medications on File Prior to Encounter   Medication Sig Dispense Refill    albuterol (PROVENTIL/VENTOLIN HFA) 90 mcg/actuation inhaler Inhale 2 puffs into the lungs every 6 (six) hours as needed. Rescue 54 g 0    albuterol-ipratropium (DUO-NEB) 2.5 mg-0.5 mg/3 mL nebulizer  solution Take 3 mLs by nebulization every 6 (six) hours as needed for Wheezing or Shortness of Breath. Rescue 100 mL 3    atorvastatin (LIPITOR) 40 MG tablet Take 1 tablet (40 mg total) by mouth once daily. 90 tablet 3    blood sugar diagnostic Strp 1 each by Misc.(Non-Drug; Combo Route) route 4 (four) times daily before meals and nightly. ACCU CHEK ELVIA PLUS METER 200 each 3    blood-glucose meter (ACCU-CHEK ELVIA PLUS METER) Misc TEST FOUR TIMES DAILY BEFORE MEALS AND EVERY NIGHT 90 each 1    budesonide-formoterol 160-4.5 mcg (SYMBICORT) 160-4.5 mcg/actuation HFAA Inhale 2 puffs into the lungs every 12 (twelve) hours. Controller 1 Inhaler 5    galcanezumab-gnlm (EMGALITY PEN) 120 mg/mL PnIj Inject 120 mg into the skin every 28 days. 1 mL 5    lancets (ACCU-CHEK SOFTCLIX LANCETS) Misc 1 Device by Misc.(Non-Drug; Combo Route) route 4 (four) times daily. 200 each 3    lidocaine (LIDODERM) 5 % Place 1 patch onto the skin once daily. Remove & Discard patch within 12 hours or as directed by MD 15 patch 0    ondansetron (ZOFRAN) 8 MG tablet Take 1 tablet (8 mg total) by mouth every 8 (eight) hours as needed for Nausea. 90 tablet 11    pantoprazole (PROTONIX) 40 MG tablet Take 40 mg by mouth once daily.   3    semaglutide (OZEMPIC) 1 mg/dose (2 mg/1.5 mL) PnIj Inject 1 mg subq weekly. 9 mL 1    sumatriptan (IMITREX) 50 MG tablet Take 1 tab at onset of headaches.  If no improvement in 2 hours, take another.  Do not take more than 2 tabs in 24 hours. 9 tablet 5       Past Surgical History:   Procedure Laterality Date    BREAST CYST ASPIRATION       SECTION      X 1    CHOLECYSTECTOMY      COLONOSCOPY      COLONOSCOPY N/A 2019    Procedure: COLONOSCOPY;  Surgeon: Jagruti Sweeney MD;  Location: [address]);  Service: Endoscopy;  Laterality: N/A;  BMI is 63/gastroparesis/3 days full liquid diet 1 day clear liquid see telephone encounter by Ciara limon    EPIDURAL STEROID  INJECTION N/A 9/11/2020    Procedure: INJECTION, STEROID, EPIDURAL, L5-S1 IL;  Surgeon: Lawrence Marin MD;  Location: [facility] PAIN MGT;  Service: Pain Management;  Laterality: N/A;    ESOPHAGEAL MANOMETRY WITH MEASUREMENT OF IMPEDANCE N/A 11/5/2019    Procedure: MANOMETRY-ESOPHAGEAL-WITH IMPEDANCE;  Surgeon: Jagruti Sweeney MD;  Location: [facility] ENDO (2ND FLR);  Service: Endoscopy;  Laterality: N/A;  Hold Narcotics x 1 days   Hold TCA x 1 days  Propofol only. No fentanyl or benzodiazepine during sedation. If additional sedation needed, discuss with Dr. Sweeney.  10/29 - pt confirmed appt    ESOPHAGOGASTRODUODENOSCOPY N/A 1/14/2019    Procedure: EGD (ESOPHAGOGASTRODUODENOSCOPY);  Surgeon: Jagruti Sweeney MD;  Location: [facility] (2ND FLR);  Service: Endoscopy;  Laterality: N/A;  BMI is 63/gastroparesis/3 days full liquid diet 1 day clear liquid see telephone encounter by Ciara Brown     ESOPHAGOGASTRODUODENOSCOPY N/A 11/5/2019    Procedure: ESOPHAGOGASTRODUODENOSCOPY (EGD);  Surgeon: Jagruti Sweeney MD;  Location: [facility] (2ND FLR);  Service: Endoscopy;  Laterality: N/A;  EGD with EndoFlip /+/- Botox  2nd floor for gastroparesis/BMI 63 **367lbs**  Bariatric Stretcher needed  Manometry probe to be placed endoscopically during EGD procedure  Due to change in protocol, Full liquid diet for 3 days/ 1 day of clear liquids  Hi    FOOT FRACTURE SURGERY Left     HYSTEROSCOPY WITH DILATION AND CURETTAGE OF UTERUS N/A 10/16/2018    [facility]    INJECTION OF ANESTHETIC AGENT AROUND NERVE Bilateral 10/23/2020    Procedure: BLOCK, NERVE  bilateral L3,4,5 MBB;  Surgeon: Lawrence Marin MD;  Location: [facility] PAIN MGT;  Service: Pain Management;  Laterality: Bilateral;  bilateral L3,4,5 MBB   consent needed    PLANTAR FASCIOTOMY Left 11/18/2019    Procedure: FASCIOTOMY, PLANTAR;  Surgeon: Valentino Orourke DPM;  Location: [facility] OR 2ND FLR;  Service: Podiatry;  Laterality: Left;    RETINAL LASER PROCEDURE Left     SINUS  SURGERY      TRANSFORAMINAL EPIDURAL INJECTION OF STEROID Bilateral 6/24/2020    Procedure: INJECTION, STEROID, EPIDURAL, TRANSFORAMINAL APPROACH;  Surgeon: Lawrence Marin MD;  Location: [facility];  Service: Pain Management;  Laterality: Bilateral;    UPPER GASTROINTESTINAL ENDOSCOPY         Social History     Socioeconomic History    Marital status:      Spouse name: Not on file    Number of children: Not on file    Years of education: Not on file    Highest education level: Not on file   Occupational History    Not on file   Social Needs    Financial resource strain: Very hard    Food insecurity     Worry: Often true     Inability: Sometimes true    Transportation needs     Medical: Yes     Non-medical: Yes   Tobacco Use    Smoking status: Current Every Day Smoker     Packs/day: 1.00     Years: 27.00     Pack years: 27.00     Types: Cigarettes     Start date: 1/1/1992    Smokeless tobacco: Never Used    Tobacco comment: 1/2 a pack if her days are good   Substance and Sexual Activity    Alcohol use: Not Currently     Alcohol/week: 0.0 standard drinks     Frequency: Never     Drinks per session: Patient refused     Binge frequency: Never     Comment: socially    Drug use: No    Sexual activity: Yes     Partners: Male     Birth control/protection: None     Comment:    Lifestyle    Physical activity     Days per week: 0 days     Minutes per session: 0 min    Stress: Very much   Relationships    Social connections     Talks on phone: Once a week     Gets together: Never     Attends Sabianist service: More than 4 times per year     Active member of club or organization: Yes     Attends meetings of clubs or organizations: More than 4 times per year     Relationship status:    Other Topics Concern    Not on file   Social History Narrative    Not on file             Anesthesia Evaluation    I have reviewed the Patient Summary Reports.    I have reviewed the Nursing Notes. I  have reviewed the NPO Status.      Review of Systems  Anesthesia Hx:  No problems with previous Anesthesia    Cardiovascular:   Hypertension    Pulmonary:   Sleep Apnea    Renal/:   Chronic Renal Disease, CRI    Hepatic/GI:   GERD    Musculoskeletal:   Arthritis     Neurological:   Seizures    Endocrine:   Diabetes        Physical Exam  General:  Well nourished    Airway/Jaw/Neck:  Airway Findings: Mouth Opening: Normal Tongue: Normal  General Airway Assessment: Adult  Mallampati: II  TM Distance: Normal, at least 6 cm       Chest/Lungs:  Chest/Lungs Findings: Normal Respiratory Rate     Heart/Vascular:  Heart Findings: Rate: Normal             Anesthesia Plan  Type of Anesthesia, risks & benefits discussed:  Anesthesia Type:  general, MAC  Patient's Preference:   Intra-op Monitoring Plan: standard ASA monitors  Intra-op Monitoring Plan Comments:   Post Op Pain Control Plan: multimodal analgesia and IV/PO Opioids PRN  Post Op Pain Control Plan Comments:   Induction:   IV  Beta Blocker:         Informed Consent: Patient understands risks and agrees with Anesthesia plan.  Questions answered. Anesthesia consent signed with patient.  ASA Score: 3     Day of Surgery Review of History & Physical:            Ready For Surgery From Anesthesia Perspective.

## 2020-12-14 NOTE — TRANSFER OF CARE
"Anesthesia Transfer of Care Note    Patient: Yanni Kaiser    Procedure(s) Performed: Procedure(s) (LRB):  ESOPHAGOGASTRODUODENOSCOPY (EGD) with BRAVO (N/A)  PH MONITORING, ESOPHAGUS, WIRELESS, (OFF REFLUX MEDS) (N/A)    Patient location: PACU    Anesthesia Type: general    Transport from OR: Transported from OR on room air with adequate spontaneous ventilation    Post pain: adequate analgesia    Post assessment: no apparent anesthetic complications and tolerated procedure well    Post vital signs: stable    Level of consciousness: awake, alert and oriented    Nausea/Vomiting: no nausea/vomiting    Complications: none    Transfer of care protocol was followed      Last vitals:   Visit Vitals  BP (!) 148/68 (Patient Position: Lying)   Pulse 68   Temp 36.7 °C (98 °F) (Temporal)   Resp 14   Ht 5' 4" (1.626 m)   Wt (!) 153.3 kg (338 lb)   LMP 08/17/2019   SpO2 100%   Breastfeeding No   BMI 58.02 kg/m²     "

## 2020-12-15 NOTE — PROGRESS NOTES
"   INTERVAL HISTORY:    03/24/2020:  The patient has not presented any new complaints since the previous visit.   She was admitted for flu this year with flu/asthma exacerbation. Otherwise, her asthma is controlled.  Lost 40 lbs since last time. Sleepiness has improving - occasional sleepiness in the afternoon after a busy morning.   Denied drowsy driving.  Her HTN is hard to contol - her PMD sent her to re-visit SHARATH treatment.  Not sure of snoring, gasping - sleeps alone. I have discussed her previous study results from [facility] today. The patient has lost 40 lbs since that time (seeing Dr. Salcedo, taking Adipex).  She is still on the "weigh loss curve". Reports improved sleep quality, and only occasional afternoon drowsiness.  She was still referred to the sleep clinic due to hard to control HTN.     EPWORTH SLEEPINESS SCALE 3/20/2020   Sitting and reading 3   Watching TV 1   Sitting, inactive in a public place (e.g. a theatre or a meeting) 1   As a passenger in a car for an hour without a break 1   Lying down to rest in the afternoon when circumstances permit 1   Sitting and talking to someone 0   Sitting quietly after a lunch without alcohol 1   In a car, while stopped for a few minutes in traffic 1   Total score 9           08/21/2018 INITIAL HISTORY OF PRESENT ILLNESS:  Yanni Kaiser is a 49 y.o. female is here to be evaluated for a sleep disorder.       CHIEF COMPLAINT:      The patient's complaints include excessive daytime sleepiness, excessive daytime fatigue, snoring,  witnessed breathing pauses,  gasping for air in sleep and interrupted sleep.  She was fisrt diagnosed with SHARATH over 10 years ago; most recent study 2-3 years ago in [place] ([facility] She could not tolerate CPAp during titration and did not follow up.  Still reports significant daytime sleepiness. At some point she saw Dr. Harrell - for asthma. PSG was requested from [facility], but I do not see it in [facility].  The " Subjective:      Patient ID: Mary Vo is a 66 y.o. female.    Chief Complaint: Cervical Spine Pain (C-spine)    HPI    The patient is here today for follow-up regarding cervical spine pain.   She had a cervical injection in September (C7/T1 IL KATYA).   She reports that the tingling has improved in her fingers as well as the neck pain.    Her pain is localized on the R side of her neck and usually stops at the R shoulder.   Prior to injection in September she did have radiating arm pain.  No falls or other injuries.    Patient has no pain at this time.  Denies back and leg pain.  Takes Norco as needed.        From previous notes-  She is referred to us by Dr. Lobato.  Patient states that she started having neck pain about 2-3 years ago.   Her pain is localized on both sides of her neck and arms. It is worse on the L side.  She has numbness of her hands, worse on the L, also diagnosed with CTS.  Does report weakness and decreased  strength.   Currently taking Gabapentin and Norco, prescribed by Dr. Lobato.  Patient did have injections for her neck pain.   No previous neck surgery.  Rates her pain 7/10 today.   Previous Therapy:  Medications:  Mobic, Norco, tramadol, gabapentin  Injections:                - Left L3 transforaminal epidural steroid injection on 11-6-18 with 80% pain relief               - Right C5/6 TF KATYA with RN IV sedation on 9/17/19 with 80% pain relief              - C7/T1 IL KATYA  Surgeries:  Bilateral hip replacements, no lumbar surgery  Physical Therapy:  Has participated in physical therapy and aquatherapy over 1 year ago.       Review of Systems   Constitution: Negative for fever.   HENT: Negative for congestion.    Respiratory: Negative for cough and wheezing.    Skin: Negative for poor wound healing.   Musculoskeletal: Positive for neck pain. Negative for back pain, falls, joint pain, muscle weakness and myalgias.   Gastrointestinal: Negative for abdominal pain, bowel incontinence,  diarrhea, nausea and vomiting.   Genitourinary: Negative for bladder incontinence.   Neurological: Positive for numbness (R hand only). Negative for seizures.   Psychiatric/Behavioral: Negative for altered mental status.         Objective:            Ortho/SPM Exam  Nursing note and vitals reviewed  Gen:Oriented to person, place, and time.             Appears stated age   Head:Normocephalic and atraumatic.  Nose: Nose normal.    Eyes: EOM are normal. Pupils are equal, round, and reactive to light.   Neck: Neck supple. No masses or lesions palpated  Cardiovascular: Intact distal pulses.    Abdominal: Soft.   Neurological: Alert and oriented to person, place, and time.  No cranial nerve deficit.  Coordination normal. Normal muscle tone  Psychiatric: Normal mood and affect. Behavior is normal.        Neck:  None  Paraspinal tenderness   None  Paraspinal muscle spasms   None present Pain with flexion and extention   WNL  Range of motion shoulders   Neg Not tested Spurling's sign     Motor:   Right Right Left Left  Level Group   5 4+ 5 4+ C5 Deltoid   5 4+ 5 4+ C6 Bicep   5  5   Wrist extension    5  5  C7 Triceps   5  5   Wrist flexion   5  5  C8    5  5  T1 Interossei      Sensation:  NL Decreased (R/L/BL) Level Sensation    X  C5 Lateral upper arm   X  C6 Thumb and index finger, lat forearm   X  C7 Middle finger   X  C8 Ring and little finger   X  T1 Medial arm      Reflex:  2+  Bicep tendon   2+  Brachioradialis   2+  Triceps tendon   Not present  +Right Coburn's   none  Clonus   neg  Tinel's                  COMPARISON:  09/29/2020 radiograph; MRI cervical spine 08/22/2019     FINDINGS:  Vertebral body heights and alignment are unchanged with minimal retrolisthesis of C3 on C4.  No concerning marrow signal present.  Similar multilevel disc desiccation and height loss most prevalent at C5-6 and C6-7.     Posterior fossa is unremarkable.  Spinal cord is unremarkable.     Neck soft tissue structures  patient is very reluctant to repeat a sleep study (found the experience traumatic), and would like us to keep trying to obtain the results from [facility].       EPWORTH SLEEPINESS SCALE 8/21/2018   Sitting and reading 3   Watching TV 2   Sitting, inactive in a public place (e.g. a theatre or a meeting) 1   As a passenger in a car for an hour without a break 3   Lying down to rest in the afternoon when circumstances permit 2   Sitting and talking to someone 1   Sitting quietly after a lunch without alcohol 3   In a car, while stopped for a few minutes in traffic 2   Total score 17       SLEEP ROUTINE AND LIFESTYLE 03/24/2020 :  Sleep Clinic New Patient 8/21/2018   What time do you go to bed on a week day? (Give a range) 930-10pm   What time do you go to bed on a day off? (Give a range) 930-10pm   How long does it take you to fall asleep? (Give a range) instant-never   On average, how many times per night do you wake up? 3-4   How long does it take you to fall back into sleep? (Give a range) instant   What time do you wake up to start your day on a week day? (Give a range) 4am   What time do you wake up to start your day on a day off? (Give a range) 7-8am   What time do you get out of bed? (Give a range) 7-8   On average, how many hours do you sleep? 4-5   On average, how many naps do you take per day? 0   Rate your sleep quality from 0 to 5 (0-poor, 5-great). 3   Have you experienced:  Weight gain?   How much weight have you lost or gained (in lbs.) in the last year? 10   On average, how many times per night do you go to the bathroom?  3   Have you ever had a sleep study/CPAP machine/surgery for sleep apnea? Yes   Have you ever had a CPAP machine for sleep apnea? Yes   Have you ever had surgery for sleep apnea? No       Sleep Clinic ROS  8/21/2018   Difficulty breathing through the nose?  Sometimes   Sore throat or dry mouth in the morning? Yes   Irregular or very fast heart beat?  No   Shortness of breath?  Yes    Acid reflux? Yes   Body aches and pains?  Yes   Morning headaches? Yes   Dizziness? Yes   Mood changes?  Yes   Do you exercise?  No   Do you feel like moving your legs a lot?  Yes           Social History:      Occupation:  Bed partner:        PREVIOUS SLEEP STUDIES:   PSG 6/1/15: Significant Obstructive sleep apnea (SHARATH) with AHI (apnea hypopnea Index) of 27 and SaO2 of 71% (weight  n/a).  ([facility])      DME:       PAST MEDICAL HISTORY:    Active Ambulatory Problems     Diagnosis Date Noted    SHARATH (obstructive sleep apnea) 03/27/2014    Leg varices 03/27/2014    Migraine 09/03/2014    Morbid obesity with BMI of 50.0-59.9, adult 09/03/2014    Cigarette nicotine dependence without complication 09/03/2014    Anemia of chronic disease 06/10/2015    Mild protein malnutrition 06/10/2015    Idiopathic intracranial hypertension 02/04/2016    Essential hypertension 02/04/2016    Hyperlipidemia 07/30/2016    GERD (gastroesophageal reflux disease) 07/30/2016    Epilepsy 07/30/2016    Plantar fasciitis, bilateral 01/11/2017    Type 2 diabetes mellitus with stage 3 chronic kidney disease, with long-term current use of insulin 03/08/2017    Recurrent major depressive disorder, in full remission 01/22/2018    History of suicide attempt 08/21/2018    Opioid dependence 09/04/2018    Lumbar stenosis 09/04/2018    Antalgic gait 10/04/2018    S/P D&C (status post dilation and curettage) 10/16/2018    Simple endometrial hyperplasia 01/01/2018    Refractive error 03/26/2019    Dysphagia 11/05/2019    Plantar fasciitis 11/18/2019    Influenza A 01/16/2020    Acute respiratory distress 01/16/2020    Stage 3 chronic kidney disease 01/16/2020    Foot pain 02/26/2020    Decreased strength, endurance, and mobility 02/26/2020    Ankle weakness 02/26/2020    Tightness of left gastrocnemius muscle 02/26/2020     Resolved Ambulatory Problems     Diagnosis Date Noted    Body mass index 60.0-69.9, adult  unremarkable.     C2-C3: No significant spinal canal stenosis or neural foraminal narrowing.     C3-C4: Similar posterior disc osteophyte complex present effacing the anterior thecal sac without significant spinal canal stenosis.  Bilateral uncovertebral degenerative changes resulting in mild-to-moderate neural foraminal stenosis.     C4-C5: Posterior disc osteophyte complex present with right paracentral disc protrusion which contacts the anterior right aspect of the cervical cord.  Right greater than left facet/uncovertebral hypertrophy resulting mild right neural foraminal narrowing.     C5-C6: Posterior disc osteophyte complex slightly asymmetric to the right paracentral location effacing the anterior thecal sac and causing mild spinal cord flattening.  No intrinsic cord signal abnormality.  Spinal canal measures 7.6 mm in midline AP dimension.  Bilateral uncovertebral/facet degenerative findings causes severe bilateral neural foraminal narrowing.     C6-C7: Posterior disc osteophyte complex present with more focal central disc protrusion causing similar effacement and mild indention of the thecal sac and spinal cord without intrinsic cord signal abnormality.  Spinal canal measures 8.3 mm in AP midline dimension.  Bilateral moderate to severe neural foraminal narrowing present.     C7-T1: Mild posterior disc osteophyte complex present mildly effacing the anterior thecal sac without significant spinal stenosis.  Mild-to-moderate bilateral neural foraminal narrowing.     T1-2: Posterior disc osteophyte changes present causing at least mild spinal canal stenosis with severe right and moderate left neural foraminal narrowing.     Impression:     Similar multilevel degenerative findings as above    Assessment:       Encounter Diagnoses   Name Primary?    DDD (degenerative disc disease), cervical Yes    Cervical radiculopathy           Plan:       Mary was seen today for cervical spine pain  (c-spine).    Diagnoses and all orders for this visit:    DDD (degenerative disc disease), cervical    Cervical radiculopathy          Patient is doing fair following conservative treatment.  She wants to hold off any other treatments at this time.  Patient is advised to follow-up in 2 months if her symptoms worsen.  In the event, will get CT of cervical spine for surgical planning prior to the appointment.            Miesha Linn PA-C       2014    Essential hypertension, benign 2014    Mild intermittent asthma 2014    Body mass index 60.0-69.9, adult 2014    Type 2 diabetes mellitus with renal manifestations, controlled 2014    Chronic kidney disease, stage II (mild) 2014    Seizure disorder 2014    Type 2 diabetes mellitus, controlled 2014    Exacerbation of asthma 2015    Acute chest pain 2016    Altered mental status 2016    Seizure 2016    Papilledema associated with increased intracranial pressure 2017    Diabetic ketoacidosis without coma associated with type 2 diabetes mellitus 2017    DUSTIN (acute kidney injury) 2017    Numbness of right hand 2017    Localz-rltd symptomatic epilepsy w cmplx part sz, intract, wo status 07/10/2017    Complex partial epilepsy, intractable 2017    Simple chronic bronchitis 2018    Asthma with severe exacerbation 2018    Acute respiratory failure with hypoxia 2018    DUB (dysfunctional uterine bleeding) 10/16/2018    Moderate asthma with acute exacerbation 2020     Past Medical History:   Diagnosis Date    Allergy     Anemia     Asthma     Back pain     Chronic bronchitis     Cigarette smoker     Depression     Diabetes with neurologic complications     High cholesterol     Hyperprolactinemia     Hypertension     Obese body habitus     Pseudotumor cerebri     Renal manifestation of secondary diabetes mellitus     Respiratory failure     Seizures     Sleep apnea     Smoker     Tobacco dependence                 PAST SURGICAL HISTORY:    Past Surgical History:   Procedure Laterality Date    BREAST CYST ASPIRATION       SECTION      X 1    CHOLECYSTECTOMY      COLONOSCOPY      COLONOSCOPY N/A 2019    Procedure: COLONOSCOPY;  Surgeon: Jagruti Sweeney MD;  Location[facility] ([address] [place] [address]);  Service: Endoscopy;  Laterality: N/A;  BMI is  63/gastroparesis/3 days full liquid diet 1 day clear liquid see telephone encounter by Ciara Brown     ESOPHAGEAL MANOMETRY WITH MEASUREMENT OF IMPEDANCE N/A 11/5/2019    Procedure: MANOMETRY-ESOPHAGEAL-WITH IMPEDANCE;  Surgeon: Jagruti Sweeney MD;  [facility]  Service: Endoscopy;  Laterality: N/A;  Hold Narcotics x 1 days   Hold TCA x 1 days  Propofol only. No fentanyl or benzodiazepine during sedation. If additional sedation needed, discuss with Dr. Sweeney.  10/29 - pt confirmed appt    ESOPHAGOGASTRODUODENOSCOPY N/A 1/14/2019    Procedure: EGD (ESOPHAGOGASTRODUODENOSCOPY);  Surgeon: Jagruti Sweeney MD;  Location: [facility] (2ND FLR);  Service: Endoscopy;  Laterality: N/A;  BMI is 63/gastroparesis/3 days full liquid diet 1 day clear liquid see telephone encounter by Ciara Brown     ESOPHAGOGASTRODUODENOSCOPY N/A 11/5/2019    Procedure: ESOPHAGOGASTRODUODENOSCOPY (EGD);  Surgeon: Jagruti Sweeney MD;  Location: [facility] ([address] [place] [address]);  Service: Endoscopy;  Laterality: N/A;  EGD with EndoFlip /+/- Botox  2nd floor for gastroparesis/BMI 63 **367lbs**  Bariatric Stretcher needed  Manometry probe to be placed endoscopically during EGD procedure  Due to change in protocol, Full liquid diet for 3 days/ 1 day of clear liquids  Hi    FOOT FRACTURE SURGERY Left     HYSTEROSCOPY WITH DILATION AND CURETTAGE OF UTERUS N/A 10/16/2018    K[facility]    PLANTAR FASCIOTOMY Left 11/18/2019    Procedure: FASCIOTOMY, PLANTAR;  Surgeon: Valentino Orourke DPM;  Location[facility] OR [address] [place] [address];  Service: Podiatry;  Laterality: Left;    RETINAL LASER PROCEDURE Left     SINUS SURGERY      UPPER GASTROINTESTINAL ENDOSCOPY           FAMILY HISTORY:                Family History   Problem Relation Age of Onset    Hypertension Mother     Diabetes Mother     Colon cancer Mother 50    Colon polyps Mother     Heart disease Father     COPD Father     Heart attack Father     Hypertension Father     Ulcers  Father     Cancer Maternal Grandmother     Breast cancer Maternal Grandmother     Colon cancer Maternal Grandmother     Colon polyps Maternal Grandmother     No Known Problems Paternal Grandmother     No Known Problems Brother     No Known Problems Maternal Aunt     No Known Problems Maternal Uncle     No Known Problems Paternal Aunt     No Known Problems Paternal Uncle     No Known Problems Maternal Grandfather     No Known Problems Paternal Grandfather     Celiac disease Neg Hx     Cirrhosis Neg Hx     Crohn's disease Neg Hx     Cystic fibrosis Neg Hx     Esophageal cancer Neg Hx     Hemochromatosis Neg Hx     Inflammatory bowel disease Neg Hx     Irritable bowel syndrome Neg Hx     Liver cancer Neg Hx     Liver disease Neg Hx     Rectal cancer Neg Hx     Stomach cancer Neg Hx     Ulcerative colitis Neg Hx     Jonnie's disease Neg Hx     Tuberculosis Neg Hx     Lymphoma Neg Hx     Scleroderma Neg Hx     Rheum arthritis Neg Hx     Melanoma Neg Hx     Multiple sclerosis Neg Hx     Psoriasis Neg Hx     Lupus Neg Hx     Skin cancer Neg Hx     Amblyopia Neg Hx     Blindness Neg Hx     Cataracts Neg Hx     Glaucoma Neg Hx     Macular degeneration Neg Hx     Retinal detachment Neg Hx     Strabismus Neg Hx     Stroke Neg Hx     Thyroid disease Neg Hx        SOCIAL HISTORY:          Tobacco:   Social History     Tobacco Use   Smoking Status Current Every Day Smoker    Packs/day: 1.00    Years: 27.00    Pack years: 27.00    Types: Cigarettes    Start date: 1/1/1992   Smokeless Tobacco Never Used   Tobacco Comment    1/2 a pack if her days are good       alcohol use:    Social History     Substance and Sexual Activity   Alcohol Use Yes    Alcohol/week: 0.0 standard drinks    Frequency: Never    Comment: socially                   ALLERGIES:  Review of patient's allergies indicates:  No Known Allergies    CURRENT MEDICATIONS:    Current Outpatient Medications   Medication Sig  Dispense Refill    acetaZOLAMIDE (DIAMOX) 500 mg CpSR Take 1 capsule (500 mg total) by mouth 2 (two) times daily. 360 capsule 1    albuterol (PROVENTIL/VENTOLIN HFA) 90 mcg/actuation inhaler Inhale 2 puffs into the lungs every 6 (six) hours as needed. Rescue 54 g 0    atorvastatin (LIPITOR) 40 MG tablet Take 1 tablet (40 mg total) by mouth once daily. 90 tablet 3    blood sugar diagnostic Strp 1 each by Misc.(Non-Drug; Combo Route) route 4 (four) times daily before meals and nightly. ACCU CHEK ELVIA PLUS METER 200 each 3    blood-glucose meter (ACCU-CHEK ELVIA PLUS METER) Misc TEST FOUR TIMES DAILY BEFORE MEALS AND EVERY NIGHT 90 each 1    diclofenac sodium (VOLTAREN) 1 % Gel Apply 2 g topically once daily. 100 g 3    fluticasone propionate (FLONASE) 50 mcg/actuation nasal spray 1 spray (50 mcg total) by Each Nare route once daily. 15.8 mL 2    hydrOXYzine (ATARAX) 50 MG tablet       lactulose (CHRONULAC) 10 gram/15 mL solution TAKE 15 MLS BY MOUTH THREE TIMES DAILY AS NEEDED 473 mL 6    lancets (ACCU-CHEK SOFTCLIX LANCETS) Misc 1 Device by Misc.(Non-Drug; Combo Route) route 4 (four) times daily. 200 each 3    levocetirizine (XYZAL) 5 MG tablet Take 1 tablet (5 mg total) by mouth every evening. 90 tablet 0    levoFLOXacin (LEVAQUIN) 500 MG tablet Take 1 tablet (500 mg total) by mouth once daily. 14 tablet 0    losartan (COZAAR) 100 MG tablet TAKE 1 TABLET(100 MG) BY MOUTH EVERY DAY 90 tablet 0    meloxicam (MOBIC) 15 MG tablet TAKE 1 TABLET(15 MG) BY MOUTH DAILY AS NEEDED FOR HEADACHE 90 tablet 1    metFORMIN (GLUCOPHAGE-XR) 500 MG 24 hr tablet TAKE 2 TABLETS(1000 MG) BY MOUTH TWICE DAILY WITH MEALS 360 tablet 0    montelukast (SINGULAIR) 10 mg tablet TAKE 1 TABLET(10 MG) BY MOUTH EVERY EVENING 30 tablet 0    ondansetron (ZOFRAN) 8 MG tablet Take 1 tablet (8 mg total) by mouth every 8 (eight) hours as needed for Nausea. 90 tablet 5    oxyCODONE-acetaminophen (PERCOCET) 5-325 mg per tablet Take 1  tablet by mouth every 12 (twelve) hours as needed for Pain. 40 tablet 0    pantoprazole (PROTONIX) 40 MG tablet Take 40 mg by mouth once daily.   3    pantoprazole (PROTONIX) 40 MG tablet TAKE 1 TABLET(40 MG) BY MOUTH TWICE DAILY 60 tablet 0    prucalopride 2 mg Tab Take 1 tablet by mouth once daily. 90 tablet 3    QUEtiapine (SEROQUEL) 25 MG Tab Take 1 tablet (25 mg total) by mouth once daily. 90 tablet 0    semaglutide (OZEMPIC) 1 mg/dose (2 mg/1.5 mL) PnIj Inject 1 mg subq weekly. 9 mL 1    sumatriptan (IMITREX) 50 MG tablet Take 1 tab at onset of headaches.  If no improvement in 2 hours, take another.  Do not take more than 2 tabs in 24 hours. 9 tablet 5    topiramate (TOPAMAX) 100 MG tablet TAKE 3 TABLETS BY MOUTH TWICE DAILY 180 tablet 11    traMADol (ULTRAM-ER) 100 MG Tb24 Take 1 tablet (100 mg total) by mouth daily as needed. 14 tablet 0    venlafaxine (EFFEXOR-XR) 75 MG 24 hr capsule TAKE 1 CAPSULE(75 MG) BY MOUTH EVERY EVENING 90 capsule 3    albuterol-ipratropium (DUO-NEB) 2.5 mg-0.5 mg/3 mL nebulizer solution Take 3 mLs by nebulization every 6 (six) hours as needed for Wheezing or Shortness of Breath. Rescue 100 mL 3    budesonide-formoterol 160-4.5 mcg (SYMBICORT) 160-4.5 mcg/actuation HFAA Inhale 2 puffs into the lungs every 12 (twelve) hours. Controller 1 Inhaler 5    triamcinolone acetonide 0.1% (KENALOG) 0.1 % ointment Apply topically 2 (two) times daily. for 7 days (Patient not taking: Reported on 3/11/2020) 30 g 0     No current facility-administered medications for this visit.                       PHYSICAL EXAM:  BP (!) 158/101 (BP Location: Right arm, Patient Position: Sitting, BP Method: Large (Automatic))   Pulse 69   Ht 5' (1.524 m)   Wt (!) 147.6 kg (325 lb 6.4 oz)   BMI 63.55 kg/m²   GENERAL: Overweight body habitus, well groomed.  HEENT:   HEENT:  Conjunctivae are non-erythematous; Pupils equal, round, and reactive to light; Nose is symmetrical; Nasal mucosa is pink and  "moist; Septum is midline; Inferior turbinates are hypertrophied; Nasal airflow is diminshed; Posterior pharynx is pink; Modified Mallampati:III-IV; Posterior palate is elevated; Tonsils not visualized; Uvula is wide and elongated;Tongue is enlarged; Dentition is fair; No TMJ tenderness; Jaw opening and protrusion without click and without discomfort.  NECK: Supple. Neck circumference is 15 inches. No thyromegaly. No palpable nodes.     SKIN: On face and neck: No abrasions, no rashes, no lesions.  No subcutaneous nodules are palpable.  RESPIRATORY: Chest is clear to auscultation.  Normal chest expansion and non-labored breathing at rest.  CARDIOVASCULAR: Normal S1, S2.  No murmurs, gallops or rubs. No carotid bruits bilaterally.  No edema. No clubbing. No cyanosis.    NEURO: Oriented to time, place and person. Normal attention span and concentration. Gait normal.    PSYCH: Affect is full. Mood is normal  MUSCULOSKELETAL: Moves 4 extremities. Gait normal.         Using [facility]: pending      ASSESSMENT:    1. SHARATH (obstructive sleep apnea). The patient symptomatically has  excessive daytime sleepiness, snoring,  witnessed breathing pauses, excessive daytime fatigue, gasping for air in sleep, interrupted sleep and nocturia  with exam findings of "a crowded oral airway and elevated body mass index. The patient has medical co-morbidities of diabetes, migraine, seizure disorder which can be worsened by SHARATH. This warrants treatment.The patient's complaints include excessive daytime sleepiness, excessive daytime fatigue, snoring,  witnessed breathing pauses,  gasping for air in sleep and interrupted sleep.  She was fisrt diagnosed with SHARATH over 10 years ago; most recent study 2-3 years ago in [facility] ([facility] She could not tolerate CPAp during titration and did not follow up.  Still reports significant daytime sleepiness. At some point she saw Dr. Harrell - for asthma. PSG was requested from [facility], " but I do not see it in [facility].  The patient is very reluctant to repeat a sleep study (found the experience traumatic), and would like us to keep trying to obtain the results from [facility].           PLAN:    Will do HST requal due to 20 lbs weight loss and the fact that her study took place 5 years ago and she was not treated at that point.       More than 25 minutes of this 45 minutes visit was spent in counseling: during our discussion today, we talked about the etiology of  SHARATH (CPAP, OA for SHARATH, positional therapy, weight loss) as well as the potential ramifications of untreated sleep apnea, which could include daytime sleepiness, hypertension, heart disease and/or stroke.  We discussed potential treatment options, which could include weight loss, body positioning, continuous positive airway pressure (CPAP), or referral for surgical consideration. Meanwhile, she  is urged to avoid supine sleep, weight gain and alcoholic beverages since all of these can worsen SHARATH.     Precautions: The patient was advised to abstain from driving should he feel sleepy or drowsy.    Follow up: after the HST.    Thank you for allowing me the opportunity to participate in the care of your patient.    This visit summary will be sent to referring provider via [employer]

## 2020-12-16 ENCOUNTER — TELEPHONE (OUTPATIENT)
Dept: GASTROENTEROLOGY | Facility: CLINIC | Age: 50
End: 2020-12-16

## 2020-12-16 PROCEDURE — 91035 PR GERD TST W/ MUCOS PH ELECTROD: ICD-10-PCS | Mod: 26,HCNC,, | Performed by: INTERNAL MEDICINE

## 2020-12-16 PROCEDURE — 91035 G-ESOPH REFLX TST W/ELECTROD: CPT | Mod: 26,HCNC,, | Performed by: INTERNAL MEDICINE

## 2020-12-17 NOTE — TELEPHONE ENCOUNTER
BRAVO Results:    Abnormal pH study  Significant acid reflux  Total percent time pH less than 4 (< 4.5; <1 if PPI BID):11  Percent time pH less than 4 Upright (<8.4; <1.5 if PPI BID):17  Percent time pH less than 4 Recumbent (<3.5; 0.5 if PPI BID):6  DeMeester Score (<14.7): 36  Poor correlation between symptoms and reflux episodes    Please let patient know that the BRAVO shows    Acid reflux    Recommendation:  Continue PPI - pantoprazole 40mg BID  Follow up with Dr. Sweeney as previously requested

## 2020-12-21 ENCOUNTER — PATIENT MESSAGE (OUTPATIENT)
Dept: PAIN MEDICINE | Facility: OTHER | Age: 50
End: 2020-12-21

## 2020-12-22 ENCOUNTER — TELEPHONE (OUTPATIENT)
Dept: PAIN MEDICINE | Facility: OTHER | Age: 50
End: 2020-12-22

## 2020-12-24 LAB — MAYO MISCELLANEOUS RESULT (REF): NORMAL

## 2021-01-04 ENCOUNTER — PATIENT MESSAGE (OUTPATIENT)
Dept: ADMINISTRATIVE | Facility: HOSPITAL | Age: 51
End: 2021-01-04

## 2021-01-06 ENCOUNTER — OFFICE VISIT (OUTPATIENT)
Dept: FAMILY MEDICINE | Facility: CLINIC | Age: 51
End: 2021-01-06
Payer: MEDICARE

## 2021-01-06 VITALS
SYSTOLIC BLOOD PRESSURE: 126 MMHG | HEIGHT: 64 IN | TEMPERATURE: 99 F | DIASTOLIC BLOOD PRESSURE: 86 MMHG | WEIGHT: 293 LBS | BODY MASS INDEX: 50.02 KG/M2 | HEART RATE: 79 BPM | OXYGEN SATURATION: 96 %

## 2021-01-06 DIAGNOSIS — J45.50 SEVERE PERSISTENT ASTHMA, UNSPECIFIED WHETHER COMPLICATED: ICD-10-CM

## 2021-01-06 DIAGNOSIS — I10 BENIGN ESSENTIAL HTN: ICD-10-CM

## 2021-01-06 DIAGNOSIS — B96.89 ACUTE BACTERIAL BRONCHITIS: Primary | ICD-10-CM

## 2021-01-06 DIAGNOSIS — F33.3 SEVERE EPISODE OF RECURRENT MAJOR DEPRESSIVE DISORDER, WITH PSYCHOTIC FEATURES: ICD-10-CM

## 2021-01-06 DIAGNOSIS — J20.8 ACUTE BACTERIAL BRONCHITIS: Primary | ICD-10-CM

## 2021-01-06 PROCEDURE — 99999 PR PBB SHADOW E&M-EST. PATIENT-LVL III: ICD-10-PCS | Mod: PBBFAC,HCNC,, | Performed by: FAMILY MEDICINE

## 2021-01-06 PROCEDURE — 3074F SYST BP LT 130 MM HG: CPT | Mod: HCNC,CPTII,S$GLB, | Performed by: FAMILY MEDICINE

## 2021-01-06 PROCEDURE — 99214 PR OFFICE/OUTPT VISIT, EST, LEVL IV, 30-39 MIN: ICD-10-PCS | Mod: HCNC,25,S$GLB, | Performed by: FAMILY MEDICINE

## 2021-01-06 PROCEDURE — 99214 OFFICE O/P EST MOD 30 MIN: CPT | Mod: HCNC,25,S$GLB, | Performed by: FAMILY MEDICINE

## 2021-01-06 PROCEDURE — 3008F BODY MASS INDEX DOCD: CPT | Mod: HCNC,CPTII,S$GLB, | Performed by: FAMILY MEDICINE

## 2021-01-06 PROCEDURE — 3008F PR BODY MASS INDEX (BMI) DOCUMENTED: ICD-10-PCS | Mod: HCNC,CPTII,S$GLB, | Performed by: FAMILY MEDICINE

## 2021-01-06 PROCEDURE — 1126F AMNT PAIN NOTED NONE PRSNT: CPT | Mod: HCNC,S$GLB,, | Performed by: FAMILY MEDICINE

## 2021-01-06 PROCEDURE — 3079F PR MOST RECENT DIASTOLIC BLOOD PRESSURE 80-89 MM HG: ICD-10-PCS | Mod: HCNC,CPTII,S$GLB, | Performed by: FAMILY MEDICINE

## 2021-01-06 PROCEDURE — 3079F DIAST BP 80-89 MM HG: CPT | Mod: HCNC,CPTII,S$GLB, | Performed by: FAMILY MEDICINE

## 2021-01-06 PROCEDURE — 3074F PR MOST RECENT SYSTOLIC BLOOD PRESSURE < 130 MM HG: ICD-10-PCS | Mod: HCNC,CPTII,S$GLB, | Performed by: FAMILY MEDICINE

## 2021-01-06 PROCEDURE — 1126F PR PAIN SEVERITY QUANTIFIED, NO PAIN PRESENT: ICD-10-PCS | Mod: HCNC,S$GLB,, | Performed by: FAMILY MEDICINE

## 2021-01-06 PROCEDURE — 99999 PR PBB SHADOW E&M-EST. PATIENT-LVL III: CPT | Mod: PBBFAC,HCNC,, | Performed by: FAMILY MEDICINE

## 2021-01-06 PROCEDURE — 96372 THER/PROPH/DIAG INJ SC/IM: CPT | Mod: HCNC,S$GLB,, | Performed by: FAMILY MEDICINE

## 2021-01-06 PROCEDURE — 96372 PR INJECTION,THERAP/PROPH/DIAG2ST, IM OR SUBCUT: ICD-10-PCS | Mod: HCNC,S$GLB,, | Performed by: FAMILY MEDICINE

## 2021-01-06 RX ORDER — ALBUTEROL SULFATE 90 UG/1
2 AEROSOL, METERED RESPIRATORY (INHALATION) EVERY 6 HOURS PRN
Qty: 54 G | Refills: 0 | Status: SHIPPED | OUTPATIENT
Start: 2021-01-06 | End: 2021-03-29 | Stop reason: SDUPTHER

## 2021-01-06 RX ORDER — AZITHROMYCIN 250 MG/1
TABLET, FILM COATED ORAL
Qty: 6 TABLET | Refills: 0 | Status: SHIPPED | OUTPATIENT
Start: 2021-01-06 | End: 2021-03-05 | Stop reason: ALTCHOICE

## 2021-01-06 RX ORDER — DEXAMETHASONE SODIUM PHOSPHATE 4 MG/ML
8 INJECTION, SOLUTION INTRA-ARTICULAR; INTRALESIONAL; INTRAMUSCULAR; INTRAVENOUS; SOFT TISSUE
Status: COMPLETED | OUTPATIENT
Start: 2021-01-06 | End: 2021-01-06

## 2021-01-06 RX ORDER — LEVOCETIRIZINE DIHYDROCHLORIDE 5 MG/1
5 TABLET, FILM COATED ORAL NIGHTLY
Qty: 90 TABLET | Refills: 0 | Status: SHIPPED | OUTPATIENT
Start: 2021-01-06 | End: 2021-06-08 | Stop reason: SDUPTHER

## 2021-01-06 RX ORDER — QUETIAPINE FUMARATE 25 MG/1
25 TABLET, FILM COATED ORAL DAILY
Qty: 90 TABLET | Refills: 0 | Status: SHIPPED | OUTPATIENT
Start: 2021-01-06 | End: 2021-02-17 | Stop reason: SDUPTHER

## 2021-01-06 RX ORDER — LOSARTAN POTASSIUM 100 MG/1
100 TABLET ORAL DAILY
Qty: 90 TABLET | Refills: 0 | Status: SHIPPED | OUTPATIENT
Start: 2021-01-06 | End: 2021-06-08 | Stop reason: SDUPTHER

## 2021-01-06 RX ADMIN — DEXAMETHASONE SODIUM PHOSPHATE 8 MG: 4 INJECTION, SOLUTION INTRA-ARTICULAR; INTRALESIONAL; INTRAMUSCULAR; INTRAVENOUS; SOFT TISSUE at 04:01

## 2021-01-07 ENCOUNTER — LAB VISIT (OUTPATIENT)
Dept: LAB | Facility: HOSPITAL | Age: 51
End: 2021-01-07
Attending: FAMILY MEDICINE
Payer: MEDICARE

## 2021-01-07 DIAGNOSIS — Z79.4 TYPE 2 DIABETES MELLITUS WITH STAGE 3 CHRONIC KIDNEY DISEASE, WITH LONG-TERM CURRENT USE OF INSULIN, UNSPECIFIED WHETHER STAGE 3A OR 3B CKD: ICD-10-CM

## 2021-01-07 DIAGNOSIS — N18.30 TYPE 2 DIABETES MELLITUS WITH STAGE 3 CHRONIC KIDNEY DISEASE, WITH LONG-TERM CURRENT USE OF INSULIN, UNSPECIFIED WHETHER STAGE 3A OR 3B CKD: ICD-10-CM

## 2021-01-07 DIAGNOSIS — E11.22 TYPE 2 DIABETES MELLITUS WITH STAGE 3 CHRONIC KIDNEY DISEASE, WITH LONG-TERM CURRENT USE OF INSULIN, UNSPECIFIED WHETHER STAGE 3A OR 3B CKD: ICD-10-CM

## 2021-01-07 PROCEDURE — 83036 HEMOGLOBIN GLYCOSYLATED A1C: CPT | Mod: HCNC

## 2021-01-07 PROCEDURE — 36415 COLL VENOUS BLD VENIPUNCTURE: CPT | Mod: HCNC,PN

## 2021-01-08 ENCOUNTER — PATIENT OUTREACH (OUTPATIENT)
Dept: ADMINISTRATIVE | Facility: OTHER | Age: 51
End: 2021-01-08

## 2021-01-08 LAB
ESTIMATED AVG GLUCOSE: 111 MG/DL (ref 68–131)
HBA1C MFR BLD HPLC: 5.5 % (ref 4–5.6)

## 2021-01-11 ENCOUNTER — OFFICE VISIT (OUTPATIENT)
Dept: PODIATRY | Facility: CLINIC | Age: 51
End: 2021-01-11
Payer: MEDICARE

## 2021-01-11 VITALS
DIASTOLIC BLOOD PRESSURE: 98 MMHG | HEART RATE: 73 BPM | BODY MASS INDEX: 56.61 KG/M2 | SYSTOLIC BLOOD PRESSURE: 141 MMHG | HEIGHT: 64 IN

## 2021-01-11 DIAGNOSIS — G57.52 TARSAL TUNNEL SYNDROME OF LEFT SIDE: ICD-10-CM

## 2021-01-11 DIAGNOSIS — N18.31 TYPE 2 DIABETES MELLITUS WITH STAGE 3A CHRONIC KIDNEY DISEASE, WITH LONG-TERM CURRENT USE OF INSULIN: Primary | ICD-10-CM

## 2021-01-11 DIAGNOSIS — Q66.6 PES VALGUS: ICD-10-CM

## 2021-01-11 DIAGNOSIS — E11.22 TYPE 2 DIABETES MELLITUS WITH STAGE 3A CHRONIC KIDNEY DISEASE, WITH LONG-TERM CURRENT USE OF INSULIN: Primary | ICD-10-CM

## 2021-01-11 DIAGNOSIS — M48.061 SPINAL STENOSIS OF LUMBAR REGION WITHOUT NEUROGENIC CLAUDICATION: ICD-10-CM

## 2021-01-11 DIAGNOSIS — Z79.4 TYPE 2 DIABETES MELLITUS WITH STAGE 3A CHRONIC KIDNEY DISEASE, WITH LONG-TERM CURRENT USE OF INSULIN: Primary | ICD-10-CM

## 2021-01-11 PROCEDURE — 3077F PR MOST RECENT SYSTOLIC BLOOD PRESSURE >= 140 MM HG: ICD-10-PCS | Mod: HCNC,CPTII,S$GLB, | Performed by: PODIATRIST

## 2021-01-11 PROCEDURE — 99214 PR OFFICE/OUTPT VISIT, EST, LEVL IV, 30-39 MIN: ICD-10-PCS | Mod: HCNC,S$GLB,, | Performed by: PODIATRIST

## 2021-01-11 PROCEDURE — 3080F DIAST BP >= 90 MM HG: CPT | Mod: HCNC,CPTII,S$GLB, | Performed by: PODIATRIST

## 2021-01-11 PROCEDURE — 1125F AMNT PAIN NOTED PAIN PRSNT: CPT | Mod: HCNC,S$GLB,, | Performed by: PODIATRIST

## 2021-01-11 PROCEDURE — 1125F PR PAIN SEVERITY QUANTIFIED, PAIN PRESENT: ICD-10-PCS | Mod: HCNC,S$GLB,, | Performed by: PODIATRIST

## 2021-01-11 PROCEDURE — 99214 OFFICE O/P EST MOD 30 MIN: CPT | Mod: HCNC,S$GLB,, | Performed by: PODIATRIST

## 2021-01-11 PROCEDURE — 3008F BODY MASS INDEX DOCD: CPT | Mod: HCNC,CPTII,S$GLB, | Performed by: PODIATRIST

## 2021-01-11 PROCEDURE — 3008F PR BODY MASS INDEX (BMI) DOCUMENTED: ICD-10-PCS | Mod: HCNC,CPTII,S$GLB, | Performed by: PODIATRIST

## 2021-01-11 PROCEDURE — 3044F HG A1C LEVEL LT 7.0%: CPT | Mod: HCNC,CPTII,S$GLB, | Performed by: PODIATRIST

## 2021-01-11 PROCEDURE — 3077F SYST BP >= 140 MM HG: CPT | Mod: HCNC,CPTII,S$GLB, | Performed by: PODIATRIST

## 2021-01-11 PROCEDURE — 99999 PR PBB SHADOW E&M-EST. PATIENT-LVL IV: ICD-10-PCS | Mod: PBBFAC,HCNC,, | Performed by: PODIATRIST

## 2021-01-11 PROCEDURE — 99999 PR PBB SHADOW E&M-EST. PATIENT-LVL IV: CPT | Mod: PBBFAC,HCNC,, | Performed by: PODIATRIST

## 2021-01-11 PROCEDURE — 3080F PR MOST RECENT DIASTOLIC BLOOD PRESSURE >= 90 MM HG: ICD-10-PCS | Mod: HCNC,CPTII,S$GLB, | Performed by: PODIATRIST

## 2021-01-11 PROCEDURE — 3044F PR MOST RECENT HEMOGLOBIN A1C LEVEL <7.0%: ICD-10-PCS | Mod: HCNC,CPTII,S$GLB, | Performed by: PODIATRIST

## 2021-01-11 RX ORDER — LIDOCAINE HYDROCHLORIDE 20 MG/ML
JELLY TOPICAL
Qty: 30 ML | Refills: 2 | Status: SHIPPED | OUTPATIENT
Start: 2021-01-11 | End: 2021-06-08 | Stop reason: SDUPTHER

## 2021-01-11 RX ORDER — AMMONIUM LACTATE 12 G/100G
CREAM TOPICAL
Qty: 140 G | Refills: 11 | Status: SHIPPED | OUTPATIENT
Start: 2021-01-11

## 2021-01-18 ENCOUNTER — PATIENT MESSAGE (OUTPATIENT)
Dept: FAMILY MEDICINE | Facility: CLINIC | Age: 51
End: 2021-01-18

## 2021-01-19 ENCOUNTER — PATIENT MESSAGE (OUTPATIENT)
Dept: FAMILY MEDICINE | Facility: CLINIC | Age: 51
End: 2021-01-19

## 2021-01-20 DIAGNOSIS — G43.719 INTRACTABLE CHRONIC MIGRAINE WITHOUT AURA AND WITHOUT STATUS MIGRAINOSUS: ICD-10-CM

## 2021-01-20 RX ORDER — TOPIRAMATE 200 MG/1
600 CAPSULE, EXTENDED RELEASE ORAL DAILY
Qty: 90 CAPSULE | Refills: 1 | Status: SHIPPED | OUTPATIENT
Start: 2021-01-20 | End: 2021-06-03

## 2021-02-08 RX ORDER — OXYCODONE AND ACETAMINOPHEN 7.5; 325 MG/1; MG/1
1 TABLET ORAL 3 TIMES DAILY PRN
Qty: 90 TABLET | Refills: 0 | Status: SHIPPED | OUTPATIENT
Start: 2021-02-08 | End: 2021-03-02 | Stop reason: SDUPTHER

## 2021-02-17 DIAGNOSIS — F33.3 SEVERE EPISODE OF RECURRENT MAJOR DEPRESSIVE DISORDER, WITH PSYCHOTIC FEATURES: ICD-10-CM

## 2021-02-17 RX ORDER — QUETIAPINE FUMARATE 25 MG/1
25 TABLET, FILM COATED ORAL DAILY
Qty: 90 TABLET | Refills: 0 | Status: SHIPPED | OUTPATIENT
Start: 2021-02-17 | End: 2021-09-15 | Stop reason: SDUPTHER

## 2021-02-23 RX ORDER — ISOPROPYL ALCOHOL 70 ML/100ML
1 SWAB TOPICAL 2 TIMES DAILY
Qty: 200 EACH | Refills: 4 | COMMUNITY
Start: 2021-02-23 | End: 2023-02-09 | Stop reason: CLARIF

## 2021-02-23 RX ORDER — LANCETS
1 EACH MISCELLANEOUS 2 TIMES DAILY
Qty: 200 EACH | Refills: 4 | Status: SHIPPED | OUTPATIENT
Start: 2021-02-23 | End: 2023-02-09 | Stop reason: CLARIF

## 2021-02-24 ENCOUNTER — PATIENT MESSAGE (OUTPATIENT)
Dept: FAMILY MEDICINE | Facility: CLINIC | Age: 51
End: 2021-02-24

## 2021-02-25 ENCOUNTER — PATIENT MESSAGE (OUTPATIENT)
Dept: FAMILY MEDICINE | Facility: CLINIC | Age: 51
End: 2021-02-25

## 2021-03-01 ENCOUNTER — PATIENT OUTREACH (OUTPATIENT)
Dept: ADMINISTRATIVE | Facility: OTHER | Age: 51
End: 2021-03-01

## 2021-03-03 ENCOUNTER — OFFICE VISIT (OUTPATIENT)
Dept: BARIATRICS | Facility: CLINIC | Age: 51
End: 2021-03-03
Payer: MEDICARE

## 2021-03-03 ENCOUNTER — OFFICE VISIT (OUTPATIENT)
Dept: NEUROLOGY | Facility: CLINIC | Age: 51
End: 2021-03-03
Payer: MEDICARE

## 2021-03-03 VITALS
BODY MASS INDEX: 50.02 KG/M2 | OXYGEN SATURATION: 99 % | SYSTOLIC BLOOD PRESSURE: 104 MMHG | HEIGHT: 64 IN | DIASTOLIC BLOOD PRESSURE: 76 MMHG | WEIGHT: 293 LBS | HEART RATE: 70 BPM

## 2021-03-03 DIAGNOSIS — H93.19 TINNITUS, UNSPECIFIED LATERALITY: ICD-10-CM

## 2021-03-03 DIAGNOSIS — E11.22 TYPE 2 DIABETES MELLITUS WITH STAGE 3 CHRONIC KIDNEY DISEASE, WITH LONG-TERM CURRENT USE OF INSULIN, UNSPECIFIED WHETHER STAGE 3A OR 3B CKD: ICD-10-CM

## 2021-03-03 DIAGNOSIS — E66.01 CLASS 3 SEVERE OBESITY DUE TO EXCESS CALORIES WITH SERIOUS COMORBIDITY AND BODY MASS INDEX (BMI) OF 50.0 TO 59.9 IN ADULT: Primary | ICD-10-CM

## 2021-03-03 DIAGNOSIS — G43.009 MIGRAINE WITHOUT AURA AND WITHOUT STATUS MIGRAINOSUS, NOT INTRACTABLE: Primary | ICD-10-CM

## 2021-03-03 DIAGNOSIS — N18.30 TYPE 2 DIABETES MELLITUS WITH STAGE 3 CHRONIC KIDNEY DISEASE, WITH LONG-TERM CURRENT USE OF INSULIN, UNSPECIFIED WHETHER STAGE 3A OR 3B CKD: ICD-10-CM

## 2021-03-03 DIAGNOSIS — Z79.4 TYPE 2 DIABETES MELLITUS WITH STAGE 3 CHRONIC KIDNEY DISEASE, WITH LONG-TERM CURRENT USE OF INSULIN, UNSPECIFIED WHETHER STAGE 3A OR 3B CKD: ICD-10-CM

## 2021-03-03 PROCEDURE — 99999 PR PBB SHADOW E&M-EST. PATIENT-LVL III: CPT | Mod: PBBFAC,,, | Performed by: INTERNAL MEDICINE

## 2021-03-03 PROCEDURE — 3008F PR BODY MASS INDEX (BMI) DOCUMENTED: ICD-10-PCS | Mod: CPTII,S$GLB,, | Performed by: INTERNAL MEDICINE

## 2021-03-03 PROCEDURE — 3074F PR MOST RECENT SYSTOLIC BLOOD PRESSURE < 130 MM HG: ICD-10-PCS | Mod: CPTII,S$GLB,, | Performed by: INTERNAL MEDICINE

## 2021-03-03 PROCEDURE — 99213 PR OFFICE/OUTPT VISIT, EST, LEVL III, 20-29 MIN: ICD-10-PCS | Mod: 95,,, | Performed by: PSYCHIATRY & NEUROLOGY

## 2021-03-03 PROCEDURE — 3078F DIAST BP <80 MM HG: CPT | Mod: CPTII,S$GLB,, | Performed by: INTERNAL MEDICINE

## 2021-03-03 PROCEDURE — 3044F PR MOST RECENT HEMOGLOBIN A1C LEVEL <7.0%: ICD-10-PCS | Mod: CPTII,S$GLB,, | Performed by: INTERNAL MEDICINE

## 2021-03-03 PROCEDURE — 3074F SYST BP LT 130 MM HG: CPT | Mod: CPTII,95,, | Performed by: PSYCHIATRY & NEUROLOGY

## 2021-03-03 PROCEDURE — 99999 PR PBB SHADOW E&M-EST. PATIENT-LVL III: ICD-10-PCS | Mod: PBBFAC,,, | Performed by: INTERNAL MEDICINE

## 2021-03-03 PROCEDURE — 99213 OFFICE O/P EST LOW 20 MIN: CPT | Mod: S$GLB,,, | Performed by: INTERNAL MEDICINE

## 2021-03-03 PROCEDURE — 3078F DIAST BP <80 MM HG: CPT | Mod: CPTII,95,, | Performed by: PSYCHIATRY & NEUROLOGY

## 2021-03-03 PROCEDURE — 3078F PR MOST RECENT DIASTOLIC BLOOD PRESSURE < 80 MM HG: ICD-10-PCS | Mod: CPTII,95,, | Performed by: PSYCHIATRY & NEUROLOGY

## 2021-03-03 PROCEDURE — 99213 OFFICE O/P EST LOW 20 MIN: CPT | Mod: 95,,, | Performed by: PSYCHIATRY & NEUROLOGY

## 2021-03-03 PROCEDURE — 3044F HG A1C LEVEL LT 7.0%: CPT | Mod: CPTII,S$GLB,, | Performed by: INTERNAL MEDICINE

## 2021-03-03 PROCEDURE — 1126F PR PAIN SEVERITY QUANTIFIED, NO PAIN PRESENT: ICD-10-PCS | Mod: S$GLB,,, | Performed by: INTERNAL MEDICINE

## 2021-03-03 PROCEDURE — 99213 PR OFFICE/OUTPT VISIT, EST, LEVL III, 20-29 MIN: ICD-10-PCS | Mod: S$GLB,,, | Performed by: INTERNAL MEDICINE

## 2021-03-03 PROCEDURE — 3074F PR MOST RECENT SYSTOLIC BLOOD PRESSURE < 130 MM HG: ICD-10-PCS | Mod: CPTII,95,, | Performed by: PSYCHIATRY & NEUROLOGY

## 2021-03-03 PROCEDURE — 3074F SYST BP LT 130 MM HG: CPT | Mod: CPTII,S$GLB,, | Performed by: INTERNAL MEDICINE

## 2021-03-03 PROCEDURE — 3008F BODY MASS INDEX DOCD: CPT | Mod: CPTII,S$GLB,, | Performed by: INTERNAL MEDICINE

## 2021-03-03 PROCEDURE — 1126F AMNT PAIN NOTED NONE PRSNT: CPT | Mod: S$GLB,,, | Performed by: INTERNAL MEDICINE

## 2021-03-03 PROCEDURE — 3078F PR MOST RECENT DIASTOLIC BLOOD PRESSURE < 80 MM HG: ICD-10-PCS | Mod: CPTII,S$GLB,, | Performed by: INTERNAL MEDICINE

## 2021-03-03 RX ORDER — VENLAFAXINE HYDROCHLORIDE 75 MG/1
75 CAPSULE, EXTENDED RELEASE ORAL DAILY
Qty: 90 CAPSULE | Refills: 1 | Status: SHIPPED | OUTPATIENT
Start: 2021-03-03 | End: 2021-09-15 | Stop reason: SDUPTHER

## 2021-03-03 RX ORDER — RIMEGEPANT SULFATE 75 MG/75MG
75 TABLET, ORALLY DISINTEGRATING ORAL ONCE AS NEEDED
Qty: 12 TABLET | Refills: 2 | Status: SHIPPED | OUTPATIENT
Start: 2021-03-03 | End: 2021-03-03

## 2021-03-03 RX ORDER — SEMAGLUTIDE 1.34 MG/ML
INJECTION, SOLUTION SUBCUTANEOUS
Qty: 9 ML | Refills: 1 | Status: SHIPPED | OUTPATIENT
Start: 2021-03-03 | End: 2021-06-03

## 2021-03-05 ENCOUNTER — OFFICE VISIT (OUTPATIENT)
Dept: FAMILY MEDICINE | Facility: CLINIC | Age: 51
End: 2021-03-05
Payer: MEDICARE

## 2021-03-05 ENCOUNTER — LAB VISIT (OUTPATIENT)
Dept: LAB | Facility: HOSPITAL | Age: 51
End: 2021-03-05
Attending: FAMILY MEDICINE
Payer: MEDICARE

## 2021-03-05 VITALS
TEMPERATURE: 99 F | SYSTOLIC BLOOD PRESSURE: 132 MMHG | OXYGEN SATURATION: 99 % | BODY MASS INDEX: 58.65 KG/M2 | DIASTOLIC BLOOD PRESSURE: 70 MMHG | WEIGHT: 293 LBS | HEART RATE: 78 BPM | RESPIRATION RATE: 19 BRPM

## 2021-03-05 DIAGNOSIS — R60.1 GENERALIZED EDEMA: ICD-10-CM

## 2021-03-05 DIAGNOSIS — J30.2 SEASONAL ALLERGIES: ICD-10-CM

## 2021-03-05 DIAGNOSIS — R60.1 GENERALIZED EDEMA: Primary | ICD-10-CM

## 2021-03-05 DIAGNOSIS — E78.2 MIXED HYPERLIPIDEMIA: Chronic | ICD-10-CM

## 2021-03-05 DIAGNOSIS — J45.50 SEVERE PERSISTENT ASTHMA, UNSPECIFIED WHETHER COMPLICATED: ICD-10-CM

## 2021-03-05 DIAGNOSIS — D64.9 ACUTE ANEMIA: Primary | ICD-10-CM

## 2021-03-05 LAB
ALBUMIN SERPL BCP-MCNC: 3.2 G/DL (ref 3.5–5.2)
ALP SERPL-CCNC: 61 U/L (ref 55–135)
ALT SERPL W/O P-5'-P-CCNC: 9 U/L (ref 10–44)
ANION GAP SERPL CALC-SCNC: 7 MMOL/L (ref 8–16)
AST SERPL-CCNC: 10 U/L (ref 10–40)
BASOPHILS # BLD AUTO: 0.06 K/UL (ref 0–0.2)
BASOPHILS NFR BLD: 1.1 % (ref 0–1.9)
BILIRUB SERPL-MCNC: 0.3 MG/DL (ref 0.1–1)
BNP SERPL-MCNC: 19 PG/ML (ref 0–99)
BUN SERPL-MCNC: 14 MG/DL (ref 6–20)
CALCIUM SERPL-MCNC: 8.5 MG/DL (ref 8.7–10.5)
CHLORIDE SERPL-SCNC: 110 MMOL/L (ref 95–110)
CO2 SERPL-SCNC: 24 MMOL/L (ref 23–29)
CREAT SERPL-MCNC: 1.2 MG/DL (ref 0.5–1.4)
DIFFERENTIAL METHOD: ABNORMAL
EOSINOPHIL # BLD AUTO: 0.5 K/UL (ref 0–0.5)
EOSINOPHIL NFR BLD: 9.7 % (ref 0–8)
ERYTHROCYTE [DISTWIDTH] IN BLOOD BY AUTOMATED COUNT: 13.2 % (ref 11.5–14.5)
EST. GFR  (AFRICAN AMERICAN): >60 ML/MIN/1.73 M^2
EST. GFR  (NON AFRICAN AMERICAN): 54 ML/MIN/1.73 M^2
GLUCOSE SERPL-MCNC: 84 MG/DL (ref 70–110)
HCT VFR BLD AUTO: 36.6 % (ref 37–48.5)
HGB BLD-MCNC: 11.9 G/DL (ref 12–16)
IMM GRANULOCYTES # BLD AUTO: 0.02 K/UL (ref 0–0.04)
IMM GRANULOCYTES NFR BLD AUTO: 0.4 % (ref 0–0.5)
LYMPHOCYTES # BLD AUTO: 1.8 K/UL (ref 1–4.8)
LYMPHOCYTES NFR BLD: 32.8 % (ref 18–48)
MCH RBC QN AUTO: 31.6 PG (ref 27–31)
MCHC RBC AUTO-ENTMCNC: 32.5 G/DL (ref 32–36)
MCV RBC AUTO: 97 FL (ref 82–98)
MONOCYTES # BLD AUTO: 0.6 K/UL (ref 0.3–1)
MONOCYTES NFR BLD: 11.5 % (ref 4–15)
NEUTROPHILS # BLD AUTO: 2.4 K/UL (ref 1.8–7.7)
NEUTROPHILS NFR BLD: 44.5 % (ref 38–73)
NRBC BLD-RTO: 0 /100 WBC
PLATELET # BLD AUTO: 301 K/UL (ref 150–350)
PMV BLD AUTO: 9.4 FL (ref 9.2–12.9)
POTASSIUM SERPL-SCNC: 3.9 MMOL/L (ref 3.5–5.1)
PROT SERPL-MCNC: 6.7 G/DL (ref 6–8.4)
RBC # BLD AUTO: 3.77 M/UL (ref 4–5.4)
SODIUM SERPL-SCNC: 141 MMOL/L (ref 136–145)
TSH SERPL DL<=0.005 MIU/L-ACNC: 1.77 UIU/ML (ref 0.4–4)
WBC # BLD AUTO: 5.37 K/UL (ref 3.9–12.7)

## 2021-03-05 PROCEDURE — 3078F PR MOST RECENT DIASTOLIC BLOOD PRESSURE < 80 MM HG: ICD-10-PCS | Mod: CPTII,S$GLB,, | Performed by: FAMILY MEDICINE

## 2021-03-05 PROCEDURE — 3078F DIAST BP <80 MM HG: CPT | Mod: CPTII,S$GLB,, | Performed by: FAMILY MEDICINE

## 2021-03-05 PROCEDURE — 3075F SYST BP GE 130 - 139MM HG: CPT | Mod: CPTII,S$GLB,, | Performed by: FAMILY MEDICINE

## 2021-03-05 PROCEDURE — 3008F PR BODY MASS INDEX (BMI) DOCUMENTED: ICD-10-PCS | Mod: CPTII,S$GLB,, | Performed by: FAMILY MEDICINE

## 2021-03-05 PROCEDURE — 3008F BODY MASS INDEX DOCD: CPT | Mod: CPTII,S$GLB,, | Performed by: FAMILY MEDICINE

## 2021-03-05 PROCEDURE — 36415 COLL VENOUS BLD VENIPUNCTURE: CPT | Mod: PN | Performed by: FAMILY MEDICINE

## 2021-03-05 PROCEDURE — 99214 OFFICE O/P EST MOD 30 MIN: CPT | Mod: S$GLB,,, | Performed by: FAMILY MEDICINE

## 2021-03-05 PROCEDURE — 84443 ASSAY THYROID STIM HORMONE: CPT | Performed by: FAMILY MEDICINE

## 2021-03-05 PROCEDURE — 3075F PR MOST RECENT SYSTOLIC BLOOD PRESS GE 130-139MM HG: ICD-10-PCS | Mod: CPTII,S$GLB,, | Performed by: FAMILY MEDICINE

## 2021-03-05 PROCEDURE — 99999 PR PBB SHADOW E&M-EST. PATIENT-LVL III: ICD-10-PCS | Mod: PBBFAC,,, | Performed by: FAMILY MEDICINE

## 2021-03-05 PROCEDURE — 83880 ASSAY OF NATRIURETIC PEPTIDE: CPT | Performed by: FAMILY MEDICINE

## 2021-03-05 PROCEDURE — 99999 PR PBB SHADOW E&M-EST. PATIENT-LVL III: CPT | Mod: PBBFAC,,, | Performed by: FAMILY MEDICINE

## 2021-03-05 PROCEDURE — 80053 COMPREHEN METABOLIC PANEL: CPT | Performed by: FAMILY MEDICINE

## 2021-03-05 PROCEDURE — 85025 COMPLETE CBC W/AUTO DIFF WBC: CPT | Performed by: FAMILY MEDICINE

## 2021-03-05 PROCEDURE — 99214 PR OFFICE/OUTPT VISIT, EST, LEVL IV, 30-39 MIN: ICD-10-PCS | Mod: S$GLB,,, | Performed by: FAMILY MEDICINE

## 2021-03-05 RX ORDER — BUDESONIDE AND FORMOTEROL FUMARATE DIHYDRATE 160; 4.5 UG/1; UG/1
2 AEROSOL RESPIRATORY (INHALATION) EVERY 12 HOURS
Qty: 1 INHALER | Refills: 5 | Status: SHIPPED | OUTPATIENT
Start: 2021-03-05 | End: 2021-08-25 | Stop reason: SDUPTHER

## 2021-03-05 RX ORDER — MONTELUKAST SODIUM 10 MG/1
10 TABLET ORAL NIGHTLY
Qty: 30 TABLET | Refills: 3 | Status: SHIPPED | OUTPATIENT
Start: 2021-03-05 | End: 2021-04-04

## 2021-03-05 RX ORDER — ATORVASTATIN CALCIUM 40 MG/1
40 TABLET, FILM COATED ORAL DAILY
Qty: 90 TABLET | Refills: 3 | Status: SHIPPED | OUTPATIENT
Start: 2021-03-05 | End: 2021-10-19 | Stop reason: CLARIF

## 2021-03-10 ENCOUNTER — OFFICE VISIT (OUTPATIENT)
Dept: ORTHOPEDICS | Facility: CLINIC | Age: 51
End: 2021-03-10
Payer: MEDICARE

## 2021-03-10 DIAGNOSIS — M54.16 LUMBAR RADICULOPATHY: Primary | ICD-10-CM

## 2021-03-10 PROCEDURE — 1125F AMNT PAIN NOTED PAIN PRSNT: CPT | Mod: S$GLB,,, | Performed by: ORTHOPAEDIC SURGERY

## 2021-03-10 PROCEDURE — 99999 PR PBB SHADOW E&M-EST. PATIENT-LVL III: CPT | Mod: PBBFAC,,, | Performed by: ORTHOPAEDIC SURGERY

## 2021-03-10 PROCEDURE — 99214 OFFICE O/P EST MOD 30 MIN: CPT | Mod: S$GLB,,, | Performed by: ORTHOPAEDIC SURGERY

## 2021-03-10 PROCEDURE — 99999 PR PBB SHADOW E&M-EST. PATIENT-LVL III: ICD-10-PCS | Mod: PBBFAC,,, | Performed by: ORTHOPAEDIC SURGERY

## 2021-03-10 PROCEDURE — 1125F PR PAIN SEVERITY QUANTIFIED, PAIN PRESENT: ICD-10-PCS | Mod: S$GLB,,, | Performed by: ORTHOPAEDIC SURGERY

## 2021-03-10 PROCEDURE — 99214 PR OFFICE/OUTPT VISIT, EST, LEVL IV, 30-39 MIN: ICD-10-PCS | Mod: S$GLB,,, | Performed by: ORTHOPAEDIC SURGERY

## 2021-03-11 ENCOUNTER — PATIENT MESSAGE (OUTPATIENT)
Dept: PHYSICAL MEDICINE AND REHAB | Facility: CLINIC | Age: 51
End: 2021-03-11

## 2021-03-11 ENCOUNTER — PATIENT MESSAGE (OUTPATIENT)
Dept: FAMILY MEDICINE | Facility: CLINIC | Age: 51
End: 2021-03-11

## 2021-03-11 DIAGNOSIS — J45.40 MODERATE PERSISTENT ASTHMA WITHOUT COMPLICATION: Primary | ICD-10-CM

## 2021-03-12 ENCOUNTER — PATIENT MESSAGE (OUTPATIENT)
Dept: FAMILY MEDICINE | Facility: CLINIC | Age: 51
End: 2021-03-12

## 2021-03-12 PROBLEM — J45.40 MODERATE PERSISTENT ASTHMA WITHOUT COMPLICATION: Status: ACTIVE | Noted: 2021-03-12

## 2021-03-16 ENCOUNTER — TELEPHONE (OUTPATIENT)
Dept: BARIATRICS | Facility: CLINIC | Age: 51
End: 2021-03-16

## 2021-03-16 ENCOUNTER — PATIENT MESSAGE (OUTPATIENT)
Dept: FAMILY MEDICINE | Facility: CLINIC | Age: 51
End: 2021-03-16

## 2021-03-18 RX ORDER — DICYCLOMINE HYDROCHLORIDE 10 MG/1
10 CAPSULE ORAL 2 TIMES DAILY PRN
COMMUNITY
Start: 2021-03-15 | End: 2021-12-07

## 2021-03-19 ENCOUNTER — TELEPHONE (OUTPATIENT)
Dept: FAMILY MEDICINE | Facility: CLINIC | Age: 51
End: 2021-03-19

## 2021-03-19 ENCOUNTER — TELEPHONE (OUTPATIENT)
Dept: ENDOSCOPY | Facility: HOSPITAL | Age: 51
End: 2021-03-19

## 2021-03-19 ENCOUNTER — CLINICAL SUPPORT (OUTPATIENT)
Dept: AUDIOLOGY | Facility: CLINIC | Age: 51
End: 2021-03-19
Payer: MEDICARE

## 2021-03-19 ENCOUNTER — TELEPHONE (OUTPATIENT)
Dept: GASTROENTEROLOGY | Facility: CLINIC | Age: 51
End: 2021-03-19

## 2021-03-19 ENCOUNTER — OFFICE VISIT (OUTPATIENT)
Dept: GASTROENTEROLOGY | Facility: CLINIC | Age: 51
End: 2021-03-19
Payer: MEDICARE

## 2021-03-19 ENCOUNTER — OFFICE VISIT (OUTPATIENT)
Dept: OTOLARYNGOLOGY | Facility: CLINIC | Age: 51
End: 2021-03-19
Payer: MEDICARE

## 2021-03-19 VITALS — BODY MASS INDEX: 57.18 KG/M2 | WEIGHT: 293 LBS

## 2021-03-19 DIAGNOSIS — Z01.818 PRE-OP TESTING: ICD-10-CM

## 2021-03-19 DIAGNOSIS — K21.9 GASTROESOPHAGEAL REFLUX DISEASE WITHOUT ESOPHAGITIS: ICD-10-CM

## 2021-03-19 DIAGNOSIS — H83.8X9 SEMICIRCULAR CANAL DEHISCENCE SYNDROME: ICD-10-CM

## 2021-03-19 DIAGNOSIS — H93.293 ABNORMAL AUDITORY PERCEPTION OF BOTH EARS: Primary | ICD-10-CM

## 2021-03-19 DIAGNOSIS — H93.13 TINNITUS OF BOTH EARS: ICD-10-CM

## 2021-03-19 DIAGNOSIS — R11.2 NAUSEA AND VOMITING, INTRACTABILITY OF VOMITING NOT SPECIFIED, UNSPECIFIED VOMITING TYPE: ICD-10-CM

## 2021-03-19 DIAGNOSIS — R13.10 DYSPHAGIA, UNSPECIFIED TYPE: Primary | ICD-10-CM

## 2021-03-19 DIAGNOSIS — A04.8 H. PYLORI INFECTION: ICD-10-CM

## 2021-03-19 DIAGNOSIS — H93.19 TINNITUS, UNSPECIFIED LATERALITY: ICD-10-CM

## 2021-03-19 DIAGNOSIS — R10.9 ABDOMINAL PAIN, UNSPECIFIED ABDOMINAL LOCATION: ICD-10-CM

## 2021-03-19 DIAGNOSIS — R68.81 EARLY SATIETY: ICD-10-CM

## 2021-03-19 PROCEDURE — 92557 COMPREHENSIVE HEARING TEST: CPT | Mod: S$GLB,,, | Performed by: AUDIOLOGIST

## 2021-03-19 PROCEDURE — 99999 PR PBB SHADOW E&M-EST. PATIENT-LVL IV: CPT | Mod: PBBFAC,,, | Performed by: OTOLARYNGOLOGY

## 2021-03-19 PROCEDURE — 99215 OFFICE O/P EST HI 40 MIN: CPT | Mod: 95,,, | Performed by: INTERNAL MEDICINE

## 2021-03-19 PROCEDURE — 92567 TYMPANOMETRY: CPT | Mod: S$GLB,,, | Performed by: AUDIOLOGIST

## 2021-03-19 PROCEDURE — 1125F PR PAIN SEVERITY QUANTIFIED, PAIN PRESENT: ICD-10-PCS | Mod: S$GLB,,, | Performed by: OTOLARYNGOLOGY

## 2021-03-19 PROCEDURE — 99499 RISK ADDL DX/OHS AUDIT: ICD-10-PCS | Mod: 95,,, | Performed by: INTERNAL MEDICINE

## 2021-03-19 PROCEDURE — 99999 PR PBB SHADOW E&M-EST. PATIENT-LVL IV: ICD-10-PCS | Mod: PBBFAC,,, | Performed by: OTOLARYNGOLOGY

## 2021-03-19 PROCEDURE — 99215 PR OFFICE/OUTPT VISIT, EST, LEVL V, 40-54 MIN: ICD-10-PCS | Mod: 95,,, | Performed by: INTERNAL MEDICINE

## 2021-03-19 PROCEDURE — 3008F PR BODY MASS INDEX (BMI) DOCUMENTED: ICD-10-PCS | Mod: CPTII,S$GLB,, | Performed by: OTOLARYNGOLOGY

## 2021-03-19 PROCEDURE — 99214 OFFICE O/P EST MOD 30 MIN: CPT | Mod: S$GLB,,, | Performed by: OTOLARYNGOLOGY

## 2021-03-19 PROCEDURE — 99214 PR OFFICE/OUTPT VISIT, EST, LEVL IV, 30-39 MIN: ICD-10-PCS | Mod: S$GLB,,, | Performed by: OTOLARYNGOLOGY

## 2021-03-19 PROCEDURE — 99499 UNLISTED E&M SERVICE: CPT | Mod: 95,,, | Performed by: INTERNAL MEDICINE

## 2021-03-19 PROCEDURE — 92567 PR TYMPA2METRY: ICD-10-PCS | Mod: S$GLB,,, | Performed by: AUDIOLOGIST

## 2021-03-19 PROCEDURE — 1125F AMNT PAIN NOTED PAIN PRSNT: CPT | Mod: S$GLB,,, | Performed by: OTOLARYNGOLOGY

## 2021-03-19 PROCEDURE — 99999 PR PBB SHADOW E&M-EST. PATIENT-LVL II: ICD-10-PCS | Mod: PBBFAC,,, | Performed by: AUDIOLOGIST

## 2021-03-19 PROCEDURE — 92557 PR COMPREHENSIVE HEARING TEST: ICD-10-PCS | Mod: S$GLB,,, | Performed by: AUDIOLOGIST

## 2021-03-19 PROCEDURE — 99999 PR PBB SHADOW E&M-EST. PATIENT-LVL II: CPT | Mod: PBBFAC,,, | Performed by: AUDIOLOGIST

## 2021-03-19 PROCEDURE — 3008F BODY MASS INDEX DOCD: CPT | Mod: CPTII,S$GLB,, | Performed by: OTOLARYNGOLOGY

## 2021-03-20 ENCOUNTER — IMMUNIZATION (OUTPATIENT)
Dept: PRIMARY CARE CLINIC | Facility: CLINIC | Age: 51
End: 2021-03-20
Payer: MEDICARE

## 2021-03-20 DIAGNOSIS — Z23 NEED FOR VACCINATION: Primary | ICD-10-CM

## 2021-03-20 PROCEDURE — 0001A PR IMMUNIZ ADMIN, SARS-COV-2 COVID-19 VACC, 30MCG/0.3ML, 1ST DOSE: ICD-10-PCS | Mod: CV19,S$GLB,, | Performed by: INTERNAL MEDICINE

## 2021-03-20 PROCEDURE — 91300 PR SARS-COV- 2 COVID-19 VACCINE, NO PRSV, 30MCG/0.3ML, IM: CPT | Mod: S$GLB,,, | Performed by: INTERNAL MEDICINE

## 2021-03-20 PROCEDURE — 0001A PR IMMUNIZ ADMIN, SARS-COV-2 COVID-19 VACC, 30MCG/0.3ML, 1ST DOSE: CPT | Mod: CV19,S$GLB,, | Performed by: INTERNAL MEDICINE

## 2021-03-20 PROCEDURE — 91300 PR SARS-COV- 2 COVID-19 VACCINE, NO PRSV, 30MCG/0.3ML, IM: ICD-10-PCS | Mod: S$GLB,,, | Performed by: INTERNAL MEDICINE

## 2021-03-20 RX ADMIN — Medication 0.3 ML: at 02:03

## 2021-03-23 ENCOUNTER — DOCUMENTATION ONLY (OUTPATIENT)
Dept: BARIATRICS | Facility: CLINIC | Age: 51
End: 2021-03-23

## 2021-03-25 ENCOUNTER — HOSPITAL ENCOUNTER (OUTPATIENT)
Dept: RADIOLOGY | Facility: HOSPITAL | Age: 51
Discharge: HOME | End: 2021-03-25
Attending: OTOLARYNGOLOGY
Payer: MEDICARE

## 2021-03-25 ENCOUNTER — TELEPHONE (OUTPATIENT)
Dept: BARIATRICS | Facility: CLINIC | Age: 51
End: 2021-03-25

## 2021-03-25 DIAGNOSIS — H93.13 TINNITUS OF BOTH EARS: ICD-10-CM

## 2021-03-25 PROCEDURE — 70480 CT ORBIT/EAR/FOSSA W/O DYE: CPT | Mod: TC

## 2021-03-25 PROCEDURE — 70480 CT ORBIT/EAR/FOSSA W/O DYE: CPT | Mod: 26,,, | Performed by: RADIOLOGY

## 2021-03-25 PROCEDURE — 70480 CT TEMPORAL BONE WITHOUT CONTRAST: ICD-10-PCS | Mod: 26,,, | Performed by: RADIOLOGY

## 2021-03-26 ENCOUNTER — TELEPHONE (OUTPATIENT)
Dept: BARIATRICS | Facility: CLINIC | Age: 51
End: 2021-03-26

## 2021-03-29 DIAGNOSIS — J45.50 SEVERE PERSISTENT ASTHMA, UNSPECIFIED WHETHER COMPLICATED: ICD-10-CM

## 2021-03-29 RX ORDER — ALBUTEROL SULFATE 90 UG/1
2 AEROSOL, METERED RESPIRATORY (INHALATION) EVERY 6 HOURS PRN
Qty: 54 G | Refills: 0 | Status: SHIPPED | OUTPATIENT
Start: 2021-03-29 | End: 2021-08-25 | Stop reason: SDUPTHER

## 2021-04-06 ENCOUNTER — CLINICAL SUPPORT (OUTPATIENT)
Dept: GASTROENTEROLOGY | Facility: CLINIC | Age: 51
End: 2021-04-06
Payer: MEDICARE

## 2021-04-06 ENCOUNTER — TELEPHONE (OUTPATIENT)
Dept: BARIATRICS | Facility: CLINIC | Age: 51
End: 2021-04-06

## 2021-04-06 DIAGNOSIS — G47.33 OSA (OBSTRUCTIVE SLEEP APNEA): ICD-10-CM

## 2021-04-06 DIAGNOSIS — K31.84 GASTROPARESIS DUE TO DM: ICD-10-CM

## 2021-04-06 DIAGNOSIS — E66.01 MORBID OBESITY WITH BMI OF 50.0-59.9, ADULT: ICD-10-CM

## 2021-04-06 DIAGNOSIS — N18.31 TYPE 2 DIABETES MELLITUS WITH STAGE 3A CHRONIC KIDNEY DISEASE, WITH LONG-TERM CURRENT USE OF INSULIN: ICD-10-CM

## 2021-04-06 DIAGNOSIS — E11.22 TYPE 2 DIABETES MELLITUS WITH STAGE 3A CHRONIC KIDNEY DISEASE, WITH LONG-TERM CURRENT USE OF INSULIN: ICD-10-CM

## 2021-04-06 DIAGNOSIS — R13.10 DYSPHAGIA, UNSPECIFIED TYPE: ICD-10-CM

## 2021-04-06 DIAGNOSIS — K21.9 GASTROESOPHAGEAL REFLUX DISEASE WITHOUT ESOPHAGITIS: Primary | ICD-10-CM

## 2021-04-06 DIAGNOSIS — Z79.4 TYPE 2 DIABETES MELLITUS WITH STAGE 3A CHRONIC KIDNEY DISEASE, WITH LONG-TERM CURRENT USE OF INSULIN: ICD-10-CM

## 2021-04-06 DIAGNOSIS — E11.43 GASTROPARESIS DUE TO DM: ICD-10-CM

## 2021-04-06 PROCEDURE — 99499 NO LOS: ICD-10-PCS | Mod: 95,,, | Performed by: DIETITIAN, REGISTERED

## 2021-04-06 PROCEDURE — 99499 UNLISTED E&M SERVICE: CPT | Mod: 95,,, | Performed by: DIETITIAN, REGISTERED

## 2021-04-08 ENCOUNTER — LAB VISIT (OUTPATIENT)
Dept: LAB | Facility: HOSPITAL | Age: 51
End: 2021-04-08
Attending: INTERNAL MEDICINE
Payer: MEDICARE

## 2021-04-08 DIAGNOSIS — A04.8 H. PYLORI INFECTION: ICD-10-CM

## 2021-04-08 PROCEDURE — 87338 HPYLORI STOOL AG IA: CPT | Performed by: INTERNAL MEDICINE

## 2021-04-11 ENCOUNTER — IMMUNIZATION (OUTPATIENT)
Dept: PRIMARY CARE CLINIC | Facility: CLINIC | Age: 51
End: 2021-04-11
Payer: MEDICARE

## 2021-04-11 DIAGNOSIS — Z23 NEED FOR VACCINATION: Primary | ICD-10-CM

## 2021-04-11 PROCEDURE — 91300 PR SARS-COV- 2 COVID-19 VACCINE, NO PRSV, 30MCG/0.3ML, IM: CPT | Mod: S$GLB,,, | Performed by: INTERNAL MEDICINE

## 2021-04-11 PROCEDURE — 0002A PR IMMUNIZ ADMIN, SARS-COV-2 COVID-19 VACC, 30MCG/0.3ML, 2ND DOSE: CPT | Mod: CV19,S$GLB,, | Performed by: INTERNAL MEDICINE

## 2021-04-11 PROCEDURE — 0002A PR IMMUNIZ ADMIN, SARS-COV-2 COVID-19 VACC, 30MCG/0.3ML, 2ND DOSE: ICD-10-PCS | Mod: CV19,S$GLB,, | Performed by: INTERNAL MEDICINE

## 2021-04-11 PROCEDURE — 91300 PR SARS-COV- 2 COVID-19 VACCINE, NO PRSV, 30MCG/0.3ML, IM: ICD-10-PCS | Mod: S$GLB,,, | Performed by: INTERNAL MEDICINE

## 2021-04-11 RX ADMIN — Medication 0.3 ML: at 01:04

## 2021-04-12 ENCOUNTER — PES CALL (OUTPATIENT)
Dept: ADMINISTRATIVE | Facility: CLINIC | Age: 51
End: 2021-04-12

## 2021-04-12 ENCOUNTER — LAB VISIT (OUTPATIENT)
Dept: INTERNAL MEDICINE | Facility: CLINIC | Age: 51
End: 2021-04-12
Payer: MEDICARE

## 2021-04-12 DIAGNOSIS — Z01.818 PRE-OP TESTING: ICD-10-CM

## 2021-04-12 PROCEDURE — U0005 INFEC AGEN DETEC AMPLI PROBE: HCPCS | Performed by: FAMILY MEDICINE

## 2021-04-12 PROCEDURE — U0003 INFECTIOUS AGENT DETECTION BY NUCLEIC ACID (DNA OR RNA); SEVERE ACUTE RESPIRATORY SYNDROME CORONAVIRUS 2 (SARS-COV-2) (CORONAVIRUS DISEASE [COVID-19]), AMPLIFIED PROBE TECHNIQUE, MAKING USE OF HIGH THROUGHPUT TECHNOLOGIES AS DESCRIBED BY CMS-2020-01-R: HCPCS | Performed by: FAMILY MEDICINE

## 2021-04-13 ENCOUNTER — HOSPITAL ENCOUNTER (OUTPATIENT)
Dept: RADIOLOGY | Facility: HOSPITAL | Age: 51
Discharge: HOME | End: 2021-04-13
Attending: PHYSICIAN ASSISTANT
Payer: MEDICARE

## 2021-04-13 ENCOUNTER — CLINICAL SUPPORT (OUTPATIENT)
Dept: BARIATRICS | Facility: CLINIC | Age: 51
End: 2021-04-13
Payer: MEDICARE

## 2021-04-13 ENCOUNTER — HOSPITAL ENCOUNTER (OUTPATIENT)
Dept: CARDIOLOGY | Facility: CLINIC | Age: 51
Discharge: HOME | End: 2021-04-13
Payer: MEDICARE

## 2021-04-13 ENCOUNTER — OFFICE VISIT (OUTPATIENT)
Dept: BARIATRICS | Facility: CLINIC | Age: 51
End: 2021-04-13
Payer: MEDICARE

## 2021-04-13 VITALS — HEIGHT: 64 IN | WEIGHT: 293 LBS | BODY MASS INDEX: 50.02 KG/M2

## 2021-04-13 VITALS
RESPIRATION RATE: 16 BRPM | OXYGEN SATURATION: 97 % | SYSTOLIC BLOOD PRESSURE: 150 MMHG | DIASTOLIC BLOOD PRESSURE: 70 MMHG | WEIGHT: 293 LBS | HEIGHT: 64 IN | HEART RATE: 61 BPM | BODY MASS INDEX: 50.02 KG/M2

## 2021-04-13 DIAGNOSIS — I10 ESSENTIAL HYPERTENSION: Primary | Chronic | ICD-10-CM

## 2021-04-13 DIAGNOSIS — E78.2 MIXED HYPERLIPIDEMIA: ICD-10-CM

## 2021-04-13 DIAGNOSIS — Z79.4 TYPE 2 DIABETES MELLITUS WITH STAGE 3A CHRONIC KIDNEY DISEASE, WITH LONG-TERM CURRENT USE OF INSULIN: ICD-10-CM

## 2021-04-13 DIAGNOSIS — D53.9 NUTRITIONAL ANEMIA, UNSPECIFIED: ICD-10-CM

## 2021-04-13 DIAGNOSIS — K21.9 GASTROESOPHAGEAL REFLUX DISEASE WITHOUT ESOPHAGITIS: Chronic | ICD-10-CM

## 2021-04-13 DIAGNOSIS — I10 ESSENTIAL HYPERTENSION: ICD-10-CM

## 2021-04-13 DIAGNOSIS — N18.31 TYPE 2 DIABETES MELLITUS WITH STAGE 3A CHRONIC KIDNEY DISEASE, WITH LONG-TERM CURRENT USE OF INSULIN: ICD-10-CM

## 2021-04-13 DIAGNOSIS — E11.22 TYPE 2 DIABETES MELLITUS WITH STAGE 3A CHRONIC KIDNEY DISEASE, WITH LONG-TERM CURRENT USE OF INSULIN: ICD-10-CM

## 2021-04-13 DIAGNOSIS — Z72.0 TOBACCO ABUSE: ICD-10-CM

## 2021-04-13 DIAGNOSIS — E78.2 MIXED HYPERLIPIDEMIA: Chronic | ICD-10-CM

## 2021-04-13 DIAGNOSIS — E66.01 MORBID OBESITY WITH BMI OF 50.0-59.9, ADULT: ICD-10-CM

## 2021-04-13 DIAGNOSIS — D52.0 DIETARY FOLATE DEFICIENCY ANEMIA: ICD-10-CM

## 2021-04-13 DIAGNOSIS — I10 ESSENTIAL HYPERTENSION: Chronic | ICD-10-CM

## 2021-04-13 DIAGNOSIS — K21.9 GASTROESOPHAGEAL REFLUX DISEASE WITHOUT ESOPHAGITIS: ICD-10-CM

## 2021-04-13 LAB
H PYLORI AG STL QL IA: DETECTED
SARS-COV-2 RNA RESP QL NAA+PROBE: NOT DETECTED

## 2021-04-13 PROCEDURE — 71046 X-RAY EXAM CHEST 2 VIEWS: CPT | Mod: TC,FY

## 2021-04-13 PROCEDURE — 1125F AMNT PAIN NOTED PAIN PRSNT: CPT | Mod: S$GLB,,, | Performed by: PHYSICIAN ASSISTANT

## 2021-04-13 PROCEDURE — 1125F PR PAIN SEVERITY QUANTIFIED, PAIN PRESENT: ICD-10-PCS | Mod: S$GLB,,, | Performed by: PHYSICIAN ASSISTANT

## 2021-04-13 PROCEDURE — 3044F PR MOST RECENT HEMOGLOBIN A1C LEVEL <7.0%: ICD-10-PCS | Mod: CPTII,S$GLB,, | Performed by: PHYSICIAN ASSISTANT

## 2021-04-13 PROCEDURE — 99999 PR PBB SHADOW E&M-EST. PATIENT-LVL II: CPT | Mod: PBBFAC,,, | Performed by: DIETITIAN, REGISTERED

## 2021-04-13 PROCEDURE — 3078F DIAST BP <80 MM HG: CPT | Mod: CPTII,S$GLB,, | Performed by: PHYSICIAN ASSISTANT

## 2021-04-13 PROCEDURE — 93010 EKG 12-LEAD: ICD-10-PCS | Mod: S$GLB,,, | Performed by: INTERNAL MEDICINE

## 2021-04-13 PROCEDURE — 3008F PR BODY MASS INDEX (BMI) DOCUMENTED: ICD-10-PCS | Mod: CPTII,S$GLB,, | Performed by: PHYSICIAN ASSISTANT

## 2021-04-13 PROCEDURE — 99999 PR PBB SHADOW E&M-EST. PATIENT-LVL II: ICD-10-PCS | Mod: PBBFAC,,, | Performed by: DIETITIAN, REGISTERED

## 2021-04-13 PROCEDURE — 71046 X-RAY EXAM CHEST 2 VIEWS: CPT | Mod: 26,,, | Performed by: RADIOLOGY

## 2021-04-13 PROCEDURE — 3077F SYST BP >= 140 MM HG: CPT | Mod: CPTII,S$GLB,, | Performed by: PHYSICIAN ASSISTANT

## 2021-04-13 PROCEDURE — 93005 ELECTROCARDIOGRAM TRACING: CPT | Mod: S$GLB,,, | Performed by: PHYSICIAN ASSISTANT

## 2021-04-13 PROCEDURE — 3078F PR MOST RECENT DIASTOLIC BLOOD PRESSURE < 80 MM HG: ICD-10-PCS | Mod: CPTII,S$GLB,, | Performed by: PHYSICIAN ASSISTANT

## 2021-04-13 PROCEDURE — 99499 NO LOS: ICD-10-PCS | Mod: S$GLB,,, | Performed by: DIETITIAN, REGISTERED

## 2021-04-13 PROCEDURE — 99205 PR OFFICE/OUTPT VISIT, NEW, LEVL V, 60-74 MIN: ICD-10-PCS | Mod: S$GLB,,, | Performed by: PHYSICIAN ASSISTANT

## 2021-04-13 PROCEDURE — 3008F BODY MASS INDEX DOCD: CPT | Mod: CPTII,S$GLB,, | Performed by: PHYSICIAN ASSISTANT

## 2021-04-13 PROCEDURE — 93005 EKG 12-LEAD: ICD-10-PCS | Mod: S$GLB,,, | Performed by: PHYSICIAN ASSISTANT

## 2021-04-13 PROCEDURE — 71046 XR CHEST PA AND LATERAL: ICD-10-PCS | Mod: 26,,, | Performed by: RADIOLOGY

## 2021-04-13 PROCEDURE — 99999 PR PBB SHADOW E&M-EST. PATIENT-LVL IV: CPT | Mod: PBBFAC,,, | Performed by: PHYSICIAN ASSISTANT

## 2021-04-13 PROCEDURE — 93010 ELECTROCARDIOGRAM REPORT: CPT | Mod: S$GLB,,, | Performed by: INTERNAL MEDICINE

## 2021-04-13 PROCEDURE — 99205 OFFICE O/P NEW HI 60 MIN: CPT | Mod: S$GLB,,, | Performed by: PHYSICIAN ASSISTANT

## 2021-04-13 PROCEDURE — 99499 UNLISTED E&M SERVICE: CPT | Mod: S$GLB,,, | Performed by: DIETITIAN, REGISTERED

## 2021-04-13 PROCEDURE — 3077F PR MOST RECENT SYSTOLIC BLOOD PRESSURE >= 140 MM HG: ICD-10-PCS | Mod: CPTII,S$GLB,, | Performed by: PHYSICIAN ASSISTANT

## 2021-04-13 PROCEDURE — 3044F HG A1C LEVEL LT 7.0%: CPT | Mod: CPTII,S$GLB,, | Performed by: PHYSICIAN ASSISTANT

## 2021-04-13 PROCEDURE — 99999 PR PBB SHADOW E&M-EST. PATIENT-LVL IV: ICD-10-PCS | Mod: PBBFAC,,, | Performed by: PHYSICIAN ASSISTANT

## 2021-04-14 ENCOUNTER — LAB VISIT (OUTPATIENT)
Dept: LAB | Facility: HOSPITAL | Age: 51
End: 2021-04-14
Payer: MEDICAID

## 2021-04-14 ENCOUNTER — PATIENT OUTREACH (OUTPATIENT)
Dept: ADMINISTRATIVE | Facility: OTHER | Age: 51
End: 2021-04-14

## 2021-04-14 ENCOUNTER — OFFICE VISIT (OUTPATIENT)
Dept: PHYSICAL MEDICINE AND REHAB | Facility: CLINIC | Age: 51
End: 2021-04-14
Payer: MEDICARE

## 2021-04-14 VITALS
SYSTOLIC BLOOD PRESSURE: 145 MMHG | HEIGHT: 64 IN | DIASTOLIC BLOOD PRESSURE: 86 MMHG | WEIGHT: 293 LBS | BODY MASS INDEX: 50.02 KG/M2 | HEART RATE: 71 BPM

## 2021-04-14 DIAGNOSIS — I10 ESSENTIAL HYPERTENSION: Chronic | ICD-10-CM

## 2021-04-14 DIAGNOSIS — M54.42 CHRONIC MIDLINE LOW BACK PAIN WITH BILATERAL SCIATICA: ICD-10-CM

## 2021-04-14 DIAGNOSIS — E11.22 TYPE 2 DIABETES MELLITUS WITH STAGE 3A CHRONIC KIDNEY DISEASE, WITH LONG-TERM CURRENT USE OF INSULIN: ICD-10-CM

## 2021-04-14 DIAGNOSIS — M54.41 CHRONIC MIDLINE LOW BACK PAIN WITH BILATERAL SCIATICA: ICD-10-CM

## 2021-04-14 DIAGNOSIS — E66.01 MORBID OBESITY WITH BMI OF 50.0-59.9, ADULT: ICD-10-CM

## 2021-04-14 DIAGNOSIS — E78.2 MIXED HYPERLIPIDEMIA: Chronic | ICD-10-CM

## 2021-04-14 DIAGNOSIS — M79.7 FIBROMYALGIA SYNDROME: Primary | ICD-10-CM

## 2021-04-14 DIAGNOSIS — N18.31 TYPE 2 DIABETES MELLITUS WITH STAGE 3A CHRONIC KIDNEY DISEASE, WITH LONG-TERM CURRENT USE OF INSULIN: ICD-10-CM

## 2021-04-14 DIAGNOSIS — M48.061 SPINAL STENOSIS OF LUMBAR REGION, UNSPECIFIED WHETHER NEUROGENIC CLAUDICATION PRESENT: ICD-10-CM

## 2021-04-14 DIAGNOSIS — M48.061 NEURAL FORAMINAL STENOSIS OF LUMBAR SPINE: ICD-10-CM

## 2021-04-14 DIAGNOSIS — D53.9 NUTRITIONAL ANEMIA, UNSPECIFIED: ICD-10-CM

## 2021-04-14 DIAGNOSIS — Z72.0 TOBACCO ABUSE: ICD-10-CM

## 2021-04-14 DIAGNOSIS — D52.0 DIETARY FOLATE DEFICIENCY ANEMIA: ICD-10-CM

## 2021-04-14 DIAGNOSIS — Z79.4 TYPE 2 DIABETES MELLITUS WITH STAGE 3A CHRONIC KIDNEY DISEASE, WITH LONG-TERM CURRENT USE OF INSULIN: ICD-10-CM

## 2021-04-14 DIAGNOSIS — E55.9 VITAMIN D DEFICIENCY: Primary | ICD-10-CM

## 2021-04-14 DIAGNOSIS — G89.29 CHRONIC MIDLINE LOW BACK PAIN WITH BILATERAL SCIATICA: ICD-10-CM

## 2021-04-14 DIAGNOSIS — K21.9 GASTROESOPHAGEAL REFLUX DISEASE WITHOUT ESOPHAGITIS: Chronic | ICD-10-CM

## 2021-04-14 DIAGNOSIS — Z79.891 CHRONICALLY ON OPIATE THERAPY: ICD-10-CM

## 2021-04-14 DIAGNOSIS — M47.26 OSTEOARTHRITIS OF SPINE WITH RADICULOPATHY, LUMBAR REGION: ICD-10-CM

## 2021-04-14 LAB
25(OH)D3+25(OH)D2 SERPL-MCNC: 15 NG/ML (ref 30–96)
ALBUMIN SERPL BCP-MCNC: 3.2 G/DL (ref 3.5–5.2)
ALP SERPL-CCNC: 72 U/L (ref 55–135)
ALT SERPL W/O P-5'-P-CCNC: 13 U/L (ref 10–44)
ANION GAP SERPL CALC-SCNC: 9 MMOL/L (ref 8–16)
AST SERPL-CCNC: 14 U/L (ref 10–40)
BASOPHILS # BLD AUTO: 0.06 K/UL (ref 0–0.2)
BASOPHILS NFR BLD: 1.5 % (ref 0–1.9)
BILIRUB DIRECT SERPL-MCNC: 0.3 MG/DL (ref 0.1–0.3)
BILIRUB SERPL-MCNC: 0.7 MG/DL (ref 0.1–1)
BUN SERPL-MCNC: 10 MG/DL (ref 6–20)
CALCIUM SERPL-MCNC: 8.6 MG/DL (ref 8.7–10.5)
CHLORIDE SERPL-SCNC: 107 MMOL/L (ref 95–110)
CHOLEST SERPL-MCNC: 155 MG/DL (ref 120–199)
CHOLEST/HDLC SERPL: 2.6 {RATIO} (ref 2–5)
CO2 SERPL-SCNC: 26 MMOL/L (ref 23–29)
CREAT SERPL-MCNC: 1.1 MG/DL (ref 0.5–1.4)
DIFFERENTIAL METHOD: ABNORMAL
EOSINOPHIL # BLD AUTO: 0.5 K/UL (ref 0–0.5)
EOSINOPHIL NFR BLD: 12.5 % (ref 0–8)
ERYTHROCYTE [DISTWIDTH] IN BLOOD BY AUTOMATED COUNT: 13 % (ref 11.5–14.5)
EST. GFR  (AFRICAN AMERICAN): >60 ML/MIN/1.73 M^2
EST. GFR  (NON AFRICAN AMERICAN): 59.5 ML/MIN/1.73 M^2
ESTIMATED AVG GLUCOSE: 111 MG/DL (ref 68–131)
FOLATE SERPL-MCNC: 5.7 NG/ML (ref 4–24)
GLUCOSE SERPL-MCNC: 94 MG/DL (ref 70–110)
HBA1C MFR BLD: 5.5 % (ref 4–5.6)
HCT VFR BLD AUTO: 39.8 % (ref 37–48.5)
HDLC SERPL-MCNC: 59 MG/DL (ref 40–75)
HDLC SERPL: 38.1 % (ref 20–50)
HGB BLD-MCNC: 12.8 G/DL (ref 12–16)
IMM GRANULOCYTES # BLD AUTO: 0.01 K/UL (ref 0–0.04)
IMM GRANULOCYTES NFR BLD AUTO: 0.3 % (ref 0–0.5)
IRON SERPL-MCNC: 49 UG/DL (ref 30–160)
LDLC SERPL CALC-MCNC: 80.6 MG/DL (ref 63–159)
LYMPHOCYTES # BLD AUTO: 1.5 K/UL (ref 1–4.8)
LYMPHOCYTES NFR BLD: 38.8 % (ref 18–48)
MAGNESIUM SERPL-MCNC: 2 MG/DL (ref 1.6–2.6)
MCH RBC QN AUTO: 30.6 PG (ref 27–31)
MCHC RBC AUTO-ENTMCNC: 32.2 G/DL (ref 32–36)
MCV RBC AUTO: 95 FL (ref 82–98)
MONOCYTES # BLD AUTO: 0.4 K/UL (ref 0.3–1)
MONOCYTES NFR BLD: 10.7 % (ref 4–15)
NEUTROPHILS # BLD AUTO: 1.4 K/UL (ref 1.8–7.7)
NEUTROPHILS NFR BLD: 36.2 % (ref 38–73)
NONHDLC SERPL-MCNC: 96 MG/DL
NRBC BLD-RTO: 0 /100 WBC
PHOSPHATE SERPL-MCNC: 4.1 MG/DL (ref 2.7–4.5)
PLATELET # BLD AUTO: 251 K/UL (ref 150–450)
PMV BLD AUTO: 9 FL (ref 9.2–12.9)
POTASSIUM SERPL-SCNC: 3.7 MMOL/L (ref 3.5–5.1)
PROT SERPL-MCNC: 6.8 G/DL (ref 6–8.4)
RBC # BLD AUTO: 4.18 M/UL (ref 4–5.4)
SATURATED IRON: 14 % (ref 20–50)
SODIUM SERPL-SCNC: 142 MMOL/L (ref 136–145)
T3 SERPL-MCNC: 91 NG/DL (ref 60–180)
T4 FREE SERPL-MCNC: 0.92 NG/DL (ref 0.71–1.51)
T4 SERPL-MCNC: 5.6 UG/DL (ref 4.5–11.5)
TOTAL IRON BINDING CAPACITY: 343 UG/DL (ref 250–450)
TRANSFERRIN SERPL-MCNC: 232 MG/DL (ref 200–375)
TRIGL SERPL-MCNC: 77 MG/DL (ref 30–150)
TSH SERPL DL<=0.005 MIU/L-ACNC: 1.65 UIU/ML (ref 0.4–4)
VIT B12 SERPL-MCNC: 439 PG/ML (ref 210–950)
WBC # BLD AUTO: 3.92 K/UL (ref 3.9–12.7)

## 2021-04-14 PROCEDURE — 84443 ASSAY THYROID STIM HORMONE: CPT | Performed by: PHYSICIAN ASSISTANT

## 2021-04-14 PROCEDURE — 80076 HEPATIC FUNCTION PANEL: CPT | Performed by: PHYSICIAN ASSISTANT

## 2021-04-14 PROCEDURE — 86677 HELICOBACTER PYLORI ANTIBODY: CPT | Performed by: PHYSICIAN ASSISTANT

## 2021-04-14 PROCEDURE — 82607 VITAMIN B-12: CPT | Performed by: PHYSICIAN ASSISTANT

## 2021-04-14 PROCEDURE — 1125F AMNT PAIN NOTED PAIN PRSNT: CPT | Mod: S$GLB,,, | Performed by: PHYSICAL MEDICINE & REHABILITATION

## 2021-04-14 PROCEDURE — 83036 HEMOGLOBIN GLYCOSYLATED A1C: CPT | Performed by: PHYSICIAN ASSISTANT

## 2021-04-14 PROCEDURE — 1125F PR PAIN SEVERITY QUANTIFIED, PAIN PRESENT: ICD-10-PCS | Mod: S$GLB,,, | Performed by: PHYSICAL MEDICINE & REHABILITATION

## 2021-04-14 PROCEDURE — 82746 ASSAY OF FOLIC ACID SERUM: CPT | Performed by: PHYSICIAN ASSISTANT

## 2021-04-14 PROCEDURE — 80048 BASIC METABOLIC PNL TOTAL CA: CPT | Performed by: PHYSICIAN ASSISTANT

## 2021-04-14 PROCEDURE — 3008F PR BODY MASS INDEX (BMI) DOCUMENTED: ICD-10-PCS | Mod: CPTII,S$GLB,, | Performed by: PHYSICAL MEDICINE & REHABILITATION

## 2021-04-14 PROCEDURE — 85025 COMPLETE CBC W/AUTO DIFF WBC: CPT | Performed by: PHYSICIAN ASSISTANT

## 2021-04-14 PROCEDURE — 99999 PR PBB SHADOW E&M-EST. PATIENT-LVL III: CPT | Mod: PBBFAC,,, | Performed by: PHYSICAL MEDICINE & REHABILITATION

## 2021-04-14 PROCEDURE — 84100 ASSAY OF PHOSPHORUS: CPT | Performed by: PHYSICIAN ASSISTANT

## 2021-04-14 PROCEDURE — 83540 ASSAY OF IRON: CPT | Performed by: PHYSICIAN ASSISTANT

## 2021-04-14 PROCEDURE — 84425 ASSAY OF VITAMIN B-1: CPT | Performed by: PHYSICIAN ASSISTANT

## 2021-04-14 PROCEDURE — 36415 COLL VENOUS BLD VENIPUNCTURE: CPT | Performed by: PHYSICIAN ASSISTANT

## 2021-04-14 PROCEDURE — 99999 PR PBB SHADOW E&M-EST. PATIENT-LVL III: ICD-10-PCS | Mod: PBBFAC,,, | Performed by: PHYSICAL MEDICINE & REHABILITATION

## 2021-04-14 PROCEDURE — 99214 PR OFFICE/OUTPT VISIT, EST, LEVL IV, 30-39 MIN: ICD-10-PCS | Mod: S$GLB,,, | Performed by: PHYSICAL MEDICINE & REHABILITATION

## 2021-04-14 PROCEDURE — 99214 OFFICE O/P EST MOD 30 MIN: CPT | Mod: S$GLB,,, | Performed by: PHYSICAL MEDICINE & REHABILITATION

## 2021-04-14 PROCEDURE — 84439 ASSAY OF FREE THYROXINE: CPT | Performed by: PHYSICIAN ASSISTANT

## 2021-04-14 PROCEDURE — 84480 ASSAY TRIIODOTHYRONINE (T3): CPT | Performed by: PHYSICIAN ASSISTANT

## 2021-04-14 PROCEDURE — 83735 ASSAY OF MAGNESIUM: CPT | Performed by: PHYSICIAN ASSISTANT

## 2021-04-14 PROCEDURE — 3008F BODY MASS INDEX DOCD: CPT | Mod: CPTII,S$GLB,, | Performed by: PHYSICAL MEDICINE & REHABILITATION

## 2021-04-14 PROCEDURE — 84436 ASSAY OF TOTAL THYROXINE: CPT | Performed by: PHYSICIAN ASSISTANT

## 2021-04-14 PROCEDURE — 82306 VITAMIN D 25 HYDROXY: CPT | Performed by: PHYSICIAN ASSISTANT

## 2021-04-14 PROCEDURE — 80061 LIPID PANEL: CPT | Performed by: PHYSICIAN ASSISTANT

## 2021-04-14 PROCEDURE — 80323 ALKALOIDS NOS: CPT | Performed by: PHYSICIAN ASSISTANT

## 2021-04-14 RX ORDER — CYCLOBENZAPRINE HCL 10 MG
10 TABLET ORAL 3 TIMES DAILY PRN
Qty: 90 TABLET | Refills: 2 | Status: SHIPPED | OUTPATIENT
Start: 2021-04-14 | End: 2021-05-14

## 2021-04-14 RX ORDER — ERGOCALCIFEROL 1.25 MG/1
50000 CAPSULE ORAL
Qty: 24 CAPSULE | Refills: 0 | Status: SHIPPED | OUTPATIENT
Start: 2021-04-15 | End: 2021-10-19 | Stop reason: CLARIF

## 2021-04-14 RX ORDER — OXYCODONE AND ACETAMINOPHEN 7.5; 325 MG/1; MG/1
1 TABLET ORAL 3 TIMES DAILY PRN
Qty: 90 TABLET | Refills: 0 | Status: SHIPPED | OUTPATIENT
Start: 2021-04-14 | End: 2021-05-11 | Stop reason: SDUPTHER

## 2021-04-15 ENCOUNTER — TELEPHONE (OUTPATIENT)
Dept: BARIATRICS | Facility: CLINIC | Age: 51
End: 2021-04-15

## 2021-04-15 ENCOUNTER — ANESTHESIA (OUTPATIENT)
Dept: ENDOSCOPY | Facility: HOSPITAL | Age: 51
End: 2021-04-15
Payer: MEDICARE

## 2021-04-15 ENCOUNTER — ANESTHESIA EVENT (OUTPATIENT)
Dept: ENDOSCOPY | Facility: HOSPITAL | Age: 51
End: 2021-04-15
Payer: MEDICARE

## 2021-04-15 ENCOUNTER — TELEPHONE (OUTPATIENT)
Dept: GASTROENTEROLOGY | Facility: CLINIC | Age: 51
End: 2021-04-15

## 2021-04-15 ENCOUNTER — HOSPITAL ENCOUNTER (OUTPATIENT)
Facility: HOSPITAL | Age: 51
Discharge: HOME | End: 2021-04-15
Attending: INTERNAL MEDICINE | Admitting: INTERNAL MEDICINE
Payer: MEDICARE

## 2021-04-15 VITALS
BODY MASS INDEX: 50.02 KG/M2 | HEART RATE: 60 BPM | WEIGHT: 293 LBS | TEMPERATURE: 98 F | DIASTOLIC BLOOD PRESSURE: 95 MMHG | HEIGHT: 64 IN | SYSTOLIC BLOOD PRESSURE: 161 MMHG | OXYGEN SATURATION: 94 % | RESPIRATION RATE: 16 BRPM

## 2021-04-15 DIAGNOSIS — R76.8 HELICOBACTER PYLORI AB+: Primary | ICD-10-CM

## 2021-04-15 DIAGNOSIS — R13.10 DYSPHAGIA: ICD-10-CM

## 2021-04-15 DIAGNOSIS — A04.8 H. PYLORI INFECTION: Primary | ICD-10-CM

## 2021-04-15 DIAGNOSIS — K21.9 GASTROESOPHAGEAL REFLUX DISEASE WITHOUT ESOPHAGITIS: ICD-10-CM

## 2021-04-15 DIAGNOSIS — R13.10 DYSPHAGIA, UNSPECIFIED TYPE: Primary | ICD-10-CM

## 2021-04-15 LAB
COTININE SERPL-MCNC: 320 NG/ML
H PYLORI IGG SERPL QL IA: POSITIVE
NICOTINE SERPL-MCNC: 16 NG/ML
POCT GLUCOSE: 94 MG/DL (ref 70–110)
POCT GLUCOSE: 96 MG/DL (ref 70–110)

## 2021-04-15 PROCEDURE — 43239 EGD BIOPSY SINGLE/MULTIPLE: CPT | Mod: 51,,, | Performed by: INTERNAL MEDICINE

## 2021-04-15 PROCEDURE — C1726 CATH, BAL DIL, NON-VASCULAR: HCPCS | Performed by: INTERNAL MEDICINE

## 2021-04-15 PROCEDURE — 25000003 PHARM REV CODE 250: Performed by: INTERNAL MEDICINE

## 2021-04-15 PROCEDURE — 63600175 PHARM REV CODE 636 W HCPCS: Performed by: NURSE ANESTHETIST, CERTIFIED REGISTERED

## 2021-04-15 PROCEDURE — 27201012 HC FORCEPS, HOT/COLD, DISP: Performed by: INTERNAL MEDICINE

## 2021-04-15 PROCEDURE — 88342 CHG IMMUNOCYTOCHEMISTRY: ICD-10-PCS | Mod: 26,,, | Performed by: PATHOLOGY

## 2021-04-15 PROCEDURE — 37000009 HC ANESTHESIA EA ADD 15 MINS: Performed by: INTERNAL MEDICINE

## 2021-04-15 PROCEDURE — D9220A PRA ANESTHESIA: Mod: ANES,,, | Performed by: ANESTHESIOLOGY

## 2021-04-15 PROCEDURE — 91040 PR ESOPH BALLOON DISTENSION TST: ICD-10-PCS | Mod: 26,,, | Performed by: INTERNAL MEDICINE

## 2021-04-15 PROCEDURE — 88305 TISSUE EXAM BY PATHOLOGIST: CPT | Mod: 59 | Performed by: PATHOLOGY

## 2021-04-15 PROCEDURE — D9220A PRA ANESTHESIA: ICD-10-PCS | Mod: CRNA,,, | Performed by: NURSE ANESTHETIST, CERTIFIED REGISTERED

## 2021-04-15 PROCEDURE — 43239 PR EGD, FLEX, W/BIOPSY, SGL/MULTI: ICD-10-PCS | Mod: 51,,, | Performed by: INTERNAL MEDICINE

## 2021-04-15 PROCEDURE — 88342 IMHCHEM/IMCYTCHM 1ST ANTB: CPT | Mod: 26,,, | Performed by: PATHOLOGY

## 2021-04-15 PROCEDURE — 43239 EGD BIOPSY SINGLE/MULTIPLE: CPT | Performed by: INTERNAL MEDICINE

## 2021-04-15 PROCEDURE — 25000003 PHARM REV CODE 250: Performed by: NURSE ANESTHETIST, CERTIFIED REGISTERED

## 2021-04-15 PROCEDURE — D9220A PRA ANESTHESIA: ICD-10-PCS | Mod: ANES,,, | Performed by: ANESTHESIOLOGY

## 2021-04-15 PROCEDURE — 82962 GLUCOSE BLOOD TEST: CPT | Performed by: INTERNAL MEDICINE

## 2021-04-15 PROCEDURE — 91040 ESOPH BALLOON DISTENSION TST: CPT | Mod: 26,,, | Performed by: INTERNAL MEDICINE

## 2021-04-15 PROCEDURE — 37000008 HC ANESTHESIA 1ST 15 MINUTES: Performed by: INTERNAL MEDICINE

## 2021-04-15 PROCEDURE — 88342 IMHCHEM/IMCYTCHM 1ST ANTB: CPT | Performed by: PATHOLOGY

## 2021-04-15 PROCEDURE — D9220A PRA ANESTHESIA: Mod: CRNA,,, | Performed by: NURSE ANESTHETIST, CERTIFIED REGISTERED

## 2021-04-15 PROCEDURE — 88305 TISSUE EXAM BY PATHOLOGIST: ICD-10-PCS | Mod: 26,,, | Performed by: PATHOLOGY

## 2021-04-15 PROCEDURE — 88305 TISSUE EXAM BY PATHOLOGIST: CPT | Mod: 26,,, | Performed by: PATHOLOGY

## 2021-04-15 PROCEDURE — 91040 ESOPH BALLOON DISTENSION TST: CPT | Performed by: INTERNAL MEDICINE

## 2021-04-15 RX ORDER — SODIUM CHLORIDE 9 MG/ML
INJECTION, SOLUTION INTRAVENOUS CONTINUOUS
Status: DISCONTINUED | OUTPATIENT
Start: 2021-04-15 | End: 2021-04-15 | Stop reason: HOSPADM

## 2021-04-15 RX ORDER — AMOXICILLIN 500 MG/1
1000 TABLET, FILM COATED ORAL EVERY 12 HOURS
Qty: 56 TABLET | Refills: 0 | Status: SHIPPED | OUTPATIENT
Start: 2021-04-15 | End: 2021-04-29

## 2021-04-15 RX ORDER — OMEPRAZOLE 20 MG/1
20 CAPSULE, DELAYED RELEASE ORAL 2 TIMES DAILY
Qty: 28 CAPSULE | Refills: 0 | Status: SHIPPED | OUTPATIENT
Start: 2021-04-15 | End: 2021-06-08

## 2021-04-15 RX ORDER — PROPOFOL 10 MG/ML
VIAL (ML) INTRAVENOUS
Status: DISCONTINUED | OUTPATIENT
Start: 2021-04-15 | End: 2021-04-15

## 2021-04-15 RX ORDER — LIDOCAINE HYDROCHLORIDE 20 MG/ML
INJECTION INTRAVENOUS
Status: DISCONTINUED | OUTPATIENT
Start: 2021-04-15 | End: 2021-04-15

## 2021-04-15 RX ORDER — SODIUM CHLORIDE 0.9 % (FLUSH) 0.9 %
10 SYRINGE (ML) INJECTION
Status: DISCONTINUED | OUTPATIENT
Start: 2021-04-15 | End: 2021-04-15 | Stop reason: HOSPADM

## 2021-04-15 RX ORDER — PROPOFOL 10 MG/ML
VIAL (ML) INTRAVENOUS CONTINUOUS PRN
Status: DISCONTINUED | OUTPATIENT
Start: 2021-04-15 | End: 2021-04-15

## 2021-04-15 RX ORDER — CLARITHROMYCIN 500 MG/1
500 TABLET, FILM COATED ORAL EVERY 12 HOURS
Qty: 28 TABLET | Refills: 0 | Status: SHIPPED | OUTPATIENT
Start: 2021-04-15 | End: 2021-04-29

## 2021-04-15 RX ADMIN — LIDOCAINE HYDROCHLORIDE 100 MG: 20 INJECTION, SOLUTION INTRAVENOUS at 09:04

## 2021-04-15 RX ADMIN — PROPOFOL 100 MG: 10 INJECTION, EMULSION INTRAVENOUS at 09:04

## 2021-04-15 RX ADMIN — PROPOFOL 50 MG: 10 INJECTION, EMULSION INTRAVENOUS at 09:04

## 2021-04-15 RX ADMIN — PROPOFOL 150 MCG/KG/MIN: 10 INJECTION, EMULSION INTRAVENOUS at 09:04

## 2021-04-15 RX ADMIN — SODIUM CHLORIDE: 0.9 INJECTION, SOLUTION INTRAVENOUS at 08:04

## 2021-04-15 RX ADMIN — SODIUM CHLORIDE: 0.9 INJECTION, SOLUTION INTRAVENOUS at 09:04

## 2021-04-16 ENCOUNTER — TELEPHONE (OUTPATIENT)
Dept: BARIATRICS | Facility: CLINIC | Age: 51
End: 2021-04-16

## 2021-04-16 DIAGNOSIS — Z72.0 TOBACCO ABUSE: Primary | ICD-10-CM

## 2021-04-19 LAB — VIT B1 BLD-MCNC: 45 UG/L (ref 38–122)

## 2021-04-20 ENCOUNTER — OFFICE VISIT (OUTPATIENT)
Dept: INFECTIOUS DISEASES | Facility: CLINIC | Age: 51
End: 2021-04-20
Payer: MEDICARE

## 2021-04-20 DIAGNOSIS — A04.8 H. PYLORI INFECTION: ICD-10-CM

## 2021-04-20 PROCEDURE — 99204 PR OFFICE/OUTPT VISIT, NEW, LEVL IV, 45-59 MIN: ICD-10-PCS | Mod: 95,,, | Performed by: INTERNAL MEDICINE

## 2021-04-20 PROCEDURE — 99204 OFFICE O/P NEW MOD 45 MIN: CPT | Mod: 95,,, | Performed by: INTERNAL MEDICINE

## 2021-04-22 ENCOUNTER — PATIENT MESSAGE (OUTPATIENT)
Dept: UROLOGY | Facility: CLINIC | Age: 51
End: 2021-04-22

## 2021-04-22 LAB
FINAL PATHOLOGIC DIAGNOSIS: NORMAL
GROSS: NORMAL
Lab: NORMAL
MICROSCOPIC EXAM: NORMAL

## 2021-04-23 ENCOUNTER — PATIENT MESSAGE (OUTPATIENT)
Dept: GASTROENTEROLOGY | Facility: CLINIC | Age: 51
End: 2021-04-23

## 2021-04-26 ENCOUNTER — TELEPHONE (OUTPATIENT)
Dept: GASTROENTEROLOGY | Facility: CLINIC | Age: 51
End: 2021-04-26

## 2021-04-26 ENCOUNTER — TELEPHONE (OUTPATIENT)
Dept: BARIATRICS | Facility: CLINIC | Age: 51
End: 2021-04-26
Payer: MEDICARE

## 2021-04-26 ENCOUNTER — TELEPHONE (OUTPATIENT)
Dept: BARIATRICS | Facility: CLINIC | Age: 51
End: 2021-04-26

## 2021-04-28 ENCOUNTER — OFFICE VISIT (OUTPATIENT)
Dept: FAMILY MEDICINE | Facility: CLINIC | Age: 51
End: 2021-04-28
Payer: MEDICARE

## 2021-04-28 VITALS
SYSTOLIC BLOOD PRESSURE: 146 MMHG | HEIGHT: 64 IN | HEART RATE: 96 BPM | TEMPERATURE: 98 F | WEIGHT: 293 LBS | DIASTOLIC BLOOD PRESSURE: 84 MMHG | RESPIRATION RATE: 18 BRPM | BODY MASS INDEX: 50.02 KG/M2 | OXYGEN SATURATION: 97 %

## 2021-04-28 DIAGNOSIS — R09.82 POST-NASAL DRIP: ICD-10-CM

## 2021-04-28 DIAGNOSIS — J20.9 ACUTE BRONCHITIS, UNSPECIFIED ORGANISM: Primary | ICD-10-CM

## 2021-04-28 PROCEDURE — 3008F PR BODY MASS INDEX (BMI) DOCUMENTED: ICD-10-PCS | Mod: CPTII,S$GLB,, | Performed by: NURSE PRACTITIONER

## 2021-04-28 PROCEDURE — 1126F PR PAIN SEVERITY QUANTIFIED, NO PAIN PRESENT: ICD-10-PCS | Mod: S$GLB,,, | Performed by: NURSE PRACTITIONER

## 2021-04-28 PROCEDURE — 3008F BODY MASS INDEX DOCD: CPT | Mod: CPTII,S$GLB,, | Performed by: NURSE PRACTITIONER

## 2021-04-28 PROCEDURE — 1126F AMNT PAIN NOTED NONE PRSNT: CPT | Mod: S$GLB,,, | Performed by: NURSE PRACTITIONER

## 2021-04-28 PROCEDURE — 99214 OFFICE O/P EST MOD 30 MIN: CPT | Mod: S$GLB,,, | Performed by: NURSE PRACTITIONER

## 2021-04-28 PROCEDURE — 99214 PR OFFICE/OUTPT VISIT, EST, LEVL IV, 30-39 MIN: ICD-10-PCS | Mod: S$GLB,,, | Performed by: NURSE PRACTITIONER

## 2021-04-28 PROCEDURE — 99999 PR PBB SHADOW E&M-EST. PATIENT-LVL III: CPT | Mod: PBBFAC,,, | Performed by: NURSE PRACTITIONER

## 2021-04-28 PROCEDURE — 99999 PR PBB SHADOW E&M-EST. PATIENT-LVL III: ICD-10-PCS | Mod: PBBFAC,,, | Performed by: NURSE PRACTITIONER

## 2021-04-28 RX ORDER — BENZONATATE 200 MG/1
200 CAPSULE ORAL 3 TIMES DAILY PRN
Qty: 30 CAPSULE | Refills: 0 | Status: SHIPPED | OUTPATIENT
Start: 2021-04-28 | End: 2021-05-08

## 2021-04-28 RX ORDER — FLUTICASONE PROPIONATE 50 MCG
1 SPRAY, SUSPENSION (ML) NASAL DAILY
Qty: 15.8 ML | Refills: 0 | Status: SHIPPED | OUTPATIENT
Start: 2021-04-28 | End: 2021-06-07 | Stop reason: SDUPTHER

## 2021-04-28 RX ORDER — PREDNISONE 20 MG/1
60 TABLET ORAL DAILY
Qty: 15 TABLET | Refills: 0 | Status: SHIPPED | OUTPATIENT
Start: 2021-04-28 | End: 2021-05-03

## 2021-05-19 ENCOUNTER — TELEPHONE (OUTPATIENT)
Dept: ADMINISTRATIVE | Facility: CLINIC | Age: 51
End: 2021-05-19

## 2021-06-01 ENCOUNTER — PATIENT OUTREACH (OUTPATIENT)
Dept: ADMINISTRATIVE | Facility: OTHER | Age: 51
End: 2021-06-01

## 2021-06-03 ENCOUNTER — OFFICE VISIT (OUTPATIENT)
Dept: BARIATRICS | Facility: CLINIC | Age: 51
End: 2021-06-03
Payer: MEDICARE

## 2021-06-03 ENCOUNTER — OFFICE VISIT (OUTPATIENT)
Dept: OBSTETRICS AND GYNECOLOGY | Facility: CLINIC | Age: 51
End: 2021-06-03
Payer: MEDICARE

## 2021-06-03 VITALS
HEIGHT: 64 IN | BODY MASS INDEX: 50.02 KG/M2 | WEIGHT: 293 LBS | SYSTOLIC BLOOD PRESSURE: 132 MMHG | DIASTOLIC BLOOD PRESSURE: 80 MMHG

## 2021-06-03 VITALS
WEIGHT: 293 LBS | DIASTOLIC BLOOD PRESSURE: 62 MMHG | HEART RATE: 86 BPM | BODY MASS INDEX: 56.91 KG/M2 | OXYGEN SATURATION: 98 % | SYSTOLIC BLOOD PRESSURE: 116 MMHG

## 2021-06-03 DIAGNOSIS — E66.01 CLASS 3 SEVERE OBESITY DUE TO EXCESS CALORIES WITH SERIOUS COMORBIDITY AND BODY MASS INDEX (BMI) OF 50.0 TO 59.9 IN ADULT: ICD-10-CM

## 2021-06-03 DIAGNOSIS — E11.22 TYPE 2 DIABETES MELLITUS WITH STAGE 3A CHRONIC KIDNEY DISEASE, WITH LONG-TERM CURRENT USE OF INSULIN: Primary | ICD-10-CM

## 2021-06-03 DIAGNOSIS — R10.2 PELVIC PAIN IN FEMALE: Primary | ICD-10-CM

## 2021-06-03 DIAGNOSIS — R14.0 ABDOMINAL DISTENSION (GASEOUS): ICD-10-CM

## 2021-06-03 DIAGNOSIS — N18.31 TYPE 2 DIABETES MELLITUS WITH STAGE 3A CHRONIC KIDNEY DISEASE, WITH LONG-TERM CURRENT USE OF INSULIN: Primary | ICD-10-CM

## 2021-06-03 DIAGNOSIS — Z79.4 TYPE 2 DIABETES MELLITUS WITH STAGE 3A CHRONIC KIDNEY DISEASE, WITH LONG-TERM CURRENT USE OF INSULIN: Primary | ICD-10-CM

## 2021-06-03 PROCEDURE — 1126F AMNT PAIN NOTED NONE PRSNT: CPT | Mod: S$GLB,,, | Performed by: OBSTETRICS & GYNECOLOGY

## 2021-06-03 PROCEDURE — 3044F PR MOST RECENT HEMOGLOBIN A1C LEVEL <7.0%: ICD-10-PCS | Mod: CPTII,S$GLB,, | Performed by: INTERNAL MEDICINE

## 2021-06-03 PROCEDURE — 99213 PR OFFICE/OUTPT VISIT, EST, LEVL III, 20-29 MIN: ICD-10-PCS | Mod: S$GLB,,, | Performed by: INTERNAL MEDICINE

## 2021-06-03 PROCEDURE — 99213 OFFICE O/P EST LOW 20 MIN: CPT | Mod: S$GLB,,, | Performed by: INTERNAL MEDICINE

## 2021-06-03 PROCEDURE — 87591 N.GONORRHOEAE DNA AMP PROB: CPT | Performed by: OBSTETRICS & GYNECOLOGY

## 2021-06-03 PROCEDURE — 3008F PR BODY MASS INDEX (BMI) DOCUMENTED: ICD-10-PCS | Mod: CPTII,S$GLB,, | Performed by: OBSTETRICS & GYNECOLOGY

## 2021-06-03 PROCEDURE — 3008F BODY MASS INDEX DOCD: CPT | Mod: CPTII,S$GLB,, | Performed by: INTERNAL MEDICINE

## 2021-06-03 PROCEDURE — 1125F PR PAIN SEVERITY QUANTIFIED, PAIN PRESENT: ICD-10-PCS | Mod: S$GLB,,, | Performed by: INTERNAL MEDICINE

## 2021-06-03 PROCEDURE — 87086 URINE CULTURE/COLONY COUNT: CPT | Performed by: OBSTETRICS & GYNECOLOGY

## 2021-06-03 PROCEDURE — 87491 CHLMYD TRACH DNA AMP PROBE: CPT | Performed by: OBSTETRICS & GYNECOLOGY

## 2021-06-03 PROCEDURE — 99999 PR PBB SHADOW E&M-EST. PATIENT-LVL IV: CPT | Mod: PBBFAC,,, | Performed by: OBSTETRICS & GYNECOLOGY

## 2021-06-03 PROCEDURE — 99999 PR PBB SHADOW E&M-EST. PATIENT-LVL IV: ICD-10-PCS | Mod: PBBFAC,,, | Performed by: OBSTETRICS & GYNECOLOGY

## 2021-06-03 PROCEDURE — 99999 PR PBB SHADOW E&M-EST. PATIENT-LVL V: ICD-10-PCS | Mod: PBBFAC,,, | Performed by: INTERNAL MEDICINE

## 2021-06-03 PROCEDURE — 99213 OFFICE O/P EST LOW 20 MIN: CPT | Mod: S$GLB,,, | Performed by: OBSTETRICS & GYNECOLOGY

## 2021-06-03 PROCEDURE — 1126F PR PAIN SEVERITY QUANTIFIED, NO PAIN PRESENT: ICD-10-PCS | Mod: S$GLB,,, | Performed by: OBSTETRICS & GYNECOLOGY

## 2021-06-03 PROCEDURE — 99213 PR OFFICE/OUTPT VISIT, EST, LEVL III, 20-29 MIN: ICD-10-PCS | Mod: S$GLB,,, | Performed by: OBSTETRICS & GYNECOLOGY

## 2021-06-03 PROCEDURE — 3008F PR BODY MASS INDEX (BMI) DOCUMENTED: ICD-10-PCS | Mod: CPTII,S$GLB,, | Performed by: INTERNAL MEDICINE

## 2021-06-03 PROCEDURE — 3008F BODY MASS INDEX DOCD: CPT | Mod: CPTII,S$GLB,, | Performed by: OBSTETRICS & GYNECOLOGY

## 2021-06-03 PROCEDURE — 1125F AMNT PAIN NOTED PAIN PRSNT: CPT | Mod: S$GLB,,, | Performed by: INTERNAL MEDICINE

## 2021-06-03 PROCEDURE — 3044F HG A1C LEVEL LT 7.0%: CPT | Mod: CPTII,S$GLB,, | Performed by: INTERNAL MEDICINE

## 2021-06-03 PROCEDURE — 99999 PR PBB SHADOW E&M-EST. PATIENT-LVL V: CPT | Mod: PBBFAC,,, | Performed by: INTERNAL MEDICINE

## 2021-06-03 RX ORDER — LUBIPROSTONE 24 UG/1
CAPSULE ORAL
COMMUNITY
Start: 2021-05-11 | End: 2022-03-08

## 2021-06-03 RX ORDER — LOSARTAN POTASSIUM 100 MG/1
TABLET ORAL
COMMUNITY
End: 2021-08-25 | Stop reason: SDUPTHER

## 2021-06-03 RX ORDER — SEMAGLUTIDE 1.34 MG/ML
INJECTION, SOLUTION SUBCUTANEOUS
COMMUNITY
Start: 2021-05-30 | End: 2021-06-03

## 2021-06-03 RX ORDER — SEMAGLUTIDE 1.34 MG/ML
1 INJECTION, SOLUTION SUBCUTANEOUS
Qty: 3 PEN | Refills: 1 | Status: SHIPPED | OUTPATIENT
Start: 2021-06-03 | End: 2021-09-27 | Stop reason: SDUPTHER

## 2021-06-03 RX ORDER — NALOXEGOL OXALATE 12.5 MG/1
1 TABLET, FILM COATED ORAL DAILY
COMMUNITY
Start: 2021-04-02 | End: 2022-11-02

## 2021-06-03 RX ORDER — MONTELUKAST SODIUM 10 MG/1
TABLET ORAL
COMMUNITY
Start: 2021-05-29 | End: 2021-06-18

## 2021-06-03 RX ORDER — DESLORATADINE 5 MG/1
TABLET ORAL
COMMUNITY
Start: 2021-04-28 | End: 2021-06-08

## 2021-06-03 RX ORDER — ONDANSETRON 2 MG/ML
INJECTION INTRAMUSCULAR; INTRAVENOUS
COMMUNITY
Start: 2020-12-14 | End: 2021-08-25 | Stop reason: SDUPTHER

## 2021-06-03 RX ORDER — DEXAMETHASONE SODIUM PHOSPHATE 4 MG/ML
INJECTION, SOLUTION INTRA-ARTICULAR; INTRALESIONAL; INTRAMUSCULAR; INTRAVENOUS; SOFT TISSUE
COMMUNITY
Start: 2021-01-06 | End: 2021-10-19 | Stop reason: CLARIF

## 2021-06-04 ENCOUNTER — HOSPITAL ENCOUNTER (OUTPATIENT)
Dept: RADIOLOGY | Facility: OTHER | Age: 51
Discharge: HOME | End: 2021-06-04
Attending: OBSTETRICS & GYNECOLOGY
Payer: MEDICARE

## 2021-06-04 DIAGNOSIS — R10.2 PELVIC PAIN IN FEMALE: Primary | ICD-10-CM

## 2021-06-04 DIAGNOSIS — R14.0 ABDOMINAL DISTENSION (GASEOUS): ICD-10-CM

## 2021-06-04 PROCEDURE — 25500020 PHARM REV CODE 255: Performed by: OBSTETRICS & GYNECOLOGY

## 2021-06-04 PROCEDURE — A9698 NON-RAD CONTRAST MATERIALNOC: HCPCS | Performed by: OBSTETRICS & GYNECOLOGY

## 2021-06-04 PROCEDURE — 74177 CT ABDOMEN PELVIS WITH CONTRAST: ICD-10-PCS | Mod: 26,,, | Performed by: RADIOLOGY

## 2021-06-04 PROCEDURE — 74177 CT ABD & PELVIS W/CONTRAST: CPT | Mod: 26,,, | Performed by: RADIOLOGY

## 2021-06-04 PROCEDURE — 74177 CT ABD & PELVIS W/CONTRAST: CPT | Mod: TC

## 2021-06-04 RX ADMIN — IOHEXOL 1000 ML: 12 SOLUTION ORAL at 03:06

## 2021-06-04 RX ADMIN — IOHEXOL 100 ML: 350 INJECTION, SOLUTION INTRAVENOUS at 04:06

## 2021-06-06 LAB
BACTERIA UR CULT: NORMAL
BACTERIA UR CULT: NORMAL

## 2021-06-08 ENCOUNTER — OFFICE VISIT (OUTPATIENT)
Dept: FAMILY MEDICINE | Facility: CLINIC | Age: 51
End: 2021-06-08
Payer: MEDICARE

## 2021-06-08 VITALS
HEIGHT: 64 IN | BODY MASS INDEX: 50.02 KG/M2 | RESPIRATION RATE: 19 BRPM | TEMPERATURE: 98 F | SYSTOLIC BLOOD PRESSURE: 126 MMHG | WEIGHT: 293 LBS | HEART RATE: 78 BPM | OXYGEN SATURATION: 98 % | DIASTOLIC BLOOD PRESSURE: 82 MMHG

## 2021-06-08 DIAGNOSIS — E11.22 TYPE 2 DIABETES MELLITUS WITH STAGE 3A CHRONIC KIDNEY DISEASE, WITH LONG-TERM CURRENT USE OF INSULIN: ICD-10-CM

## 2021-06-08 DIAGNOSIS — Z00.00 ENCOUNTER FOR PREVENTIVE HEALTH EXAMINATION: Primary | ICD-10-CM

## 2021-06-08 DIAGNOSIS — Z79.4 TYPE 2 DIABETES MELLITUS WITH STAGE 3A CHRONIC KIDNEY DISEASE, WITH LONG-TERM CURRENT USE OF INSULIN: ICD-10-CM

## 2021-06-08 DIAGNOSIS — J32.9 RECURRENT SINUSITIS: ICD-10-CM

## 2021-06-08 DIAGNOSIS — G40.909 NONINTRACTABLE EPILEPSY WITHOUT STATUS EPILEPTICUS, UNSPECIFIED EPILEPSY TYPE: Chronic | ICD-10-CM

## 2021-06-08 DIAGNOSIS — N18.31 TYPE 2 DIABETES MELLITUS WITH STAGE 3A CHRONIC KIDNEY DISEASE, WITH LONG-TERM CURRENT USE OF INSULIN: ICD-10-CM

## 2021-06-08 DIAGNOSIS — F33.42 RECURRENT MAJOR DEPRESSIVE DISORDER, IN FULL REMISSION: ICD-10-CM

## 2021-06-08 PROBLEM — J10.1 INFLUENZA A: Status: RESOLVED | Noted: 2020-01-16 | Resolved: 2021-06-08

## 2021-06-08 PROBLEM — G89.29 CHRONIC PAIN: Status: RESOLVED | Noted: 2020-06-24 | Resolved: 2021-06-08

## 2021-06-08 LAB
C TRACH DNA SPEC QL NAA+PROBE: NOT DETECTED
N GONORRHOEA DNA SPEC QL NAA+PROBE: NOT DETECTED

## 2021-06-08 PROCEDURE — G0439 PR MEDICARE ANNUAL WELLNESS SUBSEQUENT VISIT: ICD-10-PCS | Mod: S$GLB,,, | Performed by: NURSE PRACTITIONER

## 2021-06-08 PROCEDURE — 99999 PR PBB SHADOW E&M-EST. PATIENT-LVL V: CPT | Mod: PBBFAC,,, | Performed by: NURSE PRACTITIONER

## 2021-06-08 PROCEDURE — G9919 SCRN ND POS ND PROV OF REC: HCPCS | Mod: CPTII,S$GLB,, | Performed by: NURSE PRACTITIONER

## 2021-06-08 PROCEDURE — 1126F PR PAIN SEVERITY QUANTIFIED, NO PAIN PRESENT: ICD-10-PCS | Mod: S$GLB,,, | Performed by: NURSE PRACTITIONER

## 2021-06-08 PROCEDURE — 99499 UNLISTED E&M SERVICE: CPT | Mod: S$GLB,,, | Performed by: NURSE PRACTITIONER

## 2021-06-08 PROCEDURE — G0439 PPPS, SUBSEQ VISIT: HCPCS | Mod: S$GLB,,, | Performed by: NURSE PRACTITIONER

## 2021-06-08 PROCEDURE — 1126F AMNT PAIN NOTED NONE PRSNT: CPT | Mod: S$GLB,,, | Performed by: NURSE PRACTITIONER

## 2021-06-08 PROCEDURE — 99499 RISK ADDL DX/OHS AUDIT: ICD-10-PCS | Mod: S$GLB,,, | Performed by: NURSE PRACTITIONER

## 2021-06-08 PROCEDURE — 99999 PR PBB SHADOW E&M-EST. PATIENT-LVL V: ICD-10-PCS | Mod: PBBFAC,,, | Performed by: NURSE PRACTITIONER

## 2021-06-08 PROCEDURE — 3008F PR BODY MASS INDEX (BMI) DOCUMENTED: ICD-10-PCS | Mod: CPTII,S$GLB,, | Performed by: NURSE PRACTITIONER

## 2021-06-08 PROCEDURE — G9919 PR SCREENING AND POSITIVE: ICD-10-PCS | Mod: CPTII,S$GLB,, | Performed by: NURSE PRACTITIONER

## 2021-06-08 PROCEDURE — 3008F BODY MASS INDEX DOCD: CPT | Mod: CPTII,S$GLB,, | Performed by: NURSE PRACTITIONER

## 2021-06-09 ENCOUNTER — OFFICE VISIT (OUTPATIENT)
Dept: OPTOMETRY | Facility: CLINIC | Age: 51
End: 2021-06-09
Payer: MEDICARE

## 2021-06-09 ENCOUNTER — CLINICAL SUPPORT (OUTPATIENT)
Dept: OBSTETRICS AND GYNECOLOGY | Facility: CLINIC | Age: 51
End: 2021-06-09
Payer: MEDICARE

## 2021-06-09 ENCOUNTER — OFFICE VISIT (OUTPATIENT)
Dept: PODIATRY | Facility: CLINIC | Age: 51
End: 2021-06-09
Payer: MEDICARE

## 2021-06-09 VITALS
BODY MASS INDEX: 57.03 KG/M2 | DIASTOLIC BLOOD PRESSURE: 100 MMHG | WEIGHT: 293 LBS | SYSTOLIC BLOOD PRESSURE: 144 MMHG | HEART RATE: 82 BPM

## 2021-06-09 DIAGNOSIS — E11.22 TYPE 2 DIABETES MELLITUS WITH STAGE 3A CHRONIC KIDNEY DISEASE, WITH LONG-TERM CURRENT USE OF INSULIN: Primary | ICD-10-CM

## 2021-06-09 DIAGNOSIS — Z79.4 TYPE 2 DIABETES MELLITUS WITH STAGE 3A CHRONIC KIDNEY DISEASE, WITH LONG-TERM CURRENT USE OF INSULIN: Primary | ICD-10-CM

## 2021-06-09 DIAGNOSIS — R10.2 PELVIC PAIN IN FEMALE: Primary | ICD-10-CM

## 2021-06-09 DIAGNOSIS — G57.82 NERVE ENTRAPMENT OF LOWER LIMB, LEFT: ICD-10-CM

## 2021-06-09 DIAGNOSIS — Z01.00 DIABETIC EYE EXAM: Primary | ICD-10-CM

## 2021-06-09 DIAGNOSIS — G57.52 TARSAL TUNNEL SYNDROME OF LEFT SIDE: ICD-10-CM

## 2021-06-09 DIAGNOSIS — E11.9 DIABETIC EYE EXAM: Primary | ICD-10-CM

## 2021-06-09 DIAGNOSIS — G93.2 IDIOPATHIC INTRACRANIAL HYPERTENSION: ICD-10-CM

## 2021-06-09 DIAGNOSIS — H52.7 REFRACTIVE ERROR: ICD-10-CM

## 2021-06-09 DIAGNOSIS — M25.572 SINUS TARSI SYNDROME OF LEFT ANKLE: ICD-10-CM

## 2021-06-09 DIAGNOSIS — N18.31 TYPE 2 DIABETES MELLITUS WITH STAGE 3A CHRONIC KIDNEY DISEASE, WITH LONG-TERM CURRENT USE OF INSULIN: Primary | ICD-10-CM

## 2021-06-09 LAB
LEFT EYE DM RETINOPATHY: NEGATIVE
RIGHT EYE DM RETINOPATHY: NEGATIVE

## 2021-06-09 PROCEDURE — 87086 URINE CULTURE/COLONY COUNT: CPT | Performed by: OBSTETRICS & GYNECOLOGY

## 2021-06-09 PROCEDURE — 99214 OFFICE O/P EST MOD 30 MIN: CPT | Mod: 25,S$GLB,, | Performed by: PODIATRIST

## 2021-06-09 PROCEDURE — 3008F BODY MASS INDEX DOCD: CPT | Mod: CPTII,S$GLB,, | Performed by: PODIATRIST

## 2021-06-09 PROCEDURE — 92015 DETERMINE REFRACTIVE STATE: CPT | Mod: S$GLB,,, | Performed by: OPTOMETRIST

## 2021-06-09 PROCEDURE — 92014 COMPRE OPH EXAM EST PT 1/>: CPT | Mod: S$GLB,,, | Performed by: OPTOMETRIST

## 2021-06-09 PROCEDURE — 1126F PR PAIN SEVERITY QUANTIFIED, NO PAIN PRESENT: ICD-10-PCS | Mod: S$GLB,,, | Performed by: OPTOMETRIST

## 2021-06-09 PROCEDURE — 99999 PR PBB SHADOW E&M-EST. PATIENT-LVL III: ICD-10-PCS | Mod: PBBFAC,,, | Performed by: OBSTETRICS & GYNECOLOGY

## 2021-06-09 PROCEDURE — 3044F HG A1C LEVEL LT 7.0%: CPT | Mod: CPTII,S$GLB,, | Performed by: PODIATRIST

## 2021-06-09 PROCEDURE — 99999 PR PBB SHADOW E&M-EST. PATIENT-LVL IV: CPT | Mod: PBBFAC,,, | Performed by: PODIATRIST

## 2021-06-09 PROCEDURE — 1126F AMNT PAIN NOTED NONE PRSNT: CPT | Mod: S$GLB,,, | Performed by: OPTOMETRIST

## 2021-06-09 PROCEDURE — 99999 PR PBB SHADOW E&M-EST. PATIENT-LVL III: ICD-10-PCS | Mod: PBBFAC,,, | Performed by: OPTOMETRIST

## 2021-06-09 PROCEDURE — 3008F PR BODY MASS INDEX (BMI) DOCUMENTED: ICD-10-PCS | Mod: CPTII,S$GLB,, | Performed by: PODIATRIST

## 2021-06-09 PROCEDURE — 1125F AMNT PAIN NOTED PAIN PRSNT: CPT | Mod: S$GLB,,, | Performed by: PODIATRIST

## 2021-06-09 PROCEDURE — 92015 PR REFRACTION: ICD-10-PCS | Mod: S$GLB,,, | Performed by: OPTOMETRIST

## 2021-06-09 PROCEDURE — 99999 PR PBB SHADOW E&M-EST. PATIENT-LVL III: CPT | Mod: PBBFAC,,, | Performed by: OPTOMETRIST

## 2021-06-09 PROCEDURE — 99214 PR OFFICE/OUTPT VISIT, EST, LEVL IV, 30-39 MIN: ICD-10-PCS | Mod: 25,S$GLB,, | Performed by: PODIATRIST

## 2021-06-09 PROCEDURE — 64450 PR NERVE BLOCK INJ, ANES/STEROID, OTHER PERIPHERAL: ICD-10-PCS | Mod: LT,S$GLB,, | Performed by: PODIATRIST

## 2021-06-09 PROCEDURE — 64450 NJX AA&/STRD OTHER PN/BRANCH: CPT | Mod: LT,S$GLB,, | Performed by: PODIATRIST

## 2021-06-09 PROCEDURE — 99999 PR PBB SHADOW E&M-EST. PATIENT-LVL IV: ICD-10-PCS | Mod: PBBFAC,,, | Performed by: PODIATRIST

## 2021-06-09 PROCEDURE — 3044F PR MOST RECENT HEMOGLOBIN A1C LEVEL <7.0%: ICD-10-PCS | Mod: CPTII,S$GLB,, | Performed by: PODIATRIST

## 2021-06-09 PROCEDURE — 92014 PR EYE EXAM, EST PATIENT,COMPREHESV: ICD-10-PCS | Mod: S$GLB,,, | Performed by: OPTOMETRIST

## 2021-06-09 PROCEDURE — 99999 PR PBB SHADOW E&M-EST. PATIENT-LVL III: CPT | Mod: PBBFAC,,, | Performed by: OBSTETRICS & GYNECOLOGY

## 2021-06-09 PROCEDURE — 1125F PR PAIN SEVERITY QUANTIFIED, PAIN PRESENT: ICD-10-PCS | Mod: S$GLB,,, | Performed by: PODIATRIST

## 2021-06-10 LAB — BACTERIA UR CULT: NO GROWTH

## 2021-06-11 ENCOUNTER — OFFICE VISIT (OUTPATIENT)
Dept: OTOLARYNGOLOGY | Facility: CLINIC | Age: 51
End: 2021-06-11
Payer: MEDICARE

## 2021-06-11 VITALS
BODY MASS INDEX: 50.02 KG/M2 | WEIGHT: 293 LBS | SYSTOLIC BLOOD PRESSURE: 120 MMHG | TEMPERATURE: 98 F | DIASTOLIC BLOOD PRESSURE: 80 MMHG | HEIGHT: 64 IN

## 2021-06-11 DIAGNOSIS — J01.91 ACUTE RECURRENT SINUSITIS, UNSPECIFIED LOCATION: Primary | ICD-10-CM

## 2021-06-11 DIAGNOSIS — H61.23 BILATERAL IMPACTED CERUMEN: ICD-10-CM

## 2021-06-11 DIAGNOSIS — J34.89 OTHER SPECIFIED DISORDERS OF NOSE AND NASAL SINUSES: ICD-10-CM

## 2021-06-11 DIAGNOSIS — J32.9 RECURRENT SINUSITIS: ICD-10-CM

## 2021-06-11 PROCEDURE — 69210 EAR CERUMEN REMOVAL: ICD-10-PCS | Mod: S$GLB,,, | Performed by: NURSE PRACTITIONER

## 2021-06-11 PROCEDURE — 99214 PR OFFICE/OUTPT VISIT, EST, LEVL IV, 30-39 MIN: ICD-10-PCS | Mod: 25,S$GLB,, | Performed by: NURSE PRACTITIONER

## 2021-06-11 PROCEDURE — 99214 OFFICE O/P EST MOD 30 MIN: CPT | Mod: 25,S$GLB,, | Performed by: NURSE PRACTITIONER

## 2021-06-11 PROCEDURE — 3008F PR BODY MASS INDEX (BMI) DOCUMENTED: ICD-10-PCS | Mod: CPTII,S$GLB,, | Performed by: NURSE PRACTITIONER

## 2021-06-11 PROCEDURE — 1125F PR PAIN SEVERITY QUANTIFIED, PAIN PRESENT: ICD-10-PCS | Mod: S$GLB,,, | Performed by: NURSE PRACTITIONER

## 2021-06-11 PROCEDURE — 3008F BODY MASS INDEX DOCD: CPT | Mod: CPTII,S$GLB,, | Performed by: NURSE PRACTITIONER

## 2021-06-11 PROCEDURE — 1125F AMNT PAIN NOTED PAIN PRSNT: CPT | Mod: S$GLB,,, | Performed by: NURSE PRACTITIONER

## 2021-06-11 PROCEDURE — 69210 REMOVE IMPACTED EAR WAX UNI: CPT | Mod: S$GLB,,, | Performed by: NURSE PRACTITIONER

## 2021-06-11 RX ORDER — DOXYCYCLINE HYCLATE 100 MG
100 TABLET ORAL EVERY 12 HOURS
Qty: 20 TABLET | Refills: 0 | Status: SHIPPED | OUTPATIENT
Start: 2021-06-11 | End: 2021-06-21

## 2021-06-14 ENCOUNTER — TELEPHONE (OUTPATIENT)
Dept: OPTOMETRY | Facility: CLINIC | Age: 51
End: 2021-06-14

## 2021-06-18 ENCOUNTER — HOSPITAL ENCOUNTER (OUTPATIENT)
Dept: RADIOLOGY | Facility: HOSPITAL | Age: 51
Discharge: HOME | End: 2021-06-18
Attending: NURSE PRACTITIONER
Payer: MEDICARE

## 2021-06-18 DIAGNOSIS — J32.9 RECURRENT SINUSITIS: ICD-10-CM

## 2021-06-18 DIAGNOSIS — J34.89 OTHER SPECIFIED DISORDERS OF NOSE AND NASAL SINUSES: ICD-10-CM

## 2021-06-18 PROCEDURE — 70486 CT MEDTRONIC SINUSES WITHOUT: ICD-10-PCS | Mod: 26,,, | Performed by: RADIOLOGY

## 2021-06-18 PROCEDURE — 70486 CT MAXILLOFACIAL W/O DYE: CPT | Mod: TC

## 2021-06-18 PROCEDURE — 70486 CT MAXILLOFACIAL W/O DYE: CPT | Mod: 26,,, | Performed by: RADIOLOGY

## 2021-06-22 ENCOUNTER — OFFICE VISIT (OUTPATIENT)
Dept: OTOLARYNGOLOGY | Facility: CLINIC | Age: 51
End: 2021-06-22
Payer: MEDICARE

## 2021-06-22 VITALS
BODY MASS INDEX: 50.02 KG/M2 | SYSTOLIC BLOOD PRESSURE: 149 MMHG | TEMPERATURE: 98 F | HEIGHT: 64 IN | HEART RATE: 84 BPM | DIASTOLIC BLOOD PRESSURE: 96 MMHG | WEIGHT: 293 LBS

## 2021-06-22 DIAGNOSIS — J32.9 RECURRENT SINUSITIS: ICD-10-CM

## 2021-06-22 DIAGNOSIS — J30.9 ALLERGIC RHINITIS, UNSPECIFIED SEASONALITY, UNSPECIFIED TRIGGER: ICD-10-CM

## 2021-06-22 DIAGNOSIS — J32.9 CHRONIC RECURRENT SINUSITIS: Primary | ICD-10-CM

## 2021-06-22 PROCEDURE — 3008F BODY MASS INDEX DOCD: CPT | Mod: CPTII,S$GLB,, | Performed by: OTOLARYNGOLOGY

## 2021-06-22 PROCEDURE — 1125F AMNT PAIN NOTED PAIN PRSNT: CPT | Mod: S$GLB,,, | Performed by: OTOLARYNGOLOGY

## 2021-06-22 PROCEDURE — 99214 OFFICE O/P EST MOD 30 MIN: CPT | Mod: S$GLB,,, | Performed by: OTOLARYNGOLOGY

## 2021-06-22 PROCEDURE — 99214 PR OFFICE/OUTPT VISIT, EST, LEVL IV, 30-39 MIN: ICD-10-PCS | Mod: S$GLB,,, | Performed by: OTOLARYNGOLOGY

## 2021-06-22 PROCEDURE — 1125F PR PAIN SEVERITY QUANTIFIED, PAIN PRESENT: ICD-10-PCS | Mod: S$GLB,,, | Performed by: OTOLARYNGOLOGY

## 2021-06-22 PROCEDURE — 3008F PR BODY MASS INDEX (BMI) DOCUMENTED: ICD-10-PCS | Mod: CPTII,S$GLB,, | Performed by: OTOLARYNGOLOGY

## 2021-06-22 RX ORDER — AMOXICILLIN AND CLAVULANATE POTASSIUM 875; 125 MG/1; MG/1
1 TABLET, FILM COATED ORAL 2 TIMES DAILY
Qty: 20 TABLET | Refills: 0 | Status: SHIPPED | OUTPATIENT
Start: 2021-06-22 | End: 2021-07-02

## 2021-06-24 DIAGNOSIS — J32.9 CHRONIC RECURRENT SINUSITIS: Primary | ICD-10-CM

## 2021-06-24 DIAGNOSIS — J32.9 RECURRENT SINUSITIS: ICD-10-CM

## 2021-06-24 DIAGNOSIS — Z01.818 PRE-OP TESTING: Primary | ICD-10-CM

## 2021-06-24 DIAGNOSIS — J30.9 ALLERGIC RHINITIS, UNSPECIFIED SEASONALITY, UNSPECIFIED TRIGGER: ICD-10-CM

## 2021-07-09 ENCOUNTER — PATIENT MESSAGE (OUTPATIENT)
Dept: NEUROLOGY | Facility: CLINIC | Age: 51
End: 2021-07-09

## 2021-07-12 RX ORDER — TOPIRAMATE 200 MG/1
CAPSULE, EXTENDED RELEASE ORAL
COMMUNITY
Start: 2021-07-09 | End: 2022-04-06

## 2021-07-13 ENCOUNTER — OFFICE VISIT (OUTPATIENT)
Dept: GASTROENTEROLOGY | Facility: CLINIC | Age: 51
End: 2021-07-13
Payer: MEDICARE

## 2021-07-13 VITALS
WEIGHT: 293 LBS | SYSTOLIC BLOOD PRESSURE: 132 MMHG | BODY MASS INDEX: 50.02 KG/M2 | HEART RATE: 86 BPM | HEIGHT: 64 IN | DIASTOLIC BLOOD PRESSURE: 87 MMHG

## 2021-07-13 DIAGNOSIS — K59.00 CONSTIPATION, UNSPECIFIED CONSTIPATION TYPE: ICD-10-CM

## 2021-07-13 DIAGNOSIS — R68.81 EARLY SATIETY: ICD-10-CM

## 2021-07-13 DIAGNOSIS — K21.9 GASTROESOPHAGEAL REFLUX DISEASE WITHOUT ESOPHAGITIS: ICD-10-CM

## 2021-07-13 DIAGNOSIS — R13.10 DYSPHAGIA, UNSPECIFIED TYPE: Primary | ICD-10-CM

## 2021-07-13 DIAGNOSIS — A04.8 H. PYLORI INFECTION: ICD-10-CM

## 2021-07-13 DIAGNOSIS — F41.9 ANXIETY: ICD-10-CM

## 2021-07-13 DIAGNOSIS — R11.0 NAUSEA: ICD-10-CM

## 2021-07-13 PROCEDURE — 99215 OFFICE O/P EST HI 40 MIN: CPT | Mod: S$GLB,,, | Performed by: INTERNAL MEDICINE

## 2021-07-13 PROCEDURE — 99499 RISK ADDL DX/OHS AUDIT: ICD-10-PCS | Mod: S$PBB,HCNC,, | Performed by: INTERNAL MEDICINE

## 2021-07-13 PROCEDURE — 3008F BODY MASS INDEX DOCD: CPT | Mod: CPTII,S$GLB,, | Performed by: INTERNAL MEDICINE

## 2021-07-13 PROCEDURE — 1125F AMNT PAIN NOTED PAIN PRSNT: CPT | Mod: S$GLB,,, | Performed by: INTERNAL MEDICINE

## 2021-07-13 PROCEDURE — 99999 PR PBB SHADOW E&M-EST. PATIENT-LVL V: ICD-10-PCS | Mod: PBBFAC,,, | Performed by: INTERNAL MEDICINE

## 2021-07-13 PROCEDURE — 99499 UNLISTED E&M SERVICE: CPT | Mod: S$PBB,HCNC,, | Performed by: INTERNAL MEDICINE

## 2021-07-13 PROCEDURE — 3008F PR BODY MASS INDEX (BMI) DOCUMENTED: ICD-10-PCS | Mod: CPTII,S$GLB,, | Performed by: INTERNAL MEDICINE

## 2021-07-13 PROCEDURE — 1125F PR PAIN SEVERITY QUANTIFIED, PAIN PRESENT: ICD-10-PCS | Mod: S$GLB,,, | Performed by: INTERNAL MEDICINE

## 2021-07-13 PROCEDURE — 99215 PR OFFICE/OUTPT VISIT, EST, LEVL V, 40-54 MIN: ICD-10-PCS | Mod: S$GLB,,, | Performed by: INTERNAL MEDICINE

## 2021-07-13 PROCEDURE — 99999 PR PBB SHADOW E&M-EST. PATIENT-LVL V: CPT | Mod: PBBFAC,,, | Performed by: INTERNAL MEDICINE

## 2021-07-14 ENCOUNTER — TELEPHONE (OUTPATIENT)
Dept: PHARMACY | Facility: CLINIC | Age: 51
End: 2021-07-14

## 2021-07-14 ENCOUNTER — PATIENT MESSAGE (OUTPATIENT)
Dept: PHARMACY | Facility: CLINIC | Age: 51
End: 2021-07-14

## 2021-07-21 DIAGNOSIS — E11.9 TYPE 2 DIABETES MELLITUS WITHOUT COMPLICATION: ICD-10-CM

## 2021-08-03 DIAGNOSIS — Z01.818 PRE-OP TESTING: ICD-10-CM

## 2021-08-05 ENCOUNTER — TELEPHONE (OUTPATIENT)
Dept: OTOLARYNGOLOGY | Facility: CLINIC | Age: 51
End: 2021-08-05

## 2021-08-06 ENCOUNTER — TELEPHONE (OUTPATIENT)
Dept: OTOLARYNGOLOGY | Facility: CLINIC | Age: 51
End: 2021-08-06

## 2021-08-07 ENCOUNTER — PATIENT OUTREACH (OUTPATIENT)
Dept: ADMINISTRATIVE | Facility: OTHER | Age: 51
End: 2021-08-07

## 2021-08-07 DIAGNOSIS — Z12.31 BREAST CANCER SCREENING BY MAMMOGRAM: Primary | ICD-10-CM

## 2021-08-10 LAB — CRC RECOMMENDATION EXT: NORMAL

## 2021-08-11 ENCOUNTER — OFFICE VISIT (OUTPATIENT)
Dept: PODIATRY | Facility: CLINIC | Age: 51
End: 2021-08-11
Payer: MEDICARE

## 2021-08-11 VITALS
DIASTOLIC BLOOD PRESSURE: 91 MMHG | WEIGHT: 293 LBS | HEART RATE: 64 BPM | SYSTOLIC BLOOD PRESSURE: 140 MMHG | BODY MASS INDEX: 59.71 KG/M2

## 2021-08-11 DIAGNOSIS — G57.82 NERVE ENTRAPMENT OF LOWER LIMB, LEFT: Primary | ICD-10-CM

## 2021-08-11 DIAGNOSIS — G89.4 CHRONIC PAIN SYNDROME: ICD-10-CM

## 2021-08-11 PROCEDURE — 99214 OFFICE O/P EST MOD 30 MIN: CPT | Mod: S$GLB,,, | Performed by: PODIATRIST

## 2021-08-11 PROCEDURE — 3044F PR MOST RECENT HEMOGLOBIN A1C LEVEL <7.0%: ICD-10-PCS | Mod: CPTII,S$GLB,, | Performed by: PODIATRIST

## 2021-08-11 PROCEDURE — 3008F PR BODY MASS INDEX (BMI) DOCUMENTED: ICD-10-PCS | Mod: CPTII,S$GLB,, | Performed by: PODIATRIST

## 2021-08-11 PROCEDURE — 3077F PR MOST RECENT SYSTOLIC BLOOD PRESSURE >= 140 MM HG: ICD-10-PCS | Mod: CPTII,S$GLB,, | Performed by: PODIATRIST

## 2021-08-11 PROCEDURE — 3008F BODY MASS INDEX DOCD: CPT | Mod: CPTII,S$GLB,, | Performed by: PODIATRIST

## 2021-08-11 PROCEDURE — 99999 PR PBB SHADOW E&M-EST. PATIENT-LVL V: ICD-10-PCS | Mod: PBBFAC,,, | Performed by: PODIATRIST

## 2021-08-11 PROCEDURE — 99999 PR PBB SHADOW E&M-EST. PATIENT-LVL V: CPT | Mod: PBBFAC,,, | Performed by: PODIATRIST

## 2021-08-11 PROCEDURE — 1125F PR PAIN SEVERITY QUANTIFIED, PAIN PRESENT: ICD-10-PCS | Mod: CPTII,S$GLB,, | Performed by: PODIATRIST

## 2021-08-11 PROCEDURE — 3077F SYST BP >= 140 MM HG: CPT | Mod: CPTII,S$GLB,, | Performed by: PODIATRIST

## 2021-08-11 PROCEDURE — 1125F AMNT PAIN NOTED PAIN PRSNT: CPT | Mod: CPTII,S$GLB,, | Performed by: PODIATRIST

## 2021-08-11 PROCEDURE — 99214 PR OFFICE/OUTPT VISIT, EST, LEVL IV, 30-39 MIN: ICD-10-PCS | Mod: S$GLB,,, | Performed by: PODIATRIST

## 2021-08-11 PROCEDURE — 3080F DIAST BP >= 90 MM HG: CPT | Mod: CPTII,S$GLB,, | Performed by: PODIATRIST

## 2021-08-11 PROCEDURE — 3080F PR MOST RECENT DIASTOLIC BLOOD PRESSURE >= 90 MM HG: ICD-10-PCS | Mod: CPTII,S$GLB,, | Performed by: PODIATRIST

## 2021-08-11 PROCEDURE — 3044F HG A1C LEVEL LT 7.0%: CPT | Mod: CPTII,S$GLB,, | Performed by: PODIATRIST

## 2021-08-11 PROCEDURE — 1160F RVW MEDS BY RX/DR IN RCRD: CPT | Mod: CPTII,S$GLB,, | Performed by: PODIATRIST

## 2021-08-11 PROCEDURE — 1159F PR MEDICATION LIST DOCUMENTED IN MEDICAL RECORD: ICD-10-PCS | Mod: CPTII,S$GLB,, | Performed by: PODIATRIST

## 2021-08-11 PROCEDURE — 1159F MED LIST DOCD IN RCRD: CPT | Mod: CPTII,S$GLB,, | Performed by: PODIATRIST

## 2021-08-11 PROCEDURE — 1160F PR REVIEW ALL MEDS BY PRESCRIBER/CLIN PHARMACIST DOCUMENTED: ICD-10-PCS | Mod: CPTII,S$GLB,, | Performed by: PODIATRIST

## 2021-08-12 RX ORDER — PREGABALIN 50 MG/1
50 CAPSULE ORAL 2 TIMES DAILY
Qty: 60 CAPSULE | Refills: 6 | Status: SHIPPED | OUTPATIENT
Start: 2021-08-12 | End: 2021-09-23 | Stop reason: SDUPTHER

## 2021-08-16 ENCOUNTER — TELEPHONE (OUTPATIENT)
Dept: NEUROSURGERY | Facility: CLINIC | Age: 51
End: 2021-08-16

## 2021-08-16 ENCOUNTER — TELEPHONE (OUTPATIENT)
Dept: ORTHOPEDICS | Facility: CLINIC | Age: 51
End: 2021-08-16

## 2021-08-16 DIAGNOSIS — M51.369 DDD (DEGENERATIVE DISC DISEASE), LUMBAR: Primary | ICD-10-CM

## 2021-08-25 ENCOUNTER — LAB VISIT (OUTPATIENT)
Dept: LAB | Facility: HOSPITAL | Age: 51
End: 2021-08-25
Payer: MEDICARE

## 2021-08-25 ENCOUNTER — OFFICE VISIT (OUTPATIENT)
Dept: ALLERGY | Facility: CLINIC | Age: 51
End: 2021-08-25
Payer: MEDICARE

## 2021-08-25 VITALS — HEIGHT: 64 IN | BODY MASS INDEX: 50.02 KG/M2 | WEIGHT: 293 LBS

## 2021-08-25 DIAGNOSIS — J45.909 ASTHMA, UNSPECIFIED ASTHMA SEVERITY, UNSPECIFIED WHETHER COMPLICATED, UNSPECIFIED WHETHER PERSISTENT: ICD-10-CM

## 2021-08-25 DIAGNOSIS — J30.9 ALLERGIC RHINITIS, UNSPECIFIED SEASONALITY, UNSPECIFIED TRIGGER: ICD-10-CM

## 2021-08-25 DIAGNOSIS — J32.9 CHRONIC SINUSITIS, UNSPECIFIED LOCATION: Primary | ICD-10-CM

## 2021-08-25 DIAGNOSIS — R09.82 POST-NASAL DRIP: ICD-10-CM

## 2021-08-25 DIAGNOSIS — J45.50 SEVERE PERSISTENT ASTHMA, UNSPECIFIED WHETHER COMPLICATED: ICD-10-CM

## 2021-08-25 DIAGNOSIS — Z01.818 PRE-OP TESTING: ICD-10-CM

## 2021-08-25 DIAGNOSIS — F45.8 PRURITUS SINE MATERIA: ICD-10-CM

## 2021-08-25 DIAGNOSIS — J32.9 CHRONIC SINUSITIS, UNSPECIFIED LOCATION: ICD-10-CM

## 2021-08-25 LAB
IGA SERPL-MCNC: 213 MG/DL (ref 40–350)
IGG SERPL-MCNC: 1349 MG/DL (ref 650–1600)
IGM SERPL-MCNC: 32 MG/DL (ref 50–300)

## 2021-08-25 PROCEDURE — 99204 PR OFFICE/OUTPT VISIT, NEW, LEVL IV, 45-59 MIN: ICD-10-PCS | Mod: HCNC,GC,S$GLB, | Performed by: STUDENT IN AN ORGANIZED HEALTH CARE EDUCATION/TRAINING PROGRAM

## 2021-08-25 PROCEDURE — 99214 OFFICE O/P EST MOD 30 MIN: CPT | Mod: PBBFAC,HCNC | Performed by: STUDENT IN AN ORGANIZED HEALTH CARE EDUCATION/TRAINING PROGRAM

## 2021-08-25 PROCEDURE — 86317 IMMUNOASSAY INFECTIOUS AGENT: CPT | Performed by: STUDENT IN AN ORGANIZED HEALTH CARE EDUCATION/TRAINING PROGRAM

## 2021-08-25 PROCEDURE — 36415 COLL VENOUS BLD VENIPUNCTURE: CPT | Performed by: STUDENT IN AN ORGANIZED HEALTH CARE EDUCATION/TRAINING PROGRAM

## 2021-08-25 PROCEDURE — 99999 PR PBB SHADOW E&M-EST. PATIENT-LVL IV: ICD-10-PCS | Mod: PBBFAC,HCNC,GC, | Performed by: STUDENT IN AN ORGANIZED HEALTH CARE EDUCATION/TRAINING PROGRAM

## 2021-08-25 PROCEDURE — 99999 PR PBB SHADOW E&M-EST. PATIENT-LVL IV: CPT | Mod: PBBFAC,HCNC,GC, | Performed by: STUDENT IN AN ORGANIZED HEALTH CARE EDUCATION/TRAINING PROGRAM

## 2021-08-25 PROCEDURE — 99204 OFFICE O/P NEW MOD 45 MIN: CPT | Mod: HCNC,GC,S$GLB, | Performed by: STUDENT IN AN ORGANIZED HEALTH CARE EDUCATION/TRAINING PROGRAM

## 2021-08-25 PROCEDURE — 82784 ASSAY IGA/IGD/IGG/IGM EACH: CPT | Mod: 59 | Performed by: STUDENT IN AN ORGANIZED HEALTH CARE EDUCATION/TRAINING PROGRAM

## 2021-08-25 RX ORDER — AZELASTINE 1 MG/ML
1 SPRAY, METERED NASAL 2 TIMES DAILY
Qty: 30 ML | Refills: 11 | Status: SHIPPED | OUTPATIENT
Start: 2021-08-25 | End: 2022-03-14 | Stop reason: SDUPTHER

## 2021-08-25 RX ORDER — LEVOCETIRIZINE DIHYDROCHLORIDE 5 MG/1
5 TABLET, FILM COATED ORAL NIGHTLY
Qty: 90 TABLET | Refills: 3 | Status: SHIPPED | OUTPATIENT
Start: 2021-08-25 | End: 2021-10-27 | Stop reason: SDUPTHER

## 2021-08-25 RX ORDER — ALBUTEROL SULFATE 90 UG/1
2 AEROSOL, METERED RESPIRATORY (INHALATION) EVERY 6 HOURS PRN
Qty: 54 G | Refills: 6 | Status: SHIPPED | OUTPATIENT
Start: 2021-08-25 | End: 2021-09-15 | Stop reason: SDUPTHER

## 2021-08-25 RX ORDER — BUDESONIDE AND FORMOTEROL FUMARATE DIHYDRATE 160; 4.5 UG/1; UG/1
2 AEROSOL RESPIRATORY (INHALATION) EVERY 12 HOURS
Qty: 1 INHALER | Refills: 5 | Status: SHIPPED | OUTPATIENT
Start: 2021-08-25 | End: 2022-03-08 | Stop reason: SDUPTHER

## 2021-08-25 RX ORDER — FLUTICASONE PROPIONATE 50 MCG
2 SPRAY, SUSPENSION (ML) NASAL 2 TIMES DAILY
Qty: 15.8 ML | Refills: 11 | Status: SHIPPED | OUTPATIENT
Start: 2021-08-25 | End: 2021-10-19 | Stop reason: CLARIF

## 2021-08-25 RX ORDER — ALBUTEROL SULFATE 0.83 MG/ML
2.5 SOLUTION RESPIRATORY (INHALATION) EVERY 6 HOURS PRN
Qty: 3 ML | Refills: 11 | Status: SHIPPED | OUTPATIENT
Start: 2021-08-25 | End: 2022-03-08 | Stop reason: SDUPTHER

## 2021-08-26 ENCOUNTER — OFFICE VISIT (OUTPATIENT)
Dept: FAMILY MEDICINE | Facility: CLINIC | Age: 51
End: 2021-08-26
Payer: MEDICARE

## 2021-08-26 ENCOUNTER — LAB VISIT (OUTPATIENT)
Dept: LAB | Facility: HOSPITAL | Age: 51
End: 2021-08-26
Attending: FAMILY MEDICINE
Payer: MEDICARE

## 2021-08-26 VITALS
WEIGHT: 293 LBS | SYSTOLIC BLOOD PRESSURE: 128 MMHG | DIASTOLIC BLOOD PRESSURE: 86 MMHG | HEART RATE: 73 BPM | HEIGHT: 64 IN | OXYGEN SATURATION: 97 % | BODY MASS INDEX: 50.02 KG/M2 | RESPIRATION RATE: 16 BRPM

## 2021-08-26 DIAGNOSIS — E11.22 TYPE 2 DIABETES MELLITUS WITH STAGE 3A CHRONIC KIDNEY DISEASE, WITH LONG-TERM CURRENT USE OF INSULIN: ICD-10-CM

## 2021-08-26 DIAGNOSIS — Z12.31 ENCOUNTER FOR SCREENING MAMMOGRAM FOR MALIGNANT NEOPLASM OF BREAST: ICD-10-CM

## 2021-08-26 DIAGNOSIS — Z79.4 TYPE 2 DIABETES MELLITUS WITH STAGE 3A CHRONIC KIDNEY DISEASE, WITH LONG-TERM CURRENT USE OF INSULIN: Primary | ICD-10-CM

## 2021-08-26 DIAGNOSIS — N18.31 TYPE 2 DIABETES MELLITUS WITH STAGE 3A CHRONIC KIDNEY DISEASE, WITH LONG-TERM CURRENT USE OF INSULIN: ICD-10-CM

## 2021-08-26 DIAGNOSIS — Z79.4 TYPE 2 DIABETES MELLITUS WITH STAGE 3A CHRONIC KIDNEY DISEASE, WITH LONG-TERM CURRENT USE OF INSULIN: ICD-10-CM

## 2021-08-26 DIAGNOSIS — N18.31 TYPE 2 DIABETES MELLITUS WITH STAGE 3A CHRONIC KIDNEY DISEASE, WITH LONG-TERM CURRENT USE OF INSULIN: Primary | ICD-10-CM

## 2021-08-26 DIAGNOSIS — E11.22 TYPE 2 DIABETES MELLITUS WITH STAGE 3A CHRONIC KIDNEY DISEASE, WITH LONG-TERM CURRENT USE OF INSULIN: Primary | ICD-10-CM

## 2021-08-26 LAB
ALBUMIN SERPL BCP-MCNC: 3.3 G/DL (ref 3.5–5.2)
ALP SERPL-CCNC: 64 U/L (ref 55–135)
ALT SERPL W/O P-5'-P-CCNC: 11 U/L (ref 10–44)
ANION GAP SERPL CALC-SCNC: 6 MMOL/L (ref 8–16)
AST SERPL-CCNC: 13 U/L (ref 10–40)
BILIRUB SERPL-MCNC: 0.3 MG/DL (ref 0.1–1)
BUN SERPL-MCNC: 14 MG/DL (ref 6–20)
CALCIUM SERPL-MCNC: 8.7 MG/DL (ref 8.7–10.5)
CHLORIDE SERPL-SCNC: 111 MMOL/L (ref 95–110)
CO2 SERPL-SCNC: 24 MMOL/L (ref 23–29)
CREAT SERPL-MCNC: 1.3 MG/DL (ref 0.5–1.4)
EST. GFR  (AFRICAN AMERICAN): 56 ML/MIN/1.73 M^2
EST. GFR  (NON AFRICAN AMERICAN): 48 ML/MIN/1.73 M^2
GLUCOSE SERPL-MCNC: 74 MG/DL (ref 70–110)
POTASSIUM SERPL-SCNC: 4.3 MMOL/L (ref 3.5–5.1)
PROT SERPL-MCNC: 6.9 G/DL (ref 6–8.4)
SODIUM SERPL-SCNC: 141 MMOL/L (ref 136–145)

## 2021-08-26 PROCEDURE — 3008F PR BODY MASS INDEX (BMI) DOCUMENTED: ICD-10-PCS | Mod: CPTII,S$GLB,, | Performed by: FAMILY MEDICINE

## 2021-08-26 PROCEDURE — 99999 PR PBB SHADOW E&M-EST. PATIENT-LVL III: CPT | Mod: PBBFAC,,, | Performed by: FAMILY MEDICINE

## 2021-08-26 PROCEDURE — 99999 PR PBB SHADOW E&M-EST. PATIENT-LVL III: ICD-10-PCS | Mod: PBBFAC,,, | Performed by: FAMILY MEDICINE

## 2021-08-26 PROCEDURE — 3074F SYST BP LT 130 MM HG: CPT | Mod: CPTII,S$GLB,, | Performed by: FAMILY MEDICINE

## 2021-08-26 PROCEDURE — 1160F PR REVIEW ALL MEDS BY PRESCRIBER/CLIN PHARMACIST DOCUMENTED: ICD-10-PCS | Mod: CPTII,S$GLB,, | Performed by: FAMILY MEDICINE

## 2021-08-26 PROCEDURE — 3044F HG A1C LEVEL LT 7.0%: CPT | Mod: CPTII,S$GLB,, | Performed by: FAMILY MEDICINE

## 2021-08-26 PROCEDURE — 3079F PR MOST RECENT DIASTOLIC BLOOD PRESSURE 80-89 MM HG: ICD-10-PCS | Mod: CPTII,S$GLB,, | Performed by: FAMILY MEDICINE

## 2021-08-26 PROCEDURE — 3074F PR MOST RECENT SYSTOLIC BLOOD PRESSURE < 130 MM HG: ICD-10-PCS | Mod: CPTII,S$GLB,, | Performed by: FAMILY MEDICINE

## 2021-08-26 PROCEDURE — 99214 PR OFFICE/OUTPT VISIT, EST, LEVL IV, 30-39 MIN: ICD-10-PCS | Mod: S$GLB,,, | Performed by: FAMILY MEDICINE

## 2021-08-26 PROCEDURE — 3044F PR MOST RECENT HEMOGLOBIN A1C LEVEL <7.0%: ICD-10-PCS | Mod: CPTII,S$GLB,, | Performed by: FAMILY MEDICINE

## 2021-08-26 PROCEDURE — 1159F MED LIST DOCD IN RCRD: CPT | Mod: CPTII,S$GLB,, | Performed by: FAMILY MEDICINE

## 2021-08-26 PROCEDURE — 1125F AMNT PAIN NOTED PAIN PRSNT: CPT | Mod: CPTII,S$GLB,, | Performed by: FAMILY MEDICINE

## 2021-08-26 PROCEDURE — 99214 OFFICE O/P EST MOD 30 MIN: CPT | Mod: S$GLB,,, | Performed by: FAMILY MEDICINE

## 2021-08-26 PROCEDURE — 80053 COMPREHEN METABOLIC PANEL: CPT | Performed by: FAMILY MEDICINE

## 2021-08-26 PROCEDURE — 83036 HEMOGLOBIN GLYCOSYLATED A1C: CPT | Performed by: FAMILY MEDICINE

## 2021-08-26 PROCEDURE — 3008F BODY MASS INDEX DOCD: CPT | Mod: CPTII,S$GLB,, | Performed by: FAMILY MEDICINE

## 2021-08-26 PROCEDURE — 1160F RVW MEDS BY RX/DR IN RCRD: CPT | Mod: CPTII,S$GLB,, | Performed by: FAMILY MEDICINE

## 2021-08-26 PROCEDURE — 1159F PR MEDICATION LIST DOCUMENTED IN MEDICAL RECORD: ICD-10-PCS | Mod: CPTII,S$GLB,, | Performed by: FAMILY MEDICINE

## 2021-08-26 PROCEDURE — 1125F PR PAIN SEVERITY QUANTIFIED, PAIN PRESENT: ICD-10-PCS | Mod: CPTII,S$GLB,, | Performed by: FAMILY MEDICINE

## 2021-08-26 PROCEDURE — 3079F DIAST BP 80-89 MM HG: CPT | Mod: CPTII,S$GLB,, | Performed by: FAMILY MEDICINE

## 2021-08-26 PROCEDURE — 36415 COLL VENOUS BLD VENIPUNCTURE: CPT | Mod: PN | Performed by: FAMILY MEDICINE

## 2021-08-26 RX ORDER — EMPAGLIFLOZIN 10 MG/1
10 TABLET, FILM COATED ORAL DAILY
Qty: 30 TABLET | Refills: 1 | Status: SHIPPED | OUTPATIENT
Start: 2021-08-26 | End: 2021-11-19

## 2021-08-27 LAB
ESTIMATED AVG GLUCOSE: 114 MG/DL (ref 68–131)
HBA1C MFR BLD: 5.6 % (ref 4–5.6)

## 2021-08-28 LAB
DEPRECATED S PNEUM12 IGG SER-MCNC: 0.4 UG/ML
DEPRECATED S PNEUM23 IGG SER-MCNC: 2.4 UG/ML
DEPRECATED S PNEUM4 IGG SER-MCNC: 2.1 UG/ML
DEPRECATED S PNEUM8 IGG SER-MCNC: 2.6 UG/ML
DEPRECATED S PNEUM9 IGG SER-MCNC: 0.9 UG/ML
S PN DA SERO 19F IGG SER-MCNC: 17.5 UG/ML
S PNEUM DA 1 IGG SER-MCNC: 3.7 UG/ML
S PNEUM DA 14 IGG SER-MCNC: 48
S PNEUM DA 18C IGG SER-MCNC: 5.9
S PNEUM DA 3 IGG SER-MCNC: 4.7 UG/ML
S PNEUM DA 5 IGG SER-MCNC: 10 UG/ML
S PNEUM DA 6B IGG SER-MCNC: 7.3 UG/ML
S PNEUM DA 7F IGG SER-MCNC: 2.3 UG/ML
S PNEUM DA 9V IGG SER-MCNC: 2.7 UG/ML

## 2021-09-08 ENCOUNTER — TELEPHONE (OUTPATIENT)
Dept: ALLERGY | Facility: CLINIC | Age: 51
End: 2021-09-08

## 2021-09-13 ENCOUNTER — OFFICE VISIT (OUTPATIENT)
Dept: NEUROSURGERY | Facility: CLINIC | Age: 51
End: 2021-09-13
Payer: MEDICARE

## 2021-09-13 ENCOUNTER — HOSPITAL ENCOUNTER (OUTPATIENT)
Dept: RADIOLOGY | Facility: HOSPITAL | Age: 51
Discharge: HOME | End: 2021-09-13
Attending: ORTHOPAEDIC SURGERY
Payer: MEDICARE

## 2021-09-13 VITALS
DIASTOLIC BLOOD PRESSURE: 97 MMHG | HEART RATE: 78 BPM | BODY MASS INDEX: 58.92 KG/M2 | HEIGHT: 64 IN | SYSTOLIC BLOOD PRESSURE: 142 MMHG

## 2021-09-13 DIAGNOSIS — G89.29 CHRONIC BILATERAL LOW BACK PAIN WITH BILATERAL SCIATICA: Primary | ICD-10-CM

## 2021-09-13 DIAGNOSIS — M51.36 DDD (DEGENERATIVE DISC DISEASE), LUMBAR: ICD-10-CM

## 2021-09-13 DIAGNOSIS — M48.062 SPINAL STENOSIS, LUMBAR REGION WITH NEUROGENIC CLAUDICATION: ICD-10-CM

## 2021-09-13 DIAGNOSIS — M54.16 LUMBAR RADICULOPATHY: ICD-10-CM

## 2021-09-13 DIAGNOSIS — M54.42 CHRONIC BILATERAL LOW BACK PAIN WITH BILATERAL SCIATICA: Primary | ICD-10-CM

## 2021-09-13 DIAGNOSIS — M47.26 OTHER SPONDYLOSIS WITH RADICULOPATHY, LUMBAR REGION: ICD-10-CM

## 2021-09-13 DIAGNOSIS — M54.41 CHRONIC BILATERAL LOW BACK PAIN WITH BILATERAL SCIATICA: Primary | ICD-10-CM

## 2021-09-13 DIAGNOSIS — M43.16 SPONDYLOLISTHESIS, LUMBAR REGION: ICD-10-CM

## 2021-09-13 PROCEDURE — 99999 PR PBB SHADOW E&M-EST. PATIENT-LVL V: CPT | Mod: PBBFAC,HCNC,, | Performed by: PHYSICIAN ASSISTANT

## 2021-09-13 PROCEDURE — 4010F PR ACE/ARB THEARPY RXD/TAKEN: ICD-10-PCS | Mod: HCNC,CPTII,S$GLB, | Performed by: PHYSICIAN ASSISTANT

## 2021-09-13 PROCEDURE — 3077F SYST BP >= 140 MM HG: CPT | Mod: HCNC,CPTII,S$GLB, | Performed by: PHYSICIAN ASSISTANT

## 2021-09-13 PROCEDURE — 3008F BODY MASS INDEX DOCD: CPT | Mod: HCNC,CPTII,S$GLB, | Performed by: PHYSICIAN ASSISTANT

## 2021-09-13 PROCEDURE — 1160F PR REVIEW ALL MEDS BY PRESCRIBER/CLIN PHARMACIST DOCUMENTED: ICD-10-PCS | Mod: HCNC,CPTII,S$GLB, | Performed by: PHYSICIAN ASSISTANT

## 2021-09-13 PROCEDURE — 72110 X-RAY EXAM L-2 SPINE 4/>VWS: CPT | Mod: 26,,, | Performed by: RADIOLOGY

## 2021-09-13 PROCEDURE — 99204 PR OFFICE/OUTPT VISIT, NEW, LEVL IV, 45-59 MIN: ICD-10-PCS | Mod: HCNC,S$GLB,, | Performed by: PHYSICIAN ASSISTANT

## 2021-09-13 PROCEDURE — 1159F PR MEDICATION LIST DOCUMENTED IN MEDICAL RECORD: ICD-10-PCS | Mod: HCNC,CPTII,S$GLB, | Performed by: PHYSICIAN ASSISTANT

## 2021-09-13 PROCEDURE — 99204 OFFICE O/P NEW MOD 45 MIN: CPT | Mod: HCNC,S$GLB,, | Performed by: PHYSICIAN ASSISTANT

## 2021-09-13 PROCEDURE — 99999 PR PBB SHADOW E&M-EST. PATIENT-LVL V: ICD-10-PCS | Mod: PBBFAC,HCNC,, | Performed by: PHYSICIAN ASSISTANT

## 2021-09-13 PROCEDURE — 3080F DIAST BP >= 90 MM HG: CPT | Mod: HCNC,CPTII,S$GLB, | Performed by: PHYSICIAN ASSISTANT

## 2021-09-13 PROCEDURE — 72110 X-RAY EXAM L-2 SPINE 4/>VWS: CPT | Mod: TC,PN

## 2021-09-13 PROCEDURE — 4010F ACE/ARB THERAPY RXD/TAKEN: CPT | Mod: HCNC,CPTII,S$GLB, | Performed by: PHYSICIAN ASSISTANT

## 2021-09-13 PROCEDURE — 3077F PR MOST RECENT SYSTOLIC BLOOD PRESSURE >= 140 MM HG: ICD-10-PCS | Mod: HCNC,CPTII,S$GLB, | Performed by: PHYSICIAN ASSISTANT

## 2021-09-13 PROCEDURE — 3044F HG A1C LEVEL LT 7.0%: CPT | Mod: HCNC,CPTII,S$GLB, | Performed by: PHYSICIAN ASSISTANT

## 2021-09-13 PROCEDURE — 72110 XR LUMBAR SPINE AP AND LAT WITH FLEX/EXT: ICD-10-PCS | Mod: 26,,, | Performed by: RADIOLOGY

## 2021-09-13 PROCEDURE — 1159F MED LIST DOCD IN RCRD: CPT | Mod: HCNC,CPTII,S$GLB, | Performed by: PHYSICIAN ASSISTANT

## 2021-09-13 PROCEDURE — 1160F RVW MEDS BY RX/DR IN RCRD: CPT | Mod: HCNC,CPTII,S$GLB, | Performed by: PHYSICIAN ASSISTANT

## 2021-09-13 PROCEDURE — 3008F PR BODY MASS INDEX (BMI) DOCUMENTED: ICD-10-PCS | Mod: HCNC,CPTII,S$GLB, | Performed by: PHYSICIAN ASSISTANT

## 2021-09-13 PROCEDURE — 3044F PR MOST RECENT HEMOGLOBIN A1C LEVEL <7.0%: ICD-10-PCS | Mod: HCNC,CPTII,S$GLB, | Performed by: PHYSICIAN ASSISTANT

## 2021-09-13 PROCEDURE — 3080F PR MOST RECENT DIASTOLIC BLOOD PRESSURE >= 90 MM HG: ICD-10-PCS | Mod: HCNC,CPTII,S$GLB, | Performed by: PHYSICIAN ASSISTANT

## 2021-09-14 ENCOUNTER — PATIENT MESSAGE (OUTPATIENT)
Dept: FAMILY MEDICINE | Facility: CLINIC | Age: 51
End: 2021-09-14

## 2021-09-14 DIAGNOSIS — R32 URINARY INCONTINENCE, UNSPECIFIED TYPE: Primary | ICD-10-CM

## 2021-09-15 DIAGNOSIS — J45.909 ASTHMA, UNSPECIFIED ASTHMA SEVERITY, UNSPECIFIED WHETHER COMPLICATED, UNSPECIFIED WHETHER PERSISTENT: ICD-10-CM

## 2021-09-15 DIAGNOSIS — F33.3 SEVERE EPISODE OF RECURRENT MAJOR DEPRESSIVE DISORDER, WITH PSYCHOTIC FEATURES: ICD-10-CM

## 2021-09-15 RX ORDER — QUETIAPINE FUMARATE 25 MG/1
25 TABLET, FILM COATED ORAL DAILY
Qty: 90 TABLET | Refills: 0 | Status: SHIPPED | OUTPATIENT
Start: 2021-09-15 | End: 2021-12-13 | Stop reason: SDUPTHER

## 2021-09-16 RX ORDER — ALBUTEROL SULFATE 90 UG/1
2 AEROSOL, METERED RESPIRATORY (INHALATION) EVERY 6 HOURS PRN
Qty: 54 G | Refills: 6 | Status: SHIPPED | OUTPATIENT
Start: 2021-09-16 | End: 2022-11-02 | Stop reason: SDUPTHER

## 2021-09-17 ENCOUNTER — PATIENT MESSAGE (OUTPATIENT)
Dept: PAIN MEDICINE | Facility: CLINIC | Age: 51
End: 2021-09-17

## 2021-09-18 ENCOUNTER — LAB VISIT (OUTPATIENT)
Dept: PRIMARY CARE CLINIC | Facility: OTHER | Age: 51
End: 2021-09-18
Attending: INTERNAL MEDICINE
Payer: MEDICARE

## 2021-09-18 DIAGNOSIS — R05.9 COUGH: ICD-10-CM

## 2021-09-18 PROCEDURE — U0003 INFECTIOUS AGENT DETECTION BY NUCLEIC ACID (DNA OR RNA); SEVERE ACUTE RESPIRATORY SYNDROME CORONAVIRUS 2 (SARS-COV-2) (CORONAVIRUS DISEASE [COVID-19]), AMPLIFIED PROBE TECHNIQUE, MAKING USE OF HIGH THROUGHPUT TECHNOLOGIES AS DESCRIBED BY CMS-2020-01-R: HCPCS | Performed by: INTERNAL MEDICINE

## 2021-09-20 ENCOUNTER — PATIENT MESSAGE (OUTPATIENT)
Dept: FAMILY MEDICINE | Facility: CLINIC | Age: 51
End: 2021-09-20

## 2021-09-20 ENCOUNTER — TELEPHONE (OUTPATIENT)
Dept: ORTHOPEDICS | Facility: CLINIC | Age: 51
End: 2021-09-20

## 2021-09-20 ENCOUNTER — INFUSION (OUTPATIENT)
Dept: INFECTIOUS DISEASES | Facility: HOSPITAL | Age: 51
End: 2021-09-20
Attending: EMERGENCY MEDICINE
Payer: MEDICARE

## 2021-09-20 ENCOUNTER — TELEPHONE (OUTPATIENT)
Dept: PHYSICAL MEDICINE AND REHAB | Facility: CLINIC | Age: 51
End: 2021-09-20

## 2021-09-20 VITALS
WEIGHT: 293 LBS | OXYGEN SATURATION: 100 % | TEMPERATURE: 98 F | HEART RATE: 70 BPM | DIASTOLIC BLOOD PRESSURE: 77 MMHG | HEIGHT: 64 IN | SYSTOLIC BLOOD PRESSURE: 180 MMHG | RESPIRATION RATE: 16 BRPM | BODY MASS INDEX: 50.02 KG/M2

## 2021-09-20 DIAGNOSIS — U07.1 COVID-19 VIRUS DETECTED: ICD-10-CM

## 2021-09-20 DIAGNOSIS — U07.1 COVID-19: ICD-10-CM

## 2021-09-20 DIAGNOSIS — M51.369 DDD (DEGENERATIVE DISC DISEASE), LUMBAR: Primary | ICD-10-CM

## 2021-09-20 DIAGNOSIS — U07.1 COVID-19: Primary | ICD-10-CM

## 2021-09-20 LAB
SARS-COV-2 RNA RESP QL NAA+PROBE: DETECTED
SARS-COV-2- CYCLE NUMBER: 16

## 2021-09-20 PROCEDURE — 25000003 PHARM REV CODE 250: Mod: HCNC | Performed by: EMERGENCY MEDICINE

## 2021-09-20 PROCEDURE — 63600175 PHARM REV CODE 636 W HCPCS: Mod: HCNC | Performed by: EMERGENCY MEDICINE

## 2021-09-20 PROCEDURE — M0243 CASIRIVI AND IMDEVI INFUSION: HCPCS | Performed by: EMERGENCY MEDICINE

## 2021-09-20 RX ORDER — ACETAMINOPHEN 325 MG/1
650 TABLET ORAL ONCE AS NEEDED
Status: DISCONTINUED | OUTPATIENT
Start: 2021-09-20 | End: 2022-04-13

## 2021-09-20 RX ORDER — DIPHENHYDRAMINE HYDROCHLORIDE 50 MG/ML
25 INJECTION INTRAMUSCULAR; INTRAVENOUS ONCE AS NEEDED
Status: DISCONTINUED | OUTPATIENT
Start: 2021-09-20 | End: 2023-01-21 | Stop reason: HOSPADM

## 2021-09-20 RX ORDER — EPINEPHRINE 0.3 MG/.3ML
0.3 INJECTION SUBCUTANEOUS
Status: DISCONTINUED | OUTPATIENT
Start: 2021-09-20 | End: 2023-05-01

## 2021-09-20 RX ORDER — ALBUTEROL SULFATE 90 UG/1
2 AEROSOL, METERED RESPIRATORY (INHALATION)
Status: DISCONTINUED | OUTPATIENT
Start: 2021-09-20 | End: 2023-05-01

## 2021-09-20 RX ORDER — SODIUM CHLORIDE 0.9 % (FLUSH) 0.9 %
10 SYRINGE (ML) INJECTION
Status: DISCONTINUED | OUTPATIENT
Start: 2021-09-20 | End: 2023-01-21 | Stop reason: HOSPADM

## 2021-09-20 RX ORDER — ONDANSETRON 4 MG/1
4 TABLET, ORALLY DISINTEGRATING ORAL ONCE AS NEEDED
Status: DISCONTINUED | OUTPATIENT
Start: 2021-09-20 | End: 2023-01-21 | Stop reason: HOSPADM

## 2021-09-20 RX ADMIN — CASIRIVIMAB AND IMDEVIMAB 600 MG: 600; 600 INJECTION, SOLUTION, CONCENTRATE INTRAVENOUS at 02:09

## 2021-09-21 ENCOUNTER — NURSE TRIAGE (OUTPATIENT)
Dept: ADMINISTRATIVE | Facility: CLINIC | Age: 51
End: 2021-09-21

## 2021-09-22 ENCOUNTER — PATIENT MESSAGE (OUTPATIENT)
Dept: FAMILY MEDICINE | Facility: CLINIC | Age: 51
End: 2021-09-22

## 2021-09-22 ENCOUNTER — PATIENT OUTREACH (OUTPATIENT)
Dept: ADMINISTRATIVE | Facility: OTHER | Age: 51
End: 2021-09-22

## 2021-09-23 ENCOUNTER — OFFICE VISIT (OUTPATIENT)
Dept: PHYSICAL MEDICINE AND REHAB | Facility: CLINIC | Age: 51
End: 2021-09-23
Payer: MEDICAID

## 2021-09-23 ENCOUNTER — TELEPHONE (OUTPATIENT)
Dept: PHYSICAL MEDICINE AND REHAB | Facility: CLINIC | Age: 51
End: 2021-09-23

## 2021-09-23 DIAGNOSIS — M54.41 CHRONIC MIDLINE LOW BACK PAIN WITH BILATERAL SCIATICA: ICD-10-CM

## 2021-09-23 DIAGNOSIS — M48.061 NEURAL FORAMINAL STENOSIS OF LUMBAR SPINE: ICD-10-CM

## 2021-09-23 DIAGNOSIS — M48.061 SPINAL STENOSIS OF LUMBAR REGION, UNSPECIFIED WHETHER NEUROGENIC CLAUDICATION PRESENT: ICD-10-CM

## 2021-09-23 DIAGNOSIS — M79.7 FIBROMYALGIA SYNDROME: Primary | ICD-10-CM

## 2021-09-23 DIAGNOSIS — G89.29 CHRONIC MIDLINE LOW BACK PAIN WITH BILATERAL SCIATICA: ICD-10-CM

## 2021-09-23 DIAGNOSIS — Z79.891 CHRONICALLY ON OPIATE THERAPY: ICD-10-CM

## 2021-09-23 DIAGNOSIS — M54.42 CHRONIC MIDLINE LOW BACK PAIN WITH BILATERAL SCIATICA: ICD-10-CM

## 2021-09-23 DIAGNOSIS — E66.01 MORBID OBESITY WITH BMI OF 50.0-59.9, ADULT: ICD-10-CM

## 2021-09-23 DIAGNOSIS — M47.26 OSTEOARTHRITIS OF SPINE WITH RADICULOPATHY, LUMBAR REGION: ICD-10-CM

## 2021-09-23 PROCEDURE — 99214 PR OFFICE/OUTPT VISIT, EST, LEVL IV, 30-39 MIN: ICD-10-PCS | Mod: 95,,, | Performed by: PHYSICAL MEDICINE & REHABILITATION

## 2021-09-23 PROCEDURE — 99214 OFFICE O/P EST MOD 30 MIN: CPT | Mod: 95,,, | Performed by: PHYSICAL MEDICINE & REHABILITATION

## 2021-09-23 RX ORDER — CYCLOBENZAPRINE HCL 10 MG
10 TABLET ORAL 3 TIMES DAILY PRN
Qty: 90 TABLET | Refills: 3
Start: 2021-09-23 | End: 2021-10-23

## 2021-09-23 RX ORDER — PREGABALIN 50 MG/1
50 CAPSULE ORAL 3 TIMES DAILY
Qty: 90 CAPSULE | Refills: 3 | Status: SHIPPED | OUTPATIENT
Start: 2021-09-23 | End: 2022-06-16 | Stop reason: SDUPTHER

## 2021-09-27 ENCOUNTER — PATIENT MESSAGE (OUTPATIENT)
Dept: OTOLARYNGOLOGY | Facility: CLINIC | Age: 51
End: 2021-09-27

## 2021-09-27 ENCOUNTER — TELEPHONE (OUTPATIENT)
Dept: OTOLARYNGOLOGY | Facility: CLINIC | Age: 51
End: 2021-09-27

## 2021-09-27 DIAGNOSIS — I10 BENIGN ESSENTIAL HTN: ICD-10-CM

## 2021-09-27 DIAGNOSIS — E11.22 TYPE 2 DIABETES MELLITUS WITH STAGE 3A CHRONIC KIDNEY DISEASE, WITH LONG-TERM CURRENT USE OF INSULIN: ICD-10-CM

## 2021-09-27 DIAGNOSIS — N18.31 TYPE 2 DIABETES MELLITUS WITH STAGE 3A CHRONIC KIDNEY DISEASE, WITH LONG-TERM CURRENT USE OF INSULIN: ICD-10-CM

## 2021-09-27 DIAGNOSIS — Z79.4 TYPE 2 DIABETES MELLITUS WITH STAGE 3A CHRONIC KIDNEY DISEASE, WITH LONG-TERM CURRENT USE OF INSULIN: ICD-10-CM

## 2021-09-27 RX ORDER — LIDOCAINE HYDROCHLORIDE 20 MG/ML
JELLY TOPICAL
Qty: 30 ML | Refills: 2 | Status: SHIPPED | OUTPATIENT
Start: 2021-09-27 | End: 2022-05-26 | Stop reason: SDUPTHER

## 2021-09-27 RX ORDER — LOSARTAN POTASSIUM 100 MG/1
100 TABLET ORAL DAILY
Qty: 90 TABLET | Refills: 0 | Status: SHIPPED | OUTPATIENT
Start: 2021-09-27 | End: 2021-12-28 | Stop reason: SDUPTHER

## 2021-09-28 ENCOUNTER — LAB VISIT (OUTPATIENT)
Dept: PRIMARY CARE CLINIC | Facility: OTHER | Age: 51
End: 2021-09-28
Attending: INTERNAL MEDICINE
Payer: MEDICARE

## 2021-09-28 DIAGNOSIS — J30.9 ALLERGIC RHINITIS, UNSPECIFIED SEASONALITY, UNSPECIFIED TRIGGER: ICD-10-CM

## 2021-09-28 DIAGNOSIS — J32.9 CHRONIC RECURRENT SINUSITIS: Primary | ICD-10-CM

## 2021-09-28 DIAGNOSIS — Z20.822 ENCOUNTER FOR LABORATORY TESTING FOR COVID-19 VIRUS: ICD-10-CM

## 2021-09-28 DIAGNOSIS — J32.9 RECURRENT SINUSITIS: ICD-10-CM

## 2021-09-28 DIAGNOSIS — Z01.818 PRE-OP TESTING: ICD-10-CM

## 2021-09-28 PROCEDURE — U0003 INFECTIOUS AGENT DETECTION BY NUCLEIC ACID (DNA OR RNA); SEVERE ACUTE RESPIRATORY SYNDROME CORONAVIRUS 2 (SARS-COV-2) (CORONAVIRUS DISEASE [COVID-19]), AMPLIFIED PROBE TECHNIQUE, MAKING USE OF HIGH THROUGHPUT TECHNOLOGIES AS DESCRIBED BY CMS-2020-01-R: HCPCS | Performed by: INTERNAL MEDICINE

## 2021-09-28 RX ORDER — SEMAGLUTIDE 1.34 MG/ML
1 INJECTION, SOLUTION SUBCUTANEOUS
Qty: 3 PEN | Refills: 0 | Status: SHIPPED | OUTPATIENT
Start: 2021-09-28 | End: 2021-12-28 | Stop reason: SDUPTHER

## 2021-09-29 LAB
SARS-COV-2 RNA RESP QL NAA+PROBE: DETECTED
SARS-COV-2- CYCLE NUMBER: 39

## 2021-10-04 ENCOUNTER — PATIENT MESSAGE (OUTPATIENT)
Dept: ADMINISTRATIVE | Facility: HOSPITAL | Age: 51
End: 2021-10-04

## 2021-10-13 ENCOUNTER — HOSPITAL ENCOUNTER (OUTPATIENT)
Dept: RADIOLOGY | Facility: HOSPITAL | Age: 51
Discharge: HOME | End: 2021-10-13
Attending: ORTHOPAEDIC SURGERY
Payer: MEDICARE

## 2021-10-13 ENCOUNTER — OFFICE VISIT (OUTPATIENT)
Dept: ORTHOPEDICS | Facility: CLINIC | Age: 51
End: 2021-10-13
Payer: MEDICARE

## 2021-10-13 ENCOUNTER — PATIENT MESSAGE (OUTPATIENT)
Dept: PODIATRY | Facility: CLINIC | Age: 51
End: 2021-10-13
Payer: MEDICARE

## 2021-10-13 DIAGNOSIS — M51.36 DDD (DEGENERATIVE DISC DISEASE), LUMBAR: ICD-10-CM

## 2021-10-13 DIAGNOSIS — M54.16 LUMBAR RADICULOPATHY: Primary | ICD-10-CM

## 2021-10-13 PROCEDURE — 4010F PR ACE/ARB THEARPY RXD/TAKEN: ICD-10-PCS | Mod: HCNC,CPTII,S$GLB, | Performed by: ORTHOPAEDIC SURGERY

## 2021-10-13 PROCEDURE — 72110 X-RAY EXAM L-2 SPINE 4/>VWS: CPT | Mod: TC,HCNC

## 2021-10-13 PROCEDURE — 72110 X-RAY EXAM L-2 SPINE 4/>VWS: CPT | Mod: 26,HCNC,, | Performed by: RADIOLOGY

## 2021-10-13 PROCEDURE — 3044F HG A1C LEVEL LT 7.0%: CPT | Mod: HCNC,CPTII,S$GLB, | Performed by: ORTHOPAEDIC SURGERY

## 2021-10-13 PROCEDURE — 99214 OFFICE O/P EST MOD 30 MIN: CPT | Mod: HCNC,S$GLB,, | Performed by: ORTHOPAEDIC SURGERY

## 2021-10-13 PROCEDURE — 99999 PR PBB SHADOW E&M-EST. PATIENT-LVL III: CPT | Mod: PBBFAC,HCNC,, | Performed by: ORTHOPAEDIC SURGERY

## 2021-10-13 PROCEDURE — 99214 PR OFFICE/OUTPT VISIT, EST, LEVL IV, 30-39 MIN: ICD-10-PCS | Mod: HCNC,S$GLB,, | Performed by: ORTHOPAEDIC SURGERY

## 2021-10-13 PROCEDURE — 4010F ACE/ARB THERAPY RXD/TAKEN: CPT | Mod: HCNC,CPTII,S$GLB, | Performed by: ORTHOPAEDIC SURGERY

## 2021-10-13 PROCEDURE — 99999 PR PBB SHADOW E&M-EST. PATIENT-LVL III: ICD-10-PCS | Mod: PBBFAC,HCNC,, | Performed by: ORTHOPAEDIC SURGERY

## 2021-10-13 PROCEDURE — 72110 XR LUMBAR SPINE AP AND LAT WITH FLEX/EXT: ICD-10-PCS | Mod: 26,HCNC,, | Performed by: RADIOLOGY

## 2021-10-13 PROCEDURE — 3044F PR MOST RECENT HEMOGLOBIN A1C LEVEL <7.0%: ICD-10-PCS | Mod: HCNC,CPTII,S$GLB, | Performed by: ORTHOPAEDIC SURGERY

## 2021-10-14 ENCOUNTER — OFFICE VISIT (OUTPATIENT)
Dept: PODIATRY | Facility: CLINIC | Age: 51
End: 2021-10-14
Payer: MEDICARE

## 2021-10-14 VITALS
HEART RATE: 76 BPM | BODY MASS INDEX: 58.88 KG/M2 | DIASTOLIC BLOOD PRESSURE: 91 MMHG | WEIGHT: 293 LBS | SYSTOLIC BLOOD PRESSURE: 134 MMHG

## 2021-10-14 DIAGNOSIS — E11.22 TYPE 2 DIABETES MELLITUS WITH STAGE 3A CHRONIC KIDNEY DISEASE, WITH LONG-TERM CURRENT USE OF INSULIN: Primary | ICD-10-CM

## 2021-10-14 DIAGNOSIS — G89.4 CHRONIC PAIN SYNDROME: ICD-10-CM

## 2021-10-14 DIAGNOSIS — Z79.4 TYPE 2 DIABETES MELLITUS WITH STAGE 3A CHRONIC KIDNEY DISEASE, WITH LONG-TERM CURRENT USE OF INSULIN: Primary | ICD-10-CM

## 2021-10-14 DIAGNOSIS — M72.2 PLANTAR FASCIAL FIBROMATOSIS OF LEFT FOOT: ICD-10-CM

## 2021-10-14 DIAGNOSIS — N18.31 TYPE 2 DIABETES MELLITUS WITH STAGE 3A CHRONIC KIDNEY DISEASE, WITH LONG-TERM CURRENT USE OF INSULIN: Primary | ICD-10-CM

## 2021-10-14 PROCEDURE — 3044F PR MOST RECENT HEMOGLOBIN A1C LEVEL <7.0%: ICD-10-PCS | Mod: HCNC,CPTII,S$GLB, | Performed by: PODIATRIST

## 2021-10-14 PROCEDURE — 3080F DIAST BP >= 90 MM HG: CPT | Mod: HCNC,CPTII,S$GLB, | Performed by: PODIATRIST

## 2021-10-14 PROCEDURE — 99214 PR OFFICE/OUTPT VISIT, EST, LEVL IV, 30-39 MIN: ICD-10-PCS | Mod: 25,HCNC,S$GLB, | Performed by: PODIATRIST

## 2021-10-14 PROCEDURE — 3044F HG A1C LEVEL LT 7.0%: CPT | Mod: HCNC,CPTII,S$GLB, | Performed by: PODIATRIST

## 2021-10-14 PROCEDURE — 20550 PR INJECT TENDON SHEATH/LIGAMENT: ICD-10-PCS | Mod: HCNC,LT,S$GLB, | Performed by: PODIATRIST

## 2021-10-14 PROCEDURE — 3008F BODY MASS INDEX DOCD: CPT | Mod: HCNC,CPTII,S$GLB, | Performed by: PODIATRIST

## 2021-10-14 PROCEDURE — 99214 OFFICE O/P EST MOD 30 MIN: CPT | Mod: 25,HCNC,S$GLB, | Performed by: PODIATRIST

## 2021-10-14 PROCEDURE — 3008F PR BODY MASS INDEX (BMI) DOCUMENTED: ICD-10-PCS | Mod: HCNC,CPTII,S$GLB, | Performed by: PODIATRIST

## 2021-10-14 PROCEDURE — 99999 PR PBB SHADOW E&M-EST. PATIENT-LVL IV: ICD-10-PCS | Mod: PBBFAC,HCNC,, | Performed by: PODIATRIST

## 2021-10-14 PROCEDURE — 3075F PR MOST RECENT SYSTOLIC BLOOD PRESS GE 130-139MM HG: ICD-10-PCS | Mod: HCNC,CPTII,S$GLB, | Performed by: PODIATRIST

## 2021-10-14 PROCEDURE — 4010F PR ACE/ARB THEARPY RXD/TAKEN: ICD-10-PCS | Mod: HCNC,CPTII,S$GLB, | Performed by: PODIATRIST

## 2021-10-14 PROCEDURE — 4010F ACE/ARB THERAPY RXD/TAKEN: CPT | Mod: HCNC,CPTII,S$GLB, | Performed by: PODIATRIST

## 2021-10-14 PROCEDURE — 3080F PR MOST RECENT DIASTOLIC BLOOD PRESSURE >= 90 MM HG: ICD-10-PCS | Mod: HCNC,CPTII,S$GLB, | Performed by: PODIATRIST

## 2021-10-14 PROCEDURE — 3075F SYST BP GE 130 - 139MM HG: CPT | Mod: HCNC,CPTII,S$GLB, | Performed by: PODIATRIST

## 2021-10-14 PROCEDURE — 20550 NJX 1 TENDON SHEATH/LIGAMENT: CPT | Mod: HCNC,LT,S$GLB, | Performed by: PODIATRIST

## 2021-10-14 PROCEDURE — 99999 PR PBB SHADOW E&M-EST. PATIENT-LVL IV: CPT | Mod: PBBFAC,HCNC,, | Performed by: PODIATRIST

## 2021-10-14 RX ORDER — DICLOFENAC SODIUM 10 MG/G
2 GEL TOPICAL 4 TIMES DAILY
Qty: 1 TUBE | Refills: 2 | Status: SHIPPED | OUTPATIENT
Start: 2021-10-14 | End: 2022-03-08 | Stop reason: SDUPTHER

## 2021-10-18 ENCOUNTER — OFFICE VISIT (OUTPATIENT)
Dept: FAMILY MEDICINE | Facility: CLINIC | Age: 51
End: 2021-10-18
Payer: MEDICARE

## 2021-10-18 VITALS
BODY MASS INDEX: 50.02 KG/M2 | WEIGHT: 293 LBS | HEART RATE: 63 BPM | OXYGEN SATURATION: 99 % | HEIGHT: 64 IN | SYSTOLIC BLOOD PRESSURE: 120 MMHG | DIASTOLIC BLOOD PRESSURE: 80 MMHG | RESPIRATION RATE: 16 BRPM

## 2021-10-18 DIAGNOSIS — N18.31 STAGE 3A CHRONIC KIDNEY DISEASE: Chronic | ICD-10-CM

## 2021-10-18 DIAGNOSIS — E11.22 TYPE 2 DIABETES MELLITUS WITH STAGE 3A CHRONIC KIDNEY DISEASE, WITH LONG-TERM CURRENT USE OF INSULIN: ICD-10-CM

## 2021-10-18 DIAGNOSIS — N18.31 TYPE 2 DIABETES MELLITUS WITH STAGE 3A CHRONIC KIDNEY DISEASE, WITH LONG-TERM CURRENT USE OF INSULIN: ICD-10-CM

## 2021-10-18 DIAGNOSIS — Z79.4 TYPE 2 DIABETES MELLITUS WITH STAGE 3A CHRONIC KIDNEY DISEASE, WITH LONG-TERM CURRENT USE OF INSULIN: ICD-10-CM

## 2021-10-18 DIAGNOSIS — E66.01 MORBID OBESITY WITH BMI OF 50.0-59.9, ADULT: ICD-10-CM

## 2021-10-18 DIAGNOSIS — Z01.818 PREOP EXAMINATION: Primary | ICD-10-CM

## 2021-10-18 PROCEDURE — 99999 PR PBB SHADOW E&M-EST. PATIENT-LVL III: CPT | Mod: PBBFAC,HCNC,, | Performed by: FAMILY MEDICINE

## 2021-10-18 PROCEDURE — 3079F DIAST BP 80-89 MM HG: CPT | Mod: HCNC,CPTII,S$GLB, | Performed by: FAMILY MEDICINE

## 2021-10-18 PROCEDURE — G0008 FLU VACCINE (QUAD) GREATER THAN OR EQUAL TO 3YO PRESERVATIVE FREE IM: ICD-10-PCS | Mod: HCNC,S$GLB,, | Performed by: FAMILY MEDICINE

## 2021-10-18 PROCEDURE — 90686 IIV4 VACC NO PRSV 0.5 ML IM: CPT | Mod: HCNC,S$GLB,, | Performed by: FAMILY MEDICINE

## 2021-10-18 PROCEDURE — 99999 PR PBB SHADOW E&M-EST. PATIENT-LVL III: ICD-10-PCS | Mod: PBBFAC,HCNC,, | Performed by: FAMILY MEDICINE

## 2021-10-18 PROCEDURE — 3044F PR MOST RECENT HEMOGLOBIN A1C LEVEL <7.0%: ICD-10-PCS | Mod: HCNC,CPTII,S$GLB, | Performed by: FAMILY MEDICINE

## 2021-10-18 PROCEDURE — 1159F PR MEDICATION LIST DOCUMENTED IN MEDICAL RECORD: ICD-10-PCS | Mod: HCNC,CPTII,S$GLB, | Performed by: FAMILY MEDICINE

## 2021-10-18 PROCEDURE — 3044F HG A1C LEVEL LT 7.0%: CPT | Mod: HCNC,CPTII,S$GLB, | Performed by: FAMILY MEDICINE

## 2021-10-18 PROCEDURE — 4010F ACE/ARB THERAPY RXD/TAKEN: CPT | Mod: HCNC,CPTII,S$GLB, | Performed by: FAMILY MEDICINE

## 2021-10-18 PROCEDURE — 1159F MED LIST DOCD IN RCRD: CPT | Mod: HCNC,CPTII,S$GLB, | Performed by: FAMILY MEDICINE

## 2021-10-18 PROCEDURE — 3079F PR MOST RECENT DIASTOLIC BLOOD PRESSURE 80-89 MM HG: ICD-10-PCS | Mod: HCNC,CPTII,S$GLB, | Performed by: FAMILY MEDICINE

## 2021-10-18 PROCEDURE — 1160F PR REVIEW ALL MEDS BY PRESCRIBER/CLIN PHARMACIST DOCUMENTED: ICD-10-PCS | Mod: HCNC,CPTII,S$GLB, | Performed by: FAMILY MEDICINE

## 2021-10-18 PROCEDURE — 90686 FLU VACCINE (QUAD) GREATER THAN OR EQUAL TO 3YO PRESERVATIVE FREE IM: ICD-10-PCS | Mod: HCNC,S$GLB,, | Performed by: FAMILY MEDICINE

## 2021-10-18 PROCEDURE — 99214 OFFICE O/P EST MOD 30 MIN: CPT | Mod: 25,HCNC,S$GLB, | Performed by: FAMILY MEDICINE

## 2021-10-18 PROCEDURE — 99214 PR OFFICE/OUTPT VISIT, EST, LEVL IV, 30-39 MIN: ICD-10-PCS | Mod: 25,HCNC,S$GLB, | Performed by: FAMILY MEDICINE

## 2021-10-18 PROCEDURE — 3008F PR BODY MASS INDEX (BMI) DOCUMENTED: ICD-10-PCS | Mod: HCNC,CPTII,S$GLB, | Performed by: FAMILY MEDICINE

## 2021-10-18 PROCEDURE — 3074F PR MOST RECENT SYSTOLIC BLOOD PRESSURE < 130 MM HG: ICD-10-PCS | Mod: HCNC,CPTII,S$GLB, | Performed by: FAMILY MEDICINE

## 2021-10-18 PROCEDURE — 3008F BODY MASS INDEX DOCD: CPT | Mod: HCNC,CPTII,S$GLB, | Performed by: FAMILY MEDICINE

## 2021-10-18 PROCEDURE — 1160F RVW MEDS BY RX/DR IN RCRD: CPT | Mod: HCNC,CPTII,S$GLB, | Performed by: FAMILY MEDICINE

## 2021-10-18 PROCEDURE — G0008 ADMIN INFLUENZA VIRUS VAC: HCPCS | Mod: HCNC,S$GLB,, | Performed by: FAMILY MEDICINE

## 2021-10-18 PROCEDURE — 3074F SYST BP LT 130 MM HG: CPT | Mod: HCNC,CPTII,S$GLB, | Performed by: FAMILY MEDICINE

## 2021-10-18 PROCEDURE — 4010F PR ACE/ARB THEARPY RXD/TAKEN: ICD-10-PCS | Mod: HCNC,CPTII,S$GLB, | Performed by: FAMILY MEDICINE

## 2021-10-19 ENCOUNTER — ANESTHESIA EVENT (OUTPATIENT)
Dept: SURGERY | Facility: OTHER | Age: 51
End: 2021-10-19
Payer: MEDICARE

## 2021-10-19 ENCOUNTER — HOSPITAL ENCOUNTER (OUTPATIENT)
Dept: PREADMISSION TESTING | Facility: OTHER | Age: 51
Discharge: HOME | End: 2021-10-19
Attending: OTOLARYNGOLOGY
Payer: MEDICARE

## 2021-10-19 VITALS
OXYGEN SATURATION: 100 % | HEIGHT: 64 IN | RESPIRATION RATE: 18 BRPM | WEIGHT: 293 LBS | HEART RATE: 71 BPM | SYSTOLIC BLOOD PRESSURE: 196 MMHG | DIASTOLIC BLOOD PRESSURE: 121 MMHG | BODY MASS INDEX: 50.02 KG/M2 | TEMPERATURE: 98 F

## 2021-10-19 DIAGNOSIS — Z01.818 PRE-OP TESTING: ICD-10-CM

## 2021-10-19 DIAGNOSIS — Z01.818 PREOP EXAMINATION: ICD-10-CM

## 2021-10-19 LAB
ANION GAP SERPL CALC-SCNC: 8 MMOL/L (ref 8–16)
BASOPHILS # BLD AUTO: 0.04 K/UL (ref 0–0.2)
BASOPHILS NFR BLD: 0.9 % (ref 0–1.9)
BUN SERPL-MCNC: 16 MG/DL (ref 6–20)
CALCIUM SERPL-MCNC: 8.6 MG/DL (ref 8.7–10.5)
CHLORIDE SERPL-SCNC: 109 MMOL/L (ref 95–110)
CO2 SERPL-SCNC: 25 MMOL/L (ref 23–29)
CREAT SERPL-MCNC: 1.1 MG/DL (ref 0.5–1.4)
DIFFERENTIAL METHOD: ABNORMAL
EOSINOPHIL # BLD AUTO: 0.3 K/UL (ref 0–0.5)
EOSINOPHIL NFR BLD: 5.9 % (ref 0–8)
ERYTHROCYTE [DISTWIDTH] IN BLOOD BY AUTOMATED COUNT: 13.4 % (ref 11.5–14.5)
EST. GFR  (AFRICAN AMERICAN): >60 ML/MIN/1.73 M^2
EST. GFR  (NON AFRICAN AMERICAN): 59 ML/MIN/1.73 M^2
GLUCOSE SERPL-MCNC: 83 MG/DL (ref 70–110)
HCT VFR BLD AUTO: 38.3 % (ref 37–48.5)
HGB BLD-MCNC: 12.2 G/DL (ref 12–16)
IMM GRANULOCYTES # BLD AUTO: 0.02 K/UL (ref 0–0.04)
IMM GRANULOCYTES NFR BLD AUTO: 0.4 % (ref 0–0.5)
LYMPHOCYTES # BLD AUTO: 1.6 K/UL (ref 1–4.8)
LYMPHOCYTES NFR BLD: 34.6 % (ref 18–48)
MCH RBC QN AUTO: 30.7 PG (ref 27–31)
MCHC RBC AUTO-ENTMCNC: 31.9 G/DL (ref 32–36)
MCV RBC AUTO: 97 FL (ref 82–98)
MONOCYTES # BLD AUTO: 0.5 K/UL (ref 0.3–1)
MONOCYTES NFR BLD: 10.9 % (ref 4–15)
NEUTROPHILS # BLD AUTO: 2.2 K/UL (ref 1.8–7.7)
NEUTROPHILS NFR BLD: 47.3 % (ref 38–73)
NRBC BLD-RTO: 0 /100 WBC
PLATELET # BLD AUTO: 264 K/UL (ref 150–450)
PLATELET FUNCTION ASSAY - EPINEPHRINE: 144 SECS (ref 76–199)
PMV BLD AUTO: 8.8 FL (ref 9.2–12.9)
POTASSIUM SERPL-SCNC: 4 MMOL/L (ref 3.5–5.1)
RBC # BLD AUTO: 3.97 M/UL (ref 4–5.4)
SODIUM SERPL-SCNC: 142 MMOL/L (ref 136–145)
WBC # BLD AUTO: 4.6 K/UL (ref 3.9–12.7)

## 2021-10-19 PROCEDURE — 93010 EKG 12-LEAD: ICD-10-PCS | Mod: HCNC,,, | Performed by: INTERNAL MEDICINE

## 2021-10-19 PROCEDURE — 93005 ELECTROCARDIOGRAM TRACING: CPT | Mod: HCNC

## 2021-10-19 PROCEDURE — 80048 BASIC METABOLIC PNL TOTAL CA: CPT | Mod: HCNC | Performed by: OTOLARYNGOLOGY

## 2021-10-19 PROCEDURE — 85576 BLOOD PLATELET AGGREGATION: CPT | Mod: HCNC | Performed by: OTOLARYNGOLOGY

## 2021-10-19 PROCEDURE — 85025 COMPLETE CBC W/AUTO DIFF WBC: CPT | Mod: HCNC | Performed by: OTOLARYNGOLOGY

## 2021-10-19 PROCEDURE — 93010 ELECTROCARDIOGRAM REPORT: CPT | Mod: HCNC,,, | Performed by: INTERNAL MEDICINE

## 2021-10-19 PROCEDURE — 36415 COLL VENOUS BLD VENIPUNCTURE: CPT | Mod: HCNC | Performed by: OTOLARYNGOLOGY

## 2021-10-19 RX ORDER — ACETAMINOPHEN 500 MG
1000 TABLET ORAL
Status: CANCELLED | OUTPATIENT
Start: 2021-10-19 | End: 2021-10-19

## 2021-10-19 RX ORDER — ALBUTEROL SULFATE 2.5 MG/.5ML
2.5 SOLUTION RESPIRATORY (INHALATION)
Status: CANCELLED | OUTPATIENT
Start: 2021-10-19 | End: 2021-10-19

## 2021-10-19 RX ORDER — SODIUM CHLORIDE, SODIUM LACTATE, POTASSIUM CHLORIDE, CALCIUM CHLORIDE 600; 310; 30; 20 MG/100ML; MG/100ML; MG/100ML; MG/100ML
INJECTION, SOLUTION INTRAVENOUS CONTINUOUS
Status: CANCELLED | OUTPATIENT
Start: 2021-10-19

## 2021-10-19 RX ORDER — LIDOCAINE HYDROCHLORIDE 10 MG/ML
0.5 INJECTION, SOLUTION EPIDURAL; INFILTRATION; INTRACAUDAL; PERINEURAL ONCE
Status: CANCELLED | OUTPATIENT
Start: 2021-10-19 | End: 2021-10-19

## 2021-10-19 RX ORDER — TRIAMCINOLONE ACETONIDE 40 MG/ML
40 INJECTION, SUSPENSION INTRA-ARTICULAR; INTRAMUSCULAR
Status: COMPLETED | OUTPATIENT
Start: 2021-10-19 | End: 2021-10-19

## 2021-10-19 RX ADMIN — TRIAMCINOLONE ACETONIDE 40 MG: 40 INJECTION, SUSPENSION INTRA-ARTICULAR; INTRAMUSCULAR at 04:10

## 2021-10-20 ENCOUNTER — TELEPHONE (OUTPATIENT)
Dept: OTOLARYNGOLOGY | Facility: CLINIC | Age: 51
End: 2021-10-20

## 2021-10-22 ENCOUNTER — HOSPITAL ENCOUNTER (OUTPATIENT)
Facility: OTHER | Age: 51
Discharge: HOME | End: 2021-10-23
Attending: OTOLARYNGOLOGY | Admitting: OTOLARYNGOLOGY
Payer: MEDICARE

## 2021-10-22 ENCOUNTER — ANESTHESIA (OUTPATIENT)
Dept: SURGERY | Facility: OTHER | Age: 51
End: 2021-10-22
Payer: MEDICARE

## 2021-10-22 DIAGNOSIS — J32.9 SINUSITIS: Primary | ICD-10-CM

## 2021-10-22 LAB
GRAM STN SPEC: NORMAL
GRAM STN SPEC: NORMAL
POCT GLUCOSE: 111 MG/DL (ref 70–110)

## 2021-10-22 PROCEDURE — 27201423 OPTIME MED/SURG SUP & DEVICES STERILE SUPPLY: Mod: HCNC | Performed by: OTOLARYNGOLOGY

## 2021-10-22 PROCEDURE — 31288 NASAL/SINUS ENDOSCOPY SURG: CPT | Mod: 50,51,HCNC, | Performed by: OTOLARYNGOLOGY

## 2021-10-22 PROCEDURE — 87015 SPECIMEN INFECT AGNT CONCNTJ: CPT | Mod: HCNC | Performed by: OTOLARYNGOLOGY

## 2021-10-22 PROCEDURE — 87077 CULTURE AEROBIC IDENTIFY: CPT | Mod: HCNC | Performed by: OTOLARYNGOLOGY

## 2021-10-22 PROCEDURE — 87075 CULTR BACTERIA EXCEPT BLOOD: CPT | Mod: HCNC | Performed by: OTOLARYNGOLOGY

## 2021-10-22 PROCEDURE — 25000242 PHARM REV CODE 250 ALT 637 W/ HCPCS: Mod: HCNC | Performed by: NURSE ANESTHETIST, CERTIFIED REGISTERED

## 2021-10-22 PROCEDURE — C1894 INTRO/SHEATH, NON-LASER: HCPCS | Mod: HCNC | Performed by: OTOLARYNGOLOGY

## 2021-10-22 PROCEDURE — 31267 ENDOSCOPY MAXILLARY SINUS: CPT | Mod: 50,51,HCNC, | Performed by: OTOLARYNGOLOGY

## 2021-10-22 PROCEDURE — 61782 SCAN PROC CRANIAL EXTRA: CPT | Mod: HCNC,,, | Performed by: OTOLARYNGOLOGY

## 2021-10-22 PROCEDURE — 36000710: Mod: HCNC | Performed by: OTOLARYNGOLOGY

## 2021-10-22 PROCEDURE — 37000009 HC ANESTHESIA EA ADD 15 MINS: Mod: HCNC | Performed by: OTOLARYNGOLOGY

## 2021-10-22 PROCEDURE — 87116 MYCOBACTERIA CULTURE: CPT | Mod: HCNC | Performed by: OTOLARYNGOLOGY

## 2021-10-22 PROCEDURE — 30140 RESECT INFERIOR TURBINATE: CPT | Mod: 50,HCNC,, | Performed by: OTOLARYNGOLOGY

## 2021-10-22 PROCEDURE — 88311 PR  DECALCIFY TISSUE: ICD-10-PCS | Mod: 26,HCNC,, | Performed by: PATHOLOGY

## 2021-10-22 PROCEDURE — 31267 PR NASAL/SINUS ENDOSCOPY,RMV TISS MAXILL SINUS: ICD-10-PCS | Mod: 50,51,HCNC, | Performed by: OTOLARYNGOLOGY

## 2021-10-22 PROCEDURE — 87186 SC STD MICRODIL/AGAR DIL: CPT | Mod: HCNC | Performed by: OTOLARYNGOLOGY

## 2021-10-22 PROCEDURE — 88305 TISSUE EXAM BY PATHOLOGIST: ICD-10-PCS | Mod: 26,HCNC,, | Performed by: PATHOLOGY

## 2021-10-22 PROCEDURE — 87070 CULTURE OTHR SPECIMN AEROBIC: CPT | Mod: HCNC | Performed by: OTOLARYNGOLOGY

## 2021-10-22 PROCEDURE — 63600175 PHARM REV CODE 636 W HCPCS: Mod: HCNC | Performed by: OTOLARYNGOLOGY

## 2021-10-22 PROCEDURE — 88305 TISSUE EXAM BY PATHOLOGIST: CPT | Mod: 26,HCNC,, | Performed by: PATHOLOGY

## 2021-10-22 PROCEDURE — 87102 FUNGUS ISOLATION CULTURE: CPT | Mod: HCNC | Performed by: OTOLARYNGOLOGY

## 2021-10-22 PROCEDURE — 87205 SMEAR GRAM STAIN: CPT | Mod: HCNC | Performed by: OTOLARYNGOLOGY

## 2021-10-22 PROCEDURE — 82962 GLUCOSE BLOOD TEST: CPT | Mod: HCNC | Performed by: OTOLARYNGOLOGY

## 2021-10-22 PROCEDURE — 63600175 PHARM REV CODE 636 W HCPCS: Mod: HCNC | Performed by: NURSE ANESTHETIST, CERTIFIED REGISTERED

## 2021-10-22 PROCEDURE — 94640 AIRWAY INHALATION TREATMENT: CPT | Mod: HCNC

## 2021-10-22 PROCEDURE — 25000242 PHARM REV CODE 250 ALT 637 W/ HCPCS: Mod: HCNC | Performed by: ANESTHESIOLOGY

## 2021-10-22 PROCEDURE — 25000003 PHARM REV CODE 250: Mod: HCNC | Performed by: STUDENT IN AN ORGANIZED HEALTH CARE EDUCATION/TRAINING PROGRAM

## 2021-10-22 PROCEDURE — 71000039 HC RECOVERY, EACH ADD'L HOUR: Mod: HCNC | Performed by: OTOLARYNGOLOGY

## 2021-10-22 PROCEDURE — 99900035 HC TECH TIME PER 15 MIN (STAT): Mod: HCNC

## 2021-10-22 PROCEDURE — 87206 SMEAR FLUORESCENT/ACID STAI: CPT | Mod: HCNC | Performed by: OTOLARYNGOLOGY

## 2021-10-22 PROCEDURE — 25000003 PHARM REV CODE 250: Mod: HCNC | Performed by: OTOLARYNGOLOGY

## 2021-10-22 PROCEDURE — 88305 TISSUE EXAM BY PATHOLOGIST: CPT | Mod: HCNC | Performed by: PATHOLOGY

## 2021-10-22 PROCEDURE — 36000711: Mod: HCNC | Performed by: OTOLARYNGOLOGY

## 2021-10-22 PROCEDURE — 31288 PR NASAL/SINUS ENDOSCOPY,REMV TISS SPHENOID: ICD-10-PCS | Mod: 50,51,HCNC, | Performed by: OTOLARYNGOLOGY

## 2021-10-22 PROCEDURE — 30140 PR EXCISION TURBINATE,SUBMUCOUS: ICD-10-PCS | Mod: 50,HCNC,, | Performed by: OTOLARYNGOLOGY

## 2021-10-22 PROCEDURE — 61782 PR STEREOTACTIC COMP ASSIST PROC,CRANIAL,EXTRADURAL: ICD-10-PCS | Mod: HCNC,,, | Performed by: OTOLARYNGOLOGY

## 2021-10-22 PROCEDURE — 94761 N-INVAS EAR/PLS OXIMETRY MLT: CPT | Mod: HCNC

## 2021-10-22 PROCEDURE — 71000033 HC RECOVERY, INTIAL HOUR: Mod: HCNC | Performed by: OTOLARYNGOLOGY

## 2021-10-22 PROCEDURE — 63600175 PHARM REV CODE 636 W HCPCS: Mod: HCNC | Performed by: ANESTHESIOLOGY

## 2021-10-22 PROCEDURE — 25000003 PHARM REV CODE 250: Mod: HCNC | Performed by: NURSE ANESTHETIST, CERTIFIED REGISTERED

## 2021-10-22 PROCEDURE — 25000003 PHARM REV CODE 250: Mod: HCNC | Performed by: ANESTHESIOLOGY

## 2021-10-22 PROCEDURE — 37000008 HC ANESTHESIA 1ST 15 MINUTES: Mod: HCNC | Performed by: OTOLARYNGOLOGY

## 2021-10-22 PROCEDURE — 88311 DECALCIFY TISSUE: CPT | Mod: 26,HCNC,, | Performed by: PATHOLOGY

## 2021-10-22 RX ORDER — NEOSTIGMINE METHYLSULFATE 1 MG/ML
INJECTION, SOLUTION INTRAVENOUS
Status: DISCONTINUED | OUTPATIENT
Start: 2021-10-22 | End: 2021-10-22

## 2021-10-22 RX ORDER — AMOXICILLIN AND CLAVULANATE POTASSIUM 875; 125 MG/1; MG/1
1 TABLET, FILM COATED ORAL EVERY 12 HOURS
Qty: 10 TABLET | Refills: 0 | Status: SHIPPED | OUTPATIENT
Start: 2021-10-22 | End: 2021-10-27

## 2021-10-22 RX ORDER — TALC
6 POWDER (GRAM) TOPICAL NIGHTLY PRN
Status: DISCONTINUED | OUTPATIENT
Start: 2021-10-22 | End: 2021-10-23 | Stop reason: HOSPADM

## 2021-10-22 RX ORDER — LIDOCAINE HYDROCHLORIDE 10 MG/ML
1 INJECTION, SOLUTION EPIDURAL; INFILTRATION; INTRACAUDAL; PERINEURAL ONCE
Status: DISCONTINUED | OUTPATIENT
Start: 2021-10-22 | End: 2021-10-23 | Stop reason: HOSPADM

## 2021-10-22 RX ORDER — EPINEPHRINE 0.1 MG/ML
INJECTION INTRAVENOUS
Status: DISCONTINUED | OUTPATIENT
Start: 2021-10-22 | End: 2021-10-22 | Stop reason: HOSPADM

## 2021-10-22 RX ORDER — SODIUM CHLORIDE 0.9 % (FLUSH) 0.9 %
3 SYRINGE (ML) INJECTION
Status: DISCONTINUED | OUTPATIENT
Start: 2021-10-22 | End: 2021-10-23 | Stop reason: HOSPADM

## 2021-10-22 RX ORDER — ONDANSETRON 8 MG/1
8 TABLET, ORALLY DISINTEGRATING ORAL EVERY 8 HOURS PRN
Status: DISCONTINUED | OUTPATIENT
Start: 2021-10-22 | End: 2021-10-23 | Stop reason: HOSPADM

## 2021-10-22 RX ORDER — BUPIVACAINE HYDROCHLORIDE AND EPINEPHRINE 5; 5 MG/ML; UG/ML
INJECTION, SOLUTION EPIDURAL; INTRACAUDAL; PERINEURAL
Status: DISCONTINUED | OUTPATIENT
Start: 2021-10-22 | End: 2021-10-22 | Stop reason: HOSPADM

## 2021-10-22 RX ORDER — ROCURONIUM BROMIDE 10 MG/ML
INJECTION, SOLUTION INTRAVENOUS
Status: DISCONTINUED | OUTPATIENT
Start: 2021-10-22 | End: 2021-10-22

## 2021-10-22 RX ORDER — SUCCINYLCHOLINE CHLORIDE 20 MG/ML
INJECTION INTRAMUSCULAR; INTRAVENOUS
Status: DISCONTINUED | OUTPATIENT
Start: 2021-10-22 | End: 2021-10-22

## 2021-10-22 RX ORDER — SODIUM CHLORIDE 0.9 % (FLUSH) 0.9 %
10 SYRINGE (ML) INJECTION
Status: DISCONTINUED | OUTPATIENT
Start: 2021-10-22 | End: 2021-10-23 | Stop reason: HOSPADM

## 2021-10-22 RX ORDER — ACETAMINOPHEN 500 MG
1000 TABLET ORAL
Status: COMPLETED | OUTPATIENT
Start: 2021-10-22 | End: 2021-10-22

## 2021-10-22 RX ORDER — ONDANSETRON 4 MG/1
4 TABLET, ORALLY DISINTEGRATING ORAL EVERY 8 HOURS PRN
Qty: 15 TABLET | Refills: 0 | Status: SHIPPED | OUTPATIENT
Start: 2021-10-22 | End: 2022-03-08

## 2021-10-22 RX ORDER — AMOXICILLIN AND CLAVULANATE POTASSIUM 875; 125 MG/1; MG/1
1 TABLET, FILM COATED ORAL EVERY 12 HOURS
Status: DISCONTINUED | OUTPATIENT
Start: 2021-10-22 | End: 2021-10-23 | Stop reason: HOSPADM

## 2021-10-22 RX ORDER — OXYCODONE HYDROCHLORIDE 5 MG/1
5 TABLET ORAL
Status: DISCONTINUED | OUTPATIENT
Start: 2021-10-22 | End: 2021-10-22 | Stop reason: HOSPADM

## 2021-10-22 RX ORDER — ALBUTEROL SULFATE 90 UG/1
AEROSOL, METERED RESPIRATORY (INHALATION)
Status: DISCONTINUED | OUTPATIENT
Start: 2021-10-22 | End: 2021-10-22

## 2021-10-22 RX ORDER — LIDOCAINE HCL/PF 100 MG/5ML
SYRINGE (ML) INTRAVENOUS
Status: DISCONTINUED | OUTPATIENT
Start: 2021-10-22 | End: 2021-10-22

## 2021-10-22 RX ORDER — CEFAZOLIN SODIUM 1 G/3ML
INJECTION, POWDER, FOR SOLUTION INTRAMUSCULAR; INTRAVENOUS
Status: DISCONTINUED | OUTPATIENT
Start: 2021-10-22 | End: 2021-10-22

## 2021-10-22 RX ORDER — HYDROMORPHONE HYDROCHLORIDE 2 MG/ML
0.4 INJECTION, SOLUTION INTRAMUSCULAR; INTRAVENOUS; SUBCUTANEOUS EVERY 5 MIN PRN
Status: DISCONTINUED | OUTPATIENT
Start: 2021-10-22 | End: 2021-10-22 | Stop reason: HOSPADM

## 2021-10-22 RX ORDER — PROPOFOL 10 MG/ML
VIAL (ML) INTRAVENOUS
Status: DISCONTINUED | OUTPATIENT
Start: 2021-10-22 | End: 2021-10-22

## 2021-10-22 RX ORDER — PROCHLORPERAZINE EDISYLATE 5 MG/ML
5 INJECTION INTRAMUSCULAR; INTRAVENOUS EVERY 30 MIN PRN
Status: DISCONTINUED | OUTPATIENT
Start: 2021-10-22 | End: 2021-10-22 | Stop reason: HOSPADM

## 2021-10-22 RX ORDER — OXYMETAZOLINE HCL 0.05 %
SPRAY, NON-AEROSOL (ML) NASAL
Status: DISCONTINUED | OUTPATIENT
Start: 2021-10-22 | End: 2021-10-22 | Stop reason: HOSPADM

## 2021-10-22 RX ORDER — ACETAMINOPHEN 325 MG/1
650 TABLET ORAL EVERY 4 HOURS PRN
Status: DISCONTINUED | OUTPATIENT
Start: 2021-10-22 | End: 2021-10-23 | Stop reason: HOSPADM

## 2021-10-22 RX ORDER — HYDROCODONE BITARTRATE AND ACETAMINOPHEN 5; 325 MG/1; MG/1
1 TABLET ORAL EVERY 4 HOURS PRN
Status: DISCONTINUED | OUTPATIENT
Start: 2021-10-22 | End: 2021-10-23 | Stop reason: HOSPADM

## 2021-10-22 RX ORDER — OXYCODONE AND ACETAMINOPHEN 5; 325 MG/1; MG/1
1 TABLET ORAL EVERY 6 HOURS PRN
Qty: 15 TABLET | Refills: 0 | Status: SHIPPED | OUTPATIENT
Start: 2021-10-22 | End: 2021-12-07

## 2021-10-22 RX ORDER — PROPOFOL 10 MG/ML
VIAL (ML) INTRAVENOUS CONTINUOUS PRN
Status: DISCONTINUED | OUTPATIENT
Start: 2021-10-22 | End: 2021-10-22

## 2021-10-22 RX ORDER — ALBUTEROL SULFATE 2.5 MG/.5ML
2.5 SOLUTION RESPIRATORY (INHALATION)
Status: COMPLETED | OUTPATIENT
Start: 2021-10-22 | End: 2021-10-22

## 2021-10-22 RX ORDER — BACITRACIN ZINC 500 UNIT/G
OINTMENT (GRAM) TOPICAL
Status: DISCONTINUED | OUTPATIENT
Start: 2021-10-22 | End: 2021-10-22 | Stop reason: HOSPADM

## 2021-10-22 RX ORDER — KETAMINE HCL IN 0.9 % NACL 50 MG/5 ML
SYRINGE (ML) INTRAVENOUS
Status: DISCONTINUED | OUTPATIENT
Start: 2021-10-22 | End: 2021-10-22

## 2021-10-22 RX ORDER — MEPERIDINE HYDROCHLORIDE 25 MG/ML
12.5 INJECTION INTRAMUSCULAR; INTRAVENOUS; SUBCUTANEOUS ONCE AS NEEDED
Status: DISCONTINUED | OUTPATIENT
Start: 2021-10-22 | End: 2021-10-22 | Stop reason: HOSPADM

## 2021-10-22 RX ORDER — ONDANSETRON 2 MG/ML
INJECTION INTRAMUSCULAR; INTRAVENOUS
Status: DISCONTINUED | OUTPATIENT
Start: 2021-10-22 | End: 2021-10-22

## 2021-10-22 RX ORDER — LIDOCAINE HYDROCHLORIDE AND EPINEPHRINE 10; 10 MG/ML; UG/ML
INJECTION, SOLUTION INFILTRATION; PERINEURAL
Status: DISCONTINUED | OUTPATIENT
Start: 2021-10-22 | End: 2021-10-22 | Stop reason: HOSPADM

## 2021-10-22 RX ORDER — FENTANYL CITRATE 50 UG/ML
INJECTION, SOLUTION INTRAMUSCULAR; INTRAVENOUS
Status: DISCONTINUED | OUTPATIENT
Start: 2021-10-22 | End: 2021-10-22

## 2021-10-22 RX ORDER — ACETAMINOPHEN 325 MG/1
650 TABLET ORAL EVERY 8 HOURS PRN
Status: DISCONTINUED | OUTPATIENT
Start: 2021-10-22 | End: 2021-10-23 | Stop reason: HOSPADM

## 2021-10-22 RX ORDER — LIDOCAINE HYDROCHLORIDE 10 MG/ML
0.5 INJECTION, SOLUTION EPIDURAL; INFILTRATION; INTRACAUDAL; PERINEURAL ONCE
Status: DISCONTINUED | OUTPATIENT
Start: 2021-10-22 | End: 2021-10-22 | Stop reason: HOSPADM

## 2021-10-22 RX ORDER — VECURONIUM BROMIDE FOR INJECTION 1 MG/ML
INJECTION, POWDER, LYOPHILIZED, FOR SOLUTION INTRAVENOUS
Status: DISCONTINUED | OUTPATIENT
Start: 2021-10-22 | End: 2021-10-22

## 2021-10-22 RX ORDER — PHENYLEPHRINE HYDROCHLORIDE 10 MG/ML
INJECTION INTRAVENOUS
Status: DISCONTINUED | OUTPATIENT
Start: 2021-10-22 | End: 2021-10-22

## 2021-10-22 RX ORDER — SODIUM CHLORIDE, SODIUM LACTATE, POTASSIUM CHLORIDE, CALCIUM CHLORIDE 600; 310; 30; 20 MG/100ML; MG/100ML; MG/100ML; MG/100ML
INJECTION, SOLUTION INTRAVENOUS CONTINUOUS
Status: DISCONTINUED | OUTPATIENT
Start: 2021-10-22 | End: 2021-10-23 | Stop reason: HOSPADM

## 2021-10-22 RX ADMIN — PHENYLEPHRINE HYDROCHLORIDE 200 MCG: 10 INJECTION INTRAVENOUS at 08:10

## 2021-10-22 RX ADMIN — FENTANYL CITRATE 100 MCG: 50 INJECTION, SOLUTION INTRAMUSCULAR; INTRAVENOUS at 07:10

## 2021-10-22 RX ADMIN — HYDROCODONE BITARTRATE AND ACETAMINOPHEN 1 TABLET: 5; 325 TABLET ORAL at 10:10

## 2021-10-22 RX ADMIN — FENTANYL CITRATE 50 MCG: 50 INJECTION, SOLUTION INTRAMUSCULAR; INTRAVENOUS at 07:10

## 2021-10-22 RX ADMIN — PROPOFOL 100 MCG/KG/MIN: 10 INJECTION, EMULSION INTRAVENOUS at 07:10

## 2021-10-22 RX ADMIN — Medication 30 MG: at 07:10

## 2021-10-22 RX ADMIN — SUCCINYLCHOLINE CHLORIDE 160 MG: 20 INJECTION, SOLUTION INTRAMUSCULAR; INTRAVENOUS at 07:10

## 2021-10-22 RX ADMIN — PROPOFOL 250 MG: 10 INJECTION, EMULSION INTRAVENOUS at 07:10

## 2021-10-22 RX ADMIN — AMOXICILLIN AND CLAVULANATE POTASSIUM 1 TABLET: 875; 125 TABLET, FILM COATED ORAL at 12:10

## 2021-10-22 RX ADMIN — OXYCODONE 5 MG: 5 TABLET ORAL at 09:10

## 2021-10-22 RX ADMIN — HYDROMORPHONE HYDROCHLORIDE 0.4 MG: 2 INJECTION INTRAMUSCULAR; INTRAVENOUS; SUBCUTANEOUS at 09:10

## 2021-10-22 RX ADMIN — ROCURONIUM BROMIDE 10 MG: 10 INJECTION, SOLUTION INTRAVENOUS at 07:10

## 2021-10-22 RX ADMIN — NEOSTIGMINE METHYLSULFATE 5 MG: 1 INJECTION INTRAVENOUS at 08:10

## 2021-10-22 RX ADMIN — PROPOFOL 50 MG: 10 INJECTION, EMULSION INTRAVENOUS at 07:10

## 2021-10-22 RX ADMIN — ALBUTEROL SULFATE 5 PUFF: 90 AEROSOL, METERED RESPIRATORY (INHALATION) at 08:10

## 2021-10-22 RX ADMIN — SUGAMMADEX 200 MG: 100 INJECTION, SOLUTION INTRAVENOUS at 08:10

## 2021-10-22 RX ADMIN — HYDROCODONE BITARTRATE AND ACETAMINOPHEN 1 TABLET: 5; 325 TABLET ORAL at 02:10

## 2021-10-22 RX ADMIN — AMOXICILLIN AND CLAVULANATE POTASSIUM 1 TABLET: 875; 125 TABLET, FILM COATED ORAL at 08:10

## 2021-10-22 RX ADMIN — ALBUTEROL SULFATE 2.5 MG: 2.5 SOLUTION RESPIRATORY (INHALATION) at 05:10

## 2021-10-22 RX ADMIN — ONDANSETRON HYDROCHLORIDE 4 MG: 2 INJECTION INTRAMUSCULAR; INTRAVENOUS at 08:10

## 2021-10-22 RX ADMIN — HYDROCODONE BITARTRATE AND ACETAMINOPHEN 1 TABLET: 5; 325 TABLET ORAL at 06:10

## 2021-10-22 RX ADMIN — CEFAZOLIN 3 G: 330 INJECTION, POWDER, FOR SOLUTION INTRAMUSCULAR; INTRAVENOUS at 07:10

## 2021-10-22 RX ADMIN — PHENYLEPHRINE HYDROCHLORIDE 0.5 MCG/KG/MIN: 10 INJECTION INTRAVENOUS at 08:10

## 2021-10-22 RX ADMIN — GLYCOPYRROLATE 0.6 MG: 0.2 INJECTION, SOLUTION INTRAMUSCULAR; INTRAVITREAL at 08:10

## 2021-10-22 RX ADMIN — VECURONIUM BROMIDE FOR INJECTION 2 MG: 1 INJECTION, POWDER, LYOPHILIZED, FOR SOLUTION INTRAVENOUS at 08:10

## 2021-10-22 RX ADMIN — LIDOCAINE HYDROCHLORIDE 100 MG: 20 INJECTION, SOLUTION INTRAVENOUS at 07:10

## 2021-10-22 RX ADMIN — ACETAMINOPHEN 1000 MG: 500 TABLET, FILM COATED ORAL at 06:10

## 2021-10-22 RX ADMIN — ROCURONIUM BROMIDE 40 MG: 10 INJECTION, SOLUTION INTRAVENOUS at 07:10

## 2021-10-23 VITALS
WEIGHT: 293 LBS | TEMPERATURE: 98 F | RESPIRATION RATE: 18 BRPM | DIASTOLIC BLOOD PRESSURE: 86 MMHG | OXYGEN SATURATION: 98 % | SYSTOLIC BLOOD PRESSURE: 144 MMHG | HEART RATE: 81 BPM | BODY MASS INDEX: 50.02 KG/M2 | HEIGHT: 64 IN

## 2021-10-23 PROCEDURE — 25000003 PHARM REV CODE 250: Mod: HCNC | Performed by: STUDENT IN AN ORGANIZED HEALTH CARE EDUCATION/TRAINING PROGRAM

## 2021-10-23 PROCEDURE — 27000190 HC CPAP FULL FACE MASK W/VALVE: Mod: HCNC

## 2021-10-23 PROCEDURE — 27000221 HC OXYGEN, UP TO 24 HOURS: Mod: HCNC

## 2021-10-23 PROCEDURE — 99900035 HC TECH TIME PER 15 MIN (STAT): Mod: HCNC

## 2021-10-23 PROCEDURE — 94761 N-INVAS EAR/PLS OXIMETRY MLT: CPT | Mod: HCNC

## 2021-10-23 RX ADMIN — AMOXICILLIN AND CLAVULANATE POTASSIUM 1 TABLET: 875; 125 TABLET, FILM COATED ORAL at 08:10

## 2021-10-23 RX ADMIN — HYDROCODONE BITARTRATE AND ACETAMINOPHEN 1 TABLET: 5; 325 TABLET ORAL at 06:10

## 2021-10-24 RX ORDER — TRIAMCINOLONE ACETONIDE 40 MG/ML
40 INJECTION, SUSPENSION INTRA-ARTICULAR; INTRAMUSCULAR
Status: COMPLETED | OUTPATIENT
Start: 2021-10-24 | End: 2021-10-24

## 2021-10-24 RX ADMIN — TRIAMCINOLONE ACETONIDE 40 MG: 40 INJECTION, SUSPENSION INTRA-ARTICULAR; INTRAMUSCULAR at 10:10

## 2021-10-25 ENCOUNTER — PROCEDURE VISIT (OUTPATIENT)
Dept: PHYSICAL MEDICINE AND REHAB | Facility: CLINIC | Age: 51
End: 2021-10-25
Payer: MEDICARE

## 2021-10-25 DIAGNOSIS — M54.41 CHRONIC BILATERAL LOW BACK PAIN WITH BILATERAL SCIATICA: ICD-10-CM

## 2021-10-25 DIAGNOSIS — M54.42 CHRONIC BILATERAL LOW BACK PAIN WITH BILATERAL SCIATICA: ICD-10-CM

## 2021-10-25 DIAGNOSIS — M47.26 OTHER SPONDYLOSIS WITH RADICULOPATHY, LUMBAR REGION: ICD-10-CM

## 2021-10-25 DIAGNOSIS — M54.16 LUMBAR RADICULOPATHY: ICD-10-CM

## 2021-10-25 DIAGNOSIS — M48.062 SPINAL STENOSIS, LUMBAR REGION WITH NEUROGENIC CLAUDICATION: ICD-10-CM

## 2021-10-25 DIAGNOSIS — M51.36 DDD (DEGENERATIVE DISC DISEASE), LUMBAR: ICD-10-CM

## 2021-10-25 DIAGNOSIS — G89.29 CHRONIC BILATERAL LOW BACK PAIN WITH BILATERAL SCIATICA: ICD-10-CM

## 2021-10-25 LAB — BACTERIA SPEC AEROBE CULT: ABNORMAL

## 2021-10-25 PROCEDURE — 95910 PR NERVE CONDUCTION STUDY; 7-8 STUDIES: ICD-10-PCS | Mod: HCNC,S$GLB,, | Performed by: PHYSICAL MEDICINE & REHABILITATION

## 2021-10-25 PROCEDURE — 95886 PR EMG COMPLETE, W/ NERVE CONDUCTION STUDIES, 5+ MUSCLES: ICD-10-PCS | Mod: HCNC,S$GLB,, | Performed by: PHYSICAL MEDICINE & REHABILITATION

## 2021-10-25 PROCEDURE — 95910 NRV CNDJ TEST 7-8 STUDIES: CPT | Mod: HCNC,S$GLB,, | Performed by: PHYSICAL MEDICINE & REHABILITATION

## 2021-10-25 PROCEDURE — 95886 MUSC TEST DONE W/N TEST COMP: CPT | Mod: HCNC,S$GLB,, | Performed by: PHYSICAL MEDICINE & REHABILITATION

## 2021-10-26 ENCOUNTER — OFFICE VISIT (OUTPATIENT)
Dept: FAMILY MEDICINE | Facility: CLINIC | Age: 51
End: 2021-10-26
Payer: MEDICARE

## 2021-10-26 VITALS
DIASTOLIC BLOOD PRESSURE: 76 MMHG | TEMPERATURE: 98 F | WEIGHT: 293 LBS | BODY MASS INDEX: 50.02 KG/M2 | RESPIRATION RATE: 19 BRPM | OXYGEN SATURATION: 98 % | HEIGHT: 64 IN | SYSTOLIC BLOOD PRESSURE: 118 MMHG | HEART RATE: 81 BPM

## 2021-10-26 DIAGNOSIS — I10 ESSENTIAL HYPERTENSION: Primary | Chronic | ICD-10-CM

## 2021-10-26 DIAGNOSIS — G43.009 MIGRAINE WITHOUT AURA AND WITHOUT STATUS MIGRAINOSUS, NOT INTRACTABLE: Chronic | ICD-10-CM

## 2021-10-26 LAB — BACTERIA SPEC ANAEROBE CULT: NORMAL

## 2021-10-26 PROCEDURE — 3074F SYST BP LT 130 MM HG: CPT | Mod: HCNC,CPTII,S$GLB, | Performed by: NURSE PRACTITIONER

## 2021-10-26 PROCEDURE — 3074F PR MOST RECENT SYSTOLIC BLOOD PRESSURE < 130 MM HG: ICD-10-PCS | Mod: HCNC,CPTII,S$GLB, | Performed by: NURSE PRACTITIONER

## 2021-10-26 PROCEDURE — 99999 PR PBB SHADOW E&M-EST. PATIENT-LVL V: CPT | Mod: PBBFAC,HCNC,, | Performed by: NURSE PRACTITIONER

## 2021-10-26 PROCEDURE — 3008F BODY MASS INDEX DOCD: CPT | Mod: HCNC,CPTII,S$GLB, | Performed by: NURSE PRACTITIONER

## 2021-10-26 PROCEDURE — 3078F PR MOST RECENT DIASTOLIC BLOOD PRESSURE < 80 MM HG: ICD-10-PCS | Mod: HCNC,CPTII,S$GLB, | Performed by: NURSE PRACTITIONER

## 2021-10-26 PROCEDURE — 3044F HG A1C LEVEL LT 7.0%: CPT | Mod: HCNC,CPTII,S$GLB, | Performed by: NURSE PRACTITIONER

## 2021-10-26 PROCEDURE — 3044F PR MOST RECENT HEMOGLOBIN A1C LEVEL <7.0%: ICD-10-PCS | Mod: HCNC,CPTII,S$GLB, | Performed by: NURSE PRACTITIONER

## 2021-10-26 PROCEDURE — 3008F PR BODY MASS INDEX (BMI) DOCUMENTED: ICD-10-PCS | Mod: HCNC,CPTII,S$GLB, | Performed by: NURSE PRACTITIONER

## 2021-10-26 PROCEDURE — 1159F MED LIST DOCD IN RCRD: CPT | Mod: HCNC,CPTII,S$GLB, | Performed by: NURSE PRACTITIONER

## 2021-10-26 PROCEDURE — 1160F PR REVIEW ALL MEDS BY PRESCRIBER/CLIN PHARMACIST DOCUMENTED: ICD-10-PCS | Mod: HCNC,CPTII,S$GLB, | Performed by: NURSE PRACTITIONER

## 2021-10-26 PROCEDURE — 99213 PR OFFICE/OUTPT VISIT, EST, LEVL III, 20-29 MIN: ICD-10-PCS | Mod: HCNC,S$GLB,, | Performed by: NURSE PRACTITIONER

## 2021-10-26 PROCEDURE — 4010F ACE/ARB THERAPY RXD/TAKEN: CPT | Mod: HCNC,CPTII,S$GLB, | Performed by: NURSE PRACTITIONER

## 2021-10-26 PROCEDURE — 1159F PR MEDICATION LIST DOCUMENTED IN MEDICAL RECORD: ICD-10-PCS | Mod: HCNC,CPTII,S$GLB, | Performed by: NURSE PRACTITIONER

## 2021-10-26 PROCEDURE — 1160F RVW MEDS BY RX/DR IN RCRD: CPT | Mod: HCNC,CPTII,S$GLB, | Performed by: NURSE PRACTITIONER

## 2021-10-26 PROCEDURE — 99999 PR PBB SHADOW E&M-EST. PATIENT-LVL V: ICD-10-PCS | Mod: PBBFAC,HCNC,, | Performed by: NURSE PRACTITIONER

## 2021-10-26 PROCEDURE — 4010F PR ACE/ARB THEARPY RXD/TAKEN: ICD-10-PCS | Mod: HCNC,CPTII,S$GLB, | Performed by: NURSE PRACTITIONER

## 2021-10-26 PROCEDURE — 3078F DIAST BP <80 MM HG: CPT | Mod: HCNC,CPTII,S$GLB, | Performed by: NURSE PRACTITIONER

## 2021-10-26 PROCEDURE — 99213 OFFICE O/P EST LOW 20 MIN: CPT | Mod: HCNC,S$GLB,, | Performed by: NURSE PRACTITIONER

## 2021-10-27 ENCOUNTER — OFFICE VISIT (OUTPATIENT)
Dept: OTOLARYNGOLOGY | Facility: CLINIC | Age: 51
End: 2021-10-27
Payer: MEDICARE

## 2021-10-27 VITALS — HEART RATE: 84 BPM | DIASTOLIC BLOOD PRESSURE: 86 MMHG | SYSTOLIC BLOOD PRESSURE: 149 MMHG | TEMPERATURE: 97 F

## 2021-10-27 DIAGNOSIS — Z98.890 S/P FESS (FUNCTIONAL ENDOSCOPIC SINUS SURGERY): Primary | ICD-10-CM

## 2021-10-27 PROCEDURE — 31237 PR NASAL/SINUS ENDOSCOPY,BX/RMV POLYP/DEBRID: ICD-10-PCS | Mod: 50,S$GLB,, | Performed by: OTOLARYNGOLOGY

## 2021-10-27 PROCEDURE — 31237 NSL/SINS NDSC SURG BX POLYPC: CPT | Mod: 50,S$GLB,, | Performed by: OTOLARYNGOLOGY

## 2021-10-27 PROCEDURE — 99024 POSTOP FOLLOW-UP VISIT: CPT | Mod: S$GLB,,, | Performed by: OTOLARYNGOLOGY

## 2021-10-27 PROCEDURE — 99024 PR POST-OP FOLLOW-UP VISIT: ICD-10-PCS | Mod: S$GLB,,, | Performed by: OTOLARYNGOLOGY

## 2021-10-28 LAB
FINAL PATHOLOGIC DIAGNOSIS: NORMAL
Lab: NORMAL

## 2021-11-09 ENCOUNTER — PATIENT MESSAGE (OUTPATIENT)
Dept: OTOLARYNGOLOGY | Facility: CLINIC | Age: 51
End: 2021-11-09
Payer: MEDICARE

## 2021-11-09 NOTE — PROGRESS NOTES
Routine Office Visit    Patient Name: Yanni Kaiser    : 1970  MRN: [id number]    Subjective:  Yanni is a 46 y.o. female who presents today for:    1. Cough and shortness of breath  Patient presenting today with shortness of breath and productive cough for 2 days.  There has been no documented fevers, but patient reports recurring chills.  She only has her Breo inhaler and not a rescue inhaler.  She recently got off her insulin and is now on oral hypoglycemic medication.  There has been wheezing.  No hemoptysis or cough induced emesis.      Past Medical History  Past Medical History:   Diagnosis Date    Allergy     Asthma     Diabetes mellitus     Diabetes mellitus, type 2     GERD (gastroesophageal reflux disease)     High cholesterol     Hypertension     Pseudotumor cerebri     Seizures     Sleep apnea        Past Surgical History  Past Surgical History:   Procedure Laterality Date    CHOLECYSTECTOMY      GALLBLADDER SURGERY      SINUS SURGERY         Family History  Family History   Problem Relation Age of Onset    Cancer Mother     Hypertension Mother     Diabetes Mother     Stroke Father     Heart disease Father     COPD Father     Heart attack Father     Cancer Maternal Grandmother        Social History  Social History     Social History    Marital status:      Spouse name: N/A    Number of children: N/A    Years of education: N/A     Occupational History    Not on file.     Social History Main Topics    Smoking status: Current Every Day Smoker     Packs/day: 1.00     Years: 15.00     Types: Cigarettes     Last attempt to quit: 6/10/2015    Smokeless tobacco: Never Used    Alcohol use No    Drug use: No    Sexual activity: Yes     Partners: Male     Birth control/ protection: None     Other Topics Concern    Not on file     Social History Narrative       Current Medications  Current Outpatient Prescriptions on File Prior to Visit   Medication Sig Dispense  Refill    acetaZOLAMIDE (DIAMOX) 500 mg CpSR Take 500 mg by mouth 2 (two) times daily.      atorvastatin (LIPITOR) 20 MG tablet Take 1 tablet (20 mg total) by mouth once daily. 90 tablet 1    blood sugar diagnostic Strp 1 each by Misc.(Non-Drug; Combo Route) route 4 (four) times daily before meals and nightly. ACCU CHEK ELVIA PLUS METER 200 each 3    blood-glucose meter Misc 1 each by Misc.(Non-Drug; Combo Route) route 4 (four) times daily before meals and nightly. ACCU CHEK ELVIA PLUS METER 1 each 0    butalbital-acetaminophen-caffeine -40 mg (FIORICET, ESGIC) -40 mg per tablet TK 1 T PO Q 8 H PRF PAIN OR HEADACHES  2    capsaicin 0.1 % Crea Apply 1 application topically 2 (two) times daily. 56.6 g 1    EPIPEN 2-RHODA 0.3 mg/0.3 mL (1:1,000) AtIn   1    fluticasone (FLONASE) 50 mcg/actuation nasal spray 1 spray by Each Nare route daily as needed. 16 g 1    fluticasone-vilanterol (BREO ELLIPTA) 200-25 mcg/dose DsDv diskus inhaler Inhale 1 puff into the lungs once daily. 1 each 3    gabapentin (NEURONTIN) 300 MG capsule Take 2 capsules (600 mg total) by mouth every evening. 180 capsule 11    insulin aspart (NOVOLOG) 100 unit/mL injection Inject 15 Units into the skin 3 (three) times daily before meals. 13.5 mL 11    insulin detemir (LEVEMIR FLEXTOUCH) 100 unit/mL (3 mL) SubQ InPn pen Inject 45 Units into the skin every evening. 13.5 mL 11    lamotrigine (LAMICTAL) 25 MG tablet Take 1 tablet (25 mg total) by mouth 2 (two) times daily. 60 tablet 11    lancets (ACCU-CHEK SOFTCLIX LANCETS) Misc 1 Device by Misc.(Non-Drug; Combo Route) route 4 (four) times daily. 200 each 3    lancets 25 gauge Misc 1 lancet by Misc.(Non-Drug; Combo Route) route 4 (four) times daily before meals and nightly. 200 each 11    LINZESS 290 mcg Cap TK ONE CAPSULE PO D ON AN EMPTY STOMACH  5    losartan (COZAAR) 100 MG tablet Take 1 tablet (100 mg total) by mouth once daily. 90 tablet 3    metformin (GLUCOPHAGE-XR) 500  "MG 24 hr tablet Take 2 tablets (1,000 mg total) by mouth 2 (two) times daily with meals. 360 tablet 0    montelukast (SINGULAIR) 10 mg tablet Take 1 tablet (10 mg total) by mouth once daily. 30 tablet 5    oxycodone-acetaminophen (PERCOCET) 5-325 mg per tablet Take 1 tablet by mouth every 8 (eight) hours as needed for Pain. 90 tablet 0    pantoprazole (PROTONIX) 40 MG tablet once daily.   0    primidone (MYSOLINE) 50 MG Tab Take 1 tablet (50 mg total) by mouth every evening. 90 tablet 3    TAZTIA  mg CpSR TK ONE CAPSULE PO D  2    topiramate (TOPAMAX) 200 MG Tab Take 1 tablet (200 mg total) by mouth 2 (two) times daily. 60 tablet 11    venlafaxine (EFFEXOR-XR) 75 MG 24 hr capsule Take 75 mg by mouth once daily.      [DISCONTINUED] ipratropium (ATROVENT) 0.02 % nebulizer solution Take 2.5 mLs (500 mcg total) by nebulization 4 (four) times daily. 100 vial 0    [DISCONTINUED] levalbuterol (XOPENEX) 1.25 mg/0.5 mL nebulizer solution Take 0.5 mLs (1.25 mg total) by nebulization every 4 (four) hours as needed for Wheezing. 100 each 2    nystatin-triamcinolone (MYCOLOG) ointment Apply topically 2 (two) times daily. 60 g 2     No current facility-administered medications on file prior to visit.        Allergies   Review of patient's allergies indicates:  No Known Allergies    Review of Systems (Pertinent positives)  Review of Systems   Constitutional: Positive for chills and malaise/fatigue.   HENT: Positive for congestion.    Eyes: Negative.    Respiratory: Positive for cough, sputum production, shortness of breath and wheezing.    Cardiovascular: Negative.    Gastrointestinal: Negative.    Musculoskeletal: Negative.          /76 (BP Location: Left arm, Patient Position: Sitting, BP Method: Manual)  Pulse 108  Temp 99.7 °F (37.6 °C) (Oral)   Resp 18  Ht 5' 4" (1.626 m)  Wt (!) 166 kg (365 lb 15.4 oz)  LMP 10/24/2016  SpO2 96%  BMI 62.82 kg/m2    GENERAL APPEARANCE: in no apparent distress and " well developed and well nourished  HEENT: PERRL, EOMI, Sclera clear, anicteric, Oropharynx clear, no lesions, Neck supple with midline trachea  NECK: normal, supple, no adenopathy, thyroid normal in size  RESPIRATORY: appears well, vitals normal, no respiratory distress, acyanotic, normal RR, wheezing throughout all lung fields prior to duoneb and improved with treatment; no crackles  HEART: regular rate and rhythm, S1, S2 normal, no murmur, click, rub or gallop.    ABDOMEN: abdomen is soft without tenderness, no masses, no hernias, no organomegaly, no rebound, no guarding. Suprapubic tenderness absent. No CVA tenderness.  SKIN: no rashes, no wounds, no other lesions  PSYCH: Alert, oriented x 3, thought content appropriate, speech normal, pleasant and cooperative, good eye contact, well groomed    Assessment/Plan:  Yanni Kaiser is a 46 y.o. female who presents today for :    Yanni was seen today for uri.    Diagnoses and all orders for this visit:    Asthma exacerbation  -     albuterol-ipratropium 2.5mg-0.5mg/3mL nebulizer solution 3 mL; Take 3 mLs by nebulization one time.  -     X-Ray Chest PA And Lateral; Future  -     predniSONE (DELTASONE) 20 MG tablet; Take 2 tablets (40 mg total) by mouth once daily.  -     azithromycin (Z-RHODA) 250 MG tablet; Take 2 tablets by mouth on day 1; Take 1 tablet by mouth on days 2-5  -     albuterol-ipratropium 2.5mg-0.5mg/3mL (DUO-NEB) 0.5 mg-3 mg(2.5 mg base)/3 mL nebulizer solution; Take 3 mLs by nebulization every 6 (six) hours as needed for Wheezing or Shortness of Breath. Rescue  -     albuterol (ACCUNEB) 1.25 mg/3 mL Nebu; Take 3 mLs (1.25 mg total) by nebulization every 6 (six) hours as needed. Rescue  -     guaifenesin (MUCINEX) 1,200 mg Ta12; Take 1 tablet by mouth 2 (two) times daily.      1. Symptoms did improve with treatment  2.  Will need to start on nebulizer treatments and steroids  3.  Patients pulse ox after treatmetn was 99%  4.  CXR to be done  No today  5.  Follow up as needed  6.  She is to go to the ED should symptoms worsen  7.  Sliding scale given as she will be on steroids and she is to follow up with Jessica Dwyer NP should blood sugars not come down or get above 300mg/dl    Carlyle Gordillo MD

## 2021-11-10 ENCOUNTER — OFFICE VISIT (OUTPATIENT)
Dept: OTOLARYNGOLOGY | Facility: CLINIC | Age: 51
End: 2021-11-10
Payer: MEDICARE

## 2021-11-10 ENCOUNTER — HOSPITAL ENCOUNTER (EMERGENCY)
Facility: OTHER | Age: 51
Discharge: HOME | End: 2021-11-10
Attending: EMERGENCY MEDICINE
Payer: MEDICARE

## 2021-11-10 VITALS
TEMPERATURE: 99 F | OXYGEN SATURATION: 96 % | WEIGHT: 293 LBS | HEART RATE: 71 BPM | RESPIRATION RATE: 18 BRPM | DIASTOLIC BLOOD PRESSURE: 72 MMHG | HEIGHT: 64 IN | BODY MASS INDEX: 50.02 KG/M2 | SYSTOLIC BLOOD PRESSURE: 152 MMHG

## 2021-11-10 VITALS — SYSTOLIC BLOOD PRESSURE: 175 MMHG | DIASTOLIC BLOOD PRESSURE: 109 MMHG | HEART RATE: 101 BPM | TEMPERATURE: 98 F

## 2021-11-10 DIAGNOSIS — G43.809 OTHER MIGRAINE WITHOUT STATUS MIGRAINOSUS, NOT INTRACTABLE: Primary | ICD-10-CM

## 2021-11-10 DIAGNOSIS — Z98.890 S/P FESS (FUNCTIONAL ENDOSCOPIC SINUS SURGERY): Primary | ICD-10-CM

## 2021-11-10 DIAGNOSIS — I10 ESSENTIAL HYPERTENSION: Chronic | ICD-10-CM

## 2021-11-10 DIAGNOSIS — I10 HYPERTENSION, UNSPECIFIED TYPE: ICD-10-CM

## 2021-11-10 DIAGNOSIS — J32.0 MAXILLARY SINUSITIS, UNSPECIFIED CHRONICITY: ICD-10-CM

## 2021-11-10 DIAGNOSIS — R51.9 HEADACHE, UNSPECIFIED HEADACHE TYPE: ICD-10-CM

## 2021-11-10 LAB
ANION GAP SERPL CALC-SCNC: 10 MMOL/L (ref 8–16)
BASOPHILS # BLD AUTO: 0.04 K/UL (ref 0–0.2)
BASOPHILS NFR BLD: 0.5 % (ref 0–1.9)
BUN SERPL-MCNC: 13 MG/DL (ref 6–20)
CALCIUM SERPL-MCNC: 8.9 MG/DL (ref 8.7–10.5)
CHLORIDE SERPL-SCNC: 102 MMOL/L (ref 95–110)
CO2 SERPL-SCNC: 27 MMOL/L (ref 23–29)
CREAT SERPL-MCNC: 1.3 MG/DL (ref 0.5–1.4)
DIFFERENTIAL METHOD: ABNORMAL
EOSINOPHIL # BLD AUTO: 0.2 K/UL (ref 0–0.5)
EOSINOPHIL NFR BLD: 3 % (ref 0–8)
ERYTHROCYTE [DISTWIDTH] IN BLOOD BY AUTOMATED COUNT: 13.2 % (ref 11.5–14.5)
EST. GFR  (AFRICAN AMERICAN): 56 ML/MIN/1.73 M^2
EST. GFR  (NON AFRICAN AMERICAN): 48 ML/MIN/1.73 M^2
GLUCOSE SERPL-MCNC: 81 MG/DL (ref 70–110)
HCT VFR BLD AUTO: 38.5 % (ref 37–48.5)
HCV AB SERPL QL IA: NEGATIVE
HGB BLD-MCNC: 12.3 G/DL (ref 12–16)
HIV 1+2 AB+HIV1 P24 AG SERPL QL IA: NEGATIVE
IMM GRANULOCYTES # BLD AUTO: 0.05 K/UL (ref 0–0.04)
IMM GRANULOCYTES NFR BLD AUTO: 0.6 % (ref 0–0.5)
LYMPHOCYTES # BLD AUTO: 1.5 K/UL (ref 1–4.8)
LYMPHOCYTES NFR BLD: 18.1 % (ref 18–48)
MCH RBC QN AUTO: 31.3 PG (ref 27–31)
MCHC RBC AUTO-ENTMCNC: 31.9 G/DL (ref 32–36)
MCV RBC AUTO: 98 FL (ref 82–98)
MONOCYTES # BLD AUTO: 0.8 K/UL (ref 0.3–1)
MONOCYTES NFR BLD: 10.2 % (ref 4–15)
NEUTROPHILS # BLD AUTO: 5.4 K/UL (ref 1.8–7.7)
NEUTROPHILS NFR BLD: 67.6 % (ref 38–73)
NRBC BLD-RTO: 0 /100 WBC
PLATELET # BLD AUTO: 263 K/UL (ref 150–450)
PMV BLD AUTO: 8.6 FL (ref 9.2–12.9)
POTASSIUM SERPL-SCNC: 4.4 MMOL/L (ref 3.5–5.1)
RBC # BLD AUTO: 3.93 M/UL (ref 4–5.4)
SODIUM SERPL-SCNC: 139 MMOL/L (ref 136–145)
WBC # BLD AUTO: 8.02 K/UL (ref 3.9–12.7)

## 2021-11-10 PROCEDURE — 96372 THER/PROPH/DIAG INJ SC/IM: CPT | Mod: 59,HCNC

## 2021-11-10 PROCEDURE — 3080F PR MOST RECENT DIASTOLIC BLOOD PRESSURE >= 90 MM HG: ICD-10-PCS | Mod: CPTII,S$GLB,, | Performed by: OTOLARYNGOLOGY

## 2021-11-10 PROCEDURE — 3077F PR MOST RECENT SYSTOLIC BLOOD PRESSURE >= 140 MM HG: ICD-10-PCS | Mod: CPTII,S$GLB,, | Performed by: OTOLARYNGOLOGY

## 2021-11-10 PROCEDURE — 31237 NSL/SINS NDSC SURG BX POLYPC: CPT | Mod: LT,S$GLB,, | Performed by: OTOLARYNGOLOGY

## 2021-11-10 PROCEDURE — 85025 COMPLETE CBC W/AUTO DIFF WBC: CPT | Mod: HCNC | Performed by: NURSE PRACTITIONER

## 2021-11-10 PROCEDURE — 25000003 PHARM REV CODE 250: Mod: HCNC | Performed by: NURSE PRACTITIONER

## 2021-11-10 PROCEDURE — 4010F PR ACE/ARB THEARPY RXD/TAKEN: ICD-10-PCS | Mod: CPTII,S$GLB,, | Performed by: OTOLARYNGOLOGY

## 2021-11-10 PROCEDURE — 99285 EMERGENCY DEPT VISIT HI MDM: CPT | Mod: 25,HCNC

## 2021-11-10 PROCEDURE — 96375 TX/PRO/DX INJ NEW DRUG ADDON: CPT | Mod: HCNC

## 2021-11-10 PROCEDURE — 3077F SYST BP >= 140 MM HG: CPT | Mod: CPTII,S$GLB,, | Performed by: OTOLARYNGOLOGY

## 2021-11-10 PROCEDURE — 3044F PR MOST RECENT HEMOGLOBIN A1C LEVEL <7.0%: ICD-10-PCS | Mod: CPTII,S$GLB,, | Performed by: OTOLARYNGOLOGY

## 2021-11-10 PROCEDURE — 3080F DIAST BP >= 90 MM HG: CPT | Mod: CPTII,S$GLB,, | Performed by: OTOLARYNGOLOGY

## 2021-11-10 PROCEDURE — 99024 POSTOP FOLLOW-UP VISIT: CPT | Mod: S$GLB,,, | Performed by: OTOLARYNGOLOGY

## 2021-11-10 PROCEDURE — 31237 PR NASAL/SINUS ENDOSCOPY,BX/RMV POLYP/DEBRID: ICD-10-PCS | Mod: LT,S$GLB,, | Performed by: OTOLARYNGOLOGY

## 2021-11-10 PROCEDURE — 4010F ACE/ARB THERAPY RXD/TAKEN: CPT | Mod: CPTII,S$GLB,, | Performed by: OTOLARYNGOLOGY

## 2021-11-10 PROCEDURE — 1159F MED LIST DOCD IN RCRD: CPT | Mod: CPTII,S$GLB,, | Performed by: OTOLARYNGOLOGY

## 2021-11-10 PROCEDURE — 1159F PR MEDICATION LIST DOCUMENTED IN MEDICAL RECORD: ICD-10-PCS | Mod: CPTII,S$GLB,, | Performed by: OTOLARYNGOLOGY

## 2021-11-10 PROCEDURE — 63600175 PHARM REV CODE 636 W HCPCS: Mod: HCNC | Performed by: NURSE PRACTITIONER

## 2021-11-10 PROCEDURE — 3044F HG A1C LEVEL LT 7.0%: CPT | Mod: CPTII,S$GLB,, | Performed by: OTOLARYNGOLOGY

## 2021-11-10 PROCEDURE — 86803 HEPATITIS C AB TEST: CPT | Mod: HCNC | Performed by: EMERGENCY MEDICINE

## 2021-11-10 PROCEDURE — 87389 HIV-1 AG W/HIV-1&-2 AB AG IA: CPT | Mod: HCNC | Performed by: EMERGENCY MEDICINE

## 2021-11-10 PROCEDURE — 99024 PR POST-OP FOLLOW-UP VISIT: ICD-10-PCS | Mod: S$GLB,,, | Performed by: OTOLARYNGOLOGY

## 2021-11-10 PROCEDURE — 80048 BASIC METABOLIC PNL TOTAL CA: CPT | Mod: HCNC | Performed by: NURSE PRACTITIONER

## 2021-11-10 PROCEDURE — 96374 THER/PROPH/DIAG INJ IV PUSH: CPT | Mod: HCNC

## 2021-11-10 RX ORDER — KETOROLAC TROMETHAMINE 30 MG/ML
30 INJECTION, SOLUTION INTRAMUSCULAR; INTRAVENOUS
Status: COMPLETED | OUTPATIENT
Start: 2021-11-10 | End: 2021-11-10

## 2021-11-10 RX ORDER — DEXAMETHASONE SODIUM PHOSPHATE 4 MG/ML
8 INJECTION, SOLUTION INTRA-ARTICULAR; INTRALESIONAL; INTRAMUSCULAR; INTRAVENOUS; SOFT TISSUE
Status: COMPLETED | OUTPATIENT
Start: 2021-11-10 | End: 2021-11-10

## 2021-11-10 RX ORDER — ACETAMINOPHEN 500 MG
1000 TABLET ORAL
Status: COMPLETED | OUTPATIENT
Start: 2021-11-10 | End: 2021-11-10

## 2021-11-10 RX ORDER — KETOROLAC TROMETHAMINE 30 MG/ML
30 INJECTION, SOLUTION INTRAMUSCULAR; INTRAVENOUS
Status: DISCONTINUED | OUTPATIENT
Start: 2021-11-10 | End: 2021-11-10

## 2021-11-10 RX ORDER — BUTALBITAL, ACETAMINOPHEN AND CAFFEINE 50; 325; 40 MG/1; MG/1; MG/1
1 TABLET ORAL EVERY 4 HOURS PRN
Qty: 12 TABLET | Refills: 0 | Status: SHIPPED | OUTPATIENT
Start: 2021-11-10 | End: 2022-04-13

## 2021-11-10 RX ORDER — SUMATRIPTAN 6 MG/.5ML
6 INJECTION, SOLUTION SUBCUTANEOUS
Status: COMPLETED | OUTPATIENT
Start: 2021-11-10 | End: 2021-11-10

## 2021-11-10 RX ORDER — PROCHLORPERAZINE EDISYLATE 5 MG/ML
10 INJECTION INTRAMUSCULAR; INTRAVENOUS ONCE
Status: COMPLETED | OUTPATIENT
Start: 2021-11-10 | End: 2021-11-10

## 2021-11-10 RX ORDER — CLONIDINE HYDROCHLORIDE 0.1 MG/1
0.1 TABLET ORAL
Status: COMPLETED | OUTPATIENT
Start: 2021-11-10 | End: 2021-11-10

## 2021-11-10 RX ORDER — LOSARTAN POTASSIUM 50 MG/1
50 TABLET ORAL
Status: COMPLETED | OUTPATIENT
Start: 2021-11-10 | End: 2021-11-10

## 2021-11-10 RX ORDER — PROCHLORPERAZINE EDISYLATE 5 MG/ML
10 INJECTION INTRAMUSCULAR; INTRAVENOUS ONCE
Status: DISCONTINUED | OUTPATIENT
Start: 2021-11-10 | End: 2021-11-10

## 2021-11-10 RX ADMIN — CLONIDINE HYDROCHLORIDE 0.1 MG: 0.1 TABLET ORAL at 03:11

## 2021-11-10 RX ADMIN — PROCHLORPERAZINE EDISYLATE 10 MG: 5 INJECTION INTRAMUSCULAR; INTRAVENOUS at 01:11

## 2021-11-10 RX ADMIN — ACETAMINOPHEN 1000 MG: 500 TABLET, FILM COATED ORAL at 01:11

## 2021-11-10 RX ADMIN — LOSARTAN POTASSIUM 50 MG: 50 TABLET, FILM COATED ORAL at 05:11

## 2021-11-10 RX ADMIN — KETOROLAC TROMETHAMINE 30 MG: 30 INJECTION, SOLUTION INTRAMUSCULAR at 01:11

## 2021-11-10 RX ADMIN — DEXAMETHASONE SODIUM PHOSPHATE 8 MG: 4 INJECTION INTRA-ARTICULAR; INTRALESIONAL; INTRAMUSCULAR; INTRAVENOUS; SOFT TISSUE at 02:11

## 2021-11-10 RX ADMIN — SUMATRIPTAN 6 MG: 6 INJECTION, SOLUTION SUBCUTANEOUS at 02:11

## 2021-11-16 ENCOUNTER — PATIENT MESSAGE (OUTPATIENT)
Dept: PHYSICAL MEDICINE AND REHAB | Facility: CLINIC | Age: 51
End: 2021-11-16
Payer: MEDICARE

## 2021-11-16 DIAGNOSIS — M54.42 CHRONIC MIDLINE LOW BACK PAIN WITH BILATERAL SCIATICA: ICD-10-CM

## 2021-11-16 DIAGNOSIS — M79.7 FIBROMYALGIA SYNDROME: ICD-10-CM

## 2021-11-16 DIAGNOSIS — G89.29 CHRONIC MIDLINE LOW BACK PAIN WITH BILATERAL SCIATICA: ICD-10-CM

## 2021-11-16 DIAGNOSIS — M54.41 CHRONIC MIDLINE LOW BACK PAIN WITH BILATERAL SCIATICA: ICD-10-CM

## 2021-11-17 ENCOUNTER — OFFICE VISIT (OUTPATIENT)
Dept: ORTHOPEDICS | Facility: CLINIC | Age: 51
End: 2021-11-17
Payer: MEDICARE

## 2021-11-17 ENCOUNTER — HOSPITAL ENCOUNTER (OUTPATIENT)
Dept: RADIOLOGY | Facility: HOSPITAL | Age: 51
Discharge: HOME | End: 2021-11-17
Attending: PHYSICIAN ASSISTANT
Payer: MEDICARE

## 2021-11-17 ENCOUNTER — PATIENT MESSAGE (OUTPATIENT)
Dept: PHYSICAL MEDICINE AND REHAB | Facility: CLINIC | Age: 51
End: 2021-11-17
Payer: MEDICARE

## 2021-11-17 ENCOUNTER — PATIENT MESSAGE (OUTPATIENT)
Dept: PAIN MEDICINE | Facility: OTHER | Age: 51
End: 2021-11-17
Payer: MEDICARE

## 2021-11-17 ENCOUNTER — OFFICE VISIT (OUTPATIENT)
Dept: ALLERGY | Facility: CLINIC | Age: 51
End: 2021-11-17
Payer: MEDICARE

## 2021-11-17 ENCOUNTER — PATIENT OUTREACH (OUTPATIENT)
Dept: ADMINISTRATIVE | Facility: HOSPITAL | Age: 51
End: 2021-11-17
Payer: MEDICARE

## 2021-11-17 VITALS — WEIGHT: 293 LBS | BODY MASS INDEX: 50.02 KG/M2 | HEIGHT: 64 IN

## 2021-11-17 VITALS — BODY MASS INDEX: 50.02 KG/M2 | HEART RATE: 79 BPM | OXYGEN SATURATION: 100 % | HEIGHT: 64 IN | WEIGHT: 293 LBS

## 2021-11-17 DIAGNOSIS — G89.29 CHRONIC BILATERAL LOW BACK PAIN WITH BILATERAL SCIATICA: ICD-10-CM

## 2021-11-17 DIAGNOSIS — M54.42 CHRONIC BILATERAL LOW BACK PAIN WITH BILATERAL SCIATICA: ICD-10-CM

## 2021-11-17 DIAGNOSIS — M47.26 OTHER SPONDYLOSIS WITH RADICULOPATHY, LUMBAR REGION: ICD-10-CM

## 2021-11-17 DIAGNOSIS — M54.41 CHRONIC BILATERAL LOW BACK PAIN WITH BILATERAL SCIATICA: ICD-10-CM

## 2021-11-17 DIAGNOSIS — M48.062 SPINAL STENOSIS, LUMBAR REGION WITH NEUROGENIC CLAUDICATION: ICD-10-CM

## 2021-11-17 DIAGNOSIS — M51.36 DDD (DEGENERATIVE DISC DISEASE), LUMBAR: ICD-10-CM

## 2021-11-17 DIAGNOSIS — M43.10 SPONDYLOLISTHESIS, ACQUIRED: Primary | ICD-10-CM

## 2021-11-17 DIAGNOSIS — J30.89 ALLERGIC RHINITIS DUE TO AMERICAN HOUSE DUST MITE: ICD-10-CM

## 2021-11-17 DIAGNOSIS — J30.9 ALLERGIC RHINITIS, UNSPECIFIED SEASONALITY, UNSPECIFIED TRIGGER: Primary | ICD-10-CM

## 2021-11-17 DIAGNOSIS — M54.16 LUMBAR RADICULOPATHY: ICD-10-CM

## 2021-11-17 PROCEDURE — 72148 MRI LUMBAR SPINE W/O DYE: CPT | Mod: 26,HCNC,, | Performed by: RADIOLOGY

## 2021-11-17 PROCEDURE — 4010F PR ACE/ARB THEARPY RXD/TAKEN: ICD-10-PCS | Mod: HCNC,CPTII,S$GLB, | Performed by: ORTHOPAEDIC SURGERY

## 2021-11-17 PROCEDURE — 99999 PR PBB SHADOW E&M-EST. PATIENT-LVL V: ICD-10-PCS | Mod: PBBFAC,HCNC,GC, | Performed by: STUDENT IN AN ORGANIZED HEALTH CARE EDUCATION/TRAINING PROGRAM

## 2021-11-17 PROCEDURE — 99214 PR OFFICE/OUTPT VISIT, EST, LEVL IV, 30-39 MIN: ICD-10-PCS | Mod: HCNC,GC,S$GLB, | Performed by: STUDENT IN AN ORGANIZED HEALTH CARE EDUCATION/TRAINING PROGRAM

## 2021-11-17 PROCEDURE — 99213 PR OFFICE/OUTPT VISIT, EST, LEVL III, 20-29 MIN: ICD-10-PCS | Mod: HCNC,S$GLB,, | Performed by: ORTHOPAEDIC SURGERY

## 2021-11-17 PROCEDURE — 99213 OFFICE O/P EST LOW 20 MIN: CPT | Mod: HCNC,S$GLB,, | Performed by: ORTHOPAEDIC SURGERY

## 2021-11-17 PROCEDURE — 4010F ACE/ARB THERAPY RXD/TAKEN: CPT | Mod: HCNC,CPTII,S$GLB, | Performed by: ORTHOPAEDIC SURGERY

## 2021-11-17 PROCEDURE — 99214 OFFICE O/P EST MOD 30 MIN: CPT | Mod: HCNC,GC,S$GLB, | Performed by: STUDENT IN AN ORGANIZED HEALTH CARE EDUCATION/TRAINING PROGRAM

## 2021-11-17 PROCEDURE — 72148 MRI LUMBAR SPINE WITHOUT CONTRAST: ICD-10-PCS | Mod: 26,HCNC,, | Performed by: RADIOLOGY

## 2021-11-17 PROCEDURE — 99999 PR PBB SHADOW E&M-EST. PATIENT-LVL V: CPT | Mod: PBBFAC,HCNC,GC, | Performed by: STUDENT IN AN ORGANIZED HEALTH CARE EDUCATION/TRAINING PROGRAM

## 2021-11-17 PROCEDURE — 72148 MRI LUMBAR SPINE W/O DYE: CPT | Mod: TC,HCNC

## 2021-11-17 PROCEDURE — 99999 PR PBB SHADOW E&M-EST. PATIENT-LVL IV: CPT | Mod: PBBFAC,HCNC,, | Performed by: ORTHOPAEDIC SURGERY

## 2021-11-17 PROCEDURE — 99999 PR PBB SHADOW E&M-EST. PATIENT-LVL IV: ICD-10-PCS | Mod: PBBFAC,HCNC,, | Performed by: ORTHOPAEDIC SURGERY

## 2021-11-17 RX ORDER — OXYCODONE AND ACETAMINOPHEN 7.5; 325 MG/1; MG/1
1 TABLET ORAL 3 TIMES DAILY PRN
Qty: 90 TABLET | Refills: 0 | Status: SHIPPED | OUTPATIENT
Start: 2021-11-18 | End: 2021-12-13 | Stop reason: SDUPTHER

## 2021-11-17 RX ORDER — DERMATOPHAGOIDES PTERONYSSINUS AND DERMATOPHAGOIDES FARINAE 6; 6 [ARB'U]/1; [ARB'U]/1
1 TABLET SUBLINGUAL DAILY
Qty: 30 TABLET | Refills: 11 | Status: SHIPPED | OUTPATIENT
Start: 2021-11-17 | End: 2022-07-13

## 2021-11-18 ENCOUNTER — OFFICE VISIT (OUTPATIENT)
Dept: FAMILY MEDICINE | Facility: CLINIC | Age: 51
End: 2021-11-18
Payer: MEDICARE

## 2021-11-18 VITALS
WEIGHT: 293 LBS | SYSTOLIC BLOOD PRESSURE: 138 MMHG | HEIGHT: 64 IN | OXYGEN SATURATION: 98 % | RESPIRATION RATE: 18 BRPM | TEMPERATURE: 98 F | DIASTOLIC BLOOD PRESSURE: 82 MMHG | HEART RATE: 62 BPM | BODY MASS INDEX: 50.02 KG/M2

## 2021-11-18 DIAGNOSIS — J32.0 CHRONIC MAXILLARY SINUSITIS: Primary | ICD-10-CM

## 2021-11-18 DIAGNOSIS — I10 ESSENTIAL HYPERTENSION: Chronic | ICD-10-CM

## 2021-11-18 DIAGNOSIS — N18.31 STAGE 3A CHRONIC KIDNEY DISEASE: Chronic | ICD-10-CM

## 2021-11-18 PROCEDURE — 3075F SYST BP GE 130 - 139MM HG: CPT | Mod: HCNC,CPTII,S$GLB, | Performed by: NURSE PRACTITIONER

## 2021-11-18 PROCEDURE — 3008F BODY MASS INDEX DOCD: CPT | Mod: HCNC,CPTII,S$GLB, | Performed by: NURSE PRACTITIONER

## 2021-11-18 PROCEDURE — 99999 PR PBB SHADOW E&M-EST. PATIENT-LVL V: ICD-10-PCS | Mod: PBBFAC,HCNC,, | Performed by: NURSE PRACTITIONER

## 2021-11-18 PROCEDURE — 1160F RVW MEDS BY RX/DR IN RCRD: CPT | Mod: HCNC,CPTII,S$GLB, | Performed by: NURSE PRACTITIONER

## 2021-11-18 PROCEDURE — 4010F ACE/ARB THERAPY RXD/TAKEN: CPT | Mod: HCNC,CPTII,S$GLB, | Performed by: NURSE PRACTITIONER

## 2021-11-18 PROCEDURE — 99214 OFFICE O/P EST MOD 30 MIN: CPT | Mod: HCNC,S$GLB,, | Performed by: NURSE PRACTITIONER

## 2021-11-18 PROCEDURE — 3008F PR BODY MASS INDEX (BMI) DOCUMENTED: ICD-10-PCS | Mod: HCNC,CPTII,S$GLB, | Performed by: NURSE PRACTITIONER

## 2021-11-18 PROCEDURE — 1160F PR REVIEW ALL MEDS BY PRESCRIBER/CLIN PHARMACIST DOCUMENTED: ICD-10-PCS | Mod: HCNC,CPTII,S$GLB, | Performed by: NURSE PRACTITIONER

## 2021-11-18 PROCEDURE — 99999 PR PBB SHADOW E&M-EST. PATIENT-LVL V: CPT | Mod: PBBFAC,HCNC,, | Performed by: NURSE PRACTITIONER

## 2021-11-18 PROCEDURE — 4010F PR ACE/ARB THEARPY RXD/TAKEN: ICD-10-PCS | Mod: HCNC,CPTII,S$GLB, | Performed by: NURSE PRACTITIONER

## 2021-11-18 PROCEDURE — 3075F PR MOST RECENT SYSTOLIC BLOOD PRESS GE 130-139MM HG: ICD-10-PCS | Mod: HCNC,CPTII,S$GLB, | Performed by: NURSE PRACTITIONER

## 2021-11-18 PROCEDURE — 3044F PR MOST RECENT HEMOGLOBIN A1C LEVEL <7.0%: ICD-10-PCS | Mod: HCNC,CPTII,S$GLB, | Performed by: NURSE PRACTITIONER

## 2021-11-18 PROCEDURE — 3079F DIAST BP 80-89 MM HG: CPT | Mod: HCNC,CPTII,S$GLB, | Performed by: NURSE PRACTITIONER

## 2021-11-18 PROCEDURE — 1159F MED LIST DOCD IN RCRD: CPT | Mod: HCNC,CPTII,S$GLB, | Performed by: NURSE PRACTITIONER

## 2021-11-18 PROCEDURE — 3079F PR MOST RECENT DIASTOLIC BLOOD PRESSURE 80-89 MM HG: ICD-10-PCS | Mod: HCNC,CPTII,S$GLB, | Performed by: NURSE PRACTITIONER

## 2021-11-18 PROCEDURE — 1159F PR MEDICATION LIST DOCUMENTED IN MEDICAL RECORD: ICD-10-PCS | Mod: HCNC,CPTII,S$GLB, | Performed by: NURSE PRACTITIONER

## 2021-11-18 PROCEDURE — 99214 PR OFFICE/OUTPT VISIT, EST, LEVL IV, 30-39 MIN: ICD-10-PCS | Mod: HCNC,S$GLB,, | Performed by: NURSE PRACTITIONER

## 2021-11-18 PROCEDURE — 3044F HG A1C LEVEL LT 7.0%: CPT | Mod: HCNC,CPTII,S$GLB, | Performed by: NURSE PRACTITIONER

## 2021-11-18 RX ORDER — DOXYCYCLINE HYCLATE 100 MG
100 TABLET ORAL 2 TIMES DAILY
Qty: 20 TABLET | Refills: 0 | Status: SHIPPED | OUTPATIENT
Start: 2021-11-18 | End: 2022-03-08

## 2021-11-23 ENCOUNTER — PATIENT MESSAGE (OUTPATIENT)
Dept: ADMINISTRATIVE | Facility: HOSPITAL | Age: 51
End: 2021-11-23
Payer: MEDICARE

## 2021-11-23 ENCOUNTER — PATIENT OUTREACH (OUTPATIENT)
Dept: ADMINISTRATIVE | Facility: HOSPITAL | Age: 51
End: 2021-11-23
Payer: MEDICARE

## 2021-11-26 LAB — FUNGUS SPEC CULT: NORMAL

## 2021-11-29 ENCOUNTER — OFFICE VISIT (OUTPATIENT)
Dept: PODIATRY | Facility: CLINIC | Age: 51
End: 2021-11-29
Payer: MEDICARE

## 2021-11-29 VITALS
WEIGHT: 293 LBS | DIASTOLIC BLOOD PRESSURE: 82 MMHG | SYSTOLIC BLOOD PRESSURE: 146 MMHG | HEART RATE: 80 BPM | BODY MASS INDEX: 58.01 KG/M2

## 2021-11-29 DIAGNOSIS — E11.22 TYPE 2 DIABETES MELLITUS WITH STAGE 3A CHRONIC KIDNEY DISEASE, WITH LONG-TERM CURRENT USE OF INSULIN: Primary | ICD-10-CM

## 2021-11-29 DIAGNOSIS — N18.31 TYPE 2 DIABETES MELLITUS WITH STAGE 3A CHRONIC KIDNEY DISEASE, WITH LONG-TERM CURRENT USE OF INSULIN: Primary | ICD-10-CM

## 2021-11-29 DIAGNOSIS — M72.2 PLANTAR FASCIAL FIBROMATOSIS OF LEFT FOOT: ICD-10-CM

## 2021-11-29 DIAGNOSIS — Z79.4 TYPE 2 DIABETES MELLITUS WITH STAGE 3A CHRONIC KIDNEY DISEASE, WITH LONG-TERM CURRENT USE OF INSULIN: Primary | ICD-10-CM

## 2021-11-29 PROCEDURE — 99999 PR PBB SHADOW E&M-EST. PATIENT-LVL IV: CPT | Mod: PBBFAC,HCNC,, | Performed by: PODIATRIST

## 2021-11-29 PROCEDURE — 99214 PR OFFICE/OUTPT VISIT, EST, LEVL IV, 30-39 MIN: ICD-10-PCS | Mod: HCNC,S$GLB,, | Performed by: PODIATRIST

## 2021-11-29 PROCEDURE — 4010F ACE/ARB THERAPY RXD/TAKEN: CPT | Mod: HCNC,CPTII,S$GLB, | Performed by: PODIATRIST

## 2021-11-29 PROCEDURE — 99999 PR PBB SHADOW E&M-EST. PATIENT-LVL IV: ICD-10-PCS | Mod: PBBFAC,HCNC,, | Performed by: PODIATRIST

## 2021-11-29 PROCEDURE — 99214 OFFICE O/P EST MOD 30 MIN: CPT | Mod: HCNC,S$GLB,, | Performed by: PODIATRIST

## 2021-11-29 PROCEDURE — 4010F PR ACE/ARB THEARPY RXD/TAKEN: ICD-10-PCS | Mod: HCNC,CPTII,S$GLB, | Performed by: PODIATRIST

## 2021-11-29 RX ORDER — DICLOFENAC SODIUM 10 MG/G
2 GEL TOPICAL 2 TIMES DAILY
Qty: 100 G | Refills: 2 | Status: SHIPPED | OUTPATIENT
Start: 2021-11-29 | End: 2022-03-14 | Stop reason: SDUPTHER

## 2021-12-03 ENCOUNTER — PATIENT MESSAGE (OUTPATIENT)
Dept: FAMILY MEDICINE | Facility: CLINIC | Age: 51
End: 2021-12-03
Payer: MEDICARE

## 2021-12-04 ENCOUNTER — SPECIALTY PHARMACY (OUTPATIENT)
Dept: PHARMACY | Facility: CLINIC | Age: 51
End: 2021-12-04
Payer: MEDICARE

## 2021-12-07 ENCOUNTER — OFFICE VISIT (OUTPATIENT)
Dept: FAMILY MEDICINE | Facility: CLINIC | Age: 51
End: 2021-12-07
Payer: MEDICARE

## 2021-12-07 ENCOUNTER — PATIENT MESSAGE (OUTPATIENT)
Dept: OTOLARYNGOLOGY | Facility: CLINIC | Age: 51
End: 2021-12-07
Payer: MEDICARE

## 2021-12-07 VITALS
RESPIRATION RATE: 18 BRPM | HEART RATE: 84 BPM | DIASTOLIC BLOOD PRESSURE: 74 MMHG | HEIGHT: 64 IN | BODY MASS INDEX: 50.02 KG/M2 | WEIGHT: 293 LBS | SYSTOLIC BLOOD PRESSURE: 130 MMHG | OXYGEN SATURATION: 98 % | TEMPERATURE: 99 F

## 2021-12-07 DIAGNOSIS — I10 ESSENTIAL HYPERTENSION: Primary | Chronic | ICD-10-CM

## 2021-12-07 DIAGNOSIS — N18.31 STAGE 3A CHRONIC KIDNEY DISEASE: Chronic | ICD-10-CM

## 2021-12-07 DIAGNOSIS — Z12.4 CERVICAL CANCER SCREENING: ICD-10-CM

## 2021-12-07 DIAGNOSIS — J32.0 CHRONIC MAXILLARY SINUSITIS: ICD-10-CM

## 2021-12-07 PROCEDURE — 4010F PR ACE/ARB THEARPY RXD/TAKEN: ICD-10-PCS | Mod: HCNC,CPTII,S$GLB, | Performed by: NURSE PRACTITIONER

## 2021-12-07 PROCEDURE — 4010F ACE/ARB THERAPY RXD/TAKEN: CPT | Mod: HCNC,CPTII,S$GLB, | Performed by: NURSE PRACTITIONER

## 2021-12-07 PROCEDURE — 99999 PR PBB SHADOW E&M-EST. PATIENT-LVL V: CPT | Mod: PBBFAC,HCNC,, | Performed by: NURSE PRACTITIONER

## 2021-12-07 PROCEDURE — 99213 OFFICE O/P EST LOW 20 MIN: CPT | Mod: HCNC,S$GLB,, | Performed by: NURSE PRACTITIONER

## 2021-12-07 PROCEDURE — 99213 PR OFFICE/OUTPT VISIT, EST, LEVL III, 20-29 MIN: ICD-10-PCS | Mod: HCNC,S$GLB,, | Performed by: NURSE PRACTITIONER

## 2021-12-07 PROCEDURE — 99999 PR PBB SHADOW E&M-EST. PATIENT-LVL V: ICD-10-PCS | Mod: PBBFAC,HCNC,, | Performed by: NURSE PRACTITIONER

## 2021-12-10 LAB
ACID FAST MOD KINY STN SPEC: NORMAL
MYCOBACTERIUM SPEC QL CULT: NORMAL

## 2021-12-11 ENCOUNTER — OFFICE VISIT (OUTPATIENT)
Dept: URGENT CARE | Facility: CLINIC | Age: 51
End: 2021-12-11
Payer: MEDICARE

## 2021-12-11 VITALS
TEMPERATURE: 98 F | SYSTOLIC BLOOD PRESSURE: 156 MMHG | OXYGEN SATURATION: 97 % | HEART RATE: 74 BPM | RESPIRATION RATE: 18 BRPM | DIASTOLIC BLOOD PRESSURE: 111 MMHG

## 2021-12-11 DIAGNOSIS — L29.9 ITCHING: ICD-10-CM

## 2021-12-11 DIAGNOSIS — R21 RASH: ICD-10-CM

## 2021-12-11 DIAGNOSIS — R21 RASH/SKIN ERUPTION: Primary | ICD-10-CM

## 2021-12-11 PROCEDURE — 99203 PR OFFICE/OUTPT VISIT, NEW, LEVL III, 30-44 MIN: ICD-10-PCS | Mod: S$GLB,,, | Performed by: NURSE PRACTITIONER

## 2021-12-11 PROCEDURE — 4010F PR ACE/ARB THEARPY RXD/TAKEN: ICD-10-PCS | Mod: CPTII,S$GLB,, | Performed by: NURSE PRACTITIONER

## 2021-12-11 PROCEDURE — 99203 OFFICE O/P NEW LOW 30 MIN: CPT | Mod: S$GLB,,, | Performed by: NURSE PRACTITIONER

## 2021-12-11 PROCEDURE — 4010F ACE/ARB THERAPY RXD/TAKEN: CPT | Mod: CPTII,S$GLB,, | Performed by: NURSE PRACTITIONER

## 2021-12-11 RX ORDER — VALACYCLOVIR HYDROCHLORIDE 1 G/1
1000 TABLET, FILM COATED ORAL 2 TIMES DAILY
Qty: 14 TABLET | Refills: 0 | Status: SHIPPED | OUTPATIENT
Start: 2021-12-11 | End: 2022-04-13

## 2021-12-11 RX ORDER — TRIAMCINOLONE ACETONIDE 1 MG/G
OINTMENT TOPICAL 2 TIMES DAILY PRN
Qty: 1 EACH | Refills: 0 | Status: SHIPPED | OUTPATIENT
Start: 2021-12-11 | End: 2022-03-08

## 2021-12-11 RX ORDER — CETIRIZINE HYDROCHLORIDE 10 MG/1
10 TABLET ORAL DAILY PRN
Qty: 30 TABLET | Refills: 0 | Status: SHIPPED | OUTPATIENT
Start: 2021-12-11 | End: 2022-10-05 | Stop reason: SDUPTHER

## 2021-12-13 DIAGNOSIS — M54.41 CHRONIC MIDLINE LOW BACK PAIN WITH BILATERAL SCIATICA: ICD-10-CM

## 2021-12-13 DIAGNOSIS — M79.7 FIBROMYALGIA SYNDROME: ICD-10-CM

## 2021-12-13 DIAGNOSIS — M54.42 CHRONIC MIDLINE LOW BACK PAIN WITH BILATERAL SCIATICA: ICD-10-CM

## 2021-12-13 DIAGNOSIS — G89.29 CHRONIC MIDLINE LOW BACK PAIN WITH BILATERAL SCIATICA: ICD-10-CM

## 2021-12-13 RX ORDER — OXYCODONE AND ACETAMINOPHEN 7.5; 325 MG/1; MG/1
1 TABLET ORAL 3 TIMES DAILY PRN
Qty: 90 TABLET | Refills: 0 | Status: SHIPPED | OUTPATIENT
Start: 2021-12-18 | End: 2022-01-13 | Stop reason: SDUPTHER

## 2021-12-15 RX ORDER — EPINEPHRINE 0.3 MG/.3ML
1 INJECTION SUBCUTANEOUS ONCE
Qty: 2 EACH | Refills: 1 | Status: SHIPPED | OUTPATIENT
Start: 2021-12-15 | End: 2022-03-08 | Stop reason: SDUPTHER

## 2021-12-16 ENCOUNTER — HOSPITAL ENCOUNTER (OUTPATIENT)
Dept: RADIOLOGY | Facility: HOSPITAL | Age: 51
Discharge: HOME | End: 2021-12-16
Attending: FAMILY MEDICINE
Payer: MEDICARE

## 2021-12-16 DIAGNOSIS — Z12.31 BREAST CANCER SCREENING BY MAMMOGRAM: ICD-10-CM

## 2021-12-16 PROCEDURE — 77067 SCR MAMMO BI INCL CAD: CPT | Mod: 26,HCNC,, | Performed by: RADIOLOGY

## 2021-12-16 PROCEDURE — 77063 MAMMO DIGITAL SCREENING BILAT WITH TOMO: ICD-10-PCS | Mod: 26,HCNC,, | Performed by: RADIOLOGY

## 2021-12-16 PROCEDURE — 77067 MAMMO DIGITAL SCREENING BILAT WITH TOMO: ICD-10-PCS | Mod: 26,HCNC,, | Performed by: RADIOLOGY

## 2021-12-16 PROCEDURE — 77063 BREAST TOMOSYNTHESIS BI: CPT | Mod: 26,HCNC,, | Performed by: RADIOLOGY

## 2021-12-16 PROCEDURE — 77067 SCR MAMMO BI INCL CAD: CPT | Mod: TC,HCNC,PO

## 2021-12-17 ENCOUNTER — TELEPHONE (OUTPATIENT)
Dept: SLEEP MEDICINE | Facility: CLINIC | Age: 51
End: 2021-12-17
Payer: MEDICARE

## 2021-12-20 ENCOUNTER — HOSPITAL ENCOUNTER (OUTPATIENT)
Dept: RADIOLOGY | Facility: OTHER | Age: 51
Discharge: HOME | End: 2021-12-20
Attending: STUDENT IN AN ORGANIZED HEALTH CARE EDUCATION/TRAINING PROGRAM
Payer: MEDICARE

## 2021-12-20 ENCOUNTER — OFFICE VISIT (OUTPATIENT)
Dept: NEUROSURGERY | Facility: CLINIC | Age: 51
End: 2021-12-20
Payer: MEDICARE

## 2021-12-20 ENCOUNTER — OFFICE VISIT (OUTPATIENT)
Dept: PAIN MEDICINE | Facility: CLINIC | Age: 51
End: 2021-12-20
Payer: MEDICARE

## 2021-12-20 VITALS
SYSTOLIC BLOOD PRESSURE: 158 MMHG | TEMPERATURE: 98 F | HEART RATE: 74 BPM | BODY MASS INDEX: 50.02 KG/M2 | WEIGHT: 293 LBS | DIASTOLIC BLOOD PRESSURE: 97 MMHG | HEIGHT: 64 IN

## 2021-12-20 VITALS — SYSTOLIC BLOOD PRESSURE: 138 MMHG | DIASTOLIC BLOOD PRESSURE: 89 MMHG | HEART RATE: 85 BPM

## 2021-12-20 DIAGNOSIS — M54.17 LUMBOSACRAL RADICULOPATHY AT L4: ICD-10-CM

## 2021-12-20 DIAGNOSIS — M51.36 DDD (DEGENERATIVE DISC DISEASE), LUMBAR: ICD-10-CM

## 2021-12-20 DIAGNOSIS — E66.01 MORBID OBESITY WITH BMI OF 60.0-69.9, ADULT: Primary | ICD-10-CM

## 2021-12-20 DIAGNOSIS — M54.6 ACUTE RIGHT-SIDED THORACIC BACK PAIN: Primary | ICD-10-CM

## 2021-12-20 DIAGNOSIS — M54.6 ACUTE BILATERAL THORACIC BACK PAIN: ICD-10-CM

## 2021-12-20 DIAGNOSIS — G89.4 CHRONIC PAIN DISORDER: ICD-10-CM

## 2021-12-20 DIAGNOSIS — M54.6 ACUTE RIGHT-SIDED THORACIC BACK PAIN: ICD-10-CM

## 2021-12-20 DIAGNOSIS — M43.16 SPONDYLOLISTHESIS AT L4-L5 LEVEL: ICD-10-CM

## 2021-12-20 PROCEDURE — 99214 PR OFFICE/OUTPT VISIT, EST, LEVL IV, 30-39 MIN: ICD-10-PCS | Mod: HCNC,S$GLB,, | Performed by: ANESTHESIOLOGY

## 2021-12-20 PROCEDURE — 99999 PR PBB SHADOW E&M-EST. PATIENT-LVL V: CPT | Mod: PBBFAC,HCNC,, | Performed by: ANESTHESIOLOGY

## 2021-12-20 PROCEDURE — 4010F ACE/ARB THERAPY RXD/TAKEN: CPT | Mod: HCNC,CPTII,S$GLB, | Performed by: NEUROLOGICAL SURGERY

## 2021-12-20 PROCEDURE — 72070 X-RAY EXAM THORAC SPINE 2VWS: CPT | Mod: TC,HCNC,FY

## 2021-12-20 PROCEDURE — 4010F PR ACE/ARB THEARPY RXD/TAKEN: ICD-10-PCS | Mod: HCNC,CPTII,S$GLB, | Performed by: ANESTHESIOLOGY

## 2021-12-20 PROCEDURE — 4010F ACE/ARB THERAPY RXD/TAKEN: CPT | Mod: HCNC,CPTII,S$GLB, | Performed by: ANESTHESIOLOGY

## 2021-12-20 PROCEDURE — 99214 OFFICE O/P EST MOD 30 MIN: CPT | Mod: HCNC,S$GLB,, | Performed by: ANESTHESIOLOGY

## 2021-12-20 PROCEDURE — 72070 XR THORACIC SPINE AP LATERAL: ICD-10-PCS | Mod: 26,HCNC,, | Performed by: RADIOLOGY

## 2021-12-20 PROCEDURE — 99999 PR PBB SHADOW E&M-EST. PATIENT-LVL V: ICD-10-PCS | Mod: PBBFAC,HCNC,, | Performed by: ANESTHESIOLOGY

## 2021-12-20 PROCEDURE — 99213 PR OFFICE/OUTPT VISIT, EST, LEVL III, 20-29 MIN: ICD-10-PCS | Mod: HCNC,S$GLB,, | Performed by: NEUROLOGICAL SURGERY

## 2021-12-20 PROCEDURE — 72070 X-RAY EXAM THORAC SPINE 2VWS: CPT | Mod: 26,HCNC,, | Performed by: RADIOLOGY

## 2021-12-20 PROCEDURE — 4010F PR ACE/ARB THEARPY RXD/TAKEN: ICD-10-PCS | Mod: HCNC,CPTII,S$GLB, | Performed by: NEUROLOGICAL SURGERY

## 2021-12-20 PROCEDURE — 99999 PR PBB SHADOW E&M-EST. PATIENT-LVL V: ICD-10-PCS | Mod: PBBFAC,HCNC,, | Performed by: NEUROLOGICAL SURGERY

## 2021-12-20 PROCEDURE — 99213 OFFICE O/P EST LOW 20 MIN: CPT | Mod: HCNC,S$GLB,, | Performed by: NEUROLOGICAL SURGERY

## 2021-12-20 PROCEDURE — 99999 PR PBB SHADOW E&M-EST. PATIENT-LVL V: CPT | Mod: PBBFAC,HCNC,, | Performed by: NEUROLOGICAL SURGERY

## 2021-12-21 ENCOUNTER — SPECIALTY PHARMACY (OUTPATIENT)
Dept: PHARMACY | Facility: CLINIC | Age: 51
End: 2021-12-21
Payer: MEDICARE

## 2021-12-23 ENCOUNTER — CLINICAL SUPPORT (OUTPATIENT)
Dept: SMOKING CESSATION | Facility: CLINIC | Age: 51
End: 2021-12-23
Payer: MEDICARE

## 2021-12-23 DIAGNOSIS — F17.200 NICOTINE DEPENDENCE: Primary | ICD-10-CM

## 2021-12-23 PROCEDURE — 99404 PR PREVENT COUNSEL,INDIV,60 MIN: ICD-10-PCS | Mod: HCNC,S$GLB,,

## 2021-12-23 PROCEDURE — 99999 PR PBB SHADOW E&M-EST. PATIENT-LVL I: CPT | Mod: PBBFAC,HCNC,,

## 2021-12-23 PROCEDURE — 99999 PR PBB SHADOW E&M-EST. PATIENT-LVL I: ICD-10-PCS | Mod: PBBFAC,HCNC,,

## 2021-12-23 PROCEDURE — 99404 PREV MED CNSL INDIV APPRX 60: CPT | Mod: HCNC,S$GLB,,

## 2021-12-23 RX ORDER — IBUPROFEN 200 MG
1 TABLET ORAL DAILY
Qty: 28 PATCH | Refills: 0 | Status: SHIPPED | OUTPATIENT
Start: 2021-12-23 | End: 2022-01-06 | Stop reason: SDUPTHER

## 2021-12-23 RX ORDER — MICONAZOLE NITRATE 2 %
2 CREAM (GRAM) TOPICAL
Qty: 200 EACH | Refills: 0 | Status: SHIPPED | OUTPATIENT
Start: 2021-12-23 | End: 2022-01-20

## 2021-12-27 ENCOUNTER — PES CALL (OUTPATIENT)
Dept: ADMINISTRATIVE | Facility: CLINIC | Age: 51
End: 2021-12-27
Payer: MEDICARE

## 2021-12-28 DIAGNOSIS — Z79.4 TYPE 2 DIABETES MELLITUS WITH STAGE 3A CHRONIC KIDNEY DISEASE, WITH LONG-TERM CURRENT USE OF INSULIN: ICD-10-CM

## 2021-12-28 DIAGNOSIS — E11.22 TYPE 2 DIABETES MELLITUS WITH STAGE 3A CHRONIC KIDNEY DISEASE, WITH LONG-TERM CURRENT USE OF INSULIN: ICD-10-CM

## 2021-12-28 DIAGNOSIS — N18.31 TYPE 2 DIABETES MELLITUS WITH STAGE 3A CHRONIC KIDNEY DISEASE, WITH LONG-TERM CURRENT USE OF INSULIN: ICD-10-CM

## 2021-12-30 ENCOUNTER — IMMUNIZATION (OUTPATIENT)
Dept: OBSTETRICS AND GYNECOLOGY | Facility: CLINIC | Age: 51
End: 2021-12-30
Payer: MEDICARE

## 2021-12-30 DIAGNOSIS — Z23 NEED FOR VACCINATION: Primary | ICD-10-CM

## 2021-12-30 PROCEDURE — 91300 COVID-19, MRNA, LNP-S, PF, 30 MCG/0.3 ML DOSE VACCINE: CPT | Mod: HCNC,PBBFAC | Performed by: FAMILY MEDICINE

## 2021-12-30 PROCEDURE — 0003A COVID-19, MRNA, LNP-S, PF, 30 MCG/0.3 ML DOSE VACCINE: CPT | Mod: HCNC,PBBFAC | Performed by: FAMILY MEDICINE

## 2022-01-04 ENCOUNTER — CLINICAL SUPPORT (OUTPATIENT)
Dept: REHABILITATION | Facility: HOSPITAL | Age: 52
End: 2022-01-04
Attending: NEUROLOGICAL SURGERY
Payer: MEDICARE

## 2022-01-04 DIAGNOSIS — M54.6 ACUTE BILATERAL THORACIC BACK PAIN: ICD-10-CM

## 2022-01-04 DIAGNOSIS — E66.01 MORBID OBESITY WITH BMI OF 60.0-69.9, ADULT: ICD-10-CM

## 2022-01-04 DIAGNOSIS — M54.42 CHRONIC LEFT-SIDED LOW BACK PAIN WITH LEFT-SIDED SCIATICA: ICD-10-CM

## 2022-01-04 DIAGNOSIS — M54.17 LUMBOSACRAL RADICULOPATHY AT L4: ICD-10-CM

## 2022-01-04 DIAGNOSIS — G89.29 CHRONIC LEFT-SIDED LOW BACK PAIN WITH LEFT-SIDED SCIATICA: ICD-10-CM

## 2022-01-04 PROBLEM — M54.40 CHRONIC LEFT-SIDED LOW BACK PAIN WITH SCIATICA: Status: ACTIVE | Noted: 2022-01-04

## 2022-01-04 PROCEDURE — 97110 THERAPEUTIC EXERCISES: CPT | Mod: HCNC | Performed by: PHYSICAL THERAPIST

## 2022-01-04 PROCEDURE — 97163 PT EVAL HIGH COMPLEX 45 MIN: CPT | Mod: HCNC | Performed by: PHYSICAL THERAPIST

## 2022-01-04 NOTE — PLAN OF CARE
[facility]  Physical Therapy Initial Evaluation    Date: 1/4/2022   Name: Yanni Kaiser  [facility] Number: [id number]    Therapy Diagnosis:   Encounter Diagnoses   Name Primary?    Morbid obesity with BMI of 60.0-69.9, adult     Acute bilateral thoracic back pain     Lumbosacral radiculopathy at L4     Chronic left-sided low back pain with left-sided sciatica      Physician: Will Prabhakar MD    Physician Orders: PT Eval and Treat   Medical Diagnosis from Referral:   E66.01,Z68.44 (ICD-10-CM) - Morbid obesity with BMI of 60.0-69.9, adult   M54.6 (ICD-10-CM) - Acute bilateral thoracic back pain   M54.17 (ICD-10-CM) - Lumbosacral radiculopathy at L4       Evaluation Date: 1/4/2022  Authorization Period Expiration: 05/01/2022  Plan of Care Expiration: 3/31/2022  Visit # / Visits authorized: 1/ 1    Time In: 1000  Time Out: 1100  Total Appointment Time (timed & untimed codes): 60 minutes    Precautions: Standard, HBP, Diabetes    Subjective   Date of onset: Chronic, 5 years ago  History of current condition - Yanni reports: Long history of low back pain. Has been in and out of PT in this past which has helped with short term pain reduction. Symptoms started ~5 years ago as central low back pain and has progressed to a left radiculopathy. Pain is into the lateral hip and superior foot. Also has numbness and tingling. Symptoms increase with extended positioning and decrease with movement.     Pain:  Current 5/10, worst 7/10, best 3/10   Location: central low back into LLE  Description: Aching, Burning, Tingling and Numb  Aggravating Factors: extended sitting, sit to stand, sustained positions  Easing Factors: walking    Pts goals: Decrease pain    Medical History:   Past Medical History:   Diagnosis Date    Allergy     Anemia     Anxiety     Asthma     Back pain     Chronic bronchitis     Cigarette smoker     DDD (degenerative disc disease), lumbar 6/9/2020    Depression      Diabetes with neurologic complications     DUB (dysfunctional uterine bleeding) 10/16/2018    GERD (gastroesophageal reflux disease)     High cholesterol     Hyperprolactinemia     Hypertension     Influenza A 2020    Neuromuscular disorder     Obese body habitus     Obesity     Pseudotumor cerebri     Renal manifestation of secondary diabetes mellitus     Respiratory failure     Seizures     Simple endometrial hyperplasia     Sleep apnea     Smoker     Tobacco dependence     Urinary incontinence        Surgical History:   Yanni Kaiser  has a past surgical history that includes Cholecystectomy; Sinus surgery;  section; Breast cyst aspiration; Hysteroscopy with dilation and curettage of uterus (N/A, 10/16/2018); Colonoscopy; Upper gastrointestinal endoscopy; Esophagogastroduodenoscopy (N/A, 2019); Colonoscopy (N/A, 2019); Retinal laser procedure (Left); Esophagogastroduodenoscopy (N/A, 2019); Esophageal manometry with measurement of impedance (N/A, 2019); Plantar fasciotomy (Left, 2019); Foot fracture surgery (Left); Transforaminal epidural injection of steroid (Bilateral, 2020); Epidural steroid injection (N/A, 2020); Injection of anesthetic agent around nerve (Bilateral, 10/23/2020); Esophagogastroduodenoscopy (N/A, 2020); Esophagogastroduodenoscopy (N/A, 4/15/2021); Functional endoscopic sinus surgery (FESS) using computer-assisted navigation (Bilateral, 10/22/2021); Sphenoidectomy (Bilateral, 10/22/2021); and Antrostomy of maxillary sinus at inferior nasal meatus (10/22/2021).    Medications:   Yanni has a current medication list which includes the following prescription(s): albuterol, albuterol, alcohol swabs, odactra, ammonium lactate, azelastine, blood sugar diagnostic, blood-glucose meter, budesonide-formoterol 160-4.5 mcg, butalbital-acetaminophen-caffeine -40 mg, cetirizine, diclofenac sodium, diclofenac  sodium, doxycycline, epinephrine, jardiance, lancets, lancets, levocetirizine, lidocaine, lidocaine hcl 2%, losartan, lubiprostone, montelukast, movantik, nicotine, nicotine (polacrilex), ondansetron, ondansetron, oxycodone-acetaminophen, ozempic, pantoprazole, pregabalin, quetiapine, sodium chloride, triamcinolone acetonide 0.1%, trokendi xr, valacyclovir, and venlafaxine, and the following Facility-Administered Medications: acetaminophen, albuterol, diphenhydramine, epinephrine, methylprednisolone sodium succinate, ondansetron, sodium chloride 0.9% 500 mL flush bag, and sodium chloride 0.9%.    Allergies:   Review of patient's allergies indicates:   Allergen Reactions    Gabapentin Other (See Comments)     Makes her twitch        Imaging, MRI studies: Degenerative changes of the lumbar spine resulting in moderate spinal canal stenosis at L4-L5.  Multilevel neural foraminal narrowing, as above.  Overall appearance is similar to prior exam.    Prior Therapy: Yes  Exercise Routine/Sports Participation: No  Occupation: Not working right now  Prior Level of Function: Full  Current Level of Function: Pain with ADLs    Objective     Observation: Decreased stance time on LLE with ambulation    Posture:  Increased lumbar lordosis    Functional Movements:   Gait: L antalgic gait pattern    Lumbar Range of Motion:    Limitations Pain   Flexion 75   +        Extension 25   +        Left Side Bending 50 +        Right Side Bending 50 +            Lower Extremity Strength  Right LE  Left LE    Quadriceps: 4/5 Quadriceps: 3/5   Hamstrings: 4/5 Hamstrings: 4-/5   Iliospoas: 4-/5 Iliospoas: 3+/5   Hip extension:  3-/5 Hip extension: 3-/5   PGM: 3-/5 PGM: 3-/5   Hip ER:  3-/5 Hip ER: 3-/5   Hip IR: 4-/5 Hip IR: 4-/5       Neural Tension Testing:   Slump:+  SLR: +      Limitation/Restriction for FOTO lumbar Survey    Therapist reviewed FOTO scores for Yanni Kaiser on 1/4/2022.   FOTO documents entered into EPIC - see Media  section.    Limitation Score: 72%         TREATMENT   Treatment Time In: 1030  Treatment Time Out: 1045  Total Treatment time (time-based codes) separate from Evaluation: 15 minutes    Yanni received therapeutic exercises to develop strength, endurance, ROM and flexibility for 15 minutes including:  Education on walking program  Slump slider 25x  Seated clamshell with OTB  Seated ball squeeze    Education provided:   Diagnosis and prognosis    Written Home Exercises Provided: yes.  Exercises were reviewed and Yanni was able to demonstrate them prior to the end of the session.  Yanni demonstrated good  understanding of the education provided.     See EMR under Patient Instructions for exercises provided 1/4/2022.    Assessment   Yanni is a 51 y.o. female referred to outpatient Physical Therapy with a medical diagnosis of left sided lumbar radicluopathy. Pt presents with significant motion limitations, strength deficits, and an inability to find comfortable positions in seated and supine. Irritability is high in regards to positioning right now. As such she would likely benefit from aquatic treatment to focus on gross strength and mobility. Signs and symptoms indicate lumbar radiculopathy. She also states she can initiate a walking program and return to the gym for general exercise. Long term rehabilitation plan should involve lifestyle modifications such as smoking cessation, diet, 30 minutes of cardiorespiratory endurance training dailiy, weight loss, pain science, and gross strengthening.    Pt prognosis is Guarded.   Pt will benefit from skilled outpatient Physical Therapy to address the deficits stated above and in the chart below, provide pt/family education, and to maximize pt's level of independence.     Plan of care discussed with patient: Yes  Pt's spiritual, cultural and educational needs considered and patient is agreeable to the plan of care and goals as stated below:     Anticipated Barriers  for therapy: chronicity, body habitus, irritability    Medical Necessity is demonstrated by the following  History  Co-morbidities and personal factors that may impact the plan of care Co-morbidities:   advanced age, high BMI and HTN    Personal Factors:   lifestyle     high   Examination  Body Structures and Functions, activity limitations and participation restrictions that may impact the plan of care Body Regions:   back  lower extremities    Body Systems:    gross symmetry  ROM  strength  gross coordinated movement  gait    Participation Restrictions:   NA    Activity limitations:   Learning and applying knowledge  No deficits    General Tasks and Commands  {No deficits    Communication  No deficits    Mobility  walking    Self care  No deficits    Domestic Life  No deficits    Interactions/Relationships  No deficits    Life Areas  NA    Community and Social Life  No deficits         high   Clinical Presentation unstable clinical presentation with unpredictable characteristics high   Decision Making/ Complexity Score: high     GOALS: Short Term Goals:  6 weeks  1.Report decreased low back pain  < / =  3/10  to increase tolerance for exercise  2. Increase ROM by 15 degrees where limited in order to perform ADLs without difficulty.  3. Increase strength by 1/3 MMT grade in gluteal strength  to increase tolerance for ADL and work activities.  4. Pt to tolerate HEP to improve ROM and independence with ADL's    Long Term Goals: 12 to 24 weeks  1.Report decreased low back  pain < / = 2/10  to increase tolerance for ADLs  2.Patient goal: Reduce pain  3.Increase strength to 4+/5 in  Gluteal muscles  to increase tolerance for ADL and work activities.    Plan     Outpatient Physical Therapy 2 times weekly for 6 weeks to include the following interventions: manual therapy, therapeutic exercise, therapeutic activities, and neuromuscular re-education. Will initiate treatment in aquatic therapy and progress to land as  irritability decreases. Patient to complete walking program and generic exercise independently.    Son Palumbo, PT, DPT

## 2022-01-05 ENCOUNTER — CLINICAL SUPPORT (OUTPATIENT)
Dept: REHABILITATION | Facility: HOSPITAL | Age: 52
End: 2022-01-05
Attending: NEUROLOGICAL SURGERY
Payer: MEDICARE

## 2022-01-05 DIAGNOSIS — M54.42 CHRONIC LEFT-SIDED LOW BACK PAIN WITH LEFT-SIDED SCIATICA: ICD-10-CM

## 2022-01-05 DIAGNOSIS — G89.29 CHRONIC LEFT-SIDED LOW BACK PAIN WITH LEFT-SIDED SCIATICA: ICD-10-CM

## 2022-01-05 PROCEDURE — 97113 AQUATIC THERAPY/EXERCISES: CPT | Mod: HCNC

## 2022-01-05 RX ORDER — SEMAGLUTIDE 1.34 MG/ML
1 INJECTION, SOLUTION SUBCUTANEOUS
Qty: 3 PEN | Refills: 0 | Status: SHIPPED | OUTPATIENT
Start: 2022-01-05 | End: 2022-04-06 | Stop reason: SDUPTHER

## 2022-01-05 NOTE — PROGRESS NOTES
[facility]   Physical Therapy Treatment Note     Name: Yanni Scwhartz Lemon  Clinic Number: [id number]    Therapy Diagnosis:   Encounter Diagnosis   Name Primary?    Chronic left-sided low back pain with left-sided sciatica      Physician: Will Prabhakar MD    Visit Date: 1/5/2022      Physician: Will Prabhakar MD     Physician Orders: PT Eval and Treat   Medical Diagnosis from Referral:   E66.01,Z68.44 (ICD-10-CM) - Morbid obesity with BMI of 60.0-69.9, adult   M54.6 (ICD-10-CM) - Acute bilateral thoracic back pain   M54.17 (ICD-10-CM) - Lumbosacral radiculopathy at L4         Evaluation Date: 1/4/2022  Authorization Period Expiration: 05/01/2022  Plan of Care Expiration: 3/31/2022  Visit # / Visits authorized: 1/20 + Eval  PTA Visit #: 0/6     Time In: 9:30 am   Time Out: 10:15 am   Total Billable Time: 30 minutes    SUBJECTIVE     Pt reports: she is sore today and a little fearful of getting in the water but ready to give it a try.     She was compliant with home exercise program.  Response to previous treatment: first pool visit  Functional change: initiated aquatic PT    Pain: 8/10  Location: bilateral back      OBJECTIVE     Objective Measures updated at progress report unless specified.     Treatment     Yanni received aquatic therapeutic exercises to develop strength, endurance, ROM, flexibility, posture, and core stabilization for 45 minutes including:    FUNCTIONAL MOBILITY TRAINING x 2 laps each at beginning and 1 lap each at end of session   With hand hold on wall for safety and comfort   Walk forward/backward/lateral    STRETCHES 2 x 30sec  Add next     LE EX x 20 with B hand hold on wall  Squat  Heel raise with gluteal set  Hip abduction/adduction  Hip flex/ext  LAQ    UE EX/CORE  x 20  Shoulder flex/ext TA activation paddles open  Shoulder horizontal abd/add TA activation paddles open  Mini squat with push/pull red kickboard    ENDURANCE  Bicycle in // bars  5'  LTR x 2' in // bars      Patient Education and Home Exercises     Home Exercises Provided and Patient Education Provided     Education provided:   Role of aquatic therapy  Hydration post therapy    Written Home Exercises Provided: Patient instructed to cont prior HEP. Exercises were reviewed and Yanni was able to demonstrate them prior to the end of the session.  Yanni demonstrated good  understanding of the education provided. See EMR under Patient Instructions for exercises provided during therapy sessions    ASSESSMENT     The patient initially was hesitant and not comfortable in the water. She required hand hold on the wall for support and comfort. Throughout the session she gained confidence and comfort in the ater. She tolerated all exercises well today.     Yanni Is progressing well towards her goals.   Pt prognosis is Good.     Pt will continue to benefit from skilled outpatient physical therapy to address the deficits listed in the problem list box on initial evaluation, provide pt/family education and to maximize pt's level of independence in the home and community environment.     Pt's spiritual, cultural and educational needs considered and pt agreeable to plan of care and goals.     Anticipated barriers to physical therapy: none at this time    Goals:     1.Report decreased low back pain  < / =  3/10  to increase tolerance for exercise  2. Increase ROM by 15 degrees where limited in order to perform ADLs without difficulty.  3. Increase strength by 1/3 MMT grade in gluteal strength  to increase tolerance for ADL and work activities.  4. Pt to tolerate HEP to improve ROM and independence with ADL's     Long Term Goals: 12 to 24 weeks  1.Report decreased low back  pain < / = 2/10  to increase tolerance for ADLs  2.Patient goal: Reduce pain  3.Increase strength to 4+/5 in  Gluteal muscles  to increase tolerance for ADL and work activities.    PLAN     Progress POC as tolerated by the  patient.    Arti Rodriguez, PT

## 2022-01-06 ENCOUNTER — CLINICAL SUPPORT (OUTPATIENT)
Dept: SMOKING CESSATION | Facility: CLINIC | Age: 52
End: 2022-01-06
Payer: COMMERCIAL

## 2022-01-06 DIAGNOSIS — F17.200 NICOTINE DEPENDENCE: Primary | ICD-10-CM

## 2022-01-06 PROCEDURE — 99402 PREV MED CNSL INDIV APPRX 30: CPT | Mod: S$GLB,,,

## 2022-01-06 PROCEDURE — 99402 PR PREVENT COUNSEL,INDIV,30 MIN: ICD-10-PCS | Mod: S$GLB,,,

## 2022-01-06 RX ORDER — IBUPROFEN 200 MG
1 TABLET ORAL DAILY
Qty: 28 PATCH | Refills: 0 | Status: SHIPPED | OUTPATIENT
Start: 2022-01-06 | End: 2022-01-20

## 2022-01-06 NOTE — PROGRESS NOTES
[facility]   Physical Therapy Treatment Note     Name: Yanni Schwartz Lemon  Clinic Number: [id number]    Therapy Diagnosis:   Encounter Diagnosis   Name Primary?    Chronic left-sided low back pain with left-sided sciatica      Physician: Will Prabhakar MD    Visit Date: 1/7/2022      Physician: Will Prabhakar MD     Physician Orders: PT Eval and Treat   Medical Diagnosis from Referral:   E66.01,Z68.44 (ICD-10-CM) - Morbid obesity with BMI of 60.0-69.9, adult   M54.6 (ICD-10-CM) - Acute bilateral thoracic back pain   M54.17 (ICD-10-CM) - Lumbosacral radiculopathy at L4         Evaluation Date: 1/4/2022  Authorization Period Expiration: 05/01/2022  Plan of Care Expiration: 3/31/2022  Visit # / Visits authorized: 2/20 + Eval  PTA Visit #: 1/6     Time In: 9:30 am   Time Out: 10:30 am   Total Billable Time: 30 minutes    SUBJECTIVE     Pt reports: she felt tired after the first visit but no increased soreness, reports pain with colder weather    She was compliant with home exercise program.  Response to previous treatment: fatigue  Functional change: none at this time    Pain: 9/10  Location: bilateral back      OBJECTIVE     Objective Measures updated at progress report unless specified.     Treatment     Yanni received aquatic therapeutic exercises to develop strength, endurance, ROM, flexibility, posture, and core stabilization for 53 minutes including:    FUNCTIONAL MOBILITY TRAINING x 2 laps each at beginning and 1 lap each at end of session   With hand hold on wall for safety and comfort   Walk forward/backward/lateral    STRETCHES 2 x 30sec  Calf  hamstring    LE EX x 20  Squat  Heel raise with gluteal set  Hip abduction/adduction with B yellow db  Hip flex/ext with B yellow db  LAQ  Hamstring curl with yellow db x1    UE EX/CORE  x 20  Shoulder flex/ext TA activation paddles closed  Shoulder horizontal abd/add TA activation paddles closed  Mini squat with push/pull red  kickboard with back at wall    ENDURANCE  Bicycle in // bars 5'  LTR x 2' in // bars  DKTC in // bars x20      Patient Education and Home Exercises     Home Exercises Provided and Patient Education Provided     Education provided:   Role of aquatic therapy  Hydration post therapy    Written Home Exercises Provided: Patient instructed to cont prior HEP. Exercises were reviewed and Yanni was able to demonstrate them prior to the end of the session.  Yanni demonstrated good  understanding of the education provided. See EMR under Patient Instructions for exercises provided during therapy sessions    ASSESSMENT     Patient comfortable on 2nd pool session upon entering water today. Good tolerance to progress treatment today with decreasing UE A and fair balance noted with dynamic movements. Required cuing for core control with UE movements. Plan to progress per patient tolerance.  Yanni Is progressing well towards her goals.   Pt prognosis is Good.     Pt will continue to benefit from skilled outpatient physical therapy to address the deficits listed in the problem list box on initial evaluation, provide pt/family education and to maximize pt's level of independence in the home and community environment.     Pt's spiritual, cultural and educational needs considered and pt agreeable to plan of care and goals.     Anticipated barriers to physical therapy: none at this time    Goals:     1.Report decreased low back pain  < / =  3/10  to increase tolerance for exercise  2. Increase ROM by 15 degrees where limited in order to perform ADLs without difficulty.  3. Increase strength by 1/3 MMT grade in gluteal strength  to increase tolerance for ADL and work activities.  4. Pt to tolerate HEP to improve ROM and independence with ADL's     Long Term Goals: 12 to 24 weeks  1.Report decreased low back  pain < / = 2/10  to increase tolerance for ADLs  2.Patient goal: Reduce pain  3.Increase strength to 4+/5 in  Gluteal  muscles  to increase tolerance for ADL and work activities.    PLAN     Progress POC as tolerated by the patient.    LD NEGRETE, PTA

## 2022-01-06 NOTE — PROGRESS NOTES
Individual Follow-Up Form    1/6/2022    Quit Date: 12/30/2021    Clinical Status of Patient: Outpatient    Length of Service: 30 minutes    Continuing Medication: yes  Patches or Nicotine gum    Other Medications: None      Target Symptoms: Withdrawal and medication side effects. The following were  rated moderate (3) to severe (4) on TCRS:  · Moderate (3): Crave and Desire   · Severe (4): None     Comments: Patient presented to clinic for follow-up visit, name and date of birth verified as two patient identifiers.   Patient reported she is tobacco free as of 12/30/2021 and she is still using 21 mg nicotine patch in conjunction with 2 mg nicotine gum without any negative side effects reported at this time.  Counselor congratulated patient on becoming tobacco free.  Counselor also reviewed packet 2 with patient outlining the benefits on ones health upon acheiving  tobacco free status.  Follow-up visit scheduled in two weeks.  Counselor will remain available should any further needs arise.      Diagnosis: F17.200    Next Visit: 2 weeks     1/13/2022 at 1446-Chart was reopened to addend charge.     4/11/2022 at 1147-chart was reopened to addend charge.

## 2022-01-07 ENCOUNTER — CLINICAL SUPPORT (OUTPATIENT)
Dept: REHABILITATION | Facility: HOSPITAL | Age: 52
End: 2022-01-07
Attending: NEUROLOGICAL SURGERY
Payer: MEDICARE

## 2022-01-07 DIAGNOSIS — G89.29 CHRONIC LEFT-SIDED LOW BACK PAIN WITH LEFT-SIDED SCIATICA: ICD-10-CM

## 2022-01-07 DIAGNOSIS — M54.42 CHRONIC LEFT-SIDED LOW BACK PAIN WITH LEFT-SIDED SCIATICA: ICD-10-CM

## 2022-01-07 PROCEDURE — 97113 AQUATIC THERAPY/EXERCISES: CPT | Mod: HCNC,CQ

## 2022-01-12 ENCOUNTER — CLINICAL SUPPORT (OUTPATIENT)
Dept: REHABILITATION | Facility: HOSPITAL | Age: 52
End: 2022-01-12
Attending: NEUROLOGICAL SURGERY
Payer: MEDICARE

## 2022-01-12 ENCOUNTER — OFFICE VISIT (OUTPATIENT)
Dept: OTOLARYNGOLOGY | Facility: CLINIC | Age: 52
End: 2022-01-12
Payer: MEDICARE

## 2022-01-12 VITALS — SYSTOLIC BLOOD PRESSURE: 161 MMHG | TEMPERATURE: 97 F | HEART RATE: 92 BPM | DIASTOLIC BLOOD PRESSURE: 91 MMHG

## 2022-01-12 DIAGNOSIS — G89.29 CHRONIC LEFT-SIDED LOW BACK PAIN WITH LEFT-SIDED SCIATICA: ICD-10-CM

## 2022-01-12 DIAGNOSIS — M54.42 CHRONIC LEFT-SIDED LOW BACK PAIN WITH LEFT-SIDED SCIATICA: ICD-10-CM

## 2022-01-12 DIAGNOSIS — Z98.890 S/P FESS (FUNCTIONAL ENDOSCOPIC SINUS SURGERY): Primary | ICD-10-CM

## 2022-01-12 PROCEDURE — 3080F PR MOST RECENT DIASTOLIC BLOOD PRESSURE >= 90 MM HG: ICD-10-PCS | Mod: CPTII,S$GLB,, | Performed by: OTOLARYNGOLOGY

## 2022-01-12 PROCEDURE — 3080F DIAST BP >= 90 MM HG: CPT | Mod: CPTII,S$GLB,, | Performed by: OTOLARYNGOLOGY

## 2022-01-12 PROCEDURE — 3077F SYST BP >= 140 MM HG: CPT | Mod: CPTII,S$GLB,, | Performed by: OTOLARYNGOLOGY

## 2022-01-12 PROCEDURE — 1160F RVW MEDS BY RX/DR IN RCRD: CPT | Mod: CPTII,S$GLB,, | Performed by: OTOLARYNGOLOGY

## 2022-01-12 PROCEDURE — 99024 PR POST-OP FOLLOW-UP VISIT: ICD-10-PCS | Mod: S$GLB,,, | Performed by: OTOLARYNGOLOGY

## 2022-01-12 PROCEDURE — 1160F PR REVIEW ALL MEDS BY PRESCRIBER/CLIN PHARMACIST DOCUMENTED: ICD-10-PCS | Mod: CPTII,S$GLB,, | Performed by: OTOLARYNGOLOGY

## 2022-01-12 PROCEDURE — 1159F MED LIST DOCD IN RCRD: CPT | Mod: CPTII,S$GLB,, | Performed by: OTOLARYNGOLOGY

## 2022-01-12 PROCEDURE — 97113 AQUATIC THERAPY/EXERCISES: CPT | Mod: HCNC,CQ

## 2022-01-12 PROCEDURE — 3077F PR MOST RECENT SYSTOLIC BLOOD PRESSURE >= 140 MM HG: ICD-10-PCS | Mod: CPTII,S$GLB,, | Performed by: OTOLARYNGOLOGY

## 2022-01-12 PROCEDURE — 1159F PR MEDICATION LIST DOCUMENTED IN MEDICAL RECORD: ICD-10-PCS | Mod: CPTII,S$GLB,, | Performed by: OTOLARYNGOLOGY

## 2022-01-12 PROCEDURE — 99024 POSTOP FOLLOW-UP VISIT: CPT | Mod: S$GLB,,, | Performed by: OTOLARYNGOLOGY

## 2022-01-12 NOTE — PROGRESS NOTES
"    [facility]   Physical Therapy Treatment Note     Name: Yanni Kaiser  Clinic Number: [id number]    Therapy Diagnosis:   Encounter Diagnosis   Name Primary?    Chronic left-sided low back pain with left-sided sciatica      Physician: Will Prabhakar MD    Visit Date: 1/12/2022      Physician: Will Prabhakar MD     Physician Orders: PT Eval and Treat   Medical Diagnosis from Referral:   E66.01,Z68.44 (ICD-10-CM) - Morbid obesity with BMI of 60.0-69.9, adult   M54.6 (ICD-10-CM) - Acute bilateral thoracic back pain   M54.17 (ICD-10-CM) - Lumbosacral radiculopathy at L4         Evaluation Date: 1/4/2022  Authorization Period Expiration: 05/01/2022  Plan of Care Expiration: 3/31/2022  Visit # / Visits authorized: 3/20 + Eval  PTA Visit #: 2/6     Time In: 1:00 pm   Time Out: 2:00 pm   Total Billable Time: 30 minutes    SUBJECTIVE     Pt reports: she is feeling good today and is feeling more comfortable in the pool    She was compliant with home exercise program.  Response to previous treatment: fatigue  Functional change: none at this time    Pain: 9/10  Location: bilateral back      OBJECTIVE     Objective Measures updated at progress report unless specified.     Treatment     Yanni received aquatic therapeutic exercises to develop strength, endurance, ROM, flexibility, posture, and core stabilization for 53 minutes including:    FUNCTIONAL MOBILITY TRAINING x 2 laps each at beginning and 1 lap each at end of session   With hand hold on wall for safety and comfort   Walk forward/backward/lateral    STRETCHES 2 x 30sec  Calf  hamstring    LE EX x 20 (float cuffs NEXT)  Squat  Heel raise with gluteal set  Hip abduction/adduction with B yellow db  Hip flex/ext with B yellow db  LAQ  Hamstring curl with yellow db x1  Tandem bal in // bars 2x20" ea  SL bal in // bars 2x20" ea    UE EX/CORE  x 20  Shoulder flex/ext TA activation paddles closed  Shoulder horizontal abd/add TA " activation paddles closed  Mini squat with push/pull red kickboard with back at wall    ENDURANCE  Bicycle in // bars 5'  LTR x 2' in // bars  DKTC in // bars x20      Patient Education and Home Exercises     Home Exercises Provided and Patient Education Provided     Education provided:   Role of aquatic therapy  Hydration post therapy    Written Home Exercises Provided: Patient instructed to cont prior HEP. Exercises were reviewed and Yanni was able to demonstrate them prior to the end of the session.  Yanni demonstrated good  understanding of the education provided. See EMR under Patient Instructions for exercises provided during therapy sessions    ASSESSMENT     Patient tolerated progressed balance training with fair balance today, plan to progress per patient tolerance.   Yanni Is progressing well towards her goals.   Pt prognosis is Good.     Pt will continue to benefit from skilled outpatient physical therapy to address the deficits listed in the problem list box on initial evaluation, provide pt/family education and to maximize pt's level of independence in the home and community environment.     Pt's spiritual, cultural and educational needs considered and pt agreeable to plan of care and goals.     Anticipated barriers to physical therapy: none at this time    Goals:     1.Report decreased low back pain  < / =  3/10  to increase tolerance for exercise  2. Increase ROM by 15 degrees where limited in order to perform ADLs without difficulty.  3. Increase strength by 1/3 MMT grade in gluteal strength  to increase tolerance for ADL and work activities.  4. Pt to tolerate HEP to improve ROM and independence with ADL's     Long Term Goals: 12 to 24 weeks  1.Report decreased low back  pain < / = 2/10  to increase tolerance for ADLs  2.Patient goal: Reduce pain  3.Increase strength to 4+/5 in  Gluteal muscles  to increase tolerance for ADL and work activities.    PLAN     Progress POC as tolerated by the  patient.    LD NEGRETE, PTA

## 2022-01-12 NOTE — PROGRESS NOTES
Three months S/P [employer] FESS with bilateral maxillaries and sphenoids,SMR turbs.  She has been maintaining her blood pressure medication and her severe headaches have resolved.  She is having some mild ethmoid sinus region pressure.  Her exam shows her septum to be straight, ostia clean and airway open.   She completed her 1st 30 days of compound sinus rinses but did not realize she was to continue rinsing daily with budesonide compound rinse.  Plan:  We will order her budesonide rinses  Saline spray/gel  RTC  9 months for sinus follow-up or p.r.n. sooner

## 2022-01-14 ENCOUNTER — CLINICAL SUPPORT (OUTPATIENT)
Dept: REHABILITATION | Facility: HOSPITAL | Age: 52
End: 2022-01-14
Attending: NEUROLOGICAL SURGERY
Payer: MEDICARE

## 2022-01-14 DIAGNOSIS — M54.42 CHRONIC LEFT-SIDED LOW BACK PAIN WITH LEFT-SIDED SCIATICA: ICD-10-CM

## 2022-01-14 DIAGNOSIS — G89.29 CHRONIC LEFT-SIDED LOW BACK PAIN WITH LEFT-SIDED SCIATICA: ICD-10-CM

## 2022-01-14 PROCEDURE — 97113 AQUATIC THERAPY/EXERCISES: CPT | Mod: HCNC,CQ

## 2022-01-14 NOTE — PROGRESS NOTES
"    [facility]   Physical Therapy Treatment Note     Name: Yanni Kaiser  Clinic Number: [id number]    Therapy Diagnosis:   Encounter Diagnosis   Name Primary?    Chronic left-sided low back pain with left-sided sciatica      Physician: Will Prabhakar MD    Visit Date: 1/14/2022      Physician: Will Prabhakar MD     Physician Orders: PT Eval and Treat   Medical Diagnosis from Referral:   E66.01,Z68.44 (ICD-10-CM) - Morbid obesity with BMI of 60.0-69.9, adult   M54.6 (ICD-10-CM) - Acute bilateral thoracic back pain   M54.17 (ICD-10-CM) - Lumbosacral radiculopathy at L4         Evaluation Date: 1/4/2022  Authorization Period Expiration: 05/01/2022  Plan of Care Expiration: 3/31/2022  Visit # / Visits authorized: 4/20 + Eval  PTA Visit #: 3/6     Time In: 1:30 pm   Time Out: 2:30 pm   Total Billable Time: 45 minutes    SUBJECTIVE     Pt reports: she is good today, she reports is tired after pool treatments and recovers by the next day    She was compliant with home exercise program.  Response to previous treatment: fatigue  Functional change: none at this time    Pain: 8/10  Location: bilateral back      OBJECTIVE     Objective Measures updated at progress report unless specified.     Treatment     Yanni received aquatic therapeutic exercises to develop strength, endurance, ROM, flexibility, posture, and core stabilization for 53 minutes including:    FUNCTIONAL MOBILITY TRAINING x 2 laps each at beginning and 1 lap each at end of session  Walk forward/backward/lateral    STRETCHES 2 x 30sec  Calf  hamstring    LE EX x 20 float cuffs  Squat  Heel raise with gluteal set  Hip abduction/adduction with B yellow db  Hip flex/ext with B yellow db  LAQ  Leg press aqua noodle/blue tubing  Tandem bal in // bars 2x20" ea  SL bal in // bars 2x20" ea  Tandem walk in // bars 2 laps  Wobble board 1' in // bars  Tripod clams    UE EX/CORE  x 20  Shoulder flex/ext TA activation paddles " closed  Shoulder horizontal abd/add TA activation paddles closed  Mini squat with push/pull red kickboard with back at wall    ENDURANCE  Bicycle in // bars 5'  LTR x 2' in // bars  DKTC in // bars x20      Patient Education and Home Exercises     Home Exercises Provided and Patient Education Provided     Education provided:   Role of aquatic therapy  Hydration post therapy    Written Home Exercises Provided: Patient instructed to cont prior HEP. Exercises were reviewed and Yanni was able to demonstrate them prior to the end of the session.  Yanni demonstrated good  understanding of the education provided. See EMR under Patient Instructions for exercises provided during therapy sessions    ASSESSMENT     Patient tolerated progressed strength and balance training well today with no reports of increased pain. Decreasing A required with balance training with fair to good balance noted.  Yanni Is progressing well towards her goals.   Pt prognosis is Good.     Pt will continue to benefit from skilled outpatient physical therapy to address the deficits listed in the problem list box on initial evaluation, provide pt/family education and to maximize pt's level of independence in the home and community environment.     Pt's spiritual, cultural and educational needs considered and pt agreeable to plan of care and goals.     Anticipated barriers to physical therapy: none at this time    Goals:     1.Report decreased low back pain  < / =  3/10  to increase tolerance for exercise  2. Increase ROM by 15 degrees where limited in order to perform ADLs without difficulty.  3. Increase strength by 1/3 MMT grade in gluteal strength  to increase tolerance for ADL and work activities.  4. Pt to tolerate HEP to improve ROM and independence with ADL's     Long Term Goals: 12 to 24 weeks  1.Report decreased low back  pain < / = 2/10  to increase tolerance for ADLs  2.Patient goal: Reduce pain  3.Increase strength to 4+/5 in   Gluteal muscles  to increase tolerance for ADL and work activities.    PLAN     Progress POC as tolerated by the patient.    LD NEGRETE, PTA

## 2022-01-18 ENCOUNTER — OFFICE VISIT (OUTPATIENT)
Dept: PAIN MEDICINE | Facility: CLINIC | Age: 52
End: 2022-01-18
Payer: MEDICARE

## 2022-01-18 VITALS
BODY MASS INDEX: 50.02 KG/M2 | DIASTOLIC BLOOD PRESSURE: 100 MMHG | SYSTOLIC BLOOD PRESSURE: 158 MMHG | WEIGHT: 293 LBS | HEART RATE: 91 BPM | HEIGHT: 64 IN | RESPIRATION RATE: 17 BRPM

## 2022-01-18 DIAGNOSIS — G89.4 CHRONIC PAIN SYNDROME: ICD-10-CM

## 2022-01-18 DIAGNOSIS — M54.16 LUMBAR RADICULOPATHY: Primary | ICD-10-CM

## 2022-01-18 DIAGNOSIS — N39.41 URGE INCONTINENCE: ICD-10-CM

## 2022-01-18 DIAGNOSIS — Z79.4 TYPE 2 DIABETES MELLITUS WITH STAGE 3A CHRONIC KIDNEY DISEASE, WITH LONG-TERM CURRENT USE OF INSULIN: ICD-10-CM

## 2022-01-18 DIAGNOSIS — N18.31 TYPE 2 DIABETES MELLITUS WITH STAGE 3A CHRONIC KIDNEY DISEASE, WITH LONG-TERM CURRENT USE OF INSULIN: ICD-10-CM

## 2022-01-18 DIAGNOSIS — E66.9 OBESITY, UNSPECIFIED CLASSIFICATION, UNSPECIFIED OBESITY TYPE, UNSPECIFIED WHETHER SERIOUS COMORBIDITY PRESENT: ICD-10-CM

## 2022-01-18 DIAGNOSIS — M47.816 LUMBAR SPONDYLOSIS: ICD-10-CM

## 2022-01-18 DIAGNOSIS — E11.22 TYPE 2 DIABETES MELLITUS WITH STAGE 3A CHRONIC KIDNEY DISEASE, WITH LONG-TERM CURRENT USE OF INSULIN: ICD-10-CM

## 2022-01-18 DIAGNOSIS — M48.062 SPINAL STENOSIS OF LUMBAR REGION WITH NEUROGENIC CLAUDICATION: ICD-10-CM

## 2022-01-18 PROCEDURE — 99999 PR PBB SHADOW E&M-EST. PATIENT-LVL V: CPT | Mod: PBBFAC,HCNC,, | Performed by: ANESTHESIOLOGY

## 2022-01-18 PROCEDURE — 3008F BODY MASS INDEX DOCD: CPT | Mod: HCNC,CPTII,S$GLB, | Performed by: ANESTHESIOLOGY

## 2022-01-18 PROCEDURE — 3080F PR MOST RECENT DIASTOLIC BLOOD PRESSURE >= 90 MM HG: ICD-10-PCS | Mod: HCNC,CPTII,S$GLB, | Performed by: ANESTHESIOLOGY

## 2022-01-18 PROCEDURE — 99999 PR PBB SHADOW E&M-EST. PATIENT-LVL V: ICD-10-PCS | Mod: PBBFAC,HCNC,, | Performed by: ANESTHESIOLOGY

## 2022-01-18 PROCEDURE — 1159F MED LIST DOCD IN RCRD: CPT | Mod: HCNC,CPTII,S$GLB, | Performed by: ANESTHESIOLOGY

## 2022-01-18 PROCEDURE — 3080F DIAST BP >= 90 MM HG: CPT | Mod: HCNC,CPTII,S$GLB, | Performed by: ANESTHESIOLOGY

## 2022-01-18 PROCEDURE — 99214 OFFICE O/P EST MOD 30 MIN: CPT | Mod: HCNC,S$GLB,, | Performed by: ANESTHESIOLOGY

## 2022-01-18 PROCEDURE — 99214 PR OFFICE/OUTPT VISIT, EST, LEVL IV, 30-39 MIN: ICD-10-PCS | Mod: HCNC,S$GLB,, | Performed by: ANESTHESIOLOGY

## 2022-01-18 PROCEDURE — 1160F RVW MEDS BY RX/DR IN RCRD: CPT | Mod: HCNC,CPTII,S$GLB, | Performed by: ANESTHESIOLOGY

## 2022-01-18 PROCEDURE — 3077F SYST BP >= 140 MM HG: CPT | Mod: HCNC,CPTII,S$GLB, | Performed by: ANESTHESIOLOGY

## 2022-01-18 PROCEDURE — 1160F PR REVIEW ALL MEDS BY PRESCRIBER/CLIN PHARMACIST DOCUMENTED: ICD-10-PCS | Mod: HCNC,CPTII,S$GLB, | Performed by: ANESTHESIOLOGY

## 2022-01-18 PROCEDURE — 3077F PR MOST RECENT SYSTOLIC BLOOD PRESSURE >= 140 MM HG: ICD-10-PCS | Mod: HCNC,CPTII,S$GLB, | Performed by: ANESTHESIOLOGY

## 2022-01-18 PROCEDURE — 1159F PR MEDICATION LIST DOCUMENTED IN MEDICAL RECORD: ICD-10-PCS | Mod: HCNC,CPTII,S$GLB, | Performed by: ANESTHESIOLOGY

## 2022-01-18 PROCEDURE — 3008F PR BODY MASS INDEX (BMI) DOCUMENTED: ICD-10-PCS | Mod: HCNC,CPTII,S$GLB, | Performed by: ANESTHESIOLOGY

## 2022-01-18 NOTE — H&P (VIEW-ONLY)
Chronic patient Established Note (Follow up visit)      SUBJECTIVE:    Interval History 1/18/2022:  Yanni Kaiser returns to clinic for follow up of low back pain. She reports resolution dermotomal right sided mid back pain with dermatomal distribution suspicious for shingles despite not having a rash s/p valtrex 1000mg BID for 7 days. In regard to low back pain, she had a L4-L5 TFESI scheduled that was cancelled due to recent abx for chronic sinusitis. She continues to have chronic left sided low back with with radiation down to the lateral thigh and into the anterior and posterior leg and into the plantar aspects of the feet.  She reports low back pain leg heaviness with walking that is releved with leaning on an object. Sitting relieves leg heaviness but not low back pain. Treats sx with topical voltaren gel, lyrica 50mg TID, and percocet 7.5-325 TID (#90 dispensed 1/17/22). Pain meds help but pain persists. She does report urge type incontinence as well as overflow type leakage between voids including overnight. She reports occasional significant LE weakness. Denies saddle anesthesia or ataxia.    Interval History 12/20/2021:  Yanni Kaiser presents to the clinmiic for a follow-up appointment for mid-back pain. Since the last visit, Yanni Kaiser states the pain has been persistant. She states that her biggest reason she is here today is for right-sided mid-back pain that started about 2 weeks ago as an itching in her right mid-back that wrapped around her right flank. It started as an itching, progressed to a burning after a couple of days, and is now more of an achy pain. She does not recall if she had a rash or not. She went to urgent care about a week ago and was prescribed Valtrex, but has not started taking it yet because she is nervous about taking a new medicine. She continues to have low back pain that radiates down the legs, mostly in the LLE.      Interval History  9/29/2020:  The patient is a virtual appointment today for follow-up of back and leg pain.  She is now status post L4/5 interlaminar epidural steroid injection.  She also reports limited benefit from this procedure.  Her pain is mainly located across the lower back with intermittent radiation into the legs.  Her back pain is currently her greatest complaint.  It bothers her the most when she stands for a long time in changes positions.  She is still in physical therapy but states that she missed her appointment today due to her nephew being ill.  Her pain today is 10/10.     Interval History 8/25/2020:  The patient is here today for follow-up of chronic lower back pain.  The pain radiates down the posterolateral aspect of both legs to her shin.  She had limited benefit with bilateral L4/5 TF NAKITA in the past.  We previously discussed IL NAKITA which she would like to schedule.  Since her previous encounter, she has started physical therapy which she thinks is helpful.  She has increased her home activity regimen as previously discussed.  Her pain today is 9/10     Interval History 7/14/2020:  The patient presents for follow up of lower back and leg pain.  She is s/p bilateral L4/5 TF NAKITA on 6/24/20 with limited benefit of leg pain.  She denies any respiratory changes such as fever, cough, or shortness of breath.  She continues to report pain that starts across the lower back with radiation down the side of both legs.  She has associated numbness and tingling to both legs.  Her pain worsens when she walks and when she is active.  She says that she had to PT appointments did not have follow-up appointments made.  The previous note indicates that she is to continue with PT she is seeing Snow [facility]azo for chronic pain as occasion.  She says that she has been taking Lyrica at night that is making her wake up in the middle of the night with dizziness.  She stops it as instructed by PCP.  She has an appointment with her  neurologist tomorrow.  Her pain today is 10/10.     Previous Encounter:  Yanni Kaiser presents to the clinic for the evaluation of lower back pain. The pain started 3 years ago following no specific incident and symptoms have been worsening.The pain is located in the lumbar area and radiates to the left leg mostly but also into the right leg. Non-specific dermatomal distribution.  The pain is described as aching, sharp and shooting and is rated as 10/10. The pain is rated with a score of  10/10 on the BEST day and a score of 10/10 on the WORST day.  Symptoms interfere with daily activity and sleeping. The pain is exacerbated by Sitting, Walking, Lifting and Getting out of bed/chair.  The pain is mitigated by heat, ice, medications and physical therapy. She reports spending 4 hours per day reclining. The patient reports 0 hours of uninterrupted sleep per night.     Physical Therapy/Home Exercise: yes     Pain Disability Index Review:  Last 3 PDI Scores 1/18/2022 12/20/2021 11/11/2020   Pain Disability Index (PDI) 47 58 52     Pain Medications:     - Opioids: Percocet (Oxycodone/Acetaminophen)  - Adjuvant Medications: Advil,Motrin ( Ibuprofen)   Lidoderm patch, without benefit  Voltaren gel, without benefit  Gabapentin, side effects  Lamotrigine      report:  Reviewed and consistent with medication use as prescribed.     Pain Procedures:   6/4/20 Bilateral L4/5 TF NAKITA- only a few days of pain relief      Imaging:   Narrative       EXAMINATION:  MRI LUMBAR SPINE WITHOUT CONTRAST    CLINICAL HISTORY:  spinal stenosis; Lumbago with sciatica, right side    TECHNIQUE:  Multiplanar, multisequence MR images were acquired from the thoracolumbar junction to the sacrum without contrast.    COMPARISON:  MRI lumbar spine 08/28/2018    FINDINGS:  Straightening of the normal lumbar lordosis.  Mild grade 1 retrolisthesis L5 on S1    Vertebral body heights are maintained.  Several T1 and T2 hyperintense lesions within the  L2 and L3 vertebral body favored to represent osseous hemangiomas.  Otherwise normal marrow signal.  No fracture.    Multilevel partial disc desiccation throughout the lumbar spine.  Posterior annular fissuring at L2-L3.    Distal spinal cord is unremarkable.  Conus terminates at L1-L2.    Degenerative findings:    T12-L1: No disc herniation, spinal canal stenosis, or neural foraminal narrowing.    L1-L2: No disc herniation, spinal canal stenosis, or neural foraminal narrowing.    L2-L3: No disc herniation, spinal canal stenosis or neural foraminal narrowing.    L3-L4: Mild circumferential disc bulge without spinal canal stenosis or neural foraminal narrowing.    L4-L5: Circumferential disc bulge, facet arthropathy, and ligamentum flavum hypertrophy resulting in moderate spinal canal stenosis and moderate bilateral neural foraminal narrowing.    L5-S1: Circumferential disc bulge and facet arthropathy without spinal canal stenosis or neural foraminal narrowing.    Paraspinal muscles and soft tissues are unremarkable.       Impression         Multilevel lumbar spondylosis most prominent at L4-5 resulting in moderate spinal canal stenosis and moderate bilateral neural foraminal narrowing.  Findings have slightly progressed in comparison to prior study dated 08/28/2018.    Electronically signed by resident: Fady Reese  Date: 05/22/2020  Time: 08:27    Electronically signed by: Abelardo Gold MD  Date: 05/22/2020  Time: 08:57         Allergies:   Review of patient's allergies indicates:   Allergen Reactions    Gabapentin Other (See Comments)     Makes her twitch       Current Medications:   Current Outpatient Medications   Medication Sig Dispense Refill    albuterol (PROVENTIL) 2.5 mg /3 mL (0.083 %) nebulizer solution Take 3 mLs (2.5 mg total) by nebulization every 6 (six) hours as needed for Wheezing. Rescue 3 mL 11    albuterol (PROVENTIL/VENTOLIN HFA) 90 mcg/actuation inhaler Inhale 2 puffs into the lungs  every 6 (six) hours as needed for Wheezing or Shortness of Breath. Rescue 54 g 6    alcohol swabs (BD ALCOHOL SWABS) PadM Apply 1 each topically 2 (two) times a day. 200 each 4    allerg xt,D.farinae-D.pteronys (ODACTRA) 12 SQ-HDM Subl Place 1 tablet under the tongue once daily. 30 tablet 11    ammonium lactate 12 % Crea Apply twice daily to affected parts both feet as needed. 140 g 11    azelastine (ASTELIN) 137 mcg (0.1 %) nasal spray 1 spray (137 mcg total) by Nasal route 2 (two) times daily. 30 mL 11    blood sugar diagnostic Strp 1 each by Misc.(Non-Drug; Combo Route) route 4 (four) times daily before meals and nightly. ACCU CHEK ELVIA PLUS METER 200 each 3    blood-glucose meter (ACCU-CHEK ELVIA PLUS METER) Misc TEST FOUR TIMES DAILY BEFORE MEALS AND EVERY NIGHT 90 each 1    budesonide-formoterol 160-4.5 mcg (SYMBICORT) 160-4.5 mcg/actuation HFAA Inhale 2 puffs into the lungs every 12 (twelve) hours. Controller 1 Inhaler 5    butalbital-acetaminophen-caffeine -40 mg (FIORICET, ESGIC) -40 mg per tablet Take 1 tablet by mouth every 4 (four) hours as needed for Pain. 12 tablet 0    cetirizine (ZYRTEC) 10 MG tablet Take 1 tablet (10 mg total) by mouth daily as needed for Allergies (itching). 30 tablet 0    diclofenac sodium (VOLTAREN) 1 % Gel Apply 2 g topically 4 (four) times daily. 1 Tube 2    diclofenac sodium (VOLTAREN) 1 % Gel Apply 2 g topically 2 (two) times daily. 100 g 2    doxycycline (VIBRA-TABS) 100 MG tablet Take 1 tablet (100 mg total) by mouth 2 (two) times daily. 20 tablet 0    JARDIANCE 10 mg tablet TAKE 1 TABLET(10 MG) BY MOUTH EVERY DAY 30 tablet 1    lancets (ACCU-CHEK FASTCLIX LANCET DRUM) Misc 1 Device by Misc.(Non-Drug; Combo Route) route 2 (two) times a day. 200 each 4    lancets (ACCU-CHEK SOFTCLIX LANCETS) Misc 1 Device by Misc.(Non-Drug; Combo Route) route 4 (four) times daily. 200 each 3    levocetirizine (XYZAL) 5 MG tablet Take 1 tablet (5 mg total) by  mouth every evening. 90 tablet 3    lidocaine (LIDODERM) 5 % Place 1 patch onto the skin once daily. Remove & Discard patch within 12 hours or as directed by MD 15 patch 0    LIDOcaine HCL 2% (XYLOCAINE) 2 % jelly Apply topically as needed. Apply topically once nightly to affected part of foot/feet. 30 mL 2    losartan (COZAAR) 100 MG tablet Take 1 tablet (100 mg total) by mouth once daily. 90 tablet 0    lubiprostone (AMITIZA) 24 MCG Cap       montelukast (SINGULAIR) 10 mg tablet TAKE 1 TABLET(10 MG) BY MOUTH EVERY EVENING 30 tablet 2    MOVANTIK 12.5 mg Tab Take 1 tablet by mouth once daily.      nicotine (NICODERM CQ) 21 mg/24 hr Place 1 patch onto the skin once daily. 28 patch 0    nicotine, polacrilex, (NICORETTE) 2 mg Gum Take 1 each (2 mg total) by mouth as needed (Take 1 piece as needed.  Maximum of 10 per day.). 200 each 0    ondansetron (ZOFRAN) 8 MG tablet Take 1 tablet (8 mg total) by mouth every 8 (eight) hours as needed for Nausea. 90 tablet 11    ondansetron (ZOFRAN-ODT) 4 MG TbDL Dissolve 1 tablet (4 mg total) by mouth every 8 (eight) hours as needed (nausea). 15 tablet 0    oxyCODONE-acetaminophen (PERCOCET) 7.5-325 mg per tablet Take 1 tablet by mouth 3 (three) times daily as needed for Pain. 90 tablet 0    OZEMPIC 1 mg/dose (4 mg/3 mL) Inject 1 mg into the skin every 7 days. 3 pen 0    pantoprazole (PROTONIX) 40 MG tablet TAKE 1 TABLET(40 MG) BY MOUTH TWICE DAILY 60 tablet 6    QUEtiapine (SEROQUEL) 25 MG Tab Take 1 tablet (25 mg total) by mouth once daily. 90 tablet 0    sodium chloride (SALINE NASAL) 0.65 % nasal spray 2 sprays by Nasal route as needed for Congestion. 104 mL 12    triamcinolone acetonide 0.1% (KENALOG) 0.1 % ointment Apply topically 2 (two) times daily as needed (rash). 1 each 0    TROKENDI  mg Cp24       venlafaxine (EFFEXOR-XR) 75 MG 24 hr capsule Take 1 capsule (75 mg total) by mouth once daily. 90 capsule 1    EPINEPHrine (EPIPEN) 0.3 mg/0.3 mL AtIn  Inject 0.3 mLs (0.3 mg total) into the muscle once. for 1 dose 2 each 1    pregabalin (LYRICA) 50 MG capsule Take 1 capsule (50 mg total) by mouth 3 (three) times daily. 90 capsule 3    valACYclovir (VALTREX) 1000 MG tablet Take 1 tablet (1,000 mg total) by mouth 2 (two) times daily. for 7 days 14 tablet 0     Current Facility-Administered Medications   Medication Dose Route Frequency Provider Last Rate Last Admin    acetaminophen tablet 650 mg  650 mg Oral Once PRN Carlyle A. MD Rand        albuterol inhaler 2 puff  2 puff Inhalation Q20 Min PRN Carlyle A. MD Rand        diphenhydrAMINE injection 25 mg  25 mg Intravenous Once PRN Carlyel A. MD Rand        EPINEPHrine (EPIPEN) 0.3 mg/0.3 mL pen injection 0.3 mg  0.3 mg Intramuscular PRN Carlyle A. MD Rand        methylPREDNISolone sodium succinate injection 40 mg  40 mg Intravenous Once PRN Carlyle A. MD Rand        ondansetron disintegrating tablet 4 mg  4 mg Oral Once PRN Carlyle A. MD Rand        sodium chloride 0.9% 500 mL flush bag   Intravenous PRN Carlyle A. MD Rand        sodium chloride 0.9% flush 10 mL  10 mL Intravenous PRN Carlyle A. MD Rand           REVIEW OF SYSTEMS:    GENERAL:  No weight loss, malaise or fevers.  HEENT:  Negative for frequent or significant headaches.  NECK:  Negative for lumps, goiter, pain and significant neck swelling.  RESPIRATORY:  Negative for cough, wheezing or shortness of breath.  CARDIOVASCULAR:  Negative for chest pain, leg swelling or palpitations.  GI:  Negative for abdominal discomfort, blood in stools or black stools or change in bowel habits.  MUSCULOSKELETAL:  See HPI.  SKIN:  [+] for rash, itching  PSYCH:  Negative for sleep disturbance, mood disorder and recent psychosocial stressors.  HEMATOLOGY/LYMPHOLOGY:  Negative for prolonged bleeding, bruising easily or swollen nodes.  NEURO:   No history of headaches, syncope, paralysis, seizures or tremors.  All other reviewed and negative other than  HPI.    Past Medical History:  Past Medical History:   Diagnosis Date    Allergy     Anemia     Anxiety     Asthma     Back pain     Chronic bronchitis     Cigarette smoker     DDD (degenerative disc disease), lumbar 2020    Depression     Diabetes with neurologic complications     DUB (dysfunctional uterine bleeding) 10/16/2018    GERD (gastroesophageal reflux disease)     High cholesterol     Hyperprolactinemia     Hypertension     Influenza A 2020    Neuromuscular disorder     Obese body habitus     Obesity     Pseudotumor cerebri     Renal manifestation of secondary diabetes mellitus     Respiratory failure     Seizures     Simple endometrial hyperplasia     Sleep apnea     Smoker     Tobacco dependence     Urinary incontinence        Past Surgical History:  Past Surgical History:   Procedure Laterality Date    ANTROSTOMY OF MAXILLARY SINUS AT INFERIOR NASAL MEATUS  10/22/2021    Procedure: MAXILLARY ANTROSTOMY, AT INFERIOR NASAL MEATUS;  Surgeon: John Prado III, MD;  Location: [facility] OR;  Service: ENT;;    BREAST CYST ASPIRATION       SECTION      X 1    CHOLECYSTECTOMY      COLONOSCOPY      COLONOSCOPY N/A 2019    Procedure: COLONOSCOPY;  Surgeon: Jagruti Sweeney MD;  [facility]  Service: Endoscopy;  Laterality: N/A;  BMI is 63/gastroparesis/3 days full liquid diet 1 day clear liquid see telephone encounter by Ciara Brown     EPIDURAL STEROID INJECTION N/A 2020    Procedure: INJECTION, STEROID, EPIDURAL, L5-S1 IL;  Surgeon: Lawrence Mrain MD;  Location: [facility] PAIN MGT;  Service: Pain Management;  Laterality: N/A;    ESOPHAGEAL MANOMETRY WITH MEASUREMENT OF IMPEDANCE N/A 2019    Procedure: MANOMETRY-ESOPHAGEAL-WITH IMPEDANCE;  Surgeon: Jagruti Sweeney MD;  [facility]  Service: Endoscopy;  Laterality: N/A;  Hold Narcotics x 1 days   Hold TCA x 1 days  Propofol only. No fentanyl or  benzodiazepine during sedation. If additional sedation needed, discuss with Dr. Sweeney.  10/29 - pt confirmed appt    ESOPHAGOGASTRODUODENOSCOPY N/A 1/14/2019    Procedure: EGD (ESOPHAGOGASTRODUODENOSCOPY);  Surgeon: Jagruti Sweeney MD;  Location: [facility] (2ND FLR);  Service: Endoscopy;  Laterality: N/A;  BMI is 63/gastroparesis/3 days full liquid diet 1 day clear liquid see telephone encounter by Ciara Brown     ESOPHAGOGASTRODUODENOSCOPY N/A 11/5/2019    Procedure: ESOPHAGOGASTRODUODENOSCOPY (EGD);  Surgeon: Jagruti Sweeney MD;  Location: [facility] (2ND FLR);  Service: Endoscopy;  Laterality: N/A;  EGD with EndoFlip /+/- Botox  2nd floor for gastroparesis/BMI 63 **367lbs**  Bariatric Stretcher needed  Manometry probe to be placed endoscopically during EGD procedure  Due to change in protocol, Full liquid diet for 3 days/ 1 day of clear liquids  Hi    ESOPHAGOGASTRODUODENOSCOPY N/A 12/14/2020    Procedure: ESOPHAGOGASTRODUODENOSCOPY (EGD) with BRAVO;  Surgeon: Jagruti Sweeney MD;  Location: [facility] (2ND FLR);  Service: Endoscopy;  Laterality: N/A;  with Dilation  2nd floor due to BMI 56.20 (327 lbs) and gastroparesis  3 days full liquid diet and 1 day clears    ESOPHAGOGASTRODUODENOSCOPY N/A 4/15/2021    Procedure: ESOPHAGOGASTRODUODENOSCOPY (EGD);  Surgeon: Jagruti Sweeney MD;  Location: [facility] (2ND FLR);  Service: Endoscopy;  Laterality: N/A;  Endoflip  2nd floor-gastroparesis, BMI 57.18, bariatric stretcher  full liquid diet x3 days, clear liquid diet x1 day prior to procedure  propofol only  covid test 4/12 primary care, instructions emailed-[facility]    FOOT FRACTURE SURGERY Left     FUNCTIONAL ENDOSCOPIC SINUS SURGERY (FESS) USING COMPUTER-ASSISTED NAVIGATION Bilateral 10/22/2021    Procedure: FESS, USING COMPUTER-ASSISTED NAVIGATION;  Surgeon: John Prado III, MD;  Location: [facility];  Service: ENT;  Laterality: Bilateral;  FOLLOW DR PRADO ANESTHESIA PROTOCAL / pt will stay 23 hour  post surgery for SHARATH observation    HYSTEROSCOPY WITH DILATION AND CURETTAGE OF UTERUS N/A 10/16/2018    [facility]    INJECTION OF ANESTHETIC AGENT AROUND NERVE Bilateral 10/23/2020    Procedure: BLOCK, NERVE  bilateral L3,4,5 MBB;  Surgeon: Lawrence Marin MD;  Location: [facility] PAIN MGT;  Service: Pain Management;  Laterality: Bilateral;  bilateral L3,4,5 MBB   consent needed    PLANTAR FASCIOTOMY Left 11/18/2019    Procedure: FASCIOTOMY, PLANTAR;  Surgeon: Valentino Orourke DPM;  Location: [address];  Service: Podiatry;  Laterality: Left;    RETINAL LASER PROCEDURE Left     SINUS SURGERY      SPHENOIDECTOMY Bilateral 10/22/2021    Procedure: SPHENOIDECTOMY;  Surgeon: John Prado III, MD;  Location: [facility] OR;  Service: ENT;  Laterality: Bilateral;    TRANSFORAMINAL EPIDURAL INJECTION OF STEROID Bilateral 6/24/2020    Procedure: INJECTION, STEROID, EPIDURAL, TRANSFORAMINAL APPROACH;  Surgeon: Lawrence Marin MD;  Location: [facility] PAIN MGT;  Service: Pain Management;  Laterality: Bilateral;    UPPER GASTROINTESTINAL ENDOSCOPY         Family History:  Family History   Problem Relation Age of Onset    Hypertension Mother     Diabetes Mother     Colon cancer Mother 50    Colon polyps Mother     Cataracts Mother     Heart disease Father     COPD Father     Heart attack Father     Hypertension Father     Ulcers Father     Cataracts Father     Glaucoma Father     Cancer Maternal Grandmother     Breast cancer Maternal Grandmother     Colon cancer Maternal Grandmother     Colon polyps Maternal Grandmother     No Known Problems Paternal Grandmother     No Known Problems Brother     No Known Problems Maternal Aunt     No Known Problems Maternal Uncle     No Known Problems Paternal Aunt     No Known Problems Paternal Uncle     No Known Problems Maternal Grandfather     No Known Problems Paternal Grandfather     No Known Problems Daughter     No Known Problems Son     No Known Problems  Daughter     No Known Problems Daughter     Celiac disease Neg Hx     Cirrhosis Neg Hx     Crohn's disease Neg Hx     Cystic fibrosis Neg Hx     Esophageal cancer Neg Hx     Hemochromatosis Neg Hx     Inflammatory bowel disease Neg Hx     Irritable bowel syndrome Neg Hx     Liver cancer Neg Hx     Liver disease Neg Hx     Rectal cancer Neg Hx     Stomach cancer Neg Hx     Ulcerative colitis Neg Hx     Jonnie's disease Neg Hx     Tuberculosis Neg Hx     Lymphoma Neg Hx     Scleroderma Neg Hx     Rheum arthritis Neg Hx     Melanoma Neg Hx     Multiple sclerosis Neg Hx     Psoriasis Neg Hx     Lupus Neg Hx     Skin cancer Neg Hx     Amblyopia Neg Hx     Blindness Neg Hx     Macular degeneration Neg Hx     Retinal detachment Neg Hx     Strabismus Neg Hx     Stroke Neg Hx     Thyroid disease Neg Hx     Allergic rhinitis Neg Hx     Allergies Neg Hx     Angioedema Neg Hx     Asthma Neg Hx     Eczema Neg Hx     Immunodeficiency Neg Hx     Rhinitis Neg Hx     Urticaria Neg Hx     Atopy Neg Hx        Social History:  Social History     Socioeconomic History    Marital status:    Tobacco Use    Smoking status: Current Every Day Smoker     Packs/day: 1.00     Years: 27.00     Pack years: 27.00     Types: Cigarettes     Start date: 1/1/1992    Smokeless tobacco: Never Used    Tobacco comment: 1/2 a pack if her days are good   Substance and Sexual Activity    Alcohol use: Not Currently     Alcohol/week: 0.0 standard drinks     Comment: socially    Drug use: No    Sexual activity: Yes     Partners: Male     Birth control/protection: None     Comment:      Social Determinants of Health     Financial Resource Strain: Medium Risk    Difficulty of Paying Living Expenses: Somewhat hard   Food Insecurity: Food Insecurity Present    Worried About Running Out of Food in the Last Year: Sometimes true    Ran Out of Food in the Last Year: Sometimes true   Transportation Needs:  "No Transportation Needs    Lack of Transportation (Medical): No    Lack of Transportation (Non-Medical): No   Physical Activity: Inactive    Days of Exercise per Week: 0 days    Minutes of Exercise per Session: 0 min   Stress: Stress Concern Present    Feeling of Stress : To some extent   Social Connections: Unknown    Frequency of Communication with Friends and Family: Never    Frequency of Social Gatherings with Friends and Family: Never    Active Member of Clubs or Organizations: Yes    Attends Club or Organization Meetings: More than 4 times per year    Marital Status:    Housing Stability: High Risk    Unable to Pay for Housing in the Last Year: Yes    Number of Places Lived in the Last Year: 2    Unstable Housing in the Last Year: Yes       OBJECTIVE:    BP (!) 158/100 (BP Location: Right arm, Patient Position: Sitting, BP Method: X-Large (Automatic))   Pulse 91   Resp 17   Ht 5' 4" (1.626 m)   Wt (!) 163.2 kg (359 lb 12.7 oz)   LMP 08/17/2019   BMI 61.76 kg/m²     GENERAL: Well appearing, in no acute distress, alert and oriented x3.  PSYCH:  Mood and affect appropriate.  SKIN: Skin color, texture, turgor normal.  HEENT:  Normocephalic, atraumatic. Cranial nerves grossly intact.  PULM: No evidence of respiratory difficulty, symmetric chest rise.  GI:  Non-distended  BACK: Pain-limited range of motion in all directions. Pain with extension > Flexion. Positive facet loading. . + pain to palpation over facet joints, SIJs, and BL GTBs and to percussion over spinous processes. Straight leg raising in the seated position is positive to radicular pain on the left.  EXTREMITIES: No deformities, edema, or skin discoloration.   MUSCULOSKELETAL: Strength exam limited by pain with giveway weakness in all muscles tested, most notably in left ADF and EHL. There may be mild weakness of the left ADF and EHL, however, patient is unable to sustain contraction for adequate evaluation. No atrophy is " noted.  NEURO: Sensation is equal and appropriate bilaterally. 2+ patellar reflexes BL. Diminished ankle reflexes bilaterally.   GAIT: Antalgic      ASSESSMENT: 51 y.o. year old female with chronic multifactorial low back pain consistent with:     1. Lumbar radiculopathy  Procedure Order to Pain Management    Ambulatory referral/consult to Bariatric Medicine   2. Spinal stenosis of lumbar region with neurogenic claudication     3. Lumbar spondylosis     4. Chronic pain syndrome  Ambulatory referral/consult to [facility]   5. Obesity, unspecified classification, unspecified obesity type, unspecified whether serious comorbidity present  Ambulatory referral/consult to Bariatric Medicine   6. Urge incontinence  Ambulatory referral/consult to Urogynecology       Discussion: Patient with multiple sources of low back pain, as well as evidence of wide spread pain with radiating pain to light touch. Nonetheless, her radicular sx are in an L4 dermatome and she does have some asymmetric strength with ADF suggestive of lumbar radiculopathy driving her sx. If sx fail to improve with epidural, can consider facet targeted intervention as extension and facet palpation recreated her sx. Given her significant sx and widespread pain, she is a good candidate functional restoration program.    PLAN:   1. PT/OT/HEP: Referral to functional restoration program. Discussed benefits of exercise for pain. Continue aquatherapy  2. Procedures: Schedule BL L4/L5 TFESI. Consider MBB/RFA and/or BL GTB injection pathway if sx fail to improve.  3. Medications:  - Continue lyrica 50mg TID  - Continue Voltaren gel  - Patient prescribed percocet 7.5-325 (#90 dispensed 1/17/22) by another provider   4. Imaging: Reviewed MRI and Thoracic X-ray. Counseled patient to discuss incontinence with PCP. MRI recent within the past 6 months, can consider repeat MRI if incontinence work up negative or if sx worsen  5. Labs: Reviewed.  Medications  are appropriately dosed for current hepatorenal function.  6. Referral:  Bariatric medicine for weight loss   7. RTC: 4 weeks, schedule BL L4/5 TFESI for next available    The above plan and management options were discussed at length with patient. Patient is in agreement with the above and verbalized understanding.    Reji Temple MD  [employer] [facility] PGY2  01/18/2022    I have reviewed and concur with the resident's history, physical, assessment, and plan.  I have personally interviewed and examined the patient at bedside.  See below addendum for my evaluation and additional findings.    Lawrence Marin MD

## 2022-01-18 NOTE — PROGRESS NOTES
Chronic patient Established Note (Follow up visit)      SUBJECTIVE:    Interval History 1/18/2022:  Yanni Kaiser returns to clinic for follow up of low back pain. She reports resolution dermotomal right sided mid back pain with dermatomal distribution suspicious for shingles despite not having a rash s/p valtrex 1000mg BID for 7 days. In regard to low back pain, she had a L4-L5 TFESI scheduled that was cancelled due to recent abx for chronic sinusitis. She continues to have chronic left sided low back with with radiation down to the lateral thigh and into the anterior and posterior leg and into the plantar aspects of the feet.  She reports low back pain leg heaviness with walking that is releved with leaning on an object. Sitting relieves leg heaviness but not low back pain. Treats sx with topical voltaren gel, lyrica 50mg TID, and percocet 7.5-325 TID (#90 dispensed 1/17/22). Pain meds help but pain persists. She does report urge type incontinence as well as overflow type leakage between voids including overnight. She reports occasional significant LE weakness. Denies saddle anesthesia or ataxia.    Interval History 12/20/2021:  Yanni Kaiser presents to the clinmiic for a follow-up appointment for mid-back pain. Since the last visit, Yanni Kaiser states the pain has been persistant. She states that her biggest reason she is here today is for right-sided mid-back pain that started about 2 weeks ago as an itching in her right mid-back that wrapped around her right flank. It started as an itching, progressed to a burning after a couple of days, and is now more of an achy pain. She does not recall if she had a rash or not. She went to urgent care about a week ago and was prescribed Valtrex, but has not started taking it yet because she is nervous about taking a new medicine. She continues to have low back pain that radiates down the legs, mostly in the LLE.      Interval History  9/29/2020:  The patient is a virtual appointment today for follow-up of back and leg pain.  She is now status post L4/5 interlaminar epidural steroid injection.  She also reports limited benefit from this procedure.  Her pain is mainly located across the lower back with intermittent radiation into the legs.  Her back pain is currently her greatest complaint.  It bothers her the most when she stands for a long time in changes positions.  She is still in physical therapy but states that she missed her appointment today due to her nephew being ill.  Her pain today is 10/10.     Interval History 8/25/2020:  The patient is here today for follow-up of chronic lower back pain.  The pain radiates down the posterolateral aspect of both legs to her shin.  She had limited benefit with bilateral L4/5 TF NAKITA in the past.  We previously discussed IL NAKITA which she would like to schedule.  Since her previous encounter, she has started physical therapy which she thinks is helpful.  She has increased her home activity regimen as previously discussed.  Her pain today is 9/10     Interval History 7/14/2020:  The patient presents for follow up of lower back and leg pain.  She is s/p bilateral L4/5 TF NAKITA on 6/24/20 with limited benefit of leg pain.  She denies any respiratory changes such as fever, cough, or shortness of breath.  She continues to report pain that starts across the lower back with radiation down the side of both legs.  She has associated numbness and tingling to both legs.  Her pain worsens when she walks and when she is active.  She says that she had to PT appointments did not have follow-up appointments made.  The previous note indicates that she is to continue with PT she is seeing Snow [facility]azo for chronic pain as occasion.  She says that she has been taking Lyrica at night that is making her wake up in the middle of the night with dizziness.  She stops it as instructed by PCP.  She has an appointment with her  neurologist tomorrow.  Her pain today is 10/10.     Previous Encounter:  Yanni Kaiser presents to the clinic for the evaluation of lower back pain. The pain started 3 years ago following no specific incident and symptoms have been worsening.The pain is located in the lumbar area and radiates to the left leg mostly but also into the right leg. Non-specific dermatomal distribution.  The pain is described as aching, sharp and shooting and is rated as 10/10. The pain is rated with a score of  10/10 on the BEST day and a score of 10/10 on the WORST day.  Symptoms interfere with daily activity and sleeping. The pain is exacerbated by Sitting, Walking, Lifting and Getting out of bed/chair.  The pain is mitigated by heat, ice, medications and physical therapy. She reports spending 4 hours per day reclining. The patient reports 0 hours of uninterrupted sleep per night.     Physical Therapy/Home Exercise: yes     Pain Disability Index Review:  Last 3 PDI Scores 1/18/2022 12/20/2021 11/11/2020   Pain Disability Index (PDI) 47 58 52     Pain Medications:     - Opioids: Percocet (Oxycodone/Acetaminophen)  - Adjuvant Medications: Advil,Motrin ( Ibuprofen)   Lidoderm patch, without benefit  Voltaren gel, without benefit  Gabapentin, side effects  Lamotrigine      report:  Reviewed and consistent with medication use as prescribed.     Pain Procedures:   6/4/20 Bilateral L4/5 TF NAKITA- only a few days of pain relief      Imaging:   Narrative       EXAMINATION:  MRI LUMBAR SPINE WITHOUT CONTRAST    CLINICAL HISTORY:  spinal stenosis; Lumbago with sciatica, right side    TECHNIQUE:  Multiplanar, multisequence MR images were acquired from the thoracolumbar junction to the sacrum without contrast.    COMPARISON:  MRI lumbar spine 08/28/2018    FINDINGS:  Straightening of the normal lumbar lordosis.  Mild grade 1 retrolisthesis L5 on S1    Vertebral body heights are maintained.  Several T1 and T2 hyperintense lesions within the  L2 and L3 vertebral body favored to represent osseous hemangiomas.  Otherwise normal marrow signal.  No fracture.    Multilevel partial disc desiccation throughout the lumbar spine.  Posterior annular fissuring at L2-L3.    Distal spinal cord is unremarkable.  Conus terminates at L1-L2.    Degenerative findings:    T12-L1: No disc herniation, spinal canal stenosis, or neural foraminal narrowing.    L1-L2: No disc herniation, spinal canal stenosis, or neural foraminal narrowing.    L2-L3: No disc herniation, spinal canal stenosis or neural foraminal narrowing.    L3-L4: Mild circumferential disc bulge without spinal canal stenosis or neural foraminal narrowing.    L4-L5: Circumferential disc bulge, facet arthropathy, and ligamentum flavum hypertrophy resulting in moderate spinal canal stenosis and moderate bilateral neural foraminal narrowing.    L5-S1: Circumferential disc bulge and facet arthropathy without spinal canal stenosis or neural foraminal narrowing.    Paraspinal muscles and soft tissues are unremarkable.       Impression         Multilevel lumbar spondylosis most prominent at L4-5 resulting in moderate spinal canal stenosis and moderate bilateral neural foraminal narrowing.  Findings have slightly progressed in comparison to prior study dated 08/28/2018.    Electronically signed by resident: Fady Reese  Date: 05/22/2020  Time: 08:27    Electronically signed by: Abelardo Gold MD  Date: 05/22/2020  Time: 08:57         Allergies:   Review of patient's allergies indicates:   Allergen Reactions    Gabapentin Other (See Comments)     Makes her twitch       Current Medications:   Current Outpatient Medications   Medication Sig Dispense Refill    albuterol (PROVENTIL) 2.5 mg /3 mL (0.083 %) nebulizer solution Take 3 mLs (2.5 mg total) by nebulization every 6 (six) hours as needed for Wheezing. Rescue 3 mL 11    albuterol (PROVENTIL/VENTOLIN HFA) 90 mcg/actuation inhaler Inhale 2 puffs into the lungs  every 6 (six) hours as needed for Wheezing or Shortness of Breath. Rescue 54 g 6    alcohol swabs (BD ALCOHOL SWABS) PadM Apply 1 each topically 2 (two) times a day. 200 each 4    allerg xt,D.farinae-D.pteronys (ODACTRA) 12 SQ-HDM Subl Place 1 tablet under the tongue once daily. 30 tablet 11    ammonium lactate 12 % Crea Apply twice daily to affected parts both feet as needed. 140 g 11    azelastine (ASTELIN) 137 mcg (0.1 %) nasal spray 1 spray (137 mcg total) by Nasal route 2 (two) times daily. 30 mL 11    blood sugar diagnostic Strp 1 each by Misc.(Non-Drug; Combo Route) route 4 (four) times daily before meals and nightly. ACCU CHEK ELVIA PLUS METER 200 each 3    blood-glucose meter (ACCU-CHEK ELVIA PLUS METER) Misc TEST FOUR TIMES DAILY BEFORE MEALS AND EVERY NIGHT 90 each 1    budesonide-formoterol 160-4.5 mcg (SYMBICORT) 160-4.5 mcg/actuation HFAA Inhale 2 puffs into the lungs every 12 (twelve) hours. Controller 1 Inhaler 5    butalbital-acetaminophen-caffeine -40 mg (FIORICET, ESGIC) -40 mg per tablet Take 1 tablet by mouth every 4 (four) hours as needed for Pain. 12 tablet 0    cetirizine (ZYRTEC) 10 MG tablet Take 1 tablet (10 mg total) by mouth daily as needed for Allergies (itching). 30 tablet 0    diclofenac sodium (VOLTAREN) 1 % Gel Apply 2 g topically 4 (four) times daily. 1 Tube 2    diclofenac sodium (VOLTAREN) 1 % Gel Apply 2 g topically 2 (two) times daily. 100 g 2    doxycycline (VIBRA-TABS) 100 MG tablet Take 1 tablet (100 mg total) by mouth 2 (two) times daily. 20 tablet 0    JARDIANCE 10 mg tablet TAKE 1 TABLET(10 MG) BY MOUTH EVERY DAY 30 tablet 1    lancets (ACCU-CHEK FASTCLIX LANCET DRUM) Misc 1 Device by Misc.(Non-Drug; Combo Route) route 2 (two) times a day. 200 each 4    lancets (ACCU-CHEK SOFTCLIX LANCETS) Misc 1 Device by Misc.(Non-Drug; Combo Route) route 4 (four) times daily. 200 each 3    levocetirizine (XYZAL) 5 MG tablet Take 1 tablet (5 mg total) by  mouth every evening. 90 tablet 3    lidocaine (LIDODERM) 5 % Place 1 patch onto the skin once daily. Remove & Discard patch within 12 hours or as directed by MD 15 patch 0    LIDOcaine HCL 2% (XYLOCAINE) 2 % jelly Apply topically as needed. Apply topically once nightly to affected part of foot/feet. 30 mL 2    losartan (COZAAR) 100 MG tablet Take 1 tablet (100 mg total) by mouth once daily. 90 tablet 0    lubiprostone (AMITIZA) 24 MCG Cap       montelukast (SINGULAIR) 10 mg tablet TAKE 1 TABLET(10 MG) BY MOUTH EVERY EVENING 30 tablet 2    MOVANTIK 12.5 mg Tab Take 1 tablet by mouth once daily.      nicotine (NICODERM CQ) 21 mg/24 hr Place 1 patch onto the skin once daily. 28 patch 0    nicotine, polacrilex, (NICORETTE) 2 mg Gum Take 1 each (2 mg total) by mouth as needed (Take 1 piece as needed.  Maximum of 10 per day.). 200 each 0    ondansetron (ZOFRAN) 8 MG tablet Take 1 tablet (8 mg total) by mouth every 8 (eight) hours as needed for Nausea. 90 tablet 11    ondansetron (ZOFRAN-ODT) 4 MG TbDL Dissolve 1 tablet (4 mg total) by mouth every 8 (eight) hours as needed (nausea). 15 tablet 0    oxyCODONE-acetaminophen (PERCOCET) 7.5-325 mg per tablet Take 1 tablet by mouth 3 (three) times daily as needed for Pain. 90 tablet 0    OZEMPIC 1 mg/dose (4 mg/3 mL) Inject 1 mg into the skin every 7 days. 3 pen 0    pantoprazole (PROTONIX) 40 MG tablet TAKE 1 TABLET(40 MG) BY MOUTH TWICE DAILY 60 tablet 6    QUEtiapine (SEROQUEL) 25 MG Tab Take 1 tablet (25 mg total) by mouth once daily. 90 tablet 0    sodium chloride (SALINE NASAL) 0.65 % nasal spray 2 sprays by Nasal route as needed for Congestion. 104 mL 12    triamcinolone acetonide 0.1% (KENALOG) 0.1 % ointment Apply topically 2 (two) times daily as needed (rash). 1 each 0    TROKENDI  mg Cp24       venlafaxine (EFFEXOR-XR) 75 MG 24 hr capsule Take 1 capsule (75 mg total) by mouth once daily. 90 capsule 1    EPINEPHrine (EPIPEN) 0.3 mg/0.3 mL AtIn  Inject 0.3 mLs (0.3 mg total) into the muscle once. for 1 dose 2 each 1    pregabalin (LYRICA) 50 MG capsule Take 1 capsule (50 mg total) by mouth 3 (three) times daily. 90 capsule 3    valACYclovir (VALTREX) 1000 MG tablet Take 1 tablet (1,000 mg total) by mouth 2 (two) times daily. for 7 days 14 tablet 0     Current Facility-Administered Medications   Medication Dose Route Frequency Provider Last Rate Last Admin    acetaminophen tablet 650 mg  650 mg Oral Once PRN Carlyle A. MD Rand        albuterol inhaler 2 puff  2 puff Inhalation Q20 Min PRN Carlyle A. MD Rand        diphenhydrAMINE injection 25 mg  25 mg Intravenous Once PRN Carlyle A. MD Rand        EPINEPHrine (EPIPEN) 0.3 mg/0.3 mL pen injection 0.3 mg  0.3 mg Intramuscular PRN Carlyle A. MD Rand        methylPREDNISolone sodium succinate injection 40 mg  40 mg Intravenous Once PRN Carlyle A. MD Rand        ondansetron disintegrating tablet 4 mg  4 mg Oral Once PRN Carlyle A. MD Rand        sodium chloride 0.9% 500 mL flush bag   Intravenous PRN Carlyle A. MD Rand        sodium chloride 0.9% flush 10 mL  10 mL Intravenous PRN Carlyle A. MD Rand           REVIEW OF SYSTEMS:    GENERAL:  No weight loss, malaise or fevers.  HEENT:  Negative for frequent or significant headaches.  NECK:  Negative for lumps, goiter, pain and significant neck swelling.  RESPIRATORY:  Negative for cough, wheezing or shortness of breath.  CARDIOVASCULAR:  Negative for chest pain, leg swelling or palpitations.  GI:  Negative for abdominal discomfort, blood in stools or black stools or change in bowel habits.  MUSCULOSKELETAL:  See HPI.  SKIN:  [+] for rash, itching  PSYCH:  Negative for sleep disturbance, mood disorder and recent psychosocial stressors.  HEMATOLOGY/LYMPHOLOGY:  Negative for prolonged bleeding, bruising easily or swollen nodes.  NEURO:   No history of headaches, syncope, paralysis, seizures or tremors.  All other reviewed and negative other than  HPI.    Past Medical History:  Past Medical History:   Diagnosis Date    Allergy     Anemia     Anxiety     Asthma     Back pain     Chronic bronchitis     Cigarette smoker     DDD (degenerative disc disease), lumbar 2020    Depression     Diabetes with neurologic complications     DUB (dysfunctional uterine bleeding) 10/16/2018    GERD (gastroesophageal reflux disease)     High cholesterol     Hyperprolactinemia     Hypertension     Influenza A 2020    Neuromuscular disorder     Obese body habitus     Obesity     Pseudotumor cerebri     Renal manifestation of secondary diabetes mellitus     Respiratory failure     Seizures     Simple endometrial hyperplasia     Sleep apnea     Smoker     Tobacco dependence     Urinary incontinence        Past Surgical History:  Past Surgical History:   Procedure Laterality Date    ANTROSTOMY OF MAXILLARY SINUS AT INFERIOR NASAL MEATUS  10/22/2021    Procedure: MAXILLARY ANTROSTOMY, AT INFERIOR NASAL MEATUS;  Surgeon: John Prado III, MD;  Location: [facility] OR;  Service: ENT;;    BREAST CYST ASPIRATION       SECTION      X 1    CHOLECYSTECTOMY      COLONOSCOPY      COLONOSCOPY N/A 2019    Procedure: COLONOSCOPY;  Surgeon: Jagruti Sweeney MD;  [facility]  Service: Endoscopy;  Laterality: N/A;  BMI is 63/gastroparesis/3 days full liquid diet 1 day clear liquid see telephone encounter by Ciara Brown     EPIDURAL STEROID INJECTION N/A 2020    Procedure: INJECTION, STEROID, EPIDURAL, L5-S1 IL;  Surgeon: Lawrence Marin MD;  Location: [facility] PAIN MGT;  Service: Pain Management;  Laterality: N/A;    ESOPHAGEAL MANOMETRY WITH MEASUREMENT OF IMPEDANCE N/A 2019    Procedure: MANOMETRY-ESOPHAGEAL-WITH IMPEDANCE;  Surgeon: Jagruti Sweeney MD;  Location: [facility] ENDO ([facility]);  Service: Endoscopy;  Laterality: N/A;  Hold Narcotics x 1 days   Hold TCA x 1 days  Propofol only. No fentanyl or  benzodiazepine during sedation. If additional sedation needed, discuss with Dr. Sweeney.  10/29 - pt confirmed appt    ESOPHAGOGASTRODUODENOSCOPY N/A 1/14/2019    Procedure: EGD (ESOPHAGOGASTRODUODENOSCOPY);  Surgeon: Jagruti Sweeney MD;  Location: [facility] (2ND FLR);  Service: Endoscopy;  Laterality: N/A;  BMI is 63/gastroparesis/3 days full liquid diet 1 day clear liquid see telephone encounter by Ciara Brown     ESOPHAGOGASTRODUODENOSCOPY N/A 11/5/2019    Procedure: ESOPHAGOGASTRODUODENOSCOPY (EGD);  Surgeon: Jagruti Sweeney MD;  Location: [facility] (2ND FLR);  Service: Endoscopy;  Laterality: N/A;  EGD with EndoFlip /+/- Botox  2nd floor for gastroparesis/BMI 63 **367lbs**  Bariatric Stretcher needed  Manometry probe to be placed endoscopically during EGD procedure  Due to change in protocol, Full liquid diet for 3 days/ 1 day of clear liquids  Hi    ESOPHAGOGASTRODUODENOSCOPY N/A 12/14/2020    Procedure: ESOPHAGOGASTRODUODENOSCOPY (EGD) with BRAVO;  Surgeon: Jagruti Sweeney MD;  Location: [facility] (2ND FLR);  Service: Endoscopy;  Laterality: N/A;  with Dilation  2nd floor due to BMI 56.20 (327 lbs) and gastroparesis  3 days full liquid diet and 1 day clears    ESOPHAGOGASTRODUODENOSCOPY N/A 4/15/2021    Procedure: ESOPHAGOGASTRODUODENOSCOPY (EGD);  Surgeon: Jagruti Sweeney MD;  Location: [facility] (2ND FLR);  Service: Endoscopy;  Laterality: N/A;  Endoflip  2nd floor-gastroparesis, BMI 57.18, bariatric stretcher  full liquid diet x3 days, clear liquid diet x1 day prior to procedure  propofol only  covid test 4/12 primary care, instructions emailed-[facility]    FOOT FRACTURE SURGERY Left     FUNCTIONAL ENDOSCOPIC SINUS SURGERY (FESS) USING COMPUTER-ASSISTED NAVIGATION Bilateral 10/22/2021    Procedure: FESS, USING COMPUTER-ASSISTED NAVIGATION;  Surgeon: John Prado III, MD;  Location: [facility];  Service: ENT;  Laterality: Bilateral;  FOLLOW DR PRADO ANESTHESIA PROTOCAL / pt will stay 23 hour  post surgery for SHARATH observation    HYSTEROSCOPY WITH DILATION AND CURETTAGE OF UTERUS N/A 10/16/2018    [facility]    INJECTION OF ANESTHETIC AGENT AROUND NERVE Bilateral 10/23/2020    Procedure: BLOCK, NERVE  bilateral L3,4,5 MBB;  Surgeon: Lawrence Marin MD;  Location: [facility] PAIN MGT;  Service: Pain Management;  Laterality: Bilateral;  bilateral L3,4,5 MBB   consent needed    PLANTAR FASCIOTOMY Left 11/18/2019    Procedure: FASCIOTOMY, PLANTAR;  Surgeon: Valentino Orourke DPM;  Location: [address];  Service: Podiatry;  Laterality: Left;    RETINAL LASER PROCEDURE Left     SINUS SURGERY      SPHENOIDECTOMY Bilateral 10/22/2021    Procedure: SPHENOIDECTOMY;  Surgeon: John Prado III, MD;  Location: [facility] OR;  Service: ENT;  Laterality: Bilateral;    TRANSFORAMINAL EPIDURAL INJECTION OF STEROID Bilateral 6/24/2020    Procedure: INJECTION, STEROID, EPIDURAL, TRANSFORAMINAL APPROACH;  Surgeon: Lawrence Marin MD;  Location: [facility] PAIN MGT;  Service: Pain Management;  Laterality: Bilateral;    UPPER GASTROINTESTINAL ENDOSCOPY         Family History:  Family History   Problem Relation Age of Onset    Hypertension Mother     Diabetes Mother     Colon cancer Mother 50    Colon polyps Mother     Cataracts Mother     Heart disease Father     COPD Father     Heart attack Father     Hypertension Father     Ulcers Father     Cataracts Father     Glaucoma Father     Cancer Maternal Grandmother     Breast cancer Maternal Grandmother     Colon cancer Maternal Grandmother     Colon polyps Maternal Grandmother     No Known Problems Paternal Grandmother     No Known Problems Brother     No Known Problems Maternal Aunt     No Known Problems Maternal Uncle     No Known Problems Paternal Aunt     No Known Problems Paternal Uncle     No Known Problems Maternal Grandfather     No Known Problems Paternal Grandfather     No Known Problems Daughter     No Known Problems Son     No Known Problems  Daughter     No Known Problems Daughter     Celiac disease Neg Hx     Cirrhosis Neg Hx     Crohn's disease Neg Hx     Cystic fibrosis Neg Hx     Esophageal cancer Neg Hx     Hemochromatosis Neg Hx     Inflammatory bowel disease Neg Hx     Irritable bowel syndrome Neg Hx     Liver cancer Neg Hx     Liver disease Neg Hx     Rectal cancer Neg Hx     Stomach cancer Neg Hx     Ulcerative colitis Neg Hx     Jonnie's disease Neg Hx     Tuberculosis Neg Hx     Lymphoma Neg Hx     Scleroderma Neg Hx     Rheum arthritis Neg Hx     Melanoma Neg Hx     Multiple sclerosis Neg Hx     Psoriasis Neg Hx     Lupus Neg Hx     Skin cancer Neg Hx     Amblyopia Neg Hx     Blindness Neg Hx     Macular degeneration Neg Hx     Retinal detachment Neg Hx     Strabismus Neg Hx     Stroke Neg Hx     Thyroid disease Neg Hx     Allergic rhinitis Neg Hx     Allergies Neg Hx     Angioedema Neg Hx     Asthma Neg Hx     Eczema Neg Hx     Immunodeficiency Neg Hx     Rhinitis Neg Hx     Urticaria Neg Hx     Atopy Neg Hx        Social History:  Social History     Socioeconomic History    Marital status:    Tobacco Use    Smoking status: Current Every Day Smoker     Packs/day: 1.00     Years: 27.00     Pack years: 27.00     Types: Cigarettes     Start date: 1/1/1992    Smokeless tobacco: Never Used    Tobacco comment: 1/2 a pack if her days are good   Substance and Sexual Activity    Alcohol use: Not Currently     Alcohol/week: 0.0 standard drinks     Comment: socially    Drug use: No    Sexual activity: Yes     Partners: Male     Birth control/protection: None     Comment:      Social Determinants of Health     Financial Resource Strain: Medium Risk    Difficulty of Paying Living Expenses: Somewhat hard   Food Insecurity: Food Insecurity Present    Worried About Running Out of Food in the Last Year: Sometimes true    Ran Out of Food in the Last Year: Sometimes true   Transportation Needs:  "No Transportation Needs    Lack of Transportation (Medical): No    Lack of Transportation (Non-Medical): No   Physical Activity: Inactive    Days of Exercise per Week: 0 days    Minutes of Exercise per Session: 0 min   Stress: Stress Concern Present    Feeling of Stress : To some extent   Social Connections: Unknown    Frequency of Communication with Friends and Family: Never    Frequency of Social Gatherings with Friends and Family: Never    Active Member of Clubs or Organizations: Yes    Attends Club or Organization Meetings: More than 4 times per year    Marital Status:    Housing Stability: High Risk    Unable to Pay for Housing in the Last Year: Yes    Number of Places Lived in the Last Year: 2    Unstable Housing in the Last Year: Yes       OBJECTIVE:    BP (!) 158/100 (BP Location: Right arm, Patient Position: Sitting, BP Method: X-Large (Automatic))   Pulse 91   Resp 17   Ht 5' 4" (1.626 m)   Wt (!) 163.2 kg (359 lb 12.7 oz)   LMP 08/17/2019   BMI 61.76 kg/m²     GENERAL: Well appearing, in no acute distress, alert and oriented x3.  PSYCH:  Mood and affect appropriate.  SKIN: Skin color, texture, turgor normal.  HEENT:  Normocephalic, atraumatic. Cranial nerves grossly intact.  PULM: No evidence of respiratory difficulty, symmetric chest rise.  GI:  Non-distended  BACK: Pain-limited range of motion in all directions. Pain with extension > Flexion. Positive facet loading. . + pain to palpation over facet joints, SIJs, and BL GTBs and to percussion over spinous processes. Straight leg raising in the seated position is positive to radicular pain on the left.  EXTREMITIES: No deformities, edema, or skin discoloration.   MUSCULOSKELETAL: Strength exam limited by pain with giveway weakness in all muscles tested, most notably in left ADF and EHL. There may be mild weakness of the left ADF and EHL, however, patient is unable to sustain contraction for adequate evaluation. No atrophy is " noted.  NEURO: Sensation is equal and appropriate bilaterally. 2+ patellar reflexes BL. Diminished ankle reflexes bilaterally.   GAIT: Antalgic      ASSESSMENT: 51 y.o. year old female with chronic multifactorial low back pain consistent with:     1. Lumbar radiculopathy  Procedure Order to Pain Management    Ambulatory referral/consult to Bariatric Medicine   2. Spinal stenosis of lumbar region with neurogenic claudication     3. Lumbar spondylosis     4. Chronic pain syndrome  Ambulatory referral/consult to [facility]   5. Obesity, unspecified classification, unspecified obesity type, unspecified whether serious comorbidity present  Ambulatory referral/consult to Bariatric Medicine   6. Urge incontinence  Ambulatory referral/consult to Urogynecology       Discussion: Patient with multiple sources of low back pain, as well as evidence of wide spread pain with radiating pain to light touch. Nonetheless, her radicular sx are in an L4 dermatome and she does have some asymmetric strength with ADF suggestive of lumbar radiculopathy driving her sx. If sx fail to improve with epidural, can consider facet targeted intervention as extension and facet palpation recreated her sx. Given her significant sx and widespread pain, she is a good candidate functional restoration program.    PLAN:   1. PT/OT/HEP: Referral to functional restoration program. Discussed benefits of exercise for pain. Continue aquatherapy  2. Procedures: Schedule BL L4/L5 TFESI. Consider MBB/RFA and/or BL GTB injection pathway if sx fail to improve.  3. Medications:  - Continue lyrica 50mg TID  - Continue Voltaren gel  - Patient prescribed percocet 7.5-325 (#90 dispensed 1/17/22) by another provider   4. Imaging: Reviewed MRI and Thoracic X-ray. Counseled patient to discuss incontinence with PCP. MRI recent within the past 6 months, can consider repeat MRI if incontinence work up negative or if sx worsen  5. Labs: Reviewed.  Medications  are appropriately dosed for current hepatorenal function.  6. Referral:  Bariatric medicine for weight loss   7. RTC: 4 weeks, schedule BL L4/5 TFESI for next available    The above plan and management options were discussed at length with patient. Patient is in agreement with the above and verbalized understanding.    Reji Temple MD  [employer] PM[facility]R PGY2  01/18/2022    I have reviewed and concur with the resident's history, physical, assessment, and plan.  I have personally interviewed and examined the patient at bedside.  See below addendum for my evaluation and additional findings.    Lawrence Marin MD

## 2022-01-18 NOTE — TELEPHONE ENCOUNTER
No new care gaps identified.  Powered by [employer]. Reference number: [id number].   1/18/2022 5:58:37 AM CST

## 2022-01-19 ENCOUNTER — CLINICAL SUPPORT (OUTPATIENT)
Dept: REHABILITATION | Facility: HOSPITAL | Age: 52
End: 2022-01-19
Attending: NEUROLOGICAL SURGERY
Payer: MEDICARE

## 2022-01-19 DIAGNOSIS — M54.42 CHRONIC LEFT-SIDED LOW BACK PAIN WITH LEFT-SIDED SCIATICA: ICD-10-CM

## 2022-01-19 DIAGNOSIS — G89.29 CHRONIC LEFT-SIDED LOW BACK PAIN WITH LEFT-SIDED SCIATICA: ICD-10-CM

## 2022-01-19 PROCEDURE — 97113 AQUATIC THERAPY/EXERCISES: CPT | Mod: HCNC,CQ

## 2022-01-19 NOTE — PROGRESS NOTES
"    [facility]   Physical Therapy Treatment Note     Name: Yanni Kaiser  Clinic Number: [id number]    Therapy Diagnosis:   Encounter Diagnosis   Name Primary?    Chronic left-sided low back pain with left-sided sciatica      Physician: Will Prabhakar MD    Visit Date: 1/19/2022      Physician: Will Prabhakar MD     Physician Orders: PT Eval and Treat   Medical Diagnosis from Referral:   E66.01,Z68.44 (ICD-10-CM) - Morbid obesity with BMI of 60.0-69.9, adult   M54.6 (ICD-10-CM) - Acute bilateral thoracic back pain   M54.17 (ICD-10-CM) - Lumbosacral radiculopathy at L4         Evaluation Date: 1/4/2022  Authorization Period Expiration: 05/01/2022  Plan of Care Expiration: 3/31/2022  Visit # / Visits authorized: 4/20 + Eval  PTA Visit #: 3/6     Time In: 10:10 am   Time Out: 11:05 pm   Total Billable Time: 30 minutes    SUBJECTIVE     Pt reports: she has ankle pain that started with colder weather, she reports her Dr is wanting to give her an injection in her L leg    She was compliant with home exercise program.  Response to previous treatment: fatigue  Functional change: none at this time    Pain: 9/10, 8/10  Location: bilateral back, L ankle      OBJECTIVE     Objective Measures updated at progress report unless specified.     Treatment     Yanni received aquatic therapeutic exercises to develop strength, endurance, ROM, flexibility, posture, and core stabilization for 50 minutes including:    FUNCTIONAL MOBILITY TRAINING x 2 laps each at beginning and 1 lap each at end of session  Walk forward/backward/lateral    STRETCHES 2 x 30sec  Calf  hamstring    LE EX x 20 float cuffs  Squat  Heel raise with gluteal set  Hip abduction/adduction with B yellow db  Hip flex/ext with B yellow db  LAQ  Leg press aqua noodle/blue tubing  Tandem bal in // bars 2x20" ea  SL bal in // bars 2x20" ea  Tandem walk in // bars 2 laps  Wobble board 1' in // bars  Tripod clams  Step ups x20 " ea    UE EX/CORE  x 20  Shoulder flex/ext TA activation paddles closed  Shoulder horizontal abd/add TA activation paddles closed  Mini squat with push/pull red kickboard with back at wall    ENDURANCE  Bicycle in // bars 5'  LTR x 2' in // bars  DKTC in // bars x20      Patient Education and Home Exercises     Home Exercises Provided and Patient Education Provided     Education provided:   Role of aquatic therapy  Hydration post therapy    Written Home Exercises Provided: Patient instructed to cont prior HEP. Exercises were reviewed and Yanni was able to demonstrate them prior to the end of the session.  Yanni demonstrated good  understanding of the education provided. See EMR under Patient Instructions for exercises provided during therapy sessions    ASSESSMENT     Patient tolerated treatment well today with no reports of increased pain in the ankle, discussed continued mobility with HEP for decreased stiffness in the ankle and LB. Cues for decreased assist on bars with balance training.  Yanni Is progressing well towards her goals.   Pt prognosis is Good.     Pt will continue to benefit from skilled outpatient physical therapy to address the deficits listed in the problem list box on initial evaluation, provide pt/family education and to maximize pt's level of independence in the home and community environment.     Pt's spiritual, cultural and educational needs considered and pt agreeable to plan of care and goals.     Anticipated barriers to physical therapy: none at this time    Goals:     1.Report decreased low back pain  < / =  3/10  to increase tolerance for exercise  2. Increase ROM by 15 degrees where limited in order to perform ADLs without difficulty.  3. Increase strength by 1/3 MMT grade in gluteal strength  to increase tolerance for ADL and work activities.  4. Pt to tolerate HEP to improve ROM and independence with ADL's     Long Term Goals: 12 to 24 weeks  1.Report decreased low back  pain  < / = 2/10  to increase tolerance for ADLs  2.Patient goal: Reduce pain  3.Increase strength to 4+/5 in  Gluteal muscles  to increase tolerance for ADL and work activities.    PLAN     Progress POC as tolerated by the patient.    LD NEGRETE, PTA

## 2022-01-20 ENCOUNTER — CLINICAL SUPPORT (OUTPATIENT)
Dept: SMOKING CESSATION | Facility: CLINIC | Age: 52
End: 2022-01-20
Payer: COMMERCIAL

## 2022-01-20 DIAGNOSIS — F17.200 NICOTINE DEPENDENCE: Primary | ICD-10-CM

## 2022-01-20 PROCEDURE — 99404 PR PREVENT COUNSEL,INDIV,60 MIN: ICD-10-PCS | Mod: S$GLB,,,

## 2022-01-20 PROCEDURE — 99999 PR PBB SHADOW E&M-EST. PATIENT-LVL II: ICD-10-PCS | Mod: PBBFAC,,,

## 2022-01-20 PROCEDURE — 99999 PR PBB SHADOW E&M-EST. PATIENT-LVL II: CPT | Mod: PBBFAC,,,

## 2022-01-20 PROCEDURE — 99404 PREV MED CNSL INDIV APPRX 60: CPT | Mod: S$GLB,,,

## 2022-01-20 RX ORDER — MICONAZOLE NITRATE 2 %
2 CREAM (GRAM) TOPICAL
Qty: 220 EACH | Refills: 0 | Status: SHIPPED | OUTPATIENT
Start: 2022-01-20 | End: 2022-08-28

## 2022-01-20 RX ORDER — IBUPROFEN 200 MG
1 TABLET ORAL DAILY
Qty: 28 PATCH | Refills: 0 | Status: SHIPPED | OUTPATIENT
Start: 2022-01-20 | End: 2022-12-23 | Stop reason: SDUPTHER

## 2022-01-20 NOTE — PROGRESS NOTES
Individual Follow-Up Form    1/20/2022    Quit Date: TBD    Clinical Status of Patient: Outpatient    Length of Service: 60 minutes    Continuing Medication: yes  Patches or Nicotine gum    Other Medications: None      Target Symptoms: Withdrawal and medication side effects. The following were  rated moderate (3) to severe (4) on TCRS:  · Moderate (3): Crave and Desire   · Severe (4): None     Comments: Patient presented to clinic for follow-up visit, name and date of birth verified as two patient identifiers.   Patient reported she started smoking again due to stress. She is smoking about 12 CPD, and she has not started using 21 mg nicotine patch in conjunction or  2 mg nicotine gum.  Counselor reiterated the purpose and benefits of NRT, patient verbalized understanding.  Counselor discussed also discussed packet 3 with patient outlining high risk situations and developing a plan for them, understanding urges and cravings, managing emotions, and breathing exercises and meditation techniques.  Follow-up visit scheduled in two weeks.  Counselor will remain available should any further needs arise.      Diagnosis: F17.200    Next Visit: 2 weeks

## 2022-01-21 ENCOUNTER — TELEPHONE (OUTPATIENT)
Dept: ALLERGY | Facility: CLINIC | Age: 52
End: 2022-01-21
Payer: MEDICARE

## 2022-01-21 ENCOUNTER — PATIENT MESSAGE (OUTPATIENT)
Dept: ALLERGY | Facility: CLINIC | Age: 52
End: 2022-01-21
Payer: MEDICARE

## 2022-01-21 ENCOUNTER — SPECIALTY PHARMACY (OUTPATIENT)
Dept: PHARMACY | Facility: CLINIC | Age: 52
End: 2022-01-21
Payer: MEDICARE

## 2022-01-21 ENCOUNTER — CLINICAL SUPPORT (OUTPATIENT)
Dept: REHABILITATION | Facility: HOSPITAL | Age: 52
End: 2022-01-21
Attending: NEUROLOGICAL SURGERY
Payer: MEDICARE

## 2022-01-21 DIAGNOSIS — M54.42 CHRONIC LEFT-SIDED LOW BACK PAIN WITH LEFT-SIDED SCIATICA: ICD-10-CM

## 2022-01-21 DIAGNOSIS — G89.29 CHRONIC LEFT-SIDED LOW BACK PAIN WITH LEFT-SIDED SCIATICA: ICD-10-CM

## 2022-01-21 PROCEDURE — 97113 AQUATIC THERAPY/EXERCISES: CPT | Mod: HCNC,CQ

## 2022-01-21 NOTE — TELEPHONE ENCOUNTER
----- Message from Elizabeth A Bosworth, LPN sent at 1/21/2022 10:23 AM CST -----  Regarding: FW: Odactra FDO  FYI  ----- Message -----  From: Les Bone PharmD  Sent: 1/21/2022  10:15 AM CST  To: Candida Huerta MD, #  Subject: Odactra FDO                                      Good morning,    [facility] delivered Odactra and Epipen to the [facility] on 12/22. The patient states she has not be contacted yet for the first dose appointment. Can someone please reach out to schedule?    Thank you,    Les Bone, PharmD  Clinical Pharmacist   [facility]   P: [phone]

## 2022-01-21 NOTE — PROGRESS NOTES
"    [facility]   Physical Therapy Treatment Note     Name: Yanni Kaiser  Clinic Number: [id number]    Therapy Diagnosis:   Encounter Diagnosis   Name Primary?    Chronic left-sided low back pain with left-sided sciatica      Physician: Will Prabhakar MD    Visit Date: 1/21/2022      Physician: Will Prabhakar MD     Physician Orders: PT Eval and Treat   Medical Diagnosis from Referral:   E66.01,Z68.44 (ICD-10-CM) - Morbid obesity with BMI of 60.0-69.9, adult   M54.6 (ICD-10-CM) - Acute bilateral thoracic back pain   M54.17 (ICD-10-CM) - Lumbosacral radiculopathy at L4         Evaluation Date: 1/4/2022  Authorization Period Expiration: 05/01/2022  Plan of Care Expiration: 3/31/2022  Visit # / Visits authorized: 6/20 + Eval  PTA Visit #: 5/6     Time In: 10:35 am   Time Out: 11:30 pm   Total Billable Time: 30 minutes    SUBJECTIVE     Pt reports: no longer having her ankle pain    She was compliant with home exercise program.  Response to previous treatment: fatigue  Functional change: none at this time    Pain: 9/10  Location: bilateral back     OBJECTIVE     Objective Measures updated at progress report unless specified.     Treatment     Yanni received aquatic therapeutic exercises to develop strength, endurance, ROM, flexibility, posture, and core stabilization for 55 minutes including:    FUNCTIONAL MOBILITY TRAINING x 2 laps each at beginning and 1 lap each at end of session  Walk forward/backward/lateral    STRETCHES 2 x 30sec  Calf  hamstring    LE EX x 20 float cuffs  Squat  Heel raise with gluteal set  Hip abduction/adduction with B yellow db  Hip flex/ext with B yellow db  LAQ  Leg press aqua noodle/blue tubing  Tandem bal in // bars 2x20" ea  SL bal in // bars 2x20" ea  Tandem walk in // bars 2 laps  Wobble board 2' in // bars  Tripod clams  Step ups x20 ea  Theraband sidestepping NEXT    UE EX/CORE  x 20  Shoulder flex/ext TA activation paddles closed  Shoulder " horizontal abd/add TA activation paddles closed  Mini squat with push/pull red kickboard with back at wall    ENDURANCE  Bicycle in // bars 5'  LTR x 2' in // bars  DKTC in // bars 2'      Patient Education and Home Exercises     Home Exercises Provided and Patient Education Provided     Education provided:   Role of aquatic therapy  Hydration post therapy    Written Home Exercises Provided: Patient instructed to cont prior HEP. Exercises were reviewed and Yanni was able to demonstrate them prior to the end of the session.  Yanni demonstrated good  understanding of the education provided. See EMR under Patient Instructions for exercises provided during therapy sessions    ASSESSMENT     Patient tolerated treatment well today with improved overall balance noted today with balance training and dynamic movements. Plan to progress strength training at next visit per patient tolerance.   Yanni Is progressing well towards her goals.   Pt prognosis is Good.     Pt will continue to benefit from skilled outpatient physical therapy to address the deficits listed in the problem list box on initial evaluation, provide pt/family education and to maximize pt's level of independence in the home and community environment.     Pt's spiritual, cultural and educational needs considered and pt agreeable to plan of care and goals.     Anticipated barriers to physical therapy: none at this time    Goals:     1.Report decreased low back pain  < / =  3/10  to increase tolerance for exercise  2. Increase ROM by 15 degrees where limited in order to perform ADLs without difficulty.  3. Increase strength by 1/3 MMT grade in gluteal strength  to increase tolerance for ADL and work activities.  4. Pt to tolerate HEP to improve ROM and independence with ADL's     Long Term Goals: 12 to 24 weeks  1.Report decreased low back  pain < / = 2/10  to increase tolerance for ADLs  2.Patient goal: Reduce pain  3.Increase strength to 4+/5 in   Gluteal muscles  to increase tolerance for ADL and work activities.    PLAN     Progress POC as tolerated by the patient.    LD NEGRETE, PTA

## 2022-01-21 NOTE — TELEPHONE ENCOUNTER
Patient has not yet been scheduled for her FDO appointment. [facility] delivered Odactra & Epipen to the [facility] on 12/22. InBasket sent to have [facility] reach out to the patient to schedule.

## 2022-01-24 ENCOUNTER — CLINICAL SUPPORT (OUTPATIENT)
Dept: REHABILITATION | Facility: HOSPITAL | Age: 52
End: 2022-01-24
Attending: NEUROLOGICAL SURGERY
Payer: MEDICARE

## 2022-01-24 DIAGNOSIS — M54.42 CHRONIC LEFT-SIDED LOW BACK PAIN WITH LEFT-SIDED SCIATICA: ICD-10-CM

## 2022-01-24 DIAGNOSIS — G89.29 CHRONIC LEFT-SIDED LOW BACK PAIN WITH LEFT-SIDED SCIATICA: ICD-10-CM

## 2022-01-24 PROCEDURE — 97113 AQUATIC THERAPY/EXERCISES: CPT | Mod: HCNC

## 2022-01-24 NOTE — PROGRESS NOTES
"    [facility]   Physical Therapy Treatment Note     Name: Yanni Kaiser  Clinic Number: [id number]    Therapy Diagnosis:   Encounter Diagnosis   Name Primary?    Chronic left-sided low back pain with left-sided sciatica      Physician: Will Prabhakar MD    Visit Date: 1/24/2022      Physician: Will Prabhakar MD     Physician Orders: PT Eval and Treat   Medical Diagnosis from Referral:   E66.01,Z68.44 (ICD-10-CM) - Morbid obesity with BMI of 60.0-69.9, adult   M54.6 (ICD-10-CM) - Acute bilateral thoracic back pain   M54.17 (ICD-10-CM) - Lumbosacral radiculopathy at L4      Evaluation Date: 1/4/2022  Authorization Period Expiration: 03/31/2022  Plan of Care Expiration: 3/31/2022  Visit # / Visits authorized: 7/20 + Eval  PTA Visit #: 0/6     Time In: 9:05 am   Time Out: 1010 am   Total Billable Time: 60 minutes    SUBJECTIVE     Pt reports: feeling good in her ankle today    She was compliant with home exercise program.  Response to previous treatment: fatigue  Functional change: none at this time    Pain: 9/10  Location: bilateral back     OBJECTIVE     Objective Measures updated at progress report unless specified.     Treatment     Yanni received aquatic therapeutic exercises to develop strength, endurance, ROM, flexibility, posture, and core stabilization for 65 minutes including:    FUNCTIONAL MOBILITY TRAINING x 2 laps each at beginning and 1 lap each at end of session  Walk forward/backward/lateral    STRETCHES 2 x 30sec  Calf  hamstring    LE EX x 30 float cuffs (advance to ankle weights next visit)  Squat  Heel raise with gluteal set  Hip abduction/adduction with B yellow db  Hip flex/ext with B yellow db  LAQ  Leg press aqua noodle/blue tubing  Tripod clams    // Bars balance  SL balance 1' on R, 30"z 2 L with intermittent hand hold  (trial with UE perturbations with theratubing next visit)  Tandem walk in // bars 2 laps  Wobble board 2' without use of hands for " support (advance next visit with addition of UE movements)    Step ups on pool step  x30 ea (advance to red step next visit)  Theraband sidestepping with green theraband x 2 laps   Theraband monster walks with green theraband x 2 laps    UE EX/CORE  x30  Shoulder flex/ext TA activation paddles closed--advanced to rows and shoulder extension with theratubing  Shoulder horizontal abd/add TA activation paddles closed--advanced to rows and shoulder extension with theratubing  Mini squat with push/pull red kickboard with back at wall  B shoulder row with green theratubing   B shoulder extension with green theratubing    ENDURANCE  Bicycle in // bars 5'  LTR x 2' in // bars  DKTC in // bars 2'      Patient Education and Home Exercises     Home Exercises Provided and Patient Education Provided     Education provided:   Role of aquatic therapy  Hydration post therapy    Written Home Exercises Provided: Patient instructed to cont prior HEP. Exercises were reviewed and Yanni was able to demonstrate them prior to the end of the session.  Yanni demonstrated good  understanding of the education provided. See EMR under Patient Instructions for exercises provided during therapy sessions    ASSESSMENT     The patient continues to progress well with her strength and balance with reduced use of hands on wall for balance activities.         Yanni Is progressing well towards her goals.   Pt prognosis is Good.     Pt will continue to benefit from skilled outpatient physical therapy to address the deficits listed in the problem list box on initial evaluation, provide pt/family education and to maximize pt's level of independence in the home and community environment.     Pt's spiritual, cultural and educational needs considered and pt agreeable to plan of care and goals.     Anticipated barriers to physical therapy: none at this time    Goals:     1.Report decreased low back pain  < / =  3/10  to increase tolerance for  exercise  2. Increase ROM by 15 degrees where limited in order to perform ADLs without difficulty.  3. Increase strength by 1/3 MMT grade in gluteal strength  to increase tolerance for ADL and work activities.  4. Pt to tolerate HEP to improve ROM and independence with ADL's     Long Term Goals: 12 to 24 weeks  1.Report decreased low back  pain < / = 2/10  to increase tolerance for ADLs  2.Patient goal: Reduce pain  3.Increase strength to 4+/5 in  Gluteal muscles  to increase tolerance for ADL and work activities.    PLAN     Progress POC as tolerated by the patient.    Arti Rodriguez, PT

## 2022-01-25 ENCOUNTER — TELEPHONE (OUTPATIENT)
Dept: PAIN MEDICINE | Facility: OTHER | Age: 52
End: 2022-01-25
Payer: MEDICARE

## 2022-01-25 NOTE — TELEPHONE ENCOUNTER
Staff reached out to the pt to confirm her appt for  tomorrow, but she did not answer, so I left a message on her voicemail

## 2022-01-26 ENCOUNTER — OFFICE VISIT (OUTPATIENT)
Dept: PAIN MEDICINE | Facility: OTHER | Age: 52
End: 2022-01-26
Attending: ANESTHESIOLOGY
Payer: MEDICARE

## 2022-01-26 DIAGNOSIS — G89.4 CHRONIC PAIN SYNDROME: ICD-10-CM

## 2022-01-26 PROCEDURE — 99214 OFFICE O/P EST MOD 30 MIN: CPT | Mod: HCNC,,, | Performed by: NURSE PRACTITIONER

## 2022-01-26 PROCEDURE — 1159F MED LIST DOCD IN RCRD: CPT | Mod: HCNC,CPTII,, | Performed by: NURSE PRACTITIONER

## 2022-01-26 PROCEDURE — 1160F RVW MEDS BY RX/DR IN RCRD: CPT | Mod: HCNC,CPTII,, | Performed by: NURSE PRACTITIONER

## 2022-01-26 PROCEDURE — 99214 PR OFFICE/OUTPT VISIT, EST, LEVL IV, 30-39 MIN: ICD-10-PCS | Mod: HCNC,,, | Performed by: NURSE PRACTITIONER

## 2022-01-26 PROCEDURE — 1159F PR MEDICATION LIST DOCUMENTED IN MEDICAL RECORD: ICD-10-PCS | Mod: HCNC,CPTII,, | Performed by: NURSE PRACTITIONER

## 2022-01-26 PROCEDURE — 1160F PR REVIEW ALL MEDS BY PRESCRIBER/CLIN PHARMACIST DOCUMENTED: ICD-10-PCS | Mod: HCNC,CPTII,, | Performed by: NURSE PRACTITIONER

## 2022-01-26 NOTE — PROGRESS NOTES
[facility]     Subjective:       Chief Complaint Requiring Rehabilitation: chronic Pain    Consulted by: Dr. Marin (Pain Management)    HPI- f/u visit. (re-referred to FRP):  Pr again referred to FRP. Has been seen by me several times in the past now. FRP was recommended but she could not do it 2/2 the schedule (helps care for her grandson). CPE program was thus offered but she did not return for PT/OT visits.     She returns today after being referred again by Pain Mgmt. Back left leg and foot still hurting. She is in water therapy right now. Started about 2 weeks ago. Says it feels good and I like it. Says is easier to do the exercises in the water. Says afterwards 'I can feel it'; im a little sore but not too sore. She is getting injection Friday for her back. Since V she did see surgeon about her back; he said surgery should be last resort.     Not sleeping well. Back pain impacts ability to sleep well. Always moving around to get comfortable.     Not working. Disabled.     Still taking lyrica, percocet.     Mood- has been in a funk. Says I dont know if im falling into depression stage or what. Lays in bed a lot when home. Feels unhappy. Not sure what might make her happier. Says maybe its menopause. Does take effexor. Does not see a therapist and has not done this before.     Most concerned about weight and pain right now.     Going to smoking cessation program. Went a week without smoking but then started again. Smoking helps with anxiety/feeling nervous.    Lives with 5 yo grandson who has autism. His mother lives with her, too.     HPI:   Yanni Kaiser has been seen by me in the June of this year for initial FRP eval/Chronic Pain Education. She did not return for recommended f/u visits. She returns today as she was re-referred by Dr. Marin.     LCV with Dr. Marin was 11/11/20- MRI L spine ordered, pt referred to FRP, Neurosurgery and given TPIs. Per his note, she had  ETHAN 10/23/20 with no relief.     She is scheduled to see Dr. Mercado 12/7.     Seen by Dr. Negron 11/6/20- he rx Cymbalta (re-started med this date), percocet 7.5 mg tid prn, baclofen tid prn; he also rec f/u with Esh[facility] for inj and f/u with Bariatric Med.     Saw PCP 10/26/20 for COPD exacerbation. PCP notes that pt has been admitted to ICU multiple times for COPD exacerbations and continues to smoke despite counseling.     She says that her pain has been persistent. She has been doing HEP learned in PT. Last PT visit Sept- plan per note was to continue going, but pt did not go to any other appts.       Chronic Pain Education Visit #2:  Yanni Efren Job returns one week after CPE visit #1 for f/u and goal setting. We discussed 5 domains of health on which this Chronic Pain Self-Management program will focus:  1. Physical Health and Activity/Exercise (strengthening, stretching, aerobic exercises; address smoking and opioid use if pertinent)  2. Sleep/Sleep Hygiene  3. Mental/Emotional Health (including relaxation/meditation practice)  4. Nutrition   5. Socialization     She would like to focus for now on Physical Activity and Sleep as she sees these as two areas which significantly impact her function and QOL.     She said that since LCV she has walked 3/7 days. She said that she did 5 laps around her neighborhood park the first day and went longer the second day. She also has used an exercise step-up and did stretches and squats. She has noted an increase in her left foot pain and attributes this to her walking.     We also talked about her sleep- she usually gets in bed around 9:30/10. she said that she reads scripture on her phone before going to bed and falls asleep listening to music. She takes topamax, lyrica, and zanaflex at bedtime. She usually wakes up around 1-2 AM to use the bathroom and has difficulty falling asleep after that. She said that she stays in bed when she can't fall back asleep  because she knows if she wakes up her  will also wake when he realizes she is not there. She is followed my Sleep Medicine and LCV was 4/2/20- pt set a goal to lose 20 lbs and will f/u in 3 months for repeat home sleep testing. She does have a diagnosis of SHARATH and multiple co-morbid risk factors.       Chronic Pain Education Visit #1:    Measures obtained:  PDI: 65/70  PCS: 37/52  Promis-29:     Yanni Kaiser is a 51 y.o. F who presents today with chronic pain and was referred by Dr. Marin for Chronic Pain Education (CPE). CPE was developed to enhance patients' understanding of their pain and its impact on health and to provide ongoing chronic pain self-management counseling, education, and support.     Yanni Kaiser has a history of chronic pain for many years. She denies an inciting event or trauma. She has a PMH of DM, chronic bronchitis, GERD, asthma, pseudotumor cerebri (2002), seizures (2002), migraines, sleep apnea (no CPAP). Per review of Rheumatology and Pain Mgmt notes pt has been dx with Fibromyalgia. She also has lumbar stenosis with moderate central and foraminal narrowing at L4-5 per MRI report (5/20/20). She is scheduled for a L-NAKITA with Dr. Marin on 6/24/20. Dr. Marin also referred her to outpatient PT and she has her initial eval today at 1:00 pm.     She also has a hx of left foot pain and is s/p Plantar Fasciotomy surgery 11/2019 with Dr. Haven DPM. Last visit with him was 4/20/20 where she was given percocet, referred to Rheumatology for chronic pain syndrome and advised to apply voltaren gel and massage foot with frozen water bottle.     She is followed by Bariatrics for weight loss and is taking medication to help with this. Education with regard to diet and exercise has also been stressed.     HEP:  None    Physical Therapy:  Scheduled to start today.     Pain Medications:         · Currently taking: lyrica, tizanidine, effexor, topamax, imitrex    · Has tried  in the past: norco, percocet, tramadol, ibuprofen, mobic, neurontin, flexeril, voltaren gel    Interventional Procedures:  Scheduled for L-NAKITA 20 (Dr. Marin)    Relevant Surgeries:  none    Affecting sleep?  Yes    Affecting daily activities?  Yes    Affecting mood?  Yes     Work status:  Disabled 2/2 seizures/pseudotumor cerebri     Pt does smoke cigarettes daily. Denies hx of substance abuse.       Past Medical History:   Diagnosis Date    Allergy     Anemia     Anxiety     Asthma     Back pain     Chronic bronchitis     Cigarette smoker     DDD (degenerative disc disease), lumbar 2020    Depression     Diabetes with neurologic complications     DUB (dysfunctional uterine bleeding) 10/16/2018    GERD (gastroesophageal reflux disease)     High cholesterol     Hyperprolactinemia     Hypertension     Influenza A 2020    Neuromuscular disorder     Obese body habitus     Obesity     Pseudotumor cerebri     Renal manifestation of secondary diabetes mellitus     Respiratory failure     Seizures     Simple endometrial hyperplasia     Sleep apnea     Smoker     Tobacco dependence     Urinary incontinence        Past Surgical History:   Procedure Laterality Date    ANTROSTOMY OF MAXILLARY SINUS AT INFERIOR NASAL MEATUS  10/22/2021    Procedure: MAXILLARY ANTROSTOMY, AT INFERIOR NASAL MEATUS;  Surgeon: John Prado III, MD;  Location: [facility] OR;  Service: ENT;;    BREAST CYST ASPIRATION       SECTION      X 1    CHOLECYSTECTOMY      COLONOSCOPY      COLONOSCOPY N/A 2019    Procedure: COLONOSCOPY;  Surgeon: Jagruti Sweeney MD;  Location: [address]);  Service: Endoscopy;  Laterality: N/A;  BMI is 63/gastroparesis/3 days full liquid diet 1 day clear liquid see telephone encounter by Ciara limon    EPIDURAL STEROID INJECTION N/A 2020    Procedure: INJECTION, STEROID, EPIDURAL, L5-S1 IL;  Surgeon: Lawrence Marin MD;  Location: [facility]  PAIN MGT;  Service: Pain Management;  Laterality: N/A;    ESOPHAGEAL MANOMETRY WITH MEASUREMENT OF IMPEDANCE N/A 11/5/2019    Procedure: MANOMETRY-ESOPHAGEAL-WITH IMPEDANCE;  Surgeon: Jagruti Sweeney MD;  Location: [facility] (2ND FLR);  Service: Endoscopy;  Laterality: N/A;  Hold Narcotics x 1 days   Hold TCA x 1 days  Propofol only. No fentanyl or benzodiazepine during sedation. If additional sedation needed, discuss with Dr. Sweeney.  10/29 - pt confirmed appt    ESOPHAGOGASTRODUODENOSCOPY N/A 1/14/2019    Procedure: EGD (ESOPHAGOGASTRODUODENOSCOPY);  Surgeon: Jagruti Sweeney MD;  Location: [facility] (2ND FLR);  Service: Endoscopy;  Laterality: N/A;  BMI is 63/gastroparesis/3 days full liquid diet 1 day clear liquid see telephone encounter by Ciara Brown     ESOPHAGOGASTRODUODENOSCOPY N/A 11/5/2019    Procedure: ESOPHAGOGASTRODUODENOSCOPY (EGD);  Surgeon: Jagruti Sweeney MD;  Location: [facility] (2ND FLR);  Service: Endoscopy;  Laterality: N/A;  EGD with EndoFlip /+/- Botox  2nd floor for gastroparesis/BMI 63 **367lbs**  Bariatric Stretcher needed  Manometry probe to be placed endoscopically during EGD procedure  Due to change in protocol, Full liquid diet for 3 days/ 1 day of clear liquids  Hi    ESOPHAGOGASTRODUODENOSCOPY N/A 12/14/2020    Procedure: ESOPHAGOGASTRODUODENOSCOPY (EGD) with BRAVO;  Surgeon: Jagruti Sweeney MD;  Location: [facility] (2ND FLR);  Service: Endoscopy;  Laterality: N/A;  with Dilation  2nd floor due to BMI 56.20 (327 lbs) and gastroparesis  3 days full liquid diet and 1 day clears    ESOPHAGOGASTRODUODENOSCOPY N/A 4/15/2021    Procedure: ESOPHAGOGASTRODUODENOSCOPY (EGD);  Surgeon: Jagruti Sweeney MD;  Location: [facility] (2ND FLR);  Service: Endoscopy;  Laterality: N/A;  Endoflip  2nd floor-gastroparesis, BMI 57.18, bariatric stretcher  full liquid diet x3 days, clear liquid diet x1 day prior to procedure  propofol only  covid test 4/12 primary care, instructions  emailed-KPvt    FOOT FRACTURE SURGERY Left     FUNCTIONAL ENDOSCOPIC SINUS SURGERY (FESS) USING COMPUTER-ASSISTED NAVIGATION Bilateral 10/22/2021    Procedure: FESS, USING COMPUTER-ASSISTED NAVIGATION;  Surgeon: John Prado III, MD;  Location: [facility] OR;  Service: ENT;  Laterality: Bilateral;  FOLLOW DR PRADO ANESTHESIA PROTOCAL / pt will stay 23 hour post surgery for SHARATH observation    HYSTEROSCOPY WITH DILATION AND CURETTAGE OF UTERUS N/A 10/16/2018    KJB    INJECTION OF ANESTHETIC AGENT AROUND NERVE Bilateral 10/23/2020    Procedure: BLOCK, NERVE  bilateral L3,4,5 MBB;  Surgeon: Lawrence Marin MD;  Location: [facility] PAIN MGT;  Service: Pain Management;  Laterality: Bilateral;  bilateral L3,4,5 MBB   consent needed    PLANTAR FASCIOTOMY Left 11/18/2019    Procedure: FASCIOTOMY, PLANTAR;  Surgeon: Valentino Orourke DPM;  Location: [address];  Service: Podiatry;  Laterality: Left;    RETINAL LASER PROCEDURE Left     SINUS SURGERY      SPHENOIDECTOMY Bilateral 10/22/2021    Procedure: SPHENOIDECTOMY;  Surgeon: John Prado III, MD;  Location: [facility] OR;  Service: ENT;  Laterality: Bilateral;    TRANSFORAMINAL EPIDURAL INJECTION OF STEROID Bilateral 6/24/2020    Procedure: INJECTION, STEROID, EPIDURAL, TRANSFORAMINAL APPROACH;  Surgeon: Lawrence Marin MD;  Location: [facility] PAIN MGT;  Service: Pain Management;  Laterality: Bilateral;    UPPER GASTROINTESTINAL ENDOSCOPY         Review of patient's allergies indicates:   Allergen Reactions    Gabapentin Other (See Comments)     Makes her twitch       Current Outpatient Medications   Medication Sig Dispense Refill    albuterol (PROVENTIL) 2.5 mg /3 mL (0.083 %) nebulizer solution Take 3 mLs (2.5 mg total) by nebulization every 6 (six) hours as needed for Wheezing. Rescue 3 mL 11    albuterol (PROVENTIL/VENTOLIN HFA) 90 mcg/actuation inhaler Inhale 2 puffs into the lungs every 6 (six) hours as needed for Wheezing or Shortness of Breath. Rescue 54 g 6     alcohol swabs (BD ALCOHOL SWABS) PadM Apply 1 each topically 2 (two) times a day. 200 each 4    allerg xt,D.farinae-D.pteronys (ODACTRA) 12 SQ-HDM Subl Place 1 tablet under the tongue once daily. 30 tablet 11    ammonium lactate 12 % Crea Apply twice daily to affected parts both feet as needed. 140 g 11    azelastine (ASTELIN) 137 mcg (0.1 %) nasal spray 1 spray (137 mcg total) by Nasal route 2 (two) times daily. 30 mL 11    blood sugar diagnostic Strp 1 each by Misc.(Non-Drug; Combo Route) route 4 (four) times daily before meals and nightly. ACCU CHEK ELVIA PLUS METER 200 each 3    blood-glucose meter (ACCU-CHEK ELVIA PLUS METER) Mis TEST FOUR TIMES DAILY BEFORE MEALS AND EVERY NIGHT 90 each 1    budesonide-formoterol 160-4.5 mcg (SYMBICORT) 160-4.5 mcg/actuation HFAA Inhale 2 puffs into the lungs every 12 (twelve) hours. Controller 1 Inhaler 5    butalbital-acetaminophen-caffeine -40 mg (FIORICET, ESGIC) -40 mg per tablet Take 1 tablet by mouth every 4 (four) hours as needed for Pain. 12 tablet 0    cetirizine (ZYRTEC) 10 MG tablet Take 1 tablet (10 mg total) by mouth daily as needed for Allergies (itching). 30 tablet 0    diclofenac sodium (VOLTAREN) 1 % Gel Apply 2 g topically 4 (four) times daily. 1 Tube 2    diclofenac sodium (VOLTAREN) 1 % Gel Apply 2 g topically 2 (two) times daily. 100 g 2    doxycycline (VIBRA-TABS) 100 MG tablet Take 1 tablet (100 mg total) by mouth 2 (two) times daily. 20 tablet 0    EPINEPHrine (EPIPEN) 0.3 mg/0.3 mL AtIn Inject 0.3 mLs (0.3 mg total) into the muscle once. for 1 dose 2 each 1    JARDIANCE 10 mg tablet TAKE 1 TABLET(10 MG) BY MOUTH EVERY DAY 30 tablet 1    lancets (ACCU-CHEK FASTCLIX LANCET DRUM) Misc 1 Device by Misc.(Non-Drug; Combo Route) route 2 (two) times a day. 200 each 4    lancets (ACCU-CHEK SOFTCLIX LANCETS) Misc 1 Device by Misc.(Non-Drug; Combo Route) route 4 (four) times daily. 200 each 3    levocetirizine (XYZAL) 5 MG tablet  Take 1 tablet (5 mg total) by mouth every evening. 90 tablet 3    lidocaine (LIDODERM) 5 % Place 1 patch onto the skin once daily. Remove & Discard patch within 12 hours or as directed by MD 15 patch 0    LIDOcaine HCL 2% (XYLOCAINE) 2 % jelly Apply topically as needed. Apply topically once nightly to affected part of foot/feet. 30 mL 2    losartan (COZAAR) 100 MG tablet Take 1 tablet (100 mg total) by mouth once daily. 90 tablet 0    lubiprostone (AMITIZA) 24 MCG Cap       montelukast (SINGULAIR) 10 mg tablet TAKE 1 TABLET(10 MG) BY MOUTH EVERY EVENING 30 tablet 2    MOVANTIK 12.5 mg Tab Take 1 tablet by mouth once daily.      nicotine (NICODERM CQ) 21 mg/24 hr Place 1 patch onto the skin once daily. 28 patch 0    nicotine, polacrilex, (NICORETTE) 2 mg Gum Take 1 each (2 mg total) by mouth as needed (Take 1 piece as needed.  Maximum of 10 per day.). 220 each 0    ondansetron (ZOFRAN) 8 MG tablet Take 1 tablet (8 mg total) by mouth every 8 (eight) hours as needed for Nausea. 90 tablet 11    ondansetron (ZOFRAN-ODT) 4 MG TbDL Dissolve 1 tablet (4 mg total) by mouth every 8 (eight) hours as needed (nausea). 15 tablet 0    oxyCODONE-acetaminophen (PERCOCET) 7.5-325 mg per tablet Take 1 tablet by mouth 3 (three) times daily as needed for Pain. 90 tablet 0    OZEMPIC 1 mg/dose (4 mg/3 mL) Inject 1 mg into the skin every 7 days. 3 pen 0    pantoprazole (PROTONIX) 40 MG tablet TAKE 1 TABLET(40 MG) BY MOUTH TWICE DAILY 60 tablet 6    pregabalin (LYRICA) 50 MG capsule Take 1 capsule (50 mg total) by mouth 3 (three) times daily. 90 capsule 3    QUEtiapine (SEROQUEL) 25 MG Tab Take 1 tablet (25 mg total) by mouth once daily. 90 tablet 0    sodium chloride (SALINE NASAL) 0.65 % nasal spray 2 sprays by Nasal route as needed for Congestion. 104 mL 12    triamcinolone acetonide 0.1% (KENALOG) 0.1 % ointment Apply topically 2 (two) times daily as needed (rash). 1 each 0    TROKENDI  mg Cp24        valACYclovir (VALTREX) 1000 MG tablet Take 1 tablet (1,000 mg total) by mouth 2 (two) times daily. for 7 days 14 tablet 0    venlafaxine (EFFEXOR-XR) 75 MG 24 hr capsule Take 1 capsule (75 mg total) by mouth once daily. 90 capsule 1     Current Facility-Administered Medications   Medication Dose Route Frequency Provider Last Rate Last Admin    acetaminophen tablet 650 mg  650 mg Oral Once PRN Carlyle Gordillo MD        albuterol inhaler 2 puff  2 puff Inhalation Q20 Min PRN Carlyle A. MD Rand        diphenhydrAMINE injection 25 mg  25 mg Intravenous Once PRN Carlyle A. MD Rand        EPINEPHrine (EPIPEN) 0.3 mg/0.3 mL pen injection 0.3 mg  0.3 mg Intramuscular PRN Carlyle A. MD Rand        methylPREDNISolone sodium succinate injection 40 mg  40 mg Intravenous Once PRN Carlyle A. MD Rand        ondansetron disintegrating tablet 4 mg  4 mg Oral Once PRN Carlyle A. MD Rand        sodium chloride 0.9% 500 mL flush bag   Intravenous PRN Carlyle A. MD Rand        sodium chloride 0.9% flush 10 mL  10 mL Intravenous PRN Carlyle A. MD Rand           Family History   Problem Relation Age of Onset    Hypertension Mother     Diabetes Mother     Colon cancer Mother 50    Colon polyps Mother     Cataracts Mother     Heart disease Father     COPD Father     Heart attack Father     Hypertension Father     Ulcers Father     Cataracts Father     Glaucoma Father     Cancer Maternal Grandmother     Breast cancer Maternal Grandmother     Colon cancer Maternal Grandmother     Colon polyps Maternal Grandmother     No Known Problems Paternal Grandmother     No Known Problems Brother     No Known Problems Maternal Aunt     No Known Problems Maternal Uncle     No Known Problems Paternal Aunt     No Known Problems Paternal Uncle     No Known Problems Maternal Grandfather     No Known Problems Paternal Grandfather     No Known Problems Daughter     No Known Problems Son     No Known Problems Daughter      No Known Problems Daughter     Celiac disease Neg Hx     Cirrhosis Neg Hx     Crohn's disease Neg Hx     Cystic fibrosis Neg Hx     Esophageal cancer Neg Hx     Hemochromatosis Neg Hx     Inflammatory bowel disease Neg Hx     Irritable bowel syndrome Neg Hx     Liver cancer Neg Hx     Liver disease Neg Hx     Rectal cancer Neg Hx     Stomach cancer Neg Hx     Ulcerative colitis Neg Hx     Jonnie's disease Neg Hx     Tuberculosis Neg Hx     Lymphoma Neg Hx     Scleroderma Neg Hx     Rheum arthritis Neg Hx     Melanoma Neg Hx     Multiple sclerosis Neg Hx     Psoriasis Neg Hx     Lupus Neg Hx     Skin cancer Neg Hx     Amblyopia Neg Hx     Blindness Neg Hx     Macular degeneration Neg Hx     Retinal detachment Neg Hx     Strabismus Neg Hx     Stroke Neg Hx     Thyroid disease Neg Hx     Allergic rhinitis Neg Hx     Allergies Neg Hx     Angioedema Neg Hx     Asthma Neg Hx     Eczema Neg Hx     Immunodeficiency Neg Hx     Rhinitis Neg Hx     Urticaria Neg Hx     Atopy Neg Hx        Social History     Socioeconomic History    Marital status:    Tobacco Use    Smoking status: Current Every Day Smoker     Packs/day: 1.00     Years: 27.00     Pack years: 27.00     Types: Cigarettes     Start date: 1/1/1992    Smokeless tobacco: Never Used    Tobacco comment: 1/2 a pack if her days are good   Substance and Sexual Activity    Alcohol use: Not Currently     Alcohol/week: 0.0 standard drinks     Comment: socially    Drug use: No    Sexual activity: Yes     Partners: Male     Birth control/protection: None     Comment:      Social Determinants of Health     Financial Resource Strain: Medium Risk    Difficulty of Paying Living Expenses: Somewhat hard   Food Insecurity: Food Insecurity Present    Worried About Running Out of Food in the Last Year: Sometimes true    Ran Out of Food in the Last Year: Sometimes true   Transportation Needs: No Transportation Needs     Lack of Transportation (Medical): No    Lack of Transportation (Non-Medical): No   Physical Activity: Inactive    Days of Exercise per Week: 0 days    Minutes of Exercise per Session: 0 min   Stress: Stress Concern Present    Feeling of Stress : To some extent   Social Connections: Unknown    Frequency of Communication with Friends and Family: Never    Frequency of Social Gatherings with Friends and Family: Never    Active Member of Clubs or Organizations: Yes    Attends Club or Organization Meetings: More than 4 times per year    Marital Status:    Housing Stability: High Risk    Unable to Pay for Housing in the Last Year: Yes    Number of Places Lived in the Last Year: 2    Unstable Housing in the Last Year: Yes              Objective:       Objective:     GEN: fully alert, oriented. Well developed, well nourished. No acute distress.   HEENT: No trauma. Mucous membranes moist. Nares patent bilaterally.  PSYCH: Normal affect. Thought content appropriate.  CHEST: Breathing symmetric. No audible wheezing.  SKIN: Warm, pink, dry. No rash on exposed areas.   EXT: No cyanosis, clubbing, or edema. No color change or changes in nail or hair growth  Gait: pt able to walk independently without assistive device or support.           Imaging:    CLINICAL HISTORY:  spinal stenosis; Lumbago with sciatica, right side     TECHNIQUE:  Multiplanar, multisequence MR images were acquired from the thoracolumbar junction to the sacrum without contrast.     COMPARISON:  MRI lumbar spine 08/28/2018     FINDINGS:  Straightening of the normal lumbar lordosis.  Mild grade 1 retrolisthesis L5 on S1     Vertebral body heights are maintained.  Several T1 and T2 hyperintense lesions within the L2 and L3 vertebral body favored to represent osseous hemangiomas.  Otherwise normal marrow signal.  No fracture.     Multilevel partial disc desiccation throughout the lumbar spine.  Posterior annular fissuring at L2-L3.     Distal  spinal cord is unremarkable.  Conus terminates at L1-L2.     Degenerative findings:     T12-L1: No disc herniation, spinal canal stenosis, or neural foraminal narrowing.     L1-L2: No disc herniation, spinal canal stenosis, or neural foraminal narrowing.     L2-L3: No disc herniation, spinal canal stenosis or neural foraminal narrowing.     L3-L4: Mild circumferential disc bulge without spinal canal stenosis or neural foraminal narrowing.     L4-L5: Circumferential disc bulge, facet arthropathy, and ligamentum flavum hypertrophy resulting in moderate spinal canal stenosis and moderate bilateral neural foraminal narrowing.     L5-S1: Circumferential disc bulge and facet arthropathy without spinal canal stenosis or neural foraminal narrowing.     Paraspinal muscles and soft tissues are unremarkable.     Impression:     Multilevel lumbar spondylosis most prominent at L4-5 resulting in moderate spinal canal stenosis and moderate bilateral neural foraminal narrowing.  Findings have slightly progressed in comparison to prior study dated 08/28/2018.     Electronically signed by resident: Fady Reese  Date:                                            05/22/2020  Time:                                           08:27     Electronically signed by: Abelardo Gold MD  Date:                                            05/22/2020  Time:                                           08:57  EXAMINATION:  MRI FOOT (HINDFOOT) LEFT W W/O CONTRAST     CLINICAL HISTORY:  Foot pain, chronic, plantar fasciitis suspected;  Pain in left foot     TECHNIQUE:  Routine MRI evaluation of the left hindfoot performed with and without the use of 10 cc of Gadavist IV contrast.     COMPARISON:  Radiograph 09/06/2019.     FINDINGS:  Tendons: There is fusiform thickening of the distal 5.4 cm of the Achilles tendon with low-grade partial-thickness interstitial tearing of the anterior distal fibers and associated peritendinitis.  Note is made of a 0.4 cm  fluid filled gap within the anteromedial fibers of the distal Achilles tendon corresponding to aforementioned interstitial tear.  There is trace fluid within the peroneal tendon sheath suggesting tenosynovitis.  Posterior tibial, flexor digitorum longus, flexor hallucis longus and extensor tendons are normal.     Ligaments: There is slight irregularity at the fibular insertion of the anterior talofibular ligament, presumably related to remote trauma.  Posterior talofibular, calcaneofibular, deltoid, spring and visualized portions of the Lisfranc ligament are normal.  Bifurcate, dorsal talonavicular and dorsal calcaneocuboid ligaments are intact.     Bones: No fractures.  No avascular necrosis.  No marrow infiltrative process.     Cartilage: No osteochondral lesions.  No partial or full-thickness cartilage defects.  No imaging findings to suggest a tarsal coalition.     Muscles: No accessory muscles.  Muscle bulk is preserved.     Miscellaneous: There is thickening and increased signal intensity of the central cord of the plantar fascia with associated edema of the calcaneus, flexor digitorum brevis and adjacent heel pad.  No full-thickness tear or retraction.  Tarsal tunnel is normal.  Sinus tarsi demonstrates slight increased signal intensity with grossly intact cervical and interosseous ligaments.  There is trace fluid within the retrocalcaneal bursa.     Impression:     1. MR imaging findings consistent with plantar fasciitis noting thickening and increased signal intensity within the central cord of the plantar fascia with associated edema of the flexor digitorum brevis, calcaneus and adjacent heel pad.  2. Distal Achilles tendinosis with low-grade interstitial tearing and associated peritendinitis.  Trace fluid within the retrocalcaneal bursa suggests bursitis.  3. Trace peroneal tenosynovitis.  4. Irregularity at the fibular insertion of the anterior talofibular ligament suggesting sequela of a remote injury.   No full-thickness tear.        Electronically signed by: Chapin Singleton MD  Date:                                            09/12/2019  Time:                                           13:11          Assessment:     Encounter Diagnosis   Name Primary?    Chronic pain syndrome        Plan:     Diagnoses and all orders for this visit:    Chronic pain syndrome  -     Ambulatory referral[facility]consult to [facility]          Yanni Kaiser continues to deal with chronic pain that impacts her function and QOL despite medical/interventional tx. She is not a surgical candidate. Discussed FRP and importance of chronic pain self-management. Pain education provided.     Since she is enrolled in water PT and tolerating well thus far I would recommend continuing with this. I will contact her around Feb 23- the date of her last scheduled PT visit. Will check in with her at that time and if she is interested she can come in for FRP PT/OT screenings.     I spent a total of 45 minutes with the patient, and greater than 50% of the time was spent in counseling and education.     The above plan and management options were discussed at length with patient. Patient is in agreement with the above and verbalized understanding. It will be communicated with the referring physician via electronic record, fax, or mail.

## 2022-01-27 ENCOUNTER — CLINICAL SUPPORT (OUTPATIENT)
Dept: REHABILITATION | Facility: HOSPITAL | Age: 52
End: 2022-01-27
Attending: NEUROLOGICAL SURGERY
Payer: MEDICARE

## 2022-01-27 DIAGNOSIS — G89.29 CHRONIC LEFT-SIDED LOW BACK PAIN WITH LEFT-SIDED SCIATICA: ICD-10-CM

## 2022-01-27 DIAGNOSIS — M54.42 CHRONIC LEFT-SIDED LOW BACK PAIN WITH LEFT-SIDED SCIATICA: ICD-10-CM

## 2022-01-27 PROCEDURE — 97113 AQUATIC THERAPY/EXERCISES: CPT | Mod: HCNC

## 2022-01-27 NOTE — PROGRESS NOTES
[facility]   Physical Therapy Treatment Note     Name: Yanni Schwartz Lemon  Clinic Number: [id number]    Therapy Diagnosis:   Encounter Diagnosis   Name Primary?    Chronic left-sided low back pain with left-sided sciatica      Physician: Will Prabhakar MD    Visit Date: 1/27/2022      Physician: Will Prabhakar MD     Physician Orders: PT Eval and Treat   Medical Diagnosis from Referral:   E66.01,Z68.44 (ICD-10-CM) - Morbid obesity with BMI of 60.0-69.9, adult   M54.6 (ICD-10-CM) - Acute bilateral thoracic back pain   M54.17 (ICD-10-CM) - Lumbosacral radiculopathy at L4      Evaluation Date: 1/4/2022  Authorization Period Expiration: 03/31/2022  Plan of Care Expiration: 3/31/2022  Visit # / Visits authorized: 7/20 + Eval  PTA Visit #: 0/6     Time In: 10:00 am   Time Out: 1100 am   Total Billable Time: 30 minutes    SUBJECTIVE     Pt reports: feeling good in her ankle today but does have a sinus headache.     She was compliant with home exercise program.  Response to previous treatment: fatigue  Functional change: none at this time    Pain: 9/10  Location: bilateral back     OBJECTIVE     Objective Measures updated at progress report unless specified.     Treatment     Yanni received aquatic therapeutic exercises to develop strength, endurance, ROM, flexibility, posture, and core stabilization for 60 minutes including:    FUNCTIONAL MOBILITY TRAINING x 2 laps each at beginning and 1 lap each at end of session  Walk forward/backward/lateral    STRETCHES 2 x 30sec  Calf  hamstring    LE EX x 30 2.5# ankle weights   Squat  Heel raise with gluteal set  Hip abduction/adduction with B yellow db  Hip flex/ext with B yellow db  LAQ  Leg press aqua noodle/blue tubing  Tripod clams    // Bars balance  SL balance ---with blue yuk ball dribble 2x each side to fatigue  Tandem walk in // bars 2 laps--NP  Wobble board 2' without use of hands for support (advance next visit with addition of  UE movements as tolerated) increased difficulty today with added ankle weights    Step ups on pool step x30 ea (advance to red step next visit)  Theraband sidestepping with green theraband x 2 laps   Theraband monster walks with green theraband x 2 laps    UE EX/CORE  x30  Mini squat with push/pull red kickboard with back at wall  B shoulder row with green theratubing   B shoulder extension with green theratubing    ENDURANCE  Bicycle in // bars 5'  LTR x 2' in // bars  DKTC in // bars 2'    Patient Education and Home Exercises     Home Exercises Provided and Patient Education Provided     Education provided:   Role of aquatic therapy  Hydration post therapy    Written Home Exercises Provided: Patient instructed to cont prior HEP. Exercises were reviewed and Yanni was able to demonstrate them prior to the end of the session.  Yanni demonstrated good  understanding of the education provided. See EMR under Patient Instructions for exercises provided during therapy sessions    ASSESSMENT     The patient tolerated the addition of ankle weights well today and was able to perform all exercises despite headache.      Yanni Is progressing well towards her goals.   Pt prognosis is Good.     Pt will continue to benefit from skilled outpatient physical therapy to address the deficits listed in the problem list box on initial evaluation, provide pt/family education and to maximize pt's level of independence in the home and community environment.     Pt's spiritual, cultural and educational needs considered and pt agreeable to plan of care and goals.     Anticipated barriers to physical therapy: none at this time    Goals:     1.Report decreased low back pain  < / =  3/10  to increase tolerance for exercise  2. Increase ROM by 15 degrees where limited in order to perform ADLs without difficulty.  3. Increase strength by 1/3 MMT grade in gluteal strength  to increase tolerance for ADL and work activities.  4. Pt to  tolerate HEP to improve ROM and independence with ADL's     Long Term Goals: 12 to 24 weeks  1.Report decreased low back  pain < / = 2/10  to increase tolerance for ADLs  2.Patient goal: Reduce pain  3.Increase strength to 4+/5 in  Gluteal muscles  to increase tolerance for ADL and work activities.    PLAN     Progress POC as tolerated by the patient.    Arti Rodriguez, PT

## 2022-01-28 ENCOUNTER — HOSPITAL ENCOUNTER (OUTPATIENT)
Facility: OTHER | Age: 52
Discharge: HOME | End: 2022-01-28
Attending: ANESTHESIOLOGY | Admitting: ANESTHESIOLOGY
Payer: MEDICARE

## 2022-01-28 ENCOUNTER — PATIENT MESSAGE (OUTPATIENT)
Dept: FAMILY MEDICINE | Facility: CLINIC | Age: 52
End: 2022-01-28
Payer: MEDICARE

## 2022-01-28 VITALS
BODY MASS INDEX: 50.02 KG/M2 | OXYGEN SATURATION: 95 % | HEIGHT: 64 IN | RESPIRATION RATE: 16 BRPM | WEIGHT: 293 LBS | DIASTOLIC BLOOD PRESSURE: 99 MMHG | TEMPERATURE: 99 F | SYSTOLIC BLOOD PRESSURE: 166 MMHG | HEART RATE: 70 BPM

## 2022-01-28 DIAGNOSIS — M54.50 CHRONIC BILATERAL LOW BACK PAIN WITHOUT SCIATICA: Primary | ICD-10-CM

## 2022-01-28 DIAGNOSIS — G89.29 CHRONIC PAIN: ICD-10-CM

## 2022-01-28 DIAGNOSIS — G89.29 CHRONIC BILATERAL LOW BACK PAIN WITHOUT SCIATICA: Primary | ICD-10-CM

## 2022-01-28 LAB
POCT GLUCOSE: 105 MG/DL (ref 70–110)
POCT GLUCOSE: 79 MG/DL (ref 70–110)

## 2022-01-28 PROCEDURE — 64483 NJX AA&/STRD TFRM EPI L/S 1: CPT | Mod: 50,HCNC | Performed by: ANESTHESIOLOGY

## 2022-01-28 PROCEDURE — 99152 PR MOD CONSCIOUS SEDATION, SAME PHYS, 5+ YRS, FIRST 15 MIN: ICD-10-PCS | Mod: HCNC,,, | Performed by: ANESTHESIOLOGY

## 2022-01-28 PROCEDURE — 25000003 PHARM REV CODE 250: Mod: HCNC | Performed by: ANESTHESIOLOGY

## 2022-01-28 PROCEDURE — 64483 NJX AA&/STRD TFRM EPI L/S 1: CPT | Mod: 50,HCNC,, | Performed by: ANESTHESIOLOGY

## 2022-01-28 PROCEDURE — 64483 PR EPIDURAL INJ, ANES/STEROID, TRANSFORAMINAL, LUMB/SACR, SNGL LEVL: ICD-10-PCS | Mod: 50,HCNC,, | Performed by: ANESTHESIOLOGY

## 2022-01-28 PROCEDURE — 99152 MOD SED SAME PHYS/QHP 5/>YRS: CPT | Mod: HCNC | Performed by: ANESTHESIOLOGY

## 2022-01-28 PROCEDURE — 25500020 PHARM REV CODE 255: Mod: HCNC | Performed by: ANESTHESIOLOGY

## 2022-01-28 PROCEDURE — 63600175 PHARM REV CODE 636 W HCPCS: Mod: HCNC | Performed by: ANESTHESIOLOGY

## 2022-01-28 PROCEDURE — 99152 MOD SED SAME PHYS/QHP 5/>YRS: CPT | Mod: HCNC,,, | Performed by: ANESTHESIOLOGY

## 2022-01-28 RX ORDER — MIDAZOLAM HYDROCHLORIDE 1 MG/ML
INJECTION INTRAMUSCULAR; INTRAVENOUS
Status: DISCONTINUED | OUTPATIENT
Start: 2022-01-28 | End: 2022-01-28 | Stop reason: HOSPADM

## 2022-01-28 RX ORDER — SODIUM CHLORIDE 9 MG/ML
INJECTION, SOLUTION INTRAVENOUS
Status: COMPLETED | OUTPATIENT
Start: 2022-01-28 | End: 2022-01-28

## 2022-01-28 RX ORDER — FENTANYL CITRATE 50 UG/ML
INJECTION, SOLUTION INTRAMUSCULAR; INTRAVENOUS
Status: DISCONTINUED | OUTPATIENT
Start: 2022-01-28 | End: 2022-01-28 | Stop reason: HOSPADM

## 2022-01-28 RX ORDER — EMPAGLIFLOZIN 10 MG/1
TABLET, FILM COATED ORAL
Qty: 30 TABLET | Refills: 1 | Status: SHIPPED | OUTPATIENT
Start: 2022-01-28 | End: 2022-04-18

## 2022-01-28 RX ORDER — LIDOCAINE HYDROCHLORIDE 10 MG/ML
INJECTION, SOLUTION EPIDURAL; INFILTRATION; INTRACAUDAL; PERINEURAL
Status: DISCONTINUED | OUTPATIENT
Start: 2022-01-28 | End: 2022-01-28 | Stop reason: HOSPADM

## 2022-01-28 RX ORDER — DEXAMETHASONE SODIUM PHOSPHATE 10 MG/ML
INJECTION INTRAMUSCULAR; INTRAVENOUS
Status: DISCONTINUED | OUTPATIENT
Start: 2022-01-28 | End: 2022-01-28 | Stop reason: HOSPADM

## 2022-01-28 RX ORDER — MONTELUKAST SODIUM 10 MG/1
TABLET ORAL
Qty: 30 TABLET | Refills: 2 | Status: SHIPPED | OUTPATIENT
Start: 2022-01-28 | End: 2022-02-17

## 2022-01-28 RX ORDER — ALPRAZOLAM 0.5 MG/1
0.5 TABLET ORAL
Status: DISCONTINUED | OUTPATIENT
Start: 2022-01-28 | End: 2022-01-28 | Stop reason: HOSPADM

## 2022-01-28 RX ORDER — LIDOCAINE HYDROCHLORIDE 20 MG/ML
INJECTION, SOLUTION INFILTRATION; PERINEURAL
Status: DISCONTINUED | OUTPATIENT
Start: 2022-01-28 | End: 2022-01-28 | Stop reason: HOSPADM

## 2022-01-28 NOTE — PLAN OF CARE
Pt tolerated procedure well. Pt reports 8/10 pain after procedure. Assisted pt up for first time. Steady on feet. All discharge instructions given.  Pt advised to let primary care MD know about elevated blood pressure.  Resident notified about pt's elevated BP and headache.

## 2022-01-28 NOTE — DISCHARGE INSTRUCTIONS
Thank you for allowing us to care for you today. You may receive a survey about the care we provided. Your feedback is valuable and helps us provide excellent care throughout the community.     Home Care Instructions for Pain Management:    1. DIET:   You may resume your normal diet today.   2. BATHING:   You may shower with luke warm water. No tub baths or anything that will soak injection sites under water for the next 24 hours.  3. DRESSING:   You may remove your bandage today.   4. ACTIVITY LEVEL:   You may resume your normal activities 24 hrs after your procedure. Nothing strenuous today.  5. MEDICATIONS:   You may resume your normal medications today. To restart blood thinners, ask your doctor.  6. DRIVING    If you have received any sedatives by mouth today, you may not drive for 12 hours.    If you have received any sedation through your IV, you may not drive for 24 hrs.   7. SPECIAL INSTRUCTIONS:   No heat to the injection site for 24 hrs including, hot bath or shower, heating pad, moist heat, or hot tubs.    Use ice pack to injection site for any pain or discomfort.  Apply ice packs for 20 minute intervals as needed.    IF you have diabetes, be sure to monitor your blood sugar more closely. IF your injection contained steroids your blood sugar levels may become higher than normal.    If you are still having pain upon discharge:  Your pain may improve over the next 48 hours. The anesthetic (numbing medication) works immediately to 48 hours. IF your injection contained a steroid (anti-inflammatory medication), it takes approximately 3 days to start feeling relief and 7-10 days to see your greatest results from the medication. It is possible you may need subsequent injections. This would be discussed at your follow up appointment with pain management or your referring doctor.    Please call the PAIN MANAGEMENT office at [phone] or ON CALL pager at [phone] if you experienced any:   -Weakness or  loss of sensation  -Fever > 101.5  -Pain uncontrolled with oral medications   -Persistent nausea, vomiting, or diarrhea  -Redness or drainage from the injection sites, or any other worrisome concerns.    If physician on call was not reached or could not communicate with our office for any reason please go to the nearest emergency department.    Adult Procedural Sedation Instructions    Recovery After Procedural Sedation (Adult)  You have been given medicine by vein to make you sleep during your surgery. This may have included both a pain medicine and sleeping medicine. Most of the effects have worn off. But you may still have some drowsiness for the next 6 to 8 hours.  Home care  Follow these guidelines when you get home:  · For the next 8 hours, you should be watched by a responsible adult. This person should make sure your condition is not getting worse.  · Don't drink any alcohol for the next 24 hours.  · Don't drive, operate dangerous machinery, or make important business or personal decisions during the next 24 hours.  Note: Your healthcare provider may tell you not to take any medicine by mouth for pain or sleep in the next 4 hours. These medicines may react with the medicines you were given in the hospital. This could cause a much stronger response than usual.  Follow-up care  Follow up with your healthcare provider if you are not alert and back to your usual level of activity within 12 hours.  When to seek medical advice  Call your healthcare provider right away if any of these occur:  · Drowsiness gets worse  · Weakness or dizziness gets worse  · Repeated vomiting  · You can't be awakened   Date Last Reviewed: 10/18/2016  © 2666-8927 [employer]. [address] [place] [address], [place] [address]. All rights reserved. This information is not intended as a substitute for professional medical care. Always follow your healthcare professional's instructions.

## 2022-01-28 NOTE — OP NOTE
Lumbar Transforaminal Epidural Steroid Injection under Fluoroscopic Guidance    The procedure, risks, benefits, and options were discussed with the patient. There are no contraindications to the procedure. The patent expressed understanding and agreed to the procedure. Informed written consent was obtained prior to the start of the procedure and can be found in the patient's chart.    PATIENT NAME: Mrs. Yanni Kaiser   MRN: [id number]     DATE OF PROCEDURE: 01/28/2022    PROCEDURE:  Bilateral  L4/5 Lumbar Transforaminal Epidural Steroid Injection under Fluoroscopic Guidance    PRE-OP DIAGNOSIS: Lumbar radiculopathy [M54.16]    POST-OP DIAGNOSIS: Same    PHYSICIAN: Lawrence Marin MD    ASSISTANTS: Dr. Rodriguez     MEDICATIONS INJECTED: Preservative-free Decadron 10mg with 5cc of Lidocaine 1% MPF     LOCAL ANESTHETIC INJECTED: Xylocaine 2%     SEDATION:   Versed 2mg and Fentanyl 50mcg                                                                                                                                                                                     Conscious sedation ordered by M.D. Patient re-evaluation prior to administration of conscious sedation. No changes noted in patient's status from initial evaluation. The patient's vital signs were monitored by RN and patient remained hemodynamically stable throughout the procedure.    Event Time In   Sedation Start 1152   Sedation End 1202       ESTIMATED BLOOD LOSS: None    COMPLICATIONS: None    TECHNIQUE: Time-out was performed to identify the patient and procedure to be performed. With the patient laying in a prone position, the surgical area was prepped and draped in the usual sterile fashion using ChloraPrep and a fenestrated drape.The levels were determined under fluoroscopy guidance. Skin anesthesia was achieved by injecting Lidocaine 2% over the injection sites. The transforaminal spaces were then approached with a 22 gauge, 7 inch spinal quinke needle  that was introduced under fluoroscopic guidance in the AP and Lateral views. Once the needle tip was in the area of the transforaminal space, and there was no blood, CSF or paraesthesias, contrast dye Omnipaque (300mg/ml) was injected to confirm placement and there was no vascular runoff. Fluoroscopic imaging in the AP and lateral views revealed a clear outline of the spinal nerve with proximal spread of agent through the neural foramen into the epidural space. 3 mL of the medication mixture listed above was injected slowly at each site. Displacement of the radio opaque contrast after injection of the medication confirmed that the medication went into the area of the transforaminal spaces. The needles were removed and bleeding was nil. A sterile dressing was applied. No specimens collected. The patient tolerated the procedure well.       The patient was monitored after the procedure in the recovery area. They were given post-procedure and discharge instructions to follow at home. The patient was discharged in a stable condition.    Lawrence Marin MD

## 2022-01-28 NOTE — DISCHARGE SUMMARY
Discharge Note  Short Stay      SUMMARY     Admit Date: 1/28/2022    Attending Physician: Lawrence Marin      Discharge Physician: Lawrence Marin      Discharge Date: 1/28/2022 12:02 PM    Procedure(s) (LRB):  INJECTION, STEROID, EPIDURAL, TRANSFORAMINAL APPROACH  Bilateral TFESI L4/L5      NEEDS CONSENT (Bilateral)    Final Diagnosis: Lumbar radiculopathy [M54.16]    Disposition: Home or self care    Patient Instructions:   Current Discharge Medication List      CONTINUE these medications which have NOT CHANGED    Details   albuterol (PROVENTIL) 2.5 mg /3 mL (0.083 %) nebulizer solution Take 3 mLs (2.5 mg total) by nebulization every 6 (six) hours as needed for Wheezing. Rescue  Qty: 3 mL, Refills: 11    Associated Diagnoses: Asthma, unspecified asthma severity, unspecified whether complicated, unspecified whether persistent      albuterol (PROVENTIL/VENTOLIN HFA) 90 mcg/actuation inhaler Inhale 2 puffs into the lungs every 6 (six) hours as needed for Wheezing or Shortness of Breath. Rescue  Qty: 54 g, Refills: 6    Comments: **Patient requests 90 days supply**  Associated Diagnoses: Asthma, unspecified asthma severity, unspecified whether complicated, unspecified whether persistent      alcohol swabs (BD ALCOHOL SWABS) PadM Apply 1 each topically 2 (two) times a day.  Qty: 200 each, Refills: 4      allerg xt,D.farinae-D.pteronys (ODACTRA) 12 SQ-HDM Subl Place 1 tablet under the tongue once daily.  Qty: 30 tablet, Refills: 11    Associated Diagnoses: Allergic rhinitis due to [employer] house dust mite      ammonium lactate 12 % Crea Apply twice daily to affected parts both feet as needed.  Qty: 140 g, Refills: 11      azelastine (ASTELIN) 137 mcg (0.1 %) nasal spray 1 spray (137 mcg total) by Nasal route 2 (two) times daily.  Qty: 30 mL, Refills: 11    Associated Diagnoses: Chronic sinusitis, unspecified location      blood sugar diagnostic Strp 1 each by Misc.(Non-Drug; Combo Route) route 4 (four) times daily  before meals and nightly. ACCU CHEK ELVIA PLUS METER  Qty: 200 each, Refills: 3      blood-glucose meter (ACCU-CHEK ELVIA PLUS METER) Misc TEST FOUR TIMES DAILY BEFORE MEALS AND EVERY NIGHT  Qty: 90 each, Refills: 1    Associated Diagnoses: Type 2 diabetes mellitus with stage 3 chronic kidney disease, with long-term current use of insulin      budesonide-formoterol 160-4.5 mcg (SYMBICORT) 160-4.5 mcg/actuation HFAA Inhale 2 puffs into the lungs every 12 (twelve) hours. Controller  Qty: 1 Inhaler, Refills: 5    Associated Diagnoses: Asthma, unspecified asthma severity, unspecified whether complicated, unspecified whether persistent      butalbital-acetaminophen-caffeine -40 mg (FIORICET, ESGIC) -40 mg per tablet Take 1 tablet by mouth every 4 (four) hours as needed for Pain.  Qty: 12 tablet, Refills: 0      cetirizine (ZYRTEC) 10 MG tablet Take 1 tablet (10 mg total) by mouth daily as needed for Allergies (itching).  Qty: 30 tablet, Refills: 0    Associated Diagnoses: Itching      !! diclofenac sodium (VOLTAREN) 1 % Gel Apply 2 g topically 4 (four) times daily.  Qty: 1 Tube, Refills: 2      !! diclofenac sodium (VOLTAREN) 1 % Gel Apply 2 g topically 2 (two) times daily.  Qty: 100 g, Refills: 2      doxycycline (VIBRA-TABS) 100 MG tablet Take 1 tablet (100 mg total) by mouth 2 (two) times daily.  Qty: 20 tablet, Refills: 0    Associated Diagnoses: Chronic maxillary sinusitis      EPINEPHrine (EPIPEN) 0.3 mg/0.3 mL AtIn Inject 0.3 mLs (0.3 mg total) into the muscle once. for 1 dose  Qty: 2 each, Refills: 1      JARDIANCE 10 mg tablet TAKE 1 TABLET(10 MG) BY MOUTH EVERY DAY  Qty: 30 tablet, Refills: 1    Associated Diagnoses: Type 2 diabetes mellitus with stage 3a chronic kidney disease, with long-term current use of insulin      !! lancets (ACCU-CHEK FASTCLIX LANCET DRUM) Misc 1 Device by Misc.(Non-Drug; Combo Route) route 2 (two) times a day.  Qty: 200 each, Refills: 4      !! lancets (ACCU-CHEK SOFTCLIX  LANCETS) Misc 1 Device by Misc.(Non-Drug; Combo Route) route 4 (four) times daily.  Qty: 200 each, Refills: 3      levocetirizine (XYZAL) 5 MG tablet Take 1 tablet (5 mg total) by mouth every evening.  Qty: 90 tablet, Refills: 3    Associated Diagnoses: Pruritus sine materia      lidocaine (LIDODERM) 5 % Place 1 patch onto the skin once daily. Remove & Discard patch within 12 hours or as directed by MD  Qty: 15 patch, Refills: 0      LIDOcaine HCL 2% (XYLOCAINE) 2 % jelly Apply topically as needed. Apply topically once nightly to affected part of foot/feet.  Qty: 30 mL, Refills: 2      losartan (COZAAR) 100 MG tablet Take 1 tablet (100 mg total) by mouth once daily.  Qty: 90 tablet, Refills: 0    Comments: .  Associated Diagnoses: Benign essential HTN      lubiprostone (AMITIZA) 24 MCG Cap       montelukast (SINGULAIR) 10 mg tablet TAKE 1 TABLET(10 MG) BY MOUTH EVERY EVENING  Qty: 30 tablet, Refills: 2      MOVANTIK 12.5 mg Tab Take 1 tablet by mouth once daily.      nicotine (NICODERM CQ) 21 mg/24 hr Place 1 patch onto the skin once daily.  Qty: 28 patch, Refills: 0    Comments: Patient is a member of the [employer] SCT#[id number] Please charge Rx Bin [id number] Rx Group [id number] PLEASE DISPENSE TAN COLORED PATCH ONLY      nicotine, polacrilex, (NICORETTE) 2 mg Gum Take 1 each (2 mg total) by mouth as needed (Take 1 piece as needed.  Maximum of 10 per day.).  Qty: 220 each, Refills: 0    Comments: Patient is a member of the [employer] SCT#[id number] Please charge Rx Bin [id number] Rx Group [id number] PLEASE DISPENSE MINT FLAVOR ONLY      ondansetron (ZOFRAN) 8 MG tablet Take 1 tablet (8 mg total) by mouth every 8 (eight) hours as needed for Nausea.  Qty: 90 tablet, Refills: 11    Associated Diagnoses: Nausea and vomiting, intractability of vomiting not specified, unspecified vomiting type      ondansetron (ZOFRAN-ODT) 4 MG TbDL Dissolve 1 tablet (4 mg total) by mouth every 8 (eight) hours as needed  (nausea).  Qty: 15 tablet, Refills: 0      oxyCODONE-acetaminophen (PERCOCET) 7.5-325 mg per tablet Take 1 tablet by mouth 3 (three) times daily as needed for Pain.  Qty: 90 tablet, Refills: 0    Comments: Medically necessary for > 7 days due to chronic pain.  Associated Diagnoses: Fibromyalgia syndrome; Chronic midline low back pain with bilateral sciatica      OZEMPIC 1 mg/dose (4 mg/3 mL) Inject 1 mg into the skin every 7 days.  Qty: 3 pen, Refills: 0    Associated Diagnoses: Type 2 diabetes mellitus with stage 3a chronic kidney disease, with long-term current use of insulin      pantoprazole (PROTONIX) 40 MG tablet TAKE 1 TABLET(40 MG) BY MOUTH TWICE DAILY  Qty: 60 tablet, Refills: 6      pregabalin (LYRICA) 50 MG capsule Take 1 capsule (50 mg total) by mouth 3 (three) times daily.  Qty: 90 capsule, Refills: 3      QUEtiapine (SEROQUEL) 25 MG Tab Take 1 tablet (25 mg total) by mouth once daily.  Qty: 90 tablet, Refills: 0    Associated Diagnoses: Severe episode of recurrent major depressive disorder, with psychotic features      sodium chloride (SALINE NASAL) 0.65 % nasal spray 2 sprays by Nasal route as needed for Congestion.  Qty: 104 mL, Refills: 12    Associated Diagnoses: Chronic sinusitis, unspecified location      triamcinolone acetonide 0.1% (KENALOG) 0.1 % ointment Apply topically 2 (two) times daily as needed (rash).  Qty: 1 each, Refills: 0    Associated Diagnoses: Rash/skin eruption      TROKENDI  mg Cp24       valACYclovir (VALTREX) 1000 MG tablet Take 1 tablet (1,000 mg total) by mouth 2 (two) times daily. for 7 days  Qty: 14 tablet, Refills: 0    Associated Diagnoses: Rash/skin eruption      venlafaxine (EFFEXOR-XR) 75 MG 24 hr capsule Take 1 capsule (75 mg total) by mouth once daily.  Qty: 90 capsule, Refills: 1    Associated Diagnoses: Migraine without aura and without status migrainosus, not intractable       !! - Potential duplicate medications found. Please discuss with provider.               Discharge Diagnosis: Lumbar radiculopathy [M54.16]  Condition on Discharge: Stable with no complications to procedure   Diet on Discharge: Same as before.  Activity: as per instruction sheet.  Discharge to: Home with a responsible adult.  Follow up: 2-4 weeks

## 2022-01-31 ENCOUNTER — OFFICE VISIT (OUTPATIENT)
Dept: UROGYNECOLOGY | Facility: CLINIC | Age: 52
End: 2022-01-31
Payer: MEDICARE

## 2022-01-31 VITALS
DIASTOLIC BLOOD PRESSURE: 80 MMHG | BODY MASS INDEX: 50.02 KG/M2 | WEIGHT: 293 LBS | SYSTOLIC BLOOD PRESSURE: 132 MMHG | HEIGHT: 64 IN

## 2022-01-31 DIAGNOSIS — N39.46 MIXED INCONTINENCE URGE AND STRESS: Primary | ICD-10-CM

## 2022-01-31 DIAGNOSIS — N89.8 VAGINAL DISCHARGE: ICD-10-CM

## 2022-01-31 DIAGNOSIS — N81.89 PELVIC FLOOR WEAKNESS: ICD-10-CM

## 2022-01-31 DIAGNOSIS — Z12.4 ENCOUNTER FOR SCREENING FOR CERVICAL CANCER: ICD-10-CM

## 2022-01-31 DIAGNOSIS — E66.01 MORBID OBESITY WITH BMI OF 60.0-69.9, ADULT: ICD-10-CM

## 2022-01-31 DIAGNOSIS — N95.2 VAGINAL ATROPHY: ICD-10-CM

## 2022-01-31 DIAGNOSIS — K59.00 CONSTIPATION, UNSPECIFIED CONSTIPATION TYPE: ICD-10-CM

## 2022-01-31 PROCEDURE — 3008F PR BODY MASS INDEX (BMI) DOCUMENTED: ICD-10-PCS | Mod: HCNC,CPTII,S$GLB, | Performed by: NURSE PRACTITIONER

## 2022-01-31 PROCEDURE — 87481 CANDIDA DNA AMP PROBE: CPT | Mod: 59,HCNC | Performed by: NURSE PRACTITIONER

## 2022-01-31 PROCEDURE — 99999 PR PBB SHADOW E&M-EST. PATIENT-LVL V: ICD-10-PCS | Mod: PBBFAC,HCNC,, | Performed by: NURSE PRACTITIONER

## 2022-01-31 PROCEDURE — 99499 RISK ADDL DX/OHS AUDIT: ICD-10-PCS | Mod: S$GLB,,, | Performed by: NURSE PRACTITIONER

## 2022-01-31 PROCEDURE — 87624 HPV HI-RISK TYP POOLED RSLT: CPT | Mod: HCNC | Performed by: NURSE PRACTITIONER

## 2022-01-31 PROCEDURE — 3075F PR MOST RECENT SYSTOLIC BLOOD PRESS GE 130-139MM HG: ICD-10-PCS | Mod: HCNC,CPTII,S$GLB, | Performed by: NURSE PRACTITIONER

## 2022-01-31 PROCEDURE — 99214 OFFICE O/P EST MOD 30 MIN: CPT | Mod: 25,HCNC,S$GLB, | Performed by: NURSE PRACTITIONER

## 2022-01-31 PROCEDURE — 3008F BODY MASS INDEX DOCD: CPT | Mod: HCNC,CPTII,S$GLB, | Performed by: NURSE PRACTITIONER

## 2022-01-31 PROCEDURE — 99214 PR OFFICE/OUTPT VISIT, EST, LEVL IV, 30-39 MIN: ICD-10-PCS | Mod: 25,HCNC,S$GLB, | Performed by: NURSE PRACTITIONER

## 2022-01-31 PROCEDURE — 99499 UNLISTED E&M SERVICE: CPT | Mod: S$GLB,,, | Performed by: NURSE PRACTITIONER

## 2022-01-31 PROCEDURE — 51701 PR INSERTION OF NON-INDWELLING BLADDER CATHETERIZATION FOR RESIDUAL UR: ICD-10-PCS | Mod: HCNC,S$GLB,, | Performed by: NURSE PRACTITIONER

## 2022-01-31 PROCEDURE — 88175 CYTOPATH C/V AUTO FLUID REDO: CPT | Mod: HCNC | Performed by: NURSE PRACTITIONER

## 2022-01-31 PROCEDURE — 1160F PR REVIEW ALL MEDS BY PRESCRIBER/CLIN PHARMACIST DOCUMENTED: ICD-10-PCS | Mod: HCNC,CPTII,S$GLB, | Performed by: NURSE PRACTITIONER

## 2022-01-31 PROCEDURE — 87086 URINE CULTURE/COLONY COUNT: CPT | Mod: HCNC | Performed by: NURSE PRACTITIONER

## 2022-01-31 PROCEDURE — 3075F SYST BP GE 130 - 139MM HG: CPT | Mod: HCNC,CPTII,S$GLB, | Performed by: NURSE PRACTITIONER

## 2022-01-31 PROCEDURE — 3079F PR MOST RECENT DIASTOLIC BLOOD PRESSURE 80-89 MM HG: ICD-10-PCS | Mod: HCNC,CPTII,S$GLB, | Performed by: NURSE PRACTITIONER

## 2022-01-31 PROCEDURE — 99999 PR PBB SHADOW E&M-EST. PATIENT-LVL V: CPT | Mod: PBBFAC,HCNC,, | Performed by: NURSE PRACTITIONER

## 2022-01-31 PROCEDURE — 87801 DETECT AGNT MULT DNA AMPLI: CPT | Mod: HCNC | Performed by: NURSE PRACTITIONER

## 2022-01-31 PROCEDURE — 1160F RVW MEDS BY RX/DR IN RCRD: CPT | Mod: HCNC,CPTII,S$GLB, | Performed by: NURSE PRACTITIONER

## 2022-01-31 PROCEDURE — 1159F MED LIST DOCD IN RCRD: CPT | Mod: HCNC,CPTII,S$GLB, | Performed by: NURSE PRACTITIONER

## 2022-01-31 PROCEDURE — 51701 INSERT BLADDER CATHETER: CPT | Mod: HCNC,S$GLB,, | Performed by: NURSE PRACTITIONER

## 2022-01-31 PROCEDURE — 3079F DIAST BP 80-89 MM HG: CPT | Mod: HCNC,CPTII,S$GLB, | Performed by: NURSE PRACTITIONER

## 2022-01-31 PROCEDURE — 1159F PR MEDICATION LIST DOCUMENTED IN MEDICAL RECORD: ICD-10-PCS | Mod: HCNC,CPTII,S$GLB, | Performed by: NURSE PRACTITIONER

## 2022-01-31 RX ORDER — FLUTICASONE PROPIONATE 50 MCG
SPRAY, SUSPENSION (ML) NASAL
COMMUNITY
Start: 2021-11-01 | End: 2022-03-08 | Stop reason: SDUPTHER

## 2022-01-31 RX ORDER — ESTRADIOL 0.1 MG/G
CREAM VAGINAL
Qty: 42.5 G | Refills: 3 | Status: ON HOLD | OUTPATIENT
Start: 2022-01-31 | End: 2022-09-09 | Stop reason: HOSPADM

## 2022-01-31 RX ORDER — FLUCONAZOLE 150 MG/1
150 TABLET ORAL
Qty: 3 TABLET | Refills: 0 | Status: SHIPPED | OUTPATIENT
Start: 2022-01-31 | End: 2022-02-05

## 2022-01-31 RX ORDER — CYCLOBENZAPRINE HCL 10 MG
10 TABLET ORAL 3 TIMES DAILY
COMMUNITY
Start: 2021-11-01 | End: 2022-04-12 | Stop reason: SDUPTHER

## 2022-01-31 NOTE — PATIENT INSTRUCTIONS
1.  Mixed urinary incontinence, urge > stress:    --Empty bladder every 3 hours.  Empty well: wait a minute, lean forward on toilet.    --Avoid dietary irritants (see sheet).  Keep diary x 3-5 days to determine your irritants.  --start pelvic floor PT with  LEYDI Davis/Ester Bull: (p) [phone]  . (f) [phone].    --URGE: consider anticholinergic. Takes 2-4 weeks to see if will have effect.  For dry mouth: get sour, sugar free lozenge or gum.    --STRESS:  Pessary vs. Sling.   --urine culture today  --important to keep blood glucose in normal range    2. Vaginal atrophy (dryness):  Use 1 gram of estrogen cream in vagina nightly x 2 weeks, then twice a week thereafter.     3. Constipation:  --Start daily fiber.  Take 1 tsp of fiber powder (psyllium or other sugar-free powder).  Mix in 8 oz of water.  Take x 3-5 days.  Then, increase fiber by 1 tsp every 3-5 days until stool is easy to pass.  Stop and continue at that dose.   Do not exceed 6 tsps/day.  May also use over the counter stool softener 1-2 x/day.  AVOID laxatives.  --continue follow up with GI-- may need to call to schedule    4. Obesity  --bariatrics scheduled  --medical fitness plan referral placed    5. Vaginal discharge  --affirm today  --diflucan 150 mg every other day x 3 doses    6. Well woman  --pap today  --takes 2-3 weeks for results    7. --Nocturia (nighttime urination): stop fluids 2 hours before bed.  If have leg swelling:  Elevate feet above chest x 1 hour before bed to get excess fluid off.  Can also use support hose (knee highs).      8. RTC 4 months for follow up      Bladder Irritants  Certain foods and drinks have been associated with worsening symptoms of urinary frequency, urgency, urge incontinence, or  bladder pain. If you suffer from any of these conditions, you may wish to try eliminating one or more of these foods from your  diet and see if your symptoms improve. If bladder symptoms are related to dietary factors, strict  adherence to a diet that  eliminates the food should bring marked relief in 10 days. Once you are feeling better, you can begin to add foods back into  your diet, one at a time. If symptoms return, you will be able to identify the irritant. As you add foods back to your diet it is  very important that you drink significant amounts of water.  Low-acid fruit substitutions include apricots, papaya, pears and watermelon. Coffee drinkers can drink Kava or other lowacid  instant drinks. Tea drinkers can substitute non-citrus herbal and sun brewed teas. Calcium carbonate co-buffered with  calcium ascorbate can be substituted for Vitamin C. Prelief is a dietary supplement that works as an acid blocker for the  bladder.  Where to get more information:   Overcoming Bladder Disorders by Haydee Higginbotham and Sherif Golden, 1990   You Dont Have to Live with Cystitis! By Mariah Andrews, 1988  List of Common Bladder Irritants*  Alcoholic beverages  Apples and apple juice  Cantaloupe  Carbonated beverages  Chili and spicy foods  Chocolate  Citrus fruit  Coffee (including decaffeinated)  Cranberries and cranberry juice  Grapes  Guava  Milk Products: milk, cheese, cottage cheese, yogurt, ice cream  Peaches  Pineapple  Plums  Strawberries  Sugar especially artificial sweeteners, saccharin, aspartame, corn sweeteners, honey, fructose, sucrose, lactose  Tea  Tomatoes and tomato juice  Vitamin B complex  Vinegar  *Most people are not sensitive to ALL of these products; your goal is to find the foods that make YOUR  symptoms worse

## 2022-01-31 NOTE — PROGRESS NOTES
[facility] - UROGYNECOLOGY  [address] [address]    Yanni Kaiser  [id number]  1970 January 31, 2022    Consulting Physician: Lawrence Marin MD     Primary M.D.: Carlyle Gordillo MD    Chief Complaint   Patient presents with    Consult    Urinary Incontinence       HPI:     1)  UI:  (+) KELLEY < (+) UUI  X 1years.  Changes clothes at least several times a week. Urinary frequency: Q 30 minutes.  Nocturia: Yes: 4-5/night.   (+) dysuria,  (--) hematuria,  (--) frequent UTIs.  (--) complete bladder emptying.     Drinking water, tea, lemonade    2)  POP:  Absent.Symptoms:(--)    (--) vaginal bleeding. (--) vaginal discharge. (+) sexually active.  (+) dyspareunia with initial penetration (+)  Vaginal dryness.  (--) vaginal estrogen use.     3)  BM:  (+) constipation/straining.  Taking movantik-- followed by Dr. Trejo/ Patel  (--) chronic diarrhea. (--) hematochezia.  (--) fecal incontinence.  (--) fecal smearing/urgency.  (--) complete evacuation.      4)DM  --reports FBS and random glucose     Past Medical History  Past Medical History:   Diagnosis Date    Allergy     Anemia     Anxiety     Asthma     Back pain     Chronic bronchitis     Cigarette smoker     DDD (degenerative disc disease), lumbar 6/9/2020    Depression     Diabetes with neurologic complications     DUB (dysfunctional uterine bleeding) 10/16/2018    GERD (gastroesophageal reflux disease)     High cholesterol     Hyperprolactinemia     Hypertension     Influenza A 01/16/2020    Neuromuscular disorder     Obese body habitus     Obesity     Pseudotumor cerebri     Renal manifestation of secondary diabetes mellitus     Respiratory failure     Seizures     Simple endometrial hyperplasia 2018    Sleep apnea     Smoker     Tobacco dependence     Urinary incontinence         Past Surgical History  Past Surgical History:   Procedure Laterality Date    ANTROSTOMY OF MAXILLARY SINUS AT  INFERIOR NASAL MEATUS  10/22/2021    Procedure: MAXILLARY ANTROSTOMY, AT INFERIOR NASAL MEATUS;  Surgeon: John Prado III, MD;  Location: [facility] OR;  Service: ENT;;    BREAST CYST ASPIRATION       SECTION      X 1    CHOLECYSTECTOMY      COLONOSCOPY      COLONOSCOPY N/A 2019    Procedure: COLONOSCOPY;  Surgeon: Jagruti Sweeney MD;  Location: [facility] (2ND FLR);  Service: Endoscopy;  Laterality: N/A;  BMI is 63/gastroparesis/3 days full liquid diet 1 day clear liquid see telephone encounter by Ciara Brown     EPIDURAL STEROID INJECTION N/A 2020    Procedure: INJECTION, STEROID, EPIDURAL, L5-S1 IL;  Surgeon: Lawrence Marin MD;  Location: [facility] PAIN MGT;  Service: Pain Management;  Laterality: N/A;    ESOPHAGEAL MANOMETRY WITH MEASUREMENT OF IMPEDANCE N/A 2019    Procedure: MANOMETRY-ESOPHAGEAL-WITH IMPEDANCE;  Surgeon: Jagruti Sweeney MD;  Location: [facility] (2ND FLR);  Service: Endoscopy;  Laterality: N/A;  Hold Narcotics x 1 days   Hold TCA x 1 days  Propofol only. No fentanyl or benzodiazepine during sedation. If additional sedation needed, discuss with Dr. Sweeney.  10/29 - pt confirmed appt    ESOPHAGOGASTRODUODENOSCOPY N/A 2019    Procedure: EGD (ESOPHAGOGASTRODUODENOSCOPY);  Surgeon: Jagruti Sweeney MD;  Location: [facility] (2ND FLR);  Service: Endoscopy;  Laterality: N/A;  BMI is 63/gastroparesis/3 days full liquid diet 1 day clear liquid see telephone encounter by Ciara limon    ESOPHAGOGASTRODUODENOSCOPY N/A 2019    Procedure: ESOPHAGOGASTRODUODENOSCOPY (EGD);  Surgeon: Jagruti Sweeney MD;  Location: [facility] (2ND FLR);  Service: Endoscopy;  Laterality: N/A;  EGD with EndoFlip /+/- Botox  2nd floor for gastroparesis/BMI 63 **367lbs**  Bariatric Stretcher needed  Manometry probe to be placed endoscopically during EGD procedure  Due to change in protocol, Full liquid diet for 3 days/ 1 day of clear liquids  Hi    ESOPHAGOGASTRODUODENOSCOPY N/A  12/14/2020    Procedure: ESOPHAGOGASTRODUODENOSCOPY (EGD) with BRAVO;  Surgeon: Jagruti Sweeney MD;  Location: [facility] ENDO (2ND FLR);  Service: Endoscopy;  Laterality: N/A;  with Dilation  2nd floor due to BMI 56.20 (327 lbs) and gastroparesis  3 days full liquid diet and 1 day clears    ESOPHAGOGASTRODUODENOSCOPY N/A 4/15/2021    Procedure: ESOPHAGOGASTRODUODENOSCOPY (EGD);  Surgeon: Jagruti Sweeney MD;  Location: [facility] ENDO (2ND FLR);  Service: Endoscopy;  Laterality: N/A;  Endoflip  2nd floor-gastroparesis, BMI 57.18, bariatric stretcher  full liquid diet x3 days, clear liquid diet x1 day prior to procedure  propofol only  covid test 4/12 primary care, instructions emailed-[facility]    FOOT FRACTURE SURGERY Left     FUNCTIONAL ENDOSCOPIC SINUS SURGERY (FESS) USING COMPUTER-ASSISTED NAVIGATION Bilateral 10/22/2021    Procedure: FESS, USING COMPUTER-ASSISTED NAVIGATION;  Surgeon: John Prado III, MD;  Location: [facility];  Service: ENT;  Laterality: Bilateral;  FOLLOW DR PRADO ANESTHESIA PROTOCAL / pt will stay 23 hour post surgery for SHARATH observation    HYSTEROSCOPY WITH DILATION AND CURETTAGE OF UTERUS N/A 10/16/2018    KJB    INJECTION OF ANESTHETIC AGENT AROUND NERVE Bilateral 10/23/2020    Procedure: BLOCK, NERVE  bilateral L3,4,5 MBB;  Surgeon: Lawrence Marin MD;  Location: [facility] PAIN MGT;  Service: Pain Management;  Laterality: Bilateral;  bilateral L3,4,5 MBB   consent needed    PLANTAR FASCIOTOMY Left 11/18/2019    Procedure: FASCIOTOMY, PLANTAR;  Surgeon: Valentino Orourke DPM;  Location: [facility] 2ND FLR;  Service: Podiatry;  Laterality: Left;    RETINAL LASER PROCEDURE Left     SINUS SURGERY      SPHENOIDECTOMY Bilateral 10/22/2021    Procedure: SPHENOIDECTOMY;  Surgeon: John Prado III, MD;  Location: [facility];  Service: ENT;  Laterality: Bilateral;    TRANSFORAMINAL EPIDURAL INJECTION OF STEROID Bilateral 6/24/2020    Procedure: INJECTION, STEROID, EPIDURAL, TRANSFORAMINAL APPROACH;   Surgeon: Lawrence Marin MD;  Location: [facility] PAIN MGT;  Service: Pain Management;  Laterality: Bilateral;    TRANSFORAMINAL EPIDURAL INJECTION OF STEROID Bilateral 2022    Procedure: INJECTION, STEROID, EPIDURAL, TRANSFORAMINAL APPROACH  Bilateral TFESI L4/L5      NEEDS CONSENT;  Surgeon: Lawrence Marin MD;  Location: [facility] PAIN MGT;  Service: Pain Management;  Laterality: Bilateral;    UPPER GASTROINTESTINAL ENDOSCOPY          Hysterectomy: No      Past Ob History     x 3.  C/s x 1.    Largest infant weight: 8# 7 oz  yes FAVD. no episiotomy.      Gynecologic History  LMP: Patient's last menstrual period was 2019.  Age of menarche: 13  Age of menopause: 48  Menstrual history: metrorrhagia  Pap test: 2018 normal no ecc.  History of abnormal paps: No.  History of STIs:  No  Mammogram: Date of last: 2021.  Result: Normal  Colonoscopy: Date of last: 2019.  Result:  Hemorrhoids found on perianal exam.                         - One 3 mm polyp in the cecum, removed with a jumbo cold forceps. Resected and retrieved.                         - Medium-sized lipoma in the ascending colon. No specimens collected.                         - Diverticulosis in the recto-sigmoid colon and in the cecum.                         - Two 3 to 5 mm polyps in the sigmoid colon, removed with a jumbo cold forceps. Resected and retrieved.                         - Multiple small polyps in the rectum and at the recto-sigmoid colon. Biopsied.                         - The distal rectum and anal verge are normal on retroflexion view. .  Repeat due:  .    DEXA:  N/a    Family History  Family History   Problem Relation Age of Onset    Hypertension Mother     Diabetes Mother     Colon cancer Mother 50    Colon polyps Mother     Cataracts Mother     Heart disease Father     COPD Father     Heart attack Father     Hypertension Father     Ulcers Father     Cataracts Father     Glaucoma Father      Cancer Maternal Grandmother     Breast cancer Maternal Grandmother     Colon cancer Maternal Grandmother     Colon polyps Maternal Grandmother     No Known Problems Paternal Grandmother     No Known Problems Brother     No Known Problems Maternal Aunt     No Known Problems Maternal Uncle     No Known Problems Paternal Aunt     No Known Problems Paternal Uncle     No Known Problems Maternal Grandfather     No Known Problems Paternal Grandfather     No Known Problems Daughter     No Known Problems Son     No Known Problems Daughter     No Known Problems Daughter     Celiac disease Neg Hx     Cirrhosis Neg Hx     Crohn's disease Neg Hx     Cystic fibrosis Neg Hx     Esophageal cancer Neg Hx     Hemochromatosis Neg Hx     Inflammatory bowel disease Neg Hx     Irritable bowel syndrome Neg Hx     Liver cancer Neg Hx     Liver disease Neg Hx     Rectal cancer Neg Hx     Stomach cancer Neg Hx     Ulcerative colitis Neg Hx     Jonnie's disease Neg Hx     Tuberculosis Neg Hx     Lymphoma Neg Hx     Scleroderma Neg Hx     Rheum arthritis Neg Hx     Melanoma Neg Hx     Multiple sclerosis Neg Hx     Psoriasis Neg Hx     Lupus Neg Hx     Skin cancer Neg Hx     Amblyopia Neg Hx     Blindness Neg Hx     Macular degeneration Neg Hx     Retinal detachment Neg Hx     Strabismus Neg Hx     Stroke Neg Hx     Thyroid disease Neg Hx     Allergic rhinitis Neg Hx     Allergies Neg Hx     Angioedema Neg Hx     Asthma Neg Hx     Eczema Neg Hx     Immunodeficiency Neg Hx     Rhinitis Neg Hx     Urticaria Neg Hx     Atopy Neg Hx       Colon CA: Yes - mother, matGM  Breast CA: Yes - mat GM  GYN CA: No   CA: No    Social History  Social History     Tobacco Use   Smoking Status Current Every Day Smoker    Packs/day: 1.00    Years: 27.00    Pack years: 27.00    Types: Cigarettes    Start date: 1/1/1992   Smokeless Tobacco Never Used   Tobacco Comment    1/2 a pack if her days are good    .    Social History     Substance and Sexual Activity   Alcohol Use Not Currently    Alcohol/week: 0.0 standard drinks    Comment: socially   .    Social History     Substance and Sexual Activity   Drug Use No   .  The patient is .  Resides in [place] [address].      Allergies  Review of patient's allergies indicates:   Allergen Reactions    Gabapentin Other (See Comments)     Makes her twitch       Medications  Current Outpatient Medications on File Prior to Visit   Medication Sig Dispense Refill    albuterol (PROVENTIL) 2.5 mg /3 mL (0.083 %) nebulizer solution Take 3 mLs (2.5 mg total) by nebulization every 6 (six) hours as needed for Wheezing. Rescue 3 mL 11    albuterol (PROVENTIL/VENTOLIN HFA) 90 mcg/actuation inhaler Inhale 2 puffs into the lungs every 6 (six) hours as needed for Wheezing or Shortness of Breath. Rescue 54 g 6    alcohol swabs (BD ALCOHOL SWABS) PadM Apply 1 each topically 2 (two) times a day. 200 each 4    allerg xt,D.farinae-D.pteronys (ODACTRA) 12 SQ-HDM Subl Place 1 tablet under the tongue once daily. 30 tablet 11    ammonium lactate 12 % Crea Apply twice daily to affected parts both feet as needed. 140 g 11    azelastine (ASTELIN) 137 mcg (0.1 %) nasal spray 1 spray (137 mcg total) by Nasal route 2 (two) times daily. 30 mL 11    blood sugar diagnostic Strp 1 each by Misc.(Non-Drug; Combo Route) route 4 (four) times daily before meals and nightly. ACCU CHEK ELVIA PLUS METER 200 each 3    blood-glucose meter (ACCU-CHEK ELVIA PLUS METER) Misc TEST FOUR TIMES DAILY BEFORE MEALS AND EVERY NIGHT 90 each 1    budesonide-formoterol 160-4.5 mcg (SYMBICORT) 160-4.5 mcg/actuation HFAA Inhale 2 puffs into the lungs every 12 (twelve) hours. Controller 1 Inhaler 5    butalbital-acetaminophen-caffeine -40 mg (FIORICET, ESGIC) -40 mg per tablet Take 1 tablet by mouth every 4 (four) hours as needed for Pain. 12 tablet 0    cetirizine (ZYRTEC) 10 MG tablet Take 1 tablet  (10 mg total) by mouth daily as needed for Allergies (itching). 30 tablet 0    cyclobenzaprine (FLEXERIL) 10 MG tablet Take 10 mg by mouth 3 (three) times daily.      diclofenac sodium (VOLTAREN) 1 % Gel Apply 2 g topically 4 (four) times daily. 1 Tube 2    diclofenac sodium (VOLTAREN) 1 % Gel Apply 2 g topically 2 (two) times daily. 100 g 2    doxycycline (VIBRA-TABS) 100 MG tablet Take 1 tablet (100 mg total) by mouth 2 (two) times daily. 20 tablet 0    fluticasone propionate (FLONASE) 50 mcg/actuation nasal spray by Each Nostril route.      JARDIANCE 10 mg tablet TAKE 1 TABLET(10 MG) BY MOUTH EVERY DAY 30 tablet 1    lancets (ACCU-CHEK FASTCLIX LANCET DRUM) Misc 1 Device by Misc.(Non-Drug; Combo Route) route 2 (two) times a day. 200 each 4    lancets (ACCU-CHEK SOFTCLIX LANCETS) Misc 1 Device by Misc.(Non-Drug; Combo Route) route 4 (four) times daily. 200 each 3    levocetirizine (XYZAL) 5 MG tablet Take 1 tablet (5 mg total) by mouth every evening. 90 tablet 3    lidocaine (LIDODERM) 5 % Place 1 patch onto the skin once daily. Remove & Discard patch within 12 hours or as directed by MD 15 patch 0    LIDOcaine HCL 2% (XYLOCAINE) 2 % jelly Apply topically as needed. Apply topically once nightly to affected part of foot/feet. 30 mL 2    losartan (COZAAR) 100 MG tablet Take 1 tablet (100 mg total) by mouth once daily. 90 tablet 0    lubiprostone (AMITIZA) 24 MCG Cap       montelukast (SINGULAIR) 10 mg tablet TAKE 1 TABLET(10 MG) BY MOUTH EVERY EVENING 30 tablet 2    MOVANTIK 12.5 mg Tab Take 1 tablet by mouth once daily.      nicotine (NICODERM CQ) 21 mg/24 hr Place 1 patch onto the skin once daily. 28 patch 0    nicotine, polacrilex, (NICORETTE) 2 mg Gum Take 1 each (2 mg total) by mouth as needed (Take 1 piece as needed.  Maximum of 10 per day.). 220 each 0    ondansetron (ZOFRAN) 8 MG tablet Take 1 tablet (8 mg total) by mouth every 8 (eight) hours as needed for Nausea. 90 tablet 11     ondansetron (ZOFRAN-ODT) 4 MG TbDL Dissolve 1 tablet (4 mg total) by mouth every 8 (eight) hours as needed (nausea). 15 tablet 0    oxyCODONE-acetaminophen (PERCOCET) 7.5-325 mg per tablet Take 1 tablet by mouth 3 (three) times daily as needed for Pain. 90 tablet 0    OZEMPIC 1 mg/dose (4 mg/3 mL) Inject 1 mg into the skin every 7 days. 3 pen 0    pantoprazole (PROTONIX) 40 MG tablet TAKE 1 TABLET(40 MG) BY MOUTH TWICE DAILY 60 tablet 6    QUEtiapine (SEROQUEL) 25 MG Tab Take 1 tablet (25 mg total) by mouth once daily. 90 tablet 0    sodium chloride (SALINE NASAL) 0.65 % nasal spray 2 sprays by Nasal route as needed for Congestion. 104 mL 12    TROKENDI  mg Cp24       venlafaxine (EFFEXOR-XR) 75 MG 24 hr capsule Take 1 capsule (75 mg total) by mouth once daily. 90 capsule 1    EPINEPHrine (EPIPEN) 0.3 mg/0.3 mL AtIn Inject 0.3 mLs (0.3 mg total) into the muscle once. for 1 dose 2 each 1    pregabalin (LYRICA) 50 MG capsule Take 1 capsule (50 mg total) by mouth 3 (three) times daily. 90 capsule 3    triamcinolone acetonide 0.1% (KENALOG) 0.1 % ointment Apply topically 2 (two) times daily as needed (rash). (Patient not taking: Reported on 1/31/2022) 1 each 0    valACYclovir (VALTREX) 1000 MG tablet Take 1 tablet (1,000 mg total) by mouth 2 (two) times daily. for 7 days 14 tablet 0     Current Facility-Administered Medications on File Prior to Visit   Medication Dose Route Frequency Provider Last Rate Last Admin    acetaminophen tablet 650 mg  650 mg Oral Once PRN Carlyle Gordillo MD        albuterol inhaler 2 puff  2 puff Inhalation Q20 Min PRN Carlyle Gordillo MD        diphenhydrAMINE injection 25 mg  25 mg Intravenous Once PRN Carlyle Gordillo MD        EPINEPHrine (EPIPEN) 0.3 mg/0.3 mL pen injection 0.3 mg  0.3 mg Intramuscular PRN Carlyle Gordillo MD        methylPREDNISolone sodium succinate injection 40 mg  40 mg Intravenous Once PRN Carlyle Gordillo MD        ondansetron disintegrating  "tablet 4 mg  4 mg Oral Once PRN Carlyle Gordillo MD        sodium chloride 0.9% 500 mL flush bag   Intravenous PRN Carlyle Gordillo MD        sodium chloride 0.9% flush 10 mL  10 mL Intravenous PRN Carlyle Gordillo MD           Review of Systems A 14 point ROS was reviewed with pertinent positives as noted above in the history of present illness.      Constitutional: negative  Eyes: negative  Endocrine: negative  Gastrointestinal: negative  Cardiovascular: negative  Respiratory: negative  Allergic/Immunologic: negative  Integumentary: negative  Psychiatric: negative  Musculoskeletal: negative   Ear/Nose/Throat: negative  Neurologic: negative  Genitourinary: SEE HPI  Hematologic/Lymphatic: negative   Breast: negative    Urogynecologic Exam  /80 (BP Location: Right arm, Patient Position: Sitting, BP Method: Large (Manual))   Ht 5' 4" (1.626 m)   Wt (!) 166.3 kg (366 lb 10 oz)   LMP 08/17/2019   BMI 62.93 kg/m²     GENERAL APPEARANCE:  The patient is well-developed, well-nourished.   Neck:  Supple with no thyromegaly, no carotid bruits.  Heart:  Regular rate and rhythm, no murmurs, rubs or gallops.  Lungs:  Clear.  No CVA tenderness.  Abdomen:  Soft, nontender, nondistended, no hepatosplenomegaly.      PELVIC:    External genitalia:  Normal Bartholins, Skenes and labia bilaterally.    Urethra:  No caruncle, diverticulum or masses.  (--) hypermobility.    Vagina:  Atrophy (+) , no bladder masses or tender, thick yellow clumpy discharge.    Cervix:  normal appearance  Uterus: difficulty to assess due to body habitus  Adnexa: Not palpable--difficulty to assess due to body habitus    POP-Q:  Aa -3; Ba -3; C -6; Ap 0; Bp 0; D -7.  Genital hiatus 3, perineal body 2 total vaginal length 10.      NEUROLOGIC:  Cranial nerves 2 through 12 intact.  Strength 5/5.  DTRs 2+ lower extremities.  S2 through 4 normal.  Sacral reflexes intact.    EXT: FERNÁNDEZ, 2+ pulses bilaterally, no C/C/E    COUGH STRESS TEST:  " negative  KEGEL: 1 /5    RECTAL:    External:  Normal, (--) hemorrhoids, (--) dovetailing.   Internal:  deferred    PVR: 20 mL    Impression    1. Mixed incontinence urge and stress    2. Vaginal atrophy    3. Vaginal discharge    4. Encounter for screening for cervical cancer    5. Pelvic floor weakness    6. Constipation, unspecified constipation type    7. Morbid obesity with BMI of 60.0-69.9, adult        Initial Plan  The patient was counseled regarding these issues. The patient was given a summary sheet containing each of these issues with possible options for evaluation and management. When appropriate, we also reviewed computer-generated diagrams specific to their diagnoses..  All questions were addressed to the patient's satisfaction.     1.  Mixed urinary incontinence, urge > stress:    --Empty bladder every 3 hours.  Empty well: wait a minute, lean forward on toilet.    --Avoid dietary irritants (see sheet).  Keep diary x 3-5 days to determine your irritants.  --start pelvic floor PT with  LEYDI Davis/Ester Bull: (p) [phone]  . (f) [phone].    --URGE: consider anticholinergic. Takes 2-4 weeks to see if will have effect.  For dry mouth: get sour, sugar free lozenge or gum.    --STRESS:  Pessary vs. Sling.   --urine culture today  --important to keep blood glucose in normal range    2. Vaginal atrophy (dryness):  Use 1 gram of estrogen cream in vagina nightly x 2 weeks, then twice a week thereafter.     3. Constipation:  --Start daily fiber.  Take 1 tsp of fiber powder (psyllium or other sugar-free powder).  Mix in 8 oz of water.  Take x 3-5 days.  Then, increase fiber by 1 tsp every 3-5 days until stool is easy to pass.  Stop and continue at that dose.   Do not exceed 6 tsps/day.  May also use over the counter stool softener 1-2 x/day.  AVOID laxatives.  --continue follow up with GI-- may need to call to schedule    4. Obesity  --bariatrics scheduled  --medical fitness plan referral placed    5.  Vaginal discharge  --affirm today  --diflucan 150 mg every other day x 3 doses    6. Well woman  --pap today  --takes 2-3 weeks for results    7. RTC 4 months for follow up    I spent a total of 30 minutes on the day of the visit.  This includes face to face time and non-face to face time preparing to see the patient (eg, review of tests), obtaining and/or reviewing separately obtained history, documenting clinical information in the electronic or other health record, independently interpreting results and communicating results to the patient/family/caregiver, or care coordinator.      Thank you for requesting consultation of your patient.  I look forward to participating in their care.    Kristin Morales  Female Pelvic Medicine and Reconstructive Surgery  [facility] [place], [place]

## 2022-02-01 ENCOUNTER — PATIENT MESSAGE (OUTPATIENT)
Dept: UROGYNECOLOGY | Facility: CLINIC | Age: 52
End: 2022-02-01
Payer: MEDICARE

## 2022-02-01 LAB
BACTERIAL VAGINOSIS DNA: NEGATIVE
CANDIDA GLABRATA DNA: NEGATIVE
CANDIDA KRUSEI DNA: NEGATIVE
CANDIDA RRNA VAG QL PROBE: POSITIVE
T VAGINALIS RRNA GENITAL QL PROBE: NEGATIVE

## 2022-02-02 ENCOUNTER — PATIENT MESSAGE (OUTPATIENT)
Dept: UROGYNECOLOGY | Facility: CLINIC | Age: 52
End: 2022-02-02
Payer: MEDICARE

## 2022-02-02 LAB — BACTERIA UR CULT: NO GROWTH

## 2022-02-04 LAB
HPV HR 12 DNA SPEC QL NAA+PROBE: NEGATIVE
HPV16 AG SPEC QL: NEGATIVE
HPV18 DNA SPEC QL NAA+PROBE: NEGATIVE

## 2022-02-05 LAB
FINAL PATHOLOGIC DIAGNOSIS: NORMAL
Lab: NORMAL

## 2022-02-07 ENCOUNTER — CLINICAL SUPPORT (OUTPATIENT)
Dept: REHABILITATION | Facility: HOSPITAL | Age: 52
End: 2022-02-07
Attending: NEUROLOGICAL SURGERY
Payer: MEDICARE

## 2022-02-07 ENCOUNTER — PATIENT MESSAGE (OUTPATIENT)
Dept: UROGYNECOLOGY | Facility: CLINIC | Age: 52
End: 2022-02-07
Payer: MEDICARE

## 2022-02-07 DIAGNOSIS — M54.42 CHRONIC LEFT-SIDED LOW BACK PAIN WITH LEFT-SIDED SCIATICA: ICD-10-CM

## 2022-02-07 DIAGNOSIS — G89.29 CHRONIC LEFT-SIDED LOW BACK PAIN WITH LEFT-SIDED SCIATICA: ICD-10-CM

## 2022-02-07 PROCEDURE — 97113 AQUATIC THERAPY/EXERCISES: CPT | Mod: HCNC

## 2022-02-07 NOTE — PROGRESS NOTES
[facility]   Physical Therapy Treatment Note     Name: Yanni Kaiser  Clinic Number: [id number]    Therapy Diagnosis:   Encounter Diagnosis   Name Primary?    Chronic left-sided low back pain with left-sided sciatica      Physician: Will Prabhakar MD    Visit Date: 2/7/2022      Physician: Will Prabhakar MD     Physician Orders: PT Eval and Treat   Medical Diagnosis from Referral:   E66.01,Z68.44 (ICD-10-CM) - Morbid obesity with BMI of 60.0-69.9, adult   M54.6 (ICD-10-CM) - Acute bilateral thoracic back pain   M54.17 (ICD-10-CM) - Lumbosacral radiculopathy at L4      Evaluation Date: 1/4/2022  Authorization Period Expiration: 03/31/2022  Plan of Care Expiration: 3/31/2022  Visit # / Visits authorized: 7/20 + Eval  PTA Visit #: 0/6     Time In: 10:30 am   Time Out: 11:40 am   Total Billable Time: 70 minutes    SUBJECTIVE     Pt reports: she is feeling better today than last week. She stated she had bad sinus issues last week so she took the week to rest and recover.     She was compliant with home exercise program.    Response to previous treatment: fatigue  Functional change: none at this time    Pain: 9/10  Location: bilateral back     OBJECTIVE     Objective Measures updated at progress report unless specified.     Treatment     Yanni received aquatic therapeutic exercises to develop strength, endurance, ROM, flexibility, posture, and core stabilization for 70 minutes including:    FUNCTIONAL MOBILITY TRAINING x 2 laps each at beginning and 1 lap each at end of session  Walk forward/backward/lateral    STRETCHES 2 x 30sec  Calf  hamstring    LE EX x 20       2.5# ankle weights   Squat  Heel raise with gluteal set  Hip abduction/adduction with B yellow db  Hip flex/ext with B yellow db  LAQ  Leg press aqua noodle/blue tubing  Tripod clams    // Bars balance  SL balance ---with orange yuk ball dribble 1' each side  Tandem walk in // bars 2 laps--NP  Wobble board 2'  without use of hands for support   Step ups Red STEP x 20 ea  Theraband sidestepping with green theraband x 2 laps   Theraband monster walks with green theraband x 2 laps    UE EX/CORE  x20  Mini squat with push/pull red kickboard with back at wall  B shoulder row with green theratubing   B shoulder extension with green theratubing    ENDURANCE  Bicycle in // bars 5'  LTR x 2' in // bars  DKTC in // bars 2'    Patient Education and Home Exercises     Home Exercises Provided and Patient Education Provided     Education provided:   Role of aquatic therapy  Hydration post therapy    Written Home Exercises Provided: Patient instructed to cont prior HEP. Exercises were reviewed and Yanni was able to demonstrate them prior to the end of the session.  Yanni demonstrated good  understanding of the education provided. See EMR under Patient Instructions for exercises provided during therapy sessions    ASSESSMENT   The patient was able to perform all advanced exercises well today she had greater difficulty with SL balance on the L compared to the R.      Yanni Is progressing well towards her goals.   Pt prognosis is Good.     Pt will continue to benefit from skilled outpatient physical therapy to address the deficits listed in the problem list box on initial evaluation, provide pt/family education and to maximize pt's level of independence in the home and community environment.     Pt's spiritual, cultural and educational needs considered and pt agreeable to plan of care and goals.     Anticipated barriers to physical therapy: none at this time    Goals:     1.Report decreased low back pain  < / =  3/10  to increase tolerance for exercise  2. Increase ROM by 15 degrees where limited in order to perform ADLs without difficulty.  3. Increase strength by 1/3 MMT grade in gluteal strength  to increase tolerance for ADL and work activities.  4. Pt to tolerate HEP to improve ROM and independence with ADL's     Long Term  Goals: 12 to 24 weeks  1.Report decreased low back  pain < / = 2/10  to increase tolerance for ADLs  2.Patient goal: Reduce pain  3.Increase strength to 4+/5 in  Gluteal muscles  to increase tolerance for ADL and work activities.    PLAN     Progress POC as tolerated by the patient.    Arti Rodriguez, PT

## 2022-02-08 ENCOUNTER — CLINICAL SUPPORT (OUTPATIENT)
Dept: REHABILITATION | Facility: HOSPITAL | Age: 52
End: 2022-02-08
Payer: MEDICARE

## 2022-02-08 DIAGNOSIS — N39.46 MIXED INCONTINENCE URGE AND STRESS: ICD-10-CM

## 2022-02-08 DIAGNOSIS — N81.89 PELVIC FLOOR WEAKNESS: ICD-10-CM

## 2022-02-08 DIAGNOSIS — K59.00 CONSTIPATION, UNSPECIFIED CONSTIPATION TYPE: ICD-10-CM

## 2022-02-08 DIAGNOSIS — M62.81 WEAKNESS OF TRUNK MUSCULATURE: Primary | ICD-10-CM

## 2022-02-08 DIAGNOSIS — M62.89 PELVIC FLOOR DYSFUNCTION: ICD-10-CM

## 2022-02-08 PROCEDURE — 97162 PT EVAL MOD COMPLEX 30 MIN: CPT | Mod: HCNC,PN

## 2022-02-08 PROCEDURE — 97110 THERAPEUTIC EXERCISES: CPT | Mod: HCNC,PN

## 2022-02-08 PROCEDURE — 97530 THERAPEUTIC ACTIVITIES: CPT | Mod: HCNC,PN

## 2022-02-09 ENCOUNTER — TELEPHONE (OUTPATIENT)
Dept: SMOKING CESSATION | Facility: CLINIC | Age: 52
End: 2022-02-09
Payer: MEDICARE

## 2022-02-09 NOTE — TELEPHONE ENCOUNTER
Smoking Cessation counselor attempted to contact patient regarding her smoking progress, but patient did not answer.  Counselor left a message requesting a return call.      Katharine Rodríguez RRT,MSW,LMSW,TTS  [phone]

## 2022-02-09 NOTE — PATIENT INSTRUCTIONS
Home Exercise Program: 02/08/2022      BLADDER HEALTH      WHAT IS CONSIDERED NORMAL?   The average bladder can hold about 2 cups (15-19oz, 450-550ml) of urine before it needs to be emptied.   The normal range of voiding urine is 6 to 8 times during a 24 hour period, or every 3-4 hours. As we get older, our bladder capacity can get smaller and we may need to pass urine more frequently but usually not more than every 2 hours.   Urine should flow easily without discomfort in a good, steady stream until the bladder is empty. No pushing or straining is necessary to empty the bladder.   An urge is a normal signal that you feel as the bladder stretches to fill with urine. Urges can be felt even if the bladder is not full. Urges are not commands to go to the toilet, merely a signal and can be controlled.    WHAT ARE GOOD BLADDER HABITS?   Take your time when emptying your bladder. Dont strain or push to empty your bladder. Make sure you empty your bladder completely each time you pass urine. Do not rush the process.    Dont wait too long - consistently ignoring the urge to go (waiting more than 4 hours between toileting) or urinating too infrequently may be convenient but not healthy for your bladder. You can actually overstretch your bladder beyond its normal size.    Dont go too often - avoid going to the toilet just in case (more often than every 2 hours). It is usually not necessary to go when you feel the first urge. Try to go only when your bladder is full. Dont let your bladder control your life.    DIET CAN AFFECT THE BLADDER   Maintain a healthy fluid intake. Many people decrease their intake of liquids in hopes that they will need to urinate less frequently or have less urinary leakage. While a decrease in liquid intake does result in a decrease in the volume of urine, the smaller amount of urine may be more highly concentrated. Highly concentrated, dark yellow urine is irritating to the bladder  surface and may actually cause you to go to the bathroom more frequently. It also encourages the growth of bacteria, which may lead to infections resulting in incontinence.  Depending on your body size and environment, drink 4-8 cups (8 oz each) of fluid per day, spread throughout the entire day, unless otherwise advised by your doctor.     Avoid of use the following acidic food/beverages in moderation:   Alcoholic beverages     Tomato-based products   Vinegar   Coffee (regular and decaf)   Tea (regular and decaf)   Canales   Citrus fruits and juices   Spicy foods   Caffeinated beverages   Cola   Milk   Food colorings and flavorings   Artificial sweeteners   Chocolate     Try using these dietary substitutions:   Water: grape juice, apple juice   Fruit: pears, apricots, papaya, watermelon   Coffee: KAVA®, Postum®, Cesar®, Kaffree Sydnie®   Tea: Non-citrus herbal, sun-brewed tea   Vitamin C: Calcium carbonate co-buffered with calcium ascorbate     Avoid constipation by maintaining a balanced diet of dietary fiber. Typical dietary recommendations for fiber are between 25-35 grams per day. You should discuss your fiber needs with your physician, pharmacist or nutritionist.     Stop smoking. Smoking is irritating to the bladder surface and is associated with bladder cancer. In addition, the coughing associated with smoking may lead to increased incontinent episodes.    Home Exercise Program: 02/08/2022    DIAPHRAGMATIC BREATHING     The diaphragm is a dome shaped muscle that forms the floor of the rib cage. It is the most efficient muscle for breathing and relaxation, although most people are not used to using the diaphragm. Diaphragmatic or belly breathing is an important technique to learn because it helps settle down or relax the autonomic nervous system. The correct use of diaphragmatic breathing can help to quiet brain activity resulting in the relaxation of all the muscles and organs of the body.  This is accomplished by slow rhythmic breathing concentrated in the diaphragm muscle rather than the chest.    360 Breath - Inhale long, slow and deep. You should feel as if your lower ribs are expanding in all directions like the way an umbrella opens. You should feel the belly, back and sides gently expand and you may notice a relaxation in the pelvic floor. If you notice that your belly is pulling up and flattening during your inhale - try to reverse this and allow your belly to gently expand during the inhale while it recoils and flattens during the exhale      Continue to breath like this for 5 minutes. Repeat 1 times/day.     Home Exercise Program: 02/08/2022    Bowel Movement Body Mechanics  1. Sit on the toilet comfortably with legs and buttocks relaxed.  2. Put your feet on a step stool or squatty potty (8 inches tall).  3. Lean forward while keeping your back straight and rest your elbows on your knees.  4. Keep your knees apart.  5. Exhale like you are blowing out birthday candles while you gently bear down.   6. After 4-5 pushes perform 2-3 quick kegels and then resume pushing with exhalation.     Do not strain and Do not hold your breath.

## 2022-02-11 NOTE — PLAN OF CARE
[facility]  Pelvic Health Physical Therapy Initial Evaluation    Date: 2022   Name: Yanni Kaiser  [facility] Number: [id number]  Therapy Diagnosis:   Encounter Diagnoses   Name Primary?    Mixed incontinence urge and stress     Pelvic floor weakness     Constipation, unspecified constipation type     Pelvic floor dysfunction     Weakness of trunk musculature Yes     Physician: Kristin Morales NP    Physician Orders: PT Eval and Treat - Pelvic Floor PT   Medical Diagnosis from Referral:   N39.46 (ICD-10-CM) - Mixed incontinence   N81.89 (ICD-10-CM) - Other female genital prolapse   K59.00 (ICD-10-CM) - Constipation, unspecified   Evaluation Date: 2022  Authorization Period Expiration: 2023  Plan of Care Expiration: 2022  Visit # / Visits authorized: 1/ 10    Time In: 1600  Time Out: 1701  Total Appointment Time (timed & untimed codes): 61 minutes  Total Billing Time: 61    Precautions: universal    Subjective     Date of onset: 1 year ago     History of current condition - Efrainkayla reports: Mixed urinary incontinence that began 1 year ago; UUI most significant. Occurs daily. Nocturia 4-5/night as well as enuresis every night. Occasional dysuria. Feels like she is incompletely emptying because she has to sit for 1 minute or so after urination until she empties slightly more. PVR within normal limits at most recent measurement. Frequency of approx 30 minutes. Unable to delay urge. Has been diagnosed with Grade 2 rectocele. Reports dyspareunia with insertion and vaginal dryness. Not taking estrogen yet. Hx of constipation secondary to gastroparesis; currently taking medication for this. Can sometimes go 1 week without BM.       OB/GYN History:   ;  x 3.  C/s x 1.    Sexually active? Yes  Pain with vaginal exams, intercourse or tampon use? Yes    Bladder/Bowel History:    Frequency of urination:   Daytime: Every 30 min            Nighttime: 4-5x    Difficulty  initiating urine stream: No   Urine stream: strong   Complete emptying: No   Bladder leakage: Yes   Frequency of incidents: daily   Amount leaked (urine): varies    Urinary Urgency: Yes  Able to delay the urge for at least 00 minute(s).   Frequency of bowel movements: once every 3-4 days days   Difficulty initiating BM: Yes   Does Patient Feel Empty after BM? No   Fiber Supplements or Laxative Use?  No   Colon leakage: No   Form of protection: none   Number of pads required in 24 hours: 00    PAIN:  Location: lower abdomen   Current 4/10, worst 10/10, best 0/10   Description: Aching, cramping  Aggravating Factors/Activities that cause symptoms: before, during and after BM   Easing Factors: no pattern recognized      Medical History: Yanni  has a past medical history of Allergy, Anemia, Anxiety, Asthma, Back pain, Chronic bronchitis, Cigarette smoker, DDD (degenerative disc disease), lumbar (2020), Depression, Diabetes with neurologic complications, DUB (dysfunctional uterine bleeding) (10/16/2018), GERD (gastroesophageal reflux disease), High cholesterol, Hyperprolactinemia, Hypertension, Influenza A (2020), Neuromuscular disorder, Obese body habitus, Obesity, Pseudotumor cerebri, Renal manifestation of secondary diabetes mellitus, Respiratory failure, Seizures, Simple endometrial hyperplasia (), Sleep apnea, Smoker, Tobacco dependence, and Urinary incontinence.     Surgical History:  Yanni Kaiser  has a past surgical history that includes Cholecystectomy; Sinus surgery;  section; Breast cyst aspiration; Hysteroscopy with dilation and curettage of uterus (N/A, 10/16/2018); Colonoscopy; Upper gastrointestinal endoscopy; Esophagogastroduodenoscopy (N/A, 2019); Colonoscopy (N/A, 2019); Retinal laser procedure (Left); Esophagogastroduodenoscopy (N/A, 2019); Esophageal manometry with measurement of impedance (N/A, 2019); Plantar fasciotomy (Left,  11/18/2019); Foot fracture surgery (Left); Transforaminal epidural injection of steroid (Bilateral, 6/24/2020); Epidural steroid injection (N/A, 9/11/2020); Injection of anesthetic agent around nerve (Bilateral, 10/23/2020); Esophagogastroduodenoscopy (N/A, 12/14/2020); Esophagogastroduodenoscopy (N/A, 4/15/2021); Functional endoscopic sinus surgery (FESS) using computer-assisted navigation (Bilateral, 10/22/2021); Sphenoidectomy (Bilateral, 10/22/2021); Antrostomy of maxillary sinus at inferior nasal meatus (10/22/2021); and Transforaminal epidural injection of steroid (Bilateral, 1/28/2022).    Medications: Yanni has a current medication list which includes the following prescription(s): albuterol, albuterol, alcohol swabs, odactra, ammonium lactate, azelastine, blood sugar diagnostic, blood-glucose meter, budesonide-formoterol 160-4.5 mcg, butalbital-acetaminophen-caffeine -40 mg, cetirizine, cyclobenzaprine, diclofenac sodium, diclofenac sodium, doxycycline, epinephrine, estradiol, fluticasone propionate, jardiance, lancets, lancets, levocetirizine, lidocaine, lidocaine hcl 2%, losartan, lubiprostone, montelukast, movantik, nicotine, nicotine (polacrilex), ondansetron, ondansetron, oxycodone-acetaminophen, ozempic, pantoprazole, pregabalin, quetiapine, sodium chloride, triamcinolone acetonide 0.1%, trokendi xr, valacyclovir, and venlafaxine, and the following Facility-Administered Medications: acetaminophen, albuterol, diphenhydramine, epinephrine, methylprednisolone sodium succinate, ondansetron, sodium chloride 0.9% 500 mL flush bag, and sodium chloride 0.9%.    Allergies:   Review of patient's allergies indicates:   Allergen Reactions    Gabapentin Other (See Comments)     Makes her twitch        Imaging none    Prior Therapy/Previous treatment included: pharmacololgic  Social History:  lives with their family  Current exercise: 2x wk aquatic therapy   Occupation: n/a  Prior Level of Function: daily  activity limited by pain   Current Level of Function: daily activity, including exercise and intercourse, limited by pain     Types of fluid intake: 2 bottles of water, 2-3 bottles of lemonade or tea   Diet: 1-2 meals per day; avoids starch, fried foods, beef;   Fiber: douglas mix, frozen   Habitus:overweight  Abuse/Neglect: No     Pts goals: improve bladder and bowel pain     OBJECTIVE     See EMR under MEDIA for written consent provided 2/8/2022  Chaperone: declined    ORTHO SCREEN  Posture in sitting: slouched  and sacral sitting  Posture in standing: forward and rounded shoulders  and increased lordosis  Pelvic alignment: no sign of deviations noted in supine   SI Joint Palpation: NP  Adductor Palpation: NP     LUMBAR ROM - Mod limited     HIP STRENGTH: 4-/5        Special Tests for Load Transfer Assessment Between the Trunk and Lower Extremities:     Active Straight Leg Test:    o Right: breath holding, pelvic rotation or drop  and sense of heaviness or weakness    o Left: breath holding, pelvic rotation or drop  and sense of heaviness or weakness      ABDOMINAL WALL ASSESSMENT  Palpation: thickened   Abdominal strength: Rectus abdominus: 4-/5     Transverse abdominus: 4-/5  Scarring: transverse lower abdominal scarring   Pelvic Floor Muscle and Transverse Abdominus Synergy: Not fully assessed   Diastasis:  Absent      BREATHING MECHANICS ASSESSMENT   Thorax Assessment During Quiet Respiration: WNL excursion of ribcage and WNL excursion of abdominal wall  Thorax Assessment During Deep Respiration: WNL excursion of ribcage and WNL excursion of abdominal wall    VAGINAL PELVIC FLOOR EXAM - NP this session             TREATMENT     Treatment Time In: 1630  Treatment Time Out: 1701  Total Treatment time (time-based codes) separate from Evaluation: 31 minutes      Therapeutic Exercise to develop  flexibility for 10 minutes including:     Review of: LTR, STKC, cat/cow, child's pose, deep squat     Neuromuscular  Re-education to develop Coordination, Control and Down training for 8 minutes including: pelvic floor relaxation/bulging training and diaphragmatic breathing    Therapeutic Activity Patient participated in dynamic functional therapeutic activities to improve functional performance for 13 minutes. Including: Education as described below.     Review of bowel habits - posture, DB, bulging vs straining, stretches     Patient Education provided:   general anatomy/physiology of urinary/ bowel  system, benefits of treatment, risks of treatment, and alternative methods of treatment were discussed with the pt. Additionally, bladder irritants, anatomy/physiology of pelvic floor, posture/body mechanices, bladder retraining, diaphragmatic breathing, proper bearing down techniques, fluid intake/dietary modifications and behavior modifications were reviewed.     Home Exercises Provided:  Written Home Exercises Provided: yes.  Exercises were reviewed and Yanni was able to demonstrate them prior to the end of the session.    Yanni demonstrated good  understanding of the education provided.     See EMR under Patient Instructions for exercises provided 2/8/2022.    Assessment     Yanni is a 51 y.o. female referred to outpatient Physical Therapy with a medical diagnosis of mixed incontinence, constipation, and prolapse. Pt presents with postural abnormalities, limited trunk mobility and trunk instability. Abdominal assessment revealed weakness, though good mobility demonstrated. Complete pelvic assessment not performed this session, but PT to resume NV. Dysfunction indicated by prolapse and poor bowel/bladder control. Together, deficits have resulted in significant bladder dysfunction, including urinary incontinence, nocturia, enuresis, frequency, and occasional dysuria. She also presents with prolapse and significant constipation, both of which contribute to pelvic pain. Based on presentation, Yanni would benefit from  continued pelvic floor physical therapy for improved abdominopelvic stability and coordination, as well as education on potentially beneficial behavior modifications.      Pt prognosis is Good.   Pt will benefit from skilled outpatient Physical Therapy to address the deficits stated above and in the chart below, provide pt/family education, and to maximize pt's level of independence.     Plan of care discussed with patient: Yes  Pt's spiritual, cultural and educational needs considered and patient is agreeable to the plan of care and goals as stated below:       Anticipated Barriers for therapy: none    Medical Necessity is demonstrated by the following    History  Co-morbidities and personal factors that may impact the plan of care Co-morbidities:   advanced age, high BMI and HTN    Personal Factors:   lifestyle     moderate   Examination  Body Structures and Functions, activity limitations and participation restrictions that may impact the plan of care Body Regions/Systems/Functions:  altered posture, poor knowledge of body mechanics and posture, poor trunk stability, decreased pelvic muscle strength, poor quality of pelvic muscle contraction, increased frequency of urination, increased nocturia, poor coordination of pelvic floor muscles during ADL's leading to urinary or fecal leakage, poor fluid intake, poor diet, incomplete urination, dysfunctional voiding and dysfunctional defecation     Activity Limitations:  urgency , delaying urge to urinate, bearing down for BM, pain with BM , initiating a BM, full bladder emptying, intercourse/vaginal exam/tampon use without pain, sleep uninterrupted by excessive nocturia, incontinence with ADLs, Pain with ADLs and bathroom mapping    Participation Restrictions:  all ADLs/iADLs uninterrupted by urinary incontinence/urgency/frequency, all ADLs/iADLs uninterrupted by discomfort associated with chronic constipation, social activities with friends/family, relationship with  spouse/partner, ADL participation affected by pain, regularly having a comfortable BM, Sleep restrictions and exercise restrictions due to incontinence     Activity limitations:   Learning and applying knowledge  no deficits    General Tasks and Commands  no deficits    Communication  no deficits    Mobility  lifting and carrying objects  walking    Self care  no deficits    Domestic Life  shopping  doing house work (cleaning house, washing dishes, laundry)  assisting others    Interactions/Relationships  intimate relationships    Life Areas  no deficits    Community and Social Life  community life  recreation and leisure       moderate   Clinical Presentation evolving clinical presentation with changing clinical characteristics moderate   Decision Making/ Complexity Score: moderate       Goals:  Short Term Goals: 5 weeks   Pt to be edu pelvic muscle bracing and be able to consistently perform correctly and quickly to help decrease incontinence with cough/laugh/sneeze.  Pt to demonstrate being able to correctly and consistently perform a kegel which is needed  to increase pelvic floor muscle coordination and strength needed for continence.  Pt to be able to delay the urge to urinate at least 5 minutes with a strong urge to urinate in order to make it to the bathroom without leaking.  Pt to report a decrease in urinary frequency from every 30 minutes to no more than once every 1 hour(s) to improve ability to participate in social activities.  Pt to voice understanding of the role that diet plays on urinary urgency.    Pt to report a decrease in nocturia to no more than 3x/night for improved ability to achieve restorative sleep.    Pt to report minimal pain with palpation of abdominal wall due to improvement in soft tissue mobility from moderate to minimal restrictions.  Pt to report a decrease in pain to no more than 2/10 at it's worst with vaginal intercourse.    Pt to demonstrate proper positioning on commode with  breathing techniques to decrease strain with BM to enable pt to feel empty after BM.   Pt to demonstrate independence with performing bowel massage to help with gut motility.   Pt to be able to bulge pelvic floor which is needed for comfortable BM and complete evacuation.   Pt to report being able to have a BM without straining 80% of the time to demonstrate improving PF coordination.     Long Term Goals: 10 weeks   Pt to be discharged with home plan for carry over after discharge.    Pt to report an 80% reduction of urinary incontinence symptoms with ADL participation thereby demonstrating improved pelvic floor muscle control and strength.   Pt to be able to delay the urge to urinate at least 10 minutes with a strong urge to urinate in order to make it to the bathroom without leaking.  Pt to report a decrease in urinary frequency from every 1 hour to no more than once every 2 hour(s) to improve ability to participate in social activities.  Pt to report a decrease in nocturia to no more than 1x/night for improved restorative sleep.    Pt to increase abdominal wall strength by at least 1 muscle grade to improve lumbopelvic stability with ADLs that would normally increase pain.  Pt to report being able to have a spontaneous bowel movement at least once every 2-3 days to demonstrate improving gut motility and pelvic floor coordination.   Pt to report a decrease in pain with BM to no more than 3/10 at its worst needed for more comfortable BM.        Plan     Plan of care Certification: 2/8/2022 to 5/8/2022.    Outpatient Physical Therapy 1 times weekly for 10 weeks to include the following interventions: therapeutic exercises, therapeutic activity, neuromuscular re-education, manual therapy, modalities PRN, patient/family education and self care/home management    Plan for next visit: PFM assessment     CHRISTIAN SNYDER, PT  02/11/2022        I have seen the patient, reviewed the therapist's plan of care, and I agree  with the plan of care.      I certify the need for these services furnished under this plan of treatment and while under my care.     ___________________ ________ Physician/Referring Practitioner            ___________________________ Date of Signature

## 2022-02-14 ENCOUNTER — TELEPHONE (OUTPATIENT)
Dept: SMOKING CESSATION | Facility: CLINIC | Age: 52
End: 2022-02-14
Payer: MEDICARE

## 2022-02-14 ENCOUNTER — OFFICE VISIT (OUTPATIENT)
Dept: PAIN MEDICINE | Facility: CLINIC | Age: 52
End: 2022-02-14
Payer: MEDICARE

## 2022-02-14 DIAGNOSIS — M47.816 LUMBAR SPONDYLOSIS: Primary | ICD-10-CM

## 2022-02-14 DIAGNOSIS — E66.01 MORBID OBESITY: ICD-10-CM

## 2022-02-14 DIAGNOSIS — G89.4 CHRONIC PAIN DISORDER: ICD-10-CM

## 2022-02-14 DIAGNOSIS — F11.20 UNCOMPLICATED OPIOID DEPENDENCE: ICD-10-CM

## 2022-02-14 PROCEDURE — 99213 PR OFFICE/OUTPT VISIT, EST, LEVL III, 20-29 MIN: ICD-10-PCS | Mod: HCNC,95,, | Performed by: ANESTHESIOLOGY

## 2022-02-14 PROCEDURE — 1159F MED LIST DOCD IN RCRD: CPT | Mod: HCNC,CPTII,95, | Performed by: ANESTHESIOLOGY

## 2022-02-14 PROCEDURE — 1159F PR MEDICATION LIST DOCUMENTED IN MEDICAL RECORD: ICD-10-PCS | Mod: HCNC,CPTII,95, | Performed by: ANESTHESIOLOGY

## 2022-02-14 PROCEDURE — 99213 OFFICE O/P EST LOW 20 MIN: CPT | Mod: HCNC,95,, | Performed by: ANESTHESIOLOGY

## 2022-02-14 PROCEDURE — 1160F PR REVIEW ALL MEDS BY PRESCRIBER/CLIN PHARMACIST DOCUMENTED: ICD-10-PCS | Mod: HCNC,CPTII,95, | Performed by: ANESTHESIOLOGY

## 2022-02-14 PROCEDURE — 1160F RVW MEDS BY RX/DR IN RCRD: CPT | Mod: HCNC,CPTII,95, | Performed by: ANESTHESIOLOGY

## 2022-02-14 NOTE — TELEPHONE ENCOUNTER
Smoking Cessation counselor attempted to contact patient regarding her smoking progress, but patient did not answer.  Counselor left a message requesting a return call.      Katharine Rodríguez RRT,MSW,LMSW,TTS  [phone]

## 2022-02-14 NOTE — PROGRESS NOTES
Chronic Pain-Tele-Medicine-Established Note (Follow up visit)      The patient location is: low back  The chief complaint leading to consultation is: low back/ chronic pain  Visit type: Virtual visit with synchronous audio and video  Total time spent with patient: 15 min  Each patient to whom he or she provides medical services by telemedicine is:  (1) informed of the relationship between the physician and patient and the respective role of any other health care provider with respect to management of the patient; and (2) notified that he or she may decline to receive medical services by telemedicine and may withdraw from such care at any time.    Notes:     Interval Hx 2/14/22  Patient returns today in virtual follow up. She is s/p bilateral L4/5 TFESI 1/28/22 which she reports only gave her 2 days of relief and her pain returned. In the interim she has continued with aqua therapy and was seen in [facility]. They plan to continue PT once her aqua therapy is done. She reports continued low back pain, worse with prolonged standing and walking and better with rest. She reports she has more back pain than leg pain at this time. She also reports bilateral hip pain. She continues with Lyrica 50 mg BID Flexeril 190 gm TID and Percept 7.5/325 daily prn. Denies any new numbness tingling weakness b/b incontinence.     Interval History 1/18/2022:  Yanni Kaiser returns to clinic for follow up of low back pain. She reports resolution dermotomal right sided mid back pain with dermatomal distribution suspicious for shingles despite not having a rash s/p valtrex 1000mg BID for 7 days. In regard to low back pain, she had a L4-L5 TFESI scheduled that was cancelled due to recent abx for chronic sinusitis. She continues to have chronic left sided low back with with radiation down to the lateral thigh and into the anterior and posterior leg and into the plantar aspects of the feet.  She reports low back pain leg heaviness with  walking that is releved with leaning on an object. Sitting relieves leg heaviness but not low back pain. Treats sx with topical voltaren gel, lyrica 50mg TID, and percocet 7.5-325 TID (#90 dispensed 1/17/22). Pain meds help but pain persists. She does report urge type incontinence as well as overflow type leakage between voids including overnight. She reports occasional significant LE weakness. Denies saddle anesthesia or ataxia.     Interval History 12/20/2021:  Yanni Kaiser presents to the [facility] for a follow-up appointment for mid-back pain. Since the last visit, Yanni Kaiser states the pain has been persistant. She states that her biggest reason she is here today is for right-sided mid-back pain that started about 2 weeks ago as an itching in her right mid-back that wrapped around her right flank. It started as an itching, progressed to a burning after a couple of days, and is now more of an achy pain. She does not recall if she had a rash or not. She went to urgent care about a week ago and was prescribed Valtrex, but has not started taking it yet because she is nervous about taking a new medicine. She continues to have low back pain that radiates down the legs, mostly in the LLE.      Interval History 9/29/2020:  The patient is a virtual appointment today for follow-up of back and leg pain.  She is now status post L4/5 interlaminar epidural steroid injection.  She also reports limited benefit from this procedure.  Her pain is mainly located across the lower back with intermittent radiation into the legs.  Her back pain is currently her greatest complaint.  It bothers her the most when she stands for a long time in changes positions.  She is still in physical therapy but states that she missed her appointment today due to her nephew being ill.  Her pain today is 10/10.     Interval History 8/25/2020:  The patient is here today for follow-up of chronic lower back pain.  The pain radiates down  the posterolateral aspect of both legs to her shin.  She had limited benefit with bilateral L4/5 TF NAKITA in the past.  We previously discussed IL NAKITA which she would like to schedule.  Since her previous encounter, she has started physical therapy which she thinks is helpful.  She has increased her home activity regimen as previously discussed.  Her pain today is 9/10     Interval History 7/14/2020:  The patient presents for follow up of lower back and leg pain.  She is s/p bilateral L4/5 TF NAKITA on 6/24/20 with limited benefit of leg pain.  She denies any respiratory changes such as fever, cough, or shortness of breath.  She continues to report pain that starts across the lower back with radiation down the side of both legs.  She has associated numbness and tingling to both legs.  Her pain worsens when she walks and when she is active.  She says that she had to PT appointments did not have follow-up appointments made.  The previous note indicates that she is to continue with PT she is seeing [facility] for chronic pain as occasion.  She says that she has been taking Lyrica at night that is making her wake up in the middle of the night with dizziness.  She stops it as instructed by PCP.  She has an appointment with her neurologist tomorrow.  Her pain today is 10/10.     Previous Encounter:  Yanni Kaiser presents to the clinic for the evaluation of lower back pain. The pain started 3 years ago following no specific incident and symptoms have been worsening.The pain is located in the lumbar area and radiates to the left leg mostly but also into the right leg. Non-specific dermatomal distribution.  The pain is described as aching, sharp and shooting and is rated as 10/10. The pain is rated with a score of  10/10 on the BEST day and a score of 10/10 on the WORST day.  Symptoms interfere with daily activity and sleeping. The pain is exacerbated by Sitting, Walking, Lifting and Getting out of bed/chair.  The  pain is mitigated by heat, ice, medications and physical therapy. She reports spending 4 hours per day reclining. The patient reports 0 hours of uninterrupted sleep per night.     Physical Therapy/Home Exercise: yes      Pain Disability Index Review:  Last 3 PDI Scores 1/18/2022 12/20/2021 11/11/2020   Pain Disability Index (PDI) 47 58 52      Pain Medications:     - Opioids: Percocet (Oxycodone/Acetaminophen)  - Adjuvant Medications: Advil,Motrin ( Ibuprofen)   Lidoderm patch, without benefit  Voltaren gel, without benefit  Gabapentin, side effects  Lamotrigine      report:  Reviewed and consistent with medication use as prescribed.     Pain Procedures:   6/4/20 Bilateral L4/5 TF NAKITA- only a few days of pain relief      Imaging:   Narrative       EXAMINATION:  MRI LUMBAR SPINE WITHOUT CONTRAST    CLINICAL HISTORY:  spinal stenosis; Lumbago with sciatica, right side    TECHNIQUE:  Multiplanar, multisequence MR images were acquired from the thoracolumbar junction to the sacrum without contrast.    COMPARISON:  MRI lumbar spine 08/28/2018    FINDINGS:  Straightening of the normal lumbar lordosis.  Mild grade 1 retrolisthesis L5 on S1    Vertebral body heights are maintained.  Several T1 and T2 hyperintense lesions within the L2 and L3 vertebral body favored to represent osseous hemangiomas.  Otherwise normal marrow signal.  No fracture.    Multilevel partial disc desiccation throughout the lumbar spine.  Posterior annular fissuring at L2-L3.    Distal spinal cord is unremarkable.  Conus terminates at L1-L2.    Degenerative findings:    T12-L1: No disc herniation, spinal canal stenosis, or neural foraminal narrowing.    L1-L2: No disc herniation, spinal canal stenosis, or neural foraminal narrowing.    L2-L3: No disc herniation, spinal canal stenosis or neural foraminal narrowing.    L3-L4: Mild circumferential disc bulge without spinal canal stenosis or neural foraminal narrowing.    L4-L5: Circumferential disc  bulge, facet arthropathy, and ligamentum flavum hypertrophy resulting in moderate spinal canal stenosis and moderate bilateral neural foraminal narrowing.    L5-S1: Circumferential disc bulge and facet arthropathy without spinal canal stenosis or neural foraminal narrowing.    Paraspinal muscles and soft tissues are unremarkable.       Impression         Multilevel lumbar spondylosis most prominent at L4-5 resulting in moderate spinal canal stenosis and moderate bilateral neural foraminal narrowing.  Findings have slightly progressed in comparison to prior study dated 2018.    Electronically signed by resident: Fady Reese  Date: 2020  Time: 08:27         Past Medical History:   Diagnosis Date    Allergy     Anemia     Anxiety     Asthma     Back pain     Chronic bronchitis     Cigarette smoker     DDD (degenerative disc disease), lumbar 2020    Depression     Diabetes with neurologic complications     DUB (dysfunctional uterine bleeding) 10/16/2018    GERD (gastroesophageal reflux disease)     High cholesterol     Hyperprolactinemia     Hypertension     Influenza A 2020    Neuromuscular disorder     Obese body habitus     Obesity     Pseudotumor cerebri     Renal manifestation of secondary diabetes mellitus     Respiratory failure     Seizures     Simple endometrial hyperplasia     Sleep apnea     Smoker     Tobacco dependence     Urinary incontinence      Past Surgical History:   Procedure Laterality Date    ANTROSTOMY OF MAXILLARY SINUS AT INFERIOR NASAL MEATUS  10/22/2021    Procedure: MAXILLARY ANTROSTOMY, AT INFERIOR NASAL MEATUS;  Surgeon: John Prado III, MD;  Location: [facility];  Service: ENT;;    BREAST CYST ASPIRATION       SECTION      X 1    CHOLECYSTECTOMY      COLONOSCOPY      COLONOSCOPY N/A 2019    Procedure: COLONOSCOPY;  Surgeon: Jagruti Sweeney MD;  Location: [facility] ([address] [place] [address]);  Service: Endoscopy;  Laterality: N/A;   BMI is 63/gastroparesis/3 days full liquid diet 1 day clear liquid see telephone encounter by Ciarasidney Pinto [facility]    EPIDURAL STEROID INJECTION N/A 9/11/2020    Procedure: INJECTION, STEROID, EPIDURAL, L5-S1 IL;  Surgeon: Lawrence Marin MD;  Location: [facility] [place] [facility];  Service: Pain Management;  Laterality: N/A;    ESOPHAGEAL MANOMETRY WITH MEASUREMENT OF IMPEDANCE N/A 11/5/2019    Procedure: MANOMETRY-ESOPHAGEAL-WITH IMPEDANCE;  Surgeon: Jagruti Sweeney MD;  Location: [facility] (2ND FLR);  Service: Endoscopy;  Laterality: N/A;  Hold Narcotics x 1 days   Hold TCA x 1 days  Propofol only. No fentanyl or benzodiazepine during sedation. If additional sedation needed, discuss with Dr. Sweeney.  10/29 - pt confirmed appt    ESOPHAGOGASTRODUODENOSCOPY N/A 1/14/2019    Procedure: EGD (ESOPHAGOGASTRODUODENOSCOPY);  Surgeon: Jagruti Sweeney MD;  Location: [facility] (2ND FLR);  Service: Endoscopy;  Laterality: N/A;  BMI is 63/gastroparesis/3 days full liquid diet 1 day clear liquid see telephone encounter by Ciara Pinto [facility]    ESOPHAGOGASTRODUODENOSCOPY N/A 11/5/2019    Procedure: ESOPHAGOGASTRODUODENOSCOPY (EGD);  Surgeon: Jagruti Sweeney MD;  Location: [facility] (2ND FLR);  Service: Endoscopy;  Laterality: N/A;  EGD with EndoFlip /+/- Botox  2nd floor for gastroparesis/BMI 63 **367lbs**  Bariatric Stretcher needed  Manometry probe to be placed endoscopically during EGD procedure  Due to change in protocol, Full liquid diet for 3 days/ 1 day of clear liquids  Hi    ESOPHAGOGASTRODUODENOSCOPY N/A 12/14/2020    Procedure: ESOPHAGOGASTRODUODENOSCOPY (EGD) with BRAVO;  Surgeon: Jagruti Sweeney MD;  Location: [facility] (2ND FLR);  Service: Endoscopy;  Laterality: N/A;  with Dilation  2nd floor due to BMI 56.20 (327 lbs) and gastroparesis  3 days full liquid diet and 1 day clears    ESOPHAGOGASTRODUODENOSCOPY N/A 4/15/2021    Procedure: ESOPHAGOGASTRODUODENOSCOPY (EGD);  Surgeon: Jagruti Sweeney MD;  Location: [facility]  [facility]O (2ND FLR);  Service: Endoscopy;  Laterality: N/A;  Endoflip  2nd floor-gastroparesis, BMI 57.18, bariatric stretcher  full liquid diet x3 days, clear liquid diet x1 day prior to procedure  propofol only  covid test 4/12 primary care, instructions emailed-[facility]    FOOT FRACTURE SURGERY Left     FUNCTIONAL ENDOSCOPIC SINUS SURGERY (FESS) USING COMPUTER-ASSISTED NAVIGATION Bilateral 10/22/2021    Procedure: FESS, USING COMPUTER-ASSISTED NAVIGATION;  Surgeon: John Prado III, MD;  Location: [facility] OR;  Service: ENT;  Laterality: Bilateral;  FOLLOW DR PRADO ANESTHESIA PROTOCAL / pt will stay 23 hour post surgery for SHARATH observation    HYSTEROSCOPY WITH DILATION AND CURETTAGE OF UTERUS N/A 10/16/2018    [facility]    INJECTION OF ANESTHETIC AGENT AROUND NERVE Bilateral 10/23/2020    Procedure: BLOCK, NERVE  bilateral L3,4,5 MBB;  Surgeon: Lawrence Marin MD;  Location: [facility] PAIN [facility]GT;  Service: Pain Management;  Laterality: Bilateral;  bilateral L3,4,5 MBB   consent needed    PLANTAR FASCIOTOMY Left 11/18/2019    Procedure: FASCIOTOMY, PLANTAR;  Surgeon: Valentino Orourke DPM;  Location: [facility] OR 2ND FLR;  Service: Podiatry;  Laterality: Left;    RETINAL LASER PROCEDURE Left     SINUS SURGERY      SPHENOIDECTOMY Bilateral 10/22/2021    Procedure: SPHENOIDECTOMY;  Surgeon: John Prado III, MD;  Location: [facility];  Service: ENT;  Laterality: Bilateral;    TRANSFORAMINAL EPIDURAL INJECTION OF STEROID Bilateral 6/24/2020    Procedure: INJECTION, STEROID, EPIDURAL, TRANSFORAMINAL APPROACH;  Surgeon: Lawrence Marin MD;  Location: [facility] PAIN [facility];  Service: Pain Management;  Laterality: Bilateral;    TRANSFORAMINAL EPIDURAL INJECTION OF STEROID Bilateral 1/28/2022    Procedure: INJECTION, STEROID, EPIDURAL, TRANSFORAMINAL APPROACH  Bilateral TFESI L4/L5      NEEDS CONSENT;  Surgeon: Lawrence Marin MD;  Location: [facility] PAIN [facility]GT;  Service: Pain Management;  Laterality: Bilateral;    UPPER GASTROINTESTINAL ENDOSCOPY        Social History     Socioeconomic History    Marital status:    Tobacco Use    Smoking status: Current Every Day Smoker     Packs/day: 1.00     Years: 27.00     Pack years: 27.00     Types: Cigarettes     Start date: 1/1/1992    Smokeless tobacco: Never Used    Tobacco comment: 1/2 a pack if her days are good   Substance and Sexual Activity    Alcohol use: Not Currently     Alcohol/week: 0.0 standard drinks     Comment: socially    Drug use: No    Sexual activity: Yes     Partners: Male     Birth control/protection: None     Comment:      Social Determinants of Health     Financial Resource Strain: Medium Risk    Difficulty of Paying Living Expenses: Somewhat hard   Food Insecurity: Food Insecurity Present    Worried About Running Out of Food in the Last Year: Sometimes true    Ran Out of Food in the Last Year: Sometimes true   Transportation Needs: No Transportation Needs    Lack of Transportation (Medical): No    Lack of Transportation (Non-Medical): No   Physical Activity: Inactive    Days of Exercise per Week: 0 days    Minutes of Exercise per Session: 0 min   Stress: Stress Concern Present    Feeling of Stress : To some extent   Social Connections: Unknown    Frequency of Communication with Friends and Family: Never    Frequency of Social Gatherings with Friends and Family: Never    Active Member of Clubs or Organizations: Yes    Attends Club or Organization Meetings: More than 4 times per year    Marital Status:    Housing Stability: High Risk    Unable to Pay for Housing in the Last Year: Yes    Number of Places Lived in the Last Year: 2    Unstable Housing in the Last Year: Yes     Family History   Problem Relation Age of Onset    Hypertension Mother     Diabetes Mother     Colon cancer Mother 50    Colon polyps Mother     Cataracts Mother     Heart disease Father     COPD Father     Heart attack Father     Hypertension Father     Ulcers Father      Cataracts Father     Glaucoma Father     Cancer Maternal Grandmother     Breast cancer Maternal Grandmother     Colon cancer Maternal Grandmother     Colon polyps Maternal Grandmother     No Known Problems Paternal Grandmother     No Known Problems Brother     No Known Problems Maternal Aunt     No Known Problems Maternal Uncle     No Known Problems Paternal Aunt     No Known Problems Paternal Uncle     No Known Problems Maternal Grandfather     No Known Problems Paternal Grandfather     No Known Problems Daughter     No Known Problems Son     No Known Problems Daughter     No Known Problems Daughter     Celiac disease Neg Hx     Cirrhosis Neg Hx     Crohn's disease Neg Hx     Cystic fibrosis Neg Hx     Esophageal cancer Neg Hx     Hemochromatosis Neg Hx     Inflammatory bowel disease Neg Hx     Irritable bowel syndrome Neg Hx     Liver cancer Neg Hx     Liver disease Neg Hx     Rectal cancer Neg Hx     Stomach cancer Neg Hx     Ulcerative colitis Neg Hx     Jonnie's disease Neg Hx     Tuberculosis Neg Hx     Lymphoma Neg Hx     Scleroderma Neg Hx     Rheum arthritis Neg Hx     Melanoma Neg Hx     Multiple sclerosis Neg Hx     Psoriasis Neg Hx     Lupus Neg Hx     Skin cancer Neg Hx     Amblyopia Neg Hx     Blindness Neg Hx     Macular degeneration Neg Hx     Retinal detachment Neg Hx     Strabismus Neg Hx     Stroke Neg Hx     Thyroid disease Neg Hx     Allergic rhinitis Neg Hx     Allergies Neg Hx     Angioedema Neg Hx     Asthma Neg Hx     Eczema Neg Hx     Immunodeficiency Neg Hx     Rhinitis Neg Hx     Urticaria Neg Hx     Atopy Neg Hx        Review of patient's allergies indicates:   Allergen Reactions    Gabapentin Other (See Comments)     Makes her twitch       Current Outpatient Medications   Medication Sig    albuterol (PROVENTIL) 2.5 mg /3 mL (0.083 %) nebulizer solution Take 3 mLs (2.5 mg total) by nebulization every 6 (six) hours as needed for  Wheezing. Rescue    albuterol (PROVENTIL/VENTOLIN HFA) 90 mcg/actuation inhaler Inhale 2 puffs into the lungs every 6 (six) hours as needed for Wheezing or Shortness of Breath. Rescue    alcohol swabs (BD ALCOHOL SWABS) PadM Apply 1 each topically 2 (two) times a day.    allerg xt,D.farinae-D.pteronys (ODACTRA) 12 SQ-HDM Subl Place 1 tablet under the tongue once daily.    ammonium lactate 12 % Crea Apply twice daily to affected parts both feet as needed.    azelastine (ASTELIN) 137 mcg (0.1 %) nasal spray 1 spray (137 mcg total) by Nasal route 2 (two) times daily.    blood sugar diagnostic Strp 1 each by Misc.(Non-Drug; Combo Route) route 4 (four) times daily before meals and nightly. ACCU CHEK ELVIA PLUS METER    blood-glucose meter (ACCU-CHEK ELVIA PLUS METER) Misc TEST FOUR TIMES DAILY BEFORE MEALS AND EVERY NIGHT    budesonide-formoterol 160-4.5 mcg (SYMBICORT) 160-4.5 mcg/actuation HFAA Inhale 2 puffs into the lungs every 12 (twelve) hours. Controller    butalbital-acetaminophen-caffeine -40 mg (FIORICET, ESGIC) -40 mg per tablet Take 1 tablet by mouth every 4 (four) hours as needed for Pain.    cetirizine (ZYRTEC) 10 MG tablet Take 1 tablet (10 mg total) by mouth daily as needed for Allergies (itching).    cyclobenzaprine (FLEXERIL) 10 MG tablet Take 10 mg by mouth 3 (three) times daily.    diclofenac sodium (VOLTAREN) 1 % Gel Apply 2 g topically 4 (four) times daily.    diclofenac sodium (VOLTAREN) 1 % Gel Apply 2 g topically 2 (two) times daily.    doxycycline (VIBRA-TABS) 100 MG tablet Take 1 tablet (100 mg total) by mouth 2 (two) times daily.    EPINEPHrine (EPIPEN) 0.3 mg/0.3 mL AtIn Inject 0.3 mLs (0.3 mg total) into the muscle once. for 1 dose    estradioL (ESTRACE) 0.01 % (0.1 mg/gram) vaginal cream Use 1 gram of estrogen cream in vagina nightly x 2 weeks, then twice a week thereafter.    fluticasone propionate (FLONASE) 50 mcg/actuation nasal spray by Each Nostril route.     JARDIANCE 10 mg tablet TAKE 1 TABLET(10 MG) BY MOUTH EVERY DAY    lancets (ACCU-CHEK FASTCLIX LANCET DRUM) Misc 1 Device by Misc.(Non-Drug; Combo Route) route 2 (two) times a day.    lancets (ACCU-CHEK SOFTCLIX LANCETS) Misc 1 Device by Misc.(Non-Drug; Combo Route) route 4 (four) times daily.    levocetirizine (XYZAL) 5 MG tablet Take 1 tablet (5 mg total) by mouth every evening.    lidocaine (LIDODERM) 5 % Place 1 patch onto the skin once daily. Remove & Discard patch within 12 hours or as directed by MD    LIDOcaine HCL 2% (XYLOCAINE) 2 % jelly Apply topically as needed. Apply topically once nightly to affected part of foot/feet.    losartan (COZAAR) 100 MG tablet Take 1 tablet (100 mg total) by mouth once daily.    lubiprostone (AMITIZA) 24 MCG Cap     montelukast (SINGULAIR) 10 mg tablet TAKE 1 TABLET(10 MG) BY MOUTH EVERY EVENING    MOVANTIK 12.5 mg Tab Take 1 tablet by mouth once daily.    nicotine (NICODERM CQ) 21 mg/24 hr Place 1 patch onto the skin once daily.    nicotine, polacrilex, (NICORETTE) 2 mg Gum Take 1 each (2 mg total) by mouth as needed (Take 1 piece as needed.  Maximum of 10 per day.).    ondansetron (ZOFRAN) 8 MG tablet Take 1 tablet (8 mg total) by mouth every 8 (eight) hours as needed for Nausea.    ondansetron (ZOFRAN-ODT) 4 MG TbDL Dissolve 1 tablet (4 mg total) by mouth every 8 (eight) hours as needed (nausea).    oxyCODONE-acetaminophen (PERCOCET) 7.5-325 mg per tablet Take 1 tablet by mouth 3 (three) times daily as needed for Pain.    OZEMPIC 1 mg/dose (4 mg/3 mL) Inject 1 mg into the skin every 7 days.    pantoprazole (PROTONIX) 40 MG tablet TAKE 1 TABLET(40 MG) BY MOUTH TWICE DAILY    pregabalin (LYRICA) 50 MG capsule Take 1 capsule (50 mg total) by mouth 3 (three) times daily.    QUEtiapine (SEROQUEL) 25 MG Tab Take 1 tablet (25 mg total) by mouth once daily.    sodium chloride (SALINE NASAL) 0.65 % nasal spray 2 sprays by Nasal route as needed for Congestion.     triamcinolone acetonide 0.1% (KENALOG) 0.1 % ointment Apply topically 2 (two) times daily as needed (rash). (Patient not taking: Reported on 1/31/2022)    TROKENDI  mg Cp24     valACYclovir (VALTREX) 1000 MG tablet Take 1 tablet (1,000 mg total) by mouth 2 (two) times daily. for 7 days    venlafaxine (EFFEXOR-XR) 75 MG 24 hr capsule Take 1 capsule (75 mg total) by mouth once daily.     Current Facility-Administered Medications   Medication    acetaminophen tablet 650 mg    albuterol inhaler 2 puff    diphenhydrAMINE injection 25 mg    EPINEPHrine (EPIPEN) 0.3 mg/0.3 mL pen injection 0.3 mg    methylPREDNISolone sodium succinate injection 40 mg    ondansetron disintegrating tablet 4 mg    sodium chloride 0.9% 500 mL flush bag    sodium chloride 0.9% flush 10 mL       REVIEW OF SYSTEMS:    GENERAL:  No weight loss, malaise or fevers.  HEENT:   No recent changes in vision or hearing. + Migraine  NECK:  Negative for lumps, no difficulty with swallowing.  RESPIRATORY:  Negative for cough, wheezing or shortness of breath, patient denies any recent URI. + SHARATH  CARDIOVASCULAR:  Negative for chest pain, leg swelling or palpitations. +HTN  GI:  Negative for abdominal discomfort, blood in stools or black stools or change in bowel habits.  MUSCULOSKELETAL:  See HPI.  SKIN:  Negative for lesions, rash, and itching.  PSYCH:  No mood disorder or recent psychosocial stressors.  Patients sleep is not disturbed secondary to pain.  HEMATOLOGY/LYMPHOLOGY:  Negative for prolonged bleeding, bruising easily or swollen nodes.  Patient is not currently taking any anti-coagulants  NEURO:   No history of headaches, syncope, paralysis, seizures or tremors.  All other reviewed and negative other than HPI.    OBJECTIVE:    General appearance: Well appearing, in no acute distress, alert and oriented x3.  Psych:  Mood and affect appropriate.      ASSESSMENT: 51 y.o. year old female with chronic low back pain pain, consistent  with pain of multi factorial etiology. Has MRI documented spinal stenosis, NF stenosis and lumbar facet arthropathy.    1. Lumbar spondylosis  Procedure Order to Pain Management   2. Uncomplicated opioid dependence     3. Chronic pain disorder     4. Morbid obesity           PLAN:     - Schedule for a Diagnostic Medial branch block at Left/ Right L3,4, and L5 x2 to help with axial low back pain and progress with a home exercise program.  - We will send her home with a pain diary and determine need for additional block vs new interventional planning.  - Counseled patient regarding the importance of activity modification and physical therapy and weight loss.  - Agree with bariatric clinic referral.  - Continue Aqua therapy.     The above plan and management options were discussed at length with patient. Patient is in agreement with the above and verbalized understanding. It will be communicated with the referring physician via electronic record, fax, or mail.    Jairo Graham  02/14/2022     I have personally reviewed the encounter with resident/fellow/NP and personally spoke with patient and addressed all questions and concerns. The encounter was initiated by patient who consented and verbalized understanding to the type of encounter not related to any office visit or other encounter in the past 7 days.    Lawrence Marin MD   2/14/2022    Lawrence Marin MD       No

## 2022-02-14 NOTE — H&P (VIEW-ONLY)
Chronic Pain-Tele-Medicine-Established Note (Follow up visit)      The patient location is: low back  The chief complaint leading to consultation is: low back/ chronic pain  Visit type: Virtual visit with synchronous audio and video  Total time spent with patient: 15 min  Each patient to whom he or she provides medical services by telemedicine is:  (1) informed of the relationship between the physician and patient and the respective role of any other health care provider with respect to management of the patient; and (2) notified that he or she may decline to receive medical services by telemedicine and may withdraw from such care at any time.    Notes:     Interval Hx 2/14/22  Patient returns today in virtual follow up. She is s/p bilateral L4/5 TFESI 1/28/22 which she reports only gave her 2 days of relief and her pain returned. In the interim she has continued with aqua therapy and was seen in [facility]. They plan to continue PT once her aqua therapy is done. She reports continued low back pain, worse with prolonged standing and walking and better with rest. She reports she has more back pain than leg pain at this time. She also reports bilateral hip pain. She continues with Lyrica 50 mg BID Flexeril 190 gm TID and Percept 7.5/325 daily prn. Denies any new numbness tingling weakness b/b incontinence.     Interval History 1/18/2022:  Yanni Kaiser returns to clinic for follow up of low back pain. She reports resolution dermotomal right sided mid back pain with dermatomal distribution suspicious for shingles despite not having a rash s/p valtrex 1000mg BID for 7 days. In regard to low back pain, she had a L4-L5 TFESI scheduled that was cancelled due to recent abx for chronic sinusitis. She continues to have chronic left sided low back with with radiation down to the lateral thigh and into the anterior and posterior leg and into the plantar aspects of the feet.  She reports low back pain leg heaviness with  walking that is releved with leaning on an object. Sitting relieves leg heaviness but not low back pain. Treats sx with topical voltaren gel, lyrica 50mg TID, and percocet 7.5-325 TID (#90 dispensed 1/17/22). Pain meds help but pain persists. She does report urge type incontinence as well as overflow type leakage between voids including overnight. She reports occasional significant LE weakness. Denies saddle anesthesia or ataxia.     Interval History 12/20/2021:  Yanni Kaiser presents to the [facility] for a follow-up appointment for mid-back pain. Since the last visit, Yanni Kaiser states the pain has been persistant. She states that her biggest reason she is here today is for right-sided mid-back pain that started about 2 weeks ago as an itching in her right mid-back that wrapped around her right flank. It started as an itching, progressed to a burning after a couple of days, and is now more of an achy pain. She does not recall if she had a rash or not. She went to urgent care about a week ago and was prescribed Valtrex, but has not started taking it yet because she is nervous about taking a new medicine. She continues to have low back pain that radiates down the legs, mostly in the LLE.      Interval History 9/29/2020:  The patient is a virtual appointment today for follow-up of back and leg pain.  She is now status post L4/5 interlaminar epidural steroid injection.  She also reports limited benefit from this procedure.  Her pain is mainly located across the lower back with intermittent radiation into the legs.  Her back pain is currently her greatest complaint.  It bothers her the most when she stands for a long time in changes positions.  She is still in physical therapy but states that she missed her appointment today due to her nephew being ill.  Her pain today is 10/10.     Interval History 8/25/2020:  The patient is here today for follow-up of chronic lower back pain.  The pain radiates down  the posterolateral aspect of both legs to her shin.  She had limited benefit with bilateral L4/5 TF NAKITA in the past.  We previously discussed IL NAKITA which she would like to schedule.  Since her previous encounter, she has started physical therapy which she thinks is helpful.  She has increased her home activity regimen as previously discussed.  Her pain today is 9/10     Interval History 7/14/2020:  The patient presents for follow up of lower back and leg pain.  She is s/p bilateral L4/5 TF NAKITA on 6/24/20 with limited benefit of leg pain.  She denies any respiratory changes such as fever, cough, or shortness of breath.  She continues to report pain that starts across the lower back with radiation down the side of both legs.  She has associated numbness and tingling to both legs.  Her pain worsens when she walks and when she is active.  She says that she had to PT appointments did not have follow-up appointments made.  The previous note indicates that she is to continue with PT she is seeing [facility] for chronic pain as occasion.  She says that she has been taking Lyrica at night that is making her wake up in the middle of the night with dizziness.  She stops it as instructed by PCP.  She has an appointment with her neurologist tomorrow.  Her pain today is 10/10.     Previous Encounter:  Yanni Kaiser presents to the clinic for the evaluation of lower back pain. The pain started 3 years ago following no specific incident and symptoms have been worsening.The pain is located in the lumbar area and radiates to the left leg mostly but also into the right leg. Non-specific dermatomal distribution.  The pain is described as aching, sharp and shooting and is rated as 10/10. The pain is rated with a score of  10/10 on the BEST day and a score of 10/10 on the WORST day.  Symptoms interfere with daily activity and sleeping. The pain is exacerbated by Sitting, Walking, Lifting and Getting out of bed/chair.  The  pain is mitigated by heat, ice, medications and physical therapy. She reports spending 4 hours per day reclining. The patient reports 0 hours of uninterrupted sleep per night.     Physical Therapy/Home Exercise: yes      Pain Disability Index Review:  Last 3 PDI Scores 1/18/2022 12/20/2021 11/11/2020   Pain Disability Index (PDI) 47 58 52      Pain Medications:     - Opioids: Percocet (Oxycodone/Acetaminophen)  - Adjuvant Medications: Advil,Motrin ( Ibuprofen)   Lidoderm patch, without benefit  Voltaren gel, without benefit  Gabapentin, side effects  Lamotrigine      report:  Reviewed and consistent with medication use as prescribed.     Pain Procedures:   6/4/20 Bilateral L4/5 TF NAKITA- only a few days of pain relief      Imaging:   Narrative       EXAMINATION:  MRI LUMBAR SPINE WITHOUT CONTRAST    CLINICAL HISTORY:  spinal stenosis; Lumbago with sciatica, right side    TECHNIQUE:  Multiplanar, multisequence MR images were acquired from the thoracolumbar junction to the sacrum without contrast.    COMPARISON:  MRI lumbar spine 08/28/2018    FINDINGS:  Straightening of the normal lumbar lordosis.  Mild grade 1 retrolisthesis L5 on S1    Vertebral body heights are maintained.  Several T1 and T2 hyperintense lesions within the L2 and L3 vertebral body favored to represent osseous hemangiomas.  Otherwise normal marrow signal.  No fracture.    Multilevel partial disc desiccation throughout the lumbar spine.  Posterior annular fissuring at L2-L3.    Distal spinal cord is unremarkable.  Conus terminates at L1-L2.    Degenerative findings:    T12-L1: No disc herniation, spinal canal stenosis, or neural foraminal narrowing.    L1-L2: No disc herniation, spinal canal stenosis, or neural foraminal narrowing.    L2-L3: No disc herniation, spinal canal stenosis or neural foraminal narrowing.    L3-L4: Mild circumferential disc bulge without spinal canal stenosis or neural foraminal narrowing.    L4-L5: Circumferential disc  bulge, facet arthropathy, and ligamentum flavum hypertrophy resulting in moderate spinal canal stenosis and moderate bilateral neural foraminal narrowing.    L5-S1: Circumferential disc bulge and facet arthropathy without spinal canal stenosis or neural foraminal narrowing.    Paraspinal muscles and soft tissues are unremarkable.       Impression         Multilevel lumbar spondylosis most prominent at L4-5 resulting in moderate spinal canal stenosis and moderate bilateral neural foraminal narrowing.  Findings have slightly progressed in comparison to prior study dated 2018.    Electronically signed by resident: Fady Reese  Date: 2020  Time: 08:27         Past Medical History:   Diagnosis Date    Allergy     Anemia     Anxiety     Asthma     Back pain     Chronic bronchitis     Cigarette smoker     DDD (degenerative disc disease), lumbar 2020    Depression     Diabetes with neurologic complications     DUB (dysfunctional uterine bleeding) 10/16/2018    GERD (gastroesophageal reflux disease)     High cholesterol     Hyperprolactinemia     Hypertension     Influenza A 2020    Neuromuscular disorder     Obese body habitus     Obesity     Pseudotumor cerebri     Renal manifestation of secondary diabetes mellitus     Respiratory failure     Seizures     Simple endometrial hyperplasia     Sleep apnea     Smoker     Tobacco dependence     Urinary incontinence      Past Surgical History:   Procedure Laterality Date    ANTROSTOMY OF MAXILLARY SINUS AT INFERIOR NASAL MEATUS  10/22/2021    Procedure: MAXILLARY ANTROSTOMY, AT INFERIOR NASAL MEATUS;  Surgeon: John Prado III, MD;  Location: [facility];  Service: ENT;;    BREAST CYST ASPIRATION       SECTION      X 1    CHOLECYSTECTOMY      COLONOSCOPY      COLONOSCOPY N/A 2019    Procedure: COLONOSCOPY;  Surgeon: Jagruti Sweeney MD;  Location: [facility] ([address] [place] [address]);  Service: Endoscopy;  Laterality: N/A;   BMI is 63/gastroparesis/3 days full liquid diet 1 day clear liquid see telephone encounter by Ciarasidney Pinto [facility]    EPIDURAL STEROID INJECTION N/A 9/11/2020    Procedure: INJECTION, STEROID, EPIDURAL, L5-S1 IL;  Surgeon: Lawrence Marin MD;  Location: [facility] [place], operated by [facility];  Service: Pain Management;  Laterality: N/A;    ESOPHAGEAL MANOMETRY WITH MEASUREMENT OF IMPEDANCE N/A 11/5/2019    Procedure: MANOMETRY-ESOPHAGEAL-WITH IMPEDANCE;  Surgeon: Jagruti Sweeney MD;  Location: [facility] (2ND FLR);  Service: Endoscopy;  Laterality: N/A;  Hold Narcotics x 1 days   Hold TCA x 1 days  Propofol only. No fentanyl or benzodiazepine during sedation. If additional sedation needed, discuss with Dr. Sweeney.  10/29 - pt confirmed appt    ESOPHAGOGASTRODUODENOSCOPY N/A 1/14/2019    Procedure: EGD (ESOPHAGOGASTRODUODENOSCOPY);  Surgeon: Jagruti Sweeney MD;  Location: [facility] (2ND FLR);  Service: Endoscopy;  Laterality: N/A;  BMI is 63/gastroparesis/3 days full liquid diet 1 day clear liquid see telephone encounter by Ciara Pinto [facility]    ESOPHAGOGASTRODUODENOSCOPY N/A 11/5/2019    Procedure: ESOPHAGOGASTRODUODENOSCOPY (EGD);  Surgeon: Jagruti Sweeney MD;  Location: [facility] (2ND FLR);  Service: Endoscopy;  Laterality: N/A;  EGD with EndoFlip /+/- Botox  2nd floor for gastroparesis/BMI 63 **367lbs**  Bariatric Stretcher needed  Manometry probe to be placed endoscopically during EGD procedure  Due to change in protocol, Full liquid diet for 3 days/ 1 day of clear liquids  Hi    ESOPHAGOGASTRODUODENOSCOPY N/A 12/14/2020    Procedure: ESOPHAGOGASTRODUODENOSCOPY (EGD) with BRAVO;  Surgeon: Jagruti Sweeney MD;  Location: [facility] (2ND FLR);  Service: Endoscopy;  Laterality: N/A;  with Dilation  2nd floor due to BMI 56.20 (327 lbs) and gastroparesis  3 days full liquid diet and 1 day clears    ESOPHAGOGASTRODUODENOSCOPY N/A 4/15/2021    Procedure: ESOPHAGOGASTRODUODENOSCOPY (EGD);  Surgeon: Jarguti Sweeney MD;  Location: [facility]  [facility] (2ND FLR);  Service: Endoscopy;  Laterality: N/A;  Endoflip  2nd floor-gastroparesis, BMI 57.18, bariatric stretcher  full liquid diet x3 days, clear liquid diet x1 day prior to procedure  propofol only  covid test 4/12 primary care, instructions [facility]    FOOT FRACTURE SURGERY Left     FUNCTIONAL ENDOSCOPIC SINUS SURGERY (FESS) USING COMPUTER-ASSISTED NAVIGATION Bilateral 10/22/2021    Procedure: FESS, USING COMPUTER-ASSISTED NAVIGATION;  Surgeon: John Prado III, MD;  Location: [facility] OR;  Service: ENT;  Laterality: Bilateral;  FOLLOW DR PRADO ANESTHESIA PROTOCAL / pt will stay 23 hour post surgery for SHARATH observation    HYSTEROSCOPY WITH DILATION AND CURETTAGE OF UTERUS N/A 10/16/2018    [facility]    INJECTION OF ANESTHETIC AGENT AROUND NERVE Bilateral 10/23/2020    Procedure: BLOCK, NERVE  bilateral L3,4,5 MBB;  Surgeon: Lawrence Marin MD;  Location: [facility];  Service: Pain Management;  Laterality: Bilateral;  bilateral L3,4,5 MBB   consent needed    PLANTAR FASCIOTOMY Left 11/18/2019    Procedure: FASCIOTOMY, PLANTAR;  Surgeon: Valentino Orourke DPM;  [facility];  Service: Podiatry;  Laterality: Left;    RETINAL LASER PROCEDURE Left     SINUS SURGERY      SPHENOIDECTOMY Bilateral 10/22/2021    Procedure: SPHENOIDECTOMY;  Surgeon: John Prado III, MD;  Location: [facility];  Service: ENT;  Laterality: Bilateral;    TRANSFORAMINAL EPIDURAL INJECTION OF STEROID Bilateral 6/24/2020    Procedure: INJECTION, STEROID, EPIDURAL, TRANSFORAMINAL APPROACH;  Surgeon: Lawrence Marin MD;  Location: [facility];  Service: Pain Management;  Laterality: Bilateral;    TRANSFORAMINAL EPIDURAL INJECTION OF STEROID Bilateral 1/28/2022    Procedure: INJECTION, STEROID, EPIDURAL, TRANSFORAMINAL APPROACH  Bilateral TFESI L4/L5      NEEDS CONSENT;  Surgeon: Lawrence Marin MD;  Location: [facility];  Service: Pain Management;  Laterality: Bilateral;    UPPER GASTROINTESTINAL ENDOSCOPY        Social History     Socioeconomic History    Marital status:    Tobacco Use    Smoking status: Current Every Day Smoker     Packs/day: 1.00     Years: 27.00     Pack years: 27.00     Types: Cigarettes     Start date: 1/1/1992    Smokeless tobacco: Never Used    Tobacco comment: 1/2 a pack if her days are good   Substance and Sexual Activity    Alcohol use: Not Currently     Alcohol/week: 0.0 standard drinks     Comment: socially    Drug use: No    Sexual activity: Yes     Partners: Male     Birth control/protection: None     Comment:      Social Determinants of Health     Financial Resource Strain: Medium Risk    Difficulty of Paying Living Expenses: Somewhat hard   Food Insecurity: Food Insecurity Present    Worried About Running Out of Food in the Last Year: Sometimes true    Ran Out of Food in the Last Year: Sometimes true   Transportation Needs: No Transportation Needs    Lack of Transportation (Medical): No    Lack of Transportation (Non-Medical): No   Physical Activity: Inactive    Days of Exercise per Week: 0 days    Minutes of Exercise per Session: 0 min   Stress: Stress Concern Present    Feeling of Stress : To some extent   Social Connections: Unknown    Frequency of Communication with Friends and Family: Never    Frequency of Social Gatherings with Friends and Family: Never    Active Member of Clubs or Organizations: Yes    Attends Club or Organization Meetings: More than 4 times per year    Marital Status:    Housing Stability: High Risk    Unable to Pay for Housing in the Last Year: Yes    Number of Places Lived in the Last Year: 2    Unstable Housing in the Last Year: Yes     Family History   Problem Relation Age of Onset    Hypertension Mother     Diabetes Mother     Colon cancer Mother 50    Colon polyps Mother     Cataracts Mother     Heart disease Father     COPD Father     Heart attack Father     Hypertension Father     Ulcers Father      Cataracts Father     Glaucoma Father     Cancer Maternal Grandmother     Breast cancer Maternal Grandmother     Colon cancer Maternal Grandmother     Colon polyps Maternal Grandmother     No Known Problems Paternal Grandmother     No Known Problems Brother     No Known Problems Maternal Aunt     No Known Problems Maternal Uncle     No Known Problems Paternal Aunt     No Known Problems Paternal Uncle     No Known Problems Maternal Grandfather     No Known Problems Paternal Grandfather     No Known Problems Daughter     No Known Problems Son     No Known Problems Daughter     No Known Problems Daughter     Celiac disease Neg Hx     Cirrhosis Neg Hx     Crohn's disease Neg Hx     Cystic fibrosis Neg Hx     Esophageal cancer Neg Hx     Hemochromatosis Neg Hx     Inflammatory bowel disease Neg Hx     Irritable bowel syndrome Neg Hx     Liver cancer Neg Hx     Liver disease Neg Hx     Rectal cancer Neg Hx     Stomach cancer Neg Hx     Ulcerative colitis Neg Hx     Jonnie's disease Neg Hx     Tuberculosis Neg Hx     Lymphoma Neg Hx     Scleroderma Neg Hx     Rheum arthritis Neg Hx     Melanoma Neg Hx     Multiple sclerosis Neg Hx     Psoriasis Neg Hx     Lupus Neg Hx     Skin cancer Neg Hx     Amblyopia Neg Hx     Blindness Neg Hx     Macular degeneration Neg Hx     Retinal detachment Neg Hx     Strabismus Neg Hx     Stroke Neg Hx     Thyroid disease Neg Hx     Allergic rhinitis Neg Hx     Allergies Neg Hx     Angioedema Neg Hx     Asthma Neg Hx     Eczema Neg Hx     Immunodeficiency Neg Hx     Rhinitis Neg Hx     Urticaria Neg Hx     Atopy Neg Hx        Review of patient's allergies indicates:   Allergen Reactions    Gabapentin Other (See Comments)     Makes her twitch       Current Outpatient Medications   Medication Sig    albuterol (PROVENTIL) 2.5 mg /3 mL (0.083 %) nebulizer solution Take 3 mLs (2.5 mg total) by nebulization every 6 (six) hours as needed for  Wheezing. Rescue    albuterol (PROVENTIL/VENTOLIN HFA) 90 mcg/actuation inhaler Inhale 2 puffs into the lungs every 6 (six) hours as needed for Wheezing or Shortness of Breath. Rescue    alcohol swabs (BD ALCOHOL SWABS) PadM Apply 1 each topically 2 (two) times a day.    allerg xt,D.farinae-D.pteronys (ODACTRA) 12 SQ-HDM Subl Place 1 tablet under the tongue once daily.    ammonium lactate 12 % Crea Apply twice daily to affected parts both feet as needed.    azelastine (ASTELIN) 137 mcg (0.1 %) nasal spray 1 spray (137 mcg total) by Nasal route 2 (two) times daily.    blood sugar diagnostic Strp 1 each by Misc.(Non-Drug; Combo Route) route 4 (four) times daily before meals and nightly. ACCU CHEK ELVIA PLUS METER    blood-glucose meter (ACCU-CHEK ELVIA PLUS METER) Misc TEST FOUR TIMES DAILY BEFORE MEALS AND EVERY NIGHT    budesonide-formoterol 160-4.5 mcg (SYMBICORT) 160-4.5 mcg/actuation HFAA Inhale 2 puffs into the lungs every 12 (twelve) hours. Controller    butalbital-acetaminophen-caffeine -40 mg (FIORICET, ESGIC) -40 mg per tablet Take 1 tablet by mouth every 4 (four) hours as needed for Pain.    cetirizine (ZYRTEC) 10 MG tablet Take 1 tablet (10 mg total) by mouth daily as needed for Allergies (itching).    cyclobenzaprine (FLEXERIL) 10 MG tablet Take 10 mg by mouth 3 (three) times daily.    diclofenac sodium (VOLTAREN) 1 % Gel Apply 2 g topically 4 (four) times daily.    diclofenac sodium (VOLTAREN) 1 % Gel Apply 2 g topically 2 (two) times daily.    doxycycline (VIBRA-TABS) 100 MG tablet Take 1 tablet (100 mg total) by mouth 2 (two) times daily.    EPINEPHrine (EPIPEN) 0.3 mg/0.3 mL AtIn Inject 0.3 mLs (0.3 mg total) into the muscle once. for 1 dose    estradioL (ESTRACE) 0.01 % (0.1 mg/gram) vaginal cream Use 1 gram of estrogen cream in vagina nightly x 2 weeks, then twice a week thereafter.    fluticasone propionate (FLONASE) 50 mcg/actuation nasal spray by Each Nostril route.     JARDIANCE 10 mg tablet TAKE 1 TABLET(10 MG) BY MOUTH EVERY DAY    lancets (ACCU-CHEK FASTCLIX LANCET DRUM) Misc 1 Device by Misc.(Non-Drug; Combo Route) route 2 (two) times a day.    lancets (ACCU-CHEK SOFTCLIX LANCETS) Misc 1 Device by Misc.(Non-Drug; Combo Route) route 4 (four) times daily.    levocetirizine (XYZAL) 5 MG tablet Take 1 tablet (5 mg total) by mouth every evening.    lidocaine (LIDODERM) 5 % Place 1 patch onto the skin once daily. Remove & Discard patch within 12 hours or as directed by MD    LIDOcaine HCL 2% (XYLOCAINE) 2 % jelly Apply topically as needed. Apply topically once nightly to affected part of foot/feet.    losartan (COZAAR) 100 MG tablet Take 1 tablet (100 mg total) by mouth once daily.    lubiprostone (AMITIZA) 24 MCG Cap     montelukast (SINGULAIR) 10 mg tablet TAKE 1 TABLET(10 MG) BY MOUTH EVERY EVENING    MOVANTIK 12.5 mg Tab Take 1 tablet by mouth once daily.    nicotine (NICODERM CQ) 21 mg/24 hr Place 1 patch onto the skin once daily.    nicotine, polacrilex, (NICORETTE) 2 mg Gum Take 1 each (2 mg total) by mouth as needed (Take 1 piece as needed.  Maximum of 10 per day.).    ondansetron (ZOFRAN) 8 MG tablet Take 1 tablet (8 mg total) by mouth every 8 (eight) hours as needed for Nausea.    ondansetron (ZOFRAN-ODT) 4 MG TbDL Dissolve 1 tablet (4 mg total) by mouth every 8 (eight) hours as needed (nausea).    oxyCODONE-acetaminophen (PERCOCET) 7.5-325 mg per tablet Take 1 tablet by mouth 3 (three) times daily as needed for Pain.    OZEMPIC 1 mg/dose (4 mg/3 mL) Inject 1 mg into the skin every 7 days.    pantoprazole (PROTONIX) 40 MG tablet TAKE 1 TABLET(40 MG) BY MOUTH TWICE DAILY    pregabalin (LYRICA) 50 MG capsule Take 1 capsule (50 mg total) by mouth 3 (three) times daily.    QUEtiapine (SEROQUEL) 25 MG Tab Take 1 tablet (25 mg total) by mouth once daily.    sodium chloride (SALINE NASAL) 0.65 % nasal spray 2 sprays by Nasal route as needed for Congestion.     triamcinolone acetonide 0.1% (KENALOG) 0.1 % ointment Apply topically 2 (two) times daily as needed (rash). (Patient not taking: Reported on 1/31/2022)    TROKENDI  mg Cp24     valACYclovir (VALTREX) 1000 MG tablet Take 1 tablet (1,000 mg total) by mouth 2 (two) times daily. for 7 days    venlafaxine (EFFEXOR-XR) 75 MG 24 hr capsule Take 1 capsule (75 mg total) by mouth once daily.     Current Facility-Administered Medications   Medication    acetaminophen tablet 650 mg    albuterol inhaler 2 puff    diphenhydrAMINE injection 25 mg    EPINEPHrine (EPIPEN) 0.3 mg/0.3 mL pen injection 0.3 mg    methylPREDNISolone sodium succinate injection 40 mg    ondansetron disintegrating tablet 4 mg    sodium chloride 0.9% 500 mL flush bag    sodium chloride 0.9% flush 10 mL       REVIEW OF SYSTEMS:    GENERAL:  No weight loss, malaise or fevers.  HEENT:   No recent changes in vision or hearing. + Migraine  NECK:  Negative for lumps, no difficulty with swallowing.  RESPIRATORY:  Negative for cough, wheezing or shortness of breath, patient denies any recent URI. + SHARATH  CARDIOVASCULAR:  Negative for chest pain, leg swelling or palpitations. +HTN  GI:  Negative for abdominal discomfort, blood in stools or black stools or change in bowel habits.  MUSCULOSKELETAL:  See HPI.  SKIN:  Negative for lesions, rash, and itching.  PSYCH:  No mood disorder or recent psychosocial stressors.  Patients sleep is not disturbed secondary to pain.  HEMATOLOGY/LYMPHOLOGY:  Negative for prolonged bleeding, bruising easily or swollen nodes.  Patient is not currently taking any anti-coagulants  NEURO:   No history of headaches, syncope, paralysis, seizures or tremors.  All other reviewed and negative other than HPI.    OBJECTIVE:    General appearance: Well appearing, in no acute distress, alert and oriented x3.  Psych:  Mood and affect appropriate.      ASSESSMENT: 51 y.o. year old female with chronic low back pain pain, consistent  with pain of multi factorial etiology. Has MRI documented spinal stenosis, NF stenosis and lumbar facet arthropathy.    1. Lumbar spondylosis  Procedure Order to Pain Management   2. Uncomplicated opioid dependence     3. Chronic pain disorder     4. Morbid obesity           PLAN:     - Schedule for a Diagnostic Medial branch block at Left/ Right L3,4, and L5 x2 to help with axial low back pain and progress with a home exercise program.  - We will send her home with a pain diary and determine need for additional block vs new interventional planning.  - Counseled patient regarding the importance of activity modification and physical therapy and weight loss.  - Agree with bariatric clinic referral.  - Continue Aqua therapy.     The above plan and management options were discussed at length with patient. Patient is in agreement with the above and verbalized understanding. It will be communicated with the referring physician via electronic record, fax, or mail.    Jairo Graham  02/14/2022     I have personally reviewed the encounter with resident/fellow/NP and personally spoke with patient and addressed all questions and concerns. The encounter was initiated by patient who consented and verbalized understanding to the type of encounter not related to any office visit or other encounter in the past 7 days.    Lawrence Marin MD   2/14/2022    Lawrence Marin MD

## 2022-02-15 ENCOUNTER — TELEPHONE (OUTPATIENT)
Dept: ADMINISTRATIVE | Facility: OTHER | Age: 52
End: 2022-02-15
Payer: MEDICARE

## 2022-02-15 ENCOUNTER — CLINICAL SUPPORT (OUTPATIENT)
Dept: REHABILITATION | Facility: HOSPITAL | Age: 52
End: 2022-02-15
Attending: NEUROLOGICAL SURGERY
Payer: MEDICARE

## 2022-02-15 DIAGNOSIS — M79.7 FIBROMYALGIA SYNDROME: ICD-10-CM

## 2022-02-15 DIAGNOSIS — M54.42 CHRONIC LEFT-SIDED LOW BACK PAIN WITH LEFT-SIDED SCIATICA: ICD-10-CM

## 2022-02-15 DIAGNOSIS — M54.41 CHRONIC MIDLINE LOW BACK PAIN WITH BILATERAL SCIATICA: ICD-10-CM

## 2022-02-15 DIAGNOSIS — G89.29 CHRONIC LEFT-SIDED LOW BACK PAIN WITH LEFT-SIDED SCIATICA: ICD-10-CM

## 2022-02-15 DIAGNOSIS — G89.29 CHRONIC MIDLINE LOW BACK PAIN WITH BILATERAL SCIATICA: ICD-10-CM

## 2022-02-15 DIAGNOSIS — M54.42 CHRONIC MIDLINE LOW BACK PAIN WITH BILATERAL SCIATICA: ICD-10-CM

## 2022-02-15 PROCEDURE — 97113 AQUATIC THERAPY/EXERCISES: CPT | Mod: HCNC,CQ

## 2022-02-15 RX ORDER — OXYCODONE AND ACETAMINOPHEN 7.5; 325 MG/1; MG/1
1 TABLET ORAL 3 TIMES DAILY PRN
Qty: 90 TABLET | Refills: 0 | Status: SHIPPED | OUTPATIENT
Start: 2022-02-17 | End: 2022-03-14 | Stop reason: SDUPTHER

## 2022-02-15 NOTE — PROGRESS NOTES
[facility]   Physical Therapy Treatment Note     Name: Yanni Kaiser  Clinic Number: [id number]    Therapy Diagnosis:   No diagnosis found.  Physician: Will Prabhakar MD    Visit Date: 2/15/2022      Physician: Will Prabhakar MD     Physician Orders: PT Eval and Treat   Medical Diagnosis from Referral:   E66.01,Z68.44 (ICD-10-CM) - Morbid obesity with BMI of 60.0-69.9, adult   M54.6 (ICD-10-CM) - Acute bilateral thoracic back pain   M54.17 (ICD-10-CM) - Lumbosacral radiculopathy at L4      Evaluation Date: 1/4/2022  Authorization Period Expiration: 03/31/2022  Plan of Care Expiration: 3/31/2022  Visit # / Visits authorized: 8/20 + Eval  PTA Visit #: 1/6     Time In: 1000   Time Out: 1055  Total Billable Time: 55 minutes    SUBJECTIVE     Pt reports: she is feeling much better than last time.  Improved pain level    She was compliant with home exercise program.    Response to previous treatment: fatigue  Functional change: none at this time    Pain: 5/10  Location: bilateral back     OBJECTIVE     Objective Measures updated at progress report unless specified.     Treatment     Yanni received aquatic therapeutic exercises to develop strength, endurance, ROM, flexibility, posture, and core stabilization for 55 minutes including:    FUNCTIONAL MOBILITY TRAINING x 2 laps each at beginning and 1 lap each at end of session  Walk forward/backward/lateral    STRETCHES 2 x 30sec  Calf  hamstring    LE EX x 20       2.5# ankle weights   Squat  Heel raise with gluteal set  Hip abduction/adduction with B yellow db  Hip flex/ext with B yellow db  LAQ  Leg press aqua noodle/blue tubing  Tripod clams    // Bars balance  SL balance ---with orange yuk ball dribble 1' each side  Tandem walk in // bars 2 laps  Wobble board 2' without use of hands for support   Step ups Red STEP x 20 ea  Theraband sidestepping with green theraband x 2 laps   Theraband monster walks with green theraband x 2  laps    UE EX/CORE  x20  Mini squat with push/pull red kickboard with back at wall  B shoulder row with green theratubing   B shoulder extension with green theratubing    ENDURANCE  Bicycle in // bars 5'  LTR x 2' in // bars  DKTC in // bars 2'    Patient Education and Home Exercises     Home Exercises Provided and Patient Education Provided     Education provided:   Role of aquatic therapy  Hydration post therapy    Written Home Exercises Provided: Patient instructed to cont prior HEP. Exercises were reviewed and Yanni was able to demonstrate them prior to the end of the session.  Yanni demonstrated good  understanding of the education provided. See EMR under Patient Instructions for exercises provided during therapy sessions    ASSESSMENT   The patient was able to perform all advanced exercises well today.  Mild increase in pain level upon completion that she rates at 7/10      Yanni Is progressing well towards her goals.   Pt prognosis is Good.     Pt will continue to benefit from skilled outpatient physical therapy to address the deficits listed in the problem list box on initial evaluation, provide pt/family education and to maximize pt's level of independence in the home and community environment.     Pt's spiritual, cultural and educational needs considered and pt agreeable to plan of care and goals.     Anticipated barriers to physical therapy: none at this time    Goals:     1.Report decreased low back pain  < / =  3/10  to increase tolerance for exercise  2. Increase ROM by 15 degrees where limited in order to perform ADLs without difficulty.  3. Increase strength by 1/3 MMT grade in gluteal strength  to increase tolerance for ADL and work activities.  4. Pt to tolerate HEP to improve ROM and independence with ADL's     Long Term Goals: 12 to 24 weeks  1.Report decreased low back  pain < / = 2/10  to increase tolerance for ADLs  2.Patient goal: Reduce pain  3.Increase strength to 4+/5 in  Gluteal  muscles  to increase tolerance for ADL and work activities.    PLAN     Progress POC as tolerated by the patient.    Usha Mix, PTA

## 2022-02-16 ENCOUNTER — TELEPHONE (OUTPATIENT)
Dept: SMOKING CESSATION | Facility: CLINIC | Age: 52
End: 2022-02-16
Payer: MEDICARE

## 2022-02-16 NOTE — TELEPHONE ENCOUNTER
Smoking Cessation counselor attempted to contact patient regarding her smoking progress, but patient did not answer.  Counselor left a message requesting a return call.      Katharine Rodríguez RRT,MSW,LMSW,TTS  [phone]

## 2022-02-17 ENCOUNTER — CLINICAL SUPPORT (OUTPATIENT)
Dept: REHABILITATION | Facility: HOSPITAL | Age: 52
End: 2022-02-17
Attending: NEUROLOGICAL SURGERY
Payer: MEDICARE

## 2022-02-17 DIAGNOSIS — G89.29 CHRONIC LEFT-SIDED LOW BACK PAIN WITH LEFT-SIDED SCIATICA: ICD-10-CM

## 2022-02-17 DIAGNOSIS — M54.42 CHRONIC LEFT-SIDED LOW BACK PAIN WITH LEFT-SIDED SCIATICA: ICD-10-CM

## 2022-02-17 DIAGNOSIS — I10 BENIGN ESSENTIAL HTN: ICD-10-CM

## 2022-02-17 PROCEDURE — 97113 AQUATIC THERAPY/EXERCISES: CPT | Mod: HCNC,CQ

## 2022-02-17 RX ORDER — LOSARTAN POTASSIUM 100 MG/1
TABLET ORAL
Qty: 90 TABLET | Refills: 0 | Status: SHIPPED | OUTPATIENT
Start: 2022-02-17 | End: 2022-03-29 | Stop reason: SDUPTHER

## 2022-02-17 RX ORDER — MONTELUKAST SODIUM 10 MG/1
TABLET ORAL
Qty: 30 TABLET | Refills: 2 | Status: SHIPPED | OUTPATIENT
Start: 2022-02-17 | End: 2022-03-08 | Stop reason: SDUPTHER

## 2022-02-17 NOTE — PROGRESS NOTES
[facility]   Physical Therapy Treatment Note     Name: Yanni Kaiser  Clinic Number: [id number]    Therapy Diagnosis:   No diagnosis found.  Physician: Will Prabhakar MD    Visit Date: 2/17/2022      Physician: Will Prabhakar MD     Physician Orders: PT Eval and Treat   Medical Diagnosis from Referral:   E66.01,Z68.44 (ICD-10-CM) - Morbid obesity with BMI of 60.0-69.9, adult   M54.6 (ICD-10-CM) - Acute bilateral thoracic back pain   M54.17 (ICD-10-CM) - Lumbosacral radiculopathy at L4      Evaluation Date: 1/4/2022  Authorization Period Expiration: 03/31/2022  Plan of Care Expiration: 3/31/2022  Visit # / Visits authorized: 8/20 + Eval  PTA Visit #: 1/6     Time In: 1010   Time Out: 1120  Total Billable Time: 60 minutes    SUBJECTIVE     Pt reports: she is feeling much better than last time.  Improved pain level    She was compliant with home exercise program.    Response to previous treatment: fatigue  Functional change: none at this time    Pain: 6/10  Location: bilateral back     OBJECTIVE     Objective Measures updated at progress report unless specified.     Treatment     Yanni received aquatic therapeutic exercises to develop strength, endurance, ROM, flexibility, posture, and core stabilization for 60 minutes including:    FUNCTIONAL MOBILITY TRAINING x 2 laps each at beginning and 1 lap each at end of session  Walk forward/backward/lateral    STRETCHES 2 x 30sec  Calf  hamstring    LE EX x 25       3# ankle weights   Squat  Heel raise with gluteal set  Hip abduction/adduction with B yellow db  Hip flex/ext with B yellow db  LAQ  Leg press aqua noodle/blue tubing  Tripod clams    // Bars balance  SL balance ---with orange yuk ball dribble 1' each side  Tandem walk in // bars 2 laps  Wobble board 2' without use of hands for support   Step ups Red STEP x 20 ea  Theraband sidestepping with green theraband x 2 laps   Theraband monster walks with green theraband x 2  laps    UE EX/CORE  x20  Mini squat with push/pull red kickboard with back at wall  B shoulder row with green theratubing   B shoulder extension with green theratubing    ENDURANCE  Bicycle in // bars 5'  LTR x 2' in // bars  DKTC in // bars 2'    Patient Education and Home Exercises     Home Exercises Provided and Patient Education Provided     Education provided:   Role of aquatic therapy  Hydration post therapy    Written Home Exercises Provided: Patient instructed to cont prior HEP. Exercises were reviewed and Yanni was able to demonstrate them prior to the end of the session.  Yanni demonstrated good  understanding of the education provided. See EMR under Patient Instructions for exercises provided during therapy sessions    ASSESSMENT   The patient was able to perform all advanced exercises well today.  Mild increase in pain level upon completion that she rates at 8/10      Yanni Is progressing well towards her goals.   Pt prognosis is Good.     Pt will continue to benefit from skilled outpatient physical therapy to address the deficits listed in the problem list box on initial evaluation, provide pt/family education and to maximize pt's level of independence in the home and community environment.     Pt's spiritual, cultural and educational needs considered and pt agreeable to plan of care and goals.     Anticipated barriers to physical therapy: none at this time    Goals:     1.Report decreased low back pain  < / =  3/10  to increase tolerance for exercise  2. Increase ROM by 15 degrees where limited in order to perform ADLs without difficulty.  3. Increase strength by 1/3 MMT grade in gluteal strength  to increase tolerance for ADL and work activities.  4. Pt to tolerate HEP to improve ROM and independence with ADL's     Long Term Goals: 12 to 24 weeks  1.Report decreased low back  pain < / = 2/10  to increase tolerance for ADLs  2.Patient goal: Reduce pain  3.Increase strength to 4+/5 in  Gluteal  muscles  to increase tolerance for ADL and work activities.    PLAN     Progress POC as tolerated by the patient.    Usha Mix, PTA

## 2022-02-17 NOTE — TELEPHONE ENCOUNTER
This Rx Request does not qualify for assessment with the OR   Please review protocol details and the Care Due Message for extra clinical information    Reasons Rx Request may be deferred:  1. Labs/Vitals overdue  2. Labs/Vitals abnormal  3. Patient has been seen in the ED/Hospital since the last PCP visit  4. Medication is non-delegated  5. Provider is a non-participating provider

## 2022-02-17 NOTE — TELEPHONE ENCOUNTER
No new care gaps identified.  Powered by [employer]. Reference number: [id number].   2/17/2022 5:58:23 AM CST

## 2022-02-18 ENCOUNTER — HOSPITAL ENCOUNTER (OUTPATIENT)
Facility: OTHER | Age: 52
Discharge: HOME | End: 2022-02-18
Attending: ANESTHESIOLOGY | Admitting: ANESTHESIOLOGY
Payer: MEDICARE

## 2022-02-18 VITALS
RESPIRATION RATE: 14 BRPM | HEART RATE: 77 BPM | TEMPERATURE: 99 F | HEIGHT: 64 IN | BODY MASS INDEX: 50.02 KG/M2 | DIASTOLIC BLOOD PRESSURE: 99 MMHG | WEIGHT: 293 LBS | OXYGEN SATURATION: 98 % | SYSTOLIC BLOOD PRESSURE: 144 MMHG

## 2022-02-18 DIAGNOSIS — G89.29 CHRONIC PAIN: ICD-10-CM

## 2022-02-18 DIAGNOSIS — M47.816 SPONDYLOSIS OF LUMBAR REGION WITHOUT MYELOPATHY OR RADICULOPATHY: ICD-10-CM

## 2022-02-18 DIAGNOSIS — M47.816 LUMBAR SPONDYLOSIS: Primary | ICD-10-CM

## 2022-02-18 LAB — POCT GLUCOSE: 76 MG/DL (ref 70–110)

## 2022-02-18 PROCEDURE — 82947 ASSAY GLUCOSE BLOOD QUANT: CPT | Mod: HCNC | Performed by: ANESTHESIOLOGY

## 2022-02-18 PROCEDURE — 64494 PR INJ DX/THER AGNT PARAVERT FACET JOINT,IMG GUIDE,LUMBAR/SAC, 2ND LEVEL: ICD-10-PCS | Mod: 50,KX,HCNC, | Performed by: ANESTHESIOLOGY

## 2022-02-18 PROCEDURE — 64494 INJ PARAVERT F JNT L/S 2 LEV: CPT | Mod: 50,KX,HCNC, | Performed by: ANESTHESIOLOGY

## 2022-02-18 PROCEDURE — 64493 INJ PARAVERT F JNT L/S 1 LEV: CPT | Mod: 50,KX,HCNC | Performed by: ANESTHESIOLOGY

## 2022-02-18 PROCEDURE — 64494 INJ PARAVERT F JNT L/S 2 LEV: CPT | Mod: 50,KX,HCNC | Performed by: ANESTHESIOLOGY

## 2022-02-18 PROCEDURE — 64493 INJ PARAVERT F JNT L/S 1 LEV: CPT | Mod: 50,KX,HCNC, | Performed by: ANESTHESIOLOGY

## 2022-02-18 PROCEDURE — 64493 PR INJ DX/THER AGNT PARAVERT FACET JOINT,IMG GUIDE,LUMBAR/SAC,1ST LVL: ICD-10-PCS | Mod: 50,KX,HCNC, | Performed by: ANESTHESIOLOGY

## 2022-02-18 PROCEDURE — 25500020 PHARM REV CODE 255: Mod: HCNC | Performed by: ANESTHESIOLOGY

## 2022-02-18 PROCEDURE — 25000003 PHARM REV CODE 250: Mod: HCNC | Performed by: ANESTHESIOLOGY

## 2022-02-18 RX ORDER — ALPRAZOLAM 0.5 MG/1
1 TABLET ORAL ONCE
Status: COMPLETED | OUTPATIENT
Start: 2022-02-18 | End: 2022-02-18

## 2022-02-18 RX ORDER — SODIUM CHLORIDE 9 MG/ML
INJECTION, SOLUTION INTRAVENOUS CONTINUOUS
Status: DISCONTINUED | OUTPATIENT
Start: 2022-02-19 | End: 2022-02-18 | Stop reason: HOSPADM

## 2022-02-18 RX ORDER — LIDOCAINE HYDROCHLORIDE 20 MG/ML
INJECTION, SOLUTION INFILTRATION; PERINEURAL
Status: DISCONTINUED | OUTPATIENT
Start: 2022-02-18 | End: 2022-02-18 | Stop reason: HOSPADM

## 2022-02-18 RX ORDER — BUPIVACAINE HYDROCHLORIDE 2.5 MG/ML
INJECTION, SOLUTION EPIDURAL; INFILTRATION; INTRACAUDAL
Status: DISCONTINUED | OUTPATIENT
Start: 2022-02-18 | End: 2022-02-18 | Stop reason: HOSPADM

## 2022-02-18 RX ADMIN — ALPRAZOLAM 1 MG: 0.5 TABLET ORAL at 10:02

## 2022-02-18 NOTE — DISCHARGE INSTRUCTIONS

## 2022-02-18 NOTE — OP NOTE
Diagnostic Lumbar Medial Branch Block Under Fluoroscopy    The procedure, risks, benefits, and options were discussed with the patient. There are no contraindications to the procedure. The patent expressed understanding and agreed to the procedure. Informed written consent was obtained prior to the start of the procedure and can be found in the patient's chart.    PATIENT NAME: Mrs. Yanni Kaiser   MRN: [id number]     DATE OF PROCEDURE: 02/18/2022                                           PROCEDURE:  Diagnostic Bilateral L3, L4 and L5 Lumbar Medial Branch Block under Fluoroscopy    PRE-OP DIAGNOSIS: Lumbar spondylosis [M47.816]    POST-OP DIAGNOSIS: Same    PHYSICIAN: Lawrence Marin MD    ASSISTANTS: Dr. Mcdermott    MEDICATIONS INJECTED:  Bupivicaine 0.25%    LOCAL ANESTHETIC INJECTED:   Xylocaine 2%    SEDATION: None    ESTIMATED BLOOD LOSS:  None    COMPLICATIONS:  None.    INTERVAL HISTORY: Patient has clinical and imaging findings suggestive of facet mediated pain.    TECHNIQUE: Time-out was performed to identify the patient and procedure to be performed. With the patient laying in a prone position, the surgical area was prepped and draped in the usual sterile fashion using ChloraPrep and fenestrated drape. The levels were determined under fluoroscopic guidance. Skin anesthesia was achieved by injecting Lidocaine 2% over the injection sites. A 22 gauge, 5 inch needle was introduced into the medial branch nerves at the junctions of the superior articular process and the transverse processes of the targeted sites using AP, lateral and/or contralateral oblique fluoroscopic imaging. After negative aspiration for blood or CSF was confirmed, 1 mL of the anesthetic listed above was then slowly injected at each site. The needles were removed and bleeding was nil. A sterile dressing was applied. No specimens collected. The patient tolerated the procedure well.     The patient was monitored after the procedure in the  recovery area. They were given post-procedure and discharge instructions to follow at home. The patient was discharged in a stable condition.    I reviewed and edited the fellow's note. I conducted my own interview and physical examination. I agree with the findings. I was present and supervising all critical portions of the procedure.    Lawrence Marin MD

## 2022-02-21 ENCOUNTER — CLINICAL SUPPORT (OUTPATIENT)
Dept: REHABILITATION | Facility: HOSPITAL | Age: 52
End: 2022-02-21
Attending: NEUROLOGICAL SURGERY
Payer: MEDICARE

## 2022-02-21 DIAGNOSIS — M54.41 CHRONIC LEFT-SIDED LOW BACK PAIN WITH BILATERAL SCIATICA: Primary | ICD-10-CM

## 2022-02-21 DIAGNOSIS — M54.42 CHRONIC LEFT-SIDED LOW BACK PAIN WITH BILATERAL SCIATICA: Primary | ICD-10-CM

## 2022-02-21 DIAGNOSIS — G89.29 CHRONIC LEFT-SIDED LOW BACK PAIN WITH BILATERAL SCIATICA: Primary | ICD-10-CM

## 2022-02-21 PROCEDURE — 97113 AQUATIC THERAPY/EXERCISES: CPT | Mod: HCNC

## 2022-02-21 NOTE — PROGRESS NOTES
[facility]   Physical Therapy Treatment Note     Name: Yanni Kaiser  Clinic Number: [id number]    Therapy Diagnosis:   Encounter Diagnosis   Name Primary?    Chronic left-sided low back pain with bilateral sciatica Yes     Physician: Will Prabhakar MD    Visit Date: 2/21/2022      Physician: Will Prabhakar MD     Physician Orders: PT Eval and Treat   Medical Diagnosis from Referral:   E66.01,Z68.44 (ICD-10-CM) - Morbid obesity with BMI of 60.0-69.9, adult   M54.6 (ICD-10-CM) - Acute bilateral thoracic back pain   M54.17 (ICD-10-CM) - Lumbosacral radiculopathy at L4      Evaluation Date: 1/4/2022  Authorization Period Expiration: 03/31/2022  Plan of Care Expiration: 3/31/2022  Visit # / Visits authorized: 12/20 + Eval  PTA Visit #: 0/6     Time In: 10:30 am  Time Out: 11:30 am    Total Billable Time: 30 minutes    SUBJECTIVE     Pt reports: her left leg is bothering her more. She stated she has some days where the left leg is bad.    She was compliant with home exercise program.    Response to previous treatment: fatigue  Functional change: none at this time    Pain: 6/10  Location: bilateral back     OBJECTIVE     Objective Measures updated at progress report unless specified.     Treatment     Yanni received aquatic therapeutic exercises to develop strength, endurance, ROM, flexibility, posture, and core stabilization for 60 minutes including:    FUNCTIONAL MOBILITY TRAINING x 2 laps each at beginning and 1 lap each at end of session  Walk forward/backward/lateral    STRETCHES 2 x 30sec  Calf  hamstring    LE EX x 25     3.75# ankle weights   Squat  Heel raise with gluteal set  Hip abduction/adduction with B yellow db  Hip flex/ext with B yellow db  LAQ  Leg press aqua noodle/blue tubing  Tripod clams    // Bars balance  SL balance ---with orange yuk ball dribble 1' each side  Tandem walk in // bars 2 laps  Wobble board 2' without use of hands for support   Step ups  Red STEP x 20 ea  Theraband sidestepping with green theraband x 2 laps   Theraband monster walks with green theraband x 2 laps    UE EX/CORE  x20  Mini squat with push/pull red kickboard with back at wall  B shoulder row with green theratubing   B shoulder extension with green theratubing    ENDURANCE  Bicycle in // bars 5'  LTR x 2' in // bars  DKTC in // bars 2'    Patient Education and Home Exercises     Home Exercises Provided and Patient Education Provided     Education provided:   Role of aquatic therapy  Hydration post therapy    Written Home Exercises Provided: Patient instructed to cont prior HEP. Exercises were reviewed and Yanni was able to demonstrate them prior to the end of the session.  Yanni demonstrated good  understanding of the education provided. See EMR under Patient Instructions for exercises provided during therapy sessions    ASSESSMENT   The patient reported some left leg heaviness which led to some greater difficulty with balance activities and greater use of hands for support. She was able to complete all reps and sets.      Yanni Is progressing well towards her goals.   Pt prognosis is Good.     Pt will continue to benefit from skilled outpatient physical therapy to address the deficits listed in the problem list box on initial evaluation, provide pt/family education and to maximize pt's level of independence in the home and community environment.     Pt's spiritual, cultural and educational needs considered and pt agreeable to plan of care and goals.     Anticipated barriers to physical therapy: none at this time    Goals:     1.Report decreased low back pain  < / =  3/10  to increase tolerance for exercise  2. Increase ROM by 15 degrees where limited in order to perform ADLs without difficulty.  3. Increase strength by 1/3 MMT grade in gluteal strength  to increase tolerance for ADL and work activities.  4. Pt to tolerate HEP to improve ROM and independence with ADL's     Long  Term Goals: 12 to 24 weeks  1.Report decreased low back  pain < / = 2/10  to increase tolerance for ADLs  2.Patient goal: Reduce pain  3.Increase strength to 4+/5 in  Gluteal muscles  to increase tolerance for ADL and work activities.    PLAN     Progress POC as tolerated by the patient.    Arti Rodriguez, PT

## 2022-02-23 ENCOUNTER — CLINICAL SUPPORT (OUTPATIENT)
Dept: REHABILITATION | Facility: HOSPITAL | Age: 52
End: 2022-02-23
Attending: NEUROLOGICAL SURGERY
Payer: MEDICARE

## 2022-02-23 DIAGNOSIS — M62.89 PELVIC FLOOR DYSFUNCTION: Primary | ICD-10-CM

## 2022-02-23 PROCEDURE — 97113 AQUATIC THERAPY/EXERCISES: CPT | Mod: HCNC

## 2022-02-23 NOTE — PROGRESS NOTES
[facility]   Physical Therapy Treatment Note     Name: Yanni Kaiser  Clinic Number: [id number]    Therapy Diagnosis:   No diagnosis found.  Physician: Will Prabhakar MD    Visit Date: 2/23/2022      Physician: Will Prabhakar MD     Physician Orders: PT Eval and Treat   Medical Diagnosis from Referral:   E66.01,Z68.44 (ICD-10-CM) - Morbid obesity with BMI of 60.0-69.9, adult   M54.6 (ICD-10-CM) - Acute bilateral thoracic back pain   M54.17 (ICD-10-CM) - Lumbosacral radiculopathy at L4      Evaluation Date: 1/4/2022  Authorization Period Expiration: 03/31/2022  Plan of Care Expiration: 3/31/2022  Visit # / Visits authorized: 13/20 + Eval  PTA Visit #: 0/6     Time In: 10:05 am  Time Out: 11:05 am    Total Billable Time: 60 minutes    SUBJECTIVE     Pt reports: R ankle/foot pain is more painful than the knee today.     She was compliant with home exercise program.    Response to previous treatment: fatigue  Functional change: none at this time    Pain: 6/10  Location: R ankle    OBJECTIVE     Objective Measures updated at progress report unless specified.     Treatment     Yanni received aquatic therapeutic exercises to develop strength, endurance, ROM, flexibility, posture, and core stabilization for 60 minutes including:    FUNCTIONAL MOBILITY TRAINING x 2 laps each at beginning and 1 lap each at end of session  Walk forward/backward/lateral    STRETCHES 2 x 30sec  Calf  hamstring    LE EX x 25     3.75# ankle weights   Squat  Heel raise with gluteal set  Hip abduction/adduction with B yellow db  Hip flex/ext with B yellow db  LAQ  Leg press aqua noodle/blue tubing  Tripod clams    // Bars balance  SL balance ---with orange yuk ball dribble 1' each side  Tandem walk in // bars 2 laps  Wobble board 2' without use of hands for support   Step ups Red STEP x 20 ea  Theraband sidestepping with green theraband x 2 laps   Theraband monster walks with green theraband x 2  laps    UE EX/CORE  x20  Mini squat with push/pull red kickboard with back at wall  B shoulder row with green theratubing   B shoulder extension with green theratubing    ENDURANCE  Bicycle in // bars 5'  LTR x 2' in // bars  DKTC in // bars 2'    Patient Education and Home Exercises     Home Exercises Provided and Patient Education Provided     Education provided:   Role of aquatic therapy  Hydration post therapy    Written Home Exercises Provided: Patient instructed to cont prior HEP. Exercises were reviewed and Yanni was able to demonstrate them prior to the end of the session.  Yanni demonstrated good  understanding of the education provided. See EMR under Patient Instructions for exercises provided during therapy sessions    ASSESSMENT   Patient was able to complete all exercises today without report of increase in symptoms. Occasional UE support required with step ups. Some relief reported from R ankle/ffot pain after calf stretch.      Yanni Is progressing well towards her goals.   Pt prognosis is Good.     Pt will continue to benefit from skilled outpatient physical therapy to address the deficits listed in the problem list box on initial evaluation, provide pt/family education and to maximize pt's level of independence in the home and community environment.     Pt's spiritual, cultural and educational needs considered and pt agreeable to plan of care and goals.     Anticipated barriers to physical therapy: none at this time    Goals:     1.Report decreased low back pain  < / =  3/10  to increase tolerance for exercise  2. Increase ROM by 15 degrees where limited in order to perform ADLs without difficulty.  3. Increase strength by 1/3 MMT grade in gluteal strength  to increase tolerance for ADL and work activities.  4. Pt to tolerate HEP to improve ROM and independence with ADL's     Long Term Goals: 12 to 24 weeks  1.Report decreased low back  pain < / = 2/10  to increase tolerance for  ADLs  2.Patient goal: Reduce pain  3.Increase strength to 4+/5 in  Gluteal muscles  to increase tolerance for ADL and work activities.    PLAN     Progress POC as tolerated by the patient.    Ugo Zavaleat, PT

## 2022-02-25 ENCOUNTER — TELEPHONE (OUTPATIENT)
Dept: NEUROSURGERY | Facility: CLINIC | Age: 52
End: 2022-02-25
Payer: MEDICARE

## 2022-03-02 ENCOUNTER — PATIENT MESSAGE (OUTPATIENT)
Dept: UROGYNECOLOGY | Facility: CLINIC | Age: 52
End: 2022-03-02
Payer: MEDICARE

## 2022-03-02 ENCOUNTER — TELEPHONE (OUTPATIENT)
Dept: BARIATRICS | Facility: CLINIC | Age: 52
End: 2022-03-02
Payer: MEDICARE

## 2022-03-07 ENCOUNTER — PATIENT MESSAGE (OUTPATIENT)
Dept: BARIATRICS | Facility: CLINIC | Age: 52
End: 2022-03-07
Payer: MEDICARE

## 2022-03-08 ENCOUNTER — OFFICE VISIT (OUTPATIENT)
Dept: ALLERGY | Facility: CLINIC | Age: 52
End: 2022-03-08
Payer: MEDICARE

## 2022-03-08 VITALS — OXYGEN SATURATION: 97 % | WEIGHT: 293 LBS | HEIGHT: 64 IN | HEART RATE: 97 BPM | BODY MASS INDEX: 50.02 KG/M2

## 2022-03-08 DIAGNOSIS — J30.9 ALLERGIC RHINITIS, UNSPECIFIED SEASONALITY, UNSPECIFIED TRIGGER: Primary | ICD-10-CM

## 2022-03-08 DIAGNOSIS — J45.909 ASTHMA, UNSPECIFIED ASTHMA SEVERITY, UNSPECIFIED WHETHER COMPLICATED, UNSPECIFIED WHETHER PERSISTENT: ICD-10-CM

## 2022-03-08 DIAGNOSIS — L30.9 DERMATITIS: ICD-10-CM

## 2022-03-08 PROCEDURE — 99499 RISK ADDL DX/OHS AUDIT: ICD-10-PCS | Mod: S$GLB,,, | Performed by: STUDENT IN AN ORGANIZED HEALTH CARE EDUCATION/TRAINING PROGRAM

## 2022-03-08 PROCEDURE — 99214 PR OFFICE/OUTPT VISIT, EST, LEVL IV, 30-39 MIN: ICD-10-PCS | Mod: HCNC,GC,S$GLB, | Performed by: STUDENT IN AN ORGANIZED HEALTH CARE EDUCATION/TRAINING PROGRAM

## 2022-03-08 PROCEDURE — 99999 PR PBB SHADOW E&M-EST. PATIENT-LVL V: ICD-10-PCS | Mod: PBBFAC,HCNC,GC, | Performed by: STUDENT IN AN ORGANIZED HEALTH CARE EDUCATION/TRAINING PROGRAM

## 2022-03-08 PROCEDURE — 99499 UNLISTED E&M SERVICE: CPT | Mod: S$GLB,,, | Performed by: STUDENT IN AN ORGANIZED HEALTH CARE EDUCATION/TRAINING PROGRAM

## 2022-03-08 PROCEDURE — 99214 OFFICE O/P EST MOD 30 MIN: CPT | Mod: HCNC,GC,S$GLB, | Performed by: STUDENT IN AN ORGANIZED HEALTH CARE EDUCATION/TRAINING PROGRAM

## 2022-03-08 PROCEDURE — 99999 PR PBB SHADOW E&M-EST. PATIENT-LVL V: CPT | Mod: PBBFAC,HCNC,GC, | Performed by: STUDENT IN AN ORGANIZED HEALTH CARE EDUCATION/TRAINING PROGRAM

## 2022-03-08 RX ORDER — EPINEPHRINE 0.3 MG/.3ML
1 INJECTION SUBCUTANEOUS ONCE
Qty: 2 EACH | Refills: 1 | OUTPATIENT
Start: 2022-03-08 | End: 2023-02-24

## 2022-03-08 RX ORDER — BUDESONIDE AND FORMOTEROL FUMARATE DIHYDRATE 160; 4.5 UG/1; UG/1
2 AEROSOL RESPIRATORY (INHALATION) EVERY 12 HOURS
Qty: 10.2 G | Refills: 11 | Status: SHIPPED | OUTPATIENT
Start: 2022-03-08 | End: 2022-07-13 | Stop reason: SDUPTHER

## 2022-03-08 RX ORDER — MONTELUKAST SODIUM 10 MG/1
10 TABLET ORAL NIGHTLY
Qty: 30 TABLET | Refills: 11 | Status: SHIPPED | OUTPATIENT
Start: 2022-03-08 | End: 2022-04-13

## 2022-03-08 RX ORDER — FLUTICASONE PROPIONATE 50 MCG
2 SPRAY, SUSPENSION (ML) NASAL 2 TIMES DAILY
Qty: 16 G | Refills: 11 | Status: SHIPPED | OUTPATIENT
Start: 2022-03-08 | End: 2022-07-13 | Stop reason: SDUPTHER

## 2022-03-08 RX ORDER — ALBUTEROL SULFATE 0.83 MG/ML
2.5 SOLUTION RESPIRATORY (INHALATION) EVERY 6 HOURS PRN
Qty: 3 ML | Refills: 11 | Status: SHIPPED | OUTPATIENT
Start: 2022-03-08 | End: 2022-11-02 | Stop reason: SDUPTHER

## 2022-03-08 NOTE — PROGRESS NOTES
"ALLERGY & IMMUNOLOGY CLINIC - FOLLOW UP     HISTORY OF PRESENT ILLNESS     Patient ID: Yanni Kaiser is a 51 y.o. female    CC: "observed dose of odactra"    HPI: Ms. Kaiser is a 51 year old female with a history of chronic rhinosinusitus and asthma who presents to clinic today for observed first dose of odactra. She is having mild nasal congestion but denies fever, chills, SOB, wheezing, abdominal pain, nausea, vomiting, diarrhea, lightheadedness. She would like to proceed with receiving odactra today.    Allergic rhinitis: Currently using nasal rinse per ENT BID and flonase 2 SEN BID. She is unable to tolerate taste of azelastine. Symptoms remain uncontrolled thus she would like to start odactra.    Asthma: Currently using Symbicort 2 puffs BID and albuterol MDI/nebulizer as needed. She last used albuterol one month ago. She is also taking montelukast and denies mood changes or SI with this medication.    Rash: Today she complains of a rash that is constant and described as a raised, hyperpigmented, and pruritic lesion located on her R calf and thigh and L leg. She would like a referral to dermatology for evaluation.     REVIEW OF SYSTEMS     Review of Systems   Constitutional: Negative for chills and fever.   HENT: Positive for congestion. Negative for ear pain and sore throat.    Eyes: Negative for pain and redness.   Respiratory: Positive for cough. Negative for shortness of breath and wheezing.    Gastrointestinal: Negative for abdominal pain, diarrhea, nausea and vomiting.   Skin: Positive for itching and rash.     Balance of review of systems negative except as mentioned above     MEDICAL HISTORY     MedHx: active problems reviewed  SurgHx:   Past Surgical History:   Procedure Laterality Date    ANTROSTOMY OF MAXILLARY SINUS AT INFERIOR NASAL MEATUS  10/22/2021    Procedure: MAXILLARY ANTROSTOMY, AT INFERIOR NASAL MEATUS;  Surgeon: John Prado III, MD;  Location: [facility];  Service: ENT;;    " BREAST CYST ASPIRATION       SECTION      X 1    CHOLECYSTECTOMY      COLONOSCOPY      COLONOSCOPY N/A 2019    Procedure: COLONOSCOPY;  Surgeon: Jagruti Sweeney MD;  [facility] (2ND FLR);  Service: Endoscopy;  Laterality: N/A;  BMI is 63/gastroparesis/3 days full liquid diet 1 day clear liquid see telephone encounter by Ciara Pinto [facility]    EPIDURAL STEROID INJECTION N/A 2020    Procedure: INJECTION, STEROID, EPIDURAL, L5-S1 IL;  Surgeon: Lawrence Marin MD;  Location: [facility];  Service: Pain Management;  Laterality: N/A;    ESOPHAGEAL MANOMETRY WITH MEASUREMENT OF IMPEDANCE N/A 2019    Procedure: MANOMETRY-ESOPHAGEAL-WITH IMPEDANCE;  Surgeon: Jagruti Sweeney MD;  [facility]  Service: Endoscopy;  Laterality: N/A;  Hold Narcotics x 1 days   Hold TCA x 1 days  Propofol only. No fentanyl or benzodiazepine during sedation. If additional sedation needed, discuss with Dr. Sweeney.  10/29 - pt confirmed appt    ESOPHAGOGASTRODUODENOSCOPY N/A 2019    Procedure: EGD (ESOPHAGOGASTRODUODENOSCOPY);  Surgeon: Jagruti Sweeney MD;  [facility]  Service: Endoscopy;  Laterality: N/A;  BMI is 63/gastroparesis/3 days full liquid diet 1 day clear liquid see telephone encounter by Ciara Pinto [facility]    ESOPHAGOGASTRODUODENOSCOPY N/A 2019    Procedure: ESOPHAGOGASTRODUODENOSCOPY (EGD);  Surgeon: Jagruti Sweeney MD;  [facility]  Service: Endoscopy;  Laterality: N/A;  EGD with EndoFlip /+/- Botox  2nd floor for gastroparesis/BMI 63 **367lbs**  Bariatric Stretcher needed  Manometry probe to be placed endoscopically during EGD procedure  Due to change in protocol, Full liquid diet for 3 days/ 1 day of clear liquids  Hi    ESOPHAGOGASTRODUODENOSCOPY N/A 2020    Procedure: ESOPHAGOGASTRODUODENOSCOPY (EGD) with BRAVO;  Surgeon: Jagruti Sweeney MD;  [facility] ([facility] FLR);  Service: Endoscopy;  Laterality:  N/A;  with Dilation  2nd floor due to BMI 56.20 (327 lbs) and gastroparesis  3 days full liquid diet and 1 day clears    ESOPHAGOGASTRODUODENOSCOPY N/A 4/15/2021    Procedure: ESOPHAGOGASTRODUODENOSCOPY (EGD);  Surgeon: Jagruti Sweeney MD;  Location: [facility] (2ND FLR);  Service: Endoscopy;  Laterality: N/A;  Endoflip  2nd floor-gastroparesis, BMI 57.18, bariatric stretcher  full liquid diet x3 days, clear liquid diet x1 day prior to procedure  propofol only  covid test 4/12 primary care, instructions emailed-[facility] [place]    FOOT FRACTURE SURGERY Left     FUNCTIONAL ENDOSCOPIC SINUS SURGERY (FESS) USING COMPUTER-ASSISTED NAVIGATION Bilateral 10/22/2021    Procedure: FESS, USING COMPUTER-ASSISTED NAVIGATION;  Surgeon: John Prado III, MD;  Location: [facility];  Service: ENT;  Laterality: Bilateral;  FOLLOW DR PRADO ANESTHESIA PROTOCAL / pt will stay 23 hour post surgery for SHARAHT observation    HYSTEROSCOPY WITH DILATION AND CURETTAGE OF UTERUS N/A 10/16/2018    [facility]    INJECTION OF ANESTHETIC AGENT AROUND NERVE Bilateral 10/23/2020    Procedure: BLOCK, NERVE  bilateral L3,4,5 MBB;  Surgeon: Lawrence Marin MD;  Location: [facility];  Service: Pain Management;  Laterality: Bilateral;  bilateral L3,4,5 MBB   consent needed    INJECTION OF ANESTHETIC AGENT AROUND NERVE Bilateral 2/18/2022    Procedure: BLOCK, NERVE, L3-L4-L5 MEDIAL BRANCH;  Surgeon: Lawrence Marin MD;  Location: [facility];  Service: Pain Management;  Laterality: Bilateral;    PLANTAR FASCIOTOMY Left 11/18/2019    Procedure: FASCIOTOMY, PLANTAR;  Surgeon: Valentino Orourke DPM;  Location: [facility] 2ND FLR;  Service: Podiatry;  Laterality: Left;    RETINAL LASER PROCEDURE Left     SINUS SURGERY      SPHENOIDECTOMY Bilateral 10/22/2021    Procedure: SPHENOIDECTOMY;  Surgeon: John Prado III, MD;  Location: [facility];  Service: ENT;  Laterality: Bilateral;    TRANSFORAMINAL EPIDURAL INJECTION OF STEROID Bilateral 6/24/2020    Procedure:  INJECTION, STEROID, EPIDURAL, TRANSFORAMINAL APPROACH;  Surgeon: Lawrence Marin MD;  Location: [facility];  Service: Pain Management;  Laterality: Bilateral;    TRANSFORAMINAL EPIDURAL INJECTION OF STEROID Bilateral 1/28/2022    Procedure: INJECTION, STEROID, EPIDURAL, TRANSFORAMINAL APPROACH  Bilateral TFESI L4/L5      NEEDS CONSENT;  Surgeon: Lawrence Marin MD;  Location: [facility];  Service: Pain Management;  Laterality: Bilateral;    UPPER GASTROINTESTINAL ENDOSCOPY       Allergies: see below  Medications: MAR reviewed     PHYSICAL EXAM     Physical Exam  Constitutional:       Appearance: Normal appearance.   HENT:      Head: Normocephalic and atraumatic.      Right Ear: External ear normal.      Left Ear: External ear normal.      Nose:      Comments: 3+ pale nasal turbinates     Mouth/Throat:      Mouth: Mucous membranes are moist.   Eyes:      Extraocular Movements: Extraocular movements intact.      Conjunctiva/sclera: Conjunctivae normal.      Pupils: Pupils are equal, round, and reactive to light.   Cardiovascular:      Rate and Rhythm: Normal rate and regular rhythm.   Pulmonary:      Effort: Pulmonary effort is normal. No respiratory distress.      Breath sounds: Normal breath sounds. No wheezing or rales.   Musculoskeletal:      Cervical back: Normal range of motion.   Skin:     General: Skin is warm and dry.      Findings: Rash (see images) present.   Neurological:      General: No focal deficit present.      Mental Status: She is alert and oriented to person, place, and time.   Psychiatric:         Mood and Affect: Mood normal.         Behavior: Behavior normal.         Thought Content: Thought content normal.         Judgment: Judgment normal.                      LABORATORY STUDIES     Reviewed     ALLERGEN TESTING     Component      Latest Ref Rng & Units 1/4/2018 1/4/2018 1/4/2018          10:57 AM 10:57 AM 10:57 AM   D. farinae      <0.35 kU/L     0.43 (H)   D. farinae Class            CLASS I   Mite Dust Pteronyssinus IgE      <0.35 kU/L     0.43 (H)   D. pteronyssinus Class           CLASS I   BERMUDA GRASS      <0.35 kU/L     <0.35   Bermuda Grass Class           CLASS 0   Valentino Grass      <0.35 kU/L     <0.35   Valentino Grass Class           CLASS 0   Clearfield IgE      <0.35 kU/L     <0.35   [place] Class           CLASS 0   Plantain      <0.35 kU/L     <0.35   English Plantain Class           CLASS 0   White Oak(Quercus alba) IgE      <0.35 kU/L     <0.35   Ovid, Class           CLASS 0   Marshelder IgE      <0.35 kU/L     <0.35   Marshelder Class           CLASS 0   Ragweed, Western IgE      <0.35 kU/L     <0.35   Ragweed, Western, Class           CLASS 0   Alternaria alternata      <0.35 kU/L     <0.35   Altern. alternata Class           CLASS 0   Aspergillus Fumigatus IgE      <0.35 kU/L     <0.35   A. fumigatus Class           CLASS 0   Cat Dander      <0.35 kU/L     <0.35   Cat Epithelium Class           CLASS 0   Cockroach, IgE      <0.35 kU/L CLASS 0 <0.35     Dog Dander, IgE      <0.35 kU/L     <0.35   Dog Dander Class           CLASS 0   SHRIMP IGE      <0.35 kU/L     <0.35   Shrimp Class           CLASS 0   Crayfish      <0.35 kU/L     <0.35   Crayfish Class           CLASS 0   Crab      <0.35 kU/L     <0.35   Crab Class           CLASS 0   Lobster IgE      <0.35 kU/L     <0.35   Lobster Class           CLASS 0   IgE      0 - 100 IU/mL     59          PULMONARY FUNCTION     PFTs: ordered and pending     IMAGING & OTHER DIAGNOSTICS     No new images     ASSESSMENT & PLAN     Yanni Kaiser is a 51 y.o. female with     Allergic rhinitis: Patient underwent observation today after first dose of odactra and developed throat itching and bilateral ear itching. She was given PO ceterizine with improvement in symptoms. She was observed for one hour after dose was given. She was instructed on how to take the medication and teaching was done on how to use epi pen if she were to develop  signs of systemic reaction.  - Continue daily odactra and have epipen up to date and available.  - Continue flonase 2 SEN BID and sinus rinses per ENT BID    Asthma: controlled on current regimen  - Will reorder PFTs as not scheduled or performed  -  Continue daily montelukast, symbicort 2 puffs BID, and albuterol as needed for shortness of breath      Dermatitis: Pruritic hyperpigmented macules noted on b/l legs. Unsure of etiology at this time however not consistent with urticaria or allergic etiology  - Will place derm consult per patient request    Follow up: 3 months    Patient staffed with attending Dr. Mathieu Huerta MD  Allergy/Immunology Fellow

## 2022-03-10 ENCOUNTER — TELEPHONE (OUTPATIENT)
Dept: PAIN MEDICINE | Facility: CLINIC | Age: 52
End: 2022-03-10
Payer: MEDICARE

## 2022-03-10 DIAGNOSIS — Z74.09 IMPAIRED FUNCTIONAL MOBILITY AND ACTIVITY TOLERANCE: ICD-10-CM

## 2022-03-10 DIAGNOSIS — Z78.9 DECREASED ACTIVITIES OF DAILY LIVING (ADL): ICD-10-CM

## 2022-03-10 DIAGNOSIS — G89.4 CHRONIC PAIN DISORDER: Primary | ICD-10-CM

## 2022-03-10 NOTE — TELEPHONE ENCOUNTER
Called pt to check in as we discussed that I would do so when we met about FRP at the end of January. She said she is no longer in water therapy due to developing yeast infections. She is interested in getting evaluated for FRP. Orders placed for PT and OT and  will call her.

## 2022-03-14 RX ORDER — DICLOFENAC SODIUM 10 MG/G
2 GEL TOPICAL 2 TIMES DAILY
Qty: 100 G | Refills: 2 | Status: SHIPPED | OUTPATIENT
Start: 2022-03-14 | End: 2022-06-24 | Stop reason: SDUPTHER

## 2022-03-15 ENCOUNTER — CLINICAL SUPPORT (OUTPATIENT)
Dept: REHABILITATION | Facility: OTHER | Age: 52
End: 2022-03-15
Payer: MEDICARE

## 2022-03-15 DIAGNOSIS — G89.4 CHRONIC PAIN DISORDER: ICD-10-CM

## 2022-03-15 DIAGNOSIS — Z74.09 IMPAIRED FUNCTIONAL MOBILITY AND ACTIVITY TOLERANCE: ICD-10-CM

## 2022-03-15 DIAGNOSIS — Z78.9 ALTERATION IN PERFORMANCE OF ACTIVITIES OF DAILY LIVING: ICD-10-CM

## 2022-03-15 DIAGNOSIS — F33.42 RECURRENT MAJOR DEPRESSIVE DISORDER, IN FULL REMISSION: ICD-10-CM

## 2022-03-15 DIAGNOSIS — G89.29 CHRONIC LEFT-SIDED LOW BACK PAIN WITH LEFT-SIDED SCIATICA: ICD-10-CM

## 2022-03-15 DIAGNOSIS — F40.298 FEAR OF PAIN: ICD-10-CM

## 2022-03-15 DIAGNOSIS — Z74.09 IMPAIRED FUNCTIONAL MOBILITY, BALANCE, GAIT, AND ENDURANCE: Primary | ICD-10-CM

## 2022-03-15 DIAGNOSIS — Z78.9 DECREASED ACTIVITIES OF DAILY LIVING (ADL): ICD-10-CM

## 2022-03-15 DIAGNOSIS — M54.42 CHRONIC LEFT-SIDED LOW BACK PAIN WITH LEFT-SIDED SCIATICA: ICD-10-CM

## 2022-03-15 PROCEDURE — 97163 PT EVAL HIGH COMPLEX 45 MIN: CPT

## 2022-03-15 PROCEDURE — 97530 THERAPEUTIC ACTIVITIES: CPT

## 2022-03-15 PROCEDURE — 97167 OT EVAL HIGH COMPLEX 60 MIN: CPT

## 2022-03-15 NOTE — PROGRESS NOTES
"NOTE: This is a duplicate copy utilized for reassessment purposes & continuity of care.  See pt's plan of care for co-signed copy.    [facility]  Functional Restoration Program  Physical Therapy Initial Evaluation    Date: 3/15/2022   Name: Yanni Kaiser  [facility] Number: [id number]    Therapy Diagnosis:   Encounter Diagnoses   Name Primary?    Chronic pain disorder     Decreased activities of daily living (ADL)     Impaired functional mobility and activity tolerance     Impaired functional mobility, balance, gait, and endurance Yes    Chronic left-sided low back pain with left-sided sciatica     Recurrent major depressive disorder, in full remission      Physician: Snow Collazo, NP    Physician Orders: PT Eval and Treat   Medical Diagnosis from Referral:   G89.4 (ICD-10-CM) - Chronic pain disorder   Z78.9 (ICD-10-CM) - Decreased activities of daily living (ADL)   Z74.09 (ICD-10-CM) - Impaired functional mobility and activity tolerance       Evaluation Date: 3/15/2022  Authorization Period Expiration: 12/31/2022  Plan of Care Expiration: 6/10/2022  Visit # / Visits authorized: 1/ 1  FOTO: 1/ 3     Precautions: Standard, Diabetes and Fall    Time In: 11:00a  Time Out: 12:00p  Total Appointment Time (timed & untimed codes): 60 minutes      Subjective     Date of onset: 2-3 years ago    Patient reported history of current condition - Yanni reports: "It has been pain for years." While she has had pain for a very long time, her pain started to gradually getting worse about 2-3 years ago more greatly affecting her function. The pain is across her lower back & down the L LE, & surgery was not recommended. She has had several injections & some of them have helped for about 2-3 days at a time. She was trying aquatic therapy until last month & had to stop because she was getting yeast infections from the water, but she thinks it was helpful. The hardest part was getting out of " the pool & she enjoyed the weightlessness of the pool. She feels like she is struggling w/ just about everything, but especially getting out of the bed, getting out of the chair/car, bathing & getting dressed. She takes care of 4 of her 7 grandchildren after they get out of school as her kids are still working, which limits her ability to be in the program. She has trouble falling asleep & even more trouble staying asleep because of her pain. She used to have grandmaul siezures, but has not had any for about 5 years as she feels her medications are working.    Imaging, MRI studies: Impression:     Degenerative changes of the lumbar spine resulting in moderate spinal canal stenosis at L4-L5.  Multilevel neural foraminal narrowing, as above.  Overall appearance is similar to prior exam.     Electronically signed by resident: Nanette Guillory MD  Date:                                            11/17/2021  Time:                                           13:09     Electronically signed by: Chrystal Vo MD  Date:                                            11/17/2021  Time:                                           16:06    Prior Therapy: land PT, aquatics, injections medications  Social History: single story home w/ 0 SEFERINO lives with their family (, daughter & 3 grandkids)  Occupation: disabled since 2009   Prior Level of Function: able to work, could walk greater than 15 minutes, cooking w/out rest breaks  Current Level of Function: unable to lift pots, walking >5 minutes very difficult, unable to cook or clean w/out multiple rest breaks, difficulty dressing/bathing    Pain:      Current 6/10, worst 9/10, best 5/10   Location: low back, B hips, L LE & B feet (L worse)  Description: aching & stabbing  Aggravating Factors: sitting still, standing for too long, lifting (tries not to lift anything), bending  Easing Factors: pain gel, pain pill, lying down    Patient's FRP goals: improve her function so she can  enjoy life again    Medical History:   Past Medical History:   Diagnosis Date    Allergy     Anemia     Anxiety     Asthma     Back pain     Chronic bronchitis     Cigarette smoker     DDD (degenerative disc disease), lumbar 2020    Depression     Diabetes with neurologic complications     DUB (dysfunctional uterine bleeding) 10/16/2018    GERD (gastroesophageal reflux disease)     High cholesterol     Hyperprolactinemia     Hypertension     Influenza A 2020    Neuromuscular disorder     Obese body habitus     Obesity     Pseudotumor cerebri     Renal manifestation of secondary diabetes mellitus     Respiratory failure     Seizures     Simple endometrial hyperplasia     Sleep apnea     Smoker     Tobacco dependence     Urinary incontinence        Surgical History:   Yanni Kaiser  has a past surgical history that includes Cholecystectomy; Sinus surgery;  section; Breast cyst aspiration; Hysteroscopy with dilation and curettage of uterus (N/A, 10/16/2018); Colonoscopy; Upper gastrointestinal endoscopy; Esophagogastroduodenoscopy (N/A, 2019); Colonoscopy (N/A, 2019); Retinal laser procedure (Left); Esophagogastroduodenoscopy (N/A, 2019); Esophageal manometry with measurement of impedance (N/A, 2019); Plantar fasciotomy (Left, 2019); Foot fracture surgery (Left); Transforaminal epidural injection of steroid (Bilateral, 2020); Epidural steroid injection (N/A, 2020); Injection of anesthetic agent around nerve (Bilateral, 10/23/2020); Esophagogastroduodenoscopy (N/A, 2020); Esophagogastroduodenoscopy (N/A, 4/15/2021); Functional endoscopic sinus surgery (FESS) using computer-assisted navigation (Bilateral, 10/22/2021); Sphenoidectomy (Bilateral, 10/22/2021); Antrostomy of maxillary sinus at inferior nasal meatus (10/22/2021); Transforaminal epidural injection of steroid (Bilateral, 2022); and Injection of anesthetic  agent around nerve (Bilateral, 2/18/2022).    Medications:   Yanni has a current medication list which includes the following prescription(s): albuterol, albuterol, alcohol swabs, odactra, ammonium lactate, azelastine, blood sugar diagnostic, blood-glucose meter, budesonide-formoterol 160-4.5 mcg, butalbital-acetaminophen-caffeine -40 mg, cetirizine, cyclobenzaprine, diclofenac sodium, epinephrine, estradiol, fluticasone propionate, jardiance, lancets, lancets, levocetirizine, lidocaine, lidocaine hcl 2%, losartan, montelukast, movantik, nicotine, nicotine (polacrilex), ondansetron, [START ON 3/20/2022] oxycodone-acetaminophen, ozempic, pantoprazole, pregabalin, quetiapine, sodium chloride, trokendi xr, valacyclovir, and venlafaxine, and the following Facility-Administered Medications: acetaminophen, albuterol, diphenhydramine, epinephrine, methylprednisolone sodium succinate, ondansetron, sodium chloride 0.9% 500 mL flush bag, and sodium chloride 0.9%.    Allergies:   Review of patient's allergies indicates:   Allergen Reactions    Gabapentin Other (See Comments)     Makes her twitch        Objective   Resting Vitals:    141/86 mmHg  97% O2 & 88bpm    Postural examination:  Seated: slouched head forward shifting side to side   Standing: head forward, increased lumbar lordosis, shift side to side    Range of Motion - Cervical   AROM AROM AROM    Evaluation Reassessment  Reassessment   Flexion 34 ° ° °   Extension 40 ° ° °   Side bending Right 32 ° ° °   Side bending Left 30 ° ° °   Rotation Right 50 ° ° °   Rotation Left 57 ° ° °     Range of Motion - Lumbar         ROM Loss ROM Loss ROM Loss   Flexion major loss     Extension moderate loss     Side bending Right moderate loss     Side bending Left moderate loss     Rotation Right moderate loss     Rotation Left minimal loss       Strength Testing   Evaluation Reassessment Reassessment   Motion Tested         Lower Extremity R L R L R L   Hip Flexion 4-/5  3/5       Hip IR 3+/5 3/5       Hip ER 4-/5 3+/5       Knee Extension 3+/5 3+/5       Knee Flexion 3+/5 3+/5         Functional Testing     Evaluation Reassessment  Reassessment   Timed Up and Go 15.4 sec sec sec   5 Time Sit to Stand w/out UE 23.4 sec sec sec   Single Limb Stance R LE 3.6 sec sec sec   Single Limb Stance L LE 6.5 sec sec sec   2 Minute Walk Test 282 feet feet feet   6 Minute Walk Test 797 feet feet feet   Self Selected Walking Speed 0.66 m/sec m/sec m/sec     GAIT:  Assistive Device used: none  Level of Assistance: independent  Patient displays the following gait deviations:  unsteady gait, decreased step length, decreased base of support, decreased weight shift and antalgic gait.     Minimum standards/norms:    TUG:  < 13.5 seconds/ Males 7.3 sec, Females 8.1 sec  SLS:  26-29 sec  Ages 20-69  Self Selected Walking Speed:  .4-.8m/sec  Limited community ambulator                                                   .8- 1.2  Community ambulator                                                    1.2 m/sec and above  Able to safely cross streets        Limitation/Restriction for FOTO lumbar Survey    Therapist reviewed FOTO scores for Yanni Kaiser on 3/15/2022.   FOTO documents entered into [employer] - see Media section.    Limitation Score: 49%  Predicted Score: 44%         TREATMENT     Total Treatment time (time-based codes) separate from Evaluation: 20 minutes     Yanni received the treatments listed below:      therapeutic activities to improve functional performance for 20 minutes, including:   PT education:  o Physical & psychological viscous pain cycles (see patient instructions 3/15/2022)  o Role of consistent activity (graded exposure) to break the physical cycle  o Importance of education & behavioral changes that promote intrinsic patient motivation to help break the psychological cycle  o Impact on pt's functional goals when breaking each one of these cycles.    o Impact central  sensitization has on the sensory system in the chronic pain population    Diaphragmatic breathing:   · Awareness of pulling breath down away from mouth to hips  · Cues for for proper abdominal excursion & decreased use of accessory muscles of breathing (hand on stomach & on chest; increased stomach motion, decreased chest motion)      PATIENT EDUCATION AND HOME EXERCISES     Education provided:   - pain cycle, diaphragmatic breathing, HEP    Written Home Exercises Provided: yes. Exercises were reviewed and Yanni was able to demonstrate them prior to the end of the session.  Yanni demonstrated good  understanding of the education provided. See EMR under Patient Instructions for information provided during therapy sessions.    ASSESSMENT   Yanni is a 51 y.o. female referred to outpatient Physical Therapy with a medical diagnosis of chronic pain disorder & impaired functional mobility. Pt presents with pain, weakness, impaired mobility, decreased AROM, impaired balance, fall risk, gait deviations, decreased endurance, fear of pain w/ central sensitization, increased psychosocial concerns, & inability to perform ADL's as before due to current functional status. Pt's increased psychosocial concerns w/ central sensitization present an unstable clinical presentation which may limit progress, but the intrinsic motivation to improve will assist. Pt high BMI may also limit her progress, but also very well bolstered w/ her intrinsic motivation to improve.      Based on the above history and physical examination an active physical therapy program within a multi-disciplinary setting is recommended.  Pt will benefit from skilled outpatient physical therapy to address the deficits listed below in the chart, provide pt/family education and to maximize pt's level of independence in the home and community environment.     Plan of care discussed with patient: Yes  Pt's spiritual, cultural and educational needs considered and  patient is agreeable to the plan of care and goals as stated below:     Pt prognosis is Fair.   Anticipated Barriers for therapy: chronic nature of issues, high BMI    Medical Necessity is demonstrated by the following  History  Co-morbidities and personal factors that may impact the plan of care Co-morbidities:   coping style/mechanism, depression, diabetes, difficulty sleeping, financial considerations, high BMI and HTN    Personal Factors:   coping style  social background  lifestyle  character     high   Examination  Body Structures and Functions, activity limitations and participation restrictions that may impact the plan of care Body Regions:   neck  back  lower extremities  upper extremities  trunk    Body Systems:    ROM  strength  balance  gait  transfers  motor control    Participation Restrictions:       Activity limitations:   Learning and applying knowledge  no deficits    General Tasks and Commands  no deficits    Communication  no deficits    Mobility  lifting and carrying objects  walking  using transportation (bus, train, plane, car)  driving (bike, car, motorcycle)    Self care  washing oneself (bathing, drying, washing hands)  caring for body parts (brushing teeth, shaving, grooming)  dressing    Domestic Life  shopping  cooking  doing house work (cleaning house, washing dishes, laundry)    Interactions/Relationships  no deficits    Life Areas  employment    Community and Social Life  community life  recreation and leisure         high   Clinical Presentation unstable clinical presentation with unpredictable characteristics high   Decision Making/ Complexity Score: high       GOALS: Pt is in agreement with the following goals:      Goal Progress Towards Goal   Short Term: In 3 weeks, pt will:    Verbalize & demonstrate good understanding of resisted training with 3-1-3 second rep for uwujrvyrws-nrmyoowop-bwqcyaynm contraction to encourage full muscle recruitment for strength & endurance Progress  towards goal: initiated 3/15/2022   Verbalize & demonstrate good understanding of home stretch routine to improve AROM, help decrease pain & improve mobility Progress towards goal: initiated 3/15/2022   Demonstrate proper supine or seated diaphragmatic breathing with decreased chest excursion & emphasis on full abdominal excursion & increased expiration time to promote muscle relaxation & increased parasympathetic activity to improve patient tolerance to activities Progress towards goal: initiated 3/15/2022   Demonstrate proper static standing posture in frontal & sagittal plane per PT visual assessment to decrease postural strain in ADLs Progress towards goal: initiated 3/15/2022   Demonstrate good recall of names of resisted exercises w/ minimal to moderate verbal cues to promote independence w/ exercise at the end of the program Progress towards goal: initiated 3/15/2022   Verbalize plan for continuing resisted exercises w/ specific location (i.e. commercial gym, home, community fitness center) indicating preference for machine-based or free-weight exercises to incorporate all major muscle groups to maintain & progress therapeutic functional improvements Progress towards goal: initiated 3/15/2022       Long Term: In 8 weeks, pt will:    Verbalize good understanding of activities planning/scheduling (stretching, mindfulness, resisted training, & cardio) prescribed by PT/OT following the end of the program to sustain & progress functional improvements Progress towards goal: initiated 3/15/2022   Be independent w/ selected resisted training w/ minimal to no cuing while performing to continue w/ resisted strengthening independently at the end of the program Progress towards goal: initiated 3/15/2022   Improve 2 Minute Walk Test (MWT) to 400 feet and 6 MWT to 1200 feet or greater to improve gait speed and mobility Progress towards goal: initiated 3/15/2022   Perform single leg stance 10 seconds or greater bilaterally  to improve safety and balance in ADLs Progress towards goal: initiated 3/15/2022   Demonstrate Timed Up & Go (with appropriate assistive device, if necessary) of 10 seconds or less to decrease fall risk and improve functional mobility Progress towards goal: initiated 3/15/2022   Demonstrate 5 time sit to stand test without upper extremity assist to 15 sec or less to improve general mobility and decrease fall risk Progress towards goal: initiated 3/15/2022   Score 44 % or less on Lumbar FOTO to indicate significant functional improvements in impaired functions secondary to low back pain Progress towards goal: initiated 3/15/2022         PLAN   Plan of care Certification: 4/18/2022 to 6/10/2022.    Outpatient Physical Therapy 3 times weekly for 8 weeks to include the following interventions: Cervical/Lumbar Traction, Electrical Stimulation per PT, Gait Training, Manual Therapy, Moist Heat/ Ice, Neuromuscular Re-ed, Patient Education, Therapeutic Activities and Therapeutic Exercise.     Ugo Tim, PT, DPT

## 2022-03-15 NOTE — PLAN OF CARE
"[facility]   Occupational Therapy Initial Evaluation &  Goals and Physical Capacity for Functional Restoration Program      Date: 3/15/2022  Name: Yanni Kaiser  Clinic Number: [id number]    Therapy Diagnosis:   Encounter Diagnoses   Name Primary?    Chronic pain disorder     Decreased activities of daily living (ADL)     Impaired functional mobility and activity tolerance     Alteration in performance of activities of daily living     Fear of pain      Physician: Snow Collazo NP    Physician Orders: OT Eval and Treat  Medical Diagnosis: Chronic pain disorder  Evaluation Date: 3/15/2022  Insurance Authorization Period Expiration: 12/31/2022  Plan of Care Certification Period: 5/27/2022  Visit # / Visits authorized: 1/ 1  FOTO: evaluation: 60% limitation    Precautions:  Standard and Fall    Time In:10:05 AM  Time Out: 11:00 AM    Total Appointment Time (timed & untimed codes): 55 minutes    Subjective   Date of onset: recent exacerbation of pain 2-3 years ago gradual progression, no incident or injury   Patient reported history of current condition - Yanni reports: Currently she reports her pain is mostly in her low back and occasionally down LLE. Received several injections for back pain which she note short term relief from. She was in aquatic therapy, but stopped last month 2/2 recurring yeast infections. She reports that it was helpful while in the pool but reports no improvements in pain or function. She is still caring for 4/7 grandchildren after school. On medication for seizures since 2002 and last seizure was 5 years ago. No longer in smoking cessation program.   Statement: "I am struggling with just about everything"  Prior Level of Function: activities patient can no longer perform/has great difficulty with getting out of bed, getting out of chair, sitting in the car, sleeping, bathing, dressing lower body.     Medical History:   Past Medical History: "   Diagnosis Date    Allergy     Anemia     Anxiety     Asthma     Back pain     Chronic bronchitis     Cigarette smoker     DDD (degenerative disc disease), lumbar 2020    Depression     Diabetes with neurologic complications     DUB (dysfunctional uterine bleeding) 10/16/2018    GERD (gastroesophageal reflux disease)     High cholesterol     Hyperprolactinemia     Hypertension     Influenza A 2020    Neuromuscular disorder     Obese body habitus     Obesity     Pseudotumor cerebri     Renal manifestation of secondary diabetes mellitus     Respiratory failure     Seizures     Simple endometrial hyperplasia     Sleep apnea     Smoker     Tobacco dependence     Urinary incontinence         Surgical History:   Yanni  has a past surgical history that includes Cholecystectomy; Sinus surgery;  section; Breast cyst aspiration; Hysteroscopy with dilation and curettage of uterus (N/A, 10/16/2018); Colonoscopy; Upper gastrointestinal endoscopy; Esophagogastroduodenoscopy (N/A, 2019); Colonoscopy (N/A, 2019); Retinal laser procedure (Left); Esophagogastroduodenoscopy (N/A, 2019); Esophageal manometry with measurement of impedance (N/A, 2019); Plantar fasciotomy (Left, 2019); Foot fracture surgery (Left); Transforaminal epidural injection of steroid (Bilateral, 2020); Epidural steroid injection (N/A, 2020); Injection of anesthetic agent around nerve (Bilateral, 10/23/2020); Esophagogastroduodenoscopy (N/A, 2020); Esophagogastroduodenoscopy (N/A, 4/15/2021); Functional endoscopic sinus surgery (FESS) using computer-assisted navigation (Bilateral, 10/22/2021); Sphenoidectomy (Bilateral, 10/22/2021); Antrostomy of maxillary sinus at inferior nasal meatus (10/22/2021); Transforaminal epidural injection of steroid (Bilateral, 2022); and Injection of anesthetic agent around nerve (Bilateral, 2022).    Medications:   Yanni has a  "current medication list which includes the following prescription(s): albuterol, albuterol, alcohol swabs, odactra, ammonium lactate, azelastine, blood sugar diagnostic, blood-glucose meter, budesonide-formoterol 160-4.5 mcg, butalbital-acetaminophen-caffeine -40 mg, cetirizine, cyclobenzaprine, diclofenac sodium, epinephrine, estradiol, fluticasone propionate, jardiance, lancets, lancets, levocetirizine, lidocaine, lidocaine hcl 2%, losartan, montelukast, movantik, nicotine, nicotine (polacrilex), ondansetron, [START ON 3/20/2022] oxycodone-acetaminophen, ozempic, pantoprazole, pregabalin, quetiapine, sodium chloride, trokendi xr, valacyclovir, and venlafaxine, and the following Facility-Administered Medications: acetaminophen, albuterol, diphenhydramine, epinephrine, methylprednisolone sodium succinate, ondansetron, sodium chloride 0.9% 500 mL flush bag, and sodium chloride 0.9%.    Allergies:   Review of patient's allergies indicates:   Allergen Reactions    Gabapentin Other (See Comments)     Makes her twitch       Prior Therapy: Yes, aquatic. Had to stop 2/2 yeast infections     Falls/Balance: no falls in the last 6 mo, but reports some balance difficulty     Disturbed sleep: yes , reports difficulty falling asleep and staying asleep 2/2 pain. Difficulty finding position of comfort.     Mental health: "its okay I guess. It's okay as far as I know"     Occupational Profile  Social Hx: Other lives with their family (daughter,  and 3 grandchildren)  Home access: single story house, no SEFERINO   Family dynamic: 4 kids, 7 grandchildren all local, very close family   DME: none   Driving status: yes,  brought her to appt today   Occupations/hobbies/homemaking: "I go to [employer], that's my fun and my relaxation"   Working presently: disability since 2009, used to suffer from seizures (last seizure 5 years ago)        Pain:  Pain Related Behaviors Observed: yes   Functional Pain Scale Rating 0-10: within " "the last 30 days  6/10 current  9/10 at worst  5/10 at best  Location: low back, B hips, LLE, B feet (L>R)  Description: achey, stabbing   Functional Exacerbations: sitting for long periods of time, standing, lifting, bending   Easing Factors: pain gel, pain medication, lay down     Personal Functional Three Month Goals:   Vocational: "more active in [employer]"    Recreational : "walking more"   Daily Living Activities: "try cooking and cleaning more"     Social and ADL History:  Activities of Daily Living Checklist:   Key to Score:   0: Unable to do the activity  1: Can do the activity with great difficulty  2: Can do the activity with little difficulty   3: No problem with activity  N/A: This is not an activity I do  H/T: Have not tried yet    Personal Care:  Homemaking:     Dressing Upper Body:  2 Meal preparation: 1   Dressing Lower Body:  1 Kitchen Cleanup: 1   Bathin Childcare:  2   Hair Care:  3 Grocery shoppin   Sleep:  2 Vacuuming/moppin     Emptying trash/ garbage ba   General Mobility:   Bed making changin   Sittin Laundry  1   Bendin     Getting in/out of bed:  1 Home Maintenance:    Standin Mowing, raking:  na   Walkin Gardening:  na   Twistin Home Repairs:  na   Liftin Painting:  na         Getting around:       Getting in and out of car:  1     Buckling up you seat belt:  2     Opening heavy doors:  1     Climbing/descending stairs:  1             Objective     COGNITIVE Exam  Oriented: Person, Place, Time and Situation  Behaviors: Follows multistep  commands  Follows Commands/attention: Follows multistep  commands  Communication: clear/fluent  Memory: No Deficits noted  Safety awareness/insight to disability: good awareness into limitations  Coping skills/emotional control: Appropriate to situation    PHYSICAL Exam  Resting Blood Pressure: 141/86  Heart rate: 88bpm  SpO2: 97%    Active ROM:     UE RUE LUE Comments   Hands WFL WFL    Wrist WFL WFL  "   Elbows WFL WFL    Shoulders WFL WFL        Dominant hand: right    Functional Strengthen Assessment: WFL    Strength Testing   Evaluation [place] FRP  2 Month Follow Up   Motion Tested          R L R L R L   Upper Extremity         Shoulder Flexion 4+/5 4/5       Shoulder Extension 5/5 5/5       Shoulder Abduction 5/5 5/5       Shoulder IR 5/5 5/5       Shoulder ER 5/5 5/5       Elbow Flexion 5/5 5/5       Elbow Extension 5/5 5/5            Evaluation 3/15/22   5 times sit-stand  (adults 18-65 y/o) 23.44 seconds  >12 sec= fall risk for general elderly  >16 sec= fall risk for Parkinson's disease  >10 sec= balance/vestibular dysfunction (<59 y/o)  >14.2 sec= balance/vestibular dysfunction (>59 y/o)  >12 sec= fall risk for CVA     Endurance Testing: Modified Ramp Treadmill Test   GAP 3/15/22 Re-assessment Follow-up   Minutes Completed   3 mins 00 secs     % Grades  10 %     Speed (mph)  1.7 mph     METS 4      bpm     Froylan Rating Perceived Exertion Scale   7     Reason for stop point: Pt reported posture changes, fatigue, noted decline in maintaining pace safely.     Functional Strength Test:  Pile Testing: Lifting  (Pounds/Heart rate)   GAP/Eval (#/HR/FROYLAN)  Reassessment  Day 9   Follow-up   Appointment    Repetitive Floor to Waist      10#/116/6          Repetitive Waist to Shoulder    15#/100/4              1- Time Maximum     15#/102/4           Carry-2 Handed (40ft)     17#/113/4           Work demand Level     Light           Reason for Stop point: Increased time required for completing repetitions. During physical testing, participation level consistent.    No environmental, cultural, spiritual, developmental or education needs expressed or noted.      Limitation/Restriction for FOTO NOC Survey    Therapist reviewed FOTO scores for Yanni Kaiser on 3/15/2022.   FOTO documents entered into [employer] - see Media section.    Limitation Score: 60%         Treatment   Home Exercises and Patient Education  Provided    Education provided:   -Ms. Kaiser received education regarding proper posture and body mechanics. She received education regarding anticipated muscular soreness following lift testing and strategies for management were reviewed with patient including stretching, using ice, scheduled rest.  -Additional Education provided: keke scale, pain cycle, z-lie, functional lifting mechanics, pursed lip and diaphragmatic breathing techniques    Written Home Exercises Provided: yes.  Exercises were reviewed and Yanni was able to demonstrate them prior to the end of the session.    Yanni demonstrated good  understanding of the education provided.     Yanni given handout re: z-lie position, and received education on the Functional Restoration Program. Details of the program were discussed.     Assessment     Yanni appears to be a good candidate for the Functional Restoration Program. She currently exhibits overall deconditioning, decreased mood, fear of pain and exhibits pain avoidance behavior. She has a PMH of DM, chronic bronchitis, GERD, asthma, pseudotumor cerebri (2002), seizures (2002), migraines, sleep apnea (no CPAP). Due to the chronic nature of her issues and various co morbidities, she presents with an unstable clinical presentation which may limit her progress, but with good motivation to make behavioral changes. Yanni with mild affect. She was able to participate in all aspects of assessment. Discussed with Yanni how goal of functional restoration program is to provide tools, education and training aimed at improving physical functioning, emotional health, stress and pain coping skills.The program is designed to build confidence in physical activity and improve independence with ADLs and IADLs and in your ability to control and manage your pain. She voiced understanding.    Yanni is unable to fully participate in community work, recreational, and home-based activities because of  symptoms of depression, decreased functional strength, decreased endurance, limited positional tolerance, fear of re-injury, and fear of increased pain. She will benefit from participating in a conditioning  program designed  to increase functional strength, flexibility, and endurance in order to meet functional goals.     Norbertos prognosis is Good due to good personal goals, good response to all feedback and education and good awareness of limiting behaviors. She will benefit from skilled outpatient Occupational Therapy to address the limitations and goals stated above and in the chart below, provide patient/family education, and to maximize patient's level of functional independence.    Plan of care discussed with patient: Yes  Pt's spiritual, cultural and educational needs considered and patient is agreeable to the plan of care and goals as stated below:     Medical necessity is demonstrated by the following problem list.   Profile and History Assessment of Occupational Performance Level of Clinical Decision Making Complexity Score   Occupational Profile:   Yanni Kaiser is a 51 y.o. female who lives with their family and is currently on disability since 2009. Yanni Kaiser has difficulty with  bathing, grooming and dressing  driving/transportation management, shopping, phone/computer use and housework/household chores  affecting her daily functional abilities. Her main goal for therapy is cooking, walking, and to be able to be more active in [employer].     Comorbidities:   coping style/mechanism, difficulty sleeping, high BMI, level of undertstanding of current condition and SHARATH, migraines, tobacco use    Medical and Therapy History Review:   Expanded               Performance Deficits    Physical:  Joint Stability  Muscle Power/Strength  Muscle Endurance  Proprioception Functions  Pain    Cognitive:  Safety Awareness/Insight to Disability    Psychosocial:   Pain avoidance, social isolation    Social Interaction  Habits  Routines  Group Participation     Clinical Decision Making:  moderate    Assessment Process:  Detailed Assessments    Modification/Need for Assistance:  Minimal-Moderate Modifications/Assistance    Intervention Selection:  Several Treatment Options       high  Based on PMHX, co morbidities , data from assessments and functional level of assistance required with task and clinical presentation directly impacting function.           FRP Goals: While working towards the long-term (2 month) functional goals listed above, Ms. Kaiser will accomplish the following short term goals during the 5-week intensive rehabilitation program. Ms. Kaiser will:     1. Develop a viable return to community participation plan.   2. Demonstrate physical capacities consistent with a medium work demand level.   3. Demonstrate increased functional strength by lifting 20 lbs floor to waist and 20 lbs waist to shoulder in order to meet demand of work/home routine.   4. Increase her positional tolerance to the level needed for work and home activities.   5. Implement self-care strategies during the program and at home to control pain.   6.   Patient will demonstrate use of mindfulness, stress management, and coping  strategies for improved participation in daily routine.     Specific patient expressed goals and demand levels:  Current Capacity Limit/ Physical  Demand Level (PDL)   Demand Levels of Functional Recovery Goals    Performance/Satisfaction  Day 1 Performance/Satisfaction  Day 10 Performance/Satisfaction  Follow-up     Light Physical Demand  (Based above tested results)    Vocational:  Attend /participate in [employer]    Recreational:  Walking     Increased socialization    Daily Living:  Cooking     Cleaning       Medium Physical Demand Level       The PDL listed above is based on today's physical performance screening test. More comprehensive physical  testing integrated with clinical findings would be  required to derived an accurate work capacity.      Plan   Plan of care certification: 4/18/2022 to 5/27/2022.    Outpatient occupational therapy, 3 times a week for 5 weeks:     1. Functional conditioning daily to increase physical capacity and allow Ms. Kaiser to meet demands of vocation and home.   2. Individual counseling to develop return to work/daily routine plan and better understand the return to work process and/or daily routine restructure.   3. Daily Stretching, aerobic conditioning and walking to increase functional tolerance and allow Ms. Kaiser to meet demands of work and home.   4. Functional activities to increase ability to move fluidly and quickly and tolerate unguarded movements.   5. Re-education regarding proper postural, body mechanics, and coping strategies for healthy roles and routine for managing daily stressors.    6. Any other treatment deemed necessary for patient to achieve personally driven goals to promote positive health and wellness and daily roles and routines    Mira Ryder OT, DEMIAN, 3/15/2022  Occupational Therapy

## 2022-03-15 NOTE — PROGRESS NOTES
"[facility]   Occupational Therapy Initial Evaluation &  Goals and Physical Capacity for Functional Restoration Program  NOTE: This is a duplicate copy utilized for reassessment purposes & continuity of care.  See pt's plan of care for co-signed copy.      Date: 3/15/2022  Name: Yanni Kaiser  Clinic Number: [id number]    Therapy Diagnosis:   Encounter Diagnoses   Name Primary?    Chronic pain disorder     Decreased activities of daily living (ADL)     Impaired functional mobility and activity tolerance     Alteration in performance of activities of daily living     Fear of pain      Physician: Snow Collazo NP    Physician Orders: OT Eval and Treat  Medical Diagnosis: Chronic pain disorder  Evaluation Date: 3/15/2022  Insurance Authorization Period Expiration: 12/31/2022  Plan of Care Certification Period: 5/27/2022  Visit # / Visits authorized: 1/ 1  FOTO: evaluation: 60% limitation    Precautions:  Standard and Fall    Time In:10:05 AM  Time Out: 11:00 AM    Total Appointment Time (timed & untimed codes): 55 minutes    Subjective   Date of onset: recent exacerbation of pain 2-3 years ago gradual progression, no incident or injury   Patient reported history of current condition - Yanni reports: Currently she reports her pain is mostly in her low back and occasionally down LLE. Received several injections for back pain which she note short term relief from. She was in aquatic therapy, but stopped last month 2/2 recurring yeast infections. She reports that it was helpful while in the pool but reports no improvements in pain or function. She is still caring for 4/7 grandchildren after school. On medication for seizures since 2002 and last seizure was 5 years ago. No longer in smoking cessation program.   Statement: "I am struggling with just about everything"  Prior Level of Function: activities patient can no longer perform/has great difficulty with getting out of bed, " getting out of chair, sitting in the car, sleeping, bathing, dressing lower body.     Medical History:   Past Medical History:   Diagnosis Date    Allergy     Anemia     Anxiety     Asthma     Back pain     Chronic bronchitis     Cigarette smoker     DDD (degenerative disc disease), lumbar 2020    Depression     Diabetes with neurologic complications     DUB (dysfunctional uterine bleeding) 10/16/2018    GERD (gastroesophageal reflux disease)     High cholesterol     Hyperprolactinemia     Hypertension     Influenza A 2020    Neuromuscular disorder     Obese body habitus     Obesity     Pseudotumor cerebri     Renal manifestation of secondary diabetes mellitus     Respiratory failure     Seizures     Simple endometrial hyperplasia     Sleep apnea     Smoker     Tobacco dependence     Urinary incontinence         Surgical History:   Yanni  has a past surgical history that includes Cholecystectomy; Sinus surgery;  section; Breast cyst aspiration; Hysteroscopy with dilation and curettage of uterus (N/A, 10/16/2018); Colonoscopy; Upper gastrointestinal endoscopy; Esophagogastroduodenoscopy (N/A, 2019); Colonoscopy (N/A, 2019); Retinal laser procedure (Left); Esophagogastroduodenoscopy (N/A, 2019); Esophageal manometry with measurement of impedance (N/A, 2019); Plantar fasciotomy (Left, 2019); Foot fracture surgery (Left); Transforaminal epidural injection of steroid (Bilateral, 2020); Epidural steroid injection (N/A, 2020); Injection of anesthetic agent around nerve (Bilateral, 10/23/2020); Esophagogastroduodenoscopy (N/A, 2020); Esophagogastroduodenoscopy (N/A, 4/15/2021); Functional endoscopic sinus surgery (FESS) using computer-assisted navigation (Bilateral, 10/22/2021); Sphenoidectomy (Bilateral, 10/22/2021); Antrostomy of maxillary sinus at inferior nasal meatus (10/22/2021); Transforaminal epidural injection of steroid  "(Bilateral, 1/28/2022); and Injection of anesthetic agent around nerve (Bilateral, 2/18/2022).    Medications:   Yanni has a current medication list which includes the following prescription(s): albuterol, albuterol, alcohol swabs, odactra, ammonium lactate, azelastine, blood sugar diagnostic, blood-glucose meter, budesonide-formoterol 160-4.5 mcg, butalbital-acetaminophen-caffeine -40 mg, cetirizine, cyclobenzaprine, diclofenac sodium, epinephrine, estradiol, fluticasone propionate, jardiance, lancets, lancets, levocetirizine, lidocaine, lidocaine hcl 2%, losartan, montelukast, movantik, nicotine, nicotine (polacrilex), ondansetron, [START ON 3/20/2022] oxycodone-acetaminophen, ozempic, pantoprazole, pregabalin, quetiapine, sodium chloride, trokendi xr, valacyclovir, and venlafaxine, and the following Facility-Administered Medications: acetaminophen, albuterol, diphenhydramine, epinephrine, methylprednisolone sodium succinate, ondansetron, sodium chloride 0.9% 500 mL flush bag, and sodium chloride 0.9%.    Allergies:   Review of patient's allergies indicates:   Allergen Reactions    Gabapentin Other (See Comments)     Makes her twitch       Prior Therapy: Yes, aquatic. Had to stop 2/2 yeast infections     Falls/Balance: no falls in the last 6 mo, but reports some balance difficulty     Disturbed sleep: yes , reports difficulty falling asleep and staying asleep 2/2 pain. Difficulty finding position of comfort.     Mental health: "its okay I guess. It's okay as far as I know"     Occupational Profile  Social Hx: Other lives with their family (daughter,  and 3 grandchildren)  Home access: single story house, no SEFERINO   Family dynamic: 4 kids, 7 grandchildren all local, very close family   DME: none   Driving status: yes,  brought her to appt today   Occupations/hobbies/homemaking: "I go to [employer], that's my fun and my relaxation"   Working presently: disability since 2009, used to suffer from " "seizures (last seizure 5 years ago)        Pain:  Pain Related Behaviors Observed: yes   Functional Pain Scale Rating 0-10: within the last 30 days  6/10 current  9/10 at worst  5/10 at best  Location: low back, B hips, LLE, B feet (L>R)  Description: achey, stabbing   Functional Exacerbations: sitting for long periods of time, standing, lifting, bending   Easing Factors: pain gel, pain medication, lay down     Personal Functional Three Month Goals:   Vocational: "more active in [employer]"    Recreational : "walking more"   Daily Living Activities: "try cooking and cleaning more"     Social and ADL History:  Activities of Daily Living Checklist:   Key to Score:   0: Unable to do the activity  1: Can do the activity with great difficulty  2: Can do the activity with little difficulty   3: No problem with activity  N/A: This is not an activity I do  H/T: Have not tried yet    Personal Care:  Homemaking:     Dressing Upper Body:  2 Meal preparation: 1   Dressing Lower Body:  1 Kitchen Cleanup: 1   Bathin Childcare:  2   Hair Care:  3 Grocery shoppin   Sleep:  2 Vacuuming/moppin     Emptying trash/ garbage ba   General Mobility:   Bed making changin   Sittin Laundry  1   Bendin     Getting in/out of bed:  1 Home Maintenance:    Standin Mowing, raking:  na   Walkin Gardening:  na   Twistin Home Repairs:  na   Liftin Painting:  na         Getting around:       Getting in and out of car:  1     Buckling up you seat belt:  2     Opening heavy doors:  1     Climbing/descending stairs:  1             Objective     COGNITIVE Exam  Oriented: Person, Place, Time and Situation  Behaviors: Follows multistep  commands  Follows Commands/attention: Follows multistep  commands  Communication: clear/fluent  Memory: No Deficits noted  Safety awareness/insight to disability: good awareness into limitations  Coping skills/emotional control: Appropriate to situation    PHYSICAL " Exam  Resting Blood Pressure: 141/86  Heart rate: 88bpm  SpO2: 97%    Active ROM:     UE RUE LUE Comments   Hands WFL WFL    Wrist WFL WFL    Elbows WFL WFL    Shoulders WFL WFL        Dominant hand: right    Functional Strengthen Assessment: WFL    Strength Testing   Evaluation [place] FR[facility]  2 Month Follow Up   Motion Tested          R L R L R L   Upper Extremity         Shoulder Flexion 4+/5 4/5       Shoulder Extension 5/5 5/5       Shoulder Abduction 5/5 5/5       Shoulder IR 5/5 5/5       Shoulder ER 5/5 5/5       Elbow Flexion 5/5 5/5       Elbow Extension 5/5 5/5            Evaluation 3/15/22   5 times sit-stand  (adults 18-65 y/o) 23.44 seconds  >12 sec= fall risk for general elderly  >16 sec= fall risk for Parkinson's disease  >10 sec= balance/vestibular dysfunction (<59 y/o)  >14.2 sec= balance/vestibular dysfunction (>59 y/o)  >12 sec= fall risk for CVA     Endurance Testing: Modified Ramp Treadmill Test   GAP 3/15/22 Re-assessment Follow-up   Minutes Completed   3 mins 00 secs     % Grades  10 %     Speed (mph)  1.7 mph     METS 4      bpm     Froylan Rating Perceived Exertion Scale   7     Reason for stop point: Pt reported posture changes, fatigue, noted decline in maintaining pace safely.     Functional Strength Test:  Pile Testing: Lifting  (Pounds/Heart rate)   GAP/Eval (#/HR/FROYLAN)  Reassessment  Day 9   Follow-up   Appointment    Repetitive Floor to Waist      10#/116/6          Repetitive Waist to Shoulder    15#/100/4              1- Time Maximum     15#/102/4           Carry-2 Handed (40ft)     17#/113/4           Work demand Level     Light           Reason for Stop point: Increased time required for completing repetitions. During physical testing, participation level consistent.    No environmental, cultural, spiritual, developmental or education needs expressed or noted.      Limitation/Restriction for FOTO NOC Survey    Therapist reviewed FOTO scores for Yanni Kaiser on 3/15/2022.    FOTO documents entered into [employer] - see Media section.    Limitation Score: 60%         Treatment   Home Exercises and Patient Education Provided    Education provided:   -Ms. Kaiser received education regarding proper posture and body mechanics. She received education regarding anticipated muscular soreness following lift testing and strategies for management were reviewed with patient including stretching, using ice, scheduled rest.  -Additional Education provided: keke scale, pain cycle, z-lie, functional lifting mechanics, pursed lip and diaphragmatic breathing techniques    Written Home Exercises Provided: yes.  Exercises were reviewed and Yanni was able to demonstrate them prior to the end of the session.    Yanni demonstrated good  understanding of the education provided.     Yanni given handout re: z-lie position, and received education on the Functional Restoration Program. Details of the program were discussed.     Assessment     Yanni appears to be a good candidate for the Functional Restoration Program. She currently exhibits overall deconditioning, decreased mood, fear of pain and exhibits pain avoidance behavior. She has a PMH of DM, chronic bronchitis, GERD, asthma, pseudotumor cerebri (2002), seizures (2002), migraines, sleep apnea (no CPAP). Due to the chronic nature of her issues and various co morbidities, she presents with an unstable clinical presentation which may limit her progress, but with good motivation to make behavioral changes. Yanni with mild affect. She was able to participate in all aspects of assessment. Discussed with Yanni how goal of functional restoration program is to provide tools, education and training aimed at improving physical functioning, emotional health, stress and pain coping skills.The program is designed to build confidence in physical activity and improve independence with ADLs and IADLs and in your ability to control and manage your pain. She  voiced understanding.    Yanni is unable to fully participate in community work, recreational, and home-based activities because of symptoms of depression, decreased functional strength, decreased endurance, limited positional tolerance, fear of re-injury, and fear of increased pain. She will benefit from participating in a conditioning  program designed  to increase functional strength, flexibility, and endurance in order to meet functional goals.     Yanni's prognosis is Good due to good personal goals, good response to all feedback and education and good awareness of limiting behaviors. She will benefit from skilled outpatient Occupational Therapy to address the limitations and goals stated above and in the chart below, provide patient/family education, and to maximize patient's level of functional independence.    Plan of care discussed with patient: Yes  Pt's spiritual, cultural and educational needs considered and patient is agreeable to the plan of care and goals as stated below:     Medical necessity is demonstrated by the following problem list.   Profile and History Assessment of Occupational Performance Level of Clinical Decision Making Complexity Score   Occupational Profile:   Yanni Kaiser is a 51 y.o. female who lives with their family and is currently on disability since 2009. Yanni Kaiser has difficulty with  bathing, grooming and dressing  driving/transportation management, shopping, phone/computer use and housework/household chores  affecting her daily functional abilities. Her main goal for therapy is cooking, walking, and to be able to be more active in [employer].     Comorbidities:   coping style/mechanism, difficulty sleeping, high BMI, level of undertstanding of current condition and SHARATH, migraines, tobacco use    Medical and Therapy History Review:   Expanded               Performance Deficits    Physical:  Joint Stability  Muscle Power/Strength  Muscle  Endurance  Proprioception Functions  Pain    Cognitive:  Safety Awareness/Insight to Disability    Psychosocial:   Pain avoidance, social isolation   Social Interaction  Habits  Routines  Group Participation     Clinical Decision Making:  moderate    Assessment Process:  Detailed Assessments    Modification/Need for Assistance:  Minimal-Moderate Modifications/Assistance    Intervention Selection:  Several Treatment Options       high  Based on PMHX, co morbidities , data from assessments and functional level of assistance required with task and clinical presentation directly impacting function.           FRP Goals: While working towards the long-term (2 month) functional goals listed above, Ms. Kaiser will accomplish the following short term goals during the 5-week intensive rehabilitation program. Ms. Kaiser will:     1. Develop a viable return to community participation plan.   2. Demonstrate physical capacities consistent with a medium work demand level.   3. Demonstrate increased functional strength by lifting 20 lbs floor to waist and 20 lbs waist to shoulder in order to meet demand of work/home routine.   4. Increase her positional tolerance to the level needed for work and home activities.   5. Implement self-care strategies during the program and at home to control pain.   6.   Patient will demonstrate use of mindfulness, stress management, and coping  strategies for improved participation in daily routine.     Specific patient expressed goals and demand levels:  Current Capacity Limit/ Physical  Demand Level (PDL)   Demand Levels of Functional Recovery Goals    Performance/Satisfaction  Day 1 Performance/Satisfaction  Day 10 Performance/Satisfaction  Follow-up     Light Physical Demand  (Based above tested results)    Vocational:  Attend /participate in [employer]    Recreational:  Walking     Increased socialization    Daily Living:  Cooking     Cleaning       Medium Physical Demand Level       The PDL listed  above is based on today's physical performance screening test. More comprehensive physical  testing integrated with clinical findings would be required to derived an accurate work capacity.      Plan   Plan of care certification: 4/18/2022 to 5/27/2022.    Outpatient occupational therapy, 3 times a week for 5 weeks:     1. Functional conditioning daily to increase physical capacity and allow Ms. Kaiser to meet demands of vocation and home.   2. Individual counseling to develop return to work/daily routine plan and better understand the return to work process and/or daily routine restructure.   3. Daily Stretching, aerobic conditioning and walking to increase functional tolerance and allow Ms. Kaiser to meet demands of work and home.   4. Functional activities to increase ability to move fluidly and quickly and tolerate unguarded movements.   5. Re-education regarding proper postural, body mechanics, and coping strategies for healthy roles and routine for managing daily stressors.    6. Any other treatment deemed necessary for patient to achieve personally driven goals to promote positive health and wellness and daily roles and routines    Mira Ryder, OT, DEMINA, 3/15/2022  Occupational Therapy

## 2022-03-15 NOTE — PLAN OF CARE
"NOTE: This is a duplicate copy utilized for reassessment purposes & continuity of care.  See pt's plan of care for co-signed copy.    [facility]  Functional Restoration Program  Physical Therapy Initial Evaluation    Date: 3/15/2022   Name: Yanni Kaiser  [facility] Number: [id number]    Therapy Diagnosis:   Encounter Diagnoses   Name Primary?    Chronic pain disorder     Decreased activities of daily living (ADL)     Impaired functional mobility and activity tolerance     Impaired functional mobility, balance, gait, and endurance Yes    Chronic left-sided low back pain with left-sided sciatica     Recurrent major depressive disorder, in full remission      Physician: Snow Collazo, NP    Physician Orders: PT Eval and Treat   Medical Diagnosis from Referral:   G89.4 (ICD-10-CM) - Chronic pain disorder   Z78.9 (ICD-10-CM) - Decreased activities of daily living (ADL)   Z74.09 (ICD-10-CM) - Impaired functional mobility and activity tolerance       Evaluation Date: 3/15/2022  Authorization Period Expiration: 12/31/2022  Plan of Care Expiration: 6/10/2022  Visit # / Visits authorized: 1/ 1  FOTO: 1/ 3     Precautions: Standard, Diabetes and Fall    Time In: 11:00a  Time Out: 12:00p  Total Appointment Time (timed & untimed codes): 60 minutes      Subjective     Date of onset: 2-3 years ago    Patient reported history of current condition - Yanni reports: "It has been pain for years." While she has had pain for a very long time, her pain started to gradually getting worse about 2-3 years ago more greatly affecting her function. The pain is across her lower back & down the L LE, & surgery was not recommended. She has had several injections & some of them have helped for about 2-3 days at a time. She was trying aquatic therapy until last month & had to stop because she was getting yeast infections from the water, but she thinks it was helpful. The hardest part was getting out of " the pool & she enjoyed the weightlessness of the pool. She feels like she is struggling w/ just about everything, but especially getting out of the bed, getting out of the chair/car, bathing & getting dressed. She takes care of 4 of her 7 grandchildren after they get out of school as her kids are still working, which limits her ability to be in the program. She has trouble falling asleep & even more trouble staying asleep because of her pain. She used to have grandmaul siezures, but has not had any for about 5 years as she feels her medications are working.    Imaging, MRI studies: Impression:     Degenerative changes of the lumbar spine resulting in moderate spinal canal stenosis at L4-L5.  Multilevel neural foraminal narrowing, as above.  Overall appearance is similar to prior exam.     Electronically signed by resident: Nanette Guillory MD  Date:                                            11/17/2021  Time:                                           13:09     Electronically signed by: Chrystal Vo MD  Date:                                            11/17/2021  Time:                                           16:06    Prior Therapy: land PT, aquatics, injections medications  Social History: single story home w/ 0 SEFERINO lives with their family (, daughter & 3 grandkids)  Occupation: disabled since 2009   Prior Level of Function: able to work, could walk greater than 15 minutes, cooking w/out rest breaks  Current Level of Function: unable to lift pots, walking >5 minutes very difficult, unable to cook or clean w/out multiple rest breaks, difficulty dressing/bathing    Pain:      Current 6/10, worst 9/10, best 5/10   Location: low back, B hips, L LE & B feet (L worse)  Description: aching & stabbing  Aggravating Factors: sitting still, standing for too long, lifting (tries not to lift anything), bending  Easing Factors: pain gel, pain pill, lying down    Patient's FRP goals: improve her function so she can  enjoy life again    Medical History:   Past Medical History:   Diagnosis Date    Allergy     Anemia     Anxiety     Asthma     Back pain     Chronic bronchitis     Cigarette smoker     DDD (degenerative disc disease), lumbar 2020    Depression     Diabetes with neurologic complications     DUB (dysfunctional uterine bleeding) 10/16/2018    GERD (gastroesophageal reflux disease)     High cholesterol     Hyperprolactinemia     Hypertension     Influenza A 2020    Neuromuscular disorder     Obese body habitus     Obesity     Pseudotumor cerebri     Renal manifestation of secondary diabetes mellitus     Respiratory failure     Seizures     Simple endometrial hyperplasia     Sleep apnea     Smoker     Tobacco dependence     Urinary incontinence        Surgical History:   Yanni Kaiser  has a past surgical history that includes Cholecystectomy; Sinus surgery;  section; Breast cyst aspiration; Hysteroscopy with dilation and curettage of uterus (N/A, 10/16/2018); Colonoscopy; Upper gastrointestinal endoscopy; Esophagogastroduodenoscopy (N/A, 2019); Colonoscopy (N/A, 2019); Retinal laser procedure (Left); Esophagogastroduodenoscopy (N/A, 2019); Esophageal manometry with measurement of impedance (N/A, 2019); Plantar fasciotomy (Left, 2019); Foot fracture surgery (Left); Transforaminal epidural injection of steroid (Bilateral, 2020); Epidural steroid injection (N/A, 2020); Injection of anesthetic agent around nerve (Bilateral, 10/23/2020); Esophagogastroduodenoscopy (N/A, 2020); Esophagogastroduodenoscopy (N/A, 4/15/2021); Functional endoscopic sinus surgery (FESS) using computer-assisted navigation (Bilateral, 10/22/2021); Sphenoidectomy (Bilateral, 10/22/2021); Antrostomy of maxillary sinus at inferior nasal meatus (10/22/2021); Transforaminal epidural injection of steroid (Bilateral, 2022); and Injection of anesthetic  agent around nerve (Bilateral, 2/18/2022).    Medications:   Yanni has a current medication list which includes the following prescription(s): albuterol, albuterol, alcohol swabs, odactra, ammonium lactate, azelastine, blood sugar diagnostic, blood-glucose meter, budesonide-formoterol 160-4.5 mcg, butalbital-acetaminophen-caffeine -40 mg, cetirizine, cyclobenzaprine, diclofenac sodium, epinephrine, estradiol, fluticasone propionate, jardiance, lancets, lancets, levocetirizine, lidocaine, lidocaine hcl 2%, losartan, montelukast, movantik, nicotine, nicotine (polacrilex), ondansetron, [START ON 3/20/2022] oxycodone-acetaminophen, ozempic, pantoprazole, pregabalin, quetiapine, sodium chloride, trokendi xr, valacyclovir, and venlafaxine, and the following Facility-Administered Medications: acetaminophen, albuterol, diphenhydramine, epinephrine, methylprednisolone sodium succinate, ondansetron, sodium chloride 0.9% 500 mL flush bag, and sodium chloride 0.9%.    Allergies:   Review of patient's allergies indicates:   Allergen Reactions    Gabapentin Other (See Comments)     Makes her twitch        Objective   Resting Vitals:    141/86 mmHg  97% O2 & 88bpm    Postural examination:  Seated: slouched head forward shifting side to side   Standing: head forward, increased lumbar lordosis, shift side to side    Range of Motion - Cervical   AROM AROM AROM    Evaluation Reassessment  Reassessment   Flexion 34 ° ° °   Extension 40 ° ° °   Side bending Right 32 ° ° °   Side bending Left 30 ° ° °   Rotation Right 50 ° ° °   Rotation Left 57 ° ° °     Range of Motion - Lumbar         ROM Loss ROM Loss ROM Loss   Flexion major loss     Extension moderate loss     Side bending Right moderate loss     Side bending Left moderate loss     Rotation Right moderate loss     Rotation Left minimal loss       Strength Testing   Evaluation Reassessment Reassessment   Motion Tested         Lower Extremity R L R L R L   Hip Flexion 4-/5  3/5       Hip IR 3+/5 3/5       Hip ER 4-/5 3+/5       Knee Extension 3+/5 3+/5       Knee Flexion 3+/5 3+/5         Functional Testing     Evaluation Reassessment  Reassessment   Timed Up and Go 15.4 sec sec sec   5 Time Sit to Stand w/out UE 23.4 sec sec sec   Single Limb Stance R LE 3.6 sec sec sec   Single Limb Stance L LE 6.5 sec sec sec   2 Minute Walk Test 282 feet feet feet   6 Minute Walk Test 797 feet feet feet   Self Selected Walking Speed 0.66 m/sec m/sec m/sec     GAIT:  Assistive Device used: none  Level of Assistance: independent  Patient displays the following gait deviations:  unsteady gait, decreased step length, decreased base of support, decreased weight shift and antalgic gait.     Minimum standards/norms:    TUG:  < 13.5 seconds/ Males 7.3 sec, Females 8.1 sec  SLS:  26-29 sec  Ages 20-69  Self Selected Walking Speed:  .4-.8m/sec  Limited community ambulator                                                   .8- 1.2  Community ambulator                                                    1.2 m/sec and above  Able to safely cross streets        Limitation/Restriction for FOTO lumbar Survey    Therapist reviewed FOTO scores for Yanni Kaiser on 3/15/2022.   FOTO documents entered into [employer] - see Media section.    Limitation Score: 49%  Predicted Score: 44%         TREATMENT     Total Treatment time (time-based codes) separate from Evaluation: 20 minutes     Yanni received the treatments listed below:      therapeutic activities to improve functional performance for 20 minutes, including:   PT education:  o Physical & psychological viscous pain cycles (see patient instructions 3/15/2022)  o Role of consistent activity (graded exposure) to break the physical cycle  o Importance of education & behavioral changes that promote intrinsic patient motivation to help break the psychological cycle  o Impact on pt's functional goals when breaking each one of these cycles.    o Impact central  sensitization has on the sensory system in the chronic pain population    Diaphragmatic breathing:   · Awareness of pulling breath down away from mouth to hips  · Cues for for proper abdominal excursion & decreased use of accessory muscles of breathing (hand on stomach & on chest; increased stomach motion, decreased chest motion)      PATIENT EDUCATION AND HOME EXERCISES     Education provided:   - pain cycle, diaphragmatic breathing, HEP    Written Home Exercises Provided: yes. Exercises were reviewed and Yanni was able to demonstrate them prior to the end of the session.  Yanni demonstrated good  understanding of the education provided. See EMR under Patient Instructions for information provided during therapy sessions.    ASSESSMENT   Yanni is a 51 y.o. female referred to outpatient Physical Therapy with a medical diagnosis of chronic pain disorder & impaired functional mobility. Pt presents with pain, weakness, impaired mobility, decreased AROM, impaired balance, fall risk, gait deviations, decreased endurance, fear of pain w/ central sensitization, increased psychosocial concerns, & inability to perform ADL's as before due to current functional status. Pt's increased psychosocial concerns w/ central sensitization present an unstable clinical presentation which may limit progress, but the intrinsic motivation to improve will assist. Pt high BMI may also limit her progress, but also very well bolstered w/ her intrinsic motivation to improve.      Based on the above history and physical examination an active physical therapy program within a multi-disciplinary setting is recommended.  Pt will benefit from skilled outpatient physical therapy to address the deficits listed below in the chart, provide pt/family education and to maximize pt's level of independence in the home and community environment.     Plan of care discussed with patient: Yes  Pt's spiritual, cultural and educational needs considered and  patient is agreeable to the plan of care and goals as stated below:     Pt prognosis is Fair.   Anticipated Barriers for therapy: chronic nature of issues, high BMI    Medical Necessity is demonstrated by the following  History  Co-morbidities and personal factors that may impact the plan of care Co-morbidities:   coping style/mechanism, depression, diabetes, difficulty sleeping, financial considerations, high BMI and HTN    Personal Factors:   coping style  social background  lifestyle  character     high   Examination  Body Structures and Functions, activity limitations and participation restrictions that may impact the plan of care Body Regions:   neck  back  lower extremities  upper extremities  trunk    Body Systems:    ROM  strength  balance  gait  transfers  motor control    Participation Restrictions:       Activity limitations:   Learning and applying knowledge  no deficits    General Tasks and Commands  no deficits    Communication  no deficits    Mobility  lifting and carrying objects  walking  using transportation (bus, train, plane, car)  driving (bike, car, motorcycle)    Self care  washing oneself (bathing, drying, washing hands)  caring for body parts (brushing teeth, shaving, grooming)  dressing    Domestic Life  shopping  cooking  doing house work (cleaning house, washing dishes, laundry)    Interactions/Relationships  no deficits    Life Areas  employment    Community and Social Life  community life  recreation and leisure         high   Clinical Presentation unstable clinical presentation with unpredictable characteristics high   Decision Making/ Complexity Score: high       GOALS: Pt is in agreement with the following goals:      Goal Progress Towards Goal   Short Term: In 3 weeks, pt will:    Verbalize & demonstrate good understanding of resisted training with 3-1-3 second rep for tlbhmpzuiy-cqorcfxok-csrjvufez contraction to encourage full muscle recruitment for strength & endurance Progress  towards goal: initiated 3/15/2022   Verbalize & demonstrate good understanding of home stretch routine to improve AROM, help decrease pain & improve mobility Progress towards goal: initiated 3/15/2022   Demonstrate proper supine or seated diaphragmatic breathing with decreased chest excursion & emphasis on full abdominal excursion & increased expiration time to promote muscle relaxation & increased parasympathetic activity to improve patient tolerance to activities Progress towards goal: initiated 3/15/2022   Demonstrate proper static standing posture in frontal & sagittal plane per PT visual assessment to decrease postural strain in ADLs Progress towards goal: initiated 3/15/2022   Demonstrate good recall of names of resisted exercises w/ minimal to moderate verbal cues to promote independence w/ exercise at the end of the program Progress towards goal: initiated 3/15/2022   Verbalize plan for continuing resisted exercises w/ specific location (i.e. commercial gym, home, community fitness center) indicating preference for machine-based or free-weight exercises to incorporate all major muscle groups to maintain & progress therapeutic functional improvements Progress towards goal: initiated 3/15/2022       Long Term: In 8 weeks, pt will:    Verbalize good understanding of activities planning/scheduling (stretching, mindfulness, resisted training, & cardio) prescribed by PT/OT following the end of the program to sustain & progress functional improvements Progress towards goal: initiated 3/15/2022   Be independent w/ selected resisted training w/ minimal to no cuing while performing to continue w/ resisted strengthening independently at the end of the program Progress towards goal: initiated 3/15/2022   Improve 2 Minute Walk Test (MWT) to 400 feet and 6 MWT to 1200 feet or greater to improve gait speed and mobility Progress towards goal: initiated 3/15/2022   Perform single leg stance 10 seconds or greater bilaterally  to improve safety and balance in ADLs Progress towards goal: initiated 3/15/2022   Demonstrate Timed Up & Go (with appropriate assistive device, if necessary) of 10 seconds or less to decrease fall risk and improve functional mobility Progress towards goal: initiated 3/15/2022   Demonstrate 5 time sit to stand test without upper extremity assist to 15 sec or less to improve general mobility and decrease fall risk Progress towards goal: initiated 3/15/2022   Score 44 % or less on Lumbar FOTO to indicate significant functional improvements in impaired functions secondary to low back pain Progress towards goal: initiated 3/15/2022         PLAN   Plan of care Certification: 4/18/2022 to 6/10/2022.    Outpatient Physical Therapy 3 times weekly for 8 weeks to include the following interventions: Cervical/Lumbar Traction, Electrical Stimulation per PT, Gait Training, Manual Therapy, Moist Heat/ Ice, Neuromuscular Re-ed, Patient Education, Therapeutic Activities and Therapeutic Exercise.     Ugo Tim, PT, DPT

## 2022-03-16 DIAGNOSIS — E11.9 TYPE 2 DIABETES MELLITUS WITHOUT COMPLICATION: ICD-10-CM

## 2022-03-17 ENCOUNTER — DOCUMENTATION ONLY (OUTPATIENT)
Dept: REHABILITATION | Facility: HOSPITAL | Age: 52
End: 2022-03-17
Payer: MEDICARE

## 2022-03-17 NOTE — PROGRESS NOTES
[facility]   Discharge Note    Name: Yanni Kaiser  [facility] Number: [id number]    Physician: Will Prabhakar MD     Physician Orders: PT Eval and Treat   Medical Diagnosis from Referral:   E66.01,Z68.44 (ICD-10-CM) - Morbid obesity with BMI of 60.0-69.9, adult   M54.6 (ICD-10-CM) - Acute bilateral thoracic back pain   M54.17 (ICD-10-CM) - Lumbosacral radiculopathy at L4      Evaluation Date: 1/4/2022  Visit # / Visits authorized: 13/20 + Eval    Date of Last visit: 2/23/22  Total Visits Received: 14    ASSESSMENT    The patient attended 14 Aquatic visits where she was progressing well and tolerated treatment progressions but was getting yeast infections so requested to transition to land therapy.     Discharge reason: Patient requested discharge due to getting yeast infections from the pool.    Goals:     1.Report decreased low back pain  < / =  3/10  to increase tolerance for exercise--Not assessed  2. Increase ROM by 15 degrees where limited in order to perform ADLs without difficulty.--not assessed  3. Increase strength by 1/3 MMT grade in gluteal strength  to increase tolerance for ADL and work activities.--not assessed  4. Pt to tolerate HEP to improve ROM and independence with ADL's--MET      PLAN   This patient is discharged from Physical Therapy      Arit Rodriguez, PT

## 2022-03-23 ENCOUNTER — TELEPHONE (OUTPATIENT)
Dept: SMOKING CESSATION | Facility: CLINIC | Age: 52
End: 2022-03-23
Payer: MEDICARE

## 2022-03-23 ENCOUNTER — PATIENT OUTREACH (OUTPATIENT)
Dept: ADMINISTRATIVE | Facility: OTHER | Age: 52
End: 2022-03-23
Payer: MEDICARE

## 2022-03-25 ENCOUNTER — OFFICE VISIT (OUTPATIENT)
Dept: NEUROSURGERY | Facility: CLINIC | Age: 52
End: 2022-03-25
Payer: MEDICARE

## 2022-03-25 VITALS — HEIGHT: 64 IN | WEIGHT: 293 LBS | BODY MASS INDEX: 50.02 KG/M2

## 2022-03-25 DIAGNOSIS — M51.36 DDD (DEGENERATIVE DISC DISEASE), LUMBAR: ICD-10-CM

## 2022-03-25 DIAGNOSIS — G89.29 CHRONIC BILATERAL LOW BACK PAIN WITH BILATERAL SCIATICA: Primary | ICD-10-CM

## 2022-03-25 DIAGNOSIS — M47.26 OTHER SPONDYLOSIS WITH RADICULOPATHY, LUMBAR REGION: ICD-10-CM

## 2022-03-25 DIAGNOSIS — M54.41 CHRONIC BILATERAL LOW BACK PAIN WITH BILATERAL SCIATICA: Primary | ICD-10-CM

## 2022-03-25 DIAGNOSIS — M48.062 SPINAL STENOSIS, LUMBAR REGION WITH NEUROGENIC CLAUDICATION: ICD-10-CM

## 2022-03-25 DIAGNOSIS — M54.42 CHRONIC BILATERAL LOW BACK PAIN WITH BILATERAL SCIATICA: Primary | ICD-10-CM

## 2022-03-25 DIAGNOSIS — M54.16 LUMBAR RADICULOPATHY: ICD-10-CM

## 2022-03-25 DIAGNOSIS — M43.16 SPONDYLOLISTHESIS, LUMBAR REGION: ICD-10-CM

## 2022-03-25 PROCEDURE — 4010F ACE/ARB THERAPY RXD/TAKEN: CPT | Mod: CPTII,S$GLB,, | Performed by: PHYSICIAN ASSISTANT

## 2022-03-25 PROCEDURE — 1159F MED LIST DOCD IN RCRD: CPT | Mod: CPTII,S$GLB,, | Performed by: PHYSICIAN ASSISTANT

## 2022-03-25 PROCEDURE — 1160F RVW MEDS BY RX/DR IN RCRD: CPT | Mod: CPTII,S$GLB,, | Performed by: PHYSICIAN ASSISTANT

## 2022-03-25 PROCEDURE — 99214 OFFICE O/P EST MOD 30 MIN: CPT | Mod: S$GLB,,, | Performed by: PHYSICIAN ASSISTANT

## 2022-03-25 PROCEDURE — 99214 PR OFFICE/OUTPT VISIT, EST, LEVL IV, 30-39 MIN: ICD-10-PCS | Mod: S$GLB,,, | Performed by: PHYSICIAN ASSISTANT

## 2022-03-25 PROCEDURE — 3008F PR BODY MASS INDEX (BMI) DOCUMENTED: ICD-10-PCS | Mod: CPTII,S$GLB,, | Performed by: PHYSICIAN ASSISTANT

## 2022-03-25 PROCEDURE — 99999 PR PBB SHADOW E&M-EST. PATIENT-LVL IV: ICD-10-PCS | Mod: PBBFAC,,, | Performed by: PHYSICIAN ASSISTANT

## 2022-03-25 PROCEDURE — 3008F BODY MASS INDEX DOCD: CPT | Mod: CPTII,S$GLB,, | Performed by: PHYSICIAN ASSISTANT

## 2022-03-25 PROCEDURE — 4010F PR ACE/ARB THEARPY RXD/TAKEN: ICD-10-PCS | Mod: CPTII,S$GLB,, | Performed by: PHYSICIAN ASSISTANT

## 2022-03-25 PROCEDURE — 1160F PR REVIEW ALL MEDS BY PRESCRIBER/CLIN PHARMACIST DOCUMENTED: ICD-10-PCS | Mod: CPTII,S$GLB,, | Performed by: PHYSICIAN ASSISTANT

## 2022-03-25 PROCEDURE — 99999 PR PBB SHADOW E&M-EST. PATIENT-LVL IV: CPT | Mod: PBBFAC,,, | Performed by: PHYSICIAN ASSISTANT

## 2022-03-25 PROCEDURE — 1159F PR MEDICATION LIST DOCUMENTED IN MEDICAL RECORD: ICD-10-PCS | Mod: CPTII,S$GLB,, | Performed by: PHYSICIAN ASSISTANT

## 2022-03-25 NOTE — PROGRESS NOTES
"Subjective:     Patient ID:  Yanni Kaiser is a 51 y.o. female.    Aki    Chief Complaint:  Back pain and bilateral leg pain    HPI    Yanni Kaiser is a 51 y.o. female who presents for follow up.  Patient went to aquatic therapy after her last visit with Dr. Prabhakar a couple months ago.  It did help some but she continues to have pain in the low back down the bilateral posterior lateral legs to the ankles.  The left leg is worse than the right leg.  She is scheduled to start the functional restoration clinic next week at [facility].  She is following up with her bariatric surgeon.  She states she has been taking medication for this for some time but it is not helping with weight loss anymore.    She recently had medial branch blocks with [facility].  She has not followed up with him after that.    Patient denies any recent accidents or trauma, no saddle anesthesias, and no bowel or bladder incontinence.      Review of Systems:  Constitution: Negative for chills, fever, night sweats and weight loss.   Musculoskeletal: Negative for falls.   Gastrointestinal: Negative for bowel incontinence, nausea and vomiting.   Genitourinary: Negative for bladder incontinence.   Neurological: Negative for disturbances in coordination and loss of balance.      Objective:      Vitals:    03/25/22 0938   Weight: (!) 168.6 kg (371 lb 11.1 oz)   Height: 5' 4" (1.626 m)   PainSc:   8         Physical Exam:  Exam done in the chair      General:  Yanni Kaiser is well-developed, well-nourished, appears stated age, in no acute distress, alert and oriented to person, place, and time.    Pulmonary/Chest:  Respiratory effort normal  Abdominal: Exhibits no distension  Psychiatric:  Normal mood and affect.  Behavior is normal.  Judgement and thought content normal      Negative right and left SLR.      Neurological:  Alert and oriented to person, place, and time    Muscle strength against resistance:     Right " Left   Hip flexion  5 / 5 5 / 5   Hip extension 5 / 5 5 / 5   Hip abduction 5 / 5 5 / 5   Hip adduction  5 / 5 5 / 5   Knee extension  5 / 5 4 / 5   Knee flexion 5 / 5 4 / 5   Dorsiflexion  5 / 5 4 / 5   EHL  5 / 5 4 / 5   Plantar flexion  5 / 5 5 / 5   Inversion of the feet 5 / 5 5 / 5   Eversion of the feet  5 / 5 5 / 5       Reflexes:     Right Left   Patellar 2+ 2+   Achilles 2+ 2+     Clonus:  Negative bilaterally    On gross examination of the bilateral upper extremities, patient has full painfree ROM with no signs of clubbing, cyanosis, edema, or weakness.       XRAY/MRI Interpretation:      Lumbar spine xrays were personally reviewed today.  No fractures.  No movement on flexion and extension.  Facet arthropathy L4-S1.  DDD at L4-5 and L5-S1.  Grade one spondylolisthesis L4-5.     Lumbar spine MRI was personally reviewed today from 12/2020.  Bilateral facet arthropathy at L4-5 with moderate central stenosis.         Assessment:          1. Chronic bilateral low back pain with bilateral sciatica    2. Other spondylosis with radiculopathy, lumbar region    3. DDD (degenerative disc disease), lumbar    4. Lumbar radiculopathy    5. Spinal stenosis, lumbar region with neurogenic claudication    6. Spondylolisthesis, lumbar region            Plan:               Grade one spondylolisthesis L4-5 with spinal stenosis.      -follow-up with [facility] after medial branch block  -start functional restoration program  -follow-up with bariatric surgery regarding weight loss  -patient was told to let me know if her left leg gets weaker and she verbalized understanding  -follow-up in 6 months    Follow-Up:  Follow up in about 6 months (around 9/25/2022). If there are any questions prior to this, the patient was instructed to contact the office.       Lauren Zamora [facility], PA-C  Neurosurgery  [facility]  03/25/2022

## 2022-03-29 DIAGNOSIS — I10 BENIGN ESSENTIAL HTN: ICD-10-CM

## 2022-03-29 RX ORDER — LOSARTAN POTASSIUM 100 MG/1
100 TABLET ORAL DAILY
Qty: 90 TABLET | Refills: 0 | Status: SHIPPED | OUTPATIENT
Start: 2022-03-29 | End: 2022-10-10

## 2022-03-29 NOTE — TELEPHONE ENCOUNTER
Care Due:                  Date            Visit Type   Department     Provider  --------------------------------------------------------------------------------                                [facility]                              PRIMARY      MEDICINE/  Last Visit: 10-      CARE ([facility])   INTERNAL MED   Carlyle A  Page                              [facility]                              PRIMARY      MED/ INTERNAL  Next Visit: 04-      CARE ([facility])   MED/ PEDS      Carlyle A  Page                                                            Last  Test          Frequency    Reason                     Performed    Due Date  --------------------------------------------------------------------------------    HBA1C.......  6 months...  JARDIANCE................  08- 02-    Powered by [employer]. Reference number: [id number].   3/29/2022 6:59:48 AM CDT

## 2022-03-29 NOTE — TELEPHONE ENCOUNTER
This Rx Request does not qualify for assessment with the OR   Please review protocol details and the Care Due Message for extra clinical information    Reasons Rx Request may be deferred:  Labs/Vitals abnormal  Patient has been seen in the ED/Hospital since the last PCP visit    Note composed:2:25 PM 03/29/2022

## 2022-03-30 ENCOUNTER — OFFICE VISIT (OUTPATIENT)
Dept: UROGYNECOLOGY | Facility: CLINIC | Age: 52
End: 2022-03-30
Payer: MEDICARE

## 2022-03-30 ENCOUNTER — PATIENT MESSAGE (OUTPATIENT)
Dept: UROGYNECOLOGY | Facility: CLINIC | Age: 52
End: 2022-03-30
Payer: MEDICARE

## 2022-03-30 ENCOUNTER — LAB VISIT (OUTPATIENT)
Dept: LAB | Facility: HOSPITAL | Age: 52
End: 2022-03-30
Payer: MEDICARE

## 2022-03-30 VITALS
DIASTOLIC BLOOD PRESSURE: 80 MMHG | SYSTOLIC BLOOD PRESSURE: 122 MMHG | HEIGHT: 64 IN | WEIGHT: 293 LBS | BODY MASS INDEX: 50.02 KG/M2

## 2022-03-30 DIAGNOSIS — K59.00 CONSTIPATION, UNSPECIFIED CONSTIPATION TYPE: ICD-10-CM

## 2022-03-30 DIAGNOSIS — Z79.4 TYPE 2 DIABETES MELLITUS WITH STAGE 3A CHRONIC KIDNEY DISEASE, WITH LONG-TERM CURRENT USE OF INSULIN: ICD-10-CM

## 2022-03-30 DIAGNOSIS — E66.01 MORBID OBESITY WITH BMI OF 60.0-69.9, ADULT: ICD-10-CM

## 2022-03-30 DIAGNOSIS — B37.2 YEAST DERMATITIS: Primary | ICD-10-CM

## 2022-03-30 DIAGNOSIS — E11.22 TYPE 2 DIABETES MELLITUS WITH STAGE 3A CHRONIC KIDNEY DISEASE, WITH LONG-TERM CURRENT USE OF INSULIN: ICD-10-CM

## 2022-03-30 DIAGNOSIS — N18.31 TYPE 2 DIABETES MELLITUS WITH STAGE 3A CHRONIC KIDNEY DISEASE, WITH LONG-TERM CURRENT USE OF INSULIN: ICD-10-CM

## 2022-03-30 DIAGNOSIS — N39.41 URGE INCONTINENCE: ICD-10-CM

## 2022-03-30 LAB
ESTIMATED AVG GLUCOSE: 203 MG/DL (ref 68–131)
HBA1C MFR BLD: 8.7 % (ref 4–5.6)

## 2022-03-30 PROCEDURE — 99999 PR PBB SHADOW E&M-EST. PATIENT-LVL V: CPT | Mod: PBBFAC,,, | Performed by: NURSE PRACTITIONER

## 2022-03-30 PROCEDURE — 3074F PR MOST RECENT SYSTOLIC BLOOD PRESSURE < 130 MM HG: ICD-10-PCS | Mod: CPTII,S$GLB,, | Performed by: NURSE PRACTITIONER

## 2022-03-30 PROCEDURE — 99214 OFFICE O/P EST MOD 30 MIN: CPT | Mod: 25,S$GLB,, | Performed by: NURSE PRACTITIONER

## 2022-03-30 PROCEDURE — 83036 HEMOGLOBIN GLYCOSYLATED A1C: CPT | Performed by: NURSE PRACTITIONER

## 2022-03-30 PROCEDURE — 99499 RISK ADDL DX/OHS AUDIT: ICD-10-PCS | Mod: HCNC,S$GLB,, | Performed by: NURSE PRACTITIONER

## 2022-03-30 PROCEDURE — 3074F SYST BP LT 130 MM HG: CPT | Mod: CPTII,S$GLB,, | Performed by: NURSE PRACTITIONER

## 2022-03-30 PROCEDURE — 99999 PR PBB SHADOW E&M-EST. PATIENT-LVL V: ICD-10-PCS | Mod: PBBFAC,,, | Performed by: NURSE PRACTITIONER

## 2022-03-30 PROCEDURE — 36415 COLL VENOUS BLD VENIPUNCTURE: CPT | Performed by: NURSE PRACTITIONER

## 2022-03-30 PROCEDURE — 99499 UNLISTED E&M SERVICE: CPT | Mod: HCNC,S$GLB,, | Performed by: NURSE PRACTITIONER

## 2022-03-30 PROCEDURE — 51701 INSERT BLADDER CATHETER: CPT | Mod: S$GLB,,, | Performed by: NURSE PRACTITIONER

## 2022-03-30 PROCEDURE — 3079F PR MOST RECENT DIASTOLIC BLOOD PRESSURE 80-89 MM HG: ICD-10-PCS | Mod: CPTII,S$GLB,, | Performed by: NURSE PRACTITIONER

## 2022-03-30 PROCEDURE — 99214 PR OFFICE/OUTPT VISIT, EST, LEVL IV, 30-39 MIN: ICD-10-PCS | Mod: 25,S$GLB,, | Performed by: NURSE PRACTITIONER

## 2022-03-30 PROCEDURE — 87086 URINE CULTURE/COLONY COUNT: CPT | Performed by: NURSE PRACTITIONER

## 2022-03-30 PROCEDURE — 1159F PR MEDICATION LIST DOCUMENTED IN MEDICAL RECORD: ICD-10-PCS | Mod: CPTII,S$GLB,, | Performed by: NURSE PRACTITIONER

## 2022-03-30 PROCEDURE — 1159F MED LIST DOCD IN RCRD: CPT | Mod: CPTII,S$GLB,, | Performed by: NURSE PRACTITIONER

## 2022-03-30 PROCEDURE — 3052F HG A1C>EQUAL 8.0%<EQUAL 9.0%: CPT | Mod: CPTII,S$GLB,, | Performed by: NURSE PRACTITIONER

## 2022-03-30 PROCEDURE — 3008F PR BODY MASS INDEX (BMI) DOCUMENTED: ICD-10-PCS | Mod: CPTII,S$GLB,, | Performed by: NURSE PRACTITIONER

## 2022-03-30 PROCEDURE — 3052F PR MOST RECENT HEMOGLOBIN A1C LEVEL 8.0 - < 9.0%: ICD-10-PCS | Mod: CPTII,S$GLB,, | Performed by: NURSE PRACTITIONER

## 2022-03-30 PROCEDURE — 3079F DIAST BP 80-89 MM HG: CPT | Mod: CPTII,S$GLB,, | Performed by: NURSE PRACTITIONER

## 2022-03-30 PROCEDURE — 4010F ACE/ARB THERAPY RXD/TAKEN: CPT | Mod: CPTII,S$GLB,, | Performed by: NURSE PRACTITIONER

## 2022-03-30 PROCEDURE — 3008F BODY MASS INDEX DOCD: CPT | Mod: CPTII,S$GLB,, | Performed by: NURSE PRACTITIONER

## 2022-03-30 PROCEDURE — 4010F PR ACE/ARB THEARPY RXD/TAKEN: ICD-10-PCS | Mod: CPTII,S$GLB,, | Performed by: NURSE PRACTITIONER

## 2022-03-30 PROCEDURE — 51701 PR INSERTION OF NON-INDWELLING BLADDER CATHETERIZATION FOR RESIDUAL UR: ICD-10-PCS | Mod: S$GLB,,, | Performed by: NURSE PRACTITIONER

## 2022-03-30 PROCEDURE — 1160F RVW MEDS BY RX/DR IN RCRD: CPT | Mod: CPTII,S$GLB,, | Performed by: NURSE PRACTITIONER

## 2022-03-30 PROCEDURE — 1160F PR REVIEW ALL MEDS BY PRESCRIBER/CLIN PHARMACIST DOCUMENTED: ICD-10-PCS | Mod: CPTII,S$GLB,, | Performed by: NURSE PRACTITIONER

## 2022-03-30 RX ORDER — BUDESONIDE 0.5 MG/2ML
INHALANT ORAL
COMMUNITY
Start: 2022-01-12 | End: 2022-03-30 | Stop reason: SDUPTHER

## 2022-03-30 RX ORDER — FLUCONAZOLE 150 MG/1
TABLET ORAL
Qty: 6 TABLET | Refills: 0 | Status: SHIPPED | OUTPATIENT
Start: 2022-03-30 | End: 2022-05-17

## 2022-03-30 RX ORDER — NYSTATIN AND TRIAMCINOLONE ACETONIDE 100000; 1 [USP'U]/G; MG/G
OINTMENT TOPICAL 2 TIMES DAILY
Qty: 60 G | Refills: 2 | Status: SHIPPED | OUTPATIENT
Start: 2022-03-30

## 2022-03-30 NOTE — PROGRESS NOTES
Urogyn follow up  03/30/2022  .  [facility] - UROGYNECOLOGY  [address] [id number]    Yanni Kaiser  [id number]  1970      Yanni Kaiser is a 51 y.o.  here for a urogyn follow up of mixed urinary incontinence    Last HPI from 01/31/2022  1)  UI:  (+) KELLEY < (+) UUI  X 1years.  Changes clothes at least several times a week. Urinary frequency: Q 30 minutes.  Nocturia: Yes: 4-5/night.   (+) dysuria,  (--) hematuria,  (--) frequent UTIs.  (--) complete bladder emptying.      Drinking water, tea, lemonade     2)  POP:  Absent.Symptoms:(--)    (--) vaginal bleeding. (--) vaginal discharge. (+) sexually active.  (+) dyspareunia with initial penetration (+)  Vaginal dryness.  (--) vaginal estrogen use.      3)  BM:  (+) constipation/straining.  Taking movantik-- followed by Dr. Trejo/ Patel  (--) chronic diarrhea. (--) hematochezia.  (--) fecal incontinence.  (--) fecal smearing/urgency.  (--) complete evacuation.       4)DM  --reports FBS and random glucose     Changes from last visit:  1.  Mixed urinary incontinence, urge > stress:    --only went to pelvic floor PT eval  --+ UUI changes clothes 2-3 times / day  --+ dysruia       2. Vaginal atrophy (dryness):    --no using vaginal estrogen cream     3. Constipation:  --did not start fiber supplement  --has not seen GI     4. Obesity  --did not see bariatrics yes         Past Medical History:   Diagnosis Date    Allergy     Anemia     Anxiety     Asthma     Back pain     Chronic bronchitis     Cigarette smoker     DDD (degenerative disc disease), lumbar 6/9/2020    Depression     Diabetes with neurologic complications     DUB (dysfunctional uterine bleeding) 10/16/2018    GERD (gastroesophageal reflux disease)     High cholesterol     Hyperprolactinemia     Hypertension     Influenza A 01/16/2020    Neuromuscular disorder     Obese body habitus     Obesity     Pseudotumor cerebri     Renal  manifestation of secondary diabetes mellitus     Respiratory failure     Seizures     Simple endometrial hyperplasia 2018    Sleep apnea     Smoker     Tobacco dependence     Urinary incontinence        Past Surgical History:   Procedure Laterality Date    ANTROSTOMY OF MAXILLARY SINUS AT INFERIOR NASAL MEATUS  10/22/2021    Procedure: MAXILLARY ANTROSTOMY, AT INFERIOR NASAL MEATUS;  Surgeon: John Prado III, MD;  Location: [facility] OR;  Service: ENT;;    BREAST CYST ASPIRATION       SECTION      X 1    CHOLECYSTECTOMY      COLONOSCOPY      COLONOSCOPY N/A 2019    Procedure: COLONOSCOPY;  Surgeon: Jagruti Sweeney MD;  Location: [facility] (2ND FLR);  Service: Endoscopy;  Laterality: N/A;  BMI is 63/gastroparesis/3 days full liquid diet 1 day clear liquid see telephone encounter by [facility]    EPIDURAL STEROID INJECTION N/A 2020    Procedure: INJECTION, STEROID, EPIDURAL, L5-S1 IL;  Surgeon: Lawrence Marin MD;  Location: [facility] PAIN MGT;  Service: Pain Management;  Laterality: N/A;    ESOPHAGEAL MANOMETRY WITH MEASUREMENT OF IMPEDANCE N/A 2019    Procedure: MANOMETRY-ESOPHAGEAL-WITH IMPEDANCE;  Surgeon: Jagruti Sweeney MD;  Location: [facility] (2ND [facility]);  Service: Endoscopy;  Laterality: N/A;  Hold Narcotics x 1 days   Hold TCA x 1 days  Propofol only. No fentanyl or benzodiazepine during sedation. If additional sedation needed, discuss with Dr. Sweeney.  10/29 - pt confirmed appt    ESOPHAGOGASTRODUODENOSCOPY N/A 2019    Procedure: EGD (ESOPHAGOGASTRODUODENOSCOPY);  Surgeon: Jagruti Sweeney MD;  Location: [facility] (2ND FLR);  Service: Endoscopy;  Laterality: N/A;  BMI is 63/gastroparesis/3 days full liquid diet 1 day clear liquid see telephone encounter by Ciara Brown     ESOPHAGOGASTRODUODENOSCOPY N/A 2019    Procedure: ESOPHAGOGASTRODUODENOSCOPY (EGD);  Surgeon: Jagruti Sweeney MD;  Location: [facility] (2ND FLR);  Service: Endoscopy;   Laterality: N/A;  EGD with EndoFlip /+/- Botox  2nd floor for gastroparesis/BMI 63 **367lbs**  Bariatric Stretcher needed  Manometry probe to be placed endoscopically during EGD procedure  Due to change in protocol, Full liquid diet for 3 days/ 1 day of clear liquids  Hi    ESOPHAGOGASTRODUODENOSCOPY N/A 12/14/2020    Procedure: ESOPHAGOGASTRODUODENOSCOPY (EGD) with BRAVO;  Surgeon: Jagruti Sweeney MD;  Location: [facility] (2ND FLR);  Service: Endoscopy;  Laterality: N/A;  with Dilation  2nd floor due to BMI 56.20 (327 lbs) and gastroparesis  3 days full liquid diet and 1 day clears    ESOPHAGOGASTRODUODENOSCOPY N/A 4/15/2021    Procedure: ESOPHAGOGASTRODUODENOSCOPY (EGD);  Surgeon: Jagruti Sweeney MD;  Location: [facility] ENDO (2ND FLR);  Service: Endoscopy;  Laterality: N/A;  Endoflip  2nd floor-gastroparesis, BMI 57.18, bariatric stretcher  full liquid diet x3 days, clear liquid diet x1 day prior to procedure  propofol only  covid test 4/12 primary care, instructions emailed-[facility]    FOOT FRACTURE SURGERY Left     FUNCTIONAL ENDOSCOPIC SINUS SURGERY (FESS) USING COMPUTER-ASSISTED NAVIGATION Bilateral 10/22/2021    Procedure: FESS, USING COMPUTER-ASSISTED NAVIGATION;  Surgeon: John Prado III, MD;  Location: [facility] OR;  Service: ENT;  Laterality: Bilateral;  FOLLOW DR PRADO ANESTHESIA PROTOCAL / pt will stay 23 hour post surgery for SHARATH observation    HYSTEROSCOPY WITH DILATION AND CURETTAGE OF UTERUS N/A 10/16/2018    KJB    INJECTION OF ANESTHETIC AGENT AROUND NERVE Bilateral 10/23/2020    Procedure: BLOCK, NERVE  bilateral L3,4,5 MBB;  Surgeon: Lawrence Marin MD;  Location: [facility] PAIN MGT;  Service: Pain Management;  Laterality: Bilateral;  bilateral L3,4,5 MBB   consent needed    INJECTION OF ANESTHETIC AGENT AROUND NERVE Bilateral 2/18/2022    Procedure: BLOCK, NERVE, L3-L4-L5 MEDIAL BRANCH;  Surgeon: Lawrence Marin MD;  Location: [facility] PAIN MGT;  Service: Pain Management;  Laterality: Bilateral;     PLANTAR FASCIOTOMY Left 11/18/2019    Procedure: FASCIOTOMY, PLANTAR;  Surgeon: Valentino Orourke DPM;  Location: [facility] OR 2ND FLR;  Service: Podiatry;  Laterality: Left;    RETINAL LASER PROCEDURE Left     SINUS SURGERY      SPHENOIDECTOMY Bilateral 10/22/2021    Procedure: SPHENOIDECTOMY;  Surgeon: John Prado III, MD;  Location: [facility] [place] OR;  Service: ENT;  Laterality: Bilateral;    TRANSFORAMINAL EPIDURAL INJECTION OF STEROID Bilateral 6/24/2020    Procedure: INJECTION, STEROID, EPIDURAL, TRANSFORAMINAL APPROACH;  Surgeon: Lawrence Marin MD;  Location: [facility] [place] PAIN MGT;  Service: Pain Management;  Laterality: Bilateral;    TRANSFORAMINAL EPIDURAL INJECTION OF STEROID Bilateral 1/28/2022    Procedure: INJECTION, STEROID, EPIDURAL, TRANSFORAMINAL APPROACH  Bilateral TFESI L4/L5      NEEDS CONSENT;  Surgeon: Lawrence Marin MD;  Location: [facility] [place] PAIN MGT;  Service: Pain Management;  Laterality: Bilateral;    UPPER GASTROINTESTINAL ENDOSCOPY         Family History   Problem Relation Age of Onset    Hypertension Mother     Diabetes Mother     Colon cancer Mother 50    Colon polyps Mother     Cataracts Mother     Heart disease Father     COPD Father     Heart attack Father     Hypertension Father     Ulcers Father     Cataracts Father     Glaucoma Father     Cancer Maternal Grandmother     Breast cancer Maternal Grandmother     Colon cancer Maternal Grandmother     Colon polyps Maternal Grandmother     No Known Problems Paternal Grandmother     No Known Problems Brother     No Known Problems Maternal Aunt     No Known Problems Maternal Uncle     No Known Problems Paternal Aunt     No Known Problems Paternal Uncle     No Known Problems Maternal Grandfather     No Known Problems Paternal Grandfather     No Known Problems Daughter     No Known Problems Son     No Known Problems Daughter     No Known Problems Daughter     Celiac disease Neg Hx     Cirrhosis Neg Hx     Crohn's disease Neg  Hx     Cystic fibrosis Neg Hx     Esophageal cancer Neg Hx     Hemochromatosis Neg Hx     Inflammatory bowel disease Neg Hx     Irritable bowel syndrome Neg Hx     Liver cancer Neg Hx     Liver disease Neg Hx     Rectal cancer Neg Hx     Stomach cancer Neg Hx     Ulcerative colitis Neg Hx     Jonnie's disease Neg Hx     Tuberculosis Neg Hx     Lymphoma Neg Hx     Scleroderma Neg Hx     Rheum arthritis Neg Hx     Melanoma Neg Hx     Multiple sclerosis Neg Hx     Psoriasis Neg Hx     Lupus Neg Hx     Skin cancer Neg Hx     Amblyopia Neg Hx     Blindness Neg Hx     Macular degeneration Neg Hx     Retinal detachment Neg Hx     Strabismus Neg Hx     Stroke Neg Hx     Thyroid disease Neg Hx     Allergic rhinitis Neg Hx     Allergies Neg Hx     Angioedema Neg Hx     Asthma Neg Hx     Eczema Neg Hx     Immunodeficiency Neg Hx     Rhinitis Neg Hx     Urticaria Neg Hx     Atopy Neg Hx        Social History     Socioeconomic History    Marital status:    Tobacco Use    Smoking status: Current Every Day Smoker     Packs/day: 1.00     Years: 27.00     Pack years: 27.00     Types: Cigarettes     Start date: 1/1/1992    Smokeless tobacco: Never Used    Tobacco comment: 1/2 a pack if her days are good   Substance and Sexual Activity    Alcohol use: Not Currently     Alcohol/week: 0.0 standard drinks     Comment: socially    Drug use: No    Sexual activity: Yes     Partners: Male     Birth control/protection: None     Comment:      Social Determinants of Health     Financial Resource Strain: Medium Risk    Difficulty of Paying Living Expenses: Somewhat hard   Food Insecurity: Food Insecurity Present    Worried About Running Out of Food in the Last Year: Sometimes true    Ran Out of Food in the Last Year: Sometimes true   Transportation Needs: No Transportation Needs    Lack of Transportation (Medical): No    Lack of Transportation (Non-Medical): No   Physical Activity:  Inactive    Days of Exercise per Week: 0 days    Minutes of Exercise per Session: 0 min   Stress: Stress Concern Present    Feeling of Stress : To some extent   Social Connections: Unknown    Frequency of Communication with Friends and Family: Never    Frequency of Social Gatherings with Friends and Family: Never    Active Member of Clubs or Organizations: Yes    Attends Club or Organization Meetings: More than 4 times per year    Marital Status:    Housing Stability: High Risk    Unable to Pay for Housing in the Last Year: Yes    Number of Places Lived in the Last Year: 2    Unstable Housing in the Last Year: Yes       Current Outpatient Medications   Medication Sig Dispense Refill    albuterol (PROVENTIL) 2.5 mg /3 mL (0.083 %) nebulizer solution Take 3 mLs (2.5 mg total) by nebulization every 6 (six) hours as needed for Wheezing. Rescue 3 mL 11    albuterol (PROVENTIL/VENTOLIN HFA) 90 mcg/actuation inhaler Inhale 2 puffs into the lungs every 6 (six) hours as needed for Wheezing or Shortness of Breath. Rescue 54 g 6    alcohol swabs (BD ALCOHOL SWABS) PadM Apply 1 each topically 2 (two) times a day. 200 each 4    allerg xt,D.farinae-D.pteronys (ODACTRA) 12 SQ-HDM Subl Place 1 tablet under the tongue once daily. 30 tablet 11    ammonium lactate 12 % Crea Apply twice daily to affected parts both feet as needed. 140 g 11    azelastine (ASTELIN) 137 mcg (0.1 %) nasal spray 1 spray (137 mcg total) by Nasal route 2 (two) times daily. 30 mL 11    blood sugar diagnostic Strp 1 each by Misc.(Non-Drug; Combo Route) route 4 (four) times daily before meals and nightly. ACCU CHEK ELVIA PLUS METER 200 each 3    blood-glucose meter (ACCU-CHEK ELVIA PLUS METER) Misc TEST FOUR TIMES DAILY BEFORE MEALS AND EVERY NIGHT 90 each 1    budesonide (PULMICORT) 0.5 mg/2 mL nebulizer solution EMPTY CONTENTS OF 1 VIAL INTO NASAL IRRIGATION SYSTEM, ADD DISTILLED WATER, SALT PACK, MIX & IRRIGATE. PERFORM TWICE DAILY  FOR 30 DAYS. THEN 1-2 TIMES DAILY AS DIRECTED BY ENT.      budesonide-formoterol 160-4.5 mcg (SYMBICORT) 160-4.5 mcg/actuation HFAA Inhale 2 puffs into the lungs every 12 (twelve) hours. Controller 10.2 g 11    butalbital-acetaminophen-caffeine -40 mg (FIORICET, ESGIC) -40 mg per tablet Take 1 tablet by mouth every 4 (four) hours as needed for Pain. 12 tablet 0    cetirizine (ZYRTEC) 10 MG tablet Take 1 tablet (10 mg total) by mouth daily as needed for Allergies (itching). 30 tablet 0    cyclobenzaprine (FLEXERIL) 10 MG tablet Take 10 mg by mouth 3 (three) times daily.      diclofenac sodium (VOLTAREN) 1 % Gel Apply 2 g topically 2 (two) times daily. 100 g 2    estradioL (ESTRACE) 0.01 % (0.1 mg/gram) vaginal cream Use 1 gram of estrogen cream in vagina nightly x 2 weeks, then twice a week thereafter. 42.5 g 3    fluticasone propionate (FLONASE) 50 mcg/actuation nasal spray 2 sprays (100 mcg total) by Each Nostril route 2 (two) times a day. 16 g 11    JARDIANCE 10 mg tablet TAKE 1 TABLET(10 MG) BY MOUTH EVERY DAY 30 tablet 1    lancets (ACCU-CHEK FASTCLIX LANCET DRUM) Misc 1 Device by Misc.(Non-Drug; Combo Route) route 2 (two) times a day. 200 each 4    lancets (ACCU-CHEK SOFTCLIX LANCETS) Misc 1 Device by Misc.(Non-Drug; Combo Route) route 4 (four) times daily. 200 each 3    levocetirizine (XYZAL) 5 MG tablet Take 1 tablet (5 mg total) by mouth every evening. 90 tablet 3    lidocaine (LIDODERM) 5 % Place 1 patch onto the skin once daily. Remove & Discard patch within 12 hours or as directed by MD 15 patch 0    LIDOcaine HCL 2% (XYLOCAINE) 2 % jelly Apply topically as needed. Apply topically once nightly to affected part of foot/feet. 30 mL 2    losartan (COZAAR) 100 MG tablet Take 1 tablet (100 mg total) by mouth once daily. 90 tablet 0    montelukast (SINGULAIR) 10 mg tablet Take 1 tablet (10 mg total) by mouth every evening. 30 tablet 11    MOVANTIK 12.5 mg Tab Take 1 tablet by mouth  once daily.      nicotine (NICODERM CQ) 21 mg/24 hr Place 1 patch onto the skin once daily. 28 patch 0    nicotine, polacrilex, (NICORETTE) 2 mg Gum Take 1 each (2 mg total) by mouth as needed (Take 1 piece as needed.  Maximum of 10 per day.). 220 each 0    ondansetron (ZOFRAN) 8 MG tablet Take 1 tablet (8 mg total) by mouth every 8 (eight) hours as needed for Nausea. 90 tablet 11    oxyCODONE-acetaminophen (PERCOCET) 7.5-325 mg per tablet Take 1 tablet by mouth 3 (three) times daily as needed for Pain. 90 tablet 0    OZEMPIC 1 mg/dose (4 mg/3 mL) Inject 1 mg into the skin every 7 days. 3 pen 0    pantoprazole (PROTONIX) 40 MG tablet TAKE 1 TABLET(40 MG) BY MOUTH TWICE DAILY 60 tablet 6    QUEtiapine (SEROQUEL) 25 MG Tab Take 1 tablet (25 mg total) by mouth once daily. 90 tablet 0    sodium chloride (SALINE NASAL) 0.65 % nasal spray 2 sprays by Nasal route as needed for Congestion. 104 mL 12    TROKENDI  mg Cp24       venlafaxine (EFFEXOR-XR) 75 MG 24 hr capsule Take 1 capsule (75 mg total) by mouth once daily. NO FURTHER REFILL WITHOUT APT 90 capsule 0    EPINEPHrine (EPIPEN) 0.3 mg/0.3 mL AtIn Inject 0.3 mLs (0.3 mg total) into the muscle once. for 1 dose 2 each 1    fluconazole (DIFLUCAN) 150 MG Tab Take one pill every other day x 3 doses, then once weekly x 3 weeks 6 tablet 0    nystatin-triamcinolone (MYCOLOG) ointment Apply topically 2 (two) times daily. 60 g 2    pregabalin (LYRICA) 50 MG capsule Take 1 capsule (50 mg total) by mouth 3 (three) times daily. 90 capsule 3    valACYclovir (VALTREX) 1000 MG tablet Take 1 tablet (1,000 mg total) by mouth 2 (two) times daily. for 7 days 14 tablet 0     Current Facility-Administered Medications   Medication Dose Route Frequency Provider Last Rate Last Admin    acetaminophen tablet 650 mg  650 mg Oral Once PRN Carlyle Gordillo MD        albuterol inhaler 2 puff  2 puff Inhalation Q20 Min PRN Carlyle Gordillo MD        diphenhydrAMINE injection  "25 mg  25 mg Intravenous Once PRN Carlyle Gordillo MD        EPINEPHrine (EPIPEN) 0.3 mg/0.3 mL pen injection 0.3 mg  0.3 mg Intramuscular PRN Carlyle Gordillo MD        methylPREDNISolone sodium succinate injection 40 mg  40 mg Intravenous Once PRN Carlyle Gordillo MD        ondansetron disintegrating tablet 4 mg  4 mg Oral Once PRN Carlyle Gordillo MD        sodium chloride 0.9% 500 mL flush bag   Intravenous PRN Carlyle Gordillo MD        sodium chloride 0.9% flush 10 mL  10 mL Intravenous PRN Carlyle Gordillo MD           Review of patient's allergies indicates:   Allergen Reactions    Gabapentin Other (See Comments)     Makes her twitch       Well woman:  Pap test: 07/2018 normal no ecc.  History of abnormal paps: No.  History of STIs:  No  Mammogram: Date of last: 01/2022  Result: Normal HPV normal  Colonoscopy: Date of last: 01/14/2019.  Result:  Hemorrhoids found on perianal exam.                         - One 3 mm polyp in the cecum, removed with a jumbo cold forceps. Resected and retrieved.                         - Medium-sized lipoma in the ascending colon. No specimens collected.                         - Diverticulosis in the recto-sigmoid colon and in the cecum.                         - Two 3 to 5 mm polyps in the sigmoid colon, removed with a jumbo cold forceps. Resected and retrieved.                         - Multiple small polyps in the rectum and at the recto-sigmoid colon. Biopsied.                         - The distal rectum and anal verge are normal on retroflexion view. .  Repeat due:  2024.    DEXA:  N/a    ROS:  As per HPI.      Exam  /80 (BP Location: Right arm, Patient Position: Sitting, BP Method: Large (Manual))   Ht 5' 4" (1.626 m)   Wt (!) 167 kg (368 lb 2.7 oz)   LMP 08/17/2019   BMI 63.20 kg/m²   General: alert and oriented, no acute distress  Respiratory: normal respiratory effort  Abd: soft, non-tender, non-distended    Pelvic  Ext. Genitalia: normal external " genitalia. Normal bartholin's and skeens glands. Fine red, raised maculopapular rash all along labia and perineum consistent with yeast dermatitis  Rest of pelvic deferred    Impression  1. Yeast dermatitis  fluconazole (DIFLUCAN) 150 MG Tab    Hemoglobin A1C    nystatin-triamcinolone (MYCOLOG) ointment   2. Urge incontinence  Urine culture   3. Type 2 diabetes mellitus with stage 3a chronic kidney disease, with long-term current use of insulin  Hemoglobin A1C   4. Constipation, unspecified constipation type     5. Morbid obesity with BMI of 60.0-69.9, adult       We reviewed the above issues and discussed options for short-term versus long-term management of her problems.   Plan:      1.  Mixed urinary incontinence, urge > stress:    --Empty bladder every 3 hours.  Empty well: wait a minute, lean forward on toilet.    --Avoid dietary irritants (see sheet).  Keep diary x 3-5 days to determine your irritants.  --will hold off on pelvic floor-- starting restoration program  --URGE: consider anticholinergic. Takes 2-4 weeks to see if will have effect.  For dry mouth: get sour, sugar free lozenge or gum.    --STRESS:  Pessary vs. Sling.   --urine culture today  --important to keep blood glucose in normal range  --A1C today     2. Vaginal atrophy (dryness):  Use 1 gram of estrogen cream in vagina twice a week thereafter.      3. Constipation:  --Start daily fiber.  Take 1 tsp of fiber powder (psyllium or other sugar-free powder).  Mix in 8 oz of water.  Take x 3-5 days.  Then, increase fiber by 1 tsp every 3-5 days until stool is easy to pass.  Stop and continue at that dose.   Do not exceed 6 tsps/day.  May also use over the counter stool softener 1-2 x/day.  AVOID laxatives.  --continue follow up with GI-- may need to call to schedule     4. Obesity  --bariatrics scheduled       5. Yeast dermatitis  --need to control blood glucose  --diflucan 150 mg every other day x 3 doses then once weekly x 3 weeks  --mycolog II  ointment twice weekly x 2 weeks    6.  RTC 3 weeks for follow up      I spent a total of 30 minutes on the day of the visit.  This includes face to face time and non-face to face time preparing to see the patient (eg, review of tests), obtaining and/or reviewing separately obtained history, documenting clinical information in the electronic or other health record, independently interpreting results and communicating results to the patient/family/caregiver, or care coordinator.      Kristin Morales, KAMILA-BC  [facility]  Division of Female Pelvic Medicine and Reconstructive Surgery  Department of Obstetrics & Gynecology

## 2022-03-30 NOTE — PATIENT INSTRUCTIONS
1.  Mixed urinary incontinence, urge > stress:    --Empty bladder every 3 hours.  Empty well: wait a minute, lean forward on toilet.    --Avoid dietary irritants (see sheet).  Keep diary x 3-5 days to determine your irritants.  --will hold off on pelvic floor-- starting restoration program  --URGE: consider anticholinergic. Takes 2-4 weeks to see if will have effect.  For dry mouth: get sour, sugar free lozenge or gum.    --STRESS:  Pessary vs. Sling.   --urine culture today  --important to keep blood glucose in normal range  --A1C today     2. Vaginal atrophy (dryness):  Use 1 gram of estrogen cream in vagina twice a week thereafter.      3. Constipation:  --Start daily fiber.  Take 1 tsp of fiber powder (psyllium or other sugar-free powder).  Mix in 8 oz of water.  Take x 3-5 days.  Then, increase fiber by 1 tsp every 3-5 days until stool is easy to pass.  Stop and continue at that dose.   Do not exceed 6 tsps/day.  May also use over the counter stool softener 1-2 x/day.  AVOID laxatives.  --continue follow up with GI-- may need to call to schedule     4. Obesity  --bariatrics scheduled       5. Yeast dermatitis  --need to control blood glucose  --diflucan 150 mg every other day x 3 doses then once weekly x 3 weeks  --mycolog II ointment twice weekly x 2 weeks    6.  RTC 3 weeks for follow up

## 2022-03-31 ENCOUNTER — SPECIALTY PHARMACY (OUTPATIENT)
Dept: PHARMACY | Facility: CLINIC | Age: 52
End: 2022-03-31
Payer: MEDICARE

## 2022-03-31 ENCOUNTER — PATIENT MESSAGE (OUTPATIENT)
Dept: UROGYNECOLOGY | Facility: CLINIC | Age: 52
End: 2022-03-31
Payer: MEDICARE

## 2022-03-31 ENCOUNTER — TELEPHONE (OUTPATIENT)
Dept: FAMILY MEDICINE | Facility: CLINIC | Age: 52
End: 2022-03-31
Payer: MEDICARE

## 2022-03-31 LAB — BACTERIA UR CULT: NO GROWTH

## 2022-03-31 NOTE — TELEPHONE ENCOUNTER
Specialty Pharmacy - Refill Coordination    Specialty Medication Orders Linked to Encounter    Flowsheet Row Most Recent Value   Medication #1 allerg xt,D.farinae-D.pteronys (ODACTRA) 12 SQ-HDM Subl (Order#[id number], Rx#[id number])          Refill Questions - Documented Responses    Flowsheet Row Most Recent Value   Patient Availability and HIPAA Verification    Does patient want to proceed with activity? Yes   HIPAA/medical authority confirmed? Yes   Relationship to patient of person spoken to? Self   Refill Screening Questions    Changes to allergies? No   Changes to medications? No   New conditions since last clinic visit? No   Unplanned office visit, urgent care, ED, or hospital admission in the last 4 weeks? No   How does patient/caregiver feel medication is working? Good   Financial problems or insurance changes? No   How many doses of your specialty medications were missed in the last 4 weeks? 0   Would patient like to speak to a pharmacist? No   When does the patient need to receive the medication? 04/07/22   Refill Delivery Questions    How will the patient receive the medication? Pickup   When does the patient need to receive the medication? 04/07/22   Shipping Address Home   Address in [facility] confirmed and updated if neccessary? Yes   Expected Copay ($) 4   Payment Method at pickup   Days supply of Refill 30   Supplies needed? No supplies needed   Refill activity completed? Yes   Refill activity plan Refill scheduled   Shipment/Pickup Date: 04/05/22          Current Outpatient Medications   Medication Sig    albuterol (PROVENTIL) 2.5 mg /3 mL (0.083 %) nebulizer solution Take 3 mLs (2.5 mg total) by nebulization every 6 (six) hours as needed for Wheezing. Rescue    albuterol (PROVENTIL/VENTOLIN HFA) 90 mcg/actuation inhaler Inhale 2 puffs into the lungs every 6 (six) hours as needed for Wheezing or Shortness of Breath. Rescue    alcohol swabs (BD ALCOHOL SWABS) PadM Apply 1 each topically 2  (two) times a day.    allerg xt,D.farinae-D.pteronys (ODACTRA) 12 SQ-HDM Subl Place 1 tablet under the tongue once daily.    ammonium lactate 12 % Crea Apply twice daily to affected parts both feet as needed.    azelastine (ASTELIN) 137 mcg (0.1 %) nasal spray 1 spray (137 mcg total) by Nasal route 2 (two) times daily.    blood sugar diagnostic Strp 1 each by Misc.(Non-Drug; Combo Route) route 4 (four) times daily before meals and nightly. ACCU CHEK ELVIA PLUS METER    blood-glucose meter (ACCU-CHEK ELVIA PLUS METER) Misc TEST FOUR TIMES DAILY BEFORE MEALS AND EVERY NIGHT    budesonide-formoterol 160-4.5 mcg (SYMBICORT) 160-4.5 mcg/actuation HFAA Inhale 2 puffs into the lungs every 12 (twelve) hours. Controller    butalbital-acetaminophen-caffeine -40 mg (FIORICET, ESGIC) -40 mg per tablet Take 1 tablet by mouth every 4 (four) hours as needed for Pain.    cetirizine (ZYRTEC) 10 MG tablet Take 1 tablet (10 mg total) by mouth daily as needed for Allergies (itching).    cyclobenzaprine (FLEXERIL) 10 MG tablet Take 10 mg by mouth 3 (three) times daily.    diclofenac sodium (VOLTAREN) 1 % Gel Apply 2 g topically 2 (two) times daily.    EPINEPHrine (EPIPEN) 0.3 mg/0.3 mL AtIn Inject 0.3 mLs (0.3 mg total) into the muscle once. for 1 dose    estradioL (ESTRACE) 0.01 % (0.1 mg/gram) vaginal cream Use 1 gram of estrogen cream in vagina nightly x 2 weeks, then twice a week thereafter.    fluconazole (DIFLUCAN) 150 MG Tab Take one pill every other day x 3 doses, then once weekly x 3 weeks    fluticasone propionate (FLONASE) 50 mcg/actuation nasal spray 2 sprays (100 mcg total) by Each Nostril route 2 (two) times a day.    JARDIANCE 10 mg tablet TAKE 1 TABLET(10 MG) BY MOUTH EVERY DAY    lancets (ACCU-CHEK FASTCLIX LANCET DRUM) Misc 1 Device by Misc.(Non-Drug; Combo Route) route 2 (two) times a day.    lancets (ACCU-CHEK SOFTCLIX LANCETS) Misc 1 Device by Misc.(Non-Drug; Combo Route) route 4 (four)  times daily.    levocetirizine (XYZAL) 5 MG tablet Take 1 tablet (5 mg total) by mouth every evening.    lidocaine (LIDODERM) 5 % Place 1 patch onto the skin once daily. Remove & Discard patch within 12 hours or as directed by MD    LIDOcaine HCL 2% (XYLOCAINE) 2 % jelly Apply topically as needed. Apply topically once nightly to affected part of foot/feet.    losartan (COZAAR) 100 MG tablet Take 1 tablet (100 mg total) by mouth once daily.    montelukast (SINGULAIR) 10 mg tablet Take 1 tablet (10 mg total) by mouth every evening.    MOVANTIK 12.5 mg Tab Take 1 tablet by mouth once daily.    nicotine (NICODERM CQ) 21 mg/24 hr Place 1 patch onto the skin once daily.    nicotine, polacrilex, (NICORETTE) 2 mg Gum Take 1 each (2 mg total) by mouth as needed (Take 1 piece as needed.  Maximum of 10 per day.).    nystatin-triamcinolone (MYCOLOG) ointment Apply topically 2 (two) times daily.    ondansetron (ZOFRAN) 8 MG tablet Take 1 tablet (8 mg total) by mouth every 8 (eight) hours as needed for Nausea.    oxyCODONE-acetaminophen (PERCOCET) 7.5-325 mg per tablet Take 1 tablet by mouth 3 (three) times daily as needed for Pain.    OZEMPIC 1 mg/dose (4 mg/3 mL) Inject 1 mg into the skin every 7 days.    pantoprazole (PROTONIX) 40 MG tablet TAKE 1 TABLET(40 MG) BY MOUTH TWICE DAILY    pregabalin (LYRICA) 50 MG capsule Take 1 capsule (50 mg total) by mouth 3 (three) times daily.    QUEtiapine (SEROQUEL) 25 MG Tab Take 1 tablet (25 mg total) by mouth once daily.    sodium chloride (SALINE NASAL) 0.65 % nasal spray 2 sprays by Nasal route as needed for Congestion.    TROKENDI  mg Cp24     valACYclovir (VALTREX) 1000 MG tablet Take 1 tablet (1,000 mg total) by mouth 2 (two) times daily. for 7 days    venlafaxine (EFFEXOR-XR) 75 MG 24 hr capsule Take 1 capsule (75 mg total) by mouth once daily. NO FURTHER REFILL WITHOUT APT   Last reviewed on 3/30/2022 12:36 PM by Kristin Morales NP    Review of  patient's allergies indicates:   Allergen Reactions    Gabapentin Other (See Comments)     Makes her twitch    Last reviewed on  3/30/2022 12:36 PM by Kristin Morales      Tasks added this encounter   4/30/2022 - Refill Call (Auto Added)  4/6/2022 - Pickup Reminder   Tasks due within next 3 months   6/12/2022 - Clinical - Follow Up Assesement (180 day)     [facility]  [address] [place] [id number]  Phone: [phone]  Fax: [phone]

## 2022-04-01 ENCOUNTER — OFFICE VISIT (OUTPATIENT)
Dept: FAMILY MEDICINE | Facility: CLINIC | Age: 52
End: 2022-04-01
Payer: MEDICARE

## 2022-04-01 VITALS
WEIGHT: 293 LBS | HEART RATE: 96 BPM | HEIGHT: 64 IN | DIASTOLIC BLOOD PRESSURE: 82 MMHG | TEMPERATURE: 98 F | RESPIRATION RATE: 18 BRPM | BODY MASS INDEX: 50.02 KG/M2 | OXYGEN SATURATION: 96 % | SYSTOLIC BLOOD PRESSURE: 124 MMHG

## 2022-04-01 DIAGNOSIS — E11.51 DIABETES MELLITUS WITH PERIPHERAL CIRCULATORY DISORDER: ICD-10-CM

## 2022-04-01 DIAGNOSIS — F33.42 RECURRENT MAJOR DEPRESSIVE DISORDER, IN FULL REMISSION: Primary | ICD-10-CM

## 2022-04-01 DIAGNOSIS — G40.909 NONINTRACTABLE EPILEPSY WITHOUT STATUS EPILEPTICUS, UNSPECIFIED EPILEPSY TYPE: ICD-10-CM

## 2022-04-01 DIAGNOSIS — J44.9 CHRONIC OBSTRUCTIVE PULMONARY DISEASE, UNSPECIFIED COPD TYPE: ICD-10-CM

## 2022-04-01 DIAGNOSIS — G43.009 MIGRAINE WITHOUT AURA AND WITHOUT STATUS MIGRAINOSUS, NOT INTRACTABLE: ICD-10-CM

## 2022-04-01 PROCEDURE — 3008F BODY MASS INDEX DOCD: CPT | Mod: CPTII,S$GLB,, | Performed by: FAMILY MEDICINE

## 2022-04-01 PROCEDURE — 1159F PR MEDICATION LIST DOCUMENTED IN MEDICAL RECORD: ICD-10-PCS | Mod: CPTII,S$GLB,, | Performed by: FAMILY MEDICINE

## 2022-04-01 PROCEDURE — 99214 OFFICE O/P EST MOD 30 MIN: CPT | Mod: S$GLB,,, | Performed by: FAMILY MEDICINE

## 2022-04-01 PROCEDURE — 4010F PR ACE/ARB THEARPY RXD/TAKEN: ICD-10-PCS | Mod: CPTII,S$GLB,, | Performed by: FAMILY MEDICINE

## 2022-04-01 PROCEDURE — 1160F RVW MEDS BY RX/DR IN RCRD: CPT | Mod: CPTII,S$GLB,, | Performed by: FAMILY MEDICINE

## 2022-04-01 PROCEDURE — 1159F MED LIST DOCD IN RCRD: CPT | Mod: CPTII,S$GLB,, | Performed by: FAMILY MEDICINE

## 2022-04-01 PROCEDURE — 1160F PR REVIEW ALL MEDS BY PRESCRIBER/CLIN PHARMACIST DOCUMENTED: ICD-10-PCS | Mod: CPTII,S$GLB,, | Performed by: FAMILY MEDICINE

## 2022-04-01 PROCEDURE — 99999 PR PBB SHADOW E&M-EST. PATIENT-LVL V: ICD-10-PCS | Mod: PBBFAC,,, | Performed by: FAMILY MEDICINE

## 2022-04-01 PROCEDURE — 3008F PR BODY MASS INDEX (BMI) DOCUMENTED: ICD-10-PCS | Mod: CPTII,S$GLB,, | Performed by: FAMILY MEDICINE

## 2022-04-01 PROCEDURE — 99999 PR PBB SHADOW E&M-EST. PATIENT-LVL V: CPT | Mod: PBBFAC,,, | Performed by: FAMILY MEDICINE

## 2022-04-01 PROCEDURE — 3074F SYST BP LT 130 MM HG: CPT | Mod: CPTII,S$GLB,, | Performed by: FAMILY MEDICINE

## 2022-04-01 PROCEDURE — 3052F PR MOST RECENT HEMOGLOBIN A1C LEVEL 8.0 - < 9.0%: ICD-10-PCS | Mod: CPTII,S$GLB,, | Performed by: FAMILY MEDICINE

## 2022-04-01 PROCEDURE — 3079F DIAST BP 80-89 MM HG: CPT | Mod: CPTII,S$GLB,, | Performed by: FAMILY MEDICINE

## 2022-04-01 PROCEDURE — 3079F PR MOST RECENT DIASTOLIC BLOOD PRESSURE 80-89 MM HG: ICD-10-PCS | Mod: CPTII,S$GLB,, | Performed by: FAMILY MEDICINE

## 2022-04-01 PROCEDURE — 3074F PR MOST RECENT SYSTOLIC BLOOD PRESSURE < 130 MM HG: ICD-10-PCS | Mod: CPTII,S$GLB,, | Performed by: FAMILY MEDICINE

## 2022-04-01 PROCEDURE — 99214 PR OFFICE/OUTPT VISIT, EST, LEVL IV, 30-39 MIN: ICD-10-PCS | Mod: S$GLB,,, | Performed by: FAMILY MEDICINE

## 2022-04-01 PROCEDURE — 3052F HG A1C>EQUAL 8.0%<EQUAL 9.0%: CPT | Mod: CPTII,S$GLB,, | Performed by: FAMILY MEDICINE

## 2022-04-01 PROCEDURE — 4010F ACE/ARB THERAPY RXD/TAKEN: CPT | Mod: CPTII,S$GLB,, | Performed by: FAMILY MEDICINE

## 2022-04-01 RX ORDER — IOHEXOL 300 MG/ML
INJECTION, SOLUTION INTRAVENOUS
COMMUNITY
Start: 2022-01-28 | End: 2022-04-01

## 2022-04-01 RX ORDER — FENTANYL CITRATE 50 UG/ML
INJECTION, SOLUTION INTRAMUSCULAR; INTRAVENOUS
COMMUNITY
Start: 2022-01-28 | End: 2022-04-01

## 2022-04-01 RX ORDER — MIDAZOLAM HYDROCHLORIDE 1 MG/ML
INJECTION INTRAMUSCULAR; INTRAVENOUS
COMMUNITY
Start: 2022-01-28 | End: 2022-04-01

## 2022-04-01 RX ORDER — DEXAMETHASONE SODIUM PHOSPHATE 10 MG/ML
INJECTION INTRAMUSCULAR; INTRAVENOUS
COMMUNITY
Start: 2022-01-28 | End: 2022-04-01

## 2022-04-01 RX ORDER — VENLAFAXINE HYDROCHLORIDE 75 MG/1
75 CAPSULE, EXTENDED RELEASE ORAL DAILY
Qty: 90 CAPSULE | Refills: 1 | Status: SHIPPED | OUTPATIENT
Start: 2022-04-01 | End: 2022-12-13

## 2022-04-01 NOTE — PROGRESS NOTES
Routine Office Visit    Patient Name: Yanni Kaiser    : 1970  MRN: [id number]    Subjective:  Yanni is a 51 y.o. female who presents today for:    1. Follow up  Patient presenting today for refill of antidepressant.  She states she is doing well with it.  She did recently get her a1c checked and it was >8.  She has since then restarted jardiance and ozempic.  She states she had gotten a yeast infection with jardiance, so had stopped.  She had gone through a depression episode and states she wasn't doing anything active.  She feels that caused her to gain weight and her blood sugars to go up higher.      Past Medical History  Past Medical History:   Diagnosis Date    Allergy     Anemia     Anxiety     Asthma     Back pain     Chronic bronchitis     Chronic obstructive pulmonary disease, unspecified COPD type 2022    Cigarette smoker     DDD (degenerative disc disease), lumbar 2020    Depression     Diabetes mellitus with peripheral circulatory disorder 2022    Diabetes with neurologic complications     DUB (dysfunctional uterine bleeding) 10/16/2018    GERD (gastroesophageal reflux disease)     High cholesterol     Hyperprolactinemia     Hypertension     Influenza A 2020    Neuromuscular disorder     Obese body habitus     Obesity     Pseudotumor cerebri     Renal manifestation of secondary diabetes mellitus     Respiratory failure     Seizures     Simple endometrial hyperplasia     Sleep apnea     Smoker     Tobacco dependence     Urinary incontinence        Past Surgical History  Past Surgical History:   Procedure Laterality Date    ANTROSTOMY OF MAXILLARY SINUS AT INFERIOR NASAL MEATUS  10/22/2021    Procedure: MAXILLARY ANTROSTOMY, AT INFERIOR NASAL MEATUS;  Surgeon: John Prado III, MD;  Location: [facility];  Service: ENT;;    BREAST CYST ASPIRATION       SECTION      X 1    CHOLECYSTECTOMY      COLONOSCOPY      COLONOSCOPY N/A  1/14/2019    Procedure: COLONOSCOPY;  Surgeon: Jagruti Sweeney MD;  Location: [facility] (2ND FLR);  Service: Endoscopy;  Laterality: N/A;  BMI is 63/gastroparesis/3 days full liquid diet 1 day clear liquid see telephone encounter by Ciara Pinto [facility]    EPIDURAL STEROID INJECTION N/A 9/11/2020    Procedure: INJECTION, STEROID, EPIDURAL, L5-S1 IL;  Surgeon: Lawrence Marin MD;  Location: [facility]IN [facility];  Service: Pain Management;  Laterality: N/A;    ESOPHAGEAL MANOMETRY WITH MEASUREMENT OF IMPEDANCE N/A 11/5/2019    Procedure: MANOMETRY-ESOPHAGEAL-WITH IMPEDANCE;  Surgeon: Jagruti Sweeney MD;  Location: [facility] ([facility] FLR);  Service: Endoscopy;  Laterality: N/A;  Hold Narcotics x 1 days   Hold TCA x 1 days  Propofol only. No fentanyl or benzodiazepine during sedation. If additional sedation needed, discuss with Dr. Sweeney.  10/29 - pt confirmed appt    ESOPHAGOGASTRODUODENOSCOPY N/A 1/14/2019    Procedure: EGD (ESOPHAGOGASTRODUODENOSCOPY);  Surgeon: Jagruti Sweeney MD;  Location: [facility] ([address] [place] [address]);  Service: Endoscopy;  Laterality: N/A;  BMI is 63/gastroparesis/3 days full liquid diet 1 day clear liquid see telephone encounter by Ciara Pinto [facility]    ESOPHAGOGASTRODUODENOSCOPY N/A 11/5/2019    Procedure: ESOPHAGOGASTRODUODENOSCOPY (EGD);  Surgeon: Jagruti Sweeney MD;  Location: [facility] (2ND FLR);  Service: Endoscopy;  Laterality: N/A;  EGD with EndoFlip /+/- Botox  2nd floor for gastroparesis/BMI 63 **367lbs**  Bariatric Stretcher needed  Manometry probe to be placed endoscopically during EGD procedure  Due to change in protocol, Full liquid diet for 3 days/ 1 day of clear liquids  Hi    ESOPHAGOGASTRODUODENOSCOPY N/A 12/14/2020    Procedure: ESOPHAGOGASTRODUODENOSCOPY (EGD) with BRAVO;  Surgeon: Jagruti Sweeney MD;  Location: [facility] (2ND FLR);  Service: Endoscopy;  Laterality: N/A;  with Dilation  2nd floor due to BMI 56.20 (327 lbs) and gastroparesis  3 days full liquid diet and 1 day clears     ESOPHAGOGASTRODUODENOSCOPY N/A 4/15/2021    Procedure: ESOPHAGOGASTRODUODENOSCOPY (EGD);  Surgeon: Jagruti Sweeney MD;  Location: [facility] (2ND FLR);  Service: Endoscopy;  Laterality: N/A;  Endoflip  2nd floor-gastroparesis, BMI 57.18, bariatric stretcher  full liquid diet x3 days, clear liquid diet x1 day prior to procedure  propofol only  covid test 4/12 primary care, instructions emailed-[facility]    FOOT FRACTURE SURGERY Left     FUNCTIONAL ENDOSCOPIC SINUS SURGERY (FESS) USING COMPUTER-ASSISTED NAVIGATION Bilateral 10/22/2021    Procedure: FESS, USING COMPUTER-ASSISTED NAVIGATION;  Surgeon: John Prado III, MD;  Location: [facility];  Service: ENT;  Laterality: Bilateral;  FOLLOW DR PRADO ANESTHESIA PROTOCAL / pt will stay 23 hour post surgery for SHARATH observation    HYSTEROSCOPY WITH DILATION AND CURETTAGE OF UTERUS N/A 10/16/2018    K[facility]    INJECTION OF ANESTHETIC AGENT AROUND NERVE Bilateral 10/23/2020    Procedure: BLOCK, NERVE  bilateral L3,4,5 MBB;  Surgeon: Lawrence Marin MD;  Location: [facility] PAIN [facility]GT;  Service: Pain Management;  Laterality: Bilateral;  bilateral L3,4,5 MBB   consent needed    INJECTION OF ANESTHETIC AGENT AROUND NERVE Bilateral 2/18/2022    Procedure: BLOCK, NERVE, L3-L4-L5 MEDIAL BRANCH;  Surgeon: Lawrence Marin MD;  Location: [facility] PAIN [facility]GT;  Service: Pain Management;  Laterality: Bilateral;    PLANTAR FASCIOTOMY Left 11/18/2019    Procedure: FASCIOTOMY, PLANTAR;  Surgeon: Valentino Orourke DPM;  Location: [facility] 2ND FLR;  Service: Podiatry;  Laterality: Left;    RETINAL LASER PROCEDURE Left     SINUS SURGERY      SPHENOIDECTOMY Bilateral 10/22/2021    Procedure: SPHENOIDECTOMY;  Surgeon: John Prado III, MD;  Location: [facility] OR;  Service: ENT;  Laterality: Bilateral;    TRANSFORAMINAL EPIDURAL INJECTION OF STEROID Bilateral 6/24/2020    Procedure: INJECTION, STEROID, EPIDURAL, TRANSFORAMINAL APPROACH;  Surgeon: Lawrence Marin MD;  Location: [facility]IN [facility]GT;  Service:  Pain Management;  Laterality: Bilateral;    TRANSFORAMINAL EPIDURAL INJECTION OF STEROID Bilateral 1/28/2022    Procedure: INJECTION, STEROID, EPIDURAL, TRANSFORAMINAL APPROACH  Bilateral TFESI L4/L5      NEEDS CONSENT;  Surgeon: Lawrence Marin MD;  Location: [facility];  Service: Pain Management;  Laterality: Bilateral;    UPPER GASTROINTESTINAL ENDOSCOPY         Family History  Family History   Problem Relation Age of Onset    Hypertension Mother     Diabetes Mother     Colon cancer Mother 50    Colon polyps Mother     Cataracts Mother     Heart disease Father     COPD Father     Heart attack Father     Hypertension Father     Ulcers Father     Cataracts Father     Glaucoma Father     Cancer Maternal Grandmother     Breast cancer Maternal Grandmother     Colon cancer Maternal Grandmother     Colon polyps Maternal Grandmother     No Known Problems Paternal Grandmother     No Known Problems Brother     No Known Problems Maternal Aunt     No Known Problems Maternal Uncle     No Known Problems Paternal Aunt     No Known Problems Paternal Uncle     No Known Problems Maternal Grandfather     No Known Problems Paternal Grandfather     No Known Problems Daughter     No Known Problems Son     No Known Problems Daughter     No Known Problems Daughter     Celiac disease Neg Hx     Cirrhosis Neg Hx     Crohn's disease Neg Hx     Cystic fibrosis Neg Hx     Esophageal cancer Neg Hx     Hemochromatosis Neg Hx     Inflammatory bowel disease Neg Hx     Irritable bowel syndrome Neg Hx     Liver cancer Neg Hx     Liver disease Neg Hx     Rectal cancer Neg Hx     Stomach cancer Neg Hx     Ulcerative colitis Neg Hx     Jonnie's disease Neg Hx     Tuberculosis Neg Hx     Lymphoma Neg Hx     Scleroderma Neg Hx     Rheum arthritis Neg Hx     Melanoma Neg Hx     Multiple sclerosis Neg Hx     Psoriasis Neg Hx     Lupus Neg Hx     Skin cancer Neg Hx     Amblyopia Neg Hx      Blindness Neg Hx     Macular degeneration Neg Hx     Retinal detachment Neg Hx     Strabismus Neg Hx     Stroke Neg Hx     Thyroid disease Neg Hx     Allergic rhinitis Neg Hx     Allergies Neg Hx     Angioedema Neg Hx     Asthma Neg Hx     Eczema Neg Hx     Immunodeficiency Neg Hx     Rhinitis Neg Hx     Urticaria Neg Hx     Atopy Neg Hx        Social History  Social History     Socioeconomic History    Marital status:    Tobacco Use    Smoking status: Current Every Day Smoker     Packs/day: 1.00     Years: 27.00     Pack years: 27.00     Types: Cigarettes     Start date: 1/1/1992    Smokeless tobacco: Never Used    Tobacco comment: 1/2 a pack if her days are good   Substance and Sexual Activity    Alcohol use: Not Currently     Alcohol/week: 0.0 standard drinks     Comment: socially    Drug use: No    Sexual activity: Yes     Partners: Male     Birth control/protection: None     Comment:      Social Determinants of Health     Financial Resource Strain: Medium Risk    Difficulty of Paying Living Expenses: Somewhat hard   Food Insecurity: Food Insecurity Present    Worried About Running Out of Food in the Last Year: Sometimes true    Ran Out of Food in the Last Year: Sometimes true   Transportation Needs: No Transportation Needs    Lack of Transportation (Medical): No    Lack of Transportation (Non-Medical): No   Physical Activity: Inactive    Days of Exercise per Week: 0 days    Minutes of Exercise per Session: 0 min   Stress: Stress Concern Present    Feeling of Stress : To some extent   Social Connections: Unknown    Frequency of Communication with Friends and Family: Never    Frequency of Social Gatherings with Friends and Family: Never    Active Member of Clubs or Organizations: Yes    Attends Club or Organization Meetings: More than 4 times per year    Marital Status:    Housing Stability: High Risk    Unable to Pay for Housing in the Last Year: Yes     Number of Places Lived in the Last Year: 2    Unstable Housing in the Last Year: Yes       Current Medications  Current Outpatient Medications on File Prior to Visit   Medication Sig Dispense Refill    albuterol (PROVENTIL) 2.5 mg /3 mL (0.083 %) nebulizer solution Take 3 mLs (2.5 mg total) by nebulization every 6 (six) hours as needed for Wheezing. Rescue 3 mL 11    albuterol (PROVENTIL/VENTOLIN HFA) 90 mcg/actuation inhaler Inhale 2 puffs into the lungs every 6 (six) hours as needed for Wheezing or Shortness of Breath. Rescue 54 g 6    alcohol swabs (BD ALCOHOL SWABS) PadM Apply 1 each topically 2 (two) times a day. 200 each 4    allerg xt,D.farinae-D.pteronys (ODACTRA) 12 SQ-HDM Subl Place 1 tablet under the tongue once daily. 30 tablet 11    ammonium lactate 12 % Crea Apply twice daily to affected parts both feet as needed. 140 g 11    azelastine (ASTELIN) 137 mcg (0.1 %) nasal spray 1 spray (137 mcg total) by Nasal route 2 (two) times daily. 30 mL 11    blood sugar diagnostic Strp 1 each by Misc.(Non-Drug; Combo Route) route 4 (four) times daily before meals and nightly. ACCU CHEK ELVIA PLUS METER 200 each 3    blood-glucose meter (ACCU-CHEK ELVIA PLUS METER) Misc TEST FOUR TIMES DAILY BEFORE MEALS AND EVERY NIGHT 90 each 1    budesonide-formoterol 160-4.5 mcg (SYMBICORT) 160-4.5 mcg/actuation HFAA Inhale 2 puffs into the lungs every 12 (twelve) hours. Controller 10.2 g 11    butalbital-acetaminophen-caffeine -40 mg (FIORICET, ESGIC) -40 mg per tablet Take 1 tablet by mouth every 4 (four) hours as needed for Pain. 12 tablet 0    cetirizine (ZYRTEC) 10 MG tablet Take 1 tablet (10 mg total) by mouth daily as needed for Allergies (itching). 30 tablet 0    cyclobenzaprine (FLEXERIL) 10 MG tablet Take 10 mg by mouth 3 (three) times daily.      diclofenac sodium (VOLTAREN) 1 % Gel Apply 2 g topically 2 (two) times daily. 100 g 2    estradioL (ESTRACE) 0.01 % (0.1 mg/gram) vaginal cream Use  1 gram of estrogen cream in vagina nightly x 2 weeks, then twice a week thereafter. 42.5 g 3    fluconazole (DIFLUCAN) 150 MG Tab Take one pill every other day x 3 doses, then once weekly x 3 weeks 6 tablet 0    fluticasone propionate (FLONASE) 50 mcg/actuation nasal spray 2 sprays (100 mcg total) by Each Nostril route 2 (two) times a day. 16 g 11    JARDIANCE 10 mg tablet TAKE 1 TABLET(10 MG) BY MOUTH EVERY DAY 30 tablet 1    lancets (ACCU-CHEK FASTCLIX LANCET DRUM) Misc 1 Device by Misc.(Non-Drug; Combo Route) route 2 (two) times a day. 200 each 4    lancets (ACCU-CHEK SOFTCLIX LANCETS) Misc 1 Device by Misc.(Non-Drug; Combo Route) route 4 (four) times daily. 200 each 3    levocetirizine (XYZAL) 5 MG tablet Take 1 tablet (5 mg total) by mouth every evening. 90 tablet 3    lidocaine (LIDODERM) 5 % Place 1 patch onto the skin once daily. Remove & Discard patch within 12 hours or as directed by MD 15 patch 0    LIDOcaine HCL 2% (XYLOCAINE) 2 % jelly Apply topically as needed. Apply topically once nightly to affected part of foot/feet. 30 mL 2    losartan (COZAAR) 100 MG tablet Take 1 tablet (100 mg total) by mouth once daily. 90 tablet 0    montelukast (SINGULAIR) 10 mg tablet Take 1 tablet (10 mg total) by mouth every evening. 30 tablet 11    MOVANTIK 12.5 mg Tab Take 1 tablet by mouth once daily.      nicotine (NICODERM CQ) 21 mg/24 hr Place 1 patch onto the skin once daily. 28 patch 0    nicotine, polacrilex, (NICORETTE) 2 mg Gum Take 1 each (2 mg total) by mouth as needed (Take 1 piece as needed.  Maximum of 10 per day.). 220 each 0    nystatin-triamcinolone (MYCOLOG) ointment Apply topically 2 (two) times daily. 60 g 2    ondansetron (ZOFRAN) 8 MG tablet Take 1 tablet (8 mg total) by mouth every 8 (eight) hours as needed for Nausea. 90 tablet 11    oxyCODONE-acetaminophen (PERCOCET) 7.5-325 mg per tablet Take 1 tablet by mouth 3 (three) times daily as needed for Pain. 90 tablet 0    OZEMPIC 1  mg/dose (4 mg/3 mL) Inject 1 mg into the skin every 7 days. 3 pen 0    pantoprazole (PROTONIX) 40 MG tablet TAKE 1 TABLET(40 MG) BY MOUTH TWICE DAILY 60 tablet 6    QUEtiapine (SEROQUEL) 25 MG Tab Take 1 tablet (25 mg total) by mouth once daily. 90 tablet 0    sodium chloride (SALINE NASAL) 0.65 % nasal spray 2 sprays by Nasal route as needed for Congestion. 104 mL 12    TROKENDI  mg Cp24       [DISCONTINUED] venlafaxine (EFFEXOR-XR) 75 MG 24 hr capsule Take 1 capsule (75 mg total) by mouth once daily. NO FURTHER REFILL WITHOUT APT 90 capsule 0    EPINEPHrine (EPIPEN) 0.3 mg/0.3 mL AtIn Inject 0.3 mLs (0.3 mg total) into the muscle once. for 1 dose 2 each 1    pregabalin (LYRICA) 50 MG capsule Take 1 capsule (50 mg total) by mouth 3 (three) times daily. 90 capsule 3    valACYclovir (VALTREX) 1000 MG tablet Take 1 tablet (1,000 mg total) by mouth 2 (two) times daily. for 7 days 14 tablet 0    [DISCONTINUED] dexamethasone sodium phosp/PF (DEXAMETHASONE SODIUM PHOS, PF,) 10 mg/mL Soln       [DISCONTINUED] fentaNYL (SUBLIMAZE) 50 mcg/mL injection       [DISCONTINUED] midazolam, PF, (VERSED) 1 mg/mL Soln injection       [DISCONTINUED] OMNIPAQUE 300 300 mg iodine/mL injection        Current Facility-Administered Medications on File Prior to Visit   Medication Dose Route Frequency Provider Last Rate Last Admin    acetaminophen tablet 650 mg  650 mg Oral Once PRN Carlyle Gordillo MD        albuterol inhaler 2 puff  2 puff Inhalation Q20 Min PRN Carlyle Gordillo MD        diphenhydrAMINE injection 25 mg  25 mg Intravenous Once PRN Carlyle Gordillo MD        EPINEPHrine (EPIPEN) 0.3 mg/0.3 mL pen injection 0.3 mg  0.3 mg Intramuscular PRN Carlyle Gordillo MD        methylPREDNISolone sodium succinate injection 40 mg  40 mg Intravenous Once PRN Carlyle Gordillo MD        ondansetron disintegrating tablet 4 mg  4 mg Oral Once PRN Carlyle Gordillo MD        sodium chloride 0.9% 500 mL flush bag    "Intravenous PRN Carlyle Gordillo MD        sodium chloride 0.9% flush 10 mL  10 mL Intravenous PRN Carlyle Gordillo MD           Allergies   Review of patient's allergies indicates:   Allergen Reactions    Gabapentin Other (See Comments)     Makes her twitch       Review of Systems (Pertinent positives)  Review of Systems   Constitutional: Negative.    HENT: Negative.    Eyes: Negative.    Respiratory: Negative.    Cardiovascular: Negative.    Gastrointestinal: Negative.    Musculoskeletal: Positive for joint pain and myalgias.   Skin: Negative.          /82   Pulse 96   Temp 98.4 °F (36.9 °C) (Oral)   Resp 18   Ht 5' 4" (1.626 m)   Wt (!) 164.6 kg (362 lb 14 oz)   LMP 08/17/2019   SpO2 96%   BMI 62.29 kg/m²     GENERAL APPEARANCE: in no apparent distress and well developed and well nourished  HEENT: PERRL, EOMI, Sclera clear, anicteric, Oropharynx clear, no lesions, Neck supple with midline trachea  NECK: normal, supple, no adenopathy, thyroid normal in size  RESPIRATORY: appears well, vitals normal, no respiratory distress, acyanotic, normal RR, chest clear, no wheezing, crepitations, rhonchi, normal symmetric air entry  HEART: regular rate and rhythm, S1, S2 normal, no murmur, click, rub or gallop.    ABDOMEN: abdomen is soft without tenderness, no masses, no hernias, no organomegaly, no rebound, no guarding. Suprapubic tenderness absent. No CVA tenderness.  SKIN: no rashes, no wounds, no other lesions  PSYCH: Alert, oriented x 3, thought content appropriate, speech normal, pleasant and cooperative, good eye contact, well groomed    Assessment/Plan:  Yanni Kaiser is a 51 y.o. female who presents today for :    Yanni was seen today for hypertension and diabetes.    Diagnoses and all orders for this visit:    Recurrent major depressive disorder, in full remission  -     venlafaxine (EFFEXOR-XR) 75 MG 24 hr capsule; Take 1 capsule (75 mg total) by mouth once daily. NO FURTHER REFILL " WITHOUT APT    Migraine without aura and without status migrainosus, not intractable  -     venlafaxine (EFFEXOR-XR) 75 MG 24 hr capsule; Take 1 capsule (75 mg total) by mouth once daily. NO FURTHER REFILL WITHOUT APT    Diabetes mellitus with peripheral circulatory disorder    Chronic obstructive pulmonary disease, unspecified COPD type    Nonintractable epilepsy without status epilepticus, unspecified epilepsy type    1.  Refilled effexor   2.  Patient has resumed jardiance and ozempic  3.  Recheck a1c in June  4.  Call with any concerns  5.  COPD stable at this time, but still smokes  6.  Neurology managing epilepsy  7.  Pain management managing chronic pain        Carlyle Gordillo MD

## 2022-04-04 NOTE — PROGRESS NOTES
Left via ambulance @ 12:42 pm   Meds-to-Beds: Discharge prescription orders listed below delivered to patient's bedside by pharmacy technician Tammy. RN notified. Patient counseled by pharmacist Aleks.     Current Outpatient Medications   Medication Sig Dispense Refill   • oxyCODONE (ROXICODONE) 5 MG/5ML solution Take 10 mL (10 mg) every 4 hours as needed for pain for up to 3 days 180 mL 0   • LORazepam (ATIVAN) 2 MG/ML Conc Give 1/2 ML (1mg) orally every hour as needed for comfort care/anxiety F41.9 30 mL 0      Sunita Ignacio, PharmD

## 2022-04-05 ENCOUNTER — DOCUMENTATION ONLY (OUTPATIENT)
Dept: BARIATRICS | Facility: CLINIC | Age: 52
End: 2022-04-05
Payer: MEDICARE

## 2022-04-05 NOTE — PROGRESS NOTES
"Subjective:       Patient ID: Yanni Kaiser is a 51 y.o. female.    Chief Complaint: Follow-up    Pt here today for follow-up. Has gained 27 lbs since I saw her in June 2021, net neg 14 lbs. Started LCHF diet and ozempic.States her appetite is still variable. State she is not eating much starch. Has given up sweet tea. Denies SE with ozempic 1 mg. Also on Jardiance for DM. BP is high today.   She has been more active.   Has not been on trokendi for migraines because the pill is too large. Appears she should be on 600 mg a day with Herb Fry MD, but pt is not on it either.   States she is avoiding fried foods. Eating more baked chicken, shrimp, fish, pasta or salad.     Lab Results   Component Value Date    HGBA1C 8.7 (H) 03/30/2022    HGBA1C 5.6 08/26/2021    HGBA1C 5.5 04/14/2021     Lab Results   Component Value Date    MICROALBUR 7.2 04/01/2014    LDLCALC 80.6 04/14/2021    CREATININE 1.3 11/10/2021     Initial inbody 4/6/22.  BMR: 1918  PBF: 55.9%    Follow-up  Associated symptoms include arthralgias and chest pain. Pertinent negatives include no chills or fever.     Review of Systems   Constitutional: Negative for chills and fever.   Respiratory: Positive for apnea and shortness of breath.         Does not use CPAP machine. States did not like it.    Cardiovascular: Positive for chest pain and leg swelling.        With asthma sx   Gastrointestinal: Positive for constipation and diarrhea.        + GERD   Endocrine: Positive for cold intolerance.   Genitourinary: Negative for difficulty urinating and dysuria.           Musculoskeletal: Positive for arthralgias and back pain.   Neurological: Positive for dizziness and light-headedness.   Psychiatric/Behavioral: Positive for dysphoric mood. The patient is not nervous/anxious.        Objective:     BP (!) 157/99   Pulse 88   Ht 5' 4" (1.626 m)   Wt (!) 162.5 kg (358 lb 3.2 oz)   LMP 08/17/2019   SpO2 100%   BMI 61.48 kg/m²     Physical " Exam  Vitals reviewed.   Constitutional:       General: She is not in acute distress.     Appearance: She is well-developed.      Comments: Morbidly obese     HENT:      Head: Normocephalic and atraumatic.   Eyes:      General: No scleral icterus.     Pupils: Pupils are equal, round, and reactive to light.   Cardiovascular:      Rate and Rhythm: Normal rate.   Pulmonary:      Effort: Pulmonary effort is normal.   Musculoskeletal:         General: Normal range of motion.      Cervical back: Normal range of motion and neck supple.   Skin:     General: Skin is warm and dry.      Findings: No erythema.   Neurological:      Mental Status: She is alert and oriented to person, place, and time.      Cranial Nerves: No cranial nerve deficit.   Psychiatric:         Behavior: Behavior normal.         Judgment: Judgment normal.         Assessment:       1. Class 3 severe obesity due to excess calories with serious comorbidity and body mass index (BMI) of 60.0 to 69.9 in adult    2. Type 2 diabetes mellitus with stage 3a chronic kidney disease, with long-term current use of insulin    3. Tobacco abuse        Plan:         Yanni was seen today for follow-up.    Diagnoses and all orders for this visit:    Class 3 severe obesity due to excess calories with serious comorbidity and body mass index (BMI) of 60.0 to 69.9 in adult    Type 2 diabetes mellitus with stage 3a chronic kidney disease, with long-term current use of insulin  -     OZEMPIC 1 mg/dose (4 mg/3 mL); Inject 1 mg into the skin every 7 days.    Tobacco abuse  -     Ambulatory referral/consult to Smoking Cessation Program; Future    Other orders  -     topiramate (TOPAMAX) 50 MG tablet; Take 1 tablet (50 mg total) by mouth 2 (two) times daily.         Patient was informed that topiramate is used for migraine prevention and seizures. Weight loss is a common side effect that is well documented. S/he understands this. S/he was informed of the potential side effects such  as serious and possibly fatal rash in which case the medication should be discontinued immediately. Paresthesias, forgetfulness, fatigue, kidney stones, GI symptoms, and changes in lab values such as electrolytes, blood counts and kidney function.    Start topiramate 50 mg morning and evening.     Continue with Ozempic 1mg once a week, hopefully able       Decrease portions as soon as you start Ozempic. Some nausea in the first 2 weeks is not unusual.     If you get pain across the upper abdomen and around to your back, please call the office.     Add some type of resistance training 2-3 days a week. These can be body weight exercises, light weight or elastic bands. [employer] and [employer] are great sources for free work out plans and videos.     1500 rachid pb meal planner given.

## 2022-04-05 NOTE — PROGRESS NOTES
Spoke with patient, she would like to resume workup for bariatric surgery. A1C is currently 8.8 and will recheck in 3 mos, pt also aware she must be nicotine free. Pt will keep appointment with Dr. Salcedo tomorrow 4/6 and let us know when A1C below 8.5 and nicotine free to resume bariatric surgery workup. Updated bariatric dashboard with 2022 insurance information found in guarantor notes.

## 2022-04-06 ENCOUNTER — OFFICE VISIT (OUTPATIENT)
Dept: BARIATRICS | Facility: CLINIC | Age: 52
End: 2022-04-06
Payer: MEDICARE

## 2022-04-06 VITALS
DIASTOLIC BLOOD PRESSURE: 99 MMHG | WEIGHT: 293 LBS | SYSTOLIC BLOOD PRESSURE: 157 MMHG | HEART RATE: 88 BPM | OXYGEN SATURATION: 100 % | HEIGHT: 64 IN | BODY MASS INDEX: 50.02 KG/M2

## 2022-04-06 DIAGNOSIS — Z79.4 TYPE 2 DIABETES MELLITUS WITH STAGE 3A CHRONIC KIDNEY DISEASE, WITH LONG-TERM CURRENT USE OF INSULIN: ICD-10-CM

## 2022-04-06 DIAGNOSIS — E66.01 CLASS 3 SEVERE OBESITY DUE TO EXCESS CALORIES WITH SERIOUS COMORBIDITY AND BODY MASS INDEX (BMI) OF 60.0 TO 69.9 IN ADULT: ICD-10-CM

## 2022-04-06 DIAGNOSIS — Z72.0 TOBACCO ABUSE: ICD-10-CM

## 2022-04-06 DIAGNOSIS — N18.31 TYPE 2 DIABETES MELLITUS WITH STAGE 3A CHRONIC KIDNEY DISEASE, WITH LONG-TERM CURRENT USE OF INSULIN: ICD-10-CM

## 2022-04-06 DIAGNOSIS — E11.22 TYPE 2 DIABETES MELLITUS WITH STAGE 3A CHRONIC KIDNEY DISEASE, WITH LONG-TERM CURRENT USE OF INSULIN: ICD-10-CM

## 2022-04-06 PROCEDURE — 4010F PR ACE/ARB THEARPY RXD/TAKEN: ICD-10-PCS | Mod: CPTII,S$GLB,, | Performed by: INTERNAL MEDICINE

## 2022-04-06 PROCEDURE — 3052F PR MOST RECENT HEMOGLOBIN A1C LEVEL 8.0 - < 9.0%: ICD-10-PCS | Mod: CPTII,S$GLB,, | Performed by: INTERNAL MEDICINE

## 2022-04-06 PROCEDURE — 3080F DIAST BP >= 90 MM HG: CPT | Mod: CPTII,S$GLB,, | Performed by: INTERNAL MEDICINE

## 2022-04-06 PROCEDURE — 99215 PR OFFICE/OUTPT VISIT, EST, LEVL V, 40-54 MIN: ICD-10-PCS | Mod: S$GLB,,, | Performed by: INTERNAL MEDICINE

## 2022-04-06 PROCEDURE — 1160F PR REVIEW ALL MEDS BY PRESCRIBER/CLIN PHARMACIST DOCUMENTED: ICD-10-PCS | Mod: CPTII,S$GLB,, | Performed by: INTERNAL MEDICINE

## 2022-04-06 PROCEDURE — 3008F PR BODY MASS INDEX (BMI) DOCUMENTED: ICD-10-PCS | Mod: CPTII,S$GLB,, | Performed by: INTERNAL MEDICINE

## 2022-04-06 PROCEDURE — 99499 UNLISTED E&M SERVICE: CPT | Mod: S$GLB,,, | Performed by: INTERNAL MEDICINE

## 2022-04-06 PROCEDURE — 1159F MED LIST DOCD IN RCRD: CPT | Mod: CPTII,S$GLB,, | Performed by: INTERNAL MEDICINE

## 2022-04-06 PROCEDURE — 1160F RVW MEDS BY RX/DR IN RCRD: CPT | Mod: CPTII,S$GLB,, | Performed by: INTERNAL MEDICINE

## 2022-04-06 PROCEDURE — 3077F SYST BP >= 140 MM HG: CPT | Mod: CPTII,S$GLB,, | Performed by: INTERNAL MEDICINE

## 2022-04-06 PROCEDURE — 99999 PR PBB SHADOW E&M-EST. PATIENT-LVL V: ICD-10-PCS | Mod: PBBFAC,,, | Performed by: INTERNAL MEDICINE

## 2022-04-06 PROCEDURE — 99499 RISK ADDL DX/OHS AUDIT: ICD-10-PCS | Mod: S$GLB,,, | Performed by: INTERNAL MEDICINE

## 2022-04-06 PROCEDURE — 3008F BODY MASS INDEX DOCD: CPT | Mod: CPTII,S$GLB,, | Performed by: INTERNAL MEDICINE

## 2022-04-06 PROCEDURE — 1159F PR MEDICATION LIST DOCUMENTED IN MEDICAL RECORD: ICD-10-PCS | Mod: CPTII,S$GLB,, | Performed by: INTERNAL MEDICINE

## 2022-04-06 PROCEDURE — 3080F PR MOST RECENT DIASTOLIC BLOOD PRESSURE >= 90 MM HG: ICD-10-PCS | Mod: CPTII,S$GLB,, | Performed by: INTERNAL MEDICINE

## 2022-04-06 PROCEDURE — 4010F ACE/ARB THERAPY RXD/TAKEN: CPT | Mod: CPTII,S$GLB,, | Performed by: INTERNAL MEDICINE

## 2022-04-06 PROCEDURE — 99999 PR PBB SHADOW E&M-EST. PATIENT-LVL V: CPT | Mod: PBBFAC,,, | Performed by: INTERNAL MEDICINE

## 2022-04-06 PROCEDURE — 3077F PR MOST RECENT SYSTOLIC BLOOD PRESSURE >= 140 MM HG: ICD-10-PCS | Mod: CPTII,S$GLB,, | Performed by: INTERNAL MEDICINE

## 2022-04-06 PROCEDURE — 99215 OFFICE O/P EST HI 40 MIN: CPT | Mod: S$GLB,,, | Performed by: INTERNAL MEDICINE

## 2022-04-06 PROCEDURE — 3052F HG A1C>EQUAL 8.0%<EQUAL 9.0%: CPT | Mod: CPTII,S$GLB,, | Performed by: INTERNAL MEDICINE

## 2022-04-06 RX ORDER — TOPIRAMATE 50 MG/1
50 TABLET, FILM COATED ORAL 2 TIMES DAILY
Qty: 180 TABLET | Refills: 0 | Status: SHIPPED | OUTPATIENT
Start: 2022-04-06 | End: 2022-04-13 | Stop reason: SDUPTHER

## 2022-04-06 RX ORDER — SEMAGLUTIDE 1.34 MG/ML
1 INJECTION, SOLUTION SUBCUTANEOUS
Qty: 3 PEN | Refills: 0 | Status: SHIPPED | OUTPATIENT
Start: 2022-04-06 | End: 2022-07-06

## 2022-04-06 NOTE — PATIENT INSTRUCTIONS
"Patient was informed that topiramate is used for migraine prevention and seizures. Weight loss is a common side effect that is well documented. S/he understands this. S/he was informed of the potential side effects such as serious and possibly fatal rash in which case the medication should be discontinued immediately. Paresthesias, forgetfulness, fatigue, kidney stones, GI symptoms, and changes in lab values such as electrolytes, blood counts and kidney function.    Start topiramate 50 mg morning and evening.     Continue with Ozempic 1mg once a week.      Decrease portions as soon as you start Ozempic. Some nausea in the first 2 weeks is not unusual.     If you get pain across the upper abdomen and around to your back, please call the office.     Add some type of resistance training 2-3 days a week. These can be body weight exercises, light weight or elastic bands. [employer] and [employer] are great sources for free work out plans and videos.           1500 calorie  Meal Plan  STARCHES 80 CALORIES PER SERVING 15g CARB, 3g PROTEIN, 1g FAT  Servings per day   bread   tortilla   crackers   cooked cereals   dry cereals   pasta   rice   corn   popcorn   potato (small)   potato, mashed   sweet potato   squash, winter   cooked beans, peas, lentils (add 1 meat exchange)   1 slice   1 (6")   4-6 (3/4 oz)   1/2 cup   3/4 cup   1/2 cup   1/3 cup  1/2 cup   1 small light bag  1 (3 oz)   1/2 cup   1/3 cup   1 cup   1/2 cup Most starches are a good source of B vitamins   Choose whole grain foods such as 100% whole wheat bread and flour, brown rice, tortillas, etc. for nutrients and fiber.   Combine beans (starch & meat) with grains (starch) for their complimentary proteins and fiber   Combine grains (starch) with milk (milk) or cheese (meat) to compliment proteins     5   FRUIT 60 CALORIES PER SERVING 15g CARB    fresh fruit   banana  melon (cubes)   berries  canned fruit   dried fruit    1 small   1/2  ½ cup   ¾ cup  ½ cup   ¼ cup "    Choose whole fruits for fiber   No fruit juices   2   DAIRY  CALORIES PER SERVING 12g CARB, 8g PROTEIN, 0-8g FAT    milk   yogurt  Protein soy or almond milk 1 cup   1 cup   1 cup Use unsweetened almond or soy milk with added protein  Avoid chocolate or flavored milk  Avoid yogurt with more than 8 gms of sugar. Gms of protein should be higher than grams of sugar               2   MEAT AND SUBSTITUES  CALORIES PER SERVING 7g Protein, 0-13g FAT    Meat,Seafood and fish   cheese   cottage cheese   egg   peanut butter   tofu or tempeh  cooked beans, peas, lentils (add 1 starch)  Quinoa (add 1 starch)   Nuts and seeds (½ serving protein + 1 fat)  Nutritional yeast  [employer] grillers 1 oz     1 oz  1/4 cup     1   1.5 Tbsp   4 oz (1/2 cup)   1/2 cup              ¼ cup            2 tablespoons    1 amanda or ½ cup      Choose fish, seafood and lower fat cheeses  Limit frying or adding fat.      11   FATS 45 CALORIES PER SERVING     oil   mayonnaise   cream cheese   salad dressing   peanuts   avocado   butter or margarine    1 tsp   1 tsp   1 Tbsp   1 Tbsp   10   1/8   1 tsp Eat less fat.   Eat less saturated fat such as animal fat found in fatter meat, cheese, and butter. Also eat less hydrogenated fat.      2-3   VEGETABLES 25 CALORIES PER SERVING 5 g CARB, 2g PROTEIN    raw vegetables   cooked vegetables   tomato or vegetable juice 1 cup   1/2 cup     1/2 cup Choose dark green leafy and deep yellow vegetables such as spinach, zuchinni, squash, mushrooms, cauliflower ,broccoli, carrots, and peppers.   Unlimited       1500 CALORIE MEAL PLAN  Eat 3 small meals per day with 1-2 small snacks to keep you full throughout the day  Aim for at least 15 gms of protein at breakfast, at least 25 grams at lunch and at least 25 grams of protein at dinner.  Snacks should contain at least 5 grams of protein  Limit starches to 1 serving per meal or snack.  Keep your carbs whole grain or whole wheat  Limit your intake of  refined sugar including sugary beverages ie sweet tea, lemonade, fruit punch             Fruit Protein Dairy Starch Fats Calories   Breakfast 1 2 1 1  340   Lunch  3  2 1 370   Dinner 1 4  1 2 450   Snack  2 1 1  360   Total 2 11 2 5 3 1505     Sample breakfasts:  2 scrambled eggs (use spray), 1 cup Protein Silk soy milk, 1 slice whole wheat toast, 1 small orange  Omelet made with 1 egg, 1-ounce low fat cheese and non-starchy veggies, 1 low fat plain yogurt with ½ banana  Plain yogurt with ¾ cup unsweetened cold cereal and ½ cup fresh fruit salad, 2 hardboiled eggs  Sample lunches:  ½ whole wheat bagel topped with 3 ounces of low fat cheese melted in oven with a wild green salad with 1 TBSP dressing  ¾ cup cooked beans with 6 whole grain crackers, 10 roasted peanuts  ½ medium baked potato with 3 ounces of low fat cheddar cheese and 1 TBSP  sour cream  1 small wheat tortilla brushed with 1 tsp olive oil then brushed with pizza sauce and 4 ounces low fat cheese, sliced mushrooms and green peppers broiled until cheese is melted.  ½ cup chopped melon    Sample Dinners:  4 ounces of tuna pan seared in 1 tsp olive oil, ½ baked small sweet potato and 2 small plums  ½ cup chick peas, 1 cup cauliflower sauteed with 1 tbsp chopped onion, 1 tsp oil, and 2 tsp oconnor powder. Add low sodium vegetable stock to desired consistency. Serve with ½ cup brown rice  Red beans seasoned with onions and bell peppers served over cauliflower rice  1 cup cooked green or brown lentils served with ½ cup cooked quinoa and chopped tomatoes and cucumbers and 1 tbsp feta cheese  Sample Snacks:  1 cup of Protein Silk Soy milk with 3 squares victor hugo crackers  3/4 ounce Triscuits with 1 ounce cubed cheese  Chobani Triple Zero yogurt with ¾ cup unsweetened cereal  ½ cup edamame (shelled)

## 2022-04-07 ENCOUNTER — PATIENT MESSAGE (OUTPATIENT)
Dept: BARIATRICS | Facility: CLINIC | Age: 52
End: 2022-04-07
Payer: MEDICARE

## 2022-04-11 ENCOUNTER — PATIENT MESSAGE (OUTPATIENT)
Dept: PAIN MEDICINE | Facility: CLINIC | Age: 52
End: 2022-04-11
Payer: MEDICARE

## 2022-04-12 ENCOUNTER — OFFICE VISIT (OUTPATIENT)
Dept: PAIN MEDICINE | Facility: CLINIC | Age: 52
End: 2022-04-12
Payer: MEDICARE

## 2022-04-12 ENCOUNTER — PATIENT MESSAGE (OUTPATIENT)
Dept: BARIATRICS | Facility: CLINIC | Age: 52
End: 2022-04-12
Payer: MEDICARE

## 2022-04-12 VITALS
DIASTOLIC BLOOD PRESSURE: 78 MMHG | HEART RATE: 75 BPM | SYSTOLIC BLOOD PRESSURE: 118 MMHG | HEIGHT: 64 IN | WEIGHT: 293 LBS | TEMPERATURE: 98 F | BODY MASS INDEX: 50.02 KG/M2 | RESPIRATION RATE: 18 BRPM

## 2022-04-12 DIAGNOSIS — M47.819 SPONDYLOSIS WITHOUT MYELOPATHY: ICD-10-CM

## 2022-04-12 DIAGNOSIS — M48.061 SPINAL STENOSIS OF LUMBAR REGION, UNSPECIFIED WHETHER NEUROGENIC CLAUDICATION PRESENT: ICD-10-CM

## 2022-04-12 DIAGNOSIS — M47.817 SPONDYLOSIS OF LUMBOSACRAL REGION, UNSPECIFIED SPINAL OSTEOARTHRITIS COMPLICATION STATUS: Primary | ICD-10-CM

## 2022-04-12 PROCEDURE — 3052F HG A1C>EQUAL 8.0%<EQUAL 9.0%: CPT | Mod: CPTII,S$GLB,, | Performed by: ANESTHESIOLOGY

## 2022-04-12 PROCEDURE — 3008F PR BODY MASS INDEX (BMI) DOCUMENTED: ICD-10-PCS | Mod: CPTII,S$GLB,, | Performed by: ANESTHESIOLOGY

## 2022-04-12 PROCEDURE — 4010F ACE/ARB THERAPY RXD/TAKEN: CPT | Mod: CPTII,S$GLB,, | Performed by: ANESTHESIOLOGY

## 2022-04-12 PROCEDURE — 3008F BODY MASS INDEX DOCD: CPT | Mod: CPTII,S$GLB,, | Performed by: ANESTHESIOLOGY

## 2022-04-12 PROCEDURE — 3074F PR MOST RECENT SYSTOLIC BLOOD PRESSURE < 130 MM HG: ICD-10-PCS | Mod: CPTII,S$GLB,, | Performed by: ANESTHESIOLOGY

## 2022-04-12 PROCEDURE — 99214 OFFICE O/P EST MOD 30 MIN: CPT | Mod: S$GLB,,, | Performed by: ANESTHESIOLOGY

## 2022-04-12 PROCEDURE — 3078F PR MOST RECENT DIASTOLIC BLOOD PRESSURE < 80 MM HG: ICD-10-PCS | Mod: CPTII,S$GLB,, | Performed by: ANESTHESIOLOGY

## 2022-04-12 PROCEDURE — 3052F PR MOST RECENT HEMOGLOBIN A1C LEVEL 8.0 - < 9.0%: ICD-10-PCS | Mod: CPTII,S$GLB,, | Performed by: ANESTHESIOLOGY

## 2022-04-12 PROCEDURE — 1159F PR MEDICATION LIST DOCUMENTED IN MEDICAL RECORD: ICD-10-PCS | Mod: CPTII,S$GLB,, | Performed by: ANESTHESIOLOGY

## 2022-04-12 PROCEDURE — 99214 PR OFFICE/OUTPT VISIT, EST, LEVL IV, 30-39 MIN: ICD-10-PCS | Mod: S$GLB,,, | Performed by: ANESTHESIOLOGY

## 2022-04-12 PROCEDURE — 1160F PR REVIEW ALL MEDS BY PRESCRIBER/CLIN PHARMACIST DOCUMENTED: ICD-10-PCS | Mod: CPTII,S$GLB,, | Performed by: ANESTHESIOLOGY

## 2022-04-12 PROCEDURE — 3078F DIAST BP <80 MM HG: CPT | Mod: CPTII,S$GLB,, | Performed by: ANESTHESIOLOGY

## 2022-04-12 PROCEDURE — 1159F MED LIST DOCD IN RCRD: CPT | Mod: CPTII,S$GLB,, | Performed by: ANESTHESIOLOGY

## 2022-04-12 PROCEDURE — 99999 PR PBB SHADOW E&M-EST. PATIENT-LVL V: ICD-10-PCS | Mod: PBBFAC,,, | Performed by: ANESTHESIOLOGY

## 2022-04-12 PROCEDURE — 3074F SYST BP LT 130 MM HG: CPT | Mod: CPTII,S$GLB,, | Performed by: ANESTHESIOLOGY

## 2022-04-12 PROCEDURE — 4010F PR ACE/ARB THEARPY RXD/TAKEN: ICD-10-PCS | Mod: CPTII,S$GLB,, | Performed by: ANESTHESIOLOGY

## 2022-04-12 PROCEDURE — 99999 PR PBB SHADOW E&M-EST. PATIENT-LVL V: CPT | Mod: PBBFAC,,, | Performed by: ANESTHESIOLOGY

## 2022-04-12 PROCEDURE — 1160F RVW MEDS BY RX/DR IN RCRD: CPT | Mod: CPTII,S$GLB,, | Performed by: ANESTHESIOLOGY

## 2022-04-12 RX ORDER — CYCLOBENZAPRINE HCL 10 MG
10 TABLET ORAL 3 TIMES DAILY
Qty: 90 TABLET | Refills: 3 | Status: SHIPPED | OUTPATIENT
Start: 2022-04-12 | End: 2022-05-12

## 2022-04-12 NOTE — PROGRESS NOTES
Chronic Pain- Established Note (Follow up visit)    SUBJECTIVE:    Interval History 4/12/2022: Patient presents to clinic today for follow up of chronic low back pain. Patient is s/p Bilateral L3, L4 and L5 Lumbar Medial Branch Block on 2/18/22.  She endorses 50% pain relief and improvement in function following the injection that lasted 1 day. Pain remains stable in low back and radiates down posterolateral LLE to the foot. She endorses chronic intermittent numbness/tingling in bilateral lower extremities. She feels some weakness in LLE that is chronic. Pain is sharp/dull/achy. Pain is currently rated 7/10. She continues to take lyrica 50mg BID, flexeril and percocet 7.5/325mg (uses it about twice a day prn). She was previously doing aquatherapy and stopped due to a yeast infection. She plans to start the [facility] and has an orientation tomorrow.     Interval Hx 2/14/22  Patient returns today in virtual follow up. She is s/p bilateral L4/5 TFESI 1/28/22 which she reports only gave her 2 days of relief and her pain returned. In the interim she has continued with aqua therapy and was seen in [facility]. They plan to continue PT once her aqua therapy is done. She reports continued low back pain, worse with prolonged standing and walking and better with rest. She reports she has more back pain than leg pain at this time. She also reports bilateral hip pain. She continues with Lyrica 50 mg BID Flexeril 190 gm TID and Percocet 7.5/325 daily prn. Denies any new numbness tingling weakness b/b incontinence.     Interval History 1/18/2022:  Yanni Kaiser returns to clinic for follow up of low back pain. She reports resolution dermotomal right sided mid back pain with dermatomal distribution suspicious for shingles despite not having a rash s/p valtrex 1000mg BID for 7 days. In regard to low back pain, she had a L4-L5 TFESI scheduled that was cancelled due to recent abx for chronic sinusitis. She continues to have chronic left  sided low back with with radiation down to the lateral thigh and into the anterior and posterior leg and into the plantar aspects of the feet.  She reports low back pain leg heaviness with walking that is releved with leaning on an object. Sitting relieves leg heaviness but not low back pain. Treats sx with topical voltaren gel, lyrica 50mg TID, and percocet 7.5-325 TID (#90 dispensed 1/17/22). Pain meds help but pain persists. She does report urge type incontinence as well as overflow type leakage between voids including overnight. She reports occasional significant LE weakness. Denies saddle anesthesia or ataxia.     Interval History 12/20/2021:  Yanni Kaiser presents to the clinmiic for a follow-up appointment for mid-back pain. Since the last visit, Yanni Kaiser states the pain has been persistant. She states that her biggest reason she is here today is for right-sided mid-back pain that started about 2 weeks ago as an itching in her right mid-back that wrapped around her right flank. It started as an itching, progressed to a burning after a couple of days, and is now more of an achy pain. She does not recall if she had a rash or not. She went to urgent care about a week ago and was prescribed Valtrex, but has not started taking it yet because she is nervous about taking a new medicine. She continues to have low back pain that radiates down the legs, mostly in the LLE.      Interval History 9/29/2020:  The patient is a virtual appointment today for follow-up of back and leg pain.  She is now status post L4/5 interlaminar epidural steroid injection.  She also reports limited benefit from this procedure.  Her pain is mainly located across the lower back with intermittent radiation into the legs.  Her back pain is currently her greatest complaint.  It bothers her the most when she stands for a long time in changes positions.  She is still in physical therapy but states that she missed her  appointment today due to her nephew being ill.  Her pain today is 10/10.     Interval History 8/25/2020:  The patient is here today for follow-up of chronic lower back pain.  The pain radiates down the posterolateral aspect of both legs to her shin.  She had limited benefit with bilateral L4/5 TF NAKITA in the past.  We previously discussed IL NAKITA which she would like to schedule.  Since her previous encounter, she has started physical therapy which she thinks is helpful.  She has increased her home activity regimen as previously discussed.  Her pain today is 9/10     Interval History 7/14/2020:  The patient presents for follow up of lower back and leg pain.  She is s/p bilateral L4/5 TF NAKITA on 6/24/20 with limited benefit of leg pain.  She denies any respiratory changes such as fever, cough, or shortness of breath.  She continues to report pain that starts across the lower back with radiation down the side of both legs.  She has associated numbness and tingling to both legs.  Her pain worsens when she walks and when she is active.  She says that she had to PT appointments did not have follow-up appointments made.  The previous note indicates that she is to continue with PT she is seeing [facility] for chronic pain as occasion.  She says that she has been taking Lyrica at night that is making her wake up in the middle of the night with dizziness.  She stops it as instructed by PCP.  She has an appointment with her neurologist tomorrow.  Her pain today is 10/10.     Previous Encounter:  Yanni Kaiser presents to the clinic for the evaluation of lower back pain. The pain started 3 years ago following no specific incident and symptoms have been worsening.The pain is located in the lumbar area and radiates to the left leg mostly but also into the right leg. Non-specific dermatomal distribution.  The pain is described as aching, sharp and shooting and is rated as 10/10. The pain is rated with a score of   10/10 on the BEST day and a score of 10/10 on the WORST day.  Symptoms interfere with daily activity and sleeping. The pain is exacerbated by Sitting, Walking, Lifting and Getting out of bed/chair.  The pain is mitigated by heat, ice, medications and physical therapy. She reports spending 4 hours per day reclining. The patient reports 0 hours of uninterrupted sleep per night.     Physical Therapy/Home Exercise: yes      Pain Disability Index Review:  Last 3 PDI Scores 1/18/2022 12/20/2021 11/11/2020   Pain Disability Index (PDI) 47 58 52      Pain Medications:     - Opioids: Percocet (Oxycodone/Acetaminophen)  - Adjuvant Medications: Advil,Motrin ( Ibuprofen)   Lidoderm patch, without benefit  Voltaren gel, without benefit  Gabapentin, side effects  Lamotrigine      report:  Reviewed and consistent with medication use as prescribed.     Pain Procedures:   6/4/20 Bilateral L4/5 TF NAKITA- only a few days of pain relief      Imaging:   Narrative       EXAMINATION:  MRI LUMBAR SPINE WITHOUT CONTRAST    CLINICAL HISTORY:  spinal stenosis; Lumbago with sciatica, right side    TECHNIQUE:  Multiplanar, multisequence MR images were acquired from the thoracolumbar junction to the sacrum without contrast.    COMPARISON:  MRI lumbar spine 08/28/2018    FINDINGS:  Straightening of the normal lumbar lordosis.  Mild grade 1 retrolisthesis L5 on S1    Vertebral body heights are maintained.  Several T1 and T2 hyperintense lesions within the L2 and L3 vertebral body favored to represent osseous hemangiomas.  Otherwise normal marrow signal.  No fracture.    Multilevel partial disc desiccation throughout the lumbar spine.  Posterior annular fissuring at L2-L3.    Distal spinal cord is unremarkable.  Conus terminates at L1-L2.    Degenerative findings:    T12-L1: No disc herniation, spinal canal stenosis, or neural foraminal narrowing.    L1-L2: No disc herniation, spinal canal stenosis, or neural foraminal narrowing.    L2-L3: No  disc herniation, spinal canal stenosis or neural foraminal narrowing.    L3-L4: Mild circumferential disc bulge without spinal canal stenosis or neural foraminal narrowing.    L4-L5: Circumferential disc bulge, facet arthropathy, and ligamentum flavum hypertrophy resulting in moderate spinal canal stenosis and moderate bilateral neural foraminal narrowing.    L5-S1: Circumferential disc bulge and facet arthropathy without spinal canal stenosis or neural foraminal narrowing.    Paraspinal muscles and soft tissues are unremarkable.       Impression         Multilevel lumbar spondylosis most prominent at L4-5 resulting in moderate spinal canal stenosis and moderate bilateral neural foraminal narrowing.  Findings have slightly progressed in comparison to prior study dated 08/28/2018.    Electronically signed by resident: Fady Reese  Date: 05/22/2020  Time: 08:27         Past Medical History:   Diagnosis Date    Allergy     Anemia     Anxiety     Asthma     Back pain     Chronic bronchitis     Chronic obstructive pulmonary disease, unspecified COPD type 4/1/2022    Cigarette smoker     DDD (degenerative disc disease), lumbar 6/9/2020    Depression     Diabetes mellitus with peripheral circulatory disorder 4/1/2022    Diabetes with neurologic complications     DUB (dysfunctional uterine bleeding) 10/16/2018    GERD (gastroesophageal reflux disease)     High cholesterol     Hyperprolactinemia     Hypertension     Influenza A 01/16/2020    Neuromuscular disorder     Obese body habitus     Obesity     Pseudotumor cerebri     Renal manifestation of secondary diabetes mellitus     Respiratory failure     Seizures     Simple endometrial hyperplasia 2018    Sleep apnea     Smoker     Tobacco dependence     Urinary incontinence      Past Surgical History:   Procedure Laterality Date    ANTROSTOMY OF MAXILLARY SINUS AT INFERIOR NASAL MEATUS  10/22/2021    Procedure: MAXILLARY ANTROSTOMY, AT  INFERIOR NASAL MEATUS;  Surgeon: John Prado III, MD;  Location: [facility] OR;  Service: ENT;;    BREAST CYST ASPIRATION       SECTION      X 1    CHOLECYSTECTOMY      COLONOSCOPY      COLONOSCOPY N/A 2019    Procedure: COLONOSCOPY;  Surgeon: Jagruti Sweeney MD;  Location: [facility] (2ND FLR);  Service: Endoscopy;  Laterality: N/A;  BMI is 63/gastroparesis/3 days full liquid diet 1 day clear liquid see telephone encounter by Ciara Brown     EPIDURAL STEROID INJECTION N/A 2020    Procedure: INJECTION, STEROID, EPIDURAL, L5-S1 IL;  Surgeon: Lawrence Marin MD;  Location: [facility] PAIN MGT;  Service: Pain Management;  Laterality: N/A;    ESOPHAGEAL MANOMETRY WITH MEASUREMENT OF IMPEDANCE N/A 2019    Procedure: MANOMETRY-ESOPHAGEAL-WITH IMPEDANCE;  Surgeon: Jagruti Sweeney MD;  Location: [facility] (2ND FLR);  Service: Endoscopy;  Laterality: N/A;  Hold Narcotics x 1 days   Hold TCA x 1 days  Propofol only. No fentanyl or benzodiazepine during sedation. If additional sedation needed, discuss with Dr. Sweeney.  10/29 - pt confirmed appt    ESOPHAGOGASTRODUODENOSCOPY N/A 2019    Procedure: EGD (ESOPHAGOGASTRODUODENOSCOPY);  Surgeon: Jagruti Sweeney MD;  [facility]  Service: Endoscopy;  Laterality: N/A;  BMI is 63/gastroparesis/3 days full liquid diet 1 day clear liquid see telephone encounter by Ciara limon    ESOPHAGOGASTRODUODENOSCOPY N/A 2019    Procedure: ESOPHAGOGASTRODUODENOSCOPY (EGD);  Surgeon: Jagruti Sweeney MD;  Location: [facility] (2ND FLR);  Service: Endoscopy;  Laterality: N/A;  EGD with EndoFlip /+/- Botox  2nd floor for gastroparesis/BMI 63 **367lbs**  Bariatric Stretcher needed  Manometry probe to be placed endoscopically during EGD procedure  Due to change in protocol, Full liquid diet for 3 days/ 1 day of clear liquids  Hi    ESOPHAGOGASTRODUODENOSCOPY N/A 2020    Procedure: ESOPHAGOGASTRODUODENOSCOPY (EGD) with BRAVO;   Surgeon: Jagruti Sweeney MD;  Location: [facility] ENDO (2ND FLR);  Service: Endoscopy;  Laterality: N/A;  with Dilation  2nd floor due to BMI 56.20 (327 lbs) and gastroparesis  3 days full liquid diet and 1 day clears    ESOPHAGOGASTRODUODENOSCOPY N/A 4/15/2021    Procedure: ESOPHAGOGASTRODUODENOSCOPY (EGD);  Surgeon: Jagruti Sweeney MD;  Location: [facility] ENDO (2ND FLR);  Service: Endoscopy;  Laterality: N/A;  Endoflip  2nd floor-gastroparesis, BMI 57.18, bariatric stretcher  full liquid diet x3 days, clear liquid diet x1 day prior to procedure  propofol only  covid test 4/12 primary care, instructions emailed-[facility]    FOOT FRACTURE SURGERY Left     FUNCTIONAL ENDOSCOPIC SINUS SURGERY (FESS) USING COMPUTER-ASSISTED NAVIGATION Bilateral 10/22/2021    Procedure: FESS, USING COMPUTER-ASSISTED NAVIGATION;  Surgeon: John Prado III, MD;  Location: [facility];  Service: ENT;  Laterality: Bilateral;  FOLLOW DR PRADO ANESTHESIA PROTOCAL / pt will stay 23 hour post surgery for SHARATH observation    HYSTEROSCOPY WITH DILATION AND CURETTAGE OF UTERUS N/A 10/16/2018    [facility]    INJECTION OF ANESTHETIC AGENT AROUND NERVE Bilateral 10/23/2020    Procedure: BLOCK, NERVE  bilateral L3,4,5 MBB;  Surgeon: Lawrence Marin MD;  Location: [facility] PAIN MGT;  Service: Pain Management;  Laterality: Bilateral;  bilateral L3,4,5 MBB   consent needed    INJECTION OF ANESTHETIC AGENT AROUND NERVE Bilateral 2/18/2022    Procedure: BLOCK, NERVE, L3-L4-L5 MEDIAL BRANCH;  Surgeon: Lawrence Marin MD;  Location: [facility] PAIN MGT;  Service: Pain Management;  Laterality: Bilateral;    PLANTAR FASCIOTOMY Left 11/18/2019    Procedure: FASCIOTOMY, PLANTAR;  Surgeon: Valentino Orourke DPM;  Location: [facility] 2ND FLR;  Service: Podiatry;  Laterality: Left;    RETINAL LASER PROCEDURE Left     SINUS SURGERY      SPHENOIDECTOMY Bilateral 10/22/2021    Procedure: SPHENOIDECTOMY;  Surgeon: John Prado III, MD;  Location: [facility];  Service: ENT;  Laterality:  Bilateral;    TRANSFORAMINAL EPIDURAL INJECTION OF STEROID Bilateral 6/24/2020    Procedure: INJECTION, STEROID, EPIDURAL, TRANSFORAMINAL APPROACH;  Surgeon: Lawrence Marin MD;  Location: [facility] PAIN T;  Service: Pain Management;  Laterality: Bilateral;    TRANSFORAMINAL EPIDURAL INJECTION OF STEROID Bilateral 1/28/2022    Procedure: INJECTION, STEROID, EPIDURAL, TRANSFORAMINAL APPROACH  Bilateral TFESI L4/L5      NEEDS CONSENT;  Surgeon: Lawrence Marin MD;  Location: [facility] PAIN MGT;  Service: Pain Management;  Laterality: Bilateral;    UPPER GASTROINTESTINAL ENDOSCOPY       Social History     Socioeconomic History    Marital status:    Tobacco Use    Smoking status: Current Every Day Smoker     Packs/day: 1.00     Years: 27.00     Pack years: 27.00     Types: Cigarettes     Start date: 1/1/1992    Smokeless tobacco: Never Used    Tobacco comment: 1/2 a pack if her days are good   Substance and Sexual Activity    Alcohol use: Not Currently     Alcohol/week: 0.0 standard drinks     Comment: socially    Drug use: No    Sexual activity: Yes     Partners: Male     Birth control/protection: None     Comment:      Social Determinants of Health     Financial Resource Strain: Medium Risk    Difficulty of Paying Living Expenses: Somewhat hard   Food Insecurity: Food Insecurity Present    Worried About Running Out of Food in the Last Year: Sometimes true    Ran Out of Food in the Last Year: Sometimes true   Transportation Needs: No Transportation Needs    Lack of Transportation (Medical): No    Lack of Transportation (Non-Medical): No   Physical Activity: Inactive    Days of Exercise per Week: 0 days    Minutes of Exercise per Session: 0 min   Stress: Stress Concern Present    Feeling of Stress : To some extent   Social Connections: Unknown    Frequency of Communication with Friends and Family: Never    Frequency of Social Gatherings with Friends and Family: Never    Active Member of  Clubs or Organizations: Yes    Attends Club or Organization Meetings: More than 4 times per year    Marital Status:    Housing Stability: High Risk    Unable to Pay for Housing in the Last Year: Yes    Number of Places Lived in the Last Year: 2    Unstable Housing in the Last Year: Yes     Family History   Problem Relation Age of Onset    Hypertension Mother     Diabetes Mother     Colon cancer Mother 50    Colon polyps Mother     Cataracts Mother     Heart disease Father     COPD Father     Heart attack Father     Hypertension Father     Ulcers Father     Cataracts Father     Glaucoma Father     Cancer Maternal Grandmother     Breast cancer Maternal Grandmother     Colon cancer Maternal Grandmother     Colon polyps Maternal Grandmother     No Known Problems Paternal Grandmother     No Known Problems Brother     No Known Problems Maternal Aunt     No Known Problems Maternal Uncle     No Known Problems Paternal Aunt     No Known Problems Paternal Uncle     No Known Problems Maternal Grandfather     No Known Problems Paternal Grandfather     No Known Problems Daughter     No Known Problems Son     No Known Problems Daughter     No Known Problems Daughter     Celiac disease Neg Hx     Cirrhosis Neg Hx     Crohn's disease Neg Hx     Cystic fibrosis Neg Hx     Esophageal cancer Neg Hx     Hemochromatosis Neg Hx     Inflammatory bowel disease Neg Hx     Irritable bowel syndrome Neg Hx     Liver cancer Neg Hx     Liver disease Neg Hx     Rectal cancer Neg Hx     Stomach cancer Neg Hx     Ulcerative colitis Neg Hx     Jonnie's disease Neg Hx     Tuberculosis Neg Hx     Lymphoma Neg Hx     Scleroderma Neg Hx     Rheum arthritis Neg Hx     Melanoma Neg Hx     Multiple sclerosis Neg Hx     Psoriasis Neg Hx     Lupus Neg Hx     Skin cancer Neg Hx     Amblyopia Neg Hx     Blindness Neg Hx     Macular degeneration Neg Hx     Retinal detachment Neg Hx      Strabismus Neg Hx     Stroke Neg Hx     Thyroid disease Neg Hx     Allergic rhinitis Neg Hx     Allergies Neg Hx     Angioedema Neg Hx     Asthma Neg Hx     Eczema Neg Hx     Immunodeficiency Neg Hx     Rhinitis Neg Hx     Urticaria Neg Hx     Atopy Neg Hx        Review of patient's allergies indicates:   Allergen Reactions    Gabapentin Other (See Comments)     Makes her twitch       Current Outpatient Medications   Medication Sig    albuterol (PROVENTIL) 2.5 mg /3 mL (0.083 %) nebulizer solution Take 3 mLs (2.5 mg total) by nebulization every 6 (six) hours as needed for Wheezing. Rescue    albuterol (PROVENTIL/VENTOLIN HFA) 90 mcg/actuation inhaler Inhale 2 puffs into the lungs every 6 (six) hours as needed for Wheezing or Shortness of Breath. Rescue    alcohol swabs (BD ALCOHOL SWABS) PadM Apply 1 each topically 2 (two) times a day.    allerg xt,D.farinae-D.pteronys (ODACTRA) 12 SQ-HDM Subl Place 1 tablet under the tongue once daily.    ammonium lactate 12 % Crea Apply twice daily to affected parts both feet as needed.    azelastine (ASTELIN) 137 mcg (0.1 %) nasal spray 1 spray (137 mcg total) by Nasal route 2 (two) times daily.    blood sugar diagnostic Strp 1 each by Misc.(Non-Drug; Combo Route) route 4 (four) times daily before meals and nightly. ACCU CHEK ELVIA PLUS METER    blood-glucose meter (ACCU-CHEK ELVIA PLUS METER) Misc TEST FOUR TIMES DAILY BEFORE MEALS AND EVERY NIGHT    budesonide-formoterol 160-4.5 mcg (SYMBICORT) 160-4.5 mcg/actuation HFAA Inhale 2 puffs into the lungs every 12 (twelve) hours. Controller    butalbital-acetaminophen-caffeine -40 mg (FIORICET, ESGIC) -40 mg per tablet Take 1 tablet by mouth every 4 (four) hours as needed for Pain.    cetirizine (ZYRTEC) 10 MG tablet Take 1 tablet (10 mg total) by mouth daily as needed for Allergies (itching).    cyclobenzaprine (FLEXERIL) 10 MG tablet Take 10 mg by mouth 3 (three) times daily.    diclofenac  sodium (VOLTAREN) 1 % Gel Apply 2 g topically 2 (two) times daily.    EPINEPHrine (EPIPEN) 0.3 mg/0.3 mL AtIn Inject 0.3 mLs (0.3 mg total) into the muscle once. for 1 dose    estradioL (ESTRACE) 0.01 % (0.1 mg/gram) vaginal cream Use 1 gram of estrogen cream in vagina nightly x 2 weeks, then twice a week thereafter.    fluconazole (DIFLUCAN) 150 MG Tab Take one pill every other day x 3 doses, then once weekly x 3 weeks    fluticasone propionate (FLONASE) 50 mcg/actuation nasal spray 2 sprays (100 mcg total) by Each Nostril route 2 (two) times a day.    JARDIANCE 10 mg tablet TAKE 1 TABLET(10 MG) BY MOUTH EVERY DAY    lancets (ACCU-CHEK FASTCLIX LANCET DRUM) Misc 1 Device by Misc.(Non-Drug; Combo Route) route 2 (two) times a day.    lancets (ACCU-CHEK SOFTCLIX LANCETS) Misc 1 Device by Misc.(Non-Drug; Combo Route) route 4 (four) times daily.    levocetirizine (XYZAL) 5 MG tablet Take 1 tablet (5 mg total) by mouth every evening.    lidocaine (LIDODERM) 5 % Place 1 patch onto the skin once daily. Remove & Discard patch within 12 hours or as directed by MD    LIDOcaine HCL 2% (XYLOCAINE) 2 % jelly Apply topically as needed. Apply topically once nightly to affected part of foot/feet.    losartan (COZAAR) 100 MG tablet Take 1 tablet (100 mg total) by mouth once daily.    montelukast (SINGULAIR) 10 mg tablet Take 1 tablet (10 mg total) by mouth every evening.    MOVANTIK 12.5 mg Tab Take 1 tablet by mouth once daily.    nicotine (NICODERM CQ) 21 mg/24 hr Place 1 patch onto the skin once daily.    nicotine, polacrilex, (NICORETTE) 2 mg Gum Take 1 each (2 mg total) by mouth as needed (Take 1 piece as needed.  Maximum of 10 per day.).    nystatin-triamcinolone (MYCOLOG) ointment Apply topically 2 (two) times daily.    ondansetron (ZOFRAN) 8 MG tablet Take 1 tablet (8 mg total) by mouth every 8 (eight) hours as needed for Nausea.    oxyCODONE-acetaminophen (PERCOCET) 7.5-325 mg per tablet Take 1 tablet by  mouth 3 (three) times daily as needed for Pain.    OZEMPIC 1 mg/dose (4 mg/3 mL) Inject 1 mg into the skin every 7 days.    pantoprazole (PROTONIX) 40 MG tablet TAKE 1 TABLET(40 MG) BY MOUTH TWICE DAILY    pregabalin (LYRICA) 50 MG capsule Take 1 capsule (50 mg total) by mouth 3 (three) times daily.    QUEtiapine (SEROQUEL) 25 MG Tab Take 1 tablet (25 mg total) by mouth once daily.    sodium chloride (SALINE NASAL) 0.65 % nasal spray 2 sprays by Nasal route as needed for Congestion.    topiramate (TOPAMAX) 50 MG tablet Take 1 tablet (50 mg total) by mouth 2 (two) times daily.    venlafaxine (EFFEXOR-XR) 75 MG 24 hr capsule Take 1 capsule (75 mg total) by mouth once daily. NO FURTHER REFILL WITHOUT APT    valACYclovir (VALTREX) 1000 MG tablet Take 1 tablet (1,000 mg total) by mouth 2 (two) times daily. for 7 days (Patient not taking: No sig reported)     Current Facility-Administered Medications   Medication    acetaminophen tablet 650 mg    albuterol inhaler 2 puff    diphenhydrAMINE injection 25 mg    EPINEPHrine (EPIPEN) 0.3 mg/0.3 mL pen injection 0.3 mg    methylPREDNISolone sodium succinate injection 40 mg    ondansetron disintegrating tablet 4 mg    sodium chloride 0.9% 500 mL flush bag    sodium chloride 0.9% flush 10 mL       REVIEW OF SYSTEMS:    GENERAL:  No weight loss, malaise or fevers.  HEENT:   No recent changes in vision or hearing. + Migraine  NECK:  Negative for lumps, no difficulty with swallowing.  RESPIRATORY:  Negative for cough, wheezing or shortness of breath, patient denies any recent URI. + SHARATH  CARDIOVASCULAR:  Negative for chest pain, leg swelling or palpitations. +HTN  GI:  Negative for abdominal discomfort, blood in stools or black stools or change in bowel habits.  MUSCULOSKELETAL:  See HPI.  SKIN:  Negative for lesions, rash, and itching.  PSYCH:  No mood disorder or recent psychosocial stressors.  Patients sleep is not disturbed secondary to  pain.  HEMATOLOGY/LYMPHOLOGY:  Negative for prolonged bleeding, bruising easily or swollen nodes.  Patient is not currently taking any anti-coagulants  NEURO:   No history of headaches, syncope, paralysis, seizures or tremors.  All other reviewed and negative other than HPI.    OBJECTIVE:     PHYSICAL EXAMINATION:    General appearance: Well appearing, in no acute distress, alert and oriented x3.  Psych:  Mood and affect appropriate.  Skin: Skin color, texture, turgor normal, no rashes or lesions, in both upper and lower body.  Head/face:  Atraumatic, normocephalic. No palpable lymph nodes  Cor: RRR  Pulm: CTA  GI: Abdomen soft and non-tender.  Back: Straight leg raising in the  supine positions is positive on the left to radicular pain. (+) ttp throughout lumbar paraspinals. Positive facet loading , bilateral.  Normal range of motion without pain reproduction.  Extremities: Peripheral joint ROM is full and pain free without obvious instability or laxity in all four extremities. No deformities, edema, or skin discoloration. Good capillary refill.  Musculoskeletal: (+) ttp over b/l GTB, Bilateral  lower extremity strength is normal and symmetric.  No atrophy or tone abnormalities are noted.  Neuro: Bilateral lower extremity sensation and reflexes symmetrical and intact  Gait: wnl      ASSESSMENT: 51 y.o. year old female with chronic low back pain pain, consistent with pain of multi factorial etiology. Has MRI documented spinal stenosis, NF stenosis and lumbar facet arthropathy.    1. Spondylosis of lumbosacral region, unspecified spinal osteoarthritis complication status     2. Spondylosis without myelopathy     3. Spinal stenosis of lumbar region, unspecified whether neurogenic claudication present           PLAN:     - Schedule for a second Diagnostic Medial branch block at bilateral L3,4, and 5 to help with axial low back pain and progress with a home exercise program.  - Patient scheduled to start the functional  restoration program tomorrow (4/13/22).   - Counseled patient regarding the importance of activity modification, physical therapy and weight loss.  - Continue current medication regimen: flexeril, percocet and lyrica. Flexeril refill provided today.  - RTC in 2 weeks following 2nd MBB       The above plan and management options were discussed at length with patient. Patient is in agreement with the above and verbalized understanding. It will be communicated with the referring physician via electronic record, fax, or mail.    Jaky Mcdermott  04/12/2022     I have reviewed and concur with the resident's history, physical, assessment, and plan.  I have personally interviewed and examined the patient at bedside.  See below addendum for my evaluation and additional findings.    Lawrence Marin MD

## 2022-04-13 ENCOUNTER — OFFICE VISIT (OUTPATIENT)
Dept: NEUROLOGY | Facility: CLINIC | Age: 52
End: 2022-04-13
Payer: MEDICARE

## 2022-04-13 VITALS
DIASTOLIC BLOOD PRESSURE: 86 MMHG | SYSTOLIC BLOOD PRESSURE: 129 MMHG | WEIGHT: 293 LBS | HEIGHT: 64 IN | HEART RATE: 76 BPM | BODY MASS INDEX: 50.02 KG/M2

## 2022-04-13 DIAGNOSIS — G43.009 MIGRAINE WITHOUT AURA AND WITHOUT STATUS MIGRAINOSUS, NOT INTRACTABLE: Primary | ICD-10-CM

## 2022-04-13 PROCEDURE — 99999 PR PBB SHADOW E&M-EST. PATIENT-LVL IV: CPT | Mod: PBBFAC,,, | Performed by: PSYCHIATRY & NEUROLOGY

## 2022-04-13 PROCEDURE — 3074F PR MOST RECENT SYSTOLIC BLOOD PRESSURE < 130 MM HG: ICD-10-PCS | Mod: CPTII,S$GLB,, | Performed by: PSYCHIATRY & NEUROLOGY

## 2022-04-13 PROCEDURE — 4010F PR ACE/ARB THEARPY RXD/TAKEN: ICD-10-PCS | Mod: CPTII,S$GLB,, | Performed by: PSYCHIATRY & NEUROLOGY

## 2022-04-13 PROCEDURE — 3079F DIAST BP 80-89 MM HG: CPT | Mod: CPTII,S$GLB,, | Performed by: PSYCHIATRY & NEUROLOGY

## 2022-04-13 PROCEDURE — 3008F BODY MASS INDEX DOCD: CPT | Mod: CPTII,S$GLB,, | Performed by: PSYCHIATRY & NEUROLOGY

## 2022-04-13 PROCEDURE — 99213 OFFICE O/P EST LOW 20 MIN: CPT | Mod: S$GLB,,, | Performed by: PSYCHIATRY & NEUROLOGY

## 2022-04-13 PROCEDURE — 99999 PR PBB SHADOW E&M-EST. PATIENT-LVL IV: ICD-10-PCS | Mod: PBBFAC,,, | Performed by: PSYCHIATRY & NEUROLOGY

## 2022-04-13 PROCEDURE — 3052F HG A1C>EQUAL 8.0%<EQUAL 9.0%: CPT | Mod: CPTII,S$GLB,, | Performed by: PSYCHIATRY & NEUROLOGY

## 2022-04-13 PROCEDURE — 3079F PR MOST RECENT DIASTOLIC BLOOD PRESSURE 80-89 MM HG: ICD-10-PCS | Mod: CPTII,S$GLB,, | Performed by: PSYCHIATRY & NEUROLOGY

## 2022-04-13 PROCEDURE — 4010F ACE/ARB THERAPY RXD/TAKEN: CPT | Mod: CPTII,S$GLB,, | Performed by: PSYCHIATRY & NEUROLOGY

## 2022-04-13 PROCEDURE — 3008F PR BODY MASS INDEX (BMI) DOCUMENTED: ICD-10-PCS | Mod: CPTII,S$GLB,, | Performed by: PSYCHIATRY & NEUROLOGY

## 2022-04-13 PROCEDURE — 3074F SYST BP LT 130 MM HG: CPT | Mod: CPTII,S$GLB,, | Performed by: PSYCHIATRY & NEUROLOGY

## 2022-04-13 PROCEDURE — 1159F PR MEDICATION LIST DOCUMENTED IN MEDICAL RECORD: ICD-10-PCS | Mod: CPTII,S$GLB,, | Performed by: PSYCHIATRY & NEUROLOGY

## 2022-04-13 PROCEDURE — 1159F MED LIST DOCD IN RCRD: CPT | Mod: CPTII,S$GLB,, | Performed by: PSYCHIATRY & NEUROLOGY

## 2022-04-13 PROCEDURE — 99213 PR OFFICE/OUTPT VISIT, EST, LEVL III, 20-29 MIN: ICD-10-PCS | Mod: S$GLB,,, | Performed by: PSYCHIATRY & NEUROLOGY

## 2022-04-13 PROCEDURE — 3052F PR MOST RECENT HEMOGLOBIN A1C LEVEL 8.0 - < 9.0%: ICD-10-PCS | Mod: CPTII,S$GLB,, | Performed by: PSYCHIATRY & NEUROLOGY

## 2022-04-13 RX ORDER — RIMEGEPANT SULFATE 75 MG/75MG
75 TABLET, ORALLY DISINTEGRATING ORAL ONCE AS NEEDED
Qty: 12 TABLET | Refills: 2 | Status: SHIPPED | OUTPATIENT
Start: 2022-04-13 | End: 2022-04-13

## 2022-04-13 RX ORDER — TOPIRAMATE 50 MG/1
100 TABLET, FILM COATED ORAL NIGHTLY
Qty: 180 TABLET | Refills: 3 | Status: SHIPPED | OUTPATIENT
Start: 2022-04-13 | End: 2022-06-20 | Stop reason: SDUPTHER

## 2022-04-13 NOTE — PROGRESS NOTES
[facility]  Virtual Visit     NEUROLOGY   [facility], [place]    Date: 4/13/22  Patient Name: Yanni Kaiser   MRN: [id number]   PCP: Carlyle Gordillo  Referring Provider: No ref. provider found    Assessment:   Yanni Kaiser is a 51 y.o. female presenting to establish care for migraine.  Increasing Topamax Krystal 150 mg nightly.  Trial of Nurtec for rescue as patient has tried and failed multiple triptans.  Discouraged use of opioids for management of migraine.  Plan:     Problem List Items Addressed This Visit        Neuro    Migraine - Primary (Chronic)    Overview     Patient referred for Botox due to her poor response to multiple preventive medications in the past and her frequency of headache occurring almost daily           Relevant Medications    topiramate (TOPAMAX) 50 MG tablet    rimegepant (NURTEC) 75 mg odt        Satya Marte MD  [facility]   Department of Neurology    Patient note was created using MModal Dictation.  Any errors in syntax or even information may not have been identified and edited on initial review prior to signing this note.  Subjective:   HPI:   Ms. Yanni Kaiser is a 51 y.o. female presenting in follow-up for migraine.  Patient has been lost to follow-up for years she reports she has been experiencing 2-3 migraines per week.  She is taking Percocet for management of these meds provided to her by another practitioner.  She states over-the-counter analgesics do not help.  She does states she is taking her Topamax as prescribed.  She denies side effects.      Prior Meds: Aimovig, Percocet, Diamox, Imitrex, Maxalt, Mobic, Zanaflex, Baclofen, Cymbalta, Effexor, Fioricet, Topamax    PAST MEDICAL HISTORY:  Past Medical History:   Diagnosis Date    Allergy     Anemia     Anxiety     Asthma     Back pain     Chronic bronchitis     Chronic obstructive pulmonary disease, unspecified COPD type 4/1/2022    Cigarette smoker      DDD (degenerative disc disease), lumbar 2020    Depression     Diabetes mellitus with peripheral circulatory disorder 2022    Diabetes with neurologic complications     DUB (dysfunctional uterine bleeding) 10/16/2018    GERD (gastroesophageal reflux disease)     High cholesterol     Hyperprolactinemia     Hypertension     Influenza A 2020    Neuromuscular disorder     Obese body habitus     Obesity     Pseudotumor cerebri     Renal manifestation of secondary diabetes mellitus     Respiratory failure     Seizures     Simple endometrial hyperplasia     Sleep apnea     Smoker     Tobacco dependence     Urinary incontinence      PAST SURGICAL HISTORY:  Past Surgical History:   Procedure Laterality Date    ANTROSTOMY OF MAXILLARY SINUS AT INFERIOR NASAL MEATUS  10/22/2021    Procedure: MAXILLARY ANTROSTOMY, AT INFERIOR NASAL MEATUS;  Surgeon: John Prado III, MD;  Location: [facility] OR;  Service: ENT;;    BREAST CYST ASPIRATION       SECTION      X 1    CHOLECYSTECTOMY      COLONOSCOPY      COLONOSCOPY N/A 2019    Procedure: COLONOSCOPY;  Surgeon: Jagruti Sweeney MD;  Location: [facility] (2ND FLR);  Service: Endoscopy;  Laterality: N/A;  BMI is 63/gastroparesis/3 days full liquid diet 1 day clear liquid see telephone encounter by Ciara Pinto [facility]    EPIDURAL STEROID INJECTION N/A 2020    Procedure: INJECTION, STEROID, EPIDURAL, L5-S1 IL;  Surgeon: Lawrence Marin MD;  Location: [facility] PAIN MGT;  Service: Pain Management;  Laterality: N/A;    ESOPHAGEAL MANOMETRY WITH MEASUREMENT OF IMPEDANCE N/A 2019    Procedure: MANOMETRY-ESOPHAGEAL-WITH IMPEDANCE;  Surgeon: Jagruti Sweeney MD;  Location: [facility] ENDO (2ND FLR);  Service: Endoscopy;  Laterality: N/A;  Hold Narcotics x 1 days   Hold TCA x 1 days  Propofol only. No fentanyl or benzodiazepine during sedation. If additional sedation needed, discuss with Dr. Sweeney.  10/29 - pt confirmed appt     ESOPHAGOGASTRODUODENOSCOPY N/A 1/14/2019    Procedure: EGD (ESOPHAGOGASTRODUODENOSCOPY);  Surgeon: Jagruti Sweeney MD;  Location: [facility] (2ND FLR);  Service: Endoscopy;  Laterality: N/A;  BMI is 63/gastroparesis/3 days full liquid diet 1 day clear liquid see telephone encounter by Ciara Brown     ESOPHAGOGASTRODUODENOSCOPY N/A 11/5/2019    Procedure: ESOPHAGOGASTRODUODENOSCOPY (EGD);  Surgeon: Jagruti Sweeney MD;  Location: [facility] ENDO (2ND FLR);  Service: Endoscopy;  Laterality: N/A;  EGD with EndoFlip /+/- Botox  2nd floor for gastroparesis/BMI 63 **367lbs**  Bariatric Stretcher needed  Manometry probe to be placed endoscopically during EGD procedure  Due to change in protocol, Full liquid diet for 3 days/ 1 day of clear liquids  Hi    ESOPHAGOGASTRODUODENOSCOPY N/A 12/14/2020    Procedure: ESOPHAGOGASTRODUODENOSCOPY (EGD) with BRAVO;  Surgeon: Jagruti Sweeney MD;  Location: [facility] (2ND FLR);  Service: Endoscopy;  Laterality: N/A;  with Dilation  2nd floor due to BMI 56.20 (327 lbs) and gastroparesis  3 days full liquid diet and 1 day clears    ESOPHAGOGASTRODUODENOSCOPY N/A 4/15/2021    Procedure: ESOPHAGOGASTRODUODENOSCOPY (EGD);  Surgeon: Jagruti Sweeney MD;  Location: [facility] ENDO (2ND FLR);  Service: Endoscopy;  Laterality: N/A;  Endoflip  2nd floor-gastroparesis, BMI 57.18, bariatric stretcher  full liquid diet x3 days, clear liquid diet x1 day prior to procedure  propofol only  covid test 4/12 primary care, instructions emailed-[facility]    FOOT FRACTURE SURGERY Left     FUNCTIONAL ENDOSCOPIC SINUS SURGERY (FESS) USING COMPUTER-ASSISTED NAVIGATION Bilateral 10/22/2021    Procedure: FESS, USING COMPUTER-ASSISTED NAVIGATION;  Surgeon: John Prado III, MD;  Location: [facility];  Service: ENT;  Laterality: Bilateral;  FOLLOW DR PRADO ANESTHESIA PROTOCAL / pt will stay 23 hour post surgery for SHARATH observation    HYSTEROSCOPY WITH DILATION AND CURETTAGE OF UTERUS N/A 10/16/2018    JOSE MIGUEL     INJECTION OF ANESTHETIC AGENT AROUND NERVE Bilateral 10/23/2020    Procedure: BLOCK, NERVE  bilateral L3,4,5 MBB;  Surgeon: Lawrence Marin MD;  Location: [facility] PAIN MGT;  Service: Pain Management;  Laterality: Bilateral;  bilateral L3,4,5 MBB   consent needed    INJECTION OF ANESTHETIC AGENT AROUND NERVE Bilateral 2/18/2022    Procedure: BLOCK, NERVE, L3-L4-L5 MEDIAL BRANCH;  Surgeon: Lawrence Marin MD;  Location: [facility] PAIN MGT;  Service: Pain Management;  Laterality: Bilateral;    PLANTAR FASCIOTOMY Left 11/18/2019    Procedure: FASCIOTOMY, PLANTAR;  Surgeon: Valentino Orourke DPM;  Location: [facility] OR [address] [place] [address];  Service: Podiatry;  Laterality: Left;    RETINAL LASER PROCEDURE Left     SINUS SURGERY      SPHENOIDECTOMY Bilateral 10/22/2021    Procedure: SPHENOIDECTOMY;  Surgeon: John Prado III, MD;  Location: [facility] OR;  Service: ENT;  Laterality: Bilateral;    TRANSFORAMINAL EPIDURAL INJECTION OF STEROID Bilateral 6/24/2020    Procedure: INJECTION, STEROID, EPIDURAL, TRANSFORAMINAL APPROACH;  Surgeon: Lawrence Marin MD;  Location: [facility] PAIN MGT;  Service: Pain Management;  Laterality: Bilateral;    TRANSFORAMINAL EPIDURAL INJECTION OF STEROID Bilateral 1/28/2022    Procedure: INJECTION, STEROID, EPIDURAL, TRANSFORAMINAL APPROACH  Bilateral TFESI L4/L5      NEEDS CONSENT;  Surgeon: Lawrence Marin MD;  Location: [facility] PAIN MGT;  Service: Pain Management;  Laterality: Bilateral;    UPPER GASTROINTESTINAL ENDOSCOPY         CURRENT MEDS:  Current Outpatient Medications   Medication Sig Dispense Refill    albuterol (PROVENTIL) 2.5 mg /3 mL (0.083 %) nebulizer solution Take 3 mLs (2.5 mg total) by nebulization every 6 (six) hours as needed for Wheezing. Rescue 3 mL 11    albuterol (PROVENTIL/VENTOLIN HFA) 90 mcg/actuation inhaler Inhale 2 puffs into the lungs every 6 (six) hours as needed for Wheezing or Shortness of Breath. Rescue 54 g 6    azelastine (ASTELIN) 137 mcg (0.1 %) nasal spray 1 spray (137  mcg total) by Nasal route 2 (two) times daily. 30 mL 11    budesonide-formoterol 160-4.5 mcg (SYMBICORT) 160-4.5 mcg/actuation HFAA Inhale 2 puffs into the lungs every 12 (twelve) hours. Controller 10.2 g 11    cetirizine (ZYRTEC) 10 MG tablet Take 1 tablet (10 mg total) by mouth daily as needed for Allergies (itching). 30 tablet 0    cyclobenzaprine (FLEXERIL) 10 MG tablet Take 1 tablet (10 mg total) by mouth 3 (three) times daily. 90 tablet 3    EPINEPHrine (EPIPEN) 0.3 mg/0.3 mL AtIn Inject 0.3 mLs (0.3 mg total) into the muscle once. for 1 dose 2 each 1    fluticasone propionate (FLONASE) 50 mcg/actuation nasal spray 2 sprays (100 mcg total) by Each Nostril route 2 (two) times a day. 16 g 11    JARDIANCE 10 mg tablet TAKE 1 TABLET(10 MG) BY MOUTH EVERY DAY 30 tablet 1    levocetirizine (XYZAL) 5 MG tablet Take 1 tablet (5 mg total) by mouth every evening. 90 tablet 3    LIDOcaine HCL 2% (XYLOCAINE) 2 % jelly Apply topically as needed. Apply topically once nightly to affected part of foot/feet. 30 mL 2    losartan (COZAAR) 100 MG tablet Take 1 tablet (100 mg total) by mouth once daily. 90 tablet 0    MOVANTIK 12.5 mg Tab Take 1 tablet by mouth once daily.      ondansetron (ZOFRAN) 8 MG tablet Take 1 tablet (8 mg total) by mouth every 8 (eight) hours as needed for Nausea. 90 tablet 11    oxyCODONE-acetaminophen (PERCOCET) 7.5-325 mg per tablet Take 1 tablet by mouth 3 (three) times daily as needed for Pain. 90 tablet 0    OZEMPIC 1 mg/dose (4 mg/3 mL) Inject 1 mg into the skin every 7 days. 3 pen 0    pantoprazole (PROTONIX) 40 MG tablet TAKE 1 TABLET(40 MG) BY MOUTH TWICE DAILY 60 tablet 6    pregabalin (LYRICA) 50 MG capsule Take 1 capsule (50 mg total) by mouth 3 (three) times daily. 90 capsule 3    QUEtiapine (SEROQUEL) 25 MG Tab Take 1 tablet (25 mg total) by mouth once daily. 90 tablet 0    venlafaxine (EFFEXOR-XR) 75 MG 24 hr capsule Take 1 capsule (75 mg total) by mouth once daily. NO  FURTHER REFILL WITHOUT APT 90 capsule 1    alcohol swabs (BD ALCOHOL SWABS) PadM Apply 1 each topically 2 (two) times a day. 200 each 4    allerg xt,D.farinae-D.pteronys (ODACTRA) 12 SQ-HDM Subl Place 1 tablet under the tongue once daily. 30 tablet 11    ammonium lactate 12 % Crea Apply twice daily to affected parts both feet as needed. 140 g 11    blood sugar diagnostic Strp 1 each by Misc.(Non-Drug; Combo Route) route 4 (four) times daily before meals and nightly. ACCU CHEK ELVIA PLUS METER 200 each 3    blood-glucose meter (ACCU-CHEK ELVIA PLUS METER) Mis TEST FOUR TIMES DAILY BEFORE MEALS AND EVERY NIGHT 90 each 1    diclofenac sodium (VOLTAREN) 1 % Gel Apply 2 g topically 2 (two) times daily. 100 g 2    estradioL (ESTRACE) 0.01 % (0.1 mg/gram) vaginal cream Use 1 gram of estrogen cream in vagina nightly x 2 weeks, then twice a week thereafter. 42.5 g 3    fluconazole (DIFLUCAN) 150 MG Tab Take one pill every other day x 3 doses, then once weekly x 3 weeks (Patient not taking: Reported on 4/13/2022) 6 tablet 0    lancets (ACCU-CHEK FASTCLIX LANCET DRUM) Misc 1 Device by Misc.(Non-Drug; Combo Route) route 2 (two) times a day. 200 each 4    lancets (ACCU-CHEK SOFTCLIX LANCETS) Misc 1 Device by Misc.(Non-Drug; Combo Route) route 4 (four) times daily. 200 each 3    lidocaine (LIDODERM) 5 % Place 1 patch onto the skin once daily. Remove & Discard patch within 12 hours or as directed by MD 15 patch 0    nicotine (NICODERM CQ) 21 mg/24 hr Place 1 patch onto the skin once daily. 28 patch 0    nicotine, polacrilex, (NICORETTE) 2 mg Gum Take 1 each (2 mg total) by mouth as needed (Take 1 piece as needed.  Maximum of 10 per day.). 220 each 0    nystatin-triamcinolone (MYCOLOG) ointment Apply topically 2 (two) times daily. 60 g 2    rimegepant (NURTEC) 75 mg odt Take 1 tablet (75 mg total) by mouth once as needed for Migraine. Place ODT tablet on the tongue; alternatively the ODT tablet may be placed under  the tongue 12 tablet 2    sodium chloride (SALINE NASAL) 0.65 % nasal spray 2 sprays by Nasal route as needed for Congestion. 104 mL 12    topiramate (TOPAMAX) 50 MG tablet Take 2 tablets (100 mg total) by mouth every evening. 180 tablet 3     Current Facility-Administered Medications   Medication Dose Route Frequency Provider Last Rate Last Admin    albuterol inhaler 2 puff  2 puff Inhalation Q20 Min PRN Carlyle Gordillo MD        diphenhydrAMINE injection 25 mg  25 mg Intravenous Once PRN Carlyle Gordillo MD        EPINEPHrine (EPIPEN) 0.3 mg/0.3 mL pen injection 0.3 mg  0.3 mg Intramuscular PRN Carlyle Gordillo MD        methylPREDNISolone sodium succinate injection 40 mg  40 mg Intravenous Once PRN Cralyle Gordillo MD        ondansetron disintegrating tablet 4 mg  4 mg Oral Once PRN Carlyle Gordillo MD        sodium chloride 0.9% 500 mL flush bag   Intravenous PRN Carlyle Gordillo MD        sodium chloride 0.9% flush 10 mL  10 mL Intravenous PRN Carlyle Gordillo MD           ALLERGIES:  Review of patient's allergies indicates:   Allergen Reactions    Gabapentin Other (See Comments)     Makes her twitch       FAMILY HISTORY:  Family History   Problem Relation Age of Onset    Hypertension Mother     Diabetes Mother     Colon cancer Mother 50    Colon polyps Mother     Cataracts Mother     Heart disease Father     COPD Father     Heart attack Father     Hypertension Father     Ulcers Father     Cataracts Father     Glaucoma Father     Cancer Maternal Grandmother     Breast cancer Maternal Grandmother     Colon cancer Maternal Grandmother     Colon polyps Maternal Grandmother     No Known Problems Paternal Grandmother     No Known Problems Brother     No Known Problems Maternal Aunt     No Known Problems Maternal Uncle     No Known Problems Paternal Aunt     No Known Problems Paternal Uncle     No Known Problems Maternal Grandfather     No Known Problems Paternal Grandfather     No  "Known Problems Daughter     No Known Problems Son     No Known Problems Daughter     No Known Problems Daughter     Celiac disease Neg Hx     Cirrhosis Neg Hx     Crohn's disease Neg Hx     Cystic fibrosis Neg Hx     Esophageal cancer Neg Hx     Hemochromatosis Neg Hx     Inflammatory bowel disease Neg Hx     Irritable bowel syndrome Neg Hx     Liver cancer Neg Hx     Liver disease Neg Hx     Rectal cancer Neg Hx     Stomach cancer Neg Hx     Ulcerative colitis Neg Hx     Jonnie's disease Neg Hx     Tuberculosis Neg Hx     Lymphoma Neg Hx     Scleroderma Neg Hx     Rheum arthritis Neg Hx     Melanoma Neg Hx     Multiple sclerosis Neg Hx     Psoriasis Neg Hx     Lupus Neg Hx     Skin cancer Neg Hx     Amblyopia Neg Hx     Blindness Neg Hx     Macular degeneration Neg Hx     Retinal detachment Neg Hx     Strabismus Neg Hx     Stroke Neg Hx     Thyroid disease Neg Hx     Allergic rhinitis Neg Hx     Allergies Neg Hx     Angioedema Neg Hx     Asthma Neg Hx     Eczema Neg Hx     Immunodeficiency Neg Hx     Rhinitis Neg Hx     Urticaria Neg Hx     Atopy Neg Hx        SOCIAL HISTORY:  Social History     Tobacco Use    Smoking status: Current Every Day Smoker     Packs/day: 1.00     Years: 27.00     Pack years: 27.00     Types: Cigarettes     Start date: 1/1/1992    Smokeless tobacco: Never Used    Tobacco comment: 1/2 a pack if her days are good   Substance Use Topics    Alcohol use: Not Currently     Alcohol/week: 0.0 standard drinks     Comment: socially    Drug use: No       Review of Systems:  12 system review of systems is negative except for the symptoms mentioned in HPI.      Objective:     Vitals:    04/13/22 1344   BP: 129/86   Pulse: 76   Weight: (!) 163.8 kg (361 lb 1.8 oz)   Height: 5' 4" (1.626 m)     Vitals:    04/13/22 1344   BP: 129/86   Pulse: 76   Weight: (!) 163.8 kg (361 lb 1.8 oz)   Height: 5' 4" (1.626 m)       General: NAD, well nourished   Eyes: no " tearing, discharge, no erythema   ENT: moist mucous membranes of the oral cavity, nares patent    Neck: Supple, full range of motion  Cardiovascular: Warm and well perfused, pulses equal and symmetrical  Lungs: Normal work of breathing, normal chest wall excursions  Skin: No rash, lesions, or breakdown on exposed skin  Psychiatry: Mood and affect are appropriate   Abdomen: soft, non tender, non distended  Extremeties: No cyanosis, clubbing or edema.    Neurological   MENTAL STATUS: Alert and oriented to person, place, and time. Attention and concentration within normal limits. Speech without dysarthria, able to name and repeat without difficulty. Recent and remote memory within normal limits   CRANIAL NERVES: Visual fields intact. PERRL. EOMI. Facial sensation intact. Face symmetrical. Hearing grossly intact. Full shoulder shrug bilaterally. Tongue protrudes midline   SENSORY: Sensation is intact to light touch throughout.   MOTOR: Normal bulk and tone. 5/5 deltoid, biceps, triceps, interosseous, hand  bilaterally. 5/5 iliopsoas, knee extension/flexion, foot dorsi/plantarflexion bilaterally.    REFLEXES: Symmetric and 2+ throughout. Toes down going bilaterally.   CEREBELLAR/COORDINATION/GAIT: Gait steady w. Finger to nose intact. Normal rapid alternating movements.

## 2022-04-14 ENCOUNTER — PATIENT MESSAGE (OUTPATIENT)
Dept: FAMILY MEDICINE | Facility: CLINIC | Age: 52
End: 2022-04-14
Payer: MEDICARE

## 2022-04-14 ENCOUNTER — PATIENT MESSAGE (OUTPATIENT)
Dept: REHABILITATION | Facility: OTHER | Age: 52
End: 2022-04-14
Payer: MEDICARE

## 2022-04-18 ENCOUNTER — PATIENT MESSAGE (OUTPATIENT)
Dept: ADMINISTRATIVE | Facility: OTHER | Age: 52
End: 2022-04-18
Payer: MEDICARE

## 2022-04-18 ENCOUNTER — SPECIALTY PHARMACY (OUTPATIENT)
Dept: PHARMACY | Facility: CLINIC | Age: 52
End: 2022-04-18
Payer: MEDICARE

## 2022-04-18 DIAGNOSIS — N18.31 TYPE 2 DIABETES MELLITUS WITH STAGE 3A CHRONIC KIDNEY DISEASE, WITH LONG-TERM CURRENT USE OF INSULIN: ICD-10-CM

## 2022-04-18 DIAGNOSIS — Z79.4 TYPE 2 DIABETES MELLITUS WITH STAGE 3A CHRONIC KIDNEY DISEASE, WITH LONG-TERM CURRENT USE OF INSULIN: ICD-10-CM

## 2022-04-18 DIAGNOSIS — E11.22 TYPE 2 DIABETES MELLITUS WITH STAGE 3A CHRONIC KIDNEY DISEASE, WITH LONG-TERM CURRENT USE OF INSULIN: ICD-10-CM

## 2022-04-18 NOTE — TELEPHONE ENCOUNTER
Refill Routing Note   Medication(s) are not appropriate for processing by [facility] for the following reason(s):      - Drug-Disease Interaction (JARDIANCE and Type 2 diabetes mellitus with stage 3 chronic kidney disease, with long-term current use of insulin; Type 2 diabetes mellitus with stage 3a chronic kidney disease, with long-term current use of insulin)    [facility] action(s):  Defer Medication-related problems identified: Drug-disease interaction        Medication reconciliation completed: No     Appointments  past 12m or future 3m with PCP    Date Provider   Last Visit   4/1/2022 Carlyle Gordillo MD   Next Visit   6/27/2022 Carlyle Gordillo MD   ED visits in past 90 days: 0        Note composed:5:25 PM 04/18/2022

## 2022-04-18 NOTE — TELEPHONE ENCOUNTER
No new care gaps identified.  Powered by [employer]. Reference number: [id number].   4/18/2022 5:56:48 AM CDT

## 2022-04-18 NOTE — TELEPHONE ENCOUNTER
Outgoing call to patient regarding Odactra refill. She did not come  as planned on 4/5 and medication was put back. Patient started Odactra in office on 3/8. She admits to missing doses, but she is not sure how many or how many were missed consecutively. Based on her start date of 3/8, she was due to run out of medication on 4/7 (12 days ago). InBasket sent to confirm if the provider would like the patient to restart therapy in office with observation or if she can proceed at home.

## 2022-04-19 RX ORDER — EMPAGLIFLOZIN 10 MG/1
TABLET, FILM COATED ORAL
Qty: 90 TABLET | Refills: 0 | Status: SHIPPED | OUTPATIENT
Start: 2022-04-19 | End: 2022-09-06 | Stop reason: SDUPTHER

## 2022-04-19 NOTE — PROGRESS NOTES
[facility]    Functional Restoration Program  Physical Therapy Treatment Note  FRP Day 2    Name: Yanni Kaiser  MRN:[id number]      Therapy Diagnosis:   Encounter Diagnoses   Name Primary?    Impaired functional mobility, balance, gait, and endurance Yes    Impaired functional mobility and activity tolerance     Decreased activities of daily living (ADL)     Recurrent major depressive disorder, in full remission     Chronic pain disorder      Physician: Snow Collazo NP    Date: 4/20/2022  Physician Orders: PT Eval and Treat   Medical Diagnosis from Referral:   G89.4 (ICD-10-CM) - Chronic pain disorder   Z78.9 (ICD-10-CM) - Decreased activities of daily living (ADL)   Z74.09 (ICD-10-CM) - Impaired functional mobility and activity tolerance         Evaluation Date: 3/15/2022  Authorization Period Expiration: 12/31/2022  Plan of Care Expiration: 6/10/2022  Visit # / Visits authorized: 2 / 20  FOTO: 1/ 3      Precautions: Standard, Diabetes and Fall     Time In: 1:30 pm  Time Out: 2:45 pm  Total Appointment Time (timed & untimed codes): 75 minutes        SUBJECTIVE       Yanni reports no sig sx presentation change since eval and reports spending most of her day in bed.  Pt note going to [employer] and dropping grandkids off are what get her out of bed.     Pt report HEP LE motions /c mod compliance noting they keep her legs from going numb.  Pt report difficulty /c LTR leading to inc LB discomfort.    Pt report missing Monday visit due to illness (sinus) and notes sig improvement into today and agrees to tx.          Patient's FRP goals: improve her function so she can enjoy life again      Current 6/10  Location(s):  LB     From 03/15/22 eval:   Current 6/10, worst 9/10, best 5/10   Location: low back, B hips, L LE & B feet (L worse)  Description: aching & stabbing  Aggravating Factors: sitting still, standing for too long, lifting (tries not to lift anything),  bending  Easing Factors: pain gel, pain pill, lying down      OBJECTIVE     Objective Measures updated at progress report unless specified.       Limitation/Restriction for FOTO lumbar Survey     Therapist reviewed FOTO scores for Yanni Kaiser on 3/15/2022.   FOTO documents entered into [employer] - see Media section.     Limitation Score: 49%  04/20/22: 71%       Predicted Score: 44%             Treatment       Yanni received the Individualized Treatments listed below:     therapeutic exercises to develop strength, endurance, ROM, flexibility, posture and core stabilization for 75 minutes including:    Full body stretch w/ 3-10 sec hold technique &/or 3-5 repetition technique on all of the following:   Cervical flx/ext   Cervical sidebending   Cervical rotation   Cervical protraction/retraction   Shld rolls   Shld depression/elevation   Scapular retraction/protraction/scaption   Posterior shld stretch (cross arm)   Shld ER stretch (chicken wing)   Elbow flx/ext   Forearm supination/pronation   Wrist flx/ext   Open/close hands/fingers   Seated trunk rotations   Seated trunk/thoracic ext/flx   Seated marches & knee to chest   Seated knee flx/ext   Seated hip ER/piriformis stretch   Seated hamstring stretch   Seated toe raise to heel raise    Seated ankle circles each way   Standing lumbar extensions   Standing lateral leaning stretch   Standing quad stretch (UE support for balance) - NP      Peripheral muscle strengthening which included 1-2 sets of 10-20 repetitions at a slow, controlled 5-7 second per rep pace focused on strengthening supporting musculature for improved body mechanics & functional mobility.  Patient & therapist focused on proper form during treatment to ensure optimal strengthening of each targeted muscle group. Patients are instructed to keep sets/reps with proper load to stay at 3-5 out of 10 on the provided modified Froylan exertion scale.       BATCA Machine Exercises  Weight (lbs) Sets Repetitions Froylan Exertion Scale Rating   Leg Extension        Hamstring Curls       Chest Press 25 1 20 5   Pec Fly 25 1 20 5   Lat Pull Down 25 1 20 4   Mid Row 20 1 20 4   Bicep Bar Curls       Standing Tricep Extension       Single Leg Press  R/L  25 1 20 4/7     Seated   LAQ x 10 B     Marching x 10 B min hip flexion     Supine  (Next visit)    LTR    Reviewed FOTO results    Patient Education and Home Exercises     Home Exercises Provided and Patient Education Provided     Education provided:   -resistance exercise basics    -stretching program      Written Home Exercises Provided: Patient instructed to cont prior HEP. Exercises were reviewed and Yanni was able to demonstrate them prior to the end of the session.  Yanni demonstrated fair  understanding of the education provided. See EMR under Patient Instructions for information provided during therapy sessions    ASSESSMENT     Despite subjective report of min change in sx presentation or activity tolerance since eval, FOTO score /c sig inc limitation score indicating decline in performance measures likely and appropriateness of FRP tx.  Pt able to demo LE HEP exercises /c accuracy however admits to min supine exercise performance.  Pt issued handout for HEP for inc participation especially considering pt reported extended time in bed presently limits her activity levels.  Pt ed on resistance exercise basics and initiated BATCA exercises.  Pt needing cue for pacing and maintaining rep count and demo cautious understanding of RPE application.  Plan to complete full complement of BATCA resistance exercises next visit.  Stretching program initiated today and pt able to perform /s noted inc discomfort therefore rx for daily performance.  Handout issued for inc participation      Yanni Is progressing well towards her goals.   Pt prognosis is Fair.     Pt will continue to benefit from skilled outpatient physical therapy to address the  deficits listed in the problem list box on initial evaluation, provide pt/family education and to maximize pt's level of independence in the home and community environment.     Pt's spiritual, cultural and educational needs considered and pt agreeable to plan of care and goals.     Anticipated barriers to physical therapy: chronic nature of issues, high BMI    GOALS: Pt is in agreement with the following goals:        Goal Progress Towards Goal   Short Term: In 3 weeks, pt will:     Verbalize & demonstrate good understanding of resisted training with 3-1-3 second rep for oytijmzfmz-ngkjhcake-klorwftkg contraction to encourage full muscle recruitment for strength & endurance Progress towards goal: initiated 3/15/2022   Verbalize & demonstrate good understanding of home stretch routine to improve AROM, help decrease pain & improve mobility Progress towards goal: initiated 3/15/2022   Demonstrate proper supine or seated diaphragmatic breathing with decreased chest excursion & emphasis on full abdominal excursion & increased expiration time to promote muscle relaxation & increased parasympathetic activity to improve patient tolerance to activities Progress towards goal: initiated 3/15/2022   Demonstrate proper static standing posture in frontal & sagittal plane per PT visual assessment to decrease postural strain in ADLs Progress towards goal: initiated 3/15/2022   Demonstrate good recall of names of resisted exercises w/ minimal to moderate verbal cues to promote independence w/ exercise at the end of the program Progress towards goal: initiated 3/15/2022   Verbalize plan for continuing resisted exercises w/ specific location (i.e. commercial gym, home, community fitness center) indicating preference for machine-based or free-weight exercises to incorporate all major muscle groups to maintain & progress therapeutic functional improvements Progress towards goal: initiated 3/15/2022         Long Term: In 8 weeks, pt  will:     Verbalize good understanding of activities planning/scheduling (stretching, mindfulness, resisted training, & cardio) prescribed by PT/OT following the end of the program to sustain & progress functional improvements Progress towards goal: initiated 3/15/2022   Be independent w/ selected resisted training w/ minimal to no cuing while performing to continue w/ resisted strengthening independently at the end of the program Progress towards goal: initiated 3/15/2022   Improve 2 Minute Walk Test (MWT) to 400 feet and 6 MWT to 1200 feet or greater to improve gait speed and mobility Progress towards goal: initiated 3/15/2022   Perform single leg stance 10 seconds or greater bilaterally to improve safety and balance in ADLs Progress towards goal: initiated 3/15/2022   Demonstrate Timed Up & Go (with appropriate assistive device, if necessary) of 10 seconds or less to decrease fall risk and improve functional mobility Progress towards goal: initiated 3/15/2022   Demonstrate 5 time sit to stand test without upper extremity assist to 15 sec or less to improve general mobility and decrease fall risk Progress towards goal: initiated 3/15/2022   Score 44 % or less on Lumbar FOTO to indicate significant functional improvements in impaired functions secondary to low back pain Progress towards goal: initiated 3/15/2022          PLAN     Continue PT per POC     Ha Mitchell, PT, DPT

## 2022-04-20 ENCOUNTER — CLINICAL SUPPORT (OUTPATIENT)
Dept: REHABILITATION | Facility: OTHER | Age: 52
End: 2022-04-20
Payer: MEDICARE

## 2022-04-20 ENCOUNTER — TELEPHONE (OUTPATIENT)
Dept: UROGYNECOLOGY | Facility: CLINIC | Age: 52
End: 2022-04-20
Payer: MEDICARE

## 2022-04-20 ENCOUNTER — OFFICE VISIT (OUTPATIENT)
Dept: PSYCHIATRY | Facility: OTHER | Age: 52
End: 2022-04-20
Payer: MEDICARE

## 2022-04-20 DIAGNOSIS — Z78.9 ALTERATION IN PERFORMANCE OF ACTIVITIES OF DAILY LIVING: Primary | ICD-10-CM

## 2022-04-20 DIAGNOSIS — F40.298 FEAR OF PAIN: ICD-10-CM

## 2022-04-20 DIAGNOSIS — F43.29 ADJUSTMENT DISORDER WITH PHYSICAL COMPLAINTS: Primary | ICD-10-CM

## 2022-04-20 DIAGNOSIS — G89.4 CHRONIC PAIN DISORDER: ICD-10-CM

## 2022-04-20 DIAGNOSIS — E11.9 TYPE 2 DIABETES MELLITUS WITHOUT COMPLICATION: ICD-10-CM

## 2022-04-20 DIAGNOSIS — Z74.09 IMPAIRED FUNCTIONAL MOBILITY AND ACTIVITY TOLERANCE: ICD-10-CM

## 2022-04-20 DIAGNOSIS — F33.42 RECURRENT MAJOR DEPRESSIVE DISORDER, IN FULL REMISSION: ICD-10-CM

## 2022-04-20 DIAGNOSIS — Z74.09 IMPAIRED FUNCTIONAL MOBILITY, BALANCE, GAIT, AND ENDURANCE: Primary | ICD-10-CM

## 2022-04-20 DIAGNOSIS — Z78.9 DECREASED ACTIVITIES OF DAILY LIVING (ADL): ICD-10-CM

## 2022-04-20 PROCEDURE — 3052F PR MOST RECENT HEMOGLOBIN A1C LEVEL 8.0 - < 9.0%: ICD-10-PCS | Mod: CPTII,,, | Performed by: SOCIAL WORKER

## 2022-04-20 PROCEDURE — 97530 THERAPEUTIC ACTIVITIES: CPT

## 2022-04-20 PROCEDURE — 90853 PR GROUP PSYCHOTHERAPY: ICD-10-PCS | Mod: ,,, | Performed by: SOCIAL WORKER

## 2022-04-20 PROCEDURE — 97110 THERAPEUTIC EXERCISES: CPT

## 2022-04-20 PROCEDURE — 3052F HG A1C>EQUAL 8.0%<EQUAL 9.0%: CPT | Mod: CPTII,,, | Performed by: SOCIAL WORKER

## 2022-04-20 PROCEDURE — 90853 GROUP PSYCHOTHERAPY: CPT | Mod: ,,, | Performed by: SOCIAL WORKER

## 2022-04-20 PROCEDURE — 4010F PR ACE/ARB THEARPY RXD/TAKEN: ICD-10-PCS | Mod: CPTII,,, | Performed by: SOCIAL WORKER

## 2022-04-20 PROCEDURE — 4010F ACE/ARB THERAPY RXD/TAKEN: CPT | Mod: CPTII,,, | Performed by: SOCIAL WORKER

## 2022-04-20 NOTE — PATIENT INSTRUCTIONS
FRP Machine-Based Functional Stengthening                 There are many different strength goals for continued gym attendance, but we know that chronic pain is best served with improved muscular endurance.  Here are some tips to remember to maintain your strength improvements from FRP to achieve those endurance goals:  Frequency should be 3-5 times per week for 15-75 minutes at a time  Use resistance/load appopriate for 15-20 repetitions  Keep your difficulty between 3-5 on the Froylan Exertion Scale    If 20 repetitions seems too easy, add more resistance    If 15 is way too hard, lighten the load   Perform slow & controlled motions using the 3-1-3 count, taking about 7 seconds for each repetition   In the gym where you continue, you may find other exercises or machines that you enjoy, so feel free to add those to your list following the same guidelines    If you enjoy some group fitness classes, apply the same principles on the exertion scale, modifying activities & pace to align with your own fitness level    Note: Some of the machines use single pulley systems  & others double.  This may cause the weight/resistance to feel different than your are used to in these exercises.  This is why we use the Froylan Scale to determine our weights.    CERVICAL SPINE AROM                CERVICAL FLEXION    Tilt your head downwards, then return back to looking straight ahead.                          CERVICAL EXTENSION    Tilt your head upwards, then return back to looking straight ahead.                      CERVICAL SIDE BEND    Tilt your head towards the side, then return back to looking straight ahead. Repeat to opposite side            CERVICAL ROTATION    Turn your head towards the side, then return back to looking straight ahead. Repeat to opposite side.                RETRACTION / CHIN TUCK    Slowly draw your head back so that your ears line up with your shoulders. Hold for  5-10  secs.                            Shoulder Exercises            SHOULDER ROLLS    Roll your shoulders forward    Roll your shoulders backward  Practice doing one shoulder at a time                        SHRUGS    Raise your shoulders upward towards your ears    Practice shrugging both shoulders at same time  Practice shrugging one shoulder at a time                    SCAPULAR RETRACTIONS  Draw your shoulder blades back and down  Practice doing both at same time  Practice one shoulder blade at a time                  BILATERAL SCAPTION    With thumbs pointing up toward the ceiling, raise your arms up in a V angle (45 deg)  Do not shrug your shoulder upwards                      GENTLE SHOULDER STRETCHES                                                                 CHICKEN WING      Place both hands behind your head.     Leaving your hands behind your head, bring your elbows together.      You can hold for 30 sec for a stretch or just perform 10-20 times           INTERNAL ROTATION STRETCH    Hold a towel in both hands. Use the upper hand (by head) to pull the towel up in order to feel a stretch in the shoulder that is holding the towel behind your back    You can perform without a towel by gently reaching up behind your back       Hold for 30 sec for 1-3 reps or gently just move the towel back and forth 10-20 times      POSTERIOR SHOULDER STRETCH      Gently pull on elbow with the other hand until a stretch is felt in the shoulder    To increase the stretch, straighten the elbow so your arm is straight    Hold 30 sec for 1-3 reps    Repeat opposite side    ELBOW & WRIST EXERCISES                ELBOW FLEXION EXTENSION    Bend your elbow upwards towards your chest  Then lower your arm to a straight position                        PRONATION AND SUPINATION Turn palm up  Turn palm over  Can do with elbow bent or straight                        WRIST FLEXION and EXTENSION Bend wrist backwards like going to give    someone a high five  Bend wrist down like showing off a ring      Can do with elbow bent or straight        CLOSE / OPEN HAND    Make a tight fist     Open your hand, spreading your fingers as wide as possible                        SEATED TRUNK STRETCHES                                                                            SEATED TRUNK ROTATION  Sit in chair with feet flat on ground.    Twist trunk to the right side. You can increase the stretch by grabbing onto side of chair and gently pulling.  Hold 10-30 secs. Repeat 1-5 each side.                THORACIC EXTENSION OVER CHAIR Sit on a chair and have your mid-back  touching the top of the chair.    Keeping your arms across your chest, extend your back over the top of the chair.    You can increase the stretch by placing your hands behind your head and stretching over chair.  Hold 10-30 sec. Repeat 1-5 times.                  SEATED THORACIC FLEXION    Take a breath in and as you blow it out, gently bend forward.    Increase the stretch by letting your neck completely relax.  Hold 10 seconds. Repeat 1- 10 times.    SEATED LEG EXERCISES                      SEATED MARCHING    Lift your knee towards your chest then lower down  Repeat with opposite leg                    SEATED KNEE EXTENSION    Straighten one leg out and tighten front thigh muscle. Lower leg.  Repeat with opposite leg                        SEATED PIRIFORMIS STRETCH    While seated, cross one leg over the other and hold for 30 seconds    For an increased stretch lean forward and hold  Perform 1-3 times each leg              SEATED HAMSTRING STRETCH    While seated, rest your heel on the floor with your knee straight and gently lean forward until a stretch is felt behind your knee/thigh  Hold 30 seconds  Repeat 1-3 times on each leg                ANKLE EXERCISES                                                                                                            STANDING EXTENSION  STRETCHES

## 2022-04-20 NOTE — PROGRESS NOTES
Group Psychotherapy    Site: [facility]    Clinical status of patient: Outpatient    4/20/2022    Length of service:[id number]    Referred by: Functional Restoration Program     Number of patients in attendance: 5    Target symptoms: Chronic Pain Management     Patient's response to intervention:  The patient's response to intervention is active listening.    Progress toward goals and other mental status changes:  The patient's progress toward goals is good, day 1 of group, no progress expected.    Plan: group psychotherapy    Topics Covered: Pt attended CBT for Chronic Pain as part of her participation in Functional Restoration. Topics discussed today included a discussion on the pain cycle and the stages of change and how those both work in conjunction with anxiety, depression, other mood disorders and chronic pain. Will f/u in 1 day.

## 2022-04-20 NOTE — PROGRESS NOTES
"[facility]   Functional Restoration Program  Occupational Therapy Daily Note  FRP Day 2      Name: Yanni Kaiser  Medical Record Number: [id number]    Therapy Diagnosis:   Encounter Diagnoses   Name Primary?    Alteration in performance of activities of daily living Yes    Fear of pain      Physician: Snow Collazo NP    Visit Date: 4/20/2022    Physician Orders: OT Eval and Treat  Medical Diagnosis: Chronic pain disorder  Evaluation Date: 3/15/2022  Insurance Authorization Period Expiration: 12/31/2022  Plan of Care Certification Period: 5/27/2022  Visit # / Visits authorized: 1/ 20  FOTO: evaluation: 60% limitation    Time In: 2:50 PM  Time Out: 4:00 PM  Total Billable Time: 70 minutes    Subjective     Yanni reports "I am awake all the time but I am not be up".         Pain: 6/10  Location: middle low back and L leg     Personal Functional Three Month Goals: Performance and satisfaction noted below.    OBJECTIVE      Saray participated in the following dynamic functional therapeutic activities:     Individualized Treatment: Increased resistance levels of functional conditioning exercises according to personal recovery goals. Activities include: Dynamic reaching, ybdnq-ej-esmgy lifting, sustained standing activity, maximal lifting, 2-handed carry, sustained seated tasks, push and pull, waist-to-shoulder lift, box/container carry, endurance training. Daily functional conditioning progress is documented on a flow sheet which is available upon request.      Pt participated in functional therapeutic activities/ lifting/endurance activities in order to increase pt's participation with vocational, selfcare ADLs, and leisure activities in order to improve quality of life for 75 min. .   Functional Activities:     Discussed established goals. Reviewed and revised goals based on patient feedback. Pt oriented/educated on functional lifting/endurance exercise program with plan for " progression toward goals. Pt educated on frequent functional lifts, verbalized and demonstrated understanding. Pt demonstrated understanding of functional tasks associated on program. Pt educated on the importance of paying attention to body with functional activities, considerations for use of log and use of keke exertion scale. Pt educated on proper body mechanics and safety with max lift and carrying tasks.    Pt completed maximal lift 3x5 reps with 5 #, rated as sort of hard (4/10), continued education focused on keeping weight close to center of gravity, maintain neutral spine positioning and pushing through heels for safety and activation of correct muscles, coordinating movement with breath, stepping to place > reaching to place.     Pt completed seated mindfulness pursed lip and diaphragmatic breathing activity [id number] with focus on posture, mechanics of breathing, and benefits re: PNS activation. Pt voiced and demonstrated understanding.    Pt participated in functional lift waist to shoulder, 20 reps x  8 lbs with a rating of sort of hard (4/10). Pt educated on standing posture, core awareness, keeping shoulders depressed and retracted, stepping to place > reaching to place, keeping weight close to center of gravity and pacing as needed. Noted burning sensation one seated for rest break post activity. Discussed being mindful of locking knees in standing with goal to maintain slight bend in knees to allow for consistent blood flow. Voiced understanding.     Pt completed maximal lift 2x5 reps with 8lbs, rated as hard (5/10), continued education focused on wide base of support, maintaining neutral spine positioning and pushing through heels for safety and activation of correct muscles, as well as coordinating movement with breath. After first set pt noted increased burning sensation in B hips. Educated on standing and seated hip stretches that include.    Pt completed dynamic reaching in various functional  ranges, with education focused on standing posture, core awareness, hip rotation/weight shifting, slow and controlled movements and coordinating movement with breath. Completed 15 reps x 1 lbs, rated as moderate (3/10) exertion.     Yanni participated in endurance activity using treadmill for 8 minutes, starting at 1.0 mph able able to progress to 1.2 mph, with goal to improve functional endurance and progress towards increased MET level for improved community mobility and participation, rated as hard (5/10), Yanni educated on how to add mindfulness component to activity and how to pace appropriately by completed self check ins and grading activity as needed, with goal to reach moderate to sort of hard by end of activity. Required standing rest break at 5:30 2/2 increased SOB. Re-educated on using pursed lip breathing. Voiced and demonstrated understanding. Able to continue post standing rest break.      Patient Education and Home Exercises     Home Exercises Provided and Patient Education Provided     Education provided:   -keke scale, pain cycle, z-lie, functional lifting mechanics, pursed lip and diaphragmatic breathing techniques    Written Home Exercises Provided: yes. Exercises were reviewed and Yanni was able to demonstrate them prior to the end of the session.  Yanni demonstrated good  understanding of the education provided. See EMR under Patient Instructions for information provided during therapy sessions    ASSESSMENT     Yanni  Presents with increased fatigue 2/2 poor sleep and post sinus infection. Despite this, she had good tolerance to session this date. Good response to all feedback and education and with good adjustments as needed. Continues to be limited by decreased functional endurance but with good use of pacing as needed and good response to breathing techniques. Continues to progress towards personal goals.     Yanni Is progressing well towards her goals.   Pt prognosis is  Excellent.     Pt will continue to benefit from skilled outpatient occupational therapy to address the deficits listed in the problem list box on initial evaluation, provide pt/family education and to maximize pt's level of independence in the home and community environment.      Pt's spiritual, cultural and educational needs considered and pt agreeable to plan of care and goals.     FRP Goals: While working towards the long-term (3 month) functional goals listed above, Bobby accomplish the following short term goals during the 5-week intensive rehabilitation program. Yanni will:     1. Develop a viable return to community participation plan.   2. Demonstrate physical capacities consistent with a medium work demand level.   3. Demonstrate increased functional strength by lifting 20 lbs floor to waist and 20 lbs waist to shoulder in order to meet demand of work/home routine.   4. Increase her positional tolerance to the level needed for work and home activities.   5. Implement self-care strategies during the program and at home to control pain.   6.   Patient will demonstrate use of mindfulness, stress management, and coping  strategies for improved participation in daily routine.      Specific patient expressed goals and demand levels:  Current Capacity Limit/ Physical  Demand Level (PDL)    Demand Levels of Functional Recovery Goals     Performance/Satisfaction  Day 1 Performance/Satisfaction  Day 10 Performance/Satisfaction  Follow-up      Light Physical Demand  (Based above tested results)     Vocational:  Attend /participate in [employer]     Recreational:  Walking      Increased socialization     Daily Living:  Cooking      Cleaning         Medium Physical Demand Level      1 / 1         1 / 0      1 / 0          3 / 1      1 / 1        The PDL listed above is based on today's physical performance screening test. More comprehensive physical  testing integrated with clinical findings would be required to  derived an accurate work capacity.       PLAN     Continue per POC.    Mira Ryder OT, LOTR, 4/20/2022   Occupational Therapy

## 2022-04-20 NOTE — TELEPHONE ENCOUNTER
----- Message from Sara Fleming sent at 4/20/2022  4:10 PM CDT -----  Pt called and wanted to reschedule her missed appointment from 04/20 with the provider. Please give PT a call back at [phone].

## 2022-04-21 ENCOUNTER — CLINICAL SUPPORT (OUTPATIENT)
Dept: REHABILITATION | Facility: OTHER | Age: 52
End: 2022-04-21
Payer: MEDICARE

## 2022-04-21 ENCOUNTER — OFFICE VISIT (OUTPATIENT)
Dept: PSYCHIATRY | Facility: OTHER | Age: 52
End: 2022-04-21
Payer: MEDICARE

## 2022-04-21 DIAGNOSIS — Z74.09 IMPAIRED FUNCTIONAL MOBILITY AND ACTIVITY TOLERANCE: ICD-10-CM

## 2022-04-21 DIAGNOSIS — F40.298 FEAR OF PAIN: ICD-10-CM

## 2022-04-21 DIAGNOSIS — Z78.9 ALTERATION IN PERFORMANCE OF ACTIVITIES OF DAILY LIVING: Primary | ICD-10-CM

## 2022-04-21 DIAGNOSIS — Z74.09 IMPAIRED FUNCTIONAL MOBILITY, BALANCE, GAIT, AND ENDURANCE: Primary | ICD-10-CM

## 2022-04-21 DIAGNOSIS — F43.29 ADJUSTMENT DISORDER WITH PHYSICAL COMPLAINTS: Primary | ICD-10-CM

## 2022-04-21 DIAGNOSIS — F33.42 RECURRENT MAJOR DEPRESSIVE DISORDER, IN FULL REMISSION: ICD-10-CM

## 2022-04-21 DIAGNOSIS — G89.4 CHRONIC PAIN DISORDER: ICD-10-CM

## 2022-04-21 DIAGNOSIS — Z78.9 DECREASED ACTIVITIES OF DAILY LIVING (ADL): ICD-10-CM

## 2022-04-21 PROCEDURE — 90853 PR GROUP PSYCHOTHERAPY: ICD-10-PCS | Mod: ,,, | Performed by: SOCIAL WORKER

## 2022-04-21 PROCEDURE — 4010F ACE/ARB THERAPY RXD/TAKEN: CPT | Mod: CPTII,,, | Performed by: SOCIAL WORKER

## 2022-04-21 PROCEDURE — 3052F PR MOST RECENT HEMOGLOBIN A1C LEVEL 8.0 - < 9.0%: ICD-10-PCS | Mod: CPTII,,, | Performed by: SOCIAL WORKER

## 2022-04-21 PROCEDURE — 3052F HG A1C>EQUAL 8.0%<EQUAL 9.0%: CPT | Mod: CPTII,,, | Performed by: SOCIAL WORKER

## 2022-04-21 PROCEDURE — 4010F PR ACE/ARB THEARPY RXD/TAKEN: ICD-10-PCS | Mod: CPTII,,, | Performed by: SOCIAL WORKER

## 2022-04-21 PROCEDURE — 97530 THERAPEUTIC ACTIVITIES: CPT

## 2022-04-21 PROCEDURE — 97110 THERAPEUTIC EXERCISES: CPT

## 2022-04-21 PROCEDURE — 90853 GROUP PSYCHOTHERAPY: CPT | Mod: ,,, | Performed by: SOCIAL WORKER

## 2022-04-21 NOTE — PROGRESS NOTES
[facility]    Functional Restoration Program  Physical Therapy Treatment Note  FRP Day 3    Name: Yanni Kaiser  MRN:[id number]      Therapy Diagnosis:   Encounter Diagnoses   Name Primary?    Impaired functional mobility, balance, gait, and endurance Yes    Impaired functional mobility and activity tolerance     Decreased activities of daily living (ADL)     Recurrent major depressive disorder, in full remission     Chronic pain disorder      Physician: Snow Collazo NP    Date: 4/21/2022  Physician Orders: PT Eval and Treat   Medical Diagnosis from Referral:   G89.4 (ICD-10-CM) - Chronic pain disorder   Z78.9 (ICD-10-CM) - Decreased activities of daily living (ADL)   Z74.09 (ICD-10-CM) - Impaired functional mobility and activity tolerance         Evaluation Date: 3/15/2022  Authorization Period Expiration: 12/31/2022  Plan of Care Expiration: 6/10/2022  Visit # / Visits authorized: 3 / 20  FOTO: 2/ 3      Precautions: Standard, Diabetes and Fall     Time In:  2:45 pm  Time Out: 4:00 pm  Total Appointment Time (timed & untimed codes): 75 minutes        SUBJECTIVE       Yanni reports tolerating last session well and report going out to dinner and Jain service.  Pt note no inc discomfort during Jain service. Pt plans to incorporate grandchildren into weekend stretches and walks.  However, pt believes she is limited in her outdoor activity tolerance due to heat and sinus irritation.            Patient's FRP goals: improve her function so she can enjoy life again      Current 6/10  Location(s):  [facility]           OBJECTIVE     Objective Measures updated at progress report unless specified.       Limitation/Restriction for FOTO lumbar Survey     Therapist reviewed FOTO scores for Yanni Kaiser on 3/15/2022.   FOTO documents entered into EPIC - see Media section.     Limitation Score: 49%  04/20/22: 71%       Predicted Score: 44%             Treatment        Yanni received the Individualized Treatments listed below:     therapeutic exercises to develop strength, endurance, ROM, flexibility, posture and core stabilization for 65 minutes including:      Peripheral muscle strengthening which included 1-2 sets of 10-20 repetitions at a slow, controlled 5-7 second per rep pace focused on strengthening supporting musculature for improved body mechanics & functional mobility.  Patient & therapist focused on proper form during treatment to ensure optimal strengthening of each targeted muscle group. Patients are instructed to keep sets/reps with proper load to stay at 3-5 out of 10 on the provided modified Froylan exertion scale.       Preventes.fr Machine Exercises Weight (lbs) Sets Repetitions Froylan Exertion Scale Rating   Leg Extension  10 1 17 6   Hamstring Curls 20 1 20 5   Chest Press       Pec Fly       Lat Pull Down       Mid Row       Bicep Bar Curls 10 1 15 5   Standing Tricep Extension 10 1 20 4   Single Leg Press  R/L  25 1 20 5       Supine   Hooklying - inc LB discomfort   Z lying - inc LB discomfort   Prone on elbow   KATE 2 min   Knee flex /c hip ER/IR x 10 each   Hip ext x 10 each    Press ups x 10 cue for breath OP     Supine /c folded pillow at lumbar   Hooklying diaphragmatic breaths 2# ankle weight on abdomen tactile cues x 20   LTR x 20 B   SLR x 10 each    therapeutic activities to develop strength, ROM, flexibility, and endurance for bed mobility: 10 minutes including:  Pt ed on using momentum and body weight for positional changes against gravity  Bed mobility :  supine <> sit <> stand x 2 cue to minimize rotation and flexion moments       Patient Education and Home Exercises     Home Exercises Provided and Patient Education Provided     Education provided:   Pt ed on supine<>stand bed mobility    Written Home Exercises Provided: Patient instructed to cont prior HEP. Exercises were reviewed and Yanni was able to demonstrate them prior to the end of the  session.  Yanni demonstrated fair  understanding of the education provided. See EMR under Patient Instructions for information provided during therapy sessions    ASSESSMENT     Pt /c low initial tolerance to supine positioning including in Z lying indicating possible ext directional preference.  Repeated and sustained relative ext in prone /s sx provocation, therefore pillow added to supine to mimic slight ext /c pt able to perform some exercises in supine.  Of note, taking pt's body habitus into account initial ext in prone lying and on elbow is limited and only press ups reach sig extension moment in lumbar region.      Plan to explore flexion motion next week including ball rollouts seated, flexion over counter to assess functional flexion tolerance.  Bed mobility explored as pt note AM stiffness sig inc /c initially getting out of bed. Pt demo method /c accuracy and note inc ease /c getting out of bed. Pt encouraged to use this method in conjunction /c AM Sun [place]utation exercise.  Yesterday and today pt complete full complement of BATCA resistance exercises.  Pt will need cont cue for pacing and RPE application for safe performance.    Also, pt advised to do FRP daily stretches through the weekend to promote dec bed lounging.        Yanni Is progressing well towards her goals.   Pt prognosis is Fair.     Pt will continue to benefit from skilled outpatient physical therapy to address the deficits listed in the problem list box on initial evaluation, provide pt/family education and to maximize pt's level of independence in the home and community environment.     Pt's spiritual, cultural and educational needs considered and pt agreeable to plan of care and goals.     Anticipated barriers to physical therapy: chronic nature of issues, high BMI    GOALS: Pt is in agreement with the following goals:        Goal Progress Towards Goal   Short Term: In 3 weeks, pt will:     Verbalize & demonstrate good  understanding of resisted training with 3-1-3 second rep for orrhczueyv-dksffnpxy-qizzafsul contraction to encourage full muscle recruitment for strength & endurance Progress towards goal: initiated 3/15/2022   Verbalize & demonstrate good understanding of home stretch routine to improve AROM, help decrease pain & improve mobility Progress towards goal: initiated 3/15/2022   Demonstrate proper supine or seated diaphragmatic breathing with decreased chest excursion & emphasis on full abdominal excursion & increased expiration time to promote muscle relaxation & increased parasympathetic activity to improve patient tolerance to activities Progress towards goal: initiated 3/15/2022   Demonstrate proper static standing posture in frontal & sagittal plane per PT visual assessment to decrease postural strain in ADLs Progress towards goal: initiated 3/15/2022   Demonstrate good recall of names of resisted exercises w/ minimal to moderate verbal cues to promote independence w/ exercise at the end of the program Progress towards goal: initiated 3/15/2022   Verbalize plan for continuing resisted exercises w/ specific location (i.e. commercial gym, home, community fitness center) indicating preference for machine-based or free-weight exercises to incorporate all major muscle groups to maintain & progress therapeutic functional improvements Progress towards goal: initiated 3/15/2022         Long Term: In 8 weeks, pt will:     Verbalize good understanding of activities planning/scheduling (stretching, mindfulness, resisted training, & cardio) prescribed by PT/OT following the end of the program to sustain & progress functional improvements Progress towards goal: initiated 3/15/2022   Be independent w/ selected resisted training w/ minimal to no cuing while performing to continue w/ resisted strengthening independently at the end of the program Progress towards goal: initiated 3/15/2022   Improve 2 Minute Walk Test (MWT) to 400  feet and 6 MWT to 1200 feet or greater to improve gait speed and mobility Progress towards goal: initiated 3/15/2022   Perform single leg stance 10 seconds or greater bilaterally to improve safety and balance in ADLs Progress towards goal: initiated 3/15/2022   Demonstrate Timed Up & Go (with appropriate assistive device, if necessary) of 10 seconds or less to decrease fall risk and improve functional mobility Progress towards goal: initiated 3/15/2022   Demonstrate 5 time sit to stand test without upper extremity assist to 15 sec or less to improve general mobility and decrease fall risk Progress towards goal: initiated 3/15/2022   Score 44 % or less on Lumbar FOTO to indicate significant functional improvements in impaired functions secondary to low back pain Progress towards goal: initiated 3/15/2022          PLAN     Continue PT per POC     Ha Mitchell, PT, DPT

## 2022-04-21 NOTE — PROGRESS NOTES
"[facility]   Functional Restoration Program  Occupational Therapy Daily Note  FRP Day 3      Name: Yanni Kaiser  Medical Record Number: [id number]    Therapy Diagnosis:   Encounter Diagnoses   Name Primary?    Alteration in performance of activities of daily living Yes    Fear of pain      Physician: Snow Collazo NP    Visit Date: 4/21/2022    Physician Orders: OT Eval and Treat  Medical Diagnosis: Chronic pain disorder  Evaluation Date: 3/15/2022  Insurance Authorization Period Expiration: 12/31/2022  Plan of Care Certification Period: 5/27/2022  Visit # / Visits authorized: 2 / 20  FOTO: evaluation: 60% limitation    Time In: 1:30 PM  Time Out: 2:45 PM  Total Billable Time: 75 minutes    Subjective     Yanni reports "Im not hurting any more than I usually am".         Pain: 7/10  Location: middle low back and L leg     Personal Functional Three Month Goals: Performance and satisfaction noted below.    OBJECTIVE      Saray participated in the following dynamic functional therapeutic activities:     Individualized Treatment: Increased resistance levels of functional conditioning exercises according to personal recovery goals. Activities include: Dynamic reaching, [id number]-sx-[id number] lifting, sustained standing activity, maximal lifting, 2-handed carry, sustained seated tasks, push and pull, waist-to-shoulder lift, box/container carry, endurance training. Daily functional conditioning progress is documented on a flow sheet which is available upon request.      Pt participated in functional therapeutic activities/ lifting/endurance activities in order to increase pt's participation with vocational, selfcare ADLs, and leisure activities in order to improve quality of life for 75 min. .   Functional Activities:     Full body stretch w/ 3-10 sec hold technique &/or 3-5 repetition technique on all of the following:   Cervical flx/ext   Cervical sidebending   Cervical " rotation   Cervical protraction/retraction   Shld rolls   Shld depression/elevation   Scapular retraction/protraction/scaption   Posterior shld stretch (cross arm)   Shld ER stretch (chicken wing)   Elbow flx/ext   Forearm supination/pronation   Wrist flx/ext   Open/close hands/fingers   Seated trunk rotations   Seated trunk/thoracic ext/flx   Seated marches & knee to chest   Seated knee flx/ext   Seated hip ER/piriformis stretch   Seated hamstring stretch   Seated toe raise to heel raise    Seated ankle circles each way   Standing lumbar extensions   Standing lateral leaning stretch   Standing quad stretch (UE support for balance)    Pt completed functional lifting, floor to waist, 3x5 reps x 6 lbs with exertion rated sort of hard (4 /10), focused education on activating glutes to stand, wide base of support, coordinating movement with breath and performing assisted flexion stretch at countertop. Verbal cues required for breathing.     Pt completed dynamic reaching in various functional ranges, with education focused on standing posture, core awareness, hip rotation/weight shifting, slow and controlled movements and coordinating movement with breath. Completed 10 reps x 2 lbs, rated as sort of hard (4/10) exertion.     Pt completed maximal lift 2x5 reps with 8 lbs, rated as sort of hard (4/10), continued education focused on keeping weight close to center of gravity, maintain neutral spine positioning and pushing through heels for safety and activation of correct muscles, coordinating movement with breath, stepping to place > reaching to place. Discussed during activity that she holds her breath frequently and struggles with forgetfulness and short term memory difficulties. Discussed common difficulties 2/2 pain. Educated on relationship between pain, breathing, nervous system, concentration etc. Voiced understanding     Pt completed seated mindfulness pursed lip and diaphragmatic breathing  activity [id number] with focus on posture, mechanics of breathing, and benefits re: PNS activation. Pt voiced and demonstrated understanding.    Pt participated in functional lift waist to shoulder, 15 reps x  8 lbs with a rating of hard (5/10). Pt educated on standing posture, core awareness, keeping shoulders depressed and retracted, stepping to place > reaching to place, keeping weight close to center of gravity and pacing as needed. Re-educated on staggered stance technique and coordinating movement with breath. Completed with no breaks but with seated breath break post activity.     Pt completed push and pull x 10 minutes with grocery basket in order to reach personal goal of improving grocery shopping ability, educated focused on upright standing posture and activating gluts with functional mobility, leading with hips, decreasing  on handles, using push/pull method with hands for improved control of cart, keeping cart close to center of gravity and weight shifting/stretching as needed. Rated as sort of hard (4/10) exertion.     Vertrice participated in endurance activity using [email or web address] for 10 minutes, starting at level 1.5 able able to progress to level 1.6, with goal to improve functional endurance and progress towards increased MET level for improved community mobility and participation, rated as really hard (6/10), Vertrice educated on how to add mindfulness component to activity and how to pace appropriately by completed self check ins and grading activity as needed, with goal to reach moderate to sort of hard by end of activity.     Patient Education and Home Exercises     Home Exercises Provided and Patient Education Provided     Education provided:   -ekke scale, pain cycle, z-lie, functional lifting mechanics, pursed lip and diaphragmatic breathing techniques    Written Home Exercises Provided: yes. Exercises were reviewed and Yanni was able to demonstrate them prior to the end of the session.   Yanni demonstrated good  understanding of the education provided. See EMR under Patient Instructions for information provided during therapy sessions    ASSESSMENT     Yanni Presents with continued pain in low back upon arrival but with good attitude and happy affect. She had good tolerance to session this date. Good continued response to all feedback and education and with good tolerance to weight increases with functional lifting tasks. Good response to breathing activity and with good response to push/pull activity. Continues to be limited by decreased functional endurance but with good use of pacing as needed and good response to breathing techniques. Continues to progress towards personal goals.     Yanni Is progressing well towards her goals.   Pt prognosis is Excellent.     Pt will continue to benefit from skilled outpatient occupational therapy to address the deficits listed in the problem list box on initial evaluation, provide pt/family education and to maximize pt's level of independence in the home and community environment.      Pt's spiritual, cultural and educational needs considered and pt agreeable to plan of care and goals.     FRP Goals: While working towards the long-term (3 month) functional goals listed above, [place] accomplish the following short term goals during the 5-week intensive rehabilitation program. Yanni will:     1. Develop a viable return to community participation plan.   2. Demonstrate physical capacities consistent with a medium work demand level.   3. Demonstrate increased functional strength by lifting 20 lbs floor to waist and 20 lbs waist to shoulder in order to meet demand of work/home routine.   4. Increase her positional tolerance to the level needed for work and home activities.   5. Implement self-care strategies during the program and at home to control pain.   6.   Patient will demonstrate use of mindfulness, stress management, and coping  strategies  for improved participation in daily routine.      Specific patient expressed goals and demand levels:  Current Capacity Limit/ Physical  Demand Level (PDL)    Demand Levels of Functional Recovery Goals     Performance/Satisfaction  Day 1 Performance/Satisfaction  Day 10 Performance/Satisfaction  Follow-up      Light Physical Demand  (Based above tested results)     Vocational:  Attend /participate in [employer]     Recreational:  Walking      Increased socialization     Daily Living:  Cooking      Cleaning         Medium Physical Demand Level      1 / 1         1 / 0      1 / 0          3 / 1      1 / 1        The PDL listed above is based on today's physical performance screening test. More comprehensive physical  testing integrated with clinical findings would be required to derived an accurate work capacity.       PLAN     Continue per POC.    Mira Ryder OT, LOTR, 4/21/2022   Occupational Therapy

## 2022-04-25 ENCOUNTER — CLINICAL SUPPORT (OUTPATIENT)
Dept: REHABILITATION | Facility: OTHER | Age: 52
End: 2022-04-25
Payer: MEDICARE

## 2022-04-25 ENCOUNTER — OFFICE VISIT (OUTPATIENT)
Dept: PSYCHIATRY | Facility: OTHER | Age: 52
End: 2022-04-25
Payer: MEDICARE

## 2022-04-25 DIAGNOSIS — G89.4 CHRONIC PAIN DISORDER: ICD-10-CM

## 2022-04-25 DIAGNOSIS — F33.42 RECURRENT MAJOR DEPRESSIVE DISORDER, IN FULL REMISSION: ICD-10-CM

## 2022-04-25 DIAGNOSIS — Z74.09 IMPAIRED FUNCTIONAL MOBILITY, BALANCE, GAIT, AND ENDURANCE: Primary | ICD-10-CM

## 2022-04-25 DIAGNOSIS — Z78.9 DECREASED ACTIVITIES OF DAILY LIVING (ADL): ICD-10-CM

## 2022-04-25 DIAGNOSIS — F43.29 ADJUSTMENT DISORDER WITH PHYSICAL COMPLAINTS: Primary | ICD-10-CM

## 2022-04-25 DIAGNOSIS — F40.298 FEAR OF PAIN: ICD-10-CM

## 2022-04-25 DIAGNOSIS — Z74.09 IMPAIRED FUNCTIONAL MOBILITY AND ACTIVITY TOLERANCE: ICD-10-CM

## 2022-04-25 DIAGNOSIS — Z78.9 ALTERATION IN PERFORMANCE OF ACTIVITIES OF DAILY LIVING: Primary | ICD-10-CM

## 2022-04-25 PROCEDURE — 90853 GROUP PSYCHOTHERAPY: CPT | Mod: ,,, | Performed by: SOCIAL WORKER

## 2022-04-25 PROCEDURE — 90853 PR GROUP PSYCHOTHERAPY: ICD-10-PCS | Mod: ,,, | Performed by: SOCIAL WORKER

## 2022-04-25 PROCEDURE — 3052F HG A1C>EQUAL 8.0%<EQUAL 9.0%: CPT | Mod: CPTII,,, | Performed by: SOCIAL WORKER

## 2022-04-25 PROCEDURE — 97110 THERAPEUTIC EXERCISES: CPT

## 2022-04-25 PROCEDURE — 4010F ACE/ARB THERAPY RXD/TAKEN: CPT | Mod: CPTII,,, | Performed by: SOCIAL WORKER

## 2022-04-25 PROCEDURE — 4010F PR ACE/ARB THEARPY RXD/TAKEN: ICD-10-PCS | Mod: CPTII,,, | Performed by: SOCIAL WORKER

## 2022-04-25 PROCEDURE — 97530 THERAPEUTIC ACTIVITIES: CPT

## 2022-04-25 PROCEDURE — 3052F PR MOST RECENT HEMOGLOBIN A1C LEVEL 8.0 - < 9.0%: ICD-10-PCS | Mod: CPTII,,, | Performed by: SOCIAL WORKER

## 2022-04-25 NOTE — PROGRESS NOTES
Group Psychotherapy    Site: [facility]    Clinical status of patient: Outpatient    4/21/2022    Length of service:[id number]    Referred by: Functional Restoration Program     Number of patients in attendance: 5    Target symptoms: Chronic Pain Management     Patient's response to intervention:  The patient's response to intervention is active listening.    Progress toward goals and other mental status changes:  The patient's progress toward goals is as expected.    Plan: group psychotherapy    Topics Covered: Pt attended CBT for Chronic Pain as part of her participation in Functional Restoration. Topics discussed today included a discussion on sleep and sleep hygiene. We discussed the role that sleep plays in pain perception, anxiety, depression and other mood disorders. We discussed ways to have healthier sleep habits and the benefits of such. We also went over homework for the weekend, will f/u on Monday.

## 2022-04-25 NOTE — PROGRESS NOTES
Group Psychotherapy    Site: [facility]    Clinical status of patient: Outpatient    4/25/2022    Length of service:[id number]    Referred by: Functional Restoration Program     Number of patients in attendance: 5    Target symptoms: Chronic Pain Management     Patient's response to intervention:  The patient's response to intervention is active listening.    Progress toward goals and other mental status changes:  The patient's progress toward goals is good.    Plan: group psychotherapy    Topics Covered: Pt attended CBT for Chronic Pain as part of her participation in Functional Restoration. Topics discussed today included a discussion on self care and how self care impacts and is impacted by anxiety, depression, other mood disorders and chronic pain. Pts discussed what worked for them this weekend in terms of changing behavior. Will f/u in 2 days.

## 2022-04-25 NOTE — PROGRESS NOTES
"[facility]   Functional Restoration Program  Occupational Therapy Daily Note  FRP Day 4      Name: Yanni Kaiser  Medical Record Number: [id number]    Therapy Diagnosis:   Encounter Diagnoses   Name Primary?    Alteration in performance of activities of daily living Yes    Fear of pain      Physician: Snow Collazo NP    Visit Date: 4/25/2022    Physician Orders: OT Eval and Treat  Medical Diagnosis: Chronic pain disorder  Evaluation Date: 3/15/2022  Insurance Authorization Period Expiration: 12/31/2022  Plan of Care Certification Period: 5/27/2022  Visit # / Visits authorized: 3 / 20  FOTO: evaluation: 60% limitation    Time In: 2:50  PM  Time Out: 4:00 PM  Total Billable Time: 70 minutes    Subjective     Yanni reports "im just sleeping today".         Pain: 5 / 10  Location: middle low back and L leg     Personal Functional Three Month Goals: Performance and satisfaction noted below.    OBJECTIVE      Saray participated in the following dynamic functional therapeutic activities:     Individualized Treatment: Increased resistance levels of functional conditioning exercises according to personal recovery goals. Activities include: Dynamic reaching, [id number] lifting, sustained standing activity, maximal lifting, 2-handed carry, sustained seated tasks, push and pull, waist-to-shoulder lift, box/container carry, endurance training. Daily functional conditioning progress is documented on a flow sheet which is available upon request.      Pt participated in functional therapeutic activities/ lifting/endurance activities in order to increase pt's participation with vocational, selfcare ADLs, and leisure activities in order to improve quality of life for 70 min. .   Functional Activities:     Pt completed functional lifting, floor to waist, 3x5 reps x 7 lbs with exertion rated hard (5 /10), focused education on activating glutes to stand, wide base of support, coordinating " "movement with breath and performing assisted flexion stretch at countertop. Verbal cues required for breathing. During activity she noted "I definitely find myself picking up stuff off the floor like this. And over the weekend I was like oh I guess this works"    Pt participated in functional lift waist to shoulder, 15 reps x  8 lbs with a rating of sort of hard (4/10). Pt educated on standing posture, core awareness, keeping shoulders depressed and retracted, stepping to place > reaching to place, keeping weight close to center of gravity and pacing as needed. Re-educated on staggered stance technique and coordinating movement with breath. Completed with no breaks but with seated breath break post activity.     Yanni participated in endurance activity using CamioCam for 10 minutes, starting at level 1.5 able able to progress to level 1.6, with goal to improve functional endurance and progress towards increased MET level for improved community mobility and participation, rated as hard (5-6/10), Yanni educated on how to add mindfulness component to activity and how to pace appropriately by completed self check ins and grading activity as needed, with goal to reach moderate to sort of hard by end of activity. Seated rest break required at 6:20 2/2 increased pain in R hip. Able to continue post breathing and stretching. She noted "today is just one of those days for me" Completed 1067 steps and able to maintain above 100 steps/min. Good use of breathing techniques post activity.     Pt completed dynamic reaching in various functional ranges, with education focused on standing posture, core awareness, hip rotation/weight shifting, slow and controlled movements and coordinating movement with breath. Completed 10 reps x 2 lbs, rated as sort of hard (4/10) exertion.     Pt completed maximal lift 2x5 reps with 9 lbs, rated as hard (6 /10), continued education focused on keeping weight close to center of gravity, " maintain neutral spine positioning and pushing through heels for safety and activation of correct muscles, coordinating movement with breath, stepping to place > reaching to place.    Pt completed standing task x 10 minutes at upright peg board to progress personal goal of increased standing tolerance to cook for family. While standing, completed BUE functional endurance activity by alternating BUE to place and remove pegs on board. Focused education on posture, weight shifting with single crate in front of feet as needed, keeping slight bend in knees and stretching as needed, rated easy (2/10). When pegs when dropped on ground, she used good body mechanics independently to pick them up. Completed seated hip and back stretches as well as breathing technique post activity 2/2 c/o burning in hips and low back. Report of improved pain post stretching and breathing.     Pt completed seated mindfulness pursed lip and diaphragmatic breathing activity [id number] with focus on posture, mechanics of breathing, and benefits re: PNS activation. Pt voiced and demonstrated understanding.     Patient Education and Home Exercises     Home Exercises Provided and Patient Education Provided     Education provided:   -keke scale, pain cycle, z-lie, functional lifting mechanics, pursed lip and diaphragmatic breathing techniques    Written Home Exercises Provided: yes. Exercises were reviewed and Yanni was able to demonstrate them prior to the end of the session.  Yanni demonstrated good  understanding of the education provided. See EMR under Patient Instructions for information provided during therapy sessions    ASSESSMENT     Yanni Presents with increased fatigue upon arrival. Despite this she continues with good motivation and willingness to participate. She continues with c/o pain in low back upon arrival ad in B hips throughout session but with good response to breathing and stretching. Despite increased time to complete  activities 2/2 fatigue, she had good tolerance to session this date. Good continued response to all feedback and education and with good tolerance to weight increases with functional lifting tasks. Continues to progress towards personal goals.     Yanni Is progressing well towards her goals.   Pt prognosis is Excellent.     Pt will continue to benefit from skilled outpatient occupational therapy to address the deficits listed in the problem list box on initial evaluation, provide pt/family education and to maximize pt's level of independence in the home and community environment.      Pt's spiritual, cultural and educational needs considered and pt agreeable to plan of care and goals.     FRP Goals: While working towards the long-term (3 month) functional goals listed above, [place] accomplish the following short term goals during the 5-week intensive rehabilitation program. Yanni will:     1. Develop a viable return to community participation plan.   2. Demonstrate physical capacities consistent with a medium work demand level.   3. Demonstrate increased functional strength by lifting 20 lbs floor to waist and 20 lbs waist to shoulder in order to meet demand of work/home routine.   4. Increase her positional tolerance to the level needed for work and home activities.   5. Implement self-care strategies during the program and at home to control pain.   6.   Patient will demonstrate use of mindfulness, stress management, and coping  strategies for improved participation in daily routine.      Specific patient expressed goals and demand levels:  Current Capacity Limit/ Physical  Demand Level (PDL)    Demand Levels of Functional Recovery Goals     Performance/Satisfaction  Day 1 Performance/Satisfaction  Day 10 Performance/Satisfaction  Follow-up      Light Physical Demand  (Based above tested results)     Vocational:  Attend /participate in [employer]     Recreational:  Walking      Increased  socialization     Daily Living:  Cooking      Cleaning         Medium Physical Demand Level      1 / 1         1 / 0      1 / 0          3 / 1      1 / 1        The PDL listed above is based on today's physical performance screening test. More comprehensive physical  testing integrated with clinical findings would be required to derived an accurate work capacity.       PLAN     Continue per POC.    Mira Ryder OT, LOT[facility], 4/25/2022   Occupational Therapy

## 2022-04-25 NOTE — PROGRESS NOTES
"  [facility]    Functional Restoration Program  Physical Therapy Treatment Note  FRP Day 4    Name: Yanni Kaiser  MRN:[id number]      Therapy Diagnosis:   Encounter Diagnoses   Name Primary?    Impaired functional mobility, balance, gait, and endurance Yes    Impaired functional mobility and activity tolerance     Decreased activities of daily living (ADL)     Recurrent major depressive disorder, in full remission     Chronic pain disorder      Physician: Snow Collazo NP    Date: 4/25/2022  Physician Orders: PT Eval and Treat   Medical Diagnosis from Referral:   G89.4 (ICD-10-CM) - Chronic pain disorder   Z78.9 (ICD-10-CM) - Decreased activities of daily living (ADL)   Z74.09 (ICD-10-CM) - Impaired functional mobility and activity tolerance         Evaluation Date: 3/15/2022  Authorization Period Expiration: 12/31/2022  Plan of Care Expiration: 6/10/2022  Visit # / Visits authorized: 4 / 20  FOTO: 2/ 3      Precautions: Standard, Diabetes and Fall     Time In:  1:30 pm  Time Out: 2:45 pm  Total Appointment Time (timed & untimed codes): 75 minutes        SUBJECTIVE       Vertrice reports inc amb for exercise over the weekend including walking on a track to be able to recall distance.  Pt note pride in attempts but resulting soreness today, especially in quads and ant lower leg.  Pt report including grandchildren in AM stretches.     Pt report tolerating last session well /c no limitation to evening activities (bible study).    Pt report cont sleep issues waking in middle of night but notes she is better able to fall asleep easier.  Pt believes she is able to do this because she is "overall more tired".         Patient's FRP goals: improve her function so she can enjoy life again      Current 4/10  Location(s):  Midline LB "my whole body"          OBJECTIVE     Objective Measures updated at progress report unless specified.       Limitation/Restriction for FOTO lumbar " Patient is intubated, does not follow commands, +1 to all extremities. CRRT overnight, patient tolerating well. Bong and Levo titrated to maintain MAP>70. Fentanyl is for pain control. Repositioned in bed for comfort. Labs are drawn per order. Electrolytes replaced per order. Will continue to monitor.    Survey     Therapist reviewed FOTO scores for Yanni Kaiser on 3/15/2022.   FOTO documents entered into [employer] - see Media section.     Limitation Score: 49%  04/20/22: 71%       Predicted Score: 44%             Treatment       Yanni received the Individualized Treatments listed below:     therapeutic exercises to develop strength, endurance, ROM, flexibility, posture and core stabilization for 70 minutes including:      Full body stretch w/ 3-10 sec hold technique &/or 3-5 repetition technique on all of the following:   Cervical flx/ext   Cervical sidebending   Cervical rotation   Cervical protraction/retraction   Shld rolls   Shld depression/elevation   Scapular retraction/protraction/scaption   Posterior shld stretch (cross arm)   Shld ER stretch (chicken wing)   Elbow flx/ext   Forearm supination/pronation   Wrist flx/ext   Open/close hands/fingers   Seated trunk rotations   Seated trunk/thoracic ext/flx   Seated marches & knee to chest   Seated knee flx/ext   Seated hip ER/piriformis stretch   Seated hamstring stretch   Seated toe raise to heel raise    Seated ankle circles each way   Standing lumbar extensions   Standing lateral leaning stretch   Standing quad stretch (UE support for balance)      Peripheral muscle strengthening which included 1-2 sets of 10-20 repetitions at a slow, controlled 5-7 second per rep pace focused on strengthening supporting musculature for improved body mechanics & functional mobility.  Patient & therapist focused on proper form during treatment to ensure optimal strengthening of each targeted muscle group. Patients are instructed to keep sets/reps with proper load to stay at 3-5 out of 10 on the provided modified Froylan exertion scale.       BATCA Machine Exercises Weight (lbs) Sets Repetitions Froylan Exertion Scale Rating   Leg Extension        Hamstring Curls       Chest Press 25 1 20 4   Pec Fly 25 1 15 5   Lat Pull Down 25 1 20 4   Mid Row 25 1 20  4   Bicep Bar Curls       Standing Tricep Extension       Single Leg Press  R/L  25 1 20 5       Seated   Tball rollouts - fwd x 10 L, R x 5 each  Long sitting   Pt ed on self STM for acute muscle discomfort post work out    L/R quad, ant thigh 3 min each  Prone on elbow   KATE 2 min   Press ups x 10 cue for breath OP     Supine /s folded pillow at lumbar   LTR x 20 B   SKTC x 10 each 5 sec hold    Therapeutic activities to develop strength, ROM, flexibility, and endurance for bed mobility 5 minutes including:   Pt demo supine<> sit /c mod accuracy x 2      Pt cued to dec valsalva /c motion     Pt reminded to use momentum and body weight for positional changes against gravity      Patient Education and Home Exercises     Home Exercises Provided and Patient Education Provided     Education provided:   Pt ed self STM for muscle discomfort    Written Home Exercises Provided: Patient instructed to cont prior HEP. Exercises were reviewed and Yanni was able to demonstrate them prior to the end of the session.  Yanni demonstrated fair  understanding of the education provided. See EMR under Patient Instructions for information provided during therapy sessions    ASSESSMENT     Pt subjective report indicate good motivation to include rx activity outside of tx.  Pt encouraged to cont stretching and amb through the week.  Pt need cue on identifying resistance exercise body part challenged but demo'd good pacing and appropriate RPE ratings.  Pt demo improved flexion tolerance noting no inc LB sx presentation /c rollouts and min /c supine exercises /s pillow under lumbar spine.  However, pt used B hand at LB for regional support indicating likely cont lumbar instability.  Recommend cont lumbopelvic stability exercises to improve positional tolerance including core engagement perhaps starting in sitting or SL to dec pt initial anxiety for LB stress in supine position.         Yanni Is progressing well towards her goals.    Pt prognosis is Fair.     Pt will continue to benefit from skilled outpatient physical therapy to address the deficits listed in the problem list box on initial evaluation, provide pt/family education and to maximize pt's level of independence in the home and community environment.     Pt's spiritual, cultural and educational needs considered and pt agreeable to plan of care and goals.     Anticipated barriers to physical therapy: chronic nature of issues, high BMI    GOALS: Pt is in agreement with the following goals:        Goal Progress Towards Goal   Short Term: In 3 weeks, pt will:     Verbalize & demonstrate good understanding of resisted training with 3-1-3 second rep for ysvhvspqad-flphamcso-gibwzoahg contraction to encourage full muscle recruitment for strength & endurance Progress towards goal: initiated 3/15/2022   Verbalize & demonstrate good understanding of home stretch routine to improve AROM, help decrease pain & improve mobility Progress towards goal: initiated 3/15/2022   Demonstrate proper supine or seated diaphragmatic breathing with decreased chest excursion & emphasis on full abdominal excursion & increased expiration time to promote muscle relaxation & increased parasympathetic activity to improve patient tolerance to activities Progress towards goal: initiated 3/15/2022   Demonstrate proper static standing posture in frontal & sagittal plane per PT visual assessment to decrease postural strain in ADLs Progress towards goal: initiated 3/15/2022   Demonstrate good recall of names of resisted exercises w/ minimal to moderate verbal cues to promote independence w/ exercise at the end of the program Progress towards goal: initiated 3/15/2022   Verbalize plan for continuing resisted exercises w/ specific location (i.e. commercial gym, home, community fitness center) indicating preference for machine-based or free-weight exercises to incorporate all major muscle groups to maintain & progress  therapeutic functional improvements Progress towards goal: initiated 3/15/2022         Long Term: In 8 weeks, pt will:     Verbalize good understanding of activities planning/scheduling (stretching, mindfulness, resisted training, & cardio) prescribed by PT/OT following the end of the program to sustain & progress functional improvements Progress towards goal: initiated 3/15/2022   Be independent w/ selected resisted training w/ minimal to no cuing while performing to continue w/ resisted strengthening independently at the end of the program Progress towards goal: initiated 3/15/2022   Improve 2 Minute Walk Test (MWT) to 400 feet and 6 MWT to 1200 feet or greater to improve gait speed and mobility Progress towards goal: initiated 3/15/2022   Perform single leg stance 10 seconds or greater bilaterally to improve safety and balance in ADLs Progress towards goal: initiated 3/15/2022   Demonstrate Timed Up & Go (with appropriate assistive device, if necessary) of 10 seconds or less to decrease fall risk and improve functional mobility Progress towards goal: initiated 3/15/2022   Demonstrate 5 time sit to stand test without upper extremity assist to 15 sec or less to improve general mobility and decrease fall risk Progress towards goal: initiated 3/15/2022   Score 44 % or less on Lumbar FOTO to indicate significant functional improvements in impaired functions secondary to low back pain Progress towards goal: initiated 3/15/2022          PLAN     Continue PT per POC     Ha Mitchell, PT, DPT

## 2022-04-26 ENCOUNTER — TELEPHONE (OUTPATIENT)
Dept: SMOKING CESSATION | Facility: CLINIC | Age: 52
End: 2022-04-26
Payer: MEDICARE

## 2022-04-26 NOTE — TELEPHONE ENCOUNTER
Provider confirms patient will need to resume therapy in [place] since it has been >7 days since she ran out of medication. She is having the nurse reach out to schedule. Spoke with patient to inform. Will continue to follow for the appointment.

## 2022-04-27 ENCOUNTER — CLINICAL SUPPORT (OUTPATIENT)
Dept: REHABILITATION | Facility: OTHER | Age: 52
End: 2022-04-27
Payer: MEDICARE

## 2022-04-27 ENCOUNTER — OFFICE VISIT (OUTPATIENT)
Dept: PSYCHIATRY | Facility: OTHER | Age: 52
End: 2022-04-27
Payer: MEDICARE

## 2022-04-27 DIAGNOSIS — Z78.9 DECREASED ACTIVITIES OF DAILY LIVING (ADL): ICD-10-CM

## 2022-04-27 DIAGNOSIS — Z74.09 IMPAIRED FUNCTIONAL MOBILITY AND ACTIVITY TOLERANCE: ICD-10-CM

## 2022-04-27 DIAGNOSIS — F33.42 RECURRENT MAJOR DEPRESSIVE DISORDER, IN FULL REMISSION: ICD-10-CM

## 2022-04-27 DIAGNOSIS — G89.4 CHRONIC PAIN DISORDER: ICD-10-CM

## 2022-04-27 DIAGNOSIS — Z74.09 IMPAIRED FUNCTIONAL MOBILITY, BALANCE, GAIT, AND ENDURANCE: Primary | ICD-10-CM

## 2022-04-27 DIAGNOSIS — Z78.9 ALTERATION IN PERFORMANCE OF ACTIVITIES OF DAILY LIVING: Primary | ICD-10-CM

## 2022-04-27 DIAGNOSIS — F43.29 ADJUSTMENT DISORDER WITH PHYSICAL COMPLAINTS: Primary | ICD-10-CM

## 2022-04-27 DIAGNOSIS — F40.298 FEAR OF PAIN: ICD-10-CM

## 2022-04-27 PROCEDURE — 4010F ACE/ARB THERAPY RXD/TAKEN: CPT | Mod: CPTII,,, | Performed by: SOCIAL WORKER

## 2022-04-27 PROCEDURE — 3052F PR MOST RECENT HEMOGLOBIN A1C LEVEL 8.0 - < 9.0%: ICD-10-PCS | Mod: CPTII,,, | Performed by: SOCIAL WORKER

## 2022-04-27 PROCEDURE — 97110 THERAPEUTIC EXERCISES: CPT

## 2022-04-27 PROCEDURE — 90853 GROUP PSYCHOTHERAPY: CPT | Mod: ,,, | Performed by: SOCIAL WORKER

## 2022-04-27 PROCEDURE — 90853 PR GROUP PSYCHOTHERAPY: ICD-10-PCS | Mod: ,,, | Performed by: SOCIAL WORKER

## 2022-04-27 PROCEDURE — 97530 THERAPEUTIC ACTIVITIES: CPT

## 2022-04-27 PROCEDURE — 4010F PR ACE/ARB THEARPY RXD/TAKEN: ICD-10-PCS | Mod: CPTII,,, | Performed by: SOCIAL WORKER

## 2022-04-27 PROCEDURE — 3052F HG A1C>EQUAL 8.0%<EQUAL 9.0%: CPT | Mod: CPTII,,, | Performed by: SOCIAL WORKER

## 2022-04-27 NOTE — PROGRESS NOTES
"[facility]   Functional Restoration Program  Occupational Therapy Daily Note  FRP Day 5      Name: Yanni Kaiser  Medical Record Number: [id number]    Therapy Diagnosis:   Encounter Diagnoses   Name Primary?    Alteration in performance of activities of daily living Yes    Fear of pain      Physician: Snow Collazo NP    Visit Date: 4/27/2022    Physician Orders: OT Eval and Treat  Medical Diagnosis: Chronic pain disorder  Evaluation Date: 3/15/2022  Insurance Authorization Period Expiration: 12/31/2022  Plan of Care Certification Period: 5/27/2022  Visit # / Visits authorized: 4 / 20  FOTO: evaluation: 60% limitation    Time In: 2:50  PM  Time Out: 4:00 PM  Total Billable Time: 70 minutes    Subjective     Lyndseyivan reports "im tired because harish been sick since Monday evening and I havent been sleeping and  When I did go to sleep it was about 5 oclock this morning so it was more like a nap so I had to get re-centered and refocused but I was determined to come here".         Pain: 6 / 10  Location: middle low back and L leg     Personal Functional Three Month Goals: Performance and satisfaction noted below.    OBJECTIVE      Saray participated in the following dynamic functional therapeutic activities:     Individualized Treatment: Increased resistance levels of functional conditioning exercises according to personal recovery goals. Activities include: Dynamic reaching, [id number] lifting, sustained standing activity, maximal lifting, 2-handed carry, sustained seated tasks, push and pull, waist-to-shoulder lift, box/container carry, endurance training. Daily functional conditioning progress is documented on a flow sheet which is available upon request.      Pt participated in functional therapeutic activities/ lifting/endurance activities in order to increase pt's participation with vocational, selfcare ADLs, and leisure activities in order to improve quality of life for " 70 min.   Functional Activities:     Pt completed dynamic reaching in various functional ranges, with education focused on standing posture, core awareness, hip rotation/weight shifting, slow and controlled movements and coordinating movement with breath. Completed 12 reps x 2 lbs, rated as sort of hard (4/10) exertion. Performed standing shoulder stretch at wall.     Pt completed functional lifting, floor to waist, 3x5 reps x 7 lbs with exertion rated sort of hard (4 /10), focused education on activating glutes to stand, wide base of support, coordinating movement with breath and performing assisted flexion stretch at countertop. Verbal cues required for breathing. Carried conversation throughout activity re: finding her personal peace and happiness and how to create healthy boundaries and find alone time. Voiced understanding. Noted [employer] was a good place for peace.     Pt participated in functional lift waist to shoulder, 15 reps x  9 lbs with a rating of moderate (3/10). Pt educated on standing posture, core awareness, keeping shoulders depressed and retracted, stepping to place > reaching to place, keeping weight close to center of gravity and pacing as needed. Re-educated on staggered stance technique and coordinating movement with breath. Completed with no breaks but with seated breath break post activity.     Pt completed maximal lift 2x5 reps with 9 lbs, rated as sort of hard (4 /10), continued education focused on keeping weight close to center of gravity, maintain neutral spine positioning and pushing through heels for safety and activation of correct muscles, coordinating movement with breath, stepping to place > reaching to place. Noted rating activity 4/10 2/2 SOB not weight tolerance.     Pt completed seated mindfulness diaphragmatic breathing activity [id number] with focus on posture, mechanics of breathing, and benefits re: PNS activation. Pt voiced and demonstrated understanding.     Yanni  participated in endurance activity using treadmill for x15 minutes, starting at 1.3 mph able able to progress to 1.7 mph before lowering speed to 1.6 to stay within 3-5 range on keke exertion scale, with goal to improve functional endurance and progress towards increased MET level for improved community mobility and participation, rated as sort of hard (4/10), Yanni educated on how to add mindfulness component to activity and how to pace appropriately by completed self check ins and grading activity as needed, with goal to reach moderate to sort of hard by end of activity. During activity she discussed that she knows her own limits and she reports her body tells her when she goes ever the limits. Discussed teetering the limits of pushing and pacing. She noted that she has been more intentional with breathing while she is walking as well.     Pt completed standing task x 10 minutes at upright peg board to progress personal goal of increased standing tolerance to cook for family. While standing, completed BUE functional endurance activity by alternating BUE to place and remove pegs on board. Focused education on posture, weight shifting with single crate in front of feet as needed, keeping slight bend in knees and stretching as needed, rated moderate (3/10). Good independence with weight shifting using single crate as needed. She noted burning in B feet. Discussed using z-lie position to place feet above heart for improved circulation and to decrease HR. Discussed laying in supine causes increased back pain. Educated on how to use pillows for positioning and support. Voiced understanding.     Pt completed seated mindfulness pursed lip and diaphragmatic breathing activity [id number] with focus on posture, mechanics of breathing, and benefits re: PNS activation. Pt voiced and demonstrated understanding.     Patient Education and Home Exercises     Home Exercises Provided and Patient Education Provided     Education  provided:   -keke scale, pain cycle, z-lie, functional lifting mechanics, pursed lip and diaphragmatic breathing techniques    Written Home Exercises Provided: yes. Exercises were reviewed and Yanni was able to demonstrate them prior to the end of the session.  Yanni demonstrated good  understanding of the education provided. See EMR under Patient Instructions for information provided during therapy sessions    ASSESSMENT     Yanni Presents with continued increased fatigue upon arrival 2/2 feeling sick since Monday and decreased sleep. Despite this she continues with good motivation and determination and with continued willingness to participate. Continues with increased time required to complete activities 2/2 fatigue, but overall she had good tolerance to session this date noting she felt better today than monday. Good continued response to all feedback and education and with good tolerance to weight increases with functional lifting tasks. Improved body awareness and improved independence with pacing. Continues to progress towards personal goals.     Yanni Is progressing well towards her goals.   Pt prognosis is Excellent.     Pt will continue to benefit from skilled outpatient occupational therapy to address the deficits listed in the problem list box on initial evaluation, provide pt/family education and to maximize pt's level of independence in the home and community environment.      Pt's spiritual, cultural and educational needs considered and pt agreeable to plan of care and goals.     FRP Goals: While working towards the long-term (3 month) functional goals listed above, Bobby accomplish the following short term goals during the 5-week intensive rehabilitation program. Yanni will:     1. Develop a viable return to community participation plan.   2. Demonstrate physical capacities consistent with a medium work demand level.   3. Demonstrate increased functional strength by lifting 20  lbs floor to waist and 20 lbs waist to shoulder in order to meet demand of work/home routine.   4. Increase her positional tolerance to the level needed for work and home activities.   5. Implement self-care strategies during the program and at home to control pain.   6.   Patient will demonstrate use of mindfulness, stress management, and coping  strategies for improved participation in daily routine.      Specific patient expressed goals and demand levels:  Current Capacity Limit/ Physical  Demand Level (PDL)    Demand Levels of Functional Recovery Goals     Performance/Satisfaction  Day 1 Performance/Satisfaction  Day 10 Performance/Satisfaction  Follow-up      Light Physical Demand  (Based above tested results)     Vocational:  Attend /participate in [employer]     Recreational:  Walking      Increased socialization     Daily Living:  Cooking      Cleaning         Medium Physical Demand Level      1 / 1 1 / 0 1 / 0          3 / 1      1 / 1        The PDL listed above is based on today's physical performance screening test. More comprehensive physical  testing integrated with clinical findings would be required to derived an accurate work capacity.       PLAN     Continue per POC.    Mira Ryder OT, DEMIAN, 4/27/2022   Occupational Therapy

## 2022-04-27 NOTE — PROGRESS NOTES
Group Psychotherapy    Site: [facility]    Clinical status of patient: Outpatient    4/27/2022    Length of service:[id number]    Referred by: Functional Restoration Program     Number of patients in attendance: 4    Target symptoms: Chronic Pain Management     Patient's response to intervention:  The patient's response to intervention is active listening.    Progress toward goals and other mental status changes:  The patient's progress toward goals is good.    Plan: group psychotherapy    Topics Covered: Pt attended CBT for Chronic Pain as part of her participation in Functional Restoration. Topics discussed today included a discussion on self care and a discussion on the autonomic nervous system and how it impacts and is impacted by anxiety, depression, other mood disorders and chronic pain. Pts also attended a 30 min lecture on eating for chronic pain and managing inflammation.

## 2022-04-27 NOTE — PROGRESS NOTES
"    [facility]    Functional Restoration Program  Physical Therapy Treatment Note  FRP Day 5    Name: Yanni Kaiser  MRN:[id number]      Therapy Diagnosis:   Encounter Diagnoses   Name Primary?    Impaired functional mobility, balance, gait, and endurance Yes    Impaired functional mobility and activity tolerance     Decreased activities of daily living (ADL)     Recurrent major depressive disorder, in full remission     Chronic pain disorder      Physician: Snow Collazo NP    Date: 4/27/2022  Physician Orders: PT Eval and Treat   Medical Diagnosis from Referral:   G89.4 (ICD-10-CM) - Chronic pain disorder   Z78.9 (ICD-10-CM) - Decreased activities of daily living (ADL)   Z74.09 (ICD-10-CM) - Impaired functional mobility and activity tolerance         Evaluation Date: 3/15/2022  Authorization Period Expiration: 12/31/2022  Plan of Care Expiration: 6/10/2022  Visit # / Visits authorized: 5 / 20  FOTO: 2/ 3      Precautions: Standard, Diabetes and Fall     Time In:  1:30 pm  Time Out: 2:45 pm  Total Appointment Time (timed & untimed codes): 75 minutes        SUBJECTIVE     Yanni reports returning home Monday night and experiencing gastroparesis flare up.  Pt report staying in bed all Tuesday due to discomfort.   However, despite flare up, pt notes "I didn't want to do just nothing" and performing stretches and breathing exercises in bed.  Pt presents today noting min sleep and feeling "not mentally here".         Patient's FRP goals: improve her function so she can enjoy life again      Current 4/10  Location(s):  Midline LB "my whole body"      OBJECTIVE     Objective Measures updated at progress report unless specified.       Limitation/Restriction for FOTO lumbar Survey     Therapist reviewed FOTO scores for Yanni Kaiser on 3/15/2022.   FOTO documents entered into [employer] - see Media section.     Limitation Score: 49%  04/20/22: 71%       Predicted Score: " 44%             Treatment       Vertrice received the Individualized Treatments listed below:     therapeutic exercises to develop strength, endurance, ROM, flexibility, posture and core stabilization for 75 minutes including:    Full body stretch w/ 3-10 sec hold technique &/or 3-5 repetition technique on all of the following:   Cervical flx/ext   Cervical sidebending   Cervical rotation   Cervical protraction/retraction   Shld rolls   Shld depression/elevation   Scapular retraction/protraction/scaption   Posterior shld stretch (cross arm)   Shld ER stretch (chicken wing)   Elbow flx/ext   Forearm supination/pronation   Wrist flx/ext   Open/close hands/fingers   Seated trunk rotations   Seated trunk/thoracic ext/flx   Seated marches & knee to chest   Seated knee flx/ext   Seated hip ER/piriformis stretch   Seated hamstring stretch   Seated toe raise to heel raise    Seated ankle circles each way   Standing lumbar extensions   Standing lateral leaning stretch   Standing quad stretch (UE support for balance)      Peripheral muscle strengthening which included 1-2 sets of 10-20 repetitions at a slow, controlled 5-7 second per rep pace focused on strengthening supporting musculature for improved body mechanics & functional mobility.  Patient & therapist focused on proper form during treatment to ensure optimal strengthening of each targeted muscle group. Patients are instructed to keep sets/reps with proper load to stay at 3-5 out of 10 on the provided modified Froylan exertion scale.       BATCA Machine Exercises Weight (lbs) Sets Repetitions Froylan Exertion Scale Rating   Leg Extension  5 1 20 7   Hamstring Curls 20 1 20 4   Chest Press       Pec Fly       Lat Pull Down       Mid Row       Bicep Bar Curls 10 1 20 5   Standing Tricep Extension 12.5 1 20 3   Single Leg Press  R/L  30 1 20 4       Seated    Trunk rotations unweighted x 10   Trunk rotations /c 7# med ball x 10     SOC x 20 limited  "pelvic rotation, tactile cue for dec torso shifting  Standing   Pelvic tilts - dance cue x 10      Supine /c B hand under lumbar region   LTR x 10 B  Semi recline   LTR x 10    SKTC x 10 each 5 sec hold   PPT x 10 tactile cue for pelvic tilt, verbal cue to dec lumbar paraspinal activation      Patient Education and Home Exercises     Home Exercises Provided and Patient Education Provided     Education provided:   - resistance exercise basics - pacing, naming     Written Home Exercises Provided: Patient instructed to cont prior HEP. Exercises were reviewed and Yanni was able to demonstrate them prior to the end of the session.  Yanni demonstrated fair  understanding of the education provided. See EMR under Patient Instructions for information provided during therapy sessions    ASSESSMENT       Although pt cont to place hands at lumbar region for relative extension moment for comfort when supine, pt able to tolerate semi recline placing lumbar region in relative flexion indicating likely lumbar instability in neutral range.  This is evidenced /c pt demo poor core motor call up in supine and semi recline and only slightly improved /c WB positions.  Pt not able to respond to cueing until discussion of reduced paraspinal activation elicit some TA activation.  Recommend cont core activation exercises to promote improved posture and dec "hanging on hips" /c excess lumbar ext in standing. Resistance training focus on exercise recall and body region affected to promote improved exercise understanding and effectiveness upon Ind workouts /p FRP.  Pt /c mod understanding and f/u will be necessary for adaption into Ind exercise programming.           Yanni Is progressing well towards her goals.   Pt prognosis is Fair.     Pt will continue to benefit from skilled outpatient physical therapy to address the deficits listed in the problem list box on initial evaluation, provide pt/family education and to maximize pt's " level of independence in the home and community environment.     Pt's spiritual, cultural and educational needs considered and pt agreeable to plan of care and goals.     Anticipated barriers to physical therapy: chronic nature of issues, high BMI    GOALS: Pt is in agreement with the following goals:        Goal Progress Towards Goal   Short Term: In 3 weeks, pt will:     Verbalize & demonstrate good understanding of resisted training with 3-1-3 second rep for eytuoznaru-rmkclzccn-[id number] contraction to encourage full muscle recruitment for strength & endurance Progress towards goal: initiated 3/15/2022   Verbalize & demonstrate good understanding of home stretch routine to improve AROM, help decrease pain & improve mobility Progress towards goal: initiated 3/15/2022   Demonstrate proper supine or seated diaphragmatic breathing with decreased chest excursion & emphasis on full abdominal excursion & increased expiration time to promote muscle relaxation & increased parasympathetic activity to improve patient tolerance to activities Progress towards goal: initiated 3/15/2022   Demonstrate proper static standing posture in frontal & sagittal plane per PT visual assessment to decrease postural strain in ADLs Progress towards goal: initiated 3/15/2022   Demonstrate good recall of names of resisted exercises w/ minimal to moderate verbal cues to promote independence w/ exercise at the end of the program Progress towards goal: initiated 3/15/2022   Verbalize plan for continuing resisted exercises w/ specific location (i.e. commercial gym, home, community fitness center) indicating preference for machine-based or free-weight exercises to incorporate all major muscle groups to maintain & progress therapeutic functional improvements Progress towards goal: initiated 3/15/2022         Long Term: In 8 weeks, pt will:     Verbalize good understanding of activities planning/scheduling (stretching, mindfulness, resisted  training, & cardio) prescribed by PT/OT following the end of the program to sustain & progress functional improvements Progress towards goal: initiated 3/15/2022   Be independent w/ selected resisted training w/ minimal to no cuing while performing to continue w/ resisted strengthening independently at the end of the program Progress towards goal: initiated 3/15/2022   Improve 2 Minute Walk Test (MWT) to 400 feet and 6 MWT to 1200 feet or greater to improve gait speed and mobility Progress towards goal: initiated 3/15/2022   Perform single leg stance 10 seconds or greater bilaterally to improve safety and balance in ADLs Progress towards goal: initiated 3/15/2022   Demonstrate Timed Up & Go (with appropriate assistive device, if necessary) of 10 seconds or less to decrease fall risk and improve functional mobility Progress towards goal: initiated 3/15/2022   Demonstrate 5 time sit to stand test without upper extremity assist to 15 sec or less to improve general mobility and decrease fall risk Progress towards goal: initiated 3/15/2022   Score 44 % or less on Lumbar FOTO to indicate significant functional improvements in impaired functions secondary to low back pain Progress towards goal: initiated 3/15/2022          PLAN     Continue PT per POC     Ha Mitchell, PT, DPT

## 2022-04-27 NOTE — PROGRESS NOTES
"    [facility]    Functional Restoration Program  Physical Therapy Treatment Note  FRP Day 6    Name: Yanni Kaiser  MRN:[id number]      Therapy Diagnosis:   Encounter Diagnoses   Name Primary?    Impaired functional mobility, balance, gait, and endurance Yes    Impaired functional mobility and activity tolerance     Decreased activities of daily living (ADL)     Recurrent major depressive disorder, in full remission     Chronic pain disorder      Physician: Snow Collazo NP    Date: 4/28/2022  Physician Orders: PT Eval and Treat   Medical Diagnosis from Referral:   G89.4 (ICD-10-CM) - Chronic pain disorder   Z78.9 (ICD-10-CM) - Decreased activities of daily living (ADL)   Z74.09 (ICD-10-CM) - Impaired functional mobility and activity tolerance         Evaluation Date: 3/15/2022  Authorization Period Expiration: 12/31/2022  Plan of Care Expiration: 6/10/2022  Visit # / Visits authorized: 6 / 20  FOTO: 2/ 3      Precautions: Standard, Diabetes and Fall     Time In:  2:45 pm  Time Out: 4:00 pm  Total Appointment Time (timed & untimed codes): 75 minutes        SUBJECTIVE     Yanni reports a physically stressful AM having to russo and dress her grandson leading to inc LBP.  Pt report performing controlled breathing to calm herself.    After last session pt attend Spiritism service and notes, in the future, would like to participate more in Spiritism activities.  Pt states inc pain /c extended standing and sitting limit her present level of service in the Spiritism.        Patient's FRP goals: improve her function so she can enjoy life again      Current 4/10  Location(s):  Midline LB "my whole body"      OBJECTIVE     Objective Measures updated at progress report unless specified.       Limitation/Restriction for FOTO lumbar Survey     Therapist reviewed FOTO scores for Yanni Kaiser on 3/15/2022.   FOTO documents entered into [employer] - see Media section.     Limitation " "Score: 49%  04/20/22: 71%       Predicted Score: 44%             Treatment       Yanni received the Individualized Treatments listed below:     therapeutic exercises to develop strength, endurance, ROM, flexibility, posture and core stabilization for 75 minutes including:      Peripheral muscle strengthening which included 1-2 sets of 10-20 repetitions at a slow, controlled 5-7 second per rep pace focused on strengthening supporting musculature for improved body mechanics & functional mobility.  Patient & therapist focused on proper form during treatment to ensure optimal strengthening of each targeted muscle group. Patients are instructed to keep sets/reps with proper load to stay at 3-5 out of 10 on the provided modified Froylan exertion scale.       BATCA Machine Exercises Weight (lbs) Sets Repetitions Froylan Exertion Scale Rating   Leg Extension        Hamstring Curls       Chest Press 25 1 20 5   Pec Fly 25 1 20 4   Lat Pull Down 27.5 1 20 4   Mid Row 27.5 1 20 4   Bicep Bar Curls       Standing Tricep Extension       Single Leg Press  R/L  32.5 1 20 5     Wall slides x 5, x 1 /c 5 sec hold     Semi recline   LTR x 10    SKTC x 10 each 5 sec hold /c strap assist    Seated EOB   SOC x 20 initial tactile cue for pelvic rotation  Standing   Pelvic tilts - dance cue x 10   Semi recline   PPT x 5 tactile cue for pelvic tilt, x 10 5 sec hold      Fitter board - mid setting, 20 tilt bouts   Fwd/Bwd    Trial 1- 1 hand  fade to finger tip     Trial 2   Lat    Trial 1- 1 hand  fade to palm    Trial 2- EC 1 hand  /c intermittent palm at dec tilt control        Stepping /c dynamic arm swing for ball toss/roll x 7 (knock down 6 "pins)  Stepping to kick ball x 20 alternating leg step to kick       Patient Education and Home Exercises     Home Exercises Provided and Patient Education Provided     Education provided:   - resistance exercise basics - pacing, naming     Written Home Exercises Provided: Patient " instructed to cont prior HEP. Exercises were reviewed and Yanni was able to demonstrate them prior to the end of the session.  Yanni demonstrated fair  understanding of the education provided. See EMR under Patient Instructions for information provided during therapy sessions    ASSESSMENT     Between yesterday and today, pt complete full complement of BATCA resistance exercises for full body activation.  Pt /c improved exercise recall and technique needing min cue for pacing.  Pt demo sig improved core activation completing improved pelvic tilts in varied positions.  Recommend cont performing core activation challenges to build progress for improved lumbopelvic stability during functional standing (Latter day per pt stated goal).  Simulate bowling and kicking performed for multiplanar dynamic activity /c SLS component and inc standing exercise time.  Pt complete /s inc discomfort.  Plan to cont inc standing time /c exercise for tolerance challenge.       Yanni Is progressing well towards her goals.   Pt prognosis is Fair.     Pt will continue to benefit from skilled outpatient physical therapy to address the deficits listed in the problem list box on initial evaluation, provide pt/family education and to maximize pt's level of independence in the home and community environment.     Pt's spiritual, cultural and educational needs considered and pt agreeable to plan of care and goals.     Anticipated barriers to physical therapy: chronic nature of issues, high BMI    GOALS: Pt is in agreement with the following goals:        Goal Progress Towards Goal   Short Term: In 3 weeks, pt will:     Verbalize & demonstrate good understanding of resisted training with 3-1-3 second rep for bmouuwaclm-vvapdiqij-ayczfadix contraction to encourage full muscle recruitment for strength & endurance Progress towards goal: initiated 3/15/2022   Verbalize & demonstrate good understanding of home stretch routine to improve AROM, help  decrease pain & improve mobility Progress towards goal: initiated 3/15/2022   Demonstrate proper supine or seated diaphragmatic breathing with decreased chest excursion & emphasis on full abdominal excursion & increased expiration time to promote muscle relaxation & increased parasympathetic activity to improve patient tolerance to activities Progress towards goal: initiated 3/15/2022   Demonstrate proper static standing posture in frontal & sagittal plane per PT visual assessment to decrease postural strain in ADLs Progress towards goal: initiated 3/15/2022   Demonstrate good recall of names of resisted exercises w/ minimal to moderate verbal cues to promote independence w/ exercise at the end of the program Progress towards goal: initiated 3/15/2022   Verbalize plan for continuing resisted exercises w/ specific location (i.e. commercial gym, home, community fitness center) indicating preference for machine-based or free-weight exercises to incorporate all major muscle groups to maintain & progress therapeutic functional improvements Progress towards goal: initiated 3/15/2022         Long Term: In 8 weeks, pt will:     Verbalize good understanding of activities planning/scheduling (stretching, mindfulness, resisted training, & cardio) prescribed by PT/OT following the end of the program to sustain & progress functional improvements Progress towards goal: initiated 3/15/2022   Be independent w/ selected resisted training w/ minimal to no cuing while performing to continue w/ resisted strengthening independently at the end of the program Progress towards goal: initiated 3/15/2022   Improve 2 Minute Walk Test (MWT) to 400 feet and 6 MWT to 1200 feet or greater to improve gait speed and mobility Progress towards goal: initiated 3/15/2022   Perform single leg stance 10 seconds or greater bilaterally to improve safety and balance in ADLs Progress towards goal: initiated 3/15/2022   Demonstrate Timed Up & Go (with  appropriate assistive device, if necessary) of 10 seconds or less to decrease fall risk and improve functional mobility Progress towards goal: initiated 3/15/2022   Demonstrate 5 time sit to stand test without upper extremity assist to 15 sec or less to improve general mobility and decrease fall risk Progress towards goal: initiated 3/15/2022   Score 44 % or less on Lumbar FOTO to indicate significant functional improvements in impaired functions secondary to low back pain Progress towards goal: initiated 3/15/2022          PLAN     Continue PT per POC     Ha Mitchell PT, DPT

## 2022-04-28 ENCOUNTER — CLINICAL SUPPORT (OUTPATIENT)
Dept: REHABILITATION | Facility: OTHER | Age: 52
End: 2022-04-28
Payer: MEDICARE

## 2022-04-28 ENCOUNTER — OFFICE VISIT (OUTPATIENT)
Dept: PSYCHIATRY | Facility: OTHER | Age: 52
End: 2022-04-28
Payer: MEDICARE

## 2022-04-28 DIAGNOSIS — Z78.9 ALTERATION IN PERFORMANCE OF ACTIVITIES OF DAILY LIVING: Primary | ICD-10-CM

## 2022-04-28 DIAGNOSIS — F40.298 FEAR OF PAIN: ICD-10-CM

## 2022-04-28 DIAGNOSIS — F43.29 ADJUSTMENT DISORDER WITH PHYSICAL COMPLAINTS: Primary | ICD-10-CM

## 2022-04-28 DIAGNOSIS — Z74.09 IMPAIRED FUNCTIONAL MOBILITY, BALANCE, GAIT, AND ENDURANCE: Primary | ICD-10-CM

## 2022-04-28 DIAGNOSIS — Z78.9 DECREASED ACTIVITIES OF DAILY LIVING (ADL): ICD-10-CM

## 2022-04-28 DIAGNOSIS — G89.4 CHRONIC PAIN DISORDER: ICD-10-CM

## 2022-04-28 DIAGNOSIS — F33.42 RECURRENT MAJOR DEPRESSIVE DISORDER, IN FULL REMISSION: ICD-10-CM

## 2022-04-28 DIAGNOSIS — Z74.09 IMPAIRED FUNCTIONAL MOBILITY AND ACTIVITY TOLERANCE: ICD-10-CM

## 2022-04-28 PROCEDURE — 90853 PR GROUP PSYCHOTHERAPY: ICD-10-PCS | Mod: ,,, | Performed by: SOCIAL WORKER

## 2022-04-28 PROCEDURE — 3052F PR MOST RECENT HEMOGLOBIN A1C LEVEL 8.0 - < 9.0%: ICD-10-PCS | Mod: CPTII,,, | Performed by: SOCIAL WORKER

## 2022-04-28 PROCEDURE — 3052F HG A1C>EQUAL 8.0%<EQUAL 9.0%: CPT | Mod: CPTII,,, | Performed by: SOCIAL WORKER

## 2022-04-28 PROCEDURE — 4010F PR ACE/ARB THEARPY RXD/TAKEN: ICD-10-PCS | Mod: CPTII,,, | Performed by: SOCIAL WORKER

## 2022-04-28 PROCEDURE — 90853 GROUP PSYCHOTHERAPY: CPT | Mod: ,,, | Performed by: SOCIAL WORKER

## 2022-04-28 PROCEDURE — 97110 THERAPEUTIC EXERCISES: CPT

## 2022-04-28 PROCEDURE — 4010F ACE/ARB THERAPY RXD/TAKEN: CPT | Mod: CPTII,,, | Performed by: SOCIAL WORKER

## 2022-04-28 PROCEDURE — 97530 THERAPEUTIC ACTIVITIES: CPT

## 2022-04-28 NOTE — PROGRESS NOTES
"[facility]   Functional Restoration Program  Occupational Therapy Daily Note  FRP Day 6      Name: Yanni Kaiser  Medical Record Number: [id number]    Therapy Diagnosis:   Encounter Diagnoses   Name Primary?    Alteration in performance of activities of daily living Yes    Fear of pain      Physician: Snow Collazo NP    Visit Date: 4/28/2022    Physician Orders: OT Eval and Treat  Medical Diagnosis: Chronic pain disorder  Evaluation Date: 3/15/2022  Insurance Authorization Period Expiration: 12/31/2022  Plan of Care Certification Period: 5/27/2022  Visit # / Visits authorized: 5 / 20  FOTO: evaluation: 60% limitation    Time In: 1:30  PM  Time Out: 2:45 PM  Total Billable Time: 75 minutes    Subjective     Yanni reports "my back is really hurting today".         Pain: 6 / 10  Location: middle low back and L leg     Personal Functional Three Month Goals: Performance and satisfaction noted below.    OBJECTIVE      Saray participated in the following dynamic functional therapeutic activities:     Individualized Treatment: Increased resistance levels of functional conditioning exercises according to personal recovery goals. Activities include: Dynamic reaching, eecep-sq-fhkai lifting, sustained standing activity, maximal lifting, 2-handed carry, sustained seated tasks, push and pull, waist-to-shoulder lift, box/container carry, endurance training. Daily functional conditioning progress is documented on a flow sheet which is available upon request.      Pt participated in functional therapeutic activities/ lifting/endurance activities in order to increase pt's participation with vocational, selfcare ADLs, and leisure activities in order to improve quality of life for 75 min.   Functional Activities:     Full body stretch w/ 3-10 sec hold technique &/or 3-5 repetition technique on all of the following:   Cervical flx/ext   Cervical sidebending   Cervical rotation   Cervical " "protraction/retraction   Shld rolls   Shld depression/elevation   Scapular retraction/protraction/scaption   Posterior shld stretch (cross arm)   Shld ER stretch (chicken wing)   Elbow flx/ext   Forearm supination/pronation   Wrist flx/ext   Open/close hands/fingers   Seated trunk rotations   Seated trunk/thoracic ext/flx   Seated marches & knee to chest   Seated knee flx/ext   Seated hip ER/piriformis stretch   Seated hamstring stretch   Seated toe raise to heel raise    Seated ankle circles each way   Standing lumbar extensions   Standing lateral leaning stretch   Standing quad stretch (UE support for balance)    Pt completed functional lifting, floor to waist, 3x5 reps x 8 lbs with exertion rated sort of hard (4 /10), focused education on activating glutes to stand, wide base of support, coordinating movement with breath and performing assisted flexion stretch at countertop. Verbal cues required for breathing. Carried conversation throughout activity re: having to lift her grandson up this morning off of the floor in the midst of a tantrum, noting extensor pattern and difficulty using proper mechanics but reporting awareness of body mechanics and attempts to use it as best as she could. Validated "functional mechanics" and importance of awareness of body mechanics despite situations where you cant use them 100% properly.       Pt participated in functional lift waist to shoulder, 15 reps x  10 lbs with a rating of hard (5/10). Pt educated on standing posture, core awareness, keeping shoulders depressed and retracted, stepping to place > reaching to place, keeping weight close to center of gravity and pacing as needed. Re-educated on staggered stance technique and coordinating movement with breath. Standing rest breaks taken 2/2 increased fatigue and hot flashes throughout activity.     Pt completed maximal lift x12 reps with 10 lbs, rated as hard (5 /10), continued education focused on keeping " weight close to center of gravity, maintain neutral spine positioning and pushing through heels for safety and activation of correct muscles, coordinating movement with breath, stepping to place > reaching to place. Noted rating activity 4/10 2/2 SOB not weight tolerance.     Pt completed seated mindfulness diaphragmatic and pursed lip breathing activity [id number] of each, with focus on posture, mechanics of breathing, and benefits re: PNS activation. Pt voiced and demonstrated understanding.     Pt completed dynamic reaching in various functional ranges, with education focused on standing posture, core awareness, hip rotation/weight shifting, slow and controlled movements and coordinating movement with breath. Completed 15 reps x 2 lbs, rated as hard (5/10) exertion. Performed standing shoulder stretch at wall.     Pt educated on proper mechanics to get on and off floor using chair for support. Pt educated on steps to take to ensure safety if she were to have a fall: roll to back, complete body scan to see if all body parts are okay, roll to side, hands/knees, crawl to chair and then use steps to get on/off floor. Pt demonstrated understanding x2reps. Pt with increased self-efficacy after completion of activity. Pt given handouts on proper mechanics. Pt rated as hard (5/10) exertion.      Pt completed yoga activity on mat, supine, quadriped, seated and standing, with focused education on getting on/off floor properly and safely, back mobility, hip flexibility, stress reduction, dynamic standing balance, posture, alignment and coordinating movement with breath, rated hard (5/10).    Pt noted increased pain in B hands. Educated on lymphatic drainage hand massage to perform with lotion as well as circular motion massages on pads of each finger. Demonstrated understanding.        Patient Education and Home Exercises     Home Exercises Provided and Patient Education Provided     Education provided:   -keke scale, pain  "cycle, z-lie, functional lifting mechanics, pursed lip and diaphragmatic breathing techniques    Written Home Exercises Provided: yes. Exercises were reviewed and Yanni was able to demonstrate them prior to the end of the session.  Yanni demonstrated good  understanding of the education provided. See EMR under Patient Instructions for information provided during therapy sessions    ASSESSMENT     Yanni Presents with continued increased fatigue as well as c/o increased back pain upon arrival. Despite this she continues with good motivation and determination and with continued willingness to participate. Overall she had good tolerance to session this date. Good continued response to all feedback and education and with good tolerance to weight increases with functional lifting tasks as well as getting on/off floor activity. Good response to yoga activity noting "my back feels better".  Continues to progress towards personal goals.     Yanni Is progressing well towards her goals.   Pt prognosis is Excellent.     Pt will continue to benefit from skilled outpatient occupational therapy to address the deficits listed in the problem list box on initial evaluation, provide pt/family education and to maximize pt's level of independence in the home and community environment.      Pt's spiritual, cultural and educational needs considered and pt agreeable to plan of care and goals.     FRP Goals: While working towards the long-term (3 month) functional goals listed above, Bobby accomplish the following short term goals during the 5-week intensive rehabilitation program. Yanni will:     1. Develop a viable return to community participation plan.   2. Demonstrate physical capacities consistent with a medium work demand level.   3. Demonstrate increased functional strength by lifting 20 lbs floor to waist and 20 lbs waist to shoulder in order to meet demand of work/home routine.   4. Increase her positional " tolerance to the level needed for work and home activities.   5. Implement self-care strategies during the program and at home to control pain.   6.   Patient will demonstrate use of mindfulness, stress management, and coping  strategies for improved participation in daily routine.      Specific patient expressed goals and demand levels:  Current Capacity Limit/ Physical  Demand Level (PDL)    Demand Levels of Functional Recovery Goals     Performance/Satisfaction  Day 1 Performance/Satisfaction  Day 10 Performance/Satisfaction  Follow-up      Light Physical Demand  (Based above tested results)     Vocational:  Attend /participate in [employer]     Recreational:  Walking      Increased socialization     Daily Living:  Cooking      Cleaning         Medium Physical Demand Level      1 / 1         1 / 0      1 / 0          3 / 1      1 / 1        The PDL listed above is based on today's physical performance screening test. More comprehensive physical  testing integrated with clinical findings would be required to derived an accurate work capacity.       PLAN     Continue per POC.    Mira Ryder OT, LOTR, 4/28/2022   Occupational Therapy

## 2022-04-28 NOTE — PROGRESS NOTES
Group Psychotherapy    Site: [facility]    Clinical status of patient: Outpatient    4/28/2022    Length of service:[id number]    Referred by: Functional Restoration Program     Number of patients in attendance: 5    Target symptoms: Chronic Pain Management     Patient's response to intervention:  The patient's response to intervention is active listening.    Progress toward goals and other mental status changes:  The patient's progress toward goals is good.    Plan: group psychotherapy    Topics Covered: Pt attended CBT for Chronic Pain as part of her participation in Functional Restoration. Topics discussed today included a discussion on vulnerability as a part of chronic pain management and how it impacts and is impacted by anxiety, depression and other mood disorders. We also discussed weekend homework, pts also watched Dr. Yaneli Quezada's DANA talk on vulnerability and discussed that. Will f/u in 2 weeks.

## 2022-05-02 ENCOUNTER — CLINICAL SUPPORT (OUTPATIENT)
Dept: REHABILITATION | Facility: OTHER | Age: 52
End: 2022-05-02
Payer: MEDICARE

## 2022-05-02 DIAGNOSIS — M54.42 CHRONIC LEFT-SIDED LOW BACK PAIN WITH LEFT-SIDED SCIATICA: ICD-10-CM

## 2022-05-02 DIAGNOSIS — Z74.09 IMPAIRED FUNCTIONAL MOBILITY AND ACTIVITY TOLERANCE: ICD-10-CM

## 2022-05-02 DIAGNOSIS — Z78.9 ALTERATION IN PERFORMANCE OF ACTIVITIES OF DAILY LIVING: Primary | ICD-10-CM

## 2022-05-02 DIAGNOSIS — F33.42 RECURRENT MAJOR DEPRESSIVE DISORDER, IN FULL REMISSION: ICD-10-CM

## 2022-05-02 DIAGNOSIS — F40.298 FEAR OF PAIN: ICD-10-CM

## 2022-05-02 DIAGNOSIS — Z78.9 DECREASED ACTIVITIES OF DAILY LIVING (ADL): ICD-10-CM

## 2022-05-02 DIAGNOSIS — G89.29 CHRONIC LEFT-SIDED LOW BACK PAIN WITH LEFT-SIDED SCIATICA: ICD-10-CM

## 2022-05-02 DIAGNOSIS — G89.4 CHRONIC PAIN DISORDER: ICD-10-CM

## 2022-05-02 DIAGNOSIS — Z74.09 IMPAIRED FUNCTIONAL MOBILITY, BALANCE, GAIT, AND ENDURANCE: Primary | ICD-10-CM

## 2022-05-02 PROCEDURE — 97530 THERAPEUTIC ACTIVITIES: CPT

## 2022-05-02 PROCEDURE — 97110 THERAPEUTIC EXERCISES: CPT

## 2022-05-02 NOTE — PROGRESS NOTES
[facility]    Functional Restoration Program  Physical Therapy Treatment Note  FRP Day 7    Name: Yanni Kaiser  MRN:[id number]      Therapy Diagnosis:   Encounter Diagnoses   Name Primary?    Impaired functional mobility, balance, gait, and endurance Yes    Impaired functional mobility and activity tolerance     Decreased activities of daily living (ADL)     Chronic left-sided low back pain with left-sided sciatica     Recurrent major depressive disorder, in full remission     Chronic pain disorder      Physician: Snow Collazo NP    Date: 5/2/2022  Physician Orders: PT Eval and Treat   Medical Diagnosis from Referral:   G89.4 (ICD-10-CM) - Chronic pain disorder   Z78.9 (ICD-10-CM) - Decreased activities of daily living (ADL)   Z74.09 (ICD-10-CM) - Impaired functional mobility and activity tolerance         Evaluation Date: 3/15/2022  Authorization Period Expiration: 12/31/2022  Plan of Care Expiration: 6/10/2022  Visit # / Visits authorized: 6 / 20  FOTO: 2/ 3      Precautions: Standard, Diabetes and Fall     Time In:  1:30p  Time Out: 2:45p  Total Appointment Time (timed & untimed codes): 75 minutes        SUBJECTIVE     Yanni reports she was exhausted this weekend. She did her stretches, but she stayed in bed to get some peace & quiet on Saturday because she had the house to herself. She is going to stop by [employer] this weekend as it is in the silver sneakers program for her [employer] membership & it is close to her home.      Patient's FRP goals: improve her function so she can enjoy life again      Current 6/10  Location(s): B low back, B feet      OBJECTIVE     Objective Measures updated at progress report unless specified.       Limitation/Restriction for FOTO lumbar Survey     Therapist reviewed FOTO scores for Yanni Kaiser on 3/15/2022.   FOTO documents entered into [employer] - see Media section.     Limitation Score: 49%  04/20/22: 71%        Predicted Score: 44%             Treatment       Yanni received the Individualized Treatments listed below:     therapeutic exercises to develop strength, endurance, ROM, flexibility, posture and core stabilization for 65 minutes including:    Full body stretch w/ 3-10 sec hold technique &/or 3-5 repetition technique on all of the following:   Cervical flx/ext   Cervical sidebending   Cervical rotation   Cervical protraction/retraction   Shld rolls   Shld depression/elevation   Scapular retraction/protraction/scaption   Posterior shld stretch (cross arm)   Shld ER stretch (chicken wing)   Elbow flx/ext   Forearm supination/pronation   Wrist flx/ext   Open/close hands/fingers   Seated trunk rotations   Seated trunk/thoracic ext/flx   Seated marches & knee to chest   Seated knee flx/ext   Seated hip ER/piriformis stretch   Seated hamstring stretch   Seated toe raise to heel raise    Seated ankle circles each way   Standing lumbar extensions   Standing lateral leaning stretch   Standing quad stretch (UE support for balance)        Peripheral muscle strengthening which included 1-2 sets of 10-20 repetitions at a slow, controlled 5-7 second per rep pace focused on strengthening supporting musculature for improved body mechanics & functional mobility.  Patient & therapist focused on proper form during treatment to ensure optimal strengthening of each targeted muscle group. Patients are instructed to keep sets/reps with proper load to stay at 3-5 out of 10 on the provided modified Froylan exertion scale.       BATCA Machine Exercises Weight (lbs) Sets Repetitions Froylan Exertion Scale Rating   Leg Extension  5 1 20 6   Hamstring Curls 20 1 20 5   Chest Press       Pec Fly       Lat Pull Down 27.5 1 20 4   Mid Row 27.5 1 20 4   Bicep Bar Curls 7.5 1 20 5   Standing Tricep Extension 15 1 20 4   Single Leg Press  R/L  30 1 20 5     · Bridge w/ belt 2x12  · Supine LTR 2x10  · Single knee to chest  using towel 2x30 sec  · Fitter board medium setting tilts each way 2x20     Yanni participated in dynamic functional therapeutic activities to improve functional performance for 10  minutes, including:    Diaphragmatic breathing:   · Verbal cues for unlabored, long & relaxed breathing w/ increased time for exhalation  · Awareness of pulling breath down away from mouth to hips  · Cues for for proper abdominal excursion & decreased use of accessory muscles of breathing (hand on stomach & on chest; increased stomach motion, decreased chest motion)  · Education on inhalation activating sympathetic nervous system   · Education on exhalation activating parasympathetic nervous system  · Performed supine & seated        Patient Education and Home Exercises     Home Exercises Provided and Patient Education Provided     Education provided:   - diaphragmatic breathing     Written Home Exercises Provided: Patient instructed to cont prior HEP. Exercises were reviewed and Yanni was able to demonstrate them prior to the end of the session.  Yanni demonstrated fair  understanding of the education provided. See EMR under Patient Instructions for information provided during therapy sessions    ASSESSMENT     Pt w/ good participation in PT today & demonstrated excellent diaphragmatic breathing supine & seated w/ minimal cuing. Based on her high report of pain & fatigue from the weekend, some of the exercises were reduced in weight to maintain appropriate Froylan rating. At lowest weight on machine for leg extension, she still had a 6/10 rating & reported it was mainly due to pain in the motion. Her form & technique was good & she only needed minimal cuing to slow her pace.    Yanni Is progressing well towards her goals.   Pt prognosis is Fair.     Pt will continue to benefit from skilled outpatient physical therapy to address the deficits listed in the problem list box on initial evaluation, provide pt/family education and to  maximize pt's level of independence in the home and community environment.     Pt's spiritual, cultural and educational needs considered and pt agreeable to plan of care and goals.     Anticipated barriers to physical therapy: chronic nature of issues, high BMI    GOALS: Pt is in agreement with the following goals:        Goal Progress Towards Goal   Short Term: In 3 weeks, pt will:     Verbalize & demonstrate good understanding of resisted training with 3-1-3 second rep for ezudlzwjum-ioqulmvwi-jrlluzfxr contraction to encourage full muscle recruitment for strength & endurance Progress towards goal: initiated 3/15/2022   Verbalize & demonstrate good understanding of home stretch routine to improve AROM, help decrease pain & improve mobility Progress towards goal: initiated 3/15/2022   Demonstrate proper supine or seated diaphragmatic breathing with decreased chest excursion & emphasis on full abdominal excursion & increased expiration time to promote muscle relaxation & increased parasympathetic activity to improve patient tolerance to activities Goal met 5/2/2022   Demonstrate proper static standing posture in frontal & sagittal plane per PT visual assessment to decrease postural strain in ADLs Progress towards goal: initiated 3/15/2022   Demonstrate good recall of names of resisted exercises w/ minimal to moderate verbal cues to promote independence w/ exercise at the end of the program Progress towards goal: initiated 3/15/2022   Verbalize plan for continuing resisted exercises w/ specific location (i.e. commercial gym, home, community fitness center) indicating preference for machine-based or free-weight exercises to incorporate all major muscle groups to maintain & progress therapeutic functional improvements Progress towards goal: initiated 3/15/2022         Long Term: In 8 weeks, pt will:     Verbalize good understanding of activities planning/scheduling (stretching, mindfulness, resisted training, &  cardio) prescribed by PT/OT following the end of the program to sustain & progress functional improvements Progress towards goal: initiated 3/15/2022   Be independent w/ selected resisted training w/ minimal to no cuing while performing to continue w/ resisted strengthening independently at the end of the program Progress towards goal: initiated 3/15/2022   Improve 2 Minute Walk Test (MWT) to 400 feet and 6 MWT to 1200 feet or greater to improve gait speed and mobility Progress towards goal: initiated 3/15/2022   Perform single leg stance 10 seconds or greater bilaterally to improve safety and balance in ADLs Progress towards goal: initiated 3/15/2022   Demonstrate Timed Up & Go (with appropriate assistive device, if necessary) of 10 seconds or less to decrease fall risk and improve functional mobility Progress towards goal: initiated 3/15/2022   Demonstrate 5 time sit to stand test without upper extremity assist to 15 sec or less to improve general mobility and decrease fall risk Progress towards goal: initiated 3/15/2022   Score 44 % or less on Lumbar FOTO to indicate significant functional improvements in impaired functions secondary to low back pain Progress towards goal: initiated 3/15/2022          PLAN     Continue PT per POC     Ugo Tim, PT, DPT

## 2022-05-02 NOTE — PROGRESS NOTES
"[facility]   Functional Restoration Program  Occupational Therapy Daily Note  FRP Day 7    Name: Yanni Kaiser  Medical Record Number: [id number]  Visit date: 5/2/2022    Therapy Diagnosis:   Encounter Diagnoses   Name Primary?    Alteration in performance of activities of daily living Yes    Fear of pain      Physician: Snow Collazo NP  Physician Orders: OT Eval and Treat  Medical Diagnosis: Chronic pain disorder  Evaluation Date: 3/15/2022  Insurance Authorization Period Expiration: 12/31/2022  Plan of Care Certification Period: 5/27/2022  Visit # / Visits authorized: 6 / 20  FOTO: evaluation: 60% limitation    Time In: 14:47  Time Out: 16:00  Total Billable Time: 73 minutes    Subjective     Yanni reports "my feet are killing me. I just made an appointment with my foot doctor. I think it's inflammation". "since I started it's been easier to pick things off the floor".        Pain: 4 / 10  Location: middle low back, L leg and feet.     Personal Functional Three Month Goals: Performance and satisfaction noted below.    OBJECTIVE      Individualized Treatment: Increased resistance levels of functional conditioning exercises according to personal recovery goals. Activities include: Dynamic reaching, nsvbz-rt-xoiyk lifting, sustained standing activity, maximal lifting, 2-handed carry, sustained seated tasks, push and pull, waist-to-shoulder lift, box/container carry, endurance training. Daily functional conditioning progress is documented on a flow sheet which is available upon request.      Yanni participated in dynamic, functional therapeutic activities/ lifting/endurance activities in order to increase pt's participation with vocational, selfcare ADLs, and leisure activities in order to improve quality of life for 73 minutes, including:     Yanni completed functional lifting, floor to waist, 15 reps x 9 lbs with exertion rated Sort of hard (4/10); focused " education on pacing during task performance. Took standing rest break midway to focus on deep breathing. Pt plans to use this lift related to picking up items at the house.     Yanni participated in functional lift waist to shoulder, 15 reps x 10 lbs with a rating of Sort of hard (4/10) exertion. Yanni educated on standing posture, core awareness, keeping shoulders depressed and retracted. Used staggered stance with weight shift back and forth to avoid reaching to place; cued to give self enough space to avoid over extension of spine. Stated that at home she will depend on taller family members to place items up if needed.     Yanni participated in endurance activity using treadmill for 16 minutes, starting at level 1.5 to 1.7 to improve functional endurance and progress towards increased MET level for improved community mobility and participation, rated as Moderate (3/10) exertion, Yanni re-educated on how to add mindfulness component to activity and how to pace appropriately by completed self check ins and grading activity as needed, with goal to reach moderate to sort of hard by end of activity. Completed .43 miles.    Yanni completed seated mindfulness diaphragmatic breathing using 4-7-8 technique x 5 reps of each, with focus on posture, mechanics of breathing, and benefits re: PNS activation. Yanni voiced and demonstrated understanding.     Yanni completed maximal lift 7 reps with 11 lbs, rated as Sort of hard (4/10) exertion, continued education focused on neutral spine positioning and pushing through heels for safety and activation of correct muscles.    Yanni completed dynamic reaching in various functional ranges, with continued focus on hip rotation/weight shifting, 15 reps x 2 lbs, rated as Sort of hard (4/10) exertion.     Yanni completed push and pull activity, simulating floor cleaning, x 5 minutes with education focused on the importance of shifting from the waist to reduce  strain on back, keeping elbow tucked to the side, maintaining upright standing posture and keeping shoulders away from the ears. Pt rated exertion as Sort of hard (4/10) exertion.     Patient Education and Home Exercises     Education provided:   - keke scale, pain cycle, z-lie, functional lifting mechanics, pursed lip and diaphragmatic breathing techniques  - 4-7-8 breathing technique.    Written Home Exercises Provided: Patient instructed to cont prior HEP. Exercises were reviewed and Yanni was able to demonstrate them prior to the end of the session.  Yanni demonstrated good  understanding of the education provided. See EMR under Patient Instructions for information provided during therapy sessions.    ASSESSMENT     Yanni presents with report of pain in feet today, but despite this, was able to tolerate all activities presented to her. Was open to feedback and education provided. Tendency to move quickly through task, with exertion of task related to work of breathing and technique, not as much as the weights used in activity. Overall, continues to progress towards personal goals.     Yanni Is progressing well towards her goals. Pt prognosis is Excellent.     Pt will continue to benefit from skilled outpatient occupational therapy to address the deficits listed in the problem list box on initial evaluation, provide pt/family education and to maximize pt's level of independence in the home and community environment.      Pt's spiritual, cultural and educational needs considered and pt agreeable to plan of care and goals.     FRP Goals: While working towards the long-term (3 month) functional goals listed above, Bobby accomplish the following short term goals during the 5-week intensive rehabilitation program. Yanni will:     1. Develop a viable return to community participation plan.   2. Demonstrate physical capacities consistent with a medium work demand level.   3. Demonstrate increased  functional strength by lifting 20 lbs floor to waist and 20 lbs waist to shoulder in order to meet demand of work/home routine.   4. Increase her positional tolerance to the level needed for work and home activities.   5. Implement self-care strategies during the program and at home to control pain.   6.   Patient will demonstrate use of mindfulness, stress management, and coping  strategies for improved participation in daily routine.      Specific patient expressed goals and demand levels:  Current Capacity Limit/ Physical  Demand Level (PDL)    Demand Levels of Functional Recovery Goals     Performance/Satisfaction  Day 1 Performance/Satisfaction  Day 10 Performance/Satisfaction  Follow-up      Light Physical Demand  (Based above tested results)     Vocational:  Attend /participate in [employer]     Recreational:  Walking      Increased socialization     Daily Living:  Cooking      Cleaning         Medium Physical Demand Level      1 / 1         1 / 0      1 / 0          3 / 1      1 / 1        The PDL listed above is based on the physical performance screening test completed at evaluation. More comprehensive physical testing integrated with clinical findings would be required to derived an accurate work capacity.    PLAN     Plan of care certification: 4/18/2022 to 5/27/2022.     Outpatient occupational therapy, 3 times a week for 5 weeks:      1. Functional conditioning daily to increase physical capacity and allow Ms. Kaiser to meet demands of vocation and home.   2. Individual counseling to develop return to work/daily routine plan and better understand the return to work process and/or daily routine restructure.   3. Daily Stretching, aerobic conditioning and walking to increase functional tolerance and allow Ms. Kaiser to meet demands of work and home.   4. Functional activities to increase ability to move fluidly and quickly and tolerate unguarded movements.   5. Re-education regarding proper postural, body  mechanics, and coping strategies for healthy roles and routine for managing daily stressors.    6. Any other treatment deemed necessary for patient to achieve personally driven goals to promote positive health and wellness and daily roles and routines    ANNA Collins/L, CEAS-I  5/2/2022

## 2022-05-03 DIAGNOSIS — F33.3 SEVERE EPISODE OF RECURRENT MAJOR DEPRESSIVE DISORDER, WITH PSYCHOTIC FEATURES: ICD-10-CM

## 2022-05-03 RX ORDER — MONTELUKAST SODIUM 10 MG/1
10 TABLET ORAL NIGHTLY
COMMUNITY
End: 2022-05-03 | Stop reason: SDUPTHER

## 2022-05-03 RX ORDER — MONTELUKAST SODIUM 10 MG/1
10 TABLET ORAL NIGHTLY
Qty: 90 TABLET | Refills: 1 | Status: SHIPPED | OUTPATIENT
Start: 2022-05-03 | End: 2022-09-09

## 2022-05-03 RX ORDER — QUETIAPINE FUMARATE 25 MG/1
25 TABLET, FILM COATED ORAL DAILY
Qty: 90 TABLET | Refills: 0 | Status: SHIPPED | OUTPATIENT
Start: 2022-05-03 | End: 2022-06-16 | Stop reason: SDUPTHER

## 2022-05-03 NOTE — TELEPHONE ENCOUNTER
No new care gaps identified.  [facility] Embedded Care Gaps. Reference number: [id number]. 5/03/2022   12:40:43 PM CDT

## 2022-05-03 NOTE — TELEPHONE ENCOUNTER
Phone pt informed that medication has been sent to the pharmacy and ready for   vs      Phone pt informed her the message was forward to doctor for refill on medication review.

## 2022-05-04 ENCOUNTER — CLINICAL SUPPORT (OUTPATIENT)
Dept: REHABILITATION | Facility: OTHER | Age: 52
End: 2022-05-04
Payer: MEDICARE

## 2022-05-04 DIAGNOSIS — Z74.09 IMPAIRED FUNCTIONAL MOBILITY, BALANCE, GAIT, AND ENDURANCE: Primary | ICD-10-CM

## 2022-05-04 DIAGNOSIS — G89.4 CHRONIC PAIN DISORDER: ICD-10-CM

## 2022-05-04 DIAGNOSIS — Z74.09 IMPAIRED FUNCTIONAL MOBILITY AND ACTIVITY TOLERANCE: ICD-10-CM

## 2022-05-04 DIAGNOSIS — F40.298 FEAR OF PAIN: ICD-10-CM

## 2022-05-04 DIAGNOSIS — Z78.9 ALTERATION IN PERFORMANCE OF ACTIVITIES OF DAILY LIVING: Primary | ICD-10-CM

## 2022-05-04 DIAGNOSIS — F33.42 RECURRENT MAJOR DEPRESSIVE DISORDER, IN FULL REMISSION: ICD-10-CM

## 2022-05-04 DIAGNOSIS — Z78.9 DECREASED ACTIVITIES OF DAILY LIVING (ADL): ICD-10-CM

## 2022-05-04 PROCEDURE — 97110 THERAPEUTIC EXERCISES: CPT

## 2022-05-04 PROCEDURE — 97530 THERAPEUTIC ACTIVITIES: CPT

## 2022-05-04 NOTE — PROGRESS NOTES
"[facility]   Functional Restoration Program  Occupational Therapy Daily Note  FRP Day 8    Name: Yanni Kaiser  Medical Record Number: [id number]  Visit date: 5/4/2022    Therapy Diagnosis:   Encounter Diagnoses   Name Primary?    Alteration in performance of activities of daily living Yes    Fear of pain      Physician: Snow Collazo NP  Physician Orders: OT Eval and Treat  Medical Diagnosis: Chronic pain disorder  Evaluation Date: 3/15/2022  Insurance Authorization Period Expiration: 12/31/2022  Plan of Care Certification Period: 5/27/2022  Visit # / Visits authorized: 7 / 20  FOTO: evaluation: 60% limitation    Time In: 1:30  Time Out: 2:45  Total Billable Time: 75 minutes    Subjective     Yanni reports "im tired".        Pain: 6 / 10  Location: middle low back     Personal Functional Three Month Goals: Performance and satisfaction noted below.    OBJECTIVE      Individualized Treatment: Increased resistance levels of functional conditioning exercises according to personal recovery goals. Activities include: Dynamic reaching, [id number] lifting, sustained standing activity, maximal lifting, 2-handed carry, sustained seated tasks, push and pull, waist-to-shoulder lift, box/container carry, endurance training. Daily functional conditioning progress is documented on a flow sheet which is available upon request.      Yanni participated in dynamic, functional therapeutic activities/ lifting/endurance activities in order to increase pt's participation with vocational, selfcare ADLs, and leisure activities in order to improve quality of life for 75 minutes, including:     Full body stretch w/ 3-10 sec hold technique &/or 3-5 repetition technique on all of the following:   Cervical flx/ext   Cervical sidebending   Cervical rotation   Cervical protraction/retraction   Shld rolls   Shld depression/elevation   Scapular retraction/protraction/scaption   Posterior " shld stretch (cross arm)   Shld ER stretch (chicken wing)   Elbow flx/ext   Forearm supination/pronation   Wrist flx/ext   Open/close hands/fingers   Seated trunk rotations   Seated trunk/thoracic ext/flx   Seated marches & knee to chest   Seated knee flx/ext   Seated hip ER/piriformis stretch   Seated hamstring stretch   Seated toe raise to heel raise    Seated ankle circles each way   Standing lumbar extensions   Standing lateral leaning stretch   Standing quad stretch (UE support for balance)    Yanni participated in functional lift waist to shoulder, 15 reps x 11 lbs with a rating of Sort of hard (4/10) exertion. Yanni educated on standing posture, core awareness, keeping shoulders depressed and retracted. Used staggered stance with weight shift back and forth to avoid reaching to place; cued to give self enough space to avoid over extension of spine. Stated that at home she will depend on taller family members to place items up if needed.     Yanni completed functional lifting, floor to waist, 15 reps x 10 lbs with exertion rated Hard (5/10); focused education on pacing during task performance. Took standing rest break midway to focus on deep breathing. Pt plans to use this lift related to picking up items at the house. With SOB post activity. Encouraged to take seated rest break.     Pt completed seated mindfulness diaphragmatic breathing activity [id number] with focus on posture, mechanics of breathing, and benefits re: PNS activation. Pt voiced and demonstrated understanding.     Yanni completed dynamic reaching in various functional ranges, with continued focus on core awareness, standing posture, hip rotation/weight shifting, slow and controlled moving, pacing as needed. Completed x15 reps x 2 lbs, rated as Sort of hard (4/10) exertion.      Yanni completed maximal lift 10 reps with 12 lbs, rated as Sort of hard (4/10) exertion, continued education focused on neutral spine  "positioning and pushing through heels for safety and activation of correct muscles. Continues to discuss increased pain in B feet. Made doc appointment with foot doctor for this month.     Yanni participated in endurance activity using [email or web address] for 10 minutes, starting at level 1.5 able able to progress to level 2.0, with goal to improve functional endurance and progress towards increased MET level for improved community mobility and participation, rated as hard (5/10), Yanni educated on how to add mindfulness component to activity and how to pace appropriately by completed self check ins and grading activity as needed, with goal to reach moderate to sort of hard by end of activity. Completed 1000 steps.     Pt educated on proper mechanics to get on and off floor using chair for support. Pt educated on steps to take to ensure safety if she were to have a fall: roll to back, complete body scan to see if all body parts are okay, roll to side, hands/knees, crawl to chair and then use steps to get on/off floor. Pt demonstrated understanding x2reps. Pt with increased self-efficacy after completion of activity. Pt given handouts on proper mechanics. Pt rated as modertate (3/10) exertion. Improved body awareness post review of mechanics noting feeling difference in pressure in knee when knee over toe v knee and ankle in line when getting up from floor. Able to complete mod I.      Pt completed yoga activity on mat, supine, quadriped, seated and standing, with focused education on getting on/off floor properly and safely, back mobility, hip flexibility, stress reduction, dynamic standing balance, posture, alignment and coordinating movement with breath, rated moderate (3/10). Added focus on hamstring flexibility and independent traction in back with chapis pose. Post activity noted "I feel good that really relaxed me".      Patient Education and Home Exercises     Education provided:   - keke scale, pain cycle, z-lie, " functional lifting mechanics, pursed lip and diaphragmatic breathing techniques  - 4-7-8 breathing technique.    Written Home Exercises Provided: Patient instructed to cont prior HEP. Exercises were reviewed and Yanni was able to demonstrate them prior to the end of the session.  Yanni demonstrated good  understanding of the education provided. See EMR under Patient Instructions for information provided during therapy sessions.    ASSESSMENT     Yanni presents with increased pain in back and feet and with continued fatigue 2/2 poor sleep. Despite this, was able to tolerate all activities presented to her with minimal breaks. Was open to feedback and education provided. Good response to yoga activity and with good improved independence with mechanics to get on/off floor. Overall, continues to progress towards personal goals.     Yanni Is progressing well towards her goals. Pt prognosis is Excellent.     Pt will continue to benefit from skilled outpatient occupational therapy to address the deficits listed in the problem list box on initial evaluation, provide pt/family education and to maximize pt's level of independence in the home and community environment.      Pt's spiritual, cultural and educational needs considered and pt agreeable to plan of care and goals.     FRP Goals: While working towards the long-term (3 month) functional goals listed above, Bobby accomplish the following short term goals during the 5-week intensive rehabilitation program. Yanni will:     1. Develop a viable return to community participation plan.   2. Demonstrate physical capacities consistent with a medium work demand level.   3. Demonstrate increased functional strength by lifting 20 lbs floor to waist and 20 lbs waist to shoulder in order to meet demand of work/home routine.   4. Increase her positional tolerance to the level needed for work and home activities.   5. Implement self-care strategies during the  program and at home to control pain.   6.   Patient will demonstrate use of mindfulness, stress management, and coping  strategies for improved participation in daily routine.      Specific patient expressed goals and demand levels:  Current Capacity Limit/ Physical  Demand Level (PDL)    Demand Levels of Functional Recovery Goals     Performance/Satisfaction  Day 1 Performance/Satisfaction  Day 10 Performance/Satisfaction  Follow-up      Light Physical Demand  (Based above tested results)     Vocational:  Attend /participate in Zoroastrianism     Recreational:  Walking      Increased socialization     Daily Living:  Cooking      Cleaning         Medium Physical Demand Level      1 / 1         1 / 0      1 / 0          3 / 1      1 / 1        The PDL listed above is based on the physical performance screening test completed at evaluation. More comprehensive physical testing integrated with clinical findings would be required to derived an accurate work capacity.    PLAN     Plan of care certification: 4/18/2022 to 5/27/2022.     Outpatient occupational therapy, 3 times a week for 5 weeks:      1. Functional conditioning daily to increase physical capacity and allow Ms. Kaiser to meet demands of vocation and home.   2. Individual counseling to develop return to work/daily routine plan and better understand the return to work process and/or daily routine restructure.   3. Daily Stretching, aerobic conditioning and walking to increase functional tolerance and allow Ms. Kaiser to meet demands of work and home.   4. Functional activities to increase ability to move fluidly and quickly and tolerate unguarded movements.   5. Re-education regarding proper postural, body mechanics, and coping strategies for healthy roles and routine for managing daily stressors.    6. Any other treatment deemed necessary for patient to achieve personally driven goals to promote positive health and wellness and daily roles and routines    Mira  Aleksey, OTR/L  5/4/2022

## 2022-05-04 NOTE — PROGRESS NOTES
[facility]  Functional Restoration Program  Physical Therapy Progress Report  FRP Day 9    Date: 5/5/2022   Name: Yanni Kaiser  Clinic Number: [id number]    Therapy Diagnosis:   Encounter Diagnoses   Name Primary?    Impaired functional mobility, balance, gait, and endurance Yes    Impaired functional mobility and activity tolerance     Decreased activities of daily living (ADL)     Recurrent major depressive disorder, in full remission     Chronic pain disorder      Physician: Snow Collazo NP    Physician Orders: PT Eval and Treat   Medical Diagnosis from Referral:   G89.4 (ICD-10-CM) - Chronic pain disorder   Z78.9 (ICD-10-CM) - Decreased activities of daily living (ADL)   Z74.09 (ICD-10-CM) - Impaired functional mobility and activity tolerance       Reassessment Date: 5/5/2022  Authorization Period Expiration: 06/30/2022  Plan of Care Expiration: 6/10/2022  Visit # / Visits authorized: 8/ 20  FOTO: completed 2/ 3     Precautions: Standard, Diabetes and Fall    Time In: 1:30 pm  Time Out: 2:45 pm  Total Appointment Time (timed & untimed codes): 75 minutes      Subjective     Patient reported history of current condition - Yanni reports fatigue throughout the day is her main limiting factor.  Pt note cont issues remaining asleep through the night leading to her inc fatigue.  Pt note at times waking to watch TV and trying to fall asleep /c TV on.    However, pt note improvement in amb tolerance reporting weekend walks exceeding 1 mile (> 15 min) and finding inc easy /c bending over to  objects.  Pt report that although no sig change in LB pain presentation, she performs stretches regularly and has enjoyed her yoga breathing time.    Pt still attending Vood[employer] events regularly and report interest in inc participation in V[employer] activities.             Patient's FRP goals: improve her function so she can enjoy life again        Pain:      Current 6/10, worst  10/10 , best 4/10   Location: low back   Easing Factors: pain gel, pain pill, lying down      Objective   Resting Vitals:  E  alessio 141/86 mmHg 97% O2 & 88bpm    Postural examination:  Seated: slouched head forward shifting side to side   Standing: head forward, increased lumbar lordosis, shift side to side    Range of Motion - Cervical   AROM AROM AROM    Evaluation Reassessment  Reassessment   Flexion 34 ° 33° °   Extension 40 ° 60° °   Side bending Right 32 ° 55° °   Side bending Left 30 ° 50° °   Rotation Right 50 ° Min loss °   Rotation Left 57 ° Min loss °     Range of Motion - Lumbar         ROM Loss ROM Loss ROM Loss   Flexion major loss Mod loss min curve reversal P! /c movement    Extension moderate loss WFL     Side bending Right moderate loss Min loss    Side bending Left moderate loss Min loss    Rotation Right moderate loss Min loss    Rotation Left minimal loss Min los       Strength Testing   Evaluation Reassessment Reassessment   Motion Tested         Lower Extremity R L R L R L   Hip Flexion 4-/5 3/5 4+/5 3+/5     Hip IR 3+/5 3/5 3+/5 3+/5     Hip ER 4-/5 3+/5 4+/5 4/5     Knee Extension 3+/5 3+/5 5/5 5/5     Knee Flexion 3+/5 3+/5 5/5 5/5        Functional Testing     Evaluation Reassessment  Reassessment   Timed Up and Go 15.4 sec 9.43 sec sec   5 Time Sit to Stand w/out UE 23.4 sec 13.54 sec sec   Single Limb Stance R LE 3.6 sec 21.26 sec sec   Single Limb Stance L LE 6.5 sec 31.64 sec sec   2 Minute Walk Test 282 feet 433.07 ft = 132 m feet   6 Minute Walk Test 797 feet 1320 ft = 402.51 m feet   Self Selected Walking Speed 0.66 m/sec 1.12 m/sec m/sec     GAIT:  Assistive Device used: none  Level of Assistance: independent  Patient displays the following gait deviations:  unsteady gait, decreased step length and decreased weight shift.     Minimum standards/norms:    TUG:  < 13.5 seconds/ Males 7.3 sec, Females 8.1 sec  SLS:  26-29 sec  Ages 20-69  Self Selected Walking Speed:  .4-.8m/sec  Limited  community ambulator                                                   .8- 1.2  Community ambulator                                                    1.2 m/sec and above  Able to safely cross streets        Limitation/Restriction for FOTO Lumbar Survey    Therapist reviewed FOTO scores for Yanni Kaiser on 5/5/2022.   FOTO documents entered into [employer] - see Media section.    Limitation Score: 49%  FRP Day 1: 71%   FRP Day  9: 42%    Predicted Score: 44%         TREATMENT        Yanni received the treatments listed below:         therapeutic exercises to develop strength, endurance, ROM, flexibility, posture and core stabilization for 75 minutes including:    PT reassessment (see above)     Supine   SLR x 10 R    Clamshells /c CL iso hold x 20 B Black TB   LTR x 20 B        PATIENT EDUCATION AND HOME EXERCISES     Education provided:   - Sleep hygiene /c TV usage     Written Home Exercises Provided: yes. Exercises were reviewed and Yanni was able to demonstrate them prior to the end of the session.  Yanni demonstrated good  understanding of the education provided. See EMR under Patient Instructions for information provided during therapy sessions.    ASSESSMENT   Vertrice subjective report indicate improved amb tolerance and functional mobility.  FOTO scores mirror this.  However, pt admit to challenges /c sleep quality that may be limiting her progression especially /c memory/exercise recall.  Pt ed on sleep hygiene tactics including regulating TV usage. Pt will need f/u for carry over /c possible improved memory.  Functional activity reassessment show sig improvement /c all measure meeting or exceeding goals thereby indicating sig improvement in strength, mobility and balance.  6MWT moved into community ambulator status indicating dec fall risk.  Pt also /c sig improved MMT /c likely improvement from resistance training adding evidence to the importance of cont progression for cont improved amb  tolerance and dec activity avoidance especially /c grandchildren that she watches.         Yanni Is progressing well towards her goals.   Pt prognosis is Fair.      Pt will continue to benefit from skilled outpatient physical therapy to address the deficits listed in the problem list box on initial evaluation, provide pt/family education and to maximize pt's level of independence in the home and community environment.      Pt's spiritual, cultural and educational needs considered and pt agreeable to plan of care and goals.     Anticipated barriers to physical therapy: chronic nature of issues, high BMI     GOALS: Pt is in agreement with the following goals:        Goal Progress Towards Goal   Short Term: In 3 weeks, pt will:     Verbalize & demonstrate good understanding of resisted training with 3-1-3 second rep for sguoioshan-wfeaapdlu-wcjtzngcx contraction to encourage full muscle recruitment for strength & endurance Progress towards goal: PROGRESSING 05/05/22   Verbalize & demonstrate good understanding of home stretch routine to improve AROM, help decrease pain & improve mobility Progress towards goal: PROGRESSING 05/05/22   Demonstrate proper supine or seated diaphragmatic breathing with decreased chest excursion & emphasis on full abdominal excursion & increased expiration time to promote muscle relaxation & increased parasympathetic activity to improve patient tolerance to activities Goal met 5/2/2022   Demonstrate proper static standing posture in frontal & sagittal plane per PT visual assessment to decrease postural strain in ADLs Progress towards goal: PROGRESSING 05/05/22   Demonstrate good recall of names of resisted exercises w/ minimal to moderate verbal cues to promote independence w/ exercise at the end of the program Progress towards goal: PROGRESSING 05/05/22   Verbalize plan for continuing resisted exercises w/ specific location (i.e. commercial gym, home, community fitness center) indicating  preference for machine-based or free-weight exercises to incorporate all major muscle groups to maintain & progress therapeutic functional improvements Progress towards goal: PROGRESSING 05/05/22         Long Term: In 8 weeks, pt will:     Verbalize good understanding of activities planning/scheduling (stretching, mindfulness, resisted training, & cardio) prescribed by PT/OT following the end of the program to sustain & progress functional improvements Progress towards goal: PROGRESSING 05/05/22   Be independent w/ selected resisted training w/ minimal to no cuing while performing to continue w/ resisted strengthening independently at the end of the program Progress towards goal: PROGRESSING 05/05/22   Improve 2 Minute Walk Test (MWT) to 400 feet and 6 MWT to 1200 feet or greater to improve gait speed and mobility Progress towards goal: MET 05/05/22      Perform single leg stance 10 seconds or greater bilaterally to improve safety and balance in ADLs Progress towards goal: MET 05/05/22      Demonstrate Timed Up & Go (with appropriate assistive device, if necessary) of 10 seconds or less to decrease fall risk and improve functional mobility Progress towards goal: MET 05/05/22      Demonstrate 5 time sit to stand test without upper extremity assist to 15 sec or less to improve general mobility and decrease fall risk Progress towards goal: MET 05/05/22      Score 44 % or less on Lumbar FOTO to indicate significant functional improvements in impaired functions secondary to low back pain Progress towards goal: MET 05/05/22   TO BE RETESTED          PLAN   Plan of care Certification: 4/18/2022 to 6/10/2022.    Outpatient Physical Therapy 3 times weekly for 2 weeks to include the following interventions: Cervical/Lumbar Traction, Electrical Stimulation per PT, Gait Training, Manual Therapy, Moist Heat/ Ice, Neuromuscular Re-ed, Patient Education, Therapeutic Activities and Therapeutic Exercise.         Following this, pt  to go on hold for minimum of 3 weeks to attempt independence w/ HEP & PT activities, then return for reassessment.     Ha Mitchell, PT, DPT

## 2022-05-04 NOTE — TELEPHONE ENCOUNTER
Provider states they will restart Odactra at patient's next appointment on 6/7. Pending refill accordingly. Will deliver directly to [facility] again. Spoke with patient and informed her of the plan.

## 2022-05-04 NOTE — PROGRESS NOTES
[facility]    Functional Restoration Program  Physical Therapy Treatment Note  FRP Day 8    Name: Yanni Kaiser  MRN:[id number]      Therapy Diagnosis:   Encounter Diagnoses   Name Primary?    Impaired functional mobility, balance, gait, and endurance Yes    Impaired functional mobility and activity tolerance     Decreased activities of daily living (ADL)     Recurrent major depressive disorder, in full remission     Chronic pain disorder      Physician: Snow Collazo NP    Date: 5/4/2022  Physician Orders: PT Eval and Treat   Medical Diagnosis from Referral:   G89.4 (ICD-10-CM) - Chronic pain disorder   Z78.9 (ICD-10-CM) - Decreased activities of daily living (ADL)   Z74.09 (ICD-10-CM) - Impaired functional mobility and activity tolerance         Evaluation Date: 3/15/2022  Authorization Period Expiration: 12/31/2022  Plan of Care Expiration: 6/10/2022  Visit # / Visits authorized: 7 / 20  FOTO: 2/ 3      Precautions: Standard, Diabetes and Fall     Time In:  2:45 pm  Time Out: 4:00 pm  Total Appointment Time (timed & untimed codes): 75 minutes        SUBJECTIVE     Yanni reports attending younger family member birthday party yesterday /c extended walking and having some fatigue today.  However, pt report waking today /c no physical discomfort more than typical.  Pt report over the last few weeks her daily step count inc to avg 4k.    During tx session, pt state she plans to take a fluid pill as she feels some body changes - joint fullness, ankle swelling, foot discomfort.  Pt report stopping her fluid pills but not sure if MD directed this or not.    Pt report tolerating last session well /c no excess discomfort.  Pt agree to tx today.          Patient's FRP goals: improve her function so she can enjoy life again      Current 6/10  Location(s): B low back, L knee       OBJECTIVE     Objective Measures updated at progress report unless specified.        Limitation/Restriction for FOTO lumbar Survey     Therapist reviewed FOTO scores for Yanni Kaiser on 3/15/2022.   FOTO documents entered into [employer] - see Media section.     Limitation Score: 49%  04/20/22: 71%       Predicted Score: 44%             Treatment       Yanni received the Individualized Treatments listed below:     therapeutic exercises to develop strength, endurance, ROM, flexibility, posture and core stabilization for 75 minutes including:    Fitter board - hard setting, 30 tilt bouts              Fwd/Bwd                          Trial 1- high guard /c dec tilt control regress to 1 hand finger                           Trial 2- EC, 1 hand palmar               Lat                          Trial 1- 1 hand palmar                            Trial 2- EC 1 hand  fade to palmar     Standing to wall    Calf stretch 2 x 15 sec hold B, soleus stretch 1 x 10 sec hold B      Steps 8 in 45 bpm    RLE lead/RLE down 45 sec, 20 sec standing rest break, 30 sec bout     d/c 2/2 L knee discomfort      Peripheral muscle strengthening which included 1-2 sets of 10-20 repetitions at a slow, controlled 5-7 second per rep pace focused on strengthening supporting musculature for improved body mechanics & functional mobility.  Patient & therapist focused on proper form during treatment to ensure optimal strengthening of each targeted muscle group. Patients are instructed to keep sets/reps with proper load to stay at 3-5 out of 10 on the provided modified Froylan exertion scale.       [employer] Machine Exercises Weight (lbs) Sets Repetitions Froylan Exertion Scale Rating   Leg Extension        Hamstring Curls       Chest Press 25 1 20 4   Pec Fly 25 1 20 4   Lat Pull Down       Mid Row       Bicep Bar Curls       Standing Tricep Extension       Single Leg Press  R/L  30 1 20 4/5       Soccer   LLE step to /c RLE kick and LLE SLS on follow thru x 10 3/10 accuracy     Supine   SAQ L knee x 20 3 sec hold     Steps  "6 in pt chosen pace, EO     RLE lead/RLE down 60 sec, 20 sec standing rest break,    LLE lead/LLE down 60 sec    Soccer   RLE step to /c LLE kick and RLE SLS on follow thru x 10 3/10 accuracy     Mini lunges (< 2 ft step length)    Lat x 15 B    Fwd x 15 each RLE and LLE lead     Patient Education and Home Exercises     Home Exercises Provided and Patient Education Provided     Education provided:     Written Home Exercises Provided: Patient instructed to cont prior HEP. Exercises were reviewed and Yanni was able to demonstrate them prior to the end of the session.  Vertrice demonstrated fair  understanding of the education provided. See EMR under Patient Instructions for information provided during therapy sessions    ASSESSMENT     Pt subjective report indicate improved standing and amb tolerance.  Pt demo good participation today performing all rx exercises despite initially reporting inc L knee discomfort indicating no fear avoidance.  Today challenged standing and BLE strength /c sig inc standing time for exercises.  SAQ performed to limit potential pain inhibition and address cont deficit /c leg extension. Pt complete mini lunges to complete session indicating improved BLE strength and core stability as pt /c no LOB and /c controlled movements.  Plan functional activity reassessment next visit per FRP programming. Expecting to see functional activity measures improvement at reassessment.    Pt advised to speak /c MD about d/c any pills relating to her BP or "fluids".      Yanni Is progressing well towards her goals.   Pt prognosis is Fair.     Pt will continue to benefit from skilled outpatient physical therapy to address the deficits listed in the problem list box on initial evaluation, provide pt/family education and to maximize pt's level of independence in the home and community environment.     Pt's spiritual, cultural and educational needs considered and pt agreeable to plan of care and goals.   "   Anticipated barriers to physical therapy: chronic nature of issues, high BMI    GOALS: Pt is in agreement with the following goals:        Goal Progress Towards Goal   Short Term: In 3 weeks, pt will:     Verbalize & demonstrate good understanding of resisted training with 3-1-3 second rep for [id number]-[id number] contraction to encourage full muscle recruitment for strength & endurance Progress towards goal: initiated 3/15/2022   Verbalize & demonstrate good understanding of home stretch routine to improve AROM, help decrease pain & improve mobility Progress towards goal: initiated 3/15/2022   Demonstrate proper supine or seated diaphragmatic breathing with decreased chest excursion & emphasis on full abdominal excursion & increased expiration time to promote muscle relaxation & increased parasympathetic activity to improve patient tolerance to activities Goal met 5/2/2022   Demonstrate proper static standing posture in frontal & sagittal plane per PT visual assessment to decrease postural strain in ADLs Progress towards goal: initiated 3/15/2022   Demonstrate good recall of names of resisted exercises w/ minimal to moderate verbal cues to promote independence w/ exercise at the end of the program Progress towards goal: initiated 3/15/2022   Verbalize plan for continuing resisted exercises w/ specific location (i.e. commercial gym, home, community fitness center) indicating preference for machine-based or free-weight exercises to incorporate all major muscle groups to maintain & progress therapeutic functional improvements Progress towards goal: initiated 3/15/2022         Long Term: In 8 weeks, pt will:     Verbalize good understanding of activities planning/scheduling (stretching, mindfulness, resisted training, & cardio) prescribed by PT/OT following the end of the program to sustain & progress functional improvements Progress towards goal: initiated 3/15/2022   Be independent w/ selected resisted  training w/ minimal to no cuing while performing to continue w/ resisted strengthening independently at the end of the program Progress towards goal: initiated 3/15/2022   Improve 2 Minute Walk Test (MWT) to 400 feet and 6 MWT to 1200 feet or greater to improve gait speed and mobility Progress towards goal: initiated 3/15/2022   Perform single leg stance 10 seconds or greater bilaterally to improve safety and balance in ADLs Progress towards goal: initiated 3/15/2022   Demonstrate Timed Up & Go (with appropriate assistive device, if necessary) of 10 seconds or less to decrease fall risk and improve functional mobility Progress towards goal: initiated 3/15/2022   Demonstrate 5 time sit to stand test without upper extremity assist to 15 sec or less to improve general mobility and decrease fall risk Progress towards goal: initiated 3/15/2022   Score 44 % or less on Lumbar FOTO to indicate significant functional improvements in impaired functions secondary to low back pain Progress towards goal: initiated 3/15/2022          PLAN     Continue PT per POC     Ha Mitchell, PT, DPT

## 2022-05-05 ENCOUNTER — CLINICAL SUPPORT (OUTPATIENT)
Dept: REHABILITATION | Facility: OTHER | Age: 52
End: 2022-05-05
Payer: MEDICARE

## 2022-05-05 ENCOUNTER — PATIENT MESSAGE (OUTPATIENT)
Dept: BARIATRICS | Facility: CLINIC | Age: 52
End: 2022-05-05
Payer: MEDICARE

## 2022-05-05 DIAGNOSIS — Z74.09 IMPAIRED FUNCTIONAL MOBILITY AND ACTIVITY TOLERANCE: ICD-10-CM

## 2022-05-05 DIAGNOSIS — F33.42 RECURRENT MAJOR DEPRESSIVE DISORDER, IN FULL REMISSION: ICD-10-CM

## 2022-05-05 DIAGNOSIS — Z74.09 IMPAIRED FUNCTIONAL MOBILITY, BALANCE, GAIT, AND ENDURANCE: Primary | ICD-10-CM

## 2022-05-05 DIAGNOSIS — Z78.9 ALTERATION IN PERFORMANCE OF ACTIVITIES OF DAILY LIVING: Primary | ICD-10-CM

## 2022-05-05 DIAGNOSIS — Z78.9 DECREASED ACTIVITIES OF DAILY LIVING (ADL): ICD-10-CM

## 2022-05-05 DIAGNOSIS — G89.4 CHRONIC PAIN DISORDER: ICD-10-CM

## 2022-05-05 DIAGNOSIS — F40.298 FEAR OF PAIN: ICD-10-CM

## 2022-05-05 PROCEDURE — 97530 THERAPEUTIC ACTIVITIES: CPT

## 2022-05-05 PROCEDURE — 97110 THERAPEUTIC EXERCISES: CPT

## 2022-05-05 NOTE — PROGRESS NOTES
"[facility]   Functional Restoration Program  Occupational Therapy Progress Report  FRP Day 9   NOTE: This is a duplicate copy utilized for reassessment purposes & continuity of care.  See pt's plan of care for co-signed copy.    Name: Yanni Kaiser  Medical Record Number: [id number]  Visit date: 5/5/2022    Therapy Diagnosis:   Encounter Diagnoses   Name Primary?    Alteration in performance of activities of daily living Yes    Fear of pain      Physician: Snow Collazo NP  Physician Orders: OT Eval and Treat  Medical Diagnosis: Chronic pain disorder  Evaluation Date: 3/15/2022  Insurance Authorization Period Expiration: 12/31/2022  Plan of Care Certification Period: 7/15/2022 (UPDATED 5/5/22)  Visit # / Visits authorized: 8 / 20  FOTO: evaluation: 60% limitation  FOTO: reassessment: 35% limitation    Time In: 2:50 PM  Time Out: 4:00 PM  Total Billable Time: 70 minutes    Subjective     Yanni reports "my back is hurting today especially after laying on that table with PT".    "I feel good. You know, I learned how to bend down right and  things, I stretch more and walk more and I have walked faithfully every Friday and saturday since starting this program. So I want to get myself into a gym and keep my walking going. Up until now I didn't feel like anyone understood and I was alone. Its good to be in a program where I can see other people going through the same things and knowing i'm not alone. I don't have to keep it all in anymore"         Pain:   Current: 7 / 10  Worst: 9 / 10  Best: 4 / 10  Location: middle low back     Social and ADL History:  Activities of Daily Living Checklist:   Key to Score:   0: Unable to do the activity  1: Can do the activity with great difficulty  2: Can do the activity with little difficulty   3: No problem with activity  N/A: This is not an activity I do  H/T: Have not tried yet     Personal Care:  3/15/22  5/5/22 Homemaking:  "  3/15/22  5/5/22   Dressing Upper Body:  2 / 3 Meal preparation:    Dressing Lower Body:   Kitchen Cleanup:    Bathin / 2 Childcare:     Hair Care:  3 / 3 Grocery shoppin   Sleep:   Vacuuming/moppin / 2       Emptying trash/ garbage ba   General Mobility:    Bed making changin / 2   Sittin / 2 Laundry     Bendin / 2       Getting in/out of bed:   Home Maintenance:     Standin / 2 Mowing, raking:  na   Walkin / 2 Gardening:  na   Twistin / 2 Home Repairs:  na   Liftin / 2 Painting:  na             Getting around:          Getting in and out of car:         Buckling up you seat belt:  2 / 3       Opening heavy doors:  1 / 3       Climbing/descending stairs:                      Personal Functional Three Month Goals: Performance and satisfaction noted below.    OBJECTIVE      Individualized Treatment: Increased resistance levels of functional conditioning exercises according to personal recovery goals. Activities include: Dynamic reaching, afvwn-hq-zkdlp lifting, sustained standing activity, maximal lifting, 2-handed carry, sustained seated tasks, push and pull, waist-to-shoulder lift, box/container carry, endurance training.      Yanni participated in dynamic, functional therapeutic activities/ lifting/endurance activities in order to increase pt's participation with vocational, selfcare ADLs, and leisure activities in order to improve quality of life for 75 minutes, including:     Strength Testing             Evaluation  3/15/22 [place] FRP   22 2 Month Follow Up   Motion Tested                 R L R L R L   Upper Extremity               Shoulder Flexion 4+/5 4/5 5/5   5/5        Shoulder Extension 5/5 5/5 5/5  5/5        Shoulder Abduction 5/5 5/5 5/5  5/5        Shoulder IR 5/5 5/5 5/5  5/5        Shoulder ER 5/5 5/5 5/5  5/5        Elbow Flexion 5/5 5/5 5/5  5/5        Elbow Extension 5/5 5/5 5/5  5/5                 Evaluation 3/15/22 ; Reassess 5/5/22   5 times sit-stand  (adults 18-65 y/o) 23.44 seconds ; 13.5 seconds  >12 sec= fall risk for general elderly  >16 sec= fall risk for Parkinson's disease  >10 sec= balance/vestibular dysfunction (<59 y/o)  >14.2 sec= balance/vestibular dysfunction (>59 y/o)  >12 sec= fall risk for CVA      Endurance Testing: Modified Ramp Treadmill Test    GAP   3/15/22 Re-assessment  5/5/22 Follow-up   Minutes Completed   3 mins 00 secs  4 min     % Grades  10 %  10 %     Speed (mph)  1.7 mph  2.1 mph     METS 4  5      bpm  136 bpm     Froylan Rating Perceived Exertion Scale   7  6     Reason for stop point: Pt reported posture changes, fatigue, noted decline in maintaining pace safely.      Functional Strength Test:  Pile Testing: Lifting  (Pounds/Heart rate)   GAP/Eval (#/HR/FROYLAN)  3/15/22  Reassessment  Day 9  5/5/22   Follow-up   Appointment    Repetitive Floor to Waist      10#/116/6 20#/110/4         Repetitive Waist to Shoulder    15#/100/4   20#/92/4           1- Time Maximum     15#/102/4 30#/96/5          Carry-2 Handed (40ft)     17#/113/4 25#/89/4          Work demand Level     Light   light-medium        Reason for Stop point: Increased time required for completing repetitions. During physical testing, participation level consistent.     No environmental, cultural, spiritual, developmental or education needs expressed or noted.     Limitation/Restriction for FOTO NOC Survey     Therapist reviewed FOTO scores for Yanni Kaiser on 5/5/2022.   FOTO documents entered into [employer] - see Media section.     Limitation Score eval 3/15/22: 60%  Limitation Score reassessment: 35%        Pt completed seated mindfulness diaphragmatic and pursed lip breathing activity [id number] with focus on posture, mechanics of breathing, and benefits re: PNS activation. Pt voiced and demonstrated understanding.     Yanni completed dynamic reaching in various functional ranges, with  "continued focus on core awareness, standing posture, hip rotation/weight shifting, slow and controlled moving, pacing as needed. Completed x15 reps x 2 lbs, rated as sort of hard (4/10) exertion.      Pt re-educated on proper mechanics to get on and off floor using chair for support. Pt educated on steps to take to ensure safety if she were to have a fall: roll to back, complete body scan to see if all body parts are okay, roll to side, hands/knees, crawl to chair and then use steps to get on/off floor. Pt demonstrated understanding x2reps. Pt with increased self-efficacy after completion of activity. Pt given handouts on proper mechanics. Pt rated as modertate (3/10) exertion. Improved body awareness post review of mechanics noting feeling difference in pressure in knee when knee over toe v knee and ankle in line when getting up from floor. Able to complete mod I.      Pt completed yoga activity on mat, supine, quadriped, seated and standing, with focused education on getting on/off floor properly and safely, back mobility, hip flexibility, stress reduction, dynamic standing balance, posture, alignment and coordinating movement with breath, rated moderate (3/10). Continued focus on hamstring flexibility and independent traction in back with chapis pose. Post activity noted "Im going to start doing this with my granddaughter at home she loves yoga and she loves to meditate so shell love to do this with me".      Patient Education and Home Exercises     Education provided:   - keke scale, pain cycle, z-lie, functional lifting mechanics, pursed lip and diaphragmatic breathing techniques  - 4-7-8 breathing technique.    Written Home Exercises Provided: Patient instructed to cont prior HEP. Exercises were reviewed and Yanni was able to demonstrate them prior to the end of the session.  Vertrice demonstrated good  understanding of the education provided. See EMR under Patient Instructions for information provided " during therapy sessions.    ASSESSMENT     Yanni presents with continued fatigue and increased pain in back post PT session. Despite this, she made great progress on both objective and subjective measures throughout reassessment. Notably, she improved 5x sit to stand time by 10 seconds, progressed her met level from 4 to 5 as well as her work demand level from light to light-medium. Subjectively she had improvements in all personal goals as noted by mod COPM chart below. She discussed good improvements in compliance with HEP and walking program as well as stretching and breathing and mindfulness and overall notes increased functional activity tolerance. Overall, continues to progress towards personal goals.     Yanni Is progressing well towards her goals. Pt prognosis is Excellent.     Pt will continue to benefit from skilled outpatient occupational therapy to address the deficits listed in the problem list box on initial evaluation, provide pt/family education and to maximize pt's level of independence in the home and community environment.      Pt's spiritual, cultural and educational needs considered and pt agreeable to plan of care and goals.     FRP Goals: While working towards the long-term (3 month) functional goals listed above, [place] accomplish the following short term goals during the 5-week intensive rehabilitation program. Yanni will:     1. Develop a viable return to community participation plan. ------------progressing 5/5/22  2. Demonstrate physical capacities consistent with a medium work demand level. ------------progressing 5/5/22  3. Demonstrate increased functional strength by lifting 20 lbs floor to waist and 20 lbs waist to shoulder in order to meet demand of work/home routine. ------------MET 5/5/22  4. Increase her positional tolerance to the level needed for work and home activities. ------------progressing 5/5/22  5. Implement self-care strategies during the program and at  home to control pain. ------------progressing 5/5/22  6.   Patient will demonstrate use of mindfulness, stress management, and coping  strategies for improved participation in daily routine. ------------progressing 5/5/22    UPDATED GOALS 5/5/22:  1.Demonstrate increased functional strength by lifting 30 lbs floor to waist and 25 lbs waist to shoulder in order to meet demand of work/home routine      Specific patient expressed goals and demand levels:  Current Capacity Limit/ Physical  Demand Level (PDL)    Demand Levels of Functional Recovery Goals     Performance/Satisfaction  Day 1 Performance/Satisfaction  Day 10 Performance/Satisfaction  Follow-up      Light Physical Demand  (Based above tested results)     Vocational:  Attend /participate in [employer]     Recreational:  Walking      Increased socialization     Daily Living:  Cooking      Cleaning         Medium Physical Demand Level      1 / 1         1 / 0      1 / 0          3 / 1      1 / 1      5 / 2         5 / 5     4 / 3         3 / 3     5 / 5     The PDL listed above is based on the physical performance screening test completed at evaluation. More comprehensive physical testing integrated with clinical findings would be required to derived an accurate work capacity.    PLAN   UPDATED plan of care certification 5/5/2022 to 7/15/2022    Outpatient occupational therapy, 3 x/wks for 2 additional weeks. Following this, pt to go on hold for a minimum of 7 weeks to attempt independence w/ Home Activity Plan (HAP) & PT activities, and will then return for reassessment.      Mira Ryder, OTR/L  5/5/2022

## 2022-05-05 NOTE — PLAN OF CARE
"[facility]   Functional Restoration Program  Occupational Therapy Progress Report  FRP Day 9     Name: Yanni Kaiser  Medical Record Number: [id number]  Visit date: 2022    Therapy Diagnosis:   Encounter Diagnoses   Name Primary?    Alteration in performance of activities of daily living Yes    Fear of pain      Physician: Snow Collazo NP  Physician Orders: OT Eval and Treat  Medical Diagnosis: Chronic pain disorder  Evaluation Date: 3/15/2022  Insurance Authorization Period Expiration: 2022  Plan of Care Certification Period: 7/15/2022 (UPDATED 22)  Visit # / Visits authorized:   FOTO: evaluation: 60% limitation  FOTO: reassessment: 35% limitation    Time In: 2:50 PM  Time Out: 4:00 PM  Total Billable Time: 70 minutes    Subjective     Efrainmichelleivan reports "my back is hurting today especially after laying on that table with PT".    "I feel good. You know, I learned how to bend down right and  things, I stretch more and walk more and I have walked faithfully every Friday and saturday since starting this program. So I want to get myself into a gym and keep my walking going. Up until now I didn't feel like anyone understood and I was alone. Its good to be in a program where I can see other people going through the same things and knowing i'm not alone. I don't have to keep it all in anymore"         Pain:   Current: 7 / 10  Worst: 9 / 10  Best: 4 / 10  Location: middle low back     Social and ADL History:  Activities of Daily Living Checklist:   Key to Score:   0: Unable to do the activity  1: Can do the activity with great difficulty  2: Can do the activity with little difficulty   3: No problem with activity  N/A: This is not an activity I do  H/T: Have not tried yet     Personal Care:  3/15/22  5/5/22 Homemaking:   3/15/22  5/5/22   Dressing Upper Body:  2 / 3 Meal preparation: 1 / 2   Dressing Lower Body:  1 / 2 Kitchen Cleanup:  / 2   Bathin "  Childcare:     Hair Care:  3 / 3 Grocery shoppin   Sleep:   Vacuuming/moppin / 2       Emptying trash/ garbage ba   General Mobility:    Bed making changin / 2   Sittin / 2 Laundry     Bendin / 2       Getting in/out of bed:   Home Maintenance:     Standin / 2 Mowing, raking:  na   Walkin / 2 Gardening:  na   Twistin / 2 Home Repairs:  na   Liftin / 2 Painting:  na             Getting around:          Getting in and out of car:         Buckling up you seat belt:  2 / 3       Opening heavy doors:  1 / 3       Climbing/descending stairs:                      Personal Functional Three Month Goals: Performance and satisfaction noted below.    OBJECTIVE      Individualized Treatment: Increased resistance levels of functional conditioning exercises according to personal recovery goals. Activities include: Dynamic reaching, gakaj-ee-xnpsx lifting, sustained standing activity, maximal lifting, 2-handed carry, sustained seated tasks, push and pull, waist-to-shoulder lift, box/container carry, endurance training.      Yanni participated in dynamic, functional therapeutic activities/ lifting/endurance activities in order to increase pt's participation with vocational, selfcare ADLs, and leisure activities in order to improve quality of life for 75 minutes, including:     Strength Testing             Evaluation  3/15/22 [place] FRP   22 2 Month Follow Up   Motion Tested                 R L R L R L   Upper Extremity               Shoulder Flexion 4+/5 4/5 5/5   5/5        Shoulder Extension 5/5 5/5 5/5  5/5        Shoulder Abduction 5/5 5/5 5/5  5/5        Shoulder IR 5/5 5/5 5/5  5/5        Shoulder ER 5/5 5/5 5/5  5/5        Elbow Flexion 5/5 5/5 5/5  5/5        Elbow Extension 5/5 5/5 5/5  5/5                Evaluation 3/15/22 ; Reassess 22   5 times sit-stand  (adults 18-65 y/o) 23.44 seconds ; 13.5 seconds  >12 sec= fall risk  for general elderly  >16 sec= fall risk for Parkinson's disease  >10 sec= balance/vestibular dysfunction (<59 y/o)  >14.2 sec= balance/vestibular dysfunction (>59 y/o)  >12 sec= fall risk for CVA      Endurance Testing: Modified Ramp Treadmill Test    GAP   3/15/22 Re-assessment  5/5/22 Follow-up   Minutes Completed   3 mins 00 secs  4 min     % Grades  10 %  10 %     Speed (mph)  1.7 mph  2.1 mph     METS 4  5      bpm  136 bpm     Froylan Rating Perceived Exertion Scale   7  6     Reason for stop point: Pt reported posture changes, fatigue, noted decline in maintaining pace safely.      Functional Strength Test:  Pile Testing: Lifting  (Pounds/Heart rate)   GAP/Eval (#/HR/FROYLAN)  3/15/22  Reassessment  Day 9  5/5/22   Follow-up   Appointment    Repetitive Floor to Waist      10#/116/6 20#/110/4         Repetitive Waist to Shoulder    15#/100/4   20#/92/4           1- Time Maximum     15#/102/4 30#/96/5          Carry-2 Handed (40ft)     17#/113/4 25#/89/4          Work demand Level     Light   light-medium        Reason for Stop point: Increased time required for completing repetitions. During physical testing, participation level consistent.     No environmental, cultural, spiritual, developmental or education needs expressed or noted.     Limitation/Restriction for FOTO NOC Survey     Therapist reviewed FOTO scores for Yanni Kaiser on 5/5/2022.   FOTO documents entered into [employer] - see Media section.     Limitation Score eval 3/15/22: 60%  Limitation Score reassessment: 35%        Pt completed seated mindfulness diaphragmatic and pursed lip breathing activity [id number] with focus on posture, mechanics of breathing, and benefits re: PNS activation. Pt voiced and demonstrated understanding.     Yanni completed dynamic reaching in various functional ranges, with continued focus on core awareness, standing posture, hip rotation/weight shifting, slow and controlled moving, pacing as needed. Completed x15  "reps x 2 lbs, rated as sort of hard (4/10) exertion.      Pt re-educated on proper mechanics to get on and off floor using chair for support. Pt educated on steps to take to ensure safety if she were to have a fall: roll to back, complete body scan to see if all body parts are okay, roll to side, hands/knees, crawl to chair and then use steps to get on/off floor. Pt demonstrated understanding x2reps. Pt with increased self-efficacy after completion of activity. Pt given handouts on proper mechanics. Pt rated as modertate (3/10) exertion. Improved body awareness post review of mechanics noting feeling difference in pressure in knee when knee over toe v knee and ankle in line when getting up from floor. Able to complete mod I.      Pt completed yoga activity on mat, supine, quadriped, seated and standing, with focused education on getting on/off floor properly and safely, back mobility, hip flexibility, stress reduction, dynamic standing balance, posture, alignment and coordinating movement with breath, rated moderate (3/10). Continued focus on hamstring flexibility and independent traction in back with chapis pose. Post activity noted "Im going to start doing this with my granddaughter at home she loves yoga and she loves to meditate so shell love to do this with me".      Patient Education and Home Exercises     Education provided:   - keke scale, pain cycle, z-lie, functional lifting mechanics, pursed lip and diaphragmatic breathing techniques  - 4-7-8 breathing technique.    Written Home Exercises Provided: Patient instructed to cont prior HEP. Exercises were reviewed and Yanni was able to demonstrate them prior to the end of the session.  Yanni demonstrated good  understanding of the education provided. See EMR under Patient Instructions for information provided during therapy sessions.    ASSESSMENT     Yanni presents with continued fatigue and increased pain in back post PT session. Despite this, she " made great progress on both objective and subjective measures throughout reassessment. Notably, she improved 5x sit to stand time by 10 seconds, progressed her met level from 4 to 5 as well as her work demand level from light to light-medium. Subjectively she had improvements in all personal goals as noted by mod COPM chart below. She discussed good improvements in compliance with HEP and walking program as well as stretching and breathing and mindfulness and overall notes increased functional activity tolerance. Overall, continues to progress towards personal goals.     Yanni Is progressing well towards her goals. Pt prognosis is Excellent.     Pt will continue to benefit from skilled outpatient occupational therapy to address the deficits listed in the problem list box on initial evaluation, provide pt/family education and to maximize pt's level of independence in the home and community environment.      Pt's spiritual, cultural and educational needs considered and pt agreeable to plan of care and goals.     FRP Goals: While working towards the long-term (3 month) functional goals listed above, [place] accomplish the following short term goals during the 5-week intensive rehabilitation program. Yanni will:     1. Develop a viable return to community participation plan. ------------progressing 5/5/22  2. Demonstrate physical capacities consistent with a medium work demand level. ------------progressing 5/5/22  3. Demonstrate increased functional strength by lifting 20 lbs floor to waist and 20 lbs waist to shoulder in order to meet demand of work/home routine. ------------MET 5/5/22  4. Increase her positional tolerance to the level needed for work and home activities. ------------progressing 5/5/22  5. Implement self-care strategies during the program and at home to control pain. ------------progressing 5/5/22  6.   Patient will demonstrate use of mindfulness, stress management, and coping  strategies  for improved participation in daily routine. ------------progressing 5/5/22    UPDATED GOALS 5/5/22:  1.Demonstrate increased functional strength by lifting 30 lbs floor to waist and 25 lbs waist to shoulder in order to meet demand of work/home routine      Specific patient expressed goals and demand levels:  Current Capacity Limit/ Physical  Demand Level (PDL)    Demand Levels of Functional Recovery Goals     Performance/Satisfaction  Day 1 Performance/Satisfaction  Day 10 Performance/Satisfaction  Follow-up      Light Physical Demand  (Based above tested results)     Vocational:  Attend /participate in Taoist     Recreational:  Walking      Increased socialization     Daily Living:  Cooking      Cleaning         Medium Physical Demand Level      1 / 1         1 / 0      1 / 0          3 / 1      1 / 1      5 / 2         5 / 5     4 / 3         3 / 3     5 / 5     The PDL listed above is based on the physical performance screening test completed at evaluation. More comprehensive physical testing integrated with clinical findings would be required to derived an accurate work capacity.    PLAN   UPDATED plan of care certification 5/5/2022 to 7/15/2022    Outpatient occupational therapy, 3 x/wks for 2 additional weeks. Following this, pt to go on hold for a minimum of 7 weeks to attempt independence w/ Home Activity Plan (HAP) & PT activities, and will then return for reassessment.      Mira Ryder, OTR/L  5/5/2022

## 2022-05-06 NOTE — PLAN OF CARE
[facility]  Functional Restoration Program  Physical Therapy Progress Report  FRP Day 9    Date: 5/5/2022   Name: Yanni Kaiser  Clinic Number: [id number]    Therapy Diagnosis:   Encounter Diagnoses   Name Primary?    Impaired functional mobility, balance, gait, and endurance Yes    Impaired functional mobility and activity tolerance     Decreased activities of daily living (ADL)     Recurrent major depressive disorder, in full remission     Chronic pain disorder      Physician: Snow Collazo NP    Physician Orders: PT Eval and Treat   Medical Diagnosis from Referral:   G89.4 (ICD-10-CM) - Chronic pain disorder   Z78.9 (ICD-10-CM) - Decreased activities of daily living (ADL)   Z74.09 (ICD-10-CM) - Impaired functional mobility and activity tolerance       Reassessment Date: 5/5/2022  Authorization Period Expiration: 06/30/2022  Plan of Care Expiration: 6/10/2022  Visit # / Visits authorized: 8/ 20  FOTO: completed 2/ 3     Precautions: Standard, Diabetes and Fall    Time In: 1:30 pm  Time Out: 2:45 pm  Total Appointment Time (timed & untimed codes): 75 minutes      Subjective     Patient reported history of current condition - Yanni reports fatigue throughout the day is her main limiting factor.  Pt note cont issues remaining asleep through the night leading to her inc fatigue.  Pt note at times waking to watch TV and trying to fall asleep /c TV on.    However, pt note improvement in amb tolerance reporting weekend walks exceeding 1 mile (> 15 min) and finding inc easy /c bending over to  objects.  Pt report that although no sig change in LB pain presentation, she performs stretches regularly and has enjoyed her yoga breathing time.    Pt still attending Confucianist events regularly and report interest in inc participation in Confucianist activities.             Patient's FRP goals: improve her function so she can enjoy life again        Pain:      Current 6/10, worst  10/10 , best 4/10   Location: low back   Easing Factors: pain gel, pain pill, lying down      Objective   Resting Vitals:  E  alessio 141/86 mmHg 97% O2 & 88bpm    Postural examination:  Seated: slouched head forward shifting side to side   Standing: head forward, increased lumbar lordosis, shift side to side    Range of Motion - Cervical   AROM AROM AROM    Evaluation Reassessment  Reassessment   Flexion 34 ° 33° °   Extension 40 ° 60° °   Side bending Right 32 ° 55° °   Side bending Left 30 ° 50° °   Rotation Right 50 ° Min loss °   Rotation Left 57 ° Min loss °     Range of Motion - Lumbar         ROM Loss ROM Loss ROM Loss   Flexion major loss Mod loss min curve reversal P! /c movement    Extension moderate loss WFL     Side bending Right moderate loss Min loss    Side bending Left moderate loss Min loss    Rotation Right moderate loss Min loss    Rotation Left minimal loss Min los       Strength Testing   Evaluation Reassessment Reassessment   Motion Tested         Lower Extremity R L R L R L   Hip Flexion 4-/5 3/5 4+/5 3+/5     Hip IR 3+/5 3/5 3+/5 3+/5     Hip ER 4-/5 3+/5 4+/5 4/5     Knee Extension 3+/5 3+/5 5/5 5/5     Knee Flexion 3+/5 3+/5 5/5 5/5        Functional Testing     Evaluation Reassessment  Reassessment   Timed Up and Go 15.4 sec 9.43 sec sec   5 Time Sit to Stand w/out UE 23.4 sec 13.54 sec sec   Single Limb Stance R LE 3.6 sec 21.26 sec sec   Single Limb Stance L LE 6.5 sec 31.64 sec sec   2 Minute Walk Test 282 feet 433.07 ft = 132 m feet   6 Minute Walk Test 797 feet 1320 ft = 402.51 m feet   Self Selected Walking Speed 0.66 m/sec 1.12 m/sec m/sec     GAIT:  Assistive Device used: none  Level of Assistance: independent  Patient displays the following gait deviations:  unsteady gait, decreased step length and decreased weight shift.     Minimum standards/norms:    TUG:  < 13.5 seconds/ Males 7.3 sec, Females 8.1 sec  SLS:  26-29 sec  Ages 20-69  Self Selected Walking Speed:  .4-.8m/sec  Limited  community ambulator                                                   .8- 1.2  Community ambulator                                                    1.2 m/sec and above  Able to safely cross streets        Limitation/Restriction for FOTO Lumbar Survey    Therapist reviewed FOTO scores for Yanni Kaiser on 5/5/2022.   FOTO documents entered into [employer] - see Media section.    Limitation Score: 49%  FRP Day 1: 71%   FRP Day  9: 42%    Predicted Score: 44%         TREATMENT        Ynani received the treatments listed below:         therapeutic exercises to develop strength, endurance, ROM, flexibility, posture and core stabilization for 75 minutes including:    PT reassessment (see above)     Supine   SLR x 10 R    Clamshells /c CL iso hold x 20 B Black TB   LTR x 20 B        PATIENT EDUCATION AND HOME EXERCISES     Education provided:   - Sleep hygiene /c TV usage     Written Home Exercises Provided: yes. Exercises were reviewed and Yanni was able to demonstrate them prior to the end of the session.  Yanni demonstrated good  understanding of the education provided. See EMR under Patient Instructions for information provided during therapy sessions.    ASSESSMENT   Vertrice subjective report indicate improved amb tolerance and functional mobility.  FOTO scores mirror this.  However, pt admit to challenges /c sleep quality that may be limiting her progression especially /c memory/exercise recall.  Pt ed on sleep hygiene tactics including regulating TV usage. Pt will need f/u for carry over /c possible improved memory.  Functional activity reassessment show sig improvement /c all measure meeting or exceeding goals thereby indicating sig improvement in strength, mobility and balance.  6MWT moved into community ambulator status indicating dec fall risk.  Pt also /c sig improved MMT /c likely improvement from resistance training adding evidence to the importance of cont progression for cont improved amb  tolerance and dec activity avoidance especially /c grandchildren that she watches.         Yanni Is progressing well towards her goals.   Pt prognosis is Fair.      Pt will continue to benefit from skilled outpatient physical therapy to address the deficits listed in the problem list box on initial evaluation, provide pt/family education and to maximize pt's level of independence in the home and community environment.      Pt's spiritual, cultural and educational needs considered and pt agreeable to plan of care and goals.     Anticipated barriers to physical therapy: chronic nature of issues, high BMI     GOALS: Pt is in agreement with the following goals:        Goal Progress Towards Goal   Short Term: In 3 weeks, pt will:     Verbalize & demonstrate good understanding of resisted training with 3-1-3 second rep for uwycrxqwgo-chqcxllij-drtnyrhtu contraction to encourage full muscle recruitment for strength & endurance Progress towards goal: PROGRESSING 05/05/22   Verbalize & demonstrate good understanding of home stretch routine to improve AROM, help decrease pain & improve mobility Progress towards goal: PROGRESSING 05/05/22   Demonstrate proper supine or seated diaphragmatic breathing with decreased chest excursion & emphasis on full abdominal excursion & increased expiration time to promote muscle relaxation & increased parasympathetic activity to improve patient tolerance to activities Goal met 5/2/2022   Demonstrate proper static standing posture in frontal & sagittal plane per PT visual assessment to decrease postural strain in ADLs Progress towards goal: PROGRESSING 05/05/22   Demonstrate good recall of names of resisted exercises w/ minimal to moderate verbal cues to promote independence w/ exercise at the end of the program Progress towards goal: PROGRESSING 05/05/22   Verbalize plan for continuing resisted exercises w/ specific location (i.e. commercial gym, home, community fitness center) indicating  preference for machine-based or free-weight exercises to incorporate all major muscle groups to maintain & progress therapeutic functional improvements Progress towards goal: PROGRESSING 05/05/22         Long Term: In 8 weeks, pt will:     Verbalize good understanding of activities planning/scheduling (stretching, mindfulness, resisted training, & cardio) prescribed by PT/OT following the end of the program to sustain & progress functional improvements Progress towards goal: PROGRESSING 05/05/22   Be independent w/ selected resisted training w/ minimal to no cuing while performing to continue w/ resisted strengthening independently at the end of the program Progress towards goal: PROGRESSING 05/05/22   Improve 2 Minute Walk Test (MWT) to 400 feet and 6 MWT to 1200 feet or greater to improve gait speed and mobility Progress towards goal: MET 05/05/22      Perform single leg stance 10 seconds or greater bilaterally to improve safety and balance in ADLs Progress towards goal: MET 05/05/22      Demonstrate Timed Up & Go (with appropriate assistive device, if necessary) of 10 seconds or less to decrease fall risk and improve functional mobility Progress towards goal: MET 05/05/22      Demonstrate 5 time sit to stand test without upper extremity assist to 15 sec or less to improve general mobility and decrease fall risk Progress towards goal: MET 05/05/22      Score 44 % or less on Lumbar FOTO to indicate significant functional improvements in impaired functions secondary to low back pain Progress towards goal: MET 05/05/22   TO BE RETESTED          PLAN   Plan of care Certification: 4/18/2022 to 6/10/2022.    Outpatient Physical Therapy 3 times weekly for 2 weeks to include the following interventions: Cervical/Lumbar Traction, Electrical Stimulation per PT, Gait Training, Manual Therapy, Moist Heat/ Ice, Neuromuscular Re-ed, Patient Education, Therapeutic Activities and Therapeutic Exercise.         Following this, pt  to go on hold for minimum of 3 weeks to attempt independence w/ HEP & PT activities, then return for reassessment.     Ha Mitchell, PT, DPT

## 2022-05-09 ENCOUNTER — PATIENT OUTREACH (OUTPATIENT)
Dept: ADMINISTRATIVE | Facility: HOSPITAL | Age: 52
End: 2022-05-09
Payer: MEDICARE

## 2022-05-09 NOTE — PROGRESS NOTES
A1C Non-Compliant gap report - labs not due at this time, future appointment scheduled on 06/20/22.    Eye exam updated.    Colonoscopy - A request was sent today for patient's record. I spoke w/ Echo, she will fax over the results to our office.

## 2022-05-10 ENCOUNTER — PATIENT MESSAGE (OUTPATIENT)
Dept: BARIATRICS | Facility: CLINIC | Age: 52
End: 2022-05-10
Payer: MEDICARE

## 2022-05-11 ENCOUNTER — CLINICAL SUPPORT (OUTPATIENT)
Dept: REHABILITATION | Facility: OTHER | Age: 52
End: 2022-05-11
Payer: MEDICARE

## 2022-05-11 DIAGNOSIS — F40.298 FEAR OF PAIN: ICD-10-CM

## 2022-05-11 DIAGNOSIS — G89.29 CHRONIC LEFT-SIDED LOW BACK PAIN WITH SCIATICA, SCIATICA LATERALITY UNSPECIFIED: ICD-10-CM

## 2022-05-11 DIAGNOSIS — F33.42 RECURRENT MAJOR DEPRESSIVE DISORDER, IN FULL REMISSION: ICD-10-CM

## 2022-05-11 DIAGNOSIS — Z74.09 IMPAIRED FUNCTIONAL MOBILITY AND ACTIVITY TOLERANCE: ICD-10-CM

## 2022-05-11 DIAGNOSIS — Z78.9 ALTERATION IN PERFORMANCE OF ACTIVITIES OF DAILY LIVING: Primary | ICD-10-CM

## 2022-05-11 DIAGNOSIS — M54.40 CHRONIC LEFT-SIDED LOW BACK PAIN WITH SCIATICA, SCIATICA LATERALITY UNSPECIFIED: ICD-10-CM

## 2022-05-11 DIAGNOSIS — G89.4 CHRONIC PAIN DISORDER: ICD-10-CM

## 2022-05-11 DIAGNOSIS — Z78.9 DECREASED ACTIVITIES OF DAILY LIVING (ADL): ICD-10-CM

## 2022-05-11 DIAGNOSIS — Z74.09 IMPAIRED FUNCTIONAL MOBILITY, BALANCE, GAIT, AND ENDURANCE: Primary | ICD-10-CM

## 2022-05-11 PROCEDURE — 97530 THERAPEUTIC ACTIVITIES: CPT

## 2022-05-11 PROCEDURE — 97110 THERAPEUTIC EXERCISES: CPT

## 2022-05-11 NOTE — PROGRESS NOTES
"[facility]   Functional Restoration Program  Occupational Therapy Daily Note  FRP Day 11    Name: Yanni Kaiser  Medical Record Number: [id number]  Visit date: 5/11/2022    Therapy Diagnosis:   Encounter Diagnoses   Name Primary?    Alteration in performance of activities of daily living Yes    Fear of pain      Physician: Snow Collazo NP  Physician Orders: OT Eval and Treat  Medical Diagnosis: Chronic pain disorder  Evaluation Date: 3/15/2022  Insurance Authorization Period Expiration: 12/31/2022  Plan of Care Certification Period: 5/27/2022  Visit # / Visits authorized: 8 / 20  FOTO: evaluation: 60% limitation    Time In: 1:30   Time Out: 2:45  Total Billable Time: 75 minutes    Subjective     Yanni reports "I feel bad today but I did yoga and mindfulness over the weekend".        Pain: 5 / 10  Location: middle low back     Personal Functional Three Month Goals: Performance and satisfaction noted below.    OBJECTIVE      Individualized Treatment: Increased resistance levels of functional conditioning exercises according to personal recovery goals. Activities include: Dynamic reaching, ynwqb-hl-[id number] lifting, sustained standing activity, maximal lifting, 2-handed carry, sustained seated tasks, push and pull, waist-to-shoulder lift, box/container carry, endurance training. Daily functional conditioning progress is documented on a flow sheet which is available upon request.      Yanni participated in dynamic, functional therapeutic activities/ lifting/endurance activities in order to increase pt's participation with vocational, selfcare ADLs, and leisure activities in order to improve quality of life for 75 minutes, including:     Full body stretch w/ 3-10 sec hold technique &/or 3-5 repetition technique on all of the following:   Cervical flx/ext   Cervical sidebending   Cervical rotation   Cervical protraction/retraction   Shld rolls   Shld " "depression/elevation   Scapular retraction/protraction/scaption   Posterior shld stretch (cross arm)   Shld ER stretch (chicken wing)   Elbow flx/ext   Forearm supination/pronation   Wrist flx/ext   Open/close hands/fingers   Seated trunk rotations   Seated trunk/thoracic ext/flx   Seated marches & knee to chest   Seated knee flx/ext   Seated hip ER/piriformis stretch   Seated hamstring stretch   Seated toe raise to heel raise    Seated ankle circles each way   Standing lumbar extensions   Standing lateral leaning stretch   Standing quad stretch (UE support for balance)    Yanni completed functional lifting, floor to waist, 3x5 reps x 10 lbs with exertion rated hard (5/10); focused education on pacing during task performance. Took standing rest break midway to focus on deep breathing. Pt plans to use this lift related to picking up items at the house. With SOB post activity. Encouraged to take seated rest break. During activity discussed improved self-efficacy with healthy choices re: swapping soft drinks for water, and eating fruits and vegetables everyday. She noted "all I drink now is lemon water, even I cant believe that". Post activity noted SOB.     Yanni participated in functional lift waist to shoulder, 3x5 reps x 11 lbs with a rating of Sort of hard (4/10) exertion. Yanni re-educated on standing posture, core awareness, keeping shoulders depressed and retracted. Increased fatigue throughout activity but with good motivation and use of standing stretches as needed.    Yanni completed maximal lift 2x5 reps with 14 lbs, rated as hard (5/10) exertion, continued education focused on neutral spine positioning and pushing through heels for safety and activation of correct muscles. Continues to discuss increased pain in B feet but notes that pain has decreased.     Pt completed seated mindfulness diaphragmatic breathing activity [id number] with focus on posture, mechanics of breathing, " and benefits re: PNS activation. Pt voiced and demonstrated understanding.     Yanni completed dynamic reaching in various functional ranges, with continued focus on core awareness, standing posture, hip rotation/weight shifting, slow and controlled moving, pacing as needed. Completed x15 reps x 2 lbs, rated as hard (5/10) exertion.      Pt completed standing task x 10 minutes at upright peg board to progress personal goal of increased standing tolerance to cook and increase participation in [employer]. While standing, completed BUE functional endurance activity by alternating BUE to place and remove pegs on board. Focused education on posture, weight shifting with single crate in front of feet as needed, keeping slight bend in knees and stretching as needed, rated easy (2/10) exertion. Added focus on pursed lip breathing throughout activity 2/2 increased SOB due to congestion.     Yanni participated in endurance activity using treadmill for 10 minutes, starting at level 1.0mph  to 1.5 to improve functional endurance and progress towards increased MET level for improved community mobility and participation, rated as Moderate (3/10) exertion, Yanni re-educated on how to add mindfulness component to activity and how to pace appropriately by completed self check ins and grading activity as needed, with goal to reach moderate to sort of hard by end of activity.    Pt educated on proper mechanics to get on and off floor using chair for support. Pt educated on steps to take to ensure safety if she were to have a fall: roll to back, complete body scan to see if all body parts are okay, roll to side, hands/knees, crawl to chair and then use steps to get on/off floor. Pt demonstrated understanding x2reps. Pt with increased self-efficacy after completion of activity. Pt given handouts on proper mechanics. Pt rated as modertate (3/10) exertion. Improved body awareness post review of mechanics noting feeling difference in  "pressure in knee when knee over toe v knee and ankle in line when getting up from floor. Able to complete mod I.      Pt completed yoga activity on mat, supine, quadriped, seated and standing, with focused education on getting on/off floor properly and safely, back mobility, hip flexibility, stress reduction, dynamic standing balance, posture, alignment and coordinating movement with breath, rated hard (5/10). Added focus on hamstring flexibility and independent traction in back with chapis pose. Post activity she noted "It was more challenging mentally than physically"     Patient Education and Home Exercises     Education provided:   - keke scale, pain cycle, z-lie, functional lifting mechanics, pursed lip and diaphragmatic breathing techniques  - 4-7-8 breathing technique.    Written Home Exercises Provided: Patient instructed to cont prior HEP. Exercises were reviewed and Yanni was able to demonstrate them prior to the end of the session.  Yanni demonstrated good  understanding of the education provided. See EMR under Patient Instructions for information provided during therapy sessions.    ASSESSMENT     Yanni presents with increased fatigue and SOB upon arrival, noting increased congestion. Despite this, she noted good compliance with FRP homework over the weekend and with good healthy swaps re: water intake and fruits and veggies. Good tolerance to sessio this date, although required increased time to complete activities 2/2 fatigue, but with good continued motivation and willingness to participate. Noted improved pain in B feet.  Good continued response to yoga activity and with good improved independence with mechanics to get on/off floor. Overall, continues to progress towards personal goals.     Yanni Is progressing well towards her goals. Pt prognosis is Excellent.     Pt will continue to benefit from skilled outpatient occupational therapy to address the deficits listed in the problem list " box on initial evaluation, provide pt/family education and to maximize pt's level of independence in the home and community environment.      Pt's spiritual, cultural and educational needs considered and pt agreeable to plan of care and goals.     FRP Goals: While working towards the long-term (3 month) functional goals listed above, Bobby accomplish the following short term goals during the 5-week intensive rehabilitation program. Yanni will:     1. Develop a viable return to community participation plan. ------------progressing 5/5/22  2. Demonstrate physical capacities consistent with a medium work demand level. ------------progressing 5/5/22  3. Demonstrate increased functional strength by lifting 20 lbs floor to waist and 20 lbs waist to shoulder in order to meet demand of work/home routine. ------------MET 5/5/22  4. Increase her positional tolerance to the level needed for work and home activities. ------------progressing 5/5/22  5. Implement self-care strategies during the program and at home to control pain. ------------progressing 5/5/22  6.   Patient will demonstrate use of mindfulness, stress management, and coping  strategies for improved participation in daily routine. ------------progressing 5/5/22     UPDATED GOALS 5/5/22:  1.Demonstrate increased functional strength by lifting 30 lbs floor to waist and 25 lbs waist to shoulder in order to meet demand of work/home routine      Specific patient expressed goals and demand levels:  Current Capacity Limit/ Physical  Demand Level (PDL)    Demand Levels of Functional Recovery Goals     Performance/Satisfaction  Day 1 Performance/Satisfaction  Day 10 Performance/Satisfaction  Follow-up      Light Physical Demand  (Based above tested results)     Vocational:  Attend /participate in [employer]     Recreational:  Walking      Increased socialization     Daily Living:  Cooking      Cleaning         Medium Physical Demand Level      1 / 1 1 / 0        1 / 0             3 / 1 1 / 1      5 / 2            5 / 5      4 / 3            3 / 3      5 / 5     The PDL listed above is based on the physical performance screening test completed at evaluation. More comprehensive physical testing integrated with clinical findings would be required to derived an accurate work capacity.       PLAN   UPDATED plan of care certification 5/5/2022 to 7/15/2022     Outpatient occupational therapy, 3 x/wks for 2 additional weeks. Following this, pt to go on hold for a minimum of 7 weeks to attempt independence w/ Home Activity Plan (HAP) & PT activities, and will then return for reassessment.    Mira Ryder, OTR/L  5/11/2022

## 2022-05-11 NOTE — PROGRESS NOTES
[facility]    Functional Restoration Program  Physical Therapy Treatment Note  FRP Day 11    Name: Yanni Kaiser  MRN:[id number]      Therapy Diagnosis:   Encounter Diagnoses   Name Primary?    Impaired functional mobility, balance, gait, and endurance Yes    Impaired functional mobility and activity tolerance     Decreased activities of daily living (ADL)     Chronic left-sided low back pain with sciatica, sciatica laterality unspecified     Recurrent major depressive disorder, in full remission     Chronic pain disorder      Physician: Snow Collazo NP    Date: 5/11/2022  Physician Orders: PT Eval and Treat   Medical Diagnosis from Referral:   G89.4 (ICD-10-CM) - Chronic pain disorder   Z78.9 (ICD-10-CM) - Decreased activities of daily living (ADL)   Z74.09 (ICD-10-CM) - Impaired functional mobility and activity tolerance         Evaluation Date: 3/15/2022  Authorization Period Expiration: 12/31/2022  Plan of Care Expiration: 6/10/2022  Visit # / Visits authorized: 9 / 20  FOTO: 2/ 3      Precautions: Standard, Diabetes and Fall     Time In:  2:45 pm  Time Out: 4:00 pm  Total Appointment Time (timed & untimed codes): 75 minutes        SUBJECTIVE     Yanni reports she missed Monday due to some respiratory illness & although she is still having some trouble w/ her sinuses, she is ok for treatment today. After participating in exercises today, she reports decreased pain but some increased fatigue.       Patient's FRP goals: improve her function so she can enjoy life again      Current 5/10  Location(s): B low back, L knee       OBJECTIVE     Objective Measures updated at progress report unless specified.       Limitation/Restriction for FOTO lumbar Survey     Therapist reviewed FOTO scores for Yanni Kaiser on 3/15/2022.   FOTO documents entered into [employer] - see Media section.     Limitation Score: 49%  04/20/22: 71%       Predicted Score: 44%              Treatment       Yanni received the Individualized Treatments listed below:     therapeutic exercises to develop strength, endurance, ROM, flexibility, posture and core stabilization for 75 minutes including:     Peripheral muscle strengthening which included 1-2 sets of 10-20 repetitions at a slow, controlled 5-7 second per rep pace focused on strengthening supporting musculature for improved body mechanics & functional mobility.  Patient & therapist focused on proper form during treatment to ensure optimal strengthening of each targeted muscle group. Patients are instructed to keep sets/reps with proper load to stay at 3-5 out of 10 on the provided modified Froylan exertion scale.       BATCA Machine Exercises Weight (lbs) Sets Repetitions Froylan Exertion Scale Rating   Leg Extension  5 1 15 4   Hamstring Curls 20 1 20 4   Chest Press       Pec Fly       Lat Pull Down 27.5 1 20 4   Mid Row 27.5 1 20 4   Bicep Bar Curls 7.5 1 20 4   Standing Tricep Extension 17.5 1 20 5   Single Leg Press  R/L  30 1 20 5     · Fitter board hard tilts 20 each way w/ palmar support  · Supine diaphragmatic breaths 3lb cuff weight 2x20  · Supine LTR 10 B  · Supine LTR w/ Tball 10 B  · Bridge partial w/ bridge 15 B    Patient Education and Home Exercises     Home Exercises Provided and Patient Education Provided     Education provided:     Written Home Exercises Provided: Patient instructed to cont prior HEP. Exercises were reviewed and Yanni was able to demonstrate them prior to the end of the session.  Yanni demonstrated fair  understanding of the education provided. See EMR under Patient Instructions for information provided during therapy sessions    ASSESSMENT     Pt w/ good participation in PT today despite feeling under the weather. She required increased rest breaks today, but still progressed on some of her resisted training. She verbalizes good compliance w/ activities at home & continued intrinsic motivation.       Yanni Is progressing well towards her goals.   Pt prognosis is Fair.     Pt will continue to benefit from skilled outpatient physical therapy to address the deficits listed in the problem list box on initial evaluation, provide pt/family education and to maximize pt's level of independence in the home and community environment.     Pt's spiritual, cultural and educational needs considered and pt agreeable to plan of care and goals.     Anticipated barriers to physical therapy: chronic nature of issues, high BMI    GOALS: Pt is in agreement with the following goals:        Goal Progress Towards Goal   Short Term: In 3 weeks, pt will:     Verbalize & demonstrate good understanding of resisted training with 3-1-3 second rep for gptevqysnh-uviufmpxu-jiplkajwo contraction to encourage full muscle recruitment for strength & endurance Progress towards goal: initiated 3/15/2022   Verbalize & demonstrate good understanding of home stretch routine to improve AROM, help decrease pain & improve mobility Progress towards goal: initiated 3/15/2022   Demonstrate proper supine or seated diaphragmatic breathing with decreased chest excursion & emphasis on full abdominal excursion & increased expiration time to promote muscle relaxation & increased parasympathetic activity to improve patient tolerance to activities Goal met 5/2/2022   Demonstrate proper static standing posture in frontal & sagittal plane per PT visual assessment to decrease postural strain in ADLs Progress towards goal: initiated 3/15/2022   Demonstrate good recall of names of resisted exercises w/ minimal to moderate verbal cues to promote independence w/ exercise at the end of the program Progress towards goal: initiated 3/15/2022   Verbalize plan for continuing resisted exercises w/ specific location (i.e. commercial gym, home, community fitness center) indicating preference for machine-based or free-weight exercises to incorporate all major muscle groups  to maintain & progress therapeutic functional improvements Progress towards goal: initiated 3/15/2022         Long Term: In 8 weeks, pt will:     Verbalize good understanding of activities planning/scheduling (stretching, mindfulness, resisted training, & cardio) prescribed by PT/OT following the end of the program to sustain & progress functional improvements Progress towards goal: initiated 3/15/2022   Be independent w/ selected resisted training w/ minimal to no cuing while performing to continue w/ resisted strengthening independently at the end of the program Progress towards goal: initiated 3/15/2022   Improve 2 Minute Walk Test (MWT) to 400 feet and 6 MWT to 1200 feet or greater to improve gait speed and mobility Progress towards goal: initiated 3/15/2022   Perform single leg stance 10 seconds or greater bilaterally to improve safety and balance in ADLs Progress towards goal: initiated 3/15/2022   Demonstrate Timed Up & Go (with appropriate assistive device, if necessary) of 10 seconds or less to decrease fall risk and improve functional mobility Progress towards goal: initiated 3/15/2022   Demonstrate 5 time sit to stand test without upper extremity assist to 15 sec or less to improve general mobility and decrease fall risk Progress towards goal: initiated 3/15/2022   Score 44 % or less on Lumbar FOTO to indicate significant functional improvements in impaired functions secondary to low back pain Progress towards goal: initiated 3/15/2022          PLAN     Continue PT per POC     Ugo Tim, PT, DPT

## 2022-05-12 ENCOUNTER — CLINICAL SUPPORT (OUTPATIENT)
Dept: REHABILITATION | Facility: OTHER | Age: 52
End: 2022-05-12
Payer: MEDICARE

## 2022-05-12 DIAGNOSIS — Z74.09 IMPAIRED FUNCTIONAL MOBILITY, BALANCE, GAIT, AND ENDURANCE: Primary | ICD-10-CM

## 2022-05-12 DIAGNOSIS — Z78.9 DECREASED ACTIVITIES OF DAILY LIVING (ADL): ICD-10-CM

## 2022-05-12 DIAGNOSIS — G89.4 CHRONIC PAIN DISORDER: ICD-10-CM

## 2022-05-12 DIAGNOSIS — M54.40 CHRONIC LEFT-SIDED LOW BACK PAIN WITH SCIATICA, SCIATICA LATERALITY UNSPECIFIED: ICD-10-CM

## 2022-05-12 DIAGNOSIS — F33.42 RECURRENT MAJOR DEPRESSIVE DISORDER, IN FULL REMISSION: ICD-10-CM

## 2022-05-12 DIAGNOSIS — Z78.9 ALTERATION IN PERFORMANCE OF ACTIVITIES OF DAILY LIVING: Primary | ICD-10-CM

## 2022-05-12 DIAGNOSIS — F40.298 FEAR OF PAIN: ICD-10-CM

## 2022-05-12 DIAGNOSIS — G89.29 CHRONIC LEFT-SIDED LOW BACK PAIN WITH SCIATICA, SCIATICA LATERALITY UNSPECIFIED: ICD-10-CM

## 2022-05-12 DIAGNOSIS — Z74.09 IMPAIRED FUNCTIONAL MOBILITY AND ACTIVITY TOLERANCE: ICD-10-CM

## 2022-05-12 PROCEDURE — 97530 THERAPEUTIC ACTIVITIES: CPT

## 2022-05-12 PROCEDURE — 97110 THERAPEUTIC EXERCISES: CPT

## 2022-05-12 NOTE — PROGRESS NOTES
"[facility]   Functional Restoration Program  Occupational Therapy Daily Note  FRP Day 12    Name: Yanni Kaiser  Medical Record Number: [id number]  Visit date: 5/12/2022    Therapy Diagnosis:   Encounter Diagnoses   Name Primary?    Alteration in performance of activities of daily living Yes    Fear of pain      Physician: Snow Collazo NP  Physician Orders: OT Eval and Treat  Medical Diagnosis: Chronic pain disorder  Evaluation Date: 3/15/2022  Insurance Authorization Period Expiration: 12/31/2022  Plan of Care Certification Period: 5/27/2022  Visit # / Visits authorized: 9 / 20  FOTO: evaluation: 60% limitation    Time In: 2:45 PM  Time Out: 4:00 PM  Total Billable Time: 75 minutes    Subjective     Yanni reports "I still don't feel great but I made it".        Pain: 6 / 10  Location: middle low back     Personal Functional Three Month Goals: Performance and satisfaction noted below.    OBJECTIVE      Individualized Treatment: Increased resistance levels of functional conditioning exercises according to personal recovery goals. Activities include: Dynamic reaching, xjyen-cj-cxhnv lifting, sustained standing activity, maximal lifting, 2-handed carry, sustained seated tasks, push and pull, waist-to-shoulder lift, box/container carry, endurance training. Daily functional conditioning progress is documented on a flow sheet which is available upon request.      Yanni participated in dynamic, functional therapeutic activities/ lifting/endurance activities in order to increase pt's participation with vocational, selfcare ADLs, and leisure activities in order to improve quality of life for 75 minutes, including:     Yanni participated in endurance activity using treadmill for 10 minutes, starting at level 1.0mph  to 2.0 to improve functional endurance and progress towards increased MET level for improved community mobility and participation, rated as sort of hard (4/10) " "exertion, Yanni re-educated on how to add mindfulness component to activity and how to pace appropriately by completed self check ins and grading activity as needed, with goal to reach moderate to sort of hard by end of activity. Completed 0.33 miles.     Pt re-educated on proper mechanics to get on and off floor using chair for support. Pt educated on steps to take to ensure safety if she were to have a fall: roll to back, complete body scan to see if all body parts are okay, roll to side, hands/knees, crawl to chair and then use steps to get on/off floor. Pt demonstrated understanding x2reps. Pt with increased self-efficacy after completion of activity. Pt rated as sort of hard (4/10) exertion.     Pt completed yoga activity on mat, supine, quadriped, seated and standing, with focused education on getting on/off floor properly and safely, back mobility, hip flexibility, stress reduction, dynamic standing balance, posture, alignment and coordinating movement with breath, rated hard (5/10). Added focus on hamstring flexibility and independent traction in back with chapis pose. "its hard but its nice at the same time". Once back in seated position in chair she noted "that does feel good on my back". While seated discussed plan post program to join [facility] gym.     Yanni completed functional lifting, floor to waist, 3x5 reps x 10 lbs with exertion rated hard (5/10); focused education on pacing during task performance. Took standing rest break midway to focus on deep breathing. Pt plans to use this lift related to picking up items at the house. With SOB post activity.     Yanni participated in functional lift waist to shoulder, 3x5 reps x 11 lbs with a rating of Sort of hard (4/10) exertion. Yanni re-educated on standing posture, core awareness, keeping shoulders depressed and retracted. Increased fatigue throughout activity but with good motivation and use of standing stretches as needed.    Yanni " completed maximal lift 2x5 reps with 14 lbs, rated as hard (5/10) exertion, continued education focused on neutral spine positioning and pushing through heels for safety and activation of correct muscles. Continues to discuss increased pain in B feet but notes that pain has decreased. Re-educated on toes and knees tracking together to minimize torque and decrease pressure in B knees.     Pt completed seated mindfulness diaphragmatic breathing activity [id number] with focus on posture, mechanics of breathing, and benefits re: PNS activation. Pt voiced and demonstrated understanding.     Yanni completed dynamic reaching in various functional ranges, with continued focus on core awareness, standing posture, hip rotation/weight shifting, slow and controlled moving, pacing as needed. Completed 3x5 reps x 2 lbs, rated as moderate (3/10) exertion.       Patient Education and Home Exercises     Education provided:   - keke scale, pain cycle, z-lie, functional lifting mechanics, pursed lip and diaphragmatic breathing techniques  - 4-7-8 breathing technique.    Written Home Exercises Provided: Patient instructed to cont prior HEP. Exercises were reviewed and Yanni was able to demonstrate them prior to the end of the session.  Yanni demonstrated good  understanding of the education provided. See EMR under Patient Instructions for information provided during therapy sessions.    ASSESSMENT     Yanni presents with continued congestion but with slight increased energy levels upon arrival and with improved mood/affect/energy levels throughout session. Good tolerance to session this date with good use of intentional pacing 2/2 increased fatigue. Good use of breathing and stretching techniques throughout session. Good response to yoga activity and with improved tolerance to supine postures. Noted R knee pain throughout session. Overall, continues to progress towards personal goals.     Yanni Is progressing well towards  her goals. Pt prognosis is Excellent.     Pt will continue to benefit from skilled outpatient occupational therapy to address the deficits listed in the problem list box on initial evaluation, provide pt/family education and to maximize pt's level of independence in the home and community environment.      Pt's spiritual, cultural and educational needs considered and pt agreeable to plan of care and goals.     FRP Goals: While working towards the long-term (3 month) functional goals listed above, Bobby accomplish the following short term goals during the 5-week intensive rehabilitation program. Yanni will:     1. Develop a viable return to community participation plan. ------------progressing 5/5/22  2. Demonstrate physical capacities consistent with a medium work demand level. ------------progressing 5/5/22  3. Demonstrate increased functional strength by lifting 20 lbs floor to waist and 20 lbs waist to shoulder in order to meet demand of work/home routine. ------------MET 5/5/22  4. Increase her positional tolerance to the level needed for work and home activities. ------------progressing 5/5/22  5. Implement self-care strategies during the program and at home to control pain. ------------progressing 5/5/22  6.   Patient will demonstrate use of mindfulness, stress management, and coping  strategies for improved participation in daily routine. ------------progressing 5/5/22     UPDATED GOALS 5/5/22:  1.Demonstrate increased functional strength by lifting 30 lbs floor to waist and 25 lbs waist to shoulder in order to meet demand of work/home routine      Specific patient expressed goals and demand levels:  Current Capacity Limit/ Physical  Demand Level (PDL)    Demand Levels of Functional Recovery Goals     Performance/Satisfaction  Day 1 Performance/Satisfaction  Day 10 Performance/Satisfaction  Follow-up      Light Physical Demand  (Based above tested results)     Vocational:  Attend /participate in  [employer]     Recreational:  Walking      Increased socialization     Daily Living:  Cooking      Cleaning         Medium Physical Demand Level      1 / 1            1 / 0       1 / 0             3 / 1       1 / 1      5 / 2            5 / 5      4 / 3            3 / 3      5 / 5     The PDL listed above is based on the physical performance screening test completed at evaluation. More comprehensive physical testing integrated with clinical findings would be required to derived an accurate work capacity.       PLAN   UPDATED plan of care certification 5/5/2022 to 7/15/2022     Outpatient occupational therapy, 3 x/wks for 2 additional weeks. Following this, pt to go on hold for a minimum of 7 weeks to attempt independence w/ Home Activity Plan (HAP) & PT activities, and will then return for reassessment.    Mira Ryder, OTR/L  5/12/2022

## 2022-05-12 NOTE — PROGRESS NOTES
[facility]    Functional Restoration Program  Physical Therapy Treatment Note  FRP Day 11    Name: Yanni Kaiser  MRN:[id number]      Therapy Diagnosis:   Encounter Diagnoses   Name Primary?    Impaired functional mobility, balance, gait, and endurance Yes    Impaired functional mobility and activity tolerance     Decreased activities of daily living (ADL)     Chronic left-sided low back pain with sciatica, sciatica laterality unspecified     Recurrent major depressive disorder, in full remission     Chronic pain disorder      Physician: Snow Collazo NP    Date: 5/12/2022  Physician Orders: PT Eval and Treat   Medical Diagnosis from Referral:   G89.4 (ICD-10-CM) - Chronic pain disorder   Z78.9 (ICD-10-CM) - Decreased activities of daily living (ADL)   Z74.09 (ICD-10-CM) - Impaired functional mobility and activity tolerance         Evaluation Date: 3/15/2022  Authorization Period Expiration: 12/31/2022  Plan of Care Expiration: 6/10/2022  Visit # / Visits authorized: 10 / 20  FOTO: 2/ 3      Precautions: Standard, Diabetes and Fall     Time In:  1:30 pm  Time Out: 2:45 pm  Total Appointment Time (timed & untimed codes): 75 minutes        SUBJECTIVE     Yanni reports she is only feeling a little bit better today, but is still ok for therapy.    Patient's FRP goals: improve her function so she can enjoy life again      Current 5/10  Location(s): B low back, L knee       OBJECTIVE     Objective Measures updated at progress report unless specified.       Limitation/Restriction for FOTO lumbar Survey     Therapist reviewed FOTO scores for Yanni Kaiser on 3/15/2022.   FOTO documents entered into [employer] - see Media section.     Limitation Score: 49%  04/20/22: 71%       Predicted Score: 44%             Treatment       Yanni received the Individualized Treatments listed below:     therapeutic exercises to develop strength, endurance, ROM, flexibility, posture  "and core stabilization for 75 minutes including:     Full body stretch w/ 3-10 sec hold technique &/or 3-5 repetition technique on all of the following:   Cervical flx/ext   Cervical sidebending   Cervical rotation   Cervical protraction/retraction   Shld rolls   Shld depression/elevation   Scapular retraction/protraction/scaption   Posterior shld stretch (cross arm)   Shld ER stretch (chicken wing)   Elbow flx/ext   Forearm supination/pronation   Wrist flx/ext   Open/close hands/fingers   Seated trunk rotations   Seated trunk/thoracic ext/flx   Seated marches & knee to chest   Seated knee flx/ext   Seated hip ER/piriformis stretch   Seated hamstring stretch   Seated toe raise to heel raise    Seated ankle circles each way   Standing lumbar extensions   Standing lateral leaning stretch   Standing quad stretch (UE support for balance)      Peripheral muscle strengthening which included 1-2 sets of 10-20 repetitions at a slow, controlled 5-7 second per rep pace focused on strengthening supporting musculature for improved body mechanics & functional mobility.  Patient & therapist focused on proper form during treatment to ensure optimal strengthening of each targeted muscle group. Patients are instructed to keep sets/reps with proper load to stay at 3-5 out of 10 on the provided modified Froylan exertion scale.       BATCA Machine Exercises Weight (lbs) Sets Repetitions Froylan Exertion Scale Rating   Leg Extension        Hamstring Curls       Chest Press 25 1 20 4   Pec Fly 25 1 20 4   Lat Pull Down 27.5 1 20 4   Mid Row 27.5 1 20 5   Bicep Bar Curls       Standing Tricep Extension       Single Leg Press  R/L  30 1 20 4R, 5L     · Fitter board hard tilts 20 each way w/ palmar support  · Supine diaphragmatic breaths 3lb cuff weight 2x20  · Supine TKE 2 lb B LE 2x20 w/ 3" hold  · Supine LTR 10 B  · Bridge partial w/ bridge 10 B    Patient Education and Home Exercises     Home Exercises Provided and " Patient Education Provided     Education provided: none    Written Home Exercises Provided: Patient instructed to cont prior HEP. Exercises were reviewed and Yanni was able to demonstrate them prior to the end of the session.  Yanni demonstrated fair  understanding of the education provided. See EMR under Patient Instructions for information provided during therapy sessions    ASSESSMENT     Pt w/ good participation in PT again today despite feeling under the weather. Her report of increased L leg pain responded well to TKE exercise w/ pt reporting significant reduction in her pain. Supine exercises w/out much impact on her low back pain.     Yanni Is progressing well towards her goals.   Pt prognosis is Fair.     Pt will continue to benefit from skilled outpatient physical therapy to address the deficits listed in the problem list box on initial evaluation, provide pt/family education and to maximize pt's level of independence in the home and community environment.     Pt's spiritual, cultural and educational needs considered and pt agreeable to plan of care and goals.     Anticipated barriers to physical therapy: chronic nature of issues, high BMI    GOALS: Pt is in agreement with the following goals:        Goal Progress Towards Goal   Short Term: In 3 weeks, pt will:     Verbalize & demonstrate good understanding of resisted training with 3-1-3 second rep for dpxbifbwlp-gyqapbsup-kvpzsdlrl contraction to encourage full muscle recruitment for strength & endurance Progress towards goal: initiated 3/15/2022   Verbalize & demonstrate good understanding of home stretch routine to improve AROM, help decrease pain & improve mobility Progress towards goal: initiated 3/15/2022   Demonstrate proper supine or seated diaphragmatic breathing with decreased chest excursion & emphasis on full abdominal excursion & increased expiration time to promote muscle relaxation & increased parasympathetic activity to improve  patient tolerance to activities Goal met 5/2/2022   Demonstrate proper static standing posture in frontal & sagittal plane per PT visual assessment to decrease postural strain in ADLs Progress towards goal: initiated 3/15/2022   Demonstrate good recall of names of resisted exercises w/ minimal to moderate verbal cues to promote independence w/ exercise at the end of the program Progress towards goal: initiated 3/15/2022   Verbalize plan for continuing resisted exercises w/ specific location (i.e. commercial gym, home, community fitness center) indicating preference for machine-based or free-weight exercises to incorporate all major muscle groups to maintain & progress therapeutic functional improvements Progress towards goal: initiated 3/15/2022         Long Term: In 8 weeks, pt will:     Verbalize good understanding of activities planning/scheduling (stretching, mindfulness, resisted training, & cardio) prescribed by PT/OT following the end of the program to sustain & progress functional improvements Progress towards goal: initiated 3/15/2022   Be independent w/ selected resisted training w/ minimal to no cuing while performing to continue w/ resisted strengthening independently at the end of the program Progress towards goal: initiated 3/15/2022   Improve 2 Minute Walk Test (MWT) to 400 feet and 6 MWT to 1200 feet or greater to improve gait speed and mobility Progress towards goal: initiated 3/15/2022   Perform single leg stance 10 seconds or greater bilaterally to improve safety and balance in ADLs Progress towards goal: initiated 3/15/2022   Demonstrate Timed Up & Go (with appropriate assistive device, if necessary) of 10 seconds or less to decrease fall risk and improve functional mobility Progress towards goal: initiated 3/15/2022   Demonstrate 5 time sit to stand test without upper extremity assist to 15 sec or less to improve general mobility and decrease fall risk Progress towards goal: initiated 3/15/2022    Score 44 % or less on Lumbar FOTO to indicate significant functional improvements in impaired functions secondary to low back pain Progress towards goal: initiated 3/15/2022          PLAN     Continue PT per POC     Ugo Tim, PT, DPT

## 2022-05-13 ENCOUNTER — PES CALL (OUTPATIENT)
Dept: ADMINISTRATIVE | Facility: CLINIC | Age: 52
End: 2022-05-13
Payer: MEDICARE

## 2022-05-16 ENCOUNTER — TELEPHONE (OUTPATIENT)
Dept: ALLERGY | Facility: CLINIC | Age: 52
End: 2022-05-16
Payer: MEDICARE

## 2022-05-16 NOTE — TELEPHONE ENCOUNTER
SUBJECTIVE:   Mary Sims is a 57 year old female who presents to clinic today for the following health issues:    Musculoskeletal problem/pain      Duration: 1:30am this morning     Description  Location: left foot     Intensity:  severe, 15-10/10    Accompanying signs and symptoms: radiation of pain to foot, numbness, tingling, swelling and discoloration of bluish.    History  Previous similar problem: no   Previous evaluation:  none    Precipitating or alleviating factors:  Trauma or overuse: YES kick stool.  Aggravating factors include: standing and walking    Therapies tried and outcome: nothing    A pleasant 57 year old female presents to clinic today following injury of left foot. She was dog sitting and ran into kitchen stool when letting the dog out. 4th toe is tender to touch and it's painful with any weight bearing activity. She noticed some discomfort immediately following the incident, but was able to get back to bed. However, this morning upon wakening pain was much worse and it was difficult to bear any weight. Left foot is now swollen and bruised. She has not tried any meds or ice. States she got up and drove the clinic right away this morning because pain was so bad. 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      Problem list and histories reviewed & adjusted, as indicated.  Additional history: as documented    Allergies   Allergen Reactions     No Known Drug Allergy        Reviewed and updated as needed this visit by clinical staff  Tobacco  Allergies  Meds  Problems       Reviewed and updated as needed this visit by Provider  Allergies  Meds  Problems         ROS:  Constitutional, HEENT, cardiovascular, pulmonary, gi and gu systems are negative, except as otherwise noted.    OBJECTIVE:     /88 (BP Location: Left arm, Patient Position: Chair, Cuff Size: Adult Large)  Pulse 67  Temp 97.9  F (36.6  C) (Oral)  Resp 14  Wt 195 lb 14.4 oz (88.9 kg)  LMP 08/04/2015  Patient has an appointment scheduled to see Dr. Huerta on 6/7/2022. Please send Odactra to:   [facility] [address]  [address] [place] [address]  Attn: Allergy Nurse  9th floor    Thank you,  Mariam      (Approximate)  SpO2 98%  BMI 33.63 kg/m2  Body mass index is 33.63 kg/(m^2).   GENERAL: healthy, alert and no distress  MS: 1-2+ edema to left 3rd-5th toe phalange and tenderness to palpation of 4th left toe phalange. Bruising on dorsum of left foot. PD and DP 2+ normal. She is able to walk but walks with a limp. CMS intact. Cap refill <2 sec  NEURO: Normal strength and tone, mentation intact and speech normal  PSYCH: mentation appears normal, affect normal/bright    Diagnostic Test Results:  Xray - Left foot: left 4th proximal phalanx fracture.     ASSESSMENT/PLAN:     Problem List Items Addressed This Visit     None      Visit Diagnoses     Closed fracture of phalanx of left fourth toe, initial encounter    -  Primary    Relevant Medications    order for DME    HYDROcodone-acetaminophen (NORCO) 5-325 MG per tablet    Other Relevant Orders    XR Foot Left G/E 3 Views    ORTHO  REFERRAL (Completed)         Fracture of left 4th toe. Ortho is here in clinic today so I did ask them to take a look at the x-ray also.   Ice 15 minutes 4-6 times daily  Elevate while resting  Keep left foot in front of you while you walk  Don't step off with your toes  Use boot to ambulate  Continue naproxen for pain every 12 hours. Take with food  For pain not relieved by the naproxen, you may take acetaminophen/hydrocodone (Norco). No driving, operating machinery, or drinking alcohol while taking  Follow up with orthopedics next week  See work note for work restrictions      See Patient Instructions    The benefits, risks and potential side effects were discussed in detail. Black box warnings discussed as relevant. All patient questions were answered. The patient was instructed to follow up immediately if any adverse reactions develop.    Patient verbalizes understanding and agrees with plan of care. Patient stable for discharge.      PABLO Michel [facility]

## 2022-05-16 NOTE — TELEPHONE ENCOUNTER
----- Message from Elizabeth A Bosworth, LPN sent at 5/13/2022 11:45 AM CDT -----  Regarding: FW: Odactra Missed Doses    ----- Message -----  From: Candida Huerta MD  Sent: 4/19/2022   8:30 AM CDT  To: Elizabeth A Bosworth, LPN, #  Subject: RE: Odactra Missed Doses                         Mykel Saldana,    Thank you for reaching out. We will need to bring Ms. Kaiser back for another observed dose in clinic since it has been over one week. I'll cc our nurse MsJonel Елена on this message to get her scheduled in clinic. Thank you!    Dean Huerta  ----- Message -----  From: Les Bone PharmD  Sent: 4/18/2022  11:40 AM CDT  To: Candida Huerta MD, #  Subject: Odactra Missed Doses                             Good morning,    The patient started Odactra in office on 3/8. [facility] has not yet dispensed a refill. Patient admits to missing doses, but she is not sure how many or how many were missed consecutively. Based on her start date of 3/8, she was due to run out of medication on 4/7 (12 days ago). Just wanted to confirm if you would like the patient to restart therapy in office with observation or if she can proceed at home. Please advise.    Thank you,    Les Bone, Cielo  Clinical Pharmacist   [facility]   P: [phone]

## 2022-05-17 ENCOUNTER — OFFICE VISIT (OUTPATIENT)
Dept: FAMILY MEDICINE | Facility: CLINIC | Age: 52
End: 2022-05-17
Payer: MEDICARE

## 2022-05-17 ENCOUNTER — PATIENT OUTREACH (OUTPATIENT)
Dept: ADMINISTRATIVE | Facility: HOSPITAL | Age: 52
End: 2022-05-17
Payer: MEDICARE

## 2022-05-17 ENCOUNTER — TELEPHONE (OUTPATIENT)
Dept: FAMILY MEDICINE | Facility: CLINIC | Age: 52
End: 2022-05-17

## 2022-05-17 VITALS
SYSTOLIC BLOOD PRESSURE: 130 MMHG | OXYGEN SATURATION: 97 % | WEIGHT: 293 LBS | TEMPERATURE: 99 F | HEIGHT: 64 IN | BODY MASS INDEX: 50.02 KG/M2 | DIASTOLIC BLOOD PRESSURE: 64 MMHG | HEART RATE: 72 BPM

## 2022-05-17 DIAGNOSIS — N18.31 TYPE 2 DIABETES MELLITUS WITH STAGE 3A CHRONIC KIDNEY DISEASE, WITH LONG-TERM CURRENT USE OF INSULIN: ICD-10-CM

## 2022-05-17 DIAGNOSIS — F45.8 PRURITUS SINE MATERIA: ICD-10-CM

## 2022-05-17 DIAGNOSIS — J44.1 ASTHMA EXACERBATION IN COPD: Primary | ICD-10-CM

## 2022-05-17 DIAGNOSIS — N18.31 STAGE 3A CHRONIC KIDNEY DISEASE: Chronic | ICD-10-CM

## 2022-05-17 DIAGNOSIS — Z74.09 IMPAIRED FUNCTIONAL MOBILITY, BALANCE, GAIT, AND ENDURANCE: ICD-10-CM

## 2022-05-17 DIAGNOSIS — Z79.4 TYPE 2 DIABETES MELLITUS WITH STAGE 3A CHRONIC KIDNEY DISEASE, WITH LONG-TERM CURRENT USE OF INSULIN: ICD-10-CM

## 2022-05-17 DIAGNOSIS — F33.42 RECURRENT MAJOR DEPRESSIVE DISORDER, IN FULL REMISSION: ICD-10-CM

## 2022-05-17 DIAGNOSIS — J45.901 ASTHMA EXACERBATION IN COPD: Primary | ICD-10-CM

## 2022-05-17 DIAGNOSIS — E11.22 TYPE 2 DIABETES MELLITUS WITH STAGE 3A CHRONIC KIDNEY DISEASE, WITH LONG-TERM CURRENT USE OF INSULIN: ICD-10-CM

## 2022-05-17 DIAGNOSIS — I10 ESSENTIAL HYPERTENSION: Chronic | ICD-10-CM

## 2022-05-17 PROCEDURE — 96372 PR INJECTION,THERAP/PROPH/DIAG2ST, IM OR SUBCUT: ICD-10-PCS | Mod: 59,S$GLB,, | Performed by: NURSE PRACTITIONER

## 2022-05-17 PROCEDURE — 3052F HG A1C>EQUAL 8.0%<EQUAL 9.0%: CPT | Mod: CPTII,S$GLB,, | Performed by: NURSE PRACTITIONER

## 2022-05-17 PROCEDURE — 99215 PR OFFICE/OUTPT VISIT, EST, LEVL V, 40-54 MIN: ICD-10-PCS | Mod: 25,S$GLB,, | Performed by: NURSE PRACTITIONER

## 2022-05-17 PROCEDURE — 3052F PR MOST RECENT HEMOGLOBIN A1C LEVEL 8.0 - < 9.0%: ICD-10-PCS | Mod: CPTII,S$GLB,, | Performed by: NURSE PRACTITIONER

## 2022-05-17 PROCEDURE — 3008F PR BODY MASS INDEX (BMI) DOCUMENTED: ICD-10-PCS | Mod: CPTII,S$GLB,, | Performed by: NURSE PRACTITIONER

## 2022-05-17 PROCEDURE — 3078F PR MOST RECENT DIASTOLIC BLOOD PRESSURE < 80 MM HG: ICD-10-PCS | Mod: CPTII,S$GLB,, | Performed by: NURSE PRACTITIONER

## 2022-05-17 PROCEDURE — 94640 PR INHAL RX, AIRWAY OBST/DX SPUTUM INDUCT: ICD-10-PCS | Mod: S$GLB,,, | Performed by: NURSE PRACTITIONER

## 2022-05-17 PROCEDURE — 99499 UNLISTED E&M SERVICE: CPT | Mod: S$GLB,,, | Performed by: NURSE PRACTITIONER

## 2022-05-17 PROCEDURE — 1159F PR MEDICATION LIST DOCUMENTED IN MEDICAL RECORD: ICD-10-PCS | Mod: CPTII,S$GLB,, | Performed by: NURSE PRACTITIONER

## 2022-05-17 PROCEDURE — 3008F BODY MASS INDEX DOCD: CPT | Mod: CPTII,S$GLB,, | Performed by: NURSE PRACTITIONER

## 2022-05-17 PROCEDURE — 1159F MED LIST DOCD IN RCRD: CPT | Mod: CPTII,S$GLB,, | Performed by: NURSE PRACTITIONER

## 2022-05-17 PROCEDURE — 96372 THER/PROPH/DIAG INJ SC/IM: CPT | Mod: 59,S$GLB,, | Performed by: NURSE PRACTITIONER

## 2022-05-17 PROCEDURE — 99999 PR PBB SHADOW E&M-EST. PATIENT-LVL V: CPT | Mod: PBBFAC,,, | Performed by: NURSE PRACTITIONER

## 2022-05-17 PROCEDURE — 4010F ACE/ARB THERAPY RXD/TAKEN: CPT | Mod: CPTII,S$GLB,, | Performed by: NURSE PRACTITIONER

## 2022-05-17 PROCEDURE — 3078F DIAST BP <80 MM HG: CPT | Mod: CPTII,S$GLB,, | Performed by: NURSE PRACTITIONER

## 2022-05-17 PROCEDURE — 99215 OFFICE O/P EST HI 40 MIN: CPT | Mod: 25,S$GLB,, | Performed by: NURSE PRACTITIONER

## 2022-05-17 PROCEDURE — 3075F SYST BP GE 130 - 139MM HG: CPT | Mod: CPTII,S$GLB,, | Performed by: NURSE PRACTITIONER

## 2022-05-17 PROCEDURE — 99499 RISK ADDL DX/OHS AUDIT: ICD-10-PCS | Mod: S$GLB,,, | Performed by: NURSE PRACTITIONER

## 2022-05-17 PROCEDURE — 3075F PR MOST RECENT SYSTOLIC BLOOD PRESS GE 130-139MM HG: ICD-10-PCS | Mod: CPTII,S$GLB,, | Performed by: NURSE PRACTITIONER

## 2022-05-17 PROCEDURE — 94640 AIRWAY INHALATION TREATMENT: CPT | Mod: S$GLB,,, | Performed by: NURSE PRACTITIONER

## 2022-05-17 PROCEDURE — 99999 PR PBB SHADOW E&M-EST. PATIENT-LVL V: ICD-10-PCS | Mod: PBBFAC,,, | Performed by: NURSE PRACTITIONER

## 2022-05-17 PROCEDURE — 4010F PR ACE/ARB THEARPY RXD/TAKEN: ICD-10-PCS | Mod: CPTII,S$GLB,, | Performed by: NURSE PRACTITIONER

## 2022-05-17 RX ORDER — ALBUTEROL SULFATE 0.83 MG/ML
2.5 SOLUTION RESPIRATORY (INHALATION)
Status: DISCONTINUED | OUTPATIENT
Start: 2022-05-17 | End: 2022-05-17

## 2022-05-17 RX ORDER — IPRATROPIUM BROMIDE AND ALBUTEROL SULFATE 2.5; .5 MG/3ML; MG/3ML
3 SOLUTION RESPIRATORY (INHALATION)
Status: COMPLETED | OUTPATIENT
Start: 2022-05-17 | End: 2022-05-17

## 2022-05-17 RX ORDER — PREDNISONE 10 MG/1
TABLET ORAL
COMMUNITY
Start: 2022-05-17 | End: 2022-05-17

## 2022-05-17 RX ORDER — DEXAMETHASONE SODIUM PHOSPHATE 4 MG/ML
8 INJECTION, SOLUTION INTRA-ARTICULAR; INTRALESIONAL; INTRAMUSCULAR; INTRAVENOUS; SOFT TISSUE
Status: COMPLETED | OUTPATIENT
Start: 2022-05-17 | End: 2022-05-17

## 2022-05-17 RX ORDER — LEVOCETIRIZINE DIHYDROCHLORIDE 5 MG/1
5 TABLET, FILM COATED ORAL NIGHTLY
Qty: 90 TABLET | Refills: 3 | Status: SHIPPED | OUTPATIENT
Start: 2022-05-17 | End: 2022-12-23 | Stop reason: SDUPTHER

## 2022-05-17 RX ORDER — PREDNISONE 10 MG/1
10 TABLET ORAL 2 TIMES DAILY
Qty: 14 TABLET | Refills: 0 | Status: SHIPPED | OUTPATIENT
Start: 2022-05-17 | End: 2022-05-24

## 2022-05-17 RX ORDER — DEXAMETHASONE SODIUM PHOSPHATE 4 MG/ML
8 INJECTION, SOLUTION INTRA-ARTICULAR; INTRALESIONAL; INTRAMUSCULAR; INTRAVENOUS; SOFT TISSUE
Status: DISCONTINUED | OUTPATIENT
Start: 2022-05-17 | End: 2022-05-17

## 2022-05-17 RX ORDER — IPRATROPIUM BROMIDE AND ALBUTEROL SULFATE 2.5; .5 MG/3ML; MG/3ML
3 SOLUTION RESPIRATORY (INHALATION) EVERY 6 HOURS PRN
Qty: 75 ML | Refills: 0 | Status: SHIPPED | OUTPATIENT
Start: 2022-05-17 | End: 2022-10-05 | Stop reason: SDUPTHER

## 2022-05-17 RX ORDER — AMOXICILLIN AND CLAVULANATE POTASSIUM 875; 125 MG/1; MG/1
1 TABLET, FILM COATED ORAL EVERY 12 HOURS
Qty: 14 TABLET | Refills: 0 | Status: SHIPPED | OUTPATIENT
Start: 2022-05-17 | End: 2022-05-24

## 2022-05-17 RX ORDER — PREDNISONE 10 MG/1
10 TABLET ORAL 2 TIMES DAILY
Qty: 7 TABLET | Refills: 0 | Status: SHIPPED | OUTPATIENT
Start: 2022-05-17 | End: 2022-05-17

## 2022-05-17 RX ADMIN — DEXAMETHASONE SODIUM PHOSPHATE 8 MG: 4 INJECTION, SOLUTION INTRA-ARTICULAR; INTRALESIONAL; INTRAMUSCULAR; INTRAVENOUS; SOFT TISSUE at 07:05

## 2022-05-17 RX ADMIN — IPRATROPIUM BROMIDE AND ALBUTEROL SULFATE 3 ML: 2.5; .5 SOLUTION RESPIRATORY (INHALATION) at 07:05

## 2022-05-17 NOTE — TELEPHONE ENCOUNTER
Good morning please adjust pt's disp for her prednisone.  Instruction read take 1 table twice a day for seven days, but you only disp 7 please change tabs to 14 or once a day.

## 2022-05-17 NOTE — TELEPHONE ENCOUNTER
----- Message from Lolly Morgan sent at 5/17/2022  8:21 AM CDT -----  Contact: [facility] [phone]  Type:  Pharmacy Calling to Clarify an RX    Name of Caller: Hanh    Pharmacy Name: [facility]    Prescription Name: predniSONE (DELTASONE) 10 MG tablet    What do they need to clarify? Rx doesn't match directions or quantity.    Best Call Back Number: [phone]

## 2022-05-17 NOTE — PROGRESS NOTES
Patient received decadron  injection  To right upper outer gluteus ,pt tolerated injection well.  Patient received duoneb neb tx tolerated well.

## 2022-05-17 NOTE — PROGRESS NOTES
Chief Complaint  Chief Complaint   Patient presents with    Wheezing    Cough       HPI  Yanni Kaiser is a 51 y.o. female with medical diagnoses as listed in the medical history and problem list that presents for Asthma exacerbation in COPD.  This patient is new to me.      1. Asthma exacerbation in COPD: Reports this exacerbation has been going on for approximately a week. Reports she does not have a machine any more. Reports season change causes exacerbations. Patient has a history of HTN and DM. Reports she is complaint with medication and has adjusted her diet. Vitals are stable today in clinic.     Pertinent negatives are chest pain/palpitations, GI upset, urinary/bowel changes, unexplained weight loss/gain, dizziness/headaches/syncope.       PAST MEDICAL HISTORY:  Past Medical History:   Diagnosis Date    Allergy     Anemia     Anxiety     Asthma     Back pain     Chronic bronchitis     Chronic obstructive pulmonary disease, unspecified COPD type 4/1/2022    Cigarette smoker     DDD (degenerative disc disease), lumbar 6/9/2020    Depression     Diabetes mellitus with peripheral circulatory disorder 4/1/2022    Diabetes with neurologic complications     DUB (dysfunctional uterine bleeding) 10/16/2018    GERD (gastroesophageal reflux disease)     High cholesterol     Hyperprolactinemia     Hypertension     Influenza A 01/16/2020    Neuromuscular disorder     Obese body habitus     Obesity     Pseudotumor cerebri     Renal manifestation of secondary diabetes mellitus     Respiratory failure     Seizures     Simple endometrial hyperplasia 2018    Sleep apnea     Smoker     Tobacco dependence     Urinary incontinence        PAST SURGICAL HISTORY:  Past Surgical History:   Procedure Laterality Date    ANTROSTOMY OF MAXILLARY SINUS AT INFERIOR NASAL MEATUS  10/22/2021    Procedure: MAXILLARY ANTROSTOMY, AT INFERIOR NASAL MEATUS;  Surgeon: John Prado III, MD;  Location:  [facility] OR;  Service: ENT;;    BREAST CYST ASPIRATION       SECTION      X 1    CHOLECYSTECTOMY      COLONOSCOPY      COLONOSCOPY N/A 2019    Procedure: COLONOSCOPY;  Surgeon: Jagruti Sweeney MD;  [facility] ([facility]ND FLR);  Service: Endoscopy;  Laterality: N/A;  BMI is 63/gastroparesis/3 days full liquid diet 1 day clear liquid see telephone encounter by Ciarasidney Pinto [facility]    EPIDURAL STEROID INJECTION N/A 2020    Procedure: INJECTION, STEROID, EPIDURAL, L5-S1 IL;  Surgeon: Lawrence Marin MD;  Location: [facility] PAIN MGT;  Service: Pain Management;  Laterality: N/A;    ESOPHAGEAL MANOMETRY WITH MEASUREMENT OF IMPEDANCE N/A 2019    Procedure: MANOMETRY-ESOPHAGEAL-WITH IMPEDANCE;  Surgeon: Jagruti Sweeney MD;  [facility]  Service: Endoscopy;  Laterality: N/A;  Hold Narcotics x 1 days   Hold TCA x 1 days  Propofol only. No fentanyl or benzodiazepine during sedation. If additional sedation needed, discuss with Dr. Sweeney.  10/29 - pt confirmed appt    ESOPHAGOGASTRODUODENOSCOPY N/A 2019    Procedure: EGD (ESOPHAGOGASTRODUODENOSCOPY);  Surgeon: Jagruti Sweeney MD;  Location: [facility] (2ND FLR);  Service: Endoscopy;  Laterality: N/A;  BMI is 63/gastroparesis/3 days full liquid diet 1 day clear liquid see telephone encounter by Ciara [facility]    ESOPHAGOGASTRODUODENOSCOPY N/A 2019    Procedure: ESOPHAGOGASTRODUODENOSCOPY (EGD);  Surgeon: Jagruti Sweeney MD;  Location: [facility] (2ND FLR);  Service: Endoscopy;  Laterality: N/A;  EGD with EndoFlip /+/- Botox  2nd floor for gastroparesis/BMI 63 **367lbs**  Bariatric Stretcher needed  Manometry probe to be placed endoscopically during EGD procedure  Due to change in protocol, Full liquid diet for 3 days/ 1 day of clear liquids  Hi    ESOPHAGOGASTRODUODENOSCOPY N/A 2020    Procedure: ESOPHAGOGASTRODUODENOSCOPY (EGD) with BRAVO;  Surgeon: Jagruti Sweeney MD;  [facility]: [facility] ([facility]ND FLR);   Service: Endoscopy;  Laterality: N/A;  with Dilation  2nd floor due to BMI 56.20 (327 lbs) and gastroparesis  3 days full liquid diet and 1 day clears    ESOPHAGOGASTRODUODENOSCOPY N/A 4/15/2021    Procedure: ESOPHAGOGASTRODUODENOSCOPY (EGD);  Surgeon: Jagruti Sweeney MD;  Location: [facility] (2ND FLR);  Service: Endoscopy;  Laterality: N/A;  Endoflip  2nd floor-gastroparesis, BMI 57.18, bariatric stretcher  full liquid diet x3 days, clear liquid diet x1 day prior to procedure  propofol only  covid test 4/12 primary care, instructions emailed-[facility]    FOOT FRACTURE SURGERY Left     FUNCTIONAL ENDOSCOPIC SINUS SURGERY (FESS) USING COMPUTER-ASSISTED NAVIGATION Bilateral 10/22/2021    Procedure: FESS, USING COMPUTER-ASSISTED NAVIGATION;  Surgeon: John Prado III, MD;  Location: [facility];  Service: ENT;  Laterality: Bilateral;  FOLLOW DR PRADO ANESTHESIA PROTOCAL / pt will stay 23 hour post surgery for SHARATH observation    HYSTEROSCOPY WITH DILATION AND CURETTAGE OF UTERUS N/A 10/16/2018    [facility]    INJECTION OF ANESTHETIC AGENT AROUND NERVE Bilateral 10/23/2020    Procedure: BLOCK, NERVE  bilateral L3,4,5 MBB;  Surgeon: Lawrence Marin MD;  Location: [facility] PAIN MGT;  Service: Pain Management;  Laterality: Bilateral;  bilateral L3,4,5 MBB   consent needed    INJECTION OF ANESTHETIC AGENT AROUND NERVE Bilateral 2/18/2022    Procedure: BLOCK, NERVE, L3-L4-L5 MEDIAL BRANCH;  Surgeon: Lawrence Marin MD;  Location: [facility] PAIN MGT;  Service: Pain Management;  Laterality: Bilateral;    PLANTAR FASCIOTOMY Left 11/18/2019    Procedure: FASCIOTOMY, PLANTAR;  Surgeon: Valentino Orourke DPM;  Location: [facility] 2ND FLR;  Service: Podiatry;  Laterality: Left;    RETINAL LASER PROCEDURE Left     SINUS SURGERY      SPHENOIDECTOMY Bilateral 10/22/2021    Procedure: SPHENOIDECTOMY;  Surgeon: John Prado III, MD;  Location: [facility];  Service: ENT;  Laterality: Bilateral;    TRANSFORAMINAL EPIDURAL INJECTION OF STEROID  Bilateral 6/24/2020    Procedure: INJECTION, STEROID, EPIDURAL, TRANSFORAMINAL APPROACH;  Surgeon: Lawrence Marin MD;  Location: [facility] PAIN MGT;  Service: Pain Management;  Laterality: Bilateral;    TRANSFORAMINAL EPIDURAL INJECTION OF STEROID Bilateral 1/28/2022    Procedure: INJECTION, STEROID, EPIDURAL, TRANSFORAMINAL APPROACH  Bilateral TFESI L4/L5      NEEDS CONSENT;  Surgeon: Lawrence Marin MD;  Location: [facility] PAIN [facility]GT;  Service: Pain Management;  Laterality: Bilateral;    UPPER GASTROINTESTINAL ENDOSCOPY         SOCIAL HISTORY:  Social History     Socioeconomic History    Marital status:    Tobacco Use    Smoking status: Current Every Day Smoker     Packs/day: 1.00     Years: 27.00     Pack years: 27.00     Types: Cigarettes     Start date: 1/1/1992    Smokeless tobacco: Never Used    Tobacco comment: 1/2 a pack if her days are good   Substance and Sexual Activity    Alcohol use: Not Currently     Alcohol/week: 0.0 standard drinks     Comment: socially    Drug use: No    Sexual activity: Yes     Partners: Male     Birth control/protection: None     Comment:      Social Determinants of Health     Financial Resource Strain: Medium Risk    Difficulty of Paying Living Expenses: Somewhat hard   Food Insecurity: Food Insecurity Present    Worried About Running Out of Food in the Last Year: Sometimes true    Ran Out of Food in the Last Year: Sometimes true   Transportation Needs: No Transportation Needs    Lack of Transportation (Medical): No    Lack of Transportation (Non-Medical): No   Physical Activity: Inactive    Days of Exercise per Week: 0 days    Minutes of Exercise per Session: 0 min   Stress: Stress Concern Present    Feeling of Stress : To some extent   Social Connections: Unknown    Frequency of Communication with Friends and Family: Never    Frequency of Social Gatherings with Friends and Family: Never    Active Member of Clubs or Organizations: Yes    Attends Club  or Organization Meetings: More than 4 times per year    Marital Status:    Housing Stability: High Risk    Unable to Pay for Housing in the Last Year: Yes    Number of Places Lived in the Last Year: 2    Unstable Housing in the Last Year: Yes       FAMILY HISTORY:  Family History   Problem Relation Age of Onset    Hypertension Mother     Diabetes Mother     Colon cancer Mother 50    Colon polyps Mother     Cataracts Mother     Heart disease Father     COPD Father     Heart attack Father     Hypertension Father     Ulcers Father     Cataracts Father     Glaucoma Father     Cancer Maternal Grandmother     Breast cancer Maternal Grandmother     Colon cancer Maternal Grandmother     Colon polyps Maternal Grandmother     No Known Problems Paternal Grandmother     No Known Problems Brother     No Known Problems Maternal Aunt     No Known Problems Maternal Uncle     No Known Problems Paternal Aunt     No Known Problems Paternal Uncle     No Known Problems Maternal Grandfather     No Known Problems Paternal Grandfather     No Known Problems Daughter     No Known Problems Son     No Known Problems Daughter     No Known Problems Daughter     Celiac disease Neg Hx     Cirrhosis Neg Hx     Crohn's disease Neg Hx     Cystic fibrosis Neg Hx     Esophageal cancer Neg Hx     Hemochromatosis Neg Hx     Inflammatory bowel disease Neg Hx     Irritable bowel syndrome Neg Hx     Liver cancer Neg Hx     Liver disease Neg Hx     Rectal cancer Neg Hx     Stomach cancer Neg Hx     Ulcerative colitis Neg Hx     Jonnie's disease Neg Hx     Tuberculosis Neg Hx     Lymphoma Neg Hx     Scleroderma Neg Hx     Rheum arthritis Neg Hx     Melanoma Neg Hx     Multiple sclerosis Neg Hx     Psoriasis Neg Hx     Lupus Neg Hx     Skin cancer Neg Hx     Amblyopia Neg Hx     Blindness Neg Hx     Macular degeneration Neg Hx     Retinal detachment Neg Hx     Strabismus Neg Hx     Stroke Neg  Hx     Thyroid disease Neg Hx     Allergic rhinitis Neg Hx     Allergies Neg Hx     Angioedema Neg Hx     Asthma Neg Hx     Eczema Neg Hx     Immunodeficiency Neg Hx     Rhinitis Neg Hx     Urticaria Neg Hx     Atopy Neg Hx        ALLERGIES AND MEDICATIONS: updated and reviewed.  Review of patient's allergies indicates:   Allergen Reactions    Gabapentin Other (See Comments)     Makes her twitch     Current Outpatient Medications   Medication Sig Dispense Refill    albuterol (PROVENTIL) 2.5 mg /3 mL (0.083 %) nebulizer solution Take 3 mLs (2.5 mg total) by nebulization every 6 (six) hours as needed for Wheezing. Rescue 3 mL 11    albuterol (PROVENTIL/VENTOLIN HFA) 90 mcg/actuation inhaler Inhale 2 puffs into the lungs every 6 (six) hours as needed for Wheezing or Shortness of Breath. Rescue 54 g 6    alcohol swabs (BD ALCOHOL SWABS) PadM Apply 1 each topically 2 (two) times a day. 200 each 4    ammonium lactate 12 % Crea Apply twice daily to affected parts both feet as needed. 140 g 11    azelastine (ASTELIN) 137 mcg (0.1 %) nasal spray 1 spray (137 mcg total) by Nasal route 2 (two) times daily. 30 mL 11    blood sugar diagnostic Strp 1 each by Misc.(Non-Drug; Combo Route) route 4 (four) times daily before meals and nightly. ACCU CHEK ELVIA PLUS METER 200 each 3    blood-glucose meter (ACCU-CHEK ELVIA PLUS METER) Misc TEST FOUR TIMES DAILY BEFORE MEALS AND EVERY NIGHT 90 each 1    budesonide-formoterol 160-4.5 mcg (SYMBICORT) 160-4.5 mcg/actuation HFAA Inhale 2 puffs into the lungs every 12 (twelve) hours. Controller 10.2 g 11    cetirizine (ZYRTEC) 10 MG tablet Take 1 tablet (10 mg total) by mouth daily as needed for Allergies (itching). 30 tablet 0    diclofenac sodium (VOLTAREN) 1 % Gel Apply 2 g topically 2 (two) times daily. 100 g 2    EPINEPHrine (EPIPEN) 0.3 mg/0.3 mL AtIn Inject 0.3 mLs (0.3 mg total) into the muscle once. for 1 dose 2 each 1    estradioL (ESTRACE) 0.01 % (0.1 mg/gram)  vaginal cream Use 1 gram of estrogen cream in vagina nightly x 2 weeks, then twice a week thereafter. 42.5 g 3    fluticasone propionate (FLONASE) 50 mcg/actuation nasal spray 2 sprays (100 mcg total) by Each Nostril route 2 (two) times a day. 16 g 11    JARDIANCE 10 mg tablet TAKE 1 TABLET(10 MG) BY MOUTH EVERY DAY 90 tablet 0    lancets (ACCU-CHEK FASTCLIX LANCET DRUM) Misc 1 Device by Misc.(Non-Drug; Combo Route) route 2 (two) times a day. 200 each 4    lancets (ACCU-CHEK SOFTCLIX LANCETS) Misc 1 Device by Misc.(Non-Drug; Combo Route) route 4 (four) times daily. 200 each 3    lidocaine (LIDODERM) 5 % Place 1 patch onto the skin once daily. Remove & Discard patch within 12 hours or as directed by MD 15 patch 0    LIDOcaine HCL 2% (XYLOCAINE) 2 % jelly Apply topically as needed. Apply topically once nightly to affected part of foot/feet. 30 mL 2    losartan (COZAAR) 100 MG tablet Take 1 tablet (100 mg total) by mouth once daily. 90 tablet 0    montelukast (SINGULAIR) 10 mg tablet Take 1 tablet (10 mg total) by mouth every evening. 90 tablet 1    MOVANTIK 12.5 mg Tab Take 1 tablet by mouth once daily.      nicotine (NICODERM CQ) 21 mg/24 hr Place 1 patch onto the skin once daily. 28 patch 0    nicotine, polacrilex, (NICORETTE) 2 mg Gum Take 1 each (2 mg total) by mouth as needed (Take 1 piece as needed.  Maximum of 10 per day.). 220 each 0    nystatin-triamcinolone (MYCOLOG) ointment Apply topically 2 (two) times daily. 60 g 2    ondansetron (ZOFRAN) 8 MG tablet Take 1 tablet (8 mg total) by mouth every 8 (eight) hours as needed for Nausea. 90 tablet 11    oxyCODONE-acetaminophen (PERCOCET) 7.5-325 mg per tablet Take 1 tablet by mouth 3 (three) times daily as needed for Pain. 90 tablet 0    OZEMPIC 1 mg/dose (4 mg/3 mL) Inject 1 mg into the skin every 7 days. 3 pen 0    pantoprazole (PROTONIX) 40 MG tablet TAKE 1 TABLET(40 MG) BY MOUTH TWICE DAILY 60 tablet 6    pregabalin (LYRICA) 50 MG capsule  Take 1 capsule (50 mg total) by mouth 3 (three) times daily. 90 capsule 3    QUEtiapine (SEROQUEL) 25 MG Tab Take 1 tablet (25 mg total) by mouth once daily. 90 tablet 0    topiramate (TOPAMAX) 50 MG tablet Take 2 tablets (100 mg total) by mouth every evening. 180 tablet 3    venlafaxine (EFFEXOR-XR) 75 MG 24 hr capsule Take 1 capsule (75 mg total) by mouth once daily. NO FURTHER REFILL WITHOUT APT 90 capsule 1    albuterol-ipratropium (DUO-NEB) 2.5 mg-0.5 mg/3 mL nebulizer solution Take 3 mLs by nebulization every 6 (six) hours as needed for Wheezing. Rescue 75 mL 0    amoxicillin-clavulanate 875-125mg (AUGMENTIN) 875-125 mg per tablet Take 1 tablet by mouth every 12 (twelve) hours. for 7 days 14 tablet 0    levocetirizine (XYZAL) 5 MG tablet Take 1 tablet (5 mg total) by mouth every evening. 90 tablet 3    predniSONE (DELTASONE) 10 MG tablet Take 1 tablet (10 mg total) by mouth 2 (two) times daily. for 7 days 7 tablet 0    sodium chloride (SALINE NASAL) 0.65 % nasal spray 2 sprays by Nasal route as needed for Congestion. 104 mL 12     Current Facility-Administered Medications   Medication Dose Route Frequency Provider Last Rate Last Admin    albuterol inhaler 2 puff  2 puff Inhalation Q20 Min PRN Carlyle Gordillo MD        albuterol-ipratropium 2.5 mg-0.5 mg/3 mL nebulizer solution 3 mL  3 mL Nebulization 1 time in Clinic/HOD Charlie Loyola, ARPITA        dexamethasone injection 8 mg  8 mg Intravenous 1 time in Clinic/HOD Charlie Loyola, ARPITA        diphenhydrAMINE injection 25 mg  25 mg Intravenous Once PRN Carlyle Gordillo MD        EPINEPHrine (EPIPEN) 0.3 mg/0.3 mL pen injection 0.3 mg  0.3 mg Intramuscular PRN Carlyle Gordillo MD        methylPREDNISolone sodium succinate injection 40 mg  40 mg Intravenous Once PRN Carlyle Gordillo MD        ondansetron disintegrating tablet 4 mg  4 mg Oral Once PRN Carlyle Gordillo MD        sodium chloride 0.9% 500 mL flush bag   Intravenous PRN Carlyle Gordillo MD   "      sodium chloride 0.9% flush 10 mL  10 mL Intravenous PRN Carlyle Gordillo MD             ROS  Review of Systems   Constitutional: Negative for appetite change, chills, fatigue and fever.   HENT: Positive for congestion, postnasal drip and rhinorrhea. Negative for ear pain, sinus pressure, sneezing and sore throat.    Respiratory: Positive for cough and shortness of breath.    Cardiovascular: Negative for chest pain and palpitations.   Gastrointestinal: Negative for abdominal pain, constipation, diarrhea, nausea and vomiting.   Genitourinary: Negative for dysuria.   Musculoskeletal: Negative for arthralgias.   Neurological: Negative for headaches.   Psychiatric/Behavioral: Negative for sleep disturbance.           Physical Exam  Vitals:    05/17/22 0710   BP: 130/64   Pulse: 72   Temp: 99 °F (37.2 °C)   TempSrc: Oral   SpO2: 97%   Weight: (!) 162.2 kg (357 lb 9.4 oz)   Height: 5' 4" (1.626 m)    Body mass index is 61.38 kg/m².  Weight: (!) 162.2 kg (357 lb 9.4 oz)   Height: 5' 4" (162.6 cm)   Physical Exam  Constitutional:       General: She is not in acute distress.     Appearance: Normal appearance. She is obese.   HENT:      Head: Normocephalic and atraumatic.      Right Ear: External ear normal.      Left Ear: External ear normal.      Nose: Nose normal.   Eyes:      Pupils: Pupils are equal, round, and reactive to light.   Cardiovascular:      Rate and Rhythm: Normal rate and regular rhythm.      Pulses: Normal pulses.   Pulmonary:      Effort: Pulmonary effort is normal. No respiratory distress.      Breath sounds: Examination of the right-upper field reveals wheezing. Examination of the left-upper field reveals wheezing. Examination of the right-middle field reveals wheezing. Examination of the left-middle field reveals wheezing. Examination of the right-lower field reveals wheezing. Examination of the left-lower field reveals wheezing. Wheezing present.   Abdominal:      General: Bowel sounds are " normal.      Palpations: Abdomen is soft.   Musculoskeletal:      Cervical back: Neck supple.   Lymphadenopathy:      Cervical: No cervical adenopathy.   Skin:     General: Skin is warm and dry.      Capillary Refill: Capillary refill takes less than 2 seconds.   Neurological:      General: No focal deficit present.      Mental Status: She is alert and oriented to person, place, and time.   Psychiatric:         Mood and Affect: Mood normal.             Health Maintenance       Date Due Completion Date    Sign Pain Contract Never done ---    Diabetes Urine Screening 05/30/2019 5/30/2018    COVID-19 Vaccine (4 - Booster for Pfizer series) 03/30/2022 12/30/2021    Lipid Panel 04/14/2022 4/14/2021    Eye Exam 06/09/2022 6/9/2021    Hemoglobin A1c 06/30/2022 3/30/2022    Override on 5/1/2015: Done    Foot Exam 11/29/2022 11/29/2021    Override on 3/7/2018: Done    Mammogram 12/16/2023 12/16/2021    Colorectal Cancer Screening 08/10/2026 8/10/2021    Cervical Cancer Screening 01/31/2027 1/31/2022    TETANUS VACCINE 08/19/2029 8/19/2019    Pneumococcal Vaccines (Age 0-64) (3 - PPSV23 or PCV20) 08/04/2037 4/25/2017            Assessment & Plan  Problem List Items Addressed This Visit        Psychiatric    Recurrent major depressive disorder, in full remission  -The current medical regimen is effective;  continue present plan and medications.        Cardiac/Vascular    Essential hypertension (Chronic)  -The current medical regimen is effective;  continue present plan and medications.        Renal/    Stage 3 chronic kidney disease (Chronic)  -Stable; Monitor       Endocrine    Type 2 diabetes mellitus with stage 3 chronic kidney disease, with long-term current use of insulin  - Appt in 1 month to assess efficacy of current treatment plan        Other    Impaired functional mobility, balance, gait, and endurance      Other Visit Diagnoses     Asthma exacerbation in COPD    -  Primary  -We discussed proper administration of  Prednisone and Augmentin, adverse effects and side effects with education given for reinforcement  - Patient tolerated Decadron and Duo-Neb well  -We discussed ER precautions in the event her symptoms become worse with current treatment plan in place  -Patient verbalized understanding of everything discussed in clinic.    Relevant Medications    dexamethasone injection 8 mg    predniSONE (DELTASONE) 10 MG tablet    amoxicillin-clavulanate 875-125mg (AUGMENTIN) 875-125 mg per tablet    albuterol-ipratropium (DUO-NEB) 2.5 mg-0.5 mg/3 mL nebulizer solution    albuterol-ipratropium 2.5 mg-0.5 mg/3 mL nebulizer solution 3 mL (Start on 5/17/2022  7:45 AM)    Other Relevant Orders    NEBULIZER KIT (SUPPLIES) FOR HOME USE    NEBULIZER FOR HOME USE    Pruritus sine materia      -The current medical regimen is effective;  continue present plan and medications.     Relevant Medications    levocetirizine (XYZAL) 5 MG tablet            Health Maintenance reviewed: Denied HM at this time    Follow-up: 1 month for DM follow-up

## 2022-05-18 ENCOUNTER — CLINICAL SUPPORT (OUTPATIENT)
Dept: REHABILITATION | Facility: OTHER | Age: 52
End: 2022-05-18
Payer: MEDICARE

## 2022-05-18 ENCOUNTER — OFFICE VISIT (OUTPATIENT)
Dept: PSYCHIATRY | Facility: OTHER | Age: 52
End: 2022-05-18
Payer: MEDICARE

## 2022-05-18 DIAGNOSIS — F40.298 FEAR OF PAIN: ICD-10-CM

## 2022-05-18 DIAGNOSIS — Z74.09 IMPAIRED FUNCTIONAL MOBILITY, BALANCE, GAIT, AND ENDURANCE: Primary | ICD-10-CM

## 2022-05-18 DIAGNOSIS — F33.42 RECURRENT MAJOR DEPRESSIVE DISORDER, IN FULL REMISSION: ICD-10-CM

## 2022-05-18 DIAGNOSIS — Z74.09 IMPAIRED FUNCTIONAL MOBILITY AND ACTIVITY TOLERANCE: ICD-10-CM

## 2022-05-18 DIAGNOSIS — Z78.9 ALTERATION IN PERFORMANCE OF ACTIVITIES OF DAILY LIVING: Primary | ICD-10-CM

## 2022-05-18 DIAGNOSIS — Z78.9 DECREASED ACTIVITIES OF DAILY LIVING (ADL): ICD-10-CM

## 2022-05-18 DIAGNOSIS — F43.29 ADJUSTMENT DISORDER WITH PHYSICAL COMPLAINTS: Primary | ICD-10-CM

## 2022-05-18 DIAGNOSIS — G89.4 CHRONIC PAIN DISORDER: ICD-10-CM

## 2022-05-18 PROCEDURE — 90791 PR PSYCHIATRIC DIAGNOSTIC EVALUATION: ICD-10-PCS | Mod: ,,, | Performed by: SOCIAL WORKER

## 2022-05-18 PROCEDURE — 4010F ACE/ARB THERAPY RXD/TAKEN: CPT | Mod: CPTII,,, | Performed by: SOCIAL WORKER

## 2022-05-18 PROCEDURE — 3052F HG A1C>EQUAL 8.0%<EQUAL 9.0%: CPT | Mod: CPTII,,, | Performed by: SOCIAL WORKER

## 2022-05-18 PROCEDURE — 97110 THERAPEUTIC EXERCISES: CPT

## 2022-05-18 PROCEDURE — 97530 THERAPEUTIC ACTIVITIES: CPT

## 2022-05-18 PROCEDURE — 4010F PR ACE/ARB THEARPY RXD/TAKEN: ICD-10-PCS | Mod: CPTII,,, | Performed by: SOCIAL WORKER

## 2022-05-18 PROCEDURE — 3052F PR MOST RECENT HEMOGLOBIN A1C LEVEL 8.0 - < 9.0%: ICD-10-PCS | Mod: CPTII,,, | Performed by: SOCIAL WORKER

## 2022-05-18 PROCEDURE — 90791 PSYCH DIAGNOSTIC EVALUATION: CPT | Mod: ,,, | Performed by: SOCIAL WORKER

## 2022-05-18 NOTE — PROGRESS NOTES
"[facility]   Functional Restoration Program  Occupational Therapy Daily Note  FRP Day 14    Name: Yanni Kaiser  Medical Record Number: [id number]  Visit date: 5/18/2022    Therapy Diagnosis:   Encounter Diagnoses   Name Primary?    Alteration in performance of activities of daily living Yes    Fear of pain      Physician: Snow Collazo NP  Physician Orders: OT Eval and Treat  Medical Diagnosis: Chronic pain disorder  Evaluation Date: 3/15/2022  Insurance Authorization Period Expiration: 12/31/2022  Plan of Care Certification Period: 5/27/2022  Visit # / Visits authorized: 10 / 20  FOTO: evaluation: 60% limitation    Time In: 2:45 PM  Time Out: 4:00 PM  Total Billable Time: 75 minutes    Subjective     Yanni reports "I had a bad asthma attack yesterday and had to go get a breathing treatment and steroid shot but im feeling a little better today. And I feel good about all of this, a lot of it has become a habit already".        Pain: 6 / 10  Location: middle low back     Personal Functional Three Month Goals: Performance and satisfaction noted below.    OBJECTIVE      Individualized Treatment: Increased resistance levels of functional conditioning exercises according to personal recovery goals. Activities include: Dynamic reaching, [id number] lifting, sustained standing activity, maximal lifting, 2-handed carry, sustained seated tasks, push and pull, waist-to-shoulder lift, box/container carry, endurance training. Daily functional conditioning progress is documented on a flow sheet which is available upon request.      Yanni participated in dynamic, functional therapeutic activities/ lifting/endurance activities in order to increase pt's participation with vocational, selfcare ADLs, and leisure activities in order to improve quality of life for 75 minutes, including:     Yanni participated in endurance activity using Nustep for 10 minutes, starting at level 3.0 " to 2.7 (2/2 increased fatigue and breathing difficulty 2/2 asthma flare) to improve functional endurance and progress towards increased MET level for improved community mobility and participation, rated as hard (5/10) exertion, Yanni re-educated on how to add mindfulness component to activity and how to pace appropriately by completed self check ins and grading activity as needed, with goal to reach moderate to sort of hard by end of activity. Completed 1107 steps.    Yanni completed dynamic reaching from seated 2/2 increased fatigue, with continued focus on core awareness, standing posture, hip rotation/weight shifting, slow and controlled moving, pacing as needed. Completed 3x5 reps x 2 lbs, rated as sort of hard (4/10) exertion.      Yanni completed functional lifting, floor to waist, 3x5 reps x 8 lbs with exertion rated hard (5/10); focused education on pacing during task performance. Took standing rest break midway to focus on deep breathing. Pt plans to use this lift related to picking up items at the house. With SOB post activity.     Pt completed seated mindfulness diaphragmatic breathing activity [id number] with focus on posture, mechanics of breathing, and benefits re: PNS activation. Pt voiced and demonstrated understanding.     Yanni participated in functional lift waist to shoulder, 3x5 reps x 15 lbs with a rating of sort of hard (4/10) exertion. Yanni re-educated on standing posture, core awareness, keeping shoulders depressed and retracted. Increased fatigue throughout activity but with good motivation and use of standing stretches as needed.    Pt re-educated on proper mechanics to get on and off floor using chair for support. Pt educated on steps to take to ensure safety if she were to have a fall: roll to back, complete body scan to see if all body parts are okay, roll to side, hands/knees, crawl to chair and then use steps to get on/off floor. Pt demonstrated understanding x2reps. Pt  "with increased self-efficacy after completion of activity. Pt rated as sort of hard (4/10) exertion.     Pt completed yoga activity on mat, supine, quadriped, seated and standing, with focused education on getting on/off floor properly and safely, back mobility, hip flexibility, stress reduction, dynamic standing balance, posture, alignment and coordinating movement with breath, rated hard (5/10). Added focus on hamstring flexibility and independent traction in back with chapis pose. "its hard but its nice at the same time". Once back in seated position in chair she noted "that does feel good on my back". While seated discussed plan post program to join [facility] [employer].     Yanni completed maximal lift 2x5 reps with 14 lbs, rated as hard (5/10) exertion, continued education focused on neutral spine positioning and pushing through heels for safety and activation of correct muscles. Continues to discuss increased pain in B feet but notes that pain has decreased. Re-educated on toes and knees tracking together to minimize torque and decrease pressure in B knees.     Pt completed seated mindfulness diaphragmatic breathing activity [id number] with focus on posture, mechanics of breathing, and benefits re: PNS activation. Pt voiced and demonstrated understanding.      Patient Education and Home Exercises     Education provided:   - keke scale, pain cycle, z-lie, functional lifting mechanics, pursed lip and diaphragmatic breathing techniques  - 4-7-8 breathing technique.    Written Home Exercises Provided: Patient instructed to cont prior HEP. Exercises were reviewed and Yanni was able to demonstrate them prior to the end of the session.  Yanni demonstrated good  understanding of the education provided. See EMR under Patient Instructions for information provided during therapy sessions.    ASSESSMENT     Yanni presents with increased fatigue and with breathing difficulty 2/2 asthma flare up. Despite this, she had " good tolerance overall to session despite increased time required to complete activities. Good continued willingness to participate and with continued self report of carry over with functional activity tolerance and self management techniques. Overall, continues to progress towards personal goals.     Yanni Is progressing well towards her goals. Pt prognosis is Excellent.     Pt will continue to benefit from skilled outpatient occupational therapy to address the deficits listed in the problem list box on initial evaluation, provide pt/family education and to maximize pt's level of independence in the home and community environment.      Pt's spiritual, cultural and educational needs considered and pt agreeable to plan of care and goals.     FRP Goals: While working towards the long-term (3 month) functional goals listed above, Bobby accomplish the following short term goals during the 5-week intensive rehabilitation program. Yanni will:     1. Develop a viable return to community participation plan. ------------progressing 5/5/22  2. Demonstrate physical capacities consistent with a medium work demand level. ------------progressing 5/5/22  3. Demonstrate increased functional strength by lifting 20 lbs floor to waist and 20 lbs waist to shoulder in order to meet demand of work/home routine. ------------MET 5/5/22  4. Increase her positional tolerance to the level needed for work and home activities. ------------progressing 5/5/22  5. Implement self-care strategies during the program and at home to control pain. ------------progressing 5/5/22  6.   Patient will demonstrate use of mindfulness, stress management, and coping  strategies for improved participation in daily routine. ------------progressing 5/5/22     UPDATED GOALS 5/5/22:  1.Demonstrate increased functional strength by lifting 30 lbs floor to waist and 25 lbs waist to shoulder in order to meet demand of work/home routine      Specific patient  expressed goals and demand levels:  Current Capacity Limit/ Physical  Demand Level (PDL)    Demand Levels of Functional Recovery Goals     Performance/Satisfaction  Day 1 Performance/Satisfaction  Day 10 Performance/Satisfaction  Follow-up      Light Physical Demand  (Based above tested results)     Vocational:  Attend /participate in [employer]     Recreational:  Walking      Increased socialization     Daily Living:  Cooking      Cleaning         Medium Physical Demand Level      1 / 1            1 / 0       1 / 0             3 / 1       1 / 1      5 / 2            5 / 5      4 / 3            3 / 3      5 / 5     The PDL listed above is based on the physical performance screening test completed at evaluation. More comprehensive physical testing integrated with clinical findings would be required to derived an accurate work capacity.       PLAN   UPDATED plan of care certification 5/5/2022 to 7/15/2022     Outpatient occupational therapy, 3 x/wks for 2 additional weeks. Following this, pt to go on hold for a minimum of 7 weeks to attempt independence w/ Home Activity Plan (HAP) & PT activities, and will then return for reassessment.    Mira Ryder, OTR/L  5/18/2022

## 2022-05-18 NOTE — PROGRESS NOTES
[facility]    Functional Restoration Program  Physical Therapy Treatment Note  FRP Day 14    Name: Yanni Kaiser  MRN:[id number]      Therapy Diagnosis:   Encounter Diagnoses   Name Primary?    Impaired functional mobility, balance, gait, and endurance Yes    Impaired functional mobility and activity tolerance     Decreased activities of daily living (ADL)     Recurrent major depressive disorder, in full remission     Chronic pain disorder      Physician: Snow Collazo NP    Date: 5/18/2022  Physician Orders: PT Eval and Treat   Medical Diagnosis from Referral:   G89.4 (ICD-10-CM) - Chronic pain disorder   Z78.9 (ICD-10-CM) - Decreased activities of daily living (ADL)   Z74.09 (ICD-10-CM) - Impaired functional mobility and activity tolerance         Evaluation Date: 3/15/2022  Authorization Period Expiration: 12/31/2022  Plan of Care Expiration: 6/10/2022  Visit # / Visits authorized: 11 / 20  FOTO: 2/ 3      Precautions: Standard, Diabetes and Fall     Time In:  1:30 pm  Time Out: 2:45 pm  Total Appointment Time (timed & untimed codes): 75 minutes        SUBJECTIVE     Yanni reports being in sig pain yesterday and taking a pain pill.  Pt report missing last session due to illness/asthma flare up and note this has been going on since the weekend.    Pt visited MD and received a steroid injection leading to somewhat improved sx.  Pt state she was not given any exercise restrictions. Pt agree to tx today.   Pt note dec LB sx presentation during tx especially /p mobility exercises.        Patient's FRP goals: improve her function so she can enjoy life again      Current 7/10  Location(s): B low back, L knee       OBJECTIVE     Objective Measures updated at progress report unless specified.       Limitation/Restriction for FOTO lumbar Survey     Therapist reviewed FOTO scores for Yanni Kaiser on 3/15/2022.   FOTO documents entered into EPIC - see Media  section.     Limitation Score: 49%  04/20/22: 71%       Predicted Score: 44%             Treatment       Yanni received the Individualized Treatments listed below:     therapeutic exercises to develop strength, endurance, ROM, flexibility, posture and core stabilization for 75 minutes including:     Full body stretch w/ 3-10 sec hold technique &/or 3-5 repetition technique on all of the following:   Cervical flx/ext   Cervical sidebending   Cervical rotation   Cervical protraction/retraction   Shld rolls   Shld depression/elevation   Scapular retraction/protraction/scaption   Posterior shld stretch (cross arm)   Shld ER stretch (chicken wing)   Elbow flx/ext   Forearm supination/pronation   Wrist flx/ext   Open/close hands/fingers   Seated trunk rotations   Seated trunk/thoracic ext/flx   Seated marches & knee to chest   Seated knee flx/ext   Seated hip ER/piriformis stretch   Seated hamstring stretch   Seated toe raise to heel raise    Seated ankle circles each way   Standing lumbar extensions   Standing lateral leaning stretch   Standing quad stretch (UE support for balance)      Peripheral muscle strengthening which included 1-2 sets of 10-20 repetitions at a slow, controlled 5-7 second per rep pace focused on strengthening supporting musculature for improved body mechanics & functional mobility.  Patient & therapist focused on proper form during treatment to ensure optimal strengthening of each targeted muscle group. Patients are instructed to keep sets/reps with proper load to stay at 3-5 out of 10 on the provided modified Froylan exertion scale.       BATCA Machine Exercises Weight (lbs) Sets Repetitions Froylan Exertion Scale Rating   Leg Extension        Hamstring Curls       Chest Press       Pec Fly       Lat Pull Down 30 1 20 4   Mid Row 30 1 20 4   Bicep Bar Curls 10 1 20 5   Standing Tricep Extension 20 1 20 5   Single Leg Press  R/L  35 1 20 4          Supine: /s lumbar tactile  "lumbar support    LTR x 10    DKTC x 10 elicit "back crack" per pt  SL   Open books x 10 B elicit "back crack" per pt       Fitter board - hard setting, 20 tilt bouts   Fwd/Bwd     Trial 1- 1 hand finger control fade to no hands, slight dec tilt control    Trial 2- EC, 1 hand palmar fade to 1 finger     Lat     Trial 1- no hands, dec tilt control     Trial 2 - EC, 1 hand palmar fade to 1 finger      Standing   Calf stretch 2 x 10 sec hold     Patient Education and Home Exercises     Home Exercises Provided and Patient Education Provided     Education provided: none    Written Home Exercises Provided: Patient instructed to cont prior HEP. Exercises were reviewed and Yanni was able to demonstrate them prior to the end of the session.  Yanni demonstrated fair  understanding of the education provided. See EMR under Patient Instructions for information provided during therapy sessions    ASSESSMENT       Despite pt noted inc generalized discomfort and breathing concern, pt /c good participation today.    Pt subjective report of dec sx presentation /p mobility focus clarify importance of lumbar HEP.  Pt encouraged to use supine HEP exercises for sx self management.  During supine position, pt demo inc lumbar mobility and positional tolerance by not feeling the need to place her hands behind her back when performing exercises.  Pt cont to tolerate resistance progression indicating improved strength.  Pt able to safely determine weight inc parameters and recall names /c min cueing thereby meeting STG for such.    Plan to complete full complement of machine based resistance exercises to offer jump off point to Ind workouts.      Yanni Is progressing well towards her goals.   Pt prognosis is Fair.     Pt will continue to benefit from skilled outpatient physical therapy to address the deficits listed in the problem list box on initial evaluation, provide pt/family education and to maximize pt's level of " independence in the home and community environment.     Pt's spiritual, cultural and educational needs considered and pt agreeable to plan of care and goals.     Anticipated barriers to physical therapy: chronic nature of issues, high BMI    GOALS: Pt is in agreement with the following goals:        Goal Progress Towards Goal   Short Term: In 3 weeks, pt will:     Verbalize & demonstrate good understanding of resisted training with 3-1-3 second rep for qifokavjyj-sjwbqfhhr-kokvwnwbt contraction to encourage full muscle recruitment for strength & endurance Progress towards goal: MET 05/18/22   Verbalize & demonstrate good understanding of home stretch routine to improve AROM, help decrease pain & improve mobility Progress towards goal: PROGRESSING 05/05/22   Demonstrate proper supine or seated diaphragmatic breathing with decreased chest excursion & emphasis on full abdominal excursion & increased expiration time to promote muscle relaxation & increased parasympathetic activity to improve patient tolerance to activities Goal met 5/2/2022   Demonstrate proper static standing posture in frontal & sagittal plane per PT visual assessment to decrease postural strain in ADLs Progress towards goal: PROGRESSING 05/05/22   Demonstrate good recall of names of resisted exercises w/ minimal to moderate verbal cues to promote independence w/ exercise at the end of the program Progress towards goal: MET 05/18/22   Verbalize plan for continuing resisted exercises w/ specific location (i.e. commercial gym, home, community fitness center) indicating preference for machine-based or free-weight exercises to incorporate all major muscle groups to maintain & progress therapeutic functional improvements Progress towards goal: PROGRESSING 05/05/22         Long Term: In 8 weeks, pt will:     Verbalize good understanding of activities planning/scheduling (stretching, mindfulness, resisted training, & cardio) prescribed by PT/OT following  the end of the program to sustain & progress functional improvements Progress towards goal: PROGRESSING 05/05/22   Be independent w/ selected resisted training w/ minimal to no cuing while performing to continue w/ resisted strengthening independently at the end of the program Progress towards goal: PROGRESSING 05/05/22   Improve 2 Minute Walk Test (MWT) to 400 feet and 6 MWT to 1200 feet or greater to improve gait speed and mobility Progress towards goal: MET 05/05/22       Perform single leg stance 10 seconds or greater bilaterally to improve safety and balance in ADLs Progress towards goal: MET 05/05/22       Demonstrate Timed Up & Go (with appropriate assistive device, if necessary) of 10 seconds or less to decrease fall risk and improve functional mobility Progress towards goal: MET 05/05/22       Demonstrate 5 time sit to stand test without upper extremity assist to 15 sec or less to improve general mobility and decrease fall risk Progress towards goal: MET 05/05/22       Score 44 % or less on Lumbar FOTO to indicate significant functional improvements in impaired functions secondary to low back pain Progress towards goal: MET 05/05/22   TO BE RETESTED          PLAN     Continue PT per MADELINE Mitchell, PT, DPT

## 2022-05-18 NOTE — PROGRESS NOTES
Met with pt to perform day 16 testing. Day 15 scores are noted below:     Yanni Kaiser Cohort 58 Day 1 Day 16 % change   HUMBERTO Depression-21  Anxiety-16  Stress-20 Depression- 21  Anxiety- 15  Stress- 17 No change  7% decrease  15% decrease   VAS  Pain today-8  Pain in the past week-8 Pain today- 7  Pain in the past week- 6 13% improvement  No change   Pain Catastrophizing Scale 50 46 8% decrease   Sleep Quality Instrument  19 10 48% increase   Pain Disability Index 64 56 13% decrease   Fear Avoidance Beliefs Fear of Physical pain -24  Fear of Pain caused by work/chores- 31  Total fear of pain- 72 Fear of Physical pain-22  Fear of Pain caused by work/chores- 27  Total fear of pain- 66 8% decrease  13% decrease  9% decrease overall   Cyn Pain questionnaire 35 40 Increase    Low Back Disability  66% 50% 25% decrease   ACE score N/A N/A            Discussed progress in program, anticipated obstacles and f/u plan.  Pt agrees to f/u plan and will reach out to FRP with any questions or concerns.

## 2022-05-19 ENCOUNTER — CLINICAL SUPPORT (OUTPATIENT)
Dept: REHABILITATION | Facility: OTHER | Age: 52
End: 2022-05-19
Payer: MEDICARE

## 2022-05-19 ENCOUNTER — CLINICAL SUPPORT (OUTPATIENT)
Dept: PAIN MEDICINE | Facility: OTHER | Age: 52
End: 2022-05-19
Payer: MEDICARE

## 2022-05-19 DIAGNOSIS — G89.29 CHRONIC LEFT-SIDED LOW BACK PAIN WITH SCIATICA, SCIATICA LATERALITY UNSPECIFIED: ICD-10-CM

## 2022-05-19 DIAGNOSIS — Z74.09 IMPAIRED FUNCTIONAL MOBILITY, BALANCE, GAIT, AND ENDURANCE: Primary | ICD-10-CM

## 2022-05-19 DIAGNOSIS — F33.42 RECURRENT MAJOR DEPRESSIVE DISORDER, IN FULL REMISSION: ICD-10-CM

## 2022-05-19 DIAGNOSIS — G89.4 CHRONIC PAIN DISORDER: ICD-10-CM

## 2022-05-19 DIAGNOSIS — F40.298 FEAR OF PAIN: ICD-10-CM

## 2022-05-19 DIAGNOSIS — Z74.09 IMPAIRED FUNCTIONAL MOBILITY AND ACTIVITY TOLERANCE: ICD-10-CM

## 2022-05-19 DIAGNOSIS — G89.4 CHRONIC PAIN DISORDER: Primary | ICD-10-CM

## 2022-05-19 DIAGNOSIS — M54.40 CHRONIC LEFT-SIDED LOW BACK PAIN WITH SCIATICA, SCIATICA LATERALITY UNSPECIFIED: ICD-10-CM

## 2022-05-19 DIAGNOSIS — Z78.9 DECREASED ACTIVITIES OF DAILY LIVING (ADL): ICD-10-CM

## 2022-05-19 DIAGNOSIS — Z78.9 ALTERATION IN PERFORMANCE OF ACTIVITIES OF DAILY LIVING: Primary | ICD-10-CM

## 2022-05-19 PROCEDURE — 97530 THERAPEUTIC ACTIVITIES: CPT

## 2022-05-19 PROCEDURE — 99366 PR TEAM CONFERENCE FACE-TO-FACE NONPHYSICIAN: ICD-10-PCS | Mod: ,,, | Performed by: NURSE PRACTITIONER

## 2022-05-19 PROCEDURE — 97110 THERAPEUTIC EXERCISES: CPT

## 2022-05-19 PROCEDURE — 99366 TEAM CONF W/PAT BY HC PROF: CPT | Mod: ,,, | Performed by: NURSE PRACTITIONER

## 2022-05-19 NOTE — PROGRESS NOTES
[facility]    Functional Restoration Program  Physical Therapy Treatment Note  FRP Day 15    Name: Yanni Kaiser  MRN:[id number]      Therapy Diagnosis:   Encounter Diagnoses   Name Primary?    Impaired functional mobility, balance, gait, and endurance Yes    Impaired functional mobility and activity tolerance     Decreased activities of daily living (ADL)     Chronic left-sided low back pain with sciatica, sciatica laterality unspecified     Recurrent major depressive disorder, in full remission     Chronic pain disorder      Physician: Snow Collazo NP    Date: 5/19/2022  Physician Orders: PT Eval and Treat   Medical Diagnosis from Referral:   G89.4 (ICD-10-CM) - Chronic pain disorder   Z78.9 (ICD-10-CM) - Decreased activities of daily living (ADL)   Z74.09 (ICD-10-CM) - Impaired functional mobility and activity tolerance         Evaluation Date: 3/15/2022  Authorization Period Expiration: 12/31/2022  Plan of Care Expiration: 6/10/2022  Visit # / Visits authorized: 13 / 20  FOTO: 2/ 3      Precautions: Standard, Diabetes and Fall     Time In:  1:45 pm  Time Out: 3:00 pm  Total Appointment Time (timed & untimed codes): 75 minutes        SUBJECTIVE     Yanni reports she is breathing much better as her asthma is under better control. Her low back pain is also slightly less. She feels confident in her ability to continue w/ daily exercise after the program is over, but does have some concerns about her family being a barrier. Her  is not very supportive & very much does not like her doing anything on her own.      Patient's FRP goals: improve her function so she can enjoy life again      Current 5/10  Location(s): B low back, L knee       OBJECTIVE     Objective Measures updated at progress report unless specified.       Limitation/Restriction for FOTO lumbar Survey     Therapist reviewed FOTO scores for Yanni Kaiser on 3/15/2022.   FOTO documents  entered into EPIC - see Media section.     Limitation Score Evaluation: 49%  04/20/22: 71%   5/5/2022: 42%      Predicted Score: 44%             Treatment       Yanni received the Individualized Treatments listed below:     therapeutic exercises to develop strength, endurance, ROM, flexibility, posture and core stabilization for 75 minutes including:         Peripheral muscle strengthening which included 1-2 sets of 10-20 repetitions at a slow, controlled 5-7 second per rep pace focused on strengthening supporting musculature for improved body mechanics & functional mobility.  Patient & therapist focused on proper form during treatment to ensure optimal strengthening of each targeted muscle group. Patients are instructed to keep sets/reps with proper load to stay at 3-5 out of 10 on the provided modified Froylan exertion scale.       BATCA Machine Exercises Weight (lbs) Sets Repetitions Froylan Exertion Scale Rating   Leg Extension  7.5 1 20 4   Hamstring Curls 22.5 1 20 4   Chest Press 25 1 20 4   Pec Fly 25 1 20 4   Lat Pull Down 30 1 20 4   Mid Row 30 1 20 3   Bicep Bar Curls 10 1 20 3   Standing Tricep Extension 20 1 20 4   Single Leg Press  R/L          · Seated lumbar rotations w/ bar in B UE across upper back 2x10 B  · Seated lumbar side bends w/ bar in B UE across upper back 2x10 B  · Seated lumbar slouch-correction 2x10 B  · Fitter board hard setting tilts each way 2x20 w/ palmar support    Patient Education and Home Exercises     Home Exercises Provided and Patient Education Provided     Education provided: none    Written Home Exercises Provided: Patient instructed to cont prior HEP. Exercises were reviewed and Yanni was able to demonstrate them prior to the end of the session.  Yanni demonstrated fair  understanding of the education provided. See EMR under Patient Instructions for information provided during therapy sessions    ASSESSMENT     Pt w/ improved participation in therapy today tolerating  increased volume of exercises in her session. She responded better to seated lumbar mobility rather than supine w/ report of less pain afterwards, but needed mild cuing to work w/in more pain free range. She is to go on hold for 3+ weeks to attempt independence w/ daily activities to help improve her function & return to [facility] for assessment.    Yanni Is progressing well towards her goals.   Pt prognosis is Fair.     Pt will continue to benefit from skilled outpatient physical therapy to address the deficits listed in the problem list box on initial evaluation, provide pt/family education and to maximize pt's level of independence in the home and community environment.     Pt's spiritual, cultural and educational needs considered and pt agreeable to plan of care and goals.     Anticipated barriers to physical therapy: chronic nature of issues, high BMI    GOALS: Pt is in agreement with the following goals:        Goal Progress Towards Goal   Short Term: In 3 weeks, pt will:     Verbalize & demonstrate good understanding of resisted training with 3-1-3 second rep for niygpnvfmj-guvwwtkxm-afhwcebjl contraction to encourage full muscle recruitment for strength & endurance Progress towards goal: MET 05/18/22   Verbalize & demonstrate good understanding of home stretch routine to improve AROM, help decrease pain & improve mobility Progress towards goal: PROGRESSING 05/05/22   Demonstrate proper supine or seated diaphragmatic breathing with decreased chest excursion & emphasis on full abdominal excursion & increased expiration time to promote muscle relaxation & increased parasympathetic activity to improve patient tolerance to activities Goal met 5/2/2022   Demonstrate proper static standing posture in frontal & sagittal plane per PT visual assessment to decrease postural strain in ADLs Progress towards goal: PROGRESSING 05/05/22   Demonstrate good recall of names of resisted exercises w/ minimal to moderate verbal  cues to promote independence w/ exercise at the end of the program Progress towards goal: MET 05/18/22   Verbalize plan for continuing resisted exercises w/ specific location (i.e. commercial gym, home, community fitness center) indicating preference for machine-based or free-weight exercises to incorporate all major muscle groups to maintain & progress therapeutic functional improvements Progress towards goal: PROGRESSING 05/05/22         Long Term: In 8 weeks, pt will:     Verbalize good understanding of activities planning/scheduling (stretching, mindfulness, resisted training, & cardio) prescribed by PT/OT following the end of the program to sustain & progress functional improvements Progress towards goal: PROGRESSING 05/05/22   Be independent w/ selected resisted training w/ minimal to no cuing while performing to continue w/ resisted strengthening independently at the end of the program Progress towards goal: PROGRESSING 05/05/22   Improve 2 Minute Walk Test (MWT) to 400 feet and 6 MWT to 1200 feet or greater to improve gait speed and mobility Progress towards goal: MET 05/05/22       Perform single leg stance 10 seconds or greater bilaterally to improve safety and balance in ADLs Progress towards goal: MET 05/05/22       Demonstrate Timed Up & Go (with appropriate assistive device, if necessary) of 10 seconds or less to decrease fall risk and improve functional mobility Progress towards goal: MET 05/05/22       Demonstrate 5 time sit to stand test without upper extremity assist to 15 sec or less to improve general mobility and decrease fall risk Progress towards goal: MET 05/05/22       Score 44 % or less on Lumbar FOTO to indicate significant functional improvements in impaired functions secondary to low back pain Progress towards goal: MET 05/05/22   TO BE RETESTED          PLAN     Pt on hold for 3+ weeks to attempt independence w/ exercise to maintain her progress    Ugo Tim, PT, DPT

## 2022-05-19 NOTE — PROGRESS NOTES
The team met with Yanni Kaiser regarding her progress in the program and the plans moving forward.  Please refer to individual notes for plans of care.      Says I am drinking my water, stretching, practicing mindfulness and breathing, have changed my diet.    Yanni Kaiser completed has completed the Functional Restoration Program. The table below notes Measures Outcomes comparing Day 1 vs. Day 16 of the Program:  Yanni Kaiser Cohort 56 Day 1 Day 16 % change   HUMBERTO Depression-21  Anxiety-16  Stress-20 Depression- 21  Anxiety- 15  Stress- 17 No change  7% decrease  15% decrease   VAS  Pain today-8  Pain in the past week-8 Pain today- 7  Pain in the past week- 6 13% improvement  No change   Pain Catastrophizing Scale 50 46 8% decrease   Sleep Quality Instrument  19 10 48% increase   Pain Disability Index 64 56 13% decrease   Fear Avoidance Beliefs Fear of Physical pain -24  Fear of Pain caused by work/chores- 31  Total fear of pain- 72 Fear of Physical pain-22  Fear of Pain caused by work/chores- 27  Total fear of pain- 66 8% decrease  13% decrease  9% decrease overall   Cyn Pain questionnaire 35 40 Increase    Low Back Disability  66% 50% 25% decrease   ACE score N/A N/A            This is amazing progress in just 5 weeks!    We discussed that this progress is the result of taking ownership and accountability of your chronic pain and overall health and of the hard work and effort that was put forth during the program.     We discussed staying motivated and using the new strategies learned in the program.    Yanni Kaiser will return for FRP Graduation ceremony with the group and all FRP Team members following individual meetings.     We will continue to monitor her progress in our year-long follow-up program at the following intervals:  1 month with PT  2 months with OT  3 months with NP  6 months with NP  9 months with LCSW  12 months with NP

## 2022-05-19 NOTE — PROGRESS NOTES
"[facility]   Functional Restoration Program  Occupational Therapy Daily Note  FRP Day 15    Name: Yanni Kaiser  Medical Record Number: [id number]  Visit date: 5/19/2022    Therapy Diagnosis:   Encounter Diagnoses   Name Primary?    Alteration in performance of activities of daily living Yes    Fear of pain      Physician: Snow Collazo NP  Physician Orders: OT Eval and Treat  Medical Diagnosis: Chronic pain disorder  Evaluation Date: 3/15/2022  Insurance Authorization Period Expiration: 12/31/2022  Plan of Care Certification Period: 5/27/2022  Visit # / Visits authorized: 12 / 20  FOTO: evaluation: 60% limitation    Time In: 12:30 PM  Time Out: 1:45 PM  Total Billable Time: 75 minutes    Subjective     Yanni reports "im feeling better today but I had to take a pain pill and a muscle relaxer last night so I dont know if that's still in effect or not".        Pain: 5 / 10  Location: middle low back     Personal Functional Three Month Goals: Performance and satisfaction noted below.    OBJECTIVE      Individualized Treatment: Increased resistance levels of functional conditioning exercises according to personal recovery goals. Activities include: Dynamic reaching, [id number] lifting, sustained standing activity, maximal lifting, 2-handed carry, sustained seated tasks, push and pull, waist-to-shoulder lift, box/container carry, endurance training. Daily functional conditioning progress is documented on a flow sheet which is available upon request.      Yanni participated in dynamic, functional therapeutic activities/ lifting/endurance activities in order to increase pt's participation with vocational, selfcare ADLs, and leisure activities in order to improve quality of life for 75 minutes, including:     Full body stretch w/ 3-10 sec hold technique &/or 3-5 repetition technique on all of the following:   Cervical flx/ext   Cervical sidebending   Cervical " rotation   Cervical protraction/retraction   Shld rolls   Shld depression/elevation   Scapular retraction/protraction/scaption   Posterior shld stretch (cross arm)   Shld ER stretch (chicken wing)   Elbow flx/ext   Forearm supination/pronation   Wrist flx/ext   Open/close hands/fingers   Seated trunk rotations   Seated trunk/thoracic ext/flx   Seated marches & knee to chest   Seated knee flx/ext   Seated hip ER/piriformis stretch   Seated hamstring stretch   Seated toe raise to heel raise    Seated ankle circles each way   Standing lumbar extensions   Standing lateral leaning stretch   Standing quad stretch (UE support for balance)    Yanni completed functional lifting, floor to waist, 3x5 reps x 10 lbs with exertion rated sort of hard (4/10); focused education on pacing during task performance. Took standing rest break midway to focus on deep breathing. Pt plans to use this lift related to picking up items at the house. With SOB post activity.     Yanni participated in functional lift waist to shoulder, 3x5 reps x 15 lbs with a rating of sort of hard (4/10) exertion. Yanni re-educated on standing posture, core awareness, keeping shoulders depressed and retracted. Increased fatigue throughout activity but with good motivation and use of standing stretches as needed.     Pt completed seated mindfulness diaphragmatic breathing activity [id number] with focus on posture, mechanics of breathing, and benefits re: PNS activation. Pt voiced and demonstrated understanding.     Yanni completed maximal lift 2x5 reps with 15 lbs, rated as sort of hard (4/10) exertion, continued education focused on neutral spine positioning and pushing through heels for safety and activation of correct muscles.      Yanni completed dynamic reaching in various planes of movement, with continued focus on core awareness, standing posture, hip rotation/weight shifting, slow and controlled moving, pacing as needed.  Completed 3x5 reps x 2 lbs, rated as sort of hard (4/10) exertion.      Pt completed sustained standing activity to discuss current habits/routines re: daily schedule pre program, and set goals related to intentional movement, water intake, nutrition, stretching and mindfulness in order to create new daily schedule to include those goals. Pt used fine motor and dexterity skills to handwrite old and new schedules. Continued education on proper ergonomics (posture, neutral spine, 90 degrees for elbows on table, etc), weight shifting as needed and standing stretches as needed. Pt rated activity moderate (3/10) exertion.  During activity pt discussed anticipated barriers, anxieties and stressors re: new schedule and self accountability. Pt with good mindset and plan moving forward and with decreased stress and increased self-efficacy post activity.     Pt completed seated mindfulness diaphragmatic breathing activity [id number] with focus on posture, mechanics of breathing, and benefits re: PNS activation. Pt voiced and demonstrated understanding.     Pt educated on and participated in seated yoga flow x10 min with focus on stability, strength, balance, sitting posture, alignment, and coordinating breath with movement. Pt educated on both physical and mental benefits of yoga.      Patient Education and Home Exercises     Education provided:   - keke scale, pain cycle, z-lie, functional lifting mechanics, pursed lip and diaphragmatic breathing techniques  - 4-7-8 breathing technique.    Written Home Exercises Provided: Patient instructed to cont prior HEP. Exercises were reviewed and Yanni was able to demonstrate them prior to the end of the session.  Lyndseye demonstrated good  understanding of the education provided. See EMR under Patient Instructions for information provided during therapy sessions.    ASSESSMENT     Efrainmichelleivan presents with improved energy levels and ease of breathing but continued difficulty with  asthma and post nasal drip. Despite this, she had good tolerance to session this date. Good response to scheduling activity and with good continued response to breathing and yoga activity. Good plan to continue progress made in FRP independently, with good plan for anticipated barriers.  Overall, continues to progress towards personal goals.     Yanni Is progressing well towards her goals. Pt prognosis is Excellent.     Pt will continue to benefit from skilled outpatient occupational therapy to address the deficits listed in the problem list box on initial evaluation, provide pt/family education and to maximize pt's level of independence in the home and community environment.      Pt's spiritual, cultural and educational needs considered and pt agreeable to plan of care and goals.     FRP Goals: While working towards the long-term (3 month) functional goals listed above, Bobby accomplish the following short term goals during the 5-week intensive rehabilitation program. Yanni will:     1. Develop a viable return to community participation plan. ------------progressing 5/5/22  2. Demonstrate physical capacities consistent with a medium work demand level. ------------progressing 5/5/22  3. Demonstrate increased functional strength by lifting 20 lbs floor to waist and 20 lbs waist to shoulder in order to meet demand of work/home routine. ------------MET 5/5/22  4. Increase her positional tolerance to the level needed for work and home activities. ------------progressing 5/5/22  5. Implement self-care strategies during the program and at home to control pain. ------------progressing 5/5/22  6.   Patient will demonstrate use of mindfulness, stress management, and coping  strategies for improved participation in daily routine. ------------progressing 5/5/22     UPDATED GOALS 5/5/22:  1.Demonstrate increased functional strength by lifting 30 lbs floor to waist and 25 lbs waist to shoulder in order to meet  demand of work/home routine      Specific patient expressed goals and demand levels:  Current Capacity Limit/ Physical  Demand Level (PDL)    Demand Levels of Functional Recovery Goals     Performance/Satisfaction  Day 1 Performance/Satisfaction  Day 10 Performance/Satisfaction  Follow-up      Light Physical Demand  (Based above tested results)     Vocational:  Attend /participate in [employer]     Recreational:  Walking      Increased socialization     Daily Living:  Cooking      Cleaning         Medium Physical Demand Level      1 / 1 1 / 0 1 / 0             3 / 1       1 / 1      5 / 2            5 / 5      4 / 3            3 / 3      5 / 5     The PDL listed above is based on the physical performance screening test completed at evaluation. More comprehensive physical testing integrated with clinical findings would be required to derived an accurate work capacity.       PLAN   UPDATED plan of care certification 5/5/2022 to 7/15/2022     Outpatient occupational therapy, 3 x/wks for 2 additional weeks. Following this, pt to go on hold for a minimum of 7 weeks to attempt independence w/ Home Activity Plan (HAP) & PT activities, and will then return for reassessment.    Mira Ryder, OTR/L  5/19/2022

## 2022-05-26 ENCOUNTER — OFFICE VISIT (OUTPATIENT)
Dept: PODIATRY | Facility: CLINIC | Age: 52
End: 2022-05-26
Payer: MEDICARE

## 2022-05-26 ENCOUNTER — TELEPHONE (OUTPATIENT)
Dept: PODIATRY | Facility: CLINIC | Age: 52
End: 2022-05-26
Payer: MEDICARE

## 2022-05-26 ENCOUNTER — SPECIALTY PHARMACY (OUTPATIENT)
Dept: PHARMACY | Facility: CLINIC | Age: 52
End: 2022-05-26
Payer: MEDICARE

## 2022-05-26 VITALS
BODY MASS INDEX: 60.17 KG/M2 | SYSTOLIC BLOOD PRESSURE: 144 MMHG | WEIGHT: 293 LBS | HEART RATE: 73 BPM | DIASTOLIC BLOOD PRESSURE: 95 MMHG

## 2022-05-26 DIAGNOSIS — M25.872 SESAMOIDITIS OF LEFT FOOT: ICD-10-CM

## 2022-05-26 PROCEDURE — 99214 OFFICE O/P EST MOD 30 MIN: CPT | Mod: 25,S$GLB,, | Performed by: PODIATRIST

## 2022-05-26 PROCEDURE — 29540 PR STRAPPING; ANKLE &/OR FOOT: ICD-10-PCS | Mod: LT,S$GLB,, | Performed by: PODIATRIST

## 2022-05-26 PROCEDURE — 3080F PR MOST RECENT DIASTOLIC BLOOD PRESSURE >= 90 MM HG: ICD-10-PCS | Mod: CPTII,S$GLB,, | Performed by: PODIATRIST

## 2022-05-26 PROCEDURE — 3077F PR MOST RECENT SYSTOLIC BLOOD PRESSURE >= 140 MM HG: ICD-10-PCS | Mod: CPTII,S$GLB,, | Performed by: PODIATRIST

## 2022-05-26 PROCEDURE — 99999 PR PBB SHADOW E&M-EST. PATIENT-LVL IV: CPT | Mod: PBBFAC,,, | Performed by: PODIATRIST

## 2022-05-26 PROCEDURE — 99999 PR PBB SHADOW E&M-EST. PATIENT-LVL IV: ICD-10-PCS | Mod: PBBFAC,,, | Performed by: PODIATRIST

## 2022-05-26 PROCEDURE — 3008F BODY MASS INDEX DOCD: CPT | Mod: CPTII,S$GLB,, | Performed by: PODIATRIST

## 2022-05-26 PROCEDURE — 3052F PR MOST RECENT HEMOGLOBIN A1C LEVEL 8.0 - < 9.0%: ICD-10-PCS | Mod: CPTII,S$GLB,, | Performed by: PODIATRIST

## 2022-05-26 PROCEDURE — 29540 STRAPPING ANKLE &/FOOT: CPT | Mod: LT,S$GLB,, | Performed by: PODIATRIST

## 2022-05-26 PROCEDURE — 99214 PR OFFICE/OUTPT VISIT, EST, LEVL IV, 30-39 MIN: ICD-10-PCS | Mod: 25,S$GLB,, | Performed by: PODIATRIST

## 2022-05-26 PROCEDURE — 3080F DIAST BP >= 90 MM HG: CPT | Mod: CPTII,S$GLB,, | Performed by: PODIATRIST

## 2022-05-26 PROCEDURE — 3052F HG A1C>EQUAL 8.0%<EQUAL 9.0%: CPT | Mod: CPTII,S$GLB,, | Performed by: PODIATRIST

## 2022-05-26 PROCEDURE — 4010F PR ACE/ARB THEARPY RXD/TAKEN: ICD-10-PCS | Mod: CPTII,S$GLB,, | Performed by: PODIATRIST

## 2022-05-26 PROCEDURE — 3008F PR BODY MASS INDEX (BMI) DOCUMENTED: ICD-10-PCS | Mod: CPTII,S$GLB,, | Performed by: PODIATRIST

## 2022-05-26 PROCEDURE — 4010F ACE/ARB THERAPY RXD/TAKEN: CPT | Mod: CPTII,S$GLB,, | Performed by: PODIATRIST

## 2022-05-26 PROCEDURE — 3077F SYST BP >= 140 MM HG: CPT | Mod: CPTII,S$GLB,, | Performed by: PODIATRIST

## 2022-05-26 RX ORDER — LIDOCAINE HYDROCHLORIDE 20 MG/ML
JELLY TOPICAL
Qty: 30 ML | Refills: 2 | Status: SHIPPED | OUTPATIENT
Start: 2022-05-26 | End: 2022-07-07 | Stop reason: SDUPTHER

## 2022-05-26 NOTE — TELEPHONE ENCOUNTER
Specialty Pharmacy - Refill Coordination    Specialty Medication Orders Linked to Encounter    Flowsheet Row Most Recent Value   Medication #1 allerg xt,D.farinae-D.pteronys (ODACTRA) 12 SQ-HDM Subl (Order#[id number], Rx#[id number])        Confirmed with Mariam Browning LPN to  on 5/30    Attn: Mariam Browning LPN (Allergy Nurse)   [facility]   [address] [place] [address]    Patient restarting treatment in office on 6/7    Refill Questions - Documented Responses    Flowsheet Row Most Recent Value   Patient Availability and HIPAA Verification    Does patient want to proceed with activity? Yes   HIPAA/medical authority confirmed? Yes   Relationship to patient of person spoken to? Self   Refill Screening Questions    Changes to allergies? No   Changes to medications? No   New conditions since last clinic visit? No   Unplanned office visit, urgent care, ED, or hospital admission in the last 4 weeks? No   How does patient/caregiver feel medication is working? --  [patient had a lapse in treatment due to adherence - she is restarting on 6/7]   Financial problems or insurance changes? No   How many doses of your specialty medications were missed in the last 4 weeks? > 5   Would patient like to speak to a pharmacist? No   When does the patient need to receive the medication? 06/07/22   Refill Delivery Questions    How will the patient receive the medication?    When does the patient need to receive the medication? 06/07/22   Shipping Address Prescription   Address in [facility] confirmed and updated if neccessary? Yes   Expected Copay ($) 4   Is the patient able to afford the medication copay? Yes   Payment Method invoice (approval required)  [patient coming to [facility] on 5/30 to pay in person]   Days supply of Refill 30   Supplies needed? No supplies needed   Refill activity completed? Yes   Refill activity plan Refill scheduled   Shipment/Pickup Date: 05/30/22           Current Outpatient Medications   Medication Sig    albuterol (PROVENTIL) 2.5 mg /3 mL (0.083 %) nebulizer solution Take 3 mLs (2.5 mg total) by nebulization every 6 (six) hours as needed for Wheezing. Rescue    albuterol (PROVENTIL/VENTOLIN HFA) 90 mcg/actuation inhaler Inhale 2 puffs into the lungs every 6 (six) hours as needed for Wheezing or Shortness of Breath. Rescue    albuterol-ipratropium (DUO-NEB) 2.5 mg-0.5 mg/3 mL nebulizer solution Take 3 mLs by nebulization every 6 (six) hours as needed for Wheezing. Rescue    alcohol swabs (BD ALCOHOL SWABS) PadM Apply 1 each topically 2 (two) times a day.    allerg xt,D.farinae-D.pteronys (ODACTRA) 12 SQ-HDM Subl Place 1 tablet under the tongue once daily. (Patient not taking: Reported on 5/17/2022)    ammonium lactate 12 % Crea Apply twice daily to affected parts both feet as needed.    azelastine (ASTELIN) 137 mcg (0.1 %) nasal spray 1 spray (137 mcg total) by Nasal route 2 (two) times daily.    blood sugar diagnostic Strp 1 each by Misc.(Non-Drug; Combo Route) route 4 (four) times daily before meals and nightly. ACCU CHEK ELVIA PLUS METER    blood-glucose meter (ACCU-CHEK ELVIA PLUS METER) Misc TEST FOUR TIMES DAILY BEFORE MEALS AND EVERY NIGHT    budesonide-formoterol 160-4.5 mcg (SYMBICORT) 160-4.5 mcg/actuation HFAA Inhale 2 puffs into the lungs every 12 (twelve) hours. Controller    cetirizine (ZYRTEC) 10 MG tablet Take 1 tablet (10 mg total) by mouth daily as needed for Allergies (itching).    diclofenac sodium (VOLTAREN) 1 % Gel Apply 2 g topically 2 (two) times daily.    EPINEPHrine (EPIPEN) 0.3 mg/0.3 mL AtIn Inject 0.3 mLs (0.3 mg total) into the muscle once. for 1 dose    estradioL (ESTRACE) 0.01 % (0.1 mg/gram) vaginal cream Use 1 gram of estrogen cream in vagina nightly x 2 weeks, then twice a week thereafter.    fluticasone propionate (FLONASE) 50 mcg/actuation nasal spray 2 sprays (100 mcg total) by Each Nostril route 2 (two)  times a day.    JARDIANCE 10 mg tablet TAKE 1 TABLET(10 MG) BY MOUTH EVERY DAY    lancets (ACCU-CHEK FASTCLIX LANCET DRUM) Misc 1 Device by Misc.(Non-Drug; Combo Route) route 2 (two) times a day.    lancets (ACCU-CHEK SOFTCLIX LANCETS) Misc 1 Device by Misc.(Non-Drug; Combo Route) route 4 (four) times daily.    levocetirizine (XYZAL) 5 MG tablet Take 1 tablet (5 mg total) by mouth every evening.    lidocaine (LIDODERM) 5 % Place 1 patch onto the skin once daily. Remove & Discard patch within 12 hours or as directed by MD    LIDOcaine HCL 2% (XYLOCAINE) 2 % jelly Apply topically as needed. Apply topically once nightly to affected part of foot/feet.    losartan (COZAAR) 100 MG tablet Take 1 tablet (100 mg total) by mouth once daily.    montelukast (SINGULAIR) 10 mg tablet Take 1 tablet (10 mg total) by mouth every evening.    MOVANTIK 12.5 mg Tab Take 1 tablet by mouth once daily.    nicotine (NICODERM CQ) 21 mg/24 hr Place 1 patch onto the skin once daily.    nicotine, polacrilex, (NICORETTE) 2 mg Gum Take 1 each (2 mg total) by mouth as needed (Take 1 piece as needed.  Maximum of 10 per day.).    nystatin-triamcinolone (MYCOLOG) ointment Apply topically 2 (two) times daily.    ondansetron (ZOFRAN) 8 MG tablet Take 1 tablet (8 mg total) by mouth every 8 (eight) hours as needed for Nausea.    oxyCODONE-acetaminophen (PERCOCET) 7.5-325 mg per tablet Take 1 tablet by mouth 3 (three) times daily as needed for Pain.    OZEMPIC 1 mg/dose (4 mg/3 mL) Inject 1 mg into the skin every 7 days.    pantoprazole (PROTONIX) 40 MG tablet TAKE 1 TABLET(40 MG) BY MOUTH TWICE DAILY    pantoprazole (PROTONIX) 40 MG tablet TAKE 1 TABLET(40 MG) BY MOUTH TWICE DAILY    pregabalin (LYRICA) 50 MG capsule Take 1 capsule (50 mg total) by mouth 3 (three) times daily.    QUEtiapine (SEROQUEL) 25 MG Tab Take 1 tablet (25 mg total) by mouth once daily.    sodium chloride (SALINE NASAL) 0.65 % nasal spray 2 sprays by Nasal route  as needed for Congestion.    topiramate (TOPAMAX) 50 MG tablet Take 2 tablets (100 mg total) by mouth every evening.    venlafaxine (EFFEXOR-XR) 75 MG 24 hr capsule Take 1 capsule (75 mg total) by mouth once daily. NO FURTHER REFILL WITHOUT APT   Last reviewed on 5/26/2022  8:27 AM by Lacy Park MA    Review of patient's allergies indicates:   Allergen Reactions    Gabapentin Other (See Comments)     Makes her twitch    Last reviewed on  5/26/2022 8:26 AM by Lacy Park      Tasks added this encounter   6/30/2022 - Refill Call (Auto Added)   Tasks due within next 3 months   6/12/2022 - Clinical - Follow Up Assesement (180 day)     Les Bone, PharmD  [facility]  [address] [place], [place] [address]ve  [facility] [address]  Phone: [phone]  Fax: [phone]

## 2022-05-30 ENCOUNTER — OFFICE VISIT (OUTPATIENT)
Dept: OPTOMETRY | Facility: CLINIC | Age: 52
End: 2022-05-30
Payer: MEDICARE

## 2022-05-30 ENCOUNTER — TELEPHONE (OUTPATIENT)
Dept: PHARMACY | Facility: CLINIC | Age: 52
End: 2022-05-30
Payer: MEDICARE

## 2022-05-30 DIAGNOSIS — E11.9 DIABETIC EYE EXAM: Primary | ICD-10-CM

## 2022-05-30 DIAGNOSIS — H43.393 VITREOUS FLOATERS OF BOTH EYES: ICD-10-CM

## 2022-05-30 DIAGNOSIS — Z01.00 DIABETIC EYE EXAM: Primary | ICD-10-CM

## 2022-05-30 DIAGNOSIS — H04.123 DRY EYE SYNDROME, BILATERAL: ICD-10-CM

## 2022-05-30 DIAGNOSIS — H52.7 REFRACTIVE ERROR: ICD-10-CM

## 2022-05-30 DIAGNOSIS — G93.2 IDIOPATHIC INTRACRANIAL HYPERTENSION: ICD-10-CM

## 2022-05-30 PROCEDURE — 3052F HG A1C>EQUAL 8.0%<EQUAL 9.0%: CPT | Mod: CPTII,S$GLB,, | Performed by: OPTOMETRIST

## 2022-05-30 PROCEDURE — 1160F RVW MEDS BY RX/DR IN RCRD: CPT | Mod: CPTII,S$GLB,, | Performed by: OPTOMETRIST

## 2022-05-30 PROCEDURE — 1159F PR MEDICATION LIST DOCUMENTED IN MEDICAL RECORD: ICD-10-PCS | Mod: CPTII,S$GLB,, | Performed by: OPTOMETRIST

## 2022-05-30 PROCEDURE — 3052F PR MOST RECENT HEMOGLOBIN A1C LEVEL 8.0 - < 9.0%: ICD-10-PCS | Mod: CPTII,S$GLB,, | Performed by: OPTOMETRIST

## 2022-05-30 PROCEDURE — 92015 PR REFRACTION: ICD-10-PCS | Mod: S$GLB,,, | Performed by: OPTOMETRIST

## 2022-05-30 PROCEDURE — 2023F PR DILATED RETINAL EXAM W/O EVID OF RETINOPATHY: ICD-10-PCS | Mod: CPTII,S$GLB,, | Performed by: OPTOMETRIST

## 2022-05-30 PROCEDURE — 1159F MED LIST DOCD IN RCRD: CPT | Mod: CPTII,S$GLB,, | Performed by: OPTOMETRIST

## 2022-05-30 PROCEDURE — 92015 DETERMINE REFRACTIVE STATE: CPT | Mod: S$GLB,,, | Performed by: OPTOMETRIST

## 2022-05-30 PROCEDURE — 4010F ACE/ARB THERAPY RXD/TAKEN: CPT | Mod: CPTII,S$GLB,, | Performed by: OPTOMETRIST

## 2022-05-30 PROCEDURE — 92014 COMPRE OPH EXAM EST PT 1/>: CPT | Mod: S$GLB,,, | Performed by: OPTOMETRIST

## 2022-05-30 PROCEDURE — 99999 PR PBB SHADOW E&M-EST. PATIENT-LVL III: ICD-10-PCS | Mod: PBBFAC,,, | Performed by: OPTOMETRIST

## 2022-05-30 PROCEDURE — 4010F PR ACE/ARB THEARPY RXD/TAKEN: ICD-10-PCS | Mod: CPTII,S$GLB,, | Performed by: OPTOMETRIST

## 2022-05-30 PROCEDURE — 99999 PR PBB SHADOW E&M-EST. PATIENT-LVL III: CPT | Mod: PBBFAC,,, | Performed by: OPTOMETRIST

## 2022-05-30 PROCEDURE — 92014 PR EYE EXAM, EST PATIENT,COMPREHESV: ICD-10-PCS | Mod: S$GLB,,, | Performed by: OPTOMETRIST

## 2022-05-30 PROCEDURE — 1160F PR REVIEW ALL MEDS BY PRESCRIBER/CLIN PHARMACIST DOCUMENTED: ICD-10-PCS | Mod: CPTII,S$GLB,, | Performed by: OPTOMETRIST

## 2022-05-30 PROCEDURE — 2023F DILAT RTA XM W/O RTNOPTHY: CPT | Mod: CPTII,S$GLB,, | Performed by: OPTOMETRIST

## 2022-05-30 NOTE — PROGRESS NOTES
Subjective:       Patient ID: Yanni Kaiser is a 51 y.o. female      Chief Complaint   Patient presents with    Concerns About Ocular Health    Diabetic Eye Exam     History of Present Illness  Dls: 6/9/21 Dr. Bosch     52 y/o female presents today for diabetic eye exam.   Pt c/o floaters ou x 1 month. Pt co blurry vision at distance and near ou  Pt broke bifocal glasses. Pt using otc readers.     LBS 97 x 1 day     No tearing  + itching   No burning  No pain  + ha's  + floaters  No flashes    Eye meds  Otc allergy gtts ou prn     Hemoglobin A1C       Date                     Value               Ref Range             Status                03/30/2022               8.7 (H)             4.0 - 5.6 %           Final                  08/26/2021               5.6                 4.0 - 5.6 %           Final                  04/14/2021               5.5                 4.0 - 5.6 %           Final                 Assessment/Plan:     1. Diabetic eye exam  [facility] vision    No diabetic retinopathy. Discussed with pt the effects of diabetes on vision, importance of good blood sugar control, compliance with meds, and follow up care with PCP. Return in 1 year for dilated eye exam, sooner PRN.    2. Vitreous floaters of both eyes  Discussed causes of flashes and floaters with the patient and described the warnings of a possible retinal detachment. Advised patient to call if there is an increase in flashes or floaters.    3. Idiopathic intracranial hypertension  Since 2002 per pt report. Stable findings compared to previous exam. Previously seen by neuro-optho. Pt under care of Dr. Marte (neurology).     4. Dry eye syndrome, bilateral  AT BID - QID OU.     5. Refractive error  Educated patient on refractive error and discussed lens options. Dispensed updated spectacle Rx. Educated about adaptation period to new specs.    Eyeglass Final Rx     Eyeglass Final Rx       Sphere Cylinder Add    Right +0.75 Sphere +2.00    Left -0.25  Sphere +2.00    Expiration Date: 5/30/2023                  Follow up in about 1 year (around 5/30/2023).

## 2022-05-31 ENCOUNTER — TELEPHONE (OUTPATIENT)
Dept: ADMINISTRATIVE | Facility: CLINIC | Age: 52
End: 2022-05-31
Payer: MEDICARE

## 2022-06-01 ENCOUNTER — PATIENT MESSAGE (OUTPATIENT)
Dept: ADMINISTRATIVE | Facility: HOSPITAL | Age: 52
End: 2022-06-01
Payer: MEDICARE

## 2022-06-02 ENCOUNTER — OFFICE VISIT (OUTPATIENT)
Dept: DERMATOLOGY | Facility: CLINIC | Age: 52
End: 2022-06-02
Payer: MEDICARE

## 2022-06-02 ENCOUNTER — PES CALL (OUTPATIENT)
Dept: ADMINISTRATIVE | Facility: CLINIC | Age: 52
End: 2022-06-02
Payer: MEDICARE

## 2022-06-02 DIAGNOSIS — L40.9 SCALP PSORIASIS: ICD-10-CM

## 2022-06-02 DIAGNOSIS — L40.9 PSORIASIS: Primary | ICD-10-CM

## 2022-06-02 DIAGNOSIS — L81.9 HYPERPIGMENTATION: ICD-10-CM

## 2022-06-02 DIAGNOSIS — Z76.89 ENCOUNTER FOR SKIN CARE: ICD-10-CM

## 2022-06-02 PROCEDURE — 99204 PR OFFICE/OUTPT VISIT, NEW, LEVL IV, 45-59 MIN: ICD-10-PCS | Mod: S$GLB,,, | Performed by: DERMATOLOGY

## 2022-06-02 PROCEDURE — 1160F RVW MEDS BY RX/DR IN RCRD: CPT | Mod: CPTII,S$GLB,, | Performed by: DERMATOLOGY

## 2022-06-02 PROCEDURE — 1160F PR REVIEW ALL MEDS BY PRESCRIBER/CLIN PHARMACIST DOCUMENTED: ICD-10-PCS | Mod: CPTII,S$GLB,, | Performed by: DERMATOLOGY

## 2022-06-02 PROCEDURE — 1159F PR MEDICATION LIST DOCUMENTED IN MEDICAL RECORD: ICD-10-PCS | Mod: CPTII,S$GLB,, | Performed by: DERMATOLOGY

## 2022-06-02 PROCEDURE — 99999 PR PBB SHADOW E&M-EST. PATIENT-LVL V: CPT | Mod: PBBFAC,,, | Performed by: DERMATOLOGY

## 2022-06-02 PROCEDURE — 99999 PR PBB SHADOW E&M-EST. PATIENT-LVL V: ICD-10-PCS | Mod: PBBFAC,,, | Performed by: DERMATOLOGY

## 2022-06-02 PROCEDURE — 99204 OFFICE O/P NEW MOD 45 MIN: CPT | Mod: S$GLB,,, | Performed by: DERMATOLOGY

## 2022-06-02 PROCEDURE — 3052F PR MOST RECENT HEMOGLOBIN A1C LEVEL 8.0 - < 9.0%: ICD-10-PCS | Mod: CPTII,S$GLB,, | Performed by: DERMATOLOGY

## 2022-06-02 PROCEDURE — 4010F PR ACE/ARB THEARPY RXD/TAKEN: ICD-10-PCS | Mod: CPTII,S$GLB,, | Performed by: DERMATOLOGY

## 2022-06-02 PROCEDURE — 1159F MED LIST DOCD IN RCRD: CPT | Mod: CPTII,S$GLB,, | Performed by: DERMATOLOGY

## 2022-06-02 PROCEDURE — 4010F ACE/ARB THERAPY RXD/TAKEN: CPT | Mod: CPTII,S$GLB,, | Performed by: DERMATOLOGY

## 2022-06-02 PROCEDURE — 3052F HG A1C>EQUAL 8.0%<EQUAL 9.0%: CPT | Mod: CPTII,S$GLB,, | Performed by: DERMATOLOGY

## 2022-06-02 RX ORDER — TRIAMCINOLONE ACETONIDE 1 MG/G
CREAM TOPICAL 2 TIMES DAILY
Qty: 45 G | Refills: 0 | Status: SHIPPED | OUTPATIENT
Start: 2022-06-02

## 2022-06-02 RX ORDER — CLOBETASOL PROPIONATE 0.46 MG/ML
SOLUTION TOPICAL 2 TIMES DAILY
Qty: 60 ML | Refills: 1 | Status: SHIPPED | OUTPATIENT
Start: 2022-06-02

## 2022-06-02 NOTE — PROGRESS NOTES
Subjective:       Patient ID:  Yanni Kaiser is a 51 y.o. female who presents for   Chief Complaint   Patient presents with    Spot     Forehead and BL leg, x few months, itches and hyperpigmentation, tx antibacterial cream       Spot - Initial  Affected locations: forehead, left lower leg and right lower leg  Signs / symptoms: itching  Severity: mild  Timing: constant  Aggravated by: nothing  Relieving factors/Treatments tried: OTC antibiotic cream  Improvement on treatment: no relief        Review of Systems   Constitutional: Negative for fever and chills.   HENT: Negative for sore throat.    Respiratory: Negative for cough.    Skin: Positive for itching, rash and dry skin.        Objective:    Physical Exam   Constitutional: She appears well-developed and well-nourished.   Eyes: No conjunctival no injection.   Neurological: She is alert and oriented to person, place, and time.   Psychiatric: She has a normal mood and affect.   Skin:                      Diagram Legend     Erythematous scaling macule/papule c/w actinic keratosis       Vascular papule c/w angioma      Pigmented verrucoid papule/plaque c/w seborrheic keratosis      Yellow umbilicated papule c/w sebaceous hyperplasia      Irregularly shaped tan macule c/w lentigo     1-2 mm smooth white papules consistent with Milia      Movable subcutaneous cyst with punctum c/w epidermal inclusion cyst      Subcutaneous movable cyst c/w pilar cyst      Firm pink to brown papule c/w dermatofibroma      Pedunculated fleshy papule(s) c/w skin tag(s)      Evenly pigmented macule c/w junctional nevus     Mildly variegated pigmented, slightly irregular-bordered macule c/w mildly atypical nevus      Flesh colored to evenly pigmented papule c/w intradermal nevus       Pink pearly papule/plaque c/w basal cell carcinoma      Erythematous hyperkeratotic cursted plaque c/w SCC      Surgical scar with no sign of skin cancer recurrence      Open and closed comedones       Inflammatory papules and pustules      Verrucoid papule consistent consistent with wart     Erythematous eczematous patches and plaques     Dystrophic onycholytic nail with subungual debris c/w onychomycosis     Umbilicated papule    Erythematous-base heme-crusted tan verrucoid plaque consistent with inflamed seborrheic keratosis     Erythematous Silvery Scaling Plaque c/w Psoriasis     See annotation                  Assessment / Plan:        Psoriasis  -     triamcinolone acetonide 0.1% (KENALOG) 0.1 % cream; Apply topically 2 (two) times daily. Prn focally for rash spots on forehead and legs.Stop using steroid topical when skin is smooth and non itchy.  Do not treat dark or red coloring.  Dispense: 45 g; Refill: 0  Discussed all of the following:  Psoriasis is an inflammatory condition that affects the skin and nails. You may have patches of thick, red skin (plaques) covered with silvery scales. These often appear on the elbows, knees, legs, lower back, and scalp.  The plaques itch and can be painful. People with this condition are more likely to have emotional stress and depression.  Psoriasis is not contagious. It cant spread to someone else who touches it. But it can be inherited. It is an autoimmune skin disease. This means that the immune system has an abnormal reaction. It treats healthy skin like it is a foreign substance. This causes skin cells to grow faster than normal and to stack up in raised red patches. Psoriasis is a long-term (chronic) disease. You will have flare-ups that come and go over time.  Psoriasis is made worse by smoking, alcohol, stress, and some blood pressure medications.  Brochure given for patient education.  Focal case.  Chronic nature of this condition discussed with patient.  Reviewed with patient different treatment options and associated risks.  Proper application of medications and or care for affected area(s) and condition(s) reviewed.  Discussed with patient the risks  of topical and injectable steroids, including, but not limited, to atrophy, rosacea, acne, glaucoma, cataracts, adrenal suppression, striae.  Do not touch eyes with medication.  [facility] and or records and notes reviewed and considered for their impact on our clinical decision making today.    Encounter for skin care  No hot water bathing reviewed.    Scalp psoriasis  -     clobetasoL (TEMOVATE) 0.05 % external solution; Apply topically 2 (two) times daily. Prn scalp itch or rash  Dispense: 60 mL; Refill: 1  Discussed with patient the etiology and pathogenesis of the disease or skin lesion(s) and possible treatments and aggravators.    Reviewed with patient different treatment options and associated risks.  Proper application of medications and or care for affected area(s) and condition(s) reviewed.  Patient to start 2-5% crude coal tar shampoo to be used as scalp soaks for at least 3 minutes, longer if possible, per their regular shampooing schedule.  Can also be applied as directed to other parts of the body.    Hyperpigmentation  Discussed with the patient the risk of color scars, erythema, or hyperpigmentation that could take months to resolve.             Follow up in about 1 year (around 6/2/2023).

## 2022-06-02 NOTE — PATIENT INSTRUCTIONS
Discussed all of the following:  Psoriasis is an inflammatory condition that affects the skin and nails. You may have patches of thick, red skin (plaques) covered with silvery scales. These often appear on the elbows, knees, legs, lower back, and scalp.  The plaques itch and can be painful. People with this condition are more likely to have emotional stress and depression.  Psoriasis is not contagious. It cant spread to someone else who touches it. But it can be inherited. It is an autoimmune skin disease. This means that the immune system has an abnormal reaction. It treats healthy skin like it is a foreign substance. This causes skin cells to grow faster than normal and to stack up in raised red patches. Psoriasis is a long-term (chronic) disease. You will have flare-ups that come and go over time.  Psoriasis is made worse by smoking, alcohol, stress, and some blood pressure medications.     Avoid using hot water  Patient to start 3% crude coal tar shampoo to be used as scalp soaks for at least 15 minutes, longer if possible, per their regular shampooing schedule.  Can also be applied as directed to other parts of the body.

## 2022-06-02 NOTE — PROGRESS NOTES
Subjective:      Patient ID: Yanni Kaiser is a 51 y.o. female.    Chief Complaint: Foot Problem (Feet hurting her left foot under big  toe )  She has a new issue of discomfort to the left great toe joint.  The pain is a knee the great toe joint.  No history of trauma.  She has rested and iced.  She is here for further evaluation.   Review of Systems   Constitutional: Negative for chills, decreased appetite, fever, malaise/fatigue and night sweats.   HENT: Negative for congestion, ear discharge, hearing loss, nosebleeds and tinnitus.    Eyes: Negative for double vision, pain and visual disturbance.   Cardiovascular: Negative for chest pain, claudication, cyanosis and palpitations.   Respiratory: Negative for cough, hemoptysis, shortness of breath and wheezing.    Endocrine: Negative for cold intolerance and heat intolerance.   Hematologic/Lymphatic: Negative for adenopathy and bleeding problem.   Skin: Positive for color change, dry skin, nail changes and unusual hair distribution. Negative for flushing and rash.   Musculoskeletal: Positive for arthritis and stiffness. Negative for joint pain and myalgias.        Pain to both feet   Gastrointestinal: Negative for abdominal pain, dysphagia, nausea and vomiting.   Genitourinary: Negative for dysuria, flank pain, hematuria and pelvic pain.   Neurological: Positive for disturbances in coordination. Negative for loss of balance, numbness, paresthesias and sensory change.   Psychiatric/Behavioral: Negative for altered mental status, hallucinations and suicidal ideas. The patient does not have insomnia.    Allergic/Immunologic: Negative for environmental allergies and persistent infections.           Objective:      Physical Exam  Vitals reviewed.   Constitutional:       Appearance: She is well-developed.   HENT:      Head: Normocephalic and atraumatic.   Cardiovascular:      Pulses:           Dorsalis pedis pulses are 2+ on the right side and 2+ on the left side.         Posterior tibial pulses are 2+ on the right side and 2+ on the left side.   Pulmonary:      Effort: Pulmonary effort is normal.   Musculoskeletal:         General: Normal range of motion.      Right knee: No swelling or ecchymosis.      Left knee: No swelling or ecchymosis.      Right lower leg: No swelling, deformity, tenderness or bony tenderness. No edema.      Left lower leg: No swelling or tenderness. No edema.      Right ankle: No swelling or ecchymosis. No lateral malleolus or medial malleolus tenderness. Normal range of motion. Normal pulse.      Right Achilles Tendon: No defects. Copeland's test negative.      Left ankle: No swelling or ecchymosis. No lateral malleolus or medial malleolus tenderness. Normal range of motion. Normal pulse.      Left Achilles Tendon: Copeland's test negative.      Right foot: Normal range of motion. No deformity.      Left foot: Normal range of motion. No deformity.      Comments: Tenderness noted to the plantar left great toe joint underlying the sesamoid complex.   Feet:      Right foot:      Protective Sensation: 5 sites tested. 5 sites sensed.      Skin integrity: Callus and dry skin present.      Left foot:      Protective Sensation: 5 sites tested. 5 sites sensed.      Skin integrity: Callus and dry skin present.   Skin:     General: Skin is warm and dry.      Capillary Refill: Capillary refill takes more than 3 seconds.      Coloration: Skin is pale.      Findings: No abrasion, bruising, burn or ecchymosis.      Comments: No open lesions noted to b/L lower extremities.     Examination of the skin reveals no evidence of significant rashes, open lesions, suspicious appearing nevi or other concerning lesions.      Neurological:      Mental Status: She is alert and oriented to person, place, and time.      Sensory: Sensory deficit present.      Motor: Atrophy present.      Deep Tendon Reflexes: Reflexes abnormal.      Reflex Scores:       Patellar reflexes are 1+ on  the right side and 1+ on the left side.       Achilles reflexes are 1+ on the right side and 1+ on the left side.     Comments: Tenderness in Tinel sign noted to the left deep peroneal and tarsal tunnel region.  Positive Tinel sign and provocation sign noted.  Light touch intact.  MMT 5/5 DF, PF, Inv, Ev  Normal muscle tone.  No signs of atrophy.  No tremor or spasticity.     Psychiatric:         Attention and Perception: She is attentive.         Speech: Speech normal.         Behavior: Behavior normal.               Assessment:       Encounter Diagnosis   Name Primary?    Sesamoiditis of left foot          Plan:       Yanni was seen today for foot problem.    Diagnoses and all orders for this visit:    Sesamoiditis of left foot    Other orders  -     LIDOcaine HCL 2% (XYLOCAINE) 2 % jelly; Apply topically as needed. Apply topically once nightly to affected part of foot/feet.      I counseled the patient on her conditions, their implications and medical management.        The nature of the condition, options for management, as well as potential risks and complications were discussed in detail with patient. Patient was amenable to my recommendations and left my office fully informed and will follow up as instructed or sooner if necessary.      Under physician supervision, medical staff applied therapeutic strapping: foot low Dye strap and patient is to maintain strapping for 5-7 days unless irritation occurs.    Conservative surgical options discussed in detail.  Appropriate shoe gear and orthotic with metatarsal pad discussed.  Patient declined corticosteroid today.  Follow-up in 6-8 weeks.

## 2022-06-10 ENCOUNTER — PATIENT MESSAGE (OUTPATIENT)
Dept: BARIATRICS | Facility: CLINIC | Age: 52
End: 2022-06-10
Payer: MEDICARE

## 2022-06-14 ENCOUNTER — PATIENT MESSAGE (OUTPATIENT)
Dept: BARIATRICS | Facility: CLINIC | Age: 52
End: 2022-06-14
Payer: MEDICARE

## 2022-06-20 ENCOUNTER — LAB VISIT (OUTPATIENT)
Dept: LAB | Facility: HOSPITAL | Age: 52
End: 2022-06-20
Attending: FAMILY MEDICINE
Payer: MEDICARE

## 2022-06-20 DIAGNOSIS — E11.9 TYPE 2 DIABETES MELLITUS WITHOUT COMPLICATION: ICD-10-CM

## 2022-06-20 LAB
CHOLEST SERPL-MCNC: 206 MG/DL (ref 120–199)
CHOLEST/HDLC SERPL: 3.6 {RATIO} (ref 2–5)
ESTIMATED AVG GLUCOSE: 137 MG/DL (ref 68–131)
HBA1C MFR BLD: 6.4 % (ref 4–5.6)
HDLC SERPL-MCNC: 58 MG/DL (ref 40–75)
HDLC SERPL: 28.2 % (ref 20–50)
LDLC SERPL CALC-MCNC: 126.4 MG/DL (ref 63–159)
NONHDLC SERPL-MCNC: 148 MG/DL
TRIGL SERPL-MCNC: 108 MG/DL (ref 30–150)

## 2022-06-20 PROCEDURE — 36415 COLL VENOUS BLD VENIPUNCTURE: CPT | Mod: PO | Performed by: FAMILY MEDICINE

## 2022-06-20 PROCEDURE — 80061 LIPID PANEL: CPT | Performed by: FAMILY MEDICINE

## 2022-06-20 PROCEDURE — 83036 HEMOGLOBIN GLYCOSYLATED A1C: CPT | Performed by: FAMILY MEDICINE

## 2022-06-20 NOTE — TELEPHONE ENCOUNTER
Patient's appointment on 6/7 was cancelled and rescheduled for 7/5. Pending next refill accordingly.

## 2022-06-24 ENCOUNTER — CLINICAL SUPPORT (OUTPATIENT)
Dept: REHABILITATION | Facility: OTHER | Age: 52
End: 2022-06-24
Payer: MEDICARE

## 2022-06-24 ENCOUNTER — OFFICE VISIT (OUTPATIENT)
Dept: PHYSICAL MEDICINE AND REHAB | Facility: CLINIC | Age: 52
End: 2022-06-24
Payer: MEDICARE

## 2022-06-24 ENCOUNTER — HOSPITAL ENCOUNTER (OUTPATIENT)
Dept: RADIOLOGY | Facility: HOSPITAL | Age: 52
Discharge: HOME | End: 2022-06-24
Attending: PHYSICAL MEDICINE & REHABILITATION
Payer: MEDICARE

## 2022-06-24 ENCOUNTER — TELEPHONE (OUTPATIENT)
Dept: FAMILY MEDICINE | Facility: CLINIC | Age: 52
End: 2022-06-24
Payer: MEDICARE

## 2022-06-24 VITALS
SYSTOLIC BLOOD PRESSURE: 162 MMHG | HEIGHT: 64 IN | DIASTOLIC BLOOD PRESSURE: 93 MMHG | WEIGHT: 293 LBS | BODY MASS INDEX: 50.02 KG/M2 | HEART RATE: 77 BPM

## 2022-06-24 DIAGNOSIS — M79.7 FIBROMYALGIA SYNDROME: Primary | ICD-10-CM

## 2022-06-24 DIAGNOSIS — G89.29 CHRONIC PAIN OF RIGHT KNEE: ICD-10-CM

## 2022-06-24 DIAGNOSIS — M54.42 CHRONIC BILATERAL LOW BACK PAIN WITH BILATERAL SCIATICA: ICD-10-CM

## 2022-06-24 DIAGNOSIS — Z74.09 IMPAIRED FUNCTIONAL MOBILITY AND ACTIVITY TOLERANCE: ICD-10-CM

## 2022-06-24 DIAGNOSIS — M48.061 NEURAL FORAMINAL STENOSIS OF LUMBAR SPINE: ICD-10-CM

## 2022-06-24 DIAGNOSIS — M25.561 CHRONIC PAIN OF RIGHT KNEE: ICD-10-CM

## 2022-06-24 DIAGNOSIS — F33.42 RECURRENT MAJOR DEPRESSIVE DISORDER, IN FULL REMISSION: ICD-10-CM

## 2022-06-24 DIAGNOSIS — Z74.09 IMPAIRED FUNCTIONAL MOBILITY, BALANCE, GAIT, AND ENDURANCE: Primary | ICD-10-CM

## 2022-06-24 DIAGNOSIS — G89.4 CHRONIC PAIN DISORDER: ICD-10-CM

## 2022-06-24 DIAGNOSIS — M48.061 SPINAL STENOSIS OF LUMBAR REGION, UNSPECIFIED WHETHER NEUROGENIC CLAUDICATION PRESENT: ICD-10-CM

## 2022-06-24 DIAGNOSIS — E66.01 MORBID OBESITY WITH BMI OF 60.0-69.9, ADULT: ICD-10-CM

## 2022-06-24 DIAGNOSIS — Z79.891 CHRONICALLY ON OPIATE THERAPY: ICD-10-CM

## 2022-06-24 DIAGNOSIS — M47.26 OSTEOARTHRITIS OF SPINE WITH RADICULOPATHY, LUMBAR REGION: ICD-10-CM

## 2022-06-24 DIAGNOSIS — M54.41 CHRONIC BILATERAL LOW BACK PAIN WITH BILATERAL SCIATICA: ICD-10-CM

## 2022-06-24 DIAGNOSIS — Z78.9 DECREASED ACTIVITIES OF DAILY LIVING (ADL): ICD-10-CM

## 2022-06-24 DIAGNOSIS — G89.29 CHRONIC BILATERAL LOW BACK PAIN WITH BILATERAL SCIATICA: ICD-10-CM

## 2022-06-24 PROCEDURE — 3080F DIAST BP >= 90 MM HG: CPT | Mod: CPTII,S$GLB,, | Performed by: PHYSICAL MEDICINE & REHABILITATION

## 2022-06-24 PROCEDURE — 99214 PR OFFICE/OUTPT VISIT, EST, LEVL IV, 30-39 MIN: ICD-10-PCS | Mod: S$GLB,,, | Performed by: PHYSICAL MEDICINE & REHABILITATION

## 2022-06-24 PROCEDURE — 99999 PR PBB SHADOW E&M-EST. PATIENT-LVL IV: CPT | Mod: PBBFAC,,, | Performed by: PHYSICAL MEDICINE & REHABILITATION

## 2022-06-24 PROCEDURE — 97110 THERAPEUTIC EXERCISES: CPT

## 2022-06-24 PROCEDURE — 3066F PR DOCUMENTATION OF TREATMENT FOR NEPHROPATHY: ICD-10-PCS | Mod: CPTII,S$GLB,, | Performed by: PHYSICAL MEDICINE & REHABILITATION

## 2022-06-24 PROCEDURE — 3008F PR BODY MASS INDEX (BMI) DOCUMENTED: ICD-10-PCS | Mod: CPTII,S$GLB,, | Performed by: PHYSICAL MEDICINE & REHABILITATION

## 2022-06-24 PROCEDURE — 1159F MED LIST DOCD IN RCRD: CPT | Mod: CPTII,S$GLB,, | Performed by: PHYSICAL MEDICINE & REHABILITATION

## 2022-06-24 PROCEDURE — 3044F PR MOST RECENT HEMOGLOBIN A1C LEVEL <7.0%: ICD-10-PCS | Mod: CPTII,S$GLB,, | Performed by: PHYSICAL MEDICINE & REHABILITATION

## 2022-06-24 PROCEDURE — 73560 X-RAY EXAM OF KNEE 1 OR 2: CPT | Mod: 26,50,, | Performed by: RADIOLOGY

## 2022-06-24 PROCEDURE — 4010F ACE/ARB THERAPY RXD/TAKEN: CPT | Mod: CPTII,S$GLB,, | Performed by: PHYSICAL MEDICINE & REHABILITATION

## 2022-06-24 PROCEDURE — 3008F BODY MASS INDEX DOCD: CPT | Mod: CPTII,S$GLB,, | Performed by: PHYSICAL MEDICINE & REHABILITATION

## 2022-06-24 PROCEDURE — 99214 OFFICE O/P EST MOD 30 MIN: CPT | Mod: S$GLB,,, | Performed by: PHYSICAL MEDICINE & REHABILITATION

## 2022-06-24 PROCEDURE — 3077F SYST BP >= 140 MM HG: CPT | Mod: CPTII,S$GLB,, | Performed by: PHYSICAL MEDICINE & REHABILITATION

## 2022-06-24 PROCEDURE — 99999 PR PBB SHADOW E&M-EST. PATIENT-LVL IV: ICD-10-PCS | Mod: PBBFAC,,, | Performed by: PHYSICAL MEDICINE & REHABILITATION

## 2022-06-24 PROCEDURE — 3061F NEG MICROALBUMINURIA REV: CPT | Mod: CPTII,S$GLB,, | Performed by: PHYSICAL MEDICINE & REHABILITATION

## 2022-06-24 PROCEDURE — 4010F PR ACE/ARB THEARPY RXD/TAKEN: ICD-10-PCS | Mod: CPTII,S$GLB,, | Performed by: PHYSICAL MEDICINE & REHABILITATION

## 2022-06-24 PROCEDURE — 3066F NEPHROPATHY DOC TX: CPT | Mod: CPTII,S$GLB,, | Performed by: PHYSICAL MEDICINE & REHABILITATION

## 2022-06-24 PROCEDURE — 1159F PR MEDICATION LIST DOCUMENTED IN MEDICAL RECORD: ICD-10-PCS | Mod: CPTII,S$GLB,, | Performed by: PHYSICAL MEDICINE & REHABILITATION

## 2022-06-24 PROCEDURE — 73560 X-RAY EXAM OF KNEE 1 OR 2: CPT | Mod: TC,50

## 2022-06-24 PROCEDURE — 3061F PR NEG MICROALBUMINURIA RESULT DOCUMENTED/REVIEW: ICD-10-PCS | Mod: CPTII,S$GLB,, | Performed by: PHYSICAL MEDICINE & REHABILITATION

## 2022-06-24 PROCEDURE — 3044F HG A1C LEVEL LT 7.0%: CPT | Mod: CPTII,S$GLB,, | Performed by: PHYSICAL MEDICINE & REHABILITATION

## 2022-06-24 PROCEDURE — 3080F PR MOST RECENT DIASTOLIC BLOOD PRESSURE >= 90 MM HG: ICD-10-PCS | Mod: CPTII,S$GLB,, | Performed by: PHYSICAL MEDICINE & REHABILITATION

## 2022-06-24 PROCEDURE — 73560 XR KNEE AP STANDING WITH BOTH LATERAL: ICD-10-PCS | Mod: 26,50,, | Performed by: RADIOLOGY

## 2022-06-24 PROCEDURE — 3077F PR MOST RECENT SYSTOLIC BLOOD PRESSURE >= 140 MM HG: ICD-10-PCS | Mod: CPTII,S$GLB,, | Performed by: PHYSICAL MEDICINE & REHABILITATION

## 2022-06-24 RX ORDER — DICLOFENAC SODIUM 10 MG/G
2 GEL TOPICAL 3 TIMES DAILY
Qty: 300 G | Refills: 2 | Status: SHIPPED | OUTPATIENT
Start: 2022-06-24

## 2022-06-24 NOTE — PROGRESS NOTES
"    [facility]    Functional Restoration Program  Physical Therapy DISCHARGE Note  FRP Day 16    Name: Yanni Kaiser  MRN:[id number]      Therapy Diagnosis:   Encounter Diagnoses   Name Primary?    Impaired functional mobility, balance, gait, and endurance Yes    Impaired functional mobility and activity tolerance     Decreased activities of daily living (ADL)     Recurrent major depressive disorder, in full remission     Chronic pain disorder      Physician: Snow Collazo NP    Date: 6/24/2022  Physician Orders: PT Eval and Treat   Medical Diagnosis from Referral:   G89.4 (ICD-10-CM) - Chronic pain disorder   Z78.9 (ICD-10-CM) - Decreased activities of daily living (ADL)   Z74.09 (ICD-10-CM) - Impaired functional mobility and activity tolerance         Evaluation Date: 3/15/2022  Authorization Period Expiration: 12/31/2022  Plan of Care Expiration: 6/10/2022  Visit # / Visits authorized: 14 / 20  FOTO: 3/ 3      Precautions: Standard, Diabetes and Fall     Time In:  1:00 pm  Time Out: 2:00 pm  Total Appointment Time (timed & untimed codes): 60 minutes        SUBJECTIVE     Yanni reports no sig sx flare ups since last visit but does plan visit to podiatrist for f/u on plantar fascitis.  Pt feels she has inc her household chore tolerance including inc cooking.  Pt report inc family visits over the summer especially /c children and has found it easier to handle their care.   "I now find I am doing things at my pace"  Pt report she cont 4 x week [employer] visits and has participated in group cleanings including vacuuming.   Pt report daily stretching and finding variations on the internet.  Pt follow recalling times involving her children and grandchildren in stretching from the internet.  Pt states she is finds herself often rolling her shoulders back during sitting for postural correction.  Pt joined gym /c but has only visited one time. Pt report UE resistance training " "and TM leading to familiar muscle soreness.  Pt verbalize understanding that she should visit more often and hopes to bring her family.    Pt report cont difficulty /c sleep quality often waking up from pain in B feet and Sleep   At completion of tx, pt state, "I had to make up my mind that the pain is not going to stop me"       Patient's FRP goals: improve her function so she can enjoy life again      Current 5/10  Location(s): B low back, L knee       OBJECTIVE         Postural examination:  Seated: seated away from back. self correction shoulder rolls     Standing: slight head forward, increased lumbar lordosis     Range of Motion - Cervical    AROM AROM AROM     Evaluation Reassessment  Reassessment   Flexion 34 ° 33° 33°   Extension 40 ° 60° 60°   Side bending Right 32 ° 55° 45°   Side bending Left 30 ° 50° 50°   Rotation Right supine 50 ° Min loss 73°   Rotation Left supine 57 ° Min loss 75°      Range of Motion - Lumbar              ROM Loss ROM Loss ROM Loss   Flexion major loss Mod loss min curve reversal P! /c movement  Mod loss    Extension moderate loss WFL   WFL   Side bending Right moderate loss Min loss  Min loss   Side bending Left moderate loss Min loss  Min loss   Rotation Right moderate loss Min loss  Min loss   Rotation Left minimal loss Min los   Min loss      Strength Testing             Evaluation Reassessment Reassessment   Motion Tested               Lower Extremity R L R L R L   Hip Flexion 4-/5 3/5 4+/5 3+/5 4/5  4/5   Hip IR 3+/5 3/5 3+/5 3+/5 4/5 4/5   Hip ER 4-/5 3+/5 4+/5 4/5  4/5 4/5    Knee Extension 3+/5 3+/5 5/5 5/5 5/5 5/5   Knee Flexion 3+/5 3+/5 5/5 5/5   5/5 5/5      Functional Testing       Evaluation Reassessment  Reassessment   Timed Up and Go 15.4 sec 9.43 sec 9.29 sec (while checking watch for message)   5 Time Sit to Stand w/out UE 23.4 sec 13.54 sec 12.80 sec   Single Limb Stance R LE 3.6 sec 21.26 sec  15.59 sec   Single Limb Stance L LE 6.5 sec 31.64 sec 16.72 sec   2 " Minute Walk Test 282 feet 433.07 ft = 132 m 486 ft   6 Minute Walk Test 797 feet 1320 ft = 402.51 m 1353 ft =    Self Selected Walking Speed 0.66 m/sec 1.12 m/sec m/sec      GAIT:  Assistive Device used: none  Level of Assistance: independent  Patient displays the following gait deviations:  unsteady gait, decreased step length and decreased weight shift.      Minimum standards/norms:    TUG:  < 13.5 seconds/ Males 7.3 sec, Females 8.1 sec  SLS:  26-29 sec  Ages 20-69  Self Selected Walking Speed:  .4-.8m/sec  Limited community ambulator                                                   .8- 1.2  Community ambulator                                                    1.2 m/sec and above  Able to safely cross streets           Limitation/Restriction for FOTO Lumbar Survey     Therapist reviewed FOTO scores for Yanni Kaiser on 5/5/2022.   FOTO documents entered into [employer] - see Media section.     Limitation Score: 49%  FRP Day 1: 71%   FRP Day  9: 42%  06/24/22:      Predicted Score: 44%                   Treatment       Yanni received the Individualized Treatments listed below:     therapeutic exercises to develop strength, endurance, ROM, flexibility, posture and core stabilization for 60 minutes including:     Full body stretch w/ 3-10 sec hold technique &/or 3-5 repetition technique on all of the following:   Cervical flx/ext   Cervical sidebending   Cervical rotation   Cervical protraction/retraction   Shld rolls   Shld depression/elevation   Scapular retraction/protraction/scaption   Posterior shld stretch (cross arm)   Shld ER stretch (chicken wing)   Elbow flx/ext   Forearm supination/pronation   Wrist flx/ext   Open/close hands/fingers   Seated trunk rotations   Seated trunk/thoracic ext/flx   Seated marches & knee to chest   Seated knee flx/ext   Seated hip ER/piriformis stretch   Seated hamstring stretch   Seated toe raise to heel raise    Seated ankle circles each  way   Standing lumbar extensions   Standing lateral leaning stretch   Standing quad stretch (UE support for balance)      PT reassessment (see above)    Supine:   LTR x 10   Prone   KATE 3 min         Patient Education and Home Exercises     Home Exercises Provided and Patient Education Provided     Education provided: none    Written Home Exercises Provided: Patient instructed to cont prior HEP. Exercises were reviewed and Yanni was able to demonstrate them prior to the end of the session.  Yanni demonstrated fair  understanding of the education provided. See EMR under Patient Instructions for information provided during therapy sessions    ASSESSMENT     Pt arrive /c good affect noting she is excited to tell PT about her at home changes.  Although, pt has not begun Ind resistance training in earnest, she is able to verbalize plan and describe how she will adjust her exercises to use unfamiliar machines thereby meeting established goal.  Pt make sig strides /c at home stretches venturing out to use internet as a resource thereby meeting goal for stretch program.  Daily stretching likely contributing to functional activity measures, MMT and ROM testing /c no regression.  Pt demo improve seated posture not slouching to chair back throughout subjective interview and during FOTO administration thereby meeting postural recognition goal.  Present FOTO score not eclipse suggested goal, however, during FRP program pt achieve goal.  Likely pt will cont to improve insight into abilities as she returns to regular resistance exercise since her FOTO reported limitations and subjective limitation involve lifting. Pt is no longer appropriate for skilled interventions and is appropriate for d/c to HEP and Ind workouts to return to her PLOF.     Yanni progressed well towards her goals.     Pt's spiritual, cultural and educational needs considered and pt agreeable to plan of care and goals.      GOALS: Pt is in agreement  with the following goals:        Goal Progress Towards Goal   Short Term: In 3 weeks, pt will:     Verbalize & demonstrate good understanding of resisted training with 3-1-3 second rep for cvmihpuqdw-iptfqlupw-snnlvvkfu contraction to encourage full muscle recruitment for strength & endurance Progress towards goal: MET 05/18/22   Verbalize & demonstrate good understanding of home stretch routine to improve AROM, help decrease pain & improve mobility Progress towards goal: MET 06/24/22   Demonstrate proper supine or seated diaphragmatic breathing with decreased chest excursion & emphasis on full abdominal excursion & increased expiration time to promote muscle relaxation & increased parasympathetic activity to improve patient tolerance to activities Goal met 5/2/2022   Demonstrate proper static standing posture in frontal & sagittal plane per PT visual assessment to decrease postural strain in ADLs Progress towards goal: MET 06/24/22   Demonstrate good recall of names of resisted exercises w/ minimal to moderate verbal cues to promote independence w/ exercise at the end of the program Progress towards goal: MET 05/18/22   Verbalize plan for continuing resisted exercises w/ specific location (i.e. commercial gym, home, community fitness center) indicating preference for machine-based or free-weight exercises to incorporate all major muscle groups to maintain & progress therapeutic functional improvements Progress towards goal: MET 06/24/22         Long Term: In 8 weeks, pt will:     Verbalize good understanding of activities planning/scheduling (stretching, mindfulness, resisted training, & cardio) prescribed by PT/OT following the end of the program to sustain & progress functional improvements Progress towards goal: MET 06/24/22   Be independent w/ selected resisted training w/ minimal to no cuing while performing to continue w/ resisted strengthening independently at the end of the program Progress towards goal:  MET 06/24/22   Improve 2 Minute Walk Test (MWT) to 400 feet and 6 MWT to 1200 feet or greater to improve gait speed and mobility Progress towards goal: MET 05/05/22       Perform single leg stance 10 seconds or greater bilaterally to improve safety and balance in ADLs Progress towards goal: MET 05/05/22       Demonstrate Timed Up & Go (with appropriate assistive device, if necessary) of 10 seconds or less to decrease fall risk and improve functional mobility Progress towards goal: MET 05/05/22       Demonstrate 5 time sit to stand test without upper extremity assist to 15 sec or less to improve general mobility and decrease fall risk Progress towards goal: MET 05/05/22       Score 44 % or less on Lumbar FOTO to indicate significant functional improvements in impaired functions secondary to low back pain Progress towards goal: MET 05/05/22             PLAN     Pt to be discharged to Ind workouts     Ha Mitchell, PT, DPT

## 2022-06-24 NOTE — PROGRESS NOTES
Subjective:       Patient ID: Yanni Kaiser is a 51 y.o. female.      Chief Complaint: No chief complaint on file.    HPI     Mrs. Kaiser is a 51 year old female with  Prediabetes, CKD stage 3, seizure disorder, GERD, COVID-19 illness in 09/2021, fibromyalgia, osteoarthritis and morbid obesity (BMI in the 60s).  She is followed up at the clinic for fibromyalgia and chronic low back pain with bilateral radiculopathy, status post multiple spine injections.  Her last visit to the clinic was on 9/23/2021 (a telemedicine video visit).  She was maintained on venlafaxine , pregabalin (dose was increased), p.r.n. oxycodone/APAP and cyclobenzaprine.      The patient was seen by Dr. Prabhakar from Neurosurgery on 12/20/2021.  Imaging studies were reviewed.  No surgical intervention was recommended.  Weight loss was encouraged and referral to the functional restoration program was done.    The patient is coming to the clinic for follow-up.  Her fibromyalgia symptoms including generalized muscle and joint aching, stiffness, fatigue/poor energy and sleep impairment have been stable.    Her back pain has been stable with occasional flares ups.  It is a constant aching and throbbing pain in the lower lumbar spine and across her back.  She has occasional shooting pain to the left foot with numbness and tingling.  He has less frequent/severe symptoms to the right foot.   Her maximum pain is 10/10 and minimum 7/10.  Today it is 8/10.  She still complains of mild left lower extremity weakness.  She denies bladder or bowel incontinence.      She has been complaining of right knee pain for couple months.  She denies any preceding injuries.  It is a constant sore or sharp sensation, mostly anteriorly.  It is aggravated by prolonged standing and getting out of bed.  Her max pain is 10/10 and minimum 5-6/10.  Today it is 7/10.  She is not sure if she has any swelling.  She denies any warmth.  Review of the chart shows that she had x-ray  arthritis survey 2018 that did not show any significant changes.    She is currently on:  - Venlafaxine XR 75 mg tablets once per day.  - pregabalin 50 mg capsules twice per day   - oxycodone/APAP 10/325 p.o. p.r.n., usually 3 times per day.  - cyclobenzaprine 10 mg p.r.n., usually once per day.  - diclofenac gel topically to painful joints couple times per day      Past Medical History:   Diagnosis Date    Allergy     Anemia     Anxiety     Asthma     Back pain     Chronic bronchitis     Chronic obstructive pulmonary disease, unspecified COPD type 4/1/2022    Cigarette smoker     DDD (degenerative disc disease), lumbar 6/9/2020    Depression     Diabetes mellitus with peripheral circulatory disorder 4/1/2022    Diabetes with neurologic complications     DUB (dysfunctional uterine bleeding) 10/16/2018    GERD (gastroesophageal reflux disease)     High cholesterol     Hyperprolactinemia     Hypertension     Influenza A 01/16/2020    Neuromuscular disorder     Obese body habitus     Obesity     Pseudotumor cerebri     Renal manifestation of secondary diabetes mellitus     Respiratory failure     Seizures     Simple endometrial hyperplasia 2018    Sleep apnea     Smoker     Tobacco dependence     Urinary incontinence        Review of patient's allergies indicates:   Allergen Reactions    Gabapentin Other (See Comments)     Makes her twitch         Review of Systems   Constitutional: Positive for fatigue. Negative for chills and fever.   Eyes: Negative for visual disturbance.   Respiratory: Negative for shortness of breath.    Cardiovascular: Negative for chest pain.   Gastrointestinal: Positive for constipation and nausea. Negative for vomiting.   Genitourinary: Positive for difficulty urinating.   Musculoskeletal: Positive for arthralgias, back pain and neck stiffness. Negative for gait problem, joint swelling and neck pain.   Neurological: Positive for headaches. Negative for dizziness.    Psychiatric/Behavioral: Positive for sleep disturbance. Negative for behavioral problems and dysphoric mood.         Objective:      Physical Exam  Vitals reviewed.   Constitutional:       Appearance: She is well-developed.   HENT:      Head: Normocephalic and atraumatic.   Neck:      Comments: +ve tenderness C-spine.  Musculoskeletal:      Comments: BUE:  ROM:   RUE: full.   LUE: full.  Strength:    RUE: 4/5 at shoulder abduction, 5- elbow flexion, 5- elbow extension, 5- hand .   LUE: 4/5 at shoulder abduction, 5- elbow flexion, 5- elbow extension, 5- hand .  Sensation to pinprick:   RUE: intact.   LUE: intact.      BLE:  ROM:   RLE: full.   LLE: full.  Knee crepitus:   RLE: -ve.   LLE: -ve.   Strength:    RLE: 4/5 at hip flexion, 5- knee extension, 5- ankle DF, 5- PF.   LLE: 4/5 at hip flexion, 5- knee extension, 5- ankle DF, 5- PF.  Sensation to pinprick:     RLE: intact.      LLE: intact.   SLR (sitting):      RLE: +ve.      LLE: +ve.    +ve tenderness over lumbar spine.  +ve diffuse tender points over the upper & lower back, anterior chest wall.    Gait: slow josefina, waddling   Skin:     General: Skin is warm.   Neurological:      Mental Status: She is alert and oriented to person, place, and time.   Psychiatric:         Behavior: Behavior normal.         Assessment:       1. Fibromyalgia syndrome    2. Chronic bilateral low back pain with bilateral sciatica    3. Osteoarthritis of spine with radiculopathy, lumbar region    4. Spinal stenosis of lumbar region, unspecified whether neurogenic claudication present (moderate, L4-5)    5. Neural foraminal stenosis of lumbar spine (moderate, bilateral L4-5)    6. Morbid obesity with BMI of 60.0-69.9, adult    7. Chronically on opiate therapy        Plan:       - X-ray AP Standing Knees with Both Latera; Future  - Continue venlafaxine-XR 75 mg capsules once per day.  - Increase pregabalin (LYRICA) 50 MG capsule; Take 1 capsule (50 mg total) by mouth 3  (three) times daily.  - Continue cyclobenzaprine (FLEXERIL) 10 MG tablet; Take 1 tablet (10 mg total) by mouth 3 (three) times daily as needed for Muscle spasms.  - Continue oxyCODONE-acetaminophen (PERCOCET) 7.5-325 mg per tablet; Take 1 tablet by mouth 3 (three) times daily as needed for Pain.  - Increase diclofenac sodium (VOLTAREN) 1 % Gel; Apply 2 g topically 3 (three) times daily. Apply to painful joints  - Follow up with Dr. Marin [facility] for spine injections as needed.  - Regular home exercise program was encouraged.  - Pain Clinic Drug Screen; Future  - Follow up in about 4 months (around 10/24/2022).     This was a 30 minute visit, 50% of which was spent educating the patient about the diagnosis and the treatment plan.        This note was partly generated with [employer] voice recognition software. I apologize for any possible typographical errors.

## 2022-06-27 ENCOUNTER — OFFICE VISIT (OUTPATIENT)
Dept: FAMILY MEDICINE | Facility: CLINIC | Age: 52
End: 2022-06-27
Payer: MEDICARE

## 2022-06-27 VITALS
WEIGHT: 293 LBS | SYSTOLIC BLOOD PRESSURE: 136 MMHG | RESPIRATION RATE: 18 BRPM | OXYGEN SATURATION: 98 % | HEIGHT: 64 IN | TEMPERATURE: 98 F | HEART RATE: 76 BPM | BODY MASS INDEX: 50.02 KG/M2 | DIASTOLIC BLOOD PRESSURE: 84 MMHG

## 2022-06-27 DIAGNOSIS — B96.89 ACUTE BACTERIAL SINUSITIS: Primary | ICD-10-CM

## 2022-06-27 DIAGNOSIS — M17.0 PRIMARY OSTEOARTHRITIS OF BOTH KNEES: ICD-10-CM

## 2022-06-27 DIAGNOSIS — J01.90 ACUTE BACTERIAL SINUSITIS: Primary | ICD-10-CM

## 2022-06-27 PROCEDURE — 3008F BODY MASS INDEX DOCD: CPT | Mod: CPTII,S$GLB,, | Performed by: FAMILY MEDICINE

## 2022-06-27 PROCEDURE — 3044F PR MOST RECENT HEMOGLOBIN A1C LEVEL <7.0%: ICD-10-PCS | Mod: CPTII,S$GLB,, | Performed by: FAMILY MEDICINE

## 2022-06-27 PROCEDURE — 3061F NEG MICROALBUMINURIA REV: CPT | Mod: CPTII,S$GLB,, | Performed by: FAMILY MEDICINE

## 2022-06-27 PROCEDURE — 4010F PR ACE/ARB THEARPY RXD/TAKEN: ICD-10-PCS | Mod: CPTII,S$GLB,, | Performed by: FAMILY MEDICINE

## 2022-06-27 PROCEDURE — 3077F PR MOST RECENT SYSTOLIC BLOOD PRESSURE >= 140 MM HG: ICD-10-PCS | Mod: CPTII,S$GLB,, | Performed by: FAMILY MEDICINE

## 2022-06-27 PROCEDURE — 3077F SYST BP >= 140 MM HG: CPT | Mod: CPTII,S$GLB,, | Performed by: FAMILY MEDICINE

## 2022-06-27 PROCEDURE — 99999 PR PBB SHADOW E&M-EST. PATIENT-LVL V: CPT | Mod: PBBFAC,,, | Performed by: FAMILY MEDICINE

## 2022-06-27 PROCEDURE — 3080F DIAST BP >= 90 MM HG: CPT | Mod: CPTII,S$GLB,, | Performed by: FAMILY MEDICINE

## 2022-06-27 PROCEDURE — 3061F PR NEG MICROALBUMINURIA RESULT DOCUMENTED/REVIEW: ICD-10-PCS | Mod: CPTII,S$GLB,, | Performed by: FAMILY MEDICINE

## 2022-06-27 PROCEDURE — 3044F HG A1C LEVEL LT 7.0%: CPT | Mod: CPTII,S$GLB,, | Performed by: FAMILY MEDICINE

## 2022-06-27 PROCEDURE — 1160F PR REVIEW ALL MEDS BY PRESCRIBER/CLIN PHARMACIST DOCUMENTED: ICD-10-PCS | Mod: CPTII,S$GLB,, | Performed by: FAMILY MEDICINE

## 2022-06-27 PROCEDURE — 4010F ACE/ARB THERAPY RXD/TAKEN: CPT | Mod: CPTII,S$GLB,, | Performed by: FAMILY MEDICINE

## 2022-06-27 PROCEDURE — 3066F PR DOCUMENTATION OF TREATMENT FOR NEPHROPATHY: ICD-10-PCS | Mod: CPTII,S$GLB,, | Performed by: FAMILY MEDICINE

## 2022-06-27 PROCEDURE — 3066F NEPHROPATHY DOC TX: CPT | Mod: CPTII,S$GLB,, | Performed by: FAMILY MEDICINE

## 2022-06-27 PROCEDURE — 1159F MED LIST DOCD IN RCRD: CPT | Mod: CPTII,S$GLB,, | Performed by: FAMILY MEDICINE

## 2022-06-27 PROCEDURE — 1159F PR MEDICATION LIST DOCUMENTED IN MEDICAL RECORD: ICD-10-PCS | Mod: CPTII,S$GLB,, | Performed by: FAMILY MEDICINE

## 2022-06-27 PROCEDURE — 3008F PR BODY MASS INDEX (BMI) DOCUMENTED: ICD-10-PCS | Mod: CPTII,S$GLB,, | Performed by: FAMILY MEDICINE

## 2022-06-27 PROCEDURE — 3080F PR MOST RECENT DIASTOLIC BLOOD PRESSURE >= 90 MM HG: ICD-10-PCS | Mod: CPTII,S$GLB,, | Performed by: FAMILY MEDICINE

## 2022-06-27 PROCEDURE — 99214 OFFICE O/P EST MOD 30 MIN: CPT | Mod: S$GLB,,, | Performed by: FAMILY MEDICINE

## 2022-06-27 PROCEDURE — 1160F RVW MEDS BY RX/DR IN RCRD: CPT | Mod: CPTII,S$GLB,, | Performed by: FAMILY MEDICINE

## 2022-06-27 PROCEDURE — 99214 PR OFFICE/OUTPT VISIT, EST, LEVL IV, 30-39 MIN: ICD-10-PCS | Mod: S$GLB,,, | Performed by: FAMILY MEDICINE

## 2022-06-27 PROCEDURE — 99999 PR PBB SHADOW E&M-EST. PATIENT-LVL V: ICD-10-PCS | Mod: PBBFAC,,, | Performed by: FAMILY MEDICINE

## 2022-06-27 RX ORDER — DOXYCYCLINE 100 MG/1
100 CAPSULE ORAL EVERY 12 HOURS
Qty: 20 CAPSULE | Refills: 0 | Status: SHIPPED | OUTPATIENT
Start: 2022-06-27 | End: 2022-07-07

## 2022-06-27 RX ORDER — SULINDAC 200 MG/1
200 TABLET ORAL 2 TIMES DAILY
Qty: 30 TABLET | Refills: 0 | Status: SHIPPED | OUTPATIENT
Start: 2022-06-27 | End: 2022-08-16 | Stop reason: SDUPTHER

## 2022-06-27 NOTE — PROGRESS NOTES
Routine Office Visit    Patient Name: Yanni Kaiser    : 1970  MRN: [id number]    Subjective:  Yanni is a 51 y.o. female who presents today for:    1. Sinus pressure  Patient presenting today with sinus pressure and post nasal drip x 3 weeks.  She states that it started when the [place] dust was in the city.  She says it set off her asthma.  No fevers, chills, or sweats.  No loss of taste or smell.  She does continue to smoke.  She states she has been taking her allergy and asthma medications as directed.    Past Medical History  Past Medical History:   Diagnosis Date    Allergy     Anemia     Anxiety     Asthma     Back pain     Chronic bronchitis     Chronic obstructive pulmonary disease, unspecified COPD type 2022    Cigarette smoker     DDD (degenerative disc disease), lumbar 2020    Depression     Diabetes mellitus with peripheral circulatory disorder 2022    Diabetes with neurologic complications     DUB (dysfunctional uterine bleeding) 10/16/2018    GERD (gastroesophageal reflux disease)     High cholesterol     Hyperprolactinemia     Hypertension     Influenza A 2020    Neuromuscular disorder     Obese body habitus     Obesity     Pseudotumor cerebri     Renal manifestation of secondary diabetes mellitus     Respiratory failure     Seizures     Simple endometrial hyperplasia     Sleep apnea     Smoker     Tobacco dependence     Urinary incontinence        Past Surgical History  Past Surgical History:   Procedure Laterality Date    ANTROSTOMY OF MAXILLARY SINUS AT INFERIOR NASAL MEATUS  10/22/2021    Procedure: MAXILLARY ANTROSTOMY, AT INFERIOR NASAL MEATUS;  Surgeon: John Prado III, MD;  Location: [facility];  Service: ENT;;    BREAST CYST ASPIRATION       SECTION      X 1    CHOLECYSTECTOMY      COLONOSCOPY      COLONOSCOPY N/A 2019    Procedure: COLONOSCOPY;  Surgeon: Jagruti Sweeney MD;  Location: [facility] ([address] [place] [address]);   Service: Endoscopy;  Laterality: N/A;  BMI is 63/gastroparesis/3 days full liquid diet 1 day clear liquid see telephone encounter by Ciara Pinto [facility]    EPIDURAL STEROID INJECTION N/A 9/11/2020    Procedure: INJECTION, STEROID, EPIDURAL, L5-S1 IL;  Surgeon: Lawrence Marin MD;  Location: [facility] MGT;  Service: Pain Management;  Laterality: N/A;    ESOPHAGEAL MANOMETRY WITH MEASUREMENT OF IMPEDANCE N/A 11/5/2019    Procedure: MANOMETRY-ESOPHAGEAL-WITH IMPEDANCE;  Surgeon: Jagruti Sweeney MD;  Location: [facility] (2ND FLR);  Service: Endoscopy;  Laterality: N/A;  Hold Narcotics x 1 days   Hold TCA x 1 days  Propofol only. No fentanyl or benzodiazepine during sedation. If additional sedation needed, discuss with Dr. Sweeney.  10/29 - pt confirmed appt    ESOPHAGOGASTRODUODENOSCOPY N/A 1/14/2019    Procedure: EGD (ESOPHAGOGASTRODUODENOSCOPY);  Surgeon: Jagruti Sweeney MD;  Location: [facility] ([address] [place] [address]);  Service: Endoscopy;  Laterality: N/A;  BMI is 63/gastroparesis/3 days full liquid diet 1 day clear liquid see telephone encounter by Ciara Pinto [facility]    ESOPHAGOGASTRODUODENOSCOPY N/A 11/5/2019    Procedure: ESOPHAGOGASTRODUODENOSCOPY (EGD);  Surgeon: Jagruti Sweeney MD;  Location: [facility] ([address] [place] [address]);  Service: Endoscopy;  Laterality: N/A;  EGD with EndoFlip /+/- Botox  2nd floor for gastroparesis/BMI 63 **367lbs**  Bariatric Stretcher needed  Manometry probe to be placed endoscopically during EGD procedure  Due to change in protocol, Full liquid diet for 3 days/ 1 day of clear liquids  Hi    ESOPHAGOGASTRODUODENOSCOPY N/A 12/14/2020    Procedure: ESOPHAGOGASTRODUODENOSCOPY (EGD) with BRAVO;  Surgeon: Jagruti Sweeney MD;  Location: [facility] (2ND FLR);  Service: Endoscopy;  Laterality: N/A;  with Dilation  2nd floor due to BMI 56.20 (327 lbs) and gastroparesis  3 days full liquid diet and 1 day clears    ESOPHAGOGASTRODUODENOSCOPY N/A 4/15/2021    Procedure: ESOPHAGOGASTRODUODENOSCOPY (EGD);   Surgeon: Jagruti Sweeney MD;  Location: [facility] ENDO (2ND FLR);  Service: Endoscopy;  Laterality: N/A;  Endoflip  2nd floor-gastroparesis, BMI 57.18, bariatric stretcher  full liquid diet x3 days, clear liquid diet x1 day prior to procedure  propofol only  covid test 4/12 primary care, instructions emailed-[facility]    FOOT FRACTURE SURGERY Left     FUNCTIONAL ENDOSCOPIC SINUS SURGERY (FESS) USING COMPUTER-ASSISTED NAVIGATION Bilateral 10/22/2021    Procedure: FESS, USING COMPUTER-ASSISTED NAVIGATION;  Surgeon: John Prado III, MD;  Location: [facility];  Service: ENT;  Laterality: Bilateral;  FOLLOW DR PRADO ANESTHESIA PROTOCAL / pt will stay 23 hour post surgery for SHARATH observation    HYSTEROSCOPY WITH DILATION AND CURETTAGE OF UTERUS N/A 10/16/2018    K[facility]    INJECTION OF ANESTHETIC AGENT AROUND NERVE Bilateral 10/23/2020    Procedure: BLOCK, NERVE  bilateral L3,4,5 MBB;  Surgeon: Lawrence Marin MD;  Location: [facility] PAIN MGT;  Service: Pain Management;  Laterality: Bilateral;  bilateral L3,4,5 MBB   consent needed    INJECTION OF ANESTHETIC AGENT AROUND NERVE Bilateral 2/18/2022    Procedure: BLOCK, NERVE, L3-L4-L5 MEDIAL BRANCH;  Surgeon: Lawrence Marin MD;  Location: [facility] PAIN MGT;  Service: Pain Management;  Laterality: Bilateral;    PLANTAR FASCIOTOMY Left 11/18/2019    Procedure: FASCIOTOMY, PLANTAR;  Surgeon: Valentino Orourke DPM;  Location: [facility] 2ND FLR;  Service: Podiatry;  Laterality: Left;    RETINAL LASER PROCEDURE Left     SINUS SURGERY      SPHENOIDECTOMY Bilateral 10/22/2021    Procedure: SPHENOIDECTOMY;  Surgeon: John Prado III, MD;  Location: [facility];  Service: ENT;  Laterality: Bilateral;    TRANSFORAMINAL EPIDURAL INJECTION OF STEROID Bilateral 6/24/2020    Procedure: INJECTION, STEROID, EPIDURAL, TRANSFORAMINAL APPROACH;  Surgeon: Lawrence Mrain MD;  Location: [facility] PAIN MGT;  Service: Pain Management;  Laterality: Bilateral;    TRANSFORAMINAL EPIDURAL INJECTION OF STEROID  Bilateral 1/28/2022    Procedure: INJECTION, STEROID, EPIDURAL, TRANSFORAMINAL APPROACH  Bilateral TFESI L4/L5      NEEDS CONSENT;  Surgeon: Lawrence Marin MD;  [facility];  Service: Pain Management;  Laterality: Bilateral;    UPPER GASTROINTESTINAL ENDOSCOPY         Family History  Family History   Problem Relation Age of Onset    Hypertension Mother     Diabetes Mother     Colon cancer Mother 50    Colon polyps Mother     Cataracts Mother     Heart disease Father     COPD Father     Heart attack Father     Hypertension Father     Ulcers Father     Cataracts Father     Glaucoma Father     Cancer Maternal Grandmother     Breast cancer Maternal Grandmother     Colon cancer Maternal Grandmother     Colon polyps Maternal Grandmother     No Known Problems Paternal Grandmother     No Known Problems Brother     No Known Problems Maternal Aunt     No Known Problems Maternal Uncle     No Known Problems Paternal Aunt     No Known Problems Paternal Uncle     No Known Problems Maternal Grandfather     No Known Problems Paternal Grandfather     No Known Problems Daughter     No Known Problems Son     No Known Problems Daughter     No Known Problems Daughter     Celiac disease Neg Hx     Cirrhosis Neg Hx     Crohn's disease Neg Hx     Cystic fibrosis Neg Hx     Esophageal cancer Neg Hx     Hemochromatosis Neg Hx     Inflammatory bowel disease Neg Hx     Irritable bowel syndrome Neg Hx     Liver cancer Neg Hx     Liver disease Neg Hx     Rectal cancer Neg Hx     Stomach cancer Neg Hx     Ulcerative colitis Neg Hx     Jonnie's disease Neg Hx     Tuberculosis Neg Hx     Lymphoma Neg Hx     Scleroderma Neg Hx     Rheum arthritis Neg Hx     Melanoma Neg Hx     Multiple sclerosis Neg Hx     Psoriasis Neg Hx     Lupus Neg Hx     Skin cancer Neg Hx     Amblyopia Neg Hx     Blindness Neg Hx     Macular degeneration Neg Hx     Retinal detachment Neg Hx     Strabismus  Neg Hx     Stroke Neg Hx     Thyroid disease Neg Hx     Allergic rhinitis Neg Hx     Allergies Neg Hx     Angioedema Neg Hx     Asthma Neg Hx     Eczema Neg Hx     Immunodeficiency Neg Hx     Rhinitis Neg Hx     Urticaria Neg Hx     Atopy Neg Hx        Social History  Social History     Socioeconomic History    Marital status:     Number of children: 4   Occupational History    Occupation: disable   Tobacco Use    Smoking status: Current Every Day Smoker     Packs/day: 1.00     Years: 27.00     Pack years: 27.00     Types: Cigarettes     Start date: 1/1/1992    Smokeless tobacco: Never Used    Tobacco comment: 1/2 a pack if her days are good   Substance and Sexual Activity    Alcohol use: Not Currently     Alcohol/week: 0.0 standard drinks     Comment: socially    Drug use: No    Sexual activity: Yes     Partners: Male     Birth control/protection: None     Comment:      Social Determinants of Health     Financial Resource Strain: High Risk    Difficulty of Paying Living Expenses: Hard   Food Insecurity: Food Insecurity Present    Worried About Running Out of Food in the Last Year: Sometimes true    Ran Out of Food in the Last Year: Sometimes true   Transportation Needs: Unmet Transportation Needs    Lack of Transportation (Medical): Yes    Lack of Transportation (Non-Medical): Yes   Physical Activity: Insufficiently Active    Days of Exercise per Week: 1 day    Minutes of Exercise per Session: 30 min   Stress: Stress Concern Present    Feeling of Stress : Very much   Social Connections: Unknown    Frequency of Communication with Friends and Family: Twice a week    Frequency of Social Gatherings with Friends and Family: Once a week    Active Member of Clubs or Organizations: Yes    Attends Club or Organization Meetings: More than 4 times per year    Marital Status:    Housing Stability: High Risk    Unable to Pay for Housing in the Last Year: Yes    Number of  Places Lived in the Last Year: 2    Unstable Housing in the Last Year: No       Current Medications  Current Outpatient Medications on File Prior to Visit   Medication Sig Dispense Refill    albuterol (PROVENTIL) 2.5 mg /3 mL (0.083 %) nebulizer solution Take 3 mLs (2.5 mg total) by nebulization every 6 (six) hours as needed for Wheezing. Rescue 3 mL 11    albuterol (PROVENTIL/VENTOLIN HFA) 90 mcg/actuation inhaler Inhale 2 puffs into the lungs every 6 (six) hours as needed for Wheezing or Shortness of Breath. Rescue 54 g 6    albuterol-ipratropium (DUO-NEB) 2.5 mg-0.5 mg/3 mL nebulizer solution Take 3 mLs by nebulization every 6 (six) hours as needed for Wheezing. Rescue 75 mL 0    alcohol swabs (BD ALCOHOL SWABS) PadM Apply 1 each topically 2 (two) times a day. 200 each 4    allerg xt,D.farinae-D.pteronys (ODACTRA) 12 SQ-HDM Subl Place 1 tablet under the tongue once daily. 30 tablet 11    ammonium lactate 12 % Crea Apply twice daily to affected parts both feet as needed. 140 g 11    azelastine (ASTELIN) 137 mcg (0.1 %) nasal spray 1 spray (137 mcg total) by Nasal route 2 (two) times daily. 30 mL 11    blood sugar diagnostic Strp 1 each by Misc.(Non-Drug; Combo Route) route 4 (four) times daily before meals and nightly. ACCU CHEK ELVIA PLUS METER 200 each 3    blood-glucose meter (ACCU-CHEK ELVIA PLUS METER) Misc TEST FOUR TIMES DAILY BEFORE MEALS AND EVERY NIGHT 90 each 1    budesonide-formoterol 160-4.5 mcg (SYMBICORT) 160-4.5 mcg/actuation HFAA Inhale 2 puffs into the lungs every 12 (twelve) hours. Controller 10.2 g 11    cetirizine (ZYRTEC) 10 MG tablet Take 1 tablet (10 mg total) by mouth daily as needed for Allergies (itching). 30 tablet 0    clobetasoL (TEMOVATE) 0.05 % external solution Apply topically 2 (two) times daily. Prn scalp itch or rash 60 mL 1    diclofenac sodium (VOLTAREN) 1 % Gel Apply 2 g topically 3 (three) times daily. Apply to painful joints 300 g 2    estradioL (ESTRACE)  0.01 % (0.1 mg/gram) vaginal cream Use 1 gram of estrogen cream in vagina nightly x 2 weeks, then twice a week thereafter. 42.5 g 3    fluticasone propionate (FLONASE) 50 mcg/actuation nasal spray 2 sprays (100 mcg total) by Each Nostril route 2 (two) times a day. 16 g 11    JARDIANCE 10 mg tablet TAKE 1 TABLET(10 MG) BY MOUTH EVERY DAY 90 tablet 0    lancets (ACCU-CHEK FASTCLIX LANCET DRUM) Misc 1 Device by Misc.(Non-Drug; Combo Route) route 2 (two) times a day. 200 each 4    lancets (ACCU-CHEK SOFTCLIX LANCETS) Misc 1 Device by Misc.(Non-Drug; Combo Route) route 4 (four) times daily. 200 each 3    levocetirizine (XYZAL) 5 MG tablet Take 1 tablet (5 mg total) by mouth every evening. 90 tablet 3    lidocaine (LIDODERM) 5 % Place 1 patch onto the skin once daily. Remove & Discard patch within 12 hours or as directed by MD 15 patch 0    LIDOcaine HCL 2% (XYLOCAINE) 2 % jelly Apply topically as needed. Apply topically once nightly to affected part of foot/feet. 30 mL 2    losartan (COZAAR) 100 MG tablet Take 1 tablet (100 mg total) by mouth once daily. 90 tablet 0    montelukast (SINGULAIR) 10 mg tablet Take 1 tablet (10 mg total) by mouth every evening. 90 tablet 1    MOVANTIK 12.5 mg Tab Take 1 tablet by mouth once daily.      nicotine (NICODERM CQ) 21 mg/24 hr Place 1 patch onto the skin once daily. 28 patch 0    nicotine, polacrilex, (NICORETTE) 2 mg Gum Take 1 each (2 mg total) by mouth as needed (Take 1 piece as needed.  Maximum of 10 per day.). 220 each 0    nystatin-triamcinolone (MYCOLOG) ointment Apply topically 2 (two) times daily. 60 g 2    ondansetron (ZOFRAN) 8 MG tablet Take 1 tablet (8 mg total) by mouth every 8 (eight) hours as needed for Nausea. 90 tablet 11    oxyCODONE-acetaminophen (PERCOCET) 7.5-325 mg per tablet Take 1 tablet by mouth 3 (three) times daily as needed for Pain. 90 tablet 0    OZEMPIC 1 mg/dose (4 mg/3 mL) Inject 1 mg into the skin every 7 days. 3 pen 0     pantoprazole (PROTONIX) 40 MG tablet TAKE 1 TABLET(40 MG) BY MOUTH TWICE DAILY 180 tablet 0    pantoprazole (PROTONIX) 40 MG tablet TAKE 1 TABLET(40 MG) BY MOUTH TWICE DAILY 180 tablet 0    pregabalin (LYRICA) 50 MG capsule Take 1 capsule (50 mg total) by mouth 3 (three) times daily. 90 capsule 3    QUEtiapine (SEROQUEL) 25 MG Tab Take 1 tablet (25 mg total) by mouth once daily. 90 tablet 0    sodium chloride (SALINE NASAL) 0.65 % nasal spray 2 sprays by Nasal route as needed for Congestion. 104 mL 12    topiramate (TOPAMAX) 50 MG tablet Take 2 tablets (100 mg total) by mouth every evening. 180 tablet 3    triamcinolone acetonide 0.1% (KENALOG) 0.1 % cream Apply topically 2 (two) times daily. Prn focally for rash spots on forehead and legs.Stop using steroid topical when skin is smooth and non itchy.  Do not treat dark or red coloring. 45 g 0    venlafaxine (EFFEXOR-XR) 75 MG 24 hr capsule Take 1 capsule (75 mg total) by mouth once daily. NO FURTHER REFILL WITHOUT APT 90 capsule 1    EPINEPHrine (EPIPEN) 0.3 mg/0.3 mL AtIn Inject 0.3 mLs (0.3 mg total) into the muscle once. for 1 dose 2 each 1     Current Facility-Administered Medications on File Prior to Visit   Medication Dose Route Frequency Provider Last Rate Last Admin    albuterol inhaler 2 puff  2 puff Inhalation Q20 Min PRN Carlyle Gordillo MD        diphenhydrAMINE injection 25 mg  25 mg Intravenous Once PRN Carlyle Gordillo MD        EPINEPHrine (EPIPEN) 0.3 mg/0.3 mL pen injection 0.3 mg  0.3 mg Intramuscular PRN Carlyle Gordillo MD        methylPREDNISolone sodium succinate injection 40 mg  40 mg Intravenous Once PRN Carlyle Gordillo MD        ondansetron disintegrating tablet 4 mg  4 mg Oral Once PRN Carlyle Gordillo MD        sodium chloride 0.9% 500 mL flush bag   Intravenous PRN Carlyle Gordillo MD        sodium chloride 0.9% flush 10 mL  10 mL Intravenous PRN Carlyle Gordillo MD           Allergies   Review of patient's allergies  "indicates:   Allergen Reactions    Gabapentin Other (See Comments)     Makes her twitch       Review of Systems (Pertinent positives)  Review of Systems   Constitutional: Negative.    HENT: Positive for congestion and sinus pain.    Eyes: Negative.    Respiratory: Positive for cough and sputum production. Negative for hemoptysis, shortness of breath and wheezing.    Gastrointestinal: Negative.    Musculoskeletal: Negative.          /84   Pulse 76   Temp 98.4 °F (36.9 °C) (Oral)   Resp 18   Ht 5' 4" (1.626 m)   Wt (!) 162.8 kg (358 lb 14.5 oz)   LMP 08/17/2019   SpO2 98%   BMI 61.61 kg/m²     GENERAL APPEARANCE: in no apparent distress and well developed and well nourished  HEENT: PERRL, EOMI, Sclera clear, anicteric, Oropharynx clear, no lesions, Neck supple with midline trachea  NECK: normal, supple, no adenopathy, thyroid normal in size  RESPIRATORY: appears well, vitals normal, no respiratory distress, acyanotic, normal RR, chest clear, no wheezing, crepitations, rhonchi, normal symmetric air entry  HEART: regular rate and rhythm, S1, S2 normal, no murmur, click, rub or gallop.    ABDOMEN: abdomen is soft without tenderness, no masses, no hernias, no organomegaly, no rebound, no guarding. Suprapubic tenderness absent.   SKIN: no rashes, no wounds, no other lesions  PSYCH: Alert, oriented x 3, thought content appropriate, speech normal, pleasant and cooperative, good eye contact, well groomed,    Assessment/Plan:  Yanni Kaiser is a 51 y.o. female who presents today for :    Yanni was seen today for sinus problem and knee pain.    Diagnoses and all orders for this visit:    Acute bacterial sinusitis  -     doxycycline (VIBRAMYCIN) 100 MG Cap; Take 1 capsule (100 mg total) by mouth every 12 (twelve) hours. for 10 days    Primary osteoarthritis of both knees  -     sulindac (CLINORIL) 200 MG Tab; Take 1 tablet (200 mg total) by mouth 2 (two) times daily.      1.  Doxycycline for sinus " infection  2.  Sulindac for knee pain  3.  Call with any concerns  4.  Follow up 3 months or sooner if needed      Carlyle Gordillo MD

## 2022-07-01 ENCOUNTER — TELEPHONE (OUTPATIENT)
Dept: ALLERGY | Facility: CLINIC | Age: 52
End: 2022-07-01
Payer: MEDICARE

## 2022-07-01 NOTE — TELEPHONE ENCOUNTER
Called to reschedule pt to another day due to provider changing clinic days.  Rescheduled pt from 7/5 to 7/13/22.  Pt agreed to appt change.  Mailed copy of appt.

## 2022-07-05 ENCOUNTER — PATIENT MESSAGE (OUTPATIENT)
Dept: BARIATRICS | Facility: CLINIC | Age: 52
End: 2022-07-05
Payer: MEDICARE

## 2022-07-06 ENCOUNTER — OFFICE VISIT (OUTPATIENT)
Dept: BARIATRICS | Facility: CLINIC | Age: 52
End: 2022-07-06
Payer: MEDICARE

## 2022-07-06 VITALS
WEIGHT: 293 LBS | HEART RATE: 84 BPM | DIASTOLIC BLOOD PRESSURE: 73 MMHG | SYSTOLIC BLOOD PRESSURE: 134 MMHG | TEMPERATURE: 98 F | BODY MASS INDEX: 61.54 KG/M2 | OXYGEN SATURATION: 97 %

## 2022-07-06 DIAGNOSIS — E66.01 CLASS 3 SEVERE OBESITY DUE TO EXCESS CALORIES WITH SERIOUS COMORBIDITY AND BODY MASS INDEX (BMI) OF 60.0 TO 69.9 IN ADULT: ICD-10-CM

## 2022-07-06 DIAGNOSIS — Z79.4 TYPE 2 DIABETES MELLITUS WITH STAGE 3A CHRONIC KIDNEY DISEASE, WITH LONG-TERM CURRENT USE OF INSULIN: Primary | ICD-10-CM

## 2022-07-06 DIAGNOSIS — N18.31 TYPE 2 DIABETES MELLITUS WITH STAGE 3A CHRONIC KIDNEY DISEASE, WITH LONG-TERM CURRENT USE OF INSULIN: Primary | ICD-10-CM

## 2022-07-06 DIAGNOSIS — E11.22 TYPE 2 DIABETES MELLITUS WITH STAGE 3A CHRONIC KIDNEY DISEASE, WITH LONG-TERM CURRENT USE OF INSULIN: Primary | ICD-10-CM

## 2022-07-06 DIAGNOSIS — G43.009 MIGRAINE WITHOUT AURA AND WITHOUT STATUS MIGRAINOSUS, NOT INTRACTABLE: ICD-10-CM

## 2022-07-06 PROCEDURE — 3066F PR DOCUMENTATION OF TREATMENT FOR NEPHROPATHY: ICD-10-PCS | Mod: CPTII,S$GLB,, | Performed by: INTERNAL MEDICINE

## 2022-07-06 PROCEDURE — 1160F RVW MEDS BY RX/DR IN RCRD: CPT | Mod: CPTII,S$GLB,, | Performed by: INTERNAL MEDICINE

## 2022-07-06 PROCEDURE — 1160F PR REVIEW ALL MEDS BY PRESCRIBER/CLIN PHARMACIST DOCUMENTED: ICD-10-PCS | Mod: CPTII,S$GLB,, | Performed by: INTERNAL MEDICINE

## 2022-07-06 PROCEDURE — 3078F DIAST BP <80 MM HG: CPT | Mod: CPTII,S$GLB,, | Performed by: INTERNAL MEDICINE

## 2022-07-06 PROCEDURE — 3044F HG A1C LEVEL LT 7.0%: CPT | Mod: CPTII,S$GLB,, | Performed by: INTERNAL MEDICINE

## 2022-07-06 PROCEDURE — 3061F NEG MICROALBUMINURIA REV: CPT | Mod: CPTII,S$GLB,, | Performed by: INTERNAL MEDICINE

## 2022-07-06 PROCEDURE — 3061F PR NEG MICROALBUMINURIA RESULT DOCUMENTED/REVIEW: ICD-10-PCS | Mod: CPTII,S$GLB,, | Performed by: INTERNAL MEDICINE

## 2022-07-06 PROCEDURE — 99214 OFFICE O/P EST MOD 30 MIN: CPT | Mod: S$GLB,,, | Performed by: INTERNAL MEDICINE

## 2022-07-06 PROCEDURE — 3066F NEPHROPATHY DOC TX: CPT | Mod: CPTII,S$GLB,, | Performed by: INTERNAL MEDICINE

## 2022-07-06 PROCEDURE — 4010F ACE/ARB THERAPY RXD/TAKEN: CPT | Mod: CPTII,S$GLB,, | Performed by: INTERNAL MEDICINE

## 2022-07-06 PROCEDURE — 3008F BODY MASS INDEX DOCD: CPT | Mod: CPTII,S$GLB,, | Performed by: INTERNAL MEDICINE

## 2022-07-06 PROCEDURE — 3075F PR MOST RECENT SYSTOLIC BLOOD PRESS GE 130-139MM HG: ICD-10-PCS | Mod: CPTII,S$GLB,, | Performed by: INTERNAL MEDICINE

## 2022-07-06 PROCEDURE — 99999 PR PBB SHADOW E&M-EST. PATIENT-LVL V: ICD-10-PCS | Mod: PBBFAC,,, | Performed by: INTERNAL MEDICINE

## 2022-07-06 PROCEDURE — 1159F MED LIST DOCD IN RCRD: CPT | Mod: CPTII,S$GLB,, | Performed by: INTERNAL MEDICINE

## 2022-07-06 PROCEDURE — 1159F PR MEDICATION LIST DOCUMENTED IN MEDICAL RECORD: ICD-10-PCS | Mod: CPTII,S$GLB,, | Performed by: INTERNAL MEDICINE

## 2022-07-06 PROCEDURE — 99214 PR OFFICE/OUTPT VISIT, EST, LEVL IV, 30-39 MIN: ICD-10-PCS | Mod: S$GLB,,, | Performed by: INTERNAL MEDICINE

## 2022-07-06 PROCEDURE — 3008F PR BODY MASS INDEX (BMI) DOCUMENTED: ICD-10-PCS | Mod: CPTII,S$GLB,, | Performed by: INTERNAL MEDICINE

## 2022-07-06 PROCEDURE — 99999 PR PBB SHADOW E&M-EST. PATIENT-LVL V: CPT | Mod: PBBFAC,,, | Performed by: INTERNAL MEDICINE

## 2022-07-06 PROCEDURE — 4010F PR ACE/ARB THEARPY RXD/TAKEN: ICD-10-PCS | Mod: CPTII,S$GLB,, | Performed by: INTERNAL MEDICINE

## 2022-07-06 PROCEDURE — 3044F PR MOST RECENT HEMOGLOBIN A1C LEVEL <7.0%: ICD-10-PCS | Mod: CPTII,S$GLB,, | Performed by: INTERNAL MEDICINE

## 2022-07-06 PROCEDURE — 3078F PR MOST RECENT DIASTOLIC BLOOD PRESSURE < 80 MM HG: ICD-10-PCS | Mod: CPTII,S$GLB,, | Performed by: INTERNAL MEDICINE

## 2022-07-06 PROCEDURE — 3075F SYST BP GE 130 - 139MM HG: CPT | Mod: CPTII,S$GLB,, | Performed by: INTERNAL MEDICINE

## 2022-07-06 NOTE — PROGRESS NOTES
Subjective:       Patient ID: Yanni Kaiser is a 51 y.o. female.    Chief Complaint: Follow-up    Pt here today for follow-up. Has lost net neg 14 lbs (0 lbs muscle. -3 lbs fat).    . Started LCHF diet and ozempic. State she is not eating much starch. Has given up sweet tea. Denies SE with ozempic 1 mg. Also on Jardiance for DM. BP is good today. A1c with significant improvement.   She has been more active. Went through functional restoration program and now going to gym. SHe also quit smoking 2 weeks ago.   Has not been on trokendi for migraines because the pill is too large. Appears she should be on 600 mg a day with Herb Fry MD, but pt is not on it either.   States she is avoiding fried foods. Eating more baked chicken, shrimp, fish, pasta or salad.     Lab Results   Component Value Date    HGBA1C 6.4 (H) 06/20/2022    HGBA1C 8.7 (H) 03/30/2022    HGBA1C 5.6 08/26/2021     Lab Results   Component Value Date    MICROALBUR 7.2 04/01/2014    LDLCALC 126.4 06/20/2022    CREATININE 1.3 11/10/2021     New BMR:     New PBF: 55.1%    Initial inbody 4/6/22.  BMR: 1918  PBF: 55.9%    Follow-up  Associated symptoms include arthralgias and chest pain. Pertinent negatives include no chills or fever.     Review of Systems   Constitutional: Negative for chills and fever.   Respiratory: Positive for apnea and shortness of breath.         Does not use CPAP machine. States did not like it.    Cardiovascular: Positive for chest pain and leg swelling.        With asthma sx   Gastrointestinal: Positive for constipation and diarrhea.        + GERD   Endocrine: Positive for cold intolerance.   Genitourinary: Negative for difficulty urinating and dysuria.           Musculoskeletal: Positive for arthralgias and back pain.   Neurological: Positive for dizziness and light-headedness.   Psychiatric/Behavioral: Positive for dysphoric mood. The patient is not nervous/anxious.        Objective:     /73 (BP  Location: Left arm, Patient Position: Sitting)   Pulse 84   Temp 98.4 °F (36.9 °C)   Wt (!) 162.6 kg (358 lb 8 oz)   LMP 08/17/2019   SpO2 97%   BMI 61.54 kg/m²     Physical Exam  Vitals reviewed.   Constitutional:       General: She is not in acute distress.     Appearance: She is well-developed.      Comments: Morbidly obese     HENT:      Head: Normocephalic and atraumatic.   Eyes:      General: No scleral icterus.     Pupils: Pupils are equal, round, and reactive to light.   Cardiovascular:      Rate and Rhythm: Normal rate.   Pulmonary:      Effort: Pulmonary effort is normal.   Musculoskeletal:         General: Normal range of motion.      Cervical back: Normal range of motion and neck supple.   Skin:     General: Skin is warm and dry.      Findings: No erythema.   Neurological:      Mental Status: She is alert and oriented to person, place, and time.      Cranial Nerves: No cranial nerve deficit.   Psychiatric:         Behavior: Behavior normal.         Judgment: Judgment normal.         Assessment:       1. Type 2 diabetes mellitus with stage 3a chronic kidney disease, with long-term current use of insulin    2. Class 3 severe obesity due to excess calories with serious comorbidity and body mass index (BMI) of 60.0 to 69.9 in adult    3. Migraine without aura and without status migrainosus, not intractable        Plan:         Yanni was seen today for follow-up.    Diagnoses and all orders for this visit:    Type 2 diabetes mellitus with stage 3a chronic kidney disease, with long-term current use of insulin  -     semaglutide 2 mg/dose (8 mg/3 mL) PnIj; Inject 2 mg into the skin every 7 days.    Class 3 severe obesity due to excess calories with serious comorbidity and body mass index (BMI) of 60.0 to 69.9 in adult    Migraine without aura and without status migrainosus, not intractable         Patient was informed that topiramate is used for migraine prevention and seizures. Weight loss is a common  side effect that is well documented. S/he understands this. S/he was informed of the potential side effects such as serious and possibly fatal rash in which case the medication should be discontinued immediately. Paresthesias, forgetfulness, fatigue, kidney stones, GI symptoms, and changes in lab values such as electrolytes, blood counts and kidney function.     topiramate 100 mg evening.     Continue with Ozempic 2 mg once a week.      Decrease portions as soon as you start Ozempic. Some nausea in the first 2 weeks is not unusual.     If you get pain across the upper abdomen and around to your back, please call the office.     Add some type of resistance training 2-3 days a week. These can be body weight exercises, light weight or elastic bands. [employer] and [employer] are great sources for free work out plans and videos.     1500 rachid pb meal planner given.

## 2022-07-06 NOTE — PATIENT INSTRUCTIONS
Patient was informed that topiramate is used for migraine prevention and seizures. Weight loss is a common side effect that is well documented. S/he understands this. S/he was informed of the potential side effects such as serious and possibly fatal rash in which case the medication should be discontinued immediately. Paresthesias, forgetfulness, fatigue, kidney stones, GI symptoms, and changes in lab values such as electrolytes, blood counts and kidney function.     topiramate 100 mg in the evening.     Continue with Ozempic 2 mg once a week.      Decrease portions as soon as you start Ozempic. Some nausea in the first 2 weeks is not unusual.     If you get pain across the upper abdomen and around to your back, please call the office.     Add some type of resistance training 2-3 days a week. These can be body weight exercises, light weight or elastic bands. [employer] and [employer] are great sources for free work out plans and videos.     1500 rachid pb meal planner given.     Tips for preparing for hurricane season:    If you are on weight loss medications, please take your medication with you in case of evacuation. Injectable medications should be transported with an ice pack if unopened or room temperature if you have started to use them.   Hurricane supplies do not necessarily need to be junk food or high in carbs or sugar. Shelf stable and healthy choices to include in your supplies could include:  Canned/packets of tuna or chicken  Apples, oranges, banana, pears  Beef or turkey jerky  Sugar free pudding  Pickle, olives, dill relish (mix with the tuna or chicken!)  Low sodium or no salt added canned vegetables  If you get bread, get lite bread (40 calories a slice)  0 sugar sports drinks  Water  String cheese will be okay for a few days without refrigeration or in an ice chest.   Peanut or other nut butter.   Parmesan crisps  Pork skins  Protein shakes (20-30g protein, less than 5 g sugar)  Protein bars (15 or more  g protein, less than 5 g sugar)  Don't forget disposable forks, spoons, plates in your supplies  If evacuated, manage stress by taking walks, reading or meditating.   If eating out make better choices when possible.   Salads with a lean protein and limited dressing, cheese or other toppings  Grilled chicken sandwich or burger without the bun.   Skip the fries!  Order water or unsweetened tea instead of soda  Grocery stores with a deli, salad/food bar can be a good quick and affordable option to replace fast food

## 2022-07-07 ENCOUNTER — OFFICE VISIT (OUTPATIENT)
Dept: PODIATRY | Facility: CLINIC | Age: 52
End: 2022-07-07
Payer: MEDICARE

## 2022-07-07 VITALS
WEIGHT: 293 LBS | BODY MASS INDEX: 61.53 KG/M2 | DIASTOLIC BLOOD PRESSURE: 82 MMHG | HEART RATE: 85 BPM | SYSTOLIC BLOOD PRESSURE: 120 MMHG

## 2022-07-07 DIAGNOSIS — E11.22 TYPE 2 DIABETES MELLITUS WITH STAGE 3A CHRONIC KIDNEY DISEASE, WITH LONG-TERM CURRENT USE OF INSULIN: Primary | ICD-10-CM

## 2022-07-07 DIAGNOSIS — Z79.4 TYPE 2 DIABETES MELLITUS WITH STAGE 3A CHRONIC KIDNEY DISEASE, WITH LONG-TERM CURRENT USE OF INSULIN: Primary | ICD-10-CM

## 2022-07-07 DIAGNOSIS — N18.31 TYPE 2 DIABETES MELLITUS WITH STAGE 3A CHRONIC KIDNEY DISEASE, WITH LONG-TERM CURRENT USE OF INSULIN: Primary | ICD-10-CM

## 2022-07-07 DIAGNOSIS — G89.4 CHRONIC PAIN SYNDROME: ICD-10-CM

## 2022-07-07 DIAGNOSIS — M72.2 PLANTAR FASCIAL FIBROMATOSIS OF LEFT FOOT: ICD-10-CM

## 2022-07-07 PROCEDURE — 4010F PR ACE/ARB THEARPY RXD/TAKEN: ICD-10-PCS | Mod: CPTII,S$GLB,, | Performed by: PODIATRIST

## 2022-07-07 PROCEDURE — 3044F PR MOST RECENT HEMOGLOBIN A1C LEVEL <7.0%: ICD-10-PCS | Mod: CPTII,S$GLB,, | Performed by: PODIATRIST

## 2022-07-07 PROCEDURE — 3079F DIAST BP 80-89 MM HG: CPT | Mod: CPTII,S$GLB,, | Performed by: PODIATRIST

## 2022-07-07 PROCEDURE — 99214 OFFICE O/P EST MOD 30 MIN: CPT | Mod: S$GLB,,, | Performed by: PODIATRIST

## 2022-07-07 PROCEDURE — 3079F PR MOST RECENT DIASTOLIC BLOOD PRESSURE 80-89 MM HG: ICD-10-PCS | Mod: CPTII,S$GLB,, | Performed by: PODIATRIST

## 2022-07-07 PROCEDURE — 4010F ACE/ARB THERAPY RXD/TAKEN: CPT | Mod: CPTII,S$GLB,, | Performed by: PODIATRIST

## 2022-07-07 PROCEDURE — 3066F PR DOCUMENTATION OF TREATMENT FOR NEPHROPATHY: ICD-10-PCS | Mod: CPTII,S$GLB,, | Performed by: PODIATRIST

## 2022-07-07 PROCEDURE — 3074F SYST BP LT 130 MM HG: CPT | Mod: CPTII,S$GLB,, | Performed by: PODIATRIST

## 2022-07-07 PROCEDURE — 99214 PR OFFICE/OUTPT VISIT, EST, LEVL IV, 30-39 MIN: ICD-10-PCS | Mod: S$GLB,,, | Performed by: PODIATRIST

## 2022-07-07 PROCEDURE — 3066F NEPHROPATHY DOC TX: CPT | Mod: CPTII,S$GLB,, | Performed by: PODIATRIST

## 2022-07-07 PROCEDURE — 3074F PR MOST RECENT SYSTOLIC BLOOD PRESSURE < 130 MM HG: ICD-10-PCS | Mod: CPTII,S$GLB,, | Performed by: PODIATRIST

## 2022-07-07 PROCEDURE — 3008F BODY MASS INDEX DOCD: CPT | Mod: CPTII,S$GLB,, | Performed by: PODIATRIST

## 2022-07-07 PROCEDURE — 3008F PR BODY MASS INDEX (BMI) DOCUMENTED: ICD-10-PCS | Mod: CPTII,S$GLB,, | Performed by: PODIATRIST

## 2022-07-07 PROCEDURE — 99999 PR PBB SHADOW E&M-EST. PATIENT-LVL IV: CPT | Mod: PBBFAC,,, | Performed by: PODIATRIST

## 2022-07-07 PROCEDURE — 99999 PR PBB SHADOW E&M-EST. PATIENT-LVL IV: ICD-10-PCS | Mod: PBBFAC,,, | Performed by: PODIATRIST

## 2022-07-07 PROCEDURE — 3061F PR NEG MICROALBUMINURIA RESULT DOCUMENTED/REVIEW: ICD-10-PCS | Mod: CPTII,S$GLB,, | Performed by: PODIATRIST

## 2022-07-07 PROCEDURE — 3044F HG A1C LEVEL LT 7.0%: CPT | Mod: CPTII,S$GLB,, | Performed by: PODIATRIST

## 2022-07-07 PROCEDURE — 3061F NEG MICROALBUMINURIA REV: CPT | Mod: CPTII,S$GLB,, | Performed by: PODIATRIST

## 2022-07-07 RX ORDER — LIDOCAINE HYDROCHLORIDE 20 MG/ML
JELLY TOPICAL
Qty: 30 ML | Refills: 2 | Status: SHIPPED | OUTPATIENT
Start: 2022-07-07 | End: 2022-08-16 | Stop reason: SDUPTHER

## 2022-07-07 RX ORDER — AMITRIPTYLINE HYDROCHLORIDE 10 MG/1
10 TABLET, FILM COATED ORAL NIGHTLY PRN
Qty: 30 TABLET | Refills: 2 | Status: SHIPPED | OUTPATIENT
Start: 2022-07-07 | End: 2022-10-04

## 2022-07-13 ENCOUNTER — CLINICAL SUPPORT (OUTPATIENT)
Dept: SMOKING CESSATION | Facility: CLINIC | Age: 52
End: 2022-07-13
Payer: COMMERCIAL

## 2022-07-13 ENCOUNTER — OFFICE VISIT (OUTPATIENT)
Dept: ALLERGY | Facility: CLINIC | Age: 52
End: 2022-07-13
Payer: MEDICARE

## 2022-07-13 VITALS
DIASTOLIC BLOOD PRESSURE: 66 MMHG | OXYGEN SATURATION: 100 % | WEIGHT: 293 LBS | BODY MASS INDEX: 62.33 KG/M2 | SYSTOLIC BLOOD PRESSURE: 124 MMHG | HEART RATE: 76 BPM

## 2022-07-13 DIAGNOSIS — J45.909 ASTHMA, UNSPECIFIED ASTHMA SEVERITY, UNSPECIFIED WHETHER COMPLICATED, UNSPECIFIED WHETHER PERSISTENT: ICD-10-CM

## 2022-07-13 DIAGNOSIS — F17.200 NICOTINE DEPENDENCE: Primary | ICD-10-CM

## 2022-07-13 DIAGNOSIS — J32.9 CHRONIC SINUSITIS, UNSPECIFIED LOCATION: ICD-10-CM

## 2022-07-13 PROCEDURE — 99999 PR PBB SHADOW E&M-EST. PATIENT-LVL IV: ICD-10-PCS | Mod: PBBFAC,GC,, | Performed by: STUDENT IN AN ORGANIZED HEALTH CARE EDUCATION/TRAINING PROGRAM

## 2022-07-13 PROCEDURE — 99999 PR PBB SHADOW E&M-EST. PATIENT-LVL I: CPT | Mod: PBBFAC,,,

## 2022-07-13 PROCEDURE — 99214 OFFICE O/P EST MOD 30 MIN: CPT | Mod: GC,S$GLB,, | Performed by: STUDENT IN AN ORGANIZED HEALTH CARE EDUCATION/TRAINING PROGRAM

## 2022-07-13 PROCEDURE — 99999 PR PBB SHADOW E&M-EST. PATIENT-LVL I: ICD-10-PCS | Mod: PBBFAC,,,

## 2022-07-13 PROCEDURE — 99407 PR TOBACCO USE CESSATION INTENSIVE >10 MINUTES: ICD-10-PCS | Mod: S$GLB,,, | Performed by: GENERAL PRACTICE

## 2022-07-13 PROCEDURE — 99214 PR OFFICE/OUTPT VISIT, EST, LEVL IV, 30-39 MIN: ICD-10-PCS | Mod: GC,S$GLB,, | Performed by: STUDENT IN AN ORGANIZED HEALTH CARE EDUCATION/TRAINING PROGRAM

## 2022-07-13 PROCEDURE — 99999 PR PBB SHADOW E&M-EST. PATIENT-LVL IV: CPT | Mod: PBBFAC,GC,, | Performed by: STUDENT IN AN ORGANIZED HEALTH CARE EDUCATION/TRAINING PROGRAM

## 2022-07-13 PROCEDURE — 99499 RISK ADDL DX/OHS AUDIT: ICD-10-PCS | Mod: S$GLB,,, | Performed by: STUDENT IN AN ORGANIZED HEALTH CARE EDUCATION/TRAINING PROGRAM

## 2022-07-13 PROCEDURE — 99407 BEHAV CHNG SMOKING > 10 MIN: CPT | Mod: S$GLB,,, | Performed by: GENERAL PRACTICE

## 2022-07-13 PROCEDURE — 99499 UNLISTED E&M SERVICE: CPT | Mod: S$GLB,,, | Performed by: STUDENT IN AN ORGANIZED HEALTH CARE EDUCATION/TRAINING PROGRAM

## 2022-07-13 RX ORDER — BUDESONIDE AND FORMOTEROL FUMARATE DIHYDRATE 160; 4.5 UG/1; UG/1
2 AEROSOL RESPIRATORY (INHALATION) EVERY 12 HOURS
Qty: 10.2 G | Refills: 11 | Status: SHIPPED | OUTPATIENT
Start: 2022-07-13 | End: 2022-10-05 | Stop reason: ALTCHOICE

## 2022-07-13 RX ORDER — LINACLOTIDE 290 UG/1
290 CAPSULE, GELATIN COATED ORAL DAILY
COMMUNITY
Start: 2022-07-08 | End: 2022-11-02

## 2022-07-13 RX ORDER — AZELASTINE 1 MG/ML
2 SPRAY, METERED NASAL 2 TIMES DAILY
Qty: 30 ML | Refills: 11 | Status: SHIPPED | OUTPATIENT
Start: 2022-07-13 | End: 2022-10-05 | Stop reason: SDUPTHER

## 2022-07-13 RX ORDER — FLUTICASONE PROPIONATE 50 MCG
2 SPRAY, SUSPENSION (ML) NASAL 2 TIMES DAILY
Qty: 16 G | Refills: 11 | Status: SHIPPED | OUTPATIENT
Start: 2022-07-13 | End: 2022-11-02 | Stop reason: SDUPTHER

## 2022-07-13 NOTE — PROGRESS NOTES
Spoke with patient today in regard to smoking cessation progress 6 month telephone follow up, she states that she is tobacco free.  Commended patient on the accomplishments thus far.  Informed patient of benefit period, future follow up, and contact information if any further help or support is needed.  Will complete smart form for 6 month follow up on Quit attempt #2.

## 2022-07-13 NOTE — PROGRESS NOTES
"ALLERGY & IMMUNOLOGY CLINIC - FOLLOW UP     HISTORY OF PRESENT ILLNESS     Patient ID: Yanni Kaiser is a 51 y.o. female    CC: "observed dose of odactra"    HPI: Ms. Kaiser is a 51 year old female with a history of chronic rhinosinusitus and asthma who presents to clinic today for follow up.     AR: Odactra has been causing itching of ears and throat thus she would like to stop. She trialed this medication for a few weeks and she did not notice much improvement in nasal symptoms. She is continues to use flonase 2 SEN daily and azelastine 2 SEN daily.     Asthma: Currently using Symbicort 2 puffs daily and albuterol MDI/nebulizer as needed. She was using albuterol 2-3 times per day when Consuelo dust was around. She is also taking montelukast.        REVIEW OF SYSTEMS     Review of Systems   Constitutional: Negative for chills and fever.   HENT: Positive for congestion. Negative for ear pain and sore throat.    Respiratory: Positive for cough. Negative for shortness of breath and wheezing.    Gastrointestinal: Negative for abdominal pain, diarrhea, nausea and vomiting.   Skin: Negative for itching and rash.     Balance of review of systems negative except as mentioned above     MEDICAL HISTORY     MedHx: active problems reviewed  SurgHx:   Past Surgical History:   Procedure Laterality Date    ANTROSTOMY OF MAXILLARY SINUS AT INFERIOR NASAL MEATUS  10/22/2021    Procedure: MAXILLARY ANTROSTOMY, AT INFERIOR NASAL MEATUS;  Surgeon: John Prado III, MD;  Location: [facility];  Service: ENT;;    BREAST CYST ASPIRATION       SECTION      X 1    CHOLECYSTECTOMY      COLONOSCOPY      COLONOSCOPY N/A 2019    Procedure: COLONOSCOPY;  Surgeon: Jagruti Sweeney MD;  Location: [address]);  Service: Endoscopy;  Laterality: N/A;  BMI is 63/gastroparesis/3 days full liquid diet 1 day clear liquid see telephone encounter by Ciara limon    EPIDURAL STEROID INJECTION N/A 2020    " Procedure: INJECTION, STEROID, EPIDURAL, L5-S1 IL;  Surgeon: Lawrence Marin MD;  Location: [facility];  Service: Pain Management;  Laterality: N/A;    ESOPHAGEAL MANOMETRY WITH MEASUREMENT OF IMPEDANCE N/A 11/5/2019    Procedure: MANOMETRY-ESOPHAGEAL-WITH IMPEDANCE;  Surgeon: Jagruti Sweeney MD;  Location: [facility] (2ND FLR);  Service: Endoscopy;  Laterality: N/A;  Hold Narcotics x 1 days   Hold TCA x 1 days  Propofol only. No fentanyl or benzodiazepine during sedation. If additional sedation needed, discuss with Dr. Sweeney.  10/29 - pt confirmed appt    ESOPHAGOGASTRODUODENOSCOPY N/A 1/14/2019    Procedure: EGD (ESOPHAGOGASTRODUODENOSCOPY);  Surgeon: Jagruti Sweeney MD;  Location: [facility] (2ND FLR);  Service: Endoscopy;  Laterality: N/A;  BMI is 63/gastroparesis/3 days full liquid diet 1 day clear liquid see telephone encounter by Ciara limon    ESOPHAGOGASTRODUODENOSCOPY N/A 11/5/2019    Procedure: ESOPHAGOGASTRODUODENOSCOPY (EGD);  Surgeon: Jagruti Sweeney MD;  Location: [facility] (2ND FLR);  Service: Endoscopy;  Laterality: N/A;  EGD with EndoFlip /+/- Botox  2nd floor for gastroparesis/BMI 63 **367lbs**  Bariatric Stretcher needed  Manometry probe to be placed endoscopically during EGD procedure  Due to change in protocol, Full liquid diet for 3 days/ 1 day of clear liquids  Hi    ESOPHAGOGASTRODUODENOSCOPY N/A 12/14/2020    Procedure: ESOPHAGOGASTRODUODENOSCOPY (EGD) with BRAVO;  Surgeon: Jagruti Sweeney MD;  Location: [facility] (2ND FLR);  Service: Endoscopy;  Laterality: N/A;  with Dilation  2nd floor due to BMI 56.20 (327 lbs) and gastroparesis  3 days full liquid diet and 1 day clears    ESOPHAGOGASTRODUODENOSCOPY N/A 4/15/2021    Procedure: ESOPHAGOGASTRODUODENOSCOPY (EGD);  Surgeon: Jagruti Sweeney MD;  Location: [facility] ENDO (2ND FLR);  Service: Endoscopy;  Laterality: N/A;  Endoflip  2nd floor-gastroparesis, BMI 57.18, bariatric stretcher  full liquid diet x3 days, clear liquid  diet x1 day prior to procedure  propofol only  covid test 4/12 primary care, instructions emailed-[facility]    FOOT FRACTURE SURGERY Left     FUNCTIONAL ENDOSCOPIC SINUS SURGERY (FESS) USING COMPUTER-ASSISTED NAVIGATION Bilateral 10/22/2021    Procedure: FESS, USING COMPUTER-ASSISTED NAVIGATION;  Surgeon: John Prado III, MD;  Location: [facility];  Service: ENT;  Laterality: Bilateral;  FOLLOW DR PRADO ANESTHESIA PROTOCAL / pt will stay 23 hour post surgery for SHARATH observation    HYSTEROSCOPY WITH DILATION AND CURETTAGE OF UTERUS N/A 10/16/2018    K[facility]    INJECTION OF ANESTHETIC AGENT AROUND NERVE Bilateral 10/23/2020    Procedure: BLOCK, NERVE  bilateral L3,4,5 MBB;  Surgeon: Lawrence Marin MD;  Location: [facility] PAIN MGT;  Service: Pain Management;  Laterality: Bilateral;  bilateral L3,4,5 MBB   consent needed    INJECTION OF ANESTHETIC AGENT AROUND NERVE Bilateral 2/18/2022    Procedure: BLOCK, NERVE, L3-L4-L5 MEDIAL BRANCH;  Surgeon: Lawrence Marin MD;  Location: [facility]IN [facility]GT;  Service: Pain Management;  Laterality: Bilateral;    PLANTAR FASCIOTOMY Left 11/18/2019    Procedure: FASCIOTOMY, PLANTAR;  Surgeon: Valentino Orourke DPM;  Location: [address];  Service: Podiatry;  Laterality: Left;    RETINAL LASER PROCEDURE Left     SINUS SURGERY      SPHENOIDECTOMY Bilateral 10/22/2021    Procedure: SPHENOIDECTOMY;  Surgeon: John rPado III, MD;  Location: [facility];  Service: ENT;  Laterality: Bilateral;    TRANSFORAMINAL EPIDURAL INJECTION OF STEROID Bilateral 6/24/2020    Procedure: INJECTION, STEROID, EPIDURAL, TRANSFORAMINAL APPROACH;  Surgeon: Lawrence Marin MD;  Location: [facility]IN [facility]GT;  Service: Pain Management;  Laterality: Bilateral;    TRANSFORAMINAL EPIDURAL INJECTION OF STEROID Bilateral 1/28/2022    Procedure: INJECTION, STEROID, EPIDURAL, TRANSFORAMINAL APPROACH  Bilateral TFESI L4/L5      NEEDS CONSENT;  Surgeon: Lawrence Marin MD;  Location: [facility]IN [facility]GT;  Service: Pain  Management;  Laterality: Bilateral;    UPPER GASTROINTESTINAL ENDOSCOPY       Allergies: see below  Medications: MAR reviewed     PHYSICAL EXAM     Physical Exam  Constitutional:       Appearance: Normal appearance.   HENT:      Head: Normocephalic and atraumatic.      Right Ear: External ear normal.      Left Ear: External ear normal.   Eyes:      Extraocular Movements: Extraocular movements intact.      Conjunctiva/sclera: Conjunctivae normal.      Pupils: Pupils are equal, round, and reactive to light.   Cardiovascular:      Rate and Rhythm: Normal rate and regular rhythm.      Heart sounds: Normal heart sounds. No murmur heard.  Pulmonary:      Effort: Pulmonary effort is normal. No respiratory distress.      Breath sounds: Normal breath sounds. No wheezing or rales.   Musculoskeletal:      Cervical back: Normal range of motion.   Skin:     General: Skin is warm and dry.      Findings: No rash (see images).   Neurological:      General: No focal deficit present.      Mental Status: She is alert and oriented to person, place, and time.   Psychiatric:         Mood and Affect: Mood normal.         Behavior: Behavior normal.         Thought Content: Thought content normal.         Judgment: Judgment normal.          LABORATORY STUDIES     Reviewed     ALLERGEN TESTING     Component      Latest Ref Rng & Units 1/4/2018 1/4/2018 1/4/2018          10:57 AM 10:57 AM 10:57 AM   D. farinae      <0.35 kU/L     0.43 (H)   D. farinae Class           CLASS I   Mite Dust Pteronyssinus IgE      <0.35 kU/L     0.43 (H)   D. pteronyssinus Class           CLASS I   BERMUDA GRASS      <0.35 kU/L     <0.35   Bermuda Grass Class           CLASS 0   Valentino Grass      <0.35 kU/L     <0.35   Valentino Grass Class           CLASS 0   Floyd IgE      <0.35 kU/L     <0.35   Floyd Class           CLASS 0   Plantain      <0.35 kU/L     <0.35   English Plantain Class           CLASS 0   White Oak(Quercus alba) IgE      <0.35 kU/L     <0.35    Oak, Class           CLASS 0   Marshelder IgE      <0.35 kU/L     <0.35   Marshelder Class           CLASS 0   Ragweed, Western IgE      <0.35 kU/L     <0.35   Ragweed, Western, Class           CLASS 0   Alternaria alternata      <0.35 kU/L     <0.35   Altern. alternata Class           CLASS 0   Aspergillus Fumigatus IgE      <0.35 kU/L     <0.35   A. fumigatus Class           CLASS 0   Cat Dander      <0.35 kU/L     <0.35   Cat Epithelium Class           CLASS 0   Cockroach, IgE      <0.35 kU/L CLASS 0 <0.35     Dog Dander, IgE      <0.35 kU/L     <0.35   Dog Dander Class           CLASS 0   SHRIMP IGE      <0.35 kU/L     <0.35   Shrimp Class           CLASS 0   Crayfish      <0.35 kU/L     <0.35   Crayfish Class           CLASS 0   Crab      <0.35 kU/L     <0.35   Crab Class           CLASS 0   Lobster IgE      <0.35 kU/L     <0.35   Lobster Class           CLASS 0   IgE      0 - 100 IU/mL     59          PULMONARY FUNCTION     PFTs: ordered and pending     IMAGING & OTHER DIAGNOSTICS     No new chest or sinus images     ASSESSMENT & PLAN     Yanni Kaiser is a 51 y.o. female with     Allergic rhinitis: Patient sensitized to HDM and trialed Odactra without improvement in symptoms and with significant oral and ear pruritus. She would like to stop this medication.   - Stop daily odactra  - Increase flonase to 2 SEN BID and azelastine to 2 SEN BID  - She is not interested in AIT at this time    Asthma: only taking controller daily instead of BID, requiring increased albuterol use   - Will reorder PFTs as not scheduled or performed  - Continue daily montelukast  - Increase symbicort to 2 puffs BID  - Continue albuterol as needed for shortness of breath        Follow up: 4-5 months    Patient staffed with attending Dr. Jaimie Huerta MD  Allergy/Immunology Fellow

## 2022-07-17 NOTE — PROGRESS NOTES
Subjective:      Patient ID: Yanni Kaiser is a 51 y.o. female.    Chief Complaint: No chief complaint on file.  She has a new issue of discomfort to the left great toe joint.  She has generalized foot pain in multiple joint pain she a is seeing physical medication/pain management.     Review of Systems   Constitutional: Negative for chills, decreased appetite, fever, malaise/fatigue and night sweats.   HENT: Negative for congestion, ear discharge, hearing loss, nosebleeds and tinnitus.    Eyes: Negative for double vision, pain and visual disturbance.   Cardiovascular: Negative for chest pain, claudication, cyanosis and palpitations.   Respiratory: Negative for cough, hemoptysis, shortness of breath and wheezing.    Endocrine: Negative for cold intolerance and heat intolerance.   Hematologic/Lymphatic: Negative for adenopathy and bleeding problem.   Skin: Positive for color change, dry skin, nail changes and unusual hair distribution. Negative for flushing and rash.   Musculoskeletal: Positive for arthritis and stiffness. Negative for joint pain and myalgias.        Pain to both feet   Gastrointestinal: Negative for abdominal pain, dysphagia, nausea and vomiting.   Genitourinary: Negative for dysuria, flank pain, hematuria and pelvic pain.   Neurological: Positive for disturbances in coordination. Negative for loss of balance, numbness, paresthesias and sensory change.   Psychiatric/Behavioral: Negative for altered mental status, hallucinations and suicidal ideas. The patient does not have insomnia.    Allergic/Immunologic: Negative for environmental allergies and persistent infections.           Objective:      Physical Exam  Vitals reviewed.   Constitutional:       Appearance: She is well-developed.   HENT:      Head: Normocephalic and atraumatic.   Cardiovascular:      Pulses:           Dorsalis pedis pulses are 2+ on the right side and 2+ on the left side.        Posterior tibial pulses are 2+ on the  right side and 2+ on the left side.   Pulmonary:      Effort: Pulmonary effort is normal.   Musculoskeletal:         General: Normal range of motion.      Right knee: No swelling or ecchymosis.      Left knee: No swelling or ecchymosis.      Right lower leg: No swelling, deformity, tenderness or bony tenderness. No edema.      Left lower leg: No swelling or tenderness. No edema.      Right ankle: No swelling or ecchymosis. No lateral malleolus or medial malleolus tenderness. Normal range of motion. Normal pulse.      Right Achilles Tendon: No defects. Copeland's test negative.      Left ankle: No swelling or ecchymosis. No lateral malleolus or medial malleolus tenderness. Normal range of motion. Normal pulse.      Left Achilles Tendon: Copeland's test negative.      Right foot: Normal range of motion. No deformity.      Left foot: Normal range of motion. No deformity.      Comments: Tenderness noted to the plantar left great toe joint underlying the sesamoid complex.   Feet:      Right foot:      Protective Sensation: 5 sites tested. 5 sites sensed.      Skin integrity: Callus and dry skin present.      Left foot:      Protective Sensation: 5 sites tested. 5 sites sensed.      Skin integrity: Callus and dry skin present.   Skin:     General: Skin is warm and dry.      Capillary Refill: Capillary refill takes more than 3 seconds.      Coloration: Skin is pale.      Findings: No abrasion, bruising, burn or ecchymosis.      Comments: No open lesions noted to b/L lower extremities.     Examination of the skin reveals no evidence of significant rashes, open lesions, suspicious appearing nevi or other concerning lesions.      Neurological:      Mental Status: She is alert and oriented to person, place, and time.      Sensory: Sensory deficit present.      Motor: Atrophy present.      Deep Tendon Reflexes: Reflexes abnormal.      Reflex Scores:       Patellar reflexes are 1+ on the right side and 1+ on the left side.        Achilles reflexes are 1+ on the right side and 1+ on the left side.     Comments: Tenderness in Tinel sign noted to the left deep peroneal and tarsal tunnel region.  Positive Tinel sign and provocation sign noted.  Light touch intact.  MMT 5/5 DF, PF, Inv, Ev  Normal muscle tone.  No signs of atrophy.  No tremor or spasticity.     Psychiatric:         Attention and Perception: She is attentive.         Speech: Speech normal.         Behavior: Behavior normal.               Assessment:       Encounter Diagnoses   Name Primary?    Type 2 diabetes mellitus with stage 3a chronic kidney disease, with long-term current use of insulin Yes    Plantar fascial fibromatosis of left foot     Chronic pain syndrome          Plan:       Diagnoses and all orders for this visit:    Type 2 diabetes mellitus with stage 3a chronic kidney disease, with long-term current use of insulin    Plantar fascial fibromatosis of left foot    Chronic pain syndrome    Other orders  -     LIDOcaine HCL 2% (XYLOCAINE) 2 % jelly; Apply topically as needed. Apply topically once nightly to affected part of foot/feet.  -     amitriptyline (ELAVIL) 10 MG tablet; Take 1 tablet (10 mg total) by mouth nightly as needed for Insomnia.      I counseled the patient on her conditions, their implications and medical management.        The nature of the condition, options for management, as well as potential risks and complications were discussed in detail with patient. Patient was amenable to my recommendations and left my office fully informed and will follow up as instructed or sooner if necessary.        Conservative surgical options discussed in detail.  Appropriate shoe gear and orthotic with metatarsal pad discussed.  Patient declined corticosteroid today.  Follow-up in 6-8 weeks.

## 2022-07-26 NOTE — TELEPHONE ENCOUNTER
Please advice patient that home sleep study order was placed.  Patient should expect a call from [facility].    
Still have not received record from [facility].  Please d/w patient if she is ok with repeat sleep study.    
Yes

## 2022-07-27 ENCOUNTER — PATIENT MESSAGE (OUTPATIENT)
Dept: ALLERGY | Facility: CLINIC | Age: 52
End: 2022-07-27
Payer: MEDICARE

## 2022-07-28 ENCOUNTER — SPECIALTY PHARMACY (OUTPATIENT)
Dept: PHARMACY | Facility: CLINIC | Age: 52
End: 2022-07-28
Payer: MEDICARE

## 2022-08-02 ENCOUNTER — PATIENT MESSAGE (OUTPATIENT)
Dept: BARIATRICS | Facility: CLINIC | Age: 52
End: 2022-08-02
Payer: MEDICARE

## 2022-08-04 ENCOUNTER — PES CALL (OUTPATIENT)
Dept: ADMINISTRATIVE | Facility: CLINIC | Age: 52
End: 2022-08-04
Payer: MEDICARE

## 2022-08-04 ENCOUNTER — PATIENT MESSAGE (OUTPATIENT)
Dept: BARIATRICS | Facility: CLINIC | Age: 52
End: 2022-08-04
Payer: MEDICARE

## 2022-08-05 PROBLEM — Z78.9 ALTERATION IN PERFORMANCE OF ACTIVITIES OF DAILY LIVING: Status: RESOLVED | Noted: 2022-03-15 | Resolved: 2022-08-05

## 2022-08-05 PROBLEM — F40.298 FEAR OF PAIN: Status: RESOLVED | Noted: 2022-03-15 | Resolved: 2022-08-05

## 2022-08-08 ENCOUNTER — TELEPHONE (OUTPATIENT)
Dept: ADMINISTRATIVE | Facility: CLINIC | Age: 52
End: 2022-08-08
Payer: MEDICARE

## 2022-08-08 NOTE — TELEPHONE ENCOUNTER
Called pt, informed pt I was calling to remind pt of her in office EAWV on 8/10/22; clinic location provided to patient; pt confirmed appointment

## 2022-08-16 DIAGNOSIS — M54.41 CHRONIC MIDLINE LOW BACK PAIN WITH BILATERAL SCIATICA: ICD-10-CM

## 2022-08-16 DIAGNOSIS — G89.29 CHRONIC MIDLINE LOW BACK PAIN WITH BILATERAL SCIATICA: ICD-10-CM

## 2022-08-16 DIAGNOSIS — M79.7 FIBROMYALGIA SYNDROME: ICD-10-CM

## 2022-08-16 DIAGNOSIS — M54.42 CHRONIC MIDLINE LOW BACK PAIN WITH BILATERAL SCIATICA: ICD-10-CM

## 2022-08-16 DIAGNOSIS — M17.0 PRIMARY OSTEOARTHRITIS OF BOTH KNEES: ICD-10-CM

## 2022-08-16 RX ORDER — SULINDAC 200 MG/1
200 TABLET ORAL 2 TIMES DAILY
Qty: 30 TABLET | Refills: 0 | Status: SHIPPED | OUTPATIENT
Start: 2022-08-16 | End: 2022-11-10 | Stop reason: SDUPTHER

## 2022-08-16 RX ORDER — OXYCODONE AND ACETAMINOPHEN 7.5; 325 MG/1; MG/1
1 TABLET ORAL 3 TIMES DAILY PRN
Qty: 90 TABLET | Refills: 0 | Status: CANCELLED | OUTPATIENT
Start: 2022-08-16

## 2022-08-16 RX ORDER — LIDOCAINE HYDROCHLORIDE 20 MG/ML
JELLY TOPICAL
Qty: 30 ML | Refills: 2 | Status: CANCELLED | OUTPATIENT
Start: 2022-08-16

## 2022-08-16 NOTE — TELEPHONE ENCOUNTER
I refilled her sulindac.  Her controlled medications will need to come from Dr. Migdalia Gordillo

## 2022-08-16 NOTE — TELEPHONE ENCOUNTER
Care Due:                  Date            Visit Type   Department     Provider  --------------------------------------------------------------------------------                                EP -         [facility]                              PRIMARY      MED/ INTERNAL  Last Visit: 06-      CARE ([facility])   MED/ PEDS      Carlyle A  Page                              EP -         [facility]                              PRIMARY      MED/ INTERNAL  Next Visit: 09-      CARE ([facility])   MED/ PEDS      Carlyle A  Page                                                            Last  Test          Frequency    Reason                     Performed    Due Date  --------------------------------------------------------------------------------    CMP.........  12 months..  shahab PASTOR,       08- 11-                             venlafaxine..............    [employer] Embedded Care Gaps. Reference number: [id number]. 8/16/2022   9:46:09 AM CDT

## 2022-08-16 NOTE — TELEPHONE ENCOUNTER
your message has been received and forwarded to your provider. You will be notified once your provider has responded. Please allow 24-48 hours for a response. Thank you for choosing [facility] and have a good day. vs   Last appt 06/27/22 next office visit 09/28/22 @ 9:00 am vs

## 2022-08-17 ENCOUNTER — PES CALL (OUTPATIENT)
Dept: ADMINISTRATIVE | Facility: CLINIC | Age: 52
End: 2022-08-17
Payer: MEDICARE

## 2022-08-26 ENCOUNTER — TELEPHONE (OUTPATIENT)
Dept: PODIATRY | Facility: CLINIC | Age: 52
End: 2022-08-26
Payer: MEDICARE

## 2022-08-26 NOTE — TELEPHONE ENCOUNTER
----- Message from Ciara Doe sent at 8/26/2022  8:20 AM CDT -----  Contact: [phone]  Luiz is calling to inform they are not able to get that product LIDOcaine HCL 2% (XYLOCAINE) 2 % jelly in the percent but they do carry the 4% and 5% please call and adv @ [phone]

## 2022-08-30 ENCOUNTER — TELEPHONE (OUTPATIENT)
Dept: PAIN MEDICINE | Facility: CLINIC | Age: 52
End: 2022-08-30
Payer: MEDICARE

## 2022-08-30 NOTE — TELEPHONE ENCOUNTER
This message is for patient in regards to his/her appointment 08/31/22 at 9:20 a.m.   With Kat Brooks NP    [facility] Policy: For the safety of all patients and staff members.   During this visit we're informing all patients and visitors to wear face mask at all time here at [facility].  If you have any questions or concerns please contact [phone]        `

## 2022-09-07 ENCOUNTER — PATIENT MESSAGE (OUTPATIENT)
Dept: BARIATRICS | Facility: CLINIC | Age: 52
End: 2022-09-07
Payer: MEDICARE

## 2022-09-08 ENCOUNTER — PATIENT MESSAGE (OUTPATIENT)
Dept: ADMINISTRATIVE | Facility: OTHER | Age: 52
End: 2022-09-08
Payer: MEDICARE

## 2022-09-08 ENCOUNTER — HOSPITAL ENCOUNTER (OUTPATIENT)
Facility: HOSPITAL | Age: 52
LOS: 1 days | Discharge: HOME | End: 2022-09-09
Attending: EMERGENCY MEDICINE | Admitting: STUDENT IN AN ORGANIZED HEALTH CARE EDUCATION/TRAINING PROGRAM
Payer: MEDICARE

## 2022-09-08 DIAGNOSIS — R07.9 CHEST PAIN: ICD-10-CM

## 2022-09-08 DIAGNOSIS — J44.1 ASTHMA EXACERBATION WITH COPD (CHRONIC OBSTRUCTIVE PULMONARY DISEASE): ICD-10-CM

## 2022-09-08 DIAGNOSIS — U07.1 COVID-19 VIRUS INFECTION: Primary | ICD-10-CM

## 2022-09-08 DIAGNOSIS — J45.901 ASTHMA EXACERBATION WITH COPD (CHRONIC OBSTRUCTIVE PULMONARY DISEASE): ICD-10-CM

## 2022-09-08 DIAGNOSIS — J44.9 CHRONIC OBSTRUCTIVE PULMONARY DISEASE, UNSPECIFIED COPD TYPE: ICD-10-CM

## 2022-09-08 DIAGNOSIS — R00.0 TACHYCARDIA: ICD-10-CM

## 2022-09-08 DIAGNOSIS — U07.1 COVID-19: ICD-10-CM

## 2022-09-08 DIAGNOSIS — U07.1 COVID: ICD-10-CM

## 2022-09-08 LAB
ALBUMIN SERPL BCP-MCNC: 3.4 G/DL (ref 3.5–5.2)
ALP SERPL-CCNC: 68 U/L (ref 55–135)
ALT SERPL W/O P-5'-P-CCNC: 17 U/L (ref 10–44)
ANION GAP SERPL CALC-SCNC: 5 MMOL/L (ref 8–16)
AST SERPL-CCNC: 14 U/L (ref 10–40)
BASOPHILS # BLD AUTO: 0.04 K/UL (ref 0–0.2)
BASOPHILS NFR BLD: 1.1 % (ref 0–1.9)
BILIRUB SERPL-MCNC: 0.5 MG/DL (ref 0.1–1)
BILIRUB UR QL STRIP: NEGATIVE
BUN SERPL-MCNC: 10 MG/DL (ref 6–20)
CALCIUM SERPL-MCNC: 8.7 MG/DL (ref 8.7–10.5)
CHLORIDE SERPL-SCNC: 109 MMOL/L (ref 95–110)
CLARITY UR: CLEAR
CO2 SERPL-SCNC: 24 MMOL/L (ref 23–29)
COLOR UR: YELLOW
CREAT SERPL-MCNC: 1.1 MG/DL (ref 0.5–1.4)
CTP QC/QA: YES
DIFFERENTIAL METHOD: ABNORMAL
EOSINOPHIL # BLD AUTO: 0.3 K/UL (ref 0–0.5)
EOSINOPHIL NFR BLD: 6.8 % (ref 0–8)
ERYTHROCYTE [DISTWIDTH] IN BLOOD BY AUTOMATED COUNT: 14.6 % (ref 11.5–14.5)
EST. GFR  (NO RACE VARIABLE): >60 ML/MIN/1.73 M^2
GLUCOSE SERPL-MCNC: 86 MG/DL (ref 70–110)
GLUCOSE UR QL STRIP: NEGATIVE
HCT VFR BLD AUTO: 37.2 % (ref 37–48.5)
HGB BLD-MCNC: 12.4 G/DL (ref 12–16)
HGB UR QL STRIP: NEGATIVE
IMM GRANULOCYTES # BLD AUTO: 0.04 K/UL (ref 0–0.04)
IMM GRANULOCYTES NFR BLD AUTO: 1.1 % (ref 0–0.5)
KETONES UR QL STRIP: NEGATIVE
LACTATE SERPL-SCNC: 0.6 MMOL/L (ref 0.5–2.2)
LACTATE SERPL-SCNC: 0.7 MMOL/L (ref 0.5–2.2)
LEUKOCYTE ESTERASE UR QL STRIP: NEGATIVE
LYMPHOCYTES # BLD AUTO: 0.4 K/UL (ref 1–4.8)
LYMPHOCYTES NFR BLD: 11.4 % (ref 18–48)
MCH RBC QN AUTO: 30.9 PG (ref 27–31)
MCHC RBC AUTO-ENTMCNC: 33.3 G/DL (ref 32–36)
MCV RBC AUTO: 93 FL (ref 82–98)
MONOCYTES # BLD AUTO: 0.7 K/UL (ref 0.3–1)
MONOCYTES NFR BLD: 18.1 % (ref 4–15)
NEUTROPHILS # BLD AUTO: 2.3 K/UL (ref 1.8–7.7)
NEUTROPHILS NFR BLD: 61.5 % (ref 38–73)
NITRITE UR QL STRIP: NEGATIVE
NRBC BLD-RTO: 0 /100 WBC
PH UR STRIP: 8 [PH] (ref 5–8)
PLATELET # BLD AUTO: 224 K/UL (ref 150–450)
PMV BLD AUTO: 8.6 FL (ref 9.2–12.9)
POCT GLUCOSE: 140 MG/DL (ref 70–110)
POTASSIUM SERPL-SCNC: 3.7 MMOL/L (ref 3.5–5.1)
PROCALCITONIN SERPL IA-MCNC: 0.06 NG/ML
PROT SERPL-MCNC: 7 G/DL (ref 6–8.4)
PROT UR QL STRIP: NEGATIVE
RBC # BLD AUTO: 4.01 M/UL (ref 4–5.4)
SARS-COV-2 RDRP RESP QL NAA+PROBE: POSITIVE
SODIUM SERPL-SCNC: 138 MMOL/L (ref 136–145)
SP GR UR STRIP: 1.01 (ref 1–1.03)
URN SPEC COLLECT METH UR: NORMAL
UROBILINOGEN UR STRIP-ACNC: NEGATIVE EU/DL
WBC # BLD AUTO: 3.7 K/UL (ref 3.9–12.7)

## 2022-09-08 PROCEDURE — 27100098 HC SPACER

## 2022-09-08 PROCEDURE — 84145 PROCALCITONIN (PCT): CPT | Performed by: STUDENT IN AN ORGANIZED HEALTH CARE EDUCATION/TRAINING PROGRAM

## 2022-09-08 PROCEDURE — 93005 ELECTROCARDIOGRAM TRACING: CPT

## 2022-09-08 PROCEDURE — 94640 AIRWAY INHALATION TREATMENT: CPT

## 2022-09-08 PROCEDURE — 25000242 PHARM REV CODE 250 ALT 637 W/ HCPCS: Performed by: STUDENT IN AN ORGANIZED HEALTH CARE EDUCATION/TRAINING PROGRAM

## 2022-09-08 PROCEDURE — 25000003 PHARM REV CODE 250: Performed by: STUDENT IN AN ORGANIZED HEALTH CARE EDUCATION/TRAINING PROGRAM

## 2022-09-08 PROCEDURE — 25000242 PHARM REV CODE 250 ALT 637 W/ HCPCS: Performed by: EMERGENCY MEDICINE

## 2022-09-08 PROCEDURE — 87070 CULTURE OTHR SPECIMN AEROBIC: CPT | Mod: 59 | Performed by: STUDENT IN AN ORGANIZED HEALTH CARE EDUCATION/TRAINING PROGRAM

## 2022-09-08 PROCEDURE — 27000207 HC ISOLATION

## 2022-09-08 PROCEDURE — 99285 EMERGENCY DEPT VISIT HI MDM: CPT | Mod: 25

## 2022-09-08 PROCEDURE — 21400001 HC TELEMETRY ROOM

## 2022-09-08 PROCEDURE — 93010 ELECTROCARDIOGRAM REPORT: CPT | Mod: ,,, | Performed by: INTERNAL MEDICINE

## 2022-09-08 PROCEDURE — 63600175 PHARM REV CODE 636 W HCPCS: Performed by: STUDENT IN AN ORGANIZED HEALTH CARE EDUCATION/TRAINING PROGRAM

## 2022-09-08 PROCEDURE — 85025 COMPLETE CBC W/AUTO DIFF WBC: CPT | Performed by: EMERGENCY MEDICINE

## 2022-09-08 PROCEDURE — 63600175 PHARM REV CODE 636 W HCPCS: Performed by: EMERGENCY MEDICINE

## 2022-09-08 PROCEDURE — 83605 ASSAY OF LACTIC ACID: CPT | Performed by: EMERGENCY MEDICINE

## 2022-09-08 PROCEDURE — 87040 BLOOD CULTURE FOR BACTERIA: CPT | Mod: 59 | Performed by: EMERGENCY MEDICINE

## 2022-09-08 PROCEDURE — 80053 COMPREHEN METABOLIC PANEL: CPT | Performed by: EMERGENCY MEDICINE

## 2022-09-08 PROCEDURE — 96375 TX/PRO/DX INJ NEW DRUG ADDON: CPT

## 2022-09-08 PROCEDURE — 99900035 HC TECH TIME PER 15 MIN (STAT)

## 2022-09-08 PROCEDURE — 81003 URINALYSIS AUTO W/O SCOPE: CPT | Performed by: EMERGENCY MEDICINE

## 2022-09-08 PROCEDURE — 96365 THER/PROPH/DIAG IV INF INIT: CPT

## 2022-09-08 PROCEDURE — 87205 SMEAR GRAM STAIN: CPT | Performed by: STUDENT IN AN ORGANIZED HEALTH CARE EDUCATION/TRAINING PROGRAM

## 2022-09-08 PROCEDURE — 94761 N-INVAS EAR/PLS OXIMETRY MLT: CPT

## 2022-09-08 PROCEDURE — 25000003 PHARM REV CODE 250: Performed by: EMERGENCY MEDICINE

## 2022-09-08 PROCEDURE — 27100107 HC POCKET PEAK FLOW METER

## 2022-09-08 PROCEDURE — 99291 CRITICAL CARE FIRST HOUR: CPT | Mod: 25

## 2022-09-08 PROCEDURE — 93010 EKG 12-LEAD: ICD-10-PCS | Mod: ,,, | Performed by: INTERNAL MEDICINE

## 2022-09-08 PROCEDURE — U0002 COVID-19 LAB TEST NON-CDC: HCPCS | Performed by: EMERGENCY MEDICINE

## 2022-09-08 RX ORDER — ALBUTEROL SULFATE 2.5 MG/.5ML
7.5 SOLUTION RESPIRATORY (INHALATION)
Status: DISCONTINUED | OUTPATIENT
Start: 2022-09-08 | End: 2022-09-08

## 2022-09-08 RX ORDER — DEXAMETHASONE SODIUM PHOSPHATE 4 MG/ML
4 INJECTION, SOLUTION INTRA-ARTICULAR; INTRALESIONAL; INTRAMUSCULAR; INTRAVENOUS; SOFT TISSUE
Status: COMPLETED | OUTPATIENT
Start: 2022-09-08 | End: 2022-09-08

## 2022-09-08 RX ORDER — QUETIAPINE FUMARATE 25 MG/1
25 TABLET, FILM COATED ORAL DAILY
Status: DISCONTINUED | OUTPATIENT
Start: 2022-09-09 | End: 2022-09-09 | Stop reason: HOSPADM

## 2022-09-08 RX ORDER — ALBUTEROL SULFATE 90 UG/1
2 AEROSOL, METERED RESPIRATORY (INHALATION) 2 TIMES DAILY
Status: DISCONTINUED | OUTPATIENT
Start: 2022-09-09 | End: 2022-09-09 | Stop reason: HOSPADM

## 2022-09-08 RX ORDER — FLUTICASONE FUROATE AND VILANTEROL 100; 25 UG/1; UG/1
1 POWDER RESPIRATORY (INHALATION) DAILY
Refills: 11 | Status: DISCONTINUED | OUTPATIENT
Start: 2022-09-09 | End: 2022-09-09 | Stop reason: HOSPADM

## 2022-09-08 RX ORDER — HEPARIN SODIUM 5000 [USP'U]/ML
5000 INJECTION, SOLUTION INTRAVENOUS; SUBCUTANEOUS EVERY 8 HOURS
Status: DISCONTINUED | OUTPATIENT
Start: 2022-09-08 | End: 2022-09-09 | Stop reason: HOSPADM

## 2022-09-08 RX ORDER — IBUPROFEN 200 MG
1 TABLET ORAL DAILY
Status: DISCONTINUED | OUTPATIENT
Start: 2022-09-09 | End: 2022-09-09 | Stop reason: HOSPADM

## 2022-09-08 RX ORDER — FLUTICASONE PROPIONATE 50 MCG
2 SPRAY, SUSPENSION (ML) NASAL 2 TIMES DAILY
Status: DISCONTINUED | OUTPATIENT
Start: 2022-09-08 | End: 2022-09-09 | Stop reason: HOSPADM

## 2022-09-08 RX ORDER — GUAIFENESIN/DEXTROMETHORPHAN 100-10MG/5
10 SYRUP ORAL EVERY 4 HOURS PRN
Status: DISCONTINUED | OUTPATIENT
Start: 2022-09-08 | End: 2022-09-09 | Stop reason: HOSPADM

## 2022-09-08 RX ORDER — TOPIRAMATE 100 MG/1
100 TABLET, FILM COATED ORAL NIGHTLY
Status: DISCONTINUED | OUTPATIENT
Start: 2022-09-08 | End: 2022-09-09 | Stop reason: HOSPADM

## 2022-09-08 RX ORDER — ACETAMINOPHEN 325 MG/1
650 TABLET ORAL EVERY 4 HOURS PRN
Status: DISCONTINUED | OUTPATIENT
Start: 2022-09-08 | End: 2022-09-09 | Stop reason: HOSPADM

## 2022-09-08 RX ORDER — ONDANSETRON 4 MG/1
8 TABLET, FILM COATED ORAL EVERY 8 HOURS PRN
Status: DISCONTINUED | OUTPATIENT
Start: 2022-09-08 | End: 2022-09-09 | Stop reason: HOSPADM

## 2022-09-08 RX ORDER — TALC
6 POWDER (GRAM) TOPICAL NIGHTLY PRN
Status: DISCONTINUED | OUTPATIENT
Start: 2022-09-08 | End: 2022-09-09 | Stop reason: HOSPADM

## 2022-09-08 RX ORDER — ALBUTEROL SULFATE 90 UG/1
2 AEROSOL, METERED RESPIRATORY (INHALATION) EVERY 6 HOURS
Status: DISCONTINUED | OUTPATIENT
Start: 2022-09-08 | End: 2022-09-08

## 2022-09-08 RX ORDER — MONTELUKAST SODIUM 10 MG/1
10 TABLET ORAL NIGHTLY
Status: DISCONTINUED | OUTPATIENT
Start: 2022-09-08 | End: 2022-09-09 | Stop reason: HOSPADM

## 2022-09-08 RX ORDER — ALBUTEROL SULFATE 90 UG/1
2 AEROSOL, METERED RESPIRATORY (INHALATION) EVERY 6 HOURS
Status: DISCONTINUED | OUTPATIENT
Start: 2022-09-09 | End: 2022-09-08

## 2022-09-08 RX ORDER — AMITRIPTYLINE HYDROCHLORIDE 10 MG/1
10 TABLET, FILM COATED ORAL NIGHTLY PRN
Status: DISCONTINUED | OUTPATIENT
Start: 2022-09-08 | End: 2022-09-09 | Stop reason: HOSPADM

## 2022-09-08 RX ORDER — ASCORBIC ACID 500 MG
500 TABLET ORAL 2 TIMES DAILY
Status: DISCONTINUED | OUTPATIENT
Start: 2022-09-08 | End: 2022-09-09 | Stop reason: HOSPADM

## 2022-09-08 RX ORDER — PREGABALIN 50 MG/1
50 CAPSULE ORAL 3 TIMES DAILY
Status: DISCONTINUED | OUTPATIENT
Start: 2022-09-08 | End: 2022-09-09 | Stop reason: HOSPADM

## 2022-09-08 RX ORDER — IBUPROFEN 200 MG
24 TABLET ORAL
Status: DISCONTINUED | OUTPATIENT
Start: 2022-09-08 | End: 2022-09-08

## 2022-09-08 RX ORDER — IBUPROFEN 200 MG
24 TABLET ORAL
Status: DISCONTINUED | OUTPATIENT
Start: 2022-09-08 | End: 2022-09-09 | Stop reason: HOSPADM

## 2022-09-08 RX ORDER — IBUPROFEN 200 MG
16 TABLET ORAL
Status: DISCONTINUED | OUTPATIENT
Start: 2022-09-08 | End: 2022-09-09 | Stop reason: HOSPADM

## 2022-09-08 RX ORDER — PANTOPRAZOLE SODIUM 40 MG/1
40 TABLET, DELAYED RELEASE ORAL 2 TIMES DAILY
Status: DISCONTINUED | OUTPATIENT
Start: 2022-09-08 | End: 2022-09-09 | Stop reason: HOSPADM

## 2022-09-08 RX ORDER — SODIUM CHLORIDE 0.9 % (FLUSH) 0.9 %
10 SYRINGE (ML) INJECTION
Status: DISCONTINUED | OUTPATIENT
Start: 2022-09-08 | End: 2022-09-09 | Stop reason: HOSPADM

## 2022-09-08 RX ORDER — ALBUTEROL SULFATE 90 UG/1
2 AEROSOL, METERED RESPIRATORY (INHALATION) 4 TIMES DAILY
Status: DISCONTINUED | OUTPATIENT
Start: 2022-09-08 | End: 2022-09-08

## 2022-09-08 RX ORDER — CETIRIZINE HYDROCHLORIDE 10 MG/1
10 TABLET ORAL DAILY
Refills: 3 | Status: DISCONTINUED | OUTPATIENT
Start: 2022-09-09 | End: 2022-09-09 | Stop reason: HOSPADM

## 2022-09-08 RX ORDER — NALOXONE HCL 0.4 MG/ML
0.02 VIAL (ML) INJECTION
Status: DISCONTINUED | OUTPATIENT
Start: 2022-09-08 | End: 2022-09-09 | Stop reason: HOSPADM

## 2022-09-08 RX ORDER — GLUCAGON 1 MG
1 KIT INJECTION
Status: DISCONTINUED | OUTPATIENT
Start: 2022-09-08 | End: 2022-09-09 | Stop reason: HOSPADM

## 2022-09-08 RX ORDER — ALBUTEROL SULFATE 90 UG/1
2 AEROSOL, METERED RESPIRATORY (INHALATION) EVERY 6 HOURS PRN
Status: DISCONTINUED | OUTPATIENT
Start: 2022-09-08 | End: 2022-09-09 | Stop reason: HOSPADM

## 2022-09-08 RX ORDER — GLUCAGON 1 MG
1 KIT INJECTION
Status: DISCONTINUED | OUTPATIENT
Start: 2022-09-08 | End: 2022-09-08

## 2022-09-08 RX ORDER — IBUPROFEN 200 MG
16 TABLET ORAL
Status: DISCONTINUED | OUTPATIENT
Start: 2022-09-08 | End: 2022-09-08

## 2022-09-08 RX ORDER — VENLAFAXINE HYDROCHLORIDE 37.5 MG/1
75 CAPSULE, EXTENDED RELEASE ORAL DAILY
Status: DISCONTINUED | OUTPATIENT
Start: 2022-09-09 | End: 2022-09-09 | Stop reason: HOSPADM

## 2022-09-08 RX ORDER — ACETAMINOPHEN 500 MG
1000 TABLET ORAL ONCE
Status: COMPLETED | OUTPATIENT
Start: 2022-09-08 | End: 2022-09-08

## 2022-09-08 RX ORDER — ACETAMINOPHEN 325 MG/1
650 TABLET ORAL EVERY 8 HOURS PRN
Status: DISCONTINUED | OUTPATIENT
Start: 2022-09-08 | End: 2022-09-09 | Stop reason: HOSPADM

## 2022-09-08 RX ORDER — MAGNESIUM SULFATE 1 G/100ML
1 INJECTION INTRAVENOUS ONCE
Status: COMPLETED | OUTPATIENT
Start: 2022-09-08 | End: 2022-09-08

## 2022-09-08 RX ORDER — INSULIN ASPART 100 [IU]/ML
0-5 INJECTION, SOLUTION INTRAVENOUS; SUBCUTANEOUS
Status: DISCONTINUED | OUTPATIENT
Start: 2022-09-08 | End: 2022-09-09 | Stop reason: HOSPADM

## 2022-09-08 RX ORDER — OXYCODONE AND ACETAMINOPHEN 7.5; 325 MG/1; MG/1
1 TABLET ORAL 3 TIMES DAILY PRN
Status: DISCONTINUED | OUTPATIENT
Start: 2022-09-08 | End: 2022-09-09 | Stop reason: HOSPADM

## 2022-09-08 RX ORDER — LOSARTAN POTASSIUM 25 MG/1
100 TABLET ORAL DAILY
Status: DISCONTINUED | OUTPATIENT
Start: 2022-09-08 | End: 2022-09-09 | Stop reason: HOSPADM

## 2022-09-08 RX ADMIN — MAGNESIUM SULFATE IN DEXTROSE 1 G: 10 INJECTION, SOLUTION INTRAVENOUS at 02:09

## 2022-09-08 RX ADMIN — HEPARIN SODIUM 5000 UNITS: 5000 INJECTION INTRAVENOUS; SUBCUTANEOUS at 10:09

## 2022-09-08 RX ADMIN — DEXAMETHASONE SODIUM PHOSPHATE 4 MG: 4 INJECTION INTRA-ARTICULAR; INTRALESIONAL; INTRAMUSCULAR; INTRAVENOUS; SOFT TISSUE at 02:09

## 2022-09-08 RX ADMIN — TOPIRAMATE 100 MG: 100 TABLET, FILM COATED ORAL at 10:09

## 2022-09-08 RX ADMIN — ALBUTEROL SULFATE 2 PUFF: 90 AEROSOL, METERED RESPIRATORY (INHALATION) at 07:09

## 2022-09-08 RX ADMIN — OXYCODONE HYDROCHLORIDE AND ACETAMINOPHEN 500 MG: 500 TABLET ORAL at 10:09

## 2022-09-08 RX ADMIN — OXYCODONE AND ACETAMINOPHEN 1 TABLET: 7.5; 325 TABLET ORAL at 07:09

## 2022-09-08 RX ADMIN — ALBUTEROL SULFATE 2 PUFF: 90 AEROSOL, METERED RESPIRATORY (INHALATION) at 02:09

## 2022-09-08 RX ADMIN — MONTELUKAST 10 MG: 10 TABLET, FILM COATED ORAL at 10:09

## 2022-09-08 RX ADMIN — ACETAMINOPHEN 1000 MG: 500 TABLET ORAL at 02:09

## 2022-09-08 RX ADMIN — PREGABALIN 50 MG: 50 CAPSULE ORAL at 10:09

## 2022-09-08 RX ADMIN — PANTOPRAZOLE SODIUM 40 MG: 40 TABLET, DELAYED RELEASE ORAL at 10:09

## 2022-09-08 NOTE — ED PROVIDER NOTES
------------------------------------------------------------------------------------------------------------------  JUAN, Meron Tripp, have reviewed the SORT/triage note.      History     Chief Complaint   Patient presents with    Shortness of Breath     51 yo female to triage for SOB x 2 days. Pt has Hx of asthma. Says she has tried inhalers but, is getting worst. Pt could barely talk in triage due to the SOB, Audible wheezing noted.      Hypertension       HPI: A 52 y.o. female with a pertinent PMHx of anemia, asthma/COPD, DM, GERD, high cholesterol, HTN, obesity, and respiratory failure, presents to the ED for evaluation of constant shortness of breath that began 2-3 days ago. Patient reports associated symptoms of a chills, myalgias, dry cough, auditory wheezing, and fever. She used her Albuterol inhaler with no relief. She denies sick contacts. No other exacerbating or alleviating factors. Patient denies any other associated symptoms.       Past Medical History:   Diagnosis Date    Allergy     Anemia     Anxiety     Asthma     Back pain     Chronic bronchitis     Chronic obstructive pulmonary disease, unspecified COPD type 4/1/2022    Cigarette smoker     DDD (degenerative disc disease), lumbar 6/9/2020    Depression     Diabetes mellitus with peripheral circulatory disorder 4/1/2022    Diabetes with neurologic complications     DUB (dysfunctional uterine bleeding) 10/16/2018    GERD (gastroesophageal reflux disease)     High cholesterol     Hyperprolactinemia     Hypertension     Influenza A 01/16/2020    Neuromuscular disorder     Obese body habitus     Obesity     Pseudotumor cerebri     Renal manifestation of secondary diabetes mellitus     Respiratory failure     Seizures     Simple endometrial hyperplasia 2018    Sleep apnea     Smoker     Tobacco dependence     Urinary incontinence      Past Surgical History:   Procedure Laterality Date    ANTROSTOMY OF MAXILLARY SINUS AT INFERIOR NASAL MEATUS   10/22/2021    Procedure: MAXILLARY ANTROSTOMY, AT INFERIOR NASAL MEATUS;  Surgeon: John Prado III, MD;  Location: [facility] OR;  Service: ENT;;    BREAST CYST ASPIRATION       SECTION      X 1    CHOLECYSTECTOMY      COLONOSCOPY      COLONOSCOPY N/A 2019    Procedure: COLONOSCOPY;  Surgeon: Jagruti Sweeney MD;  Location: [facility] (2ND FLR);  Service: Endoscopy;  Laterality: N/A;  BMI is 63/gastroparesis/3 days full liquid diet 1 day clear liquid see telephone encounter by Ciara Brown     EPIDURAL STEROID INJECTION N/A 2020    Procedure: INJECTION, STEROID, EPIDURAL, L5-S1 IL;  Surgeon: Lawrence Marin MD;  Location: [facility] PAIN MGT;  Service: Pain Management;  Laterality: N/A;    ESOPHAGEAL MANOMETRY WITH MEASUREMENT OF IMPEDANCE N/A 2019    Procedure: MANOMETRY-ESOPHAGEAL-WITH IMPEDANCE;  Surgeon: Jagruti Sweeney MD;  Location: [facility] (2ND FLR);  Service: Endoscopy;  Laterality: N/A;  Hold Narcotics x 1 days   Hold TCA x 1 days  Propofol only. No fentanyl or benzodiazepine during sedation. If additional sedation needed, discuss with Dr. Sweeney.  10/29 - pt confirmed appt    ESOPHAGOGASTRODUODENOSCOPY N/A 2019    Procedure: EGD (ESOPHAGOGASTRODUODENOSCOPY);  Surgeon: Jagruti Sweeney MD;  Location: [facility] (2ND FLR);  Service: Endoscopy;  Laterality: N/A;  BMI is 63/gastroparesis/3 days full liquid diet 1 day clear liquid see telephone encounter by Ciara Brown     ESOPHAGOGASTRODUODENOSCOPY N/A 2019    Procedure: ESOPHAGOGASTRODUODENOSCOPY (EGD);  Surgeon: Jagruti Sweeney MD;  Location: [facility] (2ND FLR);  Service: Endoscopy;  Laterality: N/A;  EGD with EndoFlip /+/- Botox  2nd floor for gastroparesis/BMI 63 **367lbs**  Bariatric Stretcher needed  Manometry probe to be placed endoscopically during EGD procedure  Due to change in protocol, Full liquid diet for 3 days/ 1 day of clear liquids  Hi    ESOPHAGOGASTRODUODENOSCOPY N/A 2020    Procedure:  ESOPHAGOGASTRODUODENOSCOPY (EGD) with BRAVO;  Surgeon: Jagruti Sweeney MD;  Location: [facility] ENDO (2ND FLR);  Service: Endoscopy;  Laterality: N/A;  with Dilation  2nd floor due to BMI 56.20 (327 lbs) and gastroparesis  3 days full liquid diet and 1 day clears    ESOPHAGOGASTRODUODENOSCOPY N/A 4/15/2021    Procedure: ESOPHAGOGASTRODUODENOSCOPY (EGD);  Surgeon: Jagruti Sweeney MD;  Location: [facility] ENDO (2ND FLR);  Service: Endoscopy;  Laterality: N/A;  Endoflip  2nd floor-gastroparesis, BMI 57.18, bariatric stretcher  full liquid diet x3 days, clear liquid diet x1 day prior to procedure  propofol only  covid test 4/12 primary care, instructions emailed-[facility]    FOOT FRACTURE SURGERY Left     FUNCTIONAL ENDOSCOPIC SINUS SURGERY (FESS) USING COMPUTER-ASSISTED NAVIGATION Bilateral 10/22/2021    Procedure: FESS, USING COMPUTER-ASSISTED NAVIGATION;  Surgeon: John Prado III, MD;  Location: [facility];  Service: ENT;  Laterality: Bilateral;  FOLLOW DR PRADO ANESTHESIA PROTOCAL / pt will stay 23 hour post surgery for SHARATH observation    HYSTEROSCOPY WITH DILATION AND CURETTAGE OF UTERUS N/A 10/16/2018    KJB    INJECTION OF ANESTHETIC AGENT AROUND NERVE Bilateral 10/23/2020    Procedure: BLOCK, NERVE  bilateral L3,4,5 MBB;  Surgeon: Lawrence Marin MD;  Location: [facility] [place] PAIN MGT;  Service: Pain Management;  Laterality: Bilateral;  bilateral L3,4,5 MBB   consent needed    INJECTION OF ANESTHETIC AGENT AROUND NERVE Bilateral 2/18/2022    Procedure: BLOCK, NERVE, L3-L4-L5 MEDIAL BRANCH;  Surgeon: Lawrence Marin MD;  Location: [facility] [place] PAIN MGT;  Service: Pain Management;  Laterality: Bilateral;    PLANTAR FASCIOTOMY Left 11/18/2019    Procedure: FASCIOTOMY, PLANTAR;  Surgeon: Valentino Orourke DPM;  Location: [facility] 2ND FLR;  Service: Podiatry;  Laterality: Left;    RETINAL LASER PROCEDURE Left     SINUS SURGERY      SPHENOIDECTOMY Bilateral 10/22/2021    Procedure: SPHENOIDECTOMY;  Surgeon: John Prado III, MD;  Location:  [facility] [place] OR;  Service: ENT;  Laterality: Bilateral;    TRANSFORAMINAL EPIDURAL INJECTION OF STEROID Bilateral 2020    Procedure: INJECTION, STEROID, EPIDURAL, TRANSFORAMINAL APPROACH;  Surgeon: Lawrence Marin MD;  Location: [facility] [place] PAIN MGT;  Service: Pain Management;  Laterality: Bilateral;    TRANSFORAMINAL EPIDURAL INJECTION OF STEROID Bilateral 2022    Procedure: INJECTION, STEROID, EPIDURAL, TRANSFORAMINAL APPROACH  Bilateral TFESI L4/L5      NEEDS CONSENT;  Surgeon: Lawrence Marin MD;  Location: [facility] [place] PAIN MGT;  Service: Pain Management;  Laterality: Bilateral;    UPPER GASTROINTESTINAL ENDOSCOPY       Social History     Tobacco Use    Smoking status: Former     Packs/day: 1.00     Years: 27.00     Pack years: 27.00     Types: Cigarettes     Start date: 1992     Quit date: 2022     Years since quittin.2    Smokeless tobacco: Never    Tobacco comments:     1/2 a pack if her days are good   Substance Use Topics    Alcohol use: Not Currently     Alcohol/week: 0.0 standard drinks     Comment: socially    Drug use: No     Family History   Problem Relation Age of Onset    Hypertension Mother     Diabetes Mother     Colon cancer Mother 50    Colon polyps Mother     Cataracts Mother     Heart disease Father     COPD Father     Heart attack Father     Hypertension Father     Ulcers Father     Cataracts Father     Glaucoma Father     Cancer Maternal Grandmother     Breast cancer Maternal Grandmother     Colon cancer Maternal Grandmother     Colon polyps Maternal Grandmother     No Known Problems Paternal Grandmother     No Known Problems Brother     No Known Problems Maternal Aunt     No Known Problems Maternal Uncle     No Known Problems Paternal Aunt     No Known Problems Paternal Uncle     No Known Problems Maternal Grandfather     No Known Problems Paternal Grandfather     No Known Problems Daughter     No Known Problems Son     No Known Problems Daughter     No Known Problems Daughter     Celiac  disease Neg Hx     Cirrhosis Neg Hx     Crohn's disease Neg Hx     Cystic fibrosis Neg Hx     Esophageal cancer Neg Hx     Hemochromatosis Neg Hx     Inflammatory bowel disease Neg Hx     Irritable bowel syndrome Neg Hx     Liver cancer Neg Hx     Liver disease Neg Hx     Rectal cancer Neg Hx     Stomach cancer Neg Hx     Ulcerative colitis Neg Hx     Jonnie's disease Neg Hx     Tuberculosis Neg Hx     Lymphoma Neg Hx     Scleroderma Neg Hx     Rheum arthritis Neg Hx     Melanoma Neg Hx     Multiple sclerosis Neg Hx     Psoriasis Neg Hx     Lupus Neg Hx     Skin cancer Neg Hx     Amblyopia Neg Hx     Blindness Neg Hx     Macular degeneration Neg Hx     Retinal detachment Neg Hx     Strabismus Neg Hx     Stroke Neg Hx     Thyroid disease Neg Hx     Allergic rhinitis Neg Hx     Allergies Neg Hx     Angioedema Neg Hx     Asthma Neg Hx     Eczema Neg Hx     Immunodeficiency Neg Hx     Rhinitis Neg Hx     Urticaria Neg Hx     Atopy Neg Hx      Review of patient's allergies indicates:   Allergen Reactions    Gabapentin Other (See Comments)     Makes her twitch       Current Outpatient Medications:     albuterol (PROVENTIL) 2.5 mg /3 mL (0.083 %) nebulizer solution, Take 3 mLs (2.5 mg total) by nebulization every 6 (six) hours as needed for Wheezing. Rescue, Disp: 3 mL, Rfl: 11    albuterol (PROVENTIL/VENTOLIN HFA) 90 mcg/actuation inhaler, Inhale 2 puffs into the lungs every 6 (six) hours as needed for Wheezing or Shortness of Breath. Rescue, Disp: 54 g, Rfl: 6    albuterol-ipratropium (DUO-NEB) 2.5 mg-0.5 mg/3 mL nebulizer solution, Take 3 mLs by nebulization every 6 (six) hours as needed for Wheezing. Rescue (Patient not taking: Reported on 7/13/2022), Disp: 75 mL, Rfl: 0    alcohol swabs (BD ALCOHOL SWABS) PadM, Apply 1 each topically 2 (two) times a day., Disp: 200 each, Rfl: 4    amitriptyline (ELAVIL) 10 MG tablet, Take 1 tablet (10 mg total) by mouth nightly as needed for Insomnia., Disp: 30 tablet, Rfl: 2     ammonium lactate 12 % Crea, Apply twice daily to affected parts both feet as needed., Disp: 140 g, Rfl: 11    azelastine (ASTELIN) 137 mcg (0.1 %) nasal spray, 2 sprays (274 mcg total) by Nasal route 2 (two) times daily., Disp: 30 mL, Rfl: 11    blood sugar diagnostic Strp, 1 each by Misc.(Non-Drug; Combo Route) route 4 (four) times daily before meals and nightly. ACCU CHEK ELVIA PLUS METER, Disp: 200 each, Rfl: 3    blood-glucose meter (ACCU-CHEK ELVIA PLUS METER) Misc, TEST FOUR TIMES DAILY BEFORE MEALS AND EVERY NIGHT, Disp: 90 each, Rfl: 1    budesonide-formoterol 160-4.5 mcg (SYMBICORT) 160-4.5 mcg/actuation HFAA, Inhale 2 puffs into the lungs every 12 (twelve) hours. Controller, Disp: 10.2 g, Rfl: 11    cetirizine (ZYRTEC) 10 MG tablet, Take 1 tablet (10 mg total) by mouth daily as needed for Allergies (itching)., Disp: 30 tablet, Rfl: 0    clobetasoL (TEMOVATE) 0.05 % external solution, Apply topically 2 (two) times daily. Prn scalp itch or rash, Disp: 60 mL, Rfl: 1    diclofenac sodium (VOLTAREN) 1 % Gel, Apply 2 g topically 3 (three) times daily. Apply to painful joints, Disp: 300 g, Rfl: 2    empagliflozin (JARDIANCE) 10 mg tablet, Take 1 tablet (10 mg total) by mouth once daily., Disp: 90 tablet, Rfl: 1    EPINEPHrine (EPIPEN) 0.3 mg/0.3 mL AtIn, Inject 0.3 mLs (0.3 mg total) into the muscle once. for 1 dose, Disp: 2 each, Rfl: 1    estradioL (ESTRACE) 0.01 % (0.1 mg/gram) vaginal cream, Use 1 gram of estrogen cream in vagina nightly x 2 weeks, then twice a week thereafter. (Patient not taking: Reported on 7/13/2022), Disp: 42.5 g, Rfl: 3    fluticasone propionate (FLONASE) 50 mcg/actuation nasal spray, 2 sprays (100 mcg total) by Each Nostril route 2 (two) times a day., Disp: 16 g, Rfl: 11    lancets (ACCU-CHEK FASTCLIX LANCET DRUM) Misc, 1 Device by Misc.(Non-Drug; Combo Route) route 2 (two) times a day., Disp: 200 each, Rfl: 4    lancets (ACCU-CHEK SOFTCLIX LANCETS) Misc, 1 Device by Misc.(Non-Drug;  Combo Route) route 4 (four) times daily., Disp: 200 each, Rfl: 3    levocetirizine (XYZAL) 5 MG tablet, Take 1 tablet (5 mg total) by mouth every evening., Disp: 90 tablet, Rfl: 3    lidocaine (LIDODERM) 5 %, Place 1 patch onto the skin once daily. Remove & Discard patch within 12 hours or as directed by MD, Disp: 15 patch, Rfl: 0    LIDOcaine HCL 2% (XYLOCAINE) 2 % jelly, Apply topically as needed. Apply topically once nightly to affected part of foot/feet., Disp: 30 mL, Rfl: 2    LINZESS 290 mcg Cap capsule, Take 290 mcg by mouth once daily., Disp: , Rfl:     losartan (COZAAR) 100 MG tablet, Take 1 tablet (100 mg total) by mouth once daily., Disp: 90 tablet, Rfl: 0    montelukast (SINGULAIR) 10 mg tablet, Take 1 tablet (10 mg total) by mouth every evening., Disp: 90 tablet, Rfl: 1    MOVANTIK 12.5 mg Tab, Take 1 tablet by mouth once daily., Disp: , Rfl:     nicotine (NICODERM CQ) 21 mg/24 hr, Place 1 patch onto the skin once daily., Disp: 28 patch, Rfl: 0    nystatin-triamcinolone (MYCOLOG) ointment, Apply topically 2 (two) times daily., Disp: 60 g, Rfl: 2    ondansetron (ZOFRAN) 8 MG tablet, Take 1 tablet (8 mg total) by mouth every 8 (eight) hours as needed for Nausea., Disp: 90 tablet, Rfl: 11    oxyCODONE-acetaminophen (PERCOCET) 7.5-325 mg per tablet, Take 1 tablet by mouth 3 (three) times daily as needed for Pain., Disp: 90 tablet, Rfl: 0    pantoprazole (PROTONIX) 40 MG tablet, TAKE 1 TABLET(40 MG) BY MOUTH TWICE DAILY, Disp: 180 tablet, Rfl: 0    pantoprazole (PROTONIX) 40 MG tablet, TAKE 1 TABLET(40 MG) BY MOUTH TWICE DAILY (Patient not taking: Reported on 7/13/2022), Disp: 180 tablet, Rfl: 0    pregabalin (LYRICA) 50 MG capsule, Take 1 capsule (50 mg total) by mouth 3 (three) times daily., Disp: 90 capsule, Rfl: 3    QUEtiapine (SEROQUEL) 25 MG Tab, Take 1 tablet (25 mg total) by mouth once daily., Disp: 90 tablet, Rfl: 0    semaglutide 2 mg/dose (8 mg/3 mL) PnIj, Inject 2 mg into the skin every 7 days.,  "Disp: 9 mL, Rfl: 0    sodium chloride (SALINE NASAL) 0.65 % nasal spray, 2 sprays by Nasal route as needed for Congestion. (Patient not taking: Reported on 7/13/2022), Disp: 104 mL, Rfl: 12    sulindac (CLINORIL) 200 MG Tab, Take 1 tablet (200 mg total) by mouth 2 (two) times daily., Disp: 30 tablet, Rfl: 0    topiramate (TOPAMAX) 50 MG tablet, Take 2 tablets (100 mg total) by mouth every evening., Disp: 180 tablet, Rfl: 3    triamcinolone acetonide 0.1% (KENALOG) 0.1 % cream, Apply topically 2 (two) times daily. Prn focally for rash spots on forehead and legs.Stop using steroid topical when skin is smooth and non itchy.  Do not treat dark or red coloring., Disp: 45 g, Rfl: 0    venlafaxine (EFFEXOR-XR) 75 MG 24 hr capsule, Take 1 capsule (75 mg total) by mouth once daily. NO FURTHER REFILL WITHOUT APT, Disp: 90 capsule, Rfl: 1         Review of Systems as per HPI  Review of Systems   Constitutional:  Positive for chills and fever. Negative for malaise/fatigue and weight loss.   Respiratory:  Positive for cough (dry), shortness of breath and wheezing. Negative for hemoptysis.    Gastrointestinal:  Negative for abdominal pain, diarrhea, nausea and vomiting.   Musculoskeletal:  Positive for myalgias. Negative for falls, joint pain and neck pain.   Neurological:  Negative for dizziness, tingling, tremors, sensory change, speech change, focal weakness, seizures, loss of consciousness, weakness and headaches.   All other systems reviewed and are negative.      Physical Exam     ED Triage Vitals [09/08/22 1250]   Enc Vitals Group      BP (!) 193/92      Pulse 108      Resp (!) 22      Temp (!) 100.7 °F (38.2 °C)      Temp src Oral      SpO2 99 %      Weight       Height 5' 4"      Head Circumference       Peak Flow       Pain Score       Pain Loc       Pain Edu?       Excl. in GC?        Vital signs and nursing assessment noted:  elevated BP, fever    GEN:   NAD, A & Ox3, atraumatic, Overweight, nontoxic appearing, feels " warm to the touch.  HEENT:  PERRLA, EOMI, moist membranes, nl conjunctiva, no scleral icterus, no nystagmus, no nodes/nodules, soft, supple, FROM, no trachial deviation  CV:   RRR no m/r/g, 2+ radial pulses, <2sec cap refill, no obvious JVD  Physical Exam  Pulmonary:      Effort: Tachypnea and respiratory distress present. No bradypnea, accessory muscle usage, prolonged expiration or retractions.      Breath sounds: No stridor, decreased air movement or transmitted upper airway sounds. Wheezing present. No decreased breath sounds, rhonchi or rales.     ABD:   soft, Nontender, Nondistended, +BS, no guarding/rebound  :   Deferred  BACK:  FROM, no midline tenderness, no paraspinal tenderness  EXT:   FROM, FERNÁNDEZ x 4, no swelling, no edema, no calf tenderness, no bony tenderness, no warmth or redness, no crepitus, no obvious deformity  LYMPH:  no gross adenopathy  NEURO:  GCS 15, CN II-XII grossly intact, no obvious motor/sensory deficit, no tremor, negative Romberg,  nl gait/coordination  PSYCH:   no SI/HI, no anxiety, nl mood/affect, nl judgement/thought process  SKIN:  Warm, dry, intact, no rashes/lesions or masses, nl color, no pallor      Tests     Labs Reviewed   CBC W/ AUTO DIFFERENTIAL - Abnormal; Notable for the following components:       Result Value    WBC 3.70 (*)     RDW 14.6 (*)     MPV 8.6 (*)     Immature Granulocytes 1.1 (*)     Lymph # 0.4 (*)     Lymph % 11.4 (*)     Mono % 18.1 (*)     All other components within normal limits   COMPREHENSIVE METABOLIC PANEL - Abnormal; Notable for the following components:    Albumin 3.4 (*)     Anion Gap 5 (*)     All other components within normal limits   SARS-COV-2 RDRP GENE - Abnormal; Notable for the following components:    POC Rapid COVID Positive (*)     All other components within normal limits   CULTURE, BLOOD   CULTURE, BLOOD   LACTIC ACID, PLASMA   URINALYSIS, REFLEX TO URINE CULTURE     X-Ray Chest AP Portable   Final Result      No focal  consolidation.      Borderline cardiomegaly with prominent interstitial markings.  The findings may reflect early pulmonary edema secondary to CHF.         Electronically signed by: Aydin Patterson MD   Date:    09/08/2022   Time:    14:03          EKG Reviewed and independently interpreted:  No STEMI  NSR with HR 90  Nl conduction, nl intervals  Nl ST and T wave   No ectopy, Nl axis    Critical Care    Date/Time: 9/8/2022 3:19 PM  Performed by: Meron Tripp MD  Authorized by: Meron Tripp MD   Total critical care time (exclusive of procedural time) : 0 minutes  Critical care time was exclusive of separately billable procedures and treating other patients.  Critical care was necessary to treat or prevent imminent or life-threatening deterioration of the following conditions: sepsis and respiratory failure.  Critical care was time spent personally by me on the following activities: blood draw for specimens, development of treatment plan with patient or surrogate, evaluation of patient's response to treatment, examination of patient, obtaining history from patient or surrogate, ordering and performing treatments and interventions, ordering and review of laboratory studies, ordering and review of radiographic studies, pulse oximetry, re-evaluation of patient's condition and review of old charts.  Comments: Documenting in medical chart        MEDICAL DECISION MAKING:  History supplemented by old records which were checked/reviewed in EMR:  Patient evaluated in July 2022 for chronic rhinosinusitus and asthma     A 52 y.o. female with a pertinent PMHx of anemia, asthma/COPD, chronic rhinosinusitus, DM, GERD, high cholesterol, HTN, obesity, and respiratory failure, presents to the ED for evaluation of constant shortness of breath that began 2-3 days ago.    Exam shows that the patient is overweight, wheezing, and feels warm to the touch.    Shortness of breath differential includes but not exclusive to   asthma/COPD exacerbation, deconditioning, myocardial infarction, pneumonia, CHF, pneumothorax.  Unlikely pulmonary embolism, anemia, metabolic disturbances.     Treatment plan includes history, physical exam, cardiac monitoring, labs, imaging studies, and supportive care.      ED Course     MDM  Reviewed: previous chart, nursing note and vitals  Reviewed previous: labs, ECG and x-ray  Interpretation: labs, ECG and x-ray  Total time providing critical care: 30-74 minutes. This excludes time spent performing separately reportable procedures and services.  Consults: admitting MD     ED Course as of 09/08/22 1746   Thu Sep 08, 2022   1314 BP(!): 140/85  Improved from triage vitals [NO]   1357 X-Ray Chest AP Portable  Cardiomegaly, peribronchial thickening [NO]   1407 POCT COVID-19 Rapid Screening  Abnormal as positive [NO]   1408 WBC(!): 3.70  low [NO]   1408 Hemoglobin: 12.4  unremarkable [NO]   1512 SpO2: 99 %  Interpreted as normal by emergency medicine physician.  Interventions none.   [NO]   1512 Albumin(!): 3.4  Relatively unremarkable CMP [NO]   1512 Lactate, Jerrell: 0.6  Unremarkable [NO]   1513 Difficulty secondary to concerns of pulmonary edema. [NO]   1524 Persistent dyspnea and/or wheezing AFTER treatment with short-acting beta-agonist [NO]   1621 Hospital medicine aware of admission [NO]   1650 Leukocytes, UA: Negative  Unremarkable urinalysis [NO]      ED Course User Index  [NO] Meron Tripp MD     REASSESSMENT: Patient is tolerating p.o. and ambulating with steady gait.   Sx improved after treatment with:   Medications   magnesium sulfate in dextrose IVPB (premix) 1 g (1 g Intravenous New Bag 9/8/22 1446)   albuterol inhaler 2 puff (2 puffs Inhalation Given 9/8/22 1417)   acetaminophen tablet 1,000 mg (1,000 mg Oral Given 9/8/22 1404)   dexamethasone injection 4 mg (4 mg Intravenous Given 9/8/22 1446)      The results of physical exam, lab and imaging findings were reviewed with the patient. This  discussion included but not exclusive to the risk to the patient due to the potential underlying pathology, the testing that was required to make the diagnosis, and the treatment administered.  The patient is amenable to treatment plan, assessment and disposition.  She is okay with admission.  All questions answered.    Clinical impression:   1. COVID-19 virus infection    2. Tachycardia    3. Asthma exacerbation with COPD (chronic obstructive pulmonary disease)    4. COVID-19    5. Chest pain            Disposition:  Admitted to telemetry under stable condition    SCRIBE #1 NOTE: I, Shelley Holm, am scribing for, and in the presence of,  Meron Tripp MD. I have scribed the following portions of the note - Other sections scribed: HPI, ROS, PE, MDM.     I, Dr. Meron Tripp, personally performed the services described in this documentation. All medical record entries made by the scribe were at my direction and in my presence.  I have reviewed the chart and agree that the record reflects my personal performance and is accurate and complete. Meron Tripp MD.             Meron Tripp MD  09/08/22 0691

## 2022-09-08 NOTE — ASSESSMENT & PLAN NOTE
Pt with h/o asthma and copd who presents with covid and evidence of asthma exacerbation with tachypnea and wheezing. Appears improved since tx in ed, but pt continues to wheeze  Pt did have fever on vs review, but appears likely related to covid    -restart home inhalers  -started decadron in setting of covid  -monitor o2 sats and start o2 supplementation if needed  -procal pending to ensure not underlying bacterial pna although seems unlikely.

## 2022-09-08 NOTE — HPI
52F with pmh of asthma, tobacco use, dm, htn presents for 4 day h/o worsening sob and cough with associated wheezing.  Patient patient states that she started feeling bad on Monday and if in progressively worsening until her symptoms brought her to the ED on 09/08.  Patient states currently her shortness of breath is improved, but she still has a nonproductive cough.  Patient states she feels like she needs to get something up but it is not coming up currently.  Patient denies any known sick contacts, but does take care of her small grandchildren he just started back at school.  Patient has also been having some nausea, but denies vomiting.  Patient has been using her home inhalers without relief of her symptoms.  Patient is a current smoker of about 1/2 pack per day, but denies alcohol or drug use.  Patient would like to be a full code.

## 2022-09-08 NOTE — H&P
[facility] Emergency Dept  [facility]  History & Physical    Patient Name: Yanni Kaiser  MRN: [id number]  Patient Class: IP- Inpatient  Admission Date: 9/8/2022  Attending Physician: Robert Cortes III, MD   Primary Care Provider: Carlyle Gordillo MD         Patient information was obtained from patient, spouse/SO and ER records.     Subjective:     Principal Problem:COVID    Chief Complaint:   Chief Complaint   Patient presents with    Shortness of Breath     51 yo female to triage for SOB x 2 days. Pt has Hx of asthma. Says she has tried inhalers but, is getting worst. Pt could barely talk in triage due to the SOB, Audible wheezing noted.      Hypertension        HPI: 52F with pmh of asthma, tobacco use, dm, htn presents for 4 day h/o worsening sob and cough with associated wheezing.  Patient patient states that she started feeling bad on Monday and if in progressively worsening until her symptoms brought her to the ED on 09/08.  Patient states currently her shortness of breath is improved, but she still has a nonproductive cough.  Patient states she feels like she needs to get something up but it is not coming up currently.  Patient denies any known sick contacts, but does take care of her small grandchildren he just started back at school.  Patient has also been having some nausea, but denies vomiting.  Patient has been using her home inhalers without relief of her symptoms.  Patient is a current smoker of about 1/2 pack per day, but denies alcohol or drug use.  Patient would like to be a full code.      Past Medical History:   Diagnosis Date    Allergy     Anemia     Anxiety     Asthma     Back pain     Chronic bronchitis     Chronic obstructive pulmonary disease, unspecified COPD type 4/1/2022    Cigarette smoker     DDD (degenerative disc disease), lumbar 6/9/2020    Depression     Diabetes mellitus with peripheral circulatory disorder 4/1/2022    Diabetes with neurologic  complications     DUB (dysfunctional uterine bleeding) 10/16/2018    GERD (gastroesophageal reflux disease)     High cholesterol     Hyperprolactinemia     Hypertension     Influenza A 2020    Neuromuscular disorder     Obese body habitus     Obesity     Pseudotumor cerebri     Renal manifestation of secondary diabetes mellitus     Respiratory failure     Seizures     Simple endometrial hyperplasia     Sleep apnea     Smoker     Tobacco dependence     Urinary incontinence        Past Surgical History:   Procedure Laterality Date    ANTROSTOMY OF MAXILLARY SINUS AT INFERIOR NASAL MEATUS  10/22/2021    Procedure: MAXILLARY ANTROSTOMY, AT INFERIOR NASAL MEATUS;  Surgeon: John Prado III, MD;  Location: [facility] [place] OR;  Service: ENT;;    BREAST CYST ASPIRATION       SECTION      X 1    CHOLECYSTECTOMY      COLONOSCOPY      COLONOSCOPY N/A 2019    Procedure: COLONOSCOPY;  Surgeon: Jagruti Sweeney MD;  [facility]  Service: Endoscopy;  Laterality: N/A;  BMI is 63/gastroparesis/3 days full liquid diet 1 day clear liquid see telephone encounter by Ciara Brown     EPIDURAL STEROID INJECTION N/A 2020    Procedure: INJECTION, STEROID, EPIDURAL, L5-S1 IL;  Surgeon: Lawrence Marin MD;  Location: [facility] [place] PAIN MGT;  Service: Pain Management;  Laterality: N/A;    ESOPHAGEAL MANOMETRY WITH MEASUREMENT OF IMPEDANCE N/A 2019    Procedure: MANOMETRY-ESOPHAGEAL-WITH IMPEDANCE;  Surgeon: Jagruti Sweeney MD;  [facility]  Service: Endoscopy;  Laterality: N/A;  Hold Narcotics x 1 days   Hold TCA x 1 days  Propofol only. No fentanyl or benzodiazepine during sedation. If additional sedation needed, discuss with Dr. Sweeney.  10/29 - pt confirmed appt    ESOPHAGOGASTRODUODENOSCOPY N/A 2019    Procedure: EGD (ESOPHAGOGASTRODUODENOSCOPY);  Surgeon: Jagruti Sweeney MD;  Location: [facility] (2ND FLR);  Service: Endoscopy;  Laterality: N/A;   BMI is 63/gastroparesis/3 days full liquid diet 1 day clear liquid see telephone encounter by Ciara Pinto -     ESOPHAGOGASTRODUODENOSCOPY N/A 11/5/2019    Procedure: ESOPHAGOGASTRODUODENOSCOPY (EGD);  Surgeon: Jagruti Sweeney MD;  Location: [facility] (2ND FLR);  Service: Endoscopy;  Laterality: N/A;  EGD with EndoFlip /+/- Botox  2nd floor for gastroparesis/BMI 63 **367lbs**  Bariatric Stretcher needed  Manometry probe to be placed endoscopically during EGD procedure  Due to change in protocol, Full liquid diet for 3 days/ 1 day of clear liquids  Hi    ESOPHAGOGASTRODUODENOSCOPY N/A 12/14/2020    Procedure: ESOPHAGOGASTRODUODENOSCOPY (EGD) with BRAVO;  Surgeon: Jagruti Sweeney MD;  Location: [facility] (2ND FLR);  Service: Endoscopy;  Laterality: N/A;  with Dilation  2nd floor due to BMI 56.20 (327 lbs) and gastroparesis  3 days full liquid diet and 1 day clears    ESOPHAGOGASTRODUODENOSCOPY N/A 4/15/2021    Procedure: ESOPHAGOGASTRODUODENOSCOPY (EGD);  Surgeon: Jagruti Sweeney MD;  Location: [facility] (2ND FLR);  Service: Endoscopy;  Laterality: N/A;  Endoflip  2nd floor-gastroparesis, BMI 57.18, bariatric stretcher  full liquid diet x3 days, clear liquid diet x1 day prior to procedure  propofol only  covid test 4/12 primary care, instructions emailed-hospitals    FOOT FRACTURE SURGERY Left     FUNCTIONAL ENDOSCOPIC SINUS SURGERY (FESS) USING COMPUTER-ASSISTED NAVIGATION Bilateral 10/22/2021    Procedure: FESS, USING COMPUTER-ASSISTED NAVIGATION;  Surgeon: John Prado III, MD;  Location: [facility] OR;  Service: ENT;  Laterality: Bilateral;  FOLLOW DR PRADO ANESTHESIA PROTOCAL / pt will stay 23 hour post surgery for SHARATH observation    HYSTEROSCOPY WITH DILATION AND CURETTAGE OF UTERUS N/A 10/16/2018    K[facility]    INJECTION OF ANESTHETIC AGENT AROUND NERVE Bilateral 10/23/2020    Procedure: BLOCK, NERVE  bilateral L3,4,5 MBB;  Surgeon: Lawrence Marin MD;  Location: [facility] PAIN MGT;  Service: Pain Management;   Laterality: Bilateral;  bilateral L3,4,5 MBB   consent needed    INJECTION OF ANESTHETIC AGENT AROUND NERVE Bilateral 2/18/2022    Procedure: BLOCK, NERVE, L3-L4-L5 MEDIAL BRANCH;  Surgeon: Lawrence Marin MD;  Location: [facility] PAIN MGT;  Service: Pain Management;  Laterality: Bilateral;    PLANTAR FASCIOTOMY Left 11/18/2019    Procedure: FASCIOTOMY, PLANTAR;  Surgeon: Valentino Oorurke DPM;  Location: [facility] OR [address] [place] [address];  Service: Podiatry;  Laterality: Left;    RETINAL LASER PROCEDURE Left     SINUS SURGERY      SPHENOIDECTOMY Bilateral 10/22/2021    Procedure: SPHENOIDECTOMY;  Surgeon: John Prado III, MD;  Location: [facility] OR;  Service: ENT;  Laterality: Bilateral;    TRANSFORAMINAL EPIDURAL INJECTION OF STEROID Bilateral 6/24/2020    Procedure: INJECTION, STEROID, EPIDURAL, TRANSFORAMINAL APPROACH;  Surgeon: Lawrence Marin MD;  Location: [facility] PAIN MGT;  Service: Pain Management;  Laterality: Bilateral;    TRANSFORAMINAL EPIDURAL INJECTION OF STEROID Bilateral 1/28/2022    Procedure: INJECTION, STEROID, EPIDURAL, TRANSFORAMINAL APPROACH  Bilateral TFESI L4/L5      NEEDS CONSENT;  Surgeon: Lawrence Marin MD;  Location: [facility] PAIN MGT;  Service: Pain Management;  Laterality: Bilateral;    UPPER GASTROINTESTINAL ENDOSCOPY         Review of patient's allergies indicates:   Allergen Reactions    Gabapentin Other (See Comments)     Makes her twitch       Current Facility-Administered Medications on File Prior to Encounter   Medication    albuterol inhaler 2 puff    diphenhydrAMINE injection 25 mg    EPINEPHrine (EPIPEN) 0.3 mg/0.3 mL pen injection 0.3 mg    methylPREDNISolone sodium succinate injection 40 mg    ondansetron disintegrating tablet 4 mg    sodium chloride 0.9% 500 mL flush bag    sodium chloride 0.9% flush 10 mL     Current Outpatient Medications on File Prior to Encounter   Medication Sig    albuterol (PROVENTIL) 2.5 mg /3 mL (0.083 %) nebulizer solution Take 3 mLs (2.5 mg total) by  nebulization every 6 (six) hours as needed for Wheezing. Rescue    albuterol (PROVENTIL/VENTOLIN HFA) 90 mcg/actuation inhaler Inhale 2 puffs into the lungs every 6 (six) hours as needed for Wheezing or Shortness of Breath. Rescue    albuterol-ipratropium (DUO-NEB) 2.5 mg-0.5 mg/3 mL nebulizer solution Take 3 mLs by nebulization every 6 (six) hours as needed for Wheezing. Rescue (Patient not taking: Reported on 7/13/2022)    alcohol swabs (BD ALCOHOL SWABS) PadM Apply 1 each topically 2 (two) times a day.    amitriptyline (ELAVIL) 10 MG tablet Take 1 tablet (10 mg total) by mouth nightly as needed for Insomnia.    ammonium lactate 12 % Crea Apply twice daily to affected parts both feet as needed.    azelastine (ASTELIN) 137 mcg (0.1 %) nasal spray 2 sprays (274 mcg total) by Nasal route 2 (two) times daily.    blood sugar diagnostic Strp 1 each by Misc.(Non-Drug; Combo Route) route 4 (four) times daily before meals and nightly. ACCU CHEK ELVIA PLUS METER    blood-glucose meter (ACCU-CHEK ELVIA PLUS METER) Misc TEST FOUR TIMES DAILY BEFORE MEALS AND EVERY NIGHT    budesonide-formoterol 160-4.5 mcg (SYMBICORT) 160-4.5 mcg/actuation HFAA Inhale 2 puffs into the lungs every 12 (twelve) hours. Controller    cetirizine (ZYRTEC) 10 MG tablet Take 1 tablet (10 mg total) by mouth daily as needed for Allergies (itching).    clobetasoL (TEMOVATE) 0.05 % external solution Apply topically 2 (two) times daily. Prn scalp itch or rash    diclofenac sodium (VOLTAREN) 1 % Gel Apply 2 g topically 3 (three) times daily. Apply to painful joints    empagliflozin (JARDIANCE) 10 mg tablet Take 1 tablet (10 mg total) by mouth once daily.    EPINEPHrine (EPIPEN) 0.3 mg/0.3 mL AtIn Inject 0.3 mLs (0.3 mg total) into the muscle once. for 1 dose    estradioL (ESTRACE) 0.01 % (0.1 mg/gram) vaginal cream Use 1 gram of estrogen cream in vagina nightly x 2 weeks, then twice a week thereafter. (Patient not taking: Reported on  7/13/2022)    fluticasone propionate (FLONASE) 50 mcg/actuation nasal spray 2 sprays (100 mcg total) by Each Nostril route 2 (two) times a day.    lancets (ACCU-CHEK FASTCLIX LANCET DRUM) Misc 1 Device by Misc.(Non-Drug; Combo Route) route 2 (two) times a day.    lancets (ACCU-CHEK SOFTCLIX LANCETS) Misc 1 Device by Misc.(Non-Drug; Combo Route) route 4 (four) times daily.    levocetirizine (XYZAL) 5 MG tablet Take 1 tablet (5 mg total) by mouth every evening.    lidocaine (LIDODERM) 5 % Place 1 patch onto the skin once daily. Remove & Discard patch within 12 hours or as directed by MD    LIDOcaine HCL 2% (XYLOCAINE) 2 % jelly Apply topically as needed. Apply topically once nightly to affected part of foot/feet.    LINZESS 290 mcg Cap capsule Take 290 mcg by mouth once daily.    losartan (COZAAR) 100 MG tablet Take 1 tablet (100 mg total) by mouth once daily.    montelukast (SINGULAIR) 10 mg tablet Take 1 tablet (10 mg total) by mouth every evening.    MOVANTIK 12.5 mg Tab Take 1 tablet by mouth once daily.    nicotine (NICODERM CQ) 21 mg/24 hr Place 1 patch onto the skin once daily.    nystatin-triamcinolone (MYCOLOG) ointment Apply topically 2 (two) times daily.    ondansetron (ZOFRAN) 8 MG tablet Take 1 tablet (8 mg total) by mouth every 8 (eight) hours as needed for Nausea.    oxyCODONE-acetaminophen (PERCOCET) 7.5-325 mg per tablet Take 1 tablet by mouth 3 (three) times daily as needed for Pain.    pantoprazole (PROTONIX) 40 MG tablet TAKE 1 TABLET(40 MG) BY MOUTH TWICE DAILY    pantoprazole (PROTONIX) 40 MG tablet TAKE 1 TABLET(40 MG) BY MOUTH TWICE DAILY (Patient not taking: Reported on 7/13/2022)    pregabalin (LYRICA) 50 MG capsule Take 1 capsule (50 mg total) by mouth 3 (three) times daily.    QUEtiapine (SEROQUEL) 25 MG Tab Take 1 tablet (25 mg total) by mouth once daily.    semaglutide 2 mg/dose (8 mg/3 mL) PnIj Inject 2 mg into the skin every 7 days.    sodium chloride (SALINE NASAL)  0.65 % nasal spray 2 sprays by Nasal route as needed for Congestion. (Patient not taking: Reported on 2022)    sulindac (CLINORIL) 200 MG Tab Take 1 tablet (200 mg total) by mouth 2 (two) times daily.    topiramate (TOPAMAX) 50 MG tablet Take 2 tablets (100 mg total) by mouth every evening.    triamcinolone acetonide 0.1% (KENALOG) 0.1 % cream Apply topically 2 (two) times daily. Prn focally for rash spots on forehead and legs.Stop using steroid topical when skin is smooth and non itchy.  Do not treat dark or red coloring.    venlafaxine (EFFEXOR-XR) 75 MG 24 hr capsule Take 1 capsule (75 mg total) by mouth once daily. NO FURTHER REFILL WITHOUT APT     Family History       Problem Relation (Age of Onset)    Breast cancer Maternal Grandmother    COPD Father    Cancer Maternal Grandmother    Cataracts Mother, Father    Colon cancer Mother (50), Maternal Grandmother    Colon polyps Mother, Maternal Grandmother    Diabetes Mother    Glaucoma Father    Heart attack Father    Heart disease Father    Hypertension Mother, Father    No Known Problems Paternal Grandmother, Brother, Maternal Aunt, Maternal Uncle, Paternal Aunt, Paternal Uncle, Maternal Grandfather, Paternal Grandfather, Daughter, Son, Daughter, Daughter    Ulcers Father          Tobacco Use    Smoking status: Former     Packs/day: 1.00     Years: 27.00     Pack years: 27.00     Types: Cigarettes     Start date: 1992     Quit date: 2022     Years since quittin.2    Smokeless tobacco: Never    Tobacco comments:     1/2 a pack if her days are good   Substance and Sexual Activity    Alcohol use: Not Currently     Alcohol/week: 0.0 standard drinks     Comment: socially    Drug use: No    Sexual activity: Yes     Partners: Male     Birth control/protection: None     Comment:      Review of Systems   Constitutional:  Positive for chills, fatigue and fever.   HENT:  Negative for congestion and rhinorrhea.    Eyes:  Negative for  photophobia and pain.   Respiratory:  Positive for cough, shortness of breath and wheezing.    Cardiovascular:  Negative for chest pain, palpitations and leg swelling.   Gastrointestinal:  Positive for nausea. Negative for abdominal pain and vomiting.   Endocrine: Negative for polydipsia and polyuria.   Genitourinary:  Negative for dysuria and hematuria.   Musculoskeletal:  Positive for back pain (chronic) and neck pain.   Skin:  Negative for rash and wound.   Neurological:  Negative for weakness, numbness and headaches.   All other systems reviewed and are negative.  Objective:     Vital Signs (Most Recent):  Temp: (!) 100.7 °F (38.2 °C) (09/08/22 1250)  Pulse: 83 (09/08/22 1602)  Resp: 18 (09/08/22 1602)  BP: (!) 155/88 (09/08/22 1602)  SpO2: 100 % (09/08/22 1602)   Vital Signs (24h Range):  Temp:  [100.7 °F (38.2 °C)] 100.7 °F (38.2 °C)  Pulse:  [] 83  Resp:  [16-47] 18  SpO2:  [97 %-100 %] 100 %  BP: (140-193)/(72-95) 155/88        Body mass index is 62.33 kg/m².    Physical Exam  Vitals reviewed.   Constitutional:       General: She is not in acute distress.     Appearance: She is obese. She is not ill-appearing.   HENT:      Head: Normocephalic and atraumatic.      Nose: No congestion or rhinorrhea.      Mouth/Throat:      Mouth: Mucous membranes are moist.      Pharynx: Oropharynx is clear.   Eyes:      General: No scleral icterus.     Extraocular Movements: Extraocular movements intact.   Cardiovascular:      Rate and Rhythm: Normal rate and regular rhythm.   Pulmonary:      Effort: Pulmonary effort is normal. No respiratory distress (no tripoding or accessory muscle use.).      Breath sounds: Wheezing (mild bilateral wheezing noted.) present.   Abdominal:      General: There is no distension.      Palpations: Abdomen is soft.      Tenderness: There is no abdominal tenderness.   Musculoskeletal:         General: No swelling or deformity.      Cervical back: Neck supple. No rigidity.   Skin:      General: Skin is warm and dry.   Neurological:      General: No focal deficit present.      Mental Status: She is alert and oriented to person, place, and time.   Psychiatric:         Mood and Affect: Mood normal.         Behavior: Behavior normal.           Significant Labs: All pertinent labs within the past 24 hours have been reviewed.    Significant Imaging: I have reviewed all pertinent imaging results/findings within the past 24 hours.    Assessment/Plan:     Asthma exacerbation in COPD  Pt with h/o asthma and copd who presents with covid and evidence of asthma exacerbation with tachypnea and wheezing. Appears improved since tx in ed, but pt continues to wheeze  Pt did have fever on vs review, but appears likely related to covid    -restart home inhalers  -started decadron in setting of covid  -monitor o2 sats and start o2 supplementation if needed  -procal pending to ensure not underlying bacterial pna although seems unlikely.    Diabetes mellitus with peripheral circulatory disorder  Patient's FSGs are controlled on current medication regimen.  Last A1c reviewed-   Lab Results   Component Value Date    HGBA1C 6.4 (H) 06/20/2022     Most recent fingerstick glucose reviewed- No results for input(s): POCTGLUCOSE in the last 24 hours.  Current correctional scale  Low  Maintain anti-hyperglycemic dose as follows-   Antihyperglycemics (From admission, onward)    Start     Stop Route Frequency Ordered    09/08/22 1820  insulin aspart U-100 pen 0-5 Units  (INSULIN - LOW CORRECTION DOSE (Recommended for BMI <25, GFR<15 or on dialysis, Age > 70, type 1 diabetes, ESLD, severe CHF or patients on <70 units of insulin daily))         -- SubQ Before meals & nightly PRN 09/08/22 1721        Hold Oral hypoglycemics while patient is in the hospital.    Chronic pain disorder  Restarted home medications      Epilepsy  Restarted home meds      GERD (gastroesophageal reflux disease)  Restart home  protonix        Hyperlipidemia  Restart home medications      Essential hypertension  Currently controlled  -Restart home medications      Cigarette nicotine dependence without complication  >3 minutes of smoking cessation counseling given.      VTE Risk Mitigation (From admission, onward)         Ordered     heparin (porcine) injection 5,000 Units  Every 8 hours         09/08/22 1711     IP VTE HIGH RISK PATIENT  Once         09/08/22 1711     Place sequential compression device  Until discontinued         09/08/22 1711                   Robert Cortes III, MD  Department of Hospital Medicine   [facility] - Emergency Dept

## 2022-09-08 NOTE — ED TRIAGE NOTES
Per pt she has had SOB, and wheezing X 2days reports new onset chills and body aches x today. Pt presents with audial wheezing

## 2022-09-08 NOTE — ASSESSMENT & PLAN NOTE
Patient's FSGs are controlled on current medication regimen.  Last A1c reviewed-   Lab Results   Component Value Date    HGBA1C 6.4 (H) 06/20/2022     Most recent fingerstick glucose reviewed- No results for input(s): POCTGLUCOSE in the last 24 hours.  Current correctional scale  Low  Maintain anti-hyperglycemic dose as follows-   Antihyperglycemics (From admission, onward)    Start     Stop Route Frequency Ordered    09/08/22 1820  insulin aspart U-100 pen 0-5 Units  (INSULIN - LOW CORRECTION DOSE (Recommended for BMI <25, GFR<15 or on dialysis, Age > 70, type 1 diabetes, ESLD, severe CHF or patients on <70 units of insulin daily))         -- SubQ Before meals & nightly PRN 09/08/22 1721        Hold Oral hypoglycemics while patient is in the hospital.

## 2022-09-08 NOTE — SUBJECTIVE & OBJECTIVE
Past Medical History:   Diagnosis Date    Allergy     Anemia     Anxiety     Asthma     Back pain     Chronic bronchitis     Chronic obstructive pulmonary disease, unspecified COPD type 2022    Cigarette smoker     DDD (degenerative disc disease), lumbar 2020    Depression     Diabetes mellitus with peripheral circulatory disorder 2022    Diabetes with neurologic complications     DUB (dysfunctional uterine bleeding) 10/16/2018    GERD (gastroesophageal reflux disease)     High cholesterol     Hyperprolactinemia     Hypertension     Influenza A 2020    Neuromuscular disorder     Obese body habitus     Obesity     Pseudotumor cerebri     Renal manifestation of secondary diabetes mellitus     Respiratory failure     Seizures     Simple endometrial hyperplasia     Sleep apnea     Smoker     Tobacco dependence     Urinary incontinence        Past Surgical History:   Procedure Laterality Date    ANTROSTOMY OF MAXILLARY SINUS AT INFERIOR NASAL MEATUS  10/22/2021    Procedure: MAXILLARY ANTROSTOMY, AT INFERIOR NASAL MEATUS;  Surgeon: John Prado III, MD;  Location: [facility] OR;  Service: ENT;;    BREAST CYST ASPIRATION       SECTION      X 1    CHOLECYSTECTOMY      COLONOSCOPY      COLONOSCOPY N/A 2019    Procedure: COLONOSCOPY;  Surgeon: Jagruti Sweeney MD;  [facility] ([address] [place] [address]);  Service: Endoscopy;  Laterality: N/A;  BMI is 63/gastroparesis/3 days full liquid diet 1 day clear liquid see telephone encounter by Ciara Brown     EPIDURAL STEROID INJECTION N/A 2020    Procedure: INJECTION, STEROID, EPIDURAL, L5-S1 IL;  Surgeon: Lawrence Marin MD;  Location: [facility] PAIN MGT;  Service: Pain Management;  Laterality: N/A;    ESOPHAGEAL MANOMETRY WITH MEASUREMENT OF IMPEDANCE N/A 2019    Procedure: MANOMETRY-ESOPHAGEAL-WITH IMPEDANCE;  Surgeon: Jagruti Sweeney MD;  [facility]  Service: Endoscopy;  Laterality: N/A;  Hold Narcotics x 1 days   Hold  TCA x 1 days  Propofol only. No fentanyl or benzodiazepine during sedation. If additional sedation needed, discuss with Dr. Sweeney.  10/29 - pt confirmed appt    ESOPHAGOGASTRODUODENOSCOPY N/A 1/14/2019    Procedure: EGD (ESOPHAGOGASTRODUODENOSCOPY);  Surgeon: Jagruti Sweeney MD;  Location: [facility] (2ND FLR);  Service: Endoscopy;  Laterality: N/A;  BMI is 63/gastroparesis/3 days full liquid diet 1 day clear liquid see telephone encounter by Ciara Brown     ESOPHAGOGASTRODUODENOSCOPY N/A 11/5/2019    Procedure: ESOPHAGOGASTRODUODENOSCOPY (EGD);  Surgeon: Jagruti Sweeney MD;  Location: [facility] (2ND FLR);  Service: Endoscopy;  Laterality: N/A;  EGD with EndoFlip /+/- Botox  2nd floor for gastroparesis/BMI 63 **367lbs**  Bariatric Stretcher needed  Manometry probe to be placed endoscopically during EGD procedure  Due to change in protocol, Full liquid diet for 3 days/ 1 day of clear liquids  Hi    ESOPHAGOGASTRODUODENOSCOPY N/A 12/14/2020    Procedure: ESOPHAGOGASTRODUODENOSCOPY (EGD) with BRAVO;  Surgeon: Jagruti Sweeney MD;  Location: [facility] (2ND FLR);  Service: Endoscopy;  Laterality: N/A;  with Dilation  2nd floor due to BMI 56.20 (327 lbs) and gastroparesis  3 days full liquid diet and 1 day clears    ESOPHAGOGASTRODUODENOSCOPY N/A 4/15/2021    Procedure: ESOPHAGOGASTRODUODENOSCOPY (EGD);  Surgeon: Jagruti Sweeney MD;  Location: [facility] ENDO (2ND FLR);  Service: Endoscopy;  Laterality: N/A;  Endoflip  2nd floor-gastroparesis, BMI 57.18, bariatric stretcher  full liquid diet x3 days, clear liquid diet x1 day prior to procedure  propofol only  covid test 4/12 primary care, instructions emailed-[facility]    FOOT FRACTURE SURGERY Left     FUNCTIONAL ENDOSCOPIC SINUS SURGERY (FESS) USING COMPUTER-ASSISTED NAVIGATION Bilateral 10/22/2021    Procedure: FESS, USING COMPUTER-ASSISTED NAVIGATION;  Surgeon: John Prado III, MD;  Location: [facility] OR;  Service: ENT;  Laterality: Bilateral;  FOLLOW DR PRADO  ANESTHESIA PROTOCAL / pt will stay 23 hour post surgery for SHARATH observation    HYSTEROSCOPY WITH DILATION AND CURETTAGE OF UTERUS N/A 10/16/2018    KJB    INJECTION OF ANESTHETIC AGENT AROUND NERVE Bilateral 10/23/2020    Procedure: BLOCK, NERVE  bilateral L3,4,5 MBB;  Surgeon: Lawrence Marin MD;  Location: [facility] PAIN MGT;  Service: Pain Management;  Laterality: Bilateral;  bilateral L3,4,5 MBB   consent needed    INJECTION OF ANESTHETIC AGENT AROUND NERVE Bilateral 2/18/2022    Procedure: BLOCK, NERVE, L3-L4-L5 MEDIAL BRANCH;  Surgeon: Lawrence Marin MD;  Location: [facility] PAIN MGT;  Service: Pain Management;  Laterality: Bilateral;    PLANTAR FASCIOTOMY Left 11/18/2019    Procedure: FASCIOTOMY, PLANTAR;  Surgeon: Valentino Orourke DPM;  Location: [address];  Service: Podiatry;  Laterality: Left;    RETINAL LASER PROCEDURE Left     SINUS SURGERY      SPHENOIDECTOMY Bilateral 10/22/2021    Procedure: SPHENOIDECTOMY;  Surgeon: John Prado III, MD;  Location: [facility] OR;  Service: ENT;  Laterality: Bilateral;    TRANSFORAMINAL EPIDURAL INJECTION OF STEROID Bilateral 6/24/2020    Procedure: INJECTION, STEROID, EPIDURAL, TRANSFORAMINAL APPROACH;  Surgeon: Lawrence Marin MD;  Location: [facility] PAIN MGT;  Service: Pain Management;  Laterality: Bilateral;    TRANSFORAMINAL EPIDURAL INJECTION OF STEROID Bilateral 1/28/2022    Procedure: INJECTION, STEROID, EPIDURAL, TRANSFORAMINAL APPROACH  Bilateral TFESI L4/L5      NEEDS CONSENT;  Surgeon: Lawrence Marin MD;  Location: [facility] PAIN MGT;  Service: Pain Management;  Laterality: Bilateral;    UPPER GASTROINTESTINAL ENDOSCOPY         Review of patient's allergies indicates:   Allergen Reactions    Gabapentin Other (See Comments)     Makes her twitch       Current Facility-Administered Medications on File Prior to Encounter   Medication    albuterol inhaler 2 puff    diphenhydrAMINE injection 25 mg    EPINEPHrine (EPIPEN) 0.3 mg/0.3 mL pen injection 0.3 mg     methylPREDNISolone sodium succinate injection 40 mg    ondansetron disintegrating tablet 4 mg    sodium chloride 0.9% 500 mL flush bag    sodium chloride 0.9% flush 10 mL     Current Outpatient Medications on File Prior to Encounter   Medication Sig    albuterol (PROVENTIL) 2.5 mg /3 mL (0.083 %) nebulizer solution Take 3 mLs (2.5 mg total) by nebulization every 6 (six) hours as needed for Wheezing. Rescue    albuterol (PROVENTIL/VENTOLIN HFA) 90 mcg/actuation inhaler Inhale 2 puffs into the lungs every 6 (six) hours as needed for Wheezing or Shortness of Breath. Rescue    albuterol-ipratropium (DUO-NEB) 2.5 mg-0.5 mg/3 mL nebulizer solution Take 3 mLs by nebulization every 6 (six) hours as needed for Wheezing. Rescue (Patient not taking: Reported on 7/13/2022)    alcohol swabs (BD ALCOHOL SWABS) PadM Apply 1 each topically 2 (two) times a day.    amitriptyline (ELAVIL) 10 MG tablet Take 1 tablet (10 mg total) by mouth nightly as needed for Insomnia.    ammonium lactate 12 % Crea Apply twice daily to affected parts both feet as needed.    azelastine (ASTELIN) 137 mcg (0.1 %) nasal spray 2 sprays (274 mcg total) by Nasal route 2 (two) times daily.    blood sugar diagnostic Strp 1 each by Misc.(Non-Drug; Combo Route) route 4 (four) times daily before meals and nightly. ACCU CHEK ELVIA PLUS METER    blood-glucose meter (ACCU-CHEK ELVIA PLUS METER) Misc TEST FOUR TIMES DAILY BEFORE MEALS AND EVERY NIGHT    budesonide-formoterol 160-4.5 mcg (SYMBICORT) 160-4.5 mcg/actuation HFAA Inhale 2 puffs into the lungs every 12 (twelve) hours. Controller    cetirizine (ZYRTEC) 10 MG tablet Take 1 tablet (10 mg total) by mouth daily as needed for Allergies (itching).    clobetasoL (TEMOVATE) 0.05 % external solution Apply topically 2 (two) times daily. Prn scalp itch or rash    diclofenac sodium (VOLTAREN) 1 % Gel Apply 2 g topically 3 (three) times daily. Apply to painful joints    empagliflozin (JARDIANCE) 10 mg tablet Take 1  tablet (10 mg total) by mouth once daily.    EPINEPHrine (EPIPEN) 0.3 mg/0.3 mL AtIn Inject 0.3 mLs (0.3 mg total) into the muscle once. for 1 dose    estradioL (ESTRACE) 0.01 % (0.1 mg/gram) vaginal cream Use 1 gram of estrogen cream in vagina nightly x 2 weeks, then twice a week thereafter. (Patient not taking: Reported on 7/13/2022)    fluticasone propionate (FLONASE) 50 mcg/actuation nasal spray 2 sprays (100 mcg total) by Each Nostril route 2 (two) times a day.    lancets (ACCU-CHEK FASTCLIX LANCET DRUM) Misc 1 Device by Misc.(Non-Drug; Combo Route) route 2 (two) times a day.    lancets (ACCU-CHEK SOFTCLIX LANCETS) Misc 1 Device by Misc.(Non-Drug; Combo Route) route 4 (four) times daily.    levocetirizine (XYZAL) 5 MG tablet Take 1 tablet (5 mg total) by mouth every evening.    lidocaine (LIDODERM) 5 % Place 1 patch onto the skin once daily. Remove & Discard patch within 12 hours or as directed by MD    LIDOcaine HCL 2% (XYLOCAINE) 2 % jelly Apply topically as needed. Apply topically once nightly to affected part of foot/feet.    LINZESS 290 mcg Cap capsule Take 290 mcg by mouth once daily.    losartan (COZAAR) 100 MG tablet Take 1 tablet (100 mg total) by mouth once daily.    montelukast (SINGULAIR) 10 mg tablet Take 1 tablet (10 mg total) by mouth every evening.    MOVANTIK 12.5 mg Tab Take 1 tablet by mouth once daily.    nicotine (NICODERM CQ) 21 mg/24 hr Place 1 patch onto the skin once daily.    nystatin-triamcinolone (MYCOLOG) ointment Apply topically 2 (two) times daily.    ondansetron (ZOFRAN) 8 MG tablet Take 1 tablet (8 mg total) by mouth every 8 (eight) hours as needed for Nausea.    oxyCODONE-acetaminophen (PERCOCET) 7.5-325 mg per tablet Take 1 tablet by mouth 3 (three) times daily as needed for Pain.    pantoprazole (PROTONIX) 40 MG tablet TAKE 1 TABLET(40 MG) BY MOUTH TWICE DAILY    pantoprazole (PROTONIX) 40 MG tablet TAKE 1 TABLET(40 MG) BY MOUTH TWICE DAILY (Patient not taking: Reported on  2022)    pregabalin (LYRICA) 50 MG capsule Take 1 capsule (50 mg total) by mouth 3 (three) times daily.    QUEtiapine (SEROQUEL) 25 MG Tab Take 1 tablet (25 mg total) by mouth once daily.    semaglutide 2 mg/dose (8 mg/3 mL) PnIj Inject 2 mg into the skin every 7 days.    sodium chloride (SALINE NASAL) 0.65 % nasal spray 2 sprays by Nasal route as needed for Congestion. (Patient not taking: Reported on 2022)    sulindac (CLINORIL) 200 MG Tab Take 1 tablet (200 mg total) by mouth 2 (two) times daily.    topiramate (TOPAMAX) 50 MG tablet Take 2 tablets (100 mg total) by mouth every evening.    triamcinolone acetonide 0.1% (KENALOG) 0.1 % cream Apply topically 2 (two) times daily. Prn focally for rash spots on forehead and legs.Stop using steroid topical when skin is smooth and non itchy.  Do not treat dark or red coloring.    venlafaxine (EFFEXOR-XR) 75 MG 24 hr capsule Take 1 capsule (75 mg total) by mouth once daily. NO FURTHER REFILL WITHOUT APT     Family History       Problem Relation (Age of Onset)    Breast cancer Maternal Grandmother    COPD Father    Cancer Maternal Grandmother    Cataracts Mother, Father    Colon cancer Mother (50), Maternal Grandmother    Colon polyps Mother, Maternal Grandmother    Diabetes Mother    Glaucoma Father    Heart attack Father    Heart disease Father    Hypertension Mother, Father    No Known Problems Paternal Grandmother, Brother, Maternal Aunt, Maternal Uncle, Paternal Aunt, Paternal Uncle, Maternal Grandfather, Paternal Grandfather, Daughter, Son, Daughter, Daughter    Ulcers Father          Tobacco Use    Smoking status: Former     Packs/day: 1.00     Years: 27.00     Pack years: 27.00     Types: Cigarettes     Start date: 1992     Quit date: 2022     Years since quittin.2    Smokeless tobacco: Never    Tobacco comments:     1/2 a pack if her days are good   Substance and Sexual Activity    Alcohol use: Not Currently     Alcohol/week: 0.0 standard  drinks     Comment: socially    Drug use: No    Sexual activity: Yes     Partners: Male     Birth control/protection: None     Comment:      Review of Systems   Constitutional:  Positive for chills, fatigue and fever.   HENT:  Negative for congestion and rhinorrhea.    Eyes:  Negative for photophobia and pain.   Respiratory:  Positive for cough, shortness of breath and wheezing.    Cardiovascular:  Negative for chest pain, palpitations and leg swelling.   Gastrointestinal:  Positive for nausea. Negative for abdominal pain and vomiting.   Endocrine: Negative for polydipsia and polyuria.   Genitourinary:  Negative for dysuria and hematuria.   Musculoskeletal:  Positive for back pain (chronic) and neck pain.   Skin:  Negative for rash and wound.   Neurological:  Negative for weakness, numbness and headaches.   All other systems reviewed and are negative.  Objective:     Vital Signs (Most Recent):  Temp: (!) 100.7 °F (38.2 °C) (09/08/22 1250)  Pulse: 83 (09/08/22 1602)  Resp: 18 (09/08/22 1602)  BP: (!) 155/88 (09/08/22 1602)  SpO2: 100 % (09/08/22 1602)   Vital Signs (24h Range):  Temp:  [100.7 °F (38.2 °C)] 100.7 °F (38.2 °C)  Pulse:  [] 83  Resp:  [16-47] 18  SpO2:  [97 %-100 %] 100 %  BP: (140-193)/(72-95) 155/88        Body mass index is 62.33 kg/m².    Physical Exam  Vitals reviewed.   Constitutional:       General: She is not in acute distress.     Appearance: She is obese. She is not ill-appearing.   HENT:      Head: Normocephalic and atraumatic.      Nose: No congestion or rhinorrhea.      Mouth/Throat:      Mouth: Mucous membranes are moist.      Pharynx: Oropharynx is clear.   Eyes:      General: No scleral icterus.     Extraocular Movements: Extraocular movements intact.   Cardiovascular:      Rate and Rhythm: Normal rate and regular rhythm.   Pulmonary:      Effort: Pulmonary effort is normal. No respiratory distress (no tripoding or accessory muscle use.).      Breath sounds: Wheezing (mild  bilateral wheezing noted.) present.   Abdominal:      General: There is no distension.      Palpations: Abdomen is soft.      Tenderness: There is no abdominal tenderness.   Musculoskeletal:         General: No swelling or deformity.      Cervical back: Neck supple. No rigidity.   Skin:     General: Skin is warm and dry.   Neurological:      General: No focal deficit present.      Mental Status: She is alert and oriented to person, place, and time.   Psychiatric:         Mood and Affect: Mood normal.         Behavior: Behavior normal.           Significant Labs: All pertinent labs within the past 24 hours have been reviewed.    Significant Imaging: I have reviewed all pertinent imaging results/findings within the past 24 hours.

## 2022-09-09 ENCOUNTER — PATIENT MESSAGE (OUTPATIENT)
Dept: FAMILY MEDICINE | Facility: CLINIC | Age: 52
End: 2022-09-09
Payer: MEDICARE

## 2022-09-09 VITALS
SYSTOLIC BLOOD PRESSURE: 126 MMHG | BODY MASS INDEX: 50.02 KG/M2 | WEIGHT: 293 LBS | OXYGEN SATURATION: 100 % | HEIGHT: 64 IN | HEART RATE: 62 BPM | TEMPERATURE: 99 F | DIASTOLIC BLOOD PRESSURE: 81 MMHG | RESPIRATION RATE: 18 BRPM

## 2022-09-09 LAB
ANION GAP SERPL CALC-SCNC: 6 MMOL/L (ref 8–16)
BASOPHILS # BLD AUTO: 0.01 K/UL (ref 0–0.2)
BASOPHILS NFR BLD: 0.3 % (ref 0–1.9)
BUN SERPL-MCNC: 11 MG/DL (ref 6–20)
CALCIUM SERPL-MCNC: 8.8 MG/DL (ref 8.7–10.5)
CHLORIDE SERPL-SCNC: 111 MMOL/L (ref 95–110)
CO2 SERPL-SCNC: 21 MMOL/L (ref 23–29)
CREAT SERPL-MCNC: 1 MG/DL (ref 0.5–1.4)
DIFFERENTIAL METHOD: ABNORMAL
EOSINOPHIL # BLD AUTO: 0 K/UL (ref 0–0.5)
EOSINOPHIL NFR BLD: 0 % (ref 0–8)
ERYTHROCYTE [DISTWIDTH] IN BLOOD BY AUTOMATED COUNT: 14.6 % (ref 11.5–14.5)
EST. GFR  (NO RACE VARIABLE): >60 ML/MIN/1.73 M^2
GLUCOSE SERPL-MCNC: 119 MG/DL (ref 70–110)
HCT VFR BLD AUTO: 38.2 % (ref 37–48.5)
HGB BLD-MCNC: 12.4 G/DL (ref 12–16)
IMM GRANULOCYTES # BLD AUTO: 0.07 K/UL (ref 0–0.04)
IMM GRANULOCYTES NFR BLD AUTO: 1.9 % (ref 0–0.5)
LYMPHOCYTES # BLD AUTO: 0.5 K/UL (ref 1–4.8)
LYMPHOCYTES NFR BLD: 13.5 % (ref 18–48)
MAGNESIUM SERPL-MCNC: 2.4 MG/DL (ref 1.6–2.6)
MCH RBC QN AUTO: 30.3 PG (ref 27–31)
MCHC RBC AUTO-ENTMCNC: 32.5 G/DL (ref 32–36)
MCV RBC AUTO: 93 FL (ref 82–98)
MONOCYTES # BLD AUTO: 0.2 K/UL (ref 0.3–1)
MONOCYTES NFR BLD: 6.6 % (ref 4–15)
NEUTROPHILS # BLD AUTO: 2.8 K/UL (ref 1.8–7.7)
NEUTROPHILS NFR BLD: 77.7 % (ref 38–73)
NRBC BLD-RTO: 0 /100 WBC
PLATELET # BLD AUTO: 246 K/UL (ref 150–450)
PMV BLD AUTO: 8.8 FL (ref 9.2–12.9)
POCT GLUCOSE: 108 MG/DL (ref 70–110)
POCT GLUCOSE: 151 MG/DL (ref 70–110)
POTASSIUM SERPL-SCNC: 4.3 MMOL/L (ref 3.5–5.1)
RBC # BLD AUTO: 4.09 M/UL (ref 4–5.4)
SODIUM SERPL-SCNC: 138 MMOL/L (ref 136–145)
WBC # BLD AUTO: 3.62 K/UL (ref 3.9–12.7)

## 2022-09-09 PROCEDURE — 80048 BASIC METABOLIC PNL TOTAL CA: CPT | Performed by: STUDENT IN AN ORGANIZED HEALTH CARE EDUCATION/TRAINING PROGRAM

## 2022-09-09 PROCEDURE — 83735 ASSAY OF MAGNESIUM: CPT | Performed by: STUDENT IN AN ORGANIZED HEALTH CARE EDUCATION/TRAINING PROGRAM

## 2022-09-09 PROCEDURE — 25000242 PHARM REV CODE 250 ALT 637 W/ HCPCS: Performed by: STUDENT IN AN ORGANIZED HEALTH CARE EDUCATION/TRAINING PROGRAM

## 2022-09-09 PROCEDURE — 25000003 PHARM REV CODE 250: Performed by: STUDENT IN AN ORGANIZED HEALTH CARE EDUCATION/TRAINING PROGRAM

## 2022-09-09 PROCEDURE — 85025 COMPLETE CBC W/AUTO DIFF WBC: CPT | Performed by: STUDENT IN AN ORGANIZED HEALTH CARE EDUCATION/TRAINING PROGRAM

## 2022-09-09 PROCEDURE — S4991 NICOTINE PATCH NONLEGEND: HCPCS | Performed by: STUDENT IN AN ORGANIZED HEALTH CARE EDUCATION/TRAINING PROGRAM

## 2022-09-09 PROCEDURE — 36415 COLL VENOUS BLD VENIPUNCTURE: CPT | Performed by: STUDENT IN AN ORGANIZED HEALTH CARE EDUCATION/TRAINING PROGRAM

## 2022-09-09 PROCEDURE — G0378 HOSPITAL OBSERVATION PER HR: HCPCS

## 2022-09-09 PROCEDURE — 63600175 PHARM REV CODE 636 W HCPCS: Performed by: STUDENT IN AN ORGANIZED HEALTH CARE EDUCATION/TRAINING PROGRAM

## 2022-09-09 PROCEDURE — 94640 AIRWAY INHALATION TREATMENT: CPT

## 2022-09-09 RX ORDER — DEXAMETHASONE 6 MG/1
6 TABLET ORAL DAILY
Qty: 5 TABLET | Refills: 0 | Status: SHIPPED | OUTPATIENT
Start: 2022-09-10 | End: 2022-09-15

## 2022-09-09 RX ADMIN — HEPARIN SODIUM 5000 UNITS: 5000 INJECTION INTRAVENOUS; SUBCUTANEOUS at 05:09

## 2022-09-09 RX ADMIN — FLUTICASONE PROPIONATE 100 MCG: 50 SPRAY, METERED NASAL at 08:09

## 2022-09-09 RX ADMIN — OXYCODONE AND ACETAMINOPHEN 1 TABLET: 7.5; 325 TABLET ORAL at 05:09

## 2022-09-09 RX ADMIN — OXYCODONE HYDROCHLORIDE AND ACETAMINOPHEN 500 MG: 500 TABLET ORAL at 08:09

## 2022-09-09 RX ADMIN — PREGABALIN 50 MG: 50 CAPSULE ORAL at 08:09

## 2022-09-09 RX ADMIN — QUETIAPINE FUMARATE 25 MG: 25 TABLET ORAL at 08:09

## 2022-09-09 RX ADMIN — PANTOPRAZOLE SODIUM 40 MG: 40 TABLET, DELAYED RELEASE ORAL at 08:09

## 2022-09-09 RX ADMIN — Medication 1 PATCH: at 08:09

## 2022-09-09 RX ADMIN — LOSARTAN POTASSIUM 100 MG: 25 TABLET, FILM COATED ORAL at 08:09

## 2022-09-09 RX ADMIN — FLUTICASONE PROPIONATE 100 MCG: 50 SPRAY, METERED NASAL at 12:09

## 2022-09-09 RX ADMIN — ALBUTEROL SULFATE 2 PUFF: 90 AEROSOL, METERED RESPIRATORY (INHALATION) at 07:09

## 2022-09-09 RX ADMIN — THERA TABS 1 TABLET: TAB at 08:09

## 2022-09-09 RX ADMIN — VENLAFAXINE HYDROCHLORIDE 75 MG: 37.5 CAPSULE, EXTENDED RELEASE ORAL at 08:09

## 2022-09-09 RX ADMIN — FLUTICASONE FUROATE AND VILANTEROL TRIFENATATE 1 PUFF: 100; 25 POWDER RESPIRATORY (INHALATION) at 07:09

## 2022-09-09 RX ADMIN — CETIRIZINE HYDROCHLORIDE 10 MG: 10 TABLET, FILM COATED ORAL at 08:09

## 2022-09-09 RX ADMIN — DEXAMETHASONE 6 MG: 2 TABLET ORAL at 08:09

## 2022-09-09 NOTE — DISCHARGE SUMMARY
[facility] Medicine  Discharge Summary      Patient Name: Yanni Kaiser  MRN: [id number]  Patient Class: OP- Observation  Admission Date: 9/8/2022  Hospital Length of Stay: 1 days  Discharge Date and Time:  09/09/2022 4:22 PM  Attending Physician: No att. providers found   Discharging Provider: Robert Cortes III, MD  Primary Care Provider: Carlyle Gordillo MD      HPI:   52F with pmh of asthma, tobacco use, dm, htn presents for 4 day h/o worsening sob and cough with associated wheezing.  Patient patient states that she started feeling bad on Monday and if in progressively worsening until her symptoms brought her to the ED on 09/08.  Patient states currently her shortness of breath is improved, but she still has a nonproductive cough.  Patient states she feels like she needs to get something up but it is not coming up currently.  Patient denies any known sick contacts, but does take care of her small grandchildren he just started back at school.  Patient has also been having some nausea, but denies vomiting.  Patient has been using her home inhalers without relief of her symptoms.  Patient is a current smoker of about 1/2 pack per day, but denies alcohol or drug use.  Patient would like to be a full code.      * No surgery found *      Hospital Course:   52F with pmh of asthma, tobacco use, dm, htn presents for 4 day h/o worsening sob and cough with associated wheezing.  Patient patient states that she started feeling bad on Monday and if in progressively worsening until her symptoms brought her to the ED on 09/08.  Patient states currently her shortness of breath is improved, but she still has a nonproductive cough.  Patient states she feels like she needs to get something up but it is not coming up currently.  Patient denies any known sick contacts, but does take care of her small grandchildren he just started back at school.  Patient has also been having some nausea, but denies vomiting.   Patient has been using her home inhalers without relief of her symptoms. Overnight pts sxs continued to improve and the next morning she continued to not require oxygen for adequate oxygenation. Pt was feeling better without complaints asking if she can go home. Pt to be discharged with a course of steroids and restarted on her home medications to complete her covid quarantine. Pt should f/u with her pcp next week for re-evaluation. Pt advised to monitor her bg closely while on steroids.        Goals of Care Treatment Preferences:  Code Status: Full Code      Consults:     No new Assessment & Plan notes have been filed under this hospital service since the last note was generated.  Service: Hospital Medicine    Final Active Diagnoses:    Diagnosis Date Noted POA    PRINCIPAL PROBLEM:  Asthma exacerbation in COPD [J44.1, J45.901] 06/09/2015 Yes    COVID [U07.1] 09/08/2022 Yes    Diabetes mellitus with peripheral circulatory disorder [E11.51] 04/01/2022 Yes    Chronic pain disorder [G89.4] 06/09/2020 Yes    GERD (gastroesophageal reflux disease) [K21.9] 07/30/2016 Yes     Chronic    Hyperlipidemia [E78.5] 07/30/2016 Yes     Chronic    Epilepsy [G40.909] 07/30/2016 Yes     Chronic    Essential hypertension [I10] 02/04/2016 Yes     Chronic    Cigarette nicotine dependence without complication [F17.210] 09/03/2014 Yes      Problems Resolved During this Admission:    Diagnosis Date Noted Date Resolved POA    Chronic obstructive pulmonary disease, unspecified COPD type [J44.9] 04/01/2022 09/08/2022 Yes       Discharged Condition: good    Disposition: Home or Self Care    Follow Up:   Follow-up Information     Carlyle Gordillo MD Follow up.    Specialties: Family Medicine, Wound Care  Contact information:  [address]  [place] [address]  [phone]                       Patient Instructions:      Ambulatory referral/consult to Pulmonology   Standing Status: Future   Referral Priority: Routine Referral Type:  Consultation   Referral Reason: Specialty Services Required   Requested Specialty: Pulmonary Disease   Number of Visits Requested: 1     Ambulatory referral/consult to Outpatient Case Management   Referral Priority: Routine Referral Type: Consultation   Referral Reason: Specialty Services Required   Number of Visits Requested: 1     Diet Cardiac     Notify your health care provider if you experience any of the following:  temperature >100.4     Notify your health care provider if you experience any of the following:  persistent nausea and vomiting or diarrhea     Notify your health care provider if you experience any of the following:  severe uncontrolled pain     Notify your health care provider if you experience any of the following:  redness, tenderness, or signs of infection (pain, swelling, redness, odor or green/yellow discharge around incision site)     Notify your health care provider if you experience any of the following:  difficulty breathing or increased cough     Notify your health care provider if you experience any of the following:  increased confusion or weakness     Notify your health care provider if you experience any of the following:  persistent dizziness, light-headedness, or visual disturbances     Notify your health care provider if you experience any of the following:  worsening rash     Notify your health care provider if you experience any of the following:  severe persistent headache     Activity as tolerated       Significant Diagnostic Studies: Labs: All labs within the past 24 hours have been reviewed    Pending Diagnostic Studies:     None         Medications:  Reconciled Home Medications:      Medication List      START taking these medications    dexAMETHasone 6 MG tablet  Commonly known as: DECADRON  Take 1 tablet (6 mg total) by mouth once daily. for 5 days  Start taking on: September 10, 2022        CHANGE how you take these medications    pantoprazole 40 MG tablet  Commonly  known as: PROTONIX  TAKE 1 TABLET(40 MG) BY MOUTH TWICE DAILY  What changed: Another medication with the same name was removed. Continue taking this medication, and follow the directions you see here.        CONTINUE taking these medications    * albuterol 90 mcg/actuation inhaler  Commonly known as: PROVENTIL/VENTOLIN HFA  Inhale 2 puffs into the lungs every 6 (six) hours as needed for Wheezing or Shortness of Breath. Rescue     * albuterol 2.5 mg /3 mL (0.083 %) nebulizer solution  Commonly known as: PROVENTIL  Take 3 mLs (2.5 mg total) by nebulization every 6 (six) hours as needed for Wheezing. Rescue     albuterol-ipratropium 2.5 mg-0.5 mg/3 mL nebulizer solution  Commonly known as: DUO-NEB  Take 3 mLs by nebulization every 6 (six) hours as needed for Wheezing. Rescue     alcohol swabs Padm  Commonly known as: BD ALCOHOL SWABS  Apply 1 each topically 2 (two) times a day.     amitriptyline 10 MG tablet  Commonly known as: ELAVIL  Take 1 tablet (10 mg total) by mouth nightly as needed for Insomnia.     ammonium lactate 12 % Crea  Apply twice daily to affected parts both feet as needed.     azelastine 137 mcg (0.1 %) nasal spray  Commonly known as: ASTELIN  2 sprays (274 mcg total) by Nasal route 2 (two) times daily.     blood sugar diagnostic Strp  1 each by Misc.(Non-Drug; Combo Route) route 4 (four) times daily before meals and nightly. ACCU CHEK ELVIA PLUS METER     blood-glucose meter Misc  Commonly known as: ACCU-CHEK ELVIA PLUS METER  TEST FOUR TIMES DAILY BEFORE MEALS AND EVERY NIGHT     budesonide-formoterol 160-4.5 mcg 160-4.5 mcg/actuation Hfaa  Commonly known as: SYMBICORT  Inhale 2 puffs into the lungs every 12 (twelve) hours. Controller     cetirizine 10 MG tablet  Commonly known as: ZYRTEC  Take 1 tablet (10 mg total) by mouth daily as needed for Allergies (itching).     clobetasoL 0.05 % external solution  Commonly known as: TEMOVATE  Apply topically 2 (two) times daily. Prn scalp itch or rash      diclofenac sodium 1 % Gel  Commonly known as: VOLTAREN  Apply 2 g topically 3 (three) times daily. Apply to painful joints     EPINEPHrine 0.3 mg/0.3 mL Atin  Commonly known as: EPIPEN  Inject 0.3 mLs (0.3 mg total) into the muscle once. for 1 dose     fluticasone propionate 50 mcg/actuation nasal spray  Commonly known as: FLONASE  2 sprays (100 mcg total) by Each Nostril route 2 (two) times a day.     JARDIANCE 10 mg tablet  Generic drug: empagliflozin  Take 1 tablet (10 mg total) by mouth once daily.     * lancets Misc  Commonly known as: ACCU-CHEK SOFTCLIX LANCETS  1 Device by Misc.(Non-Drug; Combo Route) route 4 (four) times daily.     * lancets Misc  Commonly known as: ACCU-CHEK FASTCLIX LANCET DRUM  1 Device by Misc.(Non-Drug; Combo Route) route 2 (two) times a day.     levocetirizine 5 MG tablet  Commonly known as: XYZAL  Take 1 tablet (5 mg total) by mouth every evening.     LIDOcaine 5 %  Commonly known as: LIDODERM  Place 1 patch onto the skin once daily. Remove & Discard patch within 12 hours or as directed by MD     LIDOcaine HCL 2% 2 % jelly  Commonly known as: XYLOCAINE  Apply topically as needed. Apply topically once nightly to affected part of foot/feet.     LINZESS 290 mcg Cap capsule  Generic drug: linaCLOtide  Take 290 mcg by mouth once daily.     losartan 100 MG tablet  Commonly known as: COZAAR  Take 1 tablet (100 mg total) by mouth once daily.     MOVANTIK 12.5 mg Tab  Generic drug: naloxegoL  Take 1 tablet by mouth once daily.     nicotine 21 mg/24 hr  Commonly known as: NICODERM CQ  Place 1 patch onto the skin once daily.     nystatin-triamcinolone ointment  Commonly known as: MYCOLOG  Apply topically 2 (two) times daily.     ondansetron 8 MG tablet  Commonly known as: ZOFRAN  Take 1 tablet (8 mg total) by mouth every 8 (eight) hours as needed for Nausea.     oxyCODONE-acetaminophen 7.5-325 mg per tablet  Commonly known as: PERCOCET  Take 1 tablet by mouth 3 (three) times daily as needed  for Pain.     pregabalin 50 MG capsule  Commonly known as: LYRICA  Take 1 capsule (50 mg total) by mouth 3 (three) times daily.     QUEtiapine 25 MG Tab  Commonly known as: SEROQUEL  Take 1 tablet (25 mg total) by mouth once daily.     semaglutide 2 mg/dose (8 mg/3 mL) Pnij  Inject 2 mg into the skin every 7 days.     sulindac 200 MG Tab  Commonly known as: CLINORIL  Take 1 tablet (200 mg total) by mouth 2 (two) times daily.     topiramate 50 MG tablet  Commonly known as: TOPAMAX  Take 2 tablets (100 mg total) by mouth every evening.     triamcinolone acetonide 0.1% 0.1 % cream  Commonly known as: KENALOG  Apply topically 2 (two) times daily. Prn focally for rash spots on forehead and legs.Stop using steroid topical when skin is smooth and non itchy.  Do not treat dark or red coloring.     venlafaxine 75 MG 24 hr capsule  Commonly known as: EFFEXOR-XR  Take 1 capsule (75 mg total) by mouth once daily. NO FURTHER REFILL WITHOUT APT         * This list has 4 medication(s) that are the same as other medications prescribed for you. Read the directions carefully, and ask your doctor or other care provider to review them with you.            STOP taking these medications    estradioL 0.01 % (0.1 mg/gram) vaginal cream  Commonly known as: ESTRACE     montelukast 10 mg tablet  Commonly known as: SINGULAIR     sodium chloride 0.65 % nasal spray  Commonly known as: Saline NasaL            Indwelling Lines/Drains at time of discharge:   Lines/Drains/Airways     None                 Time spent on the discharge of patient: >35 minutes         Robert Cortes III, MD  Department of Hospital Medicine  [facility]

## 2022-09-09 NOTE — PLAN OF CARE
[facility] - Telemetry  Discharge Final Note    Primary Care Provider: Carlyle Gordillo MD    Expected Discharge Date: 9/9/2022    Final Discharge Note (most recent)       Final Note - 09/09/22 1239          Final Note    Assessment Type Final Discharge Note     Anticipated Discharge Disposition Home or Self Care     What phone number can be called within the next 1-3 days to see how you are doing after discharge? [phone]     Hospital Resources/Appts/Education Provided Appointments scheduled and added to AVS;Appointments scheduled by Navigator/Coordinator        Post-Acute Status    Discharge Delays None known at this time                     Important Message from Medicare             Contact Info       Carlyle Gordillo MD   Specialty: Family Medicine, Wound Care   Relationship: PCP - General    [address] [place] [address]   Phone: [phone]       Next Steps: Follow up          SW spoke with patient via phone. SW explained PCP follow up appointment is on discharge instructions. Patient voiced understanding. Patient stated her spouse is at bedside and will be providing transportation at discharge.     PHOEBE secure chat nurse Nicky that patient cleared from case management standpoint.

## 2022-09-09 NOTE — NURSING
Patient arrived to unit with tech on Room air. AAOx4.  SOB on exertion.  Complains of headache. Made aware that Tylenol is available.cont.  tele monitoring.initiated. Plan of care explained.   Safety precautions maintained/ will continue to monitor.

## 2022-09-09 NOTE — HOSPITAL COURSE
52F with pmh of asthma, tobacco use, dm, htn presents for 4 day h/o worsening sob and cough with associated wheezing.  Patient patient states that she started feeling bad on Monday and if in progressively worsening until her symptoms brought her to the ED on 09/08.  Patient states currently her shortness of breath is improved, but she still has a nonproductive cough.  Patient states she feels like she needs to get something up but it is not coming up currently.  Patient denies any known sick contacts, but does take care of her small grandchildren he just started back at school.  Patient has also been having some nausea, but denies vomiting.  Patient has been using her home inhalers without relief of her symptoms. Overnight pts sxs continued to improve and the next morning she continued to not require oxygen for adequate oxygenation. Pt was feeling better without complaints asking if she can go home. Pt to be discharged with a course of steroids and restarted on her home medications to complete her covid quarantine. Pt should f/u with her pcp next week for re-evaluation. Pt advised to monitor her bg closely while on steroids.

## 2022-09-09 NOTE — PLAN OF CARE
[facility] - Telemetry  Discharge Assessment    Primary Care Provider: Carlyle Gordillo MD     Discharge Assessment (most recent)       BRIEF DISCHARGE ASSESSMENT - 09/09/22 1230          Discharge Planning    Assessment Type Discharge Planning Brief Assessment     Resource/Environmental Concerns none     Support Systems Children     Assistance Needed None     Equipment Currently Used at Home nebulizer     Current Living Arrangements home/apartment/condo     Patient/Family Anticipates Transition to home with family     Patient/Family Anticipated Services at Transition none     DME Needed Upon Discharge  none     Discharge Plan A Home with family     Discharge Plan B Home with family        Housing Stability    In the last 12 months, was there a time when you were not able to pay the mortgage or rent on time? No     In the last 12 months, was there a time when you did not have a steady place to sleep or slept in a shelter (including now)? No        Transportation Needs    In the past 12 months, has lack of transportation kept you from medical appointments or from getting medications? No     In the past 12 months, has lack of transportation kept you from meetings, work, or from getting things needed for daily living? No                     SW spoke with patient via phone. Patient stated she lives with her daughter who helps her at home. Patient stated she plans to return home, and her spouse will provide transportation at discharge.

## 2022-09-09 NOTE — PLAN OF CARE
Problem: Adult Inpatient Plan of Care  Goal: Plan of Care Review  9/9/2022 0451 by Idalia Hunt RN  Outcome: Ongoing, Progressing  9/9/2022 0450 by Idalia Hunt RN  Outcome: Ongoing, Progressing     Problem: Bariatric Environmental Safety  Goal: Safety Maintained with Care  9/9/2022 0451 by Idalia Hunt RN  Outcome: Ongoing, Progressing  9/9/2022 0450 by Idalia Hunt RN  Outcome: Ongoing, Progressing     Problem: Diabetes Comorbidity  Goal: Blood Glucose Level Within Targeted Range  9/9/2022 0451 by Idalia Hunt RN  Outcome: Ongoing, Progressing  9/9/2022 0450 by Idalia Hunt RN  Outcome: Ongoing, Progressing  Intervention: Monitor and Manage Glycemia  9/9/2022 0451 by Idalia Hunt RN  Flowsheets (Taken 9/9/2022 0451)  Glycemic Management: blood glucose monitored  9/9/2022 0450 by Idalia Hunt RN  Flowsheets (Taken 9/9/2022 0450)  Glycemic Management: blood glucose monitored

## 2022-09-09 NOTE — NURSING
9/9/22 2438 Patient provided discharge packet with medications and follow up appointments. Patient verbalizes understanding. Patient IV removed, tip intact, dry dressing applied. Patient telemetry monitor removed.

## 2022-09-10 LAB
BACTERIA SPEC AEROBE CULT: NORMAL
GRAM STN SPEC: NORMAL

## 2022-09-12 ENCOUNTER — TELEPHONE (OUTPATIENT)
Dept: ADMINISTRATIVE | Facility: CLINIC | Age: 52
End: 2022-09-12
Payer: MEDICARE

## 2022-09-12 LAB
BACTERIA BLD CULT: NORMAL
BACTERIA BLD CULT: NORMAL

## 2022-09-13 ENCOUNTER — TELEPHONE (OUTPATIENT)
Dept: ADMINISTRATIVE | Facility: CLINIC | Age: 52
End: 2022-09-13
Payer: MEDICARE

## 2022-09-13 ENCOUNTER — OFFICE VISIT (OUTPATIENT)
Dept: INTERNAL MEDICINE | Facility: CLINIC | Age: 52
End: 2022-09-13
Payer: MEDICARE

## 2022-09-13 DIAGNOSIS — G47.33 OSA (OBSTRUCTIVE SLEEP APNEA): ICD-10-CM

## 2022-09-13 DIAGNOSIS — I10 ESSENTIAL HYPERTENSION: Chronic | ICD-10-CM

## 2022-09-13 DIAGNOSIS — M54.16 LUMBAR RADICULOPATHY: ICD-10-CM

## 2022-09-13 DIAGNOSIS — G89.4 CHRONIC PAIN DISORDER: ICD-10-CM

## 2022-09-13 DIAGNOSIS — I83.90 VARICOSE VEINS OF LOWER EXTREMITY, UNSPECIFIED LATERALITY, UNSPECIFIED WHETHER COMPLICATED: ICD-10-CM

## 2022-09-13 DIAGNOSIS — U07.1 COVID: ICD-10-CM

## 2022-09-13 DIAGNOSIS — M72.2 PLANTAR FASCIITIS: ICD-10-CM

## 2022-09-13 DIAGNOSIS — G89.29 CHRONIC LEFT-SIDED LOW BACK PAIN WITH LEFT-SIDED SCIATICA: ICD-10-CM

## 2022-09-13 DIAGNOSIS — G89.29 CHRONIC BILATERAL LOW BACK PAIN WITHOUT SCIATICA: ICD-10-CM

## 2022-09-13 DIAGNOSIS — R13.10 DYSPHAGIA, UNSPECIFIED TYPE: ICD-10-CM

## 2022-09-13 DIAGNOSIS — J44.1 ASTHMA EXACERBATION IN COPD: ICD-10-CM

## 2022-09-13 DIAGNOSIS — Z79.4 TYPE 2 DIABETES MELLITUS WITH STAGE 3A CHRONIC KIDNEY DISEASE, WITH LONG-TERM CURRENT USE OF INSULIN: ICD-10-CM

## 2022-09-13 DIAGNOSIS — M62.89 MUSCLE TIGHTNESS: ICD-10-CM

## 2022-09-13 DIAGNOSIS — F17.210 CIGARETTE NICOTINE DEPENDENCE WITHOUT COMPLICATION: ICD-10-CM

## 2022-09-13 DIAGNOSIS — Z91.51 HISTORY OF SUICIDE ATTEMPT: ICD-10-CM

## 2022-09-13 DIAGNOSIS — K21.9 GASTROESOPHAGEAL REFLUX DISEASE WITHOUT ESOPHAGITIS: Chronic | ICD-10-CM

## 2022-09-13 DIAGNOSIS — M51.36 DDD (DEGENERATIVE DISC DISEASE), LUMBAR: ICD-10-CM

## 2022-09-13 DIAGNOSIS — E44.1 MILD PROTEIN MALNUTRITION: Chronic | ICD-10-CM

## 2022-09-13 DIAGNOSIS — G40.909 NONINTRACTABLE EPILEPSY WITHOUT STATUS EPILEPTICUS, UNSPECIFIED EPILEPSY TYPE: Chronic | ICD-10-CM

## 2022-09-13 DIAGNOSIS — J45.901 ASTHMA EXACERBATION IN COPD: ICD-10-CM

## 2022-09-13 DIAGNOSIS — M62.81 WEAKNESS OF TRUNK MUSCULATURE: ICD-10-CM

## 2022-09-13 DIAGNOSIS — N18.31 STAGE 3A CHRONIC KIDNEY DISEASE: Chronic | ICD-10-CM

## 2022-09-13 DIAGNOSIS — E78.2 MIXED HYPERLIPIDEMIA: Chronic | ICD-10-CM

## 2022-09-13 DIAGNOSIS — Z78.9 DECREASED ACTIVITIES OF DAILY LIVING (ADL): ICD-10-CM

## 2022-09-13 DIAGNOSIS — Z74.09 IMPAIRED FUNCTIONAL MOBILITY, BALANCE, GAIT, AND ENDURANCE: ICD-10-CM

## 2022-09-13 DIAGNOSIS — M47.816 LUMBAR SPONDYLOSIS: ICD-10-CM

## 2022-09-13 DIAGNOSIS — Z98.890 S/P D&C (STATUS POST DILATION AND CURETTAGE): ICD-10-CM

## 2022-09-13 DIAGNOSIS — M54.50 CHRONIC BILATERAL LOW BACK PAIN WITHOUT SCIATICA: ICD-10-CM

## 2022-09-13 DIAGNOSIS — E66.01 MORBID OBESITY: ICD-10-CM

## 2022-09-13 DIAGNOSIS — J45.40 MODERATE PERSISTENT ASTHMA WITHOUT COMPLICATION: ICD-10-CM

## 2022-09-13 DIAGNOSIS — M62.89 PELVIC FLOOR DYSFUNCTION: ICD-10-CM

## 2022-09-13 DIAGNOSIS — F11.20 UNCOMPLICATED OPIOID DEPENDENCE: ICD-10-CM

## 2022-09-13 DIAGNOSIS — E11.51 DIABETES MELLITUS WITH PERIPHERAL CIRCULATORY DISORDER: ICD-10-CM

## 2022-09-13 DIAGNOSIS — M72.2 PLANTAR FASCIITIS, BILATERAL: ICD-10-CM

## 2022-09-13 DIAGNOSIS — R26.9 ABNORMALITY OF GAIT AND MOBILITY: ICD-10-CM

## 2022-09-13 DIAGNOSIS — R29.898 DECREASED STRENGTH OF LOWER EXTREMITY: ICD-10-CM

## 2022-09-13 DIAGNOSIS — Z00.00 ENCOUNTER FOR PREVENTIVE HEALTH EXAMINATION: Primary | ICD-10-CM

## 2022-09-13 DIAGNOSIS — Z74.09 IMPAIRED FUNCTIONAL MOBILITY AND ACTIVITY TOLERANCE: ICD-10-CM

## 2022-09-13 DIAGNOSIS — G43.009 MIGRAINE WITHOUT AURA AND WITHOUT STATUS MIGRAINOSUS, NOT INTRACTABLE: Chronic | ICD-10-CM

## 2022-09-13 DIAGNOSIS — M48.062 SPINAL STENOSIS OF LUMBAR REGION WITH NEUROGENIC CLAUDICATION: ICD-10-CM

## 2022-09-13 DIAGNOSIS — Z72.0 TOBACCO ABUSE: ICD-10-CM

## 2022-09-13 DIAGNOSIS — M54.42 CHRONIC LEFT-SIDED LOW BACK PAIN WITH LEFT-SIDED SCIATICA: ICD-10-CM

## 2022-09-13 DIAGNOSIS — F33.42 RECURRENT MAJOR DEPRESSIVE DISORDER, IN FULL REMISSION: ICD-10-CM

## 2022-09-13 DIAGNOSIS — N85.01 SIMPLE ENDOMETRIAL HYPERPLASIA: ICD-10-CM

## 2022-09-13 DIAGNOSIS — G93.2 IDIOPATHIC INTRACRANIAL HYPERTENSION: Chronic | ICD-10-CM

## 2022-09-13 DIAGNOSIS — N18.31 TYPE 2 DIABETES MELLITUS WITH STAGE 3A CHRONIC KIDNEY DISEASE, WITH LONG-TERM CURRENT USE OF INSULIN: ICD-10-CM

## 2022-09-13 DIAGNOSIS — D63.8 ANEMIA OF CHRONIC DISEASE: Chronic | ICD-10-CM

## 2022-09-13 DIAGNOSIS — E11.22 TYPE 2 DIABETES MELLITUS WITH STAGE 3A CHRONIC KIDNEY DISEASE, WITH LONG-TERM CURRENT USE OF INSULIN: ICD-10-CM

## 2022-09-13 PROCEDURE — 1159F PR MEDICATION LIST DOCUMENTED IN MEDICAL RECORD: ICD-10-PCS | Mod: CPTII,95,, | Performed by: NURSE PRACTITIONER

## 2022-09-13 PROCEDURE — G0439 PR MEDICARE ANNUAL WELLNESS SUBSEQUENT VISIT: ICD-10-PCS | Mod: 95,,, | Performed by: NURSE PRACTITIONER

## 2022-09-13 PROCEDURE — 99499 RISK ADDL DX/OHS AUDIT: ICD-10-PCS | Mod: HCNC,95,, | Performed by: NURSE PRACTITIONER

## 2022-09-13 PROCEDURE — 3044F PR MOST RECENT HEMOGLOBIN A1C LEVEL <7.0%: ICD-10-PCS | Mod: CPTII,95,, | Performed by: NURSE PRACTITIONER

## 2022-09-13 PROCEDURE — 3061F PR NEG MICROALBUMINURIA RESULT DOCUMENTED/REVIEW: ICD-10-PCS | Mod: CPTII,95,, | Performed by: NURSE PRACTITIONER

## 2022-09-13 PROCEDURE — 4010F ACE/ARB THERAPY RXD/TAKEN: CPT | Mod: CPTII,95,, | Performed by: NURSE PRACTITIONER

## 2022-09-13 PROCEDURE — 1160F RVW MEDS BY RX/DR IN RCRD: CPT | Mod: CPTII,95,, | Performed by: NURSE PRACTITIONER

## 2022-09-13 PROCEDURE — 99499 UNLISTED E&M SERVICE: CPT | Mod: HCNC,95,, | Performed by: NURSE PRACTITIONER

## 2022-09-13 PROCEDURE — 4010F PR ACE/ARB THEARPY RXD/TAKEN: ICD-10-PCS | Mod: CPTII,95,, | Performed by: NURSE PRACTITIONER

## 2022-09-13 PROCEDURE — G0439 PPPS, SUBSEQ VISIT: HCPCS | Mod: 95,,, | Performed by: NURSE PRACTITIONER

## 2022-09-13 PROCEDURE — 3061F NEG MICROALBUMINURIA REV: CPT | Mod: CPTII,95,, | Performed by: NURSE PRACTITIONER

## 2022-09-13 PROCEDURE — 3066F PR DOCUMENTATION OF TREATMENT FOR NEPHROPATHY: ICD-10-PCS | Mod: CPTII,95,, | Performed by: NURSE PRACTITIONER

## 2022-09-13 PROCEDURE — 1159F MED LIST DOCD IN RCRD: CPT | Mod: CPTII,95,, | Performed by: NURSE PRACTITIONER

## 2022-09-13 PROCEDURE — 1160F PR REVIEW ALL MEDS BY PRESCRIBER/CLIN PHARMACIST DOCUMENTED: ICD-10-PCS | Mod: CPTII,95,, | Performed by: NURSE PRACTITIONER

## 2022-09-13 PROCEDURE — 3044F HG A1C LEVEL LT 7.0%: CPT | Mod: CPTII,95,, | Performed by: NURSE PRACTITIONER

## 2022-09-13 PROCEDURE — 3066F NEPHROPATHY DOC TX: CPT | Mod: CPTII,95,, | Performed by: NURSE PRACTITIONER

## 2022-09-13 NOTE — PROGRESS NOTES
The patient location is: [place]  The chief complaint leading to consultation is: AWV    Visit type: audiovisual    Face to Face time with patient: Yes  30 minutes of total time spent on the encounter, which includes face to face time and non-face to face time preparing to see the patient (eg, review of tests), Obtaining and/or reviewing separately obtained history, Documenting clinical information in the electronic or other health record, Independently interpreting results (not separately reported) and communicating results to the patient/family/caregiver, or Care coordination (not separately reported).         Each patient to whom he or she provides medical services by telemedicine is:  (1) informed of the relationship between the physician and patient and the respective role of any other health care provider with respect to management of the patient; and (2) notified that he or she may decline to receive medical services by telemedicine and may withdraw from such care at any time.      Yanni Kaiser presented for a  Medicare AWV and comprehensive Health Risk Assessment today. The following components were reviewed and updated:    Medical history  Family History  Social history  Allergies and Current Medications  Health Risk Assessment  Health Maintenance  Care Team     Patient screened moderate and/or high risk for one or more social determinants of health (SDOH). Patient connected to community resources through the ED Navigator.      ** See Completed Assessments for Annual Wellness Visit within the encounter summary.**         The following assessments were completed:  Living Situation  CAGE  Depression Screening  Fall Risk Assessment (MACH 10)  Hearing Assessment(HHI)  Cognitive Function Screening  Nutrition Screening  ADL Screening  PAQ Screening    Constitutional: She is oriented to person, place, and time and well-developed, well-nourished, and in no distress. No distress.   HENT:   Head: Normocephalic  and atraumatic.   Eyes: No scleral icterus.   Pulmonary/Chest: Effort normal. No respiratory distress.   Neurological: She is alert and oriented to person, place, and time.   Skin: She is not diaphoretic.   Psychiatric: Mood and affect normal.         Diagnoses and health risks identified today and associated recommendations/orders:    1. Encounter for preventive health examination  Annual Health Risk Assessment (HRA) visit today.  Counseling and referral of health maintenance and preventative health measures performed.  Patient given annual wellness paperwork to take home.  Encouraged to return in 1 year for subsequent HRA visit.     2. Abnormality of gait and mobility  Chronic. Stable. Continue current treatment plan as previously prescribed by PCP.    3. Tobacco abuse  Chronic. Stable. Continue current treatment plan as previously prescribed by PCP.    4. SHARATH (obstructive sleep apnea)  Chronic. Stable. Continue current treatment plan as previously prescribed by PCP.    5. Impaired functional mobility, balance, gait, and endurance  Chronic. Stable. Continue current treatment plan as previously prescribed by PCP.    6. Impaired functional mobility and activity tolerance  Chronic. Stable. Continue current treatment plan as previously prescribed by PCP.    7. Decreased activities of daily living (ADL)  Chronic. Stable. Continue current treatment plan as previously prescribed by PCP.    8. Cigarette nicotine dependence without complication  Chronic. Stable. Continue current treatment plan as previously prescribed by PCP.    9. Chronic bilateral low back pain without sciatica  Chronic. Stable. Continue current treatment plan as previously prescribed by PCP.    10. Chronic left-sided low back pain with left-sided sciatica  Chronic. Stable. Continue current treatment plan as previously prescribed by PCP.    11. Decreased strength of lower extremity  Chronic. Stable. Continue current treatment plan as previously  prescribed by PCP.    12. Muscle tightness  Chronic. Stable. Continue current treatment plan as previously prescribed by PCP.    13. Plantar fasciitis, bilateral  Chronic. Stable. Continue current treatment plan as previously prescribed by PCP.    14. Plantar fasciitis  Chronic. Stable. Continue current treatment plan as previously prescribed by PCP.    15. Weakness of trunk musculature  Chronic. Stable. Continue current treatment plan as previously prescribed by PCP.    16. Dysphagia, unspecified type  Chronic. Stable. Continue current treatment plan as previously prescribed by PCP.    17. Gastroesophageal reflux disease without esophagitis  Chronic. Stable. Continue current treatment plan as previously prescribed by PCP.    18. Diabetes mellitus with peripheral circulatory disorder  Chronic. Stable. Continue current treatment plan as previously prescribed by PCP.    19. Mild protein malnutrition  Chronic. Stable. Continue current treatment plan as previously prescribed by PCP.    20. Morbid obesity  Chronic. Stable. Encouraged to increase exercise as tolerated and improve diet to heart healthy, low sodium diet. Continue current treatment plan as previously prescribed by PCP.    21. Type 2 diabetes mellitus with stage 3a chronic kidney disease, with long-term current use of insulin  Chronic. Stable. Controlled. Last Hgb A1c=6.4 from 6/20/22. Continue current treatment plan as previously prescribed by PCP.    22. Anemia of chronic disease  Chronic. Stable. Continue current treatment plan as previously prescribed by PCP.    23. Essential hypertension  Chronic. Stable. Controlled. Encouraged to increase exercise as tolerated (moderate-intensity aerobic activity and muscle-strengthening activities) improve diet to heart healthy, low sodium diet. Continue current treatment plan as previously prescribed by PCP.    24. Stage 3a chronic kidney disease  Chronic. Stable. Continue current treatment plan as previously  prescribed by PCP.    25. Simple endometrial hyperplasia  Chronic. Stable. Continue current treatment plan as previously prescribed by PCP.    26. S/P D&C (status post dilation and curettage)  Chronic. Stable. Continue current treatment plan as previously prescribed by PCP.    27. Pelvic floor dysfunction  Chronic. Stable. Continue current treatment plan as previously prescribed by PCP.    28. COVID  Chronic. Stable. Continue current treatment plan as previously prescribed by PCP.    29. Mixed hyperlipidemia  Chronic. Stable. Continue current treatment plan as previously prescribed by PCP.    30. Varicose veins of lower extremity, unspecified laterality, unspecified whether complicated  Chronic. Stable. Continue current treatment plan as previously prescribed by PCP.    31. Asthma exacerbation in COPD  Chronic. Stable. Continue current treatment plan as previously prescribed by PCP.    32. Moderate persistent asthma without complication  Chronic. Stable. Continue current treatment plan as previously prescribed by PCP.    33. Recurrent major depressive disorder, in full remission  Chronic. Stable. Continue current treatment plan as previously prescribed by PCP.    34. Uncomplicated opioid dependence  Chronic. Stable. Continue current treatment plan as previously prescribed by PCP.    35. History of suicide attempt  Chronic. Stable. Continue current treatment plan as previously prescribed by PCP.    36. Chronic pain disorder  Chronic. Stable. Continue current treatment plan as previously prescribed by PCP.    37. DDD (degenerative disc disease), lumbar  Chronic. Stable. Continue current treatment plan as previously prescribed by PCP.    38. Idiopathic intracranial hypertension  Chronic. Stable. Continue current treatment plan as previously prescribed by PCP.    39. Nonintractable epilepsy without status epilepticus, unspecified epilepsy type  Chronic. Stable. Continue current treatment plan as previously prescribed by  PCP.    40. Lumbar radiculopathy  Chronic. Stable. Continue current treatment plan as previously prescribed by PCP.    41. Lumbar spondylosis  Chronic. Stable. Continue current treatment plan as previously prescribed by PCP.    42. Migraine without aura and without status migrainosus, not intractable  Chronic. Stable. Continue current treatment plan as previously prescribed by PCP.    43. Spinal stenosis of lumbar region with neurogenic claudication  Chronic. Stable. Continue current treatment plan as previously prescribed by PCP.      Provided Vertrice with a 5-10 year written screening schedule and personal prevention plan. Recommendations were developed using the USPSTF age appropriate recommendations. Education, counseling, and referrals were provided as needed. After Visit Summary printed and given to patient which includes a list of additional screenings\tests needed.      I offered to discuss end of life issues, including information on how to make advance directives that the patient could use to name someone who would make medical decisions on their behalf if they became too ill to make themselves.    ___Patient declined  _X_Patient is interested, I provided paper work and offered to discuss.    Follow up in about 1 year (around 9/13/2023).    Erickson Harden NP      I offered to discuss advanced care planning, including how to pick a person who would make decisions for you if you were unable to make them for yourself, called a health care power of , and what kind of decisions you might make such as use of life sustaining treatments such as ventilators and tube feeding when faced with a life limiting illness recorded on a living will that they will need to know. (How you want to be cared for as you near the end of your natural life)     X Patient is interested in learning more about how to make advanced directives.  I provided them paperwork and offered to discuss this with them.

## 2022-09-13 NOTE — PATIENT INSTRUCTIONS
Counseling and Referral of Other Preventative  (Italic type indicates deductible and co-insurance are waived)    Patient Name: Yanni Kaiser  Today's Date: 9/13/2022    Health Maintenance       Date Due Completion Date    Sign Pain Contract Never done ---    COVID-19 Vaccine (4 - Booster for Pfizer series) 03/24/2022 12/30/2021    LDCT Lung Screen 08/04/2022 6/19/2018    Shingles Vaccine (1 of 2) Never done ---    Influenza Vaccine (1) 09/01/2022 10/18/2021    Hemoglobin A1c 12/20/2022 6/20/2022    Override on 5/1/2015: Done    Eye Exam 05/30/2023 5/30/2022    Diabetes Urine Screening 06/20/2023 6/20/2022    Lipid Panel 06/20/2023 6/20/2022    Foot Exam 07/07/2023 7/7/2022    Override on 3/7/2018: Done    Mammogram 12/16/2023 12/16/2021    Colorectal Cancer Screening 08/10/2026 8/10/2021    Cervical Cancer Screening 01/31/2027 1/31/2022    TETANUS VACCINE 08/19/2029 8/19/2019    Pneumococcal Vaccines (Age 0-64) (3 - PPSV23 or PCV20) 08/04/2037 4/25/2017        No orders of the defined types were placed in this encounter.    The following information is provided to all patients.  This information is to help you find resources for any of the problems found today that may be affecting your health:                Living healthy guide: www.[facility].[place].gov      Understanding Diabetes: www.diabetes.org      Eating healthy: www.cdc.gov/healthyweight      CDC home safety checklist: www.cdc.gov/steadi/patient.html      Agency on Aging: www.goea.[place].gov      Alcoholics anonymous (AA): www.aa.org      Physical Activity: www.janis.nih.gov/ai5mwqa      Tobacco use: www.quitwithusla.org

## 2022-09-16 ENCOUNTER — OFFICE VISIT (OUTPATIENT)
Dept: FAMILY MEDICINE | Facility: CLINIC | Age: 52
End: 2022-09-16
Payer: MEDICARE

## 2022-09-16 VITALS
TEMPERATURE: 98 F | HEART RATE: 84 BPM | OXYGEN SATURATION: 95 % | DIASTOLIC BLOOD PRESSURE: 68 MMHG | BODY MASS INDEX: 50.02 KG/M2 | HEIGHT: 64 IN | SYSTOLIC BLOOD PRESSURE: 122 MMHG | WEIGHT: 293 LBS

## 2022-09-16 DIAGNOSIS — K12.1 STOMATITIS: ICD-10-CM

## 2022-09-16 DIAGNOSIS — U07.1 COVID-19: Primary | ICD-10-CM

## 2022-09-16 PROCEDURE — 3074F SYST BP LT 130 MM HG: CPT | Mod: CPTII,S$GLB,, | Performed by: FAMILY MEDICINE

## 2022-09-16 PROCEDURE — 99999 PR PBB SHADOW E&M-EST. PATIENT-LVL V: ICD-10-PCS | Mod: PBBFAC,,, | Performed by: FAMILY MEDICINE

## 2022-09-16 PROCEDURE — 1159F PR MEDICATION LIST DOCUMENTED IN MEDICAL RECORD: ICD-10-PCS | Mod: CPTII,S$GLB,, | Performed by: FAMILY MEDICINE

## 2022-09-16 PROCEDURE — 3078F DIAST BP <80 MM HG: CPT | Mod: CPTII,S$GLB,, | Performed by: FAMILY MEDICINE

## 2022-09-16 PROCEDURE — 3074F PR MOST RECENT SYSTOLIC BLOOD PRESSURE < 130 MM HG: ICD-10-PCS | Mod: CPTII,S$GLB,, | Performed by: FAMILY MEDICINE

## 2022-09-16 PROCEDURE — 3078F PR MOST RECENT DIASTOLIC BLOOD PRESSURE < 80 MM HG: ICD-10-PCS | Mod: CPTII,S$GLB,, | Performed by: FAMILY MEDICINE

## 2022-09-16 PROCEDURE — 3044F HG A1C LEVEL LT 7.0%: CPT | Mod: CPTII,S$GLB,, | Performed by: FAMILY MEDICINE

## 2022-09-16 PROCEDURE — 99214 PR OFFICE/OUTPT VISIT, EST, LEVL IV, 30-39 MIN: ICD-10-PCS | Mod: S$GLB,,, | Performed by: FAMILY MEDICINE

## 2022-09-16 PROCEDURE — 3066F PR DOCUMENTATION OF TREATMENT FOR NEPHROPATHY: ICD-10-PCS | Mod: CPTII,S$GLB,, | Performed by: FAMILY MEDICINE

## 2022-09-16 PROCEDURE — 4010F PR ACE/ARB THEARPY RXD/TAKEN: ICD-10-PCS | Mod: CPTII,S$GLB,, | Performed by: FAMILY MEDICINE

## 2022-09-16 PROCEDURE — 1159F MED LIST DOCD IN RCRD: CPT | Mod: CPTII,S$GLB,, | Performed by: FAMILY MEDICINE

## 2022-09-16 PROCEDURE — 4010F ACE/ARB THERAPY RXD/TAKEN: CPT | Mod: CPTII,S$GLB,, | Performed by: FAMILY MEDICINE

## 2022-09-16 PROCEDURE — 1160F RVW MEDS BY RX/DR IN RCRD: CPT | Mod: CPTII,S$GLB,, | Performed by: FAMILY MEDICINE

## 2022-09-16 PROCEDURE — 3061F PR NEG MICROALBUMINURIA RESULT DOCUMENTED/REVIEW: ICD-10-PCS | Mod: CPTII,S$GLB,, | Performed by: FAMILY MEDICINE

## 2022-09-16 PROCEDURE — 3061F NEG MICROALBUMINURIA REV: CPT | Mod: CPTII,S$GLB,, | Performed by: FAMILY MEDICINE

## 2022-09-16 PROCEDURE — 99214 OFFICE O/P EST MOD 30 MIN: CPT | Mod: S$GLB,,, | Performed by: FAMILY MEDICINE

## 2022-09-16 PROCEDURE — 99999 PR PBB SHADOW E&M-EST. PATIENT-LVL V: CPT | Mod: PBBFAC,,, | Performed by: FAMILY MEDICINE

## 2022-09-16 PROCEDURE — 1160F PR REVIEW ALL MEDS BY PRESCRIBER/CLIN PHARMACIST DOCUMENTED: ICD-10-PCS | Mod: CPTII,S$GLB,, | Performed by: FAMILY MEDICINE

## 2022-09-16 PROCEDURE — 3008F BODY MASS INDEX DOCD: CPT | Mod: CPTII,S$GLB,, | Performed by: FAMILY MEDICINE

## 2022-09-16 PROCEDURE — 3008F PR BODY MASS INDEX (BMI) DOCUMENTED: ICD-10-PCS | Mod: CPTII,S$GLB,, | Performed by: FAMILY MEDICINE

## 2022-09-16 PROCEDURE — 3044F PR MOST RECENT HEMOGLOBIN A1C LEVEL <7.0%: ICD-10-PCS | Mod: CPTII,S$GLB,, | Performed by: FAMILY MEDICINE

## 2022-09-16 PROCEDURE — 1111F DSCHRG MED/CURRENT MED MERGE: CPT | Mod: CPTII,S$GLB,, | Performed by: FAMILY MEDICINE

## 2022-09-16 PROCEDURE — 3066F NEPHROPATHY DOC TX: CPT | Mod: CPTII,S$GLB,, | Performed by: FAMILY MEDICINE

## 2022-09-16 PROCEDURE — 1111F PR DISCHARGE MEDS RECONCILED W/ CURRENT OUTPATIENT MED LIST: ICD-10-PCS | Mod: CPTII,S$GLB,, | Performed by: FAMILY MEDICINE

## 2022-09-16 RX ORDER — CYCLOBENZAPRINE HCL 10 MG
10 TABLET ORAL 3 TIMES DAILY PRN
COMMUNITY
Start: 2022-06-28

## 2022-09-16 NOTE — PROGRESS NOTES
Routine Office Visit    Patient Name: Yanni Kaiser    : 1970  MRN: [id number]    Subjective:  Yanni is a 52 y.o. female who presents today for:    1. Covid 19  Patient following up today for covid 19.  She was admitted in observation for 1 day and discharged.  She states overall she is doing better, but is having a sore throat and mouth.  She states she was placed on 3 different nebulizers and has been doing her treatments as directed.  Her breathing has improved signficantly.  There has been no fevers, chills, or sweats.  He states a lot of food tastes salty, but no complete loss of taste.  No chest pain today.    Past Medical History  Past Medical History:   Diagnosis Date    Allergy     Anemia     Anxiety     Asthma     Back pain     Chronic bronchitis     Chronic obstructive pulmonary disease, unspecified COPD type 2022    Cigarette smoker     DDD (degenerative disc disease), lumbar 2020    Depression     Diabetes mellitus with peripheral circulatory disorder 2022    Diabetes with neurologic complications     DUB (dysfunctional uterine bleeding) 10/16/2018    GERD (gastroesophageal reflux disease)     High cholesterol     Hyperprolactinemia     Hypertension     Influenza A 2020    Neuromuscular disorder     Obese body habitus     Obesity     Pseudotumor cerebri     Renal manifestation of secondary diabetes mellitus     Respiratory failure     Seizures     Simple endometrial hyperplasia 2018    Sleep apnea     Smoker     Tobacco dependence     Urinary incontinence        Past Surgical History  Past Surgical History:   Procedure Laterality Date    ANTROSTOMY OF MAXILLARY SINUS AT INFERIOR NASAL MEATUS  10/22/2021    Procedure: MAXILLARY ANTROSTOMY, AT INFERIOR NASAL MEATUS;  Surgeon: John Prado III, MD;  Location: [facility];  Service: ENT;;    BREAST CYST ASPIRATION       SECTION      X 1    CHOLECYSTECTOMY      COLONOSCOPY      COLONOSCOPY N/A 2019    Procedure:  COLONOSCOPY;  Surgeon: Jagruti Sweeney MD;  Location: [facility] (2ND FLR);  Service: Endoscopy;  Laterality: N/A;  BMI is 63/gastroparesis/3 days full liquid diet 1 day clear liquid see telephone encounter by Ciara Pinto [facility]    EPIDURAL STEROID INJECTION N/A 09/11/2020    Procedure: INJECTION, STEROID, EPIDURAL, L5-S1 IL;  Surgeon: Lawrence Marin MD;  Location: [facility] PAIN MGT;  Service: Pain Management;  Laterality: N/A;    ESOPHAGEAL MANOMETRY WITH MEASUREMENT OF IMPEDANCE N/A 11/05/2019    Procedure: MANOMETRY-ESOPHAGEAL-WITH IMPEDANCE;  Surgeon: Jagruti Sweeney MD;  Location: [facility] ([facility] FLR);  Service: Endoscopy;  Laterality: N/A;  Hold Narcotics x 1 days   Hold TCA x 1 days  Propofol only. No fentanyl or benzodiazepine during sedation. If additional sedation needed, discuss with Dr. Sweeney.  10/29 - pt confirmed appt    ESOPHAGOGASTRODUODENOSCOPY N/A 01/14/2019    Procedure: EGD (ESOPHAGOGASTRODUODENOSCOPY);  Surgeon: Jagruti Sweeney MD;  Location: [facility] ([address] [place] [address]);  Service: Endoscopy;  Laterality: N/A;  BMI is 63/gastroparesis/3 days full liquid diet 1 day clear liquid see telephone encounter by Ciara Pinto [facility]    ESOPHAGOGASTRODUODENOSCOPY N/A 11/05/2019    Procedure: ESOPHAGOGASTRODUODENOSCOPY (EGD);  Surgeon: Jagruti Sweeney MD;  [facility]  Service: Endoscopy;  Laterality: N/A;  EGD with EndoFlip /+/- Botox  2nd floor for gastroparesis/BMI 63 **367lbs**  Bariatric Stretcher needed  Manometry probe to be placed endoscopically during EGD procedure  Due to change in protocol, Full liquid diet for 3 days/ 1 day of clear liquids  Hi    ESOPHAGOGASTRODUODENOSCOPY N/A 12/14/2020    Procedure: ESOPHAGOGASTRODUODENOSCOPY (EGD) with BRAVO;  Surgeon: Jagruti Sweeney MD;  Location: [facility] (2ND FLR);  Service: Endoscopy;  Laterality: N/A;  with Dilation  2nd floor due to BMI 56.20 (327 lbs) and gastroparesis  3 days full liquid diet and 1 day clears     ESOPHAGOGASTRODUODENOSCOPY N/A 04/15/2021    Procedure: ESOPHAGOGASTRODUODENOSCOPY (EGD);  Surgeon: Jagruti Sweeney MD;  Location: [facility] (2ND FLR);  Service: Endoscopy;  Laterality: N/A;  Endoflip  2nd floor-gastroparesis, BMI 57.18, bariatric stretcher  full liquid diet x3 days, clear liquid diet x1 day prior to procedure  propofol only  covid test 4/12 primary care, instructions emailed-[facility]    FOOT FRACTURE SURGERY Left     FUNCTIONAL ENDOSCOPIC SINUS SURGERY (FESS) USING COMPUTER-ASSISTED NAVIGATION Bilateral 10/22/2021    Procedure: FESS, USING COMPUTER-ASSISTED NAVIGATION;  Surgeon: John Prado III, MD;  Location: [facility];  Service: ENT;  Laterality: Bilateral;  FOLLOW DR PRADO ANESTHESIA PROTOCAL / pt will stay 23 hour post surgery for SHARATH observation    HYSTEROSCOPY WITH DILATION AND CURETTAGE OF UTERUS N/A 10/16/2018    KJB    INJECTION OF ANESTHETIC AGENT AROUND NERVE Bilateral 10/23/2020    Procedure: BLOCK, NERVE  bilateral L3,4,5 MBB;  Surgeon: Lawrence Marin MD;  Location: [facility] PAIN MGT;  Service: Pain Management;  Laterality: Bilateral;  bilateral L3,4,5 MBB   consent needed    INJECTION OF ANESTHETIC AGENT AROUND NERVE Bilateral 02/18/2022    Procedure: BLOCK, NERVE, L3-L4-L5 MEDIAL BRANCH;  Surgeon: Lawrence Marin MD;  Location: [facility] PAIN MGT;  Service: Pain Management;  Laterality: Bilateral;    PLANTAR FASCIOTOMY Left 11/18/2019    Procedure: FASCIOTOMY, PLANTAR;  Surgeon: Valentino Orourke DPM;  Location: [facility] 2ND FLR;  Service: Podiatry;  Laterality: Left;    RETINAL LASER PROCEDURE Left     SINUS SURGERY      SPHENOIDECTOMY Bilateral 10/22/2021    Procedure: SPHENOIDECTOMY;  Surgeon: John Prado III, MD;  Location: [facility] OR;  Service: ENT;  Laterality: Bilateral;    TRANSFORAMINAL EPIDURAL INJECTION OF STEROID Bilateral 06/24/2020    Procedure: INJECTION, STEROID, EPIDURAL, TRANSFORAMINAL APPROACH;  Surgeon: Lawrence Marin MD;  Location: [facility] PAIN MGT;  Service: Pain  Management;  Laterality: Bilateral;    TRANSFORAMINAL EPIDURAL INJECTION OF STEROID Bilateral 01/28/2022    Procedure: INJECTION, STEROID, EPIDURAL, TRANSFORAMINAL APPROACH  Bilateral TFESI L4/L5      NEEDS CONSENT;  Surgeon: Lawrence Marin MD;  Location: [facility] PAIN MGT;  Service: Pain Management;  Laterality: Bilateral;    UPPER GASTROINTESTINAL ENDOSCOPY         Family History  Family History   Problem Relation Age of Onset    Hypertension Mother     Diabetes Mother     Colon cancer Mother 50    Colon polyps Mother     Cataracts Mother     Heart disease Father     COPD Father     Heart attack Father     Hypertension Father     Ulcers Father     Cataracts Father     Glaucoma Father     No Known Problems Brother     Diabetes Daughter         prediabetes    Diabetes Daughter         prediabetic    Hypertension Daughter     Obesity Daughter     No Known Problems Daughter     No Known Problems Son     No Known Problems Maternal Aunt     No Known Problems Maternal Uncle     No Known Problems Paternal Aunt     No Known Problems Paternal Uncle     Cancer Maternal Grandmother     Breast cancer Maternal Grandmother     Colon cancer Maternal Grandmother     Colon polyps Maternal Grandmother     No Known Problems Maternal Grandfather     No Known Problems Paternal Grandmother     No Known Problems Paternal Grandfather     Celiac disease Neg Hx     Cirrhosis Neg Hx     Crohn's disease Neg Hx     Cystic fibrosis Neg Hx     Esophageal cancer Neg Hx     Hemochromatosis Neg Hx     Inflammatory bowel disease Neg Hx     Irritable bowel syndrome Neg Hx     Liver cancer Neg Hx     Liver disease Neg Hx     Rectal cancer Neg Hx     Stomach cancer Neg Hx     Ulcerative colitis Neg Hx     Jonnie's disease Neg Hx     Tuberculosis Neg Hx     Lymphoma Neg Hx     Scleroderma Neg Hx     Rheum arthritis Neg Hx     Melanoma Neg Hx     Multiple sclerosis Neg Hx     Psoriasis Neg Hx     Lupus Neg Hx     Skin cancer Neg Hx     Amblyopia Neg Hx      "Blindness Neg Hx     Macular degeneration Neg Hx     Retinal detachment Neg Hx     Strabismus Neg Hx     Stroke Neg Hx     Thyroid disease Neg Hx     Allergic rhinitis Neg Hx     Allergies Neg Hx     Angioedema Neg Hx     Asthma Neg Hx     Eczema Neg Hx     Immunodeficiency Neg Hx     Rhinitis Neg Hx     Urticaria Neg Hx     Atopy Neg Hx        Social History  Social History     Socioeconomic History    Marital status:     Number of children: 4   Occupational History    Occupation: disable   Tobacco Use    Smoking status: Every Day     Packs/day: 0.25     Years: 27.00     Pack years: 6.75     Types: Cigarettes     Start date: 1/1/1992     Passive exposure: Current    Smokeless tobacco: Never    Tobacco comments:     "1/2 pack per every 2 days or so"           10/21/22 patient says its less than half a pack now. Has been cutting back.   Substance and Sexual Activity    Alcohol use: Yes     Alcohol/week: 1.0 standard drink     Types: 1 Shots of liquor per week     Comment: occasionally    Drug use: No    Sexual activity: Yes     Partners: Male     Birth control/protection: None     Comment:      Social Determinants of Health     Financial Resource Strain: High Risk    Difficulty of Paying Living Expenses: Very hard   Food Insecurity: Food Insecurity Present    Worried About Running Out of Food in the Last Year: Often true    Ran Out of Food in the Last Year: Sometimes true   Transportation Needs: Unmet Transportation Needs    Lack of Transportation (Medical): Yes    Lack of Transportation (Non-Medical): Yes   Physical Activity: Inactive    Days of Exercise per Week: 0 days    Minutes of Exercise per Session: 0 min   Stress: Stress Concern Present    Feeling of Stress : To some extent   Social Connections: Unknown    Frequency of Communication with Friends and Family: Three times a week    Frequency of Social Gatherings with Friends and Family: Once a week    Active Member of Clubs or Organizations: Yes "    Attends Club or Organization Meetings: More than 4 times per year    Marital Status:    Housing Stability: High Risk    Unable to Pay for Housing in the Last Year: Yes    Number of Places Lived in the Last Year: 1    Unstable Housing in the Last Year: No       Current Medications  Current Outpatient Medications on File Prior to Visit   Medication Sig Dispense Refill    ammonium lactate 12 % Crea Apply twice daily to affected parts both feet as needed. 140 g 11    blood-glucose meter (ACCU-CHEK ELVIA PLUS METER) Misc TEST FOUR TIMES DAILY BEFORE MEALS AND EVERY NIGHT 90 each 1    cyclobenzaprine (FLEXERIL) 10 MG tablet       empagliflozin (JARDIANCE) 10 mg tablet Take 1 tablet (10 mg total) by mouth once daily. 90 tablet 1    lancets (ACCU-CHEK FASTCLIX LANCET DRUM) Misc 1 Device by Misc.(Non-Drug; Combo Route) route 2 (two) times a day. 200 each 4    lancets (ACCU-CHEK SOFTCLIX LANCETS) Misc 1 Device by Misc.(Non-Drug; Combo Route) route 4 (four) times daily. 200 each 3    LIDOcaine HCL 2% (XYLOCAINE) 2 % jelly Apply topically as needed. Apply topically once nightly to affected part of foot/feet. 30 mL 2    nicotine (NICODERM CQ) 21 mg/24 hr Place 1 patch onto the skin once daily. 28 patch 0    ondansetron (ZOFRAN) 8 MG tablet Take 1 tablet (8 mg total) by mouth every 8 (eight) hours as needed for Nausea. 90 tablet 11    QUEtiapine (SEROQUEL) 25 MG Tab Take 1 tablet (25 mg total) by mouth once daily. 90 tablet 0    semaglutide 2 mg/dose (8 mg/3 mL) PnIj Inject 2 mg into the skin every 7 days. 9 mL 0    topiramate (TOPAMAX) 50 MG tablet Take 2 tablets (100 mg total) by mouth every evening. 180 tablet 2    venlafaxine (EFFEXOR-XR) 75 MG 24 hr capsule Take 1 capsule (75 mg total) by mouth once daily. NO FURTHER REFILL WITHOUT APT 90 capsule 1    alcohol swabs (BD ALCOHOL SWABS) PadM Apply 1 each topically 2 (two) times a day. 200 each 4    blood sugar diagnostic Strp 1 each by Misc.(Non-Drug; Combo Route)  route 4 (four) times daily before meals and nightly. ACCU CHEK ELVIA PLUS METER 200 each 3    clobetasoL (TEMOVATE) 0.05 % external solution Apply topically 2 (two) times daily. Prn scalp itch or rash 60 mL 1    diclofenac sodium (VOLTAREN) 1 % Gel Apply 2 g topically 3 (three) times daily. Apply to painful joints 300 g 2    EPINEPHrine (EPIPEN) 0.3 mg/0.3 mL AtIn Inject 0.3 mLs (0.3 mg total) into the muscle once. for 1 dose 2 each 1    levocetirizine (XYZAL) 5 MG tablet Take 1 tablet (5 mg total) by mouth every evening. 90 tablet 3    lidocaine (LIDODERM) 5 % Place 1 patch onto the skin once daily. Remove & Discard patch within 12 hours or as directed by MD 15 patch 0    nystatin-triamcinolone (MYCOLOG) ointment Apply topically 2 (two) times daily. 60 g 2    triamcinolone acetonide 0.1% (KENALOG) 0.1 % cream Apply topically 2 (two) times daily. Prn focally for rash spots on forehead and legs.Stop using steroid topical when skin is smooth and non itchy.  Do not treat dark or red coloring. 45 g 0     Current Facility-Administered Medications on File Prior to Visit   Medication Dose Route Frequency Provider Last Rate Last Admin    albuterol inhaler 2 puff  2 puff Inhalation Q20 Min PRN Carlyle Gordillo MD        diphenhydrAMINE injection 25 mg  25 mg Intravenous Once PRN Carlyle Gordillo MD        EPINEPHrine (EPIPEN) 0.3 mg/0.3 mL pen injection 0.3 mg  0.3 mg Intramuscular PRN Carlyle Gordillo MD        methylPREDNISolone sodium succinate injection 40 mg  40 mg Intravenous Once PRN Carlyle Gordillo MD        ondansetron disintegrating tablet 4 mg  4 mg Oral Once PRN Carlyle Gordillo MD        sodium chloride 0.9% 500 mL flush bag   Intravenous PRN Carlyle Gordillo MD        sodium chloride 0.9% flush 10 mL  10 mL Intravenous PRN Carlyle Gordillo MD           Allergies   Review of patient's allergies indicates:   Allergen Reactions    Gabapentin Other (See Comments)     Makes her twitch       Review of Systems  "(Pertinent positives)  Review of Systems   Constitutional: Negative.    HENT: Negative.     Eyes: Negative.    Respiratory: Negative.     Cardiovascular:  Positive for leg swelling.   Gastrointestinal: Negative.    Genitourinary: Negative.    Musculoskeletal:  Positive for joint pain and myalgias.   Skin: Negative.    Psychiatric/Behavioral: Negative.         /68   Pulse 84   Temp 98.4 °F (36.9 °C) (Oral)   Ht 5' 4" (1.626 m)   Wt (!) 160.6 kg (354 lb 0.9 oz)   LMP 08/17/2019   SpO2 95%   BMI 60.77 kg/m²     GENERAL APPEARANCE: in no apparent distress and well developed and well nourished  HEENT: PERRLA, EOMI, Sclera clear, anicteric, Oropharynx clear, no lesions, Neck supple with midline trachea  NECK: normal, supple, no adenopathy, thyroid normal in size  RESPIRATORY: appears well, vitals normal, no respiratory distress, acyanotic, normal RR, chest clear, no wheezing, crepitations, rhonchi, normal symmetric air entry  HEART: regular rate and rhythm, S1, S2 normal, no murmur, click, rub or gallop.    ABDOMEN: abdomen is soft without tenderness, no masses, no hernias, no organomegaly, no rebound, no guarding. Suprapubic tenderness absent. No CVA tenderness.  NEUROLOGIC: normal without focal findings, CN II-XII are intact.  r  Extremities: warm/well perfused.  No abnormal hair patterns.  1+BLE  SKIN: no rashes, no wounds, no other lesions  PSYCH: Alert, oriented x 3, thought content appropriate, speech normal, pleasant and cooperative, good eye contact, well groomed, recall good, concentration/judgement good and apparently average intelligence.    Assessment/Plan:  Yanni Kaiser is a 52 y.o. female who presents today for :    Yanni was seen today for hospital follow up.    Diagnoses and all orders for this visit:    COVID-19    Stomatitis  -     Discontinue: (Magic mouthwash) 1:1 Benadryl 12.5mg/5ml liq, mylanta, LIDOcaine viscous 2%, nystatin 100,000u, prednisolone 15mg/5mL, distilled water; " Swish and swallow 10 mLs every 4 (four) hours as needed (mouth and throat pain). (Patient not taking: Reported on 11/2/2022)    Other orders  -     Cancel: Influenza - Quadrivalent *Preferred* (6 months+) (PF)     Covid symptoms resolved  Stomatitis to be treated with magic mouthwash  Flu shot to be administered at a later date as she declines today  Call with any concerns  Continue monitoring blood sugars  All aches and pains from todays visit are chronic and not new.    Carlyle Gordillo MD

## 2022-09-21 ENCOUNTER — OUTPATIENT CASE MANAGEMENT (OUTPATIENT)
Dept: ADMINISTRATIVE | Facility: OTHER | Age: 52
End: 2022-09-21
Payer: MEDICARE

## 2022-09-22 ENCOUNTER — PATIENT MESSAGE (OUTPATIENT)
Dept: PAIN MEDICINE | Facility: CLINIC | Age: 52
End: 2022-09-22
Payer: MEDICARE

## 2022-09-22 NOTE — PROGRESS NOTES
Outpatient Care Management  Initial Patient Assessment    Patient: Yanni Kaiser  MRN: [id number]  Date of Service: 09/21/2022  Completed by: Mague Mane RN  Referral Date: 09/09/2022  Program: High Risk  Status: Ongoing  Effective Dates: 9/22/2022 - present  Responsible Staff: Mague Mane RN        Reason for Visit   Patient presents with    OPCM RN First Assessment Attempt    Initial Assessment    OPCM Chart Review    OPCM Enrollment Call    OPCM RN Second Assessment Attempt       Brief Summary:  Yanni Kaiser was referred by provider for COPD. Patient qualifies for program based on risk score of 86.3%.   Active problem list, medical, surgical and social history reviewed. Active comorbidities include Asthma exacerbation in COPD, HTN, COVID, SHARATH, and nicotine dependence . Areas of need identified by patient include COPD.   Complex care plan created with patient/caregiver input. By next encounter, patient agrees to attend MD appts. Pt voiced that she is due for a pulmonary test that she usually miss every year. Pt voiced that she does not know which doctor usually orders the test, but she is interested in having it done this year. Informed pt that this CM will research to find out more information on the test that she mentioned and contact the appropriate MD or the PCP for more information.     Assessment Documentation     OPCM Initial Assessment    Involvement of Care  Do I have permission to speak with other family members about your care?: Yes (Comment: Pt gives permission to speak with Renay Schwartz.)  Assessment completed by: Patient  Identified Areas of Need  Advanced Care Planning: No  Housing: no  Medication Adherence: No  *Active medication list was reviewed and reconciled with patient and/or caregiver:   Nutrition: no  Lab Adherence: no  Depression: No  Cognitive/Behavioral Health: no  Communication: no  Health Literacy: no  Equipment/Supplies/Services: no          Problem  List and History     Problems Addressed This Visit    COPD: Identified as current problem  Depression: Not identified by patient as current problem  Hypertension: Identified as current problem  Diabetes: Identified as current problem  Anemia: Not identified by patient as current problem  Chronic Kidney Disease: Not identified by patient as current problem  Hyperlipidemia: Not identified by patient as current problem  Seizures: Not identified by patient as current problem  Asthma: Identified as current problem         Reviewed medical and social history with patient and/or caregiver. A complex care plan was discussed and completed today, with input from patient and/or caregiver.    Patient Instructions     Instructions were provided via the [employer] patient resources and are available for the patient to view on the patient portal, if active.    Next steps: Will send a message  to PCPs staff to inquire about any breathing test that may have been ordered. Will review the chart for ordered tests.    Follow up in about 1 week (around 9/28/2022) for RN Follow.    [facility] OPCM Self-Management Care Plan was developed with the patients/caregivers input and was based on identified barriers from todays assessment.  Goals were written today with the patient/caregiver and the patient has agreed to work towards these goals to improve his/her overall well-being. Patient verbalized understanding of the care plan, goals, and all of today's instructions. Encouraged patient/caregiver to communicate with his/her physician and health care team about health conditions and the treatment plan.  Provided my contact information today and encouraged patient/caregiver to call me with any questions as needed.

## 2022-09-23 ENCOUNTER — OFFICE VISIT (OUTPATIENT)
Dept: PAIN MEDICINE | Facility: CLINIC | Age: 52
End: 2022-09-23
Payer: MEDICARE

## 2022-09-23 VITALS
WEIGHT: 293 LBS | BODY MASS INDEX: 50.02 KG/M2 | SYSTOLIC BLOOD PRESSURE: 124 MMHG | HEART RATE: 87 BPM | HEIGHT: 64 IN | TEMPERATURE: 98 F | DIASTOLIC BLOOD PRESSURE: 87 MMHG | RESPIRATION RATE: 18 BRPM

## 2022-09-23 DIAGNOSIS — Z78.9 DECREASED ACTIVITIES OF DAILY LIVING (ADL): ICD-10-CM

## 2022-09-23 DIAGNOSIS — M47.816 SPONDYLOSIS OF LUMBAR REGION WITHOUT MYELOPATHY OR RADICULOPATHY: ICD-10-CM

## 2022-09-23 DIAGNOSIS — G89.4 CHRONIC PAIN DISORDER: Primary | ICD-10-CM

## 2022-09-23 DIAGNOSIS — Z74.09 IMPAIRED FUNCTIONAL MOBILITY AND ACTIVITY TOLERANCE: ICD-10-CM

## 2022-09-23 PROCEDURE — 1111F DSCHRG MED/CURRENT MED MERGE: CPT | Mod: CPTII,S$GLB,, | Performed by: NURSE PRACTITIONER

## 2022-09-23 PROCEDURE — 3044F PR MOST RECENT HEMOGLOBIN A1C LEVEL <7.0%: ICD-10-PCS | Mod: CPTII,S$GLB,, | Performed by: NURSE PRACTITIONER

## 2022-09-23 PROCEDURE — 99999 PR PBB SHADOW E&M-EST. PATIENT-LVL V: ICD-10-PCS | Mod: PBBFAC,,, | Performed by: NURSE PRACTITIONER

## 2022-09-23 PROCEDURE — 1160F RVW MEDS BY RX/DR IN RCRD: CPT | Mod: CPTII,S$GLB,, | Performed by: NURSE PRACTITIONER

## 2022-09-23 PROCEDURE — 99999 PR PBB SHADOW E&M-EST. PATIENT-LVL V: CPT | Mod: PBBFAC,,, | Performed by: NURSE PRACTITIONER

## 2022-09-23 PROCEDURE — 3079F PR MOST RECENT DIASTOLIC BLOOD PRESSURE 80-89 MM HG: ICD-10-PCS | Mod: CPTII,S$GLB,, | Performed by: NURSE PRACTITIONER

## 2022-09-23 PROCEDURE — 4010F ACE/ARB THERAPY RXD/TAKEN: CPT | Mod: CPTII,S$GLB,, | Performed by: NURSE PRACTITIONER

## 2022-09-23 PROCEDURE — 3008F BODY MASS INDEX DOCD: CPT | Mod: CPTII,S$GLB,, | Performed by: NURSE PRACTITIONER

## 2022-09-23 PROCEDURE — 99499 UNLISTED E&M SERVICE: CPT | Mod: S$GLB,,, | Performed by: NURSE PRACTITIONER

## 2022-09-23 PROCEDURE — 3066F PR DOCUMENTATION OF TREATMENT FOR NEPHROPATHY: ICD-10-PCS | Mod: CPTII,S$GLB,, | Performed by: NURSE PRACTITIONER

## 2022-09-23 PROCEDURE — 3066F NEPHROPATHY DOC TX: CPT | Mod: CPTII,S$GLB,, | Performed by: NURSE PRACTITIONER

## 2022-09-23 PROCEDURE — 99215 PR OFFICE/OUTPT VISIT, EST, LEVL V, 40-54 MIN: ICD-10-PCS | Mod: S$GLB,,, | Performed by: NURSE PRACTITIONER

## 2022-09-23 PROCEDURE — 3061F NEG MICROALBUMINURIA REV: CPT | Mod: CPTII,S$GLB,, | Performed by: NURSE PRACTITIONER

## 2022-09-23 PROCEDURE — 3061F PR NEG MICROALBUMINURIA RESULT DOCUMENTED/REVIEW: ICD-10-PCS | Mod: CPTII,S$GLB,, | Performed by: NURSE PRACTITIONER

## 2022-09-23 PROCEDURE — 1159F PR MEDICATION LIST DOCUMENTED IN MEDICAL RECORD: ICD-10-PCS | Mod: CPTII,S$GLB,, | Performed by: NURSE PRACTITIONER

## 2022-09-23 PROCEDURE — 3008F PR BODY MASS INDEX (BMI) DOCUMENTED: ICD-10-PCS | Mod: CPTII,S$GLB,, | Performed by: NURSE PRACTITIONER

## 2022-09-23 PROCEDURE — 3079F DIAST BP 80-89 MM HG: CPT | Mod: CPTII,S$GLB,, | Performed by: NURSE PRACTITIONER

## 2022-09-23 PROCEDURE — 4010F PR ACE/ARB THEARPY RXD/TAKEN: ICD-10-PCS | Mod: CPTII,S$GLB,, | Performed by: NURSE PRACTITIONER

## 2022-09-23 PROCEDURE — 1160F PR REVIEW ALL MEDS BY PRESCRIBER/CLIN PHARMACIST DOCUMENTED: ICD-10-PCS | Mod: CPTII,S$GLB,, | Performed by: NURSE PRACTITIONER

## 2022-09-23 PROCEDURE — 3074F PR MOST RECENT SYSTOLIC BLOOD PRESSURE < 130 MM HG: ICD-10-PCS | Mod: CPTII,S$GLB,, | Performed by: NURSE PRACTITIONER

## 2022-09-23 PROCEDURE — 3074F SYST BP LT 130 MM HG: CPT | Mod: CPTII,S$GLB,, | Performed by: NURSE PRACTITIONER

## 2022-09-23 PROCEDURE — 1159F MED LIST DOCD IN RCRD: CPT | Mod: CPTII,S$GLB,, | Performed by: NURSE PRACTITIONER

## 2022-09-23 PROCEDURE — 1111F PR DISCHARGE MEDS RECONCILED W/ CURRENT OUTPATIENT MED LIST: ICD-10-PCS | Mod: CPTII,S$GLB,, | Performed by: NURSE PRACTITIONER

## 2022-09-23 PROCEDURE — 99215 OFFICE O/P EST HI 40 MIN: CPT | Mod: S$GLB,,, | Performed by: NURSE PRACTITIONER

## 2022-09-23 PROCEDURE — 3044F HG A1C LEVEL LT 7.0%: CPT | Mod: CPTII,S$GLB,, | Performed by: NURSE PRACTITIONER

## 2022-09-23 NOTE — PROGRESS NOTES
[facility]     Subjective:       Chief Complaint Requiring Rehabilitation: chronic Pain    Consulted by: Dr. Marin (Pain Management)      [facility] 3 month F/U 9/23/2022:  Radha returns to clinic today for 3 month f/u from FR. She was doing well initially after the program ended. She was doing home exercises. She had joined a gym. However, her  had surgery and developed a subsequent injection. He was on IV antibiotics for 6 weeks. She was caring for him during that time. After that was completed, she became ill with Covid. She is still experiencing fatigue and tinnitus from Covid. She reports that she has not been doing her stretches or exercises. She is trying to get motivated to begin again.     HPI- f/u visit. (re-referred to [facility]):  Pr again referred to FRP. Has been seen by me several times in the past now. FRP was recommended but she could not do it 2/2 the schedule (helps care for her grandson). CPE program was thus offered but she did not return for PT/OT visits.     She returns today after being referred again by Pain Mgmt. Back left leg and foot still hurting. She is in water therapy right now. Started about 2 weeks ago. Says it feels good and I like it. Says is easier to do the exercises in the water. Says afterwards 'I can feel it'; im a little sore but not too sore. She is getting injection Friday for her back. Since V she did see surgeon about her back; he said surgery should be last resort.     Not sleeping well. Back pain impacts ability to sleep well. Always moving around to get comfortable.     Not working. Disabled.     Still taking lyrica, percocet.     Mood- has been in a funk. Says I dont know if im falling into depression stage or what. Lays in bed a lot when home. Feels unhappy. Not sure what might make her happier. Says maybe its menopause. Does take effexor. Does not see a therapist and has not done this before.     Most concerned about weight and pain right  now.     Going to smoking cessation program. Went a week without smoking but then started again. Smoking helps with anxiety/feeling nervous.    Lives with 7 yo grandson who has autism. His mother lives with her, too.     HPI:   Yanni Kaiser has been seen by me in the June of this year for initial FRP eval/Chronic Pain Education. She did not return for recommended f/u visits. She returns today as she was re-referred by Dr. Marin.     LCV with Dr. Marin was 11/11/20- MRI L spine ordered, pt referred to FRP, Neurosurgery and given TPIs. Per his note, she had LMBB 10/23/20 with no relief.     She is scheduled to see Dr. Mercado 12/7.     Seen by Dr. Negron 11/6/20- he rx Cymbalta (re-started med this date), percocet 7.5 mg tid prn, baclofen tid prn; he also rec f/u with [facility] for inj and f/u with Bariatric Med.     Saw PCP 10/26/20 for COPD exacerbation. PCP notes that pt has been admitted to ICU multiple times for COPD exacerbations and continues to smoke despite counseling.     She says that her pain has been persistent. She has been doing HEP learned in PT. Last PT visit Sept- plan per note was to continue going, but pt did not go to any other appts.       Chronic Pain Education Visit #2:  Yanni Kaiser returns one week after CPE visit #1 for f/u and goal setting. We discussed 5 domains of health on which this Chronic Pain Self-Management program will focus:  1. Physical Health and Activity/Exercise (strengthening, stretching, aerobic exercises; address smoking and opioid use if pertinent)  2. Sleep/Sleep Hygiene  3. Mental/Emotional Health (including relaxation/meditation practice)  4. Nutrition   5. Socialization     She would like to focus for now on Physical Activity and Sleep as she sees these as two areas which significantly impact her function and QOL.     She said that since [facility] she has walked 3/7 days. She said that she did 5 laps around her neighborhood park the first day and  went longer the second day. She also has used an exercise step-up and did stretches and squats. She has noted an increase in her left foot pain and attributes this to her walking.     We also talked about her sleep- she usually gets in bed around 9:30/10. she said that she reads scripture on her phone before going to bed and falls asleep listening to music. She takes topamax, lyrica, and zanaflex at bedtime. She usually wakes up around 1-2 AM to use the bathroom and has difficulty falling asleep after that. She said that she stays in bed when she can't fall back asleep because she knows if she wakes up her  will also wake when he realizes she is not there. She is followed my Sleep Medicine and LCV was 4/2/20- pt set a goal to lose 20 lbs and will f/u in 3 months for repeat home sleep testing. She does have a diagnosis of SHARATH and multiple co-morbid risk factors.       Chronic Pain Education Visit #1:    Measures obtained:  PDI: 65/70  PCS: 37/52  Promis-29:     Yanni Kaiser is a 52 y.o. F who presents today with chronic pain and was referred by Dr. Marin for Chronic Pain Education (CPE). CPE was developed to enhance patients' understanding of their pain and its impact on health and to provide ongoing chronic pain self-management counseling, education, and support.     Yanni Kaiser has a history of chronic pain for many years. She denies an inciting event or trauma. She has a PMH of DM, chronic bronchitis, GERD, asthma, pseudotumor cerebri (2002), seizures (2002), migraines, sleep apnea (no CPAP). Per review of Rheumatology and Pain Mgmt notes pt has been dx with Fibromyalgia. She also has lumbar stenosis with moderate central and foraminal narrowing at L4-5 per MRI report (5/20/20). She is scheduled for a L-NAKITA with Dr. Marin on 6/24/20. Dr. Marin also referred her to outpatient PT and she has her initial eval today at 1:00 pm.     She also has a hx of left foot pain and is s/p  Plantar Fasciotomy surgery 11/2019 with Dr. Orourke, SANCHEZ. Last visit with him was 4/20/20 where she was given percocet, referred to Rheumatology for chronic pain syndrome and advised to apply voltaren gel and massage foot with frozen water bottle.     She is followed by Bariatrics for weight loss and is taking medication to help with this. Education with regard to diet and exercise has also been stressed.     HEP:  None    Physical Therapy:  Scheduled to start today.     Pain Medications:         Currently taking: lyrica, tizanidine, effexor, topamax, imitrex    Has tried in the past: norco, percocet, tramadol, ibuprofen, mobic, neurontin, flexeril, voltaren gel    Interventional Procedures:  Scheduled for L-NAKITA 6/24/20 (Dr. Marin)    Relevant Surgeries:  none    Affecting sleep?  Yes    Affecting daily activities?  Yes    Affecting mood?  Yes     Work status:  Disabled 2/2 seizures/pseudotumor cerebri     Pt does smoke cigarettes daily. Denies hx of substance abuse.       Past Medical History:   Diagnosis Date    Allergy     Anemia     Anxiety     Asthma     Back pain     Chronic bronchitis     Chronic obstructive pulmonary disease, unspecified COPD type 4/1/2022    Cigarette smoker     DDD (degenerative disc disease), lumbar 6/9/2020    Depression     Diabetes mellitus with peripheral circulatory disorder 4/1/2022    Diabetes with neurologic complications     DUB (dysfunctional uterine bleeding) 10/16/2018    GERD (gastroesophageal reflux disease)     High cholesterol     Hyperprolactinemia     Hypertension     Influenza A 01/16/2020    Neuromuscular disorder     Obese body habitus     Obesity     Pseudotumor cerebri     Renal manifestation of secondary diabetes mellitus     Respiratory failure     Seizures     Simple endometrial hyperplasia 2018    Sleep apnea     Smoker     Tobacco dependence     Urinary incontinence        Past Surgical History:   Procedure Laterality Date    ANTROSTOMY OF MAXILLARY SINUS AT  INFERIOR NASAL MEATUS  10/22/2021    Procedure: MAXILLARY ANTROSTOMY, AT INFERIOR NASAL MEATUS;  Surgeon: John Prado III, MD;  Location: [facility] OR;  Service: ENT;;    BREAST CYST ASPIRATION       SECTION      X 1    CHOLECYSTECTOMY      COLONOSCOPY      COLONOSCOPY N/A 2019    Procedure: COLONOSCOPY;  Surgeon: Jagruti Sweeney MD;  Location: [facility] (2ND FLR);  Service: Endoscopy;  Laterality: N/A;  BMI is 63/gastroparesis/3 days full liquid diet 1 day clear liquid see telephone encounter by Ciara Brown     EPIDURAL STEROID INJECTION N/A 2020    Procedure: INJECTION, STEROID, EPIDURAL, L5-S1 IL;  Surgeon: Lawrence Marin MD;  Location: [facility] PAIN MGT;  Service: Pain Management;  Laterality: N/A;    ESOPHAGEAL MANOMETRY WITH MEASUREMENT OF IMPEDANCE N/A 2019    Procedure: MANOMETRY-ESOPHAGEAL-WITH IMPEDANCE;  Surgeon: Jagruti Sweeney MD;  Location: [facility] (2ND FLR);  Service: Endoscopy;  Laterality: N/A;  Hold Narcotics x 1 days   Hold TCA x 1 days  Propofol only. No fentanyl or benzodiazepine during sedation. If additional sedation needed, discuss with Dr. Sweeney.  10/29 - pt confirmed appt    ESOPHAGOGASTRODUODENOSCOPY N/A 2019    Procedure: EGD (ESOPHAGOGASTRODUODENOSCOPY);  Surgeon: Jagruti Sweeney MD;  Location: [facility] (2ND FLR);  Service: Endoscopy;  Laterality: N/A;  BMI is 63/gastroparesis/3 days full liquid diet 1 day clear liquid see telephone encounter by Ciara limon    ESOPHAGOGASTRODUODENOSCOPY N/A 2019    Procedure: ESOPHAGOGASTRODUODENOSCOPY (EGD);  Surgeon: Jagruti Sweeney MD;  Location: [facility] (2ND FLR);  Service: Endoscopy;  Laterality: N/A;  EGD with EndoFlip /+/- Botox  2nd floor for gastroparesis/BMI 63 **367lbs**  Bariatric Stretcher needed  Manometry probe to be placed endoscopically during EGD procedure  Due to change in protocol, Full liquid diet for 3 days/ 1 day of clear liquids  Hi    ESOPHAGOGASTRODUODENOSCOPY N/A  12/14/2020    Procedure: ESOPHAGOGASTRODUODENOSCOPY (EGD) with BRAVO;  Surgeon: Jagruti Sweeney MD;  Location: [facility] ENDO (2ND FLR);  Service: Endoscopy;  Laterality: N/A;  with Dilation  2nd floor due to BMI 56.20 (327 lbs) and gastroparesis  3 days full liquid diet and 1 day clears    ESOPHAGOGASTRODUODENOSCOPY N/A 04/15/2021    Procedure: ESOPHAGOGASTRODUODENOSCOPY (EGD);  Surgeon: Jagruti Sweeney MD;  Location: [facility] ENDO (2ND FLR);  Service: Endoscopy;  Laterality: N/A;  Endoflip  2nd floor-gastroparesis, BMI 57.18, bariatric stretcher  full liquid diet x3 days, clear liquid diet x1 day prior to procedure  propofol only  covid test 4/12 primary care, instructions emailed-[facility]    FOOT FRACTURE SURGERY Left     FUNCTIONAL ENDOSCOPIC SINUS SURGERY (FESS) USING COMPUTER-ASSISTED NAVIGATION Bilateral 10/22/2021    Procedure: FESS, USING COMPUTER-ASSISTED NAVIGATION;  Surgeon: John Prado III, MD;  Location: [facility];  Service: ENT;  Laterality: Bilateral;  FOLLOW DR PRADO ANESTHESIA PROTOCAL / pt will stay 23 hour post surgery for SHARATH observation    HYSTEROSCOPY WITH DILATION AND CURETTAGE OF UTERUS N/A 10/16/2018    KJB    INJECTION OF ANESTHETIC AGENT AROUND NERVE Bilateral 10/23/2020    Procedure: BLOCK, NERVE  bilateral L3,4,5 MBB;  Surgeon: Lawrence Marin MD;  Location: [facility] PAIN MGT;  Service: Pain Management;  Laterality: Bilateral;  bilateral L3,4,5 MBB   consent needed    INJECTION OF ANESTHETIC AGENT AROUND NERVE Bilateral 02/18/2022    Procedure: BLOCK, NERVE, L3-L4-L5 MEDIAL BRANCH;  Surgeon: Lawrence Marin MD;  Location: [facility] PAIN MGT;  Service: Pain Management;  Laterality: Bilateral;    PLANTAR FASCIOTOMY Left 11/18/2019    Procedure: FASCIOTOMY, PLANTAR;  Surgeon: Valentino Orourke DPM;  Location: [facility] 2ND FLR;  Service: Podiatry;  Laterality: Left;    RETINAL LASER PROCEDURE Left     SINUS SURGERY      SPHENOIDECTOMY Bilateral 10/22/2021    Procedure: SPHENOIDECTOMY;  Surgeon: John BLANCAS  Johan SHEPHERD MD;  Location: [facility] OR;  Service: ENT;  Laterality: Bilateral;    TRANSFORAMINAL EPIDURAL INJECTION OF STEROID Bilateral 06/24/2020    Procedure: INJECTION, STEROID, EPIDURAL, TRANSFORAMINAL APPROACH;  Surgeon: Lawrence Marin MD;  Location: [facility] PAIN MGT;  Service: Pain Management;  Laterality: Bilateral;    TRANSFORAMINAL EPIDURAL INJECTION OF STEROID Bilateral 01/28/2022    Procedure: INJECTION, STEROID, EPIDURAL, TRANSFORAMINAL APPROACH  Bilateral TFESI L4/L5      NEEDS CONSENT;  Surgeon: Lawrence Marin MD;  Location: [facility] PAIN MGT;  Service: Pain Management;  Laterality: Bilateral;    UPPER GASTROINTESTINAL ENDOSCOPY         Review of patient's allergies indicates:   Allergen Reactions    Gabapentin Other (See Comments)     Makes her twitch       Current Outpatient Medications   Medication Sig Dispense Refill    (Magic mouthwash) 1:1 Benadryl 12.5mg/5ml liq, mylanta, LIDOcaine viscous 2%, nystatin 100,000u, prednisolone 15mg/5mL, distilled water Swish and swallow 10 mLs every 4 (four) hours as needed (mouth and throat pain). 480 mL 0    albuterol (PROVENTIL) 2.5 mg /3 mL (0.083 %) nebulizer solution Take 3 mLs (2.5 mg total) by nebulization every 6 (six) hours as needed for Wheezing. Rescue 3 mL 11    albuterol (PROVENTIL/VENTOLIN HFA) 90 mcg/actuation inhaler Inhale 2 puffs into the lungs every 6 (six) hours as needed for Wheezing or Shortness of Breath. Rescue 54 g 6    albuterol-ipratropium (DUO-NEB) 2.5 mg-0.5 mg/3 mL nebulizer solution Take 3 mLs by nebulization every 6 (six) hours as needed for Wheezing. Rescue 75 mL 0    alcohol swabs (BD ALCOHOL SWABS) PadM Apply 1 each topically 2 (two) times a day. 200 each 4    amitriptyline (ELAVIL) 10 MG tablet Take 1 tablet (10 mg total) by mouth nightly as needed for Insomnia. 30 tablet 2    ammonium lactate 12 % Crea Apply twice daily to affected parts both feet as needed. 140 g 11    azelastine (ASTELIN) 137 mcg (0.1 %) nasal spray 2 sprays (274  mcg total) by Nasal route 2 (two) times daily. 30 mL 11    blood sugar diagnostic Strp 1 each by Misc.(Non-Drug; Combo Route) route 4 (four) times daily before meals and nightly. ACCU CHEK ELVIA PLUS METER 200 each 3    blood-glucose meter (ACCU-CHEK ELVIA PLUS METER) [facility] TEST FOUR TIMES DAILY BEFORE MEALS AND EVERY NIGHT 90 each 1    budesonide-formoterol 160-4.5 mcg (SYMBICORT) 160-4.5 mcg/actuation HFAA Inhale 2 puffs into the lungs every 12 (twelve) hours. Controller 10.2 g 11    cetirizine (ZYRTEC) 10 MG tablet Take 1 tablet (10 mg total) by mouth daily as needed for Allergies (itching). 30 tablet 0    clobetasoL (TEMOVATE) 0.05 % external solution Apply topically 2 (two) times daily. Prn scalp itch or rash 60 mL 1    cyclobenzaprine (FLEXERIL) 10 MG tablet       diclofenac sodium (VOLTAREN) 1 % Gel Apply 2 g topically 3 (three) times daily. Apply to painful joints 300 g 2    empagliflozin (JARDIANCE) 10 mg tablet Take 1 tablet (10 mg total) by mouth once daily. 90 tablet 1    fluticasone propionate (FLONASE) 50 mcg/actuation nasal spray 2 sprays (100 mcg total) by Each Nostril route 2 (two) times a day. 16 g 11    lancets (ACCU-CHEK FASTCLIX LANCET DRUM) Misc 1 Device by Misc.(Non-Drug; Combo Route) route 2 (two) times a day. 200 each 4    lancets (ACCU-CHEK SOFTCLIX LANCETS) Misc 1 Device by Misc.(Non-Drug; Combo Route) route 4 (four) times daily. 200 each 3    levocetirizine (XYZAL) 5 MG tablet Take 1 tablet (5 mg total) by mouth every evening. 90 tablet 3    lidocaine (LIDODERM) 5 % Place 1 patch onto the skin once daily. Remove & Discard patch within 12 hours or as directed by MD 15 patch 0    LIDOcaine HCL 2% (XYLOCAINE) 2 % jelly Apply topically as needed. Apply topically once nightly to affected part of foot/feet. 30 mL 2    LINZESS 290 mcg Cap capsule Take 290 mcg by mouth once daily.      losartan (COZAAR) 100 MG tablet Take 1 tablet (100 mg total) by mouth once daily. 90 tablet 0    montelukast  (SINGULAIR) 10 mg tablet TAKE 1 TABLET(10 MG) BY MOUTH EVERY EVENING 90 tablet 1    MOVANTIK 12.5 mg Tab Take 1 tablet by mouth once daily.      nicotine (NICODERM CQ) 21 mg/24 hr Place 1 patch onto the skin once daily. 28 patch 0    nystatin-triamcinolone (MYCOLOG) ointment Apply topically 2 (two) times daily. 60 g 2    ondansetron (ZOFRAN) 8 MG tablet Take 1 tablet (8 mg total) by mouth every 8 (eight) hours as needed for Nausea. 90 tablet 11    oxyCODONE-acetaminophen (PERCOCET) 7.5-325 mg per tablet Take 1 tablet by mouth 3 (three) times daily as needed for Pain. 90 tablet 0    pantoprazole (PROTONIX) 40 MG tablet TAKE 1 TABLET(40 MG) BY MOUTH TWICE DAILY 180 tablet 0    QUEtiapine (SEROQUEL) 25 MG Tab Take 1 tablet (25 mg total) by mouth once daily. 90 tablet 0    semaglutide 2 mg/dose (8 mg/3 mL) PnIj Inject 2 mg into the skin every 7 days. 9 mL 0    sulindac (CLINORIL) 200 MG Tab Take 1 tablet (200 mg total) by mouth 2 (two) times daily. 30 tablet 0    topiramate (TOPAMAX) 50 MG tablet Take 2 tablets (100 mg total) by mouth every evening. 180 tablet 2    triamcinolone acetonide 0.1% (KENALOG) 0.1 % cream Apply topically 2 (two) times daily. Prn focally for rash spots on forehead and legs.Stop using steroid topical when skin is smooth and non itchy.  Do not treat dark or red coloring. 45 g 0    venlafaxine (EFFEXOR-XR) 75 MG 24 hr capsule Take 1 capsule (75 mg total) by mouth once daily. NO FURTHER REFILL WITHOUT APT 90 capsule 1    EPINEPHrine (EPIPEN) 0.3 mg/0.3 mL AtIn Inject 0.3 mLs (0.3 mg total) into the muscle once. for 1 dose 2 each 1    pregabalin (LYRICA) 50 MG capsule Take 1 capsule (50 mg total) by mouth 3 (three) times daily. 90 capsule 3     Current Facility-Administered Medications   Medication Dose Route Frequency Provider Last Rate Last Admin    albuterol inhaler 2 puff  2 puff Inhalation Q20 Min PRN Carlyle A. Page, MD        diphenhydrAMINE injection 25 mg  25 mg Intravenous Once PRN Carlyle  ROBIN Gordillo MD        EPINEPHrine (EPIPEN) 0.3 mg/0.3 mL pen injection 0.3 mg  0.3 mg Intramuscular PRN Carlyle Gordillo MD        methylPREDNISolone sodium succinate injection 40 mg  40 mg Intravenous Once PRN Carlyle Gordillo MD        ondansetron disintegrating tablet 4 mg  4 mg Oral Once PRN Carlyle Gordillo MD        sodium chloride 0.9% 500 mL flush bag   Intravenous PRN Carlyle Gordillo MD        sodium chloride 0.9% flush 10 mL  10 mL Intravenous PRN Carlyle Gordillo MD           Family History   Problem Relation Age of Onset    Hypertension Mother     Diabetes Mother     Colon cancer Mother 50    Colon polyps Mother     Cataracts Mother     Heart disease Father     COPD Father     Heart attack Father     Hypertension Father     Ulcers Father     Cataracts Father     Glaucoma Father     No Known Problems Brother     Diabetes Daughter         prediabetes    Diabetes Daughter         prediabetic    Hypertension Daughter     Obesity Daughter     No Known Problems Daughter     No Known Problems Son     No Known Problems Maternal Aunt     No Known Problems Maternal Uncle     No Known Problems Paternal Aunt     No Known Problems Paternal Uncle     Cancer Maternal Grandmother     Breast cancer Maternal Grandmother     Colon cancer Maternal Grandmother     Colon polyps Maternal Grandmother     No Known Problems Maternal Grandfather     No Known Problems Paternal Grandmother     No Known Problems Paternal Grandfather     Celiac disease Neg Hx     Cirrhosis Neg Hx     Crohn's disease Neg Hx     Cystic fibrosis Neg Hx     Esophageal cancer Neg Hx     Hemochromatosis Neg Hx     Inflammatory bowel disease Neg Hx     Irritable bowel syndrome Neg Hx     Liver cancer Neg Hx     Liver disease Neg Hx     Rectal cancer Neg Hx     Stomach cancer Neg Hx     Ulcerative colitis Neg Hx     Jonnie's disease Neg Hx     Tuberculosis Neg Hx     Lymphoma Neg Hx     Scleroderma Neg Hx     Rheum arthritis Neg Hx     Melanoma Neg Hx      "Multiple sclerosis Neg Hx     Psoriasis Neg Hx     Lupus Neg Hx     Skin cancer Neg Hx     Amblyopia Neg Hx     Blindness Neg Hx     Macular degeneration Neg Hx     Retinal detachment Neg Hx     Strabismus Neg Hx     Stroke Neg Hx     Thyroid disease Neg Hx     Allergic rhinitis Neg Hx     Allergies Neg Hx     Angioedema Neg Hx     Asthma Neg Hx     Eczema Neg Hx     Immunodeficiency Neg Hx     Rhinitis Neg Hx     Urticaria Neg Hx     Atopy Neg Hx        Social History     Socioeconomic History    Marital status:     Number of children: 4   Occupational History    Occupation: disable   Tobacco Use    Smoking status: Former     Packs/day: 1.00     Years: 27.00     Pack years: 27.00     Types: Cigarettes     Start date: 1/1/1992    Smokeless tobacco: Never    Tobacco comments:     "1/2 pack per every 2 days or so"   Substance and Sexual Activity    Alcohol use: Yes     Comment: occasionally    Drug use: No    Sexual activity: Yes     Partners: Male     Birth control/protection: None     Comment:      Social Determinants of Health     Financial Resource Strain: High Risk    Difficulty of Paying Living Expenses: Very hard   Food Insecurity: Food Insecurity Present    Worried About Running Out of Food in the Last Year: Often true    Ran Out of Food in the Last Year: Sometimes true   Transportation Needs: Unmet Transportation Needs    Lack of Transportation (Medical): Yes    Lack of Transportation (Non-Medical): Yes   Physical Activity: Inactive    Days of Exercise per Week: 0 days    Minutes of Exercise per Session: 0 min   Stress: No Stress Concern Present    Feeling of Stress : Only a little   Social Connections: Unknown    Frequency of Communication with Friends and Family: More than three times a week    Frequency of Social Gatherings with Friends and Family: More than three times a week    Active Member of Clubs or Organizations: Yes    Attends Club or Organization Meetings: More than 4 times per year "    Marital Status:    Housing Stability: High Risk    Unable to Pay for Housing in the Last Year: Yes    Number of Places Lived in the Last Year: 1    Unstable Housing in the Last Year: No              Objective:       Objective:     GEN: fully alert, oriented. Well developed, well nourished. No acute distress.   HEENT: No trauma. Mucous membranes moist. Nares patent bilaterally.  PSYCH: Normal affect. Thought content appropriate.  CHEST: Breathing symmetric. No audible wheezing.  SKIN: Warm, pink, dry. No rash on exposed areas.   EXT: No cyanosis, clubbing, or edema. No color change or changes in nail or hair growth  Gait: pt able to walk independently without assistive device or support.           Imaging:     MRI Lumbar Spine 5/22/2020:   COMPARISON:  MRI lumbar spine 08/28/2018     FINDINGS:  Straightening of the normal lumbar lordosis.  Mild grade 1 retrolisthesis L5 on S1     Vertebral body heights are maintained.  Several T1 and T2 hyperintense lesions within the L2 and L3 vertebral body favored to represent osseous hemangiomas.  Otherwise normal marrow signal.  No fracture.     Multilevel partial disc desiccation throughout the lumbar spine.  Posterior annular fissuring at L2-L3.     Distal spinal cord is unremarkable.  Conus terminates at L1-L2.     Degenerative findings:     T12-L1: No disc herniation, spinal canal stenosis, or neural foraminal narrowing.     L1-L2: No disc herniation, spinal canal stenosis, or neural foraminal narrowing.     L2-L3: No disc herniation, spinal canal stenosis or neural foraminal narrowing.     L3-L4: Mild circumferential disc bulge without spinal canal stenosis or neural foraminal narrowing.     L4-L5: Circumferential disc bulge, facet arthropathy, and ligamentum flavum hypertrophy resulting in moderate spinal canal stenosis and moderate bilateral neural foraminal narrowing.     L5-S1: Circumferential disc bulge and facet arthropathy without spinal canal stenosis or  neural foraminal narrowing.     Paraspinal muscles and soft tissues are unremarkable.     Impression:     Multilevel lumbar spondylosis most prominent at L4-5 resulting in moderate spinal canal stenosis and moderate bilateral neural foraminal narrowing.  Findings have slightly progressed in comparison to prior study dated 08/28/2018.     MRI Foot 9/12/2019:  EXAMINATION:  MRI FOOT (HINDFOOT) LEFT W W/O CONTRAST     CLINICAL HISTORY:  Foot pain, chronic, plantar fasciitis suspected;  Pain in left foot     TECHNIQUE:  Routine MRI evaluation of the left hindfoot performed with and without the use of 10 cc of Gadavist IV contrast.     COMPARISON:  Radiograph 09/06/2019.     FINDINGS:  Tendons: There is fusiform thickening of the distal 5.4 cm of the Achilles tendon with low-grade partial-thickness interstitial tearing of the anterior distal fibers and associated peritendinitis.  Note is made of a 0.4 cm fluid filled gap within the anteromedial fibers of the distal Achilles tendon corresponding to aforementioned interstitial tear.  There is trace fluid within the peroneal tendon sheath suggesting tenosynovitis.  Posterior tibial, flexor digitorum longus, flexor hallucis longus and extensor tendons are normal.     Ligaments: There is slight irregularity at the fibular insertion of the anterior talofibular ligament, presumably related to remote trauma.  Posterior talofibular, calcaneofibular, deltoid, spring and visualized portions of the Lisfranc ligament are normal.  Bifurcate, dorsal talonavicular and dorsal calcaneocuboid ligaments are intact.     Bones: No fractures.  No avascular necrosis.  No marrow infiltrative process.     Cartilage: No osteochondral lesions.  No partial or full-thickness cartilage defects.  No imaging findings to suggest a tarsal coalition.     Muscles: No accessory muscles.  Muscle bulk is preserved.     Miscellaneous: There is thickening and increased signal intensity of the central cord of the  plantar fascia with associated edema of the calcaneus, flexor digitorum brevis and adjacent heel pad.  No full-thickness tear or retraction.  Tarsal tunnel is normal.  Sinus tarsi demonstrates slight increased signal intensity with grossly intact cervical and interosseous ligaments.  There is trace fluid within the retrocalcaneal bursa.     Impression:     1. MR imaging findings consistent with plantar fasciitis noting thickening and increased signal intensity within the central cord of the plantar fascia with associated edema of the flexor digitorum brevis, calcaneus and adjacent heel pad.  2. Distal Achilles tendinosis with low-grade interstitial tearing and associated peritendinitis.  Trace fluid within the retrocalcaneal bursa suggests bursitis.  3. Trace peroneal tenosynovitis.  4. Irregularity at the fibular insertion of the anterior talofibular ligament suggesting sequela of a remote injury.  No full-thickness tear.             Assessment:     Encounter Diagnoses   Name Primary?    Chronic pain disorder Yes    Spondylosis of lumbar region without myelopathy or radiculopathy     Decreased activities of daily living (ADL)     Impaired functional mobility and activity tolerance          Plan:     Yanni was seen today for low-back pain and leg pain.    Diagnoses and all orders for this visit:    Chronic pain disorder    Spondylosis of lumbar region without myelopathy or radiculopathy    Decreased activities of daily living (ADL)    Impaired functional mobility and activity tolerance          Yanni Kaiser continues to deal with chronic pain that impacts her function and QOL despite medical/interventional tx. She completed Functional Restoration with benefit. Since completion of the program, she has been dealing with a lot of family stress and illness. We discussed beginning to reincorporate program strategies especially stretches and mindfulness. She verbalized agreement and understanding.    I spent a  total of 45 minutes with the patient, and greater than 50% of the time was spent in counseling and education.     F/U in 3 months for 6 months FRP follow up with NP.     The above plan and management options were discussed at length with patient. Patient is in agreement with the above and verbalized understanding. It will be communicated with the referring physician via electronic record, fax, or mail.    Kat Brooks NP  09/23/2022

## 2022-09-27 ENCOUNTER — TELEPHONE (OUTPATIENT)
Dept: FAMILY MEDICINE | Facility: CLINIC | Age: 52
End: 2022-09-27
Payer: MEDICARE

## 2022-09-27 ENCOUNTER — OFFICE VISIT (OUTPATIENT)
Dept: NEUROSURGERY | Facility: CLINIC | Age: 52
End: 2022-09-27
Payer: MEDICARE

## 2022-09-27 VITALS
SYSTOLIC BLOOD PRESSURE: 124 MMHG | BODY MASS INDEX: 50.02 KG/M2 | DIASTOLIC BLOOD PRESSURE: 87 MMHG | HEART RATE: 87 BPM | WEIGHT: 293 LBS | HEIGHT: 64 IN

## 2022-09-27 DIAGNOSIS — M54.42 CHRONIC BILATERAL LOW BACK PAIN WITH LEFT-SIDED SCIATICA: Primary | ICD-10-CM

## 2022-09-27 DIAGNOSIS — M54.16 LUMBAR RADICULOPATHY: ICD-10-CM

## 2022-09-27 DIAGNOSIS — G89.29 CHRONIC BILATERAL LOW BACK PAIN WITH LEFT-SIDED SCIATICA: Primary | ICD-10-CM

## 2022-09-27 DIAGNOSIS — M43.16 SPONDYLOLISTHESIS, LUMBAR REGION: ICD-10-CM

## 2022-09-27 DIAGNOSIS — M47.26 OTHER SPONDYLOSIS WITH RADICULOPATHY, LUMBAR REGION: ICD-10-CM

## 2022-09-27 DIAGNOSIS — M51.36 DDD (DEGENERATIVE DISC DISEASE), LUMBAR: ICD-10-CM

## 2022-09-27 DIAGNOSIS — M48.062 SPINAL STENOSIS, LUMBAR REGION WITH NEUROGENIC CLAUDICATION: ICD-10-CM

## 2022-09-27 PROCEDURE — 1159F MED LIST DOCD IN RCRD: CPT | Mod: CPTII,S$GLB,, | Performed by: PHYSICIAN ASSISTANT

## 2022-09-27 PROCEDURE — 3061F NEG MICROALBUMINURIA REV: CPT | Mod: CPTII,S$GLB,, | Performed by: PHYSICIAN ASSISTANT

## 2022-09-27 PROCEDURE — 4010F PR ACE/ARB THEARPY RXD/TAKEN: ICD-10-PCS | Mod: CPTII,S$GLB,, | Performed by: PHYSICIAN ASSISTANT

## 2022-09-27 PROCEDURE — 3079F DIAST BP 80-89 MM HG: CPT | Mod: CPTII,S$GLB,, | Performed by: PHYSICIAN ASSISTANT

## 2022-09-27 PROCEDURE — 99214 PR OFFICE/OUTPT VISIT, EST, LEVL IV, 30-39 MIN: ICD-10-PCS | Mod: S$GLB,,, | Performed by: PHYSICIAN ASSISTANT

## 2022-09-27 PROCEDURE — 99999 PR PBB SHADOW E&M-EST. PATIENT-LVL V: ICD-10-PCS | Mod: PBBFAC,,, | Performed by: PHYSICIAN ASSISTANT

## 2022-09-27 PROCEDURE — 3079F PR MOST RECENT DIASTOLIC BLOOD PRESSURE 80-89 MM HG: ICD-10-PCS | Mod: CPTII,S$GLB,, | Performed by: PHYSICIAN ASSISTANT

## 2022-09-27 PROCEDURE — 1111F PR DISCHARGE MEDS RECONCILED W/ CURRENT OUTPATIENT MED LIST: ICD-10-PCS | Mod: CPTII,S$GLB,, | Performed by: PHYSICIAN ASSISTANT

## 2022-09-27 PROCEDURE — 3066F PR DOCUMENTATION OF TREATMENT FOR NEPHROPATHY: ICD-10-PCS | Mod: CPTII,S$GLB,, | Performed by: PHYSICIAN ASSISTANT

## 2022-09-27 PROCEDURE — 3061F PR NEG MICROALBUMINURIA RESULT DOCUMENTED/REVIEW: ICD-10-PCS | Mod: CPTII,S$GLB,, | Performed by: PHYSICIAN ASSISTANT

## 2022-09-27 PROCEDURE — 3074F PR MOST RECENT SYSTOLIC BLOOD PRESSURE < 130 MM HG: ICD-10-PCS | Mod: CPTII,S$GLB,, | Performed by: PHYSICIAN ASSISTANT

## 2022-09-27 PROCEDURE — 3044F PR MOST RECENT HEMOGLOBIN A1C LEVEL <7.0%: ICD-10-PCS | Mod: CPTII,S$GLB,, | Performed by: PHYSICIAN ASSISTANT

## 2022-09-27 PROCEDURE — 3074F SYST BP LT 130 MM HG: CPT | Mod: CPTII,S$GLB,, | Performed by: PHYSICIAN ASSISTANT

## 2022-09-27 PROCEDURE — 3008F BODY MASS INDEX DOCD: CPT | Mod: CPTII,S$GLB,, | Performed by: PHYSICIAN ASSISTANT

## 2022-09-27 PROCEDURE — 4010F ACE/ARB THERAPY RXD/TAKEN: CPT | Mod: CPTII,S$GLB,, | Performed by: PHYSICIAN ASSISTANT

## 2022-09-27 PROCEDURE — 1111F DSCHRG MED/CURRENT MED MERGE: CPT | Mod: CPTII,S$GLB,, | Performed by: PHYSICIAN ASSISTANT

## 2022-09-27 PROCEDURE — 3044F HG A1C LEVEL LT 7.0%: CPT | Mod: CPTII,S$GLB,, | Performed by: PHYSICIAN ASSISTANT

## 2022-09-27 PROCEDURE — 99999 PR PBB SHADOW E&M-EST. PATIENT-LVL V: CPT | Mod: PBBFAC,,, | Performed by: PHYSICIAN ASSISTANT

## 2022-09-27 PROCEDURE — 1160F PR REVIEW ALL MEDS BY PRESCRIBER/CLIN PHARMACIST DOCUMENTED: ICD-10-PCS | Mod: CPTII,S$GLB,, | Performed by: PHYSICIAN ASSISTANT

## 2022-09-27 PROCEDURE — 99214 OFFICE O/P EST MOD 30 MIN: CPT | Mod: S$GLB,,, | Performed by: PHYSICIAN ASSISTANT

## 2022-09-27 PROCEDURE — 1160F RVW MEDS BY RX/DR IN RCRD: CPT | Mod: CPTII,S$GLB,, | Performed by: PHYSICIAN ASSISTANT

## 2022-09-27 PROCEDURE — 1159F PR MEDICATION LIST DOCUMENTED IN MEDICAL RECORD: ICD-10-PCS | Mod: CPTII,S$GLB,, | Performed by: PHYSICIAN ASSISTANT

## 2022-09-27 PROCEDURE — 3066F NEPHROPATHY DOC TX: CPT | Mod: CPTII,S$GLB,, | Performed by: PHYSICIAN ASSISTANT

## 2022-09-27 PROCEDURE — 3008F PR BODY MASS INDEX (BMI) DOCUMENTED: ICD-10-PCS | Mod: CPTII,S$GLB,, | Performed by: PHYSICIAN ASSISTANT

## 2022-09-27 NOTE — PROGRESS NOTES
Subjective:     Patient ID:  Yanni Kaiser is a 52 y.o. female.    Aki    Interval History 09/27/22    Yanni Kaiser is a 52 y.o. female who presents for follow up.  She went through the functional restoration program at [facility] that helped.  She had to stop treating herself because her  was sick and had to care for him.  She had a medial branch block with Dr. Marin at [facility] in February 2022 but she did not have RFA.  The medial branch block to help for a couple days.      She continues to have constant back pain that is across her low back.  It is worse with sitting or standing too long.  She has pain and numbness down the lateral left leg to the whole left foot.     She is also currently still following with bariatric surgery.  She states she cannot have weight loss surgery at this time because she is caring for her grandson.    Patient denies any recent accidents or trauma, no saddle anesthesias, and no bowel or bladder incontinence.    HPI    Chief Complaint:  Back pain and bilateral leg pain     HPI     Yanni Kaiser is a 51 y.o. female who presents for follow up.  Patient went to aquatic therapy after her last visit with Dr. Prabhakar a couple months ago.  It did help some but she continues to have pain in the low back down the bilateral posterior lateral legs to the ankles.  The left leg is worse than the right leg.  She is scheduled to start the functional restoration clinic next week at [facility].  She is following up with her bariatric surgeon.  She states she has been taking medication for this for some time but it is not helping with weight loss anymore.     She recently had medial branch blocks with [facility].  She has not followed up with him after that.     Patient denies any recent accidents or trauma, no saddle anesthesias, and no bowel or bladder incontinence.    Review of Systems:  Constitution: Negative for chills, fever, night sweats and  "weight loss.   Musculoskeletal: Negative for falls.   Gastrointestinal: Negative for bowel incontinence, nausea and vomiting.   Genitourinary: Negative for bladder incontinence.   Neurological: Negative for disturbances in coordination and loss of balance.      Objective:      Vitals:    09/27/22 1050   BP: 124/87   Pulse: 87   Weight: (!) 156.9 kg (346 lb)   Height: 5' 4" (1.626 m)   PainSc:   6   PainLoc: Back         Physical Exam: Exam done in the chair      General:  Yanni Kaiser is well-developed, well-nourished, appears stated age, in no acute distress, alert and oriented to person, place, and time.    Pulmonary/Chest:  Respiratory effort normal  Abdominal: Exhibits no distension  Psychiatric:  Normal mood and affect.  Behavior is normal.  Judgement and thought content normal      Musculoskeletal:    Lumbar ROM:   Pain in lumbar flexion, extension, left lateral bending, and right lateral bending.  Worse in flexion.      Neurological:  Alert and oriented to person, place, and time    Muscle strength against resistance:     Right Left   Hip flexion  5 / 5 4 / 5   Hip extension 5 / 5 5 / 5   Hip abduction 5 / 5 5 / 5   Hip adduction  5 / 5 5 / 5   Knee extension  5 / 5 4 / 5   Knee flexion 5 / 5 4 / 5   Dorsiflexion  5 / 5 4 / 5   EHL  5 / 5 5 / 5   Plantar flexion  5 / 5 5 / 5   Inversion of the feet 5 / 5 5 / 5   Eversion of the feet  5 / 5 5 / 5       On gross examination of the bilateral upper extremities, patient has full painfree ROM with no signs of clubbing, cyanosis, edema, or weakness.       XRAY/MRI Interpretation:      Lumbar spine xrays were personally reviewed today.  No fractures.  No movement on flexion and extension.  Facet arthropathy L4-S1.  DDD at L4-5 and L5-S1.  Grade one spondylolisthesis L4-5.     Lumbar spine MRI was personally reviewed today from 12/2020.  Bilateral facet arthropathy at L4-5 with moderate central stenosis.      Assessment:          1. Chronic bilateral low back " pain with left-sided sciatica    2. Other spondylosis with radiculopathy, lumbar region    3. DDD (degenerative disc disease), lumbar    4. Lumbar radiculopathy    5. Spinal stenosis, lumbar region with neurogenic claudication    6. Spondylolisthesis, lumbar region            Plan:             Grade one spondylolisthesis L4-5 with spinal stenosis.      -follow-up with [facility] regarding any further interventional pain procedures they recommend  -follow-up with bariatric surgery regarding weight loss  -patient was told to let me know if her left leg gets weaker and she verbalized understanding  -follow-up with Dr. Prabhakar in the future for further surgical consideration once the above addressed      Patient states she had a lump on her left thigh which I could feel a small bump on exam.  No redness on the skin.  She states this is very tender to the touch.  Advised her to talk to her primary care physician about this further.    Follow-Up:  Follow up if symptoms worsen or fail to improve. If there are any questions prior to this, the patient was instructed to contact the office.       Lauren Zamora [facility], PA-C  Neurosurgery  [facility]  09/27/2022

## 2022-09-28 ENCOUNTER — OFFICE VISIT (OUTPATIENT)
Dept: FAMILY MEDICINE | Facility: CLINIC | Age: 52
End: 2022-09-28
Payer: MEDICARE

## 2022-09-28 VITALS
BODY MASS INDEX: 50.02 KG/M2 | TEMPERATURE: 98 F | HEART RATE: 83 BPM | RESPIRATION RATE: 18 BRPM | WEIGHT: 293 LBS | SYSTOLIC BLOOD PRESSURE: 130 MMHG | HEIGHT: 64 IN | DIASTOLIC BLOOD PRESSURE: 80 MMHG | OXYGEN SATURATION: 96 %

## 2022-09-28 DIAGNOSIS — R22.42 MASS OF LEFT LOWER EXTREMITY: Primary | ICD-10-CM

## 2022-09-28 DIAGNOSIS — J44.9 CHRONIC OBSTRUCTIVE PULMONARY DISEASE, UNSPECIFIED COPD TYPE: ICD-10-CM

## 2022-09-28 DIAGNOSIS — Z23 INFLUENZA VACCINE NEEDED: ICD-10-CM

## 2022-09-28 PROCEDURE — 90686 FLU VACCINE (QUAD) GREATER THAN OR EQUAL TO 3YO PRESERVATIVE FREE IM: ICD-10-PCS | Mod: S$GLB,,, | Performed by: FAMILY MEDICINE

## 2022-09-28 PROCEDURE — 3079F PR MOST RECENT DIASTOLIC BLOOD PRESSURE 80-89 MM HG: ICD-10-PCS | Mod: CPTII,S$GLB,, | Performed by: FAMILY MEDICINE

## 2022-09-28 PROCEDURE — 3079F DIAST BP 80-89 MM HG: CPT | Mod: CPTII,S$GLB,, | Performed by: FAMILY MEDICINE

## 2022-09-28 PROCEDURE — 3061F NEG MICROALBUMINURIA REV: CPT | Mod: CPTII,S$GLB,, | Performed by: FAMILY MEDICINE

## 2022-09-28 PROCEDURE — 1160F RVW MEDS BY RX/DR IN RCRD: CPT | Mod: CPTII,S$GLB,, | Performed by: FAMILY MEDICINE

## 2022-09-28 PROCEDURE — 99999 PR PBB SHADOW E&M-EST. PATIENT-LVL V: CPT | Mod: PBBFAC,,, | Performed by: FAMILY MEDICINE

## 2022-09-28 PROCEDURE — 3066F PR DOCUMENTATION OF TREATMENT FOR NEPHROPATHY: ICD-10-PCS | Mod: CPTII,S$GLB,, | Performed by: FAMILY MEDICINE

## 2022-09-28 PROCEDURE — 4010F ACE/ARB THERAPY RXD/TAKEN: CPT | Mod: CPTII,S$GLB,, | Performed by: FAMILY MEDICINE

## 2022-09-28 PROCEDURE — 1159F MED LIST DOCD IN RCRD: CPT | Mod: CPTII,S$GLB,, | Performed by: FAMILY MEDICINE

## 2022-09-28 PROCEDURE — 3061F PR NEG MICROALBUMINURIA RESULT DOCUMENTED/REVIEW: ICD-10-PCS | Mod: CPTII,S$GLB,, | Performed by: FAMILY MEDICINE

## 2022-09-28 PROCEDURE — 90686 IIV4 VACC NO PRSV 0.5 ML IM: CPT | Mod: S$GLB,,, | Performed by: FAMILY MEDICINE

## 2022-09-28 PROCEDURE — 3066F NEPHROPATHY DOC TX: CPT | Mod: CPTII,S$GLB,, | Performed by: FAMILY MEDICINE

## 2022-09-28 PROCEDURE — 3008F PR BODY MASS INDEX (BMI) DOCUMENTED: ICD-10-PCS | Mod: CPTII,S$GLB,, | Performed by: FAMILY MEDICINE

## 2022-09-28 PROCEDURE — 99214 OFFICE O/P EST MOD 30 MIN: CPT | Mod: 25,S$GLB,, | Performed by: FAMILY MEDICINE

## 2022-09-28 PROCEDURE — 1111F DSCHRG MED/CURRENT MED MERGE: CPT | Mod: CPTII,S$GLB,, | Performed by: FAMILY MEDICINE

## 2022-09-28 PROCEDURE — G0008 ADMIN INFLUENZA VIRUS VAC: HCPCS | Mod: S$GLB,,, | Performed by: FAMILY MEDICINE

## 2022-09-28 PROCEDURE — 1111F PR DISCHARGE MEDS RECONCILED W/ CURRENT OUTPATIENT MED LIST: ICD-10-PCS | Mod: CPTII,S$GLB,, | Performed by: FAMILY MEDICINE

## 2022-09-28 PROCEDURE — 99999 PR PBB SHADOW E&M-EST. PATIENT-LVL V: ICD-10-PCS | Mod: PBBFAC,,, | Performed by: FAMILY MEDICINE

## 2022-09-28 PROCEDURE — 1160F PR REVIEW ALL MEDS BY PRESCRIBER/CLIN PHARMACIST DOCUMENTED: ICD-10-PCS | Mod: CPTII,S$GLB,, | Performed by: FAMILY MEDICINE

## 2022-09-28 PROCEDURE — 3008F BODY MASS INDEX DOCD: CPT | Mod: CPTII,S$GLB,, | Performed by: FAMILY MEDICINE

## 2022-09-28 PROCEDURE — 1159F PR MEDICATION LIST DOCUMENTED IN MEDICAL RECORD: ICD-10-PCS | Mod: CPTII,S$GLB,, | Performed by: FAMILY MEDICINE

## 2022-09-28 PROCEDURE — 3075F PR MOST RECENT SYSTOLIC BLOOD PRESS GE 130-139MM HG: ICD-10-PCS | Mod: CPTII,S$GLB,, | Performed by: FAMILY MEDICINE

## 2022-09-28 PROCEDURE — 3075F SYST BP GE 130 - 139MM HG: CPT | Mod: CPTII,S$GLB,, | Performed by: FAMILY MEDICINE

## 2022-09-28 PROCEDURE — 3044F PR MOST RECENT HEMOGLOBIN A1C LEVEL <7.0%: ICD-10-PCS | Mod: CPTII,S$GLB,, | Performed by: FAMILY MEDICINE

## 2022-09-28 PROCEDURE — G0008 FLU VACCINE (QUAD) GREATER THAN OR EQUAL TO 3YO PRESERVATIVE FREE IM: ICD-10-PCS | Mod: S$GLB,,, | Performed by: FAMILY MEDICINE

## 2022-09-28 PROCEDURE — 3044F HG A1C LEVEL LT 7.0%: CPT | Mod: CPTII,S$GLB,, | Performed by: FAMILY MEDICINE

## 2022-09-28 PROCEDURE — 99214 PR OFFICE/OUTPT VISIT, EST, LEVL IV, 30-39 MIN: ICD-10-PCS | Mod: 25,S$GLB,, | Performed by: FAMILY MEDICINE

## 2022-09-28 PROCEDURE — 4010F PR ACE/ARB THEARPY RXD/TAKEN: ICD-10-PCS | Mod: CPTII,S$GLB,, | Performed by: FAMILY MEDICINE

## 2022-09-28 NOTE — PROGRESS NOTES
Routine Office Visit    Patient Name: Yanni Kaiser    : 1970  MRN: [id number]    Subjective:  Yanni is a 52 y.o. female who presents today for:    1. Left leg pain  Patient presenting today with mass to left leg that has been there for months.  She states that it has been there for months.  She denies it getting bigger, but it is getting tender.  No redness or increased warmth to the area.      Past Medical History  Past Medical History:   Diagnosis Date    Allergy     Anemia     Anxiety     Asthma     Back pain     Chronic bronchitis     Chronic obstructive pulmonary disease, unspecified COPD type 2022    Cigarette smoker     DDD (degenerative disc disease), lumbar 2020    Depression     Diabetes mellitus with peripheral circulatory disorder 2022    Diabetes with neurologic complications     DUB (dysfunctional uterine bleeding) 10/16/2018    GERD (gastroesophageal reflux disease)     High cholesterol     Hyperprolactinemia     Hypertension     Influenza A 2020    Neuromuscular disorder     Obese body habitus     Obesity     Pseudotumor cerebri     Renal manifestation of secondary diabetes mellitus     Respiratory failure     Seizures     Simple endometrial hyperplasia     Sleep apnea     Smoker     Tobacco dependence     Urinary incontinence        Past Surgical History  Past Surgical History:   Procedure Laterality Date    ANTROSTOMY OF MAXILLARY SINUS AT INFERIOR NASAL MEATUS  10/22/2021    Procedure: MAXILLARY ANTROSTOMY, AT INFERIOR NASAL MEATUS;  Surgeon: John Prado III, MD;  Location: [facility];  Service: ENT;;    BREAST CYST ASPIRATION       SECTION      X 1    CHOLECYSTECTOMY      COLONOSCOPY      COLONOSCOPY N/A 2019    Procedure: COLONOSCOPY;  Surgeon: Jagruti Sweeney MD;  Location: [address]);  Service: Endoscopy;  Laterality: N/A;  BMI is 63/gastroparesis/3 days full liquid diet 1 day clear liquid see telephone encounter by Ciara Pinto  [facility]    EPIDURAL STEROID INJECTION N/A 09/11/2020    Procedure: INJECTION, STEROID, EPIDURAL, L5-S1 IL;  Surgeon: Lawrence Marin MD;  [facility];  Service: Pain Management;  Laterality: N/A;    ESOPHAGEAL MANOMETRY WITH MEASUREMENT OF IMPEDANCE N/A 11/05/2019    Procedure: MANOMETRY-ESOPHAGEAL-WITH IMPEDANCE;  Surgeon: Jagruti Sweeney MD;  Location: [facility] (2ND FLR);  Service: Endoscopy;  Laterality: N/A;  Hold Narcotics x 1 days   Hold TCA x 1 days  Propofol only. No fentanyl or benzodiazepine during sedation. If additional sedation needed, discuss with Dr. Sweeney.  10/29 - pt confirmed appt    ESOPHAGOGASTRODUODENOSCOPY N/A 01/14/2019    Procedure: EGD (ESOPHAGOGASTRODUODENOSCOPY);  Surgeon: Jagruti Sweeney MD;  Location: [facility] (2ND FLR);  Service: Endoscopy;  Laterality: N/A;  BMI is 63/gastroparesis/3 days full liquid diet 1 day clear liquid see telephone encounter by Ciara Brown     ESOPHAGOGASTRODUODENOSCOPY N/A 11/05/2019    Procedure: ESOPHAGOGASTRODUODENOSCOPY (EGD);  Surgeon: Jagruti Sweeney MD;  Location: [facility] (2ND FLR);  Service: Endoscopy;  Laterality: N/A;  EGD with EndoFlip /+/- Botox  2nd floor for gastroparesis/BMI 63 **367lbs**  Bariatric Stretcher needed  Manometry probe to be placed endoscopically during EGD procedure  Due to change in protocol, Full liquid diet for 3 days/ 1 day of clear liquids  Hi    ESOPHAGOGASTRODUODENOSCOPY N/A 12/14/2020    Procedure: ESOPHAGOGASTRODUODENOSCOPY (EGD) with BRAVO;  Surgeon: Jagruti Sweeney MD;  Location: [facility] (2ND F[facility]);  Service: Endoscopy;  Laterality: N/A;  with Dilation  2nd floor due to BMI 56.20 (327 lbs) and gastroparesis  3 days full liquid diet and 1 day clears    ESOPHAGOGASTRODUODENOSCOPY N/A 04/15/2021    Procedure: ESOPHAGOGASTRODUODENOSCOPY (EGD);  Surgeon: Jagruti Sweeney MD;  Location: [facility] (2ND FLR);  Service: Endoscopy;  Laterality: N/A;  Endoflip  2nd floor-gastroparesis, BMI 57.18, bariatric  stretcher  full liquid diet x3 days, clear liquid diet x1 day prior to procedure  propofol only  covid test 4/12 primary care, instructions emailed-KPvt    FOOT FRACTURE SURGERY Left     FUNCTIONAL ENDOSCOPIC SINUS SURGERY (FESS) USING COMPUTER-ASSISTED NAVIGATION Bilateral 10/22/2021    Procedure: FESS, USING COMPUTER-ASSISTED NAVIGATION;  Surgeon: John Prado III, MD;  Location: [facility];  Service: ENT;  Laterality: Bilateral;  FOLLOW DR PRADO ANESTHESIA PROTOCAL / pt will stay 23 hour post surgery for SHARATH observation    HYSTEROSCOPY WITH DILATION AND CURETTAGE OF UTERUS N/A 10/16/2018    KJB    INJECTION OF ANESTHETIC AGENT AROUND NERVE Bilateral 10/23/2020    Procedure: BLOCK, NERVE  bilateral L3,4,5 MBB;  Surgeon: Lawrence Marin MD;  Location: [facility];  Service: Pain Management;  Laterality: Bilateral;  bilateral L3,4,5 MBB   consent needed    INJECTION OF ANESTHETIC AGENT AROUND NERVE Bilateral 02/18/2022    Procedure: BLOCK, NERVE, L3-L4-L5 MEDIAL BRANCH;  Surgeon: Lawrence Marin MD;  Location: [facility];  Service: Pain Management;  Laterality: Bilateral;    PLANTAR FASCIOTOMY Left 11/18/2019    Procedure: FASCIOTOMY, PLANTAR;  Surgeon: Valentino Orourke DPM;  Location: [address];  Service: Podiatry;  Laterality: Left;    RETINAL LASER PROCEDURE Left     SINUS SURGERY      SPHENOIDECTOMY Bilateral 10/22/2021    Procedure: SPHENOIDECTOMY;  Surgeon: John Prado III, MD;  Location: [facility];  Service: ENT;  Laterality: Bilateral;    TRANSFORAMINAL EPIDURAL INJECTION OF STEROID Bilateral 06/24/2020    Procedure: INJECTION, STEROID, EPIDURAL, TRANSFORAMINAL APPROACH;  Surgeon: Lawrence Marin MD;  Location: [facility];  Service: Pain Management;  Laterality: Bilateral;    TRANSFORAMINAL EPIDURAL INJECTION OF STEROID Bilateral 01/28/2022    Procedure: INJECTION, STEROID, EPIDURAL, TRANSFORAMINAL APPROACH  Bilateral TFESI L4/L5      NEEDS CONSENT;  Surgeon: Lawrence Marin MD;   Location: [facility];  Service: Pain Management;  Laterality: Bilateral;    UPPER GASTROINTESTINAL ENDOSCOPY         Family History  Family History   Problem Relation Age of Onset    Hypertension Mother     Diabetes Mother     Colon cancer Mother 50    Colon polyps Mother     Cataracts Mother     Heart disease Father     COPD Father     Heart attack Father     Hypertension Father     Ulcers Father     Cataracts Father     Glaucoma Father     No Known Problems Brother     Diabetes Daughter         prediabetes    Diabetes Daughter         prediabetic    Hypertension Daughter     Obesity Daughter     No Known Problems Daughter     No Known Problems Son     No Known Problems Maternal Aunt     No Known Problems Maternal Uncle     No Known Problems Paternal Aunt     No Known Problems Paternal Uncle     Cancer Maternal Grandmother     Breast cancer Maternal Grandmother     Colon cancer Maternal Grandmother     Colon polyps Maternal Grandmother     No Known Problems Maternal Grandfather     No Known Problems Paternal Grandmother     No Known Problems Paternal Grandfather     Celiac disease Neg Hx     Cirrhosis Neg Hx     Crohn's disease Neg Hx     Cystic fibrosis Neg Hx     Esophageal cancer Neg Hx     Hemochromatosis Neg Hx     Inflammatory bowel disease Neg Hx     Irritable bowel syndrome Neg Hx     Liver cancer Neg Hx     Liver disease Neg Hx     Rectal cancer Neg Hx     Stomach cancer Neg Hx     Ulcerative colitis Neg Hx     Jonnie's disease Neg Hx     Tuberculosis Neg Hx     Lymphoma Neg Hx     Scleroderma Neg Hx     Rheum arthritis Neg Hx     Melanoma Neg Hx     Multiple sclerosis Neg Hx     Psoriasis Neg Hx     Lupus Neg Hx     Skin cancer Neg Hx     Amblyopia Neg Hx     Blindness Neg Hx     Macular degeneration Neg Hx     Retinal detachment Neg Hx     Strabismus Neg Hx     Stroke Neg Hx     Thyroid disease Neg Hx     Allergic rhinitis Neg Hx     Allergies Neg Hx     Angioedema Neg Hx     Asthma Neg Hx     Eczema  "Neg Hx     Immunodeficiency Neg Hx     Rhinitis Neg Hx     Urticaria Neg Hx     Atopy Neg Hx        Social History  Social History     Socioeconomic History    Marital status:     Number of children: 4   Occupational History    Occupation: disable   Tobacco Use    Smoking status: Every Day     Packs/day: 1.00     Years: 27.00     Pack years: 27.00     Types: Cigarettes     Start date: 1/1/1992     Passive exposure: Current    Smokeless tobacco: Never    Tobacco comments:     "1/2 pack per every 2 days or so"   Substance and Sexual Activity    Alcohol use: Yes     Alcohol/week: 1.0 standard drink     Types: 1 Shots of liquor per week     Comment: occasionally    Drug use: No    Sexual activity: Yes     Partners: Male     Birth control/protection: None     Comment:      Social Determinants of Health     Financial Resource Strain: High Risk    Difficulty of Paying Living Expenses: Very hard   Food Insecurity: Food Insecurity Present    Worried About Running Out of Food in the Last Year: Often true    Ran Out of Food in the Last Year: Sometimes true   Transportation Needs: Unmet Transportation Needs    Lack of Transportation (Medical): Yes    Lack of Transportation (Non-Medical): Yes   Physical Activity: Inactive    Days of Exercise per Week: 0 days    Minutes of Exercise per Session: 0 min   Stress: Stress Concern Present    Feeling of Stress : To some extent   Social Connections: Unknown    Frequency of Communication with Friends and Family: Three times a week    Frequency of Social Gatherings with Friends and Family: Once a week    Active Member of Clubs or Organizations: Yes    Attends Club or Organization Meetings: More than 4 times per year    Marital Status:    Housing Stability: High Risk    Unable to Pay for Housing in the Last Year: Yes    Number of Places Lived in the Last Year: 1    Unstable Housing in the Last Year: No       Current Medications  Current Outpatient Medications on File " Prior to Visit   Medication Sig Dispense Refill    (Magic mouthwash) 1:1 Benadryl 12.5mg/5ml liq, mylanta, LIDOcaine viscous 2%, nystatin 100,000u, prednisolone 15mg/5mL, distilled water Swish and swallow 10 mLs every 4 (four) hours as needed (mouth and throat pain). 480 mL 0    albuterol (PROVENTIL) 2.5 mg /3 mL (0.083 %) nebulizer solution Take 3 mLs (2.5 mg total) by nebulization every 6 (six) hours as needed for Wheezing. Rescue 3 mL 11    albuterol (PROVENTIL/VENTOLIN HFA) 90 mcg/actuation inhaler Inhale 2 puffs into the lungs every 6 (six) hours as needed for Wheezing or Shortness of Breath. Rescue 54 g 6    albuterol-ipratropium (DUO-NEB) 2.5 mg-0.5 mg/3 mL nebulizer solution Take 3 mLs by nebulization every 6 (six) hours as needed for Wheezing. Rescue 75 mL 0    alcohol swabs (BD ALCOHOL SWABS) PadM Apply 1 each topically 2 (two) times a day. 200 each 4    amitriptyline (ELAVIL) 10 MG tablet Take 1 tablet (10 mg total) by mouth nightly as needed for Insomnia. 30 tablet 2    ammonium lactate 12 % Crea Apply twice daily to affected parts both feet as needed. 140 g 11    azelastine (ASTELIN) 137 mcg (0.1 %) nasal spray 2 sprays (274 mcg total) by Nasal route 2 (two) times daily. 30 mL 11    blood sugar diagnostic Strp 1 each by Misc.(Non-Drug; Combo Route) route 4 (four) times daily before meals and nightly. ACCU CHEK ELVIA PLUS METER 200 each 3    blood-glucose meter (ACCU-CHEK ELVIA PLUS METER) Misc TEST FOUR TIMES DAILY BEFORE MEALS AND EVERY NIGHT 90 each 1    budesonide-formoterol 160-4.5 mcg (SYMBICORT) 160-4.5 mcg/actuation HFAA Inhale 2 puffs into the lungs every 12 (twelve) hours. Controller 10.2 g 11    cetirizine (ZYRTEC) 10 MG tablet Take 1 tablet (10 mg total) by mouth daily as needed for Allergies (itching). 30 tablet 0    clobetasoL (TEMOVATE) 0.05 % external solution Apply topically 2 (two) times daily. Prn scalp itch or rash 60 mL 1    cyclobenzaprine (FLEXERIL) 10 MG tablet       diclofenac  sodium (VOLTAREN) 1 % Gel Apply 2 g topically 3 (three) times daily. Apply to painful joints 300 g 2    empagliflozin (JARDIANCE) 10 mg tablet Take 1 tablet (10 mg total) by mouth once daily. 90 tablet 1    fluticasone propionate (FLONASE) 50 mcg/actuation nasal spray 2 sprays (100 mcg total) by Each Nostril route 2 (two) times a day. 16 g 11    lancets (ACCU-CHEK FASTCLIX LANCET DRUM) Misc 1 Device by Misc.(Non-Drug; Combo Route) route 2 (two) times a day. 200 each 4    lancets (ACCU-CHEK SOFTCLIX LANCETS) Misc 1 Device by Misc.(Non-Drug; Combo Route) route 4 (four) times daily. 200 each 3    levocetirizine (XYZAL) 5 MG tablet Take 1 tablet (5 mg total) by mouth every evening. 90 tablet 3    lidocaine (LIDODERM) 5 % Place 1 patch onto the skin once daily. Remove & Discard patch within 12 hours or as directed by MD 15 patch 0    LIDOcaine HCL 2% (XYLOCAINE) 2 % jelly Apply topically as needed. Apply topically once nightly to affected part of foot/feet. 30 mL 2    LINZESS 290 mcg Cap capsule Take 290 mcg by mouth once daily.      losartan (COZAAR) 100 MG tablet Take 1 tablet (100 mg total) by mouth once daily. 90 tablet 0    montelukast (SINGULAIR) 10 mg tablet TAKE 1 TABLET(10 MG) BY MOUTH EVERY EVENING 90 tablet 1    MOVANTIK 12.5 mg Tab Take 1 tablet by mouth once daily.      nicotine (NICODERM CQ) 21 mg/24 hr Place 1 patch onto the skin once daily. 28 patch 0    nystatin-triamcinolone (MYCOLOG) ointment Apply topically 2 (two) times daily. 60 g 2    ondansetron (ZOFRAN) 8 MG tablet Take 1 tablet (8 mg total) by mouth every 8 (eight) hours as needed for Nausea. 90 tablet 11    oxyCODONE-acetaminophen (PERCOCET) 7.5-325 mg per tablet Take 1 tablet by mouth 3 (three) times daily as needed for Pain. 90 tablet 0    pantoprazole (PROTONIX) 40 MG tablet TAKE 1 TABLET(40 MG) BY MOUTH TWICE DAILY 180 tablet 0    QUEtiapine (SEROQUEL) 25 MG Tab Take 1 tablet (25 mg total) by mouth once daily. 90 tablet 0    semaglutide 2  mg/dose (8 mg/3 mL) PnIj Inject 2 mg into the skin every 7 days. 9 mL 0    sulindac (CLINORIL) 200 MG Tab Take 1 tablet (200 mg total) by mouth 2 (two) times daily. 30 tablet 0    topiramate (TOPAMAX) 50 MG tablet Take 2 tablets (100 mg total) by mouth every evening. 180 tablet 2    triamcinolone acetonide 0.1% (KENALOG) 0.1 % cream Apply topically 2 (two) times daily. Prn focally for rash spots on forehead and legs.Stop using steroid topical when skin is smooth and non itchy.  Do not treat dark or red coloring. 45 g 0    venlafaxine (EFFEXOR-XR) 75 MG 24 hr capsule Take 1 capsule (75 mg total) by mouth once daily. NO FURTHER REFILL WITHOUT APT 90 capsule 1    EPINEPHrine (EPIPEN) 0.3 mg/0.3 mL AtIn Inject 0.3 mLs (0.3 mg total) into the muscle once. for 1 dose 2 each 1    pregabalin (LYRICA) 50 MG capsule Take 1 capsule (50 mg total) by mouth 3 (three) times daily. 90 capsule 3     Current Facility-Administered Medications on File Prior to Visit   Medication Dose Route Frequency Provider Last Rate Last Admin    albuterol inhaler 2 puff  2 puff Inhalation Q20 Min PRN Carlyle Gordillo MD        diphenhydrAMINE injection 25 mg  25 mg Intravenous Once PRN Carlyle Gordillo MD        EPINEPHrine (EPIPEN) 0.3 mg/0.3 mL pen injection 0.3 mg  0.3 mg Intramuscular PRN Carlyle Gordillo MD        methylPREDNISolone sodium succinate injection 40 mg  40 mg Intravenous Once PRN Carlyle Gordillo MD        ondansetron disintegrating tablet 4 mg  4 mg Oral Once PRN Carlyle Gordillo MD        sodium chloride 0.9% 500 mL flush bag   Intravenous PRN Carlyle Gordillo MD        sodium chloride 0.9% flush 10 mL  10 mL Intravenous PRN Carlyle Gordillo MD           Allergies   Review of patient's allergies indicates:   Allergen Reactions    Gabapentin Other (See Comments)     Makes her twitch       Review of Systems (Pertinent positives)  Review of Systems   Constitutional: Negative.    HENT: Negative.     Eyes: Negative.    Respiratory:   "Positive for cough. Negative for shortness of breath and wheezing.    Cardiovascular: Negative.    Gastrointestinal: Negative.    Musculoskeletal:  Positive for myalgias.   Skin: Negative.    Psychiatric/Behavioral: Negative.         /80   Pulse 83   Temp 98.3 °F (36.8 °C) (Oral)   Resp 18   Ht 5' 4" (1.626 m)   Wt (!) 160.7 kg (354 lb 4.5 oz)   LMP 08/17/2019   SpO2 96%   BMI 60.81 kg/m²     GENERAL APPEARANCE: in no apparent distress and well developed and well nourished  HEENT: PERRL, EOMI, Sclera clear, anicteric, Oropharynx clear, no lesions, Neck supple with midline trachea  NECK: normal, supple, no adenopathy, thyroid normal in size  RESPIRATORY: appears well, vitals normal, no respiratory distress, acyanotic, normal RR, chest clear, no wheezing, crepitations, rhonchi, normal symmetric air entry  HEART: regular rate and rhythm, S1, S2 normal, no murmur, click, rub or gallop.    ABDOMEN: abdomen is soft without tenderness, no masses, no hernias, no organomegaly, no rebound, no guarding. Suprapubic tenderness absent. .  Extremities: warm/well perfused.  No abnormal hair patterns.  No clubbing, cyanosis or edema.  Soft tissue mass to lateral left upper leg without erythema.  SKIN: no rashes, no wounds, no other lesions  PSYCH: Alert, oriented x 3, thought content appropriate, speech normal, pleasant and cooperative, good eye contact, well groomed,    Assessment/Plan:  Yanni Kaiser is a 52 y.o. female who presents today for :    Yanni was seen today for follow-up and leg pain.    Diagnoses and all orders for this visit:    Mass of left lower extremity  -     US Soft Tissue, Lower Extremity, Left; Future    Influenza vaccine needed  -     Influenza - Quadrivalent *Preferred* (6 months+) (PF)    Chronic obstructive pulmonary disease, unspecified COPD type      US to be done ASAP  Flu shot given  Referral placed to pulmonary while in the hospital and was told to get scheduled before she " leaves today  Call with any concerns  Follow up 6 months or sooner if needed      Carlyle Gordillo MD

## 2022-09-30 ENCOUNTER — PATIENT MESSAGE (OUTPATIENT)
Dept: FAMILY MEDICINE | Facility: CLINIC | Age: 52
End: 2022-09-30
Payer: MEDICARE

## 2022-09-30 ENCOUNTER — OUTPATIENT CASE MANAGEMENT (OUTPATIENT)
Dept: ADMINISTRATIVE | Facility: OTHER | Age: 52
End: 2022-09-30
Payer: MEDICARE

## 2022-09-30 ENCOUNTER — TELEPHONE (OUTPATIENT)
Dept: FAMILY MEDICINE | Facility: CLINIC | Age: 52
End: 2022-09-30
Payer: MEDICARE

## 2022-09-30 NOTE — TELEPHONE ENCOUNTER
----- Message from Mague Mane RN sent at 9/30/2022  3:40 PM CDT -----  Marcela this is Mague with [facility]. Pt voiced that she took the flu shot on her last MD appt. Pt voiced that she is now achy and has a HA. Pt voiced that she has not taken anything for the symptoms.    Please advise  Thank you

## 2022-09-30 NOTE — TELEPHONE ENCOUNTER
spoke with she states that she is not feeling well pt states that she will drinkg fluids with chicken noodle soups

## 2022-09-30 NOTE — PROGRESS NOTES
Outpatient Care Management  Plan of Care Follow Up Visit    Patient: Yanni Kaiser  MRN: [id number]  Date of Service: 09/30/2022  Completed by: Mague Mane RN  Referral Date: 09/09/2022    Reason for Visit   Patient presents with    Update Plan Of Care       Brief Summary: Pt reports that she is not feeling well since she took the flu shot at her last MD appt. Pt states that she is achy and has a HA. Pt voiced that she was already having problems with her sinuses. Pt voiced that she has not taken anything for the symptoms. Pt states that she has an appt scheduled for the breathing test.    Patient Summary     Involvement of Care:  Do I have permission to speak with other family members about your care?   yes    Patient Reported Labs & Vitals:  1.  Any Patient Reported Labs & Vitals?     2.  Patient Reported Blood Pressure:     3.  Patient Reported Pulse:     4.  Patient Reported Weight (Kg):     5.  Patient Reported Blood Glucose (mg/dl):       Medical and social history was reviewed with patient and/or caregiver.     Clinical Assessment     Reviewed and provided basic information on available community resources for mental health, transportation, wellness resources, and palliative care programs with patient and/or caregiver.     Complex Care Plan     Care plan was discussed and completed today with input from patient and/or caregiver.    Patient Instructions     Instructions were provided via the [facility] patient resources and are available for the patient to view on the patient portal.    Next Steps: Discuss with pt the disease process.    Follow up in about 1 week (around 10/7/2022) for RN Follow.    Todays [facility] Self-Management Care Plan was developed with the patients/caregivers input and was based on identified barriers from todays assessment.  Goals were written today with the patient/caregiver and the patient has agreed to work towards these goals to improve his/her overall well-being. Patient  verbalized understanding of the care plan, goals, and all of today's instructions. Encouraged patient/caregiver to communicate with his/her physician and health care team about health conditions and the treatment plan.  Provided my contact information today and encouraged patient/caregiver to call me with any questions as needed.

## 2022-10-05 ENCOUNTER — HOSPITAL ENCOUNTER (OUTPATIENT)
Dept: PULMONOLOGY | Facility: CLINIC | Age: 52
Discharge: HOME | End: 2022-10-05
Payer: MEDICARE

## 2022-10-05 ENCOUNTER — OFFICE VISIT (OUTPATIENT)
Dept: PULMONOLOGY | Facility: CLINIC | Age: 52
End: 2022-10-05
Payer: MEDICARE

## 2022-10-05 VITALS
HEART RATE: 98 BPM | HEIGHT: 65 IN | OXYGEN SATURATION: 99 % | WEIGHT: 293 LBS | BODY MASS INDEX: 48.82 KG/M2 | SYSTOLIC BLOOD PRESSURE: 130 MMHG | DIASTOLIC BLOOD PRESSURE: 80 MMHG

## 2022-10-05 DIAGNOSIS — K21.9 GASTROESOPHAGEAL REFLUX DISEASE WITHOUT ESOPHAGITIS: Chronic | ICD-10-CM

## 2022-10-05 DIAGNOSIS — J44.9 CHRONIC OBSTRUCTIVE PULMONARY DISEASE, UNSPECIFIED COPD TYPE: Primary | ICD-10-CM

## 2022-10-05 DIAGNOSIS — J32.9 CHRONIC SINUSITIS, UNSPECIFIED LOCATION: ICD-10-CM

## 2022-10-05 DIAGNOSIS — J44.1 ASTHMA EXACERBATION IN COPD: ICD-10-CM

## 2022-10-05 DIAGNOSIS — U07.1 COVID: ICD-10-CM

## 2022-10-05 DIAGNOSIS — J45.901 ASTHMA EXACERBATION IN COPD: ICD-10-CM

## 2022-10-05 DIAGNOSIS — G40.909 NONINTRACTABLE EPILEPSY WITHOUT STATUS EPILEPTICUS, UNSPECIFIED EPILEPSY TYPE: Chronic | ICD-10-CM

## 2022-10-05 DIAGNOSIS — L29.9 ITCHING: ICD-10-CM

## 2022-10-05 DIAGNOSIS — G47.33 OSA (OBSTRUCTIVE SLEEP APNEA): ICD-10-CM

## 2022-10-05 DIAGNOSIS — R06.00 DYSPNEA, UNSPECIFIED TYPE: ICD-10-CM

## 2022-10-05 DIAGNOSIS — Z78.9 DECREASED ACTIVITIES OF DAILY LIVING (ADL): ICD-10-CM

## 2022-10-05 DIAGNOSIS — F17.210 CIGARETTE NICOTINE DEPENDENCE WITHOUT COMPLICATION: ICD-10-CM

## 2022-10-05 DIAGNOSIS — E11.51 DIABETES MELLITUS WITH PERIPHERAL CIRCULATORY DISORDER: ICD-10-CM

## 2022-10-05 DIAGNOSIS — J45.909 ASTHMA, UNSPECIFIED ASTHMA SEVERITY, UNSPECIFIED WHETHER COMPLICATED, UNSPECIFIED WHETHER PERSISTENT: ICD-10-CM

## 2022-10-05 DIAGNOSIS — Z74.09 IMPAIRED FUNCTIONAL MOBILITY, BALANCE, GAIT, AND ENDURANCE: ICD-10-CM

## 2022-10-05 PROCEDURE — 94060 EVALUATION OF WHEEZING: CPT | Mod: S$GLB,,, | Performed by: INTERNAL MEDICINE

## 2022-10-05 PROCEDURE — 1159F MED LIST DOCD IN RCRD: CPT | Mod: CPTII,S$GLB,, | Performed by: INTERNAL MEDICINE

## 2022-10-05 PROCEDURE — 3044F HG A1C LEVEL LT 7.0%: CPT | Mod: CPTII,S$GLB,, | Performed by: INTERNAL MEDICINE

## 2022-10-05 PROCEDURE — 99204 PR OFFICE/OUTPT VISIT, NEW, LEVL IV, 45-59 MIN: ICD-10-PCS | Mod: S$GLB,,, | Performed by: INTERNAL MEDICINE

## 2022-10-05 PROCEDURE — 3008F BODY MASS INDEX DOCD: CPT | Mod: CPTII,S$GLB,, | Performed by: INTERNAL MEDICINE

## 2022-10-05 PROCEDURE — 94727 GAS DIL/WSHOT DETER LNG VOL: CPT | Mod: S$GLB,,, | Performed by: INTERNAL MEDICINE

## 2022-10-05 PROCEDURE — 94060 PR EVAL OF BRONCHOSPASM: ICD-10-PCS | Mod: S$GLB,,, | Performed by: INTERNAL MEDICINE

## 2022-10-05 PROCEDURE — 3075F SYST BP GE 130 - 139MM HG: CPT | Mod: CPTII,S$GLB,, | Performed by: INTERNAL MEDICINE

## 2022-10-05 PROCEDURE — 3044F PR MOST RECENT HEMOGLOBIN A1C LEVEL <7.0%: ICD-10-PCS | Mod: CPTII,S$GLB,, | Performed by: INTERNAL MEDICINE

## 2022-10-05 PROCEDURE — 3079F DIAST BP 80-89 MM HG: CPT | Mod: CPTII,S$GLB,, | Performed by: INTERNAL MEDICINE

## 2022-10-05 PROCEDURE — 1111F DSCHRG MED/CURRENT MED MERGE: CPT | Mod: CPTII,S$GLB,, | Performed by: INTERNAL MEDICINE

## 2022-10-05 PROCEDURE — 3061F NEG MICROALBUMINURIA REV: CPT | Mod: CPTII,S$GLB,, | Performed by: INTERNAL MEDICINE

## 2022-10-05 PROCEDURE — 94729 PR C02/MEMBANE DIFFUSE CAPACITY: ICD-10-PCS | Mod: S$GLB,,, | Performed by: INTERNAL MEDICINE

## 2022-10-05 PROCEDURE — 94729 DIFFUSING CAPACITY: CPT | Mod: S$GLB,,, | Performed by: INTERNAL MEDICINE

## 2022-10-05 PROCEDURE — 99999 PR PBB SHADOW E&M-EST. PATIENT-LVL V: CPT | Mod: PBBFAC,,, | Performed by: INTERNAL MEDICINE

## 2022-10-05 PROCEDURE — 3061F PR NEG MICROALBUMINURIA RESULT DOCUMENTED/REVIEW: ICD-10-PCS | Mod: CPTII,S$GLB,, | Performed by: INTERNAL MEDICINE

## 2022-10-05 PROCEDURE — 94727 PR PULM FUNCTION TEST BY GAS: ICD-10-PCS | Mod: S$GLB,,, | Performed by: INTERNAL MEDICINE

## 2022-10-05 PROCEDURE — 4010F ACE/ARB THERAPY RXD/TAKEN: CPT | Mod: CPTII,S$GLB,, | Performed by: INTERNAL MEDICINE

## 2022-10-05 PROCEDURE — 1159F PR MEDICATION LIST DOCUMENTED IN MEDICAL RECORD: ICD-10-PCS | Mod: CPTII,S$GLB,, | Performed by: INTERNAL MEDICINE

## 2022-10-05 PROCEDURE — 3075F PR MOST RECENT SYSTOLIC BLOOD PRESS GE 130-139MM HG: ICD-10-PCS | Mod: CPTII,S$GLB,, | Performed by: INTERNAL MEDICINE

## 2022-10-05 PROCEDURE — 3079F PR MOST RECENT DIASTOLIC BLOOD PRESSURE 80-89 MM HG: ICD-10-PCS | Mod: CPTII,S$GLB,, | Performed by: INTERNAL MEDICINE

## 2022-10-05 PROCEDURE — 4010F PR ACE/ARB THEARPY RXD/TAKEN: ICD-10-PCS | Mod: CPTII,S$GLB,, | Performed by: INTERNAL MEDICINE

## 2022-10-05 PROCEDURE — 99204 OFFICE O/P NEW MOD 45 MIN: CPT | Mod: S$GLB,,, | Performed by: INTERNAL MEDICINE

## 2022-10-05 PROCEDURE — 1111F PR DISCHARGE MEDS RECONCILED W/ CURRENT OUTPATIENT MED LIST: ICD-10-PCS | Mod: CPTII,S$GLB,, | Performed by: INTERNAL MEDICINE

## 2022-10-05 PROCEDURE — 3066F PR DOCUMENTATION OF TREATMENT FOR NEPHROPATHY: ICD-10-PCS | Mod: CPTII,S$GLB,, | Performed by: INTERNAL MEDICINE

## 2022-10-05 PROCEDURE — 3066F NEPHROPATHY DOC TX: CPT | Mod: CPTII,S$GLB,, | Performed by: INTERNAL MEDICINE

## 2022-10-05 PROCEDURE — 3008F PR BODY MASS INDEX (BMI) DOCUMENTED: ICD-10-PCS | Mod: CPTII,S$GLB,, | Performed by: INTERNAL MEDICINE

## 2022-10-05 PROCEDURE — 99999 PR PBB SHADOW E&M-EST. PATIENT-LVL V: ICD-10-PCS | Mod: PBBFAC,,, | Performed by: INTERNAL MEDICINE

## 2022-10-05 RX ORDER — FLUTICASONE FUROATE AND VILANTEROL 200; 25 UG/1; UG/1
1 POWDER RESPIRATORY (INHALATION) DAILY
Qty: 1 EACH | Refills: 5 | Status: SHIPPED | OUTPATIENT
Start: 2022-10-05 | End: 2022-12-23 | Stop reason: SDUPTHER

## 2022-10-05 RX ORDER — IPRATROPIUM BROMIDE AND ALBUTEROL SULFATE 2.5; .5 MG/3ML; MG/3ML
3 SOLUTION RESPIRATORY (INHALATION) EVERY 6 HOURS PRN
Qty: 75 ML | Refills: 0 | Status: SHIPPED | OUTPATIENT
Start: 2022-10-05 | End: 2022-11-02 | Stop reason: SDUPTHER

## 2022-10-05 RX ORDER — AZITHROMYCIN 250 MG/1
TABLET, FILM COATED ORAL
Qty: 6 TABLET | Refills: 0 | Status: SHIPPED | OUTPATIENT
Start: 2022-10-05 | End: 2022-10-10

## 2022-10-05 RX ORDER — MONTELUKAST SODIUM 10 MG/1
10 TABLET ORAL NIGHTLY
Qty: 90 TABLET | Refills: 1 | Status: SHIPPED | OUTPATIENT
Start: 2022-10-05 | End: 2022-11-02 | Stop reason: SDUPTHER

## 2022-10-05 RX ORDER — CETIRIZINE HYDROCHLORIDE 10 MG/1
10 TABLET ORAL DAILY PRN
Qty: 30 TABLET | Refills: 0 | Status: SHIPPED | OUTPATIENT
Start: 2022-10-05 | End: 2022-11-02 | Stop reason: SDUPTHER

## 2022-10-05 RX ORDER — METHYLPREDNISOLONE 4 MG/1
TABLET ORAL
Qty: 21 EACH | Refills: 0 | Status: SHIPPED | OUTPATIENT
Start: 2022-10-05 | End: 2022-10-26

## 2022-10-05 RX ORDER — AZELASTINE 1 MG/ML
2 SPRAY, METERED NASAL 2 TIMES DAILY
Qty: 30 ML | Refills: 11 | Status: SHIPPED | OUTPATIENT
Start: 2022-10-05 | End: 2022-11-02 | Stop reason: SDUPTHER

## 2022-10-05 NOTE — ASSESSMENT & PLAN NOTE
Asthma is controlled on symbicort but with seasonal exacerbations, should explore COPD diagnosis  PFTs normal, no bronchodilator response  Stop symbicort in favor of breo  C/w albuterol (MDI and nebs, as she prefers)  Add shoaib  She knows how her flares begin; gave her a rescue rx for z pack and medrol dose pack to start if she starts to get worse not responding to her neb tx, and instructed her to call if she starts taking these  Needs CT chest non-con; have ordered this

## 2022-10-05 NOTE — ASSESSMENT & PLAN NOTE
Discussed at length; her PFTs are normal and CT chest pending  She has been able to quit for several months at a time; she will make a quit date and action plan and we will discuss at next visit  Has only trialed nicotine replacement therapies; might be some utility to wellbutrin or chantix. Has NRT on list, offered whatever tools she will find helpful

## 2022-10-05 NOTE — PROGRESS NOTES
Subjective:       Patient ID: Yanni Kaiser is a 52 y.o. female.    Chief Complaint: COPD    Ms. Kaiser is a 53YO lady with prior history of asthma (symbicort, albuterol), recurrent sinusitis, GERD (hx of recurrent H pylori), seizure disorder (controlled on topamax), DMII (A1C 6.4), daily smoker (30 pack year history), CKD, obesity, mild esophageal dysmotility, presenting with fatigue and NOWAK after hospitalization for covid.    Diagnosed with asthma as an adult; symptoms controlled on symbicort with 1x MDI albuterol use weekly, has albuterol nebs if she has a flare. Experiences flares seasonally; increased wheezing and SOB, usually goes to hospital for continuous nebs and steroids. No intubations. Has had covid 2x, once in  and just recently; was treated with steroids and nebs, no supplemental O2 needed. Had 2x vaccination and 1 booster.   Since the recent covid infection she has been too fatigued to walk and finds herself getting short of breath easily; before she would walk a mile around the track and now can not even start. She is having frequent dry cough as well. Contributions likely from GERD and post-nasal drip, which are maximally managed. Allergy testing positive only for dust; she takes montelukast and zyrtec daily.    PFTs performed: no obstruction, no restriction, DLCO WNL. Spirometry does not meet criteria for response to albuterol (NB that she does take symbicort daily).    Smokinppd, 30 yrs  Other substances: no  Travel: none contributory  Incarceration/homelessness: nil  Occupational/environmental exposures: none  Home life: , 4 children, 7 grandchildren  Recent illness: covid (second infection)  B-Symptoms: no  Autoimmune symptoms:   Intubation Hx: procedurally only  Family Hx: father with heavy smoking and COPD       COPD  Review of Systems    Objective:      Physical Exam   Constitutional: She is oriented to person, place, and time. She appears well-developed. No distress.  She is obese.   HENT:   Head: Normocephalic.   Cardiovascular: Normal rate, regular rhythm and normal heart sounds.   Pulmonary/Chest: Normal expansion and breath sounds normal. No stridor. She has no wheezes. She has no rhonchi.   Abdominal: Soft. There is no abdominal tenderness.   Musculoskeletal:         General: No edema.      Cervical back: Normal range of motion and neck supple.   Lymphadenopathy:     She has no cervical adenopathy.   Neurological: She is alert and oriented to person, place, and time.   Skin: Skin is warm and dry. No rash noted. No cyanosis. Nails show no clubbing.   Psychiatric: She has a normal mood and affect. Her behavior is normal.   Personal Diagnostic Review  Pulmonary function tests  No flowsheet data found.      Assessment:       1. Chronic obstructive pulmonary disease, unspecified COPD type    2. COVID    3. Asthma exacerbation in COPD    4. Dyspnea, unspecified type    5. Chronic sinusitis, unspecified location    6. Itching          Outpatient Encounter Medications as of 10/5/2022   Medication Sig Dispense Refill    (Magic mouthwash) 1:1 Benadryl 12.5mg/5ml liq, mylanta, LIDOcaine viscous 2%, nystatin 100,000u, prednisolone 15mg/5mL, distilled water Swish and swallow 10 mLs every 4 (four) hours as needed (mouth and throat pain). 480 mL 0    albuterol (PROVENTIL) 2.5 mg /3 mL (0.083 %) nebulizer solution Take 3 mLs (2.5 mg total) by nebulization every 6 (six) hours as needed for Wheezing. Rescue 3 mL 11    albuterol (PROVENTIL/VENTOLIN HFA) 90 mcg/actuation inhaler Inhale 2 puffs into the lungs every 6 (six) hours as needed for Wheezing or Shortness of Breath. Rescue 54 g 6    albuterol-ipratropium (DUO-NEB) 2.5 mg-0.5 mg/3 mL nebulizer solution Take 3 mLs by nebulization every 6 (six) hours as needed for Wheezing. Rescue 75 mL 0    alcohol swabs (BD ALCOHOL SWABS) PadM Apply 1 each topically 2 (two) times a day. 200 each 4    amitriptyline (ELAVIL) 10 MG tablet TAKE 1  TABLET(10 MG) BY MOUTH EVERY NIGHT AS NEEDED FOR INSOMNIA 30 tablet 2    ammonium lactate 12 % Crea Apply twice daily to affected parts both feet as needed. 140 g 11    azelastine (ASTELIN) 137 mcg (0.1 %) nasal spray 2 sprays (274 mcg total) by Nasal route 2 (two) times daily. 30 mL 11    blood sugar diagnostic Strp 1 each by Misc.(Non-Drug; Combo Route) route 4 (four) times daily before meals and nightly. ACCU CHEK ELVIA PLUS METER 200 each 3    blood-glucose meter (ACCU-CHEK ELVIA PLUS METER) [facility] [place] TEST FOUR TIMES DAILY BEFORE MEALS AND EVERY NIGHT 90 each 1    cetirizine (ZYRTEC) 10 MG tablet Take 1 tablet (10 mg total) by mouth daily as needed for Allergies (itching). 30 tablet 0    clobetasoL (TEMOVATE) 0.05 % external solution Apply topically 2 (two) times daily. Prn scalp itch or rash 60 mL 1    cyclobenzaprine (FLEXERIL) 10 MG tablet       diclofenac sodium (VOLTAREN) 1 % Gel Apply 2 g topically 3 (three) times daily. Apply to painful joints 300 g 2    empagliflozin (JARDIANCE) 10 mg tablet Take 1 tablet (10 mg total) by mouth once daily. 90 tablet 1    fluticasone propionate (FLONASE) 50 mcg/actuation nasal spray 2 sprays (100 mcg total) by Each Nostril route 2 (two) times a day. 16 g 11    lancets (ACCU-CHEK FASTCLIX LANCET DRUM) Misc 1 Device by Misc.(Non-Drug; Combo Route) route 2 (two) times a day. 200 each 4    lancets (ACCU-CHEK SOFTCLIX LANCETS) Misc 1 Device by Misc.(Non-Drug; Combo Route) route 4 (four) times daily. 200 each 3    levocetirizine (XYZAL) 5 MG tablet Take 1 tablet (5 mg total) by mouth every evening. 90 tablet 3    lidocaine (LIDODERM) 5 % Place 1 patch onto the skin once daily. Remove & Discard patch within 12 hours or as directed by MD 15 patch 0    LIDOcaine HCL 2% (XYLOCAINE) 2 % jelly Apply topically as needed. Apply topically once nightly to affected part of foot/feet. 30 mL 2    LINZESS 290 mcg Cap capsule Take 290 mcg by mouth once daily.      losartan  (COZAAR) 100 MG tablet Take 1 tablet (100 mg total) by mouth once daily. 90 tablet 0    montelukast (SINGULAIR) 10 mg tablet Take 1 tablet (10 mg total) by mouth every evening. 90 tablet 1    MOVANTIK 12.5 mg Tab Take 1 tablet by mouth once daily.      nicotine (NICODERM CQ) 21 mg/24 hr Place 1 patch onto the skin once daily. 28 patch 0    nystatin-triamcinolone (MYCOLOG) ointment Apply topically 2 (two) times daily. 60 g 2    ondansetron (ZOFRAN) 8 MG tablet Take 1 tablet (8 mg total) by mouth every 8 (eight) hours as needed for Nausea. 90 tablet 11    oxyCODONE-acetaminophen (PERCOCET) 7.5-325 mg per tablet Take 1 tablet by mouth 3 (three) times daily as needed for Pain. 90 tablet 0    pantoprazole (PROTONIX) 40 MG tablet TAKE 1 TABLET(40 MG) BY MOUTH TWICE DAILY 180 tablet 0    QUEtiapine (SEROQUEL) 25 MG Tab Take 1 tablet (25 mg total) by mouth once daily. 90 tablet 0    semaglutide 2 mg/dose (8 mg/3 mL) PnIj Inject 2 mg into the skin every 7 days. 9 mL 0    sulindac (CLINORIL) 200 MG Tab Take 1 tablet (200 mg total) by mouth 2 (two) times daily. 30 tablet 0    topiramate (TOPAMAX) 50 MG tablet Take 2 tablets (100 mg total) by mouth every evening. 180 tablet 2    triamcinolone acetonide 0.1% (KENALOG) 0.1 % cream Apply topically 2 (two) times daily. Prn focally for rash spots on forehead and legs.Stop using steroid topical when skin is smooth and non itchy.  Do not treat dark or red coloring. 45 g 0    venlafaxine (EFFEXOR-XR) 75 MG 24 hr capsule Take 1 capsule (75 mg total) by mouth once daily. NO FURTHER REFILL WITHOUT APT 90 capsule 1    [DISCONTINUED] albuterol-ipratropium (DUO-NEB) 2.5 mg-0.5 mg/3 mL nebulizer solution Take 3 mLs by nebulization every 6 (six) hours as needed for Wheezing. Rescue 75 mL 0    [DISCONTINUED] azelastine (ASTELIN) 137 mcg (0.1 %) nasal spray 2 sprays (274 mcg total) by Nasal route 2 (two) times daily. 30 mL 11    [DISCONTINUED] budesonide-formoterol 160-4.5 mcg  (SYMBICORT) 160-4.5 mcg/actuation HFAA Inhale 2 puffs into the lungs every 12 (twelve) hours. Controller 10.2 g 11    [DISCONTINUED] cetirizine (ZYRTEC) 10 MG tablet Take 1 tablet (10 mg total) by mouth daily as needed for Allergies (itching). 30 tablet 0    [DISCONTINUED] montelukast (SINGULAIR) 10 mg tablet TAKE 1 TABLET(10 MG) BY MOUTH EVERY EVENING 90 tablet 1    azithromycin (Z-RHODA) 250 MG tablet Take 2 tablets by mouth on day 1; Take 1 tablet by mouth on days 2-5 6 tablet 0    EPINEPHrine (EPIPEN) 0.3 mg/0.3 mL AtIn Inject 0.3 mLs (0.3 mg total) into the muscle once. for 1 dose 2 each 1    fluticasone furoate-vilanteroL (BREO ELLIPTA) 200-25 mcg/dose DsDv diskus inhaler Inhale 1 puff into the lungs once daily. Controller 1 each 5    methylPREDNISolone (MEDROL DOSEPACK) 4 mg tablet use as directed 21 each 0    pregabalin (LYRICA) 50 MG capsule Take 1 capsule (50 mg total) by mouth 3 (three) times daily. 90 capsule 3    [DISCONTINUED] amitriptyline (ELAVIL) 10 MG tablet Take 1 tablet (10 mg total) by mouth nightly as needed for Insomnia. 30 tablet 2     Facility-Administered Encounter Medications as of 10/5/2022   Medication Dose Route Frequency Provider Last Rate Last Admin    albuterol inhaler 2 puff  2 puff Inhalation Q20 Min PRN Carlyle Gordillo MD        diphenhydrAMINE injection 25 mg  25 mg Intravenous Once PRN Carlyle Gordillo MD        EPINEPHrine (EPIPEN) 0.3 mg/0.3 mL pen injection 0.3 mg  0.3 mg Intramuscular PRN Carlyle Gordillo MD        methylPREDNISolone sodium succinate injection 40 mg  40 mg Intravenous Once PRN Carlyle Gordillo MD        ondansetron disintegrating tablet 4 mg  4 mg Oral Once PRN Carlyle Gordillo MD        sodium chloride 0.9% 500 mL flush bag   Intravenous PRN Carlyle Gordillo MD        sodium chloride 0.9% flush 10 mL  10 mL Intravenous PRN Carlyle Gordillo MD         Orders Placed This Encounter   Procedures    CT Chest Without Contrast     Standing Status:    Future     Standing Expiration Date:   10/5/2023     Order Specific Question:   May the Radiologist modify the order per protocol to meet the clinical needs of the patient?     Answer:   Yes    Ambulatory referral/consult to Pulmonary Rehab     Standing Status:   Future     Standing Expiration Date:   11/5/2023     Referral Priority:   Routine     Referral Type:   Consultation     Referral Reason:   Specialty Services Required     Requested Specialty:   Pulmonary Disease     Number of Visits Requested:   1       Plan:       Problem List Items Addressed This Visit          Neuro    Epilepsy (Chronic)    Current Assessment & Plan     Well controlled per pt on topamax, no f/u with Neurology scheduled            ENT    Sinusitis    Current Assessment & Plan     Frequent f/u with ENT  Refilled astelan  flonase           Relevant Medications    azelastine (ASTELIN) 137 mcg (0.1 %) nasal spray       Pulmonary    Asthma exacerbation in COPD    Current Assessment & Plan     Asthma is controlled on symbicort but with seasonal exacerbations, should explore COPD diagnosis  PFTs normal, no bronchodilator response  Stop symbicort in favor of breo  C/w albuterol (MDI and nebs, as she prefers)  Add duonebs  She knows how her flares begin; gave her a rescue rx for z pack and medrol dose pack to start if she starts to get worse not responding to her neb tx, and instructed her to call if she starts taking these  Needs CT chest non-con; have ordered this         Relevant Medications    albuterol-ipratropium (DUO-NEB) 2.5 mg-0.5 mg/3 mL nebulizer solution       ID    COVID    Current Assessment & Plan     Second time with covid; has experienced fatigue and NOWAK since  Pulmonary Rehab ordered to recondition  CT chest   Instructed to get booster when she is eligible (just got flu shot)            Endocrine    Diabetes mellitus with peripheral circulatory disorder       GI    GERD (gastroesophageal reflux disease) (Chronic)    Current  Assessment & Plan     Continue PPI            Other    SHARATH (obstructive sleep apnea)    Current Assessment & Plan     Can not tolerate any of the masks she has tried; will encourage re-visit to sleep clinic         Cigarette nicotine dependence without complication    Current Assessment & Plan     Discussed at length; her PFTs are normal and CT chest pending  She has been able to quit for several months at a time; she will make a quit date and action plan and we will discuss at next visit  Has only trialed nicotine replacement therapies; might be some utility to wellbutrin or chantix. Has NRT on list, offered whatever tools she will find helpful         Impaired functional mobility, balance, gait, and endurance    Current Assessment & Plan     Pulmonary rehab         Decreased activities of daily living (ADL)    Current Assessment & Plan     Pulmonary rehab          Other Visit Diagnoses       Chronic obstructive pulmonary disease, unspecified COPD type    -  Primary    Dyspnea, unspecified type        Relevant Orders    CT Chest Without Contrast    Ambulatory referral/consult to Pulmonary Rehab    Itching        Relevant Medications    cetirizine (ZYRTEC) 10 MG tablet          RTC December 7th  I will follow-up her CT    Pt seen and plan discussed with Dr. Andrés Fry,  Divina Francois MD  PCCM Fellow PGYIV

## 2022-10-05 NOTE — ASSESSMENT & PLAN NOTE
Second time with covid; has experienced fatigue and NOWAK since  Pulmonary Rehab ordered to recondition  CT chest   Instructed to get booster when she is eligible (just got flu shot)

## 2022-10-06 ENCOUNTER — PATIENT MESSAGE (OUTPATIENT)
Dept: BARIATRICS | Facility: CLINIC | Age: 52
End: 2022-10-06
Payer: MEDICARE

## 2022-10-06 ENCOUNTER — HOSPITAL ENCOUNTER (OUTPATIENT)
Facility: HOSPITAL | Age: 52
Discharge: HOME | End: 2022-10-08
Attending: EMERGENCY MEDICINE | Admitting: EMERGENCY MEDICINE
Payer: MEDICARE

## 2022-10-06 DIAGNOSIS — U07.1 COVID-19 VIRUS DETECTED: ICD-10-CM

## 2022-10-06 DIAGNOSIS — R07.9 CHEST PAIN: ICD-10-CM

## 2022-10-06 DIAGNOSIS — R06.02 SOB (SHORTNESS OF BREATH): ICD-10-CM

## 2022-10-06 LAB
ALBUMIN SERPL BCP-MCNC: 3.4 G/DL (ref 3.5–5.2)
ALP SERPL-CCNC: 73 U/L (ref 55–135)
ALT SERPL W/O P-5'-P-CCNC: 29 U/L (ref 10–44)
ANION GAP SERPL CALC-SCNC: 7 MMOL/L (ref 8–16)
AST SERPL-CCNC: 24 U/L (ref 10–40)
BASOPHILS # BLD AUTO: 0.01 K/UL (ref 0–0.2)
BASOPHILS NFR BLD: 0.2 % (ref 0–1.9)
BILIRUB SERPL-MCNC: 0.4 MG/DL (ref 0.1–1)
BNP SERPL-MCNC: 20 PG/ML (ref 0–99)
BUN SERPL-MCNC: 10 MG/DL (ref 6–20)
CALCIUM SERPL-MCNC: 8.7 MG/DL (ref 8.7–10.5)
CHLORIDE SERPL-SCNC: 105 MMOL/L (ref 95–110)
CO2 SERPL-SCNC: 26 MMOL/L (ref 23–29)
CREAT SERPL-MCNC: 1.3 MG/DL (ref 0.5–1.4)
CTP QC/QA: YES
CTP QC/QA: YES
D DIMER PPP IA.FEU-MCNC: 1.24 MG/L FEU
DIFFERENTIAL METHOD: ABNORMAL
EOSINOPHIL # BLD AUTO: 0.1 K/UL (ref 0–0.5)
EOSINOPHIL NFR BLD: 1.3 % (ref 0–8)
ERYTHROCYTE [DISTWIDTH] IN BLOOD BY AUTOMATED COUNT: 14.1 % (ref 11.5–14.5)
EST. GFR  (NO RACE VARIABLE): 50 ML/MIN/1.73 M^2
GLUCOSE SERPL-MCNC: 111 MG/DL (ref 70–110)
HCT VFR BLD AUTO: 38.2 % (ref 37–48.5)
HGB BLD-MCNC: 12.9 G/DL (ref 12–16)
IMM GRANULOCYTES # BLD AUTO: 0.05 K/UL (ref 0–0.04)
IMM GRANULOCYTES NFR BLD AUTO: 1 % (ref 0–0.5)
LYMPHOCYTES # BLD AUTO: 0.3 K/UL (ref 1–4.8)
LYMPHOCYTES NFR BLD: 5.7 % (ref 18–48)
MCH RBC QN AUTO: 31.5 PG (ref 27–31)
MCHC RBC AUTO-ENTMCNC: 33.8 G/DL (ref 32–36)
MCV RBC AUTO: 93 FL (ref 82–98)
MONOCYTES # BLD AUTO: 0.9 K/UL (ref 0.3–1)
MONOCYTES NFR BLD: 17.6 % (ref 4–15)
NEUTROPHILS # BLD AUTO: 3.9 K/UL (ref 1.8–7.7)
NEUTROPHILS NFR BLD: 74.2 % (ref 38–73)
NRBC BLD-RTO: 0 /100 WBC
PLATELET # BLD AUTO: 248 K/UL (ref 150–450)
PMV BLD AUTO: 8.6 FL (ref 9.2–12.9)
POC MOLECULAR INFLUENZA A AGN: POSITIVE
POC MOLECULAR INFLUENZA B AGN: NEGATIVE
POCT GLUCOSE: 106 MG/DL (ref 70–110)
POTASSIUM SERPL-SCNC: 3.8 MMOL/L (ref 3.5–5.1)
PROT SERPL-MCNC: 7.1 G/DL (ref 6–8.4)
RBC # BLD AUTO: 4.1 M/UL (ref 4–5.4)
SARS-COV-2 RDRP RESP QL NAA+PROBE: POSITIVE
SODIUM SERPL-SCNC: 138 MMOL/L (ref 136–145)
TROPONIN I SERPL DL<=0.01 NG/ML-MCNC: <0.006 NG/ML (ref 0–0.03)
WBC # BLD AUTO: 5.24 K/UL (ref 3.9–12.7)

## 2022-10-06 PROCEDURE — 80053 COMPREHEN METABOLIC PANEL: CPT | Performed by: EMERGENCY MEDICINE

## 2022-10-06 PROCEDURE — 96375 TX/PRO/DX INJ NEW DRUG ADDON: CPT | Mod: 59

## 2022-10-06 PROCEDURE — 85025 COMPLETE CBC W/AUTO DIFF WBC: CPT | Performed by: EMERGENCY MEDICINE

## 2022-10-06 PROCEDURE — 87502 INFLUENZA DNA AMP PROBE: CPT

## 2022-10-06 PROCEDURE — G0378 HOSPITAL OBSERVATION PER HR: HCPCS

## 2022-10-06 PROCEDURE — 93010 EKG 12-LEAD: ICD-10-PCS | Mod: ,,, | Performed by: INTERNAL MEDICINE

## 2022-10-06 PROCEDURE — 99285 EMERGENCY DEPT VISIT HI MDM: CPT | Mod: 25,CS

## 2022-10-06 PROCEDURE — 83880 ASSAY OF NATRIURETIC PEPTIDE: CPT | Performed by: EMERGENCY MEDICINE

## 2022-10-06 PROCEDURE — 87635 SARS-COV-2 COVID-19 AMP PRB: CPT | Performed by: EMERGENCY MEDICINE

## 2022-10-06 PROCEDURE — 94640 AIRWAY INHALATION TREATMENT: CPT | Mod: XB

## 2022-10-06 PROCEDURE — 82962 GLUCOSE BLOOD TEST: CPT

## 2022-10-06 PROCEDURE — 93005 ELECTROCARDIOGRAM TRACING: CPT

## 2022-10-06 PROCEDURE — 25500020 PHARM REV CODE 255: Performed by: EMERGENCY MEDICINE

## 2022-10-06 PROCEDURE — 63600175 PHARM REV CODE 636 W HCPCS: Performed by: EMERGENCY MEDICINE

## 2022-10-06 PROCEDURE — 25000242 PHARM REV CODE 250 ALT 637 W/ HCPCS: Performed by: EMERGENCY MEDICINE

## 2022-10-06 PROCEDURE — 84484 ASSAY OF TROPONIN QUANT: CPT | Performed by: EMERGENCY MEDICINE

## 2022-10-06 PROCEDURE — 96374 THER/PROPH/DIAG INJ IV PUSH: CPT | Mod: 59

## 2022-10-06 PROCEDURE — 85379 FIBRIN DEGRADATION QUANT: CPT | Performed by: EMERGENCY MEDICINE

## 2022-10-06 PROCEDURE — 93010 ELECTROCARDIOGRAM REPORT: CPT | Mod: ,,, | Performed by: INTERNAL MEDICINE

## 2022-10-06 RX ORDER — KETOROLAC TROMETHAMINE 30 MG/ML
10 INJECTION, SOLUTION INTRAMUSCULAR; INTRAVENOUS
Status: COMPLETED | OUTPATIENT
Start: 2022-10-06 | End: 2022-10-06

## 2022-10-06 RX ORDER — PREDNISONE 20 MG/1
60 TABLET ORAL
Status: COMPLETED | OUTPATIENT
Start: 2022-10-06 | End: 2022-10-06

## 2022-10-06 RX ORDER — IPRATROPIUM BROMIDE AND ALBUTEROL SULFATE 2.5; .5 MG/3ML; MG/3ML
3 SOLUTION RESPIRATORY (INHALATION)
Status: COMPLETED | OUTPATIENT
Start: 2022-10-06 | End: 2022-10-06

## 2022-10-06 RX ADMIN — PREDNISONE 60 MG: 20 TABLET ORAL at 07:10

## 2022-10-06 RX ADMIN — KETOROLAC TROMETHAMINE 10 MG: 30 INJECTION, SOLUTION INTRAMUSCULAR at 07:10

## 2022-10-06 RX ADMIN — IPRATROPIUM BROMIDE AND ALBUTEROL SULFATE 3 ML: 2.5; .5 SOLUTION RESPIRATORY (INHALATION) at 07:10

## 2022-10-06 RX ADMIN — IOHEXOL 75 ML: 350 INJECTION, SOLUTION INTRAVENOUS at 10:10

## 2022-10-06 RX ADMIN — IPRATROPIUM BROMIDE AND ALBUTEROL SULFATE 3 ML: 2.5; .5 SOLUTION RESPIRATORY (INHALATION) at 08:10

## 2022-10-06 NOTE — ED PROVIDER NOTES
Encounter Date: 10/6/2022       History     Chief Complaint   Patient presents with    Shortness of Breath     Pt reports SOB, body aches, cough since last night. Reports no relief from inhaler, hx of asthma. Pt denies CP, V/D.      52-year-old female with a history of asthma, COPD, diabetes, influenza, seizures presenting with fever, headache, body aches, shortness of breath, wheezing.  Patient reports she was admitted to the hospital few weeks ago due to a respiratory infection.  She notes she has been wheezing without improvement with her albuterol treatments.  She is unsure if she has had a fever but states she feels like she has had 1.  She notes body aches.  Denies dysuria.  Denies new abdominal pain.  Notes mild nonproductive cough.  Denies chest pain.  Denies dysuria.  Notes moderate headache.  Denies neck pain.  Denies syncope.  Previously in usual state of health.    Review of patient's allergies indicates:   Allergen Reactions    Gabapentin Other (See Comments)     Makes her twitch     Past Medical History:   Diagnosis Date    Allergy     Anemia     Anxiety     Asthma     Back pain     Chronic bronchitis     Chronic obstructive pulmonary disease, unspecified COPD type 4/1/2022    Cigarette smoker     DDD (degenerative disc disease), lumbar 6/9/2020    Depression     Diabetes mellitus with peripheral circulatory disorder 4/1/2022    Diabetes with neurologic complications     DUB (dysfunctional uterine bleeding) 10/16/2018    GERD (gastroesophageal reflux disease)     High cholesterol     Hyperprolactinemia     Hypertension     Influenza A 01/16/2020    Neuromuscular disorder     Obese body habitus     Obesity     Pseudotumor cerebri     Renal manifestation of secondary diabetes mellitus     Respiratory failure     Seizures     Simple endometrial hyperplasia 2018    Sleep apnea     Smoker     Tobacco dependence     Urinary incontinence      Past Surgical History:   Procedure Laterality Date    ANTROSTOMY OF  MAXILLARY SINUS AT INFERIOR NASAL MEATUS  10/22/2021    Procedure: MAXILLARY ANTROSTOMY, AT INFERIOR NASAL MEATUS;  Surgeon: John Prado III, MD;  Location: [facility] OR;  Service: ENT;;    BREAST CYST ASPIRATION       SECTION      X 1    CHOLECYSTECTOMY      COLONOSCOPY      COLONOSCOPY N/A 2019    Procedure: COLONOSCOPY;  Surgeon: Jagruti Sweeney MD;  Location: [facility] (2ND FLR);  Service: Endoscopy;  Laterality: N/A;  BMI is 63/gastroparesis/3 days full liquid diet 1 day clear liquid see telephone encounter by Ciara Pinto [facility]    EPIDURAL STEROID INJECTION N/A 2020    Procedure: INJECTION, STEROID, EPIDURAL, L5-S1 IL;  Surgeon: Lawrence Marin MD;  Location: [facility] PAIN MGT;  Service: Pain Management;  Laterality: N/A;    ESOPHAGEAL MANOMETRY WITH MEASUREMENT OF IMPEDANCE N/A 2019    Procedure: MANOMETRY-ESOPHAGEAL-WITH IMPEDANCE;  Surgeon: Jagruti Sweeney MD;  Location: [facility] (2ND FLR);  Service: Endoscopy;  Laterality: N/A;  Hold Narcotics x 1 days   Hold TCA x 1 days  Propofol only. No fentanyl or benzodiazepine during sedation. If additional sedation needed, discuss with Dr. Sweeney.  10/29 - pt confirmed appt    ESOPHAGOGASTRODUODENOSCOPY N/A 2019    Procedure: EGD (ESOPHAGOGASTRODUODENOSCOPY);  Surgeon: Jagruti Sweeney MD;  Location: [facility] (2ND FLR);  Service: Endoscopy;  Laterality: N/A;  BMI is 63/gastroparesis/3 days full liquid diet 1 day clear liquid see telephone encounter by Ciara Brown     ESOPHAGOGASTRODUODENOSCOPY N/A 2019    Procedure: ESOPHAGOGASTRODUODENOSCOPY (EGD);  Surgeon: Jagruti Sweeney MD;  Location: [facility] (2ND FLR);  Service: Endoscopy;  Laterality: N/A;  EGD with EndoFlip /+/- Botox  2nd floor for gastroparesis/BMI 63 **367lbs**  Bariatric Stretcher needed  Manometry probe to be placed endoscopically during EGD procedure  Due to change in protocol, Full liquid diet for 3 days/ 1 day of clear liquids  Hi     ESOPHAGOGASTRODUODENOSCOPY N/A 12/14/2020    Procedure: ESOPHAGOGASTRODUODENOSCOPY (EGD) with BRAVO;  Surgeon: Jagruti Sweeney MD;  Location: [facility] ENDO (2ND FLR);  Service: Endoscopy;  Laterality: N/A;  with Dilation  2nd floor due to BMI 56.20 (327 lbs) and gastroparesis  3 days full liquid diet and 1 day clears    ESOPHAGOGASTRODUODENOSCOPY N/A 04/15/2021    Procedure: ESOPHAGOGASTRODUODENOSCOPY (EGD);  Surgeon: Jagruti Sweeney MD;  Location: [facility] ENDO (2ND FLR);  Service: Endoscopy;  Laterality: N/A;  Endoflip  2nd floor-gastroparesis, BMI 57.18, bariatric stretcher  full liquid diet x3 days, clear liquid diet x1 day prior to procedure  propofol only  covid test 4/12 primary care, instructions emailed-[facility]    FOOT FRACTURE SURGERY Left     FUNCTIONAL ENDOSCOPIC SINUS SURGERY (FESS) USING COMPUTER-ASSISTED NAVIGATION Bilateral 10/22/2021    Procedure: FESS, USING COMPUTER-ASSISTED NAVIGATION;  Surgeon: John Prado III, MD;  Location: [facility];  Service: ENT;  Laterality: Bilateral;  FOLLOW DR PRADO ANESTHESIA PROTOCAL / pt will stay 23 hour post surgery for SHARATH observation    HYSTEROSCOPY WITH DILATION AND CURETTAGE OF UTERUS N/A 10/16/2018    KJB    INJECTION OF ANESTHETIC AGENT AROUND NERVE Bilateral 10/23/2020    Procedure: BLOCK, NERVE  bilateral L3,4,5 MBB;  Surgeon: Lawrence Marin MD;  Location: [facility] PAIN MGT;  Service: Pain Management;  Laterality: Bilateral;  bilateral L3,4,5 MBB   consent needed    INJECTION OF ANESTHETIC AGENT AROUND NERVE Bilateral 02/18/2022    Procedure: BLOCK, NERVE, L3-L4-L5 MEDIAL BRANCH;  Surgeon: Lawrence Marin MD;  Location: [facility] PAIN MGT;  Service: Pain Management;  Laterality: Bilateral;    PLANTAR FASCIOTOMY Left 11/18/2019    Procedure: FASCIOTOMY, PLANTAR;  Surgeon: Valentino Orourke DPM;  Location: [facility] 2ND FLR;  Service: Podiatry;  Laterality: Left;    RETINAL LASER PROCEDURE Left     SINUS SURGERY      SPHENOIDECTOMY Bilateral 10/22/2021    Procedure:  SPHENOIDECTOMY;  Surgeon: John Prado III, MD;  Location: [facility] OR;  Service: ENT;  Laterality: Bilateral;    TRANSFORAMINAL EPIDURAL INJECTION OF STEROID Bilateral 06/24/2020    Procedure: INJECTION, STEROID, EPIDURAL, TRANSFORAMINAL APPROACH;  Surgeon: Lawrence Marin MD;  Location: [facility] PAIN MGT;  Service: Pain Management;  Laterality: Bilateral;    TRANSFORAMINAL EPIDURAL INJECTION OF STEROID Bilateral 01/28/2022    Procedure: INJECTION, STEROID, EPIDURAL, TRANSFORAMINAL APPROACH  Bilateral TFESI L4/L5      NEEDS CONSENT;  Surgeon: Lawrence Marin MD;  Location: [facility] PAIN MGT;  Service: Pain Management;  Laterality: Bilateral;    UPPER GASTROINTESTINAL ENDOSCOPY       Family History   Problem Relation Age of Onset    Hypertension Mother     Diabetes Mother     Colon cancer Mother 50    Colon polyps Mother     Cataracts Mother     Heart disease Father     COPD Father     Heart attack Father     Hypertension Father     Ulcers Father     Cataracts Father     Glaucoma Father     No Known Problems Brother     Diabetes Daughter         prediabetes    Diabetes Daughter         prediabetic    Hypertension Daughter     Obesity Daughter     No Known Problems Daughter     No Known Problems Son     No Known Problems Maternal Aunt     No Known Problems Maternal Uncle     No Known Problems Paternal Aunt     No Known Problems Paternal Uncle     Cancer Maternal Grandmother     Breast cancer Maternal Grandmother     Colon cancer Maternal Grandmother     Colon polyps Maternal Grandmother     No Known Problems Maternal Grandfather     No Known Problems Paternal Grandmother     No Known Problems Paternal Grandfather     Celiac disease Neg Hx     Cirrhosis Neg Hx     Crohn's disease Neg Hx     Cystic fibrosis Neg Hx     Esophageal cancer Neg Hx     Hemochromatosis Neg Hx     Inflammatory bowel disease Neg Hx     Irritable bowel syndrome Neg Hx     Liver cancer Neg Hx     Liver disease Neg Hx     Rectal cancer Neg Hx     Stomach  "cancer Neg Hx     Ulcerative colitis Neg Hx     Jonnie's disease Neg Hx     Tuberculosis Neg Hx     Lymphoma Neg Hx     Scleroderma Neg Hx     Rheum arthritis Neg Hx     Melanoma Neg Hx     Multiple sclerosis Neg Hx     Psoriasis Neg Hx     Lupus Neg Hx     Skin cancer Neg Hx     Amblyopia Neg Hx     Blindness Neg Hx     Macular degeneration Neg Hx     Retinal detachment Neg Hx     Strabismus Neg Hx     Stroke Neg Hx     Thyroid disease Neg Hx     Allergic rhinitis Neg Hx     Allergies Neg Hx     Angioedema Neg Hx     Asthma Neg Hx     Eczema Neg Hx     Immunodeficiency Neg Hx     Rhinitis Neg Hx     Urticaria Neg Hx     Atopy Neg Hx      Social History     Tobacco Use    Smoking status: Every Day     Packs/day: 1.00     Years: 27.00     Pack years: 27.00     Types: Cigarettes     Start date: 1/1/1992     Passive exposure: Current    Smokeless tobacco: Never    Tobacco comments:     "1/2 pack per every 2 days or so"   Substance Use Topics    Alcohol use: Yes     Alcohol/week: 1.0 standard drink     Types: 1 Shots of liquor per week     Comment: occasionally    Drug use: No     Review of Systems   Constitutional:  Positive for chills and fever.   HENT:  Negative for sore throat.    Eyes:  Negative for photophobia.   Respiratory:  Positive for cough, shortness of breath and wheezing.    Cardiovascular:  Negative for chest pain.   Gastrointestinal:  Negative for nausea and vomiting.   Genitourinary:  Negative for dysuria.   Musculoskeletal:  Negative for back pain.   Skin:  Negative for rash.   Neurological:  Positive for headaches. Negative for weakness.   Hematological:  Does not bruise/bleed easily.     Physical Exam     Initial Vitals [10/06/22 1832]   BP Pulse Resp Temp SpO2   (!) 196/98 108 (!) 22 100 °F (37.8 °C) (!) 93 %      MAP       --         Physical Exam    Nursing note and vitals reviewed.  Constitutional: She appears well-developed and well-nourished. She is not diaphoretic. No distress.   HENT:   Head: " Normocephalic and atraumatic.   Nose: Nose normal.   Eyes: EOM are normal. Pupils are equal, round, and reactive to light. No scleral icterus.   Neck: Neck supple.   Normal range of motion.  Cardiovascular:  Normal rate, regular rhythm, normal heart sounds and intact distal pulses.     Exam reveals no gallop and no friction rub.       No murmur heard.  Pulmonary/Chest: No stridor. No respiratory distress. She has wheezes. She has no rhonchi. She has no rales.   Abdominal: Abdomen is soft. Bowel sounds are normal. She exhibits no distension. There is no abdominal tenderness. There is no rebound and no guarding.   Musculoskeletal:         General: No tenderness or edema. Normal range of motion.      Cervical back: Normal range of motion and neck supple.     Neurological: She is alert and oriented to person, place, and time. No cranial nerve deficit. GCS score is 15. GCS eye subscore is 4. GCS verbal subscore is 5. GCS motor subscore is 6.   Skin: Skin is warm and dry. No rash noted.   Psychiatric: She has a normal mood and affect. Her behavior is normal.       ED Course   Procedures  Labs Reviewed   CBC W/ AUTO DIFFERENTIAL - Abnormal; Notable for the following components:       Result Value    MCH 31.5 (*)     MPV 8.6 (*)     Immature Granulocytes 1.0 (*)     Immature Grans (Abs) 0.05 (*)     Lymph # 0.3 (*)     Gran % 74.2 (*)     Lymph % 5.7 (*)     Mono % 17.6 (*)     All other components within normal limits   COMPREHENSIVE METABOLIC PANEL - Abnormal; Notable for the following components:    Glucose 111 (*)     Albumin 3.4 (*)     Anion Gap 7 (*)     eGFR 50 (*)     All other components within normal limits   D DIMER, QUANTITATIVE - Abnormal; Notable for the following components:    D-Dimer 1.24 (*)     All other components within normal limits   SARS-COV-2 RDRP GENE - Abnormal; Notable for the following components:    POC Rapid COVID Positive (*)     All other components within normal limits   POCT INFLUENZA A/B  MOLECULAR - Abnormal; Notable for the following components:    POC Molecular Influenza A Ag Positive (*)     All other components within normal limits   TROPONIN I   B-TYPE NATRIURETIC PEPTIDE   CBC W/ AUTO DIFFERENTIAL   BASIC METABOLIC PANEL   MAGNESIUM   POCT GLUCOSE          Imaging Results              CTA Chest Non-Coronary (PE Studies) (Final result)  Result time 10/06/22 23:32:29      Final result by Tiago Mendenhall MD (10/06/22 23:32:29)                   Impression:      1. No evidence of PE.  2. Minimal patchy ground-glass opacities more pronounced in the lower lobes and may be secondary to respiratory motion, however noting that mild infectious or inflammatory process could present with similar appearance.  No focal consolidation.      Electronically signed by: Tiago Mendenhall MD  Date:    10/06/2022  Time:    23:32               Narrative:    EXAMINATION:  CTA CHEST NON CORONARY (PE STUDIES)    CLINICAL HISTORY:  Pulmonary embolism (PE) suspected, positive D-dimer;    TECHNIQUE:  Low dose axial images, sagittal and coronal reformations were obtained from the thoracic inlet to the lung bases following the IV administration of 75 mL of Omnipaque 350.  Contrast timing was optimized to evaluate the pulmonary arteries.  MIP images were performed.    COMPARISON:  CTA chest from June 2018.    FINDINGS:  Structures at the base of the neck are unremarkable.  Aorta is non-aneurysmal.  The heart is normal in size without pericardial effusion.  No intraluminal filling defects within the pulmonary arteries to suggest pulmonary thromboembolism to the level of the proximal segmental branches.  There is no evidence of mediastinal, axillary, or hilar lymph node enlargement.  The esophagus is unremarkable along its course.    The trachea and bronchi are patent.  The lungs are symmetrically expanded.  Minimal patchy ground-glass opacities are seen more pronounced in the lower lobes and may be secondary to respiratory  motion.  No focal consolidation.  No pleural effusion.  No evidence to suggest pulmonary hemorrhage or infarction.    The visualized abdominal structures show no acute abnormalities.  No acute osseous abnormality identified.  Extrathoracic soft tissues are unremarkable.                                       X-Ray Chest AP Portable (Final result)  Result time 10/06/22 18:56:16      Final result by Tiago Mendenhall MD (10/06/22 18:56:16)                   Impression:      No acute cardiopulmonary process identified.      Electronically signed by: Tiago Mendenhall MD  Date:    10/06/2022  Time:    18:56               Narrative:    EXAMINATION:  XR CHEST AP PORTABLE    CLINICAL HISTORY:  Chest Pain;    TECHNIQUE:  Single frontal view of the chest was performed.    COMPARISON:  09/08/2022.    FINDINGS:  Cardiac silhouette is stable in size.  Lungs are symmetrically expanded.  No evidence of new focal consolidative process, pneumothorax, or significant pleural effusion.  No acute osseous abnormality identified.                                       Medications   sodium chloride 0.9% flush 10 mL (has no administration in time range)   melatonin tablet 6 mg (has no administration in time range)   senna-docusate 8.6-50 mg per tablet 1 tablet (has no administration in time range)   acetaminophen tablet 650 mg (has no administration in time range)   naloxone 0.4 mg/mL injection 0.02 mg (has no administration in time range)   predniSONE tablet 60 mg (60 mg Oral Given 10/6/22 1924)   albuterol-ipratropium 2.5 mg-0.5 mg/3 mL nebulizer solution 3 mL (3 mLs Nebulization Given 10/6/22 2001)   ketorolac injection 9.999 mg (9.999 mg Intravenous Given 10/6/22 1944)   iohexoL (OMNIPAQUE 350) injection 75 mL (75 mLs Intravenous Given 10/6/22 2258)     Medical Decision Making:   Initial Assessment:   53 yo female p/w body aches, fever. Moderate wheezing on exam. Appears non-toxic, though slightly ill-feeling. No acute distress. Reports  recent hospitalization.  History of COPD.  Will give Solu-Medrol, neb treatments.  ED Management:  Labs reveal flu and COVID.  Chest x-ray with questionable pneumonia.  Patient is still wheezing, tachypneic.  Borderline O2 sat.  Given persistent COPD, flu, possible pneumonia, patient will be brought into the hospital for further evaluation and treatment.                        Clinical Impression:   Final diagnoses:  [R06.02] SOB (shortness of breath)  [R07.9] Chest pain        ED Disposition Condition    Observation Stable                Konstantin Chiang MD  10/07/22 [id number]

## 2022-10-07 PROBLEM — J12.82 PNEUMONIA DUE TO COVID-19 VIRUS: Status: ACTIVE | Noted: 2022-09-08

## 2022-10-07 PROBLEM — U07.1 COVID-19 VIRUS INFECTION: Status: ACTIVE | Noted: 2022-10-07

## 2022-10-07 LAB
ANION GAP SERPL CALC-SCNC: 10 MMOL/L (ref 8–16)
APTT BLDCRRT: 30.9 SEC (ref 21–32)
BASOPHILS # BLD AUTO: 0.01 K/UL (ref 0–0.2)
BASOPHILS # BLD AUTO: 0.01 K/UL (ref 0–0.2)
BASOPHILS NFR BLD: 0.2 % (ref 0–1.9)
BASOPHILS NFR BLD: 0.2 % (ref 0–1.9)
BUN SERPL-MCNC: 13 MG/DL (ref 6–20)
CALCIUM SERPL-MCNC: 9.2 MG/DL (ref 8.7–10.5)
CHLORIDE SERPL-SCNC: 105 MMOL/L (ref 95–110)
CK SERPL-CCNC: 104 U/L (ref 20–180)
CO2 SERPL-SCNC: 21 MMOL/L (ref 23–29)
CREAT SERPL-MCNC: 1.2 MG/DL (ref 0.5–1.4)
DIFFERENTIAL METHOD: ABNORMAL
DIFFERENTIAL METHOD: ABNORMAL
EOSINOPHIL # BLD AUTO: 0 K/UL (ref 0–0.5)
EOSINOPHIL # BLD AUTO: 0 K/UL (ref 0–0.5)
EOSINOPHIL NFR BLD: 0 % (ref 0–8)
EOSINOPHIL NFR BLD: 0 % (ref 0–8)
ERYTHROCYTE [DISTWIDTH] IN BLOOD BY AUTOMATED COUNT: 14 % (ref 11.5–14.5)
ERYTHROCYTE [DISTWIDTH] IN BLOOD BY AUTOMATED COUNT: 14 % (ref 11.5–14.5)
ERYTHROCYTE [SEDIMENTATION RATE] IN BLOOD BY WESTERGREN METHOD: 23 MM/HR (ref 0–20)
EST. GFR  (NO RACE VARIABLE): 55 ML/MIN/1.73 M^2
FERRITIN SERPL-MCNC: 101 NG/ML (ref 20–300)
GLUCOSE SERPL-MCNC: 146 MG/DL (ref 70–110)
HCT VFR BLD AUTO: 40.5 % (ref 37–48.5)
HCT VFR BLD AUTO: 40.5 % (ref 37–48.5)
HGB BLD-MCNC: 13.7 G/DL (ref 12–16)
HGB BLD-MCNC: 13.7 G/DL (ref 12–16)
IMM GRANULOCYTES # BLD AUTO: 0.09 K/UL (ref 0–0.04)
IMM GRANULOCYTES # BLD AUTO: 0.09 K/UL (ref 0–0.04)
IMM GRANULOCYTES NFR BLD AUTO: 2 % (ref 0–0.5)
IMM GRANULOCYTES NFR BLD AUTO: 2 % (ref 0–0.5)
INR PPP: 1 (ref 0.8–1.2)
LACTATE SERPL-SCNC: 0.9 MMOL/L (ref 0.5–2.2)
LDH SERPL L TO P-CCNC: 261 U/L (ref 110–260)
LYMPHOCYTES # BLD AUTO: 0.3 K/UL (ref 1–4.8)
LYMPHOCYTES # BLD AUTO: 0.3 K/UL (ref 1–4.8)
LYMPHOCYTES NFR BLD: 7 % (ref 18–48)
LYMPHOCYTES NFR BLD: 7 % (ref 18–48)
MAGNESIUM SERPL-MCNC: 2.1 MG/DL (ref 1.6–2.6)
MCH RBC QN AUTO: 31.3 PG (ref 27–31)
MCH RBC QN AUTO: 31.3 PG (ref 27–31)
MCHC RBC AUTO-ENTMCNC: 33.8 G/DL (ref 32–36)
MCHC RBC AUTO-ENTMCNC: 33.8 G/DL (ref 32–36)
MCV RBC AUTO: 93 FL (ref 82–98)
MCV RBC AUTO: 93 FL (ref 82–98)
MONOCYTES # BLD AUTO: 0.3 K/UL (ref 0.3–1)
MONOCYTES # BLD AUTO: 0.3 K/UL (ref 0.3–1)
MONOCYTES NFR BLD: 6.1 % (ref 4–15)
MONOCYTES NFR BLD: 6.1 % (ref 4–15)
NEUTROPHILS # BLD AUTO: 3.8 K/UL (ref 1.8–7.7)
NEUTROPHILS # BLD AUTO: 3.8 K/UL (ref 1.8–7.7)
NEUTROPHILS NFR BLD: 84.7 % (ref 38–73)
NEUTROPHILS NFR BLD: 84.7 % (ref 38–73)
NRBC BLD-RTO: 0 /100 WBC
NRBC BLD-RTO: 0 /100 WBC
PLATELET # BLD AUTO: 252 K/UL (ref 150–450)
PLATELET # BLD AUTO: 252 K/UL (ref 150–450)
PMV BLD AUTO: 8.6 FL (ref 9.2–12.9)
PMV BLD AUTO: 8.6 FL (ref 9.2–12.9)
POTASSIUM SERPL-SCNC: 4 MMOL/L (ref 3.5–5.1)
PROTHROMBIN TIME: 10.5 SEC (ref 9–12.5)
RBC # BLD AUTO: 4.38 M/UL (ref 4–5.4)
RBC # BLD AUTO: 4.38 M/UL (ref 4–5.4)
SODIUM SERPL-SCNC: 136 MMOL/L (ref 136–145)
WBC # BLD AUTO: 4.44 K/UL (ref 3.9–12.7)
WBC # BLD AUTO: 4.44 K/UL (ref 3.9–12.7)

## 2022-10-07 PROCEDURE — 85025 COMPLETE CBC W/AUTO DIFF WBC: CPT | Performed by: PHYSICIAN ASSISTANT

## 2022-10-07 PROCEDURE — 82550 ASSAY OF CK (CPK): CPT | Performed by: PHYSICIAN ASSISTANT

## 2022-10-07 PROCEDURE — 85610 PROTHROMBIN TIME: CPT | Performed by: PHYSICIAN ASSISTANT

## 2022-10-07 PROCEDURE — 96365 THER/PROPH/DIAG IV INF INIT: CPT

## 2022-10-07 PROCEDURE — 94760 N-INVAS EAR/PLS OXIMETRY 1: CPT

## 2022-10-07 PROCEDURE — 83735 ASSAY OF MAGNESIUM: CPT | Performed by: PHYSICIAN ASSISTANT

## 2022-10-07 PROCEDURE — 83605 ASSAY OF LACTIC ACID: CPT | Performed by: PHYSICIAN ASSISTANT

## 2022-10-07 PROCEDURE — 25000003 PHARM REV CODE 250: Performed by: PHYSICIAN ASSISTANT

## 2022-10-07 PROCEDURE — 27000190 HC CPAP FULL FACE MASK W/VALVE

## 2022-10-07 PROCEDURE — 25000242 PHARM REV CODE 250 ALT 637 W/ HCPCS: Performed by: STUDENT IN AN ORGANIZED HEALTH CARE EDUCATION/TRAINING PROGRAM

## 2022-10-07 PROCEDURE — G0378 HOSPITAL OBSERVATION PER HR: HCPCS

## 2022-10-07 PROCEDURE — 99900035 HC TECH TIME PER 15 MIN (STAT)

## 2022-10-07 PROCEDURE — A4216 STERILE WATER/SALINE, 10 ML: HCPCS | Performed by: PHYSICIAN ASSISTANT

## 2022-10-07 PROCEDURE — 94660 CPAP INITIATION&MGMT: CPT

## 2022-10-07 PROCEDURE — 96372 THER/PROPH/DIAG INJ SC/IM: CPT | Mod: 59 | Performed by: PHYSICIAN ASSISTANT

## 2022-10-07 PROCEDURE — 94640 AIRWAY INHALATION TREATMENT: CPT

## 2022-10-07 PROCEDURE — 63600175 PHARM REV CODE 636 W HCPCS: Mod: TB | Performed by: PHYSICIAN ASSISTANT

## 2022-10-07 PROCEDURE — 82728 ASSAY OF FERRITIN: CPT | Performed by: PHYSICIAN ASSISTANT

## 2022-10-07 PROCEDURE — 85730 THROMBOPLASTIN TIME PARTIAL: CPT | Performed by: PHYSICIAN ASSISTANT

## 2022-10-07 PROCEDURE — 83615 LACTATE (LD) (LDH) ENZYME: CPT | Performed by: PHYSICIAN ASSISTANT

## 2022-10-07 PROCEDURE — 85652 RBC SED RATE AUTOMATED: CPT | Performed by: PHYSICIAN ASSISTANT

## 2022-10-07 PROCEDURE — 80048 BASIC METABOLIC PNL TOTAL CA: CPT | Performed by: PHYSICIAN ASSISTANT

## 2022-10-07 PROCEDURE — 25000003 PHARM REV CODE 250: Performed by: STUDENT IN AN ORGANIZED HEALTH CARE EDUCATION/TRAINING PROGRAM

## 2022-10-07 PROCEDURE — 94640 AIRWAY INHALATION TREATMENT: CPT | Mod: XB

## 2022-10-07 PROCEDURE — 25000242 PHARM REV CODE 250 ALT 637 W/ HCPCS: Performed by: PHYSICIAN ASSISTANT

## 2022-10-07 RX ORDER — AMITRIPTYLINE HYDROCHLORIDE 10 MG/1
10 TABLET, FILM COATED ORAL NIGHTLY PRN
Status: DISCONTINUED | OUTPATIENT
Start: 2022-10-07 | End: 2022-10-08 | Stop reason: HOSPADM

## 2022-10-07 RX ORDER — OXYCODONE AND ACETAMINOPHEN 7.5; 325 MG/1; MG/1
1 TABLET ORAL EVERY 6 HOURS PRN
Status: DISCONTINUED | OUTPATIENT
Start: 2022-10-07 | End: 2022-10-08 | Stop reason: HOSPADM

## 2022-10-07 RX ORDER — SODIUM CHLORIDE 0.9 % (FLUSH) 0.9 %
10 SYRINGE (ML) INJECTION EVERY 8 HOURS
Status: DISCONTINUED | OUTPATIENT
Start: 2022-10-07 | End: 2022-10-08 | Stop reason: HOSPADM

## 2022-10-07 RX ORDER — SUMATRIPTAN SUCCINATE 25 MG/1
50 TABLET ORAL
Status: DISCONTINUED | OUTPATIENT
Start: 2022-10-07 | End: 2022-10-08 | Stop reason: HOSPADM

## 2022-10-07 RX ORDER — IBUPROFEN 200 MG
24 TABLET ORAL
Status: DISCONTINUED | OUTPATIENT
Start: 2022-10-07 | End: 2022-10-08 | Stop reason: HOSPADM

## 2022-10-07 RX ORDER — ALBUTEROL SULFATE 90 UG/1
2 AEROSOL, METERED RESPIRATORY (INHALATION) EVERY 6 HOURS PRN
Status: DISCONTINUED | OUTPATIENT
Start: 2022-10-07 | End: 2022-10-08 | Stop reason: HOSPADM

## 2022-10-07 RX ORDER — ALBUTEROL SULFATE 90 UG/1
2 AEROSOL, METERED RESPIRATORY (INHALATION) 2 TIMES DAILY
Status: DISCONTINUED | OUTPATIENT
Start: 2022-10-07 | End: 2022-10-08 | Stop reason: HOSPADM

## 2022-10-07 RX ORDER — ACETAMINOPHEN 325 MG/1
650 TABLET ORAL EVERY 6 HOURS PRN
Status: DISCONTINUED | OUTPATIENT
Start: 2022-10-07 | End: 2022-10-07

## 2022-10-07 RX ORDER — TOPIRAMATE 100 MG/1
100 TABLET, FILM COATED ORAL NIGHTLY
Status: DISCONTINUED | OUTPATIENT
Start: 2022-10-07 | End: 2022-10-08 | Stop reason: HOSPADM

## 2022-10-07 RX ORDER — TALC
6 POWDER (GRAM) TOPICAL NIGHTLY PRN
Status: DISCONTINUED | OUTPATIENT
Start: 2022-10-07 | End: 2022-10-08 | Stop reason: HOSPADM

## 2022-10-07 RX ORDER — ACETAMINOPHEN 500 MG
1000 TABLET ORAL EVERY 6 HOURS PRN
Status: DISCONTINUED | OUTPATIENT
Start: 2022-10-07 | End: 2022-10-08 | Stop reason: HOSPADM

## 2022-10-07 RX ORDER — IBUPROFEN 200 MG
1 TABLET ORAL DAILY
Status: DISCONTINUED | OUTPATIENT
Start: 2022-10-08 | End: 2022-10-08 | Stop reason: HOSPADM

## 2022-10-07 RX ORDER — ENOXAPARIN SODIUM 100 MG/ML
1 INJECTION SUBCUTANEOUS 2 TIMES DAILY
Status: DISCONTINUED | OUTPATIENT
Start: 2022-10-07 | End: 2022-10-08 | Stop reason: HOSPADM

## 2022-10-07 RX ORDER — FLUTICASONE FUROATE AND VILANTEROL 100; 25 UG/1; UG/1
1 POWDER RESPIRATORY (INHALATION) DAILY
Status: DISCONTINUED | OUTPATIENT
Start: 2022-10-08 | End: 2022-10-08 | Stop reason: HOSPADM

## 2022-10-07 RX ORDER — AMOXICILLIN 250 MG
1 CAPSULE ORAL 2 TIMES DAILY PRN
Status: DISCONTINUED | OUTPATIENT
Start: 2022-10-07 | End: 2022-10-08 | Stop reason: HOSPADM

## 2022-10-07 RX ORDER — SODIUM CHLORIDE 0.9 % (FLUSH) 0.9 %
10 SYRINGE (ML) INJECTION
Status: DISCONTINUED | OUTPATIENT
Start: 2022-10-07 | End: 2022-10-08 | Stop reason: HOSPADM

## 2022-10-07 RX ORDER — VENLAFAXINE HYDROCHLORIDE 37.5 MG/1
75 CAPSULE, EXTENDED RELEASE ORAL DAILY
Status: DISCONTINUED | OUTPATIENT
Start: 2022-10-08 | End: 2022-10-08 | Stop reason: HOSPADM

## 2022-10-07 RX ORDER — FLUTICASONE PROPIONATE 50 MCG
2 SPRAY, SUSPENSION (ML) NASAL 2 TIMES DAILY
Status: DISCONTINUED | OUTPATIENT
Start: 2022-10-07 | End: 2022-10-08 | Stop reason: HOSPADM

## 2022-10-07 RX ORDER — CETIRIZINE HYDROCHLORIDE 10 MG/1
10 TABLET ORAL DAILY
Status: DISCONTINUED | OUTPATIENT
Start: 2022-10-08 | End: 2022-10-08 | Stop reason: HOSPADM

## 2022-10-07 RX ORDER — PREGABALIN 50 MG/1
50 CAPSULE ORAL 3 TIMES DAILY
Status: DISCONTINUED | OUTPATIENT
Start: 2022-10-07 | End: 2022-10-08 | Stop reason: HOSPADM

## 2022-10-07 RX ORDER — IBUPROFEN 200 MG
16 TABLET ORAL
Status: DISCONTINUED | OUTPATIENT
Start: 2022-10-07 | End: 2022-10-08 | Stop reason: HOSPADM

## 2022-10-07 RX ORDER — ASCORBIC ACID 500 MG
500 TABLET ORAL 2 TIMES DAILY
Status: DISCONTINUED | OUTPATIENT
Start: 2022-10-07 | End: 2022-10-08 | Stop reason: HOSPADM

## 2022-10-07 RX ORDER — GLUCAGON 1 MG
1 KIT INJECTION
Status: DISCONTINUED | OUTPATIENT
Start: 2022-10-07 | End: 2022-10-08 | Stop reason: HOSPADM

## 2022-10-07 RX ORDER — OSELTAMIVIR PHOSPHATE 75 MG/1
75 CAPSULE ORAL 2 TIMES DAILY
Status: DISCONTINUED | OUTPATIENT
Start: 2022-10-07 | End: 2022-10-08 | Stop reason: HOSPADM

## 2022-10-07 RX ORDER — QUETIAPINE FUMARATE 25 MG/1
25 TABLET, FILM COATED ORAL DAILY
Status: DISCONTINUED | OUTPATIENT
Start: 2022-10-08 | End: 2022-10-08 | Stop reason: HOSPADM

## 2022-10-07 RX ORDER — NALOXONE HCL 0.4 MG/ML
0.02 VIAL (ML) INJECTION
Status: DISCONTINUED | OUTPATIENT
Start: 2022-10-07 | End: 2022-10-08 | Stop reason: HOSPADM

## 2022-10-07 RX ORDER — ALBUTEROL SULFATE 90 UG/1
2 AEROSOL, METERED RESPIRATORY (INHALATION) EVERY 6 HOURS
Status: DISCONTINUED | OUTPATIENT
Start: 2022-10-07 | End: 2022-10-07

## 2022-10-07 RX ADMIN — OXYCODONE HYDROCHLORIDE AND ACETAMINOPHEN 500 MG: 500 TABLET ORAL at 10:10

## 2022-10-07 RX ADMIN — OXYCODONE AND ACETAMINOPHEN 1 TABLET: 7.5; 325 TABLET ORAL at 04:10

## 2022-10-07 RX ADMIN — Medication 6 MG: at 10:10

## 2022-10-07 RX ADMIN — OXYCODONE HYDROCHLORIDE AND ACETAMINOPHEN 500 MG: 500 TABLET ORAL at 09:10

## 2022-10-07 RX ADMIN — FLUTICASONE PROPIONATE 100 MCG: 50 SPRAY, METERED NASAL at 09:10

## 2022-10-07 RX ADMIN — REMDESIVIR 200 MG: 100 INJECTION, POWDER, LYOPHILIZED, FOR SOLUTION INTRAVENOUS at 09:10

## 2022-10-07 RX ADMIN — PREGABALIN 50 MG: 50 CAPSULE ORAL at 10:10

## 2022-10-07 RX ADMIN — Medication 10 ML: at 02:10

## 2022-10-07 RX ADMIN — Medication 10 ML: at 10:10

## 2022-10-07 RX ADMIN — THERA TABS 1 TABLET: TAB at 09:10

## 2022-10-07 RX ADMIN — OSELTAMIVIR PHOSPHATE 75 MG: 75 CAPSULE ORAL at 10:10

## 2022-10-07 RX ADMIN — Medication 6 MG: at 02:10

## 2022-10-07 RX ADMIN — ENOXAPARIN SODIUM 160 MG: 100 INJECTION SUBCUTANEOUS at 10:10

## 2022-10-07 RX ADMIN — DEXAMETHASONE 6 MG: 2 TABLET ORAL at 09:10

## 2022-10-07 RX ADMIN — ACETAMINOPHEN 1000 MG: 500 TABLET ORAL at 09:10

## 2022-10-07 RX ADMIN — ALBUTEROL SULFATE 2 PUFF: 90 AEROSOL, METERED RESPIRATORY (INHALATION) at 09:10

## 2022-10-07 RX ADMIN — TOPIRAMATE 100 MG: 100 TABLET, FILM COATED ORAL at 10:10

## 2022-10-07 RX ADMIN — ACETAMINOPHEN 650 MG: 325 TABLET ORAL at 02:10

## 2022-10-07 RX ADMIN — Medication 10 ML: at 05:10

## 2022-10-07 RX ADMIN — ENOXAPARIN SODIUM 160 MG: 100 INJECTION SUBCUTANEOUS at 09:10

## 2022-10-07 RX ADMIN — OSELTAMIVIR PHOSPHATE 75 MG: 75 CAPSULE ORAL at 09:10

## 2022-10-07 RX ADMIN — ALBUTEROL SULFATE 2 PUFF: 90 AEROSOL, METERED RESPIRATORY (INHALATION) at 08:10

## 2022-10-07 NOTE — PLAN OF CARE
[facility] - Telemetry  Discharge Assessment    Primary Care Provider: Carlyle Gordillo MD     CM discussed discharge planning with pt's , Mathieu. Assessment completed and role of CM discussed. Plan A ( home with family) and Plan B ( other- tbd).     Discharge Assessment (most recent)       BRIEF DISCHARGE ASSESSMENT - 10/07/22 1053          Discharge Planning    Assessment Type Discharge Planning Brief Assessment (P)      Resource/Environmental Concerns none (P)      Support Systems Spouse/significant other (P)      Assistance Needed none (P)      Equipment Currently Used at Home nebulizer (P)      Current Living Arrangements home/apartment/condo (P)      Care Facility Name home (P)      Patient/Family Anticipates Transition to home with family (P)      Patient/Family Anticipated Services at Transition none (P)      DME Needed Upon Discharge  none (P)      Discharge Plan A Home with family (P)      Discharge Plan B Other (P)    tbd

## 2022-10-07 NOTE — ASSESSMENT & PLAN NOTE
- Ms. Yanni Kaiesr presents with cough, and shortness of breath   Initiate standard COVID protocols; COVID-19 testing ,Infection Control notification  and isolation- respiratory, contact and droplet per protocol    Diagnostics: (leukopenia, hyponatremia, hyperferritinemia, elevated troponin, elevated d-dimer, age, and comorbidities are significant predictors of poor clinical outcome)  CBC, CMP, Procalcitonin, Ferritin, CRP, LDH, BNP, Troponin and Portable CXR    Management: Initiate targeted therapy with Remdesivir, 200mg IV x1, followed by 100mg IV daily x5 days total, Dexamethasone PO/IV 6mg daily x10 days and Anticoagulation, Patient admitted to non-critical care unit- Will initiate full dose anticoagulation with Weight based lovenox 1mg/kg IV q12, Maintain oxygen saturations 92-96% via Nasal Cannula  LPM and monitor with continuous/intermittent pulse oximetry. , Inhaled bronchodilators as needed for shortness of breath. and Continuous cardiac monitoring.

## 2022-10-07 NOTE — H&P
[facility] Medicine  History & Physical    Patient Name: Yanni Kaiser  MRN: [id number]  Patient Class: OP- Observation  Admission Date: 10/6/2022  Attending Physician: Koko Quezada MD   Primary Care Provider: Carlyle Gordillo MD         Patient information was obtained from patient, past medical records and ER records.     Subjective:     Principal Problem:Pneumonia due to COVID-19 virus    Chief Complaint:   Chief Complaint   Patient presents with    Shortness of Breath     Pt reports SOB, body aches, cough since last night. Reports no relief from inhaler, hx of asthma. Pt denies CP, V/D.         HPI: Ms. Yanni Kaiser is a 52 y.o. female, with PMH of HTN, HLD, CKD-III, anemia of chronic disease, GERD, asthma, COPD, T2DM, opioid dependence, chronic back pain, who presented to [facility] ED on 10/6/22 due to  shortness of breath. She notes associated body aches.  cough x 2 day, fever, wheezing. She states she used her albuterol inhaler without relief. She denies dysuria, chest pain, the was evaluated in the ED with labs showing stable H&H, no leukocytosis, + left shift. A metabolic panel showed no significant abnormalities. A d-dimer was elevated at 1.24. A Covid-19 test was positive. A flu A test was positive. A CTA showed no acute PE. She was treated with 3 DuoNebs. She was placed on OBS.       Past Medical History:   Diagnosis Date    Allergy     Anemia     Anxiety     Asthma     Back pain     Chronic bronchitis     Chronic obstructive pulmonary disease, unspecified COPD type 4/1/2022    Cigarette smoker     DDD (degenerative disc disease), lumbar 6/9/2020    Depression     Diabetes mellitus with peripheral circulatory disorder 4/1/2022    Diabetes with neurologic complications     DUB (dysfunctional uterine bleeding) 10/16/2018    GERD (gastroesophageal reflux disease)     High cholesterol     Hyperprolactinemia     Hypertension     Influenza A 01/16/2020     Neuromuscular disorder     Obese body habitus     Obesity     Pseudotumor cerebri     Renal manifestation of secondary diabetes mellitus     Respiratory failure     Seizures     Simple endometrial hyperplasia 2018    Sleep apnea     Smoker     Tobacco dependence     Urinary incontinence        Past Surgical History:   Procedure Laterality Date    ANTROSTOMY OF MAXILLARY SINUS AT INFERIOR NASAL MEATUS  10/22/2021    Procedure: MAXILLARY ANTROSTOMY, AT INFERIOR NASAL MEATUS;  Surgeon: John Prado III, MD;  Location: [facility] OR;  Service: ENT;;    BREAST CYST ASPIRATION       SECTION      X 1    CHOLECYSTECTOMY      COLONOSCOPY      COLONOSCOPY N/A 2019    Procedure: COLONOSCOPY;  Surgeon: Jagruti Sweeney MD;  Location: [facility] (2ND FLR);  Service: Endoscopy;  Laterality: N/A;  BMI is 63/gastroparesis/3 days full liquid diet 1 day clear liquid see telephone encounter by Ciara limon    EPIDURAL STEROID INJECTION N/A 2020    Procedure: INJECTION, STEROID, EPIDURAL, L5-S1 IL;  Surgeon: Lawrence Marin MD;  Location: [facility] PAIN MGT;  Service: Pain Management;  Laterality: N/A;    ESOPHAGEAL MANOMETRY WITH MEASUREMENT OF IMPEDANCE N/A 2019    Procedure: MANOMETRY-ESOPHAGEAL-WITH IMPEDANCE;  Surgeon: Jagruti Sweeney MD;  Location: [facility] ([address] [place] [address]);  Service: Endoscopy;  Laterality: N/A;  Hold Narcotics x 1 days   Hold TCA x 1 days  Propofol only. No fentanyl or benzodiazepine during sedation. If additional sedation needed, discuss with Dr. Sweeney.  10/29 - pt confirmed appt    ESOPHAGOGASTRODUODENOSCOPY N/A 2019    Procedure: EGD (ESOPHAGOGASTRODUODENOSCOPY);  Surgeon: Jagruti Sweeney MD;  Location: [facility] ([address] [place] [address]);  Service: Endoscopy;  Laterality: N/A;  BMI is 63/gastroparesis/3 days full liquid diet 1 day clear liquid see telephone encounter by Ciara limon    ESOPHAGOGASTRODUODENALETHEA N/A 2019    Procedure:  ESOPHAGOGASTRODUODENOSCOPY (EGD);  Surgeon: Jagruti Sweeney MD;  Location: [facility] ENDO (2ND FLR);  Service: Endoscopy;  Laterality: N/A;  EGD with EndoFlip /+/- Botox  2nd floor for gastroparesis/BMI 63 **367lbs**  Bariatric Stretcher needed  Manometry probe to be placed endoscopically during EGD procedure  Due to change in protocol, Full liquid diet for 3 days/ 1 day of clear liquids  Hi    ESOPHAGOGASTRODUODENOSCOPY N/A 12/14/2020    Procedure: ESOPHAGOGASTRODUODENOSCOPY (EGD) with BRAVO;  Surgeon: Jagruti Sweeney MD;  Location: [facility] ENDO (2ND FLR);  Service: Endoscopy;  Laterality: N/A;  with Dilation  2nd floor due to BMI 56.20 (327 lbs) and gastroparesis  3 days full liquid diet and 1 day clears    ESOPHAGOGASTRODUODENOSCOPY N/A 04/15/2021    Procedure: ESOPHAGOGASTRODUODENOSCOPY (EGD);  Surgeon: Jagruti Sweeney MD;  Location: [facility] ENDO (2ND FLR);  Service: Endoscopy;  Laterality: N/A;  Endoflip  2nd floor-gastroparesis, BMI 57.18, bariatric stretcher  full liquid diet x3 days, clear liquid diet x1 day prior to procedure  propofol only  covid test 4/12 primary care, instructions emailed-\[facility]\""    FOOT FRACTURE SURGERY Left     FUNCTIONAL ENDOSCOPIC SINUS SURGERY (FESS) USING COMPUTER-ASSISTED NAVIGATION Bilateral 10/22/2021    Procedure: FESS, USING COMPUTER-ASSISTED NAVIGATION;  Surgeon: John Prado III, MD;  Location: [facility] [place] OR;  Service: ENT;  Laterality: Bilateral;  FOLLOW DR PRADO ANESTHESIA PROTOCAL / pt will stay 23 hour post surgery for SHARATH observation    HYSTEROSCOPY WITH DILATION AND CURETTAGE OF UTERUS N/A 10/16/2018    KJB    INJECTION OF ANESTHETIC AGENT AROUND NERVE Bilateral 10/23/2020    Procedure: BLOCK, NERVE  bilateral L3,4,5 MBB;  Surgeon: Lawrence Marin MD;  Location: [facility] [place] PAIN MGT;  Service: Pain Management;  Laterality: Bilateral;  bilateral L3,4,5 MBB   consent needed    INJECTION OF ANESTHETIC AGENT AROUND NERVE Bilateral 02/18/2022    Procedure: BLOCK, NERVE, L3-L4-L5 MEDIAL  BRANCH;  Surgeon: Lawrence Marin MD;  Location: [facility] PAIN MGT;  Service: Pain Management;  Laterality: Bilateral;    PLANTAR FASCIOTOMY Left 11/18/2019    Procedure: FASCIOTOMY, PLANTAR;  Surgeon: Valentino Orourke DPM;  [address]  Service: Podiatry;  Laterality: Left;    RETINAL LASER PROCEDURE Left     SINUS SURGERY      SPHENOIDECTOMY Bilateral 10/22/2021    Procedure: SPHENOIDECTOMY;  Surgeon: John Prado III, MD;  Location: [facility] OR;  Service: ENT;  Laterality: Bilateral;    TRANSFORAMINAL EPIDURAL INJECTION OF STEROID Bilateral 06/24/2020    Procedure: INJECTION, STEROID, EPIDURAL, TRANSFORAMINAL APPROACH;  Surgeon: Lawrence Marin MD;  Location: [facility] PAIN MGT;  Service: Pain Management;  Laterality: Bilateral;    TRANSFORAMINAL EPIDURAL INJECTION OF STEROID Bilateral 01/28/2022    Procedure: INJECTION, STEROID, EPIDURAL, TRANSFORAMINAL APPROACH  Bilateral TFESI L4/L5      NEEDS CONSENT;  Surgeon: Lawrence Marin MD;  Location: [facility] PAIN MGT;  Service: Pain Management;  Laterality: Bilateral;    UPPER GASTROINTESTINAL ENDOSCOPY         Review of patient's allergies indicates:   Allergen Reactions    Gabapentin Other (See Comments)     Makes her twitch       Current Facility-Administered Medications on File Prior to Encounter   Medication    albuterol inhaler 2 puff    diphenhydrAMINE injection 25 mg    EPINEPHrine (EPIPEN) 0.3 mg/0.3 mL pen injection 0.3 mg    methylPREDNISolone sodium succinate injection 40 mg    ondansetron disintegrating tablet 4 mg    sodium chloride 0.9% 500 mL flush bag    sodium chloride 0.9% flush 10 mL     Current Outpatient Medications on File Prior to Encounter   Medication Sig    (Magic mouthwash) 1:1 Benadryl 12.5mg/5ml liq, mylanta, LIDOcaine viscous 2%, nystatin 100,000u, prednisolone 15mg/5mL, distilled water Swish and swallow 10 mLs every 4 (four) hours as needed (mouth and throat pain).    albuterol (PROVENTIL) 2.5 mg /3 mL (0.083 %)  nebulizer solution Take 3 mLs (2.5 mg total) by nebulization every 6 (six) hours as needed for Wheezing. Rescue    albuterol (PROVENTIL/VENTOLIN HFA) 90 mcg/actuation inhaler Inhale 2 puffs into the lungs every 6 (six) hours as needed for Wheezing or Shortness of Breath. Rescue    albuterol-ipratropium (DUO-NEB) 2.5 mg-0.5 mg/3 mL nebulizer solution Take 3 mLs by nebulization every 6 (six) hours as needed for Wheezing. Rescue    alcohol swabs (BD ALCOHOL SWABS) PadM Apply 1 each topically 2 (two) times a day.    amitriptyline (ELAVIL) 10 MG tablet TAKE 1 TABLET(10 MG) BY MOUTH EVERY NIGHT AS NEEDED FOR INSOMNIA    ammonium lactate 12 % Crea Apply twice daily to affected parts both feet as needed.    azelastine (ASTELIN) 137 mcg (0.1 %) nasal spray 2 sprays (274 mcg total) by Nasal route 2 (two) times daily.    azithromycin (Z-RHODA) 250 MG tablet Take 2 tablets by mouth on day 1; Take 1 tablet by mouth on days 2-5    blood sugar diagnostic Strp 1 each by Misc.(Non-Drug; Combo Route) route 4 (four) times daily before meals and nightly. ACCU CHEK ELVIA PLUS METER    blood-glucose meter (ACCU-CHEK ELVIA PLUS METER) Misc TEST FOUR TIMES DAILY BEFORE MEALS AND EVERY NIGHT    cetirizine (ZYRTEC) 10 MG tablet Take 1 tablet (10 mg total) by mouth daily as needed for Allergies (itching).    clobetasoL (TEMOVATE) 0.05 % external solution Apply topically 2 (two) times daily. Prn scalp itch or rash    cyclobenzaprine (FLEXERIL) 10 MG tablet     diclofenac sodium (VOLTAREN) 1 % Gel Apply 2 g topically 3 (three) times daily. Apply to painful joints    empagliflozin (JARDIANCE) 10 mg tablet Take 1 tablet (10 mg total) by mouth once daily.    EPINEPHrine (EPIPEN) 0.3 mg/0.3 mL AtIn Inject 0.3 mLs (0.3 mg total) into the muscle once. for 1 dose    fluticasone furoate-vilanteroL (BREO ELLIPTA) 200-25 mcg/dose DsDv diskus inhaler Inhale 1 puff into the lungs once daily. Controller    fluticasone propionate (FLONASE) 50  mcg/actuation nasal spray 2 sprays (100 mcg total) by Each Nostril route 2 (two) times a day.    lancets (ACCU-CHEK FASTCLIX LANCET DRUM) Misc 1 Device by Misc.(Non-Drug; Combo Route) route 2 (two) times a day.    lancets (ACCU-CHEK SOFTCLIX LANCETS) Misc 1 Device by Misc.(Non-Drug; Combo Route) route 4 (four) times daily.    levocetirizine (XYZAL) 5 MG tablet Take 1 tablet (5 mg total) by mouth every evening.    lidocaine (LIDODERM) 5 % Place 1 patch onto the skin once daily. Remove & Discard patch within 12 hours or as directed by MD    LIDOcaine HCL 2% (XYLOCAINE) 2 % jelly Apply topically as needed. Apply topically once nightly to affected part of foot/feet.    LINZESS 290 mcg Cap capsule Take 290 mcg by mouth once daily.    losartan (COZAAR) 100 MG tablet Take 1 tablet (100 mg total) by mouth once daily.    methylPREDNISolone (MEDROL DOSEPACK) 4 mg tablet use as directed    montelukast (SINGULAIR) 10 mg tablet Take 1 tablet (10 mg total) by mouth every evening.    MOVANTIK 12.5 mg Tab Take 1 tablet by mouth once daily.    nicotine (NICODERM CQ) 21 mg/24 hr Place 1 patch onto the skin once daily.    nystatin-triamcinolone (MYCOLOG) ointment Apply topically 2 (two) times daily.    ondansetron (ZOFRAN) 8 MG tablet Take 1 tablet (8 mg total) by mouth every 8 (eight) hours as needed for Nausea.    oxyCODONE-acetaminophen (PERCOCET) 7.5-325 mg per tablet Take 1 tablet by mouth 3 (three) times daily as needed for Pain.    pantoprazole (PROTONIX) 40 MG tablet TAKE 1 TABLET(40 MG) BY MOUTH TWICE DAILY    pregabalin (LYRICA) 50 MG capsule Take 1 capsule (50 mg total) by mouth 3 (three) times daily.    QUEtiapine (SEROQUEL) 25 MG Tab Take 1 tablet (25 mg total) by mouth once daily.    semaglutide 2 mg/dose (8 mg/3 mL) PnIj Inject 2 mg into the skin every 7 days.    sulindac (CLINORIL) 200 MG Tab Take 1 tablet (200 mg total) by mouth 2 (two) times daily.    topiramate (TOPAMAX) 50 MG tablet Take 2  "tablets (100 mg total) by mouth every evening.    triamcinolone acetonide 0.1% (KENALOG) 0.1 % cream Apply topically 2 (two) times daily. Prn focally for rash spots on forehead and legs.Stop using steroid topical when skin is smooth and non itchy.  Do not treat dark or red coloring.    venlafaxine (EFFEXOR-XR) 75 MG 24 hr capsule Take 1 capsule (75 mg total) by mouth once daily. NO FURTHER REFILL WITHOUT APT     Family History       Problem Relation (Age of Onset)    Breast cancer Maternal Grandmother    COPD Father    Cancer Maternal Grandmother    Cataracts Mother, Father    Colon cancer Mother (50), Maternal Grandmother    Colon polyps Mother, Maternal Grandmother    Diabetes Mother, Daughter, Daughter    Glaucoma Father    Heart attack Father    Heart disease Father    Hypertension Mother, Father, Daughter    No Known Problems Brother, Daughter, Son, Maternal Aunt, Maternal Uncle, Paternal Aunt, Paternal Uncle, Maternal Grandfather, Paternal Grandmother, Paternal Grandfather    Obesity Daughter    Ulcers Father          Tobacco Use    Smoking status: Every Day     Packs/day: 1.00     Years: 27.00     Pack years: 27.00     Types: Cigarettes     Start date: 1/1/1992     Passive exposure: Current    Smokeless tobacco: Never    Tobacco comments:     "1/2 pack per every 2 days or so"   Substance and Sexual Activity    Alcohol use: Yes     Alcohol/week: 1.0 standard drink     Types: 1 Shots of liquor per week     Comment: occasionally    Drug use: No    Sexual activity: Yes     Partners: Male     Birth control/protection: None     Comment:      Review of Systems   Constitutional:  Positive for activity change, appetite change, chills, diaphoresis and fever. Negative for fatigue.   HENT:  Negative for congestion, ear pain, postnasal drip, rhinorrhea, sinus pressure, sore throat and trouble swallowing.    Respiratory:  Positive for cough, shortness of breath and wheezing.    Cardiovascular:  Negative for " chest pain and palpitations.   Gastrointestinal:  Negative for abdominal distention, abdominal pain, constipation, diarrhea, nausea and vomiting.   Genitourinary:  Negative for decreased urine volume, dysuria, flank pain, frequency, hematuria and urgency.   Musculoskeletal:  Positive for myalgias. Negative for arthralgias, back pain, gait problem, joint swelling, neck pain and neck stiffness.   Skin:  Negative for color change, pallor, rash and wound.   Neurological:  Positive for headaches. Negative for dizziness, syncope, weakness, light-headedness and numbness.   Psychiatric/Behavioral:  Negative for agitation and confusion.    Objective:     Vital Signs (Most Recent):  Temp: 99.9 °F (37.7 °C) (10/06/22 2142)  Pulse: 95 (10/07/22 0602)  Resp: 17 (10/07/22 0602)  BP: (!) 173/85 (10/07/22 0602)  SpO2: 95 % (10/07/22 0602)   Vital Signs (24h Range):  Temp:  [99.9 °F (37.7 °C)-100.8 °F (38.2 °C)] 99.9 °F (37.7 °C)  Pulse:  [] 95  Resp:  [16-29] 17  SpO2:  [93 %-100 %] 95 %  BP: (133-199)/(75-98) 173/85     Weight: (!) 162.4 kg (358 lb)  Body mass index is 61.45 kg/m².    Physical Exam  Vitals and nursing note reviewed.   Constitutional:       General: She is not in acute distress.     Appearance: She is well-developed. She is obese. She is not ill-appearing, toxic-appearing or diaphoretic.      Comments: Appears as if she feels generally unwell, but is in no distress.    HENT:      Head: Normocephalic and atraumatic.   Eyes:      General: No scleral icterus.        Right eye: No discharge.         Left eye: No discharge.      Conjunctiva/sclera: Conjunctivae normal.   Neck:      Trachea: No tracheal deviation.   Cardiovascular:      Rate and Rhythm: Normal rate and regular rhythm.      Heart sounds: Normal heart sounds. No murmur heard.    No gallop.   Pulmonary:      Effort: Pulmonary effort is normal. No respiratory distress.      Breath sounds: Normal breath sounds. No stridor. No wheezing or rales.    Abdominal:      General: Bowel sounds are normal. There is no distension.      Palpations: Abdomen is soft. There is no mass.      Tenderness: There is no abdominal tenderness. There is no guarding.   Musculoskeletal:         General: No deformity. Normal range of motion.      Cervical back: Normal range of motion and neck supple.   Skin:     General: Skin is warm and dry.      Coloration: Skin is not pale.      Findings: No erythema or rash.   Neurological:      General: No focal deficit present.      Mental Status: She is alert and oriented to person, place, and time.      Cranial Nerves: No cranial nerve deficit.      Motor: No abnormal muscle tone.   Psychiatric:         Mood and Affect: Mood normal.         Behavior: Behavior normal.         Thought Content: Thought content normal.         Judgment: Judgment normal.           Significant Labs: All pertinent labs within the past 24 hours have been reviewed.  BMP:   Recent Labs   Lab 10/07/22  0507   *      K 4.0      CO2 21*   BUN 13   CREATININE 1.2   CALCIUM 9.2   MG 2.1     CBC:   Recent Labs   Lab 10/06/22  1856 10/07/22  0507   WBC 5.24 4.44  4.44   HGB 12.9 13.7  13.7   HCT 38.2 40.5  40.5    252  252     CMP:   Recent Labs   Lab 10/06/22  1856 10/07/22  0507    136   K 3.8 4.0    105   CO2 26 21*   * 146*   BUN 10 13   CREATININE 1.3 1.2   CALCIUM 8.7 9.2   PROT 7.1  --    ALBUMIN 3.4*  --    BILITOT 0.4  --    ALKPHOS 73  --    AST 24  --    ALT 29  --    ANIONGAP 7* 10       Significant Imaging: I have reviewed all pertinent imaging results/findings within the past 24 hours.  Imaging Results              CTA Chest Non-Coronary (PE Studies) (Final result)  Result time 10/06/22 23:32:29      Final result by Tiago Mendenhall MD (10/06/22 23:32:29)                   Impression:      1. No evidence of PE.  2. Minimal patchy ground-glass opacities more pronounced in the lower lobes and may be secondary to  respiratory motion, however noting that mild infectious or inflammatory process could present with similar appearance.  No focal consolidation.      Electronically signed by: Tiago Mendenhall MD  Date:    10/06/2022  Time:    23:32               Narrative:    EXAMINATION:  CTA CHEST NON CORONARY (PE STUDIES)    CLINICAL HISTORY:  Pulmonary embolism (PE) suspected, positive D-dimer;    TECHNIQUE:  Low dose axial images, sagittal and coronal reformations were obtained from the thoracic inlet to the lung bases following the IV administration of 75 mL of Omnipaque 350.  Contrast timing was optimized to evaluate the pulmonary arteries.  MIP images were performed.    COMPARISON:  CTA chest from June 2018.    FINDINGS:  Structures at the base of the neck are unremarkable.  Aorta is non-aneurysmal.  The heart is normal in size without pericardial effusion.  No intraluminal filling defects within the pulmonary arteries to suggest pulmonary thromboembolism to the level of the proximal segmental branches.  There is no evidence of mediastinal, axillary, or hilar lymph node enlargement.  The esophagus is unremarkable along its course.    The trachea and bronchi are patent.  The lungs are symmetrically expanded.  Minimal patchy ground-glass opacities are seen more pronounced in the lower lobes and may be secondary to respiratory motion.  No focal consolidation.  No pleural effusion.  No evidence to suggest pulmonary hemorrhage or infarction.    The visualized abdominal structures show no acute abnormalities.  No acute osseous abnormality identified.  Extrathoracic soft tissues are unremarkable.                                       X-Ray Chest AP Portable (Final result)  Result time 10/06/22 18:56:16      Final result by Tiago Mendenhall MD (10/06/22 18:56:16)                   Impression:      No acute cardiopulmonary process identified.      Electronically signed by: Tiago Mendenhall MD  Date:    10/06/2022  Time:    18:56                Narrative:    EXAMINATION:  XR CHEST AP PORTABLE    CLINICAL HISTORY:  Chest Pain;    TECHNIQUE:  Single frontal view of the chest was performed.    COMPARISON:  09/08/2022.    FINDINGS:  Cardiac silhouette is stable in size.  Lungs are symmetrically expanded.  No evidence of new focal consolidative process, pneumothorax, or significant pleural effusion.  No acute osseous abnormality identified.                                       Assessment/Plan:     * Pneumonia due to COVID-19 virus  - Ms. Yanni Kaiser presents with cough, and shortness of breath   Initiate standard COVID protocols; COVID-19 testing ,Infection Control notification  and isolation- respiratory, contact and droplet per protocol    Diagnostics: (leukopenia, hyponatremia, hyperferritinemia, elevated troponin, elevated d-dimer, age, and comorbidities are significant predictors of poor clinical outcome)  CBC, CMP, Procalcitonin, Ferritin, CRP, LDH, BNP, Troponin and Portable CXR    Management: Initiate targeted therapy with Remdesivir, 200mg IV x1, followed by 100mg IV daily x5 days total, Dexamethasone PO/IV 6mg daily x10 days and Anticoagulation, Patient admitted to non-critical care unit- Will initiate full dose anticoagulation with Weight based lovenox 1mg/kg IV q12, Maintain oxygen saturations 92-96% via Nasal Cannula  LPM and monitor with continuous/intermittent pulse oximetry. , Inhaled bronchodilators as needed for shortness of breath. and Continuous cardiac monitoring.    COVID-19 virus infection  - treatment as above in Pneumonia due to Covid-19 virus       Moderate persistent asthma without complication  - no apparent exacerbation at present   - PRN albuterol ordered       Influenza A  - tamiflu ordered   - supportive care   - isolation precautions       Epilepsy  - seizure precautions       Essential hypertension  - hypertensive at present   - home meds: losartan 100 mg QD  - monitor     Anemia of chronic disease  - H&H  somewhat hemo concentrated compared to usual   - monitor     SHARATH (obstructive sleep apnea)  -  CPAP ordered       VTE Risk Mitigation (From admission, onward)         Ordered     enoxaparin injection 160 mg  2 times daily         10/07/22 0429     IP VTE HIGH RISK PATIENT  Once         10/07/22 0013     Place sequential compression device  Until discontinued         10/07/22 0013                   Migdalia Marina PA-C  Department of Hospital Medicine   [facility] [place] - Emergency Dept

## 2022-10-07 NOTE — SUBJECTIVE & OBJECTIVE
Past Medical History:   Diagnosis Date    Allergy     Anemia     Anxiety     Asthma     Back pain     Chronic bronchitis     Chronic obstructive pulmonary disease, unspecified COPD type 2022    Cigarette smoker     DDD (degenerative disc disease), lumbar 2020    Depression     Diabetes mellitus with peripheral circulatory disorder 2022    Diabetes with neurologic complications     DUB (dysfunctional uterine bleeding) 10/16/2018    GERD (gastroesophageal reflux disease)     High cholesterol     Hyperprolactinemia     Hypertension     Influenza A 2020    Neuromuscular disorder     Obese body habitus     Obesity     Pseudotumor cerebri     Renal manifestation of secondary diabetes mellitus     Respiratory failure     Seizures     Simple endometrial hyperplasia     Sleep apnea     Smoker     Tobacco dependence     Urinary incontinence        Past Surgical History:   Procedure Laterality Date    ANTROSTOMY OF MAXILLARY SINUS AT INFERIOR NASAL MEATUS  10/22/2021    Procedure: MAXILLARY ANTROSTOMY, AT INFERIOR NASAL MEATUS;  Surgeon: John Prado III, MD;  Location: [facility] OR;  Service: ENT;;    BREAST CYST ASPIRATION       SECTION      X 1    CHOLECYSTECTOMY      COLONOSCOPY      COLONOSCOPY N/A 2019    Procedure: COLONOSCOPY;  Surgeon: Jagruti Sweeney MD;  Location: [facility] ([address] [place] [address]);  Service: Endoscopy;  Laterality: N/A;  BMI is 63/gastroparesis/3 days full liquid diet 1 day clear liquid see telephone encounter by Ciara Brown     EPIDURAL STEROID INJECTION N/A 2020    Procedure: INJECTION, STEROID, EPIDURAL, L5-S1 IL;  Surgeon: Lawrence Marin MD;  Location: [facility] PAIN MGT;  Service: Pain Management;  Laterality: N/A;    ESOPHAGEAL MANOMETRY WITH MEASUREMENT OF IMPEDANCE N/A 2019    Procedure: MANOMETRY-ESOPHAGEAL-WITH IMPEDANCE;  Surgeon: Jagruti Sweeney MD;  [facility]  Service: Endoscopy;  Laterality: N/A;  Hold Narcotics x 1 days    Hold TCA x 1 days  Propofol only. No fentanyl or benzodiazepine during sedation. If additional sedation needed, discuss with Dr. Sweeney.  10/29 - pt confirmed appt    ESOPHAGOGASTRODUODENOSCOPY N/A 01/14/2019    Procedure: EGD (ESOPHAGOGASTRODUODENOSCOPY);  Surgeon: Jagruti Sweeney MD;  Location: [facility] (2ND FLR);  Service: Endoscopy;  Laterality: N/A;  BMI is 63/gastroparesis/3 days full liquid diet 1 day clear liquid see telephone encounter by Ciara Brown     ESOPHAGOGASTRODUODENOSCOPY N/A 11/05/2019    Procedure: ESOPHAGOGASTRODUODENOSCOPY (EGD);  Surgeon: Jagruti Sweeney MD;  Location: [facility] (2ND FLR);  Service: Endoscopy;  Laterality: N/A;  EGD with EndoFlip /+/- Botox  2nd floor for gastroparesis/BMI 63 **367lbs**  Bariatric Stretcher needed  Manometry probe to be placed endoscopically during EGD procedure  Due to change in protocol, Full liquid diet for 3 days/ 1 day of clear liquids  Hi    ESOPHAGOGASTRODUODENOSCOPY N/A 12/14/2020    Procedure: ESOPHAGOGASTRODUODENOSCOPY (EGD) with BRAVO;  Surgeon: Jagruti Sweeney MD;  Location: [facility] (2ND FLR);  Service: Endoscopy;  Laterality: N/A;  with Dilation  2nd floor due to BMI 56.20 (327 lbs) and gastroparesis  3 days full liquid diet and 1 day clears    ESOPHAGOGASTRODUODENOSCOPY N/A 04/15/2021    Procedure: ESOPHAGOGASTRODUODENOSCOPY (EGD);  Surgeon: Jagruti Sweeney MD;  Location: [facility] ENDO (2ND FLR);  Service: Endoscopy;  Laterality: N/A;  Endoflip  2nd floor-gastroparesis, BMI 57.18, bariatric stretcher  full liquid diet x3 days, clear liquid diet x1 day prior to procedure  propofol only  covid test 4/12 primary care, instructions emailed-[facility]    FOOT FRACTURE SURGERY Left     FUNCTIONAL ENDOSCOPIC SINUS SURGERY (FESS) USING COMPUTER-ASSISTED NAVIGATION Bilateral 10/22/2021    Procedure: FESS, USING COMPUTER-ASSISTED NAVIGATION;  Surgeon: John Prado III, MD;  Location: [facility] OR;  Service: ENT;  Laterality: Bilateral;  FOLLOW   JASON MCNULTY PROTOCAL / pt will stay 23 hour post surgery for SHARATH observation    HYSTEROSCOPY WITH DILATION AND CURETTAGE OF UTERUS N/A 10/16/2018    KJB    INJECTION OF ANESTHETIC AGENT AROUND NERVE Bilateral 10/23/2020    Procedure: BLOCK, NERVE  bilateral L3,4,5 MBB;  Surgeon: Lawrence Marin MD;  Location: [facility] PAIN MGT;  Service: Pain Management;  Laterality: Bilateral;  bilateral L3,4,5 MBB   consent needed    INJECTION OF ANESTHETIC AGENT AROUND NERVE Bilateral 02/18/2022    Procedure: BLOCK, NERVE, L3-L4-L5 MEDIAL BRANCH;  Surgeon: Lawrence Marin MD;  Location: [facility] PAIN MGT;  Service: Pain Management;  Laterality: Bilateral;    PLANTAR FASCIOTOMY Left 11/18/2019    Procedure: FASCIOTOMY, PLANTAR;  Surgeon: Valentino Orourke DPM;  Location: [address];  Service: Podiatry;  Laterality: Left;    RETINAL LASER PROCEDURE Left     SINUS SURGERY      SPHENOIDECTOMY Bilateral 10/22/2021    Procedure: SPHENOIDECTOMY;  Surgeon: John Prado III, MD;  Location: [facility] OR;  Service: ENT;  Laterality: Bilateral;    TRANSFORAMINAL EPIDURAL INJECTION OF STEROID Bilateral 06/24/2020    Procedure: INJECTION, STEROID, EPIDURAL, TRANSFORAMINAL APPROACH;  Surgeon: Lawrence Marin MD;  Location: [facility] PAIN MGT;  Service: Pain Management;  Laterality: Bilateral;    TRANSFORAMINAL EPIDURAL INJECTION OF STEROID Bilateral 01/28/2022    Procedure: INJECTION, STEROID, EPIDURAL, TRANSFORAMINAL APPROACH  Bilateral TFESI L4/L5      NEEDS CONSENT;  Surgeon: Lawrence Marin MD;  Location: [facility] PAIN MGT;  Service: Pain Management;  Laterality: Bilateral;    UPPER GASTROINTESTINAL ENDOSCOPY         Review of patient's allergies indicates:   Allergen Reactions    Gabapentin Other (See Comments)     Makes her twitch       Current Facility-Administered Medications on File Prior to Encounter   Medication    albuterol inhaler 2 puff    diphenhydrAMINE injection 25 mg    EPINEPHrine (EPIPEN) 0.3 mg/0.3 mL pen injection 0.3 mg     methylPREDNISolone sodium succinate injection 40 mg    ondansetron disintegrating tablet 4 mg    sodium chloride 0.9% 500 mL flush bag    sodium chloride 0.9% flush 10 mL     Current Outpatient Medications on File Prior to Encounter   Medication Sig    (Magic mouthwash) 1:1 Benadryl 12.5mg/5ml liq, mylanta, LIDOcaine viscous 2%, nystatin 100,000u, prednisolone 15mg/5mL, distilled water Swish and swallow 10 mLs every 4 (four) hours as needed (mouth and throat pain).    albuterol (PROVENTIL) 2.5 mg /3 mL (0.083 %) nebulizer solution Take 3 mLs (2.5 mg total) by nebulization every 6 (six) hours as needed for Wheezing. Rescue    albuterol (PROVENTIL/VENTOLIN HFA) 90 mcg/actuation inhaler Inhale 2 puffs into the lungs every 6 (six) hours as needed for Wheezing or Shortness of Breath. Rescue    albuterol-ipratropium (DUO-NEB) 2.5 mg-0.5 mg/3 mL nebulizer solution Take 3 mLs by nebulization every 6 (six) hours as needed for Wheezing. Rescue    alcohol swabs (BD ALCOHOL SWABS) PadM Apply 1 each topically 2 (two) times a day.    amitriptyline (ELAVIL) 10 MG tablet TAKE 1 TABLET(10 MG) BY MOUTH EVERY NIGHT AS NEEDED FOR INSOMNIA    ammonium lactate 12 % Crea Apply twice daily to affected parts both feet as needed.    azelastine (ASTELIN) 137 mcg (0.1 %) nasal spray 2 sprays (274 mcg total) by Nasal route 2 (two) times daily.    azithromycin (Z-RHODA) 250 MG tablet Take 2 tablets by mouth on day 1; Take 1 tablet by mouth on days 2-5    blood sugar diagnostic Strp 1 each by Misc.(Non-Drug; Combo Route) route 4 (four) times daily before meals and nightly. ACCU CHEK ELVIA PLUS METER    blood-glucose meter (ACCU-CHEK ELVIA PLUS METER) Misc TEST FOUR TIMES DAILY BEFORE MEALS AND EVERY NIGHT    cetirizine (ZYRTEC) 10 MG tablet Take 1 tablet (10 mg total) by mouth daily as needed for Allergies (itching).    clobetasoL (TEMOVATE) 0.05 % external solution Apply topically 2 (two) times daily. Prn scalp itch or rash    cyclobenzaprine  (FLEXERIL) 10 MG tablet     diclofenac sodium (VOLTAREN) 1 % Gel Apply 2 g topically 3 (three) times daily. Apply to painful joints    empagliflozin (JARDIANCE) 10 mg tablet Take 1 tablet (10 mg total) by mouth once daily.    EPINEPHrine (EPIPEN) 0.3 mg/0.3 mL AtIn Inject 0.3 mLs (0.3 mg total) into the muscle once. for 1 dose    fluticasone furoate-vilanteroL (BREO ELLIPTA) 200-25 mcg/dose DsDv diskus inhaler Inhale 1 puff into the lungs once daily. Controller    fluticasone propionate (FLONASE) 50 mcg/actuation nasal spray 2 sprays (100 mcg total) by Each Nostril route 2 (two) times a day.    lancets (ACCU-CHEK FASTCLIX LANCET DRUM) Misc 1 Device by Misc.(Non-Drug; Combo Route) route 2 (two) times a day.    lancets (ACCU-CHEK SOFTCLIX LANCETS) Misc 1 Device by Misc.(Non-Drug; Combo Route) route 4 (four) times daily.    levocetirizine (XYZAL) 5 MG tablet Take 1 tablet (5 mg total) by mouth every evening.    lidocaine (LIDODERM) 5 % Place 1 patch onto the skin once daily. Remove & Discard patch within 12 hours or as directed by MD    LIDOcaine HCL 2% (XYLOCAINE) 2 % jelly Apply topically as needed. Apply topically once nightly to affected part of foot/feet.    LINZESS 290 mcg Cap capsule Take 290 mcg by mouth once daily.    losartan (COZAAR) 100 MG tablet Take 1 tablet (100 mg total) by mouth once daily.    methylPREDNISolone (MEDROL DOSEPACK) 4 mg tablet use as directed    montelukast (SINGULAIR) 10 mg tablet Take 1 tablet (10 mg total) by mouth every evening.    MOVANTIK 12.5 mg Tab Take 1 tablet by mouth once daily.    nicotine (NICODERM CQ) 21 mg/24 hr Place 1 patch onto the skin once daily.    nystatin-triamcinolone (MYCOLOG) ointment Apply topically 2 (two) times daily.    ondansetron (ZOFRAN) 8 MG tablet Take 1 tablet (8 mg total) by mouth every 8 (eight) hours as needed for Nausea.    oxyCODONE-acetaminophen (PERCOCET) 7.5-325 mg per tablet Take 1 tablet by mouth 3 (three) times daily as needed for Pain.     "pantoprazole (PROTONIX) 40 MG tablet TAKE 1 TABLET(40 MG) BY MOUTH TWICE DAILY    pregabalin (LYRICA) 50 MG capsule Take 1 capsule (50 mg total) by mouth 3 (three) times daily.    QUEtiapine (SEROQUEL) 25 MG Tab Take 1 tablet (25 mg total) by mouth once daily.    semaglutide 2 mg/dose (8 mg/3 mL) PnIj Inject 2 mg into the skin every 7 days.    sulindac (CLINORIL) 200 MG Tab Take 1 tablet (200 mg total) by mouth 2 (two) times daily.    topiramate (TOPAMAX) 50 MG tablet Take 2 tablets (100 mg total) by mouth every evening.    triamcinolone acetonide 0.1% (KENALOG) 0.1 % cream Apply topically 2 (two) times daily. Prn focally for rash spots on forehead and legs.Stop using steroid topical when skin is smooth and non itchy.  Do not treat dark or red coloring.    venlafaxine (EFFEXOR-XR) 75 MG 24 hr capsule Take 1 capsule (75 mg total) by mouth once daily. NO FURTHER REFILL WITHOUT APT     Family History       Problem Relation (Age of Onset)    Breast cancer Maternal Grandmother    COPD Father    Cancer Maternal Grandmother    Cataracts Mother, Father    Colon cancer Mother (50), Maternal Grandmother    Colon polyps Mother, Maternal Grandmother    Diabetes Mother, Daughter, Daughter    Glaucoma Father    Heart attack Father    Heart disease Father    Hypertension Mother, Father, Daughter    No Known Problems Brother, Daughter, Son, Maternal Aunt, Maternal Uncle, Paternal Aunt, Paternal Uncle, Maternal Grandfather, Paternal Grandmother, Paternal Grandfather    Obesity Daughter    Ulcers Father          Tobacco Use    Smoking status: Every Day     Packs/day: 1.00     Years: 27.00     Pack years: 27.00     Types: Cigarettes     Start date: 1/1/1992     Passive exposure: Current    Smokeless tobacco: Never    Tobacco comments:     "1/2 pack per every 2 days or so"   Substance and Sexual Activity    Alcohol use: Yes     Alcohol/week: 1.0 standard drink     Types: 1 Shots of liquor per week     Comment: occasionally    Drug use: " No    Sexual activity: Yes     Partners: Male     Birth control/protection: None     Comment:      Review of Systems   Constitutional:  Positive for activity change, appetite change, chills, diaphoresis and fever. Negative for fatigue.   HENT:  Negative for congestion, ear pain, postnasal drip, rhinorrhea, sinus pressure, sore throat and trouble swallowing.    Respiratory:  Positive for cough, shortness of breath and wheezing.    Cardiovascular:  Negative for chest pain and palpitations.   Gastrointestinal:  Negative for abdominal distention, abdominal pain, constipation, diarrhea, nausea and vomiting.   Genitourinary:  Negative for decreased urine volume, dysuria, flank pain, frequency, hematuria and urgency.   Musculoskeletal:  Positive for myalgias. Negative for arthralgias, back pain, gait problem, joint swelling, neck pain and neck stiffness.   Skin:  Negative for color change, pallor, rash and wound.   Neurological:  Positive for headaches. Negative for dizziness, syncope, weakness, light-headedness and numbness.   Psychiatric/Behavioral:  Negative for agitation and confusion.    Objective:     Vital Signs (Most Recent):  Temp: 99.9 °F (37.7 °C) (10/06/22 2142)  Pulse: 95 (10/07/22 0602)  Resp: 17 (10/07/22 0602)  BP: (!) 173/85 (10/07/22 0602)  SpO2: 95 % (10/07/22 0602)   Vital Signs (24h Range):  Temp:  [99.9 °F (37.7 °C)-100.8 °F (38.2 °C)] 99.9 °F (37.7 °C)  Pulse:  [] 95  Resp:  [16-29] 17  SpO2:  [93 %-100 %] 95 %  BP: (133-199)/(75-98) 173/85     Weight: (!) 162.4 kg (358 lb)  Body mass index is 61.45 kg/m².    Physical Exam  Vitals and nursing note reviewed.   Constitutional:       General: She is not in acute distress.     Appearance: She is well-developed. She is obese. She is not ill-appearing, toxic-appearing or diaphoretic.      Comments: Appears as if she feels generally unwell, but is in no distress.    HENT:      Head: Normocephalic and atraumatic.   Eyes:      General: No scleral  icterus.        Right eye: No discharge.         Left eye: No discharge.      Conjunctiva/sclera: Conjunctivae normal.   Neck:      Trachea: No tracheal deviation.   Cardiovascular:      Rate and Rhythm: Normal rate and regular rhythm.      Heart sounds: Normal heart sounds. No murmur heard.    No gallop.   Pulmonary:      Effort: Pulmonary effort is normal. No respiratory distress.      Breath sounds: Normal breath sounds. No stridor. No wheezing or rales.   Abdominal:      General: Bowel sounds are normal. There is no distension.      Palpations: Abdomen is soft. There is no mass.      Tenderness: There is no abdominal tenderness. There is no guarding.   Musculoskeletal:         General: No deformity. Normal range of motion.      Cervical back: Normal range of motion and neck supple.   Skin:     General: Skin is warm and dry.      Coloration: Skin is not pale.      Findings: No erythema or rash.   Neurological:      General: No focal deficit present.      Mental Status: She is alert and oriented to person, place, and time.      Cranial Nerves: No cranial nerve deficit.      Motor: No abnormal muscle tone.   Psychiatric:         Mood and Affect: Mood normal.         Behavior: Behavior normal.         Thought Content: Thought content normal.         Judgment: Judgment normal.           Significant Labs: All pertinent labs within the past 24 hours have been reviewed.  BMP:   Recent Labs   Lab 10/07/22  0507   *      K 4.0      CO2 21*   BUN 13   CREATININE 1.2   CALCIUM 9.2   MG 2.1     CBC:   Recent Labs   Lab 10/06/22  1856 10/07/22  0507   WBC 5.24 4.44  4.44   HGB 12.9 13.7  13.7   HCT 38.2 40.5  40.5    252  252     CMP:   Recent Labs   Lab 10/06/22  1856 10/07/22  0507    136   K 3.8 4.0    105   CO2 26 21*   * 146*   BUN 10 13   CREATININE 1.3 1.2   CALCIUM 8.7 9.2   PROT 7.1  --    ALBUMIN 3.4*  --    BILITOT 0.4  --    ALKPHOS 73  --    AST 24  --    ALT 29   --    ANIONGAP 7* 10       Significant Imaging: I have reviewed all pertinent imaging results/findings within the past 24 hours.  Imaging Results              CTA Chest Non-Coronary (PE Studies) (Final result)  Result time 10/06/22 23:32:29      Final result by Tiago Mendenhall MD (10/06/22 23:32:29)                   Impression:      1. No evidence of PE.  2. Minimal patchy ground-glass opacities more pronounced in the lower lobes and may be secondary to respiratory motion, however noting that mild infectious or inflammatory process could present with similar appearance.  No focal consolidation.      Electronically signed by: Tiago Mendenhall MD  Date:    10/06/2022  Time:    23:32               Narrative:    EXAMINATION:  CTA CHEST NON CORONARY (PE STUDIES)    CLINICAL HISTORY:  Pulmonary embolism (PE) suspected, positive D-dimer;    TECHNIQUE:  Low dose axial images, sagittal and coronal reformations were obtained from the thoracic inlet to the lung bases following the IV administration of 75 mL of Omnipaque 350.  Contrast timing was optimized to evaluate the pulmonary arteries.  MIP images were performed.    COMPARISON:  CTA chest from June 2018.    FINDINGS:  Structures at the base of the neck are unremarkable.  Aorta is non-aneurysmal.  The heart is normal in size without pericardial effusion.  No intraluminal filling defects within the pulmonary arteries to suggest pulmonary thromboembolism to the level of the proximal segmental branches.  There is no evidence of mediastinal, axillary, or hilar lymph node enlargement.  The esophagus is unremarkable along its course.    The trachea and bronchi are patent.  The lungs are symmetrically expanded.  Minimal patchy ground-glass opacities are seen more pronounced in the lower lobes and may be secondary to respiratory motion.  No focal consolidation.  No pleural effusion.  No evidence to suggest pulmonary hemorrhage or infarction.    The visualized abdominal structures  show no acute abnormalities.  No acute osseous abnormality identified.  Extrathoracic soft tissues are unremarkable.                                       X-Ray Chest AP Portable (Final result)  Result time 10/06/22 18:56:16      Final result by Tiago Mendenhall MD (10/06/22 18:56:16)                   Impression:      No acute cardiopulmonary process identified.      Electronically signed by: Tiago Mendenhall MD  Date:    10/06/2022  Time:    18:56               Narrative:    EXAMINATION:  XR CHEST AP PORTABLE    CLINICAL HISTORY:  Chest Pain;    TECHNIQUE:  Single frontal view of the chest was performed.    COMPARISON:  09/08/2022.    FINDINGS:  Cardiac silhouette is stable in size.  Lungs are symmetrically expanded.  No evidence of new focal consolidative process, pneumothorax, or significant pleural effusion.  No acute osseous abnormality identified.

## 2022-10-07 NOTE — HPI
Ms. Yanni Kaiser is a 52 y.o. female, with PMH of HTN, HLD, CKD-III, anemia of chronic disease, GERD, asthma, COPD, T2DM, opioid dependence, chronic back pain, who presented to [facility] ED on 10/6/22 due to  shortness of breath. She notes associated body aches.  cough x 2 day, fever, wheezing. She states she used her albuterol inhaler without relief. She denies dysuria, chest pain, the was evaluated in the ED with labs showing stable H&H, no leukocytosis, + left shift. A metabolic panel showed no significant abnormalities. A d-dimer was elevated at 1.24. A Covid-19 test was positive. A flu A test was positive. A CTA showed no acute PE. She was treated with 3 DuoNebs. She was placed on OBS.

## 2022-10-08 VITALS
TEMPERATURE: 99 F | SYSTOLIC BLOOD PRESSURE: 132 MMHG | RESPIRATION RATE: 20 BRPM | WEIGHT: 293 LBS | DIASTOLIC BLOOD PRESSURE: 94 MMHG | HEIGHT: 64 IN | OXYGEN SATURATION: 99 % | HEART RATE: 79 BPM | BODY MASS INDEX: 50.02 KG/M2

## 2022-10-08 LAB
ANION GAP SERPL CALC-SCNC: 9 MMOL/L (ref 8–16)
BASOPHILS # BLD AUTO: 0.01 K/UL (ref 0–0.2)
BASOPHILS # BLD AUTO: 0.01 K/UL (ref 0–0.2)
BASOPHILS NFR BLD: 0.3 % (ref 0–1.9)
BASOPHILS NFR BLD: 0.3 % (ref 0–1.9)
BUN SERPL-MCNC: 18 MG/DL (ref 6–20)
CALCIUM SERPL-MCNC: 8.9 MG/DL (ref 8.7–10.5)
CHLORIDE SERPL-SCNC: 104 MMOL/L (ref 95–110)
CO2 SERPL-SCNC: 24 MMOL/L (ref 23–29)
CREAT SERPL-MCNC: 1.2 MG/DL (ref 0.5–1.4)
DIFFERENTIAL METHOD: ABNORMAL
DIFFERENTIAL METHOD: ABNORMAL
EOSINOPHIL # BLD AUTO: 0 K/UL (ref 0–0.5)
EOSINOPHIL # BLD AUTO: 0 K/UL (ref 0–0.5)
EOSINOPHIL NFR BLD: 0 % (ref 0–8)
EOSINOPHIL NFR BLD: 0 % (ref 0–8)
ERYTHROCYTE [DISTWIDTH] IN BLOOD BY AUTOMATED COUNT: 14.2 % (ref 11.5–14.5)
ERYTHROCYTE [DISTWIDTH] IN BLOOD BY AUTOMATED COUNT: 14.2 % (ref 11.5–14.5)
EST. GFR  (NO RACE VARIABLE): 55 ML/MIN/1.73 M^2
GLUCOSE SERPL-MCNC: 111 MG/DL (ref 70–110)
HCT VFR BLD AUTO: 42.9 % (ref 37–48.5)
HCT VFR BLD AUTO: 42.9 % (ref 37–48.5)
HGB BLD-MCNC: 13.7 G/DL (ref 12–16)
HGB BLD-MCNC: 13.7 G/DL (ref 12–16)
IMM GRANULOCYTES # BLD AUTO: 0.03 K/UL (ref 0–0.04)
IMM GRANULOCYTES # BLD AUTO: 0.03 K/UL (ref 0–0.04)
IMM GRANULOCYTES NFR BLD AUTO: 0.8 % (ref 0–0.5)
IMM GRANULOCYTES NFR BLD AUTO: 0.8 % (ref 0–0.5)
LYMPHOCYTES # BLD AUTO: 0.8 K/UL (ref 1–4.8)
LYMPHOCYTES # BLD AUTO: 0.8 K/UL (ref 1–4.8)
LYMPHOCYTES NFR BLD: 20.6 % (ref 18–48)
LYMPHOCYTES NFR BLD: 20.6 % (ref 18–48)
MAGNESIUM SERPL-MCNC: 2.3 MG/DL (ref 1.6–2.6)
MCH RBC QN AUTO: 30.2 PG (ref 27–31)
MCH RBC QN AUTO: 30.2 PG (ref 27–31)
MCHC RBC AUTO-ENTMCNC: 31.9 G/DL (ref 32–36)
MCHC RBC AUTO-ENTMCNC: 31.9 G/DL (ref 32–36)
MCV RBC AUTO: 95 FL (ref 82–98)
MCV RBC AUTO: 95 FL (ref 82–98)
MONOCYTES # BLD AUTO: 0.8 K/UL (ref 0.3–1)
MONOCYTES # BLD AUTO: 0.8 K/UL (ref 0.3–1)
MONOCYTES NFR BLD: 19.3 % (ref 4–15)
MONOCYTES NFR BLD: 19.3 % (ref 4–15)
NEUTROPHILS # BLD AUTO: 2.3 K/UL (ref 1.8–7.7)
NEUTROPHILS # BLD AUTO: 2.3 K/UL (ref 1.8–7.7)
NEUTROPHILS NFR BLD: 59 % (ref 38–73)
NEUTROPHILS NFR BLD: 59 % (ref 38–73)
NRBC BLD-RTO: 0 /100 WBC
NRBC BLD-RTO: 0 /100 WBC
PLATELET # BLD AUTO: 305 K/UL (ref 150–450)
PLATELET # BLD AUTO: 305 K/UL (ref 150–450)
PMV BLD AUTO: 9 FL (ref 9.2–12.9)
PMV BLD AUTO: 9 FL (ref 9.2–12.9)
POCT GLUCOSE: 102 MG/DL (ref 70–110)
POTASSIUM SERPL-SCNC: 4.1 MMOL/L (ref 3.5–5.1)
RBC # BLD AUTO: 4.53 M/UL (ref 4–5.4)
RBC # BLD AUTO: 4.53 M/UL (ref 4–5.4)
SODIUM SERPL-SCNC: 137 MMOL/L (ref 136–145)
WBC # BLD AUTO: 3.94 K/UL (ref 3.9–12.7)
WBC # BLD AUTO: 3.94 K/UL (ref 3.9–12.7)

## 2022-10-08 PROCEDURE — A4216 STERILE WATER/SALINE, 10 ML: HCPCS | Performed by: PHYSICIAN ASSISTANT

## 2022-10-08 PROCEDURE — 83735 ASSAY OF MAGNESIUM: CPT | Performed by: PHYSICIAN ASSISTANT

## 2022-10-08 PROCEDURE — 63600175 PHARM REV CODE 636 W HCPCS: Mod: TB | Performed by: PHYSICIAN ASSISTANT

## 2022-10-08 PROCEDURE — 94640 AIRWAY INHALATION TREATMENT: CPT

## 2022-10-08 PROCEDURE — 25000242 PHARM REV CODE 250 ALT 637 W/ HCPCS: Performed by: PHYSICIAN ASSISTANT

## 2022-10-08 PROCEDURE — 96366 THER/PROPH/DIAG IV INF ADDON: CPT

## 2022-10-08 PROCEDURE — 94761 N-INVAS EAR/PLS OXIMETRY MLT: CPT

## 2022-10-08 PROCEDURE — 25000003 PHARM REV CODE 250: Performed by: PHYSICIAN ASSISTANT

## 2022-10-08 PROCEDURE — 80048 BASIC METABOLIC PNL TOTAL CA: CPT | Performed by: PHYSICIAN ASSISTANT

## 2022-10-08 PROCEDURE — 25000003 PHARM REV CODE 250: Performed by: STUDENT IN AN ORGANIZED HEALTH CARE EDUCATION/TRAINING PROGRAM

## 2022-10-08 PROCEDURE — 36415 COLL VENOUS BLD VENIPUNCTURE: CPT | Performed by: PHYSICIAN ASSISTANT

## 2022-10-08 PROCEDURE — S4991 NICOTINE PATCH NONLEGEND: HCPCS | Performed by: STUDENT IN AN ORGANIZED HEALTH CARE EDUCATION/TRAINING PROGRAM

## 2022-10-08 PROCEDURE — G0378 HOSPITAL OBSERVATION PER HR: HCPCS

## 2022-10-08 PROCEDURE — 99900035 HC TECH TIME PER 15 MIN (STAT)

## 2022-10-08 PROCEDURE — 85025 COMPLETE CBC W/AUTO DIFF WBC: CPT | Performed by: PHYSICIAN ASSISTANT

## 2022-10-08 RX ORDER — OSELTAMIVIR PHOSPHATE 75 MG/1
75 CAPSULE ORAL 2 TIMES DAILY
Qty: 8 CAPSULE | Refills: 0 | Status: SHIPPED | OUTPATIENT
Start: 2022-10-08 | End: 2022-10-15

## 2022-10-08 RX ORDER — BENZONATATE 200 MG/1
200 CAPSULE ORAL 3 TIMES DAILY PRN
Qty: 21 CAPSULE | Refills: 0 | Status: SHIPPED | OUTPATIENT
Start: 2022-10-08 | End: 2022-10-15

## 2022-10-08 RX ADMIN — FLUTICASONE PROPIONATE 100 MCG: 50 SPRAY, METERED NASAL at 08:10

## 2022-10-08 RX ADMIN — QUETIAPINE FUMARATE 25 MG: 25 TABLET ORAL at 08:10

## 2022-10-08 RX ADMIN — OSELTAMIVIR PHOSPHATE 75 MG: 75 CAPSULE ORAL at 08:10

## 2022-10-08 RX ADMIN — DEXAMETHASONE 6 MG: 2 TABLET ORAL at 08:10

## 2022-10-08 RX ADMIN — CETIRIZINE HYDROCHLORIDE 10 MG: 10 TABLET, FILM COATED ORAL at 08:10

## 2022-10-08 RX ADMIN — REMDESIVIR 100 MG: 100 INJECTION, POWDER, LYOPHILIZED, FOR SOLUTION INTRAVENOUS at 08:10

## 2022-10-08 RX ADMIN — Medication 1 PATCH: at 08:10

## 2022-10-08 RX ADMIN — THERA TABS 1 TABLET: TAB at 08:10

## 2022-10-08 RX ADMIN — ALBUTEROL SULFATE 2 PUFF: 90 AEROSOL, METERED RESPIRATORY (INHALATION) at 08:10

## 2022-10-08 RX ADMIN — PREGABALIN 50 MG: 50 CAPSULE ORAL at 08:10

## 2022-10-08 RX ADMIN — OXYCODONE HYDROCHLORIDE AND ACETAMINOPHEN 500 MG: 500 TABLET ORAL at 08:10

## 2022-10-08 RX ADMIN — Medication 10 ML: at 06:10

## 2022-10-08 RX ADMIN — VENLAFAXINE HYDROCHLORIDE 75 MG: 37.5 CAPSULE, EXTENDED RELEASE ORAL at 08:10

## 2022-10-08 NOTE — PLAN OF CARE
10/08/22 1026   Post-Acute Status   Post-Acute Authorization Other  (Follow-up)   Other Status No Post-Acute Service Needs   Hospital Resources/Appts/Education Provided Appointments scheduled by Navigator/Coordinator;Provided education on problems/symptoms using teachback;Provided patient/caregiver with written discharge plan information;Appointments scheduled and added to AVS   Discharge Delays None known at this time   Discharge Plan   Discharge Plan A Home with family  (Follow-Up)   Discharge Plan B Home with family  (Follow-Up)

## 2022-10-08 NOTE — PLAN OF CARE
[facility] [place] - Telemetry  Discharge Final Note    Primary Care Provider: Carlyle Gordillo MD    Expected Discharge Date: 10/8/2022    Final Discharge Note (most recent)       Final Note - 10/08/22 1028          Final Note    Assessment Type Final Discharge Note     Anticipated Discharge Disposition Home or Self Care     What phone number can be called within the next 1-3 days to see how you are doing after discharge? --   [phone]    Hospital Resources/Appts/Education Provided Appointments scheduled by Navigator/Coordinator;Provided education on problems/symptoms using teachback;Provided patient/caregiver with written discharge plan information;Appointments scheduled and added to AVS        Post-Acute Status    Post-Acute Authorization Other   Home; follow-up    Other Status No Post-Acute Service Needs     Discharge Delays None known at this time                     Important Message from Medicare             Contact Info       Carlyle Gordillo MD   Specialty: Family Medicine, Wound Care   Relationship: PCP - General    [place] [id number]   Phone: [phone]       Next Steps: Go on 10/14/2022    Instructions: @ 10:00am  Out-Patient [facility] Follow-up

## 2022-10-08 NOTE — PROGRESS NOTES
Patient is ready and clear for discharge from Case Management's perspective; informed patient's nurse, Keenan. Discussed and provide patient with written discharge instruction and education.

## 2022-10-11 ENCOUNTER — OUTPATIENT CASE MANAGEMENT (OUTPATIENT)
Dept: ADMINISTRATIVE | Facility: OTHER | Age: 52
End: 2022-10-11
Payer: MEDICARE

## 2022-10-11 NOTE — PROGRESS NOTES
Outpatient Care Management  Plan of Care Follow Up Visit    Patient: Yanni Kaiser  MRN: [id number]  Date of Service: 10/11/2022  Completed by: Mague Mane RN  Referral Date: 09/09/2022    Reason for Visit   Patient presents with    Update Plan Of Care       Brief Summary: Pt voiced that she not feeling well at this time. Cough noted with hoarseness during the call. Pt voiced that she has not started the tamiflu d/t [facility] is out of the medication. Offered pt to call other pharmacies for the medication. Pt voiced that she receives her medicines from [facility]. [facility] on the [facility] was called and informed this CM that they had the medication available. Reports that they were able to see that the medication was sent to [facility]. Reports that she will have the medication transferred and the medication will be filled at [facility]. Pt called to inform that [facility] will fill the tamiflu prescription. Pt voiced understanding at this time.    Patient Summary     Involvement of Care:  Do I have permission to speak with other family members about your care?   yes    Patient Reported Labs & Vitals:  1.  Any Patient Reported Labs & Vitals?   no  2.  Patient Reported Blood Pressure:     3.  Patient Reported Pulse:     4.  Patient Reported Weight (Kg):     5.  Patient Reported Blood Glucose (mg/dl):       Medical and social history was reviewed with patient and/or caregiver.     Clinical Assessment     Reviewed and provided basic information on available community resources for mental health, transportation, wellness resources, and palliative care programs with patient and/or caregiver.     Complex Care Plan     Care plan was discussed and completed today with input from patient and/or caregiver.    Patient Instructions     Instructions were provided via the code-laboration patient resources and are available for the patient to view on the patient portal.    Next Steps: Informed pt that this CM will  f/u next week. Informed pt to contact this CM for any concerns.      Follow up in about 1 week (around 10/18/2022) for RN Follow.    Todays OPCM Self-Management Care Plan was developed with the patients/caregivers input and was based on identified barriers from todays assessment.  Goals were written today with the patient/caregiver and the patient has agreed to work towards these goals to improve his/her overall well-being. Patient verbalized understanding of the care plan, goals, and all of today's instructions. Encouraged patient/caregiver to communicate with his/her physician and health care team about health conditions and the treatment plan.  Provided my contact information today and encouraged patient/caregiver to call me with any questions as needed.

## 2022-10-12 ENCOUNTER — PATIENT MESSAGE (OUTPATIENT)
Dept: ALLERGY | Facility: CLINIC | Age: 52
End: 2022-10-12
Payer: MEDICARE

## 2022-10-14 ENCOUNTER — OFFICE VISIT (OUTPATIENT)
Dept: FAMILY MEDICINE | Facility: CLINIC | Age: 52
End: 2022-10-14
Payer: MEDICARE

## 2022-10-14 VITALS
BODY MASS INDEX: 50.02 KG/M2 | WEIGHT: 293 LBS | TEMPERATURE: 99 F | DIASTOLIC BLOOD PRESSURE: 82 MMHG | HEART RATE: 89 BPM | OXYGEN SATURATION: 91 % | SYSTOLIC BLOOD PRESSURE: 136 MMHG | HEIGHT: 64 IN

## 2022-10-14 DIAGNOSIS — J10.1 INFLUENZA A: ICD-10-CM

## 2022-10-14 DIAGNOSIS — D63.8 ANEMIA OF CHRONIC DISEASE: Chronic | ICD-10-CM

## 2022-10-14 DIAGNOSIS — Z78.9 DECREASED ACTIVITIES OF DAILY LIVING (ADL): ICD-10-CM

## 2022-10-14 DIAGNOSIS — Z09 HOSPITAL DISCHARGE FOLLOW-UP: Primary | ICD-10-CM

## 2022-10-14 DIAGNOSIS — U07.1 PNEUMONIA DUE TO COVID-19 VIRUS: ICD-10-CM

## 2022-10-14 DIAGNOSIS — U07.1 COVID-19 VIRUS INFECTION: ICD-10-CM

## 2022-10-14 DIAGNOSIS — T36.95XA ANTIBIOTIC-INDUCED YEAST INFECTION: ICD-10-CM

## 2022-10-14 DIAGNOSIS — J44.1 ASTHMA EXACERBATION IN COPD: ICD-10-CM

## 2022-10-14 DIAGNOSIS — J45.41 MODERATE PERSISTENT ASTHMA WITH ACUTE EXACERBATION: ICD-10-CM

## 2022-10-14 DIAGNOSIS — N18.31 STAGE 3A CHRONIC KIDNEY DISEASE: Chronic | ICD-10-CM

## 2022-10-14 DIAGNOSIS — J45.901 ASTHMA EXACERBATION IN COPD: ICD-10-CM

## 2022-10-14 DIAGNOSIS — B37.9 ANTIBIOTIC-INDUCED YEAST INFECTION: ICD-10-CM

## 2022-10-14 DIAGNOSIS — R06.2 WHEEZING: ICD-10-CM

## 2022-10-14 DIAGNOSIS — J12.82 PNEUMONIA DUE TO COVID-19 VIRUS: ICD-10-CM

## 2022-10-14 DIAGNOSIS — E11.51 DIABETES MELLITUS WITH PERIPHERAL CIRCULATORY DISORDER: ICD-10-CM

## 2022-10-14 DIAGNOSIS — I10 ESSENTIAL HYPERTENSION: Chronic | ICD-10-CM

## 2022-10-14 DIAGNOSIS — N18.31 TYPE 2 DIABETES MELLITUS WITH STAGE 3A CHRONIC KIDNEY DISEASE, WITH LONG-TERM CURRENT USE OF INSULIN: ICD-10-CM

## 2022-10-14 DIAGNOSIS — E11.22 TYPE 2 DIABETES MELLITUS WITH STAGE 3A CHRONIC KIDNEY DISEASE, WITH LONG-TERM CURRENT USE OF INSULIN: ICD-10-CM

## 2022-10-14 DIAGNOSIS — Z79.4 TYPE 2 DIABETES MELLITUS WITH STAGE 3A CHRONIC KIDNEY DISEASE, WITH LONG-TERM CURRENT USE OF INSULIN: ICD-10-CM

## 2022-10-14 DIAGNOSIS — Z74.09 IMPAIRED FUNCTIONAL MOBILITY, BALANCE, GAIT, AND ENDURANCE: ICD-10-CM

## 2022-10-14 PROCEDURE — 3066F PR DOCUMENTATION OF TREATMENT FOR NEPHROPATHY: ICD-10-PCS | Mod: CPTII,S$GLB,, | Performed by: NURSE PRACTITIONER

## 2022-10-14 PROCEDURE — 3079F DIAST BP 80-89 MM HG: CPT | Mod: CPTII,S$GLB,, | Performed by: NURSE PRACTITIONER

## 2022-10-14 PROCEDURE — 3044F HG A1C LEVEL LT 7.0%: CPT | Mod: CPTII,S$GLB,, | Performed by: NURSE PRACTITIONER

## 2022-10-14 PROCEDURE — 3066F NEPHROPATHY DOC TX: CPT | Mod: CPTII,S$GLB,, | Performed by: NURSE PRACTITIONER

## 2022-10-14 PROCEDURE — 99214 OFFICE O/P EST MOD 30 MIN: CPT | Mod: 25,S$GLB,, | Performed by: NURSE PRACTITIONER

## 2022-10-14 PROCEDURE — 96372 PR INJECTION,THERAP/PROPH/DIAG2ST, IM OR SUBCUT: ICD-10-PCS | Mod: 59,S$GLB,, | Performed by: NURSE PRACTITIONER

## 2022-10-14 PROCEDURE — 3075F PR MOST RECENT SYSTOLIC BLOOD PRESS GE 130-139MM HG: ICD-10-PCS | Mod: CPTII,S$GLB,, | Performed by: NURSE PRACTITIONER

## 2022-10-14 PROCEDURE — 4010F PR ACE/ARB THEARPY RXD/TAKEN: ICD-10-PCS | Mod: CPTII,S$GLB,, | Performed by: NURSE PRACTITIONER

## 2022-10-14 PROCEDURE — 1160F PR REVIEW ALL MEDS BY PRESCRIBER/CLIN PHARMACIST DOCUMENTED: ICD-10-PCS | Mod: CPTII,S$GLB,, | Performed by: NURSE PRACTITIONER

## 2022-10-14 PROCEDURE — 1160F RVW MEDS BY RX/DR IN RCRD: CPT | Mod: CPTII,S$GLB,, | Performed by: NURSE PRACTITIONER

## 2022-10-14 PROCEDURE — 99999 PR PBB SHADOW E&M-EST. PATIENT-LVL V: CPT | Mod: PBBFAC,,, | Performed by: NURSE PRACTITIONER

## 2022-10-14 PROCEDURE — 96372 THER/PROPH/DIAG INJ SC/IM: CPT | Mod: 59,S$GLB,, | Performed by: NURSE PRACTITIONER

## 2022-10-14 PROCEDURE — 3044F PR MOST RECENT HEMOGLOBIN A1C LEVEL <7.0%: ICD-10-PCS | Mod: CPTII,S$GLB,, | Performed by: NURSE PRACTITIONER

## 2022-10-14 PROCEDURE — 94640 PR INHAL RX, AIRWAY OBST/DX SPUTUM INDUCT: ICD-10-PCS | Mod: S$GLB,,, | Performed by: NURSE PRACTITIONER

## 2022-10-14 PROCEDURE — 4010F ACE/ARB THERAPY RXD/TAKEN: CPT | Mod: CPTII,S$GLB,, | Performed by: NURSE PRACTITIONER

## 2022-10-14 PROCEDURE — 3061F PR NEG MICROALBUMINURIA RESULT DOCUMENTED/REVIEW: ICD-10-PCS | Mod: CPTII,S$GLB,, | Performed by: NURSE PRACTITIONER

## 2022-10-14 PROCEDURE — 99214 PR OFFICE/OUTPT VISIT, EST, LEVL IV, 30-39 MIN: ICD-10-PCS | Mod: 25,S$GLB,, | Performed by: NURSE PRACTITIONER

## 2022-10-14 PROCEDURE — 99999 PR PBB SHADOW E&M-EST. PATIENT-LVL V: ICD-10-PCS | Mod: PBBFAC,,, | Performed by: NURSE PRACTITIONER

## 2022-10-14 PROCEDURE — 1159F MED LIST DOCD IN RCRD: CPT | Mod: CPTII,S$GLB,, | Performed by: NURSE PRACTITIONER

## 2022-10-14 PROCEDURE — 3061F NEG MICROALBUMINURIA REV: CPT | Mod: CPTII,S$GLB,, | Performed by: NURSE PRACTITIONER

## 2022-10-14 PROCEDURE — 3079F PR MOST RECENT DIASTOLIC BLOOD PRESSURE 80-89 MM HG: ICD-10-PCS | Mod: CPTII,S$GLB,, | Performed by: NURSE PRACTITIONER

## 2022-10-14 PROCEDURE — 3075F SYST BP GE 130 - 139MM HG: CPT | Mod: CPTII,S$GLB,, | Performed by: NURSE PRACTITIONER

## 2022-10-14 PROCEDURE — 94640 AIRWAY INHALATION TREATMENT: CPT | Mod: S$GLB,,, | Performed by: NURSE PRACTITIONER

## 2022-10-14 PROCEDURE — 1159F PR MEDICATION LIST DOCUMENTED IN MEDICAL RECORD: ICD-10-PCS | Mod: CPTII,S$GLB,, | Performed by: NURSE PRACTITIONER

## 2022-10-14 RX ORDER — FLUCONAZOLE 150 MG/1
150 TABLET ORAL DAILY
Qty: 3 TABLET | Refills: 0 | Status: SHIPPED | OUTPATIENT
Start: 2022-10-14 | End: 2022-10-15

## 2022-10-14 RX ORDER — IPRATROPIUM BROMIDE AND ALBUTEROL SULFATE 2.5; .5 MG/3ML; MG/3ML
3 SOLUTION RESPIRATORY (INHALATION)
Status: COMPLETED | OUTPATIENT
Start: 2022-10-14 | End: 2022-10-14

## 2022-10-14 RX ORDER — METHYLPREDNISOLONE 4 MG/1
TABLET ORAL
Qty: 1 EACH | Refills: 0 | Status: SHIPPED | OUTPATIENT
Start: 2022-10-14 | End: 2022-11-02 | Stop reason: ALTCHOICE

## 2022-10-14 RX ORDER — AMOXICILLIN AND CLAVULANATE POTASSIUM 875; 125 MG/1; MG/1
1 TABLET, FILM COATED ORAL EVERY 12 HOURS
Qty: 20 TABLET | Refills: 0 | Status: SHIPPED | OUTPATIENT
Start: 2022-10-14 | End: 2022-10-24

## 2022-10-14 RX ADMIN — IPRATROPIUM BROMIDE AND ALBUTEROL SULFATE 3 ML: 2.5; .5 SOLUTION RESPIRATORY (INHALATION) at 10:10

## 2022-10-14 NOTE — PROGRESS NOTES
History of Present Illness   Yanni Kaiser is a 52 y.o. woman with medical history as listed below who presents today for hospital discharge follow-up, SOB, Influenza A, COVID-19.    HPI:   Ms. Yanni Kaiser is a 52 y.o. female, with PMH of HTN, HLD, CKD-III, anemia of chronic disease, GERD, asthma, COPD, T2DM, opioid dependence, chronic back pain, who presented to [facility] ED on 10/6/22 due to  shortness of breath. She notes associated body aches.  cough x 2 day, fever, wheezing. She states she used her albuterol inhaler without relief. She denies dysuria, chest pain, the was evaluated in the ED with labs showing stable H&H, no leukocytosis, + left shift. A metabolic panel showed no significant abnormalities. A d-dimer was elevated at 1.24. A Covid-19 test was positive. A flu A test was positive. A CTA showed no acute PE. She was treated with 3 DuoNebs. She was placed on OBS.     Hospital Course:  Initiate targeted therapy with Remdesivir, 200mg IV x1, followed by 100mg IV daily x5 days total, Dexamethasone PO/IV 6mg daily x10 days and Anticoagulation, Patient admitted to non-critical care unit- Will initiate full dose anticoagulation with Weight based lovenox 1mg/kg IV q12, Maintain oxygen saturations 92-96% via Nasal Cannula  LPM and monitor with continuous/intermittent pulse oximetry. , Inhaled bronchodilators as needed for shortness of breath. and Continuous cardiac monitoring.    Post Discharge:  She reports persistent cough, wheezing, shortness of breath, body aches, fatigue, and nausea since discharge. She is doing nebulizer treatments BID and taking all other medications as prescribed. She is on her last day of Tamiflu. She is noted to have audible wheezing and labored breathing upon presentation to clinic today. She reports normal glucose readings at home, no episodes of significant hyper/hypoglycemia. She plans to establish follow-up with pulmonology.    Transitional Care Note    Family and/or  Caretaker present at visit?  No.  Diagnostic tests reviewed/disposition: No diagnosic tests pending after this hospitalization.  Disease/illness education: reviewed treatment for acute asthma exacerbation.  Home health/community services discussion/referrals: Patient does not have home health established from hospital visit.  They do not need home health.  If needed, we will set up home health for the patient.   Establishment or re-establishment of referral orders for community resources: No other necessary community resources.   Discussion with other health care providers: No discussion with other health care providers necessary.     Past Medical History:   Diagnosis Date    Allergy     Anemia     Anxiety     Asthma     Back pain     Chronic bronchitis     Chronic obstructive pulmonary disease, unspecified COPD type 2022    Cigarette smoker     DDD (degenerative disc disease), lumbar 2020    Depression     Diabetes mellitus with peripheral circulatory disorder 2022    Diabetes with neurologic complications     DUB (dysfunctional uterine bleeding) 10/16/2018    GERD (gastroesophageal reflux disease)     High cholesterol     Hyperprolactinemia     Hypertension     Influenza A 2020    Neuromuscular disorder     Obese body habitus     Obesity     Pseudotumor cerebri     Renal manifestation of secondary diabetes mellitus     Respiratory failure     Seizures     Simple endometrial hyperplasia     Sleep apnea     Smoker     Tobacco dependence     Urinary incontinence        Past Surgical History:   Procedure Laterality Date    ANTROSTOMY OF MAXILLARY SINUS AT INFERIOR NASAL MEATUS  10/22/2021    Procedure: MAXILLARY ANTROSTOMY, AT INFERIOR NASAL MEATUS;  Surgeon: John Prado III, MD;  Location: [facility];  Service: ENT;;    BREAST CYST ASPIRATION       SECTION      X 1    CHOLECYSTECTOMY      COLONOSCOPY      COLONOSCOPY N/A 2019    Procedure: COLONOSCOPY;  Surgeon: Jagruti Sweeney  MD;  Location: [facility] (2ND FLR);  Service: Endoscopy;  Laterality: N/A;  BMI is 63/gastroparesis/3 days full liquid diet 1 day clear liquid see telephone encounter by Ciara Pinto [facility]    EPIDURAL STEROID INJECTION N/A 09/11/2020    Procedure: INJECTION, STEROID, EPIDURAL, L5-S1 IL;  Surgeon: Lawrence Marin MD;  Location: [facility]IN [facility]GT;  Service: Pain Management;  Laterality: N/A;    ESOPHAGEAL MANOMETRY WITH MEASUREMENT OF IMPEDANCE N/A 11/05/2019    Procedure: MANOMETRY-ESOPHAGEAL-WITH IMPEDANCE;  Surgeon: Jagruti Sweeney MD;  Location: [facility] (2ND FLR);  Service: Endoscopy;  Laterality: N/A;  Hold Narcotics x 1 days   Hold TCA x 1 days  Propofol only. No fentanyl or benzodiazepine during sedation. If additional sedation needed, discuss with Dr. Sweeney.  10/29 - pt confirmed appt    ESOPHAGOGASTRODUODENOSCOPY N/A 01/14/2019    Procedure: EGD (ESOPHAGOGASTRODUODENOSCOPY);  Surgeon: Jagruti Sweeney MD;  Location: [facility] ([address] [place] [address]);  Service: Endoscopy;  Laterality: N/A;  BMI is 63/gastroparesis/3 days full liquid diet 1 day clear liquid see telephone encounter by Ciara Pinto [facility]    ESOPHAGOGASTRODUODENOSCOPY N/A 11/05/2019    Procedure: ESOPHAGOGASTRODUODENOSCOPY (EGD);  Surgeon: Jagruti Sweeney MD;  [facility]  Service: Endoscopy;  Laterality: N/A;  EGD with EndoFlip /+/- Botox  2nd floor for gastroparesis/BMI 63 **367lbs**  Bariatric Stretcher needed  Manometry probe to be placed endoscopically during EGD procedure  Due to change in protocol, Full liquid diet for 3 days/ 1 day of clear liquids  Hi    ESOPHAGOGASTRODUODENOSCOPY N/A 12/14/2020    Procedure: ESOPHAGOGASTRODUODENOSCOPY (EGD) with BRAVO;  Surgeon: Jagruti Sweeney MD;  Location: [facility] (2ND FLR);  Service: Endoscopy;  Laterality: N/A;  with Dilation  2nd floor due to BMI 56.20 (327 lbs) and gastroparesis  3 days full liquid diet and 1 day clears    ESOPHAGOGASTRODUODENOSCOPY N/A 04/15/2021    Procedure:  ESOPHAGOGASTRODUODENOSCOPY (EGD);  Surgeon: Jagruti Sweeney MD;  Location: [facility] ENDO (2ND FLR);  Service: Endoscopy;  Laterality: N/A;  Endoflip  2nd floor-gastroparesis, BMI 57.18, bariatric stretcher  full liquid diet x3 days, clear liquid diet x1 day prior to procedure  propofol only  covid test 4/12 primary care, instructions emailed-hospitals    FOOT FRACTURE SURGERY Left     FUNCTIONAL ENDOSCOPIC SINUS SURGERY (FESS) USING COMPUTER-ASSISTED NAVIGATION Bilateral 10/22/2021    Procedure: FESS, USING COMPUTER-ASSISTED NAVIGATION;  Surgeon: John Prado III, MD;  Location: [facility];  Service: ENT;  Laterality: Bilateral;  FOLLOW DR PRADO ANESTHESIA PROTOCAL / pt will stay 23 hour post surgery for SHARATH observation    HYSTEROSCOPY WITH DILATION AND CURETTAGE OF UTERUS N/A 10/16/2018    KJB    INJECTION OF ANESTHETIC AGENT AROUND NERVE Bilateral 10/23/2020    Procedure: BLOCK, NERVE  bilateral L3,4,5 MBB;  Surgeon: Lawrence Marin MD;  Location: [facility] PAIN MGT;  Service: Pain Management;  Laterality: Bilateral;  bilateral L3,4,5 MBB   consent needed    INJECTION OF ANESTHETIC AGENT AROUND NERVE Bilateral 02/18/2022    Procedure: BLOCK, NERVE, L3-L4-L5 MEDIAL BRANCH;  Surgeon: Lawrence Marin MD;  Location: [facility] PAIN [facility]GT;  Service: Pain Management;  Laterality: Bilateral;    PLANTAR FASCIOTOMY Left 11/18/2019    Procedure: FASCIOTOMY, PLANTAR;  Surgeon: Valentino Orourke DPM;  Location: [facility] 2ND FLR;  Service: Podiatry;  Laterality: Left;    RETINAL LASER PROCEDURE Left     SINUS SURGERY      SPHENOIDECTOMY Bilateral 10/22/2021    Procedure: SPHENOIDECTOMY;  Surgeon: John Prado III, MD;  Location: [facility] OR;  Service: ENT;  Laterality: Bilateral;    TRANSFORAMINAL EPIDURAL INJECTION OF STEROID Bilateral 06/24/2020    Procedure: INJECTION, STEROID, EPIDURAL, TRANSFORAMINAL APPROACH;  Surgeon: Lawrence Marin MD;  Location: [facility]IN [facility]GT;  Service: Pain Management;  Laterality: Bilateral;    TRANSFORAMINAL  "EPIDURAL INJECTION OF STEROID Bilateral 01/28/2022    Procedure: INJECTION, STEROID, EPIDURAL, TRANSFORAMINAL APPROACH  Bilateral TFESI L4/L5      NEEDS CONSENT;  Surgeon: Lawrence Marin MD;  [facility];  Service: Pain Management;  Laterality: Bilateral;    UPPER GASTROINTESTINAL ENDOSCOPY         Social History     Socioeconomic History    Marital status:     Number of children: 4   Occupational History    Occupation: disable   Tobacco Use    Smoking status: Every Day     Packs/day: 1.00     Years: 27.00     Pack years: 27.00     Types: Cigarettes     Start date: 1/1/1992     Passive exposure: Current    Smokeless tobacco: Never    Tobacco comments:     "1/2 pack per every 2 days or so"   Substance and Sexual Activity    Alcohol use: Yes     Alcohol/week: 1.0 standard drink     Types: 1 Shots of liquor per week     Comment: occasionally    Drug use: No    Sexual activity: Yes     Partners: Male     Birth control/protection: None     Comment:      Social Determinants of Health     Financial Resource Strain: High Risk    Difficulty of Paying Living Expenses: Very hard   Food Insecurity: Food Insecurity Present    Worried About Running Out of Food in the Last Year: Often true    Ran Out of Food in the Last Year: Sometimes true   Transportation Needs: Unmet Transportation Needs    Lack of Transportation (Medical): Yes    Lack of Transportation (Non-Medical): Yes   Physical Activity: Inactive    Days of Exercise per Week: 0 days    Minutes of Exercise per Session: 0 min   Stress: Stress Concern Present    Feeling of Stress : To some extent   Social Connections: Unknown    Frequency of Communication with Friends and Family: Three times a week    Frequency of Social Gatherings with Friends and Family: Once a week    Active Member of Clubs or Organizations: Yes    Attends Club or Organization Meetings: More than 4 times per year    Marital Status:    Housing Stability: High Risk    " Unable to Pay for Housing in the Last Year: Yes    Number of Places Lived in the Last Year: 1    Unstable Housing in the Last Year: No       Family History   Problem Relation Age of Onset    Hypertension Mother     Diabetes Mother     Colon cancer Mother 50    Colon polyps Mother     Cataracts Mother     Heart disease Father     COPD Father     Heart attack Father     Hypertension Father     Ulcers Father     Cataracts Father     Glaucoma Father     No Known Problems Brother     Diabetes Daughter         prediabetes    Diabetes Daughter         prediabetic    Hypertension Daughter     Obesity Daughter     No Known Problems Daughter     No Known Problems Son     No Known Problems Maternal Aunt     No Known Problems Maternal Uncle     No Known Problems Paternal Aunt     No Known Problems Paternal Uncle     Cancer Maternal Grandmother     Breast cancer Maternal Grandmother     Colon cancer Maternal Grandmother     Colon polyps Maternal Grandmother     No Known Problems Maternal Grandfather     No Known Problems Paternal Grandmother     No Known Problems Paternal Grandfather     Celiac disease Neg Hx     Cirrhosis Neg Hx     Crohn's disease Neg Hx     Cystic fibrosis Neg Hx     Esophageal cancer Neg Hx     Hemochromatosis Neg Hx     Inflammatory bowel disease Neg Hx     Irritable bowel syndrome Neg Hx     Liver cancer Neg Hx     Liver disease Neg Hx     Rectal cancer Neg Hx     Stomach cancer Neg Hx     Ulcerative colitis Neg Hx     Jonnie's disease Neg Hx     Tuberculosis Neg Hx     Lymphoma Neg Hx     Scleroderma Neg Hx     Rheum arthritis Neg Hx     Melanoma Neg Hx     Multiple sclerosis Neg Hx     Psoriasis Neg Hx     Lupus Neg Hx     Skin cancer Neg Hx     Amblyopia Neg Hx     Blindness Neg Hx     Macular degeneration Neg Hx     Retinal detachment Neg Hx     Strabismus Neg Hx     Stroke Neg Hx     Thyroid disease Neg Hx     Allergic rhinitis Neg Hx     Allergies Neg Hx     Angioedema Neg Hx     Asthma Neg Hx      "Eczema Neg Hx     Immunodeficiency Neg Hx     Rhinitis Neg Hx     Urticaria Neg Hx     Atopy Neg Hx        Review of Systems  Review of Systems   Constitutional:  Positive for chills and malaise/fatigue. Negative for fever.   HENT:  Positive for congestion and sore throat. Negative for ear pain and sinus pain.    Respiratory:  Positive for cough, sputum production, shortness of breath and wheezing. Negative for hemoptysis.    Cardiovascular:  Positive for chest pain (atypical). Negative for palpitations, orthopnea, claudication, leg swelling and PND.   Gastrointestinal:  Positive for nausea. Negative for blood in stool, heartburn, melena and vomiting.   Genitourinary:  Negative for dysuria, flank pain and hematuria.   Musculoskeletal:  Positive for myalgias.   Skin:  Negative for itching and rash.   Neurological:  Negative for dizziness, loss of consciousness and headaches.     A complete review of systems was otherwise negative.    Physical Exam  /82   Pulse 89   Temp 98.5 °F (36.9 °C) (Oral)   Ht 5' 4" (1.626 m)   Wt (!) 157.6 kg (347 lb 5.4 oz)   LMP 08/17/2019   SpO2 (!) 91%   BMI 59.62 kg/m²   General appearance: alert, appears stated age, cooperative, and mild distress with improvement after nebulizer treatment  Nose: clear discharge, moderate congestion, turbinates red, no sinus tenderness  Throat: lips, mucosa, and tongue normal; teeth and gums normal  Neck: no carotid bruit, no JVD, and supple, symmetrical, trachea midline  Lungs:  prior to nebulizer: expiratory wheezes throughout with increased effort and rate, after nebulizer: resolution of expiratory wheezes with normal effort and rate  Heart: regular rate and rhythm, S1, S2 normal, no murmur, click, rub or gallop  Extremities: extremities normal, atraumatic, no cyanosis or edema  Pulses: 2+ and symmetric  Skin: Skin color, texture, turgor normal. No rashes or lesions  Lymph nodes: Cervical, supraclavicular, and axillary nodes " normal.  Neurologic: Grossly normal    Assessment/Plan  Hospital discharge follow-up  Concern given her persistent NOWAK with wheezes since discharge.  See diagnosis specific recommendations as below.    Moderate persistent asthma with acute exacerbation  Duo neb and IM solumedrol provided in clinic.  Start medrol dose pack tomorrow.  Start antibiotics today.  Continue nebulizer treatments every 4-6 hours.  Follow-up in one week for close reassessment.  ER precautions discussed.  -     methylPREDNISolone sod suc(PF) injection 125 mg  -     albuterol-ipratropium 2.5 mg-0.5 mg/3 mL nebulizer solution 3 mL  -     methylPREDNISolone (MEDROL DOSEPACK) 4 mg tablet; use as directed  Dispense: 1 each; Refill: 0  -     amoxicillin-clavulanate 875-125mg (AUGMENTIN) 875-125 mg per tablet; Take 1 tablet by mouth every 12 (twelve) hours. for 10 days  Dispense: 20 tablet; Refill: 0    Influenza A  Continue Tamiflu.  Rest, hydration, OTC symptoms management.  Tyenol PRN.    COVID-19 virus infection  We discussed risk for long COVID-19 given her underlying chronic lung disease.  Keep follow-up with pulmonology.    Pneumonia due to COVID-19 virus  As above.    Wheezing  As above.  -     methylPREDNISolone sod suc(PF) injection 125 mg  -     albuterol-ipratropium 2.5 mg-0.5 mg/3 mL nebulizer solution 3 mL    Asthma exacerbation in COPD  As above.  Recommend close follow-up with pulmonology.    Type 2 diabetes mellitus with stage 3a chronic kidney disease, with long-term current use of insulin  The current medical regimen is effective;  continue present plan and medications.  Monitor glucose closely while taking prednisone.    Diabetes mellitus with peripheral circulatory disorder  The current medical regimen is effective;  continue present plan and medications.  As above.    Essential hypertension  The current medical regimen is effective;  continue present plan and medications.    Stage 3a chronic kidney disease  The current medical  regimen is effective;  continue present plan and medications.  Avoid nephrotoxic agents such as NSAID, adequate hydration, low sodium/renal diet.    Anemia of chronic disease  The current medical regimen is effective;  continue present plan and medications.    Impaired functional mobility, balance, gait, and endurance  The current medical regimen is effective;  continue present plan and medications.    Decreased activities of daily living (ADL)  The current medical regimen is effective;  continue present plan and medications.    Antibiotic-induced yeast infection  Diflucan provided.  -     fluconazole (DIFLUCAN) 150 MG Tab; Take 1 tablet (150 mg total) by mouth once daily. for 1 day  Dispense: 3 tablet; Refill: 0    Patient has verbalized understanding and is in agreement with plan of care.    Follow up in about 1 week (around 10/21/2022).

## 2022-10-14 NOTE — PROGRESS NOTES
Patient received injection to left upper outer gluteus  ,pt tolerated injection well. Received duo neb tx, tolerated well.

## 2022-10-15 NOTE — DISCHARGE SUMMARY
[facility] Medicine  Discharge Summary      Patient Name: Yanni Kaiser  MRN: [id number]  Patient Class: OP- Observation  Admission Date: 10/6/2022  Hospital Length of Stay: 0 days  Discharge Date and Time: 10/8/2022  1:04 PM  Attending Physician: No att. providers found   Discharging Provider: Koko Quezada MD  Primary Care Provider: Carlyle Gordillo MD      HPI:   Ms. Yanni Kaiser is a 52 y.o. female, with PMH of HTN, HLD, CKD-III, anemia of chronic disease, GERD, asthma, COPD, T2DM, opioid dependence, chronic back pain, who presented to [facility] ED on 10/6/22 due to  shortness of breath. She notes associated body aches.  cough x 2 day, fever, wheezing. She states she used her albuterol inhaler without relief. She denies dysuria, chest pain, the was evaluated in the ED with labs showing stable H&H, no leukocytosis, + left shift. A metabolic panel showed no significant abnormalities. A d-dimer was elevated at 1.24. A Covid-19 test was positive. A flu A test was positive. A CTA showed no acute PE. She was treated with 3 DuoNebs. She was placed on OBS.       * No surgery found *      Hospital Course:   52y F w/ obstructive airway disease who presented with shortness of breath. Her testing revealed positive COVID and Influenza tests. She had recently tested positive for COVID so it was felt that this was likely not indicative of a new infection. She was treated with remdesivir out of an abundance of caution. She received tamiflu for influenza. By discharge she was still having flu symptoms but was respiratorily stable and requesting discharge. She was discharged to follow up w/ PCP and pulmonology. All questions answered, pt in agreement with plan.       Goals of Care Treatment Preferences:  Code Status: Full Code      Consults:     No new Assessment & Plan notes have been filed under this hospital service since the last note was generated.  Service: Hospital Medicine    Final Active  Diagnoses:    Diagnosis Date Noted POA    PRINCIPAL PROBLEM:  Pneumonia due to COVID-19 virus [U07.1, J12.82] 09/08/2022 Yes    COVID-19 virus infection [U07.1] 10/07/2022 Yes    Moderate persistent asthma without complication [J45.40] 03/12/2021 Yes    Influenza A [J10.1] 01/16/2020 Yes    Epilepsy [G40.909] 07/30/2016 Yes     Chronic    Essential hypertension [I10] 02/04/2016 Yes     Chronic    Anemia of chronic disease [D63.8] 06/10/2015 Yes     Chronic    SHARATH (obstructive sleep apnea) [G47.33] 03/27/2014 Yes      Problems Resolved During this Admission:       Discharged Condition: good    Disposition: Home or Self Care    Follow Up:   Follow-up Information     Carlyle Gordillo MD. Go on 10/14/2022.    Specialties: Family Medicine, Wound Care  Why: @ 10:00am  Out-Patient [facility] Hospital Follow-up  Contact information:  [address]  [place] [id number] [phone]                       Patient Instructions:   No discharge procedures on file.    Significant Diagnostic Studies: as above    Pending Diagnostic Studies:     None         Medications:  Reconciled Home Medications:      Medication List      START taking these medications    benzonatate 200 MG capsule  Commonly known as: TESSALON  Take 1 capsule (200 mg total) by mouth 3 (three) times daily as needed for Cough.     oseltamivir 75 MG capsule  Commonly known as: TAMIFLU  Take 1 capsule (75 mg total) by mouth 2 (two) times daily. for 4 days        CONTINUE taking these medications    * albuterol 90 mcg/actuation inhaler  Commonly known as: PROVENTIL/VENTOLIN HFA  Inhale 2 puffs into the lungs every 6 (six) hours as needed for Wheezing or Shortness of Breath. Rescue     * albuterol 2.5 mg /3 mL (0.083 %) nebulizer solution  Commonly known as: PROVENTIL  Take 3 mLs (2.5 mg total) by nebulization every 6 (six) hours as needed for Wheezing. Rescue     albuterol-ipratropium 2.5 mg-0.5 mg/3 mL nebulizer solution  Commonly known as: DUO-NEB  Take 3  mLs by nebulization every 6 (six) hours as needed for Wheezing. Rescue     alcohol swabs [facility]  Commonly known as: BD ALCOHOL SWABS  Apply 1 each topically 2 (two) times a day.     amitriptyline 10 MG tablet  Commonly known as: ELAVIL  TAKE 1 TABLET(10 MG) BY MOUTH EVERY NIGHT AS NEEDED FOR INSOMNIA     ammonium lactate 12 % Crea  Apply twice daily to affected parts both feet as needed.     azelastine 137 mcg (0.1 %) nasal spray  Commonly known as: ASTELIN  2 sprays (274 mcg total) by Nasal route 2 (two) times daily.     blood sugar diagnostic Strp  1 each by Misc.(Non-Drug; Combo Route) route 4 (four) times daily before meals and nightly. ACCU CHEK ELVIA PLUS METER     blood-glucose meter Misc  Commonly known as: ACCU-CHEK ELVIA PLUS METER  TEST FOUR TIMES DAILY BEFORE MEALS AND EVERY NIGHT     cetirizine 10 MG tablet  Commonly known as: ZYRTEC  Take 1 tablet (10 mg total) by mouth daily as needed for Allergies (itching).     clobetasoL 0.05 % external solution  Commonly known as: TEMOVATE  Apply topically 2 (two) times daily. Prn scalp itch or rash     cyclobenzaprine 10 MG tablet  Commonly known as: FLEXERIL     diclofenac sodium 1 % Gel  Commonly known as: VOLTAREN  Apply 2 g topically 3 (three) times daily. Apply to painful joints     EPINEPHrine 0.3 mg/0.3 mL Atin  Commonly known as: EPIPEN  Inject 0.3 mLs (0.3 mg total) into the muscle once. for 1 dose     fluticasone furoate-vilanteroL 200-25 mcg/dose Dsdv diskus inhaler  Commonly known as: BREO ELLIPTA  Inhale 1 puff into the lungs once daily. Controller     fluticasone propionate 50 mcg/actuation nasal spray  Commonly known as: FLONASE  2 sprays (100 mcg total) by Each Nostril route 2 (two) times a day.     JARDIANCE 10 mg tablet  Generic drug: empagliflozin  Take 1 tablet (10 mg total) by mouth once daily.     * lancets Misc  Commonly known as: ACCU-CHEK SOFTCLIX LANCETS  1 Device by Misc.(Non-Drug; Combo Route) route 4 (four) times daily.     * lancets  Misc  Commonly known as: ACCU-CHEK FASTCLIX LANCET DRUM  1 Device by Misc.(Non-Drug; Combo Route) route 2 (two) times a day.     levocetirizine 5 MG tablet  Commonly known as: XYZAL  Take 1 tablet (5 mg total) by mouth every evening.     LIDOcaine 5 %  Commonly known as: LIDODERM  Place 1 patch onto the skin once daily. Remove & Discard patch within 12 hours or as directed by MD     LIDOcaine HCL 2% 2 % jelly  Commonly known as: XYLOCAINE  Apply topically as needed. Apply topically once nightly to affected part of foot/feet.     LINZESS 290 mcg Cap capsule  Generic drug: linaCLOtide  Take 290 mcg by mouth once daily.     MAGIC MOUTHWASH WITH LIDOCAINE (DR CONTRERAS)  Swish and swallow 10 mLs every 4 (four) hours as needed (mouth and throat pain).     methylPREDNISolone 4 mg tablet  Commonly known as: MEDROL DOSEPACK  use as directed     montelukast 10 mg tablet  Commonly known as: SINGULAIR  Take 1 tablet (10 mg total) by mouth every evening.     MOVANTIK 12.5 mg Tab  Generic drug: naloxegoL  Take 1 tablet by mouth once daily.     nicotine 21 mg/24 hr  Commonly known as: NICODERM CQ  Place 1 patch onto the skin once daily.     nystatin-triamcinolone ointment  Commonly known as: MYCOLOG  Apply topically 2 (two) times daily.     ondansetron 8 MG tablet  Commonly known as: ZOFRAN  Take 1 tablet (8 mg total) by mouth every 8 (eight) hours as needed for Nausea.     pregabalin 50 MG capsule  Commonly known as: LYRICA  Take 1 capsule (50 mg total) by mouth 3 (three) times daily.     QUEtiapine 25 MG Tab  Commonly known as: SEROQUEL  Take 1 tablet (25 mg total) by mouth once daily.     semaglutide 2 mg/dose (8 mg/3 mL) Pnij  Inject 2 mg into the skin every 7 days.     sulindac 200 MG Tab  Commonly known as: CLINORIL  Take 1 tablet (200 mg total) by mouth 2 (two) times daily.     topiramate 50 MG tablet  Commonly known as: TOPAMAX  Take 2 tablets (100 mg total) by mouth every evening.     triamcinolone acetonide 0.1% 0.1 %  cream  Commonly known as: KENALOG  Apply topically 2 (two) times daily. Prn focally for rash spots on forehead and legs.Stop using steroid topical when skin is smooth and non itchy.  Do not treat dark or red coloring.     venlafaxine 75 MG 24 hr capsule  Commonly known as: EFFEXOR-XR  Take 1 capsule (75 mg total) by mouth once daily. NO FURTHER REFILL WITHOUT APT         * This list has 4 medication(s) that are the same as other medications prescribed for you. Read the directions carefully, and ask your doctor or other care provider to review them with you.            ASK your doctor about these medications    azithromycin 250 MG tablet  Commonly known as: Z-RHODA  Take 2 tablets by mouth on day 1; Take 1 tablet by mouth on days 2-5  Ask about: Should I take this medication?            Indwelling Lines/Drains at time of discharge:   Lines/Drains/Airways     None                 Time spent on the discharge of patient: 45 minutes         Koko Quezada MD  Department of Hospital Medicine  [facility]

## 2022-10-18 ENCOUNTER — OFFICE VISIT (OUTPATIENT)
Dept: PODIATRY | Facility: CLINIC | Age: 52
End: 2022-10-18
Payer: MEDICARE

## 2022-10-18 VITALS
WEIGHT: 293 LBS | DIASTOLIC BLOOD PRESSURE: 100 MMHG | HEART RATE: 72 BPM | SYSTOLIC BLOOD PRESSURE: 148 MMHG | BODY MASS INDEX: 59.13 KG/M2

## 2022-10-18 DIAGNOSIS — E11.51 DIABETES MELLITUS WITH PERIPHERAL CIRCULATORY DISORDER: Primary | ICD-10-CM

## 2022-10-18 DIAGNOSIS — G57.52 TARSAL TUNNEL SYNDROME OF LEFT SIDE: ICD-10-CM

## 2022-10-18 DIAGNOSIS — L84 CORN OR CALLUS: ICD-10-CM

## 2022-10-18 PROCEDURE — 4010F ACE/ARB THERAPY RXD/TAKEN: CPT | Mod: CPTII,S$GLB,, | Performed by: PODIATRIST

## 2022-10-18 PROCEDURE — 99214 OFFICE O/P EST MOD 30 MIN: CPT | Mod: S$GLB,,, | Performed by: PODIATRIST

## 2022-10-18 PROCEDURE — 3066F NEPHROPATHY DOC TX: CPT | Mod: CPTII,S$GLB,, | Performed by: PODIATRIST

## 2022-10-18 PROCEDURE — 3061F PR NEG MICROALBUMINURIA RESULT DOCUMENTED/REVIEW: ICD-10-PCS | Mod: CPTII,S$GLB,, | Performed by: PODIATRIST

## 2022-10-18 PROCEDURE — 3044F PR MOST RECENT HEMOGLOBIN A1C LEVEL <7.0%: ICD-10-PCS | Mod: CPTII,S$GLB,, | Performed by: PODIATRIST

## 2022-10-18 PROCEDURE — 3080F PR MOST RECENT DIASTOLIC BLOOD PRESSURE >= 90 MM HG: ICD-10-PCS | Mod: CPTII,S$GLB,, | Performed by: PODIATRIST

## 2022-10-18 PROCEDURE — 99999 PR PBB SHADOW E&M-EST. PATIENT-LVL IV: CPT | Mod: PBBFAC,,, | Performed by: PODIATRIST

## 2022-10-18 PROCEDURE — 99999 PR PBB SHADOW E&M-EST. PATIENT-LVL IV: ICD-10-PCS | Mod: PBBFAC,,, | Performed by: PODIATRIST

## 2022-10-18 PROCEDURE — 3077F PR MOST RECENT SYSTOLIC BLOOD PRESSURE >= 140 MM HG: ICD-10-PCS | Mod: CPTII,S$GLB,, | Performed by: PODIATRIST

## 2022-10-18 PROCEDURE — 3044F HG A1C LEVEL LT 7.0%: CPT | Mod: CPTII,S$GLB,, | Performed by: PODIATRIST

## 2022-10-18 PROCEDURE — 4010F PR ACE/ARB THEARPY RXD/TAKEN: ICD-10-PCS | Mod: CPTII,S$GLB,, | Performed by: PODIATRIST

## 2022-10-18 PROCEDURE — 99214 PR OFFICE/OUTPT VISIT, EST, LEVL IV, 30-39 MIN: ICD-10-PCS | Mod: S$GLB,,, | Performed by: PODIATRIST

## 2022-10-18 PROCEDURE — 3061F NEG MICROALBUMINURIA REV: CPT | Mod: CPTII,S$GLB,, | Performed by: PODIATRIST

## 2022-10-18 PROCEDURE — 3080F DIAST BP >= 90 MM HG: CPT | Mod: CPTII,S$GLB,, | Performed by: PODIATRIST

## 2022-10-18 PROCEDURE — 3066F PR DOCUMENTATION OF TREATMENT FOR NEPHROPATHY: ICD-10-PCS | Mod: CPTII,S$GLB,, | Performed by: PODIATRIST

## 2022-10-18 PROCEDURE — 3077F SYST BP >= 140 MM HG: CPT | Mod: CPTII,S$GLB,, | Performed by: PODIATRIST

## 2022-10-18 RX ORDER — AMITRIPTYLINE HYDROCHLORIDE 25 MG/1
25 TABLET, FILM COATED ORAL NIGHTLY PRN
Qty: 30 TABLET | Refills: 0 | Status: SHIPPED | OUTPATIENT
Start: 2022-10-18 | End: 2022-12-21

## 2022-10-21 ENCOUNTER — OFFICE VISIT (OUTPATIENT)
Dept: FAMILY MEDICINE | Facility: CLINIC | Age: 52
End: 2022-10-21
Payer: MEDICARE

## 2022-10-21 VITALS
HEART RATE: 67 BPM | SYSTOLIC BLOOD PRESSURE: 120 MMHG | OXYGEN SATURATION: 96 % | DIASTOLIC BLOOD PRESSURE: 86 MMHG | HEIGHT: 64 IN | TEMPERATURE: 99 F | WEIGHT: 293 LBS | BODY MASS INDEX: 50.02 KG/M2

## 2022-10-21 DIAGNOSIS — J45.901 ASTHMA EXACERBATION IN COPD: Primary | ICD-10-CM

## 2022-10-21 DIAGNOSIS — M54.50 CHRONIC BILATERAL LOW BACK PAIN WITHOUT SCIATICA: ICD-10-CM

## 2022-10-21 DIAGNOSIS — Z74.09 IMPAIRED FUNCTIONAL MOBILITY, BALANCE, GAIT, AND ENDURANCE: ICD-10-CM

## 2022-10-21 DIAGNOSIS — I10 ESSENTIAL HYPERTENSION: Chronic | ICD-10-CM

## 2022-10-21 DIAGNOSIS — J45.40 MODERATE PERSISTENT ASTHMA WITHOUT COMPLICATION: ICD-10-CM

## 2022-10-21 DIAGNOSIS — N18.31 TYPE 2 DIABETES MELLITUS WITH STAGE 3A CHRONIC KIDNEY DISEASE, WITH LONG-TERM CURRENT USE OF INSULIN: ICD-10-CM

## 2022-10-21 DIAGNOSIS — N18.31 STAGE 3A CHRONIC KIDNEY DISEASE: Chronic | ICD-10-CM

## 2022-10-21 DIAGNOSIS — D63.8 ANEMIA OF CHRONIC DISEASE: Chronic | ICD-10-CM

## 2022-10-21 DIAGNOSIS — E78.2 MIXED HYPERLIPIDEMIA: Chronic | ICD-10-CM

## 2022-10-21 DIAGNOSIS — J44.1 ASTHMA EXACERBATION IN COPD: Primary | ICD-10-CM

## 2022-10-21 DIAGNOSIS — G89.29 CHRONIC BILATERAL LOW BACK PAIN WITHOUT SCIATICA: ICD-10-CM

## 2022-10-21 DIAGNOSIS — E66.01 MORBID OBESITY: ICD-10-CM

## 2022-10-21 DIAGNOSIS — F17.210 CIGARETTE NICOTINE DEPENDENCE WITHOUT COMPLICATION: ICD-10-CM

## 2022-10-21 DIAGNOSIS — Z79.4 TYPE 2 DIABETES MELLITUS WITH STAGE 3A CHRONIC KIDNEY DISEASE, WITH LONG-TERM CURRENT USE OF INSULIN: ICD-10-CM

## 2022-10-21 DIAGNOSIS — E11.51 DIABETES MELLITUS WITH PERIPHERAL CIRCULATORY DISORDER: ICD-10-CM

## 2022-10-21 DIAGNOSIS — E11.22 TYPE 2 DIABETES MELLITUS WITH STAGE 3A CHRONIC KIDNEY DISEASE, WITH LONG-TERM CURRENT USE OF INSULIN: ICD-10-CM

## 2022-10-21 DIAGNOSIS — Z78.9 DECREASED ACTIVITIES OF DAILY LIVING (ADL): ICD-10-CM

## 2022-10-21 PROBLEM — U07.1 COVID-19 VIRUS INFECTION: Status: RESOLVED | Noted: 2022-10-07 | Resolved: 2022-10-21

## 2022-10-21 PROBLEM — J10.1 INFLUENZA A: Status: RESOLVED | Noted: 2020-01-16 | Resolved: 2022-10-21

## 2022-10-21 PROCEDURE — 3079F DIAST BP 80-89 MM HG: CPT | Mod: CPTII,S$GLB,, | Performed by: NURSE PRACTITIONER

## 2022-10-21 PROCEDURE — 4010F ACE/ARB THERAPY RXD/TAKEN: CPT | Mod: CPTII,S$GLB,, | Performed by: NURSE PRACTITIONER

## 2022-10-21 PROCEDURE — 99999 PR PBB SHADOW E&M-EST. PATIENT-LVL V: CPT | Mod: PBBFAC,,, | Performed by: NURSE PRACTITIONER

## 2022-10-21 PROCEDURE — 3074F SYST BP LT 130 MM HG: CPT | Mod: CPTII,S$GLB,, | Performed by: NURSE PRACTITIONER

## 2022-10-21 PROCEDURE — 1159F MED LIST DOCD IN RCRD: CPT | Mod: CPTII,S$GLB,, | Performed by: NURSE PRACTITIONER

## 2022-10-21 PROCEDURE — 99214 PR OFFICE/OUTPT VISIT, EST, LEVL IV, 30-39 MIN: ICD-10-PCS | Mod: S$GLB,,, | Performed by: NURSE PRACTITIONER

## 2022-10-21 PROCEDURE — 1160F RVW MEDS BY RX/DR IN RCRD: CPT | Mod: CPTII,S$GLB,, | Performed by: NURSE PRACTITIONER

## 2022-10-21 PROCEDURE — 99499 UNLISTED E&M SERVICE: CPT | Mod: S$GLB,,, | Performed by: NURSE PRACTITIONER

## 2022-10-21 PROCEDURE — 99214 OFFICE O/P EST MOD 30 MIN: CPT | Mod: S$GLB,,, | Performed by: NURSE PRACTITIONER

## 2022-10-21 PROCEDURE — 99999 PR PBB SHADOW E&M-EST. PATIENT-LVL V: ICD-10-PCS | Mod: PBBFAC,,, | Performed by: NURSE PRACTITIONER

## 2022-10-21 PROCEDURE — 3066F PR DOCUMENTATION OF TREATMENT FOR NEPHROPATHY: ICD-10-PCS | Mod: CPTII,S$GLB,, | Performed by: NURSE PRACTITIONER

## 2022-10-21 PROCEDURE — 4010F PR ACE/ARB THEARPY RXD/TAKEN: ICD-10-PCS | Mod: CPTII,S$GLB,, | Performed by: NURSE PRACTITIONER

## 2022-10-21 PROCEDURE — 3061F PR NEG MICROALBUMINURIA RESULT DOCUMENTED/REVIEW: ICD-10-PCS | Mod: CPTII,S$GLB,, | Performed by: NURSE PRACTITIONER

## 2022-10-21 PROCEDURE — 1159F PR MEDICATION LIST DOCUMENTED IN MEDICAL RECORD: ICD-10-PCS | Mod: CPTII,S$GLB,, | Performed by: NURSE PRACTITIONER

## 2022-10-21 PROCEDURE — 3061F NEG MICROALBUMINURIA REV: CPT | Mod: CPTII,S$GLB,, | Performed by: NURSE PRACTITIONER

## 2022-10-21 PROCEDURE — 3044F PR MOST RECENT HEMOGLOBIN A1C LEVEL <7.0%: ICD-10-PCS | Mod: CPTII,S$GLB,, | Performed by: NURSE PRACTITIONER

## 2022-10-21 PROCEDURE — 3066F NEPHROPATHY DOC TX: CPT | Mod: CPTII,S$GLB,, | Performed by: NURSE PRACTITIONER

## 2022-10-21 PROCEDURE — 3074F PR MOST RECENT SYSTOLIC BLOOD PRESSURE < 130 MM HG: ICD-10-PCS | Mod: CPTII,S$GLB,, | Performed by: NURSE PRACTITIONER

## 2022-10-21 PROCEDURE — 1160F PR REVIEW ALL MEDS BY PRESCRIBER/CLIN PHARMACIST DOCUMENTED: ICD-10-PCS | Mod: CPTII,S$GLB,, | Performed by: NURSE PRACTITIONER

## 2022-10-21 PROCEDURE — 3044F HG A1C LEVEL LT 7.0%: CPT | Mod: CPTII,S$GLB,, | Performed by: NURSE PRACTITIONER

## 2022-10-21 PROCEDURE — 3079F PR MOST RECENT DIASTOLIC BLOOD PRESSURE 80-89 MM HG: ICD-10-PCS | Mod: CPTII,S$GLB,, | Performed by: NURSE PRACTITIONER

## 2022-10-21 RX ORDER — LINACLOTIDE 145 UG/1
145 CAPSULE, GELATIN COATED ORAL DAILY
COMMUNITY
Start: 2022-10-20 | End: 2022-12-23 | Stop reason: SDUPTHER

## 2022-10-21 NOTE — PROGRESS NOTES
History of Present Illness   Yanni Kaiser is a 52 y.o. woman with medical history as listed below who presents today for one week follow-up, asthma flare. She reports taking steroids and antibiotics as prescribed. She has reduced her nebulizer use to BID. She reports resolution of wheezing and shortness of breath. Her fatigue has improved greatly. She continues to have a cough and some post nasal drip, but this is improving. She reports cutting back on smoking to 0.5 ppd and is interested in complete cessation. She has no additional complaints and is otherwise healthy on today's visit.    Past Medical History:   Diagnosis Date    Allergy     Anemia     Anxiety     Asthma     Back pain     Chronic bronchitis     Chronic obstructive pulmonary disease, unspecified COPD type 2022    Cigarette smoker     DDD (degenerative disc disease), lumbar 2020    Depression     Diabetes mellitus with peripheral circulatory disorder 2022    Diabetes with neurologic complications     DUB (dysfunctional uterine bleeding) 10/16/2018    GERD (gastroesophageal reflux disease)     High cholesterol     Hyperprolactinemia     Hypertension     Influenza A 2020    Neuromuscular disorder     Obese body habitus     Obesity     Pseudotumor cerebri     Renal manifestation of secondary diabetes mellitus     Respiratory failure     Seizures     Simple endometrial hyperplasia 2018    Sleep apnea     Smoker     Tobacco dependence     Urinary incontinence        Past Surgical History:   Procedure Laterality Date    ANTROSTOMY OF MAXILLARY SINUS AT INFERIOR NASAL MEATUS  10/22/2021    Procedure: MAXILLARY ANTROSTOMY, AT INFERIOR NASAL MEATUS;  Surgeon: John Prado III, MD;  Location: [facility];  Service: ENT;;    BREAST CYST ASPIRATION       SECTION      X 1    CHOLECYSTECTOMY      COLONOSCOPY      COLONOSCOPY N/A 2019    Procedure: COLONOSCOPY;  Surgeon: Jagruti Sweeney MD;  [facility]   Service: Endoscopy;  Laterality: N/A;  BMI is 63/gastroparesis/3 days full liquid diet 1 day clear liquid see telephone encounter by Ciara Pinto [facility]    EPIDURAL STEROID INJECTION N/A 09/11/2020    Procedure: INJECTION, STEROID, EPIDURAL, L5-S1 IL;  Surgeon: Lawrence Marin MD;  Location: [facility] MGT;  Service: Pain Management;  Laterality: N/A;    ESOPHAGEAL MANOMETRY WITH MEASUREMENT OF IMPEDANCE N/A 11/05/2019    Procedure: MANOMETRY-ESOPHAGEAL-WITH IMPEDANCE;  Surgeon: Jagruti Sweeney MD;  Location: [facility] (2ND FLR);  Service: Endoscopy;  Laterality: N/A;  Hold Narcotics x 1 days   Hold TCA x 1 days  Propofol only. No fentanyl or benzodiazepine during sedation. If additional sedation needed, discuss with Dr. Sweeney.  10/29 - pt confirmed appt    ESOPHAGOGASTRODUODENOSCOPY N/A 01/14/2019    Procedure: EGD (ESOPHAGOGASTRODUODENOSCOPY);  Surgeon: Jagruti Sweeney MD;  Location: [facility] ([address] [place] [address]);  Service: Endoscopy;  Laterality: N/A;  BMI is 63/gastroparesis/3 days full liquid diet 1 day clear liquid see telephone encounter by Ciara Pinto [facility]    ESOPHAGOGASTRODUODENOSCOPY N/A 11/05/2019    Procedure: ESOPHAGOGASTRODUODENOSCOPY (EGD);  Surgeon: Jagruti Sweeney MD;  Location: [facility] ([address] [place] [address]);  Service: Endoscopy;  Laterality: N/A;  EGD with EndoFlip /+/- Botox  2nd floor for gastroparesis/BMI 63 **367lbs**  Bariatric Stretcher needed  Manometry probe to be placed endoscopically during EGD procedure  Due to change in protocol, Full liquid diet for 3 days/ 1 day of clear liquids  Hi    ESOPHAGOGASTRODUODENOSCOPY N/A 12/14/2020    Procedure: ESOPHAGOGASTRODUODENOSCOPY (EGD) with BRAVO;  Surgeon: Jagruti Sweeney MD;  Location: [facility] (2ND FLR);  Service: Endoscopy;  Laterality: N/A;  with Dilation  2nd floor due to BMI 56.20 (327 lbs) and gastroparesis  3 days full liquid diet and 1 day clears    ESOPHAGOGASTRODUODENOSCOPY N/A 04/15/2021    Procedure: ESOPHAGOGASTRODUODENOSCOPY (EGD);  Surgeon:  Jagruti Sweeney MD;  Location: [facility] ENDO (2ND FLR);  Service: Endoscopy;  Laterality: N/A;  Endoflip  2nd floor-gastroparesis, BMI 57.18, bariatric stretcher  full liquid diet x3 days, clear liquid diet x1 day prior to procedure  propofol only  covid test 4/12 primary care, instructions emailed-[facility] [place]    FOOT FRACTURE SURGERY Left     FUNCTIONAL ENDOSCOPIC SINUS SURGERY (FESS) USING COMPUTER-ASSISTED NAVIGATION Bilateral 10/22/2021    Procedure: FESS, USING COMPUTER-ASSISTED NAVIGATION;  Surgeon: John Prado III, MD;  Location: [facility];  Service: ENT;  Laterality: Bilateral;  FOLLOW DR PRADO ANESTHESIA PROTOCAL / pt will stay 23 hour post surgery for SHARATH observation    HYSTEROSCOPY WITH DILATION AND CURETTAGE OF UTERUS N/A 10/16/2018    KJB    INJECTION OF ANESTHETIC AGENT AROUND NERVE Bilateral 10/23/2020    Procedure: BLOCK, NERVE  bilateral L3,4,5 MBB;  Surgeon: Lawrence Marin MD;  Location: [facility] PAIN MGT;  Service: Pain Management;  Laterality: Bilateral;  bilateral L3,4,5 MBB   consent needed    INJECTION OF ANESTHETIC AGENT AROUND NERVE Bilateral 02/18/2022    Procedure: BLOCK, NERVE, L3-L4-L5 MEDIAL BRANCH;  Surgeon: Lawrence Marin MD;  Location: [facility] PAIN MGT;  Service: Pain Management;  Laterality: Bilateral;    PLANTAR FASCIOTOMY Left 11/18/2019    Procedure: FASCIOTOMY, PLANTAR;  Surgeon: Valentino Orourke DPM;  Location: [facility] 2ND FLR;  Service: Podiatry;  Laterality: Left;    RETINAL LASER PROCEDURE Left     SINUS SURGERY      SPHENOIDECTOMY Bilateral 10/22/2021    Procedure: SPHENOIDECTOMY;  Surgeon: John Prado III, MD;  Location: [facility];  Service: ENT;  Laterality: Bilateral;    TRANSFORAMINAL EPIDURAL INJECTION OF STEROID Bilateral 06/24/2020    Procedure: INJECTION, STEROID, EPIDURAL, TRANSFORAMINAL APPROACH;  Surgeon: Lawrence Marin MD;  Location: [facility] PAIN MGT;  Service: Pain Management;  Laterality: Bilateral;    TRANSFORAMINAL EPIDURAL INJECTION OF STEROID Bilateral 01/28/2022  "   Procedure: INJECTION, STEROID, EPIDURAL, TRANSFORAMINAL APPROACH  Bilateral TFESI L4/L5      NEEDS CONSENT;  Surgeon: Lawrence Marin MD;  Location: [facility];  Service: Pain Management;  Laterality: Bilateral;    UPPER GASTROINTESTINAL ENDOSCOPY         Social History     Socioeconomic History    Marital status:     Number of children: 4   Occupational History    Occupation: disable   Tobacco Use    Smoking status: Every Day     Packs/day: 0.25     Years: 27.00     Pack years: 6.75     Types: Cigarettes     Start date: 1/1/1992     Passive exposure: Current    Smokeless tobacco: Never    Tobacco comments:     "1/2 pack per every 2 days or so"           10/21/22 patient says its less than half a pack now. Has been cutting back.   Substance and Sexual Activity    Alcohol use: Yes     Alcohol/week: 1.0 standard drink     Types: 1 Shots of liquor per week     Comment: occasionally    Drug use: No    Sexual activity: Yes     Partners: Male     Birth control/protection: None     Comment:      Social Determinants of Health     Financial Resource Strain: High Risk    Difficulty of Paying Living Expenses: Very hard   Food Insecurity: Food Insecurity Present    Worried About Running Out of Food in the Last Year: Often true    Ran Out of Food in the Last Year: Sometimes true   Transportation Needs: Unmet Transportation Needs    Lack of Transportation (Medical): Yes    Lack of Transportation (Non-Medical): Yes   Physical Activity: Inactive    Days of Exercise per Week: 0 days    Minutes of Exercise per Session: 0 min   Stress: Stress Concern Present    Feeling of Stress : To some extent   Social Connections: Unknown    Frequency of Communication with Friends and Family: Three times a week    Frequency of Social Gatherings with Friends and Family: Once a week    Active Member of Clubs or Organizations: Yes    Attends Club or Organization Meetings: More than 4 times per year    Marital Status:  "   Housing Stability: High Risk    Unable to Pay for Housing in the Last Year: Yes    Number of Places Lived in the Last Year: 1    Unstable Housing in the Last Year: No       Family History   Problem Relation Age of Onset    Hypertension Mother     Diabetes Mother     Colon cancer Mother 50    Colon polyps Mother     Cataracts Mother     Heart disease Father     COPD Father     Heart attack Father     Hypertension Father     Ulcers Father     Cataracts Father     Glaucoma Father     No Known Problems Brother     Diabetes Daughter         prediabetes    Diabetes Daughter         prediabetic    Hypertension Daughter     Obesity Daughter     No Known Problems Daughter     No Known Problems Son     No Known Problems Maternal Aunt     No Known Problems Maternal Uncle     No Known Problems Paternal Aunt     No Known Problems Paternal Uncle     Cancer Maternal Grandmother     Breast cancer Maternal Grandmother     Colon cancer Maternal Grandmother     Colon polyps Maternal Grandmother     No Known Problems Maternal Grandfather     No Known Problems Paternal Grandmother     No Known Problems Paternal Grandfather     Celiac disease Neg Hx     Cirrhosis Neg Hx     Crohn's disease Neg Hx     Cystic fibrosis Neg Hx     Esophageal cancer Neg Hx     Hemochromatosis Neg Hx     Inflammatory bowel disease Neg Hx     Irritable bowel syndrome Neg Hx     Liver cancer Neg Hx     Liver disease Neg Hx     Rectal cancer Neg Hx     Stomach cancer Neg Hx     Ulcerative colitis Neg Hx     Jonnie's disease Neg Hx     Tuberculosis Neg Hx     Lymphoma Neg Hx     Scleroderma Neg Hx     Rheum arthritis Neg Hx     Melanoma Neg Hx     Multiple sclerosis Neg Hx     Psoriasis Neg Hx     Lupus Neg Hx     Skin cancer Neg Hx     Amblyopia Neg Hx     Blindness Neg Hx     Macular degeneration Neg Hx     Retinal detachment Neg Hx     Strabismus Neg Hx     Stroke Neg Hx     Thyroid disease Neg Hx     Allergic rhinitis Neg Hx     Allergies Neg Hx      "Angioedema Neg Hx     Asthma Neg Hx     Eczema Neg Hx     Immunodeficiency Neg Hx     Rhinitis Neg Hx     Urticaria Neg Hx     Atopy Neg Hx        Review of Systems  Review of Systems   Constitutional:  Negative for chills, fever and malaise/fatigue.   HENT:  Positive for congestion. Negative for ear pain, sinus pain and sore throat.    Respiratory:  Positive for cough. Negative for hemoptysis, sputum production, shortness of breath and wheezing.    Cardiovascular:  Negative for chest pain, palpitations and orthopnea.     A complete review of systems was otherwise negative.    Physical Exam  /86   Pulse 67   Temp 98.5 °F (36.9 °C) (Oral)   Ht 5' 4" (1.626 m)   Wt (!) 155.3 kg (342 lb 4.2 oz)   LMP 08/17/2019   SpO2 96%   BMI 58.75 kg/m²   General appearance: alert, appears stated age, cooperative, and no distress  Lungs: clear to auscultation bilaterally  Heart: regular rate and rhythm, S1, S2 normal, no murmur, click, rub or gallop  Extremities: extremities normal, atraumatic, no cyanosis or edema  Pulses: 2+ and symmetric  Skin: Skin color, texture, turgor normal. No rashes or lesions  Neurologic: Grossly normal    Assessment/Plan  Asthma exacerbation in COPD  Overall, greatly improved since last visit.  Continue steroids and antibiotics as prescribed.  Continue nebulizer PRN.  RTC PRN.    Moderate persistent asthma without complication  As above.    Essential hypertension  The current medical regimen is effective;  continue present plan and medications.    Cigarette nicotine dependence without complication  Down to 0.5 ppd.  Congratulated her on this and encouraged her to continue goal of cessation.    Mixed hyperlipidemia  The current medical regimen is effective;  continue present plan and medications.    Stage 3a chronic kidney disease  The current medical regimen is effective;  continue present plan and medications.    Anemia of chronic disease  The current medical regimen is effective;  continue " present plan and medications.    Type 2 diabetes mellitus with stage 3a chronic kidney disease, with long-term current use of insulin  The current medical regimen is effective;  continue present plan and medications.  Labs and follow-up in 2 months.  -     Hemoglobin A1C; Future; Expected date: 12/19/2022  -     Basic Metabolic Panel; Future; Expected date: 12/19/2022    Diabetes mellitus with peripheral circulatory disorder  The current medical regimen is effective;  continue present plan and medications.  Labs and follow-up in 2 months.    Morbid obesity  The patient is asked to make an attempt to improve diet and exercise patterns to aid in medical management of this problem.    Chronic bilateral low back pain without sciatica  The current medical regimen is effective;  continue present plan and medications.    Impaired functional mobility, balance, gait, and endurance  The current medical regimen is effective;  continue present plan and medications.    Decreased activities of daily living (ADL)  The current medical regimen is effective;  continue present plan and medications.    Patient has verbalized understanding and is in agreement with plan of care.    Follow up in about 2 months (around 12/21/2022) for follow-up with labs prior.

## 2022-10-23 NOTE — PROGRESS NOTES
Subjective:      Patient ID: Yanni Kaiser is a 52 y.o. female.    Chief Complaint: Diabetic Foot Exam (Carlyle Gordillo MD/PCP - General 09/28/2022/) and Diabetes Mellitus  She has generalized foot pain in multiple joint pain she a is seeing physical medication/pain management.  Follow-up for foot pain and diabetes foot exam.     Review of Systems   Constitutional: Negative for chills, decreased appetite, fever, malaise/fatigue and night sweats.   HENT:  Negative for congestion, ear discharge, hearing loss, nosebleeds and tinnitus.    Eyes:  Negative for double vision, pain and visual disturbance.   Cardiovascular:  Negative for chest pain, claudication, cyanosis and palpitations.   Respiratory:  Negative for cough, hemoptysis, shortness of breath and wheezing.    Endocrine: Negative for cold intolerance and heat intolerance.   Hematologic/Lymphatic: Negative for adenopathy and bleeding problem.   Skin:  Positive for color change, dry skin, nail changes and unusual hair distribution. Negative for flushing and rash.   Musculoskeletal:  Positive for arthritis and stiffness. Negative for joint pain and myalgias.        Pain to both feet   Gastrointestinal:  Negative for abdominal pain, dysphagia, nausea and vomiting.   Genitourinary:  Negative for dysuria, flank pain, hematuria and pelvic pain.   Neurological:  Positive for disturbances in coordination. Negative for loss of balance, numbness, paresthesias and sensory change.   Psychiatric/Behavioral:  Negative for altered mental status, hallucinations and suicidal ideas. The patient does not have insomnia.    Allergic/Immunologic: Negative for environmental allergies and persistent infections.         Objective:      Physical Exam  Vitals reviewed.   Constitutional:       Appearance: She is well-developed.   HENT:      Head: Normocephalic and atraumatic.   Cardiovascular:      Pulses:           Dorsalis pedis pulses are 2+ on the right side and 2+ on the left  side.        Posterior tibial pulses are 2+ on the right side and 2+ on the left side.   Pulmonary:      Effort: Pulmonary effort is normal.   Musculoskeletal:         General: Normal range of motion.      Right knee: No swelling or ecchymosis.      Left knee: No swelling or ecchymosis.      Right lower leg: No swelling, deformity, tenderness or bony tenderness. No edema.      Left lower leg: No swelling or tenderness. No edema.      Right ankle: No swelling or ecchymosis. No lateral malleolus or medial malleolus tenderness. Normal range of motion. Normal pulse.      Right Achilles Tendon: No defects. Copeland's test negative.      Left ankle: No swelling or ecchymosis. No lateral malleolus or medial malleolus tenderness. Normal range of motion. Normal pulse.      Left Achilles Tendon: Copeland's test negative.      Right foot: Normal range of motion. No deformity.      Left foot: Normal range of motion. No deformity.      Comments: Tenderness noted to the plantar left great toe joint underlying the sesamoid complex.   Feet:      Right foot:      Protective Sensation: 5 sites tested.  5 sites sensed.      Skin integrity: Callus and dry skin present.      Left foot:      Protective Sensation: 5 sites tested.  5 sites sensed.      Skin integrity: Callus and dry skin present.   Skin:     General: Skin is warm and dry.      Capillary Refill: Capillary refill takes more than 3 seconds.      Coloration: Skin is pale.      Findings: No abrasion, bruising, burn or ecchymosis.      Comments: No open lesions noted to b/L lower extremities.     Examination of the skin reveals no evidence of significant rashes, open lesions, suspicious appearing nevi or other concerning lesions.      Neurological:      Mental Status: She is alert and oriented to person, place, and time.      Sensory: Sensory deficit present.      Motor: Atrophy present.      Deep Tendon Reflexes: Reflexes abnormal.      Reflex Scores:       Patellar reflexes  are 1+ on the right side and 1+ on the left side.       Achilles reflexes are 1+ on the right side and 1+ on the left side.     Comments: Tenderness in Tinel sign noted to the left deep peroneal and tarsal tunnel region.  Positive Tinel sign and provocation sign noted.  Light touch intact.  MMT 5/5 DF, PF, Inv, Ev  Normal muscle tone.  No signs of atrophy.  No tremor or spasticity.     Psychiatric:         Attention and Perception: She is attentive.         Speech: Speech normal.         Behavior: Behavior normal.             Assessment:       Encounter Diagnoses   Name Primary?    Diabetes mellitus with peripheral circulatory disorder Yes    Corn or callus     Tarsal tunnel syndrome of left side          Plan:       Yanni was seen today for diabetic foot exam and diabetes mellitus.    Diagnoses and all orders for this visit:    Diabetes mellitus with peripheral circulatory disorder  -     amitriptyline (ELAVIL) 25 MG tablet; Take 1 tablet (25 mg total) by mouth nightly as needed for Insomnia.  -     ORTHOTIC DEVICE (DME)    Corn or callus  -     amitriptyline (ELAVIL) 25 MG tablet; Take 1 tablet (25 mg total) by mouth nightly as needed for Insomnia.  -     ORTHOTIC DEVICE (DME)    Tarsal tunnel syndrome of left side  -     amitriptyline (ELAVIL) 25 MG tablet; Take 1 tablet (25 mg total) by mouth nightly as needed for Insomnia.  -     ORTHOTIC DEVICE (DME)    I counseled the patient on her conditions, their implications and medical management.    I recommended patient be fitted for orthoses.  I explained that orthoses may improve function of the foot, reduce pain, decrease pronation, increase efficiency of muscle function of the foot and ankle and prevent surgery.  Alternative forms of biomechanical control of the foot and ankle were discussed with the patient.          The nature of the condition, options for management, as well as potential risks and complications were discussed in detail with patient. Patient  was amenable to my recommendations and left my office fully informed and will follow up as instructed or sooner if necessary.      Discussed options for peripheral neuropathy/nerve entrapment syndrome including nerve block therapy, surgical nerve entrapment decompression procedures, and various vitamins and supplementation available shown to improve nerve function.    Shoe inspection. Diabetic Foot Education. Patient reminded of the importance of good nutrition and blood sugar control to help prevent podiatric complications of diabetes. Patient instructed on proper foot hygeine. We discussed wearing proper shoe gear, daily foot inspections and Diabetic foot education in detail.    Return to clinic in 3-6 months or sooner if problems arise     Follow-up in 3 months.

## 2022-10-31 ENCOUNTER — OUTPATIENT CASE MANAGEMENT (OUTPATIENT)
Dept: ADMINISTRATIVE | Facility: OTHER | Age: 52
End: 2022-10-31
Payer: MEDICARE

## 2022-11-02 ENCOUNTER — OFFICE VISIT (OUTPATIENT)
Dept: ALLERGY | Facility: CLINIC | Age: 52
End: 2022-11-02
Payer: MEDICARE

## 2022-11-02 VITALS — BODY MASS INDEX: 50.02 KG/M2 | HEIGHT: 64 IN | HEART RATE: 93 BPM | OXYGEN SATURATION: 98 % | WEIGHT: 293 LBS

## 2022-11-02 DIAGNOSIS — J30.9 ALLERGIC RHINITIS, UNSPECIFIED SEASONALITY, UNSPECIFIED TRIGGER: Primary | ICD-10-CM

## 2022-11-02 DIAGNOSIS — J45.909 ASTHMA, UNSPECIFIED ASTHMA SEVERITY, UNSPECIFIED WHETHER COMPLICATED, UNSPECIFIED WHETHER PERSISTENT: ICD-10-CM

## 2022-11-02 PROCEDURE — 99999 PR PBB SHADOW E&M-EST. PATIENT-LVL IV: CPT | Mod: PBBFAC,GC,, | Performed by: STUDENT IN AN ORGANIZED HEALTH CARE EDUCATION/TRAINING PROGRAM

## 2022-11-02 PROCEDURE — 3061F PR NEG MICROALBUMINURIA RESULT DOCUMENTED/REVIEW: ICD-10-PCS | Mod: CPTII,GC,S$GLB, | Performed by: STUDENT IN AN ORGANIZED HEALTH CARE EDUCATION/TRAINING PROGRAM

## 2022-11-02 PROCEDURE — 99499 UNLISTED E&M SERVICE: CPT | Mod: HCNC,S$GLB,, | Performed by: STUDENT IN AN ORGANIZED HEALTH CARE EDUCATION/TRAINING PROGRAM

## 2022-11-02 PROCEDURE — 99499 RISK ADDL DX/OHS AUDIT: ICD-10-PCS | Mod: HCNC,S$GLB,, | Performed by: STUDENT IN AN ORGANIZED HEALTH CARE EDUCATION/TRAINING PROGRAM

## 2022-11-02 PROCEDURE — 4010F PR ACE/ARB THEARPY RXD/TAKEN: ICD-10-PCS | Mod: CPTII,GC,S$GLB, | Performed by: STUDENT IN AN ORGANIZED HEALTH CARE EDUCATION/TRAINING PROGRAM

## 2022-11-02 PROCEDURE — 1159F MED LIST DOCD IN RCRD: CPT | Mod: CPTII,GC,S$GLB, | Performed by: STUDENT IN AN ORGANIZED HEALTH CARE EDUCATION/TRAINING PROGRAM

## 2022-11-02 PROCEDURE — 3066F PR DOCUMENTATION OF TREATMENT FOR NEPHROPATHY: ICD-10-PCS | Mod: CPTII,GC,S$GLB, | Performed by: STUDENT IN AN ORGANIZED HEALTH CARE EDUCATION/TRAINING PROGRAM

## 2022-11-02 PROCEDURE — 3061F NEG MICROALBUMINURIA REV: CPT | Mod: CPTII,GC,S$GLB, | Performed by: STUDENT IN AN ORGANIZED HEALTH CARE EDUCATION/TRAINING PROGRAM

## 2022-11-02 PROCEDURE — 3008F PR BODY MASS INDEX (BMI) DOCUMENTED: ICD-10-PCS | Mod: CPTII,GC,S$GLB, | Performed by: STUDENT IN AN ORGANIZED HEALTH CARE EDUCATION/TRAINING PROGRAM

## 2022-11-02 PROCEDURE — 3044F HG A1C LEVEL LT 7.0%: CPT | Mod: CPTII,GC,S$GLB, | Performed by: STUDENT IN AN ORGANIZED HEALTH CARE EDUCATION/TRAINING PROGRAM

## 2022-11-02 PROCEDURE — 4010F ACE/ARB THERAPY RXD/TAKEN: CPT | Mod: CPTII,GC,S$GLB, | Performed by: STUDENT IN AN ORGANIZED HEALTH CARE EDUCATION/TRAINING PROGRAM

## 2022-11-02 PROCEDURE — 99214 PR OFFICE/OUTPT VISIT, EST, LEVL IV, 30-39 MIN: ICD-10-PCS | Mod: GC,S$GLB,, | Performed by: STUDENT IN AN ORGANIZED HEALTH CARE EDUCATION/TRAINING PROGRAM

## 2022-11-02 PROCEDURE — 3044F PR MOST RECENT HEMOGLOBIN A1C LEVEL <7.0%: ICD-10-PCS | Mod: CPTII,GC,S$GLB, | Performed by: STUDENT IN AN ORGANIZED HEALTH CARE EDUCATION/TRAINING PROGRAM

## 2022-11-02 PROCEDURE — 1159F PR MEDICATION LIST DOCUMENTED IN MEDICAL RECORD: ICD-10-PCS | Mod: CPTII,GC,S$GLB, | Performed by: STUDENT IN AN ORGANIZED HEALTH CARE EDUCATION/TRAINING PROGRAM

## 2022-11-02 PROCEDURE — 3008F BODY MASS INDEX DOCD: CPT | Mod: CPTII,GC,S$GLB, | Performed by: STUDENT IN AN ORGANIZED HEALTH CARE EDUCATION/TRAINING PROGRAM

## 2022-11-02 PROCEDURE — 99999 PR PBB SHADOW E&M-EST. PATIENT-LVL IV: ICD-10-PCS | Mod: PBBFAC,GC,, | Performed by: STUDENT IN AN ORGANIZED HEALTH CARE EDUCATION/TRAINING PROGRAM

## 2022-11-02 PROCEDURE — 3066F NEPHROPATHY DOC TX: CPT | Mod: CPTII,GC,S$GLB, | Performed by: STUDENT IN AN ORGANIZED HEALTH CARE EDUCATION/TRAINING PROGRAM

## 2022-11-02 PROCEDURE — 99214 OFFICE O/P EST MOD 30 MIN: CPT | Mod: GC,S$GLB,, | Performed by: STUDENT IN AN ORGANIZED HEALTH CARE EDUCATION/TRAINING PROGRAM

## 2022-11-02 RX ORDER — IBUPROFEN 600 MG/1
600 TABLET ORAL EVERY 8 HOURS PRN
COMMUNITY
Start: 2022-08-08 | End: 2022-11-02 | Stop reason: ALTCHOICE

## 2022-11-02 RX ORDER — FLUTICASONE PROPIONATE 50 MCG
2 SPRAY, SUSPENSION (ML) NASAL 2 TIMES DAILY
Qty: 32 G | Refills: 11 | Status: SHIPPED | OUTPATIENT
Start: 2022-11-02

## 2022-11-02 RX ORDER — IPRATROPIUM BROMIDE AND ALBUTEROL SULFATE 2.5; .5 MG/3ML; MG/3ML
3 SOLUTION RESPIRATORY (INHALATION) EVERY 6 HOURS PRN
Qty: 75 ML | Refills: 2 | Status: SHIPPED | OUTPATIENT
Start: 2022-11-02 | End: 2023-11-02

## 2022-11-02 RX ORDER — MONTELUKAST SODIUM 10 MG/1
10 TABLET ORAL NIGHTLY
Qty: 90 TABLET | Refills: 3 | Status: SHIPPED | OUTPATIENT
Start: 2022-11-02 | End: 2022-12-23 | Stop reason: SDUPTHER

## 2022-11-02 RX ORDER — ALBUTEROL SULFATE 90 UG/1
2 AEROSOL, METERED RESPIRATORY (INHALATION) EVERY 6 HOURS PRN
Qty: 54 G | Refills: 6 | Status: SHIPPED | OUTPATIENT
Start: 2022-11-02

## 2022-11-02 RX ORDER — CETIRIZINE HYDROCHLORIDE 10 MG/1
10 TABLET ORAL DAILY PRN
Qty: 30 TABLET | Refills: 11 | Status: SHIPPED | OUTPATIENT
Start: 2022-11-02 | End: 2022-12-23 | Stop reason: SDUPTHER

## 2022-11-02 RX ORDER — AZELASTINE 1 MG/ML
2 SPRAY, METERED NASAL 2 TIMES DAILY
Qty: 60 ML | Refills: 11 | Status: SHIPPED | OUTPATIENT
Start: 2022-11-02 | End: 2023-11-02

## 2022-11-02 NOTE — PROGRESS NOTES
ALLERGY & IMMUNOLOGY CLINIC - FOLLOW UP     HISTORY OF PRESENT ILLNESS     Patient ID: Yanni Kaiser is a 52 y.o. female    CC: follow up    HPI: Ms. Kaiser is a 51 year old female with a history of chronic rhinosinusitus and asthma who presents to clinic today for follow up. She was last seen in clinic in 2022.     AR: She is continues to use flonase 2 SEN BID and azelastine 1 SEN daily. Symptoms worsened with recent viral illnesses.     Asthma: Since last visit was hospitalized twice, first time (22) she was found to be positive for COVID-19 and then again was hospitalized on 10/6/22 for treatment of influenza A. She was discharged on 10/15/22 but continued to feel bad thus presented to PCP where she received steroid injection with improvement in symptoms about 3-4 days later. Since then, she is using breo 2 puffs twice per day and duonebs twice daily. She has not required albuterol MDI in one week. She is also taking montelukast 10 mg daily. She ran out of cetirizine.        REVIEW OF SYSTEMS     Balance of review of systems negative except as mentioned above     MEDICAL HISTORY     MedHx: active problems reviewed  SurgHx:   Past Surgical History:   Procedure Laterality Date    ANTROSTOMY OF MAXILLARY SINUS AT INFERIOR NASAL MEATUS  10/22/2021    Procedure: MAXILLARY ANTROSTOMY, AT INFERIOR NASAL MEATUS;  Surgeon: John Prado III, MD;  Location: [facility] OR;  Service: ENT;;    BREAST CYST ASPIRATION       SECTION      X 1    CHOLECYSTECTOMY      COLONOSCOPY      COLONOSCOPY N/A 2019    Procedure: COLONOSCOPY;  Surgeon: Jagruti Sweeney MD;  Location: [facility] ([address] [place] [address]);  Service: Endoscopy;  Laterality: N/A;  BMI is 63/gastroparesis/3 days full liquid diet 1 day clear liquid see telephone encounter by Ciara Brown     EPIDURAL STEROID INJECTION N/A 2020    Procedure: INJECTION, STEROID, EPIDURAL, L5-S1 IL;  Surgeon: Lawrence Marin MD;  [facility];  Service:  Pain Management;  Laterality: N/A;    ESOPHAGEAL MANOMETRY WITH MEASUREMENT OF IMPEDANCE N/A 11/05/2019    Procedure: MANOMETRY-ESOPHAGEAL-WITH IMPEDANCE;  Surgeon: Jagruti Sweeney MD;  Location: [facility] (2ND FLR);  Service: Endoscopy;  Laterality: N/A;  Hold Narcotics x 1 days   Hold TCA x 1 days  Propofol only. No fentanyl or benzodiazepine during sedation. If additional sedation needed, discuss with Dr. Sweeney.  10/29 - pt confirmed appt    ESOPHAGOGASTRODUODENOSCOPY N/A 01/14/2019    Procedure: EGD (ESOPHAGOGASTRODUODENOSCOPY);  Surgeon: Jagruti Sweeney MD;  Location: [facility] (2ND FLR);  Service: Endoscopy;  Laterality: N/A;  BMI is 63/gastroparesis/3 days full liquid diet 1 day clear liquid see telephone encounter by Ciara limon    ESOPHAGOGASTRODUODENOSCOPY N/A 11/05/2019    Procedure: ESOPHAGOGASTRODUODENOSCOPY (EGD);  Surgeon: Jagruti Sweeney MD;  Location: [facility] (2ND FLR);  Service: Endoscopy;  Laterality: N/A;  EGD with EndoFlip /+/- Botox  2nd floor for gastroparesis/BMI 63 **367lbs**  Bariatric Stretcher needed  Manometry probe to be placed endoscopically during EGD procedure  Due to change in protocol, Full liquid diet for 3 days/ 1 day of clear liquids  Hi    ESOPHAGOGASTRODUODENOSCOPY N/A 12/14/2020    Procedure: ESOPHAGOGASTRODUODENOSCOPY (EGD) with BRAVO;  Surgeon: Jagruti Sweeney MD;  Location: [facility] (2ND FLR);  Service: Endoscopy;  Laterality: N/A;  with Dilation  2nd floor due to BMI 56.20 (327 lbs) and gastroparesis  3 days full liquid diet and 1 day clears    ESOPHAGOGASTRODUODENOSCOPY N/A 04/15/2021    Procedure: ESOPHAGOGASTRODUODENOSCOPY (EGD);  Surgeon: Jagruti Sweeney MD;  Location: [facility] (2ND FLR);  Service: Endoscopy;  Laterality: N/A;  Endoflip  2nd floor-gastroparesis, BMI 57.18, bariatric stretcher  full liquid diet x3 days, clear liquid diet x1 day prior to procedure  propofol only  covid test 4/12 primary care, instructions emailed-KPvt    FOOT FRACTURE  SURGERY Left     FUNCTIONAL ENDOSCOPIC SINUS SURGERY (FESS) USING COMPUTER-ASSISTED NAVIGATION Bilateral 10/22/2021    Procedure: FESS, USING COMPUTER-ASSISTED NAVIGATION;  Surgeon: John Prado III, MD;  Location: [facility];  Service: ENT;  Laterality: Bilateral;  FOLLOW DR PRADO ANESTHESIA PROTOCAL / pt will stay 23 hour post surgery for SHARATH observation    HYSTEROSCOPY WITH DILATION AND CURETTAGE OF UTERUS N/A 10/16/2018    KJB    INJECTION OF ANESTHETIC AGENT AROUND NERVE Bilateral 10/23/2020    Procedure: BLOCK, NERVE  bilateral L3,4,5 MBB;  Surgeon: Lawrence Marin MD;  Location: [facility];  Service: Pain Management;  Laterality: Bilateral;  bilateral L3,4,5 MBB   consent needed    INJECTION OF ANESTHETIC AGENT AROUND NERVE Bilateral 02/18/2022    Procedure: BLOCK, NERVE, L3-L4-L5 MEDIAL BRANCH;  Surgeon: Lawrence Marin MD;  Location: [facility];  Service: Pain Management;  Laterality: Bilateral;    PLANTAR FASCIOTOMY Left 11/18/2019    Procedure: FASCIOTOMY, PLANTAR;  Surgeon: Valentino Orourke DPM;  Location: [address];  Service: Podiatry;  Laterality: Left;    RETINAL LASER PROCEDURE Left     SINUS SURGERY      SPHENOIDECTOMY Bilateral 10/22/2021    Procedure: SPHENOIDECTOMY;  Surgeon: John Prado III, MD;  Location: [facility];  Service: ENT;  Laterality: Bilateral;    TRANSFORAMINAL EPIDURAL INJECTION OF STEROID Bilateral 06/24/2020    Procedure: INJECTION, STEROID, EPIDURAL, TRANSFORAMINAL APPROACH;  Surgeon: Lawrence Marin MD;  Location: [facility];  Service: Pain Management;  Laterality: Bilateral;    TRANSFORAMINAL EPIDURAL INJECTION OF STEROID Bilateral 01/28/2022    Procedure: INJECTION, STEROID, EPIDURAL, TRANSFORAMINAL APPROACH  Bilateral TFESI L4/L5      NEEDS CONSENT;  Surgeon: Lawrence Marin MD;  Location: [facility];  Service: Pain Management;  Laterality: Bilateral;    UPPER GASTROINTESTINAL ENDOSCOPY       Allergies: see below  Medications: MAR reviewed      PHYSICAL EXAM     Physical Exam  Constitutional:       Appearance: Normal appearance.   HENT:      Head: Normocephalic and atraumatic.      Right Ear: External ear normal.      Left Ear: External ear normal.      Nose:      Comments: 2-3+ pink/pale turbinates  Eyes:      Extraocular Movements: Extraocular movements intact.      Conjunctiva/sclera: Conjunctivae normal.      Pupils: Pupils are equal, round, and reactive to light.   Cardiovascular:      Rate and Rhythm: Normal rate and regular rhythm.      Heart sounds: Normal heart sounds. No murmur heard.  Pulmonary:      Effort: Pulmonary effort is normal. No respiratory distress.      Breath sounds: Normal breath sounds. No wheezing or rales.   Musculoskeletal:      Cervical back: Normal range of motion.   Skin:     General: Skin is warm and dry.      Findings: No rash.   Neurological:      General: No focal deficit present.      Mental Status: She is alert and oriented to person, place, and time.   Psychiatric:         Mood and Affect: Mood normal.         Behavior: Behavior normal.         Thought Content: Thought content normal.         Judgment: Judgment normal.        LABORATORY STUDIES     Reviewed     ALLERGEN TESTING     Component      Latest Ref Rng & Units 1/4/2018 1/4/2018 1/4/2018          10:57 AM 10:57 AM 10:57 AM   D. farinae      <0.35 kU/L     0.43 (H)   D. farinae Class           CLASS I   Mite Dust Pteronyssinus IgE      <0.35 kU/L     0.43 (H)   D. pteronyssinus Class           CLASS I   BERMUDA GRASS      <0.35 kU/L     <0.35   Bermuda Grass Class           CLASS 0   Valentino Grass      <0.35 kU/L     <0.35   Valentino Grass Class           CLASS 0   Ferry IgE      <0.35 kU/L     <0.35   Ferry Class           CLASS 0   Plantain      <0.35 kU/L     <0.35   English Plantain Class           CLASS 0   White Oak(Quercus alba) IgE      <0.35 kU/L     <0.35   Leigh, Class           CLASS 0   Marshelder IgE      <0.35 kU/L     <0.35   Marshelder Class            CLASS 0   Ragweed, Western IgE      <0.35 kU/L     <0.35   Ragweed, Western, Class           CLASS 0   Alternaria alternata      <0.35 kU/L     <0.35   Altern. alternata Class           CLASS 0   Aspergillus Fumigatus IgE      <0.35 kU/L     <0.35   A. fumigatus Class           CLASS 0   Cat Dander      <0.35 kU/L     <0.35   Cat Epithelium Class           CLASS 0   Cockroach, IgE      <0.35 kU/L CLASS 0 <0.35     Dog Dander, IgE      <0.35 kU/L     <0.35   Dog Dander Class           CLASS 0   SHRIMP IGE      <0.35 kU/L     <0.35   Shrimp Class           CLASS 0   Crayfish      <0.35 kU/L     <0.35   Crayfish Class           CLASS 0   Crab      <0.35 kU/L     <0.35   Crab Class           CLASS 0   Lobster IgE      <0.35 kU/L     <0.35   Lobster Class           CLASS 0   IgE      0 - 100 IU/mL     59          PULMONARY FUNCTION     PFTs: 10/05/2022  FVC: 3.02L (98.7%)  FEV1: 2.49 L (101%)  Ratio: 101.9%  DLCO: 20.87    Interpretation: Spirometry is normal. Spirometry remains unimproved following bronchodilator. Lung volume determination is likely normal despite the redcued FRC and/ RV of uncertain clinical significance. DLCO is normal.     IMAGING & OTHER DIAGNOSTICS     No new chest or sinus images     ASSESSMENT & PLAN     Yanni Kaiser is a 52 y.o. female with     Allergic rhinitis: Patient sensitized to HDM and trialed Odactra without improvement in symptoms and with significant oral and ear pruritus thus medication was stopped. Symptoms remain controlled on nasal sprays.   - Continue flonase 2 SEN BID and increase azelastine to 1 SEN BID  - She is not interested in AIT at this time    Asthma: PFTs performed 10/5/22 wnl. Patient was admitted twice over the past two months with viral infections (COVID-19 and influenza A) of which she is still recovering but overall much improved since discharge. Also follows with pulmonary.   - Continue daily montelukast and cetirizine  - Continue Breo 2 puffs BID  -  Continue albuterol as needed for shortness of breath   - Encouraged patient to decrease duo nebs to as needed once feeling better     Follow up: 4 months    Patient staffed with attending Dr. Mathieu Huerta MD  Allergy/Immunology Fellow

## 2022-11-02 NOTE — PROGRESS NOTES
Outpatient Care Management  Plan of Care Follow Up Visit    Patient: Yanni Kaiser  MRN: [id number]  Date of Service: 10/31/2022  Completed by: Mague Mane RN  Referral Date: 09/09/2022    Reason for Visit   Patient presents with    Update Plan Of Care       Brief Summary: Pt voiced that she has recovered from the flu and COVID-19. Pt states that she is taking her medications as prescribed.    Patient Summary     Involvement of Care:  Do I have permission to speak with other family members about your care?   yes    Patient Reported Labs & Vitals:  1.  Any Patient Reported Labs & Vitals?     2.  Patient Reported Blood Pressure:     3.  Patient Reported Pulse:     4.  Patient Reported Weight (Kg):     5.  Patient Reported Blood Glucose (mg/dl):       Medical and social history was reviewed with patient and/or caregiver.     Clinical Assessment     Reviewed and provided basic information on available community resources for mental health, transportation, wellness resources, and palliative care programs with patient and/or caregiver.     Complex Care Plan     Care plan was discussed and completed today with input from patient and/or caregiver.    Patient Instructions     Instructions were provided via the [facility] patient resources and are available for the patient to view on the patient portal.    Next Steps: Discuss with pt the ways to avoid COPD flare-ups.  Follow up in about 2 weeks (around 11/14/2022) for RN Follow.    Todays OPCM Self-Management Care Plan was developed with the patients/caregivers input and was based on identified barriers from todays assessment.  Goals were written today with the patient/caregiver and the patient has agreed to work towards these goals to improve his/her overall well-being. Patient verbalized understanding of the care plan, goals, and all of today's instructions. Encouraged patient/caregiver to communicate with his/her physician and health care team about health  conditions and the treatment plan.  Provided my contact information today and encouraged patient/caregiver to call me with any questions as needed.

## 2022-11-04 ENCOUNTER — PATIENT MESSAGE (OUTPATIENT)
Dept: BARIATRICS | Facility: CLINIC | Age: 52
End: 2022-11-04
Payer: MEDICARE

## 2022-11-05 ENCOUNTER — HOSPITAL ENCOUNTER (OUTPATIENT)
Dept: RADIOLOGY | Facility: HOSPITAL | Age: 52
Discharge: HOME | End: 2022-11-05
Attending: FAMILY MEDICINE
Payer: MEDICARE

## 2022-11-05 DIAGNOSIS — R22.42 MASS OF LEFT LOWER EXTREMITY: ICD-10-CM

## 2022-11-05 PROCEDURE — 76882 US LMTD JT/FCL EVL NVASC XTR: CPT | Mod: TC,LT

## 2022-11-05 PROCEDURE — 76882 US LMTD JT/FCL EVL NVASC XTR: CPT | Mod: 26,LT,, | Performed by: RADIOLOGY

## 2022-11-05 PROCEDURE — 76882 US SOFT TISSUE, LOWER EXTREMITY, LEFT: ICD-10-PCS | Mod: 26,LT,, | Performed by: RADIOLOGY

## 2022-11-28 ENCOUNTER — OUTPATIENT CASE MANAGEMENT (OUTPATIENT)
Dept: ADMINISTRATIVE | Facility: OTHER | Age: 52
End: 2022-11-28
Payer: MEDICARE

## 2022-11-28 NOTE — PROGRESS NOTES
Outpatient Care Management  Plan of Care Follow Up Visit    Patient: Yanni Kaiser  MRN: [id number]  Date of Service: 11/28/2022  Completed by: Mague Mane RN  Referral Date: 09/09/2022    No chief complaint on file.      Brief Summary: Pt voiced that she is doing fine with her breathing at this time. Pt voiced that she is taking her medications. Informed pt of case closure. Pt is in agreement at this time. Informed pt that she can notify this CM of concerns.    Patient Summary     Involvement of Care:  Do I have permission to speak with other family members about your care?   yes    Patient Reported Labs & Vitals:  1.  Any Patient Reported Labs & Vitals?     2.  Patient Reported Blood Pressure:     3.  Patient Reported Pulse:     4.  Patient Reported Weight (Kg):     5.  Patient Reported Blood Glucose (mg/dl):       Medical and social history was reviewed with patient and/or caregiver.     Clinical Assessment     Reviewed and provided basic information on available community resources for mental health, transportation, wellness resources, and palliative care programs with patient and/or caregiver.     Complex Care Plan     Care plan was discussed and completed today with input from patient and/or caregiver.    Patient Instructions     Instructions were provided via the [employer] patient resources and are available for the patient to view on the patient portal.    Next Steps: na    No follow-ups on file.    Todays OPCM Self-Management Care Plan was developed with the patients/caregivers input and was based on identified barriers from todays assessment.  Goals were written today with the patient/caregiver and the patient has agreed to work towards these goals to improve his/her overall well-being. Patient verbalized understanding of the care plan, goals, and all of today's instructions. Encouraged patient/caregiver to communicate with his/her physician and health care team about health conditions and  the treatment plan.  Provided my contact information today and encouraged patient/caregiver to call me with any questions as needed.

## 2022-12-01 RX ORDER — PREGABALIN 50 MG/1
50 CAPSULE ORAL 3 TIMES DAILY
Qty: 90 CAPSULE | Refills: 3 | Status: SHIPPED | OUTPATIENT
Start: 2022-12-01 | End: 2023-01-13 | Stop reason: SDUPTHER

## 2022-12-02 ENCOUNTER — TELEPHONE (OUTPATIENT)
Dept: PAIN MEDICINE | Facility: CLINIC | Age: 52
End: 2022-12-02
Payer: MEDICARE

## 2022-12-02 NOTE — TELEPHONE ENCOUNTER
This message is for patient in regards to his/her appointment 12/05/22 at 10:00 am   With Dr. Lawrence Marin MD      [facility] Policy: For the safety of all patients and staff members.   During this visit we're informing all patients and visitors to wear face mask at all time here at [facility].  If you have any questions or concerns please contact [phone]

## 2022-12-05 ENCOUNTER — PATIENT MESSAGE (OUTPATIENT)
Dept: PAIN MEDICINE | Facility: OTHER | Age: 52
End: 2022-12-05
Payer: MEDICARE

## 2022-12-05 ENCOUNTER — OFFICE VISIT (OUTPATIENT)
Dept: PAIN MEDICINE | Facility: CLINIC | Age: 52
End: 2022-12-05
Payer: MEDICARE

## 2022-12-05 VITALS
SYSTOLIC BLOOD PRESSURE: 137 MMHG | RESPIRATION RATE: 19 BRPM | DIASTOLIC BLOOD PRESSURE: 93 MMHG | HEIGHT: 64 IN | HEART RATE: 83 BPM | TEMPERATURE: 98 F | WEIGHT: 293 LBS | BODY MASS INDEX: 50.02 KG/M2

## 2022-12-05 DIAGNOSIS — M47.816 LUMBAR SPONDYLOSIS: ICD-10-CM

## 2022-12-05 DIAGNOSIS — M54.16 LUMBAR RADICULOPATHY: Primary | ICD-10-CM

## 2022-12-05 DIAGNOSIS — G89.4 CHRONIC PAIN DISORDER: ICD-10-CM

## 2022-12-05 DIAGNOSIS — E66.01 MORBID OBESITY: ICD-10-CM

## 2022-12-05 DIAGNOSIS — M51.36 DDD (DEGENERATIVE DISC DISEASE), LUMBAR: ICD-10-CM

## 2022-12-05 DIAGNOSIS — G89.4 CHRONIC PAIN SYNDROME: ICD-10-CM

## 2022-12-05 PROCEDURE — 4010F ACE/ARB THERAPY RXD/TAKEN: CPT | Mod: CPTII,S$GLB,, | Performed by: ANESTHESIOLOGY

## 2022-12-05 PROCEDURE — 1160F RVW MEDS BY RX/DR IN RCRD: CPT | Mod: CPTII,S$GLB,, | Performed by: ANESTHESIOLOGY

## 2022-12-05 PROCEDURE — 3061F PR NEG MICROALBUMINURIA RESULT DOCUMENTED/REVIEW: ICD-10-PCS | Mod: CPTII,S$GLB,, | Performed by: ANESTHESIOLOGY

## 2022-12-05 PROCEDURE — 4010F PR ACE/ARB THEARPY RXD/TAKEN: ICD-10-PCS | Mod: CPTII,S$GLB,, | Performed by: ANESTHESIOLOGY

## 2022-12-05 PROCEDURE — 99999 PR PBB SHADOW E&M-EST. PATIENT-LVL V: ICD-10-PCS | Mod: PBBFAC,,, | Performed by: ANESTHESIOLOGY

## 2022-12-05 PROCEDURE — 1160F PR REVIEW ALL MEDS BY PRESCRIBER/CLIN PHARMACIST DOCUMENTED: ICD-10-PCS | Mod: CPTII,S$GLB,, | Performed by: ANESTHESIOLOGY

## 2022-12-05 PROCEDURE — 3075F SYST BP GE 130 - 139MM HG: CPT | Mod: CPTII,S$GLB,, | Performed by: ANESTHESIOLOGY

## 2022-12-05 PROCEDURE — 1159F MED LIST DOCD IN RCRD: CPT | Mod: CPTII,S$GLB,, | Performed by: ANESTHESIOLOGY

## 2022-12-05 PROCEDURE — 99214 PR OFFICE/OUTPT VISIT, EST, LEVL IV, 30-39 MIN: ICD-10-PCS | Mod: GC,S$GLB,, | Performed by: ANESTHESIOLOGY

## 2022-12-05 PROCEDURE — 3008F PR BODY MASS INDEX (BMI) DOCUMENTED: ICD-10-PCS | Mod: CPTII,S$GLB,, | Performed by: ANESTHESIOLOGY

## 2022-12-05 PROCEDURE — 1159F PR MEDICATION LIST DOCUMENTED IN MEDICAL RECORD: ICD-10-PCS | Mod: CPTII,S$GLB,, | Performed by: ANESTHESIOLOGY

## 2022-12-05 PROCEDURE — 3080F PR MOST RECENT DIASTOLIC BLOOD PRESSURE >= 90 MM HG: ICD-10-PCS | Mod: CPTII,S$GLB,, | Performed by: ANESTHESIOLOGY

## 2022-12-05 PROCEDURE — 3061F NEG MICROALBUMINURIA REV: CPT | Mod: CPTII,S$GLB,, | Performed by: ANESTHESIOLOGY

## 2022-12-05 PROCEDURE — 3044F PR MOST RECENT HEMOGLOBIN A1C LEVEL <7.0%: ICD-10-PCS | Mod: CPTII,S$GLB,, | Performed by: ANESTHESIOLOGY

## 2022-12-05 PROCEDURE — 99214 OFFICE O/P EST MOD 30 MIN: CPT | Mod: GC,S$GLB,, | Performed by: ANESTHESIOLOGY

## 2022-12-05 PROCEDURE — 3080F DIAST BP >= 90 MM HG: CPT | Mod: CPTII,S$GLB,, | Performed by: ANESTHESIOLOGY

## 2022-12-05 PROCEDURE — 99999 PR PBB SHADOW E&M-EST. PATIENT-LVL V: CPT | Mod: PBBFAC,,, | Performed by: ANESTHESIOLOGY

## 2022-12-05 PROCEDURE — 3066F PR DOCUMENTATION OF TREATMENT FOR NEPHROPATHY: ICD-10-PCS | Mod: CPTII,S$GLB,, | Performed by: ANESTHESIOLOGY

## 2022-12-05 PROCEDURE — 3044F HG A1C LEVEL LT 7.0%: CPT | Mod: CPTII,S$GLB,, | Performed by: ANESTHESIOLOGY

## 2022-12-05 PROCEDURE — 3066F NEPHROPATHY DOC TX: CPT | Mod: CPTII,S$GLB,, | Performed by: ANESTHESIOLOGY

## 2022-12-05 PROCEDURE — 3008F BODY MASS INDEX DOCD: CPT | Mod: CPTII,S$GLB,, | Performed by: ANESTHESIOLOGY

## 2022-12-05 PROCEDURE — 3075F PR MOST RECENT SYSTOLIC BLOOD PRESS GE 130-139MM HG: ICD-10-PCS | Mod: CPTII,S$GLB,, | Performed by: ANESTHESIOLOGY

## 2022-12-05 NOTE — H&P (VIEW-ONLY)
Chronic patient Established Note (Follow up visit)      Chief Complaint: chronic Pain    Consulted by: Dr. Marin (Pain Management)    Interval history 12/5/2022:  Radha returns to clinic today for all body and lower back pain. Today, the pain is unchanged from previous visit and described as 9/10. The low back, left shoulder, left elbow, right knee, and right ankle are painful, in that order of decreasing severity. The pain is described as aching. Endorsed a fall about 2 weeks ago but denied trauma. She needs a refill for the Lyrica and Flexeril. Taking percocet 7.5mg BID-TID from Dr. Negron. She has not been doing the functional restoration program exercises.  Endorsed numbness in the feet. Patient has completed aquatic therapy previously with moderate relief but does not want to repeat at this time.    Interval History 4/12/2022: Patient presents to clinic today for follow up of chronic low back pain. Patient is s/p Bilateral L3, L4 and L5 Lumbar Medial Branch Block on 2/18/22.  She endorses 50% pain relief and improvement in function following the injection that lasted 1 day. Pain remains stable in low back and radiates down posterolateral LLE to the foot. She endorses chronic intermittent numbness/tingling in bilateral lower extremities. She feels some weakness in LLE that is chronic. Pain is sharp/dull/achy. Pain is currently rated 7/10. She continues to take lyrica 50mg BID, flexeril and percocet 7.5/325mg (uses it about twice a day prn). She was previously doing aquatherapy and stopped due to a yeast infection. She plans to start the [facility] and has an orientation tomorrow.      Interval Hx 2/14/22  Patient returns today in virtual follow up. She is s/p bilateral L4/5 TFESI 1/28/22 which she reports only gave her 2 days of relief and her pain returned. In the interim she has continued with aqua therapy and was seen in [facility]. They plan to continue PT once her aqua therapy is done. She reports continued low  back pain, worse with prolonged standing and walking and better with rest. She reports she has more back pain than leg pain at this time. She also reports bilateral hip pain. She continues with Lyrica 50 mg BID Flexeril 190 gm TID and Percocet 7.5/325 daily prn. Denies any new numbness tingling weakness b/b incontinence.      Interval History 1/18/2022:  Yanni Kaiser returns to clinic for follow up of low back pain. She reports resolution dermotomal right sided mid back pain with dermatomal distribution suspicious for shingles despite not having a rash s/p valtrex 1000mg BID for 7 days. In regard to low back pain, she had a L4-L5 TFESI scheduled that was cancelled due to recent abx for chronic sinusitis. She continues to have chronic left sided low back with with radiation down to the lateral thigh and into the anterior and posterior leg and into the plantar aspects of the feet.  She reports low back pain leg heaviness with walking that is releved with leaning on an object. Sitting relieves leg heaviness but not low back pain. Treats sx with topical voltaren gel, lyrica 50mg TID, and percocet 7.5-325 TID (#90 dispensed 1/17/22). Pain meds help but pain persists. She does report urge type incontinence as well as overflow type leakage between voids including overnight. She reports occasional significant LE weakness. Denies saddle anesthesia or ataxia.     Interval History 12/20/2021:  Yanni Kaiser presents to the clinmiic for a follow-up appointment for mid-back pain. Since the last visit, Yanni Kaiser states the pain has been persistant. She states that her biggest reason she is here today is for right-sided mid-back pain that started about 2 weeks ago as an itching in her right mid-back that wrapped around her right flank. It started as an itching, progressed to a burning after a couple of days, and is now more of an achy pain. She does not recall if she had a rash or not. She went to  urgent care about a week ago and was prescribed Valtrex, but has not started taking it yet because she is nervous about taking a new medicine. She continues to have low back pain that radiates down the legs, mostly in the LLE.      Interval History 9/29/2020:  The patient is a virtual appointment today for follow-up of back and leg pain.  She is now status post L4/5 interlaminar epidural steroid injection.  She also reports limited benefit from this procedure.  Her pain is mainly located across the lower back with intermittent radiation into the legs.  Her back pain is currently her greatest complaint.  It bothers her the most when she stands for a long time in changes positions.  She is still in physical therapy but states that she missed her appointment today due to her nephew being ill.  Her pain today is 10/10.     Interval History 8/25/2020:  The patient is here today for follow-up of chronic lower back pain.  The pain radiates down the posterolateral aspect of both legs to her shin.  She had limited benefit with bilateral L4/5 TF NAKITA in the past.  We previously discussed IL NAKITA which she would like to schedule.  Since her previous encounter, she has started physical therapy which she thinks is helpful.  She has increased her home activity regimen as previously discussed.  Her pain today is 9/10     Interval History 7/14/2020:  The patient presents for follow up of lower back and leg pain.  She is s/p bilateral L4/5 TF NAKITA on 6/24/20 with limited benefit of leg pain.  She denies any respiratory changes such as fever, cough, or shortness of breath.  She continues to report pain that starts across the lower back with radiation down the side of both legs.  She has associated numbness and tingling to both legs.  Her pain worsens when she walks and when she is active.  She says that she had to PT appointments did not have follow-up appointments made.  The previous note indicates that she is to continue with PT she  is seeing Snow Collazo for chronic pain as occasion.  She says that she has been taking Lyrica at night that is making her wake up in the middle of the night with dizziness.  She stops it as instructed by PCP.  She has an appointment with her neurologist tomorrow.  Her pain today is 10/10.     Previous Encounter:  Yanni Kaiser presents to the clinic for the evaluation of lower back pain. The pain started 3 years ago following no specific incident and symptoms have been worsening.The pain is located in the lumbar area and radiates to the left leg mostly but also into the right leg. Non-specific dermatomal distribution.  The pain is described as aching, sharp and shooting and is rated as 10/10. The pain is rated with a score of  10/10 on the BEST day and a score of 10/10 on the WORST day.  Symptoms interfere with daily activity and sleeping. The pain is exacerbated by Sitting, Walking, Lifting and Getting out of bed/chair.  The pain is mitigated by heat, ice, medications and physical therapy. She reports spending 4 hours per day reclining. The patient reports 0 hours of uninterrupted sleep per night.     Physical Therapy/Home Exercise: yes   Physical Therapy:  Scheduled to start today.     Pain Medications:         Currently taking: lyrica, tizanidine, effexor, topamax, imitrex    Has tried in the past: norco, percocet, tramadol, ibuprofen, mobic, neurontin, flexeril, voltaren gel    Interventional Procedures:  6/4/20 Bilateral L4/5 TF NAKITA- only a few days of pain relief   2/18/2022: Bilateral L3, L4 and L5 Lumbar Medial Branch Block  50% pain relief and improvement in function     Relevant Surgeries:  none    Affecting sleep?  Yes    Affecting daily activities?  Yes    Affecting mood?  Yes     Work status:  Disabled 2/2 seizures/pseudotumor cerebri     Pt does smoke cigarettes daily. Denies hx of substance abuse.       Past Medical History:   Diagnosis Date    Allergy     Anemia     Anxiety     Asthma      Back pain     Chronic bronchitis     Chronic obstructive pulmonary disease, unspecified COPD type 2022    Cigarette smoker     DDD (degenerative disc disease), lumbar 2020    Depression     Diabetes mellitus with peripheral circulatory disorder 2022    Diabetes with neurologic complications     DUB (dysfunctional uterine bleeding) 10/16/2018    GERD (gastroesophageal reflux disease)     High cholesterol     Hyperprolactinemia     Hypertension     Influenza A 2020    Neuromuscular disorder     Obese body habitus     Obesity     Pseudotumor cerebri     Renal manifestation of secondary diabetes mellitus     Respiratory failure     Seizures     Simple endometrial hyperplasia     Sleep apnea     Smoker     Tobacco dependence     Urinary incontinence        Past Surgical History:   Procedure Laterality Date    ANTROSTOMY OF MAXILLARY SINUS AT INFERIOR NASAL MEATUS  10/22/2021    Procedure: MAXILLARY ANTROSTOMY, AT INFERIOR NASAL MEATUS;  Surgeon: John Prado III, MD;  Location: [facility] OR;  Service: ENT;;    BREAST CYST ASPIRATION       SECTION      X 1    CHOLECYSTECTOMY      COLONOSCOPY      COLONOSCOPY N/A 2019    Procedure: COLONOSCOPY;  Surgeon: Jagruti Sweeney MD;  Location: [facility] ENDO (2ND FLR);  Service: Endoscopy;  Laterality: N/A;  BMI is 63/gastroparesis/3 days full liquid diet 1 day clear liquid see telephone encounter by Ciara Brown     EPIDURAL STEROID INJECTION N/A 2020    Procedure: INJECTION, STEROID, EPIDURAL, L5-S1 IL;  Surgeon: Lawrence Marin MD;  Location: [facility] PAIN MGT;  Service: Pain Management;  Laterality: N/A;    ESOPHAGEAL MANOMETRY WITH MEASUREMENT OF IMPEDANCE N/A 2019    Procedure: MANOMETRY-ESOPHAGEAL-WITH IMPEDANCE;  Surgeon: Jagruti Sweeney MD;  Location: [facility] ENDO (2ND FLR);  Service: Endoscopy;  Laterality: N/A;  Hold Narcotics x 1 days   Hold TCA x 1 days  Propofol only. No fentanyl or benzodiazepine during sedation. If  additional sedation needed, discuss with Dr. Sweeney.  10/29 - pt confirmed appt    ESOPHAGOGASTRODUODENOSCOPY N/A 01/14/2019    Procedure: EGD (ESOPHAGOGASTRODUODENOSCOPY);  Surgeon: Jagruti Sweeney MD;  Location: [facility] V[facility]ESSA (2ND FLR);  Service: Endoscopy;  Laterality: N/A;  BMI is 63/gastroparesis/3 days full liquid diet 1 day clear liquid see telephone encounter by Ciara Brown     ESOPHAGOGASTRODUODENOSCOPY N/A 11/05/2019    Procedure: ESOPHAGOGASTRODUODENOSCOPY (EGD);  Surgeon: Jagruti Sweeney MD;  Location: [facility] ENDO (2ND FLR);  Service: Endoscopy;  Laterality: N/A;  EGD with EndoFlip /+/- Botox  2nd floor for gastroparesis/BMI 63 **367lbs**  Bariatric Stretcher needed  Manometry probe to be placed endoscopically during EGD procedure  Due to change in protocol, Full liquid diet for 3 days/ 1 day of clear liquids  Hi    ESOPHAGOGASTRODUODENOSCOPY N/A 12/14/2020    Procedure: ESOPHAGOGASTRODUODENOSCOPY (EGD) with BRAVO;  Surgeon: Jagruti Sweeney MD;  Location: [facility] (2ND FLR);  Service: Endoscopy;  Laterality: N/A;  with Dilation  2nd floor due to BMI 56.20 (327 lbs) and gastroparesis  3 days full liquid diet and 1 day clears    ESOPHAGOGASTRODUODENOSCOPY N/A 04/15/2021    Procedure: ESOPHAGOGASTRODUODENOSCOPY (EGD);  Surgeon: Jagruti Sweeney MD;  Location: [facility] (2ND FLR);  Service: Endoscopy;  Laterality: N/A;  Endoflip  2nd floor-gastroparesis, BMI 57.18, bariatric stretcher  full liquid diet x3 days, clear liquid diet x1 day prior to procedure  propofol only  covid test 4/12 primary care, instructions emailed-[facility]    FOOT FRACTURE SURGERY Left     FUNCTIONAL ENDOSCOPIC SINUS SURGERY (FESS) USING COMPUTER-ASSISTED NAVIGATION Bilateral 10/22/2021    Procedure: FESS, USING COMPUTER-ASSISTED NAVIGATION;  Surgeon: John Prado III, MD;  Location: [facility];  Service: ENT;  Laterality: Bilateral;  FOLLOW DR PRADO ANESTHESIA PROTOCAL / pt will stay 23 hour post surgery for SHARATH observation     HYSTEROSCOPY WITH DILATION AND CURETTAGE OF UTERUS N/A 10/16/2018    KJB    INJECTION OF ANESTHETIC AGENT AROUND NERVE Bilateral 10/23/2020    Procedure: BLOCK, NERVE  bilateral L3,4,5 MBB;  Surgeon: Lawrence Marin MD;  Location: [facility] PAIN MGT;  Service: Pain Management;  Laterality: Bilateral;  bilateral L3,4,5 MBB   consent needed    INJECTION OF ANESTHETIC AGENT AROUND NERVE Bilateral 02/18/2022    Procedure: BLOCK, NERVE, L3-L4-L5 MEDIAL BRANCH;  Surgeon: Lawrence Marin MD;  Location: [facility] PAIN MGT;  Service: Pain Management;  Laterality: Bilateral;    PLANTAR FASCIOTOMY Left 11/18/2019    Procedure: FASCIOTOMY, PLANTAR;  Surgeon: Valentino Orourke DPM;  Location: [address];  Service: Podiatry;  Laterality: Left;    RETINAL LASER PROCEDURE Left     SINUS SURGERY      SPHENOIDECTOMY Bilateral 10/22/2021    Procedure: SPHENOIDECTOMY;  Surgeon: John Prado III, MD;  Location: [facility] OR;  Service: ENT;  Laterality: Bilateral;    TRANSFORAMINAL EPIDURAL INJECTION OF STEROID Bilateral 06/24/2020    Procedure: INJECTION, STEROID, EPIDURAL, TRANSFORAMINAL APPROACH;  Surgeon: Lawrence Marin MD;  Location: [facility] PAIN MGT;  Service: Pain Management;  Laterality: Bilateral;    TRANSFORAMINAL EPIDURAL INJECTION OF STEROID Bilateral 01/28/2022    Procedure: INJECTION, STEROID, EPIDURAL, TRANSFORAMINAL APPROACH  Bilateral TFESI L4/L5      NEEDS CONSENT;  Surgeon: Lawrence Marin MD;  Location: [facility] PAIN MGT;  Service: Pain Management;  Laterality: Bilateral;    UPPER GASTROINTESTINAL ENDOSCOPY         Review of patient's allergies indicates:   Allergen Reactions    Gabapentin Other (See Comments)     Makes her twitch       Current Outpatient Medications   Medication Sig Dispense Refill    albuterol (PROVENTIL/VENTOLIN HFA) 90 mcg/actuation inhaler Inhale 2 puffs into the lungs every 6 (six) hours as needed for Wheezing or Shortness of Breath. Rescue 54 g 6    albuterol-ipratropium (DUO-NEB) 2.5 mg-0.5 mg/3 mL  nebulizer solution Take 3 mLs by nebulization every 6 (six) hours as needed for Wheezing. Rescue 75 mL 2    alcohol swabs (BD ALCOHOL SWABS) PadM Apply 1 each topically 2 (two) times a day. 200 each 4    amitriptyline (ELAVIL) 25 MG tablet Take 1 tablet (25 mg total) by mouth nightly as needed for Insomnia. 30 tablet 0    ammonium lactate 12 % Crea Apply twice daily to affected parts both feet as needed. 140 g 11    azelastine (ASTELIN) 137 mcg (0.1 %) nasal spray 2 sprays (274 mcg total) by Nasal route 2 (two) times daily. 60 mL 11    blood sugar diagnostic Strp 1 each by Misc.(Non-Drug; Combo Route) route 4 (four) times daily before meals and nightly. ACCU CHEK ELVIA PLUS METER 200 each 3    blood-glucose meter (ACCU-CHEK ELVIA PLUS METER) Misc TEST FOUR TIMES DAILY BEFORE MEALS AND EVERY NIGHT 90 each 1    cetirizine (ZYRTEC) 10 MG tablet Take 1 tablet (10 mg total) by mouth daily as needed for Allergies (itching). 30 tablet 11    clobetasoL (TEMOVATE) 0.05 % external solution Apply topically 2 (two) times daily. Prn scalp itch or rash 60 mL 1    cyclobenzaprine (FLEXERIL) 10 MG tablet       diclofenac sodium (VOLTAREN) 1 % Gel Apply 2 g topically 3 (three) times daily. Apply to painful joints 300 g 2    empagliflozin (JARDIANCE) 10 mg tablet Take 1 tablet (10 mg total) by mouth once daily. 90 tablet 1    fluticasone furoate-vilanteroL (BREO ELLIPTA) 200-25 mcg/dose DsDv diskus inhaler Inhale 1 puff into the lungs once daily. Controller 1 each 5    fluticasone propionate (FLONASE) 50 mcg/actuation nasal spray 2 sprays (100 mcg total) by Each Nostril route 2 (two) times a day. 32 g 11    lancets (ACCU-CHEK FASTCLIX LANCET DRUM) Misc 1 Device by Misc.(Non-Drug; Combo Route) route 2 (two) times a day. 200 each 4    lancets (ACCU-CHEK SOFTCLIX LANCETS) Misc 1 Device by Misc.(Non-Drug; Combo Route) route 4 (four) times daily. 200 each 3    levocetirizine (XYZAL) 5 MG tablet Take 1 tablet (5 mg total) by mouth every  evening. 90 tablet 3    lidocaine (LIDODERM) 5 % Place 1 patch onto the skin once daily. Remove & Discard patch within 12 hours or as directed by MD 15 patch 0    LIDOcaine HCL 2% (XYLOCAINE) 2 % jelly Apply topically as needed. Apply topically once nightly to affected part of foot/feet. 30 mL 2    LINZESS 145 mcg Cap capsule Take 145 mcg by mouth once daily.      losartan (COZAAR) 100 MG tablet TAKE 1 TABLET(100 MG) BY MOUTH EVERY DAY 90 tablet 0    montelukast (SINGULAIR) 10 mg tablet Take 1 tablet (10 mg total) by mouth every evening. 90 tablet 3    nicotine (NICODERM CQ) 21 mg/24 hr Place 1 patch onto the skin once daily. 28 patch 0    nystatin-triamcinolone (MYCOLOG) ointment Apply topically 2 (two) times daily. 60 g 2    ondansetron (ZOFRAN) 8 MG tablet Take 1 tablet (8 mg total) by mouth every 8 (eight) hours as needed for Nausea. 90 tablet 11    oxyCODONE-acetaminophen (PERCOCET) 7.5-325 mg per tablet Take 1 tablet by mouth 3 (three) times daily as needed for Pain. 90 tablet 0    pantoprazole (PROTONIX) 40 MG tablet Take 1 tablet (40 mg total) by mouth 2 (two) times daily. 180 tablet 0    pregabalin (LYRICA) 50 MG capsule Take 1 capsule (50 mg total) by mouth 3 (three) times daily. 90 capsule 3    QUEtiapine (SEROQUEL) 25 MG Tab Take 1 tablet (25 mg total) by mouth once daily. 90 tablet 0    semaglutide 2 mg/dose (8 mg/3 mL) PnIj Inject 2 mg into the skin every 7 days. 9 mL 0    sulindac (CLINORIL) 200 MG Tab Take 1 tablet (200 mg total) by mouth 2 (two) times daily. 30 tablet 0    topiramate (TOPAMAX) 50 MG tablet Take 2 tablets (100 mg total) by mouth every evening. 180 tablet 2    triamcinolone acetonide 0.1% (KENALOG) 0.1 % cream Apply topically 2 (two) times daily. Prn focally for rash spots on forehead and legs.Stop using steroid topical when skin is smooth and non itchy.  Do not treat dark or red coloring. 45 g 0    venlafaxine (EFFEXOR-XR) 75 MG 24 hr capsule Take 1 capsule (75 mg total) by mouth  once daily. NO FURTHER REFILL WITHOUT APT 90 capsule 1    EPINEPHrine (EPIPEN) 0.3 mg/0.3 mL AtIn Inject 0.3 mLs (0.3 mg total) into the muscle once. for 1 dose 2 each 1     Current Facility-Administered Medications   Medication Dose Route Frequency Provider Last Rate Last Admin    albuterol inhaler 2 puff  2 puff Inhalation Q20 Min PRN Carlyle Gordillo MD        diphenhydrAMINE injection 25 mg  25 mg Intravenous Once PRN Carlyle Gordillo MD        EPINEPHrine (EPIPEN) 0.3 mg/0.3 mL pen injection 0.3 mg  0.3 mg Intramuscular PRN Carlyle Gordillo MD        methylPREDNISolone sodium succinate injection 40 mg  40 mg Intravenous Once PRN Carlyle Gordillo MD        ondansetron disintegrating tablet 4 mg  4 mg Oral Once PRN Carlyle Gordillo MD        sodium chloride 0.9% 500 mL flush bag   Intravenous PRN Carlyle Gordillo MD        sodium chloride 0.9% flush 10 mL  10 mL Intravenous PRN Carlyle Gordillo MD           Family History   Problem Relation Age of Onset    Hypertension Mother     Diabetes Mother     Colon cancer Mother 50    Colon polyps Mother     Cataracts Mother     Heart disease Father     COPD Father     Heart attack Father     Hypertension Father     Ulcers Father     Cataracts Father     Glaucoma Father     No Known Problems Brother     Diabetes Daughter         prediabetes    Diabetes Daughter         prediabetic    Hypertension Daughter     Obesity Daughter     No Known Problems Daughter     No Known Problems Son     No Known Problems Maternal Aunt     No Known Problems Maternal Uncle     No Known Problems Paternal Aunt     No Known Problems Paternal Uncle     Cancer Maternal Grandmother     Breast cancer Maternal Grandmother     Colon cancer Maternal Grandmother     Colon polyps Maternal Grandmother     No Known Problems Maternal Grandfather     No Known Problems Paternal Grandmother     No Known Problems Paternal Grandfather     Celiac disease Neg Hx     Cirrhosis Neg Hx     Crohn's disease Neg Hx      "Cystic fibrosis Neg Hx     Esophageal cancer Neg Hx     Hemochromatosis Neg Hx     Inflammatory bowel disease Neg Hx     Irritable bowel syndrome Neg Hx     Liver cancer Neg Hx     Liver disease Neg Hx     Rectal cancer Neg Hx     Stomach cancer Neg Hx     Ulcerative colitis Neg Hx     Jonnie's disease Neg Hx     Tuberculosis Neg Hx     Lymphoma Neg Hx     Scleroderma Neg Hx     Rheum arthritis Neg Hx     Melanoma Neg Hx     Multiple sclerosis Neg Hx     Psoriasis Neg Hx     Lupus Neg Hx     Skin cancer Neg Hx     Amblyopia Neg Hx     Blindness Neg Hx     Macular degeneration Neg Hx     Retinal detachment Neg Hx     Strabismus Neg Hx     Stroke Neg Hx     Thyroid disease Neg Hx     Allergic rhinitis Neg Hx     Allergies Neg Hx     Angioedema Neg Hx     Asthma Neg Hx     Eczema Neg Hx     Immunodeficiency Neg Hx     Rhinitis Neg Hx     Urticaria Neg Hx     Atopy Neg Hx        Social History     Socioeconomic History    Marital status:     Number of children: 4   Occupational History    Occupation: disable   Tobacco Use    Smoking status: Every Day     Packs/day: 0.25     Years: 27.00     Pack years: 6.75     Types: Cigarettes     Start date: 1/1/1992     Passive exposure: Current    Smokeless tobacco: Never    Tobacco comments:     "1/2 pack per every 2 days or so"           10/21/22 patient says its less than half a pack now. Has been cutting back.   Substance and Sexual Activity    Alcohol use: Yes     Alcohol/week: 1.0 standard drink     Types: 1 Shots of liquor per week     Comment: occasionally    Drug use: No    Sexual activity: Yes     Partners: Male     Birth control/protection: None     Comment:      Social Determinants of Health     Financial Resource Strain: High Risk    Difficulty of Paying Living Expenses: Very hard   Food Insecurity: Food Insecurity Present    Worried About Running Out of Food in the Last Year: Often true    Ran Out of Food in the Last Year: Sometimes true   Transportation " Needs: Unmet Transportation Needs    Lack of Transportation (Medical): Yes    Lack of Transportation (Non-Medical): Yes   Physical Activity: Inactive    Days of Exercise per Week: 0 days    Minutes of Exercise per Session: 0 min   Stress: Stress Concern Present    Feeling of Stress : To some extent   Social Connections: Unknown    Frequency of Communication with Friends and Family: Three times a week    Frequency of Social Gatherings with Friends and Family: Once a week    Active Member of Clubs or Organizations: Yes    Attends Club or Organization Meetings: More than 4 times per year    Marital Status:    Housing Stability: High Risk    Unable to Pay for Housing in the Last Year: Yes    Number of Places Lived in the Last Year: 1    Unstable Housing in the Last Year: No              Objective:        GEN: fully alert, oriented. Well developed, well nourished. No acute distress.   HEENT: No trauma. Mucous membranes moist. Nares patent bilaterally.  PSYCH: Normal affect. Thought content appropriate.  CHEST: Breathing symmetric. No audible wheezing.  SKIN: Warm, pink, dry. No rash on exposed areas.   EXT: No cyanosis, clubbing, or edema. No color change or changes in nail or hair growth  Gait: pt able to walk independently without assistive device or support.     Lumbar Spine Exam:       Inspection: No erythema, bruising.       Palpation: (+) TTP of lumbar paraspinals        ROM:  Limited in flexion, extension, lateral bending.       (+) Facet loading bilaterally      (-) Straight Leg Raise bilaterally      (-) ARCHIE bilaterally  Hip Exam:      Inspection: No gross deformity or apparent leg length discrepancy      Palpation: (-) TTP to bilateral greater trochanteric bursas .       ROM: Limitation or pain in internal rotation, external rotation   Neurologic Exam:     Alert. Speech is fluent and appropriate.     Strength:  4-5/5 throughout bilateral lower extremities     Sensation:  Grossly intact to light touch  in bilateral lower extremities     Reflexes: 2+ in b/l patella, achilles     Tone: No abnormality appreciated in bilateral lower extremities     No Clonus     Downgoing toes on plantar stimulation     (-) Salazar bilaterally         Imaging:     MRI Lumbar Spine 5/22/2020:   COMPARISON:  MRI lumbar spine 08/28/2018     FINDINGS:  Straightening of the normal lumbar lordosis.  Mild grade 1 retrolisthesis L5 on S1     Vertebral body heights are maintained.  Several T1 and T2 hyperintense lesions within the L2 and L3 vertebral body favored to represent osseous hemangiomas.  Otherwise normal marrow signal.  No fracture.     Multilevel partial disc desiccation throughout the lumbar spine.  Posterior annular fissuring at L2-L3.     Distal spinal cord is unremarkable.  Conus terminates at L1-L2.     Degenerative findings:     T12-L1: No disc herniation, spinal canal stenosis, or neural foraminal narrowing.     L1-L2: No disc herniation, spinal canal stenosis, or neural foraminal narrowing.     L2-L3: No disc herniation, spinal canal stenosis or neural foraminal narrowing.     L3-L4: Mild circumferential disc bulge without spinal canal stenosis or neural foraminal narrowing.     L4-L5: Circumferential disc bulge, facet arthropathy, and ligamentum flavum hypertrophy resulting in moderate spinal canal stenosis and moderate bilateral neural foraminal narrowing.     L5-S1: Circumferential disc bulge and facet arthropathy without spinal canal stenosis or neural foraminal narrowing.     Paraspinal muscles and soft tissues are unremarkable.     Impression:     Multilevel lumbar spondylosis most prominent at L4-5 resulting in moderate spinal canal stenosis and moderate bilateral neural foraminal narrowing.  Findings have slightly progressed in comparison to prior study dated 08/28/2018.     MRI Foot 9/12/2019:  EXAMINATION:  MRI FOOT (HINDFOOT) LEFT W W/O CONTRAST     CLINICAL HISTORY:  Foot pain, chronic, plantar fasciitis  suspected;  Pain in left foot     TECHNIQUE:  Routine MRI evaluation of the left hindfoot performed with and without the use of 10 cc of Gadavist IV contrast.     COMPARISON:  Radiograph 09/06/2019.     FINDINGS:  Tendons: There is fusiform thickening of the distal 5.4 cm of the Achilles tendon with low-grade partial-thickness interstitial tearing of the anterior distal fibers and associated peritendinitis.  Note is made of a 0.4 cm fluid filled gap within the anteromedial fibers of the distal Achilles tendon corresponding to aforementioned interstitial tear.  There is trace fluid within the peroneal tendon sheath suggesting tenosynovitis.  Posterior tibial, flexor digitorum longus, flexor hallucis longus and extensor tendons are normal.     Ligaments: There is slight irregularity at the fibular insertion of the anterior talofibular ligament, presumably related to remote trauma.  Posterior talofibular, calcaneofibular, deltoid, spring and visualized portions of the Lisfranc ligament are normal.  Bifurcate, dorsal talonavicular and dorsal calcaneocuboid ligaments are intact.     Bones: No fractures.  No avascular necrosis.  No marrow infiltrative process.     Cartilage: No osteochondral lesions.  No partial or full-thickness cartilage defects.  No imaging findings to suggest a tarsal coalition.     Muscles: No accessory muscles.  Muscle bulk is preserved.     Miscellaneous: There is thickening and increased signal intensity of the central cord of the plantar fascia with associated edema of the calcaneus, flexor digitorum brevis and adjacent heel pad.  No full-thickness tear or retraction.  Tarsal tunnel is normal.  Sinus tarsi demonstrates slight increased signal intensity with grossly intact cervical and interosseous ligaments.  There is trace fluid within the retrocalcaneal bursa.     Impression:     1. MR imaging findings consistent with plantar fasciitis noting thickening and increased signal intensity within the  central cord of the plantar fascia with associated edema of the flexor digitorum brevis, calcaneus and adjacent heel pad.  2. Distal Achilles tendinosis with low-grade interstitial tearing and associated peritendinitis.  Trace fluid within the retrocalcaneal bursa suggests bursitis.  3. Trace peroneal tenosynovitis.  4. Irregularity at the fibular insertion of the anterior talofibular ligament suggesting sequela of a remote injury.  No full-thickness tear.             Assessment:     Encounter Diagnoses   Name Primary?    Chronic pain disorder     Chronic pain syndrome     Lumbar spondylosis     Morbid obesity     DDD (degenerative disc disease), lumbar     Lumbar radiculopathy Yes           Plan:     Yanni was seen today for follow-up, back pain and arm pain.    Diagnoses and all orders for this visit:    Lumbar radiculopathy  -     Procedure Order to Pain Management; Future  -     Ambulatory referral/consult to [facility]; Future    Chronic pain disorder    Chronic pain syndrome    Lumbar spondylosis    Morbid obesity    DDD (degenerative disc disease), lumbar      Vertrice Efren Kaiser continues to deal with chronic pain that impacts her function and QOL despite medical/interventional tx. She completed Functional Restoration with benefit.     - Scheduled bilateral L4/L5 TF NAKITA  - Referral to health back program  - Follow-up with bariatric medicine for weight loss  - RTC 2 weeks after procedure    The above plan and management options were discussed at length with patient. Patient is in agreement with the above and verbalized understanding. It will be communicated with the referring physician via electronic record, fax, or mail.      Avelino Joshi MD  12/05/2022    I have reviewed and concur with the resident's history, physical, assessment, and plan.  I have personally interviewed and examined the patient at bedside.  See below addendum for my evaluation and additional findings.    Lawrence  MD Tomas

## 2022-12-05 NOTE — PROGRESS NOTES
Chronic patient Established Note (Follow up visit)      Chief Complaint: chronic Pain    Consulted by: Dr. Marin (Pain Management)    Interval history 12/5/2022:  Radha returns to clinic today for all body and lower back pain. Today, the pain is unchanged from previous visit and described as 9/10. The low back, left shoulder, left elbow, right knee, and right ankle are painful, in that order of decreasing severity. The pain is described as aching. Endorsed a fall about 2 weeks ago but denied trauma. She needs a refill for the Lyrica and Flexeril. Taking percocet 7.5mg BID-TID from Dr. Negron. She has not been doing the functional restoration program exercises.  Endorsed numbness in the feet. Patient has completed aquatic therapy previously with moderate relief but does not want to repeat at this time.    Interval History 4/12/2022: Patient presents to clinic today for follow up of chronic low back pain. Patient is s/p Bilateral L3, L4 and L5 Lumbar Medial Branch Block on 2/18/22.  She endorses 50% pain relief and improvement in function following the injection that lasted 1 day. Pain remains stable in low back and radiates down posterolateral LLE to the foot. She endorses chronic intermittent numbness/tingling in bilateral lower extremities. She feels some weakness in LLE that is chronic. Pain is sharp/dull/achy. Pain is currently rated 7/10. She continues to take lyrica 50mg BID, flexeril and percocet 7.5/325mg (uses it about twice a day prn). She was previously doing aquatherapy and stopped due to a yeast infection. She plans to start the [facility] and has an orientation tomorrow.      Interval Hx 2/14/22  Patient returns today in virtual follow up. She is s/p bilateral L4/5 TFESI 1/28/22 which she reports only gave her 2 days of relief and her pain returned. In the interim she has continued with aqua therapy and was seen in [facility]. They plan to continue PT once her aqua therapy is done. She reports continued low  back pain, worse with prolonged standing and walking and better with rest. She reports she has more back pain than leg pain at this time. She also reports bilateral hip pain. She continues with Lyrica 50 mg BID Flexeril 190 gm TID and Percocet 7.5/325 daily prn. Denies any new numbness tingling weakness b/b incontinence.      Interval History 1/18/2022:  Yanni Kaiser returns to clinic for follow up of low back pain. She reports resolution dermotomal right sided mid back pain with dermatomal distribution suspicious for shingles despite not having a rash s/p valtrex 1000mg BID for 7 days. In regard to low back pain, she had a L4-L5 TFESI scheduled that was cancelled due to recent abx for chronic sinusitis. She continues to have chronic left sided low back with with radiation down to the lateral thigh and into the anterior and posterior leg and into the plantar aspects of the feet.  She reports low back pain leg heaviness with walking that is releved with leaning on an object. Sitting relieves leg heaviness but not low back pain. Treats sx with topical voltaren gel, lyrica 50mg TID, and percocet 7.5-325 TID (#90 dispensed 1/17/22). Pain meds help but pain persists. She does report urge type incontinence as well as overflow type leakage between voids including overnight. She reports occasional significant LE weakness. Denies saddle anesthesia or ataxia.     Interval History 12/20/2021:  Yanni Kaiser presents to the clinmiic for a follow-up appointment for mid-back pain. Since the last visit, Yanni Kaiser states the pain has been persistant. She states that her biggest reason she is here today is for right-sided mid-back pain that started about 2 weeks ago as an itching in her right mid-back that wrapped around her right flank. It started as an itching, progressed to a burning after a couple of days, and is now more of an achy pain. She does not recall if she had a rash or not. She went to  urgent care about a week ago and was prescribed Valtrex, but has not started taking it yet because she is nervous about taking a new medicine. She continues to have low back pain that radiates down the legs, mostly in the LLE.      Interval History 9/29/2020:  The patient is a virtual appointment today for follow-up of back and leg pain.  She is now status post L4/5 interlaminar epidural steroid injection.  She also reports limited benefit from this procedure.  Her pain is mainly located across the lower back with intermittent radiation into the legs.  Her back pain is currently her greatest complaint.  It bothers her the most when she stands for a long time in changes positions.  She is still in physical therapy but states that she missed her appointment today due to her nephew being ill.  Her pain today is 10/10.     Interval History 8/25/2020:  The patient is here today for follow-up of chronic lower back pain.  The pain radiates down the posterolateral aspect of both legs to her shin.  She had limited benefit with bilateral L4/5 TF NAKITA in the past.  We previously discussed IL NAKITA which she would like to schedule.  Since her previous encounter, she has started physical therapy which she thinks is helpful.  She has increased her home activity regimen as previously discussed.  Her pain today is 9/10     Interval History 7/14/2020:  The patient presents for follow up of lower back and leg pain.  She is s/p bilateral L4/5 TF NAKITA on 6/24/20 with limited benefit of leg pain.  She denies any respiratory changes such as fever, cough, or shortness of breath.  She continues to report pain that starts across the lower back with radiation down the side of both legs.  She has associated numbness and tingling to both legs.  Her pain worsens when she walks and when she is active.  She says that she had to PT appointments did not have follow-up appointments made.  The previous note indicates that she is to continue with PT she  is seeing Snow Collazo for chronic pain as occasion.  She says that she has been taking Lyrica at night that is making her wake up in the middle of the night with dizziness.  She stops it as instructed by PCP.  She has an appointment with her neurologist tomorrow.  Her pain today is 10/10.     Previous Encounter:  Yanni Kaiser presents to the clinic for the evaluation of lower back pain. The pain started 3 years ago following no specific incident and symptoms have been worsening.The pain is located in the lumbar area and radiates to the left leg mostly but also into the right leg. Non-specific dermatomal distribution.  The pain is described as aching, sharp and shooting and is rated as 10/10. The pain is rated with a score of  10/10 on the BEST day and a score of 10/10 on the WORST day.  Symptoms interfere with daily activity and sleeping. The pain is exacerbated by Sitting, Walking, Lifting and Getting out of bed/chair.  The pain is mitigated by heat, ice, medications and physical therapy. She reports spending 4 hours per day reclining. The patient reports 0 hours of uninterrupted sleep per night.     Physical Therapy/Home Exercise: yes   Physical Therapy:  Scheduled to start today.     Pain Medications:         Currently taking: lyrica, tizanidine, effexor, topamax, imitrex    Has tried in the past: norco, percocet, tramadol, ibuprofen, mobic, neurontin, flexeril, voltaren gel    Interventional Procedures:  6/4/20 Bilateral L4/5 TF NAKITA- only a few days of pain relief   2/18/2022: Bilateral L3, L4 and L5 Lumbar Medial Branch Block  50% pain relief and improvement in function     Relevant Surgeries:  none    Affecting sleep?  Yes    Affecting daily activities?  Yes    Affecting mood?  Yes     Work status:  Disabled 2/2 seizures/pseudotumor cerebri     Pt does smoke cigarettes daily. Denies hx of substance abuse.       Past Medical History:   Diagnosis Date    Allergy     Anemia     Anxiety     Asthma      Back pain     Chronic bronchitis     Chronic obstructive pulmonary disease, unspecified COPD type 2022    Cigarette smoker     DDD (degenerative disc disease), lumbar 2020    Depression     Diabetes mellitus with peripheral circulatory disorder 2022    Diabetes with neurologic complications     DUB (dysfunctional uterine bleeding) 10/16/2018    GERD (gastroesophageal reflux disease)     High cholesterol     Hyperprolactinemia     Hypertension     Influenza A 2020    Neuromuscular disorder     Obese body habitus     Obesity     Pseudotumor cerebri     Renal manifestation of secondary diabetes mellitus     Respiratory failure     Seizures     Simple endometrial hyperplasia     Sleep apnea     Smoker     Tobacco dependence     Urinary incontinence        Past Surgical History:   Procedure Laterality Date    ANTROSTOMY OF MAXILLARY SINUS AT INFERIOR NASAL MEATUS  10/22/2021    Procedure: MAXILLARY ANTROSTOMY, AT INFERIOR NASAL MEATUS;  Surgeon: John Prado III, MD;  Location: [facility] OR;  Service: ENT;;    BREAST CYST ASPIRATION       SECTION      X 1    CHOLECYSTECTOMY      COLONOSCOPY      COLONOSCOPY N/A 2019    Procedure: COLONOSCOPY;  Surgeon: Jagruti Sweeney MD;  Location: [facility] ENDO (2ND FLR);  Service: Endoscopy;  Laterality: N/A;  BMI is 63/gastroparesis/3 days full liquid diet 1 day clear liquid see telephone encounter by Ciara Brown     EPIDURAL STEROID INJECTION N/A 2020    Procedure: INJECTION, STEROID, EPIDURAL, L5-S1 IL;  Surgeon: Lawrence Marin MD;  Location: [facility] PAIN MGT;  Service: Pain Management;  Laterality: N/A;    ESOPHAGEAL MANOMETRY WITH MEASUREMENT OF IMPEDANCE N/A 2019    Procedure: MANOMETRY-ESOPHAGEAL-WITH IMPEDANCE;  Surgeon: Jagruti Sweeney MD;  Location: [facility] ENDO (2ND FLR);  Service: Endoscopy;  Laterality: N/A;  Hold Narcotics x 1 days   Hold TCA x 1 days  Propofol only. No fentanyl or benzodiazepine during sedation. If  additional sedation needed, discuss with Dr. Sweeney.  10/29 - pt confirmed appt    ESOPHAGOGASTRODUODENOSCOPY N/A 01/14/2019    Procedure: EGD (ESOPHAGOGASTRODUODENOSCOPY);  Surgeon: Jagruti Sweeney MD;  Location: [facility] VANESSA (2ND FLR);  Service: Endoscopy;  Laterality: N/A;  BMI is 63/gastroparesis/3 days full liquid diet 1 day clear liquid see telephone encounter by Ciara Brown     ESOPHAGOGASTRODUODENOSCOPY N/A 11/05/2019    Procedure: ESOPHAGOGASTRODUODENOSCOPY (EGD);  Surgeon: Jagruti Sweeney MD;  Location: [facility] ENDO (2ND FLR);  Service: Endoscopy;  Laterality: N/A;  EGD with EndoFlip /+/- Botox  2nd floor for gastroparesis/BMI 63 **367lbs**  Bariatric Stretcher needed  Manometry probe to be placed endoscopically during EGD procedure  Due to change in protocol, Full liquid diet for 3 days/ 1 day of clear liquids  Hi    ESOPHAGOGASTRODUODENOSCOPY N/A 12/14/2020    Procedure: ESOPHAGOGASTRODUODENOSCOPY (EGD) with BRAVO;  Surgeon: Jagruti Sweeney MD;  Location: [facility] V[facility]ESSA (2ND FLR);  Service: Endoscopy;  Laterality: N/A;  with Dilation  2nd floor due to BMI 56.20 (327 lbs) and gastroparesis  3 days full liquid diet and 1 day clears    ESOPHAGOGASTRODUODENOSCOPY N/A 04/15/2021    Procedure: ESOPHAGOGASTRODUODENOSCOPY (EGD);  Surgeon: Jagruti Sweeney MD;  Location: [facility] (2ND FLR);  Service: Endoscopy;  Laterality: N/A;  Endoflip  2nd floor-gastroparesis, BMI 57.18, bariatric stretcher  full liquid diet x3 days, clear liquid diet x1 day prior to procedure  propofol only  covid test 4/12 primary care, instructions emailed-[facility]    FOOT FRACTURE SURGERY Left     FUNCTIONAL ENDOSCOPIC SINUS SURGERY (FESS) USING COMPUTER-ASSISTED NAVIGATION Bilateral 10/22/2021    Procedure: FESS, USING COMPUTER-ASSISTED NAVIGATION;  Surgeon: John Prado III, MD;  Location: [facility];  Service: ENT;  Laterality: Bilateral;  FOLLOW DR PRADO ANESTHESIA PROTOCAL / pt will stay 23 hour post surgery for SHARATH observation     HYSTEROSCOPY WITH DILATION AND CURETTAGE OF UTERUS N/A 10/16/2018    KJB    INJECTION OF ANESTHETIC AGENT AROUND NERVE Bilateral 10/23/2020    Procedure: BLOCK, NERVE  bilateral L3,4,5 MBB;  Surgeon: Lawrence Marin MD;  Location: [facility] PAIN MGT;  Service: Pain Management;  Laterality: Bilateral;  bilateral L3,4,5 MBB   consent needed    INJECTION OF ANESTHETIC AGENT AROUND NERVE Bilateral 02/18/2022    Procedure: BLOCK, NERVE, L3-L4-L5 MEDIAL BRANCH;  Surgeon: Lawrence Marin MD;  Location: [facility] PAIN MGT;  Service: Pain Management;  Laterality: Bilateral;    PLANTAR FASCIOTOMY Left 11/18/2019    Procedure: FASCIOTOMY, PLANTAR;  Surgeon: Valentino Orourke DPM;  Location: [address];  Service: Podiatry;  Laterality: Left;    RETINAL LASER PROCEDURE Left     SINUS SURGERY      SPHENOIDECTOMY Bilateral 10/22/2021    Procedure: SPHENOIDECTOMY;  Surgeon: John Prado III, MD;  Location: [facility] OR;  Service: ENT;  Laterality: Bilateral;    TRANSFORAMINAL EPIDURAL INJECTION OF STEROID Bilateral 06/24/2020    Procedure: INJECTION, STEROID, EPIDURAL, TRANSFORAMINAL APPROACH;  Surgeon: Lawrence Marin MD;  Location: [facility] PAIN MGT;  Service: Pain Management;  Laterality: Bilateral;    TRANSFORAMINAL EPIDURAL INJECTION OF STEROID Bilateral 01/28/2022    Procedure: INJECTION, STEROID, EPIDURAL, TRANSFORAMINAL APPROACH  Bilateral TFESI L4/L5      NEEDS CONSENT;  Surgeon: Lawrence Marin MD;  Location: [facility] PAIN MGT;  Service: Pain Management;  Laterality: Bilateral;    UPPER GASTROINTESTINAL ENDOSCOPY         Review of patient's allergies indicates:   Allergen Reactions    Gabapentin Other (See Comments)     Makes her twitch       Current Outpatient Medications   Medication Sig Dispense Refill    albuterol (PROVENTIL/VENTOLIN HFA) 90 mcg/actuation inhaler Inhale 2 puffs into the lungs every 6 (six) hours as needed for Wheezing or Shortness of Breath. Rescue 54 g 6    albuterol-ipratropium (DUO-NEB) 2.5 mg-0.5 mg/3 mL  nebulizer solution Take 3 mLs by nebulization every 6 (six) hours as needed for Wheezing. Rescue 75 mL 2    alcohol swabs (BD ALCOHOL SWABS) PadM Apply 1 each topically 2 (two) times a day. 200 each 4    amitriptyline (ELAVIL) 25 MG tablet Take 1 tablet (25 mg total) by mouth nightly as needed for Insomnia. 30 tablet 0    ammonium lactate 12 % Crea Apply twice daily to affected parts both feet as needed. 140 g 11    azelastine (ASTELIN) 137 mcg (0.1 %) nasal spray 2 sprays (274 mcg total) by Nasal route 2 (two) times daily. 60 mL 11    blood sugar diagnostic Strp 1 each by Misc.(Non-Drug; Combo Route) route 4 (four) times daily before meals and nightly. ACCU CHEK ELVIA PLUS METER 200 each 3    blood-glucose meter (ACCU-CHEK ELVIA PLUS METER) Misc TEST FOUR TIMES DAILY BEFORE MEALS AND EVERY NIGHT 90 each 1    cetirizine (ZYRTEC) 10 MG tablet Take 1 tablet (10 mg total) by mouth daily as needed for Allergies (itching). 30 tablet 11    clobetasoL (TEMOVATE) 0.05 % external solution Apply topically 2 (two) times daily. Prn scalp itch or rash 60 mL 1    cyclobenzaprine (FLEXERIL) 10 MG tablet       diclofenac sodium (VOLTAREN) 1 % Gel Apply 2 g topically 3 (three) times daily. Apply to painful joints 300 g 2    empagliflozin (JARDIANCE) 10 mg tablet Take 1 tablet (10 mg total) by mouth once daily. 90 tablet 1    fluticasone furoate-vilanteroL (BREO ELLIPTA) 200-25 mcg/dose DsDv diskus inhaler Inhale 1 puff into the lungs once daily. Controller 1 each 5    fluticasone propionate (FLONASE) 50 mcg/actuation nasal spray 2 sprays (100 mcg total) by Each Nostril route 2 (two) times a day. 32 g 11    lancets (ACCU-CHEK FASTCLIX LANCET DRUM) Misc 1 Device by Misc.(Non-Drug; Combo Route) route 2 (two) times a day. 200 each 4    lancets (ACCU-CHEK SOFTCLIX LANCETS) Misc 1 Device by Misc.(Non-Drug; Combo Route) route 4 (four) times daily. 200 each 3    levocetirizine (XYZAL) 5 MG tablet Take 1 tablet (5 mg total) by mouth every  evening. 90 tablet 3    lidocaine (LIDODERM) 5 % Place 1 patch onto the skin once daily. Remove & Discard patch within 12 hours or as directed by MD 15 patch 0    LIDOcaine HCL 2% (XYLOCAINE) 2 % jelly Apply topically as needed. Apply topically once nightly to affected part of foot/feet. 30 mL 2    LINZESS 145 mcg Cap capsule Take 145 mcg by mouth once daily.      losartan (COZAAR) 100 MG tablet TAKE 1 TABLET(100 MG) BY MOUTH EVERY DAY 90 tablet 0    montelukast (SINGULAIR) 10 mg tablet Take 1 tablet (10 mg total) by mouth every evening. 90 tablet 3    nicotine (NICODERM CQ) 21 mg/24 hr Place 1 patch onto the skin once daily. 28 patch 0    nystatin-triamcinolone (MYCOLOG) ointment Apply topically 2 (two) times daily. 60 g 2    ondansetron (ZOFRAN) 8 MG tablet Take 1 tablet (8 mg total) by mouth every 8 (eight) hours as needed for Nausea. 90 tablet 11    oxyCODONE-acetaminophen (PERCOCET) 7.5-325 mg per tablet Take 1 tablet by mouth 3 (three) times daily as needed for Pain. 90 tablet 0    pantoprazole (PROTONIX) 40 MG tablet Take 1 tablet (40 mg total) by mouth 2 (two) times daily. 180 tablet 0    pregabalin (LYRICA) 50 MG capsule Take 1 capsule (50 mg total) by mouth 3 (three) times daily. 90 capsule 3    QUEtiapine (SEROQUEL) 25 MG Tab Take 1 tablet (25 mg total) by mouth once daily. 90 tablet 0    semaglutide 2 mg/dose (8 mg/3 mL) PnIj Inject 2 mg into the skin every 7 days. 9 mL 0    sulindac (CLINORIL) 200 MG Tab Take 1 tablet (200 mg total) by mouth 2 (two) times daily. 30 tablet 0    topiramate (TOPAMAX) 50 MG tablet Take 2 tablets (100 mg total) by mouth every evening. 180 tablet 2    triamcinolone acetonide 0.1% (KENALOG) 0.1 % cream Apply topically 2 (two) times daily. Prn focally for rash spots on forehead and legs.Stop using steroid topical when skin is smooth and non itchy.  Do not treat dark or red coloring. 45 g 0    venlafaxine (EFFEXOR-XR) 75 MG 24 hr capsule Take 1 capsule (75 mg total) by mouth  once daily. NO FURTHER REFILL WITHOUT APT 90 capsule 1    EPINEPHrine (EPIPEN) 0.3 mg/0.3 mL AtIn Inject 0.3 mLs (0.3 mg total) into the muscle once. for 1 dose 2 each 1     Current Facility-Administered Medications   Medication Dose Route Frequency Provider Last Rate Last Admin    albuterol inhaler 2 puff  2 puff Inhalation Q20 Min PRN Carlyle Gordillo MD        diphenhydrAMINE injection 25 mg  25 mg Intravenous Once PRN Carlyle Gordillo MD        EPINEPHrine (EPIPEN) 0.3 mg/0.3 mL pen injection 0.3 mg  0.3 mg Intramuscular PRN Carlyle Gordillo MD        methylPREDNISolone sodium succinate injection 40 mg  40 mg Intravenous Once PRN Carlyle Gordillo MD        ondansetron disintegrating tablet 4 mg  4 mg Oral Once PRN Carlyle Gordillo MD        sodium chloride 0.9% 500 mL flush bag   Intravenous PRN Carlyle Gordillo MD        sodium chloride 0.9% flush 10 mL  10 mL Intravenous PRN Carlyle Gordillo MD           Family History   Problem Relation Age of Onset    Hypertension Mother     Diabetes Mother     Colon cancer Mother 50    Colon polyps Mother     Cataracts Mother     Heart disease Father     COPD Father     Heart attack Father     Hypertension Father     Ulcers Father     Cataracts Father     Glaucoma Father     No Known Problems Brother     Diabetes Daughter         prediabetes    Diabetes Daughter         prediabetic    Hypertension Daughter     Obesity Daughter     No Known Problems Daughter     No Known Problems Son     No Known Problems Maternal Aunt     No Known Problems Maternal Uncle     No Known Problems Paternal Aunt     No Known Problems Paternal Uncle     Cancer Maternal Grandmother     Breast cancer Maternal Grandmother     Colon cancer Maternal Grandmother     Colon polyps Maternal Grandmother     No Known Problems Maternal Grandfather     No Known Problems Paternal Grandmother     No Known Problems Paternal Grandfather     Celiac disease Neg Hx     Cirrhosis Neg Hx     Crohn's disease Neg Hx      "Cystic fibrosis Neg Hx     Esophageal cancer Neg Hx     Hemochromatosis Neg Hx     Inflammatory bowel disease Neg Hx     Irritable bowel syndrome Neg Hx     Liver cancer Neg Hx     Liver disease Neg Hx     Rectal cancer Neg Hx     Stomach cancer Neg Hx     Ulcerative colitis Neg Hx     Jonnie's disease Neg Hx     Tuberculosis Neg Hx     Lymphoma Neg Hx     Scleroderma Neg Hx     Rheum arthritis Neg Hx     Melanoma Neg Hx     Multiple sclerosis Neg Hx     Psoriasis Neg Hx     Lupus Neg Hx     Skin cancer Neg Hx     Amblyopia Neg Hx     Blindness Neg Hx     Macular degeneration Neg Hx     Retinal detachment Neg Hx     Strabismus Neg Hx     Stroke Neg Hx     Thyroid disease Neg Hx     Allergic rhinitis Neg Hx     Allergies Neg Hx     Angioedema Neg Hx     Asthma Neg Hx     Eczema Neg Hx     Immunodeficiency Neg Hx     Rhinitis Neg Hx     Urticaria Neg Hx     Atopy Neg Hx        Social History     Socioeconomic History    Marital status:     Number of children: 4   Occupational History    Occupation: disable   Tobacco Use    Smoking status: Every Day     Packs/day: 0.25     Years: 27.00     Pack years: 6.75     Types: Cigarettes     Start date: 1/1/1992     Passive exposure: Current    Smokeless tobacco: Never    Tobacco comments:     "1/2 pack per every 2 days or so"           10/21/22 patient says its less than half a pack now. Has been cutting back.   Substance and Sexual Activity    Alcohol use: Yes     Alcohol/week: 1.0 standard drink     Types: 1 Shots of liquor per week     Comment: occasionally    Drug use: No    Sexual activity: Yes     Partners: Male     Birth control/protection: None     Comment:      Social Determinants of Health     Financial Resource Strain: High Risk    Difficulty of Paying Living Expenses: Very hard   Food Insecurity: Food Insecurity Present    Worried About Running Out of Food in the Last Year: Often true    Ran Out of Food in the Last Year: Sometimes true   Transportation " Needs: Unmet Transportation Needs    Lack of Transportation (Medical): Yes    Lack of Transportation (Non-Medical): Yes   Physical Activity: Inactive    Days of Exercise per Week: 0 days    Minutes of Exercise per Session: 0 min   Stress: Stress Concern Present    Feeling of Stress : To some extent   Social Connections: Unknown    Frequency of Communication with Friends and Family: Three times a week    Frequency of Social Gatherings with Friends and Family: Once a week    Active Member of Clubs or Organizations: Yes    Attends Club or Organization Meetings: More than 4 times per year    Marital Status:    Housing Stability: High Risk    Unable to Pay for Housing in the Last Year: Yes    Number of Places Lived in the Last Year: 1    Unstable Housing in the Last Year: No              Objective:        GEN: fully alert, oriented. Well developed, well nourished. No acute distress.   HEENT: No trauma. Mucous membranes moist. Nares patent bilaterally.  PSYCH: Normal affect. Thought content appropriate.  CHEST: Breathing symmetric. No audible wheezing.  SKIN: Warm, pink, dry. No rash on exposed areas.   EXT: No cyanosis, clubbing, or edema. No color change or changes in nail or hair growth  Gait: pt able to walk independently without assistive device or support.     Lumbar Spine Exam:       Inspection: No erythema, bruising.       Palpation: (+) TTP of lumbar paraspinals        ROM:  Limited in flexion, extension, lateral bending.       (+) Facet loading bilaterally      (-) Straight Leg Raise bilaterally      (-) ARCHIE bilaterally  Hip Exam:      Inspection: No gross deformity or apparent leg length discrepancy      Palpation: (-) TTP to bilateral greater trochanteric bursas .       ROM: Limitation or pain in internal rotation, external rotation   Neurologic Exam:     Alert. Speech is fluent and appropriate.     Strength:  4-5/5 throughout bilateral lower extremities     Sensation:  Grossly intact to light touch  in bilateral lower extremities     Reflexes: 2+ in b/l patella, achilles     Tone: No abnormality appreciated in bilateral lower extremities     No Clonus     Downgoing toes on plantar stimulation     (-) Salazar bilaterally         Imaging:     MRI Lumbar Spine 5/22/2020:   COMPARISON:  MRI lumbar spine 08/28/2018     FINDINGS:  Straightening of the normal lumbar lordosis.  Mild grade 1 retrolisthesis L5 on S1     Vertebral body heights are maintained.  Several T1 and T2 hyperintense lesions within the L2 and L3 vertebral body favored to represent osseous hemangiomas.  Otherwise normal marrow signal.  No fracture.     Multilevel partial disc desiccation throughout the lumbar spine.  Posterior annular fissuring at L2-L3.     Distal spinal cord is unremarkable.  Conus terminates at L1-L2.     Degenerative findings:     T12-L1: No disc herniation, spinal canal stenosis, or neural foraminal narrowing.     L1-L2: No disc herniation, spinal canal stenosis, or neural foraminal narrowing.     L2-L3: No disc herniation, spinal canal stenosis or neural foraminal narrowing.     L3-L4: Mild circumferential disc bulge without spinal canal stenosis or neural foraminal narrowing.     L4-L5: Circumferential disc bulge, facet arthropathy, and ligamentum flavum hypertrophy resulting in moderate spinal canal stenosis and moderate bilateral neural foraminal narrowing.     L5-S1: Circumferential disc bulge and facet arthropathy without spinal canal stenosis or neural foraminal narrowing.     Paraspinal muscles and soft tissues are unremarkable.     Impression:     Multilevel lumbar spondylosis most prominent at L4-5 resulting in moderate spinal canal stenosis and moderate bilateral neural foraminal narrowing.  Findings have slightly progressed in comparison to prior study dated 08/28/2018.     MRI Foot 9/12/2019:  EXAMINATION:  MRI FOOT (HINDFOOT) LEFT W W/O CONTRAST     CLINICAL HISTORY:  Foot pain, chronic, plantar fasciitis  suspected;  Pain in left foot     TECHNIQUE:  Routine MRI evaluation of the left hindfoot performed with and without the use of 10 cc of Gadavist IV contrast.     COMPARISON:  Radiograph 09/06/2019.     FINDINGS:  Tendons: There is fusiform thickening of the distal 5.4 cm of the Achilles tendon with low-grade partial-thickness interstitial tearing of the anterior distal fibers and associated peritendinitis.  Note is made of a 0.4 cm fluid filled gap within the anteromedial fibers of the distal Achilles tendon corresponding to aforementioned interstitial tear.  There is trace fluid within the peroneal tendon sheath suggesting tenosynovitis.  Posterior tibial, flexor digitorum longus, flexor hallucis longus and extensor tendons are normal.     Ligaments: There is slight irregularity at the fibular insertion of the anterior talofibular ligament, presumably related to remote trauma.  Posterior talofibular, calcaneofibular, deltoid, spring and visualized portions of the Lisfranc ligament are normal.  Bifurcate, dorsal talonavicular and dorsal calcaneocuboid ligaments are intact.     Bones: No fractures.  No avascular necrosis.  No marrow infiltrative process.     Cartilage: No osteochondral lesions.  No partial or full-thickness cartilage defects.  No imaging findings to suggest a tarsal coalition.     Muscles: No accessory muscles.  Muscle bulk is preserved.     Miscellaneous: There is thickening and increased signal intensity of the central cord of the plantar fascia with associated edema of the calcaneus, flexor digitorum brevis and adjacent heel pad.  No full-thickness tear or retraction.  Tarsal tunnel is normal.  Sinus tarsi demonstrates slight increased signal intensity with grossly intact cervical and interosseous ligaments.  There is trace fluid within the retrocalcaneal bursa.     Impression:     1. MR imaging findings consistent with plantar fasciitis noting thickening and increased signal intensity within the  central cord of the plantar fascia with associated edema of the flexor digitorum brevis, calcaneus and adjacent heel pad.  2. Distal Achilles tendinosis with low-grade interstitial tearing and associated peritendinitis.  Trace fluid within the retrocalcaneal bursa suggests bursitis.  3. Trace peroneal tenosynovitis.  4. Irregularity at the fibular insertion of the anterior talofibular ligament suggesting sequela of a remote injury.  No full-thickness tear.             Assessment:     Encounter Diagnoses   Name Primary?    Chronic pain disorder     Chronic pain syndrome     Lumbar spondylosis     Morbid obesity     DDD (degenerative disc disease), lumbar     Lumbar radiculopathy Yes           Plan:     Yanni was seen today for follow-up, back pain and arm pain.    Diagnoses and all orders for this visit:    Lumbar radiculopathy  -     Procedure Order to Pain Management; Future  -     Ambulatory referral/consult to [facility]; Future    Chronic pain disorder    Chronic pain syndrome    Lumbar spondylosis    Morbid obesity    DDD (degenerative disc disease), lumbar      Vertrice Efren Kaiser continues to deal with chronic pain that impacts her function and QOL despite medical/interventional tx. She completed Functional Restoration with benefit.     - Scheduled bilateral L4/L5 TF NAKITA  - Referral to health back program  - Follow-up with bariatric medicine for weight loss  - RTC 2 weeks after procedure    The above plan and management options were discussed at length with patient. Patient is in agreement with the above and verbalized understanding. It will be communicated with the referring physician via electronic record, fax, or mail.      Avelino Joshi MD  12/05/2022    I have reviewed and concur with the resident's history, physical, assessment, and plan.  I have personally interviewed and examined the patient at bedside.  See below addendum for my evaluation and additional findings.    Lawrence  MD Tomas

## 2022-12-07 ENCOUNTER — PATIENT MESSAGE (OUTPATIENT)
Dept: BARIATRICS | Facility: CLINIC | Age: 52
End: 2022-12-07
Payer: MEDICARE

## 2022-12-09 ENCOUNTER — PATIENT MESSAGE (OUTPATIENT)
Dept: PAIN MEDICINE | Facility: OTHER | Age: 52
End: 2022-12-09
Payer: MEDICARE

## 2022-12-20 ENCOUNTER — LAB VISIT (OUTPATIENT)
Dept: LAB | Facility: HOSPITAL | Age: 52
End: 2022-12-20
Attending: STUDENT IN AN ORGANIZED HEALTH CARE EDUCATION/TRAINING PROGRAM
Payer: MEDICARE

## 2022-12-20 DIAGNOSIS — N18.31 TYPE 2 DIABETES MELLITUS WITH STAGE 3A CHRONIC KIDNEY DISEASE, WITH LONG-TERM CURRENT USE OF INSULIN: ICD-10-CM

## 2022-12-20 DIAGNOSIS — Z79.4 TYPE 2 DIABETES MELLITUS WITH STAGE 3A CHRONIC KIDNEY DISEASE, WITH LONG-TERM CURRENT USE OF INSULIN: ICD-10-CM

## 2022-12-20 DIAGNOSIS — E11.22 TYPE 2 DIABETES MELLITUS WITH STAGE 3A CHRONIC KIDNEY DISEASE, WITH LONG-TERM CURRENT USE OF INSULIN: ICD-10-CM

## 2022-12-20 LAB
ANION GAP SERPL CALC-SCNC: 7 MMOL/L (ref 8–16)
BUN SERPL-MCNC: 14 MG/DL (ref 6–20)
CALCIUM SERPL-MCNC: 8.5 MG/DL (ref 8.7–10.5)
CHLORIDE SERPL-SCNC: 110 MMOL/L (ref 95–110)
CO2 SERPL-SCNC: 25 MMOL/L (ref 23–29)
CREAT SERPL-MCNC: 1.2 MG/DL (ref 0.5–1.4)
EST. GFR  (NO RACE VARIABLE): 55 ML/MIN/1.73 M^2
ESTIMATED AVG GLUCOSE: 120 MG/DL (ref 68–131)
GLUCOSE SERPL-MCNC: 99 MG/DL (ref 70–110)
HBA1C MFR BLD: 5.8 % (ref 4–5.6)
POTASSIUM SERPL-SCNC: 4.2 MMOL/L (ref 3.5–5.1)
SODIUM SERPL-SCNC: 142 MMOL/L (ref 136–145)

## 2022-12-20 PROCEDURE — 36415 COLL VENOUS BLD VENIPUNCTURE: CPT | Mod: HCNC | Performed by: NURSE PRACTITIONER

## 2022-12-20 PROCEDURE — 83036 HEMOGLOBIN GLYCOSYLATED A1C: CPT | Mod: HCNC | Performed by: NURSE PRACTITIONER

## 2022-12-20 PROCEDURE — 80048 BASIC METABOLIC PNL TOTAL CA: CPT | Mod: HCNC | Performed by: NURSE PRACTITIONER

## 2022-12-23 ENCOUNTER — HOSPITAL ENCOUNTER (OUTPATIENT)
Facility: OTHER | Age: 52
Discharge: HOME | End: 2022-12-23
Attending: ANESTHESIOLOGY | Admitting: ANESTHESIOLOGY
Payer: MEDICARE

## 2022-12-23 ENCOUNTER — OFFICE VISIT (OUTPATIENT)
Dept: FAMILY MEDICINE | Facility: CLINIC | Age: 52
End: 2022-12-23
Payer: MEDICARE

## 2022-12-23 VITALS
WEIGHT: 293 LBS | DIASTOLIC BLOOD PRESSURE: 109 MMHG | SYSTOLIC BLOOD PRESSURE: 170 MMHG | HEART RATE: 80 BPM | OXYGEN SATURATION: 91 % | HEIGHT: 64 IN | RESPIRATION RATE: 14 BRPM | TEMPERATURE: 98 F | BODY MASS INDEX: 50.02 KG/M2

## 2022-12-23 VITALS
RESPIRATION RATE: 18 BRPM | HEIGHT: 64 IN | SYSTOLIC BLOOD PRESSURE: 130 MMHG | BODY MASS INDEX: 50.02 KG/M2 | WEIGHT: 293 LBS | DIASTOLIC BLOOD PRESSURE: 88 MMHG | HEART RATE: 79 BPM | OXYGEN SATURATION: 97 % | TEMPERATURE: 98 F

## 2022-12-23 DIAGNOSIS — I10 BENIGN ESSENTIAL HTN: ICD-10-CM

## 2022-12-23 DIAGNOSIS — M17.0 PRIMARY OSTEOARTHRITIS OF BOTH KNEES: ICD-10-CM

## 2022-12-23 DIAGNOSIS — G89.29 CHRONIC PAIN: ICD-10-CM

## 2022-12-23 DIAGNOSIS — J30.9 ALLERGIC RHINITIS, UNSPECIFIED SEASONALITY, UNSPECIFIED TRIGGER: ICD-10-CM

## 2022-12-23 DIAGNOSIS — M54.16 LUMBAR RADICULOPATHY: Primary | ICD-10-CM

## 2022-12-23 DIAGNOSIS — E11.22 TYPE 2 DIABETES MELLITUS WITH STAGE 3A CHRONIC KIDNEY DISEASE, WITH LONG-TERM CURRENT USE OF INSULIN: Primary | ICD-10-CM

## 2022-12-23 DIAGNOSIS — F17.200 TOBACCO DEPENDENCY: ICD-10-CM

## 2022-12-23 DIAGNOSIS — F33.42 RECURRENT MAJOR DEPRESSIVE DISORDER, IN FULL REMISSION: ICD-10-CM

## 2022-12-23 DIAGNOSIS — J45.909 ASTHMA, UNSPECIFIED ASTHMA SEVERITY, UNSPECIFIED WHETHER COMPLICATED, UNSPECIFIED WHETHER PERSISTENT: ICD-10-CM

## 2022-12-23 DIAGNOSIS — L84 CORN OR CALLUS: ICD-10-CM

## 2022-12-23 DIAGNOSIS — K21.9 GERD WITHOUT ESOPHAGITIS: ICD-10-CM

## 2022-12-23 DIAGNOSIS — Z79.4 TYPE 2 DIABETES MELLITUS WITH STAGE 3A CHRONIC KIDNEY DISEASE, WITH LONG-TERM CURRENT USE OF INSULIN: Primary | ICD-10-CM

## 2022-12-23 DIAGNOSIS — F33.3 SEVERE EPISODE OF RECURRENT MAJOR DEPRESSIVE DISORDER, WITH PSYCHOTIC FEATURES: ICD-10-CM

## 2022-12-23 DIAGNOSIS — G57.52 TARSAL TUNNEL SYNDROME OF LEFT SIDE: ICD-10-CM

## 2022-12-23 DIAGNOSIS — M51.36 DDD (DEGENERATIVE DISC DISEASE), LUMBAR: ICD-10-CM

## 2022-12-23 DIAGNOSIS — E11.51 DIABETES MELLITUS WITH PERIPHERAL CIRCULATORY DISORDER: ICD-10-CM

## 2022-12-23 DIAGNOSIS — K59.00 CONSTIPATION, UNSPECIFIED CONSTIPATION TYPE: ICD-10-CM

## 2022-12-23 DIAGNOSIS — F45.8 PRURITUS SINE MATERIA: ICD-10-CM

## 2022-12-23 DIAGNOSIS — N18.31 TYPE 2 DIABETES MELLITUS WITH STAGE 3A CHRONIC KIDNEY DISEASE, WITH LONG-TERM CURRENT USE OF INSULIN: Primary | ICD-10-CM

## 2022-12-23 DIAGNOSIS — G43.009 MIGRAINE WITHOUT AURA AND WITHOUT STATUS MIGRAINOSUS, NOT INTRACTABLE: ICD-10-CM

## 2022-12-23 LAB — POCT GLUCOSE: 79 MG/DL (ref 70–110)

## 2022-12-23 PROCEDURE — 1160F RVW MEDS BY RX/DR IN RCRD: CPT | Mod: HCNC,CPTII,S$GLB, | Performed by: NURSE PRACTITIONER

## 2022-12-23 PROCEDURE — 25000003 PHARM REV CODE 250: Mod: HCNC | Performed by: STUDENT IN AN ORGANIZED HEALTH CARE EDUCATION/TRAINING PROGRAM

## 2022-12-23 PROCEDURE — 63600175 PHARM REV CODE 636 W HCPCS: Mod: HCNC | Performed by: ANESTHESIOLOGY

## 2022-12-23 PROCEDURE — 3075F SYST BP GE 130 - 139MM HG: CPT | Mod: HCNC,CPTII,S$GLB, | Performed by: NURSE PRACTITIONER

## 2022-12-23 PROCEDURE — 25500020 PHARM REV CODE 255: Mod: HCNC | Performed by: ANESTHESIOLOGY

## 2022-12-23 PROCEDURE — 99214 OFFICE O/P EST MOD 30 MIN: CPT | Mod: HCNC,S$GLB,, | Performed by: NURSE PRACTITIONER

## 2022-12-23 PROCEDURE — 4010F ACE/ARB THERAPY RXD/TAKEN: CPT | Mod: HCNC,CPTII,S$GLB, | Performed by: NURSE PRACTITIONER

## 2022-12-23 PROCEDURE — 3044F HG A1C LEVEL LT 7.0%: CPT | Mod: HCNC,CPTII,S$GLB, | Performed by: NURSE PRACTITIONER

## 2022-12-23 PROCEDURE — 3066F NEPHROPATHY DOC TX: CPT | Mod: HCNC,CPTII,S$GLB, | Performed by: NURSE PRACTITIONER

## 2022-12-23 PROCEDURE — 82947 ASSAY GLUCOSE BLOOD QUANT: CPT | Mod: HCNC | Performed by: ANESTHESIOLOGY

## 2022-12-23 PROCEDURE — 1160F PR REVIEW ALL MEDS BY PRESCRIBER/CLIN PHARMACIST DOCUMENTED: ICD-10-PCS | Mod: HCNC,CPTII,S$GLB, | Performed by: NURSE PRACTITIONER

## 2022-12-23 PROCEDURE — 99214 PR OFFICE/OUTPT VISIT, EST, LEVL IV, 30-39 MIN: ICD-10-PCS | Mod: HCNC,S$GLB,, | Performed by: NURSE PRACTITIONER

## 2022-12-23 PROCEDURE — 3061F PR NEG MICROALBUMINURIA RESULT DOCUMENTED/REVIEW: ICD-10-PCS | Mod: HCNC,CPTII,S$GLB, | Performed by: NURSE PRACTITIONER

## 2022-12-23 PROCEDURE — 3075F PR MOST RECENT SYSTOLIC BLOOD PRESS GE 130-139MM HG: ICD-10-PCS | Mod: HCNC,CPTII,S$GLB, | Performed by: NURSE PRACTITIONER

## 2022-12-23 PROCEDURE — 1159F PR MEDICATION LIST DOCUMENTED IN MEDICAL RECORD: ICD-10-PCS | Mod: HCNC,CPTII,S$GLB, | Performed by: NURSE PRACTITIONER

## 2022-12-23 PROCEDURE — 99999 PR PBB SHADOW E&M-EST. PATIENT-LVL V: CPT | Mod: PBBFAC,HCNC,, | Performed by: NURSE PRACTITIONER

## 2022-12-23 PROCEDURE — 3066F PR DOCUMENTATION OF TREATMENT FOR NEPHROPATHY: ICD-10-PCS | Mod: HCNC,CPTII,S$GLB, | Performed by: NURSE PRACTITIONER

## 2022-12-23 PROCEDURE — 64483 PR EPIDURAL INJ, ANES/STEROID, TRANSFORAMINAL, LUMB/SACR, SNGL LEVL: ICD-10-PCS | Mod: 50,HCNC,, | Performed by: ANESTHESIOLOGY

## 2022-12-23 PROCEDURE — 1159F MED LIST DOCD IN RCRD: CPT | Mod: HCNC,CPTII,S$GLB, | Performed by: NURSE PRACTITIONER

## 2022-12-23 PROCEDURE — 64483 NJX AA&/STRD TFRM EPI L/S 1: CPT | Mod: 50,HCNC | Performed by: ANESTHESIOLOGY

## 2022-12-23 PROCEDURE — 99499 RISK ADDL DX/OHS AUDIT: ICD-10-PCS | Mod: S$GLB,,, | Performed by: NURSE PRACTITIONER

## 2022-12-23 PROCEDURE — 4010F PR ACE/ARB THEARPY RXD/TAKEN: ICD-10-PCS | Mod: HCNC,CPTII,S$GLB, | Performed by: NURSE PRACTITIONER

## 2022-12-23 PROCEDURE — 25000003 PHARM REV CODE 250: Mod: HCNC | Performed by: ANESTHESIOLOGY

## 2022-12-23 PROCEDURE — 99499 UNLISTED E&M SERVICE: CPT | Mod: S$GLB,,, | Performed by: NURSE PRACTITIONER

## 2022-12-23 PROCEDURE — 3008F PR BODY MASS INDEX (BMI) DOCUMENTED: ICD-10-PCS | Mod: HCNC,CPTII,S$GLB, | Performed by: NURSE PRACTITIONER

## 2022-12-23 PROCEDURE — 3008F BODY MASS INDEX DOCD: CPT | Mod: HCNC,CPTII,S$GLB, | Performed by: NURSE PRACTITIONER

## 2022-12-23 PROCEDURE — 3079F DIAST BP 80-89 MM HG: CPT | Mod: HCNC,CPTII,S$GLB, | Performed by: NURSE PRACTITIONER

## 2022-12-23 PROCEDURE — 3079F PR MOST RECENT DIASTOLIC BLOOD PRESSURE 80-89 MM HG: ICD-10-PCS | Mod: HCNC,CPTII,S$GLB, | Performed by: NURSE PRACTITIONER

## 2022-12-23 PROCEDURE — 99999 PR PBB SHADOW E&M-EST. PATIENT-LVL V: ICD-10-PCS | Mod: PBBFAC,HCNC,, | Performed by: NURSE PRACTITIONER

## 2022-12-23 PROCEDURE — 3061F NEG MICROALBUMINURIA REV: CPT | Mod: HCNC,CPTII,S$GLB, | Performed by: NURSE PRACTITIONER

## 2022-12-23 PROCEDURE — 3044F PR MOST RECENT HEMOGLOBIN A1C LEVEL <7.0%: ICD-10-PCS | Mod: HCNC,CPTII,S$GLB, | Performed by: NURSE PRACTITIONER

## 2022-12-23 PROCEDURE — 64483 NJX AA&/STRD TFRM EPI L/S 1: CPT | Mod: 50,HCNC,, | Performed by: ANESTHESIOLOGY

## 2022-12-23 RX ORDER — MONTELUKAST SODIUM 10 MG/1
10 TABLET ORAL NIGHTLY
Qty: 90 TABLET | Refills: 1 | Status: SHIPPED | OUTPATIENT
Start: 2022-12-23 | End: 2023-05-12 | Stop reason: SDUPTHER

## 2022-12-23 RX ORDER — LEVOCETIRIZINE DIHYDROCHLORIDE 5 MG/1
5 TABLET, FILM COATED ORAL NIGHTLY
Qty: 90 TABLET | Refills: 1 | Status: SHIPPED | OUTPATIENT
Start: 2022-12-23 | End: 2023-03-29 | Stop reason: SDUPTHER

## 2022-12-23 RX ORDER — FENTANYL CITRATE 50 UG/ML
INJECTION, SOLUTION INTRAMUSCULAR; INTRAVENOUS
Status: DISCONTINUED | OUTPATIENT
Start: 2022-12-23 | End: 2022-12-23 | Stop reason: HOSPADM

## 2022-12-23 RX ORDER — LIDOCAINE HYDROCHLORIDE 20 MG/ML
INJECTION, SOLUTION INFILTRATION; PERINEURAL
Status: DISCONTINUED | OUTPATIENT
Start: 2022-12-23 | End: 2022-12-23 | Stop reason: HOSPADM

## 2022-12-23 RX ORDER — AMITRIPTYLINE HYDROCHLORIDE 25 MG/1
25 TABLET, FILM COATED ORAL NIGHTLY PRN
Qty: 90 TABLET | Refills: 1 | Status: SHIPPED | OUTPATIENT
Start: 2022-12-23 | End: 2023-02-13 | Stop reason: SDUPTHER

## 2022-12-23 RX ORDER — LOSARTAN POTASSIUM AND HYDROCHLOROTHIAZIDE 25; 100 MG/1; MG/1
1 TABLET ORAL DAILY
Qty: 90 TABLET | Refills: 3 | Status: SHIPPED | OUTPATIENT
Start: 2022-12-23 | End: 2023-03-02

## 2022-12-23 RX ORDER — CETIRIZINE HYDROCHLORIDE 10 MG/1
10 TABLET ORAL DAILY PRN
Qty: 90 TABLET | Refills: 1 | Status: SHIPPED | OUTPATIENT
Start: 2022-12-23 | End: 2023-03-29 | Stop reason: SDUPTHER

## 2022-12-23 RX ORDER — VENLAFAXINE HYDROCHLORIDE 75 MG/1
75 CAPSULE, EXTENDED RELEASE ORAL DAILY
Qty: 90 CAPSULE | Refills: 1 | Status: SHIPPED | OUTPATIENT
Start: 2022-12-23 | End: 2023-04-12 | Stop reason: SDUPTHER

## 2022-12-23 RX ORDER — FLUTICASONE FUROATE AND VILANTEROL 200; 25 UG/1; UG/1
1 POWDER RESPIRATORY (INHALATION) DAILY
Qty: 1 EACH | Refills: 5 | Status: SHIPPED | OUTPATIENT
Start: 2022-12-23 | End: 2023-01-26 | Stop reason: ALTCHOICE

## 2022-12-23 RX ORDER — SULINDAC 200 MG/1
200 TABLET ORAL 2 TIMES DAILY
Qty: 30 TABLET | Refills: 0 | Status: CANCELLED | OUTPATIENT
Start: 2022-12-23

## 2022-12-23 RX ORDER — PREGABALIN 50 MG/1
50 CAPSULE ORAL 3 TIMES DAILY
Qty: 90 CAPSULE | Refills: 3 | Status: CANCELLED | OUTPATIENT
Start: 2022-12-23 | End: 2023-01-22

## 2022-12-23 RX ORDER — PANTOPRAZOLE SODIUM 40 MG/1
40 TABLET, DELAYED RELEASE ORAL 2 TIMES DAILY
Qty: 180 TABLET | Refills: 1 | Status: SHIPPED | OUTPATIENT
Start: 2022-12-23 | End: 2023-09-18

## 2022-12-23 RX ORDER — DEXAMETHASONE SODIUM PHOSPHATE 10 MG/ML
INJECTION INTRAMUSCULAR; INTRAVENOUS
Status: DISCONTINUED | OUTPATIENT
Start: 2022-12-23 | End: 2022-12-23 | Stop reason: HOSPADM

## 2022-12-23 RX ORDER — IBUPROFEN 200 MG
1 TABLET ORAL DAILY
Qty: 28 PATCH | Refills: 0 | Status: SHIPPED | OUTPATIENT
Start: 2022-12-23

## 2022-12-23 RX ORDER — SODIUM CHLORIDE 9 MG/ML
500 INJECTION, SOLUTION INTRAVENOUS CONTINUOUS
Status: DISCONTINUED | OUTPATIENT
Start: 2022-12-23 | End: 2022-12-23 | Stop reason: HOSPADM

## 2022-12-23 RX ORDER — MIDAZOLAM HYDROCHLORIDE 1 MG/ML
INJECTION INTRAMUSCULAR; INTRAVENOUS
Status: DISCONTINUED | OUTPATIENT
Start: 2022-12-23 | End: 2022-12-23 | Stop reason: HOSPADM

## 2022-12-23 RX ORDER — QUETIAPINE FUMARATE 25 MG/1
25 TABLET, FILM COATED ORAL DAILY
Qty: 90 TABLET | Refills: 1 | Status: SHIPPED | OUTPATIENT
Start: 2022-12-23 | End: 2023-03-29 | Stop reason: SDUPTHER

## 2022-12-23 RX ORDER — LINACLOTIDE 145 UG/1
145 CAPSULE, GELATIN COATED ORAL DAILY
Qty: 90 CAPSULE | Refills: 1 | Status: SHIPPED | OUTPATIENT
Start: 2022-12-23

## 2022-12-23 RX ORDER — TOPIRAMATE 50 MG/1
100 TABLET, FILM COATED ORAL NIGHTLY
Qty: 180 TABLET | Refills: 1 | Status: SHIPPED | OUTPATIENT
Start: 2022-12-23 | End: 2023-02-03 | Stop reason: SDUPTHER

## 2022-12-23 RX ORDER — LOSARTAN POTASSIUM 100 MG/1
100 TABLET ORAL DAILY
Qty: 90 TABLET | Refills: 0 | Status: CANCELLED | OUTPATIENT
Start: 2022-12-23

## 2022-12-23 RX ORDER — LIDOCAINE HYDROCHLORIDE 10 MG/ML
INJECTION, SOLUTION EPIDURAL; INFILTRATION; INTRACAUDAL; PERINEURAL
Status: DISCONTINUED | OUTPATIENT
Start: 2022-12-23 | End: 2022-12-23 | Stop reason: HOSPADM

## 2022-12-23 NOTE — PROGRESS NOTES
History of Present Illness   Yanni Kaiser is a 52 y.o. woman with medical history as listed below who presents today for routine follow-up, T2DM and HTN. She did have labs prior to our visit with reduction in hemoglobin A1c from 6.4 to 5.8. She is taking medications as prescribed without perceived SE. She does not monitor her glucose at home but denies symptoms of hypoglycemia. Complaints today: None. Pertinent negatives as listed in ROS. We will address HM today.    Past Medical History:   Diagnosis Date    Allergy     Anemia     Anxiety     Asthma     Back pain     Chronic bronchitis     Chronic obstructive pulmonary disease, unspecified COPD type 2022    Cigarette smoker     DDD (degenerative disc disease), lumbar 2020    Depression     Diabetes mellitus with peripheral circulatory disorder 2022    Diabetes with neurologic complications     DUB (dysfunctional uterine bleeding) 10/16/2018    GERD (gastroesophageal reflux disease)     High cholesterol     Hyperprolactinemia     Hypertension     Influenza A 2020    Neuromuscular disorder     Obese body habitus     Obesity     Pseudotumor cerebri     Renal manifestation of secondary diabetes mellitus     Respiratory failure     Seizures     Simple endometrial hyperplasia     Sleep apnea     Smoker     Tobacco dependence     Urinary incontinence        Past Surgical History:   Procedure Laterality Date    ANTROSTOMY OF MAXILLARY SINUS AT INFERIOR NASAL MEATUS  10/22/2021    Procedure: MAXILLARY ANTROSTOMY, AT INFERIOR NASAL MEATUS;  Surgeon: John Prado III, MD;  Location: [facility];  Service: ENT;;    BREAST CYST ASPIRATION       SECTION      X 1    CHOLECYSTECTOMY      COLONOSCOPY      COLONOSCOPY N/A 2019    Procedure: COLONOSCOPY;  Surgeon: Jagruti Sweeney MD;  Location: [address]);  Service: Endoscopy;  Laterality: N/A;  BMI is 63/gastroparesis/3 days full liquid diet 1 day clear liquid see telephone  encounter by Ciara Pinto [facility]    EPIDURAL STEROID INJECTION N/A 09/11/2020    Procedure: INJECTION, STEROID, EPIDURAL, L5-S1 IL;  Surgeon: Lawrence Marin MD;  Location: [facility]IN [facility]GT;  Service: Pain Management;  Laterality: N/A;    ESOPHAGEAL MANOMETRY WITH MEASUREMENT OF IMPEDANCE N/A 11/05/2019    Procedure: MANOMETRY-ESOPHAGEAL-WITH IMPEDANCE;  Surgeon: Jagruti Sweeney MD;  Location: [facility] (2ND FLR);  Service: Endoscopy;  Laterality: N/A;  Hold Narcotics x 1 days   Hold TCA x 1 days  Propofol only. No fentanyl or benzodiazepine during sedation. If additional sedation needed, discuss with Dr. Sweeney.  10/29 - pt confirmed appt    ESOPHAGOGASTRODUODENOSCOPY N/A 01/14/2019    Procedure: EGD (ESOPHAGOGASTRODUODENOSCOPY);  Surgeon: Jagruti Sweeney MD;  Location: [facility] (2ND FLR);  Service: Endoscopy;  Laterality: N/A;  BMI is 63/gastroparesis/3 days full liquid diet 1 day clear liquid see telephone encounter by Ciara Pinto [facility]    ESOPHAGOGASTRODUODENOSCOPY N/A 11/05/2019    Procedure: ESOPHAGOGASTRODUODENOSCOPY (EGD);  Surgeon: Jagruti Sweeney MD;  Location: [facility] (2ND FLR);  Service: Endoscopy;  Laterality: N/A;  EGD with EndoFlip /+/- Botox  2nd floor for gastroparesis/BMI 63 **367lbs**  Bariatric Stretcher needed  Manometry probe to be placed endoscopically during EGD procedure  Due to change in protocol, Full liquid diet for 3 days/ 1 day of clear liquids  Hi    ESOPHAGOGASTRODUODENOSCOPY N/A 12/14/2020    Procedure: ESOPHAGOGASTRODUODENOSCOPY (EGD) with BRAVO;  Surgeon: Jagruti Sweeney MD;  Location: [facility] (2ND FLR);  Service: Endoscopy;  Laterality: N/A;  with Dilation  2nd floor due to BMI 56.20 (327 lbs) and gastroparesis  3 days full liquid diet and 1 day clears    ESOPHAGOGASTRODUODENOSCOPY N/A 04/15/2021    Procedure: ESOPHAGOGASTRODUODENOSCOPY (EGD);  Surgeon: Jagruti Sweeney MD;  Location: [facility] (2ND FLR);  Service: Endoscopy;  Laterality: N/A;  Endoflip  2nd  floor-gastroparesis, BMI 57.18, bariatric stretcher  full liquid diet x3 days, clear liquid diet x1 day prior to procedure  propofol only  covid test 4/12 primary care, instructions emailed-[facility]    FOOT FRACTURE SURGERY Left     FUNCTIONAL ENDOSCOPIC SINUS SURGERY (FESS) USING COMPUTER-ASSISTED NAVIGATION Bilateral 10/22/2021    Procedure: FESS, USING COMPUTER-ASSISTED NAVIGATION;  Surgeon: John Prado III, MD;  Location: [facility];  Service: ENT;  Laterality: Bilateral;  FOLLOW DR PRADO ANESTHESIA PROTOCAL / pt will stay 23 hour post surgery for SHARATH observation    HYSTEROSCOPY WITH DILATION AND CURETTAGE OF UTERUS N/A 10/16/2018    KJB    INJECTION OF ANESTHETIC AGENT AROUND NERVE Bilateral 10/23/2020    Procedure: BLOCK, NERVE  bilateral L3,4,5 MBB;  Surgeon: Lawrence Marin MD;  Location: [facility];  Service: Pain Management;  Laterality: Bilateral;  bilateral L3,4,5 MBB   consent needed    INJECTION OF ANESTHETIC AGENT AROUND NERVE Bilateral 02/18/2022    Procedure: BLOCK, NERVE, L3-L4-L5 MEDIAL BRANCH;  Surgeon: Lawrence Marin MD;  Location: [facility]GT;  Service: Pain Management;  Laterality: Bilateral;    PLANTAR FASCIOTOMY Left 11/18/2019    Procedure: FASCIOTOMY, PLANTAR;  Surgeon: Valentino Orourke DPM;  Location: [address];  Service: Podiatry;  Laterality: Left;    RETINAL LASER PROCEDURE Left     SINUS SURGERY      SPHENOIDECTOMY Bilateral 10/22/2021    Procedure: SPHENOIDECTOMY;  Surgeon: John Prado III, MD;  Location: [facility];  Service: ENT;  Laterality: Bilateral;    TRANSFORAMINAL EPIDURAL INJECTION OF STEROID Bilateral 06/24/2020    Procedure: INJECTION, STEROID, EPIDURAL, TRANSFORAMINAL APPROACH;  Surgeon: Lawrence Marin MD;  Location: [facility]GT;  Service: Pain Management;  Laterality: Bilateral;    TRANSFORAMINAL EPIDURAL INJECTION OF STEROID Bilateral 01/28/2022    Procedure: INJECTION, STEROID, EPIDURAL, TRANSFORAMINAL APPROACH  Bilateral TFESI L4/L5      NEEDS  "CONSENT;  Surgeon: Lawrence Marin MD;  Location: [facility];  Service: Pain Management;  Laterality: Bilateral;    UPPER GASTROINTESTINAL ENDOSCOPY         Social History     Socioeconomic History    Marital status:     Number of children: 4   Occupational History    Occupation: disable   Tobacco Use    Smoking status: Every Day     Packs/day: 0.25     Years: 27.00     Pack years: 6.75     Types: Cigarettes     Start date: 1/1/1992     Passive exposure: Current    Smokeless tobacco: Never    Tobacco comments:     "1/2 pack per every 2 days or so"           10/21/22 patient says its less than half a pack now. Has been cutting back.   Substance and Sexual Activity    Alcohol use: Yes     Alcohol/week: 1.0 standard drink     Types: 1 Shots of liquor per week     Comment: occasionally    Drug use: No    Sexual activity: Yes     Partners: Male     Birth control/protection: None     Comment:      Social Determinants of Health     Financial Resource Strain: High Risk    Difficulty of Paying Living Expenses: Very hard   Food Insecurity: Food Insecurity Present    Worried About Running Out of Food in the Last Year: Often true    Ran Out of Food in the Last Year: Sometimes true   Transportation Needs: Unmet Transportation Needs    Lack of Transportation (Medical): Yes    Lack of Transportation (Non-Medical): Yes   Physical Activity: Inactive    Days of Exercise per Week: 0 days    Minutes of Exercise per Session: 0 min   Stress: Stress Concern Present    Feeling of Stress : To some extent   Social Connections: Unknown    Frequency of Communication with Friends and Family: Three times a week    Frequency of Social Gatherings with Friends and Family: Once a week    Active Member of Clubs or Organizations: Yes    Attends Club or Organization Meetings: More than 4 times per year    Marital Status:    Housing Stability: High Risk    Unable to Pay for Housing in the Last Year: Yes    Number of Places Lived " in the Last Year: 1    Unstable Housing in the Last Year: No       Family History   Problem Relation Age of Onset    Hypertension Mother     Diabetes Mother     Colon cancer Mother 50    Colon polyps Mother     Cataracts Mother     Heart disease Father     COPD Father     Heart attack Father     Hypertension Father     Ulcers Father     Cataracts Father     Glaucoma Father     No Known Problems Brother     Diabetes Daughter         prediabetes    Diabetes Daughter         prediabetic    Hypertension Daughter     Obesity Daughter     No Known Problems Daughter     No Known Problems Son     No Known Problems Maternal Aunt     No Known Problems Maternal Uncle     No Known Problems Paternal Aunt     No Known Problems Paternal Uncle     Cancer Maternal Grandmother     Breast cancer Maternal Grandmother     Colon cancer Maternal Grandmother     Colon polyps Maternal Grandmother     No Known Problems Maternal Grandfather     No Known Problems Paternal Grandmother     No Known Problems Paternal Grandfather     Celiac disease Neg Hx     Cirrhosis Neg Hx     Crohn's disease Neg Hx     Cystic fibrosis Neg Hx     Esophageal cancer Neg Hx     Hemochromatosis Neg Hx     Inflammatory bowel disease Neg Hx     Irritable bowel syndrome Neg Hx     Liver cancer Neg Hx     Liver disease Neg Hx     Rectal cancer Neg Hx     Stomach cancer Neg Hx     Ulcerative colitis Neg Hx     Jonnie's disease Neg Hx     Tuberculosis Neg Hx     Lymphoma Neg Hx     Scleroderma Neg Hx     Rheum arthritis Neg Hx     Melanoma Neg Hx     Multiple sclerosis Neg Hx     Psoriasis Neg Hx     Lupus Neg Hx     Skin cancer Neg Hx     Amblyopia Neg Hx     Blindness Neg Hx     Macular degeneration Neg Hx     Retinal detachment Neg Hx     Strabismus Neg Hx     Stroke Neg Hx     Thyroid disease Neg Hx     Allergic rhinitis Neg Hx     Allergies Neg Hx     Angioedema Neg Hx     Asthma Neg Hx     Eczema Neg Hx     Immunodeficiency Neg Hx     Rhinitis Neg Hx     Urticaria  "Neg Hx     Atopy Neg Hx        Review of Systems  Review of Systems   Constitutional:  Negative for malaise/fatigue and weight loss.   Eyes:  Negative for blurred vision and double vision.   Respiratory:  Negative for shortness of breath.    Cardiovascular:  Negative for chest pain, palpitations, orthopnea, claudication, leg swelling and PND.   Gastrointestinal:  Negative for blood in stool and melena.   Genitourinary:  Negative for flank pain and hematuria.   Musculoskeletal:  Positive for back pain, joint pain and neck pain.   Skin:  Negative for rash.   Neurological:  Negative for dizziness, loss of consciousness and headaches.   Endo/Heme/Allergies:  Negative for polydipsia.     A complete review of systems was otherwise negative.    Physical Exam  /88   Pulse 79   Temp 98 °F (36.7 °C) (Oral)   Resp 18   Ht 5' 4" (1.626 m)   Wt (!) 164.8 kg (363 lb 6.9 oz)   LMP 08/17/2019   SpO2 97%   BMI 62.38 kg/m²   General appearance: alert, appears stated age, cooperative, and no distress  Neck: no carotid bruit, no JVD, supple, symmetrical, trachea midline, and thyroid not enlarged, symmetric, no tenderness/mass/nodules  Lungs: clear to auscultation bilaterally  Heart: regular rate and rhythm, S1, S2 normal, no murmur, click, rub or gallop  Extremities: extremities normal, atraumatic, no cyanosis or edema  Pulses: 2+ and symmetric  Skin: Skin color, texture, turgor normal. No rashes or lesions  Neurologic: Grossly normal    Assessment/Plan  Type 2 diabetes mellitus with stage 3a chronic kidney disease, with long-term current use of insulin  The current medical regimen is effective;  continue present plan and medications.  Labs and follow-up with PCP in 6 months.  -     empagliflozin (JARDIANCE) 10 mg tablet; Take 1 tablet (10 mg total) by mouth once daily.  Dispense: 90 tablet; Refill: 1  -     Comprehensive Metabolic Panel; Future; Expected date: 12/23/2022  -     Lipid Panel; Future; Expected date: " 12/23/2022  -     Hemoglobin A1C; Future; Expected date: 12/23/2022    Diabetes mellitus with peripheral circulatory disorder  The current medical regimen is effective;  continue present plan and medications.  Labs and follow-up with PCP in 6 months.  -     amitriptyline (ELAVIL) 25 MG tablet; Take 1 tablet (25 mg total) by mouth nightly as needed for Insomnia.  Dispense: 90 tablet; Refill: 1    Benign essential HTN  The current medical regimen is effective;  continue present plan and medications.  Labs and follow-up with PCP in 6 months.  -     losartan-hydrochlorothiazide 100-25 mg (HYZAAR) 100-25 mg per tablet; Take 1 tablet by mouth once daily.  Dispense: 90 tablet; Refill: 3    Severe episode of recurrent major depressive disorder, with psychotic features  The current medical regimen is effective;  continue present plan and medications.  Labs and follow-up with PCP in 6 months.  -     QUEtiapine (SEROQUEL) 25 MG Tab; Take 1 tablet (25 mg total) by mouth once daily.  Dispense: 90 tablet; Refill: 1    Recurrent major depressive disorder, in full remission  The current medical regimen is effective;  continue present plan and medications.  Labs and follow-up with PCP in 6 months.  -     venlafaxine (EFFEXOR-XR) 75 MG 24 hr capsule; Take 1 capsule (75 mg total) by mouth once daily.  Dispense: 90 capsule; Refill: 1    Primary osteoarthritis of both knees  The current medical regimen is effective;  continue present plan and medications.  Labs and follow-up with PCP in 6 months.    Migraine without aura and without status migrainosus, not intractable  The current medical regimen is effective;  continue present plan and medications.  Labs and follow-up with PCP in 6 months.  -     topiramate (TOPAMAX) 50 MG tablet; Take 2 tablets (100 mg total) by mouth every evening.  Dispense: 180 tablet; Refill: 1  -     venlafaxine (EFFEXOR-XR) 75 MG 24 hr capsule; Take 1 capsule (75 mg total) by mouth once daily.  Dispense: 90  capsule; Refill: 1    Asthma, unspecified asthma severity, unspecified whether complicated, unspecified whether persistent  The current medical regimen is effective;  continue present plan and medications.  Labs and follow-up with PCP in 6 months.  -     montelukast (SINGULAIR) 10 mg tablet; Take 1 tablet (10 mg total) by mouth every evening.  Dispense: 90 tablet; Refill: 1  -     fluticasone furoate-vilanteroL (BREO ELLIPTA) 200-25 mcg/dose DsDv diskus inhaler; Inhale 1 puff into the lungs once daily. Controller  Dispense: 1 each; Refill: 5  -     CBC Auto Differential; Future; Expected date: 12/23/2022    Allergic rhinitis, unspecified seasonality, unspecified trigger  The current medical regimen is effective;  continue present plan and medications.  Labs and follow-up with PCP in 6 months.  -     montelukast (SINGULAIR) 10 mg tablet; Take 1 tablet (10 mg total) by mouth every evening.  Dispense: 90 tablet; Refill: 1  -     cetirizine (ZYRTEC) 10 MG tablet; Take 1 tablet (10 mg total) by mouth daily as needed for Allergies (itching).  Dispense: 90 tablet; Refill: 1    Corn or callus  The current medical regimen is effective;  continue present plan and medications.  Labs and follow-up with PCP in 6 months.  -     amitriptyline (ELAVIL) 25 MG tablet; Take 1 tablet (25 mg total) by mouth nightly as needed for Insomnia.  Dispense: 90 tablet; Refill: 1    Tarsal tunnel syndrome of left side  The current medical regimen is effective;  continue present plan and medications.  Labs and follow-up with PCP in 6 months.  -     amitriptyline (ELAVIL) 25 MG tablet; Take 1 tablet (25 mg total) by mouth nightly as needed for Insomnia.  Dispense: 90 tablet; Refill: 1    GERD without esophagitis  The current medical regimen is effective;  continue present plan and medications.  Labs and follow-up with PCP in 6 months.  -     pantoprazole (PROTONIX) 40 MG tablet; Take 1 tablet (40 mg total) by mouth 2 (two) times daily.  Dispense:  180 tablet; Refill: 1    Constipation, unspecified constipation type  The current medical regimen is effective;  continue present plan and medications.  Labs and follow-up with PCP in 6 months.  -     LINZESS 145 mcg Cap capsule; Take 1 capsule (145 mcg total) by mouth once daily.  Dispense: 90 capsule; Refill: 1    Pruritus sine materia  The current medical regimen is effective;  continue present plan and medications.  Labs and follow-up with PCP in 6 months.  -     levocetirizine (XYZAL) 5 MG tablet; Take 1 tablet (5 mg total) by mouth every evening.  Dispense: 90 tablet; Refill: 1    Tobacco dependency  The current medical regimen is effective;  continue present plan and medications.  Continue working to goal of cessation.  Labs and follow-up with PCP in 6 months.  -     nicotine (NICODERM CQ) 21 mg/24 hr; Place 1 patch onto the skin once daily.  Dispense: 28 patch; Refill: 0    Patient has verbalized understanding and is in agreement with plan of care.    Follow up in about 6 months (around 6/23/2023) for labs and follow-up.

## 2022-12-23 NOTE — OP NOTE
Lumbar Transforaminal Epidural Steroid Injection under Fluoroscopic Guidance    The procedure, risks, benefits, and options were discussed with the patient. There are no contraindications to the procedure. The patent expressed understanding and agreed to the procedure. Informed written consent was obtained prior to the start of the procedure and can be found in the patient's chart.    PATIENT NAME: Mrs. Yanni Kaiser   MRN: [id number]     DATE OF PROCEDURE: 12/23/2022    PROCEDURE:  Bilateral  L4/5 Lumbar Transforaminal Epidural Steroid Injection under Fluoroscopic Guidance    PRE-OP DIAGNOSIS: Lumbar radiculopathy [M54.16] Lumbar radiculopathy [M54.16]    POST-OP DIAGNOSIS: Same    PHYSICIAN: Lawrence Marin MD    ASSISTANTS:  Jewel Carson MD    MEDICATIONS INJECTED: Preservative-free Decadron 10mg with 5cc of Lidocaine 1% MPF     LOCAL ANESTHETIC INJECTED: Xylocaine 2%     SEDATION: Versed 2mg and Fentanyl 100mcg                                                                                                                                                                                     Conscious sedation ordered by M.D. Patient re-evaluation prior to administration of conscious sedation. No changes noted in patient's status from initial evaluation. The patient's vital signs were monitored by RN and patient remained hemodynamically stable throughout the procedure.    Event Time In   Sedation Start 1335   Sedation End 1355       ESTIMATED BLOOD LOSS: None    COMPLICATIONS: None    TECHNIQUE: Time-out was performed to identify the patient and procedure to be performed. With the patient laying in a prone position, the surgical area was prepped and draped in the usual sterile fashion using ChloraPrep and a fenestrated drape.The levels were determined under fluoroscopy guidance. Skin anesthesia was achieved by injecting Lidocaine 2% over the injection sites. The transforaminal spaces were then approached with a  22 gauge, 7 inch spinal quinke needle that was introduced under fluoroscopic guidance in the AP and Lateral views. Once the needle tip was in the area of the transforaminal space, and there was no blood, CSF or paraesthesias, contrast dye Omnipaque (300mg/mL) was injected to confirm placement and there was no vascular runoff. Fluoroscopic imaging in the AP and lateral views revealed a clear outline of the spinal nerve with proximal spread of agent through the neural foramen into the epidural space. 3 mL of the medication mixture listed above was injected slowly at each site. Displacement of the radio opaque contrast after injection of the medication confirmed that the medication went into the area of the transforaminal spaces. The needles were removed and bleeding was nil. A sterile dressing was applied. No specimens collected. The patient tolerated the procedure well.       The patient was monitored after the procedure in the recovery area. They were given post-procedure and discharge instructions to follow at home. The patient was discharged in a stable condition.        Lawrence Marin MD

## 2022-12-23 NOTE — DISCHARGE SUMMARY
Discharge Note  Short Stay      SUMMARY     Admit Date: 12/23/2022    Attending Physician: Lawrence Marin      Discharge Physician: Lawrence Marin      Discharge Date: 12/23/2022 1:16 PM    Procedure(s) (LRB):  INJECTION, STEROID, EPIDURAL, TRANSFORAMINAL APPROACH BILATERAL L4/L5 CONTRAST  NEEDS CONSENT (Bilateral)    Final Diagnosis: Lumbar radiculopathy [M54.16]    Disposition: Home or self care    Patient Instructions:   Current Discharge Medication List        CONTINUE these medications which have NOT CHANGED    Details   albuterol (PROVENTIL/VENTOLIN HFA) 90 mcg/actuation inhaler Inhale 2 puffs into the lungs every 6 (six) hours as needed for Wheezing or Shortness of Breath. Rescue  Qty: 54 g, Refills: 6    Comments: **Patient requests 90 days supply**  Associated Diagnoses: Asthma, unspecified asthma severity, unspecified whether complicated, unspecified whether persistent      albuterol-ipratropium (DUO-NEB) 2.5 mg-0.5 mg/3 mL nebulizer solution Take 3 mLs by nebulization every 6 (six) hours as needed for Wheezing. Rescue  Qty: 75 mL, Refills: 2    Associated Diagnoses: Asthma, unspecified asthma severity, unspecified whether complicated, unspecified whether persistent      alcohol swabs (BD ALCOHOL SWABS) PadM Apply 1 each topically 2 (two) times a day.  Qty: 200 each, Refills: 4      amitriptyline (ELAVIL) 25 MG tablet Take 1 tablet (25 mg total) by mouth nightly as needed for Insomnia.  Qty: 90 tablet, Refills: 1    Associated Diagnoses: Diabetes mellitus with peripheral circulatory disorder; Corn or callus; Tarsal tunnel syndrome of left side      ammonium lactate 12 % Crea Apply twice daily to affected parts both feet as needed.  Qty: 140 g, Refills: 11      azelastine (ASTELIN) 137 mcg (0.1 %) nasal spray 2 sprays (274 mcg total) by Nasal route 2 (two) times daily.  Qty: 60 mL, Refills: 11    Associated Diagnoses: Allergic rhinitis, unspecified seasonality, unspecified trigger      blood sugar  diagnostic Strp 1 each by Misc.(Non-Drug; Combo Route) route 4 (four) times daily before meals and nightly. ACCU CHEK ELVIA PLUS METER  Qty: 200 each, Refills: 3      blood-glucose meter (ACCU-CHEK [place] PLUS METER) [facility] [place] DAILY BEFORE MEALS AND EVERY NIGHT  Qty: 90 each, Refills: 1    Associated Diagnoses: Type 2 diabetes mellitus with stage 3 chronic kidney disease, with long-term current use of insulin      cetirizine (ZYRTEC) 10 MG tablet Take 1 tablet (10 mg total) by mouth daily as needed for Allergies (itching).  Qty: 90 tablet, Refills: 1    Associated Diagnoses: Allergic rhinitis, unspecified seasonality, unspecified trigger      clobetasoL (TEMOVATE) 0.05 % external solution Apply topically 2 (two) times daily. Prn scalp itch or rash  Qty: 60 mL, Refills: 1    Associated Diagnoses: Scalp psoriasis      cyclobenzaprine (FLEXERIL) 10 MG tablet       diclofenac sodium (VOLTAREN) 1 % Gel Apply 2 g topically 3 (three) times daily. Apply to painful joints  Qty: 300 g, Refills: 2      empagliflozin (JARDIANCE) 10 mg tablet Take 1 tablet (10 mg total) by mouth once daily.  Qty: 90 tablet, Refills: 1    Associated Diagnoses: Type 2 diabetes mellitus with stage 3a chronic kidney disease, with long-term current use of insulin      EPINEPHrine (EPIPEN) 0.3 mg/0.3 mL AtIn Inject 0.3 mLs (0.3 mg total) into the muscle once. for 1 dose  Qty: 2 each, Refills: 1      fluticasone furoate-vilanteroL (BREO ELLIPTA) 200-25 mcg/dose DsDv diskus inhaler Inhale 1 puff into the lungs once daily. Controller  Qty: 1 each, Refills: 5    Associated Diagnoses: Asthma, unspecified asthma severity, unspecified whether complicated, unspecified whether persistent      fluticasone propionate (FLONASE) 50 mcg/actuation nasal spray 2 sprays (100 mcg total) by Each Nostril route 2 (two) times a day.  Qty: 32 g, Refills: 11    Associated Diagnoses: Allergic rhinitis, unspecified seasonality, unspecified trigger      !! lancets  (ACCU-CHEK FASTCLIX LANCET DRUM) Misc 1 Device by Misc.(Non-Drug; Combo Route) route 2 (two) times a day.  Qty: 200 each, Refills: 4      !! lancets (ACCU-CHEK SOFTCLIX LANCETS) Misc 1 Device by Misc.(Non-Drug; Combo Route) route 4 (four) times daily.  Qty: 200 each, Refills: 3      levocetirizine (XYZAL) 5 MG tablet Take 1 tablet (5 mg total) by mouth every evening.  Qty: 90 tablet, Refills: 1    Associated Diagnoses: Pruritus sine materia      lidocaine (LIDODERM) 5 % Place 1 patch onto the skin once daily. Remove & Discard patch within 12 hours or as directed by MD  Qty: 15 patch, Refills: 0      LIDOcaine HCL 2% (XYLOCAINE) 2 % jelly Apply topically as needed. Apply topically once nightly to affected part of foot/feet.  Qty: 30 mL, Refills: 2      LINZESS 145 mcg Cap capsule Take 1 capsule (145 mcg total) by mouth once daily.  Qty: 90 capsule, Refills: 1    Associated Diagnoses: Constipation, unspecified constipation type      losartan-hydrochlorothiazide 100-25 mg (HYZAAR) 100-25 mg per tablet Take 1 tablet by mouth once daily.  Qty: 90 tablet, Refills: 3    Comments: .  Associated Diagnoses: Benign essential HTN      montelukast (SINGULAIR) 10 mg tablet Take 1 tablet (10 mg total) by mouth every evening.  Qty: 90 tablet, Refills: 1    Associated Diagnoses: Asthma, unspecified asthma severity, unspecified whether complicated, unspecified whether persistent; Allergic rhinitis, unspecified seasonality, unspecified trigger      nicotine (NICODERM CQ) 21 mg/24 hr Place 1 patch onto the skin once daily.  Qty: 28 patch, Refills: 0    Comments: Patient is a member of the [employer] SCT#[id number] Please charge Rx Bin [id number] Rx Group [id number] PLEASE DISPENSE TAN COLORED PATCH ONLY  Associated Diagnoses: Tobacco dependency      nystatin-triamcinolone (MYCOLOG) ointment Apply topically 2 (two) times daily.  Qty: 60 g, Refills: 2    Associated Diagnoses: Yeast dermatitis      ondansetron (ZOFRAN) 8 MG tablet Take  1 tablet (8 mg total) by mouth every 8 (eight) hours as needed for Nausea.  Qty: 90 tablet, Refills: 11    Associated Diagnoses: Nausea and vomiting, intractability of vomiting not specified, unspecified vomiting type      oxyCODONE-acetaminophen (PERCOCET) 7.5-325 mg per tablet Take 1 tablet by mouth 3 (three) times daily as needed for Pain.  Qty: 90 tablet, Refills: 0    Comments: Medically necessary for > 7 days due to chronic pain.  Associated Diagnoses: Fibromyalgia syndrome; Chronic midline low back pain with bilateral sciatica      pantoprazole (PROTONIX) 40 MG tablet Take 1 tablet (40 mg total) by mouth 2 (two) times daily.  Qty: 180 tablet, Refills: 1    Associated Diagnoses: GERD without esophagitis      pregabalin (LYRICA) 50 MG capsule Take 1 capsule (50 mg total) by mouth 3 (three) times daily.  Qty: 90 capsule, Refills: 3      QUEtiapine (SEROQUEL) 25 MG Tab Take 1 tablet (25 mg total) by mouth once daily.  Qty: 90 tablet, Refills: 1    Associated Diagnoses: Severe episode of recurrent major depressive disorder, with psychotic features      semaglutide 2 mg/dose (8 mg/3 mL) PnIj Inject 2 mg into the skin every 7 days.  Qty: 9 mL, Refills: 0    Associated Diagnoses: Type 2 diabetes mellitus with stage 3a chronic kidney disease, with long-term current use of insulin      sulindac (CLINORIL) 200 MG Tab Take 1 tablet (200 mg total) by mouth 2 (two) times daily.  Qty: 30 tablet, Refills: 0    Associated Diagnoses: Primary osteoarthritis of both knees      topiramate (TOPAMAX) 50 MG tablet Take 2 tablets (100 mg total) by mouth every evening.  Qty: 180 tablet, Refills: 1    Associated Diagnoses: Migraine without aura and without status migrainosus, not intractable      triamcinolone acetonide 0.1% (KENALOG) 0.1 % cream Apply topically 2 (two) times daily. Prn focally for rash spots on forehead and legs.Stop using steroid topical when skin is smooth and non itchy.  Do not treat dark or red coloring.  Qty: 45  g, Refills: 0    Associated Diagnoses: Psoriasis      venlafaxine (EFFEXOR-XR) 75 MG 24 hr capsule Take 1 capsule (75 mg total) by mouth once daily.  Qty: 90 capsule, Refills: 1    Associated Diagnoses: Migraine without aura and without status migrainosus, not intractable; Recurrent major depressive disorder, in full remission       !! - Potential duplicate medications found. Please discuss with provider.              Discharge Diagnosis: Lumbar radiculopathy [M54.16]  Condition on Discharge: Stable with no complications to procedure   Diet on Discharge: Same as before.  Activity: as per instruction sheet.  Discharge to: Home with a responsible adult.  Follow up: 2-4 weeks

## 2022-12-23 NOTE — INTERVAL H&P NOTE
The patient has been examined and the H&P has been reviewed:    I concur with the findings and no changes have occurred since H&P was written.    Procedure risks, benefits and alternative options discussed and understood by patient/family.        Guillermo Garcia DO, PGY3  Department of Anesthesiology    There are no hospital problems to display for this patient.

## 2022-12-23 NOTE — DISCHARGE INSTRUCTIONS

## 2022-12-27 ENCOUNTER — PATIENT MESSAGE (OUTPATIENT)
Dept: PAIN MEDICINE | Facility: CLINIC | Age: 52
End: 2022-12-27
Payer: MEDICARE

## 2022-12-30 ENCOUNTER — HOSPITAL ENCOUNTER (OUTPATIENT)
Dept: RADIOLOGY | Facility: OTHER | Age: 52
Discharge: HOME | End: 2022-12-30
Attending: NURSE PRACTITIONER
Payer: MEDICARE

## 2022-12-30 ENCOUNTER — OFFICE VISIT (OUTPATIENT)
Dept: PAIN MEDICINE | Facility: CLINIC | Age: 52
End: 2022-12-30
Payer: MEDICARE

## 2022-12-30 ENCOUNTER — TELEPHONE (OUTPATIENT)
Dept: PAIN MEDICINE | Facility: CLINIC | Age: 52
End: 2022-12-30

## 2022-12-30 VITALS
BODY MASS INDEX: 50.02 KG/M2 | SYSTOLIC BLOOD PRESSURE: 128 MMHG | RESPIRATION RATE: 19 BRPM | WEIGHT: 293 LBS | HEART RATE: 75 BPM | DIASTOLIC BLOOD PRESSURE: 77 MMHG | TEMPERATURE: 99 F | HEIGHT: 64 IN

## 2022-12-30 DIAGNOSIS — M25.562 ACUTE PAIN OF LEFT KNEE: ICD-10-CM

## 2022-12-30 DIAGNOSIS — M25.562 ACUTE PAIN OF LEFT KNEE: Primary | ICD-10-CM

## 2022-12-30 PROCEDURE — 3078F PR MOST RECENT DIASTOLIC BLOOD PRESSURE < 80 MM HG: ICD-10-PCS | Mod: HCNC,CPTII,S$GLB, | Performed by: NURSE PRACTITIONER

## 2022-12-30 PROCEDURE — 3078F DIAST BP <80 MM HG: CPT | Mod: HCNC,CPTII,S$GLB, | Performed by: NURSE PRACTITIONER

## 2022-12-30 PROCEDURE — 3061F PR NEG MICROALBUMINURIA RESULT DOCUMENTED/REVIEW: ICD-10-PCS | Mod: HCNC,CPTII,S$GLB, | Performed by: NURSE PRACTITIONER

## 2022-12-30 PROCEDURE — 3061F NEG MICROALBUMINURIA REV: CPT | Mod: HCNC,CPTII,S$GLB, | Performed by: NURSE PRACTITIONER

## 2022-12-30 PROCEDURE — 73560 X-RAY EXAM OF KNEE 1 OR 2: CPT | Mod: TC,HCNC,FY,LT

## 2022-12-30 PROCEDURE — 3008F BODY MASS INDEX DOCD: CPT | Mod: HCNC,CPTII,S$GLB, | Performed by: NURSE PRACTITIONER

## 2022-12-30 PROCEDURE — 3074F SYST BP LT 130 MM HG: CPT | Mod: HCNC,CPTII,S$GLB, | Performed by: NURSE PRACTITIONER

## 2022-12-30 PROCEDURE — 99213 OFFICE O/P EST LOW 20 MIN: CPT | Mod: HCNC,S$GLB,, | Performed by: NURSE PRACTITIONER

## 2022-12-30 PROCEDURE — 3066F PR DOCUMENTATION OF TREATMENT FOR NEPHROPATHY: ICD-10-PCS | Mod: HCNC,CPTII,S$GLB, | Performed by: NURSE PRACTITIONER

## 2022-12-30 PROCEDURE — 4010F PR ACE/ARB THEARPY RXD/TAKEN: ICD-10-PCS | Mod: HCNC,CPTII,S$GLB, | Performed by: NURSE PRACTITIONER

## 2022-12-30 PROCEDURE — 4010F ACE/ARB THERAPY RXD/TAKEN: CPT | Mod: HCNC,CPTII,S$GLB, | Performed by: NURSE PRACTITIONER

## 2022-12-30 PROCEDURE — 1160F RVW MEDS BY RX/DR IN RCRD: CPT | Mod: HCNC,CPTII,S$GLB, | Performed by: NURSE PRACTITIONER

## 2022-12-30 PROCEDURE — 1159F MED LIST DOCD IN RCRD: CPT | Mod: HCNC,CPTII,S$GLB, | Performed by: NURSE PRACTITIONER

## 2022-12-30 PROCEDURE — 1160F PR REVIEW ALL MEDS BY PRESCRIBER/CLIN PHARMACIST DOCUMENTED: ICD-10-PCS | Mod: HCNC,CPTII,S$GLB, | Performed by: NURSE PRACTITIONER

## 2022-12-30 PROCEDURE — 99999 PR PBB SHADOW E&M-EST. PATIENT-LVL V: ICD-10-PCS | Mod: PBBFAC,HCNC,, | Performed by: NURSE PRACTITIONER

## 2022-12-30 PROCEDURE — 73560 XR KNEE 1 OR 2 VIEW LEFT: ICD-10-PCS | Mod: 26,HCNC,LT, | Performed by: RADIOLOGY

## 2022-12-30 PROCEDURE — 3066F NEPHROPATHY DOC TX: CPT | Mod: HCNC,CPTII,S$GLB, | Performed by: NURSE PRACTITIONER

## 2022-12-30 PROCEDURE — 3044F PR MOST RECENT HEMOGLOBIN A1C LEVEL <7.0%: ICD-10-PCS | Mod: HCNC,CPTII,S$GLB, | Performed by: NURSE PRACTITIONER

## 2022-12-30 PROCEDURE — 73560 X-RAY EXAM OF KNEE 1 OR 2: CPT | Mod: 26,HCNC,LT, | Performed by: RADIOLOGY

## 2022-12-30 PROCEDURE — 1159F PR MEDICATION LIST DOCUMENTED IN MEDICAL RECORD: ICD-10-PCS | Mod: HCNC,CPTII,S$GLB, | Performed by: NURSE PRACTITIONER

## 2022-12-30 PROCEDURE — 99999 PR PBB SHADOW E&M-EST. PATIENT-LVL V: CPT | Mod: PBBFAC,HCNC,, | Performed by: NURSE PRACTITIONER

## 2022-12-30 PROCEDURE — 3074F PR MOST RECENT SYSTOLIC BLOOD PRESSURE < 130 MM HG: ICD-10-PCS | Mod: HCNC,CPTII,S$GLB, | Performed by: NURSE PRACTITIONER

## 2022-12-30 PROCEDURE — 99213 PR OFFICE/OUTPT VISIT, EST, LEVL III, 20-29 MIN: ICD-10-PCS | Mod: HCNC,S$GLB,, | Performed by: NURSE PRACTITIONER

## 2022-12-30 PROCEDURE — 3044F HG A1C LEVEL LT 7.0%: CPT | Mod: HCNC,CPTII,S$GLB, | Performed by: NURSE PRACTITIONER

## 2022-12-30 PROCEDURE — 3008F PR BODY MASS INDEX (BMI) DOCUMENTED: ICD-10-PCS | Mod: HCNC,CPTII,S$GLB, | Performed by: NURSE PRACTITIONER

## 2022-12-30 NOTE — PROGRESS NOTES
Chronic Pain- Established Note (Follow up visit)    SUBJECTIVE:    Interval History 12/30/2022:  The patient returns to clinic today for follow up of low back and leg pain. She is s/p bilateral L4/5 TF NAKITA on 12/23/2022. She is unsure of relief at this time. She reports increased left knee pain over the last week. She reports swelling to the joint. She could not walk on it on Monday. She denies any injury or fall. She did not go to the ER or Urgent Care. She did try ice, heat, and rest with limited relief. She is currently taking Lyrica and Flexeril. She also takes Percocet per Dr. Negron. She denies any other health changes. Her pain today is 8/10.     Interval history 12/5/2022:  Radha returns to clinic today for all body and lower back pain. Today, the pain is unchanged from previous visit and described as 9/10. The low back, left shoulder, left elbow, right knee, and right ankle are painful, in that order of decreasing severity. The pain is described as aching. Endorsed a fall about 2 weeks ago but denied trauma. She needs a refill for the Lyrica and Flexeril. Taking percocet 7.5mg BID-TID from Dr. Negron. She has not been doing the functional restoration program exercises.  Endorsed numbness in the feet. Patient has completed aquatic therapy previously with moderate relief but does not want to repeat at this time.    Interval History 4/12/2022:   Patient presents to clinic today for follow up of chronic low back pain. Patient is s/p Bilateral L3, L4 and L5 Lumbar Medial Branch Block on 2/18/22.  She endorses 50% pain relief and improvement in function following the injection that lasted 1 day. Pain remains stable in low back and radiates down posterolateral LLE to the foot. She endorses chronic intermittent numbness/tingling in bilateral lower extremities. She feels some weakness in LLE that is chronic. Pain is sharp/dull/achy. Pain is currently rated 7/10. She continues to take lyrica 50mg BID, flexeril  and percocet 7.5/325mg (uses it about twice a day prn). She was previously doing aquatherapy and stopped due to a yeast infection. She plans to start the [facility] and has an orientation tomorrow.     Interval Hx 2/14/22  Patient returns today in virtual follow up. She is s/p bilateral L4/5 TFESI 1/28/22 which she reports only gave her 2 days of relief and her pain returned. In the interim she has continued with aqua therapy and was seen in [facility]. They plan to continue PT once her aqua therapy is done. She reports continued low back pain, worse with prolonged standing and walking and better with rest. She reports she has more back pain than leg pain at this time. She also reports bilateral hip pain. She continues with Lyrica 50 mg BID Flexeril 190 gm TID and Percocet 7.5/325 daily prn. Denies any new numbness tingling weakness b/b incontinence.     Interval History 1/18/2022:  Yanni Kaiser returns to clinic for follow up of low back pain. She reports resolution dermotomal right sided mid back pain with dermatomal distribution suspicious for shingles despite not having a rash s/p valtrex 1000mg BID for 7 days. In regard to low back pain, she had a L4-L5 TFESI scheduled that was cancelled due to recent abx for chronic sinusitis. She continues to have chronic left sided low back with with radiation down to the lateral thigh and into the anterior and posterior leg and into the plantar aspects of the feet.  She reports low back pain leg heaviness with walking that is releved with leaning on an object. Sitting relieves leg heaviness but not low back pain. Treats sx with topical voltaren gel, lyrica 50mg TID, and percocet 7.5-325 TID (#90 dispensed 1/17/22). Pain meds help but pain persists. She does report urge type incontinence as well as overflow type leakage between voids including overnight. She reports occasional significant LE weakness. Denies saddle anesthesia or ataxia.     Interval History 12/20/2021:  Yanni  Efren Kaiser presents to the [facility] for a follow-up appointment for mid-back pain. Since the last visit, Yanni Kaiser states the pain has been persistant. She states that her biggest reason she is here today is for right-sided mid-back pain that started about 2 weeks ago as an itching in her right mid-back that wrapped around her right flank. It started as an itching, progressed to a burning after a couple of days, and is now more of an achy pain. She does not recall if she had a rash or not. She went to urgent care about a week ago and was prescribed Valtrex, but has not started taking it yet because she is nervous about taking a new medicine. She continues to have low back pain that radiates down the legs, mostly in the LLE.      Interval History 9/29/2020:  The patient is a virtual appointment today for follow-up of back and leg pain.  She is now status post L4/5 interlaminar epidural steroid injection.  She also reports limited benefit from this procedure.  Her pain is mainly located across the lower back with intermittent radiation into the legs.  Her back pain is currently her greatest complaint.  It bothers her the most when she stands for a long time in changes positions.  She is still in physical therapy but states that she missed her appointment today due to her nephew being ill.  Her pain today is 10/10.     Interval History 8/25/2020:  The patient is here today for follow-up of chronic lower back pain.  The pain radiates down the posterolateral aspect of both legs to her shin.  She had limited benefit with bilateral L4/5 TF NAKITA in the past.  We previously discussed IL NAKITA which she would like to schedule.  Since her previous encounter, she has started physical therapy which she thinks is helpful.  She has increased her home activity regimen as previously discussed.  Her pain today is 9/10     Interval History 7/14/2020:  The patient presents for follow up of lower back and leg pain.  She is  s/p bilateral L4/5 TF NAKITA on 6/24/20 with limited benefit of leg pain.  She denies any respiratory changes such as fever, cough, or shortness of breath.  She continues to report pain that starts across the lower back with radiation down the side of both legs.  She has associated numbness and tingling to both legs.  Her pain worsens when she walks and when she is active.  She says that she had to PT appointments did not have follow-up appointments made.  The previous note indicates that she is to continue with PT she is seeing Snow Collazo for chronic pain as occasion.  She says that she has been taking Lyrica at night that is making her wake up in the middle of the night with dizziness.  She stops it as instructed by PCP.  She has an appointment with her neurologist tomorrow.  Her pain today is 10/10.     Previous Encounter:  Yanni Kaiser presents to the clinic for the evaluation of lower back pain. The pain started 3 years ago following no specific incident and symptoms have been worsening.The pain is located in the lumbar area and radiates to the left leg mostly but also into the right leg. Non-specific dermatomal distribution.  The pain is described as aching, sharp and shooting and is rated as 10/10. The pain is rated with a score of  10/10 on the BEST day and a score of 10/10 on the WORST day.  Symptoms interfere with daily activity and sleeping. The pain is exacerbated by Sitting, Walking, Lifting and Getting out of bed/chair.  The pain is mitigated by heat, ice, medications and physical therapy. She reports spending 4 hours per day reclining. The patient reports 0 hours of uninterrupted sleep per night.     Physical Therapy/Home Exercise: yes      Pain Disability Index Review:  Last 3 PDI Scores 1/18/2022 12/20/2021 11/11/2020   Pain Disability Index (PDI) 47 58 52      Pain Medications:     - Opioids: Percocet (Oxycodone/Acetaminophen)  - Adjuvant Medications: Advil,Motrin ( Ibuprofen)   Lidoderm  patch, without benefit  Voltaren gel, without benefit  Gabapentin, side effects  Lamotrigine      report:  Reviewed and consistent with medication use as prescribed.     Pain Procedures:   6/4/20 Bilateral L4/5 TF NAKITA- only a few days of pain relief   12/23/2022- Bilateral L4/5 TF NAKITA     Imaging:   Narrative       EXAMINATION:  MRI LUMBAR SPINE WITHOUT CONTRAST    CLINICAL HISTORY:  spinal stenosis; Lumbago with sciatica, right side    TECHNIQUE:  Multiplanar, multisequence MR images were acquired from the thoracolumbar junction to the sacrum without contrast.    COMPARISON:  MRI lumbar spine 08/28/2018    FINDINGS:  Straightening of the normal lumbar lordosis.  Mild grade 1 retrolisthesis L5 on S1    Vertebral body heights are maintained.  Several T1 and T2 hyperintense lesions within the L2 and L3 vertebral body favored to represent osseous hemangiomas.  Otherwise normal marrow signal.  No fracture.    Multilevel partial disc desiccation throughout the lumbar spine.  Posterior annular fissuring at L2-L3.    Distal spinal cord is unremarkable.  Conus terminates at L1-L2.    Degenerative findings:    T12-L1: No disc herniation, spinal canal stenosis, or neural foraminal narrowing.    L1-L2: No disc herniation, spinal canal stenosis, or neural foraminal narrowing.    L2-L3: No disc herniation, spinal canal stenosis or neural foraminal narrowing.    L3-L4: Mild circumferential disc bulge without spinal canal stenosis or neural foraminal narrowing.    L4-L5: Circumferential disc bulge, facet arthropathy, and ligamentum flavum hypertrophy resulting in moderate spinal canal stenosis and moderate bilateral neural foraminal narrowing.    L5-S1: Circumferential disc bulge and facet arthropathy without spinal canal stenosis or neural foraminal narrowing.    Paraspinal muscles and soft tissues are unremarkable.       Impression         Multilevel lumbar spondylosis most prominent at L4-5 resulting in moderate spinal canal  stenosis and moderate bilateral neural foraminal narrowing.  Findings have slightly progressed in comparison to prior study dated 2018.    Electronically signed by resident: Fady Reese  Date: 2020  Time: 08:27         Past Medical History:   Diagnosis Date    Allergy     Anemia     Anxiety     Asthma     Back pain     Chronic bronchitis     Chronic obstructive pulmonary disease, unspecified COPD type 2022    Cigarette smoker     DDD (degenerative disc disease), lumbar 2020    Depression     Diabetes mellitus with peripheral circulatory disorder 2022    Diabetes with neurologic complications     DUB (dysfunctional uterine bleeding) 10/16/2018    GERD (gastroesophageal reflux disease)     High cholesterol     Hyperprolactinemia     Hypertension     Influenza A 2020    Neuromuscular disorder     Obese body habitus     Obesity     Pseudotumor cerebri     Renal manifestation of secondary diabetes mellitus     Respiratory failure     Seizures     Simple endometrial hyperplasia     Sleep apnea     Smoker     Tobacco dependence     Urinary incontinence      Past Surgical History:   Procedure Laterality Date    ANTROSTOMY OF MAXILLARY SINUS AT INFERIOR NASAL MEATUS  10/22/2021    Procedure: MAXILLARY ANTROSTOMY, AT INFERIOR NASAL MEATUS;  Surgeon: John Prado III, MD;  Location: [facility] OR;  Service: ENT;;    BREAST CYST ASPIRATION       SECTION      X 1    CHOLECYSTECTOMY      COLONOSCOPY      COLONOSCOPY N/A 2019    Procedure: COLONOSCOPY;  Surgeon: Jagruti Sweeney MD;  Location: [facility] ENDO ([address] [place] [address]);  Service: Endoscopy;  Laterality: N/A;  BMI is 63/gastroparesis/3 days full liquid diet 1 day clear liquid see telephone encounter by Ciara limon    EPIDURAL STEROID INJECTION N/A 2020    Procedure: INJECTION, STEROID, EPIDURAL, L5-S1 IL;  Surgeon: Lawrence Marin MD;  Location: [facility] PAIN MGT;  Service: Pain Management;  Laterality: N/A;    ESOPHAGEAL  MANOMETRY WITH MEASUREMENT OF IMPEDANCE N/A 11/05/2019    Procedure: MANOMETRY-ESOPHAGEAL-WITH IMPEDANCE;  Surgeon: Jagruti Sweeney MD;  Location: [facility] (2ND FLR);  Service: Endoscopy;  Laterality: N/A;  Hold Narcotics x 1 days   Hold TCA x 1 days  Propofol only. No fentanyl or benzodiazepine during sedation. If additional sedation needed, discuss with Dr. Sweeney.  10/29 - pt confirmed appt    ESOPHAGOGASTRODUODENOSCOPY N/A 01/14/2019    Procedure: EGD (ESOPHAGOGASTRODUODENOSCOPY);  Surgeon: Jagruti Sweeney MD;  Location: [facility] (2ND FLR);  Service: Endoscopy;  Laterality: N/A;  BMI is 63/gastroparesis/3 days full liquid diet 1 day clear liquid see telephone encounter by Ciara limon    ESOPHAGOGASTRODUODENOSCOPY N/A 11/05/2019    Procedure: ESOPHAGOGASTRODUODENOSCOPY (EGD);  Surgeon: Jagruti Sweeney MD;  Location: [facility] (2ND FLR);  Service: Endoscopy;  Laterality: N/A;  EGD with EndoFlip /+/- Botox  2nd floor for gastroparesis/BMI 63 **367lbs**  Bariatric Stretcher needed  Manometry probe to be placed endoscopically during EGD procedure  Due to change in protocol, Full liquid diet for 3 days/ 1 day of clear liquids  Hi    ESOPHAGOGASTRODUODENOSCOPY N/A 12/14/2020    Procedure: ESOPHAGOGASTRODUODENOSCOPY (EGD) with BRAVO;  Surgeon: Jagruti Sweeney MD;  Location: [facility] (2ND FLR);  Service: Endoscopy;  Laterality: N/A;  with Dilation  2nd floor due to BMI 56.20 (327 lbs) and gastroparesis  3 days full liquid diet and 1 day clears    ESOPHAGOGASTRODUODENOSCOPY N/A 04/15/2021    Procedure: ESOPHAGOGASTRODUODENOSCOPY (EGD);  Surgeon: Jagruti Sweeney MD;  Location: [facility] ENDO (2ND FLR);  Service: Endoscopy;  Laterality: N/A;  Endoflip  2nd floor-gastroparesis, BMI 57.18, bariatric stretcher  full liquid diet x3 days, clear liquid diet x1 day prior to procedure  propofol only  covid test 4/12 primary care, instructions emailed-KPvt    FOOT FRACTURE SURGERY Left     FUNCTIONAL ENDOSCOPIC SINUS  SURGERY (FESS) USING COMPUTER-ASSISTED NAVIGATION Bilateral 10/22/2021    Procedure: FESS, USING COMPUTER-ASSISTED NAVIGATION;  Surgeon: John Prado III, MD;  Location: [facility];  Service: ENT;  Laterality: Bilateral;  FOLLOW DR PRADO ANESTHESIA PROTOCAL / pt will stay 23 hour post surgery for SHARATH observation    HYSTEROSCOPY WITH DILATION AND CURETTAGE OF UTERUS N/A 10/16/2018    KJB    INJECTION OF ANESTHETIC AGENT AROUND NERVE Bilateral 10/23/2020    Procedure: BLOCK, NERVE  bilateral L3,4,5 MBB;  Surgeon: Lawrence Marin MD;  Location: [facility] PAIN MGT;  Service: Pain Management;  Laterality: Bilateral;  bilateral L3,4,5 MBB   consent needed    INJECTION OF ANESTHETIC AGENT AROUND NERVE Bilateral 02/18/2022    Procedure: BLOCK, NERVE, L3-L4-L5 MEDIAL BRANCH;  Surgeon: Lawrence Marin MD;  Location: [facility] PAIN MGT;  Service: Pain Management;  Laterality: Bilateral;    PLANTAR FASCIOTOMY Left 11/18/2019    Procedure: FASCIOTOMY, PLANTAR;  Surgeon: Valentino Orourke DPM;  Location: [address];  Service: Podiatry;  Laterality: Left;    RETINAL LASER PROCEDURE Left     SINUS SURGERY      SPHENOIDECTOMY Bilateral 10/22/2021    Procedure: SPHENOIDECTOMY;  Surgeon: John Prado III, MD;  Location: [facility];  Service: ENT;  Laterality: Bilateral;    TRANSFORAMINAL EPIDURAL INJECTION OF STEROID Bilateral 06/24/2020    Procedure: INJECTION, STEROID, EPIDURAL, TRANSFORAMINAL APPROACH;  Surgeon: Lawrence Marin MD;  Location: [facility] PAIN MGT;  Service: Pain Management;  Laterality: Bilateral;    TRANSFORAMINAL EPIDURAL INJECTION OF STEROID Bilateral 01/28/2022    Procedure: INJECTION, STEROID, EPIDURAL, TRANSFORAMINAL APPROACH  Bilateral TFESI L4/L5      NEEDS CONSENT;  Surgeon: Lawrence Marin MD;  Location: [facility] PAIN MGT;  Service: Pain Management;  Laterality: Bilateral;    TRANSFORAMINAL EPIDURAL INJECTION OF STEROID Bilateral 12/23/2022    Procedure: INJECTION, STEROID, EPIDURAL, TRANSFORAMINAL APPROACH  "BILATERAL L4/L5 CONTRAST  NEEDS CONSENT;  Surgeon: Lawrence Marin MD;  [facility];  Service: Pain Management;  Laterality: Bilateral;    UPPER GASTROINTESTINAL ENDOSCOPY       Social History     Socioeconomic History    Marital status:     Number of children: 4   Occupational History    Occupation: disable   Tobacco Use    Smoking status: Every Day     Packs/day: 0.25     Years: 27.00     Pack years: 6.75     Types: Cigarettes     Start date: 1/1/1992     Passive exposure: Current    Smokeless tobacco: Never    Tobacco comments:     "1/2 pack per every 2 days or so"           10/21/22 patient says its less than half a pack now. Has been cutting back.   Substance and Sexual Activity    Alcohol use: Yes     Alcohol/week: 1.0 standard drink     Types: 1 Shots of liquor per week     Comment: occasionally    Drug use: No    Sexual activity: Yes     Partners: Male     Birth control/protection: None     Comment:      Social Determinants of Health     Financial Resource Strain: High Risk    Difficulty of Paying Living Expenses: Very hard   Food Insecurity: Food Insecurity Present    Worried About Running Out of Food in the Last Year: Often true    Ran Out of Food in the Last Year: Sometimes true   Transportation Needs: Unmet Transportation Needs    Lack of Transportation (Medical): Yes    Lack of Transportation (Non-Medical): Yes   Physical Activity: Inactive    Days of Exercise per Week: 0 days    Minutes of Exercise per Session: 0 min   Stress: Stress Concern Present    Feeling of Stress : To some extent   Social Connections: Unknown    Frequency of Communication with Friends and Family: Three times a week    Frequency of Social Gatherings with Friends and Family: Once a week    Active Member of Clubs or Organizations: Yes    Attends Club or Organization Meetings: More than 4 times per year    Marital Status:    Housing Stability: High Risk    Unable to Pay for Housing in the Last Year: " Yes    Number of Places Lived in the Last Year: 1    Unstable Housing in the Last Year: No     Family History   Problem Relation Age of Onset    Hypertension Mother     Diabetes Mother     Colon cancer Mother 50    Colon polyps Mother     Cataracts Mother     Heart disease Father     COPD Father     Heart attack Father     Hypertension Father     Ulcers Father     Cataracts Father     Glaucoma Father     No Known Problems Brother     Diabetes Daughter         prediabetes    Diabetes Daughter         prediabetic    Hypertension Daughter     Obesity Daughter     No Known Problems Daughter     No Known Problems Son     No Known Problems Maternal Aunt     No Known Problems Maternal Uncle     No Known Problems Paternal Aunt     No Known Problems Paternal Uncle     Cancer Maternal Grandmother     Breast cancer Maternal Grandmother     Colon cancer Maternal Grandmother     Colon polyps Maternal Grandmother     No Known Problems Maternal Grandfather     No Known Problems Paternal Grandmother     No Known Problems Paternal Grandfather     Celiac disease Neg Hx     Cirrhosis Neg Hx     Crohn's disease Neg Hx     Cystic fibrosis Neg Hx     Esophageal cancer Neg Hx     Hemochromatosis Neg Hx     Inflammatory bowel disease Neg Hx     Irritable bowel syndrome Neg Hx     Liver cancer Neg Hx     Liver disease Neg Hx     Rectal cancer Neg Hx     Stomach cancer Neg Hx     Ulcerative colitis Neg Hx     Jonnie's disease Neg Hx     Tuberculosis Neg Hx     Lymphoma Neg Hx     Scleroderma Neg Hx     Rheum arthritis Neg Hx     Melanoma Neg Hx     Multiple sclerosis Neg Hx     Psoriasis Neg Hx     Lupus Neg Hx     Skin cancer Neg Hx     Amblyopia Neg Hx     Blindness Neg Hx     Macular degeneration Neg Hx     Retinal detachment Neg Hx     Strabismus Neg Hx     Stroke Neg Hx     Thyroid disease Neg Hx     Allergic rhinitis Neg Hx     Allergies Neg Hx     Angioedema Neg Hx     Asthma Neg Hx     Eczema Neg Hx     Immunodeficiency Neg Hx      Rhinitis Neg Hx     Urticaria Neg Hx     Atopy Neg Hx        Review of patient's allergies indicates:   Allergen Reactions    Gabapentin Other (See Comments)     Makes her twitch       Current Outpatient Medications   Medication Sig    albuterol (PROVENTIL/VENTOLIN HFA) 90 mcg/actuation inhaler Inhale 2 puffs into the lungs every 6 (six) hours as needed for Wheezing or Shortness of Breath. Rescue    albuterol-ipratropium (DUO-NEB) 2.5 mg-0.5 mg/3 mL nebulizer solution Take 3 mLs by nebulization every 6 (six) hours as needed for Wheezing. Rescue    alcohol swabs (BD ALCOHOL SWABS) PadM Apply 1 each topically 2 (two) times a day.    amitriptyline (ELAVIL) 25 MG tablet Take 1 tablet (25 mg total) by mouth nightly as needed for Insomnia.    ammonium lactate 12 % Crea Apply twice daily to affected parts both feet as needed.    azelastine (ASTELIN) 137 mcg (0.1 %) nasal spray 2 sprays (274 mcg total) by Nasal route 2 (two) times daily.    blood sugar diagnostic Strp 1 each by Misc.(Non-Drug; Combo Route) route 4 (four) times daily before meals and nightly. ACCU CHEK ELVIA PLUS METER    blood-glucose meter (ACCU-CHEK ELVIA PLUS METER) Misc TEST FOUR TIMES DAILY BEFORE MEALS AND EVERY NIGHT    cetirizine (ZYRTEC) 10 MG tablet Take 1 tablet (10 mg total) by mouth daily as needed for Allergies (itching).    clobetasoL (TEMOVATE) 0.05 % external solution Apply topically 2 (two) times daily. Prn scalp itch or rash    cyclobenzaprine (FLEXERIL) 10 MG tablet     diclofenac sodium (VOLTAREN) 1 % Gel Apply 2 g topically 3 (three) times daily. Apply to painful joints    empagliflozin (JARDIANCE) 10 mg tablet Take 1 tablet (10 mg total) by mouth once daily.    fluticasone furoate-vilanteroL (BREO ELLIPTA) 200-25 mcg/dose DsDv diskus inhaler Inhale 1 puff into the lungs once daily. Controller    fluticasone propionate (FLONASE) 50 mcg/actuation nasal spray 2 sprays (100 mcg total) by Each Nostril route 2 (two) times a day.    lancets  (ACCU-CHEK FASTCLIX LANCET DRUM) Misc 1 Device by Misc.(Non-Drug; Combo Route) route 2 (two) times a day.    lancets (ACCU-CHEK SOFTCLIX LANCETS) Misc 1 Device by Misc.(Non-Drug; Combo Route) route 4 (four) times daily.    levocetirizine (XYZAL) 5 MG tablet Take 1 tablet (5 mg total) by mouth every evening.    lidocaine (LIDODERM) 5 % Place 1 patch onto the skin once daily. Remove & Discard patch within 12 hours or as directed by MD    LIDOcaine HCL 2% (XYLOCAINE) 2 % jelly Apply topically as needed. Apply topically once nightly to affected part of foot/feet.    LINZESS 145 mcg Cap capsule Take 1 capsule (145 mcg total) by mouth once daily.    losartan-hydrochlorothiazide 100-25 mg (HYZAAR) 100-25 mg per tablet Take 1 tablet by mouth once daily.    montelukast (SINGULAIR) 10 mg tablet Take 1 tablet (10 mg total) by mouth every evening.    nicotine (NICODERM CQ) 21 mg/24 hr Place 1 patch onto the skin once daily.    nystatin-triamcinolone (MYCOLOG) ointment Apply topically 2 (two) times daily.    ondansetron (ZOFRAN) 8 MG tablet Take 1 tablet (8 mg total) by mouth every 8 (eight) hours as needed for Nausea.    oxyCODONE-acetaminophen (PERCOCET) 7.5-325 mg per tablet Take 1 tablet by mouth 3 (three) times daily as needed for Pain.    pantoprazole (PROTONIX) 40 MG tablet Take 1 tablet (40 mg total) by mouth 2 (two) times daily.    pregabalin (LYRICA) 50 MG capsule Take 1 capsule (50 mg total) by mouth 3 (three) times daily.    QUEtiapine (SEROQUEL) 25 MG Tab Take 1 tablet (25 mg total) by mouth once daily.    semaglutide 2 mg/dose (8 mg/3 mL) PnIj Inject 2 mg into the skin every 7 days.    sulindac (CLINORIL) 200 MG Tab Take 1 tablet (200 mg total) by mouth 2 (two) times daily.    topiramate (TOPAMAX) 50 MG tablet Take 2 tablets (100 mg total) by mouth every evening.    triamcinolone acetonide 0.1% (KENALOG) 0.1 % cream Apply topically 2 (two) times daily. Prn focally for rash spots on forehead and legs.Stop using  "steroid topical when skin is smooth and non itchy.  Do not treat dark or red coloring.    venlafaxine (EFFEXOR-XR) 75 MG 24 hr capsule Take 1 capsule (75 mg total) by mouth once daily.    EPINEPHrine (EPIPEN) 0.3 mg/0.3 mL AtIn Inject 0.3 mLs (0.3 mg total) into the muscle once. for 1 dose     Current Facility-Administered Medications   Medication    albuterol inhaler 2 puff    diphenhydrAMINE injection 25 mg    EPINEPHrine (EPIPEN) 0.3 mg/0.3 mL pen injection 0.3 mg    methylPREDNISolone sodium succinate injection 40 mg    ondansetron disintegrating tablet 4 mg    sodium chloride 0.9% 500 mL flush bag    sodium chloride 0.9% flush 10 mL       REVIEW OF SYSTEMS:    GENERAL:  No weight loss, malaise or fevers.  HEENT:   No recent changes in vision or hearing. + Migraine  NECK:  Negative for lumps, no difficulty with swallowing.  RESPIRATORY:  Negative for cough, wheezing or shortness of breath, patient denies any recent URI. + SHARATH  CARDIOVASCULAR:  Negative for chest pain, leg swelling or palpitations. +HTN  GI:  Negative for abdominal discomfort, blood in stools or black stools or change in bowel habits.  MUSCULOSKELETAL:  See HPI.  SKIN:  Negative for lesions, rash, and itching.  PSYCH:  No mood disorder or recent psychosocial stressors.  Patients sleep is not disturbed secondary to pain.  HEMATOLOGY/LYMPHOLOGY:  Negative for prolonged bleeding, bruising easily or swollen nodes.  Patient is not currently taking any anti-coagulants  NEURO:   No history of headaches, syncope, paralysis, seizures or tremors.  All other reviewed and negative other than HPI.    OBJECTIVE:    Vitals:    12/30/22 1112   BP: 128/77   Pulse: 75   Resp: 19   Temp: 98.9 °F (37.2 °C)   TempSrc: Oral   Weight: (!) 162 kg (357 lb 2.3 oz)   Height: 5' 4" (1.626 m)   PainSc:   8   PainLoc: Knee      PHYSICAL EXAMINATION:    General appearance: Well appearing, in no acute distress, alert and oriented x3.  Psych:  Mood and affect appropriate.  Skin: " Skin color, texture, turgor normal, no rashes or lesions, in both upper and lower body.  Head/face:  Atraumatic, normocephalic. No palpable lymph nodes  Cor: RRR  Pulm: Symmetric chest rise, no respiratory distress noted.   Extremities: No deformities, edema, or skin discoloration. Good capillary refill.  Musculoskeletal: There is pain with palpation over medial and lateral joint line of left knee. Warmth noted over lateral aspect of left knee. Mild swelling noted. Limited ROM with pain on extension. Bilateral lower extremity strength is normal and symmetric.  No atrophy or tone abnormalities are noted.  Neuro: Bilateral lower extremity sensation and reflexes symmetrical and intact  Gait: Antalgic- ambulates with cane.       ASSESSMENT: 52 y.o. year old female with knee pain, consistent the followin. Acute pain of left knee  X-Ray Knee 1 or 2 View Left              PLAN:     - Previous imaging was reviewed and discussed with the patient today.    - She is s/p lumbar NAKITA, she is unsure of relief at this time. We discussed that this may take 2 full weeks for benefit, she verbalized understanding.     - Obtain xray of left knee.     - Schedule for left knee steroid injection in office under ultrasound.     - Continue current medications.     - RTC 2 weeks after above procedure.     - Dr. Marin was consulted on the patient and agrees with this plan.    The above plan and management options were discussed at length with patient. Patient is in agreement with the above and verbalized understanding.     Kat Brooks NP  2022

## 2023-01-01 NOTE — PATIENT INSTRUCTIONS
Counseling and Referral of Other Preventative  (Italic type indicates deductible and co-insurance are waived)    Patient Name: Yanni Kaiser  Today's Date: 1/22/2018    Health Maintenance       Date Due Completion Date    TETANUS VACCINE 08/04/1990 ---    Hemoglobin A1c 12/26/2017 6/26/2017    Override on 5/1/2015: Done    Foot Exam 02/20/2018 2/20/2017    Mammogram 04/26/2018 4/26/2017    Lipid Panel 09/21/2018 9/21/2017    Eye Exam 09/29/2018 9/29/2017    Pap Smear with HPV Cotest 08/23/2019 8/23/2016 (Done)    Override on 8/23/2016: Done    Pneumococcal PPSV23 (Medium Risk) (2) 08/04/2037 4/25/2017        Orders Placed This Encounter   Procedures    Lipid panel    Basic metabolic panel     The following information is provided to all patients.  This information is to help you find resources for any of the problems found today that may be affecting your health:                Living healthy guide: www.[facility].[place].[facility]      Understanding Diabetes: www.diabetes.org      Eating healthy: www.cdc.gov/healthyweight      CDC home safety checklist: www.cdc.gov/steadi/patient.html      Agency on Aging: www.goea.[place].gov      Alcoholics anonymous (AA): www.aa.org      Physical Activity: www.janis.nih.gov/lh0kxgz      Tobacco use: www.quitwithusla.org     
Statement Selected

## 2023-01-03 ENCOUNTER — TELEPHONE (OUTPATIENT)
Dept: PAIN MEDICINE | Facility: CLINIC | Age: 53
End: 2023-01-03
Payer: MEDICARE

## 2023-01-03 DIAGNOSIS — M25.562 ACUTE PAIN OF LEFT KNEE: Primary | ICD-10-CM

## 2023-01-03 DIAGNOSIS — M54.16 LUMBAR RADICULOPATHY: ICD-10-CM

## 2023-01-03 DIAGNOSIS — G89.4 CHRONIC PAIN DISORDER: ICD-10-CM

## 2023-01-03 NOTE — HOSPITAL COURSE
52y F w/ obstructive airway disease who presented with shortness of breath. Her testing revealed positive COVID and Influenza tests. She had recently tested positive for COVID so it was felt that this was likely not indicative of a new infection. She was treated with remdesivir out of an abundance of caution. She received tamiflu for influenza. By discharge she was still having flu symptoms but was respiratorily stable and requesting discharge. She was discharged to follow up w/ PCP and pulmonology. All questions answered, pt in agreement with plan.   Quality 110: Preventive Care And Screening: Influenza Immunization: Influenza Immunization not Administered because Patient Refused. Quality 226: Preventive Care And Screening: Tobacco Use: Screening And Cessation Intervention: Patient screened for tobacco use and is an ex/non-smoker Detail Level: Simple

## 2023-01-03 NOTE — TELEPHONE ENCOUNTER
----- Message from Snow Alatorre sent at 1/3/2023  7:59 AM CST -----  Contact: VANI CAMPUZANO [[id number]]  Type: Call Back      Who called: VANI CAMPUZANO [[id number]]      What is the request in detail: Patient is requesting a call back in regard to rescheduling her procedure later on this week.   Please advise.     Can the [facility] reply [facility]? No      Would the patient rather a call back or a response via [facility]? Call back       Best call back number: [phone] (home)       Additional Information:

## 2023-01-04 ENCOUNTER — TELEPHONE (OUTPATIENT)
Dept: PAIN MEDICINE | Facility: CLINIC | Age: 53
End: 2023-01-04
Payer: MEDICARE

## 2023-01-09 ENCOUNTER — PATIENT MESSAGE (OUTPATIENT)
Dept: BARIATRICS | Facility: CLINIC | Age: 53
End: 2023-01-09
Payer: MEDICARE

## 2023-01-09 PROBLEM — U07.1 PNEUMONIA DUE TO COVID-19 VIRUS: Status: RESOLVED | Noted: 2022-09-08 | Resolved: 2023-01-09

## 2023-01-09 PROBLEM — J12.82 PNEUMONIA DUE TO COVID-19 VIRUS: Status: RESOLVED | Noted: 2022-09-08 | Resolved: 2023-01-09

## 2023-01-10 ENCOUNTER — CLINICAL SUPPORT (OUTPATIENT)
Dept: REHABILITATION | Facility: HOSPITAL | Age: 53
End: 2023-01-10
Attending: ANESTHESIOLOGY
Payer: MEDICARE

## 2023-01-10 DIAGNOSIS — R29.3 POOR POSTURE: ICD-10-CM

## 2023-01-10 DIAGNOSIS — R29.898 DECREASED STRENGTH OF TRUNK AND BACK: ICD-10-CM

## 2023-01-10 DIAGNOSIS — M25.69 DECREASED RANGE OF MOTION OF TRUNK AND BACK: ICD-10-CM

## 2023-01-10 DIAGNOSIS — M54.16 LUMBAR RADICULOPATHY: ICD-10-CM

## 2023-01-10 PROCEDURE — 97110 THERAPEUTIC EXERCISES: CPT | Mod: HCNC,PN

## 2023-01-10 PROCEDURE — 97161 PT EVAL LOW COMPLEX 20 MIN: CPT | Mod: HCNC,PN

## 2023-01-13 NOTE — PLAN OF CARE
[facility]     Name: Yanni Kaiser  Clinic number: [id number]    Therapy diagnosis:   Encounter Diagnoses   Name Primary?    Lumbar radiculopathy     Poor posture     Decreased range of motion of trunk and back     Decreased strength of trunk and back      Physician: Lawrence Marin MD    Physician orders: PT Eval and Treat   Medical diagnosis from referral: M54.16 (ICD-10-CM) - Lumbar radiculopathy  Evaluation date: 1/10/2023  Authorization period expiration: 12/5/23  Plan of care expiration: 4/10/2023  Reassessment due: 2/10/2023   Visit # / visits authorized: 1/1    Time in: 945  Time out: 1100  Total billable time: 75 minutes    Precautions: Standard and Diabetes, Asthma     Pattern of pain determined: Pattern 5      SUBJECTIVE   Date of onset: many years  History of current condition - Yanni reports that back pain began many years ago. She reports no mechanism of injury. Her pain is largely centralized but does report B radicular symptoms below the knee (L>R). Describes pain as "stinging, burning" and confirms B radicular symptoms or paresthesias in the lower extremities. Reports that her pain is "all over her legs" Reports aggravating factors to be prolonged time in one position, bending forward, lifting. Alleviating factors are reported to be massage, hot pack. Unable to report any goals.     Medical history:   Past Medical History:   Diagnosis Date    Allergy     Anemia     Anxiety     Asthma     Back pain     Chronic bronchitis     Chronic obstructive pulmonary disease, unspecified COPD type 4/1/2022    Cigarette smoker     DDD (degenerative disc disease), lumbar 6/9/2020    Depression     Diabetes mellitus with peripheral circulatory disorder 4/1/2022    Diabetes with neurologic complications     DUB (dysfunctional uterine bleeding) 10/16/2018    GERD (gastroesophageal reflux disease)     High cholesterol     Hyperprolactinemia     Hypertension     " Influenza A 2020    Neuromuscular disorder     Obese body habitus     Obesity     Pseudotumor cerebri     Renal manifestation of secondary diabetes mellitus     Respiratory failure     Seizures     Simple endometrial hyperplasia 2018    Sleep apnea     Smoker     Tobacco dependence     Urinary incontinence        Surgical history:   Yanni Kaiser  has a past surgical history that includes Cholecystectomy; Sinus surgery;  section; Breast cyst aspiration; Hysteroscopy with dilation and curettage of uterus (N/A, 10/16/2018); Colonoscopy; Upper gastrointestinal endoscopy; Esophagogastroduodenoscopy (N/A, 2019); Colonoscopy (N/A, 2019); Retinal laser procedure (Left); Esophagogastroduodenoscopy (N/A, 2019); Esophageal manometry with measurement of impedance (N/A, 2019); Plantar fasciotomy (Left, 2019); Foot fracture surgery (Left); Transforaminal epidural injection of steroid (Bilateral, 2020); Epidural steroid injection (N/A, 2020); Injection of anesthetic agent around nerve (Bilateral, 10/23/2020); Esophagogastroduodenoscopy (N/A, 2020); Esophagogastroduodenoscopy (N/A, 04/15/2021); Functional endoscopic sinus surgery (FESS) using computer-assisted navigation (Bilateral, 10/22/2021); Sphenoidectomy (Bilateral, 10/22/2021); Antrostomy of maxillary sinus at inferior nasal meatus (10/22/2021); Transforaminal epidural injection of steroid (Bilateral, 2022); Injection of anesthetic agent around nerve (Bilateral, 2022); and Transforaminal epidural injection of steroid (Bilateral, 2022).    Medications:   Yanni has a current medication list which includes the following prescription(s): albuterol, albuterol-ipratropium, alcohol swabs, amitriptyline, ammonium lactate, azelastine, blood sugar diagnostic, blood-glucose meter, cetirizine, clobetasol, cyclobenzaprine, diclofenac sodium, empagliflozin, epinephrine, fluticasone  furoate-vilanterol, fluticasone propionate, lancets, lancets, levocetirizine, lidocaine, lidocaine hcl 2%, linzess, losartan-hydrochlorothiazide 100-25 mg, montelukast, nicotine, nystatin-triamcinolone, ondansetron, oxycodone-acetaminophen, pantoprazole, pregabalin, quetiapine, semaglutide, sulindac, topiramate, triamcinolone acetonide 0.1%, and venlafaxine, and the following Facility-Administered Medications: albuterol, diphenhydramine, epinephrine, methylprednisolone sodium succinate, ondansetron, sodium chloride 0.9% 500 mL flush bag, and sodium chloride 0.9%.    Allergies:   Review of patient's allergies indicates:   Allergen Reactions    Gabapentin Other (See Comments)     Makes her twitch        Imaging: No relevant imaging on file       Prior therapy and/or treatment(s): yes, multiple times with varying levels of relief, has been to [facility] and enjoyed it   Social history: good support   Occupation: not working  Leisure: nothing      PLOF: independent   Current level of function: independent with pain but requires increased time  DME owned/used: none       Pain:  Current 8/10, worst 10/10, best 5/10   Location: across low back mainly  Description: burning, stinging  Aggravating factors: prolonged time in one position, bending forward, lifting  Easing factors: massage, heat  Disturbed sleep: 2-3x/night       Pain patterns:  1.  Where is your pain the worst? back  2.  Is your pain constant or intermittent? Constant   3.  Does bending forward make your typical pain worse? yes  4.  Since the start of your back pain, has there been a change in your bowel or bladder? Yes, has been to pelvic floor PT  5.  What can't you do now that you use to be able to do? Lift, walk long distances      Pts goals: unable to name any       *Clinical red flags*:  Cough/sneeze strain: occasionally   Bladder/bowel changes: see above   Falls: no falls since November   Night pain: no  Unexplained weight loss: no  General health: fair      OBJECTIVE     Postural examination/scapula alignment: Rounded shoulder, Head forward, Increased kyphosis, and increased lumbar lordosis   Joint integrity: Firm end feeling  Palpation: tenderness with palpation to lumbar paraspinals    Leg length: WFL  Correction of posture: better with lumbar roll    Trunk mobility:   Norm ROM loss   Flexion Fingers touch toes, sacral angle >/= 70 deg, uniform spinal curvature, posterior weight shift  moderate loss   Extension ASIS surpasses toes, spine of scapulae surpasses heels, uniform spinal curve within functional limits   Side-bending right  minimal loss   Side-bending left  minimal loss   Rotation right PT observes contralateral shoulder minimal loss   Rotation left PT observes contra lateral shoulder minimal loss     LE mobility:   Norm Observed   HS length        70-90 deg R: 60      L: 60   Hip rotation     IR 30 deg    ER 40 deg R: L:     IR:  WFL  WFL     ER:  WFL  WFL   Neal test (hip flexors/quads)  R: +           L: +     LE strength:  RLE  LLE    Hip flexion 3/5 Hip flexion 3-/5   Hip extension 3+/5 Hip extension: 3-/5   Hip abduction 3+/5 Hip abduction 3/5   Hip adduction 3+/5 Hip adduction  3/5   Knee flexion 4-/5 Knee flexion 3+/5   Knee extension 4-/5 Knee extension 3+/5   Ankle dorsiflexion 4+/5 Ankle dorsiflexion 4+/5   Ankle plantarflexion 4+/5 Ankle plantarflexion 4+/5     Gait:  Assistive device used: none  Level of assistance: independent  Patient displays the following gait deviations:  unsteady gait at times     Special tests:   Test name  Result   Prone Instability Test (+)   SIJ Provocation Test(s):  ARCHIE, compression, distraction, sacral thrust (--)   Straight Leg Raise (--)   Neural Tension (Slump) Test (--)   Crossed Straight Leg Raise (--)   Walking on toes Not able   Walking on heels  Not able   [place]'s sign  (+)   Bridge test Able   Active SLR Able       Neurological screening:    Sensation: WFL   Left Right   Patella Tendon 1+ 1+    Achilles Tendon 1+ 1+   Babinski  (--) (--)       Repeated movements:  Repeated standing flexion worse   Repeated standing extension worse   Repeated lying flexion no effect   Repeated lying extension no effect     Static postures:  Sitting slouched  worse   Sitting erect better   Standing slouched worse   Standing erect  better       Baseline isometric testing on MedX equipment:   Testing administered by PT    [facility] 1/10/2023   Visit Number 1   VAS Pain Rating 8   Lumbar Stretches - Slouch Overcorrection 5   Extension in Lying 5   Extension in Standing 5   Flexion in Lying 5   Flexion in Sitting 5   Lumbar Extension Seat Pad 0   Femur Restraint 6   Top Dead Center 24   Counterweight 404   Lumbar Flexion 30   Lumbar Extension 0   Lumbar Peak Torque 93   Min Torque 48   Test Percent Below Normative Data 50   Ice - Z Lie (in min.) 10         Date of testin/10/23  ROM 0-30 deg   Max Peak Torque 93    Min Peak Torque 48    Flex/Ext Ratio 1.9   % below normative data 50%         FOTO not administered up front       TREATMENT   Total tx time separate from evaluation: 25 minutes      Vertrice received therapeutic exercises to develop/improve posture, lumbar/thoracic ROM, strength and muscular endurance for 25 minutes including the following exercises:     X10 all  LTR  DKTC  PPT    MedX dynamic exercise and baseline IM test    Manual therapy provided: none    Written home exercises provided: yes  Exercises were reviewed and Yanni was able to demonstrate them prior to the end of the session.  Vertrice demonstrated fair  understanding of the education provided.     See EMR under patient instructions for exercises provided 1/10/2023.      Education provided:   - Discussed proper sitting and standing posture, body mechanics, applicable ergonomics, and lumbar roll implications.  - Discussed sleep positioning, morning stiffness, and log rolling technique for reduced spinal strain.  - Pt received [facility] handouts which discussed therapy expectations, purpose/opportunity for health coaching and wellness program when discharged from therapy, back education and care specifically for seated/standing posture, lifting techniques, components of exercise, and the importance of nutrition, hydration, and sleep.   - Pt received a handout regarding anticipated muscular soreness following MedX isometric testing and strategies for management were reviewed with patient including stretching, cold application and scheduled rest.   - Pt received education on the [facility] program, purpose of isometric test assessment, and progression of back and peripheral strengthening.  - PT discussed importance of attending therapy and performing home exercises consistently for optimal therapeutic gain, as well as discussed attendance policy in full.       Yanni received cold pack for 10 minutes to lumbar spine in Z lying.    ASSESSMENT   Yanni is a 52 y.o. female referred to [facility] with a medical diagnosis of LBP. Pt presents with reported back dominant pain with any movement. Pt has been through [facility] program in the past and has been living with chronic pain. Upon physical assessment, pt demonstrates slouched posturing in sitting, mild hip weakness bilaterally, and trunk and hip mobility deficits. Upon further local examination, hip, lumbar, and thoracic rotation were all limited significantly. Per lumbar MedX testing, pt was 50% below average in strength when compared to those of  same age/height/weight/gender. All of the above noted supports potential lumbar classification as a pattern 5 with recurrent/ or consistent symptoms, thus pt is a good candidate for the [facility] Program. Pt would benefit from LE and trunk mobility training, stability training,  improved cardiovascular and muscular endurance, neuromuscular re-education for posture, coordination, and muscular recruitment and education on positional  offloading techniques to decrease the intensity and frequency of flare-ups.    Pain pattern: Pattern 5       Based on the above history and physical examination, an active physical therapy program is recommended. Pt prognosis is good and pt would likely benefit from skilled outpatient physical therapy to address the deficits stated above and in the chart below, to provide pt/family education, and to maximize pt's level of function and independence in both the home and community.     Plan of care discussed with patient: yes  Pt's spiritual, cultural and educational needs have been considered and pt is agreeable to the plan of care and goals as stated below:     Anticipated barriers for therapy: none     PT evaluation completed? yes    Medical necessity is demonstrated by the following problem list.    Pt presents with the following impairments:     History  Co-morbidities and personal factors that may impact POC Co-morbidities:   Chronic pain    Personal factors:   no deficits     low   Examination  Body structures and functions, activity limitations and participation restrictions that may impact POC Body regions:   back  lower extremities  trunk    Body systems:    gross symmetry  ROM  strength  gross coordinated movement  gait    Participation restrictions:  None     Activity limitations:  Learning and applying knowledge  no deficits    General tasks/commands  no deficits    Communication  no deficits    Mobility  no deficits    Self-care  no deficits    Domestic life  no deficits    Interactions/relationships  no deficits     Life areas  no deficits    Community/social life  no deficits         moderate   Clinical presentation evolving clinical presentation with changing clinical characteristics moderate   Decision making/complexity score: moderate       Goals:  Pt is in agreement with the following goals.    Short term goals: 6 weeks or 10 visits   1.  Pt will demonstrate increased lumbar ROM by at least 6  degrees from the initial ROM value with improvements noted in functional ROM and ability to perform ADLs.  (appropriate & ongoing)  2.  Pt will demonstrate increased MedX average isometric strength value by 25% from initial test, resulting in improved ability to perform bending, lifting, and carrying activities safely and confidently.  (appropriate & ongoing)  3.  Pt will report a reduction in worst pain score by 1-2 points for improved tolerance for QoL.  (appropriate & ongoing)  4.  Pt will be able to perform HEP correctly with minimal cueing or supervision from therapist to encourage independent management of symptoms. (appropriate & ongoing)    Long term goals: 10 weeks or 20 visits   1. Pt will demonstrate increased lumbar ROM by at least 12 degrees from initial ROM value, resulting in improved ability to perform functional fwd bending while standing and sitting. (appropriate & ongoing)  2. Pt will demonstrate increased MedX average isometric strength value by 50% from initial test, resulting in improved ability to perform bending, lifting, and carrying activities safely and confidently.  (appropriate & ongoing)  3. Pt to demonstrate ability to independently control and/or reduce their pain through posture positioning and mechanical movements throughout a typical day. (appropriate & ongoing)  4.  Pt will demonstrate reduced pain and improved functional outcomes as reported on the FOTO by reaching a limitation score of < or = 40% or less in order to demonstrate subjective improvement in pt's condition. .  (appropriate & ongoing)  5. Pt will demonstrate independence with HEP at discharge. (appropriate & ongoing)    Plan   Outpatient physical therapy 2x week for 10 weeks or 20 visits to include the following:   - Patient education  - Therapeutic exercise  - Manual therapy  - Performance testing   - Neuromuscular re-education  - Therapeutic activity   - Modalities    Pt may be seen by PTA as part of the  "rehabilitation team.     Therapist: [place], PT  1/10/2023    "I certify the need for these services furnished under this plan of treatment and while under my care."    ____________________________________  Physician/referring practitioner    _______________  Date of signature          "

## 2023-01-19 ENCOUNTER — HOSPITAL ENCOUNTER (OUTPATIENT)
Facility: HOSPITAL | Age: 53
Discharge: HOME | End: 2023-01-21
Attending: STUDENT IN AN ORGANIZED HEALTH CARE EDUCATION/TRAINING PROGRAM | Admitting: HOSPITALIST
Payer: MEDICARE

## 2023-01-19 ENCOUNTER — OFFICE VISIT (OUTPATIENT)
Dept: PHYSICAL MEDICINE AND REHAB | Facility: CLINIC | Age: 53
End: 2023-01-19
Payer: MEDICARE

## 2023-01-19 VITALS — WEIGHT: 293 LBS | BODY MASS INDEX: 64.82 KG/M2

## 2023-01-19 DIAGNOSIS — M54.42 CHRONIC BILATERAL LOW BACK PAIN WITH BILATERAL SCIATICA: ICD-10-CM

## 2023-01-19 DIAGNOSIS — Z79.891 CHRONICALLY ON OPIATE THERAPY: ICD-10-CM

## 2023-01-19 DIAGNOSIS — M47.26 OSTEOARTHRITIS OF SPINE WITH RADICULOPATHY, LUMBAR REGION: ICD-10-CM

## 2023-01-19 DIAGNOSIS — M79.7 FIBROMYALGIA SYNDROME: Primary | ICD-10-CM

## 2023-01-19 DIAGNOSIS — M48.061 NEURAL FORAMINAL STENOSIS OF LUMBAR SPINE: ICD-10-CM

## 2023-01-19 DIAGNOSIS — J18.9 PNEUMONIA DUE TO INFECTIOUS ORGANISM, UNSPECIFIED LATERALITY, UNSPECIFIED PART OF LUNG: Primary | ICD-10-CM

## 2023-01-19 DIAGNOSIS — M54.41 CHRONIC BILATERAL LOW BACK PAIN WITH BILATERAL SCIATICA: ICD-10-CM

## 2023-01-19 DIAGNOSIS — M17.0 PRIMARY OSTEOARTHRITIS OF BOTH KNEES: ICD-10-CM

## 2023-01-19 DIAGNOSIS — R07.9 CHEST PAIN: ICD-10-CM

## 2023-01-19 DIAGNOSIS — I50.9 CHF (CONGESTIVE HEART FAILURE): ICD-10-CM

## 2023-01-19 DIAGNOSIS — M48.061 SPINAL STENOSIS OF LUMBAR REGION, UNSPECIFIED WHETHER NEUROGENIC CLAUDICATION PRESENT: ICD-10-CM

## 2023-01-19 DIAGNOSIS — E66.01 MORBID OBESITY WITH BMI OF 60.0-69.9, ADULT: ICD-10-CM

## 2023-01-19 DIAGNOSIS — J45.901 ASTHMA EXACERBATION: ICD-10-CM

## 2023-01-19 DIAGNOSIS — U07.1 COVID-19: ICD-10-CM

## 2023-01-19 DIAGNOSIS — R06.02 SHORTNESS OF BREATH: ICD-10-CM

## 2023-01-19 DIAGNOSIS — G89.29 CHRONIC BILATERAL LOW BACK PAIN WITH BILATERAL SCIATICA: ICD-10-CM

## 2023-01-19 PROBLEM — J44.1 COPD EXACERBATION: Chronic | Status: ACTIVE | Noted: 2023-01-19

## 2023-01-19 LAB
ALBUMIN SERPL BCP-MCNC: 3.2 G/DL (ref 3.5–5.2)
ALP SERPL-CCNC: 74 U/L (ref 55–135)
ALT SERPL W/O P-5'-P-CCNC: 30 U/L (ref 10–44)
ANION GAP SERPL CALC-SCNC: 6 MMOL/L (ref 8–16)
AST SERPL-CCNC: 23 U/L (ref 10–40)
BACTERIA #/AREA URNS AUTO: ABNORMAL /HPF
BASOPHILS # BLD AUTO: 0.04 K/UL (ref 0–0.2)
BASOPHILS NFR BLD: 0.8 % (ref 0–1.9)
BILIRUB SERPL-MCNC: 0.4 MG/DL (ref 0.1–1)
BILIRUB UR QL STRIP: NEGATIVE
BNP SERPL-MCNC: <10 PG/ML (ref 0–99)
BUN SERPL-MCNC: 12 MG/DL (ref 6–20)
CALCIUM SERPL-MCNC: 8.7 MG/DL (ref 8.7–10.5)
CHLORIDE SERPL-SCNC: 110 MMOL/L (ref 95–110)
CLARITY UR REFRACT.AUTO: ABNORMAL
CO2 SERPL-SCNC: 21 MMOL/L (ref 23–29)
COLOR UR AUTO: YELLOW
CREAT SERPL-MCNC: 1.3 MG/DL (ref 0.5–1.4)
DIFFERENTIAL METHOD: ABNORMAL
EOSINOPHIL # BLD AUTO: 0.4 K/UL (ref 0–0.5)
EOSINOPHIL NFR BLD: 8.5 % (ref 0–8)
ERYTHROCYTE [DISTWIDTH] IN BLOOD BY AUTOMATED COUNT: 14.2 % (ref 11.5–14.5)
EST. GFR  (NO RACE VARIABLE): 50.1 ML/MIN/1.73 M^2
GLUCOSE SERPL-MCNC: 146 MG/DL (ref 70–110)
GLUCOSE UR QL STRIP: ABNORMAL
HCT VFR BLD AUTO: 40.4 % (ref 37–48.5)
HGB BLD-MCNC: 12.1 G/DL (ref 12–16)
HGB UR QL STRIP: NEGATIVE
IMM GRANULOCYTES # BLD AUTO: 0.02 K/UL (ref 0–0.04)
IMM GRANULOCYTES NFR BLD AUTO: 0.4 % (ref 0–0.5)
INFLUENZA A, MOLECULAR: NOT DETECTED
INFLUENZA B, MOLECULAR: NOT DETECTED
KETONES UR QL STRIP: NEGATIVE
LACTATE SERPL-SCNC: 1 MMOL/L (ref 0.5–2.2)
LEUKOCYTE ESTERASE UR QL STRIP: ABNORMAL
LYMPHOCYTES # BLD AUTO: 0.6 K/UL (ref 1–4.8)
LYMPHOCYTES NFR BLD: 12.5 % (ref 18–48)
MCH RBC QN AUTO: 30.6 PG (ref 27–31)
MCHC RBC AUTO-ENTMCNC: 30 G/DL (ref 32–36)
MCV RBC AUTO: 102 FL (ref 82–98)
MICROSCOPIC COMMENT: ABNORMAL
MONOCYTES # BLD AUTO: 0.6 K/UL (ref 0.3–1)
MONOCYTES NFR BLD: 12.5 % (ref 4–15)
NEUTROPHILS # BLD AUTO: 3.3 K/UL (ref 1.8–7.7)
NEUTROPHILS NFR BLD: 65.3 % (ref 38–73)
NITRITE UR QL STRIP: NEGATIVE
NRBC BLD-RTO: 0 /100 WBC
PH UR STRIP: 6 [PH] (ref 5–8)
PLATELET # BLD AUTO: 248 K/UL (ref 150–450)
PMV BLD AUTO: 9.3 FL (ref 9.2–12.9)
POTASSIUM SERPL-SCNC: 4.1 MMOL/L (ref 3.5–5.1)
PROT SERPL-MCNC: 6.9 G/DL (ref 6–8.4)
PROT UR QL STRIP: NEGATIVE
RBC # BLD AUTO: 3.96 M/UL (ref 4–5.4)
RBC #/AREA URNS AUTO: 1 /HPF (ref 0–4)
RSV AG BY MOLECULAR METHOD: NOT DETECTED
SARS-COV-2 RNA RESP QL NAA+PROBE: NOT DETECTED
SODIUM SERPL-SCNC: 137 MMOL/L (ref 136–145)
SP GR UR STRIP: 1.03 (ref 1–1.03)
SQUAMOUS #/AREA URNS AUTO: 15 /HPF
TROPONIN I SERPL DL<=0.01 NG/ML-MCNC: <0.006 NG/ML (ref 0–0.03)
URN SPEC COLLECT METH UR: ABNORMAL
WBC # BLD AUTO: 5.03 K/UL (ref 3.9–12.7)
WBC #/AREA URNS AUTO: 27 /HPF (ref 0–5)
YEAST UR QL AUTO: ABNORMAL

## 2023-01-19 PROCEDURE — 99900035 HC TECH TIME PER 15 MIN (STAT): Mod: HCNC

## 2023-01-19 PROCEDURE — 27000221 HC OXYGEN, UP TO 24 HOURS: Mod: HCNC

## 2023-01-19 PROCEDURE — 96372 THER/PROPH/DIAG INJ SC/IM: CPT | Mod: 59

## 2023-01-19 PROCEDURE — 3072F PR LOW RISK FOR RETINOPATHY: ICD-10-PCS | Mod: HCNC,CPTII,S$GLB, | Performed by: PHYSICAL MEDICINE & REHABILITATION

## 2023-01-19 PROCEDURE — 99291 CRITICAL CARE FIRST HOUR: CPT | Mod: HCNC,CS,, | Performed by: STUDENT IN AN ORGANIZED HEALTH CARE EDUCATION/TRAINING PROGRAM

## 2023-01-19 PROCEDURE — 3072F LOW RISK FOR RETINOPATHY: CPT | Mod: HCNC,CPTII,S$GLB, | Performed by: PHYSICAL MEDICINE & REHABILITATION

## 2023-01-19 PROCEDURE — 99999 PR PBB SHADOW E&M-EST. PATIENT-LVL II: CPT | Mod: PBBFAC,HCNC,, | Performed by: PHYSICAL MEDICINE & REHABILITATION

## 2023-01-19 PROCEDURE — 93010 ELECTROCARDIOGRAM REPORT: CPT | Mod: HCNC,,, | Performed by: INTERNAL MEDICINE

## 2023-01-19 PROCEDURE — G0378 HOSPITAL OBSERVATION PER HR: HCPCS | Mod: HCNC

## 2023-01-19 PROCEDURE — 84484 ASSAY OF TROPONIN QUANT: CPT | Mod: HCNC

## 2023-01-19 PROCEDURE — 93010 EKG 12-LEAD: ICD-10-PCS | Mod: HCNC,,, | Performed by: INTERNAL MEDICINE

## 2023-01-19 PROCEDURE — 87086 URINE CULTURE/COLONY COUNT: CPT | Mod: HCNC

## 2023-01-19 PROCEDURE — 87040 BLOOD CULTURE FOR BACTERIA: CPT | Mod: 59,HCNC

## 2023-01-19 PROCEDURE — 25000003 PHARM REV CODE 250: Mod: HCNC

## 2023-01-19 PROCEDURE — 25000242 PHARM REV CODE 250 ALT 637 W/ HCPCS: Mod: HCNC

## 2023-01-19 PROCEDURE — 99999 PR PBB SHADOW E&M-EST. PATIENT-LVL II: ICD-10-PCS | Mod: PBBFAC,HCNC,, | Performed by: PHYSICAL MEDICINE & REHABILITATION

## 2023-01-19 PROCEDURE — 94644 CONT INHLJ TX 1ST HOUR: CPT | Mod: HCNC

## 2023-01-19 PROCEDURE — 96365 THER/PROPH/DIAG IV INF INIT: CPT | Mod: HCNC

## 2023-01-19 PROCEDURE — 93005 ELECTROCARDIOGRAM TRACING: CPT | Mod: HCNC

## 2023-01-19 PROCEDURE — 3008F BODY MASS INDEX DOCD: CPT | Mod: HCNC,CPTII,S$GLB, | Performed by: PHYSICAL MEDICINE & REHABILITATION

## 2023-01-19 PROCEDURE — 94640 AIRWAY INHALATION TREATMENT: CPT | Mod: HCNC

## 2023-01-19 PROCEDURE — 99499 NO LOS: ICD-10-PCS | Mod: HCNC,S$GLB,, | Performed by: PHYSICAL MEDICINE & REHABILITATION

## 2023-01-19 PROCEDURE — 63600175 PHARM REV CODE 636 W HCPCS: Mod: HCNC

## 2023-01-19 PROCEDURE — 99223 1ST HOSP IP/OBS HIGH 75: CPT | Mod: HCNC,,,

## 2023-01-19 PROCEDURE — 0241U SARS-COV2 (COVID) WITH FLU/RSV BY PCR: CPT | Mod: HCNC

## 2023-01-19 PROCEDURE — 83880 ASSAY OF NATRIURETIC PEPTIDE: CPT | Mod: HCNC

## 2023-01-19 PROCEDURE — 85025 COMPLETE CBC W/AUTO DIFF WBC: CPT | Mod: HCNC

## 2023-01-19 PROCEDURE — 94761 N-INVAS EAR/PLS OXIMETRY MLT: CPT | Mod: HCNC

## 2023-01-19 PROCEDURE — 96366 THER/PROPH/DIAG IV INF ADDON: CPT | Mod: HCNC

## 2023-01-19 PROCEDURE — 25000242 PHARM REV CODE 250 ALT 637 W/ HCPCS: Mod: HCNC | Performed by: HOSPITALIST

## 2023-01-19 PROCEDURE — 80053 COMPREHEN METABOLIC PANEL: CPT | Mod: HCNC | Performed by: STUDENT IN AN ORGANIZED HEALTH CARE EDUCATION/TRAINING PROGRAM

## 2023-01-19 PROCEDURE — 96367 TX/PROPH/DG ADDL SEQ IV INF: CPT | Mod: HCNC

## 2023-01-19 PROCEDURE — 83605 ASSAY OF LACTIC ACID: CPT | Mod: HCNC

## 2023-01-19 PROCEDURE — 99499 UNLISTED E&M SERVICE: CPT | Mod: HCNC,S$GLB,, | Performed by: PHYSICAL MEDICINE & REHABILITATION

## 2023-01-19 PROCEDURE — 99291 PR CRITICAL CARE, E/M 30-74 MINUTES: ICD-10-PCS | Mod: HCNC,CS,, | Performed by: STUDENT IN AN ORGANIZED HEALTH CARE EDUCATION/TRAINING PROGRAM

## 2023-01-19 PROCEDURE — 81001 URINALYSIS AUTO W/SCOPE: CPT | Mod: HCNC

## 2023-01-19 PROCEDURE — 94640 AIRWAY INHALATION TREATMENT: CPT | Mod: HCNC,XB

## 2023-01-19 PROCEDURE — 3008F PR BODY MASS INDEX (BMI) DOCUMENTED: ICD-10-PCS | Mod: HCNC,CPTII,S$GLB, | Performed by: PHYSICAL MEDICINE & REHABILITATION

## 2023-01-19 PROCEDURE — 99223 PR INITIAL HOSPITAL CARE,LEVL III: ICD-10-PCS | Mod: HCNC,,,

## 2023-01-19 PROCEDURE — 99285 EMERGENCY DEPT VISIT HI MDM: CPT | Mod: 25,HCNC,CS

## 2023-01-19 RX ORDER — QUETIAPINE FUMARATE 25 MG/1
25 TABLET, FILM COATED ORAL DAILY
Status: DISCONTINUED | OUTPATIENT
Start: 2023-01-20 | End: 2023-01-21 | Stop reason: HOSPADM

## 2023-01-19 RX ORDER — DICLOFENAC SODIUM 10 MG/G
2 GEL TOPICAL 3 TIMES DAILY
Status: DISCONTINUED | OUTPATIENT
Start: 2023-01-19 | End: 2023-01-21 | Stop reason: HOSPADM

## 2023-01-19 RX ORDER — POLYETHYLENE GLYCOL 3350 17 G/17G
17 POWDER, FOR SOLUTION ORAL DAILY PRN
Status: DISCONTINUED | OUTPATIENT
Start: 2023-01-19 | End: 2023-01-21 | Stop reason: HOSPADM

## 2023-01-19 RX ORDER — IBUPROFEN 200 MG
1 TABLET ORAL DAILY
Status: DISCONTINUED | OUTPATIENT
Start: 2023-01-19 | End: 2023-01-19 | Stop reason: SDUPTHER

## 2023-01-19 RX ORDER — PREGABALIN 50 MG/1
50 CAPSULE ORAL 3 TIMES DAILY
Status: DISCONTINUED | OUTPATIENT
Start: 2023-01-19 | End: 2023-01-21 | Stop reason: HOSPADM

## 2023-01-19 RX ORDER — ACETAMINOPHEN 325 MG/1
650 TABLET ORAL EVERY 4 HOURS PRN
Status: DISCONTINUED | OUTPATIENT
Start: 2023-01-19 | End: 2023-01-21 | Stop reason: HOSPADM

## 2023-01-19 RX ORDER — ALBUTEROL SULFATE 2.5 MG/.5ML
15 SOLUTION RESPIRATORY (INHALATION)
Status: COMPLETED | OUTPATIENT
Start: 2023-01-19 | End: 2023-01-19

## 2023-01-19 RX ORDER — CYCLOBENZAPRINE HCL 10 MG
10 TABLET ORAL NIGHTLY
Status: DISCONTINUED | OUTPATIENT
Start: 2023-01-19 | End: 2023-01-21 | Stop reason: HOSPADM

## 2023-01-19 RX ORDER — BISACODYL 10 MG
10 SUPPOSITORY, RECTAL RECTAL DAILY PRN
Status: DISCONTINUED | OUTPATIENT
Start: 2023-01-19 | End: 2023-01-21 | Stop reason: HOSPADM

## 2023-01-19 RX ORDER — LOSARTAN POTASSIUM AND HYDROCHLOROTHIAZIDE 25; 100 MG/1; MG/1
1 TABLET ORAL DAILY
Status: DISCONTINUED | OUTPATIENT
Start: 2023-01-20 | End: 2023-01-21 | Stop reason: HOSPADM

## 2023-01-19 RX ORDER — IBUPROFEN 200 MG
1 TABLET ORAL DAILY
Status: DISCONTINUED | OUTPATIENT
Start: 2023-01-20 | End: 2023-01-21 | Stop reason: HOSPADM

## 2023-01-19 RX ORDER — INSULIN ASPART 100 [IU]/ML
1-10 INJECTION, SOLUTION INTRAVENOUS; SUBCUTANEOUS
Status: DISCONTINUED | OUTPATIENT
Start: 2023-01-19 | End: 2023-01-21 | Stop reason: HOSPADM

## 2023-01-19 RX ORDER — SODIUM CHLORIDE 0.9 % (FLUSH) 0.9 %
5 SYRINGE (ML) INJECTION
Status: DISCONTINUED | OUTPATIENT
Start: 2023-01-19 | End: 2023-01-21 | Stop reason: HOSPADM

## 2023-01-19 RX ORDER — AZELASTINE 1 MG/ML
2 SPRAY, METERED NASAL 2 TIMES DAILY
Status: DISCONTINUED | OUTPATIENT
Start: 2023-01-19 | End: 2023-01-21 | Stop reason: HOSPADM

## 2023-01-19 RX ORDER — AMITRIPTYLINE HYDROCHLORIDE 25 MG/1
25 TABLET, FILM COATED ORAL NIGHTLY PRN
Status: DISCONTINUED | OUTPATIENT
Start: 2023-01-19 | End: 2023-01-21 | Stop reason: HOSPADM

## 2023-01-19 RX ORDER — PREDNISONE 20 MG/1
60 TABLET ORAL
Status: DISCONTINUED | OUTPATIENT
Start: 2023-01-19 | End: 2023-01-19

## 2023-01-19 RX ORDER — PANTOPRAZOLE SODIUM 40 MG/1
40 TABLET, DELAYED RELEASE ORAL 2 TIMES DAILY
Status: DISCONTINUED | OUTPATIENT
Start: 2023-01-19 | End: 2023-01-21 | Stop reason: HOSPADM

## 2023-01-19 RX ORDER — FLUTICASONE FUROATE AND VILANTEROL 200; 25 UG/1; UG/1
1 POWDER RESPIRATORY (INHALATION) DAILY
Status: DISCONTINUED | OUTPATIENT
Start: 2023-01-20 | End: 2023-01-21 | Stop reason: HOSPADM

## 2023-01-19 RX ORDER — ACETAMINOPHEN 500 MG
1000 TABLET ORAL
Status: COMPLETED | OUTPATIENT
Start: 2023-01-19 | End: 2023-01-19

## 2023-01-19 RX ORDER — NALOXONE HCL 0.4 MG/ML
0.02 VIAL (ML) INJECTION
Status: DISCONTINUED | OUTPATIENT
Start: 2023-01-19 | End: 2023-01-21 | Stop reason: HOSPADM

## 2023-01-19 RX ORDER — ONDANSETRON 8 MG/1
8 TABLET, ORALLY DISINTEGRATING ORAL EVERY 8 HOURS PRN
Status: DISCONTINUED | OUTPATIENT
Start: 2023-01-19 | End: 2023-01-21 | Stop reason: HOSPADM

## 2023-01-19 RX ORDER — VENLAFAXINE HYDROCHLORIDE 75 MG/1
75 CAPSULE, EXTENDED RELEASE ORAL DAILY
Status: DISCONTINUED | OUTPATIENT
Start: 2023-01-20 | End: 2023-01-21 | Stop reason: HOSPADM

## 2023-01-19 RX ORDER — FLUTICASONE PROPIONATE 50 MCG
2 SPRAY, SUSPENSION (ML) NASAL 2 TIMES DAILY
Status: DISCONTINUED | OUTPATIENT
Start: 2023-01-19 | End: 2023-01-21 | Stop reason: HOSPADM

## 2023-01-19 RX ORDER — IBUPROFEN 200 MG
24 TABLET ORAL
Status: DISCONTINUED | OUTPATIENT
Start: 2023-01-19 | End: 2023-01-21 | Stop reason: HOSPADM

## 2023-01-19 RX ORDER — MONTELUKAST SODIUM 10 MG/1
10 TABLET ORAL NIGHTLY
Status: DISCONTINUED | OUTPATIENT
Start: 2023-01-19 | End: 2023-01-21 | Stop reason: HOSPADM

## 2023-01-19 RX ORDER — PROCHLORPERAZINE EDISYLATE 5 MG/ML
5 INJECTION INTRAMUSCULAR; INTRAVENOUS EVERY 6 HOURS PRN
Status: DISCONTINUED | OUTPATIENT
Start: 2023-01-19 | End: 2023-01-21 | Stop reason: HOSPADM

## 2023-01-19 RX ORDER — IPRATROPIUM BROMIDE AND ALBUTEROL SULFATE 2.5; .5 MG/3ML; MG/3ML
3 SOLUTION RESPIRATORY (INHALATION)
Status: COMPLETED | OUTPATIENT
Start: 2023-01-19 | End: 2023-01-19

## 2023-01-19 RX ORDER — OXYCODONE AND ACETAMINOPHEN 7.5; 325 MG/1; MG/1
1 TABLET ORAL 3 TIMES DAILY PRN
Status: DISCONTINUED | OUTPATIENT
Start: 2023-01-19 | End: 2023-01-21 | Stop reason: HOSPADM

## 2023-01-19 RX ORDER — CETIRIZINE HYDROCHLORIDE 10 MG/1
10 TABLET ORAL DAILY PRN
Status: DISCONTINUED | OUTPATIENT
Start: 2023-01-19 | End: 2023-01-21 | Stop reason: HOSPADM

## 2023-01-19 RX ORDER — IPRATROPIUM BROMIDE AND ALBUTEROL SULFATE 2.5; .5 MG/3ML; MG/3ML
3 SOLUTION RESPIRATORY (INHALATION) EVERY 4 HOURS
Status: DISCONTINUED | OUTPATIENT
Start: 2023-01-19 | End: 2023-01-19

## 2023-01-19 RX ORDER — CETIRIZINE HYDROCHLORIDE 5 MG/1
5 TABLET ORAL NIGHTLY
Status: DISCONTINUED | OUTPATIENT
Start: 2023-01-19 | End: 2023-01-21 | Stop reason: HOSPADM

## 2023-01-19 RX ORDER — LIDOCAINE 50 MG/G
1 PATCH TOPICAL DAILY
Status: DISCONTINUED | OUTPATIENT
Start: 2023-01-20 | End: 2023-01-21 | Stop reason: HOSPADM

## 2023-01-19 RX ORDER — IPRATROPIUM BROMIDE AND ALBUTEROL SULFATE 2.5; .5 MG/3ML; MG/3ML
3 SOLUTION RESPIRATORY (INHALATION) EVERY 4 HOURS
Status: DISCONTINUED | OUTPATIENT
Start: 2023-01-19 | End: 2023-01-20

## 2023-01-19 RX ORDER — IPRATROPIUM BROMIDE AND ALBUTEROL SULFATE 2.5; .5 MG/3ML; MG/3ML
SOLUTION RESPIRATORY (INHALATION)
Status: COMPLETED
Start: 2023-01-19 | End: 2023-01-19

## 2023-01-19 RX ORDER — METHYLPREDNISOLONE SOD SUCC 125 MG
125 VIAL (EA) INJECTION
Status: COMPLETED | OUTPATIENT
Start: 2023-01-19 | End: 2023-01-19

## 2023-01-19 RX ORDER — ONDANSETRON 4 MG/1
8 TABLET, FILM COATED ORAL EVERY 8 HOURS PRN
Status: DISCONTINUED | OUTPATIENT
Start: 2023-01-19 | End: 2023-01-21 | Stop reason: HOSPADM

## 2023-01-19 RX ORDER — SIMETHICONE 80 MG
1 TABLET,CHEWABLE ORAL 4 TIMES DAILY PRN
Status: DISCONTINUED | OUTPATIENT
Start: 2023-01-19 | End: 2023-01-21 | Stop reason: HOSPADM

## 2023-01-19 RX ORDER — TOPIRAMATE 25 MG/1
100 TABLET ORAL NIGHTLY
Status: DISCONTINUED | OUTPATIENT
Start: 2023-01-19 | End: 2023-01-21 | Stop reason: HOSPADM

## 2023-01-19 RX ORDER — IPRATROPIUM BROMIDE AND ALBUTEROL SULFATE 2.5; .5 MG/3ML; MG/3ML
3 SOLUTION RESPIRATORY (INHALATION) EVERY 4 HOURS PRN
Status: DISCONTINUED | OUTPATIENT
Start: 2023-01-19 | End: 2023-01-20

## 2023-01-19 RX ORDER — GLUCAGON 1 MG
1 KIT INJECTION
Status: DISCONTINUED | OUTPATIENT
Start: 2023-01-19 | End: 2023-01-21 | Stop reason: HOSPADM

## 2023-01-19 RX ORDER — MAGNESIUM SULFATE HEPTAHYDRATE 40 MG/ML
2 INJECTION, SOLUTION INTRAVENOUS ONCE
Status: COMPLETED | OUTPATIENT
Start: 2023-01-19 | End: 2023-01-19

## 2023-01-19 RX ORDER — ENOXAPARIN SODIUM 100 MG/ML
40 INJECTION SUBCUTANEOUS EVERY 12 HOURS
Status: DISCONTINUED | OUTPATIENT
Start: 2023-01-19 | End: 2023-01-21 | Stop reason: HOSPADM

## 2023-01-19 RX ORDER — ACETAMINOPHEN 500 MG
1000 TABLET ORAL EVERY 8 HOURS PRN
Status: DISCONTINUED | OUTPATIENT
Start: 2023-01-19 | End: 2023-01-21 | Stop reason: HOSPADM

## 2023-01-19 RX ORDER — TALC
6 POWDER (GRAM) TOPICAL NIGHTLY PRN
Status: DISCONTINUED | OUTPATIENT
Start: 2023-01-19 | End: 2023-01-21 | Stop reason: HOSPADM

## 2023-01-19 RX ORDER — IBUPROFEN 200 MG
16 TABLET ORAL
Status: DISCONTINUED | OUTPATIENT
Start: 2023-01-19 | End: 2023-01-21 | Stop reason: HOSPADM

## 2023-01-19 RX ORDER — MAG HYDROX/ALUMINUM HYD/SIMETH 200-200-20
30 SUSPENSION, ORAL (FINAL DOSE FORM) ORAL 4 TIMES DAILY PRN
Status: DISCONTINUED | OUTPATIENT
Start: 2023-01-19 | End: 2023-01-21 | Stop reason: HOSPADM

## 2023-01-19 RX ADMIN — CETIRIZINE HYDROCHLORIDE 5 MG: 5 TABLET, FILM COATED ORAL at 09:01

## 2023-01-19 RX ADMIN — PANTOPRAZOLE SODIUM 40 MG: 40 TABLET, DELAYED RELEASE ORAL at 09:01

## 2023-01-19 RX ADMIN — IPRATROPIUM BROMIDE AND ALBUTEROL SULFATE 3 ML: 2.5; .5 SOLUTION RESPIRATORY (INHALATION) at 11:01

## 2023-01-19 RX ADMIN — IPRATROPIUM BROMIDE AND ALBUTEROL SULFATE 3 ML: 2.5; .5 SOLUTION RESPIRATORY (INHALATION) at 06:01

## 2023-01-19 RX ADMIN — ENOXAPARIN SODIUM 40 MG: 40 INJECTION SUBCUTANEOUS at 09:01

## 2023-01-19 RX ADMIN — MAGNESIUM SULFATE HEPTAHYDRATE 2 G: 40 INJECTION, SOLUTION INTRAVENOUS at 12:01

## 2023-01-19 RX ADMIN — DICLOFENAC SODIUM 2 G: 10 GEL TOPICAL at 09:01

## 2023-01-19 RX ADMIN — IBUPROFEN 600 MG: 400 TABLET ORAL at 06:01

## 2023-01-19 RX ADMIN — PREGABALIN 50 MG: 50 CAPSULE ORAL at 09:01

## 2023-01-19 RX ADMIN — AZITHROMYCIN MONOHYDRATE 500 MG: 500 INJECTION, POWDER, LYOPHILIZED, FOR SOLUTION INTRAVENOUS at 03:01

## 2023-01-19 RX ADMIN — CEFTRIAXONE SODIUM 1 G: 1 INJECTION, POWDER, FOR SOLUTION INTRAMUSCULAR; INTRAVENOUS at 03:01

## 2023-01-19 RX ADMIN — IPRATROPIUM BROMIDE AND ALBUTEROL SULFATE 3 ML: .5; 3 SOLUTION RESPIRATORY (INHALATION) at 10:01

## 2023-01-19 RX ADMIN — METHYLPREDNISOLONE SODIUM SUCCINATE 125 MG: 125 INJECTION, POWDER, FOR SOLUTION INTRAMUSCULAR; INTRAVENOUS at 11:01

## 2023-01-19 RX ADMIN — ACETAMINOPHEN 1000 MG: 500 TABLET ORAL at 12:01

## 2023-01-19 RX ADMIN — CYCLOBENZAPRINE 10 MG: 10 TABLET, FILM COATED ORAL at 09:01

## 2023-01-19 RX ADMIN — MONTELUKAST 10 MG: 10 TABLET, FILM COATED ORAL at 09:01

## 2023-01-19 RX ADMIN — IBUPROFEN 600 MG: 400 TABLET ORAL at 11:01

## 2023-01-19 RX ADMIN — ALBUTEROL SULFATE 15 MG: 2.5 SOLUTION RESPIRATORY (INHALATION) at 12:01

## 2023-01-19 RX ADMIN — TOPIRAMATE 100 MG: 25 TABLET, FILM COATED ORAL at 09:01

## 2023-01-19 NOTE — ED NOTES
Pt sleeping in bed.  Resp even/non-labored.  Siderails up x 2/call light in reach.  Will continue to monitor.

## 2023-01-19 NOTE — HPI
Yanni Kaiser is a 52 y.o. female with PMHx of asthma, HTN, HLD, T2DM, depression, SHARATH, GERD and morbid obesity, who presents to the ED with dyspnea x 1 day. Patient stated that she began feeling short of breath at home yesterday and developed a productive cough, with clear/yellow phlegm. This morning, patient noted herself to be very winded after getting ready for the day and dropping off her children. She had a PM&R appointment this morning. She was unable to ambulate from the parking lot to the building, without feeling fully winded. It was recommended that she go to the ED for further evaluation. Patient presented with fever, tachypnea and tachycardia. Received treatment and reported improvement in symptoms. Currently, patient is experiencing dyspnea with exertion, cough and lumbar back pain. Denies chest pain, abdominal pain, recent sick contacts, N/V/D.    ED: Vitals reviewed, , /91, febrile 100.8F, RR 25. CBC and CMP unremarkable. BNP  wnl, troponin negative. Lactate 1.0. Blood cultures ordered. CXR with Central hilar findings are concerning for edema or other nonspecific pneumonitis.  No large focal consolidation.  Correlation is advised in this hypoventilatory exam. EKG with NSR. Received duonebs, steroids and continuous albuterol, noted improvement in dyspnea. Required supplemental O2 via NC.

## 2023-01-19 NOTE — ASSESSMENT & PLAN NOTE
Patient's FSGs are controlled on current medication regimen.  Last A1c reviewed-   Lab Results   Component Value Date    HGBA1C 5.8 (H) 12/20/2022     Most recent fingerstick glucose reviewed- No results for input(s): POCTGLUCOSE in the last 24 hours.  Current correctional scale  Low  Maintain anti-hyperglycemic dose as follows-   Antihyperglycemics (From admission, onward)    Start     Stop Route Frequency Ordered    01/19/23 1706  insulin aspart U-100 pen 1-10 Units         -- SubQ Before meals & nightly PRN 01/19/23 1606        - Hold Oral hypoglycemics while patient is in the hospital.

## 2023-01-19 NOTE — Clinical Note
Diagnosis: Asthma exacerbation [466171]   Future Attending Provider: JOSÉ LUIS RIBERA [[id number]]   Admitting Provider:: JOSÉ LUIS RIBERA [[id number]]   Special Needs:: No Special Needs [1]

## 2023-01-19 NOTE — H&P
[facility] - Emergency Dept  [facility] Medicine  History & Physical    Patient Name: Yanni Kaiser  MRN: [id number]  Patient Class: OP- Observation  Admission Date: 1/19/2023  Attending Physician: Rosaura Farah MD   Primary Care Provider: Carlyle Gordillo MD      Patient information was obtained from patient, past medical records and ER records.     Subjective:     Principal Problem:Asthma exacerbation in COPD    Chief Complaint:   Chief Complaint   Patient presents with    Shortness of Breath     Audible wheezing and stridor heard, hx of asthma +cough         HPI: Yanni Kaiser is a 52 y.o. female with PMHx of asthma, HTN, HLD, T2DM, depression, SHARATH, GERD and morbid obesity, who presents to the ED with dyspnea x 1 day. Patient stated that she began feeling short of breath at home yesterday and developed a productive cough, with clear/yellow phlegm. This morning, patient noted herself to be very winded after getting ready for the day and dropping off her children. She had a PM&R appointment this morning. She was unable to ambulate from the parking lot to the building, without feeling fully winded. It was recommended that she go to the ED for further evaluation. Patient presented with fever, tachypnea and tachycardia. Received treatment and reported improvement in symptoms. Currently, patient is experiencing dyspnea with exertion, cough and lumbar back pain. Denies chest pain, abdominal pain, recent sick contacts, N/V/D.    ED: Vitals reviewed, , /91, febrile 100.8F, RR 25. CBC and CMP unremarkable. BNP  wnl, troponin negative. Lactate 1.0. Blood cultures ordered. CXR with Central hilar findings are concerning for edema or other nonspecific pneumonitis.  No large focal consolidation.  Correlation is advised in this hypoventilatory exam. EKG with NSR. Received duonebs, steroids and continuous albuterol, noted improvement in dyspnea. Required supplemental O2 via NC.       Past Medical History:    Diagnosis Date    Allergy     Anemia     Anxiety     Asthma     Back pain     Chronic bronchitis     Chronic obstructive pulmonary disease, unspecified COPD type 2022    Cigarette smoker     DDD (degenerative disc disease), lumbar 2020    Depression     Diabetes mellitus with peripheral circulatory disorder 2022    Diabetes with neurologic complications     DUB (dysfunctional uterine bleeding) 10/16/2018    GERD (gastroesophageal reflux disease)     High cholesterol     Hyperprolactinemia     Hypertension     Influenza A 2020    Neuromuscular disorder     Obese body habitus     Obesity     Pseudotumor cerebri     Renal manifestation of secondary diabetes mellitus     Respiratory failure     Seizures     Simple endometrial hyperplasia     Sleep apnea     Smoker     Tobacco dependence     Urinary incontinence        Past Surgical History:   Procedure Laterality Date    ANTROSTOMY OF MAXILLARY SINUS AT INFERIOR NASAL MEATUS  10/22/2021    Procedure: MAXILLARY ANTROSTOMY, AT INFERIOR NASAL MEATUS;  Surgeon: John Prado III, MD;  Location: [facility] OR;  Service: ENT;;    BREAST CYST ASPIRATION       SECTION      X 1    CHOLECYSTECTOMY      COLONOSCOPY      COLONOSCOPY N/A 2019    Procedure: COLONOSCOPY;  Surgeon: Jagruti Sweeney MD;  Location: [facility] (2ND FLR);  Service: Endoscopy;  Laterality: N/A;  BMI is 63/gastroparesis/3 days full liquid diet 1 day clear liquid see telephone encounter by Ciara limon    EPIDURAL STEROID INJECTION N/A 2020    Procedure: INJECTION, STEROID, EPIDURAL, L5-S1 IL;  Surgeon: Lawrence Marin MD;  Location: [facility] PAIN MGT;  Service: Pain Management;  Laterality: N/A;    ESOPHAGEAL MANOMETRY WITH MEASUREMENT OF IMPEDANCE N/A 2019    Procedure: MANOMETRY-ESOPHAGEAL-WITH IMPEDANCE;  Surgeon: Jagruti Sweeney MD;  [facility]  Service: Endoscopy;  Laterality: N/A;  Hold  Narcotics x 1 days   Hold TCA x 1 days  Propofol only. No fentanyl or benzodiazepine during sedation. If additional sedation needed, discuss with Dr. Sweeney.  10/29 - pt confirmed appt    ESOPHAGOGASTRODUODENOSCOPY N/A 01/14/2019    Procedure: EGD (ESOPHAGOGASTRODUODENOSCOPY);  Surgeon: Jagruti Sweeney MD;  Location: [facility] (2ND FLR);  Service: Endoscopy;  Laterality: N/A;  BMI is 63/gastroparesis/3 days full liquid diet 1 day clear liquid see telephone encounter by Ciara Brown     ESOPHAGOGASTRODUODENOSCOPY N/A 11/05/2019    Procedure: ESOPHAGOGASTRODUODENOSCOPY (EGD);  Surgeon: Jagruti Sweeney MD;  Location: [facility] (2ND FLR);  Service: Endoscopy;  Laterality: N/A;  EGD with EndoFlip /+/- Botox  2nd floor for gastroparesis/BMI 63 **367lbs**  Bariatric Stretcher needed  Manometry probe to be placed endoscopically during EGD procedure  Due to change in protocol, Full liquid diet for 3 days/ 1 day of clear liquids  Hi    ESOPHAGOGASTRODUODENOSCOPY N/A 12/14/2020    Procedure: ESOPHAGOGASTRODUODENOSCOPY (EGD) with BRAVO;  Surgeon: Jagruti Sweeney MD;  Location: [facility] (2ND FLR);  Service: Endoscopy;  Laterality: N/A;  with Dilation  2nd floor due to BMI 56.20 (327 lbs) and gastroparesis  3 days full liquid diet and 1 day clears    ESOPHAGOGASTRODUODENOSCOPY N/A 04/15/2021    Procedure: ESOPHAGOGASTRODUODENOSCOPY (EGD);  Surgeon: Jagruti Sweeney MD;  Location: [facility] (2ND FLR);  Service: Endoscopy;  Laterality: N/A;  Endoflip  2nd floor-gastroparesis, BMI 57.18, bariatric stretcher  full liquid diet x3 days, clear liquid diet x1 day prior to procedure  propofol only  covid test 4/12 primary care, instructions emailed-[facility]    FOOT FRACTURE SURGERY Left     FUNCTIONAL ENDOSCOPIC SINUS SURGERY (FESS) USING COMPUTER-ASSISTED NAVIGATION Bilateral 10/22/2021    Procedure: FESS, USING COMPUTER-ASSISTED NAVIGATION;  Surgeon: John Prado III, MD;  Location: [facility];  Service: ENT;  Laterality:  Bilateral;  FOLLOW DR PRADO ANESTHESIA PROTOCAL / pt will stay 23 hour post surgery for SHARATH observation    HYSTEROSCOPY WITH DILATION AND CURETTAGE OF UTERUS N/A 10/16/2018    KJB    INJECTION OF ANESTHETIC AGENT AROUND NERVE Bilateral 10/23/2020    Procedure: BLOCK, NERVE  bilateral L3,4,5 MBB;  Surgeon: Lawrence Marin MD;  Location: [facility] PAIN MGT;  Service: Pain Management;  Laterality: Bilateral;  bilateral L3,4,5 MBB   consent needed    INJECTION OF ANESTHETIC AGENT AROUND NERVE Bilateral 02/18/2022    Procedure: BLOCK, NERVE, L3-L4-L5 MEDIAL BRANCH;  Surgeon: Lawrence Marin MD;  Location: [facility] PAIN MGT;  Service: Pain Management;  Laterality: Bilateral;    PLANTAR FASCIOTOMY Left 11/18/2019    Procedure: FASCIOTOMY, PLANTAR;  Surgeon: Valentino Orourke DPM;  Location: [address];  Service: Podiatry;  Laterality: Left;    RETINAL LASER PROCEDURE Left     SINUS SURGERY      SPHENOIDECTOMY Bilateral 10/22/2021    Procedure: SPHENOIDECTOMY;  Surgeon: John Prado III, MD;  Location: [facility];  Service: ENT;  Laterality: Bilateral;    TRANSFORAMINAL EPIDURAL INJECTION OF STEROID Bilateral 06/24/2020    Procedure: INJECTION, STEROID, EPIDURAL, TRANSFORAMINAL APPROACH;  Surgeon: Lawrence Marin MD;  Location: [facility] PAIN MGT;  Service: Pain Management;  Laterality: Bilateral;    TRANSFORAMINAL EPIDURAL INJECTION OF STEROID Bilateral 01/28/2022    Procedure: INJECTION, STEROID, EPIDURAL, TRANSFORAMINAL APPROACH  Bilateral TFESI L4/L5      NEEDS CONSENT;  Surgeon: Lawrence Marin MD;  Location: [facility] PAIN MGT;  Service: Pain Management;  Laterality: Bilateral;    TRANSFORAMINAL EPIDURAL INJECTION OF STEROID Bilateral 12/23/2022    Procedure: INJECTION, STEROID, EPIDURAL, TRANSFORAMINAL APPROACH BILATERAL L4/L5 CONTRAST  NEEDS CONSENT;  Surgeon: Lawrence Marin MD;  Location: [facility] PAIN MGT;  Service: Pain Management;  Laterality: Bilateral;    UPPER GASTROINTESTINAL ENDOSCOPY         Review of  patient's allergies indicates:   Allergen Reactions    Gabapentin Other (See Comments)     Makes her twitch       Current Facility-Administered Medications on File Prior to Encounter   Medication    albuterol inhaler 2 puff    diphenhydrAMINE injection 25 mg    EPINEPHrine (EPIPEN) 0.3 mg/0.3 mL pen injection 0.3 mg    methylPREDNISolone sodium succinate injection 40 mg    ondansetron disintegrating tablet 4 mg    sodium chloride 0.9% 500 mL flush bag    sodium chloride 0.9% flush 10 mL     Current Outpatient Medications on File Prior to Encounter   Medication Sig    albuterol (PROVENTIL/VENTOLIN HFA) 90 mcg/actuation inhaler Inhale 2 puffs into the lungs every 6 (six) hours as needed for Wheezing or Shortness of Breath. Rescue    albuterol-ipratropium (DUO-NEB) 2.5 mg-0.5 mg/3 mL nebulizer solution Take 3 mLs by nebulization every 6 (six) hours as needed for Wheezing. Rescue    alcohol swabs (BD ALCOHOL SWABS) PadM Apply 1 each topically 2 (two) times a day.    amitriptyline (ELAVIL) 25 MG tablet Take 1 tablet (25 mg total) by mouth nightly as needed for Insomnia.    ammonium lactate 12 % Crea Apply twice daily to affected parts both feet as needed.    azelastine (ASTELIN) 137 mcg (0.1 %) nasal spray 2 sprays (274 mcg total) by Nasal route 2 (two) times daily.    blood sugar diagnostic Strp 1 each by Misc.(Non-Drug; Combo Route) route 4 (four) times daily before meals and nightly. ACCU CHEK ELVIA PLUS METER    blood-glucose meter (ACCU-CHEK ELVIA PLUS METER) Misc TEST FOUR TIMES DAILY BEFORE MEALS AND EVERY NIGHT    cetirizine (ZYRTEC) 10 MG tablet Take 1 tablet (10 mg total) by mouth daily as needed for Allergies (itching).    clobetasoL (TEMOVATE) 0.05 % external solution Apply topically 2 (two) times daily. Prn scalp itch or rash    cyclobenzaprine (FLEXERIL) 10 MG tablet     diclofenac sodium (VOLTAREN) 1 % Gel Apply 2 g topically 3 (three) times daily. Apply to painful joints     empagliflozin (JARDIANCE) 10 mg tablet Take 1 tablet (10 mg total) by mouth once daily.    EPINEPHrine (EPIPEN) 0.3 mg/0.3 mL AtIn Inject 0.3 mLs (0.3 mg total) into the muscle once. for 1 dose    fluticasone furoate-vilanteroL (BREO ELLIPTA) 200-25 mcg/dose DsDv diskus inhaler Inhale 1 puff into the lungs once daily. Controller    fluticasone propionate (FLONASE) 50 mcg/actuation nasal spray 2 sprays (100 mcg total) by Each Nostril route 2 (two) times a day.    lancets (ACCU-CHEK FASTCLIX LANCET DRUM) Misc 1 Device by Misc.(Non-Drug; Combo Route) route 2 (two) times a day.    lancets (ACCU-CHEK SOFTCLIX LANCETS) Misc 1 Device by Misc.(Non-Drug; Combo Route) route 4 (four) times daily.    levocetirizine (XYZAL) 5 MG tablet Take 1 tablet (5 mg total) by mouth every evening.    lidocaine (LIDODERM) 5 % Place 1 patch onto the skin once daily. Remove & Discard patch within 12 hours or as directed by MD    LIDOcaine HCL 2% (XYLOCAINE) 2 % jelly Apply topically as needed. Apply topically once nightly to affected part of foot/feet.    LINZESS 145 mcg Cap capsule Take 1 capsule (145 mcg total) by mouth once daily.    losartan-hydrochlorothiazide 100-25 mg (HYZAAR) 100-25 mg per tablet Take 1 tablet by mouth once daily.    montelukast (SINGULAIR) 10 mg tablet Take 1 tablet (10 mg total) by mouth every evening.    nicotine (NICODERM CQ) 21 mg/24 hr Place 1 patch onto the skin once daily.    nystatin-triamcinolone (MYCOLOG) ointment Apply topically 2 (two) times daily.    ondansetron (ZOFRAN) 8 MG tablet Take 1 tablet (8 mg total) by mouth every 8 (eight) hours as needed for Nausea.    oxyCODONE-acetaminophen (PERCOCET) 7.5-325 mg per tablet Take 1 tablet by mouth 3 (three) times daily as needed for Pain.    pantoprazole (PROTONIX) 40 MG tablet Take 1 tablet (40 mg total) by mouth 2 (two) times daily.    pregabalin (LYRICA) 50 MG capsule Take 1 capsule (50 mg total) by mouth 3 (three) times daily.     "QUEtiapine (SEROQUEL) 25 MG Tab Take 1 tablet (25 mg total) by mouth once daily.    semaglutide 2 mg/dose (8 mg/3 mL) PnIj Inject 2 mg into the skin every 7 days.    sulindac (CLINORIL) 200 MG Tab Take 1 tablet (200 mg total) by mouth 2 (two) times daily.    topiramate (TOPAMAX) 50 MG tablet Take 2 tablets (100 mg total) by mouth every evening.    triamcinolone acetonide 0.1% (KENALOG) 0.1 % cream Apply topically 2 (two) times daily. Prn focally for rash spots on forehead and legs.Stop using steroid topical when skin is smooth and non itchy.  Do not treat dark or red coloring.    venlafaxine (EFFEXOR-XR) 75 MG 24 hr capsule Take 1 capsule (75 mg total) by mouth once daily.     Family History       Problem Relation (Age of Onset)    Breast cancer Maternal Grandmother    COPD Father    Cancer Maternal Grandmother    Cataracts Mother, Father    Colon cancer Mother (50), Maternal Grandmother    Colon polyps Mother, Maternal Grandmother    Diabetes Mother, Daughter, Daughter    Glaucoma Father    Heart attack Father    Heart disease Father    Hypertension Mother, Father, Daughter    No Known Problems Brother, Daughter, Son, Maternal Aunt, Maternal Uncle, Paternal Aunt, Paternal Uncle, Maternal Grandfather, Paternal Grandmother, Paternal Grandfather    Obesity Daughter    Ulcers Father          Tobacco Use    Smoking status: Every Day     Packs/day: 0.25     Years: 27.00     Pack years: 6.75     Types: Cigarettes     Start date: 1/1/1992     Passive exposure: Current    Smokeless tobacco: Never    Tobacco comments:     "1/2 pack per every 2 days or so"           10/21/22 patient says its less than half a pack now. Has been cutting back.   Substance and Sexual Activity    Alcohol use: Yes     Alcohol/week: 1.0 standard drink     Types: 1 Shots of liquor per week     Comment: occasionally    Drug use: No    Sexual activity: Yes     Partners: Male     Birth control/protection: None     Comment:      Review " of Systems   Constitutional:  Positive for fatigue. Negative for activity change, chills and fever.   HENT:  Positive for sinus pressure (resolved). Negative for trouble swallowing.    Eyes:  Negative for photophobia and visual disturbance.   Respiratory:  Positive for cough and shortness of breath. Negative for chest tightness.    Cardiovascular:  Negative for chest pain, palpitations and leg swelling.   Gastrointestinal:  Negative for abdominal pain, constipation, diarrhea, nausea and vomiting.   Genitourinary:  Negative for dysuria, frequency and hematuria.   Musculoskeletal:  Positive for arthralgias and back pain. Negative for gait problem and neck pain.   Skin:  Negative for rash and wound.   Neurological:  Negative for dizziness, syncope, speech difficulty and light-headedness.   Psychiatric/Behavioral:  Negative for agitation and confusion. The patient is not nervous/anxious.    Objective:     Vital Signs (Most Recent):  Temp: 98.4 °F (36.9 °C) (01/19/23 1403)  Pulse: 108 (01/19/23 1406)  Resp: 20 (01/19/23 1228)  BP: (!) 142/76 (01/19/23 1406)  SpO2: 96 % (01/19/23 1406)   Vital Signs (24h Range):  Temp:  [98.4 °F (36.9 °C)-100.8 °F (38.2 °C)] 98.4 °F (36.9 °C)  Pulse:  [] 108  Resp:  [20-25] 20  SpO2:  [93 %-98 %] 96 %  BP: (130-173)/() 142/76        There is no height or weight on file to calculate BMI.    Physical Exam  Vitals and nursing note reviewed.   Constitutional:       General: She is not in acute distress.     Appearance: She is well-developed. She is obese.   HENT:      Head: Normocephalic and atraumatic.      Mouth/Throat:      Pharynx: No oropharyngeal exudate.   Eyes:      Conjunctiva/sclera: Conjunctivae normal.      Pupils: Pupils are equal, round, and reactive to light.   Cardiovascular:      Rate and Rhythm: Regular rhythm. Tachycardia present.      Heart sounds: Normal heart sounds.   Pulmonary:      Effort: Pulmonary effort is normal. No respiratory distress.      Breath  sounds: Wheezing present.      Comments: Audible wheezes, auscultation difficult due to body habitus  Wearing nasal cannula  Abdominal:      General: Bowel sounds are normal. There is no distension.      Palpations: Abdomen is soft.      Tenderness: There is no abdominal tenderness.   Musculoskeletal:         General: Tenderness present. Normal range of motion.      Cervical back: Normal range of motion and neck supple.      Right lower leg: Edema present.      Left lower leg: Edema present.   Lymphadenopathy:      Cervical: No cervical adenopathy.   Skin:     General: Skin is warm and dry.      Capillary Refill: Capillary refill takes less than 2 seconds.      Findings: No rash.   Neurological:      General: No focal deficit present.      Mental Status: She is alert and oriented to person, place, and time.      Cranial Nerves: No cranial nerve deficit.      Sensory: No sensory deficit.      Coordination: Coordination normal.   Psychiatric:         Behavior: Behavior normal.         Thought Content: Thought content normal.         Judgment: Judgment normal.         CRANIAL NERVES     CN III, IV, VI   Pupils are equal, round, and reactive to light.     Significant Labs: All pertinent labs within the past 24 hours have been reviewed.  CBC:   Recent Labs   Lab 01/19/23  1202   WBC 5.03   HGB 12.1   HCT 40.4        CMP:   Recent Labs   Lab 01/19/23  1322      K 4.1      CO2 21*   *   BUN 12   CREATININE 1.3   CALCIUM 8.7   PROT 6.9   ALBUMIN 3.2*   BILITOT 0.4   ALKPHOS 74   AST 23   ALT 30   ANIONGAP 6*     Cardiac Markers:   Recent Labs   Lab 01/19/23  1202   BNP <10     Lactic Acid:   Recent Labs   Lab 01/19/23  1202   LACTATE 1.0     Troponin:   Recent Labs   Lab 01/19/23  1202   TROPONINI <0.006       Significant Imaging: I have reviewed all pertinent imaging results/findings within the past 24 hours.  X-Ray Chest 1 View  Narrative: EXAMINATION:  XR CHEST 1 VIEW    CLINICAL HISTORY:  sob;  Shortness of breath    TECHNIQUE:  Single frontal view of the chest was performed.    COMPARISON:  10/06/2022    FINDINGS:  The cardiomediastinal silhouette is not enlarged, magnified by technique..  There is no pleural effusion.  The trachea is midline.  The lungs are symmetrically expanded bilaterally with prominent central hilar interstitial attenuation.  No large focal consolidation seen.  There is no pneumothorax.  The osseous structures are remarkable for degenerative change..  Impression: 1. Central hilar findings are concerning for edema or other nonspecific pneumonitis.  No large focal consolidation.  Correlation is advised in this hypoventilatory exam.    Electronically signed by: Froylan Jack MD  Date:    01/19/2023  Time:    13:47        Assessment/Plan:     * Asthma exacerbation in COPD  COPD exacerbation  Moderate persistent asthma without complication    52 y.o. female admitted to  observation with asthma exacerbation with COPD. Patient experiencing dyspnea upon exertion, wheezing, tachycardia, tachypnea and fever. Received duonebs, magnesium, steroids and continuous albuterol in the ED, noted improvement in dyspnea. Required supplemental O2 via NC to main oxygen saturation.     - CXR with Central hilar findings are concerning for edema or other nonspecific pneumonitis.  No large focal consolidation.  - Continue home inhalers: breo, albuterol  - Continue singulair 10 mg qhs  - Scheduled duonebs q4h  - Start prednisone 60 mg x 5 days  - Supplemental oxygen via NC for SpO2>90%  - Start IV azithromycin and IV Ceftriaxone for CAP coverage    Chronic left-sided low back pain with sciatica  - Continue lyrica 50 mg TID  - Continue lidocaine patches and Voltaren gel    Stage 3 chronic kidney disease  - Cr 1.3, eGFR 56 at time of admission  - Monitor with daily BMP  - Avoid nephrotoxins  - Renally dose all medications    Recurrent major depressive disorder, in full remission  Patient has persistent  depression which is moderate and is currently controlled. Will Continue anti-depressant medications. We will not consult psychiatry at this time. Patient does not display psychosis at this time. Continue to monitor closely and adjust plan of care as needed.  - Continue amitriptyline 25 mg, qhs PRN for insomnia  - Continue Seroquel 25 mg daily  - Continue venlafaxine 75 mg daily    Type 2 diabetes mellitus with stage 3 chronic kidney disease, with long-term current use of insulin  Patient's FSGs are controlled on current medication regimen.  Last A1c reviewed-   Lab Results   Component Value Date    HGBA1C 5.8 (H) 12/20/2022     Most recent fingerstick glucose reviewed- No results for input(s): POCTGLUCOSE in the last 24 hours.  Current correctional scale  Low  Maintain anti-hyperglycemic dose as follows-   Antihyperglycemics (From admission, onward)    Start     Stop Route Frequency Ordered    01/19/23 1706  insulin aspart U-100 pen 1-10 Units         -- SubQ Before meals & nightly PRN 01/19/23 1606        - Hold Oral hypoglycemics while patient is in the hospital.    GERD (gastroesophageal reflux disease)  - Continue protonix 40 mg BID    Essential hypertension  - Continue Hyzaar 100-25 mg daily  - Monitor BP with q4h vitals    Anemia of chronic disease  - CBC reviewed, H/H 12.1/40.4  - Stable, follow with daily CBC    Cigarette nicotine dependence without complication  - Patient endorses currently smoking   - Nicotine patch ordered    Morbid obesity  Body mass index is 61.3 kg/m². Morbid obesity complicates all aspects of disease management from diagnostic modalities to treatment. Weight loss encouraged and health benefits explained to patient.     SHARATH (obstructive sleep apnea)  - Patient endorses not using CPAP qhs, as prescribed  - Order CPAP qhs if warranted      VTE Risk Mitigation (From admission, onward)         Ordered     enoxaparin injection 40 mg  Every 12 hours         01/19/23 1546     IP VTE HIGH RISK  PATIENT  Once         01/19/23 1546     Place DANA hose  Until discontinued         01/19/23 1546                   Makenzie Roman PA-C  Department of Hospital Medicine   [facility] - Emergency Dept

## 2023-01-19 NOTE — ASSESSMENT & PLAN NOTE
COPD exacerbation  Moderate persistent asthma without complication    52 y.o. female admitted to  observation with asthma exacerbation with COPD. Patient experiencing dyspnea upon exertion, wheezing, tachycardia, tachypnea and fever. Received duonebs, magnesium, steroids and continuous albuterol in the ED, noted improvement in dyspnea. Required supplemental O2 via NC to main oxygen saturation.     - CXR with Central hilar findings are concerning for edema or other nonspecific pneumonitis.  No large focal consolidation.  - Continue home inhalers: breo, albuterol  - Continue singulair 10 mg qhs  - Scheduled duonebs q4h  - Start prednisone 60 mg x 5 days  - Supplemental oxygen via NC for SpO2>90%  - Start IV azithromycin and IV Ceftriaxone for CAP coverage

## 2023-01-19 NOTE — ASSESSMENT & PLAN NOTE
Body mass index is 61.3 kg/m². Morbid obesity complicates all aspects of disease management from diagnostic modalities to treatment. Weight loss encouraged and health benefits explained to patient.

## 2023-01-19 NOTE — ASSESSMENT & PLAN NOTE
52 y.o. female admitted to  observation with asthma exacerbation with COPD. Patient experiencing dyspnea upon exertion, wheezing, tachycardia, tachypnea and fever. Received duonebs, magnesium, steroids and continuous albuterol in the ED, noted improvement in dyspnea. Required supplemental O2 via NC to main oxygen saturation.     - CXR with Central hilar findings are concerning for edema or other nonspecific pneumonitis.  No large focal consolidation.  - Continue home inhalers: breo, albuterol  - Continue singulair 10 mg qhs  - Scheduled duonebs q4h  - Start prednisone 60 mg x 5 days  - Supplemental oxygen via NC for SpO2>90%  - Start IV azithromycin and IV Ceftriaxone for CAP coverage   Genetics  [facility]     Contact our nurse care coordinator Letha RAEN, RN, PHN at [phone] or send a PicLyf message for any non-urgent general or medical questions.     If you had genetic testing and have further questions, please contact the genetic counselor:    Maylin Haro  Ph: [phone]    To schedule appointments:  Pediatric Call Center for [facility]: [phone]  Neuropsychology [phone]  Radiology/ Imaging/Echocardiogram: [phone]   Services:   [phone]     You should receive a phone call about your next appointment. If you do not receive this within two weeks of your visit, please call [phone].     If you have not already done so consider signing up for [employer] by speaking with the person at the  on your way out or go to [email or web address] to sign up online.     [employer] enables easy and confidential communication with your care team.

## 2023-01-19 NOTE — ASSESSMENT & PLAN NOTE
Patient has persistent depression which is moderate and is currently controlled. Will Continue anti-depressant medications. We will not consult psychiatry at this time. Patient does not display psychosis at this time. Continue to monitor closely and adjust plan of care as needed.  - Continue amitriptyline 25 mg, qhs PRN for insomnia  - Continue Seroquel 25 mg daily  - Continue venlafaxine 75 mg daily

## 2023-01-19 NOTE — ASSESSMENT & PLAN NOTE
- Cr 1.3, eGFR 56 at time of admission  - Monitor with daily BMP  - Avoid nephrotoxins  - Renally dose all medications

## 2023-01-19 NOTE — PROGRESS NOTES
Patient was here today for appointment for pain management.  However, he is wheezing and having frequent severe cough.  She is dyspneic.  She was referred to emergency department for management of asthma exacerbation.  We can see her at a later time for pain management.

## 2023-01-19 NOTE — ED TRIAGE NOTES
Patient identifiers for Yanni Kaiser 52 y.o. female checked and correct.  Chief Complaint   Patient presents with    Shortness of Breath     Audible wheezing and stridor heard, hx of asthma +cough      Past Medical History:   Diagnosis Date    Allergy     Anemia     Anxiety     Asthma     Back pain     Chronic bronchitis     Chronic obstructive pulmonary disease, unspecified COPD type 4/1/2022    Cigarette smoker     DDD (degenerative disc disease), lumbar 6/9/2020    Depression     Diabetes mellitus with peripheral circulatory disorder 4/1/2022    Diabetes with neurologic complications     DUB (dysfunctional uterine bleeding) 10/16/2018    GERD (gastroesophageal reflux disease)     High cholesterol     Hyperprolactinemia     Hypertension     Influenza A 01/16/2020    Neuromuscular disorder     Obese body habitus     Obesity     Pseudotumor cerebri     Renal manifestation of secondary diabetes mellitus     Respiratory failure     Seizures     Simple endometrial hyperplasia 2018    Sleep apnea     Smoker     Tobacco dependence     Urinary incontinence      Allergies reported:   Review of patient's allergies indicates:   Allergen Reactions    Gabapentin Other (See Comments)     Makes her twitch         LOC: Patient is awake, alert, and aware of environment with an appropriate affect. Patient is oriented x 4 and speaking appropriately.  APPEARANCE: Patient in  acute distress. Patient is clean and well groomed, patient's clothing is properly fastened.  SKIN: The skin is warm and dry. Patient has normal skin turgor and moist mucus membranes.   MUSKULOSKELETAL: Patient is moving all extremities well, no obvious deformities noted. Pulses intact.   RESPIRATORY: Airway is open and patent. Respirations are spontaneous and labored.   CARDIAC: Patient has a normal rate and rhythm. on cardiac monitor. No peripheral edema noted. Reports chest pain  ABDOMEN: No distention noted. Soft and non-tender upon  palpation.  NEUROLOGICAL: PERRL. Facial expression is symmetrical. Hand grasps are equal bilaterally. Normal sensation in all extremities when touched with finger.

## 2023-01-19 NOTE — SUBJECTIVE & OBJECTIVE
Past Medical History:   Diagnosis Date    Allergy     Anemia     Anxiety     Asthma     Back pain     Chronic bronchitis     Chronic obstructive pulmonary disease, unspecified COPD type 2022    Cigarette smoker     DDD (degenerative disc disease), lumbar 2020    Depression     Diabetes mellitus with peripheral circulatory disorder 2022    Diabetes with neurologic complications     DUB (dysfunctional uterine bleeding) 10/16/2018    GERD (gastroesophageal reflux disease)     High cholesterol     Hyperprolactinemia     Hypertension     Influenza A 2020    Neuromuscular disorder     Obese body habitus     Obesity     Pseudotumor cerebri     Renal manifestation of secondary diabetes mellitus     Respiratory failure     Seizures     Simple endometrial hyperplasia     Sleep apnea     Smoker     Tobacco dependence     Urinary incontinence        Past Surgical History:   Procedure Laterality Date    ANTROSTOMY OF MAXILLARY SINUS AT INFERIOR NASAL MEATUS  10/22/2021    Procedure: MAXILLARY ANTROSTOMY, AT INFERIOR NASAL MEATUS;  Surgeon: John Prado III, MD;  Location: [facility] OR;  Service: ENT;;    BREAST CYST ASPIRATION       SECTION      X 1    CHOLECYSTECTOMY      COLONOSCOPY      COLONOSCOPY N/A 2019    Procedure: COLONOSCOPY;  Surgeon: Jagruti Sweeney MD;  Location: [facility] ([address] [place] [address]);  Service: Endoscopy;  Laterality: N/A;  BMI is 63/gastroparesis/3 days full liquid diet 1 day clear liquid see telephone encounter by Ciara Brown     EPIDURAL STEROID INJECTION N/A 2020    Procedure: INJECTION, STEROID, EPIDURAL, L5-S1 IL;  Surgeon: Lawrence Marin MD;  Location: [facility] PAIN MGT;  Service: Pain Management;  Laterality: N/A;    ESOPHAGEAL MANOMETRY WITH MEASUREMENT OF IMPEDANCE N/A 2019    Procedure: MANOMETRY-ESOPHAGEAL-WITH IMPEDANCE;  Surgeon: Jagruti Sweeney MD;  [facility]  Service: Endoscopy;  Laterality: N/A;  Hold Narcotics x 1 days    Hold TCA x 1 days  Propofol only. No fentanyl or benzodiazepine during sedation. If additional sedation needed, discuss with Dr. Sweeney.  10/29 - pt confirmed appt    ESOPHAGOGASTRODUODENOSCOPY N/A 01/14/2019    Procedure: EGD (ESOPHAGOGASTRODUODENOSCOPY);  Surgeon: Jagruti Sweeney MD;  Location: [facility] (2ND FLR);  Service: Endoscopy;  Laterality: N/A;  BMI is 63/gastroparesis/3 days full liquid diet 1 day clear liquid see telephone encounter by Ciara Brown     ESOPHAGOGASTRODUODENOSCOPY N/A 11/05/2019    Procedure: ESOPHAGOGASTRODUODENOSCOPY (EGD);  Surgeon: Jagruti Sweeney MD;  Location: [facility] (2ND FLR);  Service: Endoscopy;  Laterality: N/A;  EGD with EndoFlip /+/- Botox  2nd floor for gastroparesis/BMI 63 **367lbs**  Bariatric Stretcher needed  Manometry probe to be placed endoscopically during EGD procedure  Due to change in protocol, Full liquid diet for 3 days/ 1 day of clear liquids  Hi    ESOPHAGOGASTRODUODENOSCOPY N/A 12/14/2020    Procedure: ESOPHAGOGASTRODUODENOSCOPY (EGD) with BRAVO;  Surgeon: Jagruti Sweeney MD;  Location: [facility] (2ND FLR);  Service: Endoscopy;  Laterality: N/A;  with Dilation  2nd floor due to BMI 56.20 (327 lbs) and gastroparesis  3 days full liquid diet and 1 day clears    ESOPHAGOGASTRODUODENOSCOPY N/A 04/15/2021    Procedure: ESOPHAGOGASTRODUODENOSCOPY (EGD);  Surgeon: Jagruti Sweeney MD;  Location: [facility] ENDO (2ND FLR);  Service: Endoscopy;  Laterality: N/A;  Endoflip  2nd floor-gastroparesis, BMI 57.18, bariatric stretcher  full liquid diet x3 days, clear liquid diet x1 day prior to procedure  propofol only  covid test 4/12 primary care, instructions emailed-[facility]    FOOT FRACTURE SURGERY Left     FUNCTIONAL ENDOSCOPIC SINUS SURGERY (FESS) USING COMPUTER-ASSISTED NAVIGATION Bilateral 10/22/2021    Procedure: FESS, USING COMPUTER-ASSISTED NAVIGATION;  Surgeon: John Prado III, MD;  Location: [facility] OR;  Service: ENT;  Laterality: Bilateral;  FOLLOW   JASON MCNULTY PROTOCAL / pt will stay 23 hour post surgery for SHARATH observation    HYSTEROSCOPY WITH DILATION AND CURETTAGE OF UTERUS N/A 10/16/2018    KJB    INJECTION OF ANESTHETIC AGENT AROUND NERVE Bilateral 10/23/2020    Procedure: BLOCK, NERVE  bilateral L3,4,5 MBB;  Surgeon: Lawrence Marin MD;  Location: [facility] PAIN MGT;  Service: Pain Management;  Laterality: Bilateral;  bilateral L3,4,5 MBB   consent needed    INJECTION OF ANESTHETIC AGENT AROUND NERVE Bilateral 02/18/2022    Procedure: BLOCK, NERVE, L3-L4-L5 MEDIAL BRANCH;  Surgeon: Lawrence Marin MD;  Location: [facility] PAIN MGT;  Service: Pain Management;  Laterality: Bilateral;    PLANTAR FASCIOTOMY Left 11/18/2019    Procedure: FASCIOTOMY, PLANTAR;  Surgeon: Valentino Orourke DPM;  Location: [address];  Service: Podiatry;  Laterality: Left;    RETINAL LASER PROCEDURE Left     SINUS SURGERY      SPHENOIDECTOMY Bilateral 10/22/2021    Procedure: SPHENOIDECTOMY;  Surgeon: John Prado III, MD;  Location: [facility];  Service: ENT;  Laterality: Bilateral;    TRANSFORAMINAL EPIDURAL INJECTION OF STEROID Bilateral 06/24/2020    Procedure: INJECTION, STEROID, EPIDURAL, TRANSFORAMINAL APPROACH;  Surgeon: Lawrence Marin MD;  Location: [facility] PAIN MGT;  Service: Pain Management;  Laterality: Bilateral;    TRANSFORAMINAL EPIDURAL INJECTION OF STEROID Bilateral 01/28/2022    Procedure: INJECTION, STEROID, EPIDURAL, TRANSFORAMINAL APPROACH  Bilateral TFESI L4/L5      NEEDS CONSENT;  Surgeon: Lawrence Marin MD;  Location: [facility] PAIN MGT;  Service: Pain Management;  Laterality: Bilateral;    TRANSFORAMINAL EPIDURAL INJECTION OF STEROID Bilateral 12/23/2022    Procedure: INJECTION, STEROID, EPIDURAL, TRANSFORAMINAL APPROACH BILATERAL L4/L5 CONTRAST  NEEDS CONSENT;  Surgeon: Lawrence Marin MD;  Location: [facility] PAIN MGT;  Service: Pain Management;  Laterality: Bilateral;    UPPER GASTROINTESTINAL ENDOSCOPY         Review of patient's allergies indicates:    Allergen Reactions    Gabapentin Other (See Comments)     Makes her twitch       Current Facility-Administered Medications on File Prior to Encounter   Medication    albuterol inhaler 2 puff    diphenhydrAMINE injection 25 mg    EPINEPHrine (EPIPEN) 0.3 mg/0.3 mL pen injection 0.3 mg    methylPREDNISolone sodium succinate injection 40 mg    ondansetron disintegrating tablet 4 mg    sodium chloride 0.9% 500 mL flush bag    sodium chloride 0.9% flush 10 mL     Current Outpatient Medications on File Prior to Encounter   Medication Sig    albuterol (PROVENTIL/VENTOLIN HFA) 90 mcg/actuation inhaler Inhale 2 puffs into the lungs every 6 (six) hours as needed for Wheezing or Shortness of Breath. Rescue    albuterol-ipratropium (DUO-NEB) 2.5 mg-0.5 mg/3 mL nebulizer solution Take 3 mLs by nebulization every 6 (six) hours as needed for Wheezing. Rescue    alcohol swabs (BD ALCOHOL SWABS) PadM Apply 1 each topically 2 (two) times a day.    amitriptyline (ELAVIL) 25 MG tablet Take 1 tablet (25 mg total) by mouth nightly as needed for Insomnia.    ammonium lactate 12 % Crea Apply twice daily to affected parts both feet as needed.    azelastine (ASTELIN) 137 mcg (0.1 %) nasal spray 2 sprays (274 mcg total) by Nasal route 2 (two) times daily.    blood sugar diagnostic Strp 1 each by Misc.(Non-Drug; Combo Route) route 4 (four) times daily before meals and nightly. ACCU CHEK ELVIA PLUS METER    blood-glucose meter (ACCU-CHEK ELVIA PLUS METER) Misc TEST FOUR TIMES DAILY BEFORE MEALS AND EVERY NIGHT    cetirizine (ZYRTEC) 10 MG tablet Take 1 tablet (10 mg total) by mouth daily as needed for Allergies (itching).    clobetasoL (TEMOVATE) 0.05 % external solution Apply topically 2 (two) times daily. Prn scalp itch or rash    cyclobenzaprine (FLEXERIL) 10 MG tablet     diclofenac sodium (VOLTAREN) 1 % Gel Apply 2 g topically 3 (three) times daily. Apply to painful joints    empagliflozin (JARDIANCE) 10 mg tablet Take 1 tablet (10 mg  total) by mouth once daily.    EPINEPHrine (EPIPEN) 0.3 mg/0.3 mL AtIn Inject 0.3 mLs (0.3 mg total) into the muscle once. for 1 dose    fluticasone furoate-vilanteroL (BREO ELLIPTA) 200-25 mcg/dose DsDv diskus inhaler Inhale 1 puff into the lungs once daily. Controller    fluticasone propionate (FLONASE) 50 mcg/actuation nasal spray 2 sprays (100 mcg total) by Each Nostril route 2 (two) times a day.    lancets (ACCU-CHEK FASTCLIX LANCET DRUM) Misc 1 Device by Misc.(Non-Drug; Combo Route) route 2 (two) times a day.    lancets (ACCU-CHEK SOFTCLIX LANCETS) Misc 1 Device by Misc.(Non-Drug; Combo Route) route 4 (four) times daily.    levocetirizine (XYZAL) 5 MG tablet Take 1 tablet (5 mg total) by mouth every evening.    lidocaine (LIDODERM) 5 % Place 1 patch onto the skin once daily. Remove & Discard patch within 12 hours or as directed by MD    LIDOcaine HCL 2% (XYLOCAINE) 2 % jelly Apply topically as needed. Apply topically once nightly to affected part of foot/feet.    LINZESS 145 mcg Cap capsule Take 1 capsule (145 mcg total) by mouth once daily.    losartan-hydrochlorothiazide 100-25 mg (HYZAAR) 100-25 mg per tablet Take 1 tablet by mouth once daily.    montelukast (SINGULAIR) 10 mg tablet Take 1 tablet (10 mg total) by mouth every evening.    nicotine (NICODERM CQ) 21 mg/24 hr Place 1 patch onto the skin once daily.    nystatin-triamcinolone (MYCOLOG) ointment Apply topically 2 (two) times daily.    ondansetron (ZOFRAN) 8 MG tablet Take 1 tablet (8 mg total) by mouth every 8 (eight) hours as needed for Nausea.    oxyCODONE-acetaminophen (PERCOCET) 7.5-325 mg per tablet Take 1 tablet by mouth 3 (three) times daily as needed for Pain.    pantoprazole (PROTONIX) 40 MG tablet Take 1 tablet (40 mg total) by mouth 2 (two) times daily.    pregabalin (LYRICA) 50 MG capsule Take 1 capsule (50 mg total) by mouth 3 (three) times daily.    QUEtiapine (SEROQUEL) 25 MG Tab Take 1 tablet (25 mg total) by mouth once daily.     "semaglutide 2 mg/dose (8 mg/3 mL) PnIj Inject 2 mg into the skin every 7 days.    sulindac (CLINORIL) 200 MG Tab Take 1 tablet (200 mg total) by mouth 2 (two) times daily.    topiramate (TOPAMAX) 50 MG tablet Take 2 tablets (100 mg total) by mouth every evening.    triamcinolone acetonide 0.1% (KENALOG) 0.1 % cream Apply topically 2 (two) times daily. Prn focally for rash spots on forehead and legs.Stop using steroid topical when skin is smooth and non itchy.  Do not treat dark or red coloring.    venlafaxine (EFFEXOR-XR) 75 MG 24 hr capsule Take 1 capsule (75 mg total) by mouth once daily.     Family History       Problem Relation (Age of Onset)    Breast cancer Maternal Grandmother    COPD Father    Cancer Maternal Grandmother    Cataracts Mother, Father    Colon cancer Mother (50), Maternal Grandmother    Colon polyps Mother, Maternal Grandmother    Diabetes Mother, Daughter, Daughter    Glaucoma Father    Heart attack Father    Heart disease Father    Hypertension Mother, Father, Daughter    No Known Problems Brother, Daughter, Son, Maternal Aunt, Maternal Uncle, Paternal Aunt, Paternal Uncle, Maternal Grandfather, Paternal Grandmother, Paternal Grandfather    Obesity Daughter    Ulcers Father          Tobacco Use    Smoking status: Every Day     Packs/day: 0.25     Years: 27.00     Pack years: 6.75     Types: Cigarettes     Start date: 1/1/1992     Passive exposure: Current    Smokeless tobacco: Never    Tobacco comments:     "1/2 pack per every 2 days or so"           10/21/22 patient says its less than half a pack now. Has been cutting back.   Substance and Sexual Activity    Alcohol use: Yes     Alcohol/week: 1.0 standard drink     Types: 1 Shots of liquor per week     Comment: occasionally    Drug use: No    Sexual activity: Yes     Partners: Male     Birth control/protection: None     Comment:      Review of Systems   Constitutional:  Positive for fatigue. Negative for activity change, chills and " fever.   HENT:  Positive for sinus pressure (resolved). Negative for trouble swallowing.    Eyes:  Negative for photophobia and visual disturbance.   Respiratory:  Positive for cough and shortness of breath. Negative for chest tightness.    Cardiovascular:  Negative for chest pain, palpitations and leg swelling.   Gastrointestinal:  Negative for abdominal pain, constipation, diarrhea, nausea and vomiting.   Genitourinary:  Negative for dysuria, frequency and hematuria.   Musculoskeletal:  Positive for arthralgias and back pain. Negative for gait problem and neck pain.   Skin:  Negative for rash and wound.   Neurological:  Negative for dizziness, syncope, speech difficulty and light-headedness.   Psychiatric/Behavioral:  Negative for agitation and confusion. The patient is not nervous/anxious.    Objective:     Vital Signs (Most Recent):  Temp: 98.4 °F (36.9 °C) (01/19/23 1403)  Pulse: 108 (01/19/23 1406)  Resp: 20 (01/19/23 1228)  BP: (!) 142/76 (01/19/23 1406)  SpO2: 96 % (01/19/23 1406)   Vital Signs (24h Range):  Temp:  [98.4 °F (36.9 °C)-100.8 °F (38.2 °C)] 98.4 °F (36.9 °C)  Pulse:  [] 108  Resp:  [20-25] 20  SpO2:  [93 %-98 %] 96 %  BP: (130-173)/() 142/76        There is no height or weight on file to calculate BMI.    Physical Exam  Vitals and nursing note reviewed.   Constitutional:       General: She is not in acute distress.     Appearance: She is well-developed. She is obese.   HENT:      Head: Normocephalic and atraumatic.      Mouth/Throat:      Pharynx: No oropharyngeal exudate.   Eyes:      Conjunctiva/sclera: Conjunctivae normal.      Pupils: Pupils are equal, round, and reactive to light.   Cardiovascular:      Rate and Rhythm: Regular rhythm. Tachycardia present.      Heart sounds: Normal heart sounds.   Pulmonary:      Effort: Pulmonary effort is normal. No respiratory distress.      Breath sounds: Wheezing present.      Comments: Audible wheezes, auscultation difficult due to body  habitus  Wearing nasal cannula  Abdominal:      General: Bowel sounds are normal. There is no distension.      Palpations: Abdomen is soft.      Tenderness: There is no abdominal tenderness.   Musculoskeletal:         General: Tenderness present. Normal range of motion.      Cervical back: Normal range of motion and neck supple.      Right lower leg: Edema present.      Left lower leg: Edema present.   Lymphadenopathy:      Cervical: No cervical adenopathy.   Skin:     General: Skin is warm and dry.      Capillary Refill: Capillary refill takes less than 2 seconds.      Findings: No rash.   Neurological:      General: No focal deficit present.      Mental Status: She is alert and oriented to person, place, and time.      Cranial Nerves: No cranial nerve deficit.      Sensory: No sensory deficit.      Coordination: Coordination normal.   Psychiatric:         Behavior: Behavior normal.         Thought Content: Thought content normal.         Judgment: Judgment normal.         CRANIAL NERVES     CN III, IV, VI   Pupils are equal, round, and reactive to light.     Significant Labs: All pertinent labs within the past 24 hours have been reviewed.  CBC:   Recent Labs   Lab 01/19/23  1202   WBC 5.03   HGB 12.1   HCT 40.4        CMP:   Recent Labs   Lab 01/19/23  1322      K 4.1      CO2 21*   *   BUN 12   CREATININE 1.3   CALCIUM 8.7   PROT 6.9   ALBUMIN 3.2*   BILITOT 0.4   ALKPHOS 74   AST 23   ALT 30   ANIONGAP 6*     Cardiac Markers:   Recent Labs   Lab 01/19/23  1202   BNP <10     Lactic Acid:   Recent Labs   Lab 01/19/23  1202   LACTATE 1.0     Troponin:   Recent Labs   Lab 01/19/23  1202   TROPONINI <0.006       Significant Imaging: I have reviewed all pertinent imaging results/findings within the past 24 hours.  X-Ray Chest 1 View  Narrative: EXAMINATION:  XR CHEST 1 VIEW    CLINICAL HISTORY:  sob; Shortness of breath    TECHNIQUE:  Single frontal view of the chest was  performed.    COMPARISON:  10/06/2022    FINDINGS:  The cardiomediastinal silhouette is not enlarged, magnified by technique..  There is no pleural effusion.  The trachea is midline.  The lungs are symmetrically expanded bilaterally with prominent central hilar interstitial attenuation.  No large focal consolidation seen.  There is no pneumothorax.  The osseous structures are remarkable for degenerative change..  Impression: 1. Central hilar findings are concerning for edema or other nonspecific pneumonitis.  No large focal consolidation.  Correlation is advised in this hypoventilatory exam.    Electronically signed by: Froylan Jack MD  Date:    01/19/2023  Time:    13:47

## 2023-01-19 NOTE — ED PROVIDER NOTES
Encounter Date: 1/19/2023       History     Chief Complaint   Patient presents with    Shortness of Breath     Audible wheezing and stridor heard, hx of asthma +cough      Pt is a 52 year old female with PMHx significant for asthma, HTN, and DM who presents for evaluation of shortness of breath, which began 1 day ago and worsened significantly today. She notes an associated cough. Reports symptoms are consistent with previous asthma exacerbations. She has required increased use of her albuterol inhaler. Believes symptoms may have been triggered by change in the weather. Denies fever, chills, nausea, vomiting, diarrhea, chest pain, or dysuria.     The history is provided by the patient and medical records.   Review of patient's allergies indicates:   Allergen Reactions    Gabapentin Other (See Comments)     Makes her twitch     Past Medical History:   Diagnosis Date    Allergy     Anemia     Anxiety     Asthma     Back pain     Chronic bronchitis     Chronic obstructive pulmonary disease, unspecified COPD type 4/1/2022    Cigarette smoker     DDD (degenerative disc disease), lumbar 6/9/2020    Depression     Diabetes mellitus with peripheral circulatory disorder 4/1/2022    Diabetes with neurologic complications     DUB (dysfunctional uterine bleeding) 10/16/2018    GERD (gastroesophageal reflux disease)     High cholesterol     Hyperprolactinemia     Hypertension     Influenza A 01/16/2020    Neuromuscular disorder     Obese body habitus     Obesity     Pseudotumor cerebri     Renal manifestation of secondary diabetes mellitus     Respiratory failure     Seizures     Simple endometrial hyperplasia 2018    Sleep apnea     Smoker     Tobacco dependence     Urinary incontinence      Past Surgical History:   Procedure Laterality Date    ANTROSTOMY OF MAXILLARY SINUS AT INFERIOR NASAL MEATUS  10/22/2021    Procedure: MAXILLARY ANTROSTOMY, AT INFERIOR NASAL MEATUS;  Surgeon: John Prado III, MD;  Location: [facility] OR;   Service: ENT;;    BREAST CYST ASPIRATION       SECTION      X 1    CHOLECYSTECTOMY      COLONOSCOPY      COLONOSCOPY N/A 2019    Procedure: COLONOSCOPY;  Surgeon: Jagruti Sweeney MD;  Location: [facility] ([address] [place] [address]);  Service: Endoscopy;  Laterality: N/A;  BMI is 63/gastroparesis/3 days full liquid diet 1 day clear liquid see telephone encounter by Ciara Pinto [facility]    EPIDURAL STEROID INJECTION N/A 2020    Procedure: INJECTION, STEROID, EPIDURAL, L5-S1 IL;  Surgeon: Lawrence Marin MD;  Location: [facility] PAIN MGT;  Service: Pain Management;  Laterality: N/A;    ESOPHAGEAL MANOMETRY WITH MEASUREMENT OF IMPEDANCE N/A 2019    Procedure: MANOMETRY-ESOPHAGEAL-WITH IMPEDANCE;  Surgeon: Jagruti Sweeney MD;  Location: [facility] ([address] [place] [address]);  Service: Endoscopy;  Laterality: N/A;  Hold Narcotics x 1 days   Hold TCA x 1 days  Propofol only. No fentanyl or benzodiazepine during sedation. If additional sedation needed, discuss with Dr. Sweeney.  10/29 - pt confirmed appt    ESOPHAGOGASTRODUODENOSCOPY N/A 2019    Procedure: EGD (ESOPHAGOGASTRODUODENOSCOPY);  Surgeon: Jagruti Sweeney MD;  Location: [facility] ([address] [place] [address]);  Service: Endoscopy;  Laterality: N/A;  BMI is 63/gastroparesis/3 days full liquid diet 1 day clear liquid see telephone encounter by Ciara Pinto [facility]    ESOPHAGOGASTRODUODENOSCOPY N/A 2019    Procedure: ESOPHAGOGASTRODUODENOSCOPY (EGD);  Surgeon: Jagruti Sweeney MD;  Location: [facility] ([address] [place] [address]);  Service: Endoscopy;  Laterality: N/A;  EGD with EndoFlip /+/- Botox  2nd floor for gastroparesis/BMI 63 **367lbs**  Bariatric Stretcher needed  Manometry probe to be placed endoscopically during EGD procedure  Due to change in protocol, Full liquid diet for 3 days/ 1 day of clear liquids  Hi    ESOPHAGOGASTRODUODENOSCOPY N/A 2020    Procedure: ESOPHAGOGASTRODUODENOSCOPY (EGD) with BRAVO;  Surgeon: Jagruti Sweeney MD;  Location: [facility] ENDO (2ND FLR);  Service: Endoscopy;   Laterality: N/A;  with Dilation  2nd floor due to BMI 56.20 (327 lbs) and gastroparesis  3 days full liquid diet and 1 day clears    ESOPHAGOGASTRODUODENOSCOPY N/A 04/15/2021    Procedure: ESOPHAGOGASTRODUODENOSCOPY (EGD);  Surgeon: Jagruti Sweeney MD;  Location: [facility] (2ND FLR);  Service: Endoscopy;  Laterality: N/A;  Endoflip  2nd floor-gastroparesis, BMI 57.18, bariatric stretcher  full liquid diet x3 days, clear liquid diet x1 day prior to procedure  propofol only  covid test 4/12 primary care, instructions emailed-[facility]    FOOT FRACTURE SURGERY Left     FUNCTIONAL ENDOSCOPIC SINUS SURGERY (FESS) USING COMPUTER-ASSISTED NAVIGATION Bilateral 10/22/2021    Procedure: FESS, USING COMPUTER-ASSISTED NAVIGATION;  Surgeon: John Prado III, MD;  Location: [facility];  Service: ENT;  Laterality: Bilateral;  FOLLOW DR PRADO ANESTHESIA PROTOCAL / pt will stay 23 hour post surgery for [facility] observation    HYSTEROSCOPY WITH DILATION AND CURETTAGE OF UTERUS N/A 10/16/2018    KJB    INJECTION OF ANESTHETIC AGENT AROUND NERVE Bilateral 10/23/2020    Procedure: BLOCK, NERVE  bilateral L3,4,5 MBB;  Surgeon: Lawrence Marin MD;  Location: [facility]GT;  Service: Pain Management;  Laterality: Bilateral;  bilateral L3,4,5 MBB   consent needed    INJECTION OF ANESTHETIC AGENT AROUND NERVE Bilateral 02/18/2022    Procedure: BLOCK, NERVE, L3-L4-L5 MEDIAL BRANCH;  Surgeon: Lawrence Marin MD;  Location: [facility] PAIN MGT;  Service: Pain Management;  Laterality: Bilateral;    PLANTAR FASCIOTOMY Left 11/18/2019    Procedure: FASCIOTOMY, PLANTAR;  Surgeon: Valentino Orourke DPM;  Location: [facility] 2ND FLR;  Service: Podiatry;  Laterality: Left;    RETINAL LASER PROCEDURE Left     SINUS SURGERY      SPHENOIDECTOMY Bilateral 10/22/2021    Procedure: SPHENOIDECTOMY;  Surgeon: John Prado III, MD;  Location: [facility];  Service: ENT;  Laterality: Bilateral;    TRANSFORAMINAL EPIDURAL INJECTION OF STEROID Bilateral 06/24/2020    Procedure:  INJECTION, STEROID, EPIDURAL, TRANSFORAMINAL APPROACH;  Surgeon: Lawrence Marin MD;  Location: [facility] PAIN [facility]GT;  Service: Pain Management;  Laterality: Bilateral;    TRANSFORAMINAL EPIDURAL INJECTION OF STEROID Bilateral 01/28/2022    Procedure: INJECTION, STEROID, EPIDURAL, TRANSFORAMINAL APPROACH  Bilateral TFESI L4/L5      NEEDS CONSENT;  Surgeon: Lawrence Marin MD;  Location: [facility] PAIN MGT;  Service: Pain Management;  Laterality: Bilateral;    TRANSFORAMINAL EPIDURAL INJECTION OF STEROID Bilateral 12/23/2022    Procedure: INJECTION, STEROID, EPIDURAL, TRANSFORAMINAL APPROACH BILATERAL L4/L5 CONTRAST  NEEDS CONSENT;  Surgeon: Lawrence Marin MD;  Location: [facility] PAIN [facility]GT;  Service: Pain Management;  Laterality: Bilateral;    UPPER GASTROINTESTINAL ENDOSCOPY       Family History   Problem Relation Age of Onset    Hypertension Mother     Diabetes Mother     Colon cancer Mother 50    Colon polyps Mother     Cataracts Mother     Heart disease Father     COPD Father     Heart attack Father     Hypertension Father     Ulcers Father     Cataracts Father     Glaucoma Father     No Known Problems Brother     Diabetes Daughter         prediabetes    Diabetes Daughter         prediabetic    Hypertension Daughter     Obesity Daughter     No Known Problems Daughter     No Known Problems Son     No Known Problems Maternal Aunt     No Known Problems Maternal Uncle     No Known Problems Paternal Aunt     No Known Problems Paternal Uncle     Cancer Maternal Grandmother     Breast cancer Maternal Grandmother     Colon cancer Maternal Grandmother     Colon polyps Maternal Grandmother     No Known Problems Maternal Grandfather     No Known Problems Paternal Grandmother     No Known Problems Paternal Grandfather     Celiac disease Neg Hx     Cirrhosis Neg Hx     Crohn's disease Neg Hx     Cystic fibrosis Neg Hx     Esophageal cancer Neg Hx     Hemochromatosis Neg Hx     Inflammatory bowel disease Neg Hx     Irritable bowel syndrome  "Neg Hx     Liver cancer Neg Hx     Liver disease Neg Hx     Rectal cancer Neg Hx     Stomach cancer Neg Hx     Ulcerative colitis Neg Hx     Jonnie's disease Neg Hx     Tuberculosis Neg Hx     Lymphoma Neg Hx     Scleroderma Neg Hx     Rheum arthritis Neg Hx     Melanoma Neg Hx     Multiple sclerosis Neg Hx     Psoriasis Neg Hx     Lupus Neg Hx     Skin cancer Neg Hx     Amblyopia Neg Hx     Blindness Neg Hx     Macular degeneration Neg Hx     Retinal detachment Neg Hx     Strabismus Neg Hx     Stroke Neg Hx     Thyroid disease Neg Hx     Allergic rhinitis Neg Hx     Allergies Neg Hx     Angioedema Neg Hx     Asthma Neg Hx     Eczema Neg Hx     Immunodeficiency Neg Hx     Rhinitis Neg Hx     Urticaria Neg Hx     Atopy Neg Hx      Social History     Tobacco Use    Smoking status: Every Day     Packs/day: 0.25     Years: 27.00     Pack years: 6.75     Types: Cigarettes     Start date: 1/1/1992     Passive exposure: Current    Smokeless tobacco: Never    Tobacco comments:     "1/2 pack per every 2 days or so"           10/21/22 patient says its less than half a pack now. Has been cutting back.   Substance Use Topics    Alcohol use: Yes     Alcohol/week: 1.0 standard drink     Types: 1 Shots of liquor per week     Comment: occasionally    Drug use: No     Review of Systems   Constitutional:  Negative for chills and fever.   HENT:  Negative for ear discharge and ear pain.    Eyes:  Negative for photophobia and visual disturbance.   Respiratory:  Positive for cough, shortness of breath and wheezing.    Cardiovascular:  Negative for chest pain and palpitations.   Gastrointestinal:  Negative for abdominal pain, diarrhea, nausea and vomiting.   Genitourinary:  Negative for dysuria and frequency.   Musculoskeletal:  Negative for back pain and neck pain.   Skin:  Negative for rash and wound.   Neurological:  Negative for syncope and headaches.     Physical Exam     Initial Vitals [01/19/23 1108]   BP Pulse Resp Temp SpO2   (!) " 172/100 105 (!) 24 (!) 100.8 °F (38.2 °C) 95 %      MAP       --         Physical Exam    Nursing note and vitals reviewed.  Constitutional: She appears well-developed and well-nourished. She appears distressed.   HENT:   Head: Normocephalic and atraumatic.   Mouth/Throat: Oropharynx is clear and moist.   Eyes: EOM are normal. No scleral icterus.   Neck: Neck supple. No tracheal deviation present.   Cardiovascular:  Regular rhythm and intact distal pulses.   Tachycardia present.         No murmur heard.  Pulmonary/Chest: No stridor. She is in respiratory distress. She has wheezes. She has no rhonchi. She has no rales.   Tachypnea    Abdominal: Abdomen is soft. She exhibits no distension. There is no abdominal tenderness.   Musculoskeletal:         General: No edema. Normal range of motion.      Cervical back: Neck supple.     Neurological: She is alert and oriented to person, place, and time. She has normal strength. No sensory deficit.   Skin: Skin is warm and dry. Capillary refill takes less than 2 seconds.       ED Course   Procedures  Labs Reviewed   CBC W/ AUTO DIFFERENTIAL - Abnormal; Notable for the following components:       Result Value    RBC 3.96 (*)      (*)     MCHC 30.0 (*)     Lymph # 0.6 (*)     Lymph % 12.5 (*)     Eosinophil % 8.5 (*)     All other components within normal limits   COMPREHENSIVE METABOLIC PANEL - Abnormal; Notable for the following components:    CO2 21 (*)     Glucose 146 (*)     Albumin 3.2 (*)     Anion Gap 6 (*)     eGFR 50.1 (*)     All other components within normal limits   CULTURE, BLOOD   CULTURE, BLOOD   B-TYPE NATRIURETIC PEPTIDE   TROPONIN I   SARS-COV2 (COVID) WITH FLU/RSV BY PCR   LACTIC ACID, PLASMA   URINALYSIS, REFLEX TO URINE CULTURE   POCT GLUCOSE MONITORING CONTINUOUS     EKG Readings: (Independently Interpreted)   Initial Reading: No STEMI. Rhythm: Normal Sinus Rhythm. Heart Rate: 93.   ECG Results              EKG 12-lead (Final result)  Result time  01/19/23 13:02:24      Final result by Interface, Lab In [facility] (01/19/23 13:02:24)                   Narrative:    Test Reason : R06.02,    Vent. Rate : 093 BPM     Atrial Rate : 093 BPM     P-R Int : 166 ms          QRS Dur : 080 ms      QT Int : 356 ms       P-R-T Axes : 042 070 063 degrees     QTc Int : 442 ms    Normal sinus rhythm  Low voltage QRS  Borderline Abnormal ECG  When compared with ECG of 06-OCT-2022 18:32,  No significant change was found  Confirmed by Christofer APONTE MD ([id number]) on 1/19/2023 1:02:18 PM    Referred By: [email or web address]   SELF           Confirmed By:Christofer APONTE MD                                  Imaging Results              X-Ray Chest 1 View (Final result)  Result time 01/19/23 13:47:37   Procedure changed from X-Ray Chest PA And Lateral     Final result by Froylan Jack MD (01/19/23 13:47:37)                   Impression:      1. Central hilar findings are concerning for edema or other nonspecific pneumonitis.  No large focal consolidation.  Correlation is advised in this hypoventilatory exam.      Electronically signed by: Froylan Jack MD  Date:    01/19/2023  Time:    13:47               Narrative:    EXAMINATION:  XR CHEST 1 VIEW    CLINICAL HISTORY:  sob; Shortness of breath    TECHNIQUE:  Single frontal view of the chest was performed.    COMPARISON:  10/06/2022    FINDINGS:  The cardiomediastinal silhouette is not enlarged, magnified by technique..  There is no pleural effusion.  The trachea is midline.  The lungs are symmetrically expanded bilaterally with prominent central hilar interstitial attenuation.  No large focal consolidation seen.  There is no pneumothorax.  The osseous structures are remarkable for degenerative change..                                       Medications   amitriptyline tablet 25 mg (has no administration in time range)   azelastine 137 mcg (0.1 %) nasal spray 274 mcg (has no administration in time range)   cetirizine tablet 10 mg (has no administration  in time range)   cyclobenzaprine tablet 10 mg (has no administration in time range)   diclofenac sodium 1 % gel 2 g (has no administration in time range)   fluticasone furoate-vilanteroL 200-25 mcg/dose diskus inhaler 1 puff (has no administration in time range)   fluticasone propionate 50 mcg/actuation nasal spray 100 mcg (has no administration in time range)   cetirizine tablet 5 mg (has no administration in time range)   LIDOcaine 5 % patch 1 patch (has no administration in time range)   losartan-hydrochlorothiazide 100-25 mg per tablet 1 tablet (has no administration in time range)   montelukast tablet 10 mg (has no administration in time range)   nicotine 21 mg/24 hr 1 patch (has no administration in time range)   ondansetron tablet 8 mg (has no administration in time range)   oxyCODONE-acetaminophen 7.5-325 mg per tablet 1 tablet (has no administration in time range)   pantoprazole EC tablet 40 mg (has no administration in time range)   pregabalin capsule 50 mg (has no administration in time range)   QUEtiapine tablet 25 mg (has no administration in time range)   ibuprofen tablet 600 mg (has no administration in time range)   topiramate tablet 100 mg (has no administration in time range)   venlafaxine 24 hr capsule 75 mg (has no administration in time range)   sodium chloride 0.9% flush 5 mL (has no administration in time range)   albuterol-ipratropium 2.5 mg-0.5 mg/3 mL nebulizer solution 3 mL (has no administration in time range)   melatonin tablet 6 mg (has no administration in time range)   ondansetron disintegrating tablet 8 mg (has no administration in time range)   prochlorperazine injection Soln 5 mg (has no administration in time range)   polyethylene glycol packet 17 g (has no administration in time range)   bisacodyL suppository 10 mg (has no administration in time range)   simethicone chewable tablet 80 mg (has no administration in time range)   aluminum-magnesium hydroxide-simethicone 200-200-20  mg/5 mL suspension 30 mL (has no administration in time range)   acetaminophen tablet 650 mg (has no administration in time range)   acetaminophen tablet 1,000 mg (has no administration in time range)   naloxone 0.4 mg/mL injection 0.02 mg (has no administration in time range)   glucose chewable tablet 16 g (has no administration in time range)   glucose chewable tablet 24 g (has no administration in time range)   glucagon (human recombinant) injection 1 mg (has no administration in time range)   dextrose 10% bolus 125 mL 125 mL (has no administration in time range)   dextrose 10% bolus 250 mL 250 mL (has no administration in time range)   enoxaparin injection 40 mg (has no administration in time range)   insulin aspart U-100 pen 1-10 Units (has no administration in time range)   predniSONE tablet 60 mg (has no administration in time range)   cefTRIAXone (ROCEPHIN) 1 g in dextrose 5 % in water (D5W) 5 % 50 mL IVPB (MB+) (has no administration in time range)   azithromycin (ZITHROMAX) 500 mg in dextrose 5 % 250 mL IVPB (Vial-Mate) (has no administration in time range)   albuterol-ipratropium 2.5 mg-0.5 mg/3 mL nebulizer solution 3 mL (has no administration in time range)   albuterol-ipratropium 2.5 mg-0.5 mg/3 mL nebulizer solution 3 mL (3 mLs Nebulization Given 1/19/23 1155)   acetaminophen tablet 1,000 mg (1,000 mg Oral Given 1/19/23 1213)   methylPREDNISolone sodium succinate injection 125 mg (125 mg Intravenous Given 1/19/23 1151)   magnesium sulfate 2g in water 50mL IVPB (premix) (0 g Intravenous Stopped 1/19/23 1452)   albuterol sulfate nebulizer solution 15 mg (15 mg Nebulization Given 1/19/23 1228)   cefTRIAXone (ROCEPHIN) 1 g in dextrose 5 % in water (D5W) 5 % 50 mL IVPB (MB+) (0 g Intravenous Stopped 1/19/23 1638)   azithromycin (ZITHROMAX) 500 mg in dextrose 5 % 250 mL IVPB (Vial-Mate) (0 mg Intravenous Stopped 1/19/23 1638)     Medical Decision Making:   History:   Old Medical Records: I decided to obtain  old medical records.  Initial Assessment:   Pt with hx of asthma presents with shortness of breath and cough. Pt initially febrile, tachycardic, and tachypneic upon ED presentation   Differential Diagnosis:   Asthma exacerbation, COPD exacerbation, covid, flu, pneumonia, sepsis, electrolyte abnormality, anemia, ACS, arrhythmia, CHF exacerbation   Independently Interpreted Test(s):   I have ordered and independently interpreted EKG Reading(s) - see prior notes  Clinical Tests:   Lab Tests: Ordered and Reviewed  Radiological Study: Ordered and Reviewed  Medical Tests: Ordered and Reviewed  ED Management:  Concern for asthma exacerbation upon initial ED presentation. Pt treated with tylenol, duonebs, steroids, and supplemental oxygen initially with noted improvement in work of breathing. On reassessment pt with continued inspiratory wheezing. Continuous albuterol initiated and again pt noted to improve. Attempt to wean pt off of supplemental O2 unsuccessful as pt desatted from 98% to 91%. CXR concerning for non specific pneumonitis. Given CXR findings and initial fever, pt covered with antibiotics for pneumonia. Pt admitted to hospital medicine for further management and evaluation                         Clinical Impression:   Final diagnoses:  [R06.02] Shortness of breath  [J45.901] Asthma exacerbation  [J18.9] Pneumonia due to infectious organism, unspecified laterality, unspecified part of lung (Primary)        ED Disposition Condition    Observation Stable                Jr. Charlie Briggs MD  Resident  01/19/23 1636       Jr. Charlie Briggs MD  Resident  01/19/23 8552

## 2023-01-20 LAB
ANION GAP SERPL CALC-SCNC: 10 MMOL/L (ref 8–16)
ASCENDING AORTA: 2.56 CM
AV INDEX (PROSTH): 0.75
AV MEAN GRADIENT: 9 MMHG
AV PEAK GRADIENT: 18 MMHG
AV VALVE AREA: 2.26 CM2
AV VELOCITY RATIO: 0.64
BASOPHILS # BLD AUTO: 0.01 K/UL (ref 0–0.2)
BASOPHILS NFR BLD: 0.1 % (ref 0–1.9)
BSA FOR ECHO PROCEDURE: 2.7 M2
BUN SERPL-MCNC: 16 MG/DL (ref 6–20)
CALCIUM SERPL-MCNC: 9 MG/DL (ref 8.7–10.5)
CHLORIDE SERPL-SCNC: 109 MMOL/L (ref 95–110)
CO2 SERPL-SCNC: 18 MMOL/L (ref 23–29)
CREAT SERPL-MCNC: 1.4 MG/DL (ref 0.5–1.4)
CV ECHO LV RWT: 0.41 CM
DIFFERENTIAL METHOD: ABNORMAL
DOP CALC AO PEAK VEL: 2.11 M/S
DOP CALC AO VTI: 37.26 CM
DOP CALC LVOT AREA: 3 CM2
DOP CALC LVOT DIAMETER: 1.96 CM
DOP CALC LVOT PEAK VEL: 1.34 M/S
DOP CALC LVOT STROKE VOLUME: 84.08 CM3
DOP CALCLVOT PEAK VEL VTI: 27.88 CM
E WAVE DECELERATION TIME: 221.45 MSEC
E/A RATIO: 0.89
E/E' RATIO: 7.29 M/S
ECHO LV POSTERIOR WALL: 0.9 CM (ref 0.6–1.1)
EJECTION FRACTION: 65 %
EOSINOPHIL # BLD AUTO: 0 K/UL (ref 0–0.5)
EOSINOPHIL NFR BLD: 0 % (ref 0–8)
ERYTHROCYTE [DISTWIDTH] IN BLOOD BY AUTOMATED COUNT: 13.6 % (ref 11.5–14.5)
EST. GFR  (NO RACE VARIABLE): 45.8 ML/MIN/1.73 M^2
FRACTIONAL SHORTENING: 32 % (ref 28–44)
GLUCOSE SERPL-MCNC: 357 MG/DL (ref 70–110)
HCT VFR BLD AUTO: 38.5 % (ref 37–48.5)
HGB BLD-MCNC: 11.9 G/DL (ref 12–16)
IMM GRANULOCYTES # BLD AUTO: 0.11 K/UL (ref 0–0.04)
IMM GRANULOCYTES NFR BLD AUTO: 1.6 % (ref 0–0.5)
INTERVENTRICULAR SEPTUM: 0.75 CM (ref 0.6–1.1)
IVRT: 99.9 MSEC
LA MAJOR: 6.16 CM
LA MINOR: 6.22 CM
LA WIDTH: 3.92 CM
LEFT ATRIUM SIZE: 4.14 CM
LEFT ATRIUM VOLUME INDEX MOD: 22.3 ML/M2
LEFT ATRIUM VOLUME INDEX: 34.2 ML/M2
LEFT ATRIUM VOLUME MOD: 55.64 CM3
LEFT ATRIUM VOLUME: 85.39 CM3
LEFT INTERNAL DIMENSION IN SYSTOLE: 3.01 CM (ref 2.1–4)
LEFT VENTRICLE DIASTOLIC VOLUME INDEX: 35.1 ML/M2
LEFT VENTRICLE DIASTOLIC VOLUME: 87.74 ML
LEFT VENTRICLE MASS INDEX: 46 G/M2
LEFT VENTRICLE SYSTOLIC VOLUME INDEX: 14.1 ML/M2
LEFT VENTRICLE SYSTOLIC VOLUME: 35.35 ML
LEFT VENTRICULAR INTERNAL DIMENSION IN DIASTOLE: 4.4 CM (ref 3.5–6)
LEFT VENTRICULAR MASS: 113.97 G
LV LATERAL E/E' RATIO: 6.2 M/S
LV SEPTAL E/E' RATIO: 8.86 M/S
LYMPHOCYTES # BLD AUTO: 0.4 K/UL (ref 1–4.8)
LYMPHOCYTES NFR BLD: 6.4 % (ref 18–48)
MCH RBC QN AUTO: 30.8 PG (ref 27–31)
MCHC RBC AUTO-ENTMCNC: 30.9 G/DL (ref 32–36)
MCV RBC AUTO: 100 FL (ref 82–98)
MONOCYTES # BLD AUTO: 0.2 K/UL (ref 0.3–1)
MONOCYTES NFR BLD: 2.8 % (ref 4–15)
MV A" WAVE DURATION": 8.28 MSEC
MV PEAK A VEL: 0.7 M/S
MV PEAK E VEL: 0.62 M/S
MV STENOSIS PRESSURE HALF TIME: 64.22 MS
MV VALVE AREA P 1/2 METHOD: 3.43 CM2
NEUTROPHILS # BLD AUTO: 6 K/UL (ref 1.8–7.7)
NEUTROPHILS NFR BLD: 89.1 % (ref 38–73)
NRBC BLD-RTO: 0 /100 WBC
PISA TR MAX VEL: 2.86 M/S
PLATELET # BLD AUTO: 272 K/UL (ref 150–450)
PMV BLD AUTO: 9.2 FL (ref 9.2–12.9)
POCT GLUCOSE: 337 MG/DL (ref 70–110)
POCT GLUCOSE: 372 MG/DL (ref 70–110)
POCT GLUCOSE: 409 MG/DL (ref 70–110)
POCT GLUCOSE: 481 MG/DL (ref 70–110)
POTASSIUM SERPL-SCNC: 4.3 MMOL/L (ref 3.5–5.1)
PROCALCITONIN SERPL IA-MCNC: 0.07 NG/ML
PULM VEIN S/D RATIO: 1.61
PV PEAK D VEL: 0.31 M/S
PV PEAK S VEL: 0.5 M/S
RA MAJOR: 5.72 CM
RA PRESSURE: 3 MMHG
RA WIDTH: 3.54 CM
RBC # BLD AUTO: 3.86 M/UL (ref 4–5.4)
RIGHT VENTRICULAR END-DIASTOLIC DIMENSION: 3.27 CM
RV TISSUE DOPPLER FREE WALL SYSTOLIC VELOCITY 1 (APICAL 4 CHAMBER VIEW): 19.72 CM/S
SINUS: 2.84 CM
SODIUM SERPL-SCNC: 137 MMOL/L (ref 136–145)
STJ: 2.58 CM
TDI LATERAL: 0.1 M/S
TDI SEPTAL: 0.07 M/S
TDI: 0.09 M/S
TR MAX PG: 33 MMHG
TRICUSPID ANNULAR PLANE SYSTOLIC EXCURSION: 2.72 CM
TV REST PULMONARY ARTERY PRESSURE: 36 MMHG
WBC # BLD AUTO: 6.76 K/UL (ref 3.9–12.7)

## 2023-01-20 PROCEDURE — 84145 PROCALCITONIN (PCT): CPT | Mod: HCNC | Performed by: HOSPITALIST

## 2023-01-20 PROCEDURE — 94640 AIRWAY INHALATION TREATMENT: CPT | Mod: HCNC

## 2023-01-20 PROCEDURE — 87040 BLOOD CULTURE FOR BACTERIA: CPT | Mod: HCNC | Performed by: HOSPITALIST

## 2023-01-20 PROCEDURE — 63600175 PHARM REV CODE 636 W HCPCS: Mod: HCNC

## 2023-01-20 PROCEDURE — S4991 NICOTINE PATCH NONLEGEND: HCPCS | Mod: HCNC

## 2023-01-20 PROCEDURE — 25000242 PHARM REV CODE 250 ALT 637 W/ HCPCS: Mod: HCNC

## 2023-01-20 PROCEDURE — 85025 COMPLETE CBC W/AUTO DIFF WBC: CPT | Mod: HCNC

## 2023-01-20 PROCEDURE — 94761 N-INVAS EAR/PLS OXIMETRY MLT: CPT | Mod: HCNC

## 2023-01-20 PROCEDURE — 94640 AIRWAY INHALATION TREATMENT: CPT | Mod: HCNC,XB

## 2023-01-20 PROCEDURE — 96372 THER/PROPH/DIAG INJ SC/IM: CPT

## 2023-01-20 PROCEDURE — 25000003 PHARM REV CODE 250: Mod: HCNC

## 2023-01-20 PROCEDURE — 96366 THER/PROPH/DIAG IV INF ADDON: CPT

## 2023-01-20 PROCEDURE — 36415 COLL VENOUS BLD VENIPUNCTURE: CPT | Mod: HCNC | Performed by: HOSPITALIST

## 2023-01-20 PROCEDURE — 99233 PR SUBSEQUENT HOSPITAL CARE,LEVL III: ICD-10-PCS | Mod: HCNC,,,

## 2023-01-20 PROCEDURE — 80048 BASIC METABOLIC PNL TOTAL CA: CPT | Mod: HCNC

## 2023-01-20 PROCEDURE — G0378 HOSPITAL OBSERVATION PER HR: HCPCS | Mod: HCNC

## 2023-01-20 PROCEDURE — 25000242 PHARM REV CODE 250 ALT 637 W/ HCPCS: Mod: HCNC | Performed by: HOSPITALIST

## 2023-01-20 PROCEDURE — 99900035 HC TECH TIME PER 15 MIN (STAT): Mod: HCNC

## 2023-01-20 PROCEDURE — 36415 COLL VENOUS BLD VENIPUNCTURE: CPT | Mod: HCNC

## 2023-01-20 PROCEDURE — 27000221 HC OXYGEN, UP TO 24 HOURS: Mod: HCNC

## 2023-01-20 PROCEDURE — 99900031 HC PATIENT EDUCATION (STAT): Mod: HCNC

## 2023-01-20 PROCEDURE — 99233 SBSQ HOSP IP/OBS HIGH 50: CPT | Mod: HCNC,,,

## 2023-01-20 PROCEDURE — 25500020 PHARM REV CODE 255: Mod: HCNC | Performed by: HOSPITALIST

## 2023-01-20 RX ORDER — IPRATROPIUM BROMIDE AND ALBUTEROL SULFATE 2.5; .5 MG/3ML; MG/3ML
3 SOLUTION RESPIRATORY (INHALATION) EVERY 6 HOURS PRN
Status: DISCONTINUED | OUTPATIENT
Start: 2023-01-20 | End: 2023-01-21 | Stop reason: HOSPADM

## 2023-01-20 RX ORDER — INSULIN ASPART 100 [IU]/ML
3 INJECTION, SOLUTION INTRAVENOUS; SUBCUTANEOUS
Status: DISCONTINUED | OUTPATIENT
Start: 2023-01-20 | End: 2023-01-20

## 2023-01-20 RX ADMIN — INSULIN ASPART 8 UNITS: 100 INJECTION, SOLUTION INTRAVENOUS; SUBCUTANEOUS at 01:01

## 2023-01-20 RX ADMIN — QUETIAPINE FUMARATE 25 MG: 25 TABLET ORAL at 09:01

## 2023-01-20 RX ADMIN — AZELASTINE 274 MCG: 1 SPRAY, METERED NASAL at 01:01

## 2023-01-20 RX ADMIN — PREGABALIN 50 MG: 50 CAPSULE ORAL at 09:01

## 2023-01-20 RX ADMIN — MONTELUKAST 10 MG: 10 TABLET, FILM COATED ORAL at 08:01

## 2023-01-20 RX ADMIN — ONDANSETRON 8 MG: 8 TABLET, ORALLY DISINTEGRATING ORAL at 09:01

## 2023-01-20 RX ADMIN — IPRATROPIUM BROMIDE AND ALBUTEROL SULFATE 3 ML: .5; 3 SOLUTION RESPIRATORY (INHALATION) at 12:01

## 2023-01-20 RX ADMIN — AZELASTINE 274 MCG: 1 SPRAY, METERED NASAL at 08:01

## 2023-01-20 RX ADMIN — PREGABALIN 50 MG: 50 CAPSULE ORAL at 05:01

## 2023-01-20 RX ADMIN — HUMAN ALBUMIN MICROSPHERES AND PERFLUTREN 0.66 MG: 10; .22 INJECTION, SOLUTION INTRAVENOUS at 04:01

## 2023-01-20 RX ADMIN — LIDOCAINE 1 PATCH: 50 PATCH CUTANEOUS at 09:01

## 2023-01-20 RX ADMIN — PANTOPRAZOLE SODIUM 40 MG: 40 TABLET, DELAYED RELEASE ORAL at 08:01

## 2023-01-20 RX ADMIN — IPRATROPIUM BROMIDE AND ALBUTEROL SULFATE 3 ML: .5; 3 SOLUTION RESPIRATORY (INHALATION) at 03:01

## 2023-01-20 RX ADMIN — AZELASTINE 274 MCG: 1 SPRAY, METERED NASAL at 09:01

## 2023-01-20 RX ADMIN — DICLOFENAC SODIUM 2 G: 10 GEL TOPICAL at 05:01

## 2023-01-20 RX ADMIN — PREGABALIN 50 MG: 50 CAPSULE ORAL at 08:01

## 2023-01-20 RX ADMIN — CEFTRIAXONE 1 G: 1 INJECTION, POWDER, FOR SOLUTION INTRAMUSCULAR; INTRAVENOUS at 05:01

## 2023-01-20 RX ADMIN — CYCLOBENZAPRINE 10 MG: 10 TABLET, FILM COATED ORAL at 08:01

## 2023-01-20 RX ADMIN — PREDNISONE 60 MG: 50 TABLET ORAL at 09:01

## 2023-01-20 RX ADMIN — PANTOPRAZOLE SODIUM 40 MG: 40 TABLET, DELAYED RELEASE ORAL at 09:01

## 2023-01-20 RX ADMIN — ENOXAPARIN SODIUM 40 MG: 40 INJECTION SUBCUTANEOUS at 09:01

## 2023-01-20 RX ADMIN — DICLOFENAC SODIUM 2 G: 10 GEL TOPICAL at 08:01

## 2023-01-20 RX ADMIN — OXYCODONE AND ACETAMINOPHEN 1 TABLET: 7.5; 325 TABLET ORAL at 03:01

## 2023-01-20 RX ADMIN — AMITRIPTYLINE HYDROCHLORIDE 25 MG: 25 TABLET, FILM COATED ORAL at 08:01

## 2023-01-20 RX ADMIN — FLUTICASONE FUROATE AND VILANTEROL TRIFENATATE 1 PUFF: 200; 25 POWDER RESPIRATORY (INHALATION) at 11:01

## 2023-01-20 RX ADMIN — TOPIRAMATE 100 MG: 25 TABLET, FILM COATED ORAL at 08:01

## 2023-01-20 RX ADMIN — VENLAFAXINE HYDROCHLORIDE 75 MG: 75 CAPSULE, EXTENDED RELEASE ORAL at 09:01

## 2023-01-20 RX ADMIN — FLUTICASONE PROPIONATE 100 MCG: 50 SPRAY, METERED NASAL at 09:01

## 2023-01-20 RX ADMIN — INSULIN ASPART 5 UNITS: 100 INJECTION, SOLUTION INTRAVENOUS; SUBCUTANEOUS at 09:01

## 2023-01-20 RX ADMIN — FLUTICASONE PROPIONATE 100 MCG: 50 SPRAY, METERED NASAL at 08:01

## 2023-01-20 RX ADMIN — DICLOFENAC SODIUM 2 G: 10 GEL TOPICAL at 09:01

## 2023-01-20 RX ADMIN — LOSARTAN POTASSIUM AND HYDROCHLOROTHIAZIDE 1 TABLET: 100; 25 TABLET, FILM COATED ORAL at 09:01

## 2023-01-20 RX ADMIN — ENOXAPARIN SODIUM 40 MG: 40 INJECTION SUBCUTANEOUS at 08:01

## 2023-01-20 RX ADMIN — IBUPROFEN 600 MG: 400 TABLET ORAL at 05:01

## 2023-01-20 RX ADMIN — FLUTICASONE PROPIONATE 100 MCG: 50 SPRAY, METERED NASAL at 01:01

## 2023-01-20 RX ADMIN — INSULIN ASPART 10 UNITS: 100 INJECTION, SOLUTION INTRAVENOUS; SUBCUTANEOUS at 05:01

## 2023-01-20 RX ADMIN — Medication 1 PATCH: at 09:01

## 2023-01-20 RX ADMIN — IBUPROFEN 600 MG: 400 TABLET ORAL at 01:01

## 2023-01-20 RX ADMIN — CETIRIZINE HYDROCHLORIDE 5 MG: 5 TABLET, FILM COATED ORAL at 08:01

## 2023-01-20 NOTE — ED NOTES
"Pt noted to have wheezing.  Pt denies CP,+ coughing.  Pt states " I do feel better than when I came in".  Respiratory informed of need of prn treatment. Resp to bedside to assess pt. No distress noted.  02 sat 97% 2L nc.       "

## 2023-01-20 NOTE — ASSESSMENT & PLAN NOTE
Patient's FSGs are controlled on current home medication regimen, which is as follows:   - Jardiance 10 mg daily   - Semaglutide 2 mg, every 7 days  Last A1c reviewed-   Lab Results   Component Value Date    HGBA1C 5.8 (H) 12/20/2022     Most recent fingerstick glucose reviewed- Glucose on 1/20 BMP = 357   - Added insulin regimen  Current correctional scale  Medium  Maintain anti-hyperglycemic dose as follows-   Antihyperglycemics (From admission, onward)    Start     Stop Route Frequency Ordered    01/20/23 2100  insulin detemir U-100 pen 12 Units         -- SubQ Nightly 01/20/23 1314    01/20/23 1645  insulin aspart U-100 pen 3 Units         -- SubQ 3 times daily with meals 01/20/23 1314    01/19/23 1706  insulin aspart U-100 pen 1-10 Units         -- SubQ Before meals & nightly PRN 01/19/23 1606        - Hold Oral hypoglycemics while patient is in the hospital.

## 2023-01-20 NOTE — ASSESSMENT & PLAN NOTE
COPD exacerbation  Moderate persistent asthma without complication    52 y.o. female admitted to  observation with asthma exacerbation with COPD. Patient experiencing dyspnea upon exertion, wheezing, tachycardia, tachypnea and fever. Received duonebs, magnesium, steroids and continuous albuterol in the ED, noted improvement in dyspnea. Required supplemental O2 via NC to main oxygen saturation.     - CXR with Central hilar findings are concerning for edema or other nonspecific pneumonitis.  No large focal consolidation.  - Continue home inhalers: breo, albuterol  - Continue singulair 10 mg qhs  - Scheduled duonebs q4h  - Start prednisone 60 mg x 5 days, last dose on 1/24  - Start IV azithromycin and IV Ceftriaxone for CAP coverage  - Supplemental oxygen via NC for SpO2>90%    - weaned to 1L, plan to wean to room air overnight  - Consider home oxygen, if necessary  - Patient endorsed feeling fluid overloaded, denies history of CHF   - Echo ordered, pending results   - Consider lasix for diuresis, if warranted

## 2023-01-20 NOTE — ASSESSMENT & PLAN NOTE
- Patient endorses currently smoking   - Nicotine patch ordered   - Patient states hospital patches are better than her home patches. Home patches do not stay adhered to skin, hence her non-compliance   - Patient interested in smoking cessation, has resources from previous admission   - Smoking cessation with respiratory team requested

## 2023-01-20 NOTE — HOSPITAL COURSE
52 y.o. female admitted to  observation for asthma exacerbation in COPD. Patient treated with supplemental oxygen, scheduled duonebs and steroids. CXR with possible pneumonitis, started on antibiotics for CAP coverage. Procal/lactic acid normal. Respiratory infection panel negative. UA with WBC, no urinary symptoms noted. Improved respiratory symptoms noted, weaned oxygen as tolerated, patient returned to room air. Completed six minute walk test, patient passed, no hypoxia noted. Hyperglycemia noted, started on long acting insulin. Counseled patient that hyperglycemia most likely due to her steroid course and lack of jardiance while inpatient. Patient medically stable for discharge with pulm and PCP f/u.

## 2023-01-20 NOTE — ASSESSMENT & PLAN NOTE
- CBC reviewed, H/H 12.1/40.4 at time of admission  - H/H 11.9/38.5 today  - Stable, follow with daily CBC

## 2023-01-20 NOTE — PLAN OF CARE
[facility]  Initial Discharge Assessment       Primary Care Provider: Carlyle Gordillo MD    Admission Diagnosis: Shortness of breath [R06.02]  Chest pain [R07.9]  Asthma exacerbation [J45.901]  Pneumonia due to infectious organism, unspecified laterality, unspecified part of lung [J18.9]  COVID-19 [U07.1]    Admission Date: 1/19/2023  Expected Discharge Date: 1/21/2023         Payor: [employer] MEDICARE / Plan: [employer] (SPECIAL NEEDS PLAN) / Product Type: Medicare Advantage /     Extended Emergency Contact Information  Primary Emergency Contact: Mathieu Kaiser  Address: [address] [place] [address] [place] [address] [facility]  Mobile Phone: [phone]  Relation: Spouse    Discharge Plan A: (P) Home with family  Discharge Plan B: (P) Home with family      [facility] [place] - [address]  [address]  [place] [address]  Phone: [phone] Fax: [phone]    [facility]  [address] [address]  Phone: [phone] Fax: [phone]    [id number] - [facility] [facility] & [address]  [facility] [place] [address]  Phone: [phone] Fax: [phone]           SW completed Discharge Planning Assessment with patient via bedside. Discharge planning booklet given to patient/family and whiteboard updated with PHILLIP and phone #. All questions answered.    Patient reported that family will provide transportation upon discharge.     Patient reported that she lives at home with her daughter, and prior to hospitalization she was independent with her ADL's. Patient reported that she is not on dialysis and does not go to a Coumadin clinic.      Patient lives in an apartment complex with 0 steps to enter.       Dianelys Hicks LMSW  [facility]  Ext. [phone]

## 2023-01-20 NOTE — NURSING
Notified HCP of lab reports of: + BC collected on 1/19/23 anaerobic gram + cocci via secure chat. No orders given at this time

## 2023-01-20 NOTE — SUBJECTIVE & OBJECTIVE
Interval History: TRINITY NOVA, continued on 2L supplemental oxygen via NC. Evaluated patient at bedside, resting comfortably in bed. Reported that she has only been using the oxygen when she moves around and feels short of breath. Endorsed mild dyspnea with exertion and improvement in symptoms at rest. Weaned patient to 1L oxygen, will wean to room air as tolerated. Plan for walk test today to evaluate patient's oxygenation status. Patient not interested in home oxygen at this time. Expressed understanding of need to stop smoking and weight loss for improvement in her COPD/asthma symptoms. Blood cultures resulted with one positive result and one negative result, repeat cultures ordered due possible contamination. Procal and lactic acid wnl. Patient stated that she thinks her symptoms are due to being fluid overloaded, denies history of CHF. Ordered echo to evaluate for CHF, will consider diuresis pending results. Denies any other symptoms at this time.     Review of Systems   Constitutional:  Positive for fatigue. Negative for chills and fever.   Respiratory:  Positive for cough, shortness of breath and wheezing. Negative for chest tightness.    Cardiovascular:  Negative for chest pain and leg swelling.   Gastrointestinal:  Negative for abdominal pain and nausea.   Musculoskeletal:  Positive for back pain.   Neurological:  Negative for dizziness and weakness.   Objective:     Vital Signs (Most Recent):  Temp: 97.7 °F (36.5 °C) (01/20/23 1214)  Pulse: 82 (01/20/23 1214)  Resp: 18 (01/20/23 1214)  BP: 126/60 (01/20/23 1214)  SpO2: (!) 93 % (01/20/23 1214)   Vital Signs (24h Range):  Temp:  [97.7 °F (36.5 °C)-98.5 °F (36.9 °C)] 97.7 °F (36.5 °C)  Pulse:  [] 82  Resp:  [18-22] 18  SpO2:  [92 %-99 %] 93 %  BP: (119-173)/(58-91) 126/60     Weight: (!) 162 kg (357 lb 2.3 oz)  Body mass index is 61.3 kg/m².  No intake or output data in the 24 hours ending 01/20/23 1255   Physical Exam  Vitals and nursing note  reviewed.   Constitutional:       General: She is not in acute distress.     Appearance: She is well-developed. She is obese. She is not ill-appearing.   Eyes:      Pupils: Pupils are equal, round, and reactive to light.   Cardiovascular:      Rate and Rhythm: Normal rate and regular rhythm.   Pulmonary:      Effort: Pulmonary effort is normal. No respiratory distress.      Breath sounds: Wheezing (bilateral lower lobes) present.   Abdominal:      Palpations: Abdomen is soft.      Tenderness: There is no abdominal tenderness.   Musculoskeletal:         General: No tenderness.      Right lower leg: Edema (mild) present.      Left lower leg: Edema (mild) present.   Skin:     General: Skin is warm and dry.   Neurological:      Mental Status: She is alert and oriented to person, place, and time.   Psychiatric:         Behavior: Behavior normal.       Significant Labs: All pertinent labs within the past 24 hours have been reviewed.  BMP:   Recent Labs   Lab 01/20/23  0317   *      K 4.3      CO2 18*   BUN 16   CREATININE 1.4   CALCIUM 9.0     CBC:   Recent Labs   Lab 01/19/23  1202 01/20/23  0317   WBC 5.03 6.76   HGB 12.1 11.9*   HCT 40.4 38.5    272     Lactic Acid:   Recent Labs   Lab 01/19/23  1202   LACTATE 1.0     Procalcitonin 01/20/23 = 0.07    Significant Imaging: I have reviewed all pertinent imaging results/findings within the past 24 hours.

## 2023-01-20 NOTE — ASSESSMENT & PLAN NOTE
Body mass index is 61.3 kg/m². Morbid obesity complicates all aspects of disease management from diagnostic modalities to treatment. Weight loss encouraged and health benefits explained to patient.  - Patient interested in weight loss resources  - Consider ambulatory referral placed for bariatric clinic  - PCP f/u

## 2023-01-20 NOTE — PROGRESS NOTES
[facility] [address] [place] [address] Medicine  Progress Note    Patient Name: Yanni Kaiser  MRN: [id number]  Patient Class: OP- Observation   Admission Date: 1/19/2023  Length of Stay: 0 days  Attending Physician: Rosaura Farah MD  Primary Care Provider: Carlyle Gordillo MD        Subjective:     Principal Problem:Asthma exacerbation in COPD        HPI:  Yanni Kaiser is a 52 y.o. female with PMHx of asthma, HTN, HLD, T2DM, depression, SHARATH, GERD and morbid obesity, who presents to the ED with dyspnea x 1 day. Patient stated that she began feeling short of breath at home yesterday and developed a productive cough, with clear/yellow phlegm. This morning, patient noted herself to be very winded after getting ready for the day and dropping off her children. She had a PM&R appointment this morning. She was unable to ambulate from the parking lot to the building, without feeling fully winded. It was recommended that she go to the ED for further evaluation. Patient presented with fever, tachypnea and tachycardia. Received treatment and reported improvement in symptoms. Currently, patient is experiencing dyspnea with exertion, cough and lumbar back pain. Denies chest pain, abdominal pain, recent sick contacts, N/V/D.    ED: Vitals reviewed, , /91, febrile 100.8F, RR 25. CBC and CMP unremarkable. BNP  wnl, troponin negative. Lactate 1.0. Blood cultures ordered. CXR with Central hilar findings are concerning for edema or other nonspecific pneumonitis.  No large focal consolidation.  Correlation is advised in this hypoventilatory exam. EKG with NSR. Received duonebs, steroids and continuous albuterol, noted improvement in dyspnea. Required supplemental O2 via NC.       Overview/Hospital Course:  52 y.o. female admitted to  observation for asthma exacerbation in COPD. Patient treated with supplemental oxygen, scheduled duonebs and steroids. CXR with possible pneumonitis, started on antibiotics for CAP  coverage. Procal/lactic acid normal. Respiratory infection panel negative. UA with WBC, no urinary symptoms noted. Improved respiratory symptoms noted in morning, weaned oxygen as tolerated. Plan to complete six minute walk test. Will discharge patient home when medically stable.       Interval History: TRINITY NOVA, continued on 2L supplemental oxygen via NC. Evaluated patient at bedside, resting comfortably in bed. Reported that she has only been using the oxygen when she moves around and feels short of breath. Endorsed mild dyspnea with exertion and improvement in symptoms at rest. Weaned patient to 1L oxygen, will wean to room air as tolerated. Plan for walk test today to evaluate patient's oxygenation status. Patient not interested in home oxygen at this time. Expressed understanding of need to stop smoking and weight loss for improvement in her COPD/asthma symptoms. Blood cultures resulted with one positive result and one negative result, repeat cultures ordered due possible contamination. Procal and lactic acid wnl. Patient stated that she thinks her symptoms are due to being fluid overloaded, denies history of CHF. Ordered echo to evaluate for CHF, will consider diuresis pending results. Denies any other symptoms at this time.     Review of Systems   Constitutional:  Positive for fatigue. Negative for chills and fever.   Respiratory:  Positive for cough, shortness of breath and wheezing. Negative for chest tightness.    Cardiovascular:  Negative for chest pain and leg swelling.   Gastrointestinal:  Negative for abdominal pain and nausea.   Musculoskeletal:  Positive for back pain.   Neurological:  Negative for dizziness and weakness.   Objective:     Vital Signs (Most Recent):  Temp: 97.7 °F (36.5 °C) (01/20/23 1214)  Pulse: 82 (01/20/23 1214)  Resp: 18 (01/20/23 1214)  BP: 126/60 (01/20/23 1214)  SpO2: (!) 93 % (01/20/23 1214)   Vital Signs (24h Range):  Temp:  [97.7 °F (36.5 °C)-98.5 °F (36.9 °C)] 97.7 °F  (36.5 °C)  Pulse:  [] 82  Resp:  [18-22] 18  SpO2:  [92 %-99 %] 93 %  BP: (119-173)/(58-91) 126/60     Weight: (!) 162 kg (357 lb 2.3 oz)  Body mass index is 61.3 kg/m².  No intake or output data in the 24 hours ending 01/20/23 1255   Physical Exam  Vitals and nursing note reviewed.   Constitutional:       General: She is not in acute distress.     Appearance: She is well-developed. She is obese. She is not ill-appearing.   Eyes:      Pupils: Pupils are equal, round, and reactive to light.   Cardiovascular:      Rate and Rhythm: Normal rate and regular rhythm.   Pulmonary:      Effort: Pulmonary effort is normal. No respiratory distress.      Breath sounds: Wheezing (bilateral lower lobes) present.   Abdominal:      Palpations: Abdomen is soft.      Tenderness: There is no abdominal tenderness.   Musculoskeletal:         General: No tenderness.      Right lower leg: Edema (mild) present.      Left lower leg: Edema (mild) present.   Skin:     General: Skin is warm and dry.   Neurological:      Mental Status: She is alert and oriented to person, place, and time.   Psychiatric:         Behavior: Behavior normal.       Significant Labs: All pertinent labs within the past 24 hours have been reviewed.  BMP:   Recent Labs   Lab 01/20/23  0317   *      K 4.3      CO2 18*   BUN 16   CREATININE 1.4   CALCIUM 9.0     CBC:   Recent Labs   Lab 01/19/23  1202 01/20/23  0317   WBC 5.03 6.76   HGB 12.1 11.9*   HCT 40.4 38.5    272     Lactic Acid:   Recent Labs   Lab 01/19/23  1202   LACTATE 1.0     Procalcitonin 01/20/23 = 0.07    Significant Imaging: I have reviewed all pertinent imaging results/findings within the past 24 hours.      Assessment/Plan:      * Asthma exacerbation in COPD  COPD exacerbation  Moderate persistent asthma without complication    52 y.o. female admitted to  observation with asthma exacerbation with COPD. Patient experiencing dyspnea upon exertion, wheezing, tachycardia,  tachypnea and fever. Received duonebs, magnesium, steroids and continuous albuterol in the ED, noted improvement in dyspnea. Required supplemental O2 via NC to main oxygen saturation.     - CXR with Central hilar findings are concerning for edema or other nonspecific pneumonitis.  No large focal consolidation.  - Continue home inhalers: breo, albuterol  - Continue singulair 10 mg qhs  - Scheduled duonebs q4h  - Start prednisone 60 mg x 5 days, last dose on 1/24  - Start IV azithromycin and IV Ceftriaxone for CAP coverage  - Supplemental oxygen via NC for SpO2>90%    - weaned to 1L, plan to wean to room air overnight  - Consider home oxygen, if necessary  - Patient endorsed feeling fluid overloaded, denies history of CHF   - Echo ordered, pending results   - Consider lasix for diuresis, if warranted    Chronic left-sided low back pain with sciatica  - Continue lyrica 50 mg TID  - Continue lidocaine patches and Voltaren gel    Stage 3 chronic kidney disease  - Cr 1.3, eGFR 56 at time of admission  - Monitor with daily BMP  - Avoid nephrotoxins  - Renally dose all medications    Recurrent major depressive disorder, in full remission  Patient has persistent depression which is moderate and is currently controlled. Will Continue anti-depressant medications. We will not consult psychiatry at this time. Patient does not display psychosis at this time. Continue to monitor closely and adjust plan of care as needed.  - Continue amitriptyline 25 mg, qhs PRN for insomnia  - Continue Seroquel 25 mg daily  - Continue venlafaxine 75 mg daily    Type 2 diabetes mellitus with stage 3 chronic kidney disease, with long-term current use of insulin  Patient's FSGs are controlled on current home medication regimen, which is as follows:   - Jardiance 10 mg daily   - Semaglutide 2 mg, every 7 days  Last A1c reviewed-   Lab Results   Component Value Date    HGBA1C 5.8 (H) 12/20/2022     Most recent fingerstick glucose reviewed- Glucose on  1/20 BMP = 357   - Added insulin regimen  Current correctional scale  Medium  Maintain anti-hyperglycemic dose as follows-   Antihyperglycemics (From admission, onward)    Start     Stop Route Frequency Ordered    01/20/23 2100  insulin detemir U-100 pen 12 Units         -- SubQ Nightly 01/20/23 1314    01/20/23 1645  insulin aspart U-100 pen 3 Units         -- SubQ 3 times daily with meals 01/20/23 1314    01/19/23 1706  insulin aspart U-100 pen 1-10 Units         -- SubQ Before meals & nightly PRN 01/19/23 1606        - Hold Oral hypoglycemics while patient is in the hospital.    GERD (gastroesophageal reflux disease)  - Continue protonix 40 mg BID    Essential hypertension  - Continue Hyzaar 100-25 mg daily  - Controlled BP noted   - Monitor BP with q4h vitals    Anemia of chronic disease  - CBC reviewed, H/H 12.1/40.4 at time of admission  - H/H 11.9/38.5 today  - Stable, follow with daily CBC    Cigarette nicotine dependence without complication  - Patient endorses currently smoking   - Nicotine patch ordered   - Patient states hospital patches are better than her home patches. Home patches do not stay adhered to skin, hence her non-compliance   - Patient interested in smoking cessation, has resources from previous admission   - Smoking cessation with respiratory team requested    Morbid obesity  Body mass index is 61.3 kg/m². Morbid obesity complicates all aspects of disease management from diagnostic modalities to treatment. Weight loss encouraged and health benefits explained to patient.  - Patient interested in weight loss resources  - Consider ambulatory referral placed for bariatric clinic  - PCP f/u    SHARATH (obstructive sleep apnea)  - Patient endorses not using CPAP qhs, as prescribed  - Order CPAP qhs if warranted    VTE Risk Mitigation (From admission, onward)         Ordered     enoxaparin injection 40 mg  Every 12 hours         01/19/23 1546     IP VTE HIGH RISK PATIENT  Once         01/19/23 154      Place DANA hose  Until discontinued         01/19/23 1546                Discharge Planning   PHILLIP: 1/21/2023     Code Status: Full Code   Is the patient medically ready for discharge?: No    Reason for patient still in hospital (select all that apply): Patient trending condition and Treatment  Discharge Plan A: Home with family          Makenzie Roman PA-C  Department of Hospital Medicine   [facility]

## 2023-01-21 VITALS
HEART RATE: 77 BPM | HEIGHT: 64 IN | TEMPERATURE: 98 F | OXYGEN SATURATION: 97 % | WEIGHT: 293 LBS | SYSTOLIC BLOOD PRESSURE: 140 MMHG | RESPIRATION RATE: 20 BRPM | BODY MASS INDEX: 50.02 KG/M2 | DIASTOLIC BLOOD PRESSURE: 77 MMHG

## 2023-01-21 LAB
ANION GAP SERPL CALC-SCNC: 6 MMOL/L (ref 8–16)
BACTERIA UR CULT: NORMAL
BASOPHILS # BLD AUTO: 0.02 K/UL (ref 0–0.2)
BASOPHILS NFR BLD: 0.2 % (ref 0–1.9)
BUN SERPL-MCNC: 23 MG/DL (ref 6–20)
CALCIUM SERPL-MCNC: 8.9 MG/DL (ref 8.7–10.5)
CHLORIDE SERPL-SCNC: 106 MMOL/L (ref 95–110)
CO2 SERPL-SCNC: 24 MMOL/L (ref 23–29)
CREAT SERPL-MCNC: 1.2 MG/DL (ref 0.5–1.4)
DIFFERENTIAL METHOD: ABNORMAL
EOSINOPHIL # BLD AUTO: 0 K/UL (ref 0–0.5)
EOSINOPHIL NFR BLD: 0 % (ref 0–8)
ERYTHROCYTE [DISTWIDTH] IN BLOOD BY AUTOMATED COUNT: 13.6 % (ref 11.5–14.5)
EST. GFR  (NO RACE VARIABLE): 55.1 ML/MIN/1.73 M^2
GLUCOSE SERPL-MCNC: 323 MG/DL (ref 70–110)
HCT VFR BLD AUTO: 36.9 % (ref 37–48.5)
HGB BLD-MCNC: 11.7 G/DL (ref 12–16)
IMM GRANULOCYTES # BLD AUTO: 0.19 K/UL (ref 0–0.04)
IMM GRANULOCYTES NFR BLD AUTO: 2.3 % (ref 0–0.5)
LYMPHOCYTES # BLD AUTO: 1.3 K/UL (ref 1–4.8)
LYMPHOCYTES NFR BLD: 15.5 % (ref 18–48)
MCH RBC QN AUTO: 31 PG (ref 27–31)
MCHC RBC AUTO-ENTMCNC: 31.7 G/DL (ref 32–36)
MCV RBC AUTO: 98 FL (ref 82–98)
MONOCYTES # BLD AUTO: 0.8 K/UL (ref 0.3–1)
MONOCYTES NFR BLD: 10 % (ref 4–15)
NEUTROPHILS # BLD AUTO: 6 K/UL (ref 1.8–7.7)
NEUTROPHILS NFR BLD: 72 % (ref 38–73)
NRBC BLD-RTO: 0 /100 WBC
PLATELET # BLD AUTO: 275 K/UL (ref 150–450)
PMV BLD AUTO: 9.1 FL (ref 9.2–12.9)
POCT GLUCOSE: 153 MG/DL (ref 70–110)
POTASSIUM SERPL-SCNC: 4.3 MMOL/L (ref 3.5–5.1)
RBC # BLD AUTO: 3.77 M/UL (ref 4–5.4)
SODIUM SERPL-SCNC: 136 MMOL/L (ref 136–145)
WBC # BLD AUTO: 8.27 K/UL (ref 3.9–12.7)

## 2023-01-21 PROCEDURE — G0378 HOSPITAL OBSERVATION PER HR: HCPCS | Mod: HCNC

## 2023-01-21 PROCEDURE — 25000003 PHARM REV CODE 250: Mod: HCNC

## 2023-01-21 PROCEDURE — S4991 NICOTINE PATCH NONLEGEND: HCPCS | Mod: HCNC

## 2023-01-21 PROCEDURE — 25000242 PHARM REV CODE 250 ALT 637 W/ HCPCS: Mod: HCNC | Performed by: HOSPITALIST

## 2023-01-21 PROCEDURE — 85025 COMPLETE CBC W/AUTO DIFF WBC: CPT | Mod: HCNC

## 2023-01-21 PROCEDURE — 99239 PR HOSPITAL DISCHARGE DAY,>30 MIN: ICD-10-PCS | Mod: HCNC,,,

## 2023-01-21 PROCEDURE — 96372 THER/PROPH/DIAG INJ SC/IM: CPT

## 2023-01-21 PROCEDURE — 94761 N-INVAS EAR/PLS OXIMETRY MLT: CPT | Mod: HCNC

## 2023-01-21 PROCEDURE — 36415 COLL VENOUS BLD VENIPUNCTURE: CPT | Mod: HCNC

## 2023-01-21 PROCEDURE — 94640 AIRWAY INHALATION TREATMENT: CPT | Mod: HCNC

## 2023-01-21 PROCEDURE — 96372 THER/PROPH/DIAG INJ SC/IM: CPT | Performed by: NURSE PRACTITIONER

## 2023-01-21 PROCEDURE — 99239 HOSP IP/OBS DSCHRG MGMT >30: CPT | Mod: HCNC,,,

## 2023-01-21 PROCEDURE — 25000003 PHARM REV CODE 250: Mod: HCNC | Performed by: NURSE PRACTITIONER

## 2023-01-21 PROCEDURE — 63600175 PHARM REV CODE 636 W HCPCS: Mod: HCNC

## 2023-01-21 PROCEDURE — 80048 BASIC METABOLIC PNL TOTAL CA: CPT | Mod: HCNC

## 2023-01-21 RX ORDER — PREDNISONE 20 MG/1
40 TABLET ORAL DAILY
Qty: 6 TABLET | Refills: 0 | Status: SHIPPED | OUTPATIENT
Start: 2023-01-22 | End: 2023-01-25

## 2023-01-21 RX ADMIN — PANTOPRAZOLE SODIUM 40 MG: 40 TABLET, DELAYED RELEASE ORAL at 09:01

## 2023-01-21 RX ADMIN — PREDNISONE 60 MG: 50 TABLET ORAL at 09:01

## 2023-01-21 RX ADMIN — DICLOFENAC SODIUM 2 G: 10 GEL TOPICAL at 09:01

## 2023-01-21 RX ADMIN — INSULIN DETEMIR 12 UNITS: 100 INJECTION, SOLUTION SUBCUTANEOUS at 12:01

## 2023-01-21 RX ADMIN — INSULIN DETEMIR 12 UNITS: 100 INJECTION, SOLUTION SUBCUTANEOUS at 09:01

## 2023-01-21 RX ADMIN — IBUPROFEN 600 MG: 400 TABLET ORAL at 12:01

## 2023-01-21 RX ADMIN — IPRATROPIUM BROMIDE AND ALBUTEROL SULFATE 3 ML: .5; 3 SOLUTION RESPIRATORY (INHALATION) at 12:01

## 2023-01-21 RX ADMIN — ENOXAPARIN SODIUM 40 MG: 40 INJECTION SUBCUTANEOUS at 09:01

## 2023-01-21 RX ADMIN — VENLAFAXINE HYDROCHLORIDE 75 MG: 75 CAPSULE, EXTENDED RELEASE ORAL at 09:01

## 2023-01-21 RX ADMIN — QUETIAPINE FUMARATE 25 MG: 25 TABLET ORAL at 09:01

## 2023-01-21 RX ADMIN — PREGABALIN 50 MG: 50 CAPSULE ORAL at 09:01

## 2023-01-21 RX ADMIN — Medication 1 PATCH: at 09:01

## 2023-01-21 RX ADMIN — IBUPROFEN 600 MG: 400 TABLET ORAL at 06:01

## 2023-01-21 RX ADMIN — LOSARTAN POTASSIUM AND HYDROCHLOROTHIAZIDE 1 TABLET: 100; 25 TABLET, FILM COATED ORAL at 09:01

## 2023-01-21 RX ADMIN — LIDOCAINE 1 PATCH: 50 PATCH CUTANEOUS at 09:01

## 2023-01-21 RX ADMIN — AZELASTINE 274 MCG: 1 SPRAY, METERED NASAL at 09:01

## 2023-01-21 RX ADMIN — FLUTICASONE PROPIONATE 100 MCG: 50 SPRAY, METERED NASAL at 09:01

## 2023-01-21 RX ADMIN — FLUTICASONE FUROATE AND VILANTEROL TRIFENATATE 1 PUFF: 200; 25 POWDER RESPIRATORY (INHALATION) at 09:01

## 2023-01-21 NOTE — ASSESSMENT & PLAN NOTE
Body mass index is 61.28 kg/m². Morbid obesity complicates all aspects of disease management from diagnostic modalities to treatment. Weight loss encouraged and health benefits explained to patient.  - Patient interested in weight loss resources  - Previously followed with [facility], patient to f/u  - PCP f/u placed

## 2023-01-21 NOTE — NURSING
Home Oxygen Evaluation    Date Performed: 2023    1) Patient's Home O2 Sat on room air, while at rest: 94%         If O2 sats on room air at rest are 88% or below, patient qualifies. No additional testing needed. Document N/A in steps 2 and 3. If 89% or above, complete steps 2.      2) Patient's O2 Sat on room air while exercisin%        If O2 sats on room air while exercising remain 89% or above patient does not qualify, no further testing needed Document N/A in step 3. If O2 sats on room air while exercising are 88% or below, continue to step 3.      3) Patient's O2 Sat while exercising on O2: NA at NA LPM         (Must show improvement from #2 for patients to qualify)    If O2 sats improve on oxygen, patient qualifies for portable oxygen. If not, the patient does not qualify.

## 2023-01-21 NOTE — ASSESSMENT & PLAN NOTE
COPD exacerbation  Moderate persistent asthma without complication    52 y.o. female admitted to  observation with asthma exacerbation with COPD. Patient experiencing dyspnea upon exertion, wheezing, tachycardia, tachypnea and fever. Received duonebs, magnesium, steroids and continuous albuterol in the ED, noted improvement in dyspnea. Required supplemental O2 via NC to main oxygen saturation.     - CXR with Central hilar findings are concerning for edema or other nonspecific pneumonitis.  No large focal consolidation.  - Continued home inhalers: breo, albuterol  - Continued singulair 10 mg qhs  - Scheduled duonebs q4h  - Started prednisone 60 mg daily - received 2 doses inpatient  - Started IV azithromycin and IV Ceftriaxone for CAP coverage   - No leukocytosis, lactate normal, procalcitonin negative   - Discontinued antibiotics  - Supplemental oxygen via NC for SpO2>90%    - Weaned to room air   - Patient completed six minute walk test, no hypoxia noted, no home oxygen needed  - Patient endorsed feeling fluid overloaded, denies history of CHF   - Echo ordered, results below   - No diuresis warranted    Results for orders placed during the hospital encounter of 01/19/23    Echo    Interpretation Summary  · The left ventricle is normal in size with normal systolic function.  · The estimated ejection fraction is 65%.  · Indeterminate left ventricular diastolic function.  · Normal right ventricular size with normal right ventricular systolic function.  · Mild right atrial enlargement.  · Normal central venous pressure (3 mmHg).  · The estimated PA systolic pressure is 36 mmHg.

## 2023-01-21 NOTE — DISCHARGE SUMMARY
[facility] [address] [place] [address] Medicine  Discharge Summary      Patient Name: Yanni Kaiser  MRN: [id number]  BRITTON: [id number]  Patient Class: OP- Observation  Admission Date: 1/19/2023  Hospital Length of Stay: 0 days  Discharge Date and Time: 1/21/2023  1:32 PM  Attending Physician: Ness att. providers found   Discharging Provider: Makenzie Roman PA-C  Primary Care Provider: Carlyle Gordillo MD  [facility] Medicine Team: [facility]  Makenzie Roman PA-C  Primary Care Team: [facility]     HPI:   Yanni Kaiser is a 52 y.o. female with PMHx of asthma, HTN, HLD, T2DM, depression, SHARATH, GERD and morbid obesity, who presents to the ED with dyspnea x 1 day. Patient stated that she began feeling short of breath at home yesterday and developed a productive cough, with clear/yellow phlegm. This morning, patient noted herself to be very winded after getting ready for the day and dropping off her children. She had a PM&R appointment this morning. She was unable to ambulate from the parking lot to the building, without feeling fully winded. It was recommended that she go to the ED for further evaluation. Patient presented with fever, tachypnea and tachycardia. Received treatment and reported improvement in symptoms. Currently, patient is experiencing dyspnea with exertion, cough and lumbar back pain. Denies chest pain, abdominal pain, recent sick contacts, N/V/D.    ED: Vitals reviewed, , /91, febrile 100.8F, RR 25. CBC and CMP unremarkable. BNP  wnl, troponin negative. Lactate 1.0. Blood cultures ordered. CXR with Central hilar findings are concerning for edema or other nonspecific pneumonitis.  No large focal consolidation.  Correlation is advised in this hypoventilatory exam. EKG with NSR. Received duonebs, steroids and continuous albuterol, noted improvement in dyspnea. Required supplemental O2 via NC.       * No surgery found *      Hospital Course:   52 y.o. female admitted to   observation for asthma exacerbation in COPD. Patient treated with supplemental oxygen, scheduled duonebs and steroids. CXR with possible pneumonitis, started on antibiotics for CAP coverage. Procal/lactic acid normal. Respiratory infection panel negative. UA with WBC, no urinary symptoms noted. Improved respiratory symptoms noted, weaned oxygen as tolerated, patient returned to room air. Completed six minute walk test, patient passed, no hypoxia noted. Hyperglycemia noted, started on long acting insulin. Counseled patient that hyperglycemia most likely due to her steroid course and lack of jardiance while inpatient. Patient medically stable for discharge with pulm and PCP f/u.       Goals of Care Treatment Preferences:  Code Status: Full Code      Consults:   Consults (From admission, onward)        Status Ordering Provider     [facility]  Once        Provider:  (Not yet assigned)    Completed JOSÉ LUIS RIBERA          * Asthma exacerbation in COPD  COPD exacerbation  Moderate persistent asthma without complication    52 y.o. female admitted to  observation with asthma exacerbation with COPD. Patient experiencing dyspnea upon exertion, wheezing, tachycardia, tachypnea and fever. Received duonebs, magnesium, steroids and continuous albuterol in the ED, noted improvement in dyspnea. Required supplemental O2 via NC to main oxygen saturation.     - CXR with Central hilar findings are concerning for edema or other nonspecific pneumonitis.  No large focal consolidation.  - Continued home inhalers: breo, albuterol  - Continued singulair 10 mg qhs  - Scheduled duonebs q4h  - Started prednisone 60 mg daily - received 2 doses inpatient  - Started IV azithromycin and IV Ceftriaxone for CAP coverage   - No leukocytosis, lactate normal, procalcitonin negative   - Discontinued antibiotics  - Supplemental oxygen via NC for SpO2>90%    - Weaned to room air   - Patient completed six minute walk test, no  hypoxia noted, no home oxygen needed  - Patient endorsed feeling fluid overloaded, denies history of CHF   - Echo ordered, results below   - No diuresis warranted    Results for orders placed during the hospital encounter of 01/19/23    Echo    Interpretation Summary  · The left ventricle is normal in size with normal systolic function.  · The estimated ejection fraction is 65%.  · Indeterminate left ventricular diastolic function.  · Normal right ventricular size with normal right ventricular systolic function.  · Mild right atrial enlargement.  · Normal central venous pressure (3 mmHg).  · The estimated PA systolic pressure is 36 mmHg.      Type 2 diabetes mellitus with stage 3 chronic kidney disease, with long-term current use of insulin  Patient's FSGs are controlled on current home medication regimen, which is as follows:   - Jardiance 10 mg daily   - Semaglutide 2 mg, every 7 days  Last A1c reviewed-   Lab Results   Component Value Date    HGBA1C 5.8 (H) 12/20/2022     Most recent fingerstick glucose reviewed  Current correctional scale  Medium  Maintain anti-hyperglycemic dose as follows-   Antihyperglycemics (From admission, onward)    Start     Stop Route Frequency Ordered    01/20/23 2345  insulin detemir U-100 pen 12 Units         -- SubQ 2 times daily 01/20/23 2341    01/19/23 1706  insulin aspart U-100 pen 1-10 Units         -- SubQ Before meals & nightly PRN 01/19/23 1606        - Hold Oral hypoglycemics while patient is in the hospital.  - Added insulin regimen when inpatient due to persistently high blood glucose levels  - Counseled patient on hyperglycemia being a common side effect of steroid use  - Resume home medications upon discharge    Essential hypertension  - Continue Hyzaar 100-25 mg daily  - Controlled BP noted   - Monitor BP with q4h vitals    Cigarette nicotine dependence without complication  - Patient endorses currently smoking   - Nicotine patch ordered   - [facility]  patches are better than her home patches. Home patches do not stay adhered to skin, hence her non-compliance   - Patient interested in smoking cessation, has resources from previous admission   - Smoking cessation with respiratory team requested    Morbid obesity  Body mass index is 61.28 kg/m². Morbid obesity complicates all aspects of disease management from diagnostic modalities to treatment. Weight loss encouraged and health benefits explained to patient.  - Patient interested in weight loss resources  - [facility] [place] [facility], patient to f/u  - PCP f/u placed      Final Active Diagnoses:    Diagnosis Date Noted POA    PRINCIPAL PROBLEM:  Asthma exacerbation in COPD [J44.1, J45.901] 06/09/2015 Yes    COPD exacerbation [J44.1] 01/19/2023 Yes     Chronic    Chronic left-sided low back pain with sciatica [M54.40, G89.29] 01/04/2022 Yes    Moderate persistent asthma without complication [J45.40] 03/12/2021 Yes    Stage 3 chronic kidney disease [N18.30] 01/16/2020 Yes     Chronic    Recurrent major depressive disorder, in full remission [F33.42] 01/22/2018 Yes    Type 2 diabetes mellitus with stage 3 chronic kidney disease, with long-term current use of insulin [E11.22, N18.30, Z79.4] 03/08/2017 Not Applicable    GERD (gastroesophageal reflux disease) [K21.9] 07/30/2016 Yes     Chronic    Essential hypertension [I10] 02/04/2016 Yes     Chronic    Anemia of chronic disease [D63.8] 06/10/2015 Yes     Chronic    Morbid obesity [E66.01] 09/03/2014 Yes    Cigarette nicotine dependence without complication [F17.210] 09/03/2014 Yes    SHARATH (obstructive sleep apnea) [G47.33] 03/27/2014 Yes      Problems Resolved During this Admission:       Discharged Condition: good    Disposition: Home or Self Care    Follow Up:   Follow-up Information     Carlyle Gordillo MD. Call.    Specialties: Family Medicine, Wound Care  Contact information:  [address] [facility] [place] [address] [phone]                        Patient Instructions:      Ambulatory referral/consult to Pulmonology   Standing Status: Future   Referral Priority: Routine Referral Type: Consultation   Referral Reason: Specialty Services Required   Requested Specialty: Pulmonary Disease   Number of Visits Requested: 1     Diet diabetic     Notify your health care provider if you experience any of the following:  difficulty breathing or increased cough     Notify your health care provider if you experience any of the following:  temperature >100.4     Activity as tolerated       Significant Diagnostic Studies: Labs:   CMP   Recent Labs   Lab 01/20/23 0317 01/21/23  0234    136   K 4.3 4.3    106   CO2 18* 24   * 323*   BUN 16 23*   CREATININE 1.4 1.2   CALCIUM 9.0 8.9   ANIONGAP 10 6*    and CBC   Recent Labs   Lab 01/20/23 0317 01/21/23  0234   WBC 6.76 8.27   HGB 11.9* 11.7*   HCT 38.5 36.9*    275       Pending Diagnostic Studies:     None         Medications:  Reconciled Home Medications:      Medication List      START taking these medications    predniSONE 20 MG tablet  Commonly known as: DELTASONE  Take 2 tablets (40 mg total) by mouth once daily. for 3 days  Start taking on: January 22, 2023        CONTINUE taking these medications    albuterol 90 mcg/actuation inhaler  Commonly known as: PROVENTIL/VENTOLIN HFA  Inhale 2 puffs into the lungs every 6 (six) hours as needed for Wheezing or Shortness of Breath. Rescue     albuterol-ipratropium 2.5 mg-0.5 mg/3 mL nebulizer solution  Commonly known as: DUO-NEB  Take 3 mLs by nebulization every 6 (six) hours as needed for Wheezing. Rescue     alcohol swabs Padm  Commonly known as: BD ALCOHOL SWABS  Apply 1 each topically 2 (two) times a day.     amitriptyline 25 MG tablet  Commonly known as: ELAVIL  Take 1 tablet (25 mg total) by mouth nightly as needed for Insomnia.     ammonium lactate 12 % Crea  Apply twice daily to affected parts both feet as needed.     azelastine  137 mcg (0.1 %) nasal spray  Commonly known as: ASTELIN  2 sprays (274 mcg total) by Nasal route 2 (two) times daily.     blood sugar diagnostic Strp  1 each by Misc.(Non-Drug; Combo Route) route 4 (four) times daily before meals and nightly. ACCU CHEK ELVIA PLUS METER     blood-glucose meter Misc  Commonly known as: ACCU-CHEK ELVIA PLUS METER  TEST FOUR TIMES DAILY BEFORE MEALS AND EVERY NIGHT     cetirizine 10 MG tablet  Commonly known as: ZYRTEC  Take 1 tablet (10 mg total) by mouth daily as needed for Allergies (itching).     clobetasoL 0.05 % external solution  Commonly known as: TEMOVATE  Apply topically 2 (two) times daily. Prn scalp itch or rash     cyclobenzaprine 10 MG tablet  Commonly known as: FLEXERIL     diclofenac sodium 1 % Gel  Commonly known as: VOLTAREN  Apply 2 g topically 3 (three) times daily. Apply to painful joints     empagliflozin 10 mg tablet  Commonly known as: JARDIANCE  Take 1 tablet (10 mg total) by mouth once daily.     EPINEPHrine 0.3 mg/0.3 mL Atin  Commonly known as: EPIPEN  Inject 0.3 mLs (0.3 mg total) into the muscle once. for 1 dose     fluticasone furoate-vilanteroL 200-25 mcg/dose Dsdv diskus inhaler  Commonly known as: BREO ELLIPTA  Inhale 1 puff into the lungs once daily. Controller     fluticasone propionate 50 mcg/actuation nasal spray  Commonly known as: FLONASE  2 sprays (100 mcg total) by Each Nostril route 2 (two) times a day.     * lancets Misc  Commonly known as: ACCU-CHEK SOFTCLIX LANCETS  1 Device by Misc.(Non-Drug; Combo Route) route 4 (four) times daily.     * lancets Misc  Commonly known as: ACCU-CHEK FASTCLIX LANCET DRUM  1 Device by Misc.(Non-Drug; Combo Route) route 2 (two) times a day.     levocetirizine 5 MG tablet  Commonly known as: XYZAL  Take 1 tablet (5 mg total) by mouth every evening.     LIDOcaine 5 %  Commonly known as: LIDODERM  Place 1 patch onto the skin once daily. Remove & Discard patch within 12 hours or as directed by MD     LIDOcaine HCL  2% 2 % jelly  Commonly known as: XYLOCAINE  Apply topically as needed. Apply topically once nightly to affected part of foot/feet.     LINZESS 145 mcg Cap capsule  Generic drug: linaCLOtide  Take 1 capsule (145 mcg total) by mouth once daily.     losartan-hydrochlorothiazide 100-25 mg 100-25 mg per tablet  Commonly known as: HYZAAR  Take 1 tablet by mouth once daily.     montelukast 10 mg tablet  Commonly known as: SINGULAIR  Take 1 tablet (10 mg total) by mouth every evening.     nicotine 21 mg/24 hr  Commonly known as: NICODERM CQ  Place 1 patch onto the skin once daily.     nystatin-triamcinolone ointment  Commonly known as: MYCOLOG  Apply topically 2 (two) times daily.     ondansetron 8 MG tablet  Commonly known as: ZOFRAN  Take 1 tablet (8 mg total) by mouth every 8 (eight) hours as needed for Nausea.     oxyCODONE-acetaminophen 7.5-325 mg per tablet  Commonly known as: PERCOCET  Take 1 tablet by mouth 3 (three) times daily as needed for Pain.     pantoprazole 40 MG tablet  Commonly known as: PROTONIX  Take 1 tablet (40 mg total) by mouth 2 (two) times daily.     pregabalin 50 MG capsule  Commonly known as: LYRICA  Take 1 capsule (50 mg total) by mouth 3 (three) times daily.     QUEtiapine 25 MG Tab  Commonly known as: SEROQUEL  Take 1 tablet (25 mg total) by mouth once daily.     semaglutide 2 mg/dose (8 mg/3 mL) Pnij  Commonly known as: OZEMPIC  Inject 2 mg into the skin every 7 days.     sulindac 200 MG Tab  Commonly known as: CLINORIL  Take 1 tablet (200 mg total) by mouth 2 (two) times daily.     topiramate 50 MG tablet  Commonly known as: TOPAMAX  Take 2 tablets (100 mg total) by mouth every evening.     triamcinolone acetonide 0.1% 0.1 % cream  Commonly known as: KENALOG  Apply topically 2 (two) times daily. Prn focally for rash spots on forehead and legs.Stop using steroid topical when skin is smooth and non itchy.  Do not treat dark or red coloring.     venlafaxine 75 MG 24 hr capsule  Commonly known  as: EFFEXOR-XR  Take 1 capsule (75 mg total) by mouth once daily.         * This list has 2 medication(s) that are the same as other medications prescribed for you. Read the directions carefully, and ask your doctor or other care provider to review them with you.                Indwelling Lines/Drains at time of discharge:   Lines/Drains/Airways     None                 Time spent on the discharge of patient: 36 minutes         Makenzie Roman PA-C  Department of Hospital Medicine  [facility] 11H

## 2023-01-21 NOTE — NURSING
Reviewed discharge orders, referrals and medications at this time. Removed PIV x1 patient tolerated well. Patient belongings remained at bedside during admission. Staff to transport patient in  to private vehicle home.

## 2023-01-21 NOTE — ASSESSMENT & PLAN NOTE
Patient's FSGs are controlled on current home medication regimen, which is as follows:   - Jardiance 10 mg daily   - Semaglutide 2 mg, every 7 days  Last A1c reviewed-   Lab Results   Component Value Date    HGBA1C 5.8 (H) 12/20/2022     Most recent fingerstick glucose reviewed  Current correctional scale  Medium  Maintain anti-hyperglycemic dose as follows-   Antihyperglycemics (From admission, onward)    Start     Stop Route Frequency Ordered    01/20/23 2345  insulin detemir U-100 pen 12 Units         -- SubQ 2 times daily 01/20/23 2341    01/19/23 1706  insulin aspart U-100 pen 1-10 Units         -- SubQ Before meals & nightly PRN 01/19/23 1606        - Hold Oral hypoglycemics while patient is in the hospital.  - Added insulin regimen when inpatient due to persistently high blood glucose levels  - Counseled patient on hyperglycemia being a common side effect of steroid use  - Resume home medications upon discharge

## 2023-01-22 LAB
BACTERIA BLD CULT: ABNORMAL

## 2023-01-24 LAB — BACTERIA BLD CULT: NORMAL

## 2023-01-24 NOTE — PLAN OF CARE
[facility] - Observation 11H  Discharge Final Note    Primary Care Provider: Carlyle Gordillo MD    Expected Discharge Date: 1/21/2023    Patient discharged to home via personal transportation.     Patient's bedside nurse and patient notified of the above.      Final Discharge Note (most recent)       Final Note - 01/24/23 1539          Final Note    Assessment Type Final Discharge Note (P)      Anticipated Discharge Disposition Home or Self Care (P)         Post-Acute Status    Post-Acute Authorization Other (P)      Other Status No Post-Acute Service Needs (P)                      Important Message from Medicare             Contact Info       Carlyle Gordillo MD   Specialty: Family Medicine, Wound Care   Relationship: PCP - General    [address] [place] [address]   Phone: [phone]       Next Steps: Call            Future Appointments   Date Time Provider Department Center   1/26/2023  9:30 AM Steph Ashton, NP [facility]   1/30/2023  4:30 PM Dane Sanchez, PT [facility]   2/1/2023 11:00 AM Jessica Herrera, PT [facility]   2/3/2023 10:00 AM Staya Marte MD [facility] KAREN EPI [facility]   2/6/2023  4:30 PM Dane Sanchez, PT [facility]   2/8/2023 11:00 AM Jessica Herrera, PT [facility]   2/14/2023  9:00 AM Lawrence Marin MD [facility]   3/7/2023 10:00 AM Candida Huerta MD [facility] ALLIMM [facility]   3/14/2023  1:30 PM BARIATRIC SURG FIN COORD, [facility] BARIAT [facility]   3/17/2023  9:45 AM Deepa Salcedo MD [facility] BARIDARIEL [place] [facility]   6/23/2023  1:40 PM Carlyle Gordillo MD [facility]        scheduled post-discharge follow-up appointment and information added to AVS.     Dianelys Hicks, LMSW [facility]  Ext. [phone]

## 2023-01-25 LAB
BACTERIA BLD CULT: NORMAL
BACTERIA BLD CULT: NORMAL

## 2023-01-26 ENCOUNTER — OFFICE VISIT (OUTPATIENT)
Dept: FAMILY MEDICINE | Facility: CLINIC | Age: 53
End: 2023-01-26
Payer: MEDICARE

## 2023-01-26 VITALS
TEMPERATURE: 98 F | HEIGHT: 64 IN | OXYGEN SATURATION: 99 % | BODY MASS INDEX: 50.02 KG/M2 | DIASTOLIC BLOOD PRESSURE: 62 MMHG | SYSTOLIC BLOOD PRESSURE: 132 MMHG | HEART RATE: 84 BPM | WEIGHT: 293 LBS

## 2023-01-26 DIAGNOSIS — N18.30 STAGE 3 CHRONIC KIDNEY DISEASE, UNSPECIFIED WHETHER STAGE 3A OR 3B CKD: ICD-10-CM

## 2023-01-26 DIAGNOSIS — E11.22 TYPE 2 DIABETES MELLITUS WITH STAGE 3 CHRONIC KIDNEY DISEASE, WITH LONG-TERM CURRENT USE OF INSULIN, UNSPECIFIED WHETHER STAGE 3A OR 3B CKD: ICD-10-CM

## 2023-01-26 DIAGNOSIS — F17.210 CIGARETTE NICOTINE DEPENDENCE WITHOUT COMPLICATION: ICD-10-CM

## 2023-01-26 DIAGNOSIS — N18.30 TYPE 2 DIABETES MELLITUS WITH STAGE 3 CHRONIC KIDNEY DISEASE, WITH LONG-TERM CURRENT USE OF INSULIN, UNSPECIFIED WHETHER STAGE 3A OR 3B CKD: ICD-10-CM

## 2023-01-26 DIAGNOSIS — G89.29 CHRONIC LEFT-SIDED LOW BACK PAIN WITH SCIATICA, SCIATICA LATERALITY UNSPECIFIED: ICD-10-CM

## 2023-01-26 DIAGNOSIS — G47.33 OSA (OBSTRUCTIVE SLEEP APNEA): ICD-10-CM

## 2023-01-26 DIAGNOSIS — J44.1 ASTHMA EXACERBATION IN COPD: ICD-10-CM

## 2023-01-26 DIAGNOSIS — I10 ESSENTIAL HYPERTENSION: ICD-10-CM

## 2023-01-26 DIAGNOSIS — M54.40 CHRONIC LEFT-SIDED LOW BACK PAIN WITH SCIATICA, SCIATICA LATERALITY UNSPECIFIED: ICD-10-CM

## 2023-01-26 DIAGNOSIS — E66.01 MORBID OBESITY: ICD-10-CM

## 2023-01-26 DIAGNOSIS — F33.42 RECURRENT MAJOR DEPRESSIVE DISORDER, IN FULL REMISSION: ICD-10-CM

## 2023-01-26 DIAGNOSIS — J45.901 ASTHMA EXACERBATION IN COPD: ICD-10-CM

## 2023-01-26 DIAGNOSIS — D63.8 ANEMIA OF CHRONIC DISEASE: ICD-10-CM

## 2023-01-26 DIAGNOSIS — Z09 HOSPITAL DISCHARGE FOLLOW-UP: Primary | ICD-10-CM

## 2023-01-26 DIAGNOSIS — Z79.4 TYPE 2 DIABETES MELLITUS WITH STAGE 3 CHRONIC KIDNEY DISEASE, WITH LONG-TERM CURRENT USE OF INSULIN, UNSPECIFIED WHETHER STAGE 3A OR 3B CKD: ICD-10-CM

## 2023-01-26 DIAGNOSIS — J45.40 MODERATE PERSISTENT ASTHMA WITHOUT COMPLICATION: ICD-10-CM

## 2023-01-26 DIAGNOSIS — K21.9 GERD WITHOUT ESOPHAGITIS: ICD-10-CM

## 2023-01-26 DIAGNOSIS — J44.1 COPD EXACERBATION: ICD-10-CM

## 2023-01-26 PROCEDURE — 1160F PR REVIEW ALL MEDS BY PRESCRIBER/CLIN PHARMACIST DOCUMENTED: ICD-10-PCS | Mod: HCNC,CPTII,S$GLB, | Performed by: NURSE PRACTITIONER

## 2023-01-26 PROCEDURE — 3078F PR MOST RECENT DIASTOLIC BLOOD PRESSURE < 80 MM HG: ICD-10-PCS | Mod: HCNC,CPTII,S$GLB, | Performed by: NURSE PRACTITIONER

## 2023-01-26 PROCEDURE — 3008F BODY MASS INDEX DOCD: CPT | Mod: HCNC,CPTII,S$GLB, | Performed by: NURSE PRACTITIONER

## 2023-01-26 PROCEDURE — 99999 PR PBB SHADOW E&M-EST. PATIENT-LVL V: ICD-10-PCS | Mod: PBBFAC,HCNC,, | Performed by: NURSE PRACTITIONER

## 2023-01-26 PROCEDURE — 3075F SYST BP GE 130 - 139MM HG: CPT | Mod: HCNC,CPTII,S$GLB, | Performed by: NURSE PRACTITIONER

## 2023-01-26 PROCEDURE — 99214 PR OFFICE/OUTPT VISIT, EST, LEVL IV, 30-39 MIN: ICD-10-PCS | Mod: HCNC,25,S$GLB, | Performed by: NURSE PRACTITIONER

## 2023-01-26 PROCEDURE — 96372 PR INJECTION,THERAP/PROPH/DIAG2ST, IM OR SUBCUT: ICD-10-PCS | Mod: HCNC,S$GLB,, | Performed by: NURSE PRACTITIONER

## 2023-01-26 PROCEDURE — 96372 THER/PROPH/DIAG INJ SC/IM: CPT | Mod: HCNC,S$GLB,, | Performed by: NURSE PRACTITIONER

## 2023-01-26 PROCEDURE — 3072F LOW RISK FOR RETINOPATHY: CPT | Mod: HCNC,CPTII,S$GLB, | Performed by: NURSE PRACTITIONER

## 2023-01-26 PROCEDURE — 1160F RVW MEDS BY RX/DR IN RCRD: CPT | Mod: HCNC,CPTII,S$GLB, | Performed by: NURSE PRACTITIONER

## 2023-01-26 PROCEDURE — 3075F PR MOST RECENT SYSTOLIC BLOOD PRESS GE 130-139MM HG: ICD-10-PCS | Mod: HCNC,CPTII,S$GLB, | Performed by: NURSE PRACTITIONER

## 2023-01-26 PROCEDURE — 1159F MED LIST DOCD IN RCRD: CPT | Mod: HCNC,CPTII,S$GLB, | Performed by: NURSE PRACTITIONER

## 2023-01-26 PROCEDURE — 1159F PR MEDICATION LIST DOCUMENTED IN MEDICAL RECORD: ICD-10-PCS | Mod: HCNC,CPTII,S$GLB, | Performed by: NURSE PRACTITIONER

## 2023-01-26 PROCEDURE — 99214 OFFICE O/P EST MOD 30 MIN: CPT | Mod: HCNC,25,S$GLB, | Performed by: NURSE PRACTITIONER

## 2023-01-26 PROCEDURE — 3078F DIAST BP <80 MM HG: CPT | Mod: HCNC,CPTII,S$GLB, | Performed by: NURSE PRACTITIONER

## 2023-01-26 PROCEDURE — 99999 PR PBB SHADOW E&M-EST. PATIENT-LVL V: CPT | Mod: PBBFAC,HCNC,, | Performed by: NURSE PRACTITIONER

## 2023-01-26 PROCEDURE — 3072F PR LOW RISK FOR RETINOPATHY: ICD-10-PCS | Mod: HCNC,CPTII,S$GLB, | Performed by: NURSE PRACTITIONER

## 2023-01-26 PROCEDURE — 3008F PR BODY MASS INDEX (BMI) DOCUMENTED: ICD-10-PCS | Mod: HCNC,CPTII,S$GLB, | Performed by: NURSE PRACTITIONER

## 2023-01-26 RX ORDER — DEXAMETHASONE SODIUM PHOSPHATE 4 MG/ML
8 INJECTION, SOLUTION INTRA-ARTICULAR; INTRALESIONAL; INTRAMUSCULAR; INTRAVENOUS; SOFT TISSUE
Status: COMPLETED | OUTPATIENT
Start: 2023-01-26 | End: 2023-01-26

## 2023-01-26 RX ORDER — LANCETS
1 EACH MISCELLANEOUS 4 TIMES DAILY
Qty: 200 EACH | Refills: 3 | Status: SHIPPED | OUTPATIENT
Start: 2023-01-26 | End: 2023-02-09 | Stop reason: CLARIF

## 2023-01-26 RX ORDER — BUDESONIDE AND FORMOTEROL FUMARATE DIHYDRATE 160; 4.5 UG/1; UG/1
2 AEROSOL RESPIRATORY (INHALATION) EVERY 12 HOURS
Qty: 10.2 G | Refills: 0 | Status: SHIPPED | OUTPATIENT
Start: 2023-01-26 | End: 2023-01-27

## 2023-01-26 RX ORDER — DEXTROSE 4 G
TABLET,CHEWABLE ORAL
Qty: 90 EACH | Refills: 1 | Status: SHIPPED | OUTPATIENT
Start: 2023-01-26 | End: 2023-02-09 | Stop reason: CLARIF

## 2023-01-26 RX ADMIN — DEXAMETHASONE SODIUM PHOSPHATE 8 MG: 4 INJECTION, SOLUTION INTRA-ARTICULAR; INTRALESIONAL; INTRAMUSCULAR; INTRAVENOUS; SOFT TISSUE at 10:01

## 2023-01-26 NOTE — PROGRESS NOTES
Chief Complaint  Chief Complaint   Patient presents with    Hospital Follow Up       HPI    HPI   Ms. Yanni Kaiser is a 52 y.o. female with medical problems as lsited below. The patient presents to clinic for hospital follow up.     Hospital course as follows:  HPI:   Yanni Kaiser is a 52 y.o. female with PMHx of asthma, HTN, HLD, T2DM, depression, SHARATH, GERD and morbid obesity, who presents to the ED with dyspnea x 1 day. Patient stated that she began feeling short of breath at home yesterday and developed a productive cough, with clear/yellow phlegm. This morning, patient noted herself to be very winded after getting ready for the day and dropping off her children. She had a PM&R appointment this morning. She was unable to ambulate from the parking lot to the building, without feeling fully winded. It was recommended that she go to the ED for further evaluation. Patient presented with fever, tachypnea and tachycardia. Received treatment and reported improvement in symptoms. Currently, patient is experiencing dyspnea with exertion, cough and lumbar back pain. Denies chest pain, abdominal pain, recent sick contacts, N/V/D.     ED: Vitals reviewed, , /91, febrile 100.8F, RR 25. CBC and CMP unremarkable. BNP  wnl, troponin negative. Lactate 1.0. Blood cultures ordered. CXR with Central hilar findings are concerning for edema or other nonspecific pneumonitis.  No large focal consolidation.  Correlation is advised in this hypoventilatory exam. EKG with NSR. Received duonebs, steroids and continuous albuterol, noted improvement in dyspnea. Required supplemental O2 via NC.         * No surgery found *       Hospital Course:   52 y.o. female admitted to  observation for asthma exacerbation in COPD. Patient treated with supplemental oxygen, scheduled duonebs and steroids. CXR with possible pneumonitis, started on antibiotics for CAP coverage. Procal/lactic acid normal. Respiratory infection  panel negative. UA with WBC, no urinary symptoms noted. Improved respiratory symptoms noted, weaned oxygen as tolerated, patient returned to room air. Completed six minute walk test, patient passed, no hypoxia noted. Hyperglycemia noted, started on long acting insulin. Counseled patient that hyperglycemia most likely due to her steroid course and lack of jardiance while inpatient. Patient medically stable for discharge with pulm and PCP f/u.    Since hospital visit:  She states she feels better but still is not back to 100%. She still having the shortness of breath and some wheezing. She has been doing her nebulizer once a day with some relief of symptoms. She has finished her prednisone that was prescribed in the hospital.     She has not been checking her BS due to her machine being broken. Needs a new machine to check sugar.     Has not been on Ozempic for about  a month. She states she was being followed by bariatrics for this but that they would not refill this medication.        PAST MEDICAL HISTORY:  Past Medical History:   Diagnosis Date    Allergy     Anemia     Anxiety     Asthma     Back pain     Chronic bronchitis     Chronic obstructive pulmonary disease, unspecified COPD type 4/1/2022    Cigarette smoker     DDD (degenerative disc disease), lumbar 6/9/2020    Depression     Diabetes mellitus with peripheral circulatory disorder 4/1/2022    Diabetes with neurologic complications     DUB (dysfunctional uterine bleeding) 10/16/2018    GERD (gastroesophageal reflux disease)     High cholesterol     Hyperprolactinemia     Hypertension     Influenza A 01/16/2020    Neuromuscular disorder     Obese body habitus     Obesity     Pseudotumor cerebri     Renal manifestation of secondary diabetes mellitus     Respiratory failure     Seizures     Simple endometrial hyperplasia 2018    Sleep apnea     Smoker     Tobacco dependence     Urinary incontinence        PAST SURGICAL HISTORY:  Past Surgical History:    Procedure Laterality Date    ANTROSTOMY OF MAXILLARY SINUS AT INFERIOR NASAL MEATUS  10/22/2021    Procedure: MAXILLARY ANTROSTOMY, AT INFERIOR NASAL MEATUS;  Surgeon: John Prado III, MD;  Location: [facility] OR;  Service: ENT;;    BREAST CYST ASPIRATION       SECTION      X 1    CHOLECYSTECTOMY      COLONOSCOPY      COLONOSCOPY N/A 2019    Procedure: COLONOSCOPY;  Surgeon: Jagruti Sweeney MD;  Location: [facility] (2ND FLR);  Service: Endoscopy;  Laterality: N/A;  BMI is 63/gastroparesis/3 days full liquid diet 1 day clear liquid see telephone encounter by Ciara [facility]    EPIDURAL STEROID INJECTION N/A 2020    Procedure: INJECTION, STEROID, EPIDURAL, L5-S1 IL;  Surgeon: Lawrence Marin MD;  Location: [facility] PAIN MGT;  Service: Pain Management;  Laterality: N/A;    ESOPHAGEAL MANOMETRY WITH MEASUREMENT OF IMPEDANCE N/A 2019    Procedure: MANOMETRY-ESOPHAGEAL-WITH IMPEDANCE;  Surgeon: Jagruti Sweeney MD;  Location: [facility] (2ND FLR);  Service: Endoscopy;  Laterality: N/A;  Hold Narcotics x 1 days   Hold TCA x 1 days  Propofol only. No fentanyl or benzodiazepine during sedation. If additional sedation needed, discuss with Dr. Sweeney.  10/29 - pt confirmed appt    ESOPHAGOGASTRODUODENOSCOPY N/A 2019    Procedure: EGD (ESOPHAGOGASTRODUODENOSCOPY);  Surgeon: Jagruti Sweeney MD;  Location: [facility] (2ND FLR);  Service: Endoscopy;  Laterality: N/A;  BMI is 63/gastroparesis/3 days full liquid diet 1 day clear liquid see telephone encounter by Ciara Brown     ESOPHAGOGASTRODUODENOSCOPY N/A 2019    Procedure: ESOPHAGOGASTRODUODENOSCOPY (EGD);  Surgeon: Jagruti Sweeney MD;  Location: [facility] (2ND FLR);  Service: Endoscopy;  Laterality: N/A;  EGD with EndoFlip /+/- Botox  2nd floor for gastroparesis/BMI 63 **367lbs**  Bariatric Stretcher needed  Manometry probe to be placed endoscopically during EGD procedure  Due to change in protocol, Full liquid diet for 3 days/ 1  day of clear liquids  Hi    ESOPHAGOGASTRODUODENOSCOPY N/A 12/14/2020    Procedure: ESOPHAGOGASTRODUODENOSCOPY (EGD) with BRAVO;  Surgeon: Jagruti Sweeney MD;  Location: [facility] ENDO (2ND FLR);  Service: Endoscopy;  Laterality: N/A;  with Dilation  2nd floor due to BMI 56.20 (327 lbs) and gastroparesis  3 days full liquid diet and 1 day clears    ESOPHAGOGASTRODUODENOSCOPY N/A 04/15/2021    Procedure: ESOPHAGOGASTRODUODENOSCOPY (EGD);  Surgeon: Jagruti Sweeney MD;  Location: [facility] ENDO (2ND FLR);  Service: Endoscopy;  Laterality: N/A;  Endoflip  2nd floor-gastroparesis, BMI 57.18, bariatric stretcher  full liquid diet x3 days, clear liquid diet x1 day prior to procedure  propofol only  covid test 4/12 primary care, instructions emailed-[facility]    FOOT FRACTURE SURGERY Left     FUNCTIONAL ENDOSCOPIC SINUS SURGERY (FESS) USING COMPUTER-ASSISTED NAVIGATION Bilateral 10/22/2021    Procedure: FESS, USING COMPUTER-ASSISTED NAVIGATION;  Surgeon: John Prado III, MD;  Location: [facility];  Service: ENT;  Laterality: Bilateral;  FOLLOW DR PRADO ANESTHESIA PROTOCAL / pt will stay 23 hour post surgery for SHARATH observation    HYSTEROSCOPY WITH DILATION AND CURETTAGE OF UTERUS N/A 10/16/2018    KJB    INJECTION OF ANESTHETIC AGENT AROUND NERVE Bilateral 10/23/2020    Procedure: BLOCK, NERVE  bilateral L3,4,5 MBB;  Surgeon: Lawrence Marin MD;  Location: [facility] PAIN MGT;  Service: Pain Management;  Laterality: Bilateral;  bilateral L3,4,5 MBB   consent needed    INJECTION OF ANESTHETIC AGENT AROUND NERVE Bilateral 02/18/2022    Procedure: BLOCK, NERVE, L3-L4-L5 MEDIAL BRANCH;  Surgeon: Lawrence Marin MD;  Location: [facility] PAIN MGT;  Service: Pain Management;  Laterality: Bilateral;    PLANTAR FASCIOTOMY Left 11/18/2019    Procedure: FASCIOTOMY, PLANTAR;  Surgeon: Valentino Orourke DPM;  Location: [facility] 2ND FLR;  Service: Podiatry;  Laterality: Left;    RETINAL LASER PROCEDURE Left     SINUS SURGERY      SPHENOIDECTOMY Bilateral  "10/22/2021    Procedure: SPHENOIDECTOMY;  Surgeon: John Prado III, MD;  Location: [facility] OR;  Service: ENT;  Laterality: Bilateral;    TRANSFORAMINAL EPIDURAL INJECTION OF STEROID Bilateral 06/24/2020    Procedure: INJECTION, STEROID, EPIDURAL, TRANSFORAMINAL APPROACH;  Surgeon: Lawrence Marin MD;  Location: [facility] PAIN MGT;  Service: Pain Management;  Laterality: Bilateral;    TRANSFORAMINAL EPIDURAL INJECTION OF STEROID Bilateral 01/28/2022    Procedure: INJECTION, STEROID, EPIDURAL, TRANSFORAMINAL APPROACH  Bilateral TFESI L4/L5      NEEDS CONSENT;  Surgeon: Lawrence Marin MD;  Location: [facility] PAIN MGT;  Service: Pain Management;  Laterality: Bilateral;    TRANSFORAMINAL EPIDURAL INJECTION OF STEROID Bilateral 12/23/2022    Procedure: INJECTION, STEROID, EPIDURAL, TRANSFORAMINAL APPROACH BILATERAL L4/L5 CONTRAST  NEEDS CONSENT;  Surgeon: Lawrence Marin MD;  Location: [facility] PAIN MGT;  Service: Pain Management;  Laterality: Bilateral;    UPPER GASTROINTESTINAL ENDOSCOPY         SOCIAL HISTORY:  Social History     Socioeconomic History    Marital status:     Number of children: 4   Occupational History    Occupation: disable   Tobacco Use    Smoking status: Every Day     Packs/day: 0.25     Years: 27.00     Pack years: 6.75     Types: Cigarettes     Start date: 1/1/1992     Passive exposure: Current    Smokeless tobacco: Never    Tobacco comments:     "1/2 pack per every 2 days or so"           10/21/22 patient says its less than half a pack now. Has been cutting back.   Substance and Sexual Activity    Alcohol use: Yes     Alcohol/week: 1.0 standard drink     Types: 1 Shots of liquor per week     Comment: occasionally    Drug use: No    Sexual activity: Yes     Partners: Male     Birth control/protection: None     Comment:      Social Determinants of Health     Financial Resource Strain: High Risk    Difficulty of Paying Living Expenses: Very hard   Food Insecurity: Food Insecurity Present    " Worried About Running Out of Food in the Last Year: Often true    Ran Out of Food in the Last Year: Sometimes true   Transportation Needs: Unmet Transportation Needs    Lack of Transportation (Medical): Yes    Lack of Transportation (Non-Medical): Yes   Physical Activity: Inactive    Days of Exercise per Week: 0 days    Minutes of Exercise per Session: 0 min   Stress: Stress Concern Present    Feeling of Stress : To some extent   Social Connections: Unknown    Frequency of Communication with Friends and Family: Three times a week    Frequency of Social Gatherings with Friends and Family: Once a week    Active Member of Clubs or Organizations: Yes    Attends Club or Organization Meetings: More than 4 times per year    Marital Status:    Housing Stability: High Risk    Unable to Pay for Housing in the Last Year: Yes    Number of Places Lived in the Last Year: 1    Unstable Housing in the Last Year: No       FAMILY HISTORY:  Family History   Problem Relation Age of Onset    Hypertension Mother     Diabetes Mother     Colon cancer Mother 50    Colon polyps Mother     Cataracts Mother     Heart disease Father     COPD Father     Heart attack Father     Hypertension Father     Ulcers Father     Cataracts Father     Glaucoma Father     No Known Problems Brother     Diabetes Daughter         prediabetes    Diabetes Daughter         prediabetic    Hypertension Daughter     Obesity Daughter     No Known Problems Daughter     No Known Problems Son     No Known Problems Maternal Aunt     No Known Problems Maternal Uncle     No Known Problems Paternal Aunt     No Known Problems Paternal Uncle     Cancer Maternal Grandmother     Breast cancer Maternal Grandmother     Colon cancer Maternal Grandmother     Colon polyps Maternal Grandmother     No Known Problems Maternal Grandfather     No Known Problems Paternal Grandmother     No Known Problems Paternal Grandfather     Celiac disease Neg Hx     Cirrhosis Neg Hx     Crohn's  disease Neg Hx     Cystic fibrosis Neg Hx     Esophageal cancer Neg Hx     Hemochromatosis Neg Hx     Inflammatory bowel disease Neg Hx     Irritable bowel syndrome Neg Hx     Liver cancer Neg Hx     Liver disease Neg Hx     Rectal cancer Neg Hx     Stomach cancer Neg Hx     Ulcerative colitis Neg Hx     Jonnie's disease Neg Hx     Tuberculosis Neg Hx     Lymphoma Neg Hx     Scleroderma Neg Hx     Rheum arthritis Neg Hx     Melanoma Neg Hx     Multiple sclerosis Neg Hx     Psoriasis Neg Hx     Lupus Neg Hx     Skin cancer Neg Hx     Amblyopia Neg Hx     Blindness Neg Hx     Macular degeneration Neg Hx     Retinal detachment Neg Hx     Strabismus Neg Hx     Stroke Neg Hx     Thyroid disease Neg Hx     Allergic rhinitis Neg Hx     Allergies Neg Hx     Angioedema Neg Hx     Asthma Neg Hx     Eczema Neg Hx     Immunodeficiency Neg Hx     Rhinitis Neg Hx     Urticaria Neg Hx     Atopy Neg Hx        ALLERGIES AND MEDICATIONS: updated and reviewed.  Review of patient's allergies indicates:   Allergen Reactions    Gabapentin Other (See Comments)     Makes her twitch     Current Outpatient Medications   Medication Sig Dispense Refill    albuterol (PROVENTIL/VENTOLIN HFA) 90 mcg/actuation inhaler Inhale 2 puffs into the lungs every 6 (six) hours as needed for Wheezing or Shortness of Breath. Rescue 54 g 6    albuterol-ipratropium (DUO-NEB) 2.5 mg-0.5 mg/3 mL nebulizer solution Take 3 mLs by nebulization every 6 (six) hours as needed for Wheezing. Rescue 75 mL 2    alcohol swabs (BD ALCOHOL SWABS) PadM Apply 1 each topically 2 (two) times a day. 200 each 4    amitriptyline (ELAVIL) 25 MG tablet Take 1 tablet (25 mg total) by mouth nightly as needed for Insomnia. 90 tablet 1    ammonium lactate 12 % Crea Apply twice daily to affected parts both feet as needed. 140 g 11    azelastine (ASTELIN) 137 mcg (0.1 %) nasal spray 2 sprays (274 mcg total) by Nasal route 2 (two) times daily. 60 mL 11    cetirizine (ZYRTEC) 10 MG tablet  Take 1 tablet (10 mg total) by mouth daily as needed for Allergies (itching). 90 tablet 1    clobetasoL (TEMOVATE) 0.05 % external solution Apply topically 2 (two) times daily. Prn scalp itch or rash 60 mL 1    cyclobenzaprine (FLEXERIL) 10 MG tablet       diclofenac sodium (VOLTAREN) 1 % Gel Apply 2 g topically 3 (three) times daily. Apply to painful joints 300 g 2    empagliflozin (JARDIANCE) 10 mg tablet Take 1 tablet (10 mg total) by mouth once daily. 90 tablet 1    fluticasone propionate (FLONASE) 50 mcg/actuation nasal spray 2 sprays (100 mcg total) by Each Nostril route 2 (two) times a day. 32 g 11    lancets (ACCU-CHEK FASTCLIX LANCET DRUM) Misc 1 Device by Misc.(Non-Drug; Combo Route) route 2 (two) times a day. 200 each 4    levocetirizine (XYZAL) 5 MG tablet Take 1 tablet (5 mg total) by mouth every evening. 90 tablet 1    lidocaine (LIDODERM) 5 % Place 1 patch onto the skin once daily. Remove & Discard patch within 12 hours or as directed by MD 15 patch 0    LIDOcaine HCL 2% (XYLOCAINE) 2 % jelly Apply topically as needed. Apply topically once nightly to affected part of foot/feet. 30 mL 2    LINZESS 145 mcg Cap capsule Take 1 capsule (145 mcg total) by mouth once daily. 90 capsule 1    losartan-hydrochlorothiazide 100-25 mg (HYZAAR) 100-25 mg per tablet Take 1 tablet by mouth once daily. 90 tablet 3    montelukast (SINGULAIR) 10 mg tablet Take 1 tablet (10 mg total) by mouth every evening. 90 tablet 1    nicotine (NICODERM CQ) 21 mg/24 hr Place 1 patch onto the skin once daily. 28 patch 0    nystatin-triamcinolone (MYCOLOG) ointment Apply topically 2 (two) times daily. 60 g 2    ondansetron (ZOFRAN) 8 MG tablet Take 1 tablet (8 mg total) by mouth every 8 (eight) hours as needed for Nausea. 90 tablet 11    oxyCODONE-acetaminophen (PERCOCET) 7.5-325 mg per tablet Take 1 tablet by mouth 3 (three) times daily as needed for Pain. 90 tablet 0    pantoprazole (PROTONIX) 40 MG tablet Take 1 tablet (40 mg total)  by mouth 2 (two) times daily. 180 tablet 1    pregabalin (LYRICA) 50 MG capsule Take 1 capsule (50 mg total) by mouth 3 (three) times daily. 90 capsule 3    QUEtiapine (SEROQUEL) 25 MG Tab Take 1 tablet (25 mg total) by mouth once daily. 90 tablet 1    semaglutide 2 mg/dose (8 mg/3 mL) PnIj Inject 2 mg into the skin every 7 days. 9 mL 0    sulindac (CLINORIL) 200 MG Tab Take 1 tablet (200 mg total) by mouth 2 (two) times daily. 30 tablet 0    topiramate (TOPAMAX) 50 MG tablet Take 2 tablets (100 mg total) by mouth every evening. 180 tablet 1    triamcinolone acetonide 0.1% (KENALOG) 0.1 % cream Apply topically 2 (two) times daily. Prn focally for rash spots on forehead and legs.Stop using steroid topical when skin is smooth and non itchy.  Do not treat dark or red coloring. 45 g 0    venlafaxine (EFFEXOR-XR) 75 MG 24 hr capsule Take 1 capsule (75 mg total) by mouth once daily. 90 capsule 1    blood sugar diagnostic Strp 1 each by Misc.(Non-Drug; Combo Route) route 4 (four) times daily before meals and nightly. ACCU CHEK ELVIA PLUS METER 200 each 3    blood-glucose meter (ACCU-CHEK ELVIA PLUS METER) [facility] TEST FOUR TIMES DAILY BEFORE MEALS AND EVERY NIGHT 90 each 1    budesonide-formoterol 160-4.5 mcg (SYMBICORT) 160-4.5 mcg/actuation HFAA Inhale 2 puffs into the lungs every 12 (twelve) hours. Controller 10.2 g 0    EPINEPHrine (EPIPEN) 0.3 mg/0.3 mL AtIn Inject 0.3 mLs (0.3 mg total) into the muscle once. for 1 dose 2 each 1    lancets (ACCU-CHEK SOFTCLIX LANCETS) Misc 1 Device by Misc.(Non-Drug; Combo Route) route 4 (four) times daily. 200 each 3     Current Facility-Administered Medications   Medication Dose Route Frequency Provider Last Rate Last Admin    albuterol inhaler 2 puff  2 puff Inhalation Q20 Min PRN Carlyle Gordillo MD        EPINEPHrine (EPIPEN) 0.3 mg/0.3 mL pen injection 0.3 mg  0.3 mg Intramuscular PRN Carlyle Gordillo MD           Patient Care Team:  Carlyle Gordillo MD as PCP - General (Family  "Medicine)  Deepa Salcedo MD as Consulting Physician (Bariatrics)  Elisha Huber, CTTS as Day Respiratory Care Practitioner (CTTS)  Rosa Bosch OD as Consulting Physician (Optometry)  Jagruti Sweeney MD as Consulting Physician (Gastroenterology)  KAMILA Crawford as Nurse Practitioner (Pain Medicine)  Glenn Black Jr., MD as Obstetrician (Obstetrics)  Satya Marte MD as Consulting Physician (Neurology)  Mona Barrow LPN as Care Coordinator  Satya Lopez MD as Consulting Physician (Gastroenterology)    ROS  Review of Systems   Constitutional:  Negative for chills, fatigue, fever and unexpected weight change.   HENT:  Negative for congestion, ear discharge, ear pain and postnasal drip.    Eyes:  Negative for photophobia, pain and visual disturbance.   Respiratory:  Positive for cough, shortness of breath and wheezing.    Cardiovascular:  Negative for chest pain, palpitations and leg swelling.   Gastrointestinal:  Negative for abdominal pain, constipation, diarrhea, nausea and vomiting.   Genitourinary:  Negative for dysuria, frequency, urgency and vaginal discharge.   Musculoskeletal:  Negative for back pain, joint swelling and neck stiffness.   Skin:  Negative for rash.   Neurological:  Negative for weakness and headaches.   Psychiatric/Behavioral:  Negative for dysphoric mood and sleep disturbance. The patient is not nervous/anxious.          Physical Exam  Vitals:    01/26/23 0931   BP: 132/62   BP Location: Left arm   Patient Position: Sitting   BP Method: Large (Manual)   Pulse: 84   Temp: 98.4 °F (36.9 °C)   TempSrc: Oral   SpO2: 99%   Weight: (!) 168.2 kg (370 lb 13 oz)   Height: 5' 4" (1.626 m)    Body mass index is 63.65 kg/m².  Weight: (!) 168.2 kg (370 lb 13 oz)   Height: 5' 4" (162.6 cm)     Physical Exam  Constitutional:       Appearance: She is well-developed.   HENT:      Head: Normocephalic and atraumatic.      Right Ear: External ear normal.      Left Ear: " External ear normal.      Nose: Nose normal.   Eyes:      Extraocular Movements: Extraocular movements intact.   Cardiovascular:      Rate and Rhythm: Normal rate and regular rhythm.   Pulmonary:      Effort: Pulmonary effort is normal.      Breath sounds: Examination of the right-upper field reveals wheezing. Examination of the left-upper field reveals wheezing. Examination of the right-middle field reveals wheezing. Examination of the left-middle field reveals wheezing. Examination of the right-lower field reveals wheezing. Examination of the left-lower field reveals wheezing. Wheezing present.   Musculoskeletal:         General: Normal range of motion.      Cervical back: Normal range of motion.   Skin:     General: Skin is warm and dry.   Neurological:      Mental Status: She is alert and oriented to person, place, and time.   Psychiatric:         Behavior: Behavior normal.           Health Maintenance         Date Due Completion Date    Sign Pain Contract Never done ---    COVID-19 Vaccine (4 - Booster for Pfizer series) 02/24/2022 12/30/2021    Shingles Vaccine (1 of 2) Never done ---    Eye Exam 05/30/2023 5/30/2022    Diabetes Urine Screening 06/20/2023 6/20/2022    Lipid Panel 06/20/2023 6/20/2022    Hemoglobin A1c 06/20/2023 12/20/2022    Override on 5/1/2015: Done    Foot Exam 10/18/2023 10/18/2022    Override on 3/7/2018: Done    Mammogram 12/16/2023 12/16/2021    Colorectal Cancer Screening 08/10/2026 8/10/2021    Cervical Cancer Screening 01/31/2027 1/31/2022    TETANUS VACCINE 08/19/2029 8/19/2019    Pneumococcal Vaccines (Age 0-64) (3 - PPSV23 if available, else PCV20) 08/04/2037 4/25/2017          Health maintenance reviewed at this time.     Assessment & Plan  Hospital discharge follow-up  Has been doing ok  Feel better but not back to baseline  Will give a steroid shot in clinic.    Does not feel like breo was working and would like to go back onto Symbicort as she feels this medication was more  helpful    Still needs to follow up with pulmonology  for hospital f/u.     Asthma exacerbation in COPD/COPD exacerbation  -     budesonide-formoterol 160-4.5 mcg (SYMBICORT) 160-4.5 mcg/actuation HFAA; Inhale 2 puffs into the lungs every 12 (twelve) hours. Controller  Dispense: 10.2 g; Refill: 0  -     dexAMETHasone injection 8 mg    Moderate persistent asthma without complication  -     budesonide-formoterol 160-4.5 mcg (SYMBICORT) 160-4.5 mcg/actuation HFAA; Inhale 2 puffs into the lungs every 12 (twelve) hours. Controller  Dispense: 10.2 g; Refill: 0  -     dexAMETHasone injection 8 mg    SHARATH (obstructive sleep apnea)  Stable. Monitor.     Anemia of chronic disease  Stable. Monitor.     Essential hypertension  The current medical regimen is effective;  continue present plan and medications.    GERD without esophagitis  The current medical regimen is effective;  continue present plan and medications.    Type 2 diabetes mellitus with stage 3 chronic kidney disease, with long-term current use of insulin, unspecified whether stage 3a or 3b CKD  -     blood sugar diagnostic Strp; 1 each by Misc.(Non-Drug; Combo Route) route 4 (four) times daily before meals and nightly. ACCU CHEK ELVIA PLUS METER  Dispense: 200 each; Refill: 3  -     blood-glucose meter (ACCU-CHEK ELVIA PLUS METER) Misc; TEST FOUR TIMES DAILY BEFORE MEALS AND EVERY NIGHT  Dispense: 90 each; Refill: 1  -     lancets (ACCU-CHEK SOFTCLIX LANCETS) Misc; 1 Device by Misc.(Non-Drug; Combo Route) route 4 (four) times daily.  Dispense: 200 each; Refill: 3  The current medical regimen is effective;  continue present plan and medications.    Stage 3 chronic kidney disease, unspecified whether stage 3a or 3b CKD  Stable. Monitor.     Morbid obesity  The patient is asked to make an attempt to improve diet and exercise patterns to aid in medical management of this problem.  Will follow back up with bariatrics.    Cigarette nicotine dependence without  complication  Has resources for smoking cessation.    Recurrent major depressive disorder, in full remission  The current medical regimen is effective;  continue present plan and medications.    Chronic left-sided low back pain with sciatica, sciatica laterality unspecified  The current medical regimen is effective;  continue present plan and medications.    Type 2 diabetes mellitus with stage 3 chronic kidney disease, with long-term current use of insulin  -     blood sugar diagnostic Strp; 1 each by Misc.(Non-Drug; Combo Route) route 4 (four) times daily before meals and nightly. ACCU CHEK ELVIA PLUS METER  Dispense: 200 each; Refill: 3  -     blood-glucose meter (ACCU-CHEK ELVIA PLUS METER) Misc; TEST FOUR TIMES DAILY BEFORE MEALS AND EVERY NIGHT  Dispense: 90 each; Refill: 1  -     lancets (ACCU-CHEK SOFTCLIX LANCETS) Misc; 1 Device by Misc.(Non-Drug; Combo Route) route 4 (four) times daily.  Dispense: 200 each; Refill: 3    The current medical regimen is effective;  continue present plan and medications.         Follow-up: Follow up if symptoms worsen or fail to improve.

## 2023-01-26 NOTE — PROGRESS NOTES
IM Dexamethasone 8 mg  injection administered as ordered. Patient tolerated well without difficulty.

## 2023-02-01 ENCOUNTER — PATIENT MESSAGE (OUTPATIENT)
Dept: PAIN MEDICINE | Facility: CLINIC | Age: 53
End: 2023-02-01
Payer: MEDICARE

## 2023-02-03 ENCOUNTER — OFFICE VISIT (OUTPATIENT)
Dept: NEUROLOGY | Facility: CLINIC | Age: 53
End: 2023-02-03
Payer: MEDICARE

## 2023-02-03 VITALS
WEIGHT: 293 LBS | SYSTOLIC BLOOD PRESSURE: 135 MMHG | DIASTOLIC BLOOD PRESSURE: 90 MMHG | BODY MASS INDEX: 50.02 KG/M2 | HEIGHT: 64 IN | HEART RATE: 88 BPM

## 2023-02-03 DIAGNOSIS — E11.51 DIABETES MELLITUS WITH PERIPHERAL CIRCULATORY DISORDER: ICD-10-CM

## 2023-02-03 DIAGNOSIS — G43.009 MIGRAINE WITHOUT AURA AND WITHOUT STATUS MIGRAINOSUS, NOT INTRACTABLE: ICD-10-CM

## 2023-02-03 DIAGNOSIS — E44.1 MILD PROTEIN MALNUTRITION: ICD-10-CM

## 2023-02-03 PROCEDURE — 1159F PR MEDICATION LIST DOCUMENTED IN MEDICAL RECORD: ICD-10-PCS | Mod: HCNC,CPTII,S$GLB, | Performed by: PSYCHIATRY & NEUROLOGY

## 2023-02-03 PROCEDURE — 99214 OFFICE O/P EST MOD 30 MIN: CPT | Mod: HCNC,S$GLB,, | Performed by: PSYCHIATRY & NEUROLOGY

## 2023-02-03 PROCEDURE — 99999 PR PBB SHADOW E&M-EST. PATIENT-LVL IV: CPT | Mod: PBBFAC,HCNC,, | Performed by: PSYCHIATRY & NEUROLOGY

## 2023-02-03 PROCEDURE — 99214 PR OFFICE/OUTPT VISIT, EST, LEVL IV, 30-39 MIN: ICD-10-PCS | Mod: HCNC,S$GLB,, | Performed by: PSYCHIATRY & NEUROLOGY

## 2023-02-03 PROCEDURE — 3072F PR LOW RISK FOR RETINOPATHY: ICD-10-PCS | Mod: HCNC,CPTII,S$GLB, | Performed by: PSYCHIATRY & NEUROLOGY

## 2023-02-03 PROCEDURE — 3075F PR MOST RECENT SYSTOLIC BLOOD PRESS GE 130-139MM HG: ICD-10-PCS | Mod: HCNC,CPTII,S$GLB, | Performed by: PSYCHIATRY & NEUROLOGY

## 2023-02-03 PROCEDURE — 3008F PR BODY MASS INDEX (BMI) DOCUMENTED: ICD-10-PCS | Mod: HCNC,CPTII,S$GLB, | Performed by: PSYCHIATRY & NEUROLOGY

## 2023-02-03 PROCEDURE — 1159F MED LIST DOCD IN RCRD: CPT | Mod: HCNC,CPTII,S$GLB, | Performed by: PSYCHIATRY & NEUROLOGY

## 2023-02-03 PROCEDURE — 3008F BODY MASS INDEX DOCD: CPT | Mod: HCNC,CPTII,S$GLB, | Performed by: PSYCHIATRY & NEUROLOGY

## 2023-02-03 PROCEDURE — 3075F SYST BP GE 130 - 139MM HG: CPT | Mod: HCNC,CPTII,S$GLB, | Performed by: PSYCHIATRY & NEUROLOGY

## 2023-02-03 PROCEDURE — 3072F LOW RISK FOR RETINOPATHY: CPT | Mod: HCNC,CPTII,S$GLB, | Performed by: PSYCHIATRY & NEUROLOGY

## 2023-02-03 PROCEDURE — 3080F DIAST BP >= 90 MM HG: CPT | Mod: HCNC,CPTII,S$GLB, | Performed by: PSYCHIATRY & NEUROLOGY

## 2023-02-03 PROCEDURE — 99999 PR PBB SHADOW E&M-EST. PATIENT-LVL IV: ICD-10-PCS | Mod: PBBFAC,HCNC,, | Performed by: PSYCHIATRY & NEUROLOGY

## 2023-02-03 PROCEDURE — 3080F PR MOST RECENT DIASTOLIC BLOOD PRESSURE >= 90 MM HG: ICD-10-PCS | Mod: HCNC,CPTII,S$GLB, | Performed by: PSYCHIATRY & NEUROLOGY

## 2023-02-03 RX ORDER — METHYLPREDNISOLONE 4 MG/1
TABLET ORAL
Qty: 21 TABLET | Refills: 0 | Status: SHIPPED | OUTPATIENT
Start: 2023-02-03 | End: 2023-02-09 | Stop reason: CLARIF

## 2023-02-03 RX ORDER — SUCRALFATE 1 G/10ML
1 SUSPENSION ORAL 3 TIMES DAILY
COMMUNITY
Start: 2023-01-10

## 2023-02-03 RX ORDER — RIMEGEPANT SULFATE 75 MG/75MG
75 TABLET, ORALLY DISINTEGRATING ORAL ONCE AS NEEDED
Qty: 12 TABLET | Refills: 2 | Status: SHIPPED | OUTPATIENT
Start: 2023-02-03 | End: 2023-06-26

## 2023-02-03 RX ORDER — TOPIRAMATE 100 MG/1
150 TABLET, FILM COATED ORAL NIGHTLY
Qty: 180 TABLET | Refills: 1 | Status: SHIPPED | OUTPATIENT
Start: 2023-02-03 | End: 2024-02-03

## 2023-02-03 NOTE — PROGRESS NOTES
[facility] - NEUROLOGY 7TH FL [facility], [place]    Date: 2/3/23  Patient Name: Yanni Kaiser   MRN: [id number]   PCP: Carlyle Gordillo  Referring Provider: Self, Aaareferral    Assessment:   Yanni Kaiser is a 52 y.o. female Presenting in follow-up for management of headache.  Patient with current exacerbation and medication overuse headache.  Counseled patient on discontinuation of overuse Tylenol and inappropriate use of opioids for migraine management.  Increasing Topamax to 150 mg as previously discussed and we prescribing Nurtec for rescue.  Medrol Dosepak provided for withdrawal period.  Patient is known diabetic and will be monitoring her blood sugar closely while on steroids.  Plan:     Problem List Items Addressed This Visit          Neuro    Migraine (Chronic)    Overview     Patient referred for Botox due to her poor response to multiple preventive medications in the past and her frequency of headache occurring almost daily         Relevant Medications    topiramate (TOPAMAX) 100 MG tablet       Endocrine    Mild protein malnutrition (Chronic)    Diabetes mellitus with peripheral circulatory disorder       Satya Marte MD  [facility]   Department of Neurology    Patient note was created using MModal Dictation.  Any errors in syntax or even information may not have been identified and edited on initial review prior to signing this note.  Subjective:        HPI:   Ms. Yanni Kaiser is a 52 y.o. female Presenting in follow-up for management of migraine headache.  Patient has been lost to follow-up for nearly a year.  Over the last several months she reports severe headache every single day.  She is taking Tylenol multiple times every day as well as Percocet she has left over from a back surgery for management of her headache.  She did not increase her Topamax as directed at her last visit and has not tried Nurtec for rescue.  With  regard to her remote history of seizure, she denies any seizures since her last visit and has been seizure-free for years since her visit with Dr. Moore in 2017.    PAST MEDICAL HISTORY:  Past Medical History:   Diagnosis Date    Allergy     Anemia     Anxiety     Asthma     Back pain     Chronic bronchitis     Chronic obstructive pulmonary disease, unspecified COPD type 2022    Cigarette smoker     DDD (degenerative disc disease), lumbar 2020    Depression     Diabetes mellitus with peripheral circulatory disorder 2022    Diabetes with neurologic complications     DUB (dysfunctional uterine bleeding) 10/16/2018    GERD (gastroesophageal reflux disease)     High cholesterol     Hyperprolactinemia     Hypertension     Influenza A 2020    Neuromuscular disorder     Obese body habitus     Obesity     Pseudotumor cerebri     Renal manifestation of secondary diabetes mellitus     Respiratory failure     Seizures     Simple endometrial hyperplasia     Sleep apnea     Smoker     Tobacco dependence     Urinary incontinence        PAST SURGICAL HISTORY:  Past Surgical History:   Procedure Laterality Date    ANTROSTOMY OF MAXILLARY SINUS AT INFERIOR NASAL MEATUS  10/22/2021    Procedure: MAXILLARY ANTROSTOMY, AT INFERIOR NASAL MEATUS;  Surgeon: John Prado III, MD;  Location: [facility] OR;  Service: ENT;;    BREAST CYST ASPIRATION       SECTION      X 1    CHOLECYSTECTOMY      COLONOSCOPY      COLONOSCOPY N/A 2019    Procedure: COLONOSCOPY;  Surgeon: Jagruti Sweeney MD;  Location: [address]);  Service: Endoscopy;  Laterality: N/A;  BMI is 63/gastroparesis/3 days full liquid diet 1 day clear liquid see telephone encounter by Ciara limon    EPIDURAL STEROID INJECTION N/A 2020    Procedure: INJECTION, STEROID, EPIDURAL, L5-S1 IL;  Surgeon: Lawrence Marin MD;  Location: [facility] PAIN MGT;  Service: Pain Management;  Laterality: N/A;    ESOPHAGEAL MANOMETRY WITH  MEASUREMENT OF IMPEDANCE N/A 11/05/2019    Procedure: MANOMETRY-ESOPHAGEAL-WITH IMPEDANCE;  Surgeon: Jagruti Sweeney MD;  Location: [facility] (2ND FLR);  Service: Endoscopy;  Laterality: N/A;  Hold Narcotics x 1 days   Hold TCA x 1 days  Propofol only. No fentanyl or benzodiazepine during sedation. If additional sedation needed, discuss with Dr. Sweeney.  10/29 - pt confirmed appt    ESOPHAGOGASTRODUODENOSCOPY N/A 01/14/2019    Procedure: EGD (ESOPHAGOGASTRODUODENOSCOPY);  Surgeon: Jagruti Sweeney MD;  Location: [facility] (2ND FLR);  Service: Endoscopy;  Laterality: N/A;  BMI is 63/gastroparesis/3 days full liquid diet 1 day clear liquid see telephone encounter by Ciara limon    ESOPHAGOGASTRODUODENOSCOPY N/A 11/05/2019    Procedure: ESOPHAGOGASTRODUODENOSCOPY (EGD);  Surgeon: Jagruti Sweeney MD;  Location: [facility] (2ND FLR);  Service: Endoscopy;  Laterality: N/A;  EGD with EndoFlip /+/- Botox  2nd floor for gastroparesis/BMI 63 **367lbs**  Bariatric Stretcher needed  Manometry probe to be placed endoscopically during EGD procedure  Due to change in protocol, Full liquid diet for 3 days/ 1 day of clear liquids  Hi    ESOPHAGOGASTRODUODENOSCOPY N/A 12/14/2020    Procedure: ESOPHAGOGASTRODUODENOSCOPY (EGD) with BRAVO;  Surgeon: Jagruti Sweeney MD;  Location: [facility] (2ND FLR);  Service: Endoscopy;  Laterality: N/A;  with Dilation  2nd floor due to BMI 56.20 (327 lbs) and gastroparesis  3 days full liquid diet and 1 day clears    ESOPHAGOGASTRODUODENOSCOPY N/A 04/15/2021    Procedure: ESOPHAGOGASTRODUODENOSCOPY (EGD);  Surgeon: Jagruti Sweeney MD;  Location: [facility] (2ND FLR);  Service: Endoscopy;  Laterality: N/A;  Endoflip  2nd floor-gastroparesis, BMI 57.18, bariatric stretcher  full liquid diet x3 days, clear liquid diet x1 day prior to procedure  propofol only  covid test 4/12 primary care, instructions emailed-[facility]    FOOT FRACTURE SURGERY Left     FUNCTIONAL ENDOSCOPIC SINUS SURGERY (FESS)  USING COMPUTER-ASSISTED NAVIGATION Bilateral 10/22/2021    Procedure: FESS, USING COMPUTER-ASSISTED NAVIGATION;  Surgeon: John Prado III, MD;  Location: [facility] OR;  Service: ENT;  Laterality: Bilateral;  FOLLOW DR PRADO ANESTHESIA PROTOCAL / pt will stay 23 hour post surgery for SHARATH observation    HYSTEROSCOPY WITH DILATION AND CURETTAGE OF UTERUS N/A 10/16/2018    KJB    INJECTION OF ANESTHETIC AGENT AROUND NERVE Bilateral 10/23/2020    Procedure: BLOCK, NERVE  bilateral L3,4,5 MBB;  Surgeon: Lawrence Marin MD;  Location: [facility] PAIN MGT;  Service: Pain Management;  Laterality: Bilateral;  bilateral L3,4,5 MBB   consent needed    INJECTION OF ANESTHETIC AGENT AROUND NERVE Bilateral 02/18/2022    Procedure: BLOCK, NERVE, L3-L4-L5 MEDIAL BRANCH;  Surgeon: Lawrence Marin MD;  Location: [facility] PAIN MGT;  Service: Pain Management;  Laterality: Bilateral;    PLANTAR FASCIOTOMY Left 11/18/2019    Procedure: FASCIOTOMY, PLANTAR;  Surgeon: Valentino Orourke DPM;  Location: [address];  Service: Podiatry;  Laterality: Left;    RETINAL LASER PROCEDURE Left     SINUS SURGERY      SPHENOIDECTOMY Bilateral 10/22/2021    Procedure: SPHENOIDECTOMY;  Surgeon: John Prado III, MD;  Location: [facility];  Service: ENT;  Laterality: Bilateral;    TRANSFORAMINAL EPIDURAL INJECTION OF STEROID Bilateral 06/24/2020    Procedure: INJECTION, STEROID, EPIDURAL, TRANSFORAMINAL APPROACH;  Surgeon: Lawrence Marin MD;  Location: [facility] PAIN MGT;  Service: Pain Management;  Laterality: Bilateral;    TRANSFORAMINAL EPIDURAL INJECTION OF STEROID Bilateral 01/28/2022    Procedure: INJECTION, STEROID, EPIDURAL, TRANSFORAMINAL APPROACH  Bilateral TFESI L4/L5      NEEDS CONSENT;  Surgeon: Lawrence Marin MD;  Location: [facility] PAIN MGT;  Service: Pain Management;  Laterality: Bilateral;    TRANSFORAMINAL EPIDURAL INJECTION OF STEROID Bilateral 12/23/2022    Procedure: INJECTION, STEROID, EPIDURAL, TRANSFORAMINAL APPROACH BILATERAL L4/L5  CONTRAST  NEEDS CONSENT;  Surgeon: Lawrence Marin MD;  Location: [facility];  Service: Pain Management;  Laterality: Bilateral;    UPPER GASTROINTESTINAL ENDOSCOPY         CURRENT MEDS:  Current Outpatient Medications   Medication Sig Dispense Refill    albuterol (PROVENTIL/VENTOLIN HFA) 90 mcg/actuation inhaler Inhale 2 puffs into the lungs every 6 (six) hours as needed for Wheezing or Shortness of Breath. Rescue 54 g 6    albuterol-ipratropium (DUO-NEB) 2.5 mg-0.5 mg/3 mL nebulizer solution Take 3 mLs by nebulization every 6 (six) hours as needed for Wheezing. Rescue 75 mL 2    amitriptyline (ELAVIL) 25 MG tablet Take 1 tablet (25 mg total) by mouth nightly as needed for Insomnia. 90 tablet 1    ammonium lactate 12 % Crea Apply twice daily to affected parts both feet as needed. 140 g 11    azelastine (ASTELIN) 137 mcg (0.1 %) nasal spray 2 sprays (274 mcg total) by Nasal route 2 (two) times daily. 60 mL 11    budesonide-formoterol 160-4.5 mcg (SYMBICORT) 160-4.5 mcg/actuation HFAA INHALE 2 PUFFS INTO THE LUNGS EVERY 12 HOURS 30.6 g 3    cetirizine (ZYRTEC) 10 MG tablet Take 1 tablet (10 mg total) by mouth daily as needed for Allergies (itching). 90 tablet 1    cyclobenzaprine (FLEXERIL) 10 MG tablet       empagliflozin (JARDIANCE) 10 mg tablet Take 1 tablet (10 mg total) by mouth once daily. 90 tablet 1    fluticasone propionate (FLONASE) 50 mcg/actuation nasal spray 2 sprays (100 mcg total) by Each Nostril route 2 (two) times a day. 32 g 11    levocetirizine (XYZAL) 5 MG tablet Take 1 tablet (5 mg total) by mouth every evening. 90 tablet 1    LINZESS 145 mcg Cap capsule Take 1 capsule (145 mcg total) by mouth once daily. 90 capsule 1    losartan-hydrochlorothiazide 100-25 mg (HYZAAR) 100-25 mg per tablet Take 1 tablet by mouth once daily. 90 tablet 3    montelukast (SINGULAIR) 10 mg tablet Take 1 tablet (10 mg total) by mouth every evening. 90 tablet 1    ondansetron (ZOFRAN) 8 MG tablet Take 1 tablet (8 mg  total) by mouth every 8 (eight) hours as needed for Nausea. 90 tablet 11    oxyCODONE-acetaminophen (PERCOCET) 7.5-325 mg per tablet Take 1 tablet by mouth 3 (three) times daily as needed for Pain. 90 tablet 0    pantoprazole (PROTONIX) 40 MG tablet Take 1 tablet (40 mg total) by mouth 2 (two) times daily. 180 tablet 1    pregabalin (LYRICA) 50 MG capsule Take 1 capsule (50 mg total) by mouth 3 (three) times daily. 90 capsule 3    QUEtiapine (SEROQUEL) 25 MG Tab Take 1 tablet (25 mg total) by mouth once daily. 90 tablet 1    sucralfate (CARAFATE) 100 mg/mL suspension Take 1 g by mouth 3 (three) times daily.      sulindac (CLINORIL) 200 MG Tab Take 1 tablet (200 mg total) by mouth 2 (two) times daily. 30 tablet 0    venlafaxine (EFFEXOR-XR) 75 MG 24 hr capsule Take 1 capsule (75 mg total) by mouth once daily. 90 capsule 1    alcohol swabs (BD ALCOHOL SWABS) PadM Apply 1 each topically 2 (two) times a day. 200 each 4    blood sugar diagnostic Strp 1 each by Misc.(Non-Drug; Combo Route) route 4 (four) times daily before meals and nightly. ACCU CHEK ELVIA PLUS METER 200 each 3    blood-glucose meter (ACCU-CHEK ELVIA PLUS METER) Misc TEST FOUR TIMES DAILY BEFORE MEALS AND EVERY NIGHT 90 each 1    clobetasoL (TEMOVATE) 0.05 % external solution Apply topically 2 (two) times daily. Prn scalp itch or rash 60 mL 1    diclofenac sodium (VOLTAREN) 1 % Gel Apply 2 g topically 3 (three) times daily. Apply to painful joints 300 g 2    EPINEPHrine (EPIPEN) 0.3 mg/0.3 mL AtIn Inject 0.3 mLs (0.3 mg total) into the muscle once. for 1 dose (Patient not taking: Reported on 2/3/2023) 2 each 1    lancets (ACCU-CHEK FASTCLIX LANCET DRUM) Misc 1 Device by Misc.(Non-Drug; Combo Route) route 2 (two) times a day. 200 each 4    lancets (ACCU-CHEK SOFTCLIX LANCETS) Misc 1 Device by Misc.(Non-Drug; Combo Route) route 4 (four) times daily. 200 each 3    lidocaine (LIDODERM) 5 % Place 1 patch onto the skin once daily. Remove & Discard patch within  12 hours or as directed by MD 15 patch 0    LIDOcaine HCL 2% (XYLOCAINE) 2 % jelly Apply topically as needed. Apply topically once nightly to affected part of foot/feet. 30 mL 2    methylPREDNISolone (MEDROL DOSEPACK) 4 mg tablet use as directed 21 tablet 0    nicotine (NICODERM CQ) 21 mg/24 hr Place 1 patch onto the skin once daily. 28 patch 0    nystatin-triamcinolone (MYCOLOG) ointment Apply topically 2 (two) times daily. (Patient not taking: Reported on 2/3/2023) 60 g 2    rimegepant (NURTEC) 75 mg odt Take 1 tablet (75 mg total) by mouth once as needed for Migraine. Place ODT tablet on the tongue; alternatively the ODT tablet may be placed under the tongue 12 tablet 2    semaglutide 2 mg/dose (8 mg/3 mL) PnIj Inject 2 mg into the skin every 7 days. (Patient not taking: Reported on 2/3/2023) 9 mL 0    topiramate (TOPAMAX) 100 MG tablet Take 1.5 tablets (150 mg total) by mouth every evening. 180 tablet 1    triamcinolone acetonide 0.1% (KENALOG) 0.1 % cream Apply topically 2 (two) times daily. Prn focally for rash spots on forehead and legs.Stop using steroid topical when skin is smooth and non itchy.  Do not treat dark or red coloring. 45 g 0     Current Facility-Administered Medications   Medication Dose Route Frequency Provider Last Rate Last Admin    albuterol inhaler 2 puff  2 puff Inhalation Q20 Min PRN Carlyle Gordillo MD        EPINEPHrine (EPIPEN) 0.3 mg/0.3 mL pen injection 0.3 mg  0.3 mg Intramuscular PRN Carlyle Gordillo MD           ALLERGIES:  Review of patient's allergies indicates:   Allergen Reactions    Gabapentin Other (See Comments)     Makes her twitch       FAMILY HISTORY:  Family History   Problem Relation Age of Onset    Hypertension Mother     Diabetes Mother     Colon cancer Mother 50    Colon polyps Mother     Cataracts Mother     Heart disease Father     COPD Father     Heart attack Father     Hypertension Father     Ulcers Father     Cataracts Father     Glaucoma Father     No Known  "Problems Brother     Diabetes Daughter         prediabetes    Diabetes Daughter         prediabetic    Hypertension Daughter     Obesity Daughter     No Known Problems Daughter     No Known Problems Son     No Known Problems Maternal Aunt     No Known Problems Maternal Uncle     No Known Problems Paternal Aunt     No Known Problems Paternal Uncle     Cancer Maternal Grandmother     Breast cancer Maternal Grandmother     Colon cancer Maternal Grandmother     Colon polyps Maternal Grandmother     No Known Problems Maternal Grandfather     No Known Problems Paternal Grandmother     No Known Problems Paternal Grandfather     Celiac disease Neg Hx     Cirrhosis Neg Hx     Crohn's disease Neg Hx     Cystic fibrosis Neg Hx     Esophageal cancer Neg Hx     Hemochromatosis Neg Hx     Inflammatory bowel disease Neg Hx     Irritable bowel syndrome Neg Hx     Liver cancer Neg Hx     Liver disease Neg Hx     Rectal cancer Neg Hx     Stomach cancer Neg Hx     Ulcerative colitis Neg Hx     Jonnie's disease Neg Hx     Tuberculosis Neg Hx     Lymphoma Neg Hx     Scleroderma Neg Hx     Rheum arthritis Neg Hx     Melanoma Neg Hx     Multiple sclerosis Neg Hx     Psoriasis Neg Hx     Lupus Neg Hx     Skin cancer Neg Hx     Amblyopia Neg Hx     Blindness Neg Hx     Macular degeneration Neg Hx     Retinal detachment Neg Hx     Strabismus Neg Hx     Stroke Neg Hx     Thyroid disease Neg Hx     Allergic rhinitis Neg Hx     Allergies Neg Hx     Angioedema Neg Hx     Asthma Neg Hx     Eczema Neg Hx     Immunodeficiency Neg Hx     Rhinitis Neg Hx     Urticaria Neg Hx     Atopy Neg Hx        SOCIAL HISTORY:  Social History     Tobacco Use    Smoking status: Every Day     Packs/day: 0.25     Years: 27.00     Pack years: 6.75     Types: Cigarettes     Start date: 1/1/1992     Passive exposure: Current    Smokeless tobacco: Never    Tobacco comments:     "1/2 pack per every 2 days or so"           10/21/22 patient says its less than half a pack " "now. Has been cutting back.   Substance Use Topics    Alcohol use: Yes     Alcohol/week: 1.0 standard drink     Types: 1 Shots of liquor per week     Comment: occasionally    Drug use: No       Review of Systems:  12 system review of systems is negative except for the symptoms mentioned in HPI.      Objective:     Vitals:    02/03/23 1008   BP: (!) 135/90   Pulse: 88   Weight: (!) 159.9 kg (352 lb 10 oz)   Height: 5' 4" (1.626 m)     General: NAD, well nourished   Eyes: no tearing, discharge, no erythema   ENT: moist mucous membranes of the oral cavity, nares patent    Neck: Supple, full range of motion  Cardiovascular: Warm and well perfused, pulses equal and symmetrical  Lungs: Normal work of breathing, normal chest wall excursions  Skin: No rash, lesions, or breakdown on exposed skin  Psychiatry: Mood and affect are appropriate   Abdomen: soft, non tender, non distended  Extremeties: No cyanosis, clubbing or edema.    Neurological   MENTAL STATUS: Alert and oriented to person, place, and time. Attention and concentration within normal limits. Speech without dysarthria, able to name and repeat without difficulty. Recent and remote memory within normal limits   CRANIAL NERVES: Visual fields intact. PERRL. EOMI. Facial sensation intact. Face symmetrical. Hearing grossly intact. Full shoulder shrug bilaterally. Tongue protrudes midline   SENSORY: Sensation is intact to light touch throughout.    MOTOR: Normal bulk and tone. 5/5 deltoid, biceps, triceps, interosseous, hand  bilaterally. 5/5 iliopsoas, knee extension/flexion, foot dorsi/plantarflexion bilaterally.    REFLEXES: Symmetric and 2+ throughout. Toes down going bilaterally.   CEREBELLAR/COORDINATION/GAIT: Gait steady wt. Normal rapid alternating movements.         "

## 2023-02-07 ENCOUNTER — TELEPHONE (OUTPATIENT)
Dept: PHARMACY | Facility: CLINIC | Age: 53
End: 2023-02-07
Payer: MEDICARE

## 2023-02-09 ENCOUNTER — OFFICE VISIT (OUTPATIENT)
Dept: FAMILY MEDICINE | Facility: CLINIC | Age: 53
End: 2023-02-09
Payer: MEDICARE

## 2023-02-09 ENCOUNTER — HOSPITAL ENCOUNTER (OUTPATIENT)
Facility: HOSPITAL | Age: 53
Discharge: LEFT BEFORE DISCHARGE | End: 2023-02-09
Attending: EMERGENCY MEDICINE | Admitting: STUDENT IN AN ORGANIZED HEALTH CARE EDUCATION/TRAINING PROGRAM
Payer: MEDICARE

## 2023-02-09 ENCOUNTER — PATIENT MESSAGE (OUTPATIENT)
Dept: BARIATRICS | Facility: CLINIC | Age: 53
End: 2023-02-09
Payer: MEDICARE

## 2023-02-09 ENCOUNTER — TELEPHONE (OUTPATIENT)
Dept: FAMILY MEDICINE | Facility: CLINIC | Age: 53
End: 2023-02-09

## 2023-02-09 VITALS
HEART RATE: 79 BPM | WEIGHT: 293 LBS | HEART RATE: 92 BPM | DIASTOLIC BLOOD PRESSURE: 74 MMHG | WEIGHT: 293 LBS | BODY MASS INDEX: 50.02 KG/M2 | TEMPERATURE: 98 F | TEMPERATURE: 98 F | HEIGHT: 64 IN | HEIGHT: 64 IN | OXYGEN SATURATION: 95 % | BODY MASS INDEX: 50.02 KG/M2 | DIASTOLIC BLOOD PRESSURE: 59 MMHG | SYSTOLIC BLOOD PRESSURE: 116 MMHG | RESPIRATION RATE: 27 BRPM | OXYGEN SATURATION: 96 % | RESPIRATION RATE: 16 BRPM | SYSTOLIC BLOOD PRESSURE: 120 MMHG

## 2023-02-09 DIAGNOSIS — N17.9 AKI (ACUTE KIDNEY INJURY): ICD-10-CM

## 2023-02-09 DIAGNOSIS — Z79.4 TYPE 2 DIABETES MELLITUS WITH STAGE 3 CHRONIC KIDNEY DISEASE, WITH LONG-TERM CURRENT USE OF INSULIN, UNSPECIFIED WHETHER STAGE 3A OR 3B CKD: Primary | ICD-10-CM

## 2023-02-09 DIAGNOSIS — Z00.00 ENCOUNTER FOR MEDICARE ANNUAL WELLNESS EXAM: ICD-10-CM

## 2023-02-09 DIAGNOSIS — N18.30 TYPE 2 DIABETES MELLITUS WITH STAGE 3 CHRONIC KIDNEY DISEASE, WITH LONG-TERM CURRENT USE OF INSULIN, UNSPECIFIED WHETHER STAGE 3A OR 3B CKD: Primary | ICD-10-CM

## 2023-02-09 DIAGNOSIS — E11.22 TYPE 2 DIABETES MELLITUS WITH STAGE 3 CHRONIC KIDNEY DISEASE, WITH LONG-TERM CURRENT USE OF INSULIN, UNSPECIFIED WHETHER STAGE 3A OR 3B CKD: Primary | ICD-10-CM

## 2023-02-09 DIAGNOSIS — R07.9 CHEST PAIN: ICD-10-CM

## 2023-02-09 DIAGNOSIS — R73.9 HYPERGLYCEMIA: ICD-10-CM

## 2023-02-09 PROBLEM — F33.42 RECURRENT MAJOR DEPRESSIVE DISORDER, IN FULL REMISSION: Chronic | Status: ACTIVE | Noted: 2018-01-22

## 2023-02-09 LAB
ALBUMIN SERPL BCP-MCNC: 3.6 G/DL (ref 3.5–5.2)
ALLENS TEST: ABNORMAL
ALP SERPL-CCNC: 83 U/L (ref 55–135)
ALT SERPL W/O P-5'-P-CCNC: 28 U/L (ref 10–44)
ANION GAP SERPL CALC-SCNC: 11 MMOL/L (ref 8–16)
AST SERPL-CCNC: 23 U/L (ref 10–40)
B-OH-BUTYR BLD STRIP-SCNC: 0.4 MMOL/L (ref 0–0.5)
BACTERIA #/AREA URNS HPF: ABNORMAL /HPF
BASOPHILS # BLD AUTO: 0.06 K/UL (ref 0–0.2)
BASOPHILS NFR BLD: 0.7 % (ref 0–1.9)
BILIRUB SERPL-MCNC: 0.5 MG/DL (ref 0.1–1)
BILIRUB UR QL STRIP: NEGATIVE
BNP SERPL-MCNC: <10 PG/ML (ref 0–99)
BUN SERPL-MCNC: 17 MG/DL (ref 6–20)
CALCIUM SERPL-MCNC: 9.3 MG/DL (ref 8.7–10.5)
CHLORIDE SERPL-SCNC: 103 MMOL/L (ref 95–110)
CLARITY UR: CLEAR
CO2 SERPL-SCNC: 23 MMOL/L (ref 23–29)
COLOR UR: COLORLESS
CREAT SERPL-MCNC: 2 MG/DL (ref 0.5–1.4)
DIFFERENTIAL METHOD: ABNORMAL
EOSINOPHIL # BLD AUTO: 0.2 K/UL (ref 0–0.5)
EOSINOPHIL NFR BLD: 2.7 % (ref 0–8)
ERYTHROCYTE [DISTWIDTH] IN BLOOD BY AUTOMATED COUNT: 13.2 % (ref 11.5–14.5)
EST. GFR  (NO RACE VARIABLE): 30 ML/MIN/1.73 M^2
GLUCOSE SERPL-MCNC: 479 MG/DL (ref 70–110)
GLUCOSE UR QL STRIP: ABNORMAL
HCO3 UR-SCNC: 24.8 MMOL/L (ref 24–28)
HCT VFR BLD AUTO: 42.5 % (ref 37–48.5)
HGB BLD-MCNC: 14.4 G/DL (ref 12–16)
HGB UR QL STRIP: NEGATIVE
IMM GRANULOCYTES # BLD AUTO: 0.02 K/UL (ref 0–0.04)
IMM GRANULOCYTES NFR BLD AUTO: 0.2 % (ref 0–0.5)
KETONES UR QL STRIP: NEGATIVE
LEUKOCYTE ESTERASE UR QL STRIP: NEGATIVE
LYMPHOCYTES # BLD AUTO: 2.4 K/UL (ref 1–4.8)
LYMPHOCYTES NFR BLD: 28.5 % (ref 18–48)
MCH RBC QN AUTO: 31.4 PG (ref 27–31)
MCHC RBC AUTO-ENTMCNC: 33.9 G/DL (ref 32–36)
MCV RBC AUTO: 93 FL (ref 82–98)
MICROSCOPIC COMMENT: ABNORMAL
MONOCYTES # BLD AUTO: 0.6 K/UL (ref 0.3–1)
MONOCYTES NFR BLD: 7.6 % (ref 4–15)
NEUTROPHILS # BLD AUTO: 5 K/UL (ref 1.8–7.7)
NEUTROPHILS NFR BLD: 60.3 % (ref 38–73)
NITRITE UR QL STRIP: NEGATIVE
NRBC BLD-RTO: 0 /100 WBC
PCO2 BLDA: 47.4 MMHG (ref 35–45)
PH SMN: 7.33 [PH] (ref 7.35–7.45)
PH UR STRIP: 6 [PH] (ref 5–8)
PLATELET # BLD AUTO: 341 K/UL (ref 150–450)
PMV BLD AUTO: 9.7 FL (ref 9.2–12.9)
PO2 BLDA: 33 MMHG (ref 40–60)
POC BE: -2 MMOL/L
POC SATURATED O2: 59 % (ref 95–100)
POC TCO2: 26 MMOL/L (ref 24–29)
POCT GLUCOSE: 296 MG/DL (ref 70–110)
POCT GLUCOSE: 369 MG/DL (ref 70–110)
POCT GLUCOSE: >500 MG/DL (ref 70–110)
POTASSIUM SERPL-SCNC: 5 MMOL/L (ref 3.5–5.1)
PROT SERPL-MCNC: 8.3 G/DL (ref 6–8.4)
PROT UR QL STRIP: NEGATIVE
RBC # BLD AUTO: 4.58 M/UL (ref 4–5.4)
RBC #/AREA URNS HPF: 5 /HPF (ref 0–4)
SAMPLE: ABNORMAL
SITE: ABNORMAL
SODIUM SERPL-SCNC: 137 MMOL/L (ref 136–145)
SP GR UR STRIP: >1.03 (ref 1–1.03)
SQUAMOUS #/AREA URNS HPF: 2 /HPF
TROPONIN I SERPL DL<=0.01 NG/ML-MCNC: <0.006 NG/ML (ref 0–0.03)
URN SPEC COLLECT METH UR: ABNORMAL
UROBILINOGEN UR STRIP-ACNC: NEGATIVE EU/DL
WBC # BLD AUTO: 8.29 K/UL (ref 3.9–12.7)
WBC #/AREA URNS HPF: 2 /HPF (ref 0–5)
YEAST URNS QL MICRO: ABNORMAL

## 2023-02-09 PROCEDURE — 85025 COMPLETE CBC W/AUTO DIFF WBC: CPT | Mod: HCNC | Performed by: EMERGENCY MEDICINE

## 2023-02-09 PROCEDURE — 1159F MED LIST DOCD IN RCRD: CPT | Mod: HCNC,CPTII,S$GLB, | Performed by: NURSE PRACTITIONER

## 2023-02-09 PROCEDURE — 3072F LOW RISK FOR RETINOPATHY: CPT | Mod: HCNC,CPTII,S$GLB, | Performed by: NURSE PRACTITIONER

## 2023-02-09 PROCEDURE — 99999 PR PBB SHADOW E&M-EST. PATIENT-LVL V: ICD-10-PCS | Mod: PBBFAC,HCNC,, | Performed by: NURSE PRACTITIONER

## 2023-02-09 PROCEDURE — 81000 URINALYSIS NONAUTO W/SCOPE: CPT | Mod: HCNC | Performed by: EMERGENCY MEDICINE

## 2023-02-09 PROCEDURE — 93010 ELECTROCARDIOGRAM REPORT: CPT | Mod: HCNC,,, | Performed by: INTERNAL MEDICINE

## 2023-02-09 PROCEDURE — 84484 ASSAY OF TROPONIN QUANT: CPT | Mod: HCNC | Performed by: EMERGENCY MEDICINE

## 2023-02-09 PROCEDURE — 83880 ASSAY OF NATRIURETIC PEPTIDE: CPT | Mod: HCNC | Performed by: EMERGENCY MEDICINE

## 2023-02-09 PROCEDURE — 25000003 PHARM REV CODE 250: Mod: HCNC | Performed by: HOSPITALIST

## 2023-02-09 PROCEDURE — 3074F PR MOST RECENT SYSTOLIC BLOOD PRESSURE < 130 MM HG: ICD-10-PCS | Mod: HCNC,CPTII,S$GLB, | Performed by: NURSE PRACTITIONER

## 2023-02-09 PROCEDURE — G0378 HOSPITAL OBSERVATION PER HR: HCPCS | Mod: HCNC

## 2023-02-09 PROCEDURE — 82962 GLUCOSE BLOOD TEST: CPT | Mod: HCNC,91

## 2023-02-09 PROCEDURE — 99900035 HC TECH TIME PER 15 MIN (STAT): Mod: HCNC

## 2023-02-09 PROCEDURE — 99999 PR PBB SHADOW E&M-EST. PATIENT-LVL V: CPT | Mod: PBBFAC,HCNC,, | Performed by: NURSE PRACTITIONER

## 2023-02-09 PROCEDURE — 99214 OFFICE O/P EST MOD 30 MIN: CPT | Mod: HCNC,S$GLB,, | Performed by: NURSE PRACTITIONER

## 2023-02-09 PROCEDURE — 63600175 PHARM REV CODE 636 W HCPCS: Mod: HCNC | Performed by: EMERGENCY MEDICINE

## 2023-02-09 PROCEDURE — 96360 HYDRATION IV INFUSION INIT: CPT | Mod: HCNC

## 2023-02-09 PROCEDURE — 3008F BODY MASS INDEX DOCD: CPT | Mod: HCNC,CPTII,S$GLB, | Performed by: NURSE PRACTITIONER

## 2023-02-09 PROCEDURE — 1160F RVW MEDS BY RX/DR IN RCRD: CPT | Mod: HCNC,CPTII,S$GLB, | Performed by: NURSE PRACTITIONER

## 2023-02-09 PROCEDURE — 3008F PR BODY MASS INDEX (BMI) DOCUMENTED: ICD-10-PCS | Mod: HCNC,CPTII,S$GLB, | Performed by: NURSE PRACTITIONER

## 2023-02-09 PROCEDURE — 63600175 PHARM REV CODE 636 W HCPCS: Mod: HCNC | Performed by: HOSPITALIST

## 2023-02-09 PROCEDURE — 3078F DIAST BP <80 MM HG: CPT | Mod: HCNC,CPTII,S$GLB, | Performed by: NURSE PRACTITIONER

## 2023-02-09 PROCEDURE — 3078F PR MOST RECENT DIASTOLIC BLOOD PRESSURE < 80 MM HG: ICD-10-PCS | Mod: HCNC,CPTII,S$GLB, | Performed by: NURSE PRACTITIONER

## 2023-02-09 PROCEDURE — 3074F SYST BP LT 130 MM HG: CPT | Mod: HCNC,CPTII,S$GLB, | Performed by: NURSE PRACTITIONER

## 2023-02-09 PROCEDURE — 1160F PR REVIEW ALL MEDS BY PRESCRIBER/CLIN PHARMACIST DOCUMENTED: ICD-10-PCS | Mod: HCNC,CPTII,S$GLB, | Performed by: NURSE PRACTITIONER

## 2023-02-09 PROCEDURE — 82010 KETONE BODYS QUAN: CPT | Mod: HCNC | Performed by: EMERGENCY MEDICINE

## 2023-02-09 PROCEDURE — 99285 EMERGENCY DEPT VISIT HI MDM: CPT | Mod: 25,HCNC

## 2023-02-09 PROCEDURE — 80053 COMPREHEN METABOLIC PANEL: CPT | Mod: HCNC | Performed by: EMERGENCY MEDICINE

## 2023-02-09 PROCEDURE — 96372 THER/PROPH/DIAG INJ SC/IM: CPT | Performed by: HOSPITALIST

## 2023-02-09 PROCEDURE — 1159F PR MEDICATION LIST DOCUMENTED IN MEDICAL RECORD: ICD-10-PCS | Mod: HCNC,CPTII,S$GLB, | Performed by: NURSE PRACTITIONER

## 2023-02-09 PROCEDURE — 93005 ELECTROCARDIOGRAM TRACING: CPT | Mod: HCNC

## 2023-02-09 PROCEDURE — 99214 PR OFFICE/OUTPT VISIT, EST, LEVL IV, 30-39 MIN: ICD-10-PCS | Mod: HCNC,S$GLB,, | Performed by: NURSE PRACTITIONER

## 2023-02-09 PROCEDURE — 25000003 PHARM REV CODE 250: Mod: HCNC | Performed by: EMERGENCY MEDICINE

## 2023-02-09 PROCEDURE — 93010 EKG 12-LEAD: ICD-10-PCS | Mod: HCNC,,, | Performed by: INTERNAL MEDICINE

## 2023-02-09 PROCEDURE — 82803 BLOOD GASES ANY COMBINATION: CPT | Mod: HCNC

## 2023-02-09 PROCEDURE — 3072F PR LOW RISK FOR RETINOPATHY: ICD-10-PCS | Mod: HCNC,CPTII,S$GLB, | Performed by: NURSE PRACTITIONER

## 2023-02-09 PROCEDURE — 96361 HYDRATE IV INFUSION ADD-ON: CPT | Mod: HCNC

## 2023-02-09 RX ORDER — IBUPROFEN 200 MG
24 TABLET ORAL
Status: DISCONTINUED | OUTPATIENT
Start: 2023-02-09 | End: 2023-02-10 | Stop reason: HOSPADM

## 2023-02-09 RX ORDER — SODIUM CHLORIDE 9 MG/ML
INJECTION, SOLUTION INTRAVENOUS CONTINUOUS
Status: DISCONTINUED | OUTPATIENT
Start: 2023-02-09 | End: 2023-02-10 | Stop reason: HOSPADM

## 2023-02-09 RX ORDER — GLIPIZIDE 2.5 MG/1
2.5 TABLET, EXTENDED RELEASE ORAL
Qty: 90 TABLET | Refills: 0 | Status: SHIPPED | OUTPATIENT
Start: 2023-02-09 | End: 2023-02-24

## 2023-02-09 RX ORDER — VENLAFAXINE HYDROCHLORIDE 75 MG/1
75 CAPSULE, EXTENDED RELEASE ORAL DAILY
Status: DISCONTINUED | OUTPATIENT
Start: 2023-02-10 | End: 2023-02-10 | Stop reason: HOSPADM

## 2023-02-09 RX ORDER — MAG HYDROX/ALUMINUM HYD/SIMETH 200-200-20
30 SUSPENSION, ORAL (FINAL DOSE FORM) ORAL 4 TIMES DAILY PRN
Status: DISCONTINUED | OUTPATIENT
Start: 2023-02-09 | End: 2023-02-10 | Stop reason: HOSPADM

## 2023-02-09 RX ORDER — IPRATROPIUM BROMIDE 0.5 MG/2.5ML
0.5 SOLUTION RESPIRATORY (INHALATION) EVERY 4 HOURS PRN
Status: DISCONTINUED | OUTPATIENT
Start: 2023-02-09 | End: 2023-02-10 | Stop reason: HOSPADM

## 2023-02-09 RX ORDER — NALOXONE HCL 0.4 MG/ML
0.02 VIAL (ML) INJECTION
Status: DISCONTINUED | OUTPATIENT
Start: 2023-02-09 | End: 2023-02-10 | Stop reason: HOSPADM

## 2023-02-09 RX ORDER — SIMETHICONE 80 MG
1 TABLET,CHEWABLE ORAL 4 TIMES DAILY PRN
Status: DISCONTINUED | OUTPATIENT
Start: 2023-02-09 | End: 2023-02-10 | Stop reason: HOSPADM

## 2023-02-09 RX ORDER — PROCHLORPERAZINE EDISYLATE 5 MG/ML
5 INJECTION INTRAMUSCULAR; INTRAVENOUS EVERY 6 HOURS PRN
Status: DISCONTINUED | OUTPATIENT
Start: 2023-02-09 | End: 2023-02-10 | Stop reason: HOSPADM

## 2023-02-09 RX ORDER — IBUPROFEN 200 MG
16 TABLET ORAL
Status: DISCONTINUED | OUTPATIENT
Start: 2023-02-09 | End: 2023-02-10 | Stop reason: HOSPADM

## 2023-02-09 RX ORDER — INSULIN ASPART 100 [IU]/ML
1-10 INJECTION, SOLUTION INTRAVENOUS; SUBCUTANEOUS
Status: DISCONTINUED | OUTPATIENT
Start: 2023-02-09 | End: 2023-02-10 | Stop reason: HOSPADM

## 2023-02-09 RX ORDER — ACETAMINOPHEN 325 MG/1
650 TABLET ORAL EVERY 6 HOURS PRN
Status: DISCONTINUED | OUTPATIENT
Start: 2023-02-09 | End: 2023-02-10 | Stop reason: HOSPADM

## 2023-02-09 RX ORDER — ONDANSETRON 2 MG/ML
4 INJECTION INTRAMUSCULAR; INTRAVENOUS EVERY 8 HOURS PRN
Status: DISCONTINUED | OUTPATIENT
Start: 2023-02-09 | End: 2023-02-10 | Stop reason: HOSPADM

## 2023-02-09 RX ORDER — ALBUTEROL SULFATE 2.5 MG/.5ML
2.5 SOLUTION RESPIRATORY (INHALATION) EVERY 4 HOURS PRN
Status: DISCONTINUED | OUTPATIENT
Start: 2023-02-09 | End: 2023-02-10 | Stop reason: HOSPADM

## 2023-02-09 RX ORDER — POLYETHYLENE GLYCOL 3350 17 G/17G
17 POWDER, FOR SOLUTION ORAL DAILY
Status: DISCONTINUED | OUTPATIENT
Start: 2023-02-10 | End: 2023-02-10 | Stop reason: HOSPADM

## 2023-02-09 RX ORDER — GLUCAGON 1 MG
1 KIT INJECTION
Status: DISCONTINUED | OUTPATIENT
Start: 2023-02-09 | End: 2023-02-10 | Stop reason: HOSPADM

## 2023-02-09 RX ORDER — SEMAGLUTIDE 1.34 MG/ML
1 INJECTION, SOLUTION SUBCUTANEOUS
Qty: 4 PEN | Refills: 0 | Status: SHIPPED | OUTPATIENT
Start: 2023-02-09 | End: 2023-02-24 | Stop reason: SDUPTHER

## 2023-02-09 RX ORDER — TALC
6 POWDER (GRAM) TOPICAL NIGHTLY PRN
Status: DISCONTINUED | OUTPATIENT
Start: 2023-02-09 | End: 2023-02-10 | Stop reason: HOSPADM

## 2023-02-09 RX ORDER — QUETIAPINE FUMARATE 25 MG/1
25 TABLET, FILM COATED ORAL DAILY
Status: DISCONTINUED | OUTPATIENT
Start: 2023-02-10 | End: 2023-02-10 | Stop reason: HOSPADM

## 2023-02-09 RX ORDER — PREGABALIN 25 MG/1
50 CAPSULE ORAL 3 TIMES DAILY
Status: DISCONTINUED | OUTPATIENT
Start: 2023-02-09 | End: 2023-02-10 | Stop reason: HOSPADM

## 2023-02-09 RX ORDER — ENOXAPARIN SODIUM 100 MG/ML
40 INJECTION SUBCUTANEOUS EVERY 24 HOURS
Status: DISCONTINUED | OUTPATIENT
Start: 2023-02-09 | End: 2023-02-10 | Stop reason: HOSPADM

## 2023-02-09 RX ORDER — MONTELUKAST SODIUM 10 MG/1
10 TABLET ORAL NIGHTLY
Status: DISCONTINUED | OUTPATIENT
Start: 2023-02-09 | End: 2023-02-10 | Stop reason: HOSPADM

## 2023-02-09 RX ORDER — SODIUM CHLORIDE 0.9 % (FLUSH) 0.9 %
10 SYRINGE (ML) INJECTION EVERY 8 HOURS PRN
Status: DISCONTINUED | OUTPATIENT
Start: 2023-02-09 | End: 2023-02-10 | Stop reason: HOSPADM

## 2023-02-09 RX ORDER — IPRATROPIUM BROMIDE AND ALBUTEROL SULFATE 2.5; .5 MG/3ML; MG/3ML
3 SOLUTION RESPIRATORY (INHALATION) EVERY 4 HOURS PRN
Status: DISCONTINUED | OUTPATIENT
Start: 2023-02-09 | End: 2023-02-09

## 2023-02-09 RX ORDER — AMITRIPTYLINE HYDROCHLORIDE 25 MG/1
25 TABLET, FILM COATED ORAL NIGHTLY PRN
Status: DISCONTINUED | OUTPATIENT
Start: 2023-02-09 | End: 2023-02-10 | Stop reason: HOSPADM

## 2023-02-09 RX ADMIN — INSULIN DETEMIR 15 UNITS: 100 INJECTION, SOLUTION SUBCUTANEOUS at 10:02

## 2023-02-09 RX ADMIN — MONTELUKAST 10 MG: 10 TABLET, FILM COATED ORAL at 10:02

## 2023-02-09 RX ADMIN — SODIUM CHLORIDE 1000 ML: 9 INJECTION, SOLUTION INTRAVENOUS at 03:02

## 2023-02-09 RX ADMIN — SODIUM CHLORIDE, POTASSIUM CHLORIDE, SODIUM LACTATE AND CALCIUM CHLORIDE 1000 ML: 600; 310; 30; 20 INJECTION, SOLUTION INTRAVENOUS at 04:02

## 2023-02-09 RX ADMIN — ENOXAPARIN SODIUM 40 MG: 40 INJECTION SUBCUTANEOUS at 06:02

## 2023-02-09 NOTE — ED NOTES
Adult Physical Assessment  LOC: Yanni Kaiser, 52 y.o. female verified via two identifiers.  The patient is awake, alert, oriented and speaking appropriately at this time.  APPEARANCE: Patient resting comfortably and appears to be in no acute distress at this time. Patient is clean and well groomed, patient's clothing is properly fastened.  SKIN:The skin is warm and dry, color consistent with ethnicity, patient has normal skin turgor and moist mucus membranes, skin intact, no breakdown or brusing noted.  MUSCULOSKELETAL: Patient moving all extremities well, no obvious swelling or deformities noted.  RESPIRATORY: Airway is open and patent, respirations are spontaneous, patient has a normal effort and rate, no accessory muscle use noted.  CARDIAC: Patient has a normal rate and rhythm, no periphreal edema noted in any extremity, capillary refill < 3 seconds in all extremities  ABDOMEN: Morbidly obese, moves around very well considering her girth. Soft and non tender to palpation, no abdominal distention noted. Bowel sounds present in all four quadrants.  NEUROLOGIC: Eyes open spontaneously, behavior appropriate to situation, follows commands, facial expression symmetrical, bilateral hand grasp equal and even, purposeful motor response noted, normal sensation in all extremities when touched with a finger.

## 2023-02-09 NOTE — PROGRESS NOTES
Routine Office Visit    Patient Name: Yanni Kaiser    : 1970  MRN: [id number]    Chief Complaint:  Elevated blood sugars     Subjective:  Yanni is a 52 y.o. female who presents today for:    1. Elevated blood sugars - patient who is known to me with a history of CKD, obesity, diabetes, chronic pain, chronic bronchitis reports today for evaluation.  Recently got a new blood sugar machine.  She states that she checked her blood pressure last night and the machine just read high this morning she had a fasting blood sugar of 362.  Recently she has been on steroids multiple times for breathing issues and she is currently on steroids for headaches.  She states her Ozempic was not restarted by her bariatric medicine provider for unknown reasons.  She has been compliant with her Jardiance however.  She states for last week or so now she has been having urinary frequency as well as jitteriness without new shortness of breath or chest pain.  No reported palpitations.  No significant leg swelling.  She did take some of her 's NovoLog and short-acting insulin at the same time last night because of the elevated sugars.  She is unsure exactly how much of this she took.    Past Medical History  Past Medical History:   Diagnosis Date    Allergy     Anemia     Anxiety     Asthma     Back pain     Chronic bronchitis     Chronic obstructive pulmonary disease, unspecified COPD type 2022    Cigarette smoker     DDD (degenerative disc disease), lumbar 2020    Depression     Diabetes mellitus with peripheral circulatory disorder 2022    Diabetes with neurologic complications     DUB (dysfunctional uterine bleeding) 10/16/2018    GERD (gastroesophageal reflux disease)     High cholesterol     Hyperprolactinemia     Hypertension     Influenza A 2020    Neuromuscular disorder     Obese body habitus     Obesity     Pseudotumor cerebri     Renal manifestation of secondary diabetes mellitus      Respiratory failure     Seizures     Simple endometrial hyperplasia 2018    Sleep apnea     Smoker     Tobacco dependence     Urinary incontinence        Past Surgical History  Past Surgical History:   Procedure Laterality Date    ANTROSTOMY OF MAXILLARY SINUS AT INFERIOR NASAL MEATUS  10/22/2021    Procedure: MAXILLARY ANTROSTOMY, AT INFERIOR NASAL MEATUS;  Surgeon: John Prado III, MD;  Location: [facility] OR;  Service: ENT;;    BREAST CYST ASPIRATION       SECTION      X 1    CHOLECYSTECTOMY      COLONOSCOPY      COLONOSCOPY N/A 2019    Procedure: COLONOSCOPY;  Surgeon: Jagruti Sweeney MD;  Location: [facility] (2ND FLR);  Service: Endoscopy;  Laterality: N/A;  BMI is 63/gastroparesis/3 days full liquid diet 1 day clear liquid see telephone encounter by [facility]    EPIDURAL STEROID INJECTION N/A 2020    Procedure: INJECTION, STEROID, EPIDURAL, L5-S1 IL;  Surgeon: Lawrence Marin MD;  Location: [facility] PAIN MGT;  Service: Pain Management;  Laterality: N/A;    ESOPHAGEAL MANOMETRY WITH MEASUREMENT OF IMPEDANCE N/A 2019    Procedure: MANOMETRY-ESOPHAGEAL-WITH IMPEDANCE;  Surgeon: Jagruti Sweeney MD;  Location: [facility] ([address] [place] [address]);  Service: Endoscopy;  Laterality: N/A;  Hold Narcotics x 1 days   Hold TCA x 1 days  Propofol only. No fentanyl or benzodiazepine during sedation. If additional sedation needed, discuss with Dr. Sweeney.  10/29 - pt confirmed appt    ESOPHAGOGASTRODUODENOSCOPY N/A 2019    Procedure: EGD (ESOPHAGOGASTRODUODENOSCOPY);  Surgeon: Jagruti Sweeney MD;  Location: [facility] (2ND FLR);  Service: Endoscopy;  Laterality: N/A;  BMI is 63/gastroparesis/3 days full liquid diet 1 day clear liquid see telephone encounter by [facility]    ESOPHAGOGASTRODUODENOSCOPY N/A 2019    Procedure: ESOPHAGOGASTRODUODENOSCOPY (EGD);  Surgeon: Jagruti Sweeney MD;  Location: [facility] (2ND FLR);  Service: Endoscopy;  Laterality: N/A;  EGD with EndoFlip /+/-  Botox  2nd floor for gastroparesis/BMI 63 **367lbs**  Bariatric Stretcher needed  Manometry probe to be placed endoscopically during EGD procedure  Due to change in protocol, Full liquid diet for 3 days/ 1 day of clear liquids  Hi    ESOPHAGOGASTRODUODENOSCOPY N/A 12/14/2020    Procedure: ESOPHAGOGASTRODUODENOSCOPY (EGD) with BRAVO;  Surgeon: Jagruti Sweeney MD;  Location: [facility] ENDO (2ND FLR);  Service: Endoscopy;  Laterality: N/A;  with Dilation  2nd floor due to BMI 56.20 (327 lbs) and gastroparesis  3 days full liquid diet and 1 day clears    ESOPHAGOGASTRODUODENOSCOPY N/A 04/15/2021    Procedure: ESOPHAGOGASTRODUODENOSCOPY (EGD);  Surgeon: Jagruti Sweeney MD;  Location: [facility] ENDO (2ND FLR);  Service: Endoscopy;  Laterality: N/A;  Endoflip  2nd floor-gastroparesis, BMI 57.18, bariatric stretcher  full liquid diet x3 days, clear liquid diet x1 day prior to procedure  propofol only  covid test 4/12 primary care, instructions emailed-[facility]    FOOT FRACTURE SURGERY Left     FUNCTIONAL ENDOSCOPIC SINUS SURGERY (FESS) USING COMPUTER-ASSISTED NAVIGATION Bilateral 10/22/2021    Procedure: FESS, USING COMPUTER-ASSISTED NAVIGATION;  Surgeon: John Prado III, MD;  Location: [facility] OR;  Service: ENT;  Laterality: Bilateral;  FOLLOW DR PRADO ANESTHESIA PROTOCAL / pt will stay 23 hour post surgery for SHARATH observation    HYSTEROSCOPY WITH DILATION AND CURETTAGE OF UTERUS N/A 10/16/2018    KJB    INJECTION OF ANESTHETIC AGENT AROUND NERVE Bilateral 10/23/2020    Procedure: BLOCK, NERVE  bilateral L3,4,5 MBB;  Surgeon: Lawrence Marin MD;  Location: [facility] PAIN MGT;  Service: Pain Management;  Laterality: Bilateral;  bilateral L3,4,5 MBB   consent needed    INJECTION OF ANESTHETIC AGENT AROUND NERVE Bilateral 02/18/2022    Procedure: BLOCK, NERVE, L3-L4-L5 MEDIAL BRANCH;  Surgeon: Lawrence Marin MD;  Location: [facility] PAIN MGT;  Service: Pain Management;  Laterality: Bilateral;    PLANTAR FASCIOTOMY Left 11/18/2019     Procedure: FASCIOTOMY, PLANTAR;  Surgeon: Valentino Orourke DPM;  [address];  Service: Podiatry;  Laterality: Left;    RETINAL LASER PROCEDURE Left     SINUS SURGERY      SPHENOIDECTOMY Bilateral 10/22/2021    Procedure: SPHENOIDECTOMY;  Surgeon: John Prado III, MD;  Location: [facility] OR;  Service: ENT;  Laterality: Bilateral;    TRANSFORAMINAL EPIDURAL INJECTION OF STEROID Bilateral 06/24/2020    Procedure: INJECTION, STEROID, EPIDURAL, TRANSFORAMINAL APPROACH;  Surgeon: Lawrence Marin MD;  Location: [facility] PAIN MGT;  Service: Pain Management;  Laterality: Bilateral;    TRANSFORAMINAL EPIDURAL INJECTION OF STEROID Bilateral 01/28/2022    Procedure: INJECTION, STEROID, EPIDURAL, TRANSFORAMINAL APPROACH  Bilateral TFESI L4/L5      NEEDS CONSENT;  Surgeon: Lawrence Marin MD;  Location: [facility] PAIN MGT;  Service: Pain Management;  Laterality: Bilateral;    TRANSFORAMINAL EPIDURAL INJECTION OF STEROID Bilateral 12/23/2022    Procedure: INJECTION, STEROID, EPIDURAL, TRANSFORAMINAL APPROACH BILATERAL L4/L5 CONTRAST  NEEDS CONSENT;  Surgeon: Lawrence Marin MD;  Location: [facility] PAIN MGT;  Service: Pain Management;  Laterality: Bilateral;    UPPER GASTROINTESTINAL ENDOSCOPY         Family History  Family History   Problem Relation Age of Onset    Hypertension Mother     Diabetes Mother     Colon cancer Mother 50    Colon polyps Mother     Cataracts Mother     Heart disease Father     COPD Father     Heart attack Father     Hypertension Father     Ulcers Father     Cataracts Father     Glaucoma Father     No Known Problems Brother     Diabetes Daughter         prediabetes    Diabetes Daughter         prediabetic    Hypertension Daughter     Obesity Daughter     No Known Problems Daughter     No Known Problems Son     No Known Problems Maternal Aunt     No Known Problems Maternal Uncle     No Known Problems Paternal Aunt     No Known Problems Paternal Uncle     Cancer Maternal Grandmother     Breast cancer  "Maternal Grandmother     Colon cancer Maternal Grandmother     Colon polyps Maternal Grandmother     No Known Problems Maternal Grandfather     No Known Problems Paternal Grandmother     No Known Problems Paternal Grandfather     Celiac disease Neg Hx     Cirrhosis Neg Hx     Crohn's disease Neg Hx     Cystic fibrosis Neg Hx     Esophageal cancer Neg Hx     Hemochromatosis Neg Hx     Inflammatory bowel disease Neg Hx     Irritable bowel syndrome Neg Hx     Liver cancer Neg Hx     Liver disease Neg Hx     Rectal cancer Neg Hx     Stomach cancer Neg Hx     Ulcerative colitis Neg Hx     Jonnie's disease Neg Hx     Tuberculosis Neg Hx     Lymphoma Neg Hx     Scleroderma Neg Hx     Rheum arthritis Neg Hx     Melanoma Neg Hx     Multiple sclerosis Neg Hx     Psoriasis Neg Hx     Lupus Neg Hx     Skin cancer Neg Hx     Amblyopia Neg Hx     Blindness Neg Hx     Macular degeneration Neg Hx     Retinal detachment Neg Hx     Strabismus Neg Hx     Stroke Neg Hx     Thyroid disease Neg Hx     Allergic rhinitis Neg Hx     Allergies Neg Hx     Angioedema Neg Hx     Asthma Neg Hx     Eczema Neg Hx     Immunodeficiency Neg Hx     Rhinitis Neg Hx     Urticaria Neg Hx     Atopy Neg Hx        Social History  Social History     Socioeconomic History    Marital status:     Number of children: 4   Occupational History    Occupation: disable   Tobacco Use    Smoking status: Every Day     Packs/day: 0.25     Years: 27.00     Pack years: 6.75     Types: Cigarettes     Start date: 1/1/1992     Passive exposure: Current    Smokeless tobacco: Never    Tobacco comments:     "1/2 pack per every 2 days or so"           10/21/22 patient says its less than half a pack now. Has been cutting back.   Substance and Sexual Activity    Alcohol use: Yes     Alcohol/week: 1.0 standard drink     Types: 1 Shots of liquor per week     Comment: occasionally    Drug use: No    Sexual activity: Yes     Partners: Male     Birth control/protection: None     " Comment:      Social Determinants of Health     Financial Resource Strain: High Risk    Difficulty of Paying Living Expenses: Hard   Food Insecurity: Food Insecurity Present    Worried About Running Out of Food in the Last Year: Sometimes true    Ran Out of Food in the Last Year: Sometimes true   Transportation Needs: Unmet Transportation Needs    Lack of Transportation (Medical): Yes    Lack of Transportation (Non-Medical): Yes   Physical Activity: Inactive    Days of Exercise per Week: 0 days    Minutes of Exercise per Session: 0 min   Stress: Stress Concern Present    Feeling of Stress : To some extent   Social Connections: Unknown    Frequency of Communication with Friends and Family: Three times a week    Frequency of Social Gatherings with Friends and Family: Twice a week    Active Member of Clubs or Organizations: Yes    Attends Club or Organization Meetings: More than 4 times per year    Marital Status:    Housing Stability: High Risk    Unable to Pay for Housing in the Last Year: Yes    Number of Places Lived in the Last Year: 1    Unstable Housing in the Last Year: No       Current Medications  Current Outpatient Medications on File Prior to Visit   Medication Sig Dispense Refill    albuterol (PROVENTIL/VENTOLIN HFA) 90 mcg/actuation inhaler Inhale 2 puffs into the lungs every 6 (six) hours as needed for Wheezing or Shortness of Breath. Rescue 54 g 6    albuterol-ipratropium (DUO-NEB) 2.5 mg-0.5 mg/3 mL nebulizer solution Take 3 mLs by nebulization every 6 (six) hours as needed for Wheezing. Rescue 75 mL 2    alcohol swabs (BD ALCOHOL SWABS) PadM Apply 1 each topically 2 (two) times a day. 200 each 4    amitriptyline (ELAVIL) 25 MG tablet Take 1 tablet (25 mg total) by mouth nightly as needed for Insomnia. 90 tablet 1    ammonium lactate 12 % Crea Apply twice daily to affected parts both feet as needed. 140 g 11    azelastine (ASTELIN) 137 mcg (0.1 %) nasal spray 2 sprays (274 mcg total) by  Nasal route 2 (two) times daily. 60 mL 11    blood-glucose meter (ACCU-CHEK ELVIA PLUS METER) [facility] [place] TIMES DAILY BEFORE MEALS AND EVERY NIGHT 90 each 1    budesonide-formoterol 160-4.5 mcg (SYMBICORT) 160-4.5 mcg/actuation HFAA INHALE 2 PUFFS INTO THE LUNGS EVERY 12 HOURS 30.6 g 3    cetirizine (ZYRTEC) 10 MG tablet Take 1 tablet (10 mg total) by mouth daily as needed for Allergies (itching). 90 tablet 1    clobetasoL (TEMOVATE) 0.05 % external solution Apply topically 2 (two) times daily. Prn scalp itch or rash 60 mL 1    cyclobenzaprine (FLEXERIL) 10 MG tablet       diclofenac sodium (VOLTAREN) 1 % Gel Apply 2 g topically 3 (three) times daily. Apply to painful joints 300 g 2    empagliflozin (JARDIANCE) 10 mg tablet Take 1 tablet (10 mg total) by mouth once daily. 90 tablet 1    fluticasone propionate (FLONASE) 50 mcg/actuation nasal spray 2 sprays (100 mcg total) by Each Nostril route 2 (two) times a day. 32 g 11    lancets (ACCU-CHEK FASTCLIX LANCET DRUM) Misc 1 Device by Misc.(Non-Drug; Combo Route) route 2 (two) times a day. 200 each 4    lancets (ACCU-CHEK SOFTCLIX LANCETS) Misc 1 Device by Misc.(Non-Drug; Combo Route) route 4 (four) times daily. 200 each 3    levocetirizine (XYZAL) 5 MG tablet Take 1 tablet (5 mg total) by mouth every evening. 90 tablet 1    lidocaine (LIDODERM) 5 % Place 1 patch onto the skin once daily. Remove & Discard patch within 12 hours or as directed by MD 15 patch 0    LIDOcaine HCL 2% (XYLOCAINE) 2 % jelly Apply topically as needed. Apply topically once nightly to affected part of foot/feet. 30 mL 2    LINZESS 145 mcg Cap capsule Take 1 capsule (145 mcg total) by mouth once daily. 90 capsule 1    losartan-hydrochlorothiazide 100-25 mg (HYZAAR) 100-25 mg per tablet Take 1 tablet by mouth once daily. 90 tablet 3    methylPREDNISolone (MEDROL DOSEPACK) 4 mg tablet use as directed 21 tablet 0    montelukast (SINGULAIR) 10 mg tablet Take 1 tablet (10 mg total) by mouth every  evening. 90 tablet 1    nicotine (NICODERM CQ) 21 mg/24 hr Place 1 patch onto the skin once daily. 28 patch 0    ondansetron (ZOFRAN) 8 MG tablet Take 1 tablet (8 mg total) by mouth every 8 (eight) hours as needed for Nausea. 90 tablet 11    oxyCODONE-acetaminophen (PERCOCET) 7.5-325 mg per tablet Take 1 tablet by mouth 3 (three) times daily as needed for Pain. 90 tablet 0    pantoprazole (PROTONIX) 40 MG tablet Take 1 tablet (40 mg total) by mouth 2 (two) times daily. 180 tablet 1    pregabalin (LYRICA) 50 MG capsule Take 1 capsule (50 mg total) by mouth 3 (three) times daily. 90 capsule 3    QUEtiapine (SEROQUEL) 25 MG Tab Take 1 tablet (25 mg total) by mouth once daily. 90 tablet 1    rimegepant (NURTEC) 75 mg odt Take 1 tablet (75 mg total) by mouth once as needed for Migraine. Place ODT tablet on the tongue; alternatively the ODT tablet may be placed under the tongue 12 tablet 2    sucralfate (CARAFATE) 100 mg/mL suspension Take 1 g by mouth 3 (three) times daily.      sulindac (CLINORIL) 200 MG Tab Take 1 tablet (200 mg total) by mouth 2 (two) times daily. 30 tablet 0    topiramate (TOPAMAX) 100 MG tablet Take 1.5 tablets (150 mg total) by mouth every evening. 180 tablet 1    triamcinolone acetonide 0.1% (KENALOG) 0.1 % cream Apply topically 2 (two) times daily. Prn focally for rash spots on forehead and legs.Stop using steroid topical when skin is smooth and non itchy.  Do not treat dark or red coloring. 45 g 0    venlafaxine (EFFEXOR-XR) 75 MG 24 hr capsule Take 1 capsule (75 mg total) by mouth once daily. 90 capsule 1    blood sugar diagnostic Strp 1 each by Misc.(Non-Drug; Combo Route) route 4 (four) times daily before meals and nightly. ACCU CHEK ELVIA PLUS METER 200 each 3    EPINEPHrine (EPIPEN) 0.3 mg/0.3 mL AtIn Inject 0.3 mLs (0.3 mg total) into the muscle once. for 1 dose (Patient not taking: Reported on 2/3/2023) 2 each 1    nystatin-triamcinolone (MYCOLOG) ointment Apply topically 2 (two) times  "daily. (Patient not taking: Reported on 2/3/2023) 60 g 2    [DISCONTINUED] semaglutide 2 mg/dose (8 mg/3 mL) PnIj Inject 2 mg into the skin every 7 days. (Patient not taking: Reported on 2/3/2023) 9 mL 0     Current Facility-Administered Medications on File Prior to Visit   Medication Dose Route Frequency Provider Last Rate Last Admin    albuterol inhaler 2 puff  2 puff Inhalation Q20 Min PRN Carlyle Gordillo MD        EPINEPHrine (EPIPEN) 0.3 mg/0.3 mL pen injection 0.3 mg  0.3 mg Intramuscular PRN Carlyle Gordillo MD           Allergies   Review of patient's allergies indicates:   Allergen Reactions    Gabapentin Other (See Comments)     Makes her twitch       Review of Systems (Pertinent positives)  Review of Systems   Constitutional: Negative.  Negative for chills and fever.   HENT: Negative.     Eyes:  Negative for blurred vision, double vision, photophobia, pain, discharge and redness.   Cardiovascular:  Negative for chest pain, palpitations and orthopnea.   Gastrointestinal:  Positive for nausea. Negative for abdominal pain, diarrhea, heartburn and vomiting.   Genitourinary:  Positive for frequency. Negative for dysuria, flank pain and hematuria.   Musculoskeletal: Negative.    Skin: Negative.    Neurological:  Negative for dizziness, tingling, seizures, loss of consciousness and headaches.   Endo/Heme/Allergies: Negative.    Psychiatric/Behavioral: Negative.       /74 (BP Location: Left arm, Patient Position: Sitting, BP Method: X-Large (Manual))   Pulse 92   Temp 98 °F (36.7 °C) (Oral)   Resp 16   Ht 5' 4" (1.626 m)   Wt (!) 156.7 kg (345 lb 7.4 oz)   LMP 08/17/2019   SpO2 96%   BMI 59.30 kg/m²     Physical Exam  Vitals reviewed.   Constitutional:       General: She is not in acute distress.     Appearance: Normal appearance. She is obese. She is not ill-appearing, toxic-appearing or diaphoretic.   HENT:      Head: Normocephalic and atraumatic.   Cardiovascular:      Rate and Rhythm: Normal " rate and regular rhythm.      Pulses: Normal pulses.      Heart sounds: Normal heart sounds.   Pulmonary:      Effort: Pulmonary effort is normal. No respiratory distress.      Breath sounds: Normal breath sounds. No wheezing.   Abdominal:      General: Bowel sounds are normal. There is no distension.      Palpations: Abdomen is soft.      Tenderness: There is no abdominal tenderness.   Musculoskeletal:         General: No swelling, tenderness or deformity. Normal range of motion.   Skin:     General: Skin is warm and dry.      Capillary Refill: Capillary refill takes less than 2 seconds.   Neurological:      General: No focal deficit present.      Mental Status: She is alert and oriented to person, place, and time.   Psychiatric:         Mood and Affect: Mood normal.         Behavior: Behavior normal.        Assessment/Plan:  Yanni Kaiser is a 52 y.o. female who presents today for :    Yanni was seen today for diabetes.    Diagnoses and all orders for this visit:    Type 2 diabetes mellitus with stage 3 chronic kidney disease, with long-term current use of insulin, unspecified whether stage 3a or 3b CKD  -     POCT Glucose, Hand-Held Device  -     Urinalysis; Future  -     CBC W/ AUTO DIFFERENTIAL; Future  -     COMPREHENSIVE METABOLIC PANEL; Future  -     BETA - HYDROXYBUTYRATE, SERUM; Future  -     glipiZIDE (GLUCOTROL) 2.5 MG TR24; Take 1 tablet (2.5 mg total) by mouth daily with breakfast.  -     semaglutide (OZEMPIC) 1 mg/dose (4 mg/3 mL); Inject 1 mg into the skin every 7 days.    Point of care glucose in clinic today 330.  No clinical signs of dehydration all patient does have dry mouth and urinary frequency.  Check labs today.  Recommended patient to stop Jardiance as this can worsen her dehydration.  Start glipizide today.  Ozempic sent at 1 mg dose.  If tolerated will workup back to 2 mg dose.  Elevated blood sugars likely due to recent steroid use.  Will follow-up with labs.  Patient will  likely need in-person follow-up in the next 1-2 weeks.  Recommended pt to not take 's insulin dose anymore.  Recommended patient to go to the emergency room if she develops any chest pain, lethargy, tremors, abdominal pain, or other acutely concerning signs or symptoms.  All questions answered.        This office note has been dictated.  This dictation has been generated using M-Modal Fluency Direct dictation; some phonetic errors may occur.   My collaborating physician is Dr. Jose Rausch.

## 2023-02-09 NOTE — Clinical Note
Diagnosis: DUSTIN (acute kidney injury) [[id number]]   Future Attending Provider: LISA SCHRADER III [[id number]]   Admitting Provider:: LISA SCHRADER III [[id number]]

## 2023-02-09 NOTE — LETTER
Patient: Yanni Kaiser  YOB: 1970  Date: 2/9/2023 Time: 10:22 PM  Location: [facility]    Leaving the Hospital Against Medical Advice    Chart #:[id number]    This will certify that I, the undersigned,    ______________________________________________________________________    A patient in the above named medical center, having requested discharge and removal from the medical center against the advice of my attending physician(s), hereby release [facility], its physicians, officers and employees, severally and individually, from any and all liability of any nature whatsoever for any injury or harm or complication of any kind that may result directly or indirectly, by reason of my terminating my stay as a patient at [facility] and my departure from [facility], and hereby waive any and all rights of action I may now have or later acquire as a result of my voluntary departure from [facility] and the termination of my stay as a patient therein.    This release is made with the full knowledge of the danger that may result from the action which I am taking.      Date:_______________________                         ___________________________                                                                                    Patient/Legal Representative    Witness:        ____________________________                          ___________________________  Nurse                                                                        Physician

## 2023-02-09 NOTE — ED TRIAGE NOTES
Patient arrived to the ER via personal transport w [facility]: sent by MD for abnormal labs (Pt was seen by her MD this morning for elevated blood sugar nad sent for evaluation after lab work completed. )

## 2023-02-09 NOTE — TELEPHONE ENCOUNTER
Patient called and ID verified by name and date of birth.  Notified patient of labs showing dehydration.  She states that everything she drinks comes right out I asked her to check her blood sugar on her device at home and she states it was 480 during our phone call.  Recommended ED evaluation.  Patient is agreeable to this

## 2023-02-09 NOTE — FIRST PROVIDER EVALUATION
"Medical screening examination initiated.  I have conducted a focused provider triage encounter, findings are as follows:    Brief history of present illness:  52-year-old female history of diabetes presenting to the emergency department with a chief complaint of abnormal labs.  She was admitted to the hospital last month for pneumonia and was discharged with a course of steroids.  Steroids have caused her blood sugar to be high but she has not had a working glucometer at home.  When she finally got her glucometer yesterday, she took her blood sugar at home and it was high this morning her blood sugar was 484, went to her PCP and her blood sugar was again above normal range of the meter.  She then presented to the emergency department.  She is reporting generalized malaise and fatigue, polyuria, and midsternal chest pain.    Vitals:    02/09/23 1333   BP: (!) 143/88   BP Location: Left arm   Patient Position: Sitting   Pulse: 104   Resp: 18   Temp: 98.2 °F (36.8 °C)   TempSrc: Oral   SpO2: 98%   Weight: (!) 158 kg (348 lb 5.2 oz)   Height: 5' 4" (1.626 m)       Pertinent physical exam:  Patient awake, alert, speaking in full sentences.  Abdomen soft, nontender.  Cardiac and lung exams within normal limits.    Brief workup plan:  CBC, CMP, blood gas, urinalysis, chest x-ray, fluids, EKG, troponin, BNP beta hydroxybutyrate.    Preliminary workup initiated; this workup will be continued and followed by the physician or advanced practice provider that is assigned to the patient when roomed.  "

## 2023-02-10 NOTE — ASSESSMENT & PLAN NOTE
Patient with acute kidney injury likely due to IVVD/dehydration DUSTIN is currently worsening. Labs reviewed- Renal function/electrolytes with Estimated Creatinine Clearance: 51 mL/min (A) (based on SCr of 2 mg/dL (H)). according to latest data. Monitor urine output and serial BMP and adjust therapy as needed. Avoid nephrotoxins and renally dose meds for GFR listed above.

## 2023-02-10 NOTE — ASSESSMENT & PLAN NOTE
Patient's FSGs are uncontrolled due to hyperglycemia on current medication regimen.  Last A1c reviewed-   Lab Results   Component Value Date    HGBA1C 5.8 (H) 12/20/2022     Most recent fingerstick glucose reviewed-   Recent Labs   Lab 02/09/23  1334 02/09/23  1645   POCTGLUCOSE >500* 369*     Current correctional scale  Medium  Maintain anti-hyperglycemic dose as follows-   Antihyperglycemics (From admission, onward)    Start     Stop Route Frequency Ordered    02/09/23 2100  insulin detemir U-100 pen 15 Units         -- SubQ Nightly 02/09/23 1703    02/09/23 1802  insulin aspart U-100 pen 1-10 Units         -- SubQ Before meals & nightly PRN 02/09/23 1703        Hold Oral hypoglycemics while patient is in the hospital.

## 2023-02-10 NOTE — DISCHARGE SUMMARY
[facility] - Emergency Dept  Discharge Note  Short Stay    * No surgery found *      OUTCOME: Patient leaving AMA in spite multiple attempts by myself and staff to convince the patient to stay for evaluation and treatment. The patient has the capacity to give, receive, and withhold information. The patient verbalizes understanding of their condition and symptoms and that this represents a significant threat. The patient has verbalized to me that they understand the plan of diagnosis and treatment, and unfortunately will not agree and understand that it may cause worsening of their condition or even death. The patient understands that they can come back at any time for further care without prejudice and we will be happy to provide treatment again.     DISPOSITION: Left Against Medical Advice    FINAL DIAGNOSIS:  DUSTIN (acute kidney injury)    FOLLOWUP: With primary care provider    DISCHARGE INSTRUCTIONS:  No discharge procedures on file.     TIME SPENT ON DISCHARGE: 20 minutes

## 2023-02-10 NOTE — HPI
52 y.o. female with DM2, HTN, HLD, and depression presents to ED for hyperglycemia on outpatient lab work.  Had surgery roughly 1 month ago and not all of her diabetes meds were restarted.  Reports polyuria.  Denies fever, chills, cough, SOB, chest pain, dizziness, syncope, n/v/d, or abdominal pain.  In the ED, glucose >500, labs show DUSTIN. Given IV fluids and insulin.  Placed in observation.

## 2023-02-10 NOTE — SUBJECTIVE & OBJECTIVE
Past Medical History:   Diagnosis Date    Allergy     Anemia     Anxiety     Asthma     Back pain     Chronic bronchitis     Chronic obstructive pulmonary disease, unspecified COPD type 2022    Cigarette smoker     DDD (degenerative disc disease), lumbar 2020    Depression     Diabetes mellitus with peripheral circulatory disorder 2022    Diabetes with neurologic complications     DUB (dysfunctional uterine bleeding) 10/16/2018    GERD (gastroesophageal reflux disease)     High cholesterol     Hyperprolactinemia     Hypertension     Influenza A 2020    Neuromuscular disorder     Obese body habitus     Obesity     Pseudotumor cerebri     Renal manifestation of secondary diabetes mellitus     Respiratory failure     Seizures     Simple endometrial hyperplasia     Sleep apnea     Smoker     Tobacco dependence     Urinary incontinence        Past Surgical History:   Procedure Laterality Date    ANTROSTOMY OF MAXILLARY SINUS AT INFERIOR NASAL MEATUS  10/22/2021    Procedure: MAXILLARY ANTROSTOMY, AT INFERIOR NASAL MEATUS;  Surgeon: John Prado III, MD;  Location: [facility] OR;  Service: ENT;;    BREAST CYST ASPIRATION       SECTION      X 1    CHOLECYSTECTOMY      COLONOSCOPY      COLONOSCOPY N/A 2019    Procedure: COLONOSCOPY;  Surgeon: Jagruti Sweeney MD;  Location: [facility] ([address] [place] [address]);  Service: Endoscopy;  Laterality: N/A;  BMI is 63/gastroparesis/3 days full liquid diet 1 day clear liquid see telephone encounter by Ciara Brown     EPIDURAL STEROID INJECTION N/A 2020    Procedure: INJECTION, STEROID, EPIDURAL, L5-S1 IL;  Surgeon: Lawrence Marin MD;  Location: [facility] PAIN MGT;  Service: Pain Management;  Laterality: N/A;    ESOPHAGEAL MANOMETRY WITH MEASUREMENT OF IMPEDANCE N/A 2019    Procedure: MANOMETRY-ESOPHAGEAL-WITH IMPEDANCE;  Surgeon: Jagruti Sweeney MD;  [facility]  Service: Endoscopy;  Laterality: N/A;  Hold Narcotics x 1 days    Hold TCA x 1 days  Propofol only. No fentanyl or benzodiazepine during sedation. If additional sedation needed, discuss with Dr. Sweeney.  10/29 - pt confirmed appt    ESOPHAGOGASTRODUODENOSCOPY N/A 01/14/2019    Procedure: EGD (ESOPHAGOGASTRODUODENOSCOPY);  Surgeon: Jagruti Sweeney MD;  Location: [facility] (2ND FLR);  Service: Endoscopy;  Laterality: N/A;  BMI is 63/gastroparesis/3 days full liquid diet 1 day clear liquid see telephone encounter by Ciara Brown     ESOPHAGOGASTRODUODENOSCOPY N/A 11/05/2019    Procedure: ESOPHAGOGASTRODUODENOSCOPY (EGD);  Surgeon: Jagruti Sweeney MD;  Location: [facility] (2ND FLR);  Service: Endoscopy;  Laterality: N/A;  EGD with EndoFlip /+/- Botox  2nd floor for gastroparesis/BMI 63 **367lbs**  Bariatric Stretcher needed  Manometry probe to be placed endoscopically during EGD procedure  Due to change in protocol, Full liquid diet for 3 days/ 1 day of clear liquids  Hi    ESOPHAGOGASTRODUODENOSCOPY N/A 12/14/2020    Procedure: ESOPHAGOGASTRODUODENOSCOPY (EGD) with BRAVO;  Surgeon: Jagruti Sweeney MD;  Location: [facility] (2ND FLR);  Service: Endoscopy;  Laterality: N/A;  with Dilation  2nd floor due to BMI 56.20 (327 lbs) and gastroparesis  3 days full liquid diet and 1 day clears    ESOPHAGOGASTRODUODENOSCOPY N/A 04/15/2021    Procedure: ESOPHAGOGASTRODUODENOSCOPY (EGD);  Surgeon: Jagruti Sweeney MD;  Location: [facility] ENDO (2ND FLR);  Service: Endoscopy;  Laterality: N/A;  Endoflip  2nd floor-gastroparesis, BMI 57.18, bariatric stretcher  full liquid diet x3 days, clear liquid diet x1 day prior to procedure  propofol only  covid test 4/12 primary care, instructions emailed-[facility]    FOOT FRACTURE SURGERY Left     FUNCTIONAL ENDOSCOPIC SINUS SURGERY (FESS) USING COMPUTER-ASSISTED NAVIGATION Bilateral 10/22/2021    Procedure: FESS, USING COMPUTER-ASSISTED NAVIGATION;  Surgeon: John Prado III, MD;  Location: [facility] OR;  Service: ENT;  Laterality: Bilateral;  FOLLOW   JASON MCNULTY PROTOCAL / pt will stay 23 hour post surgery for SHARATH observation    HYSTEROSCOPY WITH DILATION AND CURETTAGE OF UTERUS N/A 10/16/2018    KJB    INJECTION OF ANESTHETIC AGENT AROUND NERVE Bilateral 10/23/2020    Procedure: BLOCK, NERVE  bilateral L3,4,5 MBB;  Surgeon: Lawrence Marin MD;  Location: [facility] PAIN MGT;  Service: Pain Management;  Laterality: Bilateral;  bilateral L3,4,5 MBB   consent needed    INJECTION OF ANESTHETIC AGENT AROUND NERVE Bilateral 02/18/2022    Procedure: BLOCK, NERVE, L3-L4-L5 MEDIAL BRANCH;  Surgeon: Lawrence Marin MD;  Location: [facility] PAIN MGT;  Service: Pain Management;  Laterality: Bilateral;    PLANTAR FASCIOTOMY Left 11/18/2019    Procedure: FASCIOTOMY, PLANTAR;  Surgeon: Valentino Orourke DPM;  Location: [address];  Service: Podiatry;  Laterality: Left;    RETINAL LASER PROCEDURE Left     SINUS SURGERY      SPHENOIDECTOMY Bilateral 10/22/2021    Procedure: SPHENOIDECTOMY;  Surgeon: John Prado III, MD;  Location: [facility];  Service: ENT;  Laterality: Bilateral;    TRANSFORAMINAL EPIDURAL INJECTION OF STEROID Bilateral 06/24/2020    Procedure: INJECTION, STEROID, EPIDURAL, TRANSFORAMINAL APPROACH;  Surgeon: Lawrence Marin MD;  Location: [facility] PAIN MGT;  Service: Pain Management;  Laterality: Bilateral;    TRANSFORAMINAL EPIDURAL INJECTION OF STEROID Bilateral 01/28/2022    Procedure: INJECTION, STEROID, EPIDURAL, TRANSFORAMINAL APPROACH  Bilateral TFESI L4/L5      NEEDS CONSENT;  Surgeon: Lawrence Marin MD;  Location: [facility] PAIN MGT;  Service: Pain Management;  Laterality: Bilateral;    TRANSFORAMINAL EPIDURAL INJECTION OF STEROID Bilateral 12/23/2022    Procedure: INJECTION, STEROID, EPIDURAL, TRANSFORAMINAL APPROACH BILATERAL L4/L5 CONTRAST  NEEDS CONSENT;  Surgeon: Lawrence Marin MD;  Location: [facility] PAIN MGT;  Service: Pain Management;  Laterality: Bilateral;    UPPER GASTROINTESTINAL ENDOSCOPY         Review of patient's allergies indicates:    Allergen Reactions    Gabapentin Other (See Comments)     Makes her twitch       Current Facility-Administered Medications on File Prior to Encounter   Medication    albuterol inhaler 2 puff    EPINEPHrine (EPIPEN) 0.3 mg/0.3 mL pen injection 0.3 mg     Current Outpatient Medications on File Prior to Encounter   Medication Sig    albuterol (PROVENTIL/VENTOLIN HFA) 90 mcg/actuation inhaler Inhale 2 puffs into the lungs every 6 (six) hours as needed for Wheezing or Shortness of Breath. Rescue    albuterol-ipratropium (DUO-NEB) 2.5 mg-0.5 mg/3 mL nebulizer solution Take 3 mLs by nebulization every 6 (six) hours as needed for Wheezing. Rescue    amitriptyline (ELAVIL) 25 MG tablet Take 1 tablet (25 mg total) by mouth nightly as needed for Insomnia.    ammonium lactate 12 % Crea Apply twice daily to affected parts both feet as needed.    azelastine (ASTELIN) 137 mcg (0.1 %) nasal spray 2 sprays (274 mcg total) by Nasal route 2 (two) times daily.    budesonide-formoterol 160-4.5 mcg (SYMBICORT) 160-4.5 mcg/actuation HFAA INHALE 2 PUFFS INTO THE LUNGS EVERY 12 HOURS    cetirizine (ZYRTEC) 10 MG tablet Take 1 tablet (10 mg total) by mouth daily as needed for Allergies (itching).    clobetasoL (TEMOVATE) 0.05 % external solution Apply topically 2 (two) times daily. Prn scalp itch or rash    cyclobenzaprine (FLEXERIL) 10 MG tablet 10 mg 3 (three) times daily as needed.    diclofenac sodium (VOLTAREN) 1 % Gel Apply 2 g topically 3 (three) times daily. Apply to painful joints    empagliflozin (JARDIANCE) 10 mg tablet Take 1 tablet (10 mg total) by mouth once daily.    EPINEPHrine (EPIPEN) 0.3 mg/0.3 mL AtIn Inject 0.3 mLs (0.3 mg total) into the muscle once. for 1 dose (Patient not taking: Reported on 2/3/2023)    fluticasone propionate (FLONASE) 50 mcg/actuation nasal spray 2 sprays (100 mcg total) by Each Nostril route 2 (two) times a day.    glipiZIDE (GLUCOTROL) 2.5 MG TR24 Take 1 tablet (2.5 mg total) by mouth daily with  breakfast.    levocetirizine (XYZAL) 5 MG tablet Take 1 tablet (5 mg total) by mouth every evening.    LIDOcaine HCL 2% (XYLOCAINE) 2 % jelly Apply topically as needed. Apply topically once nightly to affected part of foot/feet.    LINZESS 145 mcg Cap capsule Take 1 capsule (145 mcg total) by mouth once daily.    losartan-hydrochlorothiazide 100-25 mg (HYZAAR) 100-25 mg per tablet Take 1 tablet by mouth once daily.    montelukast (SINGULAIR) 10 mg tablet Take 1 tablet (10 mg total) by mouth every evening.    nicotine (NICODERM CQ) 21 mg/24 hr Place 1 patch onto the skin once daily.    nystatin-triamcinolone (MYCOLOG) ointment Apply topically 2 (two) times daily. (Patient not taking: Reported on 2/3/2023)    ondansetron (ZOFRAN) 8 MG tablet Take 1 tablet (8 mg total) by mouth every 8 (eight) hours as needed for Nausea.    oxyCODONE-acetaminophen (PERCOCET) 7.5-325 mg per tablet Take 1 tablet by mouth 3 (three) times daily as needed for Pain.    pantoprazole (PROTONIX) 40 MG tablet Take 1 tablet (40 mg total) by mouth 2 (two) times daily.    pregabalin (LYRICA) 50 MG capsule Take 1 capsule (50 mg total) by mouth 3 (three) times daily.    QUEtiapine (SEROQUEL) 25 MG Tab Take 1 tablet (25 mg total) by mouth once daily.    rimegepant (NURTEC) 75 mg odt Take 1 tablet (75 mg total) by mouth once as needed for Migraine. Place ODT tablet on the tongue; alternatively the ODT tablet may be placed under the tongue    semaglutide (OZEMPIC) 1 mg/dose (4 mg/3 mL) Inject 1 mg into the skin every 7 days.    sucralfate (CARAFATE) 100 mg/mL suspension Take 1 g by mouth 3 (three) times daily.    sulindac (CLINORIL) 200 MG Tab Take 1 tablet (200 mg total) by mouth 2 (two) times daily.    topiramate (TOPAMAX) 100 MG tablet Take 1.5 tablets (150 mg total) by mouth every evening.    triamcinolone acetonide 0.1% (KENALOG) 0.1 % cream Apply topically 2 (two) times daily. Prn focally for rash spots on forehead and legs.Stop using steroid  topical when skin is smooth and non itchy.  Do not treat dark or red coloring.    venlafaxine (EFFEXOR-XR) 75 MG 24 hr capsule Take 1 capsule (75 mg total) by mouth once daily.    [DISCONTINUED] alcohol swabs (BD ALCOHOL SWABS) PadM Apply 1 each topically 2 (two) times a day.    [DISCONTINUED] blood sugar diagnostic Strp 1 each by Misc.(Non-Drug; Combo Route) route 4 (four) times daily before meals and nightly. ACCU CHEK ELVIA PLUS METER    [DISCONTINUED] blood-glucose meter (ACCU-CHEK ELVIA PLUS METER) Misc TEST FOUR TIMES DAILY BEFORE MEALS AND EVERY NIGHT    [DISCONTINUED] lancets (ACCU-CHEK FASTCLIX LANCET DRUM) Misc 1 Device by Misc.(Non-Drug; Combo Route) route 2 (two) times a day.    [DISCONTINUED] lancets (ACCU-CHEK SOFTCLIX LANCETS) Misc 1 Device by Misc.(Non-Drug; Combo Route) route 4 (four) times daily.    [DISCONTINUED] lidocaine (LIDODERM) 5 % Place 1 patch onto the skin once daily. Remove & Discard patch within 12 hours or as directed by MD    [DISCONTINUED] methylPREDNISolone (MEDROL DOSEPACK) 4 mg tablet use as directed    [DISCONTINUED] semaglutide 2 mg/dose (8 mg/3 mL) PnIj Inject 2 mg into the skin every 7 days. (Patient not taking: Reported on 2/3/2023)     Family History       Problem Relation (Age of Onset)    Breast cancer Maternal Grandmother    COPD Father    Cancer Maternal Grandmother    Cataracts Mother, Father    Colon cancer Mother (50), Maternal Grandmother    Colon polyps Mother, Maternal Grandmother    Diabetes Mother, Daughter, Daughter    Glaucoma Father    Heart attack Father    Heart disease Father    Hypertension Mother, Father, Daughter    No Known Problems Brother, Daughter, Son, Maternal Aunt, Maternal Uncle, Paternal Aunt, Paternal Uncle, Maternal Grandfather, Paternal Grandmother, Paternal Grandfather    Obesity Daughter    Ulcers Father          Tobacco Use    Smoking status: Every Day     Packs/day: 0.25     Years: 27.00     Pack years: 6.75     Types: Cigarettes      "Start date: 1/1/1992     Passive exposure: Current    Smokeless tobacco: Never    Tobacco comments:     "1/2 pack per every 2 days or so"           10/21/22 patient says its less than half a pack now. Has been cutting back.   Substance and Sexual Activity    Alcohol use: Yes     Alcohol/week: 1.0 standard drink     Types: 1 Shots of liquor per week     Comment: occasionally    Drug use: No    Sexual activity: Yes     Partners: Male     Birth control/protection: None     Comment:      Review of Systems   Constitutional:  Negative for chills, fatigue and fever.   HENT:  Negative for congestion and rhinorrhea.    Eyes:  Negative for photophobia and visual disturbance.   Respiratory:  Negative for cough and shortness of breath.    Cardiovascular:  Negative for chest pain, palpitations and leg swelling.   Gastrointestinal:  Negative for abdominal pain, diarrhea, nausea and vomiting.   Endocrine: Positive for polyuria.   Genitourinary:  Negative for dysuria, frequency and urgency.   Skin:  Negative for pallor, rash and wound.   Neurological:  Negative for light-headedness and headaches.   Psychiatric/Behavioral:  Negative for confusion and decreased concentration.    Objective:     Vital Signs (Most Recent):  Temp: 98.2 °F (36.8 °C) (02/09/23 1603)  Pulse: 84 (02/09/23 1829)  Resp: (!) 27 (02/09/23 1829)  BP: 137/72 (02/09/23 1603)  SpO2: 96 % (02/09/23 1829)   Vital Signs (24h Range):  Temp:  [98 °F (36.7 °C)-98.2 °F (36.8 °C)] 98.2 °F (36.8 °C)  Pulse:  [] 84  Resp:  [16-27] 27  SpO2:  [95 %-98 %] 96 %  BP: (116-143)/(72-88) 137/72     Weight: (!) 158 kg (348 lb 5.2 oz)  Body mass index is 59.79 kg/m².    Physical Exam  Vitals and nursing note reviewed.   Constitutional:       General: She is not in acute distress.     Appearance: She is well-developed.   HENT:      Head: Normocephalic and atraumatic.      Right Ear: External ear normal.      Left Ear: External ear normal.      Nose: Nose normal.   Eyes:    "   Conjunctiva/sclera: Conjunctivae normal.      Pupils: Pupils are equal, round, and reactive to light.   Cardiovascular:      Rate and Rhythm: Normal rate and regular rhythm.   Pulmonary:      Effort: Pulmonary effort is normal. No respiratory distress.      Breath sounds: Normal breath sounds. No wheezing.   Abdominal:      General: Bowel sounds are normal. There is no distension.      Palpations: Abdomen is soft.      Tenderness: There is no abdominal tenderness.      Comments: No palpable hepatomegaly or splenomegaly    Musculoskeletal:         General: No tenderness. Normal range of motion.      Cervical back: Normal range of motion and neck supple.   Skin:     General: Skin is warm and dry.   Neurological:      Mental Status: She is alert and oriented to person, place, and time.   Psychiatric:         Thought Content: Thought content normal.         CRANIAL NERVES     CN III, IV, VI   Pupils are equal, round, and reactive to light.     Significant Labs: All pertinent labs within the past 24 hours have been reviewed.    Significant Imaging: I have reviewed all pertinent imaging results/findings within the past 24 hours.

## 2023-02-10 NOTE — H&P
[facility] Medicine  History & Physical    Patient Name: Yanni Kaiser  MRN: [id number]  Patient Class: OP- Observation  Admission Date: 2/9/2023  Attending Physician: Robert Cortes III, MD   Primary Care Provider: Carlyle Gordillo MD         Patient information was obtained from patient, past medical records and ER records.     Subjective:     Principal Problem:DUSTIN (acute kidney injury)    Chief Complaint:   Chief Complaint   Patient presents with    sent by MD for abnormal labs     Pt was seen by her MD this morning for elevated blood sugar nad sent for evaluation after lab work completed.         HPI: 52 y.o. female with DM2, HTN, HLD, and depression presents to ED for hyperglycemia on outpatient lab work.  Had surgery roughly 1 month ago and not all of her diabetes meds were restarted.  Reports polyuria.  Denies fever, chills, cough, SOB, chest pain, dizziness, syncope, n/v/d, or abdominal pain.  In the ED, glucose >500, labs show DUSTIN. Given IV fluids and insulin.  Placed in observation.      Past Medical History:   Diagnosis Date    Allergy     Anemia     Anxiety     Asthma     Back pain     Chronic bronchitis     Chronic obstructive pulmonary disease, unspecified COPD type 4/1/2022    Cigarette smoker     DDD (degenerative disc disease), lumbar 6/9/2020    Depression     Diabetes mellitus with peripheral circulatory disorder 4/1/2022    Diabetes with neurologic complications     DUB (dysfunctional uterine bleeding) 10/16/2018    GERD (gastroesophageal reflux disease)     High cholesterol     Hyperprolactinemia     Hypertension     Influenza A 01/16/2020    Neuromuscular disorder     Obese body habitus     Obesity     Pseudotumor cerebri     Renal manifestation of secondary diabetes mellitus     Respiratory failure     Seizures     Simple endometrial hyperplasia 2018    Sleep apnea     Smoker     Tobacco dependence     Urinary incontinence         Past Surgical History:   Procedure Laterality Date    ANTROSTOMY OF MAXILLARY SINUS AT INFERIOR NASAL MEATUS  10/22/2021    Procedure: MAXILLARY ANTROSTOMY, AT INFERIOR NASAL MEATUS;  Surgeon: John Prado III, MD;  Location: [facility] OR;  Service: ENT;;    BREAST CYST ASPIRATION       SECTION      X 1    CHOLECYSTECTOMY      COLONOSCOPY      COLONOSCOPY N/A 2019    Procedure: COLONOSCOPY;  Surgeon: Jagruti Sweeney MD;  Location: [facility] (2ND FLR);  Service: Endoscopy;  Laterality: N/A;  BMI is 63/gastroparesis/3 days full liquid diet 1 day clear liquid see telephone encounter by [facility]    EPIDURAL STEROID INJECTION N/A 2020    Procedure: INJECTION, STEROID, EPIDURAL, L5-S1 IL;  Surgeon: Lawrence Marin MD;  Location: [facility] PAIN MGT;  Service: Pain Management;  Laterality: N/A;    ESOPHAGEAL MANOMETRY WITH MEASUREMENT OF IMPEDANCE N/A 2019    Procedure: MANOMETRY-ESOPHAGEAL-WITH IMPEDANCE;  Surgeon: Jagruti Sweeney MD;  Location: [facility] ([address] [place] [address]);  Service: Endoscopy;  Laterality: N/A;  Hold Narcotics x 1 days   Hold TCA x 1 days  Propofol only. No fentanyl or benzodiazepine during sedation. If additional sedation needed, discuss with Dr. Sweeney.  10/29 - pt confirmed appt    ESOPHAGOGASTRODUODENOSCOPY N/A 2019    Procedure: EGD (ESOPHAGOGASTRODUODENOSCOPY);  Surgeon: Jagruti Sweeney MD;  Location: [facility] ([address] [place] [address]);  Service: Endoscopy;  Laterality: N/A;  BMI is 63/gastroparesis/3 days full liquid diet 1 day clear liquid see telephone encounter by Ciara [facility]     ESOPHAGOGASTRODUODENOSCOPY N/A 2019    Procedure: ESOPHAGOGASTRODUODENOSCOPY (EGD);  Surgeon: Jagruti Sweeney MD;  Location: [facility] (2ND FLR);  Service: Endoscopy;  Laterality: N/A;  EGD with EndoFlip /+/- Botox  2nd floor for gastroparesis/BMI 63 **367lbs**  Bariatric Stretcher needed  Manometry probe to be placed endoscopically during EGD procedure  Due to change in  protocol, Full liquid diet for 3 days/ 1 day of clear liquids  Hi    ESOPHAGOGASTRODUODENOSCOPY N/A 12/14/2020    Procedure: ESOPHAGOGASTRODUODENOSCOPY (EGD) with BRAVO;  Surgeon: Jagruti Sweeney MD;  Location: [facility] ENDO (2ND FLR);  Service: Endoscopy;  Laterality: N/A;  with Dilation  2nd floor due to BMI 56.20 (327 lbs) and gastroparesis  3 days full liquid diet and 1 day clears    ESOPHAGOGASTRODUODENOSCOPY N/A 04/15/2021    Procedure: ESOPHAGOGASTRODUODENOSCOPY (EGD);  Surgeon: Jagruti Sweeney MD;  Location: [facility] ENDO (2ND FLR);  Service: Endoscopy;  Laterality: N/A;  Endoflip  2nd floor-gastroparesis, BMI 57.18, bariatric stretcher  full liquid diet x3 days, clear liquid diet x1 day prior to procedure  propofol only  covid test 4/12 primary care, instructions emailed-[facility]    FOOT FRACTURE SURGERY Left     FUNCTIONAL ENDOSCOPIC SINUS SURGERY (FESS) USING COMPUTER-ASSISTED NAVIGATION Bilateral 10/22/2021    Procedure: FESS, USING COMPUTER-ASSISTED NAVIGATION;  Surgeon: John Prado III, MD;  Location: [facility];  Service: ENT;  Laterality: Bilateral;  FOLLOW DR PRADO ANESTHESIA PROTOCAL / pt will stay 23 hour post surgery for SHARATH observation    HYSTEROSCOPY WITH DILATION AND CURETTAGE OF UTERUS N/A 10/16/2018    KJB    INJECTION OF ANESTHETIC AGENT AROUND NERVE Bilateral 10/23/2020    Procedure: BLOCK, NERVE  bilateral L3,4,5 MBB;  Surgeon: Lawrence Marin MD;  Location: [facility] PAIN MGT;  Service: Pain Management;  Laterality: Bilateral;  bilateral L3,4,5 MBB   consent needed    INJECTION OF ANESTHETIC AGENT AROUND NERVE Bilateral 02/18/2022    Procedure: BLOCK, NERVE, L3-L4-L5 MEDIAL BRANCH;  Surgeon: Lawrence Marin MD;  Location: [facility] PAIN MGT;  Service: Pain Management;  Laterality: Bilateral;    PLANTAR FASCIOTOMY Left 11/18/2019    Procedure: FASCIOTOMY, PLANTAR;  Surgeon: Valentino Orourke DPM;  Location: [facility] 2ND FLR;  Service: Podiatry;  Laterality: Left;    RETINAL LASER PROCEDURE Left      SINUS SURGERY      SPHENOIDECTOMY Bilateral 10/22/2021    Procedure: SPHENOIDECTOMY;  Surgeon: John Prado III, MD;  Location: [facility] OR;  Service: ENT;  Laterality: Bilateral;    TRANSFORAMINAL EPIDURAL INJECTION OF STEROID Bilateral 06/24/2020    Procedure: INJECTION, STEROID, EPIDURAL, TRANSFORAMINAL APPROACH;  Surgeon: Lawrence Marin MD;  Location: [facility] PAIN MGT;  Service: Pain Management;  Laterality: Bilateral;    TRANSFORAMINAL EPIDURAL INJECTION OF STEROID Bilateral 01/28/2022    Procedure: INJECTION, STEROID, EPIDURAL, TRANSFORAMINAL APPROACH  Bilateral TFESI L4/L5      NEEDS CONSENT;  Surgeon: Lawrence Marin MD;  Location: [facility] PAIN MGT;  Service: Pain Management;  Laterality: Bilateral;    TRANSFORAMINAL EPIDURAL INJECTION OF STEROID Bilateral 12/23/2022    Procedure: INJECTION, STEROID, EPIDURAL, TRANSFORAMINAL APPROACH BILATERAL L4/L5 CONTRAST  NEEDS CONSENT;  Surgeon: Lawrence Marin MD;  Location: [facility] PAIN MGT;  Service: Pain Management;  Laterality: Bilateral;    UPPER GASTROINTESTINAL ENDOSCOPY         Review of patient's allergies indicates:   Allergen Reactions    Gabapentin Other (See Comments)     Makes her twitch       Current Facility-Administered Medications on File Prior to Encounter   Medication    albuterol inhaler 2 puff    EPINEPHrine (EPIPEN) 0.3 mg/0.3 mL pen injection 0.3 mg     Current Outpatient Medications on File Prior to Encounter   Medication Sig    albuterol (PROVENTIL/VENTOLIN HFA) 90 mcg/actuation inhaler Inhale 2 puffs into the lungs every 6 (six) hours as needed for Wheezing or Shortness of Breath. Rescue    albuterol-ipratropium (DUO-NEB) 2.5 mg-0.5 mg/3 mL nebulizer solution Take 3 mLs by nebulization every 6 (six) hours as needed for Wheezing. Rescue    amitriptyline (ELAVIL) 25 MG tablet Take 1 tablet (25 mg total) by mouth nightly as needed for Insomnia.    ammonium lactate 12 % Crea Apply twice daily to affected parts both feet as needed.     azelastine (ASTELIN) 137 mcg (0.1 %) nasal spray 2 sprays (274 mcg total) by Nasal route 2 (two) times daily.    budesonide-formoterol 160-4.5 mcg (SYMBICORT) 160-4.5 mcg/actuation HFAA INHALE 2 PUFFS INTO THE LUNGS EVERY 12 HOURS    cetirizine (ZYRTEC) 10 MG tablet Take 1 tablet (10 mg total) by mouth daily as needed for Allergies (itching).    clobetasoL (TEMOVATE) 0.05 % external solution Apply topically 2 (two) times daily. Prn scalp itch or rash    cyclobenzaprine (FLEXERIL) 10 MG tablet 10 mg 3 (three) times daily as needed.    diclofenac sodium (VOLTAREN) 1 % Gel Apply 2 g topically 3 (three) times daily. Apply to painful joints    empagliflozin (JARDIANCE) 10 mg tablet Take 1 tablet (10 mg total) by mouth once daily.    EPINEPHrine (EPIPEN) 0.3 mg/0.3 mL AtIn Inject 0.3 mLs (0.3 mg total) into the muscle once. for 1 dose (Patient not taking: Reported on 2/3/2023)    fluticasone propionate (FLONASE) 50 mcg/actuation nasal spray 2 sprays (100 mcg total) by Each Nostril route 2 (two) times a day.    glipiZIDE (GLUCOTROL) 2.5 MG TR24 Take 1 tablet (2.5 mg total) by mouth daily with breakfast.    levocetirizine (XYZAL) 5 MG tablet Take 1 tablet (5 mg total) by mouth every evening.    LIDOcaine HCL 2% (XYLOCAINE) 2 % jelly Apply topically as needed. Apply topically once nightly to affected part of foot/feet.    LINZESS 145 mcg Cap capsule Take 1 capsule (145 mcg total) by mouth once daily.    losartan-hydrochlorothiazide 100-25 mg (HYZAAR) 100-25 mg per tablet Take 1 tablet by mouth once daily.    montelukast (SINGULAIR) 10 mg tablet Take 1 tablet (10 mg total) by mouth every evening.    nicotine (NICODERM CQ) 21 mg/24 hr Place 1 patch onto the skin once daily.    nystatin-triamcinolone (MYCOLOG) ointment Apply topically 2 (two) times daily. (Patient not taking: Reported on 2/3/2023)    ondansetron (ZOFRAN) 8 MG tablet Take 1 tablet (8 mg total) by mouth every 8 (eight) hours as needed for Nausea.     oxyCODONE-acetaminophen (PERCOCET) 7.5-325 mg per tablet Take 1 tablet by mouth 3 (three) times daily as needed for Pain.    pantoprazole (PROTONIX) 40 MG tablet Take 1 tablet (40 mg total) by mouth 2 (two) times daily.    pregabalin (LYRICA) 50 MG capsule Take 1 capsule (50 mg total) by mouth 3 (three) times daily.    QUEtiapine (SEROQUEL) 25 MG Tab Take 1 tablet (25 mg total) by mouth once daily.    rimegepant (NURTEC) 75 mg odt Take 1 tablet (75 mg total) by mouth once as needed for Migraine. Place ODT tablet on the tongue; alternatively the ODT tablet may be placed under the tongue    semaglutide (OZEMPIC) 1 mg/dose (4 mg/3 mL) Inject 1 mg into the skin every 7 days.    sucralfate (CARAFATE) 100 mg/mL suspension Take 1 g by mouth 3 (three) times daily.    sulindac (CLINORIL) 200 MG Tab Take 1 tablet (200 mg total) by mouth 2 (two) times daily.    topiramate (TOPAMAX) 100 MG tablet Take 1.5 tablets (150 mg total) by mouth every evening.    triamcinolone acetonide 0.1% (KENALOG) 0.1 % cream Apply topically 2 (two) times daily. Prn focally for rash spots on forehead and legs.Stop using steroid topical when skin is smooth and non itchy.  Do not treat dark or red coloring.    venlafaxine (EFFEXOR-XR) 75 MG 24 hr capsule Take 1 capsule (75 mg total) by mouth once daily.    [DISCONTINUED] alcohol swabs (BD ALCOHOL SWABS) PadM Apply 1 each topically 2 (two) times a day.    [DISCONTINUED] blood sugar diagnostic Strp 1 each by Misc.(Non-Drug; Combo Route) route 4 (four) times daily before meals and nightly. ACCU CHEK ELVIA PLUS METER    [DISCONTINUED] blood-glucose meter (ACCU-CHEK ELVIA PLUS METER) [facility] [place] FOUR TIMES DAILY BEFORE MEALS AND EVERY NIGHT    [DISCONTINUED] lancets (ACCU-CHEK FASTCLIX LANCET DRUM) Misc 1 Device by Misc.(Non-Drug; Combo Route) route 2 (two) times a day.    [DISCONTINUED] lancets (ACCU-CHEK SOFTCLIX LANCETS) Misc 1 Device by Misc.(Non-Drug; Combo Route) route 4 (four) times  "daily.    [DISCONTINUED] lidocaine (LIDODERM) 5 % Place 1 patch onto the skin once daily. Remove & Discard patch within 12 hours or as directed by MD    [DISCONTINUED] methylPREDNISolone (MEDROL DOSEPACK) 4 mg tablet use as directed    [DISCONTINUED] semaglutide 2 mg/dose (8 mg/3 mL) PnIj Inject 2 mg into the skin every 7 days. (Patient not taking: Reported on 2/3/2023)     Family History       Problem Relation (Age of Onset)    Breast cancer Maternal Grandmother    COPD Father    Cancer Maternal Grandmother    Cataracts Mother, Father    Colon cancer Mother (50), Maternal Grandmother    Colon polyps Mother, Maternal Grandmother    Diabetes Mother, Daughter, Daughter    Glaucoma Father    Heart attack Father    Heart disease Father    Hypertension Mother, Father, Daughter    No Known Problems Brother, Daughter, Son, Maternal Aunt, Maternal Uncle, Paternal Aunt, Paternal Uncle, Maternal Grandfather, Paternal Grandmother, Paternal Grandfather    Obesity Daughter    Ulcers Father          Tobacco Use    Smoking status: Every Day     Packs/day: 0.25     Years: 27.00     Pack years: 6.75     Types: Cigarettes     Start date: 1/1/1992     Passive exposure: Current    Smokeless tobacco: Never    Tobacco comments:     "1/2 pack per every 2 days or so"           10/21/22 patient says its less than half a pack now. Has been cutting back.   Substance and Sexual Activity    Alcohol use: Yes     Alcohol/week: 1.0 standard drink     Types: 1 Shots of liquor per week     Comment: occasionally    Drug use: No    Sexual activity: Yes     Partners: Male     Birth control/protection: None     Comment:      Review of Systems   Constitutional:  Negative for chills, fatigue and fever.   HENT:  Negative for congestion and rhinorrhea.    Eyes:  Negative for photophobia and visual disturbance.   Respiratory:  Negative for cough and shortness of breath.    Cardiovascular:  Negative for chest pain, palpitations and leg " swelling.   Gastrointestinal:  Negative for abdominal pain, diarrhea, nausea and vomiting.   Endocrine: Positive for polyuria.   Genitourinary:  Negative for dysuria, frequency and urgency.   Skin:  Negative for pallor, rash and wound.   Neurological:  Negative for light-headedness and headaches.   Psychiatric/Behavioral:  Negative for confusion and decreased concentration.    Objective:     Vital Signs (Most Recent):  Temp: 98.2 °F (36.8 °C) (02/09/23 1603)  Pulse: 84 (02/09/23 1829)  Resp: (!) 27 (02/09/23 1829)  BP: 137/72 (02/09/23 1603)  SpO2: 96 % (02/09/23 1829)   Vital Signs (24h Range):  Temp:  [98 °F (36.7 °C)-98.2 °F (36.8 °C)] 98.2 °F (36.8 °C)  Pulse:  [] 84  Resp:  [16-27] 27  SpO2:  [95 %-98 %] 96 %  BP: (116-143)/(72-88) 137/72     Weight: (!) 158 kg (348 lb 5.2 oz)  Body mass index is 59.79 kg/m².    Physical Exam  Vitals and nursing note reviewed.   Constitutional:       General: She is not in acute distress.     Appearance: She is well-developed.   HENT:      Head: Normocephalic and atraumatic.      Right Ear: External ear normal.      Left Ear: External ear normal.      Nose: Nose normal.   Eyes:      Conjunctiva/sclera: Conjunctivae normal.      Pupils: Pupils are equal, round, and reactive to light.   Cardiovascular:      Rate and Rhythm: Normal rate and regular rhythm.   Pulmonary:      Effort: Pulmonary effort is normal. No respiratory distress.      Breath sounds: Normal breath sounds. No wheezing.   Abdominal:      General: Bowel sounds are normal. There is no distension.      Palpations: Abdomen is soft.      Tenderness: There is no abdominal tenderness.      Comments: No palpable hepatomegaly or splenomegaly    Musculoskeletal:         General: No tenderness. Normal range of motion.      Cervical back: Normal range of motion and neck supple.   Skin:     General: Skin is warm and dry.   Neurological:      Mental Status: She is alert and oriented to person, place, and time.    Psychiatric:         Thought Content: Thought content normal.         CRANIAL NERVES     CN III, IV, VI   Pupils are equal, round, and reactive to light.     Significant Labs: All pertinent labs within the past 24 hours have been reviewed.    Significant Imaging: I have reviewed all pertinent imaging results/findings within the past 24 hours.    Assessment/Plan:     * DUSTIN (acute kidney injury)  Patient with acute kidney injury likely due to IVVD/dehydration DUSTIN is currently worsening. Labs reviewed- Renal function/electrolytes with Estimated Creatinine Clearance: 51 mL/min (A) (based on SCr of 2 mg/dL (H)). according to latest data. Monitor urine output and serial BMP and adjust therapy as needed. Avoid nephrotoxins and renally dose meds for GFR listed above.     Type 2 diabetes mellitus with stage 3 chronic kidney disease, with long-term current use of insulin  Patient's FSGs are uncontrolled due to hyperglycemia on current medication regimen.  Last A1c reviewed-   Lab Results   Component Value Date    HGBA1C 5.8 (H) 12/20/2022     Most recent fingerstick glucose reviewed-   Recent Labs   Lab 02/09/23  1334 02/09/23  1645   POCTGLUCOSE >500* 369*     Current correctional scale  Medium  Maintain anti-hyperglycemic dose as follows-   Antihyperglycemics (From admission, onward)    Start     Stop Route Frequency Ordered    02/09/23 2100  insulin detemir U-100 pen 15 Units         -- SubQ Nightly 02/09/23 1703    02/09/23 1802  insulin aspart U-100 pen 1-10 Units         -- SubQ Before meals & nightly PRN 02/09/23 1703        Hold Oral hypoglycemics while patient is in the hospital.    Recurrent major depressive disorder, in full remission  Patient has persistent depression which is mild and is currently controlled. Will Continue anti-depressant medications. We will not consult psychiatry at this time. Patient does not display psychosis at this time. Continue to monitor closely and adjust plan of care as  needed.    Hyperlipidemia  Not on statin, defer to PCP, continue cardiac diet    Essential hypertension  Well controlled, continue home medications and monitor blood pressure, adjust as needed.       VTE Risk Mitigation (From admission, onward)         Ordered     enoxaparin injection 40 mg  Daily         02/09/23 1703     IP VTE HIGH RISK PATIENT  Once         02/09/23 1703     Place sequential compression device  Until discontinued         02/09/23 1703               As clarification, on 02/09/2023, patient should be placed in hospital observation services under my care in collaboration with MD Eduardo Kennedy III, Jr., APRN, [facility]  Hospitalist - Department of Hospital Medicine  [facility] [address] [place] [address]  Office #: [phone]; Pager #: [phone]

## 2023-02-14 ENCOUNTER — PATIENT MESSAGE (OUTPATIENT)
Dept: BARIATRICS | Facility: CLINIC | Age: 53
End: 2023-02-14
Payer: MEDICARE

## 2023-02-15 ENCOUNTER — LAB VISIT (OUTPATIENT)
Dept: LAB | Facility: HOSPITAL | Age: 53
End: 2023-02-15
Payer: MEDICARE

## 2023-02-15 ENCOUNTER — OFFICE VISIT (OUTPATIENT)
Dept: FAMILY MEDICINE | Facility: CLINIC | Age: 53
End: 2023-02-15
Payer: MEDICARE

## 2023-02-15 VITALS
RESPIRATION RATE: 16 BRPM | DIASTOLIC BLOOD PRESSURE: 78 MMHG | OXYGEN SATURATION: 97 % | WEIGHT: 293 LBS | HEIGHT: 64 IN | SYSTOLIC BLOOD PRESSURE: 118 MMHG | TEMPERATURE: 99 F | HEART RATE: 75 BPM | BODY MASS INDEX: 50.02 KG/M2

## 2023-02-15 DIAGNOSIS — E11.22 TYPE 2 DIABETES MELLITUS WITH STAGE 3 CHRONIC KIDNEY DISEASE, WITH LONG-TERM CURRENT USE OF INSULIN, UNSPECIFIED WHETHER STAGE 3A OR 3B CKD: Chronic | ICD-10-CM

## 2023-02-15 DIAGNOSIS — Z79.4 TYPE 2 DIABETES MELLITUS WITH STAGE 3 CHRONIC KIDNEY DISEASE, WITH LONG-TERM CURRENT USE OF INSULIN, UNSPECIFIED WHETHER STAGE 3A OR 3B CKD: Chronic | ICD-10-CM

## 2023-02-15 DIAGNOSIS — I10 ESSENTIAL HYPERTENSION: Chronic | ICD-10-CM

## 2023-02-15 DIAGNOSIS — N18.30 TYPE 2 DIABETES MELLITUS WITH STAGE 3 CHRONIC KIDNEY DISEASE, WITH LONG-TERM CURRENT USE OF INSULIN, UNSPECIFIED WHETHER STAGE 3A OR 3B CKD: Chronic | ICD-10-CM

## 2023-02-15 DIAGNOSIS — I10 ESSENTIAL HYPERTENSION: Primary | Chronic | ICD-10-CM

## 2023-02-15 LAB
ANION GAP SERPL CALC-SCNC: 10 MMOL/L (ref 8–16)
BUN SERPL-MCNC: 10 MG/DL (ref 6–20)
CALCIUM SERPL-MCNC: 8.8 MG/DL (ref 8.7–10.5)
CHLORIDE SERPL-SCNC: 106 MMOL/L (ref 95–110)
CO2 SERPL-SCNC: 25 MMOL/L (ref 23–29)
CREAT SERPL-MCNC: 1.3 MG/DL (ref 0.5–1.4)
EST. GFR  (NO RACE VARIABLE): 50 ML/MIN/1.73 M^2
GLUCOSE SERPL-MCNC: 296 MG/DL (ref 70–110)
POTASSIUM SERPL-SCNC: 4.3 MMOL/L (ref 3.5–5.1)
SODIUM SERPL-SCNC: 141 MMOL/L (ref 136–145)

## 2023-02-15 PROCEDURE — 99214 PR OFFICE/OUTPT VISIT, EST, LEVL IV, 30-39 MIN: ICD-10-PCS | Mod: HCNC,S$GLB,, | Performed by: NURSE PRACTITIONER

## 2023-02-15 PROCEDURE — 3072F PR LOW RISK FOR RETINOPATHY: ICD-10-PCS | Mod: HCNC,CPTII,S$GLB, | Performed by: NURSE PRACTITIONER

## 2023-02-15 PROCEDURE — 36415 COLL VENOUS BLD VENIPUNCTURE: CPT | Mod: HCNC,PN | Performed by: NURSE PRACTITIONER

## 2023-02-15 PROCEDURE — 3008F PR BODY MASS INDEX (BMI) DOCUMENTED: ICD-10-PCS | Mod: HCNC,CPTII,S$GLB, | Performed by: NURSE PRACTITIONER

## 2023-02-15 PROCEDURE — 3008F BODY MASS INDEX DOCD: CPT | Mod: HCNC,CPTII,S$GLB, | Performed by: NURSE PRACTITIONER

## 2023-02-15 PROCEDURE — 3072F LOW RISK FOR RETINOPATHY: CPT | Mod: HCNC,CPTII,S$GLB, | Performed by: NURSE PRACTITIONER

## 2023-02-15 PROCEDURE — 3078F PR MOST RECENT DIASTOLIC BLOOD PRESSURE < 80 MM HG: ICD-10-PCS | Mod: HCNC,CPTII,S$GLB, | Performed by: NURSE PRACTITIONER

## 2023-02-15 PROCEDURE — 1160F RVW MEDS BY RX/DR IN RCRD: CPT | Mod: HCNC,CPTII,S$GLB, | Performed by: NURSE PRACTITIONER

## 2023-02-15 PROCEDURE — 1159F MED LIST DOCD IN RCRD: CPT | Mod: HCNC,CPTII,S$GLB, | Performed by: NURSE PRACTITIONER

## 2023-02-15 PROCEDURE — 1160F PR REVIEW ALL MEDS BY PRESCRIBER/CLIN PHARMACIST DOCUMENTED: ICD-10-PCS | Mod: HCNC,CPTII,S$GLB, | Performed by: NURSE PRACTITIONER

## 2023-02-15 PROCEDURE — 99999 PR PBB SHADOW E&M-EST. PATIENT-LVL V: CPT | Mod: PBBFAC,HCNC,, | Performed by: NURSE PRACTITIONER

## 2023-02-15 PROCEDURE — 3074F PR MOST RECENT SYSTOLIC BLOOD PRESSURE < 130 MM HG: ICD-10-PCS | Mod: HCNC,CPTII,S$GLB, | Performed by: NURSE PRACTITIONER

## 2023-02-15 PROCEDURE — 3078F DIAST BP <80 MM HG: CPT | Mod: HCNC,CPTII,S$GLB, | Performed by: NURSE PRACTITIONER

## 2023-02-15 PROCEDURE — 99214 OFFICE O/P EST MOD 30 MIN: CPT | Mod: HCNC,S$GLB,, | Performed by: NURSE PRACTITIONER

## 2023-02-15 PROCEDURE — 99999 PR PBB SHADOW E&M-EST. PATIENT-LVL V: ICD-10-PCS | Mod: PBBFAC,HCNC,, | Performed by: NURSE PRACTITIONER

## 2023-02-15 PROCEDURE — 3074F SYST BP LT 130 MM HG: CPT | Mod: HCNC,CPTII,S$GLB, | Performed by: NURSE PRACTITIONER

## 2023-02-15 PROCEDURE — 80048 BASIC METABOLIC PNL TOTAL CA: CPT | Mod: HCNC | Performed by: NURSE PRACTITIONER

## 2023-02-15 PROCEDURE — 1159F PR MEDICATION LIST DOCUMENTED IN MEDICAL RECORD: ICD-10-PCS | Mod: HCNC,CPTII,S$GLB, | Performed by: NURSE PRACTITIONER

## 2023-02-15 NOTE — PROGRESS NOTES
Routine Office Visit    Patient Name: Yanni Kaiser    : 1970  MRN: [id number]    Chief Complaint:  ER, diabetes follow-up    Subjective:  Yanni is a 52 y.o. female who presents today for:    1. ER, diabetes follow-up - patient who is known to me reports today for evaluation.  Last week I asked her to go to emergency room due to dehydration from elevated blood sugars.  She did go and was given IV fluids as well as insulin and was recommended to stay in observation but she left AMA.  Since discharge she did start her Ozempic 1 milligram and glipizide daily.  She has been drinking lots of Powerade 0 to help with the dehydration.  She reports holding her losartan HCTZ as well.  Blood sugar this morning at home was in the 400s but she has had some readings in the last week in the mid 200s.  She notes her urinary frequency and dry mouth are still present but improving.  No dizziness or chest pain but does have some intermittent blurred vision.  She was scheduled for a steroid injection in the knee at her orthopedics office yesterday but did reschedule this.  She is trying to stay off of all steroids possible.  She states she has not taken her glipizide yet today because she has not eaten yet.    Past Medical History  Past Medical History:   Diagnosis Date    Allergy     Anemia     Anxiety     Asthma     Back pain     Chronic bronchitis     Chronic obstructive pulmonary disease, unspecified COPD type 2022    Cigarette smoker     DDD (degenerative disc disease), lumbar 2020    Depression     Diabetes mellitus with peripheral circulatory disorder 2022    Diabetes with neurologic complications     DUB (dysfunctional uterine bleeding) 10/16/2018    GERD (gastroesophageal reflux disease)     High cholesterol     Hyperprolactinemia     Hypertension     Influenza A 2020    Neuromuscular disorder     Obese body habitus     Obesity     Pseudotumor cerebri     Renal manifestation of secondary  diabetes mellitus     Respiratory failure     Seizures     Simple endometrial hyperplasia     Sleep apnea     Smoker     Tobacco dependence     Urinary incontinence        Past Surgical History  Past Surgical History:   Procedure Laterality Date    ANTROSTOMY OF MAXILLARY SINUS AT INFERIOR NASAL MEATUS  10/22/2021    Procedure: MAXILLARY ANTROSTOMY, AT INFERIOR NASAL MEATUS;  Surgeon: John Prado III, MD;  Location: [facility] OR;  Service: ENT;;    BREAST CYST ASPIRATION       SECTION      X 1    CHOLECYSTECTOMY      COLONOSCOPY      COLONOSCOPY N/A 2019    Procedure: COLONOSCOPY;  Surgeon: Jagruti Sweeney MD;  [facility]  Service: Endoscopy;  Laterality: N/A;  BMI is 63/gastroparesis/3 days full liquid diet 1 day clear liquid see telephone encounter by [facility]    EPIDURAL STEROID INJECTION N/A 2020    Procedure: INJECTION, STEROID, EPIDURAL, L5-S1 IL;  Surgeon: Lawrence Marin MD;  Location: [facility] PAIN MGT;  Service: Pain Management;  Laterality: N/A;    ESOPHAGEAL MANOMETRY WITH MEASUREMENT OF IMPEDANCE N/A 2019    Procedure: MANOMETRY-ESOPHAGEAL-WITH IMPEDANCE;  Surgeon: Jagruti Sweeney MD;  [facility] ([address] [place] [address]);  Service: Endoscopy;  Laterality: N/A;  Hold Narcotics x 1 days   Hold TCA x 1 days  Propofol only. No fentanyl or benzodiazepine during sedation. If additional sedation needed, discuss with Dr. Sweeney.  10/29 - pt confirmed appt    ESOPHAGOGASTRODUODENOSCOPY N/A 2019    Procedure: EGD (ESOPHAGOGASTRODUODENOSCOPY);  Surgeon: Jagruti Sweeney MD;  Location: [facility] (2ND FLR);  Service: Endoscopy;  Laterality: N/A;  BMI is 63/gastroparesis/3 days full liquid diet 1 day clear liquid see telephone encounter by Ci[facility] Brown     ESOPHAGOGASTRODUODENOSCOPY N/A 2019    Procedure: ESOPHAGOGASTRODUODENOSCOPY (EGD);  Surgeon: Jagruti Sweeney MD;  [facility]: [facility] (2ND FLR);  Service: Endoscopy;  Laterality: N/A;  EGD  with EndoFlip /+/- Botox  2nd floor for gastroparesis/BMI 63 **367lbs**  Bariatric Stretcher needed  Manometry probe to be placed endoscopically during EGD procedure  Due to change in protocol, Full liquid diet for 3 days/ 1 day of clear liquids  Hi    ESOPHAGOGASTRODUODENOSCOPY N/A 12/14/2020    Procedure: ESOPHAGOGASTRODUODENOSCOPY (EGD) with BRAVO;  Surgeon: Jagruti Sweeney MD;  Location: [facility] ENDO (2ND FLR);  Service: Endoscopy;  Laterality: N/A;  with Dilation  2nd floor due to BMI 56.20 (327 lbs) and gastroparesis  3 days full liquid diet and 1 day clears    ESOPHAGOGASTRODUODENOSCOPY N/A 04/15/2021    Procedure: ESOPHAGOGASTRODUODENOSCOPY (EGD);  Surgeon: Jagruti Sweeney MD;  Location: [facility] ENDO (2ND FLR);  Service: Endoscopy;  Laterality: N/A;  Endoflip  2nd floor-gastroparesis, BMI 57.18, bariatric stretcher  full liquid diet x3 days, clear liquid diet x1 day prior to procedure  propofol only  covid test 4/12 primary care, instructions emailed-[facility]    FOOT FRACTURE SURGERY Left     FUNCTIONAL ENDOSCOPIC SINUS SURGERY (FESS) USING COMPUTER-ASSISTED NAVIGATION Bilateral 10/22/2021    Procedure: FESS, USING COMPUTER-ASSISTED NAVIGATION;  Surgeon: John Prado III, MD;  Location: [facility] OR;  Service: ENT;  Laterality: Bilateral;  FOLLOW DR PRADO ANESTHESIA PROTOCAL / pt will stay 23 hour post surgery for SHARATH observation    HYSTEROSCOPY WITH DILATION AND CURETTAGE OF UTERUS N/A 10/16/2018    [facility]    INJECTION OF ANESTHETIC AGENT AROUND NERVE Bilateral 10/23/2020    Procedure: BLOCK, NERVE  bilateral L3,4,5 MBB;  Surgeon: Lawrence Marin MD;  Location: [facility] PAIN MGT;  Service: Pain Management;  Laterality: Bilateral;  bilateral L3,4,5 MBB   consent needed    INJECTION OF ANESTHETIC AGENT AROUND NERVE Bilateral 02/18/2022    Procedure: BLOCK, NERVE, L3-L4-L5 MEDIAL BRANCH;  Surgeon: Lawrence Marin MD;  Location: [facility] PAIN MGT;  Service: Pain Management;  Laterality: Bilateral;    PLANTAR FASCIOTOMY Left  11/18/2019    Procedure: FASCIOTOMY, PLANTAR;  Surgeon: Valentino Orourke DPM;  [address]  Service: Podiatry;  Laterality: Left;    RETINAL LASER PROCEDURE Left     SINUS SURGERY      SPHENOIDECTOMY Bilateral 10/22/2021    Procedure: SPHENOIDECTOMY;  Surgeon: John Prado III, MD;  Location: [facility] OR;  Service: ENT;  Laterality: Bilateral;    TRANSFORAMINAL EPIDURAL INJECTION OF STEROID Bilateral 06/24/2020    Procedure: INJECTION, STEROID, EPIDURAL, TRANSFORAMINAL APPROACH;  Surgeon: Lawrence Marin MD;  Location: [facility] [place] PAIN MGT;  Service: Pain Management;  Laterality: Bilateral;    TRANSFORAMINAL EPIDURAL INJECTION OF STEROID Bilateral 01/28/2022    Procedure: INJECTION, STEROID, EPIDURAL, TRANSFORAMINAL APPROACH  Bilateral TFESI L4/L5      NEEDS CONSENT;  Surgeon: Lawrence Marin MD;  Location: [facility] [place] PAIN MGT;  Service: Pain Management;  Laterality: Bilateral;    TRANSFORAMINAL EPIDURAL INJECTION OF STEROID Bilateral 12/23/2022    Procedure: INJECTION, STEROID, EPIDURAL, TRANSFORAMINAL APPROACH BILATERAL L4/L5 CONTRAST  NEEDS CONSENT;  Surgeon: Lawrence Marin MD;  Location: [facility] [place] PAIN MGT;  Service: Pain Management;  Laterality: Bilateral;    UPPER GASTROINTESTINAL ENDOSCOPY         Family History  Family History   Problem Relation Age of Onset    Hypertension Mother     Diabetes Mother     Colon cancer Mother 50    Colon polyps Mother     Cataracts Mother     Heart disease Father     COPD Father     Heart attack Father     Hypertension Father     Ulcers Father     Cataracts Father     Glaucoma Father     No Known Problems Brother     Diabetes Daughter         prediabetes    Diabetes Daughter         prediabetic    Hypertension Daughter     Obesity Daughter     No Known Problems Daughter     No Known Problems Son     No Known Problems Maternal Aunt     No Known Problems Maternal Uncle     No Known Problems Paternal Aunt     No Known Problems Paternal Uncle     Cancer Maternal Grandmother      "Breast cancer Maternal Grandmother     Colon cancer Maternal Grandmother     Colon polyps Maternal Grandmother     No Known Problems Maternal Grandfather     No Known Problems Paternal Grandmother     No Known Problems Paternal Grandfather     Celiac disease Neg Hx     Cirrhosis Neg Hx     Crohn's disease Neg Hx     Cystic fibrosis Neg Hx     Esophageal cancer Neg Hx     Hemochromatosis Neg Hx     Inflammatory bowel disease Neg Hx     Irritable bowel syndrome Neg Hx     Liver cancer Neg Hx     Liver disease Neg Hx     Rectal cancer Neg Hx     Stomach cancer Neg Hx     Ulcerative colitis Neg Hx     Jonnie's disease Neg Hx     Tuberculosis Neg Hx     Lymphoma Neg Hx     Scleroderma Neg Hx     Rheum arthritis Neg Hx     Melanoma Neg Hx     Multiple sclerosis Neg Hx     Psoriasis Neg Hx     Lupus Neg Hx     Skin cancer Neg Hx     Amblyopia Neg Hx     Blindness Neg Hx     Macular degeneration Neg Hx     Retinal detachment Neg Hx     Strabismus Neg Hx     Stroke Neg Hx     Thyroid disease Neg Hx     Allergic rhinitis Neg Hx     Allergies Neg Hx     Angioedema Neg Hx     Asthma Neg Hx     Eczema Neg Hx     Immunodeficiency Neg Hx     Rhinitis Neg Hx     Urticaria Neg Hx     Atopy Neg Hx        Social History  Social History     Socioeconomic History    Marital status:     Number of children: 4   Occupational History    Occupation: disable   Tobacco Use    Smoking status: Every Day     Packs/day: 0.25     Years: 27.00     Pack years: 6.75     Types: Cigarettes     Start date: 1/1/1992     Passive exposure: Current    Smokeless tobacco: Never    Tobacco comments:     "1/2 pack per every 2 days or so"           10/21/22 patient says its less than half a pack now. Has been cutting back.   Substance and Sexual Activity    Alcohol use: Yes     Alcohol/week: 1.0 standard drink     Types: 1 Shots of liquor per week     Comment: occasionally    Drug use: No    Sexual activity: Yes     Partners: Male     Birth " control/protection: None     Comment:      Social Determinants of Health     Financial Resource Strain: High Risk    Difficulty of Paying Living Expenses: Hard   Food Insecurity: Food Insecurity Present    Worried About Running Out of Food in the Last Year: Sometimes true    Ran Out of Food in the Last Year: Sometimes true   Transportation Needs: Unmet Transportation Needs    Lack of Transportation (Medical): Yes    Lack of Transportation (Non-Medical): Yes   Physical Activity: Inactive    Days of Exercise per Week: 0 days    Minutes of Exercise per Session: 0 min   Stress: Stress Concern Present    Feeling of Stress : To some extent   Social Connections: Unknown    Frequency of Communication with Friends and Family: Three times a week    Frequency of Social Gatherings with Friends and Family: Twice a week    Active Member of Clubs or Organizations: Yes    Attends Club or Organization Meetings: More than 4 times per year    Marital Status:    Housing Stability: High Risk    Unable to Pay for Housing in the Last Year: Yes    Number of Places Lived in the Last Year: 1    Unstable Housing in the Last Year: No       Current Medications  Current Outpatient Medications on File Prior to Visit   Medication Sig Dispense Refill    albuterol (PROVENTIL/VENTOLIN HFA) 90 mcg/actuation inhaler Inhale 2 puffs into the lungs every 6 (six) hours as needed for Wheezing or Shortness of Breath. Rescue 54 g 6    albuterol-ipratropium (DUO-NEB) 2.5 mg-0.5 mg/3 mL nebulizer solution Take 3 mLs by nebulization every 6 (six) hours as needed for Wheezing. Rescue 75 mL 2    amitriptyline (ELAVIL) 25 MG tablet Take 1 tablet (25 mg total) by mouth nightly as needed for Insomnia. 90 tablet 1    ammonium lactate 12 % Crea Apply twice daily to affected parts both feet as needed. 140 g 11    azelastine (ASTELIN) 137 mcg (0.1 %) nasal spray 2 sprays (274 mcg total) by Nasal route 2 (two) times daily. 60 mL 11    blood sugar diagnostic  (BLOOD GLUCOSE TEST) Strp 1 strip by Misc.(Non-Drug; Combo Route) route 2 (two) times a day. Insurance covers 100 each 11    blood-glucose meter kit Insurance covers 1 each 0    budesonide-formoterol 160-4.5 mcg (SYMBICORT) 160-4.5 mcg/actuation HFAA INHALE 2 PUFFS INTO THE LUNGS EVERY 12 HOURS 30.6 g 3    cetirizine (ZYRTEC) 10 MG tablet Take 1 tablet (10 mg total) by mouth daily as needed for Allergies (itching). 90 tablet 1    clobetasoL (TEMOVATE) 0.05 % external solution Apply topically 2 (two) times daily. Prn scalp itch or rash 60 mL 1    cyclobenzaprine (FLEXERIL) 10 MG tablet 10 mg 3 (three) times daily as needed.      diclofenac sodium (VOLTAREN) 1 % Gel Apply 2 g topically 3 (three) times daily. Apply to painful joints 300 g 2    empagliflozin (JARDIANCE) 10 mg tablet Take 1 tablet (10 mg total) by mouth once daily. 90 tablet 1    fluticasone propionate (FLONASE) 50 mcg/actuation nasal spray 2 sprays (100 mcg total) by Each Nostril route 2 (two) times a day. 32 g 11    glipiZIDE (GLUCOTROL) 2.5 MG TR24 Take 1 tablet (2.5 mg total) by mouth daily with breakfast. 90 tablet 0    levocetirizine (XYZAL) 5 MG tablet Take 1 tablet (5 mg total) by mouth every evening. 90 tablet 1    LIDOcaine HCL 2% (XYLOCAINE) 2 % jelly Apply topically as needed. Apply topically once nightly to affected part of foot/feet. 30 mL 2    LINZESS 145 mcg Cap capsule Take 1 capsule (145 mcg total) by mouth once daily. 90 capsule 1    losartan-hydrochlorothiazide 100-25 mg (HYZAAR) 100-25 mg per tablet Take 1 tablet by mouth once daily. 90 tablet 3    montelukast (SINGULAIR) 10 mg tablet Take 1 tablet (10 mg total) by mouth every evening. 90 tablet 1    nicotine (NICODERM CQ) 21 mg/24 hr Place 1 patch onto the skin once daily. 28 patch 0    nystatin-triamcinolone (MYCOLOG) ointment Apply topically 2 (two) times daily. 60 g 2    ondansetron (ZOFRAN) 8 MG tablet Take 1 tablet (8 mg total) by mouth every 8 (eight) hours as needed for  Nausea. 90 tablet 11    oxyCODONE-acetaminophen (PERCOCET) 7.5-325 mg per tablet Take 1 tablet by mouth 3 (three) times daily as needed for Pain. 90 tablet 0    pantoprazole (PROTONIX) 40 MG tablet Take 1 tablet (40 mg total) by mouth 2 (two) times daily. 180 tablet 1    QUEtiapine (SEROQUEL) 25 MG Tab Take 1 tablet (25 mg total) by mouth once daily. 90 tablet 1    semaglutide (OZEMPIC) 1 mg/dose (4 mg/3 mL) Inject 1 mg into the skin every 7 days. 4 pen 0    sucralfate (CARAFATE) 100 mg/mL suspension Take 1 g by mouth 3 (three) times daily.      sulindac (CLINORIL) 200 MG Tab Take 1 tablet (200 mg total) by mouth 2 (two) times daily. 30 tablet 0    topiramate (TOPAMAX) 100 MG tablet Take 1.5 tablets (150 mg total) by mouth every evening. 180 tablet 1    triamcinolone acetonide 0.1% (KENALOG) 0.1 % cream Apply topically 2 (two) times daily. Prn focally for rash spots on forehead and legs.Stop using steroid topical when skin is smooth and non itchy.  Do not treat dark or red coloring. 45 g 0    venlafaxine (EFFEXOR-XR) 75 MG 24 hr capsule Take 1 capsule (75 mg total) by mouth once daily. 90 capsule 1    EPINEPHrine (EPIPEN) 0.3 mg/0.3 mL AtIn Inject 0.3 mLs (0.3 mg total) into the muscle once. for 1 dose (Patient not taking: Reported on 2/3/2023) 2 each 1    pregabalin (LYRICA) 50 MG capsule Take 1 capsule (50 mg total) by mouth 3 (three) times daily. 90 capsule 3     Current Facility-Administered Medications on File Prior to Visit   Medication Dose Route Frequency Provider Last Rate Last Admin    albuterol inhaler 2 puff  2 puff Inhalation Q20 Min PRN Carlyle Gordillo MD        EPINEPHrine (EPIPEN) 0.3 mg/0.3 mL pen injection 0.3 mg  0.3 mg Intramuscular PRN Carlyle Gordillo MD           Allergies   Review of patient's allergies indicates:   Allergen Reactions    Gabapentin Other (See Comments)     Makes her twitch       Review of Systems (Pertinent positives)  Review of Systems   Constitutional: Negative.    HENT:  "Negative.     Eyes:  Positive for blurred vision. Negative for double vision, photophobia, pain, discharge and redness.   Respiratory: Negative.     Cardiovascular: Negative.    Gastrointestinal: Negative.    Genitourinary:  Positive for frequency. Negative for dysuria and urgency.   Skin: Negative.    Neurological: Negative.    Endo/Heme/Allergies:  Positive for polydipsia. Negative for environmental allergies. Does not bruise/bleed easily.   Psychiatric/Behavioral: Negative.       /78 (BP Location: Right arm, Patient Position: Sitting, BP Method: Medium (Manual))   Pulse 75   Temp 99.4 °F (37.4 °C) (Oral)   Resp 16   Ht 5' 4" (1.626 m)   Wt (!) 161.9 kg (356 lb 14.8 oz)   LMP 08/17/2019   SpO2 97%   BMI 61.27 kg/m²     Physical Exam  Vitals reviewed.   Constitutional:       General: She is not in acute distress.     Appearance: Normal appearance. She is obese. She is not ill-appearing, toxic-appearing or diaphoretic.   HENT:      Head: Normocephalic and atraumatic.   Cardiovascular:      Rate and Rhythm: Normal rate and regular rhythm.      Pulses: Normal pulses.      Heart sounds: Normal heart sounds.   Pulmonary:      Effort: Pulmonary effort is normal. No respiratory distress.      Breath sounds: Normal breath sounds. No wheezing.   Musculoskeletal:         General: No swelling, tenderness or deformity. Normal range of motion.   Skin:     General: Skin is warm and dry.      Capillary Refill: Capillary refill takes less than 2 seconds.   Neurological:      Mental Status: She is alert and oriented to person, place, and time.   Psychiatric:         Mood and Affect: Mood normal.         Behavior: Behavior normal.        Assessment/Plan:  Yanni Kaiser is a 52 y.o. female who presents today for :    Yanni was seen today for follow-up and diabetes.    Diagnoses and all orders for this visit:    Essential hypertension  -     BASIC METABOLIC PANEL; Future    Type 2 diabetes mellitus with stage " 3 chronic kidney disease, with long-term current use of insulin, unspecified whether stage 3a or 3b CKD  -     BASIC METABOLIC PANEL; Future  -     Ambulatory referral/consult to Endocrinology; Future    Perhaps some mild improvement in blood sugars with glipizide and Ozempic although minute at this time.  Recommended patient to increase glipizide to 2 pills per day.  Will recheck lab work.  Recommended patient to drink plenty of fluids and stay hydrated.  Hopefully sugars will begin to come down now that the patient is off of steroids.  Will consult endocrinology for assistance with diabetic management.  Continue to hold losartan HCTZ and Jardiance.  If Ozempic tolerated for the next 2-3 weeks can increase back to 2 mg.  I have asked the patient to message me with her readings on my chart next couple of days.  Will follow-up with lab work.  All questions answered.        This office note has been dictated.  This dictation has been generated using M-Modal Fluency Direct dictation; some phonetic errors may occur.   My collaborating physician is Dr. Jose Rausch.

## 2023-02-16 ENCOUNTER — PATIENT MESSAGE (OUTPATIENT)
Dept: FAMILY MEDICINE | Facility: CLINIC | Age: 53
End: 2023-02-16
Payer: MEDICARE

## 2023-02-22 ENCOUNTER — PATIENT MESSAGE (OUTPATIENT)
Dept: BARIATRICS | Facility: CLINIC | Age: 53
End: 2023-02-22
Payer: MEDICARE

## 2023-02-24 ENCOUNTER — OFFICE VISIT (OUTPATIENT)
Dept: ENDOCRINOLOGY | Facility: CLINIC | Age: 53
End: 2023-02-24
Payer: MEDICARE

## 2023-02-24 VITALS
TEMPERATURE: 99 F | WEIGHT: 293 LBS | DIASTOLIC BLOOD PRESSURE: 106 MMHG | HEART RATE: 83 BPM | SYSTOLIC BLOOD PRESSURE: 175 MMHG | BODY MASS INDEX: 60.83 KG/M2

## 2023-02-24 DIAGNOSIS — E11.22 TYPE 2 DIABETES MELLITUS WITH STAGE 3 CHRONIC KIDNEY DISEASE, WITH LONG-TERM CURRENT USE OF INSULIN, UNSPECIFIED WHETHER STAGE 3A OR 3B CKD: Chronic | ICD-10-CM

## 2023-02-24 DIAGNOSIS — Z79.4 TYPE 2 DIABETES MELLITUS WITH STAGE 3 CHRONIC KIDNEY DISEASE, WITH LONG-TERM CURRENT USE OF INSULIN, UNSPECIFIED WHETHER STAGE 3A OR 3B CKD: Chronic | ICD-10-CM

## 2023-02-24 DIAGNOSIS — I10 ESSENTIAL HYPERTENSION: Chronic | ICD-10-CM

## 2023-02-24 DIAGNOSIS — N18.30 TYPE 2 DIABETES MELLITUS WITH STAGE 3 CHRONIC KIDNEY DISEASE, WITH LONG-TERM CURRENT USE OF INSULIN, UNSPECIFIED WHETHER STAGE 3A OR 3B CKD: Chronic | ICD-10-CM

## 2023-02-24 DIAGNOSIS — E55.9 VITAMIN D DEFICIENCY: ICD-10-CM

## 2023-02-24 DIAGNOSIS — E66.01 MORBID OBESITY: ICD-10-CM

## 2023-02-24 DIAGNOSIS — J44.1 ASTHMA EXACERBATION IN COPD: ICD-10-CM

## 2023-02-24 DIAGNOSIS — E11.65 TYPE 2 DIABETES MELLITUS WITH HYPERGLYCEMIA, WITHOUT LONG-TERM CURRENT USE OF INSULIN: Primary | ICD-10-CM

## 2023-02-24 DIAGNOSIS — J45.901 ASTHMA EXACERBATION IN COPD: ICD-10-CM

## 2023-02-24 LAB — GLUCOSE SERPL-MCNC: 134 MG/DL (ref 70–110)

## 2023-02-24 PROCEDURE — 82962 POCT GLUCOSE, HAND-HELD DEVICE: ICD-10-PCS | Mod: HCNC,S$GLB,, | Performed by: HOSPITALIST

## 2023-02-24 PROCEDURE — 3008F BODY MASS INDEX DOCD: CPT | Mod: HCNC,CPTII,S$GLB, | Performed by: HOSPITALIST

## 2023-02-24 PROCEDURE — 99999 PR PBB SHADOW E&M-EST. PATIENT-LVL V: CPT | Mod: PBBFAC,HCNC,, | Performed by: HOSPITALIST

## 2023-02-24 PROCEDURE — 1159F PR MEDICATION LIST DOCUMENTED IN MEDICAL RECORD: ICD-10-PCS | Mod: HCNC,CPTII,S$GLB, | Performed by: HOSPITALIST

## 2023-02-24 PROCEDURE — 99204 OFFICE O/P NEW MOD 45 MIN: CPT | Mod: HCNC,S$GLB,, | Performed by: HOSPITALIST

## 2023-02-24 PROCEDURE — 3080F PR MOST RECENT DIASTOLIC BLOOD PRESSURE >= 90 MM HG: ICD-10-PCS | Mod: HCNC,CPTII,S$GLB, | Performed by: HOSPITALIST

## 2023-02-24 PROCEDURE — 82962 GLUCOSE BLOOD TEST: CPT | Mod: HCNC,S$GLB,, | Performed by: HOSPITALIST

## 2023-02-24 PROCEDURE — 1160F PR REVIEW ALL MEDS BY PRESCRIBER/CLIN PHARMACIST DOCUMENTED: ICD-10-PCS | Mod: HCNC,CPTII,S$GLB, | Performed by: HOSPITALIST

## 2023-02-24 PROCEDURE — 3077F SYST BP >= 140 MM HG: CPT | Mod: HCNC,CPTII,S$GLB, | Performed by: HOSPITALIST

## 2023-02-24 PROCEDURE — 1160F RVW MEDS BY RX/DR IN RCRD: CPT | Mod: HCNC,CPTII,S$GLB, | Performed by: HOSPITALIST

## 2023-02-24 PROCEDURE — 3008F PR BODY MASS INDEX (BMI) DOCUMENTED: ICD-10-PCS | Mod: HCNC,CPTII,S$GLB, | Performed by: HOSPITALIST

## 2023-02-24 PROCEDURE — 3080F DIAST BP >= 90 MM HG: CPT | Mod: HCNC,CPTII,S$GLB, | Performed by: HOSPITALIST

## 2023-02-24 PROCEDURE — 3072F PR LOW RISK FOR RETINOPATHY: ICD-10-PCS | Mod: HCNC,CPTII,S$GLB, | Performed by: HOSPITALIST

## 2023-02-24 PROCEDURE — 3077F PR MOST RECENT SYSTOLIC BLOOD PRESSURE >= 140 MM HG: ICD-10-PCS | Mod: HCNC,CPTII,S$GLB, | Performed by: HOSPITALIST

## 2023-02-24 PROCEDURE — 99999 PR PBB SHADOW E&M-EST. PATIENT-LVL V: ICD-10-PCS | Mod: PBBFAC,HCNC,, | Performed by: HOSPITALIST

## 2023-02-24 PROCEDURE — 1159F MED LIST DOCD IN RCRD: CPT | Mod: HCNC,CPTII,S$GLB, | Performed by: HOSPITALIST

## 2023-02-24 PROCEDURE — 3072F LOW RISK FOR RETINOPATHY: CPT | Mod: HCNC,CPTII,S$GLB, | Performed by: HOSPITALIST

## 2023-02-24 PROCEDURE — 99204 PR OFFICE/OUTPT VISIT, NEW, LEVL IV, 45-59 MIN: ICD-10-PCS | Mod: HCNC,S$GLB,, | Performed by: HOSPITALIST

## 2023-02-24 RX ORDER — GLIPIZIDE 10 MG/1
10 TABLET, FILM COATED, EXTENDED RELEASE ORAL
Qty: 90 TABLET | Refills: 3 | Status: SHIPPED | OUTPATIENT
Start: 2023-02-24 | End: 2023-05-25 | Stop reason: SDUPTHER

## 2023-02-24 RX ORDER — SEMAGLUTIDE 1.34 MG/ML
1 INJECTION, SOLUTION SUBCUTANEOUS
Qty: 4 PEN | Refills: 6 | Status: SHIPPED | OUTPATIENT
Start: 2023-02-24 | End: 2023-04-14

## 2023-02-24 RX ORDER — GLIPIZIDE 10 MG/1
10 TABLET, FILM COATED, EXTENDED RELEASE ORAL
Qty: 90 TABLET | Refills: 3 | Status: SHIPPED | OUTPATIENT
Start: 2023-02-24 | End: 2023-02-24 | Stop reason: SDUPTHER

## 2023-02-24 NOTE — PROGRESS NOTES
Subjective:      Patient ID: Yanni Kaiser is a 52 y.o. female presented to [facility] on 2/24/2023.  Chief Complaint:  Diabetes      History of Present Illness: Yanni Kaiser is a 52 y.o. female here for  Type 2 diabetes  Other significant past medical history:       With regards to Diabetes Mellitus Type 2  - Known diabetic complications: nephropathy and peripheral neuropathy  - Diagnosed w/ DM: in [place] 2014  - Diabetes Education: No  - Patient previous least seen by bariatric medicine:  Dr. Salcedo, was given Ozempic 11/2019  - Patient saw primary care provider 02/2023 with complaints of hyperglycemia. She report that she was on off and on steroid due to asthma leading to hyperglycemia.  Reportedly having to go to emergency room for dehydration was recommended to stay in observation but she left AMA.   Patient has been taking Ozempic and glipizide.  - Patient reports Blood sugar this morning at home was in the 400s but she has had some readings in the last week in the mid 200s.  She notes her urinary frequency and dry mouth are still present but improving.    - patient has history of steroid  use, but now off all steroid  - patient reports multiple 2022, had Covid, PNA, Flu, respiratory failure, requiring hospitalization    Interval history:  Patient is here for diabetes management.  Reports hyperglycemia.  Currently on Ozempic common glipizide.  Glucose on blood work elevated at 296  Glucose is getting better.  Previously >400 last week.   Glucose 191, 187. Report glucose 200 s  She denies eating starch, rice, pasta  Currently not taking steroid, did was on prednisone in the past  In clinic glucose check: 137      Current reported meds:               Ozempic 1mg once a week injection, every Thursday.    Glipizide 5mg once daily  Previous meds tried:              Jardiance  Home glucose checks: checks 2x a day, Logs reviewed/Unavailable: oral recall:  - Hypoglycemia  symptoms:  DENIES   Diet/Exercise:   - Eating *2xmeals per day   - Sleeping: Any difficulty falling asleep, staying asleep or partner complaints of snoring?   - What time do you usually go to bed? What time do you usually wake up?   - Weight trend: stable  - Diabetes Related Hospitalization:  \yes  - Hx of pancreatitis: No, denies  - Family history of diabetes: Yes  - Occupation: disabled    Eye exam current (within one year): no, DR: no, unknown  Reports cuts or ulcers on feet:   Denies, has podiatrist, has podiatrist   Statin: Not taking, ACE/ARB: Taking    Diabetes lab work  Lab Results   Component Value Date    HGBA1C 5.8 (H) 12/20/2022    HGBA1C 6.4 (H) 06/20/2022    HGBA1C 8.7 (H) 03/30/2022    HGBA1C 5.6 08/26/2021     Lab Results   Component Value Date    CPEPTIDE 1.9 02/24/2017    GLUTAMICACID 0.00 02/24/2017    ISLETCELLANT <5 02/24/2017      No results found for: FRUCTOSAMINE  Lab Results   Component Value Date    MICALBCREAT 7.9 06/20/2022     Lab Results   Component Value Date    NSKLENFQ70 439 04/14/2021     Diabetes Management Status: Reviewed this office visit  Screening or Prevention Patient's value Goal Complete/Controlled?   Lipid profile : 06/20/2022 Annually Yes     Dilated retinal exam : 05/30/2022 Annually Yes     Foot exam   : 10/18/2022 Annually Yes        Reviewed past surgical, medical, family, social history and updated as appropriate.  Review of Systems: see HPI above    Objective:   BP (!) 175/106 (BP Location: Right arm)   Pulse 83   Temp 98.5 °F (36.9 °C) (Oral)   Wt (!) 160.8 kg (354 lb 6.4 oz)   LMP 08/17/2019   BMI 60.83 kg/m²   Body mass index is 60.83 kg/m².  Vital signs reviewed    Physical Exam  Vitals and nursing note reviewed.   Constitutional:       Appearance: Normal appearance. She is well-developed. She is obese. She is not ill-appearing.   Neck:      Thyroid: No thyromegaly.   Pulmonary:      Effort: Pulmonary effort is normal. No respiratory distress.    Musculoskeletal:         General: Normal range of motion.      Cervical back: Normal range of motion.   Neurological:      General: No focal deficit present.      Mental Status: She is alert. Mental status is at baseline.   Psychiatric:         Mood and Affect: Mood normal.         Behavior: Behavior normal.       Lab Reviewed:  See results in subjective  Lab Results   Component Value Date    HGBA1C 5.8 (H) 12/20/2022     Lab Results   Component Value Date    CHOL 206 (H) 06/20/2022    HDL 58 06/20/2022    LDLCALC 126.4 06/20/2022    TRIG 108 06/20/2022    CHOLHDL 28.2 06/20/2022     Lab Results   Component Value Date     02/15/2023    K 4.3 02/15/2023     02/15/2023    CO2 25 02/15/2023     (H) 02/15/2023    BUN 10 02/15/2023    CREATININE 1.3 02/15/2023    CALCIUM 8.8 02/15/2023    PHOS 4.1 04/14/2021    PROT 8.3 02/09/2023    ALBUMIN 3.6 02/09/2023    BILITOT 0.5 02/09/2023    ALKPHOS 83 02/09/2023    AST 23 02/09/2023    ALT 28 02/09/2023    ANIONGAP 10 02/15/2023    ESTGFRAFRICA 56 (A) 11/10/2021    EGFRNONAA 48 (A) 11/10/2021    TSH 1.647 04/14/2021    CAROLJND12UC 15 (L) 04/14/2021     Assessment     1. Type 2 diabetes mellitus with hyperglycemia, without long-term current use of insulin  POCT Glucose, Hand-Held Device    semaglutide (OZEMPIC) 1 mg/dose (4 mg/3 mL)    Glucose Monitoring Continuous Min 72 Hours    Hemoglobin A1C    Basic Metabolic Panel    Fructosamine    C-Peptide    glipiZIDE (GLUCOTROL) 10 MG TR24    DISCONTINUED: glipiZIDE (GLUCOTROL) 10 MG TR24      2. Type 2 diabetes mellitus with stage 3 chronic kidney disease, with long-term current use of insulin, unspecified whether stage 3a or 3b CKD  Ambulatory referral/consult to Endocrinology      3. Vitamin D deficiency  Vitamin D      4. Asthma exacerbation in COPD        5. Essential hypertension        6. Morbid obesity           Plan     Type 2 diabetes mellitus with hyperglycemia, without long-term current use of  insulin  - Diabetes is not at goal, given reported hyperglycemia, due to steroid use  - Goal A1C for patient is 7%  - Complicated by hyperglycemia and dietary indiscretion  - Diabetic supplies/medications were reviewed this visit to ensure continue steady supplies  - Advised patient to get routine feet care, routine eye exam, plus routine maintenance screening, reviewed   - Diabetes goal including glucose glucose and A1C goals discussed    Plan  - given hyperglycemia, we will continue Ozempic 1 mg once a week, ensure patient to continue to have refill  - we will increase glipizide to 10 mg daily  - will perform professional CGM prior to next office visit in 3 months to evaluate glucose trend  - Encouragement of dietary modification, portion size control, decreasing carbohydrates intake  - Follow up as scheduled    Asthma exacerbation in COPD  - patient with history of respiratory failure, requiring steroid leading to hyperglycemia    Essential hypertension  - blood pressure review in clinic today  - manage by PCP    Vitamin D deficiency  - Lab work reviewed, and discussed with patient in clinic   - check lab work soon to evaluate      Morbid obesity  - Body mass index is 60.83 kg/m².  - dietary discussion as above  - continue to monitor weight  - on Ozempic  - previously seen bariatric medicine clinic       Advised patient to follow up with PCP for routine health maintenance care.   RTC in 3 months      Gonzales Collins M.D.  Endocrinology  [facility]  2/24/2023      Disclaimer: This note has been generated in part with the use of voice-recognition software. There may be typographical errors that have been missed during proof-reading.

## 2023-02-24 NOTE — ASSESSMENT & PLAN NOTE
- Diabetes is not at goal, given reported hyperglycemia, due to steroid use  - Goal A1C for patient is 7%  - Complicated by hyperglycemia and dietary indiscretion  - Diabetic supplies/medications were reviewed this visit to ensure continue steady supplies  - Advised patient to get routine feet care, routine eye exam, plus routine maintenance screening, reviewed   - Diabetes goal including glucose glucose and A1C goals discussed    Plan  - given hyperglycemia, we will continue Ozempic 1 mg once a week, ensure patient to continue to have refill  - we will increase glipizide to 10 mg daily  - will perform professional CGM prior to next office visit in 3 months to evaluate glucose trend  - Encouragement of dietary modification, portion size control, decreasing carbohydrates intake  - Follow up as scheduled

## 2023-02-24 NOTE — ASSESSMENT & PLAN NOTE
- Body mass index is 60.83 kg/m².  - dietary discussion as above  - continue to monitor weight  - on Ozempic  - previously seen bariatric medicine clinic

## 2023-03-01 ENCOUNTER — TELEPHONE (OUTPATIENT)
Dept: FAMILY MEDICINE | Facility: CLINIC | Age: 53
End: 2023-03-01
Payer: MEDICARE

## 2023-03-02 ENCOUNTER — OFFICE VISIT (OUTPATIENT)
Dept: FAMILY MEDICINE | Facility: CLINIC | Age: 53
End: 2023-03-02
Payer: MEDICARE

## 2023-03-02 VITALS
OXYGEN SATURATION: 98 % | BODY MASS INDEX: 50.02 KG/M2 | SYSTOLIC BLOOD PRESSURE: 126 MMHG | TEMPERATURE: 98 F | WEIGHT: 293 LBS | HEART RATE: 85 BPM | HEIGHT: 64 IN | RESPIRATION RATE: 16 BRPM | DIASTOLIC BLOOD PRESSURE: 80 MMHG

## 2023-03-02 DIAGNOSIS — N18.30 STAGE 3 CHRONIC KIDNEY DISEASE, UNSPECIFIED WHETHER STAGE 3A OR 3B CKD: Chronic | ICD-10-CM

## 2023-03-02 DIAGNOSIS — I10 ESSENTIAL HYPERTENSION: Primary | Chronic | ICD-10-CM

## 2023-03-02 DIAGNOSIS — R60.0 BILATERAL LOWER EXTREMITY EDEMA: ICD-10-CM

## 2023-03-02 DIAGNOSIS — D17.24 BENIGN LIPOMATOUS NEOPLASM OF SKIN AND SUBCUTANEOUS TISSUE OF LEFT LEG: ICD-10-CM

## 2023-03-02 PROCEDURE — 99999 PR PBB SHADOW E&M-EST. PATIENT-LVL V: ICD-10-PCS | Mod: PBBFAC,HCNC,, | Performed by: NURSE PRACTITIONER

## 2023-03-02 PROCEDURE — 3008F PR BODY MASS INDEX (BMI) DOCUMENTED: ICD-10-PCS | Mod: HCNC,CPTII,S$GLB, | Performed by: NURSE PRACTITIONER

## 2023-03-02 PROCEDURE — 4010F ACE/ARB THERAPY RXD/TAKEN: CPT | Mod: HCNC,CPTII,S$GLB, | Performed by: NURSE PRACTITIONER

## 2023-03-02 PROCEDURE — 3074F SYST BP LT 130 MM HG: CPT | Mod: HCNC,CPTII,S$GLB, | Performed by: NURSE PRACTITIONER

## 2023-03-02 PROCEDURE — 3079F DIAST BP 80-89 MM HG: CPT | Mod: HCNC,CPTII,S$GLB, | Performed by: NURSE PRACTITIONER

## 2023-03-02 PROCEDURE — 3072F LOW RISK FOR RETINOPATHY: CPT | Mod: HCNC,CPTII,S$GLB, | Performed by: NURSE PRACTITIONER

## 2023-03-02 PROCEDURE — 1160F RVW MEDS BY RX/DR IN RCRD: CPT | Mod: HCNC,CPTII,S$GLB, | Performed by: NURSE PRACTITIONER

## 2023-03-02 PROCEDURE — 4010F PR ACE/ARB THEARPY RXD/TAKEN: ICD-10-PCS | Mod: HCNC,CPTII,S$GLB, | Performed by: NURSE PRACTITIONER

## 2023-03-02 PROCEDURE — 3072F PR LOW RISK FOR RETINOPATHY: ICD-10-PCS | Mod: HCNC,CPTII,S$GLB, | Performed by: NURSE PRACTITIONER

## 2023-03-02 PROCEDURE — 3074F PR MOST RECENT SYSTOLIC BLOOD PRESSURE < 130 MM HG: ICD-10-PCS | Mod: HCNC,CPTII,S$GLB, | Performed by: NURSE PRACTITIONER

## 2023-03-02 PROCEDURE — 99214 PR OFFICE/OUTPT VISIT, EST, LEVL IV, 30-39 MIN: ICD-10-PCS | Mod: HCNC,S$GLB,, | Performed by: NURSE PRACTITIONER

## 2023-03-02 PROCEDURE — 99999 PR PBB SHADOW E&M-EST. PATIENT-LVL V: CPT | Mod: PBBFAC,HCNC,, | Performed by: NURSE PRACTITIONER

## 2023-03-02 PROCEDURE — 1159F PR MEDICATION LIST DOCUMENTED IN MEDICAL RECORD: ICD-10-PCS | Mod: HCNC,CPTII,S$GLB, | Performed by: NURSE PRACTITIONER

## 2023-03-02 PROCEDURE — 99214 OFFICE O/P EST MOD 30 MIN: CPT | Mod: HCNC,S$GLB,, | Performed by: NURSE PRACTITIONER

## 2023-03-02 PROCEDURE — 3079F PR MOST RECENT DIASTOLIC BLOOD PRESSURE 80-89 MM HG: ICD-10-PCS | Mod: HCNC,CPTII,S$GLB, | Performed by: NURSE PRACTITIONER

## 2023-03-02 PROCEDURE — 1159F MED LIST DOCD IN RCRD: CPT | Mod: HCNC,CPTII,S$GLB, | Performed by: NURSE PRACTITIONER

## 2023-03-02 PROCEDURE — 3008F BODY MASS INDEX DOCD: CPT | Mod: HCNC,CPTII,S$GLB, | Performed by: NURSE PRACTITIONER

## 2023-03-02 PROCEDURE — 1160F PR REVIEW ALL MEDS BY PRESCRIBER/CLIN PHARMACIST DOCUMENTED: ICD-10-PCS | Mod: HCNC,CPTII,S$GLB, | Performed by: NURSE PRACTITIONER

## 2023-03-02 RX ORDER — LOSARTAN POTASSIUM 100 MG/1
100 TABLET ORAL DAILY
Qty: 90 TABLET | Refills: 0 | Status: SHIPPED | OUTPATIENT
Start: 2023-03-02 | End: 2024-03-01

## 2023-03-02 RX ORDER — HYDROCHLOROTHIAZIDE 12.5 MG/1
TABLET ORAL
Qty: 30 TABLET | Refills: 2 | Status: SHIPPED | OUTPATIENT
Start: 2023-03-02

## 2023-03-02 NOTE — PROGRESS NOTES
Routine Office Visit    Patient Name: Yanni Kaiser    : 1970  MRN: [id number]    Chief Complaint:  Blood pressure follow-up, leg swelling    Subjective:  Yanni is a 52 y.o. female who presents today for:    1. Blood pressure follow-up, leg swelling - patient who is known to me with a history of obesity, CKD, diabetes, hypertension reports today for evaluation.  Since our last visit she has seen endocrinology and is continued on Ozempic and glipizide.  In the last week her sugars have been well controlled with most readings in the 120s.  This morning her sugar was 106.  She has stopped her losartan HCTZ because hydrochlorothiazide component did cause too much urinary frequency which has resolved since stopping the medication.  She would like to be restarted on another blood pressure medication and potentially an as needed medication for leg swelling if possible.  Otherwise she denies any new or acute problems or concerns.  She does have a lipoma on her left leg demonstrated on recent ultrasound which is sometimes painful.    Past Medical History  Past Medical History:   Diagnosis Date    Allergy     Anemia     Anxiety     Asthma     Back pain     Chronic bronchitis     Chronic obstructive pulmonary disease, unspecified COPD type 2022    Cigarette smoker     DDD (degenerative disc disease), lumbar 2020    Depression     Diabetes mellitus with peripheral circulatory disorder 2022    Diabetes with neurologic complications     DUB (dysfunctional uterine bleeding) 10/16/2018    GERD (gastroesophageal reflux disease)     High cholesterol     Hyperprolactinemia     Hypertension     Influenza A 2020    Neuromuscular disorder     Obese body habitus     Obesity     Pseudotumor cerebri     Renal manifestation of secondary diabetes mellitus     Respiratory failure     Seizures     Simple endometrial hyperplasia     Sleep apnea     Smoker     Tobacco dependence     Urinary incontinence         Past Surgical History  Past Surgical History:   Procedure Laterality Date    ANTROSTOMY OF MAXILLARY SINUS AT INFERIOR NASAL MEATUS  10/22/2021    Procedure: MAXILLARY ANTROSTOMY, AT INFERIOR NASAL MEATUS;  Surgeon: John Prado III, MD;  Location: [facility] OR;  Service: ENT;;    BREAST CYST ASPIRATION       SECTION      X 1    CHOLECYSTECTOMY      COLONOSCOPY      COLONOSCOPY N/A 2019    Procedure: COLONOSCOPY;  Surgeon: Jagruti Sweeney MD;  [facility]  Service: Endoscopy;  Laterality: N/A;  BMI is 63/gastroparesis/3 days full liquid diet 1 day clear liquid see telephone encounter by [facility]    EPIDURAL STEROID INJECTION N/A 2020    Procedure: INJECTION, STEROID, EPIDURAL, L5-S1 IL;  Surgeon: Lawrence Marin MD;  Location: [facility] PAIN MGT;  Service: Pain Management;  Laterality: N/A;    ESOPHAGEAL MANOMETRY WITH MEASUREMENT OF IMPEDANCE N/A 2019    Procedure: MANOMETRY-ESOPHAGEAL-WITH IMPEDANCE;  Surgeon: Jagruti Sweeney MD;  Location: [facility] ([address] [place] [address]);  Service: Endoscopy;  Laterality: N/A;  Hold Narcotics x 1 days   Hold TCA x 1 days  Propofol only. No fentanyl or benzodiazepine during sedation. If additional sedation needed, discuss with Dr. Sweeney.  10/29 - pt confirmed appt    ESOPHAGOGASTRODUODENOSCOPY N/A 2019    Procedure: EGD (ESOPHAGOGASTRODUODENOSCOPY);  Surgeon: Jagruti Sweeney MD;  Location: [facility] ([address] [place] [address]);  Service: Endoscopy;  Laterality: N/A;  BMI is 63/gastroparesis/3 days full liquid diet 1 day clear liquid see telephone encounter by Ciara Brown     ESOPHAGOGASTRODUODENOSCOPY N/A 2019    Procedure: ESOPHAGOGASTRODUODENOSCOPY (EGD);  Surgeon: Jagruti Sweeney MD;  [facility]  Service: Endoscopy;  Laterality: N/A;  EGD with EndoFlip /+/- Botox  2nd floor for gastroparesis/BMI 63 **367lbs**  Bariatric Stretcher needed  Manometry probe to be placed endoscopically during EGD procedure  Due  to change in protocol, Full liquid diet for 3 days/ 1 day of clear liquids  Hi    ESOPHAGOGASTRODUODENOSCOPY N/A 12/14/2020    Procedure: ESOPHAGOGASTRODUODENOSCOPY (EGD) with BRAVO;  Surgeon: Jagruti Sweeney MD;  Location: [facility] ENDO (2ND FLR);  Service: Endoscopy;  Laterality: N/A;  with Dilation  2nd floor due to BMI 56.20 (327 lbs) and gastroparesis  3 days full liquid diet and 1 day clears    ESOPHAGOGASTRODUODENOSCOPY N/A 04/15/2021    Procedure: ESOPHAGOGASTRODUODENOSCOPY (EGD);  Surgeon: Jagruti Sweeney MD;  Location: [facility] ENDO (2ND FLR);  Service: Endoscopy;  Laterality: N/A;  Endoflip  2nd floor-gastroparesis, BMI 57.18, bariatric stretcher  full liquid diet x3 days, clear liquid diet x1 day prior to procedure  propofol only  covid test 4/12 primary care, instructions emailed-[facility] [place]    FOOT FRACTURE SURGERY Left     FUNCTIONAL ENDOSCOPIC SINUS SURGERY (FESS) USING COMPUTER-ASSISTED NAVIGATION Bilateral 10/22/2021    Procedure: FESS, USING COMPUTER-ASSISTED NAVIGATION;  Surgeon: John Prado III, MD;  Location: [facility];  Service: ENT;  Laterality: Bilateral;  FOLLOW DR PRADO ANESTHESIA PROTOCAL / pt will stay 23 hour post surgery for SHARATH observation    HYSTEROSCOPY WITH DILATION AND CURETTAGE OF UTERUS N/A 10/16/2018    KJB    INJECTION OF ANESTHETIC AGENT AROUND NERVE Bilateral 10/23/2020    Procedure: BLOCK, NERVE  bilateral L3,4,5 MBB;  Surgeon: Lawrence Marin MD;  Location: [facility] PAIN MGT;  Service: Pain Management;  Laterality: Bilateral;  bilateral L3,4,5 MBB   consent needed    INJECTION OF ANESTHETIC AGENT AROUND NERVE Bilateral 02/18/2022    Procedure: BLOCK, NERVE, L3-L4-L5 MEDIAL BRANCH;  Surgeon: Lawrence Marin MD;  Location: [facility] PAIN MGT;  Service: Pain Management;  Laterality: Bilateral;    PLANTAR FASCIOTOMY Left 11/18/2019    Procedure: FASCIOTOMY, PLANTAR;  Surgeon: Valentino Orourke DPM;  Location: [facility] 2ND FLR;  Service: Podiatry;  Laterality: Left;    RETINAL LASER PROCEDURE  Left     SINUS SURGERY      SPHENOIDECTOMY Bilateral 10/22/2021    Procedure: SPHENOIDECTOMY;  Surgeon: John Prado III, MD;  Location: [facility] OR;  Service: ENT;  Laterality: Bilateral;    TRANSFORAMINAL EPIDURAL INJECTION OF STEROID Bilateral 06/24/2020    Procedure: INJECTION, STEROID, EPIDURAL, TRANSFORAMINAL APPROACH;  Surgeon: Lawrence Marin MD;  Location: [facility] PAIN MGT;  Service: Pain Management;  Laterality: Bilateral;    TRANSFORAMINAL EPIDURAL INJECTION OF STEROID Bilateral 01/28/2022    Procedure: INJECTION, STEROID, EPIDURAL, TRANSFORAMINAL APPROACH  Bilateral TFESI L4/L5      NEEDS CONSENT;  Surgeon: Lawrence Marin MD;  Location: [facility] PAIN MGT;  Service: Pain Management;  Laterality: Bilateral;    TRANSFORAMINAL EPIDURAL INJECTION OF STEROID Bilateral 12/23/2022    Procedure: INJECTION, STEROID, EPIDURAL, TRANSFORAMINAL APPROACH BILATERAL L4/L5 CONTRAST  NEEDS CONSENT;  Surgeon: Lawrence Marin MD;  Location: [facility] PAIN MGT;  Service: Pain Management;  Laterality: Bilateral;    UPPER GASTROINTESTINAL ENDOSCOPY         Family History  Family History   Problem Relation Age of Onset    Hypertension Mother     Diabetes Mother     Colon cancer Mother 50    Colon polyps Mother     Cataracts Mother     Heart disease Father     COPD Father     Heart attack Father     Hypertension Father     Ulcers Father     Cataracts Father     Glaucoma Father     No Known Problems Brother     Diabetes Daughter         prediabetes    Diabetes Daughter         prediabetic    Hypertension Daughter     Obesity Daughter     No Known Problems Daughter     No Known Problems Son     No Known Problems Maternal Aunt     No Known Problems Maternal Uncle     No Known Problems Paternal Aunt     No Known Problems Paternal Uncle     Cancer Maternal Grandmother     Breast cancer Maternal Grandmother     Colon cancer Maternal Grandmother     Colon polyps Maternal Grandmother     No Known Problems Maternal Grandfather     No Known  "Problems Paternal Grandmother     No Known Problems Paternal Grandfather     Celiac disease Neg Hx     Cirrhosis Neg Hx     Crohn's disease Neg Hx     Cystic fibrosis Neg Hx     Esophageal cancer Neg Hx     Hemochromatosis Neg Hx     Inflammatory bowel disease Neg Hx     Irritable bowel syndrome Neg Hx     Liver cancer Neg Hx     Liver disease Neg Hx     Rectal cancer Neg Hx     Stomach cancer Neg Hx     Ulcerative colitis Neg Hx     Jonnie's disease Neg Hx     Tuberculosis Neg Hx     Lymphoma Neg Hx     Scleroderma Neg Hx     Rheum arthritis Neg Hx     Melanoma Neg Hx     Multiple sclerosis Neg Hx     Psoriasis Neg Hx     Lupus Neg Hx     Skin cancer Neg Hx     Amblyopia Neg Hx     Blindness Neg Hx     Macular degeneration Neg Hx     Retinal detachment Neg Hx     Strabismus Neg Hx     Stroke Neg Hx     Thyroid disease Neg Hx     Allergic rhinitis Neg Hx     Allergies Neg Hx     Angioedema Neg Hx     Asthma Neg Hx     Eczema Neg Hx     Immunodeficiency Neg Hx     Rhinitis Neg Hx     Urticaria Neg Hx     Atopy Neg Hx        Social History  Social History     Socioeconomic History    Marital status:     Number of children: 4   Occupational History    Occupation: disable   Tobacco Use    Smoking status: Every Day     Packs/day: 0.25     Years: 27.00     Pack years: 6.75     Types: Cigarettes     Start date: 1/1/1992     Passive exposure: Current    Smokeless tobacco: Never    Tobacco comments:     "1/2 pack per every 2 days or so"           10/21/22 patient says its less than half a pack now. Has been cutting back.   Substance and Sexual Activity    Alcohol use: Yes     Alcohol/week: 1.0 standard drink     Types: 1 Shots of liquor per week     Comment: occasionally    Drug use: No    Sexual activity: Yes     Partners: Male     Birth control/protection: None     Comment:      Social Determinants of Health     Financial Resource Strain: Medium Risk    Difficulty of Paying Living Expenses: Somewhat hard "   Food Insecurity: Food Insecurity Present    Worried About Running Out of Food in the Last Year: Sometimes true    Ran Out of Food in the Last Year: Sometimes true   Transportation Needs: Unmet Transportation Needs    Lack of Transportation (Medical): Yes    Lack of Transportation (Non-Medical): Yes   Physical Activity: Inactive    Days of Exercise per Week: 0 days    Minutes of Exercise per Session: 0 min   Stress: Stress Concern Present    Feeling of Stress : To some extent   Social Connections: Unknown    Frequency of Communication with Friends and Family: Three times a week    Frequency of Social Gatherings with Friends and Family: Once a week    Active Member of Clubs or Organizations: Yes    Attends Club or Organization Meetings: More than 4 times per year    Marital Status:    Housing Stability: High Risk    Unable to Pay for Housing in the Last Year: Yes    Number of Places Lived in the Last Year: 1    Unstable Housing in the Last Year: No       Current Medications  Current Outpatient Medications on File Prior to Visit   Medication Sig Dispense Refill    albuterol (PROVENTIL/VENTOLIN HFA) 90 mcg/actuation inhaler Inhale 2 puffs into the lungs every 6 (six) hours as needed for Wheezing or Shortness of Breath. Rescue 54 g 6    albuterol-ipratropium (DUO-NEB) 2.5 mg-0.5 mg/3 mL nebulizer solution Take 3 mLs by nebulization every 6 (six) hours as needed for Wheezing. Rescue 75 mL 2    amitriptyline (ELAVIL) 25 MG tablet Take 1 tablet (25 mg total) by mouth nightly as needed for Insomnia. 90 tablet 1    azelastine (ASTELIN) 137 mcg (0.1 %) nasal spray 2 sprays (274 mcg total) by Nasal route 2 (two) times daily. 60 mL 11    blood sugar diagnostic (BLOOD GLUCOSE TEST) Strp 1 strip by Misc.(Non-Drug; Combo Route) route 2 (two) times a day. Insurance covers 100 each 11    blood-glucose meter kit Insurance covers 1 each 0    budesonide-formoterol 160-4.5 mcg (SYMBICORT) 160-4.5 mcg/actuation HFAA INHALE 2  PUFFS INTO THE LUNGS EVERY 12 HOURS 30.6 g 3    cetirizine (ZYRTEC) 10 MG tablet Take 1 tablet (10 mg total) by mouth daily as needed for Allergies (itching). 90 tablet 1    cyclobenzaprine (FLEXERIL) 10 MG tablet 10 mg 3 (three) times daily as needed.      diclofenac sodium (VOLTAREN) 1 % Gel Apply 2 g topically 3 (three) times daily. Apply to painful joints 300 g 2    fluticasone propionate (FLONASE) 50 mcg/actuation nasal spray 2 sprays (100 mcg total) by Each Nostril route 2 (two) times a day. 32 g 11    glipiZIDE (GLUCOTROL) 10 MG TR24 Take 1 tablet (10 mg total) by mouth daily with breakfast. 90 tablet 3    levocetirizine (XYZAL) 5 MG tablet Take 1 tablet (5 mg total) by mouth every evening. 90 tablet 1    LIDOcaine HCL 2% (XYLOCAINE) 2 % jelly Apply topically as needed. Apply topically once nightly to affected part of foot/feet. 30 mL 2    LINZESS 145 mcg Cap capsule Take 1 capsule (145 mcg total) by mouth once daily. 90 capsule 1    montelukast (SINGULAIR) 10 mg tablet Take 1 tablet (10 mg total) by mouth every evening. 90 tablet 1    nicotine (NICODERM CQ) 21 mg/24 hr Place 1 patch onto the skin once daily. 28 patch 0    ondansetron (ZOFRAN) 8 MG tablet Take 1 tablet (8 mg total) by mouth every 8 (eight) hours as needed for Nausea. 90 tablet 11    oxyCODONE-acetaminophen (PERCOCET) 7.5-325 mg per tablet Take 1 tablet by mouth 3 (three) times daily as needed for Pain. 90 tablet 0    pantoprazole (PROTONIX) 40 MG tablet Take 1 tablet (40 mg total) by mouth 2 (two) times daily. 180 tablet 1    pregabalin (LYRICA) 50 MG capsule Take 1 capsule (50 mg total) by mouth 3 (three) times daily. 90 capsule 3    QUEtiapine (SEROQUEL) 25 MG Tab Take 1 tablet (25 mg total) by mouth once daily. 90 tablet 1    semaglutide (OZEMPIC) 1 mg/dose (4 mg/3 mL) Inject 1 mg into the skin every 7 days. 4 pen 6    sucralfate (CARAFATE) 100 mg/mL suspension Take 1 g by mouth 3 (three) times daily.      sulindac (CLINORIL) 200 MG Tab  Take 1 tablet (200 mg total) by mouth 2 (two) times daily. 30 tablet 0    topiramate (TOPAMAX) 100 MG tablet Take 1.5 tablets (150 mg total) by mouth every evening. 180 tablet 1    triamcinolone acetonide 0.1% (KENALOG) 0.1 % cream Apply topically 2 (two) times daily. Prn focally for rash spots on forehead and legs.Stop using steroid topical when skin is smooth and non itchy.  Do not treat dark or red coloring. 45 g 0    venlafaxine (EFFEXOR-XR) 75 MG 24 hr capsule Take 1 capsule (75 mg total) by mouth once daily. 90 capsule 1    [DISCONTINUED] losartan-hydrochlorothiazide 100-25 mg (HYZAAR) 100-25 mg per tablet Take 1 tablet by mouth once daily. 90 tablet 3    ammonium lactate 12 % Crea Apply twice daily to affected parts both feet as needed. (Patient not taking: Reported on 3/2/2023) 140 g 11    clobetasoL (TEMOVATE) 0.05 % external solution Apply topically 2 (two) times daily. Prn scalp itch or rash (Patient not taking: Reported on 3/2/2023) 60 mL 1    nystatin-triamcinolone (MYCOLOG) ointment Apply topically 2 (two) times daily. (Patient not taking: Reported on 3/2/2023) 60 g 2     Current Facility-Administered Medications on File Prior to Visit   Medication Dose Route Frequency Provider Last Rate Last Admin    albuterol inhaler 2 puff  2 puff Inhalation Q20 Min PRN Carlyle Gordillo MD        EPINEPHrine (EPIPEN) 0.3 mg/0.3 mL pen injection 0.3 mg  0.3 mg Intramuscular PRN Carlyle Gordillo MD           Allergies   Review of patient's allergies indicates:   Allergen Reactions    Gabapentin Other (See Comments)     Makes her twitch       Review of Systems (Pertinent positives)  Review of Systems   Constitutional: Negative.  Negative for chills and fever.   HENT: Negative.  Negative for congestion, sinus pain and sore throat.    Eyes: Negative.    Respiratory:  Negative for cough, shortness of breath and wheezing.    Cardiovascular:  Positive for leg swelling. Negative for chest pain, palpitations, orthopnea and  "claudication.   Gastrointestinal: Negative.  Negative for abdominal pain, diarrhea, nausea and vomiting.   Genitourinary: Negative.  Negative for dysuria, frequency and urgency.   Musculoskeletal: Negative.  Negative for back pain, joint pain and neck pain.   Skin: Negative.    Neurological: Negative.  Negative for dizziness, tingling, loss of consciousness and headaches.   Endo/Heme/Allergies: Negative.    Psychiatric/Behavioral: Negative.       /80 (BP Location: Left arm, Patient Position: Sitting, BP Method: X-Large (Manual))   Pulse 85   Temp 98 °F (36.7 °C) (Oral)   Resp 16   Ht 5' 4" (1.626 m)   Wt (!) 161.2 kg (355 lb 6.1 oz)   LMP 08/17/2019   SpO2 98%   BMI 61.00 kg/m²     Physical Exam  Vitals reviewed.   Constitutional:       General: She is not in acute distress.     Appearance: Normal appearance. She is not ill-appearing, toxic-appearing or diaphoretic.   HENT:      Head: Normocephalic and atraumatic.   Cardiovascular:      Rate and Rhythm: Normal rate and regular rhythm.      Pulses: Normal pulses.      Heart sounds: Normal heart sounds.      Comments: Trace edema BLE  Pulmonary:      Effort: Pulmonary effort is normal. No respiratory distress.      Breath sounds: Normal breath sounds. No wheezing.   Abdominal:      General: Bowel sounds are normal. There is no distension.      Palpations: Abdomen is soft.      Tenderness: There is no abdominal tenderness.   Musculoskeletal:         General: No tenderness or deformity. Normal range of motion.   Skin:     General: Skin is warm and dry.      Capillary Refill: Capillary refill takes less than 2 seconds.      Comments: No obvious mass noted LLE   Neurological:      Mental Status: She is alert and oriented to person, place, and time.   Psychiatric:         Mood and Affect: Mood normal.         Behavior: Behavior normal.        Assessment/Plan:  Yanni Kaiser is a 52 y.o. female who presents today for :    Yanni was seen today for " hypertension and medication problem.    Diagnoses and all orders for this visit:    Essential hypertension  -     losartan (COZAAR) 100 MG tablet; Take 1 tablet (100 mg total) by mouth once daily.  -     BASIC METABOLIC PANEL; Future    Stage 3 chronic kidney disease, unspecified whether stage 3a or 3b CKD  -     losartan (COZAAR) 100 MG tablet; Take 1 tablet (100 mg total) by mouth once daily.  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  Recheck BP in 1 month with labs as well.  Her blood sugars are doing better.  Continue follow-up with endocrinology.  As for the lipoma, we discussed potential surgical removal patient declines.  Recommended patient to monitor this area for now and if any worsening we can consult General surgery.  All questions answered.        This office note has been dictated.  This dictation has been generated using M-Modal Fluency Direct dictation; some phonetic errors may occur.   My collaborating physician is Dr. Jose Rausch.

## 2023-03-08 ENCOUNTER — PATIENT MESSAGE (OUTPATIENT)
Dept: BARIATRICS | Facility: CLINIC | Age: 53
End: 2023-03-08
Payer: MEDICARE

## 2023-03-14 ENCOUNTER — PATIENT MESSAGE (OUTPATIENT)
Dept: BARIATRICS | Facility: CLINIC | Age: 53
End: 2023-03-14

## 2023-03-24 ENCOUNTER — OFFICE VISIT (OUTPATIENT)
Dept: OTOLARYNGOLOGY | Facility: CLINIC | Age: 53
End: 2023-03-24
Payer: MEDICARE

## 2023-03-24 VITALS
BODY MASS INDEX: 50.02 KG/M2 | WEIGHT: 293 LBS | DIASTOLIC BLOOD PRESSURE: 79 MMHG | HEIGHT: 64 IN | SYSTOLIC BLOOD PRESSURE: 134 MMHG | HEART RATE: 98 BPM

## 2023-03-24 DIAGNOSIS — Z98.890 S/P FESS (FUNCTIONAL ENDOSCOPIC SINUS SURGERY): Primary | ICD-10-CM

## 2023-03-24 PROCEDURE — 1160F PR REVIEW ALL MEDS BY PRESCRIBER/CLIN PHARMACIST DOCUMENTED: ICD-10-PCS | Mod: CPTII,S$GLB,, | Performed by: OTOLARYNGOLOGY

## 2023-03-24 PROCEDURE — 1159F MED LIST DOCD IN RCRD: CPT | Mod: CPTII,S$GLB,, | Performed by: OTOLARYNGOLOGY

## 2023-03-24 PROCEDURE — 3075F PR MOST RECENT SYSTOLIC BLOOD PRESS GE 130-139MM HG: ICD-10-PCS | Mod: CPTII,S$GLB,, | Performed by: OTOLARYNGOLOGY

## 2023-03-24 PROCEDURE — 3075F SYST BP GE 130 - 139MM HG: CPT | Mod: CPTII,S$GLB,, | Performed by: OTOLARYNGOLOGY

## 2023-03-24 PROCEDURE — 3008F BODY MASS INDEX DOCD: CPT | Mod: CPTII,S$GLB,, | Performed by: OTOLARYNGOLOGY

## 2023-03-24 PROCEDURE — 3008F PR BODY MASS INDEX (BMI) DOCUMENTED: ICD-10-PCS | Mod: CPTII,S$GLB,, | Performed by: OTOLARYNGOLOGY

## 2023-03-24 PROCEDURE — 1160F RVW MEDS BY RX/DR IN RCRD: CPT | Mod: CPTII,S$GLB,, | Performed by: OTOLARYNGOLOGY

## 2023-03-24 PROCEDURE — 99213 PR OFFICE/OUTPT VISIT, EST, LEVL III, 20-29 MIN: ICD-10-PCS | Mod: 25,S$GLB,, | Performed by: OTOLARYNGOLOGY

## 2023-03-24 PROCEDURE — 99213 OFFICE O/P EST LOW 20 MIN: CPT | Mod: 25,S$GLB,, | Performed by: OTOLARYNGOLOGY

## 2023-03-24 PROCEDURE — 3072F PR LOW RISK FOR RETINOPATHY: ICD-10-PCS | Mod: CPTII,S$GLB,, | Performed by: OTOLARYNGOLOGY

## 2023-03-24 PROCEDURE — 31231 NASAL ENDOSCOPY DX: CPT | Mod: S$GLB,,, | Performed by: OTOLARYNGOLOGY

## 2023-03-24 PROCEDURE — 4010F PR ACE/ARB THEARPY RXD/TAKEN: ICD-10-PCS | Mod: CPTII,S$GLB,, | Performed by: OTOLARYNGOLOGY

## 2023-03-24 PROCEDURE — 31231 PR NASAL ENDOSCOPY, DX: ICD-10-PCS | Mod: S$GLB,,, | Performed by: OTOLARYNGOLOGY

## 2023-03-24 PROCEDURE — 3078F DIAST BP <80 MM HG: CPT | Mod: CPTII,S$GLB,, | Performed by: OTOLARYNGOLOGY

## 2023-03-24 PROCEDURE — 3072F LOW RISK FOR RETINOPATHY: CPT | Mod: CPTII,S$GLB,, | Performed by: OTOLARYNGOLOGY

## 2023-03-24 PROCEDURE — 4010F ACE/ARB THERAPY RXD/TAKEN: CPT | Mod: CPTII,S$GLB,, | Performed by: OTOLARYNGOLOGY

## 2023-03-24 PROCEDURE — 3078F PR MOST RECENT DIASTOLIC BLOOD PRESSURE < 80 MM HG: ICD-10-PCS | Mod: CPTII,S$GLB,, | Performed by: OTOLARYNGOLOGY

## 2023-03-24 PROCEDURE — 1159F PR MEDICATION LIST DOCUMENTED IN MEDICAL RECORD: ICD-10-PCS | Mod: CPTII,S$GLB,, | Performed by: OTOLARYNGOLOGY

## 2023-03-24 NOTE — PROGRESS NOTES
1-1/2 years S/P [employer] FESS with bilateral maxillaries and sphenoids,SMR turbs.  She has complaints of some pressure pain in the right ethmoid and frontal region and admits that she discontinued her rinses several months ago.  She has had issues over the last several months with positive COVID infection as well as flu and difficulty maintaining her glucose levels which have just now been resolved.  Examination with 0 degree scope number 100D8D shows her septum to be straight, ostia clean and airway open.  Plan:  Resume budesonide rinses which we will reorder.  Saline spray/gel  RTC  1 year for sinus follow-up or p.r.n. sooner

## 2023-04-03 ENCOUNTER — LAB VISIT (OUTPATIENT)
Dept: LAB | Facility: HOSPITAL | Age: 53
End: 2023-04-03
Attending: NURSE PRACTITIONER
Payer: MEDICARE

## 2023-04-03 ENCOUNTER — DOCUMENTATION ONLY (OUTPATIENT)
Dept: BARIATRICS | Facility: CLINIC | Age: 53
End: 2023-04-03
Payer: MEDICARE

## 2023-04-03 ENCOUNTER — CLINICAL SUPPORT (OUTPATIENT)
Dept: FAMILY MEDICINE | Facility: CLINIC | Age: 53
End: 2023-04-03
Payer: MEDICARE

## 2023-04-03 VITALS — SYSTOLIC BLOOD PRESSURE: 134 MMHG | HEART RATE: 80 BPM | DIASTOLIC BLOOD PRESSURE: 88 MMHG | OXYGEN SATURATION: 99 %

## 2023-04-03 DIAGNOSIS — N18.30 STAGE 3 CHRONIC KIDNEY DISEASE, UNSPECIFIED WHETHER STAGE 3A OR 3B CKD: Chronic | ICD-10-CM

## 2023-04-03 DIAGNOSIS — Z01.30 BLOOD PRESSURE CHECK: Primary | ICD-10-CM

## 2023-04-03 DIAGNOSIS — I10 ESSENTIAL HYPERTENSION: Chronic | ICD-10-CM

## 2023-04-03 LAB
ANION GAP SERPL CALC-SCNC: 8 MMOL/L (ref 8–16)
BUN SERPL-MCNC: 14 MG/DL (ref 6–20)
CALCIUM SERPL-MCNC: 9 MG/DL (ref 8.7–10.5)
CHLORIDE SERPL-SCNC: 110 MMOL/L (ref 95–110)
CO2 SERPL-SCNC: 27 MMOL/L (ref 23–29)
CREAT SERPL-MCNC: 1.2 MG/DL (ref 0.5–1.4)
EST. GFR  (NO RACE VARIABLE): 55 ML/MIN/1.73 M^2
GLUCOSE SERPL-MCNC: 74 MG/DL (ref 70–110)
POTASSIUM SERPL-SCNC: 4.4 MMOL/L (ref 3.5–5.1)
SODIUM SERPL-SCNC: 145 MMOL/L (ref 136–145)

## 2023-04-03 PROCEDURE — 99499 UNLISTED E&M SERVICE: CPT | Mod: HCNC,S$GLB,, | Performed by: NURSE PRACTITIONER

## 2023-04-03 PROCEDURE — 80048 BASIC METABOLIC PNL TOTAL CA: CPT | Mod: HCNC | Performed by: NURSE PRACTITIONER

## 2023-04-03 PROCEDURE — 99999 PR PBB SHADOW E&M-EST. PATIENT-LVL I: CPT | Mod: PBBFAC,HCNC,,

## 2023-04-03 PROCEDURE — 99499 NO LOS: ICD-10-PCS | Mod: HCNC,S$GLB,, | Performed by: NURSE PRACTITIONER

## 2023-04-03 PROCEDURE — 99999 PR PBB SHADOW E&M-EST. PATIENT-LVL I: ICD-10-PCS | Mod: PBBFAC,HCNC,,

## 2023-04-03 PROCEDURE — 36415 COLL VENOUS BLD VENIPUNCTURE: CPT | Mod: HCNC,PN | Performed by: NURSE PRACTITIONER

## 2023-04-03 NOTE — PROGRESS NOTES
Yanni Kaiser 52 y.o. female is here today for Blood Pressure check.   History of HTN yes.    Review of patient's allergies indicates:   Allergen Reactions    Gabapentin Other (See Comments)     Makes her twitch     Creatinine   Date Value Ref Range Status   02/15/2023 1.3 0.5 - 1.4 mg/dL Final     Sodium   Date Value Ref Range Status   02/15/2023 141 136 - 145 mmol/L Final     Potassium   Date Value Ref Range Status   02/15/2023 4.3 3.5 - 5.1 mmol/L Final   ]  Patient verifies taking blood pressure medications on a regular basis at the same time of the day.     Current Outpatient Medications:     albuterol (PROVENTIL/VENTOLIN HFA) 90 mcg/actuation inhaler, Inhale 2 puffs into the lungs every 6 (six) hours as needed for Wheezing or Shortness of Breath. Rescue, Disp: 54 g, Rfl: 6    albuterol-ipratropium (DUO-NEB) 2.5 mg-0.5 mg/3 mL nebulizer solution, Take 3 mLs by nebulization every 6 (six) hours as needed for Wheezing. Rescue, Disp: 75 mL, Rfl: 2    amitriptyline (ELAVIL) 25 MG tablet, Take 1 tablet (25 mg total) by mouth nightly as needed for Insomnia., Disp: 90 tablet, Rfl: 1    ammonium lactate 12 % Crea, Apply twice daily to affected parts both feet as needed. (Patient not taking: Reported on 3/2/2023), Disp: 140 g, Rfl: 11    azelastine (ASTELIN) 137 mcg (0.1 %) nasal spray, 2 sprays (274 mcg total) by Nasal route 2 (two) times daily., Disp: 60 mL, Rfl: 11    blood sugar diagnostic (BLOOD GLUCOSE TEST) Strp, 1 strip by Misc.(Non-Drug; Combo Route) route 2 (two) times a day. Insurance covers, Disp: 100 each, Rfl: 11    blood-glucose meter kit, Insurance covers, Disp: 1 each, Rfl: 0    budesonide-formoterol 160-4.5 mcg (SYMBICORT) 160-4.5 mcg/actuation HFAA, INHALE 2 PUFFS INTO THE LUNGS EVERY 12 HOURS, Disp: 30.6 g, Rfl: 3    cetirizine (ZYRTEC) 10 MG tablet, Take 1 tablet (10 mg total) by mouth daily as needed for Allergies (itching)., Disp: 90 tablet, Rfl: 1    clobetasoL (TEMOVATE) 0.05 % external  solution, Apply topically 2 (two) times daily. Prn scalp itch or rash (Patient not taking: Reported on 3/2/2023), Disp: 60 mL, Rfl: 1    cyclobenzaprine (FLEXERIL) 10 MG tablet, 10 mg 3 (three) times daily as needed., Disp: , Rfl:     diclofenac sodium (VOLTAREN) 1 % Gel, Apply 2 g topically 3 (three) times daily. Apply to painful joints, Disp: 300 g, Rfl: 2    fluticasone propionate (FLONASE) 50 mcg/actuation nasal spray, 2 sprays (100 mcg total) by Each Nostril route 2 (two) times a day., Disp: 32 g, Rfl: 11    glipiZIDE (GLUCOTROL) 10 MG TR24, Take 1 tablet (10 mg total) by mouth daily with breakfast., Disp: 90 tablet, Rfl: 3    hydroCHLOROthiazide (HYDRODIURIL) 12.5 MG Tab, Use daily as needed for leg swelling, or if BP >140/90 with losartan, Disp: 30 tablet, Rfl: 2    levocetirizine (XYZAL) 5 MG tablet, Take 1 tablet (5 mg total) by mouth every evening., Disp: 90 tablet, Rfl: 1    LIDOcaine HCL 2% (XYLOCAINE) 2 % jelly, Apply topically as needed. Apply topically once nightly to affected part of foot/feet., Disp: 30 mL, Rfl: 2    LINZESS 145 mcg Cap capsule, Take 1 capsule (145 mcg total) by mouth once daily., Disp: 90 capsule, Rfl: 1    losartan (COZAAR) 100 MG tablet, Take 1 tablet (100 mg total) by mouth once daily., Disp: 90 tablet, Rfl: 0    montelukast (SINGULAIR) 10 mg tablet, Take 1 tablet (10 mg total) by mouth every evening., Disp: 90 tablet, Rfl: 1    nicotine (NICODERM CQ) 21 mg/24 hr, Place 1 patch onto the skin once daily., Disp: 28 patch, Rfl: 0    nystatin-triamcinolone (MYCOLOG) ointment, Apply topically 2 (two) times daily. (Patient not taking: Reported on 3/2/2023), Disp: 60 g, Rfl: 2    ondansetron (ZOFRAN) 8 MG tablet, Take 1 tablet (8 mg total) by mouth every 8 (eight) hours as needed for Nausea., Disp: 90 tablet, Rfl: 11    oxyCODONE-acetaminophen (PERCOCET) 7.5-325 mg per tablet, Take 1 tablet by mouth 3 (three) times daily as needed for Pain., Disp: 90 tablet, Rfl: 0    pantoprazole  (PROTONIX) 40 MG tablet, Take 1 tablet (40 mg total) by mouth 2 (two) times daily., Disp: 180 tablet, Rfl: 1    pregabalin (LYRICA) 50 MG capsule, Take 1 capsule (50 mg total) by mouth 3 (three) times daily., Disp: 90 capsule, Rfl: 3    QUEtiapine (SEROQUEL) 25 MG Tab, Take 1 tablet (25 mg total) by mouth once daily., Disp: 90 tablet, Rfl: 1    semaglutide (OZEMPIC) 1 mg/dose (4 mg/3 mL), Inject 1 mg into the skin every 7 days., Disp: 4 pen, Rfl: 6    sucralfate (CARAFATE) 100 mg/mL suspension, Take 1 g by mouth 3 (three) times daily., Disp: , Rfl:     sulindac (CLINORIL) 200 MG Tab, Take 1 tablet (200 mg total) by mouth 2 (two) times daily., Disp: 30 tablet, Rfl: 0    topiramate (TOPAMAX) 100 MG tablet, Take 1.5 tablets (150 mg total) by mouth every evening., Disp: 180 tablet, Rfl: 1    triamcinolone acetonide 0.1% (KENALOG) 0.1 % cream, Apply topically 2 (two) times daily. Prn focally for rash spots on forehead and legs.Stop using steroid topical when skin is smooth and non itchy.  Do not treat dark or red coloring., Disp: 45 g, Rfl: 0    venlafaxine (EFFEXOR-XR) 75 MG 24 hr capsule, Take 1 capsule (75 mg total) by mouth once daily., Disp: 90 capsule, Rfl: 1    Current Facility-Administered Medications:     albuterol inhaler 2 puff, 2 puff, Inhalation, Q20 Min PRN, Carlyle Gordillo MD    EPINEPHrine (EPIPEN) 0.3 mg/0.3 mL pen injection 0.3 mg, 0.3 mg, Intramuscular, PRN, Carlyle Gordillo MD  Does patient have record of home blood pressure readings no.   Last dose of blood pressure medication was taken at AM 4/2/23.  Patient is asymptomatic.   Complains of none.    BP: 134/88 , Pulse: 80 .    NP Johan will be notified.

## 2023-04-05 ENCOUNTER — PATIENT MESSAGE (OUTPATIENT)
Dept: BARIATRICS | Facility: CLINIC | Age: 53
End: 2023-04-05
Payer: MEDICARE

## 2023-04-11 ENCOUNTER — PATIENT MESSAGE (OUTPATIENT)
Dept: BARIATRICS | Facility: CLINIC | Age: 53
End: 2023-04-11
Payer: MEDICARE

## 2023-04-13 ENCOUNTER — OFFICE VISIT (OUTPATIENT)
Dept: PODIATRY | Facility: CLINIC | Age: 53
End: 2023-04-13
Payer: MEDICARE

## 2023-04-13 VITALS
SYSTOLIC BLOOD PRESSURE: 136 MMHG | WEIGHT: 293 LBS | DIASTOLIC BLOOD PRESSURE: 85 MMHG | BODY MASS INDEX: 50.02 KG/M2 | HEIGHT: 64 IN | HEART RATE: 88 BPM

## 2023-04-13 DIAGNOSIS — E11.51 DIABETES MELLITUS WITH PERIPHERAL CIRCULATORY DISORDER: ICD-10-CM

## 2023-04-13 DIAGNOSIS — G89.4 CHRONIC PAIN SYNDROME: ICD-10-CM

## 2023-04-13 DIAGNOSIS — R60.0 LEG EDEMA: Primary | ICD-10-CM

## 2023-04-13 PROCEDURE — 99214 PR OFFICE/OUTPT VISIT, EST, LEVL IV, 30-39 MIN: ICD-10-PCS | Mod: HCNC,S$GLB,, | Performed by: PODIATRIST

## 2023-04-13 PROCEDURE — 99999 PR PBB SHADOW E&M-EST. PATIENT-LVL IV: CPT | Mod: PBBFAC,HCNC,, | Performed by: PODIATRIST

## 2023-04-13 PROCEDURE — 1159F MED LIST DOCD IN RCRD: CPT | Mod: HCNC,CPTII,S$GLB, | Performed by: PODIATRIST

## 2023-04-13 PROCEDURE — 3075F PR MOST RECENT SYSTOLIC BLOOD PRESS GE 130-139MM HG: ICD-10-PCS | Mod: HCNC,CPTII,S$GLB, | Performed by: PODIATRIST

## 2023-04-13 PROCEDURE — 3072F PR LOW RISK FOR RETINOPATHY: ICD-10-PCS | Mod: HCNC,CPTII,S$GLB, | Performed by: PODIATRIST

## 2023-04-13 PROCEDURE — 3075F SYST BP GE 130 - 139MM HG: CPT | Mod: HCNC,CPTII,S$GLB, | Performed by: PODIATRIST

## 2023-04-13 PROCEDURE — 99214 OFFICE O/P EST MOD 30 MIN: CPT | Mod: HCNC,S$GLB,, | Performed by: PODIATRIST

## 2023-04-13 PROCEDURE — 4010F ACE/ARB THERAPY RXD/TAKEN: CPT | Mod: HCNC,CPTII,S$GLB, | Performed by: PODIATRIST

## 2023-04-13 PROCEDURE — 3008F BODY MASS INDEX DOCD: CPT | Mod: HCNC,CPTII,S$GLB, | Performed by: PODIATRIST

## 2023-04-13 PROCEDURE — 3008F PR BODY MASS INDEX (BMI) DOCUMENTED: ICD-10-PCS | Mod: HCNC,CPTII,S$GLB, | Performed by: PODIATRIST

## 2023-04-13 PROCEDURE — 3079F PR MOST RECENT DIASTOLIC BLOOD PRESSURE 80-89 MM HG: ICD-10-PCS | Mod: HCNC,CPTII,S$GLB, | Performed by: PODIATRIST

## 2023-04-13 PROCEDURE — 3079F DIAST BP 80-89 MM HG: CPT | Mod: HCNC,CPTII,S$GLB, | Performed by: PODIATRIST

## 2023-04-13 PROCEDURE — 1159F PR MEDICATION LIST DOCUMENTED IN MEDICAL RECORD: ICD-10-PCS | Mod: HCNC,CPTII,S$GLB, | Performed by: PODIATRIST

## 2023-04-13 PROCEDURE — 3072F LOW RISK FOR RETINOPATHY: CPT | Mod: HCNC,CPTII,S$GLB, | Performed by: PODIATRIST

## 2023-04-13 PROCEDURE — 4010F PR ACE/ARB THEARPY RXD/TAKEN: ICD-10-PCS | Mod: HCNC,CPTII,S$GLB, | Performed by: PODIATRIST

## 2023-04-13 PROCEDURE — 99999 PR PBB SHADOW E&M-EST. PATIENT-LVL IV: ICD-10-PCS | Mod: PBBFAC,HCNC,, | Performed by: PODIATRIST

## 2023-04-13 NOTE — PROGRESS NOTES
Subjective:      Patient ID: Yanni Kaiser is a 52 y.o. female.    Chief Complaint: Foot Pain (Bilateral foot pain)    She has generalized foot pain in multiple joint pain she a is seeing physical medication/pain management.  Follow-up for foot pain and diabetes foot exam.  Increased     Review of Systems   Constitutional: Negative for chills, decreased appetite, fever, malaise/fatigue and night sweats.   HENT:  Negative for congestion, ear discharge, hearing loss, nosebleeds and tinnitus.    Eyes:  Negative for double vision, pain and visual disturbance.   Cardiovascular:  Negative for chest pain, claudication, cyanosis and palpitations.   Respiratory:  Negative for cough, hemoptysis, shortness of breath and wheezing.    Endocrine: Negative for cold intolerance and heat intolerance.   Hematologic/Lymphatic: Negative for adenopathy and bleeding problem.   Skin:  Positive for color change, dry skin, nail changes and unusual hair distribution. Negative for flushing and rash.   Musculoskeletal:  Positive for arthritis and stiffness. Negative for joint pain and myalgias.        Pain to both feet   Gastrointestinal:  Negative for abdominal pain, dysphagia, nausea and vomiting.   Genitourinary:  Negative for dysuria, flank pain, hematuria and pelvic pain.   Neurological:  Positive for disturbances in coordination. Negative for loss of balance, numbness, paresthesias and sensory change.   Psychiatric/Behavioral:  Negative for altered mental status, hallucinations and suicidal ideas. The patient does not have insomnia.    Allergic/Immunologic: Negative for environmental allergies and persistent infections.         Objective:      Physical Exam  Vitals reviewed.   Constitutional:       Appearance: She is well-developed.   HENT:      Head: Normocephalic and atraumatic.   Cardiovascular:      Pulses:           Dorsalis pedis pulses are 2+ on the right side and 2+ on the left side.        Posterior tibial pulses are 2+  on the right side and 2+ on the left side.   Pulmonary:      Effort: Pulmonary effort is normal.   Musculoskeletal:         General: Normal range of motion.      Right knee: No swelling or ecchymosis.      Left knee: No swelling or ecchymosis.      Right lower leg: No swelling, deformity, tenderness or bony tenderness. No edema.      Left lower leg: No swelling or tenderness. No edema.      Right ankle: No swelling or ecchymosis. No lateral malleolus or medial malleolus tenderness. Normal range of motion. Normal pulse.      Right Achilles Tendon: No defects. Copeland's test negative.      Left ankle: No swelling or ecchymosis. No lateral malleolus or medial malleolus tenderness. Normal range of motion. Normal pulse.      Left Achilles Tendon: Copeland's test negative.      Right foot: Normal range of motion. No deformity.      Left foot: Normal range of motion. No deformity.      Comments: Tenderness noted to the plantar left great toe joint underlying the sesamoid complex.   Feet:      Right foot:      Protective Sensation: 5 sites tested.  5 sites sensed.      Skin integrity: Callus and dry skin present.      Left foot:      Protective Sensation: 5 sites tested.  5 sites sensed.      Skin integrity: Callus and dry skin present.   Skin:     General: Skin is warm and dry.      Capillary Refill: Capillary refill takes more than 3 seconds.      Coloration: Skin is pale.      Findings: No abrasion, bruising, burn or ecchymosis.      Comments: No open lesions noted to b/L lower extremities.     Examination of the skin reveals no evidence of significant rashes, open lesions, suspicious appearing nevi or other concerning lesions.      Neurological:      Mental Status: She is alert and oriented to person, place, and time.      Sensory: Sensory deficit present.      Motor: Atrophy present.      Deep Tendon Reflexes: Reflexes abnormal.      Reflex Scores:       Patellar reflexes are 1+ on the right side and 1+ on the left  side.       Achilles reflexes are 1+ on the right side and 1+ on the left side.     Comments: Tenderness in Tinel sign noted to the left deep peroneal and tarsal tunnel region.  Positive Tinel sign and provocation sign noted.  Light touch intact.  MMT 5/5 DF, PF, Inv, Ev  Normal muscle tone.  No signs of atrophy.  No tremor or spasticity.     Psychiatric:         Attention and Perception: She is attentive.         Speech: Speech normal.         Behavior: Behavior normal.     Tenderness bilateral anterior medial aspect of ankle joint.  No crepitation noted.        Assessment:       Encounter Diagnoses   Name Primary?    Leg edema Yes    Diabetes mellitus with peripheral circulatory disorder     Chronic pain syndrome          Plan:       Yanni was seen today for foot pain.    Diagnoses and all orders for this visit:    Leg edema  -     CV US Lower Extremity Veins Bilateral Insufficiency; Future    Diabetes mellitus with peripheral circulatory disorder  -     CV US Lower Extremity Veins Bilateral Insufficiency; Future    Chronic pain syndrome  -     CV US Lower Extremity Veins Bilateral Insufficiency; Future      I counseled the patient on her conditions, their implications and medical management.    I recommended patient be fitted for orthoses.  I explained that orthoses may improve function of the foot, reduce pain, decrease pronation, increase efficiency of muscle function of the foot and ankle and prevent surgery.  Alternative forms of biomechanical control of the foot and ankle were discussed with the patient.      Venous studies ordered.  We will follow-up with patient regarding results.      The nature of the condition, options for management, as well as potential risks and complications were discussed in detail with patient. Patient was amenable to my recommendations and left my office fully informed and will follow up as instructed or sooner if necessary.      Discussed options for peripheral  neuropathy/nerve entrapment syndrome including nerve block therapy, surgical nerve entrapment decompression procedures, and various vitamins and supplementation available shown to improve nerve function.    Shoe inspection. Diabetic Foot Education. Patient reminded of the importance of good nutrition and blood sugar control to help prevent podiatric complications of diabetes. Patient instructed on proper foot hygeine. We discussed wearing proper shoe gear, daily foot inspections and Diabetic foot education in detail.    Return to clinic in 3-6 months or sooner if problems arise

## 2023-04-14 ENCOUNTER — OFFICE VISIT (OUTPATIENT)
Dept: BARIATRICS | Facility: CLINIC | Age: 53
End: 2023-04-14
Payer: MEDICARE

## 2023-04-14 VITALS
OXYGEN SATURATION: 100 % | HEART RATE: 71 BPM | WEIGHT: 293 LBS | BODY MASS INDEX: 50.02 KG/M2 | DIASTOLIC BLOOD PRESSURE: 75 MMHG | HEIGHT: 64 IN | TEMPERATURE: 97 F | SYSTOLIC BLOOD PRESSURE: 134 MMHG

## 2023-04-14 DIAGNOSIS — E11.65 TYPE 2 DIABETES MELLITUS WITH HYPERGLYCEMIA, WITHOUT LONG-TERM CURRENT USE OF INSULIN: ICD-10-CM

## 2023-04-14 DIAGNOSIS — G47.33 OSA (OBSTRUCTIVE SLEEP APNEA): ICD-10-CM

## 2023-04-14 DIAGNOSIS — E78.2 MIXED HYPERLIPIDEMIA: Chronic | ICD-10-CM

## 2023-04-14 DIAGNOSIS — Z71.3 ENCOUNTER FOR WEIGHT LOSS COUNSELING: ICD-10-CM

## 2023-04-14 DIAGNOSIS — I10 ESSENTIAL HYPERTENSION: Chronic | ICD-10-CM

## 2023-04-14 DIAGNOSIS — E66.01 CLASS 3 SEVERE OBESITY DUE TO EXCESS CALORIES WITH SERIOUS COMORBIDITY AND BODY MASS INDEX (BMI) OF 60.0 TO 69.9 IN ADULT: Primary | ICD-10-CM

## 2023-04-14 PROCEDURE — 3078F PR MOST RECENT DIASTOLIC BLOOD PRESSURE < 80 MM HG: ICD-10-PCS | Mod: HCNC,CPTII,S$GLB, | Performed by: STUDENT IN AN ORGANIZED HEALTH CARE EDUCATION/TRAINING PROGRAM

## 2023-04-14 PROCEDURE — 4010F PR ACE/ARB THEARPY RXD/TAKEN: ICD-10-PCS | Mod: HCNC,CPTII,S$GLB, | Performed by: STUDENT IN AN ORGANIZED HEALTH CARE EDUCATION/TRAINING PROGRAM

## 2023-04-14 PROCEDURE — 99204 OFFICE O/P NEW MOD 45 MIN: CPT | Mod: HCNC,S$GLB,, | Performed by: STUDENT IN AN ORGANIZED HEALTH CARE EDUCATION/TRAINING PROGRAM

## 2023-04-14 PROCEDURE — 3072F PR LOW RISK FOR RETINOPATHY: ICD-10-PCS | Mod: HCNC,CPTII,S$GLB, | Performed by: STUDENT IN AN ORGANIZED HEALTH CARE EDUCATION/TRAINING PROGRAM

## 2023-04-14 PROCEDURE — 1160F RVW MEDS BY RX/DR IN RCRD: CPT | Mod: HCNC,CPTII,S$GLB, | Performed by: STUDENT IN AN ORGANIZED HEALTH CARE EDUCATION/TRAINING PROGRAM

## 2023-04-14 PROCEDURE — 3008F PR BODY MASS INDEX (BMI) DOCUMENTED: ICD-10-PCS | Mod: HCNC,CPTII,S$GLB, | Performed by: STUDENT IN AN ORGANIZED HEALTH CARE EDUCATION/TRAINING PROGRAM

## 2023-04-14 PROCEDURE — 1159F MED LIST DOCD IN RCRD: CPT | Mod: HCNC,CPTII,S$GLB, | Performed by: STUDENT IN AN ORGANIZED HEALTH CARE EDUCATION/TRAINING PROGRAM

## 2023-04-14 PROCEDURE — 3078F DIAST BP <80 MM HG: CPT | Mod: HCNC,CPTII,S$GLB, | Performed by: STUDENT IN AN ORGANIZED HEALTH CARE EDUCATION/TRAINING PROGRAM

## 2023-04-14 PROCEDURE — 99204 PR OFFICE/OUTPT VISIT, NEW, LEVL IV, 45-59 MIN: ICD-10-PCS | Mod: HCNC,S$GLB,, | Performed by: STUDENT IN AN ORGANIZED HEALTH CARE EDUCATION/TRAINING PROGRAM

## 2023-04-14 PROCEDURE — 3075F SYST BP GE 130 - 139MM HG: CPT | Mod: HCNC,CPTII,S$GLB, | Performed by: STUDENT IN AN ORGANIZED HEALTH CARE EDUCATION/TRAINING PROGRAM

## 2023-04-14 PROCEDURE — 1160F PR REVIEW ALL MEDS BY PRESCRIBER/CLIN PHARMACIST DOCUMENTED: ICD-10-PCS | Mod: HCNC,CPTII,S$GLB, | Performed by: STUDENT IN AN ORGANIZED HEALTH CARE EDUCATION/TRAINING PROGRAM

## 2023-04-14 PROCEDURE — 99999 PR PBB SHADOW E&M-EST. PATIENT-LVL V: CPT | Mod: PBBFAC,HCNC,, | Performed by: STUDENT IN AN ORGANIZED HEALTH CARE EDUCATION/TRAINING PROGRAM

## 2023-04-14 PROCEDURE — 4010F ACE/ARB THERAPY RXD/TAKEN: CPT | Mod: HCNC,CPTII,S$GLB, | Performed by: STUDENT IN AN ORGANIZED HEALTH CARE EDUCATION/TRAINING PROGRAM

## 2023-04-14 PROCEDURE — 3008F BODY MASS INDEX DOCD: CPT | Mod: HCNC,CPTII,S$GLB, | Performed by: STUDENT IN AN ORGANIZED HEALTH CARE EDUCATION/TRAINING PROGRAM

## 2023-04-14 PROCEDURE — 3075F PR MOST RECENT SYSTOLIC BLOOD PRESS GE 130-139MM HG: ICD-10-PCS | Mod: HCNC,CPTII,S$GLB, | Performed by: STUDENT IN AN ORGANIZED HEALTH CARE EDUCATION/TRAINING PROGRAM

## 2023-04-14 PROCEDURE — 3072F LOW RISK FOR RETINOPATHY: CPT | Mod: HCNC,CPTII,S$GLB, | Performed by: STUDENT IN AN ORGANIZED HEALTH CARE EDUCATION/TRAINING PROGRAM

## 2023-04-14 PROCEDURE — 1159F PR MEDICATION LIST DOCUMENTED IN MEDICAL RECORD: ICD-10-PCS | Mod: HCNC,CPTII,S$GLB, | Performed by: STUDENT IN AN ORGANIZED HEALTH CARE EDUCATION/TRAINING PROGRAM

## 2023-04-14 PROCEDURE — 99999 PR PBB SHADOW E&M-EST. PATIENT-LVL V: ICD-10-PCS | Mod: PBBFAC,HCNC,, | Performed by: STUDENT IN AN ORGANIZED HEALTH CARE EDUCATION/TRAINING PROGRAM

## 2023-04-14 RX ORDER — TIRZEPATIDE 2.5 MG/.5ML
2.5 INJECTION, SOLUTION SUBCUTANEOUS
Qty: 4 PEN | Refills: 0 | Status: SHIPPED | OUTPATIENT
Start: 2023-04-14 | End: 2023-05-15

## 2023-04-14 RX ORDER — TIRZEPATIDE 7.5 MG/.5ML
7.5 INJECTION, SOLUTION SUBCUTANEOUS
Qty: 4 PEN | Refills: 0 | Status: SHIPPED | OUTPATIENT
Start: 2023-06-09 | End: 2023-07-01

## 2023-04-14 RX ORDER — TIRZEPATIDE 5 MG/.5ML
5 INJECTION, SOLUTION SUBCUTANEOUS
Qty: 4 PEN | Refills: 0 | Status: SHIPPED | OUTPATIENT
Start: 2023-05-12 | End: 2023-05-18 | Stop reason: SDUPTHER

## 2023-04-14 NOTE — PATIENT INSTRUCTIONS
Start Mounjaro 2.5 mg weekly x 4 weeks, then 5 mg weekly x 4 weeks, then 7.5 mg weekly x 4 weeks.          Copyright © 2011, [facility]. For more information about The Healthy Eating Plate, please see The Nutrition Source, Department of Nutrition, [employer], www.thenutritionsource.org, and [employer], www.health.[place].edu.      Meal Planning & Grocery Shopping    Meal planning builds the foundation for healthy eating. When you have structured ideas for healthy meals and foods available at home to prepare those meals, weight control becomes easier.  If only healthy foods are available at home, then you will be much more likely to eat healthy foods. And you will be less likely to go to a restaurant or  a fast food meal, which tend to be unhealthy and higher in calories than meals prepared at home.      Take 5-10 minutes each week to plan meals for the next 7 days.  Make a grocery list based on the meal plan.    Grocery Shopping Tips:  Shop on a full stomach.  Schedule your shopping for times when you are most motivated and able to be disciplined about your purchases. For example, after a stressful day at work it may be difficult to make the healthiest choices. Shopping at other times, such as early in the morning or after dinner, may be easier.  Focus your shopping on the outside aisles of the store, which tend to contain more fresh foods and lower calorie foods. The inside aisles tend to have more processed foods.  Stick to your list. Avoid buying unhealthy items just because they are on sale.   Compare nutrition labels to check the number of calories and percentage of fat.      What to buy:    Vegetables  Fresh vegetables  Frozen vegetables with no sauce or added salt  Canned vegetables with no sauce or added salt    Protein  Lean meats, such as chicken and turkey  Limit red meats, such as beef to no more than 1x/week  Limit processed meats, such as  cold cuts, davidson, sausage, and hot dogs. Look for brands that have no nitrites and are minimally processed. Consider turkey sausage or turkey davidson.  Fish and Shellfish  Eggs  Dried beans  Canned beans (reduced sodium)    Fat  Use healthy oils, such as olive oil or canola oil, for cooking, salad dressings, etc.  Unflavored nuts and seeds  Nut butters (no added sugar)    Dairy  Yogurt (no sugar added)  Cheese  Low-fat milk  Unsweetened nondairy milks (almond milk, soy milk, etc)    Fruit  Fresh Fruit  Frozen fruit with no added sugar  Canned fruit with no added sugar  Dried fruit with no added sugar  100% fruit juice    Whole Grains  Single ingredient grains, such as oats, quinoa, brown rice  Whole-wheat pasta  Sprouted whole-grain bread    What to avoid:  - Avoid fried foods  - Avoid foods with added sugar  - Avoid sugar-sweetened beverages  - Avoid ultra-processed foods      SEATED RESISTANCE BAND EXERCISES    If you do not have a resistance band, or do not feel comfortable using a resistance band, these exercises can also be done holding a light hand weight or water bottle.  If you are just starting to exercise, you may want to go through the motions without any weights or resistance till you become comfortable with the movements.    Do each of the movements shown 10 times (10 repetitions). You can repeat the exercises a second or  third time as well for greater benefit. The amount of tension on the resistance bands should be adjusted so  you can complete one set of 10 repetitions with effort. Increase the tension every few weeks. Do these  exercises 2 or 3 times a week.    * If an exercise hurts your back or joints, stop doing that particular exercise, but keep doing all the others *      CHEST EXERCISE        Start Position:   Sit tall, with feet shoulder width apart and feet in front of knees.   Belly pulled in.   Place the band around your upper back, grasp band in each hand, knuckles (rings) facing front.  Arms  should be bent so that knuckles are in front of elbows   Slide shoulder blades down your back and slightly together (as if making a V).   Relax your neck.    To Perform This Exercise:   Press hands forward to lengthen arms using chest muscles (not arms!).   Dont arch your back!)   Return to start position and repeat 10 times.   Breathe!        BACK EXERCISE        Start Position:   Sit tall, with feet shoulder width apart and feet in front of knees.   Belly pulled in.   Grasp band in each hand and raise overhead. Arms should be slightly wider than shoulder width and no  slack in the band.   Slide shoulder blades down your back and slightly together (as if making a V).   Relax your neck.    To Perform This Exercise:   Open arms pulling down towards chest using upper back muscles (not arms!).   Squeeze through shoulder blades at the bottom of the movement (dont arch your back!).   Return to start position and repeat 10 times.   Breathe!        SHOULDER EXERCISE        Start Position:   Sit tall, with feet shoulder width apart and feet in front of knees.   Belly pulled in.   Sit on band so that you can grasp band in one hand with tension on the band, but not so much tension that  you cannot straighten the arm. It may take a few tries to find the right amount of tension.   Slide shoulder blades down your back and slightly together (as if making a V).   Relax your neck.    To Perform This Exercise:   Press fist to the ceiling, slightly in front of the body.   SLOWLY return to start position and repeat 10 times.   Switch sides and repeat on the other side.   Breathe!        TRICEPS EXERCISE        Start Position:   Sit tall, with feet shoulder width apart and feet in front of knees.   Belly pulled in.   Sit on one end of the band. Grasp other end of band in one hand.   Point elbow directly toward the ceiling (if this is difficult, you may support the upper arm with the opposite  hand)   Be sure there is no  slack in the band in the starting position.   Slide shoulder blades down your back and slightly together (as if making a V).   Relax your neck.    To Perform This Exercise:   Extend your arm up to the ceiling, as shown.   Squeeze through shoulder blades at the bottom of the movement (dont arch your back!).   SLOWLY return to start position and repeat 10 times.   Repeat on the other side.   Breathe!        BICEP EXERCISE        Start Position:   Sit tall, with feet shoulder width apart and feet in front of knees.   Belly pulled in.   Place center of exercise band under one foot and step the end of the band under the other foot.   Grasp each end of the band with one hand. Make sure there is no slack between your foot and the hand  that holds the band.   Hold your elbows to your sides.   Pull abdominals in, lift chest, press shoulders down and back.    To Perform This Exercise:   As you curl up, keep your wrist from changing position in relation to your forearm and your arm stable  from the shoulder to the elbow.   Bend and straighten your elbow in a slow and controlled movement. Repeat this motion 10 times.   Repeat on the other side.   Breathe!

## 2023-04-14 NOTE — PROGRESS NOTES
Subjective     Patient ID: Yanni Kaiser is a 52 y.o. female.    Chief Complaint: Obesity and Consult    Patient presents for treatment of obesity.   Previously seen by Dr. Salcedo, prescribed Ozempic, did well initially but has regained weight since July  Also on topiramate for Migraines    Co-morbidities   HTN  HLD  DM2  GERD  Migraines  SHARATH      Current Physical Activity  No regular exercise, limited due to joint and nerve pain    Current Eating Habits  Baked chicken, turkey  Cooked vegetables  Avoids beef because of digestive problems  Avoids fried foods  Powerade zero, water, [place] sweet tea    4/14/2023: 369.9 lbs, BMI 63.5, BFP 55.2%, .4 lbs, SMM 92.6 lbs, BMR 1993 kcal        Review of Systems   Constitutional:  Negative for chills and fever.   Respiratory:  Negative for shortness of breath.    Cardiovascular:  Negative for chest pain and palpitations.   Gastrointestinal:  Negative for abdominal pain, nausea and vomiting.   Musculoskeletal:  Positive for arthralgias and back pain.   Neurological:  Negative for dizziness and light-headedness.   Psychiatric/Behavioral:  The patient is not nervous/anxious.         Objective    Latest Reference Range & Units 02/15/23 10:25 04/03/23 09:58   Sodium 136 - 145 mmol/L 141 145   Potassium 3.5 - 5.1 mmol/L 4.3 4.4   Chloride 95 - 110 mmol/L 106 110   CO2 23 - 29 mmol/L 25 27   Anion Gap 8 - 16 mmol/L 10 8   BUN 6 - 20 mg/dL 10 14   Creatinine 0.5 - 1.4 mg/dL 1.3 1.2   eGFR >60 mL/min/1.73 m^2 50 ! 55 !   Glucose 70 - 110 mg/dL 296 (H) 74   Calcium 8.7 - 10.5 mg/dL 8.8 9.0   !: Data is abnormal  (H): Data is abnormally high      Vitals:    04/14/23 1109   BP: 134/75   Pulse: 71   Temp: 97.3 °F (36.3 °C)       Physical Exam  Vitals reviewed.   Constitutional:       General: She is not in acute distress.     Appearance: Normal appearance. She is obese. She is not ill-appearing, toxic-appearing or diaphoretic.   HENT:      Head: Normocephalic and  atraumatic.   Cardiovascular:      Rate and Rhythm: Normal rate.   Pulmonary:      Effort: Pulmonary effort is normal. No respiratory distress.   Skin:     General: Skin is warm and dry.   Neurological:      Mental Status: She is alert and oriented to person, place, and time.          Assessment and Plan     Problem List Items Addressed This Visit       Essential hypertension (Chronic)    Hyperlipidemia (Chronic)    SHARATH (obstructive sleep apnea)    Type 2 diabetes mellitus with hyperglycemia, without long-term current use of insulin    Relevant Medications    tirzepatide (MOUNJARO) 2.5 mg/0.5 mL PnIj    tirzepatide (MOUNJARO) 5 mg/0.5 mL PnIj (Start on 5/12/2023)    tirzepatide (MOUNJARO) 7.5 mg/0.5 mL PnIj (Start on 6/9/2023)     Other Visit Diagnoses       Class 3 severe obesity due to excess calories with serious comorbidity and body mass index (BMI) of 60.0 to 69.9 in adult    -  Primary    Relevant Medications    tirzepatide (MOUNJARO) 2.5 mg/0.5 mL PnIj    tirzepatide (MOUNJARO) 5 mg/0.5 mL PnIj (Start on 5/12/2023)    tirzepatide (MOUNJARO) 7.5 mg/0.5 mL PnIj (Start on 6/9/2023)    Encounter for weight loss counseling                - Start Mounjaro weekly injections; Discontinue Ozempic     - Log all food and beverage intake with a daily calorie goal of 1500 calories per day    - Seated resistance band exercise given in AVS

## 2023-04-14 NOTE — PROGRESS NOTES
Subjective:       Patient ID: Yanni Kaiser is a 52 y.o. female.    Chief Complaint: No chief complaint on file.    Pt here today for follow-up. Has lost net neg 14 lbs (0 lbs muscle. -3 lbs fat).    . Started LCHF diet and ozempic. State she is not eating much starch. Has given up sweet tea. Denies SE with ozempic 1 mg. Also on Jardiance for DM. BP is good today. A1c with significant improvement.   She has been more active. Went through functional restoration program and now going to gym. SHe also quit smoking 2 weeks ago.   Has not been on trokendi for migraines because the pill is too large. Appears she should be on 600 mg a day with Herb Fry MD, but pt is not on it either.   States she is avoiding fried foods. Eating more baked chicken, shrimp, fish, pasta or salad.     Lab Results   Component Value Date    HGBA1C 5.8 (H) 12/20/2022    HGBA1C 6.4 (H) 06/20/2022    HGBA1C 8.7 (H) 03/30/2022     Lab Results   Component Value Date    MICROALBUR 7.2 04/01/2014    LDLCALC 126.4 06/20/2022    CREATININE 1.2 04/03/2023     New BMR:     New PBF: 55.1%    Initial inbody 4/6/22.  BMR: 1918  PBF: 55.9%    Follow-up  Associated symptoms include arthralgias and chest pain. Pertinent negatives include no chills or fever.     Review of Systems   Constitutional: Negative for chills and fever.   Respiratory: Positive for apnea and shortness of breath.         Does not use CPAP machine. States did not like it.    Cardiovascular: Positive for chest pain and leg swelling.        With asthma sx   Gastrointestinal: Positive for constipation and diarrhea.        + GERD   Endocrine: Positive for cold intolerance.   Genitourinary: Negative for difficulty urinating and dysuria.           Musculoskeletal: Positive for arthralgias and back pain.   Neurological: Positive for dizziness and light-headedness.   Psychiatric/Behavioral: Positive for dysphoric mood. The patient is not nervous/anxious.        Objective:      [facility] 08/17/2019     Physical Exam  Vitals reviewed.   Constitutional:       General: She is not in acute distress.     Appearance: She is well-developed.      Comments: Morbidly obese     HENT:      Head: Normocephalic and atraumatic.   Eyes:      General: No scleral icterus.     Pupils: Pupils are equal, round, and reactive to light.   Cardiovascular:      Rate and Rhythm: Normal rate.   Pulmonary:      Effort: Pulmonary effort is normal.   Musculoskeletal:         General: Normal range of motion.      Cervical back: Normal range of motion and neck supple.   Skin:     General: Skin is warm and dry.      Findings: No erythema.   Neurological:      Mental Status: She is alert and oriented to person, place, and time.      Cranial Nerves: No cranial nerve deficit.   Psychiatric:         Behavior: Behavior normal.         Judgment: Judgment normal.         Assessment:       No diagnosis found.      Plan:         There are no diagnoses linked to this encounter.       Patient was informed that topiramate is used for migraine prevention and seizures. Weight loss is a common side effect that is well documented. S/he understands this. S/he was informed of the potential side effects such as serious and possibly fatal rash in which case the medication should be discontinued immediately. Paresthesias, forgetfulness, fatigue, kidney stones, GI symptoms, and changes in lab values such as electrolytes, blood counts and kidney function.     topiramate 100 mg evening.     Continue with Ozempic 2 mg once a week.      Decrease portions as soon as you start Ozempic. Some nausea in the first 2 weeks is not unusual.     If you get pain across the upper abdomen and around to your back, please call the office.     Add some type of resistance training 2-3 days a week. These can be body weight exercises, light weight or elastic bands. [employer] and [employer] are great sources for free work out plans and videos.     1500 rachid pb meal planner  given.

## 2023-04-17 ENCOUNTER — OFFICE VISIT (OUTPATIENT)
Dept: FAMILY MEDICINE | Facility: CLINIC | Age: 53
End: 2023-04-17
Payer: MEDICARE

## 2023-04-17 ENCOUNTER — HOSPITAL ENCOUNTER (OUTPATIENT)
Dept: RADIOLOGY | Facility: HOSPITAL | Age: 53
Discharge: HOME | End: 2023-04-17
Payer: MEDICARE

## 2023-04-17 VITALS
SYSTOLIC BLOOD PRESSURE: 140 MMHG | HEART RATE: 81 BPM | WEIGHT: 293 LBS | OXYGEN SATURATION: 99 % | HEIGHT: 64 IN | DIASTOLIC BLOOD PRESSURE: 86 MMHG | BODY MASS INDEX: 50.02 KG/M2 | TEMPERATURE: 98 F

## 2023-04-17 DIAGNOSIS — J32.8 OTHER CHRONIC SINUSITIS: ICD-10-CM

## 2023-04-17 DIAGNOSIS — F33.42 RECURRENT MAJOR DEPRESSIVE DISORDER, IN FULL REMISSION: Chronic | ICD-10-CM

## 2023-04-17 DIAGNOSIS — F17.200 NEEDS SMOKING CESSATION EDUCATION: ICD-10-CM

## 2023-04-17 DIAGNOSIS — E11.65 TYPE 2 DIABETES MELLITUS WITH HYPERGLYCEMIA, WITHOUT LONG-TERM CURRENT USE OF INSULIN: ICD-10-CM

## 2023-04-17 DIAGNOSIS — J45.40 MODERATE PERSISTENT ASTHMA WITHOUT COMPLICATION: Primary | ICD-10-CM

## 2023-04-17 DIAGNOSIS — R05.9 COUGH, UNSPECIFIED TYPE: ICD-10-CM

## 2023-04-17 DIAGNOSIS — I10 ESSENTIAL HYPERTENSION: Chronic | ICD-10-CM

## 2023-04-17 DIAGNOSIS — F17.210 CIGARETTE NICOTINE DEPENDENCE WITHOUT COMPLICATION: ICD-10-CM

## 2023-04-17 LAB
CTP QC/QA: YES
POC MOLECULAR INFLUENZA A AGN: NEGATIVE
POC MOLECULAR INFLUENZA B AGN: NEGATIVE

## 2023-04-17 PROCEDURE — 71046 X-RAY EXAM CHEST 2 VIEWS: CPT | Mod: TC,HCNC,FY

## 2023-04-17 PROCEDURE — 99406 PR TOBACCO USE CESSATION INTERMEDIATE 3-10 MINUTES: ICD-10-PCS | Mod: HCNC,S$GLB,,

## 2023-04-17 PROCEDURE — 3008F BODY MASS INDEX DOCD: CPT | Mod: HCNC,CPTII,S$GLB,

## 2023-04-17 PROCEDURE — 99406 BEHAV CHNG SMOKING 3-10 MIN: CPT | Mod: HCNC,S$GLB,,

## 2023-04-17 PROCEDURE — 99999 PR PBB SHADOW E&M-EST. PATIENT-LVL V: CPT | Mod: PBBFAC,HCNC,,

## 2023-04-17 PROCEDURE — 3072F LOW RISK FOR RETINOPATHY: CPT | Mod: HCNC,CPTII,S$GLB,

## 2023-04-17 PROCEDURE — 3077F PR MOST RECENT SYSTOLIC BLOOD PRESSURE >= 140 MM HG: ICD-10-PCS | Mod: HCNC,CPTII,S$GLB,

## 2023-04-17 PROCEDURE — 3079F PR MOST RECENT DIASTOLIC BLOOD PRESSURE 80-89 MM HG: ICD-10-PCS | Mod: HCNC,CPTII,S$GLB,

## 2023-04-17 PROCEDURE — 1159F PR MEDICATION LIST DOCUMENTED IN MEDICAL RECORD: ICD-10-PCS | Mod: HCNC,CPTII,S$GLB,

## 2023-04-17 PROCEDURE — 99999 PR PBB SHADOW E&M-EST. PATIENT-LVL V: ICD-10-PCS | Mod: PBBFAC,HCNC,,

## 2023-04-17 PROCEDURE — 4010F ACE/ARB THERAPY RXD/TAKEN: CPT | Mod: HCNC,CPTII,S$GLB,

## 2023-04-17 PROCEDURE — 1160F RVW MEDS BY RX/DR IN RCRD: CPT | Mod: HCNC,CPTII,S$GLB,

## 2023-04-17 PROCEDURE — 87502 POCT INFLUENZA A/B MOLECULAR: ICD-10-PCS | Mod: QW,HCNC,S$GLB,

## 2023-04-17 PROCEDURE — 94640 PR INHAL RX, AIRWAY OBST/DX SPUTUM INDUCT: ICD-10-PCS | Mod: HCNC,S$GLB,,

## 2023-04-17 PROCEDURE — 1159F MED LIST DOCD IN RCRD: CPT | Mod: HCNC,CPTII,S$GLB,

## 2023-04-17 PROCEDURE — 3077F SYST BP >= 140 MM HG: CPT | Mod: HCNC,CPTII,S$GLB,

## 2023-04-17 PROCEDURE — 3072F PR LOW RISK FOR RETINOPATHY: ICD-10-PCS | Mod: HCNC,CPTII,S$GLB,

## 2023-04-17 PROCEDURE — 71046 X-RAY EXAM CHEST 2 VIEWS: CPT | Mod: 26,HCNC,, | Performed by: INTERNAL MEDICINE

## 2023-04-17 PROCEDURE — 87502 INFLUENZA DNA AMP PROBE: CPT | Mod: QW,HCNC,S$GLB,

## 2023-04-17 PROCEDURE — 3008F PR BODY MASS INDEX (BMI) DOCUMENTED: ICD-10-PCS | Mod: HCNC,CPTII,S$GLB,

## 2023-04-17 PROCEDURE — 3079F DIAST BP 80-89 MM HG: CPT | Mod: HCNC,CPTII,S$GLB,

## 2023-04-17 PROCEDURE — 99214 PR OFFICE/OUTPT VISIT, EST, LEVL IV, 30-39 MIN: ICD-10-PCS | Mod: 25,HCNC,S$GLB,

## 2023-04-17 PROCEDURE — U0005 INFEC AGEN DETEC AMPLI PROBE: HCPCS

## 2023-04-17 PROCEDURE — 1160F PR REVIEW ALL MEDS BY PRESCRIBER/CLIN PHARMACIST DOCUMENTED: ICD-10-PCS | Mod: HCNC,CPTII,S$GLB,

## 2023-04-17 PROCEDURE — 94640 AIRWAY INHALATION TREATMENT: CPT | Mod: HCNC,S$GLB,,

## 2023-04-17 PROCEDURE — 99214 OFFICE O/P EST MOD 30 MIN: CPT | Mod: 25,HCNC,S$GLB,

## 2023-04-17 PROCEDURE — 4010F PR ACE/ARB THEARPY RXD/TAKEN: ICD-10-PCS | Mod: HCNC,CPTII,S$GLB,

## 2023-04-17 PROCEDURE — 71046 XR CHEST PA AND LATERAL: ICD-10-PCS | Mod: 26,HCNC,, | Performed by: INTERNAL MEDICINE

## 2023-04-17 RX ORDER — IPRATROPIUM BROMIDE AND ALBUTEROL SULFATE 2.5; .5 MG/3ML; MG/3ML
3 SOLUTION RESPIRATORY (INHALATION)
Status: COMPLETED | OUTPATIENT
Start: 2023-04-17 | End: 2023-04-17

## 2023-04-17 RX ORDER — DEXTROMETHORPHAN POLISTIREX 30 MG/5ML
60 SUSPENSION ORAL EVERY 12 HOURS
Qty: 89 ML | Refills: 0 | Status: SHIPPED | OUTPATIENT
Start: 2023-04-17

## 2023-04-17 RX ORDER — PREDNISONE 20 MG/1
20 TABLET ORAL DAILY
Qty: 5 TABLET | Refills: 0 | Status: SHIPPED | OUTPATIENT
Start: 2023-04-17 | End: 2023-04-17

## 2023-04-17 RX ORDER — LIDOCAINE 30 MG/G
1 CREAM TOPICAL 2 TIMES DAILY
Qty: 85 G | Refills: 3 | Status: SHIPPED | OUTPATIENT
Start: 2023-04-17

## 2023-04-17 RX ORDER — PREDNISONE 20 MG/1
20 TABLET ORAL DAILY
Qty: 5 TABLET | Refills: 0 | Status: SHIPPED | OUTPATIENT
Start: 2023-04-17 | End: 2023-05-01

## 2023-04-17 RX ADMIN — IPRATROPIUM BROMIDE AND ALBUTEROL SULFATE 3 ML: 2.5; .5 SOLUTION RESPIRATORY (INHALATION) at 10:04

## 2023-04-17 NOTE — ASSESSMENT & PLAN NOTE
The current medical regimen is effective;  continue present plan and medications.    Agrees to DM HTN program. Doesn't check BP at home. Compliant with meds.   Losartan, HCTZ    DASH diet/exercise

## 2023-04-17 NOTE — ASSESSMENT & PLAN NOTE
The current medical regimen is effective;  continue present plan and medications.    Seasonal allergies could be trigger of asthma along with smoking, wt, etc.     Continue with sinus rinses, astelin spray or flonase, antihistamine.

## 2023-04-17 NOTE — ASSESSMENT & PLAN NOTE
The current medical regimen is effective;  continue present plan and medications.    Denies SI/HI    Effexor, seroquel

## 2023-04-17 NOTE — ASSESSMENT & PLAN NOTE
I counseled the patient on smoking cessation for >3 min, risks associated with smoking, having a quit date, nicotine replacement, medications that can aid in cessation, and having a plan for future cravings.  Agrees again to smoking cessation

## 2023-04-17 NOTE — ASSESSMENT & PLAN NOTE
Singulair, duoneb, albuterol, symbicort    Asthma exacerbation with cough and SOB x 6 days. No relief from breathing tx or inhaler per pt

## 2023-04-17 NOTE — ASSESSMENT & PLAN NOTE
The current medical regimen is effective;  continue present plan and medications.    States well controlled at home. BG reading this morning was 111.     Glipizide and mounjaro (due to start today, just switched from ezempic).    Education given regarding prednisone regimen and monitoring BG

## 2023-04-17 NOTE — PROGRESS NOTES
HPI     Chief Complaint:  Chief Complaint   Patient presents with    Cough    Asthma       Yanni Kaiser is a 52 y.o. female with multiple medical diagnoses as listed in the medical history and problem list that presents for cough and SOB.  Pt is new to me but seen in clinic with last appointment 12/23/2022.      Cough  Associated symptoms include chest pain, headaches, nasal congestion, postnasal drip, rhinorrhea, shortness of breath and wheezing. Pertinent negatives include no chills, ear congestion, ear pain, fever or sore throat. Her past medical history is significant for asthma.   Asthma  She complains of cough, shortness of breath and wheezing. This is a recurrent problem. The current episode started in the past 7 days. Asthma causes daytime symptoms monthly. The problem has been gradually worsening. The cough is productive of sputum, productive of purulent sputum and hoarse. Associated symptoms include chest pain, dyspnea on exertion, headaches, nasal congestion, postnasal drip, rhinorrhea and trouble swallowing. Pertinent negatives include no ear congestion, ear pain, fever, sneezing or sore throat. Her symptoms are aggravated by pollen and exposure to smoke. Her past medical history is significant for asthma.       PND triggering asthma exacerbation. X 1 week with sinus drip, cough, sinus pressure. Using rescue inhaler 2-3 times per day and not getting much relief. Used machine twice yesterday with minimal relief. Only rescue inhaler today. No fever but having night sweats, nothing abnormal or new. Productive cough with yellow/phlegm. No blood in phlegm. Gets asthma exacerbations when seasons change. S/s worse at night, cough and SOB worsen. Smokes cigarettes, hard to smoke now because of SOB. Approx 1/2 pack per day. Trying to quit, previously tried smoking cessation.     Recently saw Dr. Prado (ENT) and sinuses were fine then. Doing sinus rinses without relief.     Assessment & Plan        Problem List Items Addressed This Visit          Psychiatric    Recurrent major depressive disorder, in full remission (Chronic)    Current Assessment & Plan     The current medical regimen is effective;  continue present plan and medications.    Denies SI/HI    Effexor, seroquel              ENT    Sinusitis    Current Assessment & Plan     The current medical regimen is effective;  continue present plan and medications.    Seasonal allergies could be trigger of asthma along with smoking, wt, etc.     Continue with sinus rinses, astelin spray or flonase, antihistamine.               Pulmonary    Moderate persistent asthma without complication - Primary    Current Assessment & Plan     Singulair, duoneb, albuterol, symbicort    Asthma exacerbation with cough and SOB x 6 days. No relief from breathing tx or inhaler per pt    Avoid triggers, education provided  Quit smoking  Use duo-neb and albuterol inhaler prn       Relevant Medications    predniSONE (DELTASONE) 20 MG tablet       Cardiac/Vascular    Essential hypertension (Chronic)    Current Assessment & Plan     The current medical regimen is effective;  continue present plan and medications.    Agrees to DM HTN program. Doesn't check BP at home. Compliant with meds.   Losartan, HCTZ    DASH diet/exercise           Relevant Orders    [facility] (Completed)    Hypertension Digital Medicine ([facility]): Assign Onboarding Questionnaires (Completed)       Endocrine    Type 2 diabetes mellitus with hyperglycemia, without long-term current use of insulin    Current Assessment & Plan     The current medical regimen is effective;  continue present plan and medications.    States well controlled at home. BG reading this morning was 111.     Glipizide and mounjaro (due to start today, just switched from ezempic).    Education given regarding prednisone regimen and monitoring BG               Other    Cigarette nicotine dependence  without complication    Current Assessment & Plan     I counseled the patient on smoking cessation for >3 min, risks associated with smoking, having a quit date, nicotine replacement, medications that can aid in cessation, and having a plan for future cravings.  Agrees again to smoking cessation             Other Visit Diagnoses       Cough, unspecified type      -continue asthma mgmt  -delsym prn for cough especially for night cough  -or coricidin HBP   -Fluids, symptom mgmt  -xray given chest discomfort to r/o pneumonia    Relevant Medications    albuterol-ipratropium 2.5 mg-0.5 mg/3 mL nebulizer solution 3 mL (Completed)    dextromethorphan (DELSYM 12 HOUR) 30 mg/5 mL liquid    predniSONE (DELTASONE) 20 MG tablet    Other Relevant Orders    COVID-19 Routine Screening    POCT Influenza A/B Molecular (Completed)    X-Ray Chest PA And Lateral    Needs smoking cessation education        Relevant Orders    Ambulatory referral/consult to Smoking Cessation Program            --------------------------------------------      Health Maintenance:  Health Maintenance         Date Due Completion Date    Sign Pain Contract Never done ---    COVID-19 Vaccine (4 - Booster for Pfizer series) 02/24/2022 12/30/2021    Shingles Vaccine (1 of 2) Never done ---    Eye Exam 05/30/2023 5/30/2022    Diabetes Urine Screening 06/20/2023 6/20/2022    Lipid Panel 06/20/2023 6/20/2022    Hemoglobin A1c 06/20/2023 12/20/2022    Override on 5/1/2015: Done    Mammogram 12/16/2023 12/16/2021    Foot Exam 04/13/2024 4/13/2023    Override on 3/7/2018: Done    Colorectal Cancer Screening 08/10/2026 8/10/2021    Cervical Cancer Screening 01/31/2027 1/31/2022    TETANUS VACCINE 08/19/2029 8/19/2019    Pneumococcal Vaccines (Age 0-64) (3 - PPSV23 if available, else PCV20) 08/04/2037 4/25/2017            Health maintenance reviewed.     Follow Up:  Follow up in about 2 weeks (around 5/1/2023), or if symptoms worsen or fail to improve. Digital Medicine  "HTN program to mange blood pressure closely    Discussed DDx, condition, and treatment.   Education sent to patient portal/included in after visit summary.  ED precautions given.   Notify provider if symptoms do not resolve or increase in severity.   Patient verbalizes understanding and agrees with plan of care.    Exam     Review of Systems:  (as noted above)  Review of Systems   Constitutional:  Negative for chills and fever.   HENT:  Positive for postnasal drip, rhinorrhea, sinus pressure and trouble swallowing. Negative for ear pain, sneezing and sore throat.    Respiratory:  Positive for cough, chest tightness, shortness of breath and wheezing.    Cardiovascular:  Positive for chest pain and dyspnea on exertion.   Neurological:  Positive for headaches.     Physical Exam:   Physical Exam  Constitutional:       Appearance: Normal appearance. She is obese.   HENT:      Nose: Congestion present.   Cardiovascular:      Rate and Rhythm: Normal rate and regular rhythm.      Pulses: Normal pulses.      Heart sounds: Normal heart sounds.   Pulmonary:      Effort: Tachypnea present. No respiratory distress.      Breath sounds: Decreased air movement present. Examination of the right-lower field reveals decreased breath sounds. Examination of the left-lower field reveals decreased breath sounds. Decreased breath sounds and wheezing present.   Musculoskeletal:      Cervical back: Normal range of motion and neck supple.   Skin:     General: Skin is warm and dry.      Capillary Refill: Capillary refill takes less than 2 seconds.   Neurological:      Mental Status: She is alert and oriented to person, place, and time.   Psychiatric:         Mood and Affect: Mood normal.     Vitals:    04/17/23 1007   BP: (!) 140/86   BP Location: Right arm   Patient Position: Sitting   BP Method: Large (Manual)   Pulse: 81   Temp: 97.9 °F (36.6 °C)   TempSrc: Oral   SpO2: 99%   Weight: (!) 168.6 kg (371 lb 11.1 oz)   Height: 5' 4" (1.626 m) "      Body mass index is 63.8 kg/m².        History     Past Medical History:  Past Medical History:   Diagnosis Date    Allergy     Anemia     Anxiety     Asthma     Back pain     Chronic bronchitis     Chronic obstructive pulmonary disease, unspecified COPD type 2022    Cigarette smoker     DDD (degenerative disc disease), lumbar 2020    Depression     Diabetes mellitus with peripheral circulatory disorder 2022    Diabetes with neurologic complications     DUB (dysfunctional uterine bleeding) 10/16/2018    GERD (gastroesophageal reflux disease)     High cholesterol     Hyperprolactinemia     Hypertension     Influenza A 2020    Neuromuscular disorder     Obese body habitus     Obesity     Pseudotumor cerebri     Renal manifestation of secondary diabetes mellitus     Respiratory failure     Seizures     Simple endometrial hyperplasia     Sleep apnea     Smoker     Tobacco dependence     Urinary incontinence        Past Surgical History:  Past Surgical History:   Procedure Laterality Date    ANTROSTOMY OF MAXILLARY SINUS AT INFERIOR NASAL MEATUS  10/22/2021    Procedure: MAXILLARY ANTROSTOMY, AT INFERIOR NASAL MEATUS;  Surgeon: John Prado III, MD;  Location: [facility] OR;  Service: ENT;;    BREAST CYST ASPIRATION       SECTION      X 1    CHOLECYSTECTOMY      COLONOSCOPY      COLONOSCOPY N/A 2019    Procedure: COLONOSCOPY;  Surgeon: Jagruti Sweeney MD;  Location: [address]);  Service: Endoscopy;  Laterality: N/A;  BMI is 63/gastroparesis/3 days full liquid diet 1 day clear liquid see telephone encounter by Ciara limon    EPIDURAL STEROID INJECTION N/A 2020    Procedure: INJECTION, STEROID, EPIDURAL, L5-S1 IL;  Surgeon: Lawrence Marin MD;  Location: [facility]IN [facility]GT;  Service: Pain Management;  Laterality: N/A;    ESOPHAGEAL MANOMETRY WITH MEASUREMENT OF IMPEDANCE N/A 2019    Procedure: MANOMETRY-ESOPHAGEAL-WITH IMPEDANCE;  Surgeon: Jagruti Sweeney MD;   Location: [facility] (2ND FLR);  Service: Endoscopy;  Laterality: N/A;  Hold Narcotics x 1 days   Hold TCA x 1 days  Propofol only. No fentanyl or benzodiazepine during sedation. If additional sedation needed, discuss with Dr. Sweeney.  10/29 - pt confirmed appt    ESOPHAGOGASTRODUODENOSCOPY N/A 01/14/2019    Procedure: EGD (ESOPHAGOGASTRODUODENOSCOPY);  Surgeon: Jagruti Sweeney MD;  Location: [facility] (2ND FLR);  Service: Endoscopy;  Laterality: N/A;  BMI is 63/gastroparesis/3 days full liquid diet 1 day clear liquid see telephone encounter by Ciara limon    ESOPHAGOGASTRODUODENOSCOPY N/A 11/05/2019    Procedure: ESOPHAGOGASTRODUODENOSCOPY (EGD);  Surgeon: Jagruti Sweeney MD;  Location: [facility] (2ND FLR);  Service: Endoscopy;  Laterality: N/A;  EGD with EndoFlip /+/- Botox  2nd floor for gastroparesis/BMI 63 **367lbs**  Bariatric Stretcher needed  Manometry probe to be placed endoscopically during EGD procedure  Due to change in protocol, Full liquid diet for 3 days/ 1 day of clear liquids  Hi    ESOPHAGOGASTRODUODENOSCOPY N/A 12/14/2020    Procedure: ESOPHAGOGASTRODUODENOSCOPY (EGD) with BRAVO;  Surgeon: Jagruti Sweeney MD;  Location: [facility] (2ND FLR);  Service: Endoscopy;  Laterality: N/A;  with Dilation  2nd floor due to BMI 56.20 (327 lbs) and gastroparesis  3 days full liquid diet and 1 day clears    ESOPHAGOGASTRODUODENOSCOPY N/A 04/15/2021    Procedure: ESOPHAGOGASTRODUODENOSCOPY (EGD);  Surgeon: Jagruti Sweeney MD;  Location: [facility] (2ND FLR);  Service: Endoscopy;  Laterality: N/A;  Endoflip  2nd floor-gastroparesis, BMI 57.18, bariatric stretcher  full liquid diet x3 days, clear liquid diet x1 day prior to procedure  propofol only  covid test 4/12 primary care, instructions emailed-[facility]    FOOT FRACTURE SURGERY Left     FUNCTIONAL ENDOSCOPIC SINUS SURGERY (FESS) USING COMPUTER-ASSISTED NAVIGATION Bilateral 10/22/2021    Procedure: FESS, USING COMPUTER-ASSISTED NAVIGATION;  Surgeon:  John Prado III, MD;  Location: [facility];  Service: ENT;  Laterality: Bilateral;  FOLLOW DR PRADO ANESTHESIA PROTOCAL / pt will stay 23 hour post surgery for SHARATH observation    HYSTEROSCOPY WITH DILATION AND CURETTAGE OF UTERUS N/A 10/16/2018    KJB    INJECTION OF ANESTHETIC AGENT AROUND NERVE Bilateral 10/23/2020    Procedure: BLOCK, NERVE  bilateral L3,4,5 MBB;  Surgeon: Lawrence Marin MD;  Location: [facility] PAIN [facility]GT;  Service: Pain Management;  Laterality: Bilateral;  bilateral L3,4,5 MBB   consent needed    INJECTION OF ANESTHETIC AGENT AROUND NERVE Bilateral 02/18/2022    Procedure: BLOCK, NERVE, L3-L4-L5 MEDIAL BRANCH;  Surgeon: Lawrence Marin MD;  Location: [facility]IN [facility]GT;  Service: Pain Management;  Laterality: Bilateral;    PLANTAR FASCIOTOMY Left 11/18/2019    Procedure: FASCIOTOMY, PLANTAR;  Surgeon: Valentino Orourke DPM;  Location: [address];  Service: Podiatry;  Laterality: Left;    RETINAL LASER PROCEDURE Left     SINUS SURGERY      SPHENOIDECTOMY Bilateral 10/22/2021    Procedure: SPHENOIDECTOMY;  Surgeon: John Prado III, MD;  Location: [facility];  Service: ENT;  Laterality: Bilateral;    TRANSFORAMINAL EPIDURAL INJECTION OF STEROID Bilateral 06/24/2020    Procedure: INJECTION, STEROID, EPIDURAL, TRANSFORAMINAL APPROACH;  Surgeon: Lawrence Marin MD;  Location: [facility]IN [facility];  Service: Pain Management;  Laterality: Bilateral;    TRANSFORAMINAL EPIDURAL INJECTION OF STEROID Bilateral 01/28/2022    Procedure: INJECTION, STEROID, EPIDURAL, TRANSFORAMINAL APPROACH  Bilateral TFESI L4/L5      NEEDS CONSENT;  Surgeon: Lawrence Marin MD;  Location: [facility];  Service: Pain Management;  Laterality: Bilateral;    TRANSFORAMINAL EPIDURAL INJECTION OF STEROID Bilateral 12/23/2022    Procedure: INJECTION, STEROID, EPIDURAL, TRANSFORAMINAL APPROACH BILATERAL L4/L5 CONTRAST  NEEDS CONSENT;  Surgeon: Lawrence Marin MD;  Location: [facility];  Service: Pain Management;  Laterality:  "Bilateral;    UPPER GASTROINTESTINAL ENDOSCOPY         Social History:  Social History     Socioeconomic History    Marital status:     Number of children: 4   Occupational History    Occupation: disable   Tobacco Use    Smoking status: Every Day     Packs/day: 0.25     Years: 27.00     Pack years: 6.75     Types: Cigarettes     Start date: 1/1/1992     Passive exposure: Current    Smokeless tobacco: Never    Tobacco comments:     "1/2 pack per every 2 days or so"           10/21/22 patient says its less than half a pack now. Has been cutting back.   Substance and Sexual Activity    Alcohol use: Yes     Alcohol/week: 1.0 standard drink     Types: 1 Shots of liquor per week     Comment: occasionally    Drug use: No    Sexual activity: Yes     Partners: Male     Birth control/protection: None     Comment:      Social Determinants of Health     Financial Resource Strain: Medium Risk    Difficulty of Paying Living Expenses: Somewhat hard   Food Insecurity: Food Insecurity Present    Worried About Running Out of Food in the Last Year: Sometimes true    Ran Out of Food in the Last Year: Sometimes true   Transportation Needs: Unmet Transportation Needs    Lack of Transportation (Medical): Yes    Lack of Transportation (Non-Medical): Yes   Physical Activity: Inactive    Days of Exercise per Week: 0 days    Minutes of Exercise per Session: 0 min   Stress: Stress Concern Present    Feeling of Stress : To some extent   Social Connections: Unknown    Frequency of Communication with Friends and Family: Three times a week    Frequency of Social Gatherings with Friends and Family: Once a week    Active Member of Clubs or Organizations: Yes    Attends Club or Organization Meetings: More than 4 times per year    Marital Status:    Housing Stability: High Risk    Unable to Pay for Housing in the Last Year: Yes    Number of Places Lived in the Last Year: 1    Unstable Housing in the Last Year: No       Family " History:  Family History   Problem Relation Age of Onset    Hypertension Mother     Diabetes Mother     Colon cancer Mother 50    Colon polyps Mother     Cataracts Mother     Heart disease Father     COPD Father     Heart attack Father     Hypertension Father     Ulcers Father     Cataracts Father     Glaucoma Father     No Known Problems Brother     Diabetes Daughter         prediabetes    Diabetes Daughter         prediabetic    Hypertension Daughter     Obesity Daughter     No Known Problems Daughter     No Known Problems Son     No Known Problems Maternal Aunt     No Known Problems Maternal Uncle     No Known Problems Paternal Aunt     No Known Problems Paternal Uncle     Cancer Maternal Grandmother     Breast cancer Maternal Grandmother     Colon cancer Maternal Grandmother     Colon polyps Maternal Grandmother     No Known Problems Maternal Grandfather     No Known Problems Paternal Grandmother     No Known Problems Paternal Grandfather     Celiac disease Neg Hx     Cirrhosis Neg Hx     Crohn's disease Neg Hx     Cystic fibrosis Neg Hx     Esophageal cancer Neg Hx     Hemochromatosis Neg Hx     Inflammatory bowel disease Neg Hx     Irritable bowel syndrome Neg Hx     Liver cancer Neg Hx     Liver disease Neg Hx     Rectal cancer Neg Hx     Stomach cancer Neg Hx     Ulcerative colitis Neg Hx     Jonnie's disease Neg Hx     Tuberculosis Neg Hx     Lymphoma Neg Hx     Scleroderma Neg Hx     Rheum arthritis Neg Hx     Melanoma Neg Hx     Multiple sclerosis Neg Hx     Psoriasis Neg Hx     Lupus Neg Hx     Skin cancer Neg Hx     Amblyopia Neg Hx     Blindness Neg Hx     Macular degeneration Neg Hx     Retinal detachment Neg Hx     Strabismus Neg Hx     Stroke Neg Hx     Thyroid disease Neg Hx     Allergic rhinitis Neg Hx     Allergies Neg Hx     Angioedema Neg Hx     Asthma Neg Hx     Eczema Neg Hx     Immunodeficiency Neg Hx     Rhinitis Neg Hx     Urticaria Neg Hx     Atopy Neg Hx        Allergies and Medications:  (updated and reviewed)  Review of patient's allergies indicates:   Allergen Reactions    Gabapentin Other (See Comments)     Makes her twitch     Current Outpatient Medications   Medication Sig Dispense Refill    albuterol (PROVENTIL/VENTOLIN HFA) 90 mcg/actuation inhaler Inhale 2 puffs into the lungs every 6 (six) hours as needed for Wheezing or Shortness of Breath. Rescue 54 g 6    albuterol-ipratropium (DUO-NEB) 2.5 mg-0.5 mg/3 mL nebulizer solution Take 3 mLs by nebulization every 6 (six) hours as needed for Wheezing. Rescue 75 mL 2    amitriptyline (ELAVIL) 25 MG tablet Take 1 tablet (25 mg total) by mouth nightly as needed for Insomnia. 90 tablet 1    ammonium lactate 12 % Crea Apply twice daily to affected parts both feet as needed. 140 g 11    azelastine (ASTELIN) 137 mcg (0.1 %) nasal spray 2 sprays (274 mcg total) by Nasal route 2 (two) times daily. 60 mL 11    blood sugar diagnostic (BLOOD GLUCOSE TEST) Strp 1 strip by Misc.(Non-Drug; Combo Route) route 2 (two) times a day. Insurance covers 100 each 11    blood-glucose meter kit Insurance covers 1 each 0    budesonide-formoterol 160-4.5 mcg (SYMBICORT) 160-4.5 mcg/actuation HFAA INHALE 2 PUFFS INTO THE LUNGS EVERY 12 HOURS 30.6 g 3    cetirizine (ZYRTEC) 10 MG tablet Take 1 tablet (10 mg total) by mouth daily as needed for Allergies (itching). 90 tablet 1    clobetasoL (TEMOVATE) 0.05 % external solution Apply topically 2 (two) times daily. Prn scalp itch or rash 60 mL 1    cyclobenzaprine (FLEXERIL) 10 MG tablet 10 mg 3 (three) times daily as needed.      diclofenac sodium (VOLTAREN) 1 % Gel Apply 2 g topically 3 (three) times daily. Apply to painful joints 300 g 2    fluticasone propionate (FLONASE) 50 mcg/actuation nasal spray 2 sprays (100 mcg total) by Each Nostril route 2 (two) times a day. 32 g 11    glipiZIDE (GLUCOTROL) 10 MG TR24 Take 1 tablet (10 mg total) by mouth daily with breakfast. 90 tablet 3    hydroCHLOROthiazide (HYDRODIURIL) 12.5 MG  Tab Use daily as needed for leg swelling, or if BP >140/90 with losartan 30 tablet 2    levocetirizine (XYZAL) 5 MG tablet Take 1 tablet (5 mg total) by mouth every evening. 90 tablet 1    LIDOcaine HCL 2% (XYLOCAINE) 2 % jelly Apply topically as needed. Apply topically once nightly to affected part of foot/feet. 30 mL 2    LINZESS 145 mcg Cap capsule Take 1 capsule (145 mcg total) by mouth once daily. 90 capsule 1    losartan (COZAAR) 100 MG tablet Take 1 tablet (100 mg total) by mouth once daily. 90 tablet 0    montelukast (SINGULAIR) 10 mg tablet Take 1 tablet (10 mg total) by mouth every evening. 90 tablet 1    nicotine (NICODERM CQ) 21 mg/24 hr Place 1 patch onto the skin once daily. 28 patch 0    nystatin-triamcinolone (MYCOLOG) ointment Apply topically 2 (two) times daily. 60 g 2    ondansetron (ZOFRAN) 8 MG tablet Take 1 tablet (8 mg total) by mouth every 8 (eight) hours as needed for Nausea. 90 tablet 11    oxyCODONE-acetaminophen (PERCOCET) 7.5-325 mg per tablet Take 1 tablet by mouth 3 (three) times daily as needed for Pain. 90 tablet 0    pantoprazole (PROTONIX) 40 MG tablet Take 1 tablet (40 mg total) by mouth 2 (two) times daily. 180 tablet 1    QUEtiapine (SEROQUEL) 25 MG Tab Take 1 tablet (25 mg total) by mouth once daily. 90 tablet 1    sucralfate (CARAFATE) 100 mg/mL suspension Take 1 g by mouth 3 (three) times daily.      sulindac (CLINORIL) 200 MG Tab Take 1 tablet (200 mg total) by mouth 2 (two) times daily. 30 tablet 0    tirzepatide (MOUNJARO) 2.5 mg/0.5 mL PnIj Inject 2.5 mg into the skin every 7 days. for 4 doses 4 pen 0    [START ON 5/12/2023] tirzepatide (MOUNJARO) 5 mg/0.5 mL PnIj Inject 5 mg into the skin every 7 days. for 4 doses 4 pen 0    [START ON 6/9/2023] tirzepatide (MOUNJARO) 7.5 mg/0.5 mL PnIj Inject 7.5 mg into the skin every 7 days. for 4 doses 4 pen 0    topiramate (TOPAMAX) 100 MG tablet Take 1.5 tablets (150 mg total) by mouth every evening. 180 tablet 1    triamcinolone  acetonide 0.1% (KENALOG) 0.1 % cream Apply topically 2 (two) times daily. Prn focally for rash spots on forehead and legs.Stop using steroid topical when skin is smooth and non itchy.  Do not treat dark or red coloring. 45 g 0    venlafaxine (EFFEXOR-XR) 75 MG 24 hr capsule Take 1 capsule (75 mg total) by mouth once daily. 90 capsule 1    dextromethorphan (DELSYM 12 HOUR) 30 mg/5 mL liquid Take 10 mLs (60 mg total) by mouth every 12 (twelve) hours. 89 mL 0    predniSONE (DELTASONE) 20 MG tablet Take 1 tablet (20 mg total) by mouth once daily. 5 tablet 0    pregabalin (LYRICA) 50 MG capsule Take 1 capsule (50 mg total) by mouth 3 (three) times daily. 90 capsule 3     Current Facility-Administered Medications   Medication Dose Route Frequency Provider Last Rate Last Admin    albuterol inhaler 2 puff  2 puff Inhalation Q20 Min PRN Carlyle Gordillo MD        EPINEPHrine (EPIPEN) 0.3 mg/0.3 mL pen injection 0.3 mg  0.3 mg Intramuscular PRN Carlyle Gordillo MD           Patient Care Team:  Carlyle Gordillo MD as PCP - General (Family Medicine)  Deepa Salcedo MD as Consulting Physician (Bariatrics)  Elisha Huber CTTS as Day Respiratory Care Practitioner (CTTS)  Rosa Bosch OD as Consulting Physician (Optometry)  Jagruti Sweeney MD as Consulting Physician (Gastroenterology)  KAMILA Crawford as Nurse Practitioner (Pain Medicine)  Glenn Black Jr., MD as Obstetrician (Obstetrics)  Satya Marte MD as Consulting Physician (Neurology)  Mona Barrow LPN as Care Coordinator  Satya Lopez MD as Consulting Physician (Gastroenterology)         - The patient is given an After Visit Summary that lists all medications with directions, allergies, education, orders placed during this encounter and follow-up instructions.      - I have reviewed the patient's medical information including past medical, family, and social history sections including the medications and allergies.      - We discussed  the patient's current medications.     This note was created by combination of typed  and MModal dictation.  Transcription errors may be present.  If there are any questions, please contact me.

## 2023-04-17 NOTE — PATIENT INSTRUCTIONS
Albuterol inhaler 2 puffs q20min up to 4 times, then every 4 hours as needed    You can also purchase Coricidin HBP.     Take the delsym every 12 hours as needed for cough.     Drinking plenty of fluids.     Stop smoking during exacerbations especially.

## 2023-04-18 ENCOUNTER — PATIENT MESSAGE (OUTPATIENT)
Dept: PHYSICAL MEDICINE AND REHAB | Facility: CLINIC | Age: 53
End: 2023-04-18

## 2023-04-18 LAB — SARS-COV-2 RNA RESP QL NAA+PROBE: NOT DETECTED

## 2023-04-25 ENCOUNTER — HOSPITAL ENCOUNTER (EMERGENCY)
Facility: HOSPITAL | Age: 53
Discharge: HOME | End: 2023-04-25
Attending: EMERGENCY MEDICINE
Payer: MEDICARE

## 2023-04-25 ENCOUNTER — OFFICE VISIT (OUTPATIENT)
Dept: ALLERGY | Facility: CLINIC | Age: 53
End: 2023-04-25
Payer: MEDICARE

## 2023-04-25 VITALS
OXYGEN SATURATION: 99 % | DIASTOLIC BLOOD PRESSURE: 98 MMHG | HEIGHT: 64 IN | HEART RATE: 68 BPM | TEMPERATURE: 99 F | SYSTOLIC BLOOD PRESSURE: 149 MMHG | WEIGHT: 293 LBS | RESPIRATION RATE: 16 BRPM | BODY MASS INDEX: 50.02 KG/M2

## 2023-04-25 VITALS
WEIGHT: 293 LBS | BODY MASS INDEX: 50.02 KG/M2 | DIASTOLIC BLOOD PRESSURE: 87 MMHG | OXYGEN SATURATION: 99 % | SYSTOLIC BLOOD PRESSURE: 138 MMHG | HEART RATE: 73 BPM | HEIGHT: 64 IN

## 2023-04-25 DIAGNOSIS — R06.00 DYSPNEA, UNSPECIFIED TYPE: Primary | ICD-10-CM

## 2023-04-25 DIAGNOSIS — R06.02 SOB (SHORTNESS OF BREATH): ICD-10-CM

## 2023-04-25 DIAGNOSIS — J45.901 EXACERBATION OF ASTHMA, UNSPECIFIED ASTHMA SEVERITY, UNSPECIFIED WHETHER PERSISTENT: Primary | ICD-10-CM

## 2023-04-25 LAB
ALBUMIN SERPL BCP-MCNC: 3.3 G/DL (ref 3.5–5.2)
ALP SERPL-CCNC: 76 U/L (ref 55–135)
ALT SERPL W/O P-5'-P-CCNC: 19 U/L (ref 10–44)
ANION GAP SERPL CALC-SCNC: 5 MMOL/L (ref 8–16)
AST SERPL-CCNC: 14 U/L (ref 10–40)
BASOPHILS # BLD AUTO: 0.05 K/UL (ref 0–0.2)
BASOPHILS NFR BLD: 1 % (ref 0–1.9)
BILIRUB SERPL-MCNC: 0.4 MG/DL (ref 0.1–1)
BNP SERPL-MCNC: 16 PG/ML (ref 0–99)
BUN SERPL-MCNC: 11 MG/DL (ref 6–20)
CALCIUM SERPL-MCNC: 8.7 MG/DL (ref 8.7–10.5)
CHLORIDE SERPL-SCNC: 111 MMOL/L (ref 95–110)
CO2 SERPL-SCNC: 26 MMOL/L (ref 23–29)
CREAT SERPL-MCNC: 1.1 MG/DL (ref 0.5–1.4)
DIFFERENTIAL METHOD: ABNORMAL
EOSINOPHIL # BLD AUTO: 0.6 K/UL (ref 0–0.5)
EOSINOPHIL NFR BLD: 12.4 % (ref 0–8)
ERYTHROCYTE [DISTWIDTH] IN BLOOD BY AUTOMATED COUNT: 14.3 % (ref 11.5–14.5)
EST. GFR  (NO RACE VARIABLE): >60 ML/MIN/1.73 M^2
GLUCOSE SERPL-MCNC: 82 MG/DL (ref 70–110)
HCT VFR BLD AUTO: 39.5 % (ref 37–48.5)
HCV AB SERPL QL IA: NORMAL
HGB BLD-MCNC: 12.5 G/DL (ref 12–16)
HIV 1+2 AB+HIV1 P24 AG SERPL QL IA: NORMAL
IMM GRANULOCYTES # BLD AUTO: 0.03 K/UL (ref 0–0.04)
IMM GRANULOCYTES NFR BLD AUTO: 0.6 % (ref 0–0.5)
LYMPHOCYTES # BLD AUTO: 1.8 K/UL (ref 1–4.8)
LYMPHOCYTES NFR BLD: 36.5 % (ref 18–48)
MCH RBC QN AUTO: 30.8 PG (ref 27–31)
MCHC RBC AUTO-ENTMCNC: 31.6 G/DL (ref 32–36)
MCV RBC AUTO: 97 FL (ref 82–98)
MONOCYTES # BLD AUTO: 0.5 K/UL (ref 0.3–1)
MONOCYTES NFR BLD: 10.8 % (ref 4–15)
NEUTROPHILS # BLD AUTO: 1.9 K/UL (ref 1.8–7.7)
NEUTROPHILS NFR BLD: 38.7 % (ref 38–73)
NRBC BLD-RTO: 0 /100 WBC
PLATELET # BLD AUTO: 260 K/UL (ref 150–450)
PMV BLD AUTO: 8.5 FL (ref 9.2–12.9)
POTASSIUM SERPL-SCNC: 4 MMOL/L (ref 3.5–5.1)
PROT SERPL-MCNC: 6.8 G/DL (ref 6–8.4)
RBC # BLD AUTO: 4.06 M/UL (ref 4–5.4)
SODIUM SERPL-SCNC: 142 MMOL/L (ref 136–145)
TROPONIN I SERPL DL<=0.01 NG/ML-MCNC: <0.006 NG/ML (ref 0–0.03)
WBC # BLD AUTO: 4.99 K/UL (ref 3.9–12.7)

## 2023-04-25 PROCEDURE — 3008F PR BODY MASS INDEX (BMI) DOCUMENTED: ICD-10-PCS | Mod: HCNC,CPTII,GC,S$GLB | Performed by: STUDENT IN AN ORGANIZED HEALTH CARE EDUCATION/TRAINING PROGRAM

## 2023-04-25 PROCEDURE — 99285 EMERGENCY DEPT VISIT HI MDM: CPT | Mod: HCNC,,, | Performed by: EMERGENCY MEDICINE

## 2023-04-25 PROCEDURE — 3079F DIAST BP 80-89 MM HG: CPT | Mod: HCNC,CPTII,GC,S$GLB | Performed by: STUDENT IN AN ORGANIZED HEALTH CARE EDUCATION/TRAINING PROGRAM

## 2023-04-25 PROCEDURE — 80053 COMPREHEN METABOLIC PANEL: CPT | Mod: HCNC | Performed by: PHYSICIAN ASSISTANT

## 2023-04-25 PROCEDURE — 99999 PR PBB SHADOW E&M-EST. PATIENT-LVL III: ICD-10-PCS | Mod: PBBFAC,HCNC,GC, | Performed by: STUDENT IN AN ORGANIZED HEALTH CARE EDUCATION/TRAINING PROGRAM

## 2023-04-25 PROCEDURE — 83880 ASSAY OF NATRIURETIC PEPTIDE: CPT | Mod: HCNC | Performed by: PHYSICIAN ASSISTANT

## 2023-04-25 PROCEDURE — 93010 EKG 12-LEAD: ICD-10-PCS | Mod: HCNC,,, | Performed by: INTERNAL MEDICINE

## 2023-04-25 PROCEDURE — 94640 AIRWAY INHALATION TREATMENT: CPT | Mod: HCNC,XB

## 2023-04-25 PROCEDURE — 3079F PR MOST RECENT DIASTOLIC BLOOD PRESSURE 80-89 MM HG: ICD-10-PCS | Mod: HCNC,CPTII,GC,S$GLB | Performed by: STUDENT IN AN ORGANIZED HEALTH CARE EDUCATION/TRAINING PROGRAM

## 2023-04-25 PROCEDURE — 4010F PR ACE/ARB THEARPY RXD/TAKEN: ICD-10-PCS | Mod: HCNC,CPTII,GC,S$GLB | Performed by: STUDENT IN AN ORGANIZED HEALTH CARE EDUCATION/TRAINING PROGRAM

## 2023-04-25 PROCEDURE — 86803 HEPATITIS C AB TEST: CPT | Mod: HCNC | Performed by: PHYSICIAN ASSISTANT

## 2023-04-25 PROCEDURE — 99215 PR OFFICE/OUTPT VISIT, EST, LEVL V, 40-54 MIN: ICD-10-PCS | Mod: HCNC,GC,S$GLB, | Performed by: STUDENT IN AN ORGANIZED HEALTH CARE EDUCATION/TRAINING PROGRAM

## 2023-04-25 PROCEDURE — 99285 PR EMERGENCY DEPT VISIT,LEVEL V: ICD-10-PCS | Mod: HCNC,,, | Performed by: EMERGENCY MEDICINE

## 2023-04-25 PROCEDURE — 94761 N-INVAS EAR/PLS OXIMETRY MLT: CPT | Mod: HCNC

## 2023-04-25 PROCEDURE — 99285 EMERGENCY DEPT VISIT HI MDM: CPT | Mod: 25,HCNC

## 2023-04-25 PROCEDURE — 4010F ACE/ARB THERAPY RXD/TAKEN: CPT | Mod: HCNC,CPTII,GC,S$GLB | Performed by: STUDENT IN AN ORGANIZED HEALTH CARE EDUCATION/TRAINING PROGRAM

## 2023-04-25 PROCEDURE — 25000242 PHARM REV CODE 250 ALT 637 W/ HCPCS: Mod: HCNC | Performed by: PHYSICIAN ASSISTANT

## 2023-04-25 PROCEDURE — 85025 COMPLETE CBC W/AUTO DIFF WBC: CPT | Mod: HCNC | Performed by: PHYSICIAN ASSISTANT

## 2023-04-25 PROCEDURE — 93010 ELECTROCARDIOGRAM REPORT: CPT | Mod: HCNC,,, | Performed by: INTERNAL MEDICINE

## 2023-04-25 PROCEDURE — 3072F PR LOW RISK FOR RETINOPATHY: ICD-10-PCS | Mod: HCNC,CPTII,GC,S$GLB | Performed by: STUDENT IN AN ORGANIZED HEALTH CARE EDUCATION/TRAINING PROGRAM

## 2023-04-25 PROCEDURE — 99215 OFFICE O/P EST HI 40 MIN: CPT | Mod: HCNC,GC,S$GLB, | Performed by: STUDENT IN AN ORGANIZED HEALTH CARE EDUCATION/TRAINING PROGRAM

## 2023-04-25 PROCEDURE — 3075F PR MOST RECENT SYSTOLIC BLOOD PRESS GE 130-139MM HG: ICD-10-PCS | Mod: HCNC,CPTII,GC,S$GLB | Performed by: STUDENT IN AN ORGANIZED HEALTH CARE EDUCATION/TRAINING PROGRAM

## 2023-04-25 PROCEDURE — 84484 ASSAY OF TROPONIN QUANT: CPT | Mod: HCNC | Performed by: PHYSICIAN ASSISTANT

## 2023-04-25 PROCEDURE — 3075F SYST BP GE 130 - 139MM HG: CPT | Mod: HCNC,CPTII,GC,S$GLB | Performed by: STUDENT IN AN ORGANIZED HEALTH CARE EDUCATION/TRAINING PROGRAM

## 2023-04-25 PROCEDURE — 93005 ELECTROCARDIOGRAM TRACING: CPT | Mod: HCNC

## 2023-04-25 PROCEDURE — 3008F BODY MASS INDEX DOCD: CPT | Mod: HCNC,CPTII,GC,S$GLB | Performed by: STUDENT IN AN ORGANIZED HEALTH CARE EDUCATION/TRAINING PROGRAM

## 2023-04-25 PROCEDURE — 63600175 PHARM REV CODE 636 W HCPCS: Mod: HCNC | Performed by: PHYSICIAN ASSISTANT

## 2023-04-25 PROCEDURE — 3072F LOW RISK FOR RETINOPATHY: CPT | Mod: HCNC,CPTII,GC,S$GLB | Performed by: STUDENT IN AN ORGANIZED HEALTH CARE EDUCATION/TRAINING PROGRAM

## 2023-04-25 PROCEDURE — 87389 HIV-1 AG W/HIV-1&-2 AB AG IA: CPT | Mod: HCNC | Performed by: PHYSICIAN ASSISTANT

## 2023-04-25 PROCEDURE — 96375 TX/PRO/DX INJ NEW DRUG ADDON: CPT | Mod: HCNC

## 2023-04-25 PROCEDURE — 99999 PR PBB SHADOW E&M-EST. PATIENT-LVL III: CPT | Mod: PBBFAC,HCNC,GC, | Performed by: STUDENT IN AN ORGANIZED HEALTH CARE EDUCATION/TRAINING PROGRAM

## 2023-04-25 PROCEDURE — 96374 THER/PROPH/DIAG INJ IV PUSH: CPT | Mod: HCNC

## 2023-04-25 RX ORDER — METHYLPREDNISOLONE SOD SUCC 125 MG
125 VIAL (EA) INJECTION
Status: COMPLETED | OUTPATIENT
Start: 2023-04-25 | End: 2023-04-25

## 2023-04-25 RX ORDER — PREDNISONE 10 MG/1
TABLET ORAL
Qty: 10 TABLET | Refills: 0 | Status: SHIPPED | OUTPATIENT
Start: 2023-04-25 | End: 2023-05-11

## 2023-04-25 RX ORDER — FUROSEMIDE 10 MG/ML
40 INJECTION INTRAMUSCULAR; INTRAVENOUS
Status: COMPLETED | OUTPATIENT
Start: 2023-04-25 | End: 2023-04-25

## 2023-04-25 RX ORDER — IPRATROPIUM BROMIDE AND ALBUTEROL SULFATE 2.5; .5 MG/3ML; MG/3ML
3 SOLUTION RESPIRATORY (INHALATION)
Status: COMPLETED | OUTPATIENT
Start: 2023-04-25 | End: 2023-04-25

## 2023-04-25 RX ADMIN — METHYLPREDNISOLONE SODIUM SUCCINATE 125 MG: 125 INJECTION, POWDER, FOR SOLUTION INTRAMUSCULAR; INTRAVENOUS at 01:04

## 2023-04-25 RX ADMIN — IPRATROPIUM BROMIDE AND ALBUTEROL SULFATE 3 ML: .5; 3 SOLUTION RESPIRATORY (INHALATION) at 12:04

## 2023-04-25 RX ADMIN — FUROSEMIDE 40 MG: 10 INJECTION, SOLUTION INTRAMUSCULAR; INTRAVENOUS at 01:04

## 2023-04-25 NOTE — ED NOTES
..Patient identifiers for Yanni Kaiser 52 y.o. female checked and correct.  Chief Complaint   Patient presents with    Shortness of Breath     Hx of asthma. Sent from allergy clinic for possible CHF exacerbation. +SOB, dyspnea, and swelling to BLE. Denies chest pain      Past Medical History:   Diagnosis Date    Allergy     Anemia     Anxiety     Asthma     Back pain     Chronic bronchitis     Chronic obstructive pulmonary disease, unspecified COPD type 4/1/2022    Cigarette smoker     DDD (degenerative disc disease), lumbar 6/9/2020    Depression     Diabetes mellitus with peripheral circulatory disorder 4/1/2022    Diabetes with neurologic complications     DUB (dysfunctional uterine bleeding) 10/16/2018    GERD (gastroesophageal reflux disease)     High cholesterol     Hyperprolactinemia     Hypertension     Influenza A 01/16/2020    Neuromuscular disorder     Obese body habitus     Obesity     Pseudotumor cerebri     Renal manifestation of secondary diabetes mellitus     Respiratory failure     Seizures     Simple endometrial hyperplasia 2018    Sleep apnea     Smoker     Tobacco dependence     Urinary incontinence      Allergies reported:   Review of patient's allergies indicates:   Allergen Reactions    Gabapentin Other (See Comments)     Makes her twitch         LOC: Patient is awake, alert, and aware of environment with an appropriate affect. Patient is oriented x 3 and speaking appropriately.  APPEARANCE: Patient resting comfortably and in no acute distress. Patient is clean and well groomed, patient's clothing is properly fastened.  HEENT: **AAO--sob for 1 month  SKIN: The skin is warm and dry. Patient has normal skin turgor and moist mucus membranes. Skin is intact; no bruising or breakdown noted.  MUSKULOSKELETAL: Patient is moving all extremities well, no obvious deformities noted. Pulses intact.   RESPIRATORY: Airway is open and patent. Respirations are spontaneous and non-labored with normal  effort and rate,  CARDIAC: Patient has a normal rate and rhythm. NSR on cardiac monitor,No peripheral edema noted.   ABDOMEN: No distention noted. Bowel sounds active in all 4 quadrants. Soft and non-tender upon palpation.  NEUROLOGICAL: pupils **mm, PERRL. Facial expression is symmetrical. Hand grasps are equal bilaterally. Normal sensation in all extremities when touched with finger.

## 2023-04-25 NOTE — FIRST PROVIDER EVALUATION
Emergency Department TeleTriage Encounter Note      CHIEF COMPLAINT    Chief Complaint   Patient presents with    Shortness of Breath     Hx of asthma. Sent from allergy clinic for possible CHF exacerbation. +SOB, dyspnea, and swelling to BLE. Denies chest pain        VITAL SIGNS   Initial Vitals [04/25/23 1114]   BP Pulse Resp Temp SpO2   (!) 147/82 76 (!) 32 98.2 °F (36.8 °C) 95 %      MAP       --            ALLERGIES    Review of patient's allergies indicates:   Allergen Reactions    Gabapentin Other (See Comments)     Makes her twitch       PROVIDER TRIAGE NOTE  This is a teletriage evaluation of a 52 y.o. female presenting to the ED complaining of shortness of breath. Patient sent from allergy clinic. Patient reports worsening shortness of breath, chest pain, and bilateral leg swelling for 1 month.    Patient is alert and oriented. She speaks in complete sentences. She is sitting upright in the chair in no distress. Patient coughing frequently during teletriage.    Initial orders will be placed and care will be transferred to an alternate provider when patient is roomed for a full evaluation. Any additional orders and the final disposition will be determined by that provider.         ORDERS  Labs Reviewed   HIV 1 / 2 ANTIBODY   HEPATITIS C ANTIBODY   CBC W/ AUTO DIFFERENTIAL   COMPREHENSIVE METABOLIC PANEL   TROPONIN I   B-TYPE NATRIURETIC PEPTIDE       ED Orders (720h ago, onward)      Start Ordered     Status Ordering Provider    04/25/23 1136 04/25/23 1135  CBC Auto Differential  STAT         Ordered ANDRE ALDANA    04/25/23 1136 04/25/23 1135  Comprehensive Metabolic Panel  STAT         Ordered ANDRE ALDANA    04/25/23 1136 04/25/23 1135  Pulse Oximetry Continuous  Continuous         Ordered ANDRE ALDANA    04/25/23 1136 04/25/23 1135  Cardiac Monitoring - Adult  Continuous         Ordered ANDRE ALDANA    04/25/23 1136 04/25/23 1135  X-Ray Chest 1 View  1 time imaging         Ordered KATYA  ANDRE TONEY.    04/25/23 1136 04/25/23 1135  Troponin I  STAT         Ordered KATYAANDRE.    04/25/23 1136 04/25/23 1135  BNP  Once         Ordered KATYAANDRE.    04/25/23 1117 04/25/23 1116  HIV 1/2 Ag/Ab (4th Gen)  STAT         Acknowledged MAULIK GOMEZ    04/25/23 1117 04/25/23 1116  Hepatitis C Antibody  STAT         Acknowledged MAULIK GOMEZ    04/25/23 1117 04/25/23 1117  EKG 12-lead  Once         Completed by ALEXANDRA MATA on 4/25/2023 at 11:20 AM SIMONE VINES              Virtual Visit Note: The provider triage portion of this emergency department evaluation and documentation was performed via [email or web address], a HIPAA-compliant telemedicine application, in concert with a tele-presenter in the room. A face to face patient evaluation with one of my colleagues will occur once the patient is placed in an emergency department room.      DISCLAIMER: This note was prepared with [employer] voice recognition transcription software. Garbled syntax, mangled pronouns, and other bizarre constructions may be attributed to that software system.

## 2023-04-25 NOTE — PROGRESS NOTES
ALLERGY & IMMUNOLOGY CLINIC - FOLLOW UP     HISTORY OF PRESENT ILLNESS     Patient ID: Yanni Kaiser is a 52 y.o. female    CC: follow up    HPI: Ms. Kaiser is a 52 year old female with a history of chronic rhinosinusitus, asthma, and diastolic dysfunction who presents to clinic today for follow up. She was last seen in clinic in 2022.     Shortness of breath: Today, patient notes persistent shortness of breath. She is waking up at night with shortness of breath 1-3 times per night which persists despite albuterol use. She is unable to lay flat at night and notes increased swelling in her legs. These symptoms have worsened over the past month. She also notes cough productive of yellow sputum. No fevers. She notes that she is on HCTZ but not on lasix. She has had multiple courses of steroids for breathing since last visit, her last course of steroids was one week ago per her PCP and patient notes no improvement in shortness of breath after getting steroids. Since last visit, she has been admitted twice for shortness of breath.     AR: Currently using flonase 2 SEN BID and azelastine 1 SEN daily as well as sinus rise once daily. These medications only help temporarily. She notes continued post nasal drip and cough productive of yellow mucus.      REVIEW OF SYSTEMS     Balance of review of systems negative except as mentioned above     MEDICAL HISTORY     MedHx: active problems reviewed  SurgHx:   Past Surgical History:   Procedure Laterality Date    ANTROSTOMY OF MAXILLARY SINUS AT INFERIOR NASAL MEATUS  10/22/2021    Procedure: MAXILLARY ANTROSTOMY, AT INFERIOR NASAL MEATUS;  Surgeon: John Prado III, MD;  Location: [facility];  Service: ENT;;    BREAST CYST ASPIRATION       SECTION      X 1    CHOLECYSTECTOMY      COLONOSCOPY      COLONOSCOPY N/A 2019    Procedure: COLONOSCOPY;  Surgeon: Jagruti Sweeney MD;  Location: [facility] ([address] [place] [address]);  Service: Endoscopy;  Laterality: N/A;  BMI is  63/gastroparesis/3 days full liquid diet 1 day clear liquid see telephone encounter by Ciara Pinto [facility]    EPIDURAL STEROID INJECTION N/A 09/11/2020    Procedure: INJECTION, STEROID, EPIDURAL, L5-S1 IL;  Surgeon: Lawrence Marin MD;  Location: [facility];  Service: Pain Management;  Laterality: N/A;    ESOPHAGEAL MANOMETRY WITH MEASUREMENT OF IMPEDANCE N/A 11/05/2019    Procedure: MANOMETRY-ESOPHAGEAL-WITH IMPEDANCE;  Surgeon: Jagruti Sweeney MD;  Location: [facility] (2ND FLR);  Service: Endoscopy;  Laterality: N/A;  Hold Narcotics x 1 days   Hold TCA x 1 days  Propofol only. No fentanyl or benzodiazepine during sedation. If additional sedation needed, discuss with Dr. Sweeney.  10/29 - pt confirmed appt    ESOPHAGOGASTRODUODENOSCOPY N/A 01/14/2019    Procedure: EGD (ESOPHAGOGASTRODUODENOSCOPY);  Surgeon: Jagruti Sweeney MD;  Location: [facility] (2ND FLR);  Service: Endoscopy;  Laterality: N/A;  BMI is 63/gastroparesis/3 days full liquid diet 1 day clear liquid see telephone encounter by Ciara Pinto [facility]    ESOPHAGOGASTRODUODENOSCOPY N/A 11/05/2019    Procedure: ESOPHAGOGASTRODUODENOSCOPY (EGD);  Surgeon: Jagruti Sweeney MD;  Location: [facility] (2ND FLR);  Service: Endoscopy;  Laterality: N/A;  EGD with EndoFlip /+/- Botox  2nd floor for gastroparesis/BMI 63 **367lbs**  Bariatric Stretcher needed  Manometry probe to be placed endoscopically during EGD procedure  Due to change in protocol, Full liquid diet for 3 days/ 1 day of clear liquids  Hi    ESOPHAGOGASTRODUODENOSCOPY N/A 12/14/2020    Procedure: ESOPHAGOGASTRODUODENOSCOPY (EGD) with BRAVO;  Surgeon: Jagruti Sweeney MD;  Location: [facility] (2ND FLR);  Service: Endoscopy;  Laterality: N/A;  with Dilation  2nd floor due to BMI 56.20 (327 lbs) and gastroparesis  3 days full liquid diet and 1 day clears    ESOPHAGOGASTRODUODENOSCOPY N/A 04/15/2021    Procedure: ESOPHAGOGASTRODUODENOSCOPY (EGD);  Surgeon: Jagruti Sweeney MD;  Location: [facility]  (2ND FLR);  Service: Endoscopy;  Laterality: N/A;  Endoflip  2nd floor-gastroparesis, BMI 57.18, bariatric stretcher  full liquid diet x3 days, clear liquid diet x1 day prior to procedure  propofol only  covid test 4/12 primary care, instructions emailed-[facility]    FOOT FRACTURE SURGERY Left     FUNCTIONAL ENDOSCOPIC SINUS SURGERY (FESS) USING COMPUTER-ASSISTED NAVIGATION Bilateral 10/22/2021    Procedure: FESS, USING COMPUTER-ASSISTED NAVIGATION;  Surgeon: John Prado III, MD;  [facility];  Service: ENT;  Laterality: Bilateral;  FOLLOW DR PRADO ANESTHESIA PROTOCAL / pt will stay 23 hour post surgery for SHARATH observation    HYSTEROSCOPY WITH DILATION AND CURETTAGE OF UTERUS N/A 10/16/2018    KJB    INJECTION OF ANESTHETIC AGENT AROUND NERVE Bilateral 10/23/2020    Procedure: BLOCK, NERVE  bilateral L3,4,5 MBB;  Surgeon: Lawrence Marin MD;  Location: [facility] PAIN MGT;  Service: Pain Management;  Laterality: Bilateral;  bilateral L3,4,5 MBB   consent needed    INJECTION OF ANESTHETIC AGENT AROUND NERVE Bilateral 02/18/2022    Procedure: BLOCK, NERVE, L3-L4-L5 MEDIAL BRANCH;  Surgeon: Lawrence Marin MD;  Location: [facility] PAIN MGT;  Service: Pain Management;  Laterality: Bilateral;    PLANTAR FASCIOTOMY Left 11/18/2019    Procedure: FASCIOTOMY, PLANTAR;  Surgeon: Valentino Orourke DPM;  Location: [facility] 2ND FLR;  Service: Podiatry;  Laterality: Left;    RETINAL LASER PROCEDURE Left     SINUS SURGERY      SPHENOIDECTOMY Bilateral 10/22/2021    Procedure: SPHENOIDECTOMY;  Surgeon: John Prado III, MD;  Location: [facility];  Service: ENT;  Laterality: Bilateral;    TRANSFORAMINAL EPIDURAL INJECTION OF STEROID Bilateral 06/24/2020    Procedure: INJECTION, STEROID, EPIDURAL, TRANSFORAMINAL APPROACH;  Surgeon: Lawrence Marin MD;  Location: [facility] PAIN MGT;  Service: Pain Management;  Laterality: Bilateral;    TRANSFORAMINAL EPIDURAL INJECTION OF STEROID Bilateral 01/28/2022    Procedure: INJECTION, STEROID,  EPIDURAL, TRANSFORAMINAL APPROACH  Bilateral TFESI L4/L5      NEEDS CONSENT;  Surgeon: Lawrence Marin MD;  Location: [facility] PAIN MGT;  Service: Pain Management;  Laterality: Bilateral;    TRANSFORAMINAL EPIDURAL INJECTION OF STEROID Bilateral 12/23/2022    Procedure: INJECTION, STEROID, EPIDURAL, TRANSFORAMINAL APPROACH BILATERAL L4/L5 CONTRAST  NEEDS CONSENT;  Surgeon: Lawrence Marin MD;  Location: [facility] PAIN MGT;  Service: Pain Management;  Laterality: Bilateral;    UPPER GASTROINTESTINAL ENDOSCOPY       Allergies: see below  Medications: MAR reviewed     PHYSICAL EXAM     Vitals:    04/25/23 1002   BP: 138/87   Pulse: 73       Physical Exam  Constitutional:       Appearance: She is diaphoretic.   HENT:      Head: Normocephalic and atraumatic.      Nose:      Comments: 2-3+ pink/pale turbinates     Mouth/Throat:      Mouth: Mucous membranes are moist.   Eyes:      Extraocular Movements: Extraocular movements intact.      Conjunctiva/sclera: Conjunctivae normal.      Pupils: Pupils are equal, round, and reactive to light.   Cardiovascular:      Rate and Rhythm: Normal rate and regular rhythm.      Heart sounds: Normal heart sounds. No murmur heard.  Pulmonary:      Breath sounds: Normal breath sounds. No wheezing or rales.      Comments: Unable to lay flat on exam due to shortness of breath  Musculoskeletal:      Cervical back: Normal range of motion.      Left lower leg: Edema present.   Skin:     General: Skin is warm.      Findings: No rash.   Neurological:      General: No focal deficit present.      Mental Status: She is alert and oriented to person, place, and time.   Psychiatric:         Mood and Affect: Mood normal.         Behavior: Behavior normal.         Thought Content: Thought content normal.         Judgment: Judgment normal.        LABORATORY STUDIES     Reviewed     ALLERGEN TESTING     Component      Latest Ref Rng & Units 1/4/2018 1/4/2018 1/4/2018          10:57 AM 10:57 AM 10:57 AM   MAGALIS  farinae      <0.35 kU/L     0.43 (H)   D. farinae Class           CLASS I   Mite Dust Pteronyssinus IgE      <0.35 kU/L     0.43 (H)   D. pteronyssinus Class           CLASS I   BERMUDA GRASS      <0.35 kU/L     <0.35   Bermuda Grass Class           CLASS 0   Valentino Grass      <0.35 kU/L     <0.35   Valetnino Grass Class           CLASS 0   Milam IgE      <0.35 kU/L     <0.35   Milam Class           CLASS 0   Plantain      <0.35 kU/L     <0.35   English Plantain Class           CLASS 0   White Oak(Quercus alba) IgE      <0.35 kU/L     <0.35   Burt, Class           CLASS 0   Marshelder IgE      <0.35 kU/L     <0.35   Marshelder Class           CLASS 0   Ragweed, Western IgE      <0.35 kU/L     <0.35   Ragweed, Western, Class           CLASS 0   Alternaria alternata      <0.35 kU/L     <0.35   Altern. alternata Class           CLASS 0   Aspergillus Fumigatus IgE      <0.35 kU/L     <0.35   A. fumigatus Class           CLASS 0   Cat Dander      <0.35 kU/L     <0.35   Cat Epithelium Class           CLASS 0   Cockroach, IgE      <0.35 kU/L CLASS 0 <0.35     Dog Dander, IgE      <0.35 kU/L     <0.35   Dog Dander Class           CLASS 0   SHRIMP IGE      <0.35 kU/L     <0.35   Shrimp Class           CLASS 0   Crayfish      <0.35 kU/L     <0.35   Crayfish Class           CLASS 0   Crab      <0.35 kU/L     <0.35   Crab Class           CLASS 0   Lobster IgE      <0.35 kU/L     <0.35   Lobster Class           CLASS 0   IgE      0 - 100 IU/mL     59          PULMONARY FUNCTION     PFTs: 10/05/2022  FVC: 3.02L (98.7%)  FEV1: 2.49 L (101%)  Ratio: 101.9%  DLCO: 20.87    Interpretation: Spirometry is normal. Spirometry remains unimproved following bronchodilator. Lung volume determination is likely normal despite the redcued FRC and/ RV of uncertain clinical significance. DLCO is normal.     ASSESSMENT & PLAN     Yanni Kaiser is a 52 y.o. female with     Dyspnea: Symptoms uncontrolled. Concerned for possible volume overload  in the setting of diastolic dysfunction on echo, LE edema, unable to lay flat, haziness seen on CXR performed one week ago, and no improvement with steroids or albuterol. This is not consistent with asthma exacerbation at this time. Will send patient to ED now for further evaluation. Called ED team and given handoff. Patient will RTC in one week to continue evaluation for allergic rhinitis and asthma.     NOT DISCUSSED AT THIS VISIT:    Allergic rhinitis: Patient sensitized to HDM and trialed Odactra without improvement in symptoms and with significant oral and ear pruritus thus medication was stopped. Symptoms remain controlled on nasal sprays.   - Continue flonase 2 SEN BID and increase azelastine to 1 SEN BID  - She is not interested in AIT at this time    Asthma: PFTs performed 10/5/22 wnl. Patient was admitted twice over the past two months with viral infections (COVID-19 and influenza A) of which she is still recovering but overall much improved since discharge. Also follows with pulmonary.   - Continue daily montelukast and cetirizine  - Continue Breo 2 puffs BID  - Continue albuterol as needed for shortness of breath   - Encouraged patient to decrease duo nebs to as needed once feeling better     Follow up: 1 week    Patient also evaluated by attending, Dr. Murdock.     Candida Huerta MD  Allergy/Immunology Fellow

## 2023-04-25 NOTE — DISCHARGE INSTRUCTIONS
Suspect symptoms due to asthma exacerbation today  Continue to use Symbicort and albuterol as prescribed  You were given a course of prednisone today.  Take as directed  Please monitor your blood sugar while taking this medication as it can cause temporary hyperglycemia  Please follow-up with your allergist for your allergies and asthma  Return to the emergency department for new or worsening symptoms

## 2023-05-01 ENCOUNTER — OFFICE VISIT (OUTPATIENT)
Dept: FAMILY MEDICINE | Facility: CLINIC | Age: 53
End: 2023-05-01
Payer: MEDICARE

## 2023-05-01 VITALS
DIASTOLIC BLOOD PRESSURE: 96 MMHG | OXYGEN SATURATION: 96 % | HEART RATE: 85 BPM | BODY MASS INDEX: 50.02 KG/M2 | TEMPERATURE: 98 F | HEIGHT: 64 IN | WEIGHT: 293 LBS | SYSTOLIC BLOOD PRESSURE: 140 MMHG

## 2023-05-01 DIAGNOSIS — I27.20 MILD PULMONARY HYPERTENSION: ICD-10-CM

## 2023-05-01 DIAGNOSIS — R06.02 SHORTNESS OF BREATH: Primary | ICD-10-CM

## 2023-05-01 DIAGNOSIS — I10 ESSENTIAL HYPERTENSION: Chronic | ICD-10-CM

## 2023-05-01 DIAGNOSIS — D17.24 LIPOMA OF LEFT THIGH: ICD-10-CM

## 2023-05-01 DIAGNOSIS — J45.40 MODERATE PERSISTENT ASTHMA WITHOUT COMPLICATION: ICD-10-CM

## 2023-05-01 DIAGNOSIS — G47.33 OSA (OBSTRUCTIVE SLEEP APNEA): ICD-10-CM

## 2023-05-01 PROBLEM — M54.50 CHRONIC BILATERAL LOW BACK PAIN WITHOUT SCIATICA: Status: RESOLVED | Noted: 2020-06-11 | Resolved: 2023-05-01

## 2023-05-01 PROBLEM — Z98.890 S/P D&C (STATUS POST DILATION AND CURETTAGE): Status: RESOLVED | Noted: 2018-10-16 | Resolved: 2023-05-01

## 2023-05-01 PROBLEM — M72.2 PLANTAR FASCIITIS: Status: RESOLVED | Noted: 2019-11-18 | Resolved: 2023-05-01

## 2023-05-01 PROBLEM — G89.29 CHRONIC BILATERAL LOW BACK PAIN WITHOUT SCIATICA: Status: RESOLVED | Noted: 2020-06-11 | Resolved: 2023-05-01

## 2023-05-01 PROBLEM — M62.89 MUSCLE TIGHTNESS: Status: RESOLVED | Noted: 2020-02-26 | Resolved: 2023-05-01

## 2023-05-01 PROBLEM — N17.9 AKI (ACUTE KIDNEY INJURY): Status: RESOLVED | Noted: 2017-02-21 | Resolved: 2023-05-01

## 2023-05-01 PROBLEM — M62.81 WEAKNESS OF TRUNK MUSCULATURE: Status: RESOLVED | Noted: 2020-02-26 | Resolved: 2023-05-01

## 2023-05-01 PROBLEM — R29.898 DECREASED STRENGTH OF TRUNK AND BACK: Status: RESOLVED | Noted: 2023-01-10 | Resolved: 2023-05-01

## 2023-05-01 PROBLEM — J32.9 SINUSITIS: Status: RESOLVED | Noted: 2021-10-22 | Resolved: 2023-05-01

## 2023-05-01 PROCEDURE — 3072F PR LOW RISK FOR RETINOPATHY: ICD-10-PCS | Mod: CPTII,S$GLB,, | Performed by: INTERNAL MEDICINE

## 2023-05-01 PROCEDURE — 3077F SYST BP >= 140 MM HG: CPT | Mod: CPTII,S$GLB,, | Performed by: INTERNAL MEDICINE

## 2023-05-01 PROCEDURE — 3077F PR MOST RECENT SYSTOLIC BLOOD PRESSURE >= 140 MM HG: ICD-10-PCS | Mod: CPTII,S$GLB,, | Performed by: INTERNAL MEDICINE

## 2023-05-01 PROCEDURE — 99999 PR PBB SHADOW E&M-EST. PATIENT-LVL V: CPT | Mod: PBBFAC,,, | Performed by: INTERNAL MEDICINE

## 2023-05-01 PROCEDURE — 1160F RVW MEDS BY RX/DR IN RCRD: CPT | Mod: CPTII,S$GLB,, | Performed by: INTERNAL MEDICINE

## 2023-05-01 PROCEDURE — 99214 PR OFFICE/OUTPT VISIT, EST, LEVL IV, 30-39 MIN: ICD-10-PCS | Mod: S$GLB,,, | Performed by: INTERNAL MEDICINE

## 2023-05-01 PROCEDURE — 1159F MED LIST DOCD IN RCRD: CPT | Mod: CPTII,S$GLB,, | Performed by: INTERNAL MEDICINE

## 2023-05-01 PROCEDURE — 99214 OFFICE O/P EST MOD 30 MIN: CPT | Mod: S$GLB,,, | Performed by: INTERNAL MEDICINE

## 2023-05-01 PROCEDURE — 3080F PR MOST RECENT DIASTOLIC BLOOD PRESSURE >= 90 MM HG: ICD-10-PCS | Mod: CPTII,S$GLB,, | Performed by: INTERNAL MEDICINE

## 2023-05-01 PROCEDURE — 3072F LOW RISK FOR RETINOPATHY: CPT | Mod: CPTII,S$GLB,, | Performed by: INTERNAL MEDICINE

## 2023-05-01 PROCEDURE — 3080F DIAST BP >= 90 MM HG: CPT | Mod: CPTII,S$GLB,, | Performed by: INTERNAL MEDICINE

## 2023-05-01 PROCEDURE — 1159F PR MEDICATION LIST DOCUMENTED IN MEDICAL RECORD: ICD-10-PCS | Mod: CPTII,S$GLB,, | Performed by: INTERNAL MEDICINE

## 2023-05-01 PROCEDURE — 1160F PR REVIEW ALL MEDS BY PRESCRIBER/CLIN PHARMACIST DOCUMENTED: ICD-10-PCS | Mod: CPTII,S$GLB,, | Performed by: INTERNAL MEDICINE

## 2023-05-01 PROCEDURE — 99999 PR PBB SHADOW E&M-EST. PATIENT-LVL V: ICD-10-PCS | Mod: PBBFAC,,, | Performed by: INTERNAL MEDICINE

## 2023-05-01 PROCEDURE — 4010F ACE/ARB THERAPY RXD/TAKEN: CPT | Mod: CPTII,S$GLB,, | Performed by: INTERNAL MEDICINE

## 2023-05-01 PROCEDURE — 4010F PR ACE/ARB THEARPY RXD/TAKEN: ICD-10-PCS | Mod: CPTII,S$GLB,, | Performed by: INTERNAL MEDICINE

## 2023-05-01 PROCEDURE — 3008F BODY MASS INDEX DOCD: CPT | Mod: CPTII,S$GLB,, | Performed by: INTERNAL MEDICINE

## 2023-05-01 PROCEDURE — 3008F PR BODY MASS INDEX (BMI) DOCUMENTED: ICD-10-PCS | Mod: CPTII,S$GLB,, | Performed by: INTERNAL MEDICINE

## 2023-05-01 RX ORDER — PREDNISONE 50 MG/1
TABLET ORAL
COMMUNITY
Start: 2023-01-21 | End: 2023-05-01

## 2023-05-01 NOTE — PROGRESS NOTES
CHIEF COMPLAINT:   Chief Complaint   Patient presents with    Follow-up     Hospital f/u        274}  HISTORY OF PRESENT ILLNESS:  Yanni Kaiser is a 52 y.o. female who presents to the clinic today for Follow-up (Hospital f/u)  .     The patient presents to clinic today for follow-up of her recent emergency room visit for shortness of breath.  She states she was seeing her allergist in the office recently.  She complained of shortness of Breath and they were advised to go to the emergency room immediately.  She has known asthma/COPD.  She was recently treated prior to that by her PCP with steroids.  She states she was given 1 dose of IV Lasix in the emergency room which helped.  She has since been using her Symbicort daily as well as her nebulizer machine once a day and albuterol twice a day.  She was also placed on a long steroid taper that she is still completing.  She states overall she feels better today.  She does have known obstructive sleep apnea for which she is not using a CPAP machine as she states she was not able to tolerate it.      PAST MEDICAL HISTORY:  Past Medical History:   Diagnosis Date    Allergy     Anemia     Anxiety     Asthma     Back pain     Chronic bronchitis     Chronic obstructive pulmonary disease, unspecified COPD type 4/1/2022    Cigarette smoker     DDD (degenerative disc disease), lumbar 6/9/2020    Depression     Diabetes mellitus with peripheral circulatory disorder 4/1/2022    Diabetes with neurologic complications     DUB (dysfunctional uterine bleeding) 10/16/2018    GERD (gastroesophageal reflux disease)     High cholesterol     Hyperprolactinemia     Hypertension     Influenza A 01/16/2020    Neuromuscular disorder     Obese body habitus     Obesity     Pneumonia due to COVID-19 virus     Pseudotumor cerebri     Renal manifestation of secondary diabetes mellitus     Respiratory failure     S/P D&C (status post dilation and curettage) 10/16/2018    Seizures      Simple endometrial hyperplasia 2018    Sleep apnea     Smoker     Tobacco dependence     Urinary incontinence        PAST SURGICAL HISTORY:  Past Surgical History:   Procedure Laterality Date    ANTROSTOMY OF MAXILLARY SINUS AT INFERIOR NASAL MEATUS  10/22/2021    Procedure: MAXILLARY ANTROSTOMY, AT INFERIOR NASAL MEATUS;  Surgeon: John Prado III, MD;  Location: [facility] OR;  Service: ENT;;    BREAST CYST ASPIRATION       SECTION      X 1    CHOLECYSTECTOMY      COLONOSCOPY      COLONOSCOPY N/A 2019    Procedure: COLONOSCOPY;  Surgeon: Jagruti Sweeney MD;  Location: [facility] (2ND FLR);  Service: Endoscopy;  Laterality: N/A;  BMI is 63/gastroparesis/3 days full liquid diet 1 day clear liquid see telephone encounter by [facility]    EPIDURAL STEROID INJECTION N/A 2020    Procedure: INJECTION, STEROID, EPIDURAL, L5-S1 IL;  Surgeon: Lawrence Marin MD;  Location: [facility] PAIN MGT;  Service: Pain Management;  Laterality: N/A;    ESOPHAGEAL MANOMETRY WITH MEASUREMENT OF IMPEDANCE N/A 2019    Procedure: MANOMETRY-ESOPHAGEAL-WITH IMPEDANCE;  Surgeon: Jagruti Sweeney MD;  Location: [facility] ([address] [place] [address]);  Service: Endoscopy;  Laterality: N/A;  Hold Narcotics x 1 days   Hold TCA x 1 days  Propofol only. No fentanyl or benzodiazepine during sedation. If additional sedation needed, discuss with Dr. Sweeney.  10/29 - pt confirmed appt    ESOPHAGOGASTRODUODENOSCOPY N/A 2019    Procedure: EGD (ESOPHAGOGASTRODUODENOSCOPY);  Surgeon: Jagruti Sweeney MD;  Location: [facility] (2ND FLR);  Service: Endoscopy;  Laterality: N/A;  BMI is 63/gastroparesis/3 days full liquid diet 1 day clear liquid see telephone encounter by [facility]    ESOPHAGOGASTRODUODENOSCOPY N/A 2019    Procedure: ESOPHAGOGASTRODUODENOSCOPY (EGD);  Surgeon: Jagruti Sweeney MD;  Location: [facility] (2ND FLR);  Service: Endoscopy;  Laterality: N/A;  EGD with EndoFlip /+/- Botox  2nd floor for gastroparesis/BMI 63  **367lbs**  Bariatric Stretcher needed  Manometry probe to be placed endoscopically during EGD procedure  Due to change in protocol, Full liquid diet for 3 days/ 1 day of clear liquids  Hi    ESOPHAGOGASTRODUODENOSCOPY N/A 12/14/2020    Procedure: ESOPHAGOGASTRODUODENOSCOPY (EGD) with BRAVO;  Surgeon: Jagruti Sweeney MD;  Location: [facility] ENDO (2ND FLR);  Service: Endoscopy;  Laterality: N/A;  with Dilation  2nd floor due to BMI 56.20 (327 lbs) and gastroparesis  3 days full liquid diet and 1 day clears    ESOPHAGOGASTRODUODENOSCOPY N/A 04/15/2021    Procedure: ESOPHAGOGASTRODUODENOSCOPY (EGD);  Surgeon: Jagruti Sweeney MD;  Location: [facility] ENDO (2ND FLR);  Service: Endoscopy;  Laterality: N/A;  Endoflip  2nd floor-gastroparesis, BMI 57.18, bariatric stretcher  full liquid diet x3 days, clear liquid diet x1 day prior to procedure  propofol only  covid test 4/12 primary care, instructions emailed-[facility]    FOOT FRACTURE SURGERY Left     FUNCTIONAL ENDOSCOPIC SINUS SURGERY (FESS) USING COMPUTER-ASSISTED NAVIGATION Bilateral 10/22/2021    Procedure: FESS, USING COMPUTER-ASSISTED NAVIGATION;  Surgeon: John Prado III, MD;  Location: [facility] OR;  Service: ENT;  Laterality: Bilateral;  FOLLOW DR PRADO ANESTHESIA PROTOCAL / pt will stay 23 hour post surgery for SHARATH observation    HYSTEROSCOPY WITH DILATION AND CURETTAGE OF UTERUS N/A 10/16/2018    KJB    INJECTION OF ANESTHETIC AGENT AROUND NERVE Bilateral 10/23/2020    Procedure: BLOCK, NERVE  bilateral L3,4,5 MBB;  Surgeon: Lawrence Marin MD;  Location: [facility] PAIN MGT;  Service: Pain Management;  Laterality: Bilateral;  bilateral L3,4,5 MBB   consent needed    INJECTION OF ANESTHETIC AGENT AROUND NERVE Bilateral 02/18/2022    Procedure: BLOCK, NERVE, L3-L4-L5 MEDIAL BRANCH;  Surgeon: Lawrence Marin MD;  Location: [facility] PAIN MGT;  Service: Pain Management;  Laterality: Bilateral;    PLANTAR FASCIOTOMY Left 11/18/2019    Procedure: FASCIOTOMY, PLANTAR;  Surgeon: Valentino  "KENROY Orourke DPM;  [address];  Service: Podiatry;  Laterality: Left;    RETINAL LASER PROCEDURE Left     SINUS SURGERY      SPHENOIDECTOMY Bilateral 10/22/2021    Procedure: SPHENOIDECTOMY;  Surgeon: John Prado III, MD;  Location: [facility] OR;  Service: ENT;  Laterality: Bilateral;    TRANSFORAMINAL EPIDURAL INJECTION OF STEROID Bilateral 06/24/2020    Procedure: INJECTION, STEROID, EPIDURAL, TRANSFORAMINAL APPROACH;  Surgeon: Lawrence Marin MD;  Location: [facility] PAIN MGT;  Service: Pain Management;  Laterality: Bilateral;    TRANSFORAMINAL EPIDURAL INJECTION OF STEROID Bilateral 01/28/2022    Procedure: INJECTION, STEROID, EPIDURAL, TRANSFORAMINAL APPROACH  Bilateral TFESI L4/L5      NEEDS CONSENT;  Surgeon: Lawrence Marin MD;  Location: [facility] PAIN MGT;  Service: Pain Management;  Laterality: Bilateral;    TRANSFORAMINAL EPIDURAL INJECTION OF STEROID Bilateral 12/23/2022    Procedure: INJECTION, STEROID, EPIDURAL, TRANSFORAMINAL APPROACH BILATERAL L4/L5 CONTRAST  NEEDS CONSENT;  Surgeon: Lawrence Marin MD;  Location: [facility] PAIN MGT;  Service: Pain Management;  Laterality: Bilateral;    UPPER GASTROINTESTINAL ENDOSCOPY         SOCIAL HISTORY:  Social History     Socioeconomic History    Marital status:     Number of children: 4   Occupational History    Occupation: disable   Tobacco Use    Smoking status: Every Day     Packs/day: 0.25     Years: 27.00     Pack years: 6.75     Types: Cigarettes     Start date: 1/1/1992     Passive exposure: Current    Smokeless tobacco: Never    Tobacco comments:     "1/2 pack per every 2 days or so"           10/21/22 patient says its less than half a pack now. Has been cutting back.   Substance and Sexual Activity    Alcohol use: Yes     Alcohol/week: 1.0 standard drink     Types: 1 Shots of liquor per week     Comment: occasionally    Drug use: No    Sexual activity: Yes     Partners: Male     Birth control/protection: None     Comment:      Social " Determinants of Health     Financial Resource Strain: High Risk    Difficulty of Paying Living Expenses: Hard   Food Insecurity: Food Insecurity Present    Worried About Running Out of Food in the Last Year: Sometimes true    Ran Out of Food in the Last Year: Sometimes true   Transportation Needs: Unmet Transportation Needs    Lack of Transportation (Medical): Yes    Lack of Transportation (Non-Medical): Yes   Physical Activity: Inactive    Days of Exercise per Week: 0 days    Minutes of Exercise per Session: 0 min   Stress: Stress Concern Present    Feeling of Stress : To some extent   Social Connections: Unknown    Frequency of Communication with Friends and Family: Twice a week    Frequency of Social Gatherings with Friends and Family: Twice a week    Active Member of Clubs or Organizations: Yes    Attends Club or Organization Meetings: More than 4 times per year    Marital Status:    Housing Stability: High Risk    Unable to Pay for Housing in the Last Year: Yes    Number of Places Lived in the Last Year: 1    Unstable Housing in the Last Year: No       FAMILY HISTORY:  Family History   Problem Relation Age of Onset    Hypertension Mother     Diabetes Mother     Colon cancer Mother 50    Colon polyps Mother     Cataracts Mother     Heart disease Father     COPD Father     Heart attack Father     Hypertension Father     Ulcers Father     Cataracts Father     Glaucoma Father     No Known Problems Brother     Diabetes Daughter         prediabetes    Diabetes Daughter         prediabetic    Hypertension Daughter     Obesity Daughter     No Known Problems Daughter     No Known Problems Son     No Known Problems Maternal Aunt     No Known Problems Maternal Uncle     No Known Problems Paternal Aunt     No Known Problems Paternal Uncle     Cancer Maternal Grandmother     Breast cancer Maternal Grandmother     Colon cancer Maternal Grandmother     Colon polyps Maternal Grandmother     No Known Problems Maternal  Grandfather     No Known Problems Paternal Grandmother     No Known Problems Paternal Grandfather     Celiac disease Neg Hx     Cirrhosis Neg Hx     Crohn's disease Neg Hx     Cystic fibrosis Neg Hx     Esophageal cancer Neg Hx     Hemochromatosis Neg Hx     Inflammatory bowel disease Neg Hx     Irritable bowel syndrome Neg Hx     Liver cancer Neg Hx     Liver disease Neg Hx     Rectal cancer Neg Hx     Stomach cancer Neg Hx     Ulcerative colitis Neg Hx     Jonnie's disease Neg Hx     Tuberculosis Neg Hx     Lymphoma Neg Hx     Scleroderma Neg Hx     Rheum arthritis Neg Hx     Melanoma Neg Hx     Multiple sclerosis Neg Hx     Psoriasis Neg Hx     Lupus Neg Hx     Skin cancer Neg Hx     Amblyopia Neg Hx     Blindness Neg Hx     Macular degeneration Neg Hx     Retinal detachment Neg Hx     Strabismus Neg Hx     Stroke Neg Hx     Thyroid disease Neg Hx     Allergic rhinitis Neg Hx     Allergies Neg Hx     Angioedema Neg Hx     Asthma Neg Hx     Eczema Neg Hx     Immunodeficiency Neg Hx     Rhinitis Neg Hx     Urticaria Neg Hx     Atopy Neg Hx        ALLERGIES AND MEDICATIONS: updated and reviewed.  Review of patient's allergies indicates:   Allergen Reactions    Gabapentin Other (See Comments)     Makes her twitch     Medication List with Changes/Refills   Current Medications    ALBUTEROL (PROVENTIL/VENTOLIN HFA) 90 MCG/ACTUATION INHALER    Inhale 2 puffs into the lungs every 6 (six) hours as needed for Wheezing or Shortness of Breath. Rescue    ALBUTEROL-IPRATROPIUM (DUO-NEB) 2.5 MG-0.5 MG/3 ML NEBULIZER SOLUTION    Take 3 mLs by nebulization every 6 (six) hours as needed for Wheezing. Rescue    AMITRIPTYLINE (ELAVIL) 25 MG TABLET    Take 1 tablet (25 mg total) by mouth nightly as needed for Insomnia.    AMMONIUM LACTATE 12 % CREA    Apply twice daily to affected parts both feet as needed.    AZELASTINE (ASTELIN) 137 MCG (0.1 %) NASAL SPRAY    2 sprays (274 mcg total) by Nasal route 2 (two) times daily.    BLOOD  SUGAR DIAGNOSTIC (BLOOD GLUCOSE TEST) STRP    1 strip by Misc.(Non-Drug; Combo Route) route 2 (two) times a day. Insurance covers    BLOOD-GLUCOSE METER KIT    Insurance covers    BUDESONIDE-FORMOTEROL 160-4.5 MCG (SYMBICORT) 160-4.5 MCG/ACTUATION HFAA    INHALE 2 PUFFS INTO THE LUNGS EVERY 12 HOURS    CETIRIZINE (ZYRTEC) 10 MG TABLET    Take 1 tablet (10 mg total) by mouth daily as needed for Allergies (itching).    CLOBETASOL (TEMOVATE) 0.05 % EXTERNAL SOLUTION    Apply topically 2 (two) times daily. Prn scalp itch or rash    CYCLOBENZAPRINE (FLEXERIL) 10 MG TABLET    10 mg 3 (three) times daily as needed.    DEXTROMETHORPHAN (DELSYM 12 HOUR) 30 MG/5 ML LIQUID    Take 10 mLs (60 mg total) by mouth every 12 (twelve) hours.    DICLOFENAC SODIUM (VOLTAREN) 1 % GEL    Apply 2 g topically 3 (three) times daily. Apply to painful joints    FLUTICASONE PROPIONATE (FLONASE) 50 MCG/ACTUATION NASAL SPRAY    2 sprays (100 mcg total) by Each Nostril route 2 (two) times a day.    GLIPIZIDE (GLUCOTROL) 10 MG TR24    Take 1 tablet (10 mg total) by mouth daily with breakfast.    HYDROCHLOROTHIAZIDE (HYDRODIURIL) 12.5 MG TAB    Use daily as needed for leg swelling, or if BP >140/90 with losartan    LEVOCETIRIZINE (XYZAL) 5 MG TABLET    Take 1 tablet (5 mg total) by mouth every evening.    LIDOCAINE 3 % CREA    Apply 1 application topically 2 (two) times a day.    LIDOCAINE HCL 2% (XYLOCAINE) 2 % JELLY    Apply topically as needed. Apply topically once nightly to affected part of foot/feet.    LINZESS 145 MCG CAP CAPSULE    Take 1 capsule (145 mcg total) by mouth once daily.    LOSARTAN (COZAAR) 100 MG TABLET    Take 1 tablet (100 mg total) by mouth once daily.    MONTELUKAST (SINGULAIR) 10 MG TABLET    Take 1 tablet (10 mg total) by mouth every evening.    NICOTINE (NICODERM CQ) 21 MG/24 HR    Place 1 patch onto the skin once daily.    NYSTATIN-TRIAMCINOLONE (MYCOLOG) OINTMENT    Apply topically 2 (two) times daily.    ONDANSETRON  (ZOFRAN) 8 MG TABLET    Take 1 tablet (8 mg total) by mouth every 8 (eight) hours as needed for Nausea.    OXYCODONE-ACETAMINOPHEN (PERCOCET) 7.5-325 MG PER TABLET    Take 1 tablet by mouth 3 (three) times daily as needed for Pain.    PANTOPRAZOLE (PROTONIX) 40 MG TABLET    Take 1 tablet (40 mg total) by mouth 2 (two) times daily.    PREDNISONE (DELTASONE) 10 MG TABLET    Take 6 tablets (60 mg total) by mouth once daily for 4 days, THEN 4 tablets (40 mg total) once daily for 4 days, THEN 2 tablets (20 mg total) once daily for 4 days, THEN 1 tablet (10 mg total) once daily for 2 days, THEN 0.5 tablets (5 mg total) once daily for 2 days.    PREGABALIN (LYRICA) 50 MG CAPSULE    Take 1 capsule (50 mg total) by mouth 3 (three) times daily.    QUETIAPINE (SEROQUEL) 25 MG TAB    Take 1 tablet (25 mg total) by mouth once daily.    SUCRALFATE (CARAFATE) 100 MG/ML SUSPENSION    Take 1 g by mouth 3 (three) times daily.    SULINDAC (CLINORIL) 200 MG TAB    Take 1 tablet (200 mg total) by mouth 2 (two) times daily.    TIRZEPATIDE (MOUNJARO) 2.5 MG/0.5 ML PNIJ    Inject 2.5 mg into the skin every 7 days. for 4 doses    TIRZEPATIDE (MOUNJARO) 5 MG/0.5 ML PNIJ    Inject 5 mg into the skin every 7 days. for 4 doses    TIRZEPATIDE (MOUNJARO) 7.5 MG/0.5 ML PNIJ    Inject 7.5 mg into the skin every 7 days. for 4 doses    TOPIRAMATE (TOPAMAX) 100 MG TABLET    Take 1.5 tablets (150 mg total) by mouth every evening.    TRIAMCINOLONE ACETONIDE 0.1% (KENALOG) 0.1 % CREAM    Apply topically 2 (two) times daily. Prn focally for rash spots on forehead and legs.Stop using steroid topical when skin is smooth and non itchy.  Do not treat dark or red coloring.    VENLAFAXINE (EFFEXOR-XR) 75 MG 24 HR CAPSULE    Take 1 capsule (75 mg total) by mouth once daily.   Discontinued Medications    PREDNISONE (DELTASONE) 20 MG TABLET    Take 1 tablet (20 mg total) by mouth once daily.    PREDNISONE (DELTASONE) 50 MG TAB             CARE TEAM:  Patient Care  "Team:  Carlyle Gordillo MD as PCP - General (Family Medicine)  Deepa Salcedo MD as Consulting Physician (Bariatrics)  Elisha Huber CTTS as Day Respiratory Care Practitioner (CTTS)  Rosa Bosch OD as Consulting Physician (Optometry)  Jagruti Sweeney MD as Consulting Physician (Gastroenterology)  KAMILA Crawford as Nurse Practitioner (Pain Medicine)  Glenn Black Jr., MD as Obstetrician (Obstetrics)  Satya Marte MD as Consulting Physician (Neurology)  Mona Barrow LPN as Care Coordinator  Satya Lopez MD as Consulting Physician (Gastroenterology)         REVIEW OF SYSTEMS:  Review of Systems   Constitutional:  Positive for fatigue.   Respiratory:  Positive for cough and shortness of breath. Negative for wheezing.        PHYSICAL EXAM: 274}  Vitals:    05/01/23 0840   BP: (!) 140/96   BP Location: Left arm   Patient Position: Sitting   BP Method: Large (Manual)   Pulse: 85   Temp: 98.4 °F (36.9 °C)   TempSrc: Oral   SpO2: 96%   Weight: (!) 165.1 kg (363 lb 15.7 oz)   Height: 5' 4" (1.626 m)       Body mass index is 62.48 kg/m².      General appearance - alert, well appearing, and in no distress, morbidly obese  Psychiatric - alert, oriented to person, place, and time, normal behavior, speech, dress, motor activity and thought process  Chest - clear to auscultation, no wheezes, rales or rhonchi, symmetric air entry, no tachypnea, retractions or cyanosis, no shortness of breath with speech or exertion  Heart - normal rate and regular rhythm      Labs:  No labs needed at this time      ASSESSMENT AND PLAN:  274}  1. Shortness of breath/2. Moderate persistent asthma without complication  She reports that she is feeling much better.  She will complete her steroid taper.  We discussed that she can increase her albuterol to every 4-6 hours as needed.  Continue Symbicort daily and her nebulizer machine.  She reports that she has follow-up with her primary care doctor next month for " recheck.    3. SHARATH (obstructive sleep apnea)  Long discussion with the patient regarding long-term sequelae of untreated sleep apnea.  She is adamant that she can not tolerate a CPAP machine.  I recommended she follow-up with sleep Medicine to discuss her concerns.  She states she will discuss further with her primary care doctor in June.  We discussed that weight loss will help decrease her condition.    4. Mild pulmonary hypertension  I discussed with the patient that this will likely get worse with time due to her untreated obstructive sleep apnea.  I advised her that this certainly will contribute to her shortness of breath.    5. Essential hypertension  Her blood pressure is not well controlled today.  Probably related to untreated sleep apnea and steroid use.  She has follow-up with her primary care doctor next month for recheck.    6. Lipoma of left thigh  The patient reports that she also has a painful lump in her left thigh.  She had an ultrasound on this in November of last year.  I reassured her that she has a benign lipoma which will not become cancers in the future.  We discussed the risks and benefits of removal.  We will observe for now.            No orders of the defined types were placed in this encounter.     Follow up if symptoms worsen or fail to improve. or sooner as needed.    The patient will follow-up as per routine with her primary care physician for follow up of her chronic medical condition(s) and routine health maintenance.

## 2023-05-03 ENCOUNTER — PATIENT MESSAGE (OUTPATIENT)
Dept: BARIATRICS | Facility: CLINIC | Age: 53
End: 2023-05-03
Payer: MEDICARE

## 2023-05-05 ENCOUNTER — TELEPHONE (OUTPATIENT)
Dept: PHYSICAL MEDICINE AND REHAB | Facility: CLINIC | Age: 53
End: 2023-05-05
Payer: MEDICARE

## 2023-05-05 ENCOUNTER — PATIENT MESSAGE (OUTPATIENT)
Dept: NEUROLOGY | Facility: CLINIC | Age: 53
End: 2023-05-05
Payer: MEDICARE

## 2023-05-05 ENCOUNTER — PES CALL (OUTPATIENT)
Dept: ADMINISTRATIVE | Facility: CLINIC | Age: 53
End: 2023-05-05
Payer: MEDICARE

## 2023-05-09 ENCOUNTER — PATIENT MESSAGE (OUTPATIENT)
Dept: BARIATRICS | Facility: CLINIC | Age: 53
End: 2023-05-09
Payer: MEDICARE

## 2023-05-11 ENCOUNTER — TELEPHONE (OUTPATIENT)
Dept: BARIATRICS | Facility: CLINIC | Age: 53
End: 2023-05-11
Payer: MEDICARE

## 2023-05-11 NOTE — PROGRESS NOTES
Please let her know her MRI shows mild arthritis, she should continue to follow with her primary care doctor for pain management.
[FreeTextEntry1] :  pharmacologic nuclear stress test as she does not feel that she could do the treadmill\par Check bloods

## 2023-05-12 DIAGNOSIS — M79.7 FIBROMYALGIA SYNDROME: ICD-10-CM

## 2023-05-12 DIAGNOSIS — J30.9 ALLERGIC RHINITIS, UNSPECIFIED SEASONALITY, UNSPECIFIED TRIGGER: ICD-10-CM

## 2023-05-12 DIAGNOSIS — L84 CORN OR CALLUS: ICD-10-CM

## 2023-05-12 DIAGNOSIS — J45.909 ASTHMA, UNSPECIFIED ASTHMA SEVERITY, UNSPECIFIED WHETHER COMPLICATED, UNSPECIFIED WHETHER PERSISTENT: ICD-10-CM

## 2023-05-12 DIAGNOSIS — G57.52 TARSAL TUNNEL SYNDROME OF LEFT SIDE: ICD-10-CM

## 2023-05-12 DIAGNOSIS — M54.42 CHRONIC MIDLINE LOW BACK PAIN WITH BILATERAL SCIATICA: ICD-10-CM

## 2023-05-12 DIAGNOSIS — E11.51 DIABETES MELLITUS WITH PERIPHERAL CIRCULATORY DISORDER: ICD-10-CM

## 2023-05-12 DIAGNOSIS — M54.41 CHRONIC MIDLINE LOW BACK PAIN WITH BILATERAL SCIATICA: ICD-10-CM

## 2023-05-12 DIAGNOSIS — G89.29 CHRONIC MIDLINE LOW BACK PAIN WITH BILATERAL SCIATICA: ICD-10-CM

## 2023-05-12 RX ORDER — MONTELUKAST SODIUM 10 MG/1
10 TABLET ORAL NIGHTLY
Qty: 90 TABLET | Refills: 1 | Status: SHIPPED | OUTPATIENT
Start: 2023-05-12

## 2023-05-12 RX ORDER — PREGABALIN 50 MG/1
50 CAPSULE ORAL 3 TIMES DAILY
Qty: 90 CAPSULE | Refills: 3 | Status: SHIPPED | OUTPATIENT
Start: 2023-05-15 | End: 2023-09-12

## 2023-05-12 RX ORDER — AMITRIPTYLINE HYDROCHLORIDE 25 MG/1
25 TABLET, FILM COATED ORAL NIGHTLY PRN
Qty: 90 TABLET | Refills: 1 | Status: SHIPPED | OUTPATIENT
Start: 2023-05-12

## 2023-05-12 RX ORDER — OXYCODONE AND ACETAMINOPHEN 7.5; 325 MG/1; MG/1
1 TABLET ORAL 3 TIMES DAILY PRN
Qty: 90 TABLET | Refills: 0 | Status: SHIPPED | OUTPATIENT
Start: 2023-05-15 | End: 2023-05-16 | Stop reason: SDUPTHER

## 2023-05-12 NOTE — TELEPHONE ENCOUNTER
Refill Routing Note   Medication(s) are not appropriate for processing by [facility] for the following reason(s):      No active prescription written by PCP    [facility] action(s):  Route None identified          Appointments  past 12m or future 3m with PCP    Date Provider   Last Visit   Visit date not found Carlyle Gordillo, PT   Next Visit   Visit date not found Carlyle Gordillo, PT   ED visits in past 90 days: 1        Note composed:11:42 AM 05/12/2023

## 2023-05-12 NOTE — TELEPHONE ENCOUNTER
Last ordered:   Amitriptyline 02/13/23  Percocet 04/13/23  Lyrica 01/13/23    Last seen:01/19/23

## 2023-05-16 DIAGNOSIS — G89.29 CHRONIC MIDLINE LOW BACK PAIN WITH BILATERAL SCIATICA: ICD-10-CM

## 2023-05-16 DIAGNOSIS — M79.7 FIBROMYALGIA SYNDROME: ICD-10-CM

## 2023-05-16 DIAGNOSIS — M54.41 CHRONIC MIDLINE LOW BACK PAIN WITH BILATERAL SCIATICA: ICD-10-CM

## 2023-05-16 DIAGNOSIS — M54.42 CHRONIC MIDLINE LOW BACK PAIN WITH BILATERAL SCIATICA: ICD-10-CM

## 2023-05-16 RX ORDER — OXYCODONE AND ACETAMINOPHEN 7.5; 325 MG/1; MG/1
1 TABLET ORAL 3 TIMES DAILY PRN
Qty: 90 TABLET | Refills: 0 | Status: SHIPPED | OUTPATIENT
Start: 2023-05-16

## 2023-05-16 NOTE — TELEPHONE ENCOUNTER
----- Message from Ciara Raman sent at 5/16/2023  9:19 AM CDT -----  Pt calling in regards to oxyCODONE-acetaminophen (PERCOCET) 7.5-325 mg per tablet     Wants it to go to the [facility] on the [facility] Rx       Confirmed patient's contact info below:  Contact Name: Yanni Kaiser  Phone Number: [phone]

## 2023-05-18 ENCOUNTER — CLINICAL SUPPORT (OUTPATIENT)
Dept: ENDOCRINOLOGY | Facility: CLINIC | Age: 53
End: 2023-05-18
Payer: MEDICARE

## 2023-05-18 ENCOUNTER — HOSPITAL ENCOUNTER (OUTPATIENT)
Dept: CARDIOLOGY | Facility: HOSPITAL | Age: 53
Discharge: HOME | End: 2023-05-18
Attending: PODIATRIST
Payer: MEDICARE

## 2023-05-18 DIAGNOSIS — R60.0 LEG EDEMA: ICD-10-CM

## 2023-05-18 DIAGNOSIS — Z79.4 TYPE 2 DIABETES MELLITUS WITH STAGE 3 CHRONIC KIDNEY DISEASE, WITH LONG-TERM CURRENT USE OF INSULIN, UNSPECIFIED WHETHER STAGE 3A OR 3B CKD: Primary | ICD-10-CM

## 2023-05-18 DIAGNOSIS — E11.22 TYPE 2 DIABETES MELLITUS WITH STAGE 3 CHRONIC KIDNEY DISEASE, WITH LONG-TERM CURRENT USE OF INSULIN, UNSPECIFIED WHETHER STAGE 3A OR 3B CKD: Primary | ICD-10-CM

## 2023-05-18 DIAGNOSIS — E11.51 DIABETES MELLITUS WITH PERIPHERAL CIRCULATORY DISORDER: ICD-10-CM

## 2023-05-18 DIAGNOSIS — N18.30 TYPE 2 DIABETES MELLITUS WITH STAGE 3 CHRONIC KIDNEY DISEASE, WITH LONG-TERM CURRENT USE OF INSULIN, UNSPECIFIED WHETHER STAGE 3A OR 3B CKD: Primary | ICD-10-CM

## 2023-05-18 DIAGNOSIS — G89.4 CHRONIC PAIN SYNDROME: ICD-10-CM

## 2023-05-18 DIAGNOSIS — E66.01 CLASS 3 SEVERE OBESITY DUE TO EXCESS CALORIES WITH SERIOUS COMORBIDITY AND BODY MASS INDEX (BMI) OF 60.0 TO 69.9 IN ADULT: ICD-10-CM

## 2023-05-18 DIAGNOSIS — E11.65 TYPE 2 DIABETES MELLITUS WITH HYPERGLYCEMIA, WITHOUT LONG-TERM CURRENT USE OF INSULIN: ICD-10-CM

## 2023-05-18 LAB
LEFT GREAT SAPHENOUS DISTAL THIGH DIA: 0.73 CM
LEFT GREAT SAPHENOUS JUNCTION DIA: 1.52 CM
LEFT GREAT SAPHENOUS KNEE DIA: 0.61 CM
LEFT GREAT SAPHENOUS MIDDLE THIGH DIA: 0.77 CM
LEFT GREAT SAPHENOUS PROXIMAL CALF DIA: 0.37 CM
LEFT SMALL SAPHENOUS KNEE DIA: 0.48 CM
LEFT SMALL SAPHENOUS SPJ DIA: 0.22 CM
RIGHT GREAT SAPHENOUS DISTAL THIGH DIA: 0.72 CM
RIGHT GREAT SAPHENOUS JUNCTION DIA: 1.27 CM
RIGHT GREAT SAPHENOUS KNEE DIA: 0.47 CM
RIGHT GREAT SAPHENOUS MIDDLE THIGH DIA: 0.59 CM
RIGHT GREAT SAPHENOUS PROXIMAL CALF DIA: 0.39 CM
RIGHT SMALL SAPHENOUS KNEE DIA: 0.47 CM
RIGHT SMALL SAPHENOUS SPJ DIA: 0.21 CM

## 2023-05-18 PROCEDURE — 93970 EXTREMITY STUDY: CPT | Mod: TC

## 2023-05-18 PROCEDURE — 93970 CV US LOWER VENOUS INSUFFICIENCY BILATERAL (CUPID ONLY): ICD-10-PCS | Mod: 26,,, | Performed by: INTERNAL MEDICINE

## 2023-05-18 PROCEDURE — 99499 NO LOS: ICD-10-PCS | Mod: ,,, | Performed by: HOSPITALIST

## 2023-05-18 PROCEDURE — 99499 UNLISTED E&M SERVICE: CPT | Mod: ,,, | Performed by: HOSPITALIST

## 2023-05-18 PROCEDURE — 93970 EXTREMITY STUDY: CPT | Mod: 26,,, | Performed by: INTERNAL MEDICINE

## 2023-05-18 RX ORDER — TIRZEPATIDE 5 MG/.5ML
5 INJECTION, SOLUTION SUBCUTANEOUS
Qty: 4 PEN | Refills: 0 | Status: SHIPPED | OUTPATIENT
Start: 2023-05-18 | End: 2023-06-09

## 2023-05-18 NOTE — PROGRESS NOTES
Patient is here today to participate in a Continuous Glucose Monitoring Study.  Patient will wear a Dexcom for 7 days in a blinded study.  Patient will be provided with a Dexcom sensor and transmitter and a copy of the Glucose Monitoring Patient Log to fill out during the study.  A detailed explanation of Continuous Glucose Monitoring was provided.   Instructed patient to record meals, drinks,  snacks, activity, and all diabetes medications on glucose log.  Site for insertion was selected and prepared with a sterile alcohol swab and allowed to dry. Glucose Sensor Serial Number [id number] was inserted to left upper quadrant.  Insertion of sensor done in clinic, individually, in private. Time: 15 minutes

## 2023-05-19 ENCOUNTER — PATIENT OUTREACH (OUTPATIENT)
Dept: ADMINISTRATIVE | Facility: HOSPITAL | Age: 53
End: 2023-05-19
Payer: MEDICARE

## 2023-05-19 RX ORDER — LOSARTAN POTASSIUM AND HYDROCHLOROTHIAZIDE 25; 100 MG/1; MG/1
1 TABLET ORAL
COMMUNITY
Start: 2023-03-30

## 2023-05-19 RX ORDER — BUDESONIDE 1 MG/2ML
INHALANT ORAL
COMMUNITY
Start: 2023-03-24

## 2023-05-19 NOTE — PROGRESS NOTES
[facility] 1 BP Gap Report 05.01.23 - the patient has an upcoming appointment scheduled w/ Endicrinology on 05/25/2023.

## 2023-05-25 ENCOUNTER — CLINICAL SUPPORT (OUTPATIENT)
Dept: ENDOCRINOLOGY | Facility: CLINIC | Age: 53
End: 2023-05-25
Payer: MEDICARE

## 2023-05-25 ENCOUNTER — OFFICE VISIT (OUTPATIENT)
Dept: ENDOCRINOLOGY | Facility: CLINIC | Age: 53
End: 2023-05-25
Payer: MEDICARE

## 2023-05-25 VITALS
SYSTOLIC BLOOD PRESSURE: 173 MMHG | HEART RATE: 91 BPM | BODY MASS INDEX: 63.34 KG/M2 | TEMPERATURE: 99 F | DIASTOLIC BLOOD PRESSURE: 88 MMHG | WEIGHT: 293 LBS

## 2023-05-25 DIAGNOSIS — Z79.4 TYPE 2 DIABETES MELLITUS WITH STAGE 3 CHRONIC KIDNEY DISEASE, WITH LONG-TERM CURRENT USE OF INSULIN, UNSPECIFIED WHETHER STAGE 3A OR 3B CKD: Primary | ICD-10-CM

## 2023-05-25 DIAGNOSIS — N18.30 TYPE 2 DIABETES MELLITUS WITH STAGE 3 CHRONIC KIDNEY DISEASE, WITH LONG-TERM CURRENT USE OF INSULIN, UNSPECIFIED WHETHER STAGE 3A OR 3B CKD: Primary | ICD-10-CM

## 2023-05-25 DIAGNOSIS — E55.9 VITAMIN D DEFICIENCY: ICD-10-CM

## 2023-05-25 DIAGNOSIS — E11.22 TYPE 2 DIABETES MELLITUS WITH STAGE 3 CHRONIC KIDNEY DISEASE, WITH LONG-TERM CURRENT USE OF INSULIN, UNSPECIFIED WHETHER STAGE 3A OR 3B CKD: Primary | ICD-10-CM

## 2023-05-25 DIAGNOSIS — E11.65 TYPE 2 DIABETES MELLITUS WITH HYPERGLYCEMIA, WITHOUT LONG-TERM CURRENT USE OF INSULIN: ICD-10-CM

## 2023-05-25 DIAGNOSIS — I10 ESSENTIAL HYPERTENSION: Chronic | ICD-10-CM

## 2023-05-25 PROCEDURE — 99999 PR PBB SHADOW E&M-EST. PATIENT-LVL IV: ICD-10-PCS | Mod: PBBFAC,,, | Performed by: HOSPITALIST

## 2023-05-25 PROCEDURE — 95251 CONT GLUC MNTR ANALYSIS I&R: CPT | Mod: S$GLB,,, | Performed by: HOSPITALIST

## 2023-05-25 PROCEDURE — 3051F HG A1C>EQUAL 7.0%<8.0%: CPT | Mod: CPTII,S$GLB,, | Performed by: HOSPITALIST

## 2023-05-25 PROCEDURE — 3077F SYST BP >= 140 MM HG: CPT | Mod: CPTII,S$GLB,, | Performed by: HOSPITALIST

## 2023-05-25 PROCEDURE — 3072F LOW RISK FOR RETINOPATHY: CPT | Mod: CPTII,S$GLB,, | Performed by: HOSPITALIST

## 2023-05-25 PROCEDURE — 99214 OFFICE O/P EST MOD 30 MIN: CPT | Mod: S$GLB,,, | Performed by: HOSPITALIST

## 2023-05-25 PROCEDURE — 1159F PR MEDICATION LIST DOCUMENTED IN MEDICAL RECORD: ICD-10-PCS | Mod: CPTII,S$GLB,, | Performed by: HOSPITALIST

## 2023-05-25 PROCEDURE — 4010F PR ACE/ARB THEARPY RXD/TAKEN: ICD-10-PCS | Mod: CPTII,S$GLB,, | Performed by: HOSPITALIST

## 2023-05-25 PROCEDURE — 95250 CONT GLUC MNTR PHYS/QHP EQP: CPT | Mod: S$GLB,,, | Performed by: HOSPITALIST

## 2023-05-25 PROCEDURE — 3008F BODY MASS INDEX DOCD: CPT | Mod: CPTII,S$GLB,, | Performed by: HOSPITALIST

## 2023-05-25 PROCEDURE — 95251 PR GLUCOSE MONITOR, 72 HOUR, PHYS INTERP: ICD-10-PCS | Mod: S$GLB,,, | Performed by: HOSPITALIST

## 2023-05-25 PROCEDURE — 3072F PR LOW RISK FOR RETINOPATHY: ICD-10-PCS | Mod: CPTII,S$GLB,, | Performed by: HOSPITALIST

## 2023-05-25 PROCEDURE — 99499 NO LOS: ICD-10-PCS | Mod: S$GLB,,, | Performed by: HOSPITALIST

## 2023-05-25 PROCEDURE — 1159F MED LIST DOCD IN RCRD: CPT | Mod: CPTII,S$GLB,, | Performed by: HOSPITALIST

## 2023-05-25 PROCEDURE — 4010F ACE/ARB THERAPY RXD/TAKEN: CPT | Mod: CPTII,S$GLB,, | Performed by: HOSPITALIST

## 2023-05-25 PROCEDURE — 99999 PR PBB SHADOW E&M-EST. PATIENT-LVL IV: CPT | Mod: PBBFAC,,, | Performed by: HOSPITALIST

## 2023-05-25 PROCEDURE — 99214 PR OFFICE/OUTPT VISIT, EST, LEVL IV, 30-39 MIN: ICD-10-PCS | Mod: S$GLB,,, | Performed by: HOSPITALIST

## 2023-05-25 PROCEDURE — 3008F PR BODY MASS INDEX (BMI) DOCUMENTED: ICD-10-PCS | Mod: CPTII,S$GLB,, | Performed by: HOSPITALIST

## 2023-05-25 PROCEDURE — 95250 PR GLUCOSE MONITORING,72 HRS,SUB-Q SENSOR: ICD-10-PCS | Mod: S$GLB,,, | Performed by: HOSPITALIST

## 2023-05-25 PROCEDURE — 3077F PR MOST RECENT SYSTOLIC BLOOD PRESSURE >= 140 MM HG: ICD-10-PCS | Mod: CPTII,S$GLB,, | Performed by: HOSPITALIST

## 2023-05-25 PROCEDURE — 3051F PR MOST RECENT HEMOGLOBIN A1C LEVEL 7.0 - < 8.0%: ICD-10-PCS | Mod: CPTII,S$GLB,, | Performed by: HOSPITALIST

## 2023-05-25 PROCEDURE — 3079F PR MOST RECENT DIASTOLIC BLOOD PRESSURE 80-89 MM HG: ICD-10-PCS | Mod: CPTII,S$GLB,, | Performed by: HOSPITALIST

## 2023-05-25 PROCEDURE — 3079F DIAST BP 80-89 MM HG: CPT | Mod: CPTII,S$GLB,, | Performed by: HOSPITALIST

## 2023-05-25 PROCEDURE — 99499 UNLISTED E&M SERVICE: CPT | Mod: S$GLB,,, | Performed by: HOSPITALIST

## 2023-05-25 RX ORDER — GLIPIZIDE 10 MG/1
10 TABLET, FILM COATED, EXTENDED RELEASE ORAL 2 TIMES DAILY
Qty: 180 TABLET | Refills: 3 | Status: SHIPPED | OUTPATIENT
Start: 2023-05-25 | End: 2024-05-24

## 2023-05-25 RX ORDER — GLIPIZIDE 10 MG/1
10 TABLET, FILM COATED, EXTENDED RELEASE ORAL 2 TIMES DAILY
Qty: 180 TABLET | Refills: 3 | Status: SHIPPED | OUTPATIENT
Start: 2023-05-25 | End: 2023-05-25 | Stop reason: SDUPTHER

## 2023-05-25 NOTE — PATIENT INSTRUCTIONS
Glipizide 10mg twice  day with, 1 with breakfast and 1 with dinner/supper  Continue Mounjaro once a week injection

## 2023-05-25 NOTE — PROGRESS NOTES
Subjective:      Patient ID: Yanni Kaiser is a 52 y.o. female presented to [facility] on 5/25/2023.  Chief Complaint:  Diabetes      History of Present Illness: Yanni Kaiser is a 52 y.o. female here for  Type 2 diabetes  Other significant past medical history:     With regards to Diabetes Mellitus Type 2  - Known diabetic complications: nephropathy and peripheral neuropathy  - Diagnosed w/ DM: in nick[place] 2014  - Diabetes Education: No  - Patient previous least seen by bariatric medicine:  Dr. Salcedo, was given Ozempic 11/2019  - Patient saw primary care provider 02/2023 with complaints of hyperglycemia. She report that she was on off and on steroid due to asthma leading to hyperglycemia.  Reportedly having to go to emergency room for dehydration was recommended to stay in observation but she left AMA.   Patient has been taking Ozempic and glipizide.  - Patient reports Blood sugar this morning at home was in the 400s but she has had some readings in the last week in the mid 200s.  She notes her urinary frequency and dry mouth are still present but improving.    - patient has history of steroid  use, but now off all steroid  - patient reports multiple 2022, had Covid, PNA, Flu, respiratory failure, requiring hospitalization  - on initial evaluation 02/2023:  Glucose on blood work elevated at 296  Glucose is getting better.  Previously >400 last week.       Interval history:  Patient is here for diabetes management.  Reports hyperglycemia.  Currently Mounjaro per bariatric medicine  A1c 7.8%  Weight gain:  Previous visit 354, current weight 369 lb  Last use steroid pill 2 weeks ago  She report being in pain and emotional stress causing her sugar to be high.  Her mom is sick      CGM download and interpretation: Data was downloaded and personally reviewed by myself  CGM type: Dexcom G6 pro  Number of Day CGM: 7/10 d, percentage of time CGM is active: 70%  Average blood  glucose:  199, Glucose variability: 40, Glucose management indicator (GMI):  8.1%  Time in Range:  12% very high, 50% High, 38% Target Range, 0% low, 0% very low>> reviewed and discussed, goal TIR discussed with patient    Patient with hyperglycemia postprandial.  Patient did not bring dietary log for review.  Hyperglycemia noted with breakfast lunch and late night dinner.    Did have episode of normal glycemia when patient reportedly was fasting.  Data and Recommendation discussed with patient in clinic today        Glucose 191, 187. Report glucose 200 s  She denies eating starch, rice, pasta  Currently not taking steroid, did was on prednisone in the past  In clinic glucose check: 137    Current reported Mounjaro meds:    Mounjaro 5mg once a week   Glipizide 10mg once daily  Previous meds tried:              Jardiance              Ozempic 1mg once a week  Home glucose checks: checks 2x a day, Logs reviewed/Unavailable: oral recall:  - Hypoglycemia symptoms:  DENIES   Diet/Exercise:   - Eating *2xmeals per day   - Sleeping: Any difficulty falling asleep, staying asleep or partner complaints of snoring?   - What time do you usually go to bed? What time do you usually wake up?   - Weight trend: stable  - Diabetes Related Hospitalization: yes  - Hx of pancreatitis: No, denies  - Family history of diabetes: Yes  - Occupation: disabled    Eye exam current (within one year): no, DR: no, unknown  Reports cuts or ulcers on feet:   Denies, has podiatrist, has podiatrist   Statin: Not taking, ACE/ARB: Taking    Diabetes lab work  Lab Results   Component Value Date    HGBA1C 7.8 (H) 05/18/2023    HGBA1C 5.8 (H) 12/20/2022    HGBA1C 6.4 (H) 06/20/2022    HGBA1C 8.7 (H) 03/30/2022     Lab Results   Component Value Date    CPEPTIDE 7.73 (H) 05/18/2023    CPEPTIDE 1.9 02/24/2017    GLUTAMICACID 0.00 02/24/2017    ISLETCELLANT <5 02/24/2017      Lab Results   Component Value Date    FRUCTOSAMINE 326 (H) 05/18/2023     Lab Results    Component Value Date    MICALBCREAT 7.9 06/20/2022     Lab Results   Component Value Date    CHQLOLPC44 439 04/14/2021     Diabetes Management Status: Reviewed this office visit  Screening or Prevention Patient's value Goal Complete/Controlled?   Lipid profile : 06/20/2022 Annually Yes     Dilated retinal exam : 05/30/2022 Annually Yes     Foot exam   : 04/13/2023 Annually Yes        Reviewed past surgical, medical, family, social history and updated as appropriate.  Review of Systems: see HPI above    Objective:   BP (!) 173/88   Pulse 91   Temp 98.5 °F (36.9 °C) (Oral)   Wt (!) 167.4 kg (369 lb)   LMP 08/17/2019   BMI 63.34 kg/m²   Body mass index is 63.34 kg/m².  Vital signs reviewed    Physical Exam  Vitals and nursing note reviewed.   Constitutional:       Appearance: Normal appearance. She is well-developed. She is obese. She is not ill-appearing.   Neck:      Thyroid: No thyromegaly.   Pulmonary:      Effort: Pulmonary effort is normal. No respiratory distress.   Musculoskeletal:         General: Normal range of motion.      Cervical back: Normal range of motion.   Neurological:      General: No focal deficit present.      Mental Status: She is alert. Mental status is at baseline.   Psychiatric:         Mood and Affect: Mood normal.         Behavior: Behavior normal.     Lab Reviewed:  See results in subjective  Lab Results   Component Value Date    HGBA1C 7.8 (H) 05/18/2023     Lab Results   Component Value Date    CHOL 206 (H) 06/20/2022    HDL 58 06/20/2022    LDLCALC 126.4 06/20/2022    TRIG 108 06/20/2022    CHOLHDL 28.2 06/20/2022     Lab Results   Component Value Date     05/18/2023    K 3.7 05/18/2023     05/18/2023    CO2 26 05/18/2023     (H) 05/18/2023    BUN 12 05/18/2023    CREATININE 1.3 05/18/2023    CALCIUM 9.1 05/18/2023    PHOS 4.1 04/14/2021    PROT 6.8 04/25/2023    ALBUMIN 3.3 (L) 04/25/2023    BILITOT 0.4 04/25/2023    ALKPHOS 76 04/25/2023    AST 14 04/25/2023     ALT 19 04/25/2023    ANIONGAP 7 (L) 05/18/2023    ESTGFRAFRICA 56 (A) 11/10/2021    EGFRNONAA 48 (A) 11/10/2021    TSH 1.647 04/14/2021    SGSFXBEI22QF 18 (L) 05/18/2023     Assessment     1. Type 2 diabetes mellitus with hyperglycemia, without long-term current use of insulin  Hemoglobin A1C    Basic Metabolic Panel    Fructosamine    glipiZIDE (GLUCOTROL) 10 MG TR24    DISCONTINUED: glipiZIDE (GLUCOTROL) 10 MG TR24      2. Essential hypertension        3. Vitamin D deficiency             Plan     Type 2 diabetes mellitus with hyperglycemia, without long-term current use of insulin  - Diabetes is not at goal, given reported hyperglycemia, due to steroid use  - Goal A1C for patient is 7%  - Complicated by hyperglycemia and dietary indiscretion  - Diabetic supplies/medications were reviewed this visit to ensure continue steady supplies  - Advised patient to get routine feet care, routine eye exam, plus routine maintenance screening, reviewed   - Diabetes goal including glucose glucose and A1C goals discussed    Plan  - Professional CGM completed, showing postprandial hyperglycemia.  Patient denies dietary indiscretion  - continue management of Mounjaro 5 mg once a week injection with bariatric medicine clinic  - given hyperglycemia postprandial, I discussed dietary modification.  - increase glipizide 10 mg twice a day  - follow-up 4 months to re-evaluate  - Follow up as scheduled    Essential hypertension  - blood pressure review in clinic today  - manage by PCP    Vitamin D deficiency  - Lab work reviewed, and discussed with patient in clinic       Advised patient to follow up with PCP for routine health maintenance care.   RTC in 4 months    Gonzales Collins M.D.  Endocrinology  [facility]  5/25/2023      Disclaimer: This note has been generated in part with the use of voice-recognition software. There may be typographical errors that have been missed during proof-reading.

## 2023-05-25 NOTE — ASSESSMENT & PLAN NOTE
- Diabetes is not at goal, given reported hyperglycemia, due to steroid use  - Goal A1C for patient is 7%  - Complicated by hyperglycemia and dietary indiscretion  - Diabetic supplies/medications were reviewed this visit to ensure continue steady supplies  - Advised patient to get routine feet care, routine eye exam, plus routine maintenance screening, reviewed   - Diabetes goal including glucose glucose and A1C goals discussed    Plan  - Professional CGM completed, showing postprandial hyperglycemia.  Patient denies dietary indiscretion  - continue management of Mounjaro 5 mg once a week injection with bariatric medicine clinic  - given hyperglycemia postprandial, I discussed dietary modification.  - increase glipizide 10 mg twice a day  - follow-up 4 months to re-evaluate  - Follow up as scheduled

## 2023-05-25 NOTE — PROGRESS NOTES
Patient in clinic today to return Glucose Sensor.  Patient tolerated removal well, site intact, no bleeding or drainage.  The CGMS Sensor will be scanned and downloaded. All reports will be imported into the patient's electronic medical record.  Endocrine provider will complete data interpretation and make recommendations.

## 2023-06-02 ENCOUNTER — TELEPHONE (OUTPATIENT)
Dept: ADMINISTRATIVE | Facility: CLINIC | Age: 53
End: 2023-06-02
Payer: MEDICARE

## 2023-06-05 ENCOUNTER — PES CALL (OUTPATIENT)
Dept: ADMINISTRATIVE | Facility: CLINIC | Age: 53
End: 2023-06-05
Payer: MEDICARE

## 2023-06-07 ENCOUNTER — PATIENT MESSAGE (OUTPATIENT)
Dept: BARIATRICS | Facility: CLINIC | Age: 53
End: 2023-06-07
Payer: MEDICARE

## 2023-06-07 DIAGNOSIS — E11.9 TYPE 2 DIABETES MELLITUS WITHOUT COMPLICATION, UNSPECIFIED WHETHER LONG TERM INSULIN USE: ICD-10-CM

## 2023-06-12 ENCOUNTER — PES CALL (OUTPATIENT)
Dept: ADMINISTRATIVE | Facility: CLINIC | Age: 53
End: 2023-06-12
Payer: MEDICARE

## 2023-06-13 ENCOUNTER — PATIENT MESSAGE (OUTPATIENT)
Dept: BARIATRICS | Facility: CLINIC | Age: 53
End: 2023-06-13
Payer: MEDICARE

## 2023-06-13 LAB
CHOL/HDLC RATIO: 3.1
CHOLESTEROL, TOTAL: 212
EGFR: 54 ML/MIN/1.73M2
HBA1C MFR BLD: 7.4 % (ref 4–5.6)
HDLC SERPL-MCNC: 69 MG/DL
LDLC SERPL CALC-MCNC: 122 MG/DL
NONHDLC SERPL-MCNC: 143 MG/DL
TRIGL SERPL-MCNC: 104 MG/DL
TSH: 2.08 (ref 0.4–4.5)

## 2023-06-16 ENCOUNTER — DOCUMENTATION ONLY (OUTPATIENT)
Dept: REHABILITATION | Facility: HOSPITAL | Age: 53
End: 2023-06-16
Payer: MEDICARE

## 2023-06-16 NOTE — PROGRESS NOTES
Outpatient Physical Therapy Discharge Summary    Date: 06/16/2023      Date of PT eval: 1/10/23  Number of PT visits: 1  Date of last visit: 1/10/23  Cancellations:  2/1/23, 2/6/23, 2/8/23      Plan: Discharge from outpatient physical therapy due to not attending

## 2023-07-24 NOTE — ED NOTES
Respiratory therapist states pt refused bipap. Notified MD.   Calcipotriene Counseling:  I discussed with the patient the risks of calcipotriene including but not limited to erythema, scaling, itching, and irritation.

## 2023-08-02 ENCOUNTER — PATIENT MESSAGE (OUTPATIENT)
Dept: BARIATRICS | Facility: CLINIC | Age: 53
End: 2023-08-02
Payer: MEDICARE

## 2023-08-04 NOTE — PLAN OF CARE
[facility]  Physical Therapy Initial Evaluation    Name: Yanni Kaiser  [facility] Number: [id number]    Therapy Diagnosis:   Encounter Diagnoses   Name Primary?    Chronic midline low back pain with bilateral sciatica     Chronic bilateral low back pain with bilateral sciatica     Weakness of both lower extremities     Decreased range of motion of lumbar spine      Physician: Lawrence Marin MD    Physician Orders: PT Eval and Treat   Medical Diagnosis from Referral: Chronic midline low back pain with bilateral sciatica  Evaluation Date: 6/11/2020  Authorization Period Expiration: 7/11/2020  Plan of Care Expiration: 9/11/2020  Visit # / Visits authorized:1/ 1    Time In: 1:00 pm  Time Out: 1:45 pm  Total Billable Time: 45 minutes    Precautions: Standard    Subjective   Date of onset: over 5 years ago  History of current condition - Yanni reports:that she has pain in the lower back. Lower back pain is getting worse the past two years. She has hx of left foot surgery.     ALIREZA: Denies any trauma in the lower back  Any Bowel and Bladder movement issues: Yes, but not recently   Any falls: No  Any dizziness:  No  Any Headache:YEs   Any injection in lower back: steroid or epidural: No  Pain radiates: towards B lower extremity. (L>R)  Pain constant or intermitting: Constant     Pain:  Current 10/10, worst 10/10, best 5/10   Location: bilateral lower back with radiating pain   Description: Aching, Sharp and Shooting  Aggravating Factors: Sitting, Standing, Bending, Walking, Flexing and Lifting, Stairs   Easing Factors: None    Prior Therapy: Yes. Left foot  Social History:  lives with their family  Occupation: No work  Prior Level of Function: independent   Current Level of Function: sit for short period of time. Stand up for long period of time.     Pts goals: Decrease lower back pain    Imaging, MRI studies:      Impression       Multilevel lumbar spondylosis most prominent at  First balloon inflation max pressure = 15 marino. First balloon inflation duration = 20 seconds. L4-5 resulting in moderate spinal canal stenosis and moderate bilateral neural foraminal narrowing.  Findings have slightly progressed in comparison to prior study dated 2018.            Medical History:   Past Medical History:   Diagnosis Date    Allergy     Anemia     Asthma     Back pain     Chronic bronchitis     Cigarette smoker     DDD (degenerative disc disease), lumbar 2020    Depression     Diabetes with neurologic complications     DUB (dysfunctional uterine bleeding) 10/16/2018    GERD (gastroesophageal reflux disease)     High cholesterol     Hyperprolactinemia     Hypertension     Influenza A 2020    Obese body habitus     Pseudotumor cerebri     Renal manifestation of secondary diabetes mellitus     Respiratory failure     Seizures     Simple endometrial hyperplasia     Sleep apnea     Smoker     Tobacco dependence        Surgical History:   Yanni Kaiser  has a past surgical history that includes Cholecystectomy; Sinus surgery;  section; Breast cyst aspiration; Hysteroscopy with dilation and curettage of uterus (N/A, 10/16/2018); Colonoscopy; Upper gastrointestinal endoscopy; Esophagogastroduodenoscopy (N/A, 2019); Colonoscopy (N/A, 2019); Retinal laser procedure (Left); Esophagogastroduodenoscopy (N/A, 2019); Esophageal manometry with measurement of impedance (N/A, 2019); Plantar fasciotomy (Left, 2019); and Foot fracture surgery (Left).    Medications:   Yanni has a current medication list which includes the following prescription(s): acetazolamide, albuterol, albuterol-ipratropium, atorvastatin, blood sugar diagnostic, blood-glucose meter, budesonide-formoterol 160-4.5 mcg, desloratadine, diclofenac sodium, fluticasone propionate, gabapentin, ibuprofen, ketorolac, lactulose, lancets, lidocaine, losartan, metformin, ondansetron, oxycodone-acetaminophen, pantoprazole, pregabalin, prucalopride, quetiapine,  semaglutide, sumatriptan, tizanidine, topiramate, valacyclovir, and venlafaxine.    Allergies:   Review of patient's allergies indicates:  No Known Allergies       Objective       Observation: High BMI    Posture Alignment: slouched posture;increased kyphosis;decreased lumbar  lordosis    Sensation: intact to light touch    DTR:: intact     GAIT DEVIATIONS: Vertrice displays decreased step length;decreased base of support;decreased weight shift;antalgic gait    Lumbar Range of Motion:    %   Flexion 14 deg with pain    Extension 5 deg with pain    Left Side Bending 17 deg with pain   Right Side Bending 18 deg with pain    Left rotation   Mod loss with pain   Right Rotation   Mod loss with pain     *= pain    Lower Extremity Strength    Right LE  Left LE    Hip flexion: 4-/5 Hip flexion: 3+/5   Knee extension: 4-/5 Knee extension: 3+/5   Knee flexion: 4-/5 Knee flexion: 4-/5   Hip IR: 4-/5 Hip IR: 3+/5   Hip ER: 4-/5 Hip ER: 3+/5   Hip extension:  4-/5 Hip extension: 3+/5   Hip abduction: 4-/5 Hip abduction: 3+/5   Hip adduction: 4-/5 Hip adduction 3+/5   Ankle dorsiflexion: 4-/5 Ankle dorsiflexion: 4-/5   Ankle plantarflexion: 4-/5 Ankle plantarflexion: 4-/5     Special Tests:  -Repeated Flexion: positive  -Repeated Ext:positive  -Bridge Test: positive  -Heel walking: negative  -Toe walking: negative  -Long sitting test:negative  -Trendelenburg test :positive        Neuro Dynamic Testing:    Sciatic nerve:      SLR: positive       Slump test: positive      Palpation: increase global lower back pain    Flexibility:    Ely's test: unable to perform   Popliteal Angle: unable to perform        PT Evaluation Completed? Yes  Discussed Plan of Care with patient: Yes        CMS Impairment/Limitation/Restriction for FOTO Lumbar Spine Survey  Status Limitation G-Code CMS Severity Modifier  Intake 30% 70% Current Status CL - At least 60 percent but less than 80 percent  Predicted 48% 52% Goal Status+ CK - At least 40 percent  but less than 60 percent  D/C Status CL **only report if this is a one time visit    TREATMENT   Treatment Time In: 1:30 pm  Treatment Time Out: 1:40 pm  Total Treatment time separate from Evaluation: 45 minutes    Yanni received therapeutic exercises to develop strength, endurance, ROM, flexibility, posture and core stabilization for 10 minutes including:  TrA activation   Sciatica nerve glides   Supine marching         Home Exercises and Patient Education Provided    Education provided:   - Perform HEP 2 times per week    Written Home Exercises Provided: Patient instructed to cont prior HEP.  Exercises were reviewed and Yanni was able to demonstrate them prior to the end of the session.  Yanni demonstrated good  understanding of the education provided.     See EMR under Patient Instructions for exercises provided 6/11/2020.    Assessment   Yanni is a 49 y.o. female referred to outpatient Physical Therapy with a medical diagnosis of Chronic midline low back pain with bilateral sciatica. Pt presents that she presented with increase of lower back pain radiating towards B LE. Pt presented with positive slump test and SLR indicating B sciatica. She presented with decrease lumbar range of motion, decrease muscle strength, decrease posture, decrease flexibility. Pt demonstrated severe lower back pain. Pain was limiting factor today. Pt was willing to try tests and measurements. Pt will benefit from skilled PT services to improve functional mobility.     Pt prognosis is Fair.   Pt will benefit from skilled outpatient Physical Therapy to address the deficits stated above and in the chart below, provide pt/family education, and to maximize pt's level of independence.     Plan of care discussed with patient: Yes  Pt's spiritual, cultural and educational needs considered and patient is agreeable to the plan of care and goals as stated below:     Anticipated Barriers for therapy: Co-morbidities     Medical  Necessity is demonstrated by the following  History  Co-morbidities and personal factors that may impact the plan of care Co-morbidities:   Allergy   Anemia   Asthma   Back pain   Chronic bronchitis   Cigarette smoker   DDD (degenerative disc disease), lumbar   Depression   Diabetes with neurologic complications   DUB (dysfunctional uterine bleeding)   GERD (gastroesophageal reflux disease)   High cholesterol   Hyperprolactinemia   Hypertension   Influenza A   Obese body habitus   Pseudotumor cerebri   Renal manifestation of secondary diabetes mellitus   Respiratory failure   Seizures   Simple endometrial hyperplasia   Sleep apnea   Smoker   Tobacco dependence       Personal Factors:   no deficits     low   Examination  Body Structures and Functions, activity limitations and participation restrictions that may impact the plan of care Body Regions:   back    Body Systems:    ROM  strength  gait  transfers  transitions  motor control    Participation Restrictions:   Activities     Activity limitations:   Learning and applying knowledge  no deficits    General Tasks and Commands  no deficits    Communication  no deficits    Mobility  lifting and carrying objects  walking    Self care  no deficits    Domestic Life  shopping  cooking  doing house work (cleaning house, washing dishes, laundry)    Interactions/Relationships  no deficits    Life Areas  no deficits    Community and Social Life  community life  recreation and leisure         Complexity: low  See FOTO outcome assessment   Clinical Presentation stable and uncomplicated low   Decision Making/ Complexity Score: low     GOALS: Short Term Goals:  4 weeks  1. Report decreased in pain at worse less than  <   / =  5  /10  to increase tolerance for functional activities.On going  2. Increased B LE MMT 1/2  to increase tolerance for ADL and work activities.On going  3. Pt to tolerate HEP to improve ROM and independence with ADL's.On going  4. Pt to improve  Lumbar range  of motion by 25% to allow for improved functional mobility to allow for improvement in IADLs. On going    Long Term Goals: 8 weeks  1. Report decreased in pain at worse less than  <   / =  3  /10  to increase tolerance for functional mobility.   2. Increased B LE MMT 1 grade to increase tolerance for ADL and work activities.  3. Pt will report at 52% or less limitation on FOTO Lumbar spine survey  to demonstrate decrease in disability and improvement in back pain.  4. Pt to be Independent with HEP to improve ROM and independence with ADL's  5. Pt to demonstrate negative Bridge Test in order to show improved core strength for lumbar stabilization.   6. Pt to improve Lumbar  range of motion by 75% to allow for improved functional mobility to allow for improvement in IADLs.       Plan   Plan of care Certification: 6/11/2020 to 9/11/2020    Outpatient Physical Therapy 2 times weekly for 10 weeks to include the following interventions: Manual Therapy, Moist Heat/ Ice, Neuromuscular Re-ed, Patient Education, Therapeutic Activites and Therapeutic Exercise.     Abe Eduardo, PT

## 2023-08-23 DIAGNOSIS — E11.9 TYPE 2 DIABETES MELLITUS WITHOUT COMPLICATION: ICD-10-CM

## 2023-09-06 ENCOUNTER — PATIENT MESSAGE (OUTPATIENT)
Dept: BARIATRICS | Facility: CLINIC | Age: 53
End: 2023-09-06
Payer: MEDICARE

## 2023-09-12 ENCOUNTER — PATIENT MESSAGE (OUTPATIENT)
Dept: BARIATRICS | Facility: CLINIC | Age: 53
End: 2023-09-12
Payer: MEDICARE

## 2023-09-16 DIAGNOSIS — K21.9 GERD WITHOUT ESOPHAGITIS: ICD-10-CM

## 2023-09-17 LAB — EGFR: 61.6

## 2023-09-18 RX ORDER — PANTOPRAZOLE SODIUM 40 MG/1
40 TABLET, DELAYED RELEASE ORAL 2 TIMES DAILY
Qty: 180 TABLET | Refills: 1 | Status: SHIPPED | OUTPATIENT
Start: 2023-09-18

## 2023-10-04 ENCOUNTER — PATIENT MESSAGE (OUTPATIENT)
Dept: BARIATRICS | Facility: CLINIC | Age: 53
End: 2023-10-04
Payer: MEDICARE

## 2023-10-09 LAB — HBA1C MFR BLD: 7.4 % (ref 4–5.6)

## 2023-10-10 ENCOUNTER — PATIENT MESSAGE (OUTPATIENT)
Dept: BARIATRICS | Facility: CLINIC | Age: 53
End: 2023-10-10
Payer: MEDICARE

## 2023-11-06 ENCOUNTER — PATIENT MESSAGE (OUTPATIENT)
Dept: ADMINISTRATIVE | Facility: HOSPITAL | Age: 53
End: 2023-11-06
Payer: MEDICARE

## 2023-11-14 ENCOUNTER — PATIENT MESSAGE (OUTPATIENT)
Dept: BARIATRICS | Facility: CLINIC | Age: 53
End: 2023-11-14
Payer: MEDICARE

## 2023-12-12 ENCOUNTER — PATIENT MESSAGE (OUTPATIENT)
Dept: BARIATRICS | Facility: CLINIC | Age: 53
End: 2023-12-12
Payer: MEDICARE

## 2023-12-13 ENCOUNTER — PATIENT MESSAGE (OUTPATIENT)
Dept: BARIATRICS | Facility: CLINIC | Age: 53
End: 2023-12-13
Payer: MEDICARE

## 2023-12-18 ENCOUNTER — PATIENT MESSAGE (OUTPATIENT)
Dept: FAMILY MEDICINE | Facility: CLINIC | Age: 53
End: 2023-12-18
Payer: MEDICARE

## 2023-12-18 ENCOUNTER — PATIENT MESSAGE (OUTPATIENT)
Dept: BARIATRICS | Facility: CLINIC | Age: 53
End: 2023-12-18
Payer: MEDICARE

## 2024-01-05 ENCOUNTER — PATIENT OUTREACH (OUTPATIENT)
Dept: ADMINISTRATIVE | Facility: HOSPITAL | Age: 54
End: 2024-01-05
Payer: MEDICARE

## 2024-01-05 RX ORDER — AMITRIPTYLINE HYDROCHLORIDE 25 MG/1
25 TABLET, FILM COATED ORAL
COMMUNITY
Start: 2023-11-02

## 2024-01-05 RX ORDER — CYCLOBENZAPRINE HCL 10 MG
10 TABLET ORAL
COMMUNITY
Start: 2023-03-03

## 2024-01-05 RX ORDER — LIDOCAINE 3% TOPICAL ANESTHETIC CREAM 0.03 G/G
1 CREAM TOPICAL
COMMUNITY
Start: 2023-04-17

## 2024-01-05 RX ORDER — BUDESONIDE 0.5 MG/2ML
INHALANT ORAL
COMMUNITY
Start: 2023-08-03

## 2024-01-05 RX ORDER — AZELASTINE 1 MG/ML
2 SPRAY, METERED NASAL
COMMUNITY
Start: 2023-10-25 | End: 2024-10-24

## 2024-01-05 RX ORDER — DOXYCYCLINE 100 MG/1
CAPSULE ORAL
COMMUNITY
Start: 2023-10-03

## 2024-01-05 RX ORDER — HYDROCODONE BITARTRATE AND ACETAMINOPHEN 10; 325 MG/1; MG/1
TABLET ORAL
COMMUNITY
Start: 2023-07-26

## 2024-01-05 RX ORDER — DICLOFENAC SODIUM 10 MG/G
GEL TOPICAL
COMMUNITY

## 2024-01-05 RX ORDER — HYDROCHLOROTHIAZIDE 12.5 MG/1
TABLET ORAL
COMMUNITY
Start: 2023-03-02

## 2024-01-05 RX ORDER — AMOXICILLIN 500 MG/1
CAPSULE ORAL
COMMUNITY
Start: 2023-11-22

## 2024-01-05 RX ORDER — LEVOCETIRIZINE DIHYDROCHLORIDE 5 MG/1
5 TABLET, FILM COATED ORAL
COMMUNITY
Start: 2023-10-25

## 2024-01-05 RX ORDER — CLARITHROMYCIN 500 MG/1
TABLET, FILM COATED ORAL
COMMUNITY
Start: 2023-11-22

## 2024-01-05 RX ORDER — LIDOCAINE HYDROCHLORIDE 20 MG/ML
JELLY TOPICAL
COMMUNITY
Start: 2023-04-15

## 2024-01-05 RX ORDER — LANCETS
EACH MISCELLANEOUS
COMMUNITY
Start: 2023-03-17

## 2024-01-05 RX ORDER — LOSARTAN POTASSIUM 25 MG/1
25 TABLET ORAL
COMMUNITY

## 2024-01-05 RX ORDER — CETIRIZINE HYDROCHLORIDE 10 MG/1
10 TABLET ORAL
COMMUNITY
Start: 2023-03-29

## 2024-01-05 RX ORDER — TOPIRAMATE 100 MG/1
100 TABLET, FILM COATED ORAL
COMMUNITY
Start: 2023-11-02 | End: 2024-04-30

## 2024-01-05 RX ORDER — GLIPIZIDE 10 MG/1
10 TABLET ORAL
COMMUNITY

## 2024-01-05 RX ORDER — METRONIDAZOLE 500 MG/1
TABLET ORAL
COMMUNITY
Start: 2023-10-01

## 2024-01-05 RX ORDER — PREGABALIN 100 MG/1
100 CAPSULE ORAL EVERY 8 HOURS
COMMUNITY

## 2024-01-05 RX ORDER — OMEPRAZOLE 40 MG/1
CAPSULE, DELAYED RELEASE ORAL
COMMUNITY
Start: 2023-10-01

## 2024-01-05 RX ORDER — DEXTROMETHORPHAN POLISTIREX 30 MG/5ML
60 SUSPENSION ORAL
COMMUNITY
Start: 2023-04-17

## 2024-01-05 RX ORDER — PREGABALIN 50 MG/1
50 CAPSULE ORAL
COMMUNITY
Start: 2023-05-15

## 2024-01-05 RX ORDER — OXYCODONE AND ACETAMINOPHEN 10; 325 MG/1; MG/1
TABLET ORAL
COMMUNITY

## 2024-01-05 RX ORDER — RIMEGEPANT SULFATE 75 MG/75MG
TABLET, ORALLY DISINTEGRATING ORAL
COMMUNITY
Start: 2023-06-16

## 2024-01-05 RX ORDER — ATORVASTATIN CALCIUM 20 MG/1
20 TABLET, FILM COATED ORAL
COMMUNITY
Start: 2023-10-09 | End: 2024-04-30

## 2024-01-05 RX ORDER — VENLAFAXINE HYDROCHLORIDE 75 MG/1
CAPSULE, EXTENDED RELEASE ORAL
COMMUNITY
Start: 2023-04-13

## 2024-01-05 RX ORDER — TIRZEPATIDE 5 MG/.5ML
INJECTION, SOLUTION SUBCUTANEOUS
COMMUNITY
Start: 2023-06-08

## 2024-01-05 RX ORDER — SUCRALFATE 1 G/1
TABLET ORAL
COMMUNITY
Start: 2023-09-18

## 2024-01-05 RX ORDER — AMLODIPINE BESYLATE 5 MG/1
5 TABLET ORAL
COMMUNITY
Start: 2023-08-09

## 2024-01-05 RX ORDER — BUDESONIDE AND FORMOTEROL FUMARATE DIHYDRATE 160; 4.5 UG/1; UG/1
AEROSOL RESPIRATORY (INHALATION)
COMMUNITY
Start: 2023-01-26

## 2024-01-05 RX ORDER — QUETIAPINE FUMARATE 25 MG/1
TABLET, FILM COATED ORAL
COMMUNITY
Start: 2023-03-16

## 2024-01-05 NOTE — PROGRESS NOTES
Overdue Mammogram - patient is no longer seeing Dr. Gordillo, she has moved to [place]. She was informed that Dr. Gordillo will be removed from her patient profile. She verbalized understanding.    Overdue Diabetes Labs - results received and updated.    Overdue Diabetic Eye Exam - patient is no longer seeing Dr. Gordillo, she has moved to [place]. She was informed that Dr. Gordillo will be removed from her patient profile. She verbalized understanding.    Non-Compliant Blood Pressure - the patient has a compliant blood pressure reading of 128/64 and pulse = 84 taken on 11/02/2023 in Care Everywhere. BP not recorded.

## 2024-01-09 ENCOUNTER — PATIENT MESSAGE (OUTPATIENT)
Dept: BARIATRICS | Facility: CLINIC | Age: 54
End: 2024-01-09
Payer: MEDICARE

## 2024-02-10 ENCOUNTER — TELEPHONE (OUTPATIENT)
Dept: BARIATRICS | Facility: CLINIC | Age: 54
End: 2024-02-10
Payer: MEDICARE

## 2024-02-14 ENCOUNTER — PATIENT MESSAGE (OUTPATIENT)
Dept: BARIATRICS | Facility: CLINIC | Age: 54
End: 2024-02-14
Payer: MEDICARE

## 2024-02-20 ENCOUNTER — PATIENT MESSAGE (OUTPATIENT)
Dept: BARIATRICS | Facility: CLINIC | Age: 54
End: 2024-02-20
Payer: MEDICARE

## 2024-02-29 ENCOUNTER — PATIENT MESSAGE (OUTPATIENT)
Dept: BARIATRICS | Facility: CLINIC | Age: 54
End: 2024-02-29
Payer: MEDICARE

## 2024-03-06 ENCOUNTER — PATIENT MESSAGE (OUTPATIENT)
Dept: BARIATRICS | Facility: CLINIC | Age: 54
End: 2024-03-06
Payer: MEDICARE

## 2024-03-06 NOTE — ED PROVIDER NOTES
Encounter Date: 2/9/2023    SCRIBE #1 NOTE: I, Snow Haley, am scribing for, and in the presence of,  Deb Carson MD.     History     Chief Complaint   Patient presents with    sent by MD for abnormal labs     Pt was seen by her MD this morning for elevated blood sugar nad sent for evaluation after lab work completed.      Yanni Kaiser is a 52 y.o. female, with a PMHx of DMII, COPD, CKD3, HTN, chronic bronchitis, asthma, sleep apnea, pseudotumor, who presents to the ED with hyperglycemia. Additional history is provided by independent historian, patient and medical records (external records reviewed).     Patient was recently admitted at another facility around ~3 weeks ago for COPD and asthma exacerbation. At the time, she had received steroids in the ED, and started on Prednisone 60 mg during her hospital stay.  Subsequently discharged w/ improvement in symptoms but instructed to take Prednisone 40 mg daily for 3d. She then saw her Neurologist, Dr Marte, which she f/u's with for management of migraines (which she suffers from d/t pseudotumor), and was prescribed Medrol Dosepack 4 mg daily 5d ago on 2/3/23.     Over the last week, she has been experiencing symptoms of dry mouth, polydipsia, polyuria, severe frequency and urgency, fatigue, as well as blurry vision today. She saw her PCP, Johan PALM earlier today. She recently received a new blood sugar check machine. Her machine read HIGH this AM, then checked her fasting sugar which was 362. They rechecked her CBG in clinic which was 330. She had routine labs drawn with results showing signs of dehydration, prompting the clinic to contact her to recheck her CBG at home, revealing further elevation at 480, recommended to come to ED for evaluation. She states her Ozempic  (taken once a week previously) was not restarted by her bariatric medicine provider for unknown reasons, been off for the last 1-2 months. She has however been compliant with her Jardiance  In an effort to ensure that our patients [employer], a Team Member has reviewed your chart and identified an opportunity to provide the best care possible. An attempt was made to discuss or schedule overdue Preventive or Disease Management screening.     The Outcome was Contact was not made, left messageIf you have any questions or need help with scheduling, contact your primary care provider.. Care Gaps include Hypertension.     "for the last 1-2 months, but was taken off today after "she is more hydrated". She has also been placed back on Glipizide today by her PCP and took her dose after consuming a meal after her outpatient labs today. Per Johan PALM's note today, She did take some of her 's NovoLog and short-acting insulin at the same time last night because of the elevated sugars. She further notes mid sternal chest pain with swallowing lately, noting possible GERD flare. Has called GI to schedule an appointment but has not been evaluated yet. No other exacerbating or alleviating factors. Denies wounds, or other associated symptoms. Patient is also on albuterol, amitriptyline, cetirizine,  losartan, protonix, seroquel, lyrica, topamax, effexor medications. Patient denies EtOH, tobacco, or illicit drug use. Ceased tobacco use on 23. Patient reports a PSHx of EGD, epidural injections, Sphenoidectomy, Plantar fasciotomy, Colonoscopy,  Hysteroscopy with dilation and curettage of uterus,  section,  Cholecystectomy, Foot fracture surgery (Left), Retinal laser procedure (Left),  Sinus surgery, Upper gastrointestinal endoscopy. Patient has drug allergies to Gabapentin.    The history is provided by the patient and medical records.   Review of patient's allergies indicates:   Allergen Reactions    Gabapentin Other (See Comments)     Makes her twitch     Past Medical History:   Diagnosis Date    Allergy     Anemia     Anxiety     Asthma     Back pain     Chronic bronchitis     Chronic obstructive pulmonary disease, unspecified COPD type 2022    Cigarette smoker     DDD (degenerative disc disease), lumbar 2020    Depression     Diabetes mellitus with peripheral circulatory disorder 2022    Diabetes with neurologic complications     DUB (dysfunctional uterine bleeding) 10/16/2018    GERD (gastroesophageal reflux disease)     High cholesterol     Hyperprolactinemia     Hypertension     Influenza A 2020    " Neuromuscular disorder     Obese body habitus     Obesity     Pseudotumor cerebri     Renal manifestation of secondary diabetes mellitus     Respiratory failure     Seizures     Simple endometrial hyperplasia 2018    Sleep apnea     Smoker     Tobacco dependence     Urinary incontinence      Past Surgical History:   Procedure Laterality Date    ANTROSTOMY OF MAXILLARY SINUS AT INFERIOR NASAL MEATUS  10/22/2021    Procedure: MAXILLARY ANTROSTOMY, AT INFERIOR NASAL MEATUS;  Surgeon: John Prado III, MD;  Location: [facility] OR;  Service: ENT;;    BREAST CYST ASPIRATION       SECTION      X 1    CHOLECYSTECTOMY      COLONOSCOPY      COLONOSCOPY N/A 2019    Procedure: COLONOSCOPY;  Surgeon: Jagruti Sweeney MD;  Location: [facility] ([address] [place] [address]);  Service: Endoscopy;  Laterality: N/A;  BMI is 63/gastroparesis/3 days full liquid diet 1 day clear liquid see telephone encounter by Ciara limon    EPIDURAL STEROID INJECTION N/A 2020    Procedure: INJECTION, STEROID, EPIDURAL, L5-S1 IL;  Surgeon: Lawrence Marin MD;  Location: [facility] PAIN MGT;  Service: Pain Management;  Laterality: N/A;    ESOPHAGEAL MANOMETRY WITH MEASUREMENT OF IMPEDANCE N/A 2019    Procedure: MANOMETRY-ESOPHAGEAL-WITH IMPEDANCE;  Surgeon: Jagruti Sweeney MD;  Location: [facility] ([address] [place] [address]);  Service: Endoscopy;  Laterality: N/A;  Hold Narcotics x 1 days   Hold TCA x 1 days  Propofol only. No fentanyl or benzodiazepine during sedation. If additional sedation needed, discuss with Dr. Sweeney.  10/29 - pt confirmed appt    ESOPHAGOGASTRODUODENOSCOPY N/A 2019    Procedure: EGD (ESOPHAGOGASTRODUODENOSCOPY);  Surgeon: Jagruti Sweeney MD;  Location: [facility] ([address] [place] [address]);  Service: Endoscopy;  Laterality: N/A;  BMI is 63/gastroparesis/3 days full liquid diet 1 day clear liquid see telephone encounter by Ciara limon    ESOPHAGOGASTRODUODENOSCOPY N/A 2019    Procedure: ESOPHAGOGASTRODUODENOSCOPY (EGD);  Surgeon:  Jagruti Sweeney MD;  Location: [facility] (2ND FLR);  Service: Endoscopy;  Laterality: N/A;  EGD with EndoFlip /+/- Botox  2nd floor for gastroparesis/BMI 63 **367lbs**  Bariatric Stretcher needed  Manometry probe to be placed endoscopically during EGD procedure  Due to change in protocol, Full liquid diet for 3 days/ 1 day of clear liquids  Hi    ESOPHAGOGASTRODUODENOSCOPY N/A 12/14/2020    Procedure: ESOPHAGOGASTRODUODENOSCOPY (EGD) with BRAVO;  Surgeon: Jagruti Sweeney MD;  Location: [facility] ENDO (2ND FLR);  Service: Endoscopy;  Laterality: N/A;  with Dilation  2nd floor due to BMI 56.20 (327 lbs) and gastroparesis  3 days full liquid diet and 1 day clears    ESOPHAGOGASTRODUODENOSCOPY N/A 04/15/2021    Procedure: ESOPHAGOGASTRODUODENOSCOPY (EGD);  Surgeon: Jagruti Sweeney MD;  Location: [facility] ENDO (2ND FLR);  Service: Endoscopy;  Laterality: N/A;  Endoflip  2nd floor-gastroparesis, BMI 57.18, bariatric stretcher  full liquid diet x3 days, clear liquid diet x1 day prior to procedure  propofol only  covid test 4/12 primary care, instructions emailed-[facility]    FOOT FRACTURE SURGERY Left     FUNCTIONAL ENDOSCOPIC SINUS SURGERY (FESS) USING COMPUTER-ASSISTED NAVIGATION Bilateral 10/22/2021    Procedure: FESS, USING COMPUTER-ASSISTED NAVIGATION;  Surgeon: John Prado III, MD;  Location: [facility] OR;  Service: ENT;  Laterality: Bilateral;  FOLLOW DR PRADO ANESTHESIA PROTOCAL / pt will stay 23 hour post surgery for SHARATH observation    HYSTEROSCOPY WITH DILATION AND CURETTAGE OF UTERUS N/A 10/16/2018    KJB    INJECTION OF ANESTHETIC AGENT AROUND NERVE Bilateral 10/23/2020    Procedure: BLOCK, NERVE  bilateral L3,4,5 MBB;  Surgeon: Lawrence Marin MD;  Location: [facility] PAIN MGT;  Service: Pain Management;  Laterality: Bilateral;  bilateral L3,4,5 MBB   consent needed    INJECTION OF ANESTHETIC AGENT AROUND NERVE Bilateral 02/18/2022    Procedure: BLOCK, NERVE, L3-L4-L5 MEDIAL BRANCH;  Surgeon: Lawrence Marin MD;  Location:  [facility] [place]GT;  Service: Pain Management;  Laterality: Bilateral;    PLANTAR FASCIOTOMY Left 11/18/2019    Procedure: FASCIOTOMY, PLANTAR;  Surgeon: Valentino Orourke DPM;  Location: [address];  Service: Podiatry;  Laterality: Left;    RETINAL LASER PROCEDURE Left     SINUS SURGERY      SPHENOIDECTOMY Bilateral 10/22/2021    Procedure: SPHENOIDECTOMY;  Surgeon: John Prado III, MD;  Location: [facility] [place] [place];  Service: ENT;  Laterality: Bilateral;    TRANSFORAMINAL EPIDURAL INJECTION OF STEROID Bilateral 06/24/2020    Procedure: INJECTION, STEROID, EPIDURAL, TRANSFORAMINAL APPROACH;  Surgeon: Lawrence Marin MD;  Location: [facility] [place]IN [facility]GT;  Service: Pain Management;  Laterality: Bilateral;    TRANSFORAMINAL EPIDURAL INJECTION OF STEROID Bilateral 01/28/2022    Procedure: INJECTION, STEROID, EPIDURAL, TRANSFORAMINAL APPROACH  Bilateral TFESI L4/L5      NEEDS CONSENT;  Surgeon: Lawrence Marin MD;  Location: [facility] [place]IN [facility]GT;  Service: Pain Management;  Laterality: Bilateral;    TRANSFORAMINAL EPIDURAL INJECTION OF STEROID Bilateral 12/23/2022    Procedure: INJECTION, STEROID, EPIDURAL, TRANSFORAMINAL APPROACH BILATERAL L4/L5 CONTRAST  NEEDS CONSENT;  Surgeon: Lawrence Marin MD;  Location: [facility] [place]IN [facility]GT;  Service: Pain Management;  Laterality: Bilateral;    UPPER GASTROINTESTINAL ENDOSCOPY       Family History   Problem Relation Age of Onset    Hypertension Mother     Diabetes Mother     Colon cancer Mother 50    Colon polyps Mother     Cataracts Mother     Heart disease Father     COPD Father     Heart attack Father     Hypertension Father     Ulcers Father     Cataracts Father     Glaucoma Father     No Known Problems Brother     Diabetes Daughter         prediabetes    Diabetes Daughter         prediabetic    Hypertension Daughter     Obesity Daughter     No Known Problems Daughter     No Known Problems Son     No Known Problems Maternal Aunt     No Known Problems Maternal Uncle     No Known Problems Paternal Aunt  "    No Known Problems Paternal Uncle     Cancer Maternal Grandmother     Breast cancer Maternal Grandmother     Colon cancer Maternal Grandmother     Colon polyps Maternal Grandmother     No Known Problems Maternal Grandfather     No Known Problems Paternal Grandmother     No Known Problems Paternal Grandfather     Celiac disease Neg Hx     Cirrhosis Neg Hx     Crohn's disease Neg Hx     Cystic fibrosis Neg Hx     Esophageal cancer Neg Hx     Hemochromatosis Neg Hx     Inflammatory bowel disease Neg Hx     Irritable bowel syndrome Neg Hx     Liver cancer Neg Hx     Liver disease Neg Hx     Rectal cancer Neg Hx     Stomach cancer Neg Hx     Ulcerative colitis Neg Hx     Jonnie's disease Neg Hx     Tuberculosis Neg Hx     Lymphoma Neg Hx     Scleroderma Neg Hx     Rheum arthritis Neg Hx     Melanoma Neg Hx     Multiple sclerosis Neg Hx     Psoriasis Neg Hx     Lupus Neg Hx     Skin cancer Neg Hx     Amblyopia Neg Hx     Blindness Neg Hx     Macular degeneration Neg Hx     Retinal detachment Neg Hx     Strabismus Neg Hx     Stroke Neg Hx     Thyroid disease Neg Hx     Allergic rhinitis Neg Hx     Allergies Neg Hx     Angioedema Neg Hx     Asthma Neg Hx     Eczema Neg Hx     Immunodeficiency Neg Hx     Rhinitis Neg Hx     Urticaria Neg Hx     Atopy Neg Hx      Social History     Tobacco Use    Smoking status: Every Day     Packs/day: 0.25     Years: 27.00     Pack years: 6.75     Types: Cigarettes     Start date: 1/1/1992     Passive exposure: Current    Smokeless tobacco: Never    Tobacco comments:     "1/2 pack per every 2 days or so"           10/21/22 patient says its less than half a pack now. Has been cutting back.   Substance Use Topics    Alcohol use: Yes     Alcohol/week: 1.0 standard drink     Types: 1 Shots of liquor per week     Comment: occasionally    Drug use: No     Review of Systems   Constitutional:  Negative for chills, diaphoresis and fever.   Eyes:  Positive for visual disturbance. Negative for " photophobia.   Respiratory:  Negative for cough and shortness of breath.    Cardiovascular:  Negative for chest pain and leg swelling.   Gastrointestinal:  Negative for abdominal pain, blood in stool, constipation, diarrhea, nausea and vomiting.   Endocrine: Positive for polydipsia and polyuria.        +hyperglycemia.    Genitourinary:  Positive for frequency and urgency. Negative for dysuria, flank pain and hematuria.   Musculoskeletal:  Negative for neck pain and neck stiffness.   Skin:  Negative for rash and wound.   Neurological:  Negative for weakness, light-headedness, numbness and headaches.   Hematological:  Does not bruise/bleed easily.   Psychiatric/Behavioral:  Negative for confusion and suicidal ideas.    All other systems reviewed and are negative.    Physical Exam     Initial Vitals [02/09/23 1333]   BP Pulse Resp Temp SpO2   (!) 143/88 104 18 98.2 °F (36.8 °C) 98 %      MAP       --         Physical Exam    Nursing note and vitals reviewed.  Constitutional: She appears well-developed and well-nourished. She is not diaphoretic. No distress.   Elevated bmi. Body mass index is 59.79 kg/m².   HENT:   Head: Normocephalic and atraumatic.   Mouth/Throat: Oropharynx is clear and moist. No oropharyngeal exudate.   Dry mm   Eyes: Conjunctivae and EOM are normal. Pupils are equal, round, and reactive to light. Right eye exhibits no discharge. Left eye exhibits no discharge.   Neck: Neck supple. No JVD present.   Normal range of motion.  Cardiovascular:  Normal rate, regular rhythm, normal heart sounds and intact distal pulses.     Exam reveals no gallop and no friction rub.       No murmur heard.  Pulmonary/Chest: Breath sounds normal. No respiratory distress. She has no wheezes. She has no rhonchi. She has no rales.   No chest tenderness.    Abdominal: Abdomen is soft. Bowel sounds are normal. She exhibits no distension. There is no abdominal tenderness.   No abdominal tenderness.  There is no rebound and no  guarding.   Musculoskeletal:         General: No tenderness or edema.      Cervical back: Normal range of motion and neck supple.     Lymphadenopathy:     She has no cervical adenopathy.   Neurological: She is alert and oriented to person, place, and time. She has normal strength. No cranial nerve deficit or sensory deficit. GCS score is 15. GCS eye subscore is 4. GCS verbal subscore is 5. GCS motor subscore is 6.   Skin: Skin is warm and dry.   Psychiatric: She has a normal mood and affect. Thought content normal.       ED Course   Procedures  Labs Reviewed   CBC W/ AUTO DIFFERENTIAL - Abnormal; Notable for the following components:       Result Value    MCH 31.4 (*)     All other components within normal limits   COMPREHENSIVE METABOLIC PANEL - Abnormal; Notable for the following components:    Glucose 479 (*)     Creatinine 2.0 (*)     eGFR 30 (*)     All other components within normal limits    Narrative:     GLUCOSE   critical result(s) called and verbal readback obtained from   SIMONE CORNELIUS. by LM1 02/09/2023 15:50   URINALYSIS, REFLEX TO URINE CULTURE - Abnormal; Notable for the following components:    Color, UA Colorless (*)     Specific Gravity, UA >1.030 (*)     Glucose, UA 4+ (*)     All other components within normal limits    Narrative:     Specimen Source->Urine   URINALYSIS MICROSCOPIC - Abnormal; Notable for the following components:    RBC, UA 5 (*)     Yeast, UA Rare (*)     All other components within normal limits    Narrative:     Specimen Source->Urine   POCT GLUCOSE - Abnormal; Notable for the following components:    POCT Glucose >500 (*)     All other components within normal limits   ISTAT PROCEDURE - Abnormal; Notable for the following components:    POC PH 7.326 (*)     POC PCO2 47.4 (*)     POC PO2 33 (*)     POC SATURATED O2 59 (*)     All other components within normal limits   POCT GLUCOSE - Abnormal; Notable for the following components:    POCT Glucose 369 (*)     All other  components within normal limits   BETA - HYDROXYBUTYRATE, SERUM   B-TYPE NATRIURETIC PEPTIDE   TROPONIN I   B-TYPE NATRIURETIC PEPTIDE   TROPONIN I   POCT GLUCOSE MONITORING CONTINUOUS          Imaging Results              X-Ray Chest AP Portable (Final result)  Result time 02/09/23 14:21:03      Final result by Ignacio Orr MD (02/09/23 14:21:03)                   Impression:      No convincing evidence of acute cardiopulmonary disease.      Electronically signed by: Ignacio Orr  Date:    02/09/2023  Time:    14:21               Narrative:    EXAMINATION:  XR CHEST AP PORTABLE    CLINICAL HISTORY:  hyperglycemia;    TECHNIQUE:  Single frontal view of the chest was performed.    COMPARISON:  Chest radiograph 01/19/2023    FINDINGS:  Cardiomediastinal contour appears to be within normal limits.    Lungs appear essentially clear.    No definite pneumothorax or large volume pleural effusion.    No acute findings the visualized abdomen.    Osseous and soft tissue structures appear without definite acute abnormality.                                       Medications   amitriptyline tablet 25 mg (has no administration in time range)   montelukast tablet 10 mg (has no administration in time range)   pregabalin capsule 50 mg (has no administration in time range)   QUEtiapine tablet 25 mg (has no administration in time range)   topiramate tablet 150 mg (has no administration in time range)   venlafaxine 24 hr capsule 75 mg (has no administration in time range)   sodium chloride 0.9% flush 10 mL (has no administration in time range)   melatonin tablet 6 mg (has no administration in time range)   ondansetron injection 4 mg (has no administration in time range)   prochlorperazine injection Soln 5 mg (has no administration in time range)   polyethylene glycol packet 17 g (has no administration in time range)   acetaminophen tablet 650 mg (has no administration in time range)   simethicone chewable tablet 80 mg (has no  administration in time range)   aluminum-magnesium hydroxide-simethicone 200-200-20 mg/5 mL suspension 30 mL (has no administration in time range)   naloxone 0.4 mg/mL injection 0.02 mg (has no administration in time range)   glucose chewable tablet 16 g (has no administration in time range)   glucose chewable tablet 24 g (has no administration in time range)   glucagon (human recombinant) injection 1 mg (has no administration in time range)   dextrose 10% bolus 125 mL 125 mL (has no administration in time range)   dextrose 10% bolus 250 mL 250 mL (has no administration in time range)   enoxaparin injection 40 mg (has no administration in time range)   insulin aspart U-100 pen 1-10 Units (has no administration in time range)   insulin detemir U-100 pen 15 Units (has no administration in time range)   0.9%  NaCl infusion (has no administration in time range)   albuterol sulfate nebulizer solution 2.5 mg (has no administration in time range)     And   ipratropium 0.02 % nebulizer solution 0.5 mg (has no administration in time range)   sodium chloride 0.9% bolus 1,000 mL 1,000 mL (1,000 mLs Intravenous New Bag 2/9/23 1500)   lactated ringers bolus 1,000 mL (1,000 mLs Intravenous New Bag 2/9/23 1616)     Medical Decision Making:   History:   Old Medical Records: I decided to obtain old medical records.  Old Records Summarized: records from clinic visits, records from previous admission(s), records from another hospital and other records.  Clinical Tests:   Lab Tests: Ordered and Reviewed  Radiological Study: Ordered and Reviewed     External documents reviewed:   Component Ref Range & Units 11:00   (2/9/23) 2 wk ago   (1/21/23) 2 wk ago   (1/20/23) 3 wk ago   (1/19/23) 1 mo ago   (12/20/22) 4 mo ago   (10/8/22) 4 mo ago   (10/7/22)   Creatinine 0.5 - 1.4 mg/dL 1.7 High   1.2  1.4  1.3  1.2  1.2  1.2        MDM  51 yo female with PMHx of DM, recently treated with multiple rounds of steroids presents from PCP office for  "dehydration with elevated BG and elevated crea. Patient reports drinking plenty of water but also frequent urination. Crea elevated to 2.0 today (baseline 1.2) and patient not able to drink enough water to hydrated despite her efforts at home with elevated BG in the 400s. Was started back on glipizide today. Patient otherwise looks well, not in DKA. No signs of occult infection noted. CP more consistent with GERD and negative trop and BNP.     Given DUSTIN and patient already drinking "a ton" of water at home and not able to keep up with losses with place in observation for IVF and glycemic control. Case discussed with Eduardo who agrees to place patient in observation for further management and care. Patient updated and in agreement with plan.        Scribe Attestation:   Scribe #1: I performed the above scribed service and the documentation accurately describes the services I performed. I attest to the accuracy of the note.      ED Course as of 02/09/23 1809   Thu Feb 09, 2023   1747 EKG 12-lead  Normal sinus rhythm at 79.  No ST elevation or significant depression.  T-wave inversions in V3 and V4 and T-wave flattening diffusely.  QTC is 440.  Normal axis. [JT]      ED Course User Index  [JT] Deb Carson MD                 Clinical Impression:   Final diagnoses:  [R73.9] Hyperglycemia  [N17.9] DUSTIN (acute kidney injury)           I, Deb Carson, personally performed the services described in this documentation. All medical record entries made by the scribe were at my direction and in my presence. I have reviewed the chart and agree that the record reflects my personal performance and is accurate and complete.         ED Disposition Condition    Observation                 Deb Carson MD  02/09/23 2234    "

## 2024-04-03 ENCOUNTER — PATIENT MESSAGE (OUTPATIENT)
Dept: BARIATRICS | Facility: CLINIC | Age: 54
End: 2024-04-03
Payer: MEDICARE

## 2024-04-09 ENCOUNTER — PATIENT MESSAGE (OUTPATIENT)
Dept: BARIATRICS | Facility: CLINIC | Age: 54
End: 2024-04-09
Payer: MEDICARE

## 2024-04-15 ENCOUNTER — TELEPHONE (OUTPATIENT)
Dept: BARIATRICS | Facility: CLINIC | Age: 54
End: 2024-04-15
Payer: MEDICARE

## 2024-04-17 ENCOUNTER — TELEPHONE (OUTPATIENT)
Dept: BARIATRICS | Facility: CLINIC | Age: 54
End: 2024-04-17
Payer: MEDICARE

## 2024-06-19 ENCOUNTER — PATIENT MESSAGE (OUTPATIENT)
Dept: NEUROLOGY | Facility: CLINIC | Age: 54
End: 2024-06-19
Payer: MEDICARE

## 2024-08-19 NOTE — TELEPHONE ENCOUNTER
----- Message from Chelsey Mcnulty sent at 11/5/2018 12:48 PM CST -----  Contact: Self- [phone]  Millie- pt called to reschedule her appt with the dietician- originally for today 11/5 at 1:30pm- pt unable to stay until appt time- please contact pt at [phone]   Discharged

## 2024-08-26 NOTE — TELEPHONE ENCOUNTER
----- Message from Cyndy Tom sent at 1/10/2017  9:59 AM CST -----  Contact: SELF  Pt went to ER last night for headaches. Would like for Sheyla to give her a call discuss, [phone].     Thanks  
Returned call, LVM to please call clinic.   
Instructions (Optional): Dark brown speckled macule. R/O AN/ Speckled BCC
X Size Of Lesion In Cm (Optional): 0
Introduction Text (Please End With A Colon): The following procedure was deferred: biopsy of scalp
Size Of Lesion In Cm (Optional): 0.6
[place] To Def[place]
Detail Level: Zone

## 2025-04-14 ENCOUNTER — PATIENT OUTREACH (OUTPATIENT)
Dept: ADMINISTRATIVE | Facility: OTHER | Age: 55
End: 2025-04-14
Payer: MEDICARE

## 2025-04-14 NOTE — PROGRESS NOTES
[facility] - Outreach    Outreach to patient who does not have PCP on file.   Verified HIPAA with pt. Inquired if pt was interested in establishing care with [facility]   Patient declined. Patient stated [place] and [place] a [place]CP where she lives now.

## 2025-05-16 NOTE — TELEPHONE ENCOUNTER
Ciara,    I called patient and explained to her that the Esophageal Manometry probe was ordered to be placed endoscopically, and also informed her that the 24 hr pH impedance test was ordered in place of the BRAVO.  I explained to her that the 24 hr pH probe would be in place for 24 hours and the  patient stated she does not want to go home with any kind of tube down her nose.  Please advise.     Thank you,  Ainsley    Patient up to shower and void with Rn assistance, patient assisted back to bed,  Tolerated well.

## (undated) DEVICE — DEVICE TRUECLEAR INCISOR 2.9

## (undated) DEVICE — DRESSING LEUKOPLAST FLEX 1X3IN

## (undated) DEVICE — APPLICATOR ARISTA FLEX XL

## (undated) DEVICE — SHEATH CLN ENDOSCRB 4MM

## (undated) DEVICE — SOL 9P NACL IRR PIC IL

## (undated) DEVICE — TRACKER PATIENT NON INVASIVE

## (undated) DEVICE — TOWEL OR DISP STRL BLUE 4/PK

## (undated) DEVICE — WARMER DRAPE STERILE LF

## (undated) DEVICE — DRAPE HEAD/BAR 40 X 27 W/ADH

## (undated) DEVICE — SOL NACL 0.9% INJ 500ML BG

## (undated) DEVICE — SPONGE GAUZE 16PLY 4X4

## (undated) DEVICE — SEE MEDLINE ITEM 157110

## (undated) DEVICE — DRESSING XEROFORM FOIL PK 1X8

## (undated) DEVICE — SUT ETHILON 3/0 18IN PS-1

## (undated) DEVICE — POWDER ARISTA AH 3G

## (undated) DEVICE — DRESSING XEROFORM 1X8IN

## (undated) DEVICE — APPLICATOR Q-TIPS BULK 3 INCH

## (undated) DEVICE — SYR 10CC LUER LOCK

## (undated) DEVICE — SPONGE DERMACEA GAUZE 4X4

## (undated) DEVICE — STOCKINET TUBULAR 1 PLY 6X60IN

## (undated) DEVICE — BANDAGE ESMARK 6X12

## (undated) DEVICE — SEE MEDLINE ITEM 152522

## (undated) DEVICE — CONTAINER SPECIMEN STRL 4OZ

## (undated) DEVICE — SKINMARKER & RULER REGULAR X-F

## (undated) DEVICE — SEE MEDLINE ITEM 146298

## (undated) DEVICE — SOL IRR NACL .9% 3000ML

## (undated) DEVICE — SPONGE GELFOAM 12-7MM

## (undated) DEVICE — SUT 2-0 VICRYL / SH (J417)

## (undated) DEVICE — SEE MEDLINE ITEM 156934

## (undated) DEVICE — SYR 30CC LUER LOCK

## (undated) DEVICE — SPONGE COTTON TRAY 4X4IN

## (undated) DEVICE — SEE MEDLINE ITEM 146270

## (undated) DEVICE — SEE MEDLINE ITEM 157117

## (undated) DEVICE — SHEATH LENS CLN 4MM 0D A70940A

## (undated) DEVICE — SOL NS 1000CC

## (undated) DEVICE — BOWL STERILE LARGE 32OZ

## (undated) DEVICE — NDL HYPO 27G X 1 1/2

## (undated) DEVICE — GLOVE BIOGEL ECLIPSE SZ 7.5

## (undated) DEVICE — SOL PVP-I SCRUB 7.5% 4OZ

## (undated) DEVICE — NDL 18GA X1 1/2 REG BEVEL

## (undated) DEVICE — GAUZE SPONGE 4'X4 12 PLY

## (undated) DEVICE — DRESSING TELFA STRL 4X3 LF

## (undated) DEVICE — KIT ENDOKIT COMPLIANCE CUSTOM

## (undated) DEVICE — SEE MEDLINE ITEM 157194

## (undated) DEVICE — STOCKINET 4INX48

## (undated) DEVICE — DRAPE STERI INSTRUMENT 1018

## (undated) DEVICE — ELECTRODE NEEDLE 1IN

## (undated) DEVICE — TRAY MINOR ORTHO

## (undated) DEVICE — SEE MEDLINE ITEM 146313

## (undated) DEVICE — BANDAGE DERMACEA STRETCH 4X1IN

## (undated) DEVICE — TRACKER ENT INSTRUMENT

## (undated) DEVICE — SEE MEDLINE ITEM 157150

## (undated) DEVICE — SET INFLOW TUBE HYSTER

## (undated) DEVICE — SKIN MARKER DEVON 160

## (undated) DEVICE — SPONGE PATTY SURGICAL .5X3IN

## (undated) DEVICE — ELECTRODE REM PLYHSV RETURN 9

## (undated) DEVICE — TUBING SUC UNIV W/CONN 12FT

## (undated) DEVICE — ADHESIVE MASTISOL VIAL 48/BX

## (undated) DEVICE — GLOVE ENCORE ORTHO PWDR BRN 7

## (undated) DEVICE — APPLICATOR CHLORAPREP ORN 26ML

## (undated) DEVICE — CAUTERY BOVIE PENCIL

## (undated) DEVICE — POSITIONER IV ARMBOARD FOAM

## (undated) DEVICE — PAD CAST SPECIALIST STRL 4

## (undated) DEVICE — PAD PREP 50/CA

## (undated) DEVICE — DRAPE STERI-DRAPE 1000 17X11IN

## (undated) DEVICE — BLADE QUADCUT STRAIGHT 4.3MM

## (undated) DEVICE — Device

## (undated) DEVICE — GAUZE SPONGE 4X4 12PLY

## (undated) DEVICE — BLADE INFERIOR TURBINATE 5/PK